# Patient Record
Sex: MALE | Race: WHITE | NOT HISPANIC OR LATINO | Employment: PART TIME | URBAN - METROPOLITAN AREA
[De-identification: names, ages, dates, MRNs, and addresses within clinical notes are randomized per-mention and may not be internally consistent; named-entity substitution may affect disease eponyms.]

---

## 2017-01-06 ENCOUNTER — OFFICE VISIT (OUTPATIENT)
Dept: FAMILY MEDICINE | Facility: CLINIC | Age: 57
End: 2017-01-06
Payer: COMMERCIAL

## 2017-01-06 VITALS
WEIGHT: 250.9 LBS | HEIGHT: 71 IN | DIASTOLIC BLOOD PRESSURE: 78 MMHG | TEMPERATURE: 98 F | SYSTOLIC BLOOD PRESSURE: 138 MMHG | BODY MASS INDEX: 35.13 KG/M2 | HEART RATE: 64 BPM | RESPIRATION RATE: 14 BRPM

## 2017-01-06 DIAGNOSIS — M75.82 ROTATOR CUFF TENDONITIS, LEFT: Primary | ICD-10-CM

## 2017-01-06 DIAGNOSIS — S46.812A TRAPEZIUS MUSCLE STRAIN, LEFT, INITIAL ENCOUNTER: ICD-10-CM

## 2017-01-06 DIAGNOSIS — H60.92 OTITIS EXTERNA OF LEFT EAR, UNSPECIFIED CHRONICITY, UNSPECIFIED TYPE: ICD-10-CM

## 2017-01-06 PROCEDURE — 99214 OFFICE O/P EST MOD 30 MIN: CPT | Performed by: FAMILY MEDICINE

## 2017-01-06 RX ORDER — NEOMYCIN SULFATE, POLYMYXIN B SULFATE AND HYDROCORTISONE 10; 3.5; 1 MG/ML; MG/ML; [USP'U]/ML
4 SUSPENSION/ DROPS AURICULAR (OTIC) 4 TIMES DAILY
Qty: 4 ML | Refills: 0 | Status: SHIPPED | OUTPATIENT
Start: 2017-01-06 | End: 2017-01-11

## 2017-01-06 NOTE — PROGRESS NOTES
"  SUBJECTIVE:                                                    Crispin Rojas is a 56 year old male who presents to clinic today with multiple concerns:     1.   Joint Pain     Onset: 2-3 years     Description:   Location: left shoulder  Character: Sharp    Intensity: moderate, severe    Progression of Symptoms: worsening     Accompanying Signs & Symptoms:  Other symptoms: none   History:   Previous similar pain: not before the past 2-3 years      Precipitating factors:   Trauma or overuse: no     Alleviating factors:  Improved by: deep heating rub, tiger balm with some relief.        Therapies Tried and outcome: tiger balm, sports cream, helps a little  At times when pain is worse has a \" hard time going straight\" over my head or going behind like putting on seat belt\".  Pain worse at the end of day - works with hands at lot and also an avid golfer.   Cannot seem to get in a comfortable sleeping position at night. When can't sleep uses tiger balm which helps him sleep but doesn't make it go away.  Pain today is 1/10.     2.   Neck Pain     Onset: 9-10 months     Description:   Location: left side of neck   Radiation: none    Intensity: moderate    Progression of Symptoms:  waxing and waning    Accompanying Signs & Symptoms:  Burning, prickly sensation (paresthesias) in arm(s): no   Numbness in arm(s): no   Weakness in arm(s):  YES  Fever: no   Headache: no   Nausea and/or vomiting: no    History:   Trauma: no   Previous neck pain: no   Previous surgery or injections: no   Previous Imaging (MRI,X ray): no     Precipitating factors:   Does movement increase the pain:  No, movement can sometimes help pain     Alleviating factors:       Therapies Tried and outcome:  None    Feels its more muscular. Turning head seems to aggravate it.       3. Ear pain -  Has been on/off since this summer. States he mentioned it at his last physical and was told it could be referred pain from ear.   States when pain starts it " "usually aches for the remainder of the day. Denies any drainage.     Problem list and histories reviewed & adjusted, as indicated.  Additional history: as documented    Problem list, Medication list, Allergies, and Medical/Social/Surgical histories reviewed in [facility] and updated as appropriate.    ROS:  Constitutional, HEENT, msk, neuro systems are negative, except as otherwise noted.    OBJECTIVE:                                                    /78 mmHg  Pulse 64  Temp(Src) 98  F (36.7  C) (Oral)  Resp 14  Ht 5' 11\" (1.803 m)  Wt 250 lb 14.4 oz (113.807 kg)  BMI 35.01 kg/m2  Body mass index is 35.01 kg/(m^2).  GENERAL: healthy, alert and no distress  HENT: normal cephalic/atraumatic, right ear: normal: no effusions, no erythema, normal landmarks, left ear: red and boggy canal and  Hazy appearing TM, cerumen present, nose and mouth without ulcers or lesions, oropharynx clear and oral mucous membranes moist  RESP: lungs clear to auscultation - no rales, rhonchi or wheezes  CV: regular rate and rhythm, normal S1 S2, no S3 or S4, no murmur, click or rub, no peripheral edema and peripheral pulses strong  MS: left shoulder- + TTP trapezius muscle,     Diagnostic Test Results:  none      ASSESSMENT/PLAN:                                                        1. Rotator cuff tendonitis, left  - sxs consistent with rotator cuff tendinopathy. Recommend physical therapy at this time. Also discussed option of corticosteroid injection but will hold off on this for now.   If no improvement will refer to ortho for further evaluation.   - [facility]    2. Trapezius muscle strain, left, initial encounter    - [facility]    3. Otitis externa of left ear, unspecified chronicity, unspecified type  - will start on cortisporin   - neomycin-polymyxin-hydrocortisone (CORTISPORIN) 3.5-65997-9 otic suspension; Place 4 drops Into the left ear 4 times daily for 5 days  " Dispense: 4 mL; Refill: 0    See Patient Instructions    Gely Madera MD  [facility]

## 2017-01-06 NOTE — MR AVS SNAPSHOT
After Visit Summary   1/6/2017    Crispin Rojas    MRN: [id number]           Patient Information     Date Of Birth          1960        Visit Information        Provider Department      1/6/2017 8:00 AM Gely Madera MD [facility]        Today's Diagnoses     Rotator cuff tendonitis, left    -  1     Trapezius muscle strain, left, initial encounter         Otitis externa of left ear, unspecified chronicity, unspecified type           Care Instructions    Recommend Tylenol extra strength for pain  See handout provided  Follow up with physical therapy     External Ear Infection (Adult)    External otitis (also called  swimmer s ear ) is an infection in the ear canal. It is often caused by bacteria or fungus. It can occur a few days after water gets trapped in the ear canal (from swimming or bathing). It can also occur after cleaning too deeply in the ear canal with a cotton swab or other object. Sometimes, hair care products get into the ear canal and cause this problem.  Symptoms can include pain, fever, itching, redness, drainage, or swelling of the ear canal. Temporary hearing loss may also occur.  Home care    Do not try to clean the ear canal. This can push pus and bacteria deeper into the canal.    Use prescribed ear drops as directed. These help reduce swelling and fight the infection. If an ear wick was placed in the ear canal, apply drops right onto the end of the wick. The wick will draw the medication into the ear canal even if it is swollen closed.    A cotton ball may be loosely placed in the outer ear to absorb any drainage.    You may use acetaminophen or ibuprofen to control pain, unless another medication was prescribed. Note: If you have chronic liver or kidney disease or ever had a stomach ulcer or GI bleeding, talk to your health care provider before taking any of these medications.    Do not allow water to get into your ear when bathing.  Also, avoid swimming until the infection has cleared.  Prevention    Keep your ears dry. This helps lower the risk of infection. Dry your ears with a towel or hair dryer after getting wet. Also, use ear plugs when swimming.    Do not stick any objects in the ear to remove wax.    If you feel water trapped in your ear, use ear drops right away. You can get these drops over the counter at most drugstores.  They work by removing water from the ear canal.  Follow-up care  Follow up with your health care provider in one week, or as advised.   When to seek medical advice  Call your health care provider right away if any of these occur:    Ear pain becomes worse or doesn t improve after 3 days of treatment    Redness or swelling of the outer ear occurs or gets worse    Headache    Painful or stiff neck    Drowsiness or confusion    Fever of 100.4 F (38 C) or higher, or as directed by your health care provider    Seizure    8817-1504 [employer]. [address] [place] [address]. All rights reserved. This information is not intended as a substitute for professional medical care. Always follow your healthcare professional's instructions.              Follow-ups after your visit        Additional Services     [facility]       **This order will print in the [facility] Scheduling Office**    Physical Therapy, Hand Therapy and Chiropractic Care are available through:    *[facility]  *[facility]  *[facility]    Call one number to schedule at any of the above locations: [phone].    Your provider has referred you to: Physical Therapy at [facility] or [facility]    Indication/Reason for Referral: Shoulder Pain and neck pain   Onset of Illness: months   Therapy Orders: Evaluate and Treat  Special Programs: None  Special Request: None    Nicole Rivera      Additional Comments for the Therapist or Chiropractor:     Please be aware that  "coverage of these services is subject to the terms and limitations of your health insurance plan.  Call member services at your health plan with any benefit or coverage questions.      Please bring the following to your appointment:    *Your personal calendar for scheduling future appointments  *Comfortable clothing                  Who to contact     If you have questions or need follow up information about today's clinic visit or your schedule please contact [facility] directly at [phone].  Normal or non-critical lab and imaging results will be communicated to you by [employer], letter or phone within 4 business days after the clinic has received the results. If you do not hear from us within 7 days, please contact the clinic through [employer] or phone. If you have a critical or abnormal lab result, we will notify you by phone as soon as possible.  Submit refill requests through [employer] or call your pharmacy and they will forward the refill request to us. Please allow 3 business days for your refill to be completed.          Additional Information About Your Visit        [employer]inder[employer]cai Information     [employer] gives you secure access to your electronic health record. If you see a primary care provider, you can also send messages to your care team and make appointments. If you have questions, please call your primary care clinic.  If you do not have a primary care provider, please call [phone] and they will assist you.        Care EveryWhere ID     This is your Care EveryWhere ID. This could be used by other organizations to access your [facility] medical records  [id number]        Your Vitals Were     Pulse Temperature Respirations Height BMI (Body Mass Index)       64 98  F (36.7  C) (Oral) 14 5' 11\" (1.803 m) 35.01 kg/m2        Blood Pressure from Last 3 Encounters:   01/06/17 138/78   12/02/16 124/78   10/14/16 132/62    Weight from Last 3 Encounters:   01/06/17 250 lb 14.4 oz (113.807 kg) "   12/02/16 239 lb (108.41 kg)   10/14/16 219 lb (99.338 kg)              We Performed the Following     [facility]          Today's Medication Changes          These changes are accurate as of: 1/6/17  9:29 AM.  If you have any questions, ask your nurse or doctor.               Start taking these medicines.        Dose/Directions    neomycin-polymyxin-hydrocortisone 3.5-18123-5 otic suspension   Commonly known as:  CORTISPORIN   Used for:  Otitis externa of left ear, unspecified chronicity, unspecified type   Started by:  Gely Madera MD        Dose:  4 drop   Place 4 drops Into the left ear 4 times daily for 5 days   Quantity:  4 mL   Refills:  0            Where to get your medicines      These medications were sent to [id number]  [place], [place] - [address] AT [place] [address]  [address], [place] [address]     Phone:  [phone]    - neomycin-polymyxin-hydrocortisone 3.5-00609-9 otic suspension             Primary Care Provider Office Phone # Fax #    Aden Ethan Lopez [phone]       [address] [address]        Thank you!     Thank you for choosing [facility]  for your care. Our goal is always to provide you with excellent care. Hearing back from our patients is one way we can continue to improve our services. Please take a few minutes to complete the written survey that you may receive in the mail after your visit with us. Thank you!             Your Updated Medication List - Protect others around you: Learn how to safely use, store and throw away your medicines at www.disposemymeds.org.          This list is accurate as of: 1/6/17  9:29 AM.  Always use your most recent med list.                   Brand Name Dispense Instructions for use    amLODIPine 5 MG tablet    NORVASC    90 tablet    Take 1 tablet (5 mg) by mouth daily        "atorvastatin 40 MG tablet    LIPITOR    30 tablet    Take 1 tablet (40 mg) by mouth daily       BD insulin syringe ultrafine 30G X 1/2\" 0.3 ML   Generic drug:  insulin syringe-needle U-100     100 each    1 Syringe daily. Use one syringe  daily or as directed.       blood glucose monitoring test strip    ACCU-CHEK SMARTVIEW    1 Box    by In Vitro route 3 times daily DISPENSE PER PT INSURANCE PER PT PREFERENCE       Co-Enzyme Q10 100 MG Caps      Take 100 mg by mouth daily       insulin aspart 100 UNIT/ML injection    NovoLOG FLEXPEN    3 Month    For mealtime carbohydrate and hgm coverage 10-25 units three times daily       insulin glargine 100 UNIT/ML injection    LANTUS SOLOSTAR    3 Month    Take 75 units daily       ketoconazole 2 % shampoo    NIZORAL    120 mL    Apply to the affected area and wash off after 5 minutes.       lisinopril 10 MG tablet    PRINIVIL/ZESTRIL    90 tablet    Take 1 tablet (10 mg) by mouth daily       metFORMIN 500 MG 24 hr tablet    GLUCOPHAGE-XR    360 tablet    Take 2 tablets (1,000 mg) by mouth 2 times daily (with meals)       MULTIVITAMIN PO      Take 1 tablet by mouth daily       neomycin-polymyxin-hydrocortisone 3.5-92029-7 otic suspension    CORTISPORIN    4 mL    Place 4 drops Into the left ear 4 times daily for 5 days       OMEGA-3 FISH OIL PO      Take 2 g by mouth daily       thin lancets    NO BRAND SPECIFIED    300 each    1 each 3 times daily.       VITAMIN B 12 PO      Take 500 mcg by mouth daily       VITAMIN D3 PO      Take 1,000 Units by mouth daily         "

## 2017-01-06 NOTE — NURSING NOTE
"Chief Complaint   Patient presents with     Ear Problem     left ear pain-- off and on since this summer, pt has been seen with PCP for this     Shoulder Pain     Left shoulder, no c/o accident or injury- off and on for 2-3 years     Neck Pain     left side neck- off and on for 9-10 months     Initial /78 mmHg  Pulse 64  Temp(Src) 98  F (36.7  C) (Oral)  Resp 14  Ht 5' 11\" (1.803 m)  Wt 250 lb 14.4 oz (113.807 kg)  BMI 35.01 kg/m2 Estimated body mass index is 35.01 kg/(m^2) as calculated from the following:    Height as of this encounter: 5' 11\" (1.803 m).    Weight as of this encounter: 250 lb 14.4 oz (113.807 kg).  BP completed using cuff size large Right Arm    Health Maintenance reviewed - Yes: (pt will have foot exam at next DM visit)  Tobacco Verified: Yes  Family History Updated: Yes  MyChart Offered: Yes  Immunizations Up to Date:  Yes    Haydee Kitchen CMA      "

## 2017-01-06 NOTE — PATIENT INSTRUCTIONS
Recommend Tylenol extra strength for pain  See handout provided  Follow up with physical therapy     External Ear Infection (Adult)    External otitis (also called  swimmer s ear ) is an infection in the ear canal. It is often caused by bacteria or fungus. It can occur a few days after water gets trapped in the ear canal (from swimming or bathing). It can also occur after cleaning too deeply in the ear canal with a cotton swab or other object. Sometimes, hair care products get into the ear canal and cause this problem.  Symptoms can include pain, fever, itching, redness, drainage, or swelling of the ear canal. Temporary hearing loss may also occur.  Home care    Do not try to clean the ear canal. This can push pus and bacteria deeper into the canal.    Use prescribed ear drops as directed. These help reduce swelling and fight the infection. If an ear wick was placed in the ear canal, apply drops right onto the end of the wick. The wick will draw the medication into the ear canal even if it is swollen closed.    A cotton ball may be loosely placed in the outer ear to absorb any drainage.    You may use acetaminophen or ibuprofen to control pain, unless another medication was prescribed. Note: If you have chronic liver or kidney disease or ever had a stomach ulcer or GI bleeding, talk to your health care provider before taking any of these medications.    Do not allow water to get into your ear when bathing. Also, avoid swimming until the infection has cleared.  Prevention    Keep your ears dry. This helps lower the risk of infection. Dry your ears with a towel or hair dryer after getting wet. Also, use ear plugs when swimming.    Do not stick any objects in the ear to remove wax.    If you feel water trapped in your ear, use ear drops right away. You can get these drops over the counter at most drugstores.  They work by removing water from the ear canal.  Follow-up care  Follow up with your health care provider in  one week, or as advised.   When to seek medical advice  Call your health care provider right away if any of these occur:    Ear pain becomes worse or doesn t improve after 3 days of treatment    Redness or swelling of the outer ear occurs or gets worse    Headache    Painful or stiff neck    Drowsiness or confusion    Fever of 100.4 F (38 C) or higher, or as directed by your health care provider    Seizure    3962-3737 [employer]. [address] [place] [address], [place] [address]. All rights reserved. This information is not intended as a substitute for professional medical care. Always follow your healthcare professional's instructions.

## 2017-01-23 DIAGNOSIS — E11.49 DIABETES MELLITUS TYPE 2 WITH NEUROLOGICAL MANIFESTATIONS (H): Primary | ICD-10-CM

## 2017-01-23 NOTE — TELEPHONE ENCOUNTER
blood glucose monitoring (ACCU-CHEK SMARTVIEW) test strip      Last Written Prescription Date: 06/02/16  Last Fill Quantity: 1,  # refills: 12   Last Office Visit with [facility], [facility] or [facility] prescribing provider: 12/02/16

## 2017-01-24 NOTE — TELEPHONE ENCOUNTER
A1C      7.3   12/5/2016  A1C      6.7   8/30/2016  Recent Labs   Lab Test  12/05/16   1500  08/30/16   1419   07/31/15   1649   CHOL  131  92   < >  141   HDL  45  33*   < >  34*   LDL  69  42   < >  87   TRIG  84  85   < >  101   CHOLHDLRATIO   --    --    --   4.1    < > = values in this interval not displayed.     BP Readings from Last 2 Encounters:   01/06/17 138/78   12/02/16 124/78     Prescription approved per [facility] Refill Protocol.  June Thomas, RN, BSN

## 2017-01-25 ENCOUNTER — TELEPHONE (OUTPATIENT)
Dept: NURSING | Facility: CLINIC | Age: 57
End: 2017-01-25

## 2017-01-25 DIAGNOSIS — E11.49 DIABETES MELLITUS TYPE 2 WITH NEUROLOGICAL MANIFESTATIONS (H): Primary | ICD-10-CM

## 2017-01-25 NOTE — TELEPHONE ENCOUNTER
Pt calls, needs new glucometer and test strips sent to [facility]'s per pt insurance  Prescription approved per [facility] Refill Protocol.  June Thomas RN, BSN

## 2017-01-26 ENCOUNTER — THERAPY VISIT (OUTPATIENT)
Dept: PHYSICAL THERAPY | Facility: CLINIC | Age: 57
End: 2017-01-26
Payer: COMMERCIAL

## 2017-01-26 DIAGNOSIS — G89.29 CHRONIC LEFT SHOULDER PAIN: Primary | ICD-10-CM

## 2017-01-26 DIAGNOSIS — M25.512 CHRONIC LEFT SHOULDER PAIN: Primary | ICD-10-CM

## 2017-01-26 PROCEDURE — 97161 PT EVAL LOW COMPLEX 20 MIN: CPT | Mod: GP | Performed by: PHYSICAL THERAPIST

## 2017-01-26 PROCEDURE — 97110 THERAPEUTIC EXERCISES: CPT | Mod: GP | Performed by: PHYSICAL THERAPIST

## 2017-01-26 NOTE — PROGRESS NOTES
Subjective:    Crispin Rojas is a 56 year old male with a left shoulder condition.  Condition occurred with:  Unknown cause (L shoulder pain for about 18 months, with slowly getting worse. Pt also c/o neck pain might be swimmers ear (taking ear drops and some improvement) will assess further.).  Condition occurred: for unknown reasons.  This is a chronic condition  Jan 2017 (MD hein)    .    Patient reports pain:  Anterior, lateral and medial.    Pain is described as aching and is intermittent and reported as 10/10 and 5/10.  Associated symptoms:  Loss of motion/stiffness and loss of strength. Pain is worse during the day.  Symptoms are exacerbated by using arm overhead, lying on extremity, using arm behind back, lifting and certain positions and relieved by rest.  Since onset symptoms are gradually worsening.        General health as reported by patient is good.  Pertinent medical history includes:  Diabetes.  Medical allergies: yes.  Surgical history: see EPIC.  Medication history: See Epic.  Current occupation is Working at [employer], [employer], owns   .        Barriers include:  None as reported by the patient.    Red flags:  None as reported by the patient.                      Objective:    Standing Alignment:    Cervical/Thoracic:  Thoracic kyphosis increased  Shoulder/UE:  Rounded shoulders                                       Shoulder Evaluation:  ROM:  AROM:    Flexion:  Left:  120        Abduction:  Left: 110                         PROM:    Flexion:  Left:  110          Abduction:  Left:  100        Internal Rotation:  Left:  80      External Rotation:  Left:  25                        Strength:    Flexion: Left:5-/5   Pain:    Right: 5/5     Pain:     Abduction:  Left: 4+/5   Pain:+    Right: 5/5     Pain:    Internal Rotation:  Left:4+/5      Pain:+    Right: 5/5     Pain:  External Rotation:   Left:5-/5     Pain:   Right:5/5     Pain:                                                        General     ROS    Assessment/Plan:      Patient is a 56 year old male with cervical and left side shoulder complaints.    Patient has the following significant findings with corresponding treatment plan.                Diagnosis 1:  L shoulder pain, OA vs Adhesive Capsulitis  Pain -  hot/cold therapy, self management, education and home program  Decreased ROM/flexibility - manual therapy and therapeutic exercise  Decreased strength - therapeutic exercise and therapeutic activities  Decreased function - therapeutic activities  Impaired posture - neuro re-education    Therapy Evaluation Codes:   1) History comprised of:   Personal factors that impact the plan of care:      Time since onset of symptoms.    Comorbidity factors that impact the plan of care are:      Diabetes.     Medications impacting care: None.  2) Examination of Body Systems comprised of:   Body structures and functions that impact the plan of care:      Cervical spine and Shoulder.   Activity limitations that impact the plan of care are:      Bathing, Dressing, Lifting and Laying down.  3) Clinical presentation characteristics are:   Stable/Uncomplicated.  4) Decision-Making    Low complexity using standardized patient assessment instrument and/or measureable assessment of functional outcome.  Cumulative Therapy Evaluation is: Low complexity.    Previous and current functional limitations:  (See Goal Flow Sheet for this information)    Short term and Long term goals: (See Goal Flow Sheet for this information)     Communication ability:  Patient appears to be able to clearly communicate and understand verbal and written communication and follow directions correctly.  Treatment Explanation - The following has been discussed with the patient:   RX ordered/plan of care  Anticipated outcomes  Possible risks and side effects  This patient would benefit from PT intervention to resume normal activities.   Rehab potential is good.    Frequency:  1 X  week, once daily  Duration:  for 6-8 weeks  Discharge Plan:  Achieve all LTG.  Independent in home treatment program.  Reach maximal therapeutic benefit.    Please refer to the daily flowsheet for treatment today, total treatment time and time spent performing 1:1 timed codes.

## 2017-01-30 ENCOUNTER — THERAPY VISIT (OUTPATIENT)
Dept: PHYSICAL THERAPY | Facility: CLINIC | Age: 57
End: 2017-01-30
Payer: COMMERCIAL

## 2017-01-30 DIAGNOSIS — M54.2 CERVICALGIA: ICD-10-CM

## 2017-01-30 DIAGNOSIS — M25.512 CHRONIC LEFT SHOULDER PAIN: Primary | ICD-10-CM

## 2017-01-30 DIAGNOSIS — G89.29 CHRONIC LEFT SHOULDER PAIN: Primary | ICD-10-CM

## 2017-01-30 PROCEDURE — 97110 THERAPEUTIC EXERCISES: CPT | Mod: GP | Performed by: PHYSICAL THERAPIST

## 2017-01-30 PROCEDURE — 97140 MANUAL THERAPY 1/> REGIONS: CPT | Mod: GP | Performed by: PHYSICAL THERAPIST

## 2017-02-02 ENCOUNTER — THERAPY VISIT (OUTPATIENT)
Dept: PHYSICAL THERAPY | Facility: CLINIC | Age: 57
End: 2017-02-02
Payer: COMMERCIAL

## 2017-02-02 DIAGNOSIS — M54.2 CERVICALGIA: ICD-10-CM

## 2017-02-02 DIAGNOSIS — M25.512 CHRONIC LEFT SHOULDER PAIN: Primary | ICD-10-CM

## 2017-02-02 DIAGNOSIS — G89.29 CHRONIC LEFT SHOULDER PAIN: Primary | ICD-10-CM

## 2017-02-02 PROCEDURE — 97110 THERAPEUTIC EXERCISES: CPT | Mod: GP | Performed by: PHYSICAL THERAPIST

## 2017-02-02 PROCEDURE — 97140 MANUAL THERAPY 1/> REGIONS: CPT | Mod: GP | Performed by: PHYSICAL THERAPIST

## 2017-02-09 ENCOUNTER — THERAPY VISIT (OUTPATIENT)
Dept: PHYSICAL THERAPY | Facility: CLINIC | Age: 57
End: 2017-02-09
Payer: COMMERCIAL

## 2017-02-09 DIAGNOSIS — M25.512 CHRONIC LEFT SHOULDER PAIN: Primary | ICD-10-CM

## 2017-02-09 DIAGNOSIS — M54.2 CERVICALGIA: ICD-10-CM

## 2017-02-09 DIAGNOSIS — G89.29 CHRONIC LEFT SHOULDER PAIN: Primary | ICD-10-CM

## 2017-02-09 PROCEDURE — 97110 THERAPEUTIC EXERCISES: CPT | Mod: GP | Performed by: PHYSICAL THERAPIST

## 2017-02-09 PROCEDURE — 97112 NEUROMUSCULAR REEDUCATION: CPT | Mod: GP | Performed by: PHYSICAL THERAPIST

## 2017-02-16 ENCOUNTER — THERAPY VISIT (OUTPATIENT)
Dept: PHYSICAL THERAPY | Facility: CLINIC | Age: 57
End: 2017-02-16
Payer: COMMERCIAL

## 2017-02-16 DIAGNOSIS — M54.2 CERVICALGIA: ICD-10-CM

## 2017-02-16 DIAGNOSIS — M25.512 CHRONIC LEFT SHOULDER PAIN: ICD-10-CM

## 2017-02-16 DIAGNOSIS — G89.29 CHRONIC LEFT SHOULDER PAIN: ICD-10-CM

## 2017-02-16 PROCEDURE — 97110 THERAPEUTIC EXERCISES: CPT | Mod: GP | Performed by: PHYSICAL THERAPIST

## 2017-02-16 PROCEDURE — 97140 MANUAL THERAPY 1/> REGIONS: CPT | Mod: GP | Performed by: PHYSICAL THERAPIST

## 2017-02-23 ENCOUNTER — OFFICE VISIT (OUTPATIENT)
Dept: FAMILY MEDICINE | Facility: CLINIC | Age: 57
End: 2017-02-23
Payer: COMMERCIAL

## 2017-02-23 ENCOUNTER — TRANSFERRED RECORDS (OUTPATIENT)
Dept: HEALTH INFORMATION MANAGEMENT | Facility: CLINIC | Age: 57
End: 2017-02-23

## 2017-02-23 VITALS
DIASTOLIC BLOOD PRESSURE: 74 MMHG | SYSTOLIC BLOOD PRESSURE: 144 MMHG | TEMPERATURE: 97.9 F | HEART RATE: 56 BPM | RESPIRATION RATE: 16 BRPM | WEIGHT: 257 LBS | BODY MASS INDEX: 35.84 KG/M2

## 2017-02-23 DIAGNOSIS — H92.02 OTALGIA OF LEFT EAR: Primary | ICD-10-CM

## 2017-02-23 PROCEDURE — 99213 OFFICE O/P EST LOW 20 MIN: CPT | Performed by: FAMILY MEDICINE

## 2017-02-23 NOTE — MR AVS SNAPSHOT
After Visit Summary   2/23/2017    Crispin oRjas    MRN: [id number]           Patient Information     Date Of Birth          1960        Visit Information        Provider Department      2/23/2017 7:00 AM Gely Madera MD [facility]        Today's Diagnoses     Otalgia of left ear    -  1      Care Instructions    Follow up with ENT for further evaluation   Call back for ear drops if cannot be seen by ENT for a while         Follow-ups after your visit        Additional Services     OTOLARYNGOLOGY REFERRAL       Your provider has referred you to: [facility]: [facility] [phone]   [email or web address]    Please be aware that coverage of these services is subject to the terms and limitations of your health insurance plan.  Call member services at your health plan with any benefit or coverage questions.      Please bring the following with you to your appointment:    (1) Any X-Rays, CTs or MRIs which have been performed.  Contact the facility where they were done to arrange for  prior to your scheduled appointment.   (2) List of current medications  (3) This referral request   (4) Any documents/labs given to you for this referral                  Your next 10 appointments already scheduled     Feb 27, 2017 11:10 AM PENNY Florez with Nahum Dewey PT   [facility] ([facility]    [address] [facility] [id number] [phone]              Who to contact     If you have questions or need follow up information about today's clinic visit or your schedule please contact [facility] directly at [phone].  Normal or non-critical lab and imaging results will be communicated to you by MyChart, letter or phone within 4 business days after the clinic has received the results. If you do not hear from us within 7 days,  please contact the clinic through Defywire or phone. If you have a critical or abnormal lab result, we will notify you by phone as soon as possible.  Submit refill requests through Defywire or call your pharmacy and they will forward the refill request to us. Please allow 3 business days for your refill to be completed.          Additional Information About Your Visit        WaveseerharIntraxio Information     Defywire gives you secure access to your electronic health record. If you see a primary care provider, you can also send messages to your care team and make appointments. If you have questions, please call your primary care clinic.  If you do not have a primary care provider, please call [phone] and they will assist you.        Care EveryWhere ID     This is your Care EveryWhere ID. This could be used by other organizations to access your [place] medical records  [id number]        Your Vitals Were     Pulse Temperature Respirations BMI (Body Mass Index)          56 97.9  F (36.6  C) (Oral) 16 35.84 kg/m2         Blood Pressure from Last 3 Encounters:   02/23/17 144/74   01/06/17 138/78   12/02/16 124/78    Weight from Last 3 Encounters:   02/23/17 257 lb (116.6 kg)   01/06/17 250 lb 14.4 oz (113.8 kg)   12/02/16 239 lb (108.4 kg)              We Performed the Following     OTOLARYNGOLOGY REFERRAL        Primary Care Provider Office Phone # Fax #    Aden Lopez [phone]       [address] [address]        Thank you!     Thank you for choosing [facility]  for your care. Our goal is always to provide you with excellent care. Hearing back from our patients is one way we can continue to improve our services. Please take a few minutes to complete the written survey that you may receive in the mail after your visit with us. Thank you!             Your Updated Medication List - Protect others around you: Learn how to safely use,  "store and throw away your medicines at www.disposemymeds.org.          This list is accurate as of: 2/23/17  7:21 AM.  Always use your most recent med list.                   Brand Name Dispense Instructions for use    amLODIPine 5 MG tablet    NORVASC    90 tablet    Take 1 tablet (5 mg) by mouth daily       atorvastatin 40 MG tablet    LIPITOR    30 tablet    Take 1 tablet (40 mg) by mouth daily       BD insulin syringe ultrafine 30G X 1/2\" 0.3 ML   Generic drug:  insulin syringe-needle U-100     100 each    1 Syringe daily. Use one syringe  daily or as directed.       blood glucose monitoring meter device kit    no brand specified    1 kit    Use to test blood sugar 3 times daily or as directed.       blood glucose monitoring test strip    no brand specified    300 strip    Use to test blood sugars 3 times daily or as directed       Co-Enzyme Q10 100 MG Caps      Take 100 mg by mouth daily       insulin aspart 100 UNIT/ML injection    NovoLOG FLEXPEN    3 Month    For mealtime carbohydrate and hgm coverage 10-25 units three times daily       insulin glargine 100 UNIT/ML injection    LANTUS SOLOSTAR    3 Month    Take 75 units daily       ketoconazole 2 % shampoo    NIZORAL    120 mL    Apply to the affected area and wash off after 5 minutes.       lisinopril 10 MG tablet    PRINIVIL/ZESTRIL    90 tablet    Take 1 tablet (10 mg) by mouth daily       metFORMIN 500 MG 24 hr tablet    GLUCOPHAGE-XR    360 tablet    Take 2 tablets (1,000 mg) by mouth 2 times daily (with meals)       MULTIVITAMIN PO      Take 1 tablet by mouth daily       OMEGA-3 FISH OIL PO      Take 2 g by mouth daily       thin lancets    NO BRAND SPECIFIED    300 each    1 each 3 times daily.       VITAMIN B 12 PO      Take 500 mcg by mouth daily       VITAMIN D3 PO      Take 1,000 Units by mouth daily         "

## 2017-02-23 NOTE — PATIENT INSTRUCTIONS
Follow up with ENT for further evaluation   Call back for ear drops if cannot be seen by ENT for a while

## 2017-02-23 NOTE — PROGRESS NOTES
SUBJECTIVE:                                                    Crispin Rojas is a 56 year old male who presents to clinic today for the following health issues:      F/u left ear pain  At last visit, there was some evidence of otitis externa which was treated with drops. He reports with the drops his pain seemed to pain.   Has been out of drops for several weeks and pain has returned. Denies any drainage from ear.       Problem list and histories reviewed & adjusted, as indicated.  Additional history: as documented    Problem list, Medication list, Allergies, and Medical/Social/Surgical histories reviewed in [facility] and updated as appropriate.    ROS:  Constitutional, HEENT  systems are negative, except as otherwise noted.    OBJECTIVE:                                                    /74 (BP Location: Left arm, Patient Position: Chair, Cuff Size: Adult Large)  Pulse 56  Temp 97.9  F (36.6  C) (Oral)  Resp 16  Wt 257 lb (116.6 kg)  BMI 35.84 kg/m2  Body mass index is 35.84 kg/(m^2).  GENERAL: healthy, alert and no distress  HENT: normal cephalic/atraumatic, right ear: normal: no effusions, no erythema, normal landmarks, left ear: mild erythema otherwise negative, nose and mouth without ulcers or lesions, oropharynx clear and oral mucous membranes moist    Diagnostic Test Results:  none      ASSESSMENT/PLAN:                                                        1. Otalgia of left ear  - due to chronicity of this pain will refer to ENT for further evaluation   - OTOLARYNGOLOGY REFERRAL    See Patient Instructions    Gely Madera MD  [facility]

## 2017-02-23 NOTE — NURSING NOTE
"Chief Complaint   Patient presents with     Ear Problem     f/u left ear pain, was prescribed ear drops and improved but now having intermittent left ear pain     Initial /74 (BP Location: Left arm, Patient Position: Chair, Cuff Size: Adult Large)  Pulse 56  Temp 97.9  F (36.6  C) (Oral)  Resp 16  Wt 257 lb (116.6 kg)  BMI 35.84 kg/m2 Estimated body mass index is 35.84 kg/(m^2) as calculated from the following:    Height as of 1/6/17: 5' 11\" (1.803 m).    Weight as of this encounter: 257 lb (116.6 kg)..  BP completed using cuff size large.            Shania Warren/KOBI    "

## 2017-02-27 ENCOUNTER — THERAPY VISIT (OUTPATIENT)
Dept: PHYSICAL THERAPY | Facility: CLINIC | Age: 57
End: 2017-02-27
Payer: COMMERCIAL

## 2017-02-27 DIAGNOSIS — M54.2 CERVICALGIA: ICD-10-CM

## 2017-02-27 DIAGNOSIS — M25.512 CHRONIC LEFT SHOULDER PAIN: ICD-10-CM

## 2017-02-27 DIAGNOSIS — G89.29 CHRONIC LEFT SHOULDER PAIN: ICD-10-CM

## 2017-02-27 PROCEDURE — 97110 THERAPEUTIC EXERCISES: CPT | Mod: GP | Performed by: PHYSICAL THERAPIST

## 2017-03-13 DIAGNOSIS — E11.52 TYPE 2 DIABETES MELLITUS WITH DIABETIC PERIPHERAL ANGIOPATHY AND GANGRENE, WITH LONG-TERM CURRENT USE OF INSULIN (H): ICD-10-CM

## 2017-03-13 DIAGNOSIS — Z79.4 TYPE 2 DIABETES MELLITUS WITH DIABETIC PERIPHERAL ANGIOPATHY AND GANGRENE, WITH LONG-TERM CURRENT USE OF INSULIN (H): ICD-10-CM

## 2017-03-13 NOTE — TELEPHONE ENCOUNTER
Patient called to check, did not see update from Dr Lopez however Dr Lopez let me know that he did send the script so I called patient to let him know and I verified the pharmacy per Dr Lopez's request.

## 2017-03-13 NOTE — TELEPHONE ENCOUNTER
Pt calls, needs novolog resent for adjusted quantity so insurance can fill 30 day supply, NEEDS DIRECTIONS WRITTEN FOR 10-35 UNITS TID PER INSURANCE, PT NEVER TAKES 35 UNITS BUT THAT is only way can get 30 day supply, routed, if ok approve and close, ROUTE TO INFORM PT WHEN SENT, MAY LM, pt out of town, will make f/u appointment when back    Lab Results   Component Value Date    A1C 7.3 12/05/2016    A1C 6.7 08/30/2016     Recent Labs   Lab Test  12/05/16   1500  08/30/16   1419   07/31/15   1649   CHOL  131  92   < >  141   HDL  45  33*   < >  34*   LDL  69  42   < >  87   TRIG  84  85   < >  101   CHOLHDLRATIO   --    --    --   4.1    < > = values in this interval not displayed.     BP Readings from Last 2 Encounters:   02/23/17 144/74   01/06/17 138/78     June Thomas, RN, BSN  Message handled by Nurse Triage.

## 2017-03-20 ENCOUNTER — THERAPY VISIT (OUTPATIENT)
Dept: PHYSICAL THERAPY | Facility: CLINIC | Age: 57
End: 2017-03-20
Payer: COMMERCIAL

## 2017-03-20 DIAGNOSIS — M25.512 CHRONIC LEFT SHOULDER PAIN: ICD-10-CM

## 2017-03-20 DIAGNOSIS — M54.2 CERVICALGIA: ICD-10-CM

## 2017-03-20 DIAGNOSIS — G89.29 CHRONIC LEFT SHOULDER PAIN: ICD-10-CM

## 2017-03-20 PROCEDURE — 97110 THERAPEUTIC EXERCISES: CPT | Mod: GP | Performed by: PHYSICAL THERAPIST

## 2017-03-30 ENCOUNTER — TRANSFERRED RECORDS (OUTPATIENT)
Dept: HEALTH INFORMATION MANAGEMENT | Facility: CLINIC | Age: 57
End: 2017-03-30

## 2017-03-31 ENCOUNTER — TELEPHONE (OUTPATIENT)
Dept: OTHER | Facility: CLINIC | Age: 57
End: 2017-03-31

## 2017-03-31 ENCOUNTER — THERAPY VISIT (OUTPATIENT)
Dept: PHYSICAL THERAPY | Facility: CLINIC | Age: 57
End: 2017-03-31
Payer: COMMERCIAL

## 2017-03-31 ENCOUNTER — TELEPHONE (OUTPATIENT)
Dept: FAMILY MEDICINE | Facility: CLINIC | Age: 57
End: 2017-03-31

## 2017-03-31 ENCOUNTER — OFFICE VISIT (OUTPATIENT)
Dept: FAMILY MEDICINE | Facility: CLINIC | Age: 57
End: 2017-03-31
Payer: COMMERCIAL

## 2017-03-31 VITALS
RESPIRATION RATE: 14 BRPM | SYSTOLIC BLOOD PRESSURE: 134 MMHG | WEIGHT: 250.3 LBS | HEIGHT: 71 IN | HEART RATE: 60 BPM | TEMPERATURE: 97.6 F | BODY MASS INDEX: 35.04 KG/M2 | DIASTOLIC BLOOD PRESSURE: 68 MMHG

## 2017-03-31 DIAGNOSIS — M25.512 CHRONIC LEFT SHOULDER PAIN: ICD-10-CM

## 2017-03-31 DIAGNOSIS — M54.2 CERVICALGIA: ICD-10-CM

## 2017-03-31 DIAGNOSIS — Z13.89 SCREENING FOR DIABETIC PERIPHERAL NEUROPATHY: Primary | ICD-10-CM

## 2017-03-31 DIAGNOSIS — E11.49 DIABETES MELLITUS TYPE 2 WITH NEUROLOGICAL MANIFESTATIONS (H): ICD-10-CM

## 2017-03-31 DIAGNOSIS — E11.51 TYPE II DIABETES MELLITUS WITH PERIPHERAL CIRCULATORY DISORDER (H): ICD-10-CM

## 2017-03-31 DIAGNOSIS — M65.90 SYNOVITIS AND TENOSYNOVITIS: ICD-10-CM

## 2017-03-31 DIAGNOSIS — G89.29 CHRONIC LEFT SHOULDER PAIN: ICD-10-CM

## 2017-03-31 LAB
CRP SERPL-MCNC: <2.9 MG/L (ref 0–8)
HBA1C MFR BLD: 7.5 % (ref 4.3–6)

## 2017-03-31 PROCEDURE — 83036 HEMOGLOBIN GLYCOSYLATED A1C: CPT | Performed by: FAMILY MEDICINE

## 2017-03-31 PROCEDURE — 86431 RHEUMATOID FACTOR QUANT: CPT | Performed by: FAMILY MEDICINE

## 2017-03-31 PROCEDURE — 80061 LIPID PANEL: CPT | Performed by: FAMILY MEDICINE

## 2017-03-31 PROCEDURE — 99214 OFFICE O/P EST MOD 30 MIN: CPT | Performed by: FAMILY MEDICINE

## 2017-03-31 PROCEDURE — 99207 C FOOT EXAM  NO CHARGE: CPT | Performed by: FAMILY MEDICINE

## 2017-03-31 PROCEDURE — 84550 ASSAY OF BLOOD/URIC ACID: CPT | Performed by: FAMILY MEDICINE

## 2017-03-31 PROCEDURE — 80053 COMPREHEN METABOLIC PANEL: CPT | Performed by: FAMILY MEDICINE

## 2017-03-31 PROCEDURE — 97110 THERAPEUTIC EXERCISES: CPT | Mod: GP | Performed by: PHYSICAL THERAPIST

## 2017-03-31 PROCEDURE — 36415 COLL VENOUS BLD VENIPUNCTURE: CPT | Performed by: FAMILY MEDICINE

## 2017-03-31 PROCEDURE — 86140 C-REACTIVE PROTEIN: CPT | Performed by: FAMILY MEDICINE

## 2017-03-31 PROCEDURE — 86038 ANTINUCLEAR ANTIBODIES: CPT | Performed by: FAMILY MEDICINE

## 2017-03-31 ASSESSMENT — ANXIETY QUESTIONNAIRES
6. BECOMING EASILY ANNOYED OR IRRITABLE: NOT AT ALL
5. BEING SO RESTLESS THAT IT IS HARD TO SIT STILL: NOT AT ALL
2. NOT BEING ABLE TO STOP OR CONTROL WORRYING: NOT AT ALL
7. FEELING AFRAID AS IF SOMETHING AWFUL MIGHT HAPPEN: NOT AT ALL
GAD7 TOTAL SCORE: 0
3. WORRYING TOO MUCH ABOUT DIFFERENT THINGS: NOT AT ALL
1. FEELING NERVOUS, ANXIOUS, OR ON EDGE: NOT AT ALL

## 2017-03-31 ASSESSMENT — PATIENT HEALTH QUESTIONNAIRE - PHQ9: 5. POOR APPETITE OR OVEREATING: NOT AT ALL

## 2017-03-31 NOTE — TELEPHONE ENCOUNTER
Pt referred to [facility] by Aden Lopez MD for Diabetes mellitus type 2 with neurological manifestations & Type II diabetes mellitus with peripheral circulatory disorder    Pt needs to be scheduled for a consult with vascular medicine.  Will route to scheduling to coordinate an appointment next available.    Elzbieta Rodriguez RN BSN

## 2017-03-31 NOTE — MR AVS SNAPSHOT
After Visit Summary   3/31/2017    Crispin Rojas    MRN: [id number]           Patient Information     Date Of Birth          1960        Visit Information        Provider Department      3/31/2017 9:30 AM Aden Lopez MD [facility]        Today's Diagnoses     Screening for diabetic peripheral neuropathy    -  1    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)        Type II diabetes mellitus with peripheral circulatory disorder (H)        Synovitis and tenosynovitis           Follow-ups after your visit        Additional Services     VASCULAR REFERRAL       Your provider has [facility].    Reason for Referral: Cardiovascular Medicine    Urgency of Referral: Next available    Please be aware that coverage of these services is subject to the terms and limitations of your health insurance plan.  Call member services at your health plan with any benefit or coverage questions.      Please bring the following with you to your appointment:  (1) Any X-Rays, CTs or MRIs which have been performed.  Contact the facility where they were done to arrange for  prior to your scheduled appointment.  Any new CT, MRI or other procedures ordered by your specialist must be performed at a [facility] or coordinated by your clinic's referral office.    (2) List of current medications   (3) This referral request   (4) Any documents/labs given to you for this referral                  Your next 10 appointments already scheduled     Apr 10, 2017  2:00 PM CDT   [facility]    [address] [id number] [phone]            Apr 25, 2017  4:20 PM CDT   HUDSON Extremity with Nahum Dewey PT   [facility] ([facility]    [address] [facility]  [place] [address]  "  [phone]            May 16, 2017  8:30 AM CDT   New Visit with Erwin Mccarthy MD   [facility] ([facility]    [address]  [place] [id number] [phone]              Future tests that were ordered for you today     Open Future Orders        Priority Expected Expires Ordered    Hemoglobin A1c - FUTURE S+90 Routine  6/29/2017 3/31/2017            Who to contact     If you have questions or need follow up information about today's clinic visit or your schedule please contact [facility] directly at [phone].  Normal or non-critical lab and imaging results will be communicated to you by Bizzukahart, letter or phone within 4 business days after the clinic has received the results. If you do not hear from us within 7 days, please contact the clinic through Bizzukahart or phone. If you have a critical or abnormal lab result, we will notify you by phone as soon as possible.  Submit refill requests through Truzip or call your pharmacy and they will forward the refill request to us. Please allow 3 business days for your refill to be completed.          Additional Information About Your Visit        BizzukaharFoxtrot Information     Truzip gives you secure access to your electronic health record. If you see a primary care provider, you can also send messages to your care team and make appointments. If you have questions, please call your primary care clinic.  If you do not have a primary care provider, please call [phone] and they will assist you.        Care EveryWhere ID     This is your Care EveryWhere ID. This could be used by other organizations to access your [place] medical records  [id number]        Your Vitals Were     Pulse Temperature Respirations Height BMI (Body Mass Index)       60 97.6  F (36.4  C) (Oral) 14 5' 11\" (1.803 m) 34.91 kg/m2        Blood Pressure from Last 3 Encounters: "   03/31/17 134/68   02/23/17 144/74   01/06/17 138/78    Weight from Last 3 Encounters:   03/31/17 250 lb 4.8 oz (113.5 kg)   02/23/17 257 lb (116.6 kg)   01/06/17 250 lb 14.4 oz (113.8 kg)              We Performed the Following     ANTINUCLEAR ANTIBODY SCREEN BY EIA     Comprehensive metabolic panel     CRP INFLAMMATION     FOOT EXAM  NO CHARGE [95203.114]     HEMOGLOBIN A1C     Lipid panel reflex to direct LDL     RHEUMATOID FACTOR     URIC ACID     VASCULAR REFERRAL          Today's Medication Changes          These changes are accurate as of: 3/31/17 11:59 PM.  If you have any questions, ask your nurse or doctor.               Start taking these medicines.        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 - [place], [place] - [address] AT [facility] [address]  [address], [place] [address]     Phone:  [phone]     insulin glargine 100 UNIT/ML injection                Primary Care Provider Office Phone # Fax #    Aden Lopez [phone]       [facility] [address] [place] [address]        Thank you!     Thank you for choosing [facility]  for your care. Our goal is always to provide you with excellent care. Hearing back from our patients is one way we can continue to improve our services. Please take a few minutes to complete the written survey that you may receive in the mail after your visit with us. Thank you!             Your Updated Medication List - Protect others around you: Learn how to safely use, store and throw away your medicines at www.disposemymeds.org.          This list is accurate  "as of: 3/31/17 11:59 PM.  Always use your most recent med list.                   Brand Name Dispense Instructions for use    amLODIPine 5 MG tablet    NORVASC    90 tablet    Take 1 tablet (5 mg) by mouth daily       atorvastatin 40 MG tablet    LIPITOR    30 tablet    Take 1 tablet (40 mg) by mouth daily       BD insulin syringe ultrafine 30G X 1/2\" 0.3 ML   Generic drug:  insulin syringe-needle U-100     100 each    1 Syringe daily. Use one syringe  daily or as directed.       blood glucose monitoring meter device kit    no brand specified    1 kit    Use to test blood sugar 3 times daily or as directed.       blood glucose monitoring test strip    no brand specified    300 strip    Use to test blood sugars 3 times daily or as directed       CIALIS PO      Take 10 mg by mouth       Co-Enzyme Q10 100 MG Caps      Take 100 mg by mouth daily       insulin aspart 100 UNIT/ML injection    NovoLOG FLEXPEN    30 mL    For mealtime carbohydrate and hgm coverage 10-35 units three times daily       insulin glargine 100 UNIT/ML injection    LANTUS SOLOSTAR    30 mL    Take 75 units daily       ketoconazole 2 % shampoo    NIZORAL    120 mL    Apply to the affected area and wash off after 5 minutes.       lisinopril 10 MG tablet    PRINIVIL/ZESTRIL    90 tablet    Take 1 tablet (10 mg) by mouth daily       metFORMIN 500 MG 24 hr tablet    GLUCOPHAGE-XR    360 tablet    Take 2 tablets (1,000 mg) by mouth 2 times daily (with meals)       MULTIVITAMIN PO      Take 1 tablet by mouth daily       OMEGA-3 FISH OIL PO      Take 2 g by mouth daily       thin lancets    NO BRAND SPECIFIED    300 each    1 each 3 times daily.       VITAMIN B 12 PO      Take 500 mcg by mouth daily       VITAMIN D3 PO      Take 1,000 Units by mouth daily         "

## 2017-03-31 NOTE — NURSING NOTE
"Chief Complaint   Patient presents with     Recheck Medication     A1C lab draw       Initial /68 (BP Location: Right arm, Patient Position: Chair, Cuff Size: Adult Large)  Pulse 60  Temp 97.6  F (36.4  C) (Oral)  Resp 14  Ht 5' 11\" (1.803 m)  Wt 250 lb 4.8 oz (113.5 kg)  BMI 34.91 kg/m2 Estimated body mass index is 34.91 kg/(m^2) as calculated from the following:    Height as of this encounter: 5' 11\" (1.803 m).    Weight as of this encounter: 250 lb 4.8 oz (113.5 kg).  Medication Reconciliation: complete   Claude Cummins CMA       "

## 2017-03-31 NOTE — TELEPHONE ENCOUNTER
Patient calling and states RX only lasts 20 days.  States insurance advised we change RX so he can get more for a month.  Verified what he is taking per day as 75 units.  Advised we can not change how much he takes per day to more than he takes so he can get more per month.  Advised I would send request for 2 boxes and see if it goes through insurance.  Michaela Ahumada RN

## 2017-03-31 NOTE — PROGRESS NOTES
SUBJECTIVE:                                                    Crispin Rojas is a 57 year old male who presents to clinic today for the following health issues:      Medication Followup of Lantus    Taking Medication as prescribed: yes    Side Effects:  None    Medication Helping Symptoms:  yes     SUBJECTIVE:    CC: Crispin Rojas is a 57 year old male who presents for diabetes, vascular disease post partial amputation and concern about autoimmune disease in his family    HPI: four family members with LUPUS, he's had DM not always well controlled and recent amputation issues.    New issue with left ear pain, seeing Dr Ferreira        PROBLEM LIST:                   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     CARDIOVASCULAR SCREENING; LDL GOAL LESS THAN 100     Ulcer of lower limb, right, with necrosis of muscle (H)     Morbid obesity, unspecified obesity type (H)     Other specified transient cerebral ischemias     Osteomyelitis (H)     Calculus of kidney     Chronic left shoulder pain     Cervicalgia       PAST MEDICAL HISTORY:                  Past Medical History:   Diagnosis Date     Chronic infection      H/O heartburn      History of heartburn      Hypertension      Numbness and tingling      Obesity      Type II or unspecified type diabetes mellitus without mention of complication, not stated as uncontrolled        PAST SURGICAL HISTORY:                  Past Surgical History:   Procedure Laterality Date     AMPUTATE FOOT Left 8/18/2016    Procedure: AMPUTATE FOOT;  Surgeon: Jack Younger DPM;  Location: [facility]     AMPUTATE TOE(S) Right 2/1/2016    Procedure: AMPUTATE TOE(S);  Surgeon: Rachelle Manriquez DPM, Pod;  Location: [facility] OR     ANGIOGRAM       COLONOSCOPY       COMBINED CYSTOSCOPY, RETROGRADES, URETEROSCOPY, INSERT STENT Left 8/21/2016    Procedure: COMBINED CYSTOSCOPY, RETROGRADES, URETEROSCOPY, INSERT STENT;  Surgeon: Artemio Valenzuela  MD Claude;  Location: [facility]     COMBINED CYSTOSCOPY, RETROGRADES, URETEROSCOPY, LASER HOLMIUM LITHOTRIPSY URETER(S), INSERT STENT Left 10/3/2016    Procedure: COMBINED CYSTOSCOPY, RETROGRADES, URETEROSCOPY, LASER HOLMIUM LITHOTRIPSY URETER(S), INSERT STENT;  Surgeon: Artemio Valenzuela MD;  Location: [facility]     EXTRACORPOREAL SHOCK WAVE LITHOTRIPSY (ESWL) Left 9/8/2016    Procedure: EXTRACORPOREAL SHOCK WAVE LITHOTRIPSY (ESWL);  Surgeon: Artemio Valenzuela MD;  Location: [facility] OR     RECESSION GASTROCNEMIUS Right 2/1/2016    Procedure: RECESSION GASTROCNEMIUS;  Surgeon: Rachelle Manriquez, DPM, Pod;  Location: [facility] OR       CURRENT MEDICATIONS:                  Current Outpatient Prescriptions   Medication Sig Dispense Refill     Tadalafil (CIALIS PO) Take 10 mg by mouth       insulin glargine (LANTUS SOLOSTAR) 100 UNIT/ML injection Take 75 units daily 15 mL 1     insulin aspart (NOVOLOG FLEXPEN) 100 UNIT/ML injection For mealtime carbohydrate and hgm coverage 10-35 units three times daily 30 mL 1     blood glucose monitoring (NO BRAND SPECIFIED) meter device kit Use to test blood sugar 3 times daily or as directed. 1 kit 0     blood glucose monitoring (NO BRAND SPECIFIED) test strip Use to test blood sugars 3 times daily or as directed 300 strip 3     ketoconazole (NIZORAL) 2 % shampoo Apply to the affected area and wash off after 5 minutes. 120 mL 11     amLODIPine (NORVASC) 5 MG tablet Take 1 tablet (5 mg) by mouth daily 90 tablet 1     lisinopril (PRINIVIL,ZESTRIL) 10 MG tablet Take 1 tablet (10 mg) by mouth daily 90 tablet 3     metFORMIN (GLUCOPHAGE-XR) 500 MG 24 hr tablet Take 2 tablets (1,000 mg) by mouth 2 times daily (with meals) 360 tablet 1     atorvastatin (LIPITOR) 40 MG tablet Take 1 tablet (40 mg) by mouth daily 30 tablet 11     Co-Enzyme Q10 100 MG CAPS Take 100 mg by mouth daily        Cyanocobalamin (VITAMIN B 12 PO) Take 500 mcg by mouth daily        Cholecalciferol (VITAMIN D3 PO) Take 1,000  "Units by mouth daily        Multiple Vitamins-Minerals (MULTIVITAMIN PO) Take 1 tablet by mouth daily        Omega-3 Fatty Acids (OMEGA-3 FISH OIL PO) Take 2 g by mouth daily        lancets thin MISC 1 each 3 times daily. 300 each 3     Insulin Syringe-Needle U-100 (BD INSULIN SYRINGE ULTRAFINE) 30G X 1/2\" 0.3 ML MISC 1 Syringe daily. Use one syringe  daily or as directed. 100 each prn     [DISCONTINUED] insulin glargine (LANTUS SOLOSTAR) 100 UNIT/ML injection Take 75 units daily 3 Month 3             FAMILY HISTORY:                   Family History   Problem Relation Age of Onset     Arthritis Mother      SLE     Connective Tissue Disorder Mother      lupus     DIABETES Mother      DIABETES Father      DIABETES Sister        HEALTH MAINTENANCE:                    REVIEW OF OUTSIDE RECORDS: NO    REVIEW OF SYSTEMS:  C: NEGATIVE for fever, chills  I: NEGATIVE for worrisome rashes, moles or lesions  E: NEGATIVE for vision changes   R: NEGATIVE for significant cough or SOB  CV: NEGATIVE for chest pain, palpitations   GI: NEGATIVE for nausea, abdominal pain, heartburn, or change in bowel habits  : NEGATIVE for frequency, dysuria, or hematuria  M:left knee and right hip synovitis, had partial foot amputation when his dm was uncontrolled   N: NEGATIVE for weakness, dizziness or paresthesias or headache  NVS:no headache or balance issus  INTEG:no moles or new rashes  LYMPH:no nodes or night sweats    EXAM:    /68 (BP Location: Right arm, Patient Position: Chair, Cuff Size: Adult Large)  Pulse 60  Temp 97.6  F (36.4  C) (Oral)  Resp 14  Ht 5' 11\" (1.803 m)  Wt 250 lb 4.8 oz (113.5 kg)  BMI 34.91 kg/m2  GENERAL APPEARANCE: healthy, alert and no distress   EXAM:  GENERAL APPEARANCE: healthy, alert and no distress  EYES: EOMI,  PERRL  HENT: ear canals and TM's normal and nose and mouth without ulcers or lesions  RESP: lungs clear to auscultation - no rales, rhonchi or wheezes  CV: regular rates and rhythm, normal " S1 S2, no S3 or S4 and no murmur, click or rub -  ABDOMEN:  soft, nontender, no HSM or masses and bowel sounds normal  BACK:no tenderness or pain on straight let raise           ASSESSMENT/PLAN  1. Screening for diabetic peripheral neuropathy    2. 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 Type II diabetes mellitus with peripheral circulatory disorder (H)    4. Synovitis and tenosynovitis      Link ear to dm:  See labs  Knee and hip synovitis,     Med review:                             I have discussed with patient the risks, benefits, medications, treatment options and modalities.   I have instructed the patient to call or schedule a follow-up appointment if any problems or failure to improve.                             Problem list and histories reviewed & adjusted, as indicated.  Additional history:         Reviewed and updated as needed this visit by clinical staff       Reviewed and updated as needed this visit by Provider

## 2017-04-01 ENCOUNTER — TELEPHONE (OUTPATIENT)
Dept: FAMILY MEDICINE | Facility: CLINIC | Age: 57
End: 2017-04-01

## 2017-04-01 DIAGNOSIS — E11.49 DIABETES MELLITUS TYPE 2 WITH NEUROLOGICAL MANIFESTATIONS (H): ICD-10-CM

## 2017-04-01 LAB
ALBUMIN SERPL-MCNC: 4 G/DL (ref 3.4–5)
ALP SERPL-CCNC: 53 U/L (ref 40–150)
ALT SERPL W P-5'-P-CCNC: 58 U/L (ref 0–70)
ANION GAP SERPL CALCULATED.3IONS-SCNC: 8 MMOL/L (ref 3–14)
AST SERPL W P-5'-P-CCNC: 40 U/L (ref 0–45)
BILIRUB SERPL-MCNC: 0.4 MG/DL (ref 0.2–1.3)
BUN SERPL-MCNC: 18 MG/DL (ref 7–30)
CALCIUM SERPL-MCNC: 8.7 MG/DL (ref 8.5–10.1)
CHLORIDE SERPL-SCNC: 108 MMOL/L (ref 94–109)
CHOLEST SERPL-MCNC: 109 MG/DL
CO2 SERPL-SCNC: 25 MMOL/L (ref 20–32)
CREAT SERPL-MCNC: 0.89 MG/DL (ref 0.66–1.25)
GFR SERPL CREATININE-BSD FRML MDRD: 88 ML/MIN/1.7M2
GLUCOSE SERPL-MCNC: 166 MG/DL (ref 70–99)
HDLC SERPL-MCNC: 35 MG/DL
LDLC SERPL CALC-MCNC: 53 MG/DL
NONHDLC SERPL-MCNC: 71 MG/DL
POTASSIUM SERPL-SCNC: 4.4 MMOL/L (ref 3.4–5.3)
PROT SERPL-MCNC: 7.6 G/DL (ref 6.8–8.8)
SODIUM SERPL-SCNC: 141 MMOL/L (ref 133–144)
TRIGL SERPL-MCNC: 88 MG/DL
URATE SERPL-MCNC: 6.5 MG/DL (ref 3.5–7.2)

## 2017-04-01 ASSESSMENT — PATIENT HEALTH QUESTIONNAIRE - PHQ9: SUM OF ALL RESPONSES TO PHQ QUESTIONS 1-9: 0

## 2017-04-01 ASSESSMENT — ANXIETY QUESTIONNAIRES: GAD7 TOTAL SCORE: 0

## 2017-04-01 NOTE — TELEPHONE ENCOUNTER
Crispin called. He says that his prescription for lantus was changed to 2 boxes/ month yesterday because his insurance co-pay increases if he gets one box every 20 days. His daily dose is 75 units. He says that now [facility] won't dispense two boxes because the daily amount doesn't match up to 2 boxes.  He can be reached at [phone].    Sarah Mccollum RN     [facility] Nurse Advisor

## 2017-04-03 LAB
ANA SER QL IA: NORMAL
RHEUMATOID FACT SER NEPH-ACNC: <20 IU/ML (ref 0–20)

## 2017-04-04 NOTE — TELEPHONE ENCOUNTER
Fax from [facility], insurance needs new rx for 2 boxes of lantus, need to say up to 100 units daily so 2 boxes with =30 day supply for insurance, resent  June Thomas RN, BSN  Message handled by Nurse Triage.

## 2017-04-06 DIAGNOSIS — N52.1 ERECTILE DYSFUNCTION DUE TO DISEASES CLASSIFIED ELSEWHERE: Primary | ICD-10-CM

## 2017-04-06 NOTE — TELEPHONE ENCOUNTER
Routing refill request to provider for review/approval because:  Drug not active on patient's medication list    Gissel Cortes RN

## 2017-04-06 NOTE — TELEPHONE ENCOUNTER
CIALIS 5 MG tablet   (Historical)    Last Written Prescription Date:  Unknown  Last Fill Quantity: Unknown,   # refills: Unknown  Last Office Visit with G, P or [facility] prescribing provider: 3/31/2017    Future Office visit:    Next 5 appointments (look out 90 days)     Apr 10, 2017  2:00 PM CDT   Return Visit with Valentino Molina DPM   [facility] ([facility])    [address] [address] [phone]                   Routing refill request to provider for review/approval because:  Medication is reported/historical      TANYA Joaquin  April 6, 2017  11:08 AM

## 2017-04-07 RX ORDER — TADALAFIL 5 MG
TABLET ORAL
Qty: 30 TABLET | Refills: 0 | Status: SHIPPED | OUTPATIENT
Start: 2017-04-07 | End: 2018-02-16

## 2017-04-10 ENCOUNTER — OFFICE VISIT (OUTPATIENT)
Dept: PODIATRY | Facility: CLINIC | Age: 57
End: 2017-04-10
Payer: COMMERCIAL

## 2017-04-10 VITALS
SYSTOLIC BLOOD PRESSURE: 146 MMHG | BODY MASS INDEX: 35 KG/M2 | HEIGHT: 71 IN | WEIGHT: 250 LBS | DIASTOLIC BLOOD PRESSURE: 80 MMHG

## 2017-04-10 DIAGNOSIS — Z89.419: ICD-10-CM

## 2017-04-10 DIAGNOSIS — E11.42 TYPE 2 DIABETES MELLITUS WITH PERIPHERAL NEUROPATHY (H): Primary | ICD-10-CM

## 2017-04-10 PROCEDURE — 99213 OFFICE O/P EST LOW 20 MIN: CPT | Performed by: PODIATRIST

## 2017-04-10 NOTE — PATIENT INSTRUCTIONS
"[facility]  [facility]  [address]  [place] [address]  Phone: [phone]  Fax: [phone] [facility]  [address]  [place] [address]  Phone: [phone]   Fax: [phone]   [facility]  [address]  [place] [address]  Phone: [phone]  Fax: [phone] [facility]  [address]  [place] [address]  Phone: [phone]   Fax: [phone]   [facility]   [address]  [place] [address]  Phone: [phone]   Fax: [phone] * Please call any location listed to make an appointment for a casting/fitting. Your referral was sent to their central office and they will all have the order on file.           DIABETES AND YOUR FEET    What effect does diabetes have on the feet? Diabetes can result in several problems in the feet including ulcers (open sores) and amputations.  Two of the most important reasons why people develop foot problems when they have diabetes is :  1.  Neuropathy (loss of feeling) and 2.  Vascular disease (loss or decrease of blood flow).    What is neuropathy?  Neuropathy is a term used to describe a loss of nerve function.  Patients with diabetes are at risk of developing neuropathy if their sugars continue to run high and are above the normal value.  One theory for neuropathy is that the \"extra\" sugar in the body enters the nerves and is broken down.  These by-products build up in the nerve causing it to swell and impairing nerve function.  Often times, this can be prevented by controlling your sugars, dieting and exercise.    How do I know if I have neuropathy?  When a person develops neuropathy, they usually begin to feel numbness or tingling in their feet and sometime in their legs.  Other symptoms may include painful burning or hot feet, tingling or feeling like insects or ants are crawling " on your feet or legs.  If the diabetes is sever and the sugars run high for long periods of time, neuropathy can also occur in the hands.    What is vascular disease?  Vascular disease is a term used to describe a loss or decrease in circulation (blood flow).  There is a problem in getting blood and oxygen to areas that need it.  Similar to neuropathy, sugars can build up in the walls of the arteries (blood vessels) and cause them to become swollen, thickened and hardened.  This decreases the amount of blood that can go to an area that needs it.  Though this is common in the legs of diabetic patients, it can also affect other arteries (blood vessels) in the body such as in the heart and eyes.    How do I know if I have vascular disease?  In the legs, vascular disease usually results in cramping.  Patients who develop leg cramps after walking the same distance every time (i.e. One block, half a mile, ect.) need to let their doctors know so that their circulation may be checked.  Cramps causing severe pain in the feet and/or legs while sleeping and the cramps go away when you stand or hang your legs off the side of the bed, may also be a sign of poor blood circulation.  Occasional cramping in cold weather or on rare occasions with activity may not be due to poor circulation, but you should inform your doctor.    How can these problems be prevented?  The key to prevention is good blood sugar control.  Poor blood sugar control is a big reason many of these problems start.  Physical activity (exercise) is a very good way to help decrease your blood sugars.  Exercise can lower your blood sugar, blood pressure, and cholesterol.  It also reduces your risk for heart disease and stroke, relieves stress, and strengthens your heart, muscles and bones.  In addition, regular activity helps insulin work better, improves your blood circulation, and keeps your joints flexible.  If you're trying to lose weight, a combination of  exercise and wise food choices can help you reach your target weight and maintain it.      Pain Management Strategies:  1.Blood Sugar Control - Most important  2. Medications such as:  Amytriptylline, duloxetine, gabapentin, lyrica, tramadol  3. Nutritional therapy:  Vitamin B6 (100mg daily), Vitamin B12 (75mcg daily), Vitamin D 2000 IU daily), Alpha-Lipoic Acid (600-1800mg daily), Acetyl-L-Carnitine (500-1000mg TID, L-methyl folate (1500mcg daily)    ** Metformin can block Vitamin B6 and B12 so it is important to supplement**    Diabetic Foot Care Recommendations For Avoiding Serious Foot Issues    DO'S   1.Wash your feet with lukewarm water and a mild soap and then dry them thoroughly, especially between the toes.     2. Examine your feet daily looking for cuts, corns, blisters, cracks, ect..., especially after wearing new shoes.  Make sure to look between your toes.  If you cannot see the bottom of your feet, set a mirror on the floor and hold your foot over it, or ask a spouse, friend or family member to examine your feet for you.  Contact your doctor immediately if new problems are noted or if sores are not healing.     3.  Immediately apply moisturizer to the tops and bottoms of your feet, avoiding areas between the toes.  Hand lotion (Intesive Care, Candie, Eucerin, Neutrogena, Curel, ect...) is sufficient unless your doctor prescribes a medicated lotion.  Apply sunscreen to your feet when going swimming outside.     4.Use clean comfortable shoes, wear white socks (if you have any bleeding or drainage, you will see it on white socks).  Socks should not have thick seams or cut off the circulation around the leg.  Break in new shoes slowly and rotate with older shoes until broken in.  Check the inside of your shoes with your hand to look for areas of irritation or objects that may have fallen into your shoes.       5. Keep slippers by the side of your bed for use during the night.     6. Shoes should be fitted  by a professional and should not cause areas of irritation.  Check your feet regularly when wearing a new pair of shoes and replace them as needed.     7.  Talk to your doctor about proper exercise.  Exercise and stretching stimulate blood flow to your feet and maintain proper glucose levels.     8.  Monitor your blood glucose level as instructed by your doctor.  Notify your doctor immediately if your blood sugar is abnormally high or low.     9.  Cut your nails straight across, but then gently round any sharp edges with a cardboard nail file.  If you have neuropathy, peripheral vascular disease or cannot see that well to trim your own toenails, see a medical professional for care.    DONT'S   1.  Do not soak your feet if you have an open sore.  Use only lukewarm water and always check the temperature with your hand as hot water can easily burn your feet.       2.  Never use a hot water bottle or heating pad on your feet.  Also do not apply cold compresses to your feet.  With decreased sensation, you could burn or freeze your feet.       3.  Do not apply any of these to your feet:    -  Over the counter medicine for corns or warts    -  Harsh chemicals like boric acid    -  Do not self-treat corns, cuts, blisters or infections.  Always consult your doctor.       4.  Do not wear sandals, slippers or walk barefoot, especially on hot sand or concrete or other harsh surfaces.     5.  If you smoke, stop!!!

## 2017-04-10 NOTE — LETTER
4/10/2017       RE: Cripsin Rojas  [address] [facility] [address]           Dear Colleague,    Thank you for referring your patient, Crispin Rojas, to the [facility]. Please see a copy of my visit note below.    ASSESSMENT/PLAN:    Encounter Diagnoses   Name Primary?     Type 2 diabetes mellitus with peripheral neuropathy (H) Yes     Status post amputation of great toe, unspecified laterality (H)      Pt is instructed to inspect feet daily, wear a supportive shoe at all times, and avoid walking barefoot.  He is instructed to call if any sores develop.   Importance of good blood sugar control emphasized.   Pt verbalized understanding.    Pt is referred to the [facility] for  prescription multi-density orthoses and extra-depth shoes. I explained the advantage of the inserts.      Follow up as needed and yearly for a diabetic foot exam    Weight management plan: Patient was referred to their PCP to discuss a diet and exercise plan.      Valentino Molina DPM, FACFAS, MS    [facility] of Podiatry/Foot & Ankle Surgery      ____________________________________________________________________    HPI:         Chief Complaint: diabetic foot exam  Type 2 DM with peripheral neuropathy  No pain.  History of partial amputation of the right hallux and total amputation of the left, for treatment of osteomyelitis.  He does not use custom orthoses, but thinks we talked about it at a previous visit.    Last Hgb A1C = 7.5%.    Past Medical History:   Diagnosis Date     Chronic infection      H/O heartburn      History of heartburn      Hypertension      Numbness and tingling      Obesity      Type II or unspecified type diabetes mellitus without mention of complication, not stated as uncontrolled    *  *  Past Surgical History:   Procedure Laterality Date     AMPUTATE FOOT Left 8/18/2016    Procedure: AMPUTATE FOOT;  Surgeon: Jack Younger DPM;  Location:  [facility] OR     AMPUTATE TOE(S) Right 2/1/2016    Procedure: AMPUTATE TOE(S);  Surgeon: Rachelle Manriquez DPM, Pod;  Location: [facility] OR     ANGIOGRAM       COLONOSCOPY       COMBINED CYSTOSCOPY, RETROGRADES, URETEROSCOPY, INSERT STENT Left 8/21/2016    Procedure: COMBINED CYSTOSCOPY, RETROGRADES, URETEROSCOPY, INSERT STENT;  Surgeon: Artemio Valenzuela MD;  Location: [facility] OR     COMBINED CYSTOSCOPY, RETROGRADES, URETEROSCOPY, LASER HOLMIUM LITHOTRIPSY URETER(S), INSERT STENT Left 10/3/2016    Procedure: COMBINED CYSTOSCOPY, RETROGRADES, URETEROSCOPY, LASER HOLMIUM LITHOTRIPSY URETER(S), INSERT STENT;  Surgeon: Artemio Valenzuela MD;  Location: [facility] OR     EXTRACORPOREAL SHOCK WAVE LITHOTRIPSY (ESWL) Left 9/8/2016    Procedure: EXTRACORPOREAL SHOCK WAVE LITHOTRIPSY (ESWL);  Surgeon: Artemio Valenzuela MD;  Location: [facility] OR     RECESSION GASTROCNEMIUS Right 2/1/2016    Procedure: RECESSION GASTROCNEMIUS;  Surgeon: Rachelle Manriquez DPM, Pod;  Location: [facility] OR   *  *  Current Outpatient Prescriptions   Medication Sig Dispense Refill     CIALIS 5 MG tablet TAKE 1 TABLET BY MOUTH EVERY DAY AS NEEDED 30 tablet 0     insulin glargine (LANTUS SOLOSTAR) 100 UNIT/ML injection Take up to 100 units daily 60 mL 1     insulin aspart (NOVOLOG FLEXPEN) 100 UNIT/ML injection For mealtime carbohydrate and hgm coverage 10-35 units three times daily 30 mL 1     blood glucose monitoring (NO BRAND SPECIFIED) meter device kit Use to test blood sugar 3 times daily or as directed. 1 kit 0     blood glucose monitoring (NO BRAND SPECIFIED) test strip Use to test blood sugars 3 times daily or as directed 300 strip 3     ketoconazole (NIZORAL) 2 % shampoo Apply to the affected area and wash off after 5 minutes. 120 mL 11     amLODIPine (NORVASC) 5 MG tablet Take 1 tablet (5 mg) by mouth daily 90 tablet 1     lisinopril (PRINIVIL,ZESTRIL) 10 MG tablet Take 1 tablet (10 mg) by mouth daily 90 tablet 3     metFORMIN (GLUCOPHAGE-XR) 500 MG 24 hr tablet  "Take 2 tablets (1,000 mg) by mouth 2 times daily (with meals) 360 tablet 1     atorvastatin (LIPITOR) 40 MG tablet Take 1 tablet (40 mg) by mouth daily 30 tablet 11     Co-Enzyme Q10 100 MG CAPS Take 100 mg by mouth daily        Cyanocobalamin (VITAMIN B 12 PO) Take 500 mcg by mouth daily        Cholecalciferol (VITAMIN D3 PO) Take 1,000 Units by mouth daily        Multiple Vitamins-Minerals (MULTIVITAMIN PO) Take 1 tablet by mouth daily        Omega-3 Fatty Acids (OMEGA-3 FISH OIL PO) Take 2 g by mouth daily        lancets thin MISC 1 each 3 times daily. 300 each 3     Insulin Syringe-Needle U-100 (BD INSULIN SYRINGE ULTRAFINE) 30G X 1/2\" 0.3 ML MISC 1 Syringe daily. Use one syringe  daily or as directed. 100 each prn       EXAM:    Vitals: /80 (BP Location: Right arm, Cuff Size: Adult Large)  Ht 5' 11\" (1.803 m)  Wt 250 lb (113.4 kg)  BMI 34.87 kg/m2  BMI: Body mass index is 34.87 kg/(m^2).  Height: 5' 11\"    Constitutional/ general:  Pt is in no apparent distress, appears well-nourished.  Cooperative with history and physical exam.     Vascular:  Pedal pulses are palpable bilaterally for both the DP and PT arteries.  CFT < 3 sec.  No edema.  Pedal hair growth noted.     Neuro:  Alert and oriented x 3. Coordinated gait.  Light touch sensation is diminished to the L4, L5, S1 distributions. No obvious deficits.  No evidence of neurological-based weakness, spasticity, or contracture in the lower extremities.     Protective sensation is absent per [employer] Monofilament testing.    Derm: Normal texture and turgor.  No erythema, ecchymosis, or cyanosis.  No open lesions.   Nails on the right foot are elongated.     Musculoskeletal:    Lower extremity muscle strength is normal.  Patient is ambulatory without an assistive device or brace .  Partial amputation right hallux. Total amputation left hallux.  Ankle and subtalar joint ROM within normal limits B/L.     Valentino Molina, SHRUTHI, FACFAS, MS    [facility] " Department of Podiatry/Foot & Ankle Surgery              Again, thank you for allowing me to participate in the care of your patient.        Sincerely,              Valentino Molina DPM

## 2017-04-10 NOTE — NURSING NOTE
"Chief Complaint   Patient presents with     Diabetes     annual diabetic foot exam- some numbness of both feet        Initial /80 (BP Location: Right arm, Cuff Size: Adult Large)  Ht 5' 11\" (1.803 m)  Wt 250 lb (113.4 kg)  BMI 34.87 kg/m2 Estimated body mass index is 34.87 kg/(m^2) as calculated from the following:    Height as of this encounter: 5' 11\" (1.803 m).    Weight as of this encounter: 250 lb (113.4 kg).  Medication Reconciliation: complete   Obdulia Ireland MA      "

## 2017-04-10 NOTE — MR AVS SNAPSHOT
"              After Visit Summary   4/10/2017    Crispin Rojas    MRN: [id number]           Patient Information     Date Of Birth          1960        Visit Information        Provider Department      4/10/2017 2:00 PM Valentino Molina DPM [facility]        Care Instructions    [place] [facility]  [facility]  [address]  [place] [address]  Phone: [phone]  Fax: [phone] [facility]  [address]  [place] [address]  Phone: [phone]   Fax: [phone]   [facility]  [address]  [place] [address]  Phone: [phone]  Fax: [phone] [facility]  [address]  [place] [address]  Phone: [phone]   Fax: [phone]   [facility]   [address]  [place] [address]  Phone: [phone]   Fax: [phone] * Please call any location listed to make an appointment for a casting/fitting. Your referral was sent to their central office and they will all have the order on file.           DIABETES AND YOUR FEET    What effect does diabetes have on the feet? Diabetes can result in several problems in the feet including ulcers (open sores) and amputations.  Two of the most important reasons why people develop foot problems when they have diabetes is :  1.  Neuropathy (loss of feeling) and 2.  Vascular disease (loss or decrease of blood flow).    What is neuropathy?  Neuropathy is a term used to describe a loss of nerve function.  Patients with diabetes are at risk of developing neuropathy if their sugars continue to run high and are above the normal value.  One theory for neuropathy is that the \"extra\" sugar in the body enters the nerves and is broken down.  These by-products build up in the nerve causing it to swell and impairing nerve function.  Often times, this can be prevented by " controlling your sugars, dieting and exercise.    How do I know if I have neuropathy?  When a person develops neuropathy, they usually begin to feel numbness or tingling in their feet and sometime in their legs.  Other symptoms may include painful burning or hot feet, tingling or feeling like insects or ants are crawling on your feet or legs.  If the diabetes is sever and the sugars run high for long periods of time, neuropathy can also occur in the hands.    What is vascular disease?  Vascular disease is a term used to describe a loss or decrease in circulation (blood flow).  There is a problem in getting blood and oxygen to areas that need it.  Similar to neuropathy, sugars can build up in the walls of the arteries (blood vessels) and cause them to become swollen, thickened and hardened.  This decreases the amount of blood that can go to an area that needs it.  Though this is common in the legs of diabetic patients, it can also affect other arteries (blood vessels) in the body such as in the heart and eyes.    How do I know if I have vascular disease?  In the legs, vascular disease usually results in cramping.  Patients who develop leg cramps after walking the same distance every time (i.e. One block, half a mile, ect.) need to let their doctors know so that their circulation may be checked.  Cramps causing severe pain in the feet and/or legs while sleeping and the cramps go away when you stand or hang your legs off the side of the bed, may also be a sign of poor blood circulation.  Occasional cramping in cold weather or on rare occasions with activity may not be due to poor circulation, but you should inform your doctor.    How can these problems be prevented?  The key to prevention is good blood sugar control.  Poor blood sugar control is a big reason many of these problems start.  Physical activity (exercise) is a very good way to help decrease your blood sugars.  Exercise can lower your blood sugar, blood  pressure, and cholesterol.  It also reduces your risk for heart disease and stroke, relieves stress, and strengthens your heart, muscles and bones.  In addition, regular activity helps insulin work better, improves your blood circulation, and keeps your joints flexible.  If you're trying to lose weight, a combination of exercise and wise food choices can help you reach your target weight and maintain it.      Pain Management Strategies:  1.Blood Sugar Control - Most important  2. Medications such as:  Amytriptylline, duloxetine, gabapentin, lyrica, tramadol  3. Nutritional therapy:  Vitamin B6 (100mg daily), Vitamin B12 (75mcg daily), Vitamin D 2000 IU daily), Alpha-Lipoic Acid (600-1800mg daily), Acetyl-L-Carnitine (500-1000mg TID, L-methyl folate (1500mcg daily)    ** Metformin can block Vitamin B6 and B12 so it is important to supplement**    Diabetic Foot Care Recommendations For Avoiding Serious Foot Issues    DO'S   1.Wash your feet with lukewarm water and a mild soap and then dry them thoroughly, especially between the toes.     2. Examine your feet daily looking for cuts, corns, blisters, cracks, ect..., especially after wearing new shoes.  Make sure to look between your toes.  If you cannot see the bottom of your feet, set a mirror on the floor and hold your foot over it, or ask a spouse, friend or family member to examine your feet for you.  Contact your doctor immediately if new problems are noted or if sores are not healing.     3.  Immediately apply moisturizer to the tops and bottoms of your feet, avoiding areas between the toes.  Hand lotion (Intesive Care, Candie, Eucerin, Neutrogena, Curel, ect...) is sufficient unless your doctor prescribes a medicated lotion.  Apply sunscreen to your feet when going swimming outside.     4.Use clean comfortable shoes, wear white socks (if you have any bleeding or drainage, you will see it on white socks).  Socks should not have thick seams or cut off the  circulation around the leg.  Break in new shoes slowly and rotate with older shoes until broken in.  Check the inside of your shoes with your hand to look for areas of irritation or objects that may have fallen into your shoes.       5. Keep slippers by the side of your bed for use during the night.     6. Shoes should be fitted by a professional and should not cause areas of irritation.  Check your feet regularly when wearing a new pair of shoes and replace them as needed.     7.  Talk to your doctor about proper exercise.  Exercise and stretching stimulate blood flow to your feet and maintain proper glucose levels.     8.  Monitor your blood glucose level as instructed by your doctor.  Notify your doctor immediately if your blood sugar is abnormally high or low.     9.  Cut your nails straight across, but then gently round any sharp edges with a cardboard nail file.  If you have neuropathy, peripheral vascular disease or cannot see that well to trim your own toenails, see a medical professional for care.    DONT'S   1.  Do not soak your feet if you have an open sore.  Use only lukewarm water and always check the temperature with your hand as hot water can easily burn your feet.       2.  Never use a hot water bottle or heating pad on your feet.  Also do not apply cold compresses to your feet.  With decreased sensation, you could burn or freeze your feet.       3.  Do not apply any of these to your feet:    -  Over the counter medicine for corns or warts    -  Harsh chemicals like boric acid    -  Do not self-treat corns, cuts, blisters or infections.  Always consult your doctor.       4.  Do not wear sandals, slippers or walk barefoot, especially on hot sand or concrete or other harsh surfaces.     5.  If you smoke, stop!!!                Follow-ups after your visit        Your next 10 appointments already scheduled     Apr 25, 2017  4:20 PM CDT   [facility] with Nahum Dewey PT   [facility] [place]    [address] [place] [address] [phone]            May 16, 2017  8:30 AM CDT   New Visit with Erwin Mccarthy MD   [facility] ([facility]    [address]  [place] [address] [phone]              Who to contact     If you have questions or need follow up information about today's clinic visit or your schedule please contact [facility] directly at [phone].  Normal or non-critical lab and imaging results will be communicated to you by MyChart, letter or phone within 4 business days after the clinic has received the results. If you do not hear from us within 7 days, please contact the clinic through [employer] or phone. If you have a critical or abnormal lab result, we will notify you by phone as soon as possible.  Submit refill requests through [employer] or call your pharmacy and they will forward the refill request to us. Please allow 3 business days for your refill to be completed.          Additional Information About Your Visit        MyChart Information     [employer] gives you secure access to your electronic health record. If you see a primary care provider, you can also send messages to your care team and make appointments. If you have questions, please call your primary care clinic.  If you do not have a primary care provider, please call [phone] and they will assist you.        Care EveryWhere ID     This is your Care EveryWhere ID. This could be used by other organizations to access your [place] medical records  [id number]        Your Vitals Were     Height BMI (Body Mass Index)                5' 11\" (1.803 m) 34.87 kg/m2           Blood Pressure from Last 3 Encounters:   04/10/17 146/80   03/31/17 134/68   02/23/17 144/74    Weight from Last 3 Encounters:   04/10/17 250 lb (113.4 kg)   03/31/17 250 lb 4.8 oz (113.5 kg)   02/23/17 " "257 lb (116.6 kg)              Today, you had the following     No orders found for display       Primary Care Provider Office Phone # Fax #    Aden Lopez [phone]       [facility] [address] [address]  [facility] [address]        Thank you!     Thank you for choosing [facility]  for your care. Our goal is always to provide you with excellent care. Hearing back from our patients is one way we can continue to improve our services. Please take a few minutes to complete the written survey that you may receive in the mail after your visit with us. Thank you!             Your Updated Medication List - Protect others around you: Learn how to safely use, store and throw away your medicines at www.disposemymeds.org.          This list is accurate as of: 4/10/17  2:19 PM.  Always use your most recent med list.                   Brand Name Dispense Instructions for use    amLODIPine 5 MG tablet    NORVASC    90 tablet    Take 1 tablet (5 mg) by mouth daily       atorvastatin 40 MG tablet    LIPITOR    30 tablet    Take 1 tablet (40 mg) by mouth daily       BD insulin syringe ultrafine 30G X 1/2\" 0.3 ML   Generic drug:  insulin syringe-needle U-100     100 each    1 Syringe daily. Use one syringe  daily or as directed.       blood glucose monitoring meter device kit    no brand specified    1 kit    Use to test blood sugar 3 times daily or as directed.       blood glucose monitoring test strip    no brand specified    300 strip    Use to test blood sugars 3 times daily or as directed       CIALIS 5 MG tablet   Generic drug:  tadalafil     30 tablet    TAKE 1 TABLET BY MOUTH EVERY DAY AS NEEDED       Co-Enzyme Q10 100 MG Caps      Take 100 mg by mouth daily       insulin aspart 100 UNIT/ML injection    NovoLOG FLEXPEN    30 mL    For mealtime carbohydrate and hgm coverage 10-35 units three times daily       insulin glargine 100 UNIT/ML injection    LANTUS " SOLOSTAR    60 mL    Take up to 100 units daily       ketoconazole 2 % shampoo    NIZORAL    120 mL    Apply to the affected area and wash off after 5 minutes.       lisinopril 10 MG tablet    PRINIVIL/ZESTRIL    90 tablet    Take 1 tablet (10 mg) by mouth daily       metFORMIN 500 MG 24 hr tablet    GLUCOPHAGE-XR    360 tablet    Take 2 tablets (1,000 mg) by mouth 2 times daily (with meals)       MULTIVITAMIN PO      Take 1 tablet by mouth daily       OMEGA-3 FISH OIL PO      Take 2 g by mouth daily       thin lancets    NO BRAND SPECIFIED    300 each    1 each 3 times daily.       VITAMIN B 12 PO      Take 500 mcg by mouth daily       VITAMIN D3 PO      Take 1,000 Units by mouth daily

## 2017-04-10 NOTE — PROGRESS NOTES
ASSESSMENT/PLAN:    Encounter Diagnoses   Name Primary?     Type 2 diabetes mellitus with peripheral neuropathy (H) Yes     Status post amputation of great toe, unspecified laterality (H)      Pt is instructed to inspect feet daily, wear a supportive shoe at all times, and avoid walking barefoot.  He is instructed to call if any sores develop.   Importance of good blood sugar control emphasized.   Pt verbalized understanding.    Pt is referred to the [facility] for  prescription multi-density orthoses and extra-depth shoes. I explained the advantage of the inserts.      Follow up as needed and yearly for a diabetic foot exam    Weight management plan: Patient was referred to their PCP to discuss a diet and exercise plan.      Valentino Molina, SHRUTHI, FACFAS, MS    [place] [facility] Ankle [facility]      ____________________________________________________________________    HPI:         Chief Complaint: diabetic foot exam  Type 2 DM with peripheral neuropathy  No pain.  History of partial amputation of the right hallux and total amputation of the left, for treatment of osteomyelitis.  He does not use custom orthoses, but thinks we talked about it at a previous visit.    Last Hgb A1C = 7.5%.    Past Medical History:   Diagnosis Date     Chronic infection      H/O heartburn      History of heartburn      Hypertension      Numbness and tingling      Obesity      Type II or unspecified type diabetes mellitus without mention of complication, not stated as uncontrolled    *  *  Past Surgical History:   Procedure Laterality Date     AMPUTATE FOOT Left 8/18/2016    Procedure: AMPUTATE FOOT;  Surgeon: Jack Younger DPM;  Location: [facility] OR     AMPUTATE TOE(S) Right 2/1/2016    Procedure: AMPUTATE TOE(S);  Surgeon: Rachelle Manriquez DPM, Pod;  Location: [facility] OR     ANGIOGRAM       COLONOSCOPY       COMBINED CYSTOSCOPY, RETROGRADES, URETEROSCOPY, INSERT STENT Left 8/21/2016    Procedure:  COMBINED CYSTOSCOPY, RETROGRADES, URETEROSCOPY, INSERT STENT;  Surgeon: Artemio Valenzuela MD;  Location: [facility] OR     COMBINED CYSTOSCOPY, RETROGRADES, URETEROSCOPY, LASER HOLMIUM LITHOTRIPSY URETER(S), INSERT STENT Left 10/3/2016    Procedure: COMBINED CYSTOSCOPY, RETROGRADES, URETEROSCOPY, LASER HOLMIUM LITHOTRIPSY URETER(S), INSERT STENT;  Surgeon: Artemio Valenzuela MD;  Location: [facility] OR     EXTRACORPOREAL SHOCK WAVE LITHOTRIPSY (ESWL) Left 9/8/2016    Procedure: EXTRACORPOREAL SHOCK WAVE LITHOTRIPSY (ESWL);  Surgeon: Artemio Valenzuela MD;  Location: [facility] OR     RECESSION GASTROCNEMIUS Right 2/1/2016    Procedure: RECESSION GASTROCNEMIUS;  Surgeon: Rachelle Manriquez DPM, Pod;  Location: [facility] OR   *  *  Current Outpatient Prescriptions   Medication Sig Dispense Refill     CIALIS 5 MG tablet TAKE 1 TABLET BY MOUTH EVERY DAY AS NEEDED 30 tablet 0     insulin glargine (LANTUS SOLOSTAR) 100 UNIT/ML injection Take up to 100 units daily 60 mL 1     insulin aspart (NOVOLOG FLEXPEN) 100 UNIT/ML injection For mealtime carbohydrate and hgm coverage 10-35 units three times daily 30 mL 1     blood glucose monitoring (NO BRAND SPECIFIED) meter device kit Use to test blood sugar 3 times daily or as directed. 1 kit 0     blood glucose monitoring (NO BRAND SPECIFIED) test strip Use to test blood sugars 3 times daily or as directed 300 strip 3     ketoconazole (NIZORAL) 2 % shampoo Apply to the affected area and wash off after 5 minutes. 120 mL 11     amLODIPine (NORVASC) 5 MG tablet Take 1 tablet (5 mg) by mouth daily 90 tablet 1     lisinopril (PRINIVIL,ZESTRIL) 10 MG tablet Take 1 tablet (10 mg) by mouth daily 90 tablet 3     metFORMIN (GLUCOPHAGE-XR) 500 MG 24 hr tablet Take 2 tablets (1,000 mg) by mouth 2 times daily (with meals) 360 tablet 1     atorvastatin (LIPITOR) 40 MG tablet Take 1 tablet (40 mg) by mouth daily 30 tablet 11     Co-Enzyme Q10 100 MG CAPS Take 100 mg by mouth daily        Cyanocobalamin (VITAMIN  "B 12 PO) Take 500 mcg by mouth daily        Cholecalciferol (VITAMIN D3 PO) Take 1,000 Units by mouth daily        Multiple Vitamins-Minerals (MULTIVITAMIN PO) Take 1 tablet by mouth daily        Omega-3 Fatty Acids (OMEGA-3 FISH OIL PO) Take 2 g by mouth daily        lancets thin MISC 1 each 3 times daily. 300 each 3     Insulin Syringe-Needle U-100 (BD INSULIN SYRINGE ULTRAFINE) 30G X 1/2\" 0.3 ML MISC 1 Syringe daily. Use one syringe  daily or as directed. 100 each prn       EXAM:    Vitals: /80 (BP Location: Right arm, Cuff Size: Adult Large)  Ht 5' 11\" (1.803 m)  Wt 250 lb (113.4 kg)  BMI 34.87 kg/m2  BMI: Body mass index is 34.87 kg/(m^2).  Height: 5' 11\"    Constitutional/ general:  Pt is in no apparent distress, appears well-nourished.  Cooperative with history and physical exam.     Vascular:  Pedal pulses are palpable bilaterally for both the DP and PT arteries.  CFT < 3 sec.  No edema.  Pedal hair growth noted.     Neuro:  Alert and oriented x 3. Coordinated gait.  Light touch sensation is diminished to the L4, L5, S1 distributions. No obvious deficits.  No evidence of neurological-based weakness, spasticity, or contracture in the lower extremities.     Protective sensation is absent per Ellerbe-Elias Monofilament testing.    Derm: Normal texture and turgor.  No erythema, ecchymosis, or cyanosis.  No open lesions.   Nails on the right foot are elongated.     Musculoskeletal:    Lower extremity muscle strength is normal.  Patient is ambulatory without an assistive device or brace .  Partial amputation right hallux. Total amputation left hallux.  Ankle and subtalar joint ROM within normal limits B/L.     Valentino Molina DPM, FACFAS, MS    [place] [facility] Pod[facility]ry/Foot & Ankle Surgery            "

## 2017-04-11 DIAGNOSIS — G45.8 OTHER SPECIFIED TRANSIENT CEREBRAL ISCHEMIAS: ICD-10-CM

## 2017-04-11 NOTE — TELEPHONE ENCOUNTER
amLODIPine (NORVASC) 5 MG tablet      Last Written Prescription Date: 9/1/16  Last Fill Quantity: 90, # refills: 1  Last Office Visit with G, P or [facility] prescribing provider: 3/31/2017         Potassium   Date Value Ref Range Status   03/31/2017 4.4 3.4 - 5.3 mmol/L Final     Creatinine   Date Value Ref Range Status   03/31/2017 0.89 0.66 - 1.25 mg/dL Final     BP Readings from Last 3 Encounters:   04/10/17 146/80   03/31/17 134/68   02/23/17 144/74         TANYA Joaquin  April 11, 2017  3:59 PM

## 2017-04-12 RX ORDER — AMLODIPINE BESYLATE 5 MG/1
5 TABLET ORAL DAILY
Qty: 90 TABLET | Refills: 1 | Status: SHIPPED | OUTPATIENT
Start: 2017-04-12 | End: 2017-10-26

## 2017-04-12 NOTE — TELEPHONE ENCOUNTER
Prescription approved per [facility] Refill Protocol  Cara Cheng RN BS

## 2017-04-18 ENCOUNTER — TRANSFERRED RECORDS (OUTPATIENT)
Dept: HEALTH INFORMATION MANAGEMENT | Facility: CLINIC | Age: 57
End: 2017-04-18

## 2017-04-19 ENCOUNTER — TRANSFERRED RECORDS (OUTPATIENT)
Dept: HEALTH INFORMATION MANAGEMENT | Facility: CLINIC | Age: 57
End: 2017-04-19

## 2017-04-25 ENCOUNTER — THERAPY VISIT (OUTPATIENT)
Dept: PHYSICAL THERAPY | Facility: CLINIC | Age: 57
End: 2017-04-25
Payer: COMMERCIAL

## 2017-04-25 DIAGNOSIS — M54.2 CERVICALGIA: ICD-10-CM

## 2017-04-25 DIAGNOSIS — G89.29 CHRONIC LEFT SHOULDER PAIN: ICD-10-CM

## 2017-04-25 DIAGNOSIS — M25.512 CHRONIC LEFT SHOULDER PAIN: ICD-10-CM

## 2017-04-25 PROCEDURE — 97110 THERAPEUTIC EXERCISES: CPT | Mod: GP | Performed by: PHYSICAL THERAPIST

## 2017-04-25 NOTE — PROGRESS NOTES
Subjective:    HPI                    Objective:    System    Physical Exam    General     ROS    Assessment/Plan:      DISCHARGE REPORT    Progress reporting period is from initial to 4/25.       SUBJECTIVE  Subjective changes noted by patient:  Crispin returns to PT feeling intermittent neck pain and L shoulder pain.  Pt feels his L shoulder is stronger and moving around work more, and golfed 4 rounds this year so far, arm feels good with golf.    Current pain level is 3/10  .     Previous pain level was  6/10  .   Changes in function:  Yes (See Goal flowsheet attached for changes in current functional level)  Adverse reaction to treatment or activity: None    OBJECTIVE  Changes noted in objective findings:  Yes, CROM Flex WNL, Ext WNL (better), Rot WNL, SB L w sharp pain - reps worse.  SB R less L neck pain - repeated R SB improved L neck pain.  Shoulder AROM Flex WFL, Abd 110 w pain, Ext/IR L3 w pain.  MMT ER 5/5 no pain, IR 5/5 no pain    ASSESSMENT/PLAN  Updated problem list and treatment plan: Diagnosis 1:  L neck and L shoulder pain OA  Pain -  hot/cold therapy, self management, education and home program  Decreased ROM/flexibility - manual therapy and therapeutic exercise  Decreased function - therapeutic activities  Impaired posture - neuro re-education  STG/LTGs have been met or progress has been made towards goals:  Yes (See Goal flow sheet completed today.)  Assessment of Progress: The patient's condition is improving.  Self Management Plans:  Patient has been instructed in a home treatment program.  Patient  has been instructed in self management of symptoms.  I have re-evaluated this patient and find that the nature, scope, duration and intensity of the therapy is appropriate for the medical condition of the patient.  Crispin continues to require the following intervention to meet STG and LTG's:  PT intervention is no longer required to meet STG/LTG.    Recommendations:  This patient is ready to be  discharged from therapy and continue their home treatment program.    Please refer to the daily flowsheet for treatment today, total treatment time and time spent performing 1:1 timed codes.

## 2017-04-25 NOTE — MR AVS SNAPSHOT
After Visit Summary   4/25/2017    Crispin Rojas    MRN: [id number]           Patient Information     Date Of Birth          1960        Visit Information        Provider Department      4/25/2017 4:20 PM Nahum Dewey PT [facility]        Today's Diagnoses     Chronic left shoulder pain        Cervicalgia           Follow-ups after your visit        Your next 10 appointments already scheduled     May 16, 2017  8:30 AM CDT   New Visit with Erwin Mccarthy MD   [facility] ([facility])    [address]  [place] [id number] [phone]              Who to contact     If you have questions or need follow up information about today's clinic visit or your schedule please contact [facility] [place] directly at [phone].  Normal or non-critical lab and imaging results will be communicated to you by MyChart, letter or phone within 4 business days after the clinic has received the results. If you do not hear from us within 7 days, please contact the clinic through Profitecthart or phone. If you have a critical or abnormal lab result, we will notify you by phone as soon as possible.  Submit refill requests through Motorator or call your pharmacy and they will forward the refill request to us. Please allow 3 business days for your refill to be completed.          Additional Information About Your Visit        MyChart Information     Motorator gives you secure access to your electronic health record. If you see a primary care provider, you can also send messages to your care team and make appointments. If you have questions, please call your primary care clinic.  If you do not have a primary care provider, please call [phone] and they will assist you.        Care EveryWhere ID     This is your Care EveryWhere ID. This could be used by other organizations to  "access your [facility] medical records  [id number]         Blood Pressure from Last 3 Encounters:   04/10/17 146/80   03/31/17 134/68   02/23/17 144/74    Weight from Last 3 Encounters:   04/10/17 113.4 kg (250 lb)   03/31/17 113.5 kg (250 lb 4.8 oz)   02/23/17 116.6 kg (257 lb)              We Performed the Following     [facility] PROGRESS NOTES REPORT     THERAPEUTIC EXERCISES        Primary Care Provider Office Phone # Fax #    Aden Lopez [phone]       [facility] [id number] [place] [address]        Thank you!     Thank you for choosing [facility] [place]  for your care. Our goal is always to provide you with excellent care. Hearing back from our patients is one way we can continue to improve our services. Please take a few minutes to complete the written survey that you may receive in the mail after your visit with us. Thank you!             Your Updated Medication List - Protect others around you: Learn how to safely use, store and throw away your medicines at www.disposemymeds.org.          This list is accurate as of: 4/25/17  4:50 PM.  Always use your most recent med list.                   Brand Name Dispense Instructions for use    amLODIPine 5 MG tablet    NORVASC    90 tablet    Take 1 tablet (5 mg) by mouth daily       atorvastatin 40 MG tablet    LIPITOR    30 tablet    Take 1 tablet (40 mg) by mouth daily       BD insulin syringe ultrafine 30G X 1/2\" 0.3 ML   Generic drug:  insulin syringe-needle U-100     100 each    1 Syringe daily. Use one syringe  daily or as directed.       blood glucose monitoring meter device kit    no brand specified    1 kit    Use to test blood sugar 3 times daily or as directed.       blood glucose monitoring test strip    no brand specified    300 strip    Use to test blood sugars 3 times daily or as directed       CIALIS 5 MG tablet   Generic drug:  tadalafil     30 tablet    TAKE 1 TABLET BY MOUTH " EVERY DAY AS NEEDED       Co-Enzyme Q10 100 MG Caps      Take 100 mg by mouth daily       insulin aspart 100 UNIT/ML injection    NovoLOG FLEXPEN    30 mL    For mealtime carbohydrate and hgm coverage 10-35 units three times daily       insulin glargine 100 UNIT/ML injection    LANTUS SOLOSTAR    60 mL    Take up to 100 units daily       ketoconazole 2 % shampoo    NIZORAL    120 mL    Apply to the affected area and wash off after 5 minutes.       lisinopril 10 MG tablet    PRINIVIL/ZESTRIL    90 tablet    Take 1 tablet (10 mg) by mouth daily       metFORMIN 500 MG 24 hr tablet    GLUCOPHAGE-XR    360 tablet    Take 2 tablets (1,000 mg) by mouth 2 times daily (with meals)       MULTIVITAMIN PO      Take 1 tablet by mouth daily       OMEGA-3 FISH OIL PO      Take 2 g by mouth daily       thin lancets    NO BRAND SPECIFIED    300 each    1 each 3 times daily.       VITAMIN B 12 PO      Take 500 mcg by mouth daily       VITAMIN D3 PO      Take 1,000 Units by mouth daily

## 2017-05-02 ENCOUNTER — TRANSFERRED RECORDS (OUTPATIENT)
Dept: HEALTH INFORMATION MANAGEMENT | Facility: CLINIC | Age: 57
End: 2017-05-02

## 2017-05-16 ENCOUNTER — OFFICE VISIT (OUTPATIENT)
Dept: OTHER | Facility: CLINIC | Age: 57
End: 2017-05-16
Attending: INTERNAL MEDICINE
Payer: COMMERCIAL

## 2017-05-16 VITALS
WEIGHT: 251 LBS | OXYGEN SATURATION: 97 % | HEIGHT: 71 IN | DIASTOLIC BLOOD PRESSURE: 75 MMHG | HEART RATE: 52 BPM | SYSTOLIC BLOOD PRESSURE: 154 MMHG | BODY MASS INDEX: 35.14 KG/M2

## 2017-05-16 DIAGNOSIS — I10 ESSENTIAL HYPERTENSION: Primary | ICD-10-CM

## 2017-05-16 DIAGNOSIS — E11.49 DIABETES MELLITUS TYPE 2 WITH NEUROLOGICAL MANIFESTATIONS (H): ICD-10-CM

## 2017-05-16 PROCEDURE — 99244 OFF/OP CNSLTJ NEW/EST MOD 40: CPT | Mod: ZP | Performed by: INTERNAL MEDICINE

## 2017-05-16 PROCEDURE — 99211 OFF/OP EST MAY X REQ PHY/QHP: CPT

## 2017-05-16 RX ORDER — GABAPENTIN 100 MG/1
300 CAPSULE ORAL AT BEDTIME
COMMUNITY
End: 2019-06-24

## 2017-05-16 RX ORDER — LISINOPRIL 20 MG/1
20 TABLET ORAL DAILY
Qty: 90 TABLET | Refills: 3 | Status: SHIPPED | OUTPATIENT
Start: 2017-05-16 | End: 2018-09-07

## 2017-05-16 NOTE — PROGRESS NOTES
May 16, 2017       Aden Lopez MD    [place] [address]      Dear Kirk:      I saw Crispin Rojas in initial vascular medical assessment at the [facility] on today's date.  You recall that he is a hypertensive, hyperlipidemic type 2 diabetic with a history of 2 amputations of toes in his bilateral feet in February in 2016.  The patient at that time had developed neuropathic ulcers which further led to osteomyelitis thus provoking the need for the amputations.  He had no difficulty healing his amputation site incisions.  Initially, he had retired but now he works 3 jobs, wherein he is very physically active.  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  He has to ambulate for significant distances in association with those 2 jobs and he never has any calf, thigh or buttock claudication.  He denies classic nocturnal ischemic rest pain symptoms.  You had sought my input to ascertain whether or not he needed any vascular imaging studies.      REVIEW OF SYSTEMS:  He has no complaints of chest pain, shortness of breath, nausea, vomiting or diarrhea.  The remainder of his 14-point review of systems is within normal limits with the exception of intermittent left-sided skull and external ear pain which is being evaluated by Dr. Albaro Rowland of the [facility].      PAST MEDICAL HISTORY:   1.  Type 2 diabetes.   2.  Hypertension.   3.  Hyperlipidemia.   4.  Obesity.   5.  Diabetic neuropathy.   6.  Hypertension.   7.  Gastroesophageal reflux disease.   8.  Stroke in  with no residual deficits.      PREVIOUS MEDICAL HISTORY:  Notable for the followin.  Gastrocnemius, I&D 2016.   2.  Right great toe amputation 2016.   3.  Left first and second toe amputation 2016.   4.  Cystoscopy with ureteral  stenting 08/21/2016.   5.  ESWL, renal stone 09/08/2016.   6.  Laser holmium lithotripsy and ureteral stent insertion 10/03/2016.      FAMILY HISTORY:  Notable for 4 family members who have lupus.  His mother is one of those.  She also has a history of stroke and an unknown malignancy.  His father, 2 sisters and his maternal grandfather are all diabetic.      SOCIAL HISTORY:  The patient is a lifelong nonsmoker.  He is .  He and his wife, Sydney, have 2 children.  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  He also works at a [employer].      MEDICATIONS PRIOR TO ADMISSION:   1.  Lantus up to 100 units daily.   2.  Lisinopril 10 mg daily.   3.  Amlodipine 5 mg daily.   4.  Cialis 5 mg daily p.r.n.   5.  NovoLog sliding scale and carbohydrate counting coverage.   6.  Ketoconazole 2% shampoo.   7.  Metformin 1 gram b.i.d.   8.  Lipitor 40 mg p.o. daily.   9.  Vitamin B12 500 mcg daily.   10.  Omega 3 fatty acids 2 grams daily.      ALLERGIES:  Niacin causes swelling, as does simvastatin.        PHYSICAL EXAMINATION:   VITAL SIGNS:  The patient's blood pressure is poorly controlled with a blood pressure of 146/74 in the right arm and 154/75 in the left arm, pulse is 51 and regular, respirations are 12, weight is 251 pounds.  Height is 5 feet 11 inches tall.  BMI is 35.   HEENT:  Oropharynx within normal limits.   NECK:  No JVD, thyromegaly, lymphadenopathy.  No carotid bruits.   LUNGS:  Clear to auscultation bilaterally without rales, wheezes or rhonchi.   HEART:  Regular rate and rhythm, normal S1, S2, no S3, S4, murmur, gallop or rub.   ABDOMEN:  Normoactive bowel sounds, obese, soft, nondistended, nontender.   EXTREMITIES:  Without cyanosis, clubbing or edema.  He has CEAP class C3 varicosities.  PERIPHERAL ARTERIAL EXAMINATION:  Reveals 3+ femoral, popliteal, DP and PT pulses.   DERMATOLOGIC:  Reveals no suspicious lumps or masses.      IMPRESSION:     1.  Not optimally  controlled type 2 diabetes.     2.  Poorly controlled hypertension.     3.  Hyperlipidemia at LDL goal of less than 70.     4.  History of bilateral toe amputations secondary to neuropathic rather than arterial ulcers.      DISCUSSION:      Firstly, thank you for sending this very pleasant gentleman to our office for vascular medical assessment.  Fortunately for him, his arterial examination is such that he is unlikely to have hemodynamically significant steno occlusive peripheral arterial disease.  His palpable peripheral pulses indicate very little benefit from even noninvasive arterial imaging.  As such, I suggested to the patient that he focus on modifying his cardiovascular disease risk factors.  As his hyperlipidemia is well controlled, I suggested that he focus upon management of his hypertension and his type 2 diabetes.  His A1c is elevated at 7.5%.  I added Januvia 100 mg daily to his regimen and advised that he split his Lantus dosing to aid in absorption.  I suggested that he have a hemoglobin A1c checked through your office in 3 months.  Regarding his blood pressure, I increased his lisinopril from 10 mg to 20 mg daily.  I advised that he see you for a blood pressure check sometime in the next month and that you consider titrating up on his medications should his blood pressure be greater than 130/80.        Please do not hesitate to contact me if I may be of assistance regarding this or other matters moving forward in the future.      Sincerely,         SUSANNE STEEN MD             D: 2017 09:56   T: 2017 10:25   MT: KAYLYNN      Name:     CEDRIC LAW   MRN:      [id number]        Account:      [id number]   :      1960           Visit Date:   2017      Document: [id number]       cc: Aden Lopez MD

## 2017-05-16 NOTE — NURSING NOTE
"Chief Complaint   Patient presents with     Consult     Diabetes consult referred by Dr. Lopez       Initial /75 (BP Location: Left arm, Patient Position: Chair, Cuff Size: Adult Large)  Pulse 52  Ht 5' 11\" (1.803 m)  Wt 251 lb (113.9 kg)  SpO2 97%  BMI 35.01 kg/m2 Estimated body mass index is 35.01 kg/(m^2) as calculated from the following:    Height as of this encounter: 5' 11\" (1.803 m).    Weight as of this encounter: 251 lb (113.9 kg).  Medication Reconciliation: complete    Face to face time: 7 minutes    Bee Nava CMA    "

## 2017-05-16 NOTE — MR AVS SNAPSHOT
"              After Visit Summary   5/16/2017    Crispin Rojas    MRN: [id number]           Patient Information     Date Of Birth          1960        Visit Information        Provider Department      5/16/2017 8:30 AM Erwin Mccarthy MD [facility]      Today's Diagnoses     Essential hypertension    -  1    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)           Follow-ups after your visit        Who to contact     If you have questions or need follow up information about today's clinic visit or your schedule please contact [facility] directly at [phone].  Normal or non-critical lab and imaging results will be communicated to you by MyChart, letter or phone within 4 business days after the clinic has received the results. If you do not hear from us within 7 days, please contact the clinic through Edgewarehart or phone. If you have a critical or abnormal lab result, we will notify you by phone as soon as possible.  Submit refill requests through Contrib or call your pharmacy and they will forward the refill request to us. Please allow 3 business days for your refill to be completed.          Additional Information About Your Visit        MyChart Information     Contrib gives you secure access to your electronic health record. If you see a primary care provider, you can also send messages to your care team and make appointments. If you have questions, please call your primary care clinic.  If you do not have a primary care provider, please call [phone] and they will assist you.        Care EveryWhere ID     This is your Care EveryWhere ID. This could be used by other organizations to access your [facility] medical records  [id number]        Your Vitals Were     Pulse Height Pulse Oximetry BMI (Body Mass Index)          52 5' 11\" (1.803 m) 97% 35.01 kg/m2         Blood Pressure from Last 3 " Encounters:   05/16/17 154/75   04/10/17 146/80   03/31/17 134/68    Weight from Last 3 Encounters:   05/16/17 251 lb (113.9 kg)   04/10/17 250 lb (113.4 kg)   03/31/17 250 lb 4.8 oz (113.5 kg)              Today, you had the following     No orders found for display         Today's Medication Changes          These changes are accurate as of: 5/16/17 11:35 AM.  If you have any questions, ask your nurse or doctor.               Start taking these medicines.        Dose/Directions    sitagliptin 100 MG tablet   Commonly known as:  JANUVIA   Used for:  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)   Started by:  Erwin Mccarthy MD        Dose:  100 mg   Take 1 tablet (100 mg) by mouth daily   Quantity:  90 tablet   Refills:  3         These medicines have changed or have updated prescriptions.        Dose/Directions    insulin glargine 100 UNIT/ML injection   Commonly known as:  LANTUS SOLOSTAR   This may have changed:  additional instructions   Used for:  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)   Changed by:  Erwin Mccarthy MD        Take up to 50 units BID   Quantity:  60 mL   Refills:  1       lisinopril 20 MG tablet   Commonly known as:  PRINIVIL/ZESTRIL   This may have changed:    - medication strength  - how much to take   Used for:  Essential hypertension   Changed by:  Erwin Mccarthy MD        Dose:  20 mg   Take 1 tablet (20 mg) by mouth daily   Quantity:  90 tablet   Refills:  3            Where to get your medicines      These medications were sent to [id number] - [place], [place] - [address] [facility] [address] [address]     Phone:  [phone]     insulin glargine 100 UNIT/ML injection    lisinopril 20 MG tablet    sitagliptin 100 MG tablet                Primary Care Provider Office Phone # Fax #    Aden Lopez [phone]       [facility]  [address] [address] [address]  [facility] [address]        Thank you!     Thank you for choosing [facility]  for your care. Our goal is always to provide you with excellent care. Hearing back from our patients is one way we can continue to improve our services. Please take a few minutes to complete the written survey that you may receive in the mail after your visit with us. Thank you!             Your Updated Medication List - Protect others around you: Learn how to safely use, store and throw away your medicines at www.disposemymeds.org.          This list is accurate as of: 5/16/17 11:35 AM.  Always use your most recent med list.                   Brand Name Dispense Instructions for use    amLODIPine 5 MG tablet    NORVASC    90 tablet    Take 1 tablet (5 mg) by mouth daily       atorvastatin 40 MG tablet    LIPITOR    30 tablet    Take 1 tablet (40 mg) by mouth daily       BD insulin syringe ultrafine 30G X 1/2\" 0.3 ML   Generic drug:  insulin syringe-needle U-100     100 each    1 Syringe daily. Use one syringe  daily or as directed.       blood glucose monitoring meter device kit    no brand specified    1 kit    Use to test blood sugar 3 times daily or as directed.       blood glucose monitoring test strip    no brand specified    300 strip    Use to test blood sugars 3 times daily or as directed       CIALIS 5 MG tablet   Generic drug:  tadalafil     30 tablet    TAKE 1 TABLET BY MOUTH EVERY DAY AS NEEDED       Co-Enzyme Q10 100 MG Caps      Take 100 mg by mouth daily       insulin aspart 100 UNIT/ML injection    NovoLOG FLEXPEN    30 mL    For mealtime carbohydrate and hgm coverage 10-35 units three times daily       insulin glargine 100 UNIT/ML injection    LANTUS SOLOSTAR    60 mL    Take up to 50 units BID       ketoconazole 2 % shampoo    NIZORAL    120 mL    Apply to the affected area and wash off after 5 minutes.       lisinopril 20 MG tablet    PRINIVIL/ZESTRIL    90 tablet    Take " 1 tablet (20 mg) by mouth daily       metFORMIN 500 MG 24 hr tablet    GLUCOPHAGE-XR    360 tablet    Take 2 tablets (1,000 mg) by mouth 2 times daily (with meals)       MULTIVITAMIN PO      Take 1 tablet by mouth daily       OMEGA-3 FISH OIL PO      Take 2 g by mouth daily       sitagliptin 100 MG tablet    JANUVIA    90 tablet    Take 1 tablet (100 mg) by mouth daily       thin lancets    NO BRAND SPECIFIED    300 each    1 each 3 times daily.       VITAMIN B 12 PO      Take 500 mcg by mouth daily       VITAMIN D3 PO      Take 1,000 Units by mouth daily

## 2017-05-31 DIAGNOSIS — E11.49 DIABETES MELLITUS TYPE 2 WITH NEUROLOGICAL MANIFESTATIONS (H): ICD-10-CM

## 2017-05-31 NOTE — TELEPHONE ENCOUNTER
metFORMIN (GLUCOPHAGE-XR) 500 MG 24 hr tablet   Sig: Take 2 tablets (1,000 mg) by mouth 2 times daily (with meals)            Last Written Prescription Date: 8/8/16  Last Fill Quantity: 360, # refills: 1  Last Office Visit with [facility], [facility] or [facility] prescribing provider:  3/31/2017        BP Readings from Last 3 Encounters:   05/16/17 154/75   04/10/17 146/80   03/31/17 134/68     Lab Results   Component Value Date    MICROL 5 12/05/2016     Lab Results   Component Value Date    UMALCR 9.87 12/05/2016     Creatinine   Date Value Ref Range Status   03/31/2017 0.89 0.66 - 1.25 mg/dL Final   ]  GFR Estimate   Date Value Ref Range Status   03/31/2017 88 >60 mL/min/1.7m2 Final     Comment:     Non  GFR Calc   12/05/2016 89 >60 mL/min/1.7m2 Final     Comment:     Non  GFR Calc   10/03/2016 >90  Non  GFR Calc   >60 mL/min/1.7m2 Final     GFR Estimate If Black   Date Value Ref Range Status   03/31/2017 >90   GFR Calc   >60 mL/min/1.7m2 Final   12/05/2016 >90   GFR Calc   >60 mL/min/1.7m2 Final   10/03/2016 >90   GFR Calc   >60 mL/min/1.7m2 Final     Lab Results   Component Value Date    CHOL 109 03/31/2017     Lab Results   Component Value Date    HDL 35 03/31/2017     Lab Results   Component Value Date    LDL 53 03/31/2017     Lab Results   Component Value Date    TRIG 88 03/31/2017     Lab Results   Component Value Date    CHOLHDLRATIO 4.1 07/31/2015     Lab Results   Component Value Date    AST 40 03/31/2017     Lab Results   Component Value Date    ALT 58 03/31/2017     Lab Results   Component Value Date    A1C 7.5 03/31/2017    A1C 7.3 12/05/2016    A1C 6.7 08/30/2016    A1C 6.4 08/17/2016    A1C 6.3 08/01/2016     Potassium   Date Value Ref Range Status   03/31/2017 4.4 3.4 - 5.3 mmol/L Final     TANYA Joaquin  May 31, 2017  12:11 PM

## 2017-06-02 RX ORDER — METFORMIN HCL 500 MG
TABLET, EXTENDED RELEASE 24 HR ORAL
Qty: 360 TABLET | Refills: 0 | Status: SHIPPED | OUTPATIENT
Start: 2017-06-02 | End: 2017-08-29

## 2017-06-02 NOTE — TELEPHONE ENCOUNTER
Routing refill request to provider for review/approval because:  Labs out of range:  BP  Javier Hendrickson RN, BSN

## 2017-06-19 DIAGNOSIS — E11.52 TYPE 2 DIABETES MELLITUS WITH DIABETIC PERIPHERAL ANGIOPATHY AND GANGRENE, WITH LONG-TERM CURRENT USE OF INSULIN (H): ICD-10-CM

## 2017-06-19 DIAGNOSIS — Z79.4 TYPE 2 DIABETES MELLITUS WITH DIABETIC PERIPHERAL ANGIOPATHY AND GANGRENE, WITH LONG-TERM CURRENT USE OF INSULIN (H): ICD-10-CM

## 2017-06-19 NOTE — TELEPHONE ENCOUNTER
Pending Prescriptions:                       Disp   Refills    NOVOLOG FLEXPEN 100 UNIT/ML soln [Pharmac*30 mL  0            Sig: INJECT 10 TO 35 UNITS UNDER THE SKIN THREE TIMES           DAILY                 Last Written Prescription Date: 3/13/2017  Last Fill Quantity: 30mL, # refills: 1  Last Office Visit with VINCENT, MALAIKA or [facility] prescribing provider:  3/31/2017Jessica        BP Readings from Last 3 Encounters:   05/16/17 154/75   04/10/17 146/80   03/31/17 134/68     Lab Results   Component Value Date    MICROL 5 12/05/2016     Lab Results   Component Value Date    UMALCR 9.87 12/05/2016     Creatinine   Date Value Ref Range Status   03/31/2017 0.89 0.66 - 1.25 mg/dL Final   ]  GFR Estimate   Date Value Ref Range Status   03/31/2017 88 >60 mL/min/1.7m2 Final     Comment:     Non  GFR Calc   12/05/2016 89 >60 mL/min/1.7m2 Final     Comment:     Non  GFR Calc   10/03/2016 >90  Non  GFR Calc   >60 mL/min/1.7m2 Final     GFR Estimate If Black   Date Value Ref Range Status   03/31/2017 >90   GFR Calc   >60 mL/min/1.7m2 Final   12/05/2016 >90   GFR Calc   >60 mL/min/1.7m2 Final   10/03/2016 >90   GFR Calc   >60 mL/min/1.7m2 Final     Lab Results   Component Value Date    CHOL 109 03/31/2017     Lab Results   Component Value Date    HDL 35 03/31/2017     Lab Results   Component Value Date    LDL 53 03/31/2017     Lab Results   Component Value Date    TRIG 88 03/31/2017     Lab Results   Component Value Date    CHOLHDLRATIO 4.1 07/31/2015     Lab Results   Component Value Date    AST 40 03/31/2017     Lab Results   Component Value Date    ALT 58 03/31/2017     Lab Results   Component Value Date    A1C 7.5 03/31/2017    A1C 7.3 12/05/2016    A1C 6.7 08/30/2016    A1C 6.4 08/17/2016    A1C 6.3 08/01/2016     Potassium   Date Value Ref Range Status   03/31/2017 4.4 3.4 - 5.3 mmol/L Final

## 2017-06-20 RX ORDER — INSULIN ASPART 100 [IU]/ML
INJECTION, SOLUTION INTRAVENOUS; SUBCUTANEOUS
Qty: 30 ML | Refills: 1 | Status: SHIPPED | OUTPATIENT
Start: 2017-06-20 | End: 2017-10-20

## 2017-08-10 ENCOUNTER — TRANSFERRED RECORDS (OUTPATIENT)
Dept: HEALTH INFORMATION MANAGEMENT | Facility: CLINIC | Age: 57
End: 2017-08-10

## 2017-08-29 DIAGNOSIS — E11.49 DIABETES MELLITUS TYPE 2 WITH NEUROLOGICAL MANIFESTATIONS (H): ICD-10-CM

## 2017-08-29 NOTE — TELEPHONE ENCOUNTER
Pending Prescriptions:                       Disp   Refills    metFORMIN (GLUCOPHAGE-XR) 500 MG 24 hr ta*360 ta*0            Sig: TAKE 2 TABLETS BY MOUTH TWICE DAILY WITH MEALS                 Last Written Prescription Date: 6/2/2017  Last Fill Quantity: 360, # refills: 0  Last Office Visit with [facility], [facility] or [facility] prescribing provider:  3/31/2017Jessica          BP Readings from Last 3 Encounters:   05/16/17 154/75   04/10/17 146/80   03/31/17 134/68     Lab Results   Component Value Date    MICROL 5 12/05/2016     Lab Results   Component Value Date    UMALCR 9.87 12/05/2016     Creatinine   Date Value Ref Range Status   03/31/2017 0.89 0.66 - 1.25 mg/dL Final   ]  GFR Estimate   Date Value Ref Range Status   03/31/2017 88 >60 mL/min/1.7m2 Final     Comment:     Non  GFR Calc   12/05/2016 89 >60 mL/min/1.7m2 Final     Comment:     Non  GFR Calc   10/03/2016 >90  Non  GFR Calc   >60 mL/min/1.7m2 Final     GFR Estimate If Black   Date Value Ref Range Status   03/31/2017 >90   GFR Calc   >60 mL/min/1.7m2 Final   12/05/2016 >90   GFR Calc   >60 mL/min/1.7m2 Final   10/03/2016 >90   GFR Calc   >60 mL/min/1.7m2 Final     Lab Results   Component Value Date    CHOL 109 03/31/2017     Lab Results   Component Value Date    HDL 35 03/31/2017     Lab Results   Component Value Date    LDL 53 03/31/2017     Lab Results   Component Value Date    TRIG 88 03/31/2017     Lab Results   Component Value Date    CHOLHDLRATIO 4.1 07/31/2015     Lab Results   Component Value Date    AST 40 03/31/2017     Lab Results   Component Value Date    ALT 58 03/31/2017     Lab Results   Component Value Date    A1C 7.5 03/31/2017    A1C 7.3 12/05/2016    A1C 6.7 08/30/2016    A1C 6.4 08/17/2016    A1C 6.3 08/01/2016     Potassium   Date Value Ref Range Status   03/31/2017 4.4 3.4 - 5.3 mmol/L Final

## 2017-08-30 ENCOUNTER — OFFICE VISIT (OUTPATIENT)
Dept: PODIATRY | Facility: CLINIC | Age: 57
End: 2017-08-30
Payer: COMMERCIAL

## 2017-08-30 VITALS — BODY MASS INDEX: 35 KG/M2 | HEART RATE: 64 BPM | WEIGHT: 250 LBS | HEIGHT: 71 IN | RESPIRATION RATE: 18 BRPM

## 2017-08-30 DIAGNOSIS — L97.412 ULCER OF HEEL AND MIDFOOT, RIGHT, WITH FAT LAYER EXPOSED (H): ICD-10-CM

## 2017-08-30 DIAGNOSIS — E11.42 TYPE 2 DIABETES MELLITUS WITH PERIPHERAL NEUROPATHY (H): Primary | ICD-10-CM

## 2017-08-30 PROCEDURE — 99213 OFFICE O/P EST LOW 20 MIN: CPT | Mod: 25 | Performed by: PODIATRIST

## 2017-08-30 PROCEDURE — 11042 DBRDMT SUBQ TIS 1ST 20SQCM/<: CPT | Performed by: PODIATRIST

## 2017-08-30 NOTE — LETTER
"    8/30/2017         RE: Crispin Rojas  [address] [facility] [address]        Dear Colleague,    Thank you for referring your patient, Crispin Rojas, to the [facility]. Please see a copy of my visit note below.    ASSESSMENT/PLAN:    Encounter Diagnoses   Name Primary?     Type 2 diabetes mellitus with peripheral neuropathy (H) Yes     Ulcer of heel and midfoot, right, with fat layer exposed (H)      No clinical signs of infection.    I explained that the ulceration likely started prior to the blister, that it was covered by the callus.  I discussed the risk of infection, hospitalization and surgery.      We reviewed the components to treatment:  Debridement, offloading, daily wound cares    He has a CAM walker at home.  He will use it.  I told him that if the wound is not 50% healed by his next visit, he might need to be non weight bearing.     Wound Care Recommendations:    1)  Keep the wound covered by a bandage when bathing.    2)  Gently clean the wound with soap water, separate from bath/shower water.      3)  Each day, apply a topical antibiotic ointment to the wound (Neosporin, Triple antibiotic, Bacitracin).   Cover with large band-aid or gauze.      5)  Please seek immediate medical attention if any increasing redness, drainage, smell, or pain related to the wound.     6)  Please return to clinic in the period of time requested by Dr. Molina.        Excisional debridment procedure discussed.  Goals of removing non-viable tissue, evaluating full extent of wound, and promoting wound healing were explained.  Pt provided verbal and written consent.  A \"Time Out\" was called.     Using a sterile #15 blade and tissue nippers, excisional debridment of the right foot ulcer was performed, full-thickness to the level of, and including, the subcutaneous tissue.  The hyperkeratotic eschar was removed, by excising skin edges back to healthy, bleeding tissue .  Other non-viable tissue was " excised. The ulcer base was scraped to remove bioburden and promote healing.  There was moderate bleeding with the procedure. No anesthesia needed due to peripheral neuropathy.  Sterile dressing applied.        Weight management plan: Patient was referred to their PCP to discuss a diet and exercise plan.      Valentino Molina DPM, FACFAS, MS    [place] Department of Podiatry/Foot & Ankle Surgery      ____________________________________________________________________    HPI:         Chief Complaint: right foot blister is not healing  Onset of problem: 2.5 weeks  Non pain.   He has type 2 DM with peripheral neuropathy.  History of partial right hallux amputation.  He reports good control of his diabetes.  Last Hgb A1C = 7.5%.  He did not tolerate the custom orthoses that I prescribed in April.  He said that they causes his knees to hurt.     *  Past Medical History:   Diagnosis Date     Cerebral infarction (H)      Chronic infection      H/O heartburn      History of heartburn      Hypertension      Numbness and tingling      Obesity      Type II or unspecified type diabetes mellitus without mention of complication, not stated as uncontrolled    *  *  Past Surgical History:   Procedure Laterality Date     AMPUTATE FOOT Left 8/18/2016    Procedure: AMPUTATE FOOT;  Surgeon: Jack Younger DPM;  Location: [facility] OR     AMPUTATE TOE(S) Right 2/1/2016    Procedure: AMPUTATE TOE(S);  Surgeon: Rachelle Manriquez DPM, Pod;  Location: [facility] OR     ANGIOGRAM       COLONOSCOPY       COMBINED CYSTOSCOPY, RETROGRADES, URETEROSCOPY, INSERT STENT Left 8/21/2016    Procedure: COMBINED CYSTOSCOPY, RETROGRADES, URETEROSCOPY, INSERT STENT;  Surgeon: Artemio Valenzuela MD;  Location: [facility] OR     COMBINED CYSTOSCOPY, RETROGRADES, URETEROSCOPY, LASER HOLMIUM LITHOTRIPSY URETER(S), INSERT STENT Left 10/3/2016    Procedure: COMBINED CYSTOSCOPY, RETROGRADES, URETEROSCOPY, LASER HOLMIUM LITHOTRIPSY URETER(S), INSERT STENT;  Surgeon: Nicolas  Artemio Arce MD;  Location: [facility] OR     EXTRACORPOREAL SHOCK WAVE LITHOTRIPSY (ESWL) Left 9/8/2016    Procedure: EXTRACORPOREAL SHOCK WAVE LITHOTRIPSY (ESWL);  Surgeon: Artemio Valenzuela MD;  Location: [facility] OR     RECESSION GASTROCNEMIUS Right 2/1/2016    Procedure: RECESSION GASTROCNEMIUS;  Surgeon: Rachelle Manriquez, DPM, Pod;  Location: [facility] OR   *  *  Current Outpatient Prescriptions   Medication Sig Dispense Refill     NOVOLOG FLEXPEN 100 UNIT/ML soln INJECT 10 TO 35 UNITS UNDER THE SKIN THREE TIMES DAILY 30 mL 1     metFORMIN (GLUCOPHAGE-XR) 500 MG 24 hr tablet TAKE 2 TABLETS BY MOUTH TWICE DAILY WITH MEALS 360 tablet 0     insulin glargine (LANTUS SOLOSTAR) 100 UNIT/ML injection Take up to 50 units BID 60 mL 1     sitagliptin (JANUVIA) 100 MG tablet Take 1 tablet (100 mg) by mouth daily 90 tablet 3     lisinopril (PRINIVIL/ZESTRIL) 20 MG tablet Take 1 tablet (20 mg) by mouth daily 90 tablet 3     gabapentin (NEURONTIN) 100 MG capsule Take 300 mg by mouth At Bedtime       amLODIPine (NORVASC) 5 MG tablet Take 1 tablet (5 mg) by mouth daily 90 tablet 1     CIALIS 5 MG tablet TAKE 1 TABLET BY MOUTH EVERY DAY AS NEEDED 30 tablet 0     blood glucose monitoring (NO BRAND SPECIFIED) meter device kit Use to test blood sugar 3 times daily or as directed. 1 kit 0     blood glucose monitoring (NO BRAND SPECIFIED) test strip Use to test blood sugars 3 times daily or as directed 300 strip 3     ketoconazole (NIZORAL) 2 % shampoo Apply to the affected area and wash off after 5 minutes. 120 mL 11     atorvastatin (LIPITOR) 40 MG tablet Take 1 tablet (40 mg) by mouth daily 30 tablet 11     Co-Enzyme Q10 100 MG CAPS Take 100 mg by mouth daily        Cyanocobalamin (VITAMIN B 12 PO) Take 500 mcg by mouth daily        Cholecalciferol (VITAMIN D3 PO) Take 1,000 Units by mouth daily        Multiple Vitamins-Minerals (MULTIVITAMIN PO) Take 1 tablet by mouth daily        Omega-3 Fatty Acids (OMEGA-3 FISH OIL PO) Take 2 g by mouth  "daily        lancets thin MISC 1 each 3 times daily. 300 each 3     Insulin Syringe-Needle U-100 (BD INSULIN SYRINGE ULTRAFINE) 30G X 1/2\" 0.3 ML MISC 1 Syringe daily. Use one syringe  daily or as directed. 100 each prn       ROS:    A 10-point review of systems was performed.  It is positive for that noted in the HPI and as seen below.  All other systems found to be negative.     Numbness in feet?  yes   Calf pain with walking? no  Recent foot/ankle injury? no  Weight change  over past 12 months? no  Self perception as overweight? no  Recent flu-like symptoms? no  Joint pain other than feet ? no    EXAM:    Vitals: Pulse 64  Resp 18  Ht 5' 11\" (1.803 m)  Wt 250 lb (113.4 kg)  BMI 34.87 kg/m2  BMI: Body mass index is 34.87 kg/(m^2).  Height: 5' 11\"    Constitutional/ general:  Pt is in no apparent distress, appears well-nourished.  Cooperative with history and physical exam.     Vascular:  Pedal pulses are palpable bilaterally for both the DP and PT arteries.  CFT < 3 sec.  No edema.  Pedal hair growth noted.     Neuro:  Alert and oriented x 3. Coordinated gait.  Light touch sensation is diminished to the L4, L5, S1 distributions.  No evidence of neurological-based weakness, spasticity, or contracture in the lower extremities.     Derm: Full thickness ulceration to the level of the subcutaneous fat, sub right 1st metatarsal head.  Base is granular.  Prolific hyperkeratotic eschar around the edges.  No purulence. The wound does not probe to bone.  1.2cm diameter, round, x 0.2cm deep.     Musculoskeletal:    Lower extremity muscle strength is normal.  Patient is ambulatory without an assistive device or brace .  No gross deformities.      Valentino Molina DPM, FACFAS, MS    [place] Department of Podiatry/Foot & Ankle Surgery              Again, thank you for allowing me to participate in the care of your patient.        Sincerely,        Valentino Molina DPM    "

## 2017-08-30 NOTE — PATIENT INSTRUCTIONS
DR. CANDELARIA'S CLINIC LOCATIONS     MONDAY / FRIDAY - [facility] WEDNESDAY - [facility]   [address] [place] [address] [address] [address]   [phone] / [phone] / [phone]       THURSDAY - [place] SCHEDULE SURGERY: [phone]   [address] APPOINTMENTS: [phone]   [place] [address] AFTER HOURS: [phone] / [phone] BILLING QUESTIONS: [phone]      Wound Care Recommendations:    1)  Keep the wound covered by a bandage when bathing.    2)  Gently clean the wound with soap water, separate from bath/shower water.      3)  Each day, apply a topical antibiotic ointment to the wound (Neosporin, Triple antibiotic, Bacitracin).   Cover with large band-aid or gauze.      5)  Please seek immediate medical attention if any increasing redness, drainage, smell, or pain related to the wound.     6)  Please return to clinic in the period of time requested by Dr. Candelaria.            Wear the CAM boot is able.    Body Mass Index (BMI)  Many things can cause foot and ankle problems. Foot structure, activity level, foot mechanics and injuries are common causes of pain.  One very important issue that often goes unmentioned, is body weight.  Extra weight can cause increased stress on muscles, ligaments, bones and tendons.  Sometimes just a few extra pounds is all it takes to put one over her/his threshold. Without reducing that stress, it can be difficult to alleviate pain. Some people are uncomfortable addressing this issue, but we feel it is important for you to think about it. As Foot &  Ankle specialists, our job is addressing the lower extremity problem and possible causes. Regarding extra body weight, we encourage patients to discuss diet and weight management plans with their primary care doctors. It is this team approach that gives you the best opportunity for pain relief and getting you back on your feet.

## 2017-08-30 NOTE — MR AVS SNAPSHOT
After Visit Summary   8/30/2017    Crispin Rojas    MRN: [id number]           Patient Information     Date Of Birth          1960        Visit Information        Provider Department      8/30/2017 7:00 AM Valentino Candelaria DPM [facility]        Care Instructions    DR. CANDELARIA'S CLINIC LOCATIONS     MONDAY / FRIDAY - [facility] WEDNESDAY - [facility]   [address] [place] [address] [place] [address]   [phone] / [phone] / [phone]       THURSDAY - [place] SCHEDULE SURGERY: [phone]   [address] APPOINTMENTS: [phone]   [place] [address] AFTER HOURS: [phone]   [phone] / [phone] BILLING QUESTIONS: [phone]      Wound Care Recommendations:    1)  Keep the wound covered by a bandage when bathing.    2)  Gently clean the wound with soap water, separate from bath/shower water.      3)  Each day, apply a topical antibiotic ointment to the wound (Neosporin, Triple antibiotic, Bacitracin).   Cover with large band-aid or gauze.      5)  Please seek immediate medical attention if any increasing redness, drainage, smell, or pain related to the wound.     6)  Please return to clinic in the period of time requested by Dr. Candelaria.            Wear the CAM boot is able.    Body Mass Index (BMI)  Many things can cause foot and ankle problems. Foot structure, activity level, foot mechanics and injuries are common causes of pain.  One very important issue that often goes unmentioned, is body weight.  Extra weight can cause increased stress on muscles, ligaments, bones and tendons.  Sometimes just a few extra pounds is all it takes to put one over her/his threshold. Without reducing that stress, it can be difficult to alleviate pain. Some people are uncomfortable addressing this issue, but we feel it is important for you to think about it. As Foot &  Ankle specialists, our job is addressing the lower  "extremity problem and possible causes. Regarding extra body weight, we encourage patients to discuss diet and weight management plans with their primary care doctors. It is this team approach that gives you the best opportunity for pain relief and getting you back on your feet.                Follow-ups after your visit        Who to contact     If you have questions or need follow up information about today's clinic visit or your schedule please contact [facility] directly at [phone].  Normal or non-critical lab and imaging results will be communicated to you by [employer], letter or phone within 4 business days after the clinic has received the results. If you do not hear from us within 7 days, please contact the clinic through [employer] or phone. If you have a critical or abnormal lab result, we will notify you by phone as soon as possible.  Submit refill requests through [employer] or call your pharmacy and they will forward the refill request to us. Please allow 3 business days for your refill to be completed.          Additional Information About Your Visit        [employer] Information     [employer] gives you secure access to your electronic health record. If you see a primary care provider, you can also send messages to your care team and make appointments. If you have questions, please call your primary care clinic.  If you do not have a primary care provider, please call [phone] and they will assist you.        Care EveryWhere ID     This is your Care EveryWhere ID. This could be used by other organizations to access your [place] medical records  [id number]        Your Vitals Were     Pulse Respirations Height BMI (Body Mass Index)          64 18 5' 11\" (1.803 m) 34.87 kg/m2         Blood Pressure from Last 3 Encounters:   05/16/17 154/75   04/10/17 146/80   03/31/17 134/68    Weight from Last 3 Encounters:   08/30/17 250 lb (113.4 kg)   05/16/17 251 lb (113.9 kg)   04/10/17 250 lb (113.4 kg) " "             Today, you had the following     No orders found for display       Primary Care Provider Office Phone # Fax #    Aden Ethan Lopez [phone] [address] [facility] [address]        Equal Access to Services     RONALDYE TANYA : Hadii geraldine ku hadbenjamino Soomaali, waaxda luqadaha, qaybta kaalmada adeegyada, blanca johnsonn sabina killian lapeterotilio . So [facility] [phone].    ATENCIÓN: Si habla español, tiene a gomez disposición servicios gratuitos de asistencia lingüística. Llame al [phone].    We comply with applicable federal civil rights laws and [place] laws. We do not discriminate on the basis of race, color, national origin, age, disability sex, sexual orientation or gender identity.            Thank you!     Thank you for choosing [facility]  for your care. Our goal is always to provide you with excellent care. Hearing back from our patients is one way we can continue to improve our services. Please take a few minutes to complete the written survey that you may receive in the mail after your visit with us. Thank you!             Your Updated Medication List - Protect others around you: Learn how to safely use, store and throw away your medicines at www.disposemymeds.org.          This list is accurate as of: 8/30/17  7:21 AM.  Always use your most recent med list.                   Brand Name Dispense Instructions for use Diagnosis    amLODIPine 5 MG tablet    NORVASC    90 tablet    Take 1 tablet (5 mg) by mouth daily    Other specified transient cerebral ischemias       atorvastatin 40 MG tablet    LIPITOR    30 tablet    Take 1 tablet (40 mg) by mouth daily    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), Hyperlipidemia LDL goal <100       BD insulin syringe ultrafine 30G X 1/2\" 0.3 ML   Generic drug:  insulin syringe-needle U-100     100 each    1 Syringe daily. Use one syringe  daily or as directed.    Type 2 diabetes, HbA1C goal < " 8% (H)       blood glucose monitoring meter device kit    no brand specified    1 kit    Use to test blood sugar 3 times daily or as directed.    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)       CIALIS 5 MG tablet   Generic drug:  tadalafil     30 tablet    TAKE 1 TABLET BY MOUTH EVERY DAY AS NEEDED    Erectile dysfunction due to diseases classified elsewhere       Co-Enzyme Q10 100 MG Caps      Take 100 mg by mouth daily        gabapentin 100 MG capsule    NEURONTIN     Take 300 mg by mouth At Bedtime        insulin glargine 100 UNIT/ML injection    LANTUS SOLOSTAR    60 mL    Take up to 50 units BID    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)       ketoconazole 2 % shampoo    NIZORAL    120 mL    Apply to the affected area and wash off after 5 minutes.    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)       MULTIVITAMIN PO      Take 1 tablet by mouth daily        NovoLOG FLEXPEN 100 UNIT/ML injection   Generic drug:  insulin aspart     30 mL    INJECT 10 TO 35 UNITS UNDER THE SKIN THREE TIMES DAILY    Type 2 diabetes mellitus with diabetic peripheral angiopathy and gangrene, with long-term current use of insulin (H)       OMEGA-3 FISH OIL PO      Take 2 g by mouth daily        sitagliptin 100 MG tablet    JANUVIA    90 tablet    Take 1 tablet (100 mg) by mouth daily    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)       thin lancets    NO BRAND SPECIFIED    300 each    1 each 3 times  daily.    Type 2 diabetes, HbA1C goal < 8% (H)       VITAMIN B 12 PO      Take 500 mcg by mouth daily        VITAMIN D3 PO      Take 1,000 Units by mouth daily

## 2017-08-30 NOTE — NURSING NOTE
"Chief Complaint   Patient presents with     Foot Problems     R ball of foot ulcer x 2.5 week, putting iodine on it until 5 days ago for tx       Initial Pulse 64  Resp 18  Ht 5' 11\" (1.803 m)  Wt 250 lb (113.4 kg)  BMI 34.87 kg/m2 Estimated body mass index is 34.87 kg/(m^2) as calculated from the following:    Height as of this encounter: 5' 11\" (1.803 m).    Weight as of this encounter: 250 lb (113.4 kg).  Medication Reconciliation: complete  "

## 2017-08-30 NOTE — PROGRESS NOTES
"ASSESSMENT/PLAN:    Encounter Diagnoses   Name Primary?     Type 2 diabetes mellitus with peripheral neuropathy (H) Yes     Ulcer of heel and midfoot, right, with fat layer exposed (H)      No clinical signs of infection.    I explained that the ulceration likely started prior to the blister, that it was covered by the callus.  I discussed the risk of infection, hospitalization and surgery.      We reviewed the components to treatment:  Debridement, offloading, daily wound cares    He has a CAM walker at home.  He will use it.  I told him that if the wound is not 50% healed by his next visit, he might need to be non weight bearing.     Wound Care Recommendations:    1)  Keep the wound covered by a bandage when bathing.    2)  Gently clean the wound with soap water, separate from bath/shower water.      3)  Each day, apply a topical antibiotic ointment to the wound (Neosporin, Triple antibiotic, Bacitracin).   Cover with large band-aid or gauze.      5)  Please seek immediate medical attention if any increasing redness, drainage, smell, or pain related to the wound.     6)  Please return to clinic in the period of time requested by Dr. Molina.        Excisional debridment procedure discussed.  Goals of removing non-viable tissue, evaluating full extent of wound, and promoting wound healing were explained.  Pt provided verbal and written consent.  A \"Time Out\" was called.     Using a sterile #15 blade and tissue nippers, excisional debridment of the right foot ulcer was performed, full-thickness to the level of, and including, the subcutaneous tissue.  The hyperkeratotic eschar was removed, by excising skin edges back to healthy, bleeding tissue .  Other non-viable tissue was excised. The ulcer base was scraped to remove bioburden and promote healing.  There was moderate bleeding with the procedure. No anesthesia needed due to peripheral neuropathy.  Sterile dressing applied.        Weight management plan: Patient was " referred to their PCP to discuss a diet and exercise plan.      Valentino Molina DPM, FACFAS, MS    [facility]le [facility]      ____________________________________________________________________    HPI:         Chief Complaint: right foot blister is not healing  Onset of problem: 2.5 weeks  Non pain.   He has type 2 DM with peripheral neuropathy.  History of partial right hallux amputation.  He reports good control of his diabetes.  Last Hgb A1C = 7.5%.  He did not tolerate the custom orthoses that I prescribed in April.  He said that they causes his knees to hurt.     *  Past Medical History:   Diagnosis Date     Cerebral infarction (H)      Chronic infection      H/O heartburn      History of heartburn      Hypertension      Numbness and tingling      Obesity      Type II or unspecified type diabetes mellitus without mention of complication, not stated as uncontrolled    *  *  Past Surgical History:   Procedure Laterality Date     AMPUTATE FOOT Left 8/18/2016    Procedure: AMPUTATE FOOT;  Surgeon: Jack Younger DPM;  Location: [facility] OR     AMPUTATE TOE(S) Right 2/1/2016    Procedure: AMPUTATE TOE(S);  Surgeon: Rachelle Manriquez DPM, Pod;  Location: [facility] OR     ANGIOGRAM       COLONOSCOPY       COMBINED CYSTOSCOPY, RETROGRADES, URETEROSCOPY, INSERT STENT Left 8/21/2016    Procedure: COMBINED CYSTOSCOPY, RETROGRADES, URETEROSCOPY, INSERT STENT;  Surgeon: Artemio Valenzuela MD;  Location: [facility] OR     COMBINED CYSTOSCOPY, RETROGRADES, URETEROSCOPY, LASER HOLMIUM LITHOTRIPSY URETER(S), INSERT STENT Left 10/3/2016    Procedure: COMBINED CYSTOSCOPY, RETROGRADES, URETEROSCOPY, LASER HOLMIUM LITHOTRIPSY URETER(S), INSERT STENT;  Surgeon: Artemio Valenzuela MD;  Location: [facility] OR     EXTRACORPOREAL SHOCK WAVE LITHOTRIPSY (ESWL) Left 9/8/2016    Procedure: EXTRACORPOREAL SHOCK WAVE LITHOTRIPSY (ESWL);  Surgeon: Artemio Valenzuela MD;  Location: [facility]     RECESSION GASTROCNEMIUS Right  "2/1/2016    Procedure: RECESSION GASTROCNEMIUS;  Surgeon: Rachelle Manriquez, DPM, Pod;  Location: [facility] OR   *  *  Current Outpatient Prescriptions   Medication Sig Dispense Refill     NOVOLOG FLEXPEN 100 UNIT/ML soln INJECT 10 TO 35 UNITS UNDER THE SKIN THREE TIMES DAILY 30 mL 1     metFORMIN (GLUCOPHAGE-XR) 500 MG 24 hr tablet TAKE 2 TABLETS BY MOUTH TWICE DAILY WITH MEALS 360 tablet 0     insulin glargine (LANTUS SOLOSTAR) 100 UNIT/ML injection Take up to 50 units BID 60 mL 1     sitagliptin (JANUVIA) 100 MG tablet Take 1 tablet (100 mg) by mouth daily 90 tablet 3     lisinopril (PRINIVIL/ZESTRIL) 20 MG tablet Take 1 tablet (20 mg) by mouth daily 90 tablet 3     gabapentin (NEURONTIN) 100 MG capsule Take 300 mg by mouth At Bedtime       amLODIPine (NORVASC) 5 MG tablet Take 1 tablet (5 mg) by mouth daily 90 tablet 1     CIALIS 5 MG tablet TAKE 1 TABLET BY MOUTH EVERY DAY AS NEEDED 30 tablet 0     blood glucose monitoring (NO BRAND SPECIFIED) meter device kit Use to test blood sugar 3 times daily or as directed. 1 kit 0     blood glucose monitoring (NO BRAND SPECIFIED) test strip Use to test blood sugars 3 times daily or as directed 300 strip 3     ketoconazole (NIZORAL) 2 % shampoo Apply to the affected area and wash off after 5 minutes. 120 mL 11     atorvastatin (LIPITOR) 40 MG tablet Take 1 tablet (40 mg) by mouth daily 30 tablet 11     Co-Enzyme Q10 100 MG CAPS Take 100 mg by mouth daily        Cyanocobalamin (VITAMIN B 12 PO) Take 500 mcg by mouth daily        Cholecalciferol (VITAMIN D3 PO) Take 1,000 Units by mouth daily        Multiple Vitamins-Minerals (MULTIVITAMIN PO) Take 1 tablet by mouth daily        Omega-3 Fatty Acids (OMEGA-3 FISH OIL PO) Take 2 g by mouth daily        lancets thin MISC 1 each 3 times daily. 300 each 3     Insulin Syringe-Needle U-100 (BD INSULIN SYRINGE ULTRAFINE) 30G X 1/2\" 0.3 ML MISC 1 Syringe daily. Use one syringe  daily or as directed. 100 each prn       ROS:    A 10-point " "review of systems was performed.  It is positive for that noted in the HPI and as seen below.  All other systems found to be negative.     Numbness in feet?  yes   Calf pain with walking? no  Recent foot/ankle injury? no  Weight change  over past 12 months? no  Self perception as overweight? no  Recent flu-like symptoms? no  Joint pain other than feet ? no    EXAM:    Vitals: Pulse 64  Resp 18  Ht 5' 11\" (1.803 m)  Wt 250 lb (113.4 kg)  BMI 34.87 kg/m2  BMI: Body mass index is 34.87 kg/(m^2).  Height: 5' 11\"    Constitutional/ general:  Pt is in no apparent distress, appears well-nourished.  Cooperative with history and physical exam.     Vascular:  Pedal pulses are palpable bilaterally for both the DP and PT arteries.  CFT < 3 sec.  No edema.  Pedal hair growth noted.     Neuro:  Alert and oriented x 3. Coordinated gait.  Light touch sensation is diminished to the L4, L5, S1 distributions.  No evidence of neurological-based weakness, spasticity, or contracture in the lower extremities.     Derm: Full thickness ulceration to the level of the subcutaneous fat, sub right 1st metatarsal head.  Base is granular.  Prolific hyperkeratotic eschar around the edges.  No purulence. The wound does not probe to bone.  1.2cm diameter, round, x 0.2cm deep.     Musculoskeletal:    Lower extremity muscle strength is normal.  Patient is ambulatory without an assistive device or brace .  No gross deformities.      Valentino Molina DPM, CLYDE, MS    [place] [facility] Pod[facility] & Ankle Surgery            "

## 2017-08-31 DIAGNOSIS — E78.5 HYPERLIPIDEMIA LDL GOAL <100: ICD-10-CM

## 2017-08-31 DIAGNOSIS — E11.49 DIABETES MELLITUS TYPE 2 WITH NEUROLOGICAL MANIFESTATIONS (H): ICD-10-CM

## 2017-08-31 RX ORDER — METFORMIN HCL 500 MG
TABLET, EXTENDED RELEASE 24 HR ORAL
Qty: 360 TABLET | Refills: 0 | Status: SHIPPED | OUTPATIENT
Start: 2017-08-31 | End: 2017-12-10

## 2017-08-31 RX ORDER — ATORVASTATIN CALCIUM 40 MG/1
40 TABLET, FILM COATED ORAL DAILY
Qty: 30 TABLET | Refills: 1 | Status: SHIPPED | OUTPATIENT
Start: 2017-08-31 | End: 2017-10-20

## 2017-08-31 NOTE — TELEPHONE ENCOUNTER
Patient calling and upset refill not sent yet.  States sent 3 days ago.  This request 1st came today.  Refill sent.  Michaela Ahumada RN

## 2017-08-31 NOTE — TELEPHONE ENCOUNTER
Atorvastatin 40 MG Tablets         Last Written Prescription Date: 07/26/2016  Last Fill Quantity: 07/11/2017, #30 refills: 11    Last Office Visit with [facility] or [facility] prescribing provider:  03- Dr. Lopez.   Future Office Visit:    Next 5 appointments (look out 90 days)     Oct 04, 2017  7:00 AM CDT   [facility]    [address] [place] [id number] [phone]                  BP Readings from Last 3 Encounters:   05/16/17 154/75   04/10/17 146/80   03/31/17 134/68     Lab Results   Component Value Date    ALT 58 03/31/2017     Lab Results   Component Value Date    CHOL 109 03/31/2017     Lab Results   Component Value Date    HDL 35 03/31/2017     Lab Results   Component Value Date    LDL 53 03/31/2017     Lab Results   Component Value Date    TRIG 88 03/31/2017     Lab Results   Component Value Date    CHOLHDLRATIO 4.1 07/31/2015

## 2017-08-31 NOTE — TELEPHONE ENCOUNTER
Patient calling and upset refills not sent yet.  One refill sent.  Visit due 10/2/17.  Michaela Ahumada RN

## 2017-10-09 ENCOUNTER — OFFICE VISIT (OUTPATIENT)
Dept: PODIATRY | Facility: CLINIC | Age: 57
End: 2017-10-09
Payer: COMMERCIAL

## 2017-10-09 VITALS
WEIGHT: 250 LBS | BODY MASS INDEX: 35 KG/M2 | DIASTOLIC BLOOD PRESSURE: 62 MMHG | HEIGHT: 71 IN | SYSTOLIC BLOOD PRESSURE: 114 MMHG

## 2017-10-09 DIAGNOSIS — E11.42 TYPE 2 DIABETES MELLITUS WITH PERIPHERAL NEUROPATHY (H): Primary | ICD-10-CM

## 2017-10-09 DIAGNOSIS — L97.512 DIABETIC ULCER OF TOE OF RIGHT FOOT ASSOCIATED WITH TYPE 2 DIABETES MELLITUS, WITH FAT LAYER EXPOSED (H): ICD-10-CM

## 2017-10-09 DIAGNOSIS — L97.512 SKIN ULCER OF RIGHT FOOT WITH FAT LAYER EXPOSED (H): ICD-10-CM

## 2017-10-09 DIAGNOSIS — E11.621 DIABETIC ULCER OF TOE OF RIGHT FOOT ASSOCIATED WITH TYPE 2 DIABETES MELLITUS, WITH FAT LAYER EXPOSED (H): ICD-10-CM

## 2017-10-09 PROCEDURE — 11042 DBRDMT SUBQ TIS 1ST 20SQCM/<: CPT | Performed by: PODIATRIST

## 2017-10-09 PROCEDURE — 99214 OFFICE O/P EST MOD 30 MIN: CPT | Mod: 25 | Performed by: PODIATRIST

## 2017-10-09 NOTE — MR AVS SNAPSHOT
After Visit Summary   10/9/2017    Crispin Rojas    MRN: [id number]           Patient Information     Date Of Birth          1960        Visit Information        Provider Department      10/9/2017 7:15 AM Valentino Molina DPM [facility]        Today's Diagnoses     Type 2 diabetes mellitus with peripheral neuropathy (H)    -  1    Diabetic ulcer of toe of right foot associated with type 2 diabetes mellitus, with fat layer exposed (H)        Skin ulcer of right foot with fat layer exposed (H)           Follow-ups after your visit        Your next 10 appointments already scheduled     Oct 30, 2017  7:00 AM CDT   [facility] with Valent[facility]ina DPM   [facility]    [address] [place] [id number] [phone]              Who to contact     If you have questions or need follow up information about today's clinic visit or your schedule please contact [facility] [place] [place] at [phone].  Normal or non-critical lab and imaging results will be communicated to you by IDInteracthart, letter or phone within 4 business days after the clinic has received the results. If you do not hear from us within 7 days, please contact the clinic through IDInteracthart or phone. If you have a critical or abnormal lab result, we will notify you by phone as soon as possible.  Submit refill requests through Mediafly or call your pharmacy and they will forward the refill request to us. Please allow 3 business days for your refill to be completed.          Additional Information About Your Visit        IDInteracthart Information     Mediafly gives you secure access to your electronic health record. If you see a primary care provider, you can also send messages to your care team and make appointments. If you have questions, please call your primary care clinic.  If you do not have a primary care provider,  "please call [phone] and they will assist you.        Care EveryWhere ID     This is your Care EveryWhere ID. This could be used by other organizations to access your [facility] medical records  [id number]        Your Vitals Were     Height BMI (Body Mass Index)                5' 11\" (1.803 m) 34.87 kg/m2           Blood Pressure from Last 3 Encounters:   10/09/17 114/62   05/16/17 154/75   04/10/17 146/80    Weight from Last 3 Encounters:   10/09/17 250 lb (113.4 kg)   08/30/17 250 lb (113.4 kg)   05/16/17 251 lb (113.9 kg)              We Performed the Following     DEBRIDE SKIN/SUBQ TISSUE        Primary Care Provider Office Phone # Fax #    Aden Lopez [phone] [address] [facility] [address]        Equal Access to Services     [facility]: Hadii aad ku hadasho Soivonneali, waaxda luqadaha, qaybta kaalmada adeegyada, waxay jerin haymistyn sabina moran . So [facility] [phone].    ATENCIÓN: Si habla español, tiene a gomez disposición servicios gratuitos de asistencia lingüística. Michela al [phone].    We comply with applicable federal civil rights laws and [place] laws. We do not discriminate on the basis of race, color, national origin, age, disability, sex, sexual orientation, or gender identity.            Thank you!     Thank you for choosing [facility]  for your care. Our goal is always to provide you with excellent care. Hearing back from our patients is one way we can continue to improve our services. Please take a few minutes to complete the written survey that you may receive in the mail after your visit with us. Thank you!             Your Updated Medication List - Protect others around you: Learn how to safely use, store and throw away your medicines at www.disposemymeds.org.          This list is accurate as of: 10/9/17  7:41 AM.  Always use your most recent med list.                   Brand Name Dispense Instructions for " "use Diagnosis    amLODIPine 5 MG tablet    NORVASC    90 tablet    Take 1 tablet (5 mg) by mouth daily    Other specified transient cerebral ischemias       atorvastatin 40 MG tablet    LIPITOR    30 tablet    Take 1 tablet (40 mg) by mouth daily    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), Hyperlipidemia LDL goal <100       BD insulin syringe ultrafine 30G X 1/2\" 0.3 ML   Generic drug:  insulin syringe-needle U-100     100 each    1 Syringe daily. Use one syringe  daily or as directed.    Type 2 diabetes, HbA1C goal < 8% (H)       blood glucose monitoring meter device kit    no brand specified    1 kit    Use to test blood sugar 3 times daily or as directed.    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)       CIALIS 5 MG tablet   Generic drug:  tadalafil     30 tablet    TAKE 1 TABLET BY MOUTH EVERY DAY AS NEEDED    Erectile dysfunction due to diseases classified elsewhere       Co-Enzyme Q10 100 MG Caps      Take 100 mg by mouth daily        gabapentin 100 MG capsule    NEURONTIN     Take 300 mg by mouth At Bedtime        insulin glargine 100 UNIT/ML injection    LANTUS SOLOSTAR    60 mL    Take up to 50 units BID    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)       ketoconazole 2 % shampoo    NIZORAL    120 mL    Apply to the affected area and wash off after 5 minutes.    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)       MULTIVITAMIN PO      Take 1 tablet by mouth daily        " NovoLOG FLEXPEN 100 UNIT/ML injection   Generic drug:  insulin aspart     30 mL    INJECT 10 TO 35 UNITS UNDER THE SKIN THREE TIMES DAILY    Type 2 diabetes mellitus with diabetic peripheral angiopathy and gangrene, with long-term current use of insulin (H)       OMEGA-3 FISH OIL PO      Take 2 g by mouth daily        sitagliptin 100 MG tablet    JANUVIA    90 tablet    Take 1 tablet (100 mg) by mouth daily    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    Type 2 diabetes, HbA1C goal < 8% (H)       VITAMIN B 12 PO      Take 500 mcg by mouth daily        VITAMIN D3 PO      Take 1,000 Units by mouth daily

## 2017-10-09 NOTE — LETTER
"    10/9/2017         RE: Crispin Rojas  [id number] [facility] [address]        Dear Colleague,    Thank you for referring your patient, Crispin Rojas, to the [facility]. Please see a copy of my visit note below.      ASSESSMENT/PLAN:    Encounter Diagnoses   Name Primary?     Type 2 diabetes mellitus with peripheral neuropathy (H) Yes     Ulcer of right heel and midfoot with fat layer exposed (H)      Diabetic ulcer of toe of right foot associated with type 2 diabetes mellitus, with fat layer exposed (H)      I told Mr. Rojas that he has a serious condition and is at high risk for infection that could lead to partial foot amputation.  He acknowledged this. Off loading is a must, in addition to wound cares, monitoring and periodic excisional debridement.   The right 4th toe is also at risk.     CAM walker provided.    If the wound does not show interval healing at his next visit, non weight bearing will be recommended.    Wound Care Recommendations:    1)  Keep the wound covered by a bandage when bathing.    2)  Gently clean the wound with soap water, separate from bath/shower water.      3)  Each day, apply a topical antibiotic ointment to the wound (Neosporin, Triple antibiotic, Bacitracin).   Cover with large band-aid or gauze.      5)  Please seek immediate medical attention if any increasing redness, drainage, smell, or pain related to the wound.     6)  Please return to clinic in the period of time requested by Dr. Molina.        Excisional debridment procedure discussed.  Goals of removing non-viable tissue, evaluating full extent of wound, and promoting wound healing were explained.  Pt provided verbal and written consent.  A \"Time Out\" was called.     Using a sterile #15 blade and tissue nippers, excisional debridment of the plantar right foot ulcer was performed, full-thickness to the level of, and including, the subcutaneous tissue.  The hyperkeratotic eschar " was removed, by excising skin edges back to healthy, bleeding tissue .  Other non-viable tissue was excised. The ulcer base was scraped to remove bioburden and promote healing.  There was moderate bleeding with the procedure. No anesthesia needed due to peripheral neuropathy.  Sterile dressing applied.    The right 4th toe ulcer did not need debridement.       Body mass index is 34.87 kg/(m^2).    Weight management plan: Patient was referred to their PCP to discuss a diet and exercise plan.      Valentino Molina DPM, FACFAS, MS    [place] Department of Podiatry/Foot & Ankle Surgery      ____________________________________________________________________    HPI:         Chief Complaint: follow up for ulceration, right foot;  Type 2 diabetes with peripheral neuropathy  Onset of problem: 9-10 weeks  See the clinic note from 8/30/17 for details regarding history   No new concerns.  He left town on business and was not able to find his CAM walker prior to that.  So the foot has not been offloaded yet.     *  Past Medical History:   Diagnosis Date     Cerebral infarction (H)      Chronic infection      H/O heartburn      History of heartburn      Hypertension      Numbness and tingling      Obesity      Type II or unspecified type diabetes mellitus without mention of complication, not stated as uncontrolled    *  *  Past Surgical History:   Procedure Laterality Date     AMPUTATE FOOT Left 8/18/2016    Procedure: AMPUTATE FOOT;  Surgeon: Jack Younger DPM;  Location: [facility] OR     AMPUTATE TOE(S) Right 2/1/2016    Procedure: AMPUTATE TOE(S);  Surgeon: Rachelle Manriquez DPM, Pod;  Location: [facility] OR     ANGIOGRAM       COLONOSCOPY       COMBINED CYSTOSCOPY, RETROGRADES, URETEROSCOPY, INSERT STENT Left 8/21/2016    Procedure: COMBINED CYSTOSCOPY, RETROGRADES, URETEROSCOPY, INSERT STENT;  Surgeon: Artemio Valenzuela MD;  Location: [facility] OR     COMBINED CYSTOSCOPY, RETROGRADES, URETEROSCOPY, LASER HOLMIUM LITHOTRIPSY URETER(S),  INSERT STENT Left 10/3/2016    Procedure: COMBINED CYSTOSCOPY, RETROGRADES, URETEROSCOPY, LASER HOLMIUM LITHOTRIPSY URETER(S), INSERT STENT;  Surgeon: Artemio Valenzuela MD;  Location: [facility] OR     EXTRACORPOREAL SHOCK WAVE LITHOTRIPSY (ESWL) Left 9/8/2016    Procedure: EXTRACORPOREAL SHOCK WAVE LITHOTRIPSY (ESWL);  Surgeon: Artemio Valenzuela MD;  Location: [facility] OR     RECESSION GASTROCNEMIUS Right 2/1/2016    Procedure: RECESSION GASTROCNEMIUS;  Surgeon: Rachelle Manriquez, DPM, Pod;  Location: [facility] OR   *  *  Current Outpatient Prescriptions   Medication Sig Dispense Refill     metFORMIN (GLUCOPHAGE-XR) 500 MG 24 hr tablet TAKE 2 TABLETS BY MOUTH TWICE DAILY WITH MEALS 360 tablet 0     atorvastatin (LIPITOR) 40 MG tablet Take 1 tablet (40 mg) by mouth daily 30 tablet 1     NOVOLOG FLEXPEN 100 UNIT/ML soln INJECT 10 TO 35 UNITS UNDER THE SKIN THREE TIMES DAILY 30 mL 1     insulin glargine (LANTUS SOLOSTAR) 100 UNIT/ML injection Take up to 50 units BID 60 mL 1     sitagliptin (JANUVIA) 100 MG tablet Take 1 tablet (100 mg) by mouth daily 90 tablet 3     lisinopril (PRINIVIL/ZESTRIL) 20 MG tablet Take 1 tablet (20 mg) by mouth daily 90 tablet 3     gabapentin (NEURONTIN) 100 MG capsule Take 300 mg by mouth At Bedtime       amLODIPine (NORVASC) 5 MG tablet Take 1 tablet (5 mg) by mouth daily 90 tablet 1     CIALIS 5 MG tablet TAKE 1 TABLET BY MOUTH EVERY DAY AS NEEDED 30 tablet 0     blood glucose monitoring (NO BRAND SPECIFIED) meter device kit Use to test blood sugar 3 times daily or as directed. 1 kit 0     blood glucose monitoring (NO BRAND SPECIFIED) test strip Use to test blood sugars 3 times daily or as directed 300 strip 3     ketoconazole (NIZORAL) 2 % shampoo Apply to the affected area and wash off after 5 minutes. 120 mL 11     Co-Enzyme Q10 100 MG CAPS Take 100 mg by mouth daily        Cyanocobalamin (VITAMIN B 12 PO) Take 500 mcg by mouth daily        Cholecalciferol (VITAMIN D3 PO) Take 1,000 Units by  "mouth daily        Multiple Vitamins-Minerals (MULTIVITAMIN PO) Take 1 tablet by mouth daily        Omega-3 Fatty Acids (OMEGA-3 FISH OIL PO) Take 2 g by mouth daily        lancets thin MISC 1 each 3 times daily. 300 each 3     Insulin Syringe-Needle U-100 (BD INSULIN SYRINGE ULTRAFINE) 30G X 1/2\" 0.3 ML MISC 1 Syringe daily. Use one syringe  daily or as directed. 100 each prn     Social History:   Social History     Social History     Marital status:      Spouse name: Sydney     Number of children: 2     Years of education: N/A     Occupational History     marketing [employer]     Social History Main Topics     Smoking status: Never Smoker     Smokeless tobacco: Never Used     Alcohol use 0.0 oz/week     0 Standard drinks or equivalent per week      Comment: rare---red wine 2x per week     Drug use: No     Sexual activity: Yes     Partners: Female     Other Topics Concern     Parent/Sibling W/ Cabg, Mi Or Angioplasty Before 65f 55m? No     Social History Narrative       Family history:  Family History   Problem Relation Age of Onset     Arthritis Mother      SLE     Connective Tissue Disorder Mother      lupus     DIABETES Mother      CEREBROVASCULAR DISEASE Mother      CANCER Mother      DIABETES Father      DIABETES Maternal Grandfather      DIABETES Sister            EXAM:    Vitals: /62 (BP Location: Left arm, Cuff Size: Adult Large)  Ht 5' 11\" (1.803 m)  Wt 250 lb (113.4 kg)  BMI 34.87 kg/m2  BMI: Body mass index is 34.87 kg/(m^2).  Height: 5' 11\"    Constitutional/ general:  Pt is in no apparent distress, appears well-nourished.  Cooperative with history and physical exam.      Vascular:  Pedal pulses are palpable bilaterally for both the DP and PT arteries.  CFT < 3 sec.  No edema.  Pedal hair growth noted.      Neuro:  Alert and oriented x 3. Coordinated gait.  Light touch sensation is diminished to the L4, L5, S1 distributions.  No evidence of neurological-based weakness, " spasticity, or contracture in the lower extremities.      Derm: Full thickness ulceration to the level of the subcutaneous fat, sub right 1st metatarsal head.  Base is granular.  Prolific hyperkeratotic eschar around the edges.  No purulence. The wound does not probe to bone.  1.2cm diameter, round, x 0.2cm deep.     0.4cm diameter ulceration, dorsal right 4th toe; granular, red base. No erythema or edema. It does not probe to bone.      Musculoskeletal:    Lower extremity muscle strength is normal.  Patient is ambulatory without an assistive device or brace .  No gross deformities.  There is b/l limited ankle dorsiflexion with knee extended.   ROM is increased with flexion of the knee b/l.        Valentino Molina DPM, FACFAS, MS    [place] Department of Podiatry/Foot & Ankle Surgery                Again, thank you for allowing me to participate in the care of your patient.        Sincerely,        Valentino Molina DPM

## 2017-10-09 NOTE — NURSING NOTE
"Chief Complaint   Patient presents with     RECHECK     wound of right foot       Initial /62 (BP Location: Left arm, Cuff Size: Adult Large)  Ht 5' 11\" (1.803 m)  Wt 250 lb (113.4 kg)  BMI 34.87 kg/m2 Estimated body mass index is 34.87 kg/(m^2) as calculated from the following:    Height as of this encounter: 5' 11\" (1.803 m).    Weight as of this encounter: 250 lb (113.4 kg).  Medication Reconciliation: complete   Obdulia Ireland MA      "

## 2017-10-09 NOTE — PROGRESS NOTES
"  ASSESSMENT/PLAN:    Encounter Diagnoses   Name Primary?     Type 2 diabetes mellitus with peripheral neuropathy (H) Yes     Ulcer of right heel and midfoot with fat layer exposed (H)      Diabetic ulcer of toe of right foot associated with type 2 diabetes mellitus, with fat layer exposed (H)      I told Mr. Rojas that he has a serious condition and is at high risk for infection that could lead to partial foot amputation.  He acknowledged this. Off loading is a must, in addition to wound cares, monitoring and periodic excisional debridement.   The right 4th toe is also at risk.     CAM walker provided.    If the wound does not show interval healing at his next visit, non weight bearing will be recommended.    Wound Care Recommendations:    1)  Keep the wound covered by a bandage when bathing.    2)  Gently clean the wound with soap water, separate from bath/shower water.      3)  Each day, apply a topical antibiotic ointment to the wound (Neosporin, Triple antibiotic, Bacitracin).   Cover with large band-aid or gauze.      5)  Please seek immediate medical attention if any increasing redness, drainage, smell, or pain related to the wound.     6)  Please return to clinic in the period of time requested by Dr. Molina.        Excisional debridment procedure discussed.  Goals of removing non-viable tissue, evaluating full extent of wound, and promoting wound healing were explained.  Pt provided verbal and written consent.  A \"Time Out\" was called.     Using a sterile #15 blade and tissue nippers, excisional debridment of the plantar right foot ulcer was performed, full-thickness to the level of, and including, the subcutaneous tissue.  The hyperkeratotic eschar was removed, by excising skin edges back to healthy, bleeding tissue .  Other non-viable tissue was excised. The ulcer base was scraped to remove bioburden and promote healing.  There was moderate bleeding with the procedure. No anesthesia needed due to " peripheral neuropathy.  Sterile dressing applied.    The right 4th toe ulcer did not need debridement.       Body mass index is 34.87 kg/(m^2).    Weight management plan: Patient was referred to their PCP to discuss a diet and exercise plan.      Valentino Molina DPM, FACFAS, MS    [place] Department of Podiatry/Foot & Ankle Surgery      ____________________________________________________________________    HPI:         Chief Complaint: follow up for ulceration, right foot;  Type 2 diabetes with peripheral neuropathy  Onset of problem: 9-10 weeks  See the clinic note from 8/30/17 for details regarding history   No new concerns.  He left town on business and was not able to find his CAM walker prior to that.  So the foot has not been offloaded yet.     *  Past Medical History:   Diagnosis Date     Cerebral infarction (H)      Chronic infection      H/O heartburn      History of heartburn      Hypertension      Numbness and tingling      Obesity      Type II or unspecified type diabetes mellitus without mention of complication, not stated as uncontrolled    *  *  Past Surgical History:   Procedure Laterality Date     AMPUTATE FOOT Left 8/18/2016    Procedure: AMPUTATE FOOT;  Surgeon: Jack Younger DPM;  Location: [facility] OR     AMPUTATE TOE(S) Right 2/1/2016    Procedure: AMPUTATE TOE(S);  Surgeon: Rachelle Manriquez DPM, Pod;  Location: [facility] OR     ANGIOGRAM       COLONOSCOPY       COMBINED CYSTOSCOPY, RETROGRADES, URETEROSCOPY, INSERT STENT Left 8/21/2016    Procedure: COMBINED CYSTOSCOPY, RETROGRADES, URETEROSCOPY, INSERT STENT;  Surgeon: Artemio Valenzuela MD;  Location: [facility] OR     COMBINED CYSTOSCOPY, RETROGRADES, URETEROSCOPY, LASER HOLMIUM LITHOTRIPSY URETER(S), INSERT STENT Left 10/3/2016    Procedure: COMBINED CYSTOSCOPY, RETROGRADES, URETEROSCOPY, LASER HOLMIUM LITHOTRIPSY URETER(S), INSERT STENT;  Surgeon: Artemio Valenzuela MD;  Location:  OR     EXTRACORPOREAL SHOCK WAVE LITHOTRIPSY (ESWL) Left  9/8/2016    Procedure: EXTRACORPOREAL SHOCK WAVE LITHOTRIPSY (ESWL);  Surgeon: Artemio Valenzuela MD;  Location: [facility] OR     RECESSION GASTROCNEMIUS Right 2/1/2016    Procedure: RECESSION GASTROCNEMIUS;  Surgeon: Rachelle Manriquez, DPM, Pod;  Location: [facility] OR   *  *  Current Outpatient Prescriptions   Medication Sig Dispense Refill     metFORMIN (GLUCOPHAGE-XR) 500 MG 24 hr tablet TAKE 2 TABLETS BY MOUTH TWICE DAILY WITH MEALS 360 tablet 0     atorvastatin (LIPITOR) 40 MG tablet Take 1 tablet (40 mg) by mouth daily 30 tablet 1     NOVOLOG FLEXPEN 100 UNIT/ML soln INJECT 10 TO 35 UNITS UNDER THE SKIN THREE TIMES DAILY 30 mL 1     insulin glargine (LANTUS SOLOSTAR) 100 UNIT/ML injection Take up to 50 units BID 60 mL 1     sitagliptin (JANUVIA) 100 MG tablet Take 1 tablet (100 mg) by mouth daily 90 tablet 3     lisinopril (PRINIVIL/ZESTRIL) 20 MG tablet Take 1 tablet (20 mg) by mouth daily 90 tablet 3     gabapentin (NEURONTIN) 100 MG capsule Take 300 mg by mouth At Bedtime       amLODIPine (NORVASC) 5 MG tablet Take 1 tablet (5 mg) by mouth daily 90 tablet 1     CIALIS 5 MG tablet TAKE 1 TABLET BY MOUTH EVERY DAY AS NEEDED 30 tablet 0     blood glucose monitoring (NO BRAND SPECIFIED) meter device kit Use to test blood sugar 3 times daily or as directed. 1 kit 0     blood glucose monitoring (NO BRAND SPECIFIED) test strip Use to test blood sugars 3 times daily or as directed 300 strip 3     ketoconazole (NIZORAL) 2 % shampoo Apply to the affected area and wash off after 5 minutes. 120 mL 11     Co-Enzyme Q10 100 MG CAPS Take 100 mg by mouth daily        Cyanocobalamin (VITAMIN B 12 PO) Take 500 mcg by mouth daily        Cholecalciferol (VITAMIN D3 PO) Take 1,000 Units by mouth daily        Multiple Vitamins-Minerals (MULTIVITAMIN PO) Take 1 tablet by mouth daily        Omega-3 Fatty Acids (OMEGA-3 FISH OIL PO) Take 2 g by mouth daily        lancets thin MISC 1 each 3 times daily. 300 each 3     Insulin Syringe-Needle  "U-100 (BD INSULIN SYRINGE ULTRAFINE) 30G X 1/2\" 0.3 ML MISC 1 Syringe daily. Use one syringe  daily or as directed. 100 each prn     Social History:   Social History     Social History     Marital status:      Spouse name: Sydney     Number of children: 2     Years of education: N/A     Occupational History     marketing [employer]     Social History Main Topics     Smoking status: Never Smoker     Smokeless tobacco: Never Used     Alcohol use 0.0 oz/week     0 Standard drinks or equivalent per week      Comment: rare---red wine 2x per week     Drug use: No     Sexual activity: Yes     Partners: Female     Other Topics Concern     Parent/Sibling W/ Cabg, Mi Or Angioplasty Before 65f 55m? No     Social History Narrative       Family history:  Family History   Problem Relation Age of Onset     Arthritis Mother      SLE     Connective Tissue Disorder Mother      lupus     DIABETES Mother      CEREBROVASCULAR DISEASE Mother      CANCER Mother      DIABETES Father      DIABETES Maternal Grandfather      DIABETES Sister            EXAM:    Vitals: /62 (BP Location: Left arm, Cuff Size: Adult Large)  Ht 5' 11\" (1.803 m)  Wt 250 lb (113.4 kg)  BMI 34.87 kg/m2  BMI: Body mass index is 34.87 kg/(m^2).  Height: 5' 11\"    Constitutional/ general:  Pt is in no apparent distress, appears well-nourished.  Cooperative with history and physical exam.      Vascular:  Pedal pulses are palpable bilaterally for both the DP and PT arteries.  CFT < 3 sec.  No edema.  Pedal hair growth noted.      Neuro:  Alert and oriented x 3. Coordinated gait.  Light touch sensation is diminished to the L4, L5, S1 distributions.  No evidence of neurological-based weakness, spasticity, or contracture in the lower extremities.      Derm: Full thickness ulceration to the level of the subcutaneous fat, sub right 1st metatarsal head.  Base is granular.  Prolific hyperkeratotic eschar around the edges.  No purulence. The wound " does not probe to bone.  1.2cm diameter, round, x 0.2cm deep.     0.4cm diameter ulceration, dorsal right 4th toe; granular, red base. No erythema or edema. It does not probe to bone.      Musculoskeletal:    Lower extremity muscle strength is normal.  Patient is ambulatory without an assistive device or brace .  No gross deformities.  There is b/l limited ankle dorsiflexion with knee extended.   ROM is increased with flexion of the knee b/l.        Valentino Molina DPM, FACFAS, MS    [place] Department of Podiatry/Foot & Ankle Surgery

## 2017-10-20 DIAGNOSIS — E11.52 TYPE 2 DIABETES MELLITUS WITH DIABETIC PERIPHERAL ANGIOPATHY AND GANGRENE, WITH LONG-TERM CURRENT USE OF INSULIN (H): ICD-10-CM

## 2017-10-20 DIAGNOSIS — E78.5 HYPERLIPIDEMIA LDL GOAL <100: ICD-10-CM

## 2017-10-20 DIAGNOSIS — Z79.4 TYPE 2 DIABETES MELLITUS WITH DIABETIC PERIPHERAL ANGIOPATHY AND GANGRENE, WITH LONG-TERM CURRENT USE OF INSULIN (H): ICD-10-CM

## 2017-10-20 DIAGNOSIS — E11.49 DIABETES MELLITUS TYPE 2 WITH NEUROLOGICAL MANIFESTATIONS (H): ICD-10-CM

## 2017-10-20 NOTE — TELEPHONE ENCOUNTER
Last Office Visit with [facility], [facility] or [facility] prescribing provider: 3/31/2017    Next 5 appointments (look out 90 days)     Oct 30, 2017  7:00 AM CDT   [facility] Visit with Valentino Molina DPM   [facility]    [address] [address] [phone]

## 2017-10-24 RX ORDER — ATORVASTATIN CALCIUM 40 MG/1
TABLET, FILM COATED ORAL
Qty: 30 TABLET | Refills: 0 | Status: SHIPPED | OUTPATIENT
Start: 2017-10-24 | End: 2017-11-22

## 2017-10-24 RX ORDER — INSULIN ASPART 100 [IU]/ML
INJECTION, SOLUTION INTRAVENOUS; SUBCUTANEOUS
Qty: 30 ML | Refills: 0 | Status: SHIPPED | OUTPATIENT
Start: 2017-10-24 | End: 2018-03-02

## 2017-10-24 NOTE — TELEPHONE ENCOUNTER
Medication is being filled for 1 time refill only due to:  Patient needs to be seen because needs fasting DM appt.   Jason Hendrickson RN, BSN

## 2017-10-26 DIAGNOSIS — G45.8 OTHER SPECIFIED TRANSIENT CEREBRAL ISCHEMIAS: ICD-10-CM

## 2017-10-26 RX ORDER — AMLODIPINE BESYLATE 5 MG/1
TABLET ORAL
Qty: 90 TABLET | Refills: 0 | Status: SHIPPED | OUTPATIENT
Start: 2017-10-26 | End: 2018-02-04

## 2017-10-26 NOTE — TELEPHONE ENCOUNTER
Last Office Visit with [facility], [facility] or [facility] prescribing provider: 3/31/2017    Next 5 appointments (look out 90 days)     Oct 30, 2017  7:00 AM CDT   Return Visit with Valentino Molina DPM   [facility]    [address] [address] [phone]

## 2017-10-26 NOTE — TELEPHONE ENCOUNTER
Amlodipine      Last Written Prescription Date: 4/12/2017  Last Fill Quantity: 90, # refills: 1    Last Office Visit with [facility], [facility] or [facility] prescribing provider:  3/31/2017   Future Office Visit:    Next 5 appointments (look out 90 days)     Oct 30, 2017  7:00 AM CDT   Return Visit with Valentino Molina DPM   [facility] ([facility])    [address] [place] [id number] [phone]                    BP Readings from Last 3 Encounters:   10/09/17 114/62   05/16/17 154/75   04/10/17 146/80     Medication is being filled for 1 time refill only due to:  Patient needs to be seen because due for diabetes follow up.     Prescription approved per [facility] Refill Protocol.    Tara PATEL RN, BSN, PHN  [facility]

## 2017-10-30 ENCOUNTER — OFFICE VISIT (OUTPATIENT)
Dept: PODIATRY | Facility: CLINIC | Age: 57
End: 2017-10-30
Payer: COMMERCIAL

## 2017-10-30 VITALS
HEART RATE: 52 BPM | OXYGEN SATURATION: 95 % | SYSTOLIC BLOOD PRESSURE: 158 MMHG | BODY MASS INDEX: 35 KG/M2 | WEIGHT: 250 LBS | HEIGHT: 71 IN | DIASTOLIC BLOOD PRESSURE: 68 MMHG

## 2017-10-30 DIAGNOSIS — E11.42 TYPE 2 DIABETES MELLITUS WITH PERIPHERAL NEUROPATHY (H): Primary | ICD-10-CM

## 2017-10-30 DIAGNOSIS — L97.412: ICD-10-CM

## 2017-10-30 PROCEDURE — 99213 OFFICE O/P EST LOW 20 MIN: CPT | Mod: 25 | Performed by: PODIATRIST

## 2017-10-30 PROCEDURE — 11042 DBRDMT SUBQ TIS 1ST 20SQCM/<: CPT | Performed by: PODIATRIST

## 2017-10-30 NOTE — NURSING NOTE
"Chief Complaint   Patient presents with     Musculoskeletal Problem     R foot not healing       Initial /68  Pulse 52  Wt 250 lb (113.4 kg)  SpO2 95%  BMI 34.87 kg/m2 Estimated body mass index is 34.87 kg/(m^2) as calculated from the following:    Height as of 10/9/17: 5' 11\" (1.803 m).    Weight as of this encounter: 250 lb (113.4 kg).  Medication Reconciliation: complete   Jessica Mendes CMA      "

## 2017-10-30 NOTE — PROGRESS NOTES
"ASSESSMENT/PLAN:    Encounter Diagnoses   Name Primary?     Type 2 diabetes mellitus with peripheral neuropathy (H) Yes     Ulcer of midfoot, right, with fat layer exposed (H)      As always, I reviewed need for offloading and risk of infection.  I asked him if he would like me to order crutches or a wheeled knee walker.  He said he would prefer to stick with the CAM walker.  He also is done working at the [employer] for the year and \"that should help.\"     I am not sure if he is grasping the potential seriousness of the chronic ulceration, yet he has lost part of his right great toe and the entire left great toe due to amputation to treat infection.    Wound cares reviewed:  Cleansing, topical antibiotic, gauze    He is to monitor for redness, drainage, swelling    Follow up in 3-4 weeks.    Excisional debridment procedure discussed.  Goals of removing non-viable tissue, evaluating full extent of wound, and promoting wound healing were explained.  Pt provided verbal and written consent.  A \"Time Out\" was called.     Using a sterile #15 blade and tissue nippers, excisional debridment of the right foot ulcer was performed, full-thickness to the level of, and including, the subcutaneous tissue.  The hyperkeratotic eschar was removed, by excising skin edges back to healthy, bleeding tissue .  Other non-viable tissue was excised. The ulcer base was scraped to remove bioburden and promote healing.  There was moderate bleeding with the procedure. No anesthesia needed due to peripheral neuropathy.  Sterile dressing applied.      Weight management plan: Patient was referred to their PCP to discuss a diet and exercise plan.      Valentino Molina DPM, FACFAS, [facility] Department of Podiatry/Foot & Ankle Surgery      ____________________________________________________________________    HPI:       Chief Complaint: follow up for ulceration, right foot;  Type 2 diabetes with peripheral neuropathy  Onset of problem: 9-10 " "weeks  See the clinic note from 8/30/17 for details regarding history   No new concerns.  A CAM walker was provided at his last visit.    He said he has used the CAM walker somewhat inconsistently.  \"The air pump doesn't work anymore.\"     *  Past Medical History:   Diagnosis Date     Cerebral infarction (H)      Chronic infection      H/O heartburn      History of heartburn      Hypertension      Numbness and tingling      Obesity      Type II or unspecified type diabetes mellitus without mention of complication, not stated as uncontrolled    *  *  Past Surgical History:   Procedure Laterality Date     AMPUTATE FOOT Left 8/18/2016    Procedure: AMPUTATE FOOT;  Surgeon: Jack Younger DPM;  Location: [facility]     AMPUTATE TOE(S) Right 2/1/2016    Procedure: AMPUTATE TOE(S);  Surgeon: Rachelle Manriquez DPM, Pod;  Location: [facility] OR     ANGIOGRAM       COLONOSCOPY       COMBINED CYSTOSCOPY, RETROGRADES, URETEROSCOPY, INSERT STENT Left 8/21/2016    Procedure: COMBINED CYSTOSCOPY, RETROGRADES, URETEROSCOPY, INSERT STENT;  Surgeon: Artemio Valenzuela MD;  Location: [facility] OR     COMBINED CYSTOSCOPY, RETROGRADES, URETEROSCOPY, LASER HOLMIUM LITHOTRIPSY URETER(S), INSERT STENT Left 10/3/2016    Procedure: COMBINED CYSTOSCOPY, RETROGRADES, URETEROSCOPY, LASER HOLMIUM LITHOTRIPSY URETER(S), INSERT STENT;  Surgeon: Artemio Valenzuela MD;  Location: [facility] OR     EXTRACORPOREAL SHOCK WAVE LITHOTRIPSY (ESWL) Left 9/8/2016    Procedure: EXTRACORPOREAL SHOCK WAVE LITHOTRIPSY (ESWL);  Surgeon: Artemio Valenzuela MD;  Location: [facility] OR     RECESSION GASTROCNEMIUS Right 2/1/2016    Procedure: RECESSION GASTROCNEMIUS;  Surgeon: Rachelle Manriquez DPM, Pod;  Location: [facility] OR   *  *  Current Outpatient Prescriptions   Medication Sig Dispense Refill     amLODIPine (NORVASC) 5 MG tablet TAKE 1 TABLET(5 MG) BY MOUTH DAILY 90 tablet 0     atorvastatin (LIPITOR) 40 MG tablet TAKE 1 TABLET BY MOUTH EVERY DAY 30 tablet 0     NOVOLOG FLEXPEN " "100 UNIT/ML soln INJECT 10 TO 35 UNITS UNDER THE SKIN THREE TIMES DAILY 30 mL 0     order for DME CAM boot, Large 1 Device 0     metFORMIN (GLUCOPHAGE-XR) 500 MG 24 hr tablet TAKE 2 TABLETS BY MOUTH TWICE DAILY WITH MEALS 360 tablet 0     insulin glargine (LANTUS SOLOSTAR) 100 UNIT/ML injection Take up to 50 units BID 60 mL 1     sitagliptin (JANUVIA) 100 MG tablet Take 1 tablet (100 mg) by mouth daily 90 tablet 3     lisinopril (PRINIVIL/ZESTRIL) 20 MG tablet Take 1 tablet (20 mg) by mouth daily 90 tablet 3     gabapentin (NEURONTIN) 100 MG capsule Take 300 mg by mouth At Bedtime       CIALIS 5 MG tablet TAKE 1 TABLET BY MOUTH EVERY DAY AS NEEDED 30 tablet 0     blood glucose monitoring (NO BRAND SPECIFIED) meter device kit Use to test blood sugar 3 times daily or as directed. 1 kit 0     blood glucose monitoring (NO BRAND SPECIFIED) test strip Use to test blood sugars 3 times daily or as directed 300 strip 3     ketoconazole (NIZORAL) 2 % shampoo Apply to the affected area and wash off after 5 minutes. 120 mL 11     Co-Enzyme Q10 100 MG CAPS Take 100 mg by mouth daily        Cyanocobalamin (VITAMIN B 12 PO) Take 500 mcg by mouth daily        Cholecalciferol (VITAMIN D3 PO) Take 1,000 Units by mouth daily        Multiple Vitamins-Minerals (MULTIVITAMIN PO) Take 1 tablet by mouth daily        Omega-3 Fatty Acids (OMEGA-3 FISH OIL PO) Take 2 g by mouth daily        lancets thin MISC 1 each 3 times daily. 300 each 3     Insulin Syringe-Needle U-100 (BD INSULIN SYRINGE ULTRAFINE) 30G X 1/2\" 0.3 ML MISC 1 Syringe daily. Use one syringe  daily or as directed. 100 each prn       EXAM:    Vitals: There were no vitals taken for this visit.  BMI: There is no height or weight on file to calculate BMI.  Height: Data Unavailable    Constitutional/ general:  Pt is in no apparent distress, appears well-nourished.  Cooperative with history and physical exam.       Vascular:  Pedal pulses are palpable bilaterally for both the DP " and PT arteries.  CFT < 3 sec.  No edema.  Pedal hair growth noted.       Neuro:  Alert and oriented x 3. Coordinated gait.  Light touch sensation is diminished to the L4, L5, S1 distributions.  No evidence of neurological-based weakness, spasticity, or contracture in the lower extremities.       Derm: Full thickness ulceration to the level of the subcutaneous fat, sub right 1st metatarsal head.  Base is partially granular.  Prolific hyperkeratotic eschar around the edges.  No purulence. The wound does not probe to bone.  0.8cm diameter, round, x 0.2cm deep.      Dorsal right 4th toe ulcer appears to have healed.      Musculoskeletal:    Lower extremity muscle strength is normal.  Patient is ambulatory without an assistive device or brace .  No gross deformities.  There is b/l limited ankle dorsiflexion with knee extended.   ROM is increased with flexion of the knee b/l.  Digital contractures.     Valentino Molina DPM, FACFAS, MS    [facility] Department of Podiatry/Foot & Ankle Surgery

## 2017-10-30 NOTE — MR AVS SNAPSHOT
After Visit Summary   10/30/2017    Crispin Rojas    MRN: [id number]           Patient Information     Date Of Birth          1960        Visit Information        Provider Department      10/30/2017 7:00 AM Valentino Molina DPM [facility]        Today's Diagnoses     Type 2 diabetes mellitus with peripheral neuropathy (H)    -  1    Ulcer of midfoot, right, with fat layer exposed (H)           Follow-ups after your visit        Your next 10 appointments already scheduled     Nov 27, 2017  7:00 AM CST   Return Visit with Valentino Molina DPM   [facility] ([facility])    [address] [address] [phone]              Who to contact     If you have questions or need follow up information about today's clinic visit or your schedule please contact [facility] directly at [phone].  Normal or non-critical lab and imaging results will be communicated to you by Rakutenhart, letter or phone within 4 business days after the clinic has received the results. If you do not hear from us within 7 days, please contact the clinic through Mingleverset or phone. If you have a critical or abnormal lab result, we will notify you by phone as soon as possible.  Submit refill requests through Conjectur or call your pharmacy and they will forward the refill request to us. Please allow 3 business days for your refill to be completed.          Additional Information About Your Visit        MyChart Information     Conjectur gives you secure access to your electronic health record. If you see a primary care provider, you can also send messages to your care team and make appointments. If you have questions, please call your primary care clinic.  If you do not have a primary care provider, please call [phone] and they will assist you.        Care EveryWhere ID     This is your Care EveryWhere ID.  This could be used by other organizations to access your [place] medical records  [id number]        Your Vitals Were     Pulse Pulse Oximetry BMI (Body Mass Index)             52 95% 34.87 kg/m2          Blood Pressure from Last 3 Encounters:   10/30/17 158/68   10/09/17 114/62   05/16/17 154/75    Weight from Last 3 Encounters:   10/30/17 250 lb (113.4 kg)   10/09/17 250 lb (113.4 kg)   08/30/17 250 lb (113.4 kg)              We Performed the Following     DEBRIDE SKIN/SUBQ TISSUE        Primary Care Provider Office Phone # Fax #    Aden Ethan Lopez [phone] [address] [facility] [address]        Equal Access to Services     ELI MARTÍNEZ : Hadii geraldine pisanoo Socas, waaxda luqadaha, qaybta kaalmada adeegyada, blanca moran . So [facility] [phone].    ATENCIÓN: Si habla español, tiene a gomez disposición servicios gratuitos de asistencia lingüística. Llame al [phone].    We comply with applicable federal civil rights laws and [place] laws. We do not discriminate on the basis of race, color, national origin, age, disability, sex, sexual orientation, or gender identity.            Thank you!     Thank you for choosing [facility]  for your care. Our goal is always to provide you with excellent care. Hearing back from our patients is one way we can continue to improve our services. Please take a few minutes to complete the written survey that you may receive in the mail after your visit with us. Thank you!             Your Updated Medication List - Protect others around you: Learn how to safely use, store and throw away your medicines at www.disposemymeds.org.          This list is accurate as of: 10/30/17  7:34 AM.  Always use your most recent med list.                   Brand Name Dispense Instructions for use Diagnosis    amLODIPine 5 MG tablet    NORVASC    90 tablet    TAKE 1 TABLET(5 MG) BY MOUTH DAILY    Other  "specified transient cerebral ischemias       atorvastatin 40 MG tablet    LIPITOR    30 tablet    TAKE 1 TABLET BY MOUTH EVERY DAY    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), Hyperlipidemia LDL goal <100       BD insulin syringe ultrafine 30G X 1/2\" 0.3 ML   Generic drug:  insulin syringe-needle U-100     100 each    1 Syringe daily. Use one syringe  daily or as directed.    Type 2 diabetes, HbA1C goal < 8% (H)       blood glucose monitoring meter device kit    no brand specified    1 kit    Use to test blood sugar 3 times daily or as directed.    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)       CIALIS 5 MG tablet   Generic drug:  tadalafil     30 tablet    TAKE 1 TABLET BY MOUTH EVERY DAY AS NEEDED    Erectile dysfunction due to diseases classified elsewhere       Co-Enzyme Q10 100 MG Caps      Take 100 mg by mouth daily        gabapentin 100 MG capsule    NEURONTIN     Take 300 mg by mouth At Bedtime        insulin glargine 100 UNIT/ML injection    LANTUS SOLOSTAR    60 mL    Take up to 50 units BID    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)       ketoconazole 2 % shampoo    NIZORAL    120 mL    Apply to the affected area and wash off after 5 minutes.    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)       MULTIVITAMIN PO      Take 1 tablet by mouth daily        NovoLOG FLEXPEN 100 UNIT/ML injection   Generic drug:  insulin aspart     30 mL    INJECT 10 TO 35 UNITS UNDER THE " SKIN THREE TIMES DAILY    Type 2 diabetes mellitus with diabetic peripheral angiopathy and gangrene, with long-term current use of insulin (H)       OMEGA-3 FISH OIL PO      Take 2 g by mouth daily        order for DME     1 Device    CAM boot, Large    Type 2 diabetes mellitus with peripheral neuropathy (H), Skin ulcer of right foot with fat layer exposed (H)       sitagliptin 100 MG tablet    JANUVIA    90 tablet    Take 1 tablet (100 mg) by mouth daily    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    Type 2 diabetes, HbA1C goal < 8% (H)       VITAMIN B 12 PO      Take 500 mcg by mouth daily        VITAMIN D3 PO      Take 1,000 Units by mouth daily

## 2017-10-30 NOTE — LETTER
"    10/30/2017         RE: Crispin Rojas  [address] [facility] [address]        Dear Colleague,    Thank you for referring your patient, Crispin Rojas, to the [facility]. Please see a copy of my visit note below.    ASSESSMENT/PLAN:    Encounter Diagnoses   Name Primary?     Type 2 diabetes mellitus with peripheral neuropathy (H) Yes     Ulcer of midfoot, right, with fat layer exposed (H)      As always, I reviewed need for offloading and risk of infection.  I asked him if he would like me to order crutches or a wheeled knee walker.  He said he would prefer to stick with the CAM walker.  He also is done working at the [employer] for the year and \"that should help.\"     I am not sure if he is grasping the potential seriousness of the chronic ulceration, yet he has lost part of his right great toe and the entire left great toe due to amputation to treat infection.    Wound cares reviewed:  Cleansing, topical antibiotic, gauze    He is to monitor for redness, drainage, swelling    Follow up in 3-4 weeks.    Excisional debridment procedure discussed.  Goals of removing non-viable tissue, evaluating full extent of wound, and promoting wound healing were explained.  Pt provided verbal and written consent.  A \"Time Out\" was called.     Using a sterile #15 blade and tissue nippers, excisional debridment of the right foot ulcer was performed, full-thickness to the level of, and including, the subcutaneous tissue.  The hyperkeratotic eschar was removed, by excising skin edges back to healthy, bleeding tissue .  Other non-viable tissue was excised. The ulcer base was scraped to remove bioburden and promote healing.  There was moderate bleeding with the procedure. No anesthesia needed due to peripheral neuropathy.  Sterile dressing applied.      Weight management plan: Patient was referred to their PCP to discuss a diet and exercise plan.      Valentino Molina DPM, FACFAS, " "[facility] Department of Podiatry/Foot & Ankle Surgery      ____________________________________________________________________    HPI:       Chief Complaint: follow up for ulceration, right foot;  Type 2 diabetes with peripheral neuropathy  Onset of problem: 9-10 weeks  See the clinic note from 8/30/17 for details regarding history   No new concerns.  A CAM walker was provided at his last visit.    He said he has used the CAM walker somewhat inconsistently.  \"The air pump doesn't work anymore.\"     *  Past Medical History:   Diagnosis Date     Cerebral infarction (H)      Chronic infection      H/O heartburn      History of heartburn      Hypertension      Numbness and tingling      Obesity      Type II or unspecified type diabetes mellitus without mention of complication, not stated as uncontrolled    *  *  Past Surgical History:   Procedure Laterality Date     AMPUTATE FOOT Left 8/18/2016    Procedure: AMPUTATE FOOT;  Surgeon: Jack Younger DPM;  Location: [facility]     AMPUTATE TOE(S) Right 2/1/2016    Procedure: AMPUTATE TOE(S);  Surgeon: Rachelle Manriquez DPM, Pod;  Location: [facility] OR     ANGIOGRAM       COLONOSCOPY       COMBINED CYSTOSCOPY, RETROGRADES, URETEROSCOPY, INSERT STENT Left 8/21/2016    Procedure: COMBINED CYSTOSCOPY, RETROGRADES, URETEROSCOPY, INSERT STENT;  Surgeon: Artemio Valenzuela MD;  Location: [facility] OR     COMBINED CYSTOSCOPY, RETROGRADES, URETEROSCOPY, LASER HOLMIUM LITHOTRIPSY URETER(S), INSERT STENT Left 10/3/2016    Procedure: COMBINED CYSTOSCOPY, RETROGRADES, URETEROSCOPY, LASER HOLMIUM LITHOTRIPSY URETER(S), INSERT STENT;  Surgeon: Artemio Valenzuela MD;  Location: [facility] OR     EXTRACORPOREAL SHOCK WAVE LITHOTRIPSY (ESWL) Left 9/8/2016    Procedure: EXTRACORPOREAL SHOCK WAVE LITHOTRIPSY (ESWL);  Surgeon: Artemio Valenzuela MD;  Location: [facility] OR     RECESSION GASTROCNEMIUS Right 2/1/2016    Procedure: RECESSION GASTROCNEMIUS;  Surgeon: Rachelle Manriquez DPM, Pod;  Location: " "[facility] OR   *  *  Current Outpatient Prescriptions   Medication Sig Dispense Refill     amLODIPine (NORVASC) 5 MG tablet TAKE 1 TABLET(5 MG) BY MOUTH DAILY 90 tablet 0     atorvastatin (LIPITOR) 40 MG tablet TAKE 1 TABLET BY MOUTH EVERY DAY 30 tablet 0     NOVOLOG FLEXPEN 100 UNIT/ML soln INJECT 10 TO 35 UNITS UNDER THE SKIN THREE TIMES DAILY 30 mL 0     order for DME CAM boot, Large 1 Device 0     metFORMIN (GLUCOPHAGE-XR) 500 MG 24 hr tablet TAKE 2 TABLETS BY MOUTH TWICE DAILY WITH MEALS 360 tablet 0     insulin glargine (LANTUS SOLOSTAR) 100 UNIT/ML injection Take up to 50 units BID 60 mL 1     sitagliptin (JANUVIA) 100 MG tablet Take 1 tablet (100 mg) by mouth daily 90 tablet 3     lisinopril (PRINIVIL/ZESTRIL) 20 MG tablet Take 1 tablet (20 mg) by mouth daily 90 tablet 3     gabapentin (NEURONTIN) 100 MG capsule Take 300 mg by mouth At Bedtime       CIALIS 5 MG tablet TAKE 1 TABLET BY MOUTH EVERY DAY AS NEEDED 30 tablet 0     blood glucose monitoring (NO BRAND SPECIFIED) meter device kit Use to test blood sugar 3 times daily or as directed. 1 kit 0     blood glucose monitoring (NO BRAND SPECIFIED) test strip Use to test blood sugars 3 times daily or as directed 300 strip 3     ketoconazole (NIZORAL) 2 % shampoo Apply to the affected area and wash off after 5 minutes. 120 mL 11     Co-Enzyme Q10 100 MG CAPS Take 100 mg by mouth daily        Cyanocobalamin (VITAMIN B 12 PO) Take 500 mcg by mouth daily        Cholecalciferol (VITAMIN D3 PO) Take 1,000 Units by mouth daily        Multiple Vitamins-Minerals (MULTIVITAMIN PO) Take 1 tablet by mouth daily        Omega-3 Fatty Acids (OMEGA-3 FISH OIL PO) Take 2 g by mouth daily        lancets thin MISC 1 each 3 times daily. 300 each 3     Insulin Syringe-Needle U-100 (BD INSULIN SYRINGE ULTRAFINE) 30G X 1/2\" 0.3 ML MISC 1 Syringe daily. Use one syringe  daily or as directed. 100 each prn       EXAM:    Vitals: There were no vitals taken for this visit.  BMI: There is no " height or weight on file to calculate BMI.  Height: Data Unavailable    Constitutional/ general:  Pt is in no apparent distress, appears well-nourished.  Cooperative with history and physical exam.       Vascular:  Pedal pulses are palpable bilaterally for both the DP and PT arteries.  CFT < 3 sec.  No edema.  Pedal hair growth noted.       Neuro:  Alert and oriented x 3. Coordinated gait.  Light touch sensation is diminished to the L4, L5, S1 distributions.  No evidence of neurological-based weakness, spasticity, or contracture in the lower extremities.       Derm: Full thickness ulceration to the level of the subcutaneous fat, sub right 1st metatarsal head.  Base is partially granular.  Prolific hyperkeratotic eschar around the edges.  No purulence. The wound does not probe to bone.  0.8cm diameter, round, x 0.2cm deep.      Dorsal right 4th toe ulcer appears to have healed.      Musculoskeletal:    Lower extremity muscle strength is normal.  Patient is ambulatory without an assistive device or brace .  No gross deformities.  There is b/l limited ankle dorsiflexion with knee extended.   ROM is increased with flexion of the knee b/l.   Digital contractures.     Valentino Molina DPM, FACFAS, MS    [place] Department of Podiatry/Foot & Ankle Surgery              Again, thank you for allowing me to participate in the care of your patient.        Sincerely,        Valentino Molina DPM

## 2017-11-22 ENCOUNTER — HOSPITAL ENCOUNTER (EMERGENCY)
Facility: CLINIC | Age: 57
Discharge: HOME | End: 2017-11-22
Attending: EMERGENCY MEDICINE | Admitting: EMERGENCY MEDICINE
Payer: COMMERCIAL

## 2017-11-22 ENCOUNTER — NURSE TRIAGE (OUTPATIENT)
Dept: NURSING | Facility: CLINIC | Age: 57
End: 2017-11-22

## 2017-11-22 ENCOUNTER — APPOINTMENT (OUTPATIENT)
Dept: CT IMAGING | Facility: CLINIC | Age: 57
End: 2017-11-22
Attending: EMERGENCY MEDICINE
Payer: COMMERCIAL

## 2017-11-22 VITALS
HEART RATE: 57 BPM | RESPIRATION RATE: 18 BRPM | TEMPERATURE: 97.5 F | OXYGEN SATURATION: 95 % | SYSTOLIC BLOOD PRESSURE: 167 MMHG | DIASTOLIC BLOOD PRESSURE: 86 MMHG

## 2017-11-22 DIAGNOSIS — S91.309A OPEN WOUND OF FOOT, UNSPECIFIED LATERALITY, INITIAL ENCOUNTER: ICD-10-CM

## 2017-11-22 DIAGNOSIS — R11.2 NON-INTRACTABLE VOMITING WITH NAUSEA, UNSPECIFIED VOMITING TYPE: ICD-10-CM

## 2017-11-22 DIAGNOSIS — R10.84 ABDOMINAL PAIN, GENERALIZED: ICD-10-CM

## 2017-11-22 DIAGNOSIS — E78.5 HYPERLIPIDEMIA LDL GOAL <100: ICD-10-CM

## 2017-11-22 DIAGNOSIS — E11.49 DIABETES MELLITUS TYPE 2 WITH NEUROLOGICAL MANIFESTATIONS (H): ICD-10-CM

## 2017-11-22 LAB
ALBUMIN SERPL-MCNC: 3.6 G/DL (ref 3.4–5)
ALP SERPL-CCNC: 62 U/L (ref 40–150)
ALT SERPL W P-5'-P-CCNC: 60 U/L (ref 0–70)
ANION GAP SERPL CALCULATED.3IONS-SCNC: 9 MMOL/L (ref 3–14)
AST SERPL W P-5'-P-CCNC: 59 U/L (ref 0–45)
BASOPHILS # BLD AUTO: 0.1 10E9/L (ref 0–0.2)
BASOPHILS NFR BLD AUTO: 0.9 %
BILIRUB SERPL-MCNC: 0.5 MG/DL (ref 0.2–1.3)
BUN SERPL-MCNC: 19 MG/DL (ref 7–30)
CALCIUM SERPL-MCNC: 8.9 MG/DL (ref 8.5–10.1)
CHLORIDE SERPL-SCNC: 101 MMOL/L (ref 94–109)
CO2 SERPL-SCNC: 23 MMOL/L (ref 20–32)
CREAT SERPL-MCNC: 0.88 MG/DL (ref 0.66–1.25)
DIFFERENTIAL METHOD BLD: ABNORMAL
EOSINOPHIL # BLD AUTO: 0.2 10E9/L (ref 0–0.7)
EOSINOPHIL NFR BLD AUTO: 3.4 %
ERYTHROCYTE [DISTWIDTH] IN BLOOD BY AUTOMATED COUNT: 13.5 % (ref 10–15)
GFR SERPL CREATININE-BSD FRML MDRD: 90 ML/MIN/1.7M2
GLUCOSE SERPL-MCNC: 377 MG/DL (ref 70–99)
HCT VFR BLD AUTO: 40 % (ref 40–53)
HGB BLD-MCNC: 13.2 G/DL (ref 13.3–17.7)
IMM GRANULOCYTES # BLD: 0 10E9/L (ref 0–0.4)
IMM GRANULOCYTES NFR BLD: 0.4 %
INTERPRETATION ECG - MUSE: NORMAL
LACTATE BLD-SCNC: 2 MMOL/L (ref 0.7–2)
LIPASE SERPL-CCNC: 253 U/L (ref 73–393)
LYMPHOCYTES # BLD AUTO: 1.5 10E9/L (ref 0.8–5.3)
LYMPHOCYTES NFR BLD AUTO: 22.8 %
MCH RBC QN AUTO: 28.8 PG (ref 26.5–33)
MCHC RBC AUTO-ENTMCNC: 33 G/DL (ref 31.5–36.5)
MCV RBC AUTO: 87 FL (ref 78–100)
MONOCYTES # BLD AUTO: 0.4 10E9/L (ref 0–1.3)
MONOCYTES NFR BLD AUTO: 6.1 %
NEUTROPHILS # BLD AUTO: 4.4 10E9/L (ref 1.6–8.3)
NEUTROPHILS NFR BLD AUTO: 66.4 %
NRBC # BLD AUTO: 0 10*3/UL
NRBC BLD AUTO-RTO: 0 /100
PLATELET # BLD AUTO: 132 10E9/L (ref 150–450)
POTASSIUM SERPL-SCNC: 4.7 MMOL/L (ref 3.4–5.3)
PROT SERPL-MCNC: 7.9 G/DL (ref 6.8–8.8)
RBC # BLD AUTO: 4.59 10E12/L (ref 4.4–5.9)
SODIUM SERPL-SCNC: 133 MMOL/L (ref 133–144)
TROPONIN I SERPL-MCNC: <0.015 UG/L (ref 0–0.04)
WBC # BLD AUTO: 6.7 10E9/L (ref 4–11)

## 2017-11-22 PROCEDURE — 83690 ASSAY OF LIPASE: CPT | Performed by: EMERGENCY MEDICINE

## 2017-11-22 PROCEDURE — 85025 COMPLETE CBC W/AUTO DIFF WBC: CPT | Performed by: EMERGENCY MEDICINE

## 2017-11-22 PROCEDURE — 96361 HYDRATE IV INFUSION ADD-ON: CPT

## 2017-11-22 PROCEDURE — 83605 ASSAY OF LACTIC ACID: CPT | Performed by: EMERGENCY MEDICINE

## 2017-11-22 PROCEDURE — 99285 EMERGENCY DEPT VISIT HI MDM: CPT | Mod: 25

## 2017-11-22 PROCEDURE — 25000128 H RX IP 250 OP 636: Performed by: EMERGENCY MEDICINE

## 2017-11-22 PROCEDURE — 80053 COMPREHEN METABOLIC PANEL: CPT | Performed by: EMERGENCY MEDICINE

## 2017-11-22 PROCEDURE — 96374 THER/PROPH/DIAG INJ IV PUSH: CPT | Mod: 59

## 2017-11-22 PROCEDURE — 96375 TX/PRO/DX INJ NEW DRUG ADDON: CPT

## 2017-11-22 PROCEDURE — 74177 CT ABD & PELVIS W/CONTRAST: CPT

## 2017-11-22 PROCEDURE — 84484 ASSAY OF TROPONIN QUANT: CPT | Performed by: EMERGENCY MEDICINE

## 2017-11-22 PROCEDURE — 93005 ELECTROCARDIOGRAM TRACING: CPT

## 2017-11-22 RX ORDER — IOPAMIDOL 755 MG/ML
500 INJECTION, SOLUTION INTRAVASCULAR ONCE
Status: COMPLETED | OUTPATIENT
Start: 2017-11-22 | End: 2017-11-22

## 2017-11-22 RX ORDER — ONDANSETRON 4 MG/1
4 TABLET, ORALLY DISINTEGRATING ORAL EVERY 8 HOURS PRN
Qty: 10 TABLET | Refills: 0 | Status: SHIPPED | OUTPATIENT
Start: 2017-11-22 | End: 2017-11-25

## 2017-11-22 RX ORDER — SODIUM CHLORIDE 9 MG/ML
1000 INJECTION, SOLUTION INTRAVENOUS CONTINUOUS
Status: DISCONTINUED | OUTPATIENT
Start: 2017-11-22 | End: 2017-11-22 | Stop reason: HOSPADM

## 2017-11-22 RX ORDER — KETOROLAC TROMETHAMINE 30 MG/ML
30 INJECTION, SOLUTION INTRAMUSCULAR; INTRAVENOUS ONCE
Status: COMPLETED | OUTPATIENT
Start: 2017-11-22 | End: 2017-11-22

## 2017-11-22 RX ORDER — ONDANSETRON 2 MG/ML
4 INJECTION INTRAMUSCULAR; INTRAVENOUS
Status: COMPLETED | OUTPATIENT
Start: 2017-11-22 | End: 2017-11-22

## 2017-11-22 RX ADMIN — IOPAMIDOL 100 ML: 755 INJECTION, SOLUTION INTRAVENOUS at 06:53

## 2017-11-22 RX ADMIN — KETOROLAC TROMETHAMINE 30 MG: 30 INJECTION, SOLUTION INTRAMUSCULAR at 06:11

## 2017-11-22 RX ADMIN — SODIUM CHLORIDE 1000 ML: 9 INJECTION, SOLUTION INTRAVENOUS at 06:12

## 2017-11-22 RX ADMIN — ONDANSETRON 4 MG: 2 INJECTION INTRAMUSCULAR; INTRAVENOUS at 06:10

## 2017-11-22 RX ADMIN — SODIUM CHLORIDE 65 ML: 9 INJECTION, SOLUTION INTRAVENOUS at 06:56

## 2017-11-22 ASSESSMENT — ENCOUNTER SYMPTOMS
ABDOMINAL PAIN: 1
VOMITING: 1
NAUSEA: 1
HEADACHES: 0
DIARRHEA: 0
FEVER: 0
SHORTNESS OF BREATH: 0

## 2017-11-22 NOTE — ED PROVIDER NOTES
History     Chief Complaint:  Abdominal Pain    HPI:   Crispin Rojas is a 57 year old male with a history of type II diabetes, hypertension, cerebral infarction, among others, who presents with abdominal pain. The patient reports that he normally works the late night shift. When he was on his way home early this morning, he began to experience mid-upper abdominal pain. Around 0200 when he was finally able to lay down, his pain worsened and began radiating diffusely throughout his abdomen. Subsequently, he was unable to fall asleep. Around 0300 he became nauseated and vomited. He took two TUMS to settle his stomach but still had two other episodes of emesis. Due to his pain and difficulty sleeping, he decided to visit the ED. He denies diarrhea, fever, chest pain, shortness of breath, new recent foods, or headache.  On further questioning, patient does have a history of neuropathy to LE, and has chronic wounds to his feet.  He is scheduled to follow-up with podiatry this coming Monday.  Denies discharge, redness, or fevers.  No other concerns are voiced at present.    Allergies:  Niacin  Simvastatin      Medications:    Amlodipine  Omega-3 fatty acids  Multivitamin  Vitamin D3  Vitamin B12  Coenzyme Q10  Cialis  Gabapentin  Lisinopril  Januvia  Lantus insulin  Metformin  Novolog insulin  Atorvastatin     Past Medical History:    Cerebral infarction  Chronic infections  Heartburn  Hypertension  Obesity  Type II diabetes  Kidney stones  Morbid obesity  Chronic left shoulder pain    Past Surgical History:    Foot amputation, left  Toe amputation, right  Ureter stent insertion, left x2  Shock wave lithotripsy, left  Gastrocnemius recession, right    Family History:    Arthritis- Mother  Lupus- Mother  Diabetes- Mother, Father, Sister  Cancer- Mother    Social History:  Smoking status: Never Smoker  Alcohol use: Yes -- Rarely   Marital Status:        Review of Systems   Constitutional: Negative for fever.    Respiratory: Negative for shortness of breath.    Cardiovascular: Negative for chest pain.   Gastrointestinal: Positive for abdominal pain, nausea and vomiting. Negative for diarrhea.   Neurological: Negative for headaches.   All other systems reviewed and are negative.      Physical Exam     Patient Vitals for the past 24 hrs:   BP Temp Temp src Pulse Resp SpO2   11/22/17 0715 165/77 - - - - -   11/22/17 0645 170/80 - - - - 97 %   11/22/17 0630 179/84 - - - - 94 %   11/22/17 0615 172/84 - - - - 94 %   11/22/17 0600 189/85 - - - - -   11/22/17 0556 193/88 - - - - -   11/22/17 0552 198/87 97.5  F (36.4  C) Temporal 57 18 97 %      Physical Exam:  General:                        Well-nourished                        Speaking in full sentences  Eyes:                        Conjunctiva without injection or scleral icterus                        PERRL  ENT:                        Moist mucous membranes                        Posterior oropharynx clear without erythema or exudate                        Nares patent                        Pinnae normal  Neck:                        Full ROM                        No stiffness appreciated  Resp:                        Lungs CTAB                        No crackles, wheezing or audible rubs                        Good air movement  CV:                                        Normal rate, regular rhythm                        S1 and S2 present                        No murmur, gallop or rub  GI:                        BS present                        Abdomen soft without distention                        Diffuse mild tenderness                        No palpable masses                        No guarding or rebound tenderness  Skin:                        Wound to ball of R foot                        Great toe amputations                        Erosion to lateral aspect of R foot                        No expressible discharge or drainage                        No overlying  erythema or warmth                        2+ DP pulse bilaterally  MSK:                        Moves all extremities                        No focal deformities or swelling  Neuro:                        Alert                        Answers questions appropriately                        Moves all extremities equally                        Gait stable  Psych:                        Normal affect, normal mood    Emergency Department Course     ECG (06:09:34):  Rate 54 bpm. WV interval 180. QRS duration 96. QT/QTc 456/432. P-R-T axes 46- -25-7. Sinus bradycardia. Inferior infarct, age undetermined. Abnormal ECG. Interpreted at 0617 by Juan Pablo Palomares MD.     No significant change compared to ECG dated 01/27/2016.     Imaging:  Radiographic findings were communicated with the patient who voiced understanding of the findings.    CT-scan Abdomen/Pelvis w/ contrast:  IMPRESSION:   1. No cause of acute pain identified in the abdomen or pelvis.  2. Mildly lobulated outer contour of the liver, suggestive of  cirrhosis. Additionally, there is mild splenomegaly, suggestive of  portal hypertension.  Preliminary result per radiology.      Laboratory:  Lactic Acid: 2.0  CBC: HGB 13.2 (L),  (L), o/w WNL (WBC 6.7)    CMP: Glucose 377 (H), AST 59 (H), o/w WNL (Creatinine 0.88)   Lipase: 253  Troponin I: <0.15    Interventions:  0610- Zofran 4 mg IV  0611- Toradol 30 mg IV  0612- NS 1L IV Bolus     Emergency Department Course:  Past medical records, nursing notes, and vitals reviewed.  0600: I performed an exam of the patient and obtained history, as documented above. GCS 15.     IV inserted and blood drawn.     An ECG was obtained.     0725: I rechecked the patient. Explained findings to the patient. Abdominal exam is soft without focal tenderness.    0754: I rechecked the patient. The patient was able to tolerate PO. ECG, CT, and laboratory findings and plan explained to the Patient. Patient discharged home with  instructions regarding supportive care, medications, and reasons to return. The importance of close follow-up was reviewed.      Impression & Plan      Medical Decision Making:  Crispin Rojas is a 57 year old male with a history significant for diabetes who presents to the emergency department for evaluation of abdominal pain and vomiting. Examination reveals a well-appearing male lying comfortably on the gurney. He has evidence of tenderness to palpation around the periumbilical region. A broad differential was considered including, but not limited to gastroenteritis, peptic ulcer disease, pancreatitis, bowel obstruction, colitis, biliary colic, appendicitis, referred coronary syndrome, mesenteric ischemia, among others. Work up included imaging and laboratory studies. At present, the exact etiology of his symptoms are uncertain. CT scan of the abdomen and pelvis demonstrates no cause of acute abdominal pain. Specifically, there is no evidence of bowel inflammation or obstruction. The appendix is unremarkable. The gall bladder shows some distension, but there is no evidence of acute infection and there is no acute right upper quadrant pain to suggest cholecystitis. The CT did show abnormal liver contours suggestive of cirrhosis, portal hypertension, and splenomegaly. This was discussed with the patient. He was also formed of the incidentally noted 3 mm non-obstruction calculus to the left kidney; this was unlikely to cause his symptoms. Laboratory studies reveal mild thrombocytopenia, though grossly within range of previous. His blood glucose is elevated at 377, but there is no evidence of acidosis to suggest DKA. AST is elevated likely secondary to mild cirrhosis on CT. Lactic acid is 2.0, arguing against mesenteric ischemia. ECG demonstrates sinus rhythm with no evidence of acute ischemia, similar to previous and his troponin is undetectable. In the absense of shortness of breath or chest pain, I feel that his  symptoms are unlikely to be related to coronary ischemia. Similarly, he did not have a headache or neurological deficits to suggest CNS cause of his vomiting. He was provided the above analgesics with improvement in his symptoms. Incidentally, he does have wounds to the bottoms of his feet bilaterally. These are chronic in nature and do not show signs of acute infection. He follows up with podiatry on 11/27/2017. I recommended close follow up and monitoring for signs of infection. I do not feel that the patient warrants admission at this time. He is felt stable for discharge home with Zofran for nausea and a bland diet. He should follow up with his PCP in 1-2 days and return to the ED for any worsening symptoms such as severe pain, uncontrollable vomiting, fever, or for any other concerns. All questions were answered prior to discharge.     Diagnosis:    ICD-10-CM    1. Abdominal pain, generalized R10.84 Lactic acid whole blood   2. Non-intractable vomiting with nausea, unspecified vomiting type R11.2    3. Open wound of foot, unspecified laterality, initial encounter S91.309A        Disposition:  Discharged to home with Zofran.     Discharge Medications:  New Prescriptions    ONDANSETRON (ZOFRAN ODT) 4 MG ODT TAB    Take 1 tablet (4 mg) by mouth every 8 hours as needed for nausea     Rosa Mckoy  11/22/2017   [facility] EMERGENCY DEPARTMENT    I, Rosa Mckoy, am serving as a scribe at 6:00 AM on 11/22/2017 to document services personally performed by Juan Pablo Palomares MD based on my observations and the provider's statements to me.       Juan Pablo Palomares MD  11/22/17 [id number]

## 2017-11-22 NOTE — ED AVS SNAPSHOT
[facility] Emergency Department    [address]    [facility] [address]    Phone:  [phone]    Fax:  [phone]                                       Crispin Rojas   MRN: [id number]    Department:  [facility] Emergency Department   Date of Visit:  11/22/2017           After Visit Summary Signature Page     I have received my discharge instructions, and my questions have been answered. I have discussed any challenges I see with this plan with the nurse or doctor.    ..........................................................................................................................................  Patient/Patient Representative Signature      ..........................................................................................................................................  Patient Representative Print Name and Relationship to Patient    ..................................................               ................................................  Date                                            Time    ..........................................................................................................................................  Reviewed by Signature/Title    ...................................................              ..............................................  Date                                                            Time

## 2017-11-22 NOTE — TELEPHONE ENCOUNTER
"  Reason for Disposition    [1] SEVERE pain (e.g., excruciating) AND [2] present > 1 hour    Additional Information    Negative: Shock suspected (e.g., cold/pale/clammy skin, too weak to stand, low BP, rapid pulse)    Negative: Difficult to awaken or acting confused  (e.g., disoriented, slurred speech)    Negative: Passed out (i.e., lost consciousness, collapsed and was not responding)    Negative: Sounds like a life-threatening emergency to the triager    Negative: Chest pain    Negative: Pain is mainly in upper abdomen  (if needed ask: \"is it mainly above the belly button?\")    Negative: Followed an abdomen (stomach) injury    Answer Assessment - Initial Assessment Questions  1. LOCATION: \"Where does it hurt?\"       Lower mid abdomen  2. RADIATION: \"Does the pain shoot anywhere else?\" (e.g., chest, back)      no  3. ONSET: \"When did the pain begin?\" (Minutes, hours or days ago)       0200  4. SUDDEN: \"Gradual or sudden onset?\"      Sudden and is getting worse  5. PATTERN \"Does the pain come and go, or is it constant?\"     - If constant: \"Is it getting better, staying the same, or worsening?\"       (Note: Constant means the pain never goes away completely; most serious pain is constant and it progresses)      - If intermittent: \"How long does it last?\" \"Do you have pain now?\"      (Note: Intermittent means the pain goes away completely between bouts)      constant  6. SEVERITY: \"How bad is the pain?\"  (e.g., Scale 1-10; mild, moderate, or severe)     - MILD (1-3): doesn't interfere with normal activities, abdomen soft and not tender to touch      - MODERATE (4-7): interferes with normal activities or awakens from sleep, tender to touch      - SEVERE (8-10): excruciating pain, doubled over, unable to do any normal activities        severe  7. RECURRENT SYMPTOM: \"Have you ever had this type of abdominal pain before?\" If so, ask: \"When was the last time?\" and \"What happened that time?\"       no  8. CAUSE: \"What do you " "think is causing the abdominal pain?\"      Not sure  9. RELIEVING/AGGRAVATING FACTORS: \"What makes it better or worse?\" (e.g., movement, antacids, bowel movement)      no  10. OTHER SYMPTOMS: \"Has there been any vomiting, diarrhea, constipation, or urine problems?\"        vomited    Protocols used: ABDOMINAL PAIN - MALE-ADULT-AH    "

## 2017-11-22 NOTE — ED AVS SNAPSHOT
[facility] [place] [facility]    [address]    [facility] [address]    Phone:  [phone]    Fax:  [phone]                                       Crispin Rojas   MRN: [id number]    Department:  [facility] [place] [facility]   Date of Visit:  11/22/2017           Patient Information     Date Of Birth          1960        Your diagnoses for this visit were:     Abdominal pain, generalized     Non-intractable vomiting with nausea, unspecified vomiting type     Open wound of foot, unspecified laterality, initial encounter        You were seen by Juan Pablo Palomares MD.      Follow-up Information     Follow up with Aden Lopez MD. Schedule an appointment as soon as possible for a visit in 2 days.    Specialty:  Family Practice    Contact information:    [address] [facility] [id number] [phone]          Follow up with [facility] [place] [facility].    Specialty:  EMERGENCY MEDICINE    Why:  If symptoms worsen    Contact information:    [address]  [facility] [address] [phone]        Discharge Instructions       Follow-up:  Please follow-up with PCP and podiatry in 2-3 days for re-evaluation and discussion of your visit to the emergency department today.    Home treatments:  Recommended home therapies include rest, fluids, bland diet, zofran, and close monitoring of symptoms.    New prescriptions:  Zofran    Return precautions:  Warning signs which should prompt you to return to the ER include worsening nausea, vomiting, abdominal pain, fevers, or any other new or troubling symptoms.  We are always happy to see you again.    Discharge Instructions  Vomiting    You have been seen today for vomiting (throwing up). This is usually caused by a virus, but some bacteria, parasites, medicines or other medical conditions can cause similar symptoms. At this time your provider does not find that your  vomiting is a sign of anything dangerous or life-threatening. However, sometimes the signs of serious illness do not show up right away. If you have new or worse symptoms, you may need to be seen again in the Emergency Department or by your primary provider. Remember that serious problems like appendicitis can start as vomiting.    Generally, every Emergency Department visit should have a follow-up clinic visit with either a primary or a specialty clinic/provider. Please follow-up as instructed by your emergency provider today.    Return to the Emergency Department if:    You keep vomiting and you are not able to keep liquids down.     You feel you are getting dehydrated, such as being very thirsty, not urinating (peeing) at least every 8-12 hours, or feeling faint or lightheaded.     You develop a new fever, or your fever continues for more than 2 days.     You have abdominal (belly pain) that seems worse than cramps, is in one spot, or is getting worse over time. Appendicitis usually causes pain in the right lower abdomen (to the right and below your belly button) so watch for pain in this location.    You have blood in your vomit or stools.     You feel very weak.    You are not starting to improve within 24 hours of your visit here.     What can I do to help myself?    The most important thing to do is to drink clear liquids. If you have been vomiting a lot, it is best to have only small, frequent sips of liquids. Drinking too much at once may cause more vomiting. If you are vomiting often, you must replace minerals, sodium and potassium lost with your illness. Pedialyte  is the best available rehydration liquid but some find that it doesn t taste good so sports drinks are an alterative. You can also drink clear liquids such as water, weak tea, apple juice, and 7-Up . Avoid acid liquids (orange), caffeine (coffee) or alcohol. Do not drink milk until you no longer have diarrhea (loose stools).     After liquids  are staying down, you may start eating mild foods. Soda crackers, toast, plain noodles, gelatin, applesauce and bananas are good first choices. Avoid foods that have acid, are spicy, fatty or have a lot of fiber (such as meats, coarse grains, vegetables). You may start eating these foods again in about 3 days when you are better.     Sometimes treatment includes prescription medicine to prevent nausea (sick to your stomach) and vomiting. If your provider prescribes these for you, take them as directed.     Do not take ibuprofen, naproxen, or other nonsteroidal anti-inflammatory (NSAID) medicines without checking with your healthcare provider.     If you were given a prescription for medicine here today, be sure to read all of the information (including the package insert) that comes with your prescription.  This will include important information about the medicine, its side effects, and any warnings that you need to know about.  The pharmacist who fills the prescription can provide more information and answer questions you may have about the medicine.  If you have questions or concerns that the pharmacist cannot address, please call or return to the Emergency Department.     Remember that you can always come back to the Emergency Department if you are not able to see your regular provider in the amount of time listed above, if you get any new symptoms, or if there is anything that worries you.        Future Appointments        Provider Department Dept Phone Center    11/27/2017 7:00 AM Valentino Molina DPM [facility] [phone]       24 Hour Appointment Hotline       To make an appointment at any [facility], call [phone] ([phone]). If you don't have a family doctor or clinic, we will help you find one. [facility] are conveniently located to serve the needs of you and your family.             Review of your medicines      START taking        Dose / Directions  "Last dose taken    ondansetron 4 MG ODT tab   Commonly known as:  ZOFRAN ODT   Dose:  4 mg   Quantity:  10 tablet        Take 1 tablet (4 mg) by mouth every 8 hours as needed for nausea   Refills:  0          Our records show that you are taking the medicines listed below. If these are incorrect, please call your family doctor or clinic.        Dose / Directions Last dose taken    amLODIPine 5 MG tablet   Commonly known as:  NORVASC   Quantity:  90 tablet        TAKE 1 TABLET(5 MG) BY MOUTH DAILY   Refills:  0        atorvastatin 40 MG tablet   Commonly known as:  LIPITOR   Quantity:  30 tablet        TAKE 1 TABLET BY MOUTH EVERY DAY   Refills:  0        BD insulin syringe ultrafine 30G X 1/2\" 0.3 ML   Dose:  1 Syringe   Quantity:  100 each   Generic drug:  insulin syringe-needle U-100        1 Syringe daily. Use one syringe  daily or as directed.   Refills:  prn        blood glucose monitoring meter device kit   Commonly known as:  no brand specified   Quantity:  1 kit        Use to test blood sugar 3 times daily or as directed.   Refills:  0        blood glucose monitoring test strip   Commonly known as:  no brand specified   Quantity:  300 strip        Use to test blood sugars 3 times daily or as directed   Refills:  3        CIALIS 5 MG tablet   Quantity:  30 tablet   Generic drug:  tadalafil        TAKE 1 TABLET BY MOUTH EVERY DAY AS NEEDED   Refills:  0        Co-Enzyme Q10 100 MG Caps   Dose:  100 mg        Take 100 mg by mouth daily   Refills:  0        gabapentin 100 MG capsule   Commonly known as:  NEURONTIN   Dose:  300 mg   Indication:  Take 3 capsules daily at bedtime        Take 300 mg by mouth At Bedtime   Refills:  0        insulin glargine 100 UNIT/ML injection   Commonly known as:  LANTUS SOLOSTAR   Quantity:  60 mL        Take up to 50 units BID   Refills:  1        ketoconazole 2 % shampoo   Commonly known as:  NIZORAL   Quantity:  120 mL        Apply to the affected area and wash off after 5 " minutes.   Refills:  11        lisinopril 20 MG tablet   Commonly known as:  PRINIVIL/ZESTRIL   Dose:  20 mg   Quantity:  90 tablet        Take 1 tablet (20 mg) by mouth daily   Refills:  3        metFORMIN 500 MG 24 hr tablet   Commonly known as:  GLUCOPHAGE-XR   Quantity:  360 tablet        TAKE 2 TABLETS BY MOUTH TWICE DAILY WITH MEALS   Refills:  0        MULTIVITAMIN PO   Dose:  1 tablet        Take 1 tablet by mouth daily   Refills:  0        NovoLOG FLEXPEN 100 UNIT/ML injection   Quantity:  30 mL   Generic drug:  insulin aspart        INJECT 10 TO 35 UNITS UNDER THE SKIN THREE TIMES DAILY   Refills:  0        OMEGA-3 FISH OIL PO   Dose:  2 g        Take 2 g by mouth daily   Refills:  0        order for DME   Quantity:  1 Device        CAM boot, Large   Refills:  0        sitagliptin 100 MG tablet   Commonly known as:  JANUVIA   Dose:  100 mg   Quantity:  90 tablet        Take 1 tablet (100 mg) by mouth daily   Refills:  3        thin lancets   Commonly known as:  NO BRAND SPECIFIED   Dose:  1 each   Quantity:  300 each        1 each 3 times daily.   Refills:  3        VITAMIN B 12 PO   Dose:  500 mcg        Take 500 mcg by mouth daily   Refills:  0        VITAMIN D3 PO   Dose:  1000 Units        Take 1,000 Units by mouth daily   Refills:  0                Prescriptions were sent or printed at these locations (1 Prescription)                   Other Prescriptions                Printed at Department/Unit printer (1 of 1)         ondansetron (ZOFRAN ODT) 4 MG ODT tab                Procedures and tests performed during your visit     CBC with platelets differential    CT Abdomen Pelvis w Contrast    Comprehensive metabolic panel    EKG 12 lead    Give 20 ounces of water 15 minutes before CT of abdomen    Lactic acid whole blood    Lipase    Troponin I      Orders Needing Specimen Collection     None      Pending Results     Date and Time Order Name Status Description    11/22/2017 0602 CT Abdomen Pelvis w  Contrast Preliminary             Pending Culture Results     No orders found from 11/20/2017 to 11/23/2017.            Pending Results Instructions     If you had any lab results that were not finalized at the time of your Discharge, you can call the ED Lab Result RN at [phone]. You will be contacted by this team for any positive Lab results or changes in treatment. The nurses are available 7 days a week from 10A to 6:30P.  You can leave a message 24 hours per day and they will return your call.        Test Results From Your Hospital Stay        11/22/2017  6:15 AM      Component Results     Component Value Ref Range & Units Status    WBC 6.7 4.0 - 11.0 10e9/L Final    RBC Count 4.59 4.4 - 5.9 10e12/L Final    Hemoglobin 13.2 (L) 13.3 - 17.7 g/dL Final    Hematocrit 40.0 40.0 - 53.0 % Final    MCV 87 78 - 100 fl Final    MCH 28.8 26.5 - 33.0 pg Final    MCHC 33.0 31.5 - 36.5 g/dL Final    RDW 13.5 10.0 - 15.0 % Final    Platelet Count 132 (L) 150 - 450 10e9/L Final    Diff Method Automated Method  Final    % Neutrophils 66.4 % Final    % Lymphocytes 22.8 % Final    % Monocytes 6.1 % Final    % Eosinophils 3.4 % Final    % Basophils 0.9 % Final    % Immature Granulocytes 0.4 % Final    Nucleated RBCs 0 0 /100 Final    Absolute Neutrophil 4.4 1.6 - 8.3 10e9/L Final    Absolute Lymphocytes 1.5 0.8 - 5.3 10e9/L Final    Absolute Monocytes 0.4 0.0 - 1.3 10e9/L Final    Absolute Eosinophils 0.2 0.0 - 0.7 10e9/L Final    Absolute Basophils 0.1 0.0 - 0.2 10e9/L Final    Abs Immature Granulocytes 0.0 0 - 0.4 10e9/L Final    Absolute Nucleated RBC 0.0  Final         11/22/2017  6:36 AM      Component Results     Component Value Ref Range & Units Status    Sodium 133 133 - 144 mmol/L Final    Potassium 4.7 3.4 - 5.3 mmol/L Final    Specimen slightly hemolyzed, potassium may be falsely elevated    Chloride 101 94 - 109 mmol/L Final    Carbon Dioxide 23 20 - 32 mmol/L Final    Anion Gap 9 3 - 14 mmol/L Final    Glucose 377  (H) 70 - 99 mg/dL Final    Urea Nitrogen 19 7 - 30 mg/dL Final    Creatinine 0.88 0.66 - 1.25 mg/dL Final    GFR Estimate 90 >60 mL/min/1.7m2 Final    Non  GFR Calc    GFR Estimate If Black >90 >60 mL/min/1.7m2 Final    African American GFR Calc    Calcium 8.9 8.5 - 10.1 mg/dL Final    Bilirubin Total 0.5 0.2 - 1.3 mg/dL Final    Albumin 3.6 3.4 - 5.0 g/dL Final    Protein Total 7.9 6.8 - 8.8 g/dL Final    Alkaline Phosphatase 62 40 - 150 U/L Final    ALT 60 0 - 70 U/L Final    AST 59 (H) 0 - 45 U/L Final         11/22/2017  6:36 AM      Component Results     Component Value Ref Range & Units Status    Lipase 253 73 - 393 U/L Final         11/22/2017  6:36 AM      Component Results     Component Value Ref Range & Units Status    Troponin I ES <0.015 0.000 - 0.045 ug/L Final    The 99th percentile for upper reference range is 0.045 ug/L.  Troponin values   in the range of 0.045 - 0.120 ug/L may be associated with risks of adverse   clinical events.           11/22/2017  7:23 AM      Narrative     CT ABDOMEN AND PELVIS WITH CONTRAST  11/22/2017 7:02 AM     HISTORY: Diffuse abdominal pain and vomiting.    COMPARISON: 8/20/2016.    TECHNIQUE: Following the uneventful administration of 100 mL  Isovue-370 intravenous contrast, helical sections were acquired from  the top of the diaphragm through the pubic symphysis. Coronal  reconstructions were generated. Radiation dose for this scan was  reduced using automated exposure control, adjustment of the mA and/or  kV according to the patient's size, or iterative reconstruction  technique.    FINDINGS:   Abdomen: Mildly lobulated outer contour of the liver. Mild  splenomegaly. The spleen measures 13.1 cm in craniocaudal dimension.  The pancreas, adrenal glands and right kidney are unremarkable. 0.3 cm  nonobstructing calculus in the upper pole of the left kidney. The  gallbladder is mildly distended, but otherwise unremarkable in  appearance. No pericholecystic  inflammation. No enlarged lymph nodes  or free fluid in the upper abdomen. Atherosclerotic calcification in  the abdominal aorta.    Scan through the lower chest is unremarkable.    Pelvis: The small and large bowel are normal in caliber. Redundant  sigmoid colon. The appendix is unremarkable. No bowel wall thickening,  pneumatosis or free intraperitoneal gas. No enlarged lymph nodes or  free fluid in the pelvis. A few nonspecific borderline-enlarged lymph  nodes in both sides of the pelvis are unchanged since 8/20/2016.        Impression     IMPRESSION:   1. No cause of acute pain identified in the abdomen or pelvis.  2. Mildly lobulated outer contour of the liver, suggestive of  cirrhosis. Additionally, there is mild splenomegaly, suggestive of  portal hypertension.         11/22/2017  7:50 AM      Component Results     Component Value Ref Range & Units Status    Lactic Acid 2.0 0.7 - 2.0 mmol/L Final                Clinical Quality Measure: Blood Pressure Screening     Your blood pressure was checked while you were in the emergency department today. The last reading we obtained was  BP: 167/86 . Please read the guidelines below about what these numbers mean and what you should do about them.  If your systolic blood pressure (the top number) is less than 120 and your diastolic blood pressure (the bottom number) is less than 80, then your blood pressure is normal. There is nothing more that you need to do about it.  If your systolic blood pressure (the top number) is 120-139 or your diastolic blood pressure (the bottom number) is 80-89, your blood pressure may be higher than it should be. You should have your blood pressure rechecked within a year by a primary care provider.  If your systolic blood pressure (the top number) is 140 or greater or your diastolic blood pressure (the bottom number) is 90 or greater, you may have high blood pressure. High blood pressure is treatable, but if left untreated over time it  can put you at risk for heart attack, stroke, or kidney failure. You should have your blood pressure rechecked by a primary care provider within the next 4 weeks.  If your provider in the emergency department today gave you specific instructions to follow-up with your doctor or provider even sooner than that, you should follow that instruction and not wait for up to 4 weeks for your follow-up visit.        Thank you for choosing [facility]       Thank you for choosing [facility] for your care. Our goal is always to provide you with excellent care. Hearing back from our patients is one way we can continue to improve our services. Please take a few minutes to complete the written survey that you may receive in the mail after you visit with us. Thank you!        [email or web address] Information     [facility] gives you secure access to your electronic health record. If you see a primary care provider, you can also send messages to your care team and make appointments. If you have questions, please call your primary care clinic.  If you do not have a primary care provider, please call [phone] and they will assist you.        Care EveryWhere ID     This is your Care EveryWhere ID. This could be used by other organizations to access your [facility] medical records  [id number]        Equal Access to Services     ELI MARTÍNEZ : Nehemias Kan, gio martin, blanca toro. So [facility] [phone].    ATENCIÓN: Si habla español, tiene a gomez disposición servicios gratuitos de asistencia lingüística. Michela al [phone].    We comply with applicable federal civil rights laws and [place] laws. We do not discriminate on the basis of race, color, national origin, age, disability, sex, sexual orientation, or gender identity.            After Visit Summary       This is your record. Keep this with you and show to your community pharmacist(s) and doctor(s) at your next visit.

## 2017-11-22 NOTE — DISCHARGE INSTRUCTIONS
Follow-up:  Please follow-up with PCP and podiatry in 2-3 days for re-evaluation and discussion of your visit to the emergency department today.    Home treatments:  Recommended home therapies include rest, fluids, bland diet, zofran, and close monitoring of symptoms.    New prescriptions:  Zofran    Return precautions:  Warning signs which should prompt you to return to the ER include worsening nausea, vomiting, abdominal pain, fevers, or any other new or troubling symptoms.  We are always happy to see you again.    Discharge Instructions  Vomiting    You have been seen today for vomiting (throwing up). This is usually caused by a virus, but some bacteria, parasites, medicines or other medical conditions can cause similar symptoms. At this time your provider does not find that your vomiting is a sign of anything dangerous or life-threatening. However, sometimes the signs of serious illness do not show up right away. If you have new or worse symptoms, you may need to be seen again in the Emergency Department or by your primary provider. Remember that serious problems like appendicitis can start as vomiting.    Generally, every Emergency Department visit should have a follow-up clinic visit with either a primary or a specialty clinic/provider. Please follow-up as instructed by your emergency provider today.    Return to the Emergency Department if:    You keep vomiting and you are not able to keep liquids down.     You feel you are getting dehydrated, such as being very thirsty, not urinating (peeing) at least every 8-12 hours, or feeling faint or lightheaded.     You develop a new fever, or your fever continues for more than 2 days.     You have abdominal (belly pain) that seems worse than cramps, is in one spot, or is getting worse over time. Appendicitis usually causes pain in the right lower abdomen (to the right and below your belly button) so watch for pain in this location.    You have blood in your vomit or  stools.     You feel very weak.    You are not starting to improve within 24 hours of your visit here.     What can I do to help myself?    The most important thing to do is to drink clear liquids. If you have been vomiting a lot, it is best to have only small, frequent sips of liquids. Drinking too much at once may cause more vomiting. If you are vomiting often, you must replace minerals, sodium and potassium lost with your illness. Pedialyte  is the best available rehydration liquid but some find that it doesn t taste good so sports drinks are an alterative. You can also drink clear liquids such as water, weak tea, apple juice, and 7-Up . Avoid acid liquids (orange), caffeine (coffee) or alcohol. Do not drink milk until you no longer have diarrhea (loose stools).     After liquids are staying down, you may start eating mild foods. Soda crackers, toast, plain noodles, gelatin, applesauce and bananas are good first choices. Avoid foods that have acid, are spicy, fatty or have a lot of fiber (such as meats, coarse grains, vegetables). You may start eating these foods again in about 3 days when you are better.     Sometimes treatment includes prescription medicine to prevent nausea (sick to your stomach) and vomiting. If your provider prescribes these for you, take them as directed.     Do not take ibuprofen, naproxen, or other nonsteroidal anti-inflammatory (NSAID) medicines without checking with your healthcare provider.     If you were given a prescription for medicine here today, be sure to read all of the information (including the package insert) that comes with your prescription.  This will include important information about the medicine, its side effects, and any warnings that you need to know about.  The pharmacist who fills the prescription can provide more information and answer questions you may have about the medicine.  If you have questions or concerns that the pharmacist cannot address, please call or  return to the Emergency Department.     Remember that you can always come back to the Emergency Department if you are not able to see your regular provider in the amount of time listed above, if you get any new symptoms, or if there is anything that worries you.

## 2017-11-24 NOTE — TELEPHONE ENCOUNTER
Requested Prescriptions   Pending Prescriptions Disp Refills       Aorvastatin (LIPITOR) 40 MG tablet [Pharmacy Med Name: ATORVASTATIN 40MG TABLETS]  .RXDETAIL 30 tablet 0     Sig: TAKE 1 TABLET BY MOUTH EVERY DAY    Statins Protocol Passed    11/22/2017  1:48 PM       Passed - LDL on file in past 12 months    Recent Labs   Lab Test  03/31/17   1006   LDL  53            Passed - No abnormal creatine kinase in past 12 months    No lab results found.         Passed - Recent or future visit with authorizing provider    Patient had office visit in the last year or has a visit in the next 30 days with authorizing provider.  See chart review.              Passed - Patient is age 18 or older

## 2017-11-26 ENCOUNTER — TELEPHONE (OUTPATIENT)
Dept: FAMILY MEDICINE | Facility: CLINIC | Age: 57
End: 2017-11-26

## 2017-11-26 ENCOUNTER — NURSE TRIAGE (OUTPATIENT)
Dept: NURSING | Facility: CLINIC | Age: 57
End: 2017-11-26

## 2017-11-26 NOTE — TELEPHONE ENCOUNTER
Called about atorvastatin refill. Wondering why this hasn't been done.  Last fill was a month ago and he is out.  There was a message to pharmacy stating Crispin needed OV before any further refills when the last one was done in October.  Crispin works for [employer] and his schedule is very busy right now.  He wasn't aware he needed to be seen again.  He's going to check his work schedule tomorrow as he'd like to get his visit and labs done asap.  Please call Crispin and advise him as to if he can get this refilled again. He stated it's okay to leave him a detailed message on his voice mail if he doesn't answer.  Routed: P [id number] AHSAN KAUR, RN [facility]

## 2017-11-26 NOTE — TELEPHONE ENCOUNTER
Called about atorvastatin refill. Wondering why this hasn't been done.  Last fill was a month ago and he is out.  There was a message to pharmacy stating Crispin needed OV before any further refills when the last one was done in October.  Crispin works for [employer] and his schedule is very busy right now.  He wasn't aware he needed to be seen again.  He's going to check his work schedule tomorrow as he'd like to get his visit and labs done asap.  Please call Crispin and advise him as to if he can get this refilled again. He stated it's okay to leave him a detailed message on his voice mail if he doesn't answer.  Routed: P [id number] AHSAN KAUR, RN [facility]

## 2017-11-27 ENCOUNTER — OFFICE VISIT (OUTPATIENT)
Dept: PODIATRY | Facility: CLINIC | Age: 57
End: 2017-11-27
Payer: COMMERCIAL

## 2017-11-27 ENCOUNTER — RADIANT APPOINTMENT (OUTPATIENT)
Dept: GENERAL RADIOLOGY | Facility: CLINIC | Age: 57
End: 2017-11-27
Attending: PODIATRIST
Payer: COMMERCIAL

## 2017-11-27 VITALS
SYSTOLIC BLOOD PRESSURE: 140 MMHG | HEIGHT: 71 IN | BODY MASS INDEX: 35 KG/M2 | DIASTOLIC BLOOD PRESSURE: 70 MMHG | WEIGHT: 250 LBS

## 2017-11-27 DIAGNOSIS — L97.412: ICD-10-CM

## 2017-11-27 DIAGNOSIS — E11.42 TYPE 2 DIABETES MELLITUS WITH PERIPHERAL NEUROPATHY (H): Primary | ICD-10-CM

## 2017-11-27 LAB
CRP SERPL-MCNC: 3.9 MG/L (ref 0–8)
ERYTHROCYTE [SEDIMENTATION RATE] IN BLOOD BY WESTERGREN METHOD: 23 MM/H (ref 0–20)
WBC # BLD AUTO: 6.1 10E9/L (ref 4–11)

## 2017-11-27 PROCEDURE — 85048 AUTOMATED LEUKOCYTE COUNT: CPT | Performed by: PODIATRIST

## 2017-11-27 PROCEDURE — 73630 X-RAY EXAM OF FOOT: CPT | Mod: RT

## 2017-11-27 PROCEDURE — 85652 RBC SED RATE AUTOMATED: CPT | Performed by: PODIATRIST

## 2017-11-27 PROCEDURE — 36415 COLL VENOUS BLD VENIPUNCTURE: CPT | Performed by: PODIATRIST

## 2017-11-27 PROCEDURE — 11042 DBRDMT SUBQ TIS 1ST 20SQCM/<: CPT | Performed by: PODIATRIST

## 2017-11-27 PROCEDURE — 86140 C-REACTIVE PROTEIN: CPT | Performed by: PODIATRIST

## 2017-11-27 PROCEDURE — 99213 OFFICE O/P EST LOW 20 MIN: CPT | Mod: 25 | Performed by: PODIATRIST

## 2017-11-27 RX ORDER — ATORVASTATIN CALCIUM 40 MG/1
TABLET, FILM COATED ORAL
Qty: 90 TABLET | Refills: 1 | Status: SHIPPED | OUTPATIENT
Start: 2017-11-27 | End: 2018-05-20

## 2017-11-27 NOTE — TELEPHONE ENCOUNTER
Prescription approved per [facility]ill [facility].  June Tohmas RN, BSN

## 2017-11-27 NOTE — LETTER
11/27/2017         RE: Crispin Rojas  [id number] [address] [id number]        Dear Colleague,    Thank you for referring your patient, Crispin Rojas, to the [facility]. Please see a copy of my visit note below.    ASSESSMENT/PLAN:    Encounter Diagnoses   Name Primary?     Type 2 diabetes mellitus with peripheral neuropathy (H) Yes     Ulcer of midfoot, right, with fat layer exposed (H)        We reviewed wound care instructions, purpose of and need for offloading,  risk of infection and hospitalization.     He was again offered crutches. Despite reported use of the CAM walker, the wound persists and there is a new wound.  He presented to clinic in athletic shoes.    He explained that he is not able to use crutches at work. He is a  for new hires on the floor, working up to 10 hours shifts.  He is not opposed to crutches, but prefers to wait a few weeks.  He works for [employer] and it is a very busy time of the year.      I told him it is my job to continue to review wound care options and recommend appropriate offloading.  It is also my job to make sure he realizes a real risk of infection which could potentially lead to hospitalization and surgery.  He will hear this from me at every visit.  I told him that non weight bearing is likely needed, if he has a chance to heal the ulcers.  I also asked him how his diabetes control is and explained that poor control can be a barrier to healing. He reported that it has not been ideal and is seeing his primary care physician in the near future.      He is to monitor for redness, drainage, swelling, pain and seek immediate medical attention if his foot is worsening.     Baseline x-ray and labs ordered.     Follow up in 3-4 weeks.    Excisional debridment procedure discussed.  Goals of removing non-viable tissue, evaluating full extent of wound, and promoting wound healing were explained.  Pt provided verbal and written  "consent.  A \"Time Out\" was called.     Using a sterile #15 blade and tissue nippers, excisional debridment of the right foot ulcer was performed, full-thickness to the level of, and including, the subcutaneous tissue.  The hyperkeratotic eschar was removed, by excising skin edges back to healthy, bleeding tissue .  Other non-viable tissue was excised. The ulcer base was scraped to remove bioburden and promote healing.  There was moderate bleeding with the procedure. No anesthesia needed due to peripheral neuropathy.  Sterile dressing applied.    Weight management plan: Patient was referred to their PCP to discuss a diet and exercise plan.    Valentino Molina DPM, FACFAS, MS    [place] Department of Podiatry/Foot & Ankle Surgery      ____________________________________________________________________    HPI:       Chief Complaint: follow up for ulceration, right foot;  Type 2 diabetes with peripheral neuropathy  Onset of problem: three months  History well documented in previous visits.  No new concerns.  He uses a CAM walker   Reports a new ulceration that he associates from wearing a compression sock one day.  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  Because of this work and the time of year, he says that he is not able to use crutches.  \"Can we wait a few weeks.\"      *  Past Medical History:   Diagnosis Date     Cerebral infarction (H)      Chronic infection      H/O heartburn      History of heartburn      Hypertension      Numbness and tingling      Obesity      Type II or unspecified type diabetes mellitus without mention of complication, not stated as uncontrolled    *  *  Past Surgical History:   Procedure Laterality Date     AMPUTATE FOOT Left 8/18/2016    Procedure: AMPUTATE FOOT;  Surgeon: Jack Younger DPM;  Location: [facility] OR     AMPUTATE TOE(S) Right 2/1/2016    Procedure: AMPUTATE TOE(S);  Surgeon: Rachelle Manriquez DPM, " Pod;  Location: [facility] OR     ANGIOGRAM       COLONOSCOPY       COMBINED CYSTOSCOPY, RETROGRADES, URETEROSCOPY, INSERT STENT Left 8/21/2016    Procedure: COMBINED CYSTOSCOPY, RETROGRADES, URETEROSCOPY, INSERT STENT;  Surgeon: Artemio Valenzuela MD;  Location: [facility] OR     COMBINED CYSTOSCOPY, RETROGRADES, URETEROSCOPY, LASER HOLMIUM LITHOTRIPSY URETER(S), INSERT STENT Left 10/3/2016    Procedure: COMBINED CYSTOSCOPY, RETROGRADES, URETEROSCOPY, LASER HOLMIUM LITHOTRIPSY URETER(S), INSERT STENT;  Surgeon: Artemio Valenzuela MD;  Location: [facility] OR     EXTRACORPOREAL SHOCK WAVE LITHOTRIPSY (ESWL) Left 9/8/2016    Procedure: EXTRACORPOREAL SHOCK WAVE LITHOTRIPSY (ESWL);  Surgeon: Artemio Valenzuela MD;  Location: [facility] OR     RECESSION GASTROCNEMIUS Right 2/1/2016    Procedure: RECESSION GASTROCNEMIUS;  Surgeon: Rachelle Manriquez, DPM, Pod;  Location: [facility] OR   *  *  Current Outpatient Prescriptions   Medication Sig Dispense Refill     amLODIPine (NORVASC) 5 MG tablet TAKE 1 TABLET(5 MG) BY MOUTH DAILY 90 tablet 0     atorvastatin (LIPITOR) 40 MG tablet TAKE 1 TABLET BY MOUTH EVERY DAY 30 tablet 0     NOVOLOG FLEXPEN 100 UNIT/ML soln INJECT 10 TO 35 UNITS UNDER THE SKIN THREE TIMES DAILY 30 mL 0     order for DME CAM boot, Large 1 Device 0     metFORMIN (GLUCOPHAGE-XR) 500 MG 24 hr tablet TAKE 2 TABLETS BY MOUTH TWICE DAILY WITH MEALS 360 tablet 0     insulin glargine (LANTUS SOLOSTAR) 100 UNIT/ML injection Take up to 50 units BID 60 mL 1     sitagliptin (JANUVIA) 100 MG tablet Take 1 tablet (100 mg) by mouth daily 90 tablet 3     lisinopril (PRINIVIL/ZESTRIL) 20 MG tablet Take 1 tablet (20 mg) by mouth daily 90 tablet 3     gabapentin (NEURONTIN) 100 MG capsule Take 300 mg by mouth At Bedtime       CIALIS 5 MG tablet TAKE 1 TABLET BY MOUTH EVERY DAY AS NEEDED 30 tablet 0     blood glucose monitoring (NO BRAND SPECIFIED) meter device kit Use to test blood sugar 3 times daily or as directed. 1 kit 0     blood glucose  "monitoring (NO BRAND SPECIFIED) test strip Use to test blood sugars 3 times daily or as directed 300 strip 3     ketoconazole (NIZORAL) 2 % shampoo Apply to the affected area and wash off after 5 minutes. 120 mL 11     Co-Enzyme Q10 100 MG CAPS Take 100 mg by mouth daily        Cyanocobalamin (VITAMIN B 12 PO) Take 500 mcg by mouth daily        Cholecalciferol (VITAMIN D3 PO) Take 1,000 Units by mouth daily        Multiple Vitamins-Minerals (MULTIVITAMIN PO) Take 1 tablet by mouth daily        Omega-3 Fatty Acids (OMEGA-3 FISH OIL PO) Take 2 g by mouth daily        lancets thin MISC 1 each 3 times daily. 300 each 3     Insulin Syringe-Needle U-100 (BD INSULIN SYRINGE ULTRAFINE) 30G X 1/2\" 0.3 ML MISC 1 Syringe daily. Use one syringe  daily or as directed. 100 each prn       EXAM:    Vitals: Ht 5' 11\" (1.803 m)  Wt 250 lb (113.4 kg)  BMI 34.87 kg/m2  BMI: Body mass index is 34.87 kg/(m^2).  Height: 5' 11\"    Constitutional/ general:  Pt is in no apparent distress, appears well-nourished.  Cooperative with history and physical exam.       Vascular:  Pedal pulses are palpable bilaterally for both the DP and PT arteries.  CFT < 3 sec.  No edema.  Pedal hair growth noted.       Neuro:  Alert and oriented x 3. Coordinated gait.  Light touch sensation is diminished to the L4, L5, S1 distributions.  No evidence of neurological-based weakness, spasticity, or contracture in the lower extremities.       Derm: Full thickness ulceration to the level of the subcutaneous fat, sub right 1st metatarsal head.  Base is granular.  Prolific hyperkeratotic eschar around the edges.  No purulence. The wound does not probe to bone.  1.0cm  X 0.8cm oval, x 0.2cm deep.     New ulceration, plantar lateral right 5th metatarsal head:  1.5cm L x 1.0cm W x 0.1cm deep, toe the level of deeper skin.      Neither wound shows any surrounding erythema, malodor, purulence.        Musculoskeletal:    Lower extremity muscle strength is normal.  Patient " is ambulatory without an assistive device or brace .  No gross deformities.  There is b/l limited ankle dorsiflexion with knee extended.   ROM is increased with flexion of the knee b/l.  Digital contractures.     X-ray Right foot:  I personally reviewed the 4 weight bearing views.  Partial amputation of the hallux.  Digital contractures. No evidence of osteomyelitis.     Valentino Molina DPM, FACFAS, MS    [place] Department of Podiatry/Foot & Ankle Surgery            Again, thank you for allowing me to participate in the care of your patient.        Sincerely,        Valentino Molina DPM

## 2017-11-27 NOTE — PROGRESS NOTES
"ASSESSMENT/PLAN:    Encounter Diagnoses   Name Primary?     Type 2 diabetes mellitus with peripheral neuropathy (H) Yes     Ulcer of midfoot, right, with fat layer exposed (H)        We reviewed wound care instructions, purpose of and need for offloading,  risk of infection and hospitalization.     He was again offered crutches. Despite reported use of the CAM walker, the wound persists and there is a new wound.  He presented to clinic in athletic shoes.    He explained that he is not able to use crutches at work. He is a  for new hires on the floor, working up to 10 hours shifts.  He is not opposed to crutches, but prefers to wait a few weeks.  He works for [employer] and it is a very busy time of the year.      I told him it is my job to continue to review wound care options and recommend appropriate offloading.  It is also my job to make sure he realizes a real risk of infection which could potentially lead to hospitalization and surgery.  He will hear this from me at every visit.  I told him that non weight bearing is likely needed, if he has a chance to heal the ulcers.  I also asked him how his diabetes control is and explained that poor control can be a barrier to healing. He reported that it has not been ideal and is seeing his primary care physician in the near future.      He is to monitor for redness, drainage, swelling, pain and seek immediate medical attention if his foot is worsening.     Baseline x-ray and labs ordered.     Follow up in 3-4 weeks.    Excisional debridment procedure discussed.  Goals of removing non-viable tissue, evaluating full extent of wound, and promoting wound healing were explained.  Pt provided verbal and written consent.  A \"Time Out\" was called.     Using a sterile #15 blade and tissue nippers, excisional debridment of the right foot ulcer was performed, full-thickness to the level of, and including, the subcutaneous tissue.  The hyperkeratotic eschar was removed, by " "excising skin edges back to healthy, bleeding tissue .  Other non-viable tissue was excised. The ulcer base was scraped to remove bioburden and promote healing.  There was moderate bleeding with the procedure. No anesthesia needed due to peripheral neuropathy.  Sterile dressing applied.    Weight management plan: Patient was referred to their PCP to discuss a diet and exercise plan.    Valentino Molina DPM, FACFAS, MS    [place] Department of Podiatry/Foot & Ankle Surgery      ____________________________________________________________________    HPI:       Chief Complaint: follow up for ulceration, right foot;  Type 2 diabetes with peripheral neuropathy  Onset of problem: three months  History well documented in previous visits.  No new concerns.  He uses a CAM walker   Reports a new ulceration that he associates from wearing a compression sock one day.  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  Because of this work and the time of year, he says that he is not able to use crutches.  \"Can we wait a few weeks.\"      *  Past Medical History:   Diagnosis Date     Cerebral infarction (H)      Chronic infection      H/O heartburn      History of heartburn      Hypertension      Numbness and tingling      Obesity      Type II or unspecified type diabetes mellitus without mention of complication, not stated as uncontrolled    *  *  Past Surgical History:   Procedure Laterality Date     AMPUTATE FOOT Left 8/18/2016    Procedure: AMPUTATE FOOT;  Surgeon: Jack Younger DPM;  Location:  OR     AMPUTATE TOE(S) Right 2/1/2016    Procedure: AMPUTATE TOE(S);  Surgeon: Rachelle Manriquez DPM, Pod;  Location:  OR     ANGIOGRAM       COLONOSCOPY       COMBINED CYSTOSCOPY, RETROGRADES, URETEROSCOPY, INSERT STENT Left 8/21/2016    Procedure: COMBINED CYSTOSCOPY, RETROGRADES, URETEROSCOPY, INSERT STENT;  Surgeon: Artemio Valenzuela MD;  Location:  " OR     COMBINED CYSTOSCOPY, RETROGRADES, URETEROSCOPY, LASER HOLMIUM LITHOTRIPSY URETER(S), INSERT STENT Left 10/3/2016    Procedure: COMBINED CYSTOSCOPY, RETROGRADES, URETEROSCOPY, LASER HOLMIUM LITHOTRIPSY URETER(S), INSERT STENT;  Surgeon: Artemio Valenzuela MD;  Location: [facility] OR     EXTRACORPOREAL SHOCK WAVE LITHOTRIPSY (ESWL) Left 9/8/2016    Procedure: EXTRACORPOREAL SHOCK WAVE LITHOTRIPSY (ESWL);  Surgeon: Artemio Valenzuela MD;  Location: [facility] OR     RECESSION GASTROCNEMIUS Right 2/1/2016    Procedure: RECESSION GASTROCNEMIUS;  Surgeon: Rachelle Manriquez, DPM, Pod;  Location: [facility] OR   *  *  Current Outpatient Prescriptions   Medication Sig Dispense Refill     amLODIPine (NORVASC) 5 MG tablet TAKE 1 TABLET(5 MG) BY MOUTH DAILY 90 tablet 0     atorvastatin (LIPITOR) 40 MG tablet TAKE 1 TABLET BY MOUTH EVERY DAY 30 tablet 0     NOVOLOG FLEXPEN 100 UNIT/ML soln INJECT 10 TO 35 UNITS UNDER THE SKIN THREE TIMES DAILY 30 mL 0     order for DME CAM boot, Large 1 Device 0     metFORMIN (GLUCOPHAGE-XR) 500 MG 24 hr tablet TAKE 2 TABLETS BY MOUTH TWICE DAILY WITH MEALS 360 tablet 0     insulin glargine (LANTUS SOLOSTAR) 100 UNIT/ML injection Take up to 50 units BID 60 mL 1     sitagliptin (JANUVIA) 100 MG tablet Take 1 tablet (100 mg) by mouth daily 90 tablet 3     lisinopril (PRINIVIL/ZESTRIL) 20 MG tablet Take 1 tablet (20 mg) by mouth daily 90 tablet 3     gabapentin (NEURONTIN) 100 MG capsule Take 300 mg by mouth At Bedtime       CIALIS 5 MG tablet TAKE 1 TABLET BY MOUTH EVERY DAY AS NEEDED 30 tablet 0     blood glucose monitoring (NO BRAND SPECIFIED) meter device kit Use to test blood sugar 3 times daily or as directed. 1 kit 0     blood glucose monitoring (NO BRAND SPECIFIED) test strip Use to test blood sugars 3 times daily or as directed 300 strip 3     ketoconazole (NIZORAL) 2 % shampoo Apply to the affected area and wash off after 5 minutes. 120 mL 11     Co-Enzyme Q10 100 MG CAPS Take 100 mg by mouth  "daily        Cyanocobalamin (VITAMIN B 12 PO) Take 500 mcg by mouth daily        Cholecalciferol (VITAMIN D3 PO) Take 1,000 Units by mouth daily        Multiple Vitamins-Minerals (MULTIVITAMIN PO) Take 1 tablet by mouth daily        Omega-3 Fatty Acids (OMEGA-3 FISH OIL PO) Take 2 g by mouth daily        lancets thin MISC 1 each 3 times daily. 300 each 3     Insulin Syringe-Needle U-100 (BD INSULIN SYRINGE ULTRAFINE) 30G X 1/2\" 0.3 ML MISC 1 Syringe daily. Use one syringe  daily or as directed. 100 each prn       EXAM:    Vitals: Ht 5' 11\" (1.803 m)  Wt 250 lb (113.4 kg)  BMI 34.87 kg/m2  BMI: Body mass index is 34.87 kg/(m^2).  Height: 5' 11\"    Constitutional/ general:  Pt is in no apparent distress, appears well-nourished.  Cooperative with history and physical exam.       Vascular:  Pedal pulses are palpable bilaterally for both the DP and PT arteries.  CFT < 3 sec.  No edema.  Pedal hair growth noted.       Neuro:  Alert and oriented x 3. Coordinated gait.  Light touch sensation is diminished to the L4, L5, S1 distributions.  No evidence of neurological-based weakness, spasticity, or contracture in the lower extremities.       Derm: Full thickness ulceration to the level of the subcutaneous fat, sub right 1st metatarsal head.  Base is granular.  Prolific hyperkeratotic eschar around the edges.  No purulence. The wound does not probe to bone.  1.0cm  X 0.8cm oval, x 0.2cm deep.     New ulceration, plantar lateral right 5th metatarsal head:  1.5cm L x 1.0cm W x 0.1cm deep, toe the level of deeper skin.      Neither wound shows any surrounding erythema, malodor, purulence.        Musculoskeletal:    Lower extremity muscle strength is normal.  Patient is ambulatory without an assistive device or brace .  No gross deformities.  There is b/l limited ankle dorsiflexion with knee extended.   ROM is increased with flexion of the knee b/l.  Digital contractures.     X-ray Right foot:  I personally reviewed the 4 weight " bearing views.  Partial amputation of the hallux.  Digital contractures. No evidence of osteomyelitis.     Valentino Molina DPM, CLYDE, MS    [place] Department of Podiatry/Foot & Ankle Surgery

## 2017-11-27 NOTE — MR AVS SNAPSHOT
After Visit Summary   11/27/2017    Crispin Rojas    MRN: [id number]           Patient Information     Date Of Birth          1960        Visit Information        Provider Department      11/27/2017 7:00 AM Valentino Molina DPM [facility]        Today's Diagnoses     Type 2 diabetes mellitus with peripheral neuropathy (H)    -  1    Ulcer of midfoot, right, with fat layer exposed (H)           Follow-ups after your visit        Your next 10 appointments already scheduled     Dec 18, 2017  7:00 AM CST   Return Visit with Valentino Molina DPM   [facility] ([facility])    [address] [address] [phone]              Who to contact     If you have questions or need follow up information about today's clinic visit or your schedule please contact [facility] directly at [phone].  Normal or non-critical lab and imaging results will be communicated to you by EGENhart, letter or phone within 4 business days after the clinic has received the results. If you do not hear from us within 7 days, please contact the clinic through ScoreBigt or phone. If you have a critical or abnormal lab result, we will notify you by phone as soon as possible.  Submit refill requests through LanternCRM or call your pharmacy and they will forward the refill request to us. Please allow 3 business days for your refill to be completed.          Additional Information About Your Visit        MyChart Information     LanternCRM gives you secure access to your electronic health record. If you see a primary care provider, you can also send messages to your care team and make appointments. If you have questions, please call your primary care clinic.  If you do not have a primary care provider, please call [phone] and they will assist you.        Care EveryWhere ID     This is your Care EveryWhere ID.  "This could be used by other organizations to access your [place] medical records  [id number]        Your Vitals Were     Height BMI (Body Mass Index)                5' 11\" (1.803 m) 34.87 kg/m2           Blood Pressure from Last 3 Encounters:   11/27/17 140/70   11/22/17 167/86   10/30/17 158/68    Weight from Last 3 Encounters:   11/27/17 250 lb (113.4 kg)   10/30/17 250 lb (113.4 kg)   10/09/17 250 lb (113.4 kg)              We Performed the Following     CRP inflammation     DEBRIDE SKIN/SUBQ TISSUE     ESR: Erythrocyte sedimentation rate     WBC count     XR Foot Right G/E 3 Views        Primary Care Provider Office Phone # Fax #    Aden Lopez [phone] [address] [facility] [address]        Equal Access to Services     YE MARTÍNEZ : Hadii geraldine arechiga hadasho Socas, waaxda luqadaha, qaybta kaalmada adeegyada, blanca tellez hayarya moran . So [facility] [phone].    ATENCIÓN: Si habla español, tiene a gomez disposición servicios gratuitos de asistencia lingüística. Llame al [phone].    We comply with applicable federal civil rights laws and [place] laws. We do not discriminate on the basis of race, color, national origin, age, disability, sex, sexual orientation, or gender identity.            Thank you!     Thank you for choosing [facility]  for your care. Our goal is always to provide you with excellent care. Hearing back from our patients is one way we can continue to improve our services. Please take a few minutes to complete the written survey that you may receive in the mail after your visit with us. Thank you!             Your Updated Medication List - Protect others around you: Learn how to safely use, store and throw away your medicines at www.disposemymeds.org.          This list is accurate as of: 11/27/17  8:02 AM.  Always use your most recent med list.                   Brand Name Dispense Instructions for use " "Diagnosis    amLODIPine 5 MG tablet    NORVASC    90 tablet    TAKE 1 TABLET(5 MG) BY MOUTH DAILY    Other specified transient cerebral ischemias       atorvastatin 40 MG tablet    LIPITOR    30 tablet    TAKE 1 TABLET BY MOUTH EVERY DAY    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), Hyperlipidemia LDL goal <100       BD insulin syringe ultrafine 30G X 1/2\" 0.3 ML   Generic drug:  insulin syringe-needle U-100     100 each    1 Syringe daily. Use one syringe  daily or as directed.    Type 2 diabetes, HbA1C goal < 8% (H)       blood glucose monitoring meter device kit    no brand specified    1 kit    Use to test blood sugar 3 times daily or as directed.    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)       CIALIS 5 MG tablet   Generic drug:  tadalafil     30 tablet    TAKE 1 TABLET BY MOUTH EVERY DAY AS NEEDED    Erectile dysfunction due to diseases classified elsewhere       Co-Enzyme Q10 100 MG Caps      Take 100 mg by mouth daily        gabapentin 100 MG capsule    NEURONTIN     Take 300 mg by mouth At Bedtime        insulin glargine 100 UNIT/ML injection    LANTUS SOLOSTAR    60 mL    Take up to 50 units BID    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)       ketoconazole 2 % shampoo    NIZORAL    120 mL    Apply to the affected area and wash off after 5 minutes.    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)       MULTIVITAMIN PO      Take 1 tablet by mouth daily        NovoLOG " FLEXPEN 100 UNIT/ML injection   Generic drug:  insulin aspart     30 mL    INJECT 10 TO 35 UNITS UNDER THE SKIN THREE TIMES DAILY    Type 2 diabetes mellitus with diabetic peripheral angiopathy and gangrene, with long-term current use of insulin (H)       OMEGA-3 FISH OIL PO      Take 2 g by mouth daily        order for DME     1 Device    CAM boot, Large    Type 2 diabetes mellitus with peripheral neuropathy (H), Skin ulcer of right foot with fat layer exposed (H)       sitagliptin 100 MG tablet    JANUVIA    90 tablet    Take 1 tablet (100 mg) by mouth daily    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    Type 2 diabetes, HbA1C goal < 8% (H)       VITAMIN B 12 PO      Take 500 mcg by mouth daily        VITAMIN D3 PO      Take 1,000 Units by mouth daily

## 2017-11-27 NOTE — NURSING NOTE
"Chief Complaint   Patient presents with     RECHECK     ulcer of right foot     Foot Problems     blister on outside of foot as well after wearing compression socks 1 week ago        Initial /70 (BP Location: Right arm, Cuff Size: Adult Large)  Ht 5' 11\" (1.803 m)  Wt 250 lb (113.4 kg)  BMI 34.87 kg/m2 Estimated body mass index is 34.87 kg/(m^2) as calculated from the following:    Height as of this encounter: 5' 11\" (1.803 m).    Weight as of this encounter: 250 lb (113.4 kg).  Medication Reconciliation: complete   Obdulia Ireland MA  \    "

## 2017-12-10 DIAGNOSIS — E11.49 DIABETES MELLITUS TYPE 2 WITH NEUROLOGICAL MANIFESTATIONS (H): ICD-10-CM

## 2017-12-10 NOTE — LETTER
[address] [address]  [phone]          December 12, 2017    Crispin Rojas                                                                                                                     [address] [facility] [address]            Dear Crispin,    We recently received a call from your pharmacy requesting a refill of your medication (METFORMIN).    A review of your chart indicates that an appointment is required.  Please contact our office at [phone] to schedule your doctor's appointment.    We have authorized one refill of your medication to allow time for you to schedule your appointment.    Taking care of your health is important to us and ongoing visits with your provider are vital to your care.  We look forward to seeing you in the near future.    Sincerely,    DAYANARA DON MD/June WARE

## 2017-12-12 RX ORDER — METFORMIN HCL 500 MG
TABLET, EXTENDED RELEASE 24 HR ORAL
Qty: 120 TABLET | Refills: 0 | Status: SHIPPED | OUTPATIENT
Start: 2017-12-12 | End: 2018-01-11

## 2017-12-12 NOTE — TELEPHONE ENCOUNTER
Pt due for DM visit, one month sent, appointment letter sent  Prescription approved per [facility] Refill Protocol.  June Thomas RN, BSN

## 2017-12-17 DIAGNOSIS — E11.52 TYPE 2 DIABETES MELLITUS WITH DIABETIC PERIPHERAL ANGIOPATHY AND GANGRENE, WITH LONG-TERM CURRENT USE OF INSULIN (H): ICD-10-CM

## 2017-12-17 DIAGNOSIS — Z79.4 TYPE 2 DIABETES MELLITUS WITH DIABETIC PERIPHERAL ANGIOPATHY AND GANGRENE, WITH LONG-TERM CURRENT USE OF INSULIN (H): ICD-10-CM

## 2017-12-17 NOTE — LETTER
[address] [address]  Phone: [phone]    12/20/17    Crispin Rojas  [address] [facility] [address]      Dear Crispin:     We recently received a call from your pharmacy requesting a refill of your medication - NOVOLOG FLEXPEN 100 UNIT/ML soln.    A review of your chart indicates that an appointment is required with your provider for office visit and fasting labs. Please call the clinic at [phone] to schedule your appointment.    We have authorized one refill of your medication to allow time for you to schedule your appointment.    Taking care of your health is important to us, and ongoing visits with your provider are vital to your care.  We look forward to seeing you in the near future.          Sincerely,      Aden Lopez MD/Cara HOPKINS RN

## 2017-12-18 ENCOUNTER — OFFICE VISIT (OUTPATIENT)
Dept: PODIATRY | Facility: CLINIC | Age: 57
End: 2017-12-18
Payer: COMMERCIAL

## 2017-12-18 VITALS
SYSTOLIC BLOOD PRESSURE: 156 MMHG | BODY MASS INDEX: 34.86 KG/M2 | DIASTOLIC BLOOD PRESSURE: 84 MMHG | HEIGHT: 71 IN | WEIGHT: 249 LBS

## 2017-12-18 DIAGNOSIS — L97.412 ULCER OF RIGHT HEEL AND MIDFOOT WITH FAT LAYER EXPOSED (H): ICD-10-CM

## 2017-12-18 DIAGNOSIS — E11.42 TYPE 2 DIABETES MELLITUS WITH PERIPHERAL NEUROPATHY (H): Primary | ICD-10-CM

## 2017-12-18 PROCEDURE — 11042 DBRDMT SUBQ TIS 1ST 20SQCM/<: CPT | Performed by: PODIATRIST

## 2017-12-18 PROCEDURE — 99214 OFFICE O/P EST MOD 30 MIN: CPT | Mod: 25 | Performed by: PODIATRIST

## 2017-12-18 NOTE — MR AVS SNAPSHOT
After Visit Summary   12/18/2017    Crispin Rojas    MRN: [id number]           Patient Information     Date Of Birth          1960        Visit Information        Provider Department      12/18/2017 7:00 AM Valentino Molina DPM [facility]        Today's Diagnoses     Type 2 diabetes mellitus with peripheral neuropathy (H)    -  1    Ulcer of right heel and midfoot with fat layer exposed (H)           Follow-ups after your visit        Your next 10 appointments already scheduled     Dec 27, 2017  9:30 AM CST   PHYSICAL with Aden Lopez MD   [facility] ([facility])    [address] [place] [address]   [phone]            Anthony 15, 2018  7:00 AM CST   Return Visit with Valentino Molina DPM   [facility] ([facility])    [address] [id number] [phone]              Who to contact     If you have questions or need follow up information about today's clinic visit or your schedule please contact [facility] directly at [phone].  Normal or non-critical lab and imaging results will be communicated to you by Coferonhart, letter or phone within 4 business days after the clinic has received the results. If you do not hear from us within 7 days, please contact the clinic through Coferonhart or phone. If you have a critical or abnormal lab result, we will notify you by phone as soon as possible.  Submit refill requests through leemail or call your pharmacy and they will forward the refill request to us. Please allow 3 business days for your refill to be completed.          Additional Information About Your Visit        MyChart Information     leemail gives you secure access to your electronic health record. If you see a primary care provider, you can also send messages to your care team and make appointments.  "If you have questions, please call your primary care clinic.  If you do not have a primary care provider, please call [phone] and they will assist you.        Care EveryWhere ID     This is your Care EveryWhere ID. This could be used by other organizations to access your [facility] medical records  [id number]        Your Vitals Were     Height BMI (Body Mass Index)                5' 11\" (1.803 m) 34.73 kg/m2           Blood Pressure from Last 3 Encounters:   12/18/17 156/84   11/27/17 140/70   11/22/17 167/86    Weight from Last 3 Encounters:   12/18/17 249 lb (112.9 kg)   11/27/17 250 lb (113.4 kg)   10/30/17 250 lb (113.4 kg)              We Performed the Following     DEBRIDE SKIN/SUBQ TISSUE        Primary Care Provider Office Phone # Fax #    Aden Lopez [phone] [address] [facility] [address]        Equal Access to Services     [facility] : Hadii aad ku hadasho Soomaali, waaxda luqadaha, qaybta kaalmada adeegyada, waxay idiin haymistyn sabina kharajasson moran . So [facility] [phone].    ATENCIÓN: Si habla español, tiene a gomez disposición servicios gratuitos de asistencia lingüística. [facility] [phone].    We comply with applicable federal civil rights laws and [place] laws. We do not discriminate on the basis of race, color, national origin, age, disability, sex, sexual orientation, or gender identity.            Thank you!     Thank you for choosing [facility]  for your care. Our goal is always to provide you with excellent care. Hearing back from our patients is one way we can continue to improve our services. Please take a few minutes to complete the written survey that you may receive in the mail after your visit with us. Thank you!             Your Updated Medication List - Protect others around you: Learn how to safely use, store and throw away your medicines at www.disposemymeds.org.          This list is accurate as of: " "12/18/17  7:29 AM.  Always use your most recent med list.                   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 Use one syringe  daily or as directed.    Type 2 diabetes, HbA1C goal < 8% (H)       blood glucose monitoring meter device kit    no brand specified    1 kit    Use to test blood sugar 3 times daily or as directed.    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)       CIALIS 5 MG tablet   Generic drug:  tadalafil     30 tablet    TAKE 1 TABLET BY MOUTH EVERY DAY AS NEEDED    Erectile dysfunction due to diseases classified elsewhere       Co-Enzyme Q10 100 MG Caps      Take 100 mg by mouth daily        gabapentin 100 MG capsule    NEURONTIN     Take 300 mg by mouth At Bedtime        insulin glargine 100 UNIT/ML injection    LANTUS SOLOSTAR    60 mL    Take up to 50 units BID    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)       ketoconazole 2 % shampoo    NIZORAL    120 mL    Apply to the affected area and wash off after 5 minutes.    Seborrheic dermatitis       lisinopril 20 MG tablet    PRINIVIL/ZESTRIL    90 tablet    Take 1 tablet (20 mg) by mouth daily    Essential hypertension       metFORMIN 500 MG 24 hr tablet    GLUCOPHAGE-XR    120 tablet    TAKE 2 TABLETS BY MOUTH TWICE DAILY WITH MEALS    Diabetes mellitus " type 2 with neurological manifestations (H)       MULTIVITAMIN PO      Take 1 tablet by mouth daily        NovoLOG FLEXPEN 100 UNIT/ML injection   Generic drug:  insulin aspart     30 mL    INJECT 10 TO 35 UNITS UNDER THE SKIN THREE TIMES DAILY    Type 2 diabetes mellitus with diabetic peripheral angiopathy and gangrene, with long-term current use of insulin (H)       OMEGA-3 FISH OIL PO      Take 2 g by mouth daily        order for DME     1 Device    CAM boot, Large    Type 2 diabetes mellitus with peripheral neuropathy (H), Skin ulcer of right foot with fat layer exposed (H)       sitagliptin 100 MG tablet    JANUVIA    90 tablet    Take 1 tablet (100 mg) by mouth daily    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    Type 2 diabetes, HbA1C goal < 8% (H)       VITAMIN B 12 PO      Take 500 mcg by mouth daily        VITAMIN D3 PO      Take 1,000 Units by mouth daily

## 2017-12-18 NOTE — NURSING NOTE
"Chief Complaint   Patient presents with     Wound Check     right foot       Initial /84  Ht 5' 11\" (1.803 m)  Wt 249 lb (112.9 kg)  BMI 34.73 kg/m2 Estimated body mass index is 34.73 kg/(m^2) as calculated from the following:    Height as of this encounter: 5' 11\" (1.803 m).    Weight as of this encounter: 249 lb (112.9 kg).  Medication Reconciliation: complete   Obdulia Ireland MA      "

## 2017-12-18 NOTE — LETTER
"    12/18/2017         RE: Crispin Rojas  [address] [facility] [address]        Dear Colleague,    Thank you for referring your patient, Crispin Rojas, to the [facility]. Please see a copy of my visit note below.    ASSESSMENT/PLAN:  Encounter Diagnoses   Name Primary?     Type 2 diabetes mellitus with peripheral neuropathy (H) Yes     Ulcer of right heel and midfoot with fat layer exposed (H)        As always, wereviewed wound care instructions, purpose of and need for offloading,  risk of infection and hospitalization.     He has not appropriately offloaded as recommended on multiple occasions:  Crutches, CAM walker.  He attributes this to having to work on the floor at [employer].     He is going to [place] on January 1st and intends on resting, using crutches and trying to heal the ulcers.    Reviewed at every visit:  I told him it is my job to continue to review wound care options and recommend appropriate offloading.  It is also my job to make sure he realizes a real risk of infection which could potentially lead to hospitalization and surgery.  He will hear this from me at every visit.  I told him that non weight bearing is likely needed, if he has a chance to heal the ulcers.  I also asked him how his diabetes control is and explained that poor control can be a barrier to healing. He reported that it has not been ideal and is seeing his primary care physician in the near future.      He is to monitor for redness, drainage, swelling, pain and seek immediate medical attention if his foot is worsening.     In my opinion, he is at risk for infection and hospitalization.      Follow up in 3-4 weeks.    Excisional debridment procedure discussed.  Goals of removing non-viable tissue, evaluating full extent of wound, and promoting wound healing were explained.  Pt provided verbal and written consent.  A \"Time Out\" was called.     Using a sterile #15 blade and tissue nippers, " "excisional debridment of the right foot ulcer was performed, full-thickness to the level of, and including, the subcutaneous tissue.  The hyperkeratotic eschar was removed, by excising skin edges back to healthy, bleeding tissue .  Other non-viable tissue was excised. The ulcer base was scraped to remove bioburden and promote healing.  There was moderate bleeding with the procedure. No anesthesia needed due to peripheral neuropathy.  Sterile dressing applied.    Weight management plan: Patient was referred to their PCP to discuss a diet and exercise plan.    Greater than 50% (15 min) was spent in face-to-face counseling and coordination of care: reviewing wound care, risks, signs of infection, possible future elective surgery, amputation if infection ensues. This excluded any procedure time during the visit.  .    Valentino Molina DPM, FACFAS, MS    [place] Department of Podiatry/Foot & Ankle Surgery      ____________________________________________________________________    HPI:       Chief Complaint: follow up for ulceration, right foot;  Type 2 diabetes with peripheral neuropathy  Onset of problem: three months  History well documented in previous visits.  No new concerns.  He uses a CAM walker   Reports a new ulceration that he associates from wearing a compression sock one day.  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  Because of this work and the time of year, he says that he is not able to use crutches.  \"Can we wait a few weeks.\"      *  Past Medical History:   Diagnosis Date     Cerebral infarction (H)      Chronic infection      H/O heartburn      History of heartburn      Hypertension      Numbness and tingling      Obesity      Type II or unspecified type diabetes mellitus without mention of complication, not stated as uncontrolled    *  *  Past Surgical History:   Procedure Laterality Date     AMPUTATE FOOT Left 8/18/2016    " Procedure: AMPUTATE FOOT;  Surgeon: Jack Younger DPM;  Location: [facility] OR     AMPUTATE TOE(S) Right 2/1/2016    Procedure: AMPUTATE TOE(S);  Surgeon: Rachelle Manriquez DPM, Pod;  Location: [facility] OR     ANGIOGRAM       COLONOSCOPY       COMBINED CYSTOSCOPY, RETROGRADES, URETEROSCOPY, INSERT STENT Left 8/21/2016    Procedure: COMBINED CYSTOSCOPY, RETROGRADES, URETEROSCOPY, INSERT STENT;  Surgeon: Artemio Valenzuela MD;  Location: [facility] OR     COMBINED CYSTOSCOPY, RETROGRADES, URETEROSCOPY, LASER HOLMIUM LITHOTRIPSY URETER(S), INSERT STENT Left 10/3/2016    Procedure: COMBINED CYSTOSCOPY, RETROGRADES, URETEROSCOPY, LASER HOLMIUM LITHOTRIPSY URETER(S), INSERT STENT;  Surgeon: Artemio Valenzuela MD;  Location: [facility] OR     EXTRACORPOREAL SHOCK WAVE LITHOTRIPSY (ESWL) Left 9/8/2016    Procedure: EXTRACORPOREAL SHOCK WAVE LITHOTRIPSY (ESWL);  Surgeon: Artemio Valenzuela MD;  Location: [facility] OR     RECESSION GASTROCNEMIUS Right 2/1/2016    Procedure: RECESSION GASTROCNEMIUS;  Surgeon: Rachelle Manriquez DPM, Pod;  Location: [facility] OR   *  *  Current Outpatient Prescriptions   Medication Sig Dispense Refill     metFORMIN (GLUCOPHAGE-XR) 500 MG 24 hr tablet TAKE 2 TABLETS BY MOUTH TWICE DAILY WITH MEALS 120 tablet 0     atorvastatin (LIPITOR) 40 MG tablet TAKE 1 TABLET BY MOUTH EVERY DAY 90 tablet 1     amLODIPine (NORVASC) 5 MG tablet TAKE 1 TABLET(5 MG) BY MOUTH DAILY 90 tablet 0     NOVOLOG FLEXPEN 100 UNIT/ML soln INJECT 10 TO 35 UNITS UNDER THE SKIN THREE TIMES DAILY 30 mL 0     order for DME CAM boot, Large 1 Device 0     insulin glargine (LANTUS SOLOSTAR) 100 UNIT/ML injection Take up to 50 units BID 60 mL 1     sitagliptin (JANUVIA) 100 MG tablet Take 1 tablet (100 mg) by mouth daily 90 tablet 3     lisinopril (PRINIVIL/ZESTRIL) 20 MG tablet Take 1 tablet (20 mg) by mouth daily 90 tablet 3     gabapentin (NEURONTIN) 100 MG capsule Take 300 mg by mouth At Bedtime       CIALIS 5 MG tablet TAKE 1 TABLET BY MOUTH EVERY DAY  "AS NEEDED 30 tablet 0     blood glucose monitoring (NO BRAND SPECIFIED) meter device kit Use to test blood sugar 3 times daily or as directed. 1 kit 0     blood glucose monitoring (NO BRAND SPECIFIED) test strip Use to test blood sugars 3 times daily or as directed 300 strip 3     ketoconazole (NIZORAL) 2 % shampoo Apply to the affected area and wash off after 5 minutes. 120 mL 11     Co-Enzyme Q10 100 MG CAPS Take 100 mg by mouth daily        Cyanocobalamin (VITAMIN B 12 PO) Take 500 mcg by mouth daily        Cholecalciferol (VITAMIN D3 PO) Take 1,000 Units by mouth daily        Multiple Vitamins-Minerals (MULTIVITAMIN PO) Take 1 tablet by mouth daily        Omega-3 Fatty Acids (OMEGA-3 FISH OIL PO) Take 2 g by mouth daily        lancets thin MISC 1 each 3 times daily. 300 each 3     Insulin Syringe-Needle U-100 (BD INSULIN SYRINGE ULTRAFINE) 30G X 1/2\" 0.3 ML MISC 1 Syringe daily. Use one syringe  daily or as directed. 100 each prn       EXAM:    Vitals: /84  Ht 5' 11\" (1.803 m)  Wt 249 lb (112.9 kg)  BMI 34.73 kg/m2  BMI: Body mass index is 34.73 kg/(m^2).  Height: 5' 11\"    Constitutional/ general:  Pt is in no apparent distress, appears well-nourished.  Cooperative with history and physical exam.       Vascular:  Pedal pulses are palpable bilaterally for both the DP and PT arteries.  CFT < 3 sec.  No edema.  Pedal hair growth noted.       Neuro:  Alert and oriented x 3. Coordinated gait.  Light touch sensation is diminished to the L4, L5, S1 distributions.  No evidence of neurological-based weakness, spasticity, or contracture in the lower extremities.       Derm: Full thickness ulceration to the level of the subcutaneous fat, sub right 1st metatarsal head.  Base is granular.  Prolific hyperkeratotic eschar around the edges.  No purulence. The wound does not probe to bone.  1.0cm  X 0.8cm oval, x 0.2cm deep.     Ulceration, plantar lateral right 5th metatarsal head:  1.5cm L x 1.0cm W x 0.1cm deep, toe " the level of deeper skin.   Inverted triangular shaped.     Neither wound shows any surrounding erythema, malodor, purulence.        Musculoskeletal:    Lower extremity muscle strength is normal.  Patient is ambulatory without an assistive device or brace .  No gross deformities.  There is b/l limited ankle dorsiflexion with knee extended.   ROM is increased with flexion of the knee b/l.  Digital contractures.     Previous X-ray Right foot:  I personally reviewed the 4 weight bearing views.  Partial amputation of the hallux.  Digital contractures. No evidence of osteomyelitis.     IMPRESSION: Previous amputation of the distal first phalanx. Relative  soft tissue swelling throughout the first digit. Limited osseous  detail underlying the first metatarsal head as well as fifth  metatarsal head on lateral radiograph. No obvious destruction or  lucencies at these locations to suggest osteomyelitis. The distal  aspect of first proximal phalanx is truncated, especially at the  medial margin, though this may be postoperative, clinical correlation  necessary regarding any possibility of osteomyelitis at this specific  location. No acute fracture.     MD Valentino BARNETT DPM, CLYDE, MS [place] Department of Podiatry/Foot & Ankle Surgery          Again, thank you for allowing me to participate in the care of your patient.        Sincerely,        Valentino Molina DPM

## 2017-12-18 NOTE — PROGRESS NOTES
"ASSESSMENT/PLAN:  Encounter Diagnoses   Name Primary?     Type 2 diabetes mellitus with peripheral neuropathy (H) Yes     Ulcer of right heel and midfoot with fat layer exposed (H)        As always, wereviewed wound care instructions, purpose of and need for offloading,  risk of infection and hospitalization.     He has not appropriately offloaded as recommended on multiple occasions:  Crutches, CAM walker.  He attributes this to having to work on the floor at [employer].     He is going to [place] on January 1st and intends on resting, using crutches and trying to heal the ulcers.    Reviewed at every visit:  I told him it is my job to continue to review wound care options and recommend appropriate offloading.  It is also my job to make sure he realizes a real risk of infection which could potentially lead to hospitalization and surgery.  He will hear this from me at every visit.  I told him that non weight bearing is likely needed, if he has a chance to heal the ulcers.  I also asked him how his diabetes control is and explained that poor control can be a barrier to healing. He reported that it has not been ideal and is seeing his primary care physician in the near future.      He is to monitor for redness, drainage, swelling, pain and seek immediate medical attention if his foot is worsening.     In my opinion, he is at risk for infection and hospitalization.      Follow up in 3-4 weeks.    Excisional debridment procedure discussed.  Goals of removing non-viable tissue, evaluating full extent of wound, and promoting wound healing were explained.  Pt provided verbal and written consent.  A \"Time Out\" was called.     Using a sterile #15 blade and tissue nippers, excisional debridment of the right foot ulcer was performed, full-thickness to the level of, and including, the subcutaneous tissue.  The hyperkeratotic eschar was removed, by excising skin edges back to healthy, bleeding tissue .  Other non-viable tissue was " "excised. The ulcer base was scraped to remove bioburden and promote healing.  There was moderate bleeding with the procedure. No anesthesia needed due to peripheral neuropathy.  Sterile dressing applied.    Weight management plan: Patient was referred to their PCP to discuss a diet and exercise plan.    Greater than 50% (15 min) was spent in face-to-face counseling and coordination of care: reviewing wound care, risks, signs of infection, possible future elective surgery, amputation if infection ensues. This excluded any procedure time during the visit.  .    Valentino Molina, SHRUTHI, FACFAS, MS    [facility]      ____________________________________________________________________    HPI:       Chief Complaint: follow up for ulceration, right foot;  Type 2 diabetes with peripheral neuropathy  Onset of problem: three months  History well documented in previous visits.  No new concerns.  He uses a CAM walker   Reports a new ulceration that he associates from wearing a compression sock one day.  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  Because of this work and the time of year, he says that he is not able to use crutches.  \"Can we wait a few weeks.\"      *  Past Medical History:   Diagnosis Date     Cerebral infarction (H)      Chronic infection      H/O heartburn      History of heartburn      Hypertension      Numbness and tingling      Obesity      Type II or unspecified type diabetes mellitus without mention of complication, not stated as uncontrolled    *  *  Past Surgical History:   Procedure Laterality Date     AMPUTATE FOOT Left 8/18/2016    Procedure: AMPUTATE FOOT;  Surgeon: Jack Younger DPM;  Location: [facility] OR     AMPUTATE TOE(S) Right 2/1/2016    Procedure: AMPUTATE TOE(S);  Surgeon: Rachelle Manriquez DPM, Pod;  Location: [facility] OR     ANGIOGRAM       COLONOSCOPY       COMBINED CYSTOSCOPY, " RETROGRADES, URETEROSCOPY, INSERT STENT Left 8/21/2016    Procedure: COMBINED CYSTOSCOPY, RETROGRADES, URETEROSCOPY, INSERT STENT;  Surgeon: Artemio Valenzuela MD;  Location: [facility] OR     COMBINED CYSTOSCOPY, RETROGRADES, URETEROSCOPY, LASER HOLMIUM LITHOTRIPSY URETER(S), INSERT STENT Left 10/3/2016    Procedure: COMBINED CYSTOSCOPY, RETROGRADES, URETEROSCOPY, LASER HOLMIUM LITHOTRIPSY URETER(S), INSERT STENT;  Surgeon: Artemio Valenzuela MD;  Location: [facility] OR     EXTRACORPOREAL SHOCK WAVE LITHOTRIPSY (ESWL) Left 9/8/2016    Procedure: EXTRACORPOREAL SHOCK WAVE LITHOTRIPSY (ESWL);  Surgeon: Artemio Valenzuela MD;  Location: [facility] OR     RECESSION GASTROCNEMIUS Right 2/1/2016    Procedure: RECESSION GASTROCNEMIUS;  Surgeon: Rachelle Manriquez, DPM, Pod;  Location: [facility] OR   *  *  Current Outpatient Prescriptions   Medication Sig Dispense Refill     metFORMIN (GLUCOPHAGE-XR) 500 MG 24 hr tablet TAKE 2 TABLETS BY MOUTH TWICE DAILY WITH MEALS 120 tablet 0     atorvastatin (LIPITOR) 40 MG tablet TAKE 1 TABLET BY MOUTH EVERY DAY 90 tablet 1     amLODIPine (NORVASC) 5 MG tablet TAKE 1 TABLET(5 MG) BY MOUTH DAILY 90 tablet 0     NOVOLOG FLEXPEN 100 UNIT/ML soln INJECT 10 TO 35 UNITS UNDER THE SKIN THREE TIMES DAILY 30 mL 0     order for DME CAM boot, Large 1 Device 0     insulin glargine (LANTUS SOLOSTAR) 100 UNIT/ML injection Take up to 50 units BID 60 mL 1     sitagliptin (JANUVIA) 100 MG tablet Take 1 tablet (100 mg) by mouth daily 90 tablet 3     lisinopril (PRINIVIL/ZESTRIL) 20 MG tablet Take 1 tablet (20 mg) by mouth daily 90 tablet 3     gabapentin (NEURONTIN) 100 MG capsule Take 300 mg by mouth At Bedtime       CIALIS 5 MG tablet TAKE 1 TABLET BY MOUTH EVERY DAY AS NEEDED 30 tablet 0     blood glucose monitoring (NO BRAND SPECIFIED) meter device kit Use to test blood sugar 3 times daily or as directed. 1 kit 0     blood glucose monitoring (NO BRAND SPECIFIED) test strip Use to test blood sugars 3 times daily or as  "directed 300 strip 3     ketoconazole (NIZORAL) 2 % shampoo Apply to the affected area and wash off after 5 minutes. 120 mL 11     Co-Enzyme Q10 100 MG CAPS Take 100 mg by mouth daily        Cyanocobalamin (VITAMIN B 12 PO) Take 500 mcg by mouth daily        Cholecalciferol (VITAMIN D3 PO) Take 1,000 Units by mouth daily        Multiple Vitamins-Minerals (MULTIVITAMIN PO) Take 1 tablet by mouth daily        Omega-3 Fatty Acids (OMEGA-3 FISH OIL PO) Take 2 g by mouth daily        lancets thin MISC 1 each 3 times daily. 300 each 3     Insulin Syringe-Needle U-100 (BD INSULIN SYRINGE ULTRAFINE) 30G X 1/2\" 0.3 ML MISC 1 Syringe daily. Use one syringe  daily or as directed. 100 each prn       EXAM:    Vitals: /84  Ht 5' 11\" (1.803 m)  Wt 249 lb (112.9 kg)  BMI 34.73 kg/m2  BMI: Body mass index is 34.73 kg/(m^2).  Height: 5' 11\"    Constitutional/ general:  Pt is in no apparent distress, appears well-nourished.  Cooperative with history and physical exam.       Vascular:  Pedal pulses are palpable bilaterally for both the DP and PT arteries.  CFT < 3 sec.  No edema.  Pedal hair growth noted.       Neuro:  Alert and oriented x 3. Coordinated gait.  Light touch sensation is diminished to the L4, L5, S1 distributions.  No evidence of neurological-based weakness, spasticity, or contracture in the lower extremities.       Derm: Full thickness ulceration to the level of the subcutaneous fat, sub right 1st metatarsal head.  Base is granular.  Prolific hyperkeratotic eschar around the edges.  No purulence. The wound does not probe to bone.  1.0cm  X 0.8cm oval, x 0.2cm deep.     Ulceration, plantar lateral right 5th metatarsal head:  1.5cm L x 1.0cm W x 0.1cm deep, toe the level of deeper skin.   Inverted triangular shaped.     Neither wound shows any surrounding erythema, malodor, purulence.        Musculoskeletal:    Lower extremity muscle strength is normal.  Patient is ambulatory without an assistive device or brace " .  No gross deformities.  There is b/l limited ankle dorsiflexion with knee extended.   ROM is increased with flexion of the knee b/l.  Digital contractures.     Previous X-ray Right foot:  I personally reviewed the 4 weight bearing views.  Partial amputation of the hallux.  Digital contractures. No evidence of osteomyelitis.     IMPRESSION: Previous amputation of the distal first phalanx. Relative  soft tissue swelling throughout the first digit. Limited osseous  detail underlying the first metatarsal head as well as fifth  metatarsal head on lateral radiograph. No obvious destruction or  lucencies at these locations to suggest osteomyelitis. The distal  aspect of first proximal phalanx is truncated, especially at the  medial margin, though this may be postoperative, clinical correlation  necessary regarding any possibility of osteomyelitis at this specific  location. No acute fracture.     MD Valentino BARNETT DPM, CLYDE, MS [facility]/Foot & Ankle Surgery

## 2017-12-20 RX ORDER — INSULIN ASPART 100 [IU]/ML
INJECTION, SOLUTION INTRAVENOUS; SUBCUTANEOUS
Qty: 30 ML | Refills: 0 | Status: SHIPPED | OUTPATIENT
Start: 2017-12-20 | End: 2018-01-31

## 2017-12-20 NOTE — TELEPHONE ENCOUNTER
Letter sent to pt to schedule office visit and fasting labs  Cara Cheng RN, BS  Clinical Nurse Triage.

## 2017-12-20 NOTE — TELEPHONE ENCOUNTER
Routing refill request to provider for review/approval because:  Pt past due for DM f/u and labs out of range     OK for refill?    Ashtyn Stein, RN

## 2017-12-20 NOTE — TELEPHONE ENCOUNTER
Requested Prescriptions   Pending Prescriptions Disp Refills     NOVOLOG FLEXPEN 100 UNIT/ML soln [Pharmacy Med Name: NOVOLOG FLEXPEN INJ 5X3ML ([facility])] 30 mL 0    Last Written Prescription Date:  10/24/17  Last Fill Quantity: 30,  # refills: 0   Last Office Visit with [facility], [facility] or [facility] prescribing provider:  3/31/2017   Future Office Visit:    Next 5 appointments (look out 90 days)     Dec 27, 2017  9:30 AM CST   PHYSICAL with Aden Lopez MD   [facility]    [address] [place] [address] [phone] [phone]            Anthony 15, 2018  7:00 AM CST   Return Visit with Valentino Molina DPM   [facility] ([facility])    [address] [place] [id number] [phone]                Sig: INJECT 10 TO 35 UNITS UNDER THE SKIN THREE TIMES DAILY    Short Acting Insulin Protocol Failed    12/17/2017  1:21 PM       Failed - Microalbumin on file in past 12 months    Recent Labs   Lab Test  12/05/16   1500  12/17/14   MICROALB   --    --   10   MICROL  5   < >   --    UMALCR  9.87   < >   --     < > = values in this interval not displayed.          Failed - HgbA1C in past 3 or 6 months    Recent Labs   Lab Test  03/31/17   1006   A1C  7.5*          Passed - Blood pressure less than 140/90 in past 6 months    BP Readings from Last 3 Encounters:   12/18/17 156/84   11/27/17 140/70   11/22/17 167/86          Passed - LDL on file in past 12 months    Recent Labs   Lab Test  03/31/17   1006   LDL  53          Passed - Serum creatinine on file in past 12 months    Recent Labs   Lab Test  11/22/17   0605   CR  0.88          Passed - Patient is age 18 or older       Passed - Recent visit with authorizing provider's specialty in past 6 months    Patient had office visit in the last 6 months or has a visit in the next 30 days with authorizing provider.  See chart review.

## 2017-12-26 DIAGNOSIS — L21.9 SEBORRHEIC DERMATITIS: ICD-10-CM

## 2017-12-27 ENCOUNTER — OFFICE VISIT (OUTPATIENT)
Dept: FAMILY MEDICINE | Facility: CLINIC | Age: 57
End: 2017-12-27
Payer: COMMERCIAL

## 2017-12-27 VITALS
HEART RATE: 60 BPM | SYSTOLIC BLOOD PRESSURE: 138 MMHG | OXYGEN SATURATION: 94 % | HEIGHT: 71 IN | WEIGHT: 249 LBS | RESPIRATION RATE: 12 BRPM | DIASTOLIC BLOOD PRESSURE: 88 MMHG | TEMPERATURE: 97.2 F | BODY MASS INDEX: 34.86 KG/M2

## 2017-12-27 DIAGNOSIS — Z00.00 ROUTINE GENERAL MEDICAL EXAMINATION AT A HEALTH CARE FACILITY: ICD-10-CM

## 2017-12-27 DIAGNOSIS — E66.01 MORBID OBESITY, UNSPECIFIED OBESITY TYPE (H): ICD-10-CM

## 2017-12-27 DIAGNOSIS — Z79.4 TYPE 2 DIABETES MELLITUS WITH DIABETIC NEUROPATHY, WITH LONG-TERM CURRENT USE OF INSULIN (H): Primary | ICD-10-CM

## 2017-12-27 DIAGNOSIS — E11.40 TYPE 2 DIABETES MELLITUS WITH DIABETIC NEUROPATHY, WITH LONG-TERM CURRENT USE OF INSULIN (H): Primary | ICD-10-CM

## 2017-12-27 DIAGNOSIS — M75.42 IMPINGEMENT SYNDROME OF SHOULDER REGION, LEFT: ICD-10-CM

## 2017-12-27 LAB — HBA1C MFR BLD: 9.6 % (ref 4.3–6)

## 2017-12-27 PROCEDURE — 84443 ASSAY THYROID STIM HORMONE: CPT | Performed by: FAMILY MEDICINE

## 2017-12-27 PROCEDURE — 80053 COMPREHEN METABOLIC PANEL: CPT | Performed by: FAMILY MEDICINE

## 2017-12-27 PROCEDURE — 80061 LIPID PANEL: CPT | Performed by: FAMILY MEDICINE

## 2017-12-27 PROCEDURE — 99213 OFFICE O/P EST LOW 20 MIN: CPT | Mod: 25 | Performed by: FAMILY MEDICINE

## 2017-12-27 PROCEDURE — 82043 UR ALBUMIN QUANTITATIVE: CPT | Performed by: FAMILY MEDICINE

## 2017-12-27 PROCEDURE — 36415 COLL VENOUS BLD VENIPUNCTURE: CPT | Performed by: FAMILY MEDICINE

## 2017-12-27 PROCEDURE — 99396 PREV VISIT EST AGE 40-64: CPT | Performed by: FAMILY MEDICINE

## 2017-12-27 PROCEDURE — 83036 HEMOGLOBIN GLYCOSYLATED A1C: CPT | Performed by: FAMILY MEDICINE

## 2017-12-27 NOTE — PROGRESS NOTES
SUBJECTIVE:   CC: Crispin Rojas is an 57 year old male who presents for preventative health visit.     Physical   Annual:     Getting at least 3 servings of Calcium per day::  Yes    Bi-annual eye exam::  Yes    Dental care twice a year::  Yes    Sleep apnea or symptoms of sleep apnea::  None    Diet::  Diabetic    Frequency of exercise::  None    Taking medications regularly::  Yes    Medication side effects::  None    Additional concerns today::  YES              Past Medical History:   Diagnosis Date     Cerebral infarction (H)      Chronic infection      H/O heartburn      History of heartburn      Hypertension      Numbness and tingling      Obesity      Renal stone      Type II or unspecified type diabetes mellitus without mention of complication, not stated as uncontrolled        Past Surgical History:   Procedure Laterality Date     AMPUTATE FOOT Left 8/18/2016    Procedure: AMPUTATE FOOT;  Surgeon: Jack Younger DPM;  Location: [facility]     AMPUTATE TOE(S) Right 2/1/2016    Procedure: AMPUTATE TOE(S);  Surgeon: Rachelle Manriquez DPM, Pod;  Location: [facility]     ANGIOGRAM       COLONOSCOPY       COMBINED CYSTOSCOPY, RETROGRADES, URETEROSCOPY, INSERT STENT Left 8/21/2016    Procedure: COMBINED CYSTOSCOPY, RETROGRADES, URETEROSCOPY, INSERT STENT;  Surgeon: Aretmio Valenzuela MD;  Location: [facility]     COMBINED CYSTOSCOPY, RETROGRADES, URETEROSCOPY, LASER HOLMIUM LITHOTRIPSY URETER(S), INSERT STENT Left 10/3/2016    Procedure: COMBINED CYSTOSCOPY, RETROGRADES, URETEROSCOPY, LASER HOLMIUM LITHOTRIPSY URETER(S), INSERT STENT;  Surgeon: Artemio Valenzuela MD;  Location: [facility]     EXTRACORPOREAL SHOCK WAVE LITHOTRIPSY (ESWL) Left 9/8/2016    Procedure: EXTRACORPOREAL SHOCK WAVE LITHOTRIPSY (ESWL);  Surgeon: Artemio Valenzuela MD;  Location: [facility]     RECESSION GASTROCNEMIUS Right 2/1/2016    Procedure: RECESSION GASTROCNEMIUS;  Surgeon: Rachelle Manriquez DPM, Pod;  Location: [facility]       Family History    Problem Relation Age of Onset     Arthritis Mother      SLE     Connective Tissue Disorder Mother      lupus     DIABETES Mother      CEREBROVASCULAR DISEASE Mother      CANCER Mother      DIABETES Father      DIABETES Maternal Grandfather      DIABETES Sister        Social History   Substance Use Topics     Smoking status: Never Smoker     Smokeless tobacco: Never Used     Alcohol use 0.0 oz/week     0 Standard drinks or equivalent per week      Comment: rare---red wine 2x per week               Today's PHQ-2 Score: PHQ-2 ( 1999 [employer]) 12/26/2017   Q1: Little interest or pleasure in doing things 0   Q2: Feeling down, depressed or hopeless 0   PHQ-2 Score 0   Q1: Little interest or pleasure in doing things Not at all   Q2: Feeling down, depressed or hopeless Not at all   PHQ-2 Score 0       Abuse: Current or Past(Physical, Sexual or Emotional)- No  Do you feel safe in your environment - Yes    Social History   Substance Use Topics     Smoking status: Never Smoker     Smokeless tobacco: Never Used     Alcohol use 0.0 oz/week     0 Standard drinks or equivalent per week      Comment: rare---red wine 2x per week     Alcohol Use 12/26/2017   If you drink alcohol, do you typically have greater than 3 drinks per day OR greater than 7 drinks per week?   No       Last PSA:   PSA   Date Value Ref Range Status   12/05/2016 0.31 0 - 4 ug/L Final     Comment:     Assay Method:  Chemiluminescence using Siemens Vista analyzer       Reviewed orders with patient. Reviewed health maintenance and updated orders accordingly - Yes  BP Readings from Last 3 Encounters:   12/27/17 159/83   12/18/17 156/84   11/27/17 140/70    Wt Readings from Last 3 Encounters:   12/27/17 249 lb (112.9 kg)   12/18/17 249 lb (112.9 kg)   11/27/17 250 lb (113.4 kg)          Shoulder pain no acute  injury for  Years ,worse over past 12  weeks.    Mechanism is abduction pain,     Repetative stress activity is none    This is not  the dominant  arm      Painful arc syndrome is present     No associated neck pain. Moderate trapezius discomfort is noted with  shoulder shrug and axillary nerve is intact. Full neck ROM    Xrays show will get preferred views at [facility] due to his restricted movement     no associated epicondylitis at the elbow    No apprehension or rotational instability.    Specific rotator cuff defect or tenderness is not  Palpable.    SUBJECTIVE:        HPI: Longtime Diabetes Mellitus, previous partial foot amputation  Got his new meter and can't get consistency in readings ??real vs illusory--check with 24 hour monitor and get instructins from CDE  Med review, work on weight loss, work on exercise when he can  Foot exam: sees Podiatry for neuropathy and foot wound still healing         PROBLEM LIST:                   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     CARDIOVASCULAR SCREENING; LDL GOAL LESS THAN 100     Ulcer of lower limb, right, with necrosis of muscle (H)     Morbid obesity, unspecified obesity type (H)     Other specified transient cerebral ischemias     Osteomyelitis (H)     Calculus of kidney     Chronic left shoulder pain     Cervicalgia     Type 2 diabetes mellitus with peripheral neuropathy (H)       PAST MEDICAL HISTORY:                  Past Medical History:   Diagnosis Date     Cerebral infarction (H)      Chronic infection      H/O heartburn      History of heartburn      Hypertension      Numbness and tingling      Obesity      Renal stone      Type II or unspecified type diabetes mellitus without mention of complication, not stated as uncontrolled        PAST SURGICAL HISTORY:                  Past Surgical History:   Procedure Laterality Date     AMPUTATE FOOT Left 8/18/2016    Procedure: AMPUTATE FOOT;  Surgeon: Jack Younger DPM;  [facility]: [facility]     AMPUTATE TOE(S) Right 2/1/2016    Procedure: AMPUTATE TOE(S);  Surgeon: Rachelle Manriquez DPM, Pod;   Location: [facility]     ANGIOGRAM       COLONOSCOPY       COMBINED CYSTOSCOPY, RETROGRADES, URETEROSCOPY, INSERT STENT Left 8/21/2016    Procedure: COMBINED CYSTOSCOPY, RETROGRADES, URETEROSCOPY, INSERT STENT;  Surgeon: Artemio Valenzuela MD;  Location: [facility]     COMBINED CYSTOSCOPY, RETROGRADES, URETEROSCOPY, LASER HOLMIUM LITHOTRIPSY URETER(S), INSERT STENT Left 10/3/2016    Procedure: COMBINED CYSTOSCOPY, RETROGRADES, URETEROSCOPY, LASER HOLMIUM LITHOTRIPSY URETER(S), INSERT STENT;  Surgeon: Artemio Valenzuela MD;  [facility]     EXTRACORPOREAL SHOCK WAVE LITHOTRIPSY (ESWL) Left 9/8/2016    Procedure: EXTRACORPOREAL SHOCK WAVE LITHOTRIPSY (ESWL);  Surgeon: Artemio Valenzuela MD;  Location: [facility]     RECESSION GASTROCNEMIUS Right 2/1/2016    Procedure: RECESSION GASTROCNEMIUS;  Surgeon: Rachelle Manriquez, DPM, Pod;  Location: [facility]       CURRENT MEDICATIONS:                  Current Outpatient Prescriptions   Medication Sig Dispense Refill     blood glucose (NO BRAND SPECIFIED) lancets standard Use to test blood sugar 3 times daily or as directed. 100 each 11     NOVOLOG FLEXPEN 100 UNIT/ML soln INJECT 10 TO 35 UNITS UNDER THE SKIN THREE TIMES DAILY 30 mL 0     metFORMIN (GLUCOPHAGE-XR) 500 MG 24 hr tablet TAKE 2 TABLETS BY MOUTH TWICE DAILY WITH MEALS 120 tablet 0     atorvastatin (LIPITOR) 40 MG tablet TAKE 1 TABLET BY MOUTH EVERY DAY 90 tablet 1     amLODIPine (NORVASC) 5 MG tablet TAKE 1 TABLET(5 MG) BY MOUTH DAILY 90 tablet 0     NOVOLOG FLEXPEN 100 UNIT/ML soln INJECT 10 TO 35 UNITS UNDER THE SKIN THREE TIMES DAILY 30 mL 0     order for DME CAM boot, Large 1 Device 0     insulin glargine (LANTUS SOLOSTAR) 100 UNIT/ML injection Take up to 50 units BID 60 mL 1     sitagliptin (JANUVIA) 100 MG tablet Take 1 tablet (100 mg) by mouth daily 90 tablet 3     lisinopril (PRINIVIL/ZESTRIL) 20 MG tablet Take 1 tablet (20 mg) by mouth daily 90 tablet 3     gabapentin (NEURONTIN) 100 MG capsule Take 300 mg  "by mouth At Bedtime       CIALIS 5 MG tablet TAKE 1 TABLET BY MOUTH EVERY DAY AS NEEDED 30 tablet 0     blood glucose monitoring (NO BRAND SPECIFIED) meter device kit Use to test blood sugar 3 times daily or as directed. 1 kit 0     blood glucose monitoring (NO BRAND SPECIFIED) test strip Use to test blood sugars 3 times daily or as directed 300 strip 3     ketoconazole (NIZORAL) 2 % shampoo Apply to the affected area and wash off after 5 minutes. 120 mL 11     Co-Enzyme Q10 100 MG CAPS Take 100 mg by mouth daily        Cholecalciferol (VITAMIN D3 PO) Take 1,000 Units by mouth daily        Multiple Vitamins-Minerals (MULTIVITAMIN PO) Take 1 tablet by mouth daily        Omega-3 Fatty Acids (OMEGA-3 FISH OIL PO) Take 2 g by mouth daily        lancets thin MISC 1 each 3 times daily. 300 each 3     Insulin Syringe-Needle U-100 (BD INSULIN SYRINGE ULTRAFINE) 30G X 1/2\" 0.3 ML MISC 1 Syringe daily. Use one syringe  daily or as directed. 100 each prn             FAMILY HISTORY:                   Family History   Problem Relation Age of Onset     Arthritis Mother      SLE     Connective Tissue Disorder Mother      lupus     DIABETES Mother      CEREBROVASCULAR DISEASE Mother      CANCER Mother      DIABETES Father      DIABETES Maternal Grandfather      DIABETES Sister        HEALTH MAINTENANCE:                    REVIEW OF OUTSIDE RECORDS: NO    REVIEW OF SYSTEMS:    NVS:no headache or balance issus  INTEG:no moles or new rashes  LYMPH:no nodes or night sweats    EXAM:    /88 (BP Location: Left arm, Patient Position: Chair, Cuff Size: Adult Large)  Pulse 60  Temp 97.2  F (36.2  C) (Oral)  Resp 12  Ht 5' 11\" (1.803 m)  Wt 249 lb (112.9 kg)  SpO2 94%  BMI 34.73 kg/m2  GENERAL APPEARANCE: healthy, alert and no distress   EXAM:  GENERAL APPEARANCE: healthy, alert and no distress  EYES: EOMI,  PERRL  HENT: ear canals and TM's normal and nose and mouth without ulcers or lesions  NECK: no adenopathy, no asymmetry, " masses, or scars and thyroid normal to palpation  RESP: lungs clear to auscultation - no rales, rhonchi or wheezes  CV: regular rates and rhythm, normal S1 S2, no S3 or S4 and no murmur, click or rub -  ABDOMEN:  soft, nontender, no HSM or masses and bowel sounds normal  Rectal exam: prostate symmetric w/o nodularity, no masses palpated  GU_male: testicles normal without atrophy or masses  MS: extremities normal- no gross deformities noted, no evidence of inflammation in joints, FROM in all extremities.  SKIN: no suspicious lesions or rashes  NEURO: Normal strength and tone, sensory exam grossly normal, mentation intact and speech normal  PSYCH: mentation appears normal and affect normal/bright  BACK:no tenderness or pain on straight let raise           ASSESSMENT/PLAN: Preventive health exam with evaluation of     1. Type 2 diabetes mellitus with diabetic neuropathy, with long-term current use of insulin (H)    2. Morbid obesity, unspecified obesity type (H)    3. Impingement syndrome of shoulder region, left      Ortho evalutation  CDE   Podiatry                         I have discussed with patient the risks, benefits, medications, treatment options and modalities.   I have instructed the patient to call or schedule a follow-up appointment if any problems or failure to improve.                                       Reviewed and updated as needed this visit by clinical staff         Reviewed and updated as needed this visit by Provider          Review of Systems  C: NEGATIVE for fever, chills, change in weight  I: NEGATIVE for worrisome rashes, moles or lesions  E: NEGATIVE for vision changes or irritation  ENT: NEGATIVE for ear, mouth and throat problems  R: NEGATIVE for significant cough or SOB  CV: NEGATIVE for chest pain, palpitations or peripheral edema  GI: NEGATIVE for nausea, abdominal pain, heartburn, or change in bowel habits   male: negative for dysuria, hematuria, decreased urinary stream, erectile  "dysfunction, urethral discharge  M: NEGATIVE for significant arthralgias or myalgia  N: NEGATIVE for weakness, dizziness or paresthesias  E: NEGATIVE for temperature intolerance, skin/hair changes  H: NEGATIVE for bleeding problems  P: NEGATIVE for changes in mood or affect    OBJECTIVE:   /83 (BP Location: Left arm, Patient Position: Chair, Cuff Size: Adult Large)  Pulse 60  Temp 97.2  F (36.2  C) (Oral)  Resp 12  Ht 5' 11\" (1.803 m)  Wt 249 lb (112.9 kg)  SpO2 94%  BMI 34.73 kg/m2      Physical Exam  GENERAL: healthy, alert and no distress  EYES: Eyes grossly normal to inspection, PERRL and conjunctivae and sclerae normal  HENT: ear canals and TM's normal, nose and mouth without ulcers or lesions  NECK: no adenopathy, no asymmetry, masses, or scars and thyroid normal to palpation  RESP: lungs clear to auscultation - no rales, rhonchi or wheezes  CV: regular rate and rhythm, normal S1 S2, no S3 or S4, no murmur, click or rub, no peripheral edema and peripheral pulses strong  ABDOMEN: soft, nontender, no hepatosplenomegaly, no masses and bowel sounds normal   (male): normal male genitalia without lesions or urethral discharge, no hernia  MS: no gross musculoskeletal defects noted, no edema  SKIN: no suspicious lesions or rashes  NEURO: Normal strength and tone, mentation intact and speech normal  PSYCH: mentation appears normal, affect normal/bright    ASSESSMENT/PLAN:   1. Type 2 diabetes mellitus with diabetic neuropathy, with long-term current use of insulin (H)    - DIABETES EDUCATOR REFERRAL: cant' track his glucose well on his new meter, may be very labile, check 24 hour monitor and troubleshoot the meter  - Hemoglobin A1c  - Lipid panel reflex to direct LDL Fasting  - TSH with free T4 reflex  - Albumin Random Urine Quantitative with Creat Ratio    2. Morbid obesity, unspecified obesity type (H)  Has put on weight with a busy work schedule and issues with his foot wound area breaking down  - " "Comprehensive metabolic panel  - Lipid panel reflex to direct LDL Fasting  - TSH with free T4 reflex    COUNSELING:   Reviewed preventive health counseling, as reflected in patient instructions       Regular exercise       Healthy diet/nutrition       Vision screening           reports that he has never smoked. He has never used smokeless tobacco.      Estimated body mass index is 34.73 kg/(m^2) as calculated from the following:    Height as of 12/18/17: 5' 11\" (1.803 m).    Weight as of 12/18/17: 249 lb (112.9 kg).   Weight management plan: Discussed healthy diet and exercise guidelines and patient will follow up in 3 months in clinic to re-evaluate.    Counseling Resources:  ATP IV Guidelines  Pooled Cohorts Equation Calculator  FRAX Risk Assessment  ICSI Preventive Guidelines  Dietary Guidelines for Americans, 2010  USDA's MyPlate  ASA Prophylaxis  Lung CA Screening    Aden Lopez MD  [facility]  Answers for HPI/ROS submitted by the patient on 12/26/2017   PHQ-2 Score: 0    "

## 2017-12-27 NOTE — MR AVS SNAPSHOT
After Visit Summary   12/27/2017    Crispin Rojas    MRN: [id number]           Patient Information     Date Of Birth          1960        Visit Information        Provider Department      12/27/2017 9:30 AM Aden Lopez MD [facility]        Today's Diagnoses     Type 2 diabetes mellitus with diabetic neuropathy, with long-term current use of insulin (H)    -  1    Routine general medical examination at a health care facility        Morbid obesity, unspecified obesity type (H)        Impingement syndrome of shoulder region, left          Care Instructions      Preventive Health Recommendations  Male Ages 50 - 64    Yearly exam:             See your health care provider every year in order to  o   Review health changes.   o   Discuss preventive care.    o   Review your medicines if your doctor has prescribed any.     Have a cholesterol test every 5 years, or more frequently if you are at risk for high cholesterol/heart disease.     Have a diabetes test (fasting glucose) every three years. If you are at risk for diabetes, you should have this test more often.     Have a colonoscopy at age 50, or have a yearly FIT test (stool test). These exams will check for colon cancer.      Talk with your health care provider about whether or not a prostate cancer screening test (PSA) is right for you.    You should be tested each year for STDs (sexually transmitted diseases), if you re at risk.     Shots: Get a flu shot each year. Get a tetanus shot every 10 years.     Nutrition:    Eat at least 5 servings of fruits and vegetables daily.     Eat whole-grain bread, whole-wheat pasta and brown rice instead of white grains and rice.     Talk to your provider about Calcium and Vitamin D.     Lifestyle    Exercise for at least 150 minutes a week (30 minutes a day, 5 days a week). This will help you control your weight and prevent disease.     Limit alcohol to one drink per day.     No  smoking.     Wear sunscreen to prevent skin cancer.     See your dentist every six months for an exam and cleaning.     See your eye doctor every 1 to 2 years.            Follow-ups after your visit        Additional Services     DIABETES EDUCATOR REFERRAL       DIABETES SELF MANAGEMENT TRAINING (DSMT)      Your provider has referred you to Diabetes Education: FMG: Diabetes Education - All [facility] [phone]   [email or web address]     If an urgent visit is needed or A1C is above 12, Care Team to call the Diabetes  Education Team at [phone] or send an In Basket message to the Diabetes Education Pool (P DIAB ED-PATIENT CARE).    A  will call you to make your appointment. If it has been more than 3 business days since your referral was placed, please call the above phone number to schedule.    Type of training and number of hours: insulin dependent, difficulties with monitoring and needs 24 hour monitor     Medicare covers: 10 hours of initial DSMT in 12 month period from the time of first visit, plus 2 hours of follow-up DSMT annually, and additional hours as requested for insulin training.    Diabetes Type: Type 2 - On Insulin             Diabetes Co-Morbidities: atherosclerotic cardiovascular disease               A1C Goal:  <8.0       A1C is: Lab Results       Component                Value               Date                       A1C                      7.5                 03/31/2017              Diabetes Education Topics: Diagnostic Continuous Glucose Monitoring     Special Educational Needs Requiring Individual DSMT: Additional Insulin Training       MEDICAL NUTRITION THERAPY (MNT) for Diabetes    Medical Nutrition Therapy with a Registered Dietitian can be provided in coordination with Diabetes Self-Management Training to assist in achieving optimal diabetes management.    MNT Type and Hours: Previous diagnosis: Annual follow-up MNT - 2  hours                       Medicare will cover: 3 hours initial MNT in 12 month period after first visit, plus 2 hours of follow-up MNT annually    Please be aware that coverage of these services is subject to the terms and limitations of your health insurance plan.  Call member services at your health plan to determine Diabetes Self-Management Training (Codes  &amp; ) and Medical Nutrition Therapy (Codes 35157 & 68508) benefits and ask which blood glucose monitor brands are covered by your plan.  Please bring the following with you to your appointment:    (1)  List of current medications   (2)  List of Blood Glucose Monitor brands that are covered by your insurance plan  (3)  Blood Glucose Monitor and log book  (4)   Food records for the 3 days prior to your visit    The Certified Diabetes Educator may make diabetes medication adjustments per the CDE Protocol and Collaborative Practice Agreement.            ORTHO  REFERRAL       [facility] is referring you to the Orthopedic  Services at [facility].       The  Representative will assist you in the coordination of your Orthopedic and Musculoskeletal Care as prescribed by your physician.    The  Representative will call you within 1 business day to help schedule your appointment, or you may contact the  Representative at:    All areas ~ [phone]     Type of Referral : Surgical / Specialist       Timeframe requested: Routine    Coverage of these services is subject to the terms and limitations of your health insurance plan.  Please call member services at your health plan with any benefit or coverage questions.      If X-rays, CT or MRI's have been performed, please contact the facility where they were done to arrange for , prior to your scheduled appointment.  Please bring this referral request to your appointment and present it to your specialist.                   Your next 10 appointments already scheduled     Jan 11, 2018  8:30 AM CST   Diabetic Education with June Walker   [facility]   [place] [facility]    [address] [place] [address] [phone]            Anthony 15, 2018  7:00 AM CST   Return Visit with Valentino Molina DPM   [facility] ([facility])    [address] [id number] [phone]            Mar 20, 2018  8:45 AM CDT   SHORT with Aden Lopez MD   [facility]    [address] [facility] [address] [phone]              Who to contact     If you have questions or need follow up information about today's clinic visit or your schedule please contact [facility] directly at [phone].  Normal or non-critical lab and imaging results will be communicated to you by Tradiiohart, letter or phone within 4 business days after the clinic has received the results. If you do not hear from us within 7 days, please contact the clinic through InsightsOnet or phone. If you have a critical or abnormal lab result, we will notify you by phone as soon as possible.  Submit refill requests through MedVentive or call your pharmacy and they will forward the refill request to us. Please allow 3 business days for your refill to be completed.          Additional Information About Your Visit        Tradiiohart Information     MedVentive gives you secure access to your electronic health record. If you see a primary care provider, you can also send messages to your care team and make appointments. If you have questions, please call your primary care clinic.  If you do not have a primary care provider, please call [phone] and they will assist you.        Care EveryWhere ID     This is your Care EveryWhere ID. This could be used by other organizations to access  "your [place] medical records  [id number]        Your Vitals Were     Pulse Temperature Respirations Height Pulse Oximetry BMI (Body Mass Index)    60 97.2  F (36.2  C) (Oral) 12 5' 11\" (1.803 m) 94% 34.73 kg/m2       Blood Pressure from Last 3 Encounters:   12/27/17 138/88   12/18/17 156/84   11/27/17 140/70    Weight from Last 3 Encounters:   12/27/17 249 lb (112.9 kg)   12/18/17 249 lb (112.9 kg)   11/27/17 250 lb (113.4 kg)              We Performed the Following     Albumin Random Urine Quantitative with Creat Ratio     Comprehensive metabolic panel     DIABETES EDUCATOR REFERRAL     Hemoglobin A1c     Lipid panel reflex to direct LDL Fasting     OFFICE/OUTPT VISIT,EST,LEVL III     ORTHO  REFERRAL     TSH with free T4 reflex          Today's Medication Changes          These changes are accurate as of: 12/27/17 12:56 PM.  If you have any questions, ask your nurse or doctor.               Start taking these medicines.        Dose/Directions    blood glucose lancets standard   Commonly known as:  no brand specified   Used for:  Type 2 diabetes mellitus with diabetic neuropathy, with long-term current use of insulin (H)   Started by:  Aden Lopez MD        Use to test blood sugar 3 times daily or as directed.   Quantity:  100 each   Refills:  11            Where to get your medicines      These medications were sent to [facility] [id number] [place] [address] [address] AT [facility] [address]  [address] [place] [address] [address], [facility] [address]     Phone:  [phone]     blood glucose lancets standard                Primary Care Provider Office Phone # Fax #    Aden Lopez [phone] [address] [facility] [address]        Equal Access to Services     ELI MARTÍNEZ : Hadii geraldine pisanoo Socas, waaxda luqadaha, qaybta kaalmada adeegyada, blanca rhodes. So [facility] [phone].    ATENCIÓN: Si abby mckinley " "gomez disposición servicios gratuitos de asistencia lingüística. Michela weinberg [phone].    We comply with applicable federal civil rights laws and [place] laws. We do not discriminate on the basis of race, color, national origin, age, disability, sex, sexual orientation, or gender identity.            Thank you!     Thank you for choosing [facility]  for your care. Our goal is always to provide you with excellent care. Hearing back from our patients is one way we can continue to improve our services. Please take a few minutes to complete the written survey that you may receive in the mail after your visit with us. Thank you!             Your Updated Medication List - Protect others around you: Learn how to safely use, store and throw away your medicines at www.disposemymeds.org.          This list is accurate as of: 12/27/17 12:56 PM.  Always use your most recent med list.                   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 Use one syringe  daily or as directed.    Type 2 diabetes, HbA1C goal < 8% (H)       blood glucose lancets standard    no brand specified    100 each    Use to test blood sugar 3 times daily or as directed.    Type 2 diabetes mellitus with diabetic neuropathy, with long-term current use of insulin (H)       blood glucose monitoring meter device kit    no brand specified    1 kit    Use to test blood sugar 3 times daily or as directed.    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)       blood " glucose monitoring test strip    no brand specified    300 strip    Use to test blood sugars 3 times daily or as directed    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)       CIALIS 5 MG tablet   Generic drug:  tadalafil     30 tablet    TAKE 1 TABLET BY MOUTH EVERY DAY AS NEEDED    Erectile dysfunction due to diseases classified elsewhere       Co-Enzyme Q10 100 MG Caps      Take 100 mg by mouth daily        gabapentin 100 MG capsule    NEURONTIN     Take 300 mg by mouth At Bedtime        insulin glargine 100 UNIT/ML injection    LANTUS SOLOSTAR    60 mL    Take up to 50 units BID    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)       ketoconazole 2 % shampoo    NIZORAL    120 mL    Apply to the affected area and wash off after 5 minutes.    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)       MULTIVITAMIN PO      Take 1 tablet by mouth daily        * NovoLOG FLEXPEN 100 UNIT/ML injection   Generic drug:  insulin aspart     30 mL    INJECT 10 TO 35 UNITS UNDER THE SKIN THREE TIMES DAILY    Type 2 diabetes mellitus with diabetic peripheral angiopathy and gangrene, with long-term current use of insulin (H)       * NovoLOG FLEXPEN 100 UNIT/ML injection   Generic drug:  insulin aspart     30 mL    INJECT 10 TO 35 UNITS UNDER THE SKIN THREE TIMES DAILY    Type 2 diabetes mellitus with diabetic peripheral angiopathy and gangrene, with long-term current use of insulin (H)       OMEGA-3 FISH OIL PO      Take 2 g by mouth daily        order for DME     1 Device    CAM boot, Large    Type 2 diabetes mellitus with peripheral neuropathy (H), Skin ulcer of right foot with fat layer exposed (H)       sitagliptin  100 MG tablet    JANUVIA    90 tablet    Take 1 tablet (100 mg) by mouth daily    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    Type 2 diabetes, HbA1C goal < 8% (H)       VITAMIN D3 PO      Take 1,000 Units by mouth daily        * Notice:  This list has 2 medication(s) that are the same as other medications prescribed for you. Read the directions carefully, and ask your doctor or other care provider to review them with you.

## 2017-12-28 LAB
ALBUMIN SERPL-MCNC: 3.7 G/DL (ref 3.4–5)
ALP SERPL-CCNC: 57 U/L (ref 40–150)
ALT SERPL W P-5'-P-CCNC: 53 U/L (ref 0–70)
ANION GAP SERPL CALCULATED.3IONS-SCNC: 8 MMOL/L (ref 3–14)
AST SERPL W P-5'-P-CCNC: 40 U/L (ref 0–45)
BILIRUB SERPL-MCNC: 0.5 MG/DL (ref 0.2–1.3)
BUN SERPL-MCNC: 20 MG/DL (ref 7–30)
CALCIUM SERPL-MCNC: 8.3 MG/DL (ref 8.5–10.1)
CHLORIDE SERPL-SCNC: 105 MMOL/L (ref 94–109)
CHOLEST SERPL-MCNC: 96 MG/DL
CO2 SERPL-SCNC: 26 MMOL/L (ref 20–32)
CREAT SERPL-MCNC: 0.83 MG/DL (ref 0.66–1.25)
CREAT UR-MCNC: 95 MG/DL
GFR SERPL CREATININE-BSD FRML MDRD: >90 ML/MIN/1.7M2
GLUCOSE SERPL-MCNC: 255 MG/DL (ref 70–99)
HDLC SERPL-MCNC: 26 MG/DL
LDLC SERPL CALC-MCNC: 37 MG/DL
MICROALBUMIN UR-MCNC: 23 MG/L
MICROALBUMIN/CREAT UR: 24.55 MG/G CR (ref 0–17)
NONHDLC SERPL-MCNC: 70 MG/DL
POTASSIUM SERPL-SCNC: 4.4 MMOL/L (ref 3.4–5.3)
PROT SERPL-MCNC: 7.1 G/DL (ref 6.8–8.8)
SODIUM SERPL-SCNC: 139 MMOL/L (ref 133–144)
TRIGL SERPL-MCNC: 167 MG/DL
TSH SERPL DL<=0.005 MIU/L-ACNC: 1.78 MU/L (ref 0.4–4)

## 2017-12-29 RX ORDER — KETOCONAZOLE 20 MG/ML
SHAMPOO TOPICAL
Qty: 120 ML | Refills: 3 | Status: SHIPPED | OUTPATIENT
Start: 2017-12-29 | End: 2018-04-05

## 2017-12-29 NOTE — TELEPHONE ENCOUNTER
Requested Prescriptions   Pending Prescriptions Disp Refills     ketoconazole (NIZORAL) 2 % shampoo [Pharmacy Med Name: KETOCONAZOLE 2% SHAMPOO 120ML] 120 mL 0     Sig: APPLY TO THE AFFECTED AREA AND WASH OFF AFTER 5 MINUTES.    Antifungal Agents Passed    12/28/2017  1:26 PM       Passed - Recent or future visit with authorizing provider's specialty    Patient had office visit in the last year or has a visit in the next 30 days with authorizing provider.  See chart review.              Passed - Not Fluconazole or Terconazole     If oral Fluconazole or Terconazole, may refill if indicated in progress notes.           Prescription approved per [facility] Refill Protocol.    Tara PATEL RN, BSN, PHN  [employer] RN

## 2018-01-05 DIAGNOSIS — E11.49 DIABETES MELLITUS TYPE 2 WITH NEUROLOGICAL MANIFESTATIONS (H): ICD-10-CM

## 2018-01-05 NOTE — TELEPHONE ENCOUNTER
Crispin calls from [facility] -- 70 degrees warmer than [place] today   Requests Lantus last prescribed by vascular Dr. Mccarthy in May 2017.   States he usually takes 35 units BID but says the prescription needs to be written as:     Pending Prescriptions:                       Disp   Refills    insulin glargine (LANTUS SOLOSTAR) 100 UN*60 mL  1            Sig: Take up to 50 units BID    Will ask PCP to refill.  Keegan Medina, RN

## 2018-01-11 ENCOUNTER — ALLIED HEALTH/NURSE VISIT (OUTPATIENT)
Dept: EDUCATION SERVICES | Facility: CLINIC | Age: 58
End: 2018-01-11
Payer: COMMERCIAL

## 2018-01-11 DIAGNOSIS — E11.49 DIABETES MELLITUS TYPE 2 WITH NEUROLOGICAL MANIFESTATIONS (H): ICD-10-CM

## 2018-01-11 DIAGNOSIS — E11.42 TYPE 2 DIABETES MELLITUS WITH PERIPHERAL NEUROPATHY (H): Primary | ICD-10-CM

## 2018-01-11 PROCEDURE — 95250 CONT GLUC MNTR PHYS/QHP EQP: CPT | Performed by: DIETITIAN, REGISTERED

## 2018-01-11 RX ORDER — METFORMIN HCL 500 MG
TABLET, EXTENDED RELEASE 24 HR ORAL
Qty: 120 TABLET | Refills: 5 | Status: SHIPPED | OUTPATIENT
Start: 2018-01-11 | End: 2018-03-02

## 2018-01-11 NOTE — PATIENT INSTRUCTIONS
1. Plan to wear the LibrePro sensor for 14 days. It is okay to shower, bathe, and swim (up to 3 feet deep for 30 minutes)    2. Continue with your usual diabetes care plan - check blood sugars and take medicines, as prescribed.    3. Keep a log of what you eat and drink, when you take your medications and how much you take, and exercise you do while you are wearing the sensor.    3. Do not cover the sensor with extra adhesive (the small hole in the center of the sensor must remain uncovered)    4. Use a little extra care, especially when getting dressed or exercising, to avoid accidentally loosening or removing the sensor.     5. Remove the sensor if you need to have an MRI or CT scan.         [place] [facility] for the [facility] Area:  For Your Diabetes Education and Nutrition Appointments Call:  [phone]   For Diabetes Education or Nutrition Related Questions:   Phone: [phone]  E-mail: [email or web address]  Fax: [phone]   If you need a medication refill please contact your pharmacy. Please allow 3 business days for your refills to be completed.    Instructions for emailing the Diabetes Educators    If you need to communicate a non-urgent message to a Diabetes Educator via email, please send to [email or web address].    Please follow the following email guidelines:    Subject line: Secure: your clinic name (example: Secure: [facility])  In the email please include: First name, middle initial, last name and date of birth.    We will be in touch with you within one (1) business day.

## 2018-01-11 NOTE — LETTER
"    1/11/2018         RE: Crispin Rojas  [address] [facility] [address]        Dear Colleague,    Thank you for referring your patient, Crispin Rojas, to the [facility] [facility]. Please see a copy of my visit note below.    Diabetes Self Management Training: Professional Continuous Glucose Monitor Insertion    Time limited- pt 20 min late due to weather.    SUBJECTIVE:   Crispin Rojas is accompanied by self    Patient concerns: reports since he started using new meter- \"he can't trust it\", used to have an Accu-chek Advantage (>5 yrs old), but now using a Marcia meter due to insurance formulary change. Pt did not bring meters with him today.    Diabetes Medication(s)     Biguanides Sig    metFORMIN (GLUCOPHAGE-XR) 500 MG 24 hr tablet TAKE 2 TABLETS BY MOUTH TWICE DAILY WITH MEALS    Dipeptidyl Peptidase-4 (DPP-4) Inhibitors Sig    sitagliptin (JANUVIA) 100 MG tablet Take 1 tablet (100 mg) by mouth daily    Insulin Sig    insulin glargine (LANTUS SOLOSTAR) 100 UNIT/ML injection Take up to 50 units BID    NOVOLOG FLEXPEN 100 UNIT/ML soln INJECT 10 TO 35 UNITS UNDER THE SKIN THREE TIMES DAILY    NOVOLOG FLEXPEN 100 UNIT/ML soln INJECT 10 TO 35 UNITS UNDER THE SKIN THREE TIMES DAILY          OBJECTIVE:    Lab Results:  Lab Results   Component Value Date    A1C 9.6 12/27/2017      Lab Results   Component Value Date     12/27/2017     Lab Results   Component Value Date    HDL 26 12/27/2017       Vitals:  There were no vitals taken for this visit.    ASSESSMENT:    LibrePro sensor started today. (Lot # [id number], Serial # [id number], Expiration date 2018-01-31) Sensor was inserted with no resistance or bleeding at insertion site.    Pt will plan to wear the sensor for 14 days.    WRITTEN AND VERBAL INFORMATION GIVEN TO SUPPORT UNDERSTANDING OF:  LibrePro CGM: Sensor insertion, intention of monitoring for 14 days. Keep records of BG, food intake, exercise, and medication " dosing during wear.       Patient verbalizes understanding of how to remove sensor and all instructions provided.     Educational and other materials:  Food/exercise/medication log sheets  Contact information  Return Check List    PLAN:  Pt was given instructions for tracking BG, medications, food intake and activity.    See Patient Instructions, AVS printed and provided to patient today.    Follow-up:    CDE follow up scheduled for CGM download 1/26/2018.    June Walker RD, CDE  Diabetes     Time Spent: 30 minutes  Encounter Type: Individual

## 2018-01-11 NOTE — TELEPHONE ENCOUNTER
Last Written Prescription Date:  12/12/17  Last Fill Quantity: 120,  # refills: 0 /  Last Office Visit with ASTRID, ANDERSON or/ [facility] prescribing provider:  12/27/17   Future Office Visit:    Next 5 appointments (look out 90 days)     Anthony 15, 2018  7:00 AM CST   Return Visit with Valentino Molina DPM   [facility] ([facility]    [address] [id number] [phone]            Mar 20, 2018  8:45 AM CDT   SHORT with Aden Lopez MD   [facility] ([facility])    [address] [place] [address] [phone]                 Requested Prescriptions   Pending Prescriptions Disp Refills     metFORMIN (GLUCOPHAGE-XR) 500 MG 24 hr tablet [Pharmacy Med Name: METFORMIN ER 500MG 24HR TABS] 120 tablet 0     Sig: TAKE 2 TABLETS BY MOUTH TWICE DAILY WITH MEALS    Biguanide Agents Passed    1/11/2018 12:48 PM       Passed - Patient's BP is less than 140/90    BP Readings from Last 3 Encounters:   12/27/17 138/88   12/18/17 156/84   11/27/17 140/70                Passed - Patient has documented LDL within the past 12 mos.    Recent Labs   Lab Test  12/27/17   1001   LDL  37            Passed - Patient has had a Microalbumin in the past 12 mos.    Recent Labs   Lab Test  12/27/17   1002  12/17/14   MICROALB   --    --   10   MICROL  23   < >   --    UMALCR  24.55*   < >   --     < > = values in this interval not displayed.            Passed - Patient is age 10 or older       Passed - Patient has documented A1c within the specified period of time.    Recent Labs   Lab Test  12/27/17   1001   A1C  9.6*            Passed - Patient's CR is NOT>1.4 OR Patient's EGFR is NOT<45 within past 12 mos.    Recent Labs   Lab Test  12/27/17   1001   GFRESTIMATED  >90   GFRESTBLACK  >90       Recent Labs   Lab Test  12/27/17   1001   CR  0.83            Passed - Patient does NOT have a diagnosis of CHF.        "Passed - Recent (6 mos) or future visit with authorizing provider's specialty    Patient had office visit in the last 6 months or has a visit in the next 30 days with authorizing provider.  See \"Patient Info\" tab in inbasket, or \"Choose Columns\" in Meds & Orders section of the refill encounter.             "

## 2018-01-11 NOTE — PROGRESS NOTES
"Diabetes Self Management Training: Professional Continuous Glucose Monitor Insertion    Time limited- pt 20 min late due to weather.    SUBJECTIVE:   Crispin Rojas is accompanied by self    Patient concerns: reports since he started using new meter- \"he can't trust it\", used to have an Accu-chek Advantage (>5 yrs old), but now using a Marcia meter due to insurance formulary change. Pt did not bring meters with him today.    Diabetes Medication(s)     Biguanides Sig    metFORMIN (GLUCOPHAGE-XR) 500 MG 24 hr tablet TAKE 2 TABLETS BY MOUTH TWICE DAILY WITH MEALS    Dipeptidyl Peptidase-4 (DPP-4) Inhibitors Sig    sitagliptin (JANUVIA) 100 MG tablet Take 1 tablet (100 mg) by mouth daily    Insulin Sig    insulin glargine (LANTUS SOLOSTAR) 100 UNIT/ML injection Take up to 50 units BID    NOVOLOG FLEXPEN 100 UNIT/ML soln INJECT 10 TO 35 UNITS UNDER THE SKIN THREE TIMES DAILY    NOVOLOG FLEXPEN 100 UNIT/ML soln INJECT 10 TO 35 UNITS UNDER THE SKIN THREE TIMES DAILY          OBJECTIVE:    Lab Results:  Lab Results   Component Value Date    A1C 9.6 12/27/2017      Lab Results   Component Value Date     12/27/2017     Lab Results   Component Value Date    HDL 26 12/27/2017       Vitals:  There were no vitals taken for this visit.    ASSESSMENT:    LibrePro sensor started today. (Lot # [id number], Serial # [id number], Expiration date 2018-01-31) Sensor was inserted with no resistance or bleeding at insertion site.    Pt will plan to wear the sensor for 14 days.    WRITTEN AND VERBAL INFORMATION GIVEN TO SUPPORT UNDERSTANDING OF:  LibrePro CGM: Sensor insertion, intention of monitoring for 14 days. Keep records of BG, food intake, exercise, and medication dosing during wear.       Patient verbalizes understanding of how to remove sensor and all instructions provided.     Educational and other materials:  Food/exercise/medication log sheets  Contact information  Return Check List    PLAN:  Pt was given instructions for " tracking BG, medications, food intake and activity.    See Patient Instructions, AVS printed and provided to patient today.    Follow-up:    CDE follow up scheduled for CGM download 1/26/2018.    June Walker RD, CDE  Diabetes     Time Spent: 30 minutes  Encounter Type: Individual

## 2018-01-11 NOTE — MR AVS SNAPSHOT
After Visit Summary   1/11/2018    Crispin Rojas    MRN: [id number]           Patient Information     Date Of Birth          1960        Visit Information        Provider Department      1/11/2018 8:30 AM June Walker [facility]        Care Instructions    1. Plan to wear the LibrePro sensor for 14 days. It is okay to shower, bathe, and swim (up to 3 feet deep for 30 minutes)    2. Continue with your usual diabetes care plan - check blood sugars and take medicines, as prescribed.    3. Keep a log of what you eat and drink, when you take your medications and how much you take, and exercise you do while you are wearing the sensor.    3. Do not cover the sensor with extra adhesive (the small hole in the center of the sensor must remain uncovered)    4. Use a little extra care, especially when getting dressed or exercising, to avoid accidentally loosening or removing the sensor.     5. Remove the sensor if you need to have an MRI or CT scan.         [facility] for the [facility] [[email or web address]] Area:  For Your Diabetes Education and Nutrition Appointments Call:  [phone]   For Diabetes Education or Nutrition Related Questions:   Phone: [phone]  E-mail: [email or web address]  Fax: [phone]   If you need a medication refill please contact your pharmacy. Please allow 3 business days for your refills to be completed.    Instructions for emailing the Diabetes Educators    If you need to communicate a non-urgent message to a Diabetes Educator via email, please send to [email or web address].    Please follow the following email guidelines:    Subject line: Secure: your clinic name (example: Secure: [facility])  In the email please include: First name, middle initial, last name and date of birth.    We will be in touch with you within one (1) business day.              Follow-ups after your visit        Your next 10 appointments  already scheduled     Jan 12, 2018  8:30 AM CST   New Visit with Puneet Lozano MD   [facility] ([facility])    [address]  [address]  [facility] [address]   [phone]            Anthony 15, 2018  7:00 AM CST   Return Visit with Valentino Molina DPM   [facility]    [address] [id number] [phone]            Jan 26, 2018  9:30 AM CST   Diabetic Education with June Wakler   [facility] ([facility])    [address] [place] [address] [phone]            Mar 20, 2018  8:45 AM CDT   SHORT with Aden Lopez MD   [facility] ([facility])    [address] [place] [address] [phone]              Who to contact     If you have questions or need follow up information about today's clinic visit or your schedule please contact [facility] directly at [phone].  Normal or non-critical lab and imaging results will be communicated to you by MyChart, letter or phone within 4 business days after the clinic has received the results. If you do not hear from us within 7 days, please contact the clinic through [facility] or phone. If you have a critical or abnormal lab result, we will notify you by phone as soon as possible.  Submit refill requests through [facility] or call your pharmacy and they will forward the refill request to us. Please allow 3 business days for your refill to be completed.          Additional Information About Your Visit        [facility] Information     ON[facility]IR gives you secure access to your electronic health record. If you see a primary care provider, you can also send messages to your care team and make appointments. If you have questions, please call your primary care clinic.  If you do not  have a primary care provider, please call [phone] and they will assist you.        Care EveryWhere ID     This is your Care EveryWhere ID. This could be used by other organizations to access your [facility] medical records  [id number]         Blood Pressure from Last 3 Encounters:   12/27/17 138/88   12/18/17 156/84   11/27/17 140/70    Weight from Last 3 Encounters:   12/27/17 112.9 kg (249 lb)   12/18/17 112.9 kg (249 lb)   11/27/17 113.4 kg (250 lb)              Today, you had the following     No orders found for display       Primary Care Provider Office Phone # Fax #    Aden Lopez [phone] [address] [address] [address]        Equal Access to Services     [facility] : Hadii geraldine arechiga hadasho Soomaali, waaxda luqadaha, qaybta kaalmada adeegyada, blanca moran . So [facility] [phone].    ATENCIÓN: Si habla español, tiene a gomez disposición servicios gratuitos de asistencia lingüística. Michela al [phone].    We comply with applicable federal civil rights laws and [place] laws. We do not discriminate on the basis of race, color, national origin, age, disability, sex, sexual orientation, or gender identity.            Thank you!     Thank you for choosing [facility]  for your care. Our goal is always to provide you with excellent care. Hearing back from our patients is one way we can continue to improve our services. Please take a few minutes to complete the written survey that you may receive in the mail after your visit with us. Thank you!             Your Updated Medication List - Protect others around you: Learn how to safely use, store and throw away your medicines at www.disposemymeds.org.          This list is accurate as of: 1/11/18  9:16 AM.  Always use your most recent med list.                   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 Use one syringe  daily or as directed.    Type 2 diabetes, HbA1C goal < 8% (H)       blood glucose lancets standard    no brand specified    100 each    Use to test blood sugar 3 times daily or as directed.    Type 2 diabetes mellitus with diabetic neuropathy, with long-term current use of insulin (H)       blood glucose monitoring meter device kit    no brand specified    1 kit    Use to test blood sugar 3 times daily or as directed.    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)       MULTIVITAMIN PO      Take 1 tablet by mouth daily        * NovoLOG FLEXPEN 100 UNIT/ML injection   Generic drug:  insulin aspart     30 mL    INJECT 10 TO 35 UNITS UNDER THE SKIN THREE TIMES DAILY    Type 2 diabetes mellitus with diabetic peripheral angiopathy and gangrene, with long-term current use of insulin (H)       * NovoLOG FLEXPEN 100 UNIT/ML injection   Generic drug:  insulin aspart     30 mL    INJECT 10 TO 35 UNITS UNDER THE SKIN THREE TIMES DAILY    Type 2 diabetes mellitus with diabetic peripheral angiopathy and gangrene, with long-term current use of insulin (H)       OMEGA-3 FISH OIL PO      Take 2 g by mouth daily        order for DME     1 Device    CAM boot, Large    Type 2 diabetes mellitus with peripheral neuropathy (H), Skin ulcer of right foot with fat layer exposed (H)       sitagliptin 100 MG tablet    JANUVIA    90 tablet    Take 1 tablet (100 mg) by mouth daily    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    Type 2 diabetes, HbA1C goal < 8% (H)       VITAMIN D3 PO      Take 1,000 Units by mouth daily        * Notice:  This list has 2 medication(s) that are the same as other medications prescribed for you. Read the directions carefully, and ask your doctor or other care provider to review them with you.

## 2018-01-11 NOTE — TELEPHONE ENCOUNTER
Routing refill request to provider for review/approval because:  Labs out of range:  See below.     Julissa Escalante RN -- [facility]

## 2018-01-12 ENCOUNTER — RADIANT APPOINTMENT (OUTPATIENT)
Dept: GENERAL RADIOLOGY | Facility: CLINIC | Age: 58
End: 2018-01-12
Attending: ORTHOPAEDIC SURGERY
Payer: COMMERCIAL

## 2018-01-12 ENCOUNTER — OFFICE VISIT (OUTPATIENT)
Dept: ORTHOPEDICS | Facility: CLINIC | Age: 58
End: 2018-01-12
Payer: COMMERCIAL

## 2018-01-12 VITALS
BODY MASS INDEX: 35 KG/M2 | SYSTOLIC BLOOD PRESSURE: 132 MMHG | HEIGHT: 71 IN | DIASTOLIC BLOOD PRESSURE: 74 MMHG | WEIGHT: 250 LBS

## 2018-01-12 DIAGNOSIS — G89.29 CHRONIC LEFT SHOULDER PAIN: ICD-10-CM

## 2018-01-12 DIAGNOSIS — M25.512 CHRONIC LEFT SHOULDER PAIN: ICD-10-CM

## 2018-01-12 DIAGNOSIS — M25.512 CHRONIC LEFT SHOULDER PAIN: Primary | ICD-10-CM

## 2018-01-12 DIAGNOSIS — G89.29 CHRONIC LEFT SHOULDER PAIN: Primary | ICD-10-CM

## 2018-01-12 DIAGNOSIS — M12.812 ROTATOR CUFF ARTHROPATHY, LEFT: ICD-10-CM

## 2018-01-12 PROCEDURE — 99203 OFFICE O/P NEW LOW 30 MIN: CPT | Performed by: ORTHOPAEDIC SURGERY

## 2018-01-12 PROCEDURE — 73030 X-RAY EXAM OF SHOULDER: CPT | Mod: LT | Performed by: ORTHOPAEDIC SURGERY

## 2018-01-12 NOTE — NURSING NOTE
"Chief Complaint   Patient presents with     Shoulder Pain     Left shoulder       Initial /74 (BP Location: Right arm, Patient Position: Sitting, Cuff Size: Adult Regular)  Ht 5' 10.6\" (1.793 m)  Wt 250 lb (113.4 kg)  BMI 35.26 kg/m2 Estimated body mass index is 35.26 kg/(m^2) as calculated from the following:    Height as of this encounter: 5' 10.6\" (1.793 m).    Weight as of this encounter: 250 lb (113.4 kg).  Medication Reconciliation: complete    "

## 2018-01-12 NOTE — LETTER
1/12/2018         RE: Crispin Rojas  [address] [address] [address]        Dear Colleague,    Thank you for referring your patient, Crispin Rojas, to the [facility]. Please see a copy of my visit note below.    HISTORY OF PRESENT ILLNESS:    Crispin Rojas is a 57 year old male who is seen in consultation at the request of Dr. Lopez for left shoulder pain    Present symptoms: Pt states shoulder pain has been present for 3 years, denies any known injury to the shoulder.  Pt states reaching overhead will cause pain deep in joint, feels like a stabbing pain, can start to hurt at rest, keyboarding can cause pain in the shoulder.  When he plays golf, he has had intermittent pain but not consistently. Pain has been the inside the shoulder not in the deltoid region. He is right-hand dominant. His diabetes has been under good control over the last three years although most recent A1c level was noted to have gone up slightly.  Treatments tried to this point: physical therapy  Orthopedic PMH: foot surgeries Of partial amputations from infection    Past Medical History:   Diagnosis Date     Cerebral infarction (H)      Chronic infection      H/O heartburn      History of heartburn      Hypertension      Numbness and tingling      Obesity      Renal stone      Type II or unspecified type diabetes mellitus without mention of complication, not stated as uncontrolled        Past Surgical History:   Procedure Laterality Date     AMPUTATE FOOT Left 8/18/2016    Procedure: AMPUTATE FOOT;  Surgeon: Jack Younger DPM;  Location: [facility] OR     AMPUTATE TOE(S) Right 2/1/2016    Procedure: AMPUTATE TOE(S);  Surgeon: Rachelle Manriquez DPM, Pod;  Location: [facility] OR     ANGIOGRAM       COLONOSCOPY       COMBINED CYSTOSCOPY, RETROGRADES, URETEROSCOPY, INSERT STENT Left 8/21/2016    Procedure: COMBINED CYSTOSCOPY, RETROGRADES, URETEROSCOPY, INSERT STENT;  Surgeon: Artemio Valenzuela MD;   Location: [facility] OR     COMBINED CYSTOSCOPY, RETROGRADES, URETEROSCOPY, LASER HOLMIUM LITHOTRIPSY URETER(S), INSERT STENT Left 10/3/2016    Procedure: COMBINED CYSTOSCOPY, RETROGRADES, URETEROSCOPY, LASER HOLMIUM LITHOTRIPSY URETER(S), INSERT STENT;  Surgeon: Artemio Valenzuela MD;  Location: [facility] OR     EXTRACORPOREAL SHOCK WAVE LITHOTRIPSY (ESWL) Left 9/8/2016    Procedure: EXTRACORPOREAL SHOCK WAVE LITHOTRIPSY (ESWL);  Surgeon: Artemio Valenzuela MD;  Location: [facility] OR     RECESSION GASTROCNEMIUS Right 2/1/2016    Procedure: RECESSION GASTROCNEMIUS;  Surgeon: Rachelle Manriquez, DPM, Pod;  Location: [facility] OR       Family History   Problem Relation Age of Onset     Arthritis Mother      SLE     Connective Tissue Disorder Mother      lupus     DIABETES Mother      CEREBROVASCULAR DISEASE Mother      CANCER Mother      DIABETES Father      DIABETES Maternal Grandfather      DIABETES Sister        Social History     Social History     Marital status:      Spouse name: Sydney     Number of children: 2     Years of education: N/A     Occupational History     marketing [email or web address]     Social History Main Topics     Smoking status: Never Smoker     Smokeless tobacco: Never Used     Alcohol use 0.0 oz/week     0 Standard drinks or equivalent per week      Comment: rare---red wine 2x per week     Drug use: No     Sexual activity: Yes     Partners: Female     Other Topics Concern     Parent/Sibling W/ Cabg, Mi Or Angioplasty Before 65f 55m? No     Social History Narrative       Current Outpatient Prescriptions   Medication Sig Dispense Refill     insulin glargine (LANTUS SOLOSTAR) 100 UNIT/ML injection Take up to 50 units BID 60 mL 1     atorvastatin (LIPITOR) 40 MG tablet TAKE 1 TABLET BY MOUTH EVERY DAY 90 tablet 1     amLODIPine (NORVASC) 5 MG tablet TAKE 1 TABLET(5 MG) BY MOUTH DAILY 90 tablet 0     NOVOLOG FLEXPEN 100 UNIT/ML soln INJECT 10 TO 35 UNITS UNDER THE SKIN THREE TIMES DAILY 30 mL 0      "sitagliptin (JANUVIA) 100 MG tablet Take 1 tablet (100 mg) by mouth daily 90 tablet 3     lisinopril (PRINIVIL/ZESTRIL) 20 MG tablet Take 1 tablet (20 mg) by mouth daily 90 tablet 3     gabapentin (NEURONTIN) 100 MG capsule Take 300 mg by mouth At Bedtime       Co-Enzyme Q10 100 MG CAPS Take 100 mg by mouth daily        Cholecalciferol (VITAMIN D3 PO) Take 1,000 Units by mouth daily        Multiple Vitamins-Minerals (MULTIVITAMIN PO) Take 1 tablet by mouth daily        Omega-3 Fatty Acids (OMEGA-3 FISH OIL PO) Take 2 g by mouth daily        metFORMIN (GLUCOPHAGE-XR) 500 MG 24 hr tablet TAKE 2 TABLETS BY MOUTH TWICE DAILY WITH MEALS (Patient not taking: Reported on 1/12/2018) 120 tablet 5     ketoconazole (NIZORAL) 2 % shampoo APPLY TO THE AFFECTED AREA AND WASH OFF AFTER 5 MINUTES. (Patient not taking: Reported on 1/12/2018) 120 mL 3     blood glucose (NO BRAND SPECIFIED) lancets standard Use to test blood sugar 3 times daily or as directed. 100 each 11     NOVOLOG FLEXPEN 100 UNIT/ML soln INJECT 10 TO 35 UNITS UNDER THE SKIN THREE TIMES DAILY 30 mL 0     order for DME CAM boot, Large 1 Device 0     CIALIS 5 MG tablet TAKE 1 TABLET BY MOUTH EVERY DAY AS NEEDED (Patient not taking: Reported on 1/12/2018) 30 tablet 0     blood glucose monitoring (NO BRAND SPECIFIED) meter device kit Use to test blood sugar 3 times daily or as directed. 1 kit 0     blood glucose monitoring (NO BRAND SPECIFIED) test strip Use to test blood sugars 3 times daily or as directed 300 strip 3     lancets thin MISC 1 each 3 times daily. 300 each 3     Insulin Syringe-Needle U-100 (BD INSULIN SYRINGE ULTRAFINE) 30G X 1/2\" 0.3 ML MISC 1 Syringe daily. Use one syringe  daily or as directed. 100 each prn       Allergies   Allergen Reactions     Niacin Swelling     SIMCOR caused edema in extremities     Simvastatin Swelling     SIMCOR caused edema in extremities       REVIEW OF SYSTEMS:  CONSTITUTIONAL:  NEGATIVE for fever, chills, change in " "weight  INTEGUMENTARY/SKIN:  NEGATIVE for worrisome rashes, moles or lesions  EYES:  NEGATIVE for vision changes or irritation  ENT/MOUTH:  NEGATIVE for ear, mouth and throat problems  RESP:  NEGATIVE for significant cough or SOB  BREAST:  NEGATIVE for masses, tenderness or discharge  CV:  NEGATIVE for chest pain, palpitations or peripheral edema  GI:  NEGATIVE for nausea, abdominal pain, heartburn, or change in bowel habits  :  Negative   MUSCULOSKELETAL:  See HPI above  NEURO:  NEGATIVE for weakness, dizziness or paresthesias  ENDOCRINE:  NEGATIVE for temperature intolerance, skin/hair changes  HEME/ALLERGY/IMMUNE:  NEGATIVE for bleeding problems  PSYCHIATRIC:  NEGATIVE for changes in mood or affect      PHYSICAL EXAM:  /74 (BP Location: Right arm, Patient Position: Sitting, Cuff Size: Adult Regular)  Ht 5' 10.6\" (1.793 m)  Wt 250 lb (113.4 kg)  BMI 35.26 kg/m2  Body mass index is 35.26 kg/(m^2).   GENERAL APPEARANCE: healthy, alert and no distress   HEENT: No apparent thyroid megaly. Clear sclera with normal ocular movement  RESPIRATORY: No labored breathing  SKIN: no suspicious lesions or rashes  NEURO: Normal strength and tone, mentation intact and speech normal  VASCULAR: Good pulses, and capillary refill   LYMPH: no lymphadenopathy   PSYCH:  mentation appears normal and affect normal/bright    MUSCULOSKELETAL:  Presence of scoliosis with a right-sided curvature and asymmetrical rib cage noted  Turning of the neck to the left causes pain along the trapezius  Limited range of motion but fairly symmetrical  Peñaloza elevations limited to 150 and external rotation is limited to 35  bilaterally  Positive arm drop test, left  Positive impingement sign, left  Slightly weak abduction and external rotation strength against resistance, left  Nontender acromioclavicular joint, left     ASSESSMENT:  Chronic impingement syndrome with a type III acromion noted on today's x-rays  Probable mild bilateral frozen " shoulder  Possible rotator cuff tear    PLAN:  We visualized x-rays from today. Presence of type III acromion and acromioclavicular joint DJD were pointed out. We also pointed out the fact that he is quite weak in the left shoulder compared to the right.  Definitely, there is a concern for rotator cuff tear in the left shoulder.  I recommended further evaluation with MRI scan for that reason.  He'll be seen to discuss future treatment options after MRI scan is completed.    Imaging Interpretation:   Three views of the left shoulder demonstrate presence of advanced acromioclavicular joint DJD with more or less complete obliteration of the joint space and presence of type III acromion. Mild sclerosis of the subchondral bone of the glenoid and humeral head is noted. No significant proximal migration of the humeral head is noted. No acute fractures or other osseous pathology noted.      Puneet Lozano MD  Department of Orthopedic Surgery        Disclaimer: This note consists of symbols derived from keyboarding, dictation and/or voice recognition software. As a result, there may be errors in the script that have gone undetected. Please consider this when interpreting information found in this chart.      Again, thank you for allowing me to participate in the care of your patient.        Sincerely,        Puneet Lozano MD

## 2018-01-12 NOTE — MR AVS SNAPSHOT
After Visit Summary   1/12/2018    Crispin Rojas    MRN: [id number]           Patient Information     Date Of Birth          1960        Visit Information        Provider Department      1/12/2018 8:30 AM Puneet Lozano MD [facility] ORTHOPEDIC SURGERY        Today's Diagnoses     Chronic left shoulder pain    -  1    Rotator cuff arthropathy, left          Care Instructions    Please call [facility] [phone] ([address] [facility], [address], [place], [place]) to schedule your appointment if you have not heard from them in two business days    Please follow up in clinic 2-3 business days following the MRI. Please call [phone] to schedule your follow up appointment.                  Follow-ups after your visit        Your next 10 appointments already scheduled     Anthony 15, 2018  7:00 AM CST   [facility]    [address] [id number] [phone]            Jan 26, 2018  9:30 AM CST   Diabetic Education with June Walker   [facility] ([facility])    [address] [place] [id number] [phone]            Mar 20, 2018  8:45 AM CDT   SHORT with Aden Lopez MD   [facility]    [address] [place] [id number] [phone]              Who to contact     If you have questions or need follow up information about today's clinic visit or your schedule please contact [facility] ORTHOPEDIC SURGERY directly at [phone].  Normal or non-critical lab and imaging results will be communicated to you by MyChart, letter or phone within 4 business days after the clinic has received the results. If you do not hear from us within 7 days, please contact the clinic through MyChart or phone. If you  "have a critical or abnormal lab result, we will notify you by phone as soon as possible.  Submit refill requests through [employer] or call your pharmacy and they will forward the refill request to us. Please allow 3 business days for your refill to be completed.          Additional Information About Your Visit        [employer] Information     [employer] gives you secure access to your electronic health record. If you see a primary care provider, you can also send messages to your care team and make appointments. If you have questions, please call your primary care clinic.  If you do not have a primary care provider, please call [phone] and they will assist you.        Care EveryWhere ID     This is your Care EveryWhere ID. This could be used by other organizations to access your [facility] medical records  [id number]        Your Vitals Were     Height BMI (Body Mass Index)                5' 10.6\" (1.793 m) 35.26 kg/m2           Blood Pressure from Last 3 Encounters:   01/12/18 132/74   12/27/17 138/88   12/18/17 156/84    Weight from Last 3 Encounters:   01/12/18 250 lb (113.4 kg)   12/27/17 249 lb (112.9 kg)   12/18/17 249 lb (112.9 kg)               Primary Care Provider Office Phone # Fax #    Aden Ethan Lopez [phone] [address] [facility] [address]        Equal Access to Services     ELI MARTÍNEZ : Hadii geraldine ku hadasho Socas, waaxda luqadaha, qaybta kaalmada sabinayaandrew, blanca moran . So [facility] [phone].    ATENCIÓN: Si habla español, tiene a gomez disposición servicios gratuitos de asistencia lingüística. Michela al [phone].    We comply with applicable federal civil rights laws and [place] laws. We do not discriminate on the basis of race, color, national origin, age, disability, sex, sexual orientation, or gender identity.            Thank you!     Thank you for choosing [facility] ORTHOPEDIC SURGERY  for your care. Our goal is " "always to provide you with excellent care. Hearing back from our patients is one way we can continue to improve our services. Please take a few minutes to complete the written survey that you may receive in the mail after your visit with us. Thank you!             Your Updated Medication List - Protect others around you: Learn how to safely use, store and throw away your medicines at www.disposemymeds.org.          This list is accurate as of: 1/12/18  9:40 AM.  Always use your most recent med list.                   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 Use one syringe  daily or as directed.    Type 2 diabetes, HbA1C goal < 8% (H)       blood glucose lancets standard    no brand specified    100 each    Use to test blood sugar 3 times daily or as directed.    Type 2 diabetes mellitus with diabetic neuropathy, with long-term current use of insulin (H)       blood glucose monitoring meter device kit    no brand specified    1 kit    Use to test blood sugar 3 times daily or as directed.    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)       MULTIVITAMIN PO      Take 1 tablet by mouth daily        * NovoLOG FLEXPEN 100 UNIT/ML injection   Generic drug:  insulin aspart     30 mL    INJECT 10 TO 35 UNITS UNDER THE SKIN THREE TIMES DAILY    Type 2 diabetes mellitus with diabetic peripheral angiopathy and gangrene, with long-term current use of insulin (H)       * NovoLOG FLEXPEN 100 UNIT/ML injection   Generic drug:  insulin aspart     30 mL    INJECT 10 TO 35 UNITS UNDER THE SKIN THREE TIMES DAILY    Type 2 diabetes mellitus with diabetic peripheral angiopathy and gangrene, with long-term current use of insulin (H)       OMEGA-3 FISH OIL PO      Take 2 g by mouth daily        order for DME     1 Device    CAM boot, Large    Type 2 diabetes mellitus with peripheral neuropathy (H), Skin ulcer of right foot with fat layer exposed (H)       sitagliptin 100 MG tablet    JANUVIA    90 tablet    Take 1 tablet (100 mg) by mouth daily    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    Type 2 diabetes, HbA1C goal < 8% (H)       VITAMIN D3 PO      Take 1,000 Units by mouth daily        * Notice:  This list has 2 medication(s) that are the same as other  medications prescribed for you. Read the directions carefully, and ask your doctor or other care provider to review them with you.

## 2018-01-12 NOTE — PATIENT INSTRUCTIONS
Please call [facility] [phone] ([address] [facility], [address], [place], [place]) to schedule your appointment if you have not heard from them in two business days    Please follow up in clinic 2-3 business days following the MRI. Please call [phone] to schedule your follow up appointment.

## 2018-01-12 NOTE — PROGRESS NOTES
HISTORY OF PRESENT ILLNESS:    Crispin Rojas is a 57 year old male who is seen in consultation at the request of Dr. Lopez for left shoulder pain    Present symptoms: Pt states shoulder pain has been present for 3 years, denies any known injury to the shoulder.  Pt states reaching overhead will cause pain deep in joint, feels like a stabbing pain, can start to hurt at rest, keyboarding can cause pain in the shoulder.  When he plays golf, he has had intermittent pain but not consistently. Pain has been the inside the shoulder not in the deltoid region. He is right-hand dominant. His diabetes has been under good control over the last three years although most recent A1c level was noted to have gone up slightly.  Treatments tried to this point: physical therapy  Orthopedic PMH: foot surgeries Of partial amputations from infection    Past Medical History:   Diagnosis Date     Cerebral infarction (H)      Chronic infection      H/O heartburn      History of heartburn      Hypertension      Numbness and tingling      Obesity      Renal stone      Type II or unspecified type diabetes mellitus without mention of complication, not stated as uncontrolled        Past Surgical History:   Procedure Laterality Date     AMPUTATE FOOT Left 8/18/2016    Procedure: AMPUTATE FOOT;  Surgeon: Jack Younger DPM;  Location: [facility] OR     AMPUTATE TOE(S) Right 2/1/2016    Procedure: AMPUTATE TOE(S);  Surgeon: Rachelle Manriquez DPM, Pod;  Location: [facility] OR     ANGIOGRAM       COLONOSCOPY       COMBINED CYSTOSCOPY, RETROGRADES, URETEROSCOPY, INSERT STENT Left 8/21/2016    Procedure: COMBINED CYSTOSCOPY, RETROGRADES, URETEROSCOPY, INSERT STENT;  Surgeon: Artemio Valenzuela MD;  Location: [facility] OR     COMBINED CYSTOSCOPY, RETROGRADES, URETEROSCOPY, LASER HOLMIUM LITHOTRIPSY URETER(S), INSERT STENT Left 10/3/2016    Procedure: COMBINED CYSTOSCOPY, RETROGRADES, URETEROSCOPY, LASER HOLMIUM LITHOTRIPSY URETER(S), INSERT STENT;  Surgeon:  Artemio Valenzuela MD;  Location: [facility] OR     EXTRACORPOREAL SHOCK WAVE LITHOTRIPSY (ESWL) Left 9/8/2016    Procedure: EXTRACORPOREAL SHOCK WAVE LITHOTRIPSY (ESWL);  Surgeon: Artemio Valenzuela MD;  Location: [facility] OR     RECESSION GASTROCNEMIUS Right 2/1/2016    Procedure: RECESSION GASTROCNEMIUS;  Surgeon: Rachelle Manriquez, DPM, Pod;  Location: [facility] OR       Family History   Problem Relation Age of Onset     Arthritis Mother      SLE     Connective Tissue Disorder Mother      lupus     DIABETES Mother      CEREBROVASCULAR DISEASE Mother      CANCER Mother      DIABETES Father      DIABETES Maternal Grandfather      DIABETES Sister        Social History     Social History     Marital status:      Spouse name: Sydney     Number of children: 2     Years of education: N/A     Occupational History     marketing [employer]     Social History Main Topics     Smoking status: Never Smoker     Smokeless tobacco: Never Used     Alcohol use 0.0 oz/week     0 Standard drinks or equivalent per week      Comment: rare---red wine 2x per week     Drug use: No     Sexual activity: Yes     Partners: Female     Other Topics Concern     Parent/Sibling W/ Cabg, Mi Or Angioplasty Before 65f 55m? No     Social History Narrative       Current Outpatient Prescriptions   Medication Sig Dispense Refill     insulin glargine (LANTUS SOLOSTAR) 100 UNIT/ML injection Take up to 50 units BID 60 mL 1     atorvastatin (LIPITOR) 40 MG tablet TAKE 1 TABLET BY MOUTH EVERY DAY 90 tablet 1     amLODIPine (NORVASC) 5 MG tablet TAKE 1 TABLET(5 MG) BY MOUTH DAILY 90 tablet 0     NOVOLOG FLEXPEN 100 UNIT/ML soln INJECT 10 TO 35 UNITS UNDER THE SKIN THREE TIMES DAILY 30 mL 0     sitagliptin (JANUVIA) 100 MG tablet Take 1 tablet (100 mg) by mouth daily 90 tablet 3     lisinopril (PRINIVIL/ZESTRIL) 20 MG tablet Take 1 tablet (20 mg) by mouth daily 90 tablet 3     gabapentin (NEURONTIN) 100 MG capsule Take 300 mg by mouth At Bedtime        "Co-Enzyme Q10 100 MG CAPS Take 100 mg by mouth daily        Cholecalciferol (VITAMIN D3 PO) Take 1,000 Units by mouth daily        Multiple Vitamins-Minerals (MULTIVITAMIN PO) Take 1 tablet by mouth daily        Omega-3 Fatty Acids (OMEGA-3 FISH OIL PO) Take 2 g by mouth daily        metFORMIN (GLUCOPHAGE-XR) 500 MG 24 hr tablet TAKE 2 TABLETS BY MOUTH TWICE DAILY WITH MEALS (Patient not taking: Reported on 1/12/2018) 120 tablet 5     ketoconazole (NIZORAL) 2 % shampoo APPLY TO THE AFFECTED AREA AND WASH OFF AFTER 5 MINUTES. (Patient not taking: Reported on 1/12/2018) 120 mL 3     blood glucose (NO BRAND SPECIFIED) lancets standard Use to test blood sugar 3 times daily or as directed. 100 each 11     NOVOLOG FLEXPEN 100 UNIT/ML soln INJECT 10 TO 35 UNITS UNDER THE SKIN THREE TIMES DAILY 30 mL 0     order for DME CAM boot, Large 1 Device 0     CIALIS 5 MG tablet TAKE 1 TABLET BY MOUTH EVERY DAY AS NEEDED (Patient not taking: Reported on 1/12/2018) 30 tablet 0     blood glucose monitoring (NO BRAND SPECIFIED) meter device kit Use to test blood sugar 3 times daily or as directed. 1 kit 0     blood glucose monitoring (NO BRAND SPECIFIED) test strip Use to test blood sugars 3 times daily or as directed 300 strip 3     lancets thin MISC 1 each 3 times daily. 300 each 3     Insulin Syringe-Needle U-100 (BD INSULIN SYRINGE ULTRAFINE) 30G X 1/2\" 0.3 ML MISC 1 Syringe daily. Use one syringe  daily or as directed. 100 each prn       Allergies   Allergen Reactions     Niacin Swelling     SIMCOR caused edema in extremities     Simvastatin Swelling     SIMCOR caused edema in extremities       REVIEW OF SYSTEMS:  CONSTITUTIONAL:  NEGATIVE for fever, chills, change in weight  INTEGUMENTARY/SKIN:  NEGATIVE for worrisome rashes, moles or lesions  EYES:  NEGATIVE for vision changes or irritation  ENT/MOUTH:  NEGATIVE for ear, mouth and throat problems  RESP:  NEGATIVE for significant cough or SOB  BREAST:  NEGATIVE for masses, " "tenderness or discharge  CV:  NEGATIVE for chest pain, palpitations or peripheral edema  GI:  NEGATIVE for nausea, abdominal pain, heartburn, or change in bowel habits  :  Negative   MUSCULOSKELETAL:  See HPI above  NEURO:  NEGATIVE for weakness, dizziness or paresthesias  ENDOCRINE:  NEGATIVE for temperature intolerance, skin/hair changes  HEME/ALLERGY/IMMUNE:  NEGATIVE for bleeding problems  PSYCHIATRIC:  NEGATIVE for changes in mood or affect      PHYSICAL EXAM:  /74 (BP Location: Right arm, Patient Position: Sitting, Cuff Size: Adult Regular)  Ht 5' 10.6\" (1.793 m)  Wt 250 lb (113.4 kg)  BMI 35.26 kg/m2  Body mass index is 35.26 kg/(m^2).   GENERAL APPEARANCE: healthy, alert and no distress   HEENT: No apparent thyroid megaly. Clear sclera with normal ocular movement  RESPIRATORY: No labored breathing  SKIN: no suspicious lesions or rashes  NEURO: Normal strength and tone, mentation intact and speech normal  VASCULAR: Good pulses, and capillary refill   LYMPH: no lymphadenopathy   PSYCH:  mentation appears normal and affect normal/bright    MUSCULOSKELETAL:  Presence of scoliosis with a right-sided curvature and asymmetrical rib cage noted  Turning of the neck to the left causes pain along the trapezius  Limited range of motion but fairly symmetrical  Peñaloza elevations limited to 150 and external rotation is limited to 35  bilaterally  Positive arm drop test, left  Positive impingement sign, left  Slightly weak abduction and external rotation strength against resistance, left  Nontender acromioclavicular joint, left     ASSESSMENT:  Chronic impingement syndrome with a type III acromion noted on today's x-rays  Probable mild bilateral frozen shoulder  Possible rotator cuff tear    PLAN:  We visualized x-rays from today. Presence of type III acromion and acromioclavicular joint DJD were pointed out. We also pointed out the fact that he is quite weak in the left shoulder compared to the right.  Definitely, " there is a concern for rotator cuff tear in the left shoulder.  I recommended further evaluation with MRI scan for that reason.  He'll be seen to discuss future treatment options after MRI scan is completed.    Imaging Interpretation:   Three views of the left shoulder demonstrate presence of advanced acromioclavicular joint DJD with more or less complete obliteration of the joint space and presence of type III acromion. Mild sclerosis of the subchondral bone of the glenoid and humeral head is noted. No significant proximal migration of the humeral head is noted. No acute fractures or other osseous pathology noted.      Puneet Lozano MD  Department of Orthopedic Surgery        Disclaimer: This note consists of symbols derived from keyboarding, dictation and/or voice recognition software. As a result, there may be errors in the script that have gone undetected. Please consider this when interpreting information found in this chart.

## 2018-01-16 ENCOUNTER — TELEPHONE (OUTPATIENT)
Dept: FAMILY MEDICINE | Facility: CLINIC | Age: 58
End: 2018-01-16

## 2018-01-16 DIAGNOSIS — E11.42 TYPE 2 DIABETES MELLITUS WITH PERIPHERAL NEUROPATHY (H): Primary | ICD-10-CM

## 2018-01-16 RX ORDER — PEN NEEDLE, DIABETIC 30 GX3/16"
1 NEEDLE, DISPOSABLE MISCELLANEOUS
Qty: 100 EACH | Refills: 11 | Status: SHIPPED | OUTPATIENT
Start: 2018-01-16 | End: 2018-03-02

## 2018-01-16 NOTE — TELEPHONE ENCOUNTER
Lab Results   Component Value Date    A1C 9.6 12/27/2017    A1C 7.5 03/31/2017     Recent Labs   Lab Test  12/27/17   1001  03/31/17   1006   07/31/15   1649   CHOL  96  109   < >  141   HDL  26*  35*   < >  34*   LDL  37  53   < >  87   TRIG  167*  88   < >  101   CHOLHDLRATIO   --    --    --   4.1    < > = values in this interval not displayed.     BP Readings from Last 2 Encounters:   01/12/18 132/74   12/27/17 138/88     SENT for 5 per day dosing (bid with lantus, tid with novolog)  Prescription approved per [facility] Refill Protocol.  June Thomas, RN, BSN

## 2018-01-16 NOTE — TELEPHONE ENCOUNTER
Patient is requesting for us to call in a Rx for pen needles, he states he hasn't used these in over 2 years and is unsure the brand name. Patient's pharmacy in [facility].    Goldie Stinson

## 2018-01-22 ENCOUNTER — OFFICE VISIT (OUTPATIENT)
Dept: PODIATRY | Facility: CLINIC | Age: 58
End: 2018-01-22
Payer: COMMERCIAL

## 2018-01-22 VITALS — BODY MASS INDEX: 35 KG/M2 | HEART RATE: 68 BPM | HEIGHT: 71 IN | WEIGHT: 250 LBS

## 2018-01-22 DIAGNOSIS — E11.621 DIABETIC ULCER OF RIGHT MIDFOOT ASSOCIATED WITH TYPE 2 DIABETES MELLITUS, WITH FAT LAYER EXPOSED (H): ICD-10-CM

## 2018-01-22 DIAGNOSIS — E11.42 TYPE 2 DIABETES MELLITUS WITH PERIPHERAL NEUROPATHY (H): Primary | ICD-10-CM

## 2018-01-22 DIAGNOSIS — L97.412 DIABETIC ULCER OF RIGHT MIDFOOT ASSOCIATED WITH TYPE 2 DIABETES MELLITUS, WITH FAT LAYER EXPOSED (H): ICD-10-CM

## 2018-01-22 PROCEDURE — 11042 DBRDMT SUBQ TIS 1ST 20SQCM/<: CPT | Mod: RT | Performed by: PODIATRIST

## 2018-01-22 PROCEDURE — 99213 OFFICE O/P EST LOW 20 MIN: CPT | Mod: 25 | Performed by: PODIATRIST

## 2018-01-22 ASSESSMENT — PAIN SCALES - GENERAL: PAINLEVEL: MILD PAIN (2)

## 2018-01-22 NOTE — NURSING NOTE
"Chief Complaint   Patient presents with     RECHECK     right foot / DM2       Initial Pulse 68  Ht 5' 10.6\" (1.793 m)  Wt 250 lb (113.4 kg)  BMI 35.26 kg/m2 Estimated body mass index is 35.26 kg/(m^2) as calculated from the following:    Height as of this encounter: 5' 10.6\" (1.793 m).    Weight as of this encounter: 250 lb (113.4 kg).  Medication Reconciliation: complete      "

## 2018-01-22 NOTE — LETTER
"    1/22/2018         RE: Crispin Rojas  [address] [facility] [address]        Dear Colleague,    Thank you for referring your patient, Crispin Rojas, to the [facility]. Please see a copy of my visit note below.    ASSESSMENT/PLAN:  Encounter Diagnoses   Name Primary?     Type 2 diabetes mellitus with peripheral neuropathy (H) Yes     Diabetic ulcer of right midfoot associated with type 2 diabetes mellitus, with fat layer exposed (H)      Ulcers as noted below.  No clinical signs of infection.  Interval healing.   The new ulcerations are superficial.      As always, we reviewed wound care instructions, purpose of and need for offloading,  risk of infection and hospitalization.     He is to monitor for redness, drainage, swelling, pain and seek immediate medical attention if his foot is worsening.     Follow up in 3-4 weeks.    Excisional debridment procedure discussed.  Goals of removing non-viable tissue, evaluating full extent of wound, and promoting wound healing were explained.  Pt provided verbal and written consent.  A \"Time Out\" was called.     Using a sterile #15 blade and tissue nippers, excisional debridment of the right foot ulcers (sub 1st and 5th metatarsal heads) was performed, full-thickness to the level of, and including, the subcutaneous tissue.  The hyperkeratotic eschar was removed, by excising skin edges back to healthy, bleeding tissue .  Other non-viable tissue was excised. The ulcer base was scraped to remove bioburden and promote healing.  There was moderate bleeding with the procedure. No anesthesia needed due to peripheral neuropathy.  Sterile dressing applied.    Body mass index is 35.26 kg/(m^2).  Weight management plan: Patient was referred to their PCP to discuss a diet and exercise plan.    Valentino Molina DPM, FACFAS, MS    [place] [facility] Pod[facility]ry/Foot & Ankle " "Surgery      ____________________________________________________________________    HPI:       Chief Complaint: follow up for ulceration, right foot;  Type 2 diabetes with peripheral neuropathy  Onset of problem: 4 months  History well documented in previous visits.  No new concerns.  He uses a CAM walker     He said that his ulcer was healing and he was doing well, until last Thursday.  On Thursday he was \"working out in the yard\" and could not wear the CAM walker. He said that he developed blisters on top of the foot.     *  Past Medical History:   Diagnosis Date     Cerebral infarction (H)      Chronic infection      H/O heartburn      History of heartburn      Hypertension      Numbness and tingling      Obesity      Renal stone      Type II or unspecified type diabetes mellitus without mention of complication, not stated as uncontrolled    *  *  Past Surgical History:   Procedure Laterality Date     AMPUTATE FOOT Left 8/18/2016    Procedure: AMPUTATE FOOT;  Surgeon: Jack Younger DPM;  Location: [facility] OR     AMPUTATE TOE(S) Right 2/1/2016    Procedure: AMPUTATE TOE(S);  Surgeon: Rachelle Manriquez DPM, Pod;  Location: [facility] OR     ANGIOGRAM       COLONOSCOPY       COMBINED CYSTOSCOPY, RETROGRADES, URETEROSCOPY, INSERT STENT Left 8/21/2016    Procedure: COMBINED CYSTOSCOPY, RETROGRADES, URETEROSCOPY, INSERT STENT;  Surgeon: Artemio Valenzuela MD;  Location: [facility] OR     COMBINED CYSTOSCOPY, RETROGRADES, URETEROSCOPY, LASER HOLMIUM LITHOTRIPSY URETER(S), INSERT STENT Left 10/3/2016    Procedure: COMBINED CYSTOSCOPY, RETROGRADES, URETEROSCOPY, LASER HOLMIUM LITHOTRIPSY URETER(S), INSERT STENT;  Surgeon: Artemio Valenzuela MD;  Location: [facility] OR     EXTRACORPOREAL SHOCK WAVE LITHOTRIPSY (ESWL) Left 9/8/2016    Procedure: EXTRACORPOREAL SHOCK WAVE LITHOTRIPSY (ESWL);  Surgeon: Artemio Valenzuela MD;  Location: [facility] OR     RECESSION GASTROCNEMIUS Right 2/1/2016    Procedure: RECESSION GASTROCNEMIUS;  Surgeon: " Rachelle Manriquez, DPM, Pod;  Location:  OR   *  *  Current Outpatient Prescriptions   Medication Sig Dispense Refill     Insulin Pen Needle (PEN NEEDLES) 31G X 5 MM MISC 1 Device 5 times daily , TWICE DAILY WITH LANTUS AND 3 TIMES A DAY PRN WITH NOVOLOG FLEXPEN 100 each 11     metFORMIN (GLUCOPHAGE-XR) 500 MG 24 hr tablet TAKE 2 TABLETS BY MOUTH TWICE DAILY WITH MEALS (Patient not taking: Reported on 1/12/2018) 120 tablet 5     insulin glargine (LANTUS SOLOSTAR) 100 UNIT/ML injection Take up to 50 units BID 60 mL 1     ketoconazole (NIZORAL) 2 % shampoo APPLY TO THE AFFECTED AREA AND WASH OFF AFTER 5 MINUTES. (Patient not taking: Reported on 1/12/2018) 120 mL 3     blood glucose (NO BRAND SPECIFIED) lancets standard Use to test blood sugar 3 times daily or as directed. 100 each 11     NOVOLOG FLEXPEN 100 UNIT/ML soln INJECT 10 TO 35 UNITS UNDER THE SKIN THREE TIMES DAILY 30 mL 0     atorvastatin (LIPITOR) 40 MG tablet TAKE 1 TABLET BY MOUTH EVERY DAY 90 tablet 1     amLODIPine (NORVASC) 5 MG tablet TAKE 1 TABLET(5 MG) BY MOUTH DAILY 90 tablet 0     NOVOLOG FLEXPEN 100 UNIT/ML soln INJECT 10 TO 35 UNITS UNDER THE SKIN THREE TIMES DAILY 30 mL 0     order for DME CAM boot, Large 1 Device 0     sitagliptin (JANUVIA) 100 MG tablet Take 1 tablet (100 mg) by mouth daily 90 tablet 3     lisinopril (PRINIVIL/ZESTRIL) 20 MG tablet Take 1 tablet (20 mg) by mouth daily 90 tablet 3     gabapentin (NEURONTIN) 100 MG capsule Take 300 mg by mouth At Bedtime       CIALIS 5 MG tablet TAKE 1 TABLET BY MOUTH EVERY DAY AS NEEDED (Patient not taking: Reported on 1/12/2018) 30 tablet 0     blood glucose monitoring (NO BRAND SPECIFIED) meter device kit Use to test blood sugar 3 times daily or as directed. 1 kit 0     blood glucose monitoring (NO BRAND SPECIFIED) test strip Use to test blood sugars 3 times daily or as directed 300 strip 3     Co-Enzyme Q10 100 MG CAPS Take 100 mg by mouth daily        Cholecalciferol (VITAMIN D3 PO) Take  "1,000 Units by mouth daily        Multiple Vitamins-Minerals (MULTIVITAMIN PO) Take 1 tablet by mouth daily        Omega-3 Fatty Acids (OMEGA-3 FISH OIL PO) Take 2 g by mouth daily        lancets thin MISC 1 each 3 times daily. 300 each 3     Insulin Syringe-Needle U-100 (BD INSULIN SYRINGE ULTRAFINE) 30G X 1/2\" 0.3 ML MISC 1 Syringe daily. Use one syringe  daily or as directed. 100 each prn       EXAM:      Constitutional/ general:  Pt is in no apparent distress, appears well-nourished.  Cooperative with history and physical exam.       Vascular:  Pedal pulses are palpable bilaterally for both the DP and PT arteries.  CFT < 3 sec.  No edema.  Pedal hair growth noted.       Neuro:  Alert and oriented x 3. Coordinated gait.  Light touch sensation is diminished to the L4, L5, S1 distributions.  No evidence of neurological-based weakness, spasticity, or contracture in the lower extremities.       Derm:    1)  Full thickness ulceration to the level of the subcutaneous fat, sub right 1st metatarsal head.  Base is granular.  Prolific hyperkeratotic eschar around the edges.  No purulence. The wound does not probe to bone.  1.0cm  X 0.8cm oval, x 0.2cm deep.     2) Ulceration, plantar lateral right 5th metatarsal head:  0.5cm diameter x 0.1cm deep, toe the level of deeper skin.     Neither wound shows any surrounding erythema, malodor, purulence.      3) superficial ulcerations, secondary to blistering, dorsal right foot, dorsal right hallux, dorsal right 4th toe.        Musculoskeletal:    Lower extremity muscle strength is normal.  Patient is ambulatory without an assistive device or brace .  No gross deformities.  There is b/l limited ankle dorsiflexion with knee extended.   ROM is increased with flexion of the knee b/l.  Digital contractures.       Valentino Molina, SHRUTHI, FACFAS, MS    [place] [facility] of Podiatry/Foot & Ankle Surgery        Again, thank you for allowing me to participate in the care of your patient.  "       Sincerely,        Valentino Molina, PRAFULM

## 2018-01-22 NOTE — MR AVS SNAPSHOT
After Visit Summary   1/22/2018    Crispin Rojas    MRN: [id number]           Patient Information     Date Of Birth          1960        Visit Information        Provider Department      1/22/2018 8:00 AM Valentino Molina DPM [facility]        Today's Diagnoses     Type 2 diabetes mellitus with peripheral neuropathy (H)    -  1    Diabetic ulcer of right midfoot associated with type 2 diabetes mellitus, with fat layer exposed (H)           Follow-ups after your visit        Your next 10 appointments already scheduled     Jan 26, 2018  9:30 AM CST   Diabetic Education with June Walker   [facility] ([facility])    [address]  [facility] [id number] [phone]            Jan 29, 2018  7:00 AM CST   (Arrive by 6:45 AM)   MR SHOULDER LEFT W/O CONTRAST with [facility]   [facility] ([facility])    [address] [facility] [id number] [phone]           Take your medicines as usual, unless your doctor tells you not to. Bring a list of your current medicines to your exam (including vitamins, minerals and over-the-counter drugs). Also bring the results of similar scans you may have had.  Please remove any body piercings and hair extensions before you arrive.  Follow your doctor s orders. If you do not, we may have to postpone your exam.  You will not have contrast for this exam. You do not need to do anything special to prepare.  The MRI machine uses a strong magnet. Please wear clothes without metal (snaps, zippers). A sweatsuit works well, or we may give you a hospital gown.   **IMPORTANT** THE INSTRUCTIONS BELOW ARE ONLY FOR THOSE PATIENTS WHO HAVE BEEN TOLD THEY WILL RECEIVE SEDATION OR GENERAL ANESTHESIA DURING THEIR MRI PROCEDURE:  IF YOU WILL RECEIVE SEDATION (take medicine to help you relax during your exam):   You must get the medicine from your doctor before you  arrive. Bring the medicine to the exam. Do not take it at home.   Arrive one hour early. Bring someone who can take you home after the test. Your medicine will make you sleepy. After the exam, you may not drive, take a bus or take a taxi by yourself.   No eating 8 hours before your exam. You may have clear liquids up until 4 hours before your exam. (Clear liquids include water, clear tea, black coffee and fruit juice without pulp.)  IF YOU WILL RECEIVE ANESTHESIA (be asleep for your exam):   Arrive 1 1/2 hours early. Bring someone who can take you home after the test. You may not drive, take a bus or take a taxi by yourself.   No eating 8 hours before your exam. You may have clear liquids up until 4 hours before your exam. (Clear liquids include water, clear tea, black coffee and fruit juice without pulp.)   You will spend four to five hours in the recovery room.  Please call the Imaging Department at your exam site with any questions.            Mar 20, 2018  8:45 AM CDT   SHORT with Aden Lopez MD   [facility] ([facility])    [address] [place] [id number] [phone]              Who to contact     If you have questions or need follow up information about today's clinic visit or your schedule please contact [facility] directly at [phone].  Normal or non-critical lab and imaging results will be communicated to you by MyChart, letter or phone within 4 business days after the clinic has received the results. If you do not hear from us within 7 days, please contact the clinic through MyChart or phone. If you have a critical or abnormal lab result, we will notify you by phone as soon as possible.  Submit refill requests through [employer] or call your pharmacy and they will forward the refill request to us. Please allow 3 business days for your refill to be completed.          Additional Information About Your Visit    "     MyChart Information     [employer] gives you secure access to your electronic health record. If you see a primary care provider, you can also send messages to your care team and make appointments. If you have questions, please call your primary care clinic.  If you do not have a primary care provider, please call [phone] and they will assist you.        Care EveryWhere ID     This is your Care EveryWhere ID. This could be used by other organizations to access your [facility] medical records  [id number]        Your Vitals Were     Pulse Height BMI (Body Mass Index)             68 5' 10.6\" (1.793 m) 35.26 kg/m2          Blood Pressure from Last 3 Encounters:   01/12/18 132/74   12/27/17 138/88   12/18/17 156/84    Weight from Last 3 Encounters:   01/22/18 250 lb (113.4 kg)   01/12/18 250 lb (113.4 kg)   12/27/17 249 lb (112.9 kg)              We Performed the Following     DEBRIDE SKIN/SUBQ TISSUE        Primary Care Provider Office Phone # Fax #    Aden Lopez [phone] [address] [id number]        Equal Access to Services     ELI MARTÍNEZ AH: Hadii aad ku hadasho Soivonneali, waaxda luqadaha, qaybta kaalmada adeegyada, blanca rhodes. So [facility] [phone].    ATENCIÓN: Si habla español, tiene a gomez disposición servicios gratuitos de asistencia lingüística. Llame al [phone].    We comply with applicable federal civil rights laws and [place] laws. We do not discriminate on the basis of race, color, national origin, age, disability, sex, sexual orientation, or gender identity.            Thank you!     Thank you for choosing [facility]  for your care. Our goal is always to provide you with excellent care. Hearing back from our patients is one way we can continue to improve our services. Please take a few minutes to complete the written survey that you may receive in the mail after your visit with us. Thank " "you!             Your Updated Medication List - Protect others around you: Learn how to safely use, store and throw away your medicines at www.disposemymeds.org.          This list is accurate as of: 1/22/18  8:56 AM.  Always use your most recent med list.                   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 Use one syringe  daily or as directed.    Type 2 diabetes, HbA1C goal < 8% (H)       blood glucose lancets standard    no brand specified    100 each    Use to test blood sugar 3 times daily or as directed.    Type 2 diabetes mellitus with diabetic neuropathy, with long-term current use of insulin (H)       blood glucose monitoring meter device kit    no brand specified    1 kit    Use to test blood sugar 3 times daily or as directed.    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)       CIALIS 5 MG tablet   Generic drug:  tadalafil     30 tablet    TAKE 1 TABLET BY MOUTH EVERY DAY AS NEEDED    Erectile dysfunction due to diseases classified elsewhere       Co-Enzyme Q10 100 MG Caps      Take 100 mg by mouth daily        gabapentin 100 MG capsule    NEURONTIN     Take 300 mg by mouth At Bedtime        insulin glargine 100 UNIT/ML injection    LANTUS SOLOSTAR    60 mL    Take up to 50 units BID    Diabetes mellitus type 2 with neurological " manifestations (H)       ketoconazole 2 % shampoo    NIZORAL    120 mL    APPLY TO THE AFFECTED AREA AND WASH OFF AFTER 5 MINUTES.    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)       MULTIVITAMIN PO      Take 1 tablet by mouth daily        * NovoLOG FLEXPEN 100 UNIT/ML injection   Generic drug:  insulin aspart     30 mL    INJECT 10 TO 35 UNITS UNDER THE SKIN THREE TIMES DAILY    Type 2 diabetes mellitus with diabetic peripheral angiopathy and gangrene, with long-term current use of insulin (H)       * NovoLOG FLEXPEN 100 UNIT/ML injection   Generic drug:  insulin aspart     30 mL    INJECT 10 TO 35 UNITS UNDER THE SKIN THREE TIMES DAILY    Type 2 diabetes mellitus with diabetic peripheral angiopathy and gangrene, with long-term current use of insulin (H)       OMEGA-3 FISH OIL PO      Take 2 g by mouth daily        order for DME     1 Device    CAM boot, Large    Type 2 diabetes mellitus with peripheral neuropathy (H), Skin ulcer of right foot with fat layer exposed (H)       Pen Needles 31G X 5 MM Misc     100 each    1 Device 5 times daily , TWICE DAILY WITH LANTUS AND 3 TIMES A DAY PRN WITH NOVOLOG FLEXPEN    Type 2 diabetes mellitus with peripheral neuropathy (H)       sitagliptin 100 MG tablet    JANUVIA    90 tablet    Take 1 tablet (100 mg) by mouth daily    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    Type 2 diabetes, HbA1C goal < 8% (H)       VITAMIN D3 PO      Take 1,000 Units by mouth daily        * Notice:  This list has 2 medication(s) that are the same as other medications prescribed for you. Read the directions  carefully, and ask your doctor or other care provider to review them with you.

## 2018-01-22 NOTE — PROGRESS NOTES
"ASSESSMENT/PLAN:  Encounter Diagnoses   Name Primary?     Type 2 diabetes mellitus with peripheral neuropathy (H) Yes     Diabetic ulcer of right midfoot associated with type 2 diabetes mellitus, with fat layer exposed (H)      Ulcers as noted below.  No clinical signs of infection.  Interval healing.   The new ulcerations are superficial.      As always, we reviewed wound care instructions, purpose of and need for offloading,  risk of infection and hospitalization.     He is to monitor for redness, drainage, swelling, pain and seek immediate medical attention if his foot is worsening.     Follow up in 3-4 weeks.    Excisional debridment procedure discussed.  Goals of removing non-viable tissue, evaluating full extent of wound, and promoting wound healing were explained.  Pt provided verbal and written consent.  A \"Time Out\" was called.     Using a sterile #15 blade and tissue nippers, excisional debridment of the right foot ulcers (sub 1st and 5th metatarsal heads) was performed, full-thickness to the level of, and including, the subcutaneous tissue.  The hyperkeratotic eschar was removed, by excising skin edges back to healthy, bleeding tissue .  Other non-viable tissue was excised. The ulcer base was scraped to remove bioburden and promote healing.  There was moderate bleeding with the procedure. No anesthesia needed due to peripheral neuropathy.  Sterile dressing applied.    Body mass index is 35.26 kg/(m^2).  Weight management plan: Patient was referred to their PCP to discuss a diet and exercise plan.    Valentino Molina DPM, FACFAS, MS    [facility]      ____________________________________________________________________    HPI:       Chief Complaint: follow up for ulceration, right foot;  Type 2 diabetes with peripheral neuropathy  Onset of problem: 4 months  History well documented in previous visits.  No new concerns.  He uses a CAM walker     He said that his ulcer " "was healing and he was doing well, until last Thursday.  On Thursday he was \"working out in the yard\" and could not wear the CAM walker. He said that he developed blisters on top of the foot.     *  Past Medical History:   Diagnosis Date     Cerebral infarction (H)      Chronic infection      H/O heartburn      History of heartburn      Hypertension      Numbness and tingling      Obesity      Renal stone      Type II or unspecified type diabetes mellitus without mention of complication, not stated as uncontrolled    *  *  Past Surgical History:   Procedure Laterality Date     AMPUTATE FOOT Left 8/18/2016    Procedure: AMPUTATE FOOT;  Surgeon: Jack Younger DPM;  Location: [facility] OR     AMPUTATE TOE(S) Right 2/1/2016    Procedure: AMPUTATE TOE(S);  Surgeon: Rachelle Manriquez DPM, Pod;  Location: [facility] OR     ANGIOGRAM       COLONOSCOPY       COMBINED CYSTOSCOPY, RETROGRADES, URETEROSCOPY, INSERT STENT Left 8/21/2016    Procedure: COMBINED CYSTOSCOPY, RETROGRADES, URETEROSCOPY, INSERT STENT;  Surgeon: Artemio Valenzuela MD;  Location: [facility] OR     COMBINED CYSTOSCOPY, RETROGRADES, URETEROSCOPY, LASER HOLMIUM LITHOTRIPSY URETER(S), INSERT STENT Left 10/3/2016    Procedure: COMBINED CYSTOSCOPY, RETROGRADES, URETEROSCOPY, LASER HOLMIUM LITHOTRIPSY URETER(S), INSERT STENT;  Surgeon: Artemio Valenzuela MD;  Location: [facility] OR     EXTRACORPOREAL SHOCK WAVE LITHOTRIPSY (ESWL) Left 9/8/2016    Procedure: EXTRACORPOREAL SHOCK WAVE LITHOTRIPSY (ESWL);  Surgeon: Artemio Valenzuela MD;  Location: [facility] OR     RECESSION GASTROCNEMIUS Right 2/1/2016    Procedure: RECESSION GASTROCNEMIUS;  Surgeon: Rachelle Manriquez DPM, Pod;  Location: [facility] OR   *  *  Current Outpatient Prescriptions   Medication Sig Dispense Refill     Insulin Pen Needle (PEN NEEDLES) 31G X 5 MM MISC 1 Device 5 times daily , TWICE DAILY WITH LANTUS AND 3 TIMES A DAY PRN WITH NOVOLOG FLEXPEN 100 each 11     metFORMIN (GLUCOPHAGE-XR) 500 MG 24 hr tablet TAKE 2 " "TABLETS BY MOUTH TWICE DAILY WITH MEALS (Patient not taking: Reported on 1/12/2018) 120 tablet 5     insulin glargine (LANTUS SOLOSTAR) 100 UNIT/ML injection Take up to 50 units BID 60 mL 1     ketoconazole (NIZORAL) 2 % shampoo APPLY TO THE AFFECTED AREA AND WASH OFF AFTER 5 MINUTES. (Patient not taking: Reported on 1/12/2018) 120 mL 3     blood glucose (NO BRAND SPECIFIED) lancets standard Use to test blood sugar 3 times daily or as directed. 100 each 11     NOVOLOG FLEXPEN 100 UNIT/ML soln INJECT 10 TO 35 UNITS UNDER THE SKIN THREE TIMES DAILY 30 mL 0     atorvastatin (LIPITOR) 40 MG tablet TAKE 1 TABLET BY MOUTH EVERY DAY 90 tablet 1     amLODIPine (NORVASC) 5 MG tablet TAKE 1 TABLET(5 MG) BY MOUTH DAILY 90 tablet 0     NOVOLOG FLEXPEN 100 UNIT/ML soln INJECT 10 TO 35 UNITS UNDER THE SKIN THREE TIMES DAILY 30 mL 0     order for DME CAM boot, Large 1 Device 0     sitagliptin (JANUVIA) 100 MG tablet Take 1 tablet (100 mg) by mouth daily 90 tablet 3     lisinopril (PRINIVIL/ZESTRIL) 20 MG tablet Take 1 tablet (20 mg) by mouth daily 90 tablet 3     gabapentin (NEURONTIN) 100 MG capsule Take 300 mg by mouth At Bedtime       CIALIS 5 MG tablet TAKE 1 TABLET BY MOUTH EVERY DAY AS NEEDED (Patient not taking: Reported on 1/12/2018) 30 tablet 0     blood glucose monitoring (NO BRAND SPECIFIED) meter device kit Use to test blood sugar 3 times daily or as directed. 1 kit 0     blood glucose monitoring (NO BRAND SPECIFIED) test strip Use to test blood sugars 3 times daily or as directed 300 strip 3     Co-Enzyme Q10 100 MG CAPS Take 100 mg by mouth daily        Cholecalciferol (VITAMIN D3 PO) Take 1,000 Units by mouth daily        Multiple Vitamins-Minerals (MULTIVITAMIN PO) Take 1 tablet by mouth daily        Omega-3 Fatty Acids (OMEGA-3 FISH OIL PO) Take 2 g by mouth daily        lancets thin MISC 1 each 3 times daily. 300 each 3     Insulin Syringe-Needle U-100 (BD INSULIN SYRINGE ULTRAFINE) 30G X 1/2\" 0.3 ML MISC 1 " Syringe daily. Use one syringe  daily or as directed. 100 each prn       EXAM:      Constitutional/ general:  Pt is in no apparent distress, appears well-nourished.  Cooperative with history and physical exam.       Vascular:  Pedal pulses are palpable bilaterally for both the DP and PT arteries.  CFT < 3 sec.  No edema.  Pedal hair growth noted.       Neuro:  Alert and oriented x 3. Coordinated gait.  Light touch sensation is diminished to the L4, L5, S1 distributions.  No evidence of neurological-based weakness, spasticity, or contracture in the lower extremities.       Derm:    1)  Full thickness ulceration to the level of the subcutaneous fat, sub right 1st metatarsal head.  Base is granular.  Prolific hyperkeratotic eschar around the edges.  No purulence. The wound does not probe to bone.  1.0cm  X 0.8cm oval, x 0.2cm deep.     2) Ulceration, plantar lateral right 5th metatarsal head:  0.5cm diameter x 0.1cm deep, toe the level of deeper skin.     Neither wound shows any surrounding erythema, malodor, purulence.      3) superficial ulcerations, secondary to blistering, dorsal right foot, dorsal right hallux, dorsal right 4th toe.        Musculoskeletal:    Lower extremity muscle strength is normal.  Patient is ambulatory without an assistive device or brace .  No gross deformities.  There is b/l limited ankle dorsiflexion with knee extended.   ROM is increased with flexion of the knee b/l.  Digital contractures.       Valentino Molina DPM, FACGAEL, MS [facility] & [facility]le [facility]

## 2018-01-26 ENCOUNTER — TELEPHONE (OUTPATIENT)
Dept: FAMILY MEDICINE | Facility: CLINIC | Age: 58
End: 2018-01-26

## 2018-01-26 ENCOUNTER — TELEPHONE (OUTPATIENT)
Dept: EDUCATION SERVICES | Facility: CLINIC | Age: 58
End: 2018-01-26

## 2018-01-26 ENCOUNTER — ALLIED HEALTH/NURSE VISIT (OUTPATIENT)
Dept: EDUCATION SERVICES | Facility: CLINIC | Age: 58
End: 2018-01-26
Payer: COMMERCIAL

## 2018-01-26 DIAGNOSIS — E11.52 TYPE 2 DIABETES MELLITUS WITH DIABETIC PERIPHERAL ANGIOPATHY AND GANGRENE, WITH LONG-TERM CURRENT USE OF INSULIN (H): Primary | ICD-10-CM

## 2018-01-26 DIAGNOSIS — E11.42 TYPE 2 DIABETES MELLITUS WITH PERIPHERAL NEUROPATHY (H): Primary | ICD-10-CM

## 2018-01-26 DIAGNOSIS — Z79.4 TYPE 2 DIABETES MELLITUS WITH DIABETIC PERIPHERAL ANGIOPATHY AND GANGRENE, WITH LONG-TERM CURRENT USE OF INSULIN (H): Primary | ICD-10-CM

## 2018-01-26 PROCEDURE — G0108 DIAB MANAGE TRN  PER INDIV: HCPCS | Performed by: DIETITIAN, REGISTERED

## 2018-01-26 NOTE — TELEPHONE ENCOUNTER
See DM Ed encounter note from today for details.     Pending Rx for personal CGM (pharmacy will use an e-voucher to reduce cost)   and Tresiba insulin for MD signature if agreeable to plan.     June Walker RD, CDE  Diabetes

## 2018-01-26 NOTE — MR AVS SNAPSHOT
After Visit Summary   1/26/2018    Crispin Rojas    MRN: [id number]           Patient Information     Date Of Birth          1960        Visit Information        Provider Department      1/26/2018 9:30 AM June Walker [facility]        Care Instructions    My Diabetes Care Goals:    Monitoring:  Will order the [employer] personal CGM system    Taking Medication:  Will ask Dr. Lopez for insulin orders to change from Lantus to Tresiba 55 units once daily.     Try to correct using 2 units of Novolog per 50 above 150 mg/dl. May need further adjustment of the Novolog once more information is available.    Please request an appointment with Jasmin Arteaga NP.     June Walker RD, LD, CDE  Diabetes      Bring blood glucose meter and logbook with you to all doctor and follow-up appointments.     [facility] for the [facility]:  For Your Diabetes Education and Nutrition Appointments Call:  [phone]   For Diabetes Education or Nutrition Related Questions:   Phone: [phone]  E-mail: [email or web address]  Fax: [phone]   If you need a medication refill please contact your pharmacy. Please allow 3 business days for your refills to be completed.    Instructions for emailing the Diabetes Educators    If you need to communicate a non-urgent message to a Diabetes Educator via email, please send to [email or web address].    Please follow the following email guidelines:    Subject line: Secure: your clinic name (example: Secure: [facility])  In the email please include: First name, middle initial, last name and date of birth.    We will be in touch with you within one (1) business day.             Follow-ups after your visit        Your next 10 appointments already scheduled     Jan 29, 2018  7:00 AM CST   (Arrive by 6:45 AM)   MR SHOULDER LEFT W/O CONTRAST with [facility]   [facility] ([facility])    [address] [facility] [id number] [phone]           Take your medicines as usual, unless your doctor tells you not to. Bring a list of your current medicines to your exam (including vitamins, minerals and over-the-counter drugs). Also bring the results of similar scans you may have had.  Please remove any body piercings and hair extensions before you arrive.  Follow your doctor s orders. If you do not, we may have to postpone your exam.  You will not have contrast for this exam. You do not need to do anything special to prepare.  The MRI machine uses a strong magnet. Please wear clothes without metal (snaps, zippers). A sweatsuit works well, or we may give you a hospital gown.   **IMPORTANT** THE INSTRUCTIONS BELOW ARE ONLY FOR THOSE PATIENTS WHO HAVE BEEN TOLD THEY WILL RECEIVE SEDATION OR GENERAL ANESTHESIA DURING THEIR MRI PROCEDURE:  IF YOU WILL RECEIVE SEDATION (take medicine to help you relax during your exam):   You must get the medicine from your doctor before you arrive. Bring the medicine to the exam. Do not take it at home.   Arrive one hour early. Bring someone who can take you home after the test. Your medicine will make you sleepy. After the exam, you may not drive, take a bus or take a taxi by yourself.   No eating 8 hours before your exam. You may have clear liquids up until 4 hours before your exam. (Clear liquids include water, clear tea, black coffee and fruit juice without pulp.)  IF YOU WILL RECEIVE ANESTHESIA (be asleep for your exam):   Arrive 1 1/2 hours early. Bring someone who can take you home after the test. You may not drive, take a bus or take a taxi by yourself.   No eating 8 hours before your exam. You may have clear liquids up until 4 hours before your exam. (Clear liquids include water, clear tea, black coffee and fruit juice without pulp.)   You will spend four to five hours in the recovery room.  Please call the Imaging Department at your exam site  with any questions.            Feb 09, 2018  9:30 AM CST   Diabetic Education with June Walker   [facility] ([facility])    [id number] [place] [id number] [phone]            Feb 12, 2018  8:00 AM CST   [facility]    [address] [id number] [phone]            Mar 20, 2018  8:45 AM CDT   SHORT with Aden Lopez MD   [facility]    [address] [facility] [address] [phone]              Who to contact     If you have questions or need follow up information about today's clinic visit or your schedule please contact [facility] directly at [phone].  Normal or non-critical lab and imaging results will be communicated to you by Open[employer], letter or phone within 4 business days after the clinic has received the results. If you do not hear from us within 7 days, please contact the clinic through [facility] or phone. If you have a critical or abnormal lab result, we will notify you by phone as soon as possible.  Submit refill requests through [facility] ([facility]) or call your pharmacy and they will forward the refill request to us. Please allow 3 business days for your refill to be completed.          Additional Information About Your Visit        [facility] ([facility]) Information     [facility] ([facility]) gives you secure access to your electronic health record. If you see a primary care provider, you can also send messages to your care team and make appointments. If you have questions, please call your primary care clinic.  If you do not have a primary care provider, please call [phone] and they will assist you.        Care EveryWhere ID     This is your Care EveryWhere ID. This could be used by other organizations to access your [facility]  medical records  [id number]         Blood Pressure from Last 3 Encounters:   01/12/18 132/74   12/27/17 138/88   12/18/17 156/84    Weight from Last 3 Encounters:   01/22/18 113.4 kg (250 lb)   01/12/18 113.4 kg (250 lb)   12/27/17 112.9 kg (249 lb)              Today, you had the following     No orders found for display       Primary Care Provider Office Phone # Fax #    Aden Ethan Lopez [phone] [address] [facility] [address]        Equal Access to Services     [facility]: Hadii aad ku hadasho Soomaali, waaxda luqadaha, qaybta kaalmada adeegyada, blanca tellez haymistyn adejefry moran . So [facility] [phone].    ATENCIÓN: Si habla español, tiene a gomez disposición servicios gratuitos de asistencia lingüística. Llame al [phone].    We comply with applicable federal civil rights laws and [place] laws. We do not discriminate on the basis of race, color, national origin, age, disability, sex, sexual orientation, or gender identity.            Thank you!     Thank you for choosing [facility] [place]  for your care. Our goal is always to provide you with excellent care. Hearing back from our patients is one way we can continue to improve our services. Please take a few minutes to complete the written survey that you may receive in the mail after your visit with us. Thank you!             Your Updated Medication List - Protect others around you: Learn how to safely use, store and throw away your medicines at www.disposemymeds.org.          This list is accurate as of 1/26/18 10:39 AM.  Always use your most recent med list.                   Brand Name Dispense Instructions for use Diagnosis    amLODIPine 5 MG tablet    NORVASC    90 tablet    TAKE 1 TABLET(5 MG) BY MOUTH DAILY    Other specified transient cerebral ischemias       atorvastatin 40 MG tablet    LIPITOR    90 tablet    TAKE 1 TABLET BY MOUTH EVERY DAY    Diabetes mellitus type 2 with  "neurological manifestations (H), Hyperlipidemia LDL goal <100       BD insulin syringe ultrafine 30G X 1/2\" 0.3 ML   Generic drug:  insulin syringe-needle U-100     100 each    1 Syringe daily. Use one syringe  daily or as directed.    Type 2 diabetes, HbA1C goal < 8% (H)       blood glucose lancets standard    no brand specified    100 each    Use to test blood sugar 3 times daily or as directed.    Type 2 diabetes mellitus with diabetic neuropathy, with long-term current use of insulin (H)       blood glucose monitoring meter device kit    no brand specified    1 kit    Use to test blood sugar 3 times daily or as directed.    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)       MULTIVITAMIN PO      Take 1 tablet by mouth daily        * NovoLOG FLEXPEN 100 UNIT/ML injection   Generic drug:  " insulin aspart     30 mL    INJECT 10 TO 35 UNITS UNDER THE SKIN THREE TIMES DAILY    Type 2 diabetes mellitus with diabetic peripheral angiopathy and gangrene, with long-term current use of insulin (H)       * NovoLOG FLEXPEN 100 UNIT/ML injection   Generic drug:  insulin aspart     30 mL    INJECT 10 TO 35 UNITS UNDER THE SKIN THREE TIMES DAILY    Type 2 diabetes mellitus with diabetic peripheral angiopathy and gangrene, with long-term current use of insulin (H)       OMEGA-3 FISH OIL PO      Take 2 g by mouth daily        order for DME     1 Device    CAM boot, Large    Type 2 diabetes mellitus with peripheral neuropathy (H), Skin ulcer of right foot with fat layer exposed (H)       Pen Needles 31G X 5 MM Misc     100 each    1 Device 5 times daily , TWICE DAILY WITH LANTUS AND 3 TIMES A DAY PRN WITH NOVOLOG FLEXPEN    Type 2 diabetes mellitus with peripheral neuropathy (H)       sitagliptin 100 MG tablet    JANUVIA    90 tablet    Take 1 tablet (100 mg) by mouth daily    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    Type 2 diabetes, HbA1C goal < 8% (H)       VITAMIN D3 PO      Take 1,000 Units by mouth daily        * Notice:  This list has 2 medication(s) that are the same as other medications prescribed for you. Read the directions carefully, and ask your doctor or other care provider to review them with you.

## 2018-01-26 NOTE — TELEPHONE ENCOUNTER
Panel Management Review      Patient has the following on his problem list:     Diabetes    ASA: Failed     Last A1C  Lab Results   Component Value Date    A1C 9.6 12/27/2017    A1C 7.5 03/31/2017    A1C 7.3 12/05/2016    A1C 6.7 08/30/2016    A1C 6.4 08/17/2016     A1C tested: FAILED    Last LDL:    Lab Results   Component Value Date    CHOL 96 12/27/2017     Lab Results   Component Value Date    HDL 26 12/27/2017     Lab Results   Component Value Date    LDL 37 12/27/2017     Lab Results   Component Value Date    TRIG 167 12/27/2017     Lab Results   Component Value Date    CHOLHDLRATIO 4.1 07/31/2015     Lab Results   Component Value Date    NHDL 70 12/27/2017       Is the patient on a Statin? YES             Is the patient on Aspirin? NO    Medications     HMG CoA Reductase Inhibitors    atorvastatin (LIPITOR) 40 MG tablet          Last three blood pressure readings:  BP Readings from Last 3 Encounters:   01/12/18 132/74   12/27/17 138/88   12/18/17 156/84       Date of last diabetes office visit: 01/11/2018     Tobacco History:     History   Smoking Status     Never Smoker   Smokeless Tobacco     Never Used           Composite cancer screening  Chart review shows that this patient is due/due soon for the following None  Summary:    Patient is due/failing the following:   A1C    Action needed:   None at this time, pt has future appt today with diabetic education    Type of outreach:    none needed, pt has future appt with diabetic education today    Questions for provider review:      Shania Warren/KOBI [facility]---[facility]                                                                                                                                      Shania Warren/KOBI [facility]---[facility]       Chart routed to none .

## 2018-01-26 NOTE — LETTER
"    2018         RE: Crispin Rojas  [address] [facility] [id number]        Dear Colleague,    Thank you for referring your patient, Crispin Rojas, to the [facility]. Please see a copy of my visit note below.    LibrePro Continuous Glucose Monitor Interpretation   CGM Download: Week 2    Patient History:   1. Type of Diabetes: Type 2 diabetes  2. 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  3. Current treatment regimen:    Diabetes Medication(s)     Biguanides Sig    metFORMIN (GLUCOPHAGE-XR) 500 MG 24 hr tablet TAKE 2 TABLETS BY MOUTH TWICE DAILY WITH MEALS    Dipeptidyl Peptidase-4 (DPP-4) Inhibitors Sig    sitagliptin (JANUVIA) 100 MG tablet Take 1 tablet (100 mg) by mouth daily    Insulin Sig    insulin glargine (LANTUS SOLOSTAR) 100 UNIT/ML injection Take up to 50 units BID    NOVOLOG FLEXPEN 100 UNIT/ML soln INJECT 10 TO 35 UNITS UNDER THE SKIN THREE TIMES DAILY        Pt reports taking 35 units of Lantus bid - does vary the dose  Taking 35 units Novolog tid- also varies  Reports blood sugars \"seem higher\" since starting Januvia    4. Most recent A1c values:  Lab Results   Component Value Date    A1C 9.6 2017    A1C 7.5 2017    A1C 7.3 2016    A1C 6.7 2016    A1C 6.4 2016     5. Indication/s for LibrePro study: Difficult to manage hypoglycemia and/or hyperglycemia, despite multiple adjustments in self-monitoring of blood glucose and diabetes medication administration, Unexplained fluctuations in glucose values and increase in A1c.                Statistics:   1. Sensor worn for 15 days.  2. Statistics:    Percent in target is: 37%  Percent above target is: 62%  (percent above 250 mg/dl: 44%)  Percent below target is: 1%  3.  Estimated A1c: 9.8%                        Data evaluation:   Ambulatory Glucose Profile (AGP) shows the followin. Consistent " "day-to-day patterns noted: no specific pattern, noted significant BG variability.  2. Average glucose: 235 mg/dL.  SD (Standard Deviation): 108.9  3. Hypoglycemia:       Patient's Logbook shows the following:  Pt forgot to bring in his logsheet  Carbohydrate counting is: unable to assess, pt is carb aware  Medication and/or insulin dosing is: pt self adjusts    Education provided today:  AADE Self-Care Behaviors:  Healthy Eating: balancing carbs with insulin  Monitoring: benefits of personal CGM  Taking Medication: action of prescribed medication and when to take medications  Problem Solving: high blood glucose - causes, signs/symptoms, treatment and prevention and low blood glucose - causes, signs/symptoms, treatment and prevention    Opportunities for ongoing education and support in diabetes-self management were discussed.    Pt verbalized understanding of concepts discussed and recommendations provided today.       Educational and other materials:  CGM report    Assessment:  Works various shifts- has 3 jobs, activity recently limited due to foot wound. Works long hours, eats at various times (doesn't take Novolog while at work, doesn't get much time for meals), does manage to eat 3x/day- \"doesn't sleep much\", Tries to avoid breads and pastas, eats more fruits and vegetables, fish 3-4x/week.    Per CGM download- highest blood sugars occurred 1/14-1/18 while pt was out of test strips. Also had run out of Januvia for a couple of days. Otherwise, having extreme variability which is difficult to assess without pt food log and insulin doses.     Pt would benefit from personal CGM. Pt may also benefit from once daily Tresiba due to varied work schedule. Pt agreeable to recommendations.     Plan:  1. Monitoring:  Will order the [employer] personal CGM system. Will instruct on use at next appt.   2. Taking Medication:  Will ask Dr. Lopez for insulin orders, recommend change from Lantus to Tresiba 55 units once daily (TDD " Lantus 70 units x ~80%).  Try BG correction of 2 units of Novolog per 50 above 150 mg/dl. May likely need further adjustment of the Novolog base dose, and ideally move to I:C dosing. Pt will bring in food/insulin record to next visit.  3. Endo appt with Jasmin Arteaga NP.    4. CDE follow-up: 2/9/18    June Walker RD, CDE  Diabetes     Time Spent: 60 minutes  Any diabetes medication dose changes were made via the CDE Protocol and Collaborative Practice Agreement with the patient's primary care provider. A copy of this encounter was shared with the provider.

## 2018-01-26 NOTE — PATIENT INSTRUCTIONS
My Diabetes Care Goals:    Monitoring:  Will order the [employer] personal CGM system    Taking Medication:  Will ask Dr. Lopez for insulin orders to change from Lantus to Tresiba 55 units once daily.     Try to correct using 2 units of Novolog per 50 above 150 mg/dl. May need further adjustment of the Novolog once more information is available.    Please request an appointment with Jamsin Arteaga NP.     June Walker RD, LD, CDE  Diabetes      Bring blood glucose meter and logbook with you to all doctor and follow-up appointments.     [facility] for the [facility]:  For Your Diabetes Education and Nutrition Appointments Call:  [phone]   For Diabetes Education or Nutrition Related Questions:   Phone: [phone]  E-mail: [email or web address]  Fax: [phone]   If you need a medication refill please contact your pharmacy. Please allow 3 business days for your refills to be completed.    Instructions for emailing the Diabetes Educators    If you need to communicate a non-urgent message to a Diabetes Educator via email, please send to [email or web address].    Please follow the following email guidelines:    Subject line: Secure: your clinic name (example: Secure: [facility])  In the email please include: First name, middle initial, last name and date of birth.    We will be in touch with you within one (1) business day.

## 2018-01-26 NOTE — PROGRESS NOTES
"LibrePro Continuous Glucose Monitor Interpretation   CGM Download: Week 2    Patient History:   1. Type of Diabetes: Type 2 diabetes  2. 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  3. Current treatment regimen:    Diabetes Medication(s)     Biguanides Sig    metFORMIN (GLUCOPHAGE-XR) 500 MG 24 hr tablet TAKE 2 TABLETS BY MOUTH TWICE DAILY WITH MEALS    Dipeptidyl Peptidase-4 (DPP-4) Inhibitors Sig    sitagliptin (JANUVIA) 100 MG tablet Take 1 tablet (100 mg) by mouth daily    Insulin Sig    insulin glargine (LANTUS SOLOSTAR) 100 UNIT/ML injection Take up to 50 units BID    NOVOLOG FLEXPEN 100 UNIT/ML soln INJECT 10 TO 35 UNITS UNDER THE SKIN THREE TIMES DAILY        Pt reports taking 35 units of Lantus bid - does vary the dose  Taking 35 units Novolog tid- also varies  Reports blood sugars \"seem higher\" since starting Januvia    4. Most recent A1c values:  Lab Results   Component Value Date    A1C 9.6 2017    A1C 7.5 2017    A1C 7.3 2016    A1C 6.7 2016    A1C 6.4 2016     5. Indication/s for LibrePro study: Difficult to manage hypoglycemia and/or hyperglycemia, despite multiple adjustments in self-monitoring of blood glucose and diabetes medication administration, Unexplained fluctuations in glucose values and increase in A1c.                Statistics:   1. Sensor worn for 15 days.  2. Statistics:    Percent in target is: 37%  Percent above target is: 62%  (percent above 250 mg/dl: 44%)  Percent below target is: 1%  3.  Estimated A1c: 9.8%                        Data evaluation:   Ambulatory Glucose Profile (AGP) shows the followin. Consistent day-to-day patterns noted: no specific pattern, noted significant BG variability.  2. Average glucose: 235 mg/dL.  SD (Standard Deviation): 108.9  3. Hypoglycemia:       Patient's Logbook shows the following:  Pt forgot to bring in his logsheet  Carbohydrate counting " "is: unable to assess, pt is carb aware  Medication and/or insulin dosing is: pt self adjusts    Education provided today:  STEVE Self-Care Behaviors:  Healthy Eating: balancing carbs with insulin  Monitoring: benefits of personal CGM  Taking Medication: action of prescribed medication and when to take medications  Problem Solving: high blood glucose - causes, signs/symptoms, treatment and prevention and low blood glucose - causes, signs/symptoms, treatment and prevention    Opportunities for ongoing education and support in diabetes-self management were discussed.    Pt verbalized understanding of concepts discussed and recommendations provided today.       Educational and other materials:  CGM report    Assessment:  Works various shifts- has 3 jobs, activity recently limited due to foot wound. Works long hours, eats at various times (doesn't take Novolog while at work, doesn't get much time for meals), does manage to eat 3x/day- \"doesn't sleep much\", Tries to avoid breads and pastas, eats more fruits and vegetables, fish 3-4x/week.    Per CGM download- highest blood sugars occurred 1/14-1/18 while pt was out of test strips. Also had run out of Januvia for a couple of days. Otherwise, having extreme variability which is difficult to assess without pt food log and insulin doses.     Pt would benefit from personal CGM. Pt may also benefit from once daily Tresiba due to varied work schedule. Pt agreeable to recommendations.     Plan:  1. Monitoring:  Will order the [employer] personal CGM system. Will instruct on use at next appt.   2. Taking Medication:  Will ask Dr. Lopez for insulin orders, recommend change from Lantus to Tresiba 55 units once daily (TDD Lantus 70 units x ~80%).  Try BG correction of 2 units of Novolog per 50 above 150 mg/dl. May likely need further adjustment of the Novolog base dose, and ideally move to I:C dosing. Pt will bring in food/insulin record to next visit.  3. Endo appt with Jasmin Arteaga NP. "    4. CDE follow-up: 2/9/18    June Walker RD, CDE  Diabetes     Time Spent: 60 minutes  Any diabetes medication dose changes were made via the CDE Protocol and Collaborative Practice Agreement with the patient's primary care provider. A copy of this encounter was shared with the provider.

## 2018-01-28 RX ORDER — FLASH GLUCOSE SENSOR
1 KIT MISCELLANEOUS PRN
Qty: 1 DEVICE | Refills: 1 | Status: SHIPPED | OUTPATIENT
Start: 2018-01-28 | End: 2019-03-29

## 2018-01-28 RX ORDER — FLASH GLUCOSE SENSOR
KIT MISCELLANEOUS
Qty: 3 EACH | Refills: 3 | Status: SHIPPED | OUTPATIENT
Start: 2018-01-28 | End: 2018-03-02

## 2018-01-29 ENCOUNTER — HOSPITAL ENCOUNTER (OUTPATIENT)
Dept: MRI IMAGING | Facility: CLINIC | Age: 58
Discharge: HOME | End: 2018-01-29
Attending: ORTHOPAEDIC SURGERY | Admitting: ORTHOPAEDIC SURGERY
Payer: COMMERCIAL

## 2018-01-29 DIAGNOSIS — M12.812 ROTATOR CUFF ARTHROPATHY, LEFT: ICD-10-CM

## 2018-01-29 PROCEDURE — 73221 MRI JOINT UPR EXTREM W/O DYE: CPT | Mod: LT

## 2018-01-31 DIAGNOSIS — E11.52 TYPE 2 DIABETES MELLITUS WITH DIABETIC PERIPHERAL ANGIOPATHY AND GANGRENE, WITH LONG-TERM CURRENT USE OF INSULIN (H): ICD-10-CM

## 2018-01-31 DIAGNOSIS — Z79.4 TYPE 2 DIABETES MELLITUS WITH DIABETIC PERIPHERAL ANGIOPATHY AND GANGRENE, WITH LONG-TERM CURRENT USE OF INSULIN (H): ICD-10-CM

## 2018-02-01 RX ORDER — INSULIN ASPART 100 [IU]/ML
INJECTION, SOLUTION INTRAVENOUS; SUBCUTANEOUS
Qty: 30 ML | Refills: 1 | Status: SHIPPED | OUTPATIENT
Start: 2018-02-01 | End: 2018-03-02

## 2018-02-01 NOTE — TELEPHONE ENCOUNTER
Routing refill request to provider for review/approval because:  Labs out of range:  See below. A1C and micro albumin.     Julissa Escalante RN -- [facility]

## 2018-02-01 NOTE — TELEPHONE ENCOUNTER
"Requested Prescriptions   Pending Prescriptions Disp Refills     NOVOLOG FLEXPEN 100 UNIT/ML soln [Pharmacy Med Name: NOVOLOG FLEXPEN INJ 5X3ML (ORANGE)]    Last Written Prescription Date:  12/20/17  Last Fill Quantity: 30mL,  # refills: 0   Last Office Visit with BELKYS[facility] provider:  Jessica 12/27/17   Future Office Visit:    Next 5 appointments (look out 90 days)     Feb 12, 2018  8:00 AM CST   Return Visit with Valentino Molina DPM   [facility]    [address] [id number] [phone]            Mar 20, 2018  8:45 AM CDT   SHORT with Aden Lopez MD   [facility]    [address] [place] [id number] [phone]                30 mL 0     Sig: INJECT 10 TO 35 UNITS UNDER THE SKIN THREE TIMES DAILY    Short Acting Insulin Protocol Passed    1/31/2018  5:44 PM       Passed - Blood pressure less than 140/90 in past 6 months    BP Readings from Last 3 Encounters:   01/12/18 132/74   12/27/17 138/88   12/18/17 156/84                Passed - LDL on file in past 12 months    Recent Labs   Lab Test  12/27/17   1001   LDL  37            Passed - Microalbumin on file in past 12 months    Recent Labs   Lab Test  12/27/17   1002  12/17/14   MICROALB   --    --   10   MICROL  23   < >   --    UMALCR  24.55*   < >   --     < > = values in this interval not displayed.            Passed - Serum creatinine on file in past 12 months    Recent Labs   Lab Test  12/27/17   1001   CR  0.83            Passed - HgbA1C in past 3 or 6 months    Recent Labs   Lab Test  12/27/17   1001   A1C  9.6*            Passed - Patient is age 18 or older       Passed - Recent visit with authorizing provider's specialty in past 6 months    Patient had office visit in the last 6 months or has a visit in the next 30 days with authorizing provider.  See \"Patient Info\" tab in inbasket, or " "\"Choose Columns\" in Meds & Orders section of the refill encounter.            "

## 2018-02-02 ENCOUNTER — OFFICE VISIT (OUTPATIENT)
Dept: ENDOCRINOLOGY | Facility: CLINIC | Age: 58
End: 2018-02-02
Payer: COMMERCIAL

## 2018-02-02 VITALS
DIASTOLIC BLOOD PRESSURE: 70 MMHG | BODY MASS INDEX: 35.32 KG/M2 | WEIGHT: 250.4 LBS | HEART RATE: 58 BPM | SYSTOLIC BLOOD PRESSURE: 150 MMHG | OXYGEN SATURATION: 97 % | TEMPERATURE: 97.9 F

## 2018-02-02 DIAGNOSIS — E11.42 TYPE 2 DIABETES MELLITUS WITH PERIPHERAL NEUROPATHY (H): Primary | ICD-10-CM

## 2018-02-02 PROCEDURE — 99204 OFFICE O/P NEW MOD 45 MIN: CPT | Performed by: CLINICAL NURSE SPECIALIST

## 2018-02-02 NOTE — PATIENT INSTRUCTIONS
Once the prescription for Bydureon goes through, you can start the Bydureon injections once weekly.    Stop the Januvia when you start the Bydureon.    Start Tresiba tomorrow morning - 55 units once daily.  I would skip the Lantus injection tonight to prevent low blood sugars when you start the Tresiba.    Contact me or June if your blood sugars are running high with the Tresiba and we can adjust the Tresiba dose.      Follow up with me in one month to review blood sugars and make more adjustments if needed.  Jasmin Arteaga NP  Endocrinology

## 2018-02-02 NOTE — LETTER
2018         RE: Crispin Rojas  [id number] [facility] [address]        Dear Colleague,    Thank you for referring your patient, Crsipin Rojas, to the [facility]. Please see a copy of my visit note below.    Name: Crispin Rojas  Seen today for Diabetes.  HPI:  Crispin Rojas is a 57 year old male who presents for the evaluation/management of:    1. Type 2 DM:  Originally diagnosed at the age of: .  Insulin treated 15 years.  Current Regimen: Metformin 1000 mg bid, Januvia 100 mg qd, Lantus 35 units bid, Novolog 30 units tid + 2:50>150  Plans to start Tresiba tomorrow.      BS checks:   Average Meter Download:     Right foot ulcer developed in September, blood sugars have been high since that time   History of foot ulcers - Avid golfer, gets blisters from golf shoes, + history of osteomyelitis    Complications:   Diabetes Complications  Description / Detail    Diabetic Retinopathy   + diabetic retinopathy, last eye exam 2017, next appointment 3/2/2018   CAD / PAD     Neuropathy   Yes, sees Dr. Molina   Nephropathy / Microalbuminuria     Gastroparesis     Hypoglycemia Unawareness                  Statistics:   1. Sensor worn for 15 days.  2. Statistics:    Percent in target is: 37%  Percent above target is: 62%  (percent above 250 mg/dl: 44%)  Percent below target is: 1%  3.  Estimated A1c: 9.8%      Data evaluation:   Ambulatory Glucose Profile (AGP) shows the followin. Consistent day-to-day patterns noted: no specific pattern, noted significant BG variability.  2. Average glucose: 235 mg/dL.  SD (Standard Deviation): 108.9  3. Hypoglycemia:        2. Hypertension: Blood Pressure today:   BP Readings from Last 3 Encounters:   18 154/74   18 150/70   18 132/74   .  Blood pressure medications include Amlodipine 5 mg qd, lisinopril 20 mg qd.  Takes medications everyday without forgetting a dose.  Denies feeling lightheaded or dizzy.  3.  Hyperlipidemia: Takes Atorvastatin 40 mg qd for lipid control.  Denies muscle aches of pains.       4. Prevention:  Flu Shot-   Pneumovax- 11/2015  Opthalmology-annaully  Dental-recommend semiannually  ASA-  Smoking-   PMH/PSH:  Past Medical History:   Diagnosis Date     Cerebral infarction (H)      Chronic infection      H/O heartburn      History of heartburn      Hypertension      Numbness and tingling      Obesity      Renal stone      Type II or unspecified type diabetes mellitus without mention of complication, not stated as uncontrolled      Past Surgical History:   Procedure Laterality Date     AMPUTATE FOOT Left 8/18/2016    Procedure: AMPUTATE FOOT;  Surgeon: Jack Younger DPM;  Location: [facility] OR     AMPUTATE TOE(S) Right 2/1/2016    Procedure: AMPUTATE TOE(S);  Surgeon: Rachelle Manriquez DPM, Pod;  Location: [facility] OR     ANGIOGRAM       COLONOSCOPY       COMBINED CYSTOSCOPY, RETROGRADES, URETEROSCOPY, INSERT STENT Left 8/21/2016    Procedure: COMBINED CYSTOSCOPY, RETROGRADES, URETEROSCOPY, INSERT STENT;  Surgeon: Artemio Valenzuela MD;  Location: [facility] OR     COMBINED CYSTOSCOPY, RETROGRADES, URETEROSCOPY, LASER HOLMIUM LITHOTRIPSY URETER(S), INSERT STENT Left 10/3/2016    Procedure: COMBINED CYSTOSCOPY, RETROGRADES, URETEROSCOPY, LASER HOLMIUM LITHOTRIPSY URETER(S), INSERT STENT;  Surgeon: Artemio Valenzuela MD;  Location: [facility] OR     EXTRACORPOREAL SHOCK WAVE LITHOTRIPSY (ESWL) Left 9/8/2016    Procedure: EXTRACORPOREAL SHOCK WAVE LITHOTRIPSY (ESWL);  Surgeon: Artemio Valenzuela MD;  Location: [facility] OR     RECESSION GASTROCNEMIUS Right 2/1/2016    Procedure: RECESSION GASTROCNEMIUS;  Surgeon: Rachelle Manriquez DPM, Pod;  Location: [facility] OR     Family Hx:  Family History   Problem Relation Age of Onset     Arthritis Mother      SLE     Connective Tissue Disorder Mother      lupus     DIABETES Mother      CEREBROVASCULAR DISEASE Mother      CANCER Mother      DIABETES Father      DIABETES Maternal  Grandfather      DIABETES Sister      Thyroid disease:          DM2: Yes: Strong family history on mothers side - mom, 7 maternal uncles, two maternal aunts, + MGF.         Autoimmune: DM1, SLE, RA, Vitiligo Yes: Lupus    Social Hx:  Social History     Social History     Marital status:      Spouse name: Sydney     Number of children: 2     Years of education: N/A     Occupational History     marketing [employer]     Social History Main Topics     Smoking status: Never Smoker     Smokeless tobacco: Never Used     Alcohol use 0.0 oz/week     0 Standard drinks or equivalent per week      Comment: rare---red wine 2x per week     Drug use: No     Sexual activity: Yes     Partners: Female     Other Topics Concern     Parent/Sibling W/ Cabg, Mi Or Angioplasty Before 65f 55m? No     Social History Narrative          MEDICATIONS:  has a current medication list which includes the following prescription(s): exenatide er, novolog flexpen, insulin degludec, continuous blood glucose monitoring, freestyle lilly reader, pen needles, metformin, ketoconazole, blood glucose, atorvastatin, novolog flexpen, lisinopril, gabapentin, cialis, blood glucose monitoring, blood glucose monitoring, co-enzyme q10, cholecalciferol, multiple vitamins-minerals, omega-3 fatty acids, thin, bd insulin syringe ultrafine, and amlodipine.    ROS     ROS: 10 point ROS neg other than the symptoms noted above in the HPI.    Physical Exam   VS: /70 (BP Location: Left arm, Patient Position: Chair, Cuff Size: Adult Large)  Pulse 58  Temp 97.9  F (36.6  C) (Oral)  Wt 113.6 kg (250 lb 6.4 oz)  SpO2 97%  BMI 35.32 kg/m2  GENERAL: AXOX3, NAD, well dressed, answering questions appropriately, appears stated age.  HEENT: OP clear, no LAD, no TM, non-tender, no exophthalmos, no proptosis, EOMI, no lig lag, no retraction  CV: RRR, no rubs, gallops, no murmurs  LUNGS: CTAB, no wheezes, rales, or rhonchi  ABDOMEN: soft, nontender,  nondistended, +BS, no organomegaly  EXTREMITIES: no edema, +pulses, no rashes, no lesions  NEUROLOGY: CN grossly intact, no tremors  MSK: grossly intact  SKIN: no rashes, no lesions    LABS:  A1c:   Component      Latest Ref Rng & Units 8/30/2016 12/5/2016 3/31/2017 12/27/2017   Hemoglobin A1C      4.3 - 6.0 % 6.7 (H) 7.3 (H) 7.5 (H) 9.6 (H)     Basic Metabolic Panel:  !COMPREHENSIVE Latest Ref Rng & Units 12/27/2017   SODIUM 133 - 144 mmol/L 139   POTASSIUM 3.4 - 5.3 mmol/L 4.4   CHLORIDE 94 - 109 mmol/L 105   CO2 mmol/L    BUN 7 - 30 mg/dL 20   Creatinine 0.66 - 1.25 mg/dL    Creatinine 0.66 - 1.25 mg/dL 0.83   Glucose 70 - 99 mg/dL 255 (H)   ANION GAP 3 - 14 mmol/L 8   CALCIUM 8.5 - 10.1 mg/dL 8.3 (L)     LFTS/Cholesterol Panel:  !LIPID/HEPATIC Latest Ref Rng & Units 12/27/2017   CHOLESTEROL <200 mg/dL 96   TRIGLYCERIDES <150 mg/dL 167 (H)   HDL CHOLESTEROL >39 mg/dL 26 (L)   LDL CHOLESTEROL DIRECT 0 - 129 mg/dL    LDL CHOLESTEROL, CALCULATED <100 mg/dL 37   VLDL-CHOLESTEROL 0 - 30 mg/dL    NON HDL CHOLESTEROL <130 mg/dL 70   CHOLESTEROL/HDL RATIO 0.0 - 5.0    AST 0 - 45 U/L 40   ALT 0 - 70 U/L 53     Thyroid Function:   !THYROID Latest Ref Rng & Units 12/27/2017   TSH 0.40 - 4.00 mU/L 1.78     Urine MicroAlbumin:  Component      Latest Ref Rng & Units 12/27/2017   Creatinine Urine      mg/dL 95   Albumin Urine mg/L      mg/L 23   Albumin Urine mg/g Cr      0 - 17 mg/g Cr 24.55 (H)     Vitamin D:    All pertinent notes, labs, and images personally reviewed by me.     A/P  Mr.Patrick AKASH Rojas is a 57 year old here for the evaluation/management of diabetes:    1. DM2 - Uncontrolled.  Will order the [employer] CGM system.   Recommend change from Lantus to Tresiba 55 units once daily (TDD Lantus 70 units x ~80%).   Discontinue Januvia.  Start Bydureon.  Try BG correction of 2 units of Novolog per 50 above 150 mg/dl.   CDE follow-up: 2/9/18  There is some variability among people, most will usually develop  symptoms suggestive of hypoglycemia when blood glucose levels are lowered to the mid 60's. The first set of symptoms are called adrenergic. Patients may experience any of the following nervousness, sweating, intense hunger, trembling, weakness, palpitations, and difficulty speaking.   The acute management of hypoglycemia involves the rapid delivery of a source of easily absorbed sugar. Regular soda, juice, lifesavers, table sugar, are good options. 15 grams of glucose is the dose that is given, followed by an assessment of symptoms and a blood glucose check if possible. If after 10 minutes there is no improvement, another 10-15 grams should be given. This can be repeated up to three times. The equivalency of 10-15 grams of glucose (approximate servings) are: 3-5 hard candies, 3 teaspoons of sugar, or 1/2 cup of regular soda or juice.      2. Hypertension - Uncontrolled.  F/u with his PCP for BP.    3. Hyperlipidemia - On statin therapy    Labs ordered today: No orders of the defined types were placed in this encounter.      Radiology/Consults ordered today: None    All questions were answered.  The patient indicates understanding of the above issues and agrees with the plan set forth.  Total face to face time greater than or equal to 45 minutes.       Follow-up:  1 month    Jasmin Arteaga NP  Endocrinology  [facility]  CC:       Again, thank you for allowing me to participate in the care of your patient.        Sincerely,        SARAH Raymundo CNP

## 2018-02-02 NOTE — MR AVS SNAPSHOT
After Visit Summary   2/2/2018    Crispin Rojas    MRN: [id number]           Patient Information     Date Of Birth          1960        Visit Information        Provider Department      2/2/2018 7:00 AM Jasmin Arteaga APRN CNP [facility]        Today's Diagnoses     Type 2 diabetes mellitus with peripheral neuropathy (H)    -  1      Care Instructions        Once the prescription for Bydureon goes through, you can start the Bydureon injections once weekly.    Stop the Januvia when you start the Bydureon.    Start Tresiba tomorrow morning - 55 units once daily.  I would skip the Lantus injection tonight to prevent low blood sugars when you start the Tresiba.    Contact me or June if your blood sugars are running high with the Tresiba and we can adjust the Tresiba dose.      Follow up with me in one month to review blood sugars and make more adjustments if needed.  Jasmin Arteaga NP  Endocrinology            Follow-ups after your visit        Your next 10 appointments already scheduled     Feb 09, 2018  9:30 AM CST   Diabetic Education with June Walker   [facility] ([address] [address] [phone]            Feb 12, 2018  8:00 AM CST   Return Visit with Valentino Molina DPM   [facility]    [address] [place] [id number] [phone]            Mar 20, 2018  8:45 AM CDT   SHORT with Aden Lopez MD   [facility] ([address] [address] [phone]              Who to contact     If you have questions or need follow up information about today's clinic visit or your schedule please contact [facility] directly at [phone].  Normal or non-critical lab and imaging results will be  communicated to you by [employer], letter or phone within 4 business days after the clinic has received the results. If you do not hear from us within 7 days, please contact the clinic through PaperShare or phone. If you have a critical or abnormal lab result, we will notify you by phone as soon as possible.  Submit refill requests through PaperShare or call your pharmacy and they will forward the refill request to us. Please allow 3 business days for your refill to be completed.          Additional Information About Your Visit        PaperShare Information     PaperShare gives you secure access to your electronic health record. If you see a primary care provider, you can also send messages to your care team and make appointments. If you have questions, please call your primary care clinic.  If you do not have a primary care provider, please call [phone] and they will assist you.        Care EveryWhere ID     This is your Care EveryWhere ID. This could be used by other organizations to access your [facility] medical records  [id number]        Your Vitals Were     Pulse Temperature Pulse Oximetry BMI (Body Mass Index)          58 97.9  F (36.6  C) (Oral) 97% 35.32 kg/m2         Blood Pressure from Last 3 Encounters:   02/02/18 150/76   01/12/18 132/74   12/27/17 138/88    Weight from Last 3 Encounters:   02/02/18 250 lb 6.4 oz (113.6 kg)   01/22/18 250 lb (113.4 kg)   01/12/18 250 lb (113.4 kg)              Today, you had the following     No orders found for display         Today's Medication Changes          These changes are accurate as of 2/2/18  8:12 AM.  If you have any questions, ask your nurse or doctor.               Start taking these medicines.        Dose/Directions    exenatide ER 2 MG pen   Commonly known as:  BYDUREON   Used for:  Type 2 diabetes mellitus with peripheral neuropathy (H)   Started by:  Jasmin Arteaga APRN CNP        Dose:  2 mg   Inject 2 mg Subcutaneous every 7 days   Quantity:  4 kit    Refills:  3            Where to get your medicines      These medications were sent to [facility] - [place], [place] - [address] AT [facility] [address]  [address], [facility] [address]     Phone:  [phone]     exenatide ER 2 MG pen                Primary Care Provider Office Phone # Fax #    Adne Ethan Lopez [phone] [address] [facility] [address]        Equal Access to Services     [facility] : Hadii aad ku hadasho Soomaali, waaxda luqadaha, qaybta kaalmada adeegyada, waxay idiin hayaan adejefry moran . So [facility] [phone].    ATENCIÓN: Si habla español, tiene a gomez disposición servicios gratuitos de asistencia lingüística. [facility] [phone].    We comply with applicable federal civil rights laws and [place] laws. We do not discriminate on the basis of race, color, national origin, age, disability, sex, sexual orientation, or gender identity.            Thank you!     Thank you for choosing [facility]  for your care. Our goal is always to provide you with excellent care. Hearing back from our patients is one way we can continue to improve our services. Please take a few minutes to complete the written survey that you may receive in the mail after your visit with us. Thank you!             Your Updated Medication List - Protect others around you: Learn how to safely use, store and throw away your medicines at www.disposemymeds.org.          This list is accurate as of 2/2/18  8:12 AM.  Always use your most recent med list.                   Brand Name Dispense Instructions for use Diagnosis    amLODIPine 5 MG tablet    NORVASC    90 tablet    TAKE 1 TABLET(5 MG) BY MOUTH DAILY    Other specified transient cerebral ischemias       atorvastatin 40 MG tablet    LIPITOR    90 tablet    TAKE 1 TABLET BY MOUTH EVERY DAY    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), Hyperlipidemia LDL goal  "<100       BD insulin syringe ultrafine 30G X 1/2\" 0.3 ML   Generic drug:  insulin syringe-needle U-100     100 each    1 Syringe daily. Use one syringe  daily or as directed.    Type 2 diabetes, HbA1C goal < 8% (H)       blood glucose lancets standard    no brand specified    100 each    Use to test blood sugar 3 times daily or as directed.    Type 2 diabetes mellitus with diabetic neuropathy, with long-term current use of insulin (H)       blood glucose monitoring meter device kit    no brand specified    1 kit    Use to test blood sugar 3 times daily or as directed.    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)       CIALIS 5 MG tablet   Generic drug:  tadalafil     30 tablet    TAKE 1 TABLET BY MOUTH EVERY DAY AS NEEDED    Erectile dysfunction due to diseases classified elsewhere       Co-Enzyme Q10 100 MG Caps      Take 100 mg by mouth daily        continuous blood glucose monitoring sensor     3 each    For use with Freestyle Corwin Flash  for continuous monitioring of blood glucose levels. Replace sensor every 10 days.    Type 2 diabetes mellitus with diabetic peripheral angiopathy and gangrene, with long-term current use of insulin (H)       exenatide ER 2 MG pen    BYDUREON    4 kit    Inject 2 mg Subcutaneous every 7 days    Type 2 diabetes mellitus with peripheral neuropathy (H)       FREESTYLE CORWIN READER Iman     1 Device    1 Device as needed    Type 2 diabetes mellitus with diabetic peripheral angiopathy and gangrene, with long-term current use of insulin (H)       gabapentin 100 MG capsule    NEURONTIN     Take 300 mg by mouth At Bedtime        insulin degludec 100 UNIT/ML pen    TRESIBA    30 mL    Inject 55 Units Subcutaneous daily    Type 2 diabetes mellitus with diabetic " peripheral angiopathy and gangrene, with long-term current use of insulin (H)       ketoconazole 2 % shampoo    NIZORAL    120 mL    APPLY TO THE AFFECTED AREA AND WASH OFF AFTER 5 MINUTES.    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)       MULTIVITAMIN PO      Take 1 tablet by mouth daily        * NovoLOG FLEXPEN 100 UNIT/ML injection   Generic drug:  insulin aspart     30 mL    INJECT 10 TO 35 UNITS UNDER THE SKIN THREE TIMES DAILY    Type 2 diabetes mellitus with diabetic peripheral angiopathy and gangrene, with long-term current use of insulin (H)       * NovoLOG FLEXPEN 100 UNIT/ML injection   Generic drug:  insulin aspart     30 mL    INJECT 10 TO 35 UNITS UNDER THE SKIN THREE TIMES DAILY    Type 2 diabetes mellitus with diabetic peripheral angiopathy and gangrene, with long-term current use of insulin (H)       OMEGA-3 FISH OIL PO      Take 2 g by mouth daily        order for DME     1 Device    CAM boot, Large    Type 2 diabetes mellitus with peripheral neuropathy (H), Skin ulcer of right foot with fat layer exposed (H)       Pen Needles 31G X 5 MM Misc     100 each    1 Device 5 times daily , TWICE DAILY WITH LANTUS AND 3 TIMES A DAY PRN WITH NOVOLOG FLEXPEN    Type 2 diabetes mellitus with peripheral neuropathy (H)       sitagliptin 100 MG tablet    JANUVIA    90 tablet    Take 1 tablet (100 mg) by mouth daily    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    Type 2 diabetes, HbA1C goal < 8% (H)       VITAMIN D3 PO      Take 1,000 Units by mouth daily        * Notice:  This list has 2 medication(s) that are the same as other  medications prescribed for you. Read the directions carefully, and ask your doctor or other care provider to review them with you.

## 2018-02-02 NOTE — PROGRESS NOTES
Name: Crispin Rojas  Seen today for Diabetes.  HPI:  Crispin Rojas is a 57 year old male who presents for the evaluation/management of:    1. Type 2 DM:  Originally diagnosed at the age of: .  Insulin treated 15 years.  Current Regimen: Metformin 1000 mg bid, Januvia 100 mg qd, Lantus 35 units bid, Novolog 30 units tid + 2:50>150  Plans to start Tresiba tomorrow.      BS checks:   Average Meter Download:     Right foot ulcer developed in September, blood sugars have been high since that time   History of foot ulcers - Avid golfer, gets blisters from golf shoes, + history of osteomyelitis    Complications:   Diabetes Complications  Description / Detail    Diabetic Retinopathy   + diabetic retinopathy, last eye exam 2017, next appointment 3/2/2018   CAD / PAD     Neuropathy   Yes, sees Dr. Molina   Nephropathy / Microalbuminuria     Gastroparesis     Hypoglycemia Unawareness                  Statistics:   1. Sensor worn for 15 days.  2. Statistics:    Percent in target is: 37%  Percent above target is: 62%  (percent above 250 mg/dl: 44%)  Percent below target is: 1%  3.  Estimated A1c: 9.8%      Data evaluation:   Ambulatory Glucose Profile (AGP) shows the followin. Consistent day-to-day patterns noted: no specific pattern, noted significant BG variability.  2. Average glucose: 235 mg/dL.  SD (Standard Deviation): 108.9  3. Hypoglycemia:        2. Hypertension: Blood Pressure today:   BP Readings from Last 3 Encounters:   18 154/74   18 150/70   18 132/74   .  Blood pressure medications include Amlodipine 5 mg qd, lisinopril 20 mg qd.  Takes medications everyday without forgetting a dose.  Denies feeling lightheaded or dizzy.  3. Hyperlipidemia: Takes Atorvastatin 40 mg qd for lipid control.  Denies muscle aches of pains.       4. Prevention:  Flu Shot-   Pneumovax- 2015  Opthalmology-annaully  Dental-recommend semiannually  ASA-  Smoking-   PMH/PSH:  Past Medical History:    Diagnosis Date     Cerebral infarction (H)      Chronic infection      H/O heartburn      History of heartburn      Hypertension      Numbness and tingling      Obesity      Renal stone      Type II or unspecified type diabetes mellitus without mention of complication, not stated as uncontrolled      Past Surgical History:   Procedure Laterality Date     AMPUTATE FOOT Left 8/18/2016    Procedure: AMPUTATE FOOT;  Surgeon: Jack Younger DPM;  Location: [facility] OR     AMPUTATE TOE(S) Right 2/1/2016    Procedure: AMPUTATE TOE(S);  Surgeon: Rachelle Manriquez DPM, Pod;  Location: [facility] OR     ANGIOGRAM       COLONOSCOPY       COMBINED CYSTOSCOPY, RETROGRADES, URETEROSCOPY, INSERT STENT Left 8/21/2016    Procedure: COMBINED CYSTOSCOPY, RETROGRADES, URETEROSCOPY, INSERT STENT;  Surgeon: Artemio Valenzuela MD;  Location: [facility] OR     COMBINED CYSTOSCOPY, RETROGRADES, URETEROSCOPY, LASER HOLMIUM LITHOTRIPSY URETER(S), INSERT STENT Left 10/3/2016    Procedure: COMBINED CYSTOSCOPY, RETROGRADES, URETEROSCOPY, LASER HOLMIUM LITHOTRIPSY URETER(S), INSERT STENT;  Surgeon: Artemio Valenzuela MD;  Location: [facility] OR     EXTRACORPOREAL SHOCK WAVE LITHOTRIPSY (ESWL) Left 9/8/2016    Procedure: EXTRACORPOREAL SHOCK WAVE LITHOTRIPSY (ESWL);  Surgeon: Artemio Valenzuela MD;  Location: [facility] OR     RECESSION GASTROCNEMIUS Right 2/1/2016    Procedure: RECESSION GASTROCNEMIUS;  Surgeon: Rachelle Manriquez DPM, Pod;  Location: [facility] OR     Family Hx:  Family History   Problem Relation Age of Onset     Arthritis Mother      SLE     Connective Tissue Disorder Mother      lupus     DIABETES Mother      CEREBROVASCULAR DISEASE Mother      CANCER Mother      DIABETES Father      DIABETES Maternal Grandfather      DIABETES Sister      Thyroid disease:          DM2: Yes: Strong family history on mothers side - mom, 7 maternal uncles, two maternal aunts, + MGF.         Autoimmune: DM1, SLE, RA, Vitiligo Yes: Lupus    Social Hx:  Social History      Social History     Marital status:      Spouse name: Sydney     Number of children: 2     Years of education: N/A     Occupational History     [employer]eting [employer]     Social History Main Topics     Smoking status: Never Smoker     Smokeless tobacco: Never Used     Alcohol use 0.0 oz/week     0 Standard drinks or equivalent per week      Comment: rare---red wine 2x per week     Drug use: No     Sexual activity: Yes     Partners: Female     Other Topics Concern     Parent/Sibling W/ Cabg, Mi Or Angioplasty Before 65f 55m? No     Social History Narrative          MEDICATIONS:  has a current medication list which includes the following prescription(s): exenatide er, novolog flexpen, insulin degludec, continuous blood glucose monitoring, freestyle lilly reader, pen needles, metformin, ketoconazole, blood glucose, atorvastatin, novolog flexpen, lisinopril, gabapentin, cialis, blood glucose monitoring, blood glucose monitoring, co-enzyme q10, cholecalciferol, multiple vitamins-minerals, omega-3 fatty acids, thin, bd insulin syringe ultrafine, and amlodipine.    ROS     ROS: 10 point ROS neg other than the symptoms noted above in the HPI.    Physical Exam   VS: /70 (BP Location: Left arm, Patient Position: Chair, Cuff Size: Adult Large)  Pulse 58  Temp 97.9  F (36.6  C) (Oral)  Wt 113.6 kg (250 lb 6.4 oz)  SpO2 97%  BMI 35.32 kg/m2  GENERAL: AXOX3, NAD, well dressed, answering questions appropriately, appears stated age.  HEENT: OP clear, no LAD, no TM, non-tender, no exophthalmos, no proptosis, EOMI, no lig lag, no retraction  CV: RRR, no rubs, gallops, no murmurs  LUNGS: CTAB, no wheezes, rales, or rhonchi  ABDOMEN: soft, nontender, nondistended, +BS, no organomegaly  EXTREMITIES: no edema, +pulses, no rashes, no lesions  NEUROLOGY: CN grossly intact, no tremors  MSK: grossly intact  SKIN: no rashes, no lesions    LABS:  A1c:   Component      Latest Ref Rng & Units 8/30/2016 12/5/2016  3/31/2017 12/27/2017   Hemoglobin A1C      4.3 - 6.0 % 6.7 (H) 7.3 (H) 7.5 (H) 9.6 (H)     Basic Metabolic Panel:  !COMPREHENSIVE Latest Ref Rng & Units 12/27/2017   SODIUM 133 - 144 mmol/L 139   POTASSIUM 3.4 - 5.3 mmol/L 4.4   CHLORIDE 94 - 109 mmol/L 105   CO2 mmol/L    BUN 7 - 30 mg/dL 20   Creatinine 0.66 - 1.25 mg/dL    Creatinine 0.66 - 1.25 mg/dL 0.83   Glucose 70 - 99 mg/dL 255 (H)   ANION GAP 3 - 14 mmol/L 8   CALCIUM 8.5 - 10.1 mg/dL 8.3 (L)     LFTS/Cholesterol Panel:  !LIPID/HEPATIC Latest Ref Rng & Units 12/27/2017   CHOLESTEROL <200 mg/dL 96   TRIGLYCERIDES <150 mg/dL 167 (H)   HDL CHOLESTEROL >39 mg/dL 26 (L)   LDL CHOLESTEROL DIRECT 0 - 129 mg/dL    LDL CHOLESTEROL, CALCULATED <100 mg/dL 37   VLDL-CHOLESTEROL 0 - 30 mg/dL    NON HDL CHOLESTEROL <130 mg/dL 70   CHOLESTEROL/HDL RATIO 0.0 - 5.0    AST 0 - 45 U/L 40   ALT 0 - 70 U/L 53     Thyroid Function:   !THYROID Latest Ref Rng & Units 12/27/2017   TSH 0.40 - 4.00 mU/L 1.78     Urine MicroAlbumin:  Component      Latest Ref Rng & Units 12/27/2017   Creatinine Urine      mg/dL 95   Albumin Urine mg/L      mg/L 23   Albumin Urine mg/g Cr      0 - 17 mg/g Cr 24.55 (H)     Vitamin D:    All pertinent notes, labs, and images personally reviewed by me.     A/P  Mr.Patrick AKASH Rojas is a 57 year old here for the evaluation/management of diabetes:    1. DM2 - Uncontrolled.  Will order the [employer] personal CGM system.   Recommend change from Lantus to Tresiba 55 units once daily (TDD Lantus 70 units x ~80%).   Discontinue Januvia.  Start Bydureon.  Try BG correction of 2 units of Novolog per 50 above 150 mg/dl.   CDE follow-up: 2/9/18  There is some variability among people, most will usually develop symptoms suggestive of hypoglycemia when blood glucose levels are lowered to the mid 60's. The first set of symptoms are called adrenergic. Patients may experience any of the following nervousness, sweating, intense hunger, trembling, weakness, palpitations, and  difficulty speaking.   The acute management of hypoglycemia involves the rapid delivery of a source of easily absorbed sugar. Regular soda, juice, lifesavers, table sugar, are good options. 15 grams of glucose is the dose that is given, followed by an assessment of symptoms and a blood glucose check if possible. If after 10 minutes there is no improvement, another 10-15 grams should be given. This can be repeated up to three times. The equivalency of 10-15 grams of glucose (approximate servings) are: 3-5 hard candies, 3 teaspoons of sugar, or 1/2 cup of regular soda or juice.      2. Hypertension - Uncontrolled.  F/u with his PCP for BP.    3. Hyperlipidemia - On statin therapy    Labs ordered today: No orders of the defined types were placed in this encounter.      Radiology/Consults ordered today: None    All questions were answered.  The patient indicates understanding of the above issues and agrees with the plan set forth.  Total face to face time greater than or equal to 45 minutes.       Follow-up:  1 month    Jasmin Arteaga NP  Endocrinology  [facility]  CC:

## 2018-02-04 DIAGNOSIS — G45.8 OTHER SPECIFIED TRANSIENT CEREBRAL ISCHEMIAS: ICD-10-CM

## 2018-02-04 NOTE — TELEPHONE ENCOUNTER
"Requested Prescriptions   Pending Prescriptions Disp Refills     amLODIPine (NORVASC) 5 MG tablet [Pharmacy Med Name: AMLODIPINE BESYLATE 5MG TABLETS]  Last Written Prescription Date:  10/26/2017  Last Fill Quantity: 90 tablet,  # refills: 0   Last Office Visit with FMVINCENT provider:  2/2/2018     Future Office Visit:    Next 5 appointments (look out 90 days)     Feb 12, 2018  8:00 AM CST   [facility]    [address] [address]   [phone]            Mar 02, 2018  7:00 AM CST   Return Visit with SARAH Raymundo CNP   [facility] ([address] [phone]            Mar 20, 2018  8:45 AM CDT   SHORT with Aden Lopez MD   [facility] ([address] [phone] [phone]               90 tablet 0     Sig: TAKE 1 TABLET(5 MG) BY MOUTH DAILY    Calcium Channel Blockers Protocol  Failed    2/4/2018 12:50 PM       Failed - Blood pressure under 140/90    BP Readings from Last 3 Encounters:   02/02/18 150/70   01/12/18 132/74   12/27/17 138/88          Passed - Recent or future visit with authorizing provider    Patient had office visit in the last year or has a visit in the next 30 days with authorizing provider.  See \"Patient Info\" tab in inbasket, or \"Choose Columns\" in Meds & Orders section of the refill encounter.        Passed - Patient is age 18 or older       Passed - Normal serum creatinine on file in past 12 months    Recent Labs   Lab Test  12/27/17   1001   CR  0.83             "

## 2018-02-06 RX ORDER — AMLODIPINE BESYLATE 5 MG/1
TABLET ORAL
Qty: 90 TABLET | Refills: 0 | Status: SHIPPED | OUTPATIENT
Start: 2018-02-06 | End: 2018-05-01

## 2018-02-06 NOTE — TELEPHONE ENCOUNTER
Medication is being filled for 1 time refill only due to:  Patient needs to be seen because Coming due for office visit with PCP at end of March. .     Julissa Escalante RN -- [facility]

## 2018-02-09 ENCOUNTER — ALLIED HEALTH/NURSE VISIT (OUTPATIENT)
Dept: EDUCATION SERVICES | Facility: CLINIC | Age: 58
End: 2018-02-09
Payer: COMMERCIAL

## 2018-02-09 DIAGNOSIS — E11.42 TYPE 2 DIABETES MELLITUS WITH PERIPHERAL NEUROPATHY (H): Primary | ICD-10-CM

## 2018-02-09 PROCEDURE — G0108 DIAB MANAGE TRN  PER INDIV: HCPCS | Performed by: DIETITIAN, REGISTERED

## 2018-02-09 NOTE — PROGRESS NOTES
"Diabetes Self Management Training: Follow-up Visit    Crispin Rojas presents today for education and evaluation of glucose control related to Type 2 diabetes.    He is accompanied by self    Patient's diabetes management related comments/concerns: is pleased with the improvement in his blood sugars    Patient would like this visit to be focused around the following diabetes-related behaviors and goals: Monitoring and Taking Medication    ASSESSMENT:  Patient Problem List reviewed for relevant medical history and current medical status.    Current Diabetes Management per Patient:    Diabetes Medication(s)     Biguanides Sig    metFORMIN (GLUCOPHAGE-XR) 500 MG 24 hr tablet TAKE 2 TABLETS BY MOUTH TWICE DAILY WITH MEALS    Dipeptidyl Peptidase-4 (DPP-4) Inhibitors Sig    sitagliptin (JANUVIA) 100 MG tablet Take 1 tablet (100 mg) by mouth daily    Insulin Sig    NOVOLOG FLEXPEN 100 UNIT/ML soln INJECT 10 TO 35 UNITS UNDER THE SKIN THREE TIMES DAILY    insulin degludec (TRESIBA) 100 UNIT/ML pen Inject 55 Units Subcutaneous daily    NOVOLOG FLEXPEN 100 UNIT/ML soln INJECT 10 TO 35 UNITS UNDER THE SKIN THREE TIMES DAILY    Incretin Mimetic Agents (GLP-1 Receptor Agonists) Sig    exenatide ER (BYDUREON) 2 MG pen Inject 2 mg Subcutaneous every 7 days        Has not started Bydureon yet- \"has 5 pills of Januvia left\"  Ran out of Metformin last week- plans to  today  Still using up supply of Lantus before switching to Tresiba  Is taking 20-45 units Novolog before meals (base dose 35 units)    BG meter: Contour Next  Meter, has Rx for personal Corwin CGM- has not picked up yet  BG results:       BG values are: improved  Patient's most recent   Lab Results   Component Value Date    A1C 9.6 12/27/2017    is not meeting goal of <7.0    Nutrition:  Did not discuss at today's visit    Physical Activity:    Limitations: foot ulcer    Diabetes Complications:  Chronic Complications: neuropathy    Vitals:  There were no vitals " "taken for this visit.  Estimated body mass index is 35.32 kg/(m^2) as calculated from the following:    Height as of 1/22/18: 1.793 m (5' 10.6\").    Weight as of 2/2/18: 113.6 kg (250 lb 6.4 oz).   Last 3 BP:   BP Readings from Last 3 Encounters:   02/02/18 150/70   01/12/18 132/74   12/27/17 138/88       History   Smoking Status     Never Smoker   Smokeless Tobacco     Never Used       Labs:  Lab Results   Component Value Date    A1C 9.6 12/27/2017     Lab Results   Component Value Date     12/27/2017     Lab Results   Component Value Date    LDL 37 12/27/2017     HDL Cholesterol   Date Value Ref Range Status   12/27/2017 26 (L) >39 mg/dL Final   ]  GFR Estimate   Date Value Ref Range Status   12/27/2017 >90 >60 mL/min/1.7m2 Final     Comment:     Non  GFR Calc     GFR Estimate If Black   Date Value Ref Range Status   12/27/2017 >90 >60 mL/min/1.7m2 Final     Comment:      GFR Calc     Lab Results   Component Value Date    CR 0.83 12/27/2017     No results found for: MICROALBUMIN    Health Beliefs and Attitudes:   Patient Activation Measure Survey Score:  No flowsheet data found.    Stage of Change: ACTION (Actively working towards change)    Diabetes knowledge and skills assessment:     Patient is knowledgeable in diabetes management concepts related to: Healthy Eating, Being Active and Problem Solving    Patient needs further education on the following diabetes management concepts: Being Active, Monitoring and Taking Medication    Barriers to Learning Assessment: No Barriers identified    Based on learning assessment above, most appropriate setting for further diabetes education would be: Individual setting.    INTERVENTION:    Education provided today on:  AADE Self-Care Behaviors:  Being Active: relationship to blood glucose  Monitoring: reviewed BG pattern  Taking Medication: importance of planning ahead, avoid running out of medication, reinforced adherence, reviewed " when to take insulin (pt sometimes takes Novolog  when not eating)  Problem Solving: reducing risk for hypoglycemia    Opportunities for ongoing education and support in diabetes-self management were discussed.    Pt verbalized understanding of concepts discussed and recommendations provided today.       Education Materials Provided:  No new materials provided today    PLAN:  Work on medication adherence.  Metformin today.     FOLLOW-UP:  Follow-up appointment scheduled on 3/9/18.  Chart routed to referring provider.    Ongoing plan for education and support: Follow-up visit with diabetes educator and Follow-up with primary care provider    June Walker RD, CDE  Diabetes     Time Spent: 30 minutes  Encounter Type: Individual    Any diabetes medication dose changes were made via the CDE Protocol and Collaborative Practice Agreement with the patient's endocrinology provider. A copy of this encounter was shared with the provider.

## 2018-02-09 NOTE — MR AVS SNAPSHOT
After Visit Summary   2/9/2018    Crispin Rojas    MRN: [id number]           Patient Information     Date Of Birth          1960        Visit Information        Provider Department      2/9/2018 9:30 AM June Walker [facility]        Today's Diagnoses     Type 2 diabetes mellitus with peripheral neuropathy (H)    -  1       Follow-ups after your visit        Your next 10 appointments already scheduled     Feb 12, 2018  8:00 AM CST   Return Visit with Valentino Molina DPM   [facility]    [address] [place] [address]   [phone]            Mar 02, 2018  7:00 AM CST   Return Visit with SARAH Raymundo CNP   [facility] ([address] [address]   [phone]            Mar 09, 2018  9:30 AM CST   Diabetic Education with June Walker   [facility] ([address] [address]   [phone]            Mar 20, 2018  8:45 AM CDT   SHORT with Aden Lopez MD   [facility]    [address] [place] [id number] [phone]              Who to contact     If you have questions or need follow up information about today's clinic visit or your schedule please contact [facility] directly at [phone].  Normal or non-critical lab and imaging results will be communicated to you by MyChart, letter or phone within 4 business days after the clinic has received the results. If you do not hear from us within 7 days, please contact the clinic through MyChart or phone. If you have a critical or abnormal lab result, we will notify you by phone as soon as possible.  Submit refill requests through [employer] or call your pharmacy  and they will forward the refill request to us. Please allow 3 business days for your refill to be completed.          Additional Information About Your Visit        [employer] Information     [employer] gives you secure access to your electronic health record. If you see a primary care provider, you can also send messages to your care team and make appointments. If you have questions, please call your primary care clinic.  If you do not have a primary care provider, please call [phone] and they will assist you.        Care EveryWhere ID     This is your Care EveryWhere ID. This could be used by other organizations to access your [facility] medical records  [id number]         Blood Pressure from Last 3 Encounters:   02/02/18 150/70   01/12/18 132/74   12/27/17 138/88    Weight from Last 3 Encounters:   02/02/18 113.6 kg (250 lb 6.4 oz)   01/22/18 113.4 kg (250 lb)   01/12/18 113.4 kg (250 lb)              We Performed the Following     DIABETES EDUCATION - Individual  []        Primary Care Provider Office Phone # Fax #    Aden Lopez [phone] [id number] [facility] [id number]        Equal Access to Services     ELI MARTÍNEZ : Hadii geraldine pisanoo Socas, waaxda luqadaha, qaybta kaalmada adeegyada, blanca rhodes. So [facility] [phone].    ATENCIÓN: Si habla español, tiene a gomze disposición servicios gratuitos de asistencia lingüística. Llame al [phone].    We comply with applicable federal civil rights laws and [place] laws. We do not discriminate on the basis of race, color, national origin, age, disability, sex, sexual orientation, or gender identity.            Thank you!     Thank you for choosing [facility]  for your care. Our goal is always to provide you with excellent care. Hearing back from our patients is one way we can continue to improve our services. Please take a few minutes to complete the  "written survey that you may receive in the mail after your visit with us. Thank you!             Your Updated Medication List - Protect others around you: Learn how to safely use, store and throw away your medicines at www.disposemymeds.org.          This list is accurate as of 2/9/18  4:38 PM.  Always use your most recent med list.                   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 Use one syringe  daily or as directed.    Type 2 diabetes, HbA1C goal < 8% (H)       blood glucose lancets standard    no brand specified    100 each    Use to test blood sugar 3 times daily or as directed.    Type 2 diabetes mellitus with diabetic neuropathy, with long-term current use of insulin (H)       blood glucose monitoring meter device kit    no brand specified    1 kit    Use to test blood sugar 3 times daily or as directed.    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)       CIALIS 5 MG tablet   Generic drug:  tadalafil     30 tablet    TAKE 1 TABLET BY MOUTH EVERY DAY AS NEEDED    Erectile dysfunction due to diseases classified elsewhere       Co-Enzyme Q10 100 MG Caps      Take 100 mg by mouth daily        continuous blood glucose monitoring sensor     3 each    For use with Freestyle Corwin Flash  for continuous monitioring of blood " glucose levels. Replace sensor every 10 days.    Type 2 diabetes mellitus with diabetic peripheral angiopathy and gangrene, with long-term current use of insulin (H)       exenatide ER 2 MG pen    BYDUREON    4 kit    Inject 2 mg Subcutaneous every 7 days    Type 2 diabetes mellitus with peripheral neuropathy (H)       FREESTYLE LUCERO READER Iman     1 Device    1 Device as needed    Type 2 diabetes mellitus with diabetic peripheral angiopathy and gangrene, with long-term current use of insulin (H)       gabapentin 100 MG capsule    NEURONTIN     Take 300 mg by mouth At Bedtime        insulin degludec 100 UNIT/ML pen    TRESIBA    30 mL    Inject 55 Units Subcutaneous daily    Type 2 diabetes mellitus with diabetic peripheral angiopathy and gangrene, with long-term current use of insulin (H)       ketoconazole 2 % shampoo    NIZORAL    120 mL    APPLY TO THE AFFECTED AREA AND WASH OFF AFTER 5 MINUTES.    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)       MULTIVITAMIN PO      Take 1 tablet by mouth daily        * NovoLOG FLEXPEN 100 UNIT/ML injection   Generic drug:  insulin aspart     30 mL    INJECT 10 TO 35 UNITS UNDER THE SKIN THREE TIMES DAILY    Type 2 diabetes mellitus with diabetic peripheral angiopathy and gangrene, with long-term current use of insulin (H)       * NovoLOG FLEXPEN 100 UNIT/ML injection   Generic drug:  insulin aspart     30 mL    INJECT 10 TO 35 UNITS UNDER THE SKIN THREE TIMES DAILY    Type 2 diabetes mellitus with diabetic peripheral angiopathy and gangrene, with long-term current use of insulin (H)       OMEGA-3 FISH OIL PO      Take 2 g by mouth daily        order for DME     1 Device    CAM boot, Large     Type 2 diabetes mellitus with peripheral neuropathy (H), Skin ulcer of right foot with fat layer exposed (H)       Pen Needles 31G X 5 MM Misc     100 each    1 Device 5 times daily , TWICE DAILY WITH LANTUS AND 3 TIMES A DAY PRN WITH NOVOLOG FLEXPEN    Type 2 diabetes mellitus with peripheral neuropathy (H)       sitagliptin 100 MG tablet    JANUVIA    90 tablet    Take 1 tablet (100 mg) by mouth daily    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    Type 2 diabetes, HbA1C goal < 8% (H)       VITAMIN D3 PO      Take 1,000 Units by mouth daily        * Notice:  This list has 2 medication(s) that are the same as other medications prescribed for you. Read the directions carefully, and ask your doctor or other care provider to review them with you.

## 2018-02-09 NOTE — LETTER
"    2/9/2018         RE: Crispin Rojas  [address] [facility] [address]        Dear Colleague,    Thank you for referring your patient, Crispin Rojas, to the [facility]. Please see a copy of my visit note below.    Diabetes Self Management Training: Follow-up Visit    Crispin Rojas presents today for education and evaluation of glucose control related to Type 2 diabetes.    He is accompanied by self    Patient's diabetes management related comments/concerns: is pleased with the improvement in his blood sugars    Patient would like this visit to be focused around the following diabetes-related behaviors and goals: Monitoring and Taking Medication    ASSESSMENT:  Patient Problem List reviewed for relevant medical history and current medical status.    Current Diabetes Management per Patient:    Diabetes Medication(s)     Biguanides Sig    metFORMIN (GLUCOPHAGE-XR) 500 MG 24 hr tablet TAKE 2 TABLETS BY MOUTH TWICE DAILY WITH MEALS    Dipeptidyl Peptidase-4 (DPP-4) Inhibitors Sig    sitagliptin (JANUVIA) 100 MG tablet Take 1 tablet (100 mg) by mouth daily    Insulin Sig    NOVOLOG FLEXPEN 100 UNIT/ML soln INJECT 10 TO 35 UNITS UNDER THE SKIN THREE TIMES DAILY    insulin degludec (TRESIBA) 100 UNIT/ML pen Inject 55 Units Subcutaneous daily    NOVOLOG FLEXPEN 100 UNIT/ML soln INJECT 10 TO 35 UNITS UNDER THE SKIN THREE TIMES DAILY    Incretin Mimetic Agents (GLP-1 Receptor Agonists) Sig    exenatide ER (BYDUREON) 2 MG pen Inject 2 mg Subcutaneous every 7 days        Has not started Bydureon yet- \"has 5 pills of Januvia left\"  Ran out of Metformin last week- plans to  today  Still using up supply of Lantus before switching to Tresiba  Is taking 20-45 units Novolog before meals (base dose 35 units)    BG meter: Contour Next  Meter, has Rx for personal Corwin CGM- has not picked up yet  BG results:       BG values are: improved  Patient's most recent   Lab Results " "  Component Value Date    A1C 9.6 12/27/2017    is not meeting goal of <7.0    Nutrition:  Did not discuss at today's visit    Physical Activity:    Limitations: foot ulcer    Diabetes Complications:  Chronic Complications: neuropathy    Vitals:  There were no vitals taken for this visit.  Estimated body mass index is 35.32 kg/(m^2) as calculated from the following:    Height as of 1/22/18: 1.793 m (5' 10.6\").    Weight as of 2/2/18: 113.6 kg (250 lb 6.4 oz).   Last 3 BP:   BP Readings from Last 3 Encounters:   02/02/18 150/70   01/12/18 132/74   12/27/17 138/88       History   Smoking Status     Never Smoker   Smokeless Tobacco     Never Used       Labs:  Lab Results   Component Value Date    A1C 9.6 12/27/2017     Lab Results   Component Value Date     12/27/2017     Lab Results   Component Value Date    LDL 37 12/27/2017     HDL Cholesterol   Date Value Ref Range Status   12/27/2017 26 (L) >39 mg/dL Final   ]  GFR Estimate   Date Value Ref Range Status   12/27/2017 >90 >60 mL/min/1.7m2 Final     Comment:     Non  GFR Calc     GFR Estimate If Black   Date Value Ref Range Status   12/27/2017 >90 >60 mL/min/1.7m2 Final     Comment:      GFR Calc     Lab Results   Component Value Date    CR 0.83 12/27/2017     No results found for: MICROALBUMIN    Health Beliefs and Attitudes:   Patient Activation Measure Survey Score:  No flowsheet data found.    Stage of Change: ACTION (Actively working towards change)    Diabetes knowledge and skills assessment:     Patient is knowledgeable in diabetes management concepts related to: Healthy Eating, Being Active and Problem Solving    Patient needs further education on the following diabetes management concepts: Being Active, Monitoring and Taking Medication    Barriers to Learning Assessment: No Barriers identified    Based on learning assessment above, most appropriate setting for further diabetes education would be: Individual " setting.    INTERVENTION:    Education provided today on:  AADE Self-Care Behaviors:  Being Active: relationship to blood glucose  Monitoring: reviewed BG pattern  Taking Medication: importance of planning ahead, avoid running out of medication, reinforced adherence, reviewed when to take insulin (pt sometimes takes Novolog  when not eating)  Problem Solving: reducing risk for hypoglycemia    Opportunities for ongoing education and support in diabetes-self management were discussed.    Pt verbalized understanding of concepts discussed and recommendations provided today.       Education Materials Provided:  No new materials provided today    PLAN:  Work on medication adherence.  Metformin today.     FOLLOW-UP:  Follow-up appointment scheduled on 3/9/18.  Chart routed to referring provider.    Ongoing plan for education and support: Follow-up visit with diabetes educator and Follow-up with primary care provider    June Walker RD, ISELA  Diabetes     Time Spent: 30 minutes  Encounter Type: Individual    Any diabetes medication dose changes were made via the CDE Protocol and Collaborative Practice Agreement with the patient's endocrinology provider. A copy of this encounter was shared with the provider.

## 2018-02-12 ENCOUNTER — OFFICE VISIT (OUTPATIENT)
Dept: PODIATRY | Facility: CLINIC | Age: 58
End: 2018-02-12
Payer: COMMERCIAL

## 2018-02-12 VITALS
SYSTOLIC BLOOD PRESSURE: 154 MMHG | HEIGHT: 71 IN | DIASTOLIC BLOOD PRESSURE: 74 MMHG | HEART RATE: 64 BPM | BODY MASS INDEX: 35 KG/M2 | WEIGHT: 250 LBS

## 2018-02-12 DIAGNOSIS — L97.412: ICD-10-CM

## 2018-02-12 DIAGNOSIS — E11.42 TYPE 2 DIABETES MELLITUS WITH PERIPHERAL NEUROPATHY (H): Primary | ICD-10-CM

## 2018-02-12 PROCEDURE — 11042 DBRDMT SUBQ TIS 1ST 20SQCM/<: CPT | Performed by: PODIATRIST

## 2018-02-12 ASSESSMENT — PAIN SCALES - GENERAL: PAINLEVEL: NO PAIN (0)

## 2018-02-12 NOTE — MR AVS SNAPSHOT
After Visit Summary   2/12/2018    Crispin Rojas    MRN: [id number]           Patient Information     Date Of Birth          1960        Visit Information        Provider Department      2/12/2018 8:00 AM Valentino Molina DPM [facility]        Today's Diagnoses     Type 2 diabetes mellitus with peripheral neuropathy (H)    -  1    Ulcer of midfoot, right, with fat layer exposed (H)           Follow-ups after your visit        Your next 10 appointments already scheduled     Mar 02, 2018  7:00 AM CST   Return Visit with SARAH Raymundo CNP   [facility] ([facility])    [address] [place] [id number]   [phone]            Mar 09, 2018  9:30 AM CST   Diabetic Education with June Walker   [facility] ([facility])    [address] [place] [id number]   [phone]            Mar 12, 2018  7:00 AM CDT   Return Visit with Valentino Molina DPM   [facility]    [address] [place] [id number] [phone]            Mar 20, 2018  8:45 AM CDT   SHORT with Aden Lopez MD   [facility] ([facility]    [address] [place] [id number] [phone]              Who to contact     If you have questions or need follow up information about today's clinic visit or your schedule please contact [facility] directly at [phone].  Normal or non-critical lab and imaging results will be communicated to you by MyChart, letter or phone within 4 business days after the clinic has received the results. If you do not hear from us within 7 days, please contact the clinic through MyChart or phone. If you have a critical or abnormal lab result, we will notify you by phone as soon as  "possible.  Submit refill requests through [employer] or call your pharmacy and they will forward the refill request to us. Please allow 3 business days for your refill to be completed.          Additional Information About Your Visit        [employer] Information     [employer] gives you secure access to your electronic health record. If you see a primary care provider, you can also send messages to your care team and make appointments. If you have questions, please call your primary care clinic.  If you do not have a primary care provider, please call [phone] and they will assist you.        Care EveryWhere ID     This is your Care EveryWhere ID. This could be used by other organizations to access your [facility] medical records  [id number]        Your Vitals Were     Pulse Height BMI (Body Mass Index)             64 5' 10.6\" (1.793 m) 35.26 kg/m2          Blood Pressure from Last 3 Encounters:   02/12/18 154/74   02/02/18 150/70   01/12/18 132/74    Weight from Last 3 Encounters:   02/12/18 250 lb (113.4 kg)   02/02/18 250 lb 6.4 oz (113.6 kg)   01/22/18 250 lb (113.4 kg)              We Performed the Following     DEBRIDE SKIN/SUBQ TISSUE        Primary Care Provider Office Phone # Fax #    Aden Lopez [phone] [address] [facility] [id number]        Equal Access to Services     ELI MARTÍNEZ AH: Hadii geraldine ku hadasho Socas, waaxda luqadaha, qaybta kaalmada sabinayada, blanca mroan . So [facility] [phone].    ATENCIÓN: Si habla español, tiene a gomez disposición servicios gratuitos de asistencia lingüística. Michela al [phone].    We comply with applicable federal civil rights laws and [place] laws. We do not discriminate on the basis of race, color, national origin, age, disability, sex, sexual orientation, or gender identity.            Thank you!     Thank you for choosing [facility]  for your care. Our goal is always to " "provide you with excellent care. Hearing back from our patients is one way we can continue to improve our services. Please take a few minutes to complete the written survey that you may receive in the mail after your visit with us. Thank you!             Your Updated Medication List - Protect others around you: Learn how to safely use, store and throw away your medicines at www.disposemymeds.org.          This list is accurate as of 2/12/18  8:24 AM.  Always use your most recent med list.                   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 Use one syringe  daily or as directed.    Type 2 diabetes, HbA1C goal < 8% (H)       blood glucose lancets standard    no brand specified    100 each    Use to test blood sugar 3 times daily or as directed.    Type 2 diabetes mellitus with diabetic neuropathy, with long-term current use of insulin (H)       blood glucose monitoring meter device kit    no brand specified    1 kit    Use to test blood sugar 3 times daily or as directed.    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)       CIALIS 5 MG tablet   Generic drug:  tadalafil     30 tablet    TAKE 1 TABLET BY MOUTH EVERY DAY AS NEEDED    Erectile dysfunction due to diseases classified elsewhere       Co-Enzyme Q10 100 MG Caps      Take 100 mg by " mouth daily        continuous blood glucose monitoring sensor     3 each    For use with Freestyle Corwin Flash  for continuous monitioring of blood glucose levels. Replace sensor every 10 days.    Type 2 diabetes mellitus with diabetic peripheral angiopathy and gangrene, with long-term current use of insulin (H)       exenatide ER 2 MG pen    BYDUREON    4 kit    Inject 2 mg Subcutaneous every 7 days    Type 2 diabetes mellitus with peripheral neuropathy (H)       FREESTYLE CORWIN READER Iman     1 Device    1 Device as needed    Type 2 diabetes mellitus with diabetic peripheral angiopathy and gangrene, with long-term current use of insulin (H)       gabapentin 100 MG capsule    NEURONTIN     Take 300 mg by mouth At Bedtime        insulin degludec 100 UNIT/ML pen    TRESIBA    30 mL    Inject 55 Units Subcutaneous daily    Type 2 diabetes mellitus with diabetic peripheral angiopathy and gangrene, with long-term current use of insulin (H)       ketoconazole 2 % shampoo    NIZORAL    120 mL    APPLY TO THE AFFECTED AREA AND WASH OFF AFTER 5 MINUTES.    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)       MULTIVITAMIN PO      Take 1 tablet by mouth daily        * NovoLOG FLEXPEN 100 UNIT/ML injection   Generic drug:  insulin aspart     30 mL    INJECT 10 TO 35 UNITS UNDER THE SKIN THREE TIMES DAILY    Type 2 diabetes mellitus with diabetic peripheral angiopathy and gangrene, with long-term current use of insulin (H)       * NovoLOG FLEXPEN 100 UNIT/ML injection   Generic drug:  insulin aspart     30 mL    INJECT 10 TO 35 UNITS UNDER THE SKIN THREE TIMES DAILY    Type 2 diabetes mellitus with diabetic peripheral angiopathy and  gangrene, with long-term current use of insulin (H)       OMEGA-3 FISH OIL PO      Take 2 g by mouth daily        Pen Needles 31G X 5 MM Misc     100 each    1 Device 5 times daily , TWICE DAILY WITH LANTUS AND 3 TIMES A DAY PRN WITH NOVOLOG FLEXPEN    Type 2 diabetes mellitus with peripheral neuropathy (H)       thin lancets    NO BRAND SPECIFIED    300 each    1 each 3 times daily.    Type 2 diabetes, HbA1C goal < 8% (H)       VITAMIN D3 PO      Take 1,000 Units by mouth daily        * Notice:  This list has 2 medication(s) that are the same as other medications prescribed for you. Read the directions carefully, and ask your doctor or other care provider to review them with you.

## 2018-02-12 NOTE — PROGRESS NOTES
"ASSESSMENT/PLAN:      Encounter Diagnoses   Name Primary?     Type 2 diabetes mellitus with peripheral neuropathy (H) Yes     Ulcer of midfoot, right, with fat layer exposed (H)      Interval healing of the right foot ulcer. No clinical signs of infection.   No new concerns  He is leaving for [place] today    He is to monitor for redness, drainage, swelling, pain and seek immediate medical attention if his foot is worsening.   Continue current wound cares and offloading.    Follow up in 3-4 weeks.    Excisional debridment procedure discussed.  Goals of removing non-viable tissue, evaluating full extent of wound, and promoting wound healing were explained.  Pt provided verbal and written consent.  A \"Time Out\" was called.     Using a sterile #15 blade and tissue nippers, excisional debridment of the right foot ulcer was performed, full-thickness to the level of, and including, the subcutaneous tissue.  The hyperkeratotic eschar was removed, by excising skin edges back to healthy, bleeding tissue .  Other non-viable tissue was excised. The ulcer base was scraped to remove bioburden and promote healing.  There was moderate bleeding with the procedure. No anesthesia needed due to peripheral neuropathy.  Sterile dressing applied.    Body mass index is 35.26 kg/(m^2).    Weight management plan: Patient was referred to their PCP to discuss a diet and exercise plan.      Valentino Molina DPM, FACFAS, MS    [facility] Pod[facility]ry/Foot & Ankle Surgery      ____________________________________________________________________    HPI:       Chief Complaint: follow up for ulceration, right foot;  Type 2 diabetes with peripheral neuropathy  Onset of problem: 4 months  History well documented in previous visits.  No new concerns.  He uses a CAM walker   At his last visit, he had some new wounds. These have healed.   *  Past Medical History:   Diagnosis Date     Cerebral infarction (H)      Chronic infection      H/O heartburn "      History of heartburn      Hypertension      Numbness and tingling      Obesity      Renal stone      Type II or unspecified type diabetes mellitus without mention of complication, not stated as uncontrolled    *  *  Past Surgical History:   Procedure Laterality Date     AMPUTATE FOOT Left 8/18/2016    Procedure: AMPUTATE FOOT;  Surgeon: Jack Younger DPM;  Location: [facility] OR     AMPUTATE TOE(S) Right 2/1/2016    Procedure: AMPUTATE TOE(S);  Surgeon: Rachelle Manriquez DPM, Pod;  Location: [facility] OR     ANGIOGRAM       COLONOSCOPY       COMBINED CYSTOSCOPY, RETROGRADES, URETEROSCOPY, INSERT STENT Left 8/21/2016    Procedure: COMBINED CYSTOSCOPY, RETROGRADES, URETEROSCOPY, INSERT STENT;  Surgeon: Artemio Valenzuela MD;  Location: [facility] OR     COMBINED CYSTOSCOPY, RETROGRADES, URETEROSCOPY, LASER HOLMIUM LITHOTRIPSY URETER(S), INSERT STENT Left 10/3/2016    Procedure: COMBINED CYSTOSCOPY, RETROGRADES, URETEROSCOPY, LASER HOLMIUM LITHOTRIPSY URETER(S), INSERT STENT;  Surgeon: Artemio Valenzuela MD;  Location: [facility] OR     EXTRACORPOREAL SHOCK WAVE LITHOTRIPSY (ESWL) Left 9/8/2016    Procedure: EXTRACORPOREAL SHOCK WAVE LITHOTRIPSY (ESWL);  Surgeon: Artemio Valenzuela MD;  Location: [facility] OR     RECESSION GASTROCNEMIUS Right 2/1/2016    Procedure: RECESSION GASTROCNEMIUS;  Surgeon: Rachelle Manriquez DPM, Pod;  Location: [facility] OR   *  *  Current Outpatient Prescriptions   Medication Sig Dispense Refill     amLODIPine (NORVASC) 5 MG tablet TAKE 1 TABLET(5 MG) BY MOUTH DAILY 90 tablet 0     exenatide ER (BYDUREON) 2 MG pen Inject 2 mg Subcutaneous every 7 days 4 kit 3     NOVOLOG FLEXPEN 100 UNIT/ML soln INJECT 10 TO 35 UNITS UNDER THE SKIN THREE TIMES DAILY 30 mL 1     insulin degludec (TRESIBA) 100 UNIT/ML pen Inject 55 Units Subcutaneous daily 30 mL 3     continuous blood glucose monitoring (FREESTYLE CORWIN) sensor For use with Freestyle Corwin Flash  for continuous monitioring of blood glucose levels. Replace  "sensor every 10 days. 3 each 3     Continuous Blood Gluc  (FREESTYLE LUCERO READER) LESLY 1 Device as needed 1 Device 1     Insulin Pen Needle (PEN NEEDLES) 31G X 5 MM MISC 1 Device 5 times daily , TWICE DAILY WITH LANTUS AND 3 TIMES A DAY PRN WITH NOVOLOG FLEXPEN 100 each 11     metFORMIN (GLUCOPHAGE-XR) 500 MG 24 hr tablet TAKE 2 TABLETS BY MOUTH TWICE DAILY WITH MEALS 120 tablet 5     ketoconazole (NIZORAL) 2 % shampoo APPLY TO THE AFFECTED AREA AND WASH OFF AFTER 5 MINUTES. 120 mL 3     blood glucose (NO BRAND SPECIFIED) lancets standard Use to test blood sugar 3 times daily or as directed. 100 each 11     atorvastatin (LIPITOR) 40 MG tablet TAKE 1 TABLET BY MOUTH EVERY DAY 90 tablet 1     NOVOLOG FLEXPEN 100 UNIT/ML soln INJECT 10 TO 35 UNITS UNDER THE SKIN THREE TIMES DAILY 30 mL 0     lisinopril (PRINIVIL/ZESTRIL) 20 MG tablet Take 1 tablet (20 mg) by mouth daily 90 tablet 3     gabapentin (NEURONTIN) 100 MG capsule Take 300 mg by mouth At Bedtime       CIALIS 5 MG tablet TAKE 1 TABLET BY MOUTH EVERY DAY AS NEEDED 30 tablet 0     blood glucose monitoring (NO BRAND SPECIFIED) meter device kit Use to test blood sugar 3 times daily or as directed. 1 kit 0     blood glucose monitoring (NO BRAND SPECIFIED) test strip Use to test blood sugars 3 times daily or as directed 300 strip 3     Co-Enzyme Q10 100 MG CAPS Take 100 mg by mouth daily        Cholecalciferol (VITAMIN D3 PO) Take 1,000 Units by mouth daily        Multiple Vitamins-Minerals (MULTIVITAMIN PO) Take 1 tablet by mouth daily        Omega-3 Fatty Acids (OMEGA-3 FISH OIL PO) Take 2 g by mouth daily        lancets thin MISC 1 each 3 times daily. 300 each 3     Insulin Syringe-Needle U-100 (BD INSULIN SYRINGE ULTRAFINE) 30G X 1/2\" 0.3 ML MISC 1 Syringe daily. Use one syringe  daily or as directed. 100 each prn       EXAM:      Constitutional/ general:  Pt is in no apparent distress, appears well-nourished.  Cooperative with history and physical exam. "       Vascular:  Pedal pulses are palpable bilaterally for both the DP and PT arteries.  CFT < 3 sec.  No edema.  Pedal hair growth noted.       Neuro:  Alert and oriented x 3. Coordinated gait.  Light touch sensation is diminished to the L4, L5, S1 distributions.  No evidence of neurological-based weakness, spasticity, or contracture in the lower extremities.       Derm:    1)  Full thickness ulceration to the level of the subcutaneous fat, sub right 1st metatarsal head.  Base is granular.  Less hyperkeratotic eschar around the edges.  No purulence. The wound does not probe to bone.  1.0cm  X 0.8cm oval, x 0.1cm deep.        Musculoskeletal:    Lower extremity muscle strength is normal.  Patient is ambulatory without an assistive device or brace .  No gross deformities.  There is b/l limited ankle dorsiflexion with knee extended.   ROM is increased with flexion of the knee b/l.  Digital contractures.       Valentino Molina DPM, FACFAS, MS    [facility] of Podiatry/Foot & Ankle Surgery

## 2018-02-12 NOTE — NURSING NOTE
"Chief Complaint   Patient presents with     RECHECK     right foot       Initial Pulse 64  Ht 5' 10.6\" (1.793 m)  Wt 250 lb (113.4 kg)  BMI 35.26 kg/m2 Estimated body mass index is 35.26 kg/(m^2) as calculated from the following:    Height as of this encounter: 5' 10.6\" (1.793 m).    Weight as of this encounter: 250 lb (113.4 kg).  Medication Reconciliation: complete    "

## 2018-02-12 NOTE — LETTER
"    2/12/2018         RE: Crispin Rojas  [address] [facility] [id number]        Dear Colleague,    Thank you for referring your patient, Crispin Rojas, to the [facility]. Please see a copy of my visit note below.    ASSESSMENT/PLAN:      Encounter Diagnoses   Name Primary?     Type 2 diabetes mellitus with peripheral neuropathy (H) Yes     Ulcer of midfoot, right, with fat layer exposed (H)      Interval healing of the right foot ulcer. No clinical signs of infection.   No new concerns  He is leaving for [place] today    He is to monitor for redness, drainage, swelling, pain and seek immediate medical attention if his foot is worsening.   Continue current wound cares and offloading.    Follow up in 3-4 weeks.    Excisional debridment procedure discussed.  Goals of removing non-viable tissue, evaluating full extent of wound, and promoting wound healing were explained.  Pt provided verbal and written consent.  A \"Time Out\" was called.     Using a sterile #15 blade and tissue nippers, excisional debridment of the right foot ulcer was performed, full-thickness to the level of, and including, the subcutaneous tissue.  The hyperkeratotic eschar was removed, by excising skin edges back to healthy, bleeding tissue .  Other non-viable tissue was excised. The ulcer base was scraped to remove bioburden and promote healing.  There was moderate bleeding with the procedure. No anesthesia needed due to peripheral neuropathy.  Sterile dressing applied.    Body mass index is 35.26 kg/(m^2).    Weight management plan: Patient was referred to their PCP to discuss a diet and exercise plan.      Valentino Molina, SHRUTHI, FACFAS, MS    [facility] of Podiatry/Foot & Ankle Surgery      ____________________________________________________________________    HPI:       Chief Complaint: follow up for ulceration, right foot;  Type 2 diabetes with peripheral neuropathy  Onset of problem: 4 " months  History well documented in previous visits.  No new concerns.  He uses a CAM walker   At his last visit, he had some new wounds. These have healed.   *  Past Medical History:   Diagnosis Date     Cerebral infarction (H)      Chronic infection      H/O heartburn      History of heartburn      Hypertension      Numbness and tingling      Obesity      Renal stone      Type II or unspecified type diabetes mellitus without mention of complication, not stated as uncontrolled    *  *  Past Surgical History:   Procedure Laterality Date     AMPUTATE FOOT Left 8/18/2016    Procedure: AMPUTATE FOOT;  Surgeon: Jack Younger DPM;  Location: [facility]     AMPUTATE TOE(S) Right 2/1/2016    Procedure: AMPUTATE TOE(S);  Surgeon: Rachelle Manriquez DPM, Pod;  Location: [facility] OR     ANGIOGRAM       COLONOSCOPY       COMBINED CYSTOSCOPY, RETROGRADES, URETEROSCOPY, INSERT STENT Left 8/21/2016    Procedure: COMBINED CYSTOSCOPY, RETROGRADES, URETEROSCOPY, INSERT STENT;  Surgeon: Artemio Valenzuela MD;  Location: [facility]     COMBINED CYSTOSCOPY, RETROGRADES, URETEROSCOPY, LASER HOLMIUM LITHOTRIPSY URETER(S), INSERT STENT Left 10/3/2016    Procedure: COMBINED CYSTOSCOPY, RETROGRADES, URETEROSCOPY, LASER HOLMIUM LITHOTRIPSY URETER(S), INSERT STENT;  Surgeon: Artemio Valenzuela MD;  Location: [facility] OR     EXTRACORPOREAL SHOCK WAVE LITHOTRIPSY (ESWL) Left 9/8/2016    Procedure: EXTRACORPOREAL SHOCK WAVE LITHOTRIPSY (ESWL);  Surgeon: Artemio Valenzuela MD;  Location: [facility] OR     RECESSION GASTROCNEMIUS Right 2/1/2016    Procedure: RECESSION GASTROCNEMIUS;  Surgeon: Rachelle Manriquez DPM, Pod;  Location: [facility] OR   *  *  Current Outpatient Prescriptions   Medication Sig Dispense Refill     amLODIPine (NORVASC) 5 MG tablet TAKE 1 TABLET(5 MG) BY MOUTH DAILY 90 tablet 0     exenatide ER (BYDUREON) 2 MG pen Inject 2 mg Subcutaneous every 7 days 4 kit 3     NOVOLOG FLEXPEN 100 UNIT/ML soln INJECT 10 TO 35 UNITS UNDER THE SKIN THREE TIMES  DAILY 30 mL 1     insulin degludec (TRESIBA) 100 UNIT/ML pen Inject 55 Units Subcutaneous daily 30 mL 3     continuous blood glucose monitoring (FREESTYLE LUCERO) sensor For use with Freestyle Lucero Flash  for continuous monitioring of blood glucose levels. Replace sensor every 10 days. 3 each 3     Continuous Blood Gluc  (FREESTYLE LUCERO READER) LESLY 1 Device as needed 1 Device 1     Insulin Pen Needle (PEN NEEDLES) 31G X 5 MM MISC 1 Device 5 times daily , TWICE DAILY WITH LANTUS AND 3 TIMES A DAY PRN WITH NOVOLOG FLEXPEN 100 each 11     metFORMIN (GLUCOPHAGE-XR) 500 MG 24 hr tablet TAKE 2 TABLETS BY MOUTH TWICE DAILY WITH MEALS 120 tablet 5     ketoconazole (NIZORAL) 2 % shampoo APPLY TO THE AFFECTED AREA AND WASH OFF AFTER 5 MINUTES. 120 mL 3     blood glucose (NO BRAND SPECIFIED) lancets standard Use to test blood sugar 3 times daily or as directed. 100 each 11     atorvastatin (LIPITOR) 40 MG tablet TAKE 1 TABLET BY MOUTH EVERY DAY 90 tablet 1     NOVOLOG FLEXPEN 100 UNIT/ML soln INJECT 10 TO 35 UNITS UNDER THE SKIN THREE TIMES DAILY 30 mL 0     lisinopril (PRINIVIL/ZESTRIL) 20 MG tablet Take 1 tablet (20 mg) by mouth daily 90 tablet 3     gabapentin (NEURONTIN) 100 MG capsule Take 300 mg by mouth At Bedtime       CIALIS 5 MG tablet TAKE 1 TABLET BY MOUTH EVERY DAY AS NEEDED 30 tablet 0     blood glucose monitoring (NO BRAND SPECIFIED) meter device kit Use to test blood sugar 3 times daily or as directed. 1 kit 0     blood glucose monitoring (NO BRAND SPECIFIED) test strip Use to test blood sugars 3 times daily or as directed 300 strip 3     Co-Enzyme Q10 100 MG CAPS Take 100 mg by mouth daily        Cholecalciferol (VITAMIN D3 PO) Take 1,000 Units by mouth daily        Multiple Vitamins-Minerals (MULTIVITAMIN PO) Take 1 tablet by mouth daily        Omega-3 Fatty Acids (OMEGA-3 FISH OIL PO) Take 2 g by mouth daily        lancets thin MISC 1 each 3 times daily. 300 each 3     Insulin  "Syringe-Needle U-100 (BD INSULIN SYRINGE ULTRAFINE) 30G X 1/2\" 0.3 ML MISC 1 Syringe daily. Use one syringe  daily or as directed. 100 each prn       EXAM:      Constitutional/ general:  Pt is in no apparent distress, appears well-nourished.  Cooperative with history and physical exam.       Vascular:  Pedal pulses are palpable bilaterally for both the DP and PT arteries.  CFT < 3 sec.  No edema.  Pedal hair growth noted.       Neuro:  Alert and oriented x 3. Coordinated gait.  Light touch sensation is diminished to the L4, L5, S1 distributions.  No evidence of neurological-based weakness, spasticity, or contracture in the lower extremities.       Derm:    1)  Full thickness ulceration to the level of the subcutaneous fat, sub right 1st metatarsal head.  Base is granular.  Less hyperkeratotic eschar around the edges.  No purulence. The wound does not probe to bone.  1.0cm  X 0.8cm oval, x 0.1cm deep.        Musculoskeletal:    Lower extremity muscle strength is normal.  Patient is ambulatory without an assistive device or brace .  No gross deformities.  There is b/l limited ankle dorsiflexion with knee extended.   ROM is increased with flexion of the knee b/l.  Digital contractures.       Valentino Molina DPM, FACFAS, MS    [facility] of Podiatry/Foot & Ankle Surgery      Again, thank you for allowing me to participate in the care of your patient.        Sincerely,        Valentino Molina DPM    "

## 2018-02-16 DIAGNOSIS — N52.1 ERECTILE DYSFUNCTION DUE TO DISEASES CLASSIFIED ELSEWHERE: ICD-10-CM

## 2018-02-18 NOTE — TELEPHONE ENCOUNTER
"Requested Prescriptions   Pending Prescriptions Disp Refills     CIALIS 5 MG tablet [Pharmacy Med Name: CIALIS 5MG TABLETS]  Last Written Prescription Date:  4/7/2017  Last Fill Quantity: 30 tablet,  # refills: 0   Last office visit: 12/27/2017 with prescribing provider:  Jessica     Future Office Visit:   Next 5 appointments (look out 90 days)     Mar 02, 2018  7:00 AM CST   Return Visit with SARAH Raymundo CNP   [facility]    [address] [place] [id number]   [phone]            Mar 12, 2018  7:00 AM CDT   [facility] Visit with Valentino [facility]ina DPM   [facility]    [address] [place] [id number] [phone]            Mar 20, 2018  8:45 AM CDT   SHORT with Aden Lopez MD   [facility]    [address] [place] [id number]   [phone]               30 tablet 0     Sig: TAKE 1 TABLET BY MOUTH EVERY DAY AS NEEDED    Erectile Dysfuction Protocol Failed    2/16/2018  5:50 PM       Failed - Absence of nitrates on medication list       Passed - Absence of Alpha Blockers on Med list       Passed - Recent or future visit with authorizing provider    Patient had office visit in the last year or has a visit in the next 30 days with authorizing provider.  See \"Patient Info\" tab in inbasket, or \"Choose Columns\" in Meds & Orders section of the refill encounter.        Passed - Patient is age 18 or older          "

## 2018-02-20 DIAGNOSIS — N52.1 ERECTILE DYSFUNCTION DUE TO DISEASES CLASSIFIED ELSEWHERE: ICD-10-CM

## 2018-02-20 RX ORDER — TADALAFIL 5 MG
TABLET ORAL
Qty: 30 TABLET | Refills: 0 | Status: SHIPPED | OUTPATIENT
Start: 2018-02-20 | End: 2020-01-28

## 2018-02-20 NOTE — TELEPHONE ENCOUNTER
Prescription approved per [facility] Refill Protocol.  June Thomas RN, BSN

## 2018-02-21 RX ORDER — TADALAFIL 5 MG
TABLET ORAL
Qty: 30 TABLET | Refills: 0 | OUTPATIENT
Start: 2018-02-21

## 2018-02-21 NOTE — TELEPHONE ENCOUNTER
Duplicate. Refill was sent on 2/20/18.   Denial sent with note to pharmacy requesting they review and fill.     Julissa Escalante RN -- [facility] [employer]

## 2018-02-22 DIAGNOSIS — N52.1 ERECTILE DYSFUNCTION DUE TO DISEASES CLASSIFIED ELSEWHERE: ICD-10-CM

## 2018-02-26 RX ORDER — TADALAFIL 5 MG
TABLET ORAL
Qty: 30 TABLET | Refills: 0
Start: 2018-02-26 | End: 2019-04-29

## 2018-03-02 ENCOUNTER — OFFICE VISIT (OUTPATIENT)
Dept: ENDOCRINOLOGY | Facility: CLINIC | Age: 58
End: 2018-03-02
Payer: COMMERCIAL

## 2018-03-02 VITALS
HEART RATE: 64 BPM | SYSTOLIC BLOOD PRESSURE: 132 MMHG | WEIGHT: 252.8 LBS | DIASTOLIC BLOOD PRESSURE: 80 MMHG | TEMPERATURE: 97.9 F | BODY MASS INDEX: 35.66 KG/M2 | OXYGEN SATURATION: 97 %

## 2018-03-02 DIAGNOSIS — Z79.4 TYPE 2 DIABETES MELLITUS WITH DIABETIC NEUROPATHY, WITH LONG-TERM CURRENT USE OF INSULIN (H): ICD-10-CM

## 2018-03-02 DIAGNOSIS — E11.49 DIABETES MELLITUS TYPE 2 WITH NEUROLOGICAL MANIFESTATIONS (H): ICD-10-CM

## 2018-03-02 DIAGNOSIS — Z79.4 TYPE 2 DIABETES MELLITUS WITH DIABETIC PERIPHERAL ANGIOPATHY AND GANGRENE, WITH LONG-TERM CURRENT USE OF INSULIN (H): ICD-10-CM

## 2018-03-02 DIAGNOSIS — E11.40 TYPE 2 DIABETES MELLITUS WITH DIABETIC NEUROPATHY, WITH LONG-TERM CURRENT USE OF INSULIN (H): ICD-10-CM

## 2018-03-02 DIAGNOSIS — E11.42 TYPE 2 DIABETES MELLITUS WITH PERIPHERAL NEUROPATHY (H): Primary | ICD-10-CM

## 2018-03-02 DIAGNOSIS — E11.52 TYPE 2 DIABETES MELLITUS WITH DIABETIC PERIPHERAL ANGIOPATHY AND GANGRENE, WITH LONG-TERM CURRENT USE OF INSULIN (H): ICD-10-CM

## 2018-03-02 PROCEDURE — 99214 OFFICE O/P EST MOD 30 MIN: CPT | Performed by: CLINICAL NURSE SPECIALIST

## 2018-03-02 RX ORDER — FLASH GLUCOSE SENSOR
KIT MISCELLANEOUS
Qty: 3 EACH | Refills: 3 | Status: SHIPPED | OUTPATIENT
Start: 2018-03-02 | End: 2018-10-11

## 2018-03-02 RX ORDER — METFORMIN HCL 500 MG
TABLET, EXTENDED RELEASE 24 HR ORAL
Qty: 360 TABLET | Refills: 1 | Status: SHIPPED | OUTPATIENT
Start: 2018-03-02 | End: 2018-09-09

## 2018-03-02 RX ORDER — PEN NEEDLE, DIABETIC 30 GX3/16"
1 NEEDLE, DISPOSABLE MISCELLANEOUS
Qty: 200 EACH | Refills: 11 | Status: SHIPPED | OUTPATIENT
Start: 2018-03-02 | End: 2019-03-29

## 2018-03-02 NOTE — MR AVS SNAPSHOT
After Visit Summary   3/2/2018    Crispin Rojas    MRN: [id number]           Patient Information     Date Of Birth          1960        Visit Information        Provider Department      3/2/2018 7:00 AM Jasmin Artaega APRN CNP [facility]        Today's Diagnoses     Type 2 diabetes mellitus with peripheral neuropathy (H)    -  1    Type 2 diabetes mellitus with diabetic peripheral angiopathy and gangrene, with long-term current use of insulin (H)        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)        Type 2 diabetes mellitus with diabetic neuropathy, with long-term current use of insulin (H)           Follow-ups after your visit        Follow-up notes from your care team     Return in about 3 months (around 6/2/2018).      Your next 10 appointments already scheduled     Mar 09, 2018  9:30 AM CST   Diabetic Education with June Walker   [facility] ([facility])    [address] [place] [phone]   [phone]            Mar 12, 2018  7:00 AM CDT   Return Visit with Valentino Molina DPM   [facility]    [address] [place] [address]   [phone]            Mar 20, 2018  8:45 AM CDT   SHORT with Aden Lopez MD   [facility] ([facility])    [address] [place] [phone]   [phone]            Jun 22, 2018  8:15 AM CDT   LAB with CR LAB   [facility] ([facility])    [address] [place] [id number]   [phone]           Please do not eat 10-12 hours before your appointment if you are coming in fasting for labs on lipids, cholesterol, or glucose (sugar). This does not apply to pregnant women. Water, hot tea and black coffee (with nothing added) are okay. Do not drink other  fluids, diet soda or chew gum.            Jun 22, 2018  8:30 AM CDT   Return Visit with SARAH Raymundo CNP   [facility] ([facility])    [address]  [facility] [address] [phone]              Future tests that were ordered for you today     Open Future Orders        Priority Expected Expires Ordered    Hemoglobin A1c Routine 6/22/2018 3/2/2019 3/2/2018    JUST IN CASE Routine 6/22/2018 3/2/2019 3/2/2018            Who to contact     If you have questions or need follow up information about today's clinic visit or your schedule please contact [facility] directly at [phone].  Normal or non-critical lab and imaging results will be communicated to you by Tivixhart, letter or phone within 4 business days after the clinic has received the results. If you do not hear from us within 7 days, please contact the clinic through Tivixhart or phone. If you have a critical or abnormal lab result, we will notify you by phone as soon as possible.  Submit refill requests through [employer] or call your pharmacy and they will forward the refill request to us. Please allow 3 business days for your refill to be completed.          Additional Information About Your Visit        [employer] Information     [employer] gives you secure access to your electronic health record. If you see a primary care provider, you can also send messages to your care team and make appointments. If you have questions, please call your primary care clinic.  If you do not have a primary care provider, please call [phone] and they will assist you.        Care EveryWhere ID     This is your Care EveryWhere ID. This could be used by other organizations to access your [place] medical records  [id number]        Your Vitals Were     Pulse Temperature Pulse Oximetry BMI (Body Mass Index)          64 97.9  F (36.6  C) (Oral) 97% 35.66 kg/m2         Blood Pressure from Last 3  Encounters:   03/02/18 132/80   02/12/18 154/74   02/02/18 150/70    Weight from Last 3 Encounters:   03/02/18 114.7 kg (252 lb 12.8 oz)   02/12/18 113.4 kg (250 lb)   02/02/18 113.6 kg (250 lb 6.4 oz)                 Today's Medication Changes          These changes are accurate as of 3/2/18  8:44 AM.  If you have any questions, ask your nurse or doctor.               These medicines have changed or have updated prescriptions.        Dose/Directions    insulin aspart 100 UNIT/ML injection   Commonly known as:  NovoLOG FLEXPEN   This may have changed:  See the new instructions.   Used for:  Type 2 diabetes mellitus with diabetic peripheral angiopathy and gangrene, with long-term current use of insulin (H)   Changed by:  Jasmin Arteaga APRN CNP        INJECT 10 TO 35 UNITS UNDER THE SKIN THREE TIMES DAILY   Quantity:  30 mL   Refills:  3       metFORMIN 500 MG 24 hr tablet   Commonly known as:  GLUCOPHAGE-XR   This may have changed:  See the new instructions.   Used for:  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)   Changed by:  Jasmin Arteaga APRN CNP        TAKE 2 TABLETS BY MOUTH TWICE DAILY WITH MEALS   Quantity:  360 tablet   Refills:  1            Where to get your medicines      These medications were sent to [facility] [address] [place] [address] AT [facility] [address]  [address] [place] [address], [facility] [address]     Phone:  [phone]     blood glucose lancets standard    blood glucose monitoring test strip    continuous blood glucose monitoring sensor    exenatide ER 2 MG pen    insulin aspart 100 UNIT/ML injection    insulin degludec 100 UNIT/ML pen    metFORMIN 500 MG 24 hr tablet    Pen Needles 31G X 5 MM Misc                Primary Care Provider Office Phone # Fax #    Aden Lopez [phone] [address] [facility] [address]        Equal Access to Services     ELI MARTÍNEZ AH: Nehemias Kan  "gio morganrachel, daisyybblanca koromaarya meagan. So [facility] [phone].    ATENCIÓN: Si sony poe, tiene a gomez disposición servicios gratuitos de asistencia lingüística. Michela al [phone].    We comply with applicable federal civil rights laws and [place] laws. We do not discriminate on the basis of race, color, national origin, age, disability, sex, sexual orientation, or gender identity.            Thank you!     Thank you for choosing [facility]  for your care. Our goal is always to provide you with excellent care. Hearing back from our patients is one way we can continue to improve our services. Please take a few minutes to complete the written survey that you may receive in the mail after your visit with us. Thank you!             Your Updated Medication List - Protect others around you: Learn how to safely use, store and throw away your medicines at www.disposemymeds.org.          This list is accurate as of 3/2/18  8:44 AM.  Always use your most recent med list.                   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 Use one syringe  daily or as directed.    Type 2 diabetes, HbA1C goal < 8% (H)       blood glucose lancets standard    no brand specified    300 each    Use to test blood sugar 3 times daily or as directed.    Type 2 diabetes mellitus with diabetic neuropathy, with long-term current use of insulin (H)       blood glucose monitoring meter device kit    no brand specified    " 1 kit    Use to test blood sugar 3 times daily or as directed.    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 Replace sensor every 10 days.    Type 2 diabetes mellitus with diabetic peripheral angiopathy and gangrene, with long-term current use of insulin (H)       exenatide ER 2 MG pen    BYDUREON    4 kit    Inject 2 mg Subcutaneous every 7 days    Type 2 diabetes mellitus with peripheral neuropathy (H)       FREESTYLE CORWIN READER Iman     1 Device    1 Device as needed    Type 2 diabetes mellitus with diabetic peripheral angiopathy and gangrene, with long-term current use of insulin (H)       gabapentin 100 MG capsule    NEURONTIN     Take 300 mg by mouth At Bedtime        insulin aspart 100 UNIT/ML injection    NovoLOG FLEXPEN    30 mL    INJECT 10 TO 35 UNITS UNDER THE SKIN THREE TIMES DAILY    Type 2 diabetes mellitus with diabetic peripheral angiopathy and gangrene, with long-term current use of insulin (H)       insulin degludec 100 UNIT/ML pen    TRESIBA    30 mL    Inject 55 Units Subcutaneous daily    Type 2 diabetes mellitus with diabetic peripheral angiopathy and gangrene, with long-term current use of insulin (H)        ketoconazole 2 % shampoo    NIZORAL    120 mL    APPLY TO THE AFFECTED AREA AND WASH OFF AFTER 5 MINUTES.    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)       MULTIVITAMIN PO      Take 1 tablet by mouth daily        OMEGA-3 FISH OIL PO      Take 2 g by mouth daily        Pen Needles 31G X 5 MM Misc     200 each    1 Device 5 times daily , TWICE DAILY WITH LANTUS AND 3 TIMES A DAY PRN WITH NOVOLOG FLEXPEN    Type 2 diabetes mellitus with peripheral neuropathy (H)       thin lancets    NO BRAND SPECIFIED    300 each    1 each 3 times daily.    Type 2 diabetes, HbA1C goal < 8% (H)       VITAMIN D3 PO      Take 1,000 Units by mouth daily        * Notice:  This list has 2 medication(s) that are the same as other medications prescribed for you. Read the directions carefully, and ask your doctor or other care provider to review them with you.

## 2018-03-02 NOTE — PROGRESS NOTES
Name: Crispin Rojas  Seen today for Diabetes (Last seen 2/20/2018).  HPI:  Crispin Rojas is a 57 year old male who presents for the evaluation/management of:    1. Type 2 DM:  Originally diagnosed at the age of: 1997.  Insulin treated 15 years.  Current Regimen: Metformin 1000 mg bid, Bydureon 2 mg weekly, Tresiba 55 units qd, Novolog 35 units (full meal) or 17 units for a snack tid + 2:50>150.  Started Bydureon about 1 month ago - tolerating well.  He feels the Bydureon has helped improve his blood sugars significantly.  Using personal CGM - noting BG spikes after meals but eventually decreases back to target range.  Only eats 1-2 meals per day, still takes Novolog tid - lower dose if eating a snack instead of a full meal, correction dose only if not eating.    BS checks:   Meter Download: average  (n23).  Blood sugar range .  Corwin CGM download: average .  64%     Right foot ulcer developed in September, blood sugars have been high since that time   History of foot ulcers - Avid golfer, gets blisters from golf shoes, + history of osteomyelitis    Complications:   Diabetes Complications  Description / Detail    Diabetic Retinopathy   + diabetic retinopathy, last eye exam 4/2017, next appointment 3/2/2018   CAD / PAD  No   Neuropathy   Yes, sees Dr. Molina   Nephropathy / Microalbuminuria  Yes, elevated microalbumin   Gastroparesis  No   Hypoglycemia Unawareness  No       2. Hypertension: Blood Pressure today:   BP Readings from Last 3 Encounters:   03/02/18 132/80   02/12/18 154/74   02/02/18 150/70   .  Blood pressure medications include Amlodipine 5 mg qd, lisinopril 20 mg qd.  Takes medications everyday without forgetting a dose.  Denies feeling lightheaded or dizzy.  3. Hyperlipidemia: Takes Atorvastatin 40 mg qd for lipid control.  Denies muscle aches of pains.       4. Prevention:  Flu Shot-   Pneumovax- 11/2015  Opthalmology-annaully  Dental-recommend semiannually  ASA-  Smoking-    PMH/PSH:  Past Medical History:   Diagnosis Date     Cerebral infarction (H)      Chronic infection      H/O heartburn      History of heartburn      Hypertension      Numbness and tingling      Obesity      Renal stone      Type II or unspecified type diabetes mellitus without mention of complication, not stated as uncontrolled      Past Surgical History:   Procedure Laterality Date     AMPUTATE FOOT Left 8/18/2016    Procedure: AMPUTATE FOOT;  Surgeon: Jack Younger DPM;  Location: [facility] OR     AMPUTATE TOE(S) Right 2/1/2016    Procedure: AMPUTATE TOE(S);  Surgeon: Rachelle Manriquez DPM, Pod;  Location: [facility] OR     ANGIOGRAM       COLONOSCOPY       COMBINED CYSTOSCOPY, RETROGRADES, URETEROSCOPY, INSERT STENT Left 8/21/2016    Procedure: COMBINED CYSTOSCOPY, RETROGRADES, URETEROSCOPY, INSERT STENT;  Surgeon: Artemio Valenzuela MD;  Location: [facility] OR     COMBINED CYSTOSCOPY, RETROGRADES, URETEROSCOPY, LASER HOLMIUM LITHOTRIPSY URETER(S), INSERT STENT Left 10/3/2016    Procedure: COMBINED CYSTOSCOPY, RETROGRADES, URETEROSCOPY, LASER HOLMIUM LITHOTRIPSY URETER(S), INSERT STENT;  Surgeon: Artemio Valenzuela MD;  Location: [facility] OR     EXTRACORPOREAL SHOCK WAVE LITHOTRIPSY (ESWL) Left 9/8/2016    Procedure: EXTRACORPOREAL SHOCK WAVE LITHOTRIPSY (ESWL);  Surgeon: Artemio Valenzuela MD;  Location: [facility] OR     RECESSION GASTROCNEMIUS Right 2/1/2016    Procedure: RECESSION GASTROCNEMIUS;  Surgeon: Rachelle Manriquez DPM, Pod;  Location: [facility] OR     Family Hx:  Family History   Problem Relation Age of Onset     Arthritis Mother      SLE     Connective Tissue Disorder Mother      lupus     DIABETES Mother      CEREBROVASCULAR DISEASE Mother      CANCER Mother      DIABETES Father      DIABETES Maternal Grandfather      DIABETES Sister      Thyroid disease:          DM2: Yes: Strong family history on mothers side - mom, 7 maternal uncles, two maternal aunts, + MGF.         Autoimmune: DM1, SLE, RA, Vitiligo Yes:  Lupus    Social Hx:  Social History     Social History     Marital status:      Spouse name: Sydney     Number of children: 2     Years of education: N/A     Occupational History     marketing [employer]     Social History Main Topics     Smoking status: Never Smoker     Smokeless tobacco: Never Used     Alcohol use 0.0 oz/week     0 Standard drinks or equivalent per week      Comment: rare---red wine 2x per week     Drug use: No     Sexual activity: Yes     Partners: Female     Other Topics Concern     Parent/Sibling W/ Cabg, Mi Or Angioplasty Before 65f 55m? No     Social History Narrative          MEDICATIONS:  has a current medication list which includes the following prescription(s): exenatide er, insulin aspart, insulin degludec, continuous blood glucose monitoring, metformin, blood glucose, blood glucose monitoring, pen needles, cialis, cialis, amlodipine, freestyle lilly reader, ketoconazole, atorvastatin, lisinopril, gabapentin, blood glucose monitoring, co-enzyme q10, cholecalciferol, multiple vitamins-minerals, omega-3 fatty acids, thin, and bd insulin syringe ultrafine.    ROS     ROS: 10 point ROS neg other than the symptoms noted above in the HPI.    Physical Exam   VS: /80 (BP Location: Left arm, Patient Position: Chair, Cuff Size: Adult Large)  Pulse 64  Temp 97.9  F (36.6  C) (Oral)  Wt 114.7 kg (252 lb 12.8 oz)  SpO2 97%  BMI 35.66 kg/m2  GENERAL: NAD, well dressed, answering questions appropriately, appears stated age.  HEENT: OP clear, no exopthalmous, no proptosis, EOMI, no lig lag, no retraction, no scleral icterus  RESPIRATORY: Normal respiratory effort.  CARDIOVASCULAR: No peripheral edema.  EXTREMITIES: no edema, +pulses, no rashes, no lesions  NEUROLOGY: CN grossly intact, no tremors  MSK: grossly intact, No digital cyanosis. Normal gait and station.  SKIN: no rashes, no lesions, no ulcers  PSYCH: Intact judgment and insight. A&OX3 with a cordial  affect.    LABS:  A1c:   Component      Latest Ref Rng & Units 8/30/2016 12/5/2016 3/31/2017 12/27/2017   Hemoglobin A1C      4.3 - 6.0 % 6.7 (H) 7.3 (H) 7.5 (H) 9.6 (H)     Basic Metabolic Panel:  !COMPREHENSIVE Latest Ref Rng & Units 12/27/2017   SODIUM 133 - 144 mmol/L 139   POTASSIUM 3.4 - 5.3 mmol/L 4.4   CHLORIDE 94 - 109 mmol/L 105   CO2 mmol/L    BUN 7 - 30 mg/dL 20   Creatinine 0.66 - 1.25 mg/dL    Creatinine 0.66 - 1.25 mg/dL 0.83   Glucose 70 - 99 mg/dL 255 (H)   ANION GAP 3 - 14 mmol/L 8   CALCIUM 8.5 - 10.1 mg/dL 8.3 (L)     LFTS/Cholesterol Panel:  !LIPID/HEPATIC Latest Ref Rng & Units 12/27/2017   CHOLESTEROL <200 mg/dL 96   TRIGLYCERIDES <150 mg/dL 167 (H)   HDL CHOLESTEROL >39 mg/dL 26 (L)   LDL CHOLESTEROL DIRECT 0 - 129 mg/dL    LDL CHOLESTEROL, CALCULATED <100 mg/dL 37   VLDL-CHOLESTEROL 0 - 30 mg/dL    NON HDL CHOLESTEROL <130 mg/dL 70   CHOLESTEROL/HDL RATIO 0.0 - 5.0    AST 0 - 45 U/L 40   ALT 0 - 70 U/L 53     Thyroid Function:   !THYROID Latest Ref Rng & Units 12/27/2017   TSH 0.40 - 4.00 mU/L 1.78     Urine MicroAlbumin:  Component      Latest Ref Rng & Units 12/27/2017   Creatinine Urine      mg/dL 95   Albumin Urine mg/L      mg/L 23   Albumin Urine mg/g Cr      0 - 17 mg/g Cr 24.55 (H)     Vitamin D:    All pertinent notes, labs, and images personally reviewed by me.     A/P  Mr.Patrick AKASH Rojas is a 57 year old here for the evaluation/management of diabetes:    1. DM2 - Uncontrolled.  Recommend he take Novolog 15 minutes before eating - this should help keep BG from spiking so high after meals.  Continue Metformin 1000 mg bid, Bydureon 2 mg weekly, Tresiba 55 units qd, and Novolog tid before meals + 2:50>150.  Keep detailed food and insulin dose records prior to diabetes ed follow up.  He would benefit from learning/reviewing carb counting and dosing Novolog using a I:C ratio - states he previously carb counted but it's been awhile.  He can work with ISELA Palmer, on carb counting  and Novolog I:C ratio.  CDE follow-up: 2/9/18  Too early to repeat A1c, can recheck A1c the end of March.  There is some variability among people, most will usually develop symptoms suggestive of hypoglycemia when blood glucose levels are lowered to the mid 60's. The first set of symptoms are called adrenergic. Patients may experience any of the following nervousness, sweating, intense hunger, trembling, weakness, palpitations, and difficulty speaking.   The acute management of hypoglycemia involves the rapid delivery of a source of easily absorbed sugar. Regular soda, juice, lifesavers, table sugar, are good options. 15 grams of glucose is the dose that is given, followed by an assessment of symptoms and a blood glucose check if possible. If after 10 minutes there is no improvement, another 10-15 grams should be given. This can be repeated up to three times. The equivalency of 10-15 grams of glucose (approximate servings) are: 3-5 hard candies, 3 teaspoons of sugar, or 1/2 cup of regular soda or juice.      2. Hypertension - Controlled.    3. Hyperlipidemia - On statin therapy    Labs ordered today:   Orders Placed This Encounter   Procedures     Hemoglobin A1c     JUST IN CASE       Radiology/Consults ordered today: None    All questions were answered.  The patient indicates understanding of the above issues and agrees with the plan set forth.  Total face to face time greater than or equal to 25 minutes.       Follow-up:  3 months    Jasmin Arteaga NP  Endocrinology  [facility]  CC: Aden Lopez

## 2018-03-02 NOTE — LETTER
3/2/2018         RE: Crispin Rojas  [address] [facility] [address]        Dear Colleague,    Thank you for referring your patient, Crispin Rojas, to the [facility]. Please see a copy of my visit note below.    Name: Crispin Rojas  Seen today for Diabetes (Last seen 2/20/2018).  HPI:  Crispin Rojas is a 57 year old male who presents for the evaluation/management of:    1. Type 2 DM:  Originally diagnosed at the age of: 1997.  Insulin treated 15 years.  Current Regimen: Metformin 1000 mg bid, Bydureon 2 mg weekly, Tresiba 55 units qd, Novolog 35 units (full meal) or 17 units for a snack tid + 2:50>150.  Started Bydureon about 1 month ago - tolerating well.  He feels the Bydureon has helped improve his blood sugars significantly.  Using personal CGM - noting BG spikes after meals but eventually decreases back to target range.  Only eats 1-2 meals per day, still takes Novolog tid - lower dose if eating a snack instead of a full meal, correction dose only if not eating.    BS checks:   Meter Download: average  (n23).  Blood sugar range .  Corwin CGM download: average .  64%     Right foot ulcer developed in September, blood sugars have been high since that time   History of foot ulcers - Avid golfer, gets blisters from golf shoes, + history of osteomyelitis    Complications:   Diabetes Complications  Description / Detail    Diabetic Retinopathy   + diabetic retinopathy, last eye exam 4/2017, next appointment 3/2/2018   CAD / PAD  No   Neuropathy   Yes, sees Dr. Molina   Nephropathy / Microalbuminuria  Yes, elevated microalbumin   Gastroparesis  No   Hypoglycemia Unawareness  No       2. Hypertension: Blood Pressure today:   BP Readings from Last 3 Encounters:   03/02/18 132/80   02/12/18 154/74   02/02/18 150/70   .  Blood pressure medications include Amlodipine 5 mg qd, lisinopril 20 mg qd.  Takes medications everyday without forgetting a dose.  Denies  feeling lightheaded or dizzy.  3. Hyperlipidemia: Takes Atorvastatin 40 mg qd for lipid control.  Denies muscle aches of pains.       4. Prevention:  Flu Shot-   Pneumovax- 11/2015  Opthalmology-annaully  Dental-recommend semiannually  ASA-  Smoking-   PMH/PSH:  Past Medical History:   Diagnosis Date     Cerebral infarction (H)      Chronic infection      H/O heartburn      History of heartburn      Hypertension      Numbness and tingling      Obesity      Renal stone      Type II or unspecified type diabetes mellitus without mention of complication, not stated as uncontrolled      Past Surgical History:   Procedure Laterality Date     AMPUTATE FOOT Left 8/18/2016    Procedure: AMPUTATE FOOT;  Surgeon: Jack Younger DPM;  Location: [facility] OR     AMPUTATE TOE(S) Right 2/1/2016    Procedure: AMPUTATE TOE(S);  Surgeon: Rachelle Manriquez DPM, Pod;  Location: [facility] OR     ANGIOGRAM       COLONOSCOPY       COMBINED CYSTOSCOPY, RETROGRADES, URETEROSCOPY, INSERT STENT Left 8/21/2016    Procedure: COMBINED CYSTOSCOPY, RETROGRADES, URETEROSCOPY, INSERT STENT;  Surgeon: Artemio Valenzuela MD;  Location: [facility] OR     COMBINED CYSTOSCOPY, RETROGRADES, URETEROSCOPY, LASER HOLMIUM LITHOTRIPSY URETER(S), INSERT STENT Left 10/3/2016    Procedure: COMBINED CYSTOSCOPY, RETROGRADES, URETEROSCOPY, LASER HOLMIUM LITHOTRIPSY URETER(S), INSERT STENT;  Surgeon: Artemio Valenzuela MD;  Location: [facility] OR     EXTRACORPOREAL SHOCK WAVE LITHOTRIPSY (ESWL) Left 9/8/2016    Procedure: EXTRACORPOREAL SHOCK WAVE LITHOTRIPSY (ESWL);  Surgeon: Artemio Valenzuela MD;  Location: [facility] OR     RECESSION GASTROCNEMIUS Right 2/1/2016    Procedure: RECESSION GASTROCNEMIUS;  Surgeon: Rachelle Manriquez DPM, Pod;  Location: [facility] OR     Family Hx:  Family History   Problem Relation Age of Onset     Arthritis Mother      SLE     Connective Tissue Disorder Mother      lupus     DIABETES Mother      CEREBROVASCULAR DISEASE Mother      CANCER Mother      DIABETES  Father      DIABETES Maternal Grandfather      DIABETES Sister      Thyroid disease:          DM2: Yes: Strong family history on mothers side - mom, 7 maternal uncles, two maternal aunts, + MGF.         Autoimmune: DM1, SLE, RA, Vitiligo Yes: Lupus    Social Hx:  Social History     Social History     Marital status:      Spouse name: Sydney     Number of children: 2     Years of education: N/A     Occupational History     marketing [employer]     Social History Main Topics     Smoking status: Never Smoker     Smokeless tobacco: Never Used     Alcohol use 0.0 oz/week     0 Standard drinks or equivalent per week      Comment: rare---red wine 2x per week     Drug use: No     Sexual activity: Yes     Partners: Female     Other Topics Concern     Parent/Sibling W/ Cabg, Mi Or Angioplasty Before 65f 55m? No     Social History Narrative          MEDICATIONS:  has a current medication list which includes the following prescription(s): exenatide er, insulin aspart, insulin degludec, continuous blood glucose monitoring, metformin, blood glucose, blood glucose monitoring, pen needles, cialis, cialis, amlodipine, freestyle lilly reader, ketoconazole, atorvastatin, lisinopril, gabapentin, blood glucose monitoring, co-enzyme q10, cholecalciferol, multiple vitamins-minerals, omega-3 fatty acids, thin, and bd insulin syringe ultrafine.    ROS     ROS: 10 point ROS neg other than the symptoms noted above in the HPI.    Physical Exam   VS: /80 (BP Location: Left arm, Patient Position: Chair, Cuff Size: Adult Large)  Pulse 64  Temp 97.9  F (36.6  C) (Oral)  Wt 114.7 kg (252 lb 12.8 oz)  SpO2 97%  BMI 35.66 kg/m2  GENERAL: NAD, well dressed, answering questions appropriately, appears stated age.  HEENT: OP clear, no exopthalmous, no proptosis, EOMI, no lig lag, no retraction, no scleral icterus  RESPIRATORY: Normal respiratory effort.  CARDIOVASCULAR: No peripheral edema.  EXTREMITIES: no edema,  +pulses, no rashes, no lesions  NEUROLOGY: CN grossly intact, no tremors  MSK: grossly intact, No digital cyanosis. Normal gait and station.  SKIN: no rashes, no lesions, no ulcers  PSYCH: Intact judgment and insight. A&OX3 with a cordial affect.    LABS:  A1c:   Component      Latest Ref Rng & Units 8/30/2016 12/5/2016 3/31/2017 12/27/2017   Hemoglobin A1C      4.3 - 6.0 % 6.7 (H) 7.3 (H) 7.5 (H) 9.6 (H)     Basic Metabolic Panel:  !COMPREHENSIVE Latest Ref Rng & Units 12/27/2017   SODIUM 133 - 144 mmol/L 139   POTASSIUM 3.4 - 5.3 mmol/L 4.4   CHLORIDE 94 - 109 mmol/L 105   CO2 mmol/L    BUN 7 - 30 mg/dL 20   Creatinine 0.66 - 1.25 mg/dL    Creatinine 0.66 - 1.25 mg/dL 0.83   Glucose 70 - 99 mg/dL 255 (H)   ANION GAP 3 - 14 mmol/L 8   CALCIUM 8.5 - 10.1 mg/dL 8.3 (L)     LFTS/Cholesterol Panel:  !LIPID/HEPATIC Latest Ref Rng & Units 12/27/2017   CHOLESTEROL <200 mg/dL 96   TRIGLYCERIDES <150 mg/dL 167 (H)   HDL CHOLESTEROL >39 mg/dL 26 (L)   LDL CHOLESTEROL DIRECT 0 - 129 mg/dL    LDL CHOLESTEROL, CALCULATED <100 mg/dL 37   VLDL-CHOLESTEROL 0 - 30 mg/dL    NON HDL CHOLESTEROL <130 mg/dL 70   CHOLESTEROL/HDL RATIO 0.0 - 5.0    AST 0 - 45 U/L 40   ALT 0 - 70 U/L 53     Thyroid Function:   !THYROID Latest Ref Rng & Units 12/27/2017   TSH 0.40 - 4.00 mU/L 1.78     Urine MicroAlbumin:  Component      Latest Ref Rng & Units 12/27/2017   Creatinine Urine      mg/dL 95   Albumin Urine mg/L      mg/L 23   Albumin Urine mg/g Cr      0 - 17 mg/g Cr 24.55 (H)     Vitamin D:    All pertinent notes, labs, and images personally reviewed by me.     A/P  Mr.Patrick AKASH Rojas is a 57 year old here for the evaluation/management of diabetes:    1. DM2 - Uncontrolled.  Recommend he take Novolog 15 minutes before eating - this should help keep BG from spiking so high after meals.  Continue Metformin 1000 mg bid, Bydureon 2 mg weekly, Tresiba 55 units qd, and Novolog tid before meals + 2:50>150.  Keep detailed food and insulin dose  records prior to diabetes ed follow up.  He would benefit from learning/reviewing carb counting and dosing Novolog using a I:C ratio - states he previously carb counted but it's been awhile.  He can work with ISELA Palmer, on carb counting and Novolog I:C ratio.  ILIAE follow-up: 2/9/18  Too early to repeat A1c, can recheck A1c the end of March.  There is some variability among people, most will usually develop symptoms suggestive of hypoglycemia when blood glucose levels are lowered to the mid 60's. The first set of symptoms are called adrenergic. Patients may experience any of the following nervousness, sweating, intense hunger, trembling, weakness, palpitations, and difficulty speaking.   The acute management of hypoglycemia involves the rapid delivery of a source of easily absorbed sugar. Regular soda, juice, lifesavers, table sugar, are good options. 15 grams of glucose is the dose that is given, followed by an assessment of symptoms and a blood glucose check if possible. If after 10 minutes there is no improvement, another 10-15 grams should be given. This can be repeated up to three times. The equivalency of 10-15 grams of glucose (approximate servings) are: 3-5 hard candies, 3 teaspoons of sugar, or 1/2 cup of regular soda or juice.      2. Hypertension - Controlled.    3. Hyperlipidemia - On statin therapy    Labs ordered today:   Orders Placed This Encounter   Procedures     Hemoglobin A1c     JUST IN CASE       Radiology/Consults ordered today: None    All questions were answered.  The patient indicates understanding of the above issues and agrees with the plan set forth.  Total face to face time greater than or equal to 25 minutes.       Follow-up:  3 months    Jasmin Arteaga NP  Endocrinology  [facility]  CC: Aden Lopez    Again, thank you for allowing me to participate in the care of your patient.        Sincerely,        SARAH Raymundo CNP

## 2018-03-12 ENCOUNTER — OFFICE VISIT (OUTPATIENT)
Dept: PODIATRY | Facility: CLINIC | Age: 58
End: 2018-03-12
Payer: COMMERCIAL

## 2018-03-12 VITALS
RESPIRATION RATE: 12 BRPM | SYSTOLIC BLOOD PRESSURE: 136 MMHG | WEIGHT: 262.4 LBS | BODY MASS INDEX: 37.01 KG/M2 | DIASTOLIC BLOOD PRESSURE: 74 MMHG

## 2018-03-12 DIAGNOSIS — E11.42 TYPE 2 DIABETES MELLITUS WITH PERIPHERAL NEUROPATHY (H): Primary | ICD-10-CM

## 2018-03-12 DIAGNOSIS — L97.411 DIABETIC ULCER OF RIGHT MIDFOOT ASSOCIATED WITH TYPE 2 DIABETES MELLITUS, LIMITED TO BREAKDOWN OF SKIN (H): ICD-10-CM

## 2018-03-12 DIAGNOSIS — L84 PRE-ULCERATIVE CALLUSES: ICD-10-CM

## 2018-03-12 DIAGNOSIS — E11.621 DIABETIC ULCER OF RIGHT MIDFOOT ASSOCIATED WITH TYPE 2 DIABETES MELLITUS, LIMITED TO BREAKDOWN OF SKIN (H): ICD-10-CM

## 2018-03-12 PROCEDURE — 11056 PARNG/CUTG B9 HYPRKR LES 2-4: CPT | Performed by: PODIATRIST

## 2018-03-12 PROCEDURE — 99213 OFFICE O/P EST LOW 20 MIN: CPT | Mod: 25 | Performed by: PODIATRIST

## 2018-03-12 NOTE — NURSING NOTE
"Chief Complaint   Patient presents with     Foot Problems     f/u ulcer right foot - improving        Initial /74  Resp 12  Wt 262 lb 6.4 oz (119 kg)  BMI 37.01 kg/m2 Estimated body mass index is 37.01 kg/(m^2) as calculated from the following:    Height as of 2/12/18: 5' 10.6\" (1.793 m).    Weight as of this encounter: 262 lb 6.4 oz (119 kg).  Medication Reconciliation: complete    "

## 2018-03-12 NOTE — MR AVS SNAPSHOT
After Visit Summary   3/12/2018    Crispin Rojas    MRN: [id number]           Patient Information     Date Of Birth          1960        Visit Information        Provider Department      3/12/2018 7:00 AM Valentino Molina DPM [facility]        Today's Diagnoses     Type 2 diabetes mellitus with peripheral neuropathy (H)    -  1    Diabetic ulcer of right midfoot associated with type 2 diabetes mellitus, limited to breakdown of skin (H)        Pre-ulcerative calluses           Follow-ups after your visit        Your next 10 appointments already scheduled     Mar 20, 2018  8:45 AM CDT   SHORT with Aden Lopez MD   [facility]    [address] [place] [id number]   [phone]            Mar 30, 2018  8:30 AM CDT   Diabetic Education with June Walker   [facility] ([facility])    [address] [place] [id number]   [phone]            Jun 22, 2018  8:15 AM CDT   LAB with CR LAB   [facility] ([facility])    [address] [place] [id number]   [phone]           Please do not eat 10-12 hours before your appointment if you are coming in fasting for labs on lipids, cholesterol, or glucose (sugar). This does not apply to pregnant women. Water, hot tea and black coffee (with nothing added) are okay. Do not drink other fluids, diet soda or chew gum.            Jun 22, 2018  8:30 AM CDT   Return Visit with SARAH Raymundo CNP   [facility] ([facility])    [address] [place] [id number]   [phone]              Who to contact     If you have questions or need follow up information about today's clinic visit or your schedule please contact [facility] directly at [phone].  Normal or  non-critical lab and imaging results will be communicated to you by MyChart, letter or phone within 4 business days after the clinic has received the results. If you do not hear from us within 7 days, please contact the clinic through [employer] or phone. If you have a critical or abnormal lab result, we will notify you by phone as soon as possible.  Submit refill requests through [employer] or call your pharmacy and they will forward the refill request to us. Please allow 3 business days for your refill to be completed.          Additional Information About Your Visit        [employer] Information     [employer] gives you secure access to your electronic health record. If you see a primary care provider, you can also send messages to your care team and make appointments. If you have questions, please call your primary care clinic.  If you do not have a primary care provider, please call [phone] and they will assist you.        Care EveryWhere ID     This is your Care EveryWhere ID. This could be used by other organizations to access your [place] medical records  [id number]        Your Vitals Were     Respirations BMI (Body Mass Index)                12 37.01 kg/m2           Blood Pressure from Last 3 Encounters:   03/12/18 136/74   03/02/18 132/80   02/12/18 154/74    Weight from Last 3 Encounters:   03/12/18 262 lb 6.4 oz (119 kg)   03/02/18 252 lb 12.8 oz (114.7 kg)   02/12/18 250 lb (113.4 kg)              We Performed the Following     TRIM HYPERKERATOTIC SKIN LESION,2-4        Primary Care Provider Office Phone # Fax #    Aden Ethan Lopez [phone] [address] [facility] [address]        Equal Access to Services     [facility] : Hadii geraldine Kan, waaxda luqadaha, qaybta kaalblanca romo. So [facility] [phone].    ATENCIÓN: Si habla español, tiene a gomez disposición servicios gratuitos de asistencia lingüística. Llame al  "[phone].    We comply with applicable federal civil rights laws and [place] laws. We do not discriminate on the basis of race, color, national origin, age, disability, sex, sexual orientation, or gender identity.            Thank you!     Thank you for choosing [facility]  for your care. Our goal is always to provide you with excellent care. Hearing back from our patients is one way we can continue to improve our services. Please take a few minutes to complete the written survey that you may receive in the mail after your visit with us. Thank you!             Your Updated Medication List - Protect others around you: Learn how to safely use, store and throw away your medicines at www.disposemymeds.org.          This list is accurate as of 3/12/18  7:47 AM.  Always use your most recent med list.                   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 Use one syringe  daily or as directed.    Type 2 diabetes, HbA1C goal < 8% (H)       blood glucose lancets standard    no brand specified    300 each    Use to test blood sugar 3 times daily or as directed.    Type 2 diabetes mellitus with diabetic neuropathy, with long-term current use of insulin (H)       blood glucose monitoring meter device kit    no brand specified    1 kit    Use to test blood sugar 3 times daily or as directed.    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)       blood glucose monitoring test strip    no brand specified    300 strip    Use " to test blood sugars 3 times daily or as directed    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)       * CIALIS 5 MG tablet   Generic drug:  tadalafil     30 tablet    TAKE 1 TABLET BY MOUTH EVERY DAY AS NEEDED    Erectile dysfunction due to diseases classified elsewhere       * CIALIS 5 MG tablet   Generic drug:  tadalafil     30 tablet    TAKE 1 TABLET BY MOUTH EVERY DAY AS NEEDED    Erectile dysfunction due to diseases classified elsewhere       Co-Enzyme Q10 100 MG Caps      Take 100 mg by mouth daily        continuous blood glucose monitoring sensor     3 each    For use with Freestyle Corwin Flash  for continuous monitioring of blood glucose levels. Replace sensor every 10 days.    Type 2 diabetes mellitus with diabetic peripheral angiopathy and gangrene, with long-term current use of insulin (H)       exenatide ER 2 MG pen    BYDUREON    4 kit    Inject 2 mg Subcutaneous every 7 days    Type 2 diabetes mellitus with peripheral neuropathy (H)       FREESTYLE CORWIN READER Iman     1 Device    1 Device as needed    Type 2 diabetes mellitus with diabetic peripheral angiopathy and gangrene, with long-term current use of insulin (H)       gabapentin 100 MG capsule    NEURONTIN     Take 300 mg by mouth At Bedtime        insulin aspart 100 UNIT/ML injection    NovoLOG FLEXPEN    30 mL    INJECT 10 TO 35 UNITS UNDER THE SKIN THREE TIMES DAILY    Type 2 diabetes mellitus with diabetic peripheral angiopathy and gangrene, with long-term current use of insulin (H)       insulin degludec 100 UNIT/ML pen    TRESIBA    30 mL    Inject 55 Units Subcutaneous daily    Type 2 diabetes mellitus with diabetic peripheral angiopathy and gangrene, with long-term current use of insulin (H)       ketoconazole 2 % shampoo    NIZORAL    120 mL    APPLY TO THE AFFECTED AREA AND WASH OFF AFTER 5 MINUTES.    Seborrheic dermatitis       lisinopril 20 MG tablet    PRINIVIL/ZESTRIL    90 tablet    Take 1 tablet (20 mg)  by mouth daily    Essential hypertension       metFORMIN 500 MG 24 hr tablet    GLUCOPHAGE-XR    360 tablet    TAKE 2 TABLETS BY MOUTH TWICE DAILY WITH MEALS    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)       MULTIVITAMIN PO      Take 1 tablet by mouth daily        OMEGA-3 FISH OIL PO      Take 2 g by mouth daily        Pen Needles 31G X 5 MM Misc     200 each    1 Device 5 times daily , TWICE DAILY WITH LANTUS AND 3 TIMES A DAY PRN WITH NOVOLOG FLEXPEN    Type 2 diabetes mellitus with peripheral neuropathy (H)       thin lancets    NO BRAND SPECIFIED    300 each    1 each 3 times daily.    Type 2 diabetes, HbA1C goal < 8% (H)       VITAMIN D3 PO      Take 1,000 Units by mouth daily        * Notice:  This list has 2 medication(s) that are the same as other medications prescribed for you. Read the directions carefully, and ask your doctor or other care provider to review them with you.

## 2018-03-12 NOTE — LETTER
"    3/12/2018         RE: Crispin Rojas  [address] [facility] [address]        Dear Colleague,    Thank you for referring your patient, Crispin Rojas, to the [facility]. Please see a copy of my visit note below.    ASSESSMENT/PLAN:  Encounter Diagnoses   Name Primary?     Type 2 diabetes mellitus with peripheral neuropathy (H) Yes     Diabetic ulcer of right midfoot associated with type 2 diabetes mellitus, limited to breakdown of skin (H)      Pre-ulcerative calluses      Interval healing of the right foot ulcer. No clinical signs of infection.   No new concerns    He is to monitor for redness, drainage, swelling, pain and seek immediate medical attention if his foot is worsening.     Continue current padding and wound cares.  It appears to be working.    Follow up in 4 weeks.    Both hyperkerotic lesions were paired down with a #15 scalpel.  There was mild bleeding.  The areas were cleansed with an alcohol wipe.  Bacitracin and bandaid applied.      Body mass index is 37.01 kg/(m^2).    Weight management plan: Patient was referred to their PCP to discuss a diet and exercise plan.      Valentino Molina DPM, FACFAS, MS    [place] Department of Podiatry/Foot & Ankle Surgery      ____________________________________________________________________    HPI:       Chief Complaint: follow up for ulceration, right foot;  Type 2 diabetes with peripheral neuropathy  Onset of problem: chronic, 5 + months  History well documented in previous visits.  No new concerns.  Wearing athletic shoes   He says that he uses \"some pads.\"  Did not tolerate orthoses.  Would like to return to golfing soon.  *  Past Medical History:   Diagnosis Date     Cerebral infarction (H)      Chronic infection      H/O heartburn      History of heartburn      Hypertension      Numbness and tingling      Obesity      Renal stone      Type II or unspecified type diabetes mellitus without mention of complication, " not stated as uncontrolled    *  *  Past Surgical History:   Procedure Laterality Date     AMPUTATE FOOT Left 8/18/2016    Procedure: AMPUTATE FOOT;  Surgeon: Jack Younger DPM;  Location: [facility] OR     AMPUTATE TOE(S) Right 2/1/2016    Procedure: AMPUTATE TOE(S);  Surgeon: Rachelle Manriquez DPM, Pod;  Location: [facility] OR     ANGIOGRAM       COLONOSCOPY       COMBINED CYSTOSCOPY, RETROGRADES, URETEROSCOPY, INSERT STENT Left 8/21/2016    Procedure: COMBINED CYSTOSCOPY, RETROGRADES, URETEROSCOPY, INSERT STENT;  Surgeon: Artemio Valenzuela MD;  Location: [facility] OR     COMBINED CYSTOSCOPY, RETROGRADES, URETEROSCOPY, LASER HOLMIUM LITHOTRIPSY URETER(S), INSERT STENT Left 10/3/2016    Procedure: COMBINED CYSTOSCOPY, RETROGRADES, URETEROSCOPY, LASER HOLMIUM LITHOTRIPSY URETER(S), INSERT STENT;  Surgeon: Artemio Valenzuela MD;  Location: [facility] OR     EXTRACORPOREAL SHOCK WAVE LITHOTRIPSY (ESWL) Left 9/8/2016    Procedure: EXTRACORPOREAL SHOCK WAVE LITHOTRIPSY (ESWL);  Surgeon: Artemio Valenzuela MD;  Location: [facility] OR     RECESSION GASTROCNEMIUS Right 2/1/2016    Procedure: RECESSION GASTROCNEMIUS;  Surgeon: Rachelle Manriquez DPM, Pod;  Location: [facility] OR   *  *  Current Outpatient Prescriptions   Medication Sig Dispense Refill     exenatide ER (BYDUREON) 2 MG pen Inject 2 mg Subcutaneous every 7 days 4 kit 3     insulin aspart (NOVOLOG FLEXPEN) 100 UNIT/ML injection INJECT 10 TO 35 UNITS UNDER THE SKIN THREE TIMES DAILY 30 mL 3     insulin degludec (TRESIBA) 100 UNIT/ML pen Inject 55 Units Subcutaneous daily 30 mL 3     continuous blood glucose monitoring (FREESTYLE CORWIN) sensor For use with Freestyle Corwin Flash  for continuous monitioring of blood glucose levels. Replace sensor every 10 days. 3 each 3     metFORMIN (GLUCOPHAGE-XR) 500 MG 24 hr tablet TAKE 2 TABLETS BY MOUTH TWICE DAILY WITH MEALS 360 tablet 1     blood glucose (NO BRAND SPECIFIED) lancets standard Use to test blood sugar 3 times daily or as  "directed. 300 each 3     blood glucose monitoring (NO BRAND SPECIFIED) test strip Use to test blood sugars 3 times daily or as directed 300 strip 3     Insulin Pen Needle (PEN NEEDLES) 31G X 5 MM MISC 1 Device 5 times daily , TWICE DAILY WITH LANTUS AND 3 TIMES A DAY PRN WITH NOVOLOG FLEXPEN 200 each 11     CIALIS 5 MG tablet TAKE 1 TABLET BY MOUTH EVERY DAY AS NEEDED 30 tablet 0     CIALIS 5 MG tablet TAKE 1 TABLET BY MOUTH EVERY DAY AS NEEDED 30 tablet 0     amLODIPine (NORVASC) 5 MG tablet TAKE 1 TABLET(5 MG) BY MOUTH DAILY 90 tablet 0     Continuous Blood Gluc  (FREESTYLE LUCERO READER) LESLY 1 Device as needed 1 Device 1     ketoconazole (NIZORAL) 2 % shampoo APPLY TO THE AFFECTED AREA AND WASH OFF AFTER 5 MINUTES. 120 mL 3     atorvastatin (LIPITOR) 40 MG tablet TAKE 1 TABLET BY MOUTH EVERY DAY 90 tablet 1     lisinopril (PRINIVIL/ZESTRIL) 20 MG tablet Take 1 tablet (20 mg) by mouth daily 90 tablet 3     gabapentin (NEURONTIN) 100 MG capsule Take 300 mg by mouth At Bedtime       blood glucose monitoring (NO BRAND SPECIFIED) meter device kit Use to test blood sugar 3 times daily or as directed. 1 kit 0     Co-Enzyme Q10 100 MG CAPS Take 100 mg by mouth daily        Cholecalciferol (VITAMIN D3 PO) Take 1,000 Units by mouth daily        Multiple Vitamins-Minerals (MULTIVITAMIN PO) Take 1 tablet by mouth daily        Omega-3 Fatty Acids (OMEGA-3 FISH OIL PO) Take 2 g by mouth daily        lancets thin MISC 1 each 3 times daily. 300 each 3     Insulin Syringe-Needle U-100 (BD INSULIN SYRINGE ULTRAFINE) 30G X 1/2\" 0.3 ML MISC 1 Syringe daily. Use one syringe  daily or as directed. 100 each prn       EXAM:      Constitutional/ general:  Pt is in no apparent distress, appears well-nourished.  Cooperative with history and physical exam.       Vascular:  Pedal pulses are palpable bilaterally for both the DP and PT arteries.  CFT < 3 sec.  No edema.  Pedal hair growth noted.       Neuro:  Alert and oriented x 3. " Coordinated gait.  Light touch sensation is diminished to the L4, L5, S1 distributions.  No evidence of neurological-based weakness, spasticity, or contracture in the lower extremities.       Derm:    1)  Partial thickness ulceration to the level of deeper skin, sub right 1st metatarsal head.    Less hyperkeratotic eschar around the edges.  No purulence. The wound does not probe deep.  0.3cm diameter.   2)  Hyperkeratotic lesion sub right 5th met head. No ulceration.      Musculoskeletal:    Lower extremity muscle strength is normal.  Patient is ambulatory without an assistive device or brace .  No gross deformities.  There is b/l limited ankle dorsiflexion with knee extended.   ROM is increased with flexion of the knee b/l.  Digital contractures.       Valentino Molina DPM, FACFAS, MS    [place] Department of Podiatry/Foot & Ankle Surgery    Again, thank you for allowing me to participate in the care of your patient.        Sincerely,        Valentino Molina DPM

## 2018-03-12 NOTE — PROGRESS NOTES
"ASSESSMENT/PLAN:  Encounter Diagnoses   Name Primary?     Type 2 diabetes mellitus with peripheral neuropathy (H) Yes     Diabetic ulcer of right midfoot associated with type 2 diabetes mellitus, limited to breakdown of skin (H)      Pre-ulcerative calluses      Interval healing of the right foot ulcer. No clinical signs of infection.   No new concerns    He is to monitor for redness, drainage, swelling, pain and seek immediate medical attention if his foot is worsening.     Continue current padding and wound cares.  It appears to be working.    Follow up in 4 weeks.    Both hyperkerotic lesions were paired down with a #15 scalpel.  There was mild bleeding.  The areas were cleansed with an alcohol wipe.  Bacitracin and bandaid applied.      Body mass index is 37.01 kg/(m^2).    Weight management plan: Patient was referred to their PCP to discuss a diet and exercise plan.      Valentino Molina DPM, FACFAS, [facility] Department of Podiatry/Foot & Ankle Surgery      ____________________________________________________________________    HPI:       Chief Complaint: follow up for ulceration, right foot;  Type 2 diabetes with peripheral neuropathy  Onset of problem: chronic, 5 + months  History well documented in previous visits.  No new concerns.  Wearing athletic shoes   He says that he uses \"some pads.\"  Did not tolerate orthoses.  Would like to return to golfing soon.  *  Past Medical History:   Diagnosis Date     Cerebral infarction (H)      Chronic infection      H/O heartburn      History of heartburn      Hypertension      Numbness and tingling      Obesity      Renal stone      Type II or unspecified type diabetes mellitus without mention of complication, not stated as uncontrolled    *  *  Past Surgical History:   Procedure Laterality Date     AMPUTATE FOOT Left 8/18/2016    Procedure: AMPUTATE FOOT;  Surgeon: Jack Younger DPM;  [facility]: [facility]     AMPUTATE TOE(S) Right 2/1/2016    Procedure: AMPUTATE " TOE(S);  Surgeon: Rachelle Manriquez DPM, Pod;  Location: [facility] OR     ANGIOGRAM       COLONOSCOPY       COMBINED CYSTOSCOPY, RETROGRADES, URETEROSCOPY, INSERT STENT Left 8/21/2016    Procedure: COMBINED CYSTOSCOPY, RETROGRADES, URETEROSCOPY, INSERT STENT;  Surgeon: Artemio Valenzuela MD;  Location: [facility]     COMBINED CYSTOSCOPY, RETROGRADES, URETEROSCOPY, LASER HOLMIUM LITHOTRIPSY URETER(S), INSERT STENT Left 10/3/2016    Procedure: COMBINED CYSTOSCOPY, RETROGRADES, URETEROSCOPY, LASER HOLMIUM LITHOTRIPSY URETER(S), INSERT STENT;  Surgeon: Artemio Valenzuela MD;  [facility]: [facility]     EXTRACORPOREAL SHOCK WAVE LITHOTRIPSY (ESWL) Left 9/8/2016    Procedure: EXTRACORPOREAL SHOCK WAVE LITHOTRIPSY (ESWL);  Surgeon: Artemio Valenzuela MD;  Location[facility] OR     RECESSION GASTROCNEMIUS Right 2/1/2016    Procedure: RECESSION GASTROCNEMIUS;  Surgeon: Rachelle Manriquez DPM, Pod;  Location: [facility] OR   *  *  Current Outpatient Prescriptions   Medication Sig Dispense Refill     exenatide ER (BYDUREON) 2 MG pen Inject 2 mg Subcutaneous every 7 days 4 kit 3     insulin aspart (NOVOLOG FLEXPEN) 100 UNIT/ML injection INJECT 10 TO 35 UNITS UNDER THE SKIN THREE TIMES DAILY 30 mL 3     insulin degludec (TRESIBA) 100 UNIT/ML pen Inject 55 Units Subcutaneous daily 30 mL 3     continuous blood glucose monitoring (FREESTYLE LUCERO) sensor For use with Freestyle Lucero Flash  for continuous monitioring of blood glucose levels. Replace sensor every 10 days. 3 each 3     metFORMIN (GLUCOPHAGE-XR) 500 MG 24 hr tablet TAKE 2 TABLETS BY MOUTH TWICE DAILY WITH MEALS 360 tablet 1     blood glucose (NO BRAND SPECIFIED) lancets standard Use to test blood sugar 3 times daily or as directed. 300 each 3     blood glucose monitoring (NO BRAND SPECIFIED) test strip Use to test blood sugars 3 times daily or as directed 300 strip 3     Insulin Pen Needle (PEN NEEDLES) 31G X 5 MM MISC 1 Device 5 times daily , TWICE DAILY WITH LANTUS AND 3 TIMES A  "DAY PRN WITH NOVOLOG FLEXPEN 200 each 11     CIALIS 5 MG tablet TAKE 1 TABLET BY MOUTH EVERY DAY AS NEEDED 30 tablet 0     CIALIS 5 MG tablet TAKE 1 TABLET BY MOUTH EVERY DAY AS NEEDED 30 tablet 0     amLODIPine (NORVASC) 5 MG tablet TAKE 1 TABLET(5 MG) BY MOUTH DAILY 90 tablet 0     Continuous Blood Gluc  (FREESTYLE LUCERO READER) LESLY 1 Device as needed 1 Device 1     ketoconazole (NIZORAL) 2 % shampoo APPLY TO THE AFFECTED AREA AND WASH OFF AFTER 5 MINUTES. 120 mL 3     atorvastatin (LIPITOR) 40 MG tablet TAKE 1 TABLET BY MOUTH EVERY DAY 90 tablet 1     lisinopril (PRINIVIL/ZESTRIL) 20 MG tablet Take 1 tablet (20 mg) by mouth daily 90 tablet 3     gabapentin (NEURONTIN) 100 MG capsule Take 300 mg by mouth At Bedtime       blood glucose monitoring (NO BRAND SPECIFIED) meter device kit Use to test blood sugar 3 times daily or as directed. 1 kit 0     Co-Enzyme Q10 100 MG CAPS Take 100 mg by mouth daily        Cholecalciferol (VITAMIN D3 PO) Take 1,000 Units by mouth daily        Multiple Vitamins-Minerals (MULTIVITAMIN PO) Take 1 tablet by mouth daily        Omega-3 Fatty Acids (OMEGA-3 FISH OIL PO) Take 2 g by mouth daily        lancets thin MISC 1 each 3 times daily. 300 each 3     Insulin Syringe-Needle U-100 (BD INSULIN SYRINGE ULTRAFINE) 30G X 1/2\" 0.3 ML MISC 1 Syringe daily. Use one syringe  daily or as directed. 100 each prn       EXAM:      Constitutional/ general:  Pt is in no apparent distress, appears well-nourished.  Cooperative with history and physical exam.       Vascular:  Pedal pulses are palpable bilaterally for both the DP and PT arteries.  CFT < 3 sec.  No edema.  Pedal hair growth noted.       Neuro:  Alert and oriented x 3. Coordinated gait.  Light touch sensation is diminished to the L4, L5, S1 distributions.  No evidence of neurological-based weakness, spasticity, or contracture in the lower extremities.       Derm:    1)  Partial thickness ulceration to the level of deeper skin, sub " right 1st metatarsal head.    Less hyperkeratotic eschar around the edges.  No purulence. The wound does not probe deep.  0.3cm diameter.   2)  Hyperkeratotic lesion sub right 5th met head. No ulceration.      Musculoskeletal:    Lower extremity muscle strength is normal.  Patient is ambulatory without an assistive device or brace .  No gross deformities.  There is b/l limited ankle dorsiflexion with knee extended.   ROM is increased with flexion of the knee b/l.  Digital contractures.       Valentino Molina DPM, FACFAS, MS    [place] Department of Podiatry/Foot & Ankle Surgery

## 2018-03-16 ENCOUNTER — TRANSFERRED RECORDS (OUTPATIENT)
Dept: HEALTH INFORMATION MANAGEMENT | Facility: CLINIC | Age: 58
End: 2018-03-16

## 2018-03-20 ENCOUNTER — OFFICE VISIT (OUTPATIENT)
Dept: FAMILY MEDICINE | Facility: CLINIC | Age: 58
End: 2018-03-20
Payer: COMMERCIAL

## 2018-03-20 VITALS
RESPIRATION RATE: 14 BRPM | HEART RATE: 79 BPM | TEMPERATURE: 98.1 F | SYSTOLIC BLOOD PRESSURE: 160 MMHG | WEIGHT: 257.1 LBS | DIASTOLIC BLOOD PRESSURE: 85 MMHG | OXYGEN SATURATION: 92 % | BODY MASS INDEX: 36.27 KG/M2

## 2018-03-20 DIAGNOSIS — E11.51 TYPE 2 DIABETES MELLITUS WITH PERIPHERAL VASCULAR DISEASE (H): Primary | ICD-10-CM

## 2018-03-20 DIAGNOSIS — E11.42 TYPE 2 DIABETES MELLITUS WITH PERIPHERAL NEUROPATHY (H): ICD-10-CM

## 2018-03-20 LAB — HBA1C MFR BLD: 7.5 % (ref 4.3–6)

## 2018-03-20 PROCEDURE — 99214 OFFICE O/P EST MOD 30 MIN: CPT | Performed by: FAMILY MEDICINE

## 2018-03-20 PROCEDURE — 83036 HEMOGLOBIN GLYCOSYLATED A1C: CPT | Performed by: FAMILY MEDICINE

## 2018-03-20 PROCEDURE — 36415 COLL VENOUS BLD VENIPUNCTURE: CPT | Performed by: FAMILY MEDICINE

## 2018-03-20 NOTE — MR AVS SNAPSHOT
After Visit Summary   3/20/2018    Crispin Rojas    MRN: [id number]           Patient Information     Date Of Birth          1960        Visit Information        Provider Department      3/20/2018 8:45 AM Aden Lopez MD [facility]        Today's Diagnoses     Type 2 diabetes mellitus with peripheral vascular disease (H)    -  1       Follow-ups after your visit        Your next 10 appointments already scheduled     Mar 30, 2018  8:30 AM CDT   Diabetic Education with June Walker   [facility] ([facility])    [id number] [place] [id number]   [phone]            Apr 16, 2018  7:00 AM CDT   Return Visit with Valentino Molina DPM   [facility] ([facility])    [address] [place] [id number] [phone]            Jun 22, 2018  8:15 AM CDT   LAB with [facility]   [facility] ([facility]    [address] [place] [id number] [phone]           Please do not eat 10-12 hours before your appointment if you are coming in fasting for labs on lipids, cholesterol, or glucose (sugar). This does not apply to pregnant women. Water, hot tea and black coffee (with nothing added) are okay. Do not drink other fluids, diet soda or chew gum.            Jun 22, 2018  8:30 AM CDT   Return Visit with SARAH [facility]mundo [facility] ([facility])    [id number] [place] [id number] [phone]              Who to contact     If you have questions or need follow up information about today's clinic visit or your schedule please contact [facility] directly at [phone].  Normal or non-critical lab and imaging results will be communicated to you by MyChart, letter or phone within 4 business days after the clinic has  received the results. If you do not hear from us within 7 days, please contact the clinic through [employer] or phone. If you have a critical or abnormal lab result, we will notify you by phone as soon as possible.  Submit refill requests through [employer] or call your pharmacy and they will forward the refill request to us. Please allow 3 business days for your refill to be completed.          Additional Information About Your Visit        [employer] Information     [employer] gives you secure access to your electronic health record. If you see a primary care provider, you can also send messages to your care team and make appointments. If you have questions, please call your primary care clinic.  If you do not have a primary care provider, please call [phone] and they will assist you.        Care EveryWhere ID     This is your Care EveryWhere ID. This could be used by other organizations to access your [facility] medical records  [id number]        Your Vitals Were     Pulse Temperature Respirations Pulse Oximetry BMI (Body Mass Index)       79 98.1  F (36.7  C) (Oral) 14 92% 36.27 kg/m2        Blood Pressure from Last 3 Encounters:   03/20/18 160/85   03/12/18 136/74   03/02/18 132/80    Weight from Last 3 Encounters:   03/20/18 257 lb 1.6 oz (116.6 kg)   03/12/18 262 lb 6.4 oz (119 kg)   03/02/18 252 lb 12.8 oz (114.7 kg)              We Performed the Following     Hemoglobin A1c          Today's Medication Changes          These changes are accurate as of 3/20/18  9:36 AM.  If you have any questions, ask your nurse or doctor.               Start taking these medicines.        Dose/Directions    insulin lispro 100 UNIT/ML injection   Commonly known as:  HumaLOG KWIKpen   Used for:  Type 2 diabetes mellitus with peripheral vascular disease (H)   Started by:  Aden Lopez MD        35  units before breakfast, 35 units before lunch, 35  units before dinner   Quantity:  30 mL   Refills:  3         Stop taking  these medicines if you haven't already. Please contact your care team if you have questions.     insulin aspart 100 UNIT/ML injection   Commonly known as:  NovoLOG FLEXPEN   Stopped by:  Aden Lopez MD                Where to get your medicines      These medications were sent to [id number] - [place], [place] - [address] [place] [facility] [place] [address]  [address], [facility] [address]     Phone:  [phone]     insulin lispro 100 UNIT/ML injection                Primary Care Provider Office Phone # Fax #    Aden Lopez [phone] [address] [facility] [address]        Equal Access to Services     YE MARTÍNEZ : Hadii aad ku hadasho Soomaali, waaxda luqadaha, qaybta kaalmada adeegyada, waxay idiin hayaan adeeg kharajasson larosario . So [facility] [phone].    ATENCIÓN: Si habla español, tiene a gomez disposición servicios gratuitos de asistencia lingüística. [facility] [phone].    We comply with applicable federal civil rights laws and [place] laws. We do not discriminate on the basis of race, color, national origin, age, disability, sex, sexual orientation, or gender identity.            Thank you!     Thank you for choosing [facility]  for your care. Our goal is always to provide you with excellent care. Hearing back from our patients is one way we can continue to improve our services. Please take a few minutes to complete the written survey that you may receive in the mail after your visit with us. Thank you!             Your Updated Medication List - Protect others around you: Learn how to safely use, store and throw away your medicines at www.disposemymeds.org.          This list is accurate as of 3/20/18  9:36 AM.  Always use your most recent med list.                   Brand Name Dispense Instructions for use Diagnosis    amLODIPine 5 MG tablet    NORVASC    90 tablet    TAKE 1 TABLET(5 MG) BY MOUTH DAILY    Other  "specified transient cerebral ischemias       atorvastatin 40 MG tablet    LIPITOR    90 tablet    TAKE 1 TABLET BY MOUTH EVERY DAY    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), Hyperlipidemia LDL goal <100       BD insulin syringe ultrafine 30G X 1/2\" 0.3 ML   Generic drug:  insulin syringe-needle U-100     100 each    1 Syringe daily. Use one syringe  daily or as directed.    Type 2 diabetes, HbA1C goal < 8% (H)       blood glucose lancets standard    no brand specified    300 each    Use to test blood sugar 3 times daily or as directed.    Type 2 diabetes mellitus with diabetic neuropathy, with long-term current use of insulin (H)       blood glucose monitoring meter device kit    no brand specified    1 kit    Use to test blood sugar 3 times daily or as directed.    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 Replace sensor every 10 days.    Type 2 diabetes mellitus with diabetic peripheral angiopathy and gangrene, with long-term current use of insulin (H)       exenatide ER 2 MG pen    BYDUREON    4 kit    Inject 2 mg Subcutaneous every 7 days    Type 2 diabetes mellitus with peripheral neuropathy (H)       FREESTYLE " LUCERO READER Iman     1 Device    1 Device as needed    Type 2 diabetes mellitus with diabetic peripheral angiopathy and gangrene, with long-term current use of insulin (H)       gabapentin 100 MG capsule    NEURONTIN     Take 300 mg by mouth At Bedtime        insulin degludec 100 UNIT/ML pen    TRESIBA    30 mL    Inject 55 Units Subcutaneous daily    Type 2 diabetes mellitus with diabetic peripheral angiopathy and gangrene, with long-term current use of insulin (H)       insulin lispro 100 UNIT/ML injection    HumaLOG KWIKpen    30 mL    35  units before breakfast, 35 units before lunch, 35  units before dinner    Type 2 diabetes mellitus with peripheral vascular disease (H)       ketoconazole 2 % shampoo    NIZORAL    120 mL    APPLY TO THE AFFECTED AREA AND WASH OFF AFTER 5 MINUTES.    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)       MULTIVITAMIN PO      Take 1 tablet by mouth daily        OMEGA-3 FISH OIL PO      Take 2 g by mouth daily        Pen Needles 31G X 5 MM Misc     200 each    1 Device 5 times daily , TWICE DAILY WITH LANTUS AND 3 TIMES A DAY PRN WITH NOVOLOG FLEXPEN    Type 2 diabetes mellitus with peripheral neuropathy (H)       thin lancets    NO BRAND SPECIFIED    300 each    1 each 3 times daily.    Type 2 diabetes, HbA1C goal < 8% (H)       VITAMIN D3 PO      Take 1,000 Units by mouth daily        * Notice:  This list has 2 medication(s) that are the same as other medications prescribed for you. Read the directions carefully, and ask your doctor or other care provider to review them with you.

## 2018-03-20 NOTE — PROGRESS NOTES
Answers for HPI/ROS submitted by the patient on 3/20/2018   Chronic problems general questions HPI Form  Diet:: Diabetic  Frequency of exercise:: None  Taking medications regularly:: Yes  Medication side effects:: None  Additional concerns today:: YES  PHQ-2 Score: 0  Frequency of checking blood sugars:: 4 times a day  Diabetic concerns:: None  Hypoglycemia symptoms:: Shaky  Paraesthesia present:: YES  Eye Exam in the last year:: Yes  Date of last Diabetic Eye Exam:: march 16 2018  PHQ9 TOTAL SCORE: Incomplete    SUBJECTIVE:   Crispin Rojas is a 58 year old male who presents to clinic today for the following health issues:      Patient is here to get an A1C lab drawn.  SUBJECTIVE:    CC: Crispin Rojas is a 58 year old male who presents for longstanding DM with previous vasculopathy    HPI: now on bydureon and needs to switch novolog to humalog and get an A1c         PROBLEM LIST:                   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     CARDIOVASCULAR SCREENING; LDL GOAL LESS THAN 100     Ulcer of lower limb, right, with necrosis of muscle (H)     Morbid obesity, unspecified obesity type (H)     Other specified transient cerebral ischemias     Osteomyelitis (H)     Calculus of kidney     Chronic left shoulder pain     Cervicalgia     Type 2 diabetes mellitus with peripheral neuropathy (H)       PAST MEDICAL HISTORY:                  Past Medical History:   Diagnosis Date     Cerebral infarction (H)      Chronic infection      H/O heartburn      History of heartburn      Hypertension      Numbness and tingling      Obesity      Renal stone      Type II or unspecified type diabetes mellitus without mention of complication, not stated as uncontrolled        PAST SURGICAL HISTORY:                  Past Surgical History:   Procedure Laterality Date     AMPUTATE FOOT Left 8/18/2016    Procedure: AMPUTATE FOOT;  Surgeon: Jack Younger DPM;  Location:   OR     AMPUTATE TOE(S) Right 2/1/2016    Procedure: AMPUTATE TOE(S);  Surgeon: Rachelle Manriquez DPM, Pod;  Location: [facility] OR     ANGIOGRAM       COLONOSCOPY       COMBINED CYSTOSCOPY, RETROGRADES, URETEROSCOPY, INSERT STENT Left 8/21/2016    Procedure: COMBINED CYSTOSCOPY, RETROGRADES, URETEROSCOPY, INSERT STENT;  Surgeon: Artemio Valenzuela MD;  Location: [facility] OR     COMBINED CYSTOSCOPY, RETROGRADES, URETEROSCOPY, LASER HOLMIUM LITHOTRIPSY URETER(S), INSERT STENT Left 10/3/2016    Procedure: COMBINED CYSTOSCOPY, RETROGRADES, URETEROSCOPY, LASER HOLMIUM LITHOTRIPSY URETER(S), INSERT STENT;  Surgeon: Artemio Valenzuela MD;  Location: [facility] OR     EXTRACORPOREAL SHOCK WAVE LITHOTRIPSY (ESWL) Left 9/8/2016    Procedure: EXTRACORPOREAL SHOCK WAVE LITHOTRIPSY (ESWL);  Surgeon: Artemio Valenzuela MD;  Location: [facility] OR     RECESSION GASTROCNEMIUS Right 2/1/2016    Procedure: RECESSION GASTROCNEMIUS;  Surgeon: Rachelle Manriquez DPM, Pod;  Location: [facility] OR       CURRENT MEDICATIONS:                  Current Outpatient Prescriptions   Medication Sig Dispense Refill     exenatide ER (BYDUREON) 2 MG pen Inject 2 mg Subcutaneous every 7 days 4 kit 3     insulin aspart (NOVOLOG FLEXPEN) 100 UNIT/ML injection INJECT 10 TO 35 UNITS UNDER THE SKIN THREE TIMES DAILY 30 mL 3     insulin degludec (TRESIBA) 100 UNIT/ML pen Inject 55 Units Subcutaneous daily 30 mL 3     continuous blood glucose monitoring (FREESTYLE LUCERO) sensor For use with Freestyle Lucero Flash  for continuous monitioring of blood glucose levels. Replace sensor every 10 days. 3 each 3     metFORMIN (GLUCOPHAGE-XR) 500 MG 24 hr tablet TAKE 2 TABLETS BY MOUTH TWICE DAILY WITH MEALS 360 tablet 1     blood glucose (NO BRAND SPECIFIED) lancets standard Use to test blood sugar 3 times daily or as directed. 300 each 3     blood glucose monitoring (NO BRAND SPECIFIED) test strip Use to test blood sugars 3 times daily or as directed 300 strip 3     Insulin Pen  "Needle (PEN NEEDLES) 31G X 5 MM MISC 1 Device 5 times daily , TWICE DAILY WITH LANTUS AND 3 TIMES A DAY PRN WITH NOVOLOG FLEXPEN 200 each 11     CIALIS 5 MG tablet TAKE 1 TABLET BY MOUTH EVERY DAY AS NEEDED 30 tablet 0     CIALIS 5 MG tablet TAKE 1 TABLET BY MOUTH EVERY DAY AS NEEDED 30 tablet 0     amLODIPine (NORVASC) 5 MG tablet TAKE 1 TABLET(5 MG) BY MOUTH DAILY 90 tablet 0     Continuous Blood Gluc  (FREESTYLE LUCERO READER) LESLY 1 Device as needed 1 Device 1     ketoconazole (NIZORAL) 2 % shampoo APPLY TO THE AFFECTED AREA AND WASH OFF AFTER 5 MINUTES. 120 mL 3     atorvastatin (LIPITOR) 40 MG tablet TAKE 1 TABLET BY MOUTH EVERY DAY 90 tablet 1     lisinopril (PRINIVIL/ZESTRIL) 20 MG tablet Take 1 tablet (20 mg) by mouth daily 90 tablet 3     gabapentin (NEURONTIN) 100 MG capsule Take 300 mg by mouth At Bedtime       blood glucose monitoring (NO BRAND SPECIFIED) meter device kit Use to test blood sugar 3 times daily or as directed. 1 kit 0     Co-Enzyme Q10 100 MG CAPS Take 100 mg by mouth daily        Cholecalciferol (VITAMIN D3 PO) Take 1,000 Units by mouth daily        Multiple Vitamins-Minerals (MULTIVITAMIN PO) Take 1 tablet by mouth daily        Omega-3 Fatty Acids (OMEGA-3 FISH OIL PO) Take 2 g by mouth daily        lancets thin MISC 1 each 3 times daily. 300 each 3     Insulin Syringe-Needle U-100 (BD INSULIN SYRINGE ULTRAFINE) 30G X 1/2\" 0.3 ML MISC 1 Syringe daily. Use one syringe  daily or as directed. 100 each prn             FAMILY HISTORY:                   Family History   Problem Relation Age of Onset     Arthritis Mother      SLE     Connective Tissue Disorder Mother      lupus     DIABETES Mother      CEREBROVASCULAR DISEASE Mother      CANCER Mother      DIABETES Father      DIABETES Maternal Grandfather      DIABETES Sister        HEALTH MAINTENANCE:                    REVIEW OF OUTSIDE RECORDS: NO    REVIEW OF SYSTEMS:  CONSTITUTIONAL: NEGATIVE for fever, chills  INTEGUMENTARY/SKIN: " NEGATIVE for worrisome rashes, moles or lesions  EYES: NEGATIVE for vision changes   ENT/MOUTH: NEGATIVE for ear, mouth and throat problems  RESP: NEGATIVE for significant cough or SOB  CV: NEGATIVE for chest pain, palpitations   GI: NEGATIVE for nausea, abdominal pain, heartburn, or change in bowel habits  : NEGATIVE for frequency, dysuria, or hematuria  MUSCULOSKELETAL: NEGATIVE for significant arthralgias or myalgia  NEURO: NEGATIVE for weakness, dizziness or paresthesias or headache  ENDOCRINE: NEGATIVE for temperature intolerance, skin/hair changes  NVS:no headache or balance issus  INTEG:no moles or new rashes  LYMPH:no nodes or night sweats    EXAM:    /85 (BP Location: Left arm, Patient Position: Chair, Cuff Size: Adult Large)  Pulse 79  Temp 98.1  F (36.7  C) (Oral)  Resp 14  Wt 257 lb 1.6 oz (116.6 kg)  SpO2 92%  BMI 36.27 kg/m2  GENERAL APPEARANCE: healthy, alert and no distress   EXAM:  GENERAL APPEARANCE: healthy, alert and no distress  EYES: EOMI,  PERRL  HENT: ear canals and TM's normal and nose and mouth without ulcers or lesions  RESP: lungs clear to auscultation - no rales, rhonchi or wheezes  CV: regular rates and rhythm, normal S1 S2, no S3 or S4 and no murmur, click or rub -  ABDOMEN:  soft, nontender, no HSM or masses and bowel sounds normal  SKIN: no suspicious lesions or rashes  BACK:no tenderness or pain on straight let raise           ASSESSMENT/PLAN  (E11.51) Type 2 diabetes mellitus with peripheral vascular disease (H)  (primary encounter diagnosis)  Comment:   Plan: insulin lispro (HUMALOG KWIKPEN) 100 UNIT/ML         injection, Hemoglobin A1c        MED REview,         Go over his coverage routine  Current Outpatient Prescriptions   Medication     insulin lispro (HUMALOG KWIKPEN) 100 UNIT/ML injection     exenatide ER (BYDUREON) 2 MG pen     insulin degludec (TRESIBA) 100 UNIT/ML pen     continuous blood glucose monitoring (FREESTYLE LUCERO) sensor     metFORMIN  "(GLUCOPHAGE-XR) 500 MG 24 hr tablet     blood glucose (NO BRAND SPECIFIED) lancets standard     blood glucose monitoring (NO BRAND SPECIFIED) test strip     Insulin Pen Needle (PEN NEEDLES) 31G X 5 MM MISC     CIALIS 5 MG tablet     CIALIS 5 MG tablet     amLODIPine (NORVASC) 5 MG tablet     Continuous Blood Gluc  (FREESTYLE LUCERO READER) LESLY     ketoconazole (NIZORAL) 2 % shampoo     atorvastatin (LIPITOR) 40 MG tablet     lisinopril (PRINIVIL/ZESTRIL) 20 MG tablet     gabapentin (NEURONTIN) 100 MG capsule     blood glucose monitoring (NO BRAND SPECIFIED) meter device kit     Co-Enzyme Q10 100 MG CAPS     Cholecalciferol (VITAMIN D3 PO)     Multiple Vitamins-Minerals (MULTIVITAMIN PO)     Omega-3 Fatty Acids (OMEGA-3 FISH OIL PO)     lancets thin MISC     Insulin Syringe-Needle U-100 (BD INSULIN SYRINGE ULTRAFINE) 30G X 1/2\" 0.3 ML MISC     No current facility-administered medications for this visit.      Check a1c for progress now 7.5    (E11.51) Type 2 diabetes mellitus with peripheral vascular disease (H)  (primary encounter diagnosis)  Comment:   Plan: insulin lispro (HUMALOG KWIKPEN) 100 UNIT/ML         injection, Hemoglobin A1c            (E11.42) Type 2 diabetes mellitus with peripheral neuropathy (H)  Comment:   Plan: a1c                                      I have discussed with patient the risks, benefits, medications, treatment options and modalities.   I have instructed the patient to call or schedule a follow-up appointment if any problems or failure to improve.                         "

## 2018-03-30 ENCOUNTER — ALLIED HEALTH/NURSE VISIT (OUTPATIENT)
Dept: EDUCATION SERVICES | Facility: CLINIC | Age: 58
End: 2018-03-30
Payer: COMMERCIAL

## 2018-03-30 DIAGNOSIS — E11.42 TYPE 2 DIABETES MELLITUS WITH PERIPHERAL NEUROPATHY (H): Primary | ICD-10-CM

## 2018-03-30 PROCEDURE — G0108 DIAB MANAGE TRN  PER INDIV: HCPCS | Performed by: DIETITIAN, REGISTERED

## 2018-03-30 NOTE — MR AVS SNAPSHOT
After Visit Summary   3/30/2018    Crispin Rojas    MRN: [id number]           Patient Information     Date Of Birth          1960        Visit Information        Provider Department      3/30/2018 8:30 AM June Walker [facility]        Today's Diagnoses     Type 2 diabetes mellitus with peripheral neuropathy (H)    -  1       Follow-ups after your visit        Your next 10 appointments already scheduled     Apr 16, 2018  7:00 AM CDT   [facility]    [address] [place] [id number] [phone]            Apr 20, 2018  8:30 AM CDT   Diabetic Education with June Walker   [facility] ([facility])    [address] [place] [address] [id number] [phone]            Jun 22, 2018  8:15 AM CDT   LAB with [facility]   [facility] ([facility]    [address] [place] [address] [id number] [phone]           Please do not eat 10-12 hours before your appointment if you are coming in fasting for labs on lipids, cholesterol, or glucose (sugar). This does not apply to pregnant women. Water, hot tea and black coffee (with nothing added) are okay. Do not drink other fluids, diet soda or chew gum.            Jun 22, 2018  8:30 AM CDT   Return Visit with SARAH Raymundo CNP   [facility] ([facility])    [id number] [place] [address] [phone]              Who to contact     If you have questions or need follow up information about today's clinic visit or your schedule please contact [facility] directly at [phone].  Normal or non-critical lab and imaging results will be communicated to you by MyChart, letter or phone within 4 business days after the clinic  has received the results. If you do not hear from us within 7 days, please contact the clinic through [facility] or phone. If you have a critical or abnormal lab result, we will notify you by phone as soon as possible.  Submit refill requests through [facility] or call your pharmacy and they will forward the refill request to us. Please allow 3 business days for your refill to be completed.          Additional Information About Your Visit        [employer] Information     [facility] gives you secure access to your electronic health record. If you see a primary care provider, you can also send messages to your care team and make appointments. If you have questions, please call your primary care clinic.  If you do not have a primary care provider, please call [phone] and they will assist you.        Care EveryWhere ID     This is your Care EveryWhere ID. This could be used by other organizations to access your [facility] medical records  [id number]         Blood Pressure from Last 3 Encounters:   03/20/18 160/85   03/12/18 136/74   03/02/18 132/80    Weight from Last 3 Encounters:   03/20/18 116.6 kg (257 lb 1.6 oz)   03/12/18 119 kg (262 lb 6.4 oz)   03/02/18 114.7 kg (252 lb 12.8 oz)              We Performed the Following     DIABETES EDUCATION - Individual  []          Today's Medication Changes          These changes are accurate as of 3/30/18 11:59 PM.  If you have any questions, ask your nurse or doctor.               Start taking these medicines.        Dose/Directions    insulin aspart 100 UNIT/ML injection   Commonly known as:  FIASP FLEXTOUCH   Used for:  Type 2 diabetes mellitus with peripheral neuropathy (H)        Dose:  35 Units   Inject 35 Units Subcutaneous 3 times daily (with meals)   Quantity:  30 mL   Refills:  3         Stop taking these medicines if you haven't already. Please contact your care team if you have questions.     insulin lispro 100 UNIT/ML injection   Commonly known as:  HumaLOG  KWIKpen                Where to get your medicines      These medications were sent to [id number] - [place], [place] - [address] AT [facility] [address]  [address], [facility] [address]     Phone:  [phone]     insulin aspart 100 UNIT/ML injection                Primary Care Provider Office Phone # Fax #    Aden Ethan Lopez [phone] [address] [facility] [address]        Equal Access to Services     [facility] : Hadii aad ku hadasho Soomaali, waaxda luqadaha, qaybta kaalmada adeegyada, waxay idiin hayaan adeeg kharajasson moran . So [facility] [phone].    ATENCIÓN: Si habla español, tiene a gomez disposición servicios gratuitos de asistencia lingüística. [facility] [phone].    We comply with applicable federal civil rights laws and [place] laws. We do not discriminate on the basis of race, color, national origin, age, disability, sex, sexual orientation, or gender identity.            Thank you!     Thank you for choosing [facility]  for your care. Our goal is always to provide you with excellent care. Hearing back from our patients is one way we can continue to improve our services. Please take a few minutes to complete the written survey that you may receive in the mail after your visit with us. Thank you!             Your Updated Medication List - Protect others around you: Learn how to safely use, store and throw away your medicines at www.disposemymeds.org.          This list is accurate as of 3/30/18 11:59 PM.  Always use your most recent med list.                   Brand Name Dispense Instructions for use Diagnosis    amLODIPine 5 MG tablet    NORVASC    90 tablet    TAKE 1 TABLET(5 MG) BY MOUTH DAILY    Other specified transient cerebral ischemias       atorvastatin 40 MG tablet    LIPITOR    90 tablet    TAKE 1 TABLET BY MOUTH EVERY DAY    Diabetes mellitus type 2 with neurological manifestations  "(H), Hyperlipidemia LDL goal <100       BD insulin syringe ultrafine 30G X 1/2\" 0.3 ML   Generic drug:  insulin syringe-needle U-100     100 each    1 Syringe daily. Use one syringe  daily or as directed.    Type 2 diabetes, HbA1C goal < 8% (H)       blood glucose lancets standard    no brand specified    300 each    Use to test blood sugar 3 times daily or as directed.    Type 2 diabetes mellitus with diabetic neuropathy, with long-term current use of insulin (H)       blood glucose monitoring meter device kit    no brand specified    1 kit    Use to test blood sugar 3 times daily or as directed.    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 Replace sensor every 10 days.    Type 2 diabetes mellitus with diabetic peripheral angiopathy and gangrene, with long-term current use of insulin (H)       exenatide ER 2 MG pen    BYDUREON    4 kit    Inject 2 mg Subcutaneous every 7 days    Type 2 diabetes mellitus with peripheral neuropathy (H)       FREESTYLE CORWIN READER Iman     1 Device    1 Device as needed    Type 2 diabetes mellitus with diabetic peripheral angiopathy and gangrene, with long-term current use of insulin (H)       gabapentin " 100 MG capsule    NEURONTIN     Take 300 mg by mouth At Bedtime        insulin aspart 100 UNIT/ML injection    FIASP FLEXTOUCH    30 mL    Inject 35 Units Subcutaneous 3 times daily (with meals)    Type 2 diabetes mellitus with peripheral neuropathy (H)       insulin degludec 100 UNIT/ML pen    TRESIBA    30 mL    Inject 55 Units Subcutaneous daily    Type 2 diabetes mellitus with diabetic peripheral angiopathy and gangrene, with long-term current use of insulin (H)       ketoconazole 2 % shampoo    NIZORAL    120 mL    APPLY TO THE AFFECTED AREA AND WASH OFF AFTER 5 MINUTES.    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)       MULTIVITAMIN PO      Take 1 tablet by mouth daily        OMEGA-3 FISH OIL PO      Take 2 g by mouth daily        Pen Needles 31G X 5 MM Misc     200 each    1 Device 5 times daily , TWICE DAILY WITH LANTUS AND 3 TIMES A DAY PRN WITH NOVOLOG FLEXPEN    Type 2 diabetes mellitus with peripheral neuropathy (H)       thin lancets    NO BRAND SPECIFIED    300 each    1 each 3 times daily.    Type 2 diabetes, HbA1C goal < 8% (H)       VITAMIN D3 PO      Take 1,000 Units by mouth daily        * Notice:  This list has 2 medication(s) that are the same as other medications prescribed for you. Read the directions carefully, and ask your doctor or other care provider to review them with you.

## 2018-03-30 NOTE — LETTER
"    3/30/2018         RE: Crispin Rojas  [address] [facility] [address]        Dear Colleague,    Thank you for referring your patient, Crispin Rojas, to the [facility]. Please see a copy of my visit note below.    Diabetes Self Management Training: Follow-up Visit    Crispin Rojas presents today for education and evaluation of glucose control related to Type 2 diabetes.    He is accompanied by self    Patient's diabetes management related comments/concerns: was in [place], eating was off, and missed several insulin injections, had started keeping a food and insulin record as recommended by Jasmin Arteaga NP at last visit- but states he \"forgot it in [place]\"    Patient would like this visit to be focused around the following diabetes-related behaviors and goals: Monitoring and Taking Medication    ASSESSMENT:  Patient Problem List reviewed for relevant medical history and current medical status.    Current Diabetes Management per Patient:     Diabetes Medication(s)     Biguanides Sig    metFORMIN (GLUCOPHAGE-XR) 500 MG 24 hr tablet TAKE 2 TABLETS BY MOUTH TWICE DAILY WITH MEALS    Insulin Sig    insulin lispro (HUMALOG KWIKPEN) 100 UNIT/ML injection 35  units before breakfast, 35 units before lunch, 35  units before dinner    insulin degludec (TRESIBA) 100 UNIT/ML pen Inject 55 Units Subcutaneous daily    Incretin Mimetic Agents (GLP-1 Receptor Agonists) Sig    exenatide ER (BYDUREON) 2 MG pen Inject 2 mg Subcutaneous every 7 days        Is pleased with the Bydureon- makes him \"less hungry\". Pt confirmed that pt is no longer taking Januvia.     Typically uses 9-20 units of Humalog before meals, 35 units max. Adjusts based on anticipated activity level, BG if available, and size of meal.    Patient glucose self monitoring as follows: is using the Corwin continuous glucose monitor, is doing an avg of 2 scans per day.     Lab Results   Component Value Date    A1C 7.5 " "2018    A1C 9.6 2017    A1C 7.5 2017       Personal CGM download (past 2 weeks):        Ambulatory Glucose Profile (AGP) shows the followin. Consistent day-to-day patterns noted: pattern of post meal hyperglycemia.  2. Hypoglycemia (pt works nights and reports more likely to go low during that time)       Data evaluation:   Elevated SD of 75.1 due to inconsistencies with insulin dosing and missed injections. Pt would benefit from more frequent CGM scanning along with better medication adherence. Discussed the benefit of using I:C + correction dosing to provide more flexibility and less BG variability. Main barrier per pt is \"lack of time\"- short breaks at work, etc.     Education provided today:  AADE Self-Care Behaviors:  Healthy Eating: carbohydrate counting  Taking Medication: per huddle with Jasmin Arteaga NP-  discussed new ultra rapid acting insulin - Fiasp which may help reduce post meal hyperglycemia  Problem Solving: high blood glucose - causes, signs/symptoms, treatment and prevention, low blood glucose - causes, signs/symptoms, treatment and prevention and safety concerns    Opportunities for ongoing education and support in diabetes-self management were discussed.    Pt verbalized understanding of concepts discussed and recommendations provided today.       Educational and other materials:  Carbohydrate Counting and Insulin to Carbohydrate Ratios, Food/ Insulin logs, Medication Information on Fiasp (and savings card)    Plan:  1. Avoid missed insulin injections.   2. Keep a food/ insulin record for next visit. Will further discuss I:C + correction dosing at next vsiit.   3. Increase frequency of CGM scans. Include before/ after meals, bedtime, and when symptomatic of highs/lows. Avoid going more than 8 hrs     between scanning in order to capture all data.  4. Start Fiasp insulin in place of Humalog (per verbal approval by Jasmin Arteaga NP.     June Walker RD, CDE  Diabetes "     Time Spent: 40 minutes  Any diabetes medication dose changes were made via the CDE Protocol and Collaborative Practice Agreement with the patient's endocrinology provider. A copy of this encounter was shared with the provider.

## 2018-03-30 NOTE — PROGRESS NOTES
"Diabetes Self Management Training: Follow-up Visit    Crispin Rojas presents today for education and evaluation of glucose control related to Type 2 diabetes.    He is accompanied by self    Patient's diabetes management related comments/concerns: was in [place], eating was off, and missed several insulin injections, had started keeping a food and insulin record as recommended by Jasmin Arteaga NP at last visit- but states he \"forgot it in [place]\"    Patient would like this visit to be focused around the following diabetes-related behaviors and goals: Monitoring and Taking Medication    ASSESSMENT:  Patient Problem List reviewed for relevant medical history and current medical status.    Current Diabetes Management per Patient:     Diabetes Medication(s)     Biguanides Sig    metFORMIN (GLUCOPHAGE-XR) 500 MG 24 hr tablet TAKE 2 TABLETS BY MOUTH TWICE DAILY WITH MEALS    Insulin Sig    insulin lispro (HUMALOG KWIKPEN) 100 UNIT/ML injection 35  units before breakfast, 35 units before lunch, 35  units before dinner    insulin degludec (TRESIBA) 100 UNIT/ML pen Inject 55 Units Subcutaneous daily    Incretin Mimetic Agents (GLP-1 Receptor Agonists) Sig    exenatide ER (BYDUREON) 2 MG pen Inject 2 mg Subcutaneous every 7 days        Is pleased with the Bydureon- makes him \"less hungry\". Pt confirmed that pt is no longer taking Januvia.     Typically uses 9-20 units of Humalog before meals, 35 units max. Adjusts based on anticipated activity level, BG if available, and size of meal. Has difficulty taking 15 min before meals as previously recommended due to time constraints.     Patient glucose self monitoring as follows: is using the Corwin continuous glucose monitor, is doing an avg of 2 scans per day.       Lab Results   Component Value Date    A1C 7.5 03/20/2018    A1C 9.6 12/27/2017    A1C 7.5 03/31/2017       Personal CGM download (past 2 weeks):        Ambulatory Glucose Profile (AGP) shows the following: " "      1. Consistent day-to-day patterns noted: pattern of post meal hyperglycemia.  2. Hypoglycemia (pt works nights and reports more likely to go low during that time)       Data evaluation:   Elevated SD of 75.1 due to inconsistencies with insulin dosing and missed injections. Pt would benefit from more frequent CGM scanning along with better medication adherence. Discussed the benefit of using I:C + correction dosing to provide more flexibility and less BG variability. Main barrier per pt is \"lack of time\"- short breaks at work, etc.     Education provided today:  AADE Self-Care Behaviors:  Healthy Eating: carbohydrate counting - currently eating 15- 75 gms of carbs per day  Taking Medication: per huddle with Jasmin Arteaga NP-  discussed new ultra rapid acting insulin - Fiasp which may help reduce post meal hyperglycemia  Problem Solving: high blood glucose - causes, signs/symptoms, treatment and prevention, low blood glucose - causes, signs/symptoms, treatment and prevention and safety concerns    Opportunities for ongoing education and support in diabetes-self management were discussed.    Pt verbalized understanding of concepts discussed and recommendations provided today.       Educational and other materials:  Carbohydrate Counting and Insulin to Carbohydrate Ratios, Food/ Insulin logs, Medication Information on Fiasp (and savings card)    Plan:  1. Avoid missed insulin injections.   2. Keep a food/ insulin record for next visit. Will further discuss I:C + correction dosing at next vsiit.   3. Increase frequency of CGM scans. Include before/ after meals, bedtime, and when symptomatic of highs/lows. Avoid going more than 8 hrs     between scanning in order to capture all data.  4. Start Fiasp insulin in place of Humalog (per verbal approval by Jasmin Arteaga NP.     June Walker RD, CDE  Diabetes     Time Spent: 40 minutes  Any diabetes medication dose changes were made via the CDE Protocol " and Collaborative Practice Agreement with the patient's endocrinology provider. A copy of this encounter was shared with the provider.

## 2018-04-02 ENCOUNTER — TELEPHONE (OUTPATIENT)
Dept: ENDOCRINOLOGY | Facility: CLINIC | Age: 58
End: 2018-04-02

## 2018-04-02 DIAGNOSIS — E11.42 TYPE 2 DIABETES MELLITUS WITH PERIPHERAL NEUROPATHY (H): Primary | ICD-10-CM

## 2018-04-02 NOTE — TELEPHONE ENCOUNTER
Received a Drug Change Request from [facility] in [place].  Fiasp plextouch pen is not covered by pt's plan.  The preferred alternative is Humalog. The pharmacy is requesting that we call/fax the pharmacy to change medication.  [facility] - [phone]  Fax:  [phone].  LS - please advise. Shari Schoenberger, CMA

## 2018-04-03 NOTE — TELEPHONE ENCOUNTER
Please call and let Crispin know I changed Fiasp to Humalog.  Unfortunately, his insurance formulary changed and Novolog products are no longer covered, insurance now prefers Humalog.  Let him know that Humalog is working on an ultra fast acting insulin, like the Fiasp, it just isn't available yet but will be in the future.  Jasmin Arteaga NP  Endocrinology

## 2018-04-03 NOTE — TELEPHONE ENCOUNTER
Pt informed.  RX faxed to [facility] in [place]. Shari Schoenberger, CMA

## 2018-04-05 DIAGNOSIS — L21.9 SEBORRHEIC DERMATITIS: ICD-10-CM

## 2018-04-05 NOTE — TELEPHONE ENCOUNTER
"Last Written Prescription Date:  12/29/17  Last Fill Quantity: 120 mL,  # refills: 3   Last office visit: 3/20/2018 with prescribing provider:  Jessica   Future Office Visit:   Next 5 appointments (look out 90 days)     Apr 16, 2018  7:00 AM CDT   [facility]ino [facility]ina DPM   [facility]    [address] [id number] [phone]            Jun 22, 2018  8:30 AM CDT   Return Visit with SARAH Raymundo CNP   [facility]    [address] [facility] [address] [phone]                 Requested Prescriptions   Pending Prescriptions Disp Refills     ketoconazole (NIZORAL) 2 % shampoo [Pharmacy Med Name: KETOCONAZOLE 2% SHAMPOO 120ML] 120 mL 0     Sig: APPLY TO THE AFFECTED AREA AND WASH OFF AFTER 5 MINUTES.    Antifungal Agents Passed    4/5/2018  9:37 AM       Passed - Recent (12 mo) or future (30 days) visit within the authorizing provider's specialty    Patient had office visit in the last 12 months or has a visit in the next 30 days with authorizing provider or within the authorizing provider's specialty.  See \"Patient Info\" tab in inbasket, or \"Choose Columns\" in Meds & Orders section of the refill encounter.           Passed - Not Fluconazole or Terconazole     If oral Fluconazole or Terconazole, may refill if indicated in progress notes.             "

## 2018-04-06 RX ORDER — KETOCONAZOLE 20 MG/ML
SHAMPOO TOPICAL
Qty: 120 ML | Refills: 3 | Status: SHIPPED | OUTPATIENT
Start: 2018-04-06 | End: 2018-07-29

## 2018-04-06 NOTE — TELEPHONE ENCOUNTER
Routing refill request to provider for review/approval because:  Drug interaction warning  Javier Hendrickson RN, BSN

## 2018-04-17 ENCOUNTER — TELEPHONE (OUTPATIENT)
Dept: FAMILY MEDICINE | Facility: CLINIC | Age: 58
End: 2018-04-17

## 2018-04-17 DIAGNOSIS — E11.42 TYPE 2 DIABETES MELLITUS WITH PERIPHERAL NEUROPATHY (H): Primary | ICD-10-CM

## 2018-04-17 DIAGNOSIS — E66.01 MORBID OBESITY, UNSPECIFIED OBESITY TYPE (H): ICD-10-CM

## 2018-04-17 NOTE — TELEPHONE ENCOUNTER
A referral is recommended for health  in regards to diabetes/obesity/htn.  Referral written and sent to health  to contact patient.   IVAN Alaniz

## 2018-04-20 ENCOUNTER — ALLIED HEALTH/NURSE VISIT (OUTPATIENT)
Dept: EDUCATION SERVICES | Facility: CLINIC | Age: 58
End: 2018-04-20
Payer: COMMERCIAL

## 2018-04-20 DIAGNOSIS — E11.42 TYPE 2 DIABETES MELLITUS WITH PERIPHERAL NEUROPATHY (H): Primary | ICD-10-CM

## 2018-04-20 PROCEDURE — 95249 CONT GLUC MNTR PT PROV EQP: CPT | Performed by: DIETITIAN, REGISTERED

## 2018-04-20 NOTE — PROGRESS NOTES
".Diabetes Self Management Training: Follow-up and Personal Continuous Glucose Monitor Download    Crispin Rojas presents today for download and report review of patient-owned continuous glucose monitor device related to Type 2 diabetes.    He is accompanied by self    Patient's diabetes management related comments/concerns: ran out of Tresiba almost two weeks ago- picking up today. Also reports about half of his Bydureon pens didn't work- bringing them back to the pharmacy as well, reports \"blood sugars are high\"- has been sick with a cold.      Current Diabetes Management per Patient:     Diabetes Medication(s)     Biguanides Sig    metFORMIN (GLUCOPHAGE-XR) 500 MG 24 hr tablet TAKE 2 TABLETS BY MOUTH TWICE DAILY WITH MEALS    Insulin Sig    insulin degludec (TRESIBA) 100 UNIT/ML pen Inject 55 Units Subcutaneous daily    insulin lispro (HUMALOG KWIKPEN) 100 UNIT/ML injection Inject 35 units sq tid.  Dispense 90-day supply or as allowed by insurance.    Incretin Mimetic Agents (GLP-1 Receptor Agonists) Sig    exenatide ER (BYDUREON) 2 MG pen Inject 2 mg Subcutaneous every 7 days        Currently taking 35 units Humalog before each meal.     Most Recent A1c Result:    Lab Results   Component Value Date    A1C 7.5 2018       Personal Continuous Glucose Monitor Interpretation               1. Sensor data covers 14 days.  2. Average scans and/or glucose checks per day: 2 scans/day                        Data evaluation:     .  Ambulatory Glucose Profile (AGP) shows the followin. Consistent day-to-day patterns noted: BG drops during the night while working.  2. Hypoglycemia:      Assessment:  Recommend  work on medication adherence (avoid running out of medication) and more frequent BG monitoring. Pt reported barriers  \"too busy\" with work or travel.     INTERVENTION:    Education provided today on:  AADE Self-Care Behaviors  Problem Solving: low blood glucose - causes, signs/symptoms, treatment and " prevention and cautioned pt about safe travel- discussed low BG's likely caused byself adjustment of  Humalog while not taking Tresiba or Bydureon.     CGM-specific education:  CPA [employer] Corwin System: frequency of scanning to avoid break in BG data, review of CGM report    Pt verbalized understanding of concepts discussed and recommendations provided today.       Education Materials Provided:  No new materials provided today    PLAN:   Tresiba today, replace defective Bydureon pens. Avoid running out of medication.  Increase Corwin scans to better follow BG trends/ patterns (minimum q 8 hrs to capture all data)    FOLLOW-UP:  Follow-up with endocrinology recommended. Appointment June 22nd with Jasmin Arteaga NP.  Chart routed to referring provider.  Recommend CDE follow up 6 months.     June Walker RD, CDE  Diabetes     Time Spent: 30 minutes  Encounter Type: Individual    Any diabetes medication dose changes were made via the CDE Protocol and Collaborative Practice Agreement with the patient's endocrinology provider. A copy of this encounter was shared with the provider.

## 2018-04-20 NOTE — LETTER
"    2018         RE: Crispin Rojas  [id number] [address] [address]        Dear Colleague,    Thank you for referring your patient, Crispin Rojas, to the [facility]. Please see a copy of my visit note below.    .Diabetes Self Management Training: Follow-up and Personal Continuous Glucose Monitor Download    Crispin Rojas presents today for download and report review of patient-owned continuous glucose monitor device related to Type 2 diabetes.    He is accompanied by self    Patient's diabetes management related comments/concerns: ran out of Tresiba almost two weeks ago- picking up today. Also reports about half of his Bydureon pens didn't work- bringing them back to the pharmacy as well, reports \"blood sugars are high\"- has been sick with a cold.      Current Diabetes Management per Patient:     Diabetes Medication(s)     Biguanides Sig    metFORMIN (GLUCOPHAGE-XR) 500 MG 24 hr tablet TAKE 2 TABLETS BY MOUTH TWICE DAILY WITH MEALS    Insulin Sig    insulin degludec (TRESIBA) 100 UNIT/ML pen Inject 55 Units Subcutaneous daily    insulin lispro (HUMALOG KWIKPEN) 100 UNIT/ML injection Inject 35 units sq tid.  Dispense 90-day supply or as allowed by insurance.    Incretin Mimetic Agents (GLP-1 Receptor Agonists) Sig    exenatide ER (BYDUREON) 2 MG pen Inject 2 mg Subcutaneous every 7 days        Currently taking 35 units Humalog before each meal.     Most Recent A1c Result:    Lab Results   Component Value Date    A1C 7.5 2018       Personal Continuous Glucose Monitor Interpretation               1. Sensor data covers 14 days.  2. Average scans and/or glucose checks per day: 2 scans/day                        Data evaluation:     .  Ambulatory Glucose Profile (AGP) shows the followin. Consistent day-to-day patterns noted: BG drops during the night while working.  2. Hypoglycemia:      Assessment:  Recommend  work on medication adherence (avoid " "running out of medication) and more frequent BG monitoring. Pt reported barriers  \"too busy\" with work or travel.     INTERVENTION:    Education provided today on:  AADE Self-Care Behaviors  Problem Solving: low blood glucose - causes, signs/symptoms, treatment and prevention and cautioned pt about safe travel- discussed low BG's likely caused byself adjustment of  Humalog while not taking Tresiba or Bydureon.     CGM-specific education:  [employer] Corwin System: frequency of scanning to avoid break in BG data, review of CGM report    Pt verbalized understanding of concepts discussed and recommendations provided today.       Education Materials Provided:  No new materials provided today    PLAN:   Tresiba today, replace defective Bydureon pens. Avoid running out of medication.  Increase Corwin scans to better follow BG trends/ patterns (minimum q 8 hrs to capture all data)    FOLLOW-UP:  Follow-up with endocrinology recommended. Appointment June 22nd with Jasmin Arteaga NP.  Chart routed to referring provider.  Recommend CDE follow up 6 months.     June Walker RD, CDE  Diabetes     Time Spent: 30 minutes  Encounter Type: Individual    Any diabetes medication dose changes were made via the CDE Protocol and Collaborative Practice Agreement with the patient's endocrinology provider. A copy of this encounter was shared with the provider.      "

## 2018-04-20 NOTE — MR AVS SNAPSHOT
After Visit Summary   4/20/2018    Crispin Rojas    MRN: [id number]           Patient Information     Date Of Birth          1960        Visit Information        Provider Department      4/20/2018 8:30 AM June Walker [facility]        Today's Diagnoses     Type 2 diabetes mellitus with peripheral neuropathy (H)    -  1       Follow-ups after your visit        Your next 10 appointments already scheduled     Jun 22, 2018  8:15 AM CDT   LAB with CR LAB   [facility]    [address] [place] [id number] [phone]           Please do not eat 10-12 hours before your appointment if you are coming in fasting for labs on lipids, cholesterol, or glucose (sugar). This does not apply to pregnant women. Water, hot tea and black coffee (with nothing added) are okay. Do not drink other fluids, diet soda or chew gum.            Jun 22, 2018  8:30 AM CDT   Return Visit with SARAH Raymundo CNP   [facility] ([facility]    [id number] [place] [id number] [phone]              Who to contact     If you have questions or need follow up information about today's clinic visit or your schedule please contact [facility] directly at [phone].  Normal or non-critical lab and imaging results will be communicated to you by MyChart, letter or phone within 4 business days after the clinic has received the results. If you do not hear from us within 7 days, please contact the clinic through MyChart or phone. If you have a critical or abnormal lab result, we will notify you by phone as soon as possible.  Submit refill requests through [employer] or call your pharmacy and they will forward the refill request to us. Please allow 3 business days for your refill to be completed.          Additional Information About Your Visit         Offerial Information     Offerial gives you secure access to your electronic health record. If you see a primary care provider, you can also send messages to your care team and make appointments. If you have questions, please call your primary care clinic.  If you do not have a primary care provider, please call [phone] and they will assist you.        Care EveryWhere ID     This is your Care EveryWhere ID. This could be used by other organizations to access your [facility] medical records  [id number]         Blood Pressure from Last 3 Encounters:   03/20/18 160/85   03/12/18 136/74   03/02/18 132/80    Weight from Last 3 Encounters:   03/20/18 116.6 kg (257 lb 1.6 oz)   03/12/18 119 kg (262 lb 6.4 oz)   03/02/18 114.7 kg (252 lb 12.8 oz)              We Performed the Following     C CONT GLUC MONITOR, 72 HRS OR MORE, PATIENT PROVIDED EQUIPTMENT        Primary Care Provider Office Phone # Fax #    Aden Ethan Lopez [phone] [facility] [id number]        Equal Access to Services     [facility]: Hadii aad ku hadasho Soomaali, waaxda luqadaha, qaybta kaalmada adeegyada, blanca moran . So [facility] [phone].    ATENCIÓN: Si habla español, tiene a gomez disposición servicios gratuitos de asistencia lingüística. Llame al [phone].    We comply with applicable federal civil rights laws and [place] laws. We do not discriminate on the basis of race, color, national origin, age, disability, sex, sexual orientation, or gender identity.            Thank you!     Thank you for choosing [facility]  for your care. Our goal is always to provide you with excellent care. Hearing back from our patients is one way we can continue to improve our services. Please take a few minutes to complete the written survey that you may receive in the mail after your visit with us. Thank you!             Your Updated Medication List  "- Protect others around you: Learn how to safely use, store and throw away your medicines at www.disposemymeds.org.          This list is accurate as of 4/20/18 10:06 AM.  Always use your most recent med list.                   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 Use one syringe  daily or as directed.    Type 2 diabetes, HbA1C goal < 8% (H)       blood glucose lancets standard    no brand specified    300 each    Use to test blood sugar 3 times daily or as directed.    Type 2 diabetes mellitus with diabetic neuropathy, with long-term current use of insulin (H)       blood glucose monitoring meter device kit    no brand specified    1 kit    Use to test blood sugar 3 times daily or as directed.    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)       * CIALIS 5 MG tablet   Generic drug:  tadalafil     30 tablet    TAKE 1 TABLET BY MOUTH EVERY DAY AS NEEDED    Erectile dysfunction due to diseases classified elsewhere       * CIALIS 5 MG tablet   Generic drug:  tadalafil     30 tablet    TAKE 1 TABLET BY MOUTH EVERY DAY AS NEEDED    Erectile dysfunction due to diseases classified elsewhere       Co-Enzyme Q10 100 MG Caps      Take 100 mg by mouth daily        continuous blood glucose monitoring sensor     3 each    For use with Freestyle Corwin Flash "  for continuous monitioring of blood glucose levels. Replace sensor every 10 days.    Type 2 diabetes mellitus with diabetic peripheral angiopathy and gangrene, with long-term current use of insulin (H)       exenatide ER 2 MG pen    BYDUREON    4 kit    Inject 2 mg Subcutaneous every 7 days    Type 2 diabetes mellitus with peripheral neuropathy (H)       FREESTYLE LUCERO READER Iman     1 Device    1 Device as needed    Type 2 diabetes mellitus with diabetic peripheral angiopathy and gangrene, with long-term current use of insulin (H)       gabapentin 100 MG capsule    NEURONTIN     Take 300 mg by mouth At Bedtime        insulin degludec 100 UNIT/ML pen    TRESIBA    30 mL    Inject 55 Units Subcutaneous daily    Type 2 diabetes mellitus with diabetic peripheral angiopathy and gangrene, with long-term current use of insulin (H)       insulin lispro 100 UNIT/ML injection    HumaLOG KWIKpen    30 mL    Inject 35 units sq tid.  Dispense 90-day supply or as allowed by insurance.    Type 2 diabetes mellitus with peripheral neuropathy (H)       ketoconazole 2 % shampoo    NIZORAL    120 mL    APPLY TO THE AFFECTED AREA AND WASH OFF AFTER 5 MINUTES.    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)       MULTIVITAMIN PO      Take 1 tablet by mouth daily        OMEGA-3 FISH OIL PO      Take 2 g by mouth daily        Pen Needles 31G X 5 MM Misc     200 each    1 Device 5 times daily , TWICE DAILY WITH LANTUS AND 3 TIMES A DAY PRN WITH NOVOLOG FLEXPEN    Type 2 diabetes mellitus with peripheral neuropathy (H)       thin lancets    NO BRAND SPECIFIED    300 each    1 each 3 times daily.    Type 2 diabetes, HbA1C goal < 8% (H)        VITAMIN D3 PO      Take 1,000 Units by mouth daily        * Notice:  This list has 2 medication(s) that are the same as other medications prescribed for you. Read the directions carefully, and ask your doctor or other care provider to review them with you.

## 2018-05-01 DIAGNOSIS — G45.8 OTHER SPECIFIED TRANSIENT CEREBRAL ISCHEMIAS: ICD-10-CM

## 2018-05-01 RX ORDER — AMLODIPINE BESYLATE 5 MG/1
TABLET ORAL
Qty: 90 TABLET | Refills: 0 | Status: SHIPPED | OUTPATIENT
Start: 2018-05-01 | End: 2018-08-04

## 2018-05-01 NOTE — TELEPHONE ENCOUNTER
"Last Written Prescription Date:  2/06/18  Last Fill Quantity: 90 tablet,  # refills: 0   Last office visit: 3/20/2018 with prescribing provider:  Jessica   Future Office Visit:   Next 5 appointments (look out 90 days)     Jun 22, 2018  8:30 AM CDT   [facility]    [address] [facility] [address] [phone]                 Notes to Pharmacy: Needs office visit with Dr. Lopez    Requested Prescriptions   Pending Prescriptions Disp Refills     amLODIPine (NORVASC) 5 MG tablet [Pharmacy Med Name: AMLODIPINE BESYLATE 5MG TABLETS] 90 tablet 0     Sig: TAKE 1 TABLET BY MOUTH EVERY DAY    Calcium Channel Blockers Protocol  Failed    5/1/2018 11:46 AM       Failed - Blood pressure under 140/90 in past 12 months    BP Readings from Last 3 Encounters:   03/20/18 160/85   03/12/18 136/74   03/02/18 132/80                Passed - Recent (12 mo) or future (30 days) visit within the authorizing provider's specialty    Patient had office visit in the last 12 months or has a visit in the next 30 days with authorizing provider or within the authorizing provider's specialty.  See \"Patient Info\" tab in inbasket, or \"Choose Columns\" in Meds & Orders section of the refill encounter.           Passed - Patient is age 18 or older       Passed - Normal serum creatinine on file in past 12 months    Recent Labs   Lab Test  12/27/17   1001   CR  0.83               "

## 2018-05-09 ENCOUNTER — OFFICE VISIT (OUTPATIENT)
Dept: FAMILY MEDICINE | Facility: CLINIC | Age: 58
End: 2018-05-09
Payer: COMMERCIAL

## 2018-05-09 VITALS
SYSTOLIC BLOOD PRESSURE: 132 MMHG | HEART RATE: 60 BPM | DIASTOLIC BLOOD PRESSURE: 70 MMHG | BODY MASS INDEX: 36.05 KG/M2 | WEIGHT: 255.6 LBS | TEMPERATURE: 98.2 F | RESPIRATION RATE: 12 BRPM

## 2018-05-09 DIAGNOSIS — E11.42 TYPE 2 DIABETES MELLITUS WITH PERIPHERAL NEUROPATHY (H): ICD-10-CM

## 2018-05-09 DIAGNOSIS — E66.01 MORBID OBESITY, UNSPECIFIED OBESITY TYPE (H): Primary | ICD-10-CM

## 2018-05-09 DIAGNOSIS — A08.4 VIRAL GASTROENTERITIS: ICD-10-CM

## 2018-05-09 PROCEDURE — 99213 OFFICE O/P EST LOW 20 MIN: CPT | Performed by: FAMILY MEDICINE

## 2018-05-09 ASSESSMENT — PATIENT HEALTH QUESTIONNAIRE - PHQ9: 5. POOR APPETITE OR OVEREATING: NOT AT ALL

## 2018-05-09 ASSESSMENT — ANXIETY QUESTIONNAIRES
7. FEELING AFRAID AS IF SOMETHING AWFUL MIGHT HAPPEN: NOT AT ALL
6. BECOMING EASILY ANNOYED OR IRRITABLE: NOT AT ALL
3. WORRYING TOO MUCH ABOUT DIFFERENT THINGS: NOT AT ALL
IF YOU CHECKED OFF ANY PROBLEMS ON THIS QUESTIONNAIRE, HOW DIFFICULT HAVE THESE PROBLEMS MADE IT FOR YOU TO DO YOUR WORK, TAKE CARE OF THINGS AT HOME, OR GET ALONG WITH OTHER PEOPLE: NOT DIFFICULT AT ALL
2. NOT BEING ABLE TO STOP OR CONTROL WORRYING: NOT AT ALL
5. BEING SO RESTLESS THAT IT IS HARD TO SIT STILL: NOT AT ALL
1. FEELING NERVOUS, ANXIOUS, OR ON EDGE: NOT AT ALL
GAD7 TOTAL SCORE: 0

## 2018-05-09 NOTE — PROGRESS NOTES
SUBJECTIVE:   Crispin Rojas is a 58 year old male who presents to clinic today for the following health issues:      Diarrhea  (S) Crispin Rojas is a 58 year old male with complaints of gastrointestinal symptoms of diarrhea for 2 days. No blood in stool.    (O) Physical exam reveals the patient appears well. Hydration status: well hydrated. Abdomen: abdomen is soft without significant tenderness, masses, organomegaly or guarding, no CVA tenderness, no inguinal nodes, no herniae noted, no masses noted.    (A) Viral Gastroenteritis    (P) I have recommended small amounts clear fluids frequently, soups, juices, water and advance diet as tolerated. Return office visit if symptoms persist or worsen; I have alerted the patient to call if high fever, dehydration, marked weakness, fainting, increased abdominal pain, blood in stool or vomit.        Duration: 2 days    Description:       Consistency of stool: runny       Blood in stool: no        Number of loose stools past 24 hours: 7    Intensity:  severe    Accompanying signs and symptoms:       Fever: YES- first night       Nausea/vomitting: no        Abdominal pain: no        Weight loss: no     History (recent antibiotics or travel/ill contacts/med changes/testing done): none    Precipitating or alleviating factors: maybe eating chicken wings    Therapies tried and outcome: none    (E66.01) Morbid obesity, unspecified obesity type (H)  (primary encounter diagnosis)  Comment:   Plan: cover his sugars cautiously with low residue diet     (E11.42) Type 2 diabetes mellitus with peripheral neuropathy (H)  Comment:   Plan:     (A08.4) Viral gastroenteritis  Comment:   Plan: denisse

## 2018-05-09 NOTE — PATIENT INSTRUCTIONS
Clear liquids until stools start to firm    Reduce insulin radically to prevent low sugar -especially humalog    Then soft, then cooked and soft meat, dairy and veggies last

## 2018-05-09 NOTE — MR AVS SNAPSHOT
After Visit Summary   5/9/2018    Crispin Rojas    MRN: [id number]           Patient Information     Date Of Birth          1960        Visit Information        Provider Department      5/9/2018 4:15 PM Aden Lopez MD [facility]        Care Instructions    Clear liquids until stools start to firm    Reduce insulin radically to prevent low sugar -especially humalog    Then soft, then cooked and soft meat, dairy and veggies last           Follow-ups after your visit        Your next 10 appointments already scheduled     Jun 22, 2018  8:15 AM CDT   LAB with [facility]   [facility]    [id number] [place] [id number] [phone]           Please do not eat 10-12 hours before your appointment if you are coming in fasting for labs on lipids, cholesterol, or glucose (sugar). This does not apply to pregnant women. Water, hot tea and black coffee (with nothing added) are okay. Do not drink other fluids, diet soda or chew gum.            Jun 22, 2018  8:30 AM CDT   Return Visit with SARAH Raymundo CNP   [facility]    [id number] [place] [id number] [phone]              Who to contact     If you have questions or need follow up information about today's clinic visit or your schedule please contact [facility] directly at [phone].  Normal or non-critical lab and imaging results will be communicated to you by MyChart, letter or phone within 4 business days after the clinic has received the results. If you do not hear from us within 7 days, please contact the clinic through MyChart or phone. If you have a critical or abnormal lab result, we will notify you by phone as soon as possible.  Submit refill requests through [employer] or call your pharmacy and they will forward the refill request to us. Please allow  3 business days for your refill to be completed.          Additional Information About Your Visit        MyChart Information     [employer] gives you secure access to your electronic health record. If you see a primary care provider, you can also send messages to your care team and make appointments. If you have questions, please call your primary care clinic.  If you do not have a primary care provider, please call [phone] and they will assist you.        Care EveryWhere ID     This is your Care EveryWhere ID. This could be used by other organizations to access your [place] medical records  [id number]        Your Vitals Were     Pulse Temperature Respirations BMI (Body Mass Index)          60 98.2  F (36.8  C) (Oral) 12 36.05 kg/m2         Blood Pressure from Last 3 Encounters:   05/09/18 132/70   03/20/18 160/85   03/12/18 136/74    Weight from Last 3 Encounters:   05/09/18 255 lb 9.6 oz (115.9 kg)   03/20/18 257 lb 1.6 oz (116.6 kg)   03/12/18 262 lb 6.4 oz (119 kg)              Today, you had the following     No orders found for display       Primary Care Provider Office Phone # Fax #    Aden tEhan Lopez [phone] [address] [facility] [address]        Equal Access to Services     ELI MARTÍNEZ AH: Hadii geraldine ku hadasho Soomaali, waaxda luqadaha, qaybta kaalmada adeegyada, blanca randlein haymistyn sabina rhodes. So [facility] [phone].    ATENCIÓN: Si habla español, tiene a gomez disposición servicios gratuitos de asistencia lingüística. Llame al [phone].    We comply with applicable federal civil rights laws and [place] laws. We do not discriminate on the basis of race, color, national origin, age, disability, sex, sexual orientation, or gender identity.            Thank you!     Thank you for choosing [facility]  for your care. Our goal is always to provide you with excellent care. Hearing back from our patients is one way we can continue to  "improve our services. Please take a few minutes to complete the written survey that you may receive in the mail after your visit with us. Thank you!             Your Updated Medication List - Protect others around you: Learn how to safely use, store and throw away your medicines at www.disposemymeds.org.          This list is accurate as of 5/9/18  4:59 PM.  Always use your most recent med list.                   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 Use one syringe  daily or as directed.    Type 2 diabetes, HbA1C goal < 8% (H)       blood glucose lancets standard    no brand specified    300 each    Use to test blood sugar 3 times daily or as directed.    Type 2 diabetes mellitus with diabetic neuropathy, with long-term current use of insulin (H)       blood glucose monitoring meter device kit    no brand specified    1 kit    Use to test blood sugar 3 times daily or as directed.    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)       * CIALIS 5 MG tablet   Generic drug:  tadalafil     30 tablet    TAKE 1 TABLET BY MOUTH EVERY DAY AS NEEDED    Erectile dysfunction due to diseases classified elsewhere       * CIALIS 5 MG tablet   Generic drug:  tadalafil     30 tablet    TAKE 1 TABLET BY MOUTH EVERY DAY AS NEEDED    Erectile dysfunction due to " diseases classified elsewhere       Co-Enzyme Q10 100 MG Caps      Take 100 mg by mouth daily        continuous blood glucose monitoring sensor     3 each    For use with Freestyle Corwin Flash  for continuous monitioring of blood glucose levels. Replace sensor every 10 days.    Type 2 diabetes mellitus with diabetic peripheral angiopathy and gangrene, with long-term current use of insulin (H)       exenatide ER 2 MG pen    BYDUREON    4 kit    Inject 2 mg Subcutaneous every 7 days    Type 2 diabetes mellitus with peripheral neuropathy (H)       FREESTYLE CORWIN READER Iman     1 Device    1 Device as needed    Type 2 diabetes mellitus with diabetic peripheral angiopathy and gangrene, with long-term current use of insulin (H)       gabapentin 100 MG capsule    NEURONTIN     Take 300 mg by mouth At Bedtime        insulin degludec 100 UNIT/ML pen    TRESIBA    30 mL    Inject 55 Units Subcutaneous daily    Type 2 diabetes mellitus with diabetic peripheral angiopathy and gangrene, with long-term current use of insulin (H)       insulin lispro 100 UNIT/ML injection    HumaLOG KWIKpen    30 mL    Inject 35 units sq tid.  Dispense 90-day supply or as allowed by insurance.    Type 2 diabetes mellitus with peripheral neuropathy (H)       ketoconazole 2 % shampoo    NIZORAL    120 mL    APPLY TO THE AFFECTED AREA AND WASH OFF AFTER 5 MINUTES.    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)       MULTIVITAMIN PO      Take 1 tablet by mouth daily        OMEGA-3 FISH OIL PO      Take 2 g by mouth daily        Pen Needles 31G X 5 MM Misc     200 each    1 Device 5 times daily , TWICE DAILY WITH LANTUS AND 3 TIMES A DAY PRN WITH NOVOLOG  FLEXPEN    Type 2 diabetes mellitus with peripheral neuropathy (H)       thin lancets    NO BRAND SPECIFIED    300 each    1 each 3 times daily.    Type 2 diabetes, HbA1C goal < 8% (H)       VITAMIN D3 PO      Take 1,000 Units by mouth daily        * Notice:  This list has 2 medication(s) that are the same as other medications prescribed for you. Read the directions carefully, and ask your doctor or other care provider to review them with you.

## 2018-05-10 ASSESSMENT — ANXIETY QUESTIONNAIRES: GAD7 TOTAL SCORE: 0

## 2018-05-10 ASSESSMENT — PATIENT HEALTH QUESTIONNAIRE - PHQ9: SUM OF ALL RESPONSES TO PHQ QUESTIONS 1-9: 0

## 2018-05-20 DIAGNOSIS — E11.49 DIABETES MELLITUS TYPE 2 WITH NEUROLOGICAL MANIFESTATIONS (H): ICD-10-CM

## 2018-05-20 DIAGNOSIS — E78.5 HYPERLIPIDEMIA LDL GOAL <100: ICD-10-CM

## 2018-05-21 ENCOUNTER — TRANSFERRED RECORDS (OUTPATIENT)
Dept: HEALTH INFORMATION MANAGEMENT | Facility: CLINIC | Age: 58
End: 2018-05-21

## 2018-05-21 ENCOUNTER — MEDICAL CORRESPONDENCE (OUTPATIENT)
Dept: HEALTH INFORMATION MANAGEMENT | Facility: CLINIC | Age: 58
End: 2018-05-21

## 2018-05-21 NOTE — TELEPHONE ENCOUNTER
"Requested Prescriptions   Pending Prescriptions Disp Refills     atorvastatin (LIPITOR) 40 MG tablet [Pharmacy Med Name: ATORVASTATIN 40MG TABLETS] 90 tablet 0    Last Written Prescription Date:  11/27/2017  Last Fill Quantity: 90 tablet,  # refills: 1   Last office visit: 5/9/2018 with prescribing provider:  05/09/2018   Future Office Visit:   Next 5 appointments (look out 90 days)     Jun 07, 2018  8:00 AM CDT   [facility] with SARAH [facility]mundo CNP   [facility]    [address] [facility] [address]   [phone]                  Sig: TAKE 1 TABLET BY MOUTH EVERY DAY    Statins Protocol Passed    5/20/2018  5:42 PM       Passed - LDL on file in past 12 months    Recent Labs   Lab Test  12/27/17   1001   LDL  37            Passed - No abnormal creatine kinase in past 12 months    No lab results found.            Passed - Recent (12 mo) or future (30 days) visit within the authorizing provider's specialty    Patient had office visit in the last 12 months or has a visit in the next 30 days with authorizing provider or within the authorizing provider's specialty.  See \"Patient Info\" tab in inbasket, or \"Choose Columns\" in Meds & Orders section of the refill encounter.           Passed - Patient is age 18 or older          Cr MARTÍNEZ  "

## 2018-05-23 RX ORDER — ATORVASTATIN CALCIUM 40 MG/1
TABLET, FILM COATED ORAL
Qty: 90 TABLET | Refills: 2 | Status: SHIPPED | OUTPATIENT
Start: 2018-05-23 | End: 2019-03-06

## 2018-05-23 NOTE — TELEPHONE ENCOUNTER
Prescription approved per [facility] Refill Protocol.  June Thomas RN, BSN

## 2018-05-29 DIAGNOSIS — I69.90 LATE EFFECTS OF CEREBROVASCULAR DISEASE: ICD-10-CM

## 2018-05-29 DIAGNOSIS — H92.09 EAR PAIN: Primary | ICD-10-CM

## 2018-05-29 DIAGNOSIS — M54.81 OCCIPITAL NEURALGIA OF LEFT SIDE: ICD-10-CM

## 2018-05-31 ENCOUNTER — TELEPHONE (OUTPATIENT)
Dept: NURSING | Facility: CLINIC | Age: 58
End: 2018-05-31

## 2018-05-31 NOTE — TELEPHONE ENCOUNTER
Received Health Coaching Referral-Outreached patient for the first time, and was able to connect.  Patient agrees to coaching and scheduled our first coaching session for 6/14/2018.    [facility]    5/31/2018    3:28 PM

## 2018-06-07 ENCOUNTER — OFFICE VISIT (OUTPATIENT)
Dept: ENDOCRINOLOGY | Facility: CLINIC | Age: 58
End: 2018-06-07
Payer: COMMERCIAL

## 2018-06-07 VITALS
DIASTOLIC BLOOD PRESSURE: 68 MMHG | TEMPERATURE: 97.9 F | SYSTOLIC BLOOD PRESSURE: 125 MMHG | WEIGHT: 250.9 LBS | HEART RATE: 56 BPM | OXYGEN SATURATION: 96 % | BODY MASS INDEX: 35.39 KG/M2

## 2018-06-07 DIAGNOSIS — I10 BENIGN ESSENTIAL HYPERTENSION: ICD-10-CM

## 2018-06-07 DIAGNOSIS — E11.42 TYPE 2 DIABETES MELLITUS WITH PERIPHERAL NEUROPATHY (H): Primary | ICD-10-CM

## 2018-06-07 DIAGNOSIS — E78.5 HYPERLIPIDEMIA LDL GOAL <100: ICD-10-CM

## 2018-06-07 PROCEDURE — 99214 OFFICE O/P EST MOD 30 MIN: CPT | Performed by: CLINICAL NURSE SPECIALIST

## 2018-06-07 NOTE — LETTER
6/7/2018         RE: Crispin Rojas  [address] [address] [address]        Dear Colleague,    Thank you for referring your patient, Crispin Rojas, to the [facility]. Please see a copy of my visit note below.    Name: Crispin Rojas  Seen today for Diabetes (Last seen 3/2/2018).  HPI:  Crispin Rojas is a 58 year old male who presents for the evaluation/management of:    1. Type 2 DM:  Originally diagnosed at the age of: 1997.  Insulin treated 15+ years.    Current Regimen: Metformin 1000 mg bid, Bydureon 2 mg weekly, Tresiba 55 units qd, Humalog 35 units (full meal) or 17 units for a snack tid + 2:50>150.    Started Bydureon about 1 month ago - tolerating well.  He feels the Bydureon has helped improve his blood sugars significantly.  Using personal CGM - noting BG spikes after meals but eventually decreases back to target range.  Only eats 1-2 meals per day, still takes Humalog tid - lower dose if eating a snack instead of a full meal, correction dose only if not eating.    BS checks: 3-5 times per day, + Corwin  Meter Download: forgot meter  Corwin CGM download: Did not bring Corwin .    Right foot ulcer developed in September, blood sugars have been high since that time   History of foot ulcers - Avid golfer, gets blisters from golf shoes, + history of osteomyelitis    Working with diabetes ed - last seen by June Walker 4/20/2018.    Taking gabapenin per neurology for ear/nerve problem.    Complications:   Diabetes Complications  Description / Detail    Diabetic Retinopathy   + diabetic retinopathy, next appointment with retinal specialist 6/21, last laser treatment 10/2016  Regular opthalmoologist visit 3/2018   CAD / PAD  No   Neuropathy   Yes, sees Dr. Molina, last seen 2/12/2018 - no open foot lesions at this time.   Nephropathy / Microalbuminuria  Yes, elevated microalbumin   Gastroparesis  No   Hypoglycemia Unawareness  No       2. Hypertension: Blood  Pressure today:   BP Readings from Last 3 Encounters:   06/07/18 125/68   05/09/18 132/70   03/20/18 160/85   .  Blood pressure medications include Amlodipine 5 mg qd, lisinopril 20 mg qd.  Takes medications everyday without forgetting a dose.  Denies feeling lightheaded or dizzy.  3. Hyperlipidemia: Takes Atorvastatin 40 mg qd for lipid control.  Denies muscle aches of pains.       4. Prevention:  Flu Shot-   Pneumovax- 11/2015  Opthalmology-annaully  Dental-recommend semiannually  ASA-  Smoking-   PMH/PSH:  Past Medical History:   Diagnosis Date     Cerebral infarction (H)      Chronic infection      H/O heartburn      History of heartburn      Hypertension      Numbness and tingling      Obesity      Renal stone      Type II or unspecified type diabetes mellitus without mention of complication, not stated as uncontrolled      Past Surgical History:   Procedure Laterality Date     AMPUTATE FOOT Left 8/18/2016    Procedure: AMPUTATE FOOT;  Surgeon: Jack Younger DPM;  Location: [facility] OR     AMPUTATE TOE(S) Right 2/1/2016    Procedure: AMPUTATE TOE(S);  Surgeon: Rachelle Manriquez DPM, Pod;  Location: [facility] OR     ANGIOGRAM       COLONOSCOPY       COMBINED CYSTOSCOPY, RETROGRADES, URETEROSCOPY, INSERT STENT Left 8/21/2016    Procedure: COMBINED CYSTOSCOPY, RETROGRADES, URETEROSCOPY, INSERT STENT;  Surgeon: Artemio Valenzuela MD;  Location: [facility] OR     COMBINED CYSTOSCOPY, RETROGRADES, URETEROSCOPY, LASER HOLMIUM LITHOTRIPSY URETER(S), INSERT STENT Left 10/3/2016    Procedure: COMBINED CYSTOSCOPY, RETROGRADES, URETEROSCOPY, LASER HOLMIUM LITHOTRIPSY URETER(S), INSERT STENT;  Surgeon: Artemio Valenzuela MD;  Location: [facility] OR     EXTRACORPOREAL SHOCK WAVE LITHOTRIPSY (ESWL) Left 9/8/2016    Procedure: EXTRACORPOREAL SHOCK WAVE LITHOTRIPSY (ESWL);  Surgeon: Artemio Valenzuela MD;  Location: [facility] OR     RECESSION GASTROCNEMIUS Right 2/1/2016    Procedure: RECESSION GASTROCNEMIUS;  Surgeon: Rachelle Manriquez DPM,  Pod;  Location: [facility] OR     Family Hx:  Family History   Problem Relation Age of Onset     Arthritis Mother      SLE     Connective Tissue Disorder Mother      lupus     DIABETES Mother      CEREBROVASCULAR DISEASE Mother      CANCER Mother      DIABETES Father      DIABETES Maternal Grandfather      DIABETES Sister      Thyroid disease:          DM2: Yes: Strong family history on mothers side - mom, 7 maternal uncles, two maternal aunts, + MGF.         Autoimmune: DM1, SLE, RA, Vitiligo Yes: Lupus    Social Hx:  Social History     Social History     Marital status:      Spouse name: Sydney     Number of children: 2     Years of education: N/A     Occupational History     marketing [employer]     Social History Main Topics     Smoking status: Never Smoker     Smokeless tobacco: Never Used     Alcohol use 0.0 oz/week     0 Standard drinks or equivalent per week      Comment: rare---red wine 2x per week     Drug use: No     Sexual activity: Yes     Partners: Female     Other Topics Concern     Parent/Sibling W/ Cabg, Mi Or Angioplasty Before 65f 55m? No     Social History Narrative          MEDICATIONS:  has a current medication list which includes the following prescription(s): amlodipine, atorvastatin, blood glucose, blood glucose monitoring, blood glucose monitoring, cholecalciferol, cialis, cialis, co-enzyme q10, freestyle lilly reader, continuous blood glucose monitoring, exenatide er, gabapentin, insulin degludec, insulin lispro, insulin lispro, pen needles, bd insulin syringe ultrafine, ketoconazole, thin, lisinopril, metformin, multiple vitamins-minerals, omega-3 fatty acids, and semaglutide.    ROS     ROS: 10 point ROS neg other than the symptoms noted above in the HPI.    Physical Exam   VS: /68 (BP Location: Left arm, Patient Position: Chair, Cuff Size: Adult Large)  Pulse 56  Temp 97.9  F (36.6  C) (Oral)  Wt 113.8 kg (250 lb 14.4 oz)  SpO2 96%  BMI 35.39 kg/m2  GENERAL:  NAD, well dressed, answering questions appropriately, appears stated age.  HEENT: no exopthalmous, no proptosis, no lig lag, no retraction, no scleral icterus  RESPIRATORY: Clear bilaterally.  Normal respiratory effort.  CARDIOVASCULAR: RRR.  No peripheral edema.  EXTREMITIES: no edema, feet with diminished plantar sensation right foot and absent plantar sensation left foot, prior great toe amputations, complete on the left, partial on the right, no open lesions.  NEUROLOGY: CN grossly intact, no tremors  MSK: grossly intact, No digital cyanosis. Normal gait and station.  SKIN: no rashes, no lesions, no ulcers  PSYCH: Intact judgment and insight. A&OX3 with a cordial affect.    LABS:  A1c:   Component    Latest Ref Rng & Units 3/31/2017 12/27/2017 3/20/2018   Hemoglobin A1C    4.3 - 6.0 % 7.5 (H) 9.6 (H) 7.5 (H)     Basic Metabolic Panel:  !COMPREHENSIVE Latest Ref Rng & Units 12/27/2017   SODIUM 133 - 144 mmol/L 139   POTASSIUM 3.4 - 5.3 mmol/L 4.4   CHLORIDE 94 - 109 mmol/L 105   CO2 mmol/L    BUN 7 - 30 mg/dL 20   Creatinine 0.66 - 1.25 mg/dL    Creatinine 0.66 - 1.25 mg/dL 0.83   Glucose 70 - 99 mg/dL 255 (H)   ANION GAP 3 - 14 mmol/L 8   CALCIUM 8.5 - 10.1 mg/dL 8.3 (L)     LFTS/Cholesterol Panel:  !LIPID/HEPATIC Latest Ref Rng & Units 12/27/2017   CHOLESTEROL <200 mg/dL 96   TRIGLYCERIDES <150 mg/dL 167 (H)   HDL CHOLESTEROL >39 mg/dL 26 (L)   LDL CHOLESTEROL DIRECT 0 - 129 mg/dL    LDL CHOLESTEROL, CALCULATED <100 mg/dL 37   VLDL-CHOLESTEROL 0 - 30 mg/dL    NON HDL CHOLESTEROL <130 mg/dL 70   CHOLESTEROL/HDL RATIO 0.0 - 5.0    AST 0 - 45 U/L 40   ALT 0 - 70 U/L 53     Thyroid Function:   !THYROID Latest Ref Rng & Units 12/27/2017   TSH 0.40 - 4.00 mU/L 1.78     Urine MicroAlbumin:  Component    Latest Ref Rng & Units 12/27/2017   Creatinine Urine    mg/dL 95   Albumin Urine mg/L    mg/L 23   Albumin Urine mg/g Cr    0 - 17 mg/g Cr 24.55 (H)     Vitamin D:    All pertinent notes, labs, and images personally  reviewed by me.     A/P  Mr.Patrick AKASH Rojas is a 58 year old here for the evaluation/management of diabetes:    1. DM2 - Uncontrolled, improved.  Trial of Admelog in place of Humalog if covered by insurance, continue same dose as Humalog.  He can take the Ademlog right before or up to 20 minutes after he starts eating.  D/c Bydureon.   Start Ozempic 1 mg weekly.  $25 copay card provided for 5 month trial.  If better tolerated and better blood sugar control, will see if a PA can be approved.  Alternately, could consider changing to Trulicity if better insurance coverage.  Continue Metformin 1000 mg bid, Tresiba 55 units qd.  Schedule lab appointment for A1c 6/22.  There is some variability among people, most will usually develop symptoms suggestive of hypoglycemia when blood glucose levels are lowered to the mid 60's. The first set of symptoms are called adrenergic. Patients may experience any of the following nervousness, sweating, intense hunger, trembling, weakness, palpitations, and difficulty speaking.   The acute management of hypoglycemia involves the rapid delivery of a source of easily absorbed sugar. Regular soda, juice, lifesavers, table sugar, are good options. 15 grams of glucose is the dose that is given, followed by an assessment of symptoms and a blood glucose check if possible. If after 10 minutes there is no improvement, another 10-15 grams should be given. This can be repeated up to three times. The equivalency of 10-15 grams of glucose (approximate servings) are: 3-5 hard candies, 3 teaspoons of sugar, or 1/2 cup of regular soda or juice.      2. Hypertension - Controlled.    3. Hyperlipidemia - On statin therapy    Labs ordered today:   Orders Placed This Encounter   Procedures     **A1C FUTURE 3mo       Radiology/Consults ordered today: None    All questions were answered.  The patient indicates understanding of the above issues and agrees with the plan set forth.  Total face to face time  greater than or equal to 25 minutes.       Follow-up:  3 months, on or after 9/22/18.    Jasmin Arteaga NP  Endocrinology  [facility]  CC: Aden Lopez    Again, thank you for allowing me to participate in the care of your patient.        Sincerely,        SARAH Raymundo CNP

## 2018-06-07 NOTE — PROGRESS NOTES
Name: Crispin Rojas  Seen today for Diabetes (Last seen 3/2/2018).  HPI:  Crispin Rojas is a 58 year old male who presents for the evaluation/management of:    1. Type 2 DM:  Originally diagnosed at the age of: 1997.  Insulin treated 15+ years.    Current Regimen: Metformin 1000 mg bid, Bydureon 2 mg weekly, Tresiba 55 units qd, Humalog 35 units (full meal) or 17 units for a snack tid + 2:50>150.    Started Bydureon about 1 month ago - tolerating well.  He feels the Bydureon has helped improve his blood sugars significantly.  Using personal CGM - noting BG spikes after meals but eventually decreases back to target range.  Only eats 1-2 meals per day, still takes Humalog tid - lower dose if eating a snack instead of a full meal, correction dose only if not eating.    BS checks: 3-5 times per day, + Corwin  Meter Download: forgot meter  Corwin CGM download: Did not bring Corwin .    Right foot ulcer developed in September, blood sugars have been high since that time   History of foot ulcers - Avid neenaceleste, gets blisters from golf shoes, + history of osteomyelitis    Working with diabetes ed - last seen by June Walker 4/20/2018.    Taking gabapenin per neurology for ear/nerve problem.    Complications:   Diabetes Complications  Description / Detail    Diabetic Retinopathy   + diabetic retinopathy, next appointment with retinal specialist 6/21, last laser treatment 10/2016  Regular opthalmoologist visit 3/2018   CAD / PAD  No   Neuropathy   Yes, sees Dr. Molina, last seen 2/12/2018 - no open foot lesions at this time.   Nephropathy / Microalbuminuria  Yes, elevated microalbumin   Gastroparesis  No   Hypoglycemia Unawareness  No       2. Hypertension: Blood Pressure today:   BP Readings from Last 3 Encounters:   06/07/18 125/68   05/09/18 132/70   03/20/18 160/85   .  Blood pressure medications include Amlodipine 5 mg qd, lisinopril 20 mg qd.  Takes medications everyday without forgetting a dose.  Denies feeling  lightheaded or dizzy.  3. Hyperlipidemia: Takes Atorvastatin 40 mg qd for lipid control.  Denies muscle aches of pains.       4. Prevention:  Flu Shot-   Pneumovax- 11/2015  Opthalmology-annaully  Dental-recommend semiannually  ASA-  Smoking-   PMH/PSH:  Past Medical History:   Diagnosis Date     Cerebral infarction (H)      Chronic infection      H/O heartburn      History of heartburn      Hypertension      Numbness and tingling      Obesity      Renal stone      Type II or unspecified type diabetes mellitus without mention of complication, not stated as uncontrolled      Past Surgical History:   Procedure Laterality Date     AMPUTATE FOOT Left 8/18/2016    Procedure: AMPUTATE FOOT;  Surgeon: Jack Younger DPM;  Location: [facility]     AMPUTATE TOE(S) Right 2/1/2016    Procedure: AMPUTATE TOE(S);  Surgeon: Rachelle Manriquez DPM, Pod;  Location: [facility] OR     ANGIOGRAM       COLONOSCOPY       COMBINED CYSTOSCOPY, RETROGRADES, URETEROSCOPY, INSERT STENT Left 8/21/2016    Procedure: COMBINED CYSTOSCOPY, RETROGRADES, URETEROSCOPY, INSERT STENT;  Surgeon: Artemio Valenzuela MD;  Location: [facility] OR     COMBINED CYSTOSCOPY, RETROGRADES, URETEROSCOPY, LASER HOLMIUM LITHOTRIPSY URETER(S), INSERT STENT Left 10/3/2016    Procedure: COMBINED CYSTOSCOPY, RETROGRADES, URETEROSCOPY, LASER HOLMIUM LITHOTRIPSY URETER(S), INSERT STENT;  Surgeon: Artemio Valenzuela MD;  Location: [facility]     EXTRACORPOREAL SHOCK WAVE LITHOTRIPSY (ESWL) Left 9/8/2016    Procedure: EXTRACORPOREAL SHOCK WAVE LITHOTRIPSY (ESWL);  Surgeon: Artemio Valenzuela MD;  Location: [facility]     RECESSION GASTROCNEMIUS Right 2/1/2016    Procedure: RECESSION GASTROCNEMIUS;  Surgeon: Rachelle Manriquez DPM, Pod;  Location: [facility]     Family Hx:  Family History   Problem Relation Age of Onset     Arthritis Mother      SLE     Connective Tissue Disorder Mother      lupus     DIABETES Mother      CEREBROVASCULAR DISEASE Mother      CANCER Mother      DIABETES Father       DIABETES Maternal Grandfather      DIABETES Sister      Thyroid disease:          DM2: Yes: Strong family history on mothers side - mom, 7 maternal uncles, two maternal aunts, + MGF.         Autoimmune: DM1, SLE, RA, Vitiligo Yes: Lupus    Social Hx:  Social History     Social History     Marital status:      Spouse name: Sydney     Number of children: 2     Years of education: N/A     Occupational History     marketing [employer]     Social History Main Topics     Smoking status: Never Smoker     Smokeless tobacco: Never Used     Alcohol use 0.0 oz/week     0 Standard drinks or equivalent per week      Comment: rare---red wine 2x per week     Drug use: No     Sexual activity: Yes     Partners: Female     Other Topics Concern     Parent/Sibling W/ Cabg, Mi Or Angioplasty Before 65f 55m? No     Social History Narrative          MEDICATIONS:  has a current medication list which includes the following prescription(s): amlodipine, atorvastatin, blood glucose, blood glucose monitoring, blood glucose monitoring, cholecalciferol, cialis, cialis, co-enzyme q10, freestyle lilly reader, continuous blood glucose monitoring, exenatide er, gabapentin, insulin degludec, insulin lispro, insulin lispro, pen needles, bd insulin syringe ultrafine, ketoconazole, thin, lisinopril, metformin, multiple vitamins-minerals, omega-3 fatty acids, and semaglutide.    ROS     ROS: 10 point ROS neg other than the symptoms noted above in the HPI.    Physical Exam   VS: /68 (BP Location: Left arm, Patient Position: Chair, Cuff Size: Adult Large)  Pulse 56  Temp 97.9  F (36.6  C) (Oral)  Wt 113.8 kg (250 lb 14.4 oz)  SpO2 96%  BMI 35.39 kg/m2  GENERAL: NAD, well dressed, answering questions appropriately, appears stated age.  HEENT: no exopthalmous, no proptosis, no lig lag, no retraction, no scleral icterus  RESPIRATORY: Clear bilaterally.  Normal respiratory effort.  CARDIOVASCULAR: RRR.  No peripheral  edema.  EXTREMITIES: no edema, feet with diminished plantar sensation right foot and absent plantar sensation left foot, prior great toe amputations, complete on the left, partial on the right, no open lesions.  NEUROLOGY: CN grossly intact, no tremors  MSK: grossly intact, No digital cyanosis. Normal gait and station.  SKIN: no rashes, no lesions, no ulcers  PSYCH: Intact judgment and insight. A&OX3 with a cordial affect.    LABS:  A1c:   Component    Latest Ref Rng & Units 3/31/2017 12/27/2017 3/20/2018   Hemoglobin A1C    4.3 - 6.0 % 7.5 (H) 9.6 (H) 7.5 (H)     Basic Metabolic Panel:  !COMPREHENSIVE Latest Ref Rng & Units 12/27/2017   SODIUM 133 - 144 mmol/L 139   POTASSIUM 3.4 - 5.3 mmol/L 4.4   CHLORIDE 94 - 109 mmol/L 105   CO2 mmol/L    BUN 7 - 30 mg/dL 20   Creatinine 0.66 - 1.25 mg/dL    Creatinine 0.66 - 1.25 mg/dL 0.83   Glucose 70 - 99 mg/dL 255 (H)   ANION GAP 3 - 14 mmol/L 8   CALCIUM 8.5 - 10.1 mg/dL 8.3 (L)     LFTS/Cholesterol Panel:  !LIPID/HEPATIC Latest Ref Rng & Units 12/27/2017   CHOLESTEROL <200 mg/dL 96   TRIGLYCERIDES <150 mg/dL 167 (H)   HDL CHOLESTEROL >39 mg/dL 26 (L)   LDL CHOLESTEROL DIRECT 0 - 129 mg/dL    LDL CHOLESTEROL, CALCULATED <100 mg/dL 37   VLDL-CHOLESTEROL 0 - 30 mg/dL    NON HDL CHOLESTEROL <130 mg/dL 70   CHOLESTEROL/HDL RATIO 0.0 - 5.0    AST 0 - 45 U/L 40   ALT 0 - 70 U/L 53     Thyroid Function:   !THYROID Latest Ref Rng & Units 12/27/2017   TSH 0.40 - 4.00 mU/L 1.78     Urine MicroAlbumin:  Component    Latest Ref Rng & Units 12/27/2017   Creatinine Urine    mg/dL 95   Albumin Urine mg/L    mg/L 23   Albumin Urine mg/g Cr    0 - 17 mg/g Cr 24.55 (H)     Vitamin D:    All pertinent notes, labs, and images personally reviewed by me.     A/P  Mr.Patrick AKASH Rojas is a 58 year old here for the evaluation/management of diabetes:    1. DM2 - Uncontrolled, improved.  Trial of Admelog in place of Humalog if covered by insurance, continue same dose as Humalog.  He can take the  Ademlog right before or up to 20 minutes after he starts eating.  D/c Bydureon.   Start Ozempic 1 mg weekly.  $25 copay card provided for 5 month trial.  If better tolerated and better blood sugar control, will see if a PA can be approved.  Alternately, could consider changing to Trulicity if better insurance coverage.  Continue Metformin 1000 mg bid, Tresiba 55 units qd.  Schedule lab appointment for A1c 6/22.  There is some variability among people, most will usually develop symptoms suggestive of hypoglycemia when blood glucose levels are lowered to the mid 60's. The first set of symptoms are called adrenergic. Patients may experience any of the following nervousness, sweating, intense hunger, trembling, weakness, palpitations, and difficulty speaking.   The acute management of hypoglycemia involves the rapid delivery of a source of easily absorbed sugar. Regular soda, juice, lifesavers, table sugar, are good options. 15 grams of glucose is the dose that is given, followed by an assessment of symptoms and a blood glucose check if possible. If after 10 minutes there is no improvement, another 10-15 grams should be given. This can be repeated up to three times. The equivalency of 10-15 grams of glucose (approximate servings) are: 3-5 hard candies, 3 teaspoons of sugar, or 1/2 cup of regular soda or juice.      2. Hypertension - Controlled.    3. Hyperlipidemia - On statin therapy    Labs ordered today:   Orders Placed This Encounter   Procedures     **A1C FUTURE 3mo       Radiology/Consults ordered today: None    All questions were answered.  The patient indicates understanding of the above issues and agrees with the plan set forth.  Total face to face time greater than or equal to 25 minutes.       Follow-up:  3 months, on or after 9/22/18.    Jasmin Arteaga NP  Endocrinology  [facility]  CC: Aden Lopez

## 2018-06-07 NOTE — MR AVS SNAPSHOT
After Visit Summary   6/7/2018    Crispin Rojas    MRN: [id number]           Patient Information     Date Of Birth          1960        Visit Information        Provider Department      6/7/2018 8:00 AM Jasmin Arteaga APRN [facility]        Today's Diagnoses     Type 2 diabetes mellitus with peripheral neuropathy (H)    -  1      Care Instructions    Once you use up your current supply of Bydureon, change to Ozempic 1 mg weekly.  Use the copay card of $25 per month for 5 months.  We can try to get a prior auth after that OR change to trulicity if your insurance company won't pay for continued Ozempic.    If Admelog is covered, can use this new insulin in place of Humalog    You can make a lab appointment to recheck the A1c on or after 6/21/18 or after.    Follow up with me in 3 months, on or after 9/22/18.    Jasmin Arteaga NP  Endocrinology            Follow-ups after your visit        Your next 10 appointments already scheduled     Jun 14, 2018  3:00 PM CDT   Nurse Only with HEALTH  - [facility]   [facility]    [address] [place] [address] [id number] [phone]              Future tests that were ordered for you today     Open Future Orders        Priority Expected Expires Ordered    **A1C FUTURE 3mo Routine 9/5/2018 10/5/2018 6/7/2018            Who to contact     If you have questions or need follow up information about today's clinic visit or your schedule please contact [facility] directly at [phone].  Normal or non-critical lab and imaging results will be communicated to you by MyChart, letter or phone within 4 business days after the clinic has received the results. If you do not hear from us within 7 days, please contact the clinic through MyChart or phone. If you have a critical or abnormal lab result, we will notify you by phone as soon as possible.  Submit refill  requests through [facility] or call your pharmacy and they will forward the refill request to us. Please allow 3 business days for your refill to be completed.          Additional Information About Your Visit        [facility] Information     [facility] gives you secure access to your electronic health record. If you see a primary care provider, you can also send messages to your care team and make appointments. If you have questions, please call your primary care clinic.  If you do not have a primary care provider, please call [phone] and they will assist you.        Care EveryWhere ID     This is your Care EveryWhere ID. This could be used by other organizations to access your [facility] medical records  [id number]        Your Vitals Were     Pulse Temperature Pulse Oximetry BMI (Body Mass Index)          56 97.9  F (36.6  C) (Oral) 96% 35.39 kg/m2         Blood Pressure from Last 3 Encounters:   06/07/18 125/68   05/09/18 132/70   03/20/18 160/85    Weight from Last 3 Encounters:   06/07/18 113.8 kg (250 lb 14.4 oz)   05/09/18 115.9 kg (255 lb 9.6 oz)   03/20/18 116.6 kg (257 lb 1.6 oz)                 Today's Medication Changes          These changes are accurate as of 6/7/18  9:01 AM.  If you have any questions, ask your nurse or doctor.               Start taking these medicines.        Dose/Directions    Semaglutide 1 MG/DOSE Sopn   Commonly known as:  OZEMPIC   Used for:  Type 2 diabetes mellitus with peripheral neuropathy (H)   Started by:  Jasmin Arteaga APRN CNP        Dose:  1 mg   Inject 1 mg Subcutaneous once a week   Quantity:  1 pen   Refills:  4         These medicines have changed or have updated prescriptions.        Dose/Directions    * insulin lispro 100 UNIT/ML injection   Commonly known as:  HumaLOG KWIKpen   This may have changed:  Another medication with the same name was added. Make sure you understand how and when to take each.   Used for:  Type 2 diabetes mellitus with peripheral  neuropathy (H)   Changed by:  Jasmin Arteaga APRN CNP        Inject 35 units sq tid.  Dispense 90-day supply or as allowed by insurance.   Quantity:  30 mL   Refills:  3       * insulin lispro 100 UNIT/ML injection   Commonly known as:  ADMELOG SOLOSTAR   This may have changed:  You were already taking a medication with the same name, and this prescription was added. Make sure you understand how and when to take each.   Used for:  Type 2 diabetes mellitus with peripheral neuropathy (H)   Changed by:  Jasmin Arteaga APRN CNP        Inject 35 units sq tid.  Dispense 90-day supply or as allowed by insurance.   Quantity:  30 mL   Refills:  3       * Notice:  This list has 2 medication(s) that are the same as other medications prescribed for you. Read the directions carefully, and ask your doctor or other care provider to review them with you.         Where to get your medicines      These [place] [facility] [address]  [address] [facility] [address]     Phone:  [phone]     insulin lispro 100 UNIT/ML injection    Semaglutide 1 MG/DOSE Sopn                Primary Care Provider Office Phone # Fax #    Aden Ethan Lopez [phone] [address] [facility] [address]        Equal Access to Services     ELI MARTÍNEZ AH: Hadii geraldine arechiga hadasho Soomaali, waaxda luqadaha, qaybta kaalmada adeegyada, waxjeffry randlein hayarya rhodes. So [facility] [phone].    ATENCIÓN: Si habla español, tiene a gomez disposición servicios gratuitos de asistencia lingüística. Llame al [phone].    We comply with applicable federal civil rights laws and [place] laws. We do not discriminate on the basis of race, color, national origin, age, disability, sex, sexual orientation, or gender identity.            Thank you!     Thank you for choosing [facility]  for your care. Our goal is always to provide you with  "excellent care. Hearing back from our patients is one way we can continue to improve our services. Please take a few minutes to complete the written survey that you may receive in the mail after your visit with us. Thank you!             Your Updated Medication List - Protect others around you: Learn how to safely use, store and throw away your medicines at www.disposemymeds.org.          This list is accurate as of 6/7/18  9:01 AM.  Always use your most recent med list.                   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 Use one syringe  daily or as directed.    Type 2 diabetes, HbA1C goal < 8% (H)       blood glucose lancets standard    no brand specified    300 each    Use to test blood sugar 3 times daily or as directed.    Type 2 diabetes mellitus with diabetic neuropathy, with long-term current use of insulin (H)       blood glucose monitoring meter device kit    no brand specified    1 kit    Use to test blood sugar 3 times daily or as directed.    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)       * CIALIS 5 MG tablet   Generic drug:  tadalafil     30 tablet    TAKE 1 TABLET BY MOUTH EVERY DAY AS NEEDED    Erectile dysfunction due to diseases classified elsewhere       * CIALIS 5 MG tablet   Generic drug:  tadalafil     30 tablet    " TAKE 1 TABLET BY MOUTH EVERY DAY AS NEEDED    Erectile dysfunction due to diseases classified elsewhere       Co-Enzyme Q10 100 MG Caps      Take 100 mg by mouth daily        continuous blood glucose monitoring sensor     3 each    For use with Freestyle Corwin Flash  for continuous monitioring of blood glucose levels. Replace sensor every 10 days.    Type 2 diabetes mellitus with diabetic peripheral angiopathy and gangrene, with long-term current use of insulin (H)       exenatide ER 2 MG SQ pen for weelky inj    BYDUREON    4 kit    Inject 2 mg Subcutaneous every 7 days    Type 2 diabetes mellitus with peripheral neuropathy (H)       FREESTYLE CORWIN READER Iman     1 Device    1 Device as needed    Type 2 diabetes mellitus with diabetic peripheral angiopathy and gangrene, with long-term current use of insulin (H)       gabapentin 100 MG capsule    NEURONTIN     Take 300 mg by mouth At Bedtime        insulin degludec 100 UNIT/ML pen    TRESIBA    30 mL    Inject 55 Units Subcutaneous daily    Type 2 diabetes mellitus with diabetic peripheral angiopathy and gangrene, with long-term current use of insulin (H)       * insulin lispro 100 UNIT/ML injection    HumaLOG KWIKpen    30 mL    Inject 35 units sq tid.  Dispense 90-day supply or as allowed by insurance.    Type 2 diabetes mellitus with peripheral neuropathy (H)       * insulin lispro 100 UNIT/ML injection    ADMELOG SOLOSTAR    30 mL    Inject 35 units sq tid.  Dispense 90-day supply or as allowed by insurance.    Type 2 diabetes mellitus with peripheral neuropathy (H)       ketoconazole 2 % shampoo    NIZORAL    120 mL    APPLY TO THE AFFECTED AREA AND WASH OFF AFTER 5 MINUTES.    Seborrheic dermatitis       lisinopril 20 MG tablet    PRINIVIL/ZESTRIL    90 tablet    Take 1 tablet (20 mg) by mouth daily    Essential hypertension       metFORMIN 500 MG 24 hr tablet    GLUCOPHAGE-XR    360 tablet    TAKE 2 TABLETS BY MOUTH TWICE DAILY WITH MEALS     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)       MULTIVITAMIN PO      Take 1 tablet by mouth daily        OMEGA-3 FISH OIL PO      Take 2 g by mouth daily        Pen Needles 31G X 5 MM Misc     200 each    1 Device 5 times daily , TWICE DAILY WITH LANTUS AND 3 TIMES A DAY PRN WITH NOVOLOG FLEXPEN    Type 2 diabetes mellitus with peripheral neuropathy (H)       Semaglutide 1 MG/DOSE Sopn    OZEMPIC    1 pen    Inject 1 mg Subcutaneous once a week    Type 2 diabetes mellitus with peripheral neuropathy (H)       thin lancets    NO BRAND SPECIFIED    300 each    1 each 3 times daily.    Type 2 diabetes, HbA1C goal < 8% (H)       VITAMIN D3 PO      Take 1,000 Units by mouth daily        * Notice:  This list has 4 medication(s) that are the same as other medications prescribed for you. Read the directions carefully, and ask your doctor or other care provider to review them with you.

## 2018-06-21 ENCOUNTER — ALLIED HEALTH/NURSE VISIT (OUTPATIENT)
Dept: NURSING | Facility: CLINIC | Age: 58
End: 2018-06-21

## 2018-06-21 ENCOUNTER — TRANSFERRED RECORDS (OUTPATIENT)
Dept: HEALTH INFORMATION MANAGEMENT | Facility: CLINIC | Age: 58
End: 2018-06-21

## 2018-06-21 VITALS — BODY MASS INDEX: 35.31 KG/M2 | WEIGHT: 250.31 LBS

## 2018-06-21 DIAGNOSIS — E11.42 TYPE 2 DIABETES MELLITUS WITH PERIPHERAL NEUROPATHY (H): ICD-10-CM

## 2018-06-21 DIAGNOSIS — E66.01 MORBID OBESITY, UNSPECIFIED OBESITY TYPE (H): Primary | ICD-10-CM

## 2018-06-21 PROCEDURE — 99207 ZZC HEALTH COACHING, NO CHARGE: CPT

## 2018-06-21 NOTE — MR AVS SNAPSHOT
After Visit Summary   2018    Crispin Rojas    MRN: [id number]           Patient Information     Date Of Birth          1960        Visit Information        Provider Department      2018 4:00 PM HEALTH  - REGION 5 [facility]        Care Instructions      2018    [facility]  [id number] [place] [id number] [phone]  Health Coaching Progress Note    Patient Name: Crispin Rojas Date: 2018      GOALS: Patient will work on the following goals until our next meetin) Be more aware of what you're eating at meals and snacks/work on reducing carbs, eat more fresh food items and add in more protein each day-New Goal  2) Start using the [facility] Kar and start tracking steps each day/find your daily average-New Goal      Plan: (Homework, other):  Patient was encouraged to continue to seek condition-related information and education, as well as schedule a follow up appointment with the Health  in 4 weeks. Patient has set self-identified goals and will monitor progress until the next appointment.  Scheduled our next coaching session for  at 8am.  Juan M Oshea       Resources:                Follow-ups after your visit        Your next 10 appointments already scheduled     Oct 11, 2018  7:00 AM CDT   Return Visit with SARAH Raymundo CNP   [facility] ([facility])    [address] [place] [id number] [phone]              Who to contact     If you have questions or need follow up information about today's clinic visit or your schedule please contact [facility] directly at [phone].  Normal or non-critical lab and imaging results will be communicated to you by MyChart, letter or phone within 4 business days after the clinic has received the results. If you  do not hear from us within 7 days, please contact the clinic through [employer] or phone. If you have a critical or abnormal lab result, we will notify you by phone as soon as possible.  Submit refill requests through [employer] or call your pharmacy and they will forward the refill request to us. Please allow 3 business days for your refill to be completed.          Additional Information About Your Visit        [employer] Information     [employer] gives you secure access to your electronic health record. If you see a primary care provider, you can also send messages to your care team and make appointments. If you have questions, please call your primary care clinic.  If you do not have a primary care provider, please call [phone] and they will assist you.        Care EveryWhere ID     This is your Care EveryWhere ID. This could be used by other organizations to access your [facility] medical records  [id number]         Blood Pressure from Last 3 Encounters:   06/07/18 125/68   05/09/18 132/70   03/20/18 160/85    Weight from Last 3 Encounters:   06/07/18 250 lb 14.4 oz (113.8 kg)   05/09/18 255 lb 9.6 oz (115.9 kg)   03/20/18 257 lb 1.6 oz (116.6 kg)              Today, you had the following     No orders found for display       Primary Care Provider Office Phone # Fax #    Aden Lopez [phone] [address] [facility] [address]        Equal Access to Services     [facility] : Hadii aad ku hadasho Soomaali, waaxda luqadaha, qaybta kaalmada adeegyada, blanca moran . So [facility] [phone].    ATENCIÓN: Si habla español, tiene a gomez disposición servicios gratuitos de asistencia lingüística. Llame al [phone].    We comply with applicable federal civil rights laws and [place] laws. We do not discriminate on the basis of race, color, national origin, age, disability, sex, sexual orientation, or gender identity.            Thank you!     Thank you for  "choosing [facility]  for your care. Our goal is always to provide you with excellent care. Hearing back from our patients is one way we can continue to improve our services. Please take a few minutes to complete the written survey that you may receive in the mail after your visit with us. Thank you!             Your Updated Medication List - Protect others around you: Learn how to safely use, store and throw away your medicines at www.disposemymeds.org.          This list is accurate as of 6/21/18  5:00 PM.  Always use your most recent med list.                   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 Use one syringe  daily or as directed.    Type 2 diabetes, HbA1C goal < 8% (H)       blood glucose lancets standard    no brand specified    300 each    Use to test blood sugar 3 times daily or as directed.    Type 2 diabetes mellitus with diabetic neuropathy, with long-term current use of insulin (H)       blood glucose monitoring meter device kit    no brand specified    1 kit    Use to test blood sugar 3 times daily or as directed.    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 Replace sensor every 10 days.    Type 2 diabetes mellitus with diabetic peripheral angiopathy and gangrene, with long-term current use of insulin (H)       exenatide ER 2 MG SQ pen for weelky inj    BYDUREON    4 kit    Inject 2 mg Subcutaneous every 7 days    Type 2 diabetes mellitus with peripheral neuropathy (H)       FREESTYLE CORWIN READER Iman     1 Device    1 Device as needed    Type 2 diabetes mellitus with diabetic peripheral angiopathy and gangrene, with long-term current use of insulin (H)       gabapentin 100 MG capsule    NEURONTIN     Take 300 mg by mouth At Bedtime        insulin degludec 100 UNIT/ML pen    TRESIBA    30 mL    Inject 55 Units Subcutaneous daily    Type 2 diabetes mellitus with diabetic peripheral angiopathy and gangrene, with long-term current use of insulin (H)       * insulin lispro 100 UNIT/ML injection    HumaLOG KWIKpen    30 mL    Inject 35 units sq tid.  Dispense 90-day supply or as allowed by insurance.    Type 2 diabetes mellitus with peripheral neuropathy (H)       * insulin lispro 100 UNIT/ML injection    ADMELOG SOLOSTAR    30 mL    Inject 35 units sq tid.  Dispense 90-day supply or as allowed by insurance.    Type 2 diabetes mellitus with peripheral neuropathy (H)       ketoconazole 2 % shampoo    NIZORAL    120 mL    APPLY TO THE AFFECTED AREA AND WASH OFF AFTER 5 MINUTES.    Seborrheic dermatitis       lisinopril 20 MG tablet    PRINIVIL/ZESTRIL    90 tablet    Take 1 tablet (20 mg) by mouth daily    Essential hypertension       metFORMIN 500 MG 24 hr tablet     GLUCOPHAGE-XR    360 tablet    TAKE 2 TABLETS BY MOUTH TWICE DAILY WITH MEALS    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)       MULTIVITAMIN PO      Take 1 tablet by mouth daily        OMEGA-3 FISH OIL PO      Take 2 g by mouth daily        Pen Needles 31G X 5 MM Misc     200 each    1 Device 5 times daily , TWICE DAILY WITH LANTUS AND 3 TIMES A DAY PRN WITH NOVOLOG FLEXPEN    Type 2 diabetes mellitus with peripheral neuropathy (H)       Semaglutide 1 MG/DOSE Sopn    OZEMPIC    1 pen    Inject 1 mg Subcutaneous once a week    Type 2 diabetes mellitus with peripheral neuropathy (H)       thin lancets    NO BRAND SPECIFIED    300 each    1 each 3 times daily.    Type 2 diabetes, HbA1C goal < 8% (H)       VITAMIN D3 PO      Take 1,000 Units by mouth daily        * Notice:  This list has 4 medication(s) that are the same as other medications prescribed for you. Read the directions carefully, and ask your doctor or other care provider to review them with you.

## 2018-06-21 NOTE — PATIENT INSTRUCTIONS
2018    [address] [address]  [phone]  Health Coaching Progress Note    Patient Name: Crispin Rojas Date: 2018      GOALS: Patient will work on the following goals until our next meetin) Be more aware of what you're eating at meals and snacks/work on reducing carbs, eat more fresh food items and add in more protein each day-New Goal  2) Start using the [facility] Kar and start tracking steps each day/find your daily average-New Goal      Plan: (Homework, other):  Patient was encouraged to continue to seek condition-related information and education, as well as schedule a follow up appointment with the Health  in 4 weeks. Patient has set self-identified goals and will monitor progress until the next appointment.  Scheduled our next coaching session for  at 8am.  Juan M Oshea       Resources:

## 2018-06-21 NOTE — PROGRESS NOTES
June 21, 2018    [address] [id number] [phone]  Health Coaching Progress Note    Patient Name: Crispin Rojas Date: June 21, 2018      Session Length: 60      DATA    PRM Master Survey Scores Reviewed: Yes    Core Healthy Days Survey:    Would you say that in general your health is: : Fair    ROCHELLE Score (Last Two) 6/21/2018   ROCHELLE Raw Score 30   Activation Score 56   ROCHELLE Level 3       PHQ-2 Score 6/21/2018 3/20/2018   PHQ-2 Total Score Interpretation - Positive if 3 or more points; Administer PHQ-9 if positive 0 0   MyC PHQ-2 Score - 0       PHQ-9 SCORE 3/20/2018 5/9/2018   Total Score MyChart Incomplete -   Total Score - 0       Treatment Objective(s) Addressed in This Session:  Target Behavior(s): diet/weight loss and disease management/lifestyle changes of working on getting back to old healthy habits-being more aware of dietary choices by reducing carb intake, adding in more fresh foods and protein, trying to find ways to be more active with busy schedule-will download [employer] step tracking Kar and start to pay attention to daily step counts, managing diabetes well, losing weight and maintaining, accessing resources and scheduling/attending appointments as needed.    Current Stressors / Issues:  Ongoing shoulder pain-arthritis/limits mobility, loves competitive sports (golf)/activities but hasn't been able to participate due to time constraints/injuries, work several jobs to pay the bills, wife is a good cook-eats too well sometimes, started gaining weight with insulin use, wants to lose weight and maintain it, managing diabetes well/avoiding other medications  What Patient Does Well:   1) Patient has had success with lifestyle changes in the past and feels he knows what he needs to do, but just struggles to find time to fit everything into his routine  Previous Successes:   1) Patient states he has  lost as much as 90lbs by really focusing on dietary changes in the past  Areas in Need of Improvement:   1) Maintaining a regular exercise routine  2) Making healthier dietary choices-more fresh foods, protein-cutting back on carbs  3) Losing weight and keeping it off  4) Managing shoulder pain/regaining strength  Barriers to Change:   1) Shoulder injury  2) Works several different jobs/time is limited  3) Bad cycle of losing a lot of weight and then gaining it all back  Reasons for Change:   1) Lose weight  2) Continue to manage diabetes  3) Figure out something with his shoulder pain so he can play golf again  Plan/Goal for the Next 4 Weeks:   GOAL #1: Be more aware of what you're eating at meals and snacks/work on reducing carbs, eat more fresh food items and add in more protein each day  GOAL #1 Progress Toward Goal: 0% New Goal  GOAL #2: Start using the S-[facility] Kar and start tracking steps each day/find your daily average  GOAL #2 Progress Toward Goal: 0% New Goal    Intervention:  Motivational Interviewing    MI Intervention: Expressed Empathy/Understanding, Supported Autonomy, Collaboration, Evocation, Permission to raise concern or advise, Open-ended questions, Reflections: simple and complex and Change talk (evoked)     Change Talk Expressed by the Patient: Desire to change Ability to change Reasons to change Need to change Committment to change Activation Taking steps    Provider Response to Change Talk: E - Evoked more info from patient about behavior change, A - Affirmed patient's thoughts, decisions, or attempts at behavior change, R - Reflected patient's change talk and S - Summarized patient's change talk statements    Assessment / Progress on Treatment Objective(s) / Homework:    New Objective established this session - PREPARATION (Decided to change - considering how); Intervened by negotiating a change plan and determining options / strategies for behavior change, identifying triggers, exploring  social supports, and working towards setting a date to begin behavior change           Plan: (Homework, other):  Patient was encouraged to continue to seek condition-related information and education, as well as schedule a follow up appointment with the Health  in 4 weeks. Patient has set self-identified goals and will monitor progress until the next appointment.  Scheduled our next coaching session for Monday July 23rd at 8am.  Juan M Oshea

## 2018-06-23 ENCOUNTER — OFFICE VISIT (OUTPATIENT)
Dept: URGENT CARE | Facility: URGENT CARE | Age: 58
End: 2018-06-23
Payer: COMMERCIAL

## 2018-06-23 VITALS
OXYGEN SATURATION: 97 % | HEART RATE: 82 BPM | WEIGHT: 250 LBS | TEMPERATURE: 98.3 F | DIASTOLIC BLOOD PRESSURE: 80 MMHG | SYSTOLIC BLOOD PRESSURE: 130 MMHG | BODY MASS INDEX: 35.26 KG/M2

## 2018-06-23 DIAGNOSIS — I45.10 RIGHT BUNDLE BRANCH BLOCK: ICD-10-CM

## 2018-06-23 DIAGNOSIS — E11.42 TYPE 2 DIABETES MELLITUS WITH PERIPHERAL NEUROPATHY (H): ICD-10-CM

## 2018-06-23 DIAGNOSIS — R42 DIZZINESS: ICD-10-CM

## 2018-06-23 DIAGNOSIS — R53.83 FATIGUE, UNSPECIFIED TYPE: Primary | ICD-10-CM

## 2018-06-23 DIAGNOSIS — K21.9 GASTROESOPHAGEAL REFLUX DISEASE WITHOUT ESOPHAGITIS: ICD-10-CM

## 2018-06-23 DIAGNOSIS — R35.0 URINARY FREQUENCY: ICD-10-CM

## 2018-06-23 LAB
ALBUMIN SERPL-MCNC: 4.1 G/DL (ref 3.4–5)
ALBUMIN UR-MCNC: NEGATIVE MG/DL
ALP SERPL-CCNC: 58 U/L (ref 40–150)
ALT SERPL W P-5'-P-CCNC: 62 U/L (ref 0–70)
ANION GAP SERPL CALCULATED.3IONS-SCNC: 9 MMOL/L (ref 3–14)
APPEARANCE UR: CLEAR
AST SERPL W P-5'-P-CCNC: 42 U/L (ref 0–45)
BASOPHILS # BLD AUTO: 0 10E9/L (ref 0–0.2)
BASOPHILS NFR BLD AUTO: 0.6 %
BILIRUB SERPL-MCNC: 0.7 MG/DL (ref 0.2–1.3)
BILIRUB UR QL STRIP: NEGATIVE
BUN SERPL-MCNC: 16 MG/DL (ref 7–30)
CALCIUM SERPL-MCNC: 9 MG/DL (ref 8.5–10.1)
CHLORIDE SERPL-SCNC: 103 MMOL/L (ref 94–109)
CO2 SERPL-SCNC: 25 MMOL/L (ref 20–32)
COLOR UR AUTO: YELLOW
CREAT SERPL-MCNC: 0.96 MG/DL (ref 0.66–1.25)
DIFFERENTIAL METHOD BLD: ABNORMAL
EOSINOPHIL # BLD AUTO: 0.1 10E9/L (ref 0–0.7)
EOSINOPHIL NFR BLD AUTO: 0.7 %
ERYTHROCYTE [DISTWIDTH] IN BLOOD BY AUTOMATED COUNT: 13.3 % (ref 10–15)
GFR SERPL CREATININE-BSD FRML MDRD: 81 ML/MIN/1.7M2
GLUCOSE BLD-MCNC: 85 MG/DL (ref 70–99)
GLUCOSE SERPL-MCNC: 83 MG/DL (ref 70–99)
GLUCOSE UR STRIP-MCNC: NEGATIVE MG/DL
HCT VFR BLD AUTO: 40.2 % (ref 40–53)
HGB BLD-MCNC: 13.3 G/DL (ref 13.3–17.7)
HGB UR QL STRIP: NEGATIVE
KETONES UR STRIP-MCNC: ABNORMAL MG/DL
LEUKOCYTE ESTERASE UR QL STRIP: NEGATIVE
LYMPHOCYTES # BLD AUTO: 1.2 10E9/L (ref 0.8–5.3)
LYMPHOCYTES NFR BLD AUTO: 16.6 %
MCH RBC QN AUTO: 29 PG (ref 26.5–33)
MCHC RBC AUTO-ENTMCNC: 33.1 G/DL (ref 31.5–36.5)
MCV RBC AUTO: 88 FL (ref 78–100)
MONOCYTES # BLD AUTO: 0.6 10E9/L (ref 0–1.3)
MONOCYTES NFR BLD AUTO: 8.3 %
MUCOUS THREADS #/AREA URNS LPF: PRESENT /LPF
NEUTROPHILS # BLD AUTO: 5.2 10E9/L (ref 1.6–8.3)
NEUTROPHILS NFR BLD AUTO: 73.8 %
NITRATE UR QL: NEGATIVE
NON-SQ EPI CELLS #/AREA URNS LPF: ABNORMAL /LPF
PH UR STRIP: 6 PH (ref 5–7)
PLATELET # BLD AUTO: 139 10E9/L (ref 150–450)
POTASSIUM SERPL-SCNC: 3.9 MMOL/L (ref 3.4–5.3)
PROT SERPL-MCNC: 8.4 G/DL (ref 6.8–8.8)
RBC # BLD AUTO: 4.58 10E12/L (ref 4.4–5.9)
RBC #/AREA URNS AUTO: ABNORMAL /HPF
SODIUM SERPL-SCNC: 137 MMOL/L (ref 133–144)
SOURCE: ABNORMAL
SP GR UR STRIP: 1.02 (ref 1–1.03)
UROBILINOGEN UR STRIP-ACNC: 0.2 EU/DL (ref 0.2–1)
WBC # BLD AUTO: 7.1 10E9/L (ref 4–11)
WBC #/AREA URNS AUTO: ABNORMAL /HPF

## 2018-06-23 PROCEDURE — 85025 COMPLETE CBC W/AUTO DIFF WBC: CPT | Performed by: NURSE PRACTITIONER

## 2018-06-23 PROCEDURE — 36415 COLL VENOUS BLD VENIPUNCTURE: CPT | Performed by: NURSE PRACTITIONER

## 2018-06-23 PROCEDURE — 81001 URINALYSIS AUTO W/SCOPE: CPT | Performed by: NURSE PRACTITIONER

## 2018-06-23 PROCEDURE — 82947 ASSAY GLUCOSE BLOOD QUANT: CPT | Mod: 59 | Performed by: NURSE PRACTITIONER

## 2018-06-23 PROCEDURE — 99215 OFFICE O/P EST HI 40 MIN: CPT | Performed by: NURSE PRACTITIONER

## 2018-06-23 PROCEDURE — 93000 ELECTROCARDIOGRAM COMPLETE: CPT | Performed by: NURSE PRACTITIONER

## 2018-06-23 PROCEDURE — 80053 COMPREHEN METABOLIC PANEL: CPT | Performed by: NURSE PRACTITIONER

## 2018-06-23 RX ORDER — PANTOPRAZOLE SODIUM 20 MG/1
TABLET, DELAYED RELEASE ORAL
Qty: 30 TABLET | Refills: 1 | Status: SHIPPED | OUTPATIENT
Start: 2018-06-23 | End: 2018-10-11

## 2018-06-23 ASSESSMENT — ENCOUNTER SYMPTOMS
HEARTBURN: 1
CONSTIPATION: 0
SINUS PAIN: 0
DIZZINESS: 1
SORE THROAT: 0
FREQUENCY: 0
PALPITATIONS: 0
VOMITING: 0
DYSURIA: 0
CHILLS: 1
ABDOMINAL PAIN: 0
FLANK PAIN: 0
FEVER: 0
HEMATURIA: 0
SHORTNESS OF BREATH: 0
NAUSEA: 1
COUGH: 0

## 2018-06-23 NOTE — PROGRESS NOTES
"  SUBJECTIVE:                                                    Crispin Rojas is a 58 year old male who presents to clinic today for the following health issues:    HPI  Pt presents with multiple complaints including, extreme fatigue, dizziness, chills, and severe heartburn. He reports the heartburn has been present for a few months and occurs when lying down on back or left side. He takes Tums occasionally when it is \"real bad\" which helps. The fatigue, dizziness, and chills started qithin the past week. Dizziness is pronounces when he stands from sitting. He does have a history of diabetes for which he was las seen by his Endocrinologist earlier this month, at which time he hgb A1C was 7.2. Affirms marked polyuria 1 week ago, but this appears to have resolved. He is unaware of any other precipitating or alleviating factors for his symptoms except as noted above.    Problem list and histories reviewed & adjusted, as indicated.    Past Medical History:   Diagnosis Date     Cerebral infarction (H)      Chronic infection      H/O heartburn      History of heartburn      Hypertension      Numbness and tingling      Obesity      Renal stone      Type II or unspecified type diabetes mellitus without mention of complication, not stated as uncontrolled        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     CARDIOVASCULAR SCREENING; LDL GOAL LESS THAN 100     Morbid obesity, unspecified obesity type (H)     Other specified transient cerebral ischemias     Calculus of kidney     Chronic left shoulder pain     Cervicalgia     Type 2 diabetes mellitus with peripheral neuropathy (H)     Hyperlipidemia LDL goal <100     Benign essential hypertension     Right bundle branch block     Past Surgical History:   Procedure Laterality Date     AMPUTATE FOOT Left 8/18/2016    Procedure: AMPUTATE FOOT;  Surgeon: Jack Younger DPM;  Location: [facility]     AMPUTATE TOE(S) Right " 2/1/2016    Procedure: AMPUTATE TOE(S);  Surgeon: Rachelle Manriquez DPM, Pod;  Location: [facility]     ANGIOGRAM       COLONOSCOPY       COMBINED CYSTOSCOPY, RETROGRADES, URETEROSCOPY, INSERT STENT Left 8/21/2016    Procedure: COMBINED CYSTOSCOPY, RETROGRADES, URETEROSCOPY, INSERT STENT;  Surgeon: Artemio Valenzuela MD;  Location: [facility]     COMBINED CYSTOSCOPY, RETROGRADES, URETEROSCOPY, LASER HOLMIUM LITHOTRIPSY URETER(S), INSERT STENT Left 10/3/2016    Procedure: COMBINED CYSTOSCOPY, RETROGRADES, URETEROSCOPY, LASER HOLMIUM LITHOTRIPSY URETER(S), INSERT STENT;  Surgeon: Artemio Valenzuela MD;  Location: [facility]     EXTRACORPOREAL SHOCK WAVE LITHOTRIPSY (ESWL) Left 9/8/2016    Procedure: EXTRACORPOREAL SHOCK WAVE LITHOTRIPSY (ESWL);  Surgeon: Artemio Valenzuela MD;  Location: [facility]     RECESSION GASTROCNEMIUS Right 2/1/2016    Procedure: RECESSION GASTROCNEMIUS;  Surgeon: Rachelle Manriquez DPM, Pod;  Location: [facility]       Social History   Substance Use Topics     Smoking status: Never Smoker     Smokeless tobacco: Never Used     Alcohol use 0.0 oz/week     0 Standard drinks or equivalent per week      Comment: rare---red wine 2x per week     Family History   Problem Relation Age of Onset     Arthritis Mother      SLE     Connective Tissue Disorder Mother      lupus     Diabetes Mother      Cerebrovascular Disease Mother      Cancer Mother      Diabetes Father      Diabetes Maternal Grandfather      Diabetes Sister          Current Outpatient Prescriptions   Medication Sig Dispense Refill     pantoprazole (PROTONIX) 20 MG EC tablet Take by mouth 30-60 minutes before a meal. 30 tablet 1     amLODIPine (NORVASC) 5 MG tablet TAKE 1 TABLET BY MOUTH EVERY DAY 90 tablet 0     atorvastatin (LIPITOR) 40 MG tablet TAKE 1 TABLET BY MOUTH EVERY DAY 90 tablet 2     blood glucose (NO BRAND SPECIFIED) lancets standard Use to test blood sugar 3 times daily or as directed. 300 each 3     blood glucose monitoring (NO BRAND  "SPECIFIED) meter device kit Use to test blood sugar 3 times daily or as directed. 1 kit 0     blood glucose monitoring (NO BRAND SPECIFIED) test strip Use to test blood sugars 3 times daily or as directed 300 strip 3     Cholecalciferol (VITAMIN D3 PO) Take 1,000 Units by mouth daily        CIALIS 5 MG tablet TAKE 1 TABLET BY MOUTH EVERY DAY AS NEEDED 30 tablet 0     CIALIS 5 MG tablet TAKE 1 TABLET BY MOUTH EVERY DAY AS NEEDED 30 tablet 0     Co-Enzyme Q10 100 MG CAPS Take 100 mg by mouth daily        Continuous Blood Gluc  (FREESTYLE LUCERO READER) LESLY 1 Device as needed 1 Device 1     continuous blood glucose monitoring (FREESTYLE LUCERO) sensor For use with Freestyle Lucero Flash  for continuous monitioring of blood glucose levels. Replace sensor every 10 days. 3 each 3     exenatide ER (BYDUREON) 2 MG pen Inject 2 mg Subcutaneous every 7 days 4 kit 3     gabapentin (NEURONTIN) 100 MG capsule Take 300 mg by mouth At Bedtime       insulin degludec (TRESIBA) 100 UNIT/ML pen Inject 55 Units Subcutaneous daily 30 mL 3     insulin lispro (ADMELOG SOLOSTAR) 100 UNIT/ML injection Inject 35 units sq tid.  Dispense 90-day supply or as allowed by insurance. 30 mL 3     insulin lispro (HUMALOG KWIKPEN) 100 UNIT/ML injection Inject 35 units sq tid.  Dispense 90-day supply or as allowed by insurance. 30 mL 3     Insulin Pen Needle (PEN NEEDLES) 31G X 5 MM MISC 1 Device 5 times daily , TWICE DAILY WITH LANTUS AND 3 TIMES A DAY PRN WITH NOVOLOG FLEXPEN 200 each 11     Insulin Syringe-Needle U-100 (BD INSULIN SYRINGE ULTRAFINE) 30G X 1/2\" 0.3 ML MISC 1 Syringe daily. Use one syringe  daily or as directed. 100 each prn     ketoconazole (NIZORAL) 2 % shampoo APPLY TO THE AFFECTED AREA AND WASH OFF AFTER 5 MINUTES. 120 mL 3     lancets thin MISC 1 each 3 times daily. 300 each 3     lisinopril (PRINIVIL/ZESTRIL) 20 MG tablet Take 1 tablet (20 mg) by mouth daily 90 tablet 3     metFORMIN (GLUCOPHAGE-XR) 500 MG 24 hr " tablet TAKE 2 TABLETS BY MOUTH TWICE DAILY WITH MEALS 360 tablet 1     Multiple Vitamins-Minerals (MULTIVITAMIN PO) Take 1 tablet by mouth daily        Omega-3 Fatty Acids (OMEGA-3 FISH OIL PO) Take 2 g by mouth daily        Semaglutide (OZEMPIC) 1 MG/DOSE SOPN Inject 1 mg Subcutaneous once a week 1 pen 4     Allergies   Allergen Reactions     Niacin Swelling     SIMCOR caused edema in extremities     Simvastatin Swelling     SIMCOR caused edema in extremities       Review of Systems   Constitutional: Positive for chills and malaise/fatigue. Negative for fever.   HENT: Positive for ear pain and tinnitus (followed by Neuro, treated with gabapentin). Negative for congestion, sinus pain and sore throat.    Respiratory: Negative for cough and shortness of breath.    Cardiovascular: Negative for chest pain and palpitations.   Gastrointestinal: Positive for heartburn and nausea. Negative for abdominal pain, constipation and vomiting.   Genitourinary: Negative for dysuria, flank pain, frequency, hematuria and urgency.   Neurological: Positive for dizziness.         OBJECTIVE:     /80  Pulse 82  Temp 98.3  F (36.8  C) (Oral)  Wt 250 lb (113.4 kg)  SpO2 97%  BMI 35.26 kg/m2  Body mass index is 35.26 kg/(m^2).  Physical Exam   Constitutional: He is oriented to person, place, and time. No distress.   HENT:   Head: Normocephalic.   Right Ear: External ear normal.   Left Ear: External ear normal.   Nose: Nose normal.   Mouth/Throat: Oropharynx is clear and moist.   Eyes: Conjunctivae and EOM are normal. Pupils are equal, round, and reactive to light.   Neck: Neck supple. No JVD present. No thyromegaly present.   Cardiovascular: Normal rate, regular rhythm and normal heart sounds.  Exam reveals no gallop and no friction rub.    No murmur heard.  Pulmonary/Chest: Effort normal and breath sounds normal.   Abdominal: Soft. Normal appearance and bowel sounds are normal. There is no hepatosplenomegaly. There is generalized  tenderness (mild with palpation). There is no rigidity, no rebound, no guarding, no tenderness at McBurney's point and negative Dallas's sign.   Lymphadenopathy:     He has no cervical adenopathy.   Neurological: He is alert and oriented to person, place, and time.   Psychiatric: His speech is normal. His affect is blunt. He is slowed.         Diagnostic Test Results:  Results for orders placed or performed in visit on 06/23/18 (from the past 24 hour(s))   CBC with platelets and differential   Result Value Ref Range    WBC 7.1 4.0 - 11.0 10e9/L    RBC Count 4.58 4.4 - 5.9 10e12/L    Hemoglobin 13.3 13.3 - 17.7 g/dL    Hematocrit 40.2 40.0 - 53.0 %    MCV 88 78 - 100 fl    MCH 29.0 26.5 - 33.0 pg    MCHC 33.1 31.5 - 36.5 g/dL    RDW 13.3 10.0 - 15.0 %    Platelet Count 139 (L) 150 - 450 10e9/L    Diff Method Automated Method     % Neutrophils 73.8 %    % Lymphocytes 16.6 %    % Monocytes 8.3 %    % Eosinophils 0.7 %    % Basophils 0.6 %    Absolute Neutrophil 5.2 1.6 - 8.3 10e9/L    Absolute Lymphocytes 1.2 0.8 - 5.3 10e9/L    Absolute Monocytes 0.6 0.0 - 1.3 10e9/L    Absolute Eosinophils 0.1 0.0 - 0.7 10e9/L    Absolute Basophils 0.0 0.0 - 0.2 10e9/L   UA with Microscopic reflex to Culture   Result Value Ref Range    Color Urine Yellow     Appearance Urine Clear     Glucose Urine Negative NEG^Negative mg/dL    Bilirubin Urine Negative NEG^Negative    Ketones Urine Trace (A) NEG^Negative mg/dL    Specific Gravity Urine 1.020 1.003 - 1.035    pH Urine 6.0 5.0 - 7.0 pH    Protein Albumin Urine Negative NEG^Negative mg/dL    Urobilinogen Urine 0.2 0.2 - 1.0 EU/dL    Nitrite Urine Negative NEG^Negative    Blood Urine Negative NEG^Negative    Leukocyte Esterase Urine Negative NEG^Negative    Source Midstream Urine     WBC Urine 0 - 5 OTO5^0 - 5 /HPF    RBC Urine O - 2 OTO2^O - 2 /HPF    Squamous Epithelial /LPF Urine Few FEW^Few /LPF    Mucous Urine Present (A) NEG^Negative /LPF   Glucose, whole blood   Result Value Ref  Range    Glucose Whole Blood 85 70 - 99 mg/dL       ASSESSMENT/PLAN:       ICD-10-CM    1. Fatigue, unspecified type R53.83 Comprehensive metabolic panel (BMP + Alb, Alk Phos, ALT, AST, Total. Bili, TP)     CBC with platelets and differential     Glucose, whole blood   2. Gastroesophageal reflux disease without esophagitis K21.9 CBC with platelets and differential     pantoprazole (PROTONIX) 20 MG EC tablet   3. Urinary frequency R35.0 Comprehensive metabolic panel (BMP + Alb, Alk Phos, ALT, AST, Total. Bili, TP)     UA with Microscopic reflex to Culture     Glucose, whole blood   4. Dizziness R42 Comprehensive metabolic panel (BMP + Alb, Alk Phos, ALT, AST, Total. Bili, TP)     CBC with platelets and differential     EKG 12-lead complete w/read - Clinics     Glucose, whole blood   5. Type 2 diabetes mellitus with peripheral neuropathy (H) E11.42 Comprehensive metabolic panel (BMP + Alb, Alk Phos, ALT, AST, Total. Bili, TP)   6. Right bundle branch block I45.10        Medical Decision Making:    Differential Diagnosis:  UTI: UTI  Cardiac: Acute MI  GERD  CHF  Gastro: GERD    Serious Comorbid Conditions:  Adult:  Diabetes, htn    PLAN:    ABD Pain:  PPI and Fluids, time, rest.    Followup:  Pt made aware of limitations of testing available in UC and advised to f/u with PCP for further evaluation or present to ED if symptoms worsen.  If not improving or if condition worsens, follow up with your Primary Care Provider, If not improving or if conditions worsens over the next 12-24 hours, go to the Emergency Department    SEAN Adams, ARNP, FNP-C  [facility]

## 2018-06-23 NOTE — PATIENT INSTRUCTIONS
Right Bundle Branch Block    Right bundle branch block is a problem in the heart s electrical system. Your heart uses electrical signals to keep pumping normally. Normal heartbeats are generated in the upper right heart chamber called the right atrium. The electrical signals reach the ventricles, the main heart pumping chambers. They travel over specialized wires called bundle branches. There are 2 main bundle branches with one in the right ventricle and the other in the left ventricle. In right bundle branch block, the right bundle branch fails to conduct electricity. The signals from the atria reach the ventricles only through the left bundle branch. Instead of the left and right ventricles squeezing at the same time, the right ventricle squeezes just a little later.  What causes right bundle branch block?  Right bundle branch block can result from a number of conditions, such as:    Heart disease from high blood pressure    Chronic obstructive lung disease (COPD)    Pulmonary embolism    Cardiomyopathy    Myocarditis    Heart attack    Congenital heart disease    Surgery or other procedures on the heart  In some case, the cause of right bundle branch block is not known. The heart otherwise appears normal.  What are the symptoms of right bundle branch block?  Right bundle branch block does not cause symptoms on its own. It may make symptoms of other heart conditions worse, such as heart failure.  How is right bundle branch block diagnosed?  Right bundle branch block is diagnosed with an electrocardiogram (ECG). This test looks at the heart s rhythm. The condition is often found during an ECG for another reason. Your doctor may want to check you for other health conditions. He or she may ask about your medical history and give you a physical exam. You may also have other tests, such as:    Echocardiogram, to look at your heart s motion and blood flow through the heart    Testing to check the health and  function of your lungs    Blood tests to look at your overall health  How is right bundle branch block treated?  If you have no symptoms and no other heart conditions, no treatment is recommended. If you have or may have heart disease, your healthcare provider will want to keep track of your heart health. In particular, if you develop right bundle branch block after a heart attack, it increases the risk of death and therefore requires more intensive treatment.  Living with right bundle branch block  Your doctor may give you more instructions about how to manage your overall heart health. You might need to make lifestyle changes. These may include losing weight, quitting smoking, or improving your diet.  Keep track of any heart symptoms you have. See your doctor regularly, even if you don t have any symptoms. Make sure all your healthcare providers know about your right bundle branch block.     When should I call my healthcare provider?  Call your healthcare provider right away if you have any of these:    New symptoms    Chest pain    Fainting    Shortness of breath   Date Last Reviewed: 5/1/2016 2000-2017 [employer]. [address] [place] [address]. All rights reserved. This information is not intended as a substitute for professional medical care. Always follow your healthcare professional's instructions.        Tips to Control Acid Reflux    To control acid reflux, you ll need to make some basic diet and lifestyle changes. The simple steps outlined below may be all you ll need to ease discomfort.  Watch what you eat    Avoid fatty foods and spicy foods.    Eat fewer acidic foods, such as citrus and tomato-based foods. These can increase symptoms.    Limit drinking alcohol, caffeine, and fizzy beverages. All increase acid reflux.    Try limiting chocolate, peppermint, and spearmint. These can worsen acid reflux in some people.  Watch when you eat    Avoid lying down for 3 hours after  eating.    Do not snack before going to bed.  Raise your head  Raising your head and upper body by 4 to 6 inches helps limit reflux when you re lying down. Put blocks under the head of your bed frame to raise it.  Other changes    Lose weight, if you need to    Don t exercise near bedtime    Avoid tight-fitting clothes    Limit aspirin and ibuprofen    Stop smoking   Date Last Reviewed: 7/1/2016 2000-2017 [employer]. [address] [place] [address], [place] [address]. All rights reserved. This information is not intended as a substitute for professional medical care. Always follow your healthcare professional's instructions.        Managing Fatigue     Family members can help with meals and chores around the house.   Fatigue is common. It can be caused by worry, lack of sleep, or poor appetite. Fatigue can also be a sign of anemia, a shortage of red blood cells. You might need medical treatment for anemia. The tips below can help you feel better.  Conserving energy    Keep track of the times of day when you are most tired and plan around them. For instance, if you are more tired in the afternoon, try to get tasks done in the morning.    Decide which tasks are most important. Do those first.    Pass tasks along to others when you need to. Ask for help.    Accept help when it s offered. Tell people what they can do to help. For instance, you may need someone to fix a meal, fold clothes, or put gas in your car.    Plan rest times. You may want to take a nap each day. Just sitting quietly for a few minutes can make you feel more rested.  What you can do to feel better    Relax before you try to sleep. Take a bath or read for a while.    Form a sleep pattern. Go to bed at the same time each night and get up at the same time each morning.    Eat well. Choose foods from all of the food groups each day.    Exercise. Take a brisk walk to help increase your energy.    Avoid caffeine and alcohol. Drink plenty of water  or fruit juices instead.  Treating anemia  If you begin to feel more tired than normal, tell your doctor. Fatigue could be a sign of anemia. This problem is fairly common in cancer patients, especially during chemotherapy and radiation treatments. If your red blood cell count is too low, you may get a blood transfusion. In some cases, you may need medicine to increase the number of red blood cells your body makes.  When to call your healthcare provider  Call your healthcare provider if you have:    Shortness of breath or chest pain    A dizzy feeling when you get up from lying or sitting down    Paler skin than normal    Extreme tiredness that is not helped by sleep   Date Last Reviewed: 1/3/2016    [id number] [employer]. [address] [place] [address], [place] [address]. All rights reserved. This information is not intended as a substitute for professional medical care. Always follow your healthcare professional's instructions.

## 2018-06-23 NOTE — MR AVS SNAPSHOT
After Visit Summary   6/23/2018    Crispin Rojas    MRN: [id number]           Patient Information     Date Of Birth          1960        Visit Information        Provider Department      6/23/2018 9:35 AM Dereck Jenkins NP [facility]        Today's Diagnoses     Fatigue, unspecified type    -  1    Gastroesophageal reflux disease without esophagitis        Urinary frequency        Dizziness        Type 2 diabetes mellitus with peripheral neuropathy (H)          Care Instructions            Right Bundle Branch Block    Right bundle branch block is a problem in the heart s electrical system. Your heart uses electrical signals to keep pumping normally. Normal heartbeats are generated in the upper right heart chamber called the right atrium. The electrical signals reach the ventricles, the main heart pumping chambers. They travel over specialized wires called bundle branches. There are 2 main bundle branches with one in the right ventricle and the other in the left ventricle. In right bundle branch block, the right bundle branch fails to conduct electricity. The signals from the atria reach the ventricles only through the left bundle branch. Instead of the left and right ventricles squeezing at the same time, the right ventricle squeezes just a little later.  What causes right bundle branch block?  Right bundle branch block can result from a number of conditions, such as:    Heart disease from high blood pressure    Chronic obstructive lung disease (COPD)    Pulmonary embolism    Cardiomyopathy    Myocarditis    Heart attack    Congenital heart disease    Surgery or other procedures on the heart  In some case, the cause of right bundle branch block is not known. The heart otherwise appears normal.  What are the symptoms of right bundle branch block?  Right bundle branch block does not cause symptoms on its own. It may make symptoms of other heart conditions worse,  such as heart failure.  How is right bundle branch block diagnosed?  Right bundle branch block is diagnosed with an electrocardiogram (ECG). This test looks at the heart s rhythm. The condition is often found during an ECG for another reason. Your doctor may want to check you for other health conditions. He or she may ask about your medical history and give you a physical exam. You may also have other tests, such as:    Echocardiogram, to look at your heart s motion and blood flow through the heart    Testing to check the health and function of your lungs    Blood tests to look at your overall health  How is right bundle branch block treated?  If you have no symptoms and no other heart conditions, no treatment is recommended. If you have or may have heart disease, your healthcare provider will want to keep track of your heart health. In particular, if you develop right bundle branch block after a heart attack, it increases the risk of death and therefore requires more intensive treatment.  Living with right bundle branch block  Your doctor may give you more instructions about how to manage your overall heart health. You might need to make lifestyle changes. These may include losing weight, quitting smoking, or improving your diet.  Keep track of any heart symptoms you have. See your doctor regularly, even if you don t have any symptoms. Make sure all your healthcare providers know about your right bundle branch block.     When should I call my healthcare provider?  Call your healthcare provider right away if you have any of these:    New symptoms    Chest pain    Fainting    Shortness of breath   Date Last Reviewed: 5/1/2016 2000-2017 [employer]. [address] [place] [address], [place] [id number]. All rights reserved. This information is not intended as a substitute for professional medical care. Always follow your healthcare professional's instructions.        Tips to Control Acid Reflux    To control acid  reflux, you ll need to make some basic diet and lifestyle changes. The simple steps outlined below may be all you ll need to ease discomfort.  Watch what you eat    Avoid fatty foods and spicy foods.    Eat fewer acidic foods, such as citrus and tomato-based foods. These can increase symptoms.    Limit drinking alcohol, caffeine, and fizzy beverages. All increase acid reflux.    Try limiting chocolate, peppermint, and spearmint. These can worsen acid reflux in some people.  Watch when you eat    Avoid lying down for 3 hours after eating.    Do not snack before going to bed.  Raise your head  Raising your head and upper body by 4 to 6 inches helps limit reflux when you re lying down. Put blocks under the head of your bed frame to raise it.  Other changes    Lose weight, if you need to    Don t exercise near bedtime    Avoid tight-fitting clothes    Limit aspirin and ibuprofen    Stop smoking   Date Last Reviewed: 7/1/2016 2000-2017 [employer]. [address] [place] [address]. All rights reserved. This information is not intended as a substitute for professional medical care. Always follow your healthcare professional's instructions.        Managing Fatigue     Family members can help with meals and chores around the house.   Fatigue is common. It can be caused by worry, lack of sleep, or poor appetite. Fatigue can also be a sign of anemia, a shortage of red blood cells. You might need medical treatment for anemia. The tips below can help you feel better.  Conserving energy    Keep track of the times of day when you are most tired and plan around them. For instance, if you are more tired in the afternoon, try to get tasks done in the morning.    Decide which tasks are most important. Do those first.    Pass tasks along to others when you need to. Ask for help.    Accept help when it s offered. Tell people what they can do to help. For instance, you may need someone to fix a meal, fold  clothes, or put gas in your car.    Plan rest times. You may want to take a nap each day. Just sitting quietly for a few minutes can make you feel more rested.  What you can do to feel better    Relax before you try to sleep. Take a bath or read for a while.    Form a sleep pattern. Go to bed at the same time each night and get up at the same time each morning.    Eat well. Choose foods from all of the food groups each day.    Exercise. Take a brisk walk to help increase your energy.    Avoid caffeine and alcohol. Drink plenty of water or fruit juices instead.  Treating anemia  If you begin to feel more tired than normal, tell your doctor. Fatigue could be a sign of anemia. This problem is fairly common in cancer patients, especially during chemotherapy and radiation treatments. If your red blood cell count is too low, you may get a blood transfusion. In some cases, you may need medicine to increase the number of red blood cells your body makes.  When to call your healthcare provider  Call your healthcare provider if you have:    Shortness of breath or chest pain    A dizzy feeling when you get up from lying or sitting down    Paler skin than normal    Extreme tiredness that is not helped by sleep   Date Last Reviewed: 1/3/2016    7449-9191 [employer]. [address] [place] [address], [place] [address]. All rights reserved. This information is not intended as a substitute for professional medical care. Always follow your healthcare professional's instructions.                Follow-ups after your visit        Your next 10 appointments already scheduled     Jul 23, 2018  8:00 AM CDT   Nurse Only with HEALTH  - [facility]   [facility] ([facility])    [address] [facility] [address]   [phone]            Oct 11, 2018  7:00 AM CDT   Return Visit with SAR[facility] ([facility])    [address]  [facility] [address]   [phone]              Who to contact     If you have questions or need follow up information about today's clinic visit or your schedule please contact [facility] URG[facility] directly at [phone].  Normal or non-critical lab and imaging results will be communicated to you by MyChart, letter or phone within 4 business days after the clinic has received the results. If you do not hear from us within 7 days, please contact the clinic through [employer] or phone. If you have a critical or abnormal lab result, we will notify you by phone as soon as possible.  Submit refill requests through [employer] or call your pharmacy and they will forward the refill request to us. Please allow 3 business days for your refill to be completed.          Additional Information About Your Visit        MyChart Information     [employer] gives you secure access to your electronic health record. If you see a primary care provider, you can also send messages to your care team and make appointments. If you have questions, please call your primary care clinic.  If you do not have a primary care provider, please call [phone] and they will assist you.        Care EveryWhere ID     This is your Care EveryWhere ID. This could be used by other organizations to access your [place] medical records  [id number]        Your Vitals Were     Pulse Temperature Pulse Oximetry BMI (Body Mass Index)          82 98.3  F (36.8  C) (Oral) 97% 35.26 kg/m2         Blood Pressure from Last 3 Encounters:   06/23/18 130/80   06/07/18 125/68   05/09/18 132/70    Weight from Last 3 Encounters:   06/23/18 250 lb (113.4 kg)   06/21/18 250 lb 5 oz (113.5 kg)   06/07/18 250 lb 14.4 oz (113.8 kg)              We Performed the Following     CBC with platelets and differential     Comprehensive metabolic panel (BMP + Alb, Alk Phos, ALT, AST, Total. Bili, TP)     EKG 12-lead complete w/read - Clinics     Glucose, whole  blood     UA with Microscopic reflex to Culture          Today's Medication Changes          These changes are accurate as of 6/23/18 10:56 AM.  If you have any questions, ask your nurse or doctor.               Start taking these medicines.        Dose/Directions    pantoprazole 20 MG EC tablet   Commonly known as:  PROTONIX   Used for:  Gastroesophageal reflux disease without esophagitis   Started by:  Dereck Jenkins NP        Take by mouth 30-60 minutes before a meal.   Quantity:  30 tablet   Refills:  1            Where to get your medicines      These medications were sent to [facility] [id number] [place] [address] [address] [facility] [address] [facility] [address]  [address] [facility] [address], [facility] [address]     Phone:  [phone]     pantoprazole 20 MG EC tablet                Primary Care Provider Office Phone # Fax #    Aden Ethan Lopez [phone] [address] [facility] [address]        Equal Access to Services     ELI MARTÍNEZ AH: Hadii aad ku hadasho Soomaali, waaxda luqadaha, qaybta kaalmada adeegyada, waxay idiin hayaan adejefry daltonarajasson moran . So [facility] [phone].    ATENCIÓN: Si habla español, tiene a gomez disposición servicios gratuitos de asistencia lingüística. [facility] [phone].    We comply with applicable federal civil rights laws and [place] laws. We do not discriminate on the basis of race, color, national origin, age, disability, sex, sexual orientation, or gender identity.            Thank you!     Thank you for choosing [facility]RG[facility]  for your care. Our goal is always to provide you with excellent care. Hearing back from our patients is one way we can continue to improve our services. Please take a few minutes to complete the written survey that you may receive in the mail after your visit with us. Thank you!             Your Updated Medication List - Protect others around you: Learn how to safely use, store and throw  "away your medicines at www.disposemymeds.org.          This list is accurate as of 6/23/18 10:56 AM.  Always use your most recent med list.                   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 Use one syringe  daily or as directed.    Type 2 diabetes, HbA1C goal < 8% (H)       blood glucose lancets standard    no brand specified    300 each    Use to test blood sugar 3 times daily or as directed.    Type 2 diabetes mellitus with diabetic neuropathy, with long-term current use of insulin (H)       blood glucose monitoring meter device kit    no brand specified    1 kit    Use to test blood sugar 3 times daily or as directed.    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 Replace " sensor every 10 days.    Type 2 diabetes mellitus with diabetic peripheral angiopathy and gangrene, with long-term current use of insulin (H)       exenatide ER 2 MG SQ pen for weelky inj    BYDUREON    4 kit    Inject 2 mg Subcutaneous every 7 days    Type 2 diabetes mellitus with peripheral neuropathy (H)       FREESTYLE LUCERO READER Iman     1 Device    1 Device as needed    Type 2 diabetes mellitus with diabetic peripheral angiopathy and gangrene, with long-term current use of insulin (H)       gabapentin 100 MG capsule    NEURONTIN     Take 300 mg by mouth At Bedtime        insulin degludec 100 UNIT/ML pen    TRESIBA    30 mL    Inject 55 Units Subcutaneous daily    Type 2 diabetes mellitus with diabetic peripheral angiopathy and gangrene, with long-term current use of insulin (H)       * insulin lispro 100 UNIT/ML injection    HumaLOG KWIKpen    30 mL    Inject 35 units sq tid.  Dispense 90-day supply or as allowed by insurance.    Type 2 diabetes mellitus with peripheral neuropathy (H)       * insulin lispro 100 UNIT/ML injection    ADMELOG SOLOSTAR    30 mL    Inject 35 units sq tid.  Dispense 90-day supply or as allowed by insurance.    Type 2 diabetes mellitus with peripheral neuropathy (H)       ketoconazole 2 % shampoo    NIZORAL    120 mL    APPLY TO THE AFFECTED AREA AND WASH OFF AFTER 5 MINUTES.    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)       MULTIVITAMIN PO      Take 1 tablet by mouth daily        OMEGA-3 FISH OIL PO      Take 2 g by mouth daily        pantoprazole 20 MG EC tablet    PROTONIX    30 tablet    Take by mouth 30-60 minutes before a meal.    Gastroesophageal reflux disease without esophagitis        Pen Needles 31G X 5 MM Misc     200 each    1 Device 5 times daily , TWICE DAILY WITH LANTUS AND 3 TIMES A DAY PRN WITH NOVOLOG FLEXPEN    Type 2 diabetes mellitus with peripheral neuropathy (H)       Semaglutide 1 MG/DOSE Sopn    OZEMPIC    1 pen    Inject 1 mg Subcutaneous once a week    Type 2 diabetes mellitus with peripheral neuropathy (H)       thin lancets    NO BRAND SPECIFIED    300 each    1 each 3 times daily.    Type 2 diabetes, HbA1C goal < 8% (H)       VITAMIN D3 PO      Take 1,000 Units by mouth daily        * Notice:  This list has 4 medication(s) that are the same as other medications prescribed for you. Read the directions carefully, and ask your doctor or other care provider to review them with you.

## 2018-07-16 ENCOUNTER — OFFICE VISIT (OUTPATIENT)
Dept: SLEEP MEDICINE | Facility: CLINIC | Age: 58
End: 2018-07-16
Attending: PSYCHIATRY & NEUROLOGY
Payer: COMMERCIAL

## 2018-07-16 VITALS
RESPIRATION RATE: 16 BRPM | DIASTOLIC BLOOD PRESSURE: 78 MMHG | BODY MASS INDEX: 34.58 KG/M2 | WEIGHT: 247 LBS | HEART RATE: 63 BPM | OXYGEN SATURATION: 98 % | SYSTOLIC BLOOD PRESSURE: 141 MMHG | HEIGHT: 71 IN

## 2018-07-16 DIAGNOSIS — G47.33 OSA (OBSTRUCTIVE SLEEP APNEA): Primary | ICD-10-CM

## 2018-07-16 DIAGNOSIS — I69.90 LATE EFFECTS OF CEREBROVASCULAR DISEASE: ICD-10-CM

## 2018-07-16 DIAGNOSIS — M54.81 OCCIPITAL NEURALGIA OF LEFT SIDE: ICD-10-CM

## 2018-07-16 PROCEDURE — 99243 OFF/OP CNSLTJ NEW/EST LOW 30: CPT | Performed by: INTERNAL MEDICINE

## 2018-07-16 NOTE — NURSING NOTE
"Chief Complaint   Patient presents with     Sleep Problem     Referred by neurologist        Initial /78  Pulse 63  Resp 16  Ht 1.793 m (5' 10.6\")  Wt 112 kg (247 lb)  SpO2 98%  BMI 34.84 kg/m2 Estimated body mass index is 34.84 kg/(m^2) as calculated from the following:    Height as of this encounter: 1.793 m (5' 10.6\").    Weight as of this encounter: 112 kg (247 lb).    Medication Reconciliation: complete    Neck circumference: 19 inches / 49 centimeters.    ESS    Amber Johnson MA      "

## 2018-07-16 NOTE — PROGRESS NOTES
Sleep Consultation:    Date on this visit: 7/16/2018    Crispin Rojas is sent by Albaro Rowland for a sleep consultation regarding sleep apnea.    Primary Physician: Aden Lopez     Chief complaint: history of stroke, previous sleep apnea     Presenting history:     Crispin Rojas has a history of stroke in 2010 when he was noted to have infarct in left facial colliculis. He underwent a PSG through Dr. Hernandez on 2/17/2010 at weight of 265 lbs showed an apnea hypopnea index of 59.8 per hour. Lowest O2 saturation was 71%. He was started on CPAP which ws beneficial. He discontinued CPAP after about 2 years. He apparently had a reduction in sleep apnea symptoms after some weight loss. Current weight is 247 lbs.     Crispin does snore frequently. Patient does have a regular bed partner. There is report of snoring and snorting.  He does not have witnessed apneas. They never sleep separately.  Patient sleeps on his side. He has occasional morning dry mouth, denies no morning headaches and restless legs. Crispin denies any bruxism, sleep walking, sleep talking, dream enactment, sleep paralysis, cataplexy and hypnogogic/hypnopompic hallucinations.    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 His sleep schedule varies depending on his schedules. Usually, hegoes to sleep at 11:00 PM during the week. He wakes up at 7:00 AM without an alarm. He falls asleep in 10 minutes.  Crispin denies difficulty falling asleep.  He wakes up 1-2 times a night for 5 minutes before falling back to sleep.  Crispin wakes up to uncertain reasons.  On weekends, Crispin goes to sleep at 11:00 PM.  He wakes up at 7:00 AM without an alarm. He falls asleep in 10 minutes.  Patient gets an average of 7-8 hours of sleep per night.     Patient does not use electronics in bed.     Crispin does not do shift work.  He works day shifts.      He denies sleep walking as a child.  Crispin denies difficulty  breathing through his nose.       Patient's [place] Sleepiness score 8/24 consistent with no daytime sleepiness.      Crispin naps 0-1 times per week for 20-30 minutes, feels refreshed after naps. He takes no inadvertant naps.  He denies closing eyes, dozing and falling asleep while driving.  Patient was counseled on the importance of driving while alert, to pull over if drowsy, or nap before getting into the vehicle if sleepy.      He uses 1 cups/day of coffee. Last caffeine intake is usually before noon.    Allergies:    Allergies   Allergen Reactions     Niacin Swelling     SIMCOR caused edema in extremities     Simvastatin Swelling     SIMCOR caused edema in extremities       Medications:    Current Outpatient Prescriptions   Medication Sig Dispense Refill     amLODIPine (NORVASC) 5 MG tablet TAKE 1 TABLET BY MOUTH EVERY DAY 90 tablet 0     atorvastatin (LIPITOR) 40 MG tablet TAKE 1 TABLET BY MOUTH EVERY DAY 90 tablet 2     blood glucose (NO BRAND SPECIFIED) lancets standard Use to test blood sugar 3 times daily or as directed. 300 each 3     blood glucose monitoring (NO BRAND SPECIFIED) meter device kit Use to test blood sugar 3 times daily or as directed. 1 kit 0     blood glucose monitoring (NO BRAND SPECIFIED) test strip Use to test blood sugars 3 times daily or as directed 300 strip 3     Cholecalciferol (VITAMIN D3 PO) Take 1,000 Units by mouth daily        CIALIS 5 MG tablet TAKE 1 TABLET BY MOUTH EVERY DAY AS NEEDED 30 tablet 0     CIALIS 5 MG tablet TAKE 1 TABLET BY MOUTH EVERY DAY AS NEEDED 30 tablet 0     Co-Enzyme Q10 100 MG CAPS Take 100 mg by mouth daily        Continuous Blood Gluc  (FREESTYLE LUCERO READER) LESLY 1 Device as needed 1 Device 1     continuous blood glucose monitoring (FREESTYLE LUCERO) sensor For use with Freestyle Lucero Flash  for continuous monitioring of blood glucose levels. Replace sensor every 10 days. 3 each 3     exenatide ER (BYDUREON) 2 MG pen Inject 2 mg  "Subcutaneous every 7 days 4 kit 3     gabapentin (NEURONTIN) 100 MG capsule Take 300 mg by mouth At Bedtime       insulin degludec (TRESIBA) 100 UNIT/ML pen Inject 55 Units Subcutaneous daily 30 mL 3     insulin lispro (ADMELOG SOLOSTAR) 100 UNIT/ML injection Inject 35 units sq tid.  Dispense 90-day supply or as allowed by insurance. 30 mL 3     insulin lispro (HUMALOG KWIKPEN) 100 UNIT/ML injection Inject 35 units sq tid.  Dispense 90-day supply or as allowed by insurance. 30 mL 3     Insulin Pen Needle (PEN NEEDLES) 31G X 5 MM MISC 1 Device 5 times daily , TWICE DAILY WITH LANTUS AND 3 TIMES A DAY PRN WITH NOVOLOG FLEXPEN 200 each 11     Insulin Syringe-Needle U-100 (BD INSULIN SYRINGE ULTRAFINE) 30G X 1/2\" 0.3 ML MISC 1 Syringe daily. Use one syringe  daily or as directed. 100 each prn     ketoconazole (NIZORAL) 2 % shampoo APPLY TO THE AFFECTED AREA AND WASH OFF AFTER 5 MINUTES. 120 mL 3     lancets thin MISC 1 each 3 times daily. 300 each 3     lisinopril (PRINIVIL/ZESTRIL) 20 MG tablet Take 1 tablet (20 mg) by mouth daily 90 tablet 3     metFORMIN (GLUCOPHAGE-XR) 500 MG 24 hr tablet TAKE 2 TABLETS BY MOUTH TWICE DAILY WITH MEALS 360 tablet 1     Multiple Vitamins-Minerals (MULTIVITAMIN PO) Take 1 tablet by mouth daily        Omega-3 Fatty Acids (OMEGA-3 FISH OIL PO) Take 2 g by mouth daily        pantoprazole (PROTONIX) 20 MG EC tablet Take by mouth 30-60 minutes before a meal. 30 tablet 1     Semaglutide (OZEMPIC) 1 MG/DOSE SOPN Inject 1 mg Subcutaneous once a week 1 pen 4       Problem List:  Patient Active Problem List    Diagnosis Date Noted     Right bundle branch block 06/23/2018     Priority: Medium     On ECG 6/23/2018, advised to f/u with PCP       Hyperlipidemia LDL goal <100 06/07/2018     Priority: Medium     Benign essential hypertension 06/07/2018     Priority: Medium     Type 2 diabetes mellitus with peripheral neuropathy (H) 11/27/2017     Priority: Medium     Chronic left shoulder pain " 01/30/2017     Priority: Medium     Cervicalgia 01/30/2017     Priority: Medium     Calculus of kidney 08/30/2016     Priority: Medium     Morbid obesity, unspecified obesity type (H) 11/04/2015     Priority: Medium     Other specified transient cerebral ischemias 11/04/2015     Priority: Medium     CARDIOVASCULAR SCREENING; LDL GOAL LESS THAN 100 10/31/2010     Priority: Medium     Disorder of bursae and tendons in shoulder region 06/28/2004     Priority: Medium     Problem list name updated by automated process. Provider to review          Past Medical/Surgical History:  Past Medical History:   Diagnosis Date     Cerebral infarction (H)      Chronic infection      H/O heartburn      History of heartburn      Hypertension      Numbness and tingling      Obesity      Renal stone      Type II or unspecified type diabetes mellitus without mention of complication, not stated as uncontrolled      Past Surgical History:   Procedure Laterality Date     AMPUTATE FOOT Left 8/18/2016    Procedure: AMPUTATE FOOT;  Surgeon: Jack Younger DPM;  Location: [facility]     AMPUTATE TOE(S) Right 2/1/2016    Procedure: AMPUTATE TOE(S);  Surgeon: Rachelle Manriquez DPM, Pod;  Location: [facility] OR     ANGIOGRAM       COLONOSCOPY       COMBINED CYSTOSCOPY, RETROGRADES, URETEROSCOPY, INSERT STENT Left 8/21/2016    Procedure: COMBINED CYSTOSCOPY, RETROGRADES, URETEROSCOPY, INSERT STENT;  Surgeon: Artemio Valenzuela MD;  Location: [facility] OR     COMBINED CYSTOSCOPY, RETROGRADES, URETEROSCOPY, LASER HOLMIUM LITHOTRIPSY URETER(S), INSERT STENT Left 10/3/2016    Procedure: COMBINED CYSTOSCOPY, RETROGRADES, URETEROSCOPY, LASER HOLMIUM LITHOTRIPSY URETER(S), INSERT STENT;  Surgeon: Artemio Valenzuela MD;  Location: [facility]     EXTRACORPOREAL SHOCK WAVE LITHOTRIPSY (ESWL) Left 9/8/2016    Procedure: EXTRACORPOREAL SHOCK WAVE LITHOTRIPSY (ESWL);  Surgeon: Artemio Valenzuela MD;  Location: [facility]     RECESSION GASTROCNEMIUS Right 2/1/2016     Procedure: RECESSION GASTROCNEMIUS;  Surgeon: Rachelle Manriquez DPM, Pod;  Location: [facility] OR       Social History:  Social History     Social History     Marital status:      Spouse name: Sydney     Number of children: 2     Years of education: N/A     Occupational History     marketing [employer]     Social History Main Topics     Smoking status: Never Smoker     Smokeless tobacco: Never Used     Alcohol use 0.0 oz/week     0 Standard drinks or equivalent per week      Comment: rare---red wine 2x per week     Drug use: No     Sexual activity: Yes     Partners: Female     Other Topics Concern     Parent/Sibling W/ Cabg, Mi Or Angioplasty Before 65f 55m? No     Social History Narrative       Family History:    Mother has insomnia and daytime sleepiness.   Family History   Problem Relation Age of Onset     Arthritis Mother      SLE     Connective Tissue Disorder Mother      lupus     Diabetes Mother      Cerebrovascular Disease Mother      Cancer Mother      Diabetes Father      Diabetes Maternal Grandfather      Diabetes Sister        Review of Systems:  A complete review of systems reviewed by me is negative with the exeption of what has been mentioned in the history of present illness.  CONSTITUTIONAL:  POSITIVE for  weight gain  EYES: NEGATIVE for changes in vision, blind spots, double vision.  ENT:  POSITIVE for  ear pain  CARDIAC: NEGATIVE for fast heartbeats or fluttering in chest, chest pain or pressure, breathlessness when lying flat, swollen legs or swollen feet.  NEUROLOGIC: NEGATIVE headaches, weakness or numbness in the arms or legs.  DERMATOLOGIC: NEGATIVE for rashes, new moles or change in mole(s)  PULMONARY: NEGATIVE SOB at rest, SOB with activity, dry cough, productive cough, coughing up blood, wheezing or whistling when breathing.    GASTROINTESTINAL: NEGATIVE for nausea or vomitting, loose or watery stools, fat or grease in stools, constipation, abdominal pain, bowel movements  "black in color or blood noted.  GENITOURINARY: NEGATIVE for pain during urination, blood in urine, urinating more frequently than usual, irregular menstrual periods.  MUSCULOSKELETAL:  POSITIVE for  bone or joint pain  ENDOCRINE: NEGATIVE for increased thirst or urination, diabetes.  LYMPHATIC: NEGATIVE for swollen lymph nodes, lumps or bumps in the breasts or nipple discharge.    Physical Examination:  Vitals: /78  Pulse 63  Resp 16  Ht 1.793 m (5' 10.6\")  Wt 112 kg (247 lb)  SpO2 98%  BMI 34.84 kg/m2  BMI= Body mass index is 34.84 kg/(m^2).       No flowsheet data found.         GENERAL APPEARANCE: healthy, alert and no distress  EYES: Eyes grossly normal to inspection, PERRL and conjunctivae and sclerae normal  HENT: nose and mouth without ulcers or lesions, oropharynx crowded, soft palate dependent and tongue base enlarged  NECK: no adenopathy, no asymmetry, masses, or scars and thyroid normal to palpation  RESP: lungs clear to auscultation - no rales, rhonchi or wheezes  CV: regular rates and rhythm, normal S1 S2, no S3 or S4 and no murmur, click or rub  MS: extremities normal- no gross deformities noted  NEURO: Normal strength and tone, mentation intact and speech normal  PSYCH: mentation appears normal and affect normal/bright  Mallampati Class: IV.  Tonsillar Stage: 1  hidden by pillars.    Impression/Plan:    1. Obstructive sleep apnea  2. Hypertension  3. DM-2  4. History of stroke     - Patient was previously diagnosed with severe sleep apnea in 2010 at weight of 265 lbs. Patient discontinued CPAP therapy after a few years after some weight loss. He continues to have sleep apnea symptoms. Current weight is 247 lbs. Medical comorbidity includes hypertension, DM-2 and history of stroke. A re evaluation of sleep disordered breathing is recommended to objectively assess current severity of his sleep apnea and plan appropriate management.     Plan:     1. Split night PSG for reassessment of sleep " apnea       He will follow up with me in approximately two weeks after his sleep study has been competed to review the results and discuss plan of care.       Polysomnography reviewed.  Obstructive sleep apnea reviewed.  Complications of untreated sleep apnea were reviewed.    I spent a total of 40 minutes with patient with carmelina Hurt in counseling     David Evans     CC: Albaro Rowland

## 2018-07-16 NOTE — MR AVS SNAPSHOT
After Visit Summary   7/16/2018    Crispin Rojas    MRN: [id number]           Patient Information     Date Of Birth          1960        Visit Information        Provider Department      7/16/2018 8:30 AM David Evans MD [facility]        Today's Diagnoses     GILA (obstructive sleep apnea)    -  1    Ear pain        Occipital neuralgia of left side        Late effects of cerebrovascular disease          Care Instructions      Your BMI is Body mass index is 34.84 kg/(m^2).  Weight management is a personal decision.  If you are interested in exploring weight loss strategies, the following discussion covers the approaches that may be successful. Body mass index (BMI) is one way to tell whether you are at a healthy weight, overweight, or obese. It measures your weight in relation to your height.  A BMI of 18.5 to 24.9 is in the healthy range. A person with a BMI of 25 to 29.9 is considered overweight, and someone with a BMI of 30 or greater is considered obese. More than two-thirds of American adults are considered overweight or obese.  Being overweight or obese increases the risk for further weight gain. Excess weight may lead to heart disease and diabetes.  Creating and following plans for healthy eating and physical activity may help you improve your health.  Weight control is part of healthy lifestyle and includes exercise, emotional health, and healthy eating habits. Careful eating habits lifelong are the mainstay of weight control. Though there are significant health benefits from weight loss, long-term weight loss with diet alone may be very difficult to achieve- studies show long-term success with dietary management in less than 10% of people. Attaining a healthy weight may be especially difficult to achieve in those with severe obesity. In some cases, medications, devices and surgical management might be considered.  What can you do?  If you are overweight or obese and  are interested in methods for weight loss, you should discuss this with your provider.     Consider reducing daily calorie intake by 500 calories.     Keep a food journal.     Avoiding skipping meals, consider cutting portions instead.    Diet combined with exercise helps maintain muscle while optimizing fat loss. Strength training is particularly important for building and maintaining muscle mass. Exercise helps reduce stress, increase energy, and improves fitness. Increasing exercise without diet control, however, may not burn enough calories to loose weight.       Start walking three days a week 10-20 minutes at a time    Work towards walking thirty minutes five days a week     Eventually, increase the speed of your walking for 1-2 minutes at time    In addition, we recommend that you review healthy lifestyles and methods for weight loss available through the National Institutes of Health patient information sites:  http://win.niddk.nih.gov/publications/index.htm    And look into health and wellness programs that may be available through your health insurance provider, employer, local community center, or kathrine club.    Weight management plan: Patient was referred to their PCP to discuss a diet and exercise plan.              Follow-ups after your visit        Your next 10 appointments already scheduled     Jul 23, 2018  8:00 AM CDT   Nurse Only with ADDISON SCHRADER   [facility]    [address] [facility] [id number] [phone]            Sep 11, 2018  8:30 PM CDT   PSG Split with BED 4 SH SLEEP   [facility]    [address] [address]   [phone]            Sep 21, 2018 12:30 PM CDT   Return Sleep Patient with David Evans MD   [facility] ([facility])    [address] [address]  "  [phone]            Oct 11, 2018  7:00 AM CDT   Return Visit with SARAH Raymundo CNP   [facility]    [address]  [facility] [address] [phone]              Future tests that were ordered for you today     Open Future Orders        Priority Expected Expires Ordered    Comprehensive Sleep Study Routine  1/12/2019 7/16/2018            Who to contact     If you have questions or need follow up information about today's clinic visit or your schedule please contact [facility] directly at [phone].  Normal or non-critical lab and imaging results will be communicated to you by MyChart, letter or phone within 4 business days after the clinic has received the results. If you do not hear from us within 7 days, please contact the clinic through [employer] or phone. If you have a critical or abnormal lab result, we will notify you by phone as soon as possible.  Submit refill requests through [employer] or call your pharmacy and they will forward the refill request to us. Please allow 3 business days for your refill to be completed.          Additional Information About Your Visit        [employer] Information     [employer] gives you secure access to your electronic health record. If you see a primary care provider, you can also send messages to your care team and make appointments. If you have questions, please call your primary care clinic.  If you do not have a primary care provider, please call [phone] and they will assist you.        Care EveryWhere ID     This is your Care EveryWhere ID. This could be used by other organizations to access your [facility] medical records  [id number]        Your Vitals Were     Pulse Respirations Height Pulse Oximetry BMI (Body Mass Index)       63 16 1.793 m (5' 10.6\") 98% 34.84 kg/m2        Blood Pressure from Last 3 Encounters:   07/16/18 141/78   06/23/18 130/80   06/07/18 125/68    " Weight from Last 3 Encounters:   07/16/18 112 kg (247 lb)   06/23/18 113.4 kg (250 lb)   06/21/18 113.5 kg (250 lb 5 oz)              We Performed the Following     SLEEP EVALUATION & MANAGEMENT REFERRAL - ADULT -[facility] [phone]  (Age 18 and up)        Primary Care Provider Office Phone # Fax #    Aden Lopez [phone] [address] [facility] [address]        Equal Access to Services     [facility] Hospital[facility] : Hadii aad ku hadasho Soomaali, waaxda luqadaha, qaybta kaalmada adeegyada, waxay jerin hayarya moran . So [facility] [phone].    ATENCIÓN: Si habla español, tiene a gomez disposición servicios gratuitos de asistencia lingüística. [facility] [phone].    We comply with applicable federal civil rights laws and [place] laws. We do not discriminate on the basis of race, color, national origin, age, disability, sex, sexual orientation, or gender identity.            Thank you!     Thank you for choosing [facility]  for your care. Our goal is always to provide you with excellent care. Hearing back from our patients is one way we can continue to improve our services. Please take a few minutes to complete the written survey that you may receive in the mail after your visit with us. Thank you!             Your Updated Medication List - Protect others around you: Learn how to safely use, store and throw away your medicines at www.disposemymeds.org.          This list is accurate as of 7/16/18  9:25 AM.  Always use your most recent med list.                   Brand Name Dispense Instructions for use Diagnosis    amLODIPine 5 MG tablet    NORVASC    90 tablet    TAKE 1 TABLET BY MOUTH EVERY DAY    Other specified transient cerebral ischemias       atorvastatin 40 MG tablet    LIPITOR    90 tablet    TAKE 1 TABLET BY MOUTH EVERY DAY    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), Hyperlipidemia LDL goal <100  "      BD insulin syringe ultrafine 30G X 1/2\" 0.3 ML   Generic drug:  insulin syringe-needle U-100     100 each    1 Syringe daily. Use one syringe  daily or as directed.    Type 2 diabetes, HbA1C goal < 8% (H)       blood glucose lancets standard    no brand specified    300 each    Use to test blood sugar 3 times daily or as directed.    Type 2 diabetes mellitus with diabetic neuropathy, with long-term current use of insulin (H)       blood glucose monitoring meter device kit    no brand specified    1 kit    Use to test blood sugar 3 times daily or as directed.    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 Replace sensor every 10 days.    Type 2 diabetes mellitus with diabetic peripheral angiopathy and gangrene, with long-term current use of insulin (H)       exenatide ER 2 MG SQ pen for weelky inj    BYDUREON    4 kit    Inject 2 mg Subcutaneous every 7 days    Type 2 diabetes mellitus with peripheral neuropathy (H)       FREESTYLE CORWIN READER Iman     1 Device    1 Device as needed    Type 2 diabetes mellitus with diabetic peripheral angiopathy and gangrene, with long-term current use of insulin (H)       gabapentin 100 MG capsule    " NEURONTIN     Take 300 mg by mouth At Bedtime        insulin degludec 100 UNIT/ML pen    TRESIBA    30 mL    Inject 55 Units Subcutaneous daily    Type 2 diabetes mellitus with diabetic peripheral angiopathy and gangrene, with long-term current use of insulin (H)       * insulin lispro 100 UNIT/ML injection    HumaLOG KWIKpen    30 mL    Inject 35 units sq tid.  Dispense 90-day supply or as allowed by insurance.    Type 2 diabetes mellitus with peripheral neuropathy (H)       * insulin lispro 100 UNIT/ML injection    ADMELOG SOLOSTAR    30 mL    Inject 35 units sq tid.  Dispense 90-day supply or as allowed by insurance.    Type 2 diabetes mellitus with peripheral neuropathy (H)       ketoconazole 2 % shampoo    NIZORAL    120 mL    APPLY TO THE AFFECTED AREA AND WASH OFF AFTER 5 MINUTES.    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)       MULTIVITAMIN PO      Take 1 tablet by mouth daily        OMEGA-3 FISH OIL PO      Take 2 g by mouth daily        pantoprazole 20 MG EC tablet    PROTONIX    30 tablet    Take by mouth 30-60 minutes before a meal.    Gastroesophageal reflux disease without esophagitis       Pen Needles 31G X 5 MM Misc     200 each    1 Device 5 times daily , TWICE DAILY WITH LANTUS AND 3 TIMES A DAY PRN WITH NOVOLOG FLEXPEN    Type 2 diabetes mellitus with peripheral neuropathy (H)       Semaglutide 1 MG/DOSE Sopn    OZEMPIC    1 pen    Inject 1 mg Subcutaneous once a week    Type 2 diabetes mellitus with peripheral neuropathy (H)       thin lancets    NO BRAND SPECIFIED    300 each    1 each 3 times daily.    Type 2 diabetes, HbA1C goal < 8% (H)       VITAMIN D3 PO      Take 1,000 Units by mouth daily        *  Notice:  This list has 4 medication(s) that are the same as other medications prescribed for you. Read the directions carefully, and ask your doctor or other care provider to review them with you.

## 2018-07-16 NOTE — PATIENT INSTRUCTIONS

## 2018-07-23 ENCOUNTER — ALLIED HEALTH/NURSE VISIT (OUTPATIENT)
Dept: NURSING | Facility: CLINIC | Age: 58
End: 2018-07-23

## 2018-07-23 VITALS — WEIGHT: 245 LBS | BODY MASS INDEX: 34.56 KG/M2

## 2018-07-23 DIAGNOSIS — E66.01 MORBID OBESITY, UNSPECIFIED OBESITY TYPE (H): Primary | ICD-10-CM

## 2018-07-23 DIAGNOSIS — E11.42 TYPE 2 DIABETES MELLITUS WITH PERIPHERAL NEUROPATHY (H): ICD-10-CM

## 2018-07-23 PROCEDURE — 99207 ZZC HEALTH COACHING, NO CHARGE: CPT

## 2018-07-23 NOTE — PROGRESS NOTES
July 23, 2018    [address] [id number] [phone]  Health Coaching Progress Note    Patient Name: Crispin Rojas Date: July 23, 2018      Session Length: 30      DATA    PRM Master Survey Scores Reviewed: Yes    Core Healthy Days Survey:         ROCHELLE Score (Last Two) 6/21/2018   ROCHELLE Raw Score 30   Activation Score 56   ROCHELLE Level 3       PHQ-2 Score 6/21/2018 3/20/2018   PHQ-2 Total Score Interpretation - Positive if 3 or more points; Administer PHQ-9 if positive 0 0   MyC PHQ-2 Score - 0       PHQ-9 SCORE 3/20/2018 5/9/2018   Total Score MyChart Incomplete -   Total Score - 0       Treatment Objective(s) Addressed in This Session:  Target Behavior(s): diet/weight loss and disease management/lifestyle changes of working on getting back to old healthy habits-being more aware of dietary choices by reducing carb intake, adding in more fresh foods and protein, trying to find ways to be more active with busy schedule-walking more during work or between jobs, getting more sleep each night, managing diabetes well, losing weight and maintaining, accessing resources and scheduling/attending appointments as needed.     Current Stressors / Issues:  Finding time to be active, being more disciplined with food choices, not getting enough sleep, unexplained spikes in blood sugar, ongoing shoulder pain-arthritis/limits mobility, loves competitive sports (golf)/activities but hasn't been able to participate due to time constraints/injuries, work several jobs to pay the bills, wife is a good cook-eats too well sometimes, started gaining weight with insulin use, wants to lose weight and maintain it, managing diabetes well/avoiding other medications  What Patient Does Well:   1) Patient has had success with lifestyle changes in the past and feels he knows what he needs to do, but just struggles to find time to fit everything  into his routine  Previous Successes:   1) Patient was down 5lbs from last visit by watching his dietary intake and being more active each day.  Areas in Need of Improvement:   1) Maintaining a regular exercise routine  2) Making healthier dietary choices-more fresh foods, protein-cutting back on carbs  3) Losing weight and keeping it off  4) Managing shoulder pain/regaining strength  Barriers to Change:   1) Shoulder injury  2) Works several different jobs/time is limited  3) Bad cycle of losing a lot of weight and then gaining it all back  Reasons for Change:   1) Lose weight  2) Continue to manage diabetes  3) Figure out something with his shoulder pain so he can play golf again  Plan/Goal for the Next 4 Weeks:   GOAL #1: Be more aware of what you're eating at meals and snacks/work on reducing carbs, eat more fresh food items and add in more protein each day  GOAL #1 Progress Toward Goal: 50% (Has been doing much better with his choices/avoiding unhealthy foods/or high carb all together and smaller portions)  GOAL #2: Start using the [employer] and start tracking steps each day/find your daily average  GOAL #2 Progress Toward Goal: Goal no longer appropriate (He can't use his phone at work so he feels this is not an accurate measure for his activity)  GOAL #3: Continue to fit in more physical activity each day as able/keep moving  GOAL #3 Progress Toward Goal: 0% New Goal  GOAL #4: Keep working on sleep schedule/try to get close to 8 hours each night as able  GOAL #4 Progress Toward Goal: 0% New Goal  Intervention:  Motivational Interviewing    MI Intervention: Expressed Empathy/Understanding, Supported Autonomy, Collaboration, Evocation, Permission to raise concern or advise, Open-ended questions, Reflections: simple and complex and Change talk (evoked)     Change Talk Expressed by the Patient: Desire to change Reasons to change Need to change Committment to change Activation Taking steps    Provider Response  to Change Talk: E - Evoked more info from patient about behavior change, A - Affirmed patient's thoughts, decisions, or attempts at behavior change, R - Reflected patient's change talk and S - Summarized patient's change talk statements    Assessment / Progress on Treatment Objective(s) / Homework:    New Objective established this session - PREPARATION (Decided to change - considering how); Intervened by negotiating a change plan and determining options / strategies for behavior change, identifying triggers, exploring social supports, and working towards setting a date to begin behavior change  No improvement - CONTEMPLATION (Considering change and yet undecided); Intervened by assessing the negative and positive thinking (ambivalence) about behavior change  Satisfactory progress - ACTION (Actively working towards change); Intervened by reinforcing change plan / affirming steps taken         Plan: (Homework, other):  Patient was encouraged to continue to seek condition-related information and education, as well as schedule a follow up appointment with the Health  in 4 weeks. Patient has set self-identified goals and will monitor progress until the next appointment.  Scheduled our next coaching for Friday August 24th at 8am at the [facility].  Juan M Oshea

## 2018-07-23 NOTE — MR AVS SNAPSHOT
After Visit Summary   7/23/2018    Crispin Rojas    MRN: [id number]           Patient Information     Date Of Birth          1960        Visit Information        Provider Department      7/23/2018 8:00 AM JUAN M SCHRADER [facility]        Care Instructions      July 23, 2018    [facility]  [address] [address]  [phone]  [phone]  Health Coaching Progress Note    Patient Name: Crispin Rojas Date: July 23, 2018          Plan/Goal for the Next 4 Weeks:   GOAL #1: Be more aware of what you're eating at meals and snacks/work on reducing carbs, eat more fresh food items and add in more protein each day  GOAL #2: Continue to fit in more activity each day as able/keep moving-New Goal  GOAL #3: Keep working on sleep schedule/try to get close to 8 hours each night as able-New Goal      Plan: (Homework, other):  Patient was encouraged to continue to seek condition-related information and education, as well as schedule a follow up appointment with the Health  in 4 weeks. Patient has set self-identified goals and will monitor progress until the next appointment.  Scheduled our next coaching for Friday August 24th at 8am at the [facility].  Juan M Schrader       Resources:                Follow-ups after your visit        Your next 10 appointments already scheduled     Aug 24, 2018  8:00 AM CDT   Nurse Only with JUAN M SCHRADER   [facility] ([facility])    [address] [address]   [phone]            Sep 11, 2018  8:30 PM CDT   PSG Split with BED 4 SH SLEEP   [facility] ([facility])    [address]  [facility] [address]   [phone]            Sep 21, 2018 12:30 PM CDT   Return Sleep Patient with David Evans MD   [facility] ([facility]  [facility]    [address] [address]   [phone]            Oct 11, 2018  7:00 AM CDT   Return Visit with SARAH Raymundo CNP   [facility] ([facility])    [address]  [facility] [address] [phone]              Who to contact     If you have questions or need follow up information about today's clinic visit or your schedule please contact [facility] directly at [phone].  Normal or non-critical lab and imaging results will be communicated to you by Flared3Dhart, letter or phone within 4 business days after the clinic has received the results. If you do not hear from us within 7 days, please contact the clinic through Idea2t or phone. If you have a critical or abnormal lab result, we will notify you by phone as soon as possible.  Submit refill requests through AeternusLED or call your pharmacy and they will forward the refill request to us. Please allow 3 business days for your refill to be completed.          Additional Information About Your Visit        MyChart Information     Aeternus[employer] gives you secure access to your electronic health record. If you see a primary care provider, you can also send messages to your care team and make appointments. If you have questions, please call your primary care clinic.  If you do not have a primary care provider, please call [phone] and they will assist you.        Care EveryWhere ID     This is your Care EveryWhere ID. This could be used by other organizations to access your [place] medical records  [id number]        Your Vitals Were     BMI (Body Mass Index)                   34.56 kg/m2            Blood Pressure from Last 3 Encounters:   07/16/18 141/78   06/23/18 130/80   06/07/18 125/68    Weight from Last 3 Encounters:   07/23/18 245 lb (111.1 kg)   07/16/18 247 lb (112 kg)   06/23/18 250 lb (113.4 kg)              Today, you had the following   "   No orders found for display       Primary Care Provider Office Phone # Fax #    Aden Ethan Lopez [phone] [address] [address]        Equal Access to Services     ELI MARTÍNEZ : Hadderick chandler ku norriso Soivonneali, waaxda luqadaha, qaybta kaalmada adeegyada, blanca johnsonn sabina killian laLavonnearya rhodes. So [facility] [phone].    ATENCIÓN: Si habla español, tiene a gomez disposición servicios gratuitos de asistencia lingüística. Llame al [phone].    We comply with applicable federal civil rights laws and [place] laws. We do not discriminate on the basis of race, color, national origin, age, disability, sex, sexual orientation, or gender identity.            Thank you!     Thank you for choosing [facility]  for your care. Our goal is always to provide you with excellent care. Hearing back from our patients is one way we can continue to improve our services. Please take a few minutes to complete the written survey that you may receive in the mail after your visit with us. Thank you!             Your Updated Medication List - Protect others around you: Learn how to safely use, store and throw away your medicines at www.disposemymeds.org.          This list is accurate as of 7/23/18  8:40 AM.  Always use your most recent med list.                   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 Use one syringe  daily or as directed.    Type 2 diabetes, HbA1C goal < 8% (H)       blood glucose lancets standard "    no brand specified    300 each    Use to test blood sugar 3 times daily or as directed.    Type 2 diabetes mellitus with diabetic neuropathy, with long-term current use of insulin (H)       blood glucose monitoring meter device kit    no brand specified    1 kit    Use to test blood sugar 3 times daily or as directed.    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 Replace sensor every 10 days.    Type 2 diabetes mellitus with diabetic peripheral angiopathy and gangrene, with long-term current use of insulin (H)       exenatide ER 2 MG SQ pen for weelky inj    BYDUREON    4 kit    Inject 2 mg Subcutaneous every 7 days    Type 2 diabetes mellitus with peripheral neuropathy (H)       FREESTYLE CORWIN READER Iman     1 Device    1 Device as needed    Type 2 diabetes mellitus with diabetic peripheral angiopathy and gangrene, with long-term current use of insulin (H)       gabapentin 100 MG capsule    NEURONTIN     Take 300 mg by mouth At Bedtime        insulin degludec 100 UNIT/ML pen    TRESIBA    30 mL    Inject 55 Units Subcutaneous daily    Type 2 diabetes mellitus with diabetic peripheral angiopathy and gangrene, with long-term current  use of insulin (H)       * insulin lispro 100 UNIT/ML injection    HumaLOG KWIKpen    30 mL    Inject 35 units sq tid.  Dispense 90-day supply or as allowed by insurance.    Type 2 diabetes mellitus with peripheral neuropathy (H)       * insulin lispro 100 UNIT/ML injection    ADMELOG SOLOSTAR    30 mL    Inject 35 units sq tid.  Dispense 90-day supply or as allowed by insurance.    Type 2 diabetes mellitus with peripheral neuropathy (H)       ketoconazole 2 % shampoo    NIZORAL    120 mL    APPLY TO THE AFFECTED AREA AND WASH OFF AFTER 5 MINUTES.    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)       MULTIVITAMIN PO      Take 1 tablet by mouth daily        OMEGA-3 FISH OIL PO      Take 2 g by mouth daily        pantoprazole 20 MG EC tablet    PROTONIX    30 tablet    Take by mouth 30-60 minutes before a meal.    Gastroesophageal reflux disease without esophagitis       Pen Needles 31G X 5 MM Misc     200 each    1 Device 5 times daily , TWICE DAILY WITH LANTUS AND 3 TIMES A DAY PRN WITH NOVOLOG FLEXPEN    Type 2 diabetes mellitus with peripheral neuropathy (H)       Semaglutide 1 MG/DOSE Sopn    OZEMPIC    1 pen    Inject 1 mg Subcutaneous once a week    Type 2 diabetes mellitus with peripheral neuropathy (H)       thin lancets    NO BRAND SPECIFIED    300 each    1 each 3 times daily.    Type 2 diabetes, HbA1C goal < 8% (H)       VITAMIN D3 PO      Take 1,000 Units by mouth daily        * Notice:  This list has 4 medication(s) that are the same as other medications prescribed for you. Read the directions carefully, and ask your doctor or other care provider to review them with you.

## 2018-07-23 NOTE — PATIENT INSTRUCTIONS
July 23, 2018    [address] [address]  [phone]  Health Coaching Progress Note    Patient Name: Crispin Rojas Date: July 23, 2018          Plan/Goal for the Next 4 Weeks:   GOAL #1: Be more aware of what you're eating at meals and snacks/work on reducing carbs, eat more fresh food items and add in more protein each day  GOAL #2: Continue to fit in more physical activity each day as able/keep moving-New Goal  GOAL #3: Keep working on sleep schedule/try to get close to 8 hours each night as able-New Goal      Plan: (Homework, other):  Patient was encouraged to continue to seek condition-related information and education, as well as schedule a follow up appointment with the Health  in 4 weeks. Patient has set self-identified goals and will monitor progress until the next appointment.  Scheduled our next coaching for Friday August 24th at 8am at the [facility].  Juan M Oshea       Resources:

## 2018-07-29 DIAGNOSIS — L21.9 SEBORRHEIC DERMATITIS: ICD-10-CM

## 2018-07-30 NOTE — TELEPHONE ENCOUNTER
"Requested Prescriptions   Pending Prescriptions Disp Refills     ketoconazole (NIZORAL) 2 % shampoo [Pharmacy Med Name: KETOCONAZOLE 2% SHAMPOO 120ML] 120 mL 0    Last Written Prescription Date:  4/6/18  Last Fill Quantity: 120mL  ,  # refills: 3   Last Office Visit: 5/9/2018   Future Office Visit:    Next 5 appointments (look out 90 days)     Aug 24, 2018  8:00 AM CDT   Nurse Only with ADDISON SCHRADER   [facility]    [address] [place] [address]   [phone]            Oct 11, 2018  7:00 AM CDT   Return Visit with SARAH Raymundo CNP   [facility] ([address] [address]   [phone]                  Sig: APPLY TO THE AFFECTED AREA AND WASH OFF AFTER 5 MINUTES.    Antifungal Agents Passed    7/29/2018  2:16 PM       Passed - Recent (12 mo) or future (30 days) visit within the authorizing provider's specialty    Patient had office visit in the last 12 months or has a visit in the next 30 days with authorizing provider or within the authorizing provider's specialty.  See \"Patient Info\" tab in inbasket, or \"Choose Columns\" in Meds & Orders section of the refill encounter.           Passed - Not Fluconazole or Terconazole     If oral Fluconazole or Terconazole, may refill if indicated in progress notes.             "

## 2018-07-31 RX ORDER — KETOCONAZOLE 20 MG/ML
SHAMPOO TOPICAL
Qty: 120 ML | Refills: 1 | Status: SHIPPED | OUTPATIENT
Start: 2018-07-31 | End: 2018-10-20

## 2018-08-04 DIAGNOSIS — G45.8 OTHER SPECIFIED TRANSIENT CEREBRAL ISCHEMIAS: ICD-10-CM

## 2018-08-04 NOTE — TELEPHONE ENCOUNTER
"Requested Prescriptions   Pending Prescriptions Disp Refills     amLODIPine (NORVASC) 5 MG tablet [Pharmacy Med Name: AMLODIPINE BESYLATE 5MG TABLETS]  Last Written Prescription Date:  5/1/2018  Last Fill Quantity: 90 tablet,  # refills: 0   Last office visit: 5/9/2018 with prescribing provider:  Jessica     Future Office Visit:   Next 5 appointments (look out 90 days)     Aug 24, 2018  8:00 AM CDT   Nurse Only with ADDISON SCHRADER   [facility])    [address] [place] [address]   [phone]            Oct 11, 2018  7:00 AM CDT   Return Visit with SARAH Raymundo CNP   [facility] ([address] [address]   [phone]                  90 tablet 0     Sig: TAKE 1 TABLET BY MOUTH EVERY DAY    Calcium Channel Blockers Protocol  Failed    8/4/2018 12:14 PM       Failed - Blood pressure under 140/90 in past 12 months    BP Readings from Last 3 Encounters:   07/16/18 141/78   06/23/18 130/80   06/07/18 125/68                Passed - Recent (12 mo) or future (30 days) visit within the authorizing provider's specialty    Patient had office visit in the last 12 months or has a visit in the next 30 days with authorizing provider or within the authorizing provider's specialty.  See \"Patient Info\" tab in inbasket, or \"Choose Columns\" in Meds & Orders section of the refill encounter.           Passed - Patient is age 18 or older       Passed - Normal serum creatinine on file in past 12 months    Recent Labs   Lab Test  06/23/18   1005   CR  0.96               "

## 2018-08-06 RX ORDER — AMLODIPINE BESYLATE 5 MG/1
TABLET ORAL
Qty: 90 TABLET | Refills: 0 | Status: SHIPPED | OUTPATIENT
Start: 2018-08-06 | End: 2019-01-07

## 2018-08-06 NOTE — TELEPHONE ENCOUNTER
Routing refill request to provider for review/approval because:  Last BP not at goal.    Tara PATEL RN, BSN, PHN  [facility] RN

## 2018-08-27 ENCOUNTER — TELEPHONE (OUTPATIENT)
Dept: NURSING | Facility: CLINIC | Age: 58
End: 2018-08-27

## 2018-08-27 NOTE — TELEPHONE ENCOUNTER
Outreached patient to follow-up on recent no show and was able to connect. Patient agrees to reschedule for 9/12/2018.    Juan M Oshea, Health    8/27/2018    4:13 PM

## 2018-08-28 DIAGNOSIS — E11.42 TYPE 2 DIABETES MELLITUS WITH PERIPHERAL NEUROPATHY (H): Primary | ICD-10-CM

## 2018-08-28 DIAGNOSIS — G47.33 OSA (OBSTRUCTIVE SLEEP APNEA): Primary | ICD-10-CM

## 2018-08-28 NOTE — TELEPHONE ENCOUNTER
Last Written Prescription Date:  4/03/18  Last Fill Quantity: 30 mL,  # refills: 3   Last office visit: 6/7/2018 with prescribing provider:  Chandler   Future Office Visit:   Next 5 appointments (look out 90 days)     Sep 12, 2018  8:00 AM CDT   Nurse Only with ADDISON SCHRADER   [facility]    [id number] [place] [address]   [phone]            Oct 11, 2018  7:00 AM CDT   Return Visit with SARAH Raymundo CNP   [facility] ([facility])    [address] [place] [id number] [phone]                 Requested Prescriptions   Pending Prescriptions Disp Refills     HUMALOG KWIKPEN 100 UNIT/ML soln [Pharmacy Med Name: HUMALOG 100 U/ML KWIK PEN INJ 3ML] 30 mL 0     Sig: INJECT 35 UNITS SUB-Q THREE TIMES DAILY    Short Acting Insulin Protocol Failed    8/28/2018 12:08 PM       Failed - Blood pressure less than 140/90 in past 6 months    BP Readings from Last 3 Encounters:   07/16/18 141/78   06/23/18 130/80   06/07/18 125/68                Passed - LDL on file in past 12 months    Recent Labs   Lab Test  12/27/17   1001   LDL  37            Passed - Microalbumin on file in past 12 months    Recent Labs   Lab Test  12/27/17   1002  12/17/14   MICROALB   --    --   10   MICROL  23   < >   --    UMALCR  24.55*   < >   --     < > = values in this interval not displayed.            Passed - Serum creatinine on file in past 12 months    Recent Labs   Lab Test  06/23/18   1005   01/25/16   0704   CR  0.96   < >   --    CREAT   --    --   0.8    < > = values in this interval not displayed.            Passed - HgbA1C in past 3 or 6 months    If HgbA1C is 8 or greater, it needs to be on file within the past 3 months.  If less than 8, must be on file within the past 6 months.     Recent Labs   Lab Test  03/20/18   0938   A1C  7.5*            Passed - Patient is age 18 or older       Passed - Recent (6  "mo) or future (30 days) visit within the authorizing provider's specialty    Patient had office visit in the last 6 months or has a visit in the next 30 days with authorizing provider or within the authorizing provider's specialty.  See \"Patient Info\" tab in inbasket, or \"Choose Columns\" in Meds & Orders section of the refill encounter.              "

## 2018-08-29 RX ORDER — INSULIN LISPRO 100 [IU]/ML
INJECTION, SOLUTION INTRAVENOUS; SUBCUTANEOUS
Qty: 30 ML | Refills: 3 | Status: SHIPPED | OUTPATIENT
Start: 2018-08-29 | End: 2019-03-29

## 2018-08-31 ENCOUNTER — TELEPHONE (OUTPATIENT)
Dept: SLEEP MEDICINE | Facility: CLINIC | Age: 58
End: 2018-08-31

## 2018-08-31 NOTE — TELEPHONE ENCOUNTER
Patient will call back to schedule an HST as his in lab study isn't covered by his insurance. Patient's mother recently passed away and patient will call back to schedule an HST. He wants to keep the follow-up on 09/21/18 for his HST follow-up.    Goldie Kelly

## 2018-09-07 DIAGNOSIS — I10 ESSENTIAL HYPERTENSION: ICD-10-CM

## 2018-09-07 RX ORDER — LISINOPRIL 20 MG/1
20 TABLET ORAL DAILY
Qty: 90 TABLET | Refills: 0 | Status: SHIPPED | OUTPATIENT
Start: 2018-09-07 | End: 2019-01-15

## 2018-09-07 NOTE — TELEPHONE ENCOUNTER
"Requested Prescriptions   Pending Prescriptions Disp Refills     lisinopril (PRINIVIL/ZESTRIL) 20 MG tablet 90 tablet 3     Sig: Take 1 tablet (20 mg) by mouth daily    ACE Inhibitors (Including Combos) Protocol Failed    9/7/2018  2:21 PM       Failed - Blood pressure under 140/90 in past 12 months    BP Readings from Last 3 Encounters:   07/16/18 141/78   06/23/18 130/80   06/07/18 125/68                Passed - Recent (12 mo) or future (30 days) visit within the authorizing provider's specialty    Patient had office visit in the last 12 months or has a visit in the next 30 days with authorizing provider or within the authorizing provider's specialty.  See \"Patient Info\" tab in inbasket, or \"Choose Columns\" in Meds & Orders section of the refill encounter.           Passed - Patient is age 18 or older       Passed - Normal serum creatinine on file in past 12 months    Recent Labs   Lab Test  06/23/18   1005   01/25/16   0704   CR  0.96   < >   --    CREAT   --    --   0.8    < > = values in this interval not displayed.            Passed - Normal serum potassium on file in past 12 months    Recent Labs   Lab Test  06/23/18   1005   POTASSIUM  3.9             "

## 2018-09-07 NOTE — TELEPHONE ENCOUNTER
Medication is being filled for 1 time refill only due to:  Patient needs to be seen because due for diabetes follow and BP recheck.     Prescription approved per [facility].    Tara PATEL RN, BSN, PHN  [place] [facility] RN

## 2018-09-07 NOTE — TELEPHONE ENCOUNTER
Last Written Prescription Date:  05/01/18  Last Fill Quantity: 90,  # refills: 3  Last office visit: 5/9/2018 with prescribing provider:  Dr Lopez   Future Office Visit:   Next 5 appointments (look out 90 days)     Sep 12, 2018  8:00 AM CDT   Nurse Only with ADDISON SCHRADER   [facility] ([facility]    [address] [place] [address]   [phone]            Oct 11, 2018  7:00 AM CDT   Return Visit with SARAH Raymundo CNP   [facility] ([facility])    [address] [place] [address]   [phone]                 Requested Prescriptions   Pending Prescriptions Disp Refills     lisinopril (PRINIVIL/ZESTRIL) 20 MG tablet 90 tablet 3     Sig: Take 1 tablet (20 mg) by mouth daily    There is no refill protocol information for this order        Dorys Mendez/SHAZIA

## 2018-09-09 DIAGNOSIS — E11.49 DIABETES MELLITUS TYPE 2 WITH NEUROLOGICAL MANIFESTATIONS (H): ICD-10-CM

## 2018-09-10 NOTE — TELEPHONE ENCOUNTER
Requested Prescriptions   Pending Prescriptions Disp Refills     metFORMIN (GLUCOPHAGE-XR) 500 MG 24 hr tablet [Pharmacy Med Name: METFORMIN ER 500MG 24HR TABS] 360 tablet 0    Last Written Prescription Date:  03/02/2018  Last Fill Quantity: 360 tablet,  # refills: 1   Last office visit: 6/7/2018 with prescribing provider:  05/09/2018   Future Office Visit:   Next 5 appointments (look out 90 days)     Sep 12, 2018  8:00 AM CDT   Nurse Only with ADDISON SCHRADER   [facility] ([facility])    [address] [place] [address]   [phone]            Oct 11, 2018  7:00 AM CDT   Return Visit with SARAH Raymundo CNP   [facility] ([facility])    [address] [place] [address]   [phone]                  Sig: TAKE 2 TABLETS BY MOUTH TWICE DAILY WITH MEALS    Biguanide Agents Failed    9/9/2018  5:46 PM       Failed - Blood pressure less than 140/90 in past 6 months    BP Readings from Last 3 Encounters:   07/16/18 141/78   06/23/18 130/80   06/07/18 125/68                Passed - Patient has documented LDL within the past 12 mos.    Recent Labs   Lab Test  12/27/17   1001   LDL  37            Passed - Patient has had a Microalbumin in the past 15 mos.    Recent Labs   Lab Test  12/27/17   1002  12/17/14   MICROALB   --    --   10   MICROL  23   < >   --    UMALCR  24.55*   < >   --     < > = values in this interval not displayed.            Passed - Patient is age 10 or older       Passed - Patient has documented A1c within the specified period of time.    If HgbA1C is 8 or greater, it needs to be on file within the past 3 months.  If less than 8, must be on file within the past 6 months.     Recent Labs   Lab Test  03/20/18   0938   A1C  7.5*            Passed - Patient's CR is NOT>1.4 OR Patient's EGFR is NOT<45 within past 12 mos.    Recent Labs   Lab Test  06/23/18   1005   GFRESTIMATED  81  "  GFRESTBLACK  >90       Recent Labs   Lab Test  06/23/18   1005   CR  0.96            Passed - Patient does NOT have a diagnosis of CHF.       Passed - Recent (6 mo) or future (30 days) visit within the authorizing provider's specialty    Patient had office visit in the last 6 months or has a visit in the next 30 days with authorizing provider or within the authorizing provider's specialty.  See \"Patient Info\" tab in inbasket, or \"Choose Columns\" in Meds & Orders section of the refill encounter.              Cr Coe XRT  "

## 2018-09-11 RX ORDER — METFORMIN HCL 500 MG
TABLET, EXTENDED RELEASE 24 HR ORAL
Qty: 360 TABLET | Refills: 0 | Status: SHIPPED | OUTPATIENT
Start: 2018-09-11 | End: 2018-12-06

## 2018-09-12 ENCOUNTER — ALLIED HEALTH/NURSE VISIT (OUTPATIENT)
Dept: NURSING | Facility: CLINIC | Age: 58
End: 2018-09-12

## 2018-09-12 VITALS — BODY MASS INDEX: 34.33 KG/M2 | WEIGHT: 243.4 LBS

## 2018-09-12 DIAGNOSIS — E11.42 TYPE 2 DIABETES MELLITUS WITH PERIPHERAL NEUROPATHY (H): ICD-10-CM

## 2018-09-12 DIAGNOSIS — E66.01 MORBID OBESITY, UNSPECIFIED OBESITY TYPE (H): Primary | ICD-10-CM

## 2018-09-12 PROCEDURE — 99207 ZZC HEALTH COACHING, NO CHARGE: CPT

## 2018-09-12 NOTE — PATIENT INSTRUCTIONS
September 12, 2018    [address] [address]  [phone]  Health Coaching Progress Note    Patient Name: Crispin Rojas Date: September 12, 2018       Plan/Goal for the Next 4 Weeks:   GOAL #1: Be more aware of what you're eating at meals and snacks/work on reducing carbs, eat more fresh food items and add in more protein each day  GOAL #2: Continue to fit in more physical activity each day as able/keep moving  GOAL #3: Keep working on sleep schedule/try to get close to 8 hours each night as able  GOAL #4: Attend upcoming medication check up/A1C lab appointment with Jasmin Arteaga-New Goal  GOAL #5:  sleep study materials/complete study as directed-New Goal      Plan: (Homework, other):  Patient was encouraged to continue to seek condition-related information and education, as well as schedule a follow up appointment with the Health  in 4 weeks. Patient has set self-identified goals and will monitor progress until the next appointment.  Scheduled our next coaching appointment for Wednesday October 10th at 8am.  Juan M Oshea       Resources:

## 2018-09-12 NOTE — MR AVS SNAPSHOT
After Visit Summary   9/12/2018    Crispin Rojas    MRN: [id number]           Patient Information     Date Of Birth          1960        Visit Information        Provider Department      9/12/2018 8:00 AM JUAN M SCHRADER [facility]        Care Instructions      September 12, 2018    [facility]  [address] [place] [address]  [phone]  Health Coaching Progress Note    Patient Name: Crispin Rojas Date: September 12, 2018       Plan/Goal for the Next 4 Weeks:   GOAL #1: Be more aware of what you're eating at meals and snacks/work on reducing carbs, eat more fresh food items and add in more protein each day  GOAL #2: Continue to fit in more physical activity each day as able/keep moving  GOAL #3: Keep working on sleep schedule/try to get close to 8 hours each night as able  GOAL #4: Attend upcoming medication check up/A1C lab appointment with Jasmin Arteaga-New Goal  GOAL #5:  sleep study materials/complete study as directed-New Goal      Plan: (Homework, other):  Patient was encouraged to continue to seek condition-related information and education, as well as schedule a follow up appointment with the Health  in 4 weeks. Patient has set self-identified goals and will monitor progress until the next appointment.  Scheduled our next coaching appointment for Wednesday October 10th at 8am.  Juan M Schrader       Resources:                Follow-ups after your visit        Your next 10 appointments already scheduled     Sep 21, 2018 12:30 PM CDT   Return Sleep Patient with David Evans MD   [facility] ([facility])    [address] [address]   [phone]            Oct 10, 2018  8:00 AM CDT   Nurse Only with JUAN M SCHRADER   [facility]    [address] [place] [address]  [facility] [address] [phone]            Oct 11, 2018  7:00 AM CDT   [facility] Visit with SARAH Raymundo CNP   [facility]    [address]  [facility] [address] [phone]              Who to contact     If you have questions or need follow up information about today's clinic visit or your schedule please contact [facility] directly at [phone].  Normal or non-critical lab and imaging results will be communicated to you by [employer], letter or phone within 4 business days after the clinic has received the results. If you do not hear from us within 7 days, please contact the clinic through [employer] or phone. If you have a critical or abnormal lab result, we will notify you by phone as soon as possible.  Submit refill requests through [employer] or call your pharmacy and they will forward the refill request to us. Please allow 3 business days for your refill to be completed.          Additional Information About Your Visit        [employer] Information     [employer] gives you secure access to your electronic health record. If you see a primary care provider, you can also send messages to your care team and make appointments. If you have questions, please call your primary care clinic.  If you do not have a primary care provider, please call [phone] and they will assist you.        Care EveryWhere ID     This is your Care EveryWhere ID. This could be used by other organizations to access your [facility] medical records  [id number]        Your Vitals Were     BMI (Body Mass Index)                   34.33 kg/m2            Blood Pressure from Last 3 Encounters:   07/16/18 141/78   06/23/18 130/80   06/07/18 125/68    Weight from Last 3 Encounters:   09/12/18 243 lb 6.4 oz (110.4 kg)   07/23/18 245 lb (111.1 kg)   07/16/18 247 lb (112 kg)              Today, you had the following     No orders found for display        "Primary Care Provider Office Phone # Fax #    Aden Lopez [phone] [address]  [facility] [address]        Equal Access to Services     ELI WHITAKERJUVENTINO : Hadii aad ku norriso Soivonneali, waaxda luqadaha, qaybta kaalmada adeegleandroda, blanca collinsotilio meagan. So [facility] [place] [phone].    ATENCIÓN: Si habla español, tiene a gomez disposición servicios gratuitos de asistencia lingüística. Llame al [phone].    We comply with applicable federal civil rights laws and [place] laws. We do not discriminate on the basis of race, color, national origin, age, disability, sex, sexual orientation, or gender identity.            Thank you!     Thank you for choosing [facility]  for your care. Our goal is always to provide you with excellent care. Hearing back from our patients is one way we can continue to improve our services. Please take a few minutes to complete the written survey that you may receive in the mail after your visit with us. Thank you!             Your Updated Medication List - Protect others around you: Learn how to safely use, store and throw away your medicines at www.disposemymeds.org.          This list is accurate as of 9/12/18  8:27 AM.  Always use your most recent med list.                   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 Use one syringe  daily or as directed.    Type 2 diabetes, HbA1C goal < 8% (H)       blood glucose lancets standard    no brand specified    300 each    " Use to test blood sugar 3 times daily or as directed.    Type 2 diabetes mellitus with diabetic neuropathy, with long-term current use of insulin (H)       blood glucose monitoring meter device kit    no brand specified    1 kit    Use to test blood sugar 3 times daily or as directed.    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 Replace sensor every 10 days.    Type 2 diabetes mellitus with diabetic peripheral angiopathy and gangrene, with long-term current use of insulin (H)       exenatide ER 2 MG SQ pen for weelky inj    BYDUREON    4 kit    Inject 2 mg Subcutaneous every 7 days    Type 2 diabetes mellitus with peripheral neuropathy (H)       FREESTYLE CORWIN READER Iman     1 Device    1 Device as needed    Type 2 diabetes mellitus with diabetic peripheral angiopathy and gangrene, with long-term current use of insulin (H)       gabapentin 100 MG capsule    NEURONTIN     Take 300 mg by mouth At Bedtime        insulin degludec 100 UNIT/ML pen    TRESIBA    30 mL    Inject 55 Units Subcutaneous daily    Type 2 diabetes mellitus with diabetic peripheral angiopathy and gangrene, with long-term current use of insulin (H)       * insulin  lispro 100 UNIT/ML injection    HumaLOG KWIKpen    30 mL    Inject 35 units sq tid.  Dispense 90-day supply or as allowed by insurance.    Type 2 diabetes mellitus with peripheral neuropathy (H)       * insulin lispro 100 UNIT/ML injection    ADMELOG SOLOSTAR    30 mL    Inject 35 units sq tid.  Dispense 90-day supply or as allowed by insurance.    Type 2 diabetes mellitus with peripheral neuropathy (H)       * HumaLOG KWIKpen 100 UNIT/ML injection   Generic drug:  insulin lispro     30 mL    INJECT 35 UNITS SUB-Q THREE TIMES DAILY    Type 2 diabetes mellitus with peripheral neuropathy (H)       ketoconazole 2 % shampoo    NIZORAL    120 mL    APPLY TO THE AFFECTED AREA AND WASH OFF AFTER 5 MINUTES.    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)       MULTIVITAMIN PO      Take 1 tablet by mouth daily        OMEGA-3 FISH OIL PO      Take 2 g by mouth daily        pantoprazole 20 MG EC tablet    PROTONIX    30 tablet    Take by mouth 30-60 minutes before a meal.    Gastroesophageal reflux disease without esophagitis       Pen Needles 31G X 5 MM Misc     200 each    1 Device 5 times daily , TWICE DAILY WITH LANTUS AND 3 TIMES A DAY PRN WITH NOVOLOG FLEXPEN    Type 2 diabetes mellitus with peripheral neuropathy (H)       Semaglutide 1 MG/DOSE Sopn    OZEMPIC    1 pen    Inject 1 mg Subcutaneous once a week    Type 2 diabetes mellitus with peripheral neuropathy (H)       thin lancets    NO BRAND SPECIFIED    300 each    1 each 3 times daily.    Type 2 diabetes, HbA1C goal < 8% (H)       VITAMIN D3 PO      Take 1,000 Units by mouth daily        * Notice:  This list has 5 medication(s) that are the same as other medications prescribed for  you. Read the directions carefully, and ask your doctor or other care provider to review them with you.

## 2018-09-12 NOTE — PROGRESS NOTES
2018    [address] [id number] [phone]  Health Coaching Progress Note    Patient Name: Crispin Rojas Date: 2018      Session Length: 30      DATA    PRM Master Survey Scores Reviewed: Yes    Core Healthy Days Survey:         ROCHELLE Score (Last Two) 2018   ROCHELLE Raw Score 30   Activation Score 56   ROCHELLE Level 3       PHQ-2 Score 2018 3/20/2018   PHQ-2 Total Score Interpretation - Positive if 3 or more points; Administer PHQ-9 if positive 0 0   MyC PHQ-2 Score - 0       PHQ-9 SCORE 3/20/2018 2018   Total Score MyChart Incomplete -   Total Score - 0       Treatment Objective(s) Addressed in This Session:  Target Behavior(s): diet/weight loss and disease management/lifestyle changes of working on getting back to old healthy habits-being more aware of dietary choices by reducing carb intake, adding in more fresh foods and protein, trying to find ways to be more active with busy schedule-walking more during work or between jobs, getting more sleep each night, managing diabetes well, losing weight and maintaining, picking up equipment for at home sleep study as directed,  accessing resources and scheduling/attending appointments as needed-upcoming checkup with Jasmin Arteaga/A1C lab.      Current Stressors / Issues:  Mom passed away, unexpected travel to [place]/helping dad with  arrangements, finding time to be active, being more disciplined with food choices, not getting enough sleep, unexplained spikes in blood sugar, ongoing shoulder pain-arthritis/limits mobility, loves competitive sports (golf)/activities but hasn't been able to participate due to time constraints/injuries, work several jobs to pay the bills, wife is a good cook-eats too well sometimes, started gaining weight with insulin use, wants to lose weight and maintain it, managing diabetes well/avoiding  other medications  What Patient Does Well:   1) Patient has had success with lifestyle changes in the past and feels he knows what he needs to do, but just struggles to find time to fit everything into his routine  Previous Successes:   1) Patient was down 2lbs from last visit by watching his dietary intake and being more active each day for a total of 7lbs lost.  Areas in Need of Improvement:   1) Maintaining a regular exercise routine  2) Making healthier dietary choices-more fresh foods, protein-cutting back on carbs  3) Losing weight and keeping it off  4) Managing shoulder pain/regaining strength  Barriers to Change:   1) Shoulder injury  2) Works several different jobs/time is limited  3) Bad cycle of losing a lot of weight and then gaining it all back  4) Mom passed away-unexpected travel to [place]  Reasons for Change:   1) Lose weight  2) Continue to manage diabetes  3) Figure out something with his shoulder pain so he can play golf again  Plan/Goal for the Next 4 Weeks:   GOAL #1: Be more aware of what you're eating at meals and snacks/work on reducing carbs, eat more fresh food items and add in more protein each day  GOAL #1 Progress Toward Goal: 75% (Has been paying more attention to what he eats-continues to make healthier choices)  GOAL #2: Continue to fit in more physical activity each day as able/keep moving  GOAL #2 Progress Toward Goal: 25% (Hasn't been a lot more active, but is trying to find time to fit it in-plans to work harder on his off days)  GOAL #3: Keep working on sleep schedule/try to get close to 8 hours each night as able  GOAL #3 Progress Toward Goal: 25% (Still working on getting to bed earlier-some days are better than others)  GOAL #4: Attend upcoming medication check up/A1C lab appointment with Jasmin Arteaga  GOAL #4 Progress Toward Goal: 0% New Goal  GOAL #5:  sleep study materials/complete study as directed  GOAL #5 Progress Toward Goal: 0% New  Goal    Intervention:  Motivational Interviewing    MI Intervention: Expressed Empathy/Understanding, Supported Autonomy, Collaboration, Evocation, Permission to raise concern or advise, Open-ended questions, Reflections: simple and complex and Change talk (evoked)     Change Talk Expressed by the Patient: Desire to change Ability to change Reasons to change Need to change Committment to change Activation Taking steps    Provider Response to Change Talk: E - Evoked more info from patient about behavior change, A - Affirmed patient's thoughts, decisions, or attempts at behavior change, R - Reflected patient's change talk and S - Summarized patient's change talk statements    Assessment / Progress on Treatment Objective(s) / Homework:    Minimal progress - ACTION (Actively working towards change); Intervened by reinforcing change plan / affirming steps taken  New Objective established this session - PREPARATION (Decided to change - considering how); Intervened by negotiating a change plan and determining options / strategies for behavior change, identifying triggers, exploring social supports, and working towards setting a date to begin behavior change         Plan: (Homework, other):  Patient was encouraged to continue to seek condition-related information and education, as well as schedule a follow up appointment with the Health  in 4 weeks. Patient has set self-identified goals and will monitor progress until the next appointment.  Scheduled our next coaching appointment for Wednesday October 10th at 8am.  Juan M Oshea

## 2018-10-09 ENCOUNTER — OFFICE VISIT (OUTPATIENT)
Dept: SLEEP MEDICINE | Facility: CLINIC | Age: 58
End: 2018-10-09
Payer: COMMERCIAL

## 2018-10-09 DIAGNOSIS — G47.33 OSA (OBSTRUCTIVE SLEEP APNEA): ICD-10-CM

## 2018-10-09 NOTE — MR AVS SNAPSHOT
After Visit Summary   10/9/2018    Crispin Rojas    MRN: [id number]           Patient Information     Date Of Birth          1960        Visit Information        Provider Department      10/9/2018 3:00 PM BED 7 SH SLEEP [facility]        Today's Diagnoses     GILA (obstructive sleep apnea)           Follow-ups after your visit        Your next 10 appointments already scheduled     Oct 10, 2018  8:00 AM CDT   Nurse Only with ADDISON SCHRADER   [facility]    [address] [facility] [address]   [phone]            Oct 10, 2018  9:30 AM CDT   HST Drop Off with BED 7 SH SLEEP   [facility] ([facility])    [address] [address]   [phone]            Oct 11, 2018  7:00 AM CDT   Return Visit with SARAH Raymundo CNP   [facility]    [address] [place] [address]. [facility] [address]   [phone]            Oct 15, 2018  9:30 AM CDT   Return Sleep Patient with David Evans MD   [facility] ([facility])    [address] [address]   [phone]              Who to contact     If you have questions or need follow up information about today's clinic visit or your schedule please contact [facility] directly at [phone].  Normal or non-critical lab and imaging results will be communicated to you by MyChart, letter or phone within 4 business days after the clinic has received the results. If you do not hear from us within 7 days, please contact the clinic through Noonswoonhart or phone. If you have a critical or abnormal lab result, we will notify you by phone as soon as possible.  Submit refill requests through [employer] or call your pharmacy and they will forward the refill request to us. Please allow 3  business days for your refill to be completed.          Additional Information About Your Visit        MyChart Information     [employer] gives you secure access to your electronic health record. If you see a primary care provider, you can also send messages to your care team and make appointments. If you have questions, please call your primary care clinic.  If you do not have a primary care provider, please call [phone] and they will assist you.        Care EveryWhere ID     This is your Care EveryWhere ID. This could be used by other organizations to access your [place] medical records  [id number]         Blood Pressure from Last 3 Encounters:   07/16/18 141/78   06/23/18 130/80   06/07/18 125/68    Weight from Last 3 Encounters:   09/12/18 110.4 kg (243 lb 6.4 oz)   07/23/18 111.1 kg (245 lb)   07/16/18 112 kg (247 lb)              We Performed the Following     HST-Home Sleep Apnea Test        Primary Care Provider Office Phone # Fax #    Aden Ethan Lopez [phone] [address] [facility] [address]        Equal Access to Services     YE MARTÍNEZ : Hadii aad ku hadasho Socas, waaxda luqadaha, qaybta kaalmada adejefryyaandrew, blanca moran . So [facility] [phone].    ATENCIÓN: Si habla español, tiene a gomez disposición servicios gratuitos de asistencia lingüística. [facility] [phone].    We comply with applicable federal civil rights laws and [place] laws. We do not discriminate on the basis of race, color, national origin, age, disability, sex, sexual orientation, or gender identity.            Thank you!     Thank you for choosing [facility]  for your care. Our goal is always to provide you with excellent care. Hearing back from our patients is one way we can continue to improve our services. Please take a few minutes to complete the written survey that you may receive in the mail after your visit with us. Thank you!            "  Your Updated Medication List - Protect others around you: Learn how to safely use, store and throw away your medicines at www.disposemymeds.org.          This list is accurate as of 10/9/18 11:59 PM.  Always use your most recent med list.                   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 Use one syringe  daily or as directed.    Type 2 diabetes, HbA1C goal < 8% (H)       blood glucose lancets standard    no brand specified    300 each    Use to test blood sugar 3 times daily or as directed.    Type 2 diabetes mellitus with diabetic neuropathy, with long-term current use of insulin (H)       blood glucose monitoring meter device kit    no brand specified    1 kit    Use to test blood sugar 3 times daily or as directed.    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 Replace sensor every 10 days.    Type 2 diabetes mellitus with diabetic peripheral angiopathy and gangrene, with long-term current use of insulin (H)       exenatide ER 2 MG SQ pen for weelky inj    BYDUREON    4 kit    Inject 2 mg Subcutaneous every 7 days    Type 2 diabetes mellitus with peripheral neuropathy (H)       FREESTYLE CORWIN READER Iman     1 Device    1 Device as needed    Type 2 diabetes mellitus with diabetic peripheral angiopathy and gangrene, with long-term current use of insulin (H)       gabapentin 100 MG capsule    NEURONTIN     Take 300 mg by mouth At Bedtime        insulin degludec 100 UNIT/ML pen    TRESIBA    30 mL    Inject 55 Units Subcutaneous daily    Type 2 diabetes mellitus with diabetic peripheral angiopathy and gangrene, with long-term current use of insulin (H)       * insulin lispro 100 UNIT/ML injection    HumaLOG KWIKpen    30 mL    Inject 35 units sq tid.  Dispense 90-day supply or as allowed by insurance.    Type 2 diabetes mellitus with peripheral neuropathy (H)       * insulin lispro 100 UNIT/ML injection    ADMELOG SOLOSTAR    30 mL    Inject 35 units sq tid.  Dispense 90-day supply or as allowed by insurance.    Type 2 diabetes mellitus with peripheral neuropathy (H)       * HumaLOG KWIKpen 100 UNIT/ML injection   Generic drug:  insulin lispro     30 mL    INJECT 35 UNITS SUB-Q THREE TIMES DAILY    Type 2 diabetes mellitus with peripheral neuropathy (H)       ketoconazole 2 % shampoo    NIZORAL    120 mL    APPLY TO THE AFFECTED AREA AND WASH OFF AFTER 5 MINUTES.    Seborrheic dermatitis       lisinopril 20 MG tablet    PRINIVIL/ZESTRIL    90 tablet    Take 1 tablet (20 mg) by mouth daily    Essential hypertension       metFORMIN 500 MG 24 hr tablet    GLUCOPHAGE-XR    360 tablet    TAKE 2 TABLETS BY MOUTH TWICE DAILY WITH MEALS    Diabetes mellitus type 2 with neurological  manifestations (H)       MULTIVITAMIN PO      Take 1 tablet by mouth daily        OMEGA-3 FISH OIL PO      Take 2 g by mouth daily        pantoprazole 20 MG EC tablet    PROTONIX    30 tablet    Take by mouth 30-60 minutes before a meal.    Gastroesophageal reflux disease without esophagitis       Pen Needles 31G X 5 MM Misc     200 each    1 Device 5 times daily , TWICE DAILY WITH LANTUS AND 3 TIMES A DAY PRN WITH NOVOLOG FLEXPEN    Type 2 diabetes mellitus with peripheral neuropathy (H)       Semaglutide 1 MG/DOSE Sopn    OZEMPIC    1 pen    Inject 1 mg Subcutaneous once a week    Type 2 diabetes mellitus with peripheral neuropathy (H)       thin lancets    NO BRAND SPECIFIED    300 each    1 each 3 times daily.    Type 2 diabetes, HbA1C goal < 8% (H)       VITAMIN D3 PO      Take 1,000 Units by mouth daily        * Notice:  This list has 5 medication(s) that are the same as other medications prescribed for you. Read the directions carefully, and ask your doctor or other care provider to review them with you.

## 2018-10-10 ENCOUNTER — ALLIED HEALTH/NURSE VISIT (OUTPATIENT)
Dept: NURSING | Facility: CLINIC | Age: 58
End: 2018-10-10

## 2018-10-10 ENCOUNTER — DOCUMENTATION ONLY (OUTPATIENT)
Dept: SLEEP MEDICINE | Facility: CLINIC | Age: 58
End: 2018-10-10
Payer: COMMERCIAL

## 2018-10-10 VITALS — BODY MASS INDEX: 34.01 KG/M2 | WEIGHT: 241.1 LBS

## 2018-10-10 DIAGNOSIS — E11.42 TYPE 2 DIABETES MELLITUS WITH PERIPHERAL NEUROPATHY (H): ICD-10-CM

## 2018-10-10 DIAGNOSIS — E66.01 MORBID OBESITY, UNSPECIFIED OBESITY TYPE (H): Primary | ICD-10-CM

## 2018-10-10 PROCEDURE — 99207 ZZC HEALTH COACHING, NO CHARGE: CPT

## 2018-10-10 NOTE — PROGRESS NOTES
Pt is completing a home sleep test. Pt was instructed on how to put on the Noxturnal T3 device and associated equipment before going to bed and given the opportunity to practice putting it on before leaving the sleep center. Pt was reminded to bring the home sleep test kit back to the center tomorrow, at agreed upon time for download and reporting.

## 2018-10-10 NOTE — NURSING NOTE
HST failed. Patient notified and will repeat test. Charges have been removed.    Sylvie [place]  [facility] Clinic - Specialist

## 2018-10-10 NOTE — PROGRESS NOTES
HST failed. Patient notified and will repeat test. Charges have been removed.    Sylvie [place]  Sleep Clinic - Specialist

## 2018-10-10 NOTE — PATIENT INSTRUCTIONS
October 10, 2018    [address] [address]  [phone]  Health Coaching Progress Note    Patient Name: Crispin Rojas Date: October 10, 2018       Plan/Goal for the Next 4 Weeks:   GOAL #1: Be more aware of what you're eating at meals and snacks/work on reducing carbs, eat more fresh food items and add in more protein each day  GOAL #2: Continue to fit in more physical activity each day as able/keep moving  GOAL #3: Keep working on sleep schedule/try to get close to 8 hours each night as able  GOAL #4: Attend upcoming medication check up/A1C lab appointment with Jasmin Arteaga  GOAL #5: Attend follow-up with sleep study as scheduled/check out results-New Goal    Plan: (Homework, other):  Patient was encouraged to continue to seek condition-related information and education, as well as schedule a follow up appointment with the Health  in 4 weeks. Patient has set self-identified goals and will monitor progress until the next appointment.  Scheduled our next coaching appointment for Friday November 9th at 8am.  Juan M Oshea       Resources:

## 2018-10-10 NOTE — MR AVS SNAPSHOT
After Visit Summary   10/10/2018    Crispin Rojas    MRN: [id number]           Patient Information     Date Of Birth          1960        Visit Information        Provider Department      10/10/2018 8:00 AM JUAN M SCHRADER [facility]        Care Instructions      October 10, 2018    [facility]  [address] [facility] [address]  [phone]  Health Coaching Progress Note    Patient Name: Crispin Rojas Date: October 10, 2018       Plan/Goal for the Next 4 Weeks:   GOAL #1: Be more aware of what you're eating at meals and snacks/work on reducing carbs, eat more fresh food items and add in more protein each day  GOAL #2: Continue to fit in more physical activity each day as able/keep moving  GOAL #3: Keep working on sleep schedule/try to get close to 8 hours each night as able  GOAL #4: Attend upcoming medication check up/A1C lab appointment with Jasmin Arteaga  GOAL #5: Attend follow-up with sleep study as scheduled/check out results-New Goal    Plan: (Homework, other):  Patient was encouraged to continue to seek condition-related information and education, as well as schedule a follow up appointment with the Health  in 4 weeks. Patient has set self-identified goals and will monitor progress until the next appointment.  Scheduled our next coaching appointment for Friday November 9th at 8am.  Juan M Schrader       Resources:                Follow-ups after your visit        Your next 10 appointments already scheduled     Oct 10, 2018  9:30 AM CDT   HST Drop Off with BED 7 SH SLEEP   [facility] ([facility])    [address] [address]   [phone]            Oct 11, 2018  7:00 AM CDT   Return Visit with SARAH Raymundo CNP   [facility] ([facility])    [address]  [facility] [address] [phone]            Oct 15, 2018  9:30 AM CDT   Return Sleep Patient with David Evans MD   [facility] ([facility])    [address] [facility] [address]  [facility] [address] [phone]            Nov 09, 2018  8:00 AM CST   Nurse Only with ADDISON SCHRADER   [facility]    [address] [facility] [address] [phone]              Who to contact     If you have questions or need follow up information about today's clinic visit or your schedule please contact [facility] directly at [phone].  Normal or non-critical lab and imaging results will be communicated to you by MyChart, letter or phone within 4 business days after the clinic has received the results. If you do not hear from us within 7 days, please contact the clinic through [email or web address] or phone. If you have a critical or abnormal lab result, we will notify you by phone as soon as possible.  Submit refill requests through [employer] or call your pharmacy and they will forward the refill request to us. Please allow 3 business days for your refill to be completed.          Additional Information About Your Visit        Store[employer]est Information     [employer] gives you secure access to your electronic health record. If you see a primary care provider, you can also send messages to your care team and make appointments. If you have questions, please call your primary care clinic.  If you do not have a primary care provider, please call [phone] and they will assist you.        Care EveryWhere ID     This is your Care EveryWhere ID. This could be used by other organizations to access your [place] medical records  [id number]        Your Vitals Were     BMI (Body Mass Index)                   34.01 kg/m2            Blood Pressure from Last 3 Encounters:   07/16/18 141/78   06/23/18 130/80   06/07/18 125/68     "Weight from Last 3 Encounters:   10/10/18 241 lb 1.6 oz (109.4 kg)   09/12/18 243 lb 6.4 oz (110.4 kg)   07/23/18 245 lb (111.1 kg)              Today, you had the following     No orders found for display       Primary Care Provider Office Phone # Fax #    Aden Lopez [phone] [address] [facility] [address]        Equal Access to Services     ELI MARTÍNEZ : Hadii aad ku hadasho Soomaali, waaxda luqadaha, qaybta kaalmada adeegyada, waxay idiin hayaan adeeg kharash la'arya . So [facility] [phone].    ATENCIÓN: Si habla español, tiene a gomez disposición servicios gratuitos de asistencia lingüística. [facility] [phone].    We comply with applicable federal civil rights laws and [place] laws. We do not discriminate on the basis of race, color, national origin, age, disability, sex, sexual orientation, or gender identity.            Thank you!     Thank you for choosing [facility]  for your care. Our goal is always to provide you with excellent care. Hearing back from our patients is one way we can continue to improve our services. Please take a few minutes to complete the written survey that you may receive in the mail after your visit with us. Thank you!             Your Updated Medication List - Protect others around you: Learn how to safely use, store and throw away your medicines at www.disposemymeds.org.          This list is accurate as of 10/10/18  8:43 AM.  Always use your most recent med list.                   Brand Name Dispense Instructions for use Diagnosis    amLODIPine 5 MG tablet    NORVASC    90 tablet    TAKE 1 TABLET BY MOUTH EVERY DAY    Other specified transient cerebral ischemias       atorvastatin 40 MG tablet    LIPITOR    90 tablet    TAKE 1 TABLET BY MOUTH EVERY DAY    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), Hyperlipidemia LDL goal <100       BD insulin syringe ultrafine 30G X 1/2\" 0.3 ML   Generic drug:  " insulin syringe-needle U-100     100 each    1 Syringe daily. Use one syringe  daily or as directed.    Type 2 diabetes, HbA1C goal < 8% (H)       blood glucose lancets standard    no brand specified    300 each    Use to test blood sugar 3 times daily or as directed.    Type 2 diabetes mellitus with diabetic neuropathy, with long-term current use of insulin (H)       blood glucose monitoring meter device kit    no brand specified    1 kit    Use to test blood sugar 3 times daily or as directed.    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 Replace sensor every 10 days.    Type 2 diabetes mellitus with diabetic peripheral angiopathy and gangrene, with long-term current use of insulin (H)       exenatide ER 2 MG SQ pen for weelky inj    BYDUREON    4 kit    Inject 2 mg Subcutaneous every 7 days    Type 2 diabetes mellitus with peripheral neuropathy (H)       FREESTYLE CORWIN READER Iman     1 Device    1 Device as needed    Type 2 diabetes mellitus with diabetic peripheral angiopathy and gangrene, with long-term current use of insulin (H)       gabapentin 100 MG capsule    NEURONTIN     Take 300 mg by mouth At Bedtime        insulin degludec  100 UNIT/ML pen    TRESIBA    30 mL    Inject 55 Units Subcutaneous daily    Type 2 diabetes mellitus with diabetic peripheral angiopathy and gangrene, with long-term current use of insulin (H)       * insulin lispro 100 UNIT/ML injection    HumaLOG KWIKpen    30 mL    Inject 35 units sq tid.  Dispense 90-day supply or as allowed by insurance.    Type 2 diabetes mellitus with peripheral neuropathy (H)       * insulin lispro 100 UNIT/ML injection    ADMELOG SOLOSTAR    30 mL    Inject 35 units sq tid.  Dispense 90-day supply or as allowed by insurance.    Type 2 diabetes mellitus with peripheral neuropathy (H)       * HumaLOG KWIKpen 100 UNIT/ML injection   Generic drug:  insulin lispro     30 mL    INJECT 35 UNITS SUB-Q THREE TIMES DAILY    Type 2 diabetes mellitus with peripheral neuropathy (H)       ketoconazole 2 % shampoo    NIZORAL    120 mL    APPLY TO THE AFFECTED AREA AND WASH OFF AFTER 5 MINUTES.    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)       MULTIVITAMIN PO      Take 1 tablet by mouth daily        OMEGA-3 FISH OIL PO      Take 2 g by mouth daily        pantoprazole 20 MG EC tablet    PROTONIX    30 tablet    Take by mouth 30-60 minutes before a meal.    Gastroesophageal reflux disease without esophagitis       Pen Needles 31G X 5 MM Misc     200 each    1 Device 5 times daily , TWICE DAILY WITH LANTUS AND 3 TIMES A DAY PRN WITH NOVOLOG FLEXPEN    Type 2 diabetes mellitus with peripheral neuropathy (H)       Semaglutide 1 MG/DOSE Sopn    OZEMPIC    1 pen    Inject 1 mg Subcutaneous once a week    Type 2 diabetes mellitus with peripheral neuropathy (H)       thin lancets    NO BRAND SPECIFIED    300 each    1 each 3  times daily.    Type 2 diabetes, HbA1C goal < 8% (H)       VITAMIN D3 PO      Take 1,000 Units by mouth daily        * Notice:  This list has 5 medication(s) that are the same as other medications prescribed for you. Read the directions carefully, and ask your doctor or other care provider to review them with you.

## 2018-10-10 NOTE — PROGRESS NOTES
October 10, 2018    [address] [id number] [phone]  Health Coaching Progress Note    Patient Name: Crispin Rojas Date: October 10, 2018      Session Length: 30      DATA    PRM Master Survey Scores Reviewed: Yes    Core Healthy Days Survey:         ROCHELLE Score (Last Two) 6/21/2018   ROCHELLE Raw Score 30   Activation Score 56   ROCHELLE Level 3       PHQ-2 Score 6/21/2018 3/20/2018   PHQ-2 Total Score Interpretation - Positive if 3 or more points; Administer PHQ-9 if positive 0 0   MyC PHQ-2 Score - 0       PHQ-9 SCORE 3/20/2018 5/9/2018   Total Score MyChart Incomplete -   Total Score - 0       Treatment Objective(s) Addressed in This Session:  Target Behavior(s): diet/weight loss and disease management/lifestyle changes of working on getting back to old healthy habits-being more aware of dietary choices by reducing carb intake, adding in more fresh foods and protein, trying to find ways to be more active with busy schedule-walking more during work or between jobs, possibly returning to gym for workouts during winter months, getting more sleep each night/tries to get in 8 hours, managing diabetes well, losing weight and maintaining, returning equipment for at home sleep study as directed,  accessing resources and scheduling/attending appointments as needed-upcoming checkup with Jasmin Arteaga/A1C lab and follow-up on sleep study.      Current Stressors / Issues:  Work schedule gets very busy right before Reba/holidays-works 10 hour days, just completed sleep study-interested in results, hoping A1C drops at check-up tomorrow, finding time to be active, being more disciplined with food choices, not getting enough sleep, unexplained spikes in blood sugar, ongoing shoulder pain-arthritis/limits mobility, loves competitive sports (golf)/activities but hasn't been able to participate due to time constraints/injuries,  work several jobs to pay the bills, wife is a good cook-eats too well sometimes, started gaining weight with insulin use, wants to lose weight and maintain it, managing diabetes well/avoiding other medications  What Patient Does Well:   1) Patient has had success with lifestyle changes in the past and feels he knows what he needs to do, but just struggles to find time to fit everything into his routine  Previous Successes:   1) Patient was down 2lbs from last visit by watching his dietary intake and being more active each day for a total of 9lbs lost.  Areas in Need of Improvement:   1) Maintaining a regular exercise routine  2) Making healthier dietary choices-more fresh foods, protein-cutting back on carbs  3) Losing weight and keeping it off  4) Managing shoulder pain/regaining strength  Barriers to Change:   1) Shoulder injury  2) Works several different jobs/time is limited  3) Bad cycle of losing a lot of weight and then gaining it all back  4) Mom passed away-unexpected travel to [place]  Reasons for Change:   1) Lose weight  2) Continue to manage diabetes  3) Figure out something with his shoulder pain so he can play golf again  Plan/Goal for the Next 4 Weeks:   GOAL #1: Be more aware of what you're eating at meals and snacks/work on reducing carbs, eat more fresh food items and add in more protein each day  GOAL #1 Progress Toward Goal: 75% (Still being more aware but has been tough as he has family in town and work provides a lot of unhealthy food when work is super busy-trying to eat small amounts)  GOAL #2: Continue to fit in more physical activity each day as able/keep moving  GOAL #2 Progress Toward Goal: 50% (Still fitting it in the best he can-challenging as work is super busy right now-plants to return to gym in January when work slows down)  GOAL #3: Keep working on sleep schedule/try to get close to 8 hours each night as able  GOAL #3 Progress Toward Goal: 75% (Seems like the sleep is  improving-only a few nights a week where he might not get 8 hours due to work schedule)  GOAL #4: Attend upcoming medication check up/A1C lab appointment with Jasmin Arteaga  GOAL #4 Progress Toward Goal: 0% (Will be going to this tomorrow)  GOAL #5:  sleep study materials/complete study as directed  GOAL #5 Progress Toward Goal: 100% Completed (Did the sleep study last night-needs to drop off results and then attend follow-up appointment)  GOAL #6: Attend follow-up with sleep study as scheduled/check out results  GOAL #6 Progress Toward Goal: 0% New Goal    Intervention:  Motivational Interviewing    MI Intervention: Expressed Empathy/Understanding, Supported Autonomy, Collaboration, Evocation, Permission to raise concern or advise, Open-ended questions, Reflections: simple and complex and Change talk (evoked)     Change Talk Expressed by the Patient: Desire to change Ability to change Reasons to change Need to change Committment to change Activation Taking steps    Provider Response to Change Talk: E - Evoked more info from patient about behavior change, A - Affirmed patient's thoughts, decisions, or attempts at behavior change, R - Reflected patient's change talk and S - Summarized patient's change talk statements    Assessment / Progress on Treatment Objective(s) / Homework:    Achieved / completed to satisfaction - MAINTENANCE (Working to maintain change, with risk of relapse); Intervened by continuing to positively reinforce healthy behavior choice   New Objective established this session - PREPARATION (Decided to change - considering how); Intervened by negotiating a change plan and determining options / strategies for behavior change, identifying triggers, exploring social supports, and working towards setting a date to begin behavior change  Satisfactory progress - ACTION (Actively working towards change); Intervened by reinforcing change plan / affirming steps taken         Plan: (Homework,  other):  Patient was encouraged to continue to seek condition-related information and education, as well as schedule a follow up appointment with the Health  in 4 weeks. Patient has set self-identified goals and will monitor progress until the next appointment.  Scheduled our next coaching appointment for Friday November 9th at 8am.  Juan M Oshea

## 2018-10-11 ENCOUNTER — OFFICE VISIT (OUTPATIENT)
Dept: ENDOCRINOLOGY | Facility: CLINIC | Age: 58
End: 2018-10-11
Payer: COMMERCIAL

## 2018-10-11 VITALS
SYSTOLIC BLOOD PRESSURE: 125 MMHG | TEMPERATURE: 97.6 F | DIASTOLIC BLOOD PRESSURE: 63 MMHG | HEART RATE: 77 BPM | BODY MASS INDEX: 33.78 KG/M2 | WEIGHT: 239.5 LBS

## 2018-10-11 DIAGNOSIS — Z23 NEED FOR PROPHYLACTIC VACCINATION AND INOCULATION AGAINST INFLUENZA: ICD-10-CM

## 2018-10-11 DIAGNOSIS — E11.42 TYPE 2 DIABETES MELLITUS WITH PERIPHERAL NEUROPATHY (H): Primary | ICD-10-CM

## 2018-10-11 DIAGNOSIS — E11.52 TYPE 2 DIABETES MELLITUS WITH DIABETIC PERIPHERAL ANGIOPATHY AND GANGRENE, WITH LONG-TERM CURRENT USE OF INSULIN (H): ICD-10-CM

## 2018-10-11 DIAGNOSIS — Z79.4 TYPE 2 DIABETES MELLITUS WITH DIABETIC PERIPHERAL ANGIOPATHY AND GANGRENE, WITH LONG-TERM CURRENT USE OF INSULIN (H): ICD-10-CM

## 2018-10-11 LAB — HBA1C MFR BLD: 7.6 % (ref 0–5.6)

## 2018-10-11 PROCEDURE — 36415 COLL VENOUS BLD VENIPUNCTURE: CPT | Performed by: CLINICAL NURSE SPECIALIST

## 2018-10-11 PROCEDURE — 99214 OFFICE O/P EST MOD 30 MIN: CPT | Mod: 25 | Performed by: CLINICAL NURSE SPECIALIST

## 2018-10-11 PROCEDURE — 90682 RIV4 VACC RECOMBINANT DNA IM: CPT | Performed by: CLINICAL NURSE SPECIALIST

## 2018-10-11 PROCEDURE — 90471 IMMUNIZATION ADMIN: CPT | Performed by: CLINICAL NURSE SPECIALIST

## 2018-10-11 PROCEDURE — 83036 HEMOGLOBIN GLYCOSYLATED A1C: CPT | Performed by: CLINICAL NURSE SPECIALIST

## 2018-10-11 RX ORDER — FLASH GLUCOSE SENSOR
KIT MISCELLANEOUS
Qty: 4 EACH | Refills: 3 | Status: SHIPPED | OUTPATIENT
Start: 2018-10-11 | End: 2019-03-29

## 2018-10-11 NOTE — LETTER
10/11/2018         RE: Crispin Rojas  [address] [facility] [address]        Dear Colleague,    Thank you for referring your patient, Crispin Rojas, to the [facility]. Please see a copy of my visit note below.    Name: Crispin Rojas  Seen today for Diabetes (Last seen 6/7/2018).  HPI:  Crispin Rojas is a 58 year old male who presents for the evaluation/management of:    1. Type 2 DM:  Originally diagnosed at the age of: 1997.  Insulin treated 15+ years.    Current Regimen: Metformin 1000 mg bid, Ozempic 1 mg weekly , Tresiba 55 units q am Humalog 35 units (full meal) or 17 units for a snack tid + 2:50>150.  Takes 1/2 dose of Tresiba if going to be very active.    Started Bydureon 5/2018 - tolerating well.    Changed to Ozempic 6/2018 - feels Ozempic is working better  Using personal CGM - noting BG spikes after meals but eventually decreases back to target range.  Only eats 1-2 meals per day, still takes Humalog tid - lower dose if eating a snack instead of a full meal, correction dose only if not eating.    BS checks: 3-5 times per day, + [place]win  Meter Download: forgot meter  Corwin CGM download:     Working with diabetes ed - last seen by June Walker 4/20/2018.    Taking gabapenin per neurology for ear/nerve problem.    Complications:   Diabetes Complications  Description / Detail    Diabetic Retinopathy   + diabetic retinopathy, next appointment with retinal specialist 6/21, last laser treatment 10/2016  Regular opthalmoologist visit 3/2018   CAD / PAD  No   Neuropathy   Yes, sees Dr. Molina, last seen 2/12/2018 - no open foot lesions at this time.   Nephropathy / Microalbuminuria  Yes, elevated microalbumin   Gastroparesis  No   Hypoglycemia Unawareness  No       2. Hypertension: Blood Pressure today:   BP Readings from Last 3 Encounters:   10/11/18 125/63   07/16/18 141/78   06/23/18 130/80   .  Blood pressure medications include Amlodipine 5 mg qd, lisinopril  20 mg qd.  Takes medications everyday without forgetting a dose.  Denies feeling lightheaded or dizzy.  3. Hyperlipidemia: Takes Atorvastatin 40 mg qd for lipid control.  Denies muscle aches of pains.   4. Prevention:  Flu Shot-   Pneumovax- 11/2015  Opthalmology-annaully  Dental-recommend semiannually  ASA-  Smoking-   PMH/PSH:  Past Medical History:   Diagnosis Date     Cerebral infarction (H)      Chronic infection      H/O heartburn      History of heartburn      Hypertension      Numbness and tingling      Obesity      Renal stone      Type II or unspecified type diabetes mellitus without mention of complication, not stated as uncontrolled      Past Surgical History:   Procedure Laterality Date     AMPUTATE FOOT Left 8/18/2016    Procedure: AMPUTATE FOOT;  Surgeon: Jack Younger DPM;  Location: [facility] OR     AMPUTATE TOE(S) Right 2/1/2016    Procedure: AMPUTATE TOE(S);  Surgeon: Rachelle Manriquez DPM, Pod;  Location: [facility] OR     ANGIOGRAM       COLONOSCOPY       COMBINED CYSTOSCOPY, RETROGRADES, URETEROSCOPY, INSERT STENT Left 8/21/2016    Procedure: COMBINED CYSTOSCOPY, RETROGRADES, URETEROSCOPY, INSERT STENT;  Surgeon: Artemio Valenzuela MD;  Location: [facility] OR     COMBINED CYSTOSCOPY, RETROGRADES, URETEROSCOPY, LASER HOLMIUM LITHOTRIPSY URETER(S), INSERT STENT Left 10/3/2016    Procedure: COMBINED CYSTOSCOPY, RETROGRADES, URETEROSCOPY, LASER HOLMIUM LITHOTRIPSY URETER(S), INSERT STENT;  Surgeon: Artemio Valenzuela MD;  Location: [facility] OR     EXTRACORPOREAL SHOCK WAVE LITHOTRIPSY (ESWL) Left 9/8/2016    Procedure: EXTRACORPOREAL SHOCK WAVE LITHOTRIPSY (ESWL);  Surgeon: Artemio Valenzuela MD;  Location: [facility] OR     RECESSION GASTROCNEMIUS Right 2/1/2016    Procedure: RECESSION GASTROCNEMIUS;  Surgeon: Rachelle Manriquez DPM, Pod;  Location: [facility] OR     Family Hx:  Family History   Problem Relation Age of Onset     Arthritis Mother      SLE     Connective Tissue Disorder Mother      lupus     Diabetes Mother       Cerebrovascular Disease Mother      Cancer Mother      Diabetes Father      Diabetes Maternal Grandfather      Diabetes Sister      Thyroid disease:          DM2: Yes: Strong family history on mothers side - mom, 7 maternal uncles, two maternal aunts, + MGF.         Autoimmune: DM1, SLE, RA, Vitiligo Yes: Lupus    Social Hx:  Social History     Social History     Marital status:      Spouse name: Sydney     Number of children: 2     Years of education: N/A     Occupational History     marketing [employer]     Social History Main Topics     Smoking status: Never Smoker     Smokeless tobacco: Never Used     Alcohol use 0.0 oz/week     0 Standard drinks or equivalent per week      Comment: rare---red wine 2x per week     Drug use: No     Sexual activity: Yes     Partners: Female     Other Topics Concern     Parent/Sibling W/ Cabg, Mi Or Angioplasty Before 65f 55m? No     Social History Narrative          MEDICATIONS:  has a current medication list which includes the following prescription(s): amlodipine, atorvastatin, blood glucose, blood glucose monitoring, blood glucose monitoring, cholecalciferol, cialis, cialis, co-enzyme q10, freestyle lilly reader, continuous blood glucose monitoring, exenatide er, gabapentin, humalog kwikpen, insulin degludec, insulin lispro, insulin lispro, pen needles, bd insulin syringe ultrafine, ketoconazole, thin, lisinopril, metformin, multiple vitamins-minerals, omega-3 fatty acids, pantoprazole, and semaglutide.    ROS     ROS: 10 point ROS neg other than the symptoms noted above in the HPI.    Physical Exam   VS: /63 (BP Location: Left arm, Patient Position: Chair, Cuff Size: Adult Large)  Pulse 77  Temp 97.6  F (36.4  C) (Oral)  Wt 108.6 kg (239 lb 8 oz)  BMI 33.78 kg/m2  GENERAL: NAD, well dressed, answering questions appropriately, appears stated age.  HEENT: no exopthalmous, no proptosis, no lig lag, no retraction, no scleral  icterus  RESPIRATORY: Clear bilaterally.  Normal respiratory effort.  CARDIOVASCULAR: RRR.  No peripheral edema.  EXTREMITIES: no edema, feet with diminished plantar sensation right foot and absent plantar sensation left foot, prior great toe amputations, complete on the left, partial on the right, no open lesions.  NEUROLOGY: CN grossly intact, no tremors  MSK: grossly intact, No digital cyanosis. Normal gait and station.  SKIN: no rashes, no lesions, no ulcers  PSYCH: Intact judgment and insight. A&OX3 with a cordial affect.    LABS:  A1c:   Component    Latest Ref Rng & Units 3/31/2017 12/27/2017 3/20/2018   Hemoglobin A1C    4.3 - 6.0 % 7.5 (H) 9.6 (H) 7.5 (H)     Basic Metabolic Panel:  !COMPREHENSIVE Latest Ref Rng & Units 12/27/2017   SODIUM 133 - 144 mmol/L 139   POTASSIUM 3.4 - 5.3 mmol/L 4.4   CHLORIDE 94 - 109 mmol/L 105   CO2 mmol/L    BUN 7 - 30 mg/dL 20   Creatinine 0.66 - 1.25 mg/dL    Creatinine 0.66 - 1.25 mg/dL 0.83   Glucose 70 - 99 mg/dL 255 (H)   ANION GAP 3 - 14 mmol/L 8   CALCIUM 8.5 - 10.1 mg/dL 8.3 (L)     LFTS/Cholesterol Panel:  !LIPID/HEPATIC Latest Ref Rng & Units 12/27/2017   CHOLESTEROL <200 mg/dL 96   TRIGLYCERIDES <150 mg/dL 167 (H)   HDL CHOLESTEROL >39 mg/dL 26 (L)   LDL CHOLESTEROL DIRECT 0 - 129 mg/dL    LDL CHOLESTEROL, CALCULATED <100 mg/dL 37   VLDL-CHOLESTEROL 0 - 30 mg/dL    NON HDL CHOLESTEROL <130 mg/dL 70   CHOLESTEROL/HDL RATIO 0.0 - 5.0    AST 0 - 45 U/L 40   ALT 0 - 70 U/L 53     Thyroid Function:   !THYROID Latest Ref Rng & Units 12/27/2017   TSH 0.40 - 4.00 mU/L 1.78     Urine MicroAlbumin:  Component    Latest Ref Rng & Units 12/27/2017   Creatinine Urine    mg/dL 95   Albumin Urine mg/L    mg/L 23   Albumin Urine mg/g Cr    0 - 17 mg/g Cr 24.55 (H)     Vitamin D:    All pertinent notes, labs, and images personally reviewed by me.     A/P  Mr.Patrick AKASH Rojas is a 58 year old here for the evaluation/management of diabetes:    1. DM2 - Uncontrolled  Decrease Tresiba  to 44 units every day.  If low blood sugars continue, decrease Tresiba further to 40 units qd  Continue Metformin 1000 mg bid,  There is some variability among people, most will usually develop symptoms suggestive of hypoglycemia when blood glucose levels are lowered to the mid 60's. The first set of symptoms are called adrenergic. Patients may experience any of the following nervousness, sweating, intense hunger, trembling, weakness, palpitations, and difficulty speaking.   The acute management of hypoglycemia involves the rapid delivery of a source of easily absorbed sugar. Regular soda, juice, lifesavers, table sugar, are good options. 15 grams of glucose is the dose that is given, followed by an assessment of symptoms and a blood glucose check if possible. If after 10 minutes there is no improvement, another 10-15 grams should be given. This can be repeated up to three times. The equivalency of 10-15 grams of glucose (approximate servings) are: 3-5 hard candies, 3 teaspoons of sugar, or 1/2 cup of regular soda or juice.      2. Hypertension - Controlled.    3. Hyperlipidemia - On statin therapy    Labs ordered today:   Orders Placed This Encounter   Procedures     FLU VACCINE, (RIV4) RECOMBINANT HA  , IM (FluBlok, egg free) [66435]- >18 YRS (FMG recommended  50-64 YRS)     Vaccine Administration, Initial [51887]     Hemoglobin A1c       Radiology/Consults ordered today: C RIV4 (FLUBLOK) VACCINE RECOMBINANT DNA PRSRV ANTIBIO FREE, IM  HC VACCINE ADMINISTRATION, INITIAL    All questions were answered.  The patient indicates understanding of the above issues and agrees with the plan set forth.  Total face to face time greater than or equal to 25 minutes.       Follow-up:  3 months    Jasmin Arteaga NP  Endocrinology  [facility]  CC: Aden Lopez      Injectable Influenza Immunization Documentation    1.  Is the person to be vaccinated sick today?   No    2. Does the person to be vaccinated have  an allergy to a component   of the vaccine?   No  Egg Allergy Algorithm Link    3. Has the person to be vaccinated ever had a serious reaction   to influenza vaccine in the past?   No    4. Has the person to be vaccinated ever had Guillain-Barré syndrome?   No    Form completed by SUDHIR Rosas  Here is your a1c result for your records.  Jasmin Arteaga NP  Endocrinology     Again, thank you for allowing me to participate in the care of your patient.        Sincerely,        SARAH Raymundo CNP

## 2018-10-11 NOTE — PROGRESS NOTES
Name: Crispin Rojas  Seen today for Diabetes (Last seen 6/7/2018).  HPI:  Crispin Rojas is a 58 year old male who presents for the evaluation/management of:    1. Type 2 DM:  Originally diagnosed at the age of: 1997.  Insulin treated 15+ years.    Current Regimen: Metformin 1000 mg bid, Ozempic 1 mg weekly , Tresiba 55 units q am Humalog 35 units (full meal) or 17 units for a snack tid + 2:50>150.  Takes 1/2 dose of Tresiba if going to be very active.    Started Bydureon 5/2018 - tolerating well.    Changed to Ozempic 6/2018 - feels Ozempic is working better  Using personal CGM - noting BG spikes after meals but eventually decreases back to target range.  Only eats 1-2 meals per day, still takes Humalog tid - lower dose if eating a snack instead of a full meal, correction dose only if not eating.    BS checks: 3-5 times per day, + Corwin  Meter Download: forgot meter  Corwin CGM download:     Working with diabetes ed - last seen by June Walker 4/20/2018.    Taking gabapenin per neurology for ear/nerve problem.    Complications:   Diabetes Complications  Description / Detail    Diabetic Retinopathy   + diabetic retinopathy, next appointment with retinal specialist 6/21, last laser treatment 10/2016  Regular opthalmoologist visit 3/2018   CAD / PAD  No   Neuropathy   Yes, sees Dr. Molina, last seen 2/12/2018 - no open foot lesions at this time.   Nephropathy / Microalbuminuria  Yes, elevated microalbumin   Gastroparesis  No   Hypoglycemia Unawareness  No       2. Hypertension: Blood Pressure today:   BP Readings from Last 3 Encounters:   10/11/18 125/63   07/16/18 141/78   06/23/18 130/80   .  Blood pressure medications include Amlodipine 5 mg qd, lisinopril 20 mg qd.  Takes medications everyday without forgetting a dose.  Denies feeling lightheaded or dizzy.  3. Hyperlipidemia: Takes Atorvastatin 40 mg qd for lipid control.  Denies muscle aches of pains.   4. Prevention:  Flu Shot-   Pneumovax-  11/2015  Opthalmology-annaully  Dental-recommend semiannually  ASA-  Smoking-   PMH/PSH:  Past Medical History:   Diagnosis Date     Cerebral infarction (H)      Chronic infection      H/O heartburn      History of heartburn      Hypertension      Numbness and tingling      Obesity      Renal stone      Type II or unspecified type diabetes mellitus without mention of complication, not stated as uncontrolled      Past Surgical History:   Procedure Laterality Date     AMPUTATE FOOT Left 8/18/2016    Procedure: AMPUTATE FOOT;  Surgeon: Jack Younger DPM;  Location: [facility]     AMPUTATE TOE(S) Right 2/1/2016    Procedure: AMPUTATE TOE(S);  Surgeon: Rachelle Manriquez DPM, Pod;  Location: [facility] OR     ANGIOGRAM       COLONOSCOPY       COMBINED CYSTOSCOPY, RETROGRADES, URETEROSCOPY, INSERT STENT Left 8/21/2016    Procedure: COMBINED CYSTOSCOPY, RETROGRADES, URETEROSCOPY, INSERT STENT;  Surgeon: Artemio Valenzuela MD;  Location: [facility] OR     COMBINED CYSTOSCOPY, RETROGRADES, URETEROSCOPY, LASER HOLMIUM LITHOTRIPSY URETER(S), INSERT STENT Left 10/3/2016    Procedure: COMBINED CYSTOSCOPY, RETROGRADES, URETEROSCOPY, LASER HOLMIUM LITHOTRIPSY URETER(S), INSERT STENT;  Surgeon: Artemio Valenzuela MD;  Location: [facility] OR     EXTRACORPOREAL SHOCK WAVE LITHOTRIPSY (ESWL) Left 9/8/2016    Procedure: EXTRACORPOREAL SHOCK WAVE LITHOTRIPSY (ESWL);  Surgeon: Artemio Valenzuela MD;  Location: [facility]     RECESSION GASTROCNEMIUS Right 2/1/2016    Procedure: RECESSION GASTROCNEMIUS;  Surgeon: Rachelle Manriquez DPM, Pod;  Location: [facility] OR     Family Hx:  Family History   Problem Relation Age of Onset     Arthritis Mother      SLE     Connective Tissue Disorder Mother      lupus     Diabetes Mother      Cerebrovascular Disease Mother      Cancer Mother      Diabetes Father      Diabetes Maternal Grandfather      Diabetes Sister      Thyroid disease:          DM2: Yes: Strong family history on mothers side - mom, 7 maternal uncles, two  maternal aunts, + MGF.         Autoimmune: DM1, SLE, RA, Vitiligo Yes: Lupus    Social Hx:  Social History     Social History     Marital status:      Spouse name: Sydney     Number of children: 2     Years of education: N/A     Occupational History     marketing [employer]     Social History Main Topics     Smoking status: Never Smoker     Smokeless tobacco: Never Used     Alcohol use 0.0 oz/week     0 Standard drinks or equivalent per week      Comment: rare---red wine 2x per week     Drug use: No     Sexual activity: Yes     Partners: Female     Other Topics Concern     Parent/Sibling W/ Cabg, Mi Or Angioplasty Before 65f 55m? No     Social History Narrative          MEDICATIONS:  has a current medication list which includes the following prescription(s): amlodipine, atorvastatin, blood glucose, blood glucose monitoring, blood glucose monitoring, cholecalciferol, cialis, cialis, co-enzyme q10, freestyle lilly reader, continuous blood glucose monitoring, exenatide er, gabapentin, humalog kwikpen, insulin degludec, insulin lispro, insulin lispro, pen needles, bd insulin syringe ultrafine, ketoconazole, thin, lisinopril, metformin, multiple vitamins-minerals, omega-3 fatty acids, pantoprazole, and semaglutide.    ROS     ROS: 10 point ROS neg other than the symptoms noted above in the HPI.    Physical Exam   VS: /63 (BP Location: Left arm, Patient Position: Chair, Cuff Size: Adult Large)  Pulse 77  Temp 97.6  F (36.4  C) (Oral)  Wt 108.6 kg (239 lb 8 oz)  BMI 33.78 kg/m2  GENERAL: NAD, well dressed, answering questions appropriately, appears stated age.  HEENT: no exopthalmous, no proptosis, no lig lag, no retraction, no scleral icterus  RESPIRATORY: Clear bilaterally.  Normal respiratory effort.  CARDIOVASCULAR: RRR.  No peripheral edema.  EXTREMITIES: no edema, feet with diminished plantar sensation right foot and absent plantar sensation left foot, prior great toe amputations,  complete on the left, partial on the right, no open lesions.  NEUROLOGY: CN grossly intact, no tremors  MSK: grossly intact, No digital cyanosis. Normal gait and station.  SKIN: no rashes, no lesions, no ulcers  PSYCH: Intact judgment and insight. A&OX3 with a cordial affect.    LABS:  A1c:   Component    Latest Ref Rng & Units 3/31/2017 12/27/2017 3/20/2018   Hemoglobin A1C    4.3 - 6.0 % 7.5 (H) 9.6 (H) 7.5 (H)     Basic Metabolic Panel:  !COMPREHENSIVE Latest Ref Rng & Units 12/27/2017   SODIUM 133 - 144 mmol/L 139   POTASSIUM 3.4 - 5.3 mmol/L 4.4   CHLORIDE 94 - 109 mmol/L 105   CO2 mmol/L    BUN 7 - 30 mg/dL 20   Creatinine 0.66 - 1.25 mg/dL    Creatinine 0.66 - 1.25 mg/dL 0.83   Glucose 70 - 99 mg/dL 255 (H)   ANION GAP 3 - 14 mmol/L 8   CALCIUM 8.5 - 10.1 mg/dL 8.3 (L)     LFTS/Cholesterol Panel:  !LIPID/HEPATIC Latest Ref Rng & Units 12/27/2017   CHOLESTEROL <200 mg/dL 96   TRIGLYCERIDES <150 mg/dL 167 (H)   HDL CHOLESTEROL >39 mg/dL 26 (L)   LDL CHOLESTEROL DIRECT 0 - 129 mg/dL    LDL CHOLESTEROL, CALCULATED <100 mg/dL 37   VLDL-CHOLESTEROL 0 - 30 mg/dL    NON HDL CHOLESTEROL <130 mg/dL 70   CHOLESTEROL/HDL RATIO 0.0 - 5.0    AST 0 - 45 U/L 40   ALT 0 - 70 U/L 53     Thyroid Function:   !THYROID Latest Ref Rng & Units 12/27/2017   TSH 0.40 - 4.00 mU/L 1.78     Urine MicroAlbumin:  Component    Latest Ref Rng & Units 12/27/2017   Creatinine Urine    mg/dL 95   Albumin Urine mg/L    mg/L 23   Albumin Urine mg/g Cr    0 - 17 mg/g Cr 24.55 (H)     Vitamin D:    All pertinent notes, labs, and images personally reviewed by me.     A/P  Mr.Patrick AKASH Rojas is a 58 year old here for the evaluation/management of diabetes:    1. DM2 - Uncontrolled  Decrease Tresiba to 44 units every day.  If low blood sugars continue, decrease Tresiba further to 40 units qd  Continue Metformin 1000 mg bid,  There is some variability among people, most will usually develop symptoms suggestive of hypoglycemia when blood glucose levels  are lowered to the mid 60's. The first set of symptoms are called adrenergic. Patients may experience any of the following nervousness, sweating, intense hunger, trembling, weakness, palpitations, and difficulty speaking.   The acute management of hypoglycemia involves the rapid delivery of a source of easily absorbed sugar. Regular soda, juice, lifesavers, table sugar, are good options. 15 grams of glucose is the dose that is given, followed by an assessment of symptoms and a blood glucose check if possible. If after 10 minutes there is no improvement, another 10-15 grams should be given. This can be repeated up to three times. The equivalency of 10-15 grams of glucose (approximate servings) are: 3-5 hard candies, 3 teaspoons of sugar, or 1/2 cup of regular soda or juice.      2. Hypertension - Controlled.    3. Hyperlipidemia - On statin therapy    Labs ordered today:   Orders Placed This Encounter   Procedures     FLU VACCINE, (RIV4) RECOMBINANT HA  , IM (FluBlok, egg free) [68004]- >18 YRS (FMG recommended  50-64 YRS)     Vaccine Administration, Initial [10286]     Hemoglobin A1c       Radiology/Consults ordered today: C RIV4 (FLUBLOK) VACCINE RECOMBINANT DNA PRSRV ANTIBIO FREE, IM  HC VACCINE ADMINISTRATION, INITIAL    All questions were answered.  The patient indicates understanding of the above issues and agrees with the plan set forth.  Total face to face time greater than or equal to 25 minutes.       Follow-up:  3 months    Jasmin Arteaga NP  Endocrinology  [facility]  CC: Aden Lopez

## 2018-10-11 NOTE — MR AVS SNAPSHOT
After Visit Summary   10/11/2018    Crispin Rojas    MRN: [id number]           Patient Information     Date Of Birth          1960        Visit Information        Provider Department      10/11/2018 7:00 AM Jasmin Arteaga APRN CNP [facility]        Today's Diagnoses     Type 2 diabetes mellitus with peripheral neuropathy (H)    -  1    Need for prophylactic vaccination and inoculation against influenza        Type 2 diabetes mellitus with diabetic peripheral angiopathy and gangrene, with long-term current use of insulin (H)          Care Instructions    Decrease Tresiba to 44 units once daily.  If lows continued, decrease further to 40 units.    Look into a product called Simpatch (can find on Amazon).  It covers and protects the sensor.                  Follow-ups after your visit        Additional Services     DIABETES EDUCATOR REFERRAL       DIABETES SELF MANAGEMENT TRAINING (DSMT)      Your provider has referred you to Diabetes Education: FMG: Diabetes Education - All [facility] [phone]   [email or web address]     If an urgent visit is needed or A1C is above 12, Care Team to call the Diabetes  Education Team at [phone] or send an In Basket message to the Diabetes Education Pool (P DIAB ED-PATIENT CARE).    A  will call you to make your appointment. If it has been more than 3 business days since your referral was placed, please call the above phone number to schedule.    Type of training and number of hours: Previous Diagnosis: Follow-up DSMT - 2 hours.    Diabetes Type: Type 2 - On Insulin   Medicare covers: 10 hours of initial DSMT in 12 month period from the time of first visit, plus 2 hours of follow-up DSMT annually, and additional hours as requested for insulin training.         Diabetes Co-Morbidities: dyslipidemia, hypertension, kidney disease, neuropathy and retinopathy      ?  Hypoglycemic unawareness - wasn't feeling low when sensor showed lows.         A1C Goal:  <7.0       A1C is: Lab Results       Component                Value               Date                       A1C                      7.6                 10/11/2018              MEDICAL NUTRITION THERAPY (MNT) for Diabetes    Medical Nutrition Therapy with a Registered Dietitian can be provided in coordination with Diabetes Self-Management Training to assist in achieving optimal diabetes management.    MNT Type and Hours: Do not initiate MNT at this time.                       Medicare will cover: 3 hours initial MNT in 12 month period after first visit, plus 2 hours of follow-up MNT annually        Diabetes Education Topics: Insulin Pump Therapy: Pre-Pump Start Education    Special Educational Needs Requiring Individual DSMT: None      Please be aware that coverage of these services is subject to the terms and limitations of your health insurance plan.  Call member services at your health plan to determine Diabetes Self-Management Training (Codes  and ) and Medical Nutrition Therapy (Codes 53171 and 47405) benefits and ask which blood glucose monitor brands are covered by your plan.  Please bring the following with you to your appointment:    (1)  List of current medications   (2)  List of Blood Glucose Monitor brands that are covered by your insurance plan  (3)  Blood Glucose Monitor and log book  (4)   Food records for the 3 days prior to your visit    The Certified Diabetes Educator may make diabetes medication adjustments per the CDE Protocol and Collaborative Practice Agreement.                  Your next 10 appointments already scheduled     Oct 18, 2018  1:30 PM CDT   HST  with BED 7 SH SLEEP   [place] [facility] ([facility])    [address] [address]   [phone]            Oct 19, 2018 10:00 AM CDT   HST Drop Off with BED 7 SH SLEEP    [facility] ([facility])    [address]  [place] [address]   [phone]            Oct 31, 2018  8:30 AM CDT   Return Sleep Patient with David Evans MD   [facility] ([facility])    [address]  [place] [address]   [phone]            Nov 09, 2018  8:00 AM CST   Nurse Only with ADDISON SCHRADER   [facility] ([facility])    [address] [facility] [address]   [phone]            Nov 09, 2018 10:30 AM CST   Diabetes Education with June Walker RD   [facility] [place] ([facility])    [address] [facility] [id number] [phone]            Nathony 10, 2019  2:00 PM CST   Return Visit with SARAH Raymundo CNP   [facility] ([facility])    [address] [facility] [address] [phone]              Who to contact     If you have questions or need follow up information about today's clinic visit or your schedule please contact [facility] directly at [phone].  Normal or non-critical lab and imaging results will be communicated to you by [employer], letter or phone within 4 business days after the clinic has received the results. If you do not hear from us within 7 days, please contact the clinic through MyChart or phone. If you have a critical or abnormal lab result, we will notify you by phone as soon as possible.  Submit refill requests through [employer] or call your pharmacy and they will forward the refill request to us. Please allow 3 business days for your refill to be completed.          Additional Information About Your Visit        [employer] Information     [employer] gives you secure access to your electronic health record. If you see a primary care provider, you can also send messages to your  care team and make appointments. If you have questions, please call your primary care clinic.  If you do not have a primary care provider, please call [phone] and they will assist you.        Care EveryWhere ID     This is your Care EveryWhere ID. This could be used by other organizations to access your [place] medical records  [id number]        Your Vitals Were     Pulse Temperature BMI (Body Mass Index)             77 97.6  F (36.4  C) (Oral) 33.78 kg/m2          Blood Pressure from Last 3 Encounters:   10/11/18 125/63   07/16/18 141/78   06/23/18 130/80    Weight from Last 3 Encounters:   10/11/18 108.6 kg (239 lb 8 oz)   10/10/18 109.4 kg (241 lb 1.6 oz)   09/12/18 110.4 kg (243 lb 6.4 oz)              We Performed the Following     DIABETES EDUCATOR REFERRAL     FLU VACCINE, (RIV4) RECOMBINANT HA  , IM (FluBlok, egg free) [21851]- >18 YRS (FMG recommended  50-64 YRS)     Hemoglobin A1c     Vaccine Administration, Initial [[id number]]          Today's Medication Changes          These changes are accurate as of 10/11/18  8:02 AM.  If you have any questions, ask your nurse or doctor.               These medicines have changed or have updated prescriptions.        Dose/Directions    continuous blood glucose monitoring sensor   This may have changed:  additional instructions   Used for:  Type 2 diabetes mellitus with diabetic peripheral angiopathy and gangrene, with long-term current use of insulin (H)   Changed by:  Jasmin Arteaga APRN CNP        For use with Freestyle Corwin Flash  for continuous monitioring of blood glucose levels. Replace sensor every 7-10 days.   Quantity:  4 each   Refills:  3       * insulin lispro 100 UNIT/ML injection   Commonly known as:  HumaLOG KWIKpen   This may have changed:  Another medication with the same name was removed. Continue taking this medication, and follow the directions you see here.   Used for:  Type 2 diabetes mellitus with peripheral neuropathy (H)    Changed by:  Jasmin Arteaga APRN CNP        Inject 35 units sq tid.  Dispense 90-day supply or as allowed by insurance.   Quantity:  30 mL   Refills:  3       * HumaLOG KWIKpen 100 UNIT/ML injection   This may have changed:  Another medication with the same name was removed. Continue taking this medication, and follow the directions you see here.   Used for:  Type 2 diabetes mellitus with peripheral neuropathy (H)   Generic drug:  insulin lispro   Changed by:  Jasmin Arteaga APRN CNP        INJECT 35 UNITS SUB-Q THREE TIMES DAILY   Quantity:  30 mL   Refills:  3       * Notice:  This list has 2 medication(s) that are the same as other medications prescribed for you. Read the directions carefully, and ask your doctor or other care provider to review them with you.      Stop taking these medicines if you haven't already. Please contact your care team if you have questions.     exenatide ER 2 MG SQ pen for weelky inj   Commonly known as:  BYDUREON   Stopped by:  Jasmin Arteaga APRN CNP                Where to get your medicines      These medications were sent to [facility] [address] [place] [address] E AT [facility] [address]  [address] [facility] [address], [facility] [address]     Phone:  [phone]     continuous blood glucose monitoring sensor                Primary Care Provider Office Phone # Fax #    Aden Lopez [phone] [address] [facility] [address]        Equal Access to Services     [facility] CenterJUVENTINO : Hadii geraldine arechiga hadasho Soomaali, waaxda luqadaha, qaybta kaalmada adeegyada, blanca dahl adejefry rhodes. So [facility] [phone].    ATENCIÓN: Si habla español, tiene a gomez disposición servicios gratuitos de asistencia lingüística. Michela al [phone].    We comply with applicable federal civil rights laws and [place] laws. We do not discriminate on the basis of race, color, national origin, age, disability, sex, sexual  "orientation, or gender identity.            Thank you!     Thank you for choosing [facility]  for your care. Our goal is always to provide you with excellent care. Hearing back from our patients is one way we can continue to improve our services. Please take a few minutes to complete the written survey that you may receive in the mail after your visit with us. Thank you!             Your Updated Medication List - Protect others around you: Learn how to safely use, store and throw away your medicines at www.disposemymeds.org.          This list is accurate as of 10/11/18  8:02 AM.  Always use your most recent med list.                   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 Use one syringe  daily or as directed.    Type 2 diabetes, HbA1C goal < 8% (H)       blood glucose lancets standard    no brand specified    300 each    Use to test blood sugar 3 times daily or as directed.    Type 2 diabetes mellitus with diabetic neuropathy, with long-term current use of insulin (H)       blood glucose monitoring meter device kit    no brand specified    1 kit    Use to test blood sugar 3 times daily or as directed.    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)       * CIALIS 5 MG tablet   Generic drug:  tadalafil     30 tablet    TAKE 1 " TABLET BY MOUTH EVERY DAY AS NEEDED    Erectile dysfunction due to diseases classified elsewhere       * CIALIS 5 MG tablet   Generic drug:  tadalafil     30 tablet    TAKE 1 TABLET BY MOUTH EVERY DAY AS NEEDED    Erectile dysfunction due to diseases classified elsewhere       Co-Enzyme Q10 100 MG Caps      Take 100 mg by mouth daily        continuous blood glucose monitoring sensor     4 each    For use with Freestyle Corwin Flash  for continuous monitioring of blood glucose levels. Replace sensor every 7-10 days.    Type 2 diabetes mellitus with diabetic peripheral angiopathy and gangrene, with long-term current use of insulin (H)       FREESTYLE CORWIN READER Iman     1 Device    1 Device as needed    Type 2 diabetes mellitus with diabetic peripheral angiopathy and gangrene, with long-term current use of insulin (H)       gabapentin 100 MG capsule    NEURONTIN     Take 300 mg by mouth At Bedtime        insulin degludec 100 UNIT/ML pen    TRESIBA    30 mL    Inject 55 Units Subcutaneous daily    Type 2 diabetes mellitus with diabetic peripheral angiopathy and gangrene, with long-term current use of insulin (H)       * insulin lispro 100 UNIT/ML injection    HumaLOG KWIKpen    30 mL    Inject 35 units sq tid.  Dispense 90-day supply or as allowed by insurance.    Type 2 diabetes mellitus with peripheral neuropathy (H)       * HumaLOG KWIKpen 100 UNIT/ML injection   Generic drug:  insulin lispro     30 mL    INJECT 35 UNITS SUB-Q THREE TIMES DAILY    Type 2 diabetes mellitus with peripheral neuropathy (H)       ketoconazole 2 % shampoo    NIZORAL    120 mL    APPLY TO THE AFFECTED AREA AND WASH OFF AFTER 5 MINUTES.    Seborrheic dermatitis       lisinopril 20 MG tablet    PRINIVIL/ZESTRIL    90 tablet    Take 1 tablet (20 mg) by mouth daily    Essential hypertension       metFORMIN 500 MG 24 hr tablet    GLUCOPHAGE-XR    360 tablet    TAKE 2 TABLETS BY MOUTH TWICE DAILY WITH MEALS    Diabetes mellitus type 2  with neurological manifestations (H)       MULTIVITAMIN PO      Take 1 tablet by mouth daily        OMEGA-3 FISH OIL PO      Take 2 g by mouth daily        Pen Needles 31G X 5 MM Misc     200 each    1 Device 5 times daily , TWICE DAILY WITH LANTUS AND 3 TIMES A DAY PRN WITH NOVOLOG FLEXPEN    Type 2 diabetes mellitus with peripheral neuropathy (H)       Semaglutide 1 MG/DOSE Sopn    OZEMPIC    1 pen    Inject 1 mg Subcutaneous once a week    Type 2 diabetes mellitus with peripheral neuropathy (H)       VITAMIN D3 PO      Take 1,000 Units by mouth daily        * Notice:  This list has 4 medication(s) that are the same as other medications prescribed for you. Read the directions carefully, and ask your doctor or other care provider to review them with you.

## 2018-10-11 NOTE — PROGRESS NOTES

## 2018-10-11 NOTE — PATIENT INSTRUCTIONS
Decrease Tresiba to 44 units once daily.  If lows continued, decrease further to 40 units.    Look into a product called StyleSaint (can find on [employer]).  It covers and protects the sensor.

## 2018-10-18 ENCOUNTER — OFFICE VISIT (OUTPATIENT)
Dept: SLEEP MEDICINE | Facility: CLINIC | Age: 58
End: 2018-10-18
Payer: COMMERCIAL

## 2018-10-18 DIAGNOSIS — G47.33 OSA (OBSTRUCTIVE SLEEP APNEA): ICD-10-CM

## 2018-10-18 PROCEDURE — G0399 HOME SLEEP TEST/TYPE 3 PORTA: HCPCS | Performed by: INTERNAL MEDICINE

## 2018-10-18 NOTE — MR AVS SNAPSHOT
After Visit Summary   10/18/2018    Crispin Rojas    MRN: [id number]           Patient Information     Date Of Birth          1960        Visit Information        Provider Department      10/18/2018 1:30 PM BED 7  SLEEP [facility]        Today's Diagnoses     GILA (obstructive sleep apnea)           Follow-ups after your visit        Your next 10 appointments already scheduled     Oct 31, 2018  8:30 AM CDT   Return Sleep Patient with David Evans MD   [facility] ([facility])    [address] [address]   [phone]            Nov 09, 2018  8:00 AM CST   [facility]urse Only with ADDISON SCHRADER   [facility]    [address] [facility] [address] [phone]            Nov 09, 2018 10:30 AM CST   Diabetes Education with June Walker RD   [facility] ([facility])    [address] [facility] [address] [phone]            Anthony 10, 2019  2:00 PM CST   Return Visit with SARAH Raymundo CNP   [facility]    [address] [facility] [address] [phone]              Who to contact     If you have questions or need follow up information about today's clinic visit or your schedule please contact [facility] directly at [phone].  Normal or non-critical lab and imaging results will be communicated to you by MyChart, letter or phone within 4 business days after the clinic has received the results. If you do not hear from us within 7 days, please contact the clinic through MyChart or phone. If you have a critical or abnormal lab result, we will notify you by phone as soon as possible.  Submit refill requests through [employer] or call your pharmacy and they will forward the refill request to us. Please  allow 3 business days for your refill to be completed.          Additional Information About Your Visit        MyChart Information     [facility] gives you secure access to your electronic health record. If you see a primary care provider, you can also send messages to your care team and make appointments. If you have questions, please call your primary care clinic.  If you do not have a primary care provider, please call [phone] and they will assist you.        Care EveryWhere ID     This is your Care EveryWhere ID. This could be used by other organizations to access your [place] medical records  [id number]         Blood Pressure from Last 3 Encounters:   10/11/18 125/63   07/16/18 141/78   06/23/18 130/80    Weight from Last 3 Encounters:   10/11/18 108.6 kg (239 lb 8 oz)   10/10/18 109.4 kg (241 lb 1.6 oz)   09/12/18 110.4 kg (243 lb 6.4 oz)              Today, you had the following     No orders found for display       Primary Care Provider Office Phone # Fax #    Aden Ethan Lopez [phone] [address] [facility] [address]        Equal Access to Services     [facility]: Hadii aad ku hadasho Soomaali, waaxda luqadaha, qaybta kaalmada adeegyada, blanca tellez haymistyn sabina rhodes. So [facility] [phone].    ATENCIÓN: Si habla español, tiene a gomez disposición servicios gratuitos de asistencia lingüística. Llame al [phone].    We comply with applicable federal civil rights laws and [place] laws. We do not discriminate on the basis of race, color, national origin, age, disability, sex, sexual orientation, or gender identity.            Thank you!     Thank you for choosing [facility]  for your care. Our goal is always to provide you with excellent care. Hearing back from our patients is one way we can continue to improve our services. Please take a few minutes to complete the written survey that you may receive in the mail after your visit with  "us. Thank you!             Your Updated Medication List - Protect others around you: Learn how to safely use, store and throw away your medicines at www.disposemymeds.org.          This list is accurate as of 10/18/18 11:59 PM.  Always use your most recent med list.                   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 Use one syringe  daily or as directed.    Type 2 diabetes, HbA1C goal < 8% (H)       blood glucose lancets standard    no brand specified    300 each    Use to test blood sugar 3 times daily or as directed.    Type 2 diabetes mellitus with diabetic neuropathy, with long-term current use of insulin (H)       blood glucose monitoring meter device kit    no brand specified    1 kit    Use to test blood sugar 3 times daily or as directed.    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 Replace sensor every 7-10 days.    Type 2 diabetes mellitus with diabetic peripheral angiopathy and gangrene, with long-term current use of insulin (H)       FREESTYLE CORWIN READER Iman     1 Device    1 Device as needed    Type 2 diabetes mellitus with diabetic peripheral angiopathy and gangrene, with long-term current use of insulin (H)       gabapentin 100 MG capsule    NEURONTIN     Take 300 mg by mouth At Bedtime        insulin degludec 100 UNIT/ML pen    TRESIBA    30 mL    Inject 55 Units Subcutaneous daily    Type 2 diabetes mellitus with diabetic peripheral angiopathy and gangrene, with long-term current use of insulin (H)       * insulin lispro 100 UNIT/ML injection    HumaLOG KWIKpen    30 mL    Inject 35 units sq tid.  Dispense 90-day supply or as allowed by insurance.    Type 2 diabetes mellitus with peripheral neuropathy (H)       * HumaLOG KWIKpen 100 UNIT/ML injection   Generic drug:  insulin lispro     30 mL    INJECT 35 UNITS SUB-Q THREE TIMES DAILY    Type 2 diabetes mellitus with peripheral neuropathy (H)       ketoconazole 2 % shampoo    NIZORAL    120 mL    APPLY TO THE AFFECTED AREA AND WASH OFF AFTER 5 MINUTES.    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)       MULTIVITAMIN PO      Take 1 tablet by mouth daily        OMEGA-3 FISH OIL PO      Take 2 g by mouth daily        Pen Needles 31G X 5 MM Misc     200 each    1 Device 5 times daily , TWICE DAILY WITH LANTUS AND 3 TIMES A DAY PRN WITH NOVOLOG FLEXPEN    Type 2 diabetes mellitus with peripheral neuropathy (H)       Semaglutide 1 MG/DOSE  Sopn    OZEMPIC    1 pen    Inject 1 mg Subcutaneous once a week    Type 2 diabetes mellitus with peripheral neuropathy (H)       VITAMIN D3 PO      Take 1,000 Units by mouth daily        * Notice:  This list has 4 medication(s) that are the same as other medications prescribed for you. Read the directions carefully, and ask your doctor or other care provider to review them with you.

## 2018-10-19 ENCOUNTER — DOCUMENTATION ONLY (OUTPATIENT)
Dept: SLEEP MEDICINE | Facility: CLINIC | Age: 58
End: 2018-10-19
Payer: COMMERCIAL

## 2018-10-19 DIAGNOSIS — G47.33 OSA (OBSTRUCTIVE SLEEP APNEA): ICD-10-CM

## 2018-10-19 NOTE — PROGRESS NOTES
This HSAT was performed using a Noxturnal T3 device which recorded snore, sound, movement activity, body position, nasal pressure, oronasal thermal airflow, pulse, oximetry and both chest and abdominal respiratory effort. HSAT data was restricted to the time patient states they were in bed.     HSAT was scored using 1B 4% hypopnea rule.     HST AHI (Non-PAT): (P) 34.1  Snoring was reported as moderate and loud.  Time with SpO2 below 89% was 93.6 minutes.   Overall signal quality was good     Pt will follow up with sleep provider to determine appropriate therapy.

## 2018-10-20 DIAGNOSIS — L21.9 SEBORRHEIC DERMATITIS: ICD-10-CM

## 2018-10-22 PROBLEM — G47.33 OSA (OBSTRUCTIVE SLEEP APNEA): Status: ACTIVE | Noted: 2018-10-22

## 2018-10-22 RX ORDER — KETOCONAZOLE 20 MG/ML
SHAMPOO TOPICAL
Qty: 120 ML | Refills: 0 | Status: SHIPPED | OUTPATIENT
Start: 2018-10-22 | End: 2018-11-26

## 2018-10-22 NOTE — PROCEDURES
"HOME SLEEP STUDY INTERPRETATION    Patient: Crispin Rojas  MRN: [id number]  YOB: 1960  Study Date: 10/18/2018  Referring Provider: Aden Lopez;   Ordering Provider: David Evans MD, MD     Indications for Home Study: Crispin Rojas is a 58 year old male with a history of CVA, HTN, DM who presents with symptoms suggestive of obstructive sleep apnea.    Estimated body mass index is 33.78 kg/(m^2) as calculated from the following:    Height as of 18: 1.793 m (5' 10.6\").    Weight as of 10/11/18: 108.6 kg (239 lb 8 oz).  Total score - [place]: 8 (2018 10:23 AM)  STOP-BAN/8    Data: A full night home sleep study was performed recording the standard physiologic parameters including body position, movement, sound, nasal pressure, thermal oral airflow, chest and abdominal movements with respiratory inductance plethysmography, and oxygen saturation by pulse oximetry. Pulse rate was estimated by oximetry recording. This study was considered adequate based on > 4 hours of quality oximetry and respiratory recording. As specified by the AASM Manual for the Scoring of Sleep and Associated events, version 2.3, Rule VIII.D 1B, 4% oxygen desaturation scoring for hypopneas is used as a standard of care on all home sleep apnea testing.    Analysis Time:  470.4 minutes    Respiration:   Sleep Associated Hypoxemia: sustained hypoxemia was present. Baseline oxygen saturation was 90.4%.  Time with saturation less than or equal to 88% was 93.6 minutes. The lowest oxygen saturation was 72%.   Snoring: Snoring was present.  Respiratory events: The home study revealed a presence of 110 obstructive apneas and 2 mixed and central apneas. There were 155 hypopneas resulting in a combined apnea/hypopnea index [AHI] of 34.1 events per hour.  AHI was 48.4 per hour supine, - per hour prone, 3.6 per hour on left side, and 3.1 per hour on right side.   Pattern: Excluding events noted above, respiratory rate " and pattern was Normal.    Position: Percent of time spent: supine - 68%, prone - 0%, on left - 7%, on right - 24.7%.    Heart Rate: By pulse oximetry normal rate was noted.     Assessment:   Severe obstructive sleep apnea.  Sleep associated hypoxemia was present.    Recommendations:  Consider auto-CPAP at 5-15 cmH2O.  Suggest optimizing sleep hygiene and avoiding sleep deprivation.  Weight management.    Diagnosis Code(s): Obstructive Sleep Apnea G47.33, Hypoxemia G47.36    David Evans MD, MD, October 22, 2018   Diplomate, American Board of Psychiatry and Neurology, Sleep Medicine

## 2018-10-22 NOTE — TELEPHONE ENCOUNTER
"Requested Prescriptions   Pending Prescriptions Disp Refills     ketoconazole (NIZORAL) 2 % shampoo [Pharmacy Med Name: KETOCONAZOLE 2% SHAMPOO 120ML]  Last Written Prescription Date:  7/31/2018  Last Fill Quantity: 120 mL,  # refills: 1   Last office visit: 5/9/2018 with prescribing provider:  Jessica     Future Office Visit:   Next 5 appointments (look out 90 days)     Nov 09, 2018  8:00 AM CST   Nurse Only with ADDISON SCHRADER   [address] [address]   [phone]            Anthony 10, 2019  2:00 PM CST   Return Visit with SARAH Raymundo CNP   [address] [address]   [phone]                  120 mL 0     Sig: APPLY TO THE AFFECTED AREA AND WASH OFF AFTER 5 MINUTES.    Antifungal Agents Passed    10/20/2018  3:28 PM       Passed - Recent (12 mo) or future (30 days) visit within the authorizing provider's specialty    Patient had office visit in the last 12 months or has a visit in the next 30 days with authorizing provider or within the authorizing provider's specialty.  See \"Patient Info\" tab in inbasket, or \"Choose Columns\" in Meds & Orders section of the refill encounter.             Passed - Not Fluconazole or Terconazole     If oral Fluconazole or Terconazole, may refill if indicated in progress notes.             "

## 2018-11-06 DIAGNOSIS — N52.1 ERECTILE DYSFUNCTION DUE TO DISEASES CLASSIFIED ELSEWHERE: ICD-10-CM

## 2018-11-07 NOTE — TELEPHONE ENCOUNTER
"Requested Prescriptions   Pending Prescriptions Disp Refills     tadalafil (CIALIS) 5 MG tablet [Pharmacy Med Name: TADALAFIL 5MG TABLETS]    Last Written Prescription Date:  2/26/18  Last Fill Quantity: 30 tablets,  # refills: 0   Last office visit: 5/9/2018 with prescribing provider:  Jessica   Future Office Visit:   Next 5 appointments (look out 90 days)     Nov 09, 2018  8:00 AM CST   Nurse Only with Juan M Oshea   [address] [address]   [phone]            Anthony 10, 2019  2:00 PM CST   Return Visit with SARAH Raymudno CNP   [address] [address]   [phone]                  30 tablet 0     Sig: TAKE 1 TABLET BY MOUTH EVERY DAY AS NEEDED    Erectile Dysfuction Protocol Passed    11/6/2018  7:28 PM       Passed - Absence of nitrates on medication list       Passed - Absence of Alpha Blockers on Med list       Passed - Recent (12 mo) or future (30 days) visit within the authorizing provider's specialty    Patient had office visit in the last 12 months or has a visit in the next 30 days with authorizing provider or within the authorizing provider's specialty.  See \"Patient Info\" tab in inbasket, or \"Choose Columns\" in Meds & Orders section of the refill encounter.             Passed - Patient is age 18 or older          "

## 2018-11-08 RX ORDER — TADALAFIL 5 MG/1
TABLET ORAL
Qty: 30 TABLET | Refills: 1 | Status: SHIPPED | OUTPATIENT
Start: 2018-11-08 | End: 2019-06-24

## 2018-11-08 NOTE — TELEPHONE ENCOUNTER
Prescription approved per [facility] Refill Protocol.  June Thomas RN, BSN

## 2018-11-09 ENCOUNTER — ALLIED HEALTH/NURSE VISIT (OUTPATIENT)
Dept: EDUCATION SERVICES | Facility: CLINIC | Age: 58
End: 2018-11-09
Payer: COMMERCIAL

## 2018-11-09 ENCOUNTER — TELEPHONE (OUTPATIENT)
Dept: EDUCATION SERVICES | Facility: CLINIC | Age: 58
End: 2018-11-09

## 2018-11-09 ENCOUNTER — ALLIED HEALTH/NURSE VISIT (OUTPATIENT)
Dept: NURSING | Facility: CLINIC | Age: 58
End: 2018-11-09

## 2018-11-09 VITALS — WEIGHT: 245.1 LBS | BODY MASS INDEX: 34.57 KG/M2

## 2018-11-09 DIAGNOSIS — E11.42 TYPE 2 DIABETES MELLITUS WITH PERIPHERAL NEUROPATHY (H): ICD-10-CM

## 2018-11-09 DIAGNOSIS — E11.42 TYPE 2 DIABETES MELLITUS WITH PERIPHERAL NEUROPATHY (H): Primary | ICD-10-CM

## 2018-11-09 DIAGNOSIS — E66.01 MORBID OBESITY, UNSPECIFIED OBESITY TYPE (H): Primary | ICD-10-CM

## 2018-11-09 PROCEDURE — 99207 ZZC HEALTH COACHING, NO CHARGE: CPT

## 2018-11-09 PROCEDURE — G0108 DIAB MANAGE TRN  PER INDIV: HCPCS | Performed by: DIETITIAN, REGISTERED

## 2018-11-09 NOTE — PROGRESS NOTES
November 9, 2018    [address] [id number] [phone]  Health Coaching Progress Note    Patient Name: Crispin Rojas Date: November 9, 2018      Session Length: 30      DATA    PRM Master Survey Scores Reviewed: Yes    Core Healthy Days Survey:         ROCHELLE Score (Last Two) 6/21/2018   ROCHELLE Raw Score 30   Activation Score 56   ROCHELLE Level 3       PHQ-2 Score 6/21/2018 3/20/2018   PHQ-2 Total Score (Adult) - Positive if 3 or more points; Administer PHQ-9 if positive 0 0   MyC PHQ-2 Score - 0       PHQ-9 SCORE 3/20/2018 5/9/2018   Total Score MyChart Incomplete -   Total Score - 0       Treatment Objective(s) Addressed in This Session:  Target Behavior(s): diet/weight loss and disease management/lifestyle changes of working on getting back to old healthy habits-being more aware of dietary choices by reducing carb intake, adding in more fresh foods and protein, trying to find ways to be more active with busy schedule-walking more, returning to gym for workouts 4-5 days a week after Thanksgiving, getting more sleep each night/tries to get in 8 hours, managing diabetes well, losing weight and maintaining, returning equipment for at home sleep study as directed,  accessing resources and scheduling/attending appointments as needed-appt with diabetes ed, Jasmin Arteaga, sleep study, and needs to schedule with Dr. 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-now looking at new leads for work-occupying his time, not in a set routine right now, just completed sleep study-interested in results-appointment coming up soon, A1C up slightly to 7.6 from 7.5, med changes aren't working well so far, finding time to be active, being more disciplined with food choices, not getting enough sleep, unexplained spikes in blood sugar, ongoing shoulder pain-arthritis/limits  mobility, loves competitive sports (golf)/activities but hasn't been able to participate due to time constraints/injuries, work several jobs to pay the bills, wife is a good cook-eats too well sometimes, started gaining weight with insulin use, wants to lose weight and maintain it, managing diabetes well/avoiding other medications  What Patient Does Well:   1) Patient has had success with lifestyle changes in the past and feels he knows what he needs to do, but just struggles to find time to fit everything into his routine  Previous Successes:   1) Patient had a rough stretch where he has quit his job and went to a event with too many good foods-regained 5lbs, but still down 5lbs total!  Areas in Need of Improvement:   1) Maintaining a regular exercise routine  2) Making healthier dietary choices-more fresh foods, protein-cutting back on carbs  3) Losing weight and keeping it off  4) Managing shoulder pain/regaining strength  Barriers to Change:   1) Shoulder injury  2) Bad cycle of losing a lot of weight and then gaining it all back  3) Recently quit his job-now looking for new work/out of his routine  Reasons for Change:   1) Lose weight  2) Continue to manage diabetes  3) Figure out something with his shoulder pain so he can play golf again  Plan/Goal for the Next 4 Weeks:   GOAL #1: Be more aware of what you're eating at meals and snacks/work on reducing carbs, eat more fresh food items and add in more protein each day  GOAL #1 Progress Toward Goal: 75% (Was doing really well but went to event and slipped up-planning to get back on track again)  GOAL #2: Continue to fit in more physical activity each day as able/keep moving  GOAL #2 Progress Toward Goal: 100% Maintenance (Planning on starting up at the gym again after Thanksgiving/figuring out job leads for new work)  GOAL #3: Keep working on sleep schedule/try to get close to 8 hours each night as able  GOAL #3 Progress Toward Goal: 75% (Still working on it,  but does feel that he is doing better-in between jobs now)  GOAL #4: Attend upcoming medication check up/A1C lab appointment with Jasmin Arteaga  GOAL #4 Progress Toward Goal: 100% Completed (A1C was about the same at 7.6 up from 7.5)  GOAL #5: Attend follow-up with sleep study as scheduled/check out results  GOAL #5 Progress Toward Goal: 0% (Coming up soon)  GOAL #6: Schedule/attend annual physical with Dr. Lopez  GOAL #6 Progress Toward Goal: 0% New Goal  GOAL #7: Start regular workout routine at the gym after job is figured out and shoot for 4-5 days per week  GOAL #7 Progress Toward Goal: 0% New Goal    Intervention:  Motivational Interviewing    MI Intervention: Expressed Empathy/Understanding, Supported Autonomy, Collaboration, Evocation, Permission to raise concern or advise, Open-ended questions, Reflections: simple and complex, Rolled with resistance: Emphasized patient autonomy, Simple reflection, Complex reflection, Shifted topic to defuse resistance and Reframed sustain talk in the direction of change and Change talk (evoked)     Change Talk Expressed by the Patient: Desire to change Ability to change Reasons to change Need to change Committment to change Activation Taking steps    Provider Response to Change Talk: E - Evoked more info from patient about behavior change, A - Affirmed patient's thoughts, decisions, or attempts at behavior change, R - Reflected patient's change talk and S - Summarized patient's change talk statements    Assessment / Progress on Treatment Objective(s) / Homework:    Achieved / completed to satisfaction - MAINTENANCE (Working to maintain change, with risk of relapse); Intervened by continuing to positively reinforce healthy behavior choice   Minimal progress - PREPARATION (Decided to change - considering how); Intervened by negotiating a change plan and determining options / strategies for behavior change, identifying triggers, exploring social supports, and working towards  setting a date to begin behavior change  New Objective established this session - PREPARATION (Decided to change - considering how); Intervened by negotiating a change plan and determining options / strategies for behavior change, identifying triggers, exploring social supports, and working towards setting a date to begin behavior change  Satisfactory progress - ACTION (Actively working towards change); Intervened by reinforcing change plan / affirming steps taken         Plan: (Homework, other):  Patient was encouraged to continue to seek condition-related information and education, as well as schedule a follow up appointment with the Health  in 5 weeks. Patient has set self-identified goals and will monitor progress until the next appointment.  Scheduled our next coaching appointment for Friday December 21st at 8am.  Juan M Oshea

## 2018-11-09 NOTE — PATIENT INSTRUCTIONS
1. Count carbohydrates in grams for all meals and snacks    2. Keep food log with grams of carbohydrate for all meals and snacks    3. Check blood sugars 4-6 times a day.     4. Keep glucose and insulin log until next pre-pump visit.    June Walker RD, LD, CDE  Diabetes     [facility]:  For Your Diabetes Education or Nutrition Appointments Call:  [phone]   For Diabetes Education or Nutrition Related Questions Call:   Phone: [phone]  E-mail: [email or web address]  Fax: [phone]   If you need a medication refill please contact your pharmacy. Please allow 3 business days for your refills to be completed.

## 2018-11-09 NOTE — PROGRESS NOTES
Diabetes Self-Management Education & Support      Diabetes Self-Management Education & Support - Insulin Pump Pre-Start    SUBJECTIVE/OBJECTIVE  Presents for: Follow-up  Accompanied by: Self  Diabetes education in the past 24mo: Yes  Focus of Visit: Insulin Pump, CGM  Diabetes type: Type 2  Disease course: Getting harder to manage  How confident are you filling out medical forms by yourself:: Extremely  Transportation concerns: No  Other concerns:: None  Cultural Influences/Ethnic Background:  American    Time limited- as patient was 15 minutes late for appt.    Patient seen today for Insulin Pump Pre-Start:  Information packet(s) provided for the following pump(s): Medtronic 630G, Tandem t:slim X2, Omnipod  Patient is knowledgeable in the following insulin pump concept(s): Balancing glucose and insulin, Carbohydrate counting, Basic button pushing  Insulin Pump Start Plan: Patient would benefit from additional diabetes education visits prior to insulin pump start.      Diabetes Medication(s)     Biguanides Sig    metFORMIN (GLUCOPHAGE-XR) 500 MG 24 hr tablet TAKE 2 TABLETS BY MOUTH TWICE DAILY WITH MEALS    Insulin Sig    HUMALOG KWIKPEN 100 UNIT/ML soln INJECT 35 UNITS SUB-Q THREE TIMES DAILY    insulin degludec (TRESIBA) 100 UNIT/ML pen Inject 55 Units Subcutaneous daily    insulin lispro (HUMALOG KWIKPEN) 100 UNIT/ML injection Inject 35 units sq tid.  Dispense 90-day supply or as allowed by insurance.    Incretin Mimetic Agents (GLP-1 Receptor Agonists) Sig    Semaglutide (OZEMPIC) 1 MG/DOSE SOPN Inject 1 mg Subcutaneous once a week          ASSESSMENT  Pt interested in insulin pump therapy, due to BG variability- getting more difficult to manage. Has been using Corwin CGM- but did not have reader with him today.       INTERVENTION:   Diabetes knowledge and skills assessment:     Patient is knowledgeable in diabetes management concepts related to: Healthy Eating, Being Active, Monitoring, Taking Medication,  Problem Solving, Reducing Risks and Healthy Coping    Patient needs further education on the following diabetes management concepts: Monitoring, Taking Medication and Problem Solving    Based on learning assessment above, most appropriate setting for further diabetes education would be: Individual setting    Education provided today on:  AADE Self-Care Behaviors:  Monitoring: CGM options  Taking Medication: pro/cons of different pumps  Problem Solving: high blood glucose - causes, signs/symptoms, treatment and prevention and low blood glucose - causes, signs/symptoms, treatment and prevention    Opportunities for ongoing education and support in diabetes-self management were discussed.    Pt verbalized understanding of concepts discussed and recommendations provided today.       Education Materials Provided:  Informational packets for Medtronic, Omnipod and Tandem insulin pumps    Thinking About Using an Insulin Pump? handout      PLAN  See Patient Instructions for co-developed, patient-stated behavior change goals.  Pt is interested in the Dexcom G6 along with Tandem pump.    Will route pended rx for Dexcom G6 to Jasmin Arteaga NP for signature if agreeable with plan.  Patient has also filled out patient information form/ AOB - will fax toTandem.    CDE follow up 2 weeks.  AVS printed and provided to patient today. See Follow-Up section for recommended follow-up.    June Walker RD, CDE  Diabetes     Time Spent: 45 minutes  Encounter Type: Individual    Any diabetes medication dose changes were made via the CDE Protocol and Collaborative Practice Agreement with the patient's endocrinology provider. A copy of this encounter was shared with the provider.

## 2018-11-09 NOTE — TELEPHONE ENCOUNTER
Patient seen by DM Ed today. See Encounter for more details.     Routing pended Rx for Dexcom G6 to Jasmin Arteaga NP for signature if agreeable with plan.     June Walker RD, CDE  Diabetes

## 2018-11-09 NOTE — LETTER
11/9/2018         RE: Crispin Rojas  [address] [facility] [address]        Dear Colleague,    Thank you for referring your patient, Crispin Rojas, to the [facility]. Please see a copy of my visit note below.    Diabetes Self-Management Education & Support      Diabetes Self-Management Education & Support - Insulin Pump Pre-Start    SUBJECTIVE/OBJECTIVE  Presents for: Follow-up  Accompanied by: Self  Diabetes education in the past 24mo: Yes  Focus of Visit: Insulin Pump, CGM  Diabetes type: Type 2  Disease course: Getting harder to manage  How confident are you filling out medical forms by yourself:: Extremely  Transportation concerns: No  Other concerns:: None  Cultural Influences/Ethnic Background:  American    Time limited- as patient was 15 minutes late for appt.    Patient seen today for Insulin Pump Pre-Start:  Information packet(s) provided for the following pump(s): Medtronic 630G, Tandem t:slim X2, Omnipod  Patient is knowledgeable in the following insulin pump concept(s): Balancing glucose and insulin, Carbohydrate counting, Basic button pushing  Insulin Pump Start Plan: Patient would benefit from additional diabetes education visits prior to insulin pump start.      Diabetes Medication(s)     Biguanides Sig    metFORMIN (GLUCOPHAGE-XR) 500 MG 24 hr tablet TAKE 2 TABLETS BY MOUTH TWICE DAILY WITH MEALS    Insulin Sig    HUMALOG KWIKPEN 100 UNIT/ML soln INJECT 35 UNITS SUB-Q THREE TIMES DAILY    insulin degludec (TRESIBA) 100 UNIT/ML pen Inject 55 Units Subcutaneous daily    insulin lispro (HUMALOG KWIKPEN) 100 UNIT/ML injection Inject 35 units sq tid.  Dispense 90-day supply or as allowed by insurance.    Incretin Mimetic Agents (GLP-1 Receptor Agonists) Sig    Semaglutide (OZEMPIC) 1 MG/DOSE SOPN Inject 1 mg Subcutaneous once a week          ASSESSMENT  Pt interested in insulin pump therapy, due to BG variability- getting more difficult to manage.  Has been using Corwin CGM- but did not have reader with him today.       INTERVENTION:   Diabetes knowledge and skills assessment:     Patient is knowledgeable in diabetes management concepts related to: Healthy Eating, Being Active, Monitoring, Taking Medication, Problem Solving, Reducing Risks and Healthy Coping    Patient needs further education on the following diabetes management concepts: Monitoring, Taking Medication and Problem Solving    Based on learning assessment above, most appropriate setting for further diabetes education would be: Individual setting    Education provided today on:  AADE Self-Care Behaviors:  Monitoring: CGM options  Taking Medication: pro/cons of different pumps  Problem Solving: high blood glucose - causes, signs/symptoms, treatment and prevention and low blood glucose - causes, signs/symptoms, treatment and prevention    Opportunities for ongoing education and support in diabetes-self management were discussed.    Pt verbalized understanding of concepts discussed and recommendations provided today.       Education Materials Provided:  Informational packets for Medtronic, Omnipod and Tandem insulin pumps    Thinking About Using an Insulin Pump? handout      PLAN  See Patient Instructions for co-developed, patient-stated behavior change goals.  Pt is interested in the Dexcom G6 along with Tandem pump.    Will route pended rx for Dexcom G6 to Jasmin Arteaag NP for signature if agreeable with plan.  Patient has also filled out patient information form/ AOB - will fax toTandem.    CDE follow up 2 weeks.  AVS printed and provided to patient today. See Follow-Up section for recommended follow-up.    June Walker RD, CDE  Diabetes     Time Spent: 45 minutes  Encounter Type: Individual    Any diabetes medication dose changes were made via the CDE Protocol and Collaborative Practice Agreement with the patient's endocrinology provider. A copy of this encounter was shared with  the provider.

## 2018-11-09 NOTE — MR AVS SNAPSHOT
After Visit Summary   11/9/2018    Crispin Rojas    MRN: [id number]           Patient Information     Date Of Birth          1960        Visit Information        Provider Department      11/9/2018 10:30 AM June Walker RD [facility]        Care Instructions    1. Count carbohydrates in grams for all meals and snacks    2. Keep food log with grams of carbohydrate for all meals and snacks    3. Check blood sugars 4-6 times a day.     4. Keep glucose and insulin log until next pre-pump visit.    June Walker RD, LD, CDE  Diabetes     [facility]:  For Your Diabetes Education or Nutrition Appointments Call:  [phone]   For Diabetes Education or Nutrition Related Questions Call:   Phone: [phone]  E-mail: [email or web address]  Fax: [phone]   If you need a medication refill please contact your pharmacy. Please allow 3 business days for your refills to be completed.              Follow-ups after your visit        Your next 10 appointments already scheduled     Nov 21, 2018 12:30 PM CST   Diabetes Education with June Walker RD   [facility] ([facility])    [address] [facility] [address]   [phone]            Nov 23, 2018 12:30 PM CST   Return Sleep Patient with David Evans MD   [facility] ([facility])    [address] [place] [address]   [phone]            Dec 21, 2018  8:00 AM CST   Nurse Only with Juan M Oshea   [facility]    [address] [facility] [address]   [phone]            Anthony 10, 2019  2:00 PM CST   Return Visit with SARAH Raymundo CNP   [facility]    [address] [facility]  [id number] [phone]              Who to contact     If you have questions or need follow up information about today's clinic visit or your schedule please contact [facility] directly at [phone].  Normal or non-critical lab and imaging results will be communicated to you by MyChart, letter or phone within 4 business days after the clinic has received the results. If you do not hear from us within 7 days, please contact the clinic through [employer] or phone. If you have a critical or abnormal lab result, we will notify you by phone as soon as possible.  Submit refill requests through [employer] or call your pharmacy and they will forward the refill request to us. Please allow 3 business days for your refill to be completed.          Additional Information About Your Visit        [email or web address] Information     [employer] gives you secure access to your electronic health record. If you see a primary care provider, you can also send messages to your care team and make appointments. If you have questions, please call your primary care clinic.  If you do not have a primary care provider, please call [phone] and they will assist you.        Care EveryWhere ID     This is your Care EveryWhere ID. This could be used by other organizations to access your [facility] medical records  [id number]         Blood Pressure from Last 3 Encounters:   10/11/18 125/63   07/16/18 141/78   06/23/18 130/80    Weight from Last 3 Encounters:   11/09/18 111.2 kg (245 lb 1.6 oz)   10/11/18 108.6 kg (239 lb 8 oz)   10/10/18 109.4 kg (241 lb 1.6 oz)              Today, you had the following     No orders found for display       Primary Care Provider Office Phone # Fax #    Aden Lopez [phone] [address]  [facility] [address]        Equal Access to Services     ELI MARTÍNEZ : Nehemias Kan, washelbi martin, qaybta jeff hussein, blanca muhammad  "constantin rhodes. So [facility] [phone].    ATENCIÓN: Si sony poe, tiene a gomez disposición servicios gratuitos de asistencia lingüística. Michela al [phone].    We comply with applicable federal civil rights laws and [place] laws. We do not discriminate on the basis of race, color, national origin, age, disability, sex, sexual orientation, or gender identity.            Thank you!     Thank you for choosing [facility]  for your care. Our goal is always to provide you with excellent care. Hearing back from our patients is one way we can continue to improve our services. Please take a few minutes to complete the written survey that you may receive in the mail after your visit with us. Thank you!             Your Updated Medication List - Protect others around you: Learn how to safely use, store and throw away your medicines at www.disposemymeds.org.          This list is accurate as of 11/9/18 11:27 AM.  Always use your most recent med list.                   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 Use one syringe  daily or as directed.    Type 2 diabetes, HbA1C goal < 8% (H)       blood glucose lancets standard    no brand specified    300 each    Use to test blood sugar 3 times daily or as directed.    Type 2 diabetes mellitus with diabetic neuropathy, with long-term current use of insulin (H)       blood glucose monitoring meter device kit    no brand specified    1 kit    Use to test blood sugar 3 times daily or as " directed.    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)       * CIALIS 5 MG tablet   Generic drug:  tadalafil     30 tablet    TAKE 1 TABLET BY MOUTH EVERY DAY AS NEEDED    Erectile dysfunction due to diseases classified elsewhere       * CIALIS 5 MG tablet   Generic drug:  tadalafil     30 tablet    TAKE 1 TABLET BY MOUTH EVERY DAY AS NEEDED    Erectile dysfunction due to diseases classified elsewhere       * tadalafil 5 MG tablet    CIALIS    30 tablet    TAKE 1 TABLET BY MOUTH EVERY DAY AS NEEDED    Erectile dysfunction due to diseases classified elsewhere       Co-Enzyme Q10 100 MG Caps      Take 100 mg by mouth daily        continuous blood glucose monitoring sensor     4 each    For use with Freestyle Corwin Flash  for continuous monitioring of blood glucose levels. Replace sensor every 7-10 days.    Type 2 diabetes mellitus with diabetic peripheral angiopathy and gangrene, with long-term current use of insulin (H)       FREESTYLE CORWIN READER Iman     1 Device    1 Device as needed    Type 2 diabetes mellitus with diabetic peripheral angiopathy and gangrene, with long-term current use of insulin (H)       gabapentin 100 MG capsule    NEURONTIN     Take 300 mg by mouth At Bedtime        insulin degludec 100 UNIT/ML pen    TRESIBA    30 mL    Inject 55 Units Subcutaneous daily    Type 2 diabetes mellitus with diabetic peripheral angiopathy and gangrene, with long-term current use of insulin (H)       * insulin lispro 100 UNIT/ML injection    HumaLOG KWIKpen    30 mL    Inject 35 units sq tid.  Dispense 90-day supply or as allowed by insurance.    Type 2 diabetes mellitus with peripheral neuropathy (H)       * HumaLOG KWIKpen 100 UNIT/ML injection   Generic drug:  insulin lispro     30 mL     INJECT 35 UNITS SUB-Q THREE TIMES DAILY    Type 2 diabetes mellitus with peripheral neuropathy (H)       ketoconazole 2 % shampoo    NIZORAL    120 mL    APPLY TO THE AFFECTED AREA AND WASH OFF AFTER 5 MINUTES.    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)       MULTIVITAMIN PO      Take 1 tablet by mouth daily        OMEGA-3 FISH OIL PO      Take 2 g by mouth daily        Pen Needles 31G X 5 MM Misc     200 each    1 Device 5 times daily , TWICE DAILY WITH LANTUS AND 3 TIMES A DAY PRN WITH NOVOLOG FLEXPEN    Type 2 diabetes mellitus with peripheral neuropathy (H)       Semaglutide 1 MG/DOSE Sopn    OZEMPIC    1 pen    Inject 1 mg Subcutaneous once a week    Type 2 diabetes mellitus with peripheral neuropathy (H)       VITAMIN D3 PO      Take 1,000 Units by mouth daily        * Notice:  This list has 5 medication(s) that are the same as other medications prescribed for you. Read the directions carefully, and ask your doctor or other care provider to review them with you.

## 2018-11-09 NOTE — MR AVS SNAPSHOT
After Visit Summary   11/9/2018    Crispin Rojas    MRN: [id number]           Patient Information     Date Of Birth          1960        Visit Information        Provider Department      11/9/2018 8:00 AM Juan M Oshea [facility]        Care Instructions      November 9, 2018    [facility]  [address] [address] [facility] [address]  [phone]  [phone]  Health Coaching Progress Note    Patient Name: Crispin Rojas Date: November 9, 2018     Plan/Goal for the Next 4 Weeks:   GOAL #1: Be more aware of what you're eating at meals and snacks/work on reducing carbs, eat more fresh food items and add in more protein each day  GOAL #2: Keep working on sleep schedule/try to get close to 8 hours each night as able  GOAL #3: Schedule/attend annual physical with Dr. Lopez-New Goal  GOAL #4: Follow-up with Sleep study results as scheduled-New Goal  GOAL #5: Start regular workout routine at the gym after job is figured out and shoot for 4-5 days per week-New Goal      Plan: (Homework, other):  Patient was encouraged to continue to seek condition-related information and education, as well as schedule a follow up appointment with the Health  in 5 weeks. Patient has set self-identified goals and will monitor progress until the next appointment.  Scheduled our next coaching appointment for Friday December 21st at 8am.  Juan M Oshea       Resources:                Follow-ups after your visit        Your next 10 appointments already scheduled     Nov 09, 2018 10:30 AM CST   Diabetes Education with June Walker RD   [facility]    [address] [address]   [phone]            Nov 23, 2018 12:30 PM CST   Return Sleep Patient with David Evans MD   [facility]    [address]  [address] [address]   [phone]            Dec 21, 2018  8:00 AM CST   Nurse Only with Juan M Oshea   [facility]    [address] [facility] [address] [phone]            Anthony 10, 2019  2:00 PM CST   Return Visit with SARAH Raymudno CNP   [facility]    [address] [facility] [address] [phone]              Who to contact     If you have questions or need follow up information about today's clinic visit or your schedule please contact [facility] directly at [phone].  Normal or non-critical lab and imaging results will be communicated to you by MyChart, letter or phone within 4 business days after the clinic has received the results. If you do not hear from us within 7 days, please contact the clinic through MyChart or phone. If you have a critical or abnormal lab result, we will notify you by phone as soon as possible.  Submit refill requests through [employer] or call your pharmacy and they will forward the refill request to us. Please allow 3 business days for your refill to be completed.          Additional Information About Your Visit        [facility] Information     [employer] gives you secure access to your electronic health record. If you see a primary care provider, you can also send messages to your care team and make appointments. If you have questions, please call your primary care clinic.  If you do not have a primary care provider, please call [phone] and they will assist you.        Care EveryWhere ID     This is your Care EveryWhere ID. This could be used by other organizations to access your [facility] medical records  [id number]        Your Vitals Were     BMI (Body Mass Index)                   34.57 kg/m2            Blood Pressure from Last 3 Encounters:   10/11/18 125/63   07/16/18 141/78   06/23/18  "130/80    Weight from Last 3 Encounters:   11/09/18 245 lb 1.6 oz (111.2 kg)   10/11/18 239 lb 8 oz (108.6 kg)   10/10/18 241 lb 1.6 oz (109.4 kg)              Today, you had the following     No orders found for display       Primary Care Provider Office Phone # Fax #    Aden Ethan Lopez [phone] [address] [facility] [address]        Equal Access to Services     ELI MARTÍNEZ : Hadii aad ku hadasho Soomaali, waaxda luqadaha, qaybta kaalmada adeegyada, waxay idiin hayaan adeeg kharash dean . So [facility] [phone].    ATENCIÓN: Si habla español, tiene a gomez disposición servicios gratuitos de asistencia lingüística. [place] [phone].    We comply with applicable federal civil rights laws and [place] laws. We do not discriminate on the basis of race, color, national origin, age, disability, sex, sexual orientation, or gender identity.            Thank you!     Thank you for choosing [facility]  for your care. Our goal is always to provide you with excellent care. Hearing back from our patients is one way we can continue to improve our services. Please take a few minutes to complete the written survey that you may receive in the mail after your visit with us. Thank you!             Your Updated Medication List - Protect others around you: Learn how to safely use, store and throw away your medicines at www.disposemymeds.org.          This list is accurate as of 11/9/18  8:34 AM.  Always use your most recent med list.                   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 Use one syringe  daily or as directed.    Type 2 diabetes, HbA1C goal < 8% (H)       blood glucose lancets standard    no brand specified    300 each    Use to test blood sugar 3 times daily or as directed.    Type 2 diabetes mellitus with diabetic neuropathy, with long-term current use of insulin (H)       blood glucose monitoring meter device kit    no brand specified    1 kit    Use to test blood sugar 3 times daily or as directed.    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)       * CIALIS 5 MG tablet   Generic drug:  tadalafil     30 tablet    TAKE 1 TABLET BY MOUTH EVERY DAY AS NEEDED    Erectile dysfunction due to diseases classified elsewhere       * CIALIS 5 MG tablet   Generic drug:  tadalafil     30 tablet    TAKE 1 TABLET BY MOUTH EVERY DAY AS NEEDED    Erectile dysfunction due to diseases classified elsewhere       * tadalafil 5 MG tablet    CIALIS    30 tablet    TAKE 1 TABLET BY MOUTH EVERY DAY AS NEEDED    Erectile dysfunction due to diseases classified elsewhere       Co-Enzyme Q10 100 MG Caps      Take 100 mg by mouth daily        continuous blood glucose monitoring sensor     4 each    For use with Freestyle Corwin Flash  for continuous monitioring of blood glucose levels. Replace sensor every 7-10 days.    Type 2 diabetes mellitus with diabetic peripheral angiopathy and gangrene, with long-term current use of insulin (H)       FREESTYLE CORWIN READER Iman     1 Device    1 Device as needed    Type 2 diabetes mellitus with diabetic peripheral angiopathy and gangrene, with long-term current use of insulin (H)       gabapentin 100 MG capsule    NEURONTIN     Take 300 mg by mouth At Bedtime         insulin degludec 100 UNIT/ML pen    TRESIBA    30 mL    Inject 55 Units Subcutaneous daily    Type 2 diabetes mellitus with diabetic peripheral angiopathy and gangrene, with long-term current use of insulin (H)       * insulin lispro 100 UNIT/ML injection    HumaLOG KWIKpen    30 mL    Inject 35 units sq tid.  Dispense 90-day supply or as allowed by insurance.    Type 2 diabetes mellitus with peripheral neuropathy (H)       * HumaLOG KWIKpen 100 UNIT/ML injection   Generic drug:  insulin lispro     30 mL    INJECT 35 UNITS SUB-Q THREE TIMES DAILY    Type 2 diabetes mellitus with peripheral neuropathy (H)       ketoconazole 2 % shampoo    NIZORAL    120 mL    APPLY TO THE AFFECTED AREA AND WASH OFF AFTER 5 MINUTES.    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)       MULTIVITAMIN PO      Take 1 tablet by mouth daily        OMEGA-3 FISH OIL PO      Take 2 g by mouth daily        Pen Needles 31G X 5 MM Misc     200 each    1 Device 5 times daily , TWICE DAILY WITH LANTUS AND 3 TIMES A DAY PRN WITH NOVOLOG FLEXPEN    Type 2 diabetes mellitus with peripheral neuropathy (H)       Semaglutide 1 MG/DOSE Sopn    OZEMPIC    1 pen    Inject 1 mg Subcutaneous once a week    Type 2 diabetes mellitus with peripheral neuropathy (H)       VITAMIN D3 PO      Take 1,000 Units by mouth daily        * Notice:  This list has 5 medication(s) that are the same as other medications prescribed for you. Read the directions carefully, and ask your doctor or other care provider to review them with you.

## 2018-11-12 RX ORDER — PROCHLORPERAZINE 25 MG/1
1 SUPPOSITORY RECTAL PRN
Qty: 1 EACH | Refills: 13 | Status: SHIPPED | OUTPATIENT
Start: 2018-11-12 | End: 2019-03-29

## 2018-11-12 RX ORDER — PROCHLORPERAZINE 25 MG/1
1 SUPPOSITORY RECTAL PRN
Qty: 1 DEVICE | Refills: 0 | Status: SHIPPED | OUTPATIENT
Start: 2018-11-12 | End: 2019-03-29

## 2018-11-12 RX ORDER — PROCHLORPERAZINE 25 MG/1
1 SUPPOSITORY RECTAL
Qty: 1 EACH | Refills: 4 | Status: SHIPPED | OUTPATIENT
Start: 2018-11-12 | End: 2019-03-29

## 2018-11-21 ENCOUNTER — ALLIED HEALTH/NURSE VISIT (OUTPATIENT)
Dept: EDUCATION SERVICES | Facility: CLINIC | Age: 58
End: 2018-11-21
Payer: COMMERCIAL

## 2018-11-21 DIAGNOSIS — E11.52 TYPE 2 DIABETES MELLITUS WITH DIABETIC PERIPHERAL ANGIOPATHY AND GANGRENE, WITH LONG-TERM CURRENT USE OF INSULIN (H): ICD-10-CM

## 2018-11-21 DIAGNOSIS — Z79.4 TYPE 2 DIABETES MELLITUS WITH DIABETIC PERIPHERAL ANGIOPATHY AND GANGRENE, WITH LONG-TERM CURRENT USE OF INSULIN (H): ICD-10-CM

## 2018-11-21 DIAGNOSIS — E11.42 TYPE 2 DIABETES MELLITUS WITH PERIPHERAL NEUROPATHY (H): Primary | ICD-10-CM

## 2018-11-21 PROCEDURE — G0108 DIAB MANAGE TRN  PER INDIV: HCPCS | Performed by: DIETITIAN, REGISTERED

## 2018-11-21 NOTE — Clinical Note
yissel Dickinson- pt planning to move forward with [employer] pump + Dexcom.  Thanks! June Walekr RD, CDE Diabetes

## 2018-11-21 NOTE — PATIENT INSTRUCTIONS
1. Please follow up with [facility] regarding the Dexcom G6-   phone number:  [phone]    2. Contact [employer] to move forward with obtaining the T:Slim Basal IQ pump.   phone number: [phone]    3. No changes in insulin doses at this time.     4. Please take photo of your food and insulin log, and send to the following e-mail address to [email or web address]  (attn: [email or web address] Walker)    5. Please skip your Tresiba insulin the morning of the pump start.     6. Bring a vial of Humalog insulin (we will send a prescription), along with your pump and Dexcom supplies. You can start your Dexcom before the pump training appointment.     June Walker RD, LD, CDE  Diabetes      Bring blood glucose meter and logbook with you to all doctor and follow-up appointments.     [facility]:  For Your Diabetes Education and Nutrition Appointments Call:  [phone]   For Diabetes Education or Nutrition Related Questions:   Phone: [phone]  E-mail: [email or web address]  Fax: [phone]   If you need a medication refill please contact your pharmacy. Please allow 3 business days for your refills to be completed.    Instructions for emailing the Diabetes Educators    If you need to communicate a non-urgent message to a Diabetes Educator via email, please send to [email or web address].    Please follow the following email guidelines:    Subject line: Secure: your clinic name (example: Secure: [facility])  In the email please include: First name, middle initial, last name and date of birth.    We will be in touch with you within one (1) business day.

## 2018-11-21 NOTE — LETTER
11/21/2018         RE: Crispin Rojas  [address] [address]        Dear Colleague,    Thank you for referring your patient, Crispin Rojas, to the [facility]. Please see a copy of my visit note below.    Diabetes Self-Management Education & Support - Insulin Pre-Pump Education    SUBJECTIVE/OBJECTIVE  Diabetes education in the past 24mo: Yes  Diabetes type: Type 2  Disease course: Stable  Cultural Influences/Ethnic Background:  American    Patient comments: pt with several questions about insulin pump use- reports that he has been approved by insurance, pt concerned about his blood sugars, thinks he might need to increase his Tresiba dose    Healthy Eating  Cultural/Restoration diet restrictions?: No  Meal planning: None  Meals include: Other  Beverages: Water, Tea  Has patient met with a dietitian in the past?: No    Being Active  Barrier to exercise: None    Monitoring  Pt monitors BG as follows: uses personal Corwin continuous glucose monitor          Statistics:       Data evaluation:    Ambulatory Glucose Profile (AGP) shows the following: reports incomplete             Taking Medications  Diabetes Medication(s)     Biguanides Sig    metFORMIN (GLUCOPHAGE-XR) 500 MG 24 hr tablet TAKE 2 TABLETS BY MOUTH TWICE DAILY WITH MEALS    Insulin Sig    HUMALOG KWIKPEN 100 UNIT/ML soln INJECT 35 UNITS SUB-Q THREE TIMES DAILY - current TDD Humalog is 75  Units (uses 9 units per 20-25 gms of carbs, increases dose to 35 units if BG >200 mg/dl)    insulin degludec (TRESIBA) 100 UNIT/ML pen Inject 55 Units Subcutaneous daily - currently taking 44 units    insulin lispro (HUMALOG KWIKPEN) 100 UNIT/ML injection Inject 35 units sq tid.  Dispense 90-day supply or as allowed by insurance.    Incretin Mimetic Agents (GLP-1 Receptor Agonists) Sig    Semaglutide (OZEMPIC) 1 MG/DOSE SOPN Inject 1 mg Subcutaneous once a week        Current Treatments: Insulin Injections,  Non-insulin Injectables, Oral Agent (dual therapy)    Problem Solving  Hypoglycemia Frequency: Weekly  Hypoglycemia Treatment: Other food  Patient carries a carbohydrate source: No  Medical alert: No  Severe weather/disaster plan for diabetes management?: No  DKA prevention plan?: No  Sick day plan for diabetes management?: (P) Yes    Hypoglycemia symptoms  Confusion: No  Dizziness or Light-Headedness: No  Headaches: No  Hunger: No  Mood changes: No  Nervousness/Anxiety: No  Sleepiness: No  Speech difficulty: No  Sweats: No  Tremors: Yes    Hypoglycemia Complications  Blackouts: No  Hospitalization: No  Nocturnal hypoglycemia: No  Required assistance: No  Required glucagon injection: No  Seizures: No    Reducing Risks  Has dilated eye exam at least once a year?: Yes  Sees dentist every 6 months?: Yes  Sees podiatrist (foot doctor)?: Yes    Healthy Coping  Informal Support system:: None  Difficulty affording diabetes management supplies?: No  Patient Activation Measure Survey Score:  ROCHELLE Score (Last Two) 6/21/2018   ROCHELLE Raw Score 30   Activation Score 56   ROCHELLE Level 3         INTERVENTION:   Diabetes knowledge and skills assessment:     Patient is knowledgeable in diabetes management concepts related to: Healthy Eating, Being Active, Monitoring, Taking Medication, Problem Solving, Reducing Risks and Healthy Coping    Patient needs further education on the following diabetes management concepts: Monitoring, Taking Medication and Problem Solving    Based on learning assessment above, most appropriate setting for further diabetes education would be: Individual setting    Education provided today on:  AADE Self-Care Behaviors:  Monitoring: CGM/ pump integration  Taking Medication: insulin action and pattern mgmt  Problem Solving: high blood glucose - causes, signs/symptoms, treatment and prevention    Insulin Pump Concepts- Balancing glucose and insulin, Carbohydrate counting, Calculating boluses, Problem solving with  "insulin pump therapy (BG monitoring; hypoglycemia signs/symptoms, treatment (glucagon) and prevention; hyperglycemia signs/symptoms, treatment and prevention; ketones, DKA signs/symptoms and prevention)    Opportunities for ongoing education and support in diabetes-self management were discussed.    Pt verbalized understanding of concepts discussed and recommendations provided today.       Education Materials Provided:  No new materials provided today      ASSESSMENT  Pt is in the process of obtaining the Tandem t:slim X2 with Dexcom G6  Patient is knowledgeable in the following insulin pump concept(s): Balancing glucose and insulin, Carbohydrate counting, Calculating boluses, Managing insulin pump therapy, Basic button pushing    Per review of CGM data- discussed current BG variability and therefore would not increase Tresiba at this time- hyperglycemia is typically post-meal which often carries over-night. Reinforced importance of accurate carb counting and I:C dosing- \"avoid guessing the dose\". Pt will benefit from use of an insulin pump bolus calculator  For more precise dosing, along with Dexcom for low BG alerts.     Patient has completed pre-pump education. Will need to follow up with [facility] and [facility] (for Dexcom) to proceed. Pump start tentatively scheduled for 1/14/19.    PLAN  See Patient Instructions for co-developed, patient-stated behavior change goals.  AVS printed and provided to patient today. See Follow-Up section for recommended follow-up.    June Walker RD, CDE  Diabetes     Time Spent: 60 minutes  Encounter Type: Individual    Any diabetes medication dose changes were made via the CDE Protocol and Collaborative Practice Agreement with the patient's endocrinology provider. A copy of this encounter was shared with the provider.        "

## 2018-11-21 NOTE — MR AVS SNAPSHOT
After Visit Summary   11/21/2018    Crispin Rojas    MRN: [id number]           Patient Information     Date Of Birth          1960        Visit Information        Provider Department      11/21/2018 12:30 PM June Walker RD [facility]        Care Instructions    1. Please follow up with [facility] regarding the Dexcom G6-   phone number:  [phone]    2. Contact [facility] to move forward with obtaining the T:Slim Basal IQ pump.   phone number: [phone]    3. No changes in insulin doses at this time.     4. Please take photo of your food and insulin log, and send to the following e-mail address to [email or web address]  (attn: [email or web address])    5. Please skip your Tresiba insulin the morning of the pump start.     6. Bring a vial of Humalog insulin (we will send a prescription), along with your pump and Dexcom supplies. You can start your Dexcom before the pump training appointment.     June Walker RD, LD, CDE  Diabetes      Bring blood glucose meter and logbook with you to all doctor and follow-up appointments.     [facility] for the [facility]:  For Your Diabetes Education and Nutrition Appointments Call:  [phone]   For Diabetes Education or Nutrition Related Questions:   Phone: [phone]  E-mail: [email or web address]  Fax: [phone]   If you need a medication refill please contact your pharmacy. Please allow 3 business days for your refills to be completed.    Instructions for emailing the Diabetes Educators    If you need to communicate a non-urgent message to a Diabetes Educator via email, please send to [email or web address].    Please follow the following email guidelines:    Subject line: Secure: your clinic name (example: Secure: David)  In the email please include: First name, middle initial, last name and date of birth.    We will be in touch with you within  one (1) business day.             Follow-ups after your visit        Your next 10 appointments already scheduled     Nov 23, 2018 12:30 PM CST   Return Sleep Patient with David Evans MD   [facility] ([facility])    [address] [address]   [phone]            Dec 21, 2018  8:00 AM CST   Nurse Only with Juan M Oshea   [facility] ([facility])    [address] [facility] [address] [phone]            Anthony 10, 2019  2:00 PM CST   Return Visit with SARAH Raymundo CNP   [facility] ([facility])    [id number] [place] [address]. [facility] [address] [phone]            Jan 14, 2019  8:30 AM CST   Diabetes Education with June Walker RD   [facility] ([facility])    [address] [facility] [address] [phone]              Who to contact     If you have questions or need follow up information about today's clinic visit or your schedule please contact [facility] directly at [phone].  Normal or non-critical lab and imaging results will be communicated to you by SmartPay Jieyinhart, letter or phone within 4 business days after the clinic has received the results. If you do not hear from us within 7 days, please contact the clinic through SmartPay Jieyinhart or phone. If you have a critical or abnormal lab result, we will notify you by phone as soon as possible.  Submit refill requests through [email or web address] or call your pharmacy and they will forward the refill request to us. Please allow 3 business days for your refill to be completed.          Additional Information About Your Visit        [email or web address] Information     [email or web address] gives you secure access to your electronic health record. If you see a primary care provider, you can also send messages to your care team and make  appointments. If you have questions, please call your primary care clinic.  If you do not have a primary care provider, please call [phone] and they will assist you.        Care EveryWhere ID     This is your Care EveryWhere ID. This could be used by other organizations to access your [place] medical records  [id number]         Blood Pressure from Last 3 Encounters:   10/11/18 125/63   07/16/18 141/78   06/23/18 130/80    Weight from Last 3 Encounters:   11/09/18 111.2 kg (245 lb 1.6 oz)   10/11/18 108.6 kg (239 lb 8 oz)   10/10/18 109.4 kg (241 lb 1.6 oz)              Today, you had the following     No orders found for display       Primary Care Provider Office Phone # Fax #    Aden Lopez [phone] [address] [facility] [address]        Equal Access to Services     YE MARTÍNEZ : Hadii geraldine pisanoo Socas, waaxda luqadaha, qaybta kaalmada adeegyada, blanca moran . So [facility] [phone].    ATENCIÓN: Si kaciela español, tiene a gomez disposición servicios gratuitos de asistencia lingüística. Llame al [phone].    We comply with applicable federal civil rights laws and [place] laws. We do not discriminate on the basis of race, color, national origin, age, disability, sex, sexual orientation, or gender identity.            Thank you!     Thank you for choosing [facility]  for your care. Our goal is always to provide you with excellent care. Hearing back from our patients is one way we can continue to improve our services. Please take a few minutes to complete the written survey that you may receive in the mail after your visit with us. Thank you!             Your Updated Medication List - Protect others around you: Learn how to safely use, store and throw away your medicines at www.disposemymeds.org.          This list is accurate as of 11/21/18  1:47 PM.  Always use your most recent med list.              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 Use one syringe  daily or as directed.    Type 2 diabetes, HbA1C goal < 8% (H)       blood glucose lancets standard    no brand specified    300 each    Use to test blood sugar 3 times daily or as directed.    Type 2 diabetes mellitus with diabetic neuropathy, with long-term current use of insulin (H)       blood glucose monitoring meter device kit    no brand specified    1 kit    Use to test blood sugar 3 times daily or as directed.    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)       * CIALIS 5 MG tablet   Generic drug:  tadalafil     30 tablet    TAKE 1 TABLET BY MOUTH EVERY DAY AS NEEDED    Erectile dysfunction due to diseases classified elsewhere       * CIALIS 5 MG tablet   Generic drug:  tadalafil     30 tablet    TAKE 1 TABLET BY MOUTH EVERY DAY AS NEEDED    Erectile dysfunction due to diseases classified elsewhere       * tadalafil 5 MG tablet    CIALIS    30 tablet    TAKE 1 TABLET BY MOUTH EVERY DAY AS NEEDED    Erectile dysfunction due to diseases classified elsewhere       Co-Enzyme Q10 100 MG Caps      Take 100 mg by mouth daily        * continuous blood glucose monitoring sensor     4 each    For use with Freestyle Corwin Flash  for continuous monitioring of blood " glucose levels. Replace sensor every 7-10 days.    Type 2 diabetes mellitus with diabetic peripheral angiopathy and gangrene, with long-term current use of insulin (H)       * DEXCOM G6 SENSOR Misc     1 each    1 each as needed (Use one sensor every 10 days)    Type 2 diabetes mellitus with peripheral neuropathy (H)       DEXCOM G6 TRANSMITTER Misc     1 each    1 Device every 3 months    Type 2 diabetes mellitus with peripheral neuropathy (H)       * FREESTYLE LUCERO READER Iman     1 Device    1 Device as needed    Type 2 diabetes mellitus with diabetic peripheral angiopathy and gangrene, with long-term current use of insulin (H)       * DEXCOM G6  Iman     1 Device    1 each as needed (use to receive continous blood glucose data)    Type 2 diabetes mellitus with peripheral neuropathy (H)       gabapentin 100 MG capsule    NEURONTIN     Take 300 mg by mouth At Bedtime        insulin degludec 100 UNIT/ML pen    TRESIBA    30 mL    Inject 55 Units Subcutaneous daily    Type 2 diabetes mellitus with diabetic peripheral angiopathy and gangrene, with long-term current use of insulin (H)       * insulin lispro 100 UNIT/ML injection    HumaLOG KWIKpen    30 mL    Inject 35 units sq tid.  Dispense 90-day supply or as allowed by insurance.    Type 2 diabetes mellitus with peripheral neuropathy (H)       * HumaLOG KWIKpen 100 UNIT/ML injection   Generic drug:  insulin lispro     30 mL    INJECT 35 UNITS SUB-Q THREE TIMES DAILY    Type 2 diabetes mellitus with peripheral neuropathy (H)       ketoconazole 2 % shampoo    NIZORAL    120 mL    APPLY TO THE AFFECTED AREA AND WASH OFF AFTER 5 MINUTES.    Seborrheic dermatitis       lisinopril 20 MG tablet    PRINIVIL/ZESTRIL    90 tablet    Take 1 tablet (20 mg) by mouth daily    Essential hypertension       metFORMIN 500 MG 24 hr tablet    GLUCOPHAGE-XR    360 tablet    TAKE 2 TABLETS BY MOUTH TWICE DAILY WITH MEALS    Diabetes mellitus type 2 with neurological  manifestations (H)       MULTIVITAMIN PO      Take 1 tablet by mouth daily        OMEGA-3 FISH OIL PO      Take 2 g by mouth daily        Pen Needles 31G X 5 MM Misc     200 each    1 Device 5 times daily , TWICE DAILY WITH LANTUS AND 3 TIMES A DAY PRN WITH NOVOLOG FLEXPEN    Type 2 diabetes mellitus with peripheral neuropathy (H)       semaglutide 1 MG/DOSE pen    OZEMPIC    1 pen    Inject 1 mg Subcutaneous once a week    Type 2 diabetes mellitus with peripheral neuropathy (H)       VITAMIN D3 PO      Take 1,000 Units by mouth daily        * Notice:  This list has 9 medication(s) that are the same as other medications prescribed for you. Read the directions carefully, and ask your doctor or other care provider to review them with you.

## 2018-11-21 NOTE — PROGRESS NOTES
Diabetes Self-Management Education & Support - Insulin Pre-Pump Education    SUBJECTIVE/OBJECTIVE  Diabetes education in the past 24mo: Yes  Diabetes type: Type 2  Disease course: Stable  Cultural Influences/Ethnic Background:  American    Patient comments: pt with several questions about insulin pump use- reports that he has been approved by insurance, pt concerned about his blood sugars, thinks he might need to increase his Tresiba dose    Healthy Eating  Cultural/Yazidi diet restrictions?: No  Meal planning: None  Meals include: Other  Beverages: Water, Tea  Has patient met with a dietitian in the past?: No    Being Active  Barrier to exercise: None    Monitoring  Pt monitors BG as follows: uses personal Corwin continuous glucose monitor          Statistics:       Data evaluation:    Ambulatory Glucose Profile (AGP) shows the following: reports incomplete             Taking Medications  Diabetes Medication(s)     Biguanides Sig    metFORMIN (GLUCOPHAGE-XR) 500 MG 24 hr tablet TAKE 2 TABLETS BY MOUTH TWICE DAILY WITH MEALS    Insulin Sig    HUMALOG KWIKPEN 100 UNIT/ML soln INJECT 35 UNITS SUB-Q THREE TIMES DAILY - current TDD Humalog is 75  Units (uses 9 units per 20-25 gms of carbs, increases dose to 35 units if BG >200 mg/dl)    insulin degludec (TRESIBA) 100 UNIT/ML pen Inject 55 Units Subcutaneous daily - currently taking 44 units    insulin lispro (HUMALOG KWIKPEN) 100 UNIT/ML injection Inject 35 units sq tid.  Dispense 90-day supply or as allowed by insurance.    Incretin Mimetic Agents (GLP-1 Receptor Agonists) Sig    Semaglutide (OZEMPIC) 1 MG/DOSE SOPN Inject 1 mg Subcutaneous once a week        Current Treatments: Insulin Injections, Non-insulin Injectables, Oral Agent (dual therapy)    Problem Solving  Hypoglycemia Frequency: Weekly  Hypoglycemia Treatment: Other food  Patient carries a carbohydrate source: No  Medical alert: No  Severe weather/disaster plan for diabetes management?: No  DKA  prevention plan?: No  Sick day plan for diabetes management?: (P) Yes    Hypoglycemia symptoms  Confusion: No  Dizziness or Light-Headedness: No  Headaches: No  Hunger: No  Mood changes: No  Nervousness/Anxiety: No  Sleepiness: No  Speech difficulty: No  Sweats: No  Tremors: Yes    Hypoglycemia Complications  Blackouts: No  Hospitalization: No  Nocturnal hypoglycemia: No  Required assistance: No  Required glucagon injection: No  Seizures: No    Reducing Risks  Has dilated eye exam at least once a year?: Yes  Sees dentist every 6 months?: Yes  Sees podiatrist (foot doctor)?: Yes    Healthy Coping  Informal Support system:: None  Difficulty affording diabetes management supplies?: No  Patient Activation Measure Survey Score:  ROCHELLE Score (Last Two) 6/21/2018   ROCHELLE Raw Score 30   Activation Score 56   ROCHELLE Level 3         INTERVENTION:   Diabetes knowledge and skills assessment:     Patient is knowledgeable in diabetes management concepts related to: Healthy Eating, Being Active, Monitoring, Taking Medication, Problem Solving, Reducing Risks and Healthy Coping    Patient needs further education on the following diabetes management concepts: Monitoring, Taking Medication and Problem Solving    Based on learning assessment above, most appropriate setting for further diabetes education would be: Individual setting    Education provided today on:  AADE Self-Care Behaviors:  Monitoring: CGM/ pump integration  Taking Medication: insulin action and pattern mgmt  Problem Solving: high blood glucose - causes, signs/symptoms, treatment and prevention    Insulin Pump Concepts- Balancing glucose and insulin, Carbohydrate counting, Calculating boluses, Problem solving with insulin pump therapy (BG monitoring; hypoglycemia signs/symptoms, treatment (glucagon) and prevention; hyperglycemia signs/symptoms, treatment and prevention; ketones, DKA signs/symptoms and prevention)    Opportunities for ongoing education and support in  "diabetes-self management were discussed.    Pt verbalized understanding of concepts discussed and recommendations provided today.       Education Materials Provided:  No new materials provided today      ASSESSMENT  Pt is in the process of obtaining the [facility] t:slim X2 with Dexcom G6  Patient is knowledgeable in the following insulin pump concept(s): Balancing glucose and insulin, Carbohydrate counting, Calculating boluses, Managing insulin pump therapy, Basic button pushing    Per review of CGM data- discussed current BG variability and therefore would not increase Tresiba at this time- hyperglycemia is typically post-meal which often carries over-night. Reinforced importance of accurate carb counting and I:C dosing- \"avoid guessing the dose\". Pt will benefit from use of an insulin pump bolus calculator  For more precise dosing, along with Dexcom for low BG alerts.     Patient has completed pre-pump education. Will need to follow up with [facility] and [facility] (for Dexcom) to proceed. Pump start tentatively scheduled for 1/14/19.    PLAN  See Patient Instructions for co-developed, patient-stated behavior change goals.  AVS printed and provided to patient today. See Follow-Up section for recommended follow-up.    June Walker RD, CDE  Diabetes     Time Spent: 60 minutes  Encounter Type: Individual    Any diabetes medication dose changes were made via the CDE Protocol and Collaborative Practice Agreement with the patient's endocrinology provider. A copy of this encounter was shared with the provider.      "

## 2018-11-23 ENCOUNTER — OFFICE VISIT (OUTPATIENT)
Dept: SLEEP MEDICINE | Facility: CLINIC | Age: 58
End: 2018-11-23
Payer: COMMERCIAL

## 2018-11-23 VITALS
BODY MASS INDEX: 35.03 KG/M2 | SYSTOLIC BLOOD PRESSURE: 136 MMHG | HEIGHT: 71 IN | WEIGHT: 250.2 LBS | OXYGEN SATURATION: 95 % | DIASTOLIC BLOOD PRESSURE: 81 MMHG | RESPIRATION RATE: 16 BRPM | HEART RATE: 63 BPM

## 2018-11-23 DIAGNOSIS — G47.33 OSA (OBSTRUCTIVE SLEEP APNEA): Primary | ICD-10-CM

## 2018-11-23 PROCEDURE — 99213 OFFICE O/P EST LOW 20 MIN: CPT | Performed by: INTERNAL MEDICINE

## 2018-11-23 NOTE — PATIENT INSTRUCTIONS

## 2018-11-23 NOTE — NURSING NOTE
"Chief Complaint   Patient presents with     Study Results     HST f/u       Initial /81  Pulse 63  Resp 16  Ht 1.791 m (5' 10.5\")  Wt 113.5 kg (250 lb 3.2 oz)  SpO2 95%  BMI 35.39 kg/m2 Estimated body mass index is 35.39 kg/(m^2) as calculated from the following:    Height as of this encounter: 1.791 m (5' 10.5\").    Weight as of this encounter: 113.5 kg (250 lb 3.2 oz).    Medication Reconciliation: complete     ESS   Maria G Sosa        "

## 2018-11-23 NOTE — PROGRESS NOTES
Sleep Study Follow-Up Visit:    Date on this visit: 11/23/2018    Crispin Rojas comes in today for follow-up of his home sleep study done on 10/18/2018 at the [facility] [place] for possible sleep apnea.     Respiratory events: The home study revealed a presence of 110 obstructive apneas and 2 mixed and central apneas. There were 155 hypopneas resulting in a combined apnea/hypopnea index [AHI] of 34.1 events per hour.  AHI was 48.4 per hour supine, - per hour prone, 3.6 per hour on left side, and 3.1 per hour on right side.   Pattern: Excluding events noted above, respiratory rate and pattern was Normal.    Sleep Associated Hypoxemia: sustained hypoxemia was present. Baseline oxygen saturation was 90.4%.  Time with saturation less than or equal to 88% was 93.6 minutes. The lowest oxygen saturation was 72%.   Snoring: Snoring was present.     Position: Percent of time spent: supine - 68%, prone - 0%, on left - 7%, on right - 24.7%.     Heart Rate: By pulse oximetry normal rate was noted.      Assessment:   Severe obstructive sleep apnea.  Sleep associated hypoxemia was present.    These findings were reviewed with patient.     Past medical/surgical history, family history, social history, medications and allergies were reviewed.      Problem List:  Patient Active Problem List    Diagnosis Date Noted     GILA (obstructive sleep apnea) 10/22/2018     Priority: Medium     HST 10/18/2018, AHI : 34.1       Right bundle branch block 06/23/2018     Priority: Medium     On ECG 6/23/2018, advised to f/u with PCP       Hyperlipidemia LDL goal <100 06/07/2018     Priority: Medium     Benign essential hypertension 06/07/2018     Priority: Medium     Type 2 diabetes mellitus with peripheral neuropathy (H) 11/27/2017     Priority: Medium     Chronic left shoulder pain 01/30/2017     Priority: Medium     Cervicalgia 01/30/2017     Priority: Medium     Calculus of kidney 08/30/2016     Priority: Medium     Morbid  obesity, unspecified obesity type (H) 11/04/2015     Priority: Medium     Other specified transient cerebral ischemias 11/04/2015     Priority: Medium     CARDIOVASCULAR SCREENING; LDL GOAL LESS THAN 100 10/31/2010     Priority: Medium     Disorder of bursae and tendons in shoulder region 06/28/2004     Priority: Medium     Problem list name updated by automated process. Provider to review          Impression/Plan:    1. Severe obstructive sleep apnea    - Patient was counseled regarding severe sleep apnea, consequences of untreated disease and management. CPAP therapy is the treatment of choice. Patient was previously on CPAP and denies any significant problems tolerating therapy.     Plan:     1. Start auto PAP therapy     He will follow up with me in about 7 week(s).     Fifteen minutes spent with patient, all of which were spent face-to-face counseling, consulting, coordinating plan of care.      Dr. David Evans    CC: Aden Lopez

## 2018-11-23 NOTE — MR AVS SNAPSHOT
After Visit Summary   11/23/2018    Crispin Rojas    MRN: [id number]           Patient Information     Date Of Birth          1960        Visit Information        Provider Department      11/23/2018 12:30 PM David Evans MD [facility]        Today's Diagnoses     GILA (obstructive sleep apnea)    -  1      Care Instructions      Your BMI is Body mass index is 35.39 kg/(m^2).  Weight management is a personal decision.  If you are interested in exploring weight loss strategies, the following discussion covers the approaches that may be successful. Body mass index (BMI) is one way to tell whether you are at a healthy weight, overweight, or obese. It measures your weight in relation to your height.  A BMI of 18.5 to 24.9 is in the healthy range. A person with a BMI of 25 to 29.9 is considered overweight, and someone with a BMI of 30 or greater is considered obese. More than two-thirds of American adults are considered overweight or obese.  Being overweight or obese increases the risk for further weight gain. Excess weight may lead to heart disease and diabetes.  Creating and following plans for healthy eating and physical activity may help you improve your health.  Weight control is part of healthy lifestyle and includes exercise, emotional health, and healthy eating habits. Careful eating habits lifelong are the mainstay of weight control. Though there are significant health benefits from weight loss, long-term weight loss with diet alone may be very difficult to achieve- studies show long-term success with dietary management in less than 10% of people. Attaining a healthy weight may be especially difficult to achieve in those with severe obesity. In some cases, medications, devices and surgical management might be considered.  What can you do?  If you are overweight or obese and are interested in methods for weight loss, you should discuss this with your provider.     Consider  reducing daily calorie intake by 500 calories.     Keep a food journal.     Avoiding skipping meals, consider cutting portions instead.    Diet combined with exercise helps maintain muscle while optimizing fat loss. Strength training is particularly important for building and maintaining muscle mass. Exercise helps reduce stress, increase energy, and improves fitness. Increasing exercise without diet control, however, may not burn enough calories to loose weight.       Start walking three days a week 10-20 minutes at a time    Work towards walking thirty minutes five days a week     Eventually, increase the speed of your walking for 1-2 minutes at time    In addition, we recommend that you review healthy lifestyles and methods for weight loss available through the National Institutes of Health patient information sites:  http://win.niddk.nih.gov/publications/index.htm    And look into health and wellness programs that may be available through your health insurance provider, employer, local community center, or kathrine club.    Weight management plan: Patient was referred to their PCP to discuss a diet and exercise plan.            Follow-ups after your visit        Your next 10 appointments already scheduled     Dec 03, 2018  8:00 AM CST   Return Visit with Valentino Molina DPM   [facility] ([facility])    [address] [place] [id number]   [phone]            Dec 21, 2018  8:00 AM CST   Nurse Only with Juan M Oshea   [facility] ([facility])    [address] [place] [address]   [phone]            Anthony 10, 2019 10:30 AM CST   PHYSICAL with Aden Lopez MD   [facility] ([facility])    [address] [place] [address]   [phone]            Anthony 10, 2019  2:00 PM CST   Return Visit with SARAH Raymundo CNP  "  [facility] ([facility])    [address]  [facility] [address] [phone]            Jan 14, 2019  8:30 AM CST   Diabetes Education with June Walker RD   [facility] ([facility])    [address]  [facility] [address] [phone]              Who to contact     If you have questions or need follow up information about today's clinic visit or your schedule please contact [facility] directly at [phone].  Normal or non-critical lab and imaging results will be communicated to you by COSMIC COLORhart, letter or phone within 4 business days after the clinic has received the results. If you do not hear from us within 7 days, please contact the clinic through Mapet or phone. If you have a critical or abnormal lab result, we will notify you by phone as soon as possible.  Submit refill requests through Complexa or call your pharmacy and they will forward the refill request to us. Please allow 3 business days for your refill to be completed.          Additional Information About Your Visit        COSMIC COLORhart Information     Complexa gives you secure access to your electronic health record. If you see a primary care provider, you can also send messages to your care team and make appointments. If you have questions, please call your primary care clinic.  If you do not have a primary care provider, please call [phone] and they will assist you.        Care EveryWhere ID     This is your Care EveryWhere ID. This could be used by other organizations to access your [facility] medical records  [id number]        Your Vitals Were     Pulse Respirations Height Pulse Oximetry BMI (Body Mass Index)       63 16 1.791 m (5' 10.5\") 95% 35.39 kg/m2        Blood Pressure from Last 3 Encounters:   11/23/18 136/81   10/11/18 125/63   07/16/18 141/78    Weight from Last 3 Encounters:   11/23/18 113.5 kg (250 lb 3.2 " "oz)   11/09/18 111.2 kg (245 lb 1.6 oz)   10/11/18 108.6 kg (239 lb 8 oz)              We Performed the Following     Comprehensive DME        Primary Care Provider Office Phone # Fax #    Aden Lopez [phone] [address] [facility] [address]        Equal Access to Services     [facility]: Hadii aad ku hadasho Soomaali, waaxda luqadaha, qaybta kaalmada adeegyada, waxay idiin hayaan adeeg kharash la'aan ah. So [facility] [phone].    ATENCIÓN: Si habla español, tiene a gomez disposición servicios gratuitos de asistencia lingüística. [facility] [phone].    We comply with applicable federal civil rights laws and [place] laws. We do not discriminate on the basis of race, color, national origin, age, disability, sex, sexual orientation, or gender identity.            Thank you!     Thank you for choosing [facility]  for your care. Our goal is always to provide you with excellent care. Hearing back from our patients is one way we can continue to improve our services. Please take a few minutes to complete the written survey that you may receive in the mail after your visit with us. Thank you!             Your Updated Medication List - Protect others around you: Learn how to safely use, store and throw away your medicines at www.disposemymeds.org.          This list is accurate as of 11/23/18  1:14 PM.  Always use your most recent med list.                   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 Use one syringe  daily or as directed.    Type 2 diabetes, HbA1C goal < 8% (H)       blood glucose lancets standard    no brand specified    300 each    Use to test blood sugar 3 times daily or as directed.    Type 2 diabetes mellitus with diabetic neuropathy, with long-term current use of insulin (H)       blood glucose monitoring meter device kit    no brand specified    1 kit    Use to test blood sugar 3 times daily or as directed.    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)       * CIALIS 5 MG tablet   Generic drug:  tadalafil     30 tablet    TAKE 1 TABLET BY MOUTH EVERY DAY AS NEEDED    Erectile dysfunction due to diseases classified elsewhere       * CIALIS 5 MG tablet   Generic drug:  tadalafil     30 tablet    TAKE 1 TABLET BY MOUTH EVERY DAY AS NEEDED    Erectile dysfunction due to diseases classified elsewhere       * tadalafil 5 MG tablet    CIALIS    30 tablet    TAKE 1 TABLET BY MOUTH EVERY DAY AS NEEDED    Erectile dysfunction due to diseases classified elsewhere       Co-Enzyme Q10 100 MG Caps      Take 100 mg by mouth daily        * continuous blood glucose monitoring sensor     4 each    For use with Freestyle Corwin Flash  for continuous monitioring of blood glucose levels. Replace sensor every 7-10 days.    Type 2 diabetes mellitus with diabetic peripheral angiopathy and gangrene, with long-term current use of insulin (H)       * DEXCOM G6 SENSOR Misc     1 each    1 each as needed (Use one sensor every 10 days)    Type 2 diabetes mellitus with peripheral neuropathy (H)       DEXCOM G6 TRANSMITTER Misc     1 each    1 Device every 3 months    Type 2 diabetes mellitus with peripheral neuropathy (H)       * FREESTYLE CORWIN READER Iman     1 Device    1 Device as needed     Type 2 diabetes mellitus with diabetic peripheral angiopathy and gangrene, with long-term current use of insulin (H)       * DEXCOM G6  Iman     1 Device    1 each as needed (use to receive continous blood glucose data)    Type 2 diabetes mellitus with peripheral neuropathy (H)       gabapentin 100 MG capsule    NEURONTIN     Take 300 mg by mouth At Bedtime        insulin degludec 100 UNIT/ML pen    TRESIBA    30 mL    Inject 44 Units Subcutaneous daily    Type 2 diabetes mellitus with diabetic peripheral angiopathy and gangrene, with long-term current use of insulin (H)       * insulin lispro 100 UNIT/ML pen    HumaLOG KWIKpen    30 mL    Inject 35 units sq tid.  Dispense 90-day supply or as allowed by insurance.    Type 2 diabetes mellitus with peripheral neuropathy (H)       * HumaLOG KWIKpen 100 UNIT/ML pen   Generic drug:  insulin lispro     30 mL    INJECT 35 UNITS SUB-Q THREE TIMES DAILY    Type 2 diabetes mellitus with peripheral neuropathy (H)       ketoconazole 2 % shampoo    NIZORAL    120 mL    APPLY TO THE AFFECTED AREA AND WASH OFF AFTER 5 MINUTES.    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)       MULTIVITAMIN PO      Take 1 tablet by mouth daily        OMEGA-3 FISH OIL PO      Take 2 g by mouth daily        Pen Needles 31G X 5 MM Misc     200 each    1 Device 5 times daily , TWICE DAILY WITH LANTUS AND 3 TIMES A DAY PRN WITH NOVOLOG FLEXPEN    Type 2 diabetes mellitus with peripheral neuropathy (H)       semaglutide 1 MG/DOSE pen    OZEMPIC    1 pen    Inject 1 mg Subcutaneous once a week    Type 2 diabetes mellitus with peripheral neuropathy (H)       VITAMIN D3 PO      Take 1,000 Units by mouth daily         * Notice:  This list has 9 medication(s) that are the same as other medications prescribed for you. Read the directions carefully, and ask your doctor or other care provider to review them with you.

## 2018-11-26 DIAGNOSIS — L21.9 SEBORRHEIC DERMATITIS: ICD-10-CM

## 2018-11-26 NOTE — TELEPHONE ENCOUNTER
"Requested Prescriptions   Pending Prescriptions Disp Refills     ketoconazole (NIZORAL) 2 % shampoo [Pharmacy Med Name: KETOCONAZOLE 2% SHAMPOO 120ML]  Last Written Prescription Date:  10/22/2018  Last Fill Quantity: 120 mL,  # refills: 0   Last office visit: 5/9/2018 with prescribing provider:  Jessica     Future Office Visit:   Next 5 appointments (look out 90 days)     Dec 03, 2018  8:00 AM CST   Return Visit with Valentino Molina DPM   [facility]    [address] [address]   [phone]            Dec 21, 2018  8:00 AM CST   Nurse Only with Juan M Oshea   [facility]    [address] [place] [address]   [phone]            Anthony 10, 2019 10:30 AM CST   PHYSICAL with Aden Lopez MD   [facility] ([facility])    [address] [place] [address]   [phone]            Anthony 10, 2019  2:00 PM CST   Return Visit with SARAH Raymundo CNP   [facility] ([facility])    [address] [place] [address]   [phone]                  120 mL 0     Sig: APPLY TO THE AFFECTED AREA AND WASH OFF AFTER 5 MINUTES.    Antifungal Agents Passed    11/26/2018  5:12 PM       Passed - Recent (12 mo) or future (30 days) visit within the authorizing provider's specialty    Patient had office visit in the last 12 months or has a visit in the next 30 days with authorizing provider or within the authorizing provider's specialty.  See \"Patient Info\" tab in inbasket, or \"Choose Columns\" in Meds & Orders section of the refill encounter.             Passed - Not Fluconazole or Terconazole     If oral Fluconazole or Terconazole, may refill if indicated in progress notes.             "

## 2018-11-28 RX ORDER — KETOCONAZOLE 20 MG/ML
SHAMPOO TOPICAL
Qty: 120 ML | Status: SHIPPED | OUTPATIENT
Start: 2018-11-28 | End: 2018-12-26

## 2018-11-28 NOTE — TELEPHONE ENCOUNTER
Routing refill request to provider to review approval because:  Ok for ongoing refills  June Thomas RN, BSN  Message handled by Nurse Triage.

## 2018-12-03 ENCOUNTER — OFFICE VISIT (OUTPATIENT)
Dept: PODIATRY | Facility: CLINIC | Age: 58
End: 2018-12-03
Payer: COMMERCIAL

## 2018-12-03 VITALS
SYSTOLIC BLOOD PRESSURE: 134 MMHG | DIASTOLIC BLOOD PRESSURE: 78 MMHG | BODY MASS INDEX: 35 KG/M2 | WEIGHT: 250 LBS | HEIGHT: 71 IN

## 2018-12-03 DIAGNOSIS — L98.9 NON-HEALING SKIN LESION: ICD-10-CM

## 2018-12-03 DIAGNOSIS — E11.42 TYPE 2 DIABETES MELLITUS WITH PERIPHERAL NEUROPATHY (H): Primary | ICD-10-CM

## 2018-12-03 DIAGNOSIS — Z89.429 STATUS POST AMPUTATION OF TOE (H): ICD-10-CM

## 2018-12-03 PROCEDURE — 99213 OFFICE O/P EST LOW 20 MIN: CPT | Performed by: PODIATRIST

## 2018-12-03 NOTE — MR AVS SNAPSHOT
After Visit Summary   12/3/2018    Crispin Rojas    MRN: [id number]           Patient Information     Date Of Birth          1960        Visit Information        Provider Department      12/3/2018 8:00 AM Valentino Candelaria DPM [facility]        Today's Diagnoses     Type 2 diabetes mellitus with peripheral neuropathy (H)    -  1    Status post amputation of toe (H)          Care Instructions    Thank you for choosing [facility]!    DR. CANDELARIA'S CLINIC LOCATIONS     MONDAY - [place] WEDNESDAY - [place]   [address] [place] [address] [address] [place] [address] [place] [address]   [phone] / [phone] / [phone]       THURSDAY - [place] SCHEDULE SURGERY: [phone]   [address] APPOINTMENTS: [phone]   [place] [address] BILLING QUESTIONS: [phone] / [phone]       [facility] LOCATIONS  [facility]  [address]  [place] [address]  Phone: [phone]  Fax: [phone] [facility]  [address]  [place] [address]  Phone: [phone]   Fax: [phone]   [facility]  [address]  [place] [address]  Phone: [phone]  Fax: [phone] [facility]  [address]  [place] [address]  Phone: [phone]   Fax: [phone]   [facility]   [address]  [place] [address]  Phone: [phone]   Fax: [phone] * Please call any location listed to make an appointment for a casting/fitting. Your referral was sent to their central office and they will all have the order on file.     DIABETES AND YOUR FEET  Diabetes can result in several problems in the feet including ulcers (open sores) and amputations. Two of the most important reasons why people develop foot problems when they have  "diabetes is : 1. Neuropathy (loss of feeling)  2. Vascular disease (loss or decrease of blood flow).    Neuropathy is a term used to describe a loss of nerve function.  Patients with diabetes are at risk of developing neuropathy if their sugars continue to run high and are above the normal value. One theory for neuropathy is that the \"extra\" sugar in the body enters the nerves and is broken down. These by-products build up in the nerve causing it to swell and impairing nerve function. Often times, this can be prevented by controlling your sugars, dieting and exercise.    When a person develops neuropathy, they usually begin to feel numbness or tingling in their feet and sometime in their legs.  Other symptoms may include painful burning or hot feet, tingling or feeling like insects or ants are crawling on your feet or legs.  If the diabetes is sever and the sugars run high for long periods of time, neuropathy can also occur in the hands.    Vascular disease  is a term used to describe a loss or decrease in circulation (blood flow). There is a problem in getting blood and oxygen to areas that need it. Similar to neuropathy, sugars can build up in the walls of the arteries (blood vessels) and cause them to become swollen, thickened and hardened. This decreases the amount of blood that can go to an area that needs it. Though this is common in the legs of diabetic patients, it can also affect other arteries (blood vessels) in the body such as in the heart and eyes.    In the legs, vascular disease usually results in cramping. Patients who develop leg cramps after walking the same distance every time (i.e. One block, half a mile, ect.) need to let their doctors know so that their circulation may be checked. Cramps causing severe pain in the feet and/or legs while sleeping and the cramps go away when you stand or hang your legs off the side of the bed, may also be a sign of poor blood circulation.  Occasional cramping in " cold weather or on rare occasions with activity may not be due to poor circulation, but you should inform your doctor.    PREVENTION OF THESE DISEASES  The key to prevention is good blood sugar control. Poor blood sugar control is a big reason many of these problems start. Physical activity (exercise) is a very good way to help decrease your blood sugars. Exercise can lower your blood sugar, blood pressure, and cholesterol. It also reduces your risk for heart disease and stroke, relieves stress, and strengthens your heart, muscles and bones.  In addition, regular activity helps insulin work better, improves your blood circulation, and keeps your joints flexible. If you're trying to lose weight, a combination of exercise and wise food choices can help you reach your target weight and maintain it.      PAIN MANAGEMENT  1.Blood Sugar Control - Most important  2. Medications such as:  Amytriptylline, duloxetine, gabapentin, lyrica, tramadol  3. Nutritional therapy:  Vitamin B6 (100mg daily), Vitamin B12 (75mcg daily), Vitamin D 2000 IU daily), Alpha-Lipoic Acid (600-1800mg daily), Acetyl-L-Carnitine (500-1000mg TID, L-methyl folate (1500mcg daily)    ** Metformin can block Vitamin B6 and B12 so it is important to supplement**    FOOT CARE RECOMMENDATIONS   1. Wash your feet with lukewarm water and a mild soap and then dry them thoroughly, especially between the toes.     2. Examine your feet daily looking for cuts, corns, blisters, cracks, ect, especially after wearing new shoes. Make sure to look between your toes. If you cannot see the bottom of your feet, set a mirror on the floor and hold your foot over it, or ask a spouse, friend or family member to examine your feet for you. Contact your doctor immediately if new problems are noted or if sores are not healing.     3. Immediately apply moisturizer to the tops and bottoms of your feet, avoiding areas between the toes. Hand lotion (Intesive Care, Candie, Eucerin,  Neutrogena, Curel, ect) is sufficient unless your doctor prescribes a medicated lotion. Apply sunscreen to your feet when going swimming outside.     4. Use clean comfortable shoes, wear white socks (if you have any bleeding or drainage, you will see it on white socks). Socks should not have thick seams or cut off the circulation around the leg. Break in new shoes slowly and rotate with older shoes until broken in. Check the inside of your shoes with your hand to look for areas of irritation or objects that may have fallen into your shoes.       5. Keep slippers by the side of your bed for use during the night.     6.  Shoes should be fitted by a professional and should not cause areas of irritation.  Check your feet regularly when wearing a new pair of shoes and replace them as needed.     7.  Talk to your doctor about proper exercise. Exercise and stretching stimulate blood flow to your feet and maintain proper glucose levels.     8.  Monitor your blood glucose level as instructed by your doctor. Notify your doctor immediately if your blood sugar is abnormally high or low.    9. Cut your nails straight across, but then gently round any sharp edges with a cardboard nail file. If you have neuropathy, peripheral vascular disease or cannot see that well to trim your own toenails contact [facility] ([phone]) or [facility] ([phone]).      THINGS TO AVOID DOING   1.  Do not soak your feet if you have an open sore. Use only lukewarm water and always check the temperature with your hand as hot water can easily burn your feet.       2.  Never use a hot water bottle or heating pad on your feet. Also do not apply cold compresses to your feet. With decreased sensation, you could burn or freeze your feet.       3.  Do not apply any of these to your feet:    -  Over the counter medicine for corns or warts    -  Harsh chemicals like boric acid    -  Do not self-treat corns, cuts, blisters or infections. Always consult  your doctor.       4.  Do not wear sandals, slippers or walk barefoot, especially on hot sand or concrete or other harsh surfaces.     5.  If you smoke, stop!!!        SIGNS OF INFECTION  expanding redness around the wound   yellow or greenish-colored pus or cloudy wound drainage   red streaking spreading from the wound   increased swelling, tenderness, or pain around the wound   fever  *If you notice any of these signs of infection, call us right away!          FYI:  BODY MASS INDEX (BMI)  Many things can cause foot and ankle problems. Foot structure, activity level, foot mechanics and injuries are common causes of pain. One very important issue that often goes unmentioned, is body weight. Extra weight can cause increased stress on muscles, ligaments, bones and tendons. Sometimes just a few extra pounds is all it takes to put one over her/his threshold. Without reducing that stress, it can be difficult to alleviate pain. Some people are uncomfortable addressing this issue, but we feel it is important for you to think about it. As Foot & Ankle specialists, our job is addressing the lower extremity problem and possible causes. Regarding extra body weight, we encourage patients to discuss diet and weight management plans with their primary care doctors. It is this team approach that gives you the best opportunity for pain relief and getting you back on your feet.                Follow-ups after your visit        Additional Services     ORTHOTICS REFERRAL       **This referral order prints off in the [facility]  (Orthotics & Prosthetics) Central Scheduling Office**    The [facility] Central Scheduling Staff will contact the patient to schedule appointments.     Central Scheduling Contact Information: [phone] ([place])    [facility] Orth[facility]: Foot Orthotics  He is not interested in extra-depths shoes    Please be aware that coverage of these services is subject to the terms and  limitations of your health insurance plan.  Call member services at your health plan with any benefit or coverage questions.      Please bring the following to your appointment:    >>   Any x-rays, CTs or MRIs which have been performed.  Contact the facility where they were done to arrange for  prior to your scheduled appointment.    >>   List of current medications   >>   This referral request   >>   Any documents/labs given to you for this referral                  Your next 10 appointments already scheduled     Dec 03, 2018 10:30 AM CST   PAP SETUP with  SLEEP CENTER DME   [facility] ([facility])    [address]  [facility] [address]   [phone]            Dec 21, 2018  8:00 AM CST   Nurse Only with Juan M Oshea   [facility] ([facility]    [address] [place] [id number] [phone]            Atnhony 10, 2019 10:30 AM CST   PHYSICAL with Aden Lopez MD   [facility] ([facility])    [address] [place] [address]   [phone]            Anthony 10, 2019  2:00 PM CST   Return Visit with SARAH Raymundo CNP   [facility] ([facility])    [address] [place] [id number] [phone]            Jan 14, 2019  8:30 AM CST   Diabetes Education with June Walker RD   [facility] ([facility])    [address] [place] [id number] [phone]              Who to contact     If you have questions or need follow up information about today's clinic visit or your schedule please contact [facility] directly at [phone].  Normal or non-critical lab and imaging results will be communicated to you by MyChart, letter or phone within 4 business days after the clinic has received the  "results. If you do not hear from us within 7 days, please contact the clinic through [employer] or phone. If you have a critical or abnormal lab result, we will notify you by phone as soon as possible.  Submit refill requests through [employer] or call your pharmacy and they will forward the refill request to us. Please allow 3 business days for your refill to be completed.          Additional Information About Your Visit        [email or web address] Information     [employer] gives you secure access to your electronic health record. If you see a primary care provider, you can also send messages to your care team and make appointments. If you have questions, please call your primary care clinic.  If you do not have a primary care provider, please call [phone] and they will assist you.        Care EveryWhere ID     This is your Care EveryWhere ID. This could be used by other organizations to access your [facility] medical records  [id number]        Your Vitals Were     Height BMI (Body Mass Index)                5' 10.5\" (1.791 m) 35.36 kg/m2           Blood Pressure from Last 3 Encounters:   12/03/18 134/78   11/23/18 136/81   10/11/18 125/63    Weight from Last 3 Encounters:   12/03/18 250 lb (113.4 kg)   11/23/18 250 lb 3.2 oz (113.5 kg)   11/09/18 245 lb 1.6 oz (111.2 kg)              We Performed the Following     ORTHOTICS REFERRAL        Primary Care Provider Office Phone # Fax #    Aden Ethan Lopez [phone] [address] [address]        Equal Access to Services     ELI MARTÍNEZ : Hadii aad ku hadasho Soomaali, waaxda luqadaha, qaybta kaalmada adeegyaandrew, blanca rhodes. So [facility] [phone].    ATENCIÓN: Si habla español, tiene a gomez disposición servicios gratuitos de asistencia lingüística. Llame al [phone].    We comply with applicable federal civil rights laws and [place] laws. We do not discriminate on the basis of race, color, national origin, " "age, disability, sex, sexual orientation, or gender identity.            Thank you!     Thank you for choosing [facility]  for your care. Our goal is always to provide you with excellent care. Hearing back from our patients is one way we can continue to improve our services. Please take a few minutes to complete the written survey that you may receive in the mail after your visit with us. Thank you!             Your Updated Medication List - Protect others around you: Learn how to safely use, store and throw away your medicines at www.disposemymeds.org.          This list is accurate as of 12/3/18  8:26 AM.  Always use your most recent med list.                   Brand Name Dispense Instructions for use Diagnosis    amLODIPine 5 MG tablet    NORVASC    90 tablet    TAKE 1 TABLET BY MOUTH EVERY DAY    Other specified transient cerebral ischemias       atorvastatin 40 MG tablet    LIPITOR    90 tablet    TAKE 1 TABLET BY MOUTH EVERY DAY    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), Hyperlipidemia LDL goal <100       BD insulin syringe ultrafine 30G X 1/2\" 0.3 ML miscellaneous   Generic drug:  insulin syringe-needle U-100     100 each    1 Syringe daily. Use one syringe  daily or as directed.    Type 2 diabetes, HbA1C goal < 8% (H)       blood glucose lancets standard    NO BRAND SPECIFIED    300 each    Use to test blood sugar 3 times daily or as directed.    Type 2 diabetes mellitus with diabetic neuropathy, with long-term current use of insulin (H)       blood glucose monitoring meter device kit    NO BRAND SPECIFIED    1 kit    Use to test blood sugar 3 times daily or as directed.    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)       blood glucose monitoring test strip    NO BRAND SPECIFIED    300 strip    Use to test blood sugars 3 times daily or as directed    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)       * CIALIS 5 MG tablet "   Generic drug:  tadalafil     30 tablet    TAKE 1 TABLET BY MOUTH EVERY DAY AS NEEDED    Erectile dysfunction due to diseases classified elsewhere       * CIALIS 5 MG tablet   Generic drug:  tadalafil     30 tablet    TAKE 1 TABLET BY MOUTH EVERY DAY AS NEEDED    Erectile dysfunction due to diseases classified elsewhere       * tadalafil 5 MG tablet    CIALIS    30 tablet    TAKE 1 TABLET BY MOUTH EVERY DAY AS NEEDED    Erectile dysfunction due to diseases classified elsewhere       Co-Enzyme Q10 100 MG Caps      Take 100 mg by mouth daily        * continuous blood glucose monitoring sensor     4 each    For use with Freestyle Corwin Flash  for continuous monitioring of blood glucose levels. Replace sensor every 7-10 days.    Type 2 diabetes mellitus with diabetic peripheral angiopathy and gangrene, with long-term current use of insulin (H)       * DEXCOM G6 SENSOR Misc     1 each    1 each as needed (Use one sensor every 10 days)    Type 2 diabetes mellitus with peripheral neuropathy (H)       DEXCOM G6 TRANSMITTER Misc     1 each    1 Device every 3 months    Type 2 diabetes mellitus with peripheral neuropathy (H)       * FREESTYLE CORWIN READER Iman     1 Device    1 Device as needed    Type 2 diabetes mellitus with diabetic peripheral angiopathy and gangrene, with long-term current use of insulin (H)       * DEXCOM G6  Iman     1 Device    1 each as needed (use to receive continous blood glucose data)    Type 2 diabetes mellitus with peripheral neuropathy (H)       gabapentin 100 MG capsule    NEURONTIN     Take 300 mg by mouth At Bedtime        insulin degludec 100 UNIT/ML pen    TRESIBA    30 mL    Inject 44 Units Subcutaneous daily    Type 2 diabetes mellitus with diabetic peripheral angiopathy and gangrene, with long-term current use of insulin (H)       * insulin lispro 100 UNIT/ML pen    HumaLOG KWIKpen    30 mL    Inject 35 units sq tid.  Dispense 90-day supply or as allowed by insurance.     Type 2 diabetes mellitus with peripheral neuropathy (H)       * HumaLOG KWIKpen 100 UNIT/ML pen   Generic drug:  insulin lispro     30 mL    INJECT 35 UNITS SUB-Q THREE TIMES DAILY    Type 2 diabetes mellitus with peripheral neuropathy (H)       ketoconazole 2 % external shampoo    NIZORAL    120 mL    APPLY TO THE AFFECTED AREA AND WASH OFF AFTER 5 MINUTES.    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)       MULTIVITAMIN PO      Take 1 tablet by mouth daily        OMEGA-3 FISH OIL PO      Take 2 g by mouth daily        Pen Needles 31G X 5 MM Misc     200 each    1 Device 5 times daily , TWICE DAILY WITH LANTUS AND 3 TIMES A DAY PRN WITH NOVOLOG FLEXPEN    Type 2 diabetes mellitus with peripheral neuropathy (H)       semaglutide 1 MG/DOSE pen    OZEMPIC    1 pen    Inject 1 mg Subcutaneous once a week    Type 2 diabetes mellitus with peripheral neuropathy (H)       VITAMIN D3 PO      Take 1,000 Units by mouth daily        * Notice:  This list has 9 medication(s) that are the same as other medications prescribed for you. Read the directions carefully, and ask your doctor or other care provider to review them with you.

## 2018-12-03 NOTE — LETTER
"    12/3/2018         RE: Crispin Rojas  [address] [facility] [address]        Dear Colleague,    Thank you for referring your patient, Crispin Rojas, to the [facility]. Please see a copy of my visit note below.    ASSESSMENT/PLAN:    Encounter Diagnoses   Name Primary?     Type 2 diabetes mellitus with peripheral neuropathy (H) Yes     Status post amputation of toe (H)      Non-healing skin lesion, right shin        No evidence of skin irritation or breakdown on his feet.   He was advised to keep his feet moisturized.     Pt is referred to the [facility] for prescription multi-density orthoses.      Pt is instructed to inspect feet daily, wear a supportive shoe at all times, and avoid walking barefoot.  He is instructed to call if any sores develop.   Importance of good blood sugar control emphasized.   He verbalized understanding.    Scheduled to return for biopsy of the non-healing lesion, right shin.    Body mass index is 35.36 kg/(m^2).    Weight management plan: Patient was referred to their PCP to discuss a diet and exercise plan.      Valentino Molina DPM, FACFAS, MS    [facility]      ____________________________________________________________________    HPI:         Chief Complaint: \"check up\"  Diabetic foot exam.   Last seen 3/12/18. At that time he had a ulcer on the right foot.  He stated that it has healed.  Type 2 DM. 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   He is not currently using custom orthoses.  He has in the past.   Last Hgb A1C = 7.6%.    He asked about a non-healing skin lesion on his right shin.    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     CARDIOVASCULAR SCREENING; LDL GOAL LESS THAN 100     Morbid obesity, unspecified obesity type (H)     Other specified transient cerebral " ischemias     Calculus of kidney     Chronic left shoulder pain     Cervicalgia     Type 2 diabetes mellitus with peripheral neuropathy (H)     Hyperlipidemia LDL goal <100     Benign essential hypertension     Right bundle branch block     GILA (obstructive sleep apnea)     Past Surgical History:   Procedure Laterality Date     AMPUTATE FOOT Left 8/18/2016    Procedure: AMPUTATE FOOT;  Surgeon: Jack Younger DPM;  Location: [facility] OR     AMPUTATE TOE(S) Right 2/1/2016    Procedure: AMPUTATE TOE(S);  Surgeon: Rachelle Manriquez DPM, Pod;  Location: [facility] OR     ANGIOGRAM       COLONOSCOPY       COMBINED CYSTOSCOPY, RETROGRADES, URETEROSCOPY, INSERT STENT Left 8/21/2016    Procedure: COMBINED CYSTOSCOPY, RETROGRADES, URETEROSCOPY, INSERT STENT;  Surgeon: Artemio Valenzuela MD;  Location: [facility] OR     COMBINED CYSTOSCOPY, RETROGRADES, URETEROSCOPY, LASER HOLMIUM LITHOTRIPSY URETER(S), INSERT STENT Left 10/3/2016    Procedure: COMBINED CYSTOSCOPY, RETROGRADES, URETEROSCOPY, LASER HOLMIUM LITHOTRIPSY URETER(S), INSERT STENT;  Surgeon: Artemio Valenzuela MD;  Location: [facility] OR     EXTRACORPOREAL SHOCK WAVE LITHOTRIPSY (ESWL) Left 9/8/2016    Procedure: EXTRACORPOREAL SHOCK WAVE LITHOTRIPSY (ESWL);  Surgeon: Artemio Valenzuela MD;  Location: [facility] OR     RECESSION GASTROCNEMIUS Right 2/1/2016    Procedure: RECESSION GASTROCNEMIUS;  Surgeon: Rachlele Manriquez DPM, Pod;  Location: [facility] OR     Current Outpatient Prescriptions   Medication Sig Dispense Refill     amLODIPine (NORVASC) 5 MG tablet TAKE 1 TABLET BY MOUTH EVERY DAY 90 tablet 0     atorvastatin (LIPITOR) 40 MG tablet TAKE 1 TABLET BY MOUTH EVERY DAY 90 tablet 2     blood glucose (NO BRAND SPECIFIED) lancets standard Use to test blood sugar 3 times daily or as directed. 300 each 3     blood glucose monitoring (NO BRAND SPECIFIED) meter device kit Use to test blood sugar 3 times daily or as directed. 1 kit 0     blood glucose monitoring (NO BRAND SPECIFIED) test  "strip Use to test blood sugars 3 times daily or as directed 300 strip 3     Cholecalciferol (VITAMIN D3 PO) Take 1,000 Units by mouth daily        CIALIS 5 MG tablet TAKE 1 TABLET BY MOUTH EVERY DAY AS NEEDED 30 tablet 0     CIALIS 5 MG tablet TAKE 1 TABLET BY MOUTH EVERY DAY AS NEEDED 30 tablet 0     Co-Enzyme Q10 100 MG CAPS Take 100 mg by mouth daily        Continuous Blood Gluc  (DEXCOM G6 ) LESLY 1 each as needed (use to receive continous blood glucose data) 1 Device 0     Continuous Blood Gluc  (FREESTYLE LUCERO READER) LESLY 1 Device as needed 1 Device 1     Continuous Blood Gluc Sensor (DEXCOM G6 SENSOR) MISC 1 each as needed (Use one sensor every 10 days) 1 each 13     Continuous Blood Gluc Transmit (DEXCOM G6 TRANSMITTER) MISC 1 Device every 3 months 1 each 4     continuous blood glucose monitoring (FREESTYLE LUCERO) sensor For use with Freestyle Lucero Flash  for continuous monitioring of blood glucose levels. Replace sensor every 7-10 days. 4 each 3     gabapentin (NEURONTIN) 100 MG capsule Take 300 mg by mouth At Bedtime       HUMALOG KWIKPEN 100 UNIT/ML soln INJECT 35 UNITS SUB-Q THREE TIMES DAILY 30 mL 3     insulin degludec (TRESIBA) 100 UNIT/ML pen Inject 44 Units Subcutaneous daily 30 mL 3     insulin lispro (HUMALOG KWIKPEN) 100 UNIT/ML injection Inject 35 units sq tid.  Dispense 90-day supply or as allowed by insurance. 30 mL 3     Insulin Pen Needle (PEN NEEDLES) 31G X 5 MM MISC 1 Device 5 times daily , TWICE DAILY WITH LANTUS AND 3 TIMES A DAY PRN WITH NOVOLOG FLEXPEN 200 each 11     Insulin Syringe-Needle U-100 (BD INSULIN SYRINGE ULTRAFINE) 30G X 1/2\" 0.3 ML MISC 1 Syringe daily. Use one syringe  daily or as directed. 100 each prn     ketoconazole (NIZORAL) 2 % external shampoo APPLY TO THE AFFECTED AREA AND WASH OFF AFTER 5 MINUTES. 120 mL .     lisinopril (PRINIVIL/ZESTRIL) 20 MG tablet Take 1 tablet (20 mg) by mouth daily 90 tablet 0     metFORMIN (GLUCOPHAGE-XR) " "500 MG 24 hr tablet TAKE 2 TABLETS BY MOUTH TWICE DAILY WITH MEALS 360 tablet 0     Multiple Vitamins-Minerals (MULTIVITAMIN PO) Take 1 tablet by mouth daily        Omega-3 Fatty Acids (OMEGA-3 FISH OIL PO) Take 2 g by mouth daily        Semaglutide (OZEMPIC) 1 MG/DOSE SOPN Inject 1 mg Subcutaneous once a week 1 pen 4     tadalafil (CIALIS) 5 MG tablet TAKE 1 TABLET BY MOUTH EVERY DAY AS NEEDED 30 tablet 1       ROS:    A 10-point review of systems was performed.  It is positive for that noted in the HPI and as seen below.  All other systems found to be negative.     Numbness in feet?  yes   Calf pain with walking? no  Recent foot/ankle injury? no  Weight change  over past 12 months? 16# gain  Self perception as overweight? yes  Recent flu-like symptoms? no  Joint pain other than feet ? Shoulder and hip pain    EXAM:    Vitals: /78 (BP Location: Right arm, Patient Position: Chair, Cuff Size: Adult Large)  Ht 5' 10.5\" (1.791 m)  Wt 250 lb (113.4 kg)  BMI 35.36 kg/m2  BMI: Body mass index is 35.36 kg/(m^2).  Height: 5' 10.5\"    Constitutional/ general:  Pt is in no apparent distress, appears well-nourished.  Cooperative with history and physical exam.     Diabetic Foot Exam (details below):      Vascular:  Pedal pulses are palpable bilaterally for both the DP and PT arteries.  CFT < 3 sec.  No edema.  Pedal hair growth noted.     Neuro:  Alert and oriented x 3. Coordinated gait.  Light touch sensation is intact to the L4, L5, S1 distributions. No obvious deficits.  No evidence of neurological-based weakness, spasticity, or contracture in the lower extremities.     Protective sensation is diminished bilateral foot per [employer]-Eilas Monofilament testing.    Derm: Normal texture and turgor.  No erythema, ecchymosis, or cyanosis.  No open lesions.  There is a 1cm round superificial lesion on his right shin. It looks like an excoriation.    Musculoskeletal:    Lower extremity muscle strength is normal.  Patient " is ambulatory without an assistive device or brace .  Left hallux amputation; partial right hallux amputation.      Valentino Molina DPM, CLYDE, MS    [facility]              Again, thank you for allowing me to participate in the care of your patient.        Sincerely,        Valentino Molina DPM

## 2018-12-03 NOTE — PATIENT INSTRUCTIONS
"Thank you for choosing [facility]!    [facility] LOCATIONS     MONDAY - [place] WEDNESDAY - [place]   [address] [place] [address] [place] [address]   [phone] / [phone] / [phone]       THURSDAY - [place] SCHEDULE SURGERY: [phone]   [address] APPOINTMENTS: [phone]   [place] [address] BILLING QUESTIONS: [phone] / [phone]       [facility] LOCATIONS  [facility]  [address]  [place] [address]  Phone: [phone]  Fax: [phone] [facility]  [address]  [place] [address]  Phone: [phone]   Fax: [phone]   [facility]  [address]  [place] [address]  Phone: [phone]  Fax: [phone] [facility]  [address]  [place] [address]  Phone: [phone]   Fax: [phone]   [facility]   [address]  [place] [address]  Phone: [phone]   Fax: [phone] * Please call any location listed to make an appointment for a casting/fitting. Your referral was sent to their central office and they will all have the order on file.     DIABETES AND YOUR FEET  Diabetes can result in several problems in the feet including ulcers (open sores) and amputations. Two of the most important reasons why people develop foot problems when they have diabetes is : 1. Neuropathy (loss of feeling)  2. Vascular disease (loss or decrease of blood flow).    Neuropathy is a term used to describe a loss of nerve function.  Patients with diabetes are at risk of developing neuropathy if their sugars continue to run high and are above the normal value. One theory for neuropathy is that the \"extra\" sugar in the body enters the nerves and is broken down. These by-products build up in the nerve " causing it to swell and impairing nerve function. Often times, this can be prevented by controlling your sugars, dieting and exercise.    When a person develops neuropathy, they usually begin to feel numbness or tingling in their feet and sometime in their legs.  Other symptoms may include painful burning or hot feet, tingling or feeling like insects or ants are crawling on your feet or legs.  If the diabetes is sever and the sugars run high for long periods of time, neuropathy can also occur in the hands.    Vascular disease  is a term used to describe a loss or decrease in circulation (blood flow). There is a problem in getting blood and oxygen to areas that need it. Similar to neuropathy, sugars can build up in the walls of the arteries (blood vessels) and cause them to become swollen, thickened and hardened. This decreases the amount of blood that can go to an area that needs it. Though this is common in the legs of diabetic patients, it can also affect other arteries (blood vessels) in the body such as in the heart and eyes.    In the legs, vascular disease usually results in cramping. Patients who develop leg cramps after walking the same distance every time (i.e. One block, half a mile, ect.) need to let their doctors know so that their circulation may be checked. Cramps causing severe pain in the feet and/or legs while sleeping and the cramps go away when you stand or hang your legs off the side of the bed, may also be a sign of poor blood circulation.  Occasional cramping in cold weather or on rare occasions with activity may not be due to poor circulation, but you should inform your doctor.    PREVENTION OF THESE DISEASES  The key to prevention is good blood sugar control. Poor blood sugar control is a big reason many of these problems start. Physical activity (exercise) is a very good way to help decrease your blood sugars. Exercise can lower your blood sugar, blood pressure, and cholesterol. It also  reduces your risk for heart disease and stroke, relieves stress, and strengthens your heart, muscles and bones.  In addition, regular activity helps insulin work better, improves your blood circulation, and keeps your joints flexible. If you're trying to lose weight, a combination of exercise and wise food choices can help you reach your target weight and maintain it.      PAIN MANAGEMENT  1.Blood Sugar Control - Most important  2. Medications such as:  Amytriptylline, duloxetine, gabapentin, lyrica, tramadol  3. Nutritional therapy:  Vitamin B6 (100mg daily), Vitamin B12 (75mcg daily), Vitamin D 2000 IU daily), Alpha-Lipoic Acid (600-1800mg daily), Acetyl-L-Carnitine (500-1000mg TID, L-methyl folate (1500mcg daily)    ** Metformin can block Vitamin B6 and B12 so it is important to supplement**    FOOT CARE RECOMMENDATIONS   1. Wash your feet with lukewarm water and a mild soap and then dry them thoroughly, especially between the toes.     2. Examine your feet daily looking for cuts, corns, blisters, cracks, ect, especially after wearing new shoes. Make sure to look between your toes. If you cannot see the bottom of your feet, set a mirror on the floor and hold your foot over it, or ask a spouse, friend or family member to examine your feet for you. Contact your doctor immediately if new problems are noted or if sores are not healing.     3. Immediately apply moisturizer to the tops and bottoms of your feet, avoiding areas between the toes. Hand lotion (Intesive Care, Candie, Eucerin, Neutrogena, Curel, ect) is sufficient unless your doctor prescribes a medicated lotion. Apply sunscreen to your feet when going swimming outside.     4. Use clean comfortable shoes, wear white socks (if you have any bleeding or drainage, you will see it on white socks). Socks should not have thick seams or cut off the circulation around the leg. Break in new shoes slowly and rotate with older shoes until broken in. Check the inside of  your shoes with your hand to look for areas of irritation or objects that may have fallen into your shoes.       5. Keep slippers by the side of your bed for use during the night.     6.  Shoes should be fitted by a professional and should not cause areas of irritation.  Check your feet regularly when wearing a new pair of shoes and replace them as needed.     7.  Talk to your doctor about proper exercise. Exercise and stretching stimulate blood flow to your feet and maintain proper glucose levels.     8.  Monitor your blood glucose level as instructed by your doctor. Notify your doctor immediately if your blood sugar is abnormally high or low.    9. Cut your nails straight across, but then gently round any sharp edges with a cardboard nail file. If you have neuropathy, peripheral vascular disease or cannot see that well to trim your own toenails contact [facility] [employer] ([phone]) or [facility] [employer] ([phone]).      THINGS TO AVOID DOING   1.  Do not soak your feet if you have an open sore. Use only lukewarm water and always check the temperature with your hand as hot water can easily burn your feet.       2.  Never use a hot water bottle or heating pad on your feet. Also do not apply cold compresses to your feet. With decreased sensation, you could burn or freeze your feet.       3.  Do not apply any of these to your feet:    -  Over the counter medicine for corns or warts    -  Harsh chemicals like boric acid    -  Do not self-treat corns, cuts, blisters or infections. Always consult your doctor.       4.  Do not wear sandals, slippers or walk barefoot, especially on hot sand or concrete or other harsh surfaces.     5.  If you smoke, stop!!!        SIGNS OF INFECTION  expanding redness around the wound   yellow or greenish-colored pus or cloudy wound drainage   red streaking spreading from the wound   increased swelling, tenderness, or pain around the wound   fever  *If you notice any of these signs of infection,  call us right away!          FYI:  BODY MASS INDEX (BMI)  Many things can cause foot and ankle problems. Foot structure, activity level, foot mechanics and injuries are common causes of pain. One very important issue that often goes unmentioned, is body weight. Extra weight can cause increased stress on muscles, ligaments, bones and tendons. Sometimes just a few extra pounds is all it takes to put one over her/his threshold. Without reducing that stress, it can be difficult to alleviate pain. Some people are uncomfortable addressing this issue, but we feel it is important for you to think about it. As Foot & Ankle specialists, our job is addressing the lower extremity problem and possible causes. Regarding extra body weight, we encourage patients to discuss diet and weight management plans with their primary care doctors. It is this team approach that gives you the best opportunity for pain relief and getting you back on your feet.

## 2018-12-03 NOTE — PROGRESS NOTES
"ASSESSMENT/PLAN:    Encounter Diagnoses   Name Primary?     Type 2 diabetes mellitus with peripheral neuropathy (H) Yes     Status post amputation of toe (H)      Non-healing skin lesion, right shin        No evidence of skin irritation or breakdown on his feet.   He was advised to keep his feet moisturized.     Pt is referred to the [facility] for prescription multi-density orthoses.      Pt is instructed to inspect feet daily, wear a supportive shoe at all times, and avoid walking barefoot.  He is instructed to call if any sores develop.   Importance of good blood sugar control emphasized.   He verbalized understanding.    Scheduled to return for biopsy of the non-healing lesion, right shin.    Body mass index is 35.36 kg/(m^2).    Weight management plan: Patient was referred to their PCP to discuss a diet and exercise plan.      Valentino Molina DPM, FACFAS, MS    [facility]      ____________________________________________________________________    HPI:         Chief Complaint: \"check up\"  Diabetic foot exam.   Last seen 3/12/18. At that time he had a ulcer on the right foot.  He stated that it has healed.  Type 2 DM. 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   He is not currently using custom orthoses.  He has in the past.   Last Hgb A1C = 7.6%.    He asked about a non-healing skin lesion on his right shin.    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     CARDIOVASCULAR SCREENING; LDL GOAL LESS THAN 100     Morbid obesity, unspecified obesity type (H)     Other specified transient cerebral ischemias     Calculus of kidney     Chronic left shoulder pain     Cervicalgia     Type 2 diabetes mellitus with peripheral neuropathy (H)     Hyperlipidemia LDL goal <100     Benign essential hypertension     Right bundle branch block     GILA (obstructive sleep apnea) "     Past Surgical History:   Procedure Laterality Date     AMPUTATE FOOT Left 8/18/2016    Procedure: AMPUTATE FOOT;  Surgeon: Jack Younger DPM;  Location: [facility] OR     AMPUTATE TOE(S) Right 2/1/2016    Procedure: AMPUTATE TOE(S);  Surgeon: Rachelle Manriquez DPM, Pod;  Location: [facility] OR     ANGIOGRAM       COLONOSCOPY       COMBINED CYSTOSCOPY, RETROGRADES, URETEROSCOPY, INSERT STENT Left 8/21/2016    Procedure: COMBINED CYSTOSCOPY, RETROGRADES, URETEROSCOPY, INSERT STENT;  Surgeon: Artemio Valenzuela MD;  Location: [facility] OR     COMBINED CYSTOSCOPY, RETROGRADES, URETEROSCOPY, LASER HOLMIUM LITHOTRIPSY URETER(S), INSERT STENT Left 10/3/2016    Procedure: COMBINED CYSTOSCOPY, RETROGRADES, URETEROSCOPY, LASER HOLMIUM LITHOTRIPSY URETER(S), INSERT STENT;  Surgeon: Artemio Valenzuela MD;  Location: [facility] OR     EXTRACORPOREAL SHOCK WAVE LITHOTRIPSY (ESWL) Left 9/8/2016    Procedure: EXTRACORPOREAL SHOCK WAVE LITHOTRIPSY (ESWL);  Surgeon: Artemio Valenzuela MD;  Location: [facility] OR     RECESSION GASTROCNEMIUS Right 2/1/2016    Procedure: RECESSION GASTROCNEMIUS;  Surgeon: Rachelle Manriquez DPM, Pod;  Location: [facility] OR     Current Outpatient Prescriptions   Medication Sig Dispense Refill     amLODIPine (NORVASC) 5 MG tablet TAKE 1 TABLET BY MOUTH EVERY DAY 90 tablet 0     atorvastatin (LIPITOR) 40 MG tablet TAKE 1 TABLET BY MOUTH EVERY DAY 90 tablet 2     blood glucose (NO BRAND SPECIFIED) lancets standard Use to test blood sugar 3 times daily or as directed. 300 each 3     blood glucose monitoring (NO BRAND SPECIFIED) meter device kit Use to test blood sugar 3 times daily or as directed. 1 kit 0     blood glucose monitoring (NO BRAND SPECIFIED) test strip Use to test blood sugars 3 times daily or as directed 300 strip 3     Cholecalciferol (VITAMIN D3 PO) Take 1,000 Units by mouth daily        CIALIS 5 MG tablet TAKE 1 TABLET BY MOUTH EVERY DAY AS NEEDED 30 tablet 0     CIALIS 5 MG tablet TAKE 1 TABLET BY MOUTH EVERY  "DAY AS NEEDED 30 tablet 0     Co-Enzyme Q10 100 MG CAPS Take 100 mg by mouth daily        Continuous Blood Gluc  (DEXCOM G6 ) LESLY 1 each as needed (use to receive continous blood glucose data) 1 Device 0     Continuous Blood Gluc  (FREESTYLE LUCERO READER) LESLY 1 Device as needed 1 Device 1     Continuous Blood Gluc Sensor (DEXCOM G6 SENSOR) MISC 1 each as needed (Use one sensor every 10 days) 1 each 13     Continuous Blood Gluc Transmit (DEXCOM G6 TRANSMITTER) MISC 1 Device every 3 months 1 each 4     continuous blood glucose monitoring (FREESTYLE LUCERO) sensor For use with Freestyle Lucero Flash  for continuous monitioring of blood glucose levels. Replace sensor every 7-10 days. 4 each 3     gabapentin (NEURONTIN) 100 MG capsule Take 300 mg by mouth At Bedtime       HUMALOG KWIKPEN 100 UNIT/ML soln INJECT 35 UNITS SUB-Q THREE TIMES DAILY 30 mL 3     insulin degludec (TRESIBA) 100 UNIT/ML pen Inject 44 Units Subcutaneous daily 30 mL 3     insulin lispro (HUMALOG KWIKPEN) 100 UNIT/ML injection Inject 35 units sq tid.  Dispense 90-day supply or as allowed by insurance. 30 mL 3     Insulin Pen Needle (PEN NEEDLES) 31G X 5 MM MISC 1 Device 5 times daily , TWICE DAILY WITH LANTUS AND 3 TIMES A DAY PRN WITH NOVOLOG FLEXPEN 200 each 11     Insulin Syringe-Needle U-100 (BD INSULIN SYRINGE ULTRAFINE) 30G X 1/2\" 0.3 ML MISC 1 Syringe daily. Use one syringe  daily or as directed. 100 each prn     ketoconazole (NIZORAL) 2 % external shampoo APPLY TO THE AFFECTED AREA AND WASH OFF AFTER 5 MINUTES. 120 mL .     lisinopril (PRINIVIL/ZESTRIL) 20 MG tablet Take 1 tablet (20 mg) by mouth daily 90 tablet 0     metFORMIN (GLUCOPHAGE-XR) 500 MG 24 hr tablet TAKE 2 TABLETS BY MOUTH TWICE DAILY WITH MEALS 360 tablet 0     Multiple Vitamins-Minerals (MULTIVITAMIN PO) Take 1 tablet by mouth daily        Omega-3 Fatty Acids (OMEGA-3 FISH OIL PO) Take 2 g by mouth daily        Semaglutide (OZEMPIC) 1 MG/DOSE " "SOPN Inject 1 mg Subcutaneous once a week 1 pen 4     tadalafil (CIALIS) 5 MG tablet TAKE 1 TABLET BY MOUTH EVERY DAY AS NEEDED 30 tablet 1       ROS:    A 10-point review of systems was performed.  It is positive for that noted in the HPI and as seen below.  All other systems found to be negative.     Numbness in feet?  yes   Calf pain with walking? no  Recent foot/ankle injury? no  Weight change  over past 12 months? 16# gain  Self perception as overweight? yes  Recent flu-like symptoms? no  Joint pain other than feet ? Shoulder and hip pain    EXAM:    Vitals: /78 (BP Location: Right arm, Patient Position: Chair, Cuff Size: Adult Large)  Ht 5' 10.5\" (1.791 m)  Wt 250 lb (113.4 kg)  BMI 35.36 kg/m2  BMI: Body mass index is 35.36 kg/(m^2).  Height: 5' 10.5\"    Constitutional/ general:  Pt is in no apparent distress, appears well-nourished.  Cooperative with history and physical exam.     Diabetic Foot Exam (details below):      Vascular:  Pedal pulses are palpable bilaterally for both the DP and PT arteries.  CFT < 3 sec.  No edema.  Pedal hair growth noted.     Neuro:  Alert and oriented x 3. Coordinated gait.  Light touch sensation is intact to the L4, L5, S1 distributions. No obvious deficits.  No evidence of neurological-based weakness, spasticity, or contracture in the lower extremities.     Protective sensation is diminished bilateral foot per [place] Monofilament testing.    Derm: Normal texture and turgor.  No erythema, ecchymosis, or cyanosis.  No open lesions.  There is a 1cm round superificial lesion on his right shin. It looks like an excoriation.    Musculoskeletal:    Lower extremity muscle strength is normal.  Patient is ambulatory without an assistive device or brace .  Left hallux amputation; partial right hallux amputation.      Valentino Molina DPM, CLYDE, MS    [place] [facility]            "

## 2018-12-06 DIAGNOSIS — E11.49 DIABETES MELLITUS TYPE 2 WITH NEUROLOGICAL MANIFESTATIONS (H): ICD-10-CM

## 2018-12-06 DIAGNOSIS — E11.42 TYPE 2 DIABETES MELLITUS WITH PERIPHERAL NEUROPATHY (H): ICD-10-CM

## 2018-12-07 RX ORDER — SEMAGLUTIDE 1.34 MG/ML
INJECTION, SOLUTION SUBCUTANEOUS
Qty: 12 PEN | Refills: 0 | Status: SHIPPED | OUTPATIENT
Start: 2018-12-07 | End: 2019-03-29

## 2018-12-07 RX ORDER — METFORMIN HCL 500 MG
TABLET, EXTENDED RELEASE 24 HR ORAL
Qty: 360 TABLET | Refills: 0 | Status: SHIPPED | OUTPATIENT
Start: 2018-12-07 | End: 2019-03-18

## 2018-12-10 ENCOUNTER — DOCUMENTATION ONLY (OUTPATIENT)
Dept: SLEEP MEDICINE | Facility: CLINIC | Age: 58
End: 2018-12-10
Payer: COMMERCIAL

## 2018-12-10 ENCOUNTER — OFFICE VISIT (OUTPATIENT)
Dept: PODIATRY | Facility: CLINIC | Age: 58
End: 2018-12-10
Payer: COMMERCIAL

## 2018-12-10 VITALS
DIASTOLIC BLOOD PRESSURE: 64 MMHG | SYSTOLIC BLOOD PRESSURE: 122 MMHG | WEIGHT: 250 LBS | BODY MASS INDEX: 35 KG/M2 | HEIGHT: 71 IN

## 2018-12-10 DIAGNOSIS — G47.33 OSA (OBSTRUCTIVE SLEEP APNEA): Primary | ICD-10-CM

## 2018-12-10 DIAGNOSIS — L98.9 SKIN LESION OF LEFT LEG: Primary | ICD-10-CM

## 2018-12-10 PROCEDURE — 88305 TISSUE EXAM BY PATHOLOGIST: CPT | Performed by: PODIATRIST

## 2018-12-10 PROCEDURE — 11100 HC BIOPSY SKIN/SUBQ/MUC MEM, SINGLE LESION: CPT | Performed by: PODIATRIST

## 2018-12-10 ASSESSMENT — MIFFLIN-ST. JEOR: SCORE: 1978.18

## 2018-12-10 NOTE — PROGRESS NOTES
Patient was offered choice of vendor and chose [facility].  Patient Crispin Rojas was set up at [place] on December 10, 2018. Patient received a Resmed AirSense 10 Auto. Pressures were set at 5-12 cm H2O.   Patient s ramp is 5 cm H2O for Auto and FLEX/EPR is 2.  Patient received a Resmed Mask name: AIRFIT F30  Full Face mask size Medium, heated tubing and heated humidifier.  Patient is not enrolled in the STM Program and does not need to meet compliance. Patient has a follow up on 2/7/2019 with Dr. Evans.    Tyrell Barcenas

## 2018-12-10 NOTE — PATIENT INSTRUCTIONS
Thank you for choosing [facility]!    DR. CANDELARIA'S CLINIC LOCATIONS     MONDAY - [place] WEDNESDAY - [place]   [address] [place] [address] [address] [address]   [phone] / [phone] / [phone]       THURSDAY - [place] SCHEDULE SURGERY: [phone]   [address] APPOINTMENTS: [phone]   [place] [address] BILLING QUESTIONS: [phone] / [phone]       SIGNS OF INFECTION  expanding redness around the wound   yellow or greenish-colored pus or cloudy wound drainage   red streaking spreading from the wound   increased swelling, tenderness, or pain around the wound   fever  *If you notice any of these signs of infection, call us right away!    Follow up in 2 weeks either in clinic or mychart if things are going well.        FYI:  BODY MASS INDEX (BMI)  Many things can cause foot and ankle problems. Foot structure, activity level, foot mechanics and injuries are common causes of pain. One very important issue that often goes unmentioned, is body weight. Extra weight can cause increased stress on muscles, ligaments, bones and tendons. Sometimes just a few extra pounds is all it takes to put one over her/his threshold. Without reducing that stress, it can be difficult to alleviate pain. Some people are uncomfortable addressing this issue, but we feel it is important for you to think about it. As Foot & Ankle specialists, our job is addressing the lower extremity problem and possible causes. Regarding extra body weight, we encourage patients to discuss diet and weight management plans with their primary care doctors. It is this team approach that gives you the best opportunity for pain relief and getting you back on your feet.

## 2018-12-10 NOTE — PROGRESS NOTES
[facility]    Patient returns for a punch biopsy of a non healing lesion, anterior right leg.  Please refer to the clinic note from 12/3/18.      Procedure:    The punch biopsy procedure was discussed with the pt.  Verbal and written consent obtained.  0.5cc 2% Lidocaine plain injected directly below the lesion.  The area was prepped and draped in a sterile fashion.  Next a 3mm punch biopsy was obtained from the central aspect of the lesion. he tolerated the procedure well.  A sterile dressing was applied to the wound.      Pt to remove dressing tomorrow and check for increasing redness, drainage, malodor, purulence.  He is to then apply a bandaid.  He is instructed to call if there is concern.    I told him that I will call him or MyChart him with the pathology results. Follow up in 1-2 weeks for check of the wound.     Valentino Molina DPM, FACFAS, MS    [facility]

## 2018-12-10 NOTE — LETTER
12/10/2018         RE: Crispin Rojas  [address] [address] [facility] [address]        Dear Colleague,    Thank you for referring your patient, Crispin Rojas, to the [facility]. Please see a copy of my visit note below.    [facility]    Patient returns for a punch biopsy of a non healing lesion, anterior right leg.  Please refer to the clinic note from 12/3/18.      Procedure:    The punch biopsy procedure was discussed with the pt.  Verbal and written consent obtained.  0.5cc 2% Lidocaine plain injected directly below the lesion.  The area was prepped and draped in a sterile fashion.  Next a 3mm punch biopsy was obtained from the central aspect of the lesion. he tolerated the procedure well.  A sterile dressing was applied to the wound.      Pt to remove dressing tomorrow and check for increasing redness, drainage, malodor, purulence.  He is to then apply a bandaid.  He is instructed to call if there is concern.    I told him that I will call him or MyChart him with the pathology results. Follow up in 1-2 weeks for check of the wound.     Valentino Molina DPM, FACFAS, MS    [facility]le [facility]      Again, thank you for allowing me to participate in the care of your patient.        Sincerely,        Valentino Molina DPM

## 2018-12-12 LAB — COPATH REPORT: NORMAL

## 2018-12-13 ENCOUNTER — DOCUMENTATION ONLY (OUTPATIENT)
Dept: SLEEP MEDICINE | Facility: CLINIC | Age: 58
End: 2018-12-13

## 2018-12-13 DIAGNOSIS — G47.33 OSA (OBSTRUCTIVE SLEEP APNEA): ICD-10-CM

## 2018-12-13 NOTE — PROGRESS NOTES
3 DAY STM VISIT    Diagnostic AHI:    HST AHI (Non-PAT): 34.1        Patient contacted for 3 day STM visit  Subjective measures:  He has not yet started using as he did not  any distilled  water yet.  He is planning to start by this weekend.      Device type:   PAP Device: Auto-CPAP ()     PAP settings from order::  CPAP min : 5 cm  H20       CPAP max 12 cm  H20       Mask type:      Mask Interface: Full Face Mask         Assessment: No usage reporting but has a profile in [employer].  Action plan: Pt to have f/u 14 day STM visit.  Patient has a follow up visit scheduled:   yes within 31-90 days of set up.

## 2018-12-21 ENCOUNTER — ALLIED HEALTH/NURSE VISIT (OUTPATIENT)
Dept: NURSING | Facility: CLINIC | Age: 58
End: 2018-12-21

## 2018-12-21 VITALS — WEIGHT: 250.56 LBS | BODY MASS INDEX: 35.44 KG/M2

## 2018-12-21 DIAGNOSIS — E66.01 MORBID OBESITY, UNSPECIFIED OBESITY TYPE (H): Primary | ICD-10-CM

## 2018-12-21 DIAGNOSIS — E11.42 TYPE 2 DIABETES MELLITUS WITH PERIPHERAL NEUROPATHY (H): ICD-10-CM

## 2018-12-21 PROCEDURE — 99207 ZZC HEALTH COACHING, NO CHARGE: CPT

## 2018-12-21 NOTE — PROGRESS NOTES
December 21, 2018    Health Coaching Progress Note    Patient Name: Crispin Rojas Date: December 21, 2018      Session Length: 30      DATA    PRM Master Survey Scores Reviewed: Yes    Core Healthy Days Survey:         ROCHELLE Score (Last Two) 6/21/2018   ROCHELLE Raw Score 30   Activation Score 56   ROCHELLE Level 3       PHQ-2 Score 6/21/2018 3/20/2018   PHQ-2 Total Score (Adult) - Positive if 3 or more points; Administer PHQ-9 if positive 0 0   MyC PHQ-2 Score - 0       PHQ-9 SCORE 3/20/2018 5/9/2018   PHQ-9 Total Score MyChart Incomplete -   PHQ-9 Total Score - 0       Treatment Objective(s) Addressed in This Session:  Target Behavior(s): diet/weight loss and disease management/lifestyle changes of working on getting back to old healthy habits-being more aware of dietary choices by reducing carb intake, adding in more fresh foods and protein, trying to find ways to be more active with busy schedule-walking more, returning to gym ([employer]) for workouts 4-5 days a week, getting more sleep each night/tries to get in 8 hours, managing diabetes well, losing weight and maintaining, returning equipment for at home sleep study as directed, accessing resources and scheduling/attending appointments as needed-appt with diabetes ed, Dr. Jessica Phillips for annual physical      Current Stressors / Issues:  Still searching for work-hasn't been as good with his activity level, not in a set routine right now, got a new sleep machine/not sleeping as well, A1C up slightly to 7.6 from 7.5, working with Diabetes Ed for other diabetes management options, finding time to be active, being more disciplined with food choices, not getting enough sleep, unexplained spikes in blood sugar, ongoing shoulder pain-arthritis/limits mobility, loves competitive sports (golf)/activities but hasn't been able to participate due to time constraints/injuries, wife is a good cook-eats too well sometimes, started gaining weight with insulin use, wants  to lose weight and maintain it, managing diabetes well/avoiding other medications  What Patient Does Well:   1) Patient has had success with lifestyle changes in the past and feels he knows what he needs to do, but just struggles to find time to fit everything into his routine  Previous Successes:   1) Patient has regained the weight he lost previously and is back to his starting weight-hoping to get back on track with his exercise  Areas in Need of Improvement:   1) Maintaining a regular exercise routine  2) Making healthier dietary choices-more fresh foods, protein-cutting back on carbs  3) Losing weight and keeping it off  4) Managing shoulder pain/regaining strength  Barriers to Change:   1) Shoulder injury  2) Bad cycle of losing a lot of weight and then gaining it all back  3) Still looking for a new job  Reasons for Change:   1) Lose weight  2) Continue to manage diabetes  3) Figure out something with his shoulder pain so he can play golf again  Plan/Goal for the Next 4 Weeks:   Goals Addressed as of 12/21/2018 at 1:03 PM                 Today      Appointments (pt-stated)   20%    Added 12/21/18 by Juan M Oshea     My Goal: I will attend upcoming scheduled annual physical with Dr. Lopez    What I need to meet my goal: Transportation/Remember to go    I plan to meet my goal by this date: Next Coaching Session          Being Active (pt-stated)   0%    Added 12/21/18 by Juan M Oshea     My Goal: I will start a regular exercise routine at [employer] 4-5 days a week    What I need to meet my goal: Gym membership/regular routine    I plan to meet my goal by this date: Next Coaching Session          Healthy Eating (pt-stated)   80%    Added 12/21/18 by Juan M Oshea     My Goal: I will be more aware of my food choices and work on reducing carbohydrate intake, eating more fresh food items and adding in more protein each day    What I need to meet my goal: Awareness/Healthy food choices    I plan to meet my  goal by this date: Next Coaching Session        Sleep (pt-stated)   100%    Added 12/21/18 by Juan M Oshea     My Goal: I will get more sleep each night and try to get closer to 8 hours each night     What I need to meet my goal: Get to bed earlier    I plan to meet my goal by this date: Next Coaching Session            Intervention:  Motivational Interviewing    MI Intervention: Expressed Empathy/Understanding, Supported Autonomy, Collaboration, Evocation, Permission to raise concern or advise, Open-ended questions, Reflections: simple and complex and Change talk (evoked)     Change Talk Expressed by the Patient: Desire to change Ability to change Reasons to change Need to change Committment to change Activation Taking steps    Provider Response to Change Talk: E - Evoked more info from patient about behavior change, A - Affirmed patient's thoughts, decisions, or attempts at behavior change, R - Reflected patient's change talk and S - Summarized patient's change talk statements    Assessment / Progress on Treatment Objective(s) / Homework:    Minimal progress - PREPARATION (Decided to change - considering how); Intervened by negotiating a change plan and determining options / strategies for behavior change, identifying triggers, exploring social supports, and working towards setting a date to begin behavior change  Satisfactory progress - ACTION (Actively working towards change); Intervened by reinforcing change plan / affirming steps taken     Plan: (Homework, other):  Patient was encouraged to continue to seek condition-related information and education, as well as schedule a follow up appointment with the Health  in 4 weeks. Patient has set self-identified goals and will monitor progress until the next appointment.  Scheduled our next coaching appointment for Monday January 14th at Noon.  Juan M [facility]    12/21/2018    1:05 PM

## 2018-12-21 NOTE — PATIENT INSTRUCTIONS
December 21, 2018    [address] [address]  [phone]  Health Coaching Progress Note    Patient Name: Crispin Rojas Date: December 21, 2018        Plan/Goal for the Next 4 Weeks:   GOAL #1: Be more aware of what you're eating at meals and snacks/work on reducing carbs, eat more fresh food items and add in more protein each day  GOAL #2: Continue to fit in more physical activity each day as able/keep moving  GOAL #3: Keep working on sleep schedule/try to get close to 8 hours each night as able  GOAL #4: Attend upcoming annual physical with Dr. Lopez  GOAL #5: Start regular workout routine at the gym after job is figured out and shoot for 4-5 days per week      Plan: (Homework, other):  Patient was encouraged to continue to seek condition-related information and education, as well as schedule a follow up appointment with the Health  in 4 weeks. Patient has set self-identified goals and will monitor progress until the next appointment.  Scheduled our next coaching appointment for Monday January 14th at Noon.  Juan M Oshea       Resources:

## 2018-12-24 DIAGNOSIS — S98.112D: ICD-10-CM

## 2018-12-24 DIAGNOSIS — E11.42 DIABETIC POLYNEUROPATHY ASSOCIATED WITH TYPE 2 DIABETES MELLITUS (H): Primary | ICD-10-CM

## 2018-12-26 ENCOUNTER — DOCUMENTATION ONLY (OUTPATIENT)
Dept: SLEEP MEDICINE | Facility: CLINIC | Age: 58
End: 2018-12-26

## 2018-12-26 DIAGNOSIS — G47.33 OSA (OBSTRUCTIVE SLEEP APNEA): ICD-10-CM

## 2018-12-26 DIAGNOSIS — L21.9 SEBORRHEIC DERMATITIS: ICD-10-CM

## 2018-12-26 NOTE — PROGRESS NOTES
14 DAY STM VISIT    Diagnostic AHI:    HST AHI (Non-PAT): 34.     Message left for patient to return call     Mask type:    Mask Interface: Full Face Mask       Assessment: Pt not meeting objective benchmarks for compliance .  Pt has not yet started to use his device.      Action plan: waiting for patient to return call.  and pt to have 30 day STM visit.      Device type:   PAP Device: Auto-CPAP ()             Objective measure goal  Compliance   Goal >70%  Leak   Goal < 10% of night in large leak/ 24 lpm    AHI  Goal < 5  Usage  Goal >240

## 2018-12-27 RX ORDER — KETOCONAZOLE 20 MG/ML
SHAMPOO TOPICAL
Qty: 120 ML | Refills: 0 | Status: SHIPPED | OUTPATIENT
Start: 2018-12-27 | End: 2019-01-25

## 2018-12-31 ENCOUNTER — TELEPHONE (OUTPATIENT)
Dept: ENDOCRINOLOGY | Facility: CLINIC | Age: 58
End: 2018-12-31

## 2018-12-31 DIAGNOSIS — E11.52 TYPE 2 DIABETES MELLITUS WITH DIABETIC PERIPHERAL ANGIOPATHY AND GANGRENE, WITH LONG-TERM CURRENT USE OF INSULIN (H): ICD-10-CM

## 2018-12-31 DIAGNOSIS — Z79.4 TYPE 2 DIABETES MELLITUS WITH DIABETIC PERIPHERAL ANGIOPATHY AND GANGRENE, WITH LONG-TERM CURRENT USE OF INSULIN (H): ICD-10-CM

## 2018-12-31 RX ORDER — FLASH GLUCOSE SENSOR
KIT MISCELLANEOUS
Qty: 4 EACH | Refills: 3 | Status: CANCELLED | OUTPATIENT
Start: 2018-12-31

## 2018-12-31 RX ORDER — FLASH GLUCOSE SENSOR
1 KIT MISCELLANEOUS
Qty: 2 EACH | Refills: 11 | Status: SHIPPED | OUTPATIENT
Start: 2018-12-31 | End: 2019-03-29

## 2018-12-31 NOTE — TELEPHONE ENCOUNTER
Pt calls. Requests new order Corwin sensor 14 day.  Pharmacy will not dispense because Rx is written for 10 day sensor.    T'd up.   Keegan Medina RN

## 2018-12-31 NOTE — TELEPHONE ENCOUNTER
Rx for 14-day sensors sent to his pharmacy.  If he also needs the 14-day reader please place that order as well or route back to me.  Otherwise, let patient know Rx has been sent.  Thanks,  Jasmin Arteaga NP  Endocrinology

## 2019-01-07 DIAGNOSIS — G45.8 OTHER SPECIFIED TRANSIENT CEREBRAL ISCHEMIAS: ICD-10-CM

## 2019-01-07 NOTE — TELEPHONE ENCOUNTER
"Requested Prescriptions   Pending Prescriptions Disp Refills     amLODIPine (NORVASC) 5 MG tablet [Pharmacy Med Name: AMLODIPINE BESYLATE 5MG TABLETS] 90 tablet 0    Last Written Prescription Date:  8/6/18  Last Fill Quantity: 90,  # refills: 0   Last Office Visit: 5/9/2018 Jessica  Future Office Visit:    Next 5 appointments (look out 90 days)    Anthony 10, 2019 10:30 AM CST  PHYSICAL with Aden Lopez MD  [address] [address]  [phone]   Jan 22, 2019  8:30 AM CST  Return Visit with SARAH Raymundo CNP  [address] [address]  [phone]          Sig: TAKE 1 TABLET BY MOUTH EVERY DAY    Calcium Channel Blockers Protocol  Passed - 1/7/2019 12:16 PM       Passed - Blood pressure under 140/90 in past 12 months    BP Readings from Last 3 Encounters:   12/10/18 122/64   12/03/18 134/78   11/23/18 136/81                Passed - Recent (12 mo) or future (30 days) visit within the authorizing provider's specialty    Patient had office visit in the last 12 months or has a visit in the next 30 days with authorizing provider or within the authorizing provider's specialty.  See \"Patient Info\" tab in inbasket, or \"Choose Columns\" in Meds & Orders section of the refill encounter.             Passed - Medication is active on med list       Passed - Patient is age 18 or older       Passed - Normal serum creatinine on file in past 12 months    Recent Labs   Lab Test 06/23/18  1005  01/25/16  0704   CR 0.96   < >  --    CREAT  --   --  0.8    < > = values in this interval not displayed.             "

## 2019-01-08 RX ORDER — AMLODIPINE BESYLATE 5 MG/1
TABLET ORAL
Qty: 90 TABLET | Refills: 0 | Status: SHIPPED | OUTPATIENT
Start: 2019-01-08 | End: 2019-06-01

## 2019-01-08 NOTE — TELEPHONE ENCOUNTER
Prescription approved per [facility] Refill Protocol.  Javier Hendrickson RN, BSN

## 2019-01-10 ENCOUNTER — ANCILLARY PROCEDURE (OUTPATIENT)
Dept: GENERAL RADIOLOGY | Facility: CLINIC | Age: 59
End: 2019-01-10
Payer: COMMERCIAL

## 2019-01-10 ENCOUNTER — OFFICE VISIT (OUTPATIENT)
Dept: FAMILY MEDICINE | Facility: CLINIC | Age: 59
End: 2019-01-10
Payer: COMMERCIAL

## 2019-01-10 ENCOUNTER — DOCUMENTATION ONLY (OUTPATIENT)
Dept: SLEEP MEDICINE | Facility: CLINIC | Age: 59
End: 2019-01-10
Payer: COMMERCIAL

## 2019-01-10 VITALS
OXYGEN SATURATION: 96 % | TEMPERATURE: 97.5 F | DIASTOLIC BLOOD PRESSURE: 82 MMHG | SYSTOLIC BLOOD PRESSURE: 138 MMHG | BODY MASS INDEX: 34.43 KG/M2 | HEIGHT: 71 IN | WEIGHT: 245.9 LBS | HEART RATE: 64 BPM

## 2019-01-10 DIAGNOSIS — E11.11 TYPE 2 DIABETES MELLITUS WITH KETOACIDOSIS AND COMA, WITH LONG-TERM CURRENT USE OF INSULIN (H): Primary | ICD-10-CM

## 2019-01-10 DIAGNOSIS — R94.31 ABNORMAL ELECTROCARDIOGRAM: ICD-10-CM

## 2019-01-10 DIAGNOSIS — R21 RASH OF FACE: ICD-10-CM

## 2019-01-10 DIAGNOSIS — Z79.4 TYPE 2 DIABETES MELLITUS WITH KETOACIDOSIS AND COMA, WITH LONG-TERM CURRENT USE OF INSULIN (H): Primary | ICD-10-CM

## 2019-01-10 DIAGNOSIS — M25.512 LEFT SHOULDER PAIN, UNSPECIFIED CHRONICITY: ICD-10-CM

## 2019-01-10 DIAGNOSIS — M25.552 HIP PAIN, LEFT: ICD-10-CM

## 2019-01-10 LAB — HBA1C MFR BLD: 7.5 % (ref 0–5.6)

## 2019-01-10 PROCEDURE — 99213 OFFICE O/P EST LOW 20 MIN: CPT | Mod: 25 | Performed by: FAMILY MEDICINE

## 2019-01-10 PROCEDURE — 73523 X-RAY EXAM HIPS BI 5/> VIEWS: CPT

## 2019-01-10 PROCEDURE — 80061 LIPID PANEL: CPT | Performed by: FAMILY MEDICINE

## 2019-01-10 PROCEDURE — 80053 COMPREHEN METABOLIC PANEL: CPT | Performed by: FAMILY MEDICINE

## 2019-01-10 PROCEDURE — 99396 PREV VISIT EST AGE 40-64: CPT | Performed by: FAMILY MEDICINE

## 2019-01-10 PROCEDURE — 82043 UR ALBUMIN QUANTITATIVE: CPT | Performed by: FAMILY MEDICINE

## 2019-01-10 PROCEDURE — 36415 COLL VENOUS BLD VENIPUNCTURE: CPT | Performed by: FAMILY MEDICINE

## 2019-01-10 PROCEDURE — 73030 X-RAY EXAM OF SHOULDER: CPT | Mod: LT

## 2019-01-10 PROCEDURE — 83036 HEMOGLOBIN GLYCOSYLATED A1C: CPT | Performed by: FAMILY MEDICINE

## 2019-01-10 RX ORDER — KETOCONAZOLE 20 MG/ML
SHAMPOO TOPICAL DAILY PRN
COMMUNITY
Start: 2019-01-10 | End: 2019-04-23

## 2019-01-10 ASSESSMENT — ENCOUNTER SYMPTOMS
JOINT SWELLING: 0
DIZZINESS: 0
SHORTNESS OF BREATH: 0
ABDOMINAL PAIN: 0
HEADACHES: 0
NERVOUS/ANXIOUS: 0
WEAKNESS: 0
SORE THROAT: 0
FREQUENCY: 0
ARTHRALGIAS: 1
DIARRHEA: 0
HEMATOCHEZIA: 0
COUGH: 0
CONSTIPATION: 0
PARESTHESIAS: 0
PALPITATIONS: 0
MYALGIAS: 1
EYE PAIN: 0
FEVER: 0
DYSURIA: 0
CHILLS: 0
HEARTBURN: 1
HEMATURIA: 0
NAUSEA: 1

## 2019-01-10 ASSESSMENT — MIFFLIN-ST. JEOR: SCORE: 1963.56

## 2019-01-10 NOTE — PROGRESS NOTES
30 DAY STM VISIT    Diagnostic AHI:  34.1 HST    Message left for patient to return call.     Assessment: Pt not meeting objective benchmarks for compliance.  Action plan: Waiting for patient to return call and 2 week STM recheck appt scheduled.  Patient has scheduled a follow up visit with Dr. Evans on 2/7/2019.   Device type: Auto-CPAP  PAP settings: CPAP min 5.0 cm  H20     CPAP max 12.0 cm  H20    95th% pressure 4.9 cm  H20   Mask type:  Full Face Mask  Objective measures: 14 day rolling measures      Compliance  0 %      Leak  39.6 lpm  last  upload      AHI 0   last  upload      Average number of minutes 0      Objective measure goal  Compliance   Goal >70%  Leak   Goal < 24 lpm  AHI  Goal < 5  Usage  Goal >240

## 2019-01-10 NOTE — PROGRESS NOTES
SUBJECTIVE:   CC: Crispin Rojas is an 58 year old male who presents for preventative health visit.     Physical   Annual:     Getting at least 3 servings of Calcium per day:  NO    Bi-annual eye exam:  Yes    Dental care twice a year:  Yes    Sleep apnea or symptoms of sleep apnea:  Sleep apnea    Diet:  Diabetic    Frequency of exercise:  None    Taking medications regularly:  Yes    Medication side effects:  None    Additional concerns today:  Yes    PHQ-2 Total Score: 0      Rash on forehead and polyp in his left nostril     May be allergy rection to medicated shampoo or other topical, he has DM that is controlled    Today's PHQ-2 Score:   PHQ-2 ( 1999 Pfizer) 1/10/2019   Q1: Little interest or pleasure in doing things 0   Q2: Feeling down, depressed or hopeless 0   PHQ-2 Score 0   Q1: Little interest or pleasure in doing things Not at all   Q2: Feeling down, depressed or hopeless Not at all   PHQ-2 Score 0       Abuse: Current or Past(Physical, Sexual or Emotional)- No  Do you feel safe in your environment? Yes    Social History     Tobacco Use     Smoking status: Never Smoker     Smokeless tobacco: Never Used   Substance Use Topics     Alcohol use: Yes     Alcohol/week: 0.0 oz     Comment: rare---red wine 2x per week     Alcohol Use 1/10/2019   If you drink alcohol do you typically have greater than 3 drinks per day OR greater than 7 drinks per week? No       Last PSA:   PSA   Date Value Ref Range Status   12/05/2016 0.31 0 - 4 ug/L Final     Comment:     Assay Method:  Chemiluminescence using Siemens Vista analyzer       Reviewed orders with patient. Reviewed health maintenance and updated orders accordingly - No  BP Readings from Last 3 Encounters:   01/10/19 169/80   12/10/18 122/64   12/03/18 134/78    Wt Readings from Last 3 Encounters:   01/10/19 111.5 kg (245 lb 14.4 oz)   12/21/18 113.7 kg (250 lb 9 oz)   12/10/18 113.4 kg (250 lb)                    Reviewed and updated as needed this visit by  "clinical staff  Tobacco  Allergies  Meds  Med Hx  Surg Hx  Fam Hx  Soc Hx        Reviewed and updated as needed this visit by Provider            Review of Systems   Constitutional: Negative for chills and fever.   HENT: Negative for congestion, ear pain, hearing loss and sore throat.    Eyes: Positive for visual disturbance. Negative for pain.   Respiratory: Negative for cough and shortness of breath.    Cardiovascular: Negative for chest pain, palpitations and peripheral edema.   Gastrointestinal: Positive for heartburn and nausea. Negative for abdominal pain, constipation, diarrhea and hematochezia.   Genitourinary: Positive for impotence. Negative for discharge, dysuria, frequency, genital sores, hematuria and urgency.   Musculoskeletal: Positive for arthralgias and myalgias. Negative for joint swelling.   Skin: Positive for rash.   Neurological: Negative for dizziness, weakness, headaches and paresthesias.   Psychiatric/Behavioral: Negative for mood changes. The patient is not nervous/anxious.      CONSTITUTIONAL: NEGATIVE for fever, chills, change in weight  INTEGUMENTARY/SKIN: NEGATIVE for worrisome rashes, moles or lesions  EYES: NEGATIVE for vision changes or irritation  ENT: NEGATIVE for ear, mouth and throat problems  RESP: NEGATIVE for significant cough or SOB  CV: NEGATIVE for chest pain, palpitations or peripheral edema  GI: NEGATIVE for nausea, abdominal pain, heartburn, or change in bowel habits   male: negative for dysuria, hematuria, decreased urinary stream, erectile dysfunction, urethral discharge  MUSCULOSKELETAL: NEGATIVE for significant arthralgias or myalgia  NEURO: NEGATIVE for weakness, dizziness or paresthesias  PSYCHIATRIC: NEGATIVE for changes in mood or affect    OBJECTIVE:   /80 (BP Location: Right arm, Patient Position: Chair, Cuff Size: Adult Large)   Pulse 64   Temp 97.5  F (36.4  C) (Oral)   Ht 1.797 m (5' 10.75\")   Wt 111.5 kg (245 lb 14.4 oz)   SpO2 96%   BMI " 34.54 kg/m      Physical Exam  GENERAL: healthy, alert and no distress  EYES: Eyes grossly normal to inspection, PERRL and conjunctivae and sclerae normal  HENT: ear canals and TM's normal, nose and mouth without ulcers or lesions  NECK: no adenopathy, no asymmetry, masses, or scars and thyroid normal to palpation  RESP: lungs clear to auscultation - no rales, rhonchi or wheezes  CV: regular rate and rhythm, normal S1 S2, no S3 or S4, no murmur, click or rub, no peripheral edema and peripheral pulses strong  ABDOMEN: soft, nontender, no hepatosplenomegaly, no masses and bowel sounds normal  MS: no gross musculoskeletal defects noted, no edema  SKIN: no suspicious lesions or rashes  NEURO: Normal strength and tone, mentation intact and speech normal  PSYCH: mentation appears normal, affect normal/bright    Diagnostic Test Results:  Shoulder pain with no  injury for 52 weeks.    Mechanism is unknown, torsional strain aggravates it     Repetative stress activity is none     This is not  the dominant arm  Xray is negative     He has pain in both hips, no injury, right hip starting to radiate medially, xray minimal djd, he's had longtime obesity     (E11.11,  Z79.4) Type 2 diabetes mellitus with ketoacidosis and coma, with long-term current use of insulin (H)  (primary encounter diagnosis)  Comment:   Plan: Comprehensive metabolic panel, Lipid panel         reflex to direct LDL Fasting, Hemoglobin A1c,         Albumin Random Urine Quantitative with Creat         Ratio            (M25.552) Hip pain, left  Comment:   Plan: XR Pelvis and Hip Bilateral 2 Views, ORTHO          REFERRAL, OFFICE/OUTPT VISIT,EST,LEVL        III            (M25.512) Left shoulder pain, unspecified chronicity  Comment:   Plan: XR Shoulder Left G/E 3 Views, ORTHO          REFERRAL, OFFICE/OUTPT VISIT,EST,LEVL III            (R21) Rash of face  Comment: topical allergy stop   Plan: ketoconazole (NIZORAL) 2 % external shampoo,        "  OFFICE/OUTPT VISIT,EST,LEVL III            (R94.31) Abnormal electrocardiogram  Comment:   Plan: Echocardiogram Complete with Bubble Study,         CARDIOLOGY EVAL ADULT REFERRAL        [facility]:get anatomic evalutation echo and Cardiology , DM increases his asvd risk        Painful arc syndrome is not present in the shoulder     No associated neck pain. Moderate trapezius discomfort is noted with  shoulder shrug and axillary nerve is intact. Full neck ROM    See Orthopedic consult .        ASSESSMENT/PLAN:   He's been seen at the vacular clinic by; Dr. Mccarthy  Needs echo and Cardiology evaluation     COUNSELING:   Reviewed preventive health counseling, as reflected in patient instructions       Regular exercise       Healthy diet/nutrition       Vision screening    BP Readings from Last 1 Encounters:   01/10/19 169/80     Estimated body mass index is 34.54 kg/m  as calculated from the following:    Height as of this encounter: 1.797 m (5' 10.75\").    Weight as of this encounter: 111.5 kg (245 lb 14.4 oz).           reports that  has never smoked. he has never used smokeless tobacco.      Counseling Resources:  ATP IV Guidelines  Pooled Cohorts Equation Calculator  FRAX Risk Assessment  ICSI Preventive Guidelines  Dietary Guidelines for Americans, 2010  USDA's MyPlate  ASA Prophylaxis  Lung CA Screening    Aden Lopez MD  [facility]  "

## 2019-01-11 LAB
ALBUMIN SERPL-MCNC: 4 G/DL (ref 3.4–5)
ALP SERPL-CCNC: 47 U/L (ref 40–150)
ALT SERPL W P-5'-P-CCNC: 82 U/L (ref 0–70)
ANION GAP SERPL CALCULATED.3IONS-SCNC: 10 MMOL/L (ref 3–14)
AST SERPL W P-5'-P-CCNC: 67 U/L (ref 0–45)
BILIRUB SERPL-MCNC: 0.8 MG/DL (ref 0.2–1.3)
BUN SERPL-MCNC: 12 MG/DL (ref 7–30)
CALCIUM SERPL-MCNC: 8.8 MG/DL (ref 8.5–10.1)
CHLORIDE SERPL-SCNC: 110 MMOL/L (ref 94–109)
CHOLEST SERPL-MCNC: 96 MG/DL
CO2 SERPL-SCNC: 23 MMOL/L (ref 20–32)
CREAT SERPL-MCNC: 0.89 MG/DL (ref 0.66–1.25)
GFR SERPL CREATININE-BSD FRML MDRD: >90 ML/MIN/{1.73_M2}
GLUCOSE SERPL-MCNC: 100 MG/DL (ref 70–99)
HDLC SERPL-MCNC: 37 MG/DL
LDLC SERPL CALC-MCNC: 41 MG/DL
NONHDLC SERPL-MCNC: 59 MG/DL
POTASSIUM SERPL-SCNC: 4.2 MMOL/L (ref 3.4–5.3)
PROT SERPL-MCNC: 7.7 G/DL (ref 6.8–8.8)
SODIUM SERPL-SCNC: 143 MMOL/L (ref 133–144)
TRIGL SERPL-MCNC: 89 MG/DL

## 2019-01-11 NOTE — PROGRESS NOTES
ASSESSMENT & PLAN  Patient Instructions     1. Greater trochanteric bursitis of both hips    2. Impingement syndrome of left shoulder    3. Rotator cuff arthropathy, left    4. Type 2 diabetes mellitus with peripheral neuropathy (H)    5. Other specified transient cerebral ischemias      -Patient has bilateral hip pain due to greater trochanteric bursitis and left shoulder pain due to impingement syndrome and chronic tendinopathy  -Patient mainly has symptoms at night that wake him up from sleep.  -Previous MRI of the shoulder was reviewed and discussed.  Previous visit with Dr. Lozano was also reviewed and discussed with patient.  -Patient has diabetes and history of stroke for which he is taking high-dose aspirin.  Patient has history of severe reaction to cortisone injections causing blood sugars to rise into the 400s and so he cannot tolerate cortisone injections.  Patient cannot take oral anti-inflammatories due to high risk for bleeding but since he is on aspirin.  -Previous notes from Neurology and PCP was reviewed including lab results.  -Patient will try 4% lidocaine patches which she can purchase over-the-counter at the pharmacy.  If the patches do not relieve his pain, patient will call the office and we will try cyclobenzaprine 5-10 mg at night.  -Patient will start formal physical therapy and home exercise program for his bilateral hip pain.   -patient will continue with his home program for his left shoulder.  -Call direct clinic number [[phone]] at any time with questions or concerns.    Albert Yeo MD [facility]  [facility] [place]          -----    SUBJECTIVE  Crispin Rojas is a/an 58 year old right handed male who is seen in consultation at the request of  Aden Lopez M.D. for evaluation of left hip and left shoulder pain. The patient is seen by themselves.    BILATERAL HIPS  Onset: 15 month(s) ago. Reports insidious onset without acute  precipitating event.  Location of Pain: bilateral lateral aspect of hips  Rating of Pain at worst: 7/10  Rating of Pain Currently: 2/10  Worsened by: pain worse at night, prolonged sitting in a car  Better with: rest/activity avoidance  Treatments tried: rest/activity avoidance and previous imaging (xray 1/10/19)  Quality: aching  Associated symptoms: no distal numbness or tingling; denies swelling or warmth  Orthopedic history: YES - low back pain with left sided sciatica Date: about 10 years ago  Relevant surgical history: NO  Social history: social history: patient not currently working    LEFT SHOULDER  Onset: 5 years(s) ago. Reports insidious onset without acute precipitating event.  Location of Pain: left anterior aspect of shoulder  Rating of Pain at worst: 10/10  Rating of Pain Currently: 0/10  Worsened by: pain worse at night, pain worse while playing golf  Better with: rest/activity avoidance  Treatments tried: rest/activity avoidance, home exercises, physical therapy (9 visits), previous imaging (MRI 1/29/18 and xray 1/10/19), corticosteroid injection (most recent date: about 4 years) that provided  2 week(s) of relief and tiger balm  Associated symptoms: no distal numbness or tingling; denies swelling or warmth  Orthopedic history: NO  Relevant surgical history: NO      Past Medical History:   Diagnosis Date     Cerebral infarction (H)      Chronic infection      H/O heartburn      History of heartburn      Hypertension      Numbness and tingling      Obesity      GILA (obstructive sleep apnea) 10/22/2018     Renal stone      Type II or unspecified type diabetes mellitus without mention of complication, not stated as uncontrolled      Social History     Socioeconomic History     Marital status:      Spouse name: Sydney     Number of children: 2     Years of education: Not on file     Highest education level: Not on file   Social Needs     Financial resource strain: Not on file     Food insecurity -  "worry: Not on file     Food insecurity - inability: Not on file     Transportation needs - medical: Not on file     Transportation needs - non-medical: Not on file   Occupational History     Occupation: marketing     Employer: [employer]     Comment: [employer]   Tobacco Use     Smoking status: Never Smoker     Smokeless tobacco: Never Used   Substance and Sexual Activity     Alcohol use: Yes     Alcohol/week: 0.0 oz     Comment: rare---red wine 2x per week     Drug use: No     Sexual activity: Yes     Partners: Female   Other Topics Concern     Parent/sibling w/ CABG, MI or angioplasty before 65F 55M? No   Social History Narrative     Not on file         Patient's past medical, surgical, social, and family histories were reviewed today and no changes are noted.    REVIEW OF SYSTEMS:  10 point ROS is negative other than symptoms noted above in HPI, Past Medical History or as stated below  Constitutional: NEGATIVE for fever, chills, change in weight  Skin: NEGATIVE for worrisome rashes, moles or lesions  GI/: NEGATIVE for bowel or bladder changes  Neuro: NEGATIVE for weakness, dizziness or paresthesias    OBJECTIVE:  /72   Ht 1.797 m (5' 10.75\")   Wt 111.1 kg (245 lb)   BMI 34.41 kg/m     General: healthy, alert and in no distress  HEENT: no scleral icterus or conjunctival erythema  Skin: no suspicious lesions or rash. No jaundice.  CV: no pedal edema  Resp: normal respiratory effort without conversational dyspnea   Psych: normal mood and affect  Gait: normal steady gait with appropriate coordination and balance  Neuro: Normal light sensory exam of lower extremity  MSK:  BILATERAL HIP  Inspection:    No obvious deformity or asymmetry, level pelvis  Palpation:    Tender about the greater trochanteric region. Otherwise all other landmarks are nontender.  Active Range of Motion:     Flexion limited by tightness, IR within normal limits, ER  within normal limits  Strength:    Flexion 5/5, adduction 5/5, " abduction 5/5  Special Tests:    Positive: none     Negative: Logroll, resisted gluteus medius provocation, NICHOLE, anterior impingement (FADIR), posterior impingement (EX/AB/ER), Edith's, SI provocative testing    LEFT SHOULDER  Inspection:    no swelling, bruising, discoloration, or obvious deformity or asymmetry  Palpation:    Tender about the anterior capsule and supraspinatus insertion. Remainder of bony and tendinous landmarks are nontender.    Crepitus is Absent  Active Range of Motion:     Abduction normal0 / FF normal0 / ER normal0 / IR L3.  Opposite arm abduction/flexion/ER within normal limits, IR normal    Scapular dyskinesis absent  Strength:    Scapular plane abduction 5-/5 / ER 5-/5 / IR 5/5 / biceps 5/5 / triceps 5/5  Special Tests:    Positive: Neer's and supraspinatus (empty can)    Negative: Dang', drop arm/painful arc, crossed arm adduction, Nelliston's, Speed's and Yergason's      Independent visualization of the below image:  MRI LEFT SHOULDER WITHOUT CONTRAST  1/29/2018 7:33 AM     HISTORY: Three years of left shoulder pain. Evaluate for rotator cuff  tear.     COMPARISON: Radiographs on 1/12/2018.     TECHNIQUE: Multiplanar MR imaging was performed without contrast.     FINDINGS:  Osseous acromion outlet: There is a type II configuration of the  acromion with no significant lateral or anterior downsloping. There  are mild degenerative changes of the acromioclavicular joint. The  outlet is widely patent. No os acromiale.     Rotator cuff: There is mild increased signal within and thickening of  the distal fibers of the supraspinatus tendon. This does not involve  the surface of the tendon and does not represent a tear. The remaining  rotator cuff structures are intact and unremarkable. No fatty atrophy  of the musculature is seen.      Labrum: No tear or other labral pathology is demonstrated.     Biceps tendon: The biceps tendon is in the bicipital groove. It is  intact and demonstrates normal  signal.     Osseous structures and cartilaginous surfaces: No fracture or osseous  lesion is seen. No abnormal marrow signal intensity is identified. The  cartilage surfaces are well preserved.     Additional findings: No joint effusion is demonstrated. There is no  fluid within the subacromial-subdeltoid bursa. The adjacent soft  tissues are unremarkable.                                                                      IMPRESSION:   1. Mild tendinosis of the supraspinatus. No rotator cuff tear is seen.  2. Mild degenerative changes of the acromioclavicular joint.     KESHAWN DA SILVA MD    No results found for this or any previous visit (from the past 24 hour(s)).  PELVIS WITH BILATERAL HIP 1/10/2019 11:33 AM      HISTORY: Hip pain, left.     COMPARISON: None.     FINDINGS:  Mild bilateral loss of joint space and spurring. There is  no acute fracture. No dislocation. There are no worrisome bony  lesions.                                                                      IMPRESSION:  No acute osseous abnormality demonstrated.     JOHN TATE MD    SHOULDER TWO VIEWS LEFT  1/10/2019 11:34 AM      HISTORY: Left shoulder pain, unspecified chronicity     COMPARISON: January 12, 2018     FINDINGS: Trace spurring. The acromioclavicular and coracoclavicular  distances appear within normal limits. The subacromial space is  maintained. There is no acute fracture or demonstrated dislocation.  There are no worrisome bony lesions.                                                                      IMPRESSION: No acute osseous abnormality demonstrated.     JOHN TATE MD    Patient's conditions were thoroughly discussed during today's visit with greater than 50% of the visit spent counseling the patient with total time spent face-to-face with the patient being 50 minutes.    Albert Yeo MD [facility]

## 2019-01-12 DIAGNOSIS — I10 ESSENTIAL HYPERTENSION: ICD-10-CM

## 2019-01-13 LAB
CREAT UR-MCNC: 240 MG/DL
MICROALBUMIN UR-MCNC: 40 MG/L
MICROALBUMIN/CREAT UR: 16.83 MG/G CR (ref 0–17)

## 2019-01-14 ENCOUNTER — ALLIED HEALTH/NURSE VISIT (OUTPATIENT)
Dept: EDUCATION SERVICES | Facility: CLINIC | Age: 59
End: 2019-01-14
Payer: COMMERCIAL

## 2019-01-14 DIAGNOSIS — E11.42 TYPE 2 DIABETES MELLITUS WITH PERIPHERAL NEUROPATHY (H): Primary | ICD-10-CM

## 2019-01-14 PROCEDURE — G0108 DIAB MANAGE TRN  PER INDIV: HCPCS | Performed by: DIETITIAN, REGISTERED

## 2019-01-14 NOTE — LETTER
"    1/14/2019         RE: Crispin Rojas  [address] [address] [id number]        Dear Colleague,    Thank you for referring your patient, Crispin Rojas, to the [facility]. Please see a copy of my visit note below.    Diabetes Self-Management Education & Support    Diabetes Education Self Management & Training    SUBJECTIVE/OBJECTIVE:     Cultural Influences/Ethnic Background:  American    Last CDE visit 11/21/18. At that time- pt was interested in proceeding with insulin pump therapy (Tandem) along with switch from Corwin to Dexcom. Pt reports today that he has not returned phone calls from any of the reps. \"Is still on the fence\" and wanted to discuss more in detail how these devices work. Pt disappointed that current technology does not \"regulate everything for him\". Is comfortable with injections, but concerned about elevated A1c.     Patient Problem List and Family Medical History reviewed for relevant medical history, current medical status, and diabetes risk factors.    Most Recent A1c Result:    Lab Results   Component Value Date    A1C 7.5 01/10/2019       Continuous Glucose Monitor Interpretation     Currently wearing a 14 day sensor- but does not have the 14 day reader yet (mix up at the pharmacy while recently in [place]).   No BG data available for today's visit.        Healthy Eating  Cultural/Sabianism diet restrictions?: No  Meal planning: None  Meals include: Other  Beverages: Water, Tea  Has patient met with a dietitian in the past?: No    Being Active  Barrier to exercise: None    Monitoring  Blood Glucose Meter: Other  Home Glucose (Sugar) Monitoring: 3-4 times per day  Blood glucose trend: Increasing steadily  Low Glucose Range (mg/dL): 70-90  High Glucose Range (mg/dL): >200  Overall Range (mg/dL): 130-140    Taking Medications  Diabetes Medication(s)     Biguanides Sig    metFORMIN (GLUCOPHAGE-XR) 500 MG 24 hr tablet TAKE 2 TABLETS BY MOUTH TWICE " DAILY WITH MEALS    Insulin Sig    HUMALOG KWIKPEN 100 UNIT/ML soln INJECT 35 UNITS SUB-Q THREE TIMES DAILY    insulin degludec (TRESIBA) 100 UNIT/ML pen Inject 44 Units Subcutaneous daily    insulin lispro (HUMALOG KWIKPEN) 100 UNIT/ML injection Inject 35 units sq tid.  Dispense 90-day supply or as allowed by insurance.    Incretin Mimetic Agents (GLP-1 Receptor Agonists) Sig    OZEMPIC 1 MG/DOSE pen INJECT 1MG UNDER SKIN ONCE WEEKLY          Current Treatments: Insulin Injections, Non-insulin Injectables, Oral Agent (dual therapy)    Problem Solving  Hypoglycemia Frequency: Weekly  Hypoglycemia Treatment: Other food  Patient carries a carbohydrate source: No  Medical alert: No  Severe weather/disaster plan for diabetes management?: No  DKA prevention plan?: No    Hypoglycemia symptoms  Confusion: No  Dizziness or Light-Headedness: No  Headaches: No  Hunger: No  Mood changes: No  Nervousness/Anxiety: No  Sleepiness: No  Speech difficulty: No  Sweats: No  Tremors: Yes    Hypoglycemia Complications  Blackouts: No  Hospitalization: No  Nocturnal hypoglycemia: No  Required assistance: No  Required glucagon injection: No  Seizures: No    Reducing Risks  Has dilated eye exam at least once a year?: Yes  Sees dentist every 6 months?: Yes  Sees podiatrist (foot doctor)?: Yes    Healthy Coping  Informal Support system:: None  Difficulty affording diabetes management supplies?: No  Patient Activation Measure Survey Score:  ROCHELLE Score (Last Two) 6/21/2018   ROCHELLE Raw Score 30   Activation Score 56   ROCHELLE Level 3       Assessment:  Medication and/or insulin dosing is: unable to assess without BG data  Reviewed features of Dexcom G6 and answered questions about Tandem pump therapy with CGM integration. Pt states he is not ready at this time to move forward with obtaining a pump. He may consider the Dexcom G6 due to alerts/ alarms, as he does get concerned about low blood sugars during the night. Reports previous episodes with  symptoms that did awaken him (but no reported hx of hypo unawareness).       INTERVENTION:   Diabetes knowledge and skills assessment:     Patient is knowledgeable in diabetes management concepts related to: Healthy Eating, Being Active, Monitoring, Taking Medication, Problem Solving, Reducing Risks and Healthy Coping    Patient needs further education on the following diabetes management concepts: Monitoring and Taking Medication    Based on learning assessment above, most appropriate setting for further diabetes education would be: Individual setting.    Education provided today on:  AADE Self-Care Behaviors:  Monitoring: personal CGM options - 14 day Freestyle vs 10 day, informed of free upgrade to new 14 day reader  Taking Medication: pros/cons of insulin pump use- reviewed specifically features of Tandem pump with Basal IQ    Pt verbalized understanding of concepts discussed and recommendations provided today.       Education Materials Provided:  No new materials provided today    PLAN:  No changes made today due to no BG data. Pt advised to get the 14 day Corwin reader and activate current sensor.   Endo follow up next week a planned.   Pt may consider pump therapy in the future- will contact CDE if wishes to move forward.     June Walker RD, CDE  Diabetes     Time Spent: 60 minutes  Encounter Type: Individual    Any diabetes medication dose changes were made via the CDE Protocol and Collaborative Practice Agreement with the patient's endocrinology provider. A copy of this encounter was shared with the provider.

## 2019-01-14 NOTE — PROGRESS NOTES
"Diabetes Self-Management Education & Support    Diabetes Education Self Management & Training    SUBJECTIVE/OBJECTIVE:     Cultural Influences/Ethnic Background:  American    Last CDE visit 11/21/18. At that time- pt was interested in proceeding with insulin pump therapy (Tandem) along with switch from Corwin to Dexcom. Pt reports today that he has not returned phone calls from any of the reps. \"Is still on the fence\" and wanted to discuss more in detail how these devices work. Pt disappointed that current technology does not \"regulate everything for him\". Is comfortable with injections, but concerned about elevated A1c.     Patient Problem List and Family Medical History reviewed for relevant medical history, current medical status, and diabetes risk factors.    Most Recent A1c Result:    Lab Results   Component Value Date    A1C 7.5 01/10/2019       Continuous Glucose Monitor Interpretation     Currently wearing a 14 day sensor- but does not have the 14 day reader yet (mix up at the pharmacy while recently in [place]).   No BG data available for today's visit.        Healthy Eating  Cultural/Synagogue diet restrictions?: No  Meal planning: None  Meals include: Other  Beverages: Water, Tea  Has patient met with a dietitian in the past?: No    Being Active  Barrier to exercise: None    Monitoring  Blood Glucose Meter: Other  Home Glucose (Sugar) Monitoring: 3-4 times per day  Blood glucose trend: Increasing steadily  Low Glucose Range (mg/dL): 70-90  High Glucose Range (mg/dL): >200  Overall Range (mg/dL): 130-140    Taking Medications  Diabetes Medication(s)     Biguanides Sig    metFORMIN (GLUCOPHAGE-XR) 500 MG 24 hr tablet TAKE 2 TABLETS BY MOUTH TWICE DAILY WITH MEALS    Insulin Sig    HUMALOG KWIKPEN 100 UNIT/ML soln INJECT 35 UNITS SUB-Q THREE TIMES DAILY    insulin degludec (TRESIBA) 100 UNIT/ML pen Inject 44 Units Subcutaneous daily    insulin lispro (HUMALOG KWIKPEN) 100 UNIT/ML injection Inject 35 units sq tid.  " Dispense 90-day supply or as allowed by insurance.    Incretin Mimetic Agents (GLP-1 Receptor Agonists) Sig    OZEMPIC 1 MG/DOSE pen INJECT 1MG UNDER SKIN ONCE WEEKLY          Current Treatments: Insulin Injections, Non-insulin Injectables, Oral Agent (dual therapy)    Problem Solving  Hypoglycemia Frequency: Weekly  Hypoglycemia Treatment: Other food  Patient carries a carbohydrate source: No  Medical alert: No  Severe weather/disaster plan for diabetes management?: No  DKA prevention plan?: No    Hypoglycemia symptoms  Confusion: No  Dizziness or Light-Headedness: No  Headaches: No  Hunger: No  Mood changes: No  Nervousness/Anxiety: No  Sleepiness: No  Speech difficulty: No  Sweats: No  Tremors: Yes    Hypoglycemia Complications  Blackouts: No  Hospitalization: No  Nocturnal hypoglycemia: No  Required assistance: No  Required glucagon injection: No  Seizures: No    Reducing Risks  Has dilated eye exam at least once a year?: Yes  Sees dentist every 6 months?: Yes  Sees podiatrist (foot doctor)?: Yes    Healthy Coping  Informal Support system:: None  Difficulty affording diabetes management supplies?: No  Patient Activation Measure Survey Score:  ROCHELLE Score (Last Two) 6/21/2018   ROCHELLE Raw Score 30   Activation Score 56   ROCHELLE Level 3       Assessment:  Medication and/or insulin dosing is: unable to assess without BG data  Reviewed features of Dexcom G6 and answered questions about Tandem pump therapy with CGM integration. Pt states he is not ready at this time to move forward with obtaining a pump. He may consider the Dexcom G6 due to alerts/ alarms, as he does get concerned about low blood sugars during the night. Reports previous episodes with symptoms that did awaken him (but no reported hx of hypo unawareness).       INTERVENTION:   Diabetes knowledge and skills assessment:     Patient is knowledgeable in diabetes management concepts related to: Healthy Eating, Being Active, Monitoring, Taking Medication, Problem  Solving, Reducing Risks and Healthy Coping    Patient needs further education on the following diabetes management concepts: Monitoring and Taking Medication    Based on learning assessment above, most appropriate setting for further diabetes education would be: Individual setting.    Education provided today on:  AADE Self-Care Behaviors:  Monitoring: personal CGM options - 14 day Freestyle vs 10 day, informed of free upgrade to new 14 day reader  Taking Medication: pros/cons of insulin pump use- reviewed specifically features of Tandem pump with Basal IQ    Pt verbalized understanding of concepts discussed and recommendations provided today.       Education Materials Provided:  No new materials provided today    PLAN:  No changes made today due to no BG data. Pt advised to get the 14 day Corwin reader and activate current sensor.   Endo follow up next week a planned.   Pt may consider pump therapy in the future- will contact CDE if wishes to move forward.     June Walker RD, CDE  Diabetes     Time Spent: 60 minutes  Encounter Type: Individual    Any diabetes medication dose changes were made via the CDE Protocol and Collaborative Practice Agreement with the patient's endocrinology provider. A copy of this encounter was shared with the provider.

## 2019-01-14 NOTE — TELEPHONE ENCOUNTER
"Requested Prescriptions   Pending Prescriptions Disp Refills     lisinopril (PRINIVIL/ZESTRIL) 20 MG tablet [Pharmacy Med Name: LISINOPRIL 20MG TABLETS] 90 tablet 0    Last Written Prescription Date:  9/7/18  Last Fill Quantity: 90,  # refills: 0   Last Office Visit: 1/10/2019 Jessica      Return in about 6 months (around 7/10/2019) for BP Recheck, Routine Visit.     Future Office Visit:    Next 5 appointments (look out 90 days)    Jan 22, 2019  8:30 AM CST  Return Visit with SARAH Raymundo [facility] [address] [facility] [address] [phone]          Sig: TAKE 1 TABLET(20 MG) BY MOUTH DAILY    ACE Inhibitors (Including Combos) Protocol Passed - 1/12/2019 12:29 PM       Passed - Blood pressure under 140/90 in past 12 months    BP Readings from Last 3 Encounters:   01/10/19 138/82   12/10/18 122/64   12/03/18 134/78                Passed - Recent (12 mo) or future (30 days) visit within the authorizing provider's specialty    Patient had office visit in the last 12 months or has a visit in the next 30 days with authorizing provider or within the authorizing provider's specialty.  See \"Patient Info\" tab in inbasket, or \"Choose Columns\" in Meds & Orders section of the refill encounter.             Passed - Medication is active on med list       Passed - Patient is age 18 or older       Passed - Normal serum creatinine on file in past 12 months    Recent Labs   Lab Test 01/10/19  1105  01/25/16  0704   CR 0.89   < >  --    CREAT  --   --  0.8    < > = values in this interval not displayed.            Passed - Normal serum potassium on file in past 12 months    Recent Labs   Lab Test 01/10/19  1105   POTASSIUM 4.2             "

## 2019-01-15 RX ORDER — LISINOPRIL 20 MG/1
TABLET ORAL
Qty: 90 TABLET | Refills: 0
Start: 2019-01-15

## 2019-01-15 RX ORDER — LISINOPRIL 20 MG/1
20 TABLET ORAL DAILY
Qty: 90 TABLET | Refills: 0 | Status: ON HOLD | OUTPATIENT
Start: 2019-01-15 | End: 2019-08-01

## 2019-01-17 ENCOUNTER — DOCUMENTATION ONLY (OUTPATIENT)
Dept: ORTHOPEDICS | Facility: CLINIC | Age: 59
End: 2019-01-17

## 2019-01-17 ENCOUNTER — OFFICE VISIT (OUTPATIENT)
Dept: ORTHOPEDICS | Facility: CLINIC | Age: 59
End: 2019-01-17
Payer: COMMERCIAL

## 2019-01-17 VITALS
DIASTOLIC BLOOD PRESSURE: 72 MMHG | SYSTOLIC BLOOD PRESSURE: 130 MMHG | HEIGHT: 71 IN | BODY MASS INDEX: 34.3 KG/M2 | WEIGHT: 245 LBS

## 2019-01-17 DIAGNOSIS — G45.8 OTHER SPECIFIED TRANSIENT CEREBRAL ISCHEMIAS: ICD-10-CM

## 2019-01-17 DIAGNOSIS — M70.62 GREATER TROCHANTERIC BURSITIS OF BOTH HIPS: Primary | ICD-10-CM

## 2019-01-17 DIAGNOSIS — E11.42 TYPE 2 DIABETES MELLITUS WITH PERIPHERAL NEUROPATHY (H): ICD-10-CM

## 2019-01-17 DIAGNOSIS — M70.61 GREATER TROCHANTERIC BURSITIS OF BOTH HIPS: Primary | ICD-10-CM

## 2019-01-17 DIAGNOSIS — M75.42 IMPINGEMENT SYNDROME OF LEFT SHOULDER: ICD-10-CM

## 2019-01-17 DIAGNOSIS — M12.812 ROTATOR CUFF ARTHROPATHY, LEFT: ICD-10-CM

## 2019-01-17 PROCEDURE — 99243 OFF/OP CNSLTJ NEW/EST LOW 30: CPT | Performed by: FAMILY MEDICINE

## 2019-01-17 ASSESSMENT — MIFFLIN-ST. JEOR: SCORE: 1959.47

## 2019-01-17 NOTE — PATIENT INSTRUCTIONS
1. Greater trochanteric bursitis of both hips    2. Impingement syndrome of left shoulder    3. Rotator cuff arthropathy, left    4. Type 2 diabetes mellitus with peripheral neuropathy (H)    5. Other specified transient cerebral ischemias      -Patient has bilateral hip pain due to greater trochanteric bursitis and left shoulder pain due to impingement syndrome and chronic tendinopathy  -Patient mainly has symptoms at night that wake him up from sleep.  -Previous MRI of the shoulder was reviewed and discussed.  Previous visit with Dr. Lozano was also reviewed and discussed with patient.  -Patient has diabetes and history of stroke for which he is taking high-dose aspirin.  Patient has history of severe reaction to cortisone injections causing blood sugars to rise into the 400s and so he cannot tolerate cortisone injections.  Patient cannot take oral anti-inflammatories due to high risk for bleeding but since he is on aspirin.  -Previous notes from Neurology and PCP was reviewed including lab results.  -Patient will try 4% lidocaine patches which she can purchase over-the-counter at the pharmacy.  If the patches do not relieve his pain, patient will call the office and we will try cyclobenzaprine 5-10 mg at night.  -Patient will start formal physical therapy and home exercise program for his bilateral hip pain.   -patient will continue with his home program for his left shoulder.  -Call direct clinic number [[phone]] at any time with questions or concerns.    Albert Yeo MD [facility]  [facility] [place]

## 2019-01-17 NOTE — PROGRESS NOTES
Patient came into the [facility] [place] location on 1/17/2019, spoke to Kendra quickly that his foot orthotics caused an ulcer, yelled his name and left without her being able to assist.     I called patient back at 11:55am on 1/17/2019 to discuss the foot orthotics. I let the patient know that we would like to have him come in so we can evaluate if a change or return needs to be done with the foot orthotics and assist with care of the ulcer. He stated my two options were: he would seek legal and go to his doctor if I made him do that, or return the foot orthotics without him coming in. I tried to explain to the patient that we needed to see him to return the foot orthotics since they are custom made and under warranty. He continued to cut me off and state I had to do one of the two ways he said because he would not be coming back into our clinic. I then tried to again tell him we wanted to assist especially since there was an ulcer we should medically help with. He then said we're done talking and hung up.   1/17/2019 YAIR

## 2019-01-17 NOTE — LETTER
1/17/2019         RE: Crispin Rojas  [address] [facility] [address]        Dear Colleague,    Thank you for referring your patient, Crispin Rojas, to the [facility]. Please see a copy of my visit note below.    ASSESSMENT & PLAN  Patient Instructions     1. Greater trochanteric bursitis of both hips    2. Impingement syndrome of left shoulder    3. Rotator cuff arthropathy, left    4. Type 2 diabetes mellitus with peripheral neuropathy (H)    5. Other specified transient cerebral ischemias      -Patient has bilateral hip pain due to greater trochanteric bursitis and left shoulder pain due to impingement syndrome and chronic tendinopathy  -Patient mainly has symptoms at night that wake him up from sleep.  -Previous MRI of the shoulder was reviewed and discussed.  Previous visit with Dr. Lozano was also reviewed and discussed with patient.  -Patient has diabetes and history of stroke for which he is taking high-dose aspirin.  Patient has history of severe reaction to cortisone injections causing blood sugars to rise into the 400s and so he cannot tolerate cortisone injections.  Patient cannot take oral anti-inflammatories due to high risk for bleeding but since he is on aspirin.  -Previous notes from Neurology and PCP was reviewed including lab results.  -Patient will try 4% lidocaine patches which she can purchase over-the-counter at the pharmacy.  If the patches do not relieve his pain, patient will call the office and we will try cyclobenzaprine 5-10 mg at night.  -Patient will start formal physical therapy and home exercise program for his bilateral hip pain.   -patient will continue with his home program for his left shoulder.  -Call direct clinic number [[phone]] at any time with questions or concerns.    Albert Yeo MD [facility]  [facility] [place]          -----    SUBJECTIVE  Crispin Rojas is a/an 58 year  old right handed male who is seen in consultation at the request of  Aden Lopez M.D. for evaluation of left hip and left shoulder pain. The patient is seen by themselves.    BILATERAL HIPS  Onset: 15 month(s) ago. Reports insidious onset without acute precipitating event.  Location of Pain: bilateral lateral aspect of hips  Rating of Pain at worst: 7/10  Rating of Pain Currently: 2/10  Worsened by: pain worse at night, prolonged sitting in a car  Better with: rest/activity avoidance  Treatments tried: rest/activity avoidance and previous imaging (xray 1/10/19)  Quality: aching  Associated symptoms: no distal numbness or tingling; denies swelling or warmth  Orthopedic history: YES - low back pain with left sided sciatica Date: about 10 years ago  Relevant surgical history: NO  Social history: social history: patient not currently working    LEFT SHOULDER  Onset: 5 years(s) ago. Reports insidious onset without acute precipitating event.  Location of Pain: left anterior aspect of shoulder  Rating of Pain at worst: 10/10  Rating of Pain Currently: 0/10  Worsened by: pain worse at night, pain worse while playing golf  Better with: rest/activity avoidance  Treatments tried: rest/activity avoidance, home exercises, physical therapy (9 visits), previous imaging (MRI 1/29/18 and xray 1/10/19), corticosteroid injection (most recent date: about 4 years) that provided  2 week(s) of relief and tiger balm  Associated symptoms: no distal numbness or tingling; denies swelling or warmth  Orthopedic history: NO  Relevant surgical history: NO      Past Medical History:   Diagnosis Date     Cerebral infarction (H)      Chronic infection      H/O heartburn      History of heartburn      Hypertension      Numbness and tingling      Obesity      GILA (obstructive sleep apnea) 10/22/2018     Renal stone      Type II or unspecified type diabetes mellitus without mention of complication, not stated as uncontrolled      Social  "History     Socioeconomic History     Marital status:      Spouse name: Sydney     Number of children: 2     Years of education: Not on file     Highest education level: Not on file   Social Needs     Financial resource strain: Not on file     Food insecurity - worry: Not on file     Food insecurity - inability: Not on file     Transportation needs - medical: Not on file     Transportation needs - non-medical: Not on file   Occupational History     Occupation: marketing     Employer: [employer]     Comment: [employer]   Tobacco Use     Smoking status: Never Smoker     Smokeless tobacco: Never Used   Substance and Sexual Activity     Alcohol use: Yes     Alcohol/week: 0.0 oz     Comment: rare---red wine 2x per week     Drug use: No     Sexual activity: Yes     Partners: Female   Other Topics Concern     Parent/sibling w/ CABG, MI or angioplasty before 65F 55M? No   Social History Narrative     Not on file         Patient's past medical, surgical, social, and family histories were reviewed today and no changes are noted.    REVIEW OF SYSTEMS:  10 point ROS is negative other than symptoms noted above in HPI, Past Medical History or as stated below  Constitutional: NEGATIVE for fever, chills, change in weight  Skin: NEGATIVE for worrisome rashes, moles or lesions  GI/: NEGATIVE for bowel or bladder changes  Neuro: NEGATIVE for weakness, dizziness or paresthesias    OBJECTIVE:  /72   Ht 1.797 m (5' 10.75\")   Wt 111.1 kg (245 lb)   BMI 34.41 kg/m      General: healthy, alert and in no distress  HEENT: no scleral icterus or conjunctival erythema  Skin: no suspicious lesions or rash. No jaundice.  CV: no pedal edema  Resp: normal respiratory effort without conversational dyspnea   Psych: normal mood and affect  Gait: normal steady gait with appropriate coordination and balance  Neuro: Normal light sensory exam of lower extremity  MSK:  BILATERAL HIP  Inspection:    No obvious deformity or asymmetry, level " pelvis  Palpation:    Tender about the greater trochanteric region. Otherwise all other landmarks are nontender.  Active Range of Motion:     Flexion limited by tightness, IR within normal limits, ER  within normal limits  Strength:    Flexion 5/5, adduction 5/5, abduction 5/5  Special Tests:    Positive: none     Negative: Logroll, resisted gluteus medius provocation, NICHOLE, anterior impingement (FADIR), posterior impingement (EX/AB/ER), Edith's, SI provocative testing    LEFT SHOULDER  Inspection:    no swelling, bruising, discoloration, or obvious deformity or asymmetry  Palpation:    Tender about the anterior capsule and supraspinatus insertion. Remainder of bony and tendinous landmarks are nontender.    Crepitus is Absent  Active Range of Motion:     Abduction normal0 / FF normal0 / ER normal0 / IR L3.  Opposite arm abduction/flexion/ER within normal limits, IR normal    Scapular dyskinesis absent  Strength:    Scapular plane abduction 5-/5 / ER 5-/5 / IR 5/5 / biceps 5/5 / triceps 5/5  Special Tests:    Positive: Neer's and supraspinatus (empty can)    Negative: Dang', drop arm/painful arc, crossed arm adduction, [place]'s, Speed's and Yergason's      Independent visualization of the below image:  MRI LEFT SHOULDER WITHOUT CONTRAST  1/29/2018 7:33 AM     HISTORY: Three years of left shoulder pain. Evaluate for rotator cuff  tear.     COMPARISON: Radiographs on 1/12/2018.     TECHNIQUE: Multiplanar MR imaging was performed without contrast.     FINDINGS:  Osseous acromion outlet: There is a type II configuration of the  acromion with no significant lateral or anterior downsloping. There  are mild degenerative changes of the acromioclavicular joint. The  outlet is widely patent. No os acromiale.     Rotator cuff: There is mild increased signal within and thickening of  the distal fibers of the supraspinatus tendon. This does not involve  the surface of the tendon and does not represent a tear. The  remaining  rotator cuff structures are intact and unremarkable. No fatty atrophy  of the musculature is seen.      Labrum: No tear or other labral pathology is demonstrated.     Biceps tendon: The biceps tendon is in the bicipital groove. It is  intact and demonstrates normal signal.     Osseous structures and cartilaginous surfaces: No fracture or osseous  lesion is seen. No abnormal marrow signal intensity is identified. The  cartilage surfaces are well preserved.     Additional findings: No joint effusion is demonstrated. There is no  fluid within the subacromial-subdeltoid bursa. The adjacent soft  tissues are unremarkable.                                                                      IMPRESSION:   1. Mild tendinosis of the supraspinatus. No rotator cuff tear is seen.  2. Mild degenerative changes of the acromioclavicular joint.     KESHAWN DA SILVA MD    No results found for this or any previous visit (from the past 24 hour(s)).  PELVIS WITH BILATERAL HIP 1/10/2019 11:33 AM      HISTORY: Hip pain, left.     COMPARISON: None.     FINDINGS:  Mild bilateral loss of joint space and spurring. There is  no acute fracture. No dislocation. There are no worrisome bony  lesions.                                                                      IMPRESSION:  No acute osseous abnormality demonstrated.     JOHN TATE MD    SHOULDER TWO VIEWS LEFT  1/10/2019 11:34 AM      HISTORY: Left shoulder pain, unspecified chronicity     COMPARISON: January 12, 2018     FINDINGS: Trace spurring. The acromioclavicular and coracoclavicular  distances appear within normal limits. The subacromial space is  maintained. There is no acute fracture or demonstrated dislocation.  There are no worrisome bony lesions.                                                                      IMPRESSION: No acute osseous abnormality demonstrated.     JOHN TATE MD    Patient's conditions were thoroughly discussed during today's visit  with greater than 50% of the visit spent counseling the patient with total time spent face-to-face with the patient being 50 minutes.    Albert Yeo MD [facility]      Again, thank you for allowing me to participate in the care of your patient.        Sincerely,        Albert Yeo, MD

## 2019-01-17 NOTE — PATIENT INSTRUCTIONS
Schedule an appointment or come into the [facility] clinic to return Foot Orthotics.

## 2019-01-21 ENCOUNTER — PATIENT OUTREACH (OUTPATIENT)
Dept: NURSING | Facility: CLINIC | Age: 59
End: 2019-01-21

## 2019-01-21 NOTE — PROGRESS NOTES
Patient returned my voicemail to reschedule missed appointment from last week.  Rescheduled for 1/29/2019.    Juan M Oshea, Health    1/21/2019    3:01 PM

## 2019-01-21 NOTE — PROGRESS NOTES
Outreached patient to follow-up on No Show of our last scheduled Health Coaching appointment (1/14/2019), but wasn't able to connect. Left message for call back.    Juan M Oshea, Health    1/21/2019    2:37 PM

## 2019-01-23 ENCOUNTER — THERAPY VISIT (OUTPATIENT)
Dept: PHYSICAL THERAPY | Facility: CLINIC | Age: 59
End: 2019-01-23
Payer: COMMERCIAL

## 2019-01-23 DIAGNOSIS — M70.62 GREATER TROCHANTERIC BURSITIS OF BOTH HIPS: ICD-10-CM

## 2019-01-23 DIAGNOSIS — M70.61 GREATER TROCHANTERIC BURSITIS OF BOTH HIPS: ICD-10-CM

## 2019-01-23 PROCEDURE — 97161 PT EVAL LOW COMPLEX 20 MIN: CPT | Mod: GP | Performed by: PHYSICAL THERAPIST

## 2019-01-23 PROCEDURE — 97110 THERAPEUTIC EXERCISES: CPT | Mod: GP | Performed by: PHYSICAL THERAPIST

## 2019-01-24 NOTE — PROGRESS NOTES
[facility] Initial Evaluation  Crispin Rojas is a 58 year old  male referred to physical therapy by  *** for treatment of *** with Precautions/Restrictions/MD instructions ***       Physical Therapy Initial Evaluation: Subjective History      Injury/Condition Details:  Presenting Complaint ***   Onset Timing/Date ***   Mechanism ***      Symptom Behavior Details    Primary Pain Symptoms Location: ***  Quality: ***   Frequency: ***   Worst Pain ***/10   Best Pain ***/10   Symptom Provocators ***   Symptom Relievers ***   Time of day dependent? ***   Recent symptom change? ***      Prior Testing/Intervention for current condition:  Prior Tests ***   Prior Treatment ***      Lifestyle & General Medical History:  General Health Reported by Patient ***   Employment ***   Usual physical activities  (within past year) ***   Orthopaedic history ***   Notable medical history ***     HPI                    Objective:  System    Physical Exam    General     ROS    Assessment/Plan:    {REHAB NOTES:230623}

## 2019-01-25 ENCOUNTER — DOCUMENTATION ONLY (OUTPATIENT)
Dept: SLEEP MEDICINE | Facility: CLINIC | Age: 59
End: 2019-01-25
Payer: COMMERCIAL

## 2019-01-25 DIAGNOSIS — L21.9 SEBORRHEIC DERMATITIS: ICD-10-CM

## 2019-01-25 PROBLEM — M70.61 GREATER TROCHANTERIC BURSITIS OF BOTH HIPS: Status: ACTIVE | Noted: 2019-01-25

## 2019-01-25 PROBLEM — M70.62 GREATER TROCHANTERIC BURSITIS OF BOTH HIPS: Status: ACTIVE | Noted: 2019-01-25

## 2019-01-25 NOTE — PROGRESS NOTES
STM Re-Check : Patient has increased his use of CPAP. But still using less than 4 hours a night. Patient sometimes panics when he use the mask and has to rip it off right away. Will call patient after he see's his Provider. Patient didn't want to make any changes at this time.

## 2019-01-25 NOTE — PROGRESS NOTES
[facility] Initial Evaluation  Crispin Rojas is a 58 year old  male referred to physical therapy by Dr. Yeo for treatment of bilateral hip pain with Precautions/Restrictions/MD instructions eval and treat       Physical Therapy Initial Evaluation: Subjective History      Injury/Condition Details:  Presenting Complaint Bilateral hip pain   Onset Timing/Date Chronic pain since July 2017   Mechanism Insidious onset without acute precipitating event      Symptom Behavior Details    Primary Pain Symptoms Location: Bilateral lateral hip pain  Quality: sharp   Frequency: Intermittent   Worst Pain 6/10   Best Pain 0/10   Symptom Provocators Prolonged sitting, sleeping   Symptom Relievers Activity avoidance, rest   Time of day dependent? Worse during the day   Recent symptom change? None      Prior Testing/Intervention for current condition:  Prior Tests X-ray of bilateral hip indicating:  IMPRESSION:  No acute osseous abnormality demonstrated.   Prior Treatment None      Lifestyle & General Medical History:  General Health Reported by Patient fair   Employment Pre school owner   Usual physical activities  (within past year) Walking   Notable medical history Diabetes, overweight, sleep disorder/apnea, stroke, pain at night/rest       HIP EVALUATION  Dynamic movement screen:  Gait: Normal  2 leg squat: Moderate anterior knee excursion  SL Balance: R 5 sec, L 10 sec    Knee screen: Unremarkable  Low back screen: Unremarkable    Range of Motion  Hip ROM    Left WNL   Right WNL       Hip and Knee Strength  Hip MMT Flex Ext Abd Add   Left 5/5 4/5 4/5    Right 5/5 4+/5 4+/5    Knee MMT Ext Flex   Left 5/5 5/5   Right 5/5 5/5       Palpation: Significant tenderness over bilateral greater trochanter bursae  Special Tests:   Hip Scour Test: Negative  NICHOLE Test: Negative  FADIR Test: Negative  Noble's Compression Test: Negative    Flexibility:  Edith Test: R - Normal, L - Normal  Hamstring: R - Limited, L -  Limited              HPI                    Objective:  System    Physical Exam    General     ROS    Assessment/Plan:    Patient is a 58 year old male with both sides hip complaints.    Patient has the following significant findings with corresponding treatment plan.                Diagnosis 1:  Bilateral hip pain  Pain -  manual therapy, splint/taping/bracing/orthotics, self management, education and home program  Decreased ROM/flexibility - manual therapy, therapeutic exercise, therapeutic activity and home program  Decreased strength - therapeutic exercise, therapeutic activities and home program  Impaired balance - neuro re-education, therapeutic activities and home program  Decreased proprioception - neuro re-education, therapeutic activities and home program  Impaired muscle performance - neuro re-education and home program    Therapy Evaluation Codes:   1) History comprised of:   Personal factors that impact the plan of care:      None.    Comorbidity factors that impact the plan of care are:      Diabetes, Overweight, Pain at night/rest and Stroke.     Medications impacting care: None.  2) Examination of Body Systems comprised of:   Body structures and functions that impact the plan of care:      Lumbar spine.   Activity limitations that impact the plan of care are:      Lifting, Squatting/kneeling, Stairs, Standing and Working.  3) Clinical presentation characteristics are:   Stable/Uncomplicated.  4) Decision-Making    Low complexity using standardized patient assessment instrument and/or measureable assessment of functional outcome.  Cumulative Therapy Evaluation is: Low complexity.    Previous and current functional limitations:  (See Goal Flow Sheet for this information)    Short term and Long term goals: (See Goal Flow Sheet for this information)     Communication ability:  Patient appears to be able to clearly communicate and understand verbal and written communication and follow directions  correctly.  Treatment Explanation - The following has been discussed with the patient:   RX ordered/plan of care  Anticipated outcomes  Possible risks and side effects  This patient would benefit from PT intervention to resume normal activities.   Rehab potential is good.    Frequency:  1 X week, once daily  Duration:  for 6 weeks  Discharge Plan:  Achieve all LTG.  Independent in home treatment program.  Reach maximal therapeutic benefit.    Please refer to the daily flowsheet for treatment today, total treatment time and time spent performing 1:1 timed codes.

## 2019-01-28 NOTE — TELEPHONE ENCOUNTER
"Requested Prescriptions   Pending Prescriptions Disp Refills     ketoconazole (NIZORAL) 2 % external shampoo [Pharmacy Med Name: KETOCONAZOLE 2% SHAMPOO 120ML] 120 mL 0     Sig: APPLY TO THE AFFECTED AREA AND THEN WASH OFF AFTER 5 MINUTES.    Antifungal Agents Passed - 1/25/2019  3:22 PM       Passed - Recent (12 mo) or future (30 days) visit within the authorizing provider's specialty    Patient had office visit in the last 12 months or has a visit in the next 30 days with authorizing provider or within the authorizing provider's specialty.  See \"Patient Info\" tab in inbasket, or \"Choose Columns\" in Meds & Orders section of the refill encounter.             Passed - Not Fluconazole or Terconazole     If oral Fluconazole or Terconazole, may refill if indicated in progress notes.          Passed - Medication is active on med list        Last Written Prescription Date:  Historical  Last Fill Quantity: ,  # refills:    Last office visit: 1/10/2019 with prescribing provider:  Dr Lopez   Future Office Visit:   Next 5 appointments (look out 90 days)    Mar 29, 2019  2:30 PM CDT  Return Visit with SARAH Raymundo CNP  [facility] [address]  [facility] [address] [phone]           "

## 2019-01-29 ENCOUNTER — MYC MEDICAL ADVICE (OUTPATIENT)
Dept: FAMILY MEDICINE | Facility: CLINIC | Age: 59
End: 2019-01-29

## 2019-01-29 ENCOUNTER — ALLIED HEALTH/NURSE VISIT (OUTPATIENT)
Dept: NURSING | Facility: CLINIC | Age: 59
End: 2019-01-29

## 2019-01-29 DIAGNOSIS — E11.42 TYPE 2 DIABETES MELLITUS WITH PERIPHERAL NEUROPATHY (H): ICD-10-CM

## 2019-01-29 DIAGNOSIS — E66.01 MORBID OBESITY, UNSPECIFIED OBESITY TYPE (H): Primary | ICD-10-CM

## 2019-01-29 PROCEDURE — 99207 ZZC HEALTH COACHING, NO CHARGE: CPT

## 2019-01-29 RX ORDER — KETOCONAZOLE 20 MG/ML
SHAMPOO TOPICAL
Qty: 120 ML | Refills: 0 | Status: SHIPPED | OUTPATIENT
Start: 2019-01-29 | End: 2019-03-18

## 2019-01-29 NOTE — TELEPHONE ENCOUNTER
Routing refill request to provider to review approval because:  ? Ongoing  June Thomas RN, BSN  Message handled by Nurse Triage.

## 2019-01-29 NOTE — TELEPHONE ENCOUNTER
Aden Lopez MD see [employer] message and ADVISE, pt asking about elevated liver function labs at recent appointment    Component      Latest Ref Rng & Units 6/23/2018 1/10/2019   Bilirubin Total      0.2 - 1.3 mg/dL 0.7 0.8   Albumin      3.4 - 5.0 g/dL 4.1 4.0   Protein Total      6.8 - 8.8 g/dL 8.4 7.7   Alkaline Phosphatase      40 - 150 U/L 58 47   ALT      0 - 70 U/L 62 82 (H)   AST      0 - 45 U/L 42 67 (H)     June Thomas RN, BSN  Message handled by Nurse Triage.

## 2019-01-30 NOTE — PROGRESS NOTES
January 30, 2019    Health Coaching Progress Note    Patient Name: Crispin Rojas Date: January 30, 2019      Session Length: 30      DATA    PRM Master Survey Scores Reviewed: Yes    Core Healthy Days Survey:         ROCHELLE Score (Last Two) 6/21/2018   ROCHELLE Raw Score 30   Activation Score 56   ROCHELLE Level 3       PHQ-2 Score 1/10/2019 6/21/2018   PHQ-2 Total Score (Adult) - Positive if 3 or more points; Administer PHQ-9 if positive 0 0   MyC PHQ-2 Score 0 -       PHQ-9 SCORE 3/20/2018 5/9/2018   PHQ-9 Total Score MyChart Incomplete -   PHQ-9 Total Score - 0       Treatment Objective(s) Addressed in This Session:  Target Behavior(s): diet/weight loss and disease management/lifestyle changes of working on getting back to old healthy habits-being more aware of dietary choices by reducing carb intake, adding in more fresh foods and protein, trying to find ways to be more active with busy schedule-walking more, returning to gym ([employer]) for workouts 4-5 days a week, getting more sleep each night/tries to get in 8 hours, managing diabetes well, losing weight and maintaining,  accessing resources and scheduling/attending appointments as needed-appt with sleep DrRiley, Cardiologist, and Physical Therapy.      Current Stressors / Issues:   A lot of upcoming travel to [place], having a hard time adjusting to CPAP, still searching for work-hasn't been as good with his activity level, not in a set routine right now, got a new sleep machine/not sleeping as well, A1C up slightly to 7.6 from 7.5, working with Diabetes Ed for other diabetes management options, finding time to be active, being more disciplined with food choices, not getting enough sleep, unexplained spikes in blood sugar, ongoing shoulder pain-arthritis/limits mobility, loves competitive sports (golf)/activities but hasn't been able to participate due to time constraints/injuries, wife is a good cook-eats too well sometimes, started gaining weight with insulin use,  wants to lose weight and maintain it, managing diabetes well/avoiding other medications  What Patient Does Well:   1) Patient has had success with lifestyle changes in the past and feels he knows what he needs to do, but just struggles to find time to fit everything into his routine  Previous Successes:   1) Patient's weight has been up and down, but he feels his stress levels have improved since quitting previous job-blood sugars have improved as well.  Areas in Need of Improvement:   1) Maintaining a regular exercise routine  2) Making healthier dietary choices-more fresh foods, protein-cutting back on carbs  3) Losing weight and keeping it off  4) Managing shoulder and hip pain/regaining strength  Barriers to Change:   1) Shoulder injury  2) Bad cycle of losing a lot of weight and then gaining it all back  3) Still looking for a new job  Reasons for Change:   1) Lose weight  2) Continue to manage diabetes  3) Be healthier and avoid future problems  Plan/Goal for the Next 4 Weeks:   Goals Addressed as of 1/30/2019 at 5:49 PM                 1/29/19 12/21/18      Appointments (pt-stated)   100%  20%    Added 12/21/18 by Juan M Oshea     My Goal: I will attend upcoming scheduled annual physical with Dr. Lopez    What I need to meet my goal: Transportation/Remember to go    I plan to meet my goal by this date: Next Coaching Session          Appointments (pt-stated)   10%      Added 1/29/19 by Juan M Oshea     My Goal: I will attend upcoming appointments for Physical Therapy (weekly), sleep DrRiley, and heart/cardio follow-up    What I need to meet my goal: Schedule/transportation    I plan to meet my goal by this date: Next Coaching        Being Active (pt-stated)   0%  0%    Added 12/21/18 by Juan M Oshea     My Goal: I will start a regular exercise routine at [employer] 4-5 days a week    What I need to meet my goal: Gym membership/regular routine    I plan to meet my goal by this date: Next Coaching  Session          Healthy Eating (pt-stated)   100%  80%    Added 12/21/18 by Juan M Oshea     My Goal: I will be more aware of my food choices and work on reducing carbohydrate intake, eating more fresh food items and adding in more protein each day    What I need to meet my goal: Awareness/Healthy food choices    I plan to meet my goal by this date: Next Coaching Session        Sleep (pt-stated)   80%  100%    Added 12/21/18 by Juan M Oshea     My Goal: I will get more sleep each night and try to get closer to 8 hours each night     What I need to meet my goal: Get to bed earlier    I plan to meet my goal by this date: Next Coaching Session            Intervention:  Motivational Interviewing    MI Intervention: Expressed Empathy/Understanding, Supported Autonomy, Collaboration, Evocation, Permission to raise concern or advise, Open-ended questions, Reflections: simple and complex and Change talk (evoked)     Change Talk Expressed by the Patient: Desire to change Ability to change Reasons to change Need to change Committment to change Activation Taking steps    Provider Response to Change Talk: E - Evoked more info from patient about behavior change, A - Affirmed patient's thoughts, decisions, or attempts at behavior change, R - Reflected patient's change talk and S - Summarized patient's change talk statements    Assessment / Progress on Treatment Objective(s) / Homework:    Achieved / completed to satisfaction - MAINTENANCE (Working to maintain change, with risk of relapse); Intervened by continuing to positively reinforce healthy behavior choice   Minimal progress - PREPARATION (Decided to change - considering how); Intervened by negotiating a change plan and determining options / strategies for behavior change, identifying triggers, exploring social supports, and working towards setting a date to begin behavior change  New Objective established this session - PREPARATION (Decided to change - considering how);  Intervened by negotiating a change plan and determining options / strategies for behavior change, identifying triggers, exploring social supports, and working towards setting a date to begin behavior change  Satisfactory progress - ACTION (Actively working towards change); Intervened by reinforcing change plan / affirming steps taken     Plan: (Homework, other):  Patient was encouraged to continue to seek condition-related information and education, as well as schedule a follow up appointment with the Health  in 4 weeks. Patient has set self-identified goals and will monitor progress until the next appointment.  Scheduled our next coaching session for Tuesday March 5th at 4pm.      Juan M Oshea [facility]    1/30/2019    5:52 PM

## 2019-01-31 ENCOUNTER — THERAPY VISIT (OUTPATIENT)
Dept: PHYSICAL THERAPY | Facility: CLINIC | Age: 59
End: 2019-01-31
Payer: COMMERCIAL

## 2019-01-31 DIAGNOSIS — M70.61 GREATER TROCHANTERIC BURSITIS OF BOTH HIPS: ICD-10-CM

## 2019-01-31 DIAGNOSIS — M70.62 GREATER TROCHANTERIC BURSITIS OF BOTH HIPS: ICD-10-CM

## 2019-01-31 PROCEDURE — 97110 THERAPEUTIC EXERCISES: CPT | Mod: GP | Performed by: PHYSICAL THERAPY ASSISTANT

## 2019-01-31 PROCEDURE — 97140 MANUAL THERAPY 1/> REGIONS: CPT | Mod: GP | Performed by: PHYSICAL THERAPY ASSISTANT

## 2019-01-31 NOTE — TELEPHONE ENCOUNTER
I totally agree with .  The liver elevations are minor and likely due to fatty liver.  We'll just continue to monitor this.  No changes in medications recommended at this time.  Jasmin Arteaga NP  Endocrinology

## 2019-01-31 NOTE — TELEPHONE ENCOUNTER
Sent [facility] messages informing pt  June Thomas RN, BSN  Message handled by Nurse Triage.

## 2019-02-07 ENCOUNTER — THERAPY VISIT (OUTPATIENT)
Dept: PHYSICAL THERAPY | Facility: CLINIC | Age: 59
End: 2019-02-07
Payer: COMMERCIAL

## 2019-02-07 DIAGNOSIS — M70.61 GREATER TROCHANTERIC BURSITIS OF BOTH HIPS: ICD-10-CM

## 2019-02-07 DIAGNOSIS — M70.62 GREATER TROCHANTERIC BURSITIS OF BOTH HIPS: ICD-10-CM

## 2019-02-07 PROCEDURE — 97110 THERAPEUTIC EXERCISES: CPT | Mod: GP | Performed by: PHYSICAL THERAPY ASSISTANT

## 2019-02-08 ENCOUNTER — DOCUMENTATION ONLY (OUTPATIENT)
Dept: SLEEP MEDICINE | Facility: CLINIC | Age: 59
End: 2019-02-08
Payer: COMMERCIAL

## 2019-02-10 DIAGNOSIS — E11.51 TYPE 2 DIABETES MELLITUS WITH PERIPHERAL VASCULAR DISEASE (H): ICD-10-CM

## 2019-02-11 NOTE — TELEPHONE ENCOUNTER
Requested Prescriptions   Pending Prescriptions Disp Refills     insulin lispro (HUMALOG KWIKPEN) 100 UNIT/ML pen [Pharmacy Med Name: HUMALOG 100 U/ML KWIK PEN INJ 3ML]  Last Written Prescription Date:  8/29/2018  Last Fill Quantity: 30 mL,  # refills: 3   Last office visit: 1/10/2019 with prescribing provider:  Jessica     Future Office Visit:   Next 5 appointments (look out 90 days)    Mar 29, 2019  2:30 PM CDT  Return Visit with SARAH Raymundo CNP  [facility] ([facility]) [address]  [facility] [address]  [phone]          30 mL 0     Sig: INJECT 35 UNITS BEFORE BREAKFAST, LUNCH AND DINNER    Short Acting Insulin Protocol Passed - 2/10/2019  3:25 PM       Passed - Blood pressure less than 140/90 in past 6 months    BP Readings from Last 3 Encounters:   01/17/19 130/72   01/10/19 138/82   12/10/18 122/64                Passed - LDL on file in past 12 months    Recent Labs   Lab Test 01/10/19  1105   LDL 41            Passed - Microalbumin on file in past 12 months    Recent Labs   Lab Test 01/10/19  1105  12/17/14   MICROALB  --   --  10   MICROL 40   < >  --    UMALCR 16.83   < >  --     < > = values in this interval not displayed.            Passed - Serum creatinine on file in past 12 months    Recent Labs   Lab Test 01/10/19  1105  01/25/16  0704   CR 0.89   < >  --    CREAT  --   --  0.8    < > = values in this interval not displayed.            Passed - HgbA1C in past 3 or 6 months    If HgbA1C is 8 or greater, it needs to be on file within the past 3 months.  If less than 8, must be on file within the past 6 months.     Recent Labs   Lab Test 01/10/19  1105   A1C 7.5*            Passed - Medication is active on med list       Passed - Patient is age 18 or older       Passed - Recent (6 mo) or future (30 days) visit within the authorizing provider's specialty    Patient had office visit in the last 6 months or has a visit in the next 30 days  "with authorizing provider or within the authorizing provider's specialty.  See \"Patient Info\" tab in inbasket, or \"Choose Columns\" in Meds & Orders section of the refill encounter.              "

## 2019-02-12 NOTE — TELEPHONE ENCOUNTER
Prescription approved per [facility] Refill Protocol.  June Thomas RN, BSN

## 2019-02-14 ENCOUNTER — THERAPY VISIT (OUTPATIENT)
Dept: PHYSICAL THERAPY | Facility: CLINIC | Age: 59
End: 2019-02-14
Payer: COMMERCIAL

## 2019-02-14 DIAGNOSIS — M70.61 GREATER TROCHANTERIC BURSITIS OF BOTH HIPS: ICD-10-CM

## 2019-02-14 DIAGNOSIS — M70.62 GREATER TROCHANTERIC BURSITIS OF BOTH HIPS: ICD-10-CM

## 2019-02-14 PROCEDURE — 97140 MANUAL THERAPY 1/> REGIONS: CPT | Mod: GP | Performed by: PHYSICAL THERAPY ASSISTANT

## 2019-02-14 PROCEDURE — 97110 THERAPEUTIC EXERCISES: CPT | Mod: GP | Performed by: PHYSICAL THERAPY ASSISTANT

## 2019-03-06 DIAGNOSIS — E78.5 HYPERLIPIDEMIA LDL GOAL <100: ICD-10-CM

## 2019-03-06 DIAGNOSIS — E11.49 DIABETES MELLITUS TYPE 2 WITH NEUROLOGICAL MANIFESTATIONS (H): ICD-10-CM

## 2019-03-06 RX ORDER — ATORVASTATIN CALCIUM 40 MG/1
40 TABLET, FILM COATED ORAL DAILY
Qty: 90 TABLET | Refills: 2 | Status: SHIPPED | OUTPATIENT
Start: 2019-03-06 | End: 2019-03-29

## 2019-03-07 NOTE — TELEPHONE ENCOUNTER
"Requested Prescriptions   Pending Prescriptions Disp Refills     atorvastatin (LIPITOR) 40 MG tablet [Pharmacy Med Name: ATORVASTATIN 40MG TABLETS]  Medication may not be due for refill  Last Written Prescription Date:  3/6/2019  Last Fill Quantity: 90 tablet,  # refills: 2   Last office visit: 1/10/2019 with prescribing provider:  Jessica   Future Office Visit:   Next 5 appointments (look out 90 days)    Mar 29, 2019  2:30 PM CDT  Return Visit with SARAH Raymundo CNP  [facility] ([facility]) [address] [facility] [address]  [phone]          90 tablet 0     Sig: TAKE 1 TABLET BY MOUTH EVERY DAY    Statins Protocol Passed - 3/6/2019  7:52 PM       Passed - LDL on file in past 12 months    Recent Labs   Lab Test 01/10/19  1105   LDL 41            Passed - No abnormal creatine kinase in past 12 months    No lab results found.            Passed - Recent (12 mo) or future (30 days) visit within the authorizing provider's specialty    Patient had office visit in the last 12 months or has a visit in the next 30 days with authorizing provider or within the authorizing provider's specialty.  See \"Patient Info\" tab in inbasket, or \"Choose Columns\" in Meds & Orders section of the refill encounter.             Passed - Medication is active on med list       Passed - Patient is age 18 or older          "

## 2019-03-11 RX ORDER — ATORVASTATIN CALCIUM 40 MG/1
TABLET, FILM COATED ORAL
Qty: 90 TABLET | Refills: 2 | Status: SHIPPED | OUTPATIENT
Start: 2019-03-11 | End: 2020-05-13

## 2019-03-11 NOTE — TELEPHONE ENCOUNTER
Prescription approved per [facility] Refill Protocol.  June Thomas RN, BSN

## 2019-03-12 ENCOUNTER — PATIENT OUTREACH (OUTPATIENT)
Dept: NURSING | Facility: CLINIC | Age: 59
End: 2019-03-12

## 2019-03-12 NOTE — PROGRESS NOTES
Outreached patient to follow-up on No Show of our last scheduled Health Coaching appointment, but wasn't able to connect. Left message for call back.    Juan M Oshea, Health    3/12/2019    2:39 PM

## 2019-03-18 DIAGNOSIS — E11.49 DIABETES MELLITUS TYPE 2 WITH NEUROLOGICAL MANIFESTATIONS (H): ICD-10-CM

## 2019-03-18 DIAGNOSIS — L21.9 SEBORRHEIC DERMATITIS: ICD-10-CM

## 2019-03-19 ENCOUNTER — TRANSFERRED RECORDS (OUTPATIENT)
Dept: HEALTH INFORMATION MANAGEMENT | Facility: CLINIC | Age: 59
End: 2019-03-19

## 2019-03-19 NOTE — TELEPHONE ENCOUNTER
Last Written Prescription Date:  12/07/18  Last Fill Quantity: 360 tablet,  # refills: 0   Last office visit: 10/11/2018 with prescribing provider:  AMANDA Arteaga   Future Office Visit:   Next 5 appointments (look out 90 days)    Mar 29, 2019  2:30 PM CDT  Return Visit with SARAH Raymundo CNP  [facility] ([facility]) [address] [facility] [address] [phone]         Requested Prescriptions   Pending Prescriptions Disp Refills     metFORMIN (GLUCOPHAGE-XR) 500 MG 24 hr tablet [Pharmacy Med Name: METFORMIN ER 500MG 24HR TABS] 360 tablet 0     Sig: TAKE 2 TABLETS BY MOUTH TWICE DAILY WITH MEALS    Biguanide Agents Passed - 3/18/2019  1:33 PM       Passed - Blood pressure less than 140/90 in past 6 months    BP Readings from Last 3 Encounters:   01/17/19 130/72   01/10/19 138/82   12/10/18 122/64                Passed - Patient has documented LDL within the past 12 mos.    Recent Labs   Lab Test 01/10/19  1105   LDL 41            Passed - Patient has had a Microalbumin in the past 15 mos.    Recent Labs   Lab Test 01/10/19  1105  12/17/14   MICROALB  --   --  10   MICROL 40   < >  --    UMALCR 16.83   < >  --     < > = values in this interval not displayed.            Passed - Patient is age 10 or older       Passed - Patient has documented A1c within the specified period of time.    If HgbA1C is 8 or greater, it needs to be on file within the past 3 months.  If less than 8, must be on file within the past 6 months.     Recent Labs   Lab Test 01/10/19  1105   A1C 7.5*            Passed - Patient's CR is NOT>1.4 OR Patient's EGFR is NOT<45 within past 12 mos.    Recent Labs   Lab Test 01/10/19  1105   GFRESTIMATED >90   GFRESTBLACK >90       Recent Labs   Lab Test 01/10/19  1105   CR 0.89            Passed - Patient does NOT have a diagnosis of CHF.       Passed - Medication is active on med list       Passed - Recent (6 mo) or future (30 days) visit within the  "authorizing provider's specialty    Patient had office visit in the last 6 months or has a visit in the next 30 days with authorizing provider or within the authorizing provider's specialty.  See \"Patient Info\" tab in inbasket, or \"Choose Columns\" in Meds & Orders section of the refill encounter.              "

## 2019-03-19 NOTE — PROGRESS NOTES
Subjective:  HPI                    Objective:  System    Physical Exam    General     ROS    Assessment/Plan:    DISCHARGE REPORT    Progress reporting period is from 1/23/19 to 2/14/19.      SUBJECTIVE  Subjective changes noted by patient:   Patient reported that he was doing well and then had a flare up with the left hip.  Did not do anything, i.e.; stretches, ice or OTC med's.  Not able to sleep on the left side. Patient stopped in after the last visit and said that he was feeling good and did not feel that he needed anymore visits.   Current pain level is .  Current Pain level: 3/10(last couple days 9-10/10, decreased to 5/10)    Previous pain level was:   Initial Pain level: 2/10   Changes in function:  Yes (See Goal flowsheet attached for changes in current functional level) Changes in function: No changes noted in function since last SOAP note   Adverse reaction to treatment or activity: None     OBJECTIVE  Changes noted in objective findings:  None  Objective: Tightness at the B Greater Trochanter today. Focus on the stretches vs the strengthening today     ASSESSMENT/PLAN  Updated problem list and treatment plan: Diagnosis 1:  B hip pain   STG/LTGs have been met:  Yes (See Goal flow sheet completed today.)  Progress toward STG/LTGs have been made:  Yes (See Goal flow sheet completed today.)  Assessment of Progress: The patient is no longer making progress in all 3 of the following areas: subjectively, objectively and functionally.  Self Management Plans:  Patient has been instructed in a home treatment program.  Crispin continues to require the following intervention to meet STG and LT's:  PT intervention is no longer required to meet STG/LTG.    Recommendations:  This patient is ready to be discharged from therapy and continue their home treatment program.

## 2019-03-19 NOTE — TELEPHONE ENCOUNTER
"Last Written Prescription Date:  1/29/19  Last Fill Quantity: 120 mL,  # refills: 0   Last office visit: 1/10/2019 with prescribing provider:  Jessica   Future Office Visit:   Next 5 appointments (look out 90 days)    Mar 29, 2019  2:30 PM CDT  Return Visit with SARAH Raymundo CNP  [facility] ([facility]) [address] [facility] [address] [phone]         Requested Prescriptions   Pending Prescriptions Disp Refills     ketoconazole (NIZORAL) 2 % external shampoo [Pharmacy Med Name: KETOCONAZOLE 2% SHAMPOO 120ML] 120 mL 0     Sig: APPLY TO THE AFFECTED AREA AND THEN WASH OFF AFTER 5 MINUTES.    Antifungal Agents Passed - 3/18/2019  1:34 PM       Passed - Recent (12 mo) or future (30 days) visit within the authorizing provider's specialty    Patient had office visit in the last 12 months or has a visit in the next 30 days with authorizing provider or within the authorizing provider's specialty.  See \"Patient Info\" tab in inbasket, or \"Choose Columns\" in Meds & Orders section of the refill encounter.             Passed - Not Fluconazole or Terconazole     If oral Fluconazole or Terconazole, may refill if indicated in progress notes.          Passed - Medication is active on med list          "

## 2019-03-20 ENCOUNTER — HOSPITAL ENCOUNTER (OUTPATIENT)
Dept: CARDIOLOGY | Facility: CLINIC | Age: 59
Discharge: HOME | End: 2019-03-20
Attending: FAMILY MEDICINE | Admitting: FAMILY MEDICINE
Payer: COMMERCIAL

## 2019-03-20 DIAGNOSIS — R94.31 ABNORMAL ELECTROCARDIOGRAM: ICD-10-CM

## 2019-03-20 PROCEDURE — 40000264 ECHOCARDIOGRAM COMPLETE

## 2019-03-20 PROCEDURE — 25800025 ZZH RX 258: Performed by: FAMILY MEDICINE

## 2019-03-20 PROCEDURE — 93306 TTE W/DOPPLER COMPLETE: CPT | Mod: 26 | Performed by: INTERNAL MEDICINE

## 2019-03-20 PROCEDURE — 25500064 ZZH RX 255 OP 636: Performed by: FAMILY MEDICINE

## 2019-03-20 RX ORDER — KETOCONAZOLE 20 MG/ML
SHAMPOO TOPICAL
Qty: 120 ML | Refills: 0 | Status: SHIPPED | OUTPATIENT
Start: 2019-03-20 | End: 2019-06-03

## 2019-03-20 RX ORDER — ACYCLOVIR 200 MG/1
30 CAPSULE ORAL ONCE
Status: COMPLETED | OUTPATIENT
Start: 2019-03-20 | End: 2019-03-20

## 2019-03-20 RX ORDER — METFORMIN HCL 500 MG
TABLET, EXTENDED RELEASE 24 HR ORAL
Qty: 360 TABLET | Refills: 0 | Status: SHIPPED | OUTPATIENT
Start: 2019-03-20 | End: 2019-03-29

## 2019-03-20 RX ADMIN — SODIUM CHLORIDE 30 ML: 9 INJECTION, SOLUTION INTRAMUSCULAR; INTRAVENOUS; SUBCUTANEOUS at 08:23

## 2019-03-20 RX ADMIN — HUMAN ALBUMIN MICROSPHERES AND PERFLUTREN 3 ML: 10; .22 INJECTION, SOLUTION INTRAVENOUS at 08:23

## 2019-03-20 NOTE — TELEPHONE ENCOUNTER
Prescription approved per [facility] Refill Protocol.  Override benefit outweighs risk.  Topical.   Keegan Medina RN

## 2019-03-21 PROBLEM — M70.62 GREATER TROCHANTERIC BURSITIS OF BOTH HIPS: Status: RESOLVED | Noted: 2019-01-25 | Resolved: 2019-03-21

## 2019-03-21 PROBLEM — M70.61 GREATER TROCHANTERIC BURSITIS OF BOTH HIPS: Status: RESOLVED | Noted: 2019-01-25 | Resolved: 2019-03-21

## 2019-03-25 DIAGNOSIS — Z79.4 TYPE 2 DIABETES MELLITUS WITH DIABETIC PERIPHERAL ANGIOPATHY AND GANGRENE, WITH LONG-TERM CURRENT USE OF INSULIN (H): ICD-10-CM

## 2019-03-25 DIAGNOSIS — E11.52 TYPE 2 DIABETES MELLITUS WITH DIABETIC PERIPHERAL ANGIOPATHY AND GANGRENE, WITH LONG-TERM CURRENT USE OF INSULIN (H): ICD-10-CM

## 2019-03-25 NOTE — TELEPHONE ENCOUNTER
Requested Prescriptions   Pending Prescriptions Disp Refills     TRESIBA FLEXTOUCH 100 UNIT/ML pen [Pharmacy Med Name: TRESIBA FLEXTOUCH PEN (U-[id number]]  Last Written Prescription Date:  11/21/2018  Last Fill Quantity: 30 mL,  # refills: 3   Last office visit: with prescribing provider:  1/10/2019Jessica     Future Office Visit:   Next 5 appointments (look out 90 days)    Mar 29, 2019  2:30 PM CDT  Return Visit with SARAH Raymundo CNP  [facility] [address]  [facility] [address]  [phone]           0     Sig: INJECT 55 UNITS SUB-Q DAILY    Long Acting Insulin Protocol Passed - 3/25/2019 11:12 AM       Passed - Blood pressure less than 140/90 in past 6 months    BP Readings from Last 3 Encounters:   01/17/19 130/72   01/10/19 138/82   12/10/18 122/64                Passed - LDL on file in past 12 months    Recent Labs   Lab Test 01/10/19  1105   LDL 41            Passed - Microalbumin on file in past 12 months    Recent Labs   Lab Test 01/10/19  1105  12/17/14   MICROALB  --   --  10   MICROL 40   < >  --    UMALCR 16.83   < >  --     < > = values in this interval not displayed.            Passed - Serum creatinine on file in past 12 months    Recent Labs   Lab Test 01/10/19  1105  01/25/16  0704   CR 0.89   < >  --    CREAT  --   --  0.8    < > = values in this interval not displayed.            Passed - HgbA1C in past 3 or 6 months    If HgbA1C is 8 or greater, it needs to be on file within the past 3 months.  If less than 8, must be on file within the past 6 months.     Recent Labs   Lab Test 01/10/19  1105   A1C 7.5*            Passed - Medication is active on med list       Passed - Patient is age 18 or older       Passed - Recent (6 mo) or future (30 days) visit within the authorizing provider's specialty    Patient had office visit in the last 6 months or has a visit in the next 30 days with authorizing provider or within the  "authorizing provider's specialty.  See \"Patient Info\" tab in inbasket, or \"Choose Columns\" in Meds & Orders section of the refill encounter.              "

## 2019-03-26 ENCOUNTER — PATIENT OUTREACH (OUTPATIENT)
Dept: NURSING | Facility: CLINIC | Age: 59
End: 2019-03-26

## 2019-03-26 RX ORDER — INSULIN DEGLUDEC 100 U/ML
INJECTION, SOLUTION SUBCUTANEOUS
Qty: 30 ML | Refills: 0 | Status: SHIPPED | OUTPATIENT
Start: 2019-03-26 | End: 2019-03-29

## 2019-03-26 NOTE — PROGRESS NOTES
Outreached patient for second time, but unfortunately caught him at a bad time.  States he is in [place], but will be returning tomorrow.  He will call Friday to schedule our next appointment.    Juan M Oshea, Health    3/26/2019    10:44 AM

## 2019-03-26 NOTE — TELEPHONE ENCOUNTER
Prescription approved per [facility] Refill Protocol.  Javier Hendrickson RN, BSN

## 2019-03-29 ENCOUNTER — OFFICE VISIT (OUTPATIENT)
Dept: ENDOCRINOLOGY | Facility: CLINIC | Age: 59
End: 2019-03-29
Payer: COMMERCIAL

## 2019-03-29 VITALS
BODY MASS INDEX: 33.75 KG/M2 | RESPIRATION RATE: 12 BRPM | WEIGHT: 240.3 LBS | DIASTOLIC BLOOD PRESSURE: 76 MMHG | HEART RATE: 54 BPM | SYSTOLIC BLOOD PRESSURE: 150 MMHG | OXYGEN SATURATION: 97 %

## 2019-03-29 DIAGNOSIS — Z79.4 TYPE 2 DIABETES MELLITUS WITH DIABETIC PERIPHERAL ANGIOPATHY AND GANGRENE, WITH LONG-TERM CURRENT USE OF INSULIN (H): ICD-10-CM

## 2019-03-29 DIAGNOSIS — E11.49 DIABETES MELLITUS TYPE 2 WITH NEUROLOGICAL MANIFESTATIONS (H): ICD-10-CM

## 2019-03-29 DIAGNOSIS — E11.51 TYPE 2 DIABETES MELLITUS WITH PERIPHERAL VASCULAR DISEASE (H): ICD-10-CM

## 2019-03-29 DIAGNOSIS — E11.52 TYPE 2 DIABETES MELLITUS WITH DIABETIC PERIPHERAL ANGIOPATHY AND GANGRENE, WITH LONG-TERM CURRENT USE OF INSULIN (H): ICD-10-CM

## 2019-03-29 DIAGNOSIS — E11.42 TYPE 2 DIABETES MELLITUS WITH PERIPHERAL NEUROPATHY (H): Primary | ICD-10-CM

## 2019-03-29 LAB — HBA1C MFR BLD: 8.2 % (ref 0–5.6)

## 2019-03-29 PROCEDURE — 36415 COLL VENOUS BLD VENIPUNCTURE: CPT | Performed by: CLINICAL NURSE SPECIALIST

## 2019-03-29 PROCEDURE — 83036 HEMOGLOBIN GLYCOSYLATED A1C: CPT | Performed by: CLINICAL NURSE SPECIALIST

## 2019-03-29 PROCEDURE — 99214 OFFICE O/P EST MOD 30 MIN: CPT | Performed by: CLINICAL NURSE SPECIALIST

## 2019-03-29 RX ORDER — FLASH GLUCOSE SENSOR
1 KIT MISCELLANEOUS
Qty: 2 EACH | Refills: 11 | Status: SHIPPED | OUTPATIENT
Start: 2019-03-29 | End: 2020-05-07

## 2019-03-29 RX ORDER — PEN NEEDLE, DIABETIC 30 GX3/16"
1 NEEDLE, DISPOSABLE MISCELLANEOUS
Qty: 200 EACH | Refills: 11 | Status: SHIPPED | OUTPATIENT
Start: 2019-03-29 | End: 2020-04-01

## 2019-03-29 RX ORDER — FLASH GLUCOSE SENSOR
1 KIT MISCELLANEOUS
Qty: 2 EACH | Refills: 11 | Status: SHIPPED | OUTPATIENT
Start: 2019-03-29 | End: 2019-04-23

## 2019-03-29 RX ORDER — METFORMIN HCL 500 MG
TABLET, EXTENDED RELEASE 24 HR ORAL
Qty: 360 TABLET | Refills: 3 | Status: SHIPPED | OUTPATIENT
Start: 2019-03-29 | End: 2019-10-24

## 2019-03-29 NOTE — PATIENT INSTRUCTIONS
In an effort to stay on schedule, please remember to check in for your next clinic appointment 15-20 minutes early.  This is necessary so your insulin pump/blood glucose meter or CGM can be uploaded and any labs can be done if necessary.  We appreciate your understanding.    You can order covers for the corwin at [employer] - just search Corwin covers    There is a [email or web address] Kar for the phone - not sure when Android is available - check on it    If you are seeing low blood sugars during the overnight period, I would decrease the Tresiba dose to eliminate the lows.    Follow up  In 3 months  Jasmin Arteaga NP  Endocrinology

## 2019-03-29 NOTE — LETTER
3/29/2019         RE: Crispin Rojsa  [address] [address] [address]        Dear Colleague,    Thank you for referring your patient, Crispin Rojas, to the [facility]. Please see a copy of my visit note below.    Name: Crispin Rojas  Seen today for Diabetes (Last seen 10/11/2018).  HPI:  Crispin Rojas is a 59 year old male who presents for the management of:    1. Type 2 DM:  Originally diagnosed at the age of: 1997.  Insulin treated 15+ years.    Current Regimen: Metformin 1000 mg bid, Ozempic 1 mg weekly , Tresiba 55 units q am Humalog 35 units (full meal) or 17 units for a snack tid + 2:50>150.  Takes 1/2 dose of Tresiba if going to be very active.    Started Bydureon 5/2018 - tolerated well.    Changed to Ozempic 6/2018 - feels Ozempic is working better  Using personal CGM - Corwin    BS checks: 3-5 times per day, + Corwin  Meter Download:   Corwin CGM download: Average glucose 179; 51% above 180, 45% in target range , 4% below 70.  Recently on a cruise - higher glucose readings during that time    Working with diabetes ed - last seen by June Walker 1/14/2019 - discussed insulin pump therapy and upgrading to 14-day Corwin.  Wants to upgrade to 14-day sensor but not interested in insulin pump at this time    Taking gabapenin per neurology for ear/nerve problem.    Complications:   Diabetes Complications  Description / Detail    Diabetic Retinopathy   + diabetic retinopathy, next appointment with retinal specialist 6/21, last laser treatment 10/2016  Regular opthalmoologist visits annually   CAD / PAD  No   Neuropathy   Yes, sees Dr. Molina, last seen 2/12/2018 - no open foot lesions at this time.   Nephropathy / Microalbuminuria  Yes, elevated microalbumin   Gastroparesis  No   Hypoglycemia Unawareness  No       2. Hypertension: Blood Pressure today:   BP Readings from Last 3 Encounters:   03/29/19 150/76   01/17/19 130/72   01/10/19 138/82   .  Blood pressure  medications include Amlodipine 5 mg qd, lisinopril 20 mg qd.   3. Hyperlipidemia: Takes Atorvastatin 40 mg qd for lipid control.    4. Prevention:  Flu Shot- 10/2018  Pneumovax- 11/2015  Opthalmology-annaully  Dental-recommend semiannually  ASA-  Smoking-   PMH/PSH:  Past Medical History:   Diagnosis Date     Cerebral infarction (H)      Chronic infection      H/O heartburn      History of heartburn      Hypertension      Numbness and tingling      Obesity      GILA (obstructive sleep apnea) 10/22/2018     Renal stone      Type II or unspecified type diabetes mellitus without mention of complication, not stated as uncontrolled      Past Surgical History:   Procedure Laterality Date     AMPUTATE FOOT Left 8/18/2016    Procedure: AMPUTATE FOOT;  Surgeon: Jack Younger DPM;  Location: [facility]     AMPUTATE TOE(S) Right 2/1/2016    Procedure: AMPUTATE TOE(S);  Surgeon: Rachelle Manriquez DPM, Pod;  Location: [facility] OR     ANGIOGRAM       COLONOSCOPY       COMBINED CYSTOSCOPY, RETROGRADES, URETEROSCOPY, INSERT STENT Left 8/21/2016    Procedure: COMBINED CYSTOSCOPY, RETROGRADES, URETEROSCOPY, INSERT STENT;  Surgeon: Artemio Valenzuela MD;  Location: [facility] OR     COMBINED CYSTOSCOPY, RETROGRADES, URETEROSCOPY, LASER HOLMIUM LITHOTRIPSY URETER(S), INSERT STENT Left 10/3/2016    Procedure: COMBINED CYSTOSCOPY, RETROGRADES, URETEROSCOPY, LASER HOLMIUM LITHOTRIPSY URETER(S), INSERT STENT;  Surgeon: Artemio Valenzuela MD;  Location: [facility] OR     EXTRACORPOREAL SHOCK WAVE LITHOTRIPSY (ESWL) Left 9/8/2016    Procedure: EXTRACORPOREAL SHOCK WAVE LITHOTRIPSY (ESWL);  Surgeon: Artemio Valenzuela MD;  Location: [facility] OR     RECESSION GASTROCNEMIUS Right 2/1/2016    Procedure: RECESSION GASTROCNEMIUS;  Surgeon: Rachelle Manriquez DPM, Pod;  Location: [facility] OR     Family Hx:  Family History   Problem Relation Age of Onset     Arthritis Mother         SLE     Connective Tissue Disorder Mother         lupus     Diabetes Mother       Cerebrovascular Disease Mother      Cancer Mother      Diabetes Father      Diabetes Maternal Grandfather      Diabetes Sister      Thyroid disease:          DM2: Yes: Strong family history on mothers side - mom, 7 maternal uncles, two maternal aunts, + MGF.         Autoimmune: DM1, SLE, RA, Vitiligo Yes: Lupus    Social Hx:  Social History     Socioeconomic History     Marital status:      Spouse name: Sydney     Number of children: 2     Years of education: Not on file     Highest education level: Not on file   Occupational History     Occupation: marketing     Employer: [employer]     Comment: [employer]   Social Needs     Financial resource strain: Not on file     Food insecurity:     Worry: Not on file     Inability: Not on file     Transportation needs:     Medical: Not on file     Non-medical: Not on file   Tobacco Use     Smoking status: Never Smoker     Smokeless tobacco: Never Used   Substance and Sexual Activity     Alcohol use: Yes     Alcohol/week: 0.0 oz     Comment: rare---red wine 2x per week     Drug use: No     Sexual activity: Yes     Partners: Female   Lifestyle     Physical activity:     Days per week: Not on file     Minutes per session: Not on file     Stress: Not on file   Relationships     Social connections:     Talks on phone: Not on file     Gets together: Not on file     Attends Evangelical service: Not on file     Active member of club or organization: Not on file     Attends meetings of clubs or organizations: Not on file     Relationship status: Not on file     Intimate partner violence:     Fear of current or ex partner: Not on file     Emotionally abused: Not on file     Physically abused: Not on file     Forced sexual activity: Not on file   Other Topics Concern     Parent/sibling w/ CABG, MI or angioplasty before 65F 55M? No   Social History Narrative     Not on file          MEDICATIONS:  has a current medication list which includes the following prescription(s): freestyle  lilly 14 day sensor, freestyle lilly 14 day sensor, insulin degludec, insulin lispro, pen needles, metformin, semaglutide, amlodipine, atorvastatin, blood glucose, blood glucose monitoring, blood glucose, cholecalciferol, cialis, cialis, co-enzyme q10, gabapentin, ketoconazole, ketoconazole, lisinopril, multiple vitamins-minerals, omega-3 fatty acids, and tadalafil.    ROS     ROS: 10 point ROS neg other than the symptoms noted above in the HPI.    Physical Exam   VS: /76 (BP Location: Left arm, Patient Position: Chair, Cuff Size: Adult Large)   Pulse 54   Resp 12   Wt 109 kg (240 lb 4.8 oz)   SpO2 97%   BMI 33.75 kg/m     GENERAL: NAD, well dressed, answering questions appropriately, appears stated age.  HEENT: no exopthalmous, no proptosis, no lig lag, no retraction, no scleral icterus  RESPIRATORY: Clear bilaterally.  Normal respiratory effort.  CARDIOVASCULAR: RRR  NEUROLOGY: CN grossly intact, no tremors  MSK: grossly intact. Normal gait and station.  PSYCH: Intact judgment and insight. A&OX3 with a cordial affect.    LABS:  A1c:   Component      Latest Ref Rng & Units 10/11/2018 1/10/2019 3/29/2019   Hemoglobin A1C      0 - 5.6 % 7.6 (H) 7.5 (H) 8.2 (H)     Basic Metabolic Panel:  !COMPREHENSIVE Latest Ref Rng & Units 1/10/2019   SODIUM 133 - 144 mmol/L 143   POTASSIUM 3.4 - 5.3 mmol/L 4.2   CHLORIDE 94 - 109 mmol/L 110 (H)   CO2 mmol/L    BUN 7 - 30 mg/dL 12   Creatinine 0.66 - 1.25 mg/dL    Creatinine 0.66 - 1.25 mg/dL 0.89   Glucose 70 - 99 mg/dL 100 (H)   ANION GAP 3 - 14 mmol/L 10   CALCIUM 8.5 - 10.1 mg/dL 8.8   ALBUMIN 3.4 - 5.0 g/dL 4.0     LFTS/Cholesterol Panel:  !LIPID/HEPATIC Latest Ref Rng & Units 1/10/2019   CHOLESTEROL <200 mg/dL 96   TRIGLYCERIDES <150 mg/dL 89   HDL CHOLESTEROL >39 mg/dL 37 (L)   LDL CHOLESTEROL DIRECT 0 - 129 mg/dL    LDL CHOLESTEROL, CALCULATED <100 mg/dL 41   VLDL-CHOLESTEROL 0 - 30 mg/dL    NON HDL CHOLESTEROL <130 mg/dL 59   CHOLESTEROL/HDL RATIO 0.0 - 5.0     AST 0 - 45 U/L 67 (H)   ALT 0 - 70 U/L 82 (H)     Thyroid Function:   !THYROID Latest Ref Rng & Units 12/27/2017   TSH 0.40 - 4.00 mU/L 1.78     Urine MicroAlbumin:  Component      Latest Ref Rng & Units 1/10/2019   Creatinine Urine      mg/dL 240   Albumin Urine mg/L      mg/L 40   Albumin Urine mg/g Cr      0 - 17 mg/g Cr 16.83     Vitamin D:    All pertinent notes, labs, and images personally reviewed by me.     A/P  Mr.Patrick AKASH Rojas is a 59 year old here for the evaluation/management of diabetes:    1. DM2 - Uncontrolled  Diabetes is complicated by diabetic retinopathy, nephropathy, and neuropathy.  Elevated A1c likely due to dietary indiscretions while on vacation.  Continue Metformin 1000 mg bid,  Continue Tresiba 55 units daily for now but consider decreasing dose if he notes nocturnal hypoglycemia.  Continue current dose of Humalog and Ozempic 1 mg daily.  Upgrade to Corwin 14-day     2. Hypertension - Historically controlled. BP elevated today.  See PCP if remains elevated.    3. Hyperlipidemia - On statin therapy    Labs ordered today:   Orders Placed This Encounter   Procedures     Hemoglobin A1c       Radiology/Consults ordered today: None    All questions were answered.  The patient indicates understanding of the above issues and agrees with the plan set forth.  Total face to face time greater than or equal to 25 minutes.       Follow-up:  3 months    Jasmin Arteaga NP  Endocrinology  [facility]  CC:     Again, thank you for allowing me to participate in the care of your patient.        Sincerely,        SARAH Raymundo CNP

## 2019-03-29 NOTE — PROGRESS NOTES
Name: Crispin Rojas  Seen today for Diabetes (Last seen 10/11/2018).  HPI:  Crispin Rojas is a 59 year old male who presents for the management of:    1. Type 2 DM:  Originally diagnosed at the age of: 1997.  Insulin treated 15+ years.    Current Regimen: Metformin 1000 mg bid, Ozempic 1 mg weekly , Tresiba 55 units q am Humalog 35 units (full meal) or 17 units for a snack tid + 2:50>150.  Takes 1/2 dose of Tresiba if going to be very active.    Started Bydureon 5/2018 - tolerated well.    Changed to Ozempic 6/2018 - feels Ozempic is working better  Using personal CGM - Corwin    BS checks: 3-5 times per day, + Corwin  Meter Download:   Corwin CGM download: Average glucose 179; 51% above 180, 45% in target range , 4% below 70.  Recently on a cruise - higher glucose readings during that time    Working with diabetes ed - last seen by June Walker 1/14/2019 - discussed insulin pump therapy and upgrading to 14-day Corwin.  Wants to upgrade to 14-day sensor but not interested in insulin pump at this time    Taking gabapenin per neurology for ear/nerve problem.    Complications:   Diabetes Complications  Description / Detail    Diabetic Retinopathy   + diabetic retinopathy, next appointment with retinal specialist 6/21, last laser treatment 10/2016  Regular opthalmoologist visits annually   CAD / PAD  No   Neuropathy   Yes, sees Dr. Molina, last seen 2/12/2018 - no open foot lesions at this time.   Nephropathy / Microalbuminuria  Yes, elevated microalbumin   Gastroparesis  No   Hypoglycemia Unawareness  No       2. Hypertension: Blood Pressure today:   BP Readings from Last 3 Encounters:   03/29/19 150/76   01/17/19 130/72   01/10/19 138/82   .  Blood pressure medications include Amlodipine 5 mg qd, lisinopril 20 mg qd.   3. Hyperlipidemia: Takes Atorvastatin 40 mg qd for lipid control.    4. Prevention:  Flu Shot- 10/2018  Pneumovax- 11/2015  Opthalmology-annaully  Dental-recommend semiannually  ASA-  Smoking-    PMH/PSH:  Past Medical History:   Diagnosis Date     Cerebral infarction (H)      Chronic infection      H/O heartburn      History of heartburn      Hypertension      Numbness and tingling      Obesity      GILA (obstructive sleep apnea) 10/22/2018     Renal stone      Type II or unspecified type diabetes mellitus without mention of complication, not stated as uncontrolled      Past Surgical History:   Procedure Laterality Date     AMPUTATE FOOT Left 8/18/2016    Procedure: AMPUTATE FOOT;  Surgeon: Jack Younger DPM;  Location: [facility] OR     AMPUTATE TOE(S) Right 2/1/2016    Procedure: AMPUTATE TOE(S);  Surgeon: Rachelle Manriquez DPM, Pod;  Location: [facility] OR     ANGIOGRAM       COLONOSCOPY       COMBINED CYSTOSCOPY, RETROGRADES, URETEROSCOPY, INSERT STENT Left 8/21/2016    Procedure: COMBINED CYSTOSCOPY, RETROGRADES, URETEROSCOPY, INSERT STENT;  Surgeon: Artemio Valenzuela MD;  Location: [facility] OR     COMBINED CYSTOSCOPY, RETROGRADES, URETEROSCOPY, LASER HOLMIUM LITHOTRIPSY URETER(S), INSERT STENT Left 10/3/2016    Procedure: COMBINED CYSTOSCOPY, RETROGRADES, URETEROSCOPY, LASER HOLMIUM LITHOTRIPSY URETER(S), INSERT STENT;  Surgeon: Artemio Valenzuela MD;  Location: [facility] OR     EXTRACORPOREAL SHOCK WAVE LITHOTRIPSY (ESWL) Left 9/8/2016    Procedure: EXTRACORPOREAL SHOCK WAVE LITHOTRIPSY (ESWL);  Surgeon: Artemio Valenzuela MD;  Location: [facility] OR     RECESSION GASTROCNEMIUS Right 2/1/2016    Procedure: RECESSION GASTROCNEMIUS;  Surgeon: Rachelle Manriquez DPM, Pod;  Location: [facility] OR     Family Hx:  Family History   Problem Relation Age of Onset     Arthritis Mother         SLE     Connective Tissue Disorder Mother         lupus     Diabetes Mother      Cerebrovascular Disease Mother      Cancer Mother      Diabetes Father      Diabetes Maternal Grandfather      Diabetes Sister      Thyroid disease:          DM2: Yes: Strong family history on mothers side - mom, 7 maternal uncles, two maternal aunts, +  MGF.         Autoimmune: DM1, SLE, RA, Vitiligo Yes: Lupus    Social Hx:  Social History     Socioeconomic History     Marital status:      Spouse name: Sydney     Number of children: 2     Years of education: Not on file     Highest education level: Not on file   Occupational History     Occupation: marketing     Employer: [employer]     Comment: [employer]   Social Needs     Financial resource strain: Not on file     Food insecurity:     Worry: Not on file     Inability: Not on file     Transportation needs:     Medical: Not on file     Non-medical: Not on file   Tobacco Use     Smoking status: Never Smoker     Smokeless tobacco: Never Used   Substance and Sexual Activity     Alcohol use: Yes     Alcohol/week: 0.0 oz     Comment: rare---red wine 2x per week     Drug use: No     Sexual activity: Yes     Partners: Female   Lifestyle     Physical activity:     Days per week: Not on file     Minutes per session: Not on file     Stress: Not on file   Relationships     Social connections:     Talks on phone: Not on file     Gets together: Not on file     Attends Zoroastrian service: Not on file     Active member of club or organization: Not on file     Attends meetings of clubs or organizations: Not on file     Relationship status: Not on file     Intimate partner violence:     Fear of current or ex partner: Not on file     Emotionally abused: Not on file     Physically abused: Not on file     Forced sexual activity: Not on file   Other Topics Concern     Parent/sibling w/ CABG, MI or angioplasty before 65F 55M? No   Social History Narrative     Not on file          MEDICATIONS:  has a current medication list which includes the following prescription(s): freestyle lilly 14 day sensor, freestyle lilly 14 day sensor, insulin degludec, insulin lispro, pen needles, metformin, semaglutide, amlodipine, atorvastatin, blood glucose, blood glucose monitoring, blood glucose, cholecalciferol, cialis, cialis, co-enzyme q10,  gabapentin, ketoconazole, ketoconazole, lisinopril, multiple vitamins-minerals, omega-3 fatty acids, and tadalafil.    ROS     ROS: 10 point ROS neg other than the symptoms noted above in the HPI.    Physical Exam   VS: /76 (BP Location: Left arm, Patient Position: Chair, Cuff Size: Adult Large)   Pulse 54   Resp 12   Wt 109 kg (240 lb 4.8 oz)   SpO2 97%   BMI 33.75 kg/m    GENERAL: NAD, well dressed, answering questions appropriately, appears stated age.  HEENT: no exopthalmous, no proptosis, no lig lag, no retraction, no scleral icterus  RESPIRATORY: Clear bilaterally.  Normal respiratory effort.  CARDIOVASCULAR: RRR  NEUROLOGY: CN grossly intact, no tremors  MSK: grossly intact. Normal gait and station.  PSYCH: Intact judgment and insight. A&OX3 with a cordial affect.    LABS:  A1c:   Component      Latest Ref Rng & Units 10/11/2018 1/10/2019 3/29/2019   Hemoglobin A1C      0 - 5.6 % 7.6 (H) 7.5 (H) 8.2 (H)     Basic Metabolic Panel:  !COMPREHENSIVE Latest Ref Rng & Units 1/10/2019   SODIUM 133 - 144 mmol/L 143   POTASSIUM 3.4 - 5.3 mmol/L 4.2   CHLORIDE 94 - 109 mmol/L 110 (H)   CO2 mmol/L    BUN 7 - 30 mg/dL 12   Creatinine 0.66 - 1.25 mg/dL    Creatinine 0.66 - 1.25 mg/dL 0.89   Glucose 70 - 99 mg/dL 100 (H)   ANION GAP 3 - 14 mmol/L 10   CALCIUM 8.5 - 10.1 mg/dL 8.8   ALBUMIN 3.4 - 5.0 g/dL 4.0     LFTS/Cholesterol Panel:  !LIPID/HEPATIC Latest Ref Rng & Units 1/10/2019   CHOLESTEROL <200 mg/dL 96   TRIGLYCERIDES <150 mg/dL 89   HDL CHOLESTEROL >39 mg/dL 37 (L)   LDL CHOLESTEROL DIRECT 0 - 129 mg/dL    LDL CHOLESTEROL, CALCULATED <100 mg/dL 41   VLDL-CHOLESTEROL 0 - 30 mg/dL    NON HDL CHOLESTEROL <130 mg/dL 59   CHOLESTEROL/HDL RATIO 0.0 - 5.0    AST 0 - 45 U/L 67 (H)   ALT 0 - 70 U/L 82 (H)     Thyroid Function:   !THYROID Latest Ref Rng & Units 12/27/2017   TSH 0.40 - 4.00 mU/L 1.78     Urine MicroAlbumin:  Component      Latest Ref Rng & Units 1/10/2019   Creatinine Urine      mg/dL 240    Albumin Urine mg/L      mg/L 40   Albumin Urine mg/g Cr      0 - 17 mg/g Cr 16.83     Vitamin D:    All pertinent notes, labs, and images personally reviewed by me.     A/P  Mr.Patrick AKASH Rojas is a 59 year old here for the evaluation/management of diabetes:    1. DM2 - Uncontrolled  Diabetes is complicated by diabetic retinopathy, nephropathy, and neuropathy.  Elevated A1c likely due to dietary indiscretions while on vacation.  Continue Metformin 1000 mg bid,  Continue Tresiba 55 units daily for now but consider decreasing dose if he notes nocturnal hypoglycemia.  Continue current dose of Humalog and Ozempic 1 mg daily.  Upgrade to Corwin 14-day     2. Hypertension - Historically controlled. BP elevated today.  See PCP if remains elevated.    3. Hyperlipidemia - On statin therapy    Labs ordered today:   Orders Placed This Encounter   Procedures     Hemoglobin A1c       Radiology/Consults ordered today: None    All questions were answered.  The patient indicates understanding of the above issues and agrees with the plan set forth.  Total face to face time greater than or equal to 25 minutes.       Follow-up:  3 months    Jasmin Arteaga NP  Endocrinology  [facility]  CC:

## 2019-04-03 NOTE — RESULT ENCOUNTER NOTE
Crispin,  Here's a copy of your recent A1c result for your records.  Jasmin Arteaga NP  Endocrinology

## 2019-04-23 ENCOUNTER — OFFICE VISIT (OUTPATIENT)
Dept: FAMILY MEDICINE | Facility: CLINIC | Age: 59
End: 2019-04-23
Payer: COMMERCIAL

## 2019-04-23 VITALS
TEMPERATURE: 97.9 F | DIASTOLIC BLOOD PRESSURE: 82 MMHG | OXYGEN SATURATION: 100 % | WEIGHT: 240 LBS | SYSTOLIC BLOOD PRESSURE: 126 MMHG | BODY MASS INDEX: 33.6 KG/M2 | RESPIRATION RATE: 16 BRPM | HEART RATE: 73 BPM | HEIGHT: 71 IN

## 2019-04-23 DIAGNOSIS — M54.42 ACUTE LEFT-SIDED LOW BACK PAIN WITH LEFT-SIDED SCIATICA: Primary | ICD-10-CM

## 2019-04-23 PROCEDURE — 99213 OFFICE O/P EST LOW 20 MIN: CPT | Performed by: PHYSICIAN ASSISTANT

## 2019-04-23 RX ORDER — METHYLPREDNISOLONE 4 MG
TABLET, DOSE PACK ORAL
Qty: 21 TABLET | Refills: 0 | Status: SHIPPED | OUTPATIENT
Start: 2019-04-23 | End: 2019-06-24

## 2019-04-23 RX ORDER — CYCLOBENZAPRINE HCL 10 MG
10 TABLET ORAL 3 TIMES DAILY PRN
Qty: 30 TABLET | Refills: 0 | Status: SHIPPED | OUTPATIENT
Start: 2019-04-23 | End: 2019-06-24

## 2019-04-23 ASSESSMENT — MIFFLIN-ST. JEOR: SCORE: 1927.82

## 2019-04-23 NOTE — PATIENT INSTRUCTIONS
Take the complete course of the steroid. Monitor blood sugars closely and adjust insulin dosing as needed.    Take Flexeril up to 3 times a day as needed for pain.    You can take Tylenol and continue to apply topical creams as helpful.    Follow-up if not improving in 1-2 weeks or sooner if worsening.

## 2019-04-23 NOTE — PROGRESS NOTES
SUBJECTIVE:   Crispin Rojas is a 59 year old male who presents to clinic today for the following   health issues:      Back Pain       Duration: 4 days         Specific cause: turning/bending    Description:   Location of pain: low back both  Character of pain: dull and constant   Pain radiation: radiates into the left leg  New numbness or weakness in legs, not attributed to pain: no    Intensity: Currently 3/10    History:   Pain interferes with job: No  History of back problems: yes years ago   Any previous MRI or X-rays: None  Sees a specialist for back pain:  No  Therapies tried without relief: Lidoderm cream     Alleviating factors:   Improved by: none       Precipitating factors:  Worsened by: Lifting, Bending, Standing, Sitting, Lying Flat and Walking    Started when bending over on Saturday. Also injured it while moving his sister 2 weeks ago. Hurt it the first day but the other 2 days of moving it didn't bother him.       Accompanying Signs & Symptoms:  Risk of Fracture:  None  Risk of Cauda Equina:  None  Risk of Infection:  None  Risk of Cancer:  None  Risk of Ankylosing Spondylitis:  Onset at age <35, male, AND morning back stiffness. no       Additional history: as documented    Reviewed  and updated as needed this visit by clinical staff         Reviewed and updated as needed this visit by Provider         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     CARDIOVASCULAR SCREENING; LDL GOAL LESS THAN 100     Morbid obesity, unspecified obesity type (H)     Other specified transient cerebral ischemias     Calculus of kidney     Chronic left shoulder pain     Cervicalgia     Type 2 diabetes mellitus with peripheral neuropathy (H)     Hyperlipidemia LDL goal <100     Benign essential hypertension     Right bundle branch block     GILA (obstructive sleep apnea)     Past Surgical History:   Procedure Laterality Date     AMPUTATE FOOT Left 8/18/2016     Procedure: AMPUTATE FOOT;  Surgeon: Jack Younger DPM;  Location: [facility]     AMPUTATE TOE(S) Right 2/1/2016    Procedure: AMPUTATE TOE(S);  Surgeon: Rachelle Manriquez DPM, Pod;  Location: [facility]     ANGIOGRAM       COLONOSCOPY       COMBINED CYSTOSCOPY, RETROGRADES, URETEROSCOPY, INSERT STENT Left 8/21/2016    Procedure: COMBINED CYSTOSCOPY, RETROGRADES, URETEROSCOPY, INSERT STENT;  Surgeon: Artemio Valenzuela MD;  Location: [facility]     COMBINED CYSTOSCOPY, RETROGRADES, URETEROSCOPY, LASER HOLMIUM LITHOTRIPSY URETER(S), INSERT STENT Left 10/3/2016    Procedure: COMBINED CYSTOSCOPY, RETROGRADES, URETEROSCOPY, LASER HOLMIUM LITHOTRIPSY URETER(S), INSERT STENT;  Surgeon: Artemio Valenzuela MD;  Location: [facility]     EXTRACORPOREAL SHOCK WAVE LITHOTRIPSY (ESWL) Left 9/8/2016    Procedure: EXTRACORPOREAL SHOCK WAVE LITHOTRIPSY (ESWL);  Surgeon: Artemio Valenzuela MD;  Location: [facility]     RECESSION GASTROCNEMIUS Right 2/1/2016    Procedure: RECESSION GASTROCNEMIUS;  Surgeon: Rachelle Manriquez DPM, Pod;  Location: [facility]       Social History     Tobacco Use     Smoking status: Never Smoker     Smokeless tobacco: Never Used   Substance Use Topics     Alcohol use: Yes     Alcohol/week: 0.0 oz     Comment: rare---red wine 2x per week     Family History   Problem Relation Age of Onset     Arthritis Mother         SLE     Connective Tissue Disorder Mother         lupus     Diabetes Mother      Cerebrovascular Disease Mother      Cancer Mother      Diabetes Father      Diabetes Maternal Grandfather      Diabetes Sister          Current Outpatient Medications   Medication Sig Dispense Refill     amLODIPine (NORVASC) 5 MG tablet TAKE 1 TABLET BY MOUTH EVERY DAY 90 tablet 0     atorvastatin (LIPITOR) 40 MG tablet TAKE 1 TABLET BY MOUTH EVERY DAY 90 tablet 2     blood glucose (NO BRAND SPECIFIED) lancets standard Use to test blood sugar 3 times daily or as directed. 300 each 3     blood glucose monitoring (NO BRAND  SPECIFIED) meter device kit Use to test blood sugar 3 times daily or as directed. 1 kit 0     blood glucose monitoring (NO BRAND SPECIFIED) test strip Use to test blood sugars 3 times daily or as directed 300 strip 3     Cholecalciferol (VITAMIN D3 PO) Take 1,000 Units by mouth daily        CIALIS 5 MG tablet TAKE 1 TABLET BY MOUTH EVERY DAY AS NEEDED 30 tablet 0     CIALIS 5 MG tablet TAKE 1 TABLET BY MOUTH EVERY DAY AS NEEDED 30 tablet 0     Co-Enzyme Q10 100 MG CAPS Take 100 mg by mouth daily        Continuous Blood Gluc Sensor (FREESTYLE LUCERO 14 DAY SENSOR) MISC 1 each every 14 days 2 each 11     cyclobenzaprine (FLEXERIL) 10 MG tablet Take 1 tablet (10 mg) by mouth 3 times daily as needed for muscle spasms 30 tablet 0     gabapentin (NEURONTIN) 100 MG capsule Take 300 mg by mouth At Bedtime       insulin degludec (TRESIBA FLEXTOUCH) 100 UNIT/ML pen Inject 55 Units Subcutaneous daily 60 mL 3     insulin lispro (HUMALOG KWIKPEN) 100 UNIT/ML pen INJECT 35 UNITS BEFORE BREAKFAST, LUNCH AND DINNER 105 mL 3     Insulin Pen Needle (PEN NEEDLES) 31G X 5 MM MISC 1 Device 5 times daily , TWICE DAILY WITH LANTUS AND 3 TIMES A DAY PRN WITH NOVOLOG FLEXPEN 200 each 11     ketoconazole (NIZORAL) 2 % external shampoo APPLY TO THE AFFECTED AREA AND THEN WASH OFF AFTER 5 MINUTES. 120 mL 0     lisinopril (PRINIVIL/ZESTRIL) 20 MG tablet Take 1 tablet (20 mg) by mouth daily 90 tablet 0     metFORMIN (GLUCOPHAGE-XR) 500 MG 24 hr tablet TAKE 2 TABLETS BY MOUTH TWICE DAILY WITH MEALS 360 tablet 3     methylPREDNISolone (MEDROL DOSEPAK) 4 MG tablet therapy pack Follow Package Directions 21 tablet 0     Multiple Vitamins-Minerals (MULTIVITAMIN PO) Take 1 tablet by mouth daily        Omega-3 Fatty Acids (OMEGA-3 FISH OIL PO) Take 2 g by mouth daily        semaglutide (OZEMPIC) 1 MG/DOSE pen INJECT 1MG UNDER SKIN ONCE WEEKLY 6 pen 3     tadalafil (CIALIS) 5 MG tablet TAKE 1 TABLET BY MOUTH EVERY DAY AS NEEDED 30 tablet 1     Allergies  "  Allergen Reactions     Niacin Swelling     SIMCOR caused edema in extremities     Simvastatin Swelling     SIMCOR caused edema in extremities       ROS:  Constitutional, HEENT, cardiovascular, pulmonary, gi and gu systems are negative, except as otherwise noted.    OBJECTIVE:     /82 (BP Location: Right arm, Patient Position: Chair, Cuff Size: Adult Large)   Pulse 73   Temp 97.9  F (36.6  C)   Resp 16   Ht 1.791 m (5' 10.5\")   Wt 108.9 kg (240 lb)   SpO2 100%   BMI 33.95 kg/m    Body mass index is 33.95 kg/m .  GENERAL: healthy, alert and no distress  EYES: Eyes grossly normal to inspection, PERRL and conjunctivae and sclerae normal  MS: no gross musculoskeletal defects noted, no edema  SKIN: no suspicious lesions or rashes  NEURO: Normal strength and tone, mentation intact and speech normal  Comprehensive back pain exam:  Tenderness of left SI joint, Pain limits the following motions: flexion, and extension, Lower extremity strength functional and equal on both sides, Lower extremity sensation normal and equal on both sides and Straight leg positive on  left, indicating possible ipsilateral radiculopathy  PSYCH: mentation appears normal, affect normal/bright    Diagnostic Test Results:  none     ASSESSMENT/PLAN:       (M54.42) Acute left-sided low back pain with left-sided sciatica  (primary encounter diagnosis)    Comment: Treat with Flexeril and medrol. Continue to use ice and heat.     Plan: methylPREDNISolone (MEDROL DOSEPAK) 4 MG tablet        therapy pack, cyclobenzaprine (FLEXERIL) 10 MG         tablet              Patient Instructions   Take the complete course of the steroid. Monitor blood sugars closely and adjust insulin dosing as needed.    Take Flexeril up to 3 times a day as needed for pain.    You can take Tylenol and continue to apply topical creams as helpful.    Follow-up if not improving in 1-2 weeks or sooner if worsening.       Oscar Naqvi PA-C  [facility]

## 2019-04-29 ENCOUNTER — OFFICE VISIT (OUTPATIENT)
Dept: CARDIOLOGY | Facility: CLINIC | Age: 59
End: 2019-04-29
Attending: FAMILY MEDICINE
Payer: COMMERCIAL

## 2019-04-29 VITALS
HEIGHT: 71 IN | HEART RATE: 60 BPM | WEIGHT: 244.6 LBS | BODY MASS INDEX: 34.24 KG/M2 | DIASTOLIC BLOOD PRESSURE: 72 MMHG | SYSTOLIC BLOOD PRESSURE: 142 MMHG

## 2019-04-29 DIAGNOSIS — I10 ESSENTIAL HYPERTENSION: ICD-10-CM

## 2019-04-29 DIAGNOSIS — I71.20 THORACIC AORTIC ANEURYSM WITHOUT RUPTURE (H): ICD-10-CM

## 2019-04-29 DIAGNOSIS — I45.10 RIGHT BUNDLE BRANCH BLOCK: Primary | ICD-10-CM

## 2019-04-29 DIAGNOSIS — E11.42 TYPE 2 DIABETES MELLITUS WITH PERIPHERAL NEUROPATHY (H): ICD-10-CM

## 2019-04-29 PROCEDURE — 99204 OFFICE O/P NEW MOD 45 MIN: CPT | Performed by: INTERNAL MEDICINE

## 2019-04-29 ASSESSMENT — MIFFLIN-ST. JEOR: SCORE: 1948.69

## 2019-04-29 NOTE — LETTER
4/29/2019    Aden Lopez MD  [id number]  [facility] [address]    RE: Crispin Rojas       Dear Colleague,    I had the pleasure of seeing Crispin Rojas in the [facility].    HPI and Plan:   See dictation:[id number]    Orders Placed This Encounter   Procedures     Follow-Up with Cardiologist     Echocardiogram Complete       No orders of the defined types were placed in this encounter.      Medications Discontinued During This Encounter   Medication Reason     CIALIS 5 MG tablet Medication Reconciliation Clean Up         Encounter Diagnoses   Name Primary?     Right bundle branch block Yes     Thoracic aortic aneurysm without rupture (H)      Essential hypertension      Type 2 diabetes mellitus with peripheral neuropathy (H)        CURRENT MEDICATIONS:  Current Outpatient Medications   Medication Sig Dispense Refill     amLODIPine (NORVASC) 5 MG tablet TAKE 1 TABLET BY MOUTH EVERY DAY 90 tablet 0     atorvastatin (LIPITOR) 40 MG tablet TAKE 1 TABLET BY MOUTH EVERY DAY 90 tablet 2     blood glucose (NO BRAND SPECIFIED) lancets standard Use to test blood sugar 3 times daily or as directed. 300 each 3     blood glucose monitoring (NO BRAND SPECIFIED) meter device kit Use to test blood sugar 3 times daily or as directed. 1 kit 0     blood glucose monitoring (NO BRAND SPECIFIED) test strip Use to test blood sugars 3 times daily or as directed 300 strip 3     Cholecalciferol (VITAMIN D3 PO) Take 1,000 Units by mouth daily        CIALIS 5 MG tablet TAKE 1 TABLET BY MOUTH EVERY DAY AS NEEDED 30 tablet 0     Co-Enzyme Q10 100 MG CAPS Take 100 mg by mouth daily        Continuous Blood Gluc Sensor (FREESTYLE LUCERO 14 DAY SENSOR) MISC 1 each every 14 days 2 each 11     cyclobenzaprine (FLEXERIL) 10 MG tablet Take 1 tablet (10 mg) by mouth 3 times daily as needed for muscle spasms 30 tablet 0     gabapentin (NEURONTIN) 100 MG capsule Take 300 mg by mouth At Bedtime       insulin  degludec (TRESIBA FLEXTOUCH) 100 UNIT/ML pen Inject 55 Units Subcutaneous daily 60 mL 3     insulin lispro (HUMALOG KWIKPEN) 100 UNIT/ML pen INJECT 35 UNITS BEFORE BREAKFAST, LUNCH AND DINNER 105 mL 3     Insulin Pen Needle (PEN NEEDLES) 31G X 5 MM MISC 1 Device 5 times daily , TWICE DAILY WITH LANTUS AND 3 TIMES A DAY PRN WITH NOVOLOG FLEXPEN 200 each 11     ketoconazole (NIZORAL) 2 % external shampoo APPLY TO THE AFFECTED AREA AND THEN WASH OFF AFTER 5 MINUTES. 120 mL 0     lisinopril (PRINIVIL/ZESTRIL) 20 MG tablet Take 1 tablet (20 mg) by mouth daily 90 tablet 0     metFORMIN (GLUCOPHAGE-XR) 500 MG 24 hr tablet TAKE 2 TABLETS BY MOUTH TWICE DAILY WITH MEALS 360 tablet 3     methylPREDNISolone (MEDROL DOSEPAK) 4 MG tablet therapy pack Follow Package Directions 21 tablet 0     Multiple Vitamins-Minerals (MULTIVITAMIN PO) Take 1 tablet by mouth daily        Omega-3 Fatty Acids (OMEGA-3 FISH OIL PO) Take 2 g by mouth daily        semaglutide (OZEMPIC) 1 MG/DOSE pen INJECT 1MG UNDER SKIN ONCE WEEKLY 6 pen 3     tadalafil (CIALIS) 5 MG tablet TAKE 1 TABLET BY MOUTH EVERY DAY AS NEEDED 30 tablet 1       ALLERGIES     Allergies   Allergen Reactions     Niacin Swelling     SIMCOR caused edema in extremities     Simvastatin Swelling     SIMCOR caused edema in extremities       PAST MEDICAL HISTORY:  Past Medical History:   Diagnosis Date     Cerebral infarction (H)      Chronic infection      H/O heartburn      History of heartburn      Hypertension      Numbness and tingling      Obesity      GILA (obstructive sleep apnea) 10/22/2018     Renal stone      Type II or unspecified type diabetes mellitus without mention of complication, not stated as uncontrolled        PAST SURGICAL HISTORY:  Past Surgical History:   Procedure Laterality Date     AMPUTATE FOOT Left 8/18/2016    Procedure: AMPUTATE FOOT;  Surgeon: Jack Younger DPM;  Location: [facility]     AMPUTATE TOE(S) Right 2/1/2016    Procedure: AMPUTATE TOE(S);   Surgeon: Rachelle Manriquez DPM, Pod;  Location: [facility]     ANGIOGRAM       COLONOSCOPY       COMBINED CYSTOSCOPY, RETROGRADES, URETEROSCOPY, INSERT STENT Left 8/21/2016    Procedure: COMBINED CYSTOSCOPY, RETROGRADES, URETEROSCOPY, INSERT STENT;  Surgeon: Artemio Valenzuela MD;  Location: [facility]     COMBINED CYSTOSCOPY, RETROGRADES, URETEROSCOPY, LASER HOLMIUM LITHOTRIPSY URETER(S), INSERT STENT Left 10/3/2016    Procedure: COMBINED CYSTOSCOPY, RETROGRADES, URETEROSCOPY, LASER HOLMIUM LITHOTRIPSY URETER(S), INSERT STENT;  Surgeon: Artemio Valenzuela MD;  [facility]     EXTRACORPOREAL SHOCK WAVE LITHOTRIPSY (ESWL) Left 9/8/2016    Procedure: EXTRACORPOREAL SHOCK WAVE LITHOTRIPSY (ESWL);  Surgeon: Artemio Valenzuela MD;  Location: [facility]     RECESSION GASTROCNEMIUS Right 2/1/2016    Procedure: RECESSION GASTROCNEMIUS;  Surgeon: Rachelle Manriquez DPM, Pod;  Location: [facility]       FAMILY HISTORY:  Family History   Problem Relation Age of Onset     Arthritis Mother         SLE     Connective Tissue Disorder Mother         lupus     Diabetes Mother      Cerebrovascular Disease Mother      Cancer Mother      Diabetes Father      Diabetes Maternal Grandfather      Diabetes Sister        SOCIAL HISTORY:  Social History     Socioeconomic History     Marital status:      Spouse name: Sydney     Number of children: 2     Years of education: None     Highest education level: None   Occupational History     Occupation: marketing     Employer: [employer]     Comment: [employer]   Social Needs     Financial resource strain: None     Food insecurity:     Worry: None     Inability: None     Transportation needs:     Medical: None     Non-medical: None   Tobacco Use     Smoking status: Never Smoker     Smokeless tobacco: Never Used   Substance and Sexual Activity     Alcohol use: Yes     Alcohol/week: 0.0 oz     Comment: rare---red wine 2x per week     Drug use: No     Sexual activity: Yes     Partners: Female  "  Lifestyle     Physical activity:     Days per week: None     Minutes per session: None     Stress: None   Relationships     Social connections:     Talks on phone: None     Gets together: None     Attends [employer] service: None     Active member of club or organization: None     Attends meetings of clubs or organizations: None     Relationship status: None     Intimate partner violence:     Fear of current or ex partner: None     Emotionally abused: None     Physically abused: None     Forced sexual activity: None   Other Topics Concern     Parent/sibling w/ CABG, MI or angioplasty before 65F 55M? No   Social History Narrative     None       Review of Systems:  Skin:  Positive for rash     Eyes:  Positive for glasses    ENT:  Negative      Respiratory:  Positive for sleep apnea;CPAP     Cardiovascular:  Negative      Gastroenterology: Positive for vomiting occ vomiting in the am- has not happened in a \"long time\"  Genitourinary:  Negative      Musculoskeletal:  Positive for back pain    Neurologic:  Negative      Psychiatric:  Negative      Heme/Lymph/Imm:  Negative      Endocrine:  Positive for diabetes      Physical Exam:  Vitals: /72 (BP Location: Right arm, Patient Position: Sitting, Cuff Size: Adult Large)   Pulse 60   Ht 1.791 m (5' 10.5\")   Wt 110.9 kg (244 lb 9.6 oz)   BMI 34.60 kg/m       Constitutional:  cooperative, alert and oriented, well developed, well nourished, in no acute distress        Skin:  warm and dry to the touch, no apparent skin lesions or masses noted          Head:  normocephalic, no masses or lesions        Eyes:  pupils equal and round, conjunctivae and lids unremarkable, sclera white, no xanthalasma, EOMS intact, no nystagmus        Lymph:      ENT:  no pallor or cyanosis, dentition good        Neck:  carotid pulses are full and equal bilaterally, JVP normal, no carotid bruit        Respiratory:  normal breath sounds, clear to auscultation, normal A-P diameter, normal " symmetry, normal respiratory excursion, no use of accessory muscles         Cardiac: regular rhythm, normal S1/S2, no S3 or S4, apical impulse not displaced, no murmurs, gallops or rubs                pulses full and equal, no bruits auscultated             2+ 2+           2+ 2+          GI:  abdomen soft, non-tender, BS normoactive, no mass, no HSM, no bruits        Extremities and Muscular Skeletal:  no deformities, clubbing, cyanosis, erythema observed;no edema              Neurological:  no gross motor deficits;affect appropriate        Psych:  Alert and Oriented x 3        CC  Aden Lopez MD  [id number] [place] [address]                Thank you for allowing me to participate in the care of your patient.      Sincerely,     Cr Cassidy MD     [facility]    cc:   Aden Lopez MD  [id number] [place] [address]

## 2019-04-29 NOTE — PROGRESS NOTES
Service Date: 04/29/2019      PRIMARY CARE PHYSICIAN:  Dr. Aden Lopez.      HISTORY OF PRESENT ILLNESS:  I had the pleasure of seeing your patient, Crispin Rojas, at [facility] for evaluation of a new right bundle branch block and evidence of thoracic aortic enlargement.  This patient was previously seen by my associate, Dr. Valentino Tadeo, 05/20/2010 after suffering a stroke.  He developed an abrupt onset of vertigo and loss of taste in his tongue in 02/2010.  An MRI indicated a stenosis in a small branch of the distal left vertebral artery and a tiny acute infarct just lateral to the left facial colliculus.  The patient has been treated medically.  An echocardiogram showed very mild dilatation of the aortic sinuses and otherwise unremarkable echocardiogram.  His cardiac risk factors have included hypertension, insulin-dependent diabetes mellitus, dyslipidemia, family history for vascular disease.  The patient has never had a myocardial infarction and is a lifelong nonsmoker.  He has generally engaged in regular exercise but has been more limited in the last 2 weeks by an ulcer on his right foot.  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  Last year the patient felt poorly and an EKG was performed on 06/23/2018.  This demonstrated normal sinus rhythm with a right bundle branch block that had not been present on 11/22/2017.  The patient denies angina pectoris or increased shortness of breath.  He believes he had a coronary angiogram performed approximately 4-5 years ago in [place], [place].  He will try to obtain that information for us.  He has not had a recent stress test.  An echocardiogram performed on 03/20/2019 indicated an aortic root of 4.2 cm and an ascending aorta 4.4 cm.  Normal left ventricular size and systolic  function with ejection fraction of 55%-60%.  There is borderline concentric left ventricular hypertrophy.  There is mild biatrial enlargement.  A contrast bubble study was negative.  A stress test in 2010 was normal.  The patient denies PND, orthopnea or peripheral edema.  He is going to seek medical attention for the ulceration on his right foot.  He believes this has occurred because of shoe inserts and blistering.  His insulin-dependent diabetes mellitus has been under reasonably good control.  However, more recently his hemoglobin A1c has increased to 8.2%.  He had difficulty with his equipment while in [place] caring for his father recently.  His only previous surgeries have included amputation of the big toes for infections.  He at one time weighed 300 pounds and has lost considerable weight.  This year he has gained 20 pounds and is working to decrease this.  He is  and has 2 children.  He has a history of obstructive sleep apnea and uses CPAP appropriately.      PHYSICAL EXAMINATION:  As listed below.      ASSESSMENT:   1.  Crispin Rojas is a pleasant 59-year-old male with numerous cardiovascular risk factors.  His right bundle branch block is new but not necessarily due to ischemic coronary artery disease.  The patient is not having any symptoms.  For now I do not believe that a stress test is necessary.  Should the patient have symptoms of increased shortness of breath or chest symptoms referable to angina pectoris a stress test would be in order.  The patient will call for these symptoms.  His right bundle branch block may be due to ongoing hypertension.   2.  Hypertension.  His goal is less than 130/80.  I am asking the patient to return to Dr. Lopez's office for further evaluation and treatment of this blood pressure.  Perhaps increasing his lisinopril would be sufficient.  We also discussed weight loss and a low-salt diet.  The patient states for the most part he does not add salt or cook  with salt.   3.  The patient's lipids are under fairly good control as of 01/10/2019.  His HDL is a bit low probably due to his lack of exercise.  This has been discussed.   4.  The aortic root and ascending aorta were enlarged even in .  We will retest with an echocardiogram in 1 year.  Control of his blood pressure is of paramount importance.      It is my pleasure to assist in the care of Cedric Law.  I intend to see him again in 1 year with an echocardiogram at that time.  All the patient's questions were answered to his satisfaction.      Tory Johnston MD        cc:   [place] [address]         TORY JOHNSTON MD, FACC             D: 2019   T: 2019   MT: ONESIMO      Name:     CEDRIC LAW   MRN:      [id number]        Account:      [id number]   :      1960           Service Date: 2019      Document: [id number]

## 2019-04-29 NOTE — PROGRESS NOTES
HPI and Plan:   See dictation:[id number]    Orders Placed This Encounter   Procedures     Follow-Up with Cardiologist     Echocardiogram Complete       No orders of the defined types were placed in this encounter.      Medications Discontinued During This Encounter   Medication Reason     CIALIS 5 MG tablet Medication Reconciliation Clean Up         Encounter Diagnoses   Name Primary?     Right bundle branch block Yes     Thoracic aortic aneurysm without rupture (H)      Essential hypertension      Type 2 diabetes mellitus with peripheral neuropathy (H)        CURRENT MEDICATIONS:  Current Outpatient Medications   Medication Sig Dispense Refill     amLODIPine (NORVASC) 5 MG tablet TAKE 1 TABLET BY MOUTH EVERY DAY 90 tablet 0     atorvastatin (LIPITOR) 40 MG tablet TAKE 1 TABLET BY MOUTH EVERY DAY 90 tablet 2     blood glucose (NO BRAND SPECIFIED) lancets standard Use to test blood sugar 3 times daily or as directed. 300 each 3     blood glucose monitoring (NO BRAND SPECIFIED) meter device kit Use to test blood sugar 3 times daily or as directed. 1 kit 0     blood glucose monitoring (NO BRAND SPECIFIED) test strip Use to test blood sugars 3 times daily or as directed 300 strip 3     Cholecalciferol (VITAMIN D3 PO) Take 1,000 Units by mouth daily        CIALIS 5 MG tablet TAKE 1 TABLET BY MOUTH EVERY DAY AS NEEDED 30 tablet 0     Co-Enzyme Q10 100 MG CAPS Take 100 mg by mouth daily        Continuous Blood Gluc Sensor (FREESTYLE LUCERO 14 DAY SENSOR) MISC 1 each every 14 days 2 each 11     cyclobenzaprine (FLEXERIL) 10 MG tablet Take 1 tablet (10 mg) by mouth 3 times daily as needed for muscle spasms 30 tablet 0     gabapentin (NEURONTIN) 100 MG capsule Take 300 mg by mouth At Bedtime       insulin degludec (TRESIBA FLEXTOUCH) 100 UNIT/ML pen Inject 55 Units Subcutaneous daily 60 mL 3     insulin lispro (HUMALOG KWIKPEN) 100 UNIT/ML pen INJECT 35 UNITS BEFORE BREAKFAST, LUNCH AND DINNER 105 mL 3     Insulin Pen Needle (PEN  NEEDLES) 31G X 5 MM MISC 1 Device 5 times daily , TWICE DAILY WITH LANTUS AND 3 TIMES A DAY PRN WITH NOVOLOG FLEXPEN 200 each 11     ketoconazole (NIZORAL) 2 % external shampoo APPLY TO THE AFFECTED AREA AND THEN WASH OFF AFTER 5 MINUTES. 120 mL 0     lisinopril (PRINIVIL/ZESTRIL) 20 MG tablet Take 1 tablet (20 mg) by mouth daily 90 tablet 0     metFORMIN (GLUCOPHAGE-XR) 500 MG 24 hr tablet TAKE 2 TABLETS BY MOUTH TWICE DAILY WITH MEALS 360 tablet 3     methylPREDNISolone (MEDROL DOSEPAK) 4 MG tablet therapy pack Follow Package Directions 21 tablet 0     Multiple Vitamins-Minerals (MULTIVITAMIN PO) Take 1 tablet by mouth daily        Omega-3 Fatty Acids (OMEGA-3 FISH OIL PO) Take 2 g by mouth daily        semaglutide (OZEMPIC) 1 MG/DOSE pen INJECT 1MG UNDER SKIN ONCE WEEKLY 6 pen 3     tadalafil (CIALIS) 5 MG tablet TAKE 1 TABLET BY MOUTH EVERY DAY AS NEEDED 30 tablet 1       ALLERGIES     Allergies   Allergen Reactions     Niacin Swelling     SIMCOR caused edema in extremities     Simvastatin Swelling     SIMCOR caused edema in extremities       PAST MEDICAL HISTORY:  Past Medical History:   Diagnosis Date     Cerebral infarction (H)      Chronic infection      H/O heartburn      History of heartburn      Hypertension      Numbness and tingling      Obesity      GILA (obstructive sleep apnea) 10/22/2018     Renal stone      Type II or unspecified type diabetes mellitus without mention of complication, not stated as uncontrolled        PAST SURGICAL HISTORY:  Past Surgical History:   Procedure Laterality Date     AMPUTATE FOOT Left 8/18/2016    Procedure: AMPUTATE FOOT;  Surgeon: Jack Younger DPEDDIE;  Location: [facility]     AMPUTATE TOE(S) Right 2/1/2016    Procedure: AMPUTATE TOE(S);  Surgeon: Rachelle Manriquez DPM, Pod;  Location: [facility] OR     ANGIOGRAM       COLONOSCOPY       COMBINED CYSTOSCOPY, RETROGRADES, URETEROSCOPY, INSERT STENT Left 8/21/2016    Procedure: COMBINED CYSTOSCOPY, RETROGRADES, URETEROSCOPY,  INSERT STENT;  Surgeon: Artemio Valenzuela MD;  Location: [facility] OR     COMBINED CYSTOSCOPY, RETROGRADES, URETEROSCOPY, LASER HOLMIUM LITHOTRIPSY URETER(S), INSERT STENT Left 10/3/2016    Procedure: COMBINED CYSTOSCOPY, RETROGRADES, URETEROSCOPY, LASER HOLMIUM LITHOTRIPSY URETER(S), INSERT STENT;  Surgeon: Artemio Valenzuela MD;  Location: [facility]     EXTRACORPOREAL SHOCK WAVE LITHOTRIPSY (ESWL) Left 9/8/2016    Procedure: EXTRACORPOREAL SHOCK WAVE LITHOTRIPSY (ESWL);  Surgeon: Artemio Valenzuela MD;  Location: [facility]     RECESSION GASTROCNEMIUS Right 2/1/2016    Procedure: RECESSION GASTROCNEMIUS;  Surgeon: Rachelle Manriquez, DPM, Pod;  Location: [facility]       FAMILY HISTORY:  Family History   Problem Relation Age of Onset     Arthritis Mother         SLE     Connective Tissue Disorder Mother         lupus     Diabetes Mother      Cerebrovascular Disease Mother      Cancer Mother      Diabetes Father      Diabetes Maternal Grandfather      Diabetes Sister        SOCIAL HISTORY:  Social History     Socioeconomic History     Marital status:      Spouse name: Sydney     Number of children: 2     Years of education: None     Highest education level: None   Occupational History     Occupation: marketing     Employer: [employer]     Comment: [place]   Social Needs     Financial resource strain: None     Food insecurity:     Worry: None     Inability: None     Transportation needs:     Medical: None     Non-medical: None   Tobacco Use     Smoking status: Never Smoker     Smokeless tobacco: Never Used   Substance and Sexual Activity     Alcohol use: Yes     Alcohol/week: 0.0 oz     Comment: rare---red wine 2x per week     Drug use: No     Sexual activity: Yes     Partners: Female   Lifestyle     Physical activity:     Days per week: None     Minutes per session: None     Stress: None   Relationships     Social connections:     Talks on phone: None     Gets together: None     Attends Latter day service: None      "Active member of club or organization: None     Attends meetings of clubs or organizations: None     Relationship status: None     Intimate partner violence:     Fear of current or ex partner: None     Emotionally abused: None     Physically abused: None     Forced sexual activity: None   Other Topics Concern     Parent/sibling w/ CABG, MI or angioplasty before 65F 55M? No   Social History Narrative     None       Review of Systems:  Skin:  Positive for rash     Eyes:  Positive for glasses    ENT:  Negative      Respiratory:  Positive for sleep apnea;CPAP     Cardiovascular:  Negative      Gastroenterology: Positive for vomiting occ vomiting in the am- has not happened in a \"long time\"  Genitourinary:  Negative      Musculoskeletal:  Positive for back pain    Neurologic:  Negative      Psychiatric:  Negative      Heme/Lymph/Imm:  Negative      Endocrine:  Positive for diabetes      Physical Exam:  Vitals: /72 (BP Location: Right arm, Patient Position: Sitting, Cuff Size: Adult Large)   Pulse 60   Ht 1.791 m (5' 10.5\")   Wt 110.9 kg (244 lb 9.6 oz)   BMI 34.60 kg/m      Constitutional:  cooperative, alert and oriented, well developed, well nourished, in no acute distress        Skin:  warm and dry to the touch, no apparent skin lesions or masses noted          Head:  normocephalic, no masses or lesions        Eyes:  pupils equal and round, conjunctivae and lids unremarkable, sclera white, no xanthalasma, EOMS intact, no nystagmus        Lymph:      ENT:  no pallor or cyanosis, dentition good        Neck:  carotid pulses are full and equal bilaterally, JVP normal, no carotid bruit        Respiratory:  normal breath sounds, clear to auscultation, normal A-P diameter, normal symmetry, normal respiratory excursion, no use of accessory muscles         Cardiac: regular rhythm, normal S1/S2, no S3 or S4, apical impulse not displaced, no murmurs, gallops or rubs                pulses full and equal, no bruits " auscultated             2+ 2+           2+ 2+          GI:  abdomen soft, non-tender, BS normoactive, no mass, no HSM, no bruits        Extremities and Muscular Skeletal:  no deformities, clubbing, cyanosis, erythema observed;no edema              Neurological:  no gross motor deficits;affect appropriate        Psych:  Alert and Oriented x 3        CC  [place]den Lopez [place]  [address] [place] [address]

## 2019-04-29 NOTE — LETTER
4/29/2019      Aden Lopez MD  [address] [facility] [address]      RE: Crispin Burnettkorin       Dear Colleague,    I had the pleasure of seeing Crispin Rojas in the [facility].    Service Date: 04/29/2019      PRIMARY CARE PHYSICIAN:  Dr. Aden Lopez.      HISTORY OF PRESENT ILLNESS:  I had the pleasure of seeing your patient, Crispin Rojas, at [facility] for evaluation of a new right bundle branch block and evidence of thoracic aortic enlargement.  This patient was previously seen by my associate, Dr. Valentino Tadeo, 05/20/2010 after suffering a stroke.  He developed an abrupt onset of vertigo and loss of taste in his tongue in 02/2010.  An MRI indicated a stenosis in a small branch of the distal left vertebral artery and a tiny acute infarct just lateral to the left facial colliculus.  The patient has been treated medically.  An echocardiogram showed very mild dilatation of the aortic sinuses and otherwise unremarkable echocardiogram.  His cardiac risk factors have included hypertension, insulin-dependent diabetes mellitus, dyslipidemia, family history for vascular disease.  The patient has never had a myocardial infarction and is a lifelong nonsmoker.  He has generally engaged in regular exercise but has been more limited in the last 2 weeks by an ulcer on his right foot.  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  Last year the patient felt poorly and an EKG was performed on 06/23/2018.  This demonstrated normal sinus rhythm with a right bundle branch block that had not been present on 11/22/2017.  The patient denies angina pectoris or increased shortness of breath.  He believes he had a coronary angiogram performed approximately 4-5 years ago in [place], [place].  He will try  to obtain that information for us.  He has not had a recent stress test.  An echocardiogram performed on 03/20/2019 indicated an aortic root of 4.2 cm and an ascending aorta 4.4 cm.  Normal left ventricular size and systolic function with ejection fraction of 55%-60%.  There is borderline concentric left ventricular hypertrophy.  There is mild biatrial enlargement.  A contrast bubble study was negative.  A stress test in 2010 was normal.  The patient denies PND, orthopnea or peripheral edema.  He is going to seek medical attention for the ulceration on his right foot.  He believes this has occurred because of shoe inserts and blistering.  His insulin-dependent diabetes mellitus has been under reasonably good control.  However, more recently his hemoglobin A1c has increased to 8.2%.  He had difficulty with his equipment while in [place] caring for his father recently.  His only previous surgeries have included amputation of the big toes for infections.  He at one time weighed 300 pounds and has lost considerable weight.  This year he has gained 20 pounds and is working to decrease this.  He is  and has 2 children.  He has a history of obstructive sleep apnea and uses CPAP appropriately.      PHYSICAL EXAMINATION:  As listed below.      ASSESSMENT:   1.  Crispin Rojas is a pleasant 59-year-old male with numerous cardiovascular risk factors.  His right bundle branch block is new but not necessarily due to ischemic coronary artery disease.  The patient is not having any symptoms.  For now I do not believe that a stress test is necessary.  Should the patient have symptoms of increased shortness of breath or chest symptoms referable to angina pectoris a stress test would be in order.  The patient will call for these symptoms.  His right bundle branch block may be due to ongoing hypertension.   2.  Hypertension.  His goal is less than 130/80.  I am asking the patient to return to Dr. Lopez's office for further  evaluation and treatment of this blood pressure.  Perhaps increasing his lisinopril would be sufficient.  We also discussed weight loss and a low-salt diet.  The patient states for the most part he does not add salt or cook with salt.   3.  The patient's lipids are under fairly good control as of 01/10/2019.  His HDL is a bit low probably due to his lack of exercise.  This has been discussed.   4.  The aortic root and ascending aorta were enlarged even in .  We will retest with an echocardiogram in 1 year.  Control of his blood pressure is of paramount importance.      It is my pleasure to assist in the care of Cedric Law.  I intend to see him again in 1 year with an echocardiogram at that time.  All the patient's questions were answered to his satisfaction.      Tory Johnston MD        cc:   [place] [address]         TORY JOHNSTON MD, [facility]             D: 2019   T: 2019   MT: ONESIMO      Name:     CEDRIC LAW   MRN:      [id number]        Account:      [id number]   :      1960           Service Date: 2019      Document: [id number]           Outpatient Encounter Medications as of 2019   Medication Sig Dispense Refill     amLODIPine (NORVASC) 5 MG tablet TAKE 1 TABLET BY MOUTH EVERY DAY 90 tablet 0     atorvastatin (LIPITOR) 40 MG tablet TAKE 1 TABLET BY MOUTH EVERY DAY 90 tablet 2     blood glucose (NO BRAND SPECIFIED) lancets standard Use to test blood sugar 3 times daily or as directed. 300 each 3     blood glucose monitoring (NO BRAND SPECIFIED) meter device kit Use to test blood sugar 3 times daily or as directed. 1 kit 0     blood glucose monitoring (NO BRAND SPECIFIED) test strip Use to test blood sugars 3 times daily or as directed 300 strip 3     Cholecalciferol (VITAMIN D3 PO) Take 1,000 Units by mouth daily        CIALIS 5 MG tablet TAKE 1 TABLET BY MOUTH EVERY DAY AS NEEDED  30 tablet 0     Co-Enzyme Q10 100 MG CAPS Take 100 mg by mouth daily        Continuous Blood Gluc Sensor (FREESTYLE LUCERO 14 DAY SENSOR) MISC 1 each every 14 days 2 each 11     cyclobenzaprine (FLEXERIL) 10 MG tablet Take 1 tablet (10 mg) by mouth 3 times daily as needed for muscle spasms 30 tablet 0     gabapentin (NEURONTIN) 100 MG capsule Take 300 mg by mouth At Bedtime       insulin degludec (TRESIBA FLEXTOUCH) 100 UNIT/ML pen Inject 55 Units Subcutaneous daily 60 mL 3     insulin lispro (HUMALOG KWIKPEN) 100 UNIT/ML pen INJECT 35 UNITS BEFORE BREAKFAST, LUNCH AND DINNER 105 mL 3     Insulin Pen Needle (PEN NEEDLES) 31G X 5 MM MISC 1 Device 5 times daily , TWICE DAILY WITH LANTUS AND 3 TIMES A DAY PRN WITH NOVOLOG FLEXPEN 200 each 11     ketoconazole (NIZORAL) 2 % external shampoo APPLY TO THE AFFECTED AREA AND THEN WASH OFF AFTER 5 MINUTES. 120 mL 0     lisinopril (PRINIVIL/ZESTRIL) 20 MG tablet Take 1 tablet (20 mg) by mouth daily 90 tablet 0     metFORMIN (GLUCOPHAGE-XR) 500 MG 24 hr tablet TAKE 2 TABLETS BY MOUTH TWICE DAILY WITH MEALS 360 tablet 3     methylPREDNISolone (MEDROL DOSEPAK) 4 MG tablet therapy pack Follow Package Directions 21 tablet 0     Multiple Vitamins-Minerals (MULTIVITAMIN PO) Take 1 tablet by mouth daily        Omega-3 Fatty Acids (OMEGA-3 FISH OIL PO) Take 2 g by mouth daily        semaglutide (OZEMPIC) 1 MG/DOSE pen INJECT 1MG UNDER SKIN ONCE WEEKLY 6 pen 3     tadalafil (CIALIS) 5 MG tablet TAKE 1 TABLET BY MOUTH EVERY DAY AS NEEDED 30 tablet 1     [DISCONTINUED] CIALIS 5 MG tablet TAKE 1 TABLET BY MOUTH EVERY DAY AS NEEDED 30 tablet 0     No facility-administered encounter medications on file as of 4/29/2019.        Again, thank you for allowing me to participate in the care of your patient.      Sincerely,    Cr Cassidy MD     [facility]

## 2019-05-03 NOTE — PATIENT INSTRUCTIONS
Once you use up your current supply of Bydureon, change to Ozempic 1 mg weekly.  Use the copay card of $25 per month for 5 months.  We can try to get a prior auth after that OR change to trulicity if your insurance company won't pay for continued Ozempic.    If Admelog is covered, can use this new insulin in place of Humalog    You can make a lab appointment to recheck the A1c on or after 6/21/18 or after.    Follow up with me in 3 months, on or after 9/22/18.    Jasmin Arteaga NP  Endocrinology     02:20

## 2019-05-21 ENCOUNTER — TELEPHONE (OUTPATIENT)
Dept: SLEEP MEDICINE | Facility: CLINIC | Age: 59
End: 2019-05-21

## 2019-05-21 ENCOUNTER — TELEPHONE (OUTPATIENT)
Dept: ENDOCRINOLOGY | Facility: CLINIC | Age: 59
End: 2019-05-21

## 2019-05-21 DIAGNOSIS — G47.33 OSA (OBSTRUCTIVE SLEEP APNEA): Primary | ICD-10-CM

## 2019-05-21 NOTE — TELEPHONE ENCOUNTER
Patient is needing filters for his machine. Is requesting you put an order in for his c-pap supplies. He was running late today and rescheduled for July.     Goldie Kelly

## 2019-05-21 NOTE — TELEPHONE ENCOUNTER
Patient wanting a new Rx for 14 day sensors.      Send Rx to:    [facility] [id number] [place] [address] [address] AT [facility] [address]

## 2019-05-22 ENCOUNTER — NURSE TRIAGE (OUTPATIENT)
Dept: NURSING | Facility: CLINIC | Age: 59
End: 2019-05-22

## 2019-05-22 NOTE — TELEPHONE ENCOUNTER
Called pharmacy who states patient has 12 refills left on the 14 day sensors.    Left a detailed message per consent to communicate informing patient that he has refills left at pharmacy, and that his pharmacy will start a refill.

## 2019-05-22 NOTE — TELEPHONE ENCOUNTER
"[facility] pharmacy called stating patient needs order for blood glucose free style lilly 14 day reader.     [facility] paged on call provider for [facility], Agnieszka Harvey MD via smart web at 6:40 pm to call RN back directly at [phone]    Dr Dotson called back and gave verbal order for blood glucose reader.    [facility] called [facility] pharmacy in [place] [address] and spoke with a pharmacist gave them MD's verbal order.    Gabbie Cast RN/[facility]    Reason for Disposition    [1] Request for URGENT new prescription or refill of \"essential\" medication (i.e., likelihood of harm to patient if not taken) AND [2] triager unable to fill per unit policy    Protocols used: MEDICATION QUESTION CALL-A-AH      "

## 2019-05-28 ENCOUNTER — PATIENT OUTREACH (OUTPATIENT)
Dept: NURSING | Facility: CLINIC | Age: 59
End: 2019-05-28

## 2019-05-28 NOTE — PROGRESS NOTES
Outreached patient for third and final time, but wasn't able to connect. Left message for call back.    Juan M Oshea, Health    5/28/2019    10:44 AM

## 2019-05-30 ENCOUNTER — DOCUMENTATION ONLY (OUTPATIENT)
Dept: SLEEP MEDICINE | Facility: CLINIC | Age: 59
End: 2019-05-30

## 2019-05-30 DIAGNOSIS — G47.33 OSA (OBSTRUCTIVE SLEEP APNEA): ICD-10-CM

## 2019-05-30 NOTE — PROGRESS NOTES
Updated pt account with new pap supply rx. Called pt to inform of the new rx and to ask if pt needs supplies. Lvm for pt to return to [facility] [phone] if he needs supplies.

## 2019-06-01 DIAGNOSIS — G45.8 OTHER SPECIFIED TRANSIENT CEREBRAL ISCHEMIAS: ICD-10-CM

## 2019-06-01 RX ORDER — AMLODIPINE BESYLATE 5 MG/1
5 TABLET ORAL DAILY
Qty: 90 TABLET | Refills: 0 | Status: SHIPPED | OUTPATIENT
Start: 2019-06-01 | End: 2019-09-26

## 2019-06-01 NOTE — TELEPHONE ENCOUNTER
"Prescription approved per [facility] Refill Protocol.  Javier Henrdickson RN, BSN      Last Written Prescription Date:  1/8/19  Last Fill Quantity: 90,  # refills: 0   Last office visit: 4/23/2019 with prescribing provider:  Donya   Future Office Visit:   Next 5 appointments (look out 90 days)    Jun 28, 2019 10:00 AM CDT  (Arrive by 9:45 AM)  [facility] SARAH [facility]undo C[facility]  [facility] [address] [facility] [address] [phone]         Requested Prescriptions   Pending Prescriptions Disp Refills     amLODIPine (NORVASC) 5 MG tablet 90 tablet 0     Sig: Take 1 tablet (5 mg) by mouth daily       Calcium Channel Blockers Protocol  Failed - 6/1/2019 10:07 AM        Failed - Blood pressure under 140/90 in past 12 months     BP Readings from Last 3 Encounters:   04/29/19 142/72   04/23/19 126/82   03/29/19 150/76                 Passed - Recent (12 mo) or future (30 days) visit within the authorizing provider's specialty     Patient had office visit in the last 12 months or has a visit in the next 30 days with authorizing provider or within the authorizing provider's specialty.  See \"Patient Info\" tab in inbasket, or \"Choose Columns\" in Meds & Orders section of the refill encounter.              Passed - Medication is active on med list        Passed - Patient is age 18 or older        Passed - Normal serum creatinine on file in past 12 months     Recent Labs   Lab Test 01/10/19  1105  01/25/16  0704   CR 0.89   < >  --    CREAT  --   --  0.8    < > = values in this interval not displayed.               "

## 2019-06-01 NOTE — TELEPHONE ENCOUNTER
Patient is out of this medication amLODIPine (NORVASC) 5 MG tablet and would like to get a refill. Patient states pharmacy tried to reach us 2 days ago.  Goldie Stinson

## 2019-06-03 DIAGNOSIS — L21.9 SEBORRHEIC DERMATITIS: ICD-10-CM

## 2019-06-03 NOTE — TELEPHONE ENCOUNTER
"Requested Prescriptions   Pending Prescriptions Disp Refills     ketoconazole (NIZORAL) 2 % external shampoo [Pharmacy Med Name: KETOCONAZOLE 2% SHAMPOO 120ML]  Last Written Prescription Date:  3/20/2019  Last Fill Quantity: 120 mL,  # refills: 0   Last office visit: 4/23/2019 with prescribing provider:  Donya     Future Office Visit:   Next 5 appointments (look out 90 days)    Jun 28, 2019 10:00 AM CDT  (Arrive by 9:45 AM)  Return Visit with SARAH Raymundo CNP  [facility] [address] [facility] [address]  [phone]          120 mL 0     Sig: APPLY TO THE AFFECTED AREA AND THEN WASH OFF AFTER 5 MINUTES.       Antifungal Agents Passed - 6/3/2019 12:39 PM        Passed - Recent (12 mo) or future (30 days) visit within the authorizing provider's specialty     Patient had office visit in the last 12 months or has a visit in the next 30 days with authorizing provider or within the authorizing provider's specialty.  See \"Patient Info\" tab in inbasket, or \"Choose Columns\" in Meds & Orders section of the refill encounter.              Passed - Not Fluconazole or Terconazole      If oral Fluconazole or Terconazole, may refill if indicated in progress notes.           Passed - Medication is active on med list        Patient needs today  "

## 2019-06-04 RX ORDER — KETOCONAZOLE 20 MG/ML
SHAMPOO TOPICAL
Qty: 120 ML | Refills: 0 | Status: ON HOLD | OUTPATIENT
Start: 2019-06-04 | End: 2019-07-07

## 2019-06-10 ENCOUNTER — DOCUMENTATION ONLY (OUTPATIENT)
Dept: SLEEP MEDICINE | Facility: CLINIC | Age: 59
End: 2019-06-10

## 2019-06-10 DIAGNOSIS — G47.33 OSA (OBSTRUCTIVE SLEEP APNEA): ICD-10-CM

## 2019-06-10 NOTE — PROGRESS NOTES
6 month [facility] Recheck Visit     Diagnostic AHI:   34.1  HST    Message left for patient to return call     Assessment: Pt not meeting objective benchmarks for compliance     Action plan: waiting for patient to return call.   pt to follow up per provider request       Device type: Auto-CPAP  PAP settings: CPAP min 5.0 cm  H20     CPAP max 12.0 cm  H20

## 2019-06-24 ENCOUNTER — TELEPHONE (OUTPATIENT)
Dept: PODIATRY | Facility: CLINIC | Age: 59
End: 2019-06-24

## 2019-06-24 ENCOUNTER — HOSPITAL ENCOUNTER (INPATIENT)
Facility: CLINIC | Age: 59
LOS: 3 days | Discharge: HOME | End: 2019-06-27
Attending: PHYSICIAN ASSISTANT | Admitting: INTERNAL MEDICINE
Payer: COMMERCIAL

## 2019-06-24 DIAGNOSIS — L97.509 DIABETIC FOOT ULCER (H): ICD-10-CM

## 2019-06-24 DIAGNOSIS — L03.116 CELLULITIS OF LEFT LOWER EXTREMITY: ICD-10-CM

## 2019-06-24 DIAGNOSIS — E11.621 DIABETIC FOOT ULCER (H): ICD-10-CM

## 2019-06-24 DIAGNOSIS — L03.031 CELLULITIS OF TOE OF RIGHT FOOT: Primary | ICD-10-CM

## 2019-06-24 PROBLEM — L03.90 CELLULITIS: Status: ACTIVE | Noted: 2019-06-24

## 2019-06-24 LAB
ANION GAP SERPL CALCULATED.3IONS-SCNC: 5 MMOL/L (ref 3–14)
BASOPHILS # BLD AUTO: 0.1 10E9/L (ref 0–0.2)
BASOPHILS NFR BLD AUTO: 0.7 %
BUN SERPL-MCNC: 16 MG/DL (ref 7–30)
CALCIUM SERPL-MCNC: 8.8 MG/DL (ref 8.5–10.1)
CHLORIDE SERPL-SCNC: 102 MMOL/L (ref 94–109)
CO2 SERPL-SCNC: 31 MMOL/L (ref 20–32)
CREAT SERPL-MCNC: 0.98 MG/DL (ref 0.66–1.25)
DIFFERENTIAL METHOD BLD: ABNORMAL
EOSINOPHIL # BLD AUTO: 0.2 10E9/L (ref 0–0.7)
EOSINOPHIL NFR BLD AUTO: 2 %
ERYTHROCYTE [DISTWIDTH] IN BLOOD BY AUTOMATED COUNT: 13.4 % (ref 10–15)
GFR SERPL CREATININE-BSD FRML MDRD: 85 ML/MIN/{1.73_M2}
GLUCOSE BLDC GLUCOMTR-MCNC: 129 MG/DL (ref 70–99)
GLUCOSE BLDC GLUCOMTR-MCNC: 174 MG/DL (ref 70–99)
GLUCOSE SERPL-MCNC: 168 MG/DL (ref 70–99)
HBA1C MFR BLD: 7.7 % (ref 0–5.6)
HCT VFR BLD AUTO: 40.5 % (ref 40–53)
HGB BLD-MCNC: 13 G/DL (ref 13.3–17.7)
IMM GRANULOCYTES # BLD: 0 10E9/L (ref 0–0.4)
IMM GRANULOCYTES NFR BLD: 0.1 %
LACTATE BLD-SCNC: 1.5 MMOL/L (ref 0.7–2)
LYMPHOCYTES # BLD AUTO: 1.7 10E9/L (ref 0.8–5.3)
LYMPHOCYTES NFR BLD AUTO: 22.7 %
MCH RBC QN AUTO: 29 PG (ref 26.5–33)
MCHC RBC AUTO-ENTMCNC: 32.1 G/DL (ref 31.5–36.5)
MCV RBC AUTO: 90 FL (ref 78–100)
MONOCYTES # BLD AUTO: 0.7 10E9/L (ref 0–1.3)
MONOCYTES NFR BLD AUTO: 9.5 %
NEUTROPHILS # BLD AUTO: 4.9 10E9/L (ref 1.6–8.3)
NEUTROPHILS NFR BLD AUTO: 65 %
NRBC # BLD AUTO: 0 10*3/UL
NRBC BLD AUTO-RTO: 0 /100
PLATELET # BLD AUTO: 163 10E9/L (ref 150–450)
POTASSIUM SERPL-SCNC: 4 MMOL/L (ref 3.4–5.3)
RBC # BLD AUTO: 4.49 10E12/L (ref 4.4–5.9)
SODIUM SERPL-SCNC: 138 MMOL/L (ref 133–144)
WBC # BLD AUTO: 7.6 10E9/L (ref 4–11)

## 2019-06-24 PROCEDURE — 00000146 ZZHCL STATISTIC GLUCOSE BY METER IP

## 2019-06-24 PROCEDURE — 83605 ASSAY OF LACTIC ACID: CPT | Performed by: PHYSICIAN ASSISTANT

## 2019-06-24 PROCEDURE — 83036 HEMOGLOBIN GLYCOSYLATED A1C: CPT | Performed by: INTERNAL MEDICINE

## 2019-06-24 PROCEDURE — 25000128 H RX IP 250 OP 636: Performed by: PHYSICIAN ASSISTANT

## 2019-06-24 PROCEDURE — 36415 COLL VENOUS BLD VENIPUNCTURE: CPT | Performed by: INTERNAL MEDICINE

## 2019-06-24 PROCEDURE — 25000128 H RX IP 250 OP 636: Performed by: INTERNAL MEDICINE

## 2019-06-24 PROCEDURE — 25000131 ZZH RX MED GY IP 250 OP 636 PS 637: Performed by: INTERNAL MEDICINE

## 2019-06-24 PROCEDURE — 25800030 ZZH RX IP 258 OP 636: Performed by: INTERNAL MEDICINE

## 2019-06-24 PROCEDURE — 25000132 ZZH RX MED GY IP 250 OP 250 PS 637

## 2019-06-24 PROCEDURE — 80048 BASIC METABOLIC PNL TOTAL CA: CPT | Performed by: PHYSICIAN ASSISTANT

## 2019-06-24 PROCEDURE — 12000000 ZZH R&B MED SURG/OB

## 2019-06-24 PROCEDURE — 96365 THER/PROPH/DIAG IV INF INIT: CPT

## 2019-06-24 PROCEDURE — 96361 HYDRATE IV INFUSION ADD-ON: CPT

## 2019-06-24 PROCEDURE — 85025 COMPLETE CBC W/AUTO DIFF WBC: CPT | Performed by: PHYSICIAN ASSISTANT

## 2019-06-24 PROCEDURE — 99223 1ST HOSP IP/OBS HIGH 75: CPT | Mod: AI | Performed by: INTERNAL MEDICINE

## 2019-06-24 PROCEDURE — 87040 BLOOD CULTURE FOR BACTERIA: CPT | Performed by: INTERNAL MEDICINE

## 2019-06-24 PROCEDURE — 25000132 ZZH RX MED GY IP 250 OP 250 PS 637: Performed by: INTERNAL MEDICINE

## 2019-06-24 PROCEDURE — 99285 EMERGENCY DEPT VISIT HI MDM: CPT | Mod: 25

## 2019-06-24 RX ORDER — GABAPENTIN 100 MG/1
400 CAPSULE ORAL 3 TIMES DAILY
COMMUNITY

## 2019-06-24 RX ORDER — PROCHLORPERAZINE 25 MG
25 SUPPOSITORY, RECTAL RECTAL EVERY 12 HOURS PRN
Status: DISCONTINUED | OUTPATIENT
Start: 2019-06-24 | End: 2019-06-27 | Stop reason: HOSPADM

## 2019-06-24 RX ORDER — ACETAMINOPHEN 325 MG/1
TABLET ORAL
Status: COMPLETED
Start: 2019-06-24 | End: 2019-06-24

## 2019-06-24 RX ORDER — OXYCODONE HYDROCHLORIDE 5 MG/1
5-10 TABLET ORAL
Status: DISCONTINUED | OUTPATIENT
Start: 2019-06-24 | End: 2019-06-27 | Stop reason: HOSPADM

## 2019-06-24 RX ORDER — ONDANSETRON 2 MG/ML
4 INJECTION INTRAMUSCULAR; INTRAVENOUS EVERY 6 HOURS PRN
Status: DISCONTINUED | OUTPATIENT
Start: 2019-06-24 | End: 2019-06-27 | Stop reason: HOSPADM

## 2019-06-24 RX ORDER — AMOXICILLIN 250 MG
1 CAPSULE ORAL 2 TIMES DAILY PRN
Status: DISCONTINUED | OUTPATIENT
Start: 2019-06-24 | End: 2019-06-27 | Stop reason: HOSPADM

## 2019-06-24 RX ORDER — AMOXICILLIN 250 MG
2 CAPSULE ORAL 2 TIMES DAILY PRN
Status: DISCONTINUED | OUTPATIENT
Start: 2019-06-24 | End: 2019-06-27 | Stop reason: HOSPADM

## 2019-06-24 RX ORDER — GABAPENTIN 300 MG/1
300 CAPSULE ORAL 3 TIMES DAILY
Status: DISCONTINUED | OUTPATIENT
Start: 2019-06-24 | End: 2019-06-27 | Stop reason: HOSPADM

## 2019-06-24 RX ORDER — DEXTROSE MONOHYDRATE 25 G/50ML
25-50 INJECTION, SOLUTION INTRAVENOUS
Status: DISCONTINUED | OUTPATIENT
Start: 2019-06-24 | End: 2019-06-27 | Stop reason: HOSPADM

## 2019-06-24 RX ORDER — LIDOCAINE 40 MG/G
CREAM TOPICAL
Status: DISCONTINUED | OUTPATIENT
Start: 2019-06-24 | End: 2019-06-27 | Stop reason: HOSPADM

## 2019-06-24 RX ORDER — BISACODYL 10 MG
10 SUPPOSITORY, RECTAL RECTAL DAILY PRN
Status: DISCONTINUED | OUTPATIENT
Start: 2019-06-24 | End: 2019-06-27 | Stop reason: HOSPADM

## 2019-06-24 RX ORDER — PROCHLORPERAZINE MALEATE 5 MG
10 TABLET ORAL EVERY 6 HOURS PRN
Status: DISCONTINUED | OUTPATIENT
Start: 2019-06-24 | End: 2019-06-27 | Stop reason: HOSPADM

## 2019-06-24 RX ORDER — HYDROMORPHONE HYDROCHLORIDE 1 MG/ML
0.2 INJECTION, SOLUTION INTRAMUSCULAR; INTRAVENOUS; SUBCUTANEOUS
Status: DISCONTINUED | OUTPATIENT
Start: 2019-06-24 | End: 2019-06-27 | Stop reason: HOSPADM

## 2019-06-24 RX ORDER — ATORVASTATIN CALCIUM 40 MG/1
40 TABLET, FILM COATED ORAL AT BEDTIME
Status: DISCONTINUED | OUTPATIENT
Start: 2019-06-24 | End: 2019-06-27 | Stop reason: HOSPADM

## 2019-06-24 RX ORDER — SODIUM CHLORIDE 9 MG/ML
INJECTION, SOLUTION INTRAVENOUS CONTINUOUS
Status: DISCONTINUED | OUTPATIENT
Start: 2019-06-24 | End: 2019-06-26

## 2019-06-24 RX ORDER — NALOXONE HYDROCHLORIDE 0.4 MG/ML
.1-.4 INJECTION, SOLUTION INTRAMUSCULAR; INTRAVENOUS; SUBCUTANEOUS
Status: DISCONTINUED | OUTPATIENT
Start: 2019-06-24 | End: 2019-06-27 | Stop reason: HOSPADM

## 2019-06-24 RX ORDER — ACETAMINOPHEN 325 MG/1
650 TABLET ORAL EVERY 4 HOURS PRN
Status: DISCONTINUED | OUTPATIENT
Start: 2019-06-24 | End: 2019-06-27 | Stop reason: HOSPADM

## 2019-06-24 RX ORDER — ONDANSETRON 4 MG/1
4 TABLET, ORALLY DISINTEGRATING ORAL EVERY 6 HOURS PRN
Status: DISCONTINUED | OUTPATIENT
Start: 2019-06-24 | End: 2019-06-27 | Stop reason: HOSPADM

## 2019-06-24 RX ORDER — AMLODIPINE BESYLATE 5 MG/1
5 TABLET ORAL AT BEDTIME
Status: DISCONTINUED | OUTPATIENT
Start: 2019-06-24 | End: 2019-06-27 | Stop reason: HOSPADM

## 2019-06-24 RX ORDER — ACETAMINOPHEN 650 MG/1
650 SUPPOSITORY RECTAL EVERY 4 HOURS PRN
Status: DISCONTINUED | OUTPATIENT
Start: 2019-06-24 | End: 2019-06-27 | Stop reason: HOSPADM

## 2019-06-24 RX ORDER — POLYETHYLENE GLYCOL 3350 17 G/17G
17 POWDER, FOR SOLUTION ORAL DAILY PRN
Status: DISCONTINUED | OUTPATIENT
Start: 2019-06-24 | End: 2019-06-27 | Stop reason: HOSPADM

## 2019-06-24 RX ORDER — NICOTINE POLACRILEX 4 MG
15-30 LOZENGE BUCCAL
Status: DISCONTINUED | OUTPATIENT
Start: 2019-06-24 | End: 2019-06-27 | Stop reason: HOSPADM

## 2019-06-24 RX ORDER — LISINOPRIL 20 MG/1
20 TABLET ORAL AT BEDTIME
Status: DISCONTINUED | OUTPATIENT
Start: 2019-06-24 | End: 2019-06-27 | Stop reason: HOSPADM

## 2019-06-24 RX ADMIN — TAZOBACTAM SODIUM AND PIPERACILLIN SODIUM 4.5 G: 500; 4 INJECTION, SOLUTION INTRAVENOUS at 16:21

## 2019-06-24 RX ADMIN — AMLODIPINE BESYLATE 5 MG: 5 TABLET ORAL at 22:15

## 2019-06-24 RX ADMIN — INSULIN ASPART 18 UNITS: 100 INJECTION, SOLUTION INTRAVENOUS; SUBCUTANEOUS at 20:16

## 2019-06-24 RX ADMIN — ACETAMINOPHEN 650 MG: 325 TABLET, FILM COATED ORAL at 18:15

## 2019-06-24 RX ADMIN — LISINOPRIL 20 MG: 20 TABLET ORAL at 22:15

## 2019-06-24 RX ADMIN — GABAPENTIN 300 MG: 300 CAPSULE ORAL at 20:16

## 2019-06-24 RX ADMIN — INSULIN DEGLUDEC INJECTION 50 UNITS: 100 INJECTION, SOLUTION SUBCUTANEOUS at 22:56

## 2019-06-24 RX ADMIN — SODIUM CHLORIDE 1000 ML: 9 INJECTION, SOLUTION INTRAVENOUS at 14:59

## 2019-06-24 RX ADMIN — SODIUM CHLORIDE: 9 INJECTION, SOLUTION INTRAVENOUS at 19:31

## 2019-06-24 RX ADMIN — ACETAMINOPHEN 650 MG: 325 TABLET, FILM COATED ORAL at 19:30

## 2019-06-24 RX ADMIN — TAZOBACTAM SODIUM AND PIPERACILLIN SODIUM 3.38 G: 375; 3 INJECTION, SOLUTION INTRAVENOUS at 22:17

## 2019-06-24 RX ADMIN — ATORVASTATIN CALCIUM 40 MG: 40 TABLET, FILM COATED ORAL at 22:15

## 2019-06-24 ASSESSMENT — ENCOUNTER SYMPTOMS
SHORTNESS OF BREATH: 0
DIARRHEA: 0
COUGH: 0
COLOR CHANGE: 1
WOUND: 1
APPETITE CHANGE: 1
FEVER: 1
ABDOMINAL PAIN: 0
VOMITING: 0
NAUSEA: 0

## 2019-06-24 ASSESSMENT — ACTIVITIES OF DAILY LIVING (ADL): ADLS_ACUITY_SCORE: 13

## 2019-06-24 NOTE — ED NOTES
Pt resting Comfortably on bed.  Alert & Oriented  Pt C/O sores on both feet.  Left foot: amputation of big toe noted. Lesion noted with assoc yellowish discoloration. No bleeding  Right foot: partial amputation of big toe noted. Lesion noted with assoc yellowish discoloration. No bleeding.  No other complaints.

## 2019-06-24 NOTE — TELEPHONE ENCOUNTER
Pt called back and was upset that no one called him back. The number he was given would not help him at all and did not read the message to him. Pt is going to the ER in [place].

## 2019-06-24 NOTE — TELEPHONE ENCOUNTER
Reason for call:  Other   Patient called regarding (reason for call): call back  Additional comments: Patient is requesting a call back regarding foot pain that could be possible be infected     Phone number to reach patient:  Home number on file [phone] (home)    Best Time:  Anytime     Can we leave a detailed message on this number?  YES

## 2019-06-24 NOTE — PHARMACY-ADMISSION MEDICATION HISTORY
Admission medication history interview status for this patient is complete. See [facility] admission navigator for allergy information, prior to admission medications and immunization status.     Medication history interview source(s):Patient  Medication history resources (including written lists, pill bottles, clinic record):None  Primary pharmacy:[facility]    Changes made to PTA medication list:  Added: -  Deleted: flexeril, methylprednisolone  Changed: lisipro to 18,35,18 units, gabapentin to 300 mg TID, fish oil to 1 gm BID    Actions taken by pharmacist (provider contacted, etc):None     Additional medication history information:None    Medication reconciliation/reorder completed by provider prior to medication history? No    Do you take OTC medications (eg tylenol, ibuprofen, fish oil, eye/ear drops, etc)? yes(Y/N)    For patients on insulin therapy: yes (Y/N)  Lantus/levemir/NPH/Mix 70/30 dose:   (Y/N) (see Med list for doses) yes  Sliding scale Novolog Y/N no  If Yes, do you have a baseline novolog pre-meal dose:  units with meals yes  Patients eat three meals a day:   Y/N    How many episodes of hypoglycemia do you have per week: _______  How many missed doses do you have per week: ______  How many times do you check your blood glucose per day: _______  Do you have a Continuous glucose monitor (CGM)   Y/N (remind pt that not approved for hospital use)   Any Barriers to therapy - Be specific :  cost of medications, comfortable with giving injections (if applicable), comfortable and confident with current diabetes regimen: Y/N ______________ no      Prior to Admission medications    Medication Sig Last Dose Taking? Auth Provider   amLODIPine (NORVASC) 5 MG tablet Take 1 tablet (5 mg) by mouth daily 6/23/2019 at hs Yes Aden Lopez MD   atorvastatin (LIPITOR) 40 MG tablet TAKE 1 TABLET BY MOUTH EVERY DAY 6/23/2019 at hs Yes Aden Lopez MD   Cholecalciferol (VITAMIN D3 PO) Take  1,000 Units by mouth daily  6/23/2019 at hs Yes Reported, Patient   Co-Enzyme Q10 100 MG CAPS Take 100 mg by mouth daily  6/23/2019 at hs Yes Aden Lopez MD   gabapentin (NEURONTIN) 100 MG capsule Take 300 mg by mouth 3 times daily 6/24/2019 at x1 Yes Unknown, Entered By History   insulin degludec (TRESIBA FLEXTOUCH) 100 UNIT/ML pen Inject 55 Units Subcutaneous daily 6/23/2019 at pm Yes Jasmin Arteaga APRN CNP   insulin lispro (HUMALOG PEN) 100 UNIT/ML pen Inject 35 Units Subcutaneous daily (with lunch) 6/24/2019 at Unknown time Yes Unknown, Entered By History   insulin lispro (HUMALOG PEN) 100 UNIT/ML pen Inject 18 Units Subcutaneous 2 times daily (before meals) Breakfast and Dinner 6/24/2019 at am Yes Unknown, Entered By History   ketoconazole (NIZORAL) 2 % external shampoo APPLY TO THE AFFECTED AREA AND THEN WASH OFF AFTER 5 MINUTES.  Patient taking differently: APPLY DAILY TO THE AFFECTED AREA AND THEN WASH OFF AFTER 5 MINUTES. 6/24/2019 at Unknown time Yes Aden Lopez MD   lisinopril (PRINIVIL/ZESTRIL) 20 MG tablet Take 1 tablet (20 mg) by mouth daily 6/23/2019 at hs Yes Aden Lopez MD   metFORMIN (GLUCOPHAGE-XR) 500 MG 24 hr tablet TAKE 2 TABLETS BY MOUTH TWICE DAILY WITH MEALS 6/24/2019 at x1 Yes Jasmin Arteaga APRN CNP   Multiple Vitamins-Minerals (MULTIVITAMIN PO) Take 1 tablet by mouth daily  6/23/2019 at hs Yes Reported, Patient   Omega-3 Fatty Acids (OMEGA-3 FISH OIL PO) Take 1 g by mouth 2 times daily (with meals)  6/24/2019 at x1 Yes Reported, Patient   semaglutide (OZEMPIC) 1 MG/DOSE pen INJECT 1MG UNDER SKIN ONCE WEEKLY 6/20/2019 Yes Jasmin Arteaga APRN CNP   blood glucose (NO BRAND SPECIFIED) lancets standard Use to test blood sugar 3 times daily or as directed.   Jasmin Arteaga APRN CNP   blood glucose monitoring (NO BRAND SPECIFIED) meter device kit Use to test blood sugar 3 times daily or as directed.   Aden Lopez MD   blood  glucose monitoring (NO BRAND SPECIFIED) test strip Use to test blood sugars 3 times daily or as directed   Jasmin Arteaga APRN CNP   CIALIS 5 MG tablet TAKE 1 TABLET BY MOUTH EVERY DAY AS NEEDED   Aden Lopez MD   Continuous Blood Gluc Sensor (FREESTYLE LUCERO 14 DAY SENSOR) MISC 1 each every 14 days   Jasmin Arteaga APRN CNP   Insulin Pen Needle (PEN NEEDLES) 31G X 5 MM MISC 1 Device 5 times daily , TWICE DAILY WITH LANTUS AND 3 TIMES A DAY PRN WITH NOVOLOG FLEXPEN   Jasmin Arteaga APRN CNP

## 2019-06-24 NOTE — TELEPHONE ENCOUNTER
Patient saw Salbador last about 6 months ago.  I have not seen for 3 years.  Will route to Salbador.    PRAFUL FelderM

## 2019-06-24 NOTE — TELEPHONE ENCOUNTER
I recommend that he see one of our podiatrists at a next available time slot, as soon as possible.  If he is truly concerned about a foot infection, he should go to the ED.    Valentino Molina DPM, FACFAS, MS    [place] [facility]/Foot & [facility]le [facility]

## 2019-06-24 NOTE — H&P
[facility]    History and Physical - Hospitalist Service       Date of Admission:  6/24/2019    Assessment & Plan   Crispin Rojas is a 59 year old male admitted on 6/24/2019. He has a past medical history significant for hypertension, hyperlipidemia, diabetes mellitus, GERD, previous stroke, and sleep apnea.  He presented to emergency room with fever and left leg pain.  Found to have left leg cellulitis.    1.  Left leg cellulitis.  Likely related to left foot diabetic ulcer.  Continue IV Zosyn started in the emergency room.  Podiatry consult.      2.  Bilateral diabetic foot ulcers.  Wound nurse consult.  Podiatry consult.  Pain medications as needed.  IV Zosyn as noted above.    3.  Diabetes mellitus.  Restart long-acting insulin.  Restart scheduled and as needed short acting insulin.  Hold metformin initially.  Hold semaglutide.    4.  Hypertension.  Restart amlodipine and lisinopril.    5.  Hyperlipidemia.  Restart atorvastatin.     Diet: Diabetic diet.  DVT Prophylaxis: Pneumatic Compression Devices  Mccarthy Catheter: not present  Code Status: Full code.    Disposition Plan   Expected discharge: 2 - 3 days, recommended to prior living arrangement   Entered: Gordon Lerner DO 06/24/2019, 5:04 PM         Gordon Lerner DO  [facility]    ______________________________________________________________________    Chief Complaint   Fever and left leg pain.    History is obtained from the patient    History of Present Illness   Crispin Rojas is a 59 year old male who has a past medical history significant for hypertension, hyperlipidemia, diabetes mellitus, GERD, previous stroke, and sleep apnea.  He developed fever and left leg pain yesterday.  Symptoms got worse today.  He is also having some redness on his left shin today.  He did call his podiatrist office.  He was directed to emergency room for further evaluation.  He does note that pain medication given in the emergency  room seem to help pain quite a bit.  Pain medications at home seem to have only minimal effect.  He has not noted chills.  No chest pain, shortness of breath, nausea, vomiting, dysuria, or diarrhea.  He has noted ulcers on the bottoms of both feet that developed a few days ago.  He does have history of previous diabetic foot ulcer that did result in amputation of 1 of his right toes.  No other complaints at this time.    Review of Systems    The 10 point Review of Systems is negative other than noted in the HPI     Past Medical History    I have reviewed this patient's medical history and updated it with pertinent information if needed.   Past Medical History:   Diagnosis Date     Cerebral infarction (H)      Chronic infection      H/O heartburn      History of heartburn      Hypertension      Numbness and tingling      Obesity      GIAL (obstructive sleep apnea) 10/22/2018     Renal stone      Type II or unspecified type diabetes mellitus without mention of complication, not stated as uncontrolled        Past Surgical History   I have reviewed this patient's surgical history and updated it with pertinent information if needed.  Past Surgical History:   Procedure Laterality Date     AMPUTATE FOOT Left 8/18/2016    Procedure: AMPUTATE FOOT;  Surgeon: Jack Younger DPM;  Location: [facility] OR     AMPUTATE TOE(S) Right 2/1/2016    Procedure: AMPUTATE TOE(S);  Surgeon: Rachelle Manriquez DPM, Pod;  Location: [facility] OR     ANGIOGRAM       COLONOSCOPY       COMBINED CYSTOSCOPY, RETROGRADES, URETEROSCOPY, INSERT STENT Left 8/21/2016    Procedure: COMBINED CYSTOSCOPY, RETROGRADES, URETEROSCOPY, INSERT STENT;  Surgeon: Atremio Valenzuela MD;  Location: [facility] OR     COMBINED CYSTOSCOPY, RETROGRADES, URETEROSCOPY, LASER HOLMIUM LITHOTRIPSY URETER(S), INSERT STENT Left 10/3/2016    Procedure: COMBINED CYSTOSCOPY, RETROGRADES, URETEROSCOPY, LASER HOLMIUM LITHOTRIPSY URETER(S), INSERT STENT;  Surgeon: Artemio Valenzuela MD;   Location: [facility] OR     EXTRACORPOREAL SHOCK WAVE LITHOTRIPSY (ESWL) Left 2016    Procedure: EXTRACORPOREAL SHOCK WAVE LITHOTRIPSY (ESWL);  Surgeon: Artemio Valenzuela MD;  Location: [facility] OR     RECESSION GASTROCNEMIUS Right 2016    Procedure: RECESSION GASTROCNEMIUS;  Surgeon: Rachelle Manriquez, DPM, Pod;  Location: [facility] OR       Social History   I have reviewed this patient's social history and updated it with pertinent information if needed.  Social History     Tobacco Use     Smoking status: Never Smoker     Smokeless tobacco: Never Used   Substance Use Topics     Alcohol use: Yes     Alcohol/week: 0.0 oz     Comment: rare---red wine 2x per week     Drug use: No       Family History   I have reviewed this patient's family history and updated it with pertinent information if needed.   Family History   Problem Relation Age of Onset     Arthritis Mother         SLE     Connective Tissue Disorder Mother         lupus     Diabetes Mother      Cerebrovascular Disease Mother      Cancer Mother      Diabetes Father      Diabetes Maternal Grandfather      Diabetes Sister        Prior to Admission Medications   Prior to Admission Medications   Prescriptions Last Dose Informant Patient Reported? Taking?   CIALIS 5 MG tablet   No No   Sig: TAKE 1 TABLET BY MOUTH EVERY DAY AS NEEDED   Cholecalciferol (VITAMIN D3 PO) 2019 at hs  Yes Yes   Sig: Take 1,000 Units by mouth daily    Co-Enzyme Q10 100 MG CAPS 2019 at hs  Yes Yes   Sig: Take 100 mg by mouth daily    Continuous Blood Gluc Sensor (FREESTYLE LUCERO 14 DAY SENSOR) MISC   No No   Si each every 14 days   Insulin Pen Needle (PEN NEEDLES) 31G X 5 MM MISC   No No   Si Device 5 times daily , TWICE DAILY WITH LANTUS AND 3 TIMES A DAY PRN WITH NOVOLOG FLEXPEN   Multiple Vitamins-Minerals (MULTIVITAMIN PO) 2019 at hs  Yes Yes   Sig: Take 1 tablet by mouth daily    Omega-3 Fatty Acids (OMEGA-3 FISH OIL PO) 2019 at x1  Yes Yes   Sig: Take 1 g by mouth  2 times daily (with meals)    amLODIPine (NORVASC) 5 MG tablet 6/23/2019 at hs  No Yes   Sig: Take 1 tablet (5 mg) by mouth daily   atorvastatin (LIPITOR) 40 MG tablet 6/23/2019 at hs  No Yes   Sig: TAKE 1 TABLET BY MOUTH EVERY DAY   blood glucose (NO BRAND SPECIFIED) lancets standard   No No   Sig: Use to test blood sugar 3 times daily or as directed.   blood glucose monitoring (NO BRAND SPECIFIED) meter device kit   No No   Sig: Use to test blood sugar 3 times daily or as directed.   blood glucose monitoring (NO BRAND SPECIFIED) test strip   No No   Sig: Use to test blood sugars 3 times daily or as directed   gabapentin (NEURONTIN) 100 MG capsule 6/24/2019 at x1  Yes Yes   Sig: Take 300 mg by mouth 3 times daily   insulin degludec (TRESIBA FLEXTOUCH) 100 UNIT/ML pen 6/23/2019 at pm  No Yes   Sig: Inject 55 Units Subcutaneous daily   insulin lispro (HUMALOG PEN) 100 UNIT/ML pen 6/24/2019 at Unknown time  Yes Yes   Sig: Inject 35 Units Subcutaneous daily (with lunch)   insulin lispro (HUMALOG PEN) 100 UNIT/ML pen 6/24/2019 at am  Yes Yes   Sig: Inject 18 Units Subcutaneous 2 times daily (before meals) Breakfast and Dinner   ketoconazole (NIZORAL) 2 % external shampoo 6/24/2019 at Unknown time  No Yes   Sig: APPLY TO THE AFFECTED AREA AND THEN WASH OFF AFTER 5 MINUTES.   Patient taking differently: APPLY DAILY TO THE AFFECTED AREA AND THEN WASH OFF AFTER 5 MINUTES.   lisinopril (PRINIVIL/ZESTRIL) 20 MG tablet 6/23/2019 at hs  No Yes   Sig: Take 1 tablet (20 mg) by mouth daily   metFORMIN (GLUCOPHAGE-XR) 500 MG 24 hr tablet 6/24/2019 at x1  No Yes   Sig: TAKE 2 TABLETS BY MOUTH TWICE DAILY WITH MEALS   semaglutide (OZEMPIC) 1 MG/DOSE pen 6/20/2019  No Yes   Sig: INJECT 1MG UNDER SKIN ONCE WEEKLY      Facility-Administered Medications: None     Allergies   Allergies   Allergen Reactions     Niacin Swelling     SIMCOR caused edema in extremities     Simvastatin Swelling     SIMCOR caused edema in extremities        Physical Exam   Vital Signs: Temp: 98.3  F (36.8  C) Temp src: Temporal BP: 158/72 Pulse: 63   Resp: 18 SpO2: 97 % O2 Device: None (Room air)    Weight: 0 lbs 0 oz    Gen:  NAD, A&Ox3.  Eyes:  PERRL, sclera anicteric.  OP:  MMM, no lesions.  Neck:  Supple.  CV:  Regular, no murmurs.  Lung:  CTA b/l, normal effort.  Ab:  +BS, soft.  Skin:  Warm, dry to touch.  Erythema on left shin.  Ulcerations on plantar aspect of feet bilaterally.  Ext:  No pitting edema LE b/l.    Data   Data reviewed today: I reviewed all medications, new labs and imaging results over the last 24 hours.    Recent Labs   Lab 06/24/19  1453   WBC 7.6   HGB 13.0*   MCV 90         POTASSIUM 4.0   CHLORIDE 102   CO2 31   BUN 16   CR 0.98   ANIONGAP 5   NARA 8.8   *

## 2019-06-24 NOTE — ED NOTES
[facility]  ED Nurse Handoff Report    Crispin Rojas is a 59 year old male   ED Chief complaint: Foot Pain  . ED Diagnosis:   Final diagnoses:   Cellulitis of left lower extremity   Diabetic foot ulcer (H)     Allergies:   Allergies   Allergen Reactions     Niacin Swelling     SIMCOR caused edema in extremities     Simvastatin Swelling     SIMCOR caused edema in extremities       Code Status: Full Code  Activity level - Baseline/Home:  Independent. Activity Level - Current:   Stand by Assist. Lift room needed: No. Bariatric: No   Needed: No   Isolation: No. Infection: Not Applicable.     Vital Signs:   Vitals:    06/24/19 1414 06/24/19 1500 06/24/19 1600   BP: (!) 155/92 132/65 151/71   Pulse: 68 70 64   Resp: 18     Temp: 98.3  F (36.8  C)     TempSrc: Temporal     SpO2: 98% 94% 96%       Cardiac Rhythm:  ,      Pain level:    Patient confused: No. Patient Falls Risk: Yes.   Elimination Status: Has voided   Patient Report - Initial Complaint: foot pain. Focused Assessment: Crispin Rojas is a 59 year old male with a history of diabetes who presents with foot pain. The patient reports that on 6/20/19 his right foot started developing a sore and on 6/22/199, his left foot developed a sore as well. He also states that he has had redness and soreness of his right leg, although denies pain in his feet. He attributes his sores to new foot inserts. He reports treating the sores with vasatrason and iodine and believes that they have been draining. He has had his first toe of his left foot amputated due to previous infection. He notes decreased appetite for the past 4 days with a fever but denies diarrhea, nausea, vomiting, shortness of breath, and chest pain.    Tests Performed:   Labs Ordered and Resulted from Time of ED Arrival Up to the Time of Departure from the ED   CBC WITH PLATELETS DIFFERENTIAL - Abnormal; Notable for the following components:       Result Value    Hemoglobin 13.0 (*)      All other components within normal limits   BASIC METABOLIC PANEL - Abnormal; Notable for the following components:    Glucose 168 (*)     All other components within normal limits   LACTIC ACID WHOLE BLOOD   NURSING DRAW AND HOLD     No orders to display     Treatments provided: 1L NS, Zosyn 4.5g (see MAR)  Family Comments: NA  OBS brochure/video discussed/provided to patient:  N/A  ED Medications:   Medications   piperacillin-tazobactam (ZOSYN) intermittent infusion 4.5 g (4.5 g Intravenous New Bag 6/24/19 1621)   0.9% sodium chloride BOLUS (0 mLs Intravenous Stopped 6/24/19 1619)     Drips infusing:  No  For the majority of the shift, the patient's behavior Green. Interventions performed were none.     Severe Sepsis OR Septic Shock Diagnosis Present: No      ED Nurse Name/Phone Number: Melina PASCALE Rashid,   4:24 PM  [facility]  ED Nurse Handoff Report    Crispin Rojas is a 59 year old male   ED Chief complaint: Foot Pain  . ED Diagnosis:   Final diagnoses:   Cellulitis of left lower extremity   Diabetic foot ulcer (H)     Allergies:   Allergies   Allergen Reactions     Niacin Swelling     SIMCOR caused edema in extremities     Simvastatin Swelling     SIMCOR caused edema in extremities       Code Status: Full Code  Activity level - Baseline/Home:  Independent. Activity Level - Current:   Independent. Lift room needed: No. Bariatric: No   Needed: No   Isolation: No. Infection: Not Applicable.     Vital Signs:   Vitals:    06/24/19 1414 06/24/19 1500 06/24/19 1600 06/24/19 1700   BP: (!) 155/92 132/65 151/71 158/72   Pulse: 68 70 64 63   Resp: 18      Temp: 98.3  F (36.8  C)      TempSrc: Temporal      SpO2: 98% 94% 96% 97%       ED Medications:   Medications   0.9% sodium chloride BOLUS (0 mLs Intravenous Stopped 6/24/19 1619)   piperacillin-tazobactam (ZOSYN) intermittent infusion 4.5 g (0 g Intravenous Stopped 6/24/19 1703)     Drips infusing:  No  For the majority of the shift,  the patient's behavior Green. Interventions performed were .     Severe Sepsis OR Septic Shock Diagnosis Present: No      RECEIVING UNIT ED HANDOFF REVIEW    Above ED Nurse Handoff Report was reviewed: Yes  Reviewed by: Melina Alexandra on June 24, 2019 at 5:19 PM

## 2019-06-24 NOTE — ED PROVIDER NOTES
History     Chief Complaint:  Foot Pain    HPI   Crispin Rojas is a 59 year old male with a history of diabetes who presents with foot pain. The patient reports that on 6/20/19 his right foot started developing a sore and on 6/22/199, his left foot developed a sore as well. He also states that he has had redness and soreness of his right leg, although denies pain in his feet. He attributes his sores to new foot inserts. He reports treating the sores with vasatrason and iodine and believes that they have been draining. He has had his first toe of his left foot amputated due to previous infection. He notes decreased appetite for the past 4 days with a fever but denies diarrhea, nausea, vomiting, shortness of breath, and chest pain.     Allergies:  Niacin  Simvastatin    Medications:    Norvasc  Metformin  Lipitor     Past Medical History:    Cerebral infarction  Chronic infection  Heartburn  Hypertension  Obesity  Obstructive sleep apnea  Renal tone  Type II diabetes mellitus  Thoracic aortic aneurism without rupture  Hyperlipidemia    Past Surgical History:    Amputate foot (L)  Amputate toes (R)  Angiogram  Recession Gastrocnemius (R)    Family History:    Arthritis  Connective Tissue Disorder  Diabetes  Cerebrovascular Disease  Cancer    Social History:  Smoking status: No  Alcohol use: Yes, red wine 2 x per week  Drug use: No  PCP: Aden Lopez  Marital Status:   [2]    Review of Systems   Constitutional: Positive for appetite change and fever.   Respiratory: Negative for cough and shortness of breath.    Cardiovascular: Negative for chest pain.   Gastrointestinal: Negative for abdominal pain, diarrhea, nausea and vomiting.   Skin: Positive for color change and wound.   All other systems reviewed and are negative.      Physical Exam     Patient Vitals for the past 24 hrs:   BP Temp Temp src Pulse Resp SpO2   06/24/19 1500 132/65 -- -- 70 -- 94 %   06/24/19 1414 (!) 155/92 98.3  F (36.8  C)  Temporal 68 18 98 %     Physical Exam  Constitutional: well appearing, no acute distress.   Head: No external signs of trauma noted to head or face.   Eyes: Pupils are equal, round, and reactive to light. Conjunctiva normal.  ENT: MMM. Normal voice.   Neck: normal ROM.    Cardiovascular: Normal rate, regular rhythm, and intact distal pulses.    Respiratory: Effort normal. No respiratory distress. Lungs clear to auscultation bilaterally.   GI: Soft. There is no tenderness. There is no rebound.   Musculoskeletal: No deformities appreciated. Normal ROM. No edema noted. No bony tenderness to lower extremities. Distal pulses intact. Extremities warm and well perfused.  Neurological: Alert and Oriented x 3. Speech normal. Moves all extremities equally. Sensation and strength intact in lower extremities bilaterally.  Psychiatric: Appropriate mood, affect, and behavior.   Skin: Skin is warm and dry. ulceration noted to right foot over lateral plantar aspect. No erythema, drainage, or warmth. Ulceration noted to plantar aspect of first MTP joint and metatarsal with erythema and warmth spreading up the foot and anterior lower leg to the knee. No drainage from wound.        Emergency Department Course   Laboratory:  CBC: HGB 13.0 (L) o/w WNL (WBC 7.6, )  BMP: Glucose 168 (H) WNL (Creatinine 0.98)  Lactic acid whole blood: 1.5    Interventions:  1459: NS 1L IV Bolus    Emergency Department Course:  Past medical records, nursing notes, and vitals reviewed.  1426: I performed an exam of the patient and obtained history, as documented above.  IV inserted and blood drawn.    Findings and plan explained to the Patient who consents to admission.     1608: Discussed the patient with Dr. Gordon Lerner, who will admit the patient to a Adult Med/Surg bed for further monitoring, evaluation, and treatment.     Impression & Plan    Medical Decision Making:  Crispin Rojas is a 59 year old male who presents for evaluation of foot  wounds and skin redness.  The history, physical exam and supporting data are consistent with infected diabetic foot ulcer and cellulitis.  There does not appear at this time to be any complication of cellulitis including necrotizing fascitis, osteomyelitis, sepsis, or shock. Lactate is normal. He is afebrile here. There is no abscess amenable to incision and drainage on exam at this time.  Given history of DM and rapid progression of redness up LLE, will admit to medicine for further cares and IV antibiotics.  Stable for admission.       Diagnosis:    ICD-10-CM   1. Cellulitis of left lower extremity L03.116   2. Diabetic foot ulcer (H) E11.621    L97.509     Disposition:  Admitted to Adult med/surg    Surjit Squires  6/24/2019   [facility] EMERGENCY DEPARTMENT  Surjit ANDERSON, am serving as a scribe at 2:26 PM on 6/24/2019 to document services personally performed by Goldie Zepeda PA-C based on my observations and the provider's statements to me.        Goldie Zepeda PA-C  06/24/19 1767

## 2019-06-25 ENCOUNTER — TELEPHONE (OUTPATIENT)
Dept: FAMILY MEDICINE | Facility: CLINIC | Age: 59
End: 2019-06-25

## 2019-06-25 ENCOUNTER — APPOINTMENT (OUTPATIENT)
Dept: GENERAL RADIOLOGY | Facility: CLINIC | Age: 59
End: 2019-06-25
Attending: PODIATRIST
Payer: COMMERCIAL

## 2019-06-25 LAB
ANION GAP SERPL CALCULATED.3IONS-SCNC: 4 MMOL/L (ref 3–14)
BUN SERPL-MCNC: 14 MG/DL (ref 7–30)
CALCIUM SERPL-MCNC: 8.1 MG/DL (ref 8.5–10.1)
CHLORIDE SERPL-SCNC: 110 MMOL/L (ref 94–109)
CO2 SERPL-SCNC: 27 MMOL/L (ref 20–32)
CREAT SERPL-MCNC: 0.92 MG/DL (ref 0.66–1.25)
ERYTHROCYTE [DISTWIDTH] IN BLOOD BY AUTOMATED COUNT: 13.6 % (ref 10–15)
GFR SERPL CREATININE-BSD FRML MDRD: >90 ML/MIN/{1.73_M2}
GLUCOSE BLDC GLUCOMTR-MCNC: 104 MG/DL (ref 70–99)
GLUCOSE BLDC GLUCOMTR-MCNC: 174 MG/DL (ref 70–99)
GLUCOSE BLDC GLUCOMTR-MCNC: 78 MG/DL (ref 70–99)
GLUCOSE BLDC GLUCOMTR-MCNC: 91 MG/DL (ref 70–99)
GLUCOSE SERPL-MCNC: 120 MG/DL (ref 70–99)
HCT VFR BLD AUTO: 39 % (ref 40–53)
HGB BLD-MCNC: 12.3 G/DL (ref 13.3–17.7)
MCH RBC QN AUTO: 28.8 PG (ref 26.5–33)
MCHC RBC AUTO-ENTMCNC: 31.5 G/DL (ref 31.5–36.5)
MCV RBC AUTO: 91 FL (ref 78–100)
PLATELET # BLD AUTO: 172 10E9/L (ref 150–450)
POTASSIUM SERPL-SCNC: 4.4 MMOL/L (ref 3.4–5.3)
RBC # BLD AUTO: 4.27 10E12/L (ref 4.4–5.9)
SODIUM SERPL-SCNC: 141 MMOL/L (ref 133–144)
WBC # BLD AUTO: 6.8 10E9/L (ref 4–11)

## 2019-06-25 PROCEDURE — 99254 IP/OBS CNSLTJ NEW/EST MOD 60: CPT | Mod: 25 | Performed by: PODIATRIST

## 2019-06-25 PROCEDURE — 25000131 ZZH RX MED GY IP 250 OP 636 PS 637: Performed by: INTERNAL MEDICINE

## 2019-06-25 PROCEDURE — 25000132 ZZH RX MED GY IP 250 OP 250 PS 637: Performed by: INTERNAL MEDICINE

## 2019-06-25 PROCEDURE — 11042 DBRDMT SUBQ TIS 1ST 20SQCM/<: CPT | Performed by: PODIATRIST

## 2019-06-25 PROCEDURE — 80048 BASIC METABOLIC PNL TOTAL CA: CPT | Performed by: INTERNAL MEDICINE

## 2019-06-25 PROCEDURE — 99232 SBSQ HOSP IP/OBS MODERATE 35: CPT | Performed by: INTERNAL MEDICINE

## 2019-06-25 PROCEDURE — 12000000 ZZH R&B MED SURG/OB

## 2019-06-25 PROCEDURE — 25000128 H RX IP 250 OP 636: Performed by: INTERNAL MEDICINE

## 2019-06-25 PROCEDURE — G0463 HOSPITAL OUTPT CLINIC VISIT: HCPCS | Mod: 25

## 2019-06-25 PROCEDURE — 73630 X-RAY EXAM OF FOOT: CPT | Mod: 50

## 2019-06-25 PROCEDURE — 00000146 ZZHCL STATISTIC GLUCOSE BY METER IP

## 2019-06-25 PROCEDURE — 36415 COLL VENOUS BLD VENIPUNCTURE: CPT | Performed by: INTERNAL MEDICINE

## 2019-06-25 PROCEDURE — 0JBR0ZZ EXCISION OF LEFT FOOT SUBCUTANEOUS TISSUE AND FASCIA, OPEN APPROACH: ICD-10-PCS | Performed by: PODIATRIST

## 2019-06-25 PROCEDURE — 97602 WOUND(S) CARE NON-SELECTIVE: CPT

## 2019-06-25 PROCEDURE — 85027 COMPLETE CBC AUTOMATED: CPT | Performed by: INTERNAL MEDICINE

## 2019-06-25 PROCEDURE — 82947 ASSAY GLUCOSE BLOOD QUANT: CPT | Performed by: INTERNAL MEDICINE

## 2019-06-25 RX ADMIN — GABAPENTIN 300 MG: 300 CAPSULE ORAL at 20:50

## 2019-06-25 RX ADMIN — LISINOPRIL 20 MG: 20 TABLET ORAL at 22:42

## 2019-06-25 RX ADMIN — TAZOBACTAM SODIUM AND PIPERACILLIN SODIUM 3.38 G: 375; 3 INJECTION, SOLUTION INTRAVENOUS at 05:11

## 2019-06-25 RX ADMIN — TAZOBACTAM SODIUM AND PIPERACILLIN SODIUM 3.38 G: 375; 3 INJECTION, SOLUTION INTRAVENOUS at 10:20

## 2019-06-25 RX ADMIN — TAZOBACTAM SODIUM AND PIPERACILLIN SODIUM 3.38 G: 375; 3 INJECTION, SOLUTION INTRAVENOUS at 22:47

## 2019-06-25 RX ADMIN — TAZOBACTAM SODIUM AND PIPERACILLIN SODIUM 3.38 G: 375; 3 INJECTION, SOLUTION INTRAVENOUS at 16:38

## 2019-06-25 RX ADMIN — GABAPENTIN 300 MG: 300 CAPSULE ORAL at 08:18

## 2019-06-25 RX ADMIN — INSULIN ASPART 20 UNITS: 100 INJECTION, SOLUTION INTRAVENOUS; SUBCUTANEOUS at 13:32

## 2019-06-25 RX ADMIN — ATORVASTATIN CALCIUM 40 MG: 40 TABLET, FILM COATED ORAL at 22:42

## 2019-06-25 RX ADMIN — AMLODIPINE BESYLATE 5 MG: 5 TABLET ORAL at 22:42

## 2019-06-25 RX ADMIN — GABAPENTIN 300 MG: 300 CAPSULE ORAL at 13:31

## 2019-06-25 ASSESSMENT — ACTIVITIES OF DAILY LIVING (ADL)
SWALLOWING: 0-->SWALLOWS FOODS/LIQUIDS WITHOUT DIFFICULTY
ADLS_ACUITY_SCORE: 13
AMBULATION: 0-->INDEPENDENT
TOILETING: 0-->INDEPENDENT
ADLS_ACUITY_SCORE: 10
TRANSFERRING: 0-->INDEPENDENT
ADLS_ACUITY_SCORE: 13
RETIRED_COMMUNICATION: 0-->UNDERSTANDS/COMMUNICATES WITHOUT DIFFICULTY
COGNITION: 0 - NO COGNITION ISSUES REPORTED
ADLS_ACUITY_SCORE: 13
DRESS: 0-->INDEPENDENT
BATHING: 0-->INDEPENDENT
RETIRED_EATING: 0-->INDEPENDENT
ADLS_ACUITY_SCORE: 10
FALL_HISTORY_WITHIN_LAST_SIX_MONTHS: NO
ADLS_ACUITY_SCORE: 13

## 2019-06-25 NOTE — PROGRESS NOTES
[facility]    Medicine Progress Note - Hospitalist Service       Date of Admission:  6/24/2019  Assessment & Plan   Crispin Rojas is a 59 year old male admitted on 6/24/2019. He has a past medical history significant for hypertension, hyperlipidemia, diabetes mellitus, GERD, previous stroke, and sleep apnea.  He presented to emergency room with fever and left leg pain.  Found to have left leg cellulitis.     1.  Left leg cellulitis.  Likely related to left foot diabetic ulcer.  Continue IV Zosyn.  Podiatry consult.       2.  Bilateral diabetic foot ulcers.  Wound nurse consult.  Podiatry consult.  Pain medications as needed.  IV Zosyn as noted above.     3.  Diabetes mellitus.  Continue long-acting insulin and NovoLog scheduled and sliding scale.  Hold metformin and semaglutide for now.     4.  Hypertension.  Continue amlodipine and lisinopril.     5.  Hyperlipidemia.  Continue atorvastatin.     Diet: Moderate Consistent CHO Diet    DVT Prophylaxis: Pneumatic Compression Devices  Mccarthy Catheter: not present  Code Status: Full Code      Disposition Plan   Expected discharge: 2 - 3 days, recommended to prior living arrangement   Entered: Gordon Lerner DO 06/25/2019, 12:41 PM           Gordon Lerner DO  Hospitalist Service  [facility]    ______________________________________________________________________    Interval History   Having only mild left leg pain.  Did not notice fevers or chills this morning.  No chest pain, shortness of breath, nausea, vomiting, or diarrhea.    Data reviewed today: I reviewed all medications, new labs and imaging results over the last 24 hours.     Physical Exam   Vital Signs: Temp: 96.8  F (36  C) Temp src: Oral BP: 135/79 Pulse: 62   Resp: 18 SpO2: 95 % O2 Device: None (Room air)    Weight: 0 lbs 0 oz  Gen:  NAD, A&Ox3.  Eyes:  PERRL, sclera anicteric.  OP:  MMM, no lesions.  Neck:  Supple.  CV:  Regular, no murmurs.  Lung:  CTA b/l, normal  effort.  Ab:  +BS, soft.  Skin:  Warm, dry to touch.  Left leg erythema.  Ext:  No pitting edema LE b/l.      Data   Recent Labs   Lab 06/25/19  0630 06/24/19  1453   WBC 6.8 7.6   HGB 12.3* 13.0*   MCV 91 90    163    138   POTASSIUM 4.4 4.0   CHLORIDE 110* 102   CO2 27 31   BUN 14 16   CR 0.92 0.98   ANIONGAP 4 5   NARA 8.1* 8.8   * 168*

## 2019-06-25 NOTE — CONSULTS
"[facility]ATION  June 25, 2019      ASSESSMENT:   B/l foot ulcerations  L lower leg cellulitis  DM II with neuropathy     PLAN:  Reviewed patient's chart in epic.  Discussed condition and treatment options including pros and cons.    -Debridement as below.  -Doubt deep infection, but discussed risk of osteomyelitis, further amputation.  -Will order plain XRs to eval for evidence of osteomyelitis.  -MRIs may be needed pending results/progress.  -ESR, CRP in AM.  -Wound care per WOC RN.  -IV abx per hospitalist.  -Dr. Molina to f/u tomorrow.    Procedure:   After verbal consent, excisional debridement was performed on R 4th toe and L 1st ray ulcer.  #10 blade, scissors, pickup used to debride ulcers down to and including subcutaneous tissue. No anesthesia was used due to neuropathy. Patient tolerated procedure well.  RN applied iodosorb dressings.  Other wounds did not need debridement.    Jack Younger DPM, FACFAS  Pager: [phone]      PATIENT HISTORY:  Crispin Rojas is a 59 year old male who was admitted for LLE cellulitis, hx of b/l foot wounds.  Wounds are fairly new per pt, within the past week.  He \"pulled some skin off\" the bottom of the right foot.  Increasing redness over the past few days on the LLE.  Hx of R partial great toe amputation in 2016, L partial great toe amputation in 2016, for osteomyelitis.  He has not been seen by  Podiatry for about 6 months.  Pt has tried dressing the wounds at home.      I was requested to see this patient for this issue by Dr. Lerner.    Review of Systems:  Patient denies f/c/n/v.  Rest of 10 pt ROS neg except for HPI.     PAST MEDICAL HISTORY:   Past Medical History:   Diagnosis Date     Cerebral infarction (H)      Chronic infection      H/O heartburn      History of heartburn      Hypertension      Numbness and tingling      Obesity      GILA (obstructive sleep apnea) 10/22/2018     Renal stone      Type II or " unspecified type diabetes mellitus without mention of complication, not stated as uncontrolled         PAST SURGICAL HISTORY:   Past Surgical History:   Procedure Laterality Date     AMPUTATE FOOT Left 8/18/2016    Procedure: AMPUTATE FOOT;  Surgeon: Jack Younger DPM;  Location: [facility] OR     AMPUTATE TOE(S) Right 2/1/2016    Procedure: AMPUTATE TOE(S);  Surgeon: Rachelle Manriquez DPM, Pod;  Location: [facility] OR     ANGIOGRAM       COLONOSCOPY       COMBINED CYSTOSCOPY, RETROGRADES, URETEROSCOPY, INSERT STENT Left 8/21/2016    Procedure: COMBINED CYSTOSCOPY, RETROGRADES, URETEROSCOPY, INSERT STENT;  Surgeon: Artemio Valenzuela MD;  Location: [facility] OR     COMBINED CYSTOSCOPY, RETROGRADES, URETEROSCOPY, LASER HOLMIUM LITHOTRIPSY URETER(S), INSERT STENT Left 10/3/2016    Procedure: COMBINED CYSTOSCOPY, RETROGRADES, URETEROSCOPY, LASER HOLMIUM LITHOTRIPSY URETER(S), INSERT STENT;  Surgeon: Artemio Valenzuela MD;  Location: [facility] OR     EXTRACORPOREAL SHOCK WAVE LITHOTRIPSY (ESWL) Left 9/8/2016    Procedure: EXTRACORPOREAL SHOCK WAVE LITHOTRIPSY (ESWL);  Surgeon: Artemio Valenzuela MD;  Location: [facility] OR     RECESSION GASTROCNEMIUS Right 2/1/2016    Procedure: RECESSION GASTROCNEMIUS;  Surgeon: Rachelle Manriquez DPM, Pod;  Location: [facility] OR        MEDICATIONS:   Current Facility-Administered Medications:      acetaminophen (TYLENOL) Suppository 650 mg, 650 mg, Rectal, Q4H PRN, Gordon Lerner D, DO     acetaminophen (TYLENOL) tablet 650 mg, 650 mg, Oral, Q4H PRN, Gordon Lerner, DO, 650 mg at 06/24/19 1930     amLODIPine (NORVASC) tablet 5 mg, 5 mg, Oral, At Bedtime, Gordon Lerner, DO, 5 mg at 06/24/19 2215     atorvastatin (LIPITOR) tablet 40 mg, 40 mg, Oral, At Bedtime, Gordon Lerner, DO, 40 mg at 06/24/19 2215     bisacodyl (DULCOLAX) Suppository 10 mg, 10 mg, Rectal, Daily PRN, Gordon Lerner, DO     glucose gel 15-30 g, 15-30 g, Oral, Q15 Min PRN **OR** dextrose 50 % injection 25-50 mL, 25-50  mL, Intravenous, Q15 Min PRN **OR** glucagon injection 1 mg, 1 mg, Subcutaneous, Q15 Min PRN, Gordon Lerner, DO     gabapentin (NEURONTIN) capsule 300 mg, 300 mg, Oral, TID, Gordon Lerner, DO, 300 mg at 06/25/19 1331     HYDROmorphone (PF) (DILAUDID) injection 0.2 mg, 0.2 mg, Intravenous, Q2H PRN, Gordon Lerner, DO     insulin aspart (NovoLOG) inj (RAPID ACTING), 35 Units, Subcutaneous, Daily with lunch, Gordon Lerner, DO, 20 Units at 06/25/19 1332     insulin aspart (NovoLOG) inj (RAPID ACTING), 18 Units, Subcutaneous, BID AC, Gordon Lerner, DO, 18 Units at 06/24/19 2016     insulin aspart (NovoLOG) inj (RAPID ACTING), 1-10 Units, Subcutaneous, TID AC, Gordon Lerner, DO     insulin aspart (NovoLOG) inj (RAPID ACTING), 1-7 Units, Subcutaneous, At Bedtime, Gordon Lerner,      insulin degludec (TRESIBA) 100 UNIT/ML injection 50 Units, 50 Units, Subcutaneous, At Bedtime, Gordon Lerner, DO, 50 Units at 06/24/19 2256     lidocaine (LMX4) cream, , Topical, Q1H PRN, Gordon Lerner, DO     lidocaine 1 % 0.1-1 mL, 0.1-1 mL, Other, Q1H PRN, Gordon Lerner, DO     lisinopril (PRINIVIL/ZESTRIL) tablet 20 mg, 20 mg, Oral, At Bedtime, Gordon Lerner, DO, 20 mg at 06/24/19 2215     melatonin tablet 1 mg, 1 mg, Oral, At Bedtime PRN, Gordon Lerner, DO     naloxone (NARCAN) injection 0.1-0.4 mg, 0.1-0.4 mg, Intravenous, Q2 Min PRN, Gordon Lerner, DO     ondansetron (ZOFRAN-ODT) ODT tab 4 mg, 4 mg, Oral, Q6H PRN **OR** ondansetron (ZOFRAN) injection 4 mg, 4 mg, Intravenous, Q6H PRN, Gordon Lerner DO     oxyCODONE (ROXICODONE) tablet 5-10 mg, 5-10 mg, Oral, Q3H PRN, Gordon Lerner DO     piperacillin-tazobactam (ZOSYN) infusion 3.375 g, 3.375 g, Intravenous, Q6H, Gordon Lerner, , Last Rate: 100 mL/hr at 06/25/19 1638, 3.375 g at 06/25/19 1638     polyethylene glycol (MIRALAX/GLYCOLAX) Packet 17 g, 17 g, Oral, Daily PRN, Gordon Lerner  D, DO     prochlorperazine (COMPAZINE) injection 10 mg, 10 mg, Intravenous, Q6H PRN **OR** prochlorperazine (COMPAZINE) tablet 10 mg, 10 mg, Oral, Q6H PRN **OR** prochlorperazine (COMPAZINE) Suppository 25 mg, 25 mg, Rectal, Q12H PRN, Gordon Lerner, DO     senna-docusate (SENOKOT-S/PERICOLACE) 8.6-50 MG per tablet 1 tablet, 1 tablet, Oral, BID PRN **OR** senna-docusate (SENOKOT-S/PERICOLACE) 8.6-50 MG per tablet 2 tablet, 2 tablet, Oral, BID PRN, Gordon Lerner, DO     sodium chloride (PF) 0.9% PF flush 3 mL, 3 mL, Intracatheter, q1 min prn, Gordon Lerner, DO     sodium chloride (PF) 0.9% PF flush 3 mL, 3 mL, Intracatheter, Q8H, Gordon Lerner, DO, 3 mL at 06/24/19 1931     sodium chloride 0.9% infusion, , Intravenous, Continuous, Gordon Lerner, DO, Last Rate: 100 mL/hr at 06/25/19 0814     ALLERGIES:    Allergies   Allergen Reactions     Niacin Swelling     SIMCOR caused edema in extremities     Simvastatin Swelling     SIMCOR caused edema in extremities        SOCIAL HISTORY:   Social History     Socioeconomic History     Marital status:      Spouse name: Sydney     Number of children: 2     Years of education: Not on file     Highest education level: Not on file   Occupational History     Occupation: marketing     Employer: [employer]     Comment: [employer]   Social Needs     Financial resource strain: Not on file     Food insecurity:     Worry: Not on file     Inability: Not on file     Transportation needs:     Medical: Not on file     Non-medical: Not on file   Tobacco Use     Smoking status: Never Smoker     Smokeless tobacco: Never Used   Substance and Sexual Activity     Alcohol use: Yes     Alcohol/week: 0.0 oz     Comment: rare---red wine 2x per week     Drug use: No     Sexual activity: Yes     Partners: Female   Lifestyle     Physical activity:     Days per week: Not on file     Minutes per session: Not on file     Stress: Not on file   Relationships     Social  connections:     Talks on phone: Not on file     Gets together: Not on file     Attends Jainism service: Not on file     Active member of club or organization: Not on file     Attends meetings of clubs or organizations: Not on file     Relationship status: Not on file     Intimate partner violence:     Fear of current or ex partner: Not on file     Emotionally abused: Not on file     Physically abused: Not on file     Forced sexual activity: Not on file   Other Topics Concern     Parent/sibling w/ CABG, MI or angioplasty before 65F 55M? No   Social History Narrative     Not on file        FAMILY HISTORY:   Family History   Problem Relation Age of Onset     Arthritis Mother         SLE     Connective Tissue Disorder Mother         lupus     Diabetes Mother      Cerebrovascular Disease Mother      Cancer Mother      Diabetes Father      Diabetes Maternal Grandfather      Diabetes Sister         EXAM:Vitals: /60   Pulse 65   Temp 97  F (36.1  C) (Oral)   Resp 18   SpO2 93%   BMI= There is no height or weight on file to calculate BMI.    General appearance: Patient is alert and fully cooperative with history & exam.  No sign of distress is noted during the visit.     Psychiatric: Affect is pleasant & appropriate.  Patient appears motivated to improve health.     Respiratory: Breathing is regular & unlabored while sitting.     HEENT: Hearing is intact to spoken word.  Speech is clear.  No gross evidence of visual impairment that would impact ambulation.     Dermatologic:   R dorsolateral 4th toe with ulceration with exposed fat layer, 0.5 x 0.5cm.  No probing to bone.  R plantar forefoot 1st met area with 2 x 0.5 x 0.1cm area of eschar, plantar 4th met area with 3 x 0.5 x 0.1cm area of skin breakdown/skin tear.  No erythema on the R.  L plantar 1st met head area with 2.0 x 3.0 cm area of skin breakdown/eschar with some callusing.  Mild erythema here.  More erythema of L lower leg noted.     Vascular: DP & PT  pulses are intact & regular bilaterally.  No significant edema or varicosities noted.  CFT and skin temperature are normal to both lower extremities.     Neurologic: Lower extremity sensation is diminished to light touch b/l.    Musculoskeletal: s/p R partial 1st toe amputation, L total 1st toe amputation.  Patient is ambulatory without assistive device or brace.  No gross ankle deformity noted.  No foot or ankle joint effusion is noted.       Imaging pending.      Lab Results   Component Value Date    WBC 6.8 06/25/2019     Lab Results   Component Value Date    RBC 4.27 06/25/2019     Lab Results   Component Value Date    HGB 12.3 06/25/2019     Lab Results   Component Value Date    HCT 39.0 06/25/2019     No components found for: MCT  Lab Results   Component Value Date    MCV 91 06/25/2019     Lab Results   Component Value Date    MCH 28.8 06/25/2019     Lab Results   Component Value Date    MCHC 31.5 06/25/2019     Lab Results   Component Value Date    RDW 13.6 06/25/2019     Lab Results   Component Value Date     06/25/2019

## 2019-06-25 NOTE — TELEPHONE ENCOUNTER
I won't be rounding until after clinic.  I'm not planning surgery today so pt can eat.  Called floor and informed them.

## 2019-06-25 NOTE — PLAN OF CARE
Pt is stable and afebrile upon arrival to room 626. BG is 129.  Tray arrived late, after 1930 due to kitchen error.  BLEs bahman, with slightly open ulcer, draining serosang on left bottom of foot.  +pedal pulses.

## 2019-06-25 NOTE — PLAN OF CARE
Up independently in room. Denied any pain during shift. Bilateral foot wounds dressed and are clean, dry, and intact. Podiatry & WOC consult today. Voiding appropriately. Vitals WNL. NPO since 0000.

## 2019-06-25 NOTE — TELEPHONE ENCOUNTER
Reason for Call:  Other appointment    Detailed comments: Hien from [facility] called and wanted to know when pod was planing on seeing this Pt ans he is NPO.     Phone Number Contact can be reached at: Other phone number: [phone]    Best Time: Any     Can we leave a detailed message on this number? Not Applicable    Call taken on 6/25/2019 at 11:45 AM by Cristina Ram

## 2019-06-25 NOTE — PROGRESS NOTES
"[facility] Nurse Inpatient Wound Assessment     Assessment of wound(s) on pt's:   Bilateral foot        Data:   Patient History:      per MD note(s): 59 year old male admitted on 6/24/2019. He has a past medical history significant for hypertension, hyperlipidemia, diabetes mellitus, GERD, previous stroke, and sleep apnea.  He presented to emergency room with fever and left leg pain.  Found to have left leg cellulitis.     Past amputations of Great toe; pt has been followed by Podiatry and has orthotics but states that they have caused him blisters and several wounds since he is wearing them. Pt was cautioned to return to DP for adjustment of orthotics to prevent further injury and possible amputation but he stated there were work conflicts that prevented the follow up with DPM.       Current Diet / Nutrition:       Orders Placed This Encounter        NPO for Medical/Clinical Reasons Except for: No Exceptions             Ben Risk Assessment  Sensory Perception: 3-->slightly limited    Moisture: 4-->rarely moist   Activity: 3-->walks occasionally     Mobility: 4-->no limitation   Nutrition: 3-->adequate   Ben Score: 20    Mattress:  Standard , Atmos Air mattress    Labs:     Recent Labs   Lab Test 06/25/19  0630 06/24/19  1828  01/10/19  1105  11/27/17  0724  08/19/13   ALBUMIN  --   --   --  4.0   < >  --    < > 4.1  4.1   HGB 12.3*  --    < >  --    < >  --    < >  --    RBC 4.27*  --    < >  --    < >  --    < >  --    WBC 6.8  --    < >  --    < > 6.1   < >  --      --    < >  --    < >  --    < >  --    INR  --   --   --   --   --   --   --  1.04   A1C  --  7.7*   < > 7.5*   < >  --    < >  --    CRP  --   --   --   --   --  3.9   < >  --     < > = values in this interval not displayed.        Wound Assessment (location #1):   Right foot  Wound History:  Pt reports using tape to cover the wound and developed a skin tear to foot. \"callous\" on his 4th toe is very chronic and most " "concerning    Specific Dimensions (length x width x depth, in cm):       Right plantar 1st Met: 2 x 0.5 x 0.1cm dry callous with red dry dermis    Plantar 4-5 met: 3 x 0.5 x 0.1cm dry red dermis/ scab- tape skin tear    Right Lateral 4 th toe: 0.5 x 0.5cm  100% dry yellow adhered slough    Periwound Skin: intact dry skin and calloused,      Pain:  absent ,                   Wound Assessment:   Left 1st met    Wound History:   Chronic \"comes and goes\"  Specific Dimensions (length x width x depth, in cm) :   2 x 3cm dry red callous  Periwound Skin: thick loose callous and erythema extending up the leg to knee,    Pain:  absent ,                     Intervention:     Patient's chart evaluated.      Wound(s) was assessed    Wound Care: was done:  Washed and clean dressing applied in prep for DPM    Orders  Written    Supplies  ordered: Iodosorb, gathered, placed at the bedside and discussed with RN    Discussed plan of care with Patient and Nurse          Assessment:       DFU; podiatry is also consulted and will see pt later today. Pt has extensive podiatry issues due to DM and seems to be living in a world of \"that won't happen to me\". Jhoan discussion with pt about his current status and past amputations and need for CLOSE follow up with DPM for foot care and prevention of further amputations.  Will recommend to use Iodosorb gel to debride wounds and promote wound healing will decreasing biofilm.     Pt has been using his own concoction of Betadine, Wound Gel and or Bacitracin as treatment for wounds.         Plan:     Nursing to notify the Provider(s) and re-consult the [facility] Nurse if wound(s) deteriorate(s) or if the wound care plan needs reevaluation.    Plan of care for wound located on bilateral feet: Daily    1. Wash feet with soap and water, rinse with wound spray    2. Sween to intact skin    3. Apply Grape amount of Iodosorb gel to piece of Adaptic and depress into wound bed    4. Cover with gauze and secure " with Kerlix or Flex Net/ ACE to right side only    WOC Nurse will return: weekly

## 2019-06-25 NOTE — PLAN OF CARE
DAy RN  Augusto, CMS is at baseline (numbness in both feet) mild headache today,declined tylenol. Denied pian in feet  Woc nurse did dressing changes today.  Podiatry coming after clinic today.   NPO until mid shift then mod CHO diet tolerated  well.   Up with SBA protective footwear encouraged.

## 2019-06-26 ENCOUNTER — APPOINTMENT (OUTPATIENT)
Dept: GENERAL RADIOLOGY | Facility: CLINIC | Age: 59
End: 2019-06-26
Attending: PODIATRIST
Payer: COMMERCIAL

## 2019-06-26 LAB
CRP SERPL-MCNC: 36.1 MG/L (ref 0–8)
ERYTHROCYTE [SEDIMENTATION RATE] IN BLOOD BY WESTERGREN METHOD: 50 MM/H (ref 0–20)
GLUCOSE BLDC GLUCOMTR-MCNC: 47 MG/DL (ref 70–99)
GLUCOSE BLDC GLUCOMTR-MCNC: 56 MG/DL (ref 70–99)
GLUCOSE BLDC GLUCOMTR-MCNC: 57 MG/DL (ref 70–99)
GLUCOSE BLDC GLUCOMTR-MCNC: 67 MG/DL (ref 70–99)
GLUCOSE BLDC GLUCOMTR-MCNC: 82 MG/DL (ref 70–99)
GLUCOSE BLDC GLUCOMTR-MCNC: 84 MG/DL (ref 70–99)
GLUCOSE BLDC GLUCOMTR-MCNC: 90 MG/DL (ref 70–99)
GLUCOSE BLDC GLUCOMTR-MCNC: 90 MG/DL (ref 70–99)
GLUCOSE BLDC GLUCOMTR-MCNC: 92 MG/DL (ref 70–99)

## 2019-06-26 PROCEDURE — 12000000 ZZH R&B MED SURG/OB

## 2019-06-26 PROCEDURE — 36415 COLL VENOUS BLD VENIPUNCTURE: CPT | Performed by: PODIATRIST

## 2019-06-26 PROCEDURE — 99232 SBSQ HOSP IP/OBS MODERATE 35: CPT | Performed by: INTERNAL MEDICINE

## 2019-06-26 PROCEDURE — 00000146 ZZHCL STATISTIC GLUCOSE BY METER IP

## 2019-06-26 PROCEDURE — 99232 SBSQ HOSP IP/OBS MODERATE 35: CPT | Performed by: PODIATRIST

## 2019-06-26 PROCEDURE — 25000132 ZZH RX MED GY IP 250 OP 250 PS 637: Performed by: INTERNAL MEDICINE

## 2019-06-26 PROCEDURE — 73660 X-RAY EXAM OF TOE(S): CPT | Mod: RT

## 2019-06-26 PROCEDURE — 99207 ZZC CDG-MDM COMPONENT: MEETS LOW - DOWN CODED: CPT | Performed by: INTERNAL MEDICINE

## 2019-06-26 PROCEDURE — 85652 RBC SED RATE AUTOMATED: CPT | Performed by: PODIATRIST

## 2019-06-26 PROCEDURE — 25000128 H RX IP 250 OP 636: Performed by: INTERNAL MEDICINE

## 2019-06-26 PROCEDURE — 86140 C-REACTIVE PROTEIN: CPT | Performed by: PODIATRIST

## 2019-06-26 RX ADMIN — ATORVASTATIN CALCIUM 40 MG: 40 TABLET, FILM COATED ORAL at 21:20

## 2019-06-26 RX ADMIN — TAZOBACTAM SODIUM AND PIPERACILLIN SODIUM 3.38 G: 375; 3 INJECTION, SOLUTION INTRAVENOUS at 21:22

## 2019-06-26 RX ADMIN — INSULIN ASPART 17 UNITS: 100 INJECTION, SOLUTION INTRAVENOUS; SUBCUTANEOUS at 13:19

## 2019-06-26 RX ADMIN — TAZOBACTAM SODIUM AND PIPERACILLIN SODIUM 3.38 G: 375; 3 INJECTION, SOLUTION INTRAVENOUS at 16:14

## 2019-06-26 RX ADMIN — TAZOBACTAM SODIUM AND PIPERACILLIN SODIUM 3.38 G: 375; 3 INJECTION, SOLUTION INTRAVENOUS at 09:50

## 2019-06-26 RX ADMIN — GABAPENTIN 300 MG: 300 CAPSULE ORAL at 13:31

## 2019-06-26 RX ADMIN — GABAPENTIN 300 MG: 300 CAPSULE ORAL at 07:55

## 2019-06-26 RX ADMIN — LISINOPRIL 20 MG: 20 TABLET ORAL at 21:21

## 2019-06-26 RX ADMIN — GABAPENTIN 300 MG: 300 CAPSULE ORAL at 21:20

## 2019-06-26 RX ADMIN — TAZOBACTAM SODIUM AND PIPERACILLIN SODIUM 3.38 G: 375; 3 INJECTION, SOLUTION INTRAVENOUS at 04:02

## 2019-06-26 RX ADMIN — AMLODIPINE BESYLATE 5 MG: 5 TABLET ORAL at 21:20

## 2019-06-26 ASSESSMENT — ACTIVITIES OF DAILY LIVING (ADL)
ADLS_ACUITY_SCORE: 10

## 2019-06-26 NOTE — CONSULTS
"CLINICAL NUTRITION SERVICES  -  ASSESSMENT NOTE      Recommendations Ordered by Registered Dietitian (RD):   Thera-vit-M   Malnutrition:   % Weight Loss:  None noted  % Intake:  No decreased intake noted  Subcutaneous Fat Loss:  None observed  Muscle Loss:  None observed  Fluid Retention:  None noted    Malnutrition Diagnosis: Patient does not meet two of the above criteria necessary for diagnosing malnutrition     REASON FOR ASSESSMENT  Crispin Rojas is a 59 year old male seen by Registered Dietitian for Admission Nutrition Risk Screen for stageable pressure injuries or large/non-healing wound or burn    NUTRITION HISTORY  - Information obtained from the pt  - Patient is on a regular diet at home  - Typical food/fluid intake:   Breakfast: Breakfast bar or toast 3:30 AM   Lunch: donut or granola bar   Dinner: Brat/Hamburger with potato chips   Second Dinner: Mac&Cheese, Chicken Jennifer, 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   -Pt reported poor oral intake <50% 2 days PTA due to lack of appetite    CURRENT NUTRITION ORDERS  Diet Order:     Mod Consistent Carbohydrate     Current Intake/Tolerance:  Pt said his appetite is good now and he has no symptoms of nausea/vomiting  Ate Lunch and dinner yesterday and had eaten breakfast and lunch so far today  Per flowsheets, he has eaten 100% of every meal so far this admit    NUTRITION FOCUSED PHYSICAL ASSESSMENT (NFPA)  Completed:  Yes Visual assessment only         Observed:    No nutrition-related physical findings observed    Obtained from Chart/Interdisciplinary Team:  +2 swelling mid shin down to foot     Per WOC Nurse note on 6/26:  - Amputation of great toe   - Bilateral foot wounds:   - Right foot wound - \"callous\" on his 4th toe is very chronic  - Left 1st met - thick loose callous and " "erythema extending up the leg to knee    ANTHROPOMETRICS  Height: 5'10.5\"  Weight: 110.9 kg   BMI: 34.6  Weight Status:  Obesity Grade I BMI 30-34.9  IBW: 76.8  % IBW: 144%  Weight History:   Wt Readings from Last 10 Encounters:   04/29/19 110.9 kg (244 lb 9.6 oz)   04/23/19 108.9 kg (240 lb)   03/29/19 109 kg (240 lb 4.8 oz)   01/17/19 111.1 kg (245 lb)   01/10/19 111.5 kg (245 lb 14.4 oz)   12/21/18 113.7 kg (250 lb 9 oz)   12/10/18 113.4 kg (250 lb)   12/03/18 113.4 kg (250 lb)   11/23/18 113.5 kg (250 lb 3.2 oz)   11/09/18 111.2 kg (245 lb 1.6 oz)   -No new weight since 4/29.   -Pt stated his UBW is around 245#    LABS  Labs reviewed  -Hgb A1C 7.7 (H) 6/24     MEDICATIONS  Medications reviewed  -HSSI  -Fixed Insulin, 55 units    ASSESSED NUTRITION NEEDS PER APPROVED PRACTICE GUIDELINES:    Dosing Weight 85.3 (adjusted for overweight)  Estimated Energy Needs: 1134-4987 kcals (20-25 Kcal/Kg)  Justification: obese  Estimated Protein Needs: 128-170 grams protein (1.5-2 g pro/Kg)  Justification: obesity guidelines, wound healing   Estimated Fluid Needs: 0359-5357 kcals (25-30 ml/Kg)  Justification: maintenance    MALNUTRITION:  % Weight Loss:  None noted  % Intake:  No decreased intake noted  Subcutaneous Fat Loss:  None observed  Muscle Loss:  None observed  Fluid Retention:  None noted    Malnutrition Diagnosis: Patient does not meet two of the above criteria necessary for diagnosing malnutrition    NUTRITION DIAGNOSIS:  Increased nutrient needs (protein) related to nonhealing wounds as evidenced by bilateral foot wounds present on admission.       NUTRITION INTERVENTIONS  Recommendations / Nutrition Prescription  Thera-vit-M for wound healing.  Continue on current diet, encourage protein intake at meals.     Implementation  Nutrition education: Provided education on increased protein needs for wound healing.   Multivitamin/Mineral      Nutrition Goals  Pt will continue to consume >75% of meals TID with at least " one high protein food source at each meal.       MONITORING AND EVALUATION:  Progress towards goals will be monitored and evaluated per protocol and Practice Guidelines    [facility]

## 2019-06-26 NOTE — PROGRESS NOTES
[facility]      Assessment:   59-year old male with type 2 DM, peripheral neuropathy, status post bilateral hallux amputation with bilateral foot wounds and LLE cellulitis.   Stable. Current clinical and radiographic exam not suggesting any deep foot infection.    Plan:  I have ordered a lateral radiograph, dedicated to the 4th toe with the toe elevated. This is to make sure there are no dorsal bone changes at the head of the middle phalanx.    Dressing:  Per wound care    Activity: WBAT bilateral offloading shoes (ordered)    Antibiotics: IV Zosyn    If the final radiograph (lateral projection of the right 4th toe) is negative, Podiatry will sign off.  He was instructed to follow up with us 1-2 weeks post discharge.  Wound cares per WOC RN recs.     Addendum:  Excellent lateral x-ray of the right 4th toe does not show any signs of bone infection.  Podiatry will sign off.     Valentino Molina DPM, FACFAS, MS  [place] Department of Podiatry/Foot & Ankle Surgery  [facility], [facility], and [facility]  Pager:  [phone]    _______________________________________________________________________      Subjective:  No complaints. He says that he thought his left leg was looking better, but now more swelling.  He was up in a chair x 4 hours earlier today.     Exam:    B/P: 131/73, T: 97.3, P: 62, R: 20    Vascular: strong pedal pulses bilateral foot; Left leg edeam    Neuro: Lower extremity sensation is diminished to light touch b/l.    Musculoskeletal: s/p R partial 1st toe amputation, L total 1st toe amputation.  Patient is ambulatory without assistive device or brace.  No gross ankle deformity noted.  No foot or ankle joint effusion is noted.    Derm:   1) R dorsolateral 4th toe with ulceration with exposed fat layer, 0.5 x 0.5cm.  No probing to bone.      2) R plantar forefoot 1st met area with 2 x 0.5 x 0.1cm area of eschar, plantar 4th met area with 3 x 0.5 x 0.1cm area of skin  breakdown/skin tear.  No erythema on the R.    3) L plantar 1st met head area with 2.0 x 3.0 cm area of skin breakdown/eschar with some callusing.  Mild erythema here.  More erythema of L lower leg noted.    XR FOOT BILATERAL G/E 3 VW 6/25/2019 8:06 PM     COMPARISON: 8/19/2016 and 11/27/2017.     HISTORY: Bilateral foot wounds at the RIGHT fourth toe and plantar  forefoot as well as the LEFT plantar first metatarsal area.     FINDINGS:  RIGHT foot: First distal phalangeal amputation. No fractures are seen.  No definite erosions identified.     LEFT foot: Great toe amputation again seen. No fractures are seen. No  erosions identified. Small plantar calcaneal spur.                                                                      IMPRESSION: No definite erosions identified in either foot to suggest  osteomyelitis.     SUKHDEV CALLE MD    Lab Results   Component Value Date    WBC 6.8 06/25/2019     Lab Results   Component Value Date    RBC 4.27 06/25/2019     Lab Results   Component Value Date    HGB 12.3 06/25/2019     Lab Results   Component Value Date    HCT 39.0 06/25/2019     No components found for: MCT  Lab Results   Component Value Date    MCV 91 06/25/2019     Lab Results   Component Value Date    MCH 28.8 06/25/2019     Lab Results   Component Value Date    MCHC 31.5 06/25/2019     Lab Results   Component Value Date    RDW 13.6 06/25/2019     Lab Results   Component Value Date     06/25/2019       All cultures:  Recent Labs   Lab 06/24/19 2002 06/24/19 1956   CULT No growth after 2 days No growth after 2 days       Hemoglobin   Date Value Ref Range Status   06/25/2019 12.3 (L) 13.3 - 17.7 g/dL Final

## 2019-06-26 NOTE — PROVIDER NOTIFICATION
Web based paged: Did not get 18 units insulin at dinner (prior shift). BS is currently 174 and pt. wants 25 units novolog for bedtime. Please address, he is upset. Thanks!

## 2019-06-26 NOTE — PLAN OF CARE
Up independently in room. Denied any pain during shift. Dressings to bilateral feet are clean, dry, and intact. BG at 0200 was 47, asymptomatic (see prior note to hospitalist).  OJ x2 returned it to 90. Vitals WNL.

## 2019-06-26 NOTE — PLAN OF CARE
RN from [id number]. Alert and oriented. Tolerating moderate carb diet, monitoring BG levels. 1700 BG was 67. Pt agreeable to hold evening insulin and check BG before bedtime. Up independently in room. Orthotics following to fit with bilateral offloading shoes. Dressings to BLE CDI, changed on day shift. Redness to L calf receding within borders. Baseline numbness in BLE. Voiding in adequate amounts. Denies pain. On IV Zosyn.

## 2019-06-26 NOTE — PLAN OF CARE
DAy RN  Augusto, CMS is at baseline (numbness in both feet) Denied pian in feet  Dressing change per order after shower  Eating and drinking well. BG 92 in am and 84 at lunch time, patient insistent to take novolog coverage like he would at home based on what he eats/not carb unit coverage, dr taylor aware.  Up with SBA protective footwear encouraged.  Redness receeding from marked borders on L shin, +2 swelling mid shin down to foot still present.

## 2019-06-26 NOTE — PROGRESS NOTES
[facility]    Medicine Progress Note - Hospitalist Service       Date of Admission:  6/24/2019  Assessment & Plan   Crispin Rojas is a 59 year old male admitted on 6/24/2019. He has a past medical history significant for hypertension, hyperlipidemia, diabetes mellitus, GERD, previous stroke, and sleep apnea.  He presented to emergency room with fever and left leg pain. He was found to have left leg cellulitis.     1.  Left leg cellulitis. Likely related to left foot diabetic ulcer. Continue IV Zosyn. Appreciate podiatry imput       2.  Bilateral diabetic foot ulcers.  Wound nurse consulted. Podiatry consulted and debridement done.  Pain medications as needed. Continue IV Zosyn as noted above.     3.  Diabetes mellitus. Continue long-acting insulin and NovoLog scheduled and sliding scale.  Hold metformin and semaglutide for now.     4.  Hypertension. Continue amlodipine and lisinopril.     5.  Hyperlipidemia. Continue atorvastatin.     Diet: Moderate Consistent CHO Diet    DVT Prophylaxis: Pneumatic Compression Devices  Mccarthy Catheter: not present  Code Status: Full Code      Disposition Plan   Expected discharge: 2 - 3 days, recommended to prior living arrangement   Entered: Paige Street MD, MD 06/26/2019, 2:45 PM     Paige Street MD, MD  Hospitalist Service  [facility]    ______________________________________________________________________    Interval History   Having only mild left leg pain.  Did not notice fevers or chills this morning.  No chest pain, shortness of breath, nausea, vomiting, or diarrhea.    Data reviewed today: I reviewed all medications, new labs and imaging results over the last 24 hours.     Physical Exam   Vital Signs: Temp: 97.3  F (36.3  C) Temp src: Oral BP: 131/73 Pulse: 62   Resp: 20 SpO2: 95 % O2 Device: None (Room air)    Weight: 0 lbs 0 oz  Gen:  NAD, A&Ox3.  Eyes:  PERRL, sclera anicteric.  OP:  MMM, no lesions.  Neck:   Supple.  CV:  Regular, no murmurs.  Lung:  CTA b/l, normal effort.  Ab:  +BS, soft.  Skin:  Warm, dry to touch.  Left leg erythema.  Ext:  No pitting edema LE b/l.      Data   Recent Labs   Lab 06/25/19  0630 06/24/19  1453   WBC 6.8 7.6   HGB 12.3* 13.0*   MCV 91 90    163    138   POTASSIUM 4.4 4.0   CHLORIDE 110* 102   CO2 27 31   BUN 14 16   CR 0.92 0.98   ANIONGAP 4 5   NARA 8.1* 8.8   * 168*

## 2019-06-27 VITALS
HEART RATE: 59 BPM | RESPIRATION RATE: 20 BRPM | TEMPERATURE: 97.4 F | OXYGEN SATURATION: 99 % | SYSTOLIC BLOOD PRESSURE: 149 MMHG | DIASTOLIC BLOOD PRESSURE: 77 MMHG

## 2019-06-27 LAB
GLUCOSE BLDC GLUCOMTR-MCNC: 88 MG/DL (ref 70–99)
GLUCOSE BLDC GLUCOMTR-MCNC: 91 MG/DL (ref 70–99)
GLUCOSE BLDC GLUCOMTR-MCNC: 93 MG/DL (ref 70–99)

## 2019-06-27 PROCEDURE — 25000128 H RX IP 250 OP 636: Performed by: INTERNAL MEDICINE

## 2019-06-27 PROCEDURE — 00000146 ZZHCL STATISTIC GLUCOSE BY METER IP

## 2019-06-27 PROCEDURE — 25000132 ZZH RX MED GY IP 250 OP 250 PS 637: Performed by: INTERNAL MEDICINE

## 2019-06-27 PROCEDURE — L3260 AMBULATORY SURGICAL BOOT EAC: HCPCS

## 2019-06-27 PROCEDURE — 99239 HOSP IP/OBS DSCHRG MGMT >30: CPT | Performed by: INTERNAL MEDICINE

## 2019-06-27 RX ADMIN — GABAPENTIN 300 MG: 300 CAPSULE ORAL at 14:18

## 2019-06-27 RX ADMIN — GABAPENTIN 300 MG: 300 CAPSULE ORAL at 07:50

## 2019-06-27 RX ADMIN — TAZOBACTAM SODIUM AND PIPERACILLIN SODIUM 3.38 G: 375; 3 INJECTION, SOLUTION INTRAVENOUS at 10:36

## 2019-06-27 RX ADMIN — TAZOBACTAM SODIUM AND PIPERACILLIN SODIUM 3.38 G: 375; 3 INJECTION, SOLUTION INTRAVENOUS at 04:55

## 2019-06-27 ASSESSMENT — ACTIVITIES OF DAILY LIVING (ADL)
ADLS_ACUITY_SCORE: 10

## 2019-06-27 NOTE — DISCHARGE SUMMARY
[facility]    Discharge Summary  Hospitalist    Date of Admission:  6/24/2019  Date of Discharge:  6/27/2019  2:46 PM  Discharging Provider: Paige Street MD  Date of Service (when I saw the patient): 06/27/19    Discharge Diagnoses      1.  Left leg cellulitis. Likely related to left foot diabetic ulcer     2.  Bilateral diabetic foot ulcers     3.  Diabetes mellitus     4.  Hypertension     5.  Hyperlipidemia    History of Present Illness   Crispin Rojas is an 59 year old male who presented with left leg pain. He was started on IV Zosyn for left leg cellulitis.     Hospital Course   Crispin Rojas is a 59 year old male admitted on 6/24/2019. He has a past medical history significant for hypertension, hyperlipidemia, diabetes mellitus, GERD, previous stroke, and sleep apnea. He presented to emergency room with fever and left leg pain. He was found to have left leg cellulitis.     1.  Left leg cellulitis. Likely related to left foot diabetic ulcer. He was treated with IV Zosyn during hospital course. He was discharged on oral Augmentin. He was advised to follow up with PCP and podiatry as outpatient     2.  Bilateral diabetic foot ulcers. [facility] consulted for wound care. Patient was put on pain medications as needed. He was treated with IV Zosyn as noted above.  Podiatry was consulted and excisional debridement was performed on right 4th toe and left 1st ray ulcer on 6/25. Wound care nurse consulted. X-ray of the right 4th toe was done and showed no evidence of osteomyelitis. Podiatry recommended outpatient follow up in the clinic. Antibiotic was changed to po Augmentin for 7 more days on discharge.  His vital signs were stable on discharge. WBC was also normal.      3.  Diabetes mellitus. Continued on long-acting insulin and NovoLog scheduled and sliding scale. Initially held metformin and semaglutide. This was restarted on discharge.     4.  Hypertension. Continued on amlodipine and  lisinopril.     5.  Hyperlipidemia. Continued on atorvastatin.     Patient discharged home with a plan to follow up as outpatient.    Significant Results and Procedures   Results for orders placed or performed during the hospital encounter of 06/24/19   XR Foot Bilateral G/E 3 Views    Narrative    XR FOOT BILATERAL G/E 3 VW 6/25/2019 8:06 PM    COMPARISON: 8/19/2016 and 11/27/2017.    HISTORY: Bilateral foot wounds at the RIGHT fourth toe and plantar  forefoot as well as the LEFT plantar first metatarsal area.    FINDINGS:  RIGHT foot: First distal phalangeal amputation. No fractures are seen.  No definite erosions identified.    LEFT foot: Great toe amputation again seen. No fractures are seen. No  erosions identified. Small plantar calcaneal spur.      Impression    IMPRESSION: No definite erosions identified in either foot to suggest  osteomyelitis.    SUKHDEV CALLE MD   XR Toe Port Right G/E 2 Views    Narrative    TOE PORTABLE RIGHT TWO OR MORE VIEWS    6/26/2019 2:53 PM     HISTORY: Ulcer dorsal right 4th toe distal interphalangeal joint.    COMPARISON: 6/25/2019      Impression    IMPRESSION: Soft tissue irregularity is present over the dorsal aspect  of the fourth toe. No evidence of underlying erosion or periosteal  reaction to suggest osteomyelitis.    WALTER MAURICIO MD         Pending Results   None    Code Status   Full Code       Primary Care Physician   Aden Lopez    Discharge Disposition   Discharged to home  Condition at discharge: Stable    Consultations This Hospital Stay   PODIATRY IP CONSULT  WOUND OSTOMY CONTINENCE NURSE  IP CONSULT  ORTHOSIS EXTREMITY LOWER REFERRAL IP CONSULT    Time Spent on this Encounter   IPaige MD, personally saw the patient today and spent greater than 30 minutes discharging this patient.    Discharge Orders      Follow Up    Follow up with [facility] in 3 weeks.     *PLEASE CALL [phone] TO  SCHEDULE*  --------------------------------------------------------------------------  DR. ROMMEL OSBORNE  [facility]DAY [facility] [place]  [facility]  [address] [facility]   [address]  [place] [address]   [place], [place] [address] [phone]    THURSDAY AM - [place]  THURSDAY PM - [place]  [address]  [address]  [place] [address]   [place], [place] [id number] [phone]    FRIDAY AM - [place]  [address]  [place], [place] [id number] [phone]  --------------------------------------------------------------------------  DR. MARIS CANDELARIA  MONDAY - [place]  WEDNESDAY (AM ONLY) - [place]  [address]    [address] [facility] [place] [id number] [phone]     THURSDAY - [place]  [address]  [place], [place] [id number] [phone]  --------------------------------------------------------------------------  DR. PATSY HOOKER  MONDAY - [place]  TUESDAY & FRIDAY AM - [place]  [address]          [address]  [place] [address]        [place] [address] [phone]           WEDNESDAY - [place]        [address] [facility] [address]        [place] [address]  [phone]  ---------------------------------------------------------------------------  DR. JASEN GARCIA  MONDAY AM - [place]  WEDNESDAY - [place]  [address] [facility]   [address] [place] [address]   [place] [id number] [phone]    TUESDAY - [place] FRIDAY PM - [place]  [address]   [address]  [place] [address]  [place] [id number] [phone]     Dressing    Discharge dressing recommendations per WOC RN.     Activity    Weigh bearing as tolerated in offloading shoes.     Reason for your hospital stay    Diabetic foot infection     Diet    Follow this diet upon discharge: Orders Placed This Encounter      Moderate Consistent CHO Diet      Discharge Medications   Discharge Medication List as of 6/27/2019  2:12 PM      START taking these medications    Details   amoxicillin-clavulanate (AUGMENTIN) 875-125 MG tablet Take 1 tablet by mouth 2 times daily for 7 days, Disp-14 tablet, R-0, E-Prescribe         CONTINUE these medications which have NOT CHANGED    Details   amLODIPine (NORVASC) 5 MG tablet Take 1 tablet (5 mg) by mouth daily, Disp-90 tablet, R-0, E-Prescribe      atorvastatin (LIPITOR) 40 MG tablet TAKE 1 TABLET BY MOUTH EVERY DAY, Disp-90 tablet, R-2, E-PrescribeDuplicate, see 3/6/19 rx sent, PLEASE UPDATE      blood glucose (NO BRAND SPECIFIED) lancets standard Use to test blood sugar 3 times daily or as directed.Disp-300 each, [id number]      blood glucose monitoring (NO BRAND SPECIFIED) meter device kit Use to test blood sugar 3 times daily or as directed.Disp-1 kit, [id number]      blood glucose monitoring (NO BRAND SPECIFIED) test strip Use to test blood sugars 3 times daily or as directed, Disp-300 strip, R-3, E-Prescribe      Cholecalciferol (VITAMIN D3 PO) Take 1,000 Units by mouth daily , Historical      CIALIS 5 MG tablet TAKE 1 TABLET BY MOUTH EVERY DAY AS NEEDED, Disp-30 tablet, R-0, E-Prescribe      Co-Enzyme Q10 100 MG CAPS Take 100 mg by mouth daily , Historical      Continuous Blood Gluc Sensor (FREESTYLE LUCERO 14 DAY SENSOR) MISC 1 each every 14 days, Disp-2 each, R-11, E-Prescribe      gabapentin (NEURONTIN) 100 MG capsule Take 300 mg by mouth 3 times daily, Historical      insulin degludec (TRESIBA FLEXTOUCH) 100 UNIT/ML pen Inject 55 Units Subcutaneous daily, Disp-60 mL, R-3, E-Prescribe      !! insulin lispro (HUMALOG PEN) 100 UNIT/ML pen Inject 35 Units Subcutaneous daily (with lunch), Historical      !! insulin lispro (HUMALOG PEN) 100 UNIT/ML pen Inject 18 Units Subcutaneous 2 times daily (before meals) Breakfast and Dinner, Historical      Insulin Pen Needle (PEN NEEDLES) 31G X 5 MM MISC 1 Device 5 times  daily , TWICE DAILY WITH LANTUS AND 3 TIMES A DAY PRN WITH NOVOLOG FLEXPEN, Disp-200 each, R-11, E-Prescribe      ketoconazole (NIZORAL) 2 % external shampoo APPLY TO THE AFFECTED AREA AND THEN WASH OFF AFTER 5 MINUTES.Disp-120 mL, [id number]      lisinopril (PRINIVIL/ZESTRIL) 20 MG tablet Take 1 tablet (20 mg) by mouth daily, Disp-90 tablet, R-0, E-PrescribeError from previous denial. Please reorder.      metFORMIN (GLUCOPHAGE-XR) 500 MG 24 hr tablet TAKE 2 TABLETS BY MOUTH TWICE DAILY WITH MEALS, Disp-360 tablet, R-3, E-Prescribe      Multiple Vitamins-Minerals (MULTIVITAMIN PO) Take 1 tablet by mouth daily , Historical      Omega-3 Fatty Acids (OMEGA-3 FISH OIL PO) Take 1 g by mouth 2 times daily (with meals) , Historical      semaglutide (OZEMPIC) 1 MG/DOSE pen INJECT 1MG UNDER SKIN ONCE WEEKLY, Disp-6 pen, R-3, E-Prescribe       !! - Potential duplicate medications found. Please discuss with provider.          Allergies   Allergies   Allergen Reactions     Niacin Swelling     SIMCOR caused edema in extremities     Simvastatin Swelling     SIMCOR caused edema in extremities     Data   Most Recent 3 CBC's:  Recent Labs   Lab Test 06/25/19  0630 06/24/19  1453 06/23/18  1005   WBC 6.8 7.6 7.1   HGB 12.3* 13.0* 13.3   MCV 91 90 88    163 139*      Most Recent 3 BMP's:  Recent Labs   Lab Test 06/25/19  0630 06/24/19  1453 01/10/19  1105    138 143   POTASSIUM 4.4 4.0 4.2   CHLORIDE 110* 102 110*   CO2 27 31 23   BUN 14 16 12   CR 0.92 0.98 0.89   ANIONGAP 4 5 10   NARA 8.1* 8.8 8.8   * 168* 100*     Most Recent 2 LFT's:  Recent Labs   Lab Test 01/10/19  1105 06/23/18  1005   AST 67* 42   ALT 82* 62   ALKPHOS 47 58   BILITOTAL 0.8 0.7     Most Recent INR's and Anticoagulation Dosing History:  Anticoagulation Dose History     Recent Dosing and Labs 8/19/2013    INR 1.04        Most Recent 3 Troponin's:  Recent Labs   Lab Test 11/22/17  0605   TROPI <0.015     Most Recent Cholesterol  Panel:  Recent Labs   Lab Test 01/10/19  1105   CHOL 96   LDL 41   HDL 37*   TRIG 89     Most Recent 6 Bacteria Isolates From Any Culture (See EPIC Reports for Culture Details):  Recent Labs   Lab Test 06/24/19 2002 06/24/19 1956 08/19/16 0925 08/19/16  0905 08/18/16 1928 08/18/16  1914   CULT No growth after 3 days No growth after 3 days No growth No growth No growth No growth     Most Recent TSH, T4 and A1c Labs:  Recent Labs   Lab Test 06/24/19  1828 12/27/17  1001   TSH  --   --  1.78   A1C 7.7*   < > 9.6*    < > = values in this interval not displayed.

## 2019-06-27 NOTE — PLAN OF CARE
DAy RN  Augusto, CMS is at baseline (numbness in both feet) Denied pain in feet  Dressing change per order after shower  Eating and drinking well. BG's in 80's to 90's meal time coverage given  Up with SBA protective footwear encouraged.  Redness receeding from marked borders on L shin, +1 swelling mid shin down to foot still present.  Patient demonstrated his own dressing changes.   Discharge instructions follow up care, foot care, diabetic management and medications were all reviewed prior to discharge today. instructed to  antibiotic at his pharmacy.

## 2019-06-27 NOTE — DISCHARGE INSTRUCTIONS
Check blood sugars before meals, bedtime and if symptoms-call primary care doctor if blood sugars are not controlled.  Call neurologist/primary care if pain is not controlled    Call primary care doctor if you have persistent loose stools    Call podiatry if wounds on either foot is worsening or not healing.  Wear protective/orthotic shoe    Wound care:   DAILY     Comments: Plan of care for wound located on bilateral feet: Daily   1. Wash feet with soap and water, rinse with wound spray   2. Sween to intact skin   3. Apply Grape amount of Iodosorb gel to piece of Adaptic and depress into wound bed   4. Cover with gauze and secure with Kerlix or Flex Net/ ACE to right side only         See cellulitis handout to check for signs and symptoms of issues that you should return for.

## 2019-06-27 NOTE — PLAN OF CARE
VSS: stable. Up independently in the room. Sleeping comfortably throughout the night. CMS: baseline numbness BLE. Drsg: intact. Denies pain. BG@0200 was 96 and 93 @ 0500. Voiding adequately. Nursing continue to monitor.

## 2019-06-27 NOTE — PLAN OF CARE
Alert and oriented. Tolerating moderate carb diet, monitoring. Up independently in room.Dressings to BLE CDI, changed on day shift. Redness to L calf receding within borders. Baseline numbness in BLE. Voiding well. Denies pain. On IV Zosyn. BG 82 AT 2114, no sliding scale given.

## 2019-06-27 NOTE — PROGRESS NOTES
Here to see patient for bilateral off loading shoes.  I discussed with patient about his activity levels and looked at wounds on right foot.   Patient said plans on working for [employer] that involves a lot of stairs.  I told him that I will follow with podiatry to get a better picture on what to provide for patient.  Gerard BALLARD.

## 2019-06-30 LAB
BACTERIA SPEC CULT: NO GROWTH
BACTERIA SPEC CULT: NO GROWTH
Lab: NORMAL
Lab: NORMAL
SPECIMEN SOURCE: NORMAL
SPECIMEN SOURCE: NORMAL

## 2019-07-07 ENCOUNTER — APPOINTMENT (OUTPATIENT)
Dept: CT IMAGING | Facility: CLINIC | Age: 59
End: 2019-07-07
Attending: EMERGENCY MEDICINE
Payer: COMMERCIAL

## 2019-07-07 ENCOUNTER — APPOINTMENT (OUTPATIENT)
Dept: ULTRASOUND IMAGING | Facility: CLINIC | Age: 59
End: 2019-07-07
Attending: EMERGENCY MEDICINE
Payer: COMMERCIAL

## 2019-07-07 ENCOUNTER — NURSE TRIAGE (OUTPATIENT)
Dept: NURSING | Facility: CLINIC | Age: 59
End: 2019-07-07

## 2019-07-07 ENCOUNTER — HOSPITAL ENCOUNTER (OUTPATIENT)
Facility: CLINIC | Age: 59
Setting detail: OBSERVATION
Discharge: HOME | End: 2019-07-07
Attending: EMERGENCY MEDICINE | Admitting: HOSPITALIST
Payer: COMMERCIAL

## 2019-07-07 VITALS
HEIGHT: 72 IN | OXYGEN SATURATION: 94 % | TEMPERATURE: 96.9 F | HEART RATE: 54 BPM | DIASTOLIC BLOOD PRESSURE: 60 MMHG | SYSTOLIC BLOOD PRESSURE: 137 MMHG | RESPIRATION RATE: 16 BRPM | WEIGHT: 243 LBS | BODY MASS INDEX: 32.91 KG/M2

## 2019-07-07 DIAGNOSIS — R10.84 ABDOMINAL PAIN, GENERALIZED: ICD-10-CM

## 2019-07-07 DIAGNOSIS — K81.0 ACUTE CHOLECYSTITIS: ICD-10-CM

## 2019-07-07 PROBLEM — K81.9 CHOLECYSTITIS: Status: ACTIVE | Noted: 2019-07-07

## 2019-07-07 LAB
ALBUMIN SERPL-MCNC: 3.9 G/DL (ref 3.4–5)
ALP SERPL-CCNC: 57 U/L (ref 40–150)
ALT SERPL W P-5'-P-CCNC: 50 U/L (ref 0–70)
ANION GAP SERPL CALCULATED.3IONS-SCNC: 7 MMOL/L (ref 3–14)
AST SERPL W P-5'-P-CCNC: 43 U/L (ref 0–45)
BASOPHILS # BLD AUTO: 0 10E9/L (ref 0–0.2)
BASOPHILS NFR BLD AUTO: 0.5 %
BILIRUB SERPL-MCNC: 0.7 MG/DL (ref 0.2–1.3)
BUN SERPL-MCNC: 20 MG/DL (ref 7–30)
CALCIUM SERPL-MCNC: 8.7 MG/DL (ref 8.5–10.1)
CHLORIDE SERPL-SCNC: 104 MMOL/L (ref 94–109)
CO2 SERPL-SCNC: 27 MMOL/L (ref 20–32)
CREAT SERPL-MCNC: 0.97 MG/DL (ref 0.66–1.25)
DIFFERENTIAL METHOD BLD: ABNORMAL
EOSINOPHIL # BLD AUTO: 0.1 10E9/L (ref 0–0.7)
EOSINOPHIL NFR BLD AUTO: 1.4 %
ERYTHROCYTE [DISTWIDTH] IN BLOOD BY AUTOMATED COUNT: 13.3 % (ref 10–15)
GFR SERPL CREATININE-BSD FRML MDRD: 86 ML/MIN/{1.73_M2}
GLUCOSE BLDC GLUCOMTR-MCNC: 126 MG/DL (ref 70–99)
GLUCOSE BLDC GLUCOMTR-MCNC: 205 MG/DL (ref 70–99)
GLUCOSE BLDC GLUCOMTR-MCNC: 48 MG/DL (ref 70–99)
GLUCOSE BLDC GLUCOMTR-MCNC: 54 MG/DL (ref 70–99)
GLUCOSE BLDC GLUCOMTR-MCNC: 83 MG/DL (ref 70–99)
GLUCOSE SERPL-MCNC: 177 MG/DL (ref 70–99)
HCT VFR BLD AUTO: 42.4 % (ref 40–53)
HGB BLD-MCNC: 13.3 G/DL (ref 13.3–17.7)
IMM GRANULOCYTES # BLD: 0 10E9/L (ref 0–0.4)
IMM GRANULOCYTES NFR BLD: 0.3 %
LIPASE SERPL-CCNC: 220 U/L (ref 73–393)
LYMPHOCYTES # BLD AUTO: 1.3 10E9/L (ref 0.8–5.3)
LYMPHOCYTES NFR BLD AUTO: 14.2 %
MCH RBC QN AUTO: 28.5 PG (ref 26.5–33)
MCHC RBC AUTO-ENTMCNC: 31.4 G/DL (ref 31.5–36.5)
MCV RBC AUTO: 91 FL (ref 78–100)
MONOCYTES # BLD AUTO: 0.4 10E9/L (ref 0–1.3)
MONOCYTES NFR BLD AUTO: 4.6 %
NEUTROPHILS # BLD AUTO: 7 10E9/L (ref 1.6–8.3)
NEUTROPHILS NFR BLD AUTO: 79 %
NRBC # BLD AUTO: 0 10*3/UL
NRBC BLD AUTO-RTO: 0 /100
PLATELET # BLD AUTO: 200 10E9/L (ref 150–450)
POTASSIUM SERPL-SCNC: 4.1 MMOL/L (ref 3.4–5.3)
PROT SERPL-MCNC: 8.7 G/DL (ref 6.8–8.8)
RBC # BLD AUTO: 4.67 10E12/L (ref 4.4–5.9)
SODIUM SERPL-SCNC: 138 MMOL/L (ref 133–144)
TROPONIN I SERPL-MCNC: <0.015 UG/L (ref 0–0.04)
WBC # BLD AUTO: 8.9 10E9/L (ref 4–11)

## 2019-07-07 PROCEDURE — 96375 TX/PRO/DX INJ NEW DRUG ADDON: CPT

## 2019-07-07 PROCEDURE — 80053 COMPREHEN METABOLIC PANEL: CPT | Performed by: EMERGENCY MEDICINE

## 2019-07-07 PROCEDURE — 85025 COMPLETE CBC W/AUTO DIFF WBC: CPT | Performed by: EMERGENCY MEDICINE

## 2019-07-07 PROCEDURE — 25800030 ZZH RX IP 258 OP 636: Performed by: HOSPITALIST

## 2019-07-07 PROCEDURE — 96365 THER/PROPH/DIAG IV INF INIT: CPT

## 2019-07-07 PROCEDURE — 83690 ASSAY OF LIPASE: CPT | Performed by: EMERGENCY MEDICINE

## 2019-07-07 PROCEDURE — 99236 HOSP IP/OBS SAME DATE HI 85: CPT | Performed by: HOSPITALIST

## 2019-07-07 PROCEDURE — 99207 ZZC APP CREDIT; MD BILLING SHARED VISIT: CPT | Performed by: PHYSICIAN ASSISTANT

## 2019-07-07 PROCEDURE — 93005 ELECTROCARDIOGRAM TRACING: CPT

## 2019-07-07 PROCEDURE — 74177 CT ABD & PELVIS W/CONTRAST: CPT

## 2019-07-07 PROCEDURE — 99207 ZZC CDG-CODE CATEGORY CHANGED: CPT | Performed by: HOSPITALIST

## 2019-07-07 PROCEDURE — 96361 HYDRATE IV INFUSION ADD-ON: CPT

## 2019-07-07 PROCEDURE — 25000125 ZZHC RX 250: Performed by: EMERGENCY MEDICINE

## 2019-07-07 PROCEDURE — 76705 ECHO EXAM OF ABDOMEN: CPT

## 2019-07-07 PROCEDURE — 99203 OFFICE O/P NEW LOW 30 MIN: CPT | Performed by: SURGERY

## 2019-07-07 PROCEDURE — 25000131 ZZH RX MED GY IP 250 OP 636 PS 637: Performed by: HOSPITALIST

## 2019-07-07 PROCEDURE — 25000128 H RX IP 250 OP 636: Performed by: EMERGENCY MEDICINE

## 2019-07-07 PROCEDURE — 00000146 ZZHCL STATISTIC GLUCOSE BY METER IP

## 2019-07-07 PROCEDURE — 99285 EMERGENCY DEPT VISIT HI MDM: CPT | Mod: 25

## 2019-07-07 PROCEDURE — 84484 ASSAY OF TROPONIN QUANT: CPT | Performed by: EMERGENCY MEDICINE

## 2019-07-07 PROCEDURE — 25000132 ZZH RX MED GY IP 250 OP 250 PS 637: Performed by: EMERGENCY MEDICINE

## 2019-07-07 PROCEDURE — G0378 HOSPITAL OBSERVATION PER HR: HCPCS

## 2019-07-07 RX ORDER — DEXTROSE MONOHYDRATE 25 G/50ML
25-50 INJECTION, SOLUTION INTRAVENOUS
Status: DISCONTINUED | OUTPATIENT
Start: 2019-07-07 | End: 2019-07-07 | Stop reason: HOSPADM

## 2019-07-07 RX ORDER — NALOXONE HYDROCHLORIDE 0.4 MG/ML
.1-.4 INJECTION, SOLUTION INTRAMUSCULAR; INTRAVENOUS; SUBCUTANEOUS
Status: DISCONTINUED | OUTPATIENT
Start: 2019-07-07 | End: 2019-07-07 | Stop reason: HOSPADM

## 2019-07-07 RX ORDER — MORPHINE SULFATE 4 MG/ML
4 INJECTION, SOLUTION INTRAMUSCULAR; INTRAVENOUS ONCE
Status: COMPLETED | OUTPATIENT
Start: 2019-07-07 | End: 2019-07-07

## 2019-07-07 RX ORDER — HYDROMORPHONE HYDROCHLORIDE 1 MG/ML
0.5 INJECTION, SOLUTION INTRAMUSCULAR; INTRAVENOUS; SUBCUTANEOUS
Status: DISCONTINUED | OUTPATIENT
Start: 2019-07-07 | End: 2019-07-07 | Stop reason: HOSPADM

## 2019-07-07 RX ORDER — KETOCONAZOLE 20 MG/ML
SHAMPOO TOPICAL DAILY PRN
COMMUNITY
End: 2021-01-07

## 2019-07-07 RX ORDER — ONDANSETRON 2 MG/ML
4 INJECTION INTRAMUSCULAR; INTRAVENOUS EVERY 6 HOURS PRN
Status: DISCONTINUED | OUTPATIENT
Start: 2019-07-07 | End: 2019-07-07 | Stop reason: HOSPADM

## 2019-07-07 RX ORDER — ONDANSETRON 4 MG/1
4 TABLET, ORALLY DISINTEGRATING ORAL EVERY 6 HOURS PRN
Status: DISCONTINUED | OUTPATIENT
Start: 2019-07-07 | End: 2019-07-07 | Stop reason: HOSPADM

## 2019-07-07 RX ORDER — PROCHLORPERAZINE MALEATE 5 MG
10 TABLET ORAL EVERY 6 HOURS PRN
Status: DISCONTINUED | OUTPATIENT
Start: 2019-07-07 | End: 2019-07-07 | Stop reason: HOSPADM

## 2019-07-07 RX ORDER — NICOTINE POLACRILEX 4 MG
15-30 LOZENGE BUCCAL
Status: DISCONTINUED | OUTPATIENT
Start: 2019-07-07 | End: 2019-07-07 | Stop reason: HOSPADM

## 2019-07-07 RX ORDER — AMPICILLIN AND SULBACTAM 2; 1 G/1; G/1
3 INJECTION, POWDER, FOR SOLUTION INTRAMUSCULAR; INTRAVENOUS EVERY 6 HOURS
Status: DISCONTINUED | OUTPATIENT
Start: 2019-07-07 | End: 2019-07-07

## 2019-07-07 RX ORDER — OXYCODONE HYDROCHLORIDE 5 MG/1
5-10 TABLET ORAL
Status: DISCONTINUED | OUTPATIENT
Start: 2019-07-07 | End: 2019-07-07 | Stop reason: HOSPADM

## 2019-07-07 RX ORDER — IOPAMIDOL 755 MG/ML
500 INJECTION, SOLUTION INTRAVASCULAR ONCE
Status: COMPLETED | OUTPATIENT
Start: 2019-07-07 | End: 2019-07-07

## 2019-07-07 RX ORDER — SODIUM CHLORIDE 9 MG/ML
INJECTION, SOLUTION INTRAVENOUS CONTINUOUS
Status: DISCONTINUED | OUTPATIENT
Start: 2019-07-07 | End: 2019-07-07 | Stop reason: HOSPADM

## 2019-07-07 RX ORDER — ACETAMINOPHEN 650 MG/1
650 SUPPOSITORY RECTAL EVERY 4 HOURS PRN
Status: DISCONTINUED | OUTPATIENT
Start: 2019-07-07 | End: 2019-07-07 | Stop reason: HOSPADM

## 2019-07-07 RX ORDER — LANOLIN ALCOHOL/MO/W.PET/CERES
3 CREAM (GRAM) TOPICAL
Status: DISCONTINUED | OUTPATIENT
Start: 2019-07-07 | End: 2019-07-07 | Stop reason: HOSPADM

## 2019-07-07 RX ORDER — AMPICILLIN AND SULBACTAM 2; 1 G/1; G/1
3 INJECTION, POWDER, FOR SOLUTION INTRAMUSCULAR; INTRAVENOUS ONCE
Status: DISCONTINUED | OUTPATIENT
Start: 2019-07-07 | End: 2019-07-07

## 2019-07-07 RX ORDER — SODIUM CHLORIDE 9 MG/ML
1000 INJECTION, SOLUTION INTRAVENOUS CONTINUOUS
Status: DISCONTINUED | OUTPATIENT
Start: 2019-07-07 | End: 2019-07-07

## 2019-07-07 RX ORDER — ONDANSETRON 2 MG/ML
4 INJECTION INTRAMUSCULAR; INTRAVENOUS ONCE
Status: COMPLETED | OUTPATIENT
Start: 2019-07-07 | End: 2019-07-07

## 2019-07-07 RX ORDER — ACETAMINOPHEN 325 MG/1
650 TABLET ORAL EVERY 4 HOURS PRN
Status: DISCONTINUED | OUTPATIENT
Start: 2019-07-07 | End: 2019-07-07 | Stop reason: HOSPADM

## 2019-07-07 RX ORDER — PROCHLORPERAZINE 25 MG
25 SUPPOSITORY, RECTAL RECTAL EVERY 12 HOURS PRN
Status: DISCONTINUED | OUTPATIENT
Start: 2019-07-07 | End: 2019-07-07 | Stop reason: HOSPADM

## 2019-07-07 RX ADMIN — ONDANSETRON HYDROCHLORIDE 4 MG: 2 INJECTION, SOLUTION INTRAMUSCULAR; INTRAVENOUS at 03:59

## 2019-07-07 RX ADMIN — SODIUM CHLORIDE 65 ML: 9 INJECTION, SOLUTION INTRAVENOUS at 04:35

## 2019-07-07 RX ADMIN — MORPHINE SULFATE 4 MG: 4 INJECTION INTRAVENOUS at 04:24

## 2019-07-07 RX ADMIN — SODIUM CHLORIDE 1000 ML: 9 INJECTION, SOLUTION INTRAVENOUS at 04:25

## 2019-07-07 RX ADMIN — AMPICILLIN SODIUM AND SULBACTAM SODIUM 3 G: 2; 1 INJECTION, POWDER, FOR SOLUTION INTRAMUSCULAR; INTRAVENOUS at 06:40

## 2019-07-07 RX ADMIN — INSULIN GLARGINE 40 UNITS: 100 INJECTION, SOLUTION SUBCUTANEOUS at 06:40

## 2019-07-07 RX ADMIN — IOPAMIDOL 100 ML: 755 INJECTION, SOLUTION INTRAVENOUS at 04:34

## 2019-07-07 RX ADMIN — LIDOCAINE HYDROCHLORIDE 30 ML: 20 SOLUTION ORAL; TOPICAL at 04:10

## 2019-07-07 RX ADMIN — SODIUM CHLORIDE: 9 INJECTION, SOLUTION INTRAVENOUS at 10:20

## 2019-07-07 ASSESSMENT — ENCOUNTER SYMPTOMS
NAUSEA: 1
ABDOMINAL PAIN: 1
CONSTIPATION: 0
SHORTNESS OF BREATH: 0
VOMITING: 1

## 2019-07-07 ASSESSMENT — MIFFLIN-ST. JEOR: SCORE: 1957.3

## 2019-07-07 NOTE — DISCHARGE SUMMARY
[facility]  Hospitalist Discharge Summary       Date of Admission:  7/7/2019  Date of Discharge:  7/7/2019  Discharging Provider: Grace Wilde PA-C      Discharge Diagnoses   Nonspecific epigastric pain, resolved  Liver cirrhosis by imaging  Diabetes mellitus type 2, uncontrolled with A1c 7.7%  Hypertension      Follow-ups Needed After Discharge   Follow-up Appointments     Follow-up and recommended labs and tests       Follow up with primary care provider, Aden Lopez, within 7   days for hospital follow- up.  No follow up labs or test are needed.           Unresulted Labs Ordered in the Past 30 Days of this Admission     No orders found from 5/8/2019 to 7/8/2019.      These results will be followed up by NA    Discharge Disposition   Discharged to home  Condition at discharge: Stable    Hospital Course   This is a 58 yo man with history of stroke, hypertension, GILA, and diabetes admitted for acute epigastric pain with nausea and nonbloody emesis. Laboratory work-up unremarkable including normal liver enzymes.  CT scan of the abdomen remarkable for slight gallbladder wall thickening and cirrhotic appearing liver.  Follow-up ultrasound demonstrated some gallbladder wall thickening but no gallstones.  Symptoms improved rapidly.  Surgery evaluated and given resolution of symptoms and no definite cholecystitis, patient given PO challenge.  No recurrence of symptoms and patient tolerating low fat diet at time of discharge.    Recommend follow-up with primary care regarding imaging findings concerning for cirrhosis.  Further work-up deferred to outpatient setting.       Consultations This Hospital Stay   SURGERY GENERAL IP CONSULT    Code Status   Full Code    Time Spent on this Encounter   I, Grace Wilde, personally saw the patient today and spent greater than 30 minutes discharging this patient.       Grace Wilde PA-C  [facility]  Hospital  ______________________________________________________________________    Physical Exam   Vital Signs: Temp: 96.9  F (36.1  C) Temp src: Oral BP: 121/67 Pulse: 60 Heart Rate: 54 Resp: 16 SpO2: 94 % O2 Device: None (Room air)    Weight: 243 lbs 0 oz     Constitutional: nontoxic, comfortable appearing man sitting up in bed  Eyes: no icterus.  Wearing glasses.   HEENT: mucous membranes moist  Respiratory: clear bilaterally  Cardiovascular: RRR  GI: normoactive bowel sounds, soft, nontender, no guarding or rebound. Obese.   Lymph/Hematologic: no bruising  Genitourinary: no catheter  Skin: warm and dry.  Healing wound on plantar surface of left foot near first metatarsal; absent great toe.   Musculoskeletal: normal muscle bulk and tone.  Psychiatric: alert, oriented, appropriate.       Primary Care Physician   Aden Lopez    Discharge Orders      Reason for your hospital stay    You were in the hospital for evaluation of abdominal pain of unclear etiology.  Please continue low fat diet and follow-up with primary care provider.     Follow-up and recommended labs and tests     Follow up with primary care provider, Aden Lopez, within 7 days for hospital follow- up.  No follow up labs or test are needed.     Activity    Your activity upon discharge: activity as tolerated     When to contact your care team    Call your primary doctor if you have any of the following: increased pain.     Full Code     Diet    Follow this diet upon discharge:   Low fat diet       Significant Results and Procedures   Most Recent 3 CBC's:  Recent Labs   Lab Test 07/07/19  0353 06/25/19  0630 06/24/19  1453   WBC 8.9 6.8 7.6   HGB 13.3 12.3* 13.0*   MCV 91 91 90    172 163     Most Recent 3 BMP's:  Recent Labs   Lab Test 07/07/19  0353 06/25/19  0630 06/24/19  1453    141 138   POTASSIUM 4.1 4.4 4.0   CHLORIDE 104 110* 102   CO2 27 27 31   BUN 20 14 16   CR 0.97 0.92 0.98   ANIONGAP 7 4 5   NARA 8.7 8.1*  8.8   * 120* 168*   ,   Results for orders placed or performed during the hospital encounter of 07/07/19   CT Abdomen Pelvis w Contrast    Narrative    CT ABDOMEN/PELVIS WITH CONTRAST  7/7/2019 4:39 AM     HISTORY: Abdominal pain.    TECHNIQUE: Volumetric acquisition through abdomen and pelvis with  IV  contrast,  100 mL Isovue-370. Radiation dose for this scan was reduced  using automated exposure control, adjustment of the mA and/or kV  according to patient size, or iterative reconstruction technique.    COMPARISON: 11/22/2017.    FINDINGS: Slight irregularity of the liver surface suggesting  cirrhosis. The gallbladder is slightly distended and appears minimally  thick-walled. Correlate clinically to exclude cholecystitis. Negative  pancreas. Spleen is mildly enlarged, measuring 15.1 cm AP. There are  some upper abdominal varices. Adrenal glands and right kidney are  negative. Small nonobstructing left renal stones measuring up to 0.3  cm in the upper left kidney. There are a few small nonspecific  gastrohepatic ligament lymph nodes, not significantly changed.    Visualized lung bases are clear.    Pelvic structures within normal limits. No bowel obstruction. No  ascites or free air. A few small and borderline prominent pelvic lymph  nodes are unchanged.      Impression    IMPRESSION:  1. Gallbladder wall may be slightly thickened. Correlate clinically to  exclude cholecystitis.  2. No other acute cause of pain identified.  3. Cirrhotic-appearing liver and enlarged spleen likely due to portal  venous hypertension.  4. Small nonobstructing left renal stones.    MARISOL FIERRO MD   Abdomen US, limited (RUQ only)    Narrative    US ABDOMEN LIMITED  7/7/2019 5:55 AM    CLINICAL INFORMATION: Abdominal pain, epigastric pain, lying  orthostatic blood pressure.    COMPARISON: CT 7/7/2019.    FINDINGS: Limited right upper quadrant ultrasound demonstrates no  gallstones. Gallbladder wall appears minimally  thickened at 0.4 cm. No  sonographic Dallas's sign; however, patient was medicated. No bile  duct dilatation. Slight surface irregularity of the  liver suggesting  cirrhosis, but better seen on the earlier CT. Pancreas is obscured  likely by gas and not diagnostically visualized. The visualized  portion of the right kidney is unremarkable. No hydronephrosis on the  right.      Impression    IMPRESSION:   1. Slight nonspecific gallbladder wall thickening. No gallstones or  sonographic Dallas's sign  2. No bile duct dilatation or other acute findings.    MARISOL FIERRO MD       Discharge Medications   Current Discharge Medication List      CONTINUE these medications which have NOT CHANGED    Details   amLODIPine (NORVASC) 5 MG tablet Take 1 tablet (5 mg) by mouth daily  Qty: 90 tablet, Refills: 0    Associated Diagnoses: Other specified transient cerebral ischemias      atorvastatin (LIPITOR) 40 MG tablet TAKE 1 TABLET BY MOUTH EVERY DAY  Qty: 90 tablet, Refills: 2    Comments: Duplicate, see 3/6/19 rx sent, PLEASE UPDATE  Associated Diagnoses: 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); Hyperlipidemia LDL goal <100      Cholecalciferol (VITAMIN D3 PO) Take 1,000 Units by mouth daily       CIALIS 5 MG tablet TAKE 1 TABLET BY MOUTH EVERY DAY AS NEEDED  Qty: 30 tablet, Refills: 0    Associated Diagnoses: Erectile dysfunction due to diseases classified elsewhere      Co-Enzyme Q10 100 MG CAPS Take 100 mg by mouth daily       gabapentin (NEURONTIN) 100 MG capsule Take 300 mg by mouth 3 times daily      insulin degludec (TRESIBA FLEXTOUCH) 100 UNIT/ML pen Inject 55 Units Subcutaneous daily  Qty: 60 mL, Refills: 3    Associated Diagnoses: Type 2 diabetes mellitus with diabetic peripheral angiopathy and gangrene, with long-term current use of insulin (H)      insulin lispro (HUMALOG PEN) 100 UNIT/ML pen Inject 18 Units Subcutaneous 3 times daily (before meals) Breakfast and Dinner       ketoconazole  (NIZORAL) 2 % external shampoo Apply topically daily as needed , apply daily and wash off after 5 min      lisinopril (PRINIVIL/ZESTRIL) 20 MG tablet Take 1 tablet (20 mg) by mouth daily  Qty: 90 tablet, Refills: 0    Comments: Error from previous denial. Please reorder.  Associated Diagnoses: Essential hypertension      metFORMIN (GLUCOPHAGE-XR) 500 MG 24 hr tablet TAKE 2 TABLETS BY MOUTH TWICE DAILY WITH MEALS  Qty: 360 tablet, Refills: 3    Associated Diagnoses: 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)      Multiple Vitamins-Minerals (MULTIVITAMIN PO) Take 1 tablet by mouth daily       Omega-3 Fatty Acids (OMEGA-3 FISH OIL PO) Take 1 g by mouth 2 times daily (with meals)       semaglutide (OZEMPIC) 1 MG/DOSE pen Inject 1 mg Subcutaneous every 7 days , on Wednesdays      blood glucose (NO BRAND SPECIFIED) lancets standard Use to test blood sugar 3 times daily or as directed.  Qty: 300 each, Refills: 3    Associated Diagnoses: Type 2 diabetes mellitus with diabetic neuropathy, with long-term current use of insulin (H)      blood glucose monitoring (NO BRAND SPECIFIED) meter device kit Use to test blood sugar 3 times daily or as directed.  Qty: 1 kit, Refills: 0    Associated Diagnoses: 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)      blood glucose monitoring (NO BRAND SPECIFIED) test strip Use to test blood sugars 3 times daily or as directed  Qty: 300 strip, Refills: 3    Associated Diagnoses: 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)      Continuous Blood Gluc Sensor (FREESTYLE LUCERO 14 DAY SENSOR) MISC 1 each every 14 days  Qty: 2 each, Refills: 11    Associated Diagnoses: Type 2 diabetes mellitus with peripheral neuropathy (H); Type 2 diabetes mellitus with diabetic peripheral angiopathy and gangrene, with long-term current use of insulin (H)      Insulin Pen Needle (PEN NEEDLES) 31G X 5 MM MISC 1 Device 5 times daily , TWICE DAILY WITH LANTUS AND 3  TIMES A DAY PRN WITH NOVOLOG FLEXPEN  Qty: 200 each, Refills: 11    Associated Diagnoses: Type 2 diabetes mellitus with peripheral neuropathy (H)           Allergies   Allergies   Allergen Reactions     Niacin Swelling     SIMCOR caused edema in extremities     Simvastatin Swelling     SIMCOR caused edema in extremities

## 2019-07-07 NOTE — PLAN OF CARE
PRIMARY DIAGNOSIS: BILIARY COLIC/UNCOMPLICATED EARLY ACUTE CHOLECYSTITIS  OUTPATIENT/OBSERVATION GOALS TO BE MET BEFORE DISCHARGE:     1. Pain status: Pain free.  2. Stable vital signs and labs (if performed) at disposition: Yes  3. Tolerating adequate PO diet: tolerated clears- will attempt low fat if no pain   4. Successful cholecystectomy or clear follow up plan with General Surgery team if immediate surgery not performed: yes- no surgery planned as of now,  5. ADLs back to baseline?  No  6. Activity and level of assistance: Up with standby assistance.  7. Barriers to discharge noted: Needs to tolerate low fat diet  Discharge Planner Nurse   Safe discharge environment identified: Yes  Barriers to discharge: No       Entered by: Aida Yee 07/07/2019 9:57 AM   VSS. Pt denies pain now, nausea resolved. BS 84 in AM, 54 for before lunch. (see progress note)  Pt missing great toes on bilateral feet. Wounds on both feet- being treated OP. Plan: tolerate low fat diet and probable discharge if tolerates     Please review provider order for any additional goals.   Nurse to notify provider when observation goals have been met and patient is ready for discharge.

## 2019-07-07 NOTE — PROGRESS NOTES
ROOM # 206-2    Living Situation (if not independent, order SW consult):  Facility name:  : [facility] [phone]    Activity level at baseline: ind  Activity level on admit: sba      Patient registered to observation; given Patient Bill of Rights; given the opportunity to ask questions about observation status and their plan of care.  Patient has been oriented to the observation room, bathroom and call light is in place.    Discussed discharge goals and expectations with patient/family.

## 2019-07-07 NOTE — ED TRIAGE NOTES
Epigastric pain started around 11:30pm with sudden onset, constant and squeezing pain. ABCs intact.

## 2019-07-07 NOTE — ED PROVIDER NOTES
History     Chief Complaint:  Abdominal Pain    HPI   Crispin Rojas is a 59 year old type II diabetic male, with history of heartburn, chronic infection, thoracic aortic aneurysm, hypertension, hyperlipidemia, liver cirrhosis, and kidney stones amongst others as noted below, who presents alone for evaluation of constant acute onset of mid-epigastric abdominal pain with associated nausea and vomiting that began at 2330 yesterday. He states he was feeling baseline at dinner and denies having new or spicy foods. He states he experienced this squeezing pain and his last episode was at 0230. Due to pain, he was prompted to present.  No chest pain.  Patient just finished augmentin for leg cellulitis several days ago.  Location of the pain is in the epigastrium.   Symptoms were associated with some nausea and nonbloody emesis.  He denied any chest pain or shortness of breath.  He continues to complain of pain in the upper portion of the abdomen.    Here in the ED, patient reports that he has not had similar pain like this in the past and denies any similarities to his heart burn. He does note that he was recently on antibiotics for an infection in his right foot and completed the course three days ago. He states he had a normal bowel movement, denying any constipation. He does not that he had dark colored urine and decreased urine output, though states this is abnormal for him. He denies any shortness of breath or chest pain.    No chest pain.  Patient was recently discharged form the hospital on 6/27 for cellulitis.    [facility]     Discharge Summary  Hospitalist     Date of Admission:  6/24/2019  Date of Discharge:  6/27/2019  2:46 PM  Discharging Provider: Paige Street MD  Date of Service (when I saw the patient): 06/27/19        Discharge Diagnoses        1.  Left leg cellulitis. Likely related to left foot diabetic ulcer     2.  Bilateral diabetic foot ulcers     3.  Diabetes  mellitus     4.  Hypertension     5.  Hyperlipidemia    Allergies:  Niacin  Simvastatin     Medications:    Norvasc  Lipitor  Vitamin D3  Cialis  Co-enzyme Q10  Neurontin  Tresiba  Humalog pen  Nizoral shampoo  Lisinopril  Metformin  Multivitamin  Omega 3 fish oil  Ozempic    Past Medical History:    Cerebral infarction  Chronic infection  Heartburn/GERD  Liver cirrhosis  Hypertension  Numbness and tingling  Obesity  GILA  Kidney stones  Type II diabetes  Right bundle branch block  Thoracic aortic aneurysm without rupture     Past Surgical History:    Amputate foot - left  Amputate toe(s)   Angiogram  Colonoscopy  Combined cystoscopy, retrograde, ureteroscopy, insert stent  Combined cystoscopy, retrogrades, ureteroscopy, laser holmium lithotripsy ureter(s), insert stent  Extracorporeal shock wave lithotripsy  Recession gastrocnemius    Family History:    Mother - arthritis, Lupus, diabetes, cerebrovascular disease, cancer  Father - diabetes  Sister - diabetes    Social History:  The patient was accompanied to the ED alone.  Smoking Status: No  Smokeless Tobacco: No  Alcohol Use: Yes  Drug Use: No   Marital Status:   [2]     Review of Systems   Respiratory: Negative for shortness of breath.    Cardiovascular: Negative for chest pain.   Gastrointestinal: Positive for abdominal pain, nausea and vomiting. Negative for constipation.   Genitourinary: Positive for decreased urine volume.        Dark colored urine   All other systems reviewed and are negative.  Patient admits to not urinating much today.  Normal BM's.  No SOB.    Physical Exam   Vitals:  Patient Vitals for the past 24 hrs:   BP Temp Temp src Pulse Heart Rate Resp SpO2   07/07/19 0330 185/82 98.8  F (37.1  C) Oral 54 54 18 98 %      Physical Exam   Abdominal:           GEN: patient appears tired  HEAD: atraumatic, normocephalic  EYES: pupils reactive , conjunctivae normal  ENT: TMs flat and white bilaterally, oropharynx normal with no erythema or  exudate, mucus membranes dry  NECK: no cervical LAD  RESPIRATORY: no tachypnea, breath sounds clear to auscultation (no rales, wheezes, rhonchi)  CVS: normal S1/S2, II/VI systolic ej murmurs, no rubs/gallops  ABDOMEN: soft, epigastric to RUQ tenderness, no masses or organomegaly, no rebound, decreased bowel sounds, no peritoneal signs, persistent RUQ tenderness later in the ED course  BACK: no costovertebral angle tenderness  EXTREMITIES: intact pulses x 4, full range of motion at joints, no edema  MUSCULOSKELETAL: no deformities  SKIN: warm and dry, no acute rashes. Amputation left greater toe (entire digit), and left great toe (partial).  Healing ulcers with no acute redness.  NEURO: GCS 15, cranial nerves intact.  Motor- moves all 4 extremities.  Overall symmetrical exam  HEME: no bruising or petechiae/contusions  LYMPH: no lymphadenopathy     Emergency Department Course     ECG:  ECG taken at 0315, ECG read at 0315 by Dr. Christie Apple MD  Sinus bradycardia  Left axis deviation  Abnormal ECG  Rate 54 bpm. DC interval 168. QRS duration 94. QT/QTc 450/426. P-R-T axes -25 -43 -16.     Imaging:  Radiology findings were communicated with the patient who voiced understanding of the findings.  CT Abdomen Pelvis w Contrast:  IMPRESSION:  1. Gallbladder wall may be slightly thickened. Correlate clinically to  exclude cholecystitis.  2. No other acute cause of pain identified.  3. Cirrhotic-appearing liver and enlarged spleen likely due to portal  venous hypertension.  4. Small nonobstructing left renal stones.  Reading per radiology.     Abdomen US, Limited (RUQ) Only:  US ABDOMEN LIMITED  7/7/2019 5:55 AM     CLINICAL INFORMATION: Abdominal pain, epigastric pain, lying  orthostatic blood pressure.     COMPARISON: CT 7/7/2019.     FINDINGS: Limited right upper quadrant ultrasound demonstrates no  gallstones. Gallbladder wall appears minimally thickened at 0.4 cm. No  sonographic Dallas's sign; however, patient was  medicated. No bile  duct dilatation. Slight surface irregularity of the  liver suggesting  cirrhosis, but better seen on the earlier CT. Pancreas is obscured  likely by gas and not diagnostically visualized. The visualized  portion of the right kidney is unremarkable. No hydronephrosis on the  right.                                                                      IMPRESSION:   1. Slight nonspecific gallbladder wall thickening. No gallstones or  sonographic Dallas's sign  2. No bile duct dilatation or other acute findings.     MARISOL FIERRO MD  Reading per radiology.     Laboratory:  Laboratory findings were communicated with the patient who voiced understanding of the findings.  CBC: AWNL (WBC 8.9, HGB 13.3, )  CMP: Glucose 177 (H) o/w WNL (Creatinine 0.97)  Lipase: 220  Troponin (Collected 0353): <0.015    UA with Microscopic: pending    Interventions:  0359 Zofran 4 mg IV  0410 GI Cocktail (Maalox/Mylanta and viscous Lidocaine), 30 mL suspension, PO    0424 Morphine 4 mg IV  0425 0.9% NaCl Bolus 1000 mL IV   Heplock  Cardiac/Sp02 monitoring  Unasyn 3g IV    Emergency Department Course:  Nursing notes and vitals reviewed.  IV was inserted and blood was drawn for laboratory testing, results above.  The patient provided a urine sample here in the emergency department. This was sent for laboratory testing, findings above.  The patient was sent for a CT Abdomen Pelvis w Contrast, Abdomen US, Limited (RUQ) Only while in the emergency department, results above.    EKG obtained in the ED, see results above.      (4754)   I performed an exam of the patient as documented above. History obtained from patient.    (0420)   Updated by RN that no relief after GI cocktail.     (0511)   Rechecked patient. Will order ultrasound.  Slight more RUQ pain.    /80   Pulse (!) 45   Temp 98.8  F (37.1  C) (Oral)   Resp 18   SpO2 96%     (0601)   I spoke with Dr. Dent of the Hospitalist service regarding patient's  presentation, findings, and plan of care.     (0603)   Rechecked patient. Discussed plan of care and patient is agreeable to admission.    I discussed the treatment plan with the patient. They expressed understanding of this plan and consented to admission. I discussed the patient with Dr. Dent, who will admit the patient to a monitored bed for further evaluation and treatment.     I personally reviewed the laboratory results with the Patient and answered all related questions prior to admission.    Updated patient, still RUQ tenderness.    Impression & Plan      Medical Decision Making:  Crispin is a pleasant 59 year old gentleman who states that he had severe epigastric pain starting last nightHe doesn't think he's had constipation and denies any fevers or chills and denies any chest pain. On examination, he had slight epigastric to right upper quadrant tenderness. An IV was placed and labs were sent. He was given a GI cocktail that did not provide any relief in his symptoms, so he was given morphine, which has provided relief and taken for CT scan. CT scan does not show any evidence of colitis or obstruction or perforation.  Labs were sent.  Lipase negative and no evidence of pancreatitis.  WBC is normal.  Chemistry panel is normal with normal LFTs.    The patient started to have more tenderness in the right upper quadrant so he will go for an ultrasound. He may have an early cholecystitis and he is a diabetic. His white cell count is normal. His troponin is normal, no sign of ischemia. He will be endorsed to the oncoming physician.     Addendum: GB wall inflammed and patient with persistent early RUQ pain.  No gallstones.  Given antibiotics.  Plan- admit with Dr Dent.  Patient updated.  Likely early cholecystitis.    Diagnosis:    ICD-10-CM    1. Abdominal pain, generalized R10.84    2. Acute cholecystitis K81.0       Disposition:   Admission    Scribe Disclosure:  Precious ANDERSON, am serving as a scribe at  3:55 AM on 7/7/2019 to document services personally performed by Christie Apple MD, based on my observations and the provider's statements to me.  7/7/2019   [facility] EMERGENCY DEPARTMENT       Christie Apple MD  07/07/19 [id number]

## 2019-07-07 NOTE — PHARMACY-ADMISSION MEDICATION HISTORY
Admission medication history interview status for this patient is complete. See [facility] admission navigator for allergy information, prior to admission medications and immunization status.     Medication history interview source(s):Patient  Medication history resources (including written lists, pill bottles, clinic record):None  [facility]    Changes made to PTA medication list:  Added:  None  Deleted:  Augmentin, Humalog (duplicate)  Changed:  Humalog 18-36-18 ----> 18u tid    Actions taken by pharmacist (provider contacted, etc):None     Additional medication history information:None    Medication reconciliation/reorder completed by provider prior to medication history? Yes    Do you take OTC medications (eg tylenol, ibuprofen, fish oil, eye/ear drops, etc)? Yes, as listed.    For patients on insulin therapy:  Yes  Lantus/levemir/NPH/Mix 70/30 dose: Yes, Tresiba 55 units daily.  Sliding scale Novolog: No      Prior to Admission medications    Medication Sig Last Dose Taking? Auth Provider   amLODIPine (NORVASC) 5 MG tablet Take 1 tablet (5 mg) by mouth daily 7/6/2019 at am Yes Aden Lopez MD   atorvastatin (LIPITOR) 40 MG tablet TAKE 1 TABLET BY MOUTH EVERY DAY 7/6/2019 at am Yes Aden Lopez MD   Cholecalciferol (VITAMIN D3 PO) Take 1,000 Units by mouth daily  7/6/2019 at am Yes Reported, Patient   CIALIS 5 MG tablet TAKE 1 TABLET BY MOUTH EVERY DAY AS NEEDED prn Yes Aden Lopez MD   Co-Enzyme Q10 100 MG CAPS Take 100 mg by mouth daily  7/6/2019 at am Yes Aden Lopez MD   gabapentin (NEURONTIN) 100 MG capsule Take 300 mg by mouth 3 times daily 7/6/2019 at am Yes Unknown, Entered By History   insulin degludec (TRESIBA FLEXTOUCH) 100 UNIT/ML pen Inject 55 Units Subcutaneous daily 7/6/2019 at am Yes Jasmin Arteaga APRN CNP   insulin lispro (HUMALOG PEN) 100 UNIT/ML pen Inject 18 Units Subcutaneous 3 times daily (before meals) Breakfast and  Dinner  7/6/2019 at   Yes Unknown, Entered By History   ketoconazole (NIZORAL) 2 % external shampoo Apply topically daily as needed , apply daily and wash off after 5 min prn Yes Unknown, Entered By History   lisinopril (PRINIVIL/ZESTRIL) 20 MG tablet Take 1 tablet (20 mg) by mouth daily 7/6/2019 at am Yes Aden Lopez MD   metFORMIN (GLUCOPHAGE-XR) 500 MG 24 hr tablet TAKE 2 TABLETS BY MOUTH TWICE DAILY WITH MEALS 7/6/2019 at am Yes Jasmin Arteaga APRN CNP   Multiple Vitamins-Minerals (MULTIVITAMIN PO) Take 1 tablet by mouth daily  7/6/2019 at am Yes Reported, Patient   Omega-3 Fatty Acids (OMEGA-3 FISH OIL PO) Take 1 g by mouth 2 times daily (with meals)  7/6/2019 at am Yes Reported, Patient   semaglutide (OZEMPIC) 1 MG/DOSE pen Inject 1 mg Subcutaneous every 7 days , on Wednesdays 7/3/2019 Yes Unknown, Entered By History   blood glucose (NO BRAND SPECIFIED) lancets standard Use to test blood sugar 3 times daily or as directed.   Jasmin Arteaga APRN CNP   blood glucose monitoring (NO BRAND SPECIFIED) meter device kit Use to test blood sugar 3 times daily or as directed.   Aden Lopez MD   blood glucose monitoring (NO BRAND SPECIFIED) test strip Use to test blood sugars 3 times daily or as directed   Jasmin Arteaga APRN CNP   Continuous Blood Gluc Sensor (FREESTYLE LUCERO 14 DAY SENSOR) MISC 1 each every 14 days   Jasmin Arteaga APRN CNP   Insulin Pen Needle (PEN NEEDLES) 31G X 5 MM MISC 1 Device 5 times daily , TWICE DAILY WITH LANTUS AND 3 TIMES A DAY PRN WITH NOVOLOG FLEXPEN   Jasmin Arteaga APRN CNP

## 2019-07-07 NOTE — CONSULTS
Chief complaint:  Upper abdominal pain    HPI:  This patient is a 59 year old diabetic male who presents with epigastric abdominal pain.  This went all the way across his abdomen, though it was a bit more on the right than the left.  Patient does not know of any significant liver disease.  He did have a recent hospitalization for cellulitis.  His symptoms have now essentially resolved.    Past Medical History:   has a past medical history of Cerebral infarction (H), Chronic infection, H/O heartburn, History of heartburn, Hypertension, Numbness and tingling, Obesity, GILA (obstructive sleep apnea) (10/22/2018), Renal stone, and Type II or unspecified type diabetes mellitus without mention of complication, not stated as uncontrolled.    Past Surgical History:  Past Surgical History:   Procedure Laterality Date     AMPUTATE FOOT Left 8/18/2016    Procedure: AMPUTATE FOOT;  Surgeon: Jack Younger DPM;  Location: [facility]     AMPUTATE TOE(S) Right 2/1/2016    Procedure: AMPUTATE TOE(S);  Surgeon: Rachelle Manriquez DPM, Pod;  Location: [facility] OR     ANGIOGRAM       COLONOSCOPY       COMBINED CYSTOSCOPY, RETROGRADES, URETEROSCOPY, INSERT STENT Left 8/21/2016    Procedure: COMBINED CYSTOSCOPY, RETROGRADES, URETEROSCOPY, INSERT STENT;  Surgeon: Artemio Valenzuela MD;  Location: [facility] OR     COMBINED CYSTOSCOPY, RETROGRADES, URETEROSCOPY, LASER HOLMIUM LITHOTRIPSY URETER(S), INSERT STENT Left 10/3/2016    Procedure: COMBINED CYSTOSCOPY, RETROGRADES, URETEROSCOPY, LASER HOLMIUM LITHOTRIPSY URETER(S), INSERT STENT;  Surgeon: Artemio Valenzuela MD;  Location: [facility] OR     EXTRACORPOREAL SHOCK WAVE LITHOTRIPSY (ESWL) Left 9/8/2016    Procedure: EXTRACORPOREAL SHOCK WAVE LITHOTRIPSY (ESWL);  Surgeon: Artemio Valenzuela MD;  Location: [facility]     RECESSION GASTROCNEMIUS Right 2/1/2016    Procedure: RECESSION GASTROCNEMIUS;  Surgeon: Rachelle Manriquez DPM, Pod;  Location: [facility] OR        Social History:  Social History      Socioeconomic History     Marital status:      Spouse name: Sydney     Number of children: 2     Years of education: Not on file     Highest education level: Not on file   Occupational History     Occupation: marketing     Employer: [employer]     Comment: [employer]   Social Needs     Financial resource strain: Not on file     Food insecurity:     Worry: Not on file     Inability: Not on file     Transportation needs:     Medical: Not on file     Non-medical: Not on file   Tobacco Use     Smoking status: Never Smoker     Smokeless tobacco: Never Used   Substance and Sexual Activity     Alcohol use: Yes     Alcohol/week: 0.0 oz     Comment: rare---red wine 2x per week     Drug use: No     Sexual activity: Yes     Partners: Female   Lifestyle     Physical activity:     Days per week: Not on file     Minutes per session: Not on file     Stress: Not on file   Relationships     Social connections:     Talks on phone: Not on file     Gets together: Not on file     Attends Yazdanism service: Not on file     Active member of club or organization: Not on file     Attends meetings of clubs or organizations: Not on file     Relationship status: Not on file     Intimate partner violence:     Fear of current or ex partner: Not on file     Emotionally abused: Not on file     Physically abused: Not on file     Forced sexual activity: Not on file   Other Topics Concern     Parent/sibling w/ CABG, MI or angioplasty before 65F 55M? No   Social History Narrative     Not on file        Family History:  Family History   Problem Relation Age of Onset     Arthritis Mother         SLE     Connective Tissue Disorder Mother         lupus     Diabetes Mother      Cerebrovascular Disease Mother      Cancer Mother      Diabetes Father      Diabetes Maternal Grandfather      Diabetes Sister        Review of Systems:  The 10 point Review of Systems is negative other than noted in the HPI and above.    Physical Exam:  General - This is  a well developed, well nourished male in no apparent distress.  HEENT - Normocephalic, atraumatic.  No scleral icterus.  Neck - supple without masses  Lungs - clear to ascultation.    Heart - Regular rate & rhythm without murmur  Abdomen:   soft, non-distended with minimal tenderness noted in the epigastric region. no masses palpated. normal bowel sounds.  Neurologic - nonfocal    Relevant labs:  Normal liver function tests and lipase.  White blood cell count 8900.    Imaging:  CT and ultrasound showed questionable mild thickening of the gallbladder wall which is felt to be nonspecific.  No stones were seen.  No biliary ductal dilatation noted    Assessment and Plan:  This is a patient with epigastric pain of uncertain etiology.  He does have some nonspecific borderline gallbladder wall thickening, but this certainly is not clearly cholecystitis.  He has had significant resolution of his symptoms, I think would be reasonable to try feeding him and see how he does clinically.  I see no clear indication for cholecystectomy now, or in the future.  If he does have continued or recurrent symptoms, we may need to revisit that.      Soy Powell MD  [facility]

## 2019-07-07 NOTE — ED NOTES
[facility]  ED Nurse Handoff Report    Crispin Rojas is a 59 year old male presents with mid gastric abd pain that began shortly after midnight.  US and CT show thickened gallbladder wall, likely choley.  Here for further sx control and surg consult.  Pt is alert and independent in ambulation.    ED Chief complaint: Abdominal Pain  . ED Diagnosis:   Final diagnoses:   Abdominal pain, generalized   Acute cholecystitis     Allergies:   Allergies   Allergen Reactions     Niacin Swelling     SIMCOR caused edema in extremities     Simvastatin Swelling     SIMCOR caused edema in extremities       Code Status: Full Code  Activity level - Baseline/Home:  Independent. Activity Level - Current:   Independent. Lift room needed: No. Bariatric: No   Needed: No   Isolation: No. Infection: Not Applicable.     Vital Signs:   Vitals:    07/07/19 0459 07/07/19 0500 07/07/19 0516 07/07/19 0517   BP: 138/80      Pulse: (!) 45      Resp:       Temp:       TempSrc:       SpO2:  100% 96% 96%       Cardiac Rhythm:  ,      Pain level: 0-10 Pain Scale: 10  Patient confused: No. Patient Falls Risk: Yes.   Elimination Status: Has voided   Patient Report - Initial Complaint: abd pain. Focused Assessment:  Mid gastric abd pain, n/v  Tests Performed: blood, CT, US. Abnormal Results: CT/US show thickened gall bladder, likely choely.   Treatments provided: fluids, antiemetics, morphine  Family Comments: n/a  OBS brochure/video discussed/provided to patient:  Yes  ED Medications:   Medications   0.9% sodium chloride BOLUS (1,000 mLs Intravenous New Bag 7/7/19 2918)     Followed by   sodium chloride 0.9% infusion (has no administration in time range)   ampicillin-sulbactam (UNASYN) 3 g vial to attach to  mL bag (has no administration in time range)   ondansetron (ZOFRAN) injection 4 mg (4 mg Intravenous Given 7/7/19 1378)   lidocaine HCl (XYLOCAINE) 2 % 15 mL, alum & mag hydroxide-simethicone (MYLANTA ES/MAALOX  ES)  15 mL GI Cocktail (30 mLs Oral Given 7/7/19 2440)   morphine (PF) injection 4 mg (4 mg Intravenous Given 7/7/19 3421)   0.9% sodium chloride BOLUS (0 mLs Intravenous Stopped 7/7/19 1966)   iopamidol (ISOVUE-370) solution 500 mL (100 mLs Intravenous Given 7/7/19 3755)     Drips infusing:  No  For the majority of the shift, the patient's behavior Green. Interventions performed were n/a.     Severe Sepsis OR Septic Shock Diagnosis Present: No      ED Nurse Name/Phone Number: Delvin Mouraver,   6:06 AM    RECEIVING UNIT ED HANDOFF REVIEW    Above ED Nurse Handoff Report was reviewed: Yes  Reviewed by: Jose Elias Mireles on July 7, 2019 at 6:31 AM

## 2019-07-07 NOTE — PLAN OF CARE
Patient's After Visit Summary was reviewed with patient.   Patient verbalized understanding of After Visit Summary, recommended follow up and was given an opportunity to ask questions.   Discharge medications sent home with patient/family: No, Not applicable   Discharged with self.     Declined wheelchair ride to exit with staff.       Patient Not Applicable the Vehicle Transfer Screen - Patient declined    Passed:  NA    Failed:  Care Transitions Notified Not applicable     Plan for discharge: Home with family or friend    Name and relation of person helping if known: Unknown    Person completing Vehicle Transfer Screen: Bhavna Camacho    OBSERVATION patient END time: 3:40 PM

## 2019-07-07 NOTE — PROGRESS NOTES
BS 54, pt asymptotic. Pt given sherry crackers.. Will recheck in 15 min.    Update: Update BS 48 after sherry crackers- pt still asymptotic. More sherry crackers and skim milk provided.    Update: pt food arrived after cosuming skim milk. PA aware and orders for additional BS check.

## 2019-07-07 NOTE — PLAN OF CARE
PRIMARY DIAGNOSIS: BILIARY COLIC/UNCOMPLICATED EARLY ACUTE CHOLECYSTITIS  OUTPATIENT/OBSERVATION GOALS TO BE MET BEFORE DISCHARGE:    1. Pain status: Pain free.  2. Stable vital signs and labs (if performed) at disposition: Yes  3. Tolerating adequate PO diet: NPO ordered   4. Successful cholecystectomy or clear follow up plan with General Surgery team if immediate surgery not performed No  5. ADLs back to baseline?  No  6. Activity and level of assistance: Up with standby assistance.  7. Barriers to discharge noted: needs surgery consult.    Discharge Planner Nurse   Safe discharge environment identified: Yes  Barriers to discharge: No       Entered by: Aida Yee 07/07/2019 9:57 AM   VSS. Pt denies pain now, nausea resolved. BS 84. Pt missing great toes on bilateral feet. Wounds on both feet- being treated OP. Plan: Surgery consult  Please review provider order for any additional goals.   Nurse to notify provider when observation goals have been met and patient is ready for discharge.

## 2019-07-07 NOTE — TELEPHONE ENCOUNTER
"Caller states he is having excruciating upper abdominal pain that he rates higher than 10/10. Caller states he has vomiting 4 times within the last 4 hours. Unable to take temperature due to no thermometer. Triage guidelines recommend to go to ED, caller verbalized and understands directives.    Reason for Disposition    [1] Pain lasts > 10 minutes AND [2] age > 50    Additional Information    Pain is mainly in upper abdomen  (if needed ask: \"is it mainly above the belly button?\")    Negative: Severe difficulty breathing (e.g., struggling for each breath, speaks in single words)    Negative: Shock suspected (e.g., cold/pale/clammy skin, too weak to stand, low BP, rapid pulse)    Negative: Difficult to awaken or acting confused (e.g., disoriented, slurred speech)    Negative: Passed out (i.e., lost consciousness, collapsed and was not responding)    Negative: Visible sweat on face or sweat dripping down face    Negative: Sounds like a life-threatening emergency to the triager    Negative: Followed an abdomen (stomach) injury    Negative: Chest pain    Negative: [1] SEVERE pain (e.g., excruciating) AND [2] present > 1 hour    Protocols used: ABDOMINAL PAIN - UPPER-A-AH, ABDOMINAL PAIN - MALE-A-AH      "

## 2019-07-07 NOTE — H&P
[facility]  Hospitalist H&P    Name: Crispin Rojas      MRN: [id number]  YOB: 1960    Age: 59 year old  Date of admission: 7/7/2019  Primary care provider: Aden Lopez            Assessment and Plan:   Crispin Rojas is a 59 year old male with a history of stroke, hypertension, diabetes mellitus, obstructive sleep apnea, and recent left leg cellulitis who presents with epigastric abdominal pain.  Patient indicates that his symptoms started around midnight.  This was associated with some nausea and nonbloody emesis.  He denied any chest pain or shortness of breath.  He continues to complain of pain in the upper portion of the abdomen.  He indicates that he had pain in the right upper quadrant when he was receiving his ultrasound.  Laboratory work was unremarkable.  CT shows gallbladder wall slight thickening which could represent early cholecystitis.  No gallstones were seen and there was only small nonobstructing left renal stones.  There was a cirrhotic appearing liver seen as well.  Ultrasound shows again some gallbladder wall thickening but no sonographic Dallas sign.  Patient will be admitted to observation for management of his pain and concern for early acute cholecystitis.    1. Abdominal pain, possible early cholecystitis: We will request general surgery consultation.  Place patient n.p.o. and start normal saline.  Continue IV Unasyn until surgery has seen the patient.  Could also have a component of gastritis so start IV Protonix.  Given somewhat equivocal imaging findings might benefit from a HIDA scan.  2. Diabetes mellitus: He tells me he takes 55 units of Lantus every morning and 18 units of NovoLog with meals.  Given that he will be n.p.o. we will give him 40 units of Lantus now and start a high sliding scale insulin.  Hold metformin  3. Hypertension: Please resume his amlodipine and lisinopril following pharmacy reconciliation.  4. Recent left lower  extremity cellulitis: Has completed Augmentin.  His leg looks well-healed.    Code status: Full.  Admit to observation status.  Prophylaxis: None.  Disposition: Likely home later today.            Chief Complaint:   Abdominal pain.         History of Present Illness:   Crispin Rojas is a 59 year old male who presents with abdominal pain.  History was obtained from my discussion with the patient at the bedside.  I also discussed the case with the ED provider.  The electronic medical record was also reviewed.    59 year old male with a history of stroke, hypertension, diabetes mellitus, obstructive sleep apnea, and recent left leg cellulitis who presents with epigastric abdominal pain.  Patient indicates that his symptoms started around midnight.  This was associated with some nausea and nonbloody emesis.  He denied any chest pain or shortness of breath.  He continues to complain of pain in the upper portion of the abdomen.  He indicates that he had pain in the right upper quadrant when he was receiving his ultrasound.  Laboratory work was unremarkable.  CT shows gallbladder wall slight thickening which could represent early cholecystitis.  No gallstones were seen and there was only small nonobstructing left renal stones.  There was a cirrhotic appearing liver seen as well.  Ultrasound shows again some gallbladder wall thickening but no sonographic Dallas sign.  Patient will be admitted to observation for management of his pain and concern for early acute cholecystitis.            Past Medical History:     Past Medical History:   Diagnosis Date     Cerebral infarction (H)      Chronic infection      H/O heartburn      History of heartburn      Hypertension      Numbness and tingling      Obesity      GILA (obstructive sleep apnea) 10/22/2018     Renal stone      Type II or unspecified type diabetes mellitus without mention of complication, not stated as uncontrolled              Past Surgical History:     Past  Surgical History:   Procedure Laterality Date     AMPUTATE FOOT Left 8/18/2016    Procedure: AMPUTATE FOOT;  Surgeon: Jack Younger DPM;  Location: [facility]     AMPUTATE TOE(S) Right 2/1/2016    Procedure: AMPUTATE TOE(S);  Surgeon: Rachelle Manriquez DPM, Pod;  Location: [facility] OR     ANGIOGRAM       COLONOSCOPY       COMBINED CYSTOSCOPY, RETROGRADES, URETEROSCOPY, INSERT STENT Left 8/21/2016    Procedure: COMBINED CYSTOSCOPY, RETROGRADES, URETEROSCOPY, INSERT STENT;  Surgeon: Artemio Valenzuela MD;  Location: [facility] OR     COMBINED CYSTOSCOPY, RETROGRADES, URETEROSCOPY, LASER HOLMIUM LITHOTRIPSY URETER(S), INSERT STENT Left 10/3/2016    Procedure: COMBINED CYSTOSCOPY, RETROGRADES, URETEROSCOPY, LASER HOLMIUM LITHOTRIPSY URETER(S), INSERT STENT;  Surgeon: Artemio Valenzuela MD;  Location: [facility] OR     EXTRACORPOREAL SHOCK WAVE LITHOTRIPSY (ESWL) Left 9/8/2016    Procedure: EXTRACORPOREAL SHOCK WAVE LITHOTRIPSY (ESWL);  Surgeon: Artemio Valenzuela MD;  Location: [facility] OR     RECESSION GASTROCNEMIUS Right 2/1/2016    Procedure: RECESSION GASTROCNEMIUS;  Surgeon: Rachelle Manriquez DPM, Pod;  Location: [facility] OR             Social History:     Social History     Tobacco Use     Smoking status: Never Smoker     Smokeless tobacco: Never Used   Substance Use Topics     Alcohol use: Yes     Alcohol/week: 0.0 oz     Comment: rare---red wine 2x per week             Family History:   The family history was fully reviewed and non-contributory in this case.         Allergies:     Allergies   Allergen Reactions     Niacin Swelling     SIMCOR caused edema in extremities     Simvastatin Swelling     SIMCOR caused edema in extremities             Medications:     Prior to Admission medications    Medication Sig Last Dose Taking? Auth Provider   amLODIPine (NORVASC) 5 MG tablet Take 1 tablet (5 mg) by mouth daily   Aden Lopez MD   atorvastatin (LIPITOR) 40 MG tablet TAKE 1 TABLET BY MOUTH EVERY DAY   Aden Lopez,  MD   blood glucose (NO BRAND SPECIFIED) lancets standard Use to test blood sugar 3 times daily or as directed.   Jasmin Arteaga APRN CNP   blood glucose monitoring (NO BRAND SPECIFIED) meter device kit Use to test blood sugar 3 times daily or as directed.   Aden Lopez MD   blood glucose monitoring (NO BRAND SPECIFIED) test strip Use to test blood sugars 3 times daily or as directed   Jasmin Arteaga APRN CNP   Cholecalciferol (VITAMIN D3 PO) Take 1,000 Units by mouth daily    Reported, Patient   CIALIS 5 MG tablet TAKE 1 TABLET BY MOUTH EVERY DAY AS NEEDED   Aden Lopez MD   Co-Enzyme Q10 100 MG CAPS Take 100 mg by mouth daily    Aden Lopez MD   Continuous Blood Gluc Sensor (FREESTYLE LUCERO 14 DAY SENSOR) MISC 1 each every 14 days   Jasmin Arteaga APRN CNP   gabapentin (NEURONTIN) 100 MG capsule Take 300 mg by mouth 3 times daily   Unknown, Entered By History   insulin degludec (TRESIBA FLEXTOUCH) 100 UNIT/ML pen Inject 55 Units Subcutaneous daily   Jasmin Arteaga APRN CNP   insulin lispro (HUMALOG PEN) 100 UNIT/ML pen Inject 35 Units Subcutaneous daily (with lunch)   Unknown, Entered By History   insulin lispro (HUMALOG PEN) 100 UNIT/ML pen Inject 18 Units Subcutaneous 2 times daily (before meals) Breakfast and Dinner   Unknown, Entered By History   Insulin Pen Needle (PEN NEEDLES) 31G X 5 MM MISC 1 Device 5 times daily , TWICE DAILY WITH LANTUS AND 3 TIMES A DAY PRN WITH NOVOLOG FLEXPEN   Jasmin Arteaga APRN CNP   ketoconazole (NIZORAL) 2 % external shampoo APPLY TO THE AFFECTED AREA AND THEN WASH OFF AFTER 5 MINUTES.  Patient taking differently: APPLY DAILY TO THE AFFECTED AREA AND THEN WASH OFF AFTER 5 MINUTES.   Aden Lopez MD   lisinopril (PRINIVIL/ZESTRIL) 20 MG tablet Take 1 tablet (20 mg) by mouth daily   Aden Lopez MD   metFORMIN (GLUCOPHAGE-XR) 500 MG 24 hr tablet TAKE 2 TABLETS BY MOUTH TWICE DAILY WITH MEALS   Chandler  SARAH Whittington CNP   Multiple Vitamins-Minerals (MULTIVITAMIN PO) Take 1 tablet by mouth daily    Reported, Patient   Omega-3 Fatty Acids (OMEGA-3 FISH OIL PO) Take 1 g by mouth 2 times daily (with meals)    Reported, Patient   semaglutide (OZEMPIC) 1 MG/DOSE pen INJECT 1MG UNDER SKIN ONCE WEEKLY   aJsmin Arteaga, SARAH CNP             Review of Systems:   A Comprehensive greater than 10 system review of systems was carried out.  Pertinent positives and negatives are noted above.  Otherwise negative for contributory information.           Physical Exam:   Blood pressure 138/80, pulse (!) 45, temperature 98.8  F (37.1  C), temperature source Oral, resp. rate 18, SpO2 96 %.  Wt Readings from Last 1 Encounters:   04/29/19 110.9 kg (244 lb 9.6 oz)     Exam:  GENERAL: No apparent distress. Awake, alert, and fully oriented.  HEENT: Normocephalic, atraumatic. Extraocular movements intact.  CARDIOVASCULAR: Regular rate and rhythm without murmurs or rubs. No S3.  PULMONARY: Clear to auscultation bilaterally.  ABDOMINAL: Soft, non-tender, non-distended. Bowel sounds normoactive.   EXTREMITIES: No cyanosis or clubbing. No appreciable edema. Multiple toe amputations.  NEUROLOGICAL: CN 2-12 grossly intact, no focal neurological deficits.  DERMATOLOGICAL: No rash, ulcer, bruising, nor jaundice.          Data:   EKG:  Personally reviewed.   Sinus bradycardia  Left axis deviation  Abnormal ECG  Rate 54 bpm. NH interval 168. QRS duration 94. QT/QTc 450/426. P-R-T axes -25 -43 -16.     Laboratory:  Recent Labs   Lab 07/07/19 0353   WBC 8.9   HGB 13.3   HCT 42.4   MCV 91        Recent Labs   Lab 07/07/19 0353      POTASSIUM 4.1   CHLORIDE 104   CO2 27   ANIONGAP 7   *   BUN 20   CR 0.97   GFRESTIMATED 86   GFRESTBLACK >90   NARA 8.7     Recent Labs   Lab 07/07/19 0353   AST 43   ALT 50   ALKPHOS 57   BILITOTAL 0.7     Recent Labs   Lab 07/07/19 0353   LIPASE 220     Recent Labs   Lab 07/07/19 0353   TROPI  <0.015     No results for input(s): CULT in the last 168 hours.    Imaging:  Recent Results (from the past 24 hour(s))   CT Abdomen Pelvis w Contrast    Narrative    CT ABDOMEN/PELVIS WITH CONTRAST  7/7/2019 4:39 AM     HISTORY: Abdominal pain.    TECHNIQUE: Volumetric acquisition through abdomen and pelvis with  IV  contrast,  100 mL Isovue-370. Radiation dose for this scan was reduced  using automated exposure control, adjustment of the mA and/or kV  according to patient size, or iterative reconstruction technique.    COMPARISON: 11/22/2017.    FINDINGS: Slight irregularity of the liver surface suggesting  cirrhosis. The gallbladder is slightly distended and appears minimally  thick-walled. Correlate clinically to exclude cholecystitis. Negative  pancreas. Spleen is mildly enlarged, measuring 15.1 cm AP. There are  some upper abdominal varices. Adrenal glands and right kidney are  negative. Small nonobstructing left renal stones measuring up to 0.3  cm in the upper left kidney. There are a few small nonspecific  gastrohepatic ligament lymph nodes, not significantly changed.    Visualized lung bases are clear.    Pelvic structures within normal limits. No bowel obstruction. No  ascites or free air. A few small and borderline prominent pelvic lymph  nodes are unchanged.      Impression    IMPRESSION:  1. Gallbladder wall may be slightly thickened. Correlate clinically to  exclude cholecystitis.  2. No other acute cause of pain identified.  3. Cirrhotic-appearing liver and enlarged spleen likely due to portal  venous hypertension.  4. Small nonobstructing left renal stones.   Abdomen US, limited (RUQ only)    Narrative    US ABDOMEN LIMITED  7/7/2019 5:55 AM    CLINICAL INFORMATION: Abdominal pain, epigastric pain, lying  orthostatic blood pressure.    COMPARISON: CT 7/7/2019.    FINDINGS: Limited right upper quadrant ultrasound demonstrates no  gallstones. Gallbladder wall appears minimally thickened at 0.4 cm.  No  sonographic Dallas's sign; however, patient was medicated. No bile  duct dilatation. Slight surface irregularity of the  liver suggesting  cirrhosis, but better seen on the earlier CT. Pancreas is obscured  likely by gas and not diagnostically visualized. The visualized  portion of the right kidney is unremarkable. No hydronephrosis on the  right.      Impression    IMPRESSION:   1. Slight nonspecific gallbladder wall thickening. No gallstones or  sonographic Dallas's sign  2. No bile duct dilatation or other acute findings.       Nathan Dent DO MPH  [facility] Hospitalist  [address] [place] [address]  Pager: [phone]  07/07/2019

## 2019-07-08 LAB — INTERPRETATION ECG - MUSE: NORMAL

## 2019-07-09 ENCOUNTER — TELEPHONE (OUTPATIENT)
Dept: FAMILY MEDICINE | Facility: CLINIC | Age: 59
End: 2019-07-09

## 2019-07-09 NOTE — TELEPHONE ENCOUNTER
Follow-up and recommended labs and tests      Follow up with primary care provider, Aden Lopez, within 7 days for hospital follow- up.  No follow up labs or test are needed.       ED / Discharge Outreach Protocol    Patient Contact    Attempt # 1    Was call answered?  No.  Left message on voicemail with information to call me back if questions or concerns, make f/u appointment with Aden Lopez MD .  June Thomas RN, BSN  Message handled by Nurse Triage.

## 2019-07-09 NOTE — TELEPHONE ENCOUNTER
patient scheduled for September-  Are you willing to double book patient for him to get in sooner?

## 2019-07-10 ENCOUNTER — OFFICE VISIT (OUTPATIENT)
Dept: ENDOCRINOLOGY | Facility: CLINIC | Age: 59
End: 2019-07-10
Payer: COMMERCIAL

## 2019-07-10 ENCOUNTER — OFFICE VISIT (OUTPATIENT)
Dept: FAMILY MEDICINE | Facility: CLINIC | Age: 59
End: 2019-07-10
Payer: COMMERCIAL

## 2019-07-10 VITALS
BODY MASS INDEX: 33.08 KG/M2 | WEIGHT: 240.5 LBS | HEART RATE: 54 BPM | RESPIRATION RATE: 14 BRPM | OXYGEN SATURATION: 96 % | DIASTOLIC BLOOD PRESSURE: 80 MMHG | TEMPERATURE: 97.6 F | SYSTOLIC BLOOD PRESSURE: 160 MMHG

## 2019-07-10 VITALS
HEART RATE: 57 BPM | BODY MASS INDEX: 33.12 KG/M2 | RESPIRATION RATE: 12 BRPM | DIASTOLIC BLOOD PRESSURE: 80 MMHG | WEIGHT: 240.8 LBS | TEMPERATURE: 97.8 F | SYSTOLIC BLOOD PRESSURE: 150 MMHG

## 2019-07-10 DIAGNOSIS — Z79.4 TYPE 2 DIABETES MELLITUS WITH DIABETIC POLYNEUROPATHY, WITH LONG-TERM CURRENT USE OF INSULIN (H): ICD-10-CM

## 2019-07-10 DIAGNOSIS — I10 BENIGN ESSENTIAL HYPERTENSION: Primary | ICD-10-CM

## 2019-07-10 DIAGNOSIS — E11.52 TYPE 2 DIABETES MELLITUS WITH DIABETIC PERIPHERAL ANGIOPATHY AND GANGRENE, WITH LONG-TERM CURRENT USE OF INSULIN (H): ICD-10-CM

## 2019-07-10 DIAGNOSIS — E66.01 MORBID OBESITY, UNSPECIFIED OBESITY TYPE (H): Primary | ICD-10-CM

## 2019-07-10 DIAGNOSIS — E66.01 MORBID OBESITY (H): ICD-10-CM

## 2019-07-10 DIAGNOSIS — Z79.4 TYPE 2 DIABETES MELLITUS WITH DIABETIC PERIPHERAL ANGIOPATHY AND GANGRENE, WITH LONG-TERM CURRENT USE OF INSULIN (H): ICD-10-CM

## 2019-07-10 DIAGNOSIS — E11.42 TYPE 2 DIABETES MELLITUS WITH DIABETIC POLYNEUROPATHY, WITH LONG-TERM CURRENT USE OF INSULIN (H): ICD-10-CM

## 2019-07-10 DIAGNOSIS — M86.08 ACUTE HEMATOGENOUS OSTEOMYELITIS OF OTHER SITE (H): ICD-10-CM

## 2019-07-10 PROCEDURE — 99214 OFFICE O/P EST MOD 30 MIN: CPT | Performed by: CLINICAL NURSE SPECIALIST

## 2019-07-10 PROCEDURE — 99214 OFFICE O/P EST MOD 30 MIN: CPT | Performed by: FAMILY MEDICINE

## 2019-07-10 RX ORDER — LISINOPRIL 40 MG/1
40 TABLET ORAL DAILY
Qty: 90 TABLET | Refills: 1 | Status: SHIPPED | OUTPATIENT
Start: 2019-07-10 | End: 2020-04-13

## 2019-07-10 NOTE — PROGRESS NOTES
Name: Crispin Rojas  Seen today for Diabetes (Last seen 3/29/2019).  HPI:  Crispin Rojas is a 59 year old male who presents for the management of:    1. Type 2 DM:  Originally diagnosed at the age of: 1997.  Insulin treated 15+ years.    Current Regimen: Metformin 1000 mg bid, Ozempic 1 mg weekly , Tresiba 55 units q am Humalog 18-27 units (full meal) or 17 units for a snack tid + 2:50>150.  Takes 1/2 dose of Tresiba if going to be very active.    Started Bydureon 5/2018 - tolerated well.    Changed to Ozempic 6/2018 - feels Ozempic is working better  Using personal CGM - Corwin  Hospitalized 6/2019 for cellulitis and bilateral diabetic foot ulcers.  Healed now.    BS checks: 3-5 times per day, + Corwin  Meter Download:   Corwin CGM download: Average glucose 140; 26% above 180, 61% in target range .    Working with diabetes ed - last seen by June Walker 1/14/2019 - discussed insulin pump therapy , not interested in pump therapy at this time.    Taking gabapenin per neurology for ear/nerve problem.    Complications:   Diabetes Complications  Description / Detail    Diabetic Retinopathy   + diabetic retinopathy, last exam 6/2019, also sees  retinal specialist annually   CAD / PAD  No   Neuropathy   Yes, sees Dr. Molina - no open foot lesions at this time.   Nephropathy / Microalbuminuria  Yes, elevated microalbumin   Gastroparesis  No   Hypoglycemia Unawareness  No       2. Hypertension: Blood Pressure today:   BP Readings from Last 3 Encounters:   07/10/19 160/80   07/10/19 150/80   07/07/19 137/60   .  Blood pressure medications include Amlodipine 5 mg qd, lisinopril 20 mg qd.   3. Hyperlipidemia: Takes Atorvastatin 40 mg qd for lipid control.    4. Prevention:  Flu Shot- annually  Pneumovax- 11/2015  Opthalmology-annaully  Dental-recommend semiannually  ASA-  Smoking-   PMH/PSH:  Past Medical History:   Diagnosis Date     Cerebral infarction (H)      Chronic infection      H/O heartburn      History of  heartburn      Hypertension      Numbness and tingling      Obesity      GILA (obstructive sleep apnea) 10/22/2018     Renal stone      Type II or unspecified type diabetes mellitus without mention of complication, not stated as uncontrolled      Past Surgical History:   Procedure Laterality Date     AMPUTATE FOOT Left 8/18/2016    Procedure: AMPUTATE FOOT;  Surgeon: Jack Younger DPM;  Location: [facility] OR     AMPUTATE TOE(S) Right 2/1/2016    Procedure: AMPUTATE TOE(S);  Surgeon: Rachelle Manriquez DPM, Pod;  Location: [facility] OR     ANGIOGRAM       COLONOSCOPY       COMBINED CYSTOSCOPY, RETROGRADES, URETEROSCOPY, INSERT STENT Left 8/21/2016    Procedure: COMBINED CYSTOSCOPY, RETROGRADES, URETEROSCOPY, INSERT STENT;  Surgeon: Artemio Valenzuela MD;  Location: [facility] OR     COMBINED CYSTOSCOPY, RETROGRADES, URETEROSCOPY, LASER HOLMIUM LITHOTRIPSY URETER(S), INSERT STENT Left 10/3/2016    Procedure: COMBINED CYSTOSCOPY, RETROGRADES, URETEROSCOPY, LASER HOLMIUM LITHOTRIPSY URETER(S), INSERT STENT;  Surgeon: Artemio Valenzuela MD;  Location: [facility]     EXTRACORPOREAL SHOCK WAVE LITHOTRIPSY (ESWL) Left 9/8/2016    Procedure: EXTRACORPOREAL SHOCK WAVE LITHOTRIPSY (ESWL);  Surgeon: Artemio Valenzuela MD;  Location: [facility]     RECESSION GASTROCNEMIUS Right 2/1/2016    Procedure: RECESSION GASTROCNEMIUS;  Surgeon: Rachelle Manriquez DPM, Pod;  Location: [facility] OR     Family Hx:  Family History   Problem Relation Age of Onset     Arthritis Mother         SLE     Connective Tissue Disorder Mother         lupus     Diabetes Mother      Cerebrovascular Disease Mother      Cancer Mother      Diabetes Father      Diabetes Maternal Grandfather      Diabetes Sister      Thyroid disease:          DM2: Yes: Strong family history on mothers side - mom, 7 maternal uncles, two maternal aunts, + MGF.         Autoimmune: DM1, SLE, RA, Vitiligo Yes: Lupus    Social Hx:  Social History     Socioeconomic History     Marital status:       Spouse name: Sydney     Number of children: 2     Years of education: Not on file     Highest education level: Not on file   Occupational History     Occupation: marketing     Employer: [employer]     Comment: [employer]   Social Needs     Financial resource strain: Not on file     Food insecurity:     Worry: Not on file     Inability: Not on file     Transportation needs:     Medical: Not on file     Non-medical: Not on file   Tobacco Use     Smoking status: Never Smoker     Smokeless tobacco: Never Used   Substance and Sexual Activity     Alcohol use: Yes     Alcohol/week: 0.0 oz     Comment: rare---red wine 2x per week     Drug use: No     Sexual activity: Yes     Partners: Female   Lifestyle     Physical activity:     Days per week: Not on file     Minutes per session: Not on file     Stress: Not on file   Relationships     Social connections:     Talks on phone: Not on file     Gets together: Not on file     Attends Muslim service: Not on file     Active member of club or organization: Not on file     Attends meetings of clubs or organizations: Not on file     Relationship status: Not on file     Intimate partner violence:     Fear of current or ex partner: Not on file     Emotionally abused: Not on file     Physically abused: Not on file     Forced sexual activity: Not on file   Other Topics Concern     Parent/sibling w/ CABG, MI or angioplasty before 65F 55M? No   Social History Narrative     Not on file          MEDICATIONS:  has a current medication list which includes the following prescription(s): insulin degludec, insulin lispro, semaglutide, amlodipine, atorvastatin, blood glucose, blood glucose monitoring, blood glucose, cholecalciferol, cialis, co-enzyme q10, freestyle lilly 14 day sensor, gabapentin, pen needles, ketoconazole, lisinopril, lisinopril, metformin, multiple vitamins-minerals, and omega-3 fatty acids.    ROS     ROS: 10 point ROS neg other than the symptoms noted above in the  HPI.    Physical Exam   VS: /80 (BP Location: Left arm, Patient Position: Chair, Cuff Size: Adult Large)   Pulse 57   Temp 97.8  F (36.6  C) (Oral)   Resp 12   Wt 109.2 kg (240 lb 12.8 oz)   BMI 33.12 kg/m    GENERAL: NAD, well dressed, answering questions appropriately, appears stated age.  HEENT: no exopthalmous, no proptosis, no lig lag, no retraction, no scleral icterus  RESPIRATORY: Clear bilaterally.  Normal respiratory effort.  CARDIOVASCULAR: RRR  NEUROLOGY: CN grossly intact, no tremors  MSK: grossly intact. Normal gait and station.  PSYCH: Intact judgment and insight. A&OX3 with a cordial affect.    LABS:  A1c:   Component      Latest Ref Rng & Units 1/10/2019 3/29/2019 6/24/2019   Hemoglobin A1C      0 - 5.6 % 7.5 (H) 8.2 (H) 7.7 (H)     Basic Metabolic Panel:  !COMPREHENSIVE Latest Ref Rng & Units 1/10/2019   SODIUM 133 - 144 mmol/L 143   POTASSIUM 3.4 - 5.3 mmol/L 4.2   CHLORIDE 94 - 109 mmol/L 110 (H)   CO2 mmol/L    BUN 7 - 30 mg/dL 12   Creatinine 0.66 - 1.25 mg/dL    Creatinine 0.66 - 1.25 mg/dL 0.89   Glucose 70 - 99 mg/dL 100 (H)   ANION GAP 3 - 14 mmol/L 10   CALCIUM 8.5 - 10.1 mg/dL 8.8   ALBUMIN 3.4 - 5.0 g/dL 4.0     LFTS/Cholesterol Panel:  !LIPID/HEPATIC Latest Ref Rng & Units 1/10/2019   CHOLESTEROL <200 mg/dL 96   TRIGLYCERIDES <150 mg/dL 89   HDL CHOLESTEROL >39 mg/dL 37 (L)   LDL CHOLESTEROL DIRECT 0 - 129 mg/dL    LDL CHOLESTEROL, CALCULATED <100 mg/dL 41   VLDL-CHOLESTEROL 0 - 30 mg/dL    NON HDL CHOLESTEROL <130 mg/dL 59   CHOLESTEROL/HDL RATIO 0.0 - 5.0    AST 0 - 45 U/L 67 (H)   ALT 0 - 70 U/L 82 (H)     Thyroid Function:   !THYROID Latest Ref Rng & Units 12/27/2017   TSH 0.40 - 4.00 mU/L 1.78     Urine MicroAlbumin:  Component      Latest Ref Rng & Units 1/10/2019   Creatinine Urine      mg/dL 240   Albumin Urine mg/L      mg/L 40   Albumin Urine mg/g Cr      0 - 17 mg/g Cr 16.83     Vitamin D:    All pertinent notes, labs, and images personally reviewed by me.      A/P  Mr.Patrick AKASH Rojas is a 59 year old here for the evaluation/management of diabetes:    1. DM2 - Uncontrolled - improved  Diabetes is complicated by diabetic retinopathy, nephropathy, and neuropathy.  Corwin shows average glucose of 140 over the past two weeks which is reasonable control.  No changes today.  Continue Metformin 1000 mg bid,  Continue Tresiba 55 units daily - consider decreasing dose if he notes nocturnal hypoglycemia.  Continue current dose of Humalog and Ozempic 1 mg daily.  Continue Corwin  Plan to review Corwin again in 2-4 weeks - make insulin dose adjustments at that time if indicated.     2. Hypertension - Historically controlled. BP elevated today.  See PCP for further evaluation/treatment.    3. Hyperlipidemia - On statin therapy    Labs ordered today:   No orders of the defined types were placed in this encounter.      Radiology/Consults ordered today: None    All questions were answered.  The patient indicates understanding of the above issues and agrees with the plan set forth.  Total face to face time greater than or equal to 25 minutes.       Follow-up:  3 months    Jasmin Arteaga NP  Endocrinology  [facility]  CC:

## 2019-07-10 NOTE — PATIENT INSTRUCTIONS
Go to [employer] and search Corwin cover - you can also order a product called Skin Tac that is applied before the Libe to make your skin stickier.      We can connect via a website and then remotely download your Corwin for review.  Call [employer] or send a [employer] message in 2-4 weeks and we'll download your Corwin report for my review - I'll let you know if I recommend any insulin dose changes.

## 2019-07-10 NOTE — PROGRESS NOTES
Subjective     Crispin Rojas is a 59 year old male who presents to clinic today for the following health issues:    HPI       Hospital Follow-up Visit:    Hospital/Nursing Home/IP Rehab Facility: [facility]  Date of Admission: 7/7/19  Date of Discharge: 7/7/19  Reason(s) for Admission: cholecystitis             Problems taking medications regularly:  None       Medication changes since discharge: None       Problems adhering to non-medication therapy:  None    Summary of hospitalization:  [facility] discharge summary reviewed  Diagnostic Tests/Treatments reviewed.  Follow up needed: none  Other Healthcare Providers Involved in Patient s Care:         Endocrine  Update since discharge: improved.     Post Discharge Medication Reconciliation: discharge medications reconciled, continue medications without change.  Plan of care communicated with patient     Coding guidelines for this visit:  Type of Medical   Decision Making Face-to-Face Visit       within 7 Days of discharge Face-to-Face Visit        within 14 days of discharge   Moderate Complexity [id number]   High Complexity [id number]              Reviewed his recent cellulitis issues and Podiatry consult    .  SUBJECTIVE:    HPI: diabetes and repeated foot infection and multiple surgeries         PROBLEM LIST:                   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     CARDIOVASCULAR SCREENING; LDL GOAL LESS THAN 100     Morbid obesity, unspecified obesity type (H)     Other specified transient cerebral ischemias     Calculus of kidney     Chronic left shoulder pain     Cervicalgia     Type 2 diabetes mellitus with peripheral neuropathy (H)     Hyperlipidemia LDL goal <100     Essential hypertension     Right bundle branch block     GILA (obstructive sleep apnea)     Thoracic aortic aneurysm without rupture (H)     Cellulitis     Cholecystitis     Osteomyelitis (H)       PAST MEDICAL  HISTORY:                  Past Medical History:   Diagnosis Date     Cerebral infarction (H)      Chronic infection      H/O heartburn      History of heartburn      Hypertension      Numbness and tingling      Obesity      GILA (obstructive sleep apnea) 10/22/2018     Renal stone      Type II or unspecified type diabetes mellitus without mention of complication, not stated as uncontrolled        PAST SURGICAL HISTORY:                  Past Surgical History:   Procedure Laterality Date     AMPUTATE FOOT Left 8/18/2016    Procedure: AMPUTATE FOOT;  Surgeon: Jack Younger DPM;  Location: [facility]     AMPUTATE TOE(S) Right 2/1/2016    Procedure: AMPUTATE TOE(S);  Surgeon: Rachelle Manriquez DPM, Pod;  Location: [facility] OR     AMPUTATE TOE(S) Right 8/2/2019    Procedure: Right fourth toe partial amputation for treatment of osteomyelitis.;  Surgeon: Jack Younger DPM;  Location: [facility]     ANGIOGRAM       COLONOSCOPY       COMBINED CYSTOSCOPY, RETROGRADES, URETEROSCOPY, INSERT STENT Left 8/21/2016    Procedure: COMBINED CYSTOSCOPY, RETROGRADES, URETEROSCOPY, INSERT STENT;  Surgeon: Artemio Valenzuela MD;  Location: [facility] OR     COMBINED CYSTOSCOPY, RETROGRADES, URETEROSCOPY, LASER HOLMIUM LITHOTRIPSY URETER(S), INSERT STENT Left 10/3/2016    Procedure: COMBINED CYSTOSCOPY, RETROGRADES, URETEROSCOPY, LASER HOLMIUM LITHOTRIPSY URETER(S), INSERT STENT;  Surgeon: Arteimo Valenzuela MD;  Location: [facility] OR     EXTRACORPOREAL SHOCK WAVE LITHOTRIPSY (ESWL) Left 9/8/2016    Procedure: EXTRACORPOREAL SHOCK WAVE LITHOTRIPSY (ESWL);  Surgeon: Artemio Valenzuela MD;  Location: [facility] OR     RECESSION GASTROCNEMIUS Right 2/1/2016    Procedure: RECESSION GASTROCNEMIUS;  Surgeon: Rachelle Manriquez DPM, Pod;  Location: [facility] OR       CURRENT MEDICATIONS:                  Current Outpatient Medications   Medication Sig Dispense Refill     lisinopril (PRINIVIL/ZESTRIL) 40 MG tablet Take 1 tablet (40 mg) by mouth daily 90 tablet 1      amLODIPine (NORVASC) 5 MG tablet Take 1 tablet (5 mg) by mouth daily 90 tablet 0     amoxicillin-clavulanate (AUGMENTIN) 875-125 MG tablet Take 1 tablet by mouth 2 times daily for 10 days 20 tablet 0     atorvastatin (LIPITOR) 40 MG tablet TAKE 1 TABLET BY MOUTH EVERY DAY 90 tablet 2     blood glucose (NO BRAND SPECIFIED) lancets standard Use to test blood sugar 3 times daily or as directed. 300 each 3     blood glucose monitoring (NO BRAND SPECIFIED) meter device kit Use to test blood sugar 3 times daily or as directed. 1 kit 0     blood glucose monitoring (NO BRAND SPECIFIED) test strip Use to test blood sugars 3 times daily or as directed 300 strip 3     Cholecalciferol (VITAMIN D3 PO) Take 1,000 Units by mouth daily        CIALIS 5 MG tablet TAKE 1 TABLET BY MOUTH EVERY DAY AS NEEDED 30 tablet 0     Co-Enzyme Q10 100 MG CAPS Take 100 mg by mouth daily        Continuous Blood Gluc Sensor (FREESTYLE LUCERO 14 DAY SENSOR) MISC 1 each every 14 days 2 each 11     gabapentin (NEURONTIN) 100 MG capsule Take 300 mg by mouth 3 times daily       insulin degludec (TRESIBA FLEXTOUCH) 100 UNIT/ML pen Inject 55 Units Subcutaneous daily (Patient taking differently: Inject 55 Units Subcutaneous daily Pt takes at 0300 every day due to his work schedule) 15 mL 11     insulin lispro (HUMALOG KWIKPEN) 100 UNIT/ML pen Inject Subcutaneous 3 times daily (before meals) humalog 1unit per 6 g carbohydrates with meals       Insulin Pen Needle (PEN NEEDLES) 31G X 5 MM MISC 1 Device 5 times daily , TWICE DAILY WITH LANTUS AND 3 TIMES A DAY PRN WITH NOVOLOG FLEXPEN 200 each 11     ketoconazole (NIZORAL) 2 % external shampoo Apply topically daily as needed , apply daily and wash off after 5 min       metFORMIN (GLUCOPHAGE-XR) 500 MG 24 hr tablet TAKE 2 TABLETS BY MOUTH TWICE DAILY WITH MEALS 360 tablet 3     Multiple Vitamins-Minerals (MULTIVITAMIN PO) Take 1 tablet by mouth daily        Omega-3 Fatty Acids (OMEGA-3 FISH OIL PO) Take 1 g by  mouth 2 times daily (with meals)        semaglutide (OZEMPIC) 1 MG/DOSE pen Inject 1 mg Subcutaneous every 7 days , on Wednesdays 2 pen 11             FAMILY HISTORY:                   Family History   Problem Relation Age of Onset     Arthritis Mother         SLE     Connective Tissue Disorder Mother         lupus     Diabetes Mother      Cerebrovascular Disease Mother      Cancer Mother      Diabetes Father      Diabetes Maternal Grandfather      Diabetes Sister        HEALTH MAINTENANCE:                    REVIEW OF OUTSIDE RECORDS: NO    REVIEW OF SYSTEMS:  CONSTITUTIONAL: NEGATIVE for fever, chills  EYES: NEGATIVE for vision changes   RESP: NEGATIVE for significant cough or SOB  CV: NEGATIVE for chest pain, palpitations   GI: NEGATIVE for nausea, abdominal pain, heartburn, or change in bowel habits  : NEGATIVE for frequency, dysuria, or hematuria  MUSCULOSKELETAL: NEGATIVE for significant arthralgias or myalgia  NEURO: NEGATIVE for weakness, dizziness or paresthesias or headache  NVS:no headache or balance issus  INTEG:no moles or new rashes  LYMPH:no nodes or night sweats    EXAM:    /80 (BP Location: Right arm, Patient Position: Chair, Cuff Size: Adult Large)   Pulse 54   Temp 97.6  F (36.4  C) (Oral)   Resp 14   Wt 109.1 kg (240 lb 8 oz)   SpO2 96%   BMI 33.08 kg/m    GENERAL APPEARANCE: healthy, alert and no distress   EXAM:  GENERAL APPEARANCE: healthy, alert and no distress  EYES: EOMI,  PERRL  HENT: ear canals and TM's normal and nose and mouth without ulcers or lesions  RESP: lungs clear to auscultation - no rales, rhonchi or wheezes  CV: regular rates and rhythm, normal S1 S2, no S3 or S4 and no murmur, click or rub -  ABDOMEN:  soft, nontender, no HSM or masses and bowel sounds normal  BACK:no tenderness or pain on straight let raise           ASSESSMENT/PLAN  1. Benign essential hypertension    (I10) Benign essential hypertension  (primary encounter diagnosis)  Comment:   Plan:  lisinopril (PRINIVIL/ZESTRIL) 40 MG tablet        Med revision    (E66.01) Morbid obesity (H)  Comment:   Plan: Body mass index is 33.08 kg/m .  Diabetic, pvd     (M86.08) Acute hematogenous osteomyelitis of other site (H)  Comment:   Plan: second surgery     (E11.42,  Z79.4) Type 2 diabetes mellitus with diabetic polyneuropathy, with long-term current use of insulin (H)  Comment:   Plan: seeing Podiatry for ulceration issues in feet                                      I have discussed with patient the risks, benefits, medications, treatment options and modalities.   I have instructed the patient to call or schedule a follow-up appointment if any problems or failure to improve.

## 2019-07-10 NOTE — TELEPHONE ENCOUNTER
Routed to [facility], please call pt and schedule visit with Aden Lopez MD, see Aden Lopez MD note below  June Thomas RN, BSN  Message handled by Nurse Triage.

## 2019-07-10 NOTE — TELEPHONE ENCOUNTER
Spoke with patient, patient here @ clinic seeing abelardo Phillips'd per Dr. Lopez, added patient to schedule today @ 3:30. Ruth Behrens.

## 2019-07-10 NOTE — TELEPHONE ENCOUNTER
I would work Pat in in the next week as an extra . The same for any of my patients with ER/Hospital requested follow up

## 2019-07-10 NOTE — LETTER
7/10/2019         RE: Crispin Rojas  [address] [facility] [id number]        Dear Colleague,    Thank you for referring your patient, Crispin Rojas, to the [facility]. Please see a copy of my visit note below.    Name: Crispin Rojas  Seen today for Diabetes (Last seen 3/29/2019).  HPI:  Crispin Rojas is a 59 year old male who presents for the management of:    1. Type 2 DM:  Originally diagnosed at the age of: 1997.  Insulin treated 15+ years.    Current Regimen: Metformin 1000 mg bid, Ozempic 1 mg weekly , Tresiba 55 units q am Humalog 18-27 units (full meal) or 17 units for a snack tid + 2:50>150.  Takes 1/2 dose of Tresiba if going to be very active.    Started Bydureon 5/2018 - tolerated well.    Changed to Ozempic 6/2018 - feels Ozempic is working better  Using personal CGM - Corwin  Hospitalized 6/2019 for cellulitis and bilateral diabetic foot ulcers.  Healed now.    BS checks: 3-5 times per day, + Corwin  Meter Download:   Corwin CGM download: Average glucose 140; 26% above 180, 61% in target range .    Working with diabetes ed - last seen by June Walker 1/14/2019 - discussed insulin pump therapy , not interested in pump therapy at this time.    Taking gabapenin per neurology for ear/nerve problem.    Complications:   Diabetes Complications  Description / Detail    Diabetic Retinopathy   + diabetic retinopathy, last exam 6/2019, also sees  retinal specialist annually   CAD / PAD  No   Neuropathy   Yes, sees Dr. Molina - no open foot lesions at this time.   Nephropathy / Microalbuminuria  Yes, elevated microalbumin   Gastroparesis  No   Hypoglycemia Unawareness  No       2. Hypertension: Blood Pressure today:   BP Readings from Last 3 Encounters:   07/10/19 160/80   07/10/19 150/80   07/07/19 137/60   .  Blood pressure medications include Amlodipine 5 mg qd, lisinopril 20 mg qd.   3. Hyperlipidemia: Takes Atorvastatin 40 mg qd for lipid control.    4.  Prevention:  Flu Shot- annually  Pneumovax- 11/2015  Opthalmology-annaully  Dental-recommend semiannually  ASA-  Smoking-   PMH/PSH:  Past Medical History:   Diagnosis Date     Cerebral infarction (H)      Chronic infection      H/O heartburn      History of heartburn      Hypertension      Numbness and tingling      Obesity      GILA (obstructive sleep apnea) 10/22/2018     Renal stone      Type II or unspecified type diabetes mellitus without mention of complication, not stated as uncontrolled      Past Surgical History:   Procedure Laterality Date     AMPUTATE FOOT Left 8/18/2016    Procedure: AMPUTATE FOOT;  Surgeon: Jack Younger DPM;  Location: [facility]     AMPUTATE TOE(S) Right 2/1/2016    Procedure: AMPUTATE TOE(S);  Surgeon: Rachelle Manriquez DPM, Pod;  Location: [facility] OR     ANGIOGRAM       COLONOSCOPY       COMBINED CYSTOSCOPY, RETROGRADES, URETEROSCOPY, INSERT STENT Left 8/21/2016    Procedure: COMBINED CYSTOSCOPY, RETROGRADES, URETEROSCOPY, INSERT STENT;  Surgeon: Artemio Valenzuela MD;  Location: [facility]     COMBINED CYSTOSCOPY, RETROGRADES, URETEROSCOPY, LASER HOLMIUM LITHOTRIPSY URETER(S), INSERT STENT Left 10/3/2016    Procedure: COMBINED CYSTOSCOPY, RETROGRADES, URETEROSCOPY, LASER HOLMIUM LITHOTRIPSY URETER(S), INSERT STENT;  Surgeon: Artemio Valenzuela MD;  Location: [facility] OR     EXTRACORPOREAL SHOCK WAVE LITHOTRIPSY (ESWL) Left 9/8/2016    Procedure: EXTRACORPOREAL SHOCK WAVE LITHOTRIPSY (ESWL);  Surgeon: Artemio Valenzuela MD;  Location: [facility] OR     RECESSION GASTROCNEMIUS Right 2/1/2016    Procedure: RECESSION GASTROCNEMIUS;  Surgeon: Rachelle Manriquez DPM, Pod;  Location: [facility] OR     Family Hx:  Family History   Problem Relation Age of Onset     Arthritis Mother         SLE     Connective Tissue Disorder Mother         lupus     Diabetes Mother      Cerebrovascular Disease Mother      Cancer Mother      Diabetes Father      Diabetes Maternal Grandfather      Diabetes Sister      Thyroid  disease:          DM2: Yes: Strong family history on mothers side - mom, 7 maternal uncles, two maternal aunts, + MGF.         Autoimmune: DM1, SLE, RA, Vitiligo Yes: Lupus    Social Hx:  Social History     Socioeconomic History     Marital status:      Spouse name: Sydney     Number of children: 2     Years of education: Not on file     Highest education level: Not on file   Occupational History     Occupation: marketing     Employer: [employer]     Comment: [employer]   Social Needs     Financial resource strain: Not on file     Food insecurity:     Worry: Not on file     Inability: Not on file     Transportation needs:     Medical: Not on file     Non-medical: Not on file   Tobacco Use     Smoking status: Never Smoker     Smokeless tobacco: Never Used   Substance and Sexual Activity     Alcohol use: Yes     Alcohol/week: 0.0 oz     Comment: rare---red wine 2x per week     Drug use: No     Sexual activity: Yes     Partners: Female   Lifestyle     Physical activity:     Days per week: Not on file     Minutes per session: Not on file     Stress: Not on file   Relationships     Social connections:     Talks on phone: Not on file     Gets together: Not on file     Attends [employer] service: Not on file     Active member of club or organization: Not on file     Attends meetings of clubs or organizations: Not on file     Relationship status: Not on file     Intimate partner violence:     Fear of current or ex partner: Not on file     Emotionally abused: Not on file     Physically abused: Not on file     Forced sexual activity: Not on file   Other Topics Concern     Parent/sibling w/ CABG, MI or angioplasty before 65F 55M? No   Social History Narrative     Not on file          MEDICATIONS:  has a current medication list which includes the following prescription(s): insulin degludec, insulin lispro, semaglutide, amlodipine, atorvastatin, blood glucose, blood glucose monitoring, blood glucose, cholecalciferol,  cialis, co-enzyme q10, freestyle lilly 14 day sensor, gabapentin, pen needles, ketoconazole, lisinopril, lisinopril, metformin, multiple vitamins-minerals, and omega-3 fatty acids.    ROS     ROS: 10 point ROS neg other than the symptoms noted above in the HPI.    Physical Exam   VS: /80 (BP Location: Left arm, Patient Position: Chair, Cuff Size: Adult Large)   Pulse 57   Temp 97.8  F (36.6  C) (Oral)   Resp 12   Wt 109.2 kg (240 lb 12.8 oz)   BMI 33.12 kg/m     GENERAL: NAD, well dressed, answering questions appropriately, appears stated age.  HEENT: no exopthalmous, no proptosis, no lig lag, no retraction, no scleral icterus  RESPIRATORY: Clear bilaterally.  Normal respiratory effort.  CARDIOVASCULAR: RRR  NEUROLOGY: CN grossly intact, no tremors  MSK: grossly intact. Normal gait and station.  PSYCH: Intact judgment and insight. A&OX3 with a cordial affect.    LABS:  A1c:   Component      Latest Ref Rng & Units 1/10/2019 3/29/2019 6/24/2019   Hemoglobin A1C      0 - 5.6 % 7.5 (H) 8.2 (H) 7.7 (H)     Basic Metabolic Panel:  !COMPREHENSIVE Latest Ref Rng & Units 1/10/2019   SODIUM 133 - 144 mmol/L 143   POTASSIUM 3.4 - 5.3 mmol/L 4.2   CHLORIDE 94 - 109 mmol/L 110 (H)   CO2 mmol/L    BUN 7 - 30 mg/dL 12   Creatinine 0.66 - 1.25 mg/dL    Creatinine 0.66 - 1.25 mg/dL 0.89   Glucose 70 - 99 mg/dL 100 (H)   ANION GAP 3 - 14 mmol/L 10   CALCIUM 8.5 - 10.1 mg/dL 8.8   ALBUMIN 3.4 - 5.0 g/dL 4.0     LFTS/Cholesterol Panel:  !LIPID/HEPATIC Latest Ref Rng & Units 1/10/2019   CHOLESTEROL <200 mg/dL 96   TRIGLYCERIDES <150 mg/dL 89   HDL CHOLESTEROL >39 mg/dL 37 (L)   LDL CHOLESTEROL DIRECT 0 - 129 mg/dL    LDL CHOLESTEROL, CALCULATED <100 mg/dL 41   VLDL-CHOLESTEROL 0 - 30 mg/dL    NON HDL CHOLESTEROL <130 mg/dL 59   CHOLESTEROL/HDL RATIO 0.0 - 5.0    AST 0 - 45 U/L 67 (H)   ALT 0 - 70 U/L 82 (H)     Thyroid Function:   !THYROID Latest Ref Rng & Units 12/27/2017   TSH 0.40 - 4.00 mU/L 1.78     Urine  MicroAlbumin:  Component      Latest Ref Rng & Units 1/10/2019   Creatinine Urine      mg/dL 240   Albumin Urine mg/L      mg/L 40   Albumin Urine mg/g Cr      0 - 17 mg/g Cr 16.83     Vitamin D:    All pertinent notes, labs, and images personally reviewed by me.     A/P  Mr.Patrick AKASH Rojas is a 59 year old here for the evaluation/management of diabetes:    1. DM2 - Uncontrolled - improved  Diabetes is complicated by diabetic retinopathy, nephropathy, and neuropathy.  Corwin shows average glucose of 140 over the past two weeks which is reasonable control.  No changes today.  Continue Metformin 1000 mg bid,  Continue Tresiba 55 units daily - consider decreasing dose if he notes nocturnal hypoglycemia.  Continue current dose of Humalog and Ozempic 1 mg daily.  Continue Corwin  Plan to review Corwin again in 2-4 weeks - make insulin dose adjustments at that time if indicated.     2. Hypertension - Historically controlled. BP elevated today.  See PCP for further evaluation/treatment.    3. Hyperlipidemia - On statin therapy    Labs ordered today:   No orders of the defined types were placed in this encounter.      Radiology/Consults ordered today: None    All questions were answered.  The patient indicates understanding of the above issues and agrees with the plan set forth.  Total face to face time greater than or equal to 25 minutes.       Follow-up:  3 months    Jasmin Arteaga NP  Endocrinology  [facility]  CC:     Again, thank you for allowing me to participate in the care of your patient.        Sincerely,        SARAH Raymundo CNP

## 2019-07-19 ENCOUNTER — TELEPHONE (OUTPATIENT)
Dept: ENDOCRINOLOGY | Facility: CLINIC | Age: 59
End: 2019-07-19

## 2019-07-19 ENCOUNTER — ALLIED HEALTH/NURSE VISIT (OUTPATIENT)
Dept: NURSING | Facility: CLINIC | Age: 59
End: 2019-07-19
Payer: COMMERCIAL

## 2019-07-19 VITALS — HEART RATE: 68 BPM | DIASTOLIC BLOOD PRESSURE: 62 MMHG | SYSTOLIC BLOOD PRESSURE: 138 MMHG

## 2019-07-19 DIAGNOSIS — I10 ESSENTIAL HYPERTENSION: Primary | ICD-10-CM

## 2019-07-19 PROCEDURE — 99207 ZZC NO CHARGE NURSE ONLY: CPT

## 2019-07-19 NOTE — TELEPHONE ENCOUNTER
Insulin-Treated Diabetes Mellitus Driving Report form received.  The DMV no longer accepts this form via fa.  (ph#[phone])  Call patient to let him know when it is done.  He may want to pick it up in person.   Form on Jasmin Arteaga's desk.  Kristie Mares CMA on 7/19/2019 at 3:04 PM

## 2019-07-19 NOTE — PROGRESS NOTES
Crispin Rojas is a 59 year old patient who comes in today for a Blood Pressure check.  Initial BP:  /62 (BP Location: Right arm, Patient Position: Chair, Cuff Size: Adult Large)   Pulse 68      68  Disposition: follow-up as previously indicated by provider    Shania Warren/KOBI  [place]---[facility]

## 2019-07-19 NOTE — TELEPHONE ENCOUNTER
Patient is requesting to speak with Kristie regarding getting information of down loading his meter information through a damion and regarding a DMV form. Please call patient back around 10:00am today at [phone].  Goldie Stinson

## 2019-07-19 NOTE — TELEPHONE ENCOUNTER
Spoke with patient.  He will pick it up at the  or his son will.  Letter at  for .  Kristie Mares CMA on 7/19/2019 at 4:09 PM

## 2019-07-19 NOTE — TELEPHONE ENCOUNTER
Spoke with patient.  Invited patient to [facility] as he had not yet been invited.  Concern resolved.  Kristie Mares CMA on 7/19/2019 at 10:25 AM

## 2019-07-29 ENCOUNTER — OFFICE VISIT (OUTPATIENT)
Dept: PODIATRY | Facility: CLINIC | Age: 59
End: 2019-07-29
Payer: COMMERCIAL

## 2019-07-29 ENCOUNTER — ANCILLARY PROCEDURE (OUTPATIENT)
Dept: GENERAL RADIOLOGY | Facility: CLINIC | Age: 59
End: 2019-07-29
Attending: PODIATRIST
Payer: COMMERCIAL

## 2019-07-29 VITALS
HEIGHT: 72 IN | DIASTOLIC BLOOD PRESSURE: 62 MMHG | WEIGHT: 242 LBS | BODY MASS INDEX: 32.78 KG/M2 | SYSTOLIC BLOOD PRESSURE: 146 MMHG

## 2019-07-29 DIAGNOSIS — S98.112A AMPUTATED GREAT TOE OF LEFT FOOT (H): ICD-10-CM

## 2019-07-29 DIAGNOSIS — M79.89 SWELLING OF LEFT FOOT: ICD-10-CM

## 2019-07-29 DIAGNOSIS — L97.512 ULCER OF TOE, RIGHT, WITH FAT LAYER EXPOSED (H): ICD-10-CM

## 2019-07-29 DIAGNOSIS — L97.512 ULCER OF TOE, RIGHT, WITH FAT LAYER EXPOSED (H): Primary | ICD-10-CM

## 2019-07-29 DIAGNOSIS — E11.42 TYPE 2 DIABETES MELLITUS WITH PERIPHERAL NEUROPATHY (H): ICD-10-CM

## 2019-07-29 PROCEDURE — 73660 X-RAY EXAM OF TOE(S): CPT | Mod: RT

## 2019-07-29 PROCEDURE — 99213 OFFICE O/P EST LOW 20 MIN: CPT | Performed by: PODIATRIST

## 2019-07-29 PROCEDURE — 73630 X-RAY EXAM OF FOOT: CPT | Mod: LT

## 2019-07-29 ASSESSMENT — MIFFLIN-ST. JEOR: SCORE: 1952.76

## 2019-07-29 NOTE — Clinical Note
ZOEY.  The MRI might come back on your watch.  He can be offered admission versus notify Gay to set up same day surgery for right 4th toe amp.  I can also take care of all of that next week, but he should know results. Thanks.

## 2019-07-29 NOTE — LETTER
7/29/2019         RE: Crispin Rojas  [address] [facility] [id number]        Dear Colleague,    Thank you for referring your patient, Crispin Rojas, to the [facility]. Please see a copy of my visit note below.    ASSESSMENT/PLAN:    Encounter Diagnoses   Name Primary?     Ulcer of toe, right, with fat layer exposed (H) Yes     Swelling of left foot      Type 2 diabetes mellitus with peripheral neuropathy (H)      Amputated great toe of left foot (H)      Although his right 4th toe looks stable, I am concerned that there is osteomyelitis. This is because of the probing to a hard endpoint and the fact that other wounds ave healed, but this one hasn't.     He is to continue current wound cares and daily monitoring.    MRI right foot ordered.    If MRI suggests osteomyelitis, we will discuss surgery/ toe amputation.  This could be done on an outpatient basis, if the toe remains stable.     Body mass index is 33.28 kg/m .    Weight management plan: Patient was referred to their PCP to discuss a diet and exercise plan.      Valentino Molina DPM, FACFAS, MS    [place] Department of Podiatry/Foot & Ankle Surgery      ____________________________________________________________________    HPI:         Chief Complaint: follow up for bilateral foot wounds; recent hospitalization (6/24-6/27) for left lower extremity cellulitis.   He had bilateral foot wounds at that time. Podiatry was consulted. Imaging was negative at that time. There was no indication for surgery. He was treated with IV antibiotics. WOC RN assisted with wound care.     Today he says that all wounds are healed, but the one on his right 4th toe.  Previous wounds were at the right plantar forefoot, left first metatarsal head region    Type 2 DM; peripheral neuropathy; Last Hgb A1C = 8.2%.      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     CARDIOVASCULAR  SCREENING; LDL GOAL LESS THAN 100     Morbid obesity, unspecified obesity type (H)     Other specified transient cerebral ischemias     Calculus of kidney     Chronic left shoulder pain     Cervicalgia     Type 2 diabetes mellitus with peripheral neuropathy (H)     Hyperlipidemia LDL goal <100     Essential hypertension     Right bundle branch block     GILA (obstructive sleep apnea)     Thoracic aortic aneurysm without rupture (H)     Cellulitis     Cholecystitis     Past Surgical History:   Procedure Laterality Date     AMPUTATE FOOT Left 8/18/2016    Procedure: AMPUTATE FOOT;  Surgeon: Jack Younger DPM;  Location: [facility]     AMPUTATE TOE(S) Right 2/1/2016    Procedure: AMPUTATE TOE(S);  Surgeon: Rachelle Manriquez DPM, Pod;  Location: [facility] OR     ANGIOGRAM       COLONOSCOPY       COMBINED CYSTOSCOPY, RETROGRADES, URETEROSCOPY, INSERT STENT Left 8/21/2016    Procedure: COMBINED CYSTOSCOPY, RETROGRADES, URETEROSCOPY, INSERT STENT;  Surgeon: Artemio Valenzuela MD;  Location: [facility] OR     COMBINED CYSTOSCOPY, RETROGRADES, URETEROSCOPY, LASER HOLMIUM LITHOTRIPSY URETER(S), INSERT STENT Left 10/3/2016    Procedure: COMBINED CYSTOSCOPY, RETROGRADES, URETEROSCOPY, LASER HOLMIUM LITHOTRIPSY URETER(S), INSERT STENT;  Surgeon: Artemio Valenzuela MD;  Location: [facility] OR     EXTRACORPOREAL SHOCK WAVE LITHOTRIPSY (ESWL) Left 9/8/2016    Procedure: EXTRACORPOREAL SHOCK WAVE LITHOTRIPSY (ESWL);  Surgeon: Artemio Valenzuela MD;  Location: [facility] OR     RECESSION GASTROCNEMIUS Right 2/1/2016    Procedure: RECESSION GASTROCNEMIUS;  Surgeon: Rachelle Manriquez DPM, Pod;  Location: [facility] OR     Current Outpatient Medications   Medication Sig Dispense Refill     amLODIPine (NORVASC) 5 MG tablet Take 1 tablet (5 mg) by mouth daily 90 tablet 0     atorvastatin (LIPITOR) 40 MG tablet TAKE 1 TABLET BY MOUTH EVERY DAY 90 tablet 2     blood glucose (NO BRAND SPECIFIED) lancets standard Use to test blood sugar 3 times daily or as directed.  "300 each 3     blood glucose monitoring (NO BRAND SPECIFIED) meter device kit Use to test blood sugar 3 times daily or as directed. 1 kit 0     blood glucose monitoring (NO BRAND SPECIFIED) test strip Use to test blood sugars 3 times daily or as directed 300 strip 3     Cholecalciferol (VITAMIN D3 PO) Take 1,000 Units by mouth daily        CIALIS 5 MG tablet TAKE 1 TABLET BY MOUTH EVERY DAY AS NEEDED 30 tablet 0     Co-Enzyme Q10 100 MG CAPS Take 100 mg by mouth daily        Continuous Blood Gluc Sensor (FREESTYLE LUCERO 14 DAY SENSOR) MISC 1 each every 14 days 2 each 11     gabapentin (NEURONTIN) 100 MG capsule Take 300 mg by mouth 3 times daily       insulin degludec (TRESIBA FLEXTOUCH) 100 UNIT/ML pen Inject 55 Units Subcutaneous daily 15 mL 11     insulin lispro (HUMALOG PEN) 100 UNIT/ML pen Inject 30 Units Subcutaneous 3 times daily (before meals) Breakfast and Dinner 30 mL 11     Insulin Pen Needle (PEN NEEDLES) 31G X 5 MM MISC 1 Device 5 times daily , TWICE DAILY WITH LANTUS AND 3 TIMES A DAY PRN WITH NOVOLOG FLEXPEN 200 each 11     ketoconazole (NIZORAL) 2 % external shampoo Apply topically daily as needed , apply daily and wash off after 5 min       lisinopril (PRINIVIL/ZESTRIL) 40 MG tablet Take 1 tablet (40 mg) by mouth daily 90 tablet 1     metFORMIN (GLUCOPHAGE-XR) 500 MG 24 hr tablet TAKE 2 TABLETS BY MOUTH TWICE DAILY WITH MEALS 360 tablet 3     Multiple Vitamins-Minerals (MULTIVITAMIN PO) Take 1 tablet by mouth daily        Omega-3 Fatty Acids (OMEGA-3 FISH OIL PO) Take 1 g by mouth 2 times daily (with meals)        semaglutide (OZEMPIC) 1 MG/DOSE pen Inject 1 mg Subcutaneous every 7 days , on Wednesdays 2 pen 11     lisinopril (PRINIVIL/ZESTRIL) 20 MG tablet Take 1 tablet (20 mg) by mouth daily (Patient not taking: Reported on 7/29/2019) 90 tablet 0       EXAM:    Vitals: BP (!) 146/62   Ht 1.816 m (5' 11.5\")   Wt 109.8 kg (242 lb)   BMI 33.28 kg/m     BMI: Body mass index is 33.28 kg/m .  Height: " "5' 11.5\"    Constitutional/ general:  Pt is in no apparent distress, appears well-nourished.  Cooperative with history and physical exam.     Vascular: strong pedal pulses bilateral foot; Left leg edeam     Neuro: Lower extremity sensation is diminished to light touch b/l.     Musculoskeletal: s/p R partial 1st toe amputation, L total 1st toe amputation.  Patient is ambulatory without assistive device or brace.  No gross ankle deformity noted.  No foot or ankle joint effusion is noted.     Derm:   1) R dorsolateral 4th toe with ulceration with exposed fat layer, 0.5 x 0.5cm.  Today is probes to a hard endpoint.  There is edema of the toe. No erythema, malodor, purulence or necrosis.      2) R plantar forefoot 1st met area . This has nearly healed. Very superficial. Clean.     3) L plantar 1st met head area:Healed      XR RIGHT TOE TWO OR MORE VIEWS   7/29/2019 4:51 PM      HISTORY: Nonhealing ulcer, dorsal left 4th toe. Ulcer of toe, right,  with fat layer exposed (H).     COMPARISON: X-ray from 6/26/2019.                                                                      IMPRESSION: No lytic or destructive lesions in the fourth toe to  suggest osteomyelitis. No significant change.     KAVON GILLESPIE MD    Again, thank you for allowing me to participate in the care of your patient.        Sincerely,        Valentino Molina DPM    "

## 2019-07-29 NOTE — PATIENT INSTRUCTIONS
Thank you for choosing [facility]!    DR. CANDELARIA'S CLINIC LOCATIONS     MONDAY - [place] WEDNESDAY (AM ONLY) - [place]   [address] [place] [address] [address] [address]   [phone] / [phone] / [phone]       THURSDAY - [place] SCHEDULE SURGERY: [phone]   [address] APPOINTMENTS: [phone]   [place] [address] BILLING QUESTIONS: [phone] / [phone]         [facility] SCHEDULING  They should be calling you within 24 hours to schedule your scan.  If not, please call the location discussed at your appointment.    1) [facility]:       [phone]      [address] [place] [address]    2) [facility]:      [phone]      [address] [place] [address]    3) [facility]:       [phone]      [address] [place] [address]    * We will call you with the results. Please allow 24-48 hours for the results to be read and final.        SIGNS OF INFECTION    expanding redness around the wound     yellow or greenish-colored pus or cloudy wound drainage     red streaking spreading from the wound     increased swelling, tenderness, or pain around the wound     fever  *If you notice any of these signs of infection, call us right away!    WOUND CARE INSTRUCTIONS    1)  Keep the wound covered by a bandage when bathing.    2)  Gently clean the wound with soap water, separate from bath/shower water.      3)  Each day, apply a topical antibiotic ointment to the wound (Neosporin, Triple antibiotic, Bacitracin). Cover with large band-aid or gauze.      5)  Please seek immediate medical attention if any increasing redness, drainage, smell, or pain related to the wound.     6)  Please return to clinic in the period of time requested by Dr. Candelaria.            FYI: BODY WEIGHT AND YOUR FEET  The following  information is included in the after visit summary for all patients. Body weight can be a sensitive issue to discuss in clinic, but we think the following information is very important. Although we focus on the feet and ankles, we do support the overall health of our patients.     Many things can cause foot and ankle problems. Foot structure, activity level, foot mechanics and injuries are common causes of pain. One very important issue that often goes unmentioned, is body weight. Extra weight can cause increased stress on muscles, ligaments, bones and tendons. Sometimes just a few extra pounds is all it takes to put one over her/his threshold. Without reducing that stress, it can be difficult to alleviate pain. As Foot & Ankle specialists, our job is addressing the lower extremity problem and possible causes. Regarding extra body weight, we encourage patients to discuss diet and weight management plans with their primary care doctors. It is this team approach that gives you the best opportunity for pain relief and getting you back on your feet.      [facility] has a Comprehensive Weight Management Program. This program includes counseling, education, non-surgical and surgical approaches to weight loss. If you are interested in learning more either talk to you primary care provider or call [phone].    Crispin to follow up with Primary Care provider regarding elevated blood pressure.

## 2019-07-30 NOTE — PROGRESS NOTES
ASSESSMENT/PLAN:    Encounter Diagnoses   Name Primary?     Ulcer of toe, right, with fat layer exposed (H) Yes     Swelling of left foot      Type 2 diabetes mellitus with peripheral neuropathy (H)      Amputated great toe of left foot (H)      Although his right 4th toe looks stable, I am concerned that there is osteomyelitis. This is because of the probing to a hard endpoint and the fact that other wounds ave healed, but this one hasn't.     He is to continue current wound cares and daily monitoring.    MRI right foot ordered.    If MRI suggests osteomyelitis, we will discuss surgery/ toe amputation.  This could be done on an outpatient basis, if the toe remains stable.     Body mass index is 33.28 kg/m .    Weight management plan: Patient was referred to their PCP to discuss a diet and exercise plan.      Valentino Molina DPM, FACFAS, MS    [place] Department of Podiatry/Foot & Ankle Surgery      ____________________________________________________________________    HPI:         Chief Complaint: follow up for bilateral foot wounds; recent hospitalization (6/24-6/27) for left lower extremity cellulitis.   He had bilateral foot wounds at that time. Podiatry was consulted. Imaging was negative at that time. There was no indication for surgery. He was treated with IV antibiotics. WOC RN assisted with wound care.     Today he says that all wounds are healed, but the one on his right 4th toe.  Previous wounds were at the right plantar forefoot, left first metatarsal head region    Type 2 DM; peripheral neuropathy; Last Hgb A1C = 8.2%.      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     CARDIOVASCULAR SCREENING; LDL GOAL LESS THAN 100     Morbid obesity, unspecified obesity type (H)     Other specified transient cerebral ischemias     Calculus of kidney     Chronic left shoulder pain     Cervicalgia     Type 2 diabetes mellitus with peripheral neuropathy (H)      Hyperlipidemia LDL goal <100     Essential hypertension     Right bundle branch block     GILA (obstructive sleep apnea)     Thoracic aortic aneurysm without rupture (H)     Cellulitis     Cholecystitis     Past Surgical History:   Procedure Laterality Date     AMPUTATE FOOT Left 8/18/2016    Procedure: AMPUTATE FOOT;  Surgeon: Jack Younger DPM;  Location: [facility] OR     AMPUTATE TOE(S) Right 2/1/2016    Procedure: AMPUTATE TOE(S);  Surgeon: Rachelle Manriquez DPM, Pod;  Location: [facility] OR     ANGIOGRAM       COLONOSCOPY       COMBINED CYSTOSCOPY, RETROGRADES, URETEROSCOPY, INSERT STENT Left 8/21/2016    Procedure: COMBINED CYSTOSCOPY, RETROGRADES, URETEROSCOPY, INSERT STENT;  Surgeon: Artemio Valenzuela MD;  Location: [facility] OR     COMBINED CYSTOSCOPY, RETROGRADES, URETEROSCOPY, LASER HOLMIUM LITHOTRIPSY URETER(S), INSERT STENT Left 10/3/2016    Procedure: COMBINED CYSTOSCOPY, RETROGRADES, URETEROSCOPY, LASER HOLMIUM LITHOTRIPSY URETER(S), INSERT STENT;  Surgeon: Artemio Valenzuela MD;  Location: [facility] OR     EXTRACORPOREAL SHOCK WAVE LITHOTRIPSY (ESWL) Left 9/8/2016    Procedure: EXTRACORPOREAL SHOCK WAVE LITHOTRIPSY (ESWL);  Surgeon: Artemio Valenzuela MD;  Location: [facility] OR     RECESSION GASTROCNEMIUS Right 2/1/2016    Procedure: RECESSION GASTROCNEMIUS;  Surgeon: Rachelle Manriquez DPM, Pod;  Location: [facility] OR     Current Outpatient Medications   Medication Sig Dispense Refill     amLODIPine (NORVASC) 5 MG tablet Take 1 tablet (5 mg) by mouth daily 90 tablet 0     atorvastatin (LIPITOR) 40 MG tablet TAKE 1 TABLET BY MOUTH EVERY DAY 90 tablet 2     blood glucose (NO BRAND SPECIFIED) lancets standard Use to test blood sugar 3 times daily or as directed. 300 each 3     blood glucose monitoring (NO BRAND SPECIFIED) meter device kit Use to test blood sugar 3 times daily or as directed. 1 kit 0     blood glucose monitoring (NO BRAND SPECIFIED) test strip Use to test blood sugars 3 times daily or as directed 300 strip 3  "    Cholecalciferol (VITAMIN D3 PO) Take 1,000 Units by mouth daily        CIALIS 5 MG tablet TAKE 1 TABLET BY MOUTH EVERY DAY AS NEEDED 30 tablet 0     Co-Enzyme Q10 100 MG CAPS Take 100 mg by mouth daily        Continuous Blood Gluc Sensor (FREESTYLE LUCERO 14 DAY SENSOR) MISC 1 each every 14 days 2 each 11     gabapentin (NEURONTIN) 100 MG capsule Take 300 mg by mouth 3 times daily       insulin degludec (TRESIBA FLEXTOUCH) 100 UNIT/ML pen Inject 55 Units Subcutaneous daily 15 mL 11     insulin lispro (HUMALOG PEN) 100 UNIT/ML pen Inject 30 Units Subcutaneous 3 times daily (before meals) Breakfast and Dinner 30 mL 11     Insulin Pen Needle (PEN NEEDLES) 31G X 5 MM MISC 1 Device 5 times daily , TWICE DAILY WITH LANTUS AND 3 TIMES A DAY PRN WITH NOVOLOG FLEXPEN 200 each 11     ketoconazole (NIZORAL) 2 % external shampoo Apply topically daily as needed , apply daily and wash off after 5 min       lisinopril (PRINIVIL/ZESTRIL) 40 MG tablet Take 1 tablet (40 mg) by mouth daily 90 tablet 1     metFORMIN (GLUCOPHAGE-XR) 500 MG 24 hr tablet TAKE 2 TABLETS BY MOUTH TWICE DAILY WITH MEALS 360 tablet 3     Multiple Vitamins-Minerals (MULTIVITAMIN PO) Take 1 tablet by mouth daily        Omega-3 Fatty Acids (OMEGA-3 FISH OIL PO) Take 1 g by mouth 2 times daily (with meals)        semaglutide (OZEMPIC) 1 MG/DOSE pen Inject 1 mg Subcutaneous every 7 days , on Wednesdays 2 pen 11     lisinopril (PRINIVIL/ZESTRIL) 20 MG tablet Take 1 tablet (20 mg) by mouth daily (Patient not taking: Reported on 7/29/2019) 90 tablet 0       EXAM:    Vitals: BP (!) 146/62   Ht 1.816 m (5' 11.5\")   Wt 109.8 kg (242 lb)   BMI 33.28 kg/m    BMI: Body mass index is 33.28 kg/m .  Height: 5' 11.5\"    Constitutional/ general:  Pt is in no apparent distress, appears well-nourished.  Cooperative with history and physical exam.     Vascular: strong pedal pulses bilateral foot; Left leg edeam     Neuro: Lower extremity sensation is diminished to light " touch b/l.     Musculoskeletal: s/p R partial 1st toe amputation, L total 1st toe amputation.  Patient is ambulatory without assistive device or brace.  No gross ankle deformity noted.  No foot or ankle joint effusion is noted.     Derm:   1) R dorsolateral 4th toe with ulceration with exposed fat layer, 0.5 x 0.5cm.  Today is probes to a hard endpoint.  There is edema of the toe. No erythema, malodor, purulence or necrosis.      2) R plantar forefoot 1st met area . This has nearly healed. Very superficial. Clean.     3) L plantar 1st met head area:Healed      XR RIGHT TOE TWO OR MORE VIEWS   7/29/2019 4:51 PM      HISTORY: Nonhealing ulcer, dorsal left 4th toe. Ulcer of toe, right,  with fat layer exposed (H).     COMPARISON: X-ray from 6/26/2019.                                                                      IMPRESSION: No lytic or destructive lesions in the fourth toe to  suggest osteomyelitis. No significant change.     KAVON GILLESPIE MD

## 2019-07-31 ENCOUNTER — HOSPITAL ENCOUNTER (OUTPATIENT)
Dept: MRI IMAGING | Facility: CLINIC | Age: 59
Discharge: HOME | End: 2019-07-31
Attending: PODIATRIST | Admitting: PODIATRIST
Payer: COMMERCIAL

## 2019-07-31 DIAGNOSIS — M86.9 OSTEOMYELITIS OF FIFTH TOE OF RIGHT FOOT (H): ICD-10-CM

## 2019-07-31 DIAGNOSIS — L97.512: Primary | ICD-10-CM

## 2019-07-31 DIAGNOSIS — E11.42 TYPE 2 DIABETES MELLITUS WITH PERIPHERAL NEUROPATHY (H): ICD-10-CM

## 2019-07-31 DIAGNOSIS — L97.512 ULCER OF TOE, RIGHT, WITH FAT LAYER EXPOSED (H): ICD-10-CM

## 2019-07-31 PROCEDURE — A9585 GADOBUTROL INJECTION: HCPCS | Performed by: PODIATRIST

## 2019-07-31 PROCEDURE — 73720 MRI LWR EXTREMITY W/O&W/DYE: CPT | Mod: RT

## 2019-07-31 PROCEDURE — 25500064 ZZH RX 255 OP 636: Performed by: PODIATRIST

## 2019-07-31 RX ORDER — GADOBUTROL 604.72 MG/ML
10 INJECTION INTRAVENOUS ONCE
Status: COMPLETED | OUTPATIENT
Start: 2019-07-31 | End: 2019-07-31

## 2019-07-31 RX ADMIN — GADOBUTROL 10 ML: 604.72 INJECTION INTRAVENOUS at 13:54

## 2019-08-01 ENCOUNTER — TELEPHONE (OUTPATIENT)
Dept: PODIATRY | Facility: CLINIC | Age: 59
End: 2019-08-01

## 2019-08-01 ENCOUNTER — HOSPITAL ENCOUNTER (INPATIENT)
Facility: CLINIC | Age: 59
LOS: 2 days | Discharge: HOME | End: 2019-08-03
Attending: PODIATRIST | Admitting: INTERNAL MEDICINE
Payer: COMMERCIAL

## 2019-08-01 DIAGNOSIS — M86.171 OTHER ACUTE OSTEOMYELITIS OF RIGHT FOOT (H): Primary | ICD-10-CM

## 2019-08-01 PROBLEM — M86.9 OSTEOMYELITIS (H): Status: ACTIVE | Noted: 2019-08-01

## 2019-08-01 LAB
ANION GAP SERPL CALCULATED.3IONS-SCNC: 9 MMOL/L (ref 3–14)
BUN SERPL-MCNC: 17 MG/DL (ref 7–30)
CALCIUM SERPL-MCNC: 8.5 MG/DL (ref 8.5–10.1)
CHLORIDE SERPL-SCNC: 109 MMOL/L (ref 94–109)
CO2 SERPL-SCNC: 24 MMOL/L (ref 20–32)
CREAT SERPL-MCNC: 0.83 MG/DL (ref 0.66–1.25)
ERYTHROCYTE [DISTWIDTH] IN BLOOD BY AUTOMATED COUNT: 13.5 % (ref 10–15)
GFR SERPL CREATININE-BSD FRML MDRD: >90 ML/MIN/{1.73_M2}
GLUCOSE BLDC GLUCOMTR-MCNC: 27 MG/DL (ref 70–99)
GLUCOSE BLDC GLUCOMTR-MCNC: 46 MG/DL (ref 70–99)
GLUCOSE BLDC GLUCOMTR-MCNC: 55 MG/DL (ref 70–99)
GLUCOSE BLDC GLUCOMTR-MCNC: 63 MG/DL (ref 70–99)
GLUCOSE BLDC GLUCOMTR-MCNC: 64 MG/DL (ref 70–99)
GLUCOSE BLDC GLUCOMTR-MCNC: 66 MG/DL (ref 70–99)
GLUCOSE BLDC GLUCOMTR-MCNC: 75 MG/DL (ref 70–99)
GLUCOSE BLDC GLUCOMTR-MCNC: 91 MG/DL (ref 70–99)
GLUCOSE SERPL-MCNC: 101 MG/DL (ref 70–99)
HBA1C MFR BLD: 6.8 % (ref 0–5.6)
HCT VFR BLD AUTO: 37 % (ref 40–53)
HGB BLD-MCNC: 11.5 G/DL (ref 13.3–17.7)
MCH RBC QN AUTO: 29 PG (ref 26.5–33)
MCHC RBC AUTO-ENTMCNC: 31.1 G/DL (ref 31.5–36.5)
MCV RBC AUTO: 93 FL (ref 78–100)
PLATELET # BLD AUTO: 134 10E9/L (ref 150–450)
POTASSIUM SERPL-SCNC: 3.9 MMOL/L (ref 3.4–5.3)
RBC # BLD AUTO: 3.97 10E12/L (ref 4.4–5.9)
SODIUM SERPL-SCNC: 142 MMOL/L (ref 133–144)
WBC # BLD AUTO: 5.5 10E9/L (ref 4–11)

## 2019-08-01 PROCEDURE — 80048 BASIC METABOLIC PNL TOTAL CA: CPT | Performed by: INTERNAL MEDICINE

## 2019-08-01 PROCEDURE — 25000128 H RX IP 250 OP 636: Performed by: INTERNAL MEDICINE

## 2019-08-01 PROCEDURE — 99223 1ST HOSP IP/OBS HIGH 75: CPT | Mod: AI | Performed by: INTERNAL MEDICINE

## 2019-08-01 PROCEDURE — 12000000 ZZH R&B MED SURG/OB

## 2019-08-01 PROCEDURE — 00000146 ZZHCL STATISTIC GLUCOSE BY METER IP

## 2019-08-01 PROCEDURE — 85027 COMPLETE CBC AUTOMATED: CPT | Performed by: INTERNAL MEDICINE

## 2019-08-01 PROCEDURE — 83036 HEMOGLOBIN GLYCOSYLATED A1C: CPT | Performed by: INTERNAL MEDICINE

## 2019-08-01 PROCEDURE — 25000132 ZZH RX MED GY IP 250 OP 250 PS 637: Performed by: INTERNAL MEDICINE

## 2019-08-01 PROCEDURE — 36415 COLL VENOUS BLD VENIPUNCTURE: CPT | Performed by: INTERNAL MEDICINE

## 2019-08-01 RX ORDER — PROCHLORPERAZINE MALEATE 10 MG
10 TABLET ORAL EVERY 6 HOURS PRN
Status: DISCONTINUED | OUTPATIENT
Start: 2019-08-01 | End: 2019-08-03 | Stop reason: HOSPADM

## 2019-08-01 RX ORDER — GABAPENTIN 300 MG/1
300 CAPSULE ORAL 3 TIMES DAILY
Status: DISCONTINUED | OUTPATIENT
Start: 2019-08-01 | End: 2019-08-03 | Stop reason: HOSPADM

## 2019-08-01 RX ORDER — DEXTROSE MONOHYDRATE 25 G/50ML
25-50 INJECTION, SOLUTION INTRAVENOUS
Status: DISCONTINUED | OUTPATIENT
Start: 2019-08-01 | End: 2019-08-03 | Stop reason: HOSPADM

## 2019-08-01 RX ORDER — METOCLOPRAMIDE 5 MG/1
10 TABLET ORAL EVERY 6 HOURS PRN
Status: DISCONTINUED | OUTPATIENT
Start: 2019-08-01 | End: 2019-08-03 | Stop reason: HOSPADM

## 2019-08-01 RX ORDER — PROCHLORPERAZINE 25 MG
25 SUPPOSITORY, RECTAL RECTAL EVERY 12 HOURS PRN
Status: DISCONTINUED | OUTPATIENT
Start: 2019-08-01 | End: 2019-08-03 | Stop reason: HOSPADM

## 2019-08-01 RX ORDER — METOCLOPRAMIDE HYDROCHLORIDE 5 MG/ML
10 INJECTION INTRAMUSCULAR; INTRAVENOUS EVERY 6 HOURS PRN
Status: DISCONTINUED | OUTPATIENT
Start: 2019-08-01 | End: 2019-08-03 | Stop reason: HOSPADM

## 2019-08-01 RX ORDER — POTASSIUM CHLORIDE 29.8 MG/ML
20 INJECTION INTRAVENOUS
Status: DISCONTINUED | OUTPATIENT
Start: 2019-08-01 | End: 2019-08-03 | Stop reason: HOSPADM

## 2019-08-01 RX ORDER — LIDOCAINE 40 MG/G
CREAM TOPICAL
Status: DISCONTINUED | OUTPATIENT
Start: 2019-08-01 | End: 2019-08-03 | Stop reason: HOSPADM

## 2019-08-01 RX ORDER — DEXTROSE MONOHYDRATE 25 G/50ML
25-50 INJECTION, SOLUTION INTRAVENOUS
Status: DISCONTINUED | OUTPATIENT
Start: 2019-08-01 | End: 2019-08-01

## 2019-08-01 RX ORDER — ATORVASTATIN CALCIUM 40 MG/1
40 TABLET, FILM COATED ORAL DAILY
Status: DISCONTINUED | OUTPATIENT
Start: 2019-08-02 | End: 2019-08-03 | Stop reason: HOSPADM

## 2019-08-01 RX ORDER — MAGNESIUM SULFATE HEPTAHYDRATE 40 MG/ML
4 INJECTION, SOLUTION INTRAVENOUS EVERY 4 HOURS PRN
Status: DISCONTINUED | OUTPATIENT
Start: 2019-08-01 | End: 2019-08-03 | Stop reason: HOSPADM

## 2019-08-01 RX ORDER — LISINOPRIL 40 MG/1
40 TABLET ORAL DAILY
Status: DISCONTINUED | OUTPATIENT
Start: 2019-08-02 | End: 2019-08-03 | Stop reason: HOSPADM

## 2019-08-01 RX ORDER — AMLODIPINE BESYLATE 5 MG/1
5 TABLET ORAL DAILY
Status: DISCONTINUED | OUTPATIENT
Start: 2019-08-02 | End: 2019-08-03 | Stop reason: HOSPADM

## 2019-08-01 RX ORDER — NICOTINE POLACRILEX 4 MG
15-30 LOZENGE BUCCAL
Status: DISCONTINUED | OUTPATIENT
Start: 2019-08-01 | End: 2019-08-01

## 2019-08-01 RX ORDER — ONDANSETRON 4 MG/1
4 TABLET, ORALLY DISINTEGRATING ORAL EVERY 6 HOURS PRN
Status: DISCONTINUED | OUTPATIENT
Start: 2019-08-01 | End: 2019-08-03 | Stop reason: HOSPADM

## 2019-08-01 RX ORDER — NALOXONE HYDROCHLORIDE 0.4 MG/ML
.1-.4 INJECTION, SOLUTION INTRAMUSCULAR; INTRAVENOUS; SUBCUTANEOUS
Status: DISCONTINUED | OUTPATIENT
Start: 2019-08-01 | End: 2019-08-03 | Stop reason: HOSPADM

## 2019-08-01 RX ORDER — POTASSIUM CHLORIDE 1.5 G/1.58G
20-40 POWDER, FOR SOLUTION ORAL
Status: DISCONTINUED | OUTPATIENT
Start: 2019-08-01 | End: 2019-08-03 | Stop reason: HOSPADM

## 2019-08-01 RX ORDER — NICOTINE POLACRILEX 4 MG
15-30 LOZENGE BUCCAL
Status: DISCONTINUED | OUTPATIENT
Start: 2019-08-01 | End: 2019-08-03 | Stop reason: HOSPADM

## 2019-08-01 RX ORDER — POTASSIUM CHLORIDE 1500 MG/1
20-40 TABLET, EXTENDED RELEASE ORAL
Status: DISCONTINUED | OUTPATIENT
Start: 2019-08-01 | End: 2019-08-03 | Stop reason: HOSPADM

## 2019-08-01 RX ORDER — ONDANSETRON 2 MG/ML
4 INJECTION INTRAMUSCULAR; INTRAVENOUS EVERY 6 HOURS PRN
Status: DISCONTINUED | OUTPATIENT
Start: 2019-08-01 | End: 2019-08-03 | Stop reason: HOSPADM

## 2019-08-01 RX ORDER — POTASSIUM CL/LIDO/0.9 % NACL 10MEQ/0.1L
10 INTRAVENOUS SOLUTION, PIGGYBACK (ML) INTRAVENOUS
Status: DISCONTINUED | OUTPATIENT
Start: 2019-08-01 | End: 2019-08-03 | Stop reason: HOSPADM

## 2019-08-01 RX ORDER — POTASSIUM CHLORIDE 7.45 MG/ML
10 INJECTION INTRAVENOUS
Status: DISCONTINUED | OUTPATIENT
Start: 2019-08-01 | End: 2019-08-03 | Stop reason: HOSPADM

## 2019-08-01 RX ADMIN — TAZOBACTAM SODIUM AND PIPERACILLIN SODIUM 3.38 G: 375; 3 INJECTION, SOLUTION INTRAVENOUS at 20:46

## 2019-08-01 RX ADMIN — GABAPENTIN 300 MG: 300 CAPSULE ORAL at 20:28

## 2019-08-01 ASSESSMENT — ACTIVITIES OF DAILY LIVING (ADL): ADLS_ACUITY_SCORE: 11

## 2019-08-01 NOTE — PROGRESS NOTES
Pt would like blood glucose checked before each meal and insulin administered when eating or 15 before/after eating. In prior admission insulin had been given 2 hours after pt ate his meal and his sugar dropped very low. Please disregard pharmacy times on MAR, he will call when he orders for a sugar check and will call when food arrives for insulin.

## 2019-08-01 NOTE — PHARMACY-ADMISSION MEDICATION HISTORY
Admission medication history interview status for this patient is complete. See [facility] admission navigator for allergy information, prior to admission medications and immunization status.     Medication history interview source(s):Patient  Medication history resources (including written lists, pill bottles, clinic record):None    Changes made to PTA medication list:  Added: none  Deleted: none  Changed: none    See medrec from 7-7-2019 for insulin questionaire    Actions taken by pharmacist (provider contacted, etc):None     Additional medication history information: patient insist that he eats right after he takes his humalog    Medication reconciliation/reorder completed by provider prior to medication history? No        Prior to Admission medications    Medication Sig Last Dose Taking? Auth Provider   amLODIPine (NORVASC) 5 MG tablet Take 1 tablet (5 mg) by mouth daily 8/1/2019 at am Yes Aden Lopez MD   atorvastatin (LIPITOR) 40 MG tablet TAKE 1 TABLET BY MOUTH EVERY DAY 8/1/2019 at am Yes Aden Lopez MD   Cholecalciferol (VITAMIN D3 PO) Take 1,000 Units by mouth daily  8/1/2019 at am Yes Reported, Patient   CIALIS 5 MG tablet TAKE 1 TABLET BY MOUTH EVERY DAY AS NEEDED  Yes Aden Lopez MD   Co-Enzyme Q10 100 MG CAPS Take 100 mg by mouth daily  Past Week at Unknown time Yes Aden Lopez MD   gabapentin (NEURONTIN) 100 MG capsule Take 300 mg by mouth 3 times daily 8/1/2019 at x1 Yes Unknown, Entered By History   insulin degludec (TRESIBA FLEXTOUCH) 100 UNIT/ML pen Inject 55 Units Subcutaneous daily 8/1/2019 at am Yes Jasmin Arteaga APRN CNP   insulin lispro (HUMALOG PEN) 100 UNIT/ML pen Inject 30 Units Subcutaneous 3 times daily (before meals) Breakfast and Dinner 8/1/2019 at 1330 Yes Jasmin Arteaga APRN CNP   ketoconazole (NIZORAL) 2 % external shampoo Apply topically daily as needed , apply daily and wash off after 5 min  Yes Unknown, Entered By History    lisinopril (PRINIVIL/ZESTRIL) 40 MG tablet Take 1 tablet (40 mg) by mouth daily 8/1/2019 at am Yes Aden Lopez MD   metFORMIN (GLUCOPHAGE-XR) 500 MG 24 hr tablet TAKE 2 TABLETS BY MOUTH TWICE DAILY WITH MEALS 8/1/2019 at x2 Yes Jasmin Arteaga APRN CNP   Multiple Vitamins-Minerals (MULTIVITAMIN PO) Take 1 tablet by mouth daily  8/1/2019 at am Yes Reported, Patient   Omega-3 Fatty Acids (OMEGA-3 FISH OIL PO) Take 1 g by mouth 2 times daily (with meals)  Past Week at Unknown time Yes Reported, Patient   semaglutide (OZEMPIC) 1 MG/DOSE pen Inject 1 mg Subcutaneous every 7 days , on Wednesdays 7/31/2019 at Unknown time Yes Jasmin Arteaga APRN CNP   blood glucose (NO BRAND SPECIFIED) lancets standard Use to test blood sugar 3 times daily or as directed.   Jasmin Arteaga APRN CNP   blood glucose monitoring (NO BRAND SPECIFIED) meter device kit Use to test blood sugar 3 times daily or as directed.   Aden Lopez MD   blood glucose monitoring (NO BRAND SPECIFIED) test strip Use to test blood sugars 3 times daily or as directed   Jasmin Arteaga APRN CNP   Continuous Blood Gluc Sensor (FREESTYLE LUCERO 14 DAY SENSOR) MISC 1 each every 14 days   Jasmin Arteaga APRN CNP   Insulin Pen Needle (PEN NEEDLES) 31G X 5 MM MISC 1 Device 5 times daily , TWICE DAILY WITH LANTUS AND 3 TIMES A DAY PRN WITH NOVOLOG FLEXPEN   Jasmin Arteaga APRN CNP

## 2019-08-01 NOTE — TELEPHONE ENCOUNTER
Reason for Call: Request for an order or referral:    Order being requested: amputation    Date needed: before my next appointment 8.2.19    Has the patient been seen by the PCP for this problem? YES    Additional comments: looking for orders for a direct admit. Is in the hospital now but no orders placed for anything - meds, amputation tomorrow, etc    Phone number Lennie can be reached at:  [phone]    Best Time:  any    Can we leave a detailed message on this number?  YES    Call taken on 8/1/2019 at 6:00 PM by Patt Patel

## 2019-08-01 NOTE — PROGRESS NOTES
No orders in system. Pt here in room. Called and spoke with Dr. Younger's office to get orders.  said Md gone for the day and does not work after hours. She will try to get a hold of him and call back. Staff expressed that if anything we need an order for a hospitalist consult since pt was a direct admit and did not enter through ER.

## 2019-08-01 NOTE — PROVIDER NOTIFICATION
Text paged PA attending hosp told me to page you we have a direct admit with no orders who is hypoglycemic BG 27. is here for podiatry surgery tomorrow. please help. thank you

## 2019-08-01 NOTE — PLAN OF CARE
Pt admitted directly from podiatry office. HTN.Bg initially 27 given juice then 46, ate food 67. Left foot 2nd toe dislocated, causes him pain when ambulating. Previous amputations. Nonhealing wound on 2nd toe on right foot- MRI indicated osteomyolitis per patient. Scheduled for surgery tomorrow for amputation. Oriented to room, call light, IS. Up independently.

## 2019-08-02 ENCOUNTER — ANESTHESIA EVENT (OUTPATIENT)
Dept: SURGERY | Facility: CLINIC | Age: 59
End: 2019-08-02
Payer: COMMERCIAL

## 2019-08-02 ENCOUNTER — APPOINTMENT (OUTPATIENT)
Dept: GENERAL RADIOLOGY | Facility: CLINIC | Age: 59
End: 2019-08-02
Attending: PODIATRIST
Payer: COMMERCIAL

## 2019-08-02 ENCOUNTER — ANESTHESIA (OUTPATIENT)
Dept: SURGERY | Facility: CLINIC | Age: 59
End: 2019-08-02
Payer: COMMERCIAL

## 2019-08-02 LAB
ANION GAP SERPL CALCULATED.3IONS-SCNC: 6 MMOL/L (ref 3–14)
BUN SERPL-MCNC: 15 MG/DL (ref 7–30)
CALCIUM SERPL-MCNC: 8.8 MG/DL (ref 8.5–10.1)
CHLORIDE SERPL-SCNC: 111 MMOL/L (ref 94–109)
CO2 SERPL-SCNC: 27 MMOL/L (ref 20–32)
CREAT SERPL-MCNC: 0.9 MG/DL (ref 0.66–1.25)
ERYTHROCYTE [DISTWIDTH] IN BLOOD BY AUTOMATED COUNT: 13.5 % (ref 10–15)
GFR SERPL CREATININE-BSD FRML MDRD: >90 ML/MIN/{1.73_M2}
GLUCOSE BLDC GLUCOMTR-MCNC: 100 MG/DL (ref 70–99)
GLUCOSE BLDC GLUCOMTR-MCNC: 101 MG/DL (ref 70–99)
GLUCOSE BLDC GLUCOMTR-MCNC: 105 MG/DL (ref 70–99)
GLUCOSE BLDC GLUCOMTR-MCNC: 115 MG/DL (ref 70–99)
GLUCOSE BLDC GLUCOMTR-MCNC: 121 MG/DL (ref 70–99)
GLUCOSE BLDC GLUCOMTR-MCNC: 192 MG/DL (ref 70–99)
GLUCOSE BLDC GLUCOMTR-MCNC: 216 MG/DL (ref 70–99)
GLUCOSE BLDC GLUCOMTR-MCNC: 70 MG/DL (ref 70–99)
GLUCOSE BLDC GLUCOMTR-MCNC: 80 MG/DL (ref 70–99)
GLUCOSE BLDC GLUCOMTR-MCNC: 98 MG/DL (ref 70–99)
GLUCOSE SERPL-MCNC: 86 MG/DL (ref 70–99)
HCT VFR BLD AUTO: 39.1 % (ref 40–53)
HGB BLD-MCNC: 12 G/DL (ref 13.3–17.7)
MAGNESIUM SERPL-MCNC: 2.1 MG/DL (ref 1.6–2.3)
MCH RBC QN AUTO: 28.4 PG (ref 26.5–33)
MCHC RBC AUTO-ENTMCNC: 30.7 G/DL (ref 31.5–36.5)
MCV RBC AUTO: 93 FL (ref 78–100)
PLATELET # BLD AUTO: 116 10E9/L (ref 150–450)
POTASSIUM SERPL-SCNC: 4.7 MMOL/L (ref 3.4–5.3)
RBC # BLD AUTO: 4.22 10E12/L (ref 4.4–5.9)
SODIUM SERPL-SCNC: 144 MMOL/L (ref 133–144)
WBC # BLD AUTO: 5 10E9/L (ref 4–11)

## 2019-08-02 PROCEDURE — 36415 COLL VENOUS BLD VENIPUNCTURE: CPT | Performed by: INTERNAL MEDICINE

## 2019-08-02 PROCEDURE — 37000009 ZZH ANESTHESIA TECHNICAL FEE, EACH ADDTL 15 MIN: Performed by: PODIATRIST

## 2019-08-02 PROCEDURE — 99232 SBSQ HOSP IP/OBS MODERATE 35: CPT | Performed by: INTERNAL MEDICINE

## 2019-08-02 PROCEDURE — 25000125 ZZHC RX 250: Performed by: PODIATRIST

## 2019-08-02 PROCEDURE — 85027 COMPLETE CBC AUTOMATED: CPT | Performed by: INTERNAL MEDICINE

## 2019-08-02 PROCEDURE — 80048 BASIC METABOLIC PNL TOTAL CA: CPT | Performed by: INTERNAL MEDICINE

## 2019-08-02 PROCEDURE — 37000008 ZZH ANESTHESIA TECHNICAL FEE, 1ST 30 MIN: Performed by: PODIATRIST

## 2019-08-02 PROCEDURE — 25000125 ZZHC RX 250: Performed by: NURSE ANESTHETIST, CERTIFIED REGISTERED

## 2019-08-02 PROCEDURE — 88305 TISSUE EXAM BY PATHOLOGIST: CPT | Mod: 26 | Performed by: PODIATRIST

## 2019-08-02 PROCEDURE — 87077 CULTURE AEROBIC IDENTIFY: CPT | Performed by: PODIATRIST

## 2019-08-02 PROCEDURE — 36000050 ZZH SURGERY LEVEL 2 1ST 30 MIN: Performed by: PODIATRIST

## 2019-08-02 PROCEDURE — 87075 CULTR BACTERIA EXCEPT BLOOD: CPT | Performed by: PODIATRIST

## 2019-08-02 PROCEDURE — 25000128 H RX IP 250 OP 636: Performed by: INTERNAL MEDICINE

## 2019-08-02 PROCEDURE — 99207 ZZC PREOP VISIT IN GLOBAL PKG: CPT | Performed by: PODIATRIST

## 2019-08-02 PROCEDURE — 12000000 ZZH R&B MED SURG/OB

## 2019-08-02 PROCEDURE — 27210794 ZZH OR GENERAL SUPPLY STERILE: Performed by: PODIATRIST

## 2019-08-02 PROCEDURE — 0Y6V0Z1 DETACHMENT AT RIGHT 4TH TOE, HIGH, OPEN APPROACH: ICD-10-PCS | Performed by: PODIATRIST

## 2019-08-02 PROCEDURE — 71000027 ZZH RECOVERY PHASE 2 EACH 15 MINS: Performed by: PODIATRIST

## 2019-08-02 PROCEDURE — 25000128 H RX IP 250 OP 636: Performed by: NURSE ANESTHETIST, CERTIFIED REGISTERED

## 2019-08-02 PROCEDURE — 00000146 ZZHCL STATISTIC GLUCOSE BY METER IP

## 2019-08-02 PROCEDURE — 88311 DECALCIFY TISSUE: CPT | Mod: 26 | Performed by: PODIATRIST

## 2019-08-02 PROCEDURE — 25000132 ZZH RX MED GY IP 250 OP 250 PS 637: Performed by: INTERNAL MEDICINE

## 2019-08-02 PROCEDURE — 40000986 XR FOOT PORT RT 2 VW: Mod: RT

## 2019-08-02 PROCEDURE — 40000306 ZZH STATISTIC PRE PROC ASSESS II: Performed by: PODIATRIST

## 2019-08-02 PROCEDURE — 25800030 ZZH RX IP 258 OP 636: Performed by: ANESTHESIOLOGY

## 2019-08-02 PROCEDURE — 25000128 H RX IP 250 OP 636: Performed by: PODIATRIST

## 2019-08-02 PROCEDURE — 87070 CULTURE OTHR SPECIMN AEROBIC: CPT | Performed by: PODIATRIST

## 2019-08-02 PROCEDURE — 83735 ASSAY OF MAGNESIUM: CPT | Performed by: INTERNAL MEDICINE

## 2019-08-02 PROCEDURE — 25800025 ZZH RX 258: Performed by: INTERNAL MEDICINE

## 2019-08-02 PROCEDURE — 28825 PARTIAL AMPUTATION OF TOE: CPT | Mod: T8 | Performed by: PODIATRIST

## 2019-08-02 PROCEDURE — 88305 TISSUE EXAM BY PATHOLOGIST: CPT | Performed by: PODIATRIST

## 2019-08-02 PROCEDURE — 87186 SC STD MICRODIL/AGAR DIL: CPT | Performed by: PODIATRIST

## 2019-08-02 PROCEDURE — 88311 DECALCIFY TISSUE: CPT | Performed by: PODIATRIST

## 2019-08-02 PROCEDURE — 36000052 ZZH SURGERY LEVEL 2 EA 15 ADDTL MIN: Performed by: PODIATRIST

## 2019-08-02 PROCEDURE — 25000131 ZZH RX MED GY IP 250 OP 636 PS 637: Performed by: INTERNAL MEDICINE

## 2019-08-02 RX ORDER — FENTANYL CITRATE 50 UG/ML
25-50 INJECTION, SOLUTION INTRAMUSCULAR; INTRAVENOUS
Status: DISCONTINUED | OUTPATIENT
Start: 2019-08-02 | End: 2019-08-02 | Stop reason: HOSPADM

## 2019-08-02 RX ORDER — SODIUM CHLORIDE, SODIUM LACTATE, POTASSIUM CHLORIDE, CALCIUM CHLORIDE 600; 310; 30; 20 MG/100ML; MG/100ML; MG/100ML; MG/100ML
INJECTION, SOLUTION INTRAVENOUS CONTINUOUS
Status: DISCONTINUED | OUTPATIENT
Start: 2019-08-02 | End: 2019-08-02 | Stop reason: HOSPADM

## 2019-08-02 RX ORDER — GLYCOPYRROLATE 0.2 MG/ML
INJECTION, SOLUTION INTRAMUSCULAR; INTRAVENOUS PRN
Status: DISCONTINUED | OUTPATIENT
Start: 2019-08-02 | End: 2019-08-02

## 2019-08-02 RX ORDER — MAGNESIUM HYDROXIDE 1200 MG/15ML
LIQUID ORAL PRN
Status: DISCONTINUED | OUTPATIENT
Start: 2019-08-02 | End: 2019-08-02 | Stop reason: HOSPADM

## 2019-08-02 RX ORDER — ONDANSETRON 4 MG/1
4 TABLET, ORALLY DISINTEGRATING ORAL EVERY 30 MIN PRN
Status: DISCONTINUED | OUTPATIENT
Start: 2019-08-02 | End: 2019-08-02 | Stop reason: HOSPADM

## 2019-08-02 RX ORDER — FENTANYL CITRATE 50 UG/ML
INJECTION, SOLUTION INTRAMUSCULAR; INTRAVENOUS PRN
Status: DISCONTINUED | OUTPATIENT
Start: 2019-08-02 | End: 2019-08-02

## 2019-08-02 RX ORDER — ONDANSETRON 2 MG/ML
4 INJECTION INTRAMUSCULAR; INTRAVENOUS EVERY 30 MIN PRN
Status: DISCONTINUED | OUTPATIENT
Start: 2019-08-02 | End: 2019-08-02 | Stop reason: HOSPADM

## 2019-08-02 RX ORDER — HYDROMORPHONE HYDROCHLORIDE 1 MG/ML
.3-.5 INJECTION, SOLUTION INTRAMUSCULAR; INTRAVENOUS; SUBCUTANEOUS EVERY 5 MIN PRN
Status: DISCONTINUED | OUTPATIENT
Start: 2019-08-02 | End: 2019-08-02 | Stop reason: HOSPADM

## 2019-08-02 RX ORDER — PROPOFOL 10 MG/ML
INJECTION, EMULSION INTRAVENOUS CONTINUOUS PRN
Status: DISCONTINUED | OUTPATIENT
Start: 2019-08-02 | End: 2019-08-02

## 2019-08-02 RX ORDER — ONDANSETRON 2 MG/ML
INJECTION INTRAMUSCULAR; INTRAVENOUS PRN
Status: DISCONTINUED | OUTPATIENT
Start: 2019-08-02 | End: 2019-08-02

## 2019-08-02 RX ORDER — ACETAMINOPHEN 325 MG/1
975 TABLET ORAL ONCE
Status: DISCONTINUED | OUTPATIENT
Start: 2019-08-02 | End: 2019-08-02 | Stop reason: HOSPADM

## 2019-08-02 RX ORDER — LIDOCAINE 40 MG/G
CREAM TOPICAL
Status: DISCONTINUED | OUTPATIENT
Start: 2019-08-02 | End: 2019-08-02 | Stop reason: HOSPADM

## 2019-08-02 RX ORDER — BUPIVACAINE HYDROCHLORIDE 5 MG/ML
INJECTION, SOLUTION EPIDURAL; INTRACAUDAL PRN
Status: DISCONTINUED | OUTPATIENT
Start: 2019-08-02 | End: 2019-08-02 | Stop reason: HOSPADM

## 2019-08-02 RX ORDER — NALOXONE HYDROCHLORIDE 0.4 MG/ML
.1-.4 INJECTION, SOLUTION INTRAMUSCULAR; INTRAVENOUS; SUBCUTANEOUS
Status: DISCONTINUED | OUTPATIENT
Start: 2019-08-02 | End: 2019-08-03 | Stop reason: HOSPADM

## 2019-08-02 RX ADMIN — TAZOBACTAM SODIUM AND PIPERACILLIN SODIUM 3.38 G: 375; 3 INJECTION, SOLUTION INTRAVENOUS at 08:01

## 2019-08-02 RX ADMIN — INSULIN ASPART 10 UNITS: 100 INJECTION, SOLUTION INTRAVENOUS; SUBCUTANEOUS at 20:48

## 2019-08-02 RX ADMIN — FENTANYL CITRATE 100 MCG: 50 INJECTION, SOLUTION INTRAMUSCULAR; INTRAVENOUS at 16:56

## 2019-08-02 RX ADMIN — TAZOBACTAM SODIUM AND PIPERACILLIN SODIUM 3.38 G: 375; 3 INJECTION, SOLUTION INTRAVENOUS at 14:37

## 2019-08-02 RX ADMIN — GABAPENTIN 300 MG: 300 CAPSULE ORAL at 14:37

## 2019-08-02 RX ADMIN — PROPOFOL 100 MCG/KG/MIN: 10 INJECTION, EMULSION INTRAVENOUS at 16:56

## 2019-08-02 RX ADMIN — MIDAZOLAM 2 MG: 1 INJECTION INTRAMUSCULAR; INTRAVENOUS at 16:54

## 2019-08-02 RX ADMIN — SODIUM CHLORIDE, POTASSIUM CHLORIDE, SODIUM LACTATE AND CALCIUM CHLORIDE: 600; 310; 30; 20 INJECTION, SOLUTION INTRAVENOUS at 16:54

## 2019-08-02 RX ADMIN — ONDANSETRON 4 MG: 2 INJECTION INTRAMUSCULAR; INTRAVENOUS at 16:56

## 2019-08-02 RX ADMIN — GLYCOPYRROLATE 0.2 MG: 0.2 INJECTION, SOLUTION INTRAMUSCULAR; INTRAVENOUS at 16:56

## 2019-08-02 RX ADMIN — AMLODIPINE BESYLATE 5 MG: 5 TABLET ORAL at 08:01

## 2019-08-02 RX ADMIN — TAZOBACTAM SODIUM AND PIPERACILLIN SODIUM 3.38 G: 375; 3 INJECTION, SOLUTION INTRAVENOUS at 02:11

## 2019-08-02 RX ADMIN — LISINOPRIL 40 MG: 40 TABLET ORAL at 08:01

## 2019-08-02 RX ADMIN — DEXTROSE AND SODIUM CHLORIDE: 5; 450 INJECTION, SOLUTION INTRAVENOUS at 10:43

## 2019-08-02 RX ADMIN — ATORVASTATIN CALCIUM 40 MG: 40 TABLET, FILM COATED ORAL at 08:01

## 2019-08-02 RX ADMIN — GABAPENTIN 300 MG: 300 CAPSULE ORAL at 08:01

## 2019-08-02 RX ADMIN — GABAPENTIN 300 MG: 300 CAPSULE ORAL at 20:28

## 2019-08-02 RX ADMIN — TAZOBACTAM SODIUM AND PIPERACILLIN SODIUM 3.38 G: 375; 3 INJECTION, SOLUTION INTRAVENOUS at 20:28

## 2019-08-02 ASSESSMENT — ACTIVITIES OF DAILY LIVING (ADL)
ADLS_ACUITY_SCORE: 11

## 2019-08-02 NOTE — CONSULTS
[facility]/PODIATRY CONSULTATION  August 2, 2019      ASSESSMENT:   R 4th toe osteomyelitis  DM II with neuropathy  Hx of toe amputations     PLAN:  Reviewed patient's chart in epic.  Discussed condition and treatment options including pros and cons.    -Advised R 4th toe amputation for treatment of osteomyelitis.  Pt agreed.  Scheduled for this afternoon.  -Potential risks associated with surgery include but are not limited to infection, continued open wound at the surgical site, nonhealing wound, ulceration, bleeding, repeat surgery or amputation and blood clot.    -IV abx per hospitalist.      Jack Younger DPM, FACFAS  Pager: [phone]      PATIENT HISTORY:  Crispin Rojas is a 59 year old male who was admitted for R 4th toe osteomyelitis, MRI indicated.  Pt reports wound on toe for several weeks.  Followed by Dr Molina.  Wound care has failed to heal this.      I was requested to see this patient for this issue by Dr Molina.    Review of Systems:  Patient denies f/c/n/v.  Rest of 10 pt ROS neg except for HPI.    PAST MEDICAL HISTORY:   Past Medical History:   Diagnosis Date     Cerebral infarction (H)      Chronic infection      H/O heartburn      History of heartburn      Hypertension      Numbness and tingling      Obesity      GILA (obstructive sleep apnea) 10/22/2018     Renal stone      Type II or unspecified type diabetes mellitus without mention of complication, not stated as uncontrolled         PAST SURGICAL HISTORY:   Past Surgical History:   Procedure Laterality Date     AMPUTATE FOOT Left 8/18/2016    Procedure: AMPUTATE FOOT;  Surgeon: Jack Younger DPM;  Location: [facility] OR     AMPUTATE TOE(S) Right 2/1/2016    Procedure: AMPUTATE TOE(S);  Surgeon: Rachelle Manriquez DPM, Pod;  Location: [facility] OR     ANGIOGRAM       COLONOSCOPY       COMBINED CYSTOSCOPY, RETROGRADES, URETEROSCOPY, INSERT STENT Left 8/21/2016    Procedure: COMBINED CYSTOSCOPY, RETROGRADES, URETEROSCOPY,  INSERT STENT;  Surgeon: Artemio Valenzuela MD;  Location: [facility] OR     COMBINED CYSTOSCOPY, RETROGRADES, URETEROSCOPY, LASER HOLMIUM LITHOTRIPSY URETER(S), INSERT STENT Left 10/3/2016    Procedure: COMBINED CYSTOSCOPY, RETROGRADES, URETEROSCOPY, LASER HOLMIUM LITHOTRIPSY URETER(S), INSERT STENT;  Surgeon: Artemio Valenzuela MD;  Location: [facility] OR     EXTRACORPOREAL SHOCK WAVE LITHOTRIPSY (ESWL) Left 9/8/2016    Procedure: EXTRACORPOREAL SHOCK WAVE LITHOTRIPSY (ESWL);  Surgeon: Artemio Valenzuela MD;  Location: [facility] OR     RECESSION GASTROCNEMIUS Right 2/1/2016    Procedure: RECESSION GASTROCNEMIUS;  Surgeon: Rachelle Manriquez DPM, Pod;  Location: [facility] OR        MEDICATIONS:   Current Facility-Administered Medications:      amLODIPine (NORVASC) tablet 5 mg, 5 mg, Oral, Daily, Jose De Jesus Molina MD, 5 mg at 08/02/19 0801     atorvastatin (LIPITOR) tablet 40 mg, 40 mg, Oral, Daily, Jose De Jesus Molina MD, 40 mg at 08/02/19 0801     dextrose 5% and 0.45% NaCl infusion, , Intravenous, Continuous, Tino Castellon MD, Last Rate: 50 mL/hr at 08/02/19 1439     glucose gel 15-30 g, 15-30 g, Oral, Q15 Min PRN **OR** dextrose 50 % injection 25-50 mL, 25-50 mL, Intravenous, Q15 Min PRN **OR** glucagon injection 1 mg, 1 mg, Subcutaneous, Q15 Min PRN, Jose De Jesus Molina MD     gabapentin (NEURONTIN) capsule 300 mg, 300 mg, Oral, TID, Jose De Jesus Molina MD, 300 mg at 08/02/19 1437     insulin aspart (NovoLOG) inj (RAPID ACTING), 0-9 Units, Subcutaneous, Q4H, Tino Castellon MD     [START ON 8/3/2019] insulin glargine (LANTUS) injection 30 Units, 30 Units, Subcutaneous, QAM AC, Tino Castellon MD     lidocaine (LMX4) cream, , Topical, Q1H PRN, Jose De Jesus Molina MD     lidocaine 1 % 0.1-1 mL, 0.1-1 mL, Other, Q1H PRN, Jose De Jesus Molina MD     lisinopril (PRINIVIL/ZESTRIL) tablet 40 mg, 40 mg, Oral, Daily, Jose De Jesus Molina MD, 40 mg at 08/02/19 0801     magnesium sulfate 4 g in 100 mL sterile water (premade), 4 g,  Intravenous, Q4H PRN, Jose De Jesus Molina MD     melatonin tablet 1 mg, 1 mg, Oral, At Bedtime PRN, Jose De Jesus Molina MD     metoclopramide (REGLAN) tablet 10 mg, 10 mg, Oral, Q6H PRN **OR** metoclopramide (REGLAN) injection 10 mg, 10 mg, Intravenous, Q6H PRN, Jose De Jesus oMlina MD     naloxone (NARCAN) injection 0.1-0.4 mg, 0.1-0.4 mg, Intravenous, Q2 Min PRN, Jose De Jesus Molina MD     ondansetron (ZOFRAN-ODT) ODT tab 4 mg, 4 mg, Oral, Q6H PRN **OR** ondansetron (ZOFRAN) injection 4 mg, 4 mg, Intravenous, Q6H PRN, Jose De Jesus Molina MD     Patient RECEIVING antibiotic to treat a different condition and it provides ADEQUATE COVERAGE for this surgical procedure., 1 each, As instructed, Continuous, Jack Younger DPM     piperacillin-tazobactam (ZOSYN) infusion 3.375 g, 3.375 g, Intravenous, Q6H, Jose De Jesus Molina MD, Last Rate: 100 mL/hr at 08/02/19 1437, 3.375 g at 08/02/19 1437     potassium chloride (KLOR-CON) Packet 20-40 mEq, 20-40 mEq, Oral or Feeding Tube, Q2H PRN, Jose De Jesus Molina MD     potassium chloride 10 mEq in 100 mL intermittent infusion with 10 mg lidocaine, 10 mEq, Intravenous, Q1H PRN, Jose De Jesus Molina MD     potassium chloride 10 mEq in 100 mL sterile water intermittent infusion (premix), 10 mEq, Intravenous, Q1H PRN, Jose De Jesus Molina MD     potassium chloride 20 mEq in 50 mL intermittent infusion, 20 mEq, Intravenous, Q1H PRN, Jose De Jesus Molina MD     potassium chloride ER (K-DUR/KLOR-CON M) CR tablet 20-40 mEq, 20-40 mEq, Oral, Q2H PRN, Jose De Jesus Molina MD     prochlorperazine (COMPAZINE) injection 10 mg, 10 mg, Intravenous, Q6H PRN **OR** prochlorperazine (COMPAZINE) tablet 10 mg, 10 mg, Oral, Q6H PRN **OR** prochlorperazine (COMPAZINE) Suppository 25 mg, 25 mg, Rectal, Q12H PRN, Jose De Jesus Molina MD     sodium chloride (PF) 0.9% PF flush 3 mL, 3 mL, Intracatheter, q1 min prn, Jose De Jesus Molina MD     sodium chloride (PF) 0.9% PF flush 3 mL, 3 mL, Intracatheter, Q8H, Tracy,  Jose De Jesus CASILLAS MD, 3 mL at 08/02/19 1039     ALLERGIES:    Allergies   Allergen Reactions     Niacin Swelling     SIMCOR caused edema in extremities     Simvastatin Swelling     SIMCOR caused edema in extremities        SOCIAL HISTORY:   Social History     Socioeconomic History     Marital status:      Spouse name: Sydney     Number of children: 2     Years of education: Not on file     Highest education level: Not on file   Occupational History     Occupation: marketing     Employer: [employer]     Comment: [employer]   Social Needs     Financial resource strain: Not on file     Food insecurity:     Worry: Not on file     Inability: Not on file     Transportation needs:     Medical: Not on file     Non-medical: Not on file   Tobacco Use     Smoking status: Never Smoker     Smokeless tobacco: Never Used   Substance and Sexual Activity     Alcohol use: Yes     Alcohol/week: 0.0 oz     Comment: rare---red wine 2x per week     Drug use: No     Sexual activity: Yes     Partners: Female   Lifestyle     Physical activity:     Days per week: Not on file     Minutes per session: Not on file     Stress: Not on file   Relationships     Social connections:     Talks on phone: Not on file     Gets together: Not on file     Attends Faith service: Not on file     Active member of club or organization: Not on file     Attends meetings of clubs or organizations: Not on file     Relationship status: Not on file     Intimate partner violence:     Fear of current or ex partner: Not on file     Emotionally abused: Not on file     Physically abused: Not on file     Forced sexual activity: Not on file   Other Topics Concern     Parent/sibling w/ CABG, MI or angioplasty before 65F 55M? No   Social History Narrative     Not on file        FAMILY HISTORY:   Family History   Problem Relation Age of Onset     Arthritis Mother         SLE     Connective Tissue Disorder Mother         lupus     Diabetes Mother      Cerebrovascular Disease  Mother      Cancer Mother      Diabetes Father      Diabetes Maternal Grandfather      Diabetes Sister         EXAM:Vitals: BP (!) 177/68 (BP Location: Right arm)   Pulse 54   Temp 96.2  F (35.7  C) (Oral)   Resp 16   Wt 108.6 kg (239 lb 6.4 oz)   SpO2 100%   BMI 32.92 kg/m    BMI= Body mass index is 32.92 kg/m .    General appearance: Patient is alert and fully cooperative with history & exam.  No sign of distress is noted during the visit.     Psychiatric: Affect is pleasant & appropriate.  Patient appears motivated to improve health.     Respiratory: Breathing is regular & unlabored while sitting.     HEENT: Hearing is intact to spoken word.  Speech is clear.  No gross evidence of visual impairment that would impact ambulation.     Dermatologic: R dorsolateral 4th toe with ulceration with exposed fat layer, 0.5 x 0.5cm. There is edema and mild erythema of the distal toe.     Vascular: DP & PT pulses are intact & regular bilaterally.  No significant edema or varicosities noted.  CFT and skin temperature are normal to both lower extremities.     Neurologic: Lower extremity sensation is diminished to light touch b/l.     Musculoskeletal: Patient is ambulatory without assistive device or brace.  No gross ankle deformity noted.  No foot or ankle joint effusion is noted.     Lab Results   Component Value Date    WBC 5.0 08/02/2019     Lab Results   Component Value Date    RBC 4.22 08/02/2019     Lab Results   Component Value Date    HGB 12.0 08/02/2019     Lab Results   Component Value Date    HCT 39.1 08/02/2019     No components found for: MCT  Lab Results   Component Value Date    MCV 93 08/02/2019     Lab Results   Component Value Date    MCH 28.4 08/02/2019     Lab Results   Component Value Date    MCHC 30.7 08/02/2019     Lab Results   Component Value Date    RDW 13.5 08/02/2019     Lab Results   Component Value Date     08/02/2019         MR reviewed with pt:    IMPRESSION:  1. Shallow ulcer dorsal  fourth toe with underlying prominent bone  marrow edema and contrast-enhancement in the middle and distal phalanx  suspicious for osteomyelitis.  2. No evidence for soft tissue abscess.  3. Interval amputation of the distal great toe.  4. Interval resolution of previously seen abnormal signal at the base  of the proximal phalanx of the fifth toe. This may have been a  fracture which has healed.     ANA LARKIN MD

## 2019-08-02 NOTE — PROGRESS NOTES
SPIRITUAL HEALTH SERVICES  [facility]  PRE-SURGERY VISIT    Pre-surgical visit with pt.  Provided spiritual support, prayer and Christianity communion.   Oriented to Spiritual Health Services and availability for emotional/spiritual support during hospital stay.     Beto Campos MA  Staff   Pager: [phone]  Phone: [phone]

## 2019-08-02 NOTE — CONSULTS
CM: pt admitted with an Elevated JENNIFER. Pt has had now 3 admission in the past month. No gap in care identified at this time as pt follows in diabetic educator, podiatry and PCP as needed and recommended.     RN CC will continue to follow for discharge needs.    Mis Kirkpatrick RN, BSN CTS  Care Coordinator  [phone]

## 2019-08-02 NOTE — PROGRESS NOTES
[facility]  Hospitalist Progress Note  Patient Name: Crispin Rojas    MRN: [id number]  Provider: Tino Castellon MD  08/02/19    Initial presenting complaint/issue to hospital (Diagnosis): osteomyelitis         Assessment and Plan:      Summary:  Crispin Rojas is a 59 year old male with history of insulin requiring type 2 diabetes, hypertension, previous CVA, and obstructive sleep apnea. He was sent for direct admission from the Podiatry clinic with osteomyelitis of his right fourth toe. He has had previous toe amputations on his left foot. He had a chronic left foot wound which healed.  He developed a wound on his right 4th toe for which he was seen by podiatry on Monday 7/29. MRI was obtained and showed osteomyelitis of the right fourth toe. Direct admission was arranged on 8/1/19 in anticipation of amputation on 8/2/19. He was admitted as an inpatient and treated with Zosyn.       Problem list:     1.  Right fourth toe osteomyelitis due to diabeteic foot infection. Continue Zosyn. To the OR today for amputation.      2.  Type 2 diabetes, insulin requiring. Crispin controls his blood sugars very tightly- in the 70-90 range. He normally takes 55 units of Tresiba at 3 AM every day and uses variable doses of lispro based on what he eats.  He is also on metformin daily. His blood sugar was low (27)  when he arrived for admission .  He was asymptomatic and was treated with juice and ate food which improved his blood sugar to 75 later. He has been NPO. Since he has been on Tresiba with such a long half life and his blood sugar was 80 this morning I started him on some D5 1/2 NS. His long acting insulin has been held. Novolog sliding scale insulin has been ordered. Crispin is agreeable to using Lantus insulin instead of Tresiba while here- at least initially. He wants to use a more aggressive sliding scale, however. I have ordered Lantus insulin 30 units each morning to start tomorrow. I have ordered  a custom Novolog sliding scale insulin that starts at BG of 110.Once diet is advanced I would change this sliding scale to TID with meals and add prandial Novolog insulin as used at home (1 unit for every 6 grams of carbohydrate with meals).     3.  Hypertension. Continue PTA amlodipine and lisinopril     4.  Hyperlipidemia. Continue PTA Lipitor       DVT Prophylaxis: Mechanical.  Code Status: Full Code  Discharge Dispo: Home  Estimated Disch Date / # of Days until Discharge: likely in 2 days        Interval History:      No new problems. Anticipating surgery this afternoon or evening.                   Physical Exam:      Last Vital Signs:  BP (!) 177/68 (BP Location: Right arm)   Pulse 54   Temp 96.2  F (35.7  C) (Oral)   Resp 16   Wt 108.6 kg (239 lb 6.4 oz)   SpO2 100%   BMI 32.92 kg/m      Intake/Output Summary (Last 24 hours) at 8/2/2019 1414  Last data filed at 8/2/2019 0629  Gross per 24 hour   Intake 1550 ml   Output --   Net 1550 ml       GENERAL:  Comfortable. Cooperative.  PSYCH: pleasant, oriented, No acute distress.  EYES: PERRLA, Normal conjunctiva.  HEART:  Regular rate and rhythm. No JVD. Pulses normal. No edema.  LUNGS:  Clear to auscultation, normal Respiratory effort.  ABDOMEN:  Soft, no hepatosplenomegaly, normal bowel sounds.  EXTREMETIES: No clubbing, cyanosis or ischemia  SKIN:  Dry to touch, No rash.           Medications:      All current medications were reviewed.         Data:      All new lab and imaging data was reviewed.   Labs:       Lab Results   Component Value Date     08/02/2019     08/01/2019     07/07/2019    Lab Results   Component Value Date    CHLORIDE 111 08/02/2019    CHLORIDE 109 08/01/2019    CHLORIDE 104 07/07/2019    Lab Results   Component Value Date    BUN 15 08/02/2019    BUN 17 08/01/2019    BUN 20 07/07/2019      Lab Results   Component Value Date    POTASSIUM 4.7 08/02/2019    POTASSIUM 3.9 08/01/2019    POTASSIUM 4.1 07/07/2019    Lab  Results   Component Value Date    CO2 27 08/02/2019    CO2 24 08/01/2019    CO2 27 07/07/2019    Lab Results   Component Value Date    CR 0.90 08/02/2019    CR 0.83 08/01/2019    CR 0.97 07/07/2019        Recent Labs   Lab 08/02/19  0610 08/01/19  2222   WBC 5.0 5.5   HGB 12.0* 11.5*   HCT 39.1* 37.0*   MCV 93 93   * 134*

## 2019-08-02 NOTE — H&P
Hospitalist Admission Note(Formerly [facility])    Name: Crispin Rojas    MRN: [id number]          YOB: 1960    Age: 59 year old  Date of admission: 8/1/2019  Primary care provider: Aden Lopez              Assessment:       Brief summary of admission assessment:Crispin Rojas is a 59 year old  male with a significant past medical history of insulin requiring diabetes, hypertension, CVA, obstructive sleep apnea who was referred as a direct admission by podiatry service after his MRI findings confirmed osteomyelitis of the right fourth toe.  Patient had previous toe amputations on the left and left foot chronic wound which is healed.  He developed right 4th toe wound for which she was seen by podiatry service on Monday when he had MRI study.  Dr. Younger sent him to the hospital as a direct admission for IV antibiotic, surgical intervention tomorrow evening.         Admission diagnoses:      #1.  Right fourth toe osteomyelitis due to diabetes foot infection    #2.  Type 2 diabetes, insulin requiring.  Patient takes 55 units of Tresiba 3 AM every day and uses variable doses of lispro based on what he eats.  He is also on metformin daily.  Blood sugar was low at 27  when he got here .  Patient was asymptomatic and was treated with juice and ate food which improved his blood sugar to 75 later.    #3.  Hypertension on amlodipine and lisinopril    #4.  Hyperlipidemia on Lipitor      Chronic comorbidities include obstructive sleep apnea, obesity, renal stone, GERD, history of CVA, previous toe amputations, liver cirrhosis by imaging, recent admission in early July for nonspecific epigastric abdominal pain which resolved.     Plan/MDM:       > Admission Status: Will admit patient to hospitalist service as inpatient as patient likely need over two mid night stays in the hospital.     >Care plan:    --Admit to medical bed, hypoglycemia protocol, avoid long-acting insulin until he gets  surgery.  Sliding scale insulin every 4 hours to control hyperglycemia.  Hold metformin, initiate antibiotic with IV Zosyn.  Podiatry planning to do surgery tomorrow evening.  I will keep him n.p.o. after 7 AM tomorrow.  --Patient has no evidence of sepsis at this time.  --We will get basic labs including basic metabolic panel, CBC   --    >Supportive care:Pain management: acetominophen, oral narcotics and IV narcotics  Nausea and vomiting control measures    >Diet:Diet advanced    >Activity:Advance activity as tolerated    >Education/Counseling :Discussed treatment plan with the patient    >Consults:Inpatient consult with podiatry    >VTE prophylactic measures:prophylaxis against venous thromboembolism    >Therapies:Wound care      >Additional orders:    --Care plan discussed with the patient/family and agreed to care plan   --Patient will be transferred to care of hospitalist attending for further evaluation and management as appropriate   --Old medical orders reviewed   --imaging result independently reviewed by me     (See orders placed for this visit by me )     - Home medication reviewed and will be continued as appropriate once pharmacy reconciliation is completed         Code Status/Disposition:     >Code Status:Full Code      >Disposition:anticipate discharge to home and Anticipate discharge in 3 days        Disclaimer: This note consists of symbols derived from keyboarding, dictation and/or voice recognition software. As a result, there may be errors in the script that have gone undetected. Please consider this when interpreting information found in this chart.             Chief Complaint:     Referred by podiatry service for diagnosis of osteomyelitis     History is obtained from the patient          History of Present Illness:      This patient is a 59 year old  male with a significant past medical history of complicated diabetes who presents with the following condition requiring a hospital  admission:        Right fourth toe osteomyelitis    Patient is a 59-year-old male with history of type 2 diabetes with complications with multiple amputations in the past presents with diagnosis of osteomyelitis on MRI study which was obtained few days ago.  Patient was seen by Dr. Younger of podiatry service who is planning to do surgery in the inpatient setting.  Patient has been having right foot wound for a long time and has been having some drainage.  His main problem on the left foot which is better at this time.  He denies any fever or chills.  He was seen by podiatry service on Monday and had x-ray as well as MRI scheduled for him.  MRI study showed osteomyelitis and patient was advised to come to the hospital for IV antibiotic and surgical intervention.   Patient denies any chest pain or shortness of breath.  Of note he takes large dose of Tarceva 3 in the morning every day as well as short-acting insulin as needed per sliding and carb counting.  On arrival here in the hospital patient blood sugar with hypoglycemic at 27 but he was asymptomatic.  He was treated orally and his blood sugar improved.  Patient is very knowledgeable about his diabetes control.           Past Medical History:     Past Medical History:   Diagnosis Date     Cerebral infarction (H)      Chronic infection      H/O heartburn      History of heartburn      Hypertension      Numbness and tingling      Obesity      GILA (obstructive sleep apnea) 10/22/2018     Renal stone      Type II or unspecified type diabetes mellitus without mention of complication, not stated as uncontrolled             Past Surgical History:     Past Surgical History:   Procedure Laterality Date     AMPUTATE FOOT Left 8/18/2016    Procedure: AMPUTATE FOOT;  Surgeon: Jack Younger DPM;  Location: [facility] OR     AMPUTATE TOE(S) Right 2/1/2016    Procedure: AMPUTATE TOE(S);  Surgeon: Rachelle Manriquez DPM, Pod;  Location: [facility] OR     ANGIOGRAM       COLONOSCOPY        COMBINED CYSTOSCOPY, RETROGRADES, URETEROSCOPY, INSERT STENT Left 8/21/2016    Procedure: COMBINED CYSTOSCOPY, RETROGRADES, URETEROSCOPY, INSERT STENT;  Surgeon: Artemio Valenzuela MD;  Location: [facility] OR     COMBINED CYSTOSCOPY, RETROGRADES, URETEROSCOPY, LASER HOLMIUM LITHOTRIPSY URETER(S), INSERT STENT Left 10/3/2016    Procedure: COMBINED CYSTOSCOPY, RETROGRADES, URETEROSCOPY, LASER HOLMIUM LITHOTRIPSY URETER(S), INSERT STENT;  Surgeon: Artemio Valenzuela MD;  Location: [facility] OR     EXTRACORPOREAL SHOCK WAVE LITHOTRIPSY (ESWL) Left 9/8/2016    Procedure: EXTRACORPOREAL SHOCK WAVE LITHOTRIPSY (ESWL);  Surgeon: Artemio Valenzuela MD;  Location: [facility] OR     RECESSION GASTROCNEMIUS Right 2/1/2016    Procedure: RECESSION GASTROCNEMIUS;  Surgeon: Rachelle Manriquez, DPM, Pod;  Location: [facility] OR             Social History:     Social History     Tobacco Use     Smoking status: Never Smoker     Smokeless tobacco: Never Used   Substance Use Topics     Alcohol use: Yes     Alcohol/week: 0.0 oz     Comment: rare---red wine 2x per week             Family History:     Family History   Problem Relation Age of Onset     Arthritis Mother         SLE     Connective Tissue Disorder Mother         lupus     Diabetes Mother      Cerebrovascular Disease Mother      Cancer Mother      Diabetes Father      Diabetes Maternal Grandfather      Diabetes Sister           Allergies:     Allergies   Allergen Reactions     Niacin Swelling     SIMCOR caused edema in extremities     Simvastatin Swelling     SIMCOR caused edema in extremities             Medications:        Prior to Admission medications    Medication Sig Last Dose Taking? Auth Provider   amLODIPine (NORVASC) 5 MG tablet Take 1 tablet (5 mg) by mouth daily 8/1/2019 at am Yes Aden Loepz MD   atorvastatin (LIPITOR) 40 MG tablet TAKE 1 TABLET BY MOUTH EVERY DAY 8/1/2019 at am Yes Aden Lopez MD   Cholecalciferol (VITAMIN D3 PO) Take 1,000 Units by mouth  daily  8/1/2019 at am Yes Reported, Patient   CIALIS 5 MG tablet TAKE 1 TABLET BY MOUTH EVERY DAY AS NEEDED  Yes Aden Lopez MD   Co-Enzyme Q10 100 MG CAPS Take 100 mg by mouth daily  Past Week at Unknown time Yes Aden Lopez MD   gabapentin (NEURONTIN) 100 MG capsule Take 300 mg by mouth 3 times daily 8/1/2019 at x1 Yes Unknown, Entered By History   insulin degludec (TRESIBA FLEXTOUCH) 100 UNIT/ML pen Inject 55 Units Subcutaneous daily 8/1/2019 at am Yes Jasmin Arteaga APRN CNP   insulin lispro (HUMALOG PEN) 100 UNIT/ML pen Inject 30 Units Subcutaneous 3 times daily (before meals) Breakfast and Dinner 8/1/2019 at 1330 Yes Jasmin Arteaga APRN CNP   ketoconazole (NIZORAL) 2 % external shampoo Apply topically daily as needed , apply daily and wash off after 5 min  Yes Unknown, Entered By History   lisinopril (PRINIVIL/ZESTRIL) 40 MG tablet Take 1 tablet (40 mg) by mouth daily 8/1/2019 at am Yes Aden Lopez MD   metFORMIN (GLUCOPHAGE-XR) 500 MG 24 hr tablet TAKE 2 TABLETS BY MOUTH TWICE DAILY WITH MEALS 8/1/2019 at x2 Yes Jasmin Arteaga APRN CNP   Multiple Vitamins-Minerals (MULTIVITAMIN PO) Take 1 tablet by mouth daily  8/1/2019 at am Yes Reported, Patient   Omega-3 Fatty Acids (OMEGA-3 FISH OIL PO) Take 1 g by mouth 2 times daily (with meals)  Past Week at Unknown time Yes Reported, Patient   semaglutide (OZEMPIC) 1 MG/DOSE pen Inject 1 mg Subcutaneous every 7 days , on Wednesdays 7/31/2019 at Unknown time Yes Jasmin Arteaga APRN CNP   blood glucose (NO BRAND SPECIFIED) lancets standard Use to test blood sugar 3 times daily or as directed.   Jasmin Arteaga APRN CNP   blood glucose monitoring (NO BRAND SPECIFIED) meter device kit Use to test blood sugar 3 times daily or as directed.   Aden Lopez MD   blood glucose monitoring (NO BRAND SPECIFIED) test strip Use to test blood sugars 3 times daily or as directed   Jasmin Arteaga APRN CNP    Continuous Blood Gluc Sensor (FREESTYLE LUCERO 14 DAY SENSOR) MISC 1 each every 14 days   Jasmin Arteaga APRN CNP   Insulin Pen Needle (PEN NEEDLES) 31G X 5 MM MISC 1 Device 5 times daily , TWICE DAILY WITH LANTUS AND 3 TIMES A DAY PRN WITH NOVOLOG FLEXPEN   Jasmin Arteaga APRN CNP          Review of Systems:     A Comprehensive greater than 10 system review of systems was carried out.  Pertinent positives and negatives are noted above in HPI.  Otherwise negative for contributory information.           Physical Exam:     Vital signs were reviewed    Temp:  [97.1  F (36.2  C)] 97.1  F (36.2  C)  Pulse:  [54-59] 59  Resp:  [16] 16  BP: (148-153)/(65-70) 153/70  SpO2:  [94 %-96 %] 94 %        GEN: awake, alert, cooperative, no apparent distress, oriented x 3    NECK:Supple ,no mass or thyromegaly     HEENT:  Normocephalic/atraumatic, no scleral icterus, no nasal discharge, mouth moist.    CV:  Regular rate and rhythm, no murmur or JVD.  S1 + S2 noted, no S3 or S4.    LUNGS:  Clear to auscultation bilaterally without rales/rhonchi/wheezing/retractions.  Symmetric chest rise on inhalation noted.    ABD:  Active bowel sounds, soft, non-tender/non-distended.  No rebound/guarding/rigidity.    EXT: Right first toe ulcer with no tenderness or significant drainage.  Fifth toe amputation on the left.  LGS: No cervical or axillary lymphadenopathy     SKIN:  Dry to touch, warm ,no exanthems noted in the visualized areas.    Neurologic:Grossly intact,non focal . No acute focal neurologic deficit     Psychaitric exam: Mood and affect normal                    Data:       All laboratory and imaging data in the past 24 hours reviewed     Results for orders placed or performed during the hospital encounter of 08/01/19   Glucose by meter   Result Value Ref Range    Glucose 27 (LL) 70 - 99 mg/dL   Glucose by meter   Result Value Ref Range    Glucose 46 (LL) 70 - 99 mg/dL   Glucose by meter   Result Value Ref Range    Glucose  63 (L) 70 - 99 mg/dL   Glucose by meter   Result Value Ref Range    Glucose 66 (L) 70 - 99 mg/dL   Hemoglobin A1c   Result Value Ref Range    Hemoglobin A1C 6.8 (H) 0 - 5.6 %   Glucose by meter   Result Value Ref Range    Glucose 64 (L) 70 - 99 mg/dL   Glucose by meter   Result Value Ref Range    Glucose 55 (L) 70 - 99 mg/dL   Glucose by meter   Result Value Ref Range    Glucose 75 70 - 99 mg/dL            Recent Results (from the past 48 hour(s))   MR Foot Right w/o & w Contrast    Narrative    MR RIGHT FOOT WITHOUT AND WITH CONTRAST  7/31/2019 2:03 PM    HISTORY: Prior great toe amputation for osteomyelitis. Now with  nonhealing ulcer dorsal fourth toe probing to bone. Diabetic patient.    TECHNIQUE: Multisequence multiplanar MRI without and with IV contrast.  10 mL Gadavist given intravenously.    COMPARISON: 1/25/2016.    FINDINGS: Interval amputation of the great toe at the level of the  distal aspect of the proximal phalanx. No acute-appearing abnormality  in the region of previous surgery.    There is a shallow ulcer at the dorsal aspect of the fourth toe  measuring up to 1.5 cm anterior posterior dimension and probably less  than 1 cm mediolateral dimension. No evidence for abscess in this  region or elsewhere. There is abnormal increased T2 signal and marked  contrast enhancement of the bone marrow in the middle and distal  phalanx of the fourth toe, new since the prior exam. Although there is  no conspicuous cortical destruction, the bone marrow edema and  contrast enhancement are significant and very suspicious for  osteomyelitis. The proximal phalanx is not involved. No joint space  effusions. Soft tissue swelling about the fourth toe.    The previously seen increased T2 signal at the base of the proximal  phalanx of the fifth toe has completely resolved. This may have been a  fracture which has healed. No other significant changes since the  prior exam.      Impression    IMPRESSION:  1. Shallow ulcer  dorsal fourth toe with underlying prominent bone  marrow edema and contrast-enhancement in the middle and distal phalanx  suspicious for osteomyelitis.  2. No evidence for soft tissue abscess.  3. Interval amputation of the distal great toe.  4. Interval resolution of previously seen abnormal signal at the base  of the proximal phalanx of the fifth toe. This may have been a  fracture which has healed.    ANA LARKIN MD        All imaging studies reviewed by me.

## 2019-08-02 NOTE — ANESTHESIA POSTPROCEDURE EVALUATION
Patient: Crispin Rojas    Procedure(s):  RIGHT 4th partial Toe Amputation    Diagnosis:Unknown  Diagnosis Additional Information: No value filed.    Anesthesia Type:  MAC    Note:  Anesthesia Post Evaluation    Patient location during evaluation: PACU  Patient participation: Able to fully participate in evaluation  Level of consciousness: awake and alert  Pain management: adequate  Airway patency: patent  Cardiovascular status: acceptable  Respiratory status: acceptable  Hydration status: acceptable  PONV: none     Anesthetic complications: None          Last vitals:  Vitals:    08/02/19 0159 08/02/19 0756 08/02/19 1610   BP: 138/68 (!) 177/68 (!) 156/79   Pulse: 50 54    Resp: 16 16 16   Temp: 96.2  F (35.7  C) 96.2  F (35.7  C) 97  F (36.1  C)   SpO2: 97% 100% 96%         Electronically Signed By: Jack Reinoso MD  August 2, 2019  5:56 PM

## 2019-08-02 NOTE — PLAN OF CARE
A&Ox4. Monitoring BG Q4H. VSS. LS CTA all fields. BS active x4. Pt removed dressing to 4th digit, reports that it's been MUNDO at night at home. Small scab to R 4th toe. L 2nd toe dislocated. CMS intact. cristian mod CHO diet well. Will be NPO at 0700. Up independently. Denies pain. voiding in good amts. Surgery today at 1700, will continue to monitor.

## 2019-08-02 NOTE — BRIEF OP NOTE
[facility]    Brief Operative Note    Pre-operative diagnosis: R 4th toe osteomyelitis  Post-operative diagnosis Same  Procedure: Procedure(s):  RIGHT 4th partial Toe Amputation  Surgeon: Surgeon(s) and Role:     * Jack Younger DPM - Primary  Anesthesia: General   Estimated blood loss: Less than 10 ml  Drains: None  Specimens:   ID Type Source Tests Collected by Time Destination   1 : Right fourth toe bone Bone Toe ANAEROBIC BACTERIAL CULTURE, BONE CULTURE AEROBIC BACTERIAL Jack Younger DPM 8/2/2019  5:14 PM    A : Right fourth partial toe - evaluate for osteomyelitis Tissue Toe SURGICAL PATHOLOGY EXAM Jack Younger DPM 8/2/2019  5:16 PM      Findings:   None.  Complications: None.  Implants:  * No implants in log *

## 2019-08-02 NOTE — ANESTHESIA CARE TRANSFER NOTE
Patient: Crispin Rojas    Procedure(s):  RIGHT 4th partial Toe Amputation    Diagnosis: Unknown  Diagnosis Additional Information: No value filed.    Anesthesia Type:   MAC     Note:  Airway :Room Air  Patient transferred to:Phase II  Handoff Report: Identifed the Patient, Identified the Reponsible Provider, Reviewed the pertinent medical history, Discussed the surgical course, Reviewed Intra-OP anesthesia mangement and issues during anesthesia, Set expectations for post-procedure period and Allowed opportunity for questions and acknowledgement of understanding      Vitals: (Last set prior to Anesthesia Care Transfer)    CRNA VITALS  8/2/2019 1710 - 8/2/2019 1746      8/2/2019             Pulse:  55    SpO2:  98 %                Electronically Signed By: SARAH Alvarez CRNA  August 2, 2019  5:46 PM

## 2019-08-02 NOTE — ANESTHESIA PREPROCEDURE EVALUATION
Anesthesia Pre-Procedure Evaluation    Patient: Crispin Rojas   MRN: [id number] : 1960          Preoperative Diagnosis: Unknown    Procedure(s):  RIGHT 4th Toe Amputation    Past Medical History:   Diagnosis Date     Cerebral infarction (H)      Chronic infection      H/O heartburn      History of heartburn      Hypertension      Numbness and tingling      Obesity      GILA (obstructive sleep apnea) 10/22/2018     Renal stone      Type II or unspecified type diabetes mellitus without mention of complication, not stated as uncontrolled      Past Surgical History:   Procedure Laterality Date     AMPUTATE FOOT Left 2016    Procedure: AMPUTATE FOOT;  Surgeon: Jack Younger DPM;  Location: [facility] OR     AMPUTATE TOE(S) Right 2016    Procedure: AMPUTATE TOE(S);  Surgeon: Rachelle Manriquez DPM, Pod;  Location: [facility] OR     ANGIOGRAM       COLONOSCOPY       COMBINED CYSTOSCOPY, RETROGRADES, URETEROSCOPY, INSERT STENT Left 2016    Procedure: COMBINED CYSTOSCOPY, RETROGRADES, URETEROSCOPY, INSERT STENT;  Surgeon: Artemio Valenzuela MD;  Location: [facility] OR     COMBINED CYSTOSCOPY, RETROGRADES, URETEROSCOPY, LASER HOLMIUM LITHOTRIPSY URETER(S), INSERT STENT Left 10/3/2016    Procedure: COMBINED CYSTOSCOPY, RETROGRADES, URETEROSCOPY, LASER HOLMIUM LITHOTRIPSY URETER(S), INSERT STENT;  Surgeon: Artemio Valenzuela MD;  Location: [facility] OR     EXTRACORPOREAL SHOCK WAVE LITHOTRIPSY (ESWL) Left 2016    Procedure: EXTRACORPOREAL SHOCK WAVE LITHOTRIPSY (ESWL);  Surgeon: Artemio Valenzuela MD;  Location:  OR     RECESSION GASTROCNEMIUS Right 2016    Procedure: RECESSION GASTROCNEMIUS;  Surgeon: Rachelle Manriquez DPM, Pod;  Location: [facility] OR     Anesthesia Evaluation     . Pt has had prior anesthetic. Type: General    No history of anesthetic complications          ROS/MED HX    ENT/Pulmonary:     (+)sleep apnea, uses CPAP , . .    Neurologic:     (+)CVA     Cardiovascular:     (+) hypertension----. :  ". . . :. .       METS/Exercise Tolerance:     Hematologic:  - neg hematologic  ROS       Musculoskeletal:  - neg musculoskeletal ROS       GI/Hepatic:  - neg GI/hepatic ROS       Renal/Genitourinary:     (+) chronic renal disease, type: CRI,       Endo:     (+) type II DM Obesity, .      Psychiatric:  - neg psychiatric ROS       Infectious Disease:  - neg infectious disease ROS       Malignancy:         Other:    - neg other ROS                      Physical Exam  Normal systems: cardiovascular and pulmonary    Airway   Mallampati: II  TM distance: >3 FB  Neck ROM: full    Dental   (+) missing    Cardiovascular       Pulmonary             Lab Results   Component Value Date    WBC 5.0 08/02/2019    HGB 12.0 (L) 08/02/2019    HCT 39.1 (L) 08/02/2019     (L) 08/02/2019    CRP 36.1 (H) 06/26/2019    SED 50 (H) 06/26/2019     08/02/2019    POTASSIUM 4.7 08/02/2019    CHLORIDE 111 (H) 08/02/2019    CO2 27 08/02/2019    BUN 15 08/02/2019    CR 0.90 08/02/2019    GLC 86 08/02/2019    NARA 8.8 08/02/2019    MAG 2.1 08/02/2019    ALBUMIN 3.9 07/07/2019    PROTTOTAL 8.7 07/07/2019    ALT 50 07/07/2019    AST 43 07/07/2019    ALKPHOS 57 07/07/2019    BILITOTAL 0.7 07/07/2019    LIPASE 220 07/07/2019    INR 1.04 08/19/2013    TSH 1.78 12/27/2017       Preop Vitals  BP Readings from Last 3 Encounters:   08/02/19 (!) 177/68   07/29/19 (!) 146/62   07/19/19 138/62    Pulse Readings from Last 3 Encounters:   08/02/19 54   07/19/19 68   07/10/19 54      Resp Readings from Last 3 Encounters:   08/02/19 16   07/10/19 14   07/10/19 12    SpO2 Readings from Last 3 Encounters:   08/02/19 100%   07/10/19 96%   07/07/19 94%      Temp Readings from Last 1 Encounters:   08/02/19 96.2  F (35.7  C) (Oral)    Ht Readings from Last 1 Encounters:   07/29/19 1.816 m (5' 11.5\")      Wt Readings from Last 1 Encounters:   08/02/19 108.6 kg (239 lb 6.4 oz)    Estimated body mass index is 32.92 kg/m  as calculated from the following:    " "Height as of 7/29/19: 1.816 m (5' 11.5\").    Weight as of this encounter: 108.6 kg (239 lb 6.4 oz).       Anesthesia Plan      History & Physical Review  History and physical reviewed and following examination; no interval change.    ASA Status:  3 .    NPO Status:  > 8 hours    Plan for MAC with Intravenous induction. Maintenance will be TIVA.  Reason for MAC:  Deep or markedly invasive procedure (G8)  PONV prophylaxis:  Ondansetron (or other 5HT-3) and Dexamethasone or Solumedrol       Postoperative Care  Postoperative pain management:  IV analgesics, Oral pain medications and Multi-modal analgesia.      Consents  Anesthetic plan, risks, benefits and alternatives discussed with:  Patient..                 Jack Reinoso MD                    .  "

## 2019-08-02 NOTE — TELEPHONE ENCOUNTER
Pt was admitted to the hospitalist, who placed orders.  I set up the pt for surgery today after I spoke with him yesterday.

## 2019-08-02 NOTE — PLAN OF CARE
A/O.  Very pleasant.  Up ad tasia in room. Denies pain. Reports has some numbness at baseline to bilat feet. Checking blood sugars q4h now that pt is NPO.  OR tonight at 1700 for toe amp. Report called to pre-op earlier this afternoon. IV fluids started. Voiding. Will continue to monitor.

## 2019-08-02 NOTE — PHARMACY-ADMISSION MEDICATION HISTORY
Admission medication history interview status for this patient is complete. See [facility] admission navigator for allergy information, prior to admission medications and immunization status.     Medication history interview source(s):Patient  Clarification of Diabetes regimen  Pt takes tresiba 55 units subcutaneous qam at 0300 daily based on work schedule  and Humalog 1unit/6g carbohydrates with meals  Pt mentioned that his BG goal at home is 70-90    For patients on insulin therapy: Y (Y/N)  Lantus/levemir/NPH/Mix 70/30 dose:  Y  (Y/N) (see Med list for doses)   Sliding scale Novolog Y  If Yes, do you have a baseline novolog pre-meal dose:  units with meals  Patients eat three meals a day:   N  How many episodes of hypoglycemia do you have per week:  a few- but has hypoglycemia unawareness  How many missed doses do you have per week: __0____  How many times do you check your blood glucose per day: ___Has CGM____  Do you have a Continuous glucose monitor (CGM)   Y (remind pt that not approved for hospital use)   Any Barriers to therapy - Be specific :  cost of medications, comfortable with giving injections (if applicable), comfortable and confident with current diabetes regimen: Y/N       Prior to Admission medications    Medication Sig Last Dose Taking? Auth Provider   amLODIPine (NORVASC) 5 MG tablet Take 1 tablet (5 mg) by mouth daily 8/1/2019 at am Yes Aden Lopez MD   atorvastatin (LIPITOR) 40 MG tablet TAKE 1 TABLET BY MOUTH EVERY DAY 8/1/2019 at am Yes Aden Lopez MD   Cholecalciferol (VITAMIN D3 PO) Take 1,000 Units by mouth daily  8/1/2019 at am Yes Reported, Patient   CIALIS 5 MG tablet TAKE 1 TABLET BY MOUTH EVERY DAY AS NEEDED  Yes Aden Lopez MD   Co-Enzyme Q10 100 MG CAPS Take 100 mg by mouth daily  Past Week at Unknown time Yes Aden Lopez MD   gabapentin (NEURONTIN) 100 MG capsule Take 300 mg by mouth 3 times daily 8/1/2019 at x1 Yes Unknown, Entered By  History   insulin degludec (TRESIBA FLEXTOUCH) 100 UNIT/ML pen Inject 55 Units Subcutaneous daily  Patient taking differently: Inject 55 Units Subcutaneous daily Pt takes at 0300 every day due to his work schedule 8/1/2019 at am Yes Jasmin Arteaga APRN CNP   insulin lispro (HUMALOG KWIKPEN) 100 UNIT/ML pen Inject Subcutaneous 3 times daily (before meals) humalog 1unit per 6 g carbohydrates with meals 8/1/2019 at Unknown time Yes Unknown, Entered By History   ketoconazole (NIZORAL) 2 % external shampoo Apply topically daily as needed , apply daily and wash off after 5 min  Yes Unknown, Entered By History   lisinopril (PRINIVIL/ZESTRIL) 40 MG tablet Take 1 tablet (40 mg) by mouth daily 8/1/2019 at am Yes Aden Lopez MD   metFORMIN (GLUCOPHAGE-XR) 500 MG 24 hr tablet TAKE 2 TABLETS BY MOUTH TWICE DAILY WITH MEALS 8/1/2019 at x2 Yes Jasmin Arteaga APRN CNP   Multiple Vitamins-Minerals (MULTIVITAMIN PO) Take 1 tablet by mouth daily  8/1/2019 at am Yes Reported, Patient   Omega-3 Fatty Acids (OMEGA-3 FISH OIL PO) Take 1 g by mouth 2 times daily (with meals)  Past Week at Unknown time Yes Reported, Patient   semaglutide (OZEMPIC) 1 MG/DOSE pen Inject 1 mg Subcutaneous every 7 days , on Wednesdays 7/31/2019 at Unknown time Yes Jasmin Arteaga APRN CNP   blood glucose (NO BRAND SPECIFIED) lancets standard Use to test blood sugar 3 times daily or as directed.   Jasmin Arteaga APRN CNP   blood glucose monitoring (NO BRAND SPECIFIED) meter device kit Use to test blood sugar 3 times daily or as directed.   Aden Lopez MD   blood glucose monitoring (NO BRAND SPECIFIED) test strip Use to test blood sugars 3 times daily or as directed   Jasmin Arteaga APRN CNP   Continuous Blood Gluc Sensor (FREESTYLE LUCERO 14 DAY SENSOR) MISC 1 each every 14 days   Jasmin Arteaga APRN CNP   Insulin Pen Needle (PEN NEEDLES) 31G X 5 MM MISC 1 Device 5 times daily , TWICE DAILY WITH LANTUS AND 3  TIMES A DAY PRN WITH NOVOLOG FLEXJasmin Ruelas APRN CNP

## 2019-08-03 VITALS
TEMPERATURE: 97.7 F | RESPIRATION RATE: 16 BRPM | DIASTOLIC BLOOD PRESSURE: 69 MMHG | BODY MASS INDEX: 34.42 KG/M2 | HEART RATE: 46 BPM | SYSTOLIC BLOOD PRESSURE: 144 MMHG | WEIGHT: 250.3 LBS | OXYGEN SATURATION: 94 %

## 2019-08-03 LAB
ANION GAP SERPL CALCULATED.3IONS-SCNC: 4 MMOL/L (ref 3–14)
BUN SERPL-MCNC: 14 MG/DL (ref 7–30)
CALCIUM SERPL-MCNC: 8 MG/DL (ref 8.5–10.1)
CHLORIDE SERPL-SCNC: 104 MMOL/L (ref 94–109)
CO2 SERPL-SCNC: 30 MMOL/L (ref 20–32)
CREAT SERPL-MCNC: 0.53 MG/DL (ref 0.66–1.25)
GFR SERPL CREATININE-BSD FRML MDRD: >90 ML/MIN/{1.73_M2}
GLUCOSE BLDC GLUCOMTR-MCNC: 112 MG/DL (ref 70–99)
GLUCOSE BLDC GLUCOMTR-MCNC: 135 MG/DL (ref 70–99)
GLUCOSE BLDC GLUCOMTR-MCNC: 79 MG/DL (ref 70–99)
GLUCOSE SERPL-MCNC: 86 MG/DL (ref 70–99)
POTASSIUM SERPL-SCNC: 3.7 MMOL/L (ref 3.4–5.3)
SODIUM SERPL-SCNC: 138 MMOL/L (ref 133–144)

## 2019-08-03 PROCEDURE — 99238 HOSP IP/OBS DSCHRG MGMT 30/<: CPT | Performed by: INTERNAL MEDICINE

## 2019-08-03 PROCEDURE — 36415 COLL VENOUS BLD VENIPUNCTURE: CPT | Performed by: PODIATRIST

## 2019-08-03 PROCEDURE — 80048 BASIC METABOLIC PNL TOTAL CA: CPT | Performed by: PODIATRIST

## 2019-08-03 PROCEDURE — 25000132 ZZH RX MED GY IP 250 OP 250 PS 637: Performed by: INTERNAL MEDICINE

## 2019-08-03 PROCEDURE — 25000128 H RX IP 250 OP 636: Performed by: PODIATRIST

## 2019-08-03 PROCEDURE — 00000146 ZZHCL STATISTIC GLUCOSE BY METER IP

## 2019-08-03 PROCEDURE — L3260 AMBULATORY SURGICAL BOOT EAC: HCPCS

## 2019-08-03 PROCEDURE — 25000131 ZZH RX MED GY IP 250 OP 636 PS 637: Performed by: INTERNAL MEDICINE

## 2019-08-03 RX ADMIN — GABAPENTIN 300 MG: 300 CAPSULE ORAL at 08:15

## 2019-08-03 RX ADMIN — INSULIN ASPART 15 UNITS: 100 INJECTION, SOLUTION INTRAVENOUS; SUBCUTANEOUS at 08:12

## 2019-08-03 RX ADMIN — LISINOPRIL 40 MG: 40 TABLET ORAL at 08:15

## 2019-08-03 RX ADMIN — TAZOBACTAM SODIUM AND PIPERACILLIN SODIUM 3.38 G: 375; 3 INJECTION, SOLUTION INTRAVENOUS at 02:13

## 2019-08-03 RX ADMIN — TAZOBACTAM SODIUM AND PIPERACILLIN SODIUM 3.38 G: 375; 3 INJECTION, SOLUTION INTRAVENOUS at 08:12

## 2019-08-03 RX ADMIN — INSULIN GLARGINE 45 UNITS: 100 INJECTION, SOLUTION SUBCUTANEOUS at 08:15

## 2019-08-03 RX ADMIN — AMLODIPINE BESYLATE 5 MG: 5 TABLET ORAL at 08:15

## 2019-08-03 RX ADMIN — ATORVASTATIN CALCIUM 40 MG: 40 TABLET, FILM COATED ORAL at 08:15

## 2019-08-03 ASSESSMENT — ACTIVITIES OF DAILY LIVING (ADL)
ADLS_ACUITY_SCORE: 11

## 2019-08-03 NOTE — PLAN OF CARE
A&O. Elevated Bps. Denied much pain. Dressing intact. Surgical foot elevated. On IV Zosyn. Blood glucose monitored. Voiding.

## 2019-08-03 NOTE — PROVIDER NOTIFICATION
"Notified provider following message     \"Pt back from surgery and had dinner. He is a quite picky with his blood glucose and his orders reflect being NPO for surgery. His BG is now 216 and he would like 15 units of novolog vs the 4 that are ordered. Or back to his home routine? Can we have this adjusted please? Thanks.\"  "

## 2019-08-03 NOTE — PROGRESS NOTES
Discharge instructions given to pt.  Verbalizes understanding.  Reinforced no working until after follow up appointment.  Pt to keep dressing clean and dry until appointment.  Prescription for Augmentin given to pt. Personal belongings gathered including post-op shoe.  Wife expected to  pt around 1330

## 2019-08-03 NOTE — PROGRESS NOTES
[facility]  August 3, 2019    A/P:  S/p R 4th toe partial amputation for osteomyelitis POD#1.    -Discussed condition and treatment options including pros and cons.  -Dressing changed.  Keep c/d/i until clinic follow-up.  -Minimal weight bearing in post op shoe, wt bear more on heel.  -Elevate surgical foot.  -Will start Augmentin for 10 days post op upon discharge.  -F/u with me within 1-2 weeks of discharge.  -Will sign off.      Jack Younger DPM, FACFAS  Pager: [phone]    S:  Pt seen at bedside.  No acute concerns.    O:  BP (!) 144/69 (BP Location: Right arm)   Pulse (!) 46   Temp 97.7  F (36.5  C) (Oral)   Resp 16   Wt 113.5 kg (250 lb 4.8 oz)   SpO2 94%   BMI 34.42 kg/m    NAD.  R foot dressing c/d/i.  Changed.  4th toe amputation site coapted without signs of infection.     All cultures:  Recent Labs   Lab 08/02/19  1714   CULT PENDING  PENDING

## 2019-08-03 NOTE — OP NOTE
Procedure Date: 08/02/2019      SURGEON:  Jack Younger DPM      PREOPERATIVE DIAGNOSIS:  Right fourth toe osteomyelitis.      POSTOPERATIVE DIAGNOSIS:  Right fourth toe osteomyelitis.      PROCEDURE PERFORMED:  Right fourth toe partial amputation for treatment of osteomyelitis.      ANESTHESIA:  MAC with local block.      HEMOSTASIS:  Electrocautery.      ESTIMATED BLOOD LOSS:  Less than 10 mL.      SPECIMENS:     1.  Right fourth toe bone for culture, aerobic and anaerobic.   2.  Right fourth toe partial toe amputation for pathology to evaluate for osteomyelitis.      INDICATIONS:  This is a 59-year-old diabetic male with a history of chronic right fourth toe wound.  MRI examination revealed evidence of osteomyelitis of the distal and middle phalanges.  The patient has a history of diabetes with neuropathy.  Surgical amputation was recommended for treatment of osteomyelitis.  The patient agreed.  Risks versus benefits were discussed at length prior.  Risks include but are not limited to infection, delayed wound healing, open wound, need for additional surgery, further amputation, blood clot, loss of function, new ulceration.      DESCRIPTION OF PROCEDURE:  All questions were answered to the patient's satisfaction.  Consent form was signed and placed in chart.  The patient was brought into the operating room by the anesthesia team and placed supine on the table.  After IV sedation was given, a local block was obtained around the right fourth toe.  Foot was prepped and draped in the usual sterile fashion.      Attention was directed to the right fourth toe where an incision was made full thickness to bone circumferentially around the toe.  This was with a 15 blade.  Dorsal and plantar soft tissue flaps were developed full thickness and handled minimally.  Toe was disarticulated at the proximal interphalangeal joint.  This effectively removed any infected tissue.  Toe was placed on the back table and specimens  were retrieved.  Gloves and instruments were changed at this time.  Surgical site was copiously lavaged with sterile saline.  Flaps were reapproximated under minimal tension with nonabsorbable suture.  Sterile dressing was applied.      The patient was awakened from anesthesia and returned to the PACU unit with vital signs stable and vascular status intact.  He tolerated the procedure and anesthesia well.      PLAN:  The patient will be returned to the inpatient floor.  Our service will continue to follow.         PATSY HOOKER DPM             D: 2019   T: 2019   MT: ONESIMO      Name:     CEDRIC LAW   MRN:      [id number]        Account:        [id number]   :      1960           Procedure Date: 2019      Document: [id number]

## 2019-08-03 NOTE — PLAN OF CARE
RN - Hypertensive and bradycardic - otherwise vitally stable. Minimal c/o pain. Arriving from PACU at 1830. R foot ace wrapped. Pt hesitant to be OOB. Awaiting orthotics consult tomorrow for shoe fitting. Tolerating PO intake post procedure. IVF continue to run. BG orders adjusted. Remains on IV zosyn. Plan for orthotics evaluation tomorrow. Possible discharge.

## 2019-08-03 NOTE — DISCHARGE SUMMARY
Discharge Summary    Crispin Rojas MRN# [id number]   YOB: 1960 Age: 59 year old     Date of Admission:  8/1/2019  Date of Discharge:  8/3/2019  Admitting Physician:  Jose De Jesus Molina MD  Discharge Physician:  Tino Castellon MD  Discharging Service:  Hospitalist     Home clinic: [facility]  Primary Provider: Aden Lopez          Discharge Diagnosis:   1. S/p amputation of right fourth toe for osteomyelitis  2. Insulin dependent type 2 diabetes  3. Episode of hypoglycemia on presentation  4. Hypertension  5. Dyslipidemia             Discharge Disposition:   Discharged to home           Allergies:   Allergies   Allergen Reactions     Niacin Swelling     SIMCOR caused edema in extremities     Simvastatin Swelling     SIMCOR caused edema in extremities                Condition on Discharge:   Discharge condition: Stable   Discharge vitals: Blood pressure (!) 144/69, pulse (!) 46, temperature 97.7  F (36.5  C), temperature source Oral, resp. rate 16, weight 113.5 kg (250 lb 4.8 oz), SpO2 94 %.   Code status on discharge: Full Code   Physical exam on day of discharge:   GENERAL:  Comfortable. Cooperative.  PSYCH: pleasant, oriented, No acute distress.  EYES: PERRLA, Normal conjunctiva.  HEART:  Regular rate and rhythm. No JVD. Pulses normal. No edema.  LUNGS:  Clear to auscultation, normal Respiratory effort.  ABDOMEN:  Soft, no hepatosplenomegaly, normal bowel sounds.  EXTREMETIES: No clubbing, cyanosis or ischemia. Right foot in post op dressing  SKIN:  Dry to touch, No rash.         History of Present Illness and Hospital Course:     See detailed admission note for full details.  Crispin Rojas is a 59 year old male with history of insulin requiring type 2 diabetes, hypertension, previous CVA, and obstructive sleep apnea. He was sent for direct admission from the [facility] with osteomyelitis of his right fourth toe. He has had previous toe amputations on his  left foot. He had a chronic left foot wound which healed.  He developed a wound on his right 4th toe for which he was seen by podiatry on Monday 7/29. MRI was obtained and showed osteomyelitis of the right fourth toe. Direct admission was arranged on 8/1/19 in anticipation of amputation on 8/2/19. He was admitted as an inpatient and treated with Zosyn. Surgery was performed as planned on 8/2/19. There were no surgical complications. Crispin is doing well after surgery and will discharge home with Augmentin and outpatient Podiatry follow up.            Procedures / Imaging:   Amputation of right fourth toe           Consultations:   Consultation during this admission received from Podiatry             Pending Results:   Final pathology from amputation           Discharge Instructions and Follow-Up:   Discharge diet: Diabetic (1800 ADA)   Discharge activity: Activity as tolerated heel weight bearing right foot   Discharge follow-up: Follow up with Podiatry in 1-2 weeks   Outpatient therapy: None    Other instructions: See wound care orders from Podiatry             Discharge Medications:   Current Discharge Medication List      START taking these medications    Details   amoxicillin-clavulanate (AUGMENTIN) 875-125 MG tablet Take 1 tablet by mouth 2 times daily for 10 days  Qty: 20 tablet, Refills: 0    Associated Diagnoses: Other acute osteomyelitis of right foot (H)         CONTINUE these medications which have NOT CHANGED    Details   amLODIPine (NORVASC) 5 MG tablet Take 1 tablet (5 mg) by mouth daily  Qty: 90 tablet, Refills: 0    Associated Diagnoses: Other specified transient cerebral ischemias      atorvastatin (LIPITOR) 40 MG tablet TAKE 1 TABLET BY MOUTH EVERY DAY  Qty: 90 tablet, Refills: 2    Comments: Duplicate, see 3/6/19 rx sent, PLEASE UPDATE  Associated Diagnoses: 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); Hyperlipidemia LDL goal <100      Cholecalciferol (VITAMIN D3 PO) Take 1,000  Units by mouth daily       CIALIS 5 MG tablet TAKE 1 TABLET BY MOUTH EVERY DAY AS NEEDED  Qty: 30 tablet, Refills: 0    Associated Diagnoses: Erectile dysfunction due to diseases classified elsewhere      Co-Enzyme Q10 100 MG CAPS Take 100 mg by mouth daily       gabapentin (NEURONTIN) 100 MG capsule Take 300 mg by mouth 3 times daily      insulin degludec (TRESIBA FLEXTOUCH) 100 UNIT/ML pen Inject 55 Units Subcutaneous daily  Qty: 15 mL, Refills: 11    Associated Diagnoses: Type 2 diabetes mellitus with diabetic peripheral angiopathy and gangrene, with long-term current use of insulin (H)      insulin lispro (HUMALOG KWIKPEN) 100 UNIT/ML pen Inject Subcutaneous 3 times daily (before meals) humalog 1unit per 6 g carbohydrates with meals      ketoconazole (NIZORAL) 2 % external shampoo Apply topically daily as needed , apply daily and wash off after 5 min      lisinopril (PRINIVIL/ZESTRIL) 40 MG tablet Take 1 tablet (40 mg) by mouth daily  Qty: 90 tablet, Refills: 1    Comments: Replacing the 20 mg  Associated Diagnoses: Benign essential hypertension      metFORMIN (GLUCOPHAGE-XR) 500 MG 24 hr tablet TAKE 2 TABLETS BY MOUTH TWICE DAILY WITH MEALS  Qty: 360 tablet, Refills: 3    Associated Diagnoses: 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)      Multiple Vitamins-Minerals (MULTIVITAMIN PO) Take 1 tablet by mouth daily       Omega-3 Fatty Acids (OMEGA-3 FISH OIL PO) Take 1 g by mouth 2 times daily (with meals)       semaglutide (OZEMPIC) 1 MG/DOSE pen Inject 1 mg Subcutaneous every 7 days , on Wednesdays  Qty: 2 pen, Refills: 11    Associated Diagnoses: Type 2 diabetes mellitus with diabetic peripheral angiopathy and gangrene, with long-term current use of insulin (H)      blood glucose (NO BRAND SPECIFIED) lancets standard Use to test blood sugar 3 times daily or as directed.  Qty: 300 each, Refills: 3    Associated Diagnoses: Type 2 diabetes mellitus with diabetic neuropathy, with long-term  current use of insulin (H)      blood glucose monitoring (NO BRAND SPECIFIED) meter device kit Use to test blood sugar 3 times daily or as directed.  Qty: 1 kit, Refills: 0    Associated Diagnoses: 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)      blood glucose monitoring (NO BRAND SPECIFIED) test strip Use to test blood sugars 3 times daily or as directed  Qty: 300 strip, Refills: 3    Associated Diagnoses: 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)      Continuous Blood Gluc Sensor (FREESTYLE LUCERO 14 DAY SENSOR) MISC 1 each every 14 days  Qty: 2 each, Refills: 11    Associated Diagnoses: Type 2 diabetes mellitus with peripheral neuropathy (H); Type 2 diabetes mellitus with diabetic peripheral angiopathy and gangrene, with long-term current use of insulin (H)      Insulin Pen Needle (PEN NEEDLES) 31G X 5 MM MISC 1 Device 5 times daily , TWICE DAILY WITH LANTUS AND 3 TIMES A DAY PRN WITH NOVOLOG FLEXPEN  Qty: 200 each, Refills: 11    Associated Diagnoses: Type 2 diabetes mellitus with peripheral neuropathy (H)                Total time spent in face to face contact with the patient and coordinating discharge was:  25 Minutes

## 2019-08-03 NOTE — PROGRESS NOTES
S:  Crispin was seen in the [facility] room 608 for a post-op healing shoe for his right foot through the on-call practitioner.  Shoe was ordered by DR Aren BASS after a right 4th toe amputation.    O:  Crispin was awake and in good spirits.  He is looking forward to getting out of the hospital.  His right foot and ankle is still under bandages and gauze.    A:  He was fit with a XL post-op healing shoe.  He has worn one of these before so he was aware of the donning, doffing and care of the shoe.   P:  He understands his DPM will determine how long he will have to wear the shoe.   Any issues with the brace should be referred to the [facility] department.      Aden GAONA, CRISTINA, CPed, ATC

## 2019-08-05 ENCOUNTER — TELEPHONE (OUTPATIENT)
Dept: FAMILY MEDICINE | Facility: CLINIC | Age: 59
End: 2019-08-05

## 2019-08-05 LAB
BACTERIA SPEC CULT: ABNORMAL
BACTERIA SPEC CULT: ABNORMAL
SPECIMEN SOURCE: ABNORMAL

## 2019-08-05 NOTE — TELEPHONE ENCOUNTER
Please call patient for Inpatient Discharge f/u 08/03/19. Acute Osteomyelitis Of Right Foot. Ruth Behrens.

## 2019-08-06 NOTE — TELEPHONE ENCOUNTER
"  ED for acute condition Discharge Protocol -     Admitted from 8/1 - 8/3 with right 4th toe amputation - osteomyelitis     \"Hi, my name is Jaymie Patel, a registered nurse, and I am calling from [facility].  I am calling to follow up and see how things are going for you after your recent emergency visit.\"    Tell me how you are doing now that you are home?\"  Patient states he is feeling well. He is NOT having much pain at all. Blood sugars are much better managed with him controlling his own medications.   Patient is trying to abide by resting/elevating instructions however he is having a hard time with this         Discharge Instructions    \"Let's review your discharge instructions.  What is/are the follow-up recommendations?  Discussed below with patient - from AVS - he is having a hard time with resting as much as expected     Wound care and dressings  -keep foot dressing clean, dry and intact until clinic follow up  -heel weightbearing as tolerated in post op shoe with  minimal ambulation  -elevate foot 23/24 hours per day until incision is healed  -perform leg raises, knee bends 4-5 times/day  -seek immediate care for worsening redness, drainage,  swelling, pain, fever, chills, nausea, vomiting, calf pain,  shortness of breath  -no working until clinic follow up    No questions at this time     \"Has an appointment with your primary care provider been scheduled?\"  Patient has an appt scheduled on 9/3/19 with pcp - he does not wish to be seen sooner than this    Podiatry f/u is scheduled for 8/13/19     Medications    \"Tell me what changed about your medicines when you discharged?\"    Patient is taking augmentin - he is tolerating well - no signs of diarrhea - discussed eating yogurt and he does this daily     \"What questions do you have about your medications?\"   None        Call Summary    \"What questions or concerns do you have about your recent visit and your follow-up care?\"     none    \"If you " "have questions or things don't continue to improve, we encourage you contact us through the main clinic number (give number).  Even if the clinic is not open, triage nurses are available 24/7 to help you.     We would like you to know that our clinic has extended hours (provide information).  We also have urgent care (provide details on closest location and hours/contact info)\"    \"Thank you for your time and take care!\"    Antwan Farrar Nurse   [facility]                   "

## 2019-08-07 LAB — COPATH REPORT: NORMAL

## 2019-08-09 LAB
BACTERIA SPEC CULT: NORMAL
Lab: NORMAL
SPECIMEN SOURCE: NORMAL

## 2019-08-12 DIAGNOSIS — L21.9 SEBORRHEIC DERMATITIS: ICD-10-CM

## 2019-08-13 ENCOUNTER — ALLIED HEALTH/NURSE VISIT (OUTPATIENT)
Dept: NURSING | Facility: CLINIC | Age: 59
End: 2019-08-13
Payer: COMMERCIAL

## 2019-08-13 ENCOUNTER — OFFICE VISIT (OUTPATIENT)
Dept: PODIATRY | Facility: CLINIC | Age: 59
End: 2019-08-13
Payer: COMMERCIAL

## 2019-08-13 VITALS
DIASTOLIC BLOOD PRESSURE: 84 MMHG | WEIGHT: 238.5 LBS | HEIGHT: 72 IN | BODY MASS INDEX: 32.3 KG/M2 | HEART RATE: 65 BPM | SYSTOLIC BLOOD PRESSURE: 172 MMHG

## 2019-08-13 VITALS
HEART RATE: 76 BPM | WEIGHT: 241 LBS | BODY MASS INDEX: 33.14 KG/M2 | SYSTOLIC BLOOD PRESSURE: 130 MMHG | DIASTOLIC BLOOD PRESSURE: 78 MMHG

## 2019-08-13 DIAGNOSIS — Z98.890 POST-OPERATIVE STATE: Primary | ICD-10-CM

## 2019-08-13 DIAGNOSIS — I10 ESSENTIAL HYPERTENSION: Primary | ICD-10-CM

## 2019-08-13 DIAGNOSIS — E11.42 TYPE 2 DIABETES MELLITUS WITH PERIPHERAL NEUROPATHY (H): ICD-10-CM

## 2019-08-13 PROCEDURE — 99024 POSTOP FOLLOW-UP VISIT: CPT | Performed by: PODIATRIST

## 2019-08-13 PROCEDURE — 99207 ZZC NO CHARGE NURSE ONLY: CPT

## 2019-08-13 ASSESSMENT — MIFFLIN-ST. JEOR: SCORE: 1936.89

## 2019-08-13 NOTE — TELEPHONE ENCOUNTER
"Requested Prescriptions   Pending Prescriptions Disp Refills     ketoconazole (NIZORAL) 2 % external shampoo [Pharmacy Med Name: KETOCONAZOLE 2% SHAMPOO 120ML]  HISTORICAL 120 mL 0     Sig: APPLY TO THE AFFECTED AREA AND THEN WASH OFF AFTER 5 MINUTES.  Last Written Prescription Date:  NA  Last Fill Quantity: NA,  # refills: NA   Last office visit: 7/10/2019 with prescribing provider:  Jessica   Future Office Visit:   Next 5 appointments (look out 90 days)    Aug 13, 2019 10:30 AM CDT  Return Visit with Jack Younger DPM  [facility] ([facility]) [address] [facility] [address]  [phone]   Sep 03, 2019  3:50 PM CDT  Office Visit with Aden Lopez MD, Cr Rn Pal 3a, CR EXAM ROOM 19  [facility] ([facility]) [address] [place] [id number]  [phone]   Oct 21, 2019  4:00 PM CDT  Office Visit with Aden Lopze MD, [place] Rn Pal 3a, CR EXAM ROOM 19  [facility] ([facility]) [address] [place] [id number]  [phone]   Oct 24, 2019  5:30 PM CDT  (Arrive by 5:15 PM)  Return Visit with SARAH Raymundo CNP  [facility] ([facility]) [address] [place] [address]. [facility] [id number]  [phone]             Antifungal Agents Passed - 8/12/2019  5:59 PM        Passed - Recent (12 mo) or future (30 days) visit within the authorizing provider's specialty     Patient had office visit in the last 12 months or has a visit in the next 30 days with authorizing provider or within the authorizing provider's specialty.  See \"Patient Info\" tab in inbasket, or \"Choose Columns\" in Meds & Orders section of the refill encounter.              Passed - Not Fluconazole or Terconazole      If oral Fluconazole or Terconazole, may refill if indicated in progress notes.           Passed - Medication is active on med list    "

## 2019-08-13 NOTE — PROGRESS NOTES
Crispin Rojas is a 59 year old patient who comes in today for a Blood Pressure check.  Initial BP:  BP (!) 152/74 (BP Location: Left arm, Patient Position: Chair, Cuff Size: Adult Large)   Pulse 76   Wt 109.3 kg (241 lb)   BMI 33.14 kg/m       76  Patient states he ran out of his Lisinopril this morning and plans to  the refill that is ready at the pharmacy this afternoon.    Rechecked blood pressure after letting patient sit for 15 minutes.  BP  130/78  Disposition: follow-up as previously indicated by provider and patient states he has a future appointment scheduled with Dr. Lopez for 9/03/19 for follow up.    Kristie Mares CMA on 8/13/2019 at 3:15 PM

## 2019-08-13 NOTE — LETTER
"    8/13/2019         RE: Crispin Rojas  [address] [facility] [address]        Dear Colleague,    Thank you for referring your patient, Crispin Rojas, to the [facility]. Please see a copy of my visit note below.    SUBJECTIVE:  Pat is here today for initial follow up of his R partial 4th toe amputation for osteomyelitis. Pain is rated as 0/10. No other issues. Pt reports changing the dressing x2 since discharge; it got wet once and he applied iodosorb to the incision.  He is in a post op shoe.  Pt is on Augmentin.  Review of Systems: Denies sob, fevers, chills, current calf pain.  OBJECTIVE:  BP (!) 172/84   Pulse 65   Ht 1.816 m (5' 11.5\")   Wt 108.2 kg (238 lb 8 oz)   BMI 32.80 kg/m     Exam: No apparent distress, relaxed comfortable. Vitals are reviewed from the nursing note.   Vascular: Pulses are palpable on the R. There is adequate capillary fill time to the digits.  Neuro: Gross touch is diminished.  Dermatologic examination: The surgical incision is healing well.   There is no surrounding redness, drainage, nor other signs of infection. There is normal postoperative edema and ecchymosis.    MS: No gross deformity.  No calf pain noted.  Radiographs:  N/a  Reviewed intraop cultures, path.  ASSESSMENT:   1. S/p R 4th toe partial amputation DOS 8/2/19  PLAN:   Discussed condition and treatment options including pros and cons.  Sterile dressing change today.  Keep c/d/i.  Advised he call if it gets wet.  Do not apply anything to incision.  Elevate foot.  WBAT in post op shoe, minimize walking.  F/u 1 wk.  Jack Younger DPM, FACFAS  Weight management plan: Patient was referred to their PCP to discuss a diet and exercise plan.  Crispin to follow up with Primary Care provider regarding elevated blood pressure.        Again, thank you for allowing me to participate in the care of your patient.        Sincerely,        Jack Younger DPM    "

## 2019-08-13 NOTE — PATIENT INSTRUCTIONS
"Thank you for choosing [facility]!    Follow up in 1 week  Keep foot dressing clean, dry, intact  Elevate foot  Minimal weight bearing in post op shoe    DR. HOOKER'S CLINIC LOCATIONS     MONDAY  [place] TUESDAY & FRIDAY AM  [place]   [address] [facility]   [address]   [place] [address] [place] [address]   [phone]  [phone]       WEDNESDAY  [place] SCHEDULE SURGERY: [phone]   [address] [facility] APPOINTMENTS: [phone]   [place] [address] BILLING QUESTIONS: [phone]   [phone]         DIABETES AND YOUR FEET  Diabetes can result in several problems in the feet including ulcers (open sores) and amputations. Two of the most important reasons why people develop foot problems when they have diabetes is : 1. Neuropathy (loss of feeling)  2. Vascular disease (loss or decrease of blood flow).    Neuropathy is a term used to describe a loss of nerve function.  Patients with diabetes are at risk of developing neuropathy if their sugars continue to run high and are above the normal value. One theory for neuropathy is that the \"extra\" sugar in the body enters the nerves and is broken down. These by-products build up in the nerve causing it to swell and impairing nerve function. Often times, this can be prevented by controlling your sugars, dieting and exercise.    When a person develops neuropathy, they usually begin to feel numbness or tingling in their feet and sometime in their legs.  Other symptoms may include painful burning or hot feet, tingling or feeling like insects or ants are crawling on your feet or legs.  If the diabetes is sever and the sugars run high for long periods of time, neuropathy can also occur in the hands.    Vascular disease  is a term used to describe a loss or decrease in circulation (blood flow). There is a problem in getting blood and oxygen to areas that need " it. Similar to neuropathy, sugars can build up in the walls of the arteries (blood vessels) and cause them to become swollen, thickened and hardened. This decreases the amount of blood that can go to an area that needs it. Though this is common in the legs of diabetic patients, it can also affect other arteries (blood vessels) in the body such as in the heart and eyes.    In the legs, vascular disease usually results in cramping. Patients who develop leg cramps after walking the same distance every time (i.e. One block, half a mile, ect.) need to let their doctors know so that their circulation may be checked. Cramps causing severe pain in the feet and/or legs while sleeping and the cramps go away when you stand or hang your legs off the side of the bed, may also be a sign of poor blood circulation.  Occasional cramping in cold weather or on rare occasions with activity may not be due to poor circulation, but you should inform your doctor.    PREVENTION OF THESE DISEASES  The key to prevention is good blood sugar control. Poor blood sugar control is a big reason many of these problems start. Physical activity (exercise) is a very good way to help decrease your blood sugars. Exercise can lower your blood sugar, blood pressure, and cholesterol. It also reduces your risk for heart disease and stroke, relieves stress, and strengthens your heart, muscles and bones.  In addition, regular activity helps insulin work better, improves your blood circulation, and keeps your joints flexible. If you're trying to lose weight, a combination of exercise and wise food choices can help you reach your target weight and maintain it.      PAIN MANAGEMENT  1.Blood Sugar Control - Most important  2. Medications such as:  Amytriptylline, duloxetine, gabapentin, lyrica, tramadol  3. Nutritional therapy:  Vitamin B6 (100mg daily), Vitamin B12 (75mcg daily), Vitamin D 2000 IU daily), Alpha-Lipoic Acid (600-1800mg daily), Acetyl-L-Carnitine  (500-1000mg TID, L-methyl folate (1500mcg daily)    ** Metformin can block Vitamin B6 and B12 so it is important to supplement**    FOOT CARE RECOMMENDATIONS   1. Wash your feet with lukewarm water and a mild soap and then dry them thoroughly, especially between the toes.     2. Examine your feet daily looking for cuts, corns, blisters, cracks, ect, especially after wearing new shoes. Make sure to look between your toes. If you cannot see the bottom of your feet, set a mirror on the floor and hold your foot over it, or ask a spouse, friend or family member to examine your feet for you. Contact your doctor immediately if new problems are noted or if sores are not healing.     3. Immediately apply moisturizer to the tops and bottoms of your feet, avoiding areas between the toes. Hand lotion (Intesive Care, Candie, Eucerin, Neutrogena, Curel, ect) is sufficient unless your doctor prescribes a medicated lotion. Apply sunscreen to your feet when going swimming outside.     4. Use clean comfortable shoes, wear white socks (if you have any bleeding or drainage, you will see it on white socks). Socks should not have thick seams or cut off the circulation around the leg. Break in new shoes slowly and rotate with older shoes until broken in. Check the inside of your shoes with your hand to look for areas of irritation or objects that may have fallen into your shoes.       5. Keep slippers by the side of your bed for use during the night.     6.  Shoes should be fitted by a professional and should not cause areas of irritation.  Check your feet regularly when wearing a new pair of shoes and replace them as needed.     7.  Talk to your doctor about proper exercise. Exercise and stretching stimulate blood flow to your feet and maintain proper glucose levels.     8.  Monitor your blood glucose level as instructed by your doctor. Notify your doctor immediately if your blood sugar is abnormally high or low.    9. Cut your nails  straight across, but then gently round any sharp edges with a cardboard nail file. If you have neuropathy, peripheral vascular disease or cannot see that well to trim your own toenails contact [employer] ([phone]) or [employer] ([phone]).      THINGS TO AVOID DOING   1.  Do not soak your feet if you have an open sore. Use only lukewarm water and always check the temperature with your hand as hot water can easily burn your feet.       2.  Never use a hot water bottle or heating pad on your feet. Also do not apply cold compresses to your feet. With decreased sensation, you could burn or freeze your feet.       3.  Do not apply any of these to your feet:    -  Over the counter medicine for corns or warts    -  Harsh chemicals like boric acid    -  Do not self-treat corns, cuts, blisters or infections. Always consult your doctor.       4.  Do not wear sandals, slippers or walk barefoot, especially on hot sand or concrete or other harsh surfaces.     5.  If you smoke, stop!!!        Crispin to follow up with Primary Care provider regarding elevated blood pressure.      BODY WEIGHT AND YOUR FEET  The following information is included in the after visit summary for all patients. Body weight can be a sensitive issue to discuss in clinic, but we think the following information is very important. Although we focus on the feet and ankles, we do support the overall health of our patients.     Many things can cause foot and ankle problems. Foot structure, activity level, foot mechanics and injuries are common causes of pain. One very important issue that often goes unmentioned, is body weight. Extra weight can cause increased stress on muscles, ligaments, bones and tendons. Sometimes just a few extra pounds is all it takes to put one over her/his threshold. Without reducing that stress, it can be difficult to alleviate pain. As Foot & Ankle specialists, our job is addressing the lower extremity problem and possible  causes. Regarding extra body weight, we encourage patients to discuss diet and weight management plans with their primary care doctors. It is this team approach that gives you the best opportunity for pain relief and getting you back on your feet.      [place] has a Comprehensive Weight Management Program. This program includes counseling, education, non-surgical and surgical approaches to weight loss. If you are interested in learning more either talk to you primary care provider or call [phone].

## 2019-08-13 NOTE — PROGRESS NOTES
"SUBJECTIVE:  Pat is here today for initial follow up of his R partial 4th toe amputation for osteomyelitis. Pain is rated as 0/10. No other issues. Pt reports changing the dressing x2 since discharge; it got wet once and he applied iodosorb to the incision.  He is in a post op shoe.  Pt is on Augmentin.  Review of Systems: Denies sob, fevers, chills, current calf pain.  OBJECTIVE:  BP (!) 172/84   Pulse 65   Ht 1.816 m (5' 11.5\")   Wt 108.2 kg (238 lb 8 oz)   BMI 32.80 kg/m    Exam: No apparent distress, relaxed comfortable. Vitals are reviewed from the nursing note.   Vascular: Pulses are palpable on the R. There is adequate capillary fill time to the digits.  Neuro: Gross touch is diminished.  Dermatologic examination: The surgical incision is healing well.   There is no surrounding redness, drainage, nor other signs of infection. There is normal postoperative edema and ecchymosis.    MS: No gross deformity.  No calf pain noted.  Radiographs:  N/a  Reviewed intraop cultures, path.  ASSESSMENT:   1. S/p R 4th toe partial amputation DOS 8/2/19  PLAN:   Discussed condition and treatment options including pros and cons.  Sterile dressing change today.  Keep c/d/i.  Advised he call if it gets wet.  Do not apply anything to incision.  Elevate foot.  WBAT in post op shoe, minimize walking.  F/u 1 wk.  Jack Younger DPM, FACFAS  Weight management plan: Patient was referred to their PCP to discuss a diet and exercise plan.  Crispin to follow up with Primary Care provider regarding elevated blood pressure.      "

## 2019-08-14 RX ORDER — KETOCONAZOLE 20 MG/ML
SHAMPOO TOPICAL
Qty: 120 ML | Refills: 0 | Status: SHIPPED | OUTPATIENT
Start: 2019-08-14 | End: 2019-10-23

## 2019-08-14 NOTE — TELEPHONE ENCOUNTER
Routing refill request to provider to review approval because:  Hospital discontinued 7/7/19, please confirm ongoing.   TAMARA Green, RN  Message handled by Nurse Triage

## 2019-08-20 ENCOUNTER — OFFICE VISIT (OUTPATIENT)
Dept: PODIATRY | Facility: CLINIC | Age: 59
End: 2019-08-20
Payer: COMMERCIAL

## 2019-08-20 VITALS
HEIGHT: 72 IN | DIASTOLIC BLOOD PRESSURE: 79 MMHG | SYSTOLIC BLOOD PRESSURE: 157 MMHG | BODY MASS INDEX: 32.48 KG/M2 | WEIGHT: 239.8 LBS | HEART RATE: 64 BPM

## 2019-08-20 DIAGNOSIS — Z98.890 POST-OPERATIVE STATE: Primary | ICD-10-CM

## 2019-08-20 PROCEDURE — 99024 POSTOP FOLLOW-UP VISIT: CPT | Performed by: PODIATRIST

## 2019-08-20 ASSESSMENT — MIFFLIN-ST. JEOR: SCORE: 1942.79

## 2019-08-20 NOTE — LETTER
"    8/20/2019         RE: Crispin Rojas  [address] [facility] [address]        Dear Colleague,    Thank you for referring your patient, Crispin Rojas, to the [facility]. Please see a copy of my visit note below.    SUBJECTIVE:  Pat is here today for initial follow up of his R partial 4th toe amputation for osteomyelitis. Pain is rated as 0/10. No other issues. Pt reports changing the dressing since the last visit.  He is in a post op shoe.    Review of Systems: Denies sob, fevers, chills, calf pain.  OBJECTIVE:  BP (!) 157/79   Pulse 64   Ht 1.816 m (5' 11.5\")   Wt 108.8 kg (239 lb 12.8 oz)   BMI 32.98 kg/m     Exam: No apparent distress, relaxed comfortable. Vitals are reviewed from the nursing note.   Vascular: Pulses are palpable on the R. There is adequate capillary fill time to the digits.  Neuro: Gross touch is diminished.  Dermatologic examination: The surgical incision is healed. There is no surrounding redness, drainage, nor other signs of infection.  Small area of skin irritation to dorsal R 2nd toe.  MS: No gross deformity.  No calf pain noted.  Radiographs:  N/a  ASSESSMENT:   1. S/p R 4th toe partial amputation DOS 8/2/19  PLAN:   Discussed condition and treatment options including pros and cons.  Sutures removed today.  Pt can get foot wet starting tomorrow.  No soaking for a week.  Transition back to protective shoes/inserts.  Pt declined new DM shoes/inserts.  Check feet daily.  Discussed risk of new ulcers.  F/u in 4 wks.  Jack Younger DPM, FACFAS  Weight management plan: Patient was referred to their PCP to discuss a diet and exercise plan.   Crispin to follow up with Primary Care provider regarding elevated blood pressure.        Again, thank you for allowing me to participate in the care of your patient.        Sincerely,        Jack Younger DPM    "

## 2019-08-20 NOTE — PROGRESS NOTES
"SUBJECTIVE:  Pat is here today for initial follow up of his R partial 4th toe amputation for osteomyelitis. Pain is rated as 0/10. No other issues. Pt reports changing the dressing since the last visit.  He is in a post op shoe.    Review of Systems: Denies sob, fevers, chills, calf pain.  OBJECTIVE:  BP (!) 157/79   Pulse 64   Ht 1.816 m (5' 11.5\")   Wt 108.8 kg (239 lb 12.8 oz)   BMI 32.98 kg/m    Exam: No apparent distress, relaxed comfortable. Vitals are reviewed from the nursing note.   Vascular: Pulses are palpable on the R. There is adequate capillary fill time to the digits.  Neuro: Gross touch is diminished.  Dermatologic examination: The surgical incision is healed. There is no surrounding redness, drainage, nor other signs of infection.  Small area of skin irritation to dorsal R 2nd toe.  MS: No gross deformity.  No calf pain noted.  Radiographs:  N/a  ASSESSMENT:   1. S/p R 4th toe partial amputation DOS 8/2/19  PLAN:   Discussed condition and treatment options including pros and cons.  Sutures removed today.  Pt can get foot wet starting tomorrow.  No soaking for a week.  Transition back to protective shoes/inserts.  Pt declined new DM shoes/inserts.  Check feet daily.  Discussed risk of new ulcers.  F/u in 4 wks.  Jack Younger DPM, FACFAS  Weight management plan: Patient was referred to their PCP to discuss a diet and exercise plan.   Crispin to follow up with Primary Care provider regarding elevated blood pressure.      "

## 2019-09-24 ENCOUNTER — OFFICE VISIT (OUTPATIENT)
Dept: PODIATRY | Facility: CLINIC | Age: 59
End: 2019-09-24
Payer: COMMERCIAL

## 2019-09-24 VITALS
BODY MASS INDEX: 32.78 KG/M2 | HEART RATE: 89 BPM | SYSTOLIC BLOOD PRESSURE: 138 MMHG | DIASTOLIC BLOOD PRESSURE: 75 MMHG | WEIGHT: 242 LBS | HEIGHT: 72 IN

## 2019-09-24 DIAGNOSIS — E11.42 TYPE 2 DIABETES MELLITUS WITH PERIPHERAL NEUROPATHY (H): ICD-10-CM

## 2019-09-24 DIAGNOSIS — L84 PRE-ULCERATIVE CORN OR CALLOUS: Primary | ICD-10-CM

## 2019-09-24 PROCEDURE — 11055 PARING/CUTG B9 HYPRKER LES 1: CPT | Mod: Q7 | Performed by: PODIATRIST

## 2019-09-24 PROCEDURE — 99213 OFFICE O/P EST LOW 20 MIN: CPT | Mod: 24 | Performed by: PODIATRIST

## 2019-09-24 ASSESSMENT — MIFFLIN-ST. JEOR: SCORE: 1956.73

## 2019-09-24 NOTE — PROGRESS NOTES
"PATIENT HISTORY:  Crispin Rojas is a 59 year old male who presents to clinic for recheck of feet.  Recent R foot partial 4th toe amputation for osteomyelitis.  DOS 8/2/19.  New issue reported today.  Reports L 2nd toe wound since last visit that healed.  Also with some dark areas to R 2nd toe, near 1st toe.  No drainage reported.  Pt works several jobs on his feet.  He finds that even DM shoes and inserts don't help take the pressure off enough.  No fevers, chills, calf pain.  No related family hx.      Pt 20 mins late to visit today but we worked him in.     EXAM:Vitals: /75   Pulse 89   Ht 1.822 m (5' 11.75\")   Wt 109.8 kg (242 lb)   BMI 33.05 kg/m    BMI= Body mass index is 33.05 kg/m .    General appearance: Patient is alert and fully cooperative with history & exam.  No sign of distress is noted during the visit.     Dermatologic: Tip of R 2nd toe with preulcerative callus, intralesional bleeding.  Small dry eschar to plantar R foot just distal to 1st MPJ.  No other wounds b/l.   No paronychia or evidence of soft tissue infection is noted.  R 4th toe remains healed.     Vascular: DP & PT pulses are intact & regular bilaterally.  No significant edema or varicosities noted.  CFT and skin temperature are normal to both lower extremities.     Neurologic: Lower extremity sensation is diminished to light touch b/l.     Musculoskeletal: s/p b/l digital amputations (L 1st, R 1st and 4th).  Hammertoe deformities noted, R 2nd toe is not entirely reducible.  Patient is ambulatory without assistive device or brace.  No gross ankle deformity noted.  No foot or ankle joint effusion is noted.     ASSESSMENT:   R foot preulcerative callus  DM II with neuropathy  S/p R partial 4th toe amputation     PLAN:  Reviewed patient's chart in epic.  Discussed condition and treatment options including pros and cons.    With consent I debrided the preulcerative callus on the R 2nd toe.  No wound today.  Dry eschar left " intact.    Discussed risk of new ulcer, infection, amputation.  Remaining toes will be under more pressure given prior toe amputations.  Considered tenotomy for R 2nd toe but given toe is not reducible this probably won't help.    F/u immediately for any new wounds.    I don't think there is a shoe/insert combination that would protect his feet enough with the amount of walking he does for work.  This was discussed.  Ideally he would find a more seated job.  Pt will wear shoe/insert combination he feels is most protective.     Check feet daily.    F/u 1 month.    Jack Younger DPM, FACFAS    Weight management plan: Patient was referred to their PCP to discuss a diet and exercise plan.

## 2019-09-24 NOTE — PATIENT INSTRUCTIONS
"Thank you for choosing [facility]!    Follow up in 1 month    DR. HOOKER'S CLINIC LOCATIONS     MONDAY  [place] TUESDAY & FRIDAY AM  [place]   [facility]   [address]   [place] [address] EM[place] [address]   [phone]  [phone]  [phone]       WEDNESDAY  [place] SCHEDULE SURGERY: [phone]   [address] [place] [address] APPOINTMENTS: [phone]   [place] [address] BILLING QUESTIONS: [phone]   [phone]         DIABETES AND YOUR FEET  Diabetes can result in several problems in the feet including ulcers (open sores) and amputations. Two of the most important reasons why people develop foot problems when they have diabetes is : 1. Neuropathy (loss of feeling)  2. Vascular disease (loss or decrease of blood flow).    Neuropathy is a term used to describe a loss of nerve function.  Patients with diabetes are at risk of developing neuropathy if their sugars continue to run high and are above the normal value. One theory for neuropathy is that the \"extra\" sugar in the body enters the nerves and is broken down. These by-products build up in the nerve causing it to swell and impairing nerve function. Often times, this can be prevented by controlling your sugars, dieting and exercise.    When a person develops neuropathy, they usually begin to feel numbness or tingling in their feet and sometime in their legs.  Other symptoms may include painful burning or hot feet, tingling or feeling like insects or ants are crawling on your feet or legs.  If the diabetes is sever and the sugars run high for long periods of time, neuropathy can also occur in the hands.    Vascular disease  is a term used to describe a loss or decrease in circulation (blood flow). There is a problem in getting blood and oxygen to areas that need it. Similar to neuropathy, sugars can build up in the walls of the arteries (blood vessels) " and cause them to become swollen, thickened and hardened. This decreases the amount of blood that can go to an area that needs it. Though this is common in the legs of diabetic patients, it can also affect other arteries (blood vessels) in the body such as in the heart and eyes.    In the legs, vascular disease usually results in cramping. Patients who develop leg cramps after walking the same distance every time (i.e. One block, half a mile, ect.) need to let their doctors know so that their circulation may be checked. Cramps causing severe pain in the feet and/or legs while sleeping and the cramps go away when you stand or hang your legs off the side of the bed, may also be a sign of poor blood circulation.  Occasional cramping in cold weather or on rare occasions with activity may not be due to poor circulation, but you should inform your doctor.    PREVENTION OF THESE DISEASES  The key to prevention is good blood sugar control. Poor blood sugar control is a big reason many of these problems start. Physical activity (exercise) is a very good way to help decrease your blood sugars. Exercise can lower your blood sugar, blood pressure, and cholesterol. It also reduces your risk for heart disease and stroke, relieves stress, and strengthens your heart, muscles and bones.  In addition, regular activity helps insulin work better, improves your blood circulation, and keeps your joints flexible. If you're trying to lose weight, a combination of exercise and wise food choices can help you reach your target weight and maintain it.      PAIN MANAGEMENT  1.Blood Sugar Control - Most important  2. Medications such as:  Amytriptylline, duloxetine, gabapentin, lyrica, tramadol  3. Nutritional therapy:  Vitamin B6 (100mg daily), Vitamin B12 (75mcg daily), Vitamin D 2000 IU daily), Alpha-Lipoic Acid (600-1800mg daily), Acetyl-L-Carnitine (500-1000mg TID, L-methyl folate (1500mcg daily)    ** Metformin can block Vitamin B6 and B12  so it is important to supplement**    FOOT CARE RECOMMENDATIONS   1. Wash your feet with lukewarm water and a mild soap and then dry them thoroughly, especially between the toes.     2. Examine your feet daily looking for cuts, corns, blisters, cracks, ect, especially after wearing new shoes. Make sure to look between your toes. If you cannot see the bottom of your feet, set a mirror on the floor and hold your foot over it, or ask a spouse, friend or family member to examine your feet for you. Contact your doctor immediately if new problems are noted or if sores are not healing.     3. Immediately apply moisturizer to the tops and bottoms of your feet, avoiding areas between the toes. Hand lotion (Intesive Care, Candie, Eucerin, Neutrogena, Curel, ect) is sufficient unless your doctor prescribes a medicated lotion. Apply sunscreen to your feet when going swimming outside.     4. Use clean comfortable shoes, wear white socks (if you have any bleeding or drainage, you will see it on white socks). Socks should not have thick seams or cut off the circulation around the leg. Break in new shoes slowly and rotate with older shoes until broken in. Check the inside of your shoes with your hand to look for areas of irritation or objects that may have fallen into your shoes.       5. Keep slippers by the side of your bed for use during the night.     6.  Shoes should be fitted by a professional and should not cause areas of irritation.  Check your feet regularly when wearing a new pair of shoes and replace them as needed.     7.  Talk to your doctor about proper exercise. Exercise and stretching stimulate blood flow to your feet and maintain proper glucose levels.     8.  Monitor your blood glucose level as instructed by your doctor. Notify your doctor immediately if your blood sugar is abnormally high or low.    9. Cut your nails straight across, but then gently round any sharp edges with a cardboard nail file. If you have  neuropathy, peripheral vascular disease or cannot see that well to trim your own toenails contact [facility] ([phone]) or [facility] ([phone]).      THINGS TO AVOID DOING   1.  Do not soak your feet if you have an open sore. Use only lukewarm water and always check the temperature with your hand as hot water can easily burn your feet.       2.  Never use a hot water bottle or heating pad on your feet. Also do not apply cold compresses to your feet. With decreased sensation, you could burn or freeze your feet.       3.  Do not apply any of these to your feet:    -  Over the counter medicine for corns or warts    -  Harsh chemicals like boric acid    -  Do not self-treat corns, cuts, blisters or infections. Always consult your doctor.       4.  Do not wear sandals, slippers or walk barefoot, especially on hot sand or concrete or other harsh surfaces.     5.  If you smoke, stop!!!      Crispin to follow up with Primary Care provider regarding elevated blood pressure.        BODY WEIGHT AND YOUR FEET  The following information is included in the after visit summary for all patients. Body weight can be a sensitive issue to discuss in clinic, but we think the following information is very important. Although we focus on the feet and ankles, we do support the overall health of our patients.     Many things can cause foot and ankle problems. Foot structure, activity level, foot mechanics and injuries are common causes of pain. One very important issue that often goes unmentioned, is body weight. Extra weight can cause increased stress on muscles, ligaments, bones and tendons. Sometimes just a few extra pounds is all it takes to put one over her/his threshold. Without reducing that stress, it can be difficult to alleviate pain. As Foot & Ankle specialists, our job is addressing the lower extremity problem and possible causes. Regarding extra body weight, we encourage patients to discuss diet and weight management  plans with their primary care doctors. It is this team approach that gives you the best opportunity for pain relief and getting you back on your feet.      [facility] has a Comprehensive Weight Management Program. This program includes counseling, education, non-surgical and surgical approaches to weight loss. If you are interested in learning more either talk to you primary care provider or call [phone].

## 2019-09-24 NOTE — LETTER
"    9/24/2019         RE: Crispin Rojas  [address] [facility] [address]        Dear Colleague,    Thank you for referring your patient, Crispin Rojas, to the [facility]. Please see a copy of my visit note below.    PATIENT HISTORY:  Crispin Rojas is a 59 year old male who presents to clinic for recheck of feet.  Recent R foot partial 4th toe amputation for osteomyelitis.  DOS 8/2/19.  New issue reported today.  Reports L 2nd toe wound since last visit that healed.  Also with some dark areas to R 2nd toe, near 1st toe.  No drainage reported.  Pt works several jobs on his feet.  He finds that even DM shoes and inserts don't help take the pressure off enough.  No fevers, chills, calf pain.  No related family hx.      Pt 20 mins late to visit today but we worked him in.     EXAM:Vitals: /75   Pulse 89   Ht 1.822 m (5' 11.75\")   Wt 109.8 kg (242 lb)   BMI 33.05 kg/m     BMI= Body mass index is 33.05 kg/m .    General appearance: Patient is alert and fully cooperative with history & exam.  No sign of distress is noted during the visit.     Dermatologic: Tip of R 2nd toe with preulcerative callus, intralesional bleeding.  Small dry eschar to plantar R foot just distal to 1st MPJ.  No other wounds b/l.   No paronychia or evidence of soft tissue infection is noted.  R 4th toe remains healed.     Vascular: DP & PT pulses are intact & regular bilaterally.  No significant edema or varicosities noted.  CFT and skin temperature are normal to both lower extremities.     Neurologic: Lower extremity sensation is intact to light touch.  No evidence of weakness or contracture in the lower extremities.  No evidence of neuropathy.     Musculoskeletal: s/p b/l digital amputations (L 1st, R 1st and 4th).  Hammertoe deformities noted, R 2nd toe is not entirely reducible.  Patient is ambulatory without assistive device or brace.  No gross ankle deformity noted.  No foot or ankle joint " effusion is noted.     ASSESSMENT:   R foot preulcerative callus  DM II with neuropathy  S/p R partial 4th toe amputation     PLAN:  Reviewed patient's chart in epic.  Discussed condition and treatment options including pros and cons.    With consent I debrided the preulcerative callus on the R 2nd toe.  No wound today.  Dry eschar left intact.    Discussed risk of new ulcer, infection, amputation.  Remaining toes will be under more pressure given prior toe amputations.  Considered tenotomy for R 2nd toe but given toe is not reducible this probably won't help.    F/u immediately for any new wounds.    I don't think there is a shoe/insert combination that would protect his feet enough with the amount of walking he does for work.  This was discussed.  Ideally he would find a more seated job.  Pt will wear shoe/insert combination he feels is most protective.     Check feet daily.    F/u 1 month.    Jack Younger DPM, FACFAS    Weight management plan: Patient was referred to their PCP to discuss a diet and exercise plan.          Again, thank you for allowing me to participate in the care of your patient.        Sincerely,        Jack Younger DPM

## 2019-09-26 DIAGNOSIS — G45.8 OTHER SPECIFIED TRANSIENT CEREBRAL ISCHEMIAS: ICD-10-CM

## 2019-09-26 RX ORDER — AMLODIPINE BESYLATE 5 MG/1
TABLET ORAL
Qty: 90 TABLET | Refills: 0 | Status: SHIPPED | OUTPATIENT
Start: 2019-09-26 | End: 2020-02-03

## 2019-09-26 NOTE — TELEPHONE ENCOUNTER
"Routing refill request to provider for review/approval because:  Labs out of range:  Cr  Last OV 7/10/19            Requested Prescriptions   Pending Prescriptions Disp Refills     amLODIPine (NORVASC) 5 MG tablet [Pharmacy Med Name: AMLODIPINE BESYLATE 5MG TABLETS] 60 tablet 0     Sig: TAKE 1 TABLET BY MOUTH DAILY       Calcium Channel Blockers Protocol  Failed - 9/26/2019 12:51 PM        Failed - Normal serum creatinine on file in past 12 months     Recent Labs   Lab Test 08/03/19  0547  01/25/16  0704   CR 0.53*   < >  --    CREAT  --   --  0.8    < > = values in this interval not displayed.             Passed - Blood pressure under 140/90 in past 12 months     BP Readings from Last 3 Encounters:   09/24/19 138/75   08/20/19 (!) 157/79   08/13/19 130/78                 Passed - Recent (12 mo) or future (30 days) visit within the authorizing provider's specialty     Patient had office visit in the last 12 months or has a visit in the next 30 days with authorizing provider or within the authorizing provider's specialty.  See \"Patient Info\" tab in inbasket, or \"Choose Columns\" in Meds & Orders section of the refill encounter.              Passed - Medication is active on med list        Passed - Patient is age 18 or older          "

## 2019-09-27 ENCOUNTER — HEALTH MAINTENANCE LETTER (OUTPATIENT)
Age: 59
End: 2019-09-27

## 2019-10-18 NOTE — PROGRESS NOTES
"Pre-Visit Planning     Future Appointments   Date Time Provider Department Center   10/21/2019  4:00 PM Aden Lopez MD [facility]   10/24/2019  5:30 PM Jasmin Arteaga APRN CNP [facility] CR   11/5/2019  5:00 PM Jack Younger DPM [facility] CS   12/4/2019  3:00 PM David Evans MD [facility]     Appointment Notes for this encounter:   F/u, patient wants flu shot    Questionnaires Reviewed/Assigned  Additional questionnaires assigned        Patient preferred phone number: [phone]    Spoke to patient via phone. Patient does not have additional questions or concerns.        Visit is not preventive.    Health Maintenance Due   Topic Date Due     ANNUAL REVIEW OF HM ORDERS  1960     HIV SCREENING  03/17/1977     ZOSTER IMMUNIZATION (1 of 2) 03/17/2012     MICHELLE ASSESSMENT  05/09/2019     PHQ-9  05/09/2019     LIPID  07/10/2019     INFLUENZA VACCINE (1) 09/01/2019     A1C  11/01/2019     DIABETIC FOOT EXAM  11/14/2019     Patient is due for:  flu vaccine  No appointment needed.    [facility]  Patient is active on [facility].    Questionnaire Review   Advised patient to arrive early in order to complete questionnaires.    Call Summary  \"Thank you for your time today.  If anything comes up before your appointment, please feel free to contact us at [phone].\"  Ivette Steven, JOSEN, RN, PHN      "

## 2019-10-21 ENCOUNTER — OFFICE VISIT (OUTPATIENT)
Dept: FAMILY MEDICINE | Facility: CLINIC | Age: 59
End: 2019-10-21
Payer: COMMERCIAL

## 2019-10-21 VITALS
OXYGEN SATURATION: 96 % | BODY MASS INDEX: 31.97 KG/M2 | RESPIRATION RATE: 16 BRPM | HEART RATE: 56 BPM | SYSTOLIC BLOOD PRESSURE: 137 MMHG | WEIGHT: 236 LBS | TEMPERATURE: 97.4 F | DIASTOLIC BLOOD PRESSURE: 74 MMHG | HEIGHT: 72 IN

## 2019-10-21 DIAGNOSIS — E11.42 TYPE 2 DIABETES MELLITUS WITH PERIPHERAL NEUROPATHY (H): ICD-10-CM

## 2019-10-21 DIAGNOSIS — E78.5 HYPERLIPIDEMIA LDL GOAL <100: ICD-10-CM

## 2019-10-21 DIAGNOSIS — Z11.4 SCREENING FOR HUMAN IMMUNODEFICIENCY VIRUS: ICD-10-CM

## 2019-10-21 DIAGNOSIS — L97.508 DIABETIC ULCER OF TOE ASSOCIATED WITH TYPE 2 DIABETES MELLITUS, WITH OTHER ULCER SEVERITY, UNSPECIFIED LATERALITY (H): ICD-10-CM

## 2019-10-21 DIAGNOSIS — E11.621 DIABETIC ULCER OF TOE ASSOCIATED WITH TYPE 2 DIABETES MELLITUS, WITH OTHER ULCER SEVERITY, UNSPECIFIED LATERALITY (H): ICD-10-CM

## 2019-10-21 DIAGNOSIS — Z23 ENCOUNTER FOR IMMUNIZATION: ICD-10-CM

## 2019-10-21 DIAGNOSIS — Z23 NEED FOR PROPHYLACTIC VACCINATION AND INOCULATION AGAINST INFLUENZA: Primary | ICD-10-CM

## 2019-10-21 LAB — HBA1C MFR BLD: 5.9 % (ref 0–5.6)

## 2019-10-21 PROCEDURE — 90682 RIV4 VACC RECOMBINANT DNA IM: CPT | Performed by: FAMILY MEDICINE

## 2019-10-21 PROCEDURE — 90750 HZV VACC RECOMBINANT IM: CPT | Performed by: FAMILY MEDICINE

## 2019-10-21 PROCEDURE — 99214 OFFICE O/P EST MOD 30 MIN: CPT | Mod: 25 | Performed by: FAMILY MEDICINE

## 2019-10-21 PROCEDURE — 90471 IMMUNIZATION ADMIN: CPT | Performed by: FAMILY MEDICINE

## 2019-10-21 PROCEDURE — 99207 C FOOT EXAM  NO CHARGE: CPT | Mod: 25 | Performed by: FAMILY MEDICINE

## 2019-10-21 PROCEDURE — 87389 HIV-1 AG W/HIV-1&-2 AB AG IA: CPT | Performed by: FAMILY MEDICINE

## 2019-10-21 PROCEDURE — 90472 IMMUNIZATION ADMIN EACH ADD: CPT | Performed by: FAMILY MEDICINE

## 2019-10-21 PROCEDURE — 80061 LIPID PANEL: CPT | Performed by: FAMILY MEDICINE

## 2019-10-21 PROCEDURE — 36415 COLL VENOUS BLD VENIPUNCTURE: CPT | Performed by: FAMILY MEDICINE

## 2019-10-21 PROCEDURE — 83036 HEMOGLOBIN GLYCOSYLATED A1C: CPT | Performed by: FAMILY MEDICINE

## 2019-10-21 ASSESSMENT — PATIENT HEALTH QUESTIONNAIRE - PHQ9
SUM OF ALL RESPONSES TO PHQ QUESTIONS 1-9: 4
10. IF YOU CHECKED OFF ANY PROBLEMS, HOW DIFFICULT HAVE THESE PROBLEMS MADE IT FOR YOU TO DO YOUR WORK, TAKE CARE OF THINGS AT HOME, OR GET ALONG WITH OTHER PEOPLE: SOMEWHAT DIFFICULT
SUM OF ALL RESPONSES TO PHQ QUESTIONS 1-9: 4

## 2019-10-21 ASSESSMENT — ANXIETY QUESTIONNAIRES
GAD7 TOTAL SCORE: 0
2. NOT BEING ABLE TO STOP OR CONTROL WORRYING: NOT AT ALL
1. FEELING NERVOUS, ANXIOUS, OR ON EDGE: NOT AT ALL
3. WORRYING TOO MUCH ABOUT DIFFERENT THINGS: NOT AT ALL
7. FEELING AFRAID AS IF SOMETHING AWFUL MIGHT HAPPEN: NOT AT ALL
GAD7 TOTAL SCORE: 0
5. BEING SO RESTLESS THAT IT IS HARD TO SIT STILL: NOT AT ALL
GAD7 TOTAL SCORE: 0
7. FEELING AFRAID AS IF SOMETHING AWFUL MIGHT HAPPEN: NOT AT ALL
6. BECOMING EASILY ANNOYED OR IRRITABLE: NOT AT ALL
4. TROUBLE RELAXING: NOT AT ALL

## 2019-10-21 ASSESSMENT — MIFFLIN-ST. JEOR: SCORE: 1925.55

## 2019-10-21 NOTE — PROGRESS NOTES
Subjective     Crispin Rojas is a 59 year old male who presents to clinic today for the following health issues:    HPI   Hypertension Follow-up, diabetes and pvd  With recent podiatric issues with foot ulcerations       Do you check your blood pressure regularly outside of the clinic? No     Are you following a low salt diet? No    Are your blood pressures ever more than 140 on the top number (systolic) OR more   than 90 on the bottom number (diastolic), for example 140/90? No      How many servings of fruits and vegetables do you eat daily?  2-3    On average, how many sweetened beverages do you drink each day (soda, juice, sweet tea, etc)?   0    How many days per week do you miss taking your medication? 0            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     CARDIOVASCULAR SCREENING; LDL GOAL LESS THAN 100     Morbid obesity, unspecified obesity type (H)     Other specified transient cerebral ischemias     Calculus of kidney     Chronic left shoulder pain     Cervicalgia     Type 2 diabetes mellitus with peripheral neuropathy (H)     Hyperlipidemia LDL goal <100     Essential hypertension     Right bundle branch block     GILA (obstructive sleep apnea)     Thoracic aortic aneurysm without rupture (H)     Cellulitis     Cholecystitis     Osteomyelitis (H)     Past Surgical History:   Procedure Laterality Date     AMPUTATE FOOT Left 8/18/2016    Procedure: AMPUTATE FOOT;  Surgeon: Jack Younger DPM;  Location: [facility] OR     AMPUTATE TOE(S) Right 2/1/2016    Procedure: AMPUTATE TOE(S);  Surgeon: Rachelle Manrqiuez DPM, Pod;  Location: [facility] OR     AMPUTATE TOE(S) Right 8/2/2019    Procedure: Right fourth toe partial amputation for treatment of osteomyelitis.;  Surgeon: Jack Younger DPM;  Location: [facility] OR     ANGIOGRAM       COLONOSCOPY       COMBINED CYSTOSCOPY, RETROGRADES, URETEROSCOPY, INSERT STENT Left 8/21/2016    Procedure: COMBINED CYSTOSCOPY,  "RETROGRADES, URETEROSCOPY, INSERT STENT;  Surgeon: Artemio Valenzuela MD;  Location: [facility] OR     COMBINED CYSTOSCOPY, RETROGRADES, URETEROSCOPY, LASER HOLMIUM LITHOTRIPSY URETER(S), INSERT STENT Left 10/3/2016    Procedure: COMBINED CYSTOSCOPY, RETROGRADES, URETEROSCOPY, LASER HOLMIUM LITHOTRIPSY URETER(S), INSERT STENT;  Surgeon: Artemio Valenzuela MD;  Location: [facility] OR     EXTRACORPOREAL SHOCK WAVE LITHOTRIPSY (ESWL) Left 9/8/2016    Procedure: EXTRACORPOREAL SHOCK WAVE LITHOTRIPSY (ESWL);  Surgeon: Artemio Valenzuela MD;  Location: [facility] OR     RECESSION GASTROCNEMIUS Right 2/1/2016    Procedure: RECESSION GASTROCNEMIUS;  Surgeon: Rachelle Manriquez, DPM, Pod;  Location: [facility] OR       Social History     Tobacco Use     Smoking status: Never Smoker     Smokeless tobacco: Never Used   Substance Use Topics     Alcohol use: Yes     Alcohol/week: 0.0 standard drinks     Comment: rare---red wine 2x per month     Family History   Problem Relation Age of Onset     Arthritis Mother         SLE     Connective Tissue Disorder Mother         lupus     Diabetes Mother      Cerebrovascular Disease Mother      Cancer Mother      Diabetes Father      Diabetes Maternal Grandfather      Diabetes Sister            Reviewed and updated as needed this visit by Provider         Review of Systems   ROS COMP: Constitutional, HEENT, cardiovascular, pulmonary, GI, , musculoskeletal, neuro, skin, endocrine and psych systems are negative, except as otherwise noted.      Objective    /74 (BP Location: Right arm, Patient Position: Chair, Cuff Size: Adult Large)   Pulse 56   Temp 97.4  F (36.3  C) (Oral)   Resp 16   Ht 1.816 m (5' 11.5\")   Wt 107 kg (236 lb)   SpO2 96%   BMI 32.46 kg/m    Body mass index is 32.46 kg/m .  Physical Exam   GENERAL: healthy, alert and no distress  EYES: Eyes grossly normal to inspection, PERRL and conjunctivae and sclerae normal  HENT: ear canals and TM's normal, nose and mouth without ulcers or " "lesions  NECK: no adenopathy, no asymmetry, masses, or scars and thyroid normal to palpation  RESP: lungs clear to auscultation - no rales, rhonchi or wheezes  CV: regular rate and rhythm, normal S1 S2, no S3 or S4, no murmur, click or rub, no peripheral edema and peripheral pulses strong  ABDOMEN: soft, nontender, no hepatosplenomegaly, no masses and bowel sounds normal  MS: no gross musculoskeletal defects noted, no edema  SKIN: no suspicious lesions or rashes  NEURO: Normal strength and tone, mentation intact and speech normal  PSYCH: mentation appears normal, affect normal/bright    Diagnostic Test Results:  Labs reviewed in Epic        Assessment & Plan     1. Type 2 diabetes mellitus with peripheral neuropathy (H)    - HEMOGLOBIN A1C  - FOOT EXAM    2. Hyperlipidemia LDL goal <100    - Lipid panel reflex to direct LDL Fasting    3. Encounter for immunization    - ZOSTER VACCINE RECOMBINANT ADJUVANTED IM NJX    4. Screening for human immunodeficiency virus    - HIV Screening    5. Need for prophylactic vaccination and inoculation against influenza    - INFLUENZA QUAD, RECOMBINANT, P-FREE (RIV4) (FLUBLOCK) [02156]  - Vaccine Administration, Initial [48314]  - Vaccine Administration, Each Additional [80557]    6. Diabetic ulcer of toe associated with type 2 diabetes mellitus, with other ulcer severity, unspecified laterality (H)         BMI:   Estimated body mass index is 32.46 kg/m  as calculated from the following:    Height as of this encounter: 1.816 m (5' 11.5\").    Weight as of this encounter: 107 kg (236 lb).   Weight management plan: Discussed healthy diet and exercise guidelines        Work on weight loss    No follow-ups on file.    Aden Lopez MD  [facility]    Answers for HPI/ROS submitted by the patient on 10/21/2019   If you checked off any problems, how difficult have these problems made it for you to do your work, take care of things at home, or get along with other " people?: Somewhat difficult  PHQ9 TOTAL SCORE: 4  MICHELLE 7 TOTAL SCORE: 0

## 2019-10-22 LAB
CHOLEST SERPL-MCNC: 109 MG/DL
HDLC SERPL-MCNC: 38 MG/DL
LDLC SERPL CALC-MCNC: 55 MG/DL
NONHDLC SERPL-MCNC: 71 MG/DL
TRIGL SERPL-MCNC: 78 MG/DL

## 2019-10-22 ASSESSMENT — PATIENT HEALTH QUESTIONNAIRE - PHQ9: SUM OF ALL RESPONSES TO PHQ QUESTIONS 1-9: 4

## 2019-10-22 ASSESSMENT — ANXIETY QUESTIONNAIRES: GAD7 TOTAL SCORE: 0

## 2019-10-23 DIAGNOSIS — L21.9 SEBORRHEIC DERMATITIS: ICD-10-CM

## 2019-10-23 LAB — HIV 1+2 AB+HIV1 P24 AG SERPL QL IA: NONREACTIVE

## 2019-10-24 ENCOUNTER — OFFICE VISIT (OUTPATIENT)
Dept: ENDOCRINOLOGY | Facility: CLINIC | Age: 59
End: 2019-10-24
Payer: COMMERCIAL

## 2019-10-24 VITALS
HEART RATE: 65 BPM | DIASTOLIC BLOOD PRESSURE: 62 MMHG | BODY MASS INDEX: 32.87 KG/M2 | RESPIRATION RATE: 14 BRPM | WEIGHT: 239 LBS | SYSTOLIC BLOOD PRESSURE: 144 MMHG

## 2019-10-24 DIAGNOSIS — E78.5 HYPERLIPIDEMIA LDL GOAL <100: Primary | ICD-10-CM

## 2019-10-24 DIAGNOSIS — E11.49 DIABETES MELLITUS TYPE 2 WITH NEUROLOGICAL MANIFESTATIONS (H): ICD-10-CM

## 2019-10-24 DIAGNOSIS — I10 BENIGN ESSENTIAL HYPERTENSION: ICD-10-CM

## 2019-10-24 DIAGNOSIS — E11.52 TYPE 2 DIABETES MELLITUS WITH DIABETIC PERIPHERAL ANGIOPATHY AND GANGRENE, WITH LONG-TERM CURRENT USE OF INSULIN (H): ICD-10-CM

## 2019-10-24 DIAGNOSIS — Z79.4 TYPE 2 DIABETES MELLITUS WITH DIABETIC PERIPHERAL ANGIOPATHY AND GANGRENE, WITH LONG-TERM CURRENT USE OF INSULIN (H): ICD-10-CM

## 2019-10-24 PROCEDURE — 99214 OFFICE O/P EST MOD 30 MIN: CPT | Performed by: CLINICAL NURSE SPECIALIST

## 2019-10-24 RX ORDER — METFORMIN HCL 500 MG
TABLET, EXTENDED RELEASE 24 HR ORAL
Qty: 360 TABLET | Refills: 3 | Status: SHIPPED | OUTPATIENT
Start: 2019-10-24 | End: 2020-05-07

## 2019-10-24 RX ORDER — INSULIN LISPRO 100 [IU]/ML
INJECTION, SOLUTION INTRAVENOUS; SUBCUTANEOUS
Qty: 45 ML | Refills: 3 | Status: SHIPPED | OUTPATIENT
Start: 2019-10-24 | End: 2020-05-07

## 2019-10-24 RX ORDER — KETOCONAZOLE 20 MG/ML
SHAMPOO TOPICAL
Qty: 120 ML | Refills: 3 | Status: SHIPPED | OUTPATIENT
Start: 2019-10-24 | End: 2020-07-21

## 2019-10-24 NOTE — LETTER
10/24/2019         RE: Crispin Rojas  [address] [facility] [id number]        Dear Colleague,    Thank you for referring your patient, Crispin Rojas, to the [facility]. Please see a copy of my visit note below.    Name: Crispin Rojas  Seen today for Diabetes (Last seen 3/29/2019).  HPI:  Crispin Rojas is a 59 year old male who presents for the management of:    1. Type 2 DM:  Originally diagnosed at the age of: 1997.  Insulin treated 15+ years.    Current Regimen: Metformin 1000 mg bid, Ozempic 1 mg weekly , Tresiba 55 units q am Humalog 1:9 + 1:10 > 100 correction (was previously advised to use a 2:50>150).    Started Bydureon 5/2018 - tolerated well.    Changed to Ozempic 6/2018 - feels Ozempic is working better  Using personal CGM - Corwin  Hospitalized 6/2019 for cellulitis and bilateral diabetic foot ulcers.  Healed now.    BS checks: 3-5 times per day, + Corwin  Meter Download: didn't bring meter  Corwin CGM download: Average glucose 110; 12% above 180, 67% in target range , 21% below target    Previously discussed insulin pump therapy , not interested in pump therapy at this time.    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  He tried taking a lower dose of Tresiba on work days but blood sugars outside of work hours were too high.   He stopped am Humalog on work days and this helped decrease low blood sugars.  Won't be working over the summer months so activity level is about to change significantly.    Taking gabapenin per neurology for ear/nerve problem.    Complications:   Diabetes Complications  Description / Detail    Diabetic Retinopathy   + diabetic retinopathy, last exam 6/2019, also sees  retinal specialist annually   CAD / PAD  No   Neuropathy   Yes, sees Dr. Molina    Nephropathy / Microalbuminuria  Yes, elevated microalbumin   Gastroparesis  No    Hypoglycemia Unawareness  No       2. Hypertension: Blood Pressure today:   BP Readings from Last 3 Encounters:   10/24/19 (!) 144/62   10/21/19 137/74   09/24/19 138/75   .  Blood pressure medications include Amlodipine 5 mg qd, lisinopril 40 mg qd.   3. Hyperlipidemia: Takes Atorvastatin 40 mg qd for lipid control.    4. Prevention:  Flu Shot- 10/2019  Pneumovax- 11/2015  Opthalmology-annaully  Dental-recommend semiannually  ASA- 325 mg every day.  Smoking-   PMH/PSH:  Past Medical History:   Diagnosis Date     Cerebral infarction (H)      Chronic infection      H/O heartburn      History of heartburn      Hypertension      Numbness and tingling      Obesity      GILA (obstructive sleep apnea) 10/22/2018     Renal stone      Type II or unspecified type diabetes mellitus without mention of complication, not stated as uncontrolled      Past Surgical History:   Procedure Laterality Date     AMPUTATE FOOT Left 8/18/2016    Procedure: AMPUTATE FOOT;  Surgeon: Jack Younger DPM;  Location: [facility]     AMPUTATE TOE(S) Right 2/1/2016    Procedure: AMPUTATE TOE(S);  Surgeon: Rachelle Manriquez DPM, Pod;  Location: [facility] OR     AMPUTATE TOE(S) Right 8/2/2019    Procedure: Right fourth toe partial amputation for treatment of osteomyelitis.;  Surgeon: Jack Younger DPM;  Location:  OR     ANGIOGRAM       COLONOSCOPY       COMBINED CYSTOSCOPY, RETROGRADES, URETEROSCOPY, INSERT STENT Left 8/21/2016    Procedure: COMBINED CYSTOSCOPY, RETROGRADES, URETEROSCOPY, INSERT STENT;  Surgeon: Artemio Valenzuela MD;  Location:  OR     COMBINED CYSTOSCOPY, RETROGRADES, URETEROSCOPY, LASER HOLMIUM LITHOTRIPSY URETER(S), INSERT STENT Left 10/3/2016    Procedure: COMBINED CYSTOSCOPY, RETROGRADES, URETEROSCOPY, LASER HOLMIUM LITHOTRIPSY URETER(S), INSERT STENT;  Surgeon: Artemoi Valenzuela MD;  Location:  OR     EXTRACORPOREAL SHOCK WAVE LITHOTRIPSY (ESWL) Left 9/8/2016    Procedure: EXTRACORPOREAL SHOCK WAVE LITHOTRIPSY  (ESWL);  Surgeon: Artemio Valenzuela MD;  Location: [facility] OR     RECESSION GASTROCNEMIUS Right 2/1/2016    Procedure: RECESSION GASTROCNEMIUS;  Surgeon: Rachelle Manriquez DPM, Pod;  Location: [facility] OR     Family Hx:  Family History   Problem Relation Age of Onset     Arthritis Mother         SLE     Connective Tissue Disorder Mother         lupus     Diabetes Mother      Cerebrovascular Disease Mother      Cancer Mother      Diabetes Father      Diabetes Maternal Grandfather      Diabetes Sister      Thyroid disease:          DM2: Yes: Strong family history on mothers side - mom, 7 maternal uncles, two maternal aunts, + MGF.         Autoimmune: DM1, SLE, RA, Vitiligo Yes: Lupus    Social Hx:  Social History     Socioeconomic History     Marital status:      Spouse name: Sydney     Number of children: 2     Years of education: Not on file     Highest education level: Not on file   Occupational History     Occupation: marketing     Employer: [employer]     Comment: [employer].   Social Needs     Financial resource strain: Not on file     Food insecurity:     Worry: Not on file     Inability: Not on file     Transportation needs:     Medical: Not on file     Non-medical: Not on file   Tobacco Use     Smoking status: Never Smoker     Smokeless tobacco: Never Used   Substance and Sexual Activity     Alcohol use: Yes     Alcohol/week: 0.0 standard drinks     Comment: rare---red wine 2x per month     Drug use: No     Sexual activity: Yes     Partners: Female   Lifestyle     Physical activity:     Days per week: Not on file     Minutes per session: Not on file     Stress: Not on file   Relationships     Social connections:     Talks on phone: Not on file     Gets together: Not on file     Attends [employer] service: Not on file     Active member of club or organization: Not on file     Attends meetings of clubs or organizations: Not on file     Relationship status: Not on file     Intimate partner violence:     Fear of  current or ex partner: Not on file     Emotionally abused: Not on file     Physically abused: Not on file     Forced sexual activity: Not on file   Other Topics Concern     Parent/sibling w/ CABG, MI or angioplasty before 65F 55M? No   Social History Narrative     Not on file          MEDICATIONS:  has a current medication list which includes the following prescription(s): insulin degludec, insulin lispro, metformin, semaglutide, amlodipine, aspirin, atorvastatin, blood glucose, blood glucose monitoring, blood glucose, cholecalciferol, cialis, co-enzyme q10, freestyle lilly 14 day sensor, gabapentin, pen needles, ketoconazole, ketoconazole, lisinopril, magnesium, multiple vitamins-minerals, and omega-3 fatty acids.    ROS     ROS: 10 point ROS neg other than the symptoms noted above in the HPI.    Physical Exam   VS: BP (!) 144/62 (BP Location: Right arm, Patient Position: Chair, Cuff Size: Adult Large)   Pulse 65   Resp 14   Wt 108.4 kg (239 lb)   BMI 32.87 kg/m     GENERAL: NAD, well dressed, answering questions appropriately, appears stated age.  HEENT: no exopthalmous, no proptosis, no lig lag, no retraction, no scleral icterus  RESPIRATORY: Clear bilaterally.  Normal respiratory effort.  CARDIOVASCULAR: RRR  NEUROLOGY: CN grossly intact, no tremors  MSK: grossly intact. Normal gait and station.  PSYCH: Intact judgment and insight. A&OX3 with a cordial affect.    LABS:  A1c:   Component      Latest Ref Rng & Units 6/24/2019 8/1/2019 10/21/2019   Hemoglobin A1C      0 - 5.6 % 7.7 (H) 6.8 (H) 5.9 (H)     Basic Metabolic Panel:  !COMPREHENSIVE Latest Ref Rng & Units 1/10/2019   SODIUM 133 - 144 mmol/L 143   POTASSIUM 3.4 - 5.3 mmol/L 4.2   CHLORIDE 94 - 109 mmol/L 110 (H)   CO2 mmol/L    BUN 7 - 30 mg/dL 12   Creatinine 0.66 - 1.25 mg/dL    Creatinine 0.66 - 1.25 mg/dL 0.89   Glucose 70 - 99 mg/dL 100 (H)   ANION GAP 3 - 14 mmol/L 10   CALCIUM 8.5 - 10.1 mg/dL 8.8   ALBUMIN 3.4 - 5.0 g/dL 4.0      LFTS/Cholesterol Panel:  !LIPID/HEPATIC Latest Ref Rng & Units 1/10/2019   CHOLESTEROL <200 mg/dL 96   TRIGLYCERIDES <150 mg/dL 89   HDL CHOLESTEROL >39 mg/dL 37 (L)   LDL CHOLESTEROL DIRECT 0 - 129 mg/dL    LDL CHOLESTEROL, CALCULATED <100 mg/dL 41   VLDL-CHOLESTEROL 0 - 30 mg/dL    NON HDL CHOLESTEROL <130 mg/dL 59   CHOLESTEROL/HDL RATIO 0.0 - 5.0    AST 0 - 45 U/L 67 (H)   ALT 0 - 70 U/L 82 (H)     Thyroid Function:   !THYROID Latest Ref Rng & Units 12/27/2017   TSH 0.40 - 4.00 mU/L 1.78     Urine MicroAlbumin:  Component      Latest Ref Rng & Units 1/10/2019   Creatinine Urine      mg/dL 240   Albumin Urine mg/L      mg/L 40   Albumin Urine mg/g Cr      0 - 17 mg/g Cr 16.83     Vitamin D:    All pertinent notes, labs, and images personally reviewed by me.     A/P  Mr.Patrick AKASH Rojas is a 59 year old here for the management of diabetes:    1. DM2 - A1c controlled, recent A1c in target range of < 7.0% but having too many low blood sugars.  Diabetes is complicated by diabetic retinopathy, nephropathy, and neuropathy.  Current Corwin download was while working regularly at very active job(s).  This is changing in 2 days and he will be off work for the winter.  Will plan to download Corwin in 2-3 weeks and reassess BG's without the regular increased activity.  Will adjust insulin dose(s) at that time if indicated.  I think a correction of 1:10 is too aggressive.  Recommend he change correction to 1:15.  Will adjust further depending on next Corwin download.  Continue Metformin 1000 mg bid,  Continue Tresiba 55 units daily   Continue current dose of Humalog and Ozempic 1 mg daily.  Continue Corwin  2. Hypertension - Uncontrolled.  BP elevated today.  See PCP for further evaluation/treatment.    3. Hyperlipidemia - On statin therapy    Labs ordered today:   No orders of the defined types were placed in this encounter.      Radiology/Consults ordered today: None    All questions were answered.  The patient indicates  understanding of the above issues and agrees with the plan set forth. More than 50% of the time spent with Mr. Rojas on counseling / coordinating his care - plan of care as discussed documented above. Total face to face time greater than or equal to 25 minutes.       Follow-up:  3 months    Jasmin Arteaga NP  Endocrinology  [facility]  CC:     Again, thank you for allowing me to participate in the care of your patient.        Sincerely,        SARAH Raymundo CNP

## 2019-10-24 NOTE — PROGRESS NOTES
Name: Crispin Rojas  Seen today for Diabetes (Last seen 3/29/2019).  HPI:  Crispin Rojas is a 59 year old male who presents for the management of:    1. Type 2 DM:  Originally diagnosed at the age of: 1997.  Insulin treated 15+ years.    Current Regimen: Metformin 1000 mg bid, Ozempic 1 mg weekly , Tresiba 55 units q am Humalog 1:9 + 1:10 > 100 correction (was previously advised to use a 2:50>150).    Started Bydureon 5/2018 - tolerated well.    Changed to Ozempic 6/2018 - feels Ozempic is working better  Using personal CGM - Corwin  Hospitalized 6/2019 for cellulitis and bilateral diabetic foot ulcers.  Healed now.    BS checks: 3-5 times per day, + Corwin  Meter Download: didn't bring meter  Corwin CGM download: Average glucose 110; 12% above 180, 67% in target range , 21% below target    Previously discussed insulin pump therapy , not interested in pump therapy at this time.    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  He tried taking a lower dose of Tresiba on work days but blood sugars outside of work hours were too high.   He stopped am Humalog on work days and this helped decrease low blood sugars.  Won't be working over the summer months so activity level is about to change significantly.    Taking gabapenin per neurology for ear/nerve problem.    Complications:   Diabetes Complications  Description / Detail    Diabetic Retinopathy   + diabetic retinopathy, last exam 6/2019, also sees  retinal specialist annually   CAD / PAD  No   Neuropathy   Yes, sees Dr. Molina    Nephropathy / Microalbuminuria  Yes, elevated microalbumin   Gastroparesis  No   Hypoglycemia Unawareness  No       2. Hypertension: Blood Pressure today:   BP Readings from Last 3 Encounters:   10/24/19 (!) 144/62   10/21/19 137/74   09/24/19 138/75   .  Blood pressure medications include Amlodipine 5 mg qd, lisinopril 40 mg qd.   3.  Hyperlipidemia: Takes Atorvastatin 40 mg qd for lipid control.    4. Prevention:  Flu Shot- 10/2019  Pneumovax- 11/2015  Opthalmology-annaully  Dental-recommend semiannually  ASA- 325 mg every day.  Smoking-   PMH/PSH:  Past Medical History:   Diagnosis Date     Cerebral infarction (H)      Chronic infection      H/O heartburn      History of heartburn      Hypertension      Numbness and tingling      Obesity      GILA (obstructive sleep apnea) 10/22/2018     Renal stone      Type II or unspecified type diabetes mellitus without mention of complication, not stated as uncontrolled      Past Surgical History:   Procedure Laterality Date     AMPUTATE FOOT Left 8/18/2016    Procedure: AMPUTATE FOOT;  Surgeon: Jack Younger DPM;  Location: [facility]     AMPUTATE TOE(S) Right 2/1/2016    Procedure: AMPUTATE TOE(S);  Surgeon: Rachelle Manriquez DPM, Pod;  Location: [facility] OR     AMPUTATE TOE(S) Right 8/2/2019    Procedure: Right fourth toe partial amputation for treatment of osteomyelitis.;  Surgeon: Jack Younger DPM;  Location: [facility] OR     ANGIOGRAM       COLONOSCOPY       COMBINED CYSTOSCOPY, RETROGRADES, URETEROSCOPY, INSERT STENT Left 8/21/2016    Procedure: COMBINED CYSTOSCOPY, RETROGRADES, URETEROSCOPY, INSERT STENT;  Surgeon: Artemio Valenzuela MD;  Location: [facility]     COMBINED CYSTOSCOPY, RETROGRADES, URETEROSCOPY, LASER HOLMIUM LITHOTRIPSY URETER(S), INSERT STENT Left 10/3/2016    Procedure: COMBINED CYSTOSCOPY, RETROGRADES, URETEROSCOPY, LASER HOLMIUM LITHOTRIPSY URETER(S), INSERT STENT;  Surgeon: Artemio Valenzuela MD;  Location: [facility]     EXTRACORPOREAL SHOCK WAVE LITHOTRIPSY (ESWL) Left 9/8/2016    Procedure: EXTRACORPOREAL SHOCK WAVE LITHOTRIPSY (ESWL);  Surgeon: Artemio Valenzuela MD;  Location: [facility] OR     RECESSION GASTROCNEMIUS Right 2/1/2016    Procedure: RECESSION GASTROCNEMIUS;  Surgeon: Rachelle Manriquez DPM, Pod;  Location: [facility] OR     Family Hx:  Family History   Problem Relation Age of  Onset     Arthritis Mother         SLE     Connective Tissue Disorder Mother         lupus     Diabetes Mother      Cerebrovascular Disease Mother      Cancer Mother      Diabetes Father      Diabetes Maternal Grandfather      Diabetes Sister      Thyroid disease:          DM2: Yes: Strong family history on mothers side - mom, 7 maternal uncles, two maternal aunts, + MGF.         Autoimmune: DM1, SLE, RA, Vitiligo Yes: Lupus    Social Hx:  Social History     Socioeconomic History     Marital status:      Spouse name: Sydney     Number of children: 2     Years of education: Not on file     Highest education level: Not on file   Occupational History     Occupation: marketing     Employer: [employer]     Comment: [employer]   Social Needs     Financial resource strain: Not on file     Food insecurity:     Worry: Not on file     Inability: Not on file     Transportation needs:     Medical: Not on file     Non-medical: Not on file   Tobacco Use     Smoking status: Never Smoker     Smokeless tobacco: Never Used   Substance and Sexual Activity     Alcohol use: Yes     Alcohol/week: 0.0 standard drinks     Comment: rare---red wine 2x per month     Drug use: No     Sexual activity: Yes     Partners: Female   Lifestyle     Physical activity:     Days per week: Not on file     Minutes per session: Not on file     Stress: Not on file   Relationships     Social connections:     Talks on phone: Not on file     Gets together: Not on file     Attends [employer] service: Not on file     Active member of club or organization: Not on file     Attends meetings of clubs or organizations: Not on file     Relationship status: Not on file     Intimate partner violence:     Fear of current or ex partner: Not on file     Emotionally abused: Not on file     Physically abused: Not on file     Forced sexual activity: Not on file   Other Topics Concern     Parent/sibling w/ CABG, MI or angioplasty before 65F 55M? No   Social History  Narrative     Not on file          MEDICATIONS:  has a current medication list which includes the following prescription(s): insulin degludec, insulin lispro, metformin, semaglutide, amlodipine, aspirin, atorvastatin, blood glucose, blood glucose monitoring, blood glucose, cholecalciferol, cialis, co-enzyme q10, freestyle lilly 14 day sensor, gabapentin, pen needles, ketoconazole, ketoconazole, lisinopril, magnesium, multiple vitamins-minerals, and omega-3 fatty acids.    ROS     ROS: 10 point ROS neg other than the symptoms noted above in the HPI.    Physical Exam   VS: BP (!) 144/62 (BP Location: Right arm, Patient Position: Chair, Cuff Size: Adult Large)   Pulse 65   Resp 14   Wt 108.4 kg (239 lb)   BMI 32.87 kg/m    GENERAL: NAD, well dressed, answering questions appropriately, appears stated age.  HEENT: no exopthalmous, no proptosis, no lig lag, no retraction, no scleral icterus  RESPIRATORY: Clear bilaterally.  Normal respiratory effort.  CARDIOVASCULAR: RRR  NEUROLOGY: CN grossly intact, no tremors  MSK: grossly intact. Normal gait and station.  PSYCH: Intact judgment and insight. A&OX3 with a cordial affect.    LABS:  A1c:   Component      Latest Ref Rng & Units 6/24/2019 8/1/2019 10/21/2019   Hemoglobin A1C      0 - 5.6 % 7.7 (H) 6.8 (H) 5.9 (H)     Basic Metabolic Panel:  !COMPREHENSIVE Latest Ref Rng & Units 1/10/2019   SODIUM 133 - 144 mmol/L 143   POTASSIUM 3.4 - 5.3 mmol/L 4.2   CHLORIDE 94 - 109 mmol/L 110 (H)   CO2 mmol/L    BUN 7 - 30 mg/dL 12   Creatinine 0.66 - 1.25 mg/dL    Creatinine 0.66 - 1.25 mg/dL 0.89   Glucose 70 - 99 mg/dL 100 (H)   ANION GAP 3 - 14 mmol/L 10   CALCIUM 8.5 - 10.1 mg/dL 8.8   ALBUMIN 3.4 - 5.0 g/dL 4.0     LFTS/Cholesterol Panel:  !LIPID/HEPATIC Latest Ref Rng & Units 1/10/2019   CHOLESTEROL <200 mg/dL 96   TRIGLYCERIDES <150 mg/dL 89   HDL CHOLESTEROL >39 mg/dL 37 (L)   LDL CHOLESTEROL DIRECT 0 - 129 mg/dL    LDL CHOLESTEROL, CALCULATED <100 mg/dL 41    VLDL-CHOLESTEROL 0 - 30 mg/dL    NON HDL CHOLESTEROL <130 mg/dL 59   CHOLESTEROL/HDL RATIO 0.0 - 5.0    AST 0 - 45 U/L 67 (H)   ALT 0 - 70 U/L 82 (H)     Thyroid Function:   !THYROID Latest Ref Rng & Units 12/27/2017   TSH 0.40 - 4.00 mU/L 1.78     Urine MicroAlbumin:  Component      Latest Ref Rng & Units 1/10/2019   Creatinine Urine      mg/dL 240   Albumin Urine mg/L      mg/L 40   Albumin Urine mg/g Cr      0 - 17 mg/g Cr 16.83     Vitamin D:    All pertinent notes, labs, and images personally reviewed by me.     A/P  Mr.Patrick AKASH Rojas is a 59 year old here for the management of diabetes:    1. DM2 - A1c controlled, recent A1c in target range of < 7.0% but having too many low blood sugars.  Diabetes is complicated by diabetic retinopathy, nephropathy, and neuropathy.  Current Corwin download was while working regularly at very active job(s).  This is changing in 2 days and he will be off work for the winter.  Will plan to download Corwin in 2-3 weeks and reassess BG's without the regular increased activity.  Will adjust insulin dose(s) at that time if indicated.  I think a correction of 1:10 is too aggressive.  Recommend he change correction to 1:15.  Will adjust further depending on next Corwin download.  Continue Metformin 1000 mg bid,  Continue Tresiba 55 units daily   Continue current dose of Humalog and Ozempic 1 mg daily.  Continue Corwin  2. Hypertension - Uncontrolled.  BP elevated today.  See PCP for further evaluation/treatment.    3. Hyperlipidemia - On statin therapy    Labs ordered today:   No orders of the defined types were placed in this encounter.      Radiology/Consults ordered today: None    All questions were answered.  The patient indicates understanding of the above issues and agrees with the plan set forth. More than 50% of the time spent with Mr. Rojas on counseling / coordinating his care - plan of care as discussed documented above. Total face to face time greater than or equal to 25  minutes.       Follow-up:  3 months    Jasmin Arteaga NP  Endocrinology  [facility]  CC:

## 2019-10-24 NOTE — PATIENT INSTRUCTIONS
Because your work activity level is going to change so drastically, I would like to wait and reassess your blood sugar patterns before making any insulin dose changes.      Accept the invitation we sent then call Kristie in about two-three weeks and ask her to download your Corwin and put it on my desk for review.    A1c:   Component      Latest Ref Rng & Units 6/24/2019 8/1/2019 10/21/2019   Hemoglobin A1C      0 - 5.6 % 7.7 (H) 6.8 (H) 5.9 (H)     Your A1c is good but I think you are having too many low blood sugars.          Try using a 1 unit per 15 over 100 as a correction.  I think your current correction is too aggressive.    Follow up as scheduled in January.    Jasmin Arteaga NP  Endocrinology

## 2019-10-31 ENCOUNTER — NURSE TRIAGE (OUTPATIENT)
Dept: NURSING | Facility: CLINIC | Age: 59
End: 2019-10-31

## 2019-10-31 ENCOUNTER — APPOINTMENT (OUTPATIENT)
Dept: ULTRASOUND IMAGING | Facility: CLINIC | Age: 59
End: 2019-10-31
Attending: EMERGENCY MEDICINE
Payer: COMMERCIAL

## 2019-10-31 ENCOUNTER — HOSPITAL ENCOUNTER (EMERGENCY)
Facility: CLINIC | Age: 59
Discharge: HOME | End: 2019-10-31
Attending: EMERGENCY MEDICINE | Admitting: EMERGENCY MEDICINE
Payer: COMMERCIAL

## 2019-10-31 ENCOUNTER — APPOINTMENT (OUTPATIENT)
Dept: MRI IMAGING | Facility: CLINIC | Age: 59
End: 2019-10-31
Attending: EMERGENCY MEDICINE
Payer: COMMERCIAL

## 2019-10-31 VITALS
RESPIRATION RATE: 18 BRPM | BODY MASS INDEX: 32.73 KG/M2 | OXYGEN SATURATION: 97 % | TEMPERATURE: 98.7 F | DIASTOLIC BLOOD PRESSURE: 68 MMHG | WEIGHT: 238 LBS | SYSTOLIC BLOOD PRESSURE: 120 MMHG | HEART RATE: 66 BPM

## 2019-10-31 DIAGNOSIS — L03.032 CELLULITIS OF LEFT TOE: ICD-10-CM

## 2019-10-31 LAB
ANION GAP SERPL CALCULATED.3IONS-SCNC: 6 MMOL/L (ref 3–14)
BASOPHILS # BLD AUTO: 0.1 10E9/L (ref 0–0.2)
BASOPHILS NFR BLD AUTO: 0.6 %
BUN SERPL-MCNC: 22 MG/DL (ref 7–30)
CALCIUM SERPL-MCNC: 9 MG/DL (ref 8.5–10.1)
CHLORIDE SERPL-SCNC: 106 MMOL/L (ref 94–109)
CO2 SERPL-SCNC: 26 MMOL/L (ref 20–32)
CREAT SERPL-MCNC: 0.92 MG/DL (ref 0.66–1.25)
CRP SERPL-MCNC: 17.5 MG/L (ref 0–8)
DIFFERENTIAL METHOD BLD: ABNORMAL
EOSINOPHIL # BLD AUTO: 0.3 10E9/L (ref 0–0.7)
EOSINOPHIL NFR BLD AUTO: 3.2 %
ERYTHROCYTE [DISTWIDTH] IN BLOOD BY AUTOMATED COUNT: 14.1 % (ref 10–15)
ERYTHROCYTE [SEDIMENTATION RATE] IN BLOOD BY WESTERGREN METHOD: 30 MM/H (ref 0–20)
GFR SERPL CREATININE-BSD FRML MDRD: >90 ML/MIN/{1.73_M2}
GLUCOSE BLDC GLUCOMTR-MCNC: 126 MG/DL (ref 70–99)
GLUCOSE BLDC GLUCOMTR-MCNC: 129 MG/DL (ref 70–99)
GLUCOSE BLDC GLUCOMTR-MCNC: 182 MG/DL (ref 70–99)
GLUCOSE BLDC GLUCOMTR-MCNC: 36 MG/DL (ref 70–99)
GLUCOSE SERPL-MCNC: 48 MG/DL (ref 70–99)
HCT VFR BLD AUTO: 38.6 % (ref 40–53)
HGB BLD-MCNC: 12.2 G/DL (ref 13.3–17.7)
IMM GRANULOCYTES # BLD: 0 10E9/L (ref 0–0.4)
IMM GRANULOCYTES NFR BLD: 0.3 %
LYMPHOCYTES # BLD AUTO: 3.4 10E9/L (ref 0.8–5.3)
LYMPHOCYTES NFR BLD AUTO: 39 %
MCH RBC QN AUTO: 28.4 PG (ref 26.5–33)
MCHC RBC AUTO-ENTMCNC: 31.6 G/DL (ref 31.5–36.5)
MCV RBC AUTO: 90 FL (ref 78–100)
MONOCYTES # BLD AUTO: 0.7 10E9/L (ref 0–1.3)
MONOCYTES NFR BLD AUTO: 8.1 %
NEUTROPHILS # BLD AUTO: 4.3 10E9/L (ref 1.6–8.3)
NEUTROPHILS NFR BLD AUTO: 48.8 %
NRBC # BLD AUTO: 0 10*3/UL
NRBC BLD AUTO-RTO: 0 /100
PLATELET # BLD AUTO: 218 10E9/L (ref 150–450)
POTASSIUM SERPL-SCNC: 3.5 MMOL/L (ref 3.4–5.3)
RBC # BLD AUTO: 4.29 10E12/L (ref 4.4–5.9)
SODIUM SERPL-SCNC: 138 MMOL/L (ref 133–144)
WBC # BLD AUTO: 8.7 10E9/L (ref 4–11)

## 2019-10-31 PROCEDURE — 80048 BASIC METABOLIC PNL TOTAL CA: CPT | Performed by: EMERGENCY MEDICINE

## 2019-10-31 PROCEDURE — 85652 RBC SED RATE AUTOMATED: CPT | Performed by: EMERGENCY MEDICINE

## 2019-10-31 PROCEDURE — 00000146 ZZHCL STATISTIC GLUCOSE BY METER IP

## 2019-10-31 PROCEDURE — 87040 BLOOD CULTURE FOR BACTERIA: CPT | Performed by: EMERGENCY MEDICINE

## 2019-10-31 PROCEDURE — 99285 EMERGENCY DEPT VISIT HI MDM: CPT | Mod: 25

## 2019-10-31 PROCEDURE — 73720 MRI LWR EXTREMITY W/O&W/DYE: CPT | Mod: LT

## 2019-10-31 PROCEDURE — 86140 C-REACTIVE PROTEIN: CPT | Performed by: EMERGENCY MEDICINE

## 2019-10-31 PROCEDURE — 25500064 ZZH RX 255 OP 636: Performed by: EMERGENCY MEDICINE

## 2019-10-31 PROCEDURE — A9585 GADOBUTROL INJECTION: HCPCS | Performed by: EMERGENCY MEDICINE

## 2019-10-31 PROCEDURE — 25800025 ZZH RX 258: Performed by: EMERGENCY MEDICINE

## 2019-10-31 PROCEDURE — 93971 EXTREMITY STUDY: CPT | Mod: LT

## 2019-10-31 PROCEDURE — 85025 COMPLETE CBC W/AUTO DIFF WBC: CPT | Performed by: EMERGENCY MEDICINE

## 2019-10-31 PROCEDURE — 96365 THER/PROPH/DIAG IV INF INIT: CPT

## 2019-10-31 PROCEDURE — 25000125 ZZHC RX 250: Performed by: EMERGENCY MEDICINE

## 2019-10-31 PROCEDURE — 25000128 H RX IP 250 OP 636: Performed by: EMERGENCY MEDICINE

## 2019-10-31 PROCEDURE — 36415 COLL VENOUS BLD VENIPUNCTURE: CPT | Performed by: EMERGENCY MEDICINE

## 2019-10-31 PROCEDURE — 96367 TX/PROPH/DG ADDL SEQ IV INF: CPT

## 2019-10-31 RX ORDER — GADOBUTROL 604.72 MG/ML
10 INJECTION INTRAVENOUS ONCE
Status: COMPLETED | OUTPATIENT
Start: 2019-10-31 | End: 2019-10-31

## 2019-10-31 RX ORDER — CLINDAMYCIN HCL 300 MG
300 CAPSULE ORAL 4 TIMES DAILY
Qty: 30 CAPSULE | Refills: 0 | Status: ON HOLD | OUTPATIENT
Start: 2019-10-31 | End: 2019-11-07

## 2019-10-31 RX ORDER — CLINDAMYCIN PHOSPHATE 900 MG/50ML
900 INJECTION, SOLUTION INTRAVENOUS ONCE
Status: COMPLETED | OUTPATIENT
Start: 2019-10-31 | End: 2019-10-31

## 2019-10-31 RX ORDER — CIPROFLOXACIN 2 MG/ML
400 INJECTION, SOLUTION INTRAVENOUS ONCE
Status: COMPLETED | OUTPATIENT
Start: 2019-10-31 | End: 2019-10-31

## 2019-10-31 RX ORDER — CIPROFLOXACIN 500 MG/1
500 TABLET, FILM COATED ORAL 2 TIMES DAILY
Qty: 20 TABLET | Refills: 0 | Status: ON HOLD | OUTPATIENT
Start: 2019-10-31 | End: 2019-11-10

## 2019-10-31 RX ORDER — DEXTROSE MONOHYDRATE 25 G/50ML
25 INJECTION, SOLUTION INTRAVENOUS ONCE
Status: COMPLETED | OUTPATIENT
Start: 2019-10-31 | End: 2019-10-31

## 2019-10-31 RX ORDER — CLINDAMYCIN HCL 300 MG
300 CAPSULE ORAL 4 TIMES DAILY
Qty: 30 CAPSULE | Refills: 0 | Status: SHIPPED | OUTPATIENT
Start: 2019-10-31 | End: 2019-10-31

## 2019-10-31 RX ORDER — CIPROFLOXACIN 500 MG/1
500 TABLET, FILM COATED ORAL 2 TIMES DAILY
Qty: 20 TABLET | Refills: 0 | Status: SHIPPED | OUTPATIENT
Start: 2019-10-31 | End: 2019-10-31

## 2019-10-31 RX ADMIN — CIPROFLOXACIN 400 MG: 2 INJECTION, SOLUTION INTRAVENOUS at 04:00

## 2019-10-31 RX ADMIN — GADOBUTROL 10 ML: 604.72 INJECTION INTRAVENOUS at 03:19

## 2019-10-31 RX ADMIN — DEXTROSE MONOHYDRATE 25 ML: 25 INJECTION, SOLUTION INTRAVENOUS at 02:25

## 2019-10-31 RX ADMIN — CLINDAMYCIN PHOSPHATE 900 MG: 900 INJECTION, SOLUTION INTRAVENOUS at 02:31

## 2019-10-31 ASSESSMENT — ENCOUNTER SYMPTOMS
NUMBNESS: 0
FEVER: 0
COLOR CHANGE: 1
NAUSEA: 0
CONSTIPATION: 0
ABDOMINAL PAIN: 0
JOINT SWELLING: 1
DYSURIA: 0
DIARRHEA: 0

## 2019-10-31 NOTE — ED TRIAGE NOTES
Pt here for left foot and ankle swelling since tonight.  Denies pain.  Hx of left great toe amputation

## 2019-10-31 NOTE — DISCHARGE INSTRUCTIONS
Please return to the ED as needed for new or worsening symptoms such as fever greater than 100.4 F, vomiting and unable to keep anything down, worsening redness and swelling to foot and leg, any other concerning symptoms.      Discharge Instructions  Cellulitis    Cellulitis is an infection of the skin that occurs when bacteria enter the skin.   Symptoms are generally redness, swelling, warmth and pain.  Your infection appeared to be appropriate to treat at home with antibiotics.  However, sometimes your infection may be worse than it seemed at first, or may worsen with time. If you have new or worse symptoms, you may need to be seen again in the Emergency Department or by your primary provider.    Generally, every Emergency Department visit should have a follow-up clinic visit with either a primary or a specialty clinic/provider. Please follow-up as instructed by your emergency provider today.    Return to the Emergency Department if:  The redness, pain, or swelling gets a lot worse.  If the red area was marked, return if it is red significantly beyond the marked area.  You are unable to get your antibiotics, or are vomiting (throwing up) these pills, or you cannot take them.  You are feeling more ill, weak or lightheaded.  You start to run a new fever (temperature >101 F).  Anything else about the infection worries or concerns you.  Treatment:  Start your antibiotics right away, and take them as prescribed. Be sure to finish the whole prescription, even if you are better.  Apply a heating pad, warm packs, or warm water soaks to the infected area for 15 minutes at a time, at least 3 times a day. Do not use a heating pad on your feet or legs if you have diabetes. Do not sleep with a heating pad on, since this can cause burns or skin injury.  Rest your injured area for at least 1-2 days. After that you may start using your extremity again as long as there is not too much pain.   Raise the injured area above the level  of your heart as much as possible in the first 1-2 days.  Tylenol  (acetaminophen), Motrin  (ibuprofen), or Advil  (ibuprofen) may help may help reduce pain and fever and may help you feel more comfortable. Be sure to read and follow the package directions, and ask your provider if you have questions.    If you were given a prescription for medicine here today, be sure to read all of the information (including the package insert) that comes with your prescription.  This will include important information about the medicine, its side effects, and any warnings that you need to know about.  The pharmacist who fills the prescription can provide more information and answer questions you may have about the medicine.  If you have questions or concerns that the pharmacist cannot address, please call or return to the Emergency Department.     Remember that you can always come back to the Emergency Department if you are not able to see your regular provider in the amount of time listed above, if you get any new symptoms, or if there is anything that worries you.

## 2019-10-31 NOTE — ED AVS SNAPSHOT
[facility] Emergency Department  [address]  [facility] [address]  Phone:  [phone]  Fax:  [phone]                                    Crispin Rojas   MRN: [id number]    Department:  [facility] Emergency Department   Date of Visit:  10/31/2019           After Visit Summary Signature Page    I have received my discharge instructions, and my questions have been answered. I have discussed any challenges I see with this plan with the nurse or doctor.    ..........................................................................................................................................  Patient/Patient Representative Signature      ..........................................................................................................................................  Patient Representative Print Name and Relationship to Patient    ..................................................               ................................................  Date                                   Time    ..........................................................................................................................................  Reviewed by Signature/Title    ...................................................              ..............................................  Date                                               Time          22EPIC Rev 08/18

## 2019-10-31 NOTE — TELEPHONE ENCOUNTER
Caller states he has a wound on his left foot area. Caller states the wound is swollen now. Caller denies any fever or symptoms of shock. Caller states he has a flight to catch out at 7am. Triage guidelines recommend to go to ED. Caller verbalized and understands directives.    Reason for Disposition    [1] Skin around the wound has become red AND [2] larger than 1 inch (2.5 cm)    Additional Information    Negative: [1] Widespread bright red rash AND [2] fainted or too weak to stand    Negative: Sounds like a life-threatening emergency to the triager    Negative: [1] Cellulitis diagnosed AND [2] taking an antibiotic    Negative: [1] Animal or human bite that is infected AND [2] taking an antibiotic    Negative: [1] Wound infection AND [2] taking an antibiotic    Negative: [1] Boil (skin abscess) AND [2] taking an antibiotic and/or incised and drained    Negative: Major surgical wound    Negative: Stitches and not infected    Negative: Boil suspected (red lump in skin)    Negative: [1] MRSA questions or exposure AND [2] no symptoms    Negative: [1] Widespread rash AND [2] bright red, sunburn-like    Negative: Severe pain in the wound    Negative: Black (necrotic) tissue or blisters develop in wound    Negative: Child sounds very sick or weak to the triager    Negative: [1] Fever AND [2] signs of wound infection    Negative: [1] Red streak runs from the wound AND [2] larger than 1 inch (2.5 cm)    Protocols used: WOUND INFECTION [id number]

## 2019-10-31 NOTE — ED PROVIDER NOTES
History     Chief Complaint:  Foot Swelling    HPI   Crispin Rojas is a 59 year old type II diabetic male, with history of heartburn, chronic infection, thoracic aortic aneurysm, hypertension, hyperlipidemia, liver cirrhosis, and kidney stones amongst others as noted below presents with left foot swelling. The patient reports that he was getting ready for bed this evening and noticed that his left ankle was swollen. He notes that he has had an open wound on his second toe of the left foot for the past month and follows with podiatry for that. However, here the patient noticed new redness around the area and is concerned for infection. He describes that there is normally drainage from the wound and denies any increased drainage. The patient denies any fevers, numbness, tingling, nausea, chest pain, abdominal pain, changes in urination or bowel movements, or recent antibiotic usage.     Allergies:  Niacin  Simvastatin      Medications:    Amlodipine  Aspirin   Lipitor  Cialis  Neurontin  Tresiba  Humalog  Lisinopril  Metformin  Ozempic    Past Medical History:    Cerebral infarction  Chronic infection  Heartburn/GERD  Liver cirrhosis  Hypertension  Numbness and tingling  Obesity  GILA  Osteomyelitis  Kidney stones  Type II diabetes  Right bundle branch block  Thoracic aortic aneurysm without rupture     Past Surgical History:    Amputate foot - left  Amputate toe(s)  x2  Angiogram  Colonoscopy  Combined cystoscopy, retrograde, ureteroscopy, insert stent  Combined cystoscopy, retrogrades, ureteroscopy, laser holmium lithotripsy ureter(s), insert stent  Extracorporeal shock wave lithotripsy  Recession gastrocnemius    Family History:    Arthritis  Lupus  Diabetes  CVD  Cancer    Social History:  The patient was accompanied to the ED by himself.  Smoking Status: Negative  Smokeless Tobacco: Negative  Alcohol Use: Positive, rare  Drug Use: Negative  Marital Status:   [2]     Review of Systems   Constitutional:  Negative for fever.   Cardiovascular: Negative for chest pain.   Gastrointestinal: Negative for abdominal pain, constipation, diarrhea and nausea.   Genitourinary: Negative for dysuria.   Musculoskeletal: Positive for joint swelling (left ankle).   Skin: Positive for color change (second toe on left foot).   Neurological: Negative for numbness.   All other systems reviewed and are negative.        Physical Exam     Patient Vitals for the past 24 hrs:   BP Temp Temp src Pulse Heart Rate Resp SpO2 Weight   10/31/19 0430 -- -- -- -- -- -- 95 % --   10/31/19 0425 120/68 -- -- 65 -- -- 97 % --   10/31/19 0300 108/59 -- -- 65 -- -- -- --   10/31/19 0245 109/62 -- -- 69 -- -- -- --   10/31/19 0236 94/52 -- -- 83 -- -- -- --   10/31/19 0146 135/76 98.7  F (37.1  C) Temporal -- 71 18 97 % 108 kg (238 lb)     Physical Exam  Constitutional: Well developed, nontox appearance  Head: Atraumatic.   Mouth/Throat: Oropharynx is clear and moist.   Neck:  no stridor  Eyes: no scleral icterus  Cardiovascular: RRR, 2+ L DP and PT pulses  Pulmonary/Chest: nml resp effort, Clear BS bilat  Abdominal: ND, +BS, soft, NT, no rebound or guarding   : no CVA tenderness bilat  Ext: Warm, well perfused, no edema other than that noted below              LLE:  Previous great toe amputation, ankle edema, no erythema, full range of motion, distal second toe ulcer with associated erythema and minimal drainage, no significant tenderness  Neurological: A&O, symmetric facies, moves ext x4  Skin: Skin is warm and dry. findings as noted above in extremity exam  Psychiatric: Behavior is normal. Thought content normal.   Nursing note and vitals reviewed.    Emergency Department Course   Imaging:  Radiographic findings were communicated with the patient who voiced understanding of the findings.  US Lower Extremity Venous Duplex, left:  No deep venous thrombosis in the left lower extremity as per radiology.     MR Foot 3 w/o & with contrast left:   Posterior  dislocation/subluxation of the proximal phalanx from the second metatarsophalangeal joint. Small joint effusion. Mild edema at the base of the proximal phalanx, favored to represent contusion rather than osteomyelitis. Hammertoe deformities.   Status post amputation of the first toe as per radiology.     Laboratory:  CBC: WBC: 8.7, HGB: 12.2 (L), PLT: 218  BMP: Glucose 48 (L), o/w WNL (Creatinine: 0.92)    CRP inflammation: 17.5 (H)  Erythrocyte sedimentation rate auto: 30 (H)    0216 Glucose by meter: 36 (L)  0251 Glucose by meter: 129 (H)  0305 Glucose by meter: 126 (H)    Blood culture x2: pending    Interventions:  0225 dextrose 50% 25 mL IV  0231 clindamycin 900 mg IV    Emergency Department Course:  Nursing notes and vitals reviewed. (0152) I performed an exam of the patient as documented above.      IV inserted. Medicine administered as documented above. Blood drawn. This was sent to the lab for further testing, results above.     The patient was sent for a MR Foot and US Lower Extremity while in the emergency department, findings above.      (2792) I rechecked the patient and discussed the results of his workup thus far.      Findings and plan explained to the Patient. Patient discharged home with instructions regarding supportive care, medications, and reasons to return. The importance of close follow-up was reviewed. The patient was prescribed Cipro and clindamycin.     I personally reviewed the laboratory and imaging results with the Patient and answered all related questions prior to discharge.       Impression & Plan    Medical Decision Makin year old male presenting w/ Left ankle swelling     DDx includes cellulitis, osteomyelitis, dependent edema, DVT.  Doubt acute arterial insufficiency. Labs significant for mildly elevated CRP and ESR although less so than previous, nml WBC, Cr wnl, mild anemia at baseline.  Imaging sig for for neg duplex US, MRI w/o evidence of osteomyelitis on preliminary  read.  Pt's presentation seems consistent with early cellulitis.  Likely safe for trial of outpt management.  First dose abx given in ED.  At this time I feel the pt is safe for discharge.  Recommendations given regarding follow up with PCP, primary podiatrist and return to the emergency department as needed for new or worsening symptoms.  Pt counseled on all results, disposition and diagnosis.  They are understanding and agreeable to plan. Patient discharged in stable condition.         Diagnosis:    ICD-10-CM    1. Cellulitis of left toe L03.032      Disposition:  discharged to home    Discharge Medications:  Current Discharge Medication List      START taking these medications    Details   ciprofloxacin (CIPRO) 500 MG tablet Take 1 tablet (500 mg) by mouth 2 times daily  Qty: 20 tablet, Refills: 0      clindamycin (CLEOCIN) 300 MG capsule Take 1 capsule (300 mg) by mouth 4 times daily  Qty: 30 capsule, Refills: 0           Scribe Disclosure:  IMarilia, am serving as a scribe on 10/31/2019 at 2:07 AM to personally document services performed by Erwin Bach MD based on my observations and the provider's statements to me.     Marilia Gomez  10/31/2019   [facility] EMERGENCY DEPARTMENT       Erwin Bach MD  10/31/19 8723

## 2019-11-07 ENCOUNTER — HOSPITAL ENCOUNTER (INPATIENT)
Facility: CLINIC | Age: 59
LOS: 3 days | Discharge: HOME | End: 2019-11-10
Attending: INTERNAL MEDICINE | Admitting: INTERNAL MEDICINE
Payer: COMMERCIAL

## 2019-11-07 ENCOUNTER — HOSPITAL ENCOUNTER (INPATIENT)
Facility: CLINIC | Age: 59
Setting detail: SURGERY ADMIT
End: 2019-11-07
Attending: PODIATRIST | Admitting: PODIATRIST
Payer: COMMERCIAL

## 2019-11-07 ENCOUNTER — OFFICE VISIT (OUTPATIENT)
Dept: PODIATRY | Facility: CLINIC | Age: 59
End: 2019-11-07
Payer: COMMERCIAL

## 2019-11-07 VITALS
WEIGHT: 248 LBS | DIASTOLIC BLOOD PRESSURE: 70 MMHG | BODY MASS INDEX: 33.59 KG/M2 | SYSTOLIC BLOOD PRESSURE: 129 MMHG | HEART RATE: 66 BPM | HEIGHT: 72 IN

## 2019-11-07 DIAGNOSIS — E11.42 TYPE 2 DIABETES MELLITUS WITH PERIPHERAL NEUROPATHY (H): ICD-10-CM

## 2019-11-07 DIAGNOSIS — L97.909 DIABETIC ULCER OF LOWER EXTREMITY (H): Primary | ICD-10-CM

## 2019-11-07 DIAGNOSIS — E11.622 DIABETIC ULCER OF LOWER EXTREMITY (H): Primary | ICD-10-CM

## 2019-11-07 DIAGNOSIS — M86.9 OSTEOMYELITIS OF TOE (H): Primary | ICD-10-CM

## 2019-11-07 LAB
ALBUMIN SERPL-MCNC: 3.6 G/DL (ref 3.4–5)
ALP SERPL-CCNC: 48 U/L (ref 40–150)
ALT SERPL W P-5'-P-CCNC: 42 U/L (ref 0–70)
ANION GAP SERPL CALCULATED.3IONS-SCNC: 5 MMOL/L (ref 3–14)
AST SERPL W P-5'-P-CCNC: 27 U/L (ref 0–45)
BASOPHILS # BLD AUTO: 0 10E9/L (ref 0–0.2)
BASOPHILS NFR BLD AUTO: 0.7 %
BILIRUB SERPL-MCNC: 0.4 MG/DL (ref 0.2–1.3)
BUN SERPL-MCNC: 25 MG/DL (ref 7–30)
CALCIUM SERPL-MCNC: 8.9 MG/DL (ref 8.5–10.1)
CHLORIDE SERPL-SCNC: 108 MMOL/L (ref 94–109)
CO2 SERPL-SCNC: 25 MMOL/L (ref 20–32)
CREAT SERPL-MCNC: 0.89 MG/DL (ref 0.66–1.25)
CRP SERPL-MCNC: <2.9 MG/L (ref 0–8)
DIFFERENTIAL METHOD BLD: ABNORMAL
EOSINOPHIL # BLD AUTO: 0.2 10E9/L (ref 0–0.7)
EOSINOPHIL NFR BLD AUTO: 4.3 %
ERYTHROCYTE [DISTWIDTH] IN BLOOD BY AUTOMATED COUNT: 14 % (ref 10–15)
ERYTHROCYTE [SEDIMENTATION RATE] IN BLOOD BY WESTERGREN METHOD: 28 MM/H (ref 0–20)
GFR SERPL CREATININE-BSD FRML MDRD: >90 ML/MIN/{1.73_M2}
GLUCOSE BLDC GLUCOMTR-MCNC: 124 MG/DL (ref 70–99)
GLUCOSE BLDC GLUCOMTR-MCNC: 165 MG/DL (ref 70–99)
GLUCOSE SERPL-MCNC: 110 MG/DL (ref 70–99)
HCT VFR BLD AUTO: 36.4 % (ref 40–53)
HGB BLD-MCNC: 11.5 G/DL (ref 13.3–17.7)
IMM GRANULOCYTES # BLD: 0 10E9/L (ref 0–0.4)
IMM GRANULOCYTES NFR BLD: 0.2 %
INR PPP: 1.08 (ref 0.86–1.14)
LYMPHOCYTES # BLD AUTO: 2 10E9/L (ref 0.8–5.3)
LYMPHOCYTES NFR BLD AUTO: 36.4 %
MCH RBC QN AUTO: 28.5 PG (ref 26.5–33)
MCHC RBC AUTO-ENTMCNC: 31.6 G/DL (ref 31.5–36.5)
MCV RBC AUTO: 90 FL (ref 78–100)
MONOCYTES # BLD AUTO: 0.4 10E9/L (ref 0–1.3)
MONOCYTES NFR BLD AUTO: 6.7 %
NEUTROPHILS # BLD AUTO: 2.8 10E9/L (ref 1.6–8.3)
NEUTROPHILS NFR BLD AUTO: 51.7 %
NRBC # BLD AUTO: 0 10*3/UL
NRBC BLD AUTO-RTO: 0 /100
PLATELET # BLD AUTO: 168 10E9/L (ref 150–450)
POTASSIUM SERPL-SCNC: 4.3 MMOL/L (ref 3.4–5.3)
PROT SERPL-MCNC: 7.5 G/DL (ref 6.8–8.8)
RBC # BLD AUTO: 4.03 10E12/L (ref 4.4–5.9)
SODIUM SERPL-SCNC: 138 MMOL/L (ref 133–144)
WBC # BLD AUTO: 5.4 10E9/L (ref 4–11)

## 2019-11-07 PROCEDURE — 85652 RBC SED RATE AUTOMATED: CPT | Performed by: INTERNAL MEDICINE

## 2019-11-07 PROCEDURE — 25800030 ZZH RX IP 258 OP 636: Performed by: INTERNAL MEDICINE

## 2019-11-07 PROCEDURE — 12000000 ZZH R&B MED SURG/OB

## 2019-11-07 PROCEDURE — 36415 COLL VENOUS BLD VENIPUNCTURE: CPT | Performed by: INTERNAL MEDICINE

## 2019-11-07 PROCEDURE — 00000146 ZZHCL STATISTIC GLUCOSE BY METER IP

## 2019-11-07 PROCEDURE — 87040 BLOOD CULTURE FOR BACTERIA: CPT | Performed by: INTERNAL MEDICINE

## 2019-11-07 PROCEDURE — 85025 COMPLETE CBC W/AUTO DIFF WBC: CPT | Performed by: INTERNAL MEDICINE

## 2019-11-07 PROCEDURE — 85610 PROTHROMBIN TIME: CPT | Performed by: INTERNAL MEDICINE

## 2019-11-07 PROCEDURE — 25000128 H RX IP 250 OP 636: Performed by: INTERNAL MEDICINE

## 2019-11-07 PROCEDURE — 99222 1ST HOSP IP/OBS MODERATE 55: CPT | Mod: AI | Performed by: INTERNAL MEDICINE

## 2019-11-07 PROCEDURE — 80053 COMPREHEN METABOLIC PANEL: CPT | Performed by: INTERNAL MEDICINE

## 2019-11-07 PROCEDURE — 99214 OFFICE O/P EST MOD 30 MIN: CPT | Mod: 25 | Performed by: PODIATRIST

## 2019-11-07 PROCEDURE — 25000132 ZZH RX MED GY IP 250 OP 250 PS 637: Performed by: INTERNAL MEDICINE

## 2019-11-07 PROCEDURE — 11042 DBRDMT SUBQ TIS 1ST 20SQCM/<: CPT | Performed by: PODIATRIST

## 2019-11-07 PROCEDURE — 86140 C-REACTIVE PROTEIN: CPT | Performed by: INTERNAL MEDICINE

## 2019-11-07 RX ORDER — ATORVASTATIN CALCIUM 40 MG/1
40 TABLET, FILM COATED ORAL DAILY
Status: DISCONTINUED | OUTPATIENT
Start: 2019-11-08 | End: 2019-11-10 | Stop reason: HOSPADM

## 2019-11-07 RX ORDER — ACETAMINOPHEN 325 MG/1
650 TABLET ORAL EVERY 4 HOURS PRN
Status: DISCONTINUED | OUTPATIENT
Start: 2019-11-07 | End: 2019-11-10 | Stop reason: HOSPADM

## 2019-11-07 RX ORDER — GABAPENTIN 300 MG/1
300 CAPSULE ORAL 3 TIMES DAILY
Status: DISCONTINUED | OUTPATIENT
Start: 2019-11-07 | End: 2019-11-10 | Stop reason: HOSPADM

## 2019-11-07 RX ORDER — OXYCODONE HYDROCHLORIDE 5 MG/1
5 TABLET ORAL EVERY 4 HOURS PRN
Status: DISCONTINUED | OUTPATIENT
Start: 2019-11-07 | End: 2019-11-10 | Stop reason: HOSPADM

## 2019-11-07 RX ORDER — UBIDECARENONE 100 MG
100 CAPSULE ORAL DAILY
Status: DISCONTINUED | OUTPATIENT
Start: 2019-11-08 | End: 2019-11-10 | Stop reason: HOSPADM

## 2019-11-07 RX ORDER — POLYETHYLENE GLYCOL 3350 17 G/17G
17 POWDER, FOR SOLUTION ORAL DAILY PRN
Status: DISCONTINUED | OUTPATIENT
Start: 2019-11-07 | End: 2019-11-10 | Stop reason: HOSPADM

## 2019-11-07 RX ORDER — ONDANSETRON 4 MG/1
4 TABLET, ORALLY DISINTEGRATING ORAL EVERY 6 HOURS PRN
Status: DISCONTINUED | OUTPATIENT
Start: 2019-11-07 | End: 2019-11-10 | Stop reason: HOSPADM

## 2019-11-07 RX ORDER — DEXTROSE MONOHYDRATE 25 G/50ML
25-50 INJECTION, SOLUTION INTRAVENOUS
Status: DISCONTINUED | OUTPATIENT
Start: 2019-11-07 | End: 2019-11-10 | Stop reason: HOSPADM

## 2019-11-07 RX ORDER — LIDOCAINE 40 MG/G
CREAM TOPICAL
Status: DISCONTINUED | OUTPATIENT
Start: 2019-11-07 | End: 2019-11-10 | Stop reason: HOSPADM

## 2019-11-07 RX ORDER — NICOTINE POLACRILEX 4 MG
15-30 LOZENGE BUCCAL
Status: DISCONTINUED | OUTPATIENT
Start: 2019-11-07 | End: 2019-11-10 | Stop reason: HOSPADM

## 2019-11-07 RX ORDER — NALOXONE HYDROCHLORIDE 0.4 MG/ML
.1-.4 INJECTION, SOLUTION INTRAMUSCULAR; INTRAVENOUS; SUBCUTANEOUS
Status: DISCONTINUED | OUTPATIENT
Start: 2019-11-07 | End: 2019-11-10 | Stop reason: HOSPADM

## 2019-11-07 RX ORDER — ONDANSETRON 2 MG/ML
4 INJECTION INTRAMUSCULAR; INTRAVENOUS EVERY 6 HOURS PRN
Status: DISCONTINUED | OUTPATIENT
Start: 2019-11-07 | End: 2019-11-10 | Stop reason: HOSPADM

## 2019-11-07 RX ORDER — AMLODIPINE BESYLATE 5 MG/1
5 TABLET ORAL DAILY
Status: DISCONTINUED | OUTPATIENT
Start: 2019-11-08 | End: 2019-11-10 | Stop reason: HOSPADM

## 2019-11-07 RX ORDER — MULTIPLE VITAMINS W/ MINERALS TAB 9MG-400MCG
1 TAB ORAL DAILY
Status: DISCONTINUED | OUTPATIENT
Start: 2019-11-08 | End: 2019-11-10 | Stop reason: HOSPADM

## 2019-11-07 RX ADMIN — TAZOBACTAM SODIUM AND PIPERACILLIN SODIUM 3.38 G: 375; 3 INJECTION, SOLUTION INTRAVENOUS at 21:03

## 2019-11-07 RX ADMIN — GABAPENTIN 300 MG: 300 CAPSULE ORAL at 21:40

## 2019-11-07 RX ADMIN — VANCOMYCIN HYDROCHLORIDE 1500 MG: 10 INJECTION, POWDER, LYOPHILIZED, FOR SOLUTION INTRAVENOUS at 22:07

## 2019-11-07 ASSESSMENT — ACTIVITIES OF DAILY LIVING (ADL)
FALL_HISTORY_WITHIN_LAST_SIX_MONTHS: NO
BATHING: 0-->INDEPENDENT
TRANSFERRING: 0-->INDEPENDENT
COGNITION: 0 - NO COGNITION ISSUES REPORTED
TOILETING: 0-->INDEPENDENT
RETIRED_COMMUNICATION: 0-->UNDERSTANDS/COMMUNICATES WITHOUT DIFFICULTY
SWALLOWING: 0-->SWALLOWS FOODS/LIQUIDS WITHOUT DIFFICULTY
DRESS: 0-->INDEPENDENT
FALL_HISTORY_WITHIN_LAST_SIX_MONTHS: NO
TOILETING: 0-->INDEPENDENT
AMBULATION: 0-->INDEPENDENT
SWALLOWING: 0-->SWALLOWS FOODS/LIQUIDS WITHOUT DIFFICULTY
COGNITION: 0 - NO COGNITION ISSUES REPORTED
DRESS: 0-->INDEPENDENT
RETIRED_EATING: 0-->INDEPENDENT
BATHING: 0-->INDEPENDENT
TRANSFERRING: 0-->INDEPENDENT
AMBULATION: 0-->INDEPENDENT
ADLS_ACUITY_SCORE: 11
RETIRED_COMMUNICATION: 0-->UNDERSTANDS/COMMUNICATES WITHOUT DIFFICULTY
RETIRED_EATING: 0-->INDEPENDENT

## 2019-11-07 ASSESSMENT — MIFFLIN-ST. JEOR
SCORE: 1979.98
SCORE: 1926.69

## 2019-11-07 NOTE — PHARMACY-VANCOMYCIN DOSING SERVICE
Pharmacy Vancomycin Initial Note  Date of Service 2019  Patient's  1960  59 year old, male    Indication: Osteomyelitis    Current estimated CrCl = CrCl cannot be calculated (Unknown ideal weight.).    Creatinine for last 3 days  No results found for requested labs within last 72 hours.    Recent Vancomycin Level(s) for last 3 days  No results found for requested labs within last 72 hours.      Vancomycin IV Administrations (past 72 hours)      No vancomycin orders with administrations in past 72 hours.                Nephrotoxins and other renal medications (From now, onward)    Start     Dose/Rate Route Frequency Ordered Stop    19 1745  vancomycin 1500 mg in 0.9% NaCl 250 ml intermittent infusion 1,500 mg      1,500 mg  over 90 Minutes Intravenous EVERY 8 HOURS 19 1739      19 1730  piperacillin-tazobactam (ZOSYN) infusion 3.375 g     Note to Pharmacy:  Pharmacy can adjust dose based on renal function.    3.375 g  100 mL/hr over 30 Minutes Intravenous EVERY 6 HOURS 19 1723            Contrast Orders - past 72 hours (72h ago, onward)    None                Plan:  1.  Start vancomycin  1500 mg IV q8h.   2.  Goal Trough Level: 15-20 mg/L   3.  Pharmacy will check trough levels as appropriate in 1-3 Days.    4. Serum creatinine levels will be ordered daily for the first week of therapy and at least twice weekly for subsequent weeks.    5. [place] method utilized to dose vancomycin therapy: Method 1    Jaime Sylvester [facility]

## 2019-11-07 NOTE — LETTER
"    11/7/2019         RE: Crispin Rojas  [address] [address]        Dear Colleague,    Thank you for referring your patient, Crispin Rojas, to the [facility]. Please see a copy of my visit note below.    PATIENT HISTORY:  Crispin Rojas is a 59 year old male who presents to clinic for a foot \"callus.\"  Pt reports a wound on his L 2nd toe for several weeks.  He went to the ED where pt states he was told there was no bone infection.  MRI was done.  He is on oral abx and redness is increasing.  5/10 pain.  Worse with activity.  Better with rest.  No fevers, chills.  Nonsmoker.  Pt is working.  Diabetic with hx of prior amputations.  Rest of 10 pt ROS neg except for HPI.     EXAM:Vitals: /70   Pulse 66   Ht 1.816 m (5' 11.5\")   Wt 112.5 kg (248 lb)   BMI 34.11 kg/m     BMI= Body mass index is 34.11 kg/m .    General appearance: Patient is alert and fully cooperative with history & exam.  No sign of distress is noted during the visit.     Dermatologic:  Tip of L 2nd toe with ulcer that probes to bone.  Erythema of toe.  Tip of R 2nd toe with preulcerative callus, intralesional bleeding.   After debriding this with a 15 blade, no wound noted.    Vascular: DP & PT pulses are intact & regular bilaterally.   b/l LE edema.  CFT and skin temperature are normal to both lower extremities.     Neurologic: Lower extremity sensation is diminished to light touch b/l.     Musculoskeletal: s/p b/l digital amputations (L 1st, R 1st and 4th).  Hammertoe deformities noted.  Patient is ambulatory without assistive device or brace.  No gross ankle deformity noted.  No foot or ankle joint effusion is noted.    MR of L foot reviewed with pt.  Noted overread.    CONCLUSIONS:  1. Signal abnormality within the tuft of the second toe is worrisome for osteomyelitis. Additional signal abnormality within the distal aspect of the middle phalanx of the second toe is less definitive and may " be reactive but osteomyelitis cannot be   excluded. This represents a disagreement with the original report.  2. Amputation of the great toe.  3. Dorsal dislocation of the second toe. There is a small joint effusion with synovial proliferation and edema, but this is unlikely to represent a septic arthropathy.  4. There is fluid within the flexor digitorum tendon to the second toe, which may represent extension of joint fluid from disruption of the tarsal/plantar plate. A toxic tenosynovitis cannot be completely excluded, but is considered unlikely.  5. No evidence for fracture.  6. No evidence for abscess.  7. Nonspecific edema or cellulitis along the dorsal aspect of the foot.     Final Interpretation Dictated by:  Subhash Bond MD  Date: 10/31/2019     ASSESSMENT:   L 2nd toe osteomyelitis  DM II with peripheral polyneuropathy     PLAN:  Reviewed patient's chart in epic.  Discussed condition and treatment options including pros and cons.    I advised inpatient admission for IV abx, toe amputation.  Pt agreed.  Arranged direct admit to [facility].  Procedure:   After verbal consent, excisional debridement was performed on L 2nd toe ulcer.   Tissue nipper was used to debride ulcer down to and including subcutaneous tissue. No anesthesia was used due to neuropathy. Dressing applied to foot.  Patient tolerated procedure well.    Jack Younger DPM, FACFAS    Weight management plan: Patient was referred to their PCP to discuss a diet and exercise plan.        Again, thank you for allowing me to participate in the care of your patient.        Sincerely,        Jack Younger DPM

## 2019-11-07 NOTE — PATIENT INSTRUCTIONS
"Proceed to [facility] info desk for admission        Thank you for choosing [facility]!    DR. HOOKER'S CLINIC LOCATIONS     MONDAY  [place] TUESDAY & FRIDAY AM  [place]   [address] [facility]   [address]   [place] [address] [place] [address]   [phone]  [phone]  [phone]       WEDNESDAY  [place] SCHEDULE SURGERY: [phone]   [address] [facility] APPOINTMENTS: [phone]   [place] [address] BILLING QUESTIONS: [phone]   [phone]         DIABETES AND YOUR FEET  Diabetes can result in several problems in the feet including ulcers (open sores) and amputations. Two of the most important reasons why people develop foot problems when they have diabetes is : 1. Neuropathy (loss of feeling)  2. Vascular disease (loss or decrease of blood flow).    Neuropathy is a term used to describe a loss of nerve function.  Patients with diabetes are at risk of developing neuropathy if their sugars continue to run high and are above the normal value. One theory for neuropathy is that the \"extra\" sugar in the body enters the nerves and is broken down. These by-products build up in the nerve causing it to swell and impairing nerve function. Often times, this can be prevented by controlling your sugars, dieting and exercise.    When a person develops neuropathy, they usually begin to feel numbness or tingling in their feet and sometime in their legs.  Other symptoms may include painful burning or hot feet, tingling or feeling like insects or ants are crawling on your feet or legs.  If the diabetes is sever and the sugars run high for long periods of time, neuropathy can also occur in the hands.    Vascular disease  is a term used to describe a loss or decrease in circulation (blood flow). There is a problem in getting blood and oxygen to areas that need it. Similar to neuropathy, sugars can build up in the " walls of the arteries (blood vessels) and cause them to become swollen, thickened and hardened. This decreases the amount of blood that can go to an area that needs it. Though this is common in the legs of diabetic patients, it can also affect other arteries (blood vessels) in the body such as in the heart and eyes.    In the legs, vascular disease usually results in cramping. Patients who develop leg cramps after walking the same distance every time (i.e. One block, half a mile, ect.) need to let their doctors know so that their circulation may be checked. Cramps causing severe pain in the feet and/or legs while sleeping and the cramps go away when you stand or hang your legs off the side of the bed, may also be a sign of poor blood circulation.  Occasional cramping in cold weather or on rare occasions with activity may not be due to poor circulation, but you should inform your doctor.    PREVENTION OF THESE DISEASES  The key to prevention is good blood sugar control. Poor blood sugar control is a big reason many of these problems start. Physical activity (exercise) is a very good way to help decrease your blood sugars. Exercise can lower your blood sugar, blood pressure, and cholesterol. It also reduces your risk for heart disease and stroke, relieves stress, and strengthens your heart, muscles and bones.  In addition, regular activity helps insulin work better, improves your blood circulation, and keeps your joints flexible. If you're trying to lose weight, a combination of exercise and wise food choices can help you reach your target weight and maintain it.      PAIN MANAGEMENT  1.Blood Sugar Control - Most important  2. Medications such as:  Amytriptylline, duloxetine, gabapentin, lyrica, tramadol  3. Nutritional therapy:  Vitamin B6 (100mg daily), Vitamin B12 (75mcg daily), Vitamin D 2000 IU daily), Alpha-Lipoic Acid (600-1800mg daily), Acetyl-L-Carnitine (500-1000mg TID, L-methyl folate (1500mcg daily)    **  Metformin can block Vitamin B6 and B12 so it is important to supplement**    FOOT CARE RECOMMENDATIONS   1. Wash your feet with lukewarm water and a mild soap and then dry them thoroughly, especially between the toes.     2. Examine your feet daily looking for cuts, corns, blisters, cracks, ect, especially after wearing new shoes. Make sure to look between your toes. If you cannot see the bottom of your feet, set a mirror on the floor and hold your foot over it, or ask a spouse, friend or family member to examine your feet for you. Contact your doctor immediately if new problems are noted or if sores are not healing.     3. Immediately apply moisturizer to the tops and bottoms of your feet, avoiding areas between the toes. Hand lotion (Intesive Care, Candie, Eucerin, Neutrogena, Curel, ect) is sufficient unless your doctor prescribes a medicated lotion. Apply sunscreen to your feet when going swimming outside.     4. Use clean comfortable shoes, wear white socks (if you have any bleeding or drainage, you will see it on white socks). Socks should not have thick seams or cut off the circulation around the leg. Break in new shoes slowly and rotate with older shoes until broken in. Check the inside of your shoes with your hand to look for areas of irritation or objects that may have fallen into your shoes.       5. Keep slippers by the side of your bed for use during the night.     6.  Shoes should be fitted by a professional and should not cause areas of irritation.  Check your feet regularly when wearing a new pair of shoes and replace them as needed.     7.  Talk to your doctor about proper exercise. Exercise and stretching stimulate blood flow to your feet and maintain proper glucose levels.     8.  Monitor your blood glucose level as instructed by your doctor. Notify your doctor immediately if your blood sugar is abnormally high or low.    9. Cut your nails straight across, but then gently round any sharp edges with  a cardboard nail file. If you have neuropathy, peripheral vascular disease or cannot see that well to trim your own toenails contact [employer] ([phone]) or [facility] ([phone]).      THINGS TO AVOID DOING   1.  Do not soak your feet if you have an open sore. Use only lukewarm water and always check the temperature with your hand as hot water can easily burn your feet.       2.  Never use a hot water bottle or heating pad on your feet. Also do not apply cold compresses to your feet. With decreased sensation, you could burn or freeze your feet.       3.  Do not apply any of these to your feet:    -  Over the counter medicine for corns or warts    -  Harsh chemicals like boric acid    -  Do not self-treat corns, cuts, blisters or infections. Always consult your doctor.       4.  Do not wear sandals, slippers or walk barefoot, especially on hot sand or concrete or other harsh surfaces.     5.  If you smoke, stop!!!            BODY WEIGHT AND YOUR FEET  The following information is included in the after visit summary for all patients. Body weight can be a sensitive issue to discuss in clinic, but we think the following information is very important. Although we focus on the feet and ankles, we do support the overall health of our patients.     Many things can cause foot and ankle problems. Foot structure, activity level, foot mechanics and injuries are common causes of pain. One very important issue that often goes unmentioned, is body weight. Extra weight can cause increased stress on muscles, ligaments, bones and tendons. Sometimes just a few extra pounds is all it takes to put one over her/his threshold. Without reducing that stress, it can be difficult to alleviate pain. As Foot & Ankle specialists, our job is addressing the lower extremity problem and possible causes. Regarding extra body weight, we encourage patients to discuss diet and weight management plans with their primary care doctors. It is  this team approach that gives you the best opportunity for pain relief and getting you back on your feet.      [facility] has a Comprehensive Weight Management Program. This program includes counseling, education, non-surgical and surgical approaches to weight loss. If you are interested in learning more either talk to you primary care provider or call [phone].

## 2019-11-07 NOTE — H&P
[facility]    History and Physical - Hospitalist Service       Date of Admission:  11/7/2019     Assessment & Plan   Crispin Rojas is a 59 year old male with a history of insulin-dependent type 2 diabetes, hypertension, history of osteomyelitis requiring toe amputation, who is admitted to the hospitalist service for management of osteomyelitis.    Osteomyelitis of left second toe  - Patient had chronic non-healing wound of left second toe. Presented on 10/31 with redness/cellulitis in that area. MRI on 10/31 confirmed osteomyelitis of that toe.  - Seen in podiatry clinic on day of admission who recommended admission to the hospital for IV antibiotics prior to surgery  - No signs of systemic illness or sepsis on admission  - Check ESR, CRP, blood cultures on admission  - Vancomycin and Zosyn empirically per podiatry.   - NPO at midnight for planned surgery tomorrow  - Consider ID consult depending upon results of surgery    Chronic problems:  IDDM II: Home insulin is degludec 55 units daily, 1 unit:9 g carbs with meals, low dose sliding scale insulin. Also takes tresiba and metfomin. Hold orals here. Hold degludec until after surgery, resume carb coverage and sliding scale insulin.  HTN: Continue amlodipine. Hold lisinopril before surgery.  Neuropathy: continue gabapentin.  HLD: Continue atorvastatin.      Diet: Regular then NPO at midnight  DVT Prophylaxis:  Mechanical   Mccarthy Catheter:  No  Code Status: Full    Disposition Plan   Expected discharge: 2 - 3 days, anticipate return to home +/- IV antibiotics.   Entered: Christopher Lopez MD at 5:44 PM     Christopher Lopez MD  [facility]    ______________________________________________________________________    Chief Complaint   Foot wound    History of Present Illness   Crispin Rojas is a 59 year old male with a history of insulin-dependent type 2 diabetes, hypertension, history of osteomyelitis requiring toe amputation,  who is admitted to the hospitalist service for management of osteomyelitis.    Patient reports that for the past month or so he has been dealing with a left second toe wound.  He has been following with podiatry for that.  On 10/31, he noticed some redness in the area and was concerned there might be infection.  He presented to the emergency department here at that time.  On exam he appeared to have cellulitis.  Patient was discharged to home on oral antibiotics as the preliminary read of the MRI showed no sign for osteomyelitis.  However, on over read of the MRI, there was concern for left second toe osteomyelitis as well as dorsal dislocation of that toe.  He had a regularly scheduled appointment with podiatry today.  Because of his osteomyelitis they recommended admission to the hospital for IV antibiotics and surgery tomorrow.    The patient states that he has otherwise been feeling well.  He denies having any overt fevers.  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  He has not noticed much in the way of drainage from his wound.  Has not had any chest pain, shortness of breath, diarrhea, nausea, vomiting or any other concerning symptoms.  Besides the antibiotics which were prescribed last week, he has had no change to his medications.    Review of Systems    The 10 point Review of Systems is negative other than noted in the HPI or here.     Physical Exam   /65 (BP Location: Right arm)   Pulse 68   Temp 96.8  F (36  C) (Oral)   Resp 18   SpO2 97%        General: No acute distress, appears well.     HEENT: No scleral icterus. Oropharynx moist.     Neck: Supple. Normal range of motion.     Pulmonary: Normal work of breathing. Clear to auscultation bilaterally.    Cardiovascular: Regular rate and rhythm without murmur or extra heart sounds.    Abdomen: Soft and non-tender.    Extremities: No peripheral edema. No clubbing or  cyanosis. Multiple previous toe amputations noted. Left great toe absent. Left second toe with distal ulcer, not draining. No significant pain with palpation.     Neurologic: Awake, alert, appropriate.    Skin: Warm and dry.    Psychiatric: Normal affect and mood.     Data   Data reviewed today: I reviewed all medications, new labs and imaging results over the last 24 hours. I personally reviewed no images or EKG's today.    Recent Labs   Lab 11/07/19  1817   WBC 5.4   HGB 11.5*   MCV 90      INR 1.08      POTASSIUM 4.3   CHLORIDE 108   CO2 25   BUN 25   CR 0.89   ANIONGAP 5   NARA 8.9   *   ALBUMIN 3.6   PROTTOTAL 7.5   BILITOTAL 0.4   ALKPHOS 48   ALT 42   AST 27       Past Medical History    I have reviewed this patient's medical history and updated it with pertinent information if needed.   Past Medical History:   Diagnosis Date     Cerebral infarction (H)      Chronic infection      H/O heartburn      History of heartburn      Hypertension      Numbness and tingling      Obesity      GILA (obstructive sleep apnea) 10/22/2018     Renal stone      Type II or unspecified type diabetes mellitus without mention of complication, not stated as uncontrolled         Past Surgical History    I have reviewed this patient's surgical history and updated it with pertinent information if needed.  Past Surgical History:   Procedure Laterality Date     AMPUTATE FOOT Left 8/18/2016    Procedure: AMPUTATE FOOT;  Surgeon: Jack Younger DPM;  Location: [facility] OR     AMPUTATE TOE(S) Right 2/1/2016    Procedure: AMPUTATE TOE(S);  Surgeon: Rachelle Manriquez DPM, Pod;  Location: [facility] OR     AMPUTATE TOE(S) Right 8/2/2019    Procedure: Right fourth toe partial amputation for treatment of osteomyelitis.;  Surgeon: Jack Younger DPM;  Location: [facility] OR     ANGIOGRAM       COLONOSCOPY       COMBINED CYSTOSCOPY, RETROGRADES, URETEROSCOPY, INSERT STENT Left 8/21/2016    Procedure: COMBINED CYSTOSCOPY, RETROGRADES,  URETEROSCOPY, INSERT STENT;  Surgeon: Artemio Valenzuela MD;  Location: [facility] OR     COMBINED CYSTOSCOPY, RETROGRADES, URETEROSCOPY, LASER HOLMIUM LITHOTRIPSY URETER(S), INSERT STENT Left 10/3/2016    Procedure: COMBINED CYSTOSCOPY, RETROGRADES, URETEROSCOPY, LASER HOLMIUM LITHOTRIPSY URETER(S), INSERT STENT;  Surgeon: Artemio Valenzuela MD;  Location: [facility] OR     EXTRACORPOREAL SHOCK WAVE LITHOTRIPSY (ESWL) Left 2016    Procedure: EXTRACORPOREAL SHOCK WAVE LITHOTRIPSY (ESWL);  Surgeon: Artemio Valenzuela MD;  Location: [facility] OR     RECESSION GASTROCNEMIUS Right 2016    Procedure: RECESSION GASTROCNEMIUS;  Surgeon: Rachelle Manriquez, DPM, Pod;  Location: [facility] OR        Social History    I have reviewed this patient's social history and updated it with pertinent information if needed.  Social History     Tobacco Use     Smoking status: Never Smoker     Smokeless tobacco: Never Used   Substance Use Topics     Alcohol use: Yes     Alcohol/week: 0.0 standard drinks     Comment: rare---red wine 2x per month     Drug use: No          Family History    I have reviewed this patient's family history and updated it with pertinent information if needed.   Family History   Problem Relation Age of Onset     Arthritis Mother         SLE     Connective Tissue Disorder Mother         lupus     Diabetes Mother      Cerebrovascular Disease Mother      Cancer Mother      Diabetes Father      Diabetes Maternal Grandfather      Diabetes Sister           Prior to Admission Medications    Prior to Admission Medications   Prescriptions Last Dose Informant Patient Reported? Taking?   CIALIS 5 MG tablet   No No   Sig: TAKE 1 TABLET BY MOUTH EVERY DAY AS NEEDED   Cholecalciferol (VITAMIN D3 PO)   Yes No   Sig: Take 1,000 Units by mouth daily    Co-Enzyme Q10 100 MG CAPS   Yes No   Sig: Take 100 mg by mouth daily    Continuous Blood Gluc Sensor (FREESTYLE LUCERO 14 DAY SENSOR) MIS   No No   Si each every 14 days   Insulin Pen  Needle (PEN NEEDLES) 31G X 5 MM MISC   No No   Si Device 5 times daily , TWICE DAILY WITH LANTUS AND 3 TIMES A DAY PRN WITH NOVOLOG FLEXPEN   MAGNESIUM GLYCINATE PLUS PO   Yes No   Sig: Take 400 mg by mouth 2 times daily   Multiple Vitamins-Minerals (MULTIVITAMIN PO)   Yes No   Sig: Take 1 tablet by mouth daily    Omega-3 Fatty Acids (OMEGA-3 FISH OIL PO)   Yes No   Sig: Take 1 g by mouth 2 times daily (with meals)    amLODIPine (NORVASC) 5 MG tablet   No No   Sig: TAKE 1 TABLET BY MOUTH DAILY   aspirin (ASA) 325 MG EC tablet   Yes No   Sig: Take 325 mg by mouth daily   atorvastatin (LIPITOR) 40 MG tablet   No No   Sig: TAKE 1 TABLET BY MOUTH EVERY DAY   blood glucose (NO BRAND SPECIFIED) lancets standard   No No   Sig: Use to test blood sugar 3 times daily or as directed.   blood glucose monitoring (NO BRAND SPECIFIED) meter device kit   No No   Sig: Use to test blood sugar 3 times daily or as directed.   blood glucose monitoring (NO BRAND SPECIFIED) test strip   No No   Sig: Use to test blood sugars 3 times daily or as directed   ciprofloxacin (CIPRO) 500 MG tablet   No No   Sig: Take 1 tablet (500 mg) by mouth 2 times daily   clindamycin (CLEOCIN) 300 MG capsule   No No   Sig: Take 1 capsule (300 mg) by mouth 4 times daily   gabapentin (NEURONTIN) 100 MG capsule   Yes No   Sig: Take 300 mg by mouth 3 times daily   insulin degludec (TRESIBA FLEXTOUCH) 100 UNIT/ML pen   No No   Sig: Inject 55 Units Subcutaneous daily   insulin lispro (HUMALOG PEN) 100 UNIT/ML (1 unit dial) pen   No No   Si units per 9 grams of carbohydrate before each meal + 1:16 over 150 correction.  TDD 70 units   ketoconazole (NIZORAL) 2 % external shampoo   Yes No   Sig: Apply topically daily as needed , apply daily and wash off after 5 min   ketoconazole (NIZORAL) 2 % external shampoo   No No   Sig: APPLY EXTERNALLY 2 TIMES A WEEK AS DIRECTED   lisinopril (PRINIVIL/ZESTRIL) 40 MG tablet   No No   Sig: Take 1 tablet (40 mg) by mouth  daily   metFORMIN (GLUCOPHAGE-XR) 500 MG 24 hr tablet   No No   Sig: TAKE 2 TABLETS BY MOUTH TWICE DAILY WITH MEALS   semaglutide (OZEMPIC, 1 MG/DOSE,) 2 MG/1.5ML pen   No No   Sig: Inject 1 mg Subcutaneous every 7 days , on Wednesdays      Facility-Administered Medications: None        Allergies    Allergies   Allergen Reactions     Niacin Swelling     SIMCOR caused edema in extremities     Simvastatin Swelling     SIMCOR caused edema in extremities

## 2019-11-08 ENCOUNTER — ANESTHESIA EVENT (OUTPATIENT)
Dept: SURGERY | Facility: CLINIC | Age: 59
End: 2019-11-08
Payer: COMMERCIAL

## 2019-11-08 ENCOUNTER — APPOINTMENT (OUTPATIENT)
Dept: GENERAL RADIOLOGY | Facility: CLINIC | Age: 59
End: 2019-11-08
Attending: PODIATRIST
Payer: COMMERCIAL

## 2019-11-08 ENCOUNTER — ANESTHESIA (OUTPATIENT)
Dept: SURGERY | Facility: CLINIC | Age: 59
End: 2019-11-08
Payer: COMMERCIAL

## 2019-11-08 PROBLEM — Z98.890 POST-OPERATIVE STATE: Status: ACTIVE | Noted: 2019-11-08

## 2019-11-08 LAB
ANION GAP SERPL CALCULATED.3IONS-SCNC: 5 MMOL/L (ref 3–14)
BUN SERPL-MCNC: 18 MG/DL (ref 7–30)
CALCIUM SERPL-MCNC: 8.7 MG/DL (ref 8.5–10.1)
CHLORIDE SERPL-SCNC: 109 MMOL/L (ref 94–109)
CO2 SERPL-SCNC: 26 MMOL/L (ref 20–32)
CREAT SERPL-MCNC: 0.9 MG/DL (ref 0.66–1.25)
ERYTHROCYTE [DISTWIDTH] IN BLOOD BY AUTOMATED COUNT: 14 % (ref 10–15)
GFR SERPL CREATININE-BSD FRML MDRD: >90 ML/MIN/{1.73_M2}
GLUCOSE BLDC GLUCOMTR-MCNC: 116 MG/DL (ref 70–99)
GLUCOSE BLDC GLUCOMTR-MCNC: 117 MG/DL (ref 70–99)
GLUCOSE BLDC GLUCOMTR-MCNC: 135 MG/DL (ref 70–99)
GLUCOSE BLDC GLUCOMTR-MCNC: 135 MG/DL (ref 70–99)
GLUCOSE BLDC GLUCOMTR-MCNC: 146 MG/DL (ref 70–99)
GLUCOSE BLDC GLUCOMTR-MCNC: 174 MG/DL (ref 70–99)
GLUCOSE BLDC GLUCOMTR-MCNC: 192 MG/DL (ref 70–99)
GLUCOSE SERPL-MCNC: 191 MG/DL (ref 70–99)
HCT VFR BLD AUTO: 38.6 % (ref 40–53)
HGB BLD-MCNC: 12.3 G/DL (ref 13.3–17.7)
MCH RBC QN AUTO: 28.5 PG (ref 26.5–33)
MCHC RBC AUTO-ENTMCNC: 31.9 G/DL (ref 31.5–36.5)
MCV RBC AUTO: 90 FL (ref 78–100)
PLATELET # BLD AUTO: 155 10E9/L (ref 150–450)
POTASSIUM SERPL-SCNC: 4.3 MMOL/L (ref 3.4–5.3)
RBC # BLD AUTO: 4.31 10E12/L (ref 4.4–5.9)
SODIUM SERPL-SCNC: 140 MMOL/L (ref 133–144)
VANCOMYCIN SERPL-MCNC: 18.2 MG/L
WBC # BLD AUTO: 5 10E9/L (ref 4–11)

## 2019-11-08 PROCEDURE — 36415 COLL VENOUS BLD VENIPUNCTURE: CPT | Performed by: INTERNAL MEDICINE

## 2019-11-08 PROCEDURE — 25000132 ZZH RX MED GY IP 250 OP 250 PS 637: Performed by: PODIATRIST

## 2019-11-08 PROCEDURE — 37000009 ZZH ANESTHESIA TECHNICAL FEE, EACH ADDTL 15 MIN: Performed by: PODIATRIST

## 2019-11-08 PROCEDURE — 88305 TISSUE EXAM BY PATHOLOGIST: CPT | Mod: 26 | Performed by: PODIATRIST

## 2019-11-08 PROCEDURE — 25800030 ZZH RX IP 258 OP 636: Performed by: INTERNAL MEDICINE

## 2019-11-08 PROCEDURE — 25000128 H RX IP 250 OP 636: Performed by: NURSE ANESTHETIST, CERTIFIED REGISTERED

## 2019-11-08 PROCEDURE — 40000986 XR FOOT PORT LT 3 VW: Mod: LT

## 2019-11-08 PROCEDURE — 00000146 ZZHCL STATISTIC GLUCOSE BY METER IP

## 2019-11-08 PROCEDURE — 80048 BASIC METABOLIC PNL TOTAL CA: CPT | Performed by: INTERNAL MEDICINE

## 2019-11-08 PROCEDURE — 40000306 ZZH STATISTIC PRE PROC ASSESS II: Performed by: PODIATRIST

## 2019-11-08 PROCEDURE — 0Y6S0Z0 DETACHMENT AT LEFT 2ND TOE, COMPLETE, OPEN APPROACH: ICD-10-PCS | Performed by: PODIATRIST

## 2019-11-08 PROCEDURE — 71000027 ZZH RECOVERY PHASE 2 EACH 15 MINS: Performed by: PODIATRIST

## 2019-11-08 PROCEDURE — 36000056 ZZH SURGERY LEVEL 3 1ST 30 MIN: Performed by: PODIATRIST

## 2019-11-08 PROCEDURE — 28820 AMPUTATION OF TOE: CPT | Mod: T1 | Performed by: PODIATRIST

## 2019-11-08 PROCEDURE — 25000125 ZZHC RX 250: Performed by: NURSE ANESTHETIST, CERTIFIED REGISTERED

## 2019-11-08 PROCEDURE — 85027 COMPLETE CBC AUTOMATED: CPT | Performed by: INTERNAL MEDICINE

## 2019-11-08 PROCEDURE — 25000128 H RX IP 250 OP 636: Performed by: INTERNAL MEDICINE

## 2019-11-08 PROCEDURE — 12000000 ZZH R&B MED SURG/OB

## 2019-11-08 PROCEDURE — 25800030 ZZH RX IP 258 OP 636: Performed by: PODIATRIST

## 2019-11-08 PROCEDURE — 88305 TISSUE EXAM BY PATHOLOGIST: CPT | Performed by: PODIATRIST

## 2019-11-08 PROCEDURE — 27210794 ZZH OR GENERAL SUPPLY STERILE: Performed by: PODIATRIST

## 2019-11-08 PROCEDURE — 99232 SBSQ HOSP IP/OBS MODERATE 35: CPT | Performed by: INTERNAL MEDICINE

## 2019-11-08 PROCEDURE — 36000058 ZZH SURGERY LEVEL 3 EA 15 ADDTL MIN: Performed by: PODIATRIST

## 2019-11-08 PROCEDURE — 25000128 H RX IP 250 OP 636: Performed by: PODIATRIST

## 2019-11-08 PROCEDURE — 88311 DECALCIFY TISSUE: CPT | Performed by: PODIATRIST

## 2019-11-08 PROCEDURE — 37000008 ZZH ANESTHESIA TECHNICAL FEE, 1ST 30 MIN: Performed by: PODIATRIST

## 2019-11-08 PROCEDURE — 25000132 ZZH RX MED GY IP 250 OP 250 PS 637: Performed by: INTERNAL MEDICINE

## 2019-11-08 PROCEDURE — 88311 DECALCIFY TISSUE: CPT | Mod: 26 | Performed by: PODIATRIST

## 2019-11-08 PROCEDURE — 80202 ASSAY OF VANCOMYCIN: CPT | Performed by: INTERNAL MEDICINE

## 2019-11-08 RX ORDER — HYDROMORPHONE HYDROCHLORIDE 1 MG/ML
.3-.5 INJECTION, SOLUTION INTRAMUSCULAR; INTRAVENOUS; SUBCUTANEOUS EVERY 10 MIN PRN
Status: CANCELLED | OUTPATIENT
Start: 2019-11-08

## 2019-11-08 RX ORDER — NICOTINE POLACRILEX 4 MG
15-30 LOZENGE BUCCAL
Status: DISCONTINUED | OUTPATIENT
Start: 2019-11-08 | End: 2019-11-08

## 2019-11-08 RX ORDER — BUPIVACAINE HYDROCHLORIDE 5 MG/ML
INJECTION, SOLUTION EPIDURAL; INTRACAUDAL PRN
Status: DISCONTINUED | OUTPATIENT
Start: 2019-11-08 | End: 2019-11-08 | Stop reason: HOSPADM

## 2019-11-08 RX ORDER — PROPOFOL 10 MG/ML
INJECTION, EMULSION INTRAVENOUS PRN
Status: DISCONTINUED | OUTPATIENT
Start: 2019-11-08 | End: 2019-11-08

## 2019-11-08 RX ORDER — SODIUM CHLORIDE, SODIUM LACTATE, POTASSIUM CHLORIDE, CALCIUM CHLORIDE 600; 310; 30; 20 MG/100ML; MG/100ML; MG/100ML; MG/100ML
INJECTION, SOLUTION INTRAVENOUS CONTINUOUS
Status: DISCONTINUED | OUTPATIENT
Start: 2019-11-08 | End: 2019-11-08 | Stop reason: HOSPADM

## 2019-11-08 RX ORDER — KETAMINE HYDROCHLORIDE 10 MG/ML
INJECTION INTRAMUSCULAR; INTRAVENOUS PRN
Status: DISCONTINUED | OUTPATIENT
Start: 2019-11-08 | End: 2019-11-08

## 2019-11-08 RX ORDER — NICOTINE POLACRILEX 4 MG
15-30 LOZENGE BUCCAL
Status: DISCONTINUED | OUTPATIENT
Start: 2019-11-08 | End: 2019-11-10 | Stop reason: HOSPADM

## 2019-11-08 RX ORDER — PROPOFOL 10 MG/ML
INJECTION, EMULSION INTRAVENOUS CONTINUOUS PRN
Status: DISCONTINUED | OUTPATIENT
Start: 2019-11-08 | End: 2019-11-08

## 2019-11-08 RX ORDER — ALBUTEROL SULFATE 0.83 MG/ML
2.5 SOLUTION RESPIRATORY (INHALATION) EVERY 4 HOURS PRN
Status: CANCELLED | OUTPATIENT
Start: 2019-11-08

## 2019-11-08 RX ORDER — LIDOCAINE 40 MG/G
CREAM TOPICAL
Status: DISCONTINUED | OUTPATIENT
Start: 2019-11-08 | End: 2019-11-08 | Stop reason: HOSPADM

## 2019-11-08 RX ORDER — FENTANYL CITRATE 50 UG/ML
25-50 INJECTION, SOLUTION INTRAMUSCULAR; INTRAVENOUS EVERY 5 MIN PRN
Status: CANCELLED | OUTPATIENT
Start: 2019-11-08

## 2019-11-08 RX ORDER — LIDOCAINE 40 MG/G
CREAM TOPICAL
Status: DISCONTINUED | OUTPATIENT
Start: 2019-11-08 | End: 2019-11-10 | Stop reason: HOSPADM

## 2019-11-08 RX ORDER — DEXTROSE MONOHYDRATE 25 G/50ML
25-50 INJECTION, SOLUTION INTRAVENOUS
Status: DISCONTINUED | OUTPATIENT
Start: 2019-11-08 | End: 2019-11-08

## 2019-11-08 RX ORDER — DEXTROSE MONOHYDRATE 25 G/50ML
25-50 INJECTION, SOLUTION INTRAVENOUS
Status: DISCONTINUED | OUTPATIENT
Start: 2019-11-08 | End: 2019-11-10 | Stop reason: HOSPADM

## 2019-11-08 RX ORDER — ONDANSETRON 4 MG/1
4 TABLET, ORALLY DISINTEGRATING ORAL EVERY 30 MIN PRN
Status: CANCELLED | OUTPATIENT
Start: 2019-11-08

## 2019-11-08 RX ORDER — NALOXONE HYDROCHLORIDE 0.4 MG/ML
.1-.4 INJECTION, SOLUTION INTRAMUSCULAR; INTRAVENOUS; SUBCUTANEOUS
Status: CANCELLED | OUTPATIENT
Start: 2019-11-08 | End: 2019-11-09

## 2019-11-08 RX ORDER — FENTANYL CITRATE 50 UG/ML
25-50 INJECTION, SOLUTION INTRAMUSCULAR; INTRAVENOUS
Status: CANCELLED | OUTPATIENT
Start: 2019-11-08

## 2019-11-08 RX ORDER — SODIUM CHLORIDE, SODIUM LACTATE, POTASSIUM CHLORIDE, CALCIUM CHLORIDE 600; 310; 30; 20 MG/100ML; MG/100ML; MG/100ML; MG/100ML
INJECTION, SOLUTION INTRAVENOUS CONTINUOUS
Status: CANCELLED | OUTPATIENT
Start: 2019-11-08

## 2019-11-08 RX ORDER — MEPERIDINE HYDROCHLORIDE 25 MG/ML
12.5 INJECTION INTRAMUSCULAR; INTRAVENOUS; SUBCUTANEOUS
Status: CANCELLED | OUTPATIENT
Start: 2019-11-08

## 2019-11-08 RX ORDER — ONDANSETRON 2 MG/ML
4 INJECTION INTRAMUSCULAR; INTRAVENOUS EVERY 30 MIN PRN
Status: CANCELLED | OUTPATIENT
Start: 2019-11-08

## 2019-11-08 RX ORDER — SODIUM CHLORIDE 9 MG/ML
INJECTION, SOLUTION INTRAVENOUS CONTINUOUS
Status: ACTIVE | OUTPATIENT
Start: 2019-11-08 | End: 2019-11-08

## 2019-11-08 RX ADMIN — PROPOFOL 20 MG: 10 INJECTION, EMULSION INTRAVENOUS at 17:55

## 2019-11-08 RX ADMIN — PROPOFOL 25 MCG/KG/MIN: 10 INJECTION, EMULSION INTRAVENOUS at 17:58

## 2019-11-08 RX ADMIN — TAZOBACTAM SODIUM AND PIPERACILLIN SODIUM 3.38 G: 375; 3 INJECTION, SOLUTION INTRAVENOUS at 20:03

## 2019-11-08 RX ADMIN — VANCOMYCIN HYDROCHLORIDE 1500 MG: 10 INJECTION, POWDER, LYOPHILIZED, FOR SOLUTION INTRAVENOUS at 08:25

## 2019-11-08 RX ADMIN — PROPOFOL 20 MG: 10 INJECTION, EMULSION INTRAVENOUS at 17:53

## 2019-11-08 RX ADMIN — SODIUM CHLORIDE: 9 INJECTION, SOLUTION INTRAVENOUS at 17:45

## 2019-11-08 RX ADMIN — VANCOMYCIN HYDROCHLORIDE 1500 MG: 10 INJECTION, POWDER, LYOPHILIZED, FOR SOLUTION INTRAVENOUS at 15:38

## 2019-11-08 RX ADMIN — GABAPENTIN 300 MG: 300 CAPSULE ORAL at 21:49

## 2019-11-08 RX ADMIN — VANCOMYCIN HYDROCHLORIDE 1500 MG: 10 INJECTION, POWDER, LYOPHILIZED, FOR SOLUTION INTRAVENOUS at 23:02

## 2019-11-08 RX ADMIN — MULTIPLE VITAMINS W/ MINERALS TAB 1 TABLET: TAB at 08:25

## 2019-11-08 RX ADMIN — AMLODIPINE BESYLATE 5 MG: 5 TABLET ORAL at 08:25

## 2019-11-08 RX ADMIN — MIDAZOLAM 2 MG: 1 INJECTION INTRAMUSCULAR; INTRAVENOUS at 17:45

## 2019-11-08 RX ADMIN — GABAPENTIN 300 MG: 300 CAPSULE ORAL at 08:25

## 2019-11-08 RX ADMIN — TAZOBACTAM SODIUM AND PIPERACILLIN SODIUM 3.38 G: 375; 3 INJECTION, SOLUTION INTRAVENOUS at 02:59

## 2019-11-08 RX ADMIN — PROPOFOL 20 MG: 10 INJECTION, EMULSION INTRAVENOUS at 17:56

## 2019-11-08 RX ADMIN — TAZOBACTAM SODIUM AND PIPERACILLIN SODIUM 3.38 G: 375; 3 INJECTION, SOLUTION INTRAVENOUS at 10:42

## 2019-11-08 RX ADMIN — Medication 20 MG: at 17:54

## 2019-11-08 RX ADMIN — SODIUM CHLORIDE: 9 INJECTION, SOLUTION INTRAVENOUS at 14:51

## 2019-11-08 RX ADMIN — ATORVASTATIN CALCIUM 40 MG: 40 TABLET, FILM COATED ORAL at 08:25

## 2019-11-08 RX ADMIN — Medication 100 MG: at 08:25

## 2019-11-08 RX ADMIN — TAZOBACTAM SODIUM AND PIPERACILLIN SODIUM 3.38 G: 375; 3 INJECTION, SOLUTION INTRAVENOUS at 14:51

## 2019-11-08 RX ADMIN — GABAPENTIN 300 MG: 300 CAPSULE ORAL at 15:39

## 2019-11-08 ASSESSMENT — ACTIVITIES OF DAILY LIVING (ADL)
ADLS_ACUITY_SCORE: 11

## 2019-11-08 NOTE — CONSULTS
CTS identifies pt as high risk due to elevated JENNIFER. Pt has had 4 admissions and 1 ER visit in the past 6 months. Currently admitted for osteomyelitis of left 2nd toe. History of DM2 w/ A1c of 5.9. Pt follows w/ Dr. Younger for Podiatry and was scheduled to have this toe amputated but was admitted for IV antibiotics prior to surgery. Anticipate that pt will continue to follow w/ Podiatry on discharge, no hospital follow-up appointment scheduled at this time.     Per 9/24 office visit note, Dr. Younger has discussed more protective shoe/inserts with pt as well as finding a more seated job to help reduce the amount of time pt spends on his feet.     Will not send hand off as no gap in care identified.      CM will continue to follow patient for any additional discharge needs.     Amber See RN BSN   Inpatient Care Coordination  [facility]   [phone]

## 2019-11-08 NOTE — PLAN OF CARE
Orientation: Alert and oriented x4  VSS. 93% on RA.   Tele: N/A. HR 64.   LS: Clear, denies SOB/GEORGE  GI: BS audible/active x4, NPO for surgery tomorrow,  this shift.  Passing gas. No BM this shift. Denies N/V.   : Adequate urine output. Yes  Skin: Intact  Activity: Independent. Pt slept comfortably throughout shift.   Pain: 0/10  Plan: Pain control, supportive cares, IV abx, surgery later today, discharge TBD. Continue with current cares.

## 2019-11-08 NOTE — PROGRESS NOTES
"PATIENT HISTORY:  Crispin Rojas is a 59 year old male who presents to clinic for a foot \"callus.\"  Pt reports a wound on his L 2nd toe for several weeks.  He went to the ED where pt states he was told there was no bone infection.  MRI was done.  He is on oral abx and redness is increasing.  5/10 pain.  Worse with activity.  Better with rest.  No fevers, chills.  Nonsmoker.  Pt is working.  Diabetic with hx of prior amputations.  Rest of 10 pt ROS neg except for HPI.     EXAM:Vitals: /70   Pulse 66   Ht 1.816 m (5' 11.5\")   Wt 112.5 kg (248 lb)   BMI 34.11 kg/m    BMI= Body mass index is 34.11 kg/m .    General appearance: Patient is alert and fully cooperative with history & exam.  No sign of distress is noted during the visit.     Dermatologic: Tip of L 2nd toe with ulcer that probes to bone.  Erythema of toe.  Tip of R 2nd toe with preulcerative callus, intralesional bleeding.  After debriding this with a 15 blade, no wound noted.    Vascular: DP & PT pulses are intact & regular bilaterally.  b/l LE edema.  CFT and skin temperature are normal to both lower extremities.     Neurologic: Lower extremity sensation is diminished to light touch b/l.     Musculoskeletal: s/p b/l digital amputations (L 1st, R 1st and 4th).  Hammertoe deformities noted.  Patient is ambulatory without assistive device or brace.  No gross ankle deformity noted.  No foot or ankle joint effusion is noted.    MR of L foot reviewed with pt.  Noted overread.    CONCLUSIONS:  1. Signal abnormality within the tuft of the second toe is worrisome for osteomyelitis. Additional signal abnormality within the distal aspect of the middle phalanx of the second toe is less definitive and may be reactive but osteomyelitis cannot be   excluded. This represents a disagreement with the original report.  2. Amputation of the great toe.  3. Dorsal dislocation of the second toe. There is a small joint effusion with synovial proliferation and edema, " but this is unlikely to represent a septic arthropathy.  4. There is fluid within the flexor digitorum tendon to the second toe, which may represent extension of joint fluid from disruption of the tarsal/plantar plate. A toxic tenosynovitis cannot be completely excluded, but is considered unlikely.  5. No evidence for fracture.  6. No evidence for abscess.  7. Nonspecific edema or cellulitis along the dorsal aspect of the foot.     Final Interpretation Dictated by:  Subhash Bond MD  Date: 10/31/2019     ASSESSMENT:   L 2nd toe osteomyelitis  DM II with peripheral polyneuropathy     PLAN:  Reviewed patient's chart in epic.  Discussed condition and treatment options including pros and cons.    I advised inpatient admission for IV abx, toe amputation.  Pt agreed.  Arranged direct admit to [facility].  Procedure:   After verbal consent, excisional debridement was performed on L 2nd toe ulcer.  Tissue nipper was used to debride ulcer down to and including subcutaneous tissue. No anesthesia was used due to neuropathy. Dressing applied to foot.  Patient tolerated procedure well.    Jack Younger DPM, FACFAS    Weight management plan: Patient was referred to their PCP to discuss a diet and exercise plan.

## 2019-11-08 NOTE — PHARMACY-ADMISSION MEDICATION HISTORY
Admission medication history interview status for this patient is complete. See [facility] admission navigator for allergy information, prior to admission medications and immunization status.     Medication history interview source(s):Patient  Medication history resources (including written lists, pill bottles, clinic record):None    Changes made to PTA medication list:  Added: none  Deleted: clindamycin (completed)  Changed: none    Actions taken by pharmacist (provider contacted, etc):None     Additional medication history information:None    Medication reconciliation/reorder completed by provider prior to medication history? Yes    For patients on insulin therapy: Yes--but RN waiting (Yes/No)   Lantus/levemir/NPH/Mix 70/30 dose: ___ in AM/PM or twice daily   Sliding scale Novolog Y/N   If Yes, do you have a baseline novolog pre-meal dose: ______units with meals   Patients eat three meals a day: Y/N ---  How many episodes of hypoglycemia (low blood glucose) do you have weekly: ---   How many missed doses do you have a week: ---  How many times do you check your blood glucose per day: ---  Any Barriers to therapy: cost of medications/comfortable with giving injections (if applicable)/ comfortable and confident with current diabetes regimen ---      Prior to Admission medications    Medication Sig Last Dose Taking? Auth Provider   amLODIPine (NORVASC) 5 MG tablet TAKE 1 TABLET BY MOUTH DAILY 11/7/2019 at Unknown time Yes Aden Lopez MD   aspirin (ASA) 325 MG EC tablet Take 325 mg by mouth daily 11/7/2019 at Unknown time Yes Reported, Patient   atorvastatin (LIPITOR) 40 MG tablet TAKE 1 TABLET BY MOUTH EVERY DAY 11/7/2019 at Unknown time Yes Aden Lopez MD   Cholecalciferol (VITAMIN D3 PO) Take 1,000 Units by mouth daily  11/7/2019 at Unknown time Yes Reported, Patient   CIALIS 5 MG tablet TAKE 1 TABLET BY MOUTH EVERY DAY AS NEEDED  Yes Aden Lopez MD   ciprofloxacin (CIPRO) 500 MG  tablet Take 1 tablet (500 mg) by mouth 2 times daily 11/7/2019 at am Yes Erwin Bach MD   gabapentin (NEURONTIN) 100 MG capsule Take 300 mg by mouth 3 times daily 11/7/2019 at x2 Yes Unknown, Entered By History   insulin degludec (TRESIBA FLEXTOUCH) 100 UNIT/ML pen Inject 55 Units Subcutaneous daily 11/7/2019 at am Yes Jasmin Arteaga APRN CNP   insulin lispro (HUMALOG PEN) 100 UNIT/ML (1 unit dial) pen 1 units per 9 grams of carbohydrate before each meal + 1:16 over 150 correction.  TDD 70 units 11/7/2019 at 1100 Yes Jasmin Arteaga APRN CNP   ketoconazole (NIZORAL) 2 % external shampoo APPLY EXTERNALLY 2 TIMES A WEEK AS DIRECTED 11/7/2019 at Unknown time Yes Aden Lopez MD   ketoconazole (NIZORAL) 2 % external shampoo Apply topically daily as needed , apply daily and wash off after 5 min  Yes Unknown, Entered By History   lisinopril (PRINIVIL/ZESTRIL) 40 MG tablet Take 1 tablet (40 mg) by mouth daily 11/7/2019 at Unknown time Yes Aden Lopez MD   MAGNESIUM GLYCINATE PLUS PO Take 400 mg by mouth 2 times daily 11/7/2019 at Unknown time Yes Reported, Patient   metFORMIN (GLUCOPHAGE-XR) 500 MG 24 hr tablet TAKE 2 TABLETS BY MOUTH TWICE DAILY WITH MEALS 11/6/2019 at Unknown time Yes Jasmin Arteaga APRN CNP   Multiple Vitamins-Minerals (MULTIVITAMIN PO) Take 1 tablet by mouth daily  11/7/2019 at Unknown time Yes Reported, Patient   Omega-3 Fatty Acids (OMEGA-3 FISH OIL PO) Take 1 g by mouth 2 times daily (with meals)  11/7/2019 at Unknown time Yes Reported, Patient   semaglutide (OZEMPIC, 1 MG/DOSE,) 2 MG/1.5ML pen Inject 1 mg Subcutaneous every 7 days , on Wednesdays 10/30/2019 at Unknown time Yes Jasmin Arteaga APRN CNP   blood glucose (NO BRAND SPECIFIED) lancets standard Use to test blood sugar 3 times daily or as directed.   Jasmin Arteaga APRN CNP   blood glucose monitoring (NO BRAND SPECIFIED) meter device kit Use to test blood sugar 3 times daily or as  directed.   Aden Lopez MD   blood glucose monitoring (NO BRAND SPECIFIED) test strip Use to test blood sugars 3 times daily or as directed   Jasmin Arteaga APRN CNP   Co-Enzyme Q10 100 MG CAPS Take 100 mg by mouth daily    Aden Lopez MD   Continuous Blood Gluc Sensor (FREESTYLE LUCERO 14 DAY SENSOR) MISC 1 each every 14 days   Jasmin Arteaga APRN CNP   Insulin Pen Needle (PEN NEEDLES) 31G X 5 MM MISC 1 Device 5 times daily , TWICE DAILY WITH LANTUS AND 3 TIMES A DAY PRN WITH NOVOLOG FLEXPEN   Jasmin Arteaga APRN CNP

## 2019-11-08 NOTE — ANESTHESIA PREPROCEDURE EVALUATION
Anesthesia Pre-Procedure Evaluation    Patient: Crispin Rojas   MRN: [id number] : 1960          Preoperative Diagnosis: Osteomyelitis of second toe of left foot (H) [M86.9]    Procedure(s):  Left 2nd toe amputation    Past Medical History:   Diagnosis Date     Cerebral infarction (H)      Chronic infection      H/O heartburn      History of heartburn      Hypertension      Numbness and tingling      Obesity      GILA (obstructive sleep apnea) 10/22/2018     Renal stone      Type II or unspecified type diabetes mellitus without mention of complication, not stated as uncontrolled      Past Surgical History:   Procedure Laterality Date     AMPUTATE FOOT Left 2016    Procedure: AMPUTATE FOOT;  Surgeon: Jack Younger DPM;  Location: [facility] OR     AMPUTATE TOE(S) Right 2016    Procedure: AMPUTATE TOE(S);  Surgeon: Rachelle Manriquez DPM, Pod;  Location: [facility] OR     AMPUTATE TOE(S) Right 2019    Procedure: Right fourth toe partial amputation for treatment of osteomyelitis.;  Surgeon: Jack Younger DPM;  Location: [facility] OR     ANGIOGRAM       COLONOSCOPY       COMBINED CYSTOSCOPY, RETROGRADES, URETEROSCOPY, INSERT STENT Left 2016    Procedure: COMBINED CYSTOSCOPY, RETROGRADES, URETEROSCOPY, INSERT STENT;  Surgeon: Artemio Valenzuela MD;  Location: [facility] OR     COMBINED CYSTOSCOPY, RETROGRADES, URETEROSCOPY, LASER HOLMIUM LITHOTRIPSY URETER(S), INSERT STENT Left 10/3/2016    Procedure: COMBINED CYSTOSCOPY, RETROGRADES, URETEROSCOPY, LASER HOLMIUM LITHOTRIPSY URETER(S), INSERT STENT;  Surgeon: Artemio Valenzuela MD;  Location: [facility] OR     EXTRACORPOREAL SHOCK WAVE LITHOTRIPSY (ESWL) Left 2016    Procedure: EXTRACORPOREAL SHOCK WAVE LITHOTRIPSY (ESWL);  Surgeon: Artemio Valenzuela MD;  Location: [facility]     RECESSION GASTROCNEMIUS Right 2016    Procedure: RECESSION GASTROCNEMIUS;  Surgeon: Rachelle Manriquez DPM, Pod;  Location:  OR     Anesthesia Evaluation     .              ROS/MED HX    ENT/Pulmonary:     (+)sleep apnea, uses CPAP , . .    Neurologic:     (+)CVA     Cardiovascular:     (+) Dyslipidemia, hypertension----. : . . . :. . Previous cardiac testing Echodate:3/20/19results:Mild aortic root dilatation noted. Sinuses are 4.2 cm and ascending aorta is  4.4 cm.  Left ventricular systolic function is normal. The visual ejection fraction is  estimated at 55-60%. There is borderline concentric left ventricular  hypertrophy.  There is mild biatrial enlargement.  The right ventricle is not well visualized but is grossly normal size.  There is no atrial shunt seen. A contrast injection (Bubble Study) was  performed that was negative for flow across the interatrial septum.  Contrast was used without apparent complications. The 2010 echo was a stress  study and the aorta was not adequately imaged for comparison.date: results: date: results: date: results:         (-) syncope   METS/Exercise Tolerance:     Hematologic:     (+) Anemia ( 12.3), -      Musculoskeletal:         GI/Hepatic:  - neg GI/hepatic ROS      (-) GERD   Renal/Genitourinary:     (+) Nephrolithiasis ,    (-) renal disease   Endo:     (+) type II DM Using insulin Diabetic complications: nephropathy neuropathy, Obesity, .   (-) Type I DM   Psychiatric:         Infectious Disease:   (+) Other Infectious Disease (right 2nd toe osteo)       Malignancy:         Other:                          Physical Exam  Normal systems: cardiovascular, pulmonary and dental    Airway   Mallampati: II  TM distance: >3 FB  Neck ROM: full    Dental     Cardiovascular       Pulmonary             Lab Results   Component Value Date    WBC 5.0 11/08/2019    HGB 12.3 (L) 11/08/2019    HCT 38.6 (L) 11/08/2019     11/08/2019    CRP <2.9 11/07/2019    SED 28 (H) 11/07/2019     11/08/2019    POTASSIUM 4.3 11/08/2019    CHLORIDE 109 11/08/2019    CO2 26 11/08/2019    BUN 18 11/08/2019    CR 0.90 11/08/2019     (H) 11/08/2019  "   NARA 8.7 11/08/2019    MAG 2.1 08/02/2019    ALBUMIN 3.6 11/07/2019    PROTTOTAL 7.5 11/07/2019    ALT 42 11/07/2019    AST 27 11/07/2019    ALKPHOS 48 11/07/2019    BILITOTAL 0.4 11/07/2019    LIPASE 220 07/07/2019    INR 1.08 11/07/2019    TSH 1.78 12/27/2017       Preop Vitals  BP Readings from Last 3 Encounters:   11/08/19 137/77   11/07/19 129/70   10/31/19 120/68    Pulse Readings from Last 3 Encounters:   11/08/19 55   11/07/19 66   10/31/19 66      Resp Readings from Last 3 Encounters:   11/08/19 20   10/31/19 18   10/24/19 14    SpO2 Readings from Last 3 Encounters:   11/08/19 96%   10/31/19 97%   10/21/19 96%      Temp Readings from Last 1 Encounters:   11/08/19 98.4  F (36.9  C) (Temporal)    Ht Readings from Last 1 Encounters:   11/08/19 1.803 m (5' 10.98\")      Wt Readings from Last 1 Encounters:   11/07/19 108 kg (238 lb)    Estimated body mass index is 33.21 kg/m  as calculated from the following:    Height as of this encounter: 1.803 m (5' 10.98\").    Weight as of this encounter: 108 kg (238 lb).       Anesthesia Plan      History & Physical Review  History and physical reviewed and following examination; no interval change.    ASA Status:  3 .    NPO Status:  > 8 hours    Plan for MAC with Intravenous induction. Maintenance will be TIVA.  Reason for MAC:  Chronic cardiopulmonary disease (G9) and Deep or markedly invasive procedure (G8)  PONV prophylaxis:  Ondansetron (or other 5HT-3) and Dexamethasone or Solumedrol       Postoperative Care  Postoperative pain management:  IV analgesics.      Consents  Anesthetic plan, risks, benefits and alternatives discussed with:  Patient..                 Jesse Nogueira MD                    .  "

## 2019-11-08 NOTE — PLAN OF CARE
Ambulatory Status:  Pt up Ind.  VS:  stable; afebrile.  Pain:  denies  Resp: LS clear on RA  GI:  denies nausea.  NPO diet for surgery at 1700.  BS active.  Passing flatus.  Last BM 11/7.  :  voiding.  Skin:  Left foot second toe red.  Tx:  vanco; zosyn  Labs:  creat 0.90. ; 192.  Consults:  podiatry  Disposition:  TBD

## 2019-11-08 NOTE — PROGRESS NOTES
[facility]    Hospitalist Progress Note      Assessment & Plan   Crispin Rojas is a 59 year old male with a history of insulin-dependent type 2 diabetes, hypertension, history of osteomyelitis requiring toe amputation, who is admitted to the hospitalist service for management of osteomyelitis.     Osteomyelitis of left second toe  Patient had chronic non-healing wound of left second toe. Presented on 10/31 with redness/cellulitis in that area. MRI on 10/31 confirmed osteomyelitis of that toe. Seen in podiatry clinic on day of admission who recommended admission to the hospital for IV antibiotics prior to surgery. No signs of systemic illness. CRP <2.9, WBC 5.  - blood cultures are no growth to date  - Vancomycin and Zosyn empirically per podiatry.   - NPO for surgery this afternoon  - Consider ID consult depending upon results of surgery  - IVF initiated this morning since surgery not until late this afternoon    Type II DM  Well controlled at baseline. A1c 5.9%.   Home insulin is degludec 55 units daily, 1 unit:9 g carbs with meals, low dose sliding scale insulin. Also takes Semaglutide injection weekly and metfomin.   - will order degludec 10 units x 1 today, then change sliding scale insulin to q4hrs  - likely resume PTA degludec dose tomorrow and carb counting insulin when oral intake is resumed      HTN: Continue amlodipine. Hold lisinopril before surgery.  Neuropathy: continue gabapentin.  HLD: Continue atorvastatin.         DVT Prophylaxis: Pneumatic Compression Devices  Code Status: Full Code    Disposition: Expected discharge pending surgery and postop course    Anthony Bowles MD  Text Page  (7am to 6pm)    Interval History   Denies pain. Denies shortness of breath or abdominal pain.   Patient reports he does not tolerate high blood glucose well and even reaching to 170s makes him not feel well.     -Data reviewed today: I reviewed all new labs and imaging results over the last 24  hours. I personally reviewed no images or EKG's today.    Physical Exam   Temp: 96.5  F (35.8  C) Temp src: Oral BP: (!) 148/66 Pulse: 64   Resp: 16 SpO2: 98 % O2 Device: None (Room air)    Vitals:    11/07/19 1734   Weight: 108 kg (238 lb)     Vital Signs with Ranges  Temp:  [96.5  F (35.8  C)-96.8  F (36  C)] 96.5  F (35.8  C)  Pulse:  [64-68] 64  Resp:  [16-18] 16  BP: (118-148)/(56-70) 148/66  SpO2:  [93 %-98 %] 98 %  I/O last 3 completed shifts:  In: 240 [P.O.:240]  Out: -     Constitutional: Alert, awake and no apparent distress  Respiratory: Clear to auscultation bilaterally, no wheezing  Cardiovascular: regular rate and rhythm  GI: soft and non-tender  Ext: left great toe prior amputation, left second toe with band aid covering ulceration      Medications     sodium chloride         amLODIPine  5 mg Oral Daily     atorvastatin  40 mg Oral Daily     co-enzyme Q-10  100 mg Oral Daily     gabapentin  300 mg Oral TID     insulin aspart  1-6 Units Subcutaneous Q4H     insulin degludec  10 Units Subcutaneous Once     multivitamin w/minerals  1 tablet Oral Daily     piperacillin-tazobactam  3.375 g Intravenous Q6H     sodium chloride (PF)  3 mL Intracatheter Q8H     vancomycin (VANCOCIN) IV  1,500 mg Intravenous Q8H       Data   Recent Labs   Lab 11/08/19  0816 11/07/19  1817   WBC 5.0 5.4   HGB 12.3* 11.5*   MCV 90 90    168   INR  --  1.08    138   POTASSIUM 4.3 4.3   CHLORIDE 109 108   CO2 26 25   BUN 18 25   CR 0.90 0.89   ANIONGAP 5 5   NARA 8.7 8.9   * 110*   ALBUMIN  --  3.6   PROTTOTAL  --  7.5   BILITOTAL  --  0.4   ALKPHOS  --  48   ALT  --  42   AST  --  27       No results found for this or any previous visit (from the past 24 hour(s)).

## 2019-11-08 NOTE — PLAN OF CARE
A&O  VSS  Up independently  IV infusing  Tolerating a regular diet, NPO @ midnight  LS clear  BS active, +gas. LBM 11/07/19  Skin: blanchable redness on tops of a few toes including L 2nd toe  Dry feet. Bilateral LE edema +1 on right, +2 on left  No c/o pain or nausea   & 165  Tx: Ruthy and Zosyn  Plan: surgery for L 2nd toe amputation tomorrow with Dr Manriquez

## 2019-11-09 LAB
ANION GAP SERPL CALCULATED.3IONS-SCNC: 8 MMOL/L (ref 3–14)
BUN SERPL-MCNC: 20 MG/DL (ref 7–30)
CALCIUM SERPL-MCNC: 8.5 MG/DL (ref 8.5–10.1)
CHLORIDE SERPL-SCNC: 110 MMOL/L (ref 94–109)
CO2 SERPL-SCNC: 23 MMOL/L (ref 20–32)
CREAT SERPL-MCNC: 0.93 MG/DL (ref 0.66–1.25)
GFR SERPL CREATININE-BSD FRML MDRD: >90 ML/MIN/{1.73_M2}
GLUCOSE BLDC GLUCOMTR-MCNC: 145 MG/DL (ref 70–99)
GLUCOSE BLDC GLUCOMTR-MCNC: 152 MG/DL (ref 70–99)
GLUCOSE BLDC GLUCOMTR-MCNC: 164 MG/DL (ref 70–99)
GLUCOSE BLDC GLUCOMTR-MCNC: 167 MG/DL (ref 70–99)
GLUCOSE BLDC GLUCOMTR-MCNC: 178 MG/DL (ref 70–99)
GLUCOSE BLDC GLUCOMTR-MCNC: 218 MG/DL (ref 70–99)
GLUCOSE BLDC GLUCOMTR-MCNC: 222 MG/DL (ref 70–99)
GLUCOSE BLDC GLUCOMTR-MCNC: 252 MG/DL (ref 70–99)
GLUCOSE BLDC GLUCOMTR-MCNC: 72 MG/DL (ref 70–99)
GLUCOSE BLDC GLUCOMTR-MCNC: 98 MG/DL (ref 70–99)
GLUCOSE SERPL-MCNC: 155 MG/DL (ref 70–99)
POTASSIUM SERPL-SCNC: 4.3 MMOL/L (ref 3.4–5.3)
SODIUM SERPL-SCNC: 141 MMOL/L (ref 133–144)

## 2019-11-09 PROCEDURE — 25000128 H RX IP 250 OP 636: Performed by: PODIATRIST

## 2019-11-09 PROCEDURE — 25000132 ZZH RX MED GY IP 250 OP 250 PS 637: Performed by: PODIATRIST

## 2019-11-09 PROCEDURE — 36415 COLL VENOUS BLD VENIPUNCTURE: CPT | Performed by: PODIATRIST

## 2019-11-09 PROCEDURE — 25800030 ZZH RX IP 258 OP 636: Performed by: PODIATRIST

## 2019-11-09 PROCEDURE — 80048 BASIC METABOLIC PNL TOTAL CA: CPT | Performed by: PODIATRIST

## 2019-11-09 PROCEDURE — 12000000 ZZH R&B MED SURG/OB

## 2019-11-09 PROCEDURE — 25000132 ZZH RX MED GY IP 250 OP 250 PS 637: Performed by: INTERNAL MEDICINE

## 2019-11-09 PROCEDURE — 99232 SBSQ HOSP IP/OBS MODERATE 35: CPT | Performed by: INTERNAL MEDICINE

## 2019-11-09 PROCEDURE — 00000146 ZZHCL STATISTIC GLUCOSE BY METER IP

## 2019-11-09 PROCEDURE — L4360 PNEUMAT WALKING BOOT PRE CST: HCPCS

## 2019-11-09 RX ORDER — METFORMIN HCL 500 MG
1000 TABLET, EXTENDED RELEASE 24 HR ORAL 2 TIMES DAILY WITH MEALS
Status: DISCONTINUED | OUTPATIENT
Start: 2019-11-09 | End: 2019-11-10 | Stop reason: HOSPADM

## 2019-11-09 RX ADMIN — TAZOBACTAM SODIUM AND PIPERACILLIN SODIUM 3.38 G: 375; 3 INJECTION, SOLUTION INTRAVENOUS at 20:39

## 2019-11-09 RX ADMIN — VANCOMYCIN HYDROCHLORIDE 1500 MG: 10 INJECTION, POWDER, LYOPHILIZED, FOR SOLUTION INTRAVENOUS at 06:30

## 2019-11-09 RX ADMIN — TAZOBACTAM SODIUM AND PIPERACILLIN SODIUM 3.38 G: 375; 3 INJECTION, SOLUTION INTRAVENOUS at 02:41

## 2019-11-09 RX ADMIN — AMLODIPINE BESYLATE 5 MG: 5 TABLET ORAL at 08:43

## 2019-11-09 RX ADMIN — TAZOBACTAM SODIUM AND PIPERACILLIN SODIUM 3.38 G: 375; 3 INJECTION, SOLUTION INTRAVENOUS at 14:17

## 2019-11-09 RX ADMIN — VANCOMYCIN HYDROCHLORIDE 1500 MG: 10 INJECTION, POWDER, LYOPHILIZED, FOR SOLUTION INTRAVENOUS at 14:53

## 2019-11-09 RX ADMIN — MULTIPLE VITAMINS W/ MINERALS TAB 1 TABLET: TAB at 08:42

## 2019-11-09 RX ADMIN — METFORMIN HYDROCHLORIDE 1000 MG: 500 TABLET, EXTENDED RELEASE ORAL at 18:30

## 2019-11-09 RX ADMIN — GABAPENTIN 300 MG: 300 CAPSULE ORAL at 22:18

## 2019-11-09 RX ADMIN — GABAPENTIN 300 MG: 300 CAPSULE ORAL at 08:42

## 2019-11-09 RX ADMIN — GABAPENTIN 300 MG: 300 CAPSULE ORAL at 16:22

## 2019-11-09 RX ADMIN — Medication 100 MG: at 08:42

## 2019-11-09 RX ADMIN — ATORVASTATIN CALCIUM 40 MG: 40 TABLET, FILM COATED ORAL at 08:43

## 2019-11-09 RX ADMIN — TAZOBACTAM SODIUM AND PIPERACILLIN SODIUM 3.38 G: 375; 3 INJECTION, SOLUTION INTRAVENOUS at 09:01

## 2019-11-09 RX ADMIN — VANCOMYCIN HYDROCHLORIDE 1500 MG: 10 INJECTION, POWDER, LYOPHILIZED, FOR SOLUTION INTRAVENOUS at 22:17

## 2019-11-09 ASSESSMENT — ACTIVITIES OF DAILY LIVING (ADL)
ADLS_ACUITY_SCORE: 11

## 2019-11-09 NOTE — ANESTHESIA CARE TRANSFER NOTE
Patient: Crispin Rojas    Procedure(s):  Left 2nd toe amputation    Diagnosis: Osteomyelitis of second toe of left foot (H) [M86.9]  Diagnosis Additional Information: No value filed.    Anesthesia Type:   MAC     Note:  Airway :Room Air  Patient transferred to:PACU  Comments: Report and signed off to RN in PACU.  Good Resps, skin pink, VSS.      Vitals: (Last set prior to Anesthesia Care Transfer)    CRNA VITALS  11/8/2019 1747 - 11/8/2019 1822      11/8/2019             Pulse:  58    SpO2:  100 %                Electronically Signed By: SARAH Pichardo CRNA  November 8, 2019  6:22 PM

## 2019-11-09 NOTE — PHARMACY-VANCOMYCIN DOSING SERVICE
Pharmacy Vancomycin Note  Date of Service 2019  Patient's  1960   59 year old, male    Indication: Osteomyelitis  Goal Trough Level: 15-20 mg/L  Day of Therapy: 2  Current Vancomycin regimen:  1500 mg IV q8h    Current estimated CrCl = Estimated Creatinine Clearance: 113.2 mL/min (based on SCr of 0.9 mg/dL).    Creatinine for last 3 days  2019:  6:17 PM Creatinine 0.89 mg/dL  2019:  8:16 AM Creatinine 0.90 mg/dL    Recent Vancomycin Levels (past 3 days)  2019: 10:14 PM Vancomycin Level 18.2 mg/L    Vancomycin IV Administrations (past 72 hours)                   vancomycin 1500 mg in 0.9% NaCl 250 ml intermittent infusion 1,500 mg (mg) 1,500 mg Given 19 2302    vancomycin 1500 mg in 0.9% NaCl 250 ml intermittent infusion 1,500 mg (mg) 1,500 mg Given 19 1538     1,500 mg Given  825     1,500 mg Given 19 2207                Nephrotoxins and other renal medications (From now, onward)    Start     Dose/Rate Route Frequency Ordered Stop    19 2330  vancomycin 1500 mg in 0.9% NaCl 250 ml intermittent infusion 1,500 mg      1,500 mg  over 90 Minutes Intravenous EVERY 8 HOURS 19 1723      19 1730  piperacillin-tazobactam (ZOSYN) infusion 3.375 g     Note to Pharmacy:  Pharmacy can adjust dose based on renal function.    3.375 g  100 mL/hr over 30 Minutes Intravenous EVERY 6 HOURS 19 1723               Contrast Orders - past 72 hours (72h ago, onward)    None          Interpretation of levels and current regimen:  Trough level is  Therapeutic    Has serum creatinine changed > 50% in last 72 hours: No      Renal Function: Stable    Plan:  1.  Continue Current Dose  2.  Pharmacy will check trough levels as appropriate in 1-3 Days.    3. Serum creatinine levels will be ordered daily for the first week of therapy and at least twice weekly for subsequent weeks.      Nathan Hogan RPH        .

## 2019-11-09 NOTE — PLAN OF CARE
Ambulatory Status:  Pt up SBA.  VS:  stable; afebrile  Pain:  3/10 in Left foot; declines meds. Elevated Left foot on pillow.  Resp: LS clear on RA  GI:  denies nausea.  regular  diet.  BS active.  Passing flatus.  Last BM 11/9.  :  voiding.  Skin:  Left foot dressing changed by surgeon.  Tx:  zosyn/vanco  Labs:  ; 252; 179; 152.  Consults:  podiatry/PT.  Disposition:  possible discharge tomorrow

## 2019-11-09 NOTE — PLAN OF CARE
RN - Pt returning from PACU at approximately 1910. Hypertensive - otherwise vitally stable. Pt c/o mild pain in left foot - denying need for pain medications. Left foot ace wrapped - no drainage noted. Diet advanced to regular per his previous diet order. Continues IV antibiotics. Anticipating PT eval and orthotics consults tomorrow.

## 2019-11-09 NOTE — OP NOTE
Procedure Date: 11/08/2019     SURGEON: Rachelle Manriquez DPM     PREOPERATIVE DIAGNOSES:   1.  Diabetic with neuropathy.   2.  Ulceration, left second toe.   3.  Osteomyelitis, left second toe.   4.  Previous left great toe amputation.      POSTOPERATIVE DIAGNOSES:   1.  Diabetic with neuropathy.   2.  Ulceration, left second toe.   3.  Osteomyelitis, left second toe.     4.  Previous left great toe amputation.      PROCEDURES:  Left second toe amputation at the metatarsophalangeal joint.      ANESTHESIA:  MAC with local.      HEMOSTASIS:  Electrocautery.      ESTIMATED BLOOD LOSS:  10 mL.      SPECIMENS:  Left second toe for pathology.      MATERIALS:  None.      INDICATIONS:  Mr. Rojas is a 59-year-old diabetic male that was admitted to the hospital with worsening redness to the left second toe.  He has had a wound distally for quite a while and in the last few days has become more red.  An MRI was obtained, which showed osteomyelitis in the distal and middle phalanx of the left second toe.  It was discussed with patient to go in and remove the toe to get rid of the infected bone and try to get the infection under control to prevent partial loss of foot or leg.  Risks, benefits and complications were discussed with the patient.  No guarantees were made.  He wishes to proceed with surgery.      PROCEDURE:  The patient was brought to the operating room, placed on the operating table in a supine position.  Anesthesia was administered and local was injected.  The foot was prepped and draped using sterile technique.  Attention was directed to the distal aspect of the left second toe.  A fishmouth incision was made around the base of the left second toe full thickness down to bone.  The toe was then disarticulated at the metatarsophalangeal joint.  All bleeding was controlled with electrocautery.  The wound was flushed with copious amounts of antibiotic solution.  The toe was sent for pathology.  The skin was  reapproximated using 4-0 Prolene.  The patient's foot was placed in a dry sterile dressing.  The patient tolerated the procedure and anesthesia well and was transferred to recovery with vital signs stable and vascular status intact.         JASEN GARCIA DPM             D: 2019   T: 2019   MT: RAFAL      Name:     CEDRIC LAW   MRN:      [id number]        Account:        [id number]   :      1960           Procedure Date: 2019      Document: [id number]

## 2019-11-09 NOTE — PROGRESS NOTES
S.  Patient was seen today at [facility] for an evaluation for a cam walker for their Left  foot/ankle as ordered.  O/G. help stabilize foot and ankle.  A.  Patient was fit with a large short Air Cast pneumatic Cam Walker for the Left leg.   Donning and doffing of the Cam Walker was explained.  P.   Patient was advised to contact this office with any problems or questions.  CRISTINA Heaton.

## 2019-11-09 NOTE — PROGRESS NOTES
[facility]    Medicine Progress Note - Hospitalist Service       Date of Admission:  11/7/2019  Assessment & Plan       Crispin Rojas is a 59 year old male with a history of insulin-dependent type 2 diabetes, hypertension, history of osteomyelitis requiring toe amputation, who is admitted to the hospitalist service for management of osteomyelitis.     Osteomyelitis of left second toe  Patient had chronic non-healing wound of left second toe. Presented on 10/31 with redness/cellulitis in that area. MRI on 10/31 confirmed osteomyelitis of that toe. Seen in podiatry clinic on day of admission who recommended admission to the hospital for IV antibiotics prior to surgery. No signs of systemic illness. CRP <2.9, WBC 5.  - blood cultures are no growth to date  - Vancomycin and Zosyn empirically per podiatry.   - had surgery yesterday - left second toe amputation   - Consider ID consult depending upon results of surgery, cultures        Type II DM  Well controlled at baseline. A1c 5.9%.   Home insulin is degludec 55 units daily, 1 unit:9 g carbs with meals, low dose sliding scale insulin. Also takes Semaglutide injection weekly and metfomin.   - continue treatment      HTN: Continue amlodipine and lisinopril    Neuropathy: continue gabapentin.    HLD: Continue atorvastatin.         DVT Prophylaxis: Pneumatic Compression Devices  Code Status: Full Code        Disposition Plan   Expected discharge: Tomorrow, recommended to prior living arrangement once adequate pain management/ tolerating PO medications and antibiotic plan established.  Entered: Jeffry Bunch MD 11/09/2019, 9:51 AM       The patient's care was discussed with the Patient.    Jeffry Bunch MD  Hospitalist Service  [facility]    ______________________________________________________________________    Interval History   Feels well after surgery yesterday. No significant pain. Afebrile. No diarrhea, eating.     Data  reviewed today: I reviewed all medications, new labs and imaging results over the last 24 hours.   Physical Exam   Vital Signs: Temp: 96.7  F (35.9  C) Temp src: Oral BP: 116/51 Pulse: 67 Heart Rate: 54 Resp: 19 SpO2: 93 % O2 Device: None (Room air) Oxygen Delivery: 1 LPM  Weight: 238 lbs 0 oz  Constitutional: awake, alert, cooperative, no apparent distress, and appears stated age  Respiratory: No increased work of breathing, good air exchange, clear to auscultation bilaterally, no crackles or wheezing  Cardiovascular: Normal apical impulse, regular rate and rhythm, normal S1 and S2, no S3 or S4, and no murmur noted  GI: obese, normal bowel sounds, soft, non-distended, non-tender, no masses palpated, no hepatosplenomegally  Skin: normal skin color, texture, turgor  Musculoskeletal: There is no redness, warmth, or swelling of the joints.  Full range of motion noted.   Left foot post surgery dressing      Data   Recent Labs   Lab 11/09/19  0752 11/08/19  0816 11/07/19  1817   WBC  --  5.0 5.4   HGB  --  12.3* 11.5*   MCV  --  90 90   PLT  --  155 168   INR  --   --  1.08    140 138   POTASSIUM 4.3 4.3 4.3   CHLORIDE 110* 109 108   CO2 23 26 25   BUN 20 18 25   CR 0.93 0.90 0.89   ANIONGAP 8 5 5   NARA 8.5 8.7 8.9   * 191* 110*   ALBUMIN  --   --  3.6   PROTTOTAL  --   --  7.5   BILITOTAL  --   --  0.4   ALKPHOS  --   --  48   ALT  --   --  42   AST  --   --  27     Recent Results (from the past 24 hour(s))   X-ray lt Foot 3 vw port: In PACU    Narrative    EXAM: XR FOOT PORT LT 3 VW  LOCATION: [facility]  DATE/TIME: 11/8/2019 6:40 PM    INDICATION: Postoperative.  COMPARISON: None.      Impression    IMPRESSION: Amputations of the first and second toes at the MTP joints. No opaque foreign body. Hammertoe deformities. No acute fracture.

## 2019-11-09 NOTE — PLAN OF CARE
"POD 0 from 2nd toe amputation  Currently on 1L NC, capnography in place  Up independently, voided x 1, BM this shift   PIV saline locked, continues IV Zosyn and Vanco   BG check was 222 around 0130, pt requested NovoLog, explained to pt that NovoLog was not ordered for that time and pt proceed to take 10 units of his own insulin against RN advising him not to. Pt became irritated and states that when he is here \"we miss up his BG and do not give him the right amount of insulin\".  "

## 2019-11-09 NOTE — PROGRESS NOTES
"Podiatry / Foot and Ankle Surgery Progress Note    November 9, 2019    Subject: Patient was seen at bedside.  Notes some discomfort to the foot but not really painful. Notes he did get some sleep last night.     Objective:  Vitals: /62   Pulse 67   Temp 96.7  F (35.9  C) (Oral)   Resp 19   Ht 1.803 m (5' 10.98\")   Wt 108 kg (238 lb)   SpO2 91%   BMI 33.21 kg/m    BMI= Body mass index is 33.21 kg/m .     WBC   Date Value Ref Range Status   11/08/2019 5.0 4.0 - 11.0 10e9/L Final     A1C: 5.9 (10/2019)    General:  Patient is alert and orientated.  NAD  Dressing is c/d/i. Sutures intact. Skin is well approximated. No redness, dehiscence or signs of infection.     Imaging: left foot xray : I personally reviewed images. Amputations of the first and second toes at the MTP joints. No opaque foreign body. Hammertoe deformities. No acute fracture.    Assessment: 59 yr old diabetic male with neuropathy, s/p left 2nd toe amputation due to osteomyelitis.     Plan:    -POD#1  -dressing changed.   -Keep foot and dressing dry.   -minimal heel weight bearing in post op boot.   -all bone infection removed. No further surgery planned.   -Patient okay to discharge from podiatry standpoint.   -Can go home on oral antibiotics, Keflex 500 bid for 10 days.   -Follow up with Dr. Manriquez in 1.5 weeks from discharge from hospital for dressing change.   -discharge recs placed.   -Podiatry will sign off. Please call with questions or concerns.     Rachelle Manriquez DPM, Podiatry/Foot and Ankle Surgery  7:39 AM    "

## 2019-11-09 NOTE — BRIEF OP NOTE
[facility]    Brief Operative Note    Pre-operative diagnosis: Osteomyelitis of second toe of left foot (H) [M86.9]  Post-operative diagnosis Same as pre-operative diagnosis    Procedure: Procedure(s):  Left 2nd toe amputation  Surgeon: Surgeon(s) and Role:     * Rachelle Manriquez DPM, Podiatry/Foot and Ankle Surgery - Primary  Anesthesia: General   Estimated blood loss: Less than 10 ml  Drains: None  Specimens:   ID Type Source Tests Collected by Time Destination   A : Left 2nd toe Tissue Toe SURGICAL PATHOLOGY EXAM Rachelle Manriquez DPM, Podiatry/Foot and Ankle Surgery 11/8/2019  6:07 PM      Findings:   None.  Complications: None.  Implants: * No implants in log *

## 2019-11-10 ENCOUNTER — APPOINTMENT (OUTPATIENT)
Dept: PHYSICAL THERAPY | Facility: CLINIC | Age: 59
End: 2019-11-10
Attending: PODIATRIST
Payer: COMMERCIAL

## 2019-11-10 VITALS
WEIGHT: 238 LBS | OXYGEN SATURATION: 94 % | BODY MASS INDEX: 33.32 KG/M2 | SYSTOLIC BLOOD PRESSURE: 143 MMHG | TEMPERATURE: 97 F | HEART RATE: 50 BPM | RESPIRATION RATE: 16 BRPM | DIASTOLIC BLOOD PRESSURE: 63 MMHG | HEIGHT: 71 IN

## 2019-11-10 LAB
ANION GAP SERPL CALCULATED.3IONS-SCNC: 5 MMOL/L (ref 3–14)
BUN SERPL-MCNC: 18 MG/DL (ref 7–30)
CALCIUM SERPL-MCNC: 8.5 MG/DL (ref 8.5–10.1)
CHLORIDE SERPL-SCNC: 111 MMOL/L (ref 94–109)
CO2 SERPL-SCNC: 25 MMOL/L (ref 20–32)
CREAT SERPL-MCNC: 0.97 MG/DL (ref 0.66–1.25)
ERYTHROCYTE [DISTWIDTH] IN BLOOD BY AUTOMATED COUNT: 13.5 % (ref 10–15)
GFR SERPL CREATININE-BSD FRML MDRD: 86 ML/MIN/{1.73_M2}
GLUCOSE BLDC GLUCOMTR-MCNC: 115 MG/DL (ref 70–99)
GLUCOSE BLDC GLUCOMTR-MCNC: 143 MG/DL (ref 70–99)
GLUCOSE BLDC GLUCOMTR-MCNC: 157 MG/DL (ref 70–99)
GLUCOSE SERPL-MCNC: 134 MG/DL (ref 70–99)
HCT VFR BLD AUTO: 38.5 % (ref 40–53)
HGB BLD-MCNC: 12 G/DL (ref 13.3–17.7)
MCH RBC QN AUTO: 28.1 PG (ref 26.5–33)
MCHC RBC AUTO-ENTMCNC: 31.2 G/DL (ref 31.5–36.5)
MCV RBC AUTO: 90 FL (ref 78–100)
PLATELET # BLD AUTO: 142 10E9/L (ref 150–450)
POTASSIUM SERPL-SCNC: 4 MMOL/L (ref 3.4–5.3)
RBC # BLD AUTO: 4.27 10E12/L (ref 4.4–5.9)
SODIUM SERPL-SCNC: 141 MMOL/L (ref 133–144)
WBC # BLD AUTO: 5.4 10E9/L (ref 4–11)

## 2019-11-10 PROCEDURE — 25000132 ZZH RX MED GY IP 250 OP 250 PS 637: Performed by: PODIATRIST

## 2019-11-10 PROCEDURE — 00000146 ZZHCL STATISTIC GLUCOSE BY METER IP

## 2019-11-10 PROCEDURE — 80048 BASIC METABOLIC PNL TOTAL CA: CPT | Performed by: INTERNAL MEDICINE

## 2019-11-10 PROCEDURE — 25000128 H RX IP 250 OP 636: Performed by: PODIATRIST

## 2019-11-10 PROCEDURE — 97161 PT EVAL LOW COMPLEX 20 MIN: CPT | Mod: GP | Performed by: PHYSICAL THERAPIST

## 2019-11-10 PROCEDURE — 36415 COLL VENOUS BLD VENIPUNCTURE: CPT | Performed by: INTERNAL MEDICINE

## 2019-11-10 PROCEDURE — 85027 COMPLETE CBC AUTOMATED: CPT | Performed by: INTERNAL MEDICINE

## 2019-11-10 PROCEDURE — 99238 HOSP IP/OBS DSCHRG MGMT 30/<: CPT | Performed by: INTERNAL MEDICINE

## 2019-11-10 PROCEDURE — 25000132 ZZH RX MED GY IP 250 OP 250 PS 637: Performed by: INTERNAL MEDICINE

## 2019-11-10 PROCEDURE — 25800030 ZZH RX IP 258 OP 636: Performed by: PODIATRIST

## 2019-11-10 RX ORDER — CEPHALEXIN 500 MG/1
500 CAPSULE ORAL 2 TIMES DAILY
Qty: 20 CAPSULE | Refills: 0 | Status: SHIPPED | OUTPATIENT
Start: 2019-11-10 | End: 2019-11-20

## 2019-11-10 RX ADMIN — Medication 100 MG: at 08:25

## 2019-11-10 RX ADMIN — METFORMIN HYDROCHLORIDE 1000 MG: 500 TABLET, EXTENDED RELEASE ORAL at 08:26

## 2019-11-10 RX ADMIN — GABAPENTIN 300 MG: 300 CAPSULE ORAL at 08:25

## 2019-11-10 RX ADMIN — TAZOBACTAM SODIUM AND PIPERACILLIN SODIUM 3.38 G: 375; 3 INJECTION, SOLUTION INTRAVENOUS at 14:11

## 2019-11-10 RX ADMIN — TAZOBACTAM SODIUM AND PIPERACILLIN SODIUM 3.38 G: 375; 3 INJECTION, SOLUTION INTRAVENOUS at 02:16

## 2019-11-10 RX ADMIN — GABAPENTIN 300 MG: 300 CAPSULE ORAL at 16:10

## 2019-11-10 RX ADMIN — TAZOBACTAM SODIUM AND PIPERACILLIN SODIUM 3.38 G: 375; 3 INJECTION, SOLUTION INTRAVENOUS at 08:45

## 2019-11-10 RX ADMIN — ATORVASTATIN CALCIUM 40 MG: 40 TABLET, FILM COATED ORAL at 08:25

## 2019-11-10 RX ADMIN — VANCOMYCIN HYDROCHLORIDE 1500 MG: 10 INJECTION, POWDER, LYOPHILIZED, FOR SOLUTION INTRAVENOUS at 05:39

## 2019-11-10 RX ADMIN — VANCOMYCIN HYDROCHLORIDE 1500 MG: 10 INJECTION, POWDER, LYOPHILIZED, FOR SOLUTION INTRAVENOUS at 14:45

## 2019-11-10 RX ADMIN — MULTIPLE VITAMINS W/ MINERALS TAB 1 TABLET: TAB at 08:25

## 2019-11-10 RX ADMIN — AMLODIPINE BESYLATE 5 MG: 5 TABLET ORAL at 08:25

## 2019-11-10 ASSESSMENT — ACTIVITIES OF DAILY LIVING (ADL)
ADLS_ACUITY_SCORE: 11

## 2019-11-10 NOTE — PLAN OF CARE
POD 2 from 2nd toe amputation of left foot   Dressing CDI, leg elevated   On RA, denies any pain/nausea,   PIV saline locked continues IV Zosyn and Vanco   Up independently, heel toe touch  Voided x 1 this shift

## 2019-11-10 NOTE — PLAN OF CARE
PT: Orders received. PT evaluation completed. Pt reports living in a house with a flight within. Pt is typically indep with all mobility. Pt has been heel WB before and was able to navigate his home without any assistive device.     Discharge Planner PT   Patient plan for discharge: Home today  Current status: Pt able to complete bed mobility, transfers, and gait with [place] within his heel WB precautions. Pt able to identify safe bathing, stair management, car transfers, and general ADL management. Pt with no concerns regarding mobility at home.   Barriers to return to prior living situation: None  Recommendations for discharge: Home  Rationale for recommendations: Pt has demonstrated the ability to complete all mobility safely. No further IP PT needs.        Entered by: Riddhi Davis 11/10/2019 2:43 PM

## 2019-11-10 NOTE — PLAN OF CARE
"Ambulatory Status:  Pt up ambulating to the bathroom.  Pain:  Pt reports pain around 3, refused pain medications.   Resp: LS WDL.   GI:  Denies nausea.  Adequate appetite and on regular diet.  BS WDL.  Passing flatus.  Last BM 11/9/2019.  :  Pt reports frequent urination.   Skin:  WDL.  Tx:  Zosyn, Vancomycin. Heal touch, weight bearing.   Labs:  BG 72, 145.   Consults:  Surgery.  Disposition:  Unknown, possibly tomorrow.   Vital signs:  Temp: 96.8  F (36  C) Temp src: Oral BP: (!) 149/68   Heart Rate: 64 Resp: 20 SpO2: 93 % O2 Device: None (Room air) Oxygen Delivery: 1 LPM Height: 180.3 cm (5' 10.98\") Weight: 108 kg (238 lb)  Estimated body mass index is 33.21 kg/m  as calculated from the following:    Height as of this encounter: 1.803 m (5' 10.98\").    Weight as of this encounter: 108 kg (238 lb).       "

## 2019-11-10 NOTE — PROGRESS NOTES
11/10/19 1420   Quick Adds   Type of Visit Initial PT Evaluation   Living Environment   Lives With spouse;child(zan), adult   Living Arrangements house   Home Accessibility stairs to enter home;stairs within home   Number of Stairs, Main Entrance 3   Stair Railings, Main Entrance none   Number of Stairs, Within Home, Primary 10   Stair Railings, Within Home, Primary railing on left side (ascending)   Transportation Anticipated family or friend will provide   Self-Care   Usual Activity Tolerance good   Current Activity Tolerance moderate   Regular Exercise No   Equipment Currently Used at Home none   Functional Level Prior   Ambulation 0-->independent   Transferring 0-->independent   Toileting 0-->independent   Bathing 0-->independent   Fall history within last six months no   Which of the above functional risks had a recent onset or change? ambulation;transferring;toileting;bathing   General Information   Onset of Illness/Injury or Date of Surgery - Date 11/07/19   Referring Physician Rachelle Manriquez   Patient/Family Goals Statement Discharge home today   Pertinent History of Current Problem (include personal factors and/or comorbidities that impact the POC) 59 year old male with a history of insulin-dependent type 2 diabetes, hypertension, history of osteomyelitis requiring toe amputation, who is admitted to the hospitalist service for management of osteomyelitis.    Precautions/Limitations fall precautions   Weight-Bearing Status - LLE other (see comments)  (heel WB)   Weight-Bearing Status - RLE full weight-bearing   General Observations Pt supine upon initiation, agreeable to session.    Cognitive Status Examination   Orientation orientation to person, place and time   Level of Consciousness alert   Follows Commands and Answers Questions 100% of the time   Personal Safety and Judgment intact   Memory intact   Pain Assessment   Patient Currently in Pain Yes, see Vital Sign flowsheet   Integumentary/Edema  "  Integumentary/Edema Comments Dressing intact to L LE   Posture    Posture Forward head position;Protracted shoulders   Range of Motion (ROM)   ROM Comment WFL   Strength   Strength Comments WFL for mobility   Bed Mobility   Bed Mobility Comments sit<>supine Fracisco   Transfer Skills   Transfer Comments sit<>stand with Fracisco   Gait   Gait Comments Fracisco with no AD   Balance   Balance Comments Mildly impaired 2/2 heel WB status   Sensory Examination   Sensory Perception no deficits were identified   Coordination   Coordination no deficits were identified   Muscle Tone   Muscle Tone no deficits were identified   Clinical Impression   Criteria for Skilled Therapeutic Intervention evaluation only   PT Diagnosis Impaired gait 2/2 WB status   Influenced by the following impairments WB status   Functional limitations due to impairments Impaired gait   Clinical Presentation Stable/Uncomplicated   Clinical Presentation Rationale medically stable, ready for DC   Clinical Decision Making (Complexity) Low complexity   Predicted Duration of Therapy Intervention (days/wks) eval only   Anticipated Discharge Disposition Home   Risk & Benefits of therapy have been explained Yes   Patient, Family & other staff in agreement with plan of care Yes   [facility] Doc[employer] TM \"6 Clicks\"   2016, Trustees of [facility], under license to [employer].  All rights reserved.   6 Clicks Short Forms Basic Mobility Inpatient Short Form   [facility] [employer]-PAC  \"6 Clicks\" V.2 Basic Mobility Inpatient Short Form   1. Turning from your back to your side while in a flat bed without using bedrails? 4 - None   2. Moving from lying on your back to sitting on the side of a flat bed without using bedrails? 4 - None   3. Moving to and from a bed to a chair (including a wheelchair)? 4 - None   4. Standing up from a chair using your arms (e.g., wheelchair, or bedside chair)? 4 - None   5. To walk in hospital room? 4 - None   6. Climbing 3-5 steps " with a railing? 3 - A Little   Basic Mobility Raw Score (Score out of 24.Lower scores equate to lower levels of function) 23   Total Evaluation Time   Total Evaluation Time (Minutes) 15

## 2019-11-10 NOTE — PLAN OF CARE
Pt to D/C to home.  Pt provided with d/c instructions, including new medications, when medications were last given, and when to take them again.  Pt also informed to f/u with Dr. Manriquez in two weeks. Pt verbalized understanding of all d/c and f/u instructions.  All questions were answered at this time.  Copy of paperwork sent with pt.  Medication/Scripts Keflex sent with pt. Also sent additional supplies in case LLE dressing gets wet.  Wife to provide transport.  All personal belongings sent with pt.

## 2019-11-10 NOTE — PLAN OF CARE
Ambulatory Status:  Pt up Ind. Took shower today.  VS:  stable; afebrile.  Pain:  denies.  Resp: LS clear on RA  GI:  denies nausea.  regular diet.  BS active.  Passing flatus.  Last BM 11/9.  :  voiding  Skin:  L foot dressing changed. Surgical boot for Left foot with ambulation.   Tx:  vanco; zosyn  Labs:  creat 0.97. hgb 12. ; 143;157.  Consults:  poditary/PT  Disposition:  today

## 2019-11-11 ENCOUNTER — TELEPHONE (OUTPATIENT)
Dept: FAMILY MEDICINE | Facility: CLINIC | Age: 59
End: 2019-11-11

## 2019-11-11 NOTE — TELEPHONE ENCOUNTER
Please call patient for Inpatient Discharge f/u 11/10/19. Diabetic Ulcer Of Lower Extremity. Ruth Behrens.

## 2019-11-12 NOTE — TELEPHONE ENCOUNTER
"  ED for acute condition Discharge Protocol    \"Hi, my name is Tenisha Steele RN, a registered nurse, and I am calling from [facility].  I am calling to follow up and see how things are going for you after your recent emergency visit.\"    Tell me how you are doing now that you are home?\" I'm doing good, wearing cast      Discharge Instructions    \"Let's review your discharge instructions.  What is/are the follow-up recommendations?  Pt. Response: See podiatrist and take antibiotics, wear cast    \"Has an appointment with your primary care provider been scheduled?\"  no, patient will call to schedule appt with podiatrist    Medications    \"Tell me what changed about your medicines when you discharged?\"    keflex    \"What questions do you have about your medications?\"   patient wondering if he should continue with cipro prescribed in the emergency room on 10/31. Advised to only take Keflex        Call Summary    \"What questions or concerns do you have about your recent visit and your follow-up care?\"     none    \"If you have questions or things don't continue to improve, we encourage you contact us through the main clinic number (give number).  Even if the clinic is not open, triage nurses are available 24/7 to help you.     We would like you to know that our clinic has extended hours (provide information).  We also have urgent care (provide details on closest location and hours/contact info)\"    \"Thank you for your time and take care!\"          Ivette Steven, JOSEN, RN, PHN        "

## 2019-11-13 LAB
BACTERIA SPEC CULT: NO GROWTH
BACTERIA SPEC CULT: NO GROWTH
COPATH REPORT: NORMAL
Lab: NORMAL
Lab: NORMAL
SPECIMEN SOURCE: NORMAL
SPECIMEN SOURCE: NORMAL

## 2019-11-26 ENCOUNTER — OFFICE VISIT (OUTPATIENT)
Dept: PODIATRY | Facility: CLINIC | Age: 59
End: 2019-11-26
Payer: COMMERCIAL

## 2019-11-26 VITALS
HEIGHT: 71 IN | DIASTOLIC BLOOD PRESSURE: 78 MMHG | WEIGHT: 238 LBS | SYSTOLIC BLOOD PRESSURE: 138 MMHG | BODY MASS INDEX: 33.32 KG/M2

## 2019-11-26 DIAGNOSIS — Z89.422 H/O AMPUTATION OF LESSER TOE, LEFT (H): ICD-10-CM

## 2019-11-26 DIAGNOSIS — E11.42 TYPE 2 DIABETES MELLITUS WITH PERIPHERAL NEUROPATHY (H): Primary | ICD-10-CM

## 2019-11-26 DIAGNOSIS — Z98.890 POST-OPERATIVE STATE: ICD-10-CM

## 2019-11-26 PROCEDURE — 99024 POSTOP FOLLOW-UP VISIT: CPT | Performed by: PODIATRIST

## 2019-11-26 ASSESSMENT — MIFFLIN-ST. JEOR: SCORE: 1926.37

## 2019-11-26 NOTE — PATIENT INSTRUCTIONS
Thank you for choosing [facility]!    DR. GARCIA'S CLINIC SCHEDULE  MONDAY AM - [place] TUESDAY - [place]   [address] [address] [place] [address] [place] [address]   [phone] / [phone] / [phone]       WEDNESDAY - [place] FRIDAY AM - [facility]   [address]   [place] [address] [place] [address]   [phone] / [phone]       FRIDAY PM - [place] SCHEDULE SURGERY: [phone]   [address] BILLING QUESTIONS: [phone]   [place] [address] AFTER HOURS: [phone]   [phone] / [phone] APPOINTMENTS: [phone]     Consumer Price Line (CPL) [phone]       One month follow up        BODY WEIGHT AND YOUR FEET  The following information is included in the after visit summary for all patients. Body weight can be a sensitive issue to discuss in clinic, but we think the following information is very important. Although we focus on the feet and ankles, we do support the overall health of our patients.     Many things can cause foot and ankle problems. Foot structure, activity level, foot mechanics and injuries are common causes of pain. One very important issue that often goes unmentioned, is body weight. Extra weight can cause increased stress on muscles, ligaments, bones and tendons. Sometimes just a few extra pounds is all it takes to put one over her/his threshold. Without reducing that stress, it can be difficult to alleviate pain. As Foot & Ankle specialists, our job is addressing the lower extremity problem and possible causes. Regarding extra body weight, we encourage patients to discuss diet and weight management plans with their primary care doctors. It is this team approach that gives you the best opportunity for pain relief and getting you back on your feet.      [facility] has a Comprehensive Weight Management Program. This program  includes counseling, education, non-surgical and surgical approaches to weight loss. If you are interested in learning more either talk to you primary care provider or call [phone].

## 2019-11-26 NOTE — LETTER
"    11/26/2019         RE: Crispin Rojas  [address] [place] [address]        Dear Colleague,    Thank you for referring your patient, Crispin Rojas, to the [facility]. Please see a copy of my visit note below.    Podiatry / Foot and Ankle Surgery Progress Note    November 26, 2019    Subject: Patient was seen for follow up on 2 weeks s/p left 2nd toe amputation. Denies fever, chills, nausa. Has been in boot. No concerns.     Objective:  Vitals: /78   Ht 1.803 m (5' 10.98\")   Wt 108 kg (238 lb)   BMI 33.21 kg/m     A1C: 5.9 (10/2019)    General:  Patient is alert and orientated.  NAD  Dressing c/d/i. Sutures intact. Skin is well approximated. No redness, dehiscence or signs of infection. Left 1st and 2nd toe now amptuated.     Assessment:    Type 2 diabetes mellitus with peripheral neuropathy (H)  Post-operative state  H/O amputation of lesser toe, left (H)    Plan:  Sutures removed. Patient can get foot wet and go back to regular socks and shoes. Talked about partial removal of left 3rd-5th toes left foot and right 2nd and 3rd toes as they are hammered and could lead to wounds. He is going to think about it. Follow up in   1 month for reassessment. Was told to call with questions or concerns.    Rachelle Manriquez DPM, Podiatry/Foot and Ankle Surgery    Weight management plan: Patient was referred to their PCP to discuss a diet and exercise plan.    Recommended to Crispin Rojas to follow up with Primary Care provider regarding elevated blood pressure.      Again, thank you for allowing me to participate in the care of your patient.        Sincerely,        Rachelle Manriquez DPM, Podiatry/Foot and Ankle Surgery    " no

## 2019-11-26 NOTE — PROGRESS NOTES
"Podiatry / Foot and Ankle Surgery Progress Note    November 26, 2019    Subject: Patient was seen for follow up on 2 weeks s/p left 2nd toe amputation. Denies fever, chills, nausa. Has been in boot. No concerns.     Objective:  Vitals: /78   Ht 1.803 m (5' 10.98\")   Wt 108 kg (238 lb)   BMI 33.21 kg/m    A1C: 5.9 (10/2019)    General:  Patient is alert and orientated.  NAD  Dressing c/d/i. Sutures intact. Skin is well approximated. No redness, dehiscence or signs of infection. Left 1st and 2nd toe now amptuated.     Assessment:    Type 2 diabetes mellitus with peripheral neuropathy (H)  Post-operative state  H/O amputation of lesser toe, left (H)    Plan:  Sutures removed. Patient can get foot wet and go back to regular socks and shoes. Talked about partial removal of left 3rd-5th toes left foot and right 2nd and 3rd toes as they are hammered and could lead to wounds. He is going to think about it. Follow up in   1 month for reassessment. Was told to call with questions or concerns.    Rachelle Manriquez DPM, Podiatry/Foot and Ankle Surgery    Weight management plan: Patient was referred to their PCP to discuss a diet and exercise plan.    Recommended to Crispin Rojas to follow up with Primary Care provider regarding elevated blood pressure.    "

## 2019-12-04 ENCOUNTER — OFFICE VISIT (OUTPATIENT)
Dept: SLEEP MEDICINE | Facility: CLINIC | Age: 59
End: 2019-12-04
Payer: COMMERCIAL

## 2019-12-04 VITALS
RESPIRATION RATE: 18 BRPM | DIASTOLIC BLOOD PRESSURE: 90 MMHG | HEIGHT: 72 IN | OXYGEN SATURATION: 95 % | BODY MASS INDEX: 32.78 KG/M2 | SYSTOLIC BLOOD PRESSURE: 171 MMHG | WEIGHT: 242 LBS | HEART RATE: 69 BPM

## 2019-12-04 DIAGNOSIS — G47.33 OSA (OBSTRUCTIVE SLEEP APNEA): Primary | ICD-10-CM

## 2019-12-04 PROCEDURE — 99213 OFFICE O/P EST LOW 20 MIN: CPT | Performed by: INTERNAL MEDICINE

## 2019-12-04 ASSESSMENT — MIFFLIN-ST. JEOR: SCORE: 1956.73

## 2019-12-04 NOTE — PROGRESS NOTES
Obstructive Sleep Apnea - PAP Follow-Up Visit:    Chief Complaint   Patient presents with     Sleep Apnea       Crispin Rojas comes in today for follow-up of their severe sleep apnea, managed with CPAP.     He was initially diagnosed in 2010 with an AHI of 59.8 per hour. He had abandoned therapy and was re qualified with an HST on 10/18/2018. AHI was 34 per hour.     Patient has been struggling with CPAP. He takes it off during the night for unknown reasons. He has experienced benefoit on nights that he wore it. He thinks that a different mask could help.     He uses full-face mask.     Bedtime is typically 12 am. Usually it takes about 30 minutes to fall asleep with the mask on. Wake time is typically 8:30 am.  The patient is usually getting 8 hours of sleep per night.    He does not feel rested in the morning.      ResMed     Auto-PAP 5-12 cmH2O download:  10/30 total days of use. 20 nonuse days. 2/30 days with >4 hours use.  Average use 2 hours 43 minutes per day. Median Leak 8 L/min. 95%ile Leak 22 L/min. CPAP 95% pressure 11.8cm. AHI 11.2 (central :1.3, obstructive 5.5, HI: 2.8)      Reviewed by team:      Reviewed by provider:        Problem List:  Patient Active Problem List    Diagnosis Date Noted     Post-operative state 11/08/2019     Priority: Medium     Diabetic ulcer of lower extremity (H) 11/07/2019     Priority: Medium     Osteomyelitis (H) 08/01/2019     Priority: Medium     Cholecystitis 07/07/2019     Priority: Medium     Cellulitis 06/24/2019     Priority: Medium     Thoracic aortic aneurysm without rupture (H) 04/29/2019     Priority: Medium     GILA (obstructive sleep apnea) 10/22/2018     Priority: Medium     HST 10/18/2018, AHI : 34.1       Right bundle branch block 06/23/2018     Priority: Medium     On ECG 6/23/2018, advised to f/u with PCP       Hyperlipidemia LDL goal <100 06/07/2018     Priority: Medium     Essential hypertension 06/07/2018     Priority: Medium     Type 2 diabetes  "mellitus with peripheral neuropathy (H) 11/27/2017     Priority: Medium     Chronic left shoulder pain 01/30/2017     Priority: Medium     Cervicalgia 01/30/2017     Priority: Medium     Calculus of kidney 08/30/2016     Priority: Medium     Morbid obesity, unspecified obesity type (H) 11/04/2015     Priority: Medium     Other specified transient cerebral ischemias 11/04/2015     Priority: Medium     CARDIOVASCULAR SCREENING; LDL GOAL LESS THAN 100 10/31/2010     Priority: Medium     Disorder of bursae and tendons in shoulder region 06/28/2004     Priority: Medium     Problem list name updated by automated process. Provider to review            BP (!) 171/90   Pulse 69   Resp 18   Ht 1.822 m (5' 11.75\")   Wt 109.8 kg (242 lb)   SpO2 95%   BMI 33.05 kg/m      Impression/Plan:     Severe sleep apnea.     - Not Tolerating PAP well. I will increase his auto PAP settings to see if this helps. We discussed alternate therapy options like dental appliance if he is unable to tolerate PAP therapy. At this time, patient will continue his efforts to improve adherence to CPAP.     Plan:     1. Continue auto PAP therapy 8-15 cm H2O     Crispin AKASH Rojas will follow up in about 1 year(s).     Fifteen minutes spent with patient, all of which were spent face-to-face counseling, consulting, coordinating plan of care.      David Evans MD, MD    CC:  Aden Lopez,   "

## 2019-12-04 NOTE — PATIENT INSTRUCTIONS
Your BMI is Body mass index is 33.05 kg/m .  Weight management is a personal decision.  If you are interested in exploring weight loss strategies, the following discussion covers the approaches that may be successful. Body mass index (BMI) is one way to tell whether you are at a healthy weight, overweight, or obese. It measures your weight in relation to your height.  A BMI of 18.5 to 24.9 is in the healthy range. A person with a BMI of 25 to 29.9 is considered overweight, and someone with a BMI of 30 or greater is considered obese. More than two-thirds of American adults are considered overweight or obese.  Being overweight or obese increases the risk for further weight gain. Excess weight may lead to heart disease and diabetes.  Creating and following plans for healthy eating and physical activity may help you improve your health.  Weight control is part of healthy lifestyle and includes exercise, emotional health, and healthy eating habits. Careful eating habits lifelong are the mainstay of weight control. Though there are significant health benefits from weight loss, long-term weight loss with diet alone may be very difficult to achieve- studies show long-term success with dietary management in less than 10% of people. Attaining a healthy weight may be especially difficult to achieve in those with severe obesity. In some cases, medications, devices and surgical management might be considered.  What can you do?  If you are overweight or obese and are interested in methods for weight loss, you should discuss this with your provider.     Consider reducing daily calorie intake by 500 calories.     Keep a food journal.     Avoiding skipping meals, consider cutting portions instead.    Diet combined with exercise helps maintain muscle while optimizing fat loss. Strength training is particularly important for building and maintaining muscle mass. Exercise helps reduce stress, increase energy, and improves fitness.  Increasing exercise without diet control, however, may not burn enough calories to loose weight.       Start walking three days a week 10-20 minutes at a time    Work towards walking thirty minutes five days a week     Eventually, increase the speed of your walking for 1-2 minutes at time    In addition, we recommend that you review healthy lifestyles and methods for weight loss available through the National Institutes of Health patient information sites:  http://win.niddk.nih.gov/publications/index.htm    And look into health and wellness programs that may be available through your health insurance provider, employer, local community center, or kathrine club.    Weight management plan: Patient was referred to their PCP to discuss a diet and exercise plan.

## 2019-12-18 ENCOUNTER — TELEPHONE (OUTPATIENT)
Dept: FAMILY MEDICINE | Facility: CLINIC | Age: 59
End: 2019-12-18

## 2019-12-18 NOTE — TELEPHONE ENCOUNTER
Patient Quality Outreach      Summary:    Patient is due/failing the following:   A1C and BP check  Offer px. Last px 1/10/19  Type of outreach:    Phone, spoke to patient. Px set nurse only set.    Questions for provider review:    None                                                                                   Patient has the following on his problem list:     Hypertension   Last three blood pressure readings:  BP Readings from Last 3 Encounters:   12/04/19 (!) 171/90   11/26/19 138/78   11/10/19 (!) 143/63     Blood pressure: Failed    HTN Guidelines:  ? 139/89                                                      Karen Hein       Chart routed to none.

## 2019-12-20 ENCOUNTER — TELEPHONE (OUTPATIENT)
Dept: FAMILY MEDICINE | Facility: CLINIC | Age: 59
End: 2019-12-20

## 2019-12-20 ENCOUNTER — OFFICE VISIT (OUTPATIENT)
Dept: URGENT CARE | Facility: URGENT CARE | Age: 59
End: 2019-12-20
Payer: COMMERCIAL

## 2019-12-20 ENCOUNTER — HOSPITAL ENCOUNTER (EMERGENCY)
Facility: CLINIC | Age: 59
Discharge: HOME | End: 2019-12-20
Attending: EMERGENCY MEDICINE | Admitting: EMERGENCY MEDICINE
Payer: COMMERCIAL

## 2019-12-20 ENCOUNTER — APPOINTMENT (OUTPATIENT)
Dept: MRI IMAGING | Facility: CLINIC | Age: 59
End: 2019-12-20
Attending: NURSE PRACTITIONER
Payer: COMMERCIAL

## 2019-12-20 VITALS
TEMPERATURE: 98.4 F | DIASTOLIC BLOOD PRESSURE: 91 MMHG | RESPIRATION RATE: 18 BRPM | SYSTOLIC BLOOD PRESSURE: 179 MMHG | HEART RATE: 59 BPM | OXYGEN SATURATION: 96 %

## 2019-12-20 VITALS
DIASTOLIC BLOOD PRESSURE: 82 MMHG | SYSTOLIC BLOOD PRESSURE: 160 MMHG | OXYGEN SATURATION: 97 % | TEMPERATURE: 97.1 F | HEART RATE: 62 BPM

## 2019-12-20 DIAGNOSIS — R51.9 HEADACHE: ICD-10-CM

## 2019-12-20 DIAGNOSIS — R51.9 HEADACHE DISORDER: Primary | ICD-10-CM

## 2019-12-20 DIAGNOSIS — E11.42 TYPE 2 DIABETES MELLITUS WITH PERIPHERAL NEUROPATHY (H): ICD-10-CM

## 2019-12-20 DIAGNOSIS — I10 ESSENTIAL HYPERTENSION: ICD-10-CM

## 2019-12-20 LAB
ANION GAP SERPL CALCULATED.3IONS-SCNC: 4 MMOL/L (ref 3–14)
BASOPHILS # BLD AUTO: 0.1 10E9/L (ref 0–0.2)
BASOPHILS NFR BLD AUTO: 0.8 %
BUN SERPL-MCNC: 17 MG/DL (ref 7–30)
CALCIUM SERPL-MCNC: 9.3 MG/DL (ref 8.5–10.1)
CHLORIDE SERPL-SCNC: 108 MMOL/L (ref 94–109)
CO2 SERPL-SCNC: 28 MMOL/L (ref 20–32)
CREAT SERPL-MCNC: 0.97 MG/DL (ref 0.66–1.25)
DIFFERENTIAL METHOD BLD: ABNORMAL
EOSINOPHIL # BLD AUTO: 0.3 10E9/L (ref 0–0.7)
EOSINOPHIL NFR BLD AUTO: 4.1 %
ERYTHROCYTE [DISTWIDTH] IN BLOOD BY AUTOMATED COUNT: 13.3 % (ref 10–15)
GFR SERPL CREATININE-BSD FRML MDRD: 86 ML/MIN/{1.73_M2}
GLUCOSE SERPL-MCNC: 82 MG/DL (ref 70–99)
HCT VFR BLD AUTO: 41.9 % (ref 40–53)
HGB BLD-MCNC: 13.6 G/DL (ref 13.3–17.7)
IMM GRANULOCYTES # BLD: 0 10E9/L (ref 0–0.4)
IMM GRANULOCYTES NFR BLD: 0.3 %
LYMPHOCYTES # BLD AUTO: 2.8 10E9/L (ref 0.8–5.3)
LYMPHOCYTES NFR BLD AUTO: 39 %
MCH RBC QN AUTO: 29.2 PG (ref 26.5–33)
MCHC RBC AUTO-ENTMCNC: 32.5 G/DL (ref 31.5–36.5)
MCV RBC AUTO: 90 FL (ref 78–100)
MONOCYTES # BLD AUTO: 0.6 10E9/L (ref 0–1.3)
MONOCYTES NFR BLD AUTO: 8 %
NEUTROPHILS # BLD AUTO: 3.4 10E9/L (ref 1.6–8.3)
NEUTROPHILS NFR BLD AUTO: 47.8 %
NRBC # BLD AUTO: 0 10*3/UL
NRBC BLD AUTO-RTO: 0 /100
PLATELET # BLD AUTO: 149 10E9/L (ref 150–450)
POTASSIUM SERPL-SCNC: 3.7 MMOL/L (ref 3.4–5.3)
RBC # BLD AUTO: 4.66 10E12/L (ref 4.4–5.9)
SODIUM SERPL-SCNC: 140 MMOL/L (ref 133–144)
WBC # BLD AUTO: 7.1 10E9/L (ref 4–11)

## 2019-12-20 PROCEDURE — 70553 MRI BRAIN STEM W/O & W/DYE: CPT

## 2019-12-20 PROCEDURE — 99215 OFFICE O/P EST HI 40 MIN: CPT | Performed by: FAMILY MEDICINE

## 2019-12-20 PROCEDURE — 96375 TX/PRO/DX INJ NEW DRUG ADDON: CPT

## 2019-12-20 PROCEDURE — 96361 HYDRATE IV INFUSION ADD-ON: CPT

## 2019-12-20 PROCEDURE — 96374 THER/PROPH/DIAG INJ IV PUSH: CPT

## 2019-12-20 PROCEDURE — 25500064 ZZH RX 255 OP 636: Performed by: EMERGENCY MEDICINE

## 2019-12-20 PROCEDURE — A9585 GADOBUTROL INJECTION: HCPCS | Performed by: EMERGENCY MEDICINE

## 2019-12-20 PROCEDURE — 80048 BASIC METABOLIC PNL TOTAL CA: CPT | Performed by: NURSE PRACTITIONER

## 2019-12-20 PROCEDURE — 99285 EMERGENCY DEPT VISIT HI MDM: CPT | Mod: 25

## 2019-12-20 PROCEDURE — 85025 COMPLETE CBC W/AUTO DIFF WBC: CPT | Performed by: NURSE PRACTITIONER

## 2019-12-20 PROCEDURE — 25000128 H RX IP 250 OP 636: Performed by: NURSE PRACTITIONER

## 2019-12-20 PROCEDURE — 25800030 ZZH RX IP 258 OP 636: Performed by: NURSE PRACTITIONER

## 2019-12-20 RX ORDER — METOCLOPRAMIDE HYDROCHLORIDE 5 MG/ML
10 INJECTION INTRAMUSCULAR; INTRAVENOUS ONCE
Status: COMPLETED | OUTPATIENT
Start: 2019-12-20 | End: 2019-12-20

## 2019-12-20 RX ORDER — KETOROLAC TROMETHAMINE 15 MG/ML
15 INJECTION, SOLUTION INTRAMUSCULAR; INTRAVENOUS ONCE
Status: COMPLETED | OUTPATIENT
Start: 2019-12-20 | End: 2019-12-20

## 2019-12-20 RX ORDER — GADOBUTROL 604.72 MG/ML
10 INJECTION INTRAVENOUS ONCE
Status: COMPLETED | OUTPATIENT
Start: 2019-12-20 | End: 2019-12-20

## 2019-12-20 RX ORDER — IBUPROFEN 600 MG/1
600 TABLET, FILM COATED ORAL EVERY 6 HOURS PRN
Qty: 28 TABLET | Refills: 0 | Status: SHIPPED | OUTPATIENT
Start: 2019-12-20 | End: 2019-12-27

## 2019-12-20 RX ORDER — DIPHENHYDRAMINE HYDROCHLORIDE 50 MG/ML
25 INJECTION INTRAMUSCULAR; INTRAVENOUS ONCE
Status: COMPLETED | OUTPATIENT
Start: 2019-12-20 | End: 2019-12-20

## 2019-12-20 RX ADMIN — GADOBUTROL 10 ML: 604.72 INJECTION INTRAVENOUS at 22:27

## 2019-12-20 RX ADMIN — KETOROLAC TROMETHAMINE 15 MG: 15 INJECTION, SOLUTION INTRAMUSCULAR; INTRAVENOUS at 20:54

## 2019-12-20 RX ADMIN — METOCLOPRAMIDE 10 MG: 5 INJECTION, SOLUTION INTRAMUSCULAR; INTRAVENOUS at 20:54

## 2019-12-20 RX ADMIN — DIPHENHYDRAMINE HYDROCHLORIDE 25 MG: 50 INJECTION, SOLUTION INTRAMUSCULAR; INTRAVENOUS at 20:54

## 2019-12-20 RX ADMIN — SODIUM CHLORIDE 1000 ML: 9 INJECTION, SOLUTION INTRAVENOUS at 20:54

## 2019-12-20 ASSESSMENT — ENCOUNTER SYMPTOMS
CHILLS: 0
ABDOMINAL PAIN: 0
LIGHT-HEADEDNESS: 0
SHORTNESS OF BREATH: 0
FACIAL ASYMMETRY: 0
WEAKNESS: 0
COUGH: 0
HEADACHES: 1
DIZZINESS: 0
FEVER: 0
NUMBNESS: 0

## 2019-12-20 NOTE — ED AVS SNAPSHOT
[facility]  [address]  [facility] [address]  Phone:  [phone]  Fax:  [phone]                                    Crispin Rojas   MRN: [id number]    Department:  [facility]   Date of Visit:  12/20/2019           After Visit Summary Signature Page    I have received my discharge instructions, and my questions have been answered. I have discussed any challenges I see with this plan with the nurse or doctor.    ..........................................................................................................................................  Patient/Patient Representative Signature      ..........................................................................................................................................  Patient Representative Print Name and Relationship to Patient    ..................................................               ................................................  Date                                   Time    ..........................................................................................................................................  Reviewed by Signature/Title    ...................................................              ..............................................  Date                                               Time          22EPIC Rev 08/18

## 2019-12-20 NOTE — TELEPHONE ENCOUNTER
Patient calling and states he ahs had a headache for 1 1/2 weeks and it won't go away.  States bp has been elevated 140/80's.  Has been using aspirin and helps a little but headache not going away.  States headache is worse today so thought he should call.  Is not severe.  Advised UC today for evaluation.  Discussed locations and hours.  Patient agrees with plan.  Michaela Ahumada RN

## 2019-12-21 NOTE — ED PROVIDER NOTES
History     Chief Complaint:    Headache      HPI   Crispin Rojas is a 59 year old diabetic male who presents with HA x 14 days.  Denies fever, chills, n/v, weakness, or head injury.  Has been taking 324mg ASA TID with little relief.  Today he feels that the HA has increased and now has photophobia.  Denies any neuro/visual changes.        Allergies:  Niacin  Simvastatin       Medications:    Amlodipine  Aspirin   Lipitor  Cialis  Neurontin  Tresiba  Humalog  Lisinopril  Metformin  Ozempic     Past Medical History:    Cerebral infarction  Chronic infection  Heartburn/GERD  Liver cirrhosis  Hypertension  Numbness and tingling  Obesity  GILA  Osteomyelitis  Kidney stones  Type II diabetes  Right bundle branch block  Thoracic aortic aneurysm without rupture      Past Surgical History:    Amputate foot - left  Amputate toe(s)  x2  Angiogram  Colonoscopy  Combined cystoscopy, retrograde, ureteroscopy, insert stent  Combined cystoscopy, retrogrades, ureteroscopy, laser holmium lithotripsy ureter(s), insert stent  Extracorporeal shock wave lithotripsy  Recession gastrocnemius     Family History:    Arthritis  Lupus  Diabetes  CVD  Cancer    Social History:  The patient was accompanied to the ED with wife.  Smoking Status: Never  Smokeless Tobacco: Never  Alcohol Use: Yes   Drug Use: No   PCP: Aden Lopez   Marital Status:       Review of Systems   Constitutional: Negative for chills and fever.   Respiratory: Negative for cough and shortness of breath.    Cardiovascular: Negative for chest pain and leg swelling.   Gastrointestinal: Negative for abdominal pain.   Neurological: Positive for headaches. Negative for dizziness, syncope, facial asymmetry, weakness, light-headedness and numbness.   All other systems reviewed and are negative.        Physical Exam   First Vitals:  BP: (!) 192/90  Pulse: 64  Temp: 98.4  F (36.9  C)  Resp: 18  SpO2: 100 %      Physical Exam  Constitutional:       General: He is  not in acute distress.     Appearance: Normal appearance. He is not ill-appearing, toxic-appearing or diaphoretic.   HENT:      Head: Atraumatic.      Nose: Nose normal.      Mouth/Throat:      Mouth: Mucous membranes are moist.   Eyes:      General: No visual field deficit.     Extraocular Movements: Extraocular movements intact.      Conjunctiva/sclera: Conjunctivae normal.      Pupils: Pupils are equal, round, and reactive to light.   Neck:      Musculoskeletal: Normal range of motion. No neck rigidity or muscular tenderness.   Cardiovascular:      Rate and Rhythm: Normal rate and regular rhythm.      Pulses: Normal pulses.      Heart sounds: Normal heart sounds. No murmur. No gallop.    Pulmonary:      Effort: Pulmonary effort is normal. No respiratory distress.      Breath sounds: Normal breath sounds. No wheezing.   Musculoskeletal: Normal range of motion.      Right lower leg: No edema.      Left lower leg: No edema.   Lymphadenopathy:      Cervical: No cervical adenopathy.   Skin:     General: Skin is warm and dry.      Capillary Refill: Capillary refill takes less than 2 seconds.   Neurological:      General: No focal deficit present.      Mental Status: He is alert and oriented to person, place, and time.      GCS: GCS eye subscore is 4. GCS verbal subscore is 5. GCS motor subscore is 6.      Cranial Nerves: No facial asymmetry.      Sensory: Sensation is intact.      Motor: Motor function is intact. No weakness, tremor or pronator drift.   Psychiatric:         Mood and Affect: Mood normal.         Behavior: Behavior normal.         Thought Content: Thought content normal.         Judgment: Judgment normal.           Emergency Department Course   ECG:      Imaging:    Emergency Department Course:  ED Course as of Dec 20 2250   Fri Dec 20, 2019   2134 Patient sleeping in room waiting for MRI.  Wakes to voice, reports decreased HA.       2234 Patient reassessed after returning from MRI, denies any HA at this  time, however he states it increased at MRI.             Impression & Plan         Medical Decision Making:  The patient presented with a headache.  Evaluation in the emergency department has been negative. The patient has not had any fever, weakness, numbness, paresthesia, neck stiffness or confusion. Differential diagnosis may include: meningitis, subarachnoid hemorrhage, CNS tumor, and stroke but are considered unlikely due to negative MRI. Upon reassessment his pain has improved with medications in the ED and I feel he is stable for DC home.   The patient should follow-up with his primary physician within 2-3 days.   Return if increasing pain, fever, vomiting or weakness.  Take prescribed medications as directed.        Diagnosis:    ICD-10-CM    1. Headache R51        Disposition:  discharged to home with family    Discharge Medications:  New Prescriptions    IBUPROFEN (ADVIL/MOTRIN) 600 MG TABLET    Take 1 tablet (600 mg) by mouth every 6 hours as needed for moderate pain       Garrett Martin  12/20/2019   [facility] EMERGENCY DEPARTMENT       Alisia Sharma NP  12/20/19 1488

## 2019-12-21 NOTE — PROGRESS NOTES
SUBJECTIVE:  Chief Complaint   Patient presents with     Urgent Care     Headache     ongoing headache for 10 days-denies blurred vision and dizziness-No known head injury     Crispin Rojas is a 59 year old male who comes in for evaluation of headache.  Headache began 10day(s)}ago and is gradual onset, still present, worsening and constant  DESCRIPTION OF HEADACHE:   Location of pain: bilateral and whole head   Character of pain:aching and throbbing   Severity of pain: severe  Today 9/10- has gradually worsened  Accompanying symptoms: none .  Patient denies nausea and vomiting.  No fevers/ chills, no body aches, no sore throat, no cough, no vision changes  Patient denies loss of vision, diplopia, paralysis,  unilateral weakness, paresthesias  He had past stroke-  That was the worse headache of his life,  But the current headache is the second worse headache of his life  Prodromal sx?: none   Rapidity of onset: gradual     History of Migraines: No -  Says he generally does not get headaches  No improvement with ibuprofen/ acetaminophen     History of hypertension- taking amlodipine and lisinopril- no recent changes    Type 2 diabetes with neuropathy-  Hgb A1c 6.8 four months ago-  Using insulin long and short acting, metformin, semaglutide          Past Medical History:   Diagnosis Date     Cerebral infarction (H)      Chronic infection      H/O heartburn      History of heartburn      Hypertension      Numbness and tingling      Obesity      GILA (obstructive sleep apnea) 10/22/2018     Renal stone      Type II or unspecified type diabetes mellitus without mention of complication, not stated as uncontrolled      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     CARDIOVASCULAR SCREENING; LDL GOAL LESS THAN 100     Morbid obesity, unspecified obesity type (H)     Other specified transient cerebral ischemias     Calculus of kidney     Chronic left shoulder pain      Cervicalgia     Type 2 diabetes mellitus with peripheral neuropathy (H)     Hyperlipidemia LDL goal <100     Essential hypertension     Right bundle branch block     GILA (obstructive sleep apnea)     Thoracic aortic aneurysm without rupture (H)     Cellulitis     Cholecystitis     Osteomyelitis (H)     Diabetic ulcer of lower extremity (H)     Post-operative state       ALLERGIES:  Niacin and Simvastatin    MEDs  amLODIPine (NORVASC) 5 MG tablet, TAKE 1 TABLET BY MOUTH DAILY  aspirin (ASA) 325 MG EC tablet, Take 325 mg by mouth daily  atorvastatin (LIPITOR) 40 MG tablet, TAKE 1 TABLET BY MOUTH EVERY DAY  Cholecalciferol (VITAMIN D3 PO), Take 1,000 Units by mouth daily   CIALIS 5 MG tablet, TAKE 1 TABLET BY MOUTH EVERY DAY AS NEEDED  Co-Enzyme Q10 100 MG CAPS, Take 100 mg by mouth daily   gabapentin (NEURONTIN) 100 MG capsule, Take 300 mg by mouth 3 times daily  insulin degludec (TRESIBA FLEXTOUCH) 100 UNIT/ML pen, Inject 55 Units Subcutaneous daily  insulin lispro (HUMALOG PEN) 100 UNIT/ML (1 unit dial) pen, 1 units per 9 grams of carbohydrate before each meal + 1:16 over 150 correction.  TDD 70 units  ketoconazole (NIZORAL) 2 % external shampoo, APPLY EXTERNALLY 2 TIMES A WEEK AS DIRECTED  ketoconazole (NIZORAL) 2 % external shampoo, Apply topically daily as needed , apply daily and wash off after 5 min  lisinopril (PRINIVIL/ZESTRIL) 40 MG tablet, Take 1 tablet (40 mg) by mouth daily  MAGNESIUM GLYCINATE PLUS PO, Take 400 mg by mouth 2 times daily  metFORMIN (GLUCOPHAGE-XR) 500 MG 24 hr tablet, TAKE 2 TABLETS BY MOUTH TWICE DAILY WITH MEALS  Multiple Vitamins-Minerals (MULTIVITAMIN PO), Take 1 tablet by mouth daily   Omega-3 Fatty Acids (OMEGA-3 FISH OIL PO), Take 1 g by mouth 2 times daily (with meals)   semaglutide (OZEMPIC, 1 MG/DOSE,) 2 MG/1.5ML pen, Inject 1 mg Subcutaneous every 7 days , on Wednesdays  blood glucose (NO BRAND SPECIFIED) lancets standard, Use to test blood sugar 3 times daily or as  directed.  blood glucose monitoring (NO BRAND SPECIFIED) meter device kit, Use to test blood sugar 3 times daily or as directed.  blood glucose monitoring (NO BRAND SPECIFIED) test strip, Use to test blood sugars 3 times daily or as directed  [] cephALEXin (KEFLEX) 500 MG capsule, Take 1 capsule (500 mg) by mouth 2 times daily for 10 days  Continuous Blood Gluc Sensor (FREESTYLE LUCERO 14 DAY SENSOR) MISC, 1 each every 14 days  Insulin Pen Needle (PEN NEEDLES) 31G X 5 MM MISC, 1 Device 5 times daily , TWICE DAILY WITH LANTUS AND 3 TIMES A DAY PRN WITH NOVOLOG FLEXPEN    No current facility-administered medications on file prior to visit.       Social History     Tobacco Use     Smoking status: Never Smoker     Smokeless tobacco: Never Used   Substance Use Topics     Alcohol use: Yes     Alcohol/week: 0.0 standard drinks     Comment: rare---red wine 2x per month       Family History   Problem Relation Age of Onset     Arthritis Mother         SLE     Connective Tissue Disorder Mother         lupus     Diabetes Mother      Cerebrovascular Disease Mother      Cancer Mother      Diabetes Father      Diabetes Maternal Grandfather      Diabetes Sister          Review Of Systems    Constitutional:  Negative for fevers, chills,   Skin: negative for rash or lesions  Eyes: negative for eye pain, visual changes  Ears/Nose/Throat: negative for earache  , sinus trouble,   sore throat  Respiratory: No shortness of breath, dyspnea on exertion    Cardiovascular: negative for, palpitations, tachycardia or chest pain  Gastrointestinal: negative for vomiting, abdominal pain or diarrhea  Genitourinary: negative for dysuria or hematuria  Back: negative for pain with back movement,  CVA pain  Musculoskeletal: negative for generalized joint pain, joint swelling or joint stiffness  Neurologic: negative for generalized or local weakness or incoordination  Psychiatric: negative for feeling anxious or depressed  Endocrine: negative for  thyroid disorder - has type 2 diabetes        OBJECTIVE:  BP (!) 158/76 (BP Location: Right arm, Patient Position: Sitting, Cuff Size: Adult Large)   Pulse 62   Temp 97.1  F (36.2  C) (Tympanic)   SpO2 97%    Repeat /82    GENERAL:  Alert, moderate distress due to headache symptoms    NEURO:  Cranial nerves 2 through 12 grossly intact and No facial droop, no lid lag, normal facial features  Walks on toes, heels and tandem walk without difficulty, Romberg negative.  Finger-nose accurate,    normal speech and mentation  HENT: External ears with no swelling or lesions   Nose and lips without  Swelling, ulcers, erythema or lesions  NECK: normal pain free ROM,  No tenderness to palpation  RESP: no labored respirations, no tachypnea  EXTREMITIES:   Full ROM without expression of pain or limitation x 4 extremities  SKIN: no suspicious lesions or rashes  PSYCH: mentation and affect appears normal and patient appearance--appropriately groomed            ASSESSMENT:    Essential hypertension    Blood pressure taken today was  elevated.     Patient should continue hypertension medications at current dose and schedule.     Frequent home/ store blood pressure checks are encouraged at different times of day.  Patient should keep a diary of blood pressure levels and share that information with the primary care provider.    Headache disorder    Due to his severe headache symptoms, past history of stroke with history of not having headaches-  He needs imaging to evaluate for possible infarction, bleed or other intracranial process- he was advised to have evaluation at the ER  He is at increased risk of stroke due his history, hypertension and diabetes    He will arrange his own transportation-    Type 2 diabetes mellitus with peripheral neuropathy (H)     continue current diabetes meds- insulins, metformin, semiglutide    If there are acute signs of worsening symptoms or weakness or lightheadedness then the patient should  be re-evaluated at UC,   or ER.

## 2019-12-21 NOTE — ED PROVIDER NOTES
Emergency Department Attending Supervision Note  11/30/2017  3:42 PM      I evaluated this patient in conjunction with Julissa Wright CNP      Briefly, the patient presented with  2 weeks atraumatic headache, no assoc fever, extremity weakness/numbness/tingling.  No neck pain and no recent chiropractic manipulation.        On my exam,       HEENT:  PERRL, measuring 4mm bilaterally, EOMI, visual acuity and fields intact, conjunctiva and lids normal, external ears unremarkable, Nares clear bilaterally, mmm, oropharynx without tonsillar hypertrophy/erythema/exudate    Neck: supple, painless ROM, no cervical lymphadenopathy    Lungs:  CTAB,  no resp distress    CV: rrr, no m/r/g, ppi    Abd: soft, nontender, nondistended, no rebound/masses/guarding/hsm    Ext: no peripheral edema    Skin: warm, dry, well perused, no rashes/bruising/lesions on exposed skin    Neuro:   alert, follows commands, speech clear, CN 2-12 intact,   strength 5/5 and symmetric in BUE intrinsic hand muscles as well as BLE  SILT in all 4 ext  Negative Pronator drift  cerebellar testing unremarkable  gait stable    Psych: Normal mood, normal affect          Results:    MR Brain w/o & w Contrast   Final Result   IMPRESSION:   1. No discrete mass lesion, hemorrhage or focal area suggestive of acute ischemia.   2. No abnormal signal or abnormal enhancement within brain parenchyma.            My impression is acute headache and elevated blood pressure.  Sx improved with therapies in ED.  No e/o end organ damage/dysfunction.  No focal neuro deficits.  Low clinical suspicion for occult SAH or CNS infection.  Good candidate for dc home and f/u PCP for BP.         Diagnosis    ICD-10-CM    1. Headache R51            Asher Watkins MD  [facility]         Asher Watkins MD  12/21/19 6882

## 2019-12-23 ENCOUNTER — ALLIED HEALTH/NURSE VISIT (OUTPATIENT)
Dept: FAMILY MEDICINE | Facility: CLINIC | Age: 59
End: 2019-12-23
Payer: COMMERCIAL

## 2019-12-23 ENCOUNTER — OFFICE VISIT (OUTPATIENT)
Dept: PODIATRY | Facility: CLINIC | Age: 59
End: 2019-12-23
Payer: COMMERCIAL

## 2019-12-23 ENCOUNTER — ANCILLARY PROCEDURE (OUTPATIENT)
Dept: GENERAL RADIOLOGY | Facility: CLINIC | Age: 59
End: 2019-12-23
Attending: PODIATRIST
Payer: COMMERCIAL

## 2019-12-23 VITALS — SYSTOLIC BLOOD PRESSURE: 132 MMHG | DIASTOLIC BLOOD PRESSURE: 70 MMHG

## 2019-12-23 VITALS
HEIGHT: 72 IN | BODY MASS INDEX: 32.78 KG/M2 | WEIGHT: 242 LBS | DIASTOLIC BLOOD PRESSURE: 70 MMHG | SYSTOLIC BLOOD PRESSURE: 132 MMHG

## 2019-12-23 DIAGNOSIS — M20.42 HAMMERTOE, BILATERAL: ICD-10-CM

## 2019-12-23 DIAGNOSIS — L97.512 DIABETIC ULCER OF OTHER PART OF RIGHT FOOT ASSOCIATED WITH TYPE 2 DIABETES MELLITUS, WITH FAT LAYER EXPOSED (H): ICD-10-CM

## 2019-12-23 DIAGNOSIS — M20.41 HAMMERTOE, BILATERAL: ICD-10-CM

## 2019-12-23 DIAGNOSIS — S98.111A AMPUTATION OF RIGHT GREAT TOE (H): ICD-10-CM

## 2019-12-23 DIAGNOSIS — E11.42 DIABETIC POLYNEUROPATHY ASSOCIATED WITH TYPE 2 DIABETES MELLITUS (H): Primary | ICD-10-CM

## 2019-12-23 DIAGNOSIS — S98.112A AMPUTATION OF LEFT GREAT TOE (H): ICD-10-CM

## 2019-12-23 DIAGNOSIS — E11.621 DIABETIC ULCER OF OTHER PART OF RIGHT FOOT ASSOCIATED WITH TYPE 2 DIABETES MELLITUS, WITH FAT LAYER EXPOSED (H): ICD-10-CM

## 2019-12-23 DIAGNOSIS — I10 BENIGN ESSENTIAL HYPERTENSION: ICD-10-CM

## 2019-12-23 DIAGNOSIS — E11.42 DIABETIC POLYNEUROPATHY ASSOCIATED WITH TYPE 2 DIABETES MELLITUS (H): ICD-10-CM

## 2019-12-23 DIAGNOSIS — Z23 ENCOUNTER FOR IMMUNIZATION: Primary | ICD-10-CM

## 2019-12-23 PROCEDURE — 99213 OFFICE O/P EST LOW 20 MIN: CPT | Mod: 24 | Performed by: PODIATRIST

## 2019-12-23 PROCEDURE — 90750 HZV VACC RECOMBINANT IM: CPT

## 2019-12-23 PROCEDURE — 99207 ZZC NO CHARGE NURSE ONLY: CPT

## 2019-12-23 PROCEDURE — 99024 POSTOP FOLLOW-UP VISIT: CPT | Performed by: PODIATRIST

## 2019-12-23 PROCEDURE — 11042 DBRDMT SUBQ TIS 1ST 20SQCM/<: CPT | Mod: 79 | Performed by: PODIATRIST

## 2019-12-23 PROCEDURE — 90471 IMMUNIZATION ADMIN: CPT

## 2019-12-23 PROCEDURE — 73630 X-RAY EXAM OF FOOT: CPT | Mod: RT

## 2019-12-23 ASSESSMENT — MIFFLIN-ST. JEOR: SCORE: 1960.7

## 2019-12-23 NOTE — PROGRESS NOTES
Podiatry / Foot and Ankle Surgery Progress Note    December 23, 2019    Subject: Patient was seen for follow up on 4 weeks s/p left 2nd toe amputation. Denies fever, chills, nausa. Notes left foot is good. New ulcer to right foot on the top. Notes it is healing slowly.     Objective:  Vitals: /70   Ht 1.829 m (6')   Wt 109.8 kg (242 lb)   BMI 32.82 kg/m    BMI= Body mass index is 32.82 kg/m .    A1C: 5.9 (10/2019)    General:  Patient is alert and orientated.  NAD  Vascular:  DP and PT pulses are palpable.  No varicosities noted but edema noted.   CFT's < 3secs.  Skin temp is normal  Neuro:  Light and gross touch sensation absent to feet.   Derm:  New ulceration to the dorsal right midfoot. Measures 0.6cm x 0.4cm x 0.2cm. no redness, purulent drainage or signs of infection. Base of wound is granular.   Musculoskeletal: Left 1st and 2nd toe now amptuated. partial amputations of the right 1st and 4th toes. Right 2,3 toes and left toes 3-5 are semi rigidly contracted.     Imaging: right foot xray - I personally reviewed the xrays.  No evidence of bone infection. Small little bone spur noted to area of dorsal ulcer.     Assessment:    Diabetic polyneuropathy associated with type 2 diabetes mellitus (H)  Diabetic ulcer of other part of right foot associated with type 2 diabetes mellitus, with fat layer exposed (H)  Amputation of left great toe (H)  Amputation of right great toe (H)  Hammertoe, bilateral    Plan:  Ordered xrays. Wound debrided. Recommend santyl to ulcer daily. Recommend boot at home. Follow up in one month. Currently no acute signs of infection. was told to call with questions or concerns .    Procedure: After verbal consent, excisional debridement was performed on ulcer.  #15 blade was used to debride ulcer down to and including subcutaneous tissue. Bleeding controlled with light pressure.   No drainage noted.  No anesthesia was used due to neuropathy. Dry dressing applied to foot.  Patient  tolerated procedure well.      Rachelle Manriquez DPM, Podiatry/Foot and Ankle Surgery    Weight management plan: Patient was referred to their PCP to discuss a diet and exercise plan.    Recommended to Crispin Rojas to follow up with Primary Care provider regarding elevated blood pressure.

## 2019-12-23 NOTE — LETTER
12/23/2019         RE: Crispin Rojas  [address] [place] [address]        Dear Colleague,    Thank you for referring your patient, Crispin Rojas, to the [facility]. Please see a copy of my visit note below.    Podiatry / Foot and Ankle Surgery Progress Note    December 23, 2019    Subject: Patient was seen for follow up on 4 weeks s/p left 2nd toe amputation. Denies fever, chills, nausa. Notes left foot is good. New ulcer to right foot on the top. Notes it is healing slowly.     Objective:  Vitals: /70   Ht 1.829 m (6')   Wt 109.8 kg (242 lb)   BMI 32.82 kg/m     BMI= Body mass index is 32.82 kg/m .    A1C: 5.9 (10/2019)    General:  Patient is alert and orientated.  NAD  Vascular:  DP and PT pulses are palpable.  No varicosities noted but edema noted.   CFT's < 3secs.  Skin temp is normal  Neuro:  Light and gross touch sensation absent to feet.   Derm:  New ulceration to the dorsal right midfoot. Measures 0.6cm x 0.4cm x 0.2cm. no redness, purulent drainage or signs of infection. Base of wound is granular.   Musculoskeletal: Left 1st and 2nd toe now amptuated.  partial amputations of the right 1st and 4th toes. Right 2,3 toes and left toes 3-5 are semi rigidly contracted.     Imaging: right foot xray - I personally reviewed the xrays.  No evidence of bone infection. Small little bone spur noted to area of dorsal ulcer.     Assessment:    Diabetic polyneuropathy associated with type 2 diabetes mellitus (H)  Diabetic ulcer of other part of right foot associated with type 2 diabetes mellitus, with fat layer exposed (H)  Amputation of left great toe (H)  Amputation of right great toe (H)  Hammertoe, bilateral    Plan:  Ordered xrays. Wound debrided. Recommend santyl to ulcer daily. Recommend boot at home. Follow up in one month. Currently no acute signs of infection. was told to call with questions or concerns .    Procedure: After verbal consent, excisional  debridement was performed on ulcer.  #15 blade was used to debride ulcer down to and including subcutaneous tissue. Bleeding controlled with light pressure.   No drainage noted.  No anesthesia was used due to neuropathy. Dry dressing applied to foot.  Patient tolerated procedure well.      Rachelle Manriquez DPM, Podiatry/Foot and Ankle Surgery    Weight management plan: Patient was referred to their PCP to discuss a diet and exercise plan.    Recommended to Crispin Rojas to follow up with Primary Care provider regarding elevated blood pressure.      Again, thank you for allowing me to participate in the care of your patient.        Sincerely,        Rachelle Manriquez DPM, Podiatry/Foot and Ankle Surgery

## 2019-12-23 NOTE — PATIENT INSTRUCTIONS
Thank you for choosing [facility] Podiatry / Foot & Ankle Surgery!    DR. GARCIA'S CLINIC SCHEDULE  MONDAY AM - [place] TUESDAY - [place]   [address] [address] [place] [address] [place] [address]   [phone] / [phone] / [phone]       WEDNESDAY - [place] FRIDAY AM - WOUND CENTER   [address]   [place] [address] [place] [address]   [phone] / [phone]       FRIDAY PM - [place] SCHEDULE SURGERY: [phone]   [address] BILLING QUESTIONS: [phone]   [place] [address] AFTER HOURS: [phone]   [phone] / [phone] APPOINTMENTS: [phone]     Consumer Price Line (CPL) [phone]     FOLLOW UP: 1 month    FOOT ULCER (WOUND) EDUCATION  Ulceration ofthe foot involves a break or hole in the skin. Skin is our best protection against infection. Skin is quite durable, however, the underlying tissues are fragile. For this reason, the wound is likely to deepen rapidly. Deep wounds usually get infected and require amputation. Prompt healing is therefore essential to avoid limb loss.     Foot ulcers do not heal without intervention. Walking on the foot and living your normal life is not typically compatible with healing the sore. Successful healing will require several months of significant alteration of your daily activities.   Ulcer complications frequently develop. This primarily includes infection of skin, which then spreads deep into your joints, bones and tendons. Spreading infection may travel up your leg and into other parts of your body. Deep infection is usually treated with amputation ofpart ofyour foot or your leg. Signs of infection include fever, chills, nausea, vomiting, erratic blood sugars, local redness, pus, strong odor and localized warmth. Signs of infection should be taken seriously. Prompt evaluation in the clinic or hospital emergency room is  required.   Ulcer treatment requires debridement or surgical removal of devitalized tissue. Your doctor will trim away callused, moistened, unhealthy tissue from the wound surface and margin. This helps to clean the wound and allows proper inspection. Debridement also stimulates healing even though the wound originally appears larger. Expect some bleeding with each debridement. You will be given instruction regarding wound bandaging. This often includes ointment and gauze. Avoid tape directly on the skin. Hand washing is essential since most infections will come from your fingertips. Ulcer care requires a no touch technique. Your fingers should not touch the margin or base of the wound.    HELPFUL HEALING TIPS:  1. Debridement: Getting rid of bad tissue makes way for good tissue to promote healing  2. Addressing Foot Deformities: Hammertoes and bunions can cause increased pressure  3. Pressure Reduction: If pressure remains to the wound, it won't heal  4. Good Pulses: If bloodflow is not getting to the foot, the ulcer will not heal  5. Good Nutrition: If you are not getting proper nutrition your body can't heal.Protein!  6. Infection Control: Keep the ulcer clean with wound cleanser. DO NOT SOAK IT!  7. Moisture Control: Keep edema down and make sure that drainage is getting pulled away from the ulcer    IMPORTANCE OF DEBRIDEMENT    Reduces bioburden to help control or reduce infection. Even if an ulcer is not  infected,  the bacterial bioburden causes increased local inflammation.    Allows more accurate visualization of the wound base and edges, which allows for more precise staging.    Removes necrotic/non-viable tissue, which impedes wound healing, causes protein loss and can be a nidus for infection.    Stimulates new circulation (angiogenesis) and allows adequate oxygen delivery to the wound.    Removes undermining and tunneling, and may help reduce abscess formation.    Releases healing growth factors at the  "edge of the wound.    Prepares the wound bed by leaving only tissues that are capable of regenerating.      DIABETES AND YOUR FEET  Diabetes can result in several problems in the feet including ulcers (open sores) and amputations. Two of the most important reasons why people develop foot problems when they have diabetes is : 1. Neuropathy (loss of feeling)  2. Vascular disease (loss or decrease of blood flow).    Neuropathy is a term used to describe a loss of nerve function.  Patients with diabetes are at risk of developing neuropathy if their sugars continue to run high and are above the normal value. One theory for neuropathy is that the \"extra\" sugar in the body enters the nerves and is broken down. These by-products build up in the nerve causing it to swell and impairing nerve function. Often times, this can be prevented by controlling your sugars, dieting and exercise.    When a person develops neuropathy, they usually begin to feel numbness or tingling in their feet and sometime in their legs.  Other symptoms may include painful burning or hot feet, tingling or feeling like insects or ants are crawling on your feet or legs.  If the diabetes is sever and the sugars run high for long periods of time, neuropathy can also occur in the hands.    Vascular disease  is a term used to describe a loss or decrease in circulation (blood flow). There is a problem in getting blood and oxygen to areas that need it. Similar to neuropathy, sugars can build up in the walls of the arteries (blood vessels) and cause them to become swollen, thickened and hardened. This decreases the amount of blood that can go to an area that needs it. Though this is common in the legs of diabetic patients, it can also affect other arteries (blood vessels) in the body such as in the heart and eyes.    In the legs, vascular disease usually results in cramping. Patients who develop leg cramps after walking the same distance every time (i.e. One " block, half a mile, ect.) need to let their doctors know so that their circulation may be checked. Cramps causing severe pain in the feet and/or legs while sleeping and the cramps go away when you stand or hang your legs off the side of the bed, may also be a sign of poor blood circulation.  Occasional cramping in cold weather or on rare occasions with activity may not be due to poor circulation, but you should inform your doctor.    PREVENTION OF THESE DISEASES  The key to prevention is good blood sugar control. Poor blood sugar control is a big reason many of these problems start. Physical activity (exercise) is a very good way to help decrease your blood sugars. Exercise can lower your blood sugar, blood pressure, and cholesterol. It also reduces your risk for heart disease and stroke, relieves stress, and strengthens your heart, muscles and bones.  In addition, regular activity helps insulin work better, improves your blood circulation, and keeps your joints flexible. If you're trying to lose weight, a combination of exercise and wise food choices can help you reach your target weight and maintain it.      PAIN MANAGEMENT  1.Blood Sugar Control - Most important  2. Medications such as:  Amytriptylline, duloxetine, gabapentin, lyrica, tramadol  3. Nutritional therapy:  Vitamin B6 (100mg daily), Vitamin B12 (75mcg daily), Vitamin D 2000 IU daily), Alpha-Lipoic Acid (600-1800mg daily), Acetyl-L-Carnitine (500-1000mg TID, L-methyl folate (1500mcg daily)    ** Metformin can block Vitamin B6 and B12 so it is important to supplement**    FOOT CARE RECOMMENDATIONS   1. Wash your feet with lukewarm water and a mild soap and then dry them thoroughly, especially between the toes.     2. Examine your feet daily looking for cuts, corns, blisters, cracks, ect, especially after wearing new shoes. Make sure to look between your toes. If you cannot see the bottom of your feet, set a mirror on the floor and hold your foot over  it, or ask a spouse, friend or family member to examine your feet for you. Contact your doctor immediately if new problems are noted or if sores are not healing.     3. Immediately apply moisturizer to the tops and bottoms of your feet, avoiding areas between the toes. Hand lotion (Intesive Care, Candie, Eucerin, Neutrogena, Curel, ect) is sufficient unless your doctor prescribes a medicated lotion. Apply sunscreen to your feet when going swimming outside.     4. Use clean comfortable shoes, wear white socks (if you have any bleeding or drainage, you will see it on white socks). Socks should not have thick seams or cut off the circulation around the leg. Break in new shoes slowly and rotate with older shoes until broken in. Check the inside of your shoes with your hand to look for areas of irritation or objects that may have fallen into your shoes.       5. Keep slippers by the side of your bed for use during the night.     6.  Shoes should be fitted by a professional and should not cause areas of irritation.  Check your feet regularly when wearing a new pair of shoes and replace them as needed.     7.  Talk to your doctor about proper exercise. Exercise and stretching stimulate blood flow to your feet and maintain proper glucose levels.     8.  Monitor your blood glucose level as instructed by your doctor. Notify your doctor immediately if your blood sugar is abnormally high or low.    9. Cut your nails straight across, but then gently round any sharp edges with a cardboard nail file. If you have neuropathy, peripheral vascular disease or cannot see that well to trim your own toenails contact [facility] [employer] ([phone]) or [facility] [employer] ([phone]).      THINGS TO AVOID DOING   1.  Do not soak your feet if you have an open sore. Use only lukewarm water and always check the temperature with your hand as hot water can easily burn your feet.       2.  Never use a hot water bottle or heating pad on your feet. Also do  not apply cold compresses to your feet. With decreased sensation, you could burn or freeze your feet.       3.  Do not apply any of these to your feet:    -  Over the counter medicine for corns or warts    -  Harsh chemicals like boric acid    -  Do not self-treat corns, cuts, blisters or infections. Always consult your doctor.       4.  Do not wear sandals, slippers or walk barefoot, especially on hot sand or concrete or other harsh surfaces.     5.  If you smoke, stop!!!            BODY WEIGHT AND YOUR FEET  The following information is included in the after visit summary for all patients. Body weight can be a sensitive issue to discuss in clinic, but we think the following information is very important. Although we focus on the feet and ankles, we do support the overall health of our patients.     Many things can cause foot and ankle problems. Foot structure, activity level, foot mechanics and injuries are common causes of pain. One very important issue that often goes unmentioned, is body weight. Extra weight can cause increased stress on muscles, ligaments, bones and tendons. Sometimes just a few extra pounds is all it takes to put one over her/his threshold. Without reducing that stress, it can be difficult to alleviate pain. As Foot & Ankle specialists, our job is addressing the lower extremity problem and possible causes. Regarding extra body weight, we encourage patients to discuss diet and weight management plans with their primary care doctors. It is this team approach that gives you the best opportunity for pain relief and getting you back on your feet.      [facility] has a Comprehensive Weight Management Program. This program includes counseling, education, non-surgical and surgical approaches to weight loss. If you are interested in learning more either talk to you primary care provider or call [phone].

## 2019-12-24 ENCOUNTER — TELEPHONE (OUTPATIENT)
Dept: PODIATRY | Facility: CLINIC | Age: 59
End: 2019-12-24

## 2019-12-24 ENCOUNTER — TELEPHONE (OUTPATIENT)
Dept: FAMILY MEDICINE | Facility: CLINIC | Age: 59
End: 2019-12-24

## 2019-12-24 NOTE — TELEPHONE ENCOUNTER
Talked to patient and went over the providers recommendations. Patient scheduled a follow up appointment.     Gerard Norton MA on 12/24/2019 at 10:43 AM

## 2019-12-24 NOTE — TELEPHONE ENCOUNTER
Left message with direct line for hospital follow up from 12/20 ED encounter  Ivette Steven, BSN, RN, PHN     No pertinent family history in first degree relatives

## 2019-12-24 NOTE — TELEPHONE ENCOUNTER
Left nondescript message regarding the providers recommendation.     Gerard Norton MA on 12/24/2019 at 8:48 AM

## 2019-12-27 ENCOUNTER — TELEPHONE (OUTPATIENT)
Dept: PODIATRY | Facility: CLINIC | Age: 59
End: 2019-12-27

## 2019-12-27 NOTE — TELEPHONE ENCOUNTER
Received voicemail from [facility] wanting clarification on Santyl ointment and wound.     Phone call to Yumiko Hagan. He asks for wound measurements. The following was given: 0.6cm x 0.4cm x 0.2cm  They wanted to make sure 30g was sufficient for a 30 days supply and he states it is. They will fill prescription.     AMMON Ceron RN

## 2020-01-07 NOTE — DISCHARGE SUMMARY
[facility]  Discharge Summary  Hospitalist      Date of Admission:  11/7/2019  Date of Discharge:  11/10/2019  Provider:  Jovany Kerr MD. [facility]  Date of Service (when I last saw the patient): 11/10/19      Primary Provider: Aden Lopez          Discharge Diagnosis:     Discharge Diagnoses   Osteomyelitis of left second toe s/p amputation  Type 2 diabetes mellitus  Hypertension  Diabetic neuropathy  Hyperlipidemia      Other medical issues:  Past Medical History:   Diagnosis Date     Cerebral infarction (H)      Chronic infection      H/O heartburn      History of heartburn      Hypertension      Numbness and tingling      Obesity      GILA (obstructive sleep apnea) 10/22/2018     Renal stone      Type II or unspecified type diabetes mellitus without mention of complication, not stated as uncontrolled          Please see the admission history and physical for full details.     Hospital Course     Crispin Rojas was admitted on 11/7/2019.  The following problems were addressed during his hospitalization:  59 year old male with a history of insulin-dependent type 2 diabetes, hypertension, history of osteomyelitis requiring toe amputation, who is admitted to the hospitalist service for management of osteomyelitis.      Osteomyelitis of left second toe  Patient had chronic non-healing wound of left second toe. Presented on 10/31 with redness/cellulitis in that area. MRI on 10/31 confirmed osteomyelitis of that toe. Seen in podiatry clinic on day of admission who recommended admission to the hospital for IV antibiotics prior to surgery.- blood cultures are no growth to date  - Vancomycin and Zosyn empirically   Dr. GARCIA did the amputation on 11/8/2019, without complication, she recommended discharge home with 10 days of Keflex, return to the clinic for follow-up after 10 days, keep dressing and foot the dry, minimal weightbearing in postop boot, per podiatrist all infected bone has been  removed.    Pending Results   Unresulted Labs Ordered in the Past 30 Days of this Admission     Date and Time Order Name Status Description    11/8/2019 1808 Surgical pathology exam In process     11/7/2019 1724 Blood culture Preliminary     11/7/2019 1724 Blood culture Preliminary           Discharge Orders      Follow-up and recommended labs and tests     Follow up with Dr. Manriquez in 2 weeks from discharge from the hospital.     For APPOINTMENTS: [phone].  For questions or concerns: call: [phone]     Activity    Your activity upon discharge: minimal heel weight bearing left foot in boot.     Wound care and dressings    Instructions to care for your wound at home: Keep left foot and dressing dry. Bag up if you shower. Will change at first clinic visit.     Reason for your hospital stay    Osteomyelitis status post amputation     Follow-up and recommended labs and tests     Follow-up with podiatry Dr. Manriquez in 10 days     Full Code     Diet    Follow this diet upon discharge: Orders Placed This Encounter      Regular Diet Adult       Code Status   Full Code       Primary Care Physician   Aden Lopez    Physical Exam   Temp: 97.2  F (36.2  C) Temp src: Oral BP: (!) 146/67 Pulse: 50 Heart Rate: 58 Resp: 12 SpO2: 94 % O2 Device: None (Room air)    Vitals:    11/07/19 1734   Weight: 108 kg (238 lb)     Vital Signs with Ranges  Temp:  [96.6  F (35.9  C)-97.2  F (36.2  C)] 97.2  F (36.2  C)  Pulse:  [50] 50  Heart Rate:  [52-64] 58  Resp:  [12-20] 12  BP: (133-149)/(64-72) 146/67  SpO2:  [94 %-95 %] 94 %  I/O last 3 completed shifts:  In: 2620 [P.O.:1790; I.V.:830]  Out: -     Constitutional:  alert, cooperative, no apparent distress  Respiratory: No increased work of breathing, good air exchange, no crackles or wheezing.  Cardiovascular: apical impulse,normal S1 and S2  GI: bowel sounds present, soft, non-distended, non-tender      Discharge Disposition   Discharged to home    Consultations This Hospital  Stay   PHARMACY TO [facility]  CARE COORDINATOR IP CONSULT  PHYSICAL THERAPY ADULT IP CONSULT  ORTHOSIS EXTREMITY LOWER REFERRAL IP CONSULT    Time Spent on this Encounter   I, Jovany Kerr MD, personally saw the patient today and spent less than or equal to 30 minutes discharging this patient.      Discharge Medications   Current Discharge Medication List      START taking these medications    Details   cephALEXin (KEFLEX) 500 MG capsule Take 1 capsule (500 mg) by mouth 2 times daily for 10 days  Qty: 20 capsule, Refills: 0    Associated Diagnoses: Diabetic ulcer of lower extremity (H)         CONTINUE these medications which have NOT CHANGED    Details   amLODIPine (NORVASC) 5 MG tablet TAKE 1 TABLET BY MOUTH DAILY  Qty: 90 tablet, Refills: 0    Associated Diagnoses: Other specified transient cerebral ischemias      aspirin (ASA) 325 MG EC tablet Take 325 mg by mouth daily      atorvastatin (LIPITOR) 40 MG tablet TAKE 1 TABLET BY MOUTH EVERY DAY  Qty: 90 tablet, Refills: 2    Comments: Duplicate, see 3/6/19 rx sent, PLEASE UPDATE  Associated Diagnoses: 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); Hyperlipidemia LDL goal <100      Cholecalciferol (VITAMIN D3 PO) Take 1,000 Units by mouth daily       CIALIS 5 MG tablet TAKE 1 TABLET BY MOUTH EVERY DAY AS NEEDED  Qty: 30 tablet, Refills: 0    Associated Diagnoses: Erectile dysfunction due to diseases classified elsewhere      gabapentin (NEURONTIN) 100 MG capsule Take 300 mg by mouth 3 times daily      insulin degludec (TRESIBA FLEXTOUCH) 100 UNIT/ML pen Inject 55 Units Subcutaneous daily  Qty: 15 mL, Refills: 11    Associated Diagnoses: Type 2 diabetes mellitus with diabetic peripheral angiopathy and gangrene, with long-term current use of insulin (H)      insulin lispro (HUMALOG PEN) 100 UNIT/ML (1 unit dial) pen 1 units per 9 grams of carbohydrate before each meal + 1:16 over 150 correction.  TDD 70 units  Qty: 45 mL, Refills: 3     Associated Diagnoses: 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); Type 2 diabetes mellitus with diabetic peripheral angiopathy and gangrene, with long-term current use of insulin (H)      !! ketoconazole (NIZORAL) 2 % external shampoo APPLY EXTERNALLY 2 TIMES A WEEK AS DIRECTED  Qty: 120 mL, Refills: 3    Associated Diagnoses: Seborrheic dermatitis      !! ketoconazole (NIZORAL) 2 % external shampoo Apply topically daily as needed , apply daily and wash off after 5 min      lisinopril (PRINIVIL/ZESTRIL) 40 MG tablet Take 1 tablet (40 mg) by mouth daily  Qty: 90 tablet, Refills: 1    Comments: Replacing the 20 mg  Associated Diagnoses: Benign essential hypertension      MAGNESIUM GLYCINATE PLUS PO Take 400 mg by mouth 2 times daily      metFORMIN (GLUCOPHAGE-XR) 500 MG 24 hr tablet TAKE 2 TABLETS BY MOUTH TWICE DAILY WITH MEALS  Qty: 360 tablet, Refills: 3    Associated Diagnoses: 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)      Multiple Vitamins-Minerals (MULTIVITAMIN PO) Take 1 tablet by mouth daily       Omega-3 Fatty Acids (OMEGA-3 FISH OIL PO) Take 1 g by mouth 2 times daily (with meals)       semaglutide (OZEMPIC, 1 MG/DOSE,) 2 MG/1.5ML pen Inject 1 mg Subcutaneous every 7 days , on Wednesdays  Qty: 2 pen, Refills: 11    Associated Diagnoses: Type 2 diabetes mellitus with diabetic peripheral angiopathy and gangrene, with long-term current use of insulin (H)      blood glucose (NO BRAND SPECIFIED) lancets standard Use to test blood sugar 3 times daily or as directed.  Qty: 300 each, Refills: 3    Associated Diagnoses: Type 2 diabetes mellitus with diabetic neuropathy, with long-term current use of insulin (H)      blood glucose monitoring (NO BRAND SPECIFIED) meter device kit Use to test blood sugar 3 times daily or as directed.  Qty: 1 kit, Refills: 0    Associated Diagnoses: 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)      blood glucose monitoring (NO  BRAND SPECIFIED) test strip Use to test blood sugars 3 times daily or as directed  Qty: 300 strip, Refills: 3    Associated Diagnoses: 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)      Co-Enzyme Q10 100 MG CAPS Take 100 mg by mouth daily       Continuous Blood Gluc Sensor (FREESTYLE LUCERO 14 DAY SENSOR) MISC 1 each every 14 days  Qty: 2 each, Refills: 11    Associated Diagnoses: Type 2 diabetes mellitus with peripheral neuropathy (H); Type 2 diabetes mellitus with diabetic peripheral angiopathy and gangrene, with long-term current use of insulin (H)      Insulin Pen Needle (PEN NEEDLES) 31G X 5 MM MISC 1 Device 5 times daily , TWICE DAILY WITH LANTUS AND 3 TIMES A DAY PRN WITH NOVOLOG FLEXPEN  Qty: 200 each, Refills: 11    Associated Diagnoses: Type 2 diabetes mellitus with peripheral neuropathy (H)       !! - Potential duplicate medications found. Please discuss with provider.      STOP taking these medications       ciprofloxacin (CIPRO) 500 MG tablet Comments:   Reason for Stopping:             Allergies   Allergies   Allergen Reactions     Niacin Swelling     SIMCOR caused edema in extremities     Simvastatin Swelling     SIMCOR caused edema in extremities     Data   Most Recent 3 CBC's:  Recent Labs   Lab Test 11/10/19  0720 11/08/19  0816 11/07/19  1817   WBC 5.4 5.0 5.4   HGB 12.0* 12.3* 11.5*   MCV 90 90 90   * 155 168      Most Recent 3 BMP's:  Recent Labs   Lab Test 11/10/19  0720 11/09/19  0752 11/08/19  0816    141 140   POTASSIUM 4.0 4.3 4.3   CHLORIDE 111* 110* 109   CO2 25 23 26   BUN 18 20 18   CR 0.97 0.93 0.90   ANIONGAP 5 8 5   NARA 8.5 8.5 8.7   * 155* 191*     Most Recent 2 LFT's:  Recent Labs   Lab Test 11/07/19  1817 07/07/19  0353   AST 27 43   ALT 42 50   ALKPHOS 48 57   BILITOTAL 0.4 0.7     Most Recent INR's and Anticoagulation Dosing History:  Anticoagulation Dose History     Recent Dosing and Labs Latest Ref Rng & Units 8/19/2013 11/7/2019    INR 0.86  - 1.14 1.04 1.08        Most Recent 3 Troponin's:  Recent Labs   Lab Test 07/07/19  0353 11/22/17  0605   TROPI <0.015 <0.015     Most Recent Cholesterol Panel:  Recent Labs   Lab Test 10/21/19  1700   CHOL 109   LDL 55   HDL 38*   TRIG 78     Most Recent 6 Bacteria Isolates From Any Culture (See EPIC Reports for Culture Details):  Recent Labs   Lab Test 11/07/19  1829 11/07/19  1816 10/31/19  0229 10/31/19  0213 08/02/19  1714 06/24/19 2002   CULT No growth after 3 days No growth after 3 days No growth No growth No anaerobes isolated  Light growth  Streptococcus dysgalactiae serogroup C/G  *  Light growth  Staphylococcus aureus  * No growth     Most Recent TSH, T4 and A1c Labs:  Recent Labs   Lab Test 10/21/19  1700  12/27/17  1001   TSH  --   --  1.78   A1C 5.9*   < > 9.6*    < > = values in this interval not displayed.     Results for orders placed or performed during the hospital encounter of 11/07/19   X-ray lt Foot 3 vw port: In PACU    Narrative    EXAM: XR FOOT PORT LT 3 VW  LOCATION: [facility]  DATE/TIME: 11/8/2019 6:40 PM    INDICATION: Postoperative.  COMPARISON: None.      Impression    IMPRESSION: Amputations of the first and second toes at the MTP joints. No opaque foreign body. Hammertoe deformities. No acute fracture.           Disclaimer: This note consists of symbols derived from keyboarding, dictation and/or voice recognition software. As a result, there may be errors in the script that have gone undetected. Please consider this when interpreting information found in this chart.     normal...

## 2020-01-27 ENCOUNTER — OFFICE VISIT (OUTPATIENT)
Dept: PODIATRY | Facility: CLINIC | Age: 60
End: 2020-01-27
Payer: COMMERCIAL

## 2020-01-27 VITALS
SYSTOLIC BLOOD PRESSURE: 136 MMHG | HEIGHT: 72 IN | BODY MASS INDEX: 32.78 KG/M2 | WEIGHT: 242 LBS | DIASTOLIC BLOOD PRESSURE: 80 MMHG

## 2020-01-27 DIAGNOSIS — E11.42 DIABETIC POLYNEUROPATHY ASSOCIATED WITH TYPE 2 DIABETES MELLITUS (H): Primary | ICD-10-CM

## 2020-01-27 DIAGNOSIS — M20.42 HAMMER TOES OF BOTH FEET: ICD-10-CM

## 2020-01-27 DIAGNOSIS — S98.112A AMPUTATION OF LEFT GREAT TOE (H): ICD-10-CM

## 2020-01-27 DIAGNOSIS — M20.41 HAMMER TOES OF BOTH FEET: ICD-10-CM

## 2020-01-27 DIAGNOSIS — Z87.2 HEALED FOOT ULCER: ICD-10-CM

## 2020-01-27 DIAGNOSIS — N52.1 ERECTILE DYSFUNCTION DUE TO DISEASES CLASSIFIED ELSEWHERE: ICD-10-CM

## 2020-01-27 DIAGNOSIS — S98.111A AMPUTATION OF RIGHT GREAT TOE (H): ICD-10-CM

## 2020-01-27 PROCEDURE — 99213 OFFICE O/P EST LOW 20 MIN: CPT | Mod: 24 | Performed by: PODIATRIST

## 2020-01-27 ASSESSMENT — MIFFLIN-ST. JEOR: SCORE: 1960.7

## 2020-01-27 NOTE — PROGRESS NOTES
Podiatry / Foot and Ankle Surgery Progress Note    January 27, 2020    Subject: Patient was seen for follow up on ulcer left foot. Notes that it has not drained for last week. Denies fever, chills, nausa. No concerns today.     Objective:  Vitals: Ht 1.829 m (6')   Wt 109.8 kg (242 lb)   BMI 32.82 kg/m    BMI= Body mass index is 32.82 kg/m .    A1C: 5.9 (10/2019)    General:  Patient is alert and orientated.  NAD  Vascular:  DP and PT pulses are palpable.  No varicosities noted but edema noted.   CFT's < 3secs.  Skin temp is normal  Neuro:  Light and gross touch sensation absent to feet.   Derm:  New ulceration to the dorsal right midfoot is healed. No open lesions or signs of infection.   Musculoskeletal: Left 1st and 2nd toe now amptuated. partial amputations of the right 1st and 4th toes. Right 2,3 toes and left toes 3-5 are semi rigidly contracted.      Imaging: right foot xray - I personally reviewed the xrays.  No evidence of bone infection. Small little bone spur noted to area of dorsal ulcer.      Assessment:     Diabetic polyneuropathy associated with type 2 diabetes mellitus (H)  Healed foot ulcer  Amputation of left great toe (H)  Amputation of right great toe (H)  Hammer toes of both feet     Plan:  reviewed xray. Talked about small possible spur to ulcer area. Wound is healed so will monitor at this point but discussed that if it recurs, would recommend surgical removal of spur.       Rachelle Manriquez DPM, Podiatry/Foot and Ankle Surgery    Weight management plan: Patient was referred to their PCP to discuss a diet and exercise plan.    Recommended to Crispin Rojas to follow up with Primary Care provider regarding elevated blood pressure.

## 2020-01-27 NOTE — PATIENT INSTRUCTIONS
"Thank you for choosing [facility] Podiatry / Foot & Ankle Surgery!    DR. GARCIA'S CLINIC SCHEDULE  MONDAY AM - [place] TUESDAY - [place]   [address] [address] [place] [address] [place] [address]   [phone] / [phone] / [phone]       WEDNESDAY - [place] FRIDAY AM - [facility]   [address]   [place] [address] [place] [address]   [phone] / [phone]       FRIDAY PM - [place] SCHEDULE SURGERY: [phone] [address] BILLING QUESTIONS: [phone]   [place] [address] AFTER HOURS: [phone]   [phone] / [phone] APPOINTMENTS: [phone]     Consumer Price Line (CPL) [phone]         FOLLOW UP:  As needed        DIABETES AND YOUR FEET  Diabetes can result in several problems in the feet including ulcers (open sores) and amputations. Two of the most important reasons why people develop foot problems when they have diabetes is : 1. Neuropathy (loss of feeling)  2. Vascular disease (loss or decrease of blood flow).    Neuropathy is a term used to describe a loss of nerve function.  Patients with diabetes are at risk of developing neuropathy if their sugars continue to run high and are above the normal value. One theory for neuropathy is that the \"extra\" sugar in the body enters the nerves and is broken down. These by-products build up in the nerve causing it to swell and impairing nerve function. Often times, this can be prevented by controlling your sugars, dieting and exercise.    When a person develops neuropathy, they usually begin to feel numbness or tingling in their feet and sometime in their legs.  Other symptoms may include painful burning or hot feet, tingling or feeling like insects or ants are crawling on your feet or legs.  If the diabetes is sever and the sugars run high for long periods of time, neuropathy can also occur in the hands.    Vascular " disease  is a term used to describe a loss or decrease in circulation (blood flow). There is a problem in getting blood and oxygen to areas that need it. Similar to neuropathy, sugars can build up in the walls of the arteries (blood vessels) and cause them to become swollen, thickened and hardened. This decreases the amount of blood that can go to an area that needs it. Though this is common in the legs of diabetic patients, it can also affect other arteries (blood vessels) in the body such as in the heart and eyes.    In the legs, vascular disease usually results in cramping. Patients who develop leg cramps after walking the same distance every time (i.e. One block, half a mile, ect.) need to let their doctors know so that their circulation may be checked. Cramps causing severe pain in the feet and/or legs while sleeping and the cramps go away when you stand or hang your legs off the side of the bed, may also be a sign of poor blood circulation.  Occasional cramping in cold weather or on rare occasions with activity may not be due to poor circulation, but you should inform your doctor.    PREVENTION OF THESE DISEASES  The key to prevention is good blood sugar control. Poor blood sugar control is a big reason many of these problems start. Physical activity (exercise) is a very good way to help decrease your blood sugars. Exercise can lower your blood sugar, blood pressure, and cholesterol. It also reduces your risk for heart disease and stroke, relieves stress, and strengthens your heart, muscles and bones.  In addition, regular activity helps insulin work better, improves your blood circulation, and keeps your joints flexible. If you're trying to lose weight, a combination of exercise and wise food choices can help you reach your target weight and maintain it.      PAIN MANAGEMENT  1.Blood Sugar Control - Most important  2. Medications such as:  Amytriptylline, duloxetine, gabapentin, lyrica, tramadol  3.  Nutritional therapy:  Vitamin B6 (100mg daily), Vitamin B12 (75mcg daily), Vitamin D 2000 IU daily), Alpha-Lipoic Acid (600-1800mg daily), Acetyl-L-Carnitine (500-1000mg TID, L-methyl folate (1500mcg daily)    ** Metformin can block Vitamin B6 and B12 so it is important to supplement**    FOOT CARE RECOMMENDATIONS   1. Wash your feet with lukewarm water and a mild soap and then dry them thoroughly, especially between the toes.     2. Examine your feet daily looking for cuts, corns, blisters, cracks, ect, especially after wearing new shoes. Make sure to look between your toes. If you cannot see the bottom of your feet, set a mirror on the floor and hold your foot over it, or ask a spouse, friend or family member to examine your feet for you. Contact your doctor immediately if new problems are noted or if sores are not healing.     3. Immediately apply moisturizer to the tops and bottoms of your feet, avoiding areas between the toes. Hand lotion (Intesive Care, Candie, Eucerin, Neutrogena, Curel, ect) is sufficient unless your doctor prescribes a medicated lotion. Apply sunscreen to your feet when going swimming outside.     4. Use clean comfortable shoes, wear white socks (if you have any bleeding or drainage, you will see it on white socks). Socks should not have thick seams or cut off the circulation around the leg. Break in new shoes slowly and rotate with older shoes until broken in. Check the inside of your shoes with your hand to look for areas of irritation or objects that may have fallen into your shoes.       5. Keep slippers by the side of your bed for use during the night.     6.  Shoes should be fitted by a professional and should not cause areas of irritation.  Check your feet regularly when wearing a new pair of shoes and replace them as needed.     7.  Talk to your doctor about proper exercise. Exercise and stretching stimulate blood flow to your feet and maintain proper glucose levels.     8.  Monitor  your blood glucose level as instructed by your doctor. Notify your doctor immediately if your blood sugar is abnormally high or low.    9. Cut your nails straight across, but then gently round any sharp edges with a cardboard nail file. If you have neuropathy, peripheral vascular disease or cannot see that well to trim your own toenails contact [facility] ([phone]) or [facility] [employer] ([phone]).      THINGS TO AVOID DOING   1.  Do not soak your feet if you have an open sore. Use only lukewarm water and always check the temperature with your hand as hot water can easily burn your feet.       2.  Never use a hot water bottle or heating pad on your feet. Also do not apply cold compresses to your feet. With decreased sensation, you could burn or freeze your feet.       3.  Do not apply any of these to your feet:    -  Over the counter medicine for corns or warts    -  Harsh chemicals like boric acid    -  Do not self-treat corns, cuts, blisters or infections. Always consult your doctor.       4.  Do not wear sandals, slippers or walk barefoot, especially on hot sand or concrete or other harsh surfaces.     5.  If you smoke, stop!!!                  BODY WEIGHT AND YOUR FEET  The following information is included in the after visit summary for all patients. Body weight can be a sensitive issue to discuss in clinic, but we think the following information is very important. Although we focus on the feet and ankles, we do support the overall health of our patients.     Many things can cause foot and ankle problems. Foot structure, activity level, foot mechanics and injuries are common causes of pain. One very important issue that often goes unmentioned, is body weight. Extra weight can cause increased stress on muscles, ligaments, bones and tendons. Sometimes just a few extra pounds is all it takes to put one over her/his threshold. Without reducing that stress, it can be difficult to alleviate pain. As Foot & Ankle  specialists, our job is addressing the lower extremity problem and possible causes. Regarding extra body weight, we encourage patients to discuss diet and weight management plans with their primary care doctors. It is this team approach that gives you the best opportunity for pain relief and getting you back on your feet.      [facility] has a Comprehensive Weight Management Program. This program includes counseling, education, non-surgical and surgical approaches to weight loss. If you are interested in learning more either talk to you primary care provider or call [phone].

## 2020-01-27 NOTE — LETTER
1/27/2020         RE: Crispin Rojas  [address] [place] [address]        Dear Colleague,    Thank you for referring your patient, Crispin Rojas, to the [facility]. Please see a copy of my visit note below.    Podiatry / Foot and Ankle Surgery Progress Note    January 27, 2020    Subject: Patient was seen for follow up on ulcer left foot. Notes that it has not drained for last week. Denies fever, chills, nausa. No concerns today.     Objective:  Vitals: Ht 1.829 m (6')   Wt 109.8 kg (242 lb)   BMI 32.82 kg/m     BMI= Body mass index is 32.82 kg/m .    A1C: 5.9 (10/2019)    General:  Patient is alert and orientated.  NAD  Vascular:  DP and PT pulses are palpable.  No varicosities noted but edema noted.   CFT's < 3secs.  Skin temp is normal  Neuro:  Light and gross touch sensation absent to feet.   Derm:  New ulceration to the dorsal right midfoot is healed. No open lesions or signs of infection.   Musculoskeletal: Left 1st and 2nd toe now amptuated. partial amputations of the right 1st and 4th toes. Right 2,3 toes and left toes 3-5 are semi rigidly contracted.      Imaging: right foot xray - I personally reviewed the xrays.  No evidence of bone infection. Small little bone spur noted to area of dorsal ulcer.      Assessment:      Diabetic polyneuropathy associated with type 2 diabetes mellitus (H)  Healed foot ulcer  Amputation of left great toe (H)  Amputation of right great toe (H)  Hammer toes of both feet     Plan:   reviewed xray. Talked about small possible spur to ulcer area. Wound is healed so will monitor at this point but discussed that if it recurs, would recommend surgical removal of spur.       Rachelle Manriquez DPM, Podiatry/Foot and Ankle Surgery    Weight management plan: Patient was referred to their PCP to discuss a diet and exercise plan.    Recommended to Crispin Rojas to follow up with Primary Care provider regarding elevated blood pressure.      Again,  thank you for allowing me to participate in the care of your patient.        Sincerely,        Rachelle Manriquez DPM, Podiatry/Foot and Ankle Surgery

## 2020-01-28 RX ORDER — TADALAFIL 5 MG/1
TABLET ORAL
Qty: 30 TABLET | Refills: 1 | Status: SHIPPED | OUTPATIENT
Start: 2020-01-28 | End: 2021-01-04

## 2020-02-03 ENCOUNTER — TRANSFERRED RECORDS (OUTPATIENT)
Dept: HEALTH INFORMATION MANAGEMENT | Facility: CLINIC | Age: 60
End: 2020-02-03

## 2020-02-03 LAB
RETINOPATHY: NEGATIVE
RETINOPATHY: NORMAL

## 2020-02-03 NOTE — PROGRESS NOTES
Pre-Visit Planning     Future Appointments   Date Time Provider Department Center   2/4/2020  9:00 AM Aden Lopez MD [facility]   6/11/2020  3:00 PM David Evans MD [facility]     Arrival Time for this Appointment:  8:35 AM   Appointment Notes for this encounter:   px    Questionnaires Reviewed/Assigned  No additional questionnaires are needed    Patient preferred phone number: [phone]    Unable to reach. Left voicemail. Advised patient to call clinic back at [phone].  Ivette Steven, JOSEN, RN, PHN

## 2020-02-04 ENCOUNTER — OFFICE VISIT (OUTPATIENT)
Dept: ENDOCRINOLOGY | Facility: CLINIC | Age: 60
End: 2020-02-04
Payer: COMMERCIAL

## 2020-02-04 ENCOUNTER — OFFICE VISIT (OUTPATIENT)
Dept: FAMILY MEDICINE | Facility: CLINIC | Age: 60
End: 2020-02-04
Payer: COMMERCIAL

## 2020-02-04 VITALS
DIASTOLIC BLOOD PRESSURE: 80 MMHG | TEMPERATURE: 98 F | BODY MASS INDEX: 33.5 KG/M2 | SYSTOLIC BLOOD PRESSURE: 144 MMHG | WEIGHT: 247 LBS | HEART RATE: 60 BPM

## 2020-02-04 VITALS
TEMPERATURE: 98 F | HEIGHT: 72 IN | WEIGHT: 247 LBS | SYSTOLIC BLOOD PRESSURE: 155 MMHG | HEART RATE: 60 BPM | BODY MASS INDEX: 33.46 KG/M2 | DIASTOLIC BLOOD PRESSURE: 83 MMHG

## 2020-02-04 DIAGNOSIS — E11.40 TYPE 2 DIABETES MELLITUS WITH DIABETIC NEUROPATHY, WITH LONG-TERM CURRENT USE OF INSULIN (H): Primary | ICD-10-CM

## 2020-02-04 DIAGNOSIS — I10 BENIGN ESSENTIAL HYPERTENSION: ICD-10-CM

## 2020-02-04 DIAGNOSIS — R22.33: ICD-10-CM

## 2020-02-04 DIAGNOSIS — E11.42 TYPE 2 DIABETES MELLITUS WITH PERIPHERAL NEUROPATHY (H): Primary | ICD-10-CM

## 2020-02-04 DIAGNOSIS — G45.8 OTHER SPECIFIED TRANSIENT CEREBRAL ISCHEMIAS: ICD-10-CM

## 2020-02-04 DIAGNOSIS — Z79.4 TYPE 2 DIABETES MELLITUS WITH DIABETIC NEUROPATHY, WITH LONG-TERM CURRENT USE OF INSULIN (H): Primary | ICD-10-CM

## 2020-02-04 LAB
CREAT UR-MCNC: 138 MG/DL
HBA1C MFR BLD: 8.8 % (ref 0–5.6)
MICROALBUMIN UR-MCNC: 145 MG/L
MICROALBUMIN/CREAT UR: 105.07 MG/G CR (ref 0–17)

## 2020-02-04 PROCEDURE — 36415 COLL VENOUS BLD VENIPUNCTURE: CPT | Performed by: CLINICAL NURSE SPECIALIST

## 2020-02-04 PROCEDURE — 82043 UR ALBUMIN QUANTITATIVE: CPT | Performed by: CLINICAL NURSE SPECIALIST

## 2020-02-04 PROCEDURE — 83036 HEMOGLOBIN GLYCOSYLATED A1C: CPT | Performed by: CLINICAL NURSE SPECIALIST

## 2020-02-04 PROCEDURE — 80053 COMPREHEN METABOLIC PANEL: CPT | Performed by: FAMILY MEDICINE

## 2020-02-04 PROCEDURE — 99214 OFFICE O/P EST MOD 30 MIN: CPT | Mod: 25 | Performed by: CLINICAL NURSE SPECIALIST

## 2020-02-04 PROCEDURE — 99396 PREV VISIT EST AGE 40-64: CPT | Performed by: FAMILY MEDICINE

## 2020-02-04 RX ORDER — AMLODIPINE BESYLATE 5 MG/1
5 TABLET ORAL DAILY
Qty: 90 TABLET | Refills: 1 | Status: SHIPPED | OUTPATIENT
Start: 2020-02-04 | End: 2020-02-04

## 2020-02-04 RX ORDER — AMLODIPINE BESYLATE 5 MG/1
5 TABLET ORAL 2 TIMES DAILY
Qty: 180 TABLET | Refills: 1 | Status: SHIPPED | OUTPATIENT
Start: 2020-02-04 | End: 2020-07-29

## 2020-02-04 SDOH — SOCIAL STABILITY: SOCIAL NETWORK: HOW OFTEN DO YOU ATTENT MEETINGS OF THE CLUB OR ORGANIZATION YOU BELONG TO?: NEVER

## 2020-02-04 SDOH — SOCIAL STABILITY: SOCIAL NETWORK: HOW OFTEN DO YOU ATTEND CHURCH OR RELIGIOUS SERVICES?: MORE THAN 4 TIMES PER YEAR

## 2020-02-04 SDOH — ECONOMIC STABILITY: FOOD INSECURITY: WITHIN THE PAST 12 MONTHS, YOU WORRIED THAT YOUR FOOD WOULD RUN OUT BEFORE YOU GOT MONEY TO BUY MORE.: NEVER TRUE

## 2020-02-04 SDOH — ECONOMIC STABILITY: FOOD INSECURITY: WITHIN THE PAST 12 MONTHS, THE FOOD YOU BOUGHT JUST DIDN'T LAST AND YOU DIDN'T HAVE MONEY TO GET MORE.: NEVER TRUE

## 2020-02-04 SDOH — SOCIAL STABILITY: SOCIAL NETWORK: ARE YOU MARRIED, WIDOWED, DIVORCED, SEPARATED, NEVER MARRIED, OR LIVING WITH A PARTNER?: MARRIED

## 2020-02-04 SDOH — SOCIAL STABILITY: SOCIAL NETWORK: HOW OFTEN DO YOU GET TOGETHER WITH FRIENDS OR RELATIVES?: NEVER

## 2020-02-04 SDOH — HEALTH STABILITY: MENTAL HEALTH: HOW OFTEN DO YOU HAVE A DRINK CONTAINING ALCOHOL?: 2-4 TIMES A MONTH

## 2020-02-04 SDOH — ECONOMIC STABILITY: TRANSPORTATION INSECURITY
IN THE PAST 12 MONTHS, HAS THE LACK OF TRANSPORTATION KEPT YOU FROM MEDICAL APPOINTMENTS OR FROM GETTING MEDICATIONS?: NO

## 2020-02-04 SDOH — ECONOMIC STABILITY: TRANSPORTATION INSECURITY
IN THE PAST 12 MONTHS, HAS LACK OF TRANSPORTATION KEPT YOU FROM MEETINGS, WORK, OR FROM GETTING THINGS NEEDED FOR DAILY LIVING?: NO

## 2020-02-04 SDOH — SOCIAL STABILITY: SOCIAL NETWORK
DO YOU BELONG TO ANY CLUBS OR ORGANIZATIONS SUCH AS CHURCH GROUPS UNIONS, FRATERNAL OR ATHLETIC GROUPS, OR SCHOOL GROUPS?: NO

## 2020-02-04 SDOH — HEALTH STABILITY: PHYSICAL HEALTH: ON AVERAGE, HOW MANY DAYS PER WEEK DO YOU ENGAGE IN MODERATE TO STRENUOUS EXERCISE (LIKE A BRISK WALK)?: 0 DAYS

## 2020-02-04 SDOH — HEALTH STABILITY: MENTAL HEALTH: HOW MANY STANDARD DRINKS CONTAINING ALCOHOL DO YOU HAVE ON A TYPICAL DAY?: 1 OR 2

## 2020-02-04 SDOH — ECONOMIC STABILITY: INCOME INSECURITY: HOW HARD IS IT FOR YOU TO PAY FOR THE VERY BASICS LIKE FOOD, HOUSING, MEDICAL CARE, AND HEATING?: NOT VERY HARD

## 2020-02-04 SDOH — SOCIAL STABILITY: SOCIAL NETWORK: IN A TYPICAL WEEK, HOW MANY TIMES DO YOU TALK ON THE PHONE WITH FAMILY, FRIENDS, OR NEIGHBORS?: NEVER

## 2020-02-04 SDOH — HEALTH STABILITY: MENTAL HEALTH: HOW OFTEN DO YOU HAVE 6 OR MORE DRINKS ON ONE OCCASION?: NEVER

## 2020-02-04 SDOH — HEALTH STABILITY: PHYSICAL HEALTH: ON AVERAGE, HOW MANY MINUTES DO YOU ENGAGE IN EXERCISE AT THIS LEVEL?: 0 MIN

## 2020-02-04 ASSESSMENT — ENCOUNTER SYMPTOMS
DIARRHEA: 1
ABDOMINAL PAIN: 0
FEVER: 0
JOINT SWELLING: 0
PALPITATIONS: 0
EYE PAIN: 0
ARTHRALGIAS: 0
DIZZINESS: 0
NERVOUS/ANXIOUS: 0
NAUSEA: 1
HEADACHES: 0
MYALGIAS: 0
SORE THROAT: 0
COUGH: 0
HEMATOCHEZIA: 0
HEARTBURN: 0
CHILLS: 0
CONSTIPATION: 0
FREQUENCY: 0
PARESTHESIAS: 0
HEMATURIA: 0
DYSURIA: 0
WEAKNESS: 0
SHORTNESS OF BREATH: 0

## 2020-02-04 ASSESSMENT — ANXIETY QUESTIONNAIRES
7. FEELING AFRAID AS IF SOMETHING AWFUL MIGHT HAPPEN: NOT AT ALL
4. TROUBLE RELAXING: NOT AT ALL
7. FEELING AFRAID AS IF SOMETHING AWFUL MIGHT HAPPEN: NOT AT ALL
GAD7 TOTAL SCORE: 0
1. FEELING NERVOUS, ANXIOUS, OR ON EDGE: NOT AT ALL
3. WORRYING TOO MUCH ABOUT DIFFERENT THINGS: NOT AT ALL
2. NOT BEING ABLE TO STOP OR CONTROL WORRYING: NOT AT ALL
GAD7 TOTAL SCORE: 0
5. BEING SO RESTLESS THAT IT IS HARD TO SIT STILL: NOT AT ALL
6. BECOMING EASILY ANNOYED OR IRRITABLE: NOT AT ALL
GAD7 TOTAL SCORE: 0

## 2020-02-04 ASSESSMENT — PATIENT HEALTH QUESTIONNAIRE - PHQ9
SUM OF ALL RESPONSES TO PHQ QUESTIONS 1-9: 0
10. IF YOU CHECKED OFF ANY PROBLEMS, HOW DIFFICULT HAVE THESE PROBLEMS MADE IT FOR YOU TO DO YOUR WORK, TAKE CARE OF THINGS AT HOME, OR GET ALONG WITH OTHER PEOPLE: NOT DIFFICULT AT ALL
SUM OF ALL RESPONSES TO PHQ QUESTIONS 1-9: 0

## 2020-02-04 ASSESSMENT — MIFFLIN-ST. JEOR: SCORE: 1983.38

## 2020-02-04 NOTE — PROGRESS NOTES
"Name: Crispin Rojas  Seen today for Diabetes (Last seen 10/24/2019).  HPI:  Crispin Rojas is a 59 year old male who presents for the management of:    1. Type 2 DM:  Originally diagnosed at the age of: 1997.  Insulin treated 15+ years.    Current Regimen: Metformin 1000 mg bid, Ozempic 1 mg weekly , Tresiba 55 units q am,  Humalog 1:9 .  Has not been correcting his blood sugar unless it is >200.    Started Bydureon 5/2018 - tolerated well.    Changed to Ozempic 6/2018 - feels Ozempic is working better  Using personal CGM - Corwin  Hospitalized 6/2019 for cellulitis and bilateral diabetic foot ulcers.  Healed now.    BS checks: 3-5 times per day, + Corwin  Meter Download: didn't bring meter or Corwin reader.  Corwin CGM download:     Previously discussed insulin pump therapy , not interested in pump therapy at this time.    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   He stopped am Humalog on work days and this helped decrease low blood sugars.    Has been traveling a great deal - eating out a lot, difficult to count carbs in restaurant food.  Says Corwin shows most blood sugars under 200.  Tends to \"under dose\" Humalog if unsure of carbs in the meal.    Complications:   Diabetes Complications  Description / Detail    Diabetic Retinopathy   + diabetic retinopathy, last exam 6/2019, also sees  retinal specialist annually   CAD / PAD  No   Neuropathy   Yes, Left 1st and 2nd toe amptuated. partial amputations of the right 1st and 4th toes   Nephropathy / Microalbuminuria  Yes, elevated microalbumin   Gastroparesis  No   Hypoglycemia Unawareness  No       2. Hypertension: Blood Pressure today:   BP Readings from Last 3 Encounters:   02/04/20 (!) 144/80   02/04/20 (!) 155/83   01/27/20 136/80   .  Blood pressure medications include Amlodipine 5 mg bid, lisinopril 40 mg qd.  Amlodipine dose was increased from 5 mg/day to 10 mg/day today by his " PCP.  3. Hyperlipidemia: Takes Atorvastatin 40 mg qd for lipid control.    4. Prevention:  Flu Shot- 10/2019  Pneumovax- 11/2015  Opthalmology-annaully  Dental-recommend semiannually  ASA- 325 mg every day.  Smoking-   PMH/PSH:  Past Medical History:   Diagnosis Date     Cerebral infarction (H)      Chronic infection      H/O heartburn      History of heartburn      Hypertension      Numbness and tingling      Obesity      GILA (obstructive sleep apnea) 10/22/2018     Renal stone      Type II or unspecified type diabetes mellitus without mention of complication, not stated as uncontrolled      Past Surgical History:   Procedure Laterality Date     AMPUTATE FOOT Left 8/18/2016    Procedure: AMPUTATE FOOT;  Surgeon: Jack Younger DPM;  Location: [facility] OR     AMPUTATE TOE(S) Right 2/1/2016    Procedure: AMPUTATE TOE(S);  Surgeon: Rachelle Manriquez DPM, Pod;  Location: [facility] OR     AMPUTATE TOE(S) Right 8/2/2019    Procedure: Right fourth toe partial amputation for treatment of osteomyelitis.;  Surgeon: Jack Younger DPM;  Location: [facility] OR     AMPUTATE TOE(S) Left 11/8/2019    Procedure: Left second toe amputation at the metatarsophalangeal joint.;  Surgeon: Rachelle Manriquez DPM, Podiatry/Foot and Ankle Surgery;  Location:  OR     ANGIOGRAM       COLONOSCOPY       COMBINED CYSTOSCOPY, RETROGRADES, URETEROSCOPY, INSERT STENT Left 8/21/2016    Procedure: COMBINED CYSTOSCOPY, RETROGRADES, URETEROSCOPY, INSERT STENT;  Surgeon: Artemio Valenzuela MD;  Location: [facility] OR     COMBINED CYSTOSCOPY, RETROGRADES, URETEROSCOPY, LASER HOLMIUM LITHOTRIPSY URETER(S), INSERT STENT Left 10/3/2016    Procedure: COMBINED CYSTOSCOPY, RETROGRADES, URETEROSCOPY, LASER HOLMIUM LITHOTRIPSY URETER(S), INSERT STENT;  Surgeon: Artemio Valenzuela MD;  Location: [facility] OR     EXTRACORPOREAL SHOCK WAVE LITHOTRIPSY (ESWL) Left 9/8/2016    Procedure: EXTRACORPOREAL SHOCK WAVE LITHOTRIPSY (ESWL);  Surgeon: Artemio Valenzuela MD;  Location:  [facility] OR     RECESSION GASTROCNEMIUS Right 2/1/2016    Procedure: RECESSION GASTROCNEMIUS;  Surgeon: Rachelle Manriquez DPM, Pod;  Location: [facility] OR     Family Hx:  Family History   Problem Relation Age of Onset     Arthritis Mother         SLE     Connective Tissue Disorder Mother         lupus     Diabetes Mother      Cerebrovascular Disease Mother      Cancer Mother      Diabetes Father      Diabetes Maternal Grandfather      Diabetes Sister      Thyroid disease:          DM2: Yes: Strong family history on mothers side - mom, 7 maternal uncles, two maternal aunts, + MGF.         Autoimmune: DM1, SLE, RA, Vitiligo Yes: Lupus    Social Hx:  Social History     Socioeconomic History     Marital status:      Spouse name: Sydney     Number of children: 2     Years of education: Not on file     Highest education level: Master's degree (e.g., MA, MS, Arleth, MEd, MSW, ELIANA)   Occupational History     Occupation: marketing     Employer: [employer]     Comment: [employer]   Social Needs     Financial resource strain: Not very hard     Food insecurity:     Worry: Never true     Inability: Never true     Transportation needs:     Medical: No     Non-medical: No   Tobacco Use     Smoking status: Never Smoker     Smokeless tobacco: Never Used   Substance and Sexual Activity     Alcohol use: Yes     Alcohol/week: 0.0 standard drinks     Frequency: 2-4 times a month     Drinks per session: 1 or 2     Binge frequency: Never     Comment: rare---red wine 2x per month     Drug use: No     Sexual activity: Yes     Partners: Female   Lifestyle     Physical activity:     Days per week: 0 days     Minutes per session: 0 min     Stress: Not on file   Relationships     Social connections:     Talks on phone: Never     Gets together: Never     Attends Tenriism service: More than 4 times per year     Active member of club or organization: No     Attends meetings of clubs or organizations: Never     Relationship status:      Intimate  partner violence:     Fear of current or ex partner: Not on file     Emotionally abused: Not on file     Physically abused: Not on file     Forced sexual activity: Not on file   Other Topics Concern     Parent/sibling w/ CABG, MI or angioplasty before 65F 55M? No   Social History Narrative     Not on file          MEDICATIONS:  has a current medication list which includes the following prescription(s): amlodipine, aspirin, atorvastatin, blood glucose, blood glucose monitoring, blood glucose, cholecalciferol, co-enzyme q10, collagenase, freestyle lilly 14 day sensor, gabapentin, insulin degludec, insulin lispro, pen needles, ketoconazole, ketoconazole, lisinopril, magnesium, metformin, multiple vitamins-minerals, omega-3 fatty acids, semaglutide, and tadalafil.    ROS     ROS: 10 point ROS neg other than the symptoms noted above in the HPI.    Physical Exam   VS: BP (!) 144/80 (BP Location: Right arm, Patient Position: Chair, Cuff Size: Adult Large)   Pulse 60   Temp 98  F (36.7  C) (Oral)   Wt 112 kg (247 lb)   BMI 33.50 kg/m    GENERAL: NAD, well dressed, answering questions appropriately, appears stated age.  HEENT: no exopthalmous, no proptosis, no lig lag, no retraction, no scleral icterus  RESPIRATORY: Normal respiratory effort.  CARDIOVASCULAR: Normal rate.  NEUROLOGY: CN grossly intact, no tremors  MSK: grossly intact. Normal gait and station.  PSYCH: Intact judgment and insight. A&OX3 with a cordial affect.    LABS:  A1c:   Component      Latest Ref Rng & Units 8/1/2019 10/21/2019 2/4/2020   Hemoglobin A1C      0 - 5.6 % 6.8 (H) 5.9 (H) 8.8 (H)     Basic Metabolic Panel:  !COMPREHENSIVE Latest Ref Rng & Units 12/20/2019   SODIUM 133 - 144 mmol/L 140   POTASSIUM 3.4 - 5.3 mmol/L 3.7   CHLORIDE 94 - 109 mmol/L 108   CO2 mmol/L    BUN 7 - 30 mg/dL 17   Creatinine 0.66 - 1.25 mg/dL    Creatinine 0.66 - 1.25 mg/dL 0.97   Glucose 70 - 99 mg/dL 82   ANION GAP 3 - 14 mmol/L 4   CALCIUM 8.5 - 10.1 mg/dL 9.3      LFTS/Cholesterol Panel:  !LIPID/HEPATIC Latest Ref Rng & Units 1/10/2019   CHOLESTEROL <200 mg/dL 96   TRIGLYCERIDES <150 mg/dL 89   HDL CHOLESTEROL >39 mg/dL 37 (L)   LDL CHOLESTEROL DIRECT 0 - 129 mg/dL    LDL CHOLESTEROL, CALCULATED <100 mg/dL 41   VLDL-CHOLESTEROL 0 - 30 mg/dL    NON HDL CHOLESTEROL <130 mg/dL 59   CHOLESTEROL/HDL RATIO 0.0 - 5.0    AST 0 - 45 U/L 67 (H)   ALT 0 - 70 U/L 82 (H)     Thyroid Function:   !THYROID Latest Ref Rng & Units 12/27/2017   TSH 0.40 - 4.00 mU/L 1.78     Urine MicroAlbumin:  Component      Latest Ref Rng & Units 1/10/2019   Creatinine Urine      mg/dL 240   Albumin Urine mg/L      mg/L 40   Albumin Urine mg/g Cr      0 - 17 mg/g Cr 16.83     Vitamin D:    All pertinent notes, labs, and images personally reviewed by me.     A/P  Mr.Patrick AKASH Rojas is a 59 year old here for the management of diabetes:    1. DM2 - Uncontrolled.  Today's A1c increased to 8.8%.  Diabetes is complicated by diabetic retinopathy, nephropathy, and neuropathy with history of left 1st and 2nd toe amptuated. partial amputations of the right 1st and 4th toes  He uses his phone as the Wattage reader so unable to download data - will work with him today to set up remote access, then we can access his Wattage reports remotely.  He will be continuing frequent travel until 4/1  Continue Metformin 1000 mg bid,  Continue Tresiba 55 units daily   Continue current dose of Humalog and Ozempic 1 mg daily.  Recommend a Humalog correction of 1:25>150, continue 1:9-10 insulin to carb ratio.  He plans to eat low carb meals for the remainder of his travel time due to ease of carb counting, this should help improve his blood sugars.  Continue Corwin.  Discussed changing to Dexcom but he didn't feel the alarms on Dexcom would really benefit him.    2. Hypertension - Uncontrolled.  BP elevated today.  Amlodipine dose increased today by his PCP.    3. Hyperlipidemia - On statin therapy    Labs ordered today:   Orders Placed  This Encounter   Procedures     Hemoglobin A1c     Albumin Random Urine Quantitative with Creat Ratio       Radiology/Consults ordered today: None    More than 50% of the time spent with Mr. Rojas on counseling / coordinating his care discussing the above plan of care. The patient indicates understanding of the above issues and agrees with the plan set forth.  Total face to face time was 20 minutes.       Follow-up:  3 months    Jasmin Arteaga NP  Endocrinology  [facility]  CC:

## 2020-02-04 NOTE — LETTER
"    2/4/2020         RE: Crispin Rojas  [address] [place] [address]        Dear Colleague,    Thank you for referring your patient, Crispin Rojas, to the [facility]. Please see a copy of my visit note below.    Name: Crispin Rojas  Seen today for Diabetes (Last seen 10/24/2019).  HPI:  Crispin Rojas is a 59 year old male who presents for the management of:    1. Type 2 DM:  Originally diagnosed at the age of: 1997.  Insulin treated 15+ years.    Current Regimen: Metformin 1000 mg bid, Ozempic 1 mg weekly , Tresiba 55 units q am,  Humalog 1:9 .  Has not been correcting his blood sugar unless it is >200.    Started Bydureon 5/2018 - tolerated well.    Changed to Ozempic 6/2018 - feels Ozempic is working better  Using personal CGM - Corwin  Hospitalized 6/2019 for cellulitis and bilateral diabetic foot ulcers.  Healed now.    BS checks: 3-5 times per day, + Corwin  Meter Download: didn't bring meter or Corwin reader.  Corwin CGM download:     Previously discussed insulin pump therapy , not interested in pump therapy at this time.    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   He stopped am Humalog on work days and this helped decrease low blood sugars.    Has been traveling a great deal - eating out a lot, difficult to count carbs in restaurant food.  Says Corwin shows most blood sugars under 200.  Tends to \"under dose\" Humalog if unsure of carbs in the meal.    Complications:   Diabetes Complications  Description / Detail    Diabetic Retinopathy   + diabetic retinopathy, last exam 6/2019, also sees  retinal specialist annually   CAD / PAD  No   Neuropathy   Yes, Left 1st and 2nd toe amptuated. partial amputations of the right 1st and 4th toes   Nephropathy / Microalbuminuria  Yes, elevated microalbumin   Gastroparesis  No   Hypoglycemia Unawareness  No       2. Hypertension: Blood Pressure today: "   BP Readings from Last 3 Encounters:   02/04/20 (!) 144/80   02/04/20 (!) 155/83   01/27/20 136/80   .  Blood pressure medications include Amlodipine 5 mg bid, lisinopril 40 mg qd.  Amlodipine dose was increased from 5 mg/day to 10 mg/day today by his PCP.  3. Hyperlipidemia: Takes Atorvastatin 40 mg qd for lipid control.    4. Prevention:  Flu Shot- 10/2019  Pneumovax- 11/2015  Opthalmology-annaully  Dental-recommend semiannually  ASA- 325 mg every day.  Smoking-   PMH/PSH:  Past Medical History:   Diagnosis Date     Cerebral infarction (H)      Chronic infection      H/O heartburn      History of heartburn      Hypertension      Numbness and tingling      Obesity      GILA (obstructive sleep apnea) 10/22/2018     Renal stone      Type II or unspecified type diabetes mellitus without mention of complication, not stated as uncontrolled      Past Surgical History:   Procedure Laterality Date     AMPUTATE FOOT Left 8/18/2016    Procedure: AMPUTATE FOOT;  Surgeon: Jack Younger DPM;  Location: [facility] OR     AMPUTATE TOE(S) Right 2/1/2016    Procedure: AMPUTATE TOE(S);  Surgeon: Rachelle Manriquez DPM, Pod;  Location: [facility]     AMPUTATE TOE(S) Right 8/2/2019    Procedure: Right fourth toe partial amputation for treatment of osteomyelitis.;  Surgeon: Jack Younger DPM;  Location: [facility]     AMPUTATE TOE(S) Left 11/8/2019    Procedure: Left second toe amputation at the metatarsophalangeal joint.;  Surgeon: Rachelle Manriquez DPM, Podiatry/Foot and Ankle Surgery;  Location: [facility] OR     ANGIOGRAM       COLONOSCOPY       COMBINED CYSTOSCOPY, RETROGRADES, URETEROSCOPY, INSERT STENT Left 8/21/2016    Procedure: COMBINED CYSTOSCOPY, RETROGRADES, URETEROSCOPY, INSERT STENT;  Surgeon: Artemio Valenzuela MD;  Location: [facility] OR     COMBINED CYSTOSCOPY, RETROGRADES, URETEROSCOPY, LASER HOLMIUM LITHOTRIPSY URETER(S), INSERT STENT Left 10/3/2016    Procedure: COMBINED CYSTOSCOPY, RETROGRADES, URETEROSCOPY, LASER HOLMIUM  LITHOTRIPSY URETER(S), INSERT STENT;  Surgeon: Artemio Valenzuela MD;  Location: [facility] OR     EXTRACORPOREAL SHOCK WAVE LITHOTRIPSY (ESWL) Left 9/8/2016    Procedure: EXTRACORPOREAL SHOCK WAVE LITHOTRIPSY (ESWL);  Surgeon: Artemio Valenzuela MD;  Location: [facility]     RECESSION GASTROCNEMIUS Right 2/1/2016    Procedure: RECESSION GASTROCNEMIUS;  Surgeon: Rachelle Mnariquez, DPM, Pod;  Location: [facility] OR     Family Hx:  Family History   Problem Relation Age of Onset     Arthritis Mother         SLE     Connective Tissue Disorder Mother         lupus     Diabetes Mother      Cerebrovascular Disease Mother      Cancer Mother      Diabetes Father      Diabetes Maternal Grandfather      Diabetes Sister      Thyroid disease:          DM2: Yes: Strong family history on mothers side - mom, 7 maternal uncles, two maternal aunts, + MGF.         Autoimmune: DM1, SLE, RA, Vitiligo Yes: Lupus    Social Hx:  Social History     Socioeconomic History     Marital status:      Spouse name: Sydney     Number of children: 2     Years of education: Not on file     Highest education level: Master's degree (e.g., MA, MS, Arleth, MEd, MSW, ELIANA)   Occupational History     Occupation: marketing     Employer: [employer]     Comment: [employer]   Social Needs     Financial resource strain: Not very hard     Food insecurity:     Worry: Never true     Inability: Never true     Transportation needs:     Medical: No     Non-medical: No   Tobacco Use     Smoking status: Never Smoker     Smokeless tobacco: Never Used   Substance and Sexual Activity     Alcohol use: Yes     Alcohol/week: 0.0 standard drinks     Frequency: 2-4 times a month     Drinks per session: 1 or 2     Binge frequency: Never     Comment: rare---red wine 2x per month     Drug use: No     Sexual activity: Yes     Partners: Female   Lifestyle     Physical activity:     Days per week: 0 days     Minutes per session: 0 min     Stress: Not on file   Relationships     Social  connections:     Talks on phone: Never     Gets together: Never     Attends Voodoo service: More than 4 times per year     Active member of club or organization: No     Attends meetings of clubs or organizations: Never     Relationship status:      Intimate partner violence:     Fear of current or ex partner: Not on file     Emotionally abused: Not on file     Physically abused: Not on file     Forced sexual activity: Not on file   Other Topics Concern     Parent/sibling w/ CABG, MI or angioplasty before 65F 55M? No   Social History Narrative     Not on file          MEDICATIONS:  has a current medication list which includes the following prescription(s): amlodipine, aspirin, atorvastatin, blood glucose, blood glucose monitoring, blood glucose, cholecalciferol, co-enzyme q10, collagenase, freestyle lilly 14 day sensor, gabapentin, insulin degludec, insulin lispro, pen needles, ketoconazole, ketoconazole, lisinopril, magnesium, metformin, multiple vitamins-minerals, omega-3 fatty acids, semaglutide, and tadalafil.    ROS     ROS: 10 point ROS neg other than the symptoms noted above in the HPI.    Physical Exam   VS: BP (!) 144/80 (BP Location: Right arm, Patient Position: Chair, Cuff Size: Adult Large)   Pulse 60   Temp 98  F (36.7  C) (Oral)   Wt 112 kg (247 lb)   BMI 33.50 kg/m     GENERAL: NAD, well dressed, answering questions appropriately, appears stated age.  HEENT: no exopthalmous, no proptosis, no lig lag, no retraction, no scleral icterus  RESPIRATORY: Normal respiratory effort.  CARDIOVASCULAR: Normal rate.  NEUROLOGY: CN grossly intact, no tremors  MSK: grossly intact. Normal gait and station.  PSYCH: Intact judgment and insight. A&OX3 with a cordial affect.    LABS:  A1c:   Component      Latest Ref Rng & Units 8/1/2019 10/21/2019 2/4/2020   Hemoglobin A1C      0 - 5.6 % 6.8 (H) 5.9 (H) 8.8 (H)     Basic Metabolic Panel:  !COMPREHENSIVE Latest Ref Rng & Units 12/20/2019   SODIUM 133 - 144  mmol/L 140   POTASSIUM 3.4 - 5.3 mmol/L 3.7   CHLORIDE 94 - 109 mmol/L 108   CO2 mmol/L    BUN 7 - 30 mg/dL 17   Creatinine 0.66 - 1.25 mg/dL    Creatinine 0.66 - 1.25 mg/dL 0.97   Glucose 70 - 99 mg/dL 82   ANION GAP 3 - 14 mmol/L 4   CALCIUM 8.5 - 10.1 mg/dL 9.3     LFTS/Cholesterol Panel:  !LIPID/HEPATIC Latest Ref Rng & Units 1/10/2019   CHOLESTEROL <200 mg/dL 96   TRIGLYCERIDES <150 mg/dL 89   HDL CHOLESTEROL >39 mg/dL 37 (L)   LDL CHOLESTEROL DIRECT 0 - 129 mg/dL    LDL CHOLESTEROL, CALCULATED <100 mg/dL 41   VLDL-CHOLESTEROL 0 - 30 mg/dL    NON HDL CHOLESTEROL <130 mg/dL 59   CHOLESTEROL/HDL RATIO 0.0 - 5.0    AST 0 - 45 U/L 67 (H)   ALT 0 - 70 U/L 82 (H)     Thyroid Function:   !THYROID Latest Ref Rng & Units 12/27/2017   TSH 0.40 - 4.00 mU/L 1.78     Urine MicroAlbumin:  Component      Latest Ref Rng & Units 1/10/2019   Creatinine Urine      mg/dL 240   Albumin Urine mg/L      mg/L 40   Albumin Urine mg/g Cr      0 - 17 mg/g Cr 16.83     Vitamin D:    All pertinent notes, labs, and images personally reviewed by me.     A/P  Mr.Patrick AKASH Rojas is a 59 year old here for the management of diabetes:    1. DM2 - Uncontrolled.  Today's A1c increased to 8.8%.  Diabetes is complicated by diabetic retinopathy, nephropathy, and neuropathy with history of left 1st and 2nd toe amptuated. partial amputations of the right 1st and 4th toes  He uses his phone as the Corwin reader so unable to download data - will work with him today to set up remote access, then we can access his Corwin reports remotely.  He will be continuing frequent travel until 4/1  Continue Metformin 1000 mg bid,  Continue Tresiba 55 units daily   Continue current dose of Humalog and Ozempic 1 mg daily.  Recommend a Humalog correction of 1:25>150, continue 1:9-10 insulin to carb ratio.  He plans to eat low carb meals for the remainder of his travel time due to ease of carb counting, this should help improve his blood sugars.  Continue Corwin.  Discussed  changing to Dexcom but he didn't feel the alarms on Dexcom would really benefit him.    2. Hypertension - Uncontrolled.  BP elevated today.  Amlodipine dose increased today by his PCP.    3. Hyperlipidemia - On statin therapy    Labs ordered today:   Orders Placed This Encounter   Procedures     Hemoglobin A1c     Albumin Random Urine Quantitative with Creat Ratio       Radiology/Consults ordered today: None    More than 50% of the time spent with Mr. Rojas on counseling / coordinating his care discussing the above plan of care. The patient indicates understanding of the above issues and agrees with the plan set forth.  Total face to face time was 20 minutes.       Follow-up:  3 months    Jasmin Arteaga NP  Endocrinology  [facility]  CC:     Again, thank you for allowing me to participate in the care of your patient.        Sincerely,        SARAH Raymundo CNP

## 2020-02-04 NOTE — PROGRESS NOTES
SUBJECTIVE:   CC: Crispin Rojas is an 59 year old male who presents for preventative health visit.     Healthy Habits:     Getting at least 3 servings of Calcium per day:  NO    Bi-annual eye exam:  Yes    Dental care twice a year:  Yes    Sleep apnea or symptoms of sleep apnea:  Sleep apnea    Diet:  Diabetic    Frequency of exercise:  None    Taking medications regularly:  Yes    Medication side effects:  None    PHQ-2 Total Score: 0    Additional concerns today:  Yes    LONGSTANDING DM WITH VASCULAR FOOT COMPLICATIONS AND AMPUTATION HX     Travel  To  [place]arm [place] in the winter     Today's PHQ-2 Score:   PHQ-2 ( 1999 Pfizer) 2/4/2020   Q1: Little interest or pleasure in doing things 0   Q2: Feeling down, depressed or hopeless 0   PHQ-2 Score 0   Q1: Little interest or pleasure in doing things Not at all   Q2: Feeling down, depressed or hopeless Not at all   PHQ-2 Score 0       Abuse: Current or Past(Physical, Sexual or Emotional)- No  Do you feel safe in your environment? Yes        Social History     Tobacco Use     Smoking status: Never Smoker     Smokeless tobacco: Never Used   Substance Use Topics     Alcohol use: Yes     Alcohol/week: 0.0 standard drinks     Frequency: 2-4 times a month     Drinks per session: 1 or 2     Binge frequency: Never     Comment: rare---red wine 2x per month         Alcohol Use 2/4/2020   Prescreen: >3 drinks/day or >7 drinks/week? No   Prescreen: >3 drinks/day or >7 drinks/week? -       Last PSA:   PSA   Date Value Ref Range Status   12/05/2016 0.31 0 - 4 ug/L Final     Comment:     Assay Method:  Chemiluminescence using Siemens Vista analyzer       Reviewed orders with patient. Reviewed health maintenance and updated orders accordingly - Yes  BP Readings from Last 3 Encounters:   02/04/20 (!) 150/78   01/27/20 136/80   12/23/19 132/70    Wt Readings from Last 3 Encounters:   02/04/20 112 kg (247 lb)   01/27/20 109.8 kg (242 lb)   12/23/19 109.8 kg (242 lb)                     Reviewed and updated as needed this visit by clinical staff  Tobacco  Allergies  Med Hx  Surg Hx  Fam Hx  Soc Hx        Reviewed and updated as needed this visit by Provider            Review of Systems   Constitutional: Negative for chills and fever.   HENT: Positive for ear pain. Negative for congestion, hearing loss and sore throat.    Eyes: Negative for pain and visual disturbance.   Respiratory: Negative for cough and shortness of breath.    Cardiovascular: Negative for chest pain, palpitations and peripheral edema.   Gastrointestinal: Positive for diarrhea and nausea. Negative for abdominal pain, constipation, heartburn and hematochezia.   Genitourinary: Positive for impotence. Negative for discharge, dysuria, frequency, genital sores, hematuria and urgency.   Musculoskeletal: Negative for arthralgias, joint swelling and myalgias.   Skin: Negative for rash.   Neurological: Negative for dizziness, weakness, headaches and paresthesias.   Psychiatric/Behavioral: Negative for mood changes. The patient is not nervous/anxious.      CONSTITUTIONAL: NEGATIVE for fever, chills, change in weight  INTEGUMENTARY/SKIN: NEGATIVE for worrisome rashes, moles or lesions  EYES: NEGATIVE for vision changes or irritation  ENT: NEGATIVE for ear, mouth and throat problems  RESP: NEGATIVE for significant cough or SOB  CV: NEGATIVE for chest pain, palpitations or peripheral edema  GI: NEGATIVE for nausea, abdominal pain, heartburn, or change in bowel habits   male: negative for dysuria, hematuria, decreased urinary stream, erectile dysfunction, urethral discharge  MUSCULOSKELETAL: NEGATIVE for significant arthralgias or myalgia  NEURO: NEGATIVE for weakness, dizziness or paresthesias  PSYCHIATRIC: NEGATIVE for changes in mood or affect    OBJECTIVE:   Ht 1.829 m (6')   Wt 112 kg (247 lb)   BMI 33.50 kg/m      Physical Exam  GENERAL: healthy, alert and no distress  EYES: Eyes grossly normal to inspection, PERRL and  conjunctivae and sclerae normal  HENT: ear canals and TM's normal, nose and mouth without ulcers or lesions  NECK: no adenopathy, no asymmetry, masses, or scars and thyroid normal to palpation  RESP: lungs clear to auscultation - no rales, rhonchi or wheezes  CV: regular rate and rhythm, normal S1 S2, no S3 or S4, no murmur, click or rub, no peripheral edema and peripheral pulses strong  ABDOMEN: soft, nontender, no hepatosplenomegaly, no masses and bowel sounds normal  MS: no gross musculoskeletal defects noted, no edema  SKIN: no suspicious lesions or rashes  NEURO: Normal strength and tone, mentation intact and speech normal  PSYCH: mentation appears normal, affect normal/bright    Diagnostic Test Results:  Labs reviewed in Epic    ASSESSMENT/PLAN:   1. Other specified transient cerebral ischemias  With hypertension    - amLODIPine (NORVASC) 5 MG tablet; Take 1 tablet (5 mg) by mouth daily  Dispense: 90 tablet; Refill: 0    COUNSELING:   Reviewed preventive health counseling, as reflected in patient instructions       Regular exercise       Healthy diet/nutrition       Vision screening    Estimated body mass index is 33.5 kg/m  as calculated from the following:    Height as of this encounter: 1.829 m (6').    Weight as of this encounter: 112 kg (247 lb).     Weight management plan: Specific weight management program called carbohydrate and  total  calorie reduction  discussed     reports that he has never smoked. He has never used smokeless tobacco.      Counseling Resources:  ATP IV Guidelines  Pooled Cohorts Equation Calculator  FRAX Risk Assessment  ICSI Preventive Guidelines  Dietary Guidelines for Americans, 2010  USDA's MyPlate  ASA Prophylaxis  Lung CA Screening    Aden Lopez MD  [facility]  Answers for HPI/ROS submitted by the patient on 2/4/2020   Annual Exam:  If you checked off any problems, how difficult have these problems made it for you to do your work, take care of  things at home, or get along with other people?: Not difficult at all  PHQ9 TOTAL SCORE: 0  MICHELLE 7 TOTAL SCORE: 0

## 2020-02-04 NOTE — PATIENT INSTRUCTIONS
Component      Latest Ref Rng & Units 8/1/2019 10/21/2019 2/4/2020   Hemoglobin A1C      0 - 5.6 % 6.8 (H) 5.9 (H) 8.8 (H)       For correction, you can try to add 1 unit if 150-175 and 2 units if 176-200.  If that's too much and causes lows, you could just use 1 extra unit if 150-200.    Remember you can enter insulin doses in your Corwin, after scanning, you can select the pencil icon in the upper right hand corner to enter your insulin doses.    I think your plan to eat lower carb while traveling is a good option for keeping blood sugars better controlled.    We'll regroup in April or May, when your schedule is more stable.    I'll my chart message you tomorrow to remind you order Corwin covers and skin tac.    Jasmin Arteaga NP  Endocrinology

## 2020-02-05 LAB
ALBUMIN SERPL-MCNC: 3.8 G/DL (ref 3.4–5)
ALP SERPL-CCNC: 54 U/L (ref 40–150)
ALT SERPL W P-5'-P-CCNC: 59 U/L (ref 0–70)
ANION GAP SERPL CALCULATED.3IONS-SCNC: 8 MMOL/L (ref 3–14)
AST SERPL W P-5'-P-CCNC: 50 U/L (ref 0–45)
BILIRUB SERPL-MCNC: 0.5 MG/DL (ref 0.2–1.3)
BUN SERPL-MCNC: 20 MG/DL (ref 7–30)
CALCIUM SERPL-MCNC: 9 MG/DL (ref 8.5–10.1)
CHLORIDE SERPL-SCNC: 107 MMOL/L (ref 94–109)
CO2 SERPL-SCNC: 24 MMOL/L (ref 20–32)
CREAT SERPL-MCNC: 0.89 MG/DL (ref 0.66–1.25)
GFR SERPL CREATININE-BSD FRML MDRD: >90 ML/MIN/{1.73_M2}
GLUCOSE SERPL-MCNC: 252 MG/DL (ref 70–99)
POTASSIUM SERPL-SCNC: 4.9 MMOL/L (ref 3.4–5.3)
PROT SERPL-MCNC: 7.6 G/DL (ref 6.8–8.8)
SODIUM SERPL-SCNC: 139 MMOL/L (ref 133–144)

## 2020-02-05 ASSESSMENT — ANXIETY QUESTIONNAIRES: GAD7 TOTAL SCORE: 0

## 2020-02-05 ASSESSMENT — PATIENT HEALTH QUESTIONNAIRE - PHQ9: SUM OF ALL RESPONSES TO PHQ QUESTIONS 1-9: 0

## 2020-02-05 NOTE — RESULT ENCOUNTER NOTE
Crispin,   Your urine protein was elevated.  This is not entirely new.  It was elevated once before in 2017 but then normalized again last year.  We'll continue to monitor this.  The treatment for this is good blood sugar and blood pressure control, both of which you are working on improving.  Here's a copy of your test results for your records.  Jasmin Arteaga NP  Endocrinology

## 2020-02-06 NOTE — TELEPHONE ENCOUNTER
DIAGNOSIS: Bilat Cysts/Bumps on hands, No Imaging, Per Pt   APPOINTMENT DATE: 2/20   NOTES STATUS DETAILS   OFFICE NOTE from referring provider N/A    OFFICE NOTE from other specialist N/A    DISCHARGE SUMMARY from hospital N/A    DISCHARGE REPORT from the ER N/A    OPERATIVE REPORT N/A    MEDICATION LIST Internal    MRI N/A    CT SCAN N/A    XRAYS (IMAGES & REPORTS) N/A

## 2020-02-20 ENCOUNTER — OFFICE VISIT (OUTPATIENT)
Dept: ORTHOPEDICS | Facility: CLINIC | Age: 60
End: 2020-02-20
Payer: COMMERCIAL

## 2020-02-20 ENCOUNTER — PRE VISIT (OUTPATIENT)
Dept: ORTHOPEDICS | Facility: CLINIC | Age: 60
End: 2020-02-20

## 2020-02-20 VITALS — BODY MASS INDEX: 32.78 KG/M2 | WEIGHT: 242 LBS | HEIGHT: 72 IN

## 2020-02-20 DIAGNOSIS — M72.0 DUPUYTREN CONTRACTURE: Primary | ICD-10-CM

## 2020-02-20 DIAGNOSIS — R22.33: ICD-10-CM

## 2020-02-20 ASSESSMENT — MIFFLIN-ST. JEOR: SCORE: 1960.7

## 2020-02-20 NOTE — LETTER
2/20/2020      RE: Crispin Rojas  [address] [place] [address]       Sports Medicine Clinic Visit    PCP: Aden Lopezpj Rojas is a 59 year old male who is seen as a self referral presenting with bilateral hand contracture    Injury: None    Location of Pain: bilateral hand  Duration of Pain: 5 years, worse in the last 3-4 months  Pain is better with: Heat  Pain is worse with: Constant  Additional Features:   Treatment so far consists of: Nothing  Prior History of related problems:     Ht 1.829 m (6')   Wt 109.8 kg (242 lb)   BMI 32.82 kg/m            PMH:  Past Medical History:   Diagnosis Date     Cerebral infarction (H)      Chronic infection      H/O heartburn      History of heartburn      Hypertension      Numbness and tingling      Obesity      GILA (obstructive sleep apnea) 10/22/2018     Renal stone      Type II or unspecified type diabetes mellitus without mention of complication, not stated as uncontrolled        Active problem list:  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     CARDIOVASCULAR SCREENING; LDL GOAL LESS THAN 100     Morbid obesity, unspecified obesity type (H)     Other specified transient cerebral ischemias     Calculus of kidney     Chronic left shoulder pain     Cervicalgia     Type 2 diabetes mellitus with peripheral neuropathy (H)     Hyperlipidemia LDL goal <100     Essential hypertension     Right bundle branch block     GILA (obstructive sleep apnea)     Thoracic aortic aneurysm without rupture (H)     Cellulitis     Cholecystitis     Osteomyelitis (H)     Diabetic ulcer of lower extremity (H)     Post-operative state       FH:  Family History   Problem Relation Age of Onset     Arthritis Mother         SLE     Connective Tissue Disorder Mother         lupus     Diabetes Mother      Cerebrovascular Disease Mother      Cancer Mother      Diabetes Father      Diabetes Maternal  Grandfather      Diabetes Sister        SH:  Social History     Socioeconomic History     Marital status:      Spouse name: Sydney     Number of children: 2     Years of education: Not on file     Highest education level: Master's degree (e.g., MA, MS, Arleth, MEd, MSW, ELIANA)   Occupational History     Occupation: marketing     Employer: [employer]     Comment: [employer]   Social Needs     Financial resource strain: Not very hard     Food insecurity:     Worry: Never true     Inability: Never true     Transportation needs:     Medical: No     Non-medical: No   Tobacco Use     Smoking status: Never Smoker     Smokeless tobacco: Never Used   Substance and Sexual Activity     Alcohol use: Yes     Alcohol/week: 0.0 standard drinks     Frequency: 2-4 times a month     Drinks per session: 1 or 2     Binge frequency: Never     Comment: rare---red wine 2x per month     Drug use: No     Sexual activity: Yes     Partners: Female   Lifestyle     Physical activity:     Days per week: 0 days     Minutes per session: 0 min     Stress: Not on file   Relationships     Social connections:     Talks on phone: Never     Gets together: Never     Attends Cheondoism service: More than 4 times per year     Active member of club or organization: No     Attends meetings of clubs or organizations: Never     Relationship status:      Intimate partner violence:     Fear of current or ex partner: Not on file     Emotionally abused: Not on file     Physically abused: Not on file     Forced sexual activity: Not on file   Other Topics Concern     Parent/sibling w/ CABG, MI or angioplasty before 65F 55M? No   Social History Narrative     Not on file       MEDS:  See EMR, reviewed  ALL:  See EMR, reviewed    REVIEW OF SYSTEMS:  CONSTITUTIONAL:NEGATIVE for fever, chills, change in weight  INTEGUMENTARY/SKIN: NEGATIVE for worrisome rashes, moles or lesions  EYES: NEGATIVE for vision changes or irritation  ENT/MOUTH: NEGATIVE for ear, mouth  and throat problems  RESP:NEGATIVE for significant cough or SOB  BREAST: NEGATIVE for masses, tenderness or discharge  CV: NEGATIVE for chest pain, palpitations or peripheral edema  GI: NEGATIVE for nausea, abdominal pain, heartburn, or change in bowel habits  :NEGATIVE for frequency, dysuria, or hematuria  :NEGATIVE for frequency, dysuria, or hematuria  NEURO: NEGATIVE for weakness, dizziness or paresthesias  ENDOCRINE: NEGATIVE for temperature intolerance, skin/hair changes  HEME/ALLERGY/IMMUNE: NEGATIVE for bleeding problems  PSYCHIATRIC: NEGATIVE for changes in mood or affect      Subjective: 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  He can still get his hand flat on the table.  He is tried some stretching on his own.  Left is particularly bothersome since it bothers him with his golf clubs.  He does not have oversized club heads and he has tried to get an accommodative hand .    Objective he has bilateral Dupuytren's contractures involving the bilateral palms.  He can still get his hands flat on a table.   strength is normal.  There is no synovial thickening at the MCPs.  Overlying skin is normal.  Capillary refill is normal distally.  Appropriate conversation affect.      Assessment: Bilateral hand effusions contracture    Plan: Explained that usually no particular intervention is done unless he started to develop contracture that makes it difficult for him to get his hand flat on a table top.  He would still like to discuss his options with hand surgery considering this left one is particularly bothersome with his golfing.  Referral placed.              Crispin Slaughter MD

## 2020-02-20 NOTE — PROGRESS NOTES
Sports Medicine Clinic Visit    PCP: Aden Lopez AKASH Rojas is a 59 year old male who is seen as a self referral presenting with bilateral hand contracture    Injury: None    Location of Pain: bilateral hand  Duration of Pain: 5 years, worse in the last 3-4 months  Pain is better with: Heat  Pain is worse with: Constant  Additional Features:   Treatment so far consists of: Nothing  Prior History of related problems:     Ht 1.829 m (6')   Wt 109.8 kg (242 lb)   BMI 32.82 kg/m           PMH:  Past Medical History:   Diagnosis Date     Cerebral infarction (H)      Chronic infection      H/O heartburn      History of heartburn      Hypertension      Numbness and tingling      Obesity      GILA (obstructive sleep apnea) 10/22/2018     Renal stone      Type II or unspecified type diabetes mellitus without mention of complication, not stated as uncontrolled        Active problem list:  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     CARDIOVASCULAR SCREENING; LDL GOAL LESS THAN 100     Morbid obesity, unspecified obesity type (H)     Other specified transient cerebral ischemias     Calculus of kidney     Chronic left shoulder pain     Cervicalgia     Type 2 diabetes mellitus with peripheral neuropathy (H)     Hyperlipidemia LDL goal <100     Essential hypertension     Right bundle branch block     GILA (obstructive sleep apnea)     Thoracic aortic aneurysm without rupture (H)     Cellulitis     Cholecystitis     Osteomyelitis (H)     Diabetic ulcer of lower extremity (H)     Post-operative state       FH:  Family History   Problem Relation Age of Onset     Arthritis Mother         SLE     Connective Tissue Disorder Mother         lupus     Diabetes Mother      Cerebrovascular Disease Mother      Cancer Mother      Diabetes Father      Diabetes Maternal Grandfather      Diabetes Sister        SH:  Social History     Socioeconomic History     Marital  status:      Spouse name: Sydney     Number of children: 2     Years of education: Not on file     Highest education level: Master's degree (e.g., MA, MS, Arleth, MEd, MSW, ELIANA)   Occupational History     Occupation: marketing     Employer: [employer]     Comment: [employer]   Social Needs     Financial resource strain: Not very hard     Food insecurity:     Worry: Never true     Inability: Never true     Transportation needs:     Medical: No     Non-medical: No   Tobacco Use     Smoking status: Never Smoker     Smokeless tobacco: Never Used   Substance and Sexual Activity     Alcohol use: Yes     Alcohol/week: 0.0 standard drinks     Frequency: 2-4 times a month     Drinks per session: 1 or 2     Binge frequency: Never     Comment: rare---red wine 2x per month     Drug use: No     Sexual activity: Yes     Partners: Female   Lifestyle     Physical activity:     Days per week: 0 days     Minutes per session: 0 min     Stress: Not on file   Relationships     Social connections:     Talks on phone: Never     Gets together: Never     Attends Anabaptist service: More than 4 times per year     Active member of club or organization: No     Attends meetings of clubs or organizations: Never     Relationship status:      Intimate partner violence:     Fear of current or ex partner: Not on file     Emotionally abused: Not on file     Physically abused: Not on file     Forced sexual activity: Not on file   Other Topics Concern     Parent/sibling w/ CABG, MI or angioplasty before 65F 55M? No   Social History Narrative     Not on file       MEDS:  See EMR, reviewed  ALL:  See EMR, reviewed    REVIEW OF SYSTEMS:  CONSTITUTIONAL:NEGATIVE for fever, chills, change in weight  INTEGUMENTARY/SKIN: NEGATIVE for worrisome rashes, moles or lesions  EYES: NEGATIVE for vision changes or irritation  ENT/MOUTH: NEGATIVE for ear, mouth and throat problems  RESP:NEGATIVE for significant cough or SOB  BREAST: NEGATIVE for masses,  tenderness or discharge  CV: NEGATIVE for chest pain, palpitations or peripheral edema  GI: NEGATIVE for nausea, abdominal pain, heartburn, or change in bowel habits  :NEGATIVE for frequency, dysuria, or hematuria  :NEGATIVE for frequency, dysuria, or hematuria  NEURO: NEGATIVE for weakness, dizziness or paresthesias  ENDOCRINE: NEGATIVE for temperature intolerance, skin/hair changes  HEME/ALLERGY/IMMUNE: NEGATIVE for bleeding problems  PSYCHIATRIC: NEGATIVE for changes in mood or affect      Subjective: 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  He can still get his hand flat on the table.  He is tried some stretching on his own.  Left is particularly bothersome since it bothers him with his golf clubs.  He does not have oversized club heads and he has tried to get an accommodative hand .    Objective he has bilateral Dupuytren's contractures involving the bilateral palms.  He can still get his hands flat on a table.   strength is normal.  There is no synovial thickening at the MCPs.  Overlying skin is normal.  Capillary refill is normal distally.  Appropriate conversation affect.      Assessment: Bilateral hand effusions contracture    Plan: Explained that usually no particular intervention is done unless he started to develop contracture that makes it difficult for him to get his hand flat on a table top.  He would still like to discuss his options with hand surgery considering this left one is particularly bothersome with his golfing.  Referral placed.

## 2020-02-21 NOTE — TELEPHONE ENCOUNTER
RECORDS RECEIVED FROM: Dupuytren contracture. Referred by Dr Slaughter.    DATE RECEIVED: Mar 2, 2020   NOTES STATUS DETAILS   OFFICE NOTE from referring provider Crispin Hairston MD         02/21/20   8:46 AM   See 2/20/20 pre-visit  Jaylyn Garrett CMA

## 2020-03-02 ENCOUNTER — OFFICE VISIT (OUTPATIENT)
Dept: ORTHOPEDICS | Facility: CLINIC | Age: 60
End: 2020-03-02
Attending: FAMILY MEDICINE
Payer: COMMERCIAL

## 2020-03-02 ENCOUNTER — PRE VISIT (OUTPATIENT)
Dept: ORTHOPEDICS | Facility: CLINIC | Age: 60
End: 2020-03-02

## 2020-03-02 DIAGNOSIS — M72.0 DUPUYTREN'S CONTRACTURE: Primary | ICD-10-CM

## 2020-03-02 NOTE — PROGRESS NOTES
[facility]  Orthopedics  Jonnathan Rodarte MD  2020     Name: Crispin Rojas  MRN: [id number]  Age: 59 year old  : 1960  Referring provider: Crispin Slaughter     Chief Complaint: Consult For (Bilateral dupuytren's contracture - right worse than left)    History of Present Illness:   Crispin Rojas is a 59 year old male who presents today for evaluation of bilateral Dupuytren's contracture. 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 He reports that he first noticed this 5 or 6 years ago. Occasionally the cords are tender.  He has not noticed any progressive contracture although he notes he has a chronic contracture of the small finger PIP joint from a childhood injury. He does do stretches for the hands that are helpful.  Recently he has begun to get bothersome cramps in his hands that happen everyday in the early afternoon. He notes his right hand cramps are worse than the left. His symptoms seems to fluctuate with temperature and symptoms are better when his hands are warmer.  He denies any family history of Dupuytren's. No further concerns at this time.     Review of Systems:   A 10-point review of systems was obtained and is negative except for as noted in the HPI.     Medications:     Current Outpatient Medications:      amLODIPine (NORVASC) 5 MG tablet, Take 1 tablet (5 mg) by mouth 2 times daily, Disp: 180 tablet, Rfl: 1     aspirin (ASA) 325 MG EC tablet, Take 325 mg by mouth daily, Disp: , Rfl:      atorvastatin (LIPITOR) 40 MG tablet, TAKE 1 TABLET BY MOUTH EVERY DAY, Disp: 90 tablet, Rfl: 2     blood glucose (NO BRAND SPECIFIED) lancets standard, Use to test blood sugar 3 times daily or as directed., Disp: 300 each, Rfl: 3     blood glucose monitoring (NO BRAND SPECIFIED) meter device kit, Use to test blood sugar 3 times daily or as directed., Disp: 1 kit, Rfl: 0     blood glucose monitoring (NO BRAND SPECIFIED) test strip, Use to test blood sugars 3 times  daily or as directed, Disp: 300 strip, Rfl: 3     Cholecalciferol (VITAMIN D3 PO), Take 1,000 Units by mouth daily , Disp: , Rfl:      Co-Enzyme Q10 100 MG CAPS, Take 100 mg by mouth daily , Disp: , Rfl:      collagenase (SANTYL) 250 UNIT/GM external ointment, Apply topically daily Apply to ulcer daily, Disp: 30 g, Rfl: 1     Continuous Blood Gluc Sensor (FREESTYLE LUCERO 14 DAY SENSOR) MISC, 1 each every 14 days, Disp: 2 each, Rfl: 11     gabapentin (NEURONTIN) 100 MG capsule, Take 300 mg by mouth 3 times daily, Disp: , Rfl:      insulin degludec (TRESIBA FLEXTOUCH) 100 UNIT/ML pen, Inject 55 Units Subcutaneous daily, Disp: 15 mL, Rfl: 11     insulin lispro (HUMALOG PEN) 100 UNIT/ML (1 unit dial) pen, 1 units per 9 grams of carbohydrate before each meal + 1:16 over 150 correction.  TDD 70 units, Disp: 45 mL, Rfl: 3     Insulin Pen Needle (PEN NEEDLES) 31G X 5 MM MISC, 1 Device 5 times daily , TWICE DAILY WITH LANTUS AND 3 TIMES A DAY PRN WITH NOVOLOG FLEXPEN, Disp: 200 each, Rfl: 11     ketoconazole (NIZORAL) 2 % external shampoo, APPLY EXTERNALLY 2 TIMES A WEEK AS DIRECTED, Disp: 120 mL, Rfl: 3     ketoconazole (NIZORAL) 2 % external shampoo, Apply topically daily as needed , apply daily and wash off after 5 min, Disp: , Rfl:      lisinopril (PRINIVIL/ZESTRIL) 40 MG tablet, Take 1 tablet (40 mg) by mouth daily, Disp: 90 tablet, Rfl: 1     MAGNESIUM GLYCINATE PLUS PO, Take 400 mg by mouth 2 times daily, Disp: , Rfl:      metFORMIN (GLUCOPHAGE-XR) 500 MG 24 hr tablet, TAKE 2 TABLETS BY MOUTH TWICE DAILY WITH MEALS, Disp: 360 tablet, Rfl: 3     Multiple Vitamins-Minerals (MULTIVITAMIN PO), Take 1 tablet by mouth daily , Disp: , Rfl:      Omega-3 Fatty Acids (OMEGA-3 FISH OIL PO), Take 1 g by mouth 2 times daily (with meals) , Disp: , Rfl:      semaglutide (OZEMPIC, 1 MG/DOSE,) 2 MG/1.5ML pen, Inject 1 mg Subcutaneous every 7 days , on Wednesdays, Disp: 2 pen, Rfl: 11     tadalafil (CIALIS) 5 MG tablet, TAKE 1 TABLET  BY MOUTH EVERY DAY AS NEEDED, Disp: 30 tablet, Rfl: 1    Allergies:  Niacin and Simvastatin     Past Medical History:  Cerebral infarction  Chronic infection   H/O heartburn   History of heartburn   Hypertension   Numbness and tingling   Obesity   GILA (obstructive sleep apnea)   Renal stone   Type II Diabetes    Past Surgical History:  Amputated left foot and different toes bilaterally.   Recession Gastrocnemius     Social History:  He denies tobacco use and admits to rare alcohol use.     Family History:  Mother - Arthritis, Lupus, diabetes, cancer  Father - Diabetes  MGF - Diabetes  Sister - Diabetes    Physical Examination:  General: Healthy appearing male. Affect appropriate. Normal gait. Alert and oriented to surroundings.   Bilateral Upper Extremity:   Skin intact  No wounds  No erythema or joint effusions    Right:  Precentral cord in line with the wring finger as well as the first web space  There is also cord at the radial border of the small finger proximal phalanx with a 30 degree contracture of the PIP joint that he says is related to old injuries    Left:  Precentral cords in line with ring and small finger with no associated contracture  There is also a cord in the first webspace    Nontender throughout       Assessment:   59 year old male with bilateral Dupuytren's cords. Long-standing small finger PIP contracture that the patient reports is not related to his Dupuytrens. .     Plan:       We discussed his current clinical findings and the available treatment options. We discussed that treatment is not indicated in the absence of a contracture but also reviewed what the treatment options would be should a contracture develop. Discussed that some cords go on to a contracture, others do not. The patient does not have a contracture right now and so  I advised against any surgical or injection interventions at this time. If the patient does develop contracture or difficulty with flattening the  hand on a  table then the patient  should return to consider these options. We also discussed that he will keep an eye on the small finger PIP joint and will certainly notify me if this seems to be progressing. The patient  should also return to see me should there be any unexpeccted changes or or any other questions or concerns. I referred the patient  to the Dupuytren's [employer] website.  The patient will othewrise follow-up as needed.      Scribe Disclosure:  I, Frantz Blanco, am serving as a scribe to document services personally performed by Jonnathan Rodarte MD at this visit, based upon the provider's statements to me. All documentation has been reviewed by the aforementioned provider prior to being entered into the official medical record.

## 2020-03-02 NOTE — NURSING NOTE
Reason For Visit:   Chief Complaint   Patient presents with     Consult For     Bilateral dupuytren's contracture - right worse than left       Primary MD: Aden Lopez    ?  No    Age: 59 year old    Occupation: Seasonally as , and golf course .  Currently working? No. (out of season)  Work status?  Full time.  Date of surgery: NA  Type of surgery: NA.  Smoker: No  Request smoking cessation information: No      There were no vitals taken for this visit.      Pain Assessment  Patient Currently in Pain: Denies               QuickDASH Assessment  No flowsheet data found.       Allergies   Allergen Reactions     Niacin Swelling     SIMCOR caused edema in extremities     Simvastatin Swelling     SIMCOR caused edema in extremities       Amber Hunter, ATC

## 2020-03-02 NOTE — LETTER
3/2/2020       RE: Crispin Rojas  [address] [place] [address]     Dear Colleague,    Thank you for referring your patient, Crispin Rojas, to the [facility] at [facility]. Please see a copy of my visit note below.    [facility]  Orthopedics  Jonnathan Rodarte MD  2020     Name: Crispin Rojas  MRN: [id number]  Age: 59 year old  : 1960  Referring provider: Crispin Slaughter     Chief Complaint: Consult For (Bilateral dupuytren's contracture - right worse than left)    History of Present Illness:   Crispin Rojas is a 59 year old male who presents today for evaluation of bilateral Dupuytren's contracture. 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 He reports that he first noticed this 5 or 6 years ago. Occasionally the cords are tender.  He has not noticed any progressive contracture although he notes he has a chronic contracture of the small finger PIP joint from a childhood injury. He does do stretches for the hands that are helpful.  Recently he has begun to get bothersome cramps in his hands that happen everyday in the early afternoon. He notes his right hand cramps are worse than the left. His symptoms seems to fluctuate with temperature and symptoms are better when his hands are warmer.  He denies any family history of Dupuytren's. No further concerns at this time.     Review of Systems:   A 10-point review of systems was obtained and is negative except for as noted in the HPI.     Medications:     Current Outpatient Medications:      amLODIPine (NORVASC) 5 MG tablet, Take 1 tablet (5 mg) by mouth 2 times daily, Disp: 180 tablet, Rfl: 1     aspirin (ASA) 325 MG EC tablet, Take 325 mg by mouth daily, Disp: , Rfl:      atorvastatin (LIPITOR) 40 MG tablet, TAKE 1 TABLET BY MOUTH EVERY DAY, Disp: 90 tablet, Rfl: 2     blood glucose (NO BRAND SPECIFIED) lancets standard, Use to test blood  sugar 3 times daily or as directed., Disp: 300 each, Rfl: 3     blood glucose monitoring (NO BRAND SPECIFIED) meter device kit, Use to test blood sugar 3 times daily or as directed., Disp: 1 kit, Rfl: 0     blood glucose monitoring (NO BRAND SPECIFIED) test strip, Use to test blood sugars 3 times daily or as directed, Disp: 300 strip, Rfl: 3     Cholecalciferol (VITAMIN D3 PO), Take 1,000 Units by mouth daily , Disp: , Rfl:      Co-Enzyme Q10 100 MG CAPS, Take 100 mg by mouth daily , Disp: , Rfl:      collagenase (SANTYL) 250 UNIT/GM external ointment, Apply topically daily Apply to ulcer daily, Disp: 30 g, Rfl: 1     Continuous Blood Gluc Sensor (FREESTYLE LUCERO 14 DAY SENSOR) MISC, 1 each every 14 days, Disp: 2 each, Rfl: 11     gabapentin (NEURONTIN) 100 MG capsule, Take 300 mg by mouth 3 times daily, Disp: , Rfl:      insulin degludec (TRESIBA FLEXTOUCH) 100 UNIT/ML pen, Inject 55 Units Subcutaneous daily, Disp: 15 mL, Rfl: 11     insulin lispro (HUMALOG PEN) 100 UNIT/ML (1 unit dial) pen, 1 units per 9 grams of carbohydrate before each meal + 1:16 over 150 correction.  TDD 70 units, Disp: 45 mL, Rfl: 3     Insulin Pen Needle (PEN NEEDLES) 31G X 5 MM MISC, 1 Device 5 times daily , TWICE DAILY WITH LANTUS AND 3 TIMES A DAY PRN WITH NOVOLOG FLEXPEN, Disp: 200 each, Rfl: 11     ketoconazole (NIZORAL) 2 % external shampoo, APPLY EXTERNALLY 2 TIMES A WEEK AS DIRECTED, Disp: 120 mL, Rfl: 3     ketoconazole (NIZORAL) 2 % external shampoo, Apply topically daily as needed , apply daily and wash off after 5 min, Disp: , Rfl:      lisinopril (PRINIVIL/ZESTRIL) 40 MG tablet, Take 1 tablet (40 mg) by mouth daily, Disp: 90 tablet, Rfl: 1     MAGNESIUM GLYCINATE PLUS PO, Take 400 mg by mouth 2 times daily, Disp: , Rfl:      metFORMIN (GLUCOPHAGE-XR) 500 MG 24 hr tablet, TAKE 2 TABLETS BY MOUTH TWICE DAILY WITH MEALS, Disp: 360 tablet, Rfl: 3     Multiple Vitamins-Minerals (MULTIVITAMIN PO), Take 1 tablet by mouth daily , Disp:  , Rfl:      Omega-3 Fatty Acids (OMEGA-3 FISH OIL PO), Take 1 g by mouth 2 times daily (with meals) , Disp: , Rfl:      semaglutide (OZEMPIC, 1 MG/DOSE,) 2 MG/1.5ML pen, Inject 1 mg Subcutaneous every 7 days , on Wednesdays, Disp: 2 pen, Rfl: 11     tadalafil (CIALIS) 5 MG tablet, TAKE 1 TABLET BY MOUTH EVERY DAY AS NEEDED, Disp: 30 tablet, Rfl: 1    Allergies:  Niacin and Simvastatin     Past Medical History:  Cerebral infarction  Chronic infection   H/O heartburn   History of heartburn   Hypertension   Numbness and tingling   Obesity   GILA (obstructive sleep apnea)   Renal stone   Type II Diabetes    Past Surgical History:  Amputated left foot and different toes bilaterally.   Recession Gastrocnemius     Social History:  He denies tobacco use and admits to rare alcohol use.     Family History:  Mother - Arthritis, Lupus, diabetes, cancer  Father - Diabetes  MGF - Diabetes  Sister - Diabetes    Physical Examination:  General: Healthy appearing male. Affect appropriate. Normal gait. Alert and oriented to surroundings.   Bilateral Upper Extremity:   Skin intact  No wounds  No erythema or joint effusions    Right:  Precentral cord in line with the wring finger as well as the first web space  There is also cord at the radial border of the small finger proximal phalanx with a 30 degree contracture of the PIP joint that he says is related to old injuries    Left:  Precentral cords in line with ring and small finger with no associated contracture  There is also a cord in the first webspace    Nontender throughout     Assessment:   59 year old male with bilateral Dupuytren's cords. Long-standing small finger PIP contracture that the patient reports is not related to his Dupuytrens. .     Plan:       We discussed his current clinical findings and the available treatment options. We discussed that treatment is not indicated in the absence of a contracture but also reviewed what the treatment options would be should a  contracture develop. Discussed that some cords go on to a contracture, others do not. The patient does not have a contracture right now and so  I advised against any surgical or injection interventions at this time. If the patient does develop contracture or difficulty with flattening the  hand on a table then the patient  should return to consider these options. We also discussed that he will keep an eye on the small finger PIP joint and will certainly notify me if this seems to be progressing. The patient  should also return to see me should there be any unexpeccted changes or or any other questions or concerns. I referred the patient  to the Dupuytren's Foundation website.  The patient will othewrise follow-up as needed.    Scribe Disclosure:  I, Frantz Blanco, am serving as a scribe to document services personally performed by Jonnathan Rodarte MD at this visit, based upon the provider's statements to me. All documentation has been reviewed by the aforementioned provider prior to being entered into the official medical record.     Again, thank you for allowing me to participate in the care of your patient.      Sincerely,    Jonnathan Rodarte MD

## 2020-03-16 ENCOUNTER — TRANSFERRED RECORDS (OUTPATIENT)
Dept: HEALTH INFORMATION MANAGEMENT | Facility: CLINIC | Age: 60
End: 2020-03-16

## 2020-04-01 DIAGNOSIS — E11.42 TYPE 2 DIABETES MELLITUS WITH PERIPHERAL NEUROPATHY (H): ICD-10-CM

## 2020-04-01 RX ORDER — PEN NEEDLE, DIABETIC 30 GX3/16"
1 NEEDLE, DISPOSABLE MISCELLANEOUS
Qty: 200 EACH | Refills: 10 | Status: SHIPPED | OUTPATIENT
Start: 2020-04-01 | End: 2020-05-07

## 2020-04-10 DIAGNOSIS — I10 BENIGN ESSENTIAL HYPERTENSION: ICD-10-CM

## 2020-04-13 RX ORDER — LISINOPRIL 40 MG/1
TABLET ORAL
Qty: 90 TABLET | Refills: 0 | Status: SHIPPED | OUTPATIENT
Start: 2020-04-13 | End: 2020-07-27

## 2020-04-13 NOTE — TELEPHONE ENCOUNTER
Routing refill request to provider for review/approval because:  Blood pressure not below 140/90  BP Readings from Last 6 Encounters:   02/04/20 (!) 144/80   02/04/20 (!) 155/83   01/27/20 136/80   12/23/19 132/70   12/23/19 132/70   12/20/19 (!) 179/91     Patient given 6 months' worth 9 months ago.   Ivette Steven, BSN, RN, PHN

## 2020-04-21 ENCOUNTER — NURSE TRIAGE (OUTPATIENT)
Dept: FAMILY MEDICINE | Facility: CLINIC | Age: 60
End: 2020-04-21

## 2020-04-21 NOTE — TELEPHONE ENCOUNTER
Called pt, see triage note, agrees to protocol note    Additional Information    Negative: Shock suspected (e.g., cold/pale/clammy skin, too weak to stand, low BP, rapid pulse)    Negative: Difficult to awaken or acting confused (e.g., disoriented, slurred speech)    Negative: Sounds like a life-threatening emergency to the triager    Negative: Vomiting occurs only while coughing    Negative: Pregnant < 20 Weeks and nausea/vomiting began in early pregnancy (i.e., 4-8 weeks pregnant)    Negative: Chest pain    Negative: Headache is main symptom    Negative: SEVERE vomiting (e.g., 6 or more times/day)    Negative: MODERATE vomiting (e.g., 3 - 5 times/day) and age > 60    Negative: Vomiting contains bile (green color)    Negative: Vomiting red blood or black (coffee ground) material    Negative: Insulin-dependent diabetes and glucose > 240 mg/dl (13 mmol/l)    Negative: Recent head injury (within 3 days)    Negative: Recent abdominal injury (within 7 days)    Negative: Drinking very little and has signs of dehydration (e.g., no urine > 12 hours, very dry mouth, very lightheaded)    Negative: Constant abdominal pain lasting > 2 hours    Negative: High-risk adult (e.g., brain tumor, V-P shunt, hernia)    Negative: Severe pain in one eye    Negative: Patient sounds very sick or weak to the triager    Negative: Vomiting and abdomen looks much more swollen than usual    Negative: Fever > 103 F (39.4 C)    Negative: Fever > 101 F (38.3 C) and over 60 years of age    Negative: Fever > 100.0 F (37.8 C) and has a weak immune system (e.g., HIV positive, cancer chemo, organ transplant, splenectomy, chronic steroids)    Negative: Fever > 100.0 F (37.8 C) and bedridden (e.g., nursing home patient, stroke, chronic illness, recovering from surgery)    Negative: Taking any of the following medications: digoxin (Lanoxin), lithium, theophylline, phenytoin (Dilantin)    Negative: SEVERE headache and vomiting    MILD to MODERATE vomiting  "(e.g., 1-5 times/day) and lasts > 48 hours (2 days)    Answer Assessment - Initial Assessment Questions  1. VOMITING SEVERITY: \"How many times have you vomited in the past 24 hours?\"      - MILD:  1 - 2 times/day  2. ONSET: \"When did the vomiting begin?\"       Earlier today  3. FLUIDS: \"What fluids or food have you vomited up today?\" \"Have you been able to keep any fluids down?\"      Yes   4. ABDOMINAL PAIN: \"Are your having any abdominal pain?\" If yes : \"How bad is it and what does it feel like?\" (e.g., crampy, dull, intermittent, constant)       no  5. DIARRHEA: \"Is there any diarrhea?\" If so, ask: \"How many times today?\"       No   6. CONTACTS: \"Is there anyone else in the family with the same symptoms?\"       No   7. CAUSE: \"What do you think is causing your vomiting?\"      Works at [employer], recently on machinery   8. HYDRATION STATUS: \"Any signs of dehydration?\" (e.g., dry mouth [not only dry lips], too weak to stand) \"When did you last urinate?\"      No   9. OTHER SYMPTOMS: \"Do you have any other symptoms?\" (e.g., fever, headache, vertigo, vomiting blood or coffee grounds, recent head injury)      no  10. PREGNANCY: \"Is there any chance you are pregnant?\" \"When was your last menstrual period?\"        n/a    Protocols used: VOMITING-A-[place]    June Thomas RN, BSN  Message handled by Nurse Triage.    "

## 2020-04-21 NOTE — TELEPHONE ENCOUNTER
Please call Crispin he states 2 days ago experienced a dizzy spell and this am he complains of nausea and 2 episodes of vomiting.  [phone]

## 2020-05-07 ENCOUNTER — VIRTUAL VISIT (OUTPATIENT)
Dept: ENDOCRINOLOGY | Facility: CLINIC | Age: 60
End: 2020-05-07
Payer: COMMERCIAL

## 2020-05-07 DIAGNOSIS — Z79.4 TYPE 2 DIABETES MELLITUS WITH DIABETIC PERIPHERAL ANGIOPATHY AND GANGRENE, WITH LONG-TERM CURRENT USE OF INSULIN (H): ICD-10-CM

## 2020-05-07 DIAGNOSIS — E11.49 DIABETES MELLITUS TYPE 2 WITH NEUROLOGICAL MANIFESTATIONS (H): ICD-10-CM

## 2020-05-07 DIAGNOSIS — E11.52 TYPE 2 DIABETES MELLITUS WITH DIABETIC PERIPHERAL ANGIOPATHY AND GANGRENE, WITH LONG-TERM CURRENT USE OF INSULIN (H): ICD-10-CM

## 2020-05-07 DIAGNOSIS — E11.42 TYPE 2 DIABETES MELLITUS WITH PERIPHERAL NEUROPATHY (H): Primary | ICD-10-CM

## 2020-05-07 PROCEDURE — 99213 OFFICE O/P EST LOW 20 MIN: CPT | Mod: 95 | Performed by: CLINICAL NURSE SPECIALIST

## 2020-05-07 RX ORDER — FLASH GLUCOSE SENSOR
1 KIT MISCELLANEOUS
Qty: 2 EACH | Refills: 11 | Status: SHIPPED | OUTPATIENT
Start: 2020-05-07 | End: 2021-01-08

## 2020-05-07 RX ORDER — SEMAGLUTIDE 1.34 MG/ML
1 INJECTION, SOLUTION SUBCUTANEOUS
Qty: 2 PEN | Refills: 11 | Status: SHIPPED | OUTPATIENT
Start: 2020-05-07 | End: 2021-01-08

## 2020-05-07 RX ORDER — PEN NEEDLE, DIABETIC 30 GX3/16"
1 NEEDLE, DISPOSABLE MISCELLANEOUS 4 TIMES DAILY
Qty: 200 EACH | Refills: 10 | Status: SHIPPED | OUTPATIENT
Start: 2020-05-07 | End: 2021-01-08

## 2020-05-07 RX ORDER — METFORMIN HCL 500 MG
TABLET, EXTENDED RELEASE 24 HR ORAL
Qty: 360 TABLET | Refills: 3 | Status: SHIPPED | OUTPATIENT
Start: 2020-05-07 | End: 2021-01-08

## 2020-05-07 RX ORDER — INSULIN LISPRO 100 [IU]/ML
INJECTION, SOLUTION INTRAVENOUS; SUBCUTANEOUS
Qty: 45 ML | Refills: 3 | Status: SHIPPED | OUTPATIENT
Start: 2020-05-07 | End: 2021-01-08

## 2020-05-07 NOTE — PATIENT INSTRUCTIONS
"    Crispin,    Once you start wearing the Corwin again, you can also enter the Humalog doses - this is helpful to me when I review your report.    To enter the insulin dose, scan the sensor with your phone. Tap on the small pencil icon in the upper right hand corner (this icon does not appear until after you scan the sensor).    Tap on \"short acting insulin\" the arrow up to enter the number of units of Humalog you took.  Make sure you save the entry.    When the Corwin report is printed, it will show time and # of units taken for each injection.    Contact me or Kristie after you have been wearing the Corwin for two weeks so your Corwin report can be printed for my review.  I'll message you with any changes I recommend.    Take care,    Jasmin Arteaga, NP  Endocrinology    "

## 2020-05-07 NOTE — PROGRESS NOTES
"Crispin Rojas is a 60 year old male who is being evaluated via a billable video visit due to the COVID-19 pandemic.      The patient has been notified of following:     \"This video visit will be conducted via a call between you and your physician/provider. We have found that certain health care needs can be provided without the need for an in-person physical exam.  This service lets us provide the care you need with a video conversation.  If a prescription is necessary we can send it directly to your pharmacy.  If lab work is needed we can place an order for that and you can then stop by our lab to have the test done at a later time.    Video visits are billed at different rates depending on your insurance coverage.  Please reach out to your insurance provider with any questions.    If during the course of the call the physician/provider feels a video visit is not appropriate, you will not be charged for this service.\"    Patient has given verbal consent for Video visit? Yes    How would you like to obtain your AVS? SalvatoreIndianola    Patient would like the video invitation sent by: Send to e-mail at: [email or web address]    Will anyone else be joining your video visit? No        Video-Visit Details    Type of service:  Video Visit    Video Start Time: 4:40 pm  Video End Time: 4:57 pm    Originating Location (pt. Location): Home    Distant Location (provider location):  [facility]     Platform used for Video Visit: Preclick    Name: Crispin Rojas  Seen today for Diabetes (Last seen 2/4/2020).  HPI:  Crispin Rojas is a 60 year old male who presents via video visit for the management of:    1. Type 2 DM:  Originally diagnosed at the age of: 1997.  Insulin treated 15+ years.    Current Regimen: Metformin 1000 mg bid, Ozempic 1 mg weekly , Tresiba 55 units q am,  Humalog 1:9 .  Has not been correcting his blood sugar unless it is >200.   Hasn't been carb counting lately, just takes 18 units " "Humalog before meals - usually two per day.  Works 5:30 am to between 1:30 and 3:30.  Eats breakfast before work.  Doesn't eat again until after he is back home from work.    Started Bydureon 5/2018 - tolerated well.    Changed to Ozempic 6/2018 - feels Ozempic is working better  Using personal CGM - Corwin  Hospitalized 6/2019 for cellulitis and bilateral diabetic foot ulcers.  Healed now.    BS checks: 3-5 times per day, + Corwin  Meter Download: Unable to download Corwin - he hasn't been wearing the Corwin the past 2 weeks, hasn't had time to  sensors at the pharmacy since going back to work.  Corwin CGM download:     Previously discussed insulin pump therapy , not interested in pump therapy at this time.    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   He stopped am Humalog on work days and this helped decrease low blood sugars.    Has been traveling a great deal - eating out a lot, difficult to count carbs in restaurant food.  Says Corwin shows most blood sugars under 200.  Tends to \"under dose\" Humalog if unsure of carbs in the meal.    Complications:   Diabetes Complications  Description / Detail    Diabetic Retinopathy   + diabetic retinopathy, last exam 6/2019, also sees  retinal specialist annually   CAD / PAD  No   Neuropathy   Yes, Left 1st and 2nd toe amptuated. partial amputations of the right 1st and 4th toes   Nephropathy / Microalbuminuria  Yes, elevated microalbumin   Gastroparesis  No   Hypoglycemia Unawareness  No       2. Hypertension: Blood Pressure:   BP Readings from Last 3 Encounters:   02/04/20 (!) 144/80   02/04/20 (!) 155/83   01/27/20 136/80   .  Blood pressure medications include Amlodipine 10 mg bid, lisinopril 40 mg qd.   3. Hyperlipidemia: Takes Atorvastatin 40 mg qd for lipid control.    4. Prevention:  Flu Shot- 10/2019  Pneumovax- 11/2015  Opthalmology-annaully  Dental-recommend semiannually  ASA- 325 mg " every day.  Smoking-   PMH/PSH:  Past Medical History:   Diagnosis Date     Cerebral infarction (H)      Chronic infection      H/O heartburn      History of heartburn      Hypertension      Numbness and tingling      Obesity      GILA (obstructive sleep apnea) 10/22/2018     Renal stone      Type II or unspecified type diabetes mellitus without mention of complication, not stated as uncontrolled      Past Surgical History:   Procedure Laterality Date     AMPUTATE FOOT Left 8/18/2016    Procedure: AMPUTATE FOOT;  Surgeon: Jack Younger DPM;  Location: [facility] OR     AMPUTATE TOE(S) Right 2/1/2016    Procedure: AMPUTATE TOE(S);  Surgeon: Rachelle Manriquez DPM, Pod;  Location: [facility] OR     AMPUTATE TOE(S) Right 8/2/2019    Procedure: Right fourth toe partial amputation for treatment of osteomyelitis.;  Surgeon: Jack Younger DPM;  Location: [facility] OR     AMPUTATE TOE(S) Left 11/8/2019    Procedure: Left second toe amputation at the metatarsophalangeal joint.;  Surgeon: Rachelle Manriquez DPM, Podiatry/Foot and Ankle Surgery;  Location: [facility] OR     ANGIOGRAM       COLONOSCOPY       COMBINED CYSTOSCOPY, RETROGRADES, URETEROSCOPY, INSERT STENT Left 8/21/2016    Procedure: COMBINED CYSTOSCOPY, RETROGRADES, URETEROSCOPY, INSERT STENT;  Surgeon: Artemio Valenzuela MD;  Location: [facility] OR     COMBINED CYSTOSCOPY, RETROGRADES, URETEROSCOPY, LASER HOLMIUM LITHOTRIPSY URETER(S), INSERT STENT Left 10/3/2016    Procedure: COMBINED CYSTOSCOPY, RETROGRADES, URETEROSCOPY, LASER HOLMIUM LITHOTRIPSY URETER(S), INSERT STENT;  Surgeon: Artemio Valenzuela MD;  Location: [facility]     EXTRACORPOREAL SHOCK WAVE LITHOTRIPSY (ESWL) Left 9/8/2016    Procedure: EXTRACORPOREAL SHOCK WAVE LITHOTRIPSY (ESWL);  Surgeon: Artemio Valenzuela MD;  Location: [facility] OR     RECESSION GASTROCNEMIUS Right 2/1/2016    Procedure: RECESSION GASTROCNEMIUS;  Surgeon: Rachelle Manriquez DPM, Pod;  Location: [facility] OR     Family Hx:  Family History   Problem Relation Age  of Onset     Arthritis Mother         SLE     Connective Tissue Disorder Mother         lupus     Diabetes Mother      Cerebrovascular Disease Mother      Cancer Mother      Diabetes Father      Diabetes Maternal Grandfather      Diabetes Sister      Thyroid disease:          DM2: Yes: Strong family history on mothers side - mom, 7 maternal uncles, two maternal aunts, + MGF.         Autoimmune: DM1, SLE, RA, Vitiligo Yes: Lupus    Social Hx:  Social History     Socioeconomic History     Marital status:      Spouse name: Sydney     Number of children: 2     Years of education: Not on file     Highest education level: Master's degree (e.g., MA, MS, Arleth, MEd, MSW, ELIANA)   Occupational History     Occupation: marketing     Employer: [employer]     Comment: [employer]   Social Needs     Financial resource strain: Not very hard     Food insecurity     Worry: Never true     Inability: Never true     Transportation needs     Medical: No     Non-medical: No   Tobacco Use     Smoking status: Never Smoker     Smokeless tobacco: Never Used   Substance and Sexual Activity     Alcohol use: Yes     Alcohol/week: 0.0 standard drinks     Frequency: 2-4 times a month     Drinks per session: 1 or 2     Binge frequency: Never     Comment: rare---red wine 2x per month     Drug use: No     Sexual activity: Yes     Partners: Female   Lifestyle     Physical activity     Days per week: 0 days     Minutes per session: 0 min     Stress: Not on file   Relationships     Social connections     Talks on phone: Never     Gets together: Never     Attends Holiness service: More than 4 times per year     Active member of club or organization: No     Attends meetings of clubs or organizations: Never     Relationship status:      Intimate partner violence     Fear of current or ex partner: Not on file     Emotionally abused: Not on file     Physically abused: Not on file     Forced sexual activity: Not on file   Other Topics Concern      Parent/sibling w/ CABG, MI or angioplasty before 65F 55M? No   Social History Narrative     Not on file          MEDICATIONS:  has a current medication list which includes the following prescription(s): freestyle lilly 14 day sensor, insulin degludec, insulin lispro, pen needles, metformin, ozempic (1 mg/dose), amlodipine, aspirin, atorvastatin, blood glucose, blood glucose monitoring, blood glucose, cholecalciferol, co-enzyme q10, collagenase, gabapentin, ketoconazole, ketoconazole, lisinopril, magnesium, multiple vitamin, omega-3 fatty acids, and tadalafil.    ROS     ROS: 10 point ROS neg other than the symptoms noted above in the HPI.    Physical Exam   VS: There were no vitals taken for this visit.  GENERAL: NAD, well dressed, answering questions appropriately, appears stated age.  HEENT: no exopthalmous, no proptosis, no lig lag, no retraction, no scleral icterus  RESPIRATORY: Normal respiratory effort.  CARDIOVASCULAR:   NEUROLOGY: CN grossly intact, no observable tremors  MSK: grossly intact - limited exam via video  PSYCH: Intact judgment and insight. A&OX3 with a cordial affect.    LABS:  A1c:   Component      Latest Ref Rng & Units 8/1/2019 10/21/2019 2/4/2020   Hemoglobin A1C      0 - 5.6 % 6.8 (H) 5.9 (H) 8.8 (H)     Basic Metabolic Panel:  !COMPREHENSIVE Latest Ref Rng & Units 2/4/2020   SODIUM 133 - 144 mmol/L 139   POTASSIUM 3.4 - 5.3 mmol/L 4.9   CHLORIDE 94 - 109 mmol/L 107   CO2 mmol/L    BUN 7 - 30 mg/dL 20   Creatinine 0.66 - 1.25 mg/dL    Creatinine 0.66 - 1.25 mg/dL 0.89   Glucose 70 - 99 mg/dL 252 (H)   ANION GAP 3 - 14 mmol/L 8   CALCIUM 8.5 - 10.1 mg/dL 9.0     LFTS/Cholesterol Panel:  !LIPID/HEPATIC Latest Ref Rng & Units 10/21/2019   CHOLESTEROL <200 mg/dL 109   TRIGLYCERIDES <150 mg/dL 78   HDL CHOLESTEROL >39 mg/dL 38 (L)   LDL CHOLESTEROL DIRECT 0 - 129 mg/dL    LDL CHOLESTEROL, CALCULATED <100 mg/dL 55   VLDL-CHOLESTEROL 0 - 30 mg/dL    NON HDL CHOLESTEROL <130 mg/dL 71     Thyroid  Function:   !THYROID Latest Ref Rng & Units 12/27/2017   TSH 0.40 - 4.00 mU/L 1.78     Urine MicroAlbumin:  Component      Latest Ref Rng & Units 2/4/2020   Creatinine Urine      mg/dL 138   Albumin Urine mg/L      mg/L 145   Albumin Urine mg/g Cr      0 - 17 mg/g Cr 105.07 (H)     Vitamin D:    All pertinent notes, labs, and images personally reviewed by me.     A/P  Mr.Patrick AKASH Rojas is a 60 year old seen via video visit for the management of diabetes:    1. DM2 - ? Control unknown.  No blood sugar data available for review today.  Diabetes is complicated by diabetic retinopathy, nephropathy, and neuropathy with history of left 1st and 2nd toe amptuated. partial amputations of the right 1st and 4th toes  Monitoring blood sugars continuously via Corwin    He'll  the Corwin sensors at his pharmacy and start wearing the Corwin again.  Continue Metformin 1000 mg bid,  Continue Tresiba 55 units daily   Continue current dose of Humalog and Ozempic 1 mg daily.  Recommend a Humalog correction of 1:25>150.   Once he has been wearing his Corwin for two weeks, he'll contact us so we can download and print his Corwin report for review.  I'll let him know what changes I recommend based on results.  Recommend he enter Humalog insulin doses into the Corwin.  Obtain a1c within the next 1-2 weeks.      3. Hyperlipidemia - On statin therapy    Labs ordered today:   Orders Placed This Encounter   Procedures     Hemoglobin A1c       Radiology/Consults ordered today: None       Follow-up:  August 2020     Jasmin Arteaga NP  Endocrinology  [facility]  CC:

## 2020-05-12 DIAGNOSIS — E11.52 TYPE 2 DIABETES MELLITUS WITH DIABETIC PERIPHERAL ANGIOPATHY AND GANGRENE, WITH LONG-TERM CURRENT USE OF INSULIN (H): ICD-10-CM

## 2020-05-12 DIAGNOSIS — E11.49 DIABETES MELLITUS TYPE 2 WITH NEUROLOGICAL MANIFESTATIONS (H): ICD-10-CM

## 2020-05-12 DIAGNOSIS — E11.42 TYPE 2 DIABETES MELLITUS WITH PERIPHERAL NEUROPATHY (H): ICD-10-CM

## 2020-05-12 DIAGNOSIS — Z79.4 TYPE 2 DIABETES MELLITUS WITH DIABETIC PERIPHERAL ANGIOPATHY AND GANGRENE, WITH LONG-TERM CURRENT USE OF INSULIN (H): ICD-10-CM

## 2020-05-12 DIAGNOSIS — E78.5 HYPERLIPIDEMIA LDL GOAL <100: ICD-10-CM

## 2020-05-12 LAB — HBA1C MFR BLD: 7.8 % (ref 0–5.6)

## 2020-05-12 PROCEDURE — 83036 HEMOGLOBIN GLYCOSYLATED A1C: CPT | Performed by: CLINICAL NURSE SPECIALIST

## 2020-05-12 PROCEDURE — 36415 COLL VENOUS BLD VENIPUNCTURE: CPT | Performed by: CLINICAL NURSE SPECIALIST

## 2020-05-12 PROCEDURE — 84443 ASSAY THYROID STIM HORMONE: CPT | Performed by: CLINICAL NURSE SPECIALIST

## 2020-05-13 LAB — TSH SERPL DL<=0.005 MIU/L-ACNC: 1.32 MU/L (ref 0.4–4)

## 2020-05-13 RX ORDER — ATORVASTATIN CALCIUM 40 MG/1
TABLET, FILM COATED ORAL
Qty: 90 TABLET | Refills: 2 | Status: SHIPPED | OUTPATIENT
Start: 2020-05-13 | End: 2021-03-17

## 2020-05-13 NOTE — TELEPHONE ENCOUNTER
Routing refill request to provider for review/approval because:  Allergy warning    Javier Hendrickson RN, BSN

## 2020-05-14 NOTE — RESULT ENCOUNTER NOTE
Crispin,  Your A1c has improved from 8.8% to 7.8% so you are on the right track.  I would like to see your A1c in the low 7% range or high 6% range.  Let me know when you've been wearing your Corwin for two weeks and I will review the Corwin report and give you my recommendations.  Jasmin Arteaga NP  Endocrinology

## 2020-06-08 ENCOUNTER — TELEPHONE (OUTPATIENT)
Dept: ENDOCRINOLOGY | Facility: CLINIC | Age: 60
End: 2020-06-08

## 2020-06-08 NOTE — TELEPHONE ENCOUNTER
Fax from [facility], routed to , please advise PA or RX change, route to PA for processing    Pharmacy Benefit Information:    Provider: JOSHUA  Phone #: [phone]  ID#: [id number]  Medication/SIG: Ozempic  Pharmacy: [facility]    Also PA started via CMM: KEY: [id number]     Routed to , please advise RX CHANGE OR PA, ROUTE BACK FOR PROCESSING  June Thomas RN, BSN  Message handled by CLINIC NURSE.

## 2020-06-11 ENCOUNTER — TELEPHONE (OUTPATIENT)
Dept: SLEEP MEDICINE | Facility: CLINIC | Age: 60
End: 2020-06-11

## 2020-06-11 NOTE — TELEPHONE ENCOUNTER
PA medical justification  Dx: E11.42 - uncontrolled type 2 diabetes complicated by peripheral neuropathy with history of nonhealing diabetic foot ulcer and history of Left 1st and 2nd toe amptuation + partial amputations of the right 1st and 4th toes.  Diabetic retinopathy and diabetic nephropathy.  Currently treated with Metformin 1000 mg bid, Ozempic 1 mg weekly , Tresiba 55 units q am,  Humalog three times daily based on carb intake and correction .   Previously failed therapy with Bydureon, Byetta, Avandia, Glucovance.  A1c remained uncontrolled on Metformin, Tresiba, Humalog, and Bydureon despite several adjustments in his insulin dosing.  A1c on this regime was 8.8%  A1c has improved from 8.8% to 7.8% since changing from Bydureon to Ozempic.  Recommend he continue Ozempic for continuity of care, especially given his current diabetic complications and the need to prevent further worsening of his current complications or the development of further complications.  Jasmin Arteaga NP  Endocrinology

## 2020-06-11 NOTE — TELEPHONE ENCOUNTER
Central Prior Authorization Team   Phone: [phone]      PA Initiation    Medication: ozempic-Initiated  Insurance Company: [employer] - Phone [phone] Fax [phone]  Pharmacy Filling the Rx: [id number] [place], [place] - [address] [place] [address] [address] [facility] [address]  Filling Pharmacy Phone: [phone]  Filling Pharmacy Fax:    Start Date: 6/11/2020

## 2020-06-11 NOTE — TELEPHONE ENCOUNTER
Prior Authorization Approval    Authorization Effective Date: 6/9/2020  Authorization Expiration Date: 6/9/2021  Medication: ozempic-APPROVED  Approved Dose/Quantity:   Reference #:     Insurance Company: [employer] - Phone [phone] Fax [phone]  Expected CoPay:       CoPay Card Available:      Foundation Assistance Needed:    Which Pharmacy is filling the prescription (Not needed for infusion/clinic administered): [id number] [address] AT [facility] OF [facility] [address]  Pharmacy Notified: Yes  Patient Notified: No    Pharmacy will notify patient when medication is ready.

## 2020-06-11 NOTE — TELEPHONE ENCOUNTER
Reason for call:  Other   Patient called regarding (reason for call): appointment  Additional comments: Patient says the link to their video visit did not work for their appointment 06/11/20    Phone number to reach patient:  Cell number on file:    Telephone Information:   Mobile [phone]       Best Time:  any    Can we leave a detailed message on this number?  YES    Travel screening: Not Applicable

## 2020-06-12 NOTE — TELEPHONE ENCOUNTER
Left a message explaining to the patient that he needed to have the pre-charting portion of his video visit completed in order to have his appointment. Asked him to call us back to reschedule.

## 2020-07-14 ENCOUNTER — NURSE TRIAGE (OUTPATIENT)
Dept: FAMILY MEDICINE | Facility: CLINIC | Age: 60
End: 2020-07-14

## 2020-07-14 ENCOUNTER — APPOINTMENT (OUTPATIENT)
Dept: ULTRASOUND IMAGING | Facility: CLINIC | Age: 60
End: 2020-07-14
Attending: EMERGENCY MEDICINE
Payer: COMMERCIAL

## 2020-07-14 ENCOUNTER — HOSPITAL ENCOUNTER (EMERGENCY)
Facility: CLINIC | Age: 60
Discharge: HOME | End: 2020-07-14
Attending: EMERGENCY MEDICINE | Admitting: EMERGENCY MEDICINE
Payer: COMMERCIAL

## 2020-07-14 VITALS
WEIGHT: 235 LBS | RESPIRATION RATE: 16 BRPM | OXYGEN SATURATION: 98 % | BODY MASS INDEX: 31.83 KG/M2 | DIASTOLIC BLOOD PRESSURE: 69 MMHG | HEIGHT: 72 IN | TEMPERATURE: 98 F | HEART RATE: 82 BPM | SYSTOLIC BLOOD PRESSURE: 140 MMHG

## 2020-07-14 DIAGNOSIS — M79.89 LEFT LEG SWELLING: ICD-10-CM

## 2020-07-14 LAB
ALBUMIN SERPL-MCNC: 4 G/DL (ref 3.4–5)
ALP SERPL-CCNC: 59 U/L (ref 40–150)
ALT SERPL W P-5'-P-CCNC: 58 U/L (ref 0–70)
ANION GAP SERPL CALCULATED.3IONS-SCNC: 7 MMOL/L (ref 3–14)
AST SERPL W P-5'-P-CCNC: 40 U/L (ref 0–45)
BASOPHILS # BLD AUTO: 0.1 10E9/L (ref 0–0.2)
BASOPHILS NFR BLD AUTO: 0.8 %
BILIRUB SERPL-MCNC: 0.4 MG/DL (ref 0.2–1.3)
BUN SERPL-MCNC: 19 MG/DL (ref 7–30)
CALCIUM SERPL-MCNC: 9 MG/DL (ref 8.5–10.1)
CHLORIDE SERPL-SCNC: 107 MMOL/L (ref 94–109)
CO2 SERPL-SCNC: 27 MMOL/L (ref 20–32)
CREAT SERPL-MCNC: 0.94 MG/DL (ref 0.66–1.25)
D DIMER PPP FEU-MCNC: 0.3 UG/ML FEU (ref 0–0.5)
DIFFERENTIAL METHOD BLD: ABNORMAL
EOSINOPHIL # BLD AUTO: 0.3 10E9/L (ref 0–0.7)
EOSINOPHIL NFR BLD AUTO: 3.9 %
ERYTHROCYTE [DISTWIDTH] IN BLOOD BY AUTOMATED COUNT: 13 % (ref 10–15)
GFR SERPL CREATININE-BSD FRML MDRD: 89 ML/MIN/{1.73_M2}
GLUCOSE SERPL-MCNC: 198 MG/DL (ref 70–99)
HCT VFR BLD AUTO: 43.9 % (ref 40–53)
HGB BLD-MCNC: 13.8 G/DL (ref 13.3–17.7)
IMM GRANULOCYTES # BLD: 0 10E9/L (ref 0–0.4)
IMM GRANULOCYTES NFR BLD: 0.5 %
LYMPHOCYTES # BLD AUTO: 1.7 10E9/L (ref 0.8–5.3)
LYMPHOCYTES NFR BLD AUTO: 26.5 %
MCH RBC QN AUTO: 29.4 PG (ref 26.5–33)
MCHC RBC AUTO-ENTMCNC: 31.4 G/DL (ref 31.5–36.5)
MCV RBC AUTO: 93 FL (ref 78–100)
MONOCYTES # BLD AUTO: 0.4 10E9/L (ref 0–1.3)
MONOCYTES NFR BLD AUTO: 5.7 %
NEUTROPHILS # BLD AUTO: 4 10E9/L (ref 1.6–8.3)
NEUTROPHILS NFR BLD AUTO: 62.6 %
NRBC # BLD AUTO: 0 10*3/UL
NRBC BLD AUTO-RTO: 0 /100
NT-PROBNP SERPL-MCNC: 121 PG/ML (ref 0–900)
PLATELET # BLD AUTO: 161 10E9/L (ref 150–450)
POTASSIUM SERPL-SCNC: 3.8 MMOL/L (ref 3.4–5.3)
PROT SERPL-MCNC: 8 G/DL (ref 6.8–8.8)
RBC # BLD AUTO: 4.7 10E12/L (ref 4.4–5.9)
SODIUM SERPL-SCNC: 141 MMOL/L (ref 133–144)
TROPONIN I SERPL-MCNC: <0.015 UG/L (ref 0–0.04)
WBC # BLD AUTO: 6.5 10E9/L (ref 4–11)

## 2020-07-14 PROCEDURE — 83880 ASSAY OF NATRIURETIC PEPTIDE: CPT | Performed by: EMERGENCY MEDICINE

## 2020-07-14 PROCEDURE — 85379 FIBRIN DEGRADATION QUANT: CPT | Performed by: EMERGENCY MEDICINE

## 2020-07-14 PROCEDURE — 84484 ASSAY OF TROPONIN QUANT: CPT | Performed by: EMERGENCY MEDICINE

## 2020-07-14 PROCEDURE — 99284 EMERGENCY DEPT VISIT MOD MDM: CPT | Mod: 25

## 2020-07-14 PROCEDURE — 93971 EXTREMITY STUDY: CPT | Mod: LT

## 2020-07-14 PROCEDURE — 80053 COMPREHEN METABOLIC PANEL: CPT | Performed by: EMERGENCY MEDICINE

## 2020-07-14 PROCEDURE — 85025 COMPLETE CBC W/AUTO DIFF WBC: CPT | Performed by: EMERGENCY MEDICINE

## 2020-07-14 ASSESSMENT — ENCOUNTER SYMPTOMS
MYALGIAS: 1
SHORTNESS OF BREATH: 0
ACTIVITY CHANGE: 0

## 2020-07-14 ASSESSMENT — MIFFLIN-ST. JEOR: SCORE: 1923.95

## 2020-07-14 NOTE — ED PROVIDER NOTES
History     Chief Complaint:  Leg Swelling     HPI  Crispin Rojas is a 60 year old year old male with a history of hypertension, hyperlipidemia, diabetes mellitus, and thoracic aortic aneurysm who presents for evaluation of leg swelling. The patient came back from [place] last Monday and since then has noticed swelling in his legs, in his left more than his right. He normally has no leg swelling, so this is unusual for him. Heart problems run in his mother's side of the family, so about two and a half years ago he went to his [facility] who then sent him to a cardiologist who upon exam revealed that he has evidence of a possible previous heart attack. His job requires intense labor and he has not noticed any difference in his stamina, and any symptoms he has he just blames on the weather or dehydration. On his plane ride from [place] he states he had a left leg cramp that he could not get rid of. He denies trouble breathing, or being on blood thinners or diuretics.     Allergies:  Niacin  Simvastatin     Medications:   Norvasc  Aspirin 325  Lipitor  Cholecalciferol  Co-Enzyme Q10  Santyl  Neurontin  Zestril  Magnesium glycinate  Metformin  Semaglutide  Cialis    Medical History:   Cerebral infarction  Chronic infection  History of heartburn  Hypertension  Numbness and tingling  Obesity  Obstructive sleep apnea  Renal stone  Diabetes mellitus  Diabetic ulcer of lower extremity  Osteomyelitis  Cholecystitis  Cellulitis  Thoracic aortic aneurysm without rupture  Right bundle branch block  Hyperlipidemia  Cervicalgia  Morbid obesity  Other specified transient cerebral ischemias  Disorder of bursae and tendons in shoulder region    Surgical History:   Amputate foot  Amputate toe(s)  Angiogram  Colonoscopy  Combined cystoscopy, retrogrades, ureteroscopy, insert stent, left  Extracorporeal shock wave lithotripsy, left  Recession gastrocnemius    Family History:   Arthritis  Connective tissue disorder,  lupus  Diabetes  Cerebrovascular disease  Cancer    Social History:  Smoking Status: Negative  Smokeless Tobacco: Negative   Alcohol Use: Positive   Drug Use: Negative   Primary Physician: Aden Lopez     Review of Systems   Constitutional: Negative for activity change.   Respiratory: Negative for shortness of breath.    Cardiovascular: Positive for leg swelling.   Musculoskeletal: Positive for myalgias (left leg).   All other systems reviewed and are negative.    Physical Exam     Patient Vitals for the past 24 hrs:   BP Temp Temp src Pulse Resp SpO2 Height Weight   07/14/20 1444 (!) 158/75 98  F (36.7  C) Oral 75 18 97 % 1.829 m (6') 106.6 kg (235 lb)      Physical Exam  General: Patient is awake, alert and interactive when I enter the room  Head: The scalp, face, and head appear normal  Eyes: The pupils are equal, round, and reactive to light. Conjunctivae and sclerae are normal  CV: Regular rate. S1/S2. No murmurs.   Resp: Lungs are clear without wheezes or rales. No respiratory distress.   GI: Abdomen is soft, no rigidity, guarding, or rebound. No distension. No tenderness to palpation in any quadrant.     MS: Normal tone. Joints grossly normal without effusions.  Swelling in bilateral lower extremities worse on the left than the right.  No evidence of overlying erythema or warmth.  Skin: No rash or lesions noted. Normal capillary refill noted  Neuro: Speech is normal and fluent. Face is symmetric. Moving all extremities.   Psych:  Normal affect.  Appropriate interactions.    Emergency Department Course     ECG:  ECG taken at 1638, ECG read at 1647  Normal sinus rhythm  Left axis deviation  Right bundle branch block, new from previous but RBBB seen on 06/23/2018  Abnormal ECG  Rate 62 bpm. IN interval 196 ms. QRS duration 134 ms. QT/QTc 416/422 ms. P-R-T axes 43 -67 -19.    Imaging:  Radiology results were communicated with the patient who voiced understanding of the findings.    US Lower Extremity  Venous Duplex Left  1.  No deep venous thrombosis in the left lower extremity.    Reading per radiology     Laboratory:  Laboratory findings were communicated with the patient who voiced understanding of the findings.    Troponin (Collected 1633): <0.015  D Dimer (Collected 1633): 0.3  BNP: 121    CBC: WBC 6.5, HGB 13.8,   CMP: Glucose 198 (H) (Creatinine 0.94) o/w WNL     Emergency Department Course:    1556 Nursing notes and vitals reviewed.    1620 I performed an exam of the patient as documented above.     1638 EKG obtained as noted above.    1633 IV was inserted and blood was drawn for laboratory testing, results above.    1701 The patient was sent for US while in the emergency department, results above.     1741 Findings and plan explained to the Patient. Patient discharged home with instructions regarding supportive care, medications, and reasons to return. The importance of close follow-up was reviewed. The patient was prescribed as below.    Impression & Plan     Medical Decision Making:  Crispin Rojas is a 60 year old male who presents for evaluation of bilateral leg swelling worse on the left than the right.  No evidence of significant respiratory distress.  I have a low concern for PE or significant congestive heart failure. A broad differential was considered including Baker's cyst rupture, Baker's cyst, hematoma, rupture, compartment syndrome, muscle rupture, DVT, superficial thrombophlebitis, compression of the venous structures higher up in the abdomen and or leg cellulitis/ abscess, etc. Ultrasound is negative. D-Dimer is negative. US is negative for DVT.  There are no signs of compartment syndrome or other worrisome etiologies at this point so outpatient management is indicated.  I doubt more central causes of their swelling like renal failure, chf, liver disease, etc. They will elevate leg, do gentle dorsal flexion and plantar flexion motions, take Tylenol for pain, and avoid strenuous  activity. They should followup with his primary care doctor to discuss starting a diuretic.     Diagnosis:     ICD-10-CM    1. Left leg swelling  M79.89         Disposition:  Discharged to home.    Discharge Medications:  New Prescriptions    No medications on file       Scribe Disclosure:  I, Sarah Woo, am serving as a scribe at 4:17 PM on 7/14/2020 to document services personally performed by Erwin Hernandez MD based on my observations and the provider's statements to me.      Erwin Hernandez MD  07/14/20 8085

## 2020-07-14 NOTE — TELEPHONE ENCOUNTER
Patient calls, has had swelling in ankles since July 4th weekend, in which he made a round trip flight to [place]. Patient reports swelling gets worse as the day progresses, goes down at night, usually not completely to baseline though. Patient reports swelling is much worse on the left ankle, but right has had some swelling as well. Patient reports pitting edema on some areas of the ankle greater than a 1/4 inch, denies redness or increased warmth or coolness compared to surrounding tissue. Patient also reports chills, that started before the swelling in his legs, unclear if these are related. Patient reports that around the same time he developed the chills he had severe pain bilaterally in both calves, similar to cramps from dehydration but more severe. He reports pain lasted about 20-30 minutes. Patient reports about a year ago he had swelling in foot, which wound up developing into osteomyelitis and partial amputation of foot.     Based on this information, advised patient go to ED to be evaluated and treated. Patient agrees to plan of care and states he plans to take himself to ED. He states he feels safe driving himself.     Additional Information    Negative: Chest pain    Negative: Small area of swelling and followed an insect bite to the area    Negative: Followed a knee injury    Negative: Ankle or foot injury    Negative: Pregnant with leg swelling or edema    Negative: Difficulty breathing at rest    Negative: Entire foot is cool or blue in comparison to other side    Thigh, calf, or ankle swelling in both legs, but one side is definitely more swollen    Negative: Thigh, calf, or ankle swelling in only one leg    Negative: Thigh or calf pain and only 1 side and present > 1 hour    Negative: SEVERE swelling (e.g., swelling extends above knee, entire leg is swollen, weeping fluid)    Negative: Cast on leg or ankle and has increasing pain    Negative: Can't walk or can barely stand (new onset)     "Negative: Fever and red area (or area very tender to touch)    Negative: Patient sounds very sick or weak to the triager    Negative: Swelling of face, arm or hands (Exception: slight puffiness of fingers during hot weather)    Negative: Pregnant > 20 weeks and sudden weight gain (i.e., > 2 lbs, 1 kg in one week)    Negative: MODERATE swelling of both ankles (e.g., swelling extends up to the knees) AND new onset or worsening    Negative: Difficulty breathing with exertion AND worsening or new onset    Negative: Looks like a boil, infected sore, deep ulcer, or other infected rash (spreading redness, pus)    Negative: Patient wants to be seen    Negative: MILD swelling of both ankles (i.e., pedal edema) AND new onset or worsening    Negative: MILD swelling of both ankles (i.e., pedal edema) and caused by hot weather    Negative: Mild swelling of both ankles and varicose veins    Negative: Mild swelling of both ankles and chronic (unchanged)    Answer Assessment - Initial Assessment Questions  1. ONSET: \"When did the swelling start?\" (e.g., minutes, hours, days)      Around 4th of July weekend  2. LOCATION: \"What part of the leg is swollen?\"  \"Are both legs swollen or just one leg?\"      Both ankles are swollen, but left more so  3. SEVERITY: \"How bad is the swelling?\" (e.g., localized; mild, moderate, severe)   - Localized - small area of swelling localized to one leg   - MILD pedal edema - swelling limited to foot and ankle, pitting edema < 1/4 inch (6 mm) deep, rest and elevation eliminate most or all swelling   - MODERATE edema - swelling of lower leg to knee, pitting edema > 1/4 inch (6 mm) deep, rest and elevation only partially reduce swelling   - SEVERE edema - swelling extends above knee, facial or hand swelling present       Moderate swelling, \"really bad at night,\" pitting is greater than 1/4 inch on parts of ankle, but less on other parts  4. REDNESS: \"Does the swelling look red or infected?\"      no  5. " "PAIN: \"Is the swelling painful to touch?\" If so, ask: \"How painful is it?\"   (Scale 1-10; mild, moderate or severe)      3-4/10, pain is more of a fullness, not pain  6. FEVER: \"Do you have a fever?\" If so, ask: \"What is it, how was it measured, and when did it start?\"       no  7. CAUSE: \"What do you think is causing the leg swelling?\"      I have no idea  8. MEDICAL HISTORY: \"Do you have a history of heart failure, kidney disease, liver failure, or cancer?\"      Patient denies, chart review shows hx of CVA  9. RECURRENT SYMPTOM: \"Have you had leg swelling before?\" If so, ask: \"When was the last time?\" \"What happened that time?\"      Had infection in the foot, but this time he has no open sores. Last summer, antibiotics but wound up with bone infection and had partial amputation of left foot; chart review shows amputation of right fourth toe  10. OTHER SYMPTOMS: \"Do you have any other symptoms?\" (e.g., chest pain, difficulty breathing)        no  11. PREGNANCY: \"Is there any chance you are pregnant?\" \"When was your last menstrual period?\"        n/a    Protocols used: LEG SWELLING AND EDEMA-A-OH    Ivette Steven, JOSEN, RN, PHN    "

## 2020-07-14 NOTE — ED TRIAGE NOTES
ABCs intact. Pt c/o bilateral lower leg edema. Pt recently flew back from [place]. Denies cp, sob, cough, v/d, rash or sore throat.

## 2020-07-14 NOTE — ED AVS SNAPSHOT
[facility]  [address]  [facility] [address]  Phone:  [phone]  Fax:  [phone]                                    Crispin Rojas   MRN: [id number]    Department:  [facility]   Date of Visit:  7/14/2020           After Visit Summary Signature Page    I have received my discharge instructions, and my questions have been answered. I have discussed any challenges I see with this plan with the nurse or doctor.    ..........................................................................................................................................  Patient/Patient Representative Signature      ..........................................................................................................................................  Patient Representative Print Name and Relationship to Patient    ..................................................               ................................................  Date                                   Time    ..........................................................................................................................................  Reviewed by Signature/Title    ...................................................              ..............................................  Date                                               Time          22EPIC Rev 08/18

## 2020-07-15 ENCOUNTER — TELEPHONE (OUTPATIENT)
Dept: ENDOCRINOLOGY | Facility: CLINIC | Age: 60
End: 2020-07-15

## 2020-07-15 LAB — INTERPRETATION ECG - MUSE: NORMAL

## 2020-07-15 NOTE — TELEPHONE ENCOUNTER
General Call:   Who is calling:  Patient  Reason for Call:  Some questions  What are your questions or concerns:  Pt requesting to speak with Kristie specifically  Date of last appointment with provider: 05/07/2020  Okay to leave a detailed message:Yes at Cell number on file:    Telephone Information:   [employer] [phone]

## 2020-07-15 NOTE — TELEPHONE ENCOUNTER
Patient was assessed in ED, testing ruled out DVT, cardiac concerns, compartment syndrome, etc. they recommended seeing PCP around 7/17/20 to explore prescribing diuretic. Patient was scheduled to see A Donya during chart review. No further action required.   Ivette Steven, JOSEN, RN, PHN

## 2020-07-17 NOTE — TELEPHONE ENCOUNTER
Spoke with patient.  Confirmed with patient that his [employer] is not synced with [place].  Resent the invitation.  He will try to accept the invitation over the weekend and check in next week to see if he is connected.  He is using his cell phone to scan his [place] sensor.  Kristie Mares CMA on 7/17/2020 at 1:46 PM

## 2020-07-20 DIAGNOSIS — L21.9 SEBORRHEIC DERMATITIS: ICD-10-CM

## 2020-07-21 RX ORDER — KETOCONAZOLE 20 MG/ML
SHAMPOO TOPICAL
Qty: 120 ML | Refills: 3 | Status: SHIPPED | OUTPATIENT
Start: 2020-07-21 | End: 2020-07-29

## 2020-07-23 ENCOUNTER — HOSPITAL ENCOUNTER (OUTPATIENT)
Dept: CARDIOLOGY | Facility: CLINIC | Age: 60
Discharge: HOME | End: 2020-07-23
Attending: EMERGENCY MEDICINE | Admitting: EMERGENCY MEDICINE
Payer: COMMERCIAL

## 2020-07-23 DIAGNOSIS — M79.89 LEFT LEG SWELLING: ICD-10-CM

## 2020-07-23 PROCEDURE — 25800025 ZZH RX 258: Performed by: EMERGENCY MEDICINE

## 2020-07-23 PROCEDURE — 25500064 ZZH RX 255 OP 636: Performed by: EMERGENCY MEDICINE

## 2020-07-23 PROCEDURE — 93306 TTE W/DOPPLER COMPLETE: CPT | Mod: 26 | Performed by: INTERNAL MEDICINE

## 2020-07-23 RX ORDER — ACYCLOVIR 200 MG/1
30 CAPSULE ORAL ONCE
Status: COMPLETED | OUTPATIENT
Start: 2020-07-23 | End: 2020-07-23

## 2020-07-23 RX ADMIN — HUMAN ALBUMIN MICROSPHERES AND PERFLUTREN 3 ML: 10; .22 INJECTION, SOLUTION INTRAVENOUS at 15:30

## 2020-07-23 RX ADMIN — SODIUM CHLORIDE 30 ML: 9 INJECTION, SOLUTION INTRAMUSCULAR; INTRAVENOUS; SUBCUTANEOUS at 15:19

## 2020-07-27 ENCOUNTER — TELEPHONE (OUTPATIENT)
Dept: FAMILY MEDICINE | Facility: CLINIC | Age: 60
End: 2020-07-27

## 2020-07-27 DIAGNOSIS — I10 BENIGN ESSENTIAL HYPERTENSION: ICD-10-CM

## 2020-07-27 NOTE — TELEPHONE ENCOUNTER
Crispin sent in a request for his echocardiogram results.    I had an echo-cardiogram done on Thursday.  I was told I would be contacted later that day with results and that they would be on [facility] by the end of day Friday.  I was not contacted and the results have not been posted.       Crispin Rojas

## 2020-07-28 RX ORDER — LISINOPRIL 40 MG/1
40 TABLET ORAL DAILY
Qty: 90 TABLET | Refills: 3 | Status: SHIPPED | OUTPATIENT
Start: 2020-07-28 | End: 2021-09-30

## 2020-07-28 NOTE — TELEPHONE ENCOUNTER
Routing refill request to provider for review/approval because:  Elevated BP.     Julissa Escalante RN   [facility] -- Triage Nurse

## 2020-07-29 ENCOUNTER — OFFICE VISIT (OUTPATIENT)
Dept: FAMILY MEDICINE | Facility: CLINIC | Age: 60
End: 2020-07-29
Payer: COMMERCIAL

## 2020-07-29 VITALS
OXYGEN SATURATION: 96 % | RESPIRATION RATE: 16 BRPM | DIASTOLIC BLOOD PRESSURE: 75 MMHG | HEIGHT: 72 IN | HEART RATE: 61 BPM | BODY MASS INDEX: 32.38 KG/M2 | WEIGHT: 239.1 LBS | TEMPERATURE: 97.9 F | SYSTOLIC BLOOD PRESSURE: 161 MMHG

## 2020-07-29 DIAGNOSIS — I10 ESSENTIAL HYPERTENSION: Primary | ICD-10-CM

## 2020-07-29 DIAGNOSIS — M79.89 SWELLING OF BOTH LOWER EXTREMITIES: ICD-10-CM

## 2020-07-29 PROCEDURE — 99214 OFFICE O/P EST MOD 30 MIN: CPT | Performed by: FAMILY MEDICINE

## 2020-07-29 RX ORDER — AMLODIPINE BESYLATE 5 MG/1
5 TABLET ORAL 2 TIMES DAILY
Qty: 180 TABLET | Refills: 1 | Status: SHIPPED | OUTPATIENT
Start: 2020-07-29 | End: 2021-03-04

## 2020-07-29 RX ORDER — POTASSIUM CHLORIDE 750 MG/1
10 TABLET, EXTENDED RELEASE ORAL 2 TIMES DAILY
Qty: 5 TABLET | Refills: 3 | Status: SHIPPED | OUTPATIENT
Start: 2020-07-29 | End: 2022-05-25

## 2020-07-29 RX ORDER — FUROSEMIDE 20 MG
20 TABLET ORAL DAILY
Qty: 5 TABLET | Refills: 3 | Status: SHIPPED | OUTPATIENT
Start: 2020-07-29 | End: 2021-01-13

## 2020-07-29 ASSESSMENT — ANXIETY QUESTIONNAIRES
2. NOT BEING ABLE TO STOP OR CONTROL WORRYING: NOT AT ALL
GAD7 TOTAL SCORE: 0
3. WORRYING TOO MUCH ABOUT DIFFERENT THINGS: NOT AT ALL
7. FEELING AFRAID AS IF SOMETHING AWFUL MIGHT HAPPEN: NOT AT ALL
6. BECOMING EASILY ANNOYED OR IRRITABLE: NOT AT ALL
GAD7 TOTAL SCORE: 0
5. BEING SO RESTLESS THAT IT IS HARD TO SIT STILL: NOT AT ALL
GAD7 TOTAL SCORE: 0
4. TROUBLE RELAXING: NOT AT ALL
7. FEELING AFRAID AS IF SOMETHING AWFUL MIGHT HAPPEN: NOT AT ALL
1. FEELING NERVOUS, ANXIOUS, OR ON EDGE: NOT AT ALL

## 2020-07-29 ASSESSMENT — PATIENT HEALTH QUESTIONNAIRE - PHQ9
SUM OF ALL RESPONSES TO PHQ QUESTIONS 1-9: 0
SUM OF ALL RESPONSES TO PHQ QUESTIONS 1-9: 0
10. IF YOU CHECKED OFF ANY PROBLEMS, HOW DIFFICULT HAVE THESE PROBLEMS MADE IT FOR YOU TO DO YOUR WORK, TAKE CARE OF THINGS AT HOME, OR GET ALONG WITH OTHER PEOPLE: NOT DIFFICULT AT ALL

## 2020-07-29 ASSESSMENT — MIFFLIN-ST. JEOR: SCORE: 1938.58

## 2020-07-29 NOTE — PROGRESS NOTES
"Subjective     Crispin Rojas is a 60 year old male who presents to clinic today for the following health issues:    History of Present Illness        Vascular Disease:  He presents for follow up of vascular disease.  He never takes nitroglycerin. He takes daily aspirin.    He eats 0-1 servings of fruits and vegetables daily.He consumes 0 sweetened beverage(s) daily.He exercises with enough effort to increase his heart rate 60 or more minutes per day.  He exercises with enough effort to increase his heart rate 3 or less days per week.   He is taking medications regularly.        ED/UC Followup:    Facility:  [facility] ED  Date of visit: 07/14/2020  Reason for visit: left leg swelling  Current Status: Feel better.     No new problems since recently being seen in the ER at [facility] on 07/14/2020.  He was seen for chief concern of swelling of both legs, with left being greater than right.  Since being seen in ER, he has been trying very much to keep his lower extremities elevated.  No DVT was noted during his ER work-up, there was no lab evidence of a myocardial infarction, and his BNP and d-dimer labs were normal.  He has been trying to move his lower extremities more since being seen in the ER.  No medication treatment was prescribed.           Review of Systems   See history of present illness.  No recent problems with breathing such as cough or shortness of breath.  No complaint of chest discomfort.      Objective    BP (!) 161/75 (BP Location: Right arm, Patient Position: Chair, Cuff Size: Adult Large)   Pulse 61   Temp 97.9  F (36.6  C) (Oral)   Resp 16   Ht 1.822 m (5' 11.75\")   Wt 108.5 kg (239 lb 1.6 oz)   SpO2 96%   BMI 32.65 kg/m    Body mass index is 32.65 kg/m .  Physical Exam   Vital signs reviewed.  Patient is in no acute appearing distress.  Breathing appears nonlabored.  Patient is alert and oriented ×3.  He is very pleasant, making good eye contact.  No conversational dyspnea " noted.  Heart: Heart rate is regular without murmur.  Lungs: Lungs are clear to auscultation with good airflow bilaterally.  Lower extremity exam: Patient has mild to moderate edema to bilateral ankles and lower legs, being a little bit greater on the left side.  No areas of tenderness, to include negative Homans test bilaterally.  No areas of discoloration or increased skin temperature suggestive of cellulitis.          Assessment & Plan     1. Essential hypertension  Patient required refill of this medication at exam.  - amLODIPine (NORVASC) 5 MG tablet; Take 1 tablet (5 mg) by mouth 2 times daily  Dispense: 180 tablet; Refill: 1    2. Swelling of both lower extremities  Medication prescribed for as needed treatment of lower leg edema.  I emphasized that initial treatment using lower extremity elevation, and periodic movement of lower extremities is best.  Lower extremity compression garments can be considered in the future also.  - furosemide (LASIX) 20 MG tablet; Take 1 tablet (20 mg) by mouth daily  Dispense: 5 tablet; Refill: 3  - potassium chloride ER (KLOR-CON M) 10 MEQ CR tablet; Take 1 tablet (10 mEq) by mouth 2 times daily  Dispense: 5 tablet; Refill: 3       Patient Instructions   Movement when able, and elevation of leg is main treatment of swelling. Use the furosemide and potassium prescription if still significant swelling.      Patient Education     Leg Swelling in Both Legs    Swelling of the feet, ankles, and legs is called edema. It is caused by excess fluid that has collected in the tissues. Extra fluid in the body settles in the lowest part because of gravity. This is why the legs and feet are most affected.  Some of the causes for edema include:    Disease of the heart like congestive heart failure    Standing or sitting for long periods of time    Infection of the feet or legs    Blood pooling in the veins of your legs (venous insufficiency)    Dilated veins in your lower leg (varicose  veins)    Garters or other clothing that is tight on your legs. This will cause blood to pool in your legs because the clothing limits blood flow.    Some medicines such as hormones like birth control pills, some blood pressure medicines like calcium channel blockers (amlodipine) and steroids, some antidepressants like MAO inhibitors and tricyclics    Menstrual periods that cause you to retain fluids    Many types of renal disease    Liver failure or cirrhosis    Pregnancy, some swelling is normal, but a sudden increase in leg swelling or weight gain can be a sign of a dangerous complication of pregnancy    Poor nutrition    Thyroid disease  Medical treatment will depend on what is causing the swelling in your legs. Your healthcare provider may prescribe water pills (diuretics) to get rid of the extra fluid.  Home care  Follow these guidelines when caring for yourself at home:    Don't wear clothing like garters that is tight on your legs.    Keep your legs up while lying or sitting.    If infection, injury, or recent surgery is causing the swelling, stay off your legs as much as possible until symptoms get better.    If your healthcare provider says that your leg swelling is caused by venous insufficiency or varicose veins, don't sit or  one place for long periods of time. Take breaks and walk about every few hours. Brisk walking is a good exercise. It helps circulate the blood that has collected in your leg. Talk with your provider about using support stockings to stop daytime leg swelling.    If your provider says that heart disease is causing your leg swelling, follow a low-salt diet to stop extra fluid from staying in your body. You may also need medicine.  Follow-up care  Follow up with your healthcare provider, or as advised.  When to seek medical advice  Call your healthcare provider right away if any of these occur:    New shortness of breath or chest pain    Shortness of breath or chest pain that  gets worse    Swelling in both legs or ankles that gets worse    Swelling of the abdomen    Redness, warmth, or swelling in one leg    Fever of 100.4 F (38 C) or higher, or as directed by your healthcare provider    Yellow color to your skin or eyes    Rapid, unexplained weight gain    Having to sleep upright or use an increased number of pillows  Date Last Reviewed: 3/31/2016    2987-8141 [employer]. [address] [place] [address], [place] [address]. All rights reserved. This information is not intended as a substitute for professional medical care. Always follow your healthcare professional's instructions.               Return in about 1 month (around 8/29/2020) for Preventive Visit.    Johann Serna, DO  [facility]

## 2020-07-29 NOTE — PATIENT INSTRUCTIONS
Movement when able, and elevation of leg is main treatment of swelling. Use the furosemide and potassium prescription if still significant swelling.      Patient Education     Leg Swelling in Both Legs    Swelling of the feet, ankles, and legs is called edema. It is caused by excess fluid that has collected in the tissues. Extra fluid in the body settles in the lowest part because of gravity. This is why the legs and feet are most affected.  Some of the causes for edema include:    Disease of the heart like congestive heart failure    Standing or sitting for long periods of time    Infection of the feet or legs    Blood pooling in the veins of your legs (venous insufficiency)    Dilated veins in your lower leg (varicose veins)    Garters or other clothing that is tight on your legs. This will cause blood to pool in your legs because the clothing limits blood flow.    Some medicines such as hormones like birth control pills, some blood pressure medicines like calcium channel blockers (amlodipine) and steroids, some antidepressants like MAO inhibitors and tricyclics    Menstrual periods that cause you to retain fluids    Many types of renal disease    Liver failure or cirrhosis    Pregnancy, some swelling is normal, but a sudden increase in leg swelling or weight gain can be a sign of a dangerous complication of pregnancy    Poor nutrition    Thyroid disease  Medical treatment will depend on what is causing the swelling in your legs. Your healthcare provider may prescribe water pills (diuretics) to get rid of the extra fluid.  Home care  Follow these guidelines when caring for yourself at home:    Don't wear clothing like garters that is tight on your legs.    Keep your legs up while lying or sitting.    If infection, injury, or recent surgery is causing the swelling, stay off your legs as much as possible until symptoms get better.    If your healthcare provider says that your leg swelling is caused by venous  insufficiency or varicose veins, don't sit or  one place for long periods of time. Take breaks and walk about every few hours. Brisk walking is a good exercise. It helps circulate the blood that has collected in your leg. Talk with your provider about using support stockings to stop daytime leg swelling.    If your provider says that heart disease is causing your leg swelling, follow a low-salt diet to stop extra fluid from staying in your body. You may also need medicine.  Follow-up care  Follow up with your healthcare provider, or as advised.  When to seek medical advice  Call your healthcare provider right away if any of these occur:    New shortness of breath or chest pain    Shortness of breath or chest pain that gets worse    Swelling in both legs or ankles that gets worse    Swelling of the abdomen    Redness, warmth, or swelling in one leg    Fever of 100.4 F (38 C) or higher, or as directed by your healthcare provider    Yellow color to your skin or eyes    Rapid, unexplained weight gain    Having to sleep upright or use an increased number of pillows  Date Last Reviewed: 3/31/2016    1351-1458 [employer]. [address] [place] [address]. All rights reserved. This information is not intended as a substitute for professional medical care. Always follow your healthcare professional's instructions.

## 2020-07-30 ASSESSMENT — PATIENT HEALTH QUESTIONNAIRE - PHQ9: SUM OF ALL RESPONSES TO PHQ QUESTIONS 1-9: 0

## 2020-07-30 ASSESSMENT — ANXIETY QUESTIONNAIRES: GAD7 TOTAL SCORE: 0

## 2020-08-03 ENCOUNTER — VIRTUAL VISIT (OUTPATIENT)
Dept: FAMILY MEDICINE | Facility: OTHER | Age: 60
End: 2020-08-03
Payer: COMMERCIAL

## 2020-08-03 PROCEDURE — 99421 OL DIG E/M SVC 5-10 MIN: CPT | Performed by: INTERNAL MEDICINE

## 2020-08-03 NOTE — PROGRESS NOTES
"Date: 2020 16:32:06  Clinician: Elina Sood  Clinician NPI: [id number]  Patient: Crispin Rojas  Patient : 1960  Patient Address: [address] [place] [address]  Patient Phone: [phone]  Visit Protocol: URI  Patient Summary:  Crispin is a 60 year old ( : 1960 ) male who initiated a Visit for COVID-19 (Coronavirus) evaluation and screening. When asked the question \"Please sign me up to receive news, health information and promotions from [employer].\", Crispin responded \"No\".    Crispin states his symptoms started suddenly 3-4 days ago.   His symptoms consist of nausea, tooth pain, diarrhea, myalgia, a cough, rhinitis, malaise, and chills. He is experiencing mild difficulty breathing with activities but can speak normally in full sentences. Crispin also feels feverish.   Symptom details     Nasal secretions: The color of his mucus is clear.    Cough: Crispin coughs a few times an hour and his cough is not more bothersome at night. Phlegm does not come into his throat when he coughs. He does not believe his cough is caused by post-nasal drip.     Temperature: His current temperature is 100.7 degrees Fahrenheit. Crispin has had a temperature over 100 degrees Fahrenheit for 1-2 days.     Tooth pain: The tooth pain is not caused by a cavity, recent dental work, or other mouth problems.      Crispin denies having wheezing, ageusia, sore throat, anosmia, facial pain or pressure, nasal congestion, vomiting, ear pain, and headache. He also denies having recent facial or sinus surgery in the past 60 days, double sickening (worsening symptoms after initial improvement), and taking antibiotic medication in the past month.   Precipitating events  He has recently been exposed to someone with influenza. Crispin has not been in close contact with any high risk individuals.   Pertinent COVID-19 (Coronavirus) information  In the past 14 days, Crispin has not worked in a congregate living " setting.   He does not work or volunteer as healthcare worker or a  and does not work or volunteer in a healthcare facility.   Crispin also has not lived in a congregate living setting in the past 14 days. He does not live with a healthcare worker.   Crispin has not had a close contact with a laboratory-confirmed COVID-19 patient within 14 days of symptom onset.   Pertinent medical history  Crispin does not need a return to work/school note.   Weight: 239 lbs   Crispin does not smoke or use smokeless tobacco.   Additional information as reported by the patient (free text): This may have started a couple of nights ago.  While eating dinner I could not keep my head up.  I kept falling asleep.  Last night I was out walking with a mask on and I couldn't breath.  I have warn the mask before without any issues.  I blamed the mask for my inability to breath.  This morning I woke up with a cough.  A dry cough with no  mucus.  This afternoon I had the chills and elected to take my temperature and it was 100.7.  My boss left work today with what he termed as a cold.   Weight: 239 lbs  Reason for repeat visit for the same protocol within 24 hours:  Because your questions do not pertain to my symptoms.  I was told to start a new one.  See the History of referred by protocol and completed visits section for details on previous visits (visits currently in queue to be diagnosed will not appear in this section).    MEDICATIONS: Ozempic subcutaneous, Fish Oil Extra Strength oral, multivitamin-iron-folic acid-calcium and minerals oral, CoQ-10 oral, atorvastatin oral, amlodipine-benazepril oral, lisinopril oral, magnesium glycinate (bulk), vitamin D3-vitamin K-berberine-hops oral, aspirin oral, Tresiba FlexTouch U-100 subcutaneous, Humalog U-100 Insulin subcutaneous, metformin oral, ALLERGIES: NKDA  Clinician Response:  Dear Crispin,   Your symptoms show that you may have coronavirus (COVID-19). This illness can cause  "fever, cough and trouble breathing. Many people get a mild case and get better on their own. Some people can get very sick.  What should I do?  We would like to test you for this virus.   1. Please call [phone] to schedule your visit. Explain that you were referred by [facility] to have a COVID-19 test. Be ready to share your [facility] visit ID number.  The following will serve as your written order for this COVID Test, ordered by me, for the indication of suspected COVID [Z20.828]: The test will be ordered in [employer], our electronic health record, after you are scheduled. It will show as ordered and authorized by Raul Weber MD.  Order: COVID-19 (Coronavirus) PCR for SYMPTOMATIC testing from [facility].      2. When it's time for your COVID test:  Stay at least 6 feet away from others. (If someone will drive you to your test, stay in the backseat, as far away from the  as you can.)   Cover your mouth and nose with a mask, tissue or washcloth.  Go straight to the testing site. Don't make any stops on the way there or back.      3.Starting now: Stay home and away from others (self-isolate) until:   You've had no fever---and no medicine that reduces fever---for 3 full days (72 hours). And...   Your other symptoms have gotten better. For example, your cough or breathing has improved. And...   At least 10 days have passed since your symptoms started.       During this time, don't leave the house except for testing or medical care.   Stay in your own room, even for meals. Use your own bathroom if you can.   Stay away from others in your home. No hugging, kissing or shaking hands. No visitors.  Don't go to work, school or anywhere else.    Clean \"high touch\" surfaces often (doorknobs, counters, handles, etc.). Use a household cleaning spray or wipes. You'll find a full list of  on the EPA website: www.epa.gov/pesticide-registration/list-n-disinfectants-use-against-sars-cov-2.   Cover your mouth and nose with a mask, " tissue or washcloth to avoid spreading germs.  Wash your hands and face often. Use soap and water.  Caregivers in these groups are at risk for severe illness due to COVID-19:  o People 65 years and older  o People who live in a nursing home or long-term care facility  o People with chronic disease (lung, heart, cancer, diabetes, kidney, liver, immunologic)  o People who have a weakened immune system, including those who:   Are in cancer treatment  Take medicine that weakens the immune system, such as corticosteroids  Had a bone marrow or organ transplant  Have an immune deficiency  Have poorly controlled HIV or AIDS  Are obese (body mass index of 40 or higher)  Smoke regularly   o Caregivers should wear gloves while washing dishes, handling laundry and cleaning bedrooms and bathrooms.  o Use caution when washing and drying laundry: Don't shake dirty laundry, and use the warmest water setting that you can.  o For more tips, go to www.cdc.gov/coronavirus/2019-ncov/downloads/10Things.pdf.       How can I take care of myself?   Get lots of rest. Drink extra fluids (unless a doctor has told you not to).   Take Tylenol (acetaminophen) for fever or pain. If you have liver or kidney problems, ask your family doctor if it's okay to take Tylenol.   Adults can take either:    650 mg (two 325 mg pills) every 4 to 6 hours, or...   1,000 mg (two 500 mg pills) every 8 hours as needed.    Note: Don't take more than 3,000 mg in one day. Acetaminophen is found in many medicines (both prescribed and over-the-counter medicines). Read all labels to be sure you don't take too much.   For children, check the Tylenol bottle for the right dose. The dose is based on the child's age or weight.    If you have other health problems (like cancer, heart failure, an organ transplant or severe kidney disease): Call your specialty clinic if you don't feel better in the next 2 days.       Know when to call 911. Emergency warning signs include:     Trouble breathing or shortness of breath Pain or pressure in the chest that doesn't go away Feeling confused like you haven't felt before, or not being able to wake up Bluish-colored lips or face.  Where can I get more information?   [facility] -- About COVID-19: [email or web address]   CDC -- What to Do If You're Sick: www.cdc.gov/coronavirus/2019-ncov/about/steps-when-sick.html   CDC -- Ending Home Isolation: www.cdc.gov/coronavirus/2019-ncov/hcp/disposition-in-home-patients.html   CDC -- Caring for Someone: www.cdc.gov/coronavirus/2019-ncov/if-you-are-sick/care-for-someone.html   [facility] -- Interim Guidance for Hospital Discharge to Home: www.[facility].[place]/diseases/coronavirus/hcp/hospdischarge.pdf   [facility] clinical trials (COVID-19 research studies): clinicalaffairs.[facility]-clinical-trials    Below are the COVID-19 hotlines at the [place] Department of Health ([facility]). Interpreters are available.    For health questions: Call 541-529-5794 or 1-597.609.4252 (7 a.m. to 7 p.m.) For questions about schools and childcare: Call 141-122-2806 or 1-157.429.9009 (7 a.m. to 7 p.m.)    Diagnosis: Cough  Diagnosis ICD: R05

## 2020-08-04 DIAGNOSIS — Z20.822 SUSPECTED COVID-19 VIRUS INFECTION: Primary | ICD-10-CM

## 2020-08-05 DIAGNOSIS — Z20.822 SUSPECTED COVID-19 VIRUS INFECTION: ICD-10-CM

## 2020-08-05 PROCEDURE — U0003 INFECTIOUS AGENT DETECTION BY NUCLEIC ACID (DNA OR RNA); SEVERE ACUTE RESPIRATORY SYNDROME CORONAVIRUS 2 (SARS-COV-2) (CORONAVIRUS DISEASE [COVID-19]), AMPLIFIED PROBE TECHNIQUE, MAKING USE OF HIGH THROUGHPUT TECHNOLOGIES AS DESCRIBED BY CMS-2020-01-R: HCPCS | Performed by: FAMILY MEDICINE

## 2020-08-06 LAB
SARS-COV-2 RNA SPEC QL NAA+PROBE: ABNORMAL
SPECIMEN SOURCE: ABNORMAL

## 2020-08-10 ENCOUNTER — VIRTUAL VISIT (OUTPATIENT)
Dept: FAMILY MEDICINE | Facility: CLINIC | Age: 60
End: 2020-08-10
Payer: COMMERCIAL

## 2020-08-10 DIAGNOSIS — U07.1 PNEUMONIA DUE TO 2019 NOVEL CORONAVIRUS: Primary | ICD-10-CM

## 2020-08-10 DIAGNOSIS — M54.2 CERVICALGIA: ICD-10-CM

## 2020-08-10 DIAGNOSIS — E11.42 TYPE 2 DIABETES MELLITUS WITH PERIPHERAL NEUROPATHY (H): ICD-10-CM

## 2020-08-10 DIAGNOSIS — E11.49 DIABETES MELLITUS TYPE 2 WITH NEUROLOGICAL MANIFESTATIONS (H): ICD-10-CM

## 2020-08-10 DIAGNOSIS — J12.82 PNEUMONIA DUE TO 2019 NOVEL CORONAVIRUS: Primary | ICD-10-CM

## 2020-08-10 PROCEDURE — 99214 OFFICE O/P EST MOD 30 MIN: CPT | Mod: 95 | Performed by: FAMILY MEDICINE

## 2020-08-10 NOTE — PROGRESS NOTES
"Crispin Rojas is a 60 year old male who is being evaluated via a billable video visit.      The patient has been notified of following:   CONVERTED TO TELEPHONE VISIT, UNABLE TO CONNECT   \"This video visit will be conducted via a call between you and your physician/provider. We have found that certain health care needs can be provided without the need for an in-person physical exam.  This service lets us provide the care you need with a video conversation.  If a prescription is necessary we can send it directly to your pharmacy.  If lab work is needed we can place an order for that and you can then stop by our lab to have the test done at a later time.    Video visits are billed at different rates depending on your insurance coverage.  Please reach out to your insurance provider with any questions.    If during the course of the call the physician/provider feels a video visit is not appropriate, you will not be charged for this service.\"    Patient has given verbal consent for Video visit? Yes  How would you like to obtain your AVS? MyChart  If you are dropped from the video visit, the video invite should be resent to: Email :[email or web address] Text to cell phone: [phone]  Will anyone else be joining your video visit? No  TESTED POSITIVE   Subjective     Crispin Rojas is a 60 year old male who presents today via video visit for the following health issues:    HPI    Medication Followup    Taking Medication as prescribed: yes    Side Effects:  no    Medication Helping Symptoms:  Not sure    Pt also want to speak about postive to Covid. Pt is having dizziness and fever. Pt says temp running between 100f -101f taken temporal.  Past Medical History:   Diagnosis Date     Cerebral infarction (H)      Chronic infection      H/O heartburn      History of heartburn      Hypertension      Numbness and tingling      Obesity      GILA (obstructive sleep apnea) 10/22/2018     Renal stone      Type II or " unspecified type diabetes mellitus without mention of complication, not stated as uncontrolled        Past Surgical History:   Procedure Laterality Date     AMPUTATE FOOT Left 8/18/2016    Procedure: AMPUTATE FOOT;  Surgeon: Jack Younger DPM;  Location: [facility]     AMPUTATE TOE(S) Right 2/1/2016    Procedure: AMPUTATE TOE(S);  Surgeon: Rachelle Manriquez DPM, Pod;  Location: [facility]     AMPUTATE TOE(S) Right 8/2/2019    Procedure: Right fourth toe partial amputation for treatment of osteomyelitis.;  Surgeon: Jack Younger DPM;  Location: [facility]     AMPUTATE TOE(S) Left 11/8/2019    Procedure: Left second toe amputation at the metatarsophalangeal joint.;  Surgeon: Rachelle Manriquez DPM, Podiatry/Foot and Ankle Surgery;  Location: [facility]     ANGIOGRAM       COLONOSCOPY       COMBINED CYSTOSCOPY, RETROGRADES, URETEROSCOPY, INSERT STENT Left 8/21/2016    Procedure: COMBINED CYSTOSCOPY, RETROGRADES, URETEROSCOPY, INSERT STENT;  Surgeon: Artemio Valenzuela MD;  Location: [facility]     COMBINED CYSTOSCOPY, RETROGRADES, URETEROSCOPY, LASER HOLMIUM LITHOTRIPSY URETER(S), INSERT STENT Left 10/3/2016    Procedure: COMBINED CYSTOSCOPY, RETROGRADES, URETEROSCOPY, LASER HOLMIUM LITHOTRIPSY URETER(S), INSERT STENT;  Surgeon: Artemio Valenzuela MD;  Location: [facility]     EXTRACORPOREAL SHOCK WAVE LITHOTRIPSY (ESWL) Left 9/8/2016    Procedure: EXTRACORPOREAL SHOCK WAVE LITHOTRIPSY (ESWL);  Surgeon: Artemio Valenzuela MD;  Location: [facility]     RECESSION GASTROCNEMIUS Right 2/1/2016    Procedure: RECESSION GASTROCNEMIUS;  Surgeon: Rachelle Manriquez DPM, Pod;  Location: [facility]       Family History   Problem Relation Age of Onset     Arthritis Mother         SLE     Connective Tissue Disorder Mother         lupus     Diabetes Mother      Cerebrovascular Disease Mother      Cancer Mother      Diabetes Father      Diabetes Maternal Grandfather      Diabetes Sister        Social History     Tobacco Use     Smoking status: Never  Smoker     Smokeless tobacco: Never Used   Substance Use Topics     Alcohol use: Yes     Alcohol/week: 0.0 standard drinks     Frequency: 2-4 times a month     Drinks per session: 1 or 2     Binge frequency: Never     Comment: rare---red wine 2x per month   (U07.1,  J12.89) Pneumonia due to 2019 novel coronavirus  (primary encounter diagnosis)  Comment:   Plan:     (E11.42) Type 2 diabetes mellitus with peripheral neuropathy (H)  Comment:   Plan: slow progress     (E11.49) 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)  Comment:   Plan:     (M54.2) Cervicalgia  Comment:   Plan: neck pain and chest wall pain           Video Start Time: 2:40    Past Medical History:   Diagnosis Date     Cerebral infarction (H)      Chronic infection      H/O heartburn      History of heartburn      Hypertension      Numbness and tingling      Obesity      GILA (obstructive sleep apnea) 10/22/2018     Renal stone      Type II or unspecified type diabetes mellitus without mention of complication, not stated as uncontrolled        Past Surgical History:   Procedure Laterality Date     AMPUTATE FOOT Left 8/18/2016    Procedure: AMPUTATE FOOT;  Surgeon: Jack Younger DPM;  [facility]     AMPUTATE TOE(S) Right 2/1/2016    Procedure: AMPUTATE TOE(S);  Surgeon: Rachelle Manriquez DPM, Pod;  [facility]     AMPUTATE TOE(S) Right 8/2/2019    Procedure: Right fourth toe partial amputation for treatment of osteomyelitis.;  Surgeon: Jack Younger DPM;  [facility]: [facility]     AMPUTATE TOE(S) Left 11/8/2019    Procedure: Left second toe amputation at the metatarsophalangeal joint.;  Surgeon: Rachelle Manriquez DPM, Podiatry/Foot and Ankle Surgery;  Location: [facility]     ANGIOGRAM       COLONOSCOPY       COMBINED CYSTOSCOPY, RETROGRADES, URETEROSCOPY, INSERT STENT Left 8/21/2016    Procedure: COMBINED CYSTOSCOPY, RETROGRADES, URETEROSCOPY, INSERT STENT;  Surgeon: Artemio Valenzuela MD;  [facility]     COMBINED  CYSTOSCOPY, RETROGRADES, URETEROSCOPY, LASER HOLMIUM LITHOTRIPSY URETER(S), INSERT STENT Left 10/3/2016    Procedure: COMBINED CYSTOSCOPY, RETROGRADES, URETEROSCOPY, LASER HOLMIUM LITHOTRIPSY URETER(S), INSERT STENT;  Surgeon: Artemio Valenzuela MD;  [facility]     EXTRACORPOREAL SHOCK WAVE LITHOTRIPSY (ESWL) Left 9/8/2016    Procedure: EXTRACORPOREAL SHOCK WAVE LITHOTRIPSY (ESWL);  Surgeon: Artemio Valenzuela MD;  Location: [facility]     RECESSION GASTROCNEMIUS Right 2/1/2016    Procedure: RECESSION GASTROCNEMIUS;  Surgeon: Rachelle Manriquez, DPM, Pod;  Location: [facility]       Family History   Problem Relation Age of Onset     Arthritis Mother         SLE     Connective Tissue Disorder Mother         lupus     Diabetes Mother      Cerebrovascular Disease Mother      Cancer Mother      Diabetes Father      Diabetes Maternal Grandfather      Diabetes Sister        Social History     Tobacco Use     Smoking status: Never Smoker     Smokeless tobacco: Never Used   Substance Use Topics     Alcohol use: Yes     Alcohol/week: 0.0 standard drinks     Frequency: 2-4 times a month     Drinks per session: 1 or 2     Binge frequency: Never     Comment: rare---red wine 2x per month         Reviewed and updated as needed this visit by Provider         Review of Systems   Constitutional, HEENT, cardiovascular, pulmonary, GI, , musculoskeletal, neuro, skin, endocrine and psych systems are negative, except as otherwise noted.      Objective             Physical Exam     GENERAL: Healthy, alert and no distress  EYES: Eyes grossly normal to inspection.  No discharge or erythema, or obvious scleral/conjunctival abnormalities.  RESP: No audible wheeze, cough, or visible cyanosis.  No visible retractions or increased work of breathing.    SKIN: Visible skin clear. No significant rash, abnormal pigmentation or lesions.  NEURO: Cranial nerves grossly intact.  Mentation and speech appropriate for age.  PSYCH: Mentation appears  "normal, affect normal/bright, judgement and insight intact, normal speech and appearance well-groomed.      Diagnostic Test Results:  Labs reviewed in Epic        Assessment & Plan   Assessment  DIABETES TYPE 2, insulin dependent, slightly worsened. 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 Associated with the following complications: Atherosclerosis of the extremities with intermittent claudication      Plan  There are no diagnoses linked to this encounter.He works on his feet at the [employer]     BMI:   Estimated body mass index is 32.65 kg/m  as calculated from the following:    Height as of 7/29/20: 1.822 m (5' 11.75\").    Weight as of 7/29/20: 108.5 kg (239 lb 1.6 oz).   Weight management plan: Discussed healthy diet and exercise guidelines          Work on weight loss  Regular exercise    No follow-ups on file.    Aden Lopez MD  [facility] [employer]          Video-Visit Details    Type of service:  Video Visit    Video End Time:3:10    Originating Location (pt. Location): Home    Distant Location (provider location):  [facility] [employer]     Platform used for Video Visit: Other: telephone    No follow-ups on file.       Aden Lopez MD      "

## 2020-08-18 ENCOUNTER — TELEPHONE (OUTPATIENT)
Dept: ENDOCRINOLOGY | Facility: CLINIC | Age: 60
End: 2020-08-18

## 2020-08-18 NOTE — TELEPHONE ENCOUNTER
The following [employer] message sent to the patient:    Hi Crispin,      I left you a voicemail but wanted to send you a [employer] message as well regarding your upcoming appointment scheduled in Endocrinology.  You are scheduled to see Jasmin Arteaga NP, next Wednesday, August 26th at 4:30 p.m.  Unfortunately we are not seeing patients in clinic at this time as we are trying to minimize patient exposure and keep our patients healthy while also practicing social distancing due to COVID19.  We would be happy to do your visit by video via [employer] or Smartphone as scheduled for 8/26/2020 at 4:30 p.m.      If you log in to your [employer], you will see that [employer] video visit instructions have been sent to you.  We would be happy to do a video visit via your smartphone if you prefer and I will plan to discuss how to connect virtually when I call you to prepare for the appointment.  I will call you up to 15 minutes prior to your scheduled appointment time to confirm you are ready for the appointment and answer any questions you may have about connecting online.  If this works for you, no need to call us back.  If not, please call us to discuss alternatives for your appointment.       Thank you,  Kristie Mares M.A.  [facility]  [phone] [facility]

## 2020-08-26 ENCOUNTER — VIRTUAL VISIT (OUTPATIENT)
Dept: ENDOCRINOLOGY | Facility: CLINIC | Age: 60
End: 2020-08-26
Payer: COMMERCIAL

## 2020-08-26 DIAGNOSIS — E11.42 TYPE 2 DIABETES MELLITUS WITH PERIPHERAL NEUROPATHY (H): Primary | ICD-10-CM

## 2020-08-26 DIAGNOSIS — E78.5 HYPERLIPIDEMIA LDL GOAL <100: ICD-10-CM

## 2020-08-26 PROCEDURE — 99213 OFFICE O/P EST LOW 20 MIN: CPT | Mod: 95 | Performed by: CLINICAL NURSE SPECIALIST

## 2020-08-26 NOTE — PROGRESS NOTES
"Crispin Rojas is a 60 year old male who is being evaluated via a billable video visi due to the COVID-19 pandemict.      The patient has been notified of following:     \"This video visit will be conducted via a call between you and your physician/provider. We have found that certain health care needs can be provided without the need for an in-person physical exam.  This service lets us provide the care you need with a video conversation.  If a prescription is necessary we can send it directly to your pharmacy.  If lab work is needed we can place an order for that and you can then stop by our lab to have the test done at a later time.    Video visits are billed at different rates depending on your insurance coverage.  Please reach out to your insurance provider with any questions.    If during the course of the call the physician/provider feels a video visit is not appropriate, you will not be charged for this service.\"    Patient has given verbal consent for Video visit? Yes  How would you like to obtain your AVS? MyChart  If you are dropped from the video visit, the video invite should be resent to: Text to cell phone: [phone]  Will anyone else be joining your video visit? No        Video-Visit Details    Type of service:  Video Visit    Video Start Time: 4:40 PM  Video End Time: 4:55 PM    Originating Location (pt. Location): Home    Distant Location (provider location):  [facility]     Platform used for Video Visit: [employer]    Name: Crispin Rojas  Seen today for Diabetes (Last seen 5/7/2020).  HPI:  Crispin Rojas is a 60 year old male who presents via video visit for the management of:    1. Type 2 DM:  Originally diagnosed at the age of: 1997.  Insulin treated 15+ years.    Current Regimen: Metformin 1000 mg bid, Ozempic 1 mg weekly , Tresiba 55 units q am,  Humalog 1:9 .    Has not been correcting his blood sugar unless it is >200.   Hasn't been carb counting lately, usually " takes 9 units before breakfast, no Humalog before lunch, 15-25 units before dinner.  Works 5:30 am to between 1:30 and 3:30.  Eats breakfast before work.  Doesn't eat again until after he is back home from work.    Started Bydureon 5/2018 - tolerated well.    Changed to Ozempic 6/2018 - feels Ozempic is working better  Using personal CGM - Corwin  Hospitalized 6/2019 for cellulitis and bilateral diabetic foot ulcers.  Healed now.    BS checks: Continuous via Corwin  Meter Download:    Corwin CGM download: average glucose 103.  12% below target range.    Previously discussed insulin pump therapy , not interested in pump therapy at this time.    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   He stopped am Humalog on work days and this helped decrease low blood sugars.    Tested + for COVID - has been symptom free the past 1-2 weeks.    Complications:   Diabetes Complications  Description / Detail    Diabetic Retinopathy   + diabetic retinopathy, last exam 6/2019, also sees  retinal specialist annually   CAD / PAD  No   Neuropathy   Yes, Left 1st and 2nd toe amptuated. partial amputations of the right 1st and 4th toes   Nephropathy / Microalbuminuria  Yes, elevated microalbumin   Gastroparesis  No   Hypoglycemia Unawareness  No       2. Hypertension: Blood Pressure:   BP Readings from Last 3 Encounters:   07/29/20 (!) 161/75   07/14/20 (!) 140/69   02/04/20 (!) 144/80   .  Blood pressure medications include Amlodipine 10 mg bid, lisinopril 40 mg qd.   3. Hyperlipidemia: Takes Atorvastatin 40 mg qd for lipid control.    4. Prevention:  Flu Shot- 10/2019  Pneumovax- 11/2015  Opthalmology-[facility]  Dental-recommend semiannually  ASA- 325 mg every day.  Smoking-   PMH/PSH:  Past Medical History:   Diagnosis Date     Cerebral infarction (H)      Chronic infection      H/O heartburn      History of heartburn      Hypertension      Numbness and tingling       Obesity      GILA (obstructive sleep apnea) 10/22/2018     Renal stone      Type II or unspecified type diabetes mellitus without mention of complication, not stated as uncontrolled      Past Surgical History:   Procedure Laterality Date     AMPUTATE FOOT Left 8/18/2016    Procedure: AMPUTATE FOOT;  Surgeon: Jack Younger DPM;  [facility]: [facility]     AMPUTATE TOE(S) Right 2/1/2016    Procedure: AMPUTATE TOE(S);  Surgeon: Rachelle Manriquez DPM, Pod;  Location: [facility]     AMPUTATE TOE(S) Right 8/2/2019    Procedure: Right fourth toe partial amputation for treatment of osteomyelitis.;  Surgeon: Jack Younger DPM;  Location: [facility]     AMPUTATE TOE(S) Left 11/8/2019    Procedure: Left second toe amputation at the metatarsophalangeal joint.;  Surgeon: Rachelle Manriquez DPM, Podiatry/Foot and Ankle Surgery;  Location: [facility]     ANGIOGRAM       COLONOSCOPY       COMBINED CYSTOSCOPY, RETROGRADES, URETEROSCOPY, INSERT STENT Left 8/21/2016    Procedure: COMBINED CYSTOSCOPY, RETROGRADES, URETEROSCOPY, INSERT STENT;  Surgeon: Artemio Valenzuela MD;  Location: [facility]     COMBINED CYSTOSCOPY, RETROGRADES, URETEROSCOPY, LASER HOLMIUM LITHOTRIPSY URETER(S), INSERT STENT Left 10/3/2016    Procedure: COMBINED CYSTOSCOPY, RETROGRADES, URETEROSCOPY, LASER HOLMIUM LITHOTRIPSY URETER(S), INSERT STENT;  Surgeon: Artemio Valenzuela MD;  [facility]     EXTRACORPOREAL SHOCK WAVE LITHOTRIPSY (ESWL) Left 9/8/2016    Procedure: EXTRACORPOREAL SHOCK WAVE LITHOTRIPSY (ESWL);  Surgeon: Artemio Valenzuela MD;  Location: [facility]     RECESSION GASTROCNEMIUS Right 2/1/2016    Procedure: RECESSION GASTROCNEMIUS;  Surgeon: Rachelle Manriquez DPM, Pod;  Location: [facility]     Family Hx:  Family History   Problem Relation Age of Onset     Arthritis Mother         SLE     Connective Tissue Disorder Mother         lupus     Diabetes Mother      Cerebrovascular Disease Mother      Cancer Mother      Diabetes Father      Diabetes Maternal  Grandfather      Diabetes Sister      Thyroid disease:          DM2: Yes: Strong family history on mothers side - mom, 7 maternal uncles, two maternal aunts, + MGF.         Autoimmune: DM1, SLE, RA, Vitiligo Yes: Lupus    Social Hx:  Social History     Socioeconomic History     Marital status:      Spouse name: Sydney     Number of children: 2     Years of education: Not on file     Highest education level: Master's degree (e.g., MA, MS, Arleth, MEd, MSW, ELIANA)   Occupational History     Occupation: marketing     Employer: [employer]     Comment: [employer]   Social Needs     Financial resource strain: Not very hard     Food insecurity     Worry: Never true     Inability: Never true     Transportation needs     Medical: No     Non-medical: No   Tobacco Use     Smoking status: Never Smoker     Smokeless tobacco: Never Used   Substance and Sexual Activity     Alcohol use: Yes     Alcohol/week: 0.0 standard drinks     Frequency: 2-4 times a month     Drinks per session: 1 or 2     Binge frequency: Never     Comment: rare---red wine 2x per month     Drug use: No     Sexual activity: Yes     Partners: Female   Lifestyle     Physical activity     Days per week: 0 days     Minutes per session: 0 min     Stress: Not on file   Relationships     Social connections     Talks on phone: Never     Gets together: Never     Attends [employer] service: More than 4 times per year     Active member of club or organization: No     Attends meetings of clubs or organizations: Never     Relationship status:      Intimate partner violence     Fear of current or ex partner: Not on file     Emotionally abused: Not on file     Physically abused: Not on file     Forced sexual activity: Not on file   Other Topics Concern     Parent/sibling w/ CABG, MI or angioplasty before 65F 55M? No   Social History Narrative     Not on file          MEDICATIONS:  has a current medication list which includes the following prescription(s):  amlodipine, aspirin, atorvastatin, blood glucose, blood glucose monitoring, blood glucose, cholecalciferol, co-enzyme q10, collagenase, freestyle lilly 14 day sensor, furosemide, gabapentin, insulin degludec, insulin lispro, pen needles, ketoconazole, lisinopril, magnesium, metformin, multiple vitamin, omega-3 fatty acids, potassium chloride er, ozempic (1 mg/dose), and tadalafil.    ROS     ROS: 10 point ROS neg other than the symptoms noted above in the HPI.    Physical Exam   VS: There were no vitals taken for this visit.  GENERAL: NAD, well dressed, answering questions appropriately, appears stated age.  HEENT: no exopthalmous, no proptosis, no lig lag, no retraction, no scleral icterus  RESPIRATORY: Normal respiratory effort.  CARDIOVASCULAR:   NEUROLOGY: CN grossly intact, no observable tremors  MSK: grossly intact - limited exam via video  PSYCH: Intact judgment and insight. A&OX3 with a cordial affect.    LABS:  A1c:   Component      Latest Ref Rng & Units 8/1/2019 10/21/2019 2/4/2020   Hemoglobin A1C      0 - 5.6 % 6.8 (H) 5.9 (H) 8.8 (H)     Basic Metabolic Panel:  !COMPREHENSIVE Latest Ref Rng & Units 2/4/2020   SODIUM 133 - 144 mmol/L 139   POTASSIUM 3.4 - 5.3 mmol/L 4.9   CHLORIDE 94 - 109 mmol/L 107   CO2 mmol/L    BUN 7 - 30 mg/dL 20   Creatinine 0.66 - 1.25 mg/dL    Creatinine 0.66 - 1.25 mg/dL 0.89   Glucose 70 - 99 mg/dL 252 (H)   ANION GAP 3 - 14 mmol/L 8   CALCIUM 8.5 - 10.1 mg/dL 9.0     LFTS/Cholesterol Panel:  !LIPID/HEPATIC Latest Ref Rng & Units 10/21/2019   CHOLESTEROL <200 mg/dL 109   TRIGLYCERIDES <150 mg/dL 78   HDL CHOLESTEROL >39 mg/dL 38 (L)   LDL CHOLESTEROL DIRECT 0 - 129 mg/dL    LDL CHOLESTEROL, CALCULATED <100 mg/dL 55   VLDL-CHOLESTEROL 0 - 30 mg/dL    NON HDL CHOLESTEROL <130 mg/dL 71     Thyroid Function:   !THYROID Latest Ref Rng & Units 12/27/2017   TSH 0.40 - 4.00 mU/L 1.78     Urine MicroAlbumin:  Component      Latest Ref Rng & Units 2/4/2020   Creatinine Urine      mg/dL  138   Albumin Urine mg/L      mg/L 145   Albumin Urine mg/g Cr      0 - 17 mg/g Cr 105.07 (H)     Vitamin D:    All pertinent notes, labs, and images personally reviewed by me.     A/P  Mr.Patrick AKASH Rojas is a 60 year old seen via video visit for the management of diabetes:    1. DM2 - Uncontrolled due to frequent hypoglycemia - most mild - in the mid to high 60's   Diabetes is complicated by diabetic retinopathy, nephropathy, and neuropathy with history of left 1st and 2nd toe amptuated. partial amputations of the right 1st and 4th toes  Monitoring blood sugars continuously via Corwin  His diabetes is currently treated with 3-4 insulin injections per day.  He makes frequent insulin dose adjustments based on his glucose readings.  Recommend he continue monitoring his blood sugar continuously.  Plan to change to Corwin 2 as he could benefit from the high and low glucose alarms.    Decrease Tresiba to 48 units every day.  Continue Metformin 1000 mg bid,  Continue current dose of Humalog and Ozempic 1 mg daily.  Recommend a Humalog correction of 1:25>150.   Recommend he enter Humalog insulin doses into the Corwin.  Obtain a1c and BMP within the next 1-2 weeks.      2. Hyperlipidemia - On statin therapy    Labs ordered today:   No orders of the defined types were placed in this encounter.      Radiology/Consults ordered today: None       Follow-up:  January 2021.    Jasmin Arteaga NP  Endocrinology  [facility]  CC:

## 2020-08-26 NOTE — LETTER
"    8/26/2020         RE: Crispin Rojas  [address] [place] [address]        Dear Colleague,    Thank you for referring your patient, Crispin Rojas, to the [facility]. Please see a copy of my visit note below.    Crispin Rojas is a 60 year old male who is being evaluated via a billable video visi due to the COVID-19 pandemict.      The patient has been notified of following:     \"This video visit will be conducted via a call between you and your physician/provider. We have found that certain health care needs can be provided without the need for an in-person physical exam.  This service lets us provide the care you need with a video conversation.  If a prescription is necessary we can send it directly to your pharmacy.  If lab work is needed we can place an order for that and you can then stop by our lab to have the test done at a later time.    Video visits are billed at different rates depending on your insurance coverage.  Please reach out to your insurance provider with any questions.    If during the course of the call the physician/provider feels a video visit is not appropriate, you will not be charged for this service.\"    Patient has given verbal consent for Video visit? Yes  How would you like to obtain your AVS? MyChart  If you are dropped from the video visit, the video invite should be resent to: Text to cell phone: [phone]  Will anyone else be joining your video visit? No        Video-Visit Details    Type of service:  Video Visit    Video Start Time: 4:40 PM  Video End Time: 4:55 PM    Originating Location (pt. Location): Home    Distant Location (provider location):  [facility]     Platform used for Video Visit: [employer]    Name: Crispin Rojas  Seen today for Diabetes (Last seen 5/7/2020).  HPI:  Crispin Rojas is a 60 year old male who presents via video visit for the management of:    1. Type 2 DM:  Originally diagnosed at " the age of: 1997.  Insulin treated 15+ years.    Current Regimen: Metformin 1000 mg bid, Ozempic 1 mg weekly , Tresiba 55 units q am,  Humalog 1:9 .    Has not been correcting his blood sugar unless it is >200.   Hasn't been carb counting lately, usually takes 9 units before breakfast, no Humalog before lunch, 15-25 units before dinner.  Works 5:30 am to between 1:30 and 3:30.  Eats breakfast before work.  Doesn't eat again until after he is back home from work.    Started Bydureon 5/2018 - tolerated well.    Changed to Ozempic 6/2018 - feels Ozempic is working better  Using personal CGM - Corwin  Hospitalized 6/2019 for cellulitis and bilateral diabetic foot ulcers.  Healed now.    BS checks: Continuous via Corwin  Meter Download:    Corwin CGM download: average glucose 103.  12% below target range.    Previously discussed insulin pump therapy , not interested in pump therapy at this time.    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   He stopped am Humalog on work days and this helped decrease low blood sugars.    Tested + for COVID - has been symptom free the past 1-2 weeks.    Complications:   Diabetes Complications  Description / Detail    Diabetic Retinopathy   + diabetic retinopathy, last exam 6/2019, also sees  retinal specialist annually   CAD / PAD  No   Neuropathy   Yes, Left 1st and 2nd toe amptuated. partial amputations of the right 1st and 4th toes   Nephropathy / Microalbuminuria  Yes, elevated microalbumin   Gastroparesis  No   Hypoglycemia Unawareness  No       2. Hypertension: Blood Pressure:   BP Readings from Last 3 Encounters:   07/29/20 (!) 161/75   07/14/20 (!) 140/69   02/04/20 (!) 144/80   .  Blood pressure medications include Amlodipine 10 mg bid, lisinopril 40 mg qd.   3. Hyperlipidemia: Takes Atorvastatin 40 mg qd for lipid control.    4. Prevention:  Flu Shot- 10/2019  [facility]neumovax-  11/2015  [facility]  Dental-recommend semiannually  ASA- 325 mg every day.  Smoking-   PMH/PSH:  Past Medical History:   Diagnosis Date     Cerebral infarction (H)      Chronic infection      H/O heartburn      History of heartburn      Hypertension      Numbness and tingling      Obesity      GILA (obstructive sleep apnea) 10/22/2018     Renal stone      Type II or unspecified type diabetes mellitus without mention of complication, not stated as uncontrolled      Past Surgical History:   Procedure Laterality Date     AMPUTATE FOOT Left 8/18/2016    Procedure: AMPUTATE FOOT;  Surgeon: Jack Younger DPM;  [facility]     AMPUTATE TOE(S) Right 2/1/2016    Procedure: AMPUTATE TOE(S);  Surgeon: Rachelle Manriquez DPM, Pod;  [facility]: [facility]     AMPUTATE TOE(S) Right 8/2/2019    Procedure: Right fourth toe partial amputation for treatment of osteomyelitis.;  Surgeon: Jack Younger DPM;  [facility]: [facility]     AMPUTATE TOE(S) Left 11/8/2019    Procedure: Left second toe amputation at the metatarsophalangeal joint.;  Surgeon: Rachelle Manriquez DPM, Podiatry/Foot and Ankle Surgery;  Location: [facility]     ANGIOGRAM       COLONOSCOPY       COMBINED CYSTOSCOPY, RETROGRADES, URETEROSCOPY, INSERT STENT Left 8/21/2016    Procedure: COMBINED CYSTOSCOPY, RETROGRADES, URETEROSCOPY, INSERT STENT;  Surgeon: Artemio Valenzuela MD;  Location: [facility]     COMBINED CYSTOSCOPY, RETROGRADES, URETEROSCOPY, LASER HOLMIUM LITHOTRIPSY URETER(S), INSERT STENT Left 10/3/2016    Procedure: COMBINED CYSTOSCOPY, RETROGRADES, URETEROSCOPY, LASER HOLMIUM LITHOTRIPSY URETER(S), INSERT STENT;  Surgeon: Artemio Valenzuela MD;  [facility]     EXTRACORPOREAL SHOCK WAVE LITHOTRIPSY (ESWL) Left 9/8/2016    Procedure: EXTRACORPOREAL SHOCK WAVE LITHOTRIPSY (ESWL);  Surgeon: Artemio Valenzuela MD;  Location: [facility]     RECESSION GASTROCNEMIUS Right 2/1/2016    Procedure: RECESSION GASTROCNEMIUS;  Surgeon: Rachelle Manriquez DPM,  Pod;  Location: [facility]     Family Hx:  Family History   Problem Relation Age of Onset     Arthritis Mother         SLE     Connective Tissue Disorder Mother         lupus     Diabetes Mother      Cerebrovascular Disease Mother      Cancer Mother      Diabetes Father      Diabetes Maternal Grandfather      Diabetes Sister      Thyroid disease:          DM2: Yes: Strong family history on mothers side - mom, 7 maternal uncles, two maternal aunts, + MGF.         Autoimmune: DM1, SLE, RA, Vitiligo Yes: Lupus    Social Hx:  Social History     Socioeconomic History     Marital status:      Spouse name: Sydney     Number of children: 2     Years of education: Not on file     Highest education level: Master's degree (e.g., MA, MS, Arleth, MEd, MSW, ELIANA)   Occupational History     Occupation: marketing     Employer: [employer]     Comment: [employer]   Social Needs     Financial resource strain: Not very hard     Food insecurity     Worry: Never true     Inability: Never true     Transportation needs     Medical: No     Non-medical: No   Tobacco Use     Smoking status: Never Smoker     Smokeless tobacco: Never Used   Substance and Sexual Activity     Alcohol use: Yes     Alcohol/week: 0.0 standard drinks     Frequency: 2-4 times a month     Drinks per session: 1 or 2     Binge frequency: Never     Comment: rare---red wine 2x per month     Drug use: No     Sexual activity: Yes     Partners: Female   Lifestyle     Physical activity     Days per week: 0 days     Minutes per session: 0 min     Stress: Not on file   Relationships     Social connections     Talks on phone: Never     Gets together: Never     Attends Bahai service: More than 4 times per year     Active member of club or organization: No     Attends meetings of clubs or organizations: Never     Relationship status:      Intimate partner violence     Fear of current or ex partner: Not on file     Emotionally abused: Not on file     Physically abused: Not  on file     Forced sexual activity: Not on file   Other Topics Concern     Parent/sibling w/ CABG, MI or angioplasty before 65F 55M? No   Social History Narrative     Not on file          MEDICATIONS:  has a current medication list which includes the following prescription(s): amlodipine, aspirin, atorvastatin, blood glucose, blood glucose monitoring, blood glucose, cholecalciferol, co-enzyme q10, collagenase, freestyle lilly 14 day sensor, furosemide, gabapentin, insulin degludec, insulin lispro, pen needles, ketoconazole, lisinopril, magnesium, metformin, multiple vitamin, omega-3 fatty acids, potassium chloride er, ozempic (1 mg/dose), and tadalafil.    ROS     ROS: 10 point ROS neg other than the symptoms noted above in the HPI.    Physical Exam   VS: There were no vitals taken for this visit.  GENERAL: NAD, well dressed, answering questions appropriately, appears stated age.  HEENT: no exopthalmous, no proptosis, no lig lag, no retraction, no scleral icterus  RESPIRATORY: Normal respiratory effort.  CARDIOVASCULAR:   NEUROLOGY: CN grossly intact, no observable tremors  MSK: grossly intact - limited exam via video  PSYCH: Intact judgment and insight. A&OX3 with a cordial affect.    LABS:  A1c:   Component      Latest Ref Rng & Units 8/1/2019 10/21/2019 2/4/2020   Hemoglobin A1C      0 - 5.6 % 6.8 (H) 5.9 (H) 8.8 (H)     Basic Metabolic Panel:  !COMPREHENSIVE Latest Ref Rng & Units 2/4/2020   SODIUM 133 - 144 mmol/L 139   POTASSIUM 3.4 - 5.3 mmol/L 4.9   CHLORIDE 94 - 109 mmol/L 107   CO2 mmol/L    BUN 7 - 30 mg/dL 20   Creatinine 0.66 - 1.25 mg/dL    Creatinine 0.66 - 1.25 mg/dL 0.89   Glucose 70 - 99 mg/dL 252 (H)   ANION GAP 3 - 14 mmol/L 8   CALCIUM 8.5 - 10.1 mg/dL 9.0     LFTS/Cholesterol Panel:  !LIPID/HEPATIC Latest Ref Rng & Units 10/21/2019   CHOLESTEROL <200 mg/dL 109   TRIGLYCERIDES <150 mg/dL 78   HDL CHOLESTEROL >39 mg/dL 38 (L)   LDL CHOLESTEROL DIRECT 0 - 129 mg/dL    LDL CHOLESTEROL, CALCULATED  <100 mg/dL 55   VLDL-CHOLESTEROL 0 - 30 mg/dL    NON HDL CHOLESTEROL <130 mg/dL 71     Thyroid Function:   !THYROID Latest Ref Rng & Units 12/27/2017   TSH 0.40 - 4.00 mU/L 1.78     Urine MicroAlbumin:  Component      Latest Ref Rng & Units 2/4/2020   Creatinine Urine      mg/dL 138   Albumin Urine mg/L      mg/L 145   Albumin Urine mg/g Cr      0 - 17 mg/g Cr 105.07 (H)     Vitamin D:    All pertinent notes, labs, and images personally reviewed by me.     A/P  Mr.Patrick AKASH Rojas is a 60 year old seen via video visit for the management of diabetes:    1. DM2 - Uncontrolled due to frequent hypoglycemia - most mild - in the mid to high 60's   Diabetes is complicated by diabetic retinopathy, nephropathy, and neuropathy with history of left 1st and 2nd toe amptuated. partial amputations of the right 1st and 4th toes  Monitoring blood sugars continuously via Corwin  His diabetes is currently treated with 3-4 insulin injections per day.  He makes frequent insulin dose adjustments based on his glucose readings.  Recommend he continue monitoring his blood sugar continuously.  Plan to change to Corwin 2 as he could benefit from the high and low glucose alarms.    Decrease Tresiba to 48 units every day.  Continue Metformin 1000 mg bid,  Continue current dose of Humalog and Ozempic 1 mg daily.  Recommend a Humalog correction of 1:25>150.   Recommend he enter Humalog insulin doses into the Corwin.  Obtain a1c and BMP within the next 1-2 weeks.      2. Hyperlipidemia - On statin therapy    Labs ordered today:   No orders of the defined types were placed in this encounter.      Radiology/Consults ordered today: None       Follow-up:  January 2021.    Jasmin Arteaga NP  Endocrinology  [facility]  CC:     Again, thank you for allowing me to participate in the care of your patient.        Sincerely,        SARAH Raymundo CNP

## 2020-09-09 DIAGNOSIS — E11.42 TYPE 2 DIABETES MELLITUS WITH PERIPHERAL NEUROPATHY (H): ICD-10-CM

## 2020-09-09 LAB — HBA1C MFR BLD: 6 % (ref 0–5.6)

## 2020-09-09 PROCEDURE — 83036 HEMOGLOBIN GLYCOSYLATED A1C: CPT | Performed by: CLINICAL NURSE SPECIALIST

## 2020-09-09 PROCEDURE — 36415 COLL VENOUS BLD VENIPUNCTURE: CPT | Performed by: CLINICAL NURSE SPECIALIST

## 2020-09-09 PROCEDURE — 80048 BASIC METABOLIC PNL TOTAL CA: CPT | Performed by: CLINICAL NURSE SPECIALIST

## 2020-09-10 LAB
ANION GAP SERPL CALCULATED.3IONS-SCNC: 7 MMOL/L (ref 3–14)
BUN SERPL-MCNC: 20 MG/DL (ref 7–30)
CALCIUM SERPL-MCNC: 9.3 MG/DL (ref 8.5–10.1)
CHLORIDE SERPL-SCNC: 109 MMOL/L (ref 94–109)
CO2 SERPL-SCNC: 24 MMOL/L (ref 20–32)
CREAT SERPL-MCNC: 0.88 MG/DL (ref 0.66–1.25)
GFR SERPL CREATININE-BSD FRML MDRD: >90 ML/MIN/{1.73_M2}
GLUCOSE SERPL-MCNC: 90 MG/DL (ref 70–99)
POTASSIUM SERPL-SCNC: 3.9 MMOL/L (ref 3.4–5.3)
SODIUM SERPL-SCNC: 140 MMOL/L (ref 133–144)

## 2020-09-10 NOTE — RESULT ENCOUNTER NOTE
Crispin,  Your A1c has improved to 6.0%!  Congratulations.  Keep up the good work!  Your kidney test is in normal range.  Here's a copy of the results for your records.  Jasmin Arteaga NP  Endocrinology

## 2020-09-23 NOTE — DISCHARGE INSTRUCTIONS
Follow up with your PMD in 2-3 days in HA continues.  Please return to ED for increased or new concerning symptoms including weakness, repetitive vomiting, increased HA, visual changes.  Take 600mg Ibuprofen every 6 hours with food as needed for HA.  You may also try some caffeine for the headache.      Discharge Instructions  Headache    You were seen today for a headache. Headaches may be caused by many different things such as muscle tension, sinus inflammation, anxiety and stress, having too little sleep, too much alcohol, some medical conditions or injury. You may have a migraine, which is caused by changes in the blood vessels in your head.  At this time your provider does not find that your headache is a sign of anything dangerous or life-threatening.  However, sometimes the signs of serious illness do not show up right away.      Generally, every Emergency Department visit should have a follow-up clinic visit with either a primary or a specialty clinic/provider. Please follow-up as instructed by your emergency provider today.    Return to the Emergency Department if:  You get a new fever of 100.4 F or higher.  Your headache gets much worse.  You get a stiff neck with your headache.  You get a new headache that is significantly different or worse than headaches you have had before.  You are vomiting (throwing up) and cannot keep food or water down.  You have blurry or double vision or other problems with your eyes.  You have a new weakness on one side of your body.  You have difficulty with balance which is new.  You or your family thinks you are confused.  You have a seizure.    What can I do to help myself?  Pain medications - You may take a pain medication such as Tylenol  (acetaminophen), Advil , Motrin  (ibuprofen) or Aleve  (naproxen).  Take a pain reliever as soon as you notice symptoms.  Starting medications as soon as you start to have symptoms may lessen the amount of pain you have.  Relaxing in a  What Is The Reason For Today's Visit?: Full Body Skin Examination with No Concerns What Is The Reason For Today's Visit? (Being Monitored For X): concerning skin lesions on an annual basis quiet, dark room may help.  Get enough sleep and eat meals regularly.  You may need to watch for certain foods or other things which may trigger your headaches.  Keeping a journal of your headaches and possible triggers may help you and your primary provider to identify things which you should avoid which may be causing your headaches.  If you were given a prescription for medicine here today, be sure to read all of the information (including the package insert) that comes with your prescription.  This will include important information about the medicine, its side effects, and any warnings that you need to know about.  The pharmacist who fills the prescription can provide more information and answer questions you may have about the medicine.  If you have questions or concerns that the pharmacist cannot address, please call or return to the Emergency Department.   Remember that you can always come back to the Emergency Department if you are not able to see your regular provider in the amount of time listed above, if you get any new symptoms, or if there is anything that worries you.

## 2020-10-27 ENCOUNTER — NURSE TRIAGE (OUTPATIENT)
Dept: NURSING | Facility: CLINIC | Age: 60
End: 2020-10-27

## 2020-10-27 NOTE — TELEPHONE ENCOUNTER
Patient states 's license has not been revoked, but if form is not filled out verifying he is medically stable to drive by next Friday, license will be cancelled. He states Dr. Serna filled out the form during 7/29/20 visit, but DMV did not receive and he no longer has copy. Patient states he is very stable with DM management, A1C's have been stable, trending better. Please advise, should Dr. Serna complete, or Dr. Lopez? Route back for patient update.     Hemoglobin A1C   Date Value Ref Range Status   09/09/2020 6.0 (H) 0 - 5.6 % Final     Comment:     Normal <5.7% Prediabetes 5.7-6.4%  Diabetes 6.5% or higher - adopted from ADA   consensus guidelines.     05/12/2020 7.8 (H) 0 - 5.6 % Final     Comment:     Normal <5.7% Prediabetes 5.7-6.4%  Diabetes 6.5% or higher - adopted from ADA   consensus guidelines.  Reviewed: OK with previous     02/04/2020 8.8 (H) 0 - 5.6 % Final     Comment:     Results confirmed by repeat test  Normal <5.7% Prediabetes 5.7-6.4%  Diabetes 6.5% or higher - adopted from ADA   consensus guidelines.       Ivette Steven, JOSEN, RN, PHN

## 2020-10-27 NOTE — TELEPHONE ENCOUNTER
Clinic Action Needed: Needs a form filled out by Dr Lopez  [facility] Triage Call  Presenting Problem:  Patient is calling because he received a notice of cancellation of driving privileges. He needs a form filled out by his PCP- Dr Lopez    What is the procedure for getting a form to his PCP? Patient is wondering if he can drop the form off at the clinic? Please call patient at [phone]      Routed to: [facility] RN Nils Cast RN/[facility]          Reason for Disposition    [1] Caller requesting NON-URGENT health information AND [2] PCP's office is the best resource    Additional Information    Negative: [1] Caller is not with the adult (patient) AND [2] reporting urgent symptoms    Negative: Lab result questions    Negative: Medication questions    Negative: Caller can't be reached by phone    Negative: Caller has already spoken to PCP or another triager    Negative: RN needs further essential information from caller in order to complete triage    Negative: Requesting regular office appointment    Protocols used: INFORMATION ONLY CALL-A-

## 2020-10-28 NOTE — TELEPHONE ENCOUNTER
Patient notified that JM is agreeable to filling out form. He agrees to drop off form at  for Dr. Lopez to fill out, has no further questions.   Ivette Steven, JOSEN, RN, PHN

## 2020-10-29 ENCOUNTER — TELEPHONE (OUTPATIENT)
Dept: FAMILY MEDICINE | Facility: CLINIC | Age: 60
End: 2020-10-29

## 2020-10-29 NOTE — TELEPHONE ENCOUNTER
Forms/Letter Request    Name of form/letter: DMV Insulin Form    Have you been seen for this request: Yes     Do we have the form/letter: Yes: At the [facility]    When is form/letter needed by: sooner than later    How would you like the form/letter returned: Mail    Patient Notified form requests are processed in 3-5 business days:Yes    Okay to leave a detailed message? Yes Home number on file [phone] (home)

## 2020-10-30 NOTE — TELEPHONE ENCOUNTER
Call to patient, advised form was completed by Dr. Lopez and we would send to DMV. This RN made a copy, and will keep for 2 weeks, as last form was never received by DMV, per patient report.   Ivette Steven, JOSEN, RN, PHN

## 2020-10-30 NOTE — TELEPHONE ENCOUNTER
Form started and needs to be completed by Dr. ADAMS, form placed in Dr. ADAMS in-basket    Shania Warren/[facility]---[facility]

## 2020-12-08 ENCOUNTER — OFFICE VISIT (OUTPATIENT)
Dept: CARDIOLOGY | Facility: CLINIC | Age: 60
End: 2020-12-08
Payer: COMMERCIAL

## 2020-12-08 VITALS
WEIGHT: 253.8 LBS | OXYGEN SATURATION: 97 % | SYSTOLIC BLOOD PRESSURE: 143 MMHG | HEIGHT: 72 IN | HEART RATE: 62 BPM | BODY MASS INDEX: 34.38 KG/M2 | DIASTOLIC BLOOD PRESSURE: 75 MMHG

## 2020-12-08 DIAGNOSIS — E78.5 HYPERLIPIDEMIA LDL GOAL <70: ICD-10-CM

## 2020-12-08 DIAGNOSIS — G47.33 OSA (OBSTRUCTIVE SLEEP APNEA): ICD-10-CM

## 2020-12-08 DIAGNOSIS — Z79.4 TYPE 2 DIABETES MELLITUS WITH DIABETIC POLYNEUROPATHY, WITH LONG-TERM CURRENT USE OF INSULIN (H): ICD-10-CM

## 2020-12-08 DIAGNOSIS — Z78.9 INTOLERANCE OF CONTINUOUS POSITIVE AIRWAY PRESSURE (CPAP) VENTILATION: ICD-10-CM

## 2020-12-08 DIAGNOSIS — I45.10 RIGHT BUNDLE BRANCH BLOCK: ICD-10-CM

## 2020-12-08 DIAGNOSIS — I10 BENIGN ESSENTIAL HYPERTENSION: Primary | ICD-10-CM

## 2020-12-08 DIAGNOSIS — E11.42 TYPE 2 DIABETES MELLITUS WITH DIABETIC POLYNEUROPATHY, WITH LONG-TERM CURRENT USE OF INSULIN (H): ICD-10-CM

## 2020-12-08 DIAGNOSIS — I77.810 MILD ASCENDING AORTA DILATATION (H): ICD-10-CM

## 2020-12-08 DIAGNOSIS — Z86.73 HISTORY OF STROKE: ICD-10-CM

## 2020-12-08 PROBLEM — M86.9 OSTEOMYELITIS (H): Status: RESOLVED | Noted: 2019-08-01 | Resolved: 2020-12-08

## 2020-12-08 PROBLEM — Z98.890 POST-OPERATIVE STATE: Status: RESOLVED | Noted: 2019-11-08 | Resolved: 2020-12-08

## 2020-12-08 PROBLEM — L03.90 CELLULITIS: Status: RESOLVED | Noted: 2019-06-24 | Resolved: 2020-12-08

## 2020-12-08 PROBLEM — K81.9 CHOLECYSTITIS: Status: RESOLVED | Noted: 2019-07-07 | Resolved: 2020-12-08

## 2020-12-08 PROCEDURE — 93000 ELECTROCARDIOGRAM COMPLETE: CPT | Performed by: INTERNAL MEDICINE

## 2020-12-08 PROCEDURE — 99215 OFFICE O/P EST HI 40 MIN: CPT | Mod: 25 | Performed by: INTERNAL MEDICINE

## 2020-12-08 RX ORDER — ASPIRIN 81 MG/1
81 TABLET, CHEWABLE ORAL DAILY
COMMUNITY
Start: 2020-12-08 | End: 2021-01-13

## 2020-12-08 RX ORDER — HYDROCHLOROTHIAZIDE 12.5 MG/1
12.5 CAPSULE ORAL EVERY MORNING
Qty: 30 CAPSULE | Refills: 3 | Status: SHIPPED | OUTPATIENT
Start: 2020-12-08 | End: 2021-06-08

## 2020-12-08 ASSESSMENT — MIFFLIN-ST. JEOR: SCORE: 2001.29

## 2020-12-08 NOTE — LETTER
12/8/2020    Aden Lopez MD  [address] [facility] [address]    RE: Crispin Rojas       Dear Colleague,    I had the pleasure of seeing Crispin Rojas in the [facility].    REVIEW OF SYSTEMS:  A comprehensive 10-point review of systems was completed and the pertinent positives are documented in the history of present illness.    Skin:  Negative     Eyes:  Positive for glasses  ENT:  Negative    Respiratory:  Negative    Cardiovascular:  Negative    Gastroenterology: Positive for heartburn  Genitourinary:  Negative    Musculoskeletal:  Positive for neck pain  Neurologic:  Negative    Psychiatric:  Negative    Heme/Lymph/Imm:  Negative    Endocrine:  Positive for diabetes    CURRENT MEDICATIONS:  Current Outpatient Medications   Medication Sig Dispense Refill     amLODIPine (NORVASC) 5 MG tablet Take 1 tablet (5 mg) by mouth 2 times daily 180 tablet 1     aspirin (ASA) 81 MG chewable tablet Take 1 tablet (81 mg) by mouth daily       atorvastatin (LIPITOR) 40 MG tablet TAKE 1 TABLET BY MOUTH EVERY DAY 90 tablet 2     blood glucose (NO BRAND SPECIFIED) lancets standard Use to test blood sugar 3 times daily or as directed. 300 each 3     blood glucose monitoring (NO BRAND SPECIFIED) meter device kit Use to test blood sugar 3 times daily or as directed. 1 kit 0     blood glucose monitoring (NO BRAND SPECIFIED) test strip Use to test blood sugars 3 times daily or as directed 300 strip 3     Cholecalciferol (VITAMIN D3 PO) Take 1,000 Units by mouth daily        Co-Enzyme Q10 100 MG CAPS Take 100 mg by mouth daily        Continuous Blood Gluc Sensor (FREESTYLE LUCERO 14 DAY SENSOR) MISC 1 each every 14 days 2 each 11     Continuous Blood Gluc Sensor (FREESTYLE LUCERO 2 SENSOR SYSTM) MISC 1 each every 14 days 6 each 3     gabapentin (NEURONTIN) 100 MG capsule Take 300 mg by mouth 3 times daily       hydrochlorothiazide (MICROZIDE) 12.5 MG capsule Take 1 capsule (12.5 mg) by  mouth every morning 30 capsule 3     insulin degludec (TRESIBA FLEXTOUCH) 100 UNIT/ML pen Inject 55 Units Subcutaneous daily 15 mL 11     insulin lispro (HUMALOG KWIKPEN) 100 UNIT/ML (1 unit dial) KWIKPEN 1 units per 9 grams of carbohydrate before each meal + 1:16 over 150 correction.  TDD 70 units 45 mL 3     Insulin Pen Needle (PEN NEEDLES) 31G X 5 MM MISC 1 Device 4 times daily 200 each 10     ketoconazole (NIZORAL) 2 % external shampoo Apply topically daily as needed , apply daily and wash off after 5 min       lisinopril (ZESTRIL) 40 MG tablet Take 1 tablet (40 mg) by mouth daily 90 tablet 3     MAGNESIUM GLYCINATE PLUS PO Take 400 mg by mouth 2 times daily       metFORMIN (GLUCOPHAGE-XR) 500 MG 24 hr tablet TAKE 2 TABLETS BY MOUTH TWICE DAILY WITH MEALS 360 tablet 3     Multiple Vitamins-Minerals (MULTIVITAMIN PO) Take 1 tablet by mouth daily        Omega-3 Fatty Acids (OMEGA-3 FISH OIL PO) Take 1 g by mouth 2 times daily (with meals)        semaglutide (OZEMPIC, 1 MG/DOSE,) 2 MG/1.5ML pen Inject 1 mg Subcutaneous every 7 days , on Wednesdays 2 pen 11     tadalafil (CIALIS) 5 MG tablet TAKE 1 TABLET BY MOUTH EVERY DAY AS NEEDED 30 tablet 1     collagenase (SANTYL) 250 UNIT/GM external ointment Apply topically daily Apply to ulcer daily (Patient not taking: Reported on 8/10/2020) 30 g 1     furosemide (LASIX) 20 MG tablet Take 1 tablet (20 mg) by mouth daily (Patient not taking: Reported on 8/10/2020) 5 tablet 3     potassium chloride ER (KLOR-CON M) 10 MEQ CR tablet Take 1 tablet (10 mEq) by mouth 2 times daily (Patient not taking: Reported on 8/10/2020) 5 tablet 3         ALLERGIES:  Allergies   Allergen Reactions     Niacin Swelling     SIMCOR caused edema in extremities     Simvastatin Swelling     SIMCOR caused edema in extremities       PAST MEDICAL HISTORY:    Past Medical History:   Diagnosis Date     Cerebral infarction (H)      Chronic infection      History of heartburn      Hyperlipidemia LDL goal  <70      Hypertension      Numbness and tingling      Obesity      GILA (obstructive sleep apnea) 10/22/2018     Renal stone      Type II or unspecified type diabetes mellitus without mention of complication, not stated as uncontrolled        PAST SURGICAL HISTORY:    Past Surgical History:   Procedure Laterality Date     AMPUTATE FOOT Left 8/18/2016    Procedure: AMPUTATE FOOT;  Surgeon: Jack Younger DPM;  Location: [facility] OR     AMPUTATE TOE(S) Right 2/1/2016    Procedure: AMPUTATE TOE(S);  Surgeon: Rachelle Manriquez DPM, Pod;  Location: [facility] OR     AMPUTATE TOE(S) Right 8/2/2019    Procedure: Right fourth toe partial amputation for treatment of osteomyelitis.;  Surgeon: Jack Younger DPM;  Location: [facility] OR     AMPUTATE TOE(S) Left 11/8/2019    Procedure: Left second toe amputation at the metatarsophalangeal joint.;  Surgeon: Rachelle Manriquez DPM, Podiatry/Foot and Ankle Surgery;  Location: [facility] OR     ANGIOGRAM       COLONOSCOPY       COMBINED CYSTOSCOPY, RETROGRADES, URETEROSCOPY, INSERT STENT Left 8/21/2016    Procedure: COMBINED CYSTOSCOPY, RETROGRADES, URETEROSCOPY, INSERT STENT;  Surgeon: Artemio Valenzuela MD;  Location: [facility] OR     COMBINED CYSTOSCOPY, RETROGRADES, URETEROSCOPY, LASER HOLMIUM LITHOTRIPSY URETER(S), INSERT STENT Left 10/3/2016    Procedure: COMBINED CYSTOSCOPY, RETROGRADES, URETEROSCOPY, LASER HOLMIUM LITHOTRIPSY URETER(S), INSERT STENT;  Surgeon: Artemio Valenzuela MD;  Location: [facility] OR     EXTRACORPOREAL SHOCK WAVE LITHOTRIPSY (ESWL) Left 9/8/2016    Procedure: EXTRACORPOREAL SHOCK WAVE LITHOTRIPSY (ESWL);  Surgeon: Artemio Valenzuela MD;  Location: [facility] OR     RECESSION GASTROCNEMIUS Right 2/1/2016    Procedure: RECESSION GASTROCNEMIUS;  Surgeon: Rachelle Manriquez DPM, Pod;  Location: [facility] OR       FAMILY HISTORY:    Family History   Problem Relation Age of Onset     Arthritis Mother         SLE     Connective Tissue Disorder Mother         lupus     Diabetes Mother       "Cerebrovascular Disease Mother      Cancer Mother      Diabetes Father      Diabetes Maternal Grandfather      Diabetes Sister        SOCIAL HISTORY:    Social History     Socioeconomic History     Marital status:      Spouse name: Sydney     Number of children: 2     Years of education: None     Highest education level: Master's degree (e.g., MA, MS, Arleth, MEd, MSW, ELIANA)   Occupational History     Occupation: marketing     Employer: [employer]     Comment: [employer]   Social Needs     Financial resource strain: Not very hard     Food insecurity     Worry: Never true     Inability: Never true     Transportation needs     Medical: No     Non-medical: No   Tobacco Use     Smoking status: Never Smoker     Smokeless tobacco: Never Used   Substance and Sexual Activity     Alcohol use: Yes     Alcohol/week: 0.0 standard drinks     Frequency: 2-4 times a month     Drinks per session: 1 or 2     Binge frequency: Never     Comment: rare---red wine 2x per month     Drug use: No     Sexual activity: Yes     Partners: Female   Lifestyle     Physical activity     Days per week: 0 days     Minutes per session: 0 min     Stress: None   Relationships     Social connections     Talks on phone: Never     Gets together: Never     Attends Rastafarian service: More than 4 times per year     Active member of club or organization: No     Attends meetings of clubs or organizations: Never     Relationship status:      Intimate partner violence     Fear of current or ex partner: None     Emotionally abused: None     Physically abused: None     Forced sexual activity: None   Other Topics Concern     Parent/sibling w/ CABG, MI or angioplasty before 65F 55M? No   Social History Narrative     None       PHYSICAL EXAM:    Vitals: BP (!) 143/75 (BP Location: Right arm, Patient Position: Sitting, Cuff Size: Adult Regular)   Pulse 62   Ht 1.816 m (5' 11.5\")   Wt 115.1 kg (253 lb 12.8 oz)   SpO2 97%   BMI 34.90 kg/m    Wt Readings " from Last 5 Encounters:   12/08/20 115.1 kg (253 lb 12.8 oz)   07/29/20 108.5 kg (239 lb 1.6 oz)   07/14/20 106.6 kg (235 lb)   02/20/20 109.8 kg (242 lb)   02/04/20 112 kg (247 lb)     Encounter Diagnoses   Name Primary?     Benign essential hypertension Yes     Right bundle branch block      Type 2 diabetes mellitus with diabetic polyneuropathy, with long-term current use of insulin (H)      History of stroke      Hyperlipidemia LDL goal <70      GILA (obstructive sleep apnea)      Intolerance of continuous positive airway pressure (CPAP) ventilation      Mild ascending aorta dilatation (H)      BMI 35.0-35.9,adult        Orders Placed This Encounter   Procedures     Basic metabolic panel     Lipid Profile     Follow-Up with Cardiac Advanced Practice Provider     Follow-Up with Cardiologist     EKG 12-lead complete w/read - Clinics (performed today)     Echocardiogram Complete       Service Date: 12/08/2020      IN-CLINIC VISIT      PRIMARY CARE AND REFERRING PROVIDER:  Aden Lopez MD      PREVIOUS CARDIOLOGIST:  Cr Cassidy MD      REASON FOR VISIT:  Scheduled followup of complex patient with multiple cardiovascular comorbidities.        HISTORY OF PRESENT ILLNESS:  It was my pleasure to meet with Mr. Crispin Rojas.  I am meeting him for the first time.  He was previously followed by my late partner, Dr. Cr Cassidy.  Crispin is , lives with his wife, has 4 different jobs (he says he works because he enjoys it, not because he needs the money).  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      Crispin has multiple cardiovascular issues.  These include:   1.  History of cerebellar stroke in 2010.  MRI showed stenosis in small branch of the distal left vertebral artery.  Treated medically.  No residual sequelae.   2.  Longstanding type 2 diabetes mellitus of 22 years.  On insulin.  A1c  "6%.   3.  Peripheral vascular disease secondary to #2.  Status post left partial toe amputation in the past.   4.  Benign essential hypertension.  Not at goal.   5.  Hyperlipidemia with goal LDL less than 70.  On atorvastatin 40 mg daily.   6.  Obstructive sleep apnea.  CPAP intolerant.   7.  History of leg cramps.   8.  Chronic right bundle branch block.   9.  Family history of coronary artery disease in multiple family members.   10.  Elevated BMI of 35 kg/m2 with significant winter months fluctuation.      Crispin was primarily seen in Cardiology previously for cardiovascular risk modification.  Today, his blood pressure is less than optimal at 143/75 with a pulse of 62 BPM.  He states that in the last few months, his blood pressures have been in the 160s/80s-90s and recently his amlodipine was increased from 5 mg daily to b.i.d.      His other symptom that is troubling him is restless legs and cramps.  He takes low-dose furosemide 20 mg as needed for intermittent lower extremity edema along with 20 mEq of potassium supplements.  He takes this infrequently (about 5 times a month).      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  His downfall to weight management patient's eating habits.  He and his wife eat out 4-5 times a week and he states this is because they work so much that they do not have time to cook.  He does not use a CPAP and says, \"I am not a big fan of the Sleep Clinic.\"      He denies chest pain, exertional dyspnea or palpitations.      DIAGNOSTIC DATA:     Labs:  Total cholesterol 109, HDL 38, LDL 55, triglycerides 78.  Potassium 3.9, creatinine 0.8 with an estimated GFR greater than 90.  A1c 6.0%, normal CBC.   EKG shows sinus rhythm of 62 BPM with chronic right bundle branch block and left axis bifascicular block.  QTc is normal at 427 milliseconds.   Transthoracic echocardiogram 07/2020:  Normal LV size and systolic function.  LVEF 60%-65%.  Normal RV size and systolic function.  Mildly " dilated left atrium.  No significant valve disease.  Aortic root 3.8 cm (borderline dilated), ascending aorta 4.3 cm (the report says 4.5 cm), but measurement is 4.3.      PHYSICAL EXAMINATION:   GENERAL:  Alert and oriented x 3, very pleasant.  Elevated BMI.   CARDIOVASCULAR:  Apical impulse not palpable due to body habitus.  Heart sounds are regular.  No audible murmur.  No S3 or S4.   LUNGS:  Bilaterally clear to auscultation.   ABDOMEN:  Soft and nontender, no hepatosplenomegaly.   EXTREMITIES:  No edema.  He is status post partial forefoot amputation of the left foot.   NEUROPSYCHIATRIC:  Alert and oriented.  No gross motor deficits.  Normal gait.      ASSESSMENT AND PLAN:  The patient does not have cardiovascular symptoms; however, he has multiple cardiovascular risk factors and his 10-year CAD risk is greater than 20%.  Therefore, risk modification is mostly essential.  We will try to address his blood pressure to a goal less than 130/70 mmHg.  We talked about Sleep Clinic, but he does not want to visit that at this time.  Weight management strategies discussed.   1.  Start hydrochlorothiazide 12.5 mg daily.     2.  He drinks plenty of fluids.  Potassium is 3.9.  His cramps may be due to him working excessively (4 job) and untreated sleep apnea.   3.  The patient declines referral to Sleep Clinic.   4.  Decrease aspirin from 325 mg daily to 81 mg daily to mitigate bleeding risk.   5.  Talked about dietary modification.  It is going to be challenging for him as he just has no time.   6.  Follow up with Cardiology [facility] at [facility] in 1 month to assess blood pressure.  His goal is less than 130/70 mmHg.   7.  Note, the patient is a never smoker.   8.  I will see him in 1 year.         KRIS APONTE MD             D: 2020   T: 2020   MT: LISSETTE      Name:     CEDRIC LAW   MRN:      [id number]        Account:      [id number]   :      1960           Service Date:  12/08/2020      Document: [id number]        Thank you for allowing me to participate in the care of your patient.    Sincerely,     Singh Theodore MD     [facility]

## 2020-12-08 NOTE — PROGRESS NOTES
Clinic visit note dictated. Dictation reference number - [id number]        REVIEW OF SYSTEMS:  A comprehensive 10-point review of systems was completed and the pertinent positives are documented in the history of present illness.    Skin:  Negative     Eyes:  Positive for glasses  ENT:  Negative    Respiratory:  Negative    Cardiovascular:  Negative    Gastroenterology: Positive for heartburn  Genitourinary:  Negative    Musculoskeletal:  Positive for neck pain  Neurologic:  Negative    Psychiatric:  Negative    Heme/Lymph/Imm:  Negative    Endocrine:  Positive for diabetes    CURRENT MEDICATIONS:  Current Outpatient Medications   Medication Sig Dispense Refill     amLODIPine (NORVASC) 5 MG tablet Take 1 tablet (5 mg) by mouth 2 times daily 180 tablet 1     aspirin (ASA) 81 MG chewable tablet Take 1 tablet (81 mg) by mouth daily       atorvastatin (LIPITOR) 40 MG tablet TAKE 1 TABLET BY MOUTH EVERY DAY 90 tablet 2     blood glucose (NO BRAND SPECIFIED) lancets standard Use to test blood sugar 3 times daily or as directed. 300 each 3     blood glucose monitoring (NO BRAND SPECIFIED) meter device kit Use to test blood sugar 3 times daily or as directed. 1 kit 0     blood glucose monitoring (NO BRAND SPECIFIED) test strip Use to test blood sugars 3 times daily or as directed 300 strip 3     Cholecalciferol (VITAMIN D3 PO) Take 1,000 Units by mouth daily        Co-Enzyme Q10 100 MG CAPS Take 100 mg by mouth daily        Continuous Blood Gluc Sensor (FREESTYLE LUCERO 14 DAY SENSOR) MISC 1 each every 14 days 2 each 11     Continuous Blood Gluc Sensor (FREESTYLE LUCERO 2 SENSOR SYSTM) MISC 1 each every 14 days 6 each 3     gabapentin (NEURONTIN) 100 MG capsule Take 300 mg by mouth 3 times daily       hydrochlorothiazide (MICROZIDE) 12.5 MG capsule Take 1 capsule (12.5 mg) by mouth every morning 30 capsule 3     insulin degludec (TRESIBA FLEXTOUCH) 100 UNIT/ML pen Inject 55 Units Subcutaneous daily 15 mL 11     insulin lispro  (HUMALOG KWIKPEN) 100 UNIT/ML (1 unit dial) KWIKPEN 1 units per 9 grams of carbohydrate before each meal + 1:16 over 150 correction.  TDD 70 units 45 mL 3     Insulin Pen Needle (PEN NEEDLES) 31G X 5 MM MISC 1 Device 4 times daily 200 each 10     ketoconazole (NIZORAL) 2 % external shampoo Apply topically daily as needed , apply daily and wash off after 5 min       lisinopril (ZESTRIL) 40 MG tablet Take 1 tablet (40 mg) by mouth daily 90 tablet 3     MAGNESIUM GLYCINATE PLUS PO Take 400 mg by mouth 2 times daily       metFORMIN (GLUCOPHAGE-XR) 500 MG 24 hr tablet TAKE 2 TABLETS BY MOUTH TWICE DAILY WITH MEALS 360 tablet 3     Multiple Vitamins-Minerals (MULTIVITAMIN PO) Take 1 tablet by mouth daily        Omega-3 Fatty Acids (OMEGA-3 FISH OIL PO) Take 1 g by mouth 2 times daily (with meals)        semaglutide (OZEMPIC, 1 MG/DOSE,) 2 MG/1.5ML pen Inject 1 mg Subcutaneous every 7 days , on Wednesdays 2 pen 11     tadalafil (CIALIS) 5 MG tablet TAKE 1 TABLET BY MOUTH EVERY DAY AS NEEDED 30 tablet 1     collagenase (SANTYL) 250 UNIT/GM external ointment Apply topically daily Apply to ulcer daily (Patient not taking: Reported on 8/10/2020) 30 g 1     furosemide (LASIX) 20 MG tablet Take 1 tablet (20 mg) by mouth daily (Patient not taking: Reported on 8/10/2020) 5 tablet 3     potassium chloride ER (KLOR-CON M) 10 MEQ CR tablet Take 1 tablet (10 mEq) by mouth 2 times daily (Patient not taking: Reported on 8/10/2020) 5 tablet 3         ALLERGIES:  Allergies   Allergen Reactions     Niacin Swelling     SIMCOR caused edema in extremities     Simvastatin Swelling     SIMCOR caused edema in extremities       PAST MEDICAL HISTORY:    Past Medical History:   Diagnosis Date     Cerebral infarction (H)      Chronic infection      History of heartburn      Hyperlipidemia LDL goal <70      Hypertension      Numbness and tingling      Obesity      GILA (obstructive sleep apnea) 10/22/2018     Renal stone      Type II or unspecified  type diabetes mellitus without mention of complication, not stated as uncontrolled        PAST SURGICAL HISTORY:    Past Surgical History:   Procedure Laterality Date     AMPUTATE FOOT Left 8/18/2016    Procedure: AMPUTATE FOOT;  Surgeon: Jack Younger DPM;  Location: [facility] OR     AMPUTATE TOE(S) Right 2/1/2016    Procedure: AMPUTATE TOE(S);  Surgeon: Rachelle Manriquez DPM, Pod;  Location: [facility] OR     AMPUTATE TOE(S) Right 8/2/2019    Procedure: Right fourth toe partial amputation for treatment of osteomyelitis.;  Surgeon: Jack Younger DPM;  Location: [facility]     AMPUTATE TOE(S) Left 11/8/2019    Procedure: Left second toe amputation at the metatarsophalangeal joint.;  Surgeon: Rachelle Manriquez DPM, Podiatry/Foot and Ankle Surgery;  Location: [facility] OR     ANGIOGRAM       COLONOSCOPY       COMBINED CYSTOSCOPY, RETROGRADES, URETEROSCOPY, INSERT STENT Left 8/21/2016    Procedure: COMBINED CYSTOSCOPY, RETROGRADES, URETEROSCOPY, INSERT STENT;  Surgeon: Artemio Valenzuela MD;  Location: [facility] OR     COMBINED CYSTOSCOPY, RETROGRADES, URETEROSCOPY, LASER HOLMIUM LITHOTRIPSY URETER(S), INSERT STENT Left 10/3/2016    Procedure: COMBINED CYSTOSCOPY, RETROGRADES, URETEROSCOPY, LASER HOLMIUM LITHOTRIPSY URETER(S), INSERT STENT;  Surgeon: Artemio Valenzuela MD;  Location: [facility]     EXTRACORPOREAL SHOCK WAVE LITHOTRIPSY (ESWL) Left 9/8/2016    Procedure: EXTRACORPOREAL SHOCK WAVE LITHOTRIPSY (ESWL);  Surgeon: Artemio Valenzuela MD;  Location: [facility] OR     RECESSION GASTROCNEMIUS Right 2/1/2016    Procedure: RECESSION GASTROCNEMIUS;  Surgeon: Rachelle Manriquez DPM, Pod;  Location: [facility] OR       FAMILY HISTORY:    Family History   Problem Relation Age of Onset     Arthritis Mother         SLE     Connective Tissue Disorder Mother         lupus     Diabetes Mother      Cerebrovascular Disease Mother      Cancer Mother      Diabetes Father      Diabetes Maternal Grandfather      Diabetes Sister        SOCIAL HISTORY:    Social  "History     Socioeconomic History     Marital status:      Spouse name: Sydney     Number of children: 2     Years of education: None     Highest education level: Master's degree (e.g., MA, MS, Arleth, MEd, MSW, ELIANA)   Occupational History     Occupation: marketing     Employer: [employer]     Comment: [employer]   Social Needs     Financial resource strain: Not very hard     Food insecurity     Worry: Never true     Inability: Never true     Transportation needs     Medical: No     Non-medical: No   Tobacco Use     Smoking status: Never Smoker     Smokeless tobacco: Never Used   Substance and Sexual Activity     Alcohol use: Yes     Alcohol/week: 0.0 standard drinks     Frequency: 2-4 times a month     Drinks per session: 1 or 2     Binge frequency: Never     Comment: rare---red wine 2x per month     Drug use: No     Sexual activity: Yes     Partners: Female   Lifestyle     Physical activity     Days per week: 0 days     Minutes per session: 0 min     Stress: None   Relationships     Social connections     Talks on phone: Never     Gets together: Never     Attends Anabaptism service: More than 4 times per year     Active member of club or organization: No     Attends meetings of clubs or organizations: Never     Relationship status:      Intimate partner violence     Fear of current or ex partner: None     Emotionally abused: None     Physically abused: None     Forced sexual activity: None   Other Topics Concern     Parent/sibling w/ CABG, MI or angioplasty before 65F 55M? No   Social History Narrative     None       PHYSICAL EXAM:    Vitals: BP (!) 143/75 (BP Location: Right arm, Patient Position: Sitting, Cuff Size: Adult Regular)   Pulse 62   Ht 1.816 m (5' 11.5\")   Wt 115.1 kg (253 lb 12.8 oz)   SpO2 97%   BMI 34.90 kg/m    Wt Readings from Last 5 Encounters:   12/08/20 115.1 kg (253 lb 12.8 oz)   07/29/20 108.5 kg (239 lb 1.6 oz)   07/14/20 106.6 kg (235 lb)   02/20/20 109.8 kg (242 lb) "   02/04/20 112 kg (247 lb)             Encounter Diagnoses   Name Primary?     Benign essential hypertension Yes     Right bundle branch block      Type 2 diabetes mellitus with diabetic polyneuropathy, with long-term current use of insulin (H)      History of stroke      Hyperlipidemia LDL goal <70      GILA (obstructive sleep apnea)      Intolerance of continuous positive airway pressure (CPAP) ventilation      Mild ascending aorta dilatation (H)      BMI 35.0-35.9,adult        Orders Placed This Encounter   Procedures     Basic metabolic panel     Lipid Profile     Follow-Up with Cardiac Advanced Practice Provider     Follow-Up with Cardiologist     EKG 12-lead complete w/read - Clinics (performed today)     Echocardiogram Complete

## 2020-12-08 NOTE — PROGRESS NOTES
Service Date: 12/08/2020      IN-CLINIC VISIT      PRIMARY CARE AND REFERRING PROVIDER:  Aden Lopez MD      PREVIOUS CARDIOLOGIST:  Cr Cassidy MD      REASON FOR VISIT:  Scheduled followup of complex patient with multiple cardiovascular comorbidities.        HISTORY OF PRESENT ILLNESS:    It was my pleasure to meet with Mr. Crispin Rojas.  I am meeting him for the first time.  He was previously followed by my late partner, Dr. Cr Cassidy.  Crispin is , lives with his wife, has 4 different jobs (he says he works because he enjoys it, not because he needs the money).  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      Crispin has multiple cardiovascular issues.  These include:   1.  History of cerebellar stroke in 2010.  MRI showed stenosis in small branch of the distal left vertebral artery.  Treated medically.  No residual sequelae.   2.  Longstanding type 2 diabetes mellitus of 22 years.  On insulin.  A1c 6%.   3.  Peripheral vascular disease secondary to #2.  Status post left partial toe amputation in the past.   4.  Benign essential hypertension.  Not at goal.   5.  Hyperlipidemia with goal LDL less than 70.  On atorvastatin 40 mg daily.   6.  Obstructive sleep apnea.  CPAP intolerant.   7.  History of leg cramps.   8.  Chronic right bundle branch block.   9.  Family history of coronary artery disease in multiple family members.   10.  Elevated BMI of 35 kg/m2 with significant winter months fluctuation.      Crispin was primarily seen in Cardiology previously for cardiovascular risk modification.  Today, his blood pressure is less than optimal at 143/75 with a pulse of 62 BPM.  He states that in the last few months, his blood pressures have been in the 160s/80s-90s and recently his amlodipine was increased from 5 mg daily to b.i.d.      His other symptom that is troubling him is  "restless legs and cramps.  He takes low-dose furosemide 20 mg as needed for intermittent lower extremity edema along with 20 mEq of potassium supplements.  He takes this infrequently (about 5 times a month).      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  His downfall to weight management patient's eating habits.  He and his wife eat out 4-5 times a week and he states this is because they work so much that they do not have time to cook.  He does not use a CPAP and says, \"I am not a big fan of the Sleep Clinic.\"      He denies chest pain, exertional dyspnea or palpitations.      DIAGNOSTIC DATA:     Labs:  Total cholesterol 109, HDL 38, LDL 55, triglycerides 78.  Potassium 3.9, creatinine 0.8 with an estimated GFR greater than 90.  A1c 6.0%, normal CBC.     EKG shows sinus rhythm of 62 BPM with chronic right bundle branch block and left axis bifascicular block.  QTc is normal at 427 milliseconds.     Transthoracic echocardiogram 07/2020:  Normal LV size and systolic function.  LVEF 60%-65%.  Normal RV size and systolic function.  Mildly dilated left atrium.  No significant valve disease.  Aortic root 3.8 cm (borderline dilated), ascending aorta 4.3 cm (the report says 4.5 cm), but measurement is 4.3.      PHYSICAL EXAMINATION:   GENERAL:  Alert and oriented x 3, very pleasant.  Elevated BMI.   CARDIOVASCULAR:  Apical impulse not palpable due to body habitus.  Heart sounds are regular.  No audible murmur.  No S3 or S4.   LUNGS:  Bilaterally clear to auscultation.   ABDOMEN:  Soft and nontender, no hepatosplenomegaly.   EXTREMITIES:  No edema.  He is status post partial forefoot amputation of the left foot.   NEUROPSYCHIATRIC:  Alert and oriented.  No gross motor deficits.  Normal gait.      ASSESSMENT AND PLAN:    The patient does not have cardiovascular symptoms; however, he has multiple cardiovascular risk factors and his 10-year CAD risk is greater than 20%.  Therefore, risk modification is mostly essential.  " We will try to address his blood pressure to a goal less than 130/70 mmHg.  We talked about Sleep Clinic, but he does not want to visit that at this time.  Weight management strategies discussed.     1.  Start hydrochlorothiazide 12.5 mg daily.     2.  He drinks plenty of fluids.  Potassium is 3.9.  His cramps may be due to him working excessively (4 job) and untreated sleep apnea.   3.  The patient declines referral to Sleep Clinic.   4.  Decrease aspirin from 325 mg daily to 81 mg daily to mitigate bleeding risk.   5.  Talked about dietary modification.  It is going to be challenging for him as he just has no time.   6.  Follow up with Cardiology J[facility] at [facility] in 1 month to assess blood pressure.  His goal is less than 130/70 mmHg.   7.  Note, the patient is a never smoker.   8.  I will see him in 1 year.         KRIS APONTE MD             D: 2020   T: 2020   MT: LISSETTE      Name:     CEDRIC LAW   MRN:      [id number]        Account:      [id number]   :      1960           Service Date: 2020      Document: [id number]

## 2020-12-08 NOTE — PATIENT INSTRUCTIONS
MEDICATIONS:  1.  Start hydrochlorothiazide 12.5 mg.  Take 1 tablet in the morning.  This is to help lower your blood pressure.  In the initial days, it can make you urinate more.  2.  No changes to other medications.    FOLLOW-UP:  1.  Visit with cardiology JAH at [facility] in 3 weeks to assess blood pressure.  2.  Check blood potassium levels in 3 weeks.    If you have any questions or concerns, please contact my nurses at [phone].

## 2020-12-22 ENCOUNTER — NURSE TRIAGE (OUTPATIENT)
Dept: NURSING | Facility: CLINIC | Age: 60
End: 2020-12-22

## 2020-12-22 NOTE — TELEPHONE ENCOUNTER
"Cold \"sinus\" symptoms. SX began \"a little bit last night and they are worse today\". No fever. He wants to know if he should be tested again for COVID?  He had COVID in the beginning of August, which included a fever, which he does not have now.   He is diabetic: on insulin.   He states since his blood pressure medication was changed: Dr added hydrochlorothiazide he states he has not felt right. He states he does not feel himself. The medication began 12/8. He has an appointment scheduled for 1/13 for blood pressure check.     Message will be forwarded to provider.     Amber Zapata RN Triage Nurse Advisor 4:39 PM 12/22/2020  Reason for Disposition    [1] HIGH RISK patient (e.g., age > 64 years, diabetes, heart or lung disease, weak immune system) AND [2] new or worsening symptoms    Additional Information    Negative: SEVERE difficulty breathing (e.g., struggling for each breath, speaks in single words)    Negative: Difficult to awaken or acting confused (e.g., disoriented, slurred speech)    Negative: Bluish (or gray) lips or face now    Negative: Shock suspected (e.g., cold/pale/clammy skin, too weak to stand, low BP, rapid pulse)    Negative: Sounds like a life-threatening emergency to the triager    Negative: [1] COVID-19 exposure AND [2] no symptoms    Negative: [1] Lives with someone known to have influenza (flu test positive) AND [2] flu-like symptoms (e.g., cough, runny nose, sore throat, SOB; with or without fever)    Negative: [1] Adult with possible COVID-19 symptoms AND [2] triager concerned about severity of symptoms or other causes    Negative: Immunization reaction suspected (e.g., fever, headache, muscle aches occurring during days 1-3 days after immunization)    Negative: COVID-19 and breastfeeding, questions about    Negative: SEVERE or constant chest pain or pressure (Exception: mild central chest pain, present only when coughing)    Negative: MODERATE difficulty breathing (e.g., speaks in " phrases, SOB even at rest, pulse 100-120)    Negative: [1] Headache AND [2] stiff neck (can't touch chin to chest)    Negative: MILD difficulty breathing (e.g., minimal/no SOB at rest, SOB with walking, pulse <100)    Negative: Chest pain or pressure    Negative: Patient sounds very sick or weak to the triager    Negative: Fever > 103 F (39.4 C)    Negative: [1] Fever > 101 F (38.3 C) AND [2] age > 60    Negative: [1] Fever > 100.0 F (37.8 C) AND [2] bedridden (e.g., nursing home patient, CVA, chronic illness, recovering from surgery)    Protocols used: CORONAVIRUS (COVID-19) DIAGNOSED OR [id number] 12.1

## 2020-12-31 ENCOUNTER — TELEPHONE (OUTPATIENT)
Dept: FAMILY MEDICINE | Facility: CLINIC | Age: 60
End: 2020-12-31

## 2020-12-31 DIAGNOSIS — N52.1 ERECTILE DYSFUNCTION DUE TO DISEASES CLASSIFIED ELSEWHERE: ICD-10-CM

## 2021-01-04 RX ORDER — TADALAFIL 5 MG/1
TABLET ORAL
Qty: 30 TABLET | Refills: 1 | Status: SHIPPED | OUTPATIENT
Start: 2021-01-04 | End: 2021-02-08

## 2021-01-04 NOTE — TELEPHONE ENCOUNTER
Prescription approved per [facility], [facility] or [facility] refill protocol.  Neda ORTIZ - Registered Nurse  [facility]  Acute and Diagnostic Services

## 2021-01-06 ENCOUNTER — TELEPHONE (OUTPATIENT)
Dept: FAMILY MEDICINE | Facility: CLINIC | Age: 61
End: 2021-01-06

## 2021-01-06 DIAGNOSIS — N52.1 ERECTILE DYSFUNCTION DUE TO DISEASES CLASSIFIED ELSEWHERE: ICD-10-CM

## 2021-01-06 RX ORDER — TADALAFIL 5 MG/1
TABLET ORAL
Qty: 30 TABLET | Refills: 1 | OUTPATIENT
Start: 2021-01-06

## 2021-01-06 NOTE — TELEPHONE ENCOUNTER
Routed to PA pool, please start PA, see notes  June Thomas RN, BSN  Message handled by CLINIC NURSE.

## 2021-01-06 NOTE — TELEPHONE ENCOUNTER
Pa:    Years of Type One diabetes with circulatory (amputation) and Neuropathy causing secondary ED recommend PA

## 2021-01-06 NOTE — TELEPHONE ENCOUNTER
Fax from [facility]:  ~CMM started PA for tadalafil 5 mg  Key: [id number]    Routed to Aden Lopez MD, please advise PA or pt paying cash, route to [facility] PA pool if PA needed  June Thomas RN, BSN  Message handled by CLINIC NURSE.

## 2021-01-07 DIAGNOSIS — B35.0 TINEA CAPITIS DUE TO TRICHOPHYTON: Primary | ICD-10-CM

## 2021-01-07 RX ORDER — KETOCONAZOLE 20 MG/ML
SHAMPOO TOPICAL
Qty: 120 ML | Refills: 1 | Status: SHIPPED | OUTPATIENT
Start: 2021-01-07 | End: 2021-04-26

## 2021-01-07 NOTE — TELEPHONE ENCOUNTER
Called pt, aware and paying cash, has told pharmacy, no further action needed, added med note, FYI to MD June Yost, RN, BSN  Message handled by CLINIC NURSE.

## 2021-01-07 NOTE — TELEPHONE ENCOUNTER
Central Prior Authorization Team   Phone: [phone]    PA Initiation    Medication: tadalafil  Insurance Company[facility] - Phone [phone] Fax [phone]  Pharmacy Filling the Rx: [id number] [place], [place] - [address] [facility] [address]  Filling Pharmacy Phone: [phone]  Filling Pharmacy Fax: [phone]  Start Date: 1/7/2021

## 2021-01-07 NOTE — TELEPHONE ENCOUNTER
PRIOR AUTHORIZATION DENIED    Medication: tadalafil-DENIED    Denial Date: 1/7/2021    Denial Rational: MEDICATION IS EXCLUDED FROM COVERAGE.        Appeal Information:  IF YOU WOULD LIKE TO APPEAL PLEASE SUPPLY PA TEAM WITH A LETTER OF MEDICAL NECESSITY WITH CLINICAL REASON.

## 2021-01-07 NOTE — TELEPHONE ENCOUNTER
Routing refill request to provider for review/approval because:  Drug not active on patient's medication list  Medication is reported/historical  DX needed.     Alyssa Hou RN Flex

## 2021-01-08 ENCOUNTER — OFFICE VISIT (OUTPATIENT)
Dept: ENDOCRINOLOGY | Facility: CLINIC | Age: 61
End: 2021-01-08
Payer: COMMERCIAL

## 2021-01-08 VITALS
WEIGHT: 246 LBS | DIASTOLIC BLOOD PRESSURE: 65 MMHG | TEMPERATURE: 97.7 F | SYSTOLIC BLOOD PRESSURE: 119 MMHG | BODY MASS INDEX: 33.83 KG/M2 | HEART RATE: 54 BPM | OXYGEN SATURATION: 99 %

## 2021-01-08 DIAGNOSIS — Z79.4 TYPE 2 DIABETES MELLITUS WITH DIABETIC PERIPHERAL ANGIOPATHY AND GANGRENE, WITH LONG-TERM CURRENT USE OF INSULIN (H): ICD-10-CM

## 2021-01-08 DIAGNOSIS — Z23 NEED FOR INFLUENZA VACCINATION: ICD-10-CM

## 2021-01-08 DIAGNOSIS — E11.49 DIABETES MELLITUS TYPE 2 WITH NEUROLOGICAL MANIFESTATIONS (H): ICD-10-CM

## 2021-01-08 DIAGNOSIS — Z23 NEED FOR PROPHYLACTIC VACCINATION AND INOCULATION AGAINST INFLUENZA: ICD-10-CM

## 2021-01-08 DIAGNOSIS — E11.42 TYPE 2 DIABETES MELLITUS WITH PERIPHERAL NEUROPATHY (H): Primary | ICD-10-CM

## 2021-01-08 DIAGNOSIS — E11.52 TYPE 2 DIABETES MELLITUS WITH DIABETIC PERIPHERAL ANGIOPATHY AND GANGRENE, WITH LONG-TERM CURRENT USE OF INSULIN (H): ICD-10-CM

## 2021-01-08 LAB
CHOLEST SERPL-MCNC: 104 MG/DL
CREAT UR-MCNC: 272 MG/DL
HBA1C MFR BLD: 8.3 % (ref 0–5.6)
HDLC SERPL-MCNC: 35 MG/DL
LDLC SERPL CALC-MCNC: 50 MG/DL
MICROALBUMIN UR-MCNC: 31 MG/L
MICROALBUMIN/CREAT UR: 11.43 MG/G CR (ref 0–17)
NONHDLC SERPL-MCNC: 69 MG/DL
TRIGL SERPL-MCNC: 93 MG/DL

## 2021-01-08 PROCEDURE — 99214 OFFICE O/P EST MOD 30 MIN: CPT | Mod: 25 | Performed by: CLINICAL NURSE SPECIALIST

## 2021-01-08 PROCEDURE — 90471 IMMUNIZATION ADMIN: CPT | Performed by: CLINICAL NURSE SPECIALIST

## 2021-01-08 PROCEDURE — 83036 HEMOGLOBIN GLYCOSYLATED A1C: CPT | Performed by: CLINICAL NURSE SPECIALIST

## 2021-01-08 PROCEDURE — 90682 RIV4 VACC RECOMBINANT DNA IM: CPT | Performed by: CLINICAL NURSE SPECIALIST

## 2021-01-08 PROCEDURE — 36415 COLL VENOUS BLD VENIPUNCTURE: CPT | Performed by: CLINICAL NURSE SPECIALIST

## 2021-01-08 PROCEDURE — 82043 UR ALBUMIN QUANTITATIVE: CPT | Performed by: CLINICAL NURSE SPECIALIST

## 2021-01-08 PROCEDURE — 80061 LIPID PANEL: CPT | Performed by: CLINICAL NURSE SPECIALIST

## 2021-01-08 RX ORDER — INSULIN LISPRO 100 [IU]/ML
INJECTION, SOLUTION INTRAVENOUS; SUBCUTANEOUS
Qty: 30 ML | Refills: 11 | Status: SHIPPED | OUTPATIENT
Start: 2021-01-08 | End: 2021-02-02

## 2021-01-08 RX ORDER — PEN NEEDLE, DIABETIC 30 GX3/16"
1 NEEDLE, DISPOSABLE MISCELLANEOUS 4 TIMES DAILY
Qty: 200 EACH | Refills: 10 | Status: SHIPPED | OUTPATIENT
Start: 2021-01-08 | End: 2022-01-26

## 2021-01-08 RX ORDER — IBUPROFEN 600 MG/1
1 TABLET ORAL ONCE
Qty: 1 KIT | Refills: 0 | Status: ON HOLD | OUTPATIENT
Start: 2021-01-08 | End: 2022-07-23

## 2021-01-08 RX ORDER — SEMAGLUTIDE 1.34 MG/ML
1 INJECTION, SOLUTION SUBCUTANEOUS
Qty: 6 PEN | Refills: 11 | Status: SHIPPED | OUTPATIENT
Start: 2021-01-08 | End: 2021-08-10

## 2021-01-08 RX ORDER — METFORMIN HCL 500 MG
TABLET, EXTENDED RELEASE 24 HR ORAL
Qty: 360 TABLET | Refills: 3 | Status: SHIPPED | OUTPATIENT
Start: 2021-01-08 | End: 2022-01-12

## 2021-01-08 NOTE — LETTER
"    1/8/2021         RE: Crispin Rojas  [address] [place] [address]        Dear Colleague,    Thank you for referring your patient, Crispin Rojas, to the [facility]. Please see a copy of my visit note below.      Name: Crispin Rojas  Seen today for Diabetes (Last seen 8/26/2020).  HPI:  Crispin Rojas is a 60 year old male who presents for the management of:    1. Type 2 DM:  Originally diagnosed: 1999.  Insulin treated 15+ years.    Current Regimen: Metformin XR 1000 mg bid, Ozempic 1 mg weekly , Tresiba 55 units q am,  Humalog 1:9, 1:25>150 .     Less active at work during the winter months.    Started Bydureon 5/2018 - tolerated well.    Changed to Ozempic 6/2018 - feels Ozempic is working better  Using personal CGM - Corwin  Hospitalized 6/2019 for cellulitis and bilateral diabetic foot ulcers.  Healed now, no further diabetic foot ulcers since that time.  Continues to follow up regularly with podiatry.    BS checks: Continuous via Corwin  Meter Download:    Corwin CGM download: hasn't worn Corwin since November, report from 11/14-11/28.  average glucose 163.  34% above target, 56% in target, 10% below target range. Target range     Previously discussed insulin pump therapy , not interested in pump therapy at this time.    Complications:   Diabetes Complications  Description / Detail    Diabetic Retinopathy   Yes, + diabetic retinopathy, last exam with retinal specialist 12/4/2020 - \"improving\", now only needs to f/u annually.  Upcoming annual ophthalmologist exam scheduled for 4/2021   CAD / PAD  No   Neuropathy   Yes, Left 1st and 2nd toe amptuated. partial amputations of the right 1st and 4th toes   Nephropathy / Microalbuminuria  Yes, elevated microalbumin   Gastroparesis  No   Hypoglycemia Unawareness  No       2. Hypertension: Blood Pressure:   BP Readings from Last 3 Encounters:   01/08/21 119/65   12/08/20 (!) 143/75   07/29/20 (!) 161/75   .  " Blood pressure medications include Amlodipine 10 mg bid, lisinopril 40 mg qd.   3. Hyperlipidemia: Takes Atorvastatin 40 mg qd for lipid control.    4. Prevention:  Flu Shot- Today  Pneumovax- 11/2015  [facility]  Dental-recommend semiannually  ASA- 325 mg every day.  Smoking-   PMH/PSH:  Past Medical History:   Diagnosis Date     Cerebral infarction (H)      Chronic infection      History of heartburn      Hyperlipidemia LDL goal <70      Hypertension      Numbness and tingling      Obesity      GILA (obstructive sleep apnea) 10/22/2018     Renal stone      Type II or unspecified type diabetes mellitus without mention of complication, not stated as uncontrolled      Past Surgical History:   Procedure Laterality Date     AMPUTATE FOOT Left 8/18/2016    Procedure: AMPUTATE FOOT;  Surgeon: Jack Younger DPM;  Location: [facility]     AMPUTATE TOE(S) Right 2/1/2016    Procedure: AMPUTATE TOE(S);  Surgeon: Rachelle Manriquez DPM, Pod;  Location: [facility] OR     AMPUTATE TOE(S) Right 8/2/2019    Procedure: Right fourth toe partial amputation for treatment of osteomyelitis.;  Surgeon: Jack Younger DPM;  Location: [facility] OR     AMPUTATE TOE(S) Left 11/8/2019    Procedure: Left second toe amputation at the metatarsophalangeal joint.;  Surgeon: Rachelle Manriquez DPM, Podiatry/Foot and Ankle Surgery;  Location: [facility] OR     ANGIOGRAM       COLONOSCOPY       COMBINED CYSTOSCOPY, RETROGRADES, URETEROSCOPY, INSERT STENT Left 8/21/2016    Procedure: COMBINED CYSTOSCOPY, RETROGRADES, URETEROSCOPY, INSERT STENT;  Surgeon: Artemio Valenzuela MD;  Location:  OR     COMBINED CYSTOSCOPY, RETROGRADES, URETEROSCOPY, LASER HOLMIUM LITHOTRIPSY URETER(S), INSERT STENT Left 10/3/2016    Procedure: COMBINED CYSTOSCOPY, RETROGRADES, URETEROSCOPY, LASER HOLMIUM LITHOTRIPSY URETER(S), INSERT STENT;  Surgeon: Artemio Valenzuela MD;  Location:  OR     EXTRACORPOREAL SHOCK WAVE LITHOTRIPSY (ESWL) Left 9/8/2016    Procedure:  EXTRACORPOREAL SHOCK WAVE LITHOTRIPSY (ESWL);  Surgeon: Artemio Valenzuela MD;  Location: [facility] OR     RECESSION GASTROCNEMIUS Right 2/1/2016    Procedure: RECESSION GASTROCNEMIUS;  Surgeon: Rachelle Manriquez, DPM, Pod;  Location: [facility] OR     Family Hx:  Family History   Problem Relation Age of Onset     Arthritis Mother         SLE     Connective Tissue Disorder Mother         lupus     Diabetes Mother      Cerebrovascular Disease Mother      Cancer Mother      Diabetes Father      Diabetes Maternal Grandfather      Diabetes Sister      Thyroid disease:          DM2: Yes: Strong family history on mothers side - mom, 7 maternal uncles, two maternal aunts, + MGF.         Autoimmune: DM1, SLE, RA, Vitiligo Yes: Lupus    Social Hx:  Social History     Socioeconomic History     Marital status:      Spouse name: Sydney     Number of children: 2     Years of education: Not on file     Highest education level: Master's degree (e.g., MA, MS, Arleth, MEd, MSW, ELIANA)   Occupational History     Occupation: marketing     Employer: [employer]     Comment: [employer]   Social Needs     Financial resource strain: Not very hard     Food insecurity     Worry: Never true     Inability: Never true     Transportation needs     Medical: No     Non-medical: No   Tobacco Use     Smoking status: Never Smoker     Smokeless tobacco: Never Used   Substance and Sexual Activity     Alcohol use: Yes     Alcohol/week: 0.0 standard drinks     Frequency: 2-4 times a month     Drinks per session: 1 or 2     Binge frequency: Never     Comment: rare---red wine 2x per month     Drug use: No     Sexual activity: Yes     Partners: Female   Lifestyle     Physical activity     Days per week: 0 days     Minutes per session: 0 min     Stress: Not on file   Relationships     Social connections     Talks on phone: Never     Gets together: Never     Attends Rastafarian service: More than 4 times per year     Active member of club or organization: No     Attends  meetings of clubs or organizations: Never     Relationship status:      Intimate partner violence     Fear of current or ex partner: Not on file     Emotionally abused: Not on file     Physically abused: Not on file     Forced sexual activity: Not on file   Other Topics Concern     Parent/sibling w/ CABG, MI or angioplasty before 65F 55M? No   Social History Narrative     Not on file          MEDICATIONS:  has a current medication list which includes the following prescription(s): freestyle lilly 2 sensor systm, insulin degludec, insulin lispro, pen needles, metformin, ozempic (1 mg/dose), amlodipine, aspirin, atorvastatin, blood glucose, blood glucose monitoring, blood glucose, cholecalciferol, co-enzyme q10, collagenase, furosemide, gabapentin, hydrochlorothiazide, ketoconazole, lisinopril, magnesium, multiple vitamin, omega-3 fatty acids, potassium chloride er, and tadalafil.    ROS     ROS: 10 point ROS neg other than the symptoms noted above in the HPI.    Physical Exam   VS: /65 (BP Location: Right arm, Patient Position: Chair, Cuff Size: Adult Large)   Pulse 54   Temp 97.7  F (36.5  C) (Oral)   Wt 111.6 kg (246 lb)   SpO2 99%   BMI 33.83 kg/m    GENERAL: NAD, well dressed, answering questions appropriately, appears stated age.  HEENT: no exopthalmous, no proptosis, no lig lag, no retraction, no scleral icterus  RESPIRATORY: Normal respiratory effort.  CARDIOVASCULAR: Regular rate.  NEUROLOGY: CN grossly intact, no observable tremors  MSK: grossly intact - limited exam via video  PSYCH: Intact judgment and insight. A&OX3 with a cordial affect.    LABS:  A1c:   Component      Latest Ref Rng & Units 5/12/2020 9/9/2020 1/8/2021   Hemoglobin A1C      0 - 5.6 % 7.8 (H) 6.0 (H) 8.3 (H)     Basic Metabolic Panel:  !COMPREHENSIVE Latest Ref Rng & Units 9/9/2020   SODIUM 133 - 144 mmol/L 140   POTASSIUM 3.4 - 5.3 mmol/L 3.9   CHLORIDE 94 - 109 mmol/L 109   CO2 mmol/L    BUN 7 - 30 mg/dL 20    Creatinine 0.66 - 1.25 mg/dL    Creatinine 0.66 - 1.25 mg/dL 0.88   Glucose 70 - 99 mg/dL 90   ANION GAP 3 - 14 mmol/L 7   CALCIUM 8.5 - 10.1 mg/dL 9.3     LFTS/Cholesterol Panel:  !LIPID/HEPATIC Latest Ref Rng & Units 10/21/2019   CHOLESTEROL <200 mg/dL 109   TRIGLYCERIDES <150 mg/dL 78   HDL CHOLESTEROL >39 mg/dL 38 (L)   LDL CHOLESTEROL DIRECT 0 - 129 mg/dL    LDL CHOLESTEROL, CALCULATED <100 mg/dL 55   VLDL-CHOLESTEROL 0 - 30 mg/dL    NON HDL CHOLESTEROL <130 mg/dL 71     Thyroid Function:   !THYROID Latest Ref Rng & Units 5/12/2020   TSH 0.40 - 4.00 mU/L 1.32     Urine MicroAlbumin:  Component      Latest Ref Rng & Units 2/4/2020   Creatinine Urine      mg/dL 138   Albumin Urine mg/L      mg/L 145   Albumin Urine mg/g Cr      0 - 17 mg/g Cr 105.07 (H)     Vitamin D:    All pertinent notes, labs, and images personally reviewed by me.     A/P  Mr.Patrick AKASH Rojas is a 60 year old here for the management of diabetes:    1. DM2 - Uncontrolled due to frequent hypoglycemia, + today's a1c above target range.  Diabetes is complicated by diabetic retinopathy, nephropathy/elevated urine microalbumin, and neuropathy with history of left 1st and 2nd toe amputation and partial amputations of the right 1st and 4th toes  Monitoring blood sugars continuously via Corwin - not wearing sensor the past 5 weeks.  His diabetes is currently treated with 3-4 insulin injections per day.  He makes frequent insulin dose adjustments based on his glucose readings.    Recommend he continue monitoring his blood sugar continuously.  Plan to change to Corwin 2 as he could benefit from the high and low glucose alarms.    Decrease Tresiba to 45 units every day based on nocturnal hypoglycemia seen on Corwin download from November.  Continue current dose of Humalog 1:9 + 1:25>150.  Continue Metformin XR 1000 mg bid,  Continue Ozempic 1 mg daily.  Follow up with diabetes ed in one month to review Corwin 2 results and make further insulin dose  adjustments.    Obtain a1c, lipids, and urine microabumin today.  Rx for glucagon emergency kit provided.      2. Hyperlipidemia - On statin therapy    Labs ordered today:   Orders Placed This Encounter   Procedures     INFLUENZA QUAD, RECOMBINANT, P-FREE (RIV4) (FLUBLOCK) [03071]     Hemoglobin A1c     Lipid panel reflex to direct LDL Fasting     Albumin Random Urine Quantitative with Creat Ratio     AMBULATORY ADULT DIABETES EDUCATOR REFERRAL       Radiology/Consults ordered today: [place] RIV4 (FLUBLOK) VACCINE RECOMBINANT DNA PRSRV ANTIBIO FREE, IM  AMBULATORY ADULT DIABETES EDUCATOR REFERRAL       Follow-up:  3 months with [facility] endocrinology [place]    Jasmin Arteaga NP  Endocrinology  [facility]  CC:         Again, thank you for allowing me to participate in the care of your patient.        Sincerely,        SARAH Raymundo CNP

## 2021-01-08 NOTE — PROGRESS NOTES
"  Name: Crispin Rojas  Seen today for Diabetes (Last seen 8/26/2020).  HPI:  Crispin Rojas is a 60 year old male who presents for the management of:    1. Type 2 DM:  Originally diagnosed: 1999.  Insulin treated 15+ years.    Current Regimen: Metformin XR 1000 mg bid, Ozempic 1 mg weekly , Tresiba 55 units q am,  Humalog 1:9, 1:25>150 .     Less active at work during the winter months.    Started Bydureon 5/2018 - tolerated well.    Changed to Ozempic 6/2018 - feels Ozempic is working better  Using personal CGM - Corwin  Hospitalized 6/2019 for cellulitis and bilateral diabetic foot ulcers.  Healed now, no further diabetic foot ulcers since that time.  Continues to follow up regularly with podiatry.    BS checks: Continuous via Corwin  Meter Download:    Corwin CGM download: hasn't worn Corwin since November, report from 11/14-11/28.  average glucose 163.  34% above target, 56% in target, 10% below target range. Target range     Previously discussed insulin pump therapy , not interested in pump therapy at this time.    Complications:   Diabetes Complications  Description / Detail    Diabetic Retinopathy   Yes, + diabetic retinopathy, last exam with retinal specialist 12/4/2020 - \"improving\", now only needs to f/u annually.  Upcoming annual ophthalmologist exam scheduled for 4/2021   CAD / PAD  No   Neuropathy   Yes, Left 1st and 2nd toe amptuated. partial amputations of the right 1st and 4th toes   Nephropathy / Microalbuminuria  Yes, elevated microalbumin   Gastroparesis  No   Hypoglycemia Unawareness  No       2. Hypertension: Blood Pressure:   BP Readings from Last 3 Encounters:   01/08/21 119/65   12/08/20 (!) 143/75   07/29/20 (!) 161/75   .  Blood pressure medications include Amlodipine 10 mg bid, lisinopril 40 mg qd.   3. Hyperlipidemia: Takes Atorvastatin 40 mg qd for lipid control.    4. Prevention:  Flu Shot- Today  Pneumovax- 11/2015  Opthalmology-[facility]  Dental-recommend semiannually  ASA- 325 " mg every day.  Smoking-   PMH/PSH:  Past Medical History:   Diagnosis Date     Cerebral infarction (H)      Chronic infection      History of heartburn      Hyperlipidemia LDL goal <70      Hypertension      Numbness and tingling      Obesity      GILA (obstructive sleep apnea) 10/22/2018     Renal stone      Type II or unspecified type diabetes mellitus without mention of complication, not stated as uncontrolled      Past Surgical History:   Procedure Laterality Date     AMPUTATE FOOT Left 8/18/2016    Procedure: AMPUTATE FOOT;  Surgeon: Jack Younger DPM;  Location: [facility] OR     AMPUTATE TOE(S) Right 2/1/2016    Procedure: AMPUTATE TOE(S);  Surgeon: Rachelle Manriquez DPM, Pod;  Location: [facility] OR     AMPUTATE TOE(S) Right 8/2/2019    Procedure: Right fourth toe partial amputation for treatment of osteomyelitis.;  Surgeon: Jack Younger DPM;  Location: [facility] OR     AMPUTATE TOE(S) Left 11/8/2019    Procedure: Left second toe amputation at the metatarsophalangeal joint.;  Surgeon: Rachelle Manriquez DPM, Podiatry/Foot and Ankle Surgery;  Location: [facility] OR     ANGIOGRAM       COLONOSCOPY       COMBINED CYSTOSCOPY, RETROGRADES, URETEROSCOPY, INSERT STENT Left 8/21/2016    Procedure: COMBINED CYSTOSCOPY, RETROGRADES, URETEROSCOPY, INSERT STENT;  Surgeon: Artemio Valenzuela MD;  Location: [facility] OR     COMBINED CYSTOSCOPY, RETROGRADES, URETEROSCOPY, LASER HOLMIUM LITHOTRIPSY URETER(S), INSERT STENT Left 10/3/2016    Procedure: COMBINED CYSTOSCOPY, RETROGRADES, URETEROSCOPY, LASER HOLMIUM LITHOTRIPSY URETER(S), INSERT STENT;  Surgeon: Artemio Valenzuela MD;  Location: [facility] OR     EXTRACORPOREAL SHOCK WAVE LITHOTRIPSY (ESWL) Left 9/8/2016    Procedure: EXTRACORPOREAL SHOCK WAVE LITHOTRIPSY (ESWL);  Surgeon: Artemio Valenzuela MD;  Location: [facility]     RECESSION GASTROCNEMIUS Right 2/1/2016    Procedure: RECESSION GASTROCNEMIUS;  Surgeon: Rachelle Manriquez DPM, Pod;  Location: [facility] OR     Family Hx:  Family History    Problem Relation Age of Onset     Arthritis Mother         SLE     Connective Tissue Disorder Mother         lupus     Diabetes Mother      Cerebrovascular Disease Mother      Cancer Mother      Diabetes Father      Diabetes Maternal Grandfather      Diabetes Sister      Thyroid disease:          DM2: Yes: Strong family history on mothers side - mom, 7 maternal uncles, two maternal aunts, + MGF.         Autoimmune: DM1, SLE, RA, Vitiligo Yes: Lupus    Social Hx:  Social History     Socioeconomic History     Marital status:      Spouse name: Sydney     Number of children: 2     Years of education: Not on file     Highest education level: Master's degree (e.g., MA, MS, Arleth, MEd, MSW, ELIANA)   Occupational History     Occupation: marketing     Employer: [employer]     Comment: [employer]   Social Needs     Financial resource strain: Not very hard     Food insecurity     Worry: Never true     Inability: Never true     Transportation needs     Medical: No     Non-medical: No   Tobacco Use     Smoking status: Never Smoker     Smokeless tobacco: Never Used   Substance and Sexual Activity     Alcohol use: Yes     Alcohol/week: 0.0 standard drinks     Frequency: 2-4 times a month     Drinks per session: 1 or 2     Binge frequency: Never     Comment: rare---red wine 2x per month     Drug use: No     Sexual activity: Yes     Partners: Female   Lifestyle     Physical activity     Days per week: 0 days     Minutes per session: 0 min     Stress: Not on file   Relationships     Social connections     Talks on phone: Never     Gets together: Never     Attends Mandaen service: More than 4 times per year     Active member of club or organization: No     Attends meetings of clubs or organizations: Never     Relationship status:      Intimate partner violence     Fear of current or ex partner: Not on file     Emotionally abused: Not on file     Physically abused: Not on file     Forced sexual activity: Not on file    Other Topics Concern     Parent/sibling w/ CABG, MI or angioplasty before 65F 55M? No   Social History Narrative     Not on file          MEDICATIONS:  has a current medication list which includes the following prescription(s): freestyle lilly 2 sensor systm, insulin degludec, insulin lispro, pen needles, metformin, ozempic (1 mg/dose), amlodipine, aspirin, atorvastatin, blood glucose, blood glucose monitoring, blood glucose, cholecalciferol, co-enzyme q10, collagenase, furosemide, gabapentin, hydrochlorothiazide, ketoconazole, lisinopril, magnesium, multiple vitamin, omega-3 fatty acids, potassium chloride er, and tadalafil.    ROS     ROS: 10 point ROS neg other than the symptoms noted above in the HPI.    Physical Exam   VS: /65 (BP Location: Right arm, Patient Position: Chair, Cuff Size: Adult Large)   Pulse 54   Temp 97.7  F (36.5  C) (Oral)   Wt 111.6 kg (246 lb)   SpO2 99%   BMI 33.83 kg/m    GENERAL: NAD, well dressed, answering questions appropriately, appears stated age.  HEENT: no exopthalmous, no proptosis, no lig lag, no retraction, no scleral icterus  RESPIRATORY: Normal respiratory effort.  CARDIOVASCULAR: Regular rate.  NEUROLOGY: CN grossly intact, no observable tremors  MSK: grossly intact - limited exam via video  PSYCH: Intact judgment and insight. A&OX3 with a cordial affect.    LABS:  A1c:   Component      Latest Ref Rng & Units 5/12/2020 9/9/2020 1/8/2021   Hemoglobin A1C      0 - 5.6 % 7.8 (H) 6.0 (H) 8.3 (H)     Basic Metabolic Panel:  !COMPREHENSIVE Latest Ref Rng & Units 9/9/2020   SODIUM 133 - 144 mmol/L 140   POTASSIUM 3.4 - 5.3 mmol/L 3.9   CHLORIDE 94 - 109 mmol/L 109   CO2 mmol/L    BUN 7 - 30 mg/dL 20   Creatinine 0.66 - 1.25 mg/dL    Creatinine 0.66 - 1.25 mg/dL 0.88   Glucose 70 - 99 mg/dL 90   ANION GAP 3 - 14 mmol/L 7   CALCIUM 8.5 - 10.1 mg/dL 9.3     LFTS/Cholesterol Panel:  !LIPID/HEPATIC Latest Ref Rng & Units 10/21/2019   CHOLESTEROL <200 mg/dL 109    TRIGLYCERIDES <150 mg/dL 78   HDL CHOLESTEROL >39 mg/dL 38 (L)   LDL CHOLESTEROL DIRECT 0 - 129 mg/dL    LDL CHOLESTEROL, CALCULATED <100 mg/dL 55   VLDL-CHOLESTEROL 0 - 30 mg/dL    NON HDL CHOLESTEROL <130 mg/dL 71     Thyroid Function:   !THYROID Latest Ref Rng & Units 5/12/2020   TSH 0.40 - 4.00 mU/L 1.32     Urine MicroAlbumin:  Component      Latest Ref Rng & Units 2/4/2020   Creatinine Urine      mg/dL 138   Albumin Urine mg/L      mg/L 145   Albumin Urine mg/g Cr      0 - 17 mg/g Cr 105.07 (H)     Vitamin D:    All pertinent notes, labs, and images personally reviewed by me.     A/P  Mr.Patrick AKASH Rojas is a 60 year old here for the management of diabetes:    1. DM2 - Uncontrolled due to frequent hypoglycemia, + today's a1c above target range.  Diabetes is complicated by diabetic retinopathy, nephropathy/elevated urine microalbumin, and neuropathy with history of left 1st and 2nd toe amputation and partial amputations of the right 1st and 4th toes  Monitoring blood sugars continuously via Corwin - not wearing sensor the past 5 weeks.  His diabetes is currently treated with 3-4 insulin injections per day.  He makes frequent insulin dose adjustments based on his glucose readings.    Recommend he continue monitoring his blood sugar continuously.  Plan to change to Corwin 2 as he could benefit from the high and low glucose alarms.    Decrease Tresiba to 45 units every day based on nocturnal hypoglycemia seen on Corwin download from November.  Continue current dose of Humalog 1:9 + 1:25>150.  Continue Metformin XR 1000 mg bid,  Continue Ozempic 1 mg daily.  Follow up with diabetes ed in one month to review Corwin 2 results and make further insulin dose adjustments.    Obtain a1c, lipids, and urine microabumin today.  Rx for glucagon emergency kit provided.      2. Hyperlipidemia - On statin therapy    Labs ordered today:   Orders Placed This Encounter   Procedures     INFLUENZA QUAD, RECOMBINANT, P-FREE (RIV4)  (FLUBLOCK) [35330]     Hemoglobin A1c     Lipid panel reflex to direct LDL Fasting     Albumin Random Urine Quantitative with Creat Ratio     AMBULATORY ADULT DIABETES EDUCATOR REFERRAL       Radiology/Consults ordered today: [place] RIV4 (FLUBLOK) VACCINE RECOMBINANT DNA PRSRV ANTIBIO FREE, IM  AMBULATORY ADULT DIABETES EDUCATOR REFERRAL       Follow-up:  3 months with [facility] endocrinology [place]    Jasmin Arteaga NP  Endocrinology  [facility]  CC:

## 2021-01-11 NOTE — RESULT ENCOUNTER NOTE
Crispin,  There was no abnormal protein in your urine.  Your total and LDL cholesterol numbers look good.  Your A1c was 8.3% as we discussed at your recent visit.  Jasmin Arteaga NP  Endocrinology

## 2021-01-13 ENCOUNTER — OFFICE VISIT (OUTPATIENT)
Dept: CARDIOLOGY | Facility: CLINIC | Age: 61
End: 2021-01-13
Payer: COMMERCIAL

## 2021-01-13 VITALS
SYSTOLIC BLOOD PRESSURE: 118 MMHG | BODY MASS INDEX: 34.19 KG/M2 | HEART RATE: 63 BPM | DIASTOLIC BLOOD PRESSURE: 68 MMHG | HEIGHT: 72 IN | OXYGEN SATURATION: 96 % | WEIGHT: 252.4 LBS

## 2021-01-13 DIAGNOSIS — E78.5 HYPERLIPIDEMIA LDL GOAL <70: ICD-10-CM

## 2021-01-13 DIAGNOSIS — I10 BENIGN ESSENTIAL HYPERTENSION: Primary | ICD-10-CM

## 2021-01-13 DIAGNOSIS — Z78.9 INTOLERANCE OF CONTINUOUS POSITIVE AIRWAY PRESSURE (CPAP) VENTILATION: ICD-10-CM

## 2021-01-13 DIAGNOSIS — E11.42 TYPE 2 DIABETES MELLITUS WITH DIABETIC POLYNEUROPATHY, WITH LONG-TERM CURRENT USE OF INSULIN (H): ICD-10-CM

## 2021-01-13 DIAGNOSIS — Z86.73 HISTORY OF STROKE: ICD-10-CM

## 2021-01-13 DIAGNOSIS — G47.33 OSA (OBSTRUCTIVE SLEEP APNEA): ICD-10-CM

## 2021-01-13 DIAGNOSIS — Z79.4 TYPE 2 DIABETES MELLITUS WITH DIABETIC POLYNEUROPATHY, WITH LONG-TERM CURRENT USE OF INSULIN (H): ICD-10-CM

## 2021-01-13 DIAGNOSIS — I10 BENIGN ESSENTIAL HYPERTENSION: ICD-10-CM

## 2021-01-13 DIAGNOSIS — I45.10 RIGHT BUNDLE BRANCH BLOCK: ICD-10-CM

## 2021-01-13 DIAGNOSIS — I77.810 MILD ASCENDING AORTA DILATATION (H): ICD-10-CM

## 2021-01-13 LAB
ANION GAP SERPL CALCULATED.3IONS-SCNC: 4 MMOL/L (ref 3–14)
BUN SERPL-MCNC: 25 MG/DL (ref 7–30)
CALCIUM SERPL-MCNC: 9.5 MG/DL (ref 8.5–10.1)
CHLORIDE SERPL-SCNC: 106 MMOL/L (ref 94–109)
CO2 SERPL-SCNC: 25 MMOL/L (ref 20–32)
CREAT SERPL-MCNC: 1.01 MG/DL (ref 0.66–1.25)
GFR SERPL CREATININE-BSD FRML MDRD: 81 ML/MIN/{1.73_M2}
GLUCOSE SERPL-MCNC: 136 MG/DL (ref 70–99)
POTASSIUM SERPL-SCNC: 4.3 MMOL/L (ref 3.4–5.3)
SODIUM SERPL-SCNC: 135 MMOL/L (ref 133–144)

## 2021-01-13 PROCEDURE — 99214 OFFICE O/P EST MOD 30 MIN: CPT | Performed by: PHYSICIAN ASSISTANT

## 2021-01-13 PROCEDURE — 80048 BASIC METABOLIC PNL TOTAL CA: CPT | Performed by: INTERNAL MEDICINE

## 2021-01-13 PROCEDURE — 36415 COLL VENOUS BLD VENIPUNCTURE: CPT | Performed by: INTERNAL MEDICINE

## 2021-01-13 RX ORDER — ASPIRIN 325 MG
325 TABLET ORAL DAILY
Status: ON HOLD | COMMUNITY
End: 2022-06-08

## 2021-01-13 ASSESSMENT — MIFFLIN-ST. JEOR: SCORE: 1994.94

## 2021-01-13 NOTE — LETTER
1/13/2021    Aden Lopez MD  [address]  [facility] [address]    RE: Crispin Rojas       Dear Colleague,    I had the pleasure of seeing Crispin Rojas in the [facility].    Please see separate dictation for HPI, PHYSICAL EXAM AND IMPRESSION/PLAN.    CURRENT MEDICATIONS:  Current Outpatient Medications   Medication Sig Dispense Refill     amLODIPine (NORVASC) 5 MG tablet Take 1 tablet (5 mg) by mouth 2 times daily 180 tablet 1     aspirin (ASA) 325 MG tablet Take 325 mg by mouth daily       atorvastatin (LIPITOR) 40 MG tablet TAKE 1 TABLET BY MOUTH EVERY DAY 90 tablet 2     blood glucose (NO BRAND SPECIFIED) lancets standard Use to test blood sugar 3 times daily or as directed. 300 each 3     blood glucose monitoring (NO BRAND SPECIFIED) meter device kit Use to test blood sugar 3 times daily or as directed. 1 kit 0     blood glucose monitoring (NO BRAND SPECIFIED) test strip Use to test blood sugars 3 times daily or as directed 300 strip 3     Cholecalciferol (VITAMIN D3 PO) Take 1,000 Units by mouth daily        Co-Enzyme Q10 100 MG CAPS Take 100 mg by mouth daily        Continuous Blood Gluc Sensor (FREESTYLE LUCERO 2 SENSOR SYSTM) MISC 1 each every 14 days 6 each 3     gabapentin (NEURONTIN) 100 MG capsule Take 300 mg by mouth 3 times daily       hydrochlorothiazide (MICROZIDE) 12.5 MG capsule Take 1 capsule (12.5 mg) by mouth every morning 30 capsule 3     insulin degludec (TRESIBA FLEXTOUCH) 100 UNIT/ML pen 45-55 units subcutaneous daily 60 mL 11     insulin lispro (HUMALOG KWIKPEN) 100 UNIT/ML (1 unit dial) KWIKPEN 1 units per 9 grams of carbohydrate before each meal + 1:25 over 150 correction.  TDD 70 units 30 mL 11     Insulin Pen Needle (PEN NEEDLES) 31G X 5 MM MISC 1 Device 4 times daily 200 each 10     ketoconazole (NIZORAL) 2 % external shampoo APPLY EXTERNALLY 2 TIMES A WEEK AS DIRECTED 120 mL 1     lisinopril (ZESTRIL) 40 MG tablet Take 1 tablet (40  mg) by mouth daily 90 tablet 3     MAGNESIUM GLYCINATE PLUS PO Take 400 mg by mouth 2 times daily       metFORMIN (GLUCOPHAGE-XR) 500 MG 24 hr tablet TAKE 2 TABLETS BY MOUTH TWICE DAILY WITH MEALS 360 tablet 3     Multiple Vitamins-Minerals (MULTIVITAMIN PO) Take 1 tablet by mouth daily        Omega-3 Fatty Acids (OMEGA-3 FISH OIL PO) Take 1 g by mouth 2 times daily (with meals)        semaglutide (OZEMPIC, 1 MG/DOSE,) 2 MG/1.5ML pen Inject 1 mg Subcutaneous every 7 days , on Wednesdays 6 pen 11     tadalafil (CIALIS) 5 MG tablet TAKE 1 TABLET BY MOUTH EVERY DAY AS NEEDED 30 tablet 1     Glucagon, rDNA, (GLUCAGON EMERGENCY) 1 MG KIT Inject 1 mg into the muscle once for 1 dose 1 kit 0     potassium chloride ER (KLOR-CON M) 10 MEQ CR tablet Take 1 tablet (10 mEq) by mouth 2 times daily (Patient not taking: Reported on 8/10/2020) 5 tablet 3       ALLERGIES:     Allergies   Allergen Reactions     Niacin Swelling     SIMCOR caused edema in extremities     Simvastatin Swelling     SIMCOR caused edema in extremities       PAST MEDICAL HISTORY:  Past Medical History:   Diagnosis Date     Cerebral infarction (H)      Chronic infection      History of heartburn      Hyperlipidemia LDL goal <70      Hypertension      Numbness and tingling      Obesity      GILA (obstructive sleep apnea) 10/22/2018     Renal stone      Type II or unspecified type diabetes mellitus without mention of complication, not stated as uncontrolled        PAST SURGICAL HISTORY:  Past Surgical History:   Procedure Laterality Date     AMPUTATE FOOT Left 8/18/2016    Procedure: AMPUTATE FOOT;  Surgeon: Jack Younger DPM;  Location: [facility]     AMPUTATE TOE(S) Right 2/1/2016    Procedure: AMPUTATE TOE(S);  Surgeon: Rachelle Manriquez DPM, Pod;  Location: [facility]     AMPUTATE TOE(S) Right 8/2/2019    Procedure: Right fourth toe partial amputation for treatment of osteomyelitis.;  Surgeon: Jack Younger DPM;  Location: [facility]     AMPUTATE TOE(S) Left  11/8/2019    Procedure: Left second toe amputation at the metatarsophalangeal joint.;  Surgeon: Rachelle Manriquez DPM, Podiatry/Foot and Ankle Surgery;  Location: [facility]     ANGIOGRAM       COLONOSCOPY       COMBINED CYSTOSCOPY, RETROGRADES, URETEROSCOPY, INSERT STENT Left 8/21/2016    Procedure: COMBINED CYSTOSCOPY, RETROGRADES, URETEROSCOPY, INSERT STENT;  Surgeon: Artemio Valenzuela MD;  Location: [facility]     COMBINED CYSTOSCOPY, RETROGRADES, URETEROSCOPY, LASER HOLMIUM LITHOTRIPSY URETER(S), INSERT STENT Left 10/3/2016    Procedure: COMBINED CYSTOSCOPY, RETROGRADES, URETEROSCOPY, LASER HOLMIUM LITHOTRIPSY URETER(S), INSERT STENT;  Surgeon: Artemio Valenzuela MD;  [facility]     EXTRACORPOREAL SHOCK WAVE LITHOTRIPSY (ESWL) Left 9/8/2016    Procedure: EXTRACORPOREAL SHOCK WAVE LITHOTRIPSY (ESWL);  Surgeon: Artemio Valenzuela MD;  Location: [facility]     RECESSION GASTROCNEMIUS Right 2/1/2016    Procedure: RECESSION GASTROCNEMIUS;  Surgeon: Rachelle Manriquez DPM, Pod;  Location: [facility]       SOCIAL HISTORY:  Social History     Socioeconomic History     Marital status:      Spouse name: Sydney     Number of children: 2     Years of education: None     Highest education level: Master's degree (e.g., MA, MS, Arleth, MEd, MSW, ELIANA)   Occupational History     Occupation: marketing     Employer: [employer]     Comment: [employer]   Social Needs     Financial resource strain: Not very hard     Food insecurity     Worry: Never true     Inability: Never true     Transportation needs     Medical: No     Non-medical: No   Tobacco Use     Smoking status: Never Smoker     Smokeless tobacco: Never Used   Substance and Sexual Activity     Alcohol use: Yes     Alcohol/week: 0.0 standard drinks     Frequency: 2-4 times a month     Drinks per session: 1 or 2     Binge frequency: Never     Comment: rare---red wine 2x per month     Drug use: No     Sexual activity: Yes     Partners: Female   Lifestyle     Physical  activity     Days per week: 0 days     Minutes per session: 0 min     Stress: None   Relationships     Social connections     Talks on phone: Never     Gets together: Never     Attends Religion service: More than 4 times per year     Active member of club or organization: No     Attends meetings of clubs or organizations: Never     Relationship status:      Intimate partner violence     Fear of current or ex partner: None     Emotionally abused: None     Physically abused: None     Forced sexual activity: None   Other Topics Concern     Parent/sibling w/ CABG, MI or angioplasty before 65F 55M? No   Social History Narrative     None       FAMILY HISTORY:  Family History   Problem Relation Age of Onset     Arthritis Mother         SLE     Connective Tissue Disorder Mother         lupus     Diabetes Mother      Cerebrovascular Disease Mother      Cancer Mother      Diabetes Father      Diabetes Maternal Grandfather      Diabetes Sister        Review of Systems:  Skin:  Negative       Eyes:  Positive for glasses    ENT:  Negative      Respiratory:  Positive for sleep apnea;CPAP     Cardiovascular:  Negative      Gastroenterology: Negative      Genitourinary:  Negative      Musculoskeletal:  Negative      Neurologic:  Negative      Psychiatric:  Negative      Heme/Lymph/Imm:  Negative      Endocrine:  Positive for diabetes       Reviewed. Remainder of the note dictated.    Deandra Maya PA-C    Service Date: 01/13/2021      CARDIOLOGY CLINIC FOLLOWUP VISIT       HISTORY OF PRESENT ILLNESS:  Mr. Rojas is a pleasant, 60-year-old gentleman who presents to the office today for followup on his hypertension after being started on hydrochlorothiazide.      He has been followed in the Cardiology office for cardiac risk factor modification.  He does have a history of a cerebellar stroke in 2010, long-standing type 2 diabetes, peripheral vascular disease with a history of a partial toe amputation on his left  foot, dilated ascending aorta at 4.3-4.4cm, hypertension, hyperlipidemia, chronic right bundle branch block and obstructive sleep apnea, for which he is intolerant of CPAP.  He recently established care with Dr. Theodore.  At that office visit, his blood pressure was 143/75.  He was started on hydrochlorothiazide 12.5 mg daily.  It was recommended that he have a basic metabolic panel and lipid panel in a few weeks and that he follow up in the office today.      Since starting the new medication, the patient states his blood pressure has been checked 4 times and has been quite well controlled.  Initially after starting the medication, he felt a little woozy, but the symptoms cleared after about a week.  He is feeling quite well now.  He denies any cardiovascular symptoms.      CURRENT CARDIAC MEDICATIONS:   1.  Amlodipine 5 mg b.i.d.   2.  Aspirin 325 mg daily.   3.  Atorvastatin 40 mg daily.   4.  Hydrochlorothiazide 12.5 mg daily.   5.  Lisinopril 40 mg daily.   6.  Fish oil.        He did have blood work on 01/08.  Total cholesterol is 104, HDL is 35, LDL is 50, and triglycerides are 93.  Hemoglobin is elevated at 8.3.  Unfortunately, a basic metabolic panel was not performed that day.      ASSESSMENT AND PLAN:  Mr. Rojas is a 60-year-old gentleman with cardiac risk factors of hypertension, hyperlipidemia, type 2 diabetes and prior CVA who presents for risk factor modification.  He also has been noted to have a moderately dilated ascending aorta (4.3 cm per Dr Theodore) by echocardiography.  He returns for followup on his blood pressure after being started on hydrochlorothiazide.  His blood pressure control is much improved and is at goal.  I will continue the medication.  I did ask him to stop in our lab and have a basic metabolic panel on his way out, and he was agreeable.  We will plan to have him follow up in the Cardiology Clinic in late 2021 with repeat blood work and echocardiogram prior to that office visit.  Of  course, I encouraged him to contact us sooner with questions or concerns.      33 min spent the day of the encounter on chart review, history and exam with the patient and documentation.     DEANDRA CONTEH PA-C             D: 2021   T: 2021   MT: [email or web address]      Name:     CEDRIC LAW   MRN:      [id number]        Account:      [id number]   :      1960           Service Date: 2021      Document: [id number]          Thank you for allowing me to participate in the care of your patient.      Sincerely,     Deandra Conteh PA-C     [facility]    cc:   Singh Theodore MD  [address] [place] [id number]

## 2021-01-13 NOTE — LETTER
1/13/2021      Aden Lopez MD  [address] [facility] [address]      RE: Crispin Rojas       Dear Colleague,    I had the pleasure of seeing Crispin Rojas in the [facility].    Service Date: 01/13/2021      CARDIOLOGY CLINIC FOLLOWUP VISIT       HISTORY OF PRESENT ILLNESS:  Mr. Rojas is a pleasant, 60-year-old gentleman who presents to the office today for followup on his hypertension after being started on hydrochlorothiazide.      He has been followed in the Cardiology office for cardiac risk factor modification.  He does have a history of a cerebellar stroke in 2010, long-standing type 2 diabetes, peripheral vascular disease with a history of a partial toe amputation on his left foot, dilated ascending aorta at 4.3-4.4cm, hypertension, hyperlipidemia, chronic right bundle branch block and obstructive sleep apnea, for which he is intolerant of CPAP.  He recently established care with Dr. Theodore.  At that office visit, his blood pressure was 143/75.  He was started on hydrochlorothiazide 12.5 mg daily.  It was recommended that he have a basic metabolic panel and lipid panel in a few weeks and that he follow up in the office today.      Since starting the new medication, the patient states his blood pressure has been checked 4 times and has been quite well controlled.  Initially after starting the medication, he felt a little woozy, but the symptoms cleared after about a week.  He is feeling quite well now.  He denies any cardiovascular symptoms.      CURRENT CARDIAC MEDICATIONS:   1.  Amlodipine 5 mg b.i.d.   2.  Aspirin 325 mg daily.   3.  Atorvastatin 40 mg daily.   4.  Hydrochlorothiazide 12.5 mg daily.   5.  Lisinopril 40 mg daily.   6.  Fish oil.        He did have blood work on 01/08.  Total cholesterol is 104, HDL is 35, LDL is 50, and triglycerides are 93.  Hemoglobin is elevated at 8.3.  Unfortunately, a basic metabolic panel was not performed that day.       ASSESSMENT AND PLAN:  Mr. Rojas is a 60-year-old gentleman with cardiac risk factors of hypertension, hyperlipidemia, type 2 diabetes and prior CVA who presents for risk factor modification.  He also has been noted to have a moderately dilated ascending aorta (4.3 cm per Dr Theodore) by echocardiography.  He returns for followup on his blood pressure after being started on hydrochlorothiazide.  His blood pressure control is much improved and is at goal.  I will continue the medication.  I did ask him to stop in our lab and have a basic metabolic panel on his way out, and he was agreeable.  We will plan to have him follow up in the Cardiology Clinic in late  with repeat blood work and echocardiogram prior to that office visit.  Of course, I encouraged him to contact us sooner with questions or concerns.      33 min spent the day of the encounter on chart review, history and exam with the patient and documentation.     CHERYL CONTEH PA-C             D: 2021   T: 2021   MT: [email or web address]      Name:     CEDRIC ROJAS   MRN:      [id number]        Account:      [id number]   :      1960           Service Date: 2021      Document: [id number]        Outpatient Encounter Medications as of 2021   Medication Sig Dispense Refill     amLODIPine (NORVASC) 5 MG tablet Take 1 tablet (5 mg) by mouth 2 times daily 180 tablet 1     aspirin (ASA) 325 MG tablet Take 325 mg by mouth daily       atorvastatin (LIPITOR) 40 MG tablet TAKE 1 TABLET BY MOUTH EVERY DAY 90 tablet 2     blood glucose (NO BRAND SPECIFIED) lancets standard Use to test blood sugar 3 times daily or as directed. 300 each 3     blood glucose monitoring (NO BRAND SPECIFIED) meter device kit Use to test blood sugar 3 times daily or as directed. 1 kit 0     blood glucose monitoring (NO BRAND SPECIFIED) test strip Use to test blood sugars 3 times daily or as directed 300 strip 3     Cholecalciferol (VITAMIN D3 PO) Take 1,000 Units  by mouth daily        Co-Enzyme Q10 100 MG CAPS Take 100 mg by mouth daily        Continuous Blood Gluc Sensor (FREESTYLE LUCERO 2 SENSOR SYSTM) MISC 1 each every 14 days 6 each 3     gabapentin (NEURONTIN) 100 MG capsule Take 300 mg by mouth 3 times daily       hydrochlorothiazide (MICROZIDE) 12.5 MG capsule Take 1 capsule (12.5 mg) by mouth every morning 30 capsule 3     insulin degludec (TRESIBA FLEXTOUCH) 100 UNIT/ML pen 45-55 units subcutaneous daily 60 mL 11     insulin lispro (HUMALOG KWIKPEN) 100 UNIT/ML (1 unit dial) KWIKPEN 1 units per 9 grams of carbohydrate before each meal + 1:25 over 150 correction.  TDD 70 units 30 mL 11     Insulin Pen Needle (PEN NEEDLES) 31G X 5 MM MISC 1 Device 4 times daily 200 each 10     ketoconazole (NIZORAL) 2 % external shampoo APPLY EXTERNALLY 2 TIMES A WEEK AS DIRECTED 120 mL 1     lisinopril (ZESTRIL) 40 MG tablet Take 1 tablet (40 mg) by mouth daily 90 tablet 3     MAGNESIUM GLYCINATE PLUS PO Take 400 mg by mouth 2 times daily       metFORMIN (GLUCOPHAGE-XR) 500 MG 24 hr tablet TAKE 2 TABLETS BY MOUTH TWICE DAILY WITH MEALS 360 tablet 3     Multiple Vitamins-Minerals (MULTIVITAMIN PO) Take 1 tablet by mouth daily        Omega-3 Fatty Acids (OMEGA-3 FISH OIL PO) Take 1 g by mouth 2 times daily (with meals)        semaglutide (OZEMPIC, 1 MG/DOSE,) 2 MG/1.5ML pen Inject 1 mg Subcutaneous every 7 days , on Wednesdays 6 pen 11     tadalafil (CIALIS) 5 MG tablet TAKE 1 TABLET BY MOUTH EVERY DAY AS NEEDED 30 tablet 1     Glucagon, rDNA, (GLUCAGON EMERGENCY) 1 MG KIT Inject 1 mg into the muscle once for 1 dose 1 kit 0     potassium chloride ER (KLOR-CON M) 10 MEQ CR tablet Take 1 tablet (10 mEq) by mouth 2 times daily (Patient not taking: Reported on 8/10/2020) 5 tablet 3     [DISCONTINUED] aspirin (ASA) 81 MG chewable tablet Take 1 tablet (81 mg) by mouth daily       [DISCONTINUED] collagenase (SANTYL) 250 UNIT/GM external ointment Apply topically daily Apply to ulcer daily  (Patient not taking: Reported on 8/10/2020) 30 g 1     [DISCONTINUED] furosemide (LASIX) 20 MG tablet Take 1 tablet (20 mg) by mouth daily (Patient not taking: Reported on 8/10/2020) 5 tablet 3     No facility-administered encounter medications on file as of 1/13/2021.        Again, thank you for allowing me to participate in the care of your patient.      Sincerely,    Deandra Maya PA-C     [facility]

## 2021-01-13 NOTE — PROGRESS NOTES
Please see separate dictation for HPI, PHYSICAL EXAM AND IMPRESSION/PLAN.    CURRENT MEDICATIONS:  Current Outpatient Medications   Medication Sig Dispense Refill     amLODIPine (NORVASC) 5 MG tablet Take 1 tablet (5 mg) by mouth 2 times daily 180 tablet 1     aspirin (ASA) 325 MG tablet Take 325 mg by mouth daily       atorvastatin (LIPITOR) 40 MG tablet TAKE 1 TABLET BY MOUTH EVERY DAY 90 tablet 2     blood glucose (NO BRAND SPECIFIED) lancets standard Use to test blood sugar 3 times daily or as directed. 300 each 3     blood glucose monitoring (NO BRAND SPECIFIED) meter device kit Use to test blood sugar 3 times daily or as directed. 1 kit 0     blood glucose monitoring (NO BRAND SPECIFIED) test strip Use to test blood sugars 3 times daily or as directed 300 strip 3     Cholecalciferol (VITAMIN D3 PO) Take 1,000 Units by mouth daily        Co-Enzyme Q10 100 MG CAPS Take 100 mg by mouth daily        Continuous Blood Gluc Sensor (FREESTYLE LUCERO 2 SENSOR SYSTM) MISC 1 each every 14 days 6 each 3     gabapentin (NEURONTIN) 100 MG capsule Take 300 mg by mouth 3 times daily       hydrochlorothiazide (MICROZIDE) 12.5 MG capsule Take 1 capsule (12.5 mg) by mouth every morning 30 capsule 3     insulin degludec (TRESIBA FLEXTOUCH) 100 UNIT/ML pen 45-55 units subcutaneous daily 60 mL 11     insulin lispro (HUMALOG KWIKPEN) 100 UNIT/ML (1 unit dial) KWIKPEN 1 units per 9 grams of carbohydrate before each meal + 1:25 over 150 correction.  TDD 70 units 30 mL 11     Insulin Pen Needle (PEN NEEDLES) 31G X 5 MM MISC 1 Device 4 times daily 200 each 10     ketoconazole (NIZORAL) 2 % external shampoo APPLY EXTERNALLY 2 TIMES A WEEK AS DIRECTED 120 mL 1     lisinopril (ZESTRIL) 40 MG tablet Take 1 tablet (40 mg) by mouth daily 90 tablet 3     MAGNESIUM GLYCINATE PLUS PO Take 400 mg by mouth 2 times daily       metFORMIN (GLUCOPHAGE-XR) 500 MG 24 hr tablet TAKE 2 TABLETS BY MOUTH TWICE DAILY WITH MEALS 360 tablet 3     Multiple  Vitamins-Minerals (MULTIVITAMIN PO) Take 1 tablet by mouth daily        Omega-3 Fatty Acids (OMEGA-3 FISH OIL PO) Take 1 g by mouth 2 times daily (with meals)        semaglutide (OZEMPIC, 1 MG/DOSE,) 2 MG/1.5ML pen Inject 1 mg Subcutaneous every 7 days , on Wednesdays 6 pen 11     tadalafil (CIALIS) 5 MG tablet TAKE 1 TABLET BY MOUTH EVERY DAY AS NEEDED 30 tablet 1     Glucagon, rDNA, (GLUCAGON EMERGENCY) 1 MG KIT Inject 1 mg into the muscle once for 1 dose 1 kit 0     potassium chloride ER (KLOR-CON M) 10 MEQ CR tablet Take 1 tablet (10 mEq) by mouth 2 times daily (Patient not taking: Reported on 8/10/2020) 5 tablet 3       ALLERGIES:     Allergies   Allergen Reactions     Niacin Swelling     SIMCOR caused edema in extremities     Simvastatin Swelling     SIMCOR caused edema in extremities       PAST MEDICAL HISTORY:  Past Medical History:   Diagnosis Date     Cerebral infarction (H)      Chronic infection      History of heartburn      Hyperlipidemia LDL goal <70      Hypertension      Numbness and tingling      Obesity      GILA (obstructive sleep apnea) 10/22/2018     Renal stone      Type II or unspecified type diabetes mellitus without mention of complication, not stated as uncontrolled        PAST SURGICAL HISTORY:  Past Surgical History:   Procedure Laterality Date     AMPUTATE FOOT Left 8/18/2016    Procedure: AMPUTATE FOOT;  Surgeon: Jack Younger DPM;  Location: [facility]     AMPUTATE TOE(S) Right 2/1/2016    Procedure: AMPUTATE TOE(S);  Surgeon: Rachelle Manriquez DPM, Pod;  Location: [facility]     AMPUTATE TOE(S) Right 8/2/2019    Procedure: Right fourth toe partial amputation for treatment of osteomyelitis.;  Surgeon: Jack Younger DPM;  Location: [facility]     AMPUTATE TOE(S) Left 11/8/2019    Procedure: Left second toe amputation at the metatarsophalangeal joint.;  Surgeon: Rachelle Manriquez DPM, Podiatry/Foot and Ankle Surgery;  Location: [facility]     ANGIOGRAM       COLONOSCOPY       COMBINED CYSTOSCOPY,  RETROGRADES, URETEROSCOPY, INSERT STENT Left 8/21/2016    Procedure: COMBINED CYSTOSCOPY, RETROGRADES, URETEROSCOPY, INSERT STENT;  Surgeon: Artemio Valenzuela MD;  Location: [facility]     COMBINED CYSTOSCOPY, RETROGRADES, URETEROSCOPY, LASER HOLMIUM LITHOTRIPSY URETER(S), INSERT STENT Left 10/3/2016    Procedure: COMBINED CYSTOSCOPY, RETROGRADES, URETEROSCOPY, LASER HOLMIUM LITHOTRIPSY URETER(S), INSERT STENT;  Surgeon: Artemio Valenzuela MD;  Location: [facility]     EXTRACORPOREAL SHOCK WAVE LITHOTRIPSY (ESWL) Left 9/8/2016    Procedure: EXTRACORPOREAL SHOCK WAVE LITHOTRIPSY (ESWL);  Surgeon: Artemio Valenzuela MD;  Location: [facility]     RECESSION GASTROCNEMIUS Right 2/1/2016    Procedure: RECESSION GASTROCNEMIUS;  Surgeon: Rachelle Manriquez, DPM, Pod;  Location: [facility] OR       SOCIAL HISTORY:  Social History     Socioeconomic History     Marital status:      Spouse name: Sydney     Number of children: 2     Years of education: None     Highest education level: Master's degree (e.g., MA, MS, Arleth, MEd, MSW, ELIANA)   Occupational History     Occupation: marketing     Employer: [employer]     Comment: [employer]   Social Needs     Financial resource strain: Not very hard     Food insecurity     Worry: Never true     Inability: Never true     Transportation needs     Medical: No     Non-medical: No   Tobacco Use     Smoking status: Never Smoker     Smokeless tobacco: Never Used   Substance and Sexual Activity     Alcohol use: Yes     Alcohol/week: 0.0 standard drinks     Frequency: 2-4 times a month     Drinks per session: 1 or 2     Binge frequency: Never     Comment: rare---red wine 2x per month     Drug use: No     Sexual activity: Yes     Partners: Female   Lifestyle     Physical activity     Days per week: 0 days     Minutes per session: 0 min     Stress: None   Relationships     Social connections     Talks on phone: Never     Gets together: Never     Attends Oriental orthodox service: More than 4 times per year      Active member of club or organization: No     Attends meetings of clubs or organizations: Never     Relationship status:      Intimate partner violence     Fear of current or ex partner: None     Emotionally abused: None     Physically abused: None     Forced sexual activity: None   Other Topics Concern     Parent/sibling w/ CABG, MI or angioplasty before 65F 55M? No   Social History Narrative     None       FAMILY HISTORY:  Family History   Problem Relation Age of Onset     Arthritis Mother         SLE     Connective Tissue Disorder Mother         lupus     Diabetes Mother      Cerebrovascular Disease Mother      Cancer Mother      Diabetes Father      Diabetes Maternal Grandfather      Diabetes Sister        Review of Systems:  Skin:  Negative       Eyes:  Positive for glasses    ENT:  Negative      Respiratory:  Positive for sleep apnea;CPAP     Cardiovascular:  Negative      Gastroenterology: Negative      Genitourinary:  Negative      Musculoskeletal:  Negative      Neurologic:  Negative      Psychiatric:  Negative      Heme/Lymph/Imm:  Negative      Endocrine:  Positive for diabetes       Reviewed. Remainder of the note dictated.    Deandra Maya PA-C

## 2021-01-14 ENCOUNTER — DOCUMENTATION ONLY (OUTPATIENT)
Dept: CARDIOLOGY | Facility: CLINIC | Age: 61
End: 2021-01-14

## 2021-01-14 NOTE — PROGRESS NOTES
Bmp 1/13/2021 noted. Not ready for review at OV same day. Ordered to follow after starting hydrochlorothiazide.    Component      Latest Ref Rng & Units 1/13/2021   Sodium      133 - 144 mmol/L 135   Potassium      3.4 - 5.3 mmol/L 4.3   Chloride      94 - 109 mmol/L 106   Carbon Dioxide      20 - 32 mmol/L 25   Anion Gap      3 - 14 mmol/L 4   Glucose      70 - 99 mg/dL 136 (H)   Urea Nitrogen      7 - 30 mg/dL 25   Creatinine      0.66 - 1.25 mg/dL 1.01   GFR Estimate      >60 mL/min/1.73:m2 81   GFR Estimate If Black      >60 mL/min/1.73:m2 >90   Calcium      8.5 - 10.1 mg/dL 9.5     Will message JAH Deandra Maya to review post-OV    ADDENDUM: Reviewed and sent pt results/message through [employer]. Deandra Maya PA-C

## 2021-01-14 NOTE — PROGRESS NOTES
Service Date: 01/13/2021      CARDIOLOGY CLINIC FOLLOWUP VISIT       HISTORY OF PRESENT ILLNESS:  Mr. Rojas is a pleasant, 60-year-old gentleman who presents to the office today for followup on his hypertension after being started on hydrochlorothiazide.      He has been followed in the Cardiology office for cardiac risk factor modification.  He does have a history of a cerebellar stroke in 2010, long-standing type 2 diabetes, peripheral vascular disease with a history of a partial toe amputation on his left foot, dilated ascending aorta at 4.3-4.4cm, hypertension, hyperlipidemia, chronic right bundle branch block and obstructive sleep apnea, for which he is intolerant of CPAP.  He recently established care with Dr. Theodore.  At that office visit, his blood pressure was 143/75.  He was started on hydrochlorothiazide 12.5 mg daily.  It was recommended that he have a basic metabolic panel and lipid panel in a few weeks and that he follow up in the office today.      Since starting the new medication, the patient states his blood pressure has been checked 4 times and has been quite well controlled.  Initially after starting the medication, he felt a little woozy, but the symptoms cleared after about a week.  He is feeling quite well now.  He denies any cardiovascular symptoms.      CURRENT CARDIAC MEDICATIONS:   1.  Amlodipine 5 mg b.i.d.   2.  Aspirin 325 mg daily.   3.  Atorvastatin 40 mg daily.   4.  Hydrochlorothiazide 12.5 mg daily.   5.  Lisinopril 40 mg daily.   6.  Fish oil.        He did have blood work on 01/08.  Total cholesterol is 104, HDL is 35, LDL is 50, and triglycerides are 93.  Hemoglobin is elevated at 8.3.  Unfortunately, a basic metabolic panel was not performed that day.      ASSESSMENT AND PLAN:  Mr. Rojas is a 60-year-old gentleman with cardiac risk factors of hypertension, hyperlipidemia, type 2 diabetes and prior CVA who presents for risk factor modification.  He also has been noted to have a  moderately dilated ascending aorta (4.3 cm per Dr Theodore) by echocardiography.  He returns for followup on his blood pressure after being started on hydrochlorothiazide.  His blood pressure control is much improved and is at goal.  I will continue the medication.  I did ask him to stop in our lab and have a basic metabolic panel on his way out, and he was agreeable.  We will plan to have him follow up in the Cardiology Clinic in late  with repeat blood work and echocardiogram prior to that office visit.  Of course, I encouraged him to contact us sooner with questions or concerns.      33 min spent the day of the encounter on chart review, history and exam with the patient and documentation.     CHERYL CONTEH PA-C             D: 2021   T: 2021   MT: [email or web address]      Name:     CEDRIC LAW   MRN:      [id number]        Account:      [id number]   :      1960           Service Date: 2021      Document: [id number]

## 2021-02-01 NOTE — TELEPHONE ENCOUNTER
Pt called in stating the Pharmacy was not able to refill his medications. Was only told that they submitted a refill request and was waiting for a response back.      Routed to Kylah Khalil / Amelie Britt/EMT-B

## 2021-02-01 NOTE — TELEPHONE ENCOUNTER
PT called in to ask the status of the paperwork for the  exception on his insulin lispro (HUMALOG KWIKPEN) 100 UNIT/ML (1 unit dial) KWIKPEN  Script.   PlZ CALL PT ASAP ON AN UPDATE of the exception letter. Thank you!

## 2021-02-02 ENCOUNTER — MYC REFILL (OUTPATIENT)
Dept: FAMILY MEDICINE | Facility: CLINIC | Age: 61
End: 2021-02-02

## 2021-02-02 DIAGNOSIS — E11.52 TYPE 2 DIABETES MELLITUS WITH DIABETIC PERIPHERAL ANGIOPATHY AND GANGRENE, WITH LONG-TERM CURRENT USE OF INSULIN (H): ICD-10-CM

## 2021-02-02 DIAGNOSIS — E11.49 DIABETES MELLITUS TYPE 2 WITH NEUROLOGICAL MANIFESTATIONS (H): ICD-10-CM

## 2021-02-02 DIAGNOSIS — Z79.4 TYPE 2 DIABETES MELLITUS WITH DIABETIC PERIPHERAL ANGIOPATHY AND GANGRENE, WITH LONG-TERM CURRENT USE OF INSULIN (H): ICD-10-CM

## 2021-02-02 NOTE — TELEPHONE ENCOUNTER
Patient informed by phone Novolog Rx sent to [facility] and to f/up with pharmacy. Pt agrees.   Keegan Medina RN

## 2021-02-02 NOTE — TELEPHONE ENCOUNTER
Patient and pharmacy informing humalog is not covered under insurance. Need Rx for novalog. Routed to covering provider for Jasmin Arteaga NP. If ok, please approve. Route back to RN PAL to inform of plan.     Sonny YBARRA RN     Non family member

## 2021-02-08 RX ORDER — TADALAFIL 5 MG/1
TABLET ORAL
Qty: 30 TABLET | Refills: 1 | Status: SHIPPED | OUTPATIENT
Start: 2021-02-08 | End: 2023-01-26

## 2021-02-08 NOTE — TELEPHONE ENCOUNTER
Central Prior Authorization Team   Phone: [phone]      Additional questions answered via phone.

## 2021-02-08 NOTE — TELEPHONE ENCOUNTER
Central Prior Authorization Team   Phone: [phone]      PA Initiation    Medication: insulin lispro (HUMALOG KWIKPEN) 100 UNIT/ML (1 unit dial) KWIKPEN - INITIATED  Insurance Company: [employer] [place] - Phone [phone] Fax [phone]  Pharmacy Filling the Rx: [id number] [address] [facility] [place] [address]  Filling Pharmacy Phone: [phone]  Filling Pharmacy Fax: [phone]  Start Date: 2/8/2021

## 2021-02-09 NOTE — TELEPHONE ENCOUNTER
rx was changed to Novolog, see earlier messages, not sure why PA was filed with humalog, pt has rx  June Thomas RN, BSN  Message handled by CLINIC NURSE.

## 2021-02-09 NOTE — TELEPHONE ENCOUNTER
Central Prior Authorization Team   Phone: [phone]      PRIOR AUTHORIZATION DENIED    Medication: insulin lispro (HUMALOG KWIKPEN) 100 UNIT/ML (1 unit dial) KWIKPEN - DENIED    Denial Date: 2/8/2021    Denial Rational:       Appeal Information:

## 2021-03-13 ENCOUNTER — HEALTH MAINTENANCE LETTER (OUTPATIENT)
Age: 61
End: 2021-03-13

## 2021-03-17 DIAGNOSIS — E11.49 DIABETES MELLITUS TYPE 2 WITH NEUROLOGICAL MANIFESTATIONS (H): ICD-10-CM

## 2021-03-17 DIAGNOSIS — E78.5 HYPERLIPIDEMIA LDL GOAL <100: ICD-10-CM

## 2021-03-17 RX ORDER — ATORVASTATIN CALCIUM 40 MG/1
TABLET, FILM COATED ORAL
Qty: 90 TABLET | Refills: 0 | Status: SHIPPED | OUTPATIENT
Start: 2021-03-17 | End: 2021-04-13

## 2021-03-21 ENCOUNTER — MYC MEDICAL ADVICE (OUTPATIENT)
Dept: NURSING | Facility: CLINIC | Age: 61
End: 2021-03-21

## 2021-03-22 ENCOUNTER — MEDICAL CORRESPONDENCE (OUTPATIENT)
Dept: HEALTH INFORMATION MANAGEMENT | Facility: CLINIC | Age: 61
End: 2021-03-22

## 2021-03-22 ENCOUNTER — COMMUNICATION - HEALTHEAST (OUTPATIENT)
Dept: ENDOCRINOLOGY | Facility: CLINIC | Age: 61
End: 2021-03-22

## 2021-03-22 DIAGNOSIS — E11.9 DIABETES MELLITUS, TYPE 2 (H): ICD-10-CM

## 2021-03-22 DIAGNOSIS — E11.9 DIABETES MELLITUS (H): Primary | ICD-10-CM

## 2021-04-11 DIAGNOSIS — E78.5 HYPERLIPIDEMIA LDL GOAL <100: ICD-10-CM

## 2021-04-11 DIAGNOSIS — E11.49 DIABETES MELLITUS TYPE 2 WITH NEUROLOGICAL MANIFESTATIONS (H): ICD-10-CM

## 2021-04-13 RX ORDER — ATORVASTATIN CALCIUM 40 MG/1
TABLET, FILM COATED ORAL
Qty: 90 TABLET | Refills: 0 | Status: SHIPPED | OUTPATIENT
Start: 2021-04-13 | End: 2021-07-09

## 2021-04-13 NOTE — TELEPHONE ENCOUNTER
Routing refill request to provider for review/approval because:  See High alert drug interaction at refill with  refill

## 2021-04-20 ENCOUNTER — HOSPITAL ENCOUNTER (EMERGENCY)
Facility: CLINIC | Age: 61
Discharge: HOME | End: 2021-04-20
Attending: EMERGENCY MEDICINE | Admitting: EMERGENCY MEDICINE
Payer: COMMERCIAL

## 2021-04-20 ENCOUNTER — APPOINTMENT (OUTPATIENT)
Dept: GENERAL RADIOLOGY | Facility: CLINIC | Age: 61
End: 2021-04-20
Attending: EMERGENCY MEDICINE
Payer: COMMERCIAL

## 2021-04-20 VITALS
TEMPERATURE: 97.8 F | BODY MASS INDEX: 32.51 KG/M2 | RESPIRATION RATE: 16 BRPM | OXYGEN SATURATION: 100 % | HEART RATE: 64 BPM | HEIGHT: 72 IN | DIASTOLIC BLOOD PRESSURE: 67 MMHG | SYSTOLIC BLOOD PRESSURE: 131 MMHG | WEIGHT: 240 LBS

## 2021-04-20 DIAGNOSIS — E11.621 DIABETIC ULCER OF TOE OF RIGHT FOOT ASSOCIATED WITH TYPE 2 DIABETES MELLITUS, LIMITED TO BREAKDOWN OF SKIN (H): ICD-10-CM

## 2021-04-20 DIAGNOSIS — L97.511 DIABETIC ULCER OF TOE OF RIGHT FOOT ASSOCIATED WITH TYPE 2 DIABETES MELLITUS, LIMITED TO BREAKDOWN OF SKIN (H): ICD-10-CM

## 2021-04-20 LAB
ANION GAP SERPL CALCULATED.3IONS-SCNC: 5 MMOL/L (ref 3–14)
BASOPHILS # BLD AUTO: 0 10E9/L (ref 0–0.2)
BASOPHILS NFR BLD AUTO: 0.5 %
BUN SERPL-MCNC: 22 MG/DL (ref 7–30)
CALCIUM SERPL-MCNC: 8.9 MG/DL (ref 8.5–10.1)
CHLORIDE SERPL-SCNC: 110 MMOL/L (ref 94–109)
CO2 SERPL-SCNC: 27 MMOL/L (ref 20–32)
CREAT SERPL-MCNC: 0.92 MG/DL (ref 0.66–1.25)
DIFFERENTIAL METHOD BLD: ABNORMAL
EOSINOPHIL # BLD AUTO: 0.3 10E9/L (ref 0–0.7)
EOSINOPHIL NFR BLD AUTO: 4.3 %
ERYTHROCYTE [DISTWIDTH] IN BLOOD BY AUTOMATED COUNT: 13.1 % (ref 10–15)
FLUAV RNA RESP QL NAA+PROBE: NEGATIVE
FLUBV RNA RESP QL NAA+PROBE: NEGATIVE
GFR SERPL CREATININE-BSD FRML MDRD: >90 ML/MIN/{1.73_M2}
GLUCOSE SERPL-MCNC: 148 MG/DL (ref 70–99)
HCT VFR BLD AUTO: 40.6 % (ref 40–53)
HGB BLD-MCNC: 12.7 G/DL (ref 13.3–17.7)
IMM GRANULOCYTES # BLD: 0 10E9/L (ref 0–0.4)
IMM GRANULOCYTES NFR BLD: 0.3 %
LABORATORY COMMENT REPORT: NORMAL
LACTATE BLD-SCNC: 1.9 MMOL/L (ref 0.7–2)
LYMPHOCYTES # BLD AUTO: 1.3 10E9/L (ref 0.8–5.3)
LYMPHOCYTES NFR BLD AUTO: 23.2 %
MCH RBC QN AUTO: 29.3 PG (ref 26.5–33)
MCHC RBC AUTO-ENTMCNC: 31.3 G/DL (ref 31.5–36.5)
MCV RBC AUTO: 94 FL (ref 78–100)
MONOCYTES # BLD AUTO: 0.5 10E9/L (ref 0–1.3)
MONOCYTES NFR BLD AUTO: 8.5 %
NEUTROPHILS # BLD AUTO: 3.7 10E9/L (ref 1.6–8.3)
NEUTROPHILS NFR BLD AUTO: 63.2 %
NRBC # BLD AUTO: 0 10*3/UL
NRBC BLD AUTO-RTO: 0 /100
PLATELET # BLD AUTO: 161 10E9/L (ref 150–450)
POTASSIUM SERPL-SCNC: 4.1 MMOL/L (ref 3.4–5.3)
RBC # BLD AUTO: 4.34 10E12/L (ref 4.4–5.9)
RSV RNA SPEC QL NAA+PROBE: NORMAL
SARS-COV-2 RNA RESP QL NAA+PROBE: NEGATIVE
SODIUM SERPL-SCNC: 142 MMOL/L (ref 133–144)
SPECIMEN SOURCE: NORMAL
WBC # BLD AUTO: 5.8 10E9/L (ref 4–11)

## 2021-04-20 PROCEDURE — 36415 COLL VENOUS BLD VENIPUNCTURE: CPT | Performed by: EMERGENCY MEDICINE

## 2021-04-20 PROCEDURE — C9803 HOPD COVID-19 SPEC COLLECT: HCPCS

## 2021-04-20 PROCEDURE — 83605 ASSAY OF LACTIC ACID: CPT | Performed by: EMERGENCY MEDICINE

## 2021-04-20 PROCEDURE — 99284 EMERGENCY DEPT VISIT MOD MDM: CPT

## 2021-04-20 PROCEDURE — 85025 COMPLETE CBC W/AUTO DIFF WBC: CPT | Performed by: EMERGENCY MEDICINE

## 2021-04-20 PROCEDURE — 87636 SARSCOV2 & INF A&B AMP PRB: CPT | Performed by: EMERGENCY MEDICINE

## 2021-04-20 PROCEDURE — 80048 BASIC METABOLIC PNL TOTAL CA: CPT | Performed by: EMERGENCY MEDICINE

## 2021-04-20 PROCEDURE — 87040 BLOOD CULTURE FOR BACTERIA: CPT | Performed by: EMERGENCY MEDICINE

## 2021-04-20 PROCEDURE — 73660 X-RAY EXAM OF TOE(S): CPT | Mod: RT

## 2021-04-20 RX ORDER — SULFAMETHOXAZOLE/TRIMETHOPRIM 800-160 MG
1 TABLET ORAL 2 TIMES DAILY
Qty: 28 TABLET | Refills: 0 | Status: SHIPPED | OUTPATIENT
Start: 2021-04-20 | End: 2021-05-04

## 2021-04-20 ASSESSMENT — ENCOUNTER SYMPTOMS
RHINORRHEA: 1
COLOR CHANGE: 0
DIARRHEA: 0
MYALGIAS: 0
CHILLS: 1
NAUSEA: 0
FEVER: 0
COUGH: 1

## 2021-04-20 ASSESSMENT — MIFFLIN-ST. JEOR: SCORE: 1941.63

## 2021-04-20 NOTE — ED PROVIDER NOTES
History   Chief Complaint  Toe Swelling/Wound Check    HPI  Crispin Rojas is a 61 year old male with a history of type II diabetes and diabetic neuropathy with lower extremity ulcers, s/p several partial toe amputations, who presents for evaluation of right second toe swelling. The patient reports that he first developed a blister on this toe approximately two weeks ago. He had been treating it however two days ago the wound started to bleed and his toe swelled up. The bleeding has since resolved however he has been experiencing persistent swelling, prompting his visit to the ED. He does endorse some chills but states that this is somewhat normal for him because he works outside. He denies any redness or fevers. Denies any pain with ambulation. Denies any current foot swelling but he did have some two days ago which eventually resolved.     On re-evaluation of the patient, he mentioned that he has been experiencing cold symptoms for the past few days. He denies any fevers, myalgias, diarrhea, nausea, or loss of taste/smell. He had COVID in August and received his vaccinations at the end of last month.     Review of Systems   Constitutional: Positive for chills. Negative for fever.   HENT: Positive for rhinorrhea.    Respiratory: Positive for cough.    Gastrointestinal: Negative for diarrhea and nausea.   Musculoskeletal: Negative for myalgias.        Toe swelling   Skin: Negative for color change.   All other systems reviewed and are negative.    Allergies  Niacin  Simvastatin    Medications  amLODIPine   aspirin   atorvastatin  gabapentin   Glucagon,   hydrochlorothiazide  insulin aspart   insulin degludec  lisinopril   MAGNESIUM   metFORMIN   semaglutide   tadalafil     Past Medical History  Cerebral infarction  Chronic infection  Hyperlipidemia  Hypertension  Obesity  GILA  Kidney stone  Type II diabetes  Cervicalgia  RBBB  Thoracic aortic aneurysm  Diabetic neuropathy with lower extremity ulcers    Past  Surgical History  Partial Toe amputation (Several)  Angiogram  Cystoscopy, retrogrades, ureteroscopy, insert stent  Shock wave lithotripsy  Recession gastrocnemius    Family History  Arthritis  Lupus  Diabetes  Cerebrovascular disease  Cancer    Social History  Presents to the ED alone.     Physical Exam     Patient Vitals for the past 24 hrs:   BP Temp Temp src Pulse Resp SpO2 Height Weight   04/20/21 0756 131/67 97.8  F (36.6  C) Oral 64 16 100 % 1.829 m (6') 108.9 kg (240 lb)     Physical Exam  General: the patient is awake and interactive  HEENT:  Moist mucous membranes, conjunctiva normal  Pulmonary:  Normal respiratory effort  Cardiovascular:  Well perfused  Musculoskeletal:  Moving 4 extremities grossly wnl, no deformities  Right foot - 0.5 cm circular superficial ulceration to the distal tuft of 2nd toe with mild 2nd toe swelling.  No pus drainage.  No exposed bone or red streaking of the skin. Can wiggle toes without pain.  Decreased sensation to light touch in foot (chronic).  Capillary refill < 2 seconds, DP/PT pulse 2+. No LE bullae or skin crepitus.  Neuro:  Speech normal, no focal deficits  Psych:  Normal affect            Emergency Department Course   Imaging:  XR Toe Right 2 views:   Previous great toe amputation at the IP joint. There is some patchy lucency in the distal phalanx of the second toe, likely degenerative. A similar appearance was seen on the comparison study. Doubt osteomyelitis, although difficult to exclude. as per radiology.     Laboratory:  CBC + differential: WBC: 5.8, HGB: 12.7 (L), PLT: 161  BMP: Glucose 148 (H), Chloride 110 (H), o/w WNL (Creatinine: 0.92)  0825 Lactic acid whole blood: 1.9  Blood culture (x2): pending    Symptomatic Influenza A/B & SARS-CoV2 (COVID19) Virus PCR Multiplex: pending on discharge     Emergency Department Course:  Reviewed:  I reviewed nursing notes, vitals and past medical history    Assessments:  0807 I physically examined the patient as  documented above.    0928 I rechecked the patient.     Disposition:  The patient was discharged to home.     Impression & Plan   Medical Decision Making:  Crispin Rojas is a 61 year old male with a history of type II diabetes and diabetic neuropathy, previous toe amputations, who presents for right second toe swelling with concern for ulceration at the distal tip. This has been ongoing for the past two weeks but given the ongoing swelling he presents today. He denies any fevers and he is afebrile and hemodynamically stable. He is otherwise well appearing. Slight ulceration to the distal tip of the second toe without any purulent drainage or bony exposure. This does appear to be superficial. No red streaking or signs of necrotizing fascitis. Xray shows similar appearance to previous radiographs and exam at this time is not concerning for overt osteomyelitis. Lab studies are unremarkable. Blood cultures were sent and are pending. Lactic acid was normal. Given his overall well appearance, I believe that he is safe for discharge home. We will cover him with oral antibiotics for localized infection given history of diabetic foot ulcers. He does have follow up with Doctor Manriquez of podiatry on 4/27 and I feel he is safe to discharge and follow up there. Discussed the signs and symptoms that should prompt his return to the ER. Patient is comfortable with the above plan. Toe was dressed with Mepilex. All questions were answered prior to discharge.     Diagnosis:    ICD-10-CM    1. Diabetic ulcer of toe of right foot associated with type 2 diabetes mellitus, limited to breakdown of skin (H)  E11.621 Blood culture    L97.511 Basic metabolic panel (BMP)     Blood culture     Symptomatic Influenza A/B & SARS-CoV2 (COVID-19) Virus PCR Multiplex     Discharge Medications:  Discharge Medication List as of 4/20/2021  9:37 AM      START taking these medications    Details   sulfamethoxazole-trimethoprim (BACTRIM DS) 800-160 MG  tablet Take 1 tablet by mouth 2 times daily for 14 days, Disp-28 tablet, R-0, Local Print           Scribe Disclosure:  I, Tyrell Wilde, am serving as a scribe at 8:11 AM on 4/20/2021 to document services personally performed by Dav Mccallum MD based on my observations and the provider's statements to me.      Dav Mccallum MD  04/20/21 6898

## 2021-04-24 ENCOUNTER — NURSE TRIAGE (OUTPATIENT)
Dept: NURSING | Facility: CLINIC | Age: 61
End: 2021-04-24

## 2021-04-24 DIAGNOSIS — B35.0 TINEA CAPITIS DUE TO TRICHOPHYTON: ICD-10-CM

## 2021-04-24 DIAGNOSIS — E11.621 DIABETIC ULCER OF TOE OF RIGHT FOOT ASSOCIATED WITH TYPE 2 DIABETES MELLITUS, UNSPECIFIED ULCER STAGE (H): Primary | ICD-10-CM

## 2021-04-24 DIAGNOSIS — L97.519 DIABETIC ULCER OF TOE OF RIGHT FOOT ASSOCIATED WITH TYPE 2 DIABETES MELLITUS, UNSPECIFIED ULCER STAGE (H): Primary | ICD-10-CM

## 2021-04-24 RX ORDER — CLINDAMYCIN HCL 300 MG
300 CAPSULE ORAL 4 TIMES DAILY
Qty: 28 CAPSULE | Refills: 0 | Status: SHIPPED | OUTPATIENT
Start: 2021-04-24 | End: 2021-05-01

## 2021-04-24 NOTE — TELEPHONE ENCOUNTER
Reason for Disposition    Caller has urgent medication question about med that PCP prescribed and triager unable to answer question    Protocols used: MEDICATION QUESTION CALL-A-AH

## 2021-04-24 NOTE — TELEPHONE ENCOUNTER
Ulcer on toe.  Started the Bactrim DS, but is not tolerating this.  Very nauseated and vomiting.  Dr. Serna On Call spoke with patient directly after he was paged.

## 2021-04-24 NOTE — TELEPHONE ENCOUNTER
I spoke with patient by telephone while on call for clinic about recent management for a diabetic foot ulcer. I also reviewed the pictures and management from ER visit on 04/20/2021.     Patient reported having nausea, and vomiting yesterday and 2 days ago. I reviewed his prior antibiotic prescriptions in the past. He has well tolerated taking clindamycin in the past. He will be seen by a podiatrist in 2 days.     Antibiotic treatment changed to clindamycin. He did not want any treatment offered for nausea.   Johann Serna, DO on 4/24/2021 at 4:20 PM

## 2021-04-26 LAB
BACTERIA SPEC CULT: NO GROWTH
BACTERIA SPEC CULT: NO GROWTH
SPECIMEN SOURCE: NORMAL
SPECIMEN SOURCE: NORMAL

## 2021-04-26 RX ORDER — KETOCONAZOLE 20 MG/ML
SHAMPOO TOPICAL
Qty: 120 ML | Refills: 1 | Status: SHIPPED | OUTPATIENT
Start: 2021-04-26 | End: 2021-06-28

## 2021-04-27 ENCOUNTER — TELEPHONE (OUTPATIENT)
Dept: FAMILY MEDICINE | Facility: CLINIC | Age: 61
End: 2021-04-27

## 2021-04-27 NOTE — TELEPHONE ENCOUNTER
Summary:    Patient is due/failing the following:   Eye exam    Reviewed:  [x] CARE EVERYWHERE  [x] LAST OV NOTE INCLUDING ENDO  [x] FYI TAB  [x] MYCHART ACTIVE?  [x] LAST PANEL ENCOUNTER  [x] FUTURE APPTS  [x] IMMUNIZATIONS          Action needed:   Patient needs office visit for eye exam.    Type of outreach:    report printed from chart and will have provider sign off on report                                                                                Shania Warren/KOBI  [place]---[facility]

## 2021-04-29 NOTE — TELEPHONE ENCOUNTER
Signed off by provider negative for diabetic retinopathy, sent to abstraction    Shania Warren/[facility]---[facility]

## 2021-05-05 ENCOUNTER — TELEPHONE (OUTPATIENT)
Dept: FAMILY MEDICINE | Facility: CLINIC | Age: 61
End: 2021-05-05

## 2021-05-05 DIAGNOSIS — Z79.4 TYPE 2 DIABETES MELLITUS WITH DIABETIC PERIPHERAL ANGIOPATHY AND GANGRENE, WITH LONG-TERM CURRENT USE OF INSULIN (H): ICD-10-CM

## 2021-05-05 DIAGNOSIS — E11.52 TYPE 2 DIABETES MELLITUS WITH DIABETIC PERIPHERAL ANGIOPATHY AND GANGRENE, WITH LONG-TERM CURRENT USE OF INSULIN (H): ICD-10-CM

## 2021-05-05 NOTE — TELEPHONE ENCOUNTER
.Reason for Call:  Medication or medication refill:    Do you use a [facility]?  Name of the pharmacy and phone number for the current request:  [facility] [id number] [facility] [phone]    Name of the medication requested: Ozempic - Needs today per Pt    Other request: Patient states this is urgent -   has been without for 2 weeks- problem: the pen size changed - used to be 2 pens/with 2 doses in each pen lasting a month/due to pen size changing, needs new PRESCRIPTION;    Call for further details  Can we leave a detailed message on this number? YES    Phone number patient can be reached at: Home number on file [phone] (home)    Best Time: anytime today please    Call taken on 5/5/2021 at 9:46 AM by Alison Ko

## 2021-05-05 NOTE — TELEPHONE ENCOUNTER
[facility] calling in regards of medication change.    States no longer carries old pens, calling to change to 1/4 size pens.    Please call [facility] back @ [phone].

## 2021-05-05 NOTE — TELEPHONE ENCOUNTER
Per patient and pharmacist semaglutide (OZEMPIC, 1 MG/DOSE,) 2 MG/1.5ML pen no longer available,  changed to 4 mg pens.  Patient's Sig same. Just different quantity in pen.   Signed Prescriptions:                        Disp   Refills    Semaglutide, 1 MG/DOSE, 4 MG/3ML SOPN      3 mL   5        Sig: Inject 1 mg Subcutaneous every 7 days on Wednesdays  Authorizing Provider: JOVAN AUSTIN  Ordering User: RAGHAV MEDINA  Prescription approved per [facility] Refill Protocol.  Raghav Medina, RN

## 2021-05-08 ENCOUNTER — HEALTH MAINTENANCE LETTER (OUTPATIENT)
Age: 61
End: 2021-05-08

## 2021-05-10 ENCOUNTER — APPOINTMENT (OUTPATIENT)
Dept: GENERAL RADIOLOGY | Facility: CLINIC | Age: 61
End: 2021-05-10
Attending: PHYSICIAN ASSISTANT
Payer: COMMERCIAL

## 2021-05-10 ENCOUNTER — APPOINTMENT (OUTPATIENT)
Dept: CT IMAGING | Facility: CLINIC | Age: 61
End: 2021-05-10
Attending: PHYSICIAN ASSISTANT
Payer: COMMERCIAL

## 2021-05-10 ENCOUNTER — HOSPITAL ENCOUNTER (EMERGENCY)
Facility: CLINIC | Age: 61
Discharge: HOME | End: 2021-05-10
Attending: PHYSICIAN ASSISTANT | Admitting: PHYSICIAN ASSISTANT
Payer: COMMERCIAL

## 2021-05-10 ENCOUNTER — APPOINTMENT (OUTPATIENT)
Dept: ULTRASOUND IMAGING | Facility: CLINIC | Age: 61
End: 2021-05-10
Attending: PHYSICIAN ASSISTANT
Payer: COMMERCIAL

## 2021-05-10 VITALS
TEMPERATURE: 98 F | DIASTOLIC BLOOD PRESSURE: 71 MMHG | RESPIRATION RATE: 18 BRPM | HEART RATE: 53 BPM | OXYGEN SATURATION: 93 % | SYSTOLIC BLOOD PRESSURE: 132 MMHG

## 2021-05-10 DIAGNOSIS — R10.13 ABDOMINAL PAIN, EPIGASTRIC: ICD-10-CM

## 2021-05-10 DIAGNOSIS — R11.0 NAUSEA: ICD-10-CM

## 2021-05-10 DIAGNOSIS — E16.2 HYPOGLYCEMIA: ICD-10-CM

## 2021-05-10 DIAGNOSIS — L97.509 DIABETIC TOE ULCER (H): ICD-10-CM

## 2021-05-10 DIAGNOSIS — R10.9 RIGHT FLANK PAIN: ICD-10-CM

## 2021-05-10 DIAGNOSIS — E11.621 DIABETIC TOE ULCER (H): ICD-10-CM

## 2021-05-10 LAB
ALBUMIN SERPL-MCNC: 3.6 G/DL (ref 3.4–5)
ALBUMIN UR-MCNC: NEGATIVE MG/DL
ALP SERPL-CCNC: 54 U/L (ref 40–150)
ALT SERPL W P-5'-P-CCNC: 56 U/L (ref 0–70)
ANION GAP SERPL CALCULATED.3IONS-SCNC: 4 MMOL/L (ref 3–14)
APPEARANCE UR: CLEAR
AST SERPL W P-5'-P-CCNC: 38 U/L (ref 0–45)
BASOPHILS # BLD AUTO: 0.1 10E9/L (ref 0–0.2)
BASOPHILS NFR BLD AUTO: 0.9 %
BILIRUB DIRECT SERPL-MCNC: 0.2 MG/DL (ref 0–0.2)
BILIRUB SERPL-MCNC: 0.5 MG/DL (ref 0.2–1.3)
BILIRUB UR QL STRIP: NEGATIVE
BUN SERPL-MCNC: 15 MG/DL (ref 7–30)
CALCIUM SERPL-MCNC: 8.5 MG/DL (ref 8.5–10.1)
CHLORIDE SERPL-SCNC: 108 MMOL/L (ref 94–109)
CO2 SERPL-SCNC: 27 MMOL/L (ref 20–32)
COLOR UR AUTO: NORMAL
CREAT SERPL-MCNC: 0.86 MG/DL (ref 0.66–1.25)
DIFFERENTIAL METHOD BLD: ABNORMAL
EOSINOPHIL # BLD AUTO: 0.2 10E9/L (ref 0–0.7)
EOSINOPHIL NFR BLD AUTO: 3.4 %
ERYTHROCYTE [DISTWIDTH] IN BLOOD BY AUTOMATED COUNT: 13.3 % (ref 10–15)
GFR SERPL CREATININE-BSD FRML MDRD: >90 ML/MIN/{1.73_M2}
GLUCOSE BLDC GLUCOMTR-MCNC: 147 MG/DL (ref 70–99)
GLUCOSE BLDC GLUCOMTR-MCNC: 163 MG/DL (ref 70–99)
GLUCOSE BLDC GLUCOMTR-MCNC: 26 MG/DL (ref 70–99)
GLUCOSE SERPL-MCNC: 68 MG/DL (ref 70–99)
GLUCOSE UR STRIP-MCNC: NEGATIVE MG/DL
HCT VFR BLD AUTO: 37.8 % (ref 40–53)
HGB BLD-MCNC: 12.1 G/DL (ref 13.3–17.7)
HGB UR QL STRIP: NEGATIVE
IMM GRANULOCYTES # BLD: 0 10E9/L (ref 0–0.4)
IMM GRANULOCYTES NFR BLD: 0.2 %
KETONES UR STRIP-MCNC: NEGATIVE MG/DL
LEUKOCYTE ESTERASE UR QL STRIP: NEGATIVE
LIPASE SERPL-CCNC: 170 U/L (ref 73–393)
LYMPHOCYTES # BLD AUTO: 1.9 10E9/L (ref 0.8–5.3)
LYMPHOCYTES NFR BLD AUTO: 29.4 %
MCH RBC QN AUTO: 29.2 PG (ref 26.5–33)
MCHC RBC AUTO-ENTMCNC: 32 G/DL (ref 31.5–36.5)
MCV RBC AUTO: 91 FL (ref 78–100)
MONOCYTES # BLD AUTO: 0.5 10E9/L (ref 0–1.3)
MONOCYTES NFR BLD AUTO: 7.4 %
NEUTROPHILS # BLD AUTO: 3.8 10E9/L (ref 1.6–8.3)
NEUTROPHILS NFR BLD AUTO: 58.7 %
NITRATE UR QL: NEGATIVE
NRBC # BLD AUTO: 0 10*3/UL
NRBC BLD AUTO-RTO: 0 /100
PH UR STRIP: 7 PH (ref 5–7)
PLATELET # BLD AUTO: 156 10E9/L (ref 150–450)
POTASSIUM SERPL-SCNC: 3.6 MMOL/L (ref 3.4–5.3)
PROT SERPL-MCNC: 7.4 G/DL (ref 6.8–8.8)
RBC # BLD AUTO: 4.14 10E12/L (ref 4.4–5.9)
RBC #/AREA URNS AUTO: <1 /HPF (ref 0–2)
SODIUM SERPL-SCNC: 139 MMOL/L (ref 133–144)
SOURCE: NORMAL
SP GR UR STRIP: 1.01 (ref 1–1.03)
TROPONIN I SERPL-MCNC: <0.015 UG/L (ref 0–0.04)
UROBILINOGEN UR STRIP-MCNC: NORMAL MG/DL (ref 0–2)
WBC # BLD AUTO: 6.5 10E9/L (ref 4–11)
WBC #/AREA URNS AUTO: <1 /HPF (ref 0–5)

## 2021-05-10 PROCEDURE — 84484 ASSAY OF TROPONIN QUANT: CPT | Performed by: EMERGENCY MEDICINE

## 2021-05-10 PROCEDURE — 74176 CT ABD & PELVIS W/O CONTRAST: CPT

## 2021-05-10 PROCEDURE — 96374 THER/PROPH/DIAG INJ IV PUSH: CPT

## 2021-05-10 PROCEDURE — 93005 ELECTROCARDIOGRAM TRACING: CPT

## 2021-05-10 PROCEDURE — 80076 HEPATIC FUNCTION PANEL: CPT | Performed by: EMERGENCY MEDICINE

## 2021-05-10 PROCEDURE — 999N001017 HC STATISTIC GLUCOSE BY METER IP

## 2021-05-10 PROCEDURE — 73660 X-RAY EXAM OF TOE(S): CPT | Mod: RT

## 2021-05-10 PROCEDURE — 83690 ASSAY OF LIPASE: CPT | Performed by: EMERGENCY MEDICINE

## 2021-05-10 PROCEDURE — 85025 COMPLETE CBC W/AUTO DIFF WBC: CPT | Performed by: EMERGENCY MEDICINE

## 2021-05-10 PROCEDURE — 80048 BASIC METABOLIC PNL TOTAL CA: CPT | Performed by: EMERGENCY MEDICINE

## 2021-05-10 PROCEDURE — 99285 EMERGENCY DEPT VISIT HI MDM: CPT | Mod: 25

## 2021-05-10 PROCEDURE — 76705 ECHO EXAM OF ABDOMEN: CPT

## 2021-05-10 PROCEDURE — 81001 URINALYSIS AUTO W/SCOPE: CPT | Performed by: EMERGENCY MEDICINE

## 2021-05-10 PROCEDURE — 258N000001 HC RX 258: Performed by: PHYSICIAN ASSISTANT

## 2021-05-10 RX ORDER — DEXTROSE MONOHYDRATE 25 G/50ML
25 INJECTION, SOLUTION INTRAVENOUS ONCE
Status: COMPLETED | OUTPATIENT
Start: 2021-05-10 | End: 2021-05-10

## 2021-05-10 RX ADMIN — DEXTROSE MONOHYDRATE 50 ML: 25 INJECTION, SOLUTION INTRAVENOUS at 20:24

## 2021-05-10 ASSESSMENT — ENCOUNTER SYMPTOMS
SHORTNESS OF BREATH: 0
FEVER: 0
NAUSEA: 1
VOMITING: 0
FLANK PAIN: 1
DIARRHEA: 0
CHILLS: 0
WOUND: 1

## 2021-05-10 NOTE — ED PROVIDER NOTES
History   Chief Complaint:  Flank pain an toe concern     HPI   Crispin Rojas is a 61 year old male with history of Diabetes, Kidney stone and recent hospital visit 04/20/2021 for diabetic ulcer who presents with flank pain and toe concern. The patient states he was seen recently for a diabetic ulcer. He note he was supposed to be seen today by podiatry, but missed his appointment. The ulcer is on his right pointer toe. He was given antibiotics three weeks ago and completed these. He now notes this to be scabbed over. On Friday he states he began feeling upper epigastric pain which was a 9/10. This began around 1800 and lasted 3-4 hours. Since then he notes right sided flank pain on and off which lasts in episodes of seconds. With this pain he also notes feeling nauseous. He has not taken any medication. He denies fever, chest pain, shortness of breath, vomiting and diarrhea.     Review of Systems   Constitutional: Negative for chills and fever.   Respiratory: Negative for shortness of breath.    Cardiovascular: Negative for chest pain and leg swelling.   Gastrointestinal: Positive for nausea. Negative for diarrhea and vomiting.   Genitourinary: Positive for flank pain.   Skin: Positive for wound.   All other systems reviewed and are negative.      Allergies:  Bactrim  Niacin  Simvastatin    Medications:  Amlodipine  Lipitor  Gabapentin  Glucagon  Mirozide  Insulin  Nizoral  Lisinopril  Metformin  Oxempic  Cialis  Semaglutide    Past Medical History:    Cerebral Infarction  Chronic Infection  Heartburn  Hyperlipidemia  Hypertension  Numbness and Tingling  Obesity  GILA  Renal Stone  Type 2 Diabetes  Diabetic Ulcer of Lower Extremity  Thoracic Aortic Aneurysm without Rupture  RBBB  Chronic Left Shoulder Pain  Cervicalgia  Calculus of Kidney  Morbid Obesity    Past Surgical History:    Foot Amputation, left  Toe Amputation, bilateral feet  Angiogram  Retrogrades, left  Extracorporeal Shock Wave  Lithotripsy  Recession Gastrocnemius     Family History:    Mother - Arthritis, Lupus, Diabetes, CVD, Cancer  Father - Diabetes  Sister - Diabetes    Social History:  Recent hospital visit. Presents with self    Physical Exam     Patient Vitals for the past 24 hrs:   BP Temp Temp src Pulse Resp SpO2   05/10/21 2045 (!) 152/100 -- -- 53 -- 95 %   05/10/21 2000 (!) 143/70 -- -- 56 -- 96 %   05/10/21 1945 (!) 152/72 -- -- 56 -- 95 %   05/10/21 1930 (!) 149/75 -- -- 60 -- 91 %   05/10/21 1915 (!) 149/73 -- -- 64 -- 92 %   05/10/21 1910 (!) 163/76 -- -- 64 -- 95 %   05/10/21 1543 (!) 148/84 98  F (36.7  C) Oral 61 18 98 %       Physical Exam  Constitutional: Pleasant. Cooperative.   Eyes: Pupils equally round and reactive  HENT: Head is normal in appearance. Oropharynx is normal with moist mucus membranes.  Cardiovascular: Regular rate and rhythm and without murmurs.  Respiratory: Normal respiratory effort, lungs are clear bilaterally.  GI: Abdomen is soft, non-tender, non-distended. No guarding, rebound, or rigidity.  Musculoskeletal: No asymmetry of the lower extremities. Multiple absent digits noted to R foot.  Skin: Area of ulceration noted R foot 2nd toe. No surrounding erythema. No tenderness. Full ROM of remaining toes, ankle.  Neurologic: Cranial nerves grossly intact, normal cognition, no focal deficits. Alert and oriented x 3.   Psychiatric: Normal affect.  Nursing notes and vital signs reviewed.            Emergency Department Course     ECG (16:09:18):  Indication: Screening for cardiovascular disease.   Rate 60 bpm. PA interval 180. QRS duration 106. QT/QTc 426/426. P-R-T axes -27 -52 -22.   Interpretation: Normal sinus rhythm.Left anterior fascicular block. Inferior infarct, age undetermined  Interpreted at 1844 by Dr. Horn.     Imaging:  XR Toe right, 2 views:  Great toe amputation at the IP joint. Small erosion or cyst in the plantar aspect of the great toe proximal phalangeal head. This is unchanged,  thinly corticated, and therefore of uncertain significance. Fourth toe amputation at the proximal   interphalangeal joint.    Imaging independently reviewed and agree with radiologist interpretation.      CT Abd/pelvis without contrast:  1.  Cirrhosis.     2.  Gallbladder wall thickening with hyperdense sludge versus stones. Recommend clinical exclusion of acute cholecystitis. Gallbladder wall thickening can be seen with liver parenchymal disease and was seen on the prior exam.     3.  Several small nonobstructing stones within the left kidney. No ureteric stone or hydronephrosis.     4.  A few mildly prominent right external iliac and right inguinal lymph nodes likely reactive.     5.  Mild urinary bladder wall thickening is again seen. This could be due to outlet obstruction and is unchanged.     Imaging independently reviewed and agree with radiologist interpretation.     US Abdomen Limited, RUQ:  1.  Cholelithiasis. Negative sonographic Dallas sign.     Imaging independently reviewed and agree with radiologist interpretation.   Laboratory:  CBC: WBC 6.5, HGB 12.1 (L),      BMP: Glucose 68 (L) o/w WNL (Creatinine 0.86)     Troponin (Collected 1555): <0.015    UA with microscopic: AWNL    Hepatic Panel: AWNL    Lipase: 170    Glucose by meter (Collected 2023): 26 (LL)   Glucose by meter (Collected 2034): 147 (H)  Glucose by meter (Collected 2136): 163 (H)        Emergency Department Course:    Reviewed:  I reviewed nursing notes, vitals, past medical history and care everywhere    Assessments:  1708 I obtained history and examined the patient as noted above.   2150 I rechecked the patient and explained findings.     Interventions:  2024 Dextrose 50% 50 ml IV    Disposition:  The patient was discharged to home.     Impression & Plan     Medical Decision Making:  Crispin Rojas is a 61 year old male who presents to the ED for evaluation of flank pain as well as toe wound.  Patient seen recently for diabetic  toe ulcer about 3 weeks ago here in the ED.  He was placed on antibiotics at that time and completed the course, he notes really no change to the ulceration.  He was supposed to follow-up with podiatry today, however missed the appointment.  He also notes developing epigastric pain about 3 days ago which was fleeting and has not returned, however he notes that he has since had fleeting recurrent episodes of right-sided flank pain as well, prompting his presentation to the ED.  See HPI as above for additional details.  Vitals and physical exam as above. Toe appears mostly unchanged from previous. See picture of toe from that day for comparison.  X-ray obtained here without any evidence for bony involvement.  No marked erythema to suggest for infection at this time.  Advised patient needs to follow-up very closely with podiatry and he will call tomorrow.  With respect to flank and epigastric pain, remainder of work-up as above.  CT did show evidence for gallbladder wall thickening with concern for sludge versus stones.  This is likely unchanged however.  No evidence for right-sided kidney stones.  No evidence for shingles.  Hepatic panel and lipase are reassuring.  No evidence for cardiac involvement at this time.  Episodes of right flank/RUQ abdominal pain may be a product of biliary colic given presence of stones noted on ultrasound.  Discussed conservative management.  Will provide follow-up information for general surgery should these symptoms persist.  Kidney function reassuring.  Of note, patient sugar initially low at 68.  We were informed via ultrasound tech that while patient was there he was slurring his words and was behaviorally abnormal.  RN ran over and obtained sugar, which returned at 26.  D50 immediately provided as well as juice.  Sugar rebounded quickly and patient noted feeling markedly improved after the case.  After the fact, patient does note that he has been taking his medications but perhaps  has not been eating enough to compensate for his normal dose.  His sugar monitor also was supposed to be changed earlier today and he did not do so.  I advised him to do so ASAP.  He will continue to monitor sugars closely.  [place] patient was safe for discharge to home.  Close follow-up with PCP. Discussed reasons to return. All questions answered. Patient discharged to home in stable condition.    Diagnosis:    ICD-10-CM    1. Diabetic toe ulcer (H)  E11.621     L97.509    2. Abdominal pain, epigastric  R10.13    3. Right flank pain  R10.9    4. Nausea  R11.0    5. Hypoglycemia  E16.2        Scribe Disclosure:  I, Jasper Rodarte, am serving as a scribe at 5:08 PM on 5/10/2021 to document services personally performed by Aakash Gonzales PA-C  based on my observations and the provider's statements to me.     This record was created at least in part using electronic voice recognition software, so please excuse any typographical errors.       Aakash Gonzales PA-C  05/10/21 1690

## 2021-05-10 NOTE — ED TRIAGE NOTES
ABCs intact. Pt c/o R 2nd toe pain for about a month. Pt was on abx over a month ago and is about the same before abx. Pt c/o R flank pain started about a month ago as well. Pt c/o increased pain Friday. Pt hx kidney stones. C/o urinary frequency. Denies hematuria. C/o epigastric pain on Friday.

## 2021-05-11 LAB — INTERPRETATION ECG - MUSE: NORMAL

## 2021-05-11 NOTE — ED NOTES
"Ultrasound Tech reports \"pt is confused and slurring his speech\", upon arrival pt is clammy and confused. PT initial sugar 26, orange juice provided and D50, PT alert and oriented after administration. PT provided with lunch and drinks   "

## 2021-05-11 NOTE — TELEPHONE ENCOUNTER
Pt passing for eye exam per quality    Shania Warren/[facility]  [place]

## 2021-05-11 NOTE — DISCHARGE INSTRUCTIONS
This may represent biliary colic. See attached information sheet. Use ibuprofen for pain treatment. Follow up with general surgery. Referral provided.    Monitor sugars very closely.

## 2021-06-08 DIAGNOSIS — I10 BENIGN ESSENTIAL HYPERTENSION: ICD-10-CM

## 2021-06-08 RX ORDER — HYDROCHLOROTHIAZIDE 12.5 MG/1
12.5 CAPSULE ORAL EVERY MORNING
Qty: 90 CAPSULE | Refills: 1 | Status: SHIPPED | OUTPATIENT
Start: 2021-06-08 | End: 2022-01-17

## 2021-06-16 NOTE — TELEPHONE ENCOUNTER
[facility] Team    Previous patient of Jasmin Arteaga. Seen for DM. Wondering if we can order labs for him to complete 1 week prior to his appt with in June..    Please send lab orders to: [facility]    Notified to do labs 1 week prior to his 06/29 appt.    Any issues, he can be reached @ [phone]

## 2021-06-22 DIAGNOSIS — Z79.4 TYPE 2 DIABETES MELLITUS WITH DIABETIC POLYNEUROPATHY, WITH LONG-TERM CURRENT USE OF INSULIN (H): ICD-10-CM

## 2021-06-22 DIAGNOSIS — E78.5 HYPERLIPIDEMIA LDL GOAL <70: ICD-10-CM

## 2021-06-22 DIAGNOSIS — E11.9 DIABETES MELLITUS (H): ICD-10-CM

## 2021-06-22 DIAGNOSIS — I77.810 MILD ASCENDING AORTA DILATATION (H): ICD-10-CM

## 2021-06-22 DIAGNOSIS — I10 BENIGN ESSENTIAL HYPERTENSION: ICD-10-CM

## 2021-06-22 DIAGNOSIS — G47.33 OSA (OBSTRUCTIVE SLEEP APNEA): ICD-10-CM

## 2021-06-22 DIAGNOSIS — Z78.9 INTOLERANCE OF CONTINUOUS POSITIVE AIRWAY PRESSURE (CPAP) VENTILATION: ICD-10-CM

## 2021-06-22 DIAGNOSIS — I45.10 RIGHT BUNDLE BRANCH BLOCK: ICD-10-CM

## 2021-06-22 DIAGNOSIS — E11.42 TYPE 2 DIABETES MELLITUS WITH DIABETIC POLYNEUROPATHY, WITH LONG-TERM CURRENT USE OF INSULIN (H): ICD-10-CM

## 2021-06-22 DIAGNOSIS — Z86.73 HISTORY OF STROKE: ICD-10-CM

## 2021-06-22 LAB
CHOLEST SERPL-MCNC: 95 MG/DL
HBA1C MFR BLD: 6.5 % (ref 0–5.6)
HDLC SERPL-MCNC: 42 MG/DL
LDLC SERPL CALC-MCNC: 45 MG/DL
NONHDLC SERPL-MCNC: 53 MG/DL
TRIGL SERPL-MCNC: 42 MG/DL

## 2021-06-22 PROCEDURE — 36415 COLL VENOUS BLD VENIPUNCTURE: CPT | Performed by: NURSE PRACTITIONER

## 2021-06-22 PROCEDURE — 83036 HEMOGLOBIN GLYCOSYLATED A1C: CPT | Performed by: NURSE PRACTITIONER

## 2021-06-22 PROCEDURE — 80061 LIPID PANEL: CPT | Performed by: NURSE PRACTITIONER

## 2021-07-09 DIAGNOSIS — E11.49 DIABETES MELLITUS TYPE 2 WITH NEUROLOGICAL MANIFESTATIONS (H): ICD-10-CM

## 2021-07-09 DIAGNOSIS — E78.5 HYPERLIPIDEMIA LDL GOAL <100: ICD-10-CM

## 2021-07-09 RX ORDER — ATORVASTATIN CALCIUM 40 MG/1
TABLET, FILM COATED ORAL
Qty: 90 TABLET | Refills: 0 | Status: SHIPPED | OUTPATIENT
Start: 2021-07-09 | End: 2021-11-10

## 2021-07-09 NOTE — TELEPHONE ENCOUNTER
Routing refill request to provider for review/approval because:  Drug interaction warning - Allergy  Patient needs to be seen because it has been more than 1 year since last office visit.    Yola Zelaya, JOSEN, RN  [facility]

## 2021-07-22 ENCOUNTER — OFFICE VISIT (OUTPATIENT)
Dept: SURGERY | Facility: CLINIC | Age: 61
End: 2021-07-22
Payer: COMMERCIAL

## 2021-07-22 VITALS
RESPIRATION RATE: 16 BRPM | OXYGEN SATURATION: 99 % | DIASTOLIC BLOOD PRESSURE: 68 MMHG | BODY MASS INDEX: 32.51 KG/M2 | WEIGHT: 240 LBS | HEIGHT: 72 IN | SYSTOLIC BLOOD PRESSURE: 120 MMHG | HEART RATE: 66 BPM

## 2021-07-22 DIAGNOSIS — R10.13 ABDOMINAL PAIN, EPIGASTRIC: ICD-10-CM

## 2021-07-22 DIAGNOSIS — K80.20 CALCULUS OF GALLBLADDER WITHOUT CHOLECYSTITIS WITHOUT OBSTRUCTION: Primary | ICD-10-CM

## 2021-07-22 DIAGNOSIS — K74.60 CIRRHOSIS OF LIVER WITHOUT ASCITES, UNSPECIFIED HEPATIC CIRRHOSIS TYPE (H): ICD-10-CM

## 2021-07-22 PROCEDURE — 99204 OFFICE O/P NEW MOD 45 MIN: CPT | Performed by: SURGERY

## 2021-07-22 ASSESSMENT — MIFFLIN-ST. JEOR: SCORE: 1933.69

## 2021-07-22 NOTE — LETTER
2021       Re: Crispin Rojas - 1960    Assessment and Plan:  It is my impression that Crispin may have symptomatic gallstones. However, his pattern of pain is somewhat atypical of biliary colic in that is is mild and long lasting throughout the day rather than in discreet episodes. He has unexplained vomiting with significant frequency which always occurs in the AM. He has evidence of cirrhosis on his abdominal CT which has not been evaluated by GI. As such, I recommend that he see a gastroenterologist prior to undergoing laparoscopic cholecystectomy. If they would like a liver biopsy at the time of his cholecystectomy, this could be performed.      We have discussed the indication, alternatives, risks and expected recovery.  Specifically we have discussed incisions, scarring, postoperative infections, anesthesia, bleeding, blood transfusion, open conversion, common bile duct injury, injury to intra-abdominal organs, adhesions that can lead to bowel obstruction, retained common bile duct stone, bile leak, DVT, PE, hernia, post cholecystectomy diarrhea, postoperative dietary restrictions and physical limitations.  We have discussed the recommended interventions and treatments for these complications.  All questions have been answered to the best of my ability.          We will schedule surgery once his GI workup has been completed. He will need a pre-op H&P by his PCP prior to surgery.      Chief complaint:  Abdominal pain, right upper quadrant     HPI:  Crispin Rojas is a 61 year old male who presents with intermittent epigastric pain for the past 2 years.  The pain is not associated with eating any type of food.  It is increasing in frequency and now often occurs a few times per week and lasts nearly all day.  Positive for associated symptoms of anorexia, vomiting and bloating. He has actually had frequent vomiting over the past ~3 months, occurring multiple times per week and in the morning.  "He denies recent significant alcohol use, but says he did use to \"binge drink\" years ago. The epigastric pain is sometimes more right sided than left, but usually in the center of his abdomen. He presented to the ED for this ~ 2 months ago and ultrasound showed small gallstones sludge without wall thickening on ultrasound. He also had CT evidence of cirrhosis. He does not have a history of jaundice or dark urine.  He  has not had pancreatitis in the past. No hematemesis.      Past Medical History:  Cerebral infarction (H), Chronic infection, History of heartburn, Hyperlipidemia LDL goal <70, Hypertension, Numbness and tingling, Obesity, GILA (obstructive sleep apnea) (10/22/2018), Renal stone, and Type II or unspecified type diabetes mellitus without mention of complication, not stated as uncontrolled.     No FH bleeding or clotting problems or reactions to anesthesia     Review of Systems:  The 10 point review of systems is negative other than noted in the HPI and above.     Physical Exam:  Vitals: /68   Pulse 66   Resp 16   Ht 1.816 m (5' 11.5\")   Wt 108.9 kg (240 lb)   SpO2 99%   BMI 33.01 kg/m    BMI= Body mass index is 33.01 kg/m .  General - Well developed, well nourished male in no apparent distress  HEENT:  Pupils equal and round, conjunctivae clear, no scleral icterus, mucous membranes moist, external ears and nose normal  Pulmonary: Breathing is unlabored on room air  CV: Regular pulse  Abdomen: soft, flat, non-distended with no tenderness noted in the epigastric region. no masses palpated.  Musculoskeletal:  Moves all extremities equally, arm without edema  Neurologic: alert, speech is clear, nonfocal  Psychiatric: Mood and affect appropriate  Skin: Without lesions, rashes or jaundice     Relevant labs:     WBC -         Lab Results   Component Value Date     WBC 6.5 05/10/2021         HgB -         Lab Results   Component Value Date     HGB 12.1 (L) 05/10/2021         Plt-         Lab Results "   Component Value Date      05/10/2021         Liver Function Studies -       Recent Labs   Lab Test 05/10/21  1555   PROTTOTAL 7.4   ALBUMIN 3.6   BILITOTAL 0.5   ALKPHOS 54   AST 38   ALT 56         Lipase-         Lab Results   Component Value Date     LIPASE 170 05/10/2021            Imaging:  All imaging studies reviewed by me.     Ultrasound shows: positive cholelithiasis, negative gallbladder wall thickening, negative ductal dilatation, negative pericholecystic fluid, negative sonographic Dallas's sign.     No results found for this or any previous visit (from the past 744 hour(s)).        This note was created using voice recognition software. Undetected word substitutions or other errors may have occurred.      45 minutes total time spent on the date of this encounter doing: chart review, review of test results, patient visit, physical exam, education, counseling, developing plan of care, and documenting.        Marilia Burgess MD  [facility] [place]

## 2021-07-22 NOTE — PROGRESS NOTES
[facility]  New Patient Office Visit    Crispin Rojas is a 61 year old male seen in consultation for biliary colic.     Assessment and Plan:  It is my impression that Crispin may have symptomatic gallstones. However, his pattern of pain is somewhat atypical of biliary colic in that is is mild and long lasting throughout the day rather than in discreet episodes. He has unexplained vomiting with significant frequency which always occurs in the AM. He has evidence of cirrhosis on his abdominal CT which has not been evaluated by GI. As such, I recommend that he see a gastroenterologist prior to undergoing laparoscopic cholecystectomy. If they would like a liver biopsy at the time of his cholecystectomy, this could be performed.     We have discussed the indication, alternatives, risks and expected recovery.  Specifically we have discussed incisions, scarring, postoperative infections, anesthesia, bleeding, blood transfusion, open conversion, common bile duct injury, injury to intra-abdominal organs, adhesions that can lead to bowel obstruction, retained common bile duct stone, bile leak, DVT, PE, hernia, post cholecystectomy diarrhea, postoperative dietary restrictions and physical limitations.  We have discussed the recommended interventions and treatments for these complications.  All questions have been answered to the best of my ability.         We will schedule surgery once his GI workup has been completed. He will need a pre-op H&P by his PCP prior to surgery.     Chief complaint:  Abdominal pain, right upper quadrant    HPI:  Crispin Rojas is a 61 year old male who presents with intermittent epigastric pain for the past 2 years.  The pain is not associated with eating any type of food.  It is increasing in frequency and now often occurs a few times per week and lasts nearly all day.  Positive for associated symptoms of anorexia, vomiting and bloating. He has actually had frequent vomiting over the  "past ~3 months, occurring multiple times per week and in the morning. He denies recent significant alcohol use, but says he did use to \"binge drink\" years ago. The epigastric pain is sometimes more right sided than left, but usually in the center of his abdomen. He presented to the ED for this ~ 2 months ago and ultrasound showed small gallstones sludge without wall thickening on ultrasound. He also had CT evidence of cirrhosis. He does not have a history of jaundice or dark urine.  He  has not had pancreatitis in the past. No hematemesis.     Past Medical History:  Cerebral infarction (H), Chronic infection, History of heartburn, Hyperlipidemia LDL goal <70, Hypertension, Numbness and tingling, Obesity, GILA (obstructive sleep apnea) (10/22/2018), Renal stone, and Type II or unspecified type diabetes mellitus without mention of complication, not stated as uncontrolled.    Past Surgical History:  Past Surgical History:   Procedure Laterality Date     AMPUTATE FOOT Left 8/18/2016    Procedure: AMPUTATE FOOT;  Surgeon: Jack Younger DPM;  Location: [facility] OR     AMPUTATE TOE(S) Right 2/1/2016    Procedure: AMPUTATE TOE(S);  Surgeon: Rachelle Manriquez DPM, Pod;  Location: [facility] OR     AMPUTATE TOE(S) Right 8/2/2019    Procedure: Right fourth toe partial amputation for treatment of osteomyelitis.;  Surgeon: Jack Younger DPM;  Location: [facility] OR     AMPUTATE TOE(S) Left 11/8/2019    Procedure: Left second toe amputation at the metatarsophalangeal joint.;  Surgeon: Rachelle Manriquez DPM, Podiatry/Foot and Ankle Surgery;  Location: [facility] OR     ANGIOGRAM       COLONOSCOPY       COMBINED CYSTOSCOPY, RETROGRADES, URETEROSCOPY, INSERT STENT Left 8/21/2016    Procedure: COMBINED CYSTOSCOPY, RETROGRADES, URETEROSCOPY, INSERT STENT;  Surgeon: Artemio Valenzuela MD;  Location: [facility] OR     COMBINED CYSTOSCOPY, RETROGRADES, URETEROSCOPY, LASER HOLMIUM LITHOTRIPSY URETER(S), INSERT STENT Left 10/3/2016    Procedure: COMBINED " "CYSTOSCOPY, RETROGRADES, URETEROSCOPY, LASER HOLMIUM LITHOTRIPSY URETER(S), INSERT STENT;  Surgeon: Artemio Valenzuela MD;  Location: [facility] OR     EXTRACORPOREAL SHOCK WAVE LITHOTRIPSY (ESWL) Left 9/8/2016    Procedure: EXTRACORPOREAL SHOCK WAVE LITHOTRIPSY (ESWL);  Surgeon: Artemio Valenzuela MD;  Location: [facility] OR     RECESSION GASTROCNEMIUS Right 2/1/2016    Procedure: RECESSION GASTROCNEMIUS;  Surgeon: Rachelle Manriquez, DPM, Pod;  Location: [facility] OR       Social History:  Social History     Tobacco Use     Smoking status: Never Smoker     Smokeless tobacco: Never Used   Substance Use Topics     Alcohol use: Yes     Alcohol/week: 0.0 standard drinks     Comment: rare---red wine 2x per month     Drug use: No        Family History:  Family History   Problem Relation Age of Onset     Arthritis Mother         SLE     Connective Tissue Disorder Mother         lupus     Diabetes Mother      Cerebrovascular Disease Mother      Cancer Mother      Diabetes Father      Diabetes Maternal Grandfather      Diabetes Sister      No FH bleeding or clotting problems or reactions to anesthesia    Review of Systems:  The 10 point review of systems is negative other than noted in the HPI and above.    Physical Exam:  Vitals: /68   Pulse 66   Resp 16   Ht 1.816 m (5' 11.5\")   Wt 108.9 kg (240 lb)   SpO2 99%   BMI 33.01 kg/m    BMI= Body mass index is 33.01 kg/m .  General - Well developed, well nourished male in no apparent distress  HEENT:  Pupils equal and round, conjunctivae clear, no scleral icterus, mucous membranes moist, external ears and nose normal  Pulmonary: Breathing is unlabored on room air  CV: Regular pulse  Abdomen: soft, flat, non-distended with no tenderness noted in the epigastric region. no masses palpated.  Musculoskeletal:  Moves all extremities equally, arm without edema  Neurologic: alert, speech is clear, nonfocal  Psychiatric: Mood and affect appropriate  Skin: Without lesions, rashes or " jaundice    Relevant labs:    WBC -   Lab Results   Component Value Date    WBC 6.5 05/10/2021       HgB -   Lab Results   Component Value Date    HGB 12.1 (L) 05/10/2021       Plt-   Lab Results   Component Value Date     05/10/2021       Liver Function Studies -   Recent Labs   Lab Test 05/10/21  1555   PROTTOTAL 7.4   ALBUMIN 3.6   BILITOTAL 0.5   ALKPHOS 54   AST 38   ALT 56       Lipase-   Lab Results   Component Value Date    LIPASE 170 05/10/2021         Imaging:  All imaging studies reviewed by me.    Ultrasound shows: positive cholelithiasis, negative gallbladder wall thickening, negative ductal dilatation, negative pericholecystic fluid, negative sonographic Dallas's sign.    No results found for this or any previous visit (from the past 744 hour(s)).      This note was created using voice recognition software. Undetected word substitutions or other errors may have occurred.     45 minutes total time spent on the date of this encounter doing: chart review, review of test results, patient visit, physical exam, education, counseling, developing plan of care, and documenting.      Marilia Burgess MD  [facility] [place]    Please route or send letter to:  Primary Care Provider (PCP)

## 2021-08-10 ENCOUNTER — NURSE TRIAGE (OUTPATIENT)
Dept: NURSING | Facility: CLINIC | Age: 61
End: 2021-08-10

## 2021-08-10 DIAGNOSIS — Z79.4 TYPE 2 DIABETES MELLITUS WITH DIABETIC PERIPHERAL ANGIOPATHY AND GANGRENE, WITH LONG-TERM CURRENT USE OF INSULIN (H): ICD-10-CM

## 2021-08-10 DIAGNOSIS — E11.52 TYPE 2 DIABETES MELLITUS WITH DIABETIC PERIPHERAL ANGIOPATHY AND GANGRENE, WITH LONG-TERM CURRENT USE OF INSULIN (H): ICD-10-CM

## 2021-08-10 DIAGNOSIS — E11.49 DIABETES MELLITUS TYPE 2 WITH NEUROLOGICAL MANIFESTATIONS (H): ICD-10-CM

## 2021-08-10 RX ORDER — SEMAGLUTIDE 1.34 MG/ML
1 INJECTION, SOLUTION SUBCUTANEOUS
Qty: 6 ML | Refills: 5 | Status: SHIPPED | OUTPATIENT
Start: 2021-08-10 | End: 2022-07-06

## 2021-08-10 NOTE — TELEPHONE ENCOUNTER
Called pt to inform refills have been completed, although transmitted to [facility] in [place], [place].  Explained, nevertheless, that these meds can still be dispensed at pt's local [facility].  Left this info on pt's voicemail.  No further action required for this encounter.

## 2021-08-10 NOTE — TELEPHONE ENCOUNTER
Pt calls to expedite refills.  Leaves [facility] tomorrow afternoon.  Needs refills on:  - Ozempic  - Novolog    Asks for expedited refills please due to leaving town tomorrow.    Pharmacy verified -> [facility] [place].    Thank you-    Cherrie MORGAN Health Nurse Advisor     Reason for Disposition    Request for URGENT new prescription or refill of 'essential' medication (i.e., likelihood of harm to patient if not taken) and triager unable to fill per department policy    Protocols used: MEDICATION QUESTION CALL-A-[place]

## 2021-08-12 DIAGNOSIS — E11.52 TYPE 2 DIABETES MELLITUS WITH DIABETIC PERIPHERAL ANGIOPATHY AND GANGRENE, WITH LONG-TERM CURRENT USE OF INSULIN (H): ICD-10-CM

## 2021-08-12 DIAGNOSIS — Z79.4 TYPE 2 DIABETES MELLITUS WITH DIABETIC PERIPHERAL ANGIOPATHY AND GANGRENE, WITH LONG-TERM CURRENT USE OF INSULIN (H): ICD-10-CM

## 2021-08-16 RX ORDER — SEMAGLUTIDE 1.34 MG/ML
INJECTION, SOLUTION SUBCUTANEOUS
Qty: 3 ML | Refills: 5 | OUTPATIENT
Start: 2021-08-16

## 2021-08-16 NOTE — TELEPHONE ENCOUNTER
Prescription refilled on 8/16/21. Refusing refill request and closing encounter.     Maria C Evans RN on 8/16/2021 at 11:36 AM

## 2021-08-17 ENCOUNTER — OFFICE VISIT (OUTPATIENT)
Dept: PODIATRY | Facility: CLINIC | Age: 61
End: 2021-08-17
Payer: COMMERCIAL

## 2021-08-17 VITALS
SYSTOLIC BLOOD PRESSURE: 136 MMHG | HEIGHT: 72 IN | BODY MASS INDEX: 31.97 KG/M2 | WEIGHT: 236 LBS | DIASTOLIC BLOOD PRESSURE: 70 MMHG

## 2021-08-17 DIAGNOSIS — E11.42 TYPE 2 DIABETES MELLITUS WITH PERIPHERAL NEUROPATHY (H): ICD-10-CM

## 2021-08-17 DIAGNOSIS — L97.511 ULCER OF TOE, RIGHT, LIMITED TO BREAKDOWN OF SKIN (H): Primary | ICD-10-CM

## 2021-08-17 DIAGNOSIS — L84 CALLUS OF FOOT: ICD-10-CM

## 2021-08-17 DIAGNOSIS — L03.031 CELLULITIS OF TOE OF RIGHT FOOT: ICD-10-CM

## 2021-08-17 PROCEDURE — 11055 PARING/CUTG B9 HYPRKER LES 1: CPT | Mod: 51 | Performed by: PODIATRIST

## 2021-08-17 PROCEDURE — 97597 DBRDMT OPN WND 1ST 20 CM/<: CPT | Mod: 59 | Performed by: PODIATRIST

## 2021-08-17 PROCEDURE — 99214 OFFICE O/P EST MOD 30 MIN: CPT | Mod: 25 | Performed by: PODIATRIST

## 2021-08-17 RX ORDER — SILVER SULFADIAZINE 10 MG/G
CREAM TOPICAL DAILY
Qty: 50 G | Refills: 1 | Status: SHIPPED | OUTPATIENT
Start: 2021-08-17 | End: 2022-06-22

## 2021-08-17 RX ORDER — SULFAMETHOXAZOLE/TRIMETHOPRIM 800-160 MG
1 TABLET ORAL 2 TIMES DAILY
Qty: 20 TABLET | Refills: 0 | Status: SHIPPED | OUTPATIENT
Start: 2021-08-17 | End: 2022-01-03

## 2021-08-17 ASSESSMENT — MIFFLIN-ST. JEOR: SCORE: 1915.55

## 2021-08-17 NOTE — LETTER
8/17/2021         RE: Crispin Rojas  [id number]  [facility] [address]        Dear Colleague,    Thank you for referring your patient, Crispin Rojas, to the [facility]. Please see a copy of my visit note below.    ASSESSMENT:  Encounter Diagnoses   Name Primary?     Ulcer of toe, right, limited to breakdown of skin (H) Yes     Type 2 diabetes mellitus with peripheral neuropathy (H)      Cellulitis of toe of right foot      Callus of foot      MEDICAL DECISION MAKING:  Given the light erythema around these wounds, a localized cellulitis is considered.  He is at risk for ascending infection and has lost toes in the past due to infection.  Therefore, given his history, an oral antibiotic is indicated.  I prescribed Bactrim DS.    (L97.511) Ulcer of toe, right, limited to breakdown of skin (H)  (primary encounter diagnosis)  (L03.031) Cellulitis of toe of right foot  Plan: silver sulfADIAZINE (SILVADENE) 1 % external         cream, sulfamethoxazole-trimethoprim (BACTRIM         DS) 800-160 MG tablet    We discussed daily wound cares.  He is to continue cleansing the areas, blotting dry, applying Silvadene cream which was prescribed and a light dressing.    I also recommend he use his short cam walker for protection and to keep pressure off of the involved areas.    Using a tissue nippers, debridement of the right hallux ulceration was performed.  This consisted of debridement of desquamated skin.  Debridement was no deeper than the skin level and involved an area of less than 20 cm .    Using a #15 scalpel, a thick hyperkeratotic lesion on the plantar medial left forefoot was trimmed, pared down.  No bleeding.    We discussed the clinical signs of infection and what he was should watch for.  I have asked him to follow-up in 3 weeks.    Disclaimer: This note consists of symbols derived from keyboarding, dictation and/or voice recognition software. As a result, there may be  "errors in the script that have gone undetected. Please consider this when interpreting information found in this chart.    Valentino Molina, SHRUTHI, FACGAEL, MS    [facility]/Foot & Ankle Surgery      ____________________________________________________________________    HPI:       Pat presents today with new ulcerations on the right great toe and right second toe.  He described the skin on these toes is \"rubbed raw.\"  He apparently had a golf ball in his shoe and did not know it.  He has profound peripheral neuropathy associated with type 2 diabetes.  Is kept the wound clean and applied a dressing.  He reports using a disinfectant spray and Santyl ointment.  He has a \"jabbing\" discomfort.    Type 2 DM  Peripheral neuropathy  Last Hgb A1C = 6.5  History of digital amputation to treat osteomyelitis..    Past Medical History:   Diagnosis Date     Cerebral infarction (H)      Chronic infection      History of heartburn      Hyperlipidemia LDL goal <70      Hypertension      Numbness and tingling      Obesity      GILA (obstructive sleep apnea) 10/22/2018     Renal stone      Type II or unspecified type diabetes mellitus without mention of complication, not stated as uncontrolled    *  *  Past Surgical History:   Procedure Laterality Date     AMPUTATE FOOT Left 8/18/2016    Procedure: AMPUTATE FOOT;  Surgeon: Jack Younger DPM;  Location: [facility]     AMPUTATE TOE(S) Right 2/1/2016    Procedure: AMPUTATE TOE(S);  Surgeon: Rachelle Manriquez DPM, Pod;  Location: [facility]     AMPUTATE TOE(S) Right 8/2/2019    Procedure: Right fourth toe partial amputation for treatment of osteomyelitis.;  Surgeon: Jack Younger DPM;  Location: [facility]     AMPUTATE TOE(S) Left 11/8/2019    Procedure: Left second toe amputation at the metatarsophalangeal joint.;  Surgeon: Rachelle Manriquez DPM, Podiatry/Foot and Ankle Surgery;  Location: [facility] OR     ANGIOGRAM       COLONOSCOPY       COMBINED CYSTOSCOPY, RETROGRADES, " URETEROSCOPY, INSERT STENT Left 8/21/2016    Procedure: COMBINED CYSTOSCOPY, RETROGRADES, URETEROSCOPY, INSERT STENT;  Surgeon: Artemio Valenzuela MD;  Location: [facility] OR     COMBINED CYSTOSCOPY, RETROGRADES, URETEROSCOPY, LASER HOLMIUM LITHOTRIPSY URETER(S), INSERT STENT Left 10/3/2016    Procedure: COMBINED CYSTOSCOPY, RETROGRADES, URETEROSCOPY, LASER HOLMIUM LITHOTRIPSY URETER(S), INSERT STENT;  Surgeon: Artemio Valenzuela MD;  Location: [facility]     EXTRACORPOREAL SHOCK WAVE LITHOTRIPSY (ESWL) Left 9/8/2016    Procedure: EXTRACORPOREAL SHOCK WAVE LITHOTRIPSY (ESWL);  Surgeon: Artemio Valenzuela MD;  Location: [facility] OR     RECESSION GASTROCNEMIUS Right 2/1/2016    Procedure: RECESSION GASTROCNEMIUS;  Surgeon: Rachelle Manriquez, DPM, Pod;  Location: [facility] OR   *  *  Current Outpatient Medications   Medication Sig Dispense Refill     amLODIPine (NORVASC) 5 MG tablet TAKE 1 TABLET(5 MG) BY MOUTH TWICE DAILY 180 tablet 1     aspirin (ASA) 325 MG tablet Take 325 mg by mouth daily       atorvastatin (LIPITOR) 40 MG tablet TAKE 1 TABLET BY MOUTH EVERY DAY 90 tablet 0     blood glucose (NO BRAND SPECIFIED) lancets standard Use to test blood sugar 3 times daily or as directed. 300 each 3     blood glucose monitoring (NO BRAND SPECIFIED) meter device kit Use to test blood sugar 3 times daily or as directed. 1 kit 0     blood glucose monitoring (NO BRAND SPECIFIED) test strip Use to test blood sugars 3 times daily or as directed 300 strip 3     Cholecalciferol (VITAMIN D3 PO) Take 1,000 Units by mouth daily        Co-Enzyme Q10 100 MG CAPS Take 100 mg by mouth daily        Continuous Blood Gluc Sensor (FREESTYLE LUCERO 2 SENSOR SYSTM) MISC 1 each every 14 days 6 each 3     gabapentin (NEURONTIN) 100 MG capsule Take 300 mg by mouth 3 times daily       Glucagon, rDNA, (GLUCAGON EMERGENCY) 1 MG KIT Inject 1 mg into the muscle once for 1 dose 1 kit 0     hydrochlorothiazide (MICROZIDE) 12.5 MG capsule Take 1 capsule (12.5 mg) by  mouth every morning 90 capsule 1     insulin aspart (NOVOLOG PEN) 100 UNIT/ML pen 1 units per 9 grams of carbohydrate before each meal + 1:25 over 150 correction.  TDD 70 units 15 mL 3     insulin degludec (TRESIBA FLEXTOUCH) 100 UNIT/ML pen 45-55 units subcutaneous daily 60 mL 11     Insulin Pen Needle (PEN NEEDLES) 31G X 5 MM MISC 1 Device 4 times daily 200 each 10     ketoconazole (NIZORAL) 2 % external shampoo APPLY EXTERNALLY 2 TIMES A WEEK AS DIRECTED 120 mL 1     lisinopril (ZESTRIL) 40 MG tablet Take 1 tablet (40 mg) by mouth daily 90 tablet 3     MAGNESIUM GLYCINATE PLUS PO Take 400 mg by mouth 2 times daily       metFORMIN (GLUCOPHAGE-XR) 500 MG 24 hr tablet TAKE 2 TABLETS BY MOUTH TWICE DAILY WITH MEALS 360 tablet 3     Multiple Vitamins-Minerals (MULTIVITAMIN PO) Take 1 tablet by mouth daily        Omega-3 Fatty Acids (OMEGA-3 FISH OIL PO) Take 1 g by mouth 2 times daily (with meals)        potassium chloride ER (KLOR-CON M) 10 MEQ CR tablet Take 1 tablet (10 mEq) by mouth 2 times daily 5 tablet 3     semaglutide (OZEMPIC, 1 MG/DOSE,) 2 MG/1.5ML pen Inject 1 mg Subcutaneous every 7 days , on Wednesdays 6 mL 5     Semaglutide, 1 MG/DOSE, 4 MG/3ML SOPN Inject 1 mg Subcutaneous every 7 days on Wednesdays 3 mL 5     tadalafil (CIALIS) 5 MG tablet TAKE 1 TABLET BY MOUTH EVERY DAY AS NEEDED one hour before intercourse 30 tablet 1         EXAM:    Vitals: There were no vitals taken for this visit.  BMI: There is no height or weight on file to calculate BMI.    Constitutional:  Crispin Rojas is in no apparent distress, appears well-nourished.  Cooperative with history and physical exam.    Vascular:  Pedal pulses are palpable for both the DP and PT arteries.  CFT < 3 sec.  No edema.      Neuro: Light touch sensation is grossly diminished, bilateral foot, to the L4, L5, S1 distributions  No evidence of weakness, spasticity, or contracture in the lower extremities.     Derm:   1) Right hallux: ulceration to  deeper skin, distal lateral. 2cm diameter. Light surrounding erythema.   2) Right  2nd toe: ulceration dorsally. 2.5cm L x 0.5cm W with depth to deeper skin. Light surrounding erythema.                Again, thank you for allowing me to participate in the care of your patient.        Sincerely,        Valentino Molina DPM

## 2021-08-17 NOTE — PROGRESS NOTES
ASSESSMENT:  Encounter Diagnoses   Name Primary?     Ulcer of toe, right, limited to breakdown of skin (H) Yes     Type 2 diabetes mellitus with peripheral neuropathy (H)      Cellulitis of toe of right foot      Callus of foot      MEDICAL DECISION MAKING:  Given the light erythema around these wounds, a localized cellulitis is considered.  He is at risk for ascending infection and has lost toes in the past due to infection.  Therefore, given his history, an oral antibiotic is indicated.  I prescribed Bactrim DS.    (L97.511) Ulcer of toe, right, limited to breakdown of skin (H)  (primary encounter diagnosis)  (L03.031) Cellulitis of toe of right foot  Plan: silver sulfADIAZINE (SILVADENE) 1 % external         cream, sulfamethoxazole-trimethoprim (BACTRIM         DS) 800-160 MG tablet    We discussed daily wound cares.  He is to continue cleansing the areas, blotting dry, applying Silvadene cream which was prescribed and a light dressing.    I also recommend he use his short cam walker for protection and to keep pressure off of the involved areas.    Using a tissue nippers, debridement of the right hallux ulceration was performed.  This consisted of debridement of desquamated skin.  Debridement was no deeper than the skin level and involved an area of less than 20 cm .    Using a #15 scalpel, a thick hyperkeratotic lesion on the plantar medial left forefoot was trimmed, pared down.  No bleeding.    We discussed the clinical signs of infection and what he was should watch for.  I have asked him to follow-up in 3 weeks.    Disclaimer: This note consists of symbols derived from keyboarding, dictation and/or voice recognition software. As a result, there may be errors in the script that have gone undetected. Please consider this when interpreting information found in this chart.    Valentino Molina, SHRUTHI, FACFAS, MS    [facility] Pod[facility] Ankle  "Surgery      ____________________________________________________________________    HPI:       Pat presents today with new ulcerations on the right great toe and right second toe.  He described the skin on these toes is \"rubbed raw.\"  He apparently had a golf ball in his shoe and did not know it.  He has profound peripheral neuropathy associated with type 2 diabetes.  Is kept the wound clean and applied a dressing.  He reports using a disinfectant spray and Santyl ointment.  He has a \"jabbing\" discomfort.    Type 2 DM  Peripheral neuropathy  Last Hgb A1C = 6.5  History of digital amputation to treat osteomyelitis..    Past Medical History:   Diagnosis Date     Cerebral infarction (H)      Chronic infection      History of heartburn      Hyperlipidemia LDL goal <70      Hypertension      Numbness and tingling      Obesity      GILA (obstructive sleep apnea) 10/22/2018     Renal stone      Type II or unspecified type diabetes mellitus without mention of complication, not stated as uncontrolled    *  *  Past Surgical History:   Procedure Laterality Date     AMPUTATE FOOT Left 8/18/2016    Procedure: AMPUTATE FOOT;  Surgeon: Jack Younger DPM;  [facility]: [facility]     AMPUTATE TOE(S) Right 2/1/2016    Procedure: AMPUTATE TOE(S);  Surgeon: Rachelle Manriquez DPM, Pod;  Location: [facility]     AMPUTATE TOE(S) Right 8/2/2019    Procedure: Right fourth toe partial amputation for treatment of osteomyelitis.;  Surgeon: Jack Younger DPM;  Location: [facility]     AMPUTATE TOE(S) Left 11/8/2019    Procedure: Left second toe amputation at the metatarsophalangeal joint.;  Surgeon: Rachelle Manriquez DPM, Podiatry/Foot and Ankle Surgery;  Location: [facility] OR     ANGIOGRAM       COLONOSCOPY       COMBINED CYSTOSCOPY, RETROGRADES, URETEROSCOPY, INSERT STENT Left 8/21/2016    Procedure: COMBINED CYSTOSCOPY, RETROGRADES, URETEROSCOPY, INSERT STENT;  Surgeon: Artemio Valenzuela MD;  Location:  OR     COMBINED CYSTOSCOPY, RETROGRADES, " URETEROSCOPY, LASER HOLMIUM LITHOTRIPSY URETER(S), INSERT STENT Left 10/3/2016    Procedure: COMBINED CYSTOSCOPY, RETROGRADES, URETEROSCOPY, LASER HOLMIUM LITHOTRIPSY URETER(S), INSERT STENT;  Surgeon: Artemio Valenzuela MD;  Location: [facility]     EXTRACORPOREAL SHOCK WAVE LITHOTRIPSY (ESWL) Left 9/8/2016    Procedure: EXTRACORPOREAL SHOCK WAVE LITHOTRIPSY (ESWL);  Surgeon: Artemio Valenzuela MD;  Location: [facility]     RECESSION GASTROCNEMIUS Right 2/1/2016    Procedure: RECESSION GASTROCNEMIUS;  Surgeon: Rachelle Manriquez, DPM, Pod;  Location: [facility] OR   *  *  Current Outpatient Medications   Medication Sig Dispense Refill     amLODIPine (NORVASC) 5 MG tablet TAKE 1 TABLET(5 MG) BY MOUTH TWICE DAILY 180 tablet 1     aspirin (ASA) 325 MG tablet Take 325 mg by mouth daily       atorvastatin (LIPITOR) 40 MG tablet TAKE 1 TABLET BY MOUTH EVERY DAY 90 tablet 0     blood glucose (NO BRAND SPECIFIED) lancets standard Use to test blood sugar 3 times daily or as directed. 300 each 3     blood glucose monitoring (NO BRAND SPECIFIED) meter device kit Use to test blood sugar 3 times daily or as directed. 1 kit 0     blood glucose monitoring (NO BRAND SPECIFIED) test strip Use to test blood sugars 3 times daily or as directed 300 strip 3     Cholecalciferol (VITAMIN D3 PO) Take 1,000 Units by mouth daily        Co-Enzyme Q10 100 MG CAPS Take 100 mg by mouth daily        Continuous Blood Gluc Sensor (FREESTYLE LUCERO 2 SENSOR SYSTM) MISC 1 each every 14 days 6 each 3     gabapentin (NEURONTIN) 100 MG capsule Take 300 mg by mouth 3 times daily       Glucagon, rDNA, (GLUCAGON EMERGENCY) 1 MG KIT Inject 1 mg into the muscle once for 1 dose 1 kit 0     hydrochlorothiazide (MICROZIDE) 12.5 MG capsule Take 1 capsule (12.5 mg) by mouth every morning 90 capsule 1     insulin aspart (NOVOLOG PEN) 100 UNIT/ML pen 1 units per 9 grams of carbohydrate before each meal + 1:25 over 150 correction.  TDD 70 units 15 mL 3     insulin degludec  (TRESIBA FLEXTOUCH) 100 UNIT/ML pen 45-55 units subcutaneous daily 60 mL 11     Insulin Pen Needle (PEN NEEDLES) 31G X 5 MM MISC 1 Device 4 times daily 200 each 10     ketoconazole (NIZORAL) 2 % external shampoo APPLY EXTERNALLY 2 TIMES A WEEK AS DIRECTED 120 mL 1     lisinopril (ZESTRIL) 40 MG tablet Take 1 tablet (40 mg) by mouth daily 90 tablet 3     MAGNESIUM GLYCINATE PLUS PO Take 400 mg by mouth 2 times daily       metFORMIN (GLUCOPHAGE-XR) 500 MG 24 hr tablet TAKE 2 TABLETS BY MOUTH TWICE DAILY WITH MEALS 360 tablet 3     Multiple Vitamins-Minerals (MULTIVITAMIN PO) Take 1 tablet by mouth daily        Omega-3 Fatty Acids (OMEGA-3 FISH OIL PO) Take 1 g by mouth 2 times daily (with meals)        potassium chloride ER (KLOR-CON M) 10 MEQ CR tablet Take 1 tablet (10 mEq) by mouth 2 times daily 5 tablet 3     semaglutide (OZEMPIC, 1 MG/DOSE,) 2 MG/1.5ML pen Inject 1 mg Subcutaneous every 7 days , on Wednesdays 6 mL 5     Semaglutide, 1 MG/DOSE, 4 MG/3ML SOPN Inject 1 mg Subcutaneous every 7 days on Wednesdays 3 mL 5     tadalafil (CIALIS) 5 MG tablet TAKE 1 TABLET BY MOUTH EVERY DAY AS NEEDED one hour before intercourse 30 tablet 1         EXAM:    Vitals: There were no vitals taken for this visit.  BMI: There is no height or weight on file to calculate BMI.    Constitutional:  Crispin Rojas is in no apparent distress, appears well-nourished.  Cooperative with history and physical exam.    Vascular:  Pedal pulses are palpable for both the DP and PT arteries.  CFT < 3 sec.  No edema.      Neuro: Light touch sensation is grossly diminished, bilateral foot, to the L4, L5, S1 distributions  No evidence of weakness, spasticity, or contracture in the lower extremities.     Derm:   1) Right hallux: ulceration to deeper skin, distal lateral. 2cm diameter. Light surrounding erythema.   2) Right  2nd toe: ulceration dorsally. 2.5cm L x 0.5cm W with depth to deeper skin. Light surrounding erythema.

## 2021-08-17 NOTE — PATIENT INSTRUCTIONS
Thank you for choosing [facility] Podiatry / Foot & Ankle Surgery!    DR. CANDELARIA'S CLINIC LOCATIONS     [facility] SCHEDULE SURGERY: [phone]   [address] [place] [address] APPOINTMENTS: [phone]   [place] [address] BILLING QUESTIONS: [phone]  [phone] RADIOLOGY: [phone]       [place]    [address]  [id number]    [place], [place] [address] [phone]  [phone]      Follow up: 3 weeks    [employer] / CAM WALKING BOOT INSTRUCTIONS  - Do NOT drive with CAM walker on. This is due to safety and legal issues.   - Do NOT wear the CAM walker on long car/train rides or on an airplane.  - Remove the CAM walker several times a day and do ankle range of motion (ROM) exercises/wiggle toes.  - It is recommended that a thick-soled shoe be worn on the other foot to offset any created leg length issue.   - The boot does not have to be worn at night.   - There is an increased risk of developing a blood clot with lower extremity immobilization. ROM exercises and knee-high compression (tenso /ACE wrap) is recommended to lower that risk.   - You should seek medical attention if you experience calf swelling and/or pain, chest pain, or shortness of breath.     If you need to return or exchange the boot, please contact [facility] @ [phone]    SIGNS OF INFECTION    expanding redness around the wound     yellow or greenish-colored pus or cloudy wound drainage     red streaking spreading from the wound     increased swelling, tenderness, or pain around the wound     fever  *If you notice any of these signs of infection, call us right away!      DIABETES AND YOUR FEET  Diabetes can result in several problems in the feet including ulcers (open sores) and amputations. Two of the most important reasons why people develop foot problems when they have diabetes is : 1. Neuropathy (loss of feeling)  2. Vascular disease (loss or decrease of blood flow).    Neuropathy is a term used to  "describe a loss of nerve function.  Patients with diabetes are at risk of developing neuropathy if their sugars continue to run high and are above the normal value. One theory for neuropathy is that the \"extra\" sugar in the body enters the nerves and is broken down. These by-products build up in the nerve causing it to swell and impairing nerve function. Often times, this can be prevented by controlling your sugars, dieting and exercise.    When a person develops neuropathy, they usually begin to feel numbness or tingling in their feet and sometime in their legs.  Other symptoms may include painful burning or hot feet, tingling or feeling like insects or ants are crawling on your feet or legs.  If the diabetes is sever and the sugars run high for long periods of time, neuropathy can also occur in the hands.    Vascular disease  is a term used to describe a loss or decrease in circulation (blood flow). There is a problem in getting blood and oxygen to areas that need it. Similar to neuropathy, sugars can build up in the walls of the arteries (blood vessels) and cause them to become swollen, thickened and hardened. This decreases the amount of blood that can go to an area that needs it. Though this is common in the legs of diabetic patients, it can also affect other arteries (blood vessels) in the body such as in the heart and eyes.    In the legs, vascular disease usually results in cramping. Patients who develop leg cramps after walking the same distance every time (i.e. One block, half a mile, ect.) need to let their doctors know so that their circulation may be checked. Cramps causing severe pain in the feet and/or legs while sleeping and the cramps go away when you stand or hang your legs off the side of the bed, may also be a sign of poor blood circulation.  Occasional cramping in cold weather or on rare occasions with activity may not be due to poor circulation, but you should inform your doctor.    PREVENTION " "OF THESE DISEASES  The key to prevention is good blood sugar control. Poor blood sugar control is a big reason many of these problems start. Physical activity (exercise) is a very good way to help decrease your blood sugars. Exercise can lower your blood sugar, blood pressure, and cholesterol. It also reduces your risk for heart disease and stroke, relieves stress, and strengthens your heart, muscles and bones.  In addition, regular activity helps insulin work better, improves your blood circulation, and keeps your joints flexible. If you're trying to lose weight, a combination of exercise and wise food choices can help you reach your target weight and maintain it.      PAIN MANAGEMENT (**Please speak with your primary doctor about any medications**)  1.Blood Sugar Control - Most important  2. Medications such as:  Amytriptylline, duloxetine, gabapentin, lyrica, tramadol (talk with your primary care doctor about this).     NUTRITION:  Nutrition is also important to help with healing. If your body does not have what it needs, it can't heal.   1. Increasing your protein intake is important.  With wounds you need 60-90gm of protein a day to help with healing. Over the counter protein shakes such as Neymar, Glucerna, Ensure, ect... can help to supplement your daily protein intake.   2. It is also important to take Vitamins to help with healing.  Vitamins such as B12, B6 and Vitamin D3 are important for healing. These can be gotten over the counter at pharmacies or at stores like [employer] or the [employer].    I can also prescribe a dietary supplement called \"Rheumate\" that has a lot of essential vitamins in one capsule.  This may not be covered by insurance though.     FOOT CARE RECOMMENDATIONS   1. Wash your feet with lukewarm water and a mild soap and then dry them thoroughly, especially between the toes.     2. Examine your feet daily looking for cuts, corns, blisters, cracks, ect, especially after wearing new shoes. " Make sure to look between your toes. If you cannot see the bottom of your feet, set a mirror on the floor and hold your foot over it, or ask a spouse, friend or family member to examine your feet for you. Contact your doctor immediately if new problems are noted or if sores are not healing.     3. Immediately apply moisturizer to the tops and bottoms of your feet, avoiding areas between the toes. Hand lotion (Intesive Care, Candie, Eucerin, Neutrogena, Curel, ect) is sufficient unless your doctor prescribes a medicated lotion. Apply sunscreen to your feet when going swimming outside.     4. Use clean comfortable shoes, wear white socks (if you have any bleeding or drainage, you will see it on white socks). Socks should not have thick seams or cut off the circulation around the leg. Break in new shoes slowly and rotate with older shoes until broken in. Check the inside of your shoes with your hand to look for areas of irritation or objects that may have fallen into your shoes.       5. Keep slippers by the side of your bed for use during the night.     6.  Shoes should be fitted by a professional and should not cause areas of irritation.  Check your feet regularly when wearing a new pair of shoes and replace them as needed.     7.  Talk to your doctor about proper exercise. Exercise and stretching stimulate blood flow to your feet and maintain proper glucose levels.     8.  Monitor your blood glucose level as instructed by your doctor. Notify your doctor immediately if your blood sugar is abnormally high or low.    9. Cut your nails straight across, but then gently round any sharp edges with a cardboard nail file. If you have neuropathy, peripheral vascular disease or cannot see that well to trim your own toenails contact [facility] ([phone]) or [facility] ([phone]).      THINGS TO AVOID DOING   1.  Do not soak your feet if you have an open sore. Use only lukewarm water and always check the temperature  with your hand as hot water can easily burn your feet.       2.  Never use a hot water bottle or heating pad on your feet. Also do not apply cold compresses to your feet. With decreased sensation, you could burn or freeze your feet.       3.  Do not apply any of these to your feet:    -  Over the counter medicine for corns or warts    -  Harsh chemicals like boric acid    -  Do not self-treat corns, cuts, blisters or infections. Always consult your doctor.       4.  Do not wear sandals, slippers or walk barefoot, especially on hot sand or concrete or other harsh surfaces.     5.  If you smoke, stop!!!

## 2021-09-08 DIAGNOSIS — E11.49 DIABETES MELLITUS TYPE 2 WITH NEUROLOGICAL MANIFESTATIONS (H): ICD-10-CM

## 2021-09-08 DIAGNOSIS — E11.52 TYPE 2 DIABETES MELLITUS WITH DIABETIC PERIPHERAL ANGIOPATHY AND GANGRENE, WITH LONG-TERM CURRENT USE OF INSULIN (H): ICD-10-CM

## 2021-09-08 DIAGNOSIS — Z79.4 TYPE 2 DIABETES MELLITUS WITH DIABETIC PERIPHERAL ANGIOPATHY AND GANGRENE, WITH LONG-TERM CURRENT USE OF INSULIN (H): ICD-10-CM

## 2021-09-09 ENCOUNTER — OFFICE VISIT (OUTPATIENT)
Dept: PODIATRY | Facility: CLINIC | Age: 61
End: 2021-09-09
Payer: COMMERCIAL

## 2021-09-09 VITALS
BODY MASS INDEX: 31.97 KG/M2 | SYSTOLIC BLOOD PRESSURE: 124 MMHG | HEIGHT: 72 IN | DIASTOLIC BLOOD PRESSURE: 74 MMHG | WEIGHT: 236 LBS

## 2021-09-09 DIAGNOSIS — L97.511 DIABETIC ULCER OF TOE OF RIGHT FOOT ASSOCIATED WITH TYPE 2 DIABETES MELLITUS, LIMITED TO BREAKDOWN OF SKIN (H): Primary | ICD-10-CM

## 2021-09-09 DIAGNOSIS — M20.42 HAMMER TOES OF BOTH FEET: ICD-10-CM

## 2021-09-09 DIAGNOSIS — E11.42 TYPE 2 DIABETES MELLITUS WITH PERIPHERAL NEUROPATHY (H): ICD-10-CM

## 2021-09-09 DIAGNOSIS — M20.41 HAMMER TOES OF BOTH FEET: ICD-10-CM

## 2021-09-09 DIAGNOSIS — E11.621 DIABETIC ULCER OF TOE OF RIGHT FOOT ASSOCIATED WITH TYPE 2 DIABETES MELLITUS, LIMITED TO BREAKDOWN OF SKIN (H): Primary | ICD-10-CM

## 2021-09-09 PROCEDURE — 99213 OFFICE O/P EST LOW 20 MIN: CPT | Mod: 25 | Performed by: PODIATRIST

## 2021-09-09 PROCEDURE — 97597 DBRDMT OPN WND 1ST 20 CM/<: CPT | Performed by: PODIATRIST

## 2021-09-09 RX ORDER — INSULIN ASPART 100 [IU]/ML
INJECTION, SOLUTION INTRAVENOUS; SUBCUTANEOUS
Qty: 15 ML | Refills: 0 | Status: SHIPPED | OUTPATIENT
Start: 2021-09-09 | End: 2021-09-30

## 2021-09-09 ASSESSMENT — MIFFLIN-ST. JEOR: SCORE: 1915.55

## 2021-09-09 NOTE — TELEPHONE ENCOUNTER
Please call and set up appointment with lab prior, pt is due for f/u. Will send refill when appointment is made.

## 2021-09-09 NOTE — PROGRESS NOTES
ASSESSMENT:  Encounter Diagnoses   Name Primary?     Diabetic ulcer of toe of right foot associated with type 2 diabetes mellitus, limited to breakdown of skin (H) Yes     Type 2 diabetes mellitus with peripheral neuropathy (H)      Hammer toes of both feet      MEDICAL DECISION MAKING:  Interval decrease in erythema and edema involving the right second toe.  Both wounds appear stable.  I do not think additional oral antibiotic is indicated at this time.    He will continue with daily cleansing.  I recommend he use either Santyl or Silvadene. We decided on Santyl.  We will continue with a gauze dressing.    Using a tissue nippers, debridement of the right hallux ulceration was performed.  This consisted of debridement of desquamated skin.  Debridement was no deeper than the skin level and involved an area of less than 20 cm .    Follow-up in 3 weeks.  He is to notify us if he notices increasing redness and swelling of the right second toe. If there is rapid increase, he is to present to the emergency department.    Disclaimer: This note consists of symbols derived from keyboarding, dictation and/or voice recognition software. As a result, there may be errors in the script that have gone undetected. Please consider this when interpreting information found in this chart.    Valentino Molina DPM, FACFAS, MS    [place] Department of Podiatry/Foot & Ankle Surgery      ____________________________________________________________________    HPI:         Chief Complaint: Follow-up for ulcerations on the right hallux and second toe  Onset of problem: 8/14/2021  No pain.  Peripheral neuropathy.  Type 2 diabetes.  I last evaluated him on 8/17/2021.  There was concern for cellulitis and Bactrim DS was prescribed. He has finished this.  He is cleansing the wounds daily and using both Silvadene and Santyl ointment. He covers them with a light dressing.  He does not think his work boots are affecting these wounds.  He cannot wear  the short cam walker at work.  History of toe amputations to treat osteomyelitis.  *  Past Medical History:   Diagnosis Date     Cerebral infarction (H)      Chronic infection      History of heartburn      Hyperlipidemia LDL goal <70      Hypertension      Numbness and tingling      Obesity      GILA (obstructive sleep apnea) 10/22/2018     Renal stone      Type II or unspecified type diabetes mellitus without mention of complication, not stated as uncontrolled    *  *  Past Surgical History:   Procedure Laterality Date     AMPUTATE FOOT Left 8/18/2016    Procedure: AMPUTATE FOOT;  Surgeon: Jack Younger DPM;  Location: [facility] OR     AMPUTATE TOE(S) Right 2/1/2016    Procedure: AMPUTATE TOE(S);  Surgeon: Rahcelle Manriquez DPM, Pod;  Location: [facility] OR     AMPUTATE TOE(S) Right 8/2/2019    Procedure: Right fourth toe partial amputation for treatment of osteomyelitis.;  Surgeon: Jack Younger DPM;  Location: [facility] OR     AMPUTATE TOE(S) Left 11/8/2019    Procedure: Left second toe amputation at the metatarsophalangeal joint.;  Surgeon: Rachelle Manriquez DPM, Podiatry/Foot and Ankle Surgery;  Location: [facility] OR     ANGIOGRAM       COLONOSCOPY       COMBINED CYSTOSCOPY, RETROGRADES, URETEROSCOPY, INSERT STENT Left 8/21/2016    Procedure: COMBINED CYSTOSCOPY, RETROGRADES, URETEROSCOPY, INSERT STENT;  Surgeon: Artemio Valenzuela MD;  Location: [facility] OR     COMBINED CYSTOSCOPY, RETROGRADES, URETEROSCOPY, LASER HOLMIUM LITHOTRIPSY URETER(S), INSERT STENT Left 10/3/2016    Procedure: COMBINED CYSTOSCOPY, RETROGRADES, URETEROSCOPY, LASER HOLMIUM LITHOTRIPSY URETER(S), INSERT STENT;  Surgeon: Artemio Valenzuela MD;  Location: [facility] OR     EXTRACORPOREAL SHOCK WAVE LITHOTRIPSY (ESWL) Left 9/8/2016    Procedure: EXTRACORPOREAL SHOCK WAVE LITHOTRIPSY (ESWL);  Surgeon: Artemio Valenzuela MD;  Location: [facility] OR     RECESSION GASTROCNEMIUS Right 2/1/2016    Procedure: RECESSION GASTROCNEMIUS;  Surgeon: Rachelle Manriquez DPM, Pod;   Location:  OR   *  *  Current Outpatient Medications   Medication Sig Dispense Refill     amLODIPine (NORVASC) 5 MG tablet TAKE 1 TABLET(5 MG) BY MOUTH TWICE DAILY 180 tablet 1     aspirin (ASA) 325 MG tablet Take 325 mg by mouth daily       atorvastatin (LIPITOR) 40 MG tablet TAKE 1 TABLET BY MOUTH EVERY DAY 90 tablet 0     blood glucose (NO BRAND SPECIFIED) lancets standard Use to test blood sugar 3 times daily or as directed. 300 each 3     blood glucose monitoring (NO BRAND SPECIFIED) meter device kit Use to test blood sugar 3 times daily or as directed. 1 kit 0     blood glucose monitoring (NO BRAND SPECIFIED) test strip Use to test blood sugars 3 times daily or as directed 300 strip 3     Cholecalciferol (VITAMIN D3 PO) Take 1,000 Units by mouth daily        Co-Enzyme Q10 100 MG CAPS Take 100 mg by mouth daily        Continuous Blood Gluc Sensor (FREESTYLE LUCERO 2 SENSOR SYSTM) MISC 1 each every 14 days 6 each 3     gabapentin (NEURONTIN) 100 MG capsule Take 300 mg by mouth 3 times daily       Glucagon, rDNA, (GLUCAGON EMERGENCY) 1 MG KIT Inject 1 mg into the muscle once for 1 dose 1 kit 0     hydrochlorothiazide (MICROZIDE) 12.5 MG capsule Take 1 capsule (12.5 mg) by mouth every morning 90 capsule 1     insulin aspart (NOVOLOG PEN) 100 UNIT/ML pen 1 units per 9 grams of carbohydrate before each meal + 1:25 over 150 correction.  TDD 70 units 15 mL 3     insulin degludec (TRESIBA FLEXTOUCH) 100 UNIT/ML pen 45-55 units subcutaneous daily 60 mL 11     Insulin Pen Needle (PEN NEEDLES) 31G X 5 MM MISC 1 Device 4 times daily 200 each 10     ketoconazole (NIZORAL) 2 % external shampoo APPLY EXTERNALLY 2 TIMES A WEEK AS DIRECTED 120 mL 1     lisinopril (ZESTRIL) 40 MG tablet Take 1 tablet (40 mg) by mouth daily 90 tablet 3     MAGNESIUM GLYCINATE PLUS PO Take 400 mg by mouth 2 times daily       metFORMIN (GLUCOPHAGE-XR) 500 MG 24 hr tablet TAKE 2 TABLETS BY MOUTH TWICE DAILY WITH MEALS 360 tablet 3     Multiple  "Vitamins-Minerals (MULTIVITAMIN PO) Take 1 tablet by mouth daily        Omega-3 Fatty Acids (OMEGA-3 FISH OIL PO) Take 1 g by mouth 2 times daily (with meals)        potassium chloride ER (KLOR-CON M) 10 MEQ CR tablet Take 1 tablet (10 mEq) by mouth 2 times daily 5 tablet 3     semaglutide (OZEMPIC, 1 MG/DOSE,) 2 MG/1.5ML pen Inject 1 mg Subcutaneous every 7 days , on Wednesdays 6 mL 5     Semaglutide, 1 MG/DOSE, 4 MG/3ML SOPN Inject 1 mg Subcutaneous every 7 days on Wednesdays 3 mL 5     silver sulfADIAZINE (SILVADENE) 1 % external cream Apply topically daily 50 g 1     sulfamethoxazole-trimethoprim (BACTRIM DS) 800-160 MG tablet Take 1 tablet by mouth 2 times daily 20 tablet 0     tadalafil (CIALIS) 5 MG tablet TAKE 1 TABLET BY MOUTH EVERY DAY AS NEEDED one hour before intercourse 30 tablet 1         EXAM:    Vitals: /74   Ht 1.816 m (5' 11.5\")   Wt 107 kg (236 lb)   BMI 32.46 kg/m    BMI: Body mass index is 32.46 kg/m .    Constitutional:  Crispin Rojas is in no apparent distress, appears well-nourished.  Cooperative with history and physical exam.     Vascular:  Pedal pulses are palpable for both the DP and PT arteries.  CFT < 3 sec.  No edema.       Neuro: Light touch sensation is grossly diminished, bilateral foot, to the L4, L5, S1 distributions  No evidence of weakness, spasticity, or contracture in the lower extremities.      Derm:   1) Right hallux: ulceration to deeper skin, distal lateral. 0.6cm diameter. Hyperkeratotic eschar around the margins. The base is a mix of granular and fibrous tissue. This wound is to the level of deeper skin.    2) Right  2nd toe: ulceration dorsally. 0.2 cm L x 0.7cm W with depth to deeper skin. Light surrounding erythema and mild edema of the toe. The base is granular.           "

## 2021-09-14 ENCOUNTER — LAB (OUTPATIENT)
Dept: LAB | Facility: CLINIC | Age: 61
End: 2021-09-14
Payer: COMMERCIAL

## 2021-09-14 DIAGNOSIS — E11.9 DIABETES MELLITUS, TYPE 2 (H): ICD-10-CM

## 2021-09-14 LAB — HBA1C MFR BLD: 6.2 % (ref 0–5.6)

## 2021-09-14 PROCEDURE — 80048 BASIC METABOLIC PNL TOTAL CA: CPT

## 2021-09-14 PROCEDURE — 83036 HEMOGLOBIN GLYCOSYLATED A1C: CPT

## 2021-09-14 PROCEDURE — 36415 COLL VENOUS BLD VENIPUNCTURE: CPT

## 2021-09-15 LAB
ANION GAP SERPL CALCULATED.3IONS-SCNC: 3 MMOL/L (ref 3–14)
BUN SERPL-MCNC: 20 MG/DL (ref 7–30)
CALCIUM SERPL-MCNC: 8.4 MG/DL (ref 8.5–10.1)
CHLORIDE BLD-SCNC: 108 MMOL/L (ref 94–109)
CO2 SERPL-SCNC: 28 MMOL/L (ref 20–32)
CREAT SERPL-MCNC: 1.01 MG/DL (ref 0.66–1.25)
GFR SERPL CREATININE-BSD FRML MDRD: 81 ML/MIN/1.73M2
GLUCOSE BLD-MCNC: 270 MG/DL (ref 70–99)
POTASSIUM BLD-SCNC: 4.8 MMOL/L (ref 3.4–5.3)
SODIUM SERPL-SCNC: 139 MMOL/L (ref 133–144)

## 2021-09-22 ENCOUNTER — TRANSFERRED RECORDS (OUTPATIENT)
Dept: HEALTH INFORMATION MANAGEMENT | Facility: CLINIC | Age: 61
End: 2021-09-22

## 2021-09-23 ENCOUNTER — TELEPHONE (OUTPATIENT)
Dept: FAMILY MEDICINE | Facility: CLINIC | Age: 61
End: 2021-09-23

## 2021-09-23 DIAGNOSIS — N18.30 DIABETES MELLITUS DUE TO UNDERLYING CONDITION WITH STAGE 3 CHRONIC KIDNEY DISEASE, UNSPECIFIED WHETHER LONG TERM INSULIN USE, UNSPECIFIED WHETHER STAGE 3A OR 3B CKD (H): Primary | ICD-10-CM

## 2021-09-23 DIAGNOSIS — E08.22 DIABETES MELLITUS DUE TO UNDERLYING CONDITION WITH STAGE 3 CHRONIC KIDNEY DISEASE, UNSPECIFIED WHETHER LONG TERM INSULIN USE, UNSPECIFIED WHETHER STAGE 3A OR 3B CKD (H): Primary | ICD-10-CM

## 2021-09-23 NOTE — TELEPHONE ENCOUNTER
"Patient calling requesting refill of furosemide rx. Chart review shows stopped taking 8/10/2020. Patient reports this was an \"as needed\" medication and still uses. Please review and advise on rx request.     Sonny YBARRA RN    "

## 2021-09-24 RX ORDER — FUROSEMIDE 20 MG
20 TABLET ORAL DAILY
Qty: 30 TABLET | Refills: 1 | Status: SHIPPED | OUTPATIENT
Start: 2021-09-24 | End: 2022-01-03

## 2021-09-29 DIAGNOSIS — I10 BENIGN ESSENTIAL HYPERTENSION: ICD-10-CM

## 2021-09-29 DIAGNOSIS — E11.49 DIABETES MELLITUS TYPE 2 WITH NEUROLOGICAL MANIFESTATIONS (H): ICD-10-CM

## 2021-09-29 DIAGNOSIS — E11.52 TYPE 2 DIABETES MELLITUS WITH DIABETIC PERIPHERAL ANGIOPATHY AND GANGRENE, WITH LONG-TERM CURRENT USE OF INSULIN (H): ICD-10-CM

## 2021-09-29 DIAGNOSIS — Z79.4 TYPE 2 DIABETES MELLITUS WITH DIABETIC PERIPHERAL ANGIOPATHY AND GANGRENE, WITH LONG-TERM CURRENT USE OF INSULIN (H): ICD-10-CM

## 2021-09-30 RX ORDER — INSULIN ASPART 100 [IU]/ML
INJECTION, SOLUTION INTRAVENOUS; SUBCUTANEOUS
Qty: 15 ML | Refills: 1 | Status: SHIPPED | OUTPATIENT
Start: 2021-09-30 | End: 2022-01-03

## 2021-09-30 RX ORDER — LISINOPRIL 40 MG/1
TABLET ORAL
Qty: 90 TABLET | Refills: 0 | Status: SHIPPED | OUTPATIENT
Start: 2021-09-30 | End: 2022-01-20

## 2021-09-30 NOTE — TELEPHONE ENCOUNTER
Prescription approved per [facility] Refill Protocol.  JAIME refill. Needs appointment.  MA/TC to assist patient with scheduling follow up appointment.  Bee Nogueira RN

## 2021-10-01 NOTE — TELEPHONE ENCOUNTER
[facility] on home/cell number, also sent a  My-chart message    Shania Warren/KOBI Duran---[facility]

## 2021-10-06 NOTE — ED TRIAGE NOTES
Wound on right second toe for the past couple weeks.  Patient is diabetic.  No fever but occasional chills.   
Health Care Proxy (HCP)

## 2021-10-13 DIAGNOSIS — E11.42 TYPE 2 DIABETES MELLITUS WITH PERIPHERAL NEUROPATHY (H): ICD-10-CM

## 2021-10-13 RX ORDER — FLASH GLUCOSE SENSOR
KIT MISCELLANEOUS
Qty: 2 EACH | OUTPATIENT
Start: 2021-10-13

## 2021-10-13 RX ORDER — FLASH GLUCOSE SENSOR
KIT MISCELLANEOUS
Qty: 2 EACH | Status: CANCELLED | OUTPATIENT
Start: 2021-10-13

## 2021-10-15 ENCOUNTER — TRANSFERRED RECORDS (OUTPATIENT)
Dept: SURGERY | Facility: CLINIC | Age: 61
End: 2021-10-15

## 2021-10-15 RX ORDER — FLASH GLUCOSE SENSOR
KIT MISCELLANEOUS
Qty: 2 EACH | Refills: 3 | Status: SHIPPED | OUTPATIENT
Start: 2021-10-15 | End: 2022-02-15

## 2021-10-21 ENCOUNTER — OFFICE VISIT (OUTPATIENT)
Dept: PODIATRY | Facility: CLINIC | Age: 61
End: 2021-10-21
Payer: COMMERCIAL

## 2021-10-21 ENCOUNTER — ANCILLARY PROCEDURE (OUTPATIENT)
Dept: GENERAL RADIOLOGY | Facility: CLINIC | Age: 61
End: 2021-10-21
Attending: PODIATRIST
Payer: COMMERCIAL

## 2021-10-21 VITALS
SYSTOLIC BLOOD PRESSURE: 104 MMHG | WEIGHT: 243 LBS | HEIGHT: 72 IN | BODY MASS INDEX: 32.91 KG/M2 | DIASTOLIC BLOOD PRESSURE: 64 MMHG

## 2021-10-21 DIAGNOSIS — L97.512 ULCER OF TOE, RIGHT, WITH FAT LAYER EXPOSED (H): Primary | ICD-10-CM

## 2021-10-21 DIAGNOSIS — L97.512 ULCER OF TOE, RIGHT, WITH FAT LAYER EXPOSED (H): ICD-10-CM

## 2021-10-21 DIAGNOSIS — M20.41 HAMMER TOES OF BOTH FEET: ICD-10-CM

## 2021-10-21 DIAGNOSIS — M20.42 HAMMER TOES OF BOTH FEET: ICD-10-CM

## 2021-10-21 DIAGNOSIS — E11.42 TYPE 2 DIABETES MELLITUS WITH PERIPHERAL NEUROPATHY (H): ICD-10-CM

## 2021-10-21 DIAGNOSIS — L03.031 CELLULITIS OF TOE, RIGHT: ICD-10-CM

## 2021-10-21 PROCEDURE — 73660 X-RAY EXAM OF TOE(S): CPT | Mod: RT | Performed by: RADIOLOGY

## 2021-10-21 PROCEDURE — 99214 OFFICE O/P EST MOD 30 MIN: CPT | Mod: 25 | Performed by: PODIATRIST

## 2021-10-21 PROCEDURE — 11042 DBRDMT SUBQ TIS 1ST 20SQCM/<: CPT | Performed by: PODIATRIST

## 2021-10-21 RX ORDER — PRIMIDONE 50 MG/1
TABLET ORAL 2 TIMES DAILY
COMMUNITY
Start: 2021-10-13 | End: 2023-01-26

## 2021-10-21 RX ORDER — OMEPRAZOLE 40 MG/1
40 CAPSULE, DELAYED RELEASE ORAL DAILY
COMMUNITY
Start: 2021-10-16

## 2021-10-21 ASSESSMENT — MIFFLIN-ST. JEOR: SCORE: 1947.3

## 2021-10-21 NOTE — LETTER
10/21/2021         RE: Crispin Rojas  [id number]  [facility] [id number]        Dear Colleague,    Thank you for referring your patient, Crispin Rojas, to the [facility]. Please see a copy of my visit note below.    ASSESSMENT:  Encounter Diagnoses   Name Primary?     Ulcer of toe, right, with fat layer exposed (H) Yes     Cellulitis of toe, right      Type 2 diabetes mellitus with peripheral neuropathy (H)      Hammer toes of both feet      MEDICAL DECISION MAKING:  Although the right second toe is not significantly erythematous, there is significant edema suggesting the possibility of cellulitis and/or deeper infection.     He is at risk for worsening infection.  We reviewed the clinical signs of infection.    I placed him on Augmentin 875-125 mg p.o. twice daily for 10 days.    Is a monitor for increasing redness and swelling and seek immediate medical attention if these occur.    We will continue with current wound cares: Daily cleansing, topical antibiotic and bandage.    He was given a right and left crest pad to help elevate the tips of the second toes.  The use of these was demonstrated.    I personally reviewed the x-ray images with pad.  Do not appreciate any obvious or significant disruption of bone to suggest osteomyelitis at the tip of the right second toe.    Will await the official radiology read.    Follow-up in 3 to 4 weeks.    Using a #15 scalpel, excisional debridement of the right second toe ulcer was performed.  The hyperkeratotic eschar was excised.  The ulcer base was debrided.  Debridement was carried out down to the fat layer.  There was minimal bleeding.  The wound was cleansed with alcohol wipe.  Bacitracin and a Band-Aid applied.    Disclaimer: This note consists of symbols derived from keyboarding, dictation and/or voice recognition software. As a result, there may be errors in the script that have gone undetected. Please consider this when  interpreting information found in this chart.    Valentino Molina DPM, FACFAS, MS    [place] [facility] of Podiatry/Foot & Ankle Surgery      ____________________________________________________________________    HPI:         Chief Complaint: follow up for ulcerations of the right hallux and right second toe.   Onset of problem: 8/14/2021  No pain.  Peripheral neuropathy.  Type 2 diabetes.  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  *  Past Medical History:   Diagnosis Date     Cerebral infarction (H)      Chronic infection      History of heartburn      Hyperlipidemia LDL goal <70      Hypertension      Numbness and tingling      Obesity      GILA (obstructive sleep apnea) 10/22/2018     Renal stone      Type II or unspecified type diabetes mellitus without mention of complication, not stated as uncontrolled    *  *  Past Surgical History:   Procedure Laterality Date     AMPUTATE FOOT Left 8/18/2016    Procedure: AMPUTATE FOOT;  Surgeon: Jack Younger DPM;  Location: [facility]     AMPUTATE TOE(S) Right 2/1/2016    Procedure: AMPUTATE TOE(S);  Surgeon: Rachelle Manriquez DPM, Pod;  Location: [facility] OR     AMPUTATE TOE(S) Right 8/2/2019    Procedure: Right fourth toe partial amputation for treatment of osteomyelitis.;  Surgeon: Jack Younger DPM;  Location: [facility]     AMPUTATE TOE(S) Left 11/8/2019    Procedure: Left second toe amputation at the metatarsophalangeal joint.;  Surgeon: Rachelle Manriquez DPM, Podiatry/Foot and Ankle Surgery;  Location: [facility] OR     ANGIOGRAM       COLONOSCOPY       COMBINED CYSTOSCOPY, RETROGRADES, URETEROSCOPY, INSERT STENT Left 8/21/2016    Procedure: COMBINED CYSTOSCOPY, RETROGRADES, URETEROSCOPY, INSERT STENT;  Surgeon: Artemio Valenzuela MD;  Location: [facility] OR     COMBINED CYSTOSCOPY, RETROGRADES, URETEROSCOPY, LASER HOLMIUM LITHOTRIPSY URETER(S), INSERT STENT Left 10/3/2016    Procedure: COMBINED CYSTOSCOPY, RETROGRADES, URETEROSCOPY, LASER HOLMIUM  LITHOTRIPSY URETER(S), INSERT STENT;  Surgeon: Artemio Valenzuela MD;  Location: [facility]     EXTRACORPOREAL SHOCK WAVE LITHOTRIPSY (ESWL) Left 9/8/2016    Procedure: EXTRACORPOREAL SHOCK WAVE LITHOTRIPSY (ESWL);  Surgeon: Artemio Valenzuela MD;  Location: [facility] OR     RECESSION GASTROCNEMIUS Right 2/1/2016    Procedure: RECESSION GASTROCNEMIUS;  Surgeon: Rachelle Manriquez, DPM, Pod;  Location: [facility] OR   *  *  Current Outpatient Medications   Medication Sig Dispense Refill     amLODIPine (NORVASC) 5 MG tablet Take one tablet twice daily  SEE MD THIS QUARTER 180 tablet 0     aspirin (ASA) 325 MG tablet Take 325 mg by mouth daily       atorvastatin (LIPITOR) 40 MG tablet TAKE 1 TABLET BY MOUTH EVERY DAY 90 tablet 0     blood glucose (NO BRAND SPECIFIED) lancets standard Use to test blood sugar 3 times daily or as directed. 300 each 3     blood glucose monitoring (NO BRAND SPECIFIED) meter device kit Use to test blood sugar 3 times daily or as directed. 1 kit 0     blood glucose monitoring (NO BRAND SPECIFIED) test strip Use to test blood sugars 3 times daily or as directed 300 strip 3     Cholecalciferol (VITAMIN D3 PO) Take 1,000 Units by mouth daily        Co-Enzyme Q10 100 MG CAPS Take 100 mg by mouth daily        Continuous Blood Gluc Sensor (FREESTYLE LUCERO 14 DAY SENSOR) [facility] USE ONE EVERY 14 DAYS 2 each 3     furosemide (LASIX) 20 MG tablet Take 1 tablet (20 mg) by mouth daily As needed for edema 30 tablet 1     gabapentin (NEURONTIN) 100 MG capsule Take 300 mg by mouth 3 times daily       Glucagon, rDNA, (GLUCAGON EMERGENCY) 1 MG KIT Inject 1 mg into the muscle once for 1 dose 1 kit 0     hydrochlorothiazide (MICROZIDE) 12.5 MG capsule Take 1 capsule (12.5 mg) by mouth every morning 90 capsule 1     Insulin Aspart FlexPen 100 UNIT/ML SOPN INJECT AS DIRECTED PER SLIDING SCALE BEFORE EACH MEAL. MAX 70 UNITS DAILY 15 mL 1     insulin degludec (TRESIBA FLEXTOUCH) 100 UNIT/ML pen 45-55 units subcutaneous daily 60 mL  "11     Insulin Pen Needle (PEN NEEDLES) 31G X 5 MM MISC 1 Device 4 times daily 200 each 10     ketoconazole (NIZORAL) 2 % external shampoo Apply topically daily as needed for itching or irritation See MD after 6 weeks 120 mL 1     lisinopril (ZESTRIL) 40 MG tablet TAKE 1 TABLET(40 MG) BY MOUTH DAILY 90 tablet 0     MAGNESIUM GLYCINATE PLUS PO Take 400 mg by mouth 2 times daily       metFORMIN (GLUCOPHAGE-XR) 500 MG 24 hr tablet TAKE 2 TABLETS BY MOUTH TWICE DAILY WITH MEALS 360 tablet 3     Multiple Vitamins-Minerals (MULTIVITAMIN PO) Take 1 tablet by mouth daily        Omega-3 Fatty Acids (OMEGA-3 FISH OIL PO) Take 1 g by mouth 2 times daily (with meals)        omeprazole (PRILOSEC) 40 MG DR capsule        potassium chloride ER (KLOR-CON M) 10 MEQ CR tablet Take 1 tablet (10 mEq) by mouth 2 times daily 5 tablet 3     primidone (MYSOLINE) 50 MG tablet TAKE 1 TABLET BY MOUTH THREE TIMES DAILY       semaglutide (OZEMPIC, 1 MG/DOSE,) 2 MG/1.5ML pen Inject 1 mg Subcutaneous every 7 days , on Wednesdays 6 mL 5     Semaglutide, 1 MG/DOSE, 4 MG/3ML SOPN Inject 1 mg Subcutaneous every 7 days on Wednesdays 3 mL 5     silver sulfADIAZINE (SILVADENE) 1 % external cream Apply topically daily 50 g 1     sulfamethoxazole-trimethoprim (BACTRIM DS) 800-160 MG tablet Take 1 tablet by mouth 2 times daily 20 tablet 0     tadalafil (CIALIS) 5 MG tablet TAKE 1 TABLET BY MOUTH EVERY DAY AS NEEDED one hour before intercourse 30 tablet 1         EXAM:    Vitals: /64   Ht 1.816 m (5' 11.5\")   Wt 110.2 kg (243 lb)   BMI 33.42 kg/m    BMI: Body mass index is 33.42 kg/m .    Constitutional:  Crispin Rojas is in no apparent distress, appears well-nourished.  Cooperative with history and physical exam.    Vascular:  Pedal pulses are palpable for both the DP and PT arteries.  CFT < 3 sec.  No edema.       Neuro: Light touch sensation is grossly diminished, bilateral foot, to the L4, L5, S1 distributions  No evidence of weakness, " spasticity, or contracture in the lower extremities.      Derm:   1) Right hallux: The ulceration is healing and now a dry scab.     2) Right  2nd toe: The dorsal ulceration is healing with resulting scab.  There is a 0.5 cm diameter ulceration at the tip of this toe.  Depth is considered to be to the fat layer.  It does not probe to bone.  The skin is lightly erythematous and the toe edematous.    3) left second toe: 0.5 cm ulceration of the distal aspect of the toe with a depth to deeper skin.  No associated erythema or edema.    Musculoskeletal: Previous amputation of the left hallux.  Previous partial amputation of the right hallux.  Remaining lesser digits are contracted.  The bilateral second toes are rigid.       X-Ray Findings:  I personally reviewed the right 2nd toe images.  No obvious lytic or erosive changes.              Again, thank you for allowing me to participate in the care of your patient.        Sincerely,        Valentino Molina DPM

## 2021-10-21 NOTE — PROGRESS NOTES
ASSESSMENT:  Encounter Diagnoses   Name Primary?     Ulcer of toe, right, with fat layer exposed (H) Yes     Cellulitis of toe, right      Type 2 diabetes mellitus with peripheral neuropathy (H)      Hammer toes of both feet      MEDICAL DECISION MAKING:  Although the right second toe is not significantly erythematous, there is significant edema suggesting the possibility of cellulitis and/or deeper infection.     He is at risk for worsening infection.  We reviewed the clinical signs of infection.    I placed him on Augmentin 875-125 mg p.o. twice daily for 10 days.    Is a monitor for increasing redness and swelling and seek immediate medical attention if these occur.    We will continue with current wound cares: Daily cleansing, topical antibiotic and bandage.    He was given a right and left crest pad to help elevate the tips of the second toes.  The use of these was demonstrated.    I personally reviewed the x-ray images with pad.  Do not appreciate any obvious or significant disruption of bone to suggest osteomyelitis at the tip of the right second toe.    Will await the official radiology read.    Follow-up in 3 to 4 weeks.    Using a #15 scalpel, excisional debridement of the right second toe ulcer was performed.  The hyperkeratotic eschar was excised.  The ulcer base was debrided.  Debridement was carried out down to the fat layer.  There was minimal bleeding.  The wound was cleansed with alcohol wipe.  Bacitracin and a Band-Aid applied.    Disclaimer: This note consists of symbols derived from keyboarding, dictation and/or voice recognition software. As a result, there may be errors in the script that have gone undetected. Please consider this when interpreting information found in this chart.    Valentino Molina DPM, FACFAS, MS    [place] [facility] of Podiatry/Foot & Ankle Surgery      ____________________________________________________________________    HPI:         Chief Complaint: follow up for  ulcerations of the right hallux and right second toe.   Onset of problem: 8/14/2021  No pain.  Peripheral neuropathy.  Type 2 diabetes.  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  *  Past Medical History:   Diagnosis Date     Cerebral infarction (H)      Chronic infection      History of heartburn      Hyperlipidemia LDL goal <70      Hypertension      Numbness and tingling      Obesity      GILA (obstructive sleep apnea) 10/22/2018     Renal stone      Type II or unspecified type diabetes mellitus without mention of complication, not stated as uncontrolled    *  *  Past Surgical History:   Procedure Laterality Date     AMPUTATE FOOT Left 8/18/2016    Procedure: AMPUTATE FOOT;  Surgeon: Jack Younger DPM;  Location: [facility]     AMPUTATE TOE(S) Right 2/1/2016    Procedure: AMPUTATE TOE(S);  Surgeon: Rachelle Manriquez DPM, Pod;  Location: [facility] OR     AMPUTATE TOE(S) Right 8/2/2019    Procedure: Right fourth toe partial amputation for treatment of osteomyelitis.;  Surgeon: Jack Younger DPM;  Location: [facility] OR     AMPUTATE TOE(S) Left 11/8/2019    Procedure: Left second toe amputation at the metatarsophalangeal joint.;  Surgeon: Rachelle Manriquez DPM, Podiatry/Foot and Ankle Surgery;  Location: [facility] OR     ANGIOGRAM       COLONOSCOPY       COMBINED CYSTOSCOPY, RETROGRADES, URETEROSCOPY, INSERT STENT Left 8/21/2016    Procedure: COMBINED CYSTOSCOPY, RETROGRADES, URETEROSCOPY, INSERT STENT;  Surgeon: Artemio Valenzuela MD;  Location: [facility]     COMBINED CYSTOSCOPY, RETROGRADES, URETEROSCOPY, LASER HOLMIUM LITHOTRIPSY URETER(S), INSERT STENT Left 10/3/2016    Procedure: COMBINED CYSTOSCOPY, RETROGRADES, URETEROSCOPY, LASER HOLMIUM LITHOTRIPSY URETER(S), INSERT STENT;  Surgeon: Artemio Valenzuela MD;  Location: [facility] OR     EXTRACORPOREAL SHOCK WAVE LITHOTRIPSY (ESWL) Left 9/8/2016    Procedure: EXTRACORPOREAL SHOCK WAVE LITHOTRIPSY (ESWL);  Surgeon: Artemio Valenzuela MD;   Location: [facility] OR     RECESSION GASTROCNEMIUS Right 2/1/2016    Procedure: RECESSION GASTROCNEMIUS;  Surgeon: Rachelle Manriquez, DPM, Pod;  Location: [facility] OR   *  *  Current Outpatient Medications   Medication Sig Dispense Refill     amLODIPine (NORVASC) 5 MG tablet Take one tablet twice daily  SEE MD THIS QUARTER 180 tablet 0     aspirin (ASA) 325 MG tablet Take 325 mg by mouth daily       atorvastatin (LIPITOR) 40 MG tablet TAKE 1 TABLET BY MOUTH EVERY DAY 90 tablet 0     blood glucose (NO BRAND SPECIFIED) lancets standard Use to test blood sugar 3 times daily or as directed. 300 each 3     blood glucose monitoring (NO BRAND SPECIFIED) meter device kit Use to test blood sugar 3 times daily or as directed. 1 kit 0     blood glucose monitoring (NO BRAND SPECIFIED) test strip Use to test blood sugars 3 times daily or as directed 300 strip 3     Cholecalciferol (VITAMIN D3 PO) Take 1,000 Units by mouth daily        Co-Enzyme Q10 100 MG CAPS Take 100 mg by mouth daily        Continuous Blood Gluc Sensor (FREESTYLE LUCERO 14 DAY SENSOR) MISC USE ONE EVERY 14 DAYS 2 each 3     furosemide (LASIX) 20 MG tablet Take 1 tablet (20 mg) by mouth daily As needed for edema 30 tablet 1     gabapentin (NEURONTIN) 100 MG capsule Take 300 mg by mouth 3 times daily       Glucagon, rDNA, (GLUCAGON EMERGENCY) 1 MG KIT Inject 1 mg into the muscle once for 1 dose 1 kit 0     hydrochlorothiazide (MICROZIDE) 12.5 MG capsule Take 1 capsule (12.5 mg) by mouth every morning 90 capsule 1     Insulin Aspart FlexPen 100 UNIT/ML SOPN INJECT AS DIRECTED PER SLIDING SCALE BEFORE EACH MEAL. MAX 70 UNITS DAILY 15 mL 1     insulin degludec (TRESIBA FLEXTOUCH) 100 UNIT/ML pen 45-55 units subcutaneous daily 60 mL 11     Insulin Pen Needle (PEN NEEDLES) 31G X 5 MM MISC 1 Device 4 times daily 200 each 10     ketoconazole (NIZORAL) 2 % external shampoo Apply topically daily as needed for itching or irritation See MD after 6 weeks 120 mL 1     lisinopril (ZESTRIL)  "40 MG tablet TAKE 1 TABLET(40 MG) BY MOUTH DAILY 90 tablet 0     MAGNESIUM GLYCINATE PLUS PO Take 400 mg by mouth 2 times daily       metFORMIN (GLUCOPHAGE-XR) 500 MG 24 hr tablet TAKE 2 TABLETS BY MOUTH TWICE DAILY WITH MEALS 360 tablet 3     Multiple Vitamins-Minerals (MULTIVITAMIN PO) Take 1 tablet by mouth daily        Omega-3 Fatty Acids (OMEGA-3 FISH OIL PO) Take 1 g by mouth 2 times daily (with meals)        omeprazole (PRILOSEC) 40 MG DR capsule        potassium chloride ER (KLOR-CON M) 10 MEQ CR tablet Take 1 tablet (10 mEq) by mouth 2 times daily 5 tablet 3     primidone (MYSOLINE) 50 MG tablet TAKE 1 TABLET BY MOUTH THREE TIMES DAILY       semaglutide (OZEMPIC, 1 MG/DOSE,) 2 MG/1.5ML pen Inject 1 mg Subcutaneous every 7 days , on Wednesdays 6 mL 5     Semaglutide, 1 MG/DOSE, 4 MG/3ML SOPN Inject 1 mg Subcutaneous every 7 days on Wednesdays 3 mL 5     silver sulfADIAZINE (SILVADENE) 1 % external cream Apply topically daily 50 g 1     sulfamethoxazole-trimethoprim (BACTRIM DS) 800-160 MG tablet Take 1 tablet by mouth 2 times daily 20 tablet 0     tadalafil (CIALIS) 5 MG tablet TAKE 1 TABLET BY MOUTH EVERY DAY AS NEEDED one hour before intercourse 30 tablet 1         EXAM:    Vitals: /64   Ht 1.816 m (5' 11.5\")   Wt 110.2 kg (243 lb)   BMI 33.42 kg/m    BMI: Body mass index is 33.42 kg/m .    Constitutional:  Crispin Rojas is in no apparent distress, appears well-nourished.  Cooperative with history and physical exam.    Vascular:  Pedal pulses are palpable for both the DP and PT arteries.  CFT < 3 sec.  No edema.       Neuro: Light touch sensation is grossly diminished, bilateral foot, to the L4, L5, S1 distributions  No evidence of weakness, spasticity, or contracture in the lower extremities.      Derm:   1) Right hallux: The ulceration is healing and now a dry scab.     2) Right  2nd toe: The dorsal ulceration is healing with resulting scab.  There is a 0.5 cm diameter ulceration at the tip " of this toe.  Depth is considered to be to the fat layer.  It does not probe to bone.  The skin is lightly erythematous and the toe edematous.    3) left second toe: 0.5 cm ulceration of the distal aspect of the toe with a depth to deeper skin.  No associated erythema or edema.    Musculoskeletal: Previous amputation of the left hallux.  Previous partial amputation of the right hallux.  Remaining lesser digits are contracted.  The bilateral second toes are rigid.       X-Ray Findings:  I personally reviewed the right 2nd toe images.  No obvious lytic or erosive changes.

## 2021-10-23 ENCOUNTER — HEALTH MAINTENANCE LETTER (OUTPATIENT)
Age: 61
End: 2021-10-23

## 2021-11-12 ENCOUNTER — OFFICE VISIT (OUTPATIENT)
Dept: PODIATRY | Facility: CLINIC | Age: 61
End: 2021-11-12
Payer: COMMERCIAL

## 2021-11-12 VITALS
HEIGHT: 71 IN | SYSTOLIC BLOOD PRESSURE: 124 MMHG | BODY MASS INDEX: 33.04 KG/M2 | WEIGHT: 236 LBS | DIASTOLIC BLOOD PRESSURE: 88 MMHG

## 2021-11-12 DIAGNOSIS — L97.511 ULCER OF RIGHT FOOT, LIMITED TO BREAKDOWN OF SKIN (H): Primary | ICD-10-CM

## 2021-11-12 DIAGNOSIS — E11.42 TYPE 2 DIABETES MELLITUS WITH PERIPHERAL NEUROPATHY (H): ICD-10-CM

## 2021-11-12 DIAGNOSIS — M20.42 HAMMER TOES OF BOTH FEET: ICD-10-CM

## 2021-11-12 DIAGNOSIS — L84 CALLUS OF FOOT: ICD-10-CM

## 2021-11-12 DIAGNOSIS — Z89.429 STATUS POST AMPUTATION OF TOE (H): ICD-10-CM

## 2021-11-12 DIAGNOSIS — M20.41 HAMMER TOES OF BOTH FEET: ICD-10-CM

## 2021-11-12 PROCEDURE — 99213 OFFICE O/P EST LOW 20 MIN: CPT | Mod: 25 | Performed by: PODIATRIST

## 2021-11-12 PROCEDURE — 97597 DBRDMT OPN WND 1ST 20 CM/<: CPT | Mod: 59 | Performed by: PODIATRIST

## 2021-11-12 PROCEDURE — 11055 PARING/CUTG B9 HYPRKER LES 1: CPT | Mod: 51 | Performed by: PODIATRIST

## 2021-11-12 ASSESSMENT — MIFFLIN-ST. JEOR: SCORE: 1907.62

## 2021-11-12 NOTE — PROGRESS NOTES
ASSESSMENT:  Encounter Diagnoses   Name Primary?     Ulcer of right foot, limited to breakdown of skin (H) Yes     Type 2 diabetes mellitus with peripheral neuropathy (H)      Hammer toes of both feet      Status post amputation of toe (H)    Both feet are stable without clinical signs of infection.  Prior wounds have healed with the exception of a superficial wound at the tip of the right second toe.    MEDICAL DECISION MAKING:  Ongoing use of a crest pad and other padding at his discretion.  The crest pad helps the right second toe but elevated the residual hallux causing excoriation dorsally.    He requests a prescription for Santyl ointment.  This is prescribed.    We will continue with basic wound cares: Daily cleansing, application of a topical antibiotic or Santyl, and a dressing.    He reports that he will continue working at the [employer] facility some capacity over the winter.    I reviewed the option of elective surgery to address his hammertoes.  He is rigidly contracted toes put him at high risk for ulceration and toe loss.  He will consider this.    Using #15 scalpel, the thick hyperkeratotic lesion, plantar medial left forefoot, was trimmed down to healthy underlying epithelium.  No bleeding.    Using a #15 scalpel, the distal right second toe ulceration was debrided.  The hyperkeratotic eschar was removed.  The underlying ulcer was scraped to remove bioburden and hypoxic bleeding.  This ulcer is down to the depth of deeper skin.  It was cleansed with an alcohol wipe and a dressing applied.    I encouraged him to follow-up prior to December 5, when he will be leaving town.    Disclaimer: This note consists of symbols derived from keyboarding, dictation and/or voice recognition software. As a result, there may be errors in the script that have gone undetected. Please consider this when interpreting information found in this chart.    Valentino Molina DPM, FACFAS, MS    [facility] of Podiatry/Foot &  Ankle Surgery      ____________________________________________________________________    HPI:         Follows up for an ulceration of the right hallux and right second toe.    Due to edema and some mild erythema of the second toe, he was placed on Augmentin at his last visit on 10/21/2021.  A right foot x-ray was done and did not show any definite lytic or destructive changes in the second toe.  Daily wound cares were reviewed.  He has type 2 diabetes and peripheral neuropathy.  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  Past Medical History:   Diagnosis Date     Cerebral infarction (H)      Chronic infection      History of heartburn      Hyperlipidemia LDL goal <70      Hypertension      Numbness and tingling      Obesity      GILA (obstructive sleep apnea) 10/22/2018     Renal stone      Type II or unspecified type diabetes mellitus without mention of complication, not stated as uncontrolled    *  *  Past Surgical History:   Procedure Laterality Date     AMPUTATE FOOT Left 8/18/2016    Procedure: AMPUTATE FOOT;  Surgeon: Jack Younger DPM;  Location: [facility]     AMPUTATE TOE(S) Right 2/1/2016    Procedure: AMPUTATE TOE(S);  Surgeon: Rachelle Manriquez DPM, Pod;  Location: [facility]     AMPUTATE TOE(S) Right 8/2/2019    Procedure: Right fourth toe partial amputation for treatment of osteomyelitis.;  Surgeon: Jack Younger DPM;  Location: [facility]     AMPUTATE TOE(S) Left 11/8/2019    Procedure: Left second toe amputation at the metatarsophalangeal joint.;  Surgeon: Rachelle Manriquez DPM, Podiatry/Foot and Ankle Surgery;  Location: [facility]     ANGIOGRAM       COLONOSCOPY       COMBINED CYSTOSCOPY, RETROGRADES, URETEROSCOPY, INSERT STENT Left 8/21/2016    Procedure: COMBINED CYSTOSCOPY, RETROGRADES, URETEROSCOPY, INSERT STENT;  Surgeon: Artemio Valenzuela MD;  Location: [facility]     COMBINED CYSTOSCOPY, RETROGRADES, URETEROSCOPY, LASER HOLMIUM LITHOTRIPSY URETER(S), INSERT STENT Left  10/3/2016    Procedure: COMBINED CYSTOSCOPY, RETROGRADES, URETEROSCOPY, LASER HOLMIUM LITHOTRIPSY URETER(S), INSERT STENT;  Surgeon: Artemio Valenzuela MD;  Location: [facility]     EXTRACORPOREAL SHOCK WAVE LITHOTRIPSY (ESWL) Left 9/8/2016    Procedure: EXTRACORPOREAL SHOCK WAVE LITHOTRIPSY (ESWL);  Surgeon: Artemio Valenzuela MD;  Location: [facility] OR     RECESSION GASTROCNEMIUS Right 2/1/2016    Procedure: RECESSION GASTROCNEMIUS;  Surgeon: Rachelle Manriquez, DPM, Pod;  Location: [facility]   *  *  Current Outpatient Medications   Medication Sig Dispense Refill     amLODIPine (NORVASC) 5 MG tablet Take one tablet twice daily  SEE MD THIS QUARTER 180 tablet 0     amoxicillin-clavulanate (AUGMENTIN) 875-125 MG tablet Take 1 tablet by mouth 2 times daily 20 tablet 0     aspirin (ASA) 325 MG tablet Take 325 mg by mouth daily       atorvastatin (LIPITOR) 40 MG tablet Take one tablet daily SEE PROVIDER FOR NEXT REFILL 90 tablet 0     blood glucose (NO BRAND SPECIFIED) lancets standard Use to test blood sugar 3 times daily or as directed. 300 each 3     blood glucose monitoring (NO BRAND SPECIFIED) meter device kit Use to test blood sugar 3 times daily or as directed. 1 kit 0     blood glucose monitoring (NO BRAND SPECIFIED) test strip Use to test blood sugars 3 times daily or as directed 300 strip 3     Cholecalciferol (VITAMIN D3 PO) Take 1,000 Units by mouth daily        Co-Enzyme Q10 100 MG CAPS Take 100 mg by mouth daily        Continuous Blood Gluc Sensor (FREESTYLE LUCERO 14 DAY SENSOR) [facility] USE ONE EVERY 14 DAYS 2 each 3     furosemide (LASIX) 20 MG tablet Take 1 tablet (20 mg) by mouth daily As needed for edema 30 tablet 1     gabapentin (NEURONTIN) 100 MG capsule Take 300 mg by mouth 3 times daily       hydrochlorothiazide (MICROZIDE) 12.5 MG capsule Take 1 capsule (12.5 mg) by mouth every morning 90 capsule 1     Insulin Aspart FlexPen 100 UNIT/ML SOPN INJECT AS DIRECTED PER SLIDING SCALE BEFORE EACH MEAL. MAX 70  "UNITS DAILY 15 mL 1     insulin degludec (TRESIBA FLEXTOUCH) 100 UNIT/ML pen 45-55 units subcutaneous daily 60 mL 11     Insulin Pen Needle (PEN NEEDLES) 31G X 5 MM MISC 1 Device 4 times daily 200 each 10     ketoconazole (NIZORAL) 2 % external shampoo Apply topically daily as needed for itching or irritation See MD after 6 weeks 120 mL 1     lisinopril (ZESTRIL) 40 MG tablet TAKE 1 TABLET(40 MG) BY MOUTH DAILY 90 tablet 0     MAGNESIUM GLYCINATE PLUS PO Take 400 mg by mouth 2 times daily       metFORMIN (GLUCOPHAGE-XR) 500 MG 24 hr tablet TAKE 2 TABLETS BY MOUTH TWICE DAILY WITH MEALS 360 tablet 3     Multiple Vitamins-Minerals (MULTIVITAMIN PO) Take 1 tablet by mouth daily        Omega-3 Fatty Acids (OMEGA-3 FISH OIL PO) Take 1 g by mouth 2 times daily (with meals)        omeprazole (PRILOSEC) 40 MG DR capsule        potassium chloride ER (KLOR-CON M) 10 MEQ CR tablet Take 1 tablet (10 mEq) by mouth 2 times daily 5 tablet 3     primidone (MYSOLINE) 50 MG tablet TAKE 1 TABLET BY MOUTH THREE TIMES DAILY       semaglutide (OZEMPIC, 1 MG/DOSE,) 2 MG/1.5ML pen Inject 1 mg Subcutaneous every 7 days , on Wednesdays 6 mL 5     Semaglutide, 1 MG/DOSE, 4 MG/3ML SOPN Inject 1 mg Subcutaneous every 7 days on Wednesdays 3 mL 5     silver sulfADIAZINE (SILVADENE) 1 % external cream Apply topically daily 50 g 1     sulfamethoxazole-trimethoprim (BACTRIM DS) 800-160 MG tablet Take 1 tablet by mouth 2 times daily 20 tablet 0     tadalafil (CIALIS) 5 MG tablet TAKE 1 TABLET BY MOUTH EVERY DAY AS NEEDED one hour before intercourse 30 tablet 1     Glucagon, rDNA, (GLUCAGON EMERGENCY) 1 MG KIT Inject 1 mg into the muscle once for 1 dose 1 kit 0         EXAM:    Vitals: Ht 1.816 m (5' 11.5\")   Wt 110.2 kg (243 lb)   BMI 33.42 kg/m    BMI: Body mass index is 33.42 kg/m .    Vascular:  Pedal pulses are palpable for both the DP and PT arteries.  CFT < 3 sec.  No edema.       Neuro: Light touch sensation is grossly diminished, bilateral " foot, to the L4, L5, S1 distributions  No evidence of weakness, spasticity, or contracture in the lower extremities.      Derm:   1) Right hallux:  The ulceration is healed.  Dry scabbed over excoriation on the dorsal aspect of the toe he associates to rubbing in shoes while using the crest pad.     2) Right  2nd toe:  The previous dorsal ulceration is healed.  Thick hyperkeratotic skin with underlying ulceration to the depth of deeper skin distal right second toe.  Interval reduction of edema. No erythmea.     3) left second toe: Previous ulceration is healed.    4) Left plantar medial first metatarsal head: Thick hyperkeratotic lesion without evidence of epidermal breakdown.    Musculoskeletal: Previous amputation of the left hallux.  Previous partial amputation of the right hallux. Remaining lesser digits are contracted.  The bilateral second toes are rigid.       RIGHT TOE TWO OR MORE VIEWS  10/21/2021 8:32 AM      HISTORY:  Ulcer and swelling, right second toe. Ulcer of toe, right,  with fat layer exposed (H).     COMPARISON: None.                                                                      IMPRESSION: Amputation distal phalanx great toe. Amputation middle and  distal phalanx fourth toe. No definite lytic or destructive changes  second toe.     KAVON GILLESPIE MD

## 2021-11-12 NOTE — LETTER
11/12/2021         RE: Crispin Rojas  [id number]  [place] [address]        Dear Colleague,    Thank you for referring your patient, Crispin Rojas, to the [facility]. Please see a copy of my visit note below.    ASSESSMENT:  Encounter Diagnoses   Name Primary?     Ulcer of right foot, limited to breakdown of skin (H) Yes     Type 2 diabetes mellitus with peripheral neuropathy (H)      Hammer toes of both feet      Status post amputation of toe (H)    Both feet are stable without clinical signs of infection.  Prior wounds have healed with the exception of a superficial wound at the tip of the right second toe.    MEDICAL DECISION MAKING:  Ongoing use of a crest pad and other padding at his discretion.  The crest pad helps the right second toe but elevated the residual hallux causing excoriation dorsally.    He requests a prescription for Santyl ointment.  This is prescribed.    We will continue with basic wound cares: Daily cleansing, application of a topical antibiotic or Santyl, and a dressing.    He reports that he will continue working at the [employer] facility some capacity over the winter.    I reviewed the option of elective surgery to address his hammertoes.  He is rigidly contracted toes put him at high risk for ulceration and toe loss.  He will consider this.    Using #15 scalpel, the thick hyperkeratotic lesion, plantar medial left forefoot, was trimmed down to healthy underlying epithelium.  No bleeding.    Using a #15 scalpel, the distal right second toe ulceration was debrided.  The hyperkeratotic eschar was removed.  The underlying ulcer was scraped to remove bioburden and hypoxic bleeding.  This ulcer is down to the depth of deeper skin.  It was cleansed with an alcohol wipe and a dressing applied.    I encouraged him to follow-up prior to December 5, when he will be leaving town.    Disclaimer: This note consists of symbols derived from keyboarding, dictation  and/or voice recognition software. As a result, there may be errors in the script that have gone undetected. Please consider this when interpreting information found in this chart.    Valentino Molina DPM, FACGAEL, MS    [facility] Department of Podiatry/Foot & Ankle Surgery      ____________________________________________________________________    HPI:         Follows up for an ulceration of the right hallux and right second toe.    Due to edema and some mild erythema of the second toe, he was placed on Augmentin at his last visit on 10/21/2021.  A right foot x-ray was done and did not show any definite lytic or destructive changes in the second toe.  Daily wound cares were reviewed.  He has type 2 diabetes and peripheral neuropathy.  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  Past Medical History:   Diagnosis Date     Cerebral infarction (H)      Chronic infection      History of heartburn      Hyperlipidemia LDL goal <70      Hypertension      Numbness and tingling      Obesity      GILA (obstructive sleep apnea) 10/22/2018     Renal stone      Type II or unspecified type diabetes mellitus without mention of complication, not stated as uncontrolled    *  *  Past Surgical History:   Procedure Laterality Date     AMPUTATE FOOT Left 8/18/2016    Procedure: AMPUTATE FOOT;  Surgeon: Jack Younger DPM;  [facility]     AMPUTATE TOE(S) Right 2/1/2016    Procedure: AMPUTATE TOE(S);  Surgeon: Rachelle Manriquez DPM, Pod;  [facility]: [facility]     AMPUTATE TOE(S) Right 8/2/2019    Procedure: Right fourth toe partial amputation for treatment of osteomyelitis.;  Surgeon: Jack Younger DPM;  Location: [facility]     AMPUTATE TOE(S) Left 11/8/2019    Procedure: Left second toe amputation at the metatarsophalangeal joint.;  Surgeon: Rachelle Manriquez DPM, Podiatry/Foot and Ankle Surgery;  Location: [facility]     ANGIOGRAM       COLONOSCOPY       COMBINED CYSTOSCOPY, RETROGRADES, URETEROSCOPY, INSERT STENT  Left 8/21/2016    Procedure: COMBINED CYSTOSCOPY, RETROGRADES, URETEROSCOPY, INSERT STENT;  Surgeon: Artemio Valenzuela MD;  Location: [facility]     COMBINED CYSTOSCOPY, RETROGRADES, URETEROSCOPY, LASER HOLMIUM LITHOTRIPSY URETER(S), INSERT STENT Left 10/3/2016    Procedure: COMBINED CYSTOSCOPY, RETROGRADES, URETEROSCOPY, LASER HOLMIUM LITHOTRIPSY URETER(S), INSERT STENT;  Surgeon: Artemio Valenzuela MD;  [facility]: [facility]     EXTRACORPOREAL SHOCK WAVE LITHOTRIPSY (ESWL) Left 9/8/2016    Procedure: EXTRACORPOREAL SHOCK WAVE LITHOTRIPSY (ESWL);  Surgeon: Artemio Valenzuela MD;  [facility]     RECESSION GASTROCNEMIUS Right 2/1/2016    Procedure: RECESSION GASTROCNEMIUS;  Surgeon: Rachelle Manriquez DPM, Pod;  Location: [facility]   *  *  Current Outpatient Medications   Medication Sig Dispense Refill     amLODIPine (NORVASC) 5 MG tablet Take one tablet twice daily  SEE MD THIS QUARTER 180 tablet 0     amoxicillin-clavulanate (AUGMENTIN) 875-125 MG tablet Take 1 tablet by mouth 2 times daily 20 tablet 0     aspirin (ASA) 325 MG tablet Take 325 mg by mouth daily       atorvastatin (LIPITOR) 40 MG tablet Take one tablet daily SEE PROVIDER FOR NEXT REFILL 90 tablet 0     blood glucose (NO BRAND SPECIFIED) lancets standard Use to test blood sugar 3 times daily or as directed. 300 each 3     blood glucose monitoring (NO BRAND SPECIFIED) meter device kit Use to test blood sugar 3 times daily or as directed. 1 kit 0     blood glucose monitoring (NO BRAND SPECIFIED) test strip Use to test blood sugars 3 times daily or as directed 300 strip 3     Cholecalciferol (VITAMIN D3 PO) Take 1,000 Units by mouth daily        Co-Enzyme Q10 100 MG CAPS Take 100 mg by mouth daily        Continuous Blood Gluc Sensor (FREESTYLE LUCERO 14 DAY SENSOR) MISC USE ONE EVERY 14 DAYS 2 each 3     furosemide (LASIX) 20 MG tablet Take 1 tablet (20 mg) by mouth daily As needed for edema 30 tablet 1     gabapentin (NEURONTIN) 100 MG capsule Take  300 mg by mouth 3 times daily       hydrochlorothiazide (MICROZIDE) 12.5 MG capsule Take 1 capsule (12.5 mg) by mouth every morning 90 capsule 1     Insulin Aspart FlexPen 100 UNIT/ML SOPN INJECT AS DIRECTED PER SLIDING SCALE BEFORE EACH MEAL. MAX 70 UNITS DAILY 15 mL 1     insulin degludec (TRESIBA FLEXTOUCH) 100 UNIT/ML pen 45-55 units subcutaneous daily 60 mL 11     Insulin Pen Needle (PEN NEEDLES) 31G X 5 MM MISC 1 Device 4 times daily 200 each 10     ketoconazole (NIZORAL) 2 % external shampoo Apply topically daily as needed for itching or irritation See MD after 6 weeks 120 mL 1     lisinopril (ZESTRIL) 40 MG tablet TAKE 1 TABLET(40 MG) BY MOUTH DAILY 90 tablet 0     MAGNESIUM GLYCINATE PLUS PO Take 400 mg by mouth 2 times daily       metFORMIN (GLUCOPHAGE-XR) 500 MG 24 hr tablet TAKE 2 TABLETS BY MOUTH TWICE DAILY WITH MEALS 360 tablet 3     Multiple Vitamins-Minerals (MULTIVITAMIN PO) Take 1 tablet by mouth daily        Omega-3 Fatty Acids (OMEGA-3 FISH OIL PO) Take 1 g by mouth 2 times daily (with meals)        omeprazole (PRILOSEC) 40 MG DR capsule        potassium chloride ER (KLOR-CON M) 10 MEQ CR tablet Take 1 tablet (10 mEq) by mouth 2 times daily 5 tablet 3     primidone (MYSOLINE) 50 MG tablet TAKE 1 TABLET BY MOUTH THREE TIMES DAILY       semaglutide (OZEMPIC, 1 MG/DOSE,) 2 MG/1.5ML pen Inject 1 mg Subcutaneous every 7 days , on Wednesdays 6 mL 5     Semaglutide, 1 MG/DOSE, 4 MG/3ML SOPN Inject 1 mg Subcutaneous every 7 days on Wednesdays 3 mL 5     silver sulfADIAZINE (SILVADENE) 1 % external cream Apply topically daily 50 g 1     sulfamethoxazole-trimethoprim (BACTRIM DS) 800-160 MG tablet Take 1 tablet by mouth 2 times daily 20 tablet 0     tadalafil (CIALIS) 5 MG tablet TAKE 1 TABLET BY MOUTH EVERY DAY AS NEEDED one hour before intercourse 30 tablet 1     Glucagon, rDNA, (GLUCAGON EMERGENCY) 1 MG KIT Inject 1 mg into the muscle once for 1 dose 1 kit 0         EXAM:    Vitals: Ht 1.816 m (5'  "11.5\")   Wt 110.2 kg (243 lb)   BMI 33.42 kg/m    BMI: Body mass index is 33.42 kg/m .    Vascular:  Pedal pulses are palpable for both the DP and PT arteries.  CFT < 3 sec.  No edema.       Neuro: Light touch sensation is grossly diminished, bilateral foot, to the L4, L5, S1 distributions  No evidence of weakness, spasticity, or contracture in the lower extremities.      Derm:   1) Right hallux:  The ulceration is healed.  Dry scabbed over excoriation on the dorsal aspect of the toe he associates to rubbing in shoes while using the crest pad.     2) Right  2nd toe:  The previous dorsal ulceration is healed.  Thick hyperkeratotic skin with underlying ulceration to the depth of deeper skin distal right second toe.  Interval reduction of edema. No erythmea.     3) left second toe: Previous ulceration is healed.    4) Left plantar medial first metatarsal head: Thick hyperkeratotic lesion without evidence of epidermal breakdown.    Musculoskeletal: Previous amputation of the left hallux.  Previous partial amputation of the right hallux. Remaining lesser digits are contracted.  The bilateral second toes are rigid.       RIGHT TOE TWO OR MORE VIEWS  10/21/2021 8:32 AM      HISTORY:  Ulcer and swelling, right second toe. Ulcer of toe, right,  with fat layer exposed (H).     COMPARISON: None.                                                                      IMPRESSION: Amputation distal phalanx great toe. Amputation middle and  distal phalanx fourth toe. No definite lytic or destructive changes  second toe.     KAVON GILLESPIE MD                   Again, thank you for allowing me to participate in the care of your patient.        Sincerely,        PRAFUL KelleyM    "

## 2021-11-16 ENCOUNTER — TELEPHONE (OUTPATIENT)
Dept: PODIATRY | Facility: CLINIC | Age: 61
End: 2021-11-16
Payer: COMMERCIAL

## 2021-11-16 NOTE — TELEPHONE ENCOUNTER
[facility] Call [facility]    Phone Message    May a detailed message be left on voicemail: yes     Reason for Call: Other: Marilia is calling to relay the Santyl Ointment is not covered by pt's insurance and they would like to know if Jesse wants to do Prior Auth on it , and if that's a yes they need to know the Wound Size      Action Taken: Other: [facility] POD    Travel Screening: Not Applicable     Please call Pharmacy back to clarify

## 2021-11-17 ENCOUNTER — TRANSFERRED RECORDS (OUTPATIENT)
Dept: SURGERY | Facility: CLINIC | Age: 61
End: 2021-11-17
Payer: COMMERCIAL

## 2021-11-17 NOTE — TELEPHONE ENCOUNTER
I called Pat. No answer. I left a message for Pat.  I believe he said that he would pay out of pocket for the Santyl.  Nonetheless, I mentioned that we could try for a prior authorization.  However, all his wounds were healed when I evaluated him.  No wound size to submit (at this time) for prior authorization.    I asked Pat to reach out to us via [employer] with his thoughts.    Dr. Molina

## 2021-11-18 ENCOUNTER — TELEPHONE (OUTPATIENT)
Dept: ENDOCRINOLOGY | Facility: CLINIC | Age: 61
End: 2021-11-18
Payer: COMMERCIAL

## 2021-11-18 DIAGNOSIS — E11.42 TYPE 2 DIABETES MELLITUS WITH PERIPHERAL NEUROPATHY (H): Primary | ICD-10-CM

## 2021-11-18 NOTE — TELEPHONE ENCOUNTER
[facility] Call [facility]    Phone Message    May a detailed message be left on voicemail: yes     Reason for Call: Order(s): Other:   Reason for requested: DiabetesPatient would like a call back to discuss what labs he needs to get for upcoming lab appointment 1/3/2022 at [facility]  also requesting that the orders get sent there.   Date needed: asap   Provider name: HO Montanez       Action Taken: Other: Endo    Travel Screening: Not Applicable

## 2021-11-24 ENCOUNTER — TRANSFERRED RECORDS (OUTPATIENT)
Dept: SURGERY | Facility: CLINIC | Age: 61
End: 2021-11-24
Payer: COMMERCIAL

## 2021-11-24 ENCOUNTER — TRANSFERRED RECORDS (OUTPATIENT)
Dept: HEALTH INFORMATION MANAGEMENT | Facility: CLINIC | Age: 61
End: 2021-11-24
Payer: COMMERCIAL

## 2021-11-30 ENCOUNTER — VIRTUAL VISIT (OUTPATIENT)
Dept: FAMILY MEDICINE | Facility: CLINIC | Age: 61
End: 2021-11-30
Payer: COMMERCIAL

## 2021-11-30 DIAGNOSIS — R09.81 STUFFY NOSE: Primary | ICD-10-CM

## 2021-11-30 PROCEDURE — 99213 OFFICE O/P EST LOW 20 MIN: CPT | Mod: 95 | Performed by: INTERNAL MEDICINE

## 2021-11-30 NOTE — PROGRESS NOTES
Judie is a 61 year old who is being evaluated via a billable telephone visit.      What phone number would you like to be contacted at? [phone]   [facility]    Assessment & Plan     Diagnoses and all orders for this visit:    Stuffy nose  -     Symptomatic COVID-19 Virus (Coronavirus) by PCR; Future         Return for as needed.    Betsy Badillo MD PhD  [facility]   Judie is a 61 year old who presents for the following health issues     HPI   Started cold symptoms last night. No fever, stuffy nose.   Plans to travel this Sunday flying.     He had covid infection in August 2020. He has also had covid-19 vaccines 2 doses.   He didn't think what he has is covid but wanted to rule out so he won't pass anything to other.        Review of Systems   Constitutional, HEENT, cardiovascular, pulmonary, gi and gu systems are negative, except as otherwise noted.      Objective           Vitals:  No vitals were obtained today due to virtual visit.    Physical Exam   healthy, alert and no distress  PSYCH: Alert and oriented times 3; coherent speech, normal   rate and volume, able to articulate logical thoughts, able   to abstract reason, no tangential thoughts, no hallucinations   or delusions  His affect is normal  RESP: No cough, no audible wheezing, able to talk in full sentences  Remainder of exam unable to be completed due to telephone visits      Phone call duration: 4 minutes

## 2021-12-01 ENCOUNTER — LAB (OUTPATIENT)
Dept: URGENT CARE | Facility: URGENT CARE | Age: 61
End: 2021-12-01
Attending: INTERNAL MEDICINE
Payer: COMMERCIAL

## 2021-12-01 DIAGNOSIS — R09.81 STUFFY NOSE: ICD-10-CM

## 2021-12-01 PROCEDURE — U0003 INFECTIOUS AGENT DETECTION BY NUCLEIC ACID (DNA OR RNA); SEVERE ACUTE RESPIRATORY SYNDROME CORONAVIRUS 2 (SARS-COV-2) (CORONAVIRUS DISEASE [COVID-19]), AMPLIFIED PROBE TECHNIQUE, MAKING USE OF HIGH THROUGHPUT TECHNOLOGIES AS DESCRIBED BY CMS-2020-01-R: HCPCS

## 2021-12-01 PROCEDURE — U0005 INFEC AGEN DETEC AMPLI PROBE: HCPCS

## 2021-12-02 LAB — SARS-COV-2 RNA RESP QL NAA+PROBE: NEGATIVE

## 2021-12-03 ENCOUNTER — TRANSFERRED RECORDS (OUTPATIENT)
Dept: SURGERY | Facility: CLINIC | Age: 61
End: 2021-12-03
Payer: COMMERCIAL

## 2021-12-03 NOTE — RESULT ENCOUNTER NOTE
Dear Pat,   Your recent test result are within acceptable range or at baseline. Please continue with your current plan of care.       Please call or Mychart to our office if you have further questions.     Betsy Badillo MD-PhD

## 2021-12-18 ENCOUNTER — HEALTH MAINTENANCE LETTER (OUTPATIENT)
Age: 61
End: 2021-12-18

## 2021-12-21 NOTE — PROGRESS NOTES
"Judie is a 61 year old who is being evaluated via a billable video visit.      How would you like to obtain your AVS? MyChart  If the video visit is dropped, the invitation should be resent by: Text to cell phone: [phone]  Will anyone else be joining your video visit? No      Video Start Time:0730    [facility] ENDOCRINOLOGY    Diabetes Note 1/7/2022    Crispin Rojas, 1960, [id number]          Reason for visit      1. Type 2 diabetes mellitus with peripheral neuropathy (H)        HPI     Crispin Rojas is a very pleasant 61 year old old male who presents for follow up.  SUMMARY:    Judie is contacted today via Video Visit in f/u for DM 2. His current A1c is 6.1 and a little surprising to him as he had \"about a week and a half where I wasn't eating like I should be\". He was in [place] and at the mercy of those who were cooking for him. His most recent Corwin download shows that he was within range 60% of the time over the last 14 days. He did have some hypoglycemia that was registered on his download, but he reports that he never felt low.  Of note, he injured his foot a couple of days ago while going up and down a ladder in bare feet. He states that he didn't feel it, but found that it was bleeding. He was seen by his Podiatrist yesterday who put him in a boot just to protect it. He reports that it is healing. He has 8 stitches. He is taking 45-55 units of Tresiba daily and takes up to 70 units of Novolog daily. He is also on Ozempic, 1 mg weekly and 1000 mg of Metformin BID.      Blood glucose data:      Past Medical History     Patient Active Problem List   Diagnosis     Morbid obesity, unspecified obesity type (H)     Calculus of kidney     Chronic left shoulder pain     Cervicalgia     Type 2 diabetes mellitus with peripheral neuropathy (H)     Hyperlipidemia LDL goal <70     Essential hypertension     Right bundle branch block     GILA (obstructive sleep apnea)     Thoracic aortic aneurysm without " rupture (H)     Diabetic ulcer of lower extremity (H)     PVD (peripheral vascular disease) (H)     Scoliosis of thoracic spine, unspecified scoliosis type     Other sequelae following unspecified cerebrovascular disease        Family History       family history includes Arthritis in his mother; Cancer in his mother; Cerebrovascular Disease in his mother; Connective Tissue Disorder in his mother; Diabetes in his father, maternal grandfather, mother, and sister.    Social History      reports that he has never smoked. He has never used smokeless tobacco. He reports current alcohol use. He reports that he does not use drugs.      Review of Systems     Patient has no polyuria or polydipsia, no chest pain, dyspnea or TIA's, no numbness, tingling or pain in extremities  Remainder negative except as noted in HPI.      Vital Signs     There were no vitals taken for this visit.  Wt Readings from Last 3 Encounters:   01/06/22 110.7 kg (244 lb)   01/05/22 110.7 kg (244 lb)   01/03/22 110.7 kg (244 lb)       Physical Exam     Constitutional:  Well developed, Well nourished  HENT:  Normocephalic,   Neck: normal in appearance  Eyes:  PERRL, Conjunctiva pink  Respiratory:  No respiratory distress  Skin: No acanthosis nigricans, lipoatrophy or lipodystrophy  Neurologic:  Alert & oriented x 3, nonfocal  Psychiatric:  Affect, Mood, Insight appropriate          Assessment     1. Type 2 diabetes mellitus with peripheral neuropathy (H)        Plan     Pat is currently stable on his medication regimen. No changes warranted today. F/u with me in 6 months.       Shira Montanez NP  [facility]  1/7/2022  8:56 AM      Lab Results     No results found for: HGBA1C, CREATININE, MICROALBUR    Cholesterol   Date Value Ref Range Status   06/22/2021 95 <200 mg/dL Final     HDL Cholesterol   Date Value Ref Range Status   06/22/2021 42 >39 mg/dL Final     Triglycerides   Date Value Ref Range Status   06/22/2021 42 <150 mg/dL Final     Comment:      Fasting specimen             Current Medications     Outpatient Medications Prior to Visit   Medication Sig Dispense Refill     amLODIPine (NORVASC) 5 MG tablet Take one tablet twice daily  SEE MD THIS QUARTER 180 tablet 1     aspirin (ASA) 325 MG tablet Take 325 mg by mouth daily       atorvastatin (LIPITOR) 40 MG tablet Take one tablet daily SEE PROVIDER FOR NEXT REFILL 90 tablet 0     blood glucose (NO BRAND SPECIFIED) lancets standard Use to test blood sugar 3 times daily or as directed. 300 each 3     blood glucose monitoring (NO BRAND SPECIFIED) meter device kit Use to test blood sugar 3 times daily or as directed. 1 kit 0     blood glucose monitoring (NO BRAND SPECIFIED) test strip Use to test blood sugars 3 times daily or as directed 300 strip 3     Cholecalciferol (VITAMIN D3 PO) Take 1,000 Units by mouth daily        Co-Enzyme Q10 100 MG CAPS Take 100 mg by mouth daily        collagenase (SANTYL) 250 UNIT/GM external ointment Apply topically daily 30 g 1     Continuous Blood Gluc Sensor (FREESTYLE LUCERO 14 DAY SENSOR) MIS USE ONE EVERY 14 DAYS 2 each 3     furosemide (LASIX) 20 MG tablet Take 1 tablet (20 mg) by mouth daily as needed (for leg swelling) As needed for edema 30 tablet 1     gabapentin (NEURONTIN) 100 MG capsule Take 300 mg by mouth 3 times daily       hydrochlorothiazide (MICROZIDE) 12.5 MG capsule Take 1 capsule (12.5 mg) by mouth every morning 90 capsule 1     Insulin Aspart FlexPen 100 UNIT/ML SOPN INJECT AS DIRECTED PER SLIDING SCALE BEFORE EACH MEAL. MAX 70 UNITS DAILY 15 mL 0     insulin degludec (TRESIBA FLEXTOUCH) 100 UNIT/ML pen 45-55 units subcutaneous daily 60 mL 11     Insulin Pen Needle (PEN NEEDLES) 31G X 5 MM MISC 1 Device 4 times daily 200 each 10     ketoconazole (NIZORAL) 2 % external shampoo APPLY TOPICALLY DAILY AS NEEDED FOR ITCHING OR IRRITATION. SEE MD AFTER 6 WEEKS. 120 mL 1     lisinopril (ZESTRIL) 40 MG tablet TAKE 1 TABLET(40 MG) BY MOUTH DAILY 90 tablet 0      MAGNESIUM GLYCINATE PLUS PO Take 400 mg by mouth 2 times daily       metFORMIN (GLUCOPHAGE-XR) 500 MG 24 hr tablet TAKE 2 TABLETS BY MOUTH TWICE DAILY WITH MEALS 360 tablet 3     Multiple Vitamins-Minerals (MULTIVITAMIN PO) Take 1 tablet by mouth daily        Omega-3 Fatty Acids (OMEGA-3 FISH OIL PO) Take 1 g by mouth 2 times daily (with meals)        omeprazole (PRILOSEC) 40 MG DR capsule        potassium chloride ER (KLOR-CON M) 10 MEQ CR tablet Take 1 tablet (10 mEq) by mouth 2 times daily 5 tablet 3     primidone (MYSOLINE) 50 MG tablet TAKE 1 TABLET BY MOUTH THREE TIMES DAILY       semaglutide (OZEMPIC, 1 MG/DOSE,) 2 MG/1.5ML pen Inject 1 mg Subcutaneous every 7 days , on Wednesdays 6 mL 5     silver sulfADIAZINE (SILVADENE) 1 % external cream Apply topically daily 50 g 1     tadalafil (CIALIS) 5 MG tablet TAKE 1 TABLET BY MOUTH EVERY DAY AS NEEDED one hour before intercourse 30 tablet 1     Glucagon, rDNA, (GLUCAGON EMERGENCY) 1 MG KIT Inject 1 mg into the muscle once for 1 dose 1 kit 0     No facility-administered medications prior to visit.           Video-Visit Details    Type of service:  Video Visit    Video End Time:0755    Originating Location (pt. Location): Home    Distant Location (provider location):  [facility]     Platform used for Video Visit: Jennifer    Date of last OV: 6/29/21  Reason for Visit: DM      Blood Glucose Log:

## 2021-12-22 ENCOUNTER — OFFICE VISIT (OUTPATIENT)
Dept: PODIATRY | Facility: CLINIC | Age: 61
End: 2021-12-22
Payer: COMMERCIAL

## 2021-12-22 ENCOUNTER — HOSPITAL ENCOUNTER (OUTPATIENT)
Dept: CARDIOLOGY | Facility: CLINIC | Age: 61
Discharge: HOME | End: 2021-12-22
Attending: INTERNAL MEDICINE | Admitting: INTERNAL MEDICINE
Payer: COMMERCIAL

## 2021-12-22 VITALS
HEIGHT: 71 IN | DIASTOLIC BLOOD PRESSURE: 80 MMHG | WEIGHT: 236 LBS | SYSTOLIC BLOOD PRESSURE: 122 MMHG | BODY MASS INDEX: 33.04 KG/M2

## 2021-12-22 DIAGNOSIS — E78.5 HYPERLIPIDEMIA LDL GOAL <70: ICD-10-CM

## 2021-12-22 DIAGNOSIS — I10 BENIGN ESSENTIAL HYPERTENSION: ICD-10-CM

## 2021-12-22 DIAGNOSIS — Z86.73 HISTORY OF STROKE: ICD-10-CM

## 2021-12-22 DIAGNOSIS — I45.10 RIGHT BUNDLE BRANCH BLOCK: ICD-10-CM

## 2021-12-22 DIAGNOSIS — Z79.4 TYPE 2 DIABETES MELLITUS WITH DIABETIC POLYNEUROPATHY, WITH LONG-TERM CURRENT USE OF INSULIN (H): ICD-10-CM

## 2021-12-22 DIAGNOSIS — B35.0 TINEA CAPITIS DUE TO TRICHOPHYTON: ICD-10-CM

## 2021-12-22 DIAGNOSIS — R23.4 FISSURE IN SKIN OF FOOT: ICD-10-CM

## 2021-12-22 DIAGNOSIS — E11.42 TYPE 2 DIABETES MELLITUS WITH DIABETIC POLYNEUROPATHY, WITH LONG-TERM CURRENT USE OF INSULIN (H): ICD-10-CM

## 2021-12-22 DIAGNOSIS — I77.810 MILD ASCENDING AORTA DILATATION (H): ICD-10-CM

## 2021-12-22 DIAGNOSIS — Z78.9 INTOLERANCE OF CONTINUOUS POSITIVE AIRWAY PRESSURE (CPAP) VENTILATION: ICD-10-CM

## 2021-12-22 DIAGNOSIS — L84 CALLUS OF FOOT: ICD-10-CM

## 2021-12-22 DIAGNOSIS — E11.42 TYPE 2 DIABETES MELLITUS WITH PERIPHERAL NEUROPATHY (H): ICD-10-CM

## 2021-12-22 DIAGNOSIS — L97.511 SKIN ULCER OF TOE OF RIGHT FOOT, LIMITED TO BREAKDOWN OF SKIN (H): Primary | ICD-10-CM

## 2021-12-22 DIAGNOSIS — Z89.429 STATUS POST AMPUTATION OF TOE (H): ICD-10-CM

## 2021-12-22 DIAGNOSIS — G47.33 OSA (OBSTRUCTIVE SLEEP APNEA): ICD-10-CM

## 2021-12-22 LAB — LVEF ECHO: NORMAL

## 2021-12-22 PROCEDURE — 97597 DBRDMT OPN WND 1ST 20 CM/<: CPT | Mod: 59 | Performed by: PODIATRIST

## 2021-12-22 PROCEDURE — 11055 PARING/CUTG B9 HYPRKER LES 1: CPT | Mod: 51 | Performed by: PODIATRIST

## 2021-12-22 PROCEDURE — 93306 TTE W/DOPPLER COMPLETE: CPT

## 2021-12-22 PROCEDURE — 93306 TTE W/DOPPLER COMPLETE: CPT | Mod: 26 | Performed by: INTERNAL MEDICINE

## 2021-12-22 RX ORDER — KETOCONAZOLE 20 MG/ML
SHAMPOO TOPICAL DAILY PRN
Qty: 120 ML | Refills: 1 | Status: SHIPPED | OUTPATIENT
Start: 2021-12-22 | End: 2022-06-24

## 2021-12-22 ASSESSMENT — MIFFLIN-ST. JEOR: SCORE: 1907.62

## 2021-12-22 NOTE — TELEPHONE ENCOUNTER
Per notes in the medication directions pt was suppose to see PCP after 6 wks of treatment.  Javier SAAVEDRA RN, BSN

## 2021-12-22 NOTE — LETTER
"    12/22/2021         RE: Crispin Rojas  [id number] [facility] [id number]        Dear Colleague,    Thank you for referring your patient, Crispin Rojas, to the [facility] POD[facility]. Please see a copy of my visit note below.    ASSESSMENT:  Encounter Diagnoses   Name Primary?     Skin ulcer of toe of right foot, limited to breakdown of skin (H) Yes     Fissure in skin of foot      Callus of foot      Type 2 diabetes mellitus with peripheral neuropathy (H)      Status post amputation of toe (H)      MEDICAL DECISION MAKING:  No clinical signs of infection.  Excisional debridement of the right hallux and right second toe, see below.    Using #15 scalpel, the hyperkeratotic lesion on the left foot was excised, trimmed down.    Recommend that he clean the wounds daily, apply topical antibiotic and light dressing.  He admits he has not been doing this recently, yet has noted some bleeding.    Follow-up in 1 to 2 months, sooner if concerns.    Excisional Debridement    The excisional debridement procedure was discussed.  This included the goals of removing non-viable tissue, evaluating the full extent of wound, and promoting wound healing.  Crispin Rojas  provided verba consent.  The \"Time Out\" was called, confirming procedure and location.     Using a sterile #15 blade and tissue nippers, excisional debridment of the right hallux and second toe ulcer was performed. The hyperkeratotic eschar surrounding the wound was removed, by excising skin edges back to healthy, bleeding tissue. Non-viable tissue was excised.  Debridement was carried out to the depth of deeper skin. The ulcer base was scraped to remove bioburden and promote healing.  The area debrided was less than 20 square cm.   There was moderate bleeding with the procedure. No anesthesia was needed due to peripheral neuropathy.   A sterile dressing was applied.      Disclaimer: This note consists of symbols derived from keyboarding, " dictation and/or voice recognition software. As a result, there may be errors in the script that have gone undetected. Please consider this when interpreting information found in this chart.    Valentino Molina DPM, CLYDE, MS    [place] Department of Podiatry/Foot & Ankle Surgery      ____________________________________________________________________    HPI:       Follow up for ulceration, right hallux and second toe.  He spent two weeks in [place] playing golf.  Last evaluated in clinic on 11/12/21    He has type 2 diabetes and peripheral neuropathy.  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    Past Medical History:   Diagnosis Date     Cerebral infarction (H)      Chronic infection      History of heartburn      Hyperlipidemia LDL goal <70      Hypertension      Numbness and tingling      Obesity      GILA (obstructive sleep apnea) 10/22/2018     Renal stone      Type II or unspecified type diabetes mellitus without mention of complication, not stated as uncontrolled    *  *  Past Surgical History:   Procedure Laterality Date     AMPUTATE FOOT Left 8/18/2016    Procedure: AMPUTATE FOOT;  Surgeon: Jack Younger DPM;  Location: [facility] OR     AMPUTATE TOE(S) Right 2/1/2016    Procedure: AMPUTATE TOE(S);  Surgeon: Rachelle Manriquez DPM, Pod;  Location: [facility] OR     AMPUTATE TOE(S) Right 8/2/2019    Procedure: Right fourth toe partial amputation for treatment of osteomyelitis.;  Surgeon: Jack Younger DPM;  Location: [facility] OR     AMPUTATE TOE(S) Left 11/8/2019    Procedure: Left second toe amputation at the metatarsophalangeal joint.;  Surgeon: Rachelle Manriquez DPM, Podiatry/Foot and Ankle Surgery;  Location:  OR     ANGIOGRAM       COLONOSCOPY       COMBINED CYSTOSCOPY, RETROGRADES, URETEROSCOPY, INSERT STENT Left 8/21/2016    Procedure: COMBINED CYSTOSCOPY, RETROGRADES, URETEROSCOPY, INSERT STENT;  Surgeon: Artemio Valenzuela MD;  Location:  OR     COMBINED CYSTOSCOPY,  RETROGRADES, URETEROSCOPY, LASER HOLMIUM LITHOTRIPSY URETER(S), INSERT STENT Left 10/3/2016    Procedure: COMBINED CYSTOSCOPY, RETROGRADES, URETEROSCOPY, LASER HOLMIUM LITHOTRIPSY URETER(S), INSERT STENT;  Surgeon: Artemio Valenzuela MD;  Location: [facility] OR     EXTRACORPOREAL SHOCK WAVE LITHOTRIPSY (ESWL) Left 9/8/2016    Procedure: EXTRACORPOREAL SHOCK WAVE LITHOTRIPSY (ESWL);  Surgeon: Artemio Valenzuela MD;  Location: [facility] OR     RECESSION GASTROCNEMIUS Right 2/1/2016    Procedure: RECESSION GASTROCNEMIUS;  Surgeon: Rachelle Manriquez, DPM, Pod;  Location: [facility] OR   *  *  Current Outpatient Medications   Medication Sig Dispense Refill     amLODIPine (NORVASC) 5 MG tablet Take one tablet twice daily  SEE MD THIS QUARTER 180 tablet 0     amoxicillin-clavulanate (AUGMENTIN) 875-125 MG tablet Take 1 tablet by mouth 2 times daily 20 tablet 0     aspirin (ASA) 325 MG tablet Take 325 mg by mouth daily       atorvastatin (LIPITOR) 40 MG tablet Take one tablet daily SEE PROVIDER FOR NEXT REFILL 90 tablet 0     blood glucose (NO BRAND SPECIFIED) lancets standard Use to test blood sugar 3 times daily or as directed. 300 each 3     blood glucose monitoring (NO BRAND SPECIFIED) meter device kit Use to test blood sugar 3 times daily or as directed. 1 kit 0     blood glucose monitoring (NO BRAND SPECIFIED) test strip Use to test blood sugars 3 times daily or as directed 300 strip 3     Cholecalciferol (VITAMIN D3 PO) Take 1,000 Units by mouth daily        Co-Enzyme Q10 100 MG CAPS Take 100 mg by mouth daily        collagenase (SANTYL) 250 UNIT/GM external ointment Apply topically daily 30 g 1     Continuous Blood Gluc Sensor (FREESTYLE LUCERO 14 DAY SENSOR) [facility] USE ONE EVERY 14 DAYS 2 each 3     furosemide (LASIX) 20 MG tablet Take 1 tablet (20 mg) by mouth daily As needed for edema 30 tablet 1     gabapentin (NEURONTIN) 100 MG capsule Take 300 mg by mouth 3 times daily       Glucagon, rDNA, (GLUCAGON EMERGENCY) 1 MG KIT Inject  "1 mg into the muscle once for 1 dose 1 kit 0     hydrochlorothiazide (MICROZIDE) 12.5 MG capsule Take 1 capsule (12.5 mg) by mouth every morning 90 capsule 1     Insulin Aspart FlexPen 100 UNIT/ML SOPN INJECT AS DIRECTED PER SLIDING SCALE BEFORE EACH MEAL. MAX 70 UNITS DAILY 15 mL 1     insulin degludec (TRESIBA FLEXTOUCH) 100 UNIT/ML pen 45-55 units subcutaneous daily 60 mL 11     Insulin Pen Needle (PEN NEEDLES) 31G X 5 MM MISC 1 Device 4 times daily 200 each 10     ketoconazole (NIZORAL) 2 % external shampoo Apply topically daily as needed for itching or irritation See MD after 6 weeks 120 mL 1     lisinopril (ZESTRIL) 40 MG tablet TAKE 1 TABLET(40 MG) BY MOUTH DAILY 90 tablet 0     MAGNESIUM GLYCINATE PLUS PO Take 400 mg by mouth 2 times daily       metFORMIN (GLUCOPHAGE-XR) 500 MG 24 hr tablet TAKE 2 TABLETS BY MOUTH TWICE DAILY WITH MEALS 360 tablet 3     Multiple Vitamins-Minerals (MULTIVITAMIN PO) Take 1 tablet by mouth daily        Omega-3 Fatty Acids (OMEGA-3 FISH OIL PO) Take 1 g by mouth 2 times daily (with meals)        omeprazole (PRILOSEC) 40 MG DR capsule        potassium chloride ER (KLOR-CON M) 10 MEQ CR tablet Take 1 tablet (10 mEq) by mouth 2 times daily 5 tablet 3     primidone (MYSOLINE) 50 MG tablet TAKE 1 TABLET BY MOUTH THREE TIMES DAILY       semaglutide (OZEMPIC, 1 MG/DOSE,) 2 MG/1.5ML pen Inject 1 mg Subcutaneous every 7 days , on Wednesdays 6 mL 5     Semaglutide, 1 MG/DOSE, 4 MG/3ML SOPN Inject 1 mg Subcutaneous every 7 days on Wednesdays 3 mL 5     silver sulfADIAZINE (SILVADENE) 1 % external cream Apply topically daily 50 g 1     sulfamethoxazole-trimethoprim (BACTRIM DS) 800-160 MG tablet Take 1 tablet by mouth 2 times daily 20 tablet 0     tadalafil (CIALIS) 5 MG tablet TAKE 1 TABLET BY MOUTH EVERY DAY AS NEEDED one hour before intercourse 30 tablet 1         EXAM:    Vitals: /80   Ht 1.803 m (5' 11\")   Wt 107 kg (236 lb)   BMI 32.92 kg/m    BMI: Body mass index is 32.92 " kg/m .    Vascular:  Pedal pulses are palpable for both the DP and PT arteries.  CFT < 3 sec.  No edema.       Neuro: Light touch sensation is grossly diminished, bilateral foot, to the L4, L5, S1 distributions  No evidence of weakness, spasticity, or contracture in the lower extremities.      Derm:   1) Right hallux:  Previous ulceration remaoins healed.    New problem: Hyperkeratotic skin on the plantar aspect with a fissure.     2) Right 2nd toe:   Thick hyperkeratotic skin with underlying ulceration to the depth of deeper skin distal right second toe.  No edema. No erythmea.      3) Left plantar medial first metatarsal head: Thick hyperkeratotic lesion without evidence of epidermal breakdown.     Musculoskeletal: Previous amputation of the left hallux.  Previous partial amputation of the right hallux. Remaining lesser digits are contracted.  The bilateral second toes are rigid.        Again, thank you for allowing me to participate in the care of your patient.        Sincerely,        Valentino Molina DPM

## 2021-12-22 NOTE — PROGRESS NOTES
"ASSESSMENT:  Encounter Diagnoses   Name Primary?     Skin ulcer of toe of right foot, limited to breakdown of skin (H) Yes     Fissure in skin of foot      Callus of foot      Type 2 diabetes mellitus with peripheral neuropathy (H)      Status post amputation of toe (H)      MEDICAL DECISION MAKING:  No clinical signs of infection.  Excisional debridement of the right hallux and right second toe, see below.    Using #15 scalpel, the hyperkeratotic lesion on the left foot was excised, trimmed down.    Recommend that he clean the wounds daily, apply topical antibiotic and light dressing.  He admits he has not been doing this recently, yet has noted some bleeding.    Follow-up in 1 to 2 months, sooner if concerns.    Excisional Debridement    The excisional debridement procedure was discussed.  This included the goals of removing non-viable tissue, evaluating the full extent of wound, and promoting wound healing.  Crispin Rojas  provided verba consent.  The \"Time Out\" was called, confirming procedure and location.     Using a sterile #15 blade and tissue nippers, excisional debridment of the right hallux and second toe ulcer was performed. The hyperkeratotic eschar surrounding the wound was removed, by excising skin edges back to healthy, bleeding tissue. Non-viable tissue was excised.  Debridement was carried out to the depth of deeper skin. The ulcer base was scraped to remove bioburden and promote healing.  The area debrided was less than 20 square cm.   There was moderate bleeding with the procedure. No anesthesia was needed due to peripheral neuropathy.   A sterile dressing was applied.      Disclaimer: This note consists of symbols derived from keyboarding, dictation and/or voice recognition software. As a result, there may be errors in the script that have gone undetected. Please consider this when interpreting information found in this chart.    Valentino Molina, SHRUTHI, FACFAS, MS    [place] [facility] of " Podiatry/Foot & Ankle Surgery      ____________________________________________________________________    HPI:       Follow up for ulceration, right hallux and second toe.  He spent two weeks in [place] playing golf.  Last evaluated in clinic on 11/12/21    He has type 2 diabetes and peripheral neuropathy.  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    Past Medical History:   Diagnosis Date     Cerebral infarction (H)      Chronic infection      History of heartburn      Hyperlipidemia LDL goal <70      Hypertension      Numbness and tingling      Obesity      GILA (obstructive sleep apnea) 10/22/2018     Renal stone      Type II or unspecified type diabetes mellitus without mention of complication, not stated as uncontrolled    *  *  Past Surgical History:   Procedure Laterality Date     AMPUTATE FOOT Left 8/18/2016    Procedure: AMPUTATE FOOT;  Surgeon: Jack Younger DPM;  Location: [facility] OR     AMPUTATE TOE(S) Right 2/1/2016    Procedure: AMPUTATE TOE(S);  Surgeon: Rachelle Manriquez DPM, Pod;  Location: [facility] OR     AMPUTATE TOE(S) Right 8/2/2019    Procedure: Right fourth toe partial amputation for treatment of osteomyelitis.;  Surgeon: Jack Younger DPM;  Location: [facility] OR     AMPUTATE TOE(S) Left 11/8/2019    Procedure: Left second toe amputation at the metatarsophalangeal joint.;  Surgeon: Rachelle Manriquez DPM, Podiatry/Foot and Ankle Surgery;  Location: [facility] OR     ANGIOGRAM       COLONOSCOPY       COMBINED CYSTOSCOPY, RETROGRADES, URETEROSCOPY, INSERT STENT Left 8/21/2016    Procedure: COMBINED CYSTOSCOPY, RETROGRADES, URETEROSCOPY, INSERT STENT;  Surgeon: Artemio Valenzuela MD;  Location: [facility] OR     COMBINED CYSTOSCOPY, RETROGRADES, URETEROSCOPY, LASER HOLMIUM LITHOTRIPSY URETER(S), INSERT STENT Left 10/3/2016    Procedure: COMBINED CYSTOSCOPY, RETROGRADES, URETEROSCOPY, LASER HOLMIUM LITHOTRIPSY URETER(S), INSERT STENT;  Surgeon: Artemio Valenzuela MD;  Location:   OR     EXTRACORPOREAL SHOCK WAVE LITHOTRIPSY (ESWL) Left 9/8/2016    Procedure: EXTRACORPOREAL SHOCK WAVE LITHOTRIPSY (ESWL);  Surgeon: Artemio Valenzuela MD;  Location: [facility] OR     RECESSION GASTROCNEMIUS Right 2/1/2016    Procedure: RECESSION GASTROCNEMIUS;  Surgeon: Rachelle Manriquez, DPM, Pod;  Location: [facility] OR   *  *  Current Outpatient Medications   Medication Sig Dispense Refill     amLODIPine (NORVASC) 5 MG tablet Take one tablet twice daily  SEE MD THIS QUARTER 180 tablet 0     amoxicillin-clavulanate (AUGMENTIN) 875-125 MG tablet Take 1 tablet by mouth 2 times daily 20 tablet 0     aspirin (ASA) 325 MG tablet Take 325 mg by mouth daily       atorvastatin (LIPITOR) 40 MG tablet Take one tablet daily SEE PROVIDER FOR NEXT REFILL 90 tablet 0     blood glucose (NO BRAND SPECIFIED) lancets standard Use to test blood sugar 3 times daily or as directed. 300 each 3     blood glucose monitoring (NO BRAND SPECIFIED) meter device kit Use to test blood sugar 3 times daily or as directed. 1 kit 0     blood glucose monitoring (NO BRAND SPECIFIED) test strip Use to test blood sugars 3 times daily or as directed 300 strip 3     Cholecalciferol (VITAMIN D3 PO) Take 1,000 Units by mouth daily        Co-Enzyme Q10 100 MG CAPS Take 100 mg by mouth daily        collagenase (SANTYL) 250 UNIT/GM external ointment Apply topically daily 30 g 1     Continuous Blood Gluc Sensor (FREESTYLE LUCERO 14 DAY SENSOR) [facility] USE ONE EVERY 14 DAYS 2 each 3     furosemide (LASIX) 20 MG tablet Take 1 tablet (20 mg) by mouth daily As needed for edema 30 tablet 1     gabapentin (NEURONTIN) 100 MG capsule Take 300 mg by mouth 3 times daily       Glucagon, rDNA, (GLUCAGON EMERGENCY) 1 MG KIT Inject 1 mg into the muscle once for 1 dose 1 kit 0     hydrochlorothiazide (MICROZIDE) 12.5 MG capsule Take 1 capsule (12.5 mg) by mouth every morning 90 capsule 1     Insulin Aspart FlexPen 100 UNIT/ML SOPN INJECT AS DIRECTED PER SLIDING SCALE BEFORE EACH MEAL.  "MAX 70 UNITS DAILY 15 mL 1     insulin degludec (TRESIBA FLEXTOUCH) 100 UNIT/ML pen 45-55 units subcutaneous daily 60 mL 11     Insulin Pen Needle (PEN NEEDLES) 31G X 5 MM MISC 1 Device 4 times daily 200 each 10     ketoconazole (NIZORAL) 2 % external shampoo Apply topically daily as needed for itching or irritation See MD after 6 weeks 120 mL 1     lisinopril (ZESTRIL) 40 MG tablet TAKE 1 TABLET(40 MG) BY MOUTH DAILY 90 tablet 0     MAGNESIUM GLYCINATE PLUS PO Take 400 mg by mouth 2 times daily       metFORMIN (GLUCOPHAGE-XR) 500 MG 24 hr tablet TAKE 2 TABLETS BY MOUTH TWICE DAILY WITH MEALS 360 tablet 3     Multiple Vitamins-Minerals (MULTIVITAMIN PO) Take 1 tablet by mouth daily        Omega-3 Fatty Acids (OMEGA-3 FISH OIL PO) Take 1 g by mouth 2 times daily (with meals)        omeprazole (PRILOSEC) 40 MG DR capsule        potassium chloride ER (KLOR-CON M) 10 MEQ CR tablet Take 1 tablet (10 mEq) by mouth 2 times daily 5 tablet 3     primidone (MYSOLINE) 50 MG tablet TAKE 1 TABLET BY MOUTH THREE TIMES DAILY       semaglutide (OZEMPIC, 1 MG/DOSE,) 2 MG/1.5ML pen Inject 1 mg Subcutaneous every 7 days , on Wednesdays 6 mL 5     Semaglutide, 1 MG/DOSE, 4 MG/3ML SOPN Inject 1 mg Subcutaneous every 7 days on Wednesdays 3 mL 5     silver sulfADIAZINE (SILVADENE) 1 % external cream Apply topically daily 50 g 1     sulfamethoxazole-trimethoprim (BACTRIM DS) 800-160 MG tablet Take 1 tablet by mouth 2 times daily 20 tablet 0     tadalafil (CIALIS) 5 MG tablet TAKE 1 TABLET BY MOUTH EVERY DAY AS NEEDED one hour before intercourse 30 tablet 1         EXAM:    Vitals: /80   Ht 1.803 m (5' 11\")   Wt 107 kg (236 lb)   BMI 32.92 kg/m    BMI: Body mass index is 32.92 kg/m .    Vascular:  Pedal pulses are palpable for both the DP and PT arteries.  CFT < 3 sec.  No edema.       Neuro: Light touch sensation is grossly diminished, bilateral foot, to the L4, L5, S1 distributions  No evidence of weakness, spasticity, or " contracture in the lower extremities.      Derm:   1) Right hallux:  Previous ulceration remaoins healed.    New problem: Hyperkeratotic skin on the plantar aspect with a fissure.     2) Right 2nd toe:   Thick hyperkeratotic skin with underlying ulceration to the depth of deeper skin distal right second toe.  No edema. No erythmea.      3) Left plantar medial first metatarsal head: Thick hyperkeratotic lesion without evidence of epidermal breakdown.     Musculoskeletal: Previous amputation of the left hallux.  Previous partial amputation of the right hallux. Remaining lesser digits are contracted.  The bilateral second toes are rigid.

## 2021-12-31 DIAGNOSIS — I10 ESSENTIAL HYPERTENSION: ICD-10-CM

## 2021-12-31 PROBLEM — I73.9 PVD (PERIPHERAL VASCULAR DISEASE) (H): Status: ACTIVE | Noted: 2021-12-31

## 2022-01-02 DIAGNOSIS — E11.49 DIABETES MELLITUS TYPE 2 WITH NEUROLOGICAL MANIFESTATIONS (H): ICD-10-CM

## 2022-01-02 DIAGNOSIS — E11.52 TYPE 2 DIABETES MELLITUS WITH DIABETIC PERIPHERAL ANGIOPATHY AND GANGRENE, WITH LONG-TERM CURRENT USE OF INSULIN (H): ICD-10-CM

## 2022-01-02 DIAGNOSIS — Z79.4 TYPE 2 DIABETES MELLITUS WITH DIABETIC PERIPHERAL ANGIOPATHY AND GANGRENE, WITH LONG-TERM CURRENT USE OF INSULIN (H): ICD-10-CM

## 2022-01-03 ENCOUNTER — ANCILLARY PROCEDURE (OUTPATIENT)
Dept: GENERAL RADIOLOGY | Facility: CLINIC | Age: 62
End: 2022-01-03
Attending: FAMILY MEDICINE
Payer: COMMERCIAL

## 2022-01-03 ENCOUNTER — OFFICE VISIT (OUTPATIENT)
Dept: FAMILY MEDICINE | Facility: CLINIC | Age: 62
End: 2022-01-03
Payer: COMMERCIAL

## 2022-01-03 VITALS
RESPIRATION RATE: 18 BRPM | HEIGHT: 71 IN | DIASTOLIC BLOOD PRESSURE: 82 MMHG | HEART RATE: 64 BPM | BODY MASS INDEX: 34.16 KG/M2 | WEIGHT: 244 LBS | TEMPERATURE: 98.7 F | SYSTOLIC BLOOD PRESSURE: 172 MMHG | OXYGEN SATURATION: 97 %

## 2022-01-03 DIAGNOSIS — M41.9 SCOLIOSIS OF THORACIC SPINE, UNSPECIFIED SCOLIOSIS TYPE: ICD-10-CM

## 2022-01-03 DIAGNOSIS — N18.30 DIABETES MELLITUS DUE TO UNDERLYING CONDITION WITH STAGE 3 CHRONIC KIDNEY DISEASE, UNSPECIFIED WHETHER LONG TERM INSULIN USE, UNSPECIFIED WHETHER STAGE 3A OR 3B CKD (H): ICD-10-CM

## 2022-01-03 DIAGNOSIS — Z13.220 SCREENING FOR HYPERLIPIDEMIA: ICD-10-CM

## 2022-01-03 DIAGNOSIS — Z00.00 ROUTINE GENERAL MEDICAL EXAMINATION AT A HEALTH CARE FACILITY: Primary | ICD-10-CM

## 2022-01-03 DIAGNOSIS — M54.6 ACUTE MIDLINE THORACIC BACK PAIN: ICD-10-CM

## 2022-01-03 DIAGNOSIS — M54.2 NECK PAIN: ICD-10-CM

## 2022-01-03 DIAGNOSIS — M47.814 OSTEOARTHRITIS OF THORACIC SPINE, UNSPECIFIED SPINAL OSTEOARTHRITIS COMPLICATION STATUS: ICD-10-CM

## 2022-01-03 DIAGNOSIS — R39.15 URINARY URGENCY: ICD-10-CM

## 2022-01-03 DIAGNOSIS — E08.22 DIABETES MELLITUS DUE TO UNDERLYING CONDITION WITH STAGE 3 CHRONIC KIDNEY DISEASE, UNSPECIFIED WHETHER LONG TERM INSULIN USE, UNSPECIFIED WHETHER STAGE 3A OR 3B CKD (H): ICD-10-CM

## 2022-01-03 DIAGNOSIS — I10 ESSENTIAL HYPERTENSION: ICD-10-CM

## 2022-01-03 DIAGNOSIS — R04.0 EPISTAXIS: ICD-10-CM

## 2022-01-03 PROCEDURE — 72072 X-RAY EXAM THORAC SPINE 3VWS: CPT | Performed by: RADIOLOGY

## 2022-01-03 PROCEDURE — G0103 PSA SCREENING: HCPCS | Performed by: FAMILY MEDICINE

## 2022-01-03 PROCEDURE — 99396 PREV VISIT EST AGE 40-64: CPT | Mod: 25 | Performed by: FAMILY MEDICINE

## 2022-01-03 PROCEDURE — 99213 OFFICE O/P EST LOW 20 MIN: CPT | Mod: 25 | Performed by: FAMILY MEDICINE

## 2022-01-03 PROCEDURE — 91306 COVID-19,PF,MODERNA (18+ YRS BOOSTER .25ML): CPT | Performed by: FAMILY MEDICINE

## 2022-01-03 PROCEDURE — 90471 IMMUNIZATION ADMIN: CPT | Performed by: FAMILY MEDICINE

## 2022-01-03 PROCEDURE — 0064A COVID-19,PF,MODERNA (18+ YRS BOOSTER .25ML): CPT | Performed by: FAMILY MEDICINE

## 2022-01-03 PROCEDURE — 90682 RIV4 VACC RECOMBINANT DNA IM: CPT | Performed by: FAMILY MEDICINE

## 2022-01-03 RX ORDER — FUROSEMIDE 20 MG
20 TABLET ORAL DAILY PRN
Qty: 30 TABLET | Refills: 1 | COMMUNITY
Start: 2022-01-03 | End: 2022-01-20

## 2022-01-03 RX ORDER — AMLODIPINE BESYLATE 5 MG/1
TABLET ORAL
Qty: 180 TABLET | Refills: 0 | OUTPATIENT
Start: 2022-01-03

## 2022-01-03 RX ORDER — INSULIN ASPART 100 [IU]/ML
INJECTION, SOLUTION INTRAVENOUS; SUBCUTANEOUS
Qty: 15 ML | Refills: 0 | Status: SHIPPED | OUTPATIENT
Start: 2022-01-03 | End: 2022-01-07

## 2022-01-03 RX ORDER — AMLODIPINE BESYLATE 5 MG/1
TABLET ORAL
Qty: 180 TABLET | Refills: 1 | Status: SHIPPED | OUTPATIENT
Start: 2022-01-03 | End: 2022-01-20

## 2022-01-03 SDOH — HEALTH STABILITY: PHYSICAL HEALTH: ON AVERAGE, HOW MANY MINUTES DO YOU ENGAGE IN EXERCISE AT THIS LEVEL?: 40 MIN

## 2022-01-03 SDOH — ECONOMIC STABILITY: INCOME INSECURITY: HOW HARD IS IT FOR YOU TO PAY FOR THE VERY BASICS LIKE FOOD, HOUSING, MEDICAL CARE, AND HEATING?: NOT VERY HARD

## 2022-01-03 SDOH — HEALTH STABILITY: PHYSICAL HEALTH: ON AVERAGE, HOW MANY DAYS PER WEEK DO YOU ENGAGE IN MODERATE TO STRENUOUS EXERCISE (LIKE A BRISK WALK)?: 4 DAYS

## 2022-01-03 SDOH — ECONOMIC STABILITY: FOOD INSECURITY: WITHIN THE PAST 12 MONTHS, THE FOOD YOU BOUGHT JUST DIDN'T LAST AND YOU DIDN'T HAVE MONEY TO GET MORE.: NEVER TRUE

## 2022-01-03 SDOH — ECONOMIC STABILITY: FOOD INSECURITY: WITHIN THE PAST 12 MONTHS, YOU WORRIED THAT YOUR FOOD WOULD RUN OUT BEFORE YOU GOT MONEY TO BUY MORE.: NEVER TRUE

## 2022-01-03 SDOH — ECONOMIC STABILITY: INCOME INSECURITY: IN THE LAST 12 MONTHS, WAS THERE A TIME WHEN YOU WERE NOT ABLE TO PAY THE MORTGAGE OR RENT ON TIME?: NO

## 2022-01-03 ASSESSMENT — ENCOUNTER SYMPTOMS
DIARRHEA: 0
SHORTNESS OF BREATH: 0
HEARTBURN: 0
HEMATOCHEZIA: 0
HEMATURIA: 0
EYE PAIN: 0
ABDOMINAL PAIN: 0
NERVOUS/ANXIOUS: 0
DYSURIA: 0
PALPITATIONS: 0
FEVER: 0
SORE THROAT: 0
WEAKNESS: 0
NAUSEA: 1
PARESTHESIAS: 1
COUGH: 0
JOINT SWELLING: 0
FREQUENCY: 0
MYALGIAS: 0
CHILLS: 0
HEADACHES: 0
DIZZINESS: 1
ARTHRALGIAS: 0
CONSTIPATION: 1

## 2022-01-03 ASSESSMENT — SOCIAL DETERMINANTS OF HEALTH (SDOH)
HOW OFTEN DO YOU GET TOGETHER WITH FRIENDS OR RELATIVES?: NEVER
DO YOU BELONG TO ANY CLUBS OR ORGANIZATIONS SUCH AS CHURCH GROUPS UNIONS, FRATERNAL OR ATHLETIC GROUPS, OR SCHOOL GROUPS?: NO
IN A TYPICAL WEEK, HOW MANY TIMES DO YOU TALK ON THE PHONE WITH FAMILY, FRIENDS, OR NEIGHBORS?: ONCE A WEEK
HOW OFTEN DO YOU ATTEND CHURCH OR RELIGIOUS SERVICES?: MORE THAN 4 TIMES PER YEAR

## 2022-01-03 ASSESSMENT — MIFFLIN-ST. JEOR: SCORE: 1935.97

## 2022-01-03 ASSESSMENT — LIFESTYLE VARIABLES
HOW OFTEN DO YOU HAVE A DRINK CONTAINING ALCOHOL: 2-4 TIMES A MONTH
HOW OFTEN DO YOU HAVE SIX OR MORE DRINKS ON ONE OCCASION: NEVER
HOW MANY STANDARD DRINKS CONTAINING ALCOHOL DO YOU HAVE ON A TYPICAL DAY: 1 OR 2

## 2022-01-03 NOTE — PATIENT INSTRUCTIONS
Preventive Health Recommendations  Male Ages 50 - 64    Yearly exam:             See your health care provider every year in order to  o   Review health changes.   o   Discuss preventive care.    o   Review your medicines if your doctor has prescribed any.     Have a cholesterol test every 5 years, or more frequently if you are at risk for high cholesterol/heart disease.     Have a diabetes test (fasting glucose) every three years. If you are at risk for diabetes, you should have this test more often.     Have a colonoscopy at age 50, or have a yearly FIT test (stool test). These exams will check for colon cancer.      Talk with your health care provider about whether or not a prostate cancer screening test (PSA) is right for you.    You should be tested each year for STDs (sexually transmitted diseases), if you re at risk.     Shots: Get a flu shot each year. Get a tetanus shot every 10 years.     Nutrition:    Eat at least 5 servings of fruits and vegetables daily.     Eat whole-grain bread, whole-wheat pasta and brown rice instead of white grains and rice.     Get adequate Calcium and Vitamin D.     Lifestyle    Exercise for at least 150 minutes a week (30 minutes a day, 5 days a week). This will help you control your weight and prevent disease.     Limit alcohol to one drink per day.     No smoking.     Wear sunscreen to prevent skin cancer.     See your dentist every six months for an exam and cleaning.     See your eye doctor every 1 to 2 years.    Patient Education     Back Care Tips     Caring for your back  These are things you can do to prevent a recurrence of acute back pain and to reduce symptoms from chronic back pain:    Stay at a healthy weight. If you are overweight, losing weight will help most types of back pain.    Exercise is an important part of recovery from most types of back pain. The muscles behind and in front of the spine support the back. This means strengthening both the back muscles  and the abdominal muscles will provide better support for your spine.     Swimming and brisk walking are good overall exercises to improve your fitness level.    Practice safe lifting methods (see below).    Practice good posture when sitting, standing, and walking. Don't sit for a long time. This puts more stress on the lower back than standing or walking.    Wear quality shoes with good arch support. Foot and ankle alignment can affect back symptoms. Don't wear high heels.    Therapeutic massage can help relax the back muscles without stretching them.    During the first 24 to 72 hours after an acute injury or flare-up of chronic back pain, put an ice pack on the painful area for 20 minutes and then remove it for 20 minutes. Do thisover a period of 60 to 90 minutes, or several times a day. As a safety precaution, don't use a heating pad at bedtime. Sleeping on a heating pad can lead to skin burns or tissue damage.    You can alternate using ice and heat.  Medicines  Talk with your healthcare provider before using medicines, especially if you have other health problems or are taking other medicines.    You may use over-the-counter medicines, such as acetaminophen, ibuprofen, or naprosyn to control pain, unless your healthcare provider prescribed other pain medicine. Talk with your healthcare provider before taking any medicines if you have a chronic condition such as diabetes, liver or kidney disease, stomach ulcers, or digestive bleeding, or are taking blood thinners.    Be careful if you are given prescription pain medicines, opioids, or medicine for muscle spasm. They can cause drowsiness, and affect your coordination, reflexes, and judgment. Don't drive or operate heavy machinery while taking these types of medicines. Take prescription pain medicine only as prescribed by your healthcare provider.  Lumbar stretch  This simple stretch will help relax muscle spasm and keep your back more limber. If exercise makes  your back pain worse, don t do it.    Lie on your back with your knees bent and both feet on the ground.    Slowly raise your left knee to your chest as you flatten your lower back against the floor. Hold for 5 seconds.    Relax and repeat the exercise with your right knee.    Do 10 of these exercises for each leg.  Safe lifting method    Don t bend over at the waist to lift an object off the floor.  Instead, bend your knees and hips in a squat.     Keep your back and head upright    Hold the object close to your body, directly in front of you.    Straighten your legs to lift the object.     Lower the object to the floor in the reverse fashion.    If you must slide something across the floor, push it.    Posture tips  Sitting  Sit in chairs with straight backs or low-back support. Keep your knees lower than your hips, with your feet flat on the floor.  When driving, sit up straight. Adjust the seat forward so you are not leaning toward the steering wheel.  A small pillow or rolled towel behind your lower back may help if you are driving long distances.   Standing  When standing for long periods, shift most of your weight to one leg at a time. Switch legs every few minutes.   Sleeping  The best way to sleep is on your side with your knees bent. Put a low pillow under your head to support your neck in a neutral spine position. Don't use thick pillows that bend your neck to one side. Put a pillow between your legs to further relax your lower back. If you sleep on your back, put pillows under your knees to support your legs in a slightly flexed position. Use a firm mattress. If your mattress sags, replace it, or use a 1/2-inch plywood board under the mattress to add support.  Follow-up care  Follow up with your healthcare provider, or as advised.  If X-rays, a CT scan or an MRI scan were taken, they may be reviewed by a radiologist. You will be told of any new findings that may affect your care.  Call 911  Call 911 if  any of the following occur:    Trouble breathing    Confusion    Very drowsy    Fainting or loss of consciousness    Rapid or very slow heart rate    Loss of  bowel or bladder control  When to seek medical advice  Call your healthcare provider right away if any of the following occur:    Pain becomes worse or spreads to your arms or legs    Weakness or numbness in one or both arms or legs    Numbness in the groin area  [employer] last reviewed this educational content on 11/1/2019 2000-2021 [employer]. All rights reserved. This information is not intended as a substitute for professional medical care. Always follow your healthcare professional's instructions.           Patient Education     Neck Pain     Neck pain has several possible causes when there is no injury:    You can get a minor ligament sprain or muscle strain from a sudden minor neck movement. Sleeping with your neck in an awkward position can also cause this.    Some people respond to emotional stress by tensing the muscles of their neck, shoulders, and upper back. Chronic spasm in these muscles can cause neck pain and sometimes headaches.    Gradual wear and tear of the joints in the spine can cause degenerative arthritis. This can be a source of occasional or chronic neck pain.    The spinal disks may bulge and put pressure on a nearby spinal nerve. This can happen as a natural result of aging or repeated small injuries to the neck. The spinal disks are the cushions between each spinal bone. This causes tingling, pain, or numbness that spreads from the neck to the shoulder, arm, or hand on one side.  Acute neck pain usually gets better in 1 to 2 weeks. Neck pain related to disk disease, arthritis in the spinal joints, or spinal stenosis can become chronic and last for months or years. Spinal stenosis is narrowing of the spinal canal.  X-rays are usually not ordered for the initial evaluation of neck pain. But X-rays may be done if you  had a forceful physical injury, such as a car accident or fall. If pain continues and doesn t respond to medical treatment, X-rays and other tests may be done at a later time.  Home care    Rest and relax the muscles. Use a comfortable pillow that supports the head. It should also help keep the spine in a neutral position. The position of the head should not be tilted forward or backward. A rolled up towel may help for a custom fit.    A soft cervical collar can help pain, especially pain with head movement. Your doctor can tell you if this is appropriate for your condition.    Some people find relief with heat. Heat can be applied with either a warm shower or bath or a moist towel heated in the microwave and massage. Others prefer cold packs. You can make an ice pack by filling a plastic bag that seals at the top with ice cubes or crushed ice and then wrapping it with a thin towel. Try both and use the method that feels best for 15 to 20 minutes, several times a day.    Whether using ice or heat, be careful that you don't injure your skin. Never put ice directly on the skin. Always wrap the ice in a towel or other type of cloth. This is very important, especially in people with poor skin sensations.     Try to reduce your stress level. Emotional stress can lead to neck muscle tension and get in the way of or delay the healing process.    You may use over-the-counter pain medicine to control pain, unless another medicine was prescribed. If you have chronic liver or kidney disease or ever had a stomach ulcer or digestive bleeding, talk with your healthcare provider before using these medicines.    Follow-up care  Follow up with your healthcare provider if your symptoms don't show signs of improvement after one week. Physical therapy or further tests may be needed.  If X-rays, CT scans, or MRI scans were taken, you will be told of any new findings that may affect your care.  Call 911  Call 911 if you  have:    Sudden weakness or numbness in one or both arms    Neck swelling, difficulty or painful swallowing    Trouble breathing    Chest pain  When to seek medical advice  Call your healthcare provider right away if any of these occur:    Pain becomes worse or spreads into one or both arm    Increasing headache    Fever of 100.4 F (38 C) or higher, or as directed by your healthcare provider  [employer] last reviewed this educational content on 11/1/2019 2000-2021 [employer]. All rights reserved. This information is not intended as a substitute for professional medical care. Always follow your healthcare professional's instructions.           Patient Education     Nosebleed  The skin inside your nose is fragile and filled with blood vessels. That's why even a slight injury to your nose sometimes may cause bleeding. Hard nose blowing, dry winter air, colds, and nose-picking can also cause nosebleeds. Medicines such as warfarin, aspirin, and other blood thinners can make it more likely to have a nosebleed that is hard to stop. Normally, nosebleeds aren't a cause for concern. But in some cases, they can mean that you have a more serious health problem. Know when to seek medical care for a nosebleed.   When to go to the emergency room (ER)  Most nosebleeds aren t a medical emergency. In fact, you often can treat them yourself. But see your healthcare provider if you have nosebleeds often. And seek care right away if you:     Have a head injury    Have bleeding that lasts more than 15 to 30 minutes or is severe    Feel weak or faint    Have trouble breathing  What to expect in the ER    You will be examined and may have blood tests.    You may be given medicated nose drops to stop the nosebleed.    The doctor may pack gauze into your nose to put pressure on the vessel and help stop bleeding.    The bleeding vessel may be cauterized. During this procedure, the vessel is burned with an electrical device or  chemical. Your nose is first numbed so you won t feel any pain.    In rare cases, you may need surgery to control the bleeding.    Home care for a nosebleed    Don't blow your nose for 12 hours after the bleeding stops. This will let a strong blood clot form. Don't pick your nose. This may restart bleeding.    Don't drink alcohol or hot liquids for the next 2 days. Alcohol and hot liquids can dilate blood vessels in your nose. This can cause bleeding to start again.    Don't take ibuprofen, naproxen, or medicines that contain aspirin. These thin the blood and may cause your nose to bleed. You may take acetaminophen for pain, unless another pain medicine was prescribed.    If the bleeding starts again, sit up and lean forward to prevent swallowing blood. Pinch your nose tightly on both sides for 10 to 15 minutes. Time yourself. Don t release the pressure on your nose until 10 minutes is up. If bleeding doesn't stop, continue to pinch your nose. Call your healthcare provider.    If you have a cold, allergies, or dry nasal membranes, lubricate the nasal passages. Apply a small amount of petroleum jelly inside the nose with a cotton swab twice a day (morning and night).    Don't overheat your home. This can dry the air and make your condition worse.    Put a humidifier in the room where you sleep. This will add moisture to the air.    Use a saline nasal spray to keep nasal passages moist.    Don't pick your nose. Keep fingernails trimmed to decrease risk of bleeds.    Don't smoke. Stay away from secondhand smoke. Don't let people smoke in your home.    Follow all other home care instructions from your healthcare provider.    Call your healthcare provider if you have any questions or concerns.    [email or web address] last reviewed this educational content on 7/1/2019 2000-2021 [employer]. All rights reserved. This information is not intended as a substitute for professional medical care. Always follow your  healthcare professional's instructions.

## 2022-01-03 NOTE — TELEPHONE ENCOUNTER
Duplicate request, refilled today. Refusing refill request and closing encounter.     Maria C Evans RN on 1/3/2022 at 11:28 AM
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
## 2022-01-04 NOTE — TELEPHONE ENCOUNTER
FUTURE VISIT INFORMATION      FUTURE VISIT INFORMATION:    Date: 2/23/2022    Time: 10:30AM    Location: [facility]  REFERRAL INFORMATION:    Referring provider:  Johann Serna DO    Referring providers clinic:  [facility]    Reason for visit/diagnosis  Epistaxis, referred by Johann Serna DO in  [facility]ACTICE, recds in Epic per pt    RECORDS REQUESTED FROM:       Clinic name Comments Records Status Imaging Status   [facility] 1/3/2022 note and referral from Johann Serna DO [facility]    Imaging 12/20/2019 MR [facility]    [facility]   [facility]  12/28/2020 CT [facility] everywhere  req 1/4/22 - PACS                       1/4/22 9:43AM sent a fax to [facility] for images - [facility]   1/10/22 2:50PM images received in PACS from [facility] - [facility]

## 2022-01-05 ENCOUNTER — APPOINTMENT (OUTPATIENT)
Dept: GENERAL RADIOLOGY | Facility: CLINIC | Age: 62
End: 2022-01-05
Attending: EMERGENCY MEDICINE
Payer: COMMERCIAL

## 2022-01-05 ENCOUNTER — HOSPITAL ENCOUNTER (EMERGENCY)
Facility: CLINIC | Age: 62
Discharge: HOME | End: 2022-01-06
Attending: EMERGENCY MEDICINE | Admitting: EMERGENCY MEDICINE
Payer: COMMERCIAL

## 2022-01-05 ENCOUNTER — OFFICE VISIT (OUTPATIENT)
Dept: NEUROSURGERY | Facility: CLINIC | Age: 62
End: 2022-01-05
Attending: PHYSICIAN ASSISTANT
Payer: COMMERCIAL

## 2022-01-05 VITALS
SYSTOLIC BLOOD PRESSURE: 157 MMHG | BODY MASS INDEX: 34.16 KG/M2 | WEIGHT: 244 LBS | DIASTOLIC BLOOD PRESSURE: 73 MMHG | HEART RATE: 71 BPM | OXYGEN SATURATION: 95 % | HEIGHT: 71 IN

## 2022-01-05 DIAGNOSIS — M54.6 ACUTE MIDLINE THORACIC BACK PAIN: Primary | ICD-10-CM

## 2022-01-05 DIAGNOSIS — S91.111A LACERATION OF RIGHT GREAT TOE WITHOUT FOREIGN BODY PRESENT OR DAMAGE TO NAIL, INITIAL ENCOUNTER: ICD-10-CM

## 2022-01-05 LAB — PSA SERPL-MCNC: 0.44 UG/L (ref 0–4)

## 2022-01-05 PROCEDURE — 250N000011 HC RX IP 250 OP 636: Performed by: EMERGENCY MEDICINE

## 2022-01-05 PROCEDURE — G0463 HOSPITAL OUTPT CLINIC VISIT: HCPCS

## 2022-01-05 PROCEDURE — 73630 X-RAY EXAM OF FOOT: CPT | Mod: RT

## 2022-01-05 PROCEDURE — 90715 TDAP VACCINE 7 YRS/> IM: CPT | Performed by: EMERGENCY MEDICINE

## 2022-01-05 PROCEDURE — 99284 EMERGENCY DEPT VISIT MOD MDM: CPT | Mod: 25

## 2022-01-05 PROCEDURE — 99283 EMERGENCY DEPT VISIT LOW MDM: CPT

## 2022-01-05 PROCEDURE — 90471 IMMUNIZATION ADMIN: CPT | Performed by: EMERGENCY MEDICINE

## 2022-01-05 PROCEDURE — 12002 RPR S/N/AX/GEN/TRNK2.6-7.5CM: CPT | Mod: T5

## 2022-01-05 PROCEDURE — 99203 OFFICE O/P NEW LOW 30 MIN: CPT | Performed by: PHYSICIAN ASSISTANT

## 2022-01-05 RX ORDER — BUPIVACAINE HYDROCHLORIDE 5 MG/ML
INJECTION, SOLUTION PERINEURAL
Status: DISCONTINUED
Start: 2022-01-05 | End: 2022-01-06 | Stop reason: HOSPADM

## 2022-01-05 RX ADMIN — CLOSTRIDIUM TETANI TOXOID ANTIGEN (FORMALDEHYDE INACTIVATED), CORYNEBACTERIUM DIPHTHERIAE TOXOID ANTIGEN (FORMALDEHYDE INACTIVATED), BORDETELLA PERTUSSIS TOXOID ANTIGEN (GLUTARALDEHYDE INACTIVATED), BORDETELLA PERTUSSIS FILAMENTOUS HEMAGGLUTININ ANTIGEN (FORMALDEHYDE INACTIVATED), BORDETELLA PERTUSSIS PERTACTIN ANTIGEN, AND BORDETELLA PERTUSSIS FIMBRIAE 2/3 ANTIGEN 0.5 ML: 5; 2; 2.5; 5; 3; 5 INJECTION, SUSPENSION INTRAMUSCULAR at 23:54

## 2022-01-05 ASSESSMENT — MIFFLIN-ST. JEOR: SCORE: 1943.91

## 2022-01-05 ASSESSMENT — PAIN SCALES - GENERAL: PAINLEVEL: MILD PAIN (3)

## 2022-01-05 NOTE — LETTER
1/5/2022         RE: Crispin Rojas  [id number]  [place] [address]        Dear Colleague,    Thank you for referring your patient, Crispin Rojas, to the [facility]UROS[facility]GERY [facility]IC [place]. Please see a copy of my visit note below.    NEUROSURGERY CLINIC CONSULT NOTE     DATE OF VISIT: 1/5/2022     SUBJECTIVE:     Crispin Rojas is a pleasant 61 year old male who presents to the clinic today for consultation on upper thoracic midline back pain. He is referred to the Neurosurgery Clinic by Dr. Serna in Primary Care.     Today, he reports a 6-month history of symptoms. He describes intermittent, burning pain that initiates in the upper thoracic region and does not radiate. This pain is not accompanied by paresthesia, numbness and/or perceived weakness in the same distribution. He rates pain 2-3/10. Pain will self resolve. There are no aggravating symptoms or alleviating symptoms. No mechanism of injury such as trauma or a fall is associated with the onset of the pain. There are no bowel or bladder changes. He denies saddle anesthesia. He denies changes in gait, instability, or falling episodes. There has been no significant change in his handwriting or hand dexterity.     Judie works from April through the end of the year and is very active during this timeframe. Therefore, he is hopeful to get his pain sorted prior to starting back up again in April.     He has not participated in conservative therapies.          Current Outpatient Medications:      amLODIPine (NORVASC) 5 MG tablet, Take one tablet twice daily  SEE MD THIS QUARTER, Disp: 180 tablet, Rfl: 1     aspirin (ASA) 325 MG tablet, Take 325 mg by mouth daily, Disp: , Rfl:      atorvastatin (LIPITOR) 40 MG tablet, Take one tablet daily SEE PROVIDER FOR NEXT REFILL, Disp: 90 tablet, Rfl: 0     blood glucose (NO BRAND SPECIFIED) lancets standard, Use to test blood sugar 3 times daily or as directed., Disp: 300 each, Rfl: 3      blood glucose monitoring (NO BRAND SPECIFIED) meter device kit, Use to test blood sugar 3 times daily or as directed., Disp: 1 kit, Rfl: 0     blood glucose monitoring (NO BRAND SPECIFIED) test strip, Use to test blood sugars 3 times daily or as directed, Disp: 300 strip, Rfl: 3     Cholecalciferol (VITAMIN D3 PO), Take 1,000 Units by mouth daily , Disp: , Rfl:      Co-Enzyme Q10 100 MG CAPS, Take 100 mg by mouth daily , Disp: , Rfl:      collagenase (SANTYL) 250 UNIT/GM external ointment, Apply topically daily, Disp: 30 g, Rfl: 1     Continuous Blood Gluc Sensor (FREESTYLE LUCERO 14 DAY SENSOR) [facility], USE ONE EVERY 14 DAYS, Disp: 2 each, Rfl: 3     furosemide (LASIX) 20 MG tablet, Take 1 tablet (20 mg) by mouth daily as needed (for leg swelling) As needed for edema, Disp: 30 tablet, Rfl: 1     gabapentin (NEURONTIN) 100 MG capsule, Take 300 mg by mouth 3 times daily, Disp: , Rfl:      Glucagon, rDNA, (GLUCAGON EMERGENCY) 1 MG KIT, Inject 1 mg into the muscle once for 1 dose, Disp: 1 kit, Rfl: 0     hydrochlorothiazide (MICROZIDE) 12.5 MG capsule, Take 1 capsule (12.5 mg) by mouth every morning, Disp: 90 capsule, Rfl: 1     Insulin Aspart FlexPen 100 UNIT/ML SOPN, INJECT AS DIRECTED PER SLIDING SCALE BEFORE EACH MEAL. MAX 70 UNITS DAILY, Disp: 15 mL, Rfl: 0     insulin degludec (TRESIBA FLEXTOUCH) 100 UNIT/ML pen, 45-55 units subcutaneous daily, Disp: 60 mL, Rfl: 11     Insulin Pen Needle (PEN NEEDLES) 31G X 5 MM MISC, 1 Device 4 times daily, Disp: 200 each, Rfl: 10     ketoconazole (NIZORAL) 2 % external shampoo, APPLY TOPICALLY DAILY AS NEEDED FOR ITCHING OR IRRITATION. SEE MD AFTER 6 WEEKS., Disp: 120 mL, Rfl: 1     lisinopril (ZESTRIL) 40 MG tablet, TAKE 1 TABLET(40 MG) BY MOUTH DAILY, Disp: 90 tablet, Rfl: 0     MAGNESIUM GLYCINATE PLUS PO, Take 400 mg by mouth 2 times daily, Disp: , Rfl:      metFORMIN (GLUCOPHAGE-XR) 500 MG 24 hr tablet, TAKE 2 TABLETS BY MOUTH TWICE DAILY WITH MEALS, Disp: 360 tablet, Rfl:  3     Multiple Vitamins-Minerals (MULTIVITAMIN PO), Take 1 tablet by mouth daily , Disp: , Rfl:      Omega-3 Fatty Acids (OMEGA-3 FISH OIL PO), Take 1 g by mouth 2 times daily (with meals) , Disp: , Rfl:      omeprazole (PRILOSEC) 40 MG DR capsule, , Disp: , Rfl:      potassium chloride ER (KLOR-CON M) 10 MEQ CR tablet, Take 1 tablet (10 mEq) by mouth 2 times daily, Disp: 5 tablet, Rfl: 3     primidone (MYSOLINE) 50 MG tablet, TAKE 1 TABLET BY MOUTH THREE TIMES DAILY, Disp: , Rfl:      semaglutide (OZEMPIC, 1 MG/DOSE,) 2 MG/1.5ML pen, Inject 1 mg Subcutaneous every 7 days , on Wednesdays, Disp: 6 mL, Rfl: 5     silver sulfADIAZINE (SILVADENE) 1 % external cream, Apply topically daily, Disp: 50 g, Rfl: 1     tadalafil (CIALIS) 5 MG tablet, TAKE 1 TABLET BY MOUTH EVERY DAY AS NEEDED one hour before intercourse, Disp: 30 tablet, Rfl: 1     Allergies   Allergen Reactions     Bactrim [Sulfamethoxazole W/Trimethoprim] Nausea and Vomiting     Sulfa Drugs Nausea     Niacin Swelling     SIMCOR caused edema in extremities     Simvastatin Swelling     SIMCOR caused edema in extremities        Past Medical History:   Diagnosis Date     Cerebral infarction (H)     2010     Chronic infection      History of heartburn      Hyperlipidemia LDL goal <70      Hypertension      Numbness and tingling      Obesity      GILA (obstructive sleep apnea) 10/22/2018     PVD (peripheral vascular disease) (H)     s/p left partial toe amputation     Renal stone      Tremor      Type II or unspecified type diabetes mellitus without mention of complication, not stated as uncontrolled         ROS: 10 point review of symptoms are negative other than the symptoms noted above in the HPI.     Family History has been reviewed with the patient, there are no pertinent findings to presenting concern.     Past Surgical History:   Procedure Laterality Date     AMPUTATE FOOT Left 8/18/2016    Procedure: AMPUTATE FOOT;  Surgeon: Jack Younger DPM;   "Location: [facility] OR     AMPUTATE TOE(S) Right 2/1/2016    Procedure: AMPUTATE TOE(S);  Surgeon: Rachelle Manriquez DPM, Pod;  Location: [facility] OR     AMPUTATE TOE(S) Right 8/2/2019    Procedure: Right fourth toe partial amputation for treatment of osteomyelitis.;  Surgeon: Jcak Younger, SHRUTHI;  Location: [facility] OR     AMPUTATE TOE(S) Left 11/8/2019    Procedure: Left second toe amputation at the metatarsophalangeal joint.;  Surgeon: Rachelle Manriquez DPM, Podiatry/Foot and Ankle Surgery;  Location: [facility] OR     ANGIOGRAM       COLONOSCOPY       COMBINED CYSTOSCOPY, RETROGRADES, URETEROSCOPY, INSERT STENT Left 8/21/2016    Procedure: COMBINED CYSTOSCOPY, RETROGRADES, URETEROSCOPY, INSERT STENT;  Surgeon: Artemio Valenzuela MD;  Location: [facility] OR     COMBINED CYSTOSCOPY, RETROGRADES, URETEROSCOPY, LASER HOLMIUM LITHOTRIPSY URETER(S), INSERT STENT Left 10/3/2016    Procedure: COMBINED CYSTOSCOPY, RETROGRADES, URETEROSCOPY, LASER HOLMIUM LITHOTRIPSY URETER(S), INSERT STENT;  Surgeon: Artemio Valenzuela MD;  Location: [facility] OR     EXTRACORPOREAL SHOCK WAVE LITHOTRIPSY (ESWL) Left 9/8/2016    Procedure: EXTRACORPOREAL SHOCK WAVE LITHOTRIPSY (ESWL);  Surgeon: Artemio Valenzuela MD;  Location: [facility] OR     RECESSION GASTROCNEMIUS Right 2/1/2016    Procedure: RECESSION GASTROCNEMIUS;  Surgeon: Rachelle Manriquez DPM, Pod;  Location: [facility] OR        Social History     Tobacco Use     Smoking status: Never Smoker     Smokeless tobacco: Never Used   Vaping Use     Vaping Use: Never used   Substance Use Topics     Alcohol use: Yes     Alcohol/week: 0.0 standard drinks     Comment: rare---red wine 3x per month     Drug use: No        OBJECTIVE:   BP (!) 157/73   Pulse 71   Ht 5' 11\" (1.803 m)   Wt 244 lb (110.7 kg)   SpO2 95%   BMI 34.03 kg/m     Body mass index is 34.03 kg/m .     Imaging:     XR THORACIC SPINE THREE VIEWS 1/3/2022 9:41 AM     INDICATION: Acute midline thoracic back pain     COMPARISON: None.                              "                                         IMPRESSION: Broad, mild convex right thoracic curvature. Mild  chronic-appearing height loss of several mid to lower thoracic  vertebral bodies. No acute fracture is evident. There is associated  mild interspace and endplate degeneration.     JANIS VOSS MD     Full radiological report in chart. Imaging was reviewed with with patient today.     Exam:   CN II-XII grossly intact, alert and appropriate with conversation and following commands.   Gait is non-antalgic. Able to walk on toes and heels without difficulty.   Cervical spine is non tender to palpation.  Bilateral bicep 2/4 and tricep reflexes 1/4. Sensation intact throughout upper extremities.     UE muscle strength  Right  Left    Deltoid  5/5  5/5    Biceps  5/5  5/5    Triceps  5/5  5/5    Hand intrinsics  5/5  5/5    Hand grasp  5/5  5/5    Gonsalez signs  neg  neg      Thoracic spine is tender to palpation upper spinous process and paraspinous muscles   Lumbar spine is non tender to palpation.  Intact sensation throughout lower extremities.   Bilateral patellar 2/4 and achilles reflex 1/4.     LE muscle strength  Right  Left    Iliopsoas (hip flexion)  5/5  5/5    Quad (knee extension)  5/5  5/5    Hamstring (knee flexion)  5/5  5/5    Gastrocnemius (PF)  5/5  5/5    Tibialis Ant. (DF)  5/5  5/5    EHL  5/5  5/5      Negative for clonus.   Calves are soft and non-tender bilaterally.     ASSESSMENT/PLAN:     Crispin Rojas is a pleasant 61 year old male who presents to the clinic today for consultation on upper thoracic midline back pain. He is referred to the Neurosurgery Clinic by Dr. Serna in Primary Care. The patient's most recent imaging was reviewed with him today. On exam, he is noted to have appropriate strength, sensation and range of motion. He has not attempted conservative management.     Based on his physical exam, imaging review, and past treatments, we feel that it would be in his best  interest to try a conservative approach by participating in a physical therapy program. We also discussed Pain management referral for comprehensive services.     Should symptoms persist or worsen despite conservative measures, recommend he contact our office and we will consider further imaging with MRI. Pat in agreement with all above stated plans.     Respectfully,     Ricarda Zayas PA-C  [facility] Neurosurgery  [address] [address]    Tel [phone]          Again, thank you for allowing me to participate in the care of your patient.        Sincerely,        Ricarda Zayas PA-C

## 2022-01-05 NOTE — PROGRESS NOTES
NEUROSURGERY CLINIC CONSULT NOTE     DATE OF VISIT: 1/5/2022     SUBJECTIVE:     Crispin Rojas is a pleasant 61 year old male who presents to the clinic today for consultation on upper thoracic midline back pain. He is referred to the Neurosurgery Clinic by Dr. Serna in Primary Care.     Today, he reports a 6-month history of symptoms. He describes intermittent, burning pain that initiates in the upper thoracic region and does not radiate. This pain is not accompanied by paresthesia, numbness and/or perceived weakness in the same distribution. He rates pain 2-3/10. Pain will self resolve. There are no aggravating symptoms or alleviating symptoms. No mechanism of injury such as trauma or a fall is associated with the onset of the pain. There are no bowel or bladder changes. He denies saddle anesthesia. He denies changes in gait, instability, or falling episodes. There has been no significant change in his handwriting or hand dexterity.     Judie works from April through the end of the year and is very active during this timeframe. Therefore, he is hopeful to get his pain sorted prior to starting back up again in April.     He has not participated in conservative therapies.          Current Outpatient Medications:      amLODIPine (NORVASC) 5 MG tablet, Take one tablet twice daily  SEE MD THIS QUARTER, Disp: 180 tablet, Rfl: 1     aspirin (ASA) 325 MG tablet, Take 325 mg by mouth daily, Disp: , Rfl:      atorvastatin (LIPITOR) 40 MG tablet, Take one tablet daily SEE PROVIDER FOR NEXT REFILL, Disp: 90 tablet, Rfl: 0     blood glucose (NO BRAND SPECIFIED) lancets standard, Use to test blood sugar 3 times daily or as directed., Disp: 300 each, Rfl: 3     blood glucose monitoring (NO BRAND SPECIFIED) meter device kit, Use to test blood sugar 3 times daily or as directed., Disp: 1 kit, Rfl: 0     blood glucose monitoring (NO BRAND SPECIFIED) test strip, Use to test blood sugars 3 times daily or as directed, Disp: 300  strip, Rfl: 3     Cholecalciferol (VITAMIN D3 PO), Take 1,000 Units by mouth daily , Disp: , Rfl:      Co-Enzyme Q10 100 MG CAPS, Take 100 mg by mouth daily , Disp: , Rfl:      collagenase (SANTYL) 250 UNIT/GM external ointment, Apply topically daily, Disp: 30 g, Rfl: 1     Continuous Blood Gluc Sensor (FREESTYLE LUCERO 14 DAY SENSOR) [facility] [place] ONE EVERY 14 DAYS, Disp: 2 each, Rfl: 3     furosemide (LASIX) 20 MG tablet, Take 1 tablet (20 mg) by mouth daily as needed (for leg swelling) As needed for edema, Disp: 30 tablet, Rfl: 1     gabapentin (NEURONTIN) 100 MG capsule, Take 300 mg by mouth 3 times daily, Disp: , Rfl:      Glucagon, rDNA, (GLUCAGON EMERGENCY) 1 MG KIT, Inject 1 mg into the muscle once for 1 dose, Disp: 1 kit, Rfl: 0     hydrochlorothiazide (MICROZIDE) 12.5 MG capsule, Take 1 capsule (12.5 mg) by mouth every morning, Disp: 90 capsule, Rfl: 1     Insulin Aspart FlexPen 100 UNIT/ML SOPN, INJECT AS DIRECTED PER SLIDING SCALE BEFORE EACH MEAL. MAX 70 UNITS DAILY, Disp: 15 mL, Rfl: 0     insulin degludec (TRESIBA FLEXTOUCH) 100 UNIT/ML pen, 45-55 units subcutaneous daily, Disp: 60 mL, Rfl: 11     Insulin Pen Needle (PEN NEEDLES) 31G X 5 MM MISC, 1 Device 4 times daily, Disp: 200 each, Rfl: 10     ketoconazole (NIZORAL) 2 % external shampoo, APPLY TOPICALLY DAILY AS NEEDED FOR ITCHING OR IRRITATION. SEE MD AFTER 6 WEEKS., Disp: 120 mL, Rfl: 1     lisinopril (ZESTRIL) 40 MG tablet, TAKE 1 TABLET(40 MG) BY MOUTH DAILY, Disp: 90 tablet, Rfl: 0     MAGNESIUM GLYCINATE PLUS PO, Take 400 mg by mouth 2 times daily, Disp: , Rfl:      metFORMIN (GLUCOPHAGE-XR) 500 MG 24 hr tablet, TAKE 2 TABLETS BY MOUTH TWICE DAILY WITH MEALS, Disp: 360 tablet, Rfl: 3     Multiple Vitamins-Minerals (MULTIVITAMIN PO), Take 1 tablet by mouth daily , Disp: , Rfl:      Omega-3 Fatty Acids (OMEGA-3 FISH OIL PO), Take 1 g by mouth 2 times daily (with meals) , Disp: , Rfl:      omeprazole (PRILOSEC) 40 MG DR capsule, , Disp: , Rfl:       potassium chloride ER (KLOR-CON M) 10 MEQ CR tablet, Take 1 tablet (10 mEq) by mouth 2 times daily, Disp: 5 tablet, Rfl: 3     primidone (MYSOLINE) 50 MG tablet, TAKE 1 TABLET BY MOUTH THREE TIMES DAILY, Disp: , Rfl:      semaglutide (OZEMPIC, 1 MG/DOSE,) 2 MG/1.5ML pen, Inject 1 mg Subcutaneous every 7 days , on Wednesdays, Disp: 6 mL, Rfl: 5     silver sulfADIAZINE (SILVADENE) 1 % external cream, Apply topically daily, Disp: 50 g, Rfl: 1     tadalafil (CIALIS) 5 MG tablet, TAKE 1 TABLET BY MOUTH EVERY DAY AS NEEDED one hour before intercourse, Disp: 30 tablet, Rfl: 1     Allergies   Allergen Reactions     Bactrim [Sulfamethoxazole W/Trimethoprim] Nausea and Vomiting     Sulfa Drugs Nausea     Niacin Swelling     SIMCOR caused edema in extremities     Simvastatin Swelling     SIMCOR caused edema in extremities        Past Medical History:   Diagnosis Date     Cerebral infarction (H)     2010     Chronic infection      History of heartburn      Hyperlipidemia LDL goal <70      Hypertension      Numbness and tingling      Obesity      GILA (obstructive sleep apnea) 10/22/2018     PVD (peripheral vascular disease) (H)     s/p left partial toe amputation     Renal stone      Tremor      Type II or unspecified type diabetes mellitus without mention of complication, not stated as uncontrolled         ROS: 10 point review of symptoms are negative other than the symptoms noted above in the HPI.     Family History has been reviewed with the patient, there are no pertinent findings to presenting concern.     Past Surgical History:   Procedure Laterality Date     AMPUTATE FOOT Left 8/18/2016    Procedure: AMPUTATE FOOT;  Surgeon: Jack Younger DPM;  Location: [facility]     AMPUTATE TOE(S) Right 2/1/2016    Procedure: AMPUTATE TOE(S);  Surgeon: Rachelle Manriquez DPM, Pod;  Location: [facility] OR     AMPUTATE TOE(S) Right 8/2/2019    Procedure: Right fourth toe partial amputation for treatment of osteomyelitis.;  Surgeon: Aren  "Jack HOPKINS DPM;  Location: [facility] OR     AMPUTATE TOE(S) Left 11/8/2019    Procedure: Left second toe amputation at the metatarsophalangeal joint.;  Surgeon: Rachelle Manriquez DPM, Podiatry/Foot and Ankle Surgery;  Location: [facility] OR     ANGIOGRAM       COLONOSCOPY       COMBINED CYSTOSCOPY, RETROGRADES, URETEROSCOPY, INSERT STENT Left 8/21/2016    Procedure: COMBINED CYSTOSCOPY, RETROGRADES, URETEROSCOPY, INSERT STENT;  Surgeon: rAtemio Valenzuela MD;  Location: [facility] OR     COMBINED CYSTOSCOPY, RETROGRADES, URETEROSCOPY, LASER HOLMIUM LITHOTRIPSY URETER(S), INSERT STENT Left 10/3/2016    Procedure: COMBINED CYSTOSCOPY, RETROGRADES, URETEROSCOPY, LASER HOLMIUM LITHOTRIPSY URETER(S), INSERT STENT;  Surgeon: Artemio Valenzuela MD;  Location: [facility] OR     EXTRACORPOREAL SHOCK WAVE LITHOTRIPSY (ESWL) Left 9/8/2016    Procedure: EXTRACORPOREAL SHOCK WAVE LITHOTRIPSY (ESWL);  Surgeon: Artemio Valenzuela MD;  Location: [facility] OR     RECESSION GASTROCNEMIUS Right 2/1/2016    Procedure: RECESSION GASTROCNEMIUS;  Surgeon: Rachelle Manriquez DPM, Pod;  Location: [facility] OR        Social History     Tobacco Use     Smoking status: Never Smoker     Smokeless tobacco: Never Used   Vaping Use     Vaping Use: Never used   Substance Use Topics     Alcohol use: Yes     Alcohol/week: 0.0 standard drinks     Comment: rare---red wine 3x per month     Drug use: No        OBJECTIVE:   BP (!) 157/73   Pulse 71   Ht 5' 11\" (1.803 m)   Wt 244 lb (110.7 kg)   SpO2 95%   BMI 34.03 kg/m     Body mass index is 34.03 kg/m .     Imaging:     XR THORACIC SPINE THREE VIEWS 1/3/2022 9:41 AM     INDICATION: Acute midline thoracic back pain     COMPARISON: None.                                                                      IMPRESSION: Broad, mild convex right thoracic curvature. Mild  chronic-appearing height loss of several mid to lower thoracic  vertebral bodies. No acute fracture is evident. There is associated  mild interspace and endplate " degeneration.     JANIS VOSS MD     Full radiological report in chart. Imaging was reviewed with with patient today.     Exam:   CN II-XII grossly intact, alert and appropriate with conversation and following commands.   Gait is non-antalgic. Able to walk on toes and heels without difficulty.   Cervical spine is non tender to palpation.  Bilateral bicep 2/4 and tricep reflexes 1/4. Sensation intact throughout upper extremities.     UE muscle strength  Right  Left    Deltoid  5/5  5/5    Biceps  5/5  5/5    Triceps  5/5  5/5    Hand intrinsics  5/5  5/5    Hand grasp  5/5  5/5    Gonsalez signs  neg  neg      Thoracic spine is tender to palpation upper spinous process and paraspinous muscles   Lumbar spine is non tender to palpation.  Intact sensation throughout lower extremities.   Bilateral patellar 2/4 and achilles reflex 1/4.     LE muscle strength  Right  Left    Iliopsoas (hip flexion)  5/5  5/5    Quad (knee extension)  5/5  5/5    Hamstring (knee flexion)  5/5  5/5    Gastrocnemius (PF)  5/5  5/5    Tibialis Ant. (DF)  5/5  5/5    EHL  5/5  5/5      Negative for clonus.   Calves are soft and non-tender bilaterally.     ASSESSMENT/PLAN:     Crispin Rojas is a pleasant 61 year old male who presents to the clinic today for consultation on upper thoracic midline back pain. He is referred to the Neurosurgery Clinic by Dr. Serna in Primary Care. The patient's most recent imaging was reviewed with him today. On exam, he is noted to have appropriate strength, sensation and range of motion. He has not attempted conservative management.     Based on his physical exam, imaging review, and past treatments, we feel that it would be in his best interest to try a conservative approach by participating in a physical therapy program. We also discussed Pain management referral for comprehensive services.     Should symptoms persist or worsen despite conservative measures, recommend he contact our office and we  will consider further imaging with MRI. Pat in agreement with all above stated plans.     Respectfully,     Ricarda Zayas PA-C  [facility] Neurosurgery  [address] [address]    Tel [phone]

## 2022-01-05 NOTE — PATIENT INSTRUCTIONS
-Physical therapy referral placed. They will call you to schedule.   -Pain management referral placed. They will call you to schedule.   -Should symptoms persist or worsen despite physical therapy, contact our office and we will consider thoracic MRI for further evaluation     Ricarda MORGAN [facility] Neurosurgery  [address] [address]    Tel [phone]

## 2022-01-06 ENCOUNTER — OFFICE VISIT (OUTPATIENT)
Dept: PODIATRY | Facility: CLINIC | Age: 62
End: 2022-01-06
Payer: COMMERCIAL

## 2022-01-06 VITALS
BODY MASS INDEX: 34.16 KG/M2 | HEIGHT: 71 IN | WEIGHT: 244 LBS | DIASTOLIC BLOOD PRESSURE: 79 MMHG | SYSTOLIC BLOOD PRESSURE: 157 MMHG

## 2022-01-06 VITALS
HEART RATE: 59 BPM | TEMPERATURE: 98.8 F | RESPIRATION RATE: 18 BRPM | DIASTOLIC BLOOD PRESSURE: 71 MMHG | SYSTOLIC BLOOD PRESSURE: 161 MMHG | OXYGEN SATURATION: 96 %

## 2022-01-06 DIAGNOSIS — S91.111A LACERATION OF GREAT TOE OF RIGHT FOOT, FOREIGN BODY PRESENCE UNSPECIFIED, NAIL DAMAGE STATUS UNSPECIFIED, INITIAL ENCOUNTER: Primary | ICD-10-CM

## 2022-01-06 DIAGNOSIS — L84 PRE-ULCERATIVE CORN OR CALLOUS: ICD-10-CM

## 2022-01-06 DIAGNOSIS — M20.41 HAMMER TOE OF RIGHT FOOT: ICD-10-CM

## 2022-01-06 DIAGNOSIS — E11.42 TYPE 2 DIABETES MELLITUS WITH PERIPHERAL NEUROPATHY (H): ICD-10-CM

## 2022-01-06 PROCEDURE — 11055 PARING/CUTG B9 HYPRKER LES 1: CPT | Mod: Q7 | Performed by: PODIATRIST

## 2022-01-06 PROCEDURE — 99213 OFFICE O/P EST LOW 20 MIN: CPT | Mod: 25 | Performed by: PODIATRIST

## 2022-01-06 ASSESSMENT — ENCOUNTER SYMPTOMS
FEVER: 0
VOMITING: 0
SHORTNESS OF BREATH: 0
WOUND: 1

## 2022-01-06 ASSESSMENT — MIFFLIN-ST. JEOR: SCORE: 1943.91

## 2022-01-06 NOTE — LETTER
1/6/2022         RE: Crispin Rojas  [id number]  [place] [facility] [place] [address]        Dear Colleague,    Thank you for referring your patient, Crispin Rojas, to the [facility] PODIATRY. Please see a copy of my visit note below.    ASSESSMENT:  Encounter Diagnoses   Name Primary?     Laceration of great toe of right foot, foreign body presence unspecified, nail damage status unspecified, initial encounter Yes     Pre-ulcerative corn or callous      Hammer toe of right foot      Type 2 diabetes mellitus with peripheral neuropathy (H)      MEDICAL DECISION MAKING:  His injury is consistent with a laceration of a pre-existing patient or in the same location.    Vaseline to keep the injured area clean.  Keep a light dressing on the area and monitor for signs of infection.    Suggested he either consider wearing one of his surgical boots or shoes versus a stiff soled shoe.  This will help reduce movement around the right first metatarsal phalangeal joint and stress on the laceration.    He is very active and I suggested lower impact activity for the next 2 weeks.    Follow-up in 2 weeks for removal of the sutures.    Using #15 scalpel, the hyperkeratotic tissue at the distal right second toe, region of prior ulceration, was trimmed down.  No bleeding.      Disclaimer: This note consists of symbols derived from keyboarding, dictation and/or voice recognition software. As a result, there may be errors in the script that have gone undetected. Please consider this when interpreting information found in this chart.    Valentino Molina, SHRUTHI, FACFAS, [facility] Department of Podiatry/Foot & Ankle Surgery      ____________________________________________________________________    HPI:       Crispin follows up today 3 weeks prior to planned appointment.  He was working at his [employer] yesterday, changing light bulbs and using a ladder.  He missed a rung on the ladder and slipped.  He forcefully dorsiflex  the right hallux.  There was significant bleeding.  He presented to the emergency department.  A transverse laceration on the plantar aspect of the hallux was washed out and sutured.  X-rays were taken.      Past Medical History:   Diagnosis Date     Cerebral infarction (H)     2010     Chronic infection      History of heartburn      Hyperlipidemia LDL goal <70      Hypertension      Numbness and tingling      Obesity      GILA (obstructive sleep apnea) 10/22/2018     PVD (peripheral vascular disease) (H)     s/p left partial toe amputation     Renal stone      Tremor      Type II or unspecified type diabetes mellitus without mention of complication, not stated as uncontrolled    *  *  Past Surgical History:   Procedure Laterality Date     AMPUTATE FOOT Left 8/18/2016    Procedure: AMPUTATE FOOT;  Surgeon: Jack Younger DPM;  [facility]     AMPUTATE TOE(S) Right 2/1/2016    Procedure: AMPUTATE TOE(S);  Surgeon: Rachelle Manriquez DPM, Pod;  Location: [facility] OR     AMPUTATE TOE(S) Right 8/2/2019    Procedure: Right fourth toe partial amputation for treatment of osteomyelitis.;  Surgeon: Jack Younger DPM;  Location: [facility] OR     AMPUTATE TOE(S) Left 11/8/2019    Procedure: Left second toe amputation at the metatarsophalangeal joint.;  Surgeon: Rachelle Manriquez DPM, Podiatry/Foot and Ankle Surgery;  Location:  OR     ANGIOGRAM       COLONOSCOPY       COMBINED CYSTOSCOPY, RETROGRADES, URETEROSCOPY, INSERT STENT Left 8/21/2016    Procedure: COMBINED CYSTOSCOPY, RETROGRADES, URETEROSCOPY, INSERT STENT;  Surgeon: Artemio Valenzuela MD;  Location:  OR     COMBINED CYSTOSCOPY, RETROGRADES, URETEROSCOPY, LASER HOLMIUM LITHOTRIPSY URETER(S), INSERT STENT Left 10/3/2016    Procedure: COMBINED CYSTOSCOPY, RETROGRADES, URETEROSCOPY, LASER HOLMIUM LITHOTRIPSY URETER(S), INSERT STENT;  Surgeon: Artemio Valenzuela MD;  Location:  OR     EXTRACORPOREAL SHOCK WAVE LITHOTRIPSY (ESWL) Left 9/8/2016    Procedure:  EXTRACORPOREAL SHOCK WAVE LITHOTRIPSY (ESWL);  Surgeon: Artemio Valenzuela MD;  Location: [facility] OR     RECESSION GASTROCNEMIUS Right 2/1/2016    Procedure: RECESSION GASTROCNEMIUS;  Surgeon: Rachelle Manriquez, DPM, Pod;  Location: [facility] OR   *  *  Current Outpatient Medications   Medication Sig Dispense Refill     amLODIPine (NORVASC) 5 MG tablet Take one tablet twice daily  SEE MD THIS QUARTER 180 tablet 1     aspirin (ASA) 325 MG tablet Take 325 mg by mouth daily       atorvastatin (LIPITOR) 40 MG tablet Take one tablet daily SEE PROVIDER FOR NEXT REFILL 90 tablet 0     blood glucose (NO BRAND SPECIFIED) lancets standard Use to test blood sugar 3 times daily or as directed. 300 each 3     blood glucose monitoring (NO BRAND SPECIFIED) meter device kit Use to test blood sugar 3 times daily or as directed. 1 kit 0     blood glucose monitoring (NO BRAND SPECIFIED) test strip Use to test blood sugars 3 times daily or as directed 300 strip 3     Cholecalciferol (VITAMIN D3 PO) Take 1,000 Units by mouth daily        Co-Enzyme Q10 100 MG CAPS Take 100 mg by mouth daily        collagenase (SANTYL) 250 UNIT/GM external ointment Apply topically daily 30 g 1     Continuous Blood Gluc Sensor (FREESTYLE LUCERO 14 DAY SENSOR) [facility] [place] ONE EVERY 14 DAYS 2 each 3     furosemide (LASIX) 20 MG tablet Take 1 tablet (20 mg) by mouth daily as needed (for leg swelling) As needed for edema 30 tablet 1     gabapentin (NEURONTIN) 100 MG capsule Take 300 mg by mouth 3 times daily       hydrochlorothiazide (MICROZIDE) 12.5 MG capsule Take 1 capsule (12.5 mg) by mouth every morning 90 capsule 1     Insulin Aspart FlexPen 100 UNIT/ML SOPN INJECT AS DIRECTED PER SLIDING SCALE BEFORE EACH MEAL. MAX 70 UNITS DAILY 15 mL 0     insulin degludec (TRESIBA FLEXTOUCH) 100 UNIT/ML pen 45-55 units subcutaneous daily 60 mL 11     Insulin Pen Needle (PEN NEEDLES) 31G X 5 MM MISC 1 Device 4 times daily 200 each 10     ketoconazole (NIZORAL) 2 % external shampoo  "APPLY TOPICALLY DAILY AS NEEDED FOR ITCHING OR IRRITATION. SEE MD AFTER 6 WEEKS. 120 mL 1     lisinopril (ZESTRIL) 40 MG tablet TAKE 1 TABLET(40 MG) BY MOUTH DAILY 90 tablet 0     MAGNESIUM GLYCINATE PLUS PO Take 400 mg by mouth 2 times daily       metFORMIN (GLUCOPHAGE-XR) 500 MG 24 hr tablet TAKE 2 TABLETS BY MOUTH TWICE DAILY WITH MEALS 360 tablet 3     Multiple Vitamins-Minerals (MULTIVITAMIN PO) Take 1 tablet by mouth daily        Omega-3 Fatty Acids (OMEGA-3 FISH OIL PO) Take 1 g by mouth 2 times daily (with meals)        omeprazole (PRILOSEC) 40 MG DR capsule        potassium chloride ER (KLOR-CON M) 10 MEQ CR tablet Take 1 tablet (10 mEq) by mouth 2 times daily 5 tablet 3     primidone (MYSOLINE) 50 MG tablet TAKE 1 TABLET BY MOUTH THREE TIMES DAILY       semaglutide (OZEMPIC, 1 MG/DOSE,) 2 MG/1.5ML pen Inject 1 mg Subcutaneous every 7 days , on Wednesdays 6 mL 5     silver sulfADIAZINE (SILVADENE) 1 % external cream Apply topically daily 50 g 1     tadalafil (CIALIS) 5 MG tablet TAKE 1 TABLET BY MOUTH EVERY DAY AS NEEDED one hour before intercourse 30 tablet 1     Glucagon, rDNA, (GLUCAGON EMERGENCY) 1 MG KIT Inject 1 mg into the muscle once for 1 dose 1 kit 0         EXAM:    Vitals: BP (!) 157/79   Ht 1.803 m (5' 11\")   Wt 110.7 kg (244 lb)   BMI 34.03 kg/m    BMI: Body mass index is 34.03 kg/m .    Vascular:  Pedal pulses are palpable for both the DP and PT arteries.  CFT < 3 sec.  No edema.       Neuro: Light touch sensation is grossly diminished, bilateral foot, to the L4, L5, S1 distributions  No evidence of weakness, spasticity, or contracture in the lower extremities.      Derm:   1) Right hallux: There is a transverse laceration/previous fissure on the plantar aspect of the right hallux.  Six or so sutures are in place.  No surrounding erythema.  Mild edema.    2) Right 2nd toe:   Thick hyperkeratotic skin with no underlying ulceration.         Musculoskeletal: Previous amputation of the left " hallux.  Previous partial amputation of the right hallux. Remaining lesser digits are contracted.  The bilateral second toes are rigid.    EXAM: XR FOOT RIGHT G/E 3 VIEWS  LOCATION: [facility]  DATE/TIME: 1/5/2022 9:50 PM     INDICATION: Laceration and injury with possible glass foreign body. Neuropathy of the foot.  COMPARISON: None.                                                              IMPRESSION: No radiopaque foreign body. No subcutaneous emphysema. Partial amputation of the first ray at the level of the distal aspect of the proximal phalanx. Partial amputation of the fourth ray level of the PIP joint. Atheromatous calcification.      Again, thank you for allowing me to participate in the care of your patient.        Sincerely,        Valentino Molina DPM

## 2022-01-06 NOTE — PATIENT INSTRUCTIONS
Thank you for choosing [facility] Podiatry / Foot & Ankle Surgery!    DR. CANDELARIA'S CLINIC LOCATIONS     [facility] SURGERY: [phone]   [address] [place] [address] APPOINTMENTS: [phone]   [place] [address] BILLING QUESTIONS: [phone]  [phone] RADIOLOGY: [phone]       [place]    [id number]    [place], [place] [address] [phone]  [phone]      Follow up: 2 weeks    Next steps:   Consider wearing surgical shoe or boot.   Check injury and dressing changes every 1-2 days.   Avoid bending great toe, and focus on lower impact activities.     SIGNS OF INFECTION    expanding redness around the wound     yellow or greenish-colored pus or cloudy wound drainage     red streaking spreading from the wound     increased swelling, tenderness, or pain around the wound     fever  *If you notice any of these signs of infection, call us right away!          Flu vaccines are now available at all [facility] clinics and retail pharmacies across the [facility]. Appointments are required for clinic locations. To schedule an appointment online, please log into [email or web address] or create an account if you are a new user. You can also call [phone], or simply walk in at one of the [facility] retail pharmacy locations.

## 2022-01-06 NOTE — PROGRESS NOTES
ASSESSMENT:  Encounter Diagnoses   Name Primary?     Laceration of great toe of right foot, foreign body presence unspecified, nail damage status unspecified, initial encounter Yes     Pre-ulcerative corn or callous      Hammer toe of right foot      Type 2 diabetes mellitus with peripheral neuropathy (H)      MEDICAL DECISION MAKING:  His injury is consistent with a laceration of a pre-existing fissure in the same location.    He is advised to keep the injured area clean, keep a light dressing on the area and monitor for signs of infection.    I suggested he either consider wearing one of his surgical boots or shoes versus a stiff soled shoe.  This will help reduce movement around the right first metatarsal phalangeal joint and stress on the laceration.    He is very active and I suggested lower impact activity for the next 2 weeks.    Follow-up in 2 weeks for removal of the sutures.    Using #15 scalpel, the hyperkeratotic tissue at the distal right second toe, region of prior ulceration, was trimmed down.  No bleeding.      Disclaimer: This note consists of symbols derived from keyboarding, dictation and/or voice recognition software. As a result, there may be errors in the script that have gone undetected. Please consider this when interpreting information found in this chart.    Valentino Molina DPM, FACFAS, MS    [place] [facility]le [facility]      ____________________________________________________________________    HPI:       Crispin follows up today 3 weeks prior to planned appointment.  He was working at his [employer] yesterday, changing light bulbs and using a ladder.  He missed a rung on the ladder and slipped.  He forcefully dorsiflex the right hallux.  There was significant bleeding.  He presented to the emergency department.  A transverse laceration on the plantar aspect of the hallux was washed out and sutured.  X-rays were taken.      Past Medical History:   Diagnosis Date      Cerebral infarction (H)     2010     Chronic infection      History of heartburn      Hyperlipidemia LDL goal <70      Hypertension      Numbness and tingling      Obesity      GILA (obstructive sleep apnea) 10/22/2018     PVD (peripheral vascular disease) (H)     s/p left partial toe amputation     Renal stone      Tremor      Type II or unspecified type diabetes mellitus without mention of complication, not stated as uncontrolled    *  *  Past Surgical History:   Procedure Laterality Date     AMPUTATE FOOT Left 8/18/2016    Procedure: AMPUTATE FOOT;  Surgeon: Jack Younger DPM;  Location: [facility]     AMPUTATE TOE(S) Right 2/1/2016    Procedure: AMPUTATE TOE(S);  Surgeon: Rachelle Manriquez DPM, Pod;  Location: [facility]     AMPUTATE TOE(S) Right 8/2/2019    Procedure: Right fourth toe partial amputation for treatment of osteomyelitis.;  Surgeon: Jack Younger DPM;  Location: [facility]     AMPUTATE TOE(S) Left 11/8/2019    Procedure: Left second toe amputation at the metatarsophalangeal joint.;  Surgeon: Rachelle Manriquez DPM, Podiatry/Foot and Ankle Surgery;  Location: [facility]     ANGIOGRAM       COLONOSCOPY       COMBINED CYSTOSCOPY, RETROGRADES, URETEROSCOPY, INSERT STENT Left 8/21/2016    Procedure: COMBINED CYSTOSCOPY, RETROGRADES, URETEROSCOPY, INSERT STENT;  Surgeon: Artemio Valenzuela MD;  Location: [facility]     COMBINED CYSTOSCOPY, RETROGRADES, URETEROSCOPY, LASER HOLMIUM LITHOTRIPSY URETER(S), INSERT STENT Left 10/3/2016    Procedure: COMBINED CYSTOSCOPY, RETROGRADES, URETEROSCOPY, LASER HOLMIUM LITHOTRIPSY URETER(S), INSERT STENT;  Surgeon: Artemio Valenzuela MD;  Location: [facility]     EXTRACORPOREAL SHOCK WAVE LITHOTRIPSY (ESWL) Left 9/8/2016    Procedure: EXTRACORPOREAL SHOCK WAVE LITHOTRIPSY (ESWL);  Surgeon: Artemio Valenzuela MD;  Location: [facility]     RECESSION GASTROCNEMIUS Right 2/1/2016    Procedure: RECESSION GASTROCNEMIUS;  Surgeon: Rachelle Manriquez DPM, Pod;  Location: [facility]    *  *  Current Outpatient Medications   Medication Sig Dispense Refill     amLODIPine (NORVASC) 5 MG tablet Take one tablet twice daily  SEE MD THIS QUARTER 180 tablet 1     aspirin (ASA) 325 MG tablet Take 325 mg by mouth daily       atorvastatin (LIPITOR) 40 MG tablet Take one tablet daily SEE PROVIDER FOR NEXT REFILL 90 tablet 0     blood glucose (NO BRAND SPECIFIED) lancets standard Use to test blood sugar 3 times daily or as directed. 300 each 3     blood glucose monitoring (NO BRAND SPECIFIED) meter device kit Use to test blood sugar 3 times daily or as directed. 1 kit 0     blood glucose monitoring (NO BRAND SPECIFIED) test strip Use to test blood sugars 3 times daily or as directed 300 strip 3     Cholecalciferol (VITAMIN D3 PO) Take 1,000 Units by mouth daily        Co-Enzyme Q10 100 MG CAPS Take 100 mg by mouth daily        collagenase (SANTYL) 250 UNIT/GM external ointment Apply topically daily 30 g 1     Continuous Blood Gluc Sensor (FREESTYLE LUCERO 14 DAY SENSOR) [facility] USE ONE EVERY 14 DAYS 2 each 3     furosemide (LASIX) 20 MG tablet Take 1 tablet (20 mg) by mouth daily as needed (for leg swelling) As needed for edema 30 tablet 1     gabapentin (NEURONTIN) 100 MG capsule Take 300 mg by mouth 3 times daily       hydrochlorothiazide (MICROZIDE) 12.5 MG capsule Take 1 capsule (12.5 mg) by mouth every morning 90 capsule 1     Insulin Aspart FlexPen 100 UNIT/ML SOPN INJECT AS DIRECTED PER SLIDING SCALE BEFORE EACH MEAL. MAX 70 UNITS DAILY 15 mL 0     insulin degludec (TRESIBA FLEXTOUCH) 100 UNIT/ML pen 45-55 units subcutaneous daily 60 mL 11     Insulin Pen Needle (PEN NEEDLES) 31G X 5 MM MISC 1 Device 4 times daily 200 each 10     ketoconazole (NIZORAL) 2 % external shampoo APPLY TOPICALLY DAILY AS NEEDED FOR ITCHING OR IRRITATION. SEE MD AFTER 6 WEEKS. 120 mL 1     lisinopril (ZESTRIL) 40 MG tablet TAKE 1 TABLET(40 MG) BY MOUTH DAILY 90 tablet 0     MAGNESIUM GLYCINATE PLUS PO Take 400 mg by mouth 2 times  "daily       metFORMIN (GLUCOPHAGE-XR) 500 MG 24 hr tablet TAKE 2 TABLETS BY MOUTH TWICE DAILY WITH MEALS 360 tablet 3     Multiple Vitamins-Minerals (MULTIVITAMIN PO) Take 1 tablet by mouth daily        Omega-3 Fatty Acids (OMEGA-3 FISH OIL PO) Take 1 g by mouth 2 times daily (with meals)        omeprazole (PRILOSEC) 40 MG DR capsule        potassium chloride ER (KLOR-CON M) 10 MEQ CR tablet Take 1 tablet (10 mEq) by mouth 2 times daily 5 tablet 3     primidone (MYSOLINE) 50 MG tablet TAKE 1 TABLET BY MOUTH THREE TIMES DAILY       semaglutide (OZEMPIC, 1 MG/DOSE,) 2 MG/1.5ML pen Inject 1 mg Subcutaneous every 7 days , on Wednesdays 6 mL 5     silver sulfADIAZINE (SILVADENE) 1 % external cream Apply topically daily 50 g 1     tadalafil (CIALIS) 5 MG tablet TAKE 1 TABLET BY MOUTH EVERY DAY AS NEEDED one hour before intercourse 30 tablet 1     Glucagon, rDNA, (GLUCAGON EMERGENCY) 1 MG KIT Inject 1 mg into the muscle once for 1 dose 1 kit 0         EXAM:    Vitals: BP (!) 157/79   Ht 1.803 m (5' 11\")   Wt 110.7 kg (244 lb)   BMI 34.03 kg/m    BMI: Body mass index is 34.03 kg/m .    Vascular:  Pedal pulses are palpable for both the DP and PT arteries.  CFT < 3 sec.  No edema.       Neuro: Light touch sensation is grossly diminished, bilateral foot, to the L4, L5, S1 distributions  No evidence of weakness, spasticity, or contracture in the lower extremities.      Derm:   1) Right hallux: There is a transverse laceration/previous fissure on the plantar aspect of the right hallux.  Six or so sutures are in place.  No surrounding erythema.  Mild edema.    2) Right 2nd toe:   Thick hyperkeratotic skin with no underlying ulceration.         Musculoskeletal: Previous amputation of the left hallux.  Previous partial amputation of the right hallux. Remaining lesser digits are contracted.  The bilateral second toes are rigid.    EXAM: XR FOOT RIGHT G/E 3 VIEWS  LOCATION: [facility]  DATE/TIME: 1/5/2022 " 9:50 PM     INDICATION: Laceration and injury with possible glass foreign body. Neuropathy of the foot.  COMPARISON: None.                                                              IMPRESSION: No radiopaque foreign body. No subcutaneous emphysema. Partial amputation of the first ray at the level of the distal aspect of the proximal phalanx. Partial amputation of the fourth ray level of the PIP joint. Atheromatous calcification.

## 2022-01-06 NOTE — ED NOTES
Emergency Department Technician Wound Irrigation Note:    1/5/2022    11:25 PM      Wound location:  Right big toe    Irrigation Fluid: Normal Saline    Estimated Irrigation Volume (60 mL fluid per cm): 900 mL    Cassy Mullen

## 2022-01-06 NOTE — DISCHARGE INSTRUCTIONS
Please have the sutures removed in 10 days.    Discharge Instructions  Laceration (Cut)    You were seen today for a laceration (cut).  Your provider examined your laceration for any problems such a buried foreign body (like glass, a splinter, or gravel), or injury to blood vessels, tendons, and nerves.  Your provider may have also rinsed and/or scrubbed your laceration to help prevent an infection. It may not be possible to find all problems with your laceration on the first visit; occasionally foreign bodies or a tendon injury can go undetected.    Your laceration may have been closed in one of several ways:  No closure: many wounds will heal just fine without closure.  Stitches: regular stitches that require removal.  Staples: skin staples are often used in the scalp/head.  Wound adhesive (glue): skin glue can be used for certain lacerations and doesn t require removal.  Wound strips (aka Butterfly bandages or steri-strips): these are bandages that help to close a wound.  Absorbable stitches:  dissolving  stitches that go away on their own and usually don t require removal.    A small percentage of wounds will develop an infection regardless of how well the wound is cared for. Antibiotics are generally not indicated to prevent an infection so are only given for a small number of high-risk wounds. Some lacerations are too high risk to close, and are left open to heal because closure can increase the likelihood that an infection will develop.    Remember that all lacerations, no matter how expertly repaired, will cause scarring. We consider many factors, techniques, and materials, in our efforts to provide the best possible cosmetic outcome.    Generally, every Emergency Department visit should have a follow-up clinic visit with either a primary or a specialty clinic/provider. Please follow-up as instructed by your emergency provider today.     Return to the Emergency Department right away if:  You have more  redness, swelling, pain, drainage (pus), a bad smell, or red streaking from your laceration as these symptoms could indicate an infection.  You have a fever of 100.4 F or more.  You have bleeding that you cannot stop at home. If your cut starts to bleed, hold pressure on the bleeding area with a clean cloth or put pressure over the bandage.  If the bleeding does not stop after using constant pressure for 30 minutes, you should return to the Emergency Department for further treatment.  An area past the laceration is cool, pale, or blue compared with the other side, or has a slower return of color when squeezed.  Your dressing seems too tight or starts to get uncomfortable or painful. For children, signs of a problem might be irritability or restlessness.  You have loss of normal function or use of an area, such as being unable to straighten or bend a finger normally.  You have a numb area past the laceration.    Return to the Emergency Department or see your regular provider if:  The laceration starts to come open.   You have something coming out of the cut or a feeling that there is something in the laceration.  Your wound will not heal, or keeps breaking open. There can always be glass, wood, dirt or other things in any wound.  They will not always show up, even on x-rays.  If a wound does not heal, this may be why, and it is important to follow-up with your regular provider.    Home Care:  Take your dressing off in 12-24 hours, or as instructed by your provider, to check your laceration. Remove the dressing sooner if it seems too tight or painful, or if it is getting numb, tingly, or pale past the dressing.  Gently wash your laceration 1-2 times daily with clean water and mild soap. It is okay to shower or run clean water over the laceration, but do not let the laceration soak in water (no swimming).  If your laceration was closed with wound adhesive or strips: pat it dry and leave it open to the air. For all  other repairs: after you wash your laceration, or at least 2 times a day, apply antibiotic ointment (such as Neosporin  or Bacitracin ) to the laceration, then cover it with a Band-Aid  or gauze.  Keep the laceration clean. Wear gloves or other protective clothing if you are around dirt.    Follow-up for removal:  If your wound was closed with staples or regular stitches, they need to be removed according to the instructions and timeline specified by your provider today.  If your wound was closed with absorbable ( dissolving ) sutures, they should fall out, dissolve, or not be visible in about one week. If they are still visible, then they should be removed according to the instructions and timeline specified by your provider today.    Scars:  To help minimize scarring:  Wear sunscreen over the healed laceration when out in the sun.  Massage the area regularly once healed.  You may apply Vitamin E to the healed wound.  Wait. Scars improve in appearance over months and years.    If you were given a prescription for medicine here today, be sure to read all of the information (including the package insert) that comes with your prescription.  This will include important information about the medicine, its side effects, and any warnings that you need to know about.  The pharmacist who fills the prescription can provide more information and answer questions you may have about the medicine.  If you have questions or concerns that the pharmacist cannot address, please call or return to the Emergency Department.       Remember that you can always come back to the Emergency Department if you are not able to see your regular provider in the amount of time listed above, if you get any new symptoms, or if there is anything that worries you.

## 2022-01-07 ENCOUNTER — VIRTUAL VISIT (OUTPATIENT)
Dept: ENDOCRINOLOGY | Facility: CLINIC | Age: 62
End: 2022-01-07
Payer: COMMERCIAL

## 2022-01-07 DIAGNOSIS — E11.42 TYPE 2 DIABETES MELLITUS WITH PERIPHERAL NEUROPATHY (H): Primary | ICD-10-CM

## 2022-01-07 PROBLEM — I69.998 OTHER SEQUELAE FOLLOWING UNSPECIFIED CEREBROVASCULAR DISEASE: Status: ACTIVE | Noted: 2021-12-02

## 2022-01-07 PROCEDURE — 99213 OFFICE O/P EST LOW 20 MIN: CPT | Mod: 95 | Performed by: NURSE PRACTITIONER

## 2022-01-07 NOTE — ED PROVIDER NOTES
History     Chief Complaint:  Toe laceration    HPI     Crispin Rojas is a 61 year old male who presents with right great toe laceration.  Patient has a history of diabetic neuropathy.  He was walking and mis-stepped on a stair.  He denied overt trauma at that time.  He was also aware that there was an area of broken glass and may have lacerated his toe on the glass.  At 1 point, he looked down and his foot was bloody prompting his ED evaluation.  This occurred approximately 3 to 4 hours prior to arrival.  He denies any additional areas of trauma.    Review of Systems   Constitutional: Negative for fever.   Respiratory: Negative for shortness of breath.    Gastrointestinal: Negative for vomiting.   Skin: Positive for wound.   All other systems reviewed and are negative.    Allergies:  Bactrim [Sulfamethoxazole W/Trimethoprim]  Sulfa Drugs  Niacin  Simvastatin      Medications:   amLODIPine (NORVASC) 5 MG tablet  aspirin (ASA) 325 MG tablet  atorvastatin (LIPITOR) 40 MG tablet  blood glucose (NO BRAND SPECIFIED) lancets standard  blood glucose monitoring (NO BRAND SPECIFIED) meter device kit  blood glucose monitoring (NO BRAND SPECIFIED) test strip  Cholecalciferol (VITAMIN D3 PO)  Co-Enzyme Q10 100 MG CAPS  collagenase (SANTYL) 250 UNIT/GM external ointment  Continuous Blood Gluc Sensor (FREESTYLE LUCERO 14 DAY SENSOR) MISC  furosemide (LASIX) 20 MG tablet  gabapentin (NEURONTIN) 100 MG capsule  Glucagon, rDNA, (GLUCAGON EMERGENCY) 1 MG KIT  hydrochlorothiazide (MICROZIDE) 12.5 MG capsule  Insulin Aspart FlexPen 100 UNIT/ML SOPN  insulin degludec (TRESIBA FLEXTOUCH) 100 UNIT/ML pen  Insulin Pen Needle (PEN NEEDLES) 31G X 5 MM MISC  ketoconazole (NIZORAL) 2 % external shampoo  lisinopril (ZESTRIL) 40 MG tablet  MAGNESIUM GLYCINATE PLUS PO  metFORMIN (GLUCOPHAGE-XR) 500 MG 24 hr tablet  Multiple Vitamins-Minerals (MULTIVITAMIN PO)  Omega-3 Fatty Acids (OMEGA-3 FISH OIL PO)  omeprazole (PRILOSEC) 40 MG   capsule  potassium chloride ER (KLOR-CON M) 10 MEQ CR tablet  primidone (MYSOLINE) 50 MG tablet  semaglutide (OZEMPIC, 1 MG/DOSE,) 2 MG/1.5ML pen  silver sulfADIAZINE (SILVADENE) 1 % external cream  tadalafil (CIALIS) 5 MG tablet        Past Medical History:    Past Medical History:   Diagnosis Date     Cerebral infarction (H)      Chronic infection      History of heartburn      Hyperlipidemia LDL goal <70      Hypertension      Numbness and tingling      Obesity      GILA (obstructive sleep apnea) 10/22/2018     PVD (peripheral vascular disease) (H)      Renal stone      Tremor      Type II or unspecified type diabetes mellitus without mention of complication, not stated as uncontrolled        Past Surgical History:    Past Surgical History:   Procedure Laterality Date     AMPUTATE FOOT Left 8/18/2016    Procedure: AMPUTATE FOOT;  Surgeon: Jack Younger DPM;  Location: [facility]     AMPUTATE TOE(S) Right 2/1/2016    Procedure: AMPUTATE TOE(S);  Surgeon: Rachelle Manriquez DPM, Pod;  Location: [facility]     AMPUTATE TOE(S) Right 8/2/2019    Procedure: Right fourth toe partial amputation for treatment of osteomyelitis.;  Surgeon: Jack Younger DPM;  Location: [facility]     AMPUTATE TOE(S) Left 11/8/2019    Procedure: Left second toe amputation at the metatarsophalangeal joint.;  Surgeon: Rachelle Manriquez DPM, Podiatry/Foot and Ankle Surgery;  Location: [facility]     ANGIOGRAM       COLONOSCOPY       COMBINED CYSTOSCOPY, RETROGRADES, URETEROSCOPY, INSERT STENT Left 8/21/2016    Procedure: COMBINED CYSTOSCOPY, RETROGRADES, URETEROSCOPY, INSERT STENT;  Surgeon: Artemio Valenzuela MD;  Location:  OR     COMBINED CYSTOSCOPY, RETROGRADES, URETEROSCOPY, LASER HOLMIUM LITHOTRIPSY URETER(S), INSERT STENT Left 10/3/2016    Procedure: COMBINED CYSTOSCOPY, RETROGRADES, URETEROSCOPY, LASER HOLMIUM LITHOTRIPSY URETER(S), INSERT STENT;  Surgeon: Artemio Valenzuela MD;  Location:  OR     EXTRACORPOREAL SHOCK WAVE LITHOTRIPSY  (ESWL) Left 9/8/2016    Procedure: EXTRACORPOREAL SHOCK WAVE LITHOTRIPSY (ESWL);  Surgeon: Artemio Valenzuela MD;  Location: [facility]     RECESSION GASTROCNEMIUS Right 2/1/2016    Procedure: RECESSION GASTROCNEMIUS;  Surgeon: Rachelle Manriquez, DPM, Pod;  Location: [facility] OR       Family History:    Family History   Problem Relation Age of Onset     Arthritis Mother         SLE     Connective Tissue Disorder Mother         lupus     Diabetes Mother      Cerebrovascular Disease Mother      Cancer Mother      Diabetes Father      Diabetes Maternal Grandfather      Diabetes Sister        Social History:  Presents to the ED alone    Physical Exam     Patient Vitals for the past 24 hrs:   BP Pulse SpO2   01/05/22 2356 (!) 161/71 59 96 %       Physical Exam    Eyes:    Conjunctiva normal  Neck:    Supple, no meningismus.     CV:     Regular rate and rhythm.      No murmurs, rubs or gallops.       2+ DP pulses bilateral  PULM:    Clear to auscultation bilateral.       No respiratory distress.      Good air exchange.  ABD:    Soft, non-tender, non-distended.       No rebound, guarding or rigidity.  MSK:     No gross deformity to all four extremities.   LYMPH:   No cervical lymphadenopathy.  NEURO:   Alert, good muscular tone, no atrophy.   Skin:    Warm, dry     5 cm U-shaped laceration to the plantar surface of the base of the great toe on the right      Laceration extends to the MCP joint      Flexor and extensor tendons of the great toe intact  Psych:    Mood is good and affect is appropriate.      Emergency Department Course     Imaging:  Foot  XR, G/E 3 views, right   Final Result   IMPRESSION: No radiopaque foreign body. No subcutaneous emphysema. Partial amputation of the first ray at the level of the distal aspect of the proximal phalanx. Partial amputation of the fourth ray level of the PIP joint. Atheromatous calcification.          Procedures:    Narrative: Procedure: Laceration Repair        LACERATION:  A simple  clean 5 cm laceration.      LOCATION:  Right great toe      FUNCTION:  Distally circulation, motor and tendon function are intact.  Sensation limited secondary to neuropathy.      ANESTHESIA:  Digital block using 0.5% bupivacaine w/o epi total of 4 mLs      PREPARATION:  Irrigation and Scrubbing with Normal Saline      DEBRIDEMENT:  no debridement      CLOSURE:  Wound was closed with One Layer.  Skin closed with 8 x 5.0 Ethylon using interrupted sutures.      Emergency Department Course:    Reviewed:  I reviewed nursing notes, vitals and past history    Assessments:   I obtained history and examined the patient as noted above.    I rechecked the patient and explained findings.   Interventions:  Medications   Tdap (tetanus-diphtheria-acell pertussis) (ADACEL) injection 0.5 mL (0.5 mLs Intramuscular Given 22 [id number])       Disposition:  The patient was discharged to home.    Impression & Plan        Medical Decision Makin-year-old male seen in the ED with laceration to the plantar surface of the right great toe.  Tetanus was updated.  No foreign body noted on x-ray.  Laceration was repaired as described above.  Sutures out in 10 days.  General wound care instructions provided.  Patient was made aware that this is a high risk laceration given his diabetes and diabetic neuropathy.  Close follow-up with PCP.    Diagnosis:    ICD-10-CM    1. Laceration of right great toe without foreign body present or damage to nail, initial encounter  S91.111A        Discharge Medications:  Discharge Medication List as of 2022 11:50 PM               Bryan Lopez MD  22 [id number]

## 2022-01-12 DIAGNOSIS — E11.49 DIABETES MELLITUS TYPE 2 WITH NEUROLOGICAL MANIFESTATIONS (H): ICD-10-CM

## 2022-01-12 RX ORDER — METFORMIN HCL 500 MG
TABLET, EXTENDED RELEASE 24 HR ORAL
Qty: 360 TABLET | Refills: 1 | Status: SHIPPED | OUTPATIENT
Start: 2022-01-12 | End: 2022-07-07

## 2022-01-13 ENCOUNTER — THERAPY VISIT (OUTPATIENT)
Dept: PHYSICAL THERAPY | Facility: CLINIC | Age: 62
End: 2022-01-13
Payer: COMMERCIAL

## 2022-01-13 DIAGNOSIS — M54.6 BILATERAL THORACIC BACK PAIN: ICD-10-CM

## 2022-01-13 DIAGNOSIS — M54.2 CERVICAL PAIN: ICD-10-CM

## 2022-01-13 PROCEDURE — 97110 THERAPEUTIC EXERCISES: CPT | Mod: GP | Performed by: PHYSICAL THERAPIST

## 2022-01-13 PROCEDURE — 97161 PT EVAL LOW COMPLEX 20 MIN: CPT | Mod: GP | Performed by: PHYSICAL THERAPIST

## 2022-01-13 NOTE — PROGRESS NOTES
Physical Therapy Initial Evaluation  Subjective:  History of thoracic pain for years of unknown etiology. Pt injured his neck 7-21 when lifting a piece of luggage. Pt referred by MD for physical therapy on 1-3-22      Therapist Generated HPI Evaluation         Type of problem:  Cervical spine.    This is a chronic condition.  Condition occurred with:  Lifting.  Where condition occurred: at home.  Patient reports pain:  Cervical right side, cervical left side, lower thoracic and thoracic left side (R>L).  Pain is described as sharp and aching   Pain is the same all the time.  Since onset symptoms are unchanged.  Associated symptoms:  Loss of motion/stiffness and loss of strength. Symptoms are exacerbated by rotating head, looking up or down and driving  and relieved by rest.  Special tests included:  X-ray (see report).  Previous treatment includes physical therapy. There was moderate improvement following previous treatment.  Restrictions due to condition include:  Working in normal job without restrictions.  Barriers include:  None as reported by patient.    Patient Health History             Pertinent medical history includes: diabetes, high blood pressure, overweight, sleep disorder/apnea and stroke.     Medical allergies: small list of medications.       Current medications:  High blood pressure medication (diabetes).    Current occupation is many/retired.      Other job tasks: extensive moving, lifting, standing etc.                                  Objective:  Standing Alignment:    Cervical/thoracic deviations alignment: forward head and shoulder posture, thoracic kyphosis.                    Flexibility/Screens:     Upper Extremity:    Decreased left upper extremity flexibility at:  Pectoralis Major    Decreased right upper extremity flexibility present at:  Pectoralis Major    Spine:  Decreased left spine flexibility:  Upper Trap and Levator    Decreased right spine flexibility:  Upper Trap and  Levator                  Cervical/Thoracic Evaluation    AROM:  AROM Cervical:    Flexion:            Moderate loss  Extension:       Maximum loss  Rotation:         Left: moderate loss     Right: moderate loss  Side Bend:      Left: maximum loss     Right:  Maximum loss  AROM Thoracic:    Flexion:               Moderate loss  Extension:          Maximum loss  Rotation:            Left: moderate loss     Right: moderate loss    Strength: weak scapular stabilizers   Headaches: none  Cervical Myotomes:  normal                  DTR's:  normal          Cervical Dermatomes:  normal                    Cervical Palpation:  : point tenderness T3-T5 spinous processes   Tenderness present at Left:    Rhomboids; Upper Trap; Erector Spinae and Suboccipitals  Tenderness present at Right:    Rhomboids; Upper Trap; Erector Spinae and Suboccipitals                                                  General     ROS    Assessment/Plan:    Patient is a 61 year old male with cervical and thoracic complaints.    Patient has the following significant findings with corresponding treatment plan.                Diagnosis 1:  Cervical/thoracic pain  Pain -  hot/cold therapy, self management, education, home program and modalities and manual therapy as indicated  Decreased ROM/flexibility - manual therapy, therapeutic exercise and therapeutic activity  Decreased strength - therapeutic exercise, therapeutic activities and home program    Therapy Evaluation Codes:   1) History comprised of:   Personal factors that impact the plan of care:      None.    Comorbidity factors that impact the plan of care are:      Diabetes, High blood pressure, Overweight, Sleep disorder/apnea and Stroke.     Medications impacting care: High blood pressure and diabetes medication.  2) Examination of Body Systems comprised of:   Body structures and functions that impact the plan of care:      Cervical spine and Thoracic Spine.   Activity limitations that impact the  plan of care are:      Driving and looking up/down, cervical rotation.  3) Clinical presentation characteristics are:   Stable/Uncomplicated.  4) Decision-Making    Low complexity using standardized patient assessment instrument and/or measureable assessment of functional outcome.  Cumulative Therapy Evaluation is: Low complexity.    Previous and current functional limitations:  (See Goal Flow Sheet for this information)    Short term and Long term goals: (See Goal Flow Sheet for this information)     Communication ability:  Patient appears to be able to clearly communicate and understand verbal and written communication and follow directions correctly.  Treatment Explanation - The following has been discussed with the patient:   RX ordered/plan of care  Anticipated outcomes  Possible risks and side effects  This patient would benefit from PT intervention to resume normal activities.   Rehab potential is good.    Frequency:  1 X week, once daily  Duration:  for 6 weeks  Discharge Plan:  Achieve all LTG.  Independent in home treatment program.  Reach maximal therapeutic benefit.    Please refer to the daily flowsheet for treatment today, total treatment time and time spent performing 1:1 timed codes.

## 2022-01-16 ENCOUNTER — HOSPITAL ENCOUNTER (EMERGENCY)
Facility: CLINIC | Age: 62
Discharge: HOME | End: 2022-01-16
Attending: EMERGENCY MEDICINE | Admitting: EMERGENCY MEDICINE
Payer: COMMERCIAL

## 2022-01-16 VITALS
RESPIRATION RATE: 16 BRPM | TEMPERATURE: 98 F | SYSTOLIC BLOOD PRESSURE: 158 MMHG | DIASTOLIC BLOOD PRESSURE: 76 MMHG | OXYGEN SATURATION: 100 % | WEIGHT: 240 LBS | BODY MASS INDEX: 34.36 KG/M2 | HEIGHT: 70 IN | HEART RATE: 67 BPM

## 2022-01-16 DIAGNOSIS — E11.628 DIABETIC FOOT INFECTION (H): ICD-10-CM

## 2022-01-16 DIAGNOSIS — L03.031 CELLULITIS OF GREAT TOE OF RIGHT FOOT: ICD-10-CM

## 2022-01-16 DIAGNOSIS — Z48.02 VISIT FOR SUTURE REMOVAL: ICD-10-CM

## 2022-01-16 DIAGNOSIS — L08.9 DIABETIC FOOT INFECTION (H): ICD-10-CM

## 2022-01-16 PROCEDURE — 99282 EMERGENCY DEPT VISIT SF MDM: CPT

## 2022-01-16 RX ORDER — CEPHALEXIN 500 MG/1
500 CAPSULE ORAL 4 TIMES DAILY
Qty: 28 CAPSULE | Refills: 0 | Status: SHIPPED | OUTPATIENT
Start: 2022-01-16 | End: 2022-01-23

## 2022-01-16 RX ORDER — DOXYCYCLINE 100 MG/1
100 CAPSULE ORAL 2 TIMES DAILY
Qty: 14 CAPSULE | Refills: 0 | Status: SHIPPED | OUTPATIENT
Start: 2022-01-16 | End: 2022-01-23

## 2022-01-16 ASSESSMENT — MIFFLIN-ST. JEOR: SCORE: 1909.88

## 2022-01-16 NOTE — ED TRIAGE NOTES
Pt has foot redness and swelling. Pt had stitches placed two weeks ago, and is due to get them out on Wednesday. Pt has been changing dressing twice a day. Denies fevers, chills. ABCs intact. A&OX4.

## 2022-01-17 DIAGNOSIS — I10 BENIGN ESSENTIAL HYPERTENSION: ICD-10-CM

## 2022-01-17 RX ORDER — HYDROCHLOROTHIAZIDE 12.5 MG/1
12.5 CAPSULE ORAL EVERY MORNING
Qty: 90 CAPSULE | Refills: 0 | Status: SHIPPED | OUTPATIENT
Start: 2022-01-17 | End: 2022-01-20

## 2022-01-17 NOTE — DISCHARGE INSTRUCTIONS
You should continue to cover your foot and keep it well protected and I can apply your antibiotic ointment as previously doing prior to your stitches.  You should keep your appointment with podiatry on Wednesday.  You should take antibiotics as prescribed continue to elevate your foot to help with swelling several times daily.  Should the redness spread up your foot, you develop fevers or have flulike symptoms, you should return to the emergency department.

## 2022-01-17 NOTE — ED PROVIDER NOTES
History     Chief Complaint:  Foot Swelling       HPI   Crispin Rojas is a 61 year old male past medical history significant for diabetes with diabetic neuropathy presenting for right great toe redness after laceration was repaired with stitches 12 days prior.  He has been keeping close eye on his foot and noticed increased redness as throbbing yesterday that progressed through today.  He denies fevers or chills.  No nausea vomiting or diarrhea.  He denies any other trauma.  He has had to have several amputations due to his diabetes including the distal tip of his right great toe and he follows with Dr. Gil, podiatry.    ROS:  Review of Systems  10 point ROS completed, negative except as indicated in the HPI.       Allergies:  Bactrim [Sulfamethoxazole W/Trimethoprim]  Sulfa Drugs  Niacin  Simvastatin     Medications:    amLODIPine (NORVASC) 5 MG tablet  aspirin (ASA) 325 MG tablet  atorvastatin (LIPITOR) 40 MG tablet  blood glucose (NO BRAND SPECIFIED) lancets standard  blood glucose monitoring (NO BRAND SPECIFIED) meter device kit  blood glucose monitoring (NO BRAND SPECIFIED) test strip  Cholecalciferol (VITAMIN D3 PO)  Co-Enzyme Q10 100 MG CAPS  collagenase (SANTYL) 250 UNIT/GM external ointment  Continuous Blood Gluc Sensor (FREESTYLE LUCERO 14 DAY SENSOR) MISC  furosemide (LASIX) 20 MG tablet  gabapentin (NEURONTIN) 100 MG capsule  Glucagon, rDNA, (GLUCAGON EMERGENCY) 1 MG KIT  hydrochlorothiazide (MICROZIDE) 12.5 MG capsule  insulin aspart (NOVOLOG PEN) 100 UNIT/ML pen  insulin degludec (TRESIBA FLEXTOUCH) 100 UNIT/ML pen  Insulin Pen Needle (PEN NEEDLES) 31G X 5 MM MISC  ketoconazole (NIZORAL) 2 % external shampoo  lisinopril (ZESTRIL) 40 MG tablet  MAGNESIUM GLYCINATE PLUS PO  metFORMIN (GLUCOPHAGE-XR) 500 MG 24 hr tablet  Multiple Vitamins-Minerals (MULTIVITAMIN PO)  Omega-3 Fatty Acids (OMEGA-3 FISH OIL PO)  omeprazole (PRILOSEC) 40 MG DR capsule  potassium chloride ER (KLOR-CON M) 10 MEQ CR  tablet  primidone (MYSOLINE) 50 MG tablet  semaglutide (OZEMPIC, 1 MG/DOSE,) 2 MG/1.5ML pen  silver sulfADIAZINE (SILVADENE) 1 % external cream  tadalafil (CIALIS) 5 MG tablet        Past Medical History:    Past Medical History:   Diagnosis Date     Cerebral infarction (H)      Chronic infection      History of heartburn      Hyperlipidemia LDL goal <70      Hypertension      Numbness and tingling      Obesity      GILA (obstructive sleep apnea) 10/22/2018     PVD (peripheral vascular disease) (H)      Renal stone      Tremor      Type II or unspecified type diabetes mellitus without mention of complication, not stated as uncontrolled      Patient Active Problem List   Diagnosis     Morbid obesity, unspecified obesity type (H)     Calculus of kidney     Chronic left shoulder pain     Cervicalgia     Type 2 diabetes mellitus with peripheral neuropathy (H)     Hyperlipidemia LDL goal <70     Essential hypertension     Right bundle branch block     GILA (obstructive sleep apnea)     Thoracic aortic aneurysm without rupture (H)     Diabetic ulcer of lower extremity (H)     PVD (peripheral vascular disease) (H)     Scoliosis of thoracic spine, unspecified scoliosis type     Other sequelae following unspecified cerebrovascular disease     Cervical pain     Bilateral thoracic back pain        Past Surgical History:    Past Surgical History:   Procedure Laterality Date     AMPUTATE FOOT Left 8/18/2016    Procedure: AMPUTATE FOOT;  Surgeon: Jack Younger DPM;  Location: [facility]     AMPUTATE TOE(S) Right 2/1/2016    Procedure: AMPUTATE TOE(S);  Surgeon: Rachelle Manriquez DPM, Pod;  Location: [facility]     AMPUTATE TOE(S) Right 8/2/2019    Procedure: Right fourth toe partial amputation for treatment of osteomyelitis.;  Surgeon: Jack Younger DPM;  Location: [facility]     AMPUTATE TOE(S) Left 11/8/2019    Procedure: Left second toe amputation at the metatarsophalangeal joint.;  Surgeon: Rachelle Manriquez DPM, Podiatry/Foot and  "Ankle Surgery;  Location: [facility]     ANGIOGRAM       COLONOSCOPY       COMBINED CYSTOSCOPY, RETROGRADES, URETEROSCOPY, INSERT STENT Left 8/21/2016    Procedure: COMBINED CYSTOSCOPY, RETROGRADES, URETEROSCOPY, INSERT STENT;  Surgeon: Artemio Valenzuela MD;  Location: [facility]     COMBINED CYSTOSCOPY, RETROGRADES, URETEROSCOPY, LASER HOLMIUM LITHOTRIPSY URETER(S), INSERT STENT Left 10/3/2016    Procedure: COMBINED CYSTOSCOPY, RETROGRADES, URETEROSCOPY, LASER HOLMIUM LITHOTRIPSY URETER(S), INSERT STENT;  Surgeon: Artemio Valenzuela MD;  Location: [facility]     EXTRACORPOREAL SHOCK WAVE LITHOTRIPSY (ESWL) Left 9/8/2016    Procedure: EXTRACORPOREAL SHOCK WAVE LITHOTRIPSY (ESWL);  Surgeon: Artemio Valenzuela MD;  Location: [facility]     RECESSION GASTROCNEMIUS Right 2/1/2016    Procedure: RECESSION GASTROCNEMIUS;  Surgeon: Rachelle Manriquez, DPM, Pod;  Location: [facility]        Family History:    family history includes Arthritis in his mother; Cancer in his mother; Cerebrovascular Disease in his mother; Connective Tissue Disorder in his mother; Diabetes in his father, maternal grandfather, mother, and sister.    Social History:   reports that he has never smoked. He has never used smokeless tobacco. He reports current alcohol use. He reports that he does not use drugs.  PCP: Johann Serna     Physical Exam     Patient Vitals for the past 24 hrs:   BP Temp Temp src Pulse Resp SpO2 Height Weight   01/16/22 1359 (!) 181/87 98  F (36.7  C) Temporal 72 18 97 % 1.778 m (5' 10\") 108.9 kg (240 lb)        Physical Exam  Constitutional: Alert, attentive, GCS 15   Eyes: EOM are normal, anicteric, conjugate gaze  CV: distal extremities warm, well perfused  Chest: Non-labored breathing on RA  GI:  non tender. No distension. No guarding or rebound.    MSK: Symmetric 2-3+ pitting edema, distal tip of right great toe is amputated, on the plantar aspect aspect he has well-healed laceration with several small stitches remaining, there is an " area of cracked skin above this that is slightly red, the toe itself is faintly erythematous, but no redness extending more proximally.  Neurological: Alert, attentive, moving all extremities equally.   Skin: Skin is warm and dry.                Emergency Department Course     [facility]    Suture Removal    Date/Time: 2022 7:26 PM  Performed by: Nathan Cole MD  Authorized by: Nathan Cole MD     Risks, benefits and alternatives discussed.      LOCATION     Location:  Lower extremity    Lower extremity location:  Foot    Foot location:  R foot    PROCEDURE DETAILS     Wound appearance:  Good wound healing (large crack in skin above suture line)    Number of sutures removed:  4    POST-PROCEDURE DETAILS     Post-removal:  Antibiotic ointment applied and dressing applied      PROCEDURE    Patient Tolerance:  Patient tolerated the procedure well with no immediate complications       Disposition:  The patient was discharged to home.     Impression & Plan    Medical Decision Makin-year-old male past medical history significant for diabetes with diabetic foot neuropathy status post multiple resections of toes due to peripheral vascular disease and diabetes presenting for redness of his partial amputated right great toe after laceration repair 12 days ago following traumatic injury.  He does not have fever, no reported systemic signs of infection and toe has mild erythema on the skin that developed yesterday.  Exam is consistent with infected diabetic foot likely related to cracked skin above his previously repaired laceration.  I have low suspicion for bacteremia or osteomyelitis at this time.  We will initiate him on antibiotics (keflex/doxy given sulfa allergy) and he has close follow-up with podiatry in  to 3 days.  Return precautions were reviewed including increasing redness, swelling or rapidly spreading redness of the leg or fevers.       Diagnosis:    ICD-10-CM     1. Cellulitis of great toe of right foot  L03.031    2. Diabetic foot infection (H)  E11.628     L08.9    3. Visit for suture removal  Z48.02         Discharge Medications:  New Prescriptions    CEPHALEXIN (KEFLEX) 500 MG CAPSULE    Take 1 capsule (500 mg) by mouth 4 times daily for 7 days    DOXYCYCLINE HYCLATE (VIBRAMYCIN) 100 MG CAPSULE    Take 1 capsule (100 mg) by mouth 2 times daily for 7 days      Nathan Cole MD  [employer]  7:27 PM 01/16/22 1/16/2022   Nathan Cole MD Dunbar, John Forrest, MD  01/16/22 1927

## 2022-01-19 ENCOUNTER — OFFICE VISIT (OUTPATIENT)
Dept: PODIATRY | Facility: CLINIC | Age: 62
End: 2022-01-19
Payer: COMMERCIAL

## 2022-01-19 ENCOUNTER — ANCILLARY PROCEDURE (OUTPATIENT)
Dept: GENERAL RADIOLOGY | Facility: CLINIC | Age: 62
End: 2022-01-19
Attending: PODIATRIST
Payer: COMMERCIAL

## 2022-01-19 VITALS
SYSTOLIC BLOOD PRESSURE: 134 MMHG | WEIGHT: 240 LBS | HEIGHT: 70 IN | DIASTOLIC BLOOD PRESSURE: 74 MMHG | BODY MASS INDEX: 34.36 KG/M2

## 2022-01-19 DIAGNOSIS — E11.42 TYPE 2 DIABETES MELLITUS WITH PERIPHERAL NEUROPATHY (H): ICD-10-CM

## 2022-01-19 DIAGNOSIS — L97.501 SKIN ULCER OF TOE, LIMITED TO BREAKDOWN OF SKIN, UNSPECIFIED LATERALITY (H): ICD-10-CM

## 2022-01-19 DIAGNOSIS — L97.501 SKIN ULCER OF TOE, LIMITED TO BREAKDOWN OF SKIN, UNSPECIFIED LATERALITY (H): Primary | ICD-10-CM

## 2022-01-19 PROCEDURE — 97597 DBRDMT OPN WND 1ST 20 CM/<: CPT | Performed by: PODIATRIST

## 2022-01-19 PROCEDURE — 99213 OFFICE O/P EST LOW 20 MIN: CPT | Mod: 25 | Performed by: PODIATRIST

## 2022-01-19 PROCEDURE — 73660 X-RAY EXAM OF TOE(S): CPT | Mod: RT | Performed by: RADIOLOGY

## 2022-01-19 ASSESSMENT — MIFFLIN-ST. JEOR: SCORE: 1909.88

## 2022-01-19 NOTE — LETTER
1/19/2022         RE: Crispin Rojas  [id number]  [place] [address]        Dear Colleague,    Thank you for referring your patient, Crispin Rojas, to the [facility]. Please see a copy of my visit note below.    ASSESSMENT:  Encounter Diagnoses   Name Primary?     Skin ulcer of toe, limited to breakdown of skin, unspecified laterality (H) Yes     Type 2 diabetes mellitus with peripheral neuropathy (H)      I do not appreciate any concerning clinical signs of infection today, other than some mild edema of the right hallux.    MEDICAL DECISION MAKING:  I personally reviewed the x-ray images.  No changes in the residual right hallux to suggest osteomyelitis.      I reviewed simple wound cares with Judie.  He is advised to use soap water separate from bath water to cleanse the right hallux.  He is about the area dry, apply a topical antibiotic and a light dressing.    I recommend he continue with light activity.    Is to complete the oral antibiotics.    Follow-up on an as-needed basis.    Using a #15 scalpel, excisional debridement of the right hallux was performed.  This was confined to the level of deeper skin.  This involved excision of hyperkeratotic eschar.  The wound bases were scraped.  There is minimal bleeding.  The area was cleansed with an alcohol wipe.  Bacitracin and Band-Aid applied.        Disclaimer: This note consists of symbols derived from keyboarding, dictation and/or voice recognition software. As a result, there may be errors in the script that have gone undetected. Please consider this when interpreting information found in this chart.    Valentino Molina, SHRUTHI, FACFAS, [facility] Department of Podiatry/Foot & Ankle Surgery      ____________________________________________________________________    HPI:       Judie follows up for a laceration of the plantar aspect of the right hallux.  Please refer to the January 6, 2022 clinic note.  He was to return  today for suture removal.  These were placed on him in the ED.  However, on January 16, 2022 he became concerned about increasing swelling and redness.  He presented to the emergency department.  Sutures were removed.  Cephalexin and doxycycline were prescribed.  Questions if an x-ray should be done.  He reports interval improvement.  No new concerns.  *  Past Medical History:   Diagnosis Date     Cerebral infarction (H)     2010     Chronic infection      History of heartburn      Hyperlipidemia LDL goal <70      Hypertension      Numbness and tingling      Obesity      GILA (obstructive sleep apnea) 10/22/2018     PVD (peripheral vascular disease) (H)     s/p left partial toe amputation     Renal stone      Tremor      Type II or unspecified type diabetes mellitus without mention of complication, not stated as uncontrolled    *  *  Past Surgical History:   Procedure Laterality Date     AMPUTATE FOOT Left 8/18/2016    Procedure: AMPUTATE FOOT;  Surgeon: Jack Younger DPM;  [facility]     AMPUTATE TOE(S) Right 2/1/2016    Procedure: AMPUTATE TOE(S);  Surgeon: Rachelle Manriquez DPM, Pod;  [facility]: [facility]     AMPUTATE TOE(S) Right 8/2/2019    Procedure: Right fourth toe partial amputation for treatment of osteomyelitis.;  Surgeon: Jack Younger DPM;  [facility]     AMPUTATE TOE(S) Left 11/8/2019    Procedure: Left second toe amputation at the metatarsophalangeal joint.;  Surgeon: Rachelle Manriquez DPM, Podiatry/Foot and Ankle Surgery;  Location: [facility]     ANGIOGRAM       COLONOSCOPY       COMBINED CYSTOSCOPY, RETROGRADES, URETEROSCOPY, INSERT STENT Left 8/21/2016    Procedure: COMBINED CYSTOSCOPY, RETROGRADES, URETEROSCOPY, INSERT STENT;  Surgeon: Artemio Valenzuela MD;  Location:  OR     COMBINED CYSTOSCOPY, RETROGRADES, URETEROSCOPY, LASER HOLMIUM LITHOTRIPSY URETER(S), INSERT STENT Left 10/3/2016    Procedure: COMBINED CYSTOSCOPY, RETROGRADES, URETEROSCOPY, LASER HOLMIUM LITHOTRIPSY  URETER(S), INSERT STENT;  Surgeon: Artemio Valenzuela MD;  [facility]: [facility]     EXTRACORPOREAL SHOCK WAVE LITHOTRIPSY (ESWL) Left 9/8/2016    Procedure: EXTRACORPOREAL SHOCK WAVE LITHOTRIPSY (ESWL);  Surgeon: Artemio Valenzuela MD;  Location: [facility]     RECESSION GASTROCNEMIUS Right 2/1/2016    Procedure: RECESSION GASTROCNEMIUS;  Surgeon: Rachelle Manriquez, DPM, Pod;  Location: [facility]   *  *  Current Outpatient Medications   Medication Sig Dispense Refill     amLODIPine (NORVASC) 5 MG tablet Take one tablet twice daily  SEE MD THIS QUARTER 180 tablet 1     aspirin (ASA) 325 MG tablet Take 325 mg by mouth daily       atorvastatin (LIPITOR) 40 MG tablet Take one tablet daily SEE PROVIDER FOR NEXT REFILL 90 tablet 0     blood glucose (NO BRAND SPECIFIED) lancets standard Use to test blood sugar 3 times daily or as directed. 300 each 3     blood glucose monitoring (NO BRAND SPECIFIED) meter device kit Use to test blood sugar 3 times daily or as directed. 1 kit 0     blood glucose monitoring (NO BRAND SPECIFIED) test strip Use to test blood sugars 3 times daily or as directed 300 strip 3     cephALEXin (KEFLEX) 500 MG capsule Take 1 capsule (500 mg) by mouth 4 times daily for 7 days 28 capsule 0     Cholecalciferol (VITAMIN D3 PO) Take 1,000 Units by mouth daily        Co-Enzyme Q10 100 MG CAPS Take 100 mg by mouth daily        collagenase (SANTYL) 250 UNIT/GM external ointment Apply topically daily 30 g 1     Continuous Blood Gluc Sensor (FREESTYLE LUECRO 14 DAY SENSOR) [facility] [place] USE ONE EVERY 14 DAYS 2 each 3     doxycycline hyclate (VIBRAMYCIN) 100 MG capsule Take 1 capsule (100 mg) by mouth 2 times daily for 7 days 14 capsule 0     furosemide (LASIX) 20 MG tablet Take 1 tablet (20 mg) by mouth daily as needed (for leg swelling) As needed for edema 30 tablet 1     gabapentin (NEURONTIN) 100 MG capsule Take 300 mg by mouth 3 times daily       Glucagon, rDNA, (GLUCAGON EMERGENCY) 1 MG KIT Inject 1 mg into the muscle once  "for 1 dose 1 kit 0     hydrochlorothiazide (MICROZIDE) 12.5 MG capsule Take 1 capsule (12.5 mg) by mouth every morning 90 capsule 0     insulin aspart (NOVOLOG PEN) 100 UNIT/ML pen Use daily with meals, up to 70 units a day 30 mL 11     insulin degludec (TRESIBA FLEXTOUCH) 100 UNIT/ML pen 45-55 units subcutaneous daily 60 mL 11     Insulin Pen Needle (PEN NEEDLES) 31G X 5 MM MISC 1 Device 4 times daily 200 each 10     ketoconazole (NIZORAL) 2 % external shampoo APPLY TOPICALLY DAILY AS NEEDED FOR ITCHING OR IRRITATION. SEE MD AFTER 6 WEEKS. 120 mL 1     lisinopril (ZESTRIL) 40 MG tablet TAKE 1 TABLET(40 MG) BY MOUTH DAILY 90 tablet 0     MAGNESIUM GLYCINATE PLUS PO Take 400 mg by mouth 2 times daily       metFORMIN (GLUCOPHAGE-XR) 500 MG 24 hr tablet TAKE 2 TABLETS BY MOUTH TWICE DAILY WITH MEALS 360 tablet 1     Multiple Vitamins-Minerals (MULTIVITAMIN PO) Take 1 tablet by mouth daily        Omega-3 Fatty Acids (OMEGA-3 FISH OIL PO) Take 1 g by mouth 2 times daily (with meals)        omeprazole (PRILOSEC) 40 MG DR capsule        potassium chloride ER (KLOR-CON M) 10 MEQ CR tablet Take 1 tablet (10 mEq) by mouth 2 times daily 5 tablet 3     primidone (MYSOLINE) 50 MG tablet TAKE 1 TABLET BY MOUTH THREE TIMES DAILY       semaglutide (OZEMPIC, 1 MG/DOSE,) 2 MG/1.5ML pen Inject 1 mg Subcutaneous every 7 days , on Wednesdays 6 mL 5     silver sulfADIAZINE (SILVADENE) 1 % external cream Apply topically daily 50 g 1     tadalafil (CIALIS) 5 MG tablet TAKE 1 TABLET BY MOUTH EVERY DAY AS NEEDED one hour before intercourse 30 tablet 1         EXAM:    Vitals: /74   Ht 1.778 m (5' 10\")   Wt 108.9 kg (240 lb)   BMI 34.44 kg/m    BMI: Body mass index is 34.44 kg/m .    Constitutional:  Crispin Rojas is in no apparent distress, appears well-nourished.  Cooperative with history and physical exam.    Vascular:  Pedal pulses are palpable for both the DP and PT arteries.  CFT < 3 sec.      Derm: There is some " hyperkeratotic dry skin on the plantar surface of the residual right hallux.  In this region there are 2 fissures.  The more distal fissure measures 1.5 cm in width by 0.2 cm in length.  The more proximal fissure is 0.8 cm in width by 0.2 cm in length.  The depth of both wounds is to deeper skin.    Musculoskeletal:   Partial amputation of the right hallux.  Digital contractures.The bilateral second toes are rigid.              Again, thank you for allowing me to participate in the care of your patient.        Sincerely,        Valentino Molina DPM

## 2022-01-19 NOTE — PROGRESS NOTES
ASSESSMENT:  Encounter Diagnoses   Name Primary?     Skin ulcer of toe, limited to breakdown of skin, unspecified laterality (H) Yes     Type 2 diabetes mellitus with peripheral neuropathy (H)      I do not appreciate any concerning clinical signs of infection today, other than some mild edema of the right hallux.    MEDICAL DECISION MAKING:  I personally reviewed the x-ray images.  No changes in the residual right hallux to suggest osteomyelitis.      I reviewed simple wound cares with Judie.  He is advised to use soap water separate from bath water to cleanse the right hallux.  He is about the area dry, apply a topical antibiotic and a light dressing.    I recommend he continue with light activity.    Is to complete the oral antibiotics.    Follow-up on an as-needed basis.    Using a #15 scalpel, excisional debridement of the right hallux was performed.  This was confined to the level of deeper skin.  This involved excision of hyperkeratotic eschar.  The wound bases were scraped.  There is minimal bleeding.  The area was cleansed with an alcohol wipe.  Bacitracin and Band-Aid applied.        Disclaimer: This note consists of symbols derived from keyboarding, dictation and/or voice recognition software. As a result, there may be errors in the script that have gone undetected. Please consider this when interpreting information found in this chart.    Valentino Molina DPM, FACFAS, MS    [facility]      ____________________________________________________________________    HPI:       Judie follows up for a laceration of the plantar aspect of the right hallux.  Please refer to the January 6, 2022 clinic note.  He was to return today for suture removal.  These were placed on him in the ED.  However, on January 16, 2022 he became concerned about increasing swelling and redness.  He presented to the emergency department.  Sutures were removed.  Cephalexin and doxycycline were  prescribed.  Questions if an x-ray should be done.  He reports interval improvement.  No new concerns.  *  Past Medical History:   Diagnosis Date     Cerebral infarction (H)     2010     Chronic infection      History of heartburn      Hyperlipidemia LDL goal <70      Hypertension      Numbness and tingling      Obesity      GILA (obstructive sleep apnea) 10/22/2018     PVD (peripheral vascular disease) (H)     s/p left partial toe amputation     Renal stone      Tremor      Type II or unspecified type diabetes mellitus without mention of complication, not stated as uncontrolled    *  *  Past Surgical History:   Procedure Laterality Date     AMPUTATE FOOT Left 8/18/2016    Procedure: AMPUTATE FOOT;  Surgeon: Jack Younger DPM;  Location: [facility] OR     AMPUTATE TOE(S) Right 2/1/2016    Procedure: AMPUTATE TOE(S);  Surgeon: Rachelle Manriquez DPM, Pod;  Location: [facility]     AMPUTATE TOE(S) Right 8/2/2019    Procedure: Right fourth toe partial amputation for treatment of osteomyelitis.;  Surgeon: Jack Younger DPM;  Location: [facility]     AMPUTATE TOE(S) Left 11/8/2019    Procedure: Left second toe amputation at the metatarsophalangeal joint.;  Surgeon: Rachelle Manriquez DPM, Podiatry/Foot and Ankle Surgery;  Location:  OR     ANGIOGRAM       COLONOSCOPY       COMBINED CYSTOSCOPY, RETROGRADES, URETEROSCOPY, INSERT STENT Left 8/21/2016    Procedure: COMBINED CYSTOSCOPY, RETROGRADES, URETEROSCOPY, INSERT STENT;  Surgeon: Artemio Valenzuela MD;  Location:  OR     COMBINED CYSTOSCOPY, RETROGRADES, URETEROSCOPY, LASER HOLMIUM LITHOTRIPSY URETER(S), INSERT STENT Left 10/3/2016    Procedure: COMBINED CYSTOSCOPY, RETROGRADES, URETEROSCOPY, LASER HOLMIUM LITHOTRIPSY URETER(S), INSERT STENT;  Surgeon: Artemio Valenzuela MD;  Location:  OR     EXTRACORPOREAL SHOCK WAVE LITHOTRIPSY (ESWL) Left 9/8/2016    Procedure: EXTRACORPOREAL SHOCK WAVE LITHOTRIPSY (ESWL);  Surgeon: Artemio Valenzuela MD;  Location:  OR      RECESSION GASTROCNEMIUS Right 2/1/2016    Procedure: RECESSION GASTROCNEMIUS;  Surgeon: Rachelle Manriquez, DPM, Pod;  Location: [facility] OR   *  *  Current Outpatient Medications   Medication Sig Dispense Refill     amLODIPine (NORVASC) 5 MG tablet Take one tablet twice daily  SEE MD THIS QUARTER 180 tablet 1     aspirin (ASA) 325 MG tablet Take 325 mg by mouth daily       atorvastatin (LIPITOR) 40 MG tablet Take one tablet daily SEE PROVIDER FOR NEXT REFILL 90 tablet 0     blood glucose (NO BRAND SPECIFIED) lancets standard Use to test blood sugar 3 times daily or as directed. 300 each 3     blood glucose monitoring (NO BRAND SPECIFIED) meter device kit Use to test blood sugar 3 times daily or as directed. 1 kit 0     blood glucose monitoring (NO BRAND SPECIFIED) test strip Use to test blood sugars 3 times daily or as directed 300 strip 3     cephALEXin (KEFLEX) 500 MG capsule Take 1 capsule (500 mg) by mouth 4 times daily for 7 days 28 capsule 0     Cholecalciferol (VITAMIN D3 PO) Take 1,000 Units by mouth daily        Co-Enzyme Q10 100 MG CAPS Take 100 mg by mouth daily        collagenase (SANTYL) 250 UNIT/GM external ointment Apply topically daily 30 g 1     Continuous Blood Gluc Sensor (FREESTYLE LUCERO 14 DAY SENSOR) [facility] USE ONE EVERY 14 DAYS 2 each 3     doxycycline hyclate (VIBRAMYCIN) 100 MG capsule Take 1 capsule (100 mg) by mouth 2 times daily for 7 days 14 capsule 0     furosemide (LASIX) 20 MG tablet Take 1 tablet (20 mg) by mouth daily as needed (for leg swelling) As needed for edema 30 tablet 1     gabapentin (NEURONTIN) 100 MG capsule Take 300 mg by mouth 3 times daily       Glucagon, rDNA, (GLUCAGON EMERGENCY) 1 MG KIT Inject 1 mg into the muscle once for 1 dose 1 kit 0     hydrochlorothiazide (MICROZIDE) 12.5 MG capsule Take 1 capsule (12.5 mg) by mouth every morning 90 capsule 0     insulin aspart (NOVOLOG PEN) 100 UNIT/ML pen Use daily with meals, up to 70 units a day 30 mL 11     insulin degludec  "(TRESIBA FLEXTOUCH) 100 UNIT/ML pen 45-55 units subcutaneous daily 60 mL 11     Insulin Pen Needle (PEN NEEDLES) 31G X 5 MM MISC 1 Device 4 times daily 200 each 10     ketoconazole (NIZORAL) 2 % external shampoo APPLY TOPICALLY DAILY AS NEEDED FOR ITCHING OR IRRITATION. SEE MD AFTER 6 WEEKS. 120 mL 1     lisinopril (ZESTRIL) 40 MG tablet TAKE 1 TABLET(40 MG) BY MOUTH DAILY 90 tablet 0     MAGNESIUM GLYCINATE PLUS PO Take 400 mg by mouth 2 times daily       metFORMIN (GLUCOPHAGE-XR) 500 MG 24 hr tablet TAKE 2 TABLETS BY MOUTH TWICE DAILY WITH MEALS 360 tablet 1     Multiple Vitamins-Minerals (MULTIVITAMIN PO) Take 1 tablet by mouth daily        Omega-3 Fatty Acids (OMEGA-3 FISH OIL PO) Take 1 g by mouth 2 times daily (with meals)        omeprazole (PRILOSEC) 40 MG DR capsule        potassium chloride ER (KLOR-CON M) 10 MEQ CR tablet Take 1 tablet (10 mEq) by mouth 2 times daily 5 tablet 3     primidone (MYSOLINE) 50 MG tablet TAKE 1 TABLET BY MOUTH THREE TIMES DAILY       semaglutide (OZEMPIC, 1 MG/DOSE,) 2 MG/1.5ML pen Inject 1 mg Subcutaneous every 7 days , on Wednesdays 6 mL 5     silver sulfADIAZINE (SILVADENE) 1 % external cream Apply topically daily 50 g 1     tadalafil (CIALIS) 5 MG tablet TAKE 1 TABLET BY MOUTH EVERY DAY AS NEEDED one hour before intercourse 30 tablet 1         EXAM:    Vitals: /74   Ht 1.778 m (5' 10\")   Wt 108.9 kg (240 lb)   BMI 34.44 kg/m    BMI: Body mass index is 34.44 kg/m .    Constitutional:  Crispin Rojas is in no apparent distress, appears well-nourished.  Cooperative with history and physical exam.    Vascular:  Pedal pulses are palpable for both the DP and PT arteries.  CFT < 3 sec.      Derm: There is some hyperkeratotic dry skin on the plantar surface of the residual right hallux.  In this region there are 2 fissures.  The more distal fissure measures 1.5 cm in width by 0.2 cm in length.  The more proximal fissure is 0.8 cm in width by 0.2 cm in length.  The depth " of both wounds is to deeper skin.    Musculoskeletal:   Partial amputation of the right hallux.  Digital contractures.The bilateral second toes are rigid.

## 2022-01-19 NOTE — PATIENT INSTRUCTIONS
Thank you for choosing [facility] / [facility]le Surgery!    DR. CANDELARIA'S CLINIC LOCATIONS     [facility] SCHEDULE SURGERY: [phone]   [address] [place] [address] APPOINTMENTS: [phone]   [place] [address] BILLING QUESTIONS: [phone]  [phone] RADIOLOGY: [phone]       [facility]    [phone]    [place], [place] [address] [phone]  [phone]          Wound Care Recommendations:    1)  Keep the wound covered by a bandage when bathing.    2)  Gently clean the wound with soap water, separate from bath/shower water.      3)  Each day, apply a topical antibiotic ointment to the wound (Neosporin, Triple antibiotic, Bacitracin).   Cover with large band-aid or gauze.      5)  Please seek immediate medical attention if any increasing redness, drainage, smell, or pain related to the wound.     6)  Please return to clinic in the period of time requested by Dr. Candelaria.        SIGNS OF INFECTION    expanding redness around the wound     yellow or greenish-colored pus or cloudy wound drainage     red streaking spreading from the wound     increased swelling, tenderness, or pain around the wound     fever  *If you notice any of these signs of infection, call us right away!

## 2022-01-20 ENCOUNTER — THERAPY VISIT (OUTPATIENT)
Dept: PHYSICAL THERAPY | Facility: CLINIC | Age: 62
End: 2022-01-20
Payer: COMMERCIAL

## 2022-01-20 ENCOUNTER — OFFICE VISIT (OUTPATIENT)
Dept: CARDIOLOGY | Facility: CLINIC | Age: 62
End: 2022-01-20
Payer: COMMERCIAL

## 2022-01-20 VITALS
WEIGHT: 247 LBS | HEIGHT: 70 IN | HEART RATE: 63 BPM | OXYGEN SATURATION: 97 % | BODY MASS INDEX: 35.36 KG/M2 | DIASTOLIC BLOOD PRESSURE: 84 MMHG | SYSTOLIC BLOOD PRESSURE: 137 MMHG

## 2022-01-20 DIAGNOSIS — M54.6 BILATERAL THORACIC BACK PAIN: ICD-10-CM

## 2022-01-20 DIAGNOSIS — I73.9 PVD (PERIPHERAL VASCULAR DISEASE) (H): ICD-10-CM

## 2022-01-20 DIAGNOSIS — I10 BENIGN ESSENTIAL HYPERTENSION: Primary | ICD-10-CM

## 2022-01-20 DIAGNOSIS — I77.810 MILD ASCENDING AORTA DILATATION (H): ICD-10-CM

## 2022-01-20 DIAGNOSIS — E11.42 TYPE 2 DIABETES MELLITUS WITH PERIPHERAL NEUROPATHY (H): ICD-10-CM

## 2022-01-20 DIAGNOSIS — E78.5 HYPERLIPIDEMIA LDL GOAL <70: ICD-10-CM

## 2022-01-20 DIAGNOSIS — Z86.73 HISTORY OF STROKE: ICD-10-CM

## 2022-01-20 DIAGNOSIS — M54.2 CERVICAL PAIN: ICD-10-CM

## 2022-01-20 PROBLEM — I71.20 THORACIC AORTIC ANEURYSM WITHOUT RUPTURE (H): Status: RESOLVED | Noted: 2019-04-29 | Resolved: 2022-01-20

## 2022-01-20 PROCEDURE — 97035 APP MDLTY 1+ULTRASOUND EA 15: CPT | Mod: GP | Performed by: PHYSICAL THERAPIST

## 2022-01-20 PROCEDURE — 99214 OFFICE O/P EST MOD 30 MIN: CPT | Performed by: INTERNAL MEDICINE

## 2022-01-20 PROCEDURE — 97110 THERAPEUTIC EXERCISES: CPT | Mod: GP | Performed by: PHYSICAL THERAPIST

## 2022-01-20 PROCEDURE — 97140 MANUAL THERAPY 1/> REGIONS: CPT | Mod: GP | Performed by: PHYSICAL THERAPIST

## 2022-01-20 RX ORDER — FUROSEMIDE 20 MG
20 TABLET ORAL DAILY PRN
Qty: 30 TABLET | Refills: 3 | COMMUNITY
Start: 2022-01-20 | End: 2023-01-11

## 2022-01-20 RX ORDER — ATORVASTATIN CALCIUM 40 MG/1
40 TABLET, FILM COATED ORAL DAILY
Qty: 100 TABLET | Refills: 5 | COMMUNITY
Start: 2022-01-20 | End: 2022-05-25

## 2022-01-20 RX ORDER — LISINOPRIL 40 MG/1
40 TABLET ORAL DAILY
Qty: 100 TABLET | Refills: 5 | COMMUNITY
Start: 2022-01-20 | End: 2023-01-11

## 2022-01-20 RX ORDER — HYDROCHLOROTHIAZIDE 12.5 MG/1
12.5 CAPSULE ORAL EVERY MORNING
Qty: 100 CAPSULE | Refills: 5 | COMMUNITY
Start: 2022-01-20 | End: 2022-05-10

## 2022-01-20 RX ORDER — AMLODIPINE BESYLATE 5 MG/1
5 TABLET ORAL 2 TIMES DAILY
Qty: 180 TABLET | Refills: 5 | COMMUNITY
Start: 2022-01-20 | End: 2022-07-06

## 2022-01-20 ASSESSMENT — MIFFLIN-ST. JEOR: SCORE: 1941.63

## 2022-01-20 NOTE — PROGRESS NOTES
Service Date: 01/20/2022    PRIMARY CARE PROVIDER:  Johann Serna DO    REASON FOR VISIT:  Scheduled followup of hypertension, hyperlipidemia in the context of multiple cardiovascular risk factors.    HISTORY OF PRESENT ILLNESS:    It was my pleasure to follow up with Judie.  He is a delightful 61-year-old  male, owns a , lives with his wife.  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    He has multiple cardiovascular diagnoses:  1.  Benign essential hypertension.  2.  Cerebellar stroke in 2010, treated medically with no residual sequelae.  3.  Type 2 diabetes mellitus of 22 years, on insulin, excellently controlled with an A1c of 6.1%, complicated by peripheral vascular disease and peripheral neuropathy, status post left partial toe amputation.  4.  Hyperlipidemia with goal LDL less than 70.  On atorvastatin 40 mg.  5.  Obstructive sleep apnea, CPAP intolerant.  6.  BMI 35.  7.  Chronic right bundle branch block.  8.  Family history of coronary artery disease in multiple family members.  9.  Stable mild aortic root and ascending aorta dilatation of 4.3 cm.    Pat exercises extensively.  He walks 4 miles most days, does Pilates, lifts weights, flexibility exercises and has no symptoms of angina, heart failure or arrhythmia.  He gets occasional lower extremity edema and takes furosemide 20 mg p.r.n.  BP is excellent at 137/84.  Pulse is 63 per minute and in sinus.  LDL at goal at 45, normal triglycerides of 42.  Creatinine is 0.87, HbA1c is 6.1%.  He does not use tobacco products.  Normal hemoglobin.    I reviewed his echocardiogram images with the patient.  He has a normal LV size and systolic function.  LVEF 60%-65%.  Normal diastolic function, normal left ventricular systolic function, no significant valve disease, stable dilatation of aortic root of 4.3 cm and ascending aorta 4.3 cm.  Aortic valve is  trileaflet.    PHYSICAL EXAMINATION:    GENERAL:  Appears well.  CARDIOVASCULAR:  Regular heart sounds.  No murmur, no carotid bruits.  LUNGS:  Clear to auscultation.    ASSESSMENT:    Cardiovascular status stable.  Blood pressure optimally treated.  LDL at goal.  Physically active.  Does not use tobacco products.    PLAN:  1.  No changes to medications.  2.  Cardiac prescriptions renewed for a year.  3.  Talked again about using CPAP for obstructive sleep apnea.  He is really intolerant of it and does not want to try again.  4.  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  We can get surveillance imaging every 2 or 3 years.  5.  Follow up in clinic in 1 year with fasting labs.    cc:  Johann Serna DO  [facility]  [address] [place] [address]    [facility] Emeli Theodore MD        D: 2022   T: 2022   MT: al    Name:     CEDRIC LAW  MRN:      [id number]        Account:      [id number]   :      1960           Service Date: 2022       Document: [id number]

## 2022-01-20 NOTE — LETTER
1/20/2022    Johann GileDO  [address]  [facility] [address]    RE: Crispin Rojas       Dear Colleague,     I had the pleasure of seeing Crispin Rojas in the [facility].  Clinic visit note dictated. Dictation reference number - [id number]        Today's clinic visit entailed:  The following tests were independently interpreted by me as noted in my documentation: ECG, labs, echocardiogram images.  Ordering of each unique test  Prescription drug management  30 minutes spent on the date of the encounter doing chart review, history and exam, documentation and further activities per the note  Provider  Link to MDM Help Grid     The level of medical decision making during this visit was of moderate complexity.        CURRENT MEDICATIONS:  Current Outpatient Medications   Medication Sig Dispense Refill     amLODIPine (NORVASC) 5 MG tablet Take 1 tablet (5 mg) by mouth 2 times daily Take one tablet twice daily  SEE MD THIS QUARTER 180 tablet 5     aspirin (ASA) 325 MG tablet Take 325 mg by mouth daily       atorvastatin (LIPITOR) 40 MG tablet Take 1 tablet (40 mg) by mouth daily Take one tablet daily SEE PROVIDER FOR NEXT REFILL 100 tablet 5     blood glucose (NO BRAND SPECIFIED) lancets standard Use to test blood sugar 3 times daily or as directed. 300 each 3     blood glucose monitoring (NO BRAND SPECIFIED) meter device kit Use to test blood sugar 3 times daily or as directed. 1 kit 0     blood glucose monitoring (NO BRAND SPECIFIED) test strip Use to test blood sugars 3 times daily or as directed 300 strip 3     cephALEXin (KEFLEX) 500 MG capsule Take 1 capsule (500 mg) by mouth 4 times daily for 7 days 28 capsule 0     Cholecalciferol (VITAMIN D3 PO) Take 1,000 Units by mouth daily        Co-Enzyme Q10 100 MG CAPS Take 100 mg by mouth daily        collagenase (SANTYL) 250 UNIT/GM external ointment Apply topically daily 30 g 1     Continuous Blood Gluc Sensor (FREESTYLE LUCERO 14 DAY SENSOR)  MISC USE ONE EVERY 14 DAYS 2 each 3     doxycycline hyclate (VIBRAMYCIN) 100 MG capsule Take 1 capsule (100 mg) by mouth 2 times daily for 7 days 14 capsule 0     furosemide (LASIX) 20 MG tablet Take 1 tablet (20 mg) by mouth daily as needed (for leg swelling) As needed for edema 30 tablet 3     gabapentin (NEURONTIN) 100 MG capsule Take 300 mg by mouth 3 times daily       hydrochlorothiazide (MICROZIDE) 12.5 MG capsule Take 1 capsule (12.5 mg) by mouth every morning 100 capsule 5     insulin aspart (NOVOLOG PEN) 100 UNIT/ML pen Use daily with meals, up to 70 units a day 30 mL 11     insulin degludec (TRESIBA FLEXTOUCH) 100 UNIT/ML pen 45-55 units subcutaneous daily 60 mL 11     Insulin Pen Needle (PEN NEEDLES) 31G X 5 MM MISC 1 Device 4 times daily 200 each 10     ketoconazole (NIZORAL) 2 % external shampoo APPLY TOPICALLY DAILY AS NEEDED FOR ITCHING OR IRRITATION. SEE MD AFTER 6 WEEKS. 120 mL 1     lisinopril (ZESTRIL) 40 MG tablet Take 1 tablet (40 mg) by mouth daily 100 tablet 5     MAGNESIUM GLYCINATE PLUS PO Take 400 mg by mouth 2 times daily       metFORMIN (GLUCOPHAGE-XR) 500 MG 24 hr tablet TAKE 2 TABLETS BY MOUTH TWICE DAILY WITH MEALS 360 tablet 1     Multiple Vitamins-Minerals (MULTIVITAMIN PO) Take 1 tablet by mouth daily        Omega-3 Fatty Acids (OMEGA-3 FISH OIL PO) Take 1 g by mouth 2 times daily (with meals)        omeprazole (PRILOSEC) 40 MG DR capsule        potassium chloride ER (KLOR-CON M) 10 MEQ CR tablet Take 1 tablet (10 mEq) by mouth 2 times daily 5 tablet 3     primidone (MYSOLINE) 50 MG tablet TAKE 1 TABLET BY MOUTH THREE TIMES DAILY       semaglutide (OZEMPIC, 1 MG/DOSE,) 2 MG/1.5ML pen Inject 1 mg Subcutaneous every 7 days , on Wednesdays 6 mL 5     silver sulfADIAZINE (SILVADENE) 1 % external cream Apply topically daily 50 g 1     tadalafil (CIALIS) 5 MG tablet TAKE 1 TABLET BY MOUTH EVERY DAY AS NEEDED one hour before intercourse 30 tablet 1     Glucagon, rDNA, (GLUCAGON EMERGENCY)  1 MG KIT Inject 1 mg into the muscle once for 1 dose 1 kit 0         ALLERGIES:  Allergies   Allergen Reactions     Bactrim [Sulfamethoxazole W/Trimethoprim] Nausea and Vomiting     Sulfa Drugs Nausea     Niacin Swelling     SIMCOR caused edema in extremities     Simvastatin Swelling     SIMCOR caused edema in extremities       PAST MEDICAL HISTORY:    Past Medical History:   Diagnosis Date     Cerebral infarction (H)     2010     Chronic infection      Hyperlipidemia LDL goal <70      Hypertension      Numbness and tingling      Obesity      GILA (obstructive sleep apnea) 10/22/2018    CPAP intolerant     PVD (peripheral vascular disease) (H)     s/p left partial toe amputation     Renal stone      Tremor      Type 2 diabetes mellitus with diabetic polyneuropathy, with long-term current use of insulin (H)        PAST SURGICAL HISTORY:    Past Surgical History:   Procedure Laterality Date     AMPUTATE FOOT Left 8/18/2016    Procedure: AMPUTATE FOOT;  Surgeon: Jack Younger DPM;  Location: [facility] OR     AMPUTATE TOE(S) Right 2/1/2016    Procedure: AMPUTATE TOE(S);  Surgeon: Rachelle Manriquez DPM, Pod;  Location: [facility] OR     AMPUTATE TOE(S) Right 8/2/2019    Procedure: Right fourth toe partial amputation for treatment of osteomyelitis.;  Surgeon: Jack Younger DPM;  Location: [facility] OR     AMPUTATE TOE(S) Left 11/8/2019    Procedure: Left second toe amputation at the metatarsophalangeal joint.;  Surgeon: Rachelle Manriquez DPM, Podiatry/Foot and Ankle Surgery;  Location: [facility] OR     ANGIOGRAM       COLONOSCOPY       COMBINED CYSTOSCOPY, RETROGRADES, URETEROSCOPY, INSERT STENT Left 8/21/2016    Procedure: COMBINED CYSTOSCOPY, RETROGRADES, URETEROSCOPY, INSERT STENT;  Surgeon: Artemio Valenzuela MD;  Location: [facility] OR     COMBINED CYSTOSCOPY, RETROGRADES, URETEROSCOPY, LASER HOLMIUM LITHOTRIPSY URETER(S), INSERT STENT Left 10/3/2016    Procedure: COMBINED CYSTOSCOPY, RETROGRADES, URETEROSCOPY, LASER HOLMIUM LITHOTRIPSY  URETER(S), INSERT STENT;  Surgeon: Artemio Valenzuela MD;  Location: [facility] OR     EXTRACORPOREAL SHOCK WAVE LITHOTRIPSY (ESWL) Left 9/8/2016    Procedure: EXTRACORPOREAL SHOCK WAVE LITHOTRIPSY (ESWL);  Surgeon: Artemio Valenzuela MD;  Location: [facility] OR     RECESSION GASTROCNEMIUS Right 2/1/2016    Procedure: RECESSION GASTROCNEMIUS;  Surgeon: Rachelle Manriquez, DPM, Pod;  Location: [facility] OR       FAMILY HISTORY:    Family History   Problem Relation Age of Onset     Arthritis Mother         SLE     Connective Tissue Disorder Mother         lupus     Diabetes Mother      Cerebrovascular Disease Mother      Cancer Mother      Diabetes Father      Diabetes Maternal Grandfather      Diabetes Sister        SOCIAL HISTORY:    Social History     Socioeconomic History     Marital status:      Spouse name: Sydney     Number of children: 2     Years of education: None     Highest education level: Master's degree (e.g., MA, MS, Arleth, MEd, MSW, ELIANA)   Occupational History     Occupation: marketing     Employer: [employer]     Comment: [employer]   Tobacco Use     Smoking status: Never Smoker     Smokeless tobacco: Never Used   Vaping Use     Vaping Use: Never used   Substance and Sexual Activity     Alcohol use: Yes     Alcohol/week: 0.0 standard drinks     Comment: rare---red wine 3x per month     Drug use: No     Sexual activity: Not Currently     Partners: Female   Other Topics Concern     Parent/sibling w/ CABG, MI or angioplasty before 65F 55M? No   Social History Narrative     None     Social Determinants of Health     Financial Resource Strain: Low Risk      Difficulty of Paying Living Expenses: Not very hard   Food Insecurity: No Food Insecurity     Worried About Running Out of Food in the Last Year: Never true     Ran Out of Food in the Last Year: Never true   Transportation Needs: No Transportation Needs     Lack of Transportation (Medical): No     Lack of Transportation (Non-Medical): No   Physical Activity:  "Sufficiently Active     Days of Exercise per Week: 4 days     Minutes of Exercise per Session: 40 min   Stress: No Stress Concern Present     Feeling of Stress : Not at all   Social Connections: Moderately Isolated     Frequency of Communication with Friends and Family: Once a week     Frequency of Social Gatherings with Friends and Family: Never     Attends Orthodoxy Services: More than 4 times per year     Active Member of Clubs or Organizations: No     Attends Club or Organization Meetings: Not on file     Marital Status:    Intimate Partner Violence: Not on file   Housing Stability: Low Risk      Unable to Pay for Housing in the Last Year: No     Number of Places Lived in the Last Year: 2     Unstable Housing in the Last Year: No       PHYSICAL EXAM:    Vitals: /84   Pulse 63   Ht 1.778 m (5' 10\")   Wt 112 kg (247 lb)   SpO2 97%   BMI 35.44 kg/m    Wt Readings from Last 5 Encounters:   01/20/22 112 kg (247 lb)   01/19/22 108.9 kg (240 lb)   01/16/22 108.9 kg (240 lb)   01/06/22 110.7 kg (244 lb)   01/05/22 110.7 kg (244 lb)               Encounter Diagnoses   Name Primary?     Benign essential hypertension Yes     Hyperlipidemia LDL goal <70      Type 2 diabetes mellitus with peripheral neuropathy (H)      BMI 35.0-35.9,adult      Mild ascending aorta dilatation (H)      History of stroke      PVD (peripheral vascular disease) (H)        Orders Placed This Encounter   Procedures     Lipid Profile     Basic metabolic panel     Follow-Up with Cardiology                 Thank you for allowing me to participate in the care of your patient.      Sincerely,     Singh Theodore MD     [facility]  cc:   No referring provider defined for this encounter.        "

## 2022-01-20 NOTE — LETTER
1/20/2022       RE: Crispin Rojas  [id number]  [facility] [address]     Dear Colleague,    Thank you for referring your patient, Crispin Rojas, to the [facility]. Please see a copy of my visit note below.    Clinic visit note dictated. Dictation reference number - [id number]        Today's clinic visit entailed:  The following tests were independently interpreted by me as noted in my documentation: ECG, labs, echocardiogram images.  Ordering of each unique test  Prescription drug management  30 minutes spent on the date of the encounter doing chart review, history and exam, documentation and further activities per the note  Provider  Link to MDM Help Grid     The level of medical decision making during this visit was of moderate complexity.        CURRENT MEDICATIONS:  Current Outpatient Medications   Medication Sig Dispense Refill     amLODIPine (NORVASC) 5 MG tablet Take 1 tablet (5 mg) by mouth 2 times daily Take one tablet twice daily  SEE MD THIS QUARTER 180 tablet 5     aspirin (ASA) 325 MG tablet Take 325 mg by mouth daily       atorvastatin (LIPITOR) 40 MG tablet Take 1 tablet (40 mg) by mouth daily Take one tablet daily SEE PROVIDER FOR NEXT REFILL 100 tablet 5     blood glucose (NO BRAND SPECIFIED) lancets standard Use to test blood sugar 3 times daily or as directed. 300 each 3     blood glucose monitoring (NO BRAND SPECIFIED) meter device kit Use to test blood sugar 3 times daily or as directed. 1 kit 0     blood glucose monitoring (NO BRAND SPECIFIED) test strip Use to test blood sugars 3 times daily or as directed 300 strip 3     cephALEXin (KEFLEX) 500 MG capsule Take 1 capsule (500 mg) by mouth 4 times daily for 7 days 28 capsule 0     Cholecalciferol (VITAMIN D3 PO) Take 1,000 Units by mouth daily        Co-Enzyme Q10 100 MG CAPS Take 100 mg by mouth daily        collagenase (SANTYL) 250 UNIT/GM external  ointment Apply topically daily 30 g 1     Continuous Blood Gluc Sensor (FREESTYLE LUCERO 14 DAY SENSOR) [facility] USE ONE EVERY 14 DAYS 2 each 3     doxycycline hyclate (VIBRAMYCIN) 100 MG capsule Take 1 capsule (100 mg) by mouth 2 times daily for 7 days 14 capsule 0     furosemide (LASIX) 20 MG tablet Take 1 tablet (20 mg) by mouth daily as needed (for leg swelling) As needed for edema 30 tablet 3     gabapentin (NEURONTIN) 100 MG capsule Take 300 mg by mouth 3 times daily       hydrochlorothiazide (MICROZIDE) 12.5 MG capsule Take 1 capsule (12.5 mg) by mouth every morning 100 capsule 5     insulin aspart (NOVOLOG PEN) 100 UNIT/ML pen Use daily with meals, up to 70 units a day 30 mL 11     insulin degludec (TRESIBA FLEXTOUCH) 100 UNIT/ML pen 45-55 units subcutaneous daily 60 mL 11     Insulin Pen Needle (PEN NEEDLES) 31G X 5 MM MISC 1 Device 4 times daily 200 each 10     ketoconazole (NIZORAL) 2 % external shampoo APPLY TOPICALLY DAILY AS NEEDED FOR ITCHING OR IRRITATION. SEE MD AFTER 6 WEEKS. 120 mL 1     lisinopril (ZESTRIL) 40 MG tablet Take 1 tablet (40 mg) by mouth daily 100 tablet 5     MAGNESIUM GLYCINATE PLUS PO Take 400 mg by mouth 2 times daily       metFORMIN (GLUCOPHAGE-XR) 500 MG 24 hr tablet TAKE 2 TABLETS BY MOUTH TWICE DAILY WITH MEALS 360 tablet 1     Multiple Vitamins-Minerals (MULTIVITAMIN PO) Take 1 tablet by mouth daily        Omega-3 Fatty Acids (OMEGA-3 FISH OIL PO) Take 1 g by mouth 2 times daily (with meals)        omeprazole (PRILOSEC) 40 MG DR capsule        potassium chloride ER (KLOR-CON M) 10 MEQ CR tablet Take 1 tablet (10 mEq) by mouth 2 times daily 5 tablet 3     primidone (MYSOLINE) 50 MG tablet TAKE 1 TABLET BY MOUTH THREE TIMES DAILY       semaglutide (OZEMPIC, 1 MG/DOSE,) 2 MG/1.5ML pen Inject 1 mg Subcutaneous every 7 days , on Wednesdays 6 mL 5     silver sulfADIAZINE (SILVADENE) 1 % external cream Apply topically daily 50 g 1     tadalafil (CIALIS) 5 MG tablet TAKE 1 TABLET BY  MOUTH EVERY DAY AS NEEDED one hour before intercourse 30 tablet 1     Glucagon, rDNA, (GLUCAGON EMERGENCY) 1 MG KIT Inject 1 mg into the muscle once for 1 dose 1 kit 0         ALLERGIES:  Allergies   Allergen Reactions     Bactrim [Sulfamethoxazole W/Trimethoprim] Nausea and Vomiting     Sulfa Drugs Nausea     Niacin Swelling     SIMCOR caused edema in extremities     Simvastatin Swelling     SIMCOR caused edema in extremities       PAST MEDICAL HISTORY:    Past Medical History:   Diagnosis Date     Cerebral infarction (H)     2010     Chronic infection      Hyperlipidemia LDL goal <70      Hypertension      Numbness and tingling      Obesity      GILA (obstructive sleep apnea) 10/22/2018    CPAP intolerant     PVD (peripheral vascular disease) (H)     s/p left partial toe amputation     Renal stone      Tremor      Type 2 diabetes mellitus with diabetic polyneuropathy, with long-term current use of insulin (H)        PAST SURGICAL HISTORY:    Past Surgical History:   Procedure Laterality Date     AMPUTATE FOOT Left 8/18/2016    Procedure: AMPUTATE FOOT;  Surgeon: Jack Younger DPM;  Location: [facility] OR     AMPUTATE TOE(S) Right 2/1/2016    Procedure: AMPUTATE TOE(S);  Surgeon: Rachelle Manriquez DPM, Pod;  Location: [facility] OR     AMPUTATE TOE(S) Right 8/2/2019    Procedure: Right fourth toe partial amputation for treatment of osteomyelitis.;  Surgeon: Jack Younger DPM;  Location: [facility]     AMPUTATE TOE(S) Left 11/8/2019    Procedure: Left second toe amputation at the metatarsophalangeal joint.;  Surgeon: Rachelle Manriquez DPM, Podiatry/Foot and Ankle Surgery;  Location:  OR     ANGIOGRAM       COLONOSCOPY       COMBINED CYSTOSCOPY, RETROGRADES, URETEROSCOPY, INSERT STENT Left 8/21/2016    Procedure: COMBINED CYSTOSCOPY, RETROGRADES, URETEROSCOPY, INSERT STENT;  Surgeon: Artemio Valenzuela MD;  Location:  OR     COMBINED CYSTOSCOPY, RETROGRADES, URETEROSCOPY, LASER HOLMIUM LITHOTRIPSY URETER(S), INSERT  STENT Left 10/3/2016    Procedure: COMBINED CYSTOSCOPY, RETROGRADES, URETEROSCOPY, LASER HOLMIUM LITHOTRIPSY URETER(S), INSERT STENT;  Surgeon: Artemio Valenzuela MD;  Location: [facility] OR     EXTRACORPOREAL SHOCK WAVE LITHOTRIPSY (ESWL) Left 9/8/2016    Procedure: EXTRACORPOREAL SHOCK WAVE LITHOTRIPSY (ESWL);  Surgeon: Artemio Valenzuela MD;  Location: [facility] OR     RECESSION GASTROCNEMIUS Right 2/1/2016    Procedure: RECESSION GASTROCNEMIUS;  Surgeon: Rachelle Manriquez, DPM, Pod;  Location: [facility] OR       FAMILY HISTORY:    Family History   Problem Relation Age of Onset     Arthritis Mother         SLE     Connective Tissue Disorder Mother         lupus     Diabetes Mother      Cerebrovascular Disease Mother      Cancer Mother      Diabetes Father      Diabetes Maternal Grandfather      Diabetes Sister        SOCIAL HISTORY:    Social History     Socioeconomic History     Marital status:      Spouse name: Sydney     Number of children: 2     Years of education: None     Highest education level: Master's degree (e.g., MA, MS, Arleth, MEd, MSW, ELIANA)   Occupational History     Occupation: marketing     Employer: [employer]     Comment: [employer]   Tobacco Use     Smoking status: Never Smoker     Smokeless tobacco: Never Used   Vaping Use     Vaping Use: Never used   Substance and Sexual Activity     Alcohol use: Yes     Alcohol/week: 0.0 standard drinks     Comment: rare---red wine 3x per month     Drug use: No     Sexual activity: Not Currently     Partners: Female   Other Topics Concern     Parent/sibling w/ CABG, MI or angioplasty before 65F 55M? No   Social History Narrative     None     Social Determinants of Health     Financial Resource Strain: Low Risk      Difficulty of Paying Living Expenses: Not very hard   Food Insecurity: No Food Insecurity     Worried About Running Out of Food in the Last Year: Never true     Ran Out of Food in the Last Year: Never true   Transportation Needs: No Transportation Needs  "    Lack of Transportation (Medical): No     Lack of Transportation (Non-Medical): No   Physical Activity: Sufficiently Active     Days of Exercise per Week: 4 days     Minutes of Exercise per Session: 40 min   Stress: No Stress Concern Present     Feeling of Stress : Not at all   Social Connections: Moderately Isolated     Frequency of Communication with Friends and Family: Once a week     Frequency of Social Gatherings with Friends and Family: Never     Attends Ep[employer]op[employer] Services: More than 4 times per year     Active Member of Clubs or Organizations: No     Attends Club or Organization Meetings: Not on file     Marital Status:    Intimate Partner Violence: Not on file   Housing Stability: Low Risk      Unable to Pay for Housing in the Last Year: No     Number of Places Lived in the Last Year: 2     Unstable Housing in the Last Year: No       PHYSICAL EXAM:    Vitals: /84   Pulse 63   Ht 1.778 m (5' 10\")   Wt 112 kg (247 lb)   SpO2 97%   BMI 35.44 kg/m    Wt Readings from Last 5 Encounters:   01/20/22 112 kg (247 lb)   01/19/22 108.9 kg (240 lb)   01/16/22 108.9 kg (240 lb)   01/06/22 110.7 kg (244 lb)   01/05/22 110.7 kg (244 lb)     Encounter Diagnoses   Name Primary?     Benign essential hypertension Yes     Hyperlipidemia LDL goal <70      Type 2 diabetes mellitus with peripheral neuropathy (H)      BMI 35.0-35.9,adult      Mild ascending aorta dilatation (H)      History of stroke      PVD (peripheral vascular disease) (H)        Orders Placed This Encounter   Procedures     Lipid Profile     Basic metabolic panel     Follow-Up with Cardiology       Service Date: 01/20/2022    PRIMARY CARE PROVIDER:  Johann Serna DO    REASON FOR VISIT:  Scheduled followup of hypertension, hyperlipidemia in the context of multiple cardiovascular risk factors.    HISTORY OF PRESENT ILLNESS:    It was my pleasure to follow up with Pat.  He is a delightful 61-year-old  male, owns a , lives " with his wife.  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    He has multiple cardiovascular diagnoses:  1.  Benign essential hypertension.  2.  Cerebellar stroke in 2010, treated medically with no residual sequelae.  3.  Type 2 diabetes mellitus of 22 years, on insulin, excellently controlled with an A1c of 6.1%, complicated by peripheral vascular disease and peripheral neuropathy, status post left partial toe amputation.  4.  Hyperlipidemia with goal LDL less than 70.  On atorvastatin 40 mg.  5.  Obstructive sleep apnea, CPAP intolerant.  6.  BMI 35.  7.  Chronic right bundle branch block.  8.  Family history of coronary artery disease in multiple family members.  9.  Stable mild aortic root and ascending aorta dilatation of 4.3 cm.    Pat exercises extensively.  He walks 4 miles most days, does Pilates, lifts weights, flexibility exercises and has no symptoms of angina, heart failure or arrhythmia.  He gets occasional lower extremity edema and takes furosemide 20 mg p.r.n.  BP is excellent at 137/84.  Pulse is 63 per minute and in sinus.  LDL at goal at 45, normal triglycerides of 42.  Creatinine is 0.87, HbA1c is 6.1%.  He does not use tobacco products.  Normal hemoglobin.    I reviewed his echocardiogram images with the patient.  He has a normal LV size and systolic function.  LVEF 60%-65%.  Normal diastolic function, normal left ventricular systolic function, no significant valve disease, stable dilatation of aortic root of 4.3 cm and ascending aorta 4.3 cm.  Aortic valve is trileaflet.    PHYSICAL EXAMINATION:    GENERAL:  Appears well.  CARDIOVASCULAR:  Regular heart sounds.  No murmur, no carotid bruits.  LUNGS:  Clear to auscultation.    ASSESSMENT:    Cardiovascular status stable.  Blood pressure optimally treated.  LDL at goal.  Physically active.  Does not use tobacco products.    PLAN:  1.  No changes to  medications.  2.  Cardiac prescriptions renewed for a year.  3.  Talked again about using CPAP for obstructive sleep apnea.  He is really intolerant of it and does not want to try again.  4.  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  We can get surveillance imaging every 2 or 3 years.  5.  Follow up in clinic in 1 year with fasting labs.      cc:  Johann Serna, [facility]  [address] [place] [address]      [facility] Emeli Theodore MD      D: 2022   T: 2022   MT: al    Name:     CEDRIC LAW  MRN:      [id number]        Account:      [id number]   :      1960           Service Date: 2022       Document: [id number]

## 2022-01-20 NOTE — PROGRESS NOTES
Clinic visit note dictated. Dictation reference number - [id number]        Today's clinic visit entailed:  The following tests were independently interpreted by me as noted in my documentation: ECG, labs, echocardiogram images.  Ordering of each unique test  Prescription drug management  30 minutes spent on the date of the encounter doing chart review, history and exam, documentation and further activities per the note  Provider  Link to MDM Help Grid     The level of medical decision making during this visit was of moderate complexity.        CURRENT MEDICATIONS:  Current Outpatient Medications   Medication Sig Dispense Refill     amLODIPine (NORVASC) 5 MG tablet Take 1 tablet (5 mg) by mouth 2 times daily Take one tablet twice daily  SEE MD THIS QUARTER 180 tablet 5     aspirin (ASA) 325 MG tablet Take 325 mg by mouth daily       atorvastatin (LIPITOR) 40 MG tablet Take 1 tablet (40 mg) by mouth daily Take one tablet daily SEE PROVIDER FOR NEXT REFILL 100 tablet 5     blood glucose (NO BRAND SPECIFIED) lancets standard Use to test blood sugar 3 times daily or as directed. 300 each 3     blood glucose monitoring (NO BRAND SPECIFIED) meter device kit Use to test blood sugar 3 times daily or as directed. 1 kit 0     blood glucose monitoring (NO BRAND SPECIFIED) test strip Use to test blood sugars 3 times daily or as directed 300 strip 3     cephALEXin (KEFLEX) 500 MG capsule Take 1 capsule (500 mg) by mouth 4 times daily for 7 days 28 capsule 0     Cholecalciferol (VITAMIN D3 PO) Take 1,000 Units by mouth daily        Co-Enzyme Q10 100 MG CAPS Take 100 mg by mouth daily        collagenase (SANTYL) 250 UNIT/GM external ointment Apply topically daily 30 g 1     Continuous Blood Gluc Sensor (FREESTYLE LUCERO 14 DAY SENSOR) [facility] [place] ONE EVERY 14 DAYS 2 each 3     doxycycline hyclate (VIBRAMYCIN) 100 MG capsule Take 1 capsule (100 mg) by mouth 2 times daily for 7 days 14 capsule 0     furosemide (LASIX) 20 MG tablet Take 1  tablet (20 mg) by mouth daily as needed (for leg swelling) As needed for edema 30 tablet 3     gabapentin (NEURONTIN) 100 MG capsule Take 300 mg by mouth 3 times daily       hydrochlorothiazide (MICROZIDE) 12.5 MG capsule Take 1 capsule (12.5 mg) by mouth every morning 100 capsule 5     insulin aspart (NOVOLOG PEN) 100 UNIT/ML pen Use daily with meals, up to 70 units a day 30 mL 11     insulin degludec (TRESIBA FLEXTOUCH) 100 UNIT/ML pen 45-55 units subcutaneous daily 60 mL 11     Insulin Pen Needle (PEN NEEDLES) 31G X 5 MM MISC 1 Device 4 times daily 200 each 10     ketoconazole (NIZORAL) 2 % external shampoo APPLY TOPICALLY DAILY AS NEEDED FOR ITCHING OR IRRITATION. SEE MD AFTER 6 WEEKS. 120 mL 1     lisinopril (ZESTRIL) 40 MG tablet Take 1 tablet (40 mg) by mouth daily 100 tablet 5     MAGNESIUM GLYCINATE PLUS PO Take 400 mg by mouth 2 times daily       metFORMIN (GLUCOPHAGE-XR) 500 MG 24 hr tablet TAKE 2 TABLETS BY MOUTH TWICE DAILY WITH MEALS 360 tablet 1     Multiple Vitamins-Minerals (MULTIVITAMIN PO) Take 1 tablet by mouth daily        Omega-3 Fatty Acids (OMEGA-3 FISH OIL PO) Take 1 g by mouth 2 times daily (with meals)        omeprazole (PRILOSEC) 40 MG DR capsule        potassium chloride ER (KLOR-CON M) 10 MEQ CR tablet Take 1 tablet (10 mEq) by mouth 2 times daily 5 tablet 3     primidone (MYSOLINE) 50 MG tablet TAKE 1 TABLET BY MOUTH THREE TIMES DAILY       semaglutide (OZEMPIC, 1 MG/DOSE,) 2 MG/1.5ML pen Inject 1 mg Subcutaneous every 7 days , on Wednesdays 6 mL 5     silver sulfADIAZINE (SILVADENE) 1 % external cream Apply topically daily 50 g 1     tadalafil (CIALIS) 5 MG tablet TAKE 1 TABLET BY MOUTH EVERY DAY AS NEEDED one hour before intercourse 30 tablet 1     Glucagon, rDNA, (GLUCAGON EMERGENCY) 1 MG KIT Inject 1 mg into the muscle once for 1 dose 1 kit 0         ALLERGIES:  Allergies   Allergen Reactions     Bactrim [Sulfamethoxazole W/Trimethoprim] Nausea and Vomiting     Sulfa Drugs Nausea      Niacin Swelling     SIMCOR caused edema in extremities     Simvastatin Swelling     SIMCOR caused edema in extremities       PAST MEDICAL HISTORY:    Past Medical History:   Diagnosis Date     Cerebral infarction (H)     2010     Chronic infection      Hyperlipidemia LDL goal <70      Hypertension      Numbness and tingling      Obesity      GILA (obstructive sleep apnea) 10/22/2018    CPAP intolerant     PVD (peripheral vascular disease) (H)     s/p left partial toe amputation     Renal stone      Tremor      Type 2 diabetes mellitus with diabetic polyneuropathy, with long-term current use of insulin (H)        PAST SURGICAL HISTORY:    Past Surgical History:   Procedure Laterality Date     AMPUTATE FOOT Left 8/18/2016    Procedure: AMPUTATE FOOT;  Surgeon: Jack Younger DPM;  Location: [facility] OR     AMPUTATE TOE(S) Right 2/1/2016    Procedure: AMPUTATE TOE(S);  Surgeon: Rachelle Manriquez DPM, Pod;  Location: [facility] OR     AMPUTATE TOE(S) Right 8/2/2019    Procedure: Right fourth toe partial amputation for treatment of osteomyelitis.;  Surgeon: Jack Younger DPM;  Location: [facility]     AMPUTATE TOE(S) Left 11/8/2019    Procedure: Left second toe amputation at the metatarsophalangeal joint.;  Surgeon: Rachelle Manriquez DPM, Podiatry/Foot and Ankle Surgery;  Location: [facility] OR     ANGIOGRAM       COLONOSCOPY       COMBINED CYSTOSCOPY, RETROGRADES, URETEROSCOPY, INSERT STENT Left 8/21/2016    Procedure: COMBINED CYSTOSCOPY, RETROGRADES, URETEROSCOPY, INSERT STENT;  Surgeon: Artemio Vaelnzuela MD;  Location: [facility] OR     COMBINED CYSTOSCOPY, RETROGRADES, URETEROSCOPY, LASER HOLMIUM LITHOTRIPSY URETER(S), INSERT STENT Left 10/3/2016    Procedure: COMBINED CYSTOSCOPY, RETROGRADES, URETEROSCOPY, LASER HOLMIUM LITHOTRIPSY URETER(S), INSERT STENT;  Surgeon: Artemio Valenzuela MD;  Location: [facility] OR     EXTRACORPOREAL SHOCK WAVE LITHOTRIPSY (ESWL) Left 9/8/2016    Procedure: EXTRACORPOREAL SHOCK WAVE LITHOTRIPSY (ESWL);   Surgeon: Artemio Valenzuela MD;  Location: [facility] OR     RECESSION GASTROCNEMIUS Right 2/1/2016    Procedure: RECESSION GASTROCNEMIUS;  Surgeon: Rachelle Manriquez, DPM, Pod;  Location: [facility] OR       FAMILY HISTORY:    Family History   Problem Relation Age of Onset     Arthritis Mother         SLE     Connective Tissue Disorder Mother         lupus     Diabetes Mother      Cerebrovascular Disease Mother      Cancer Mother      Diabetes Father      Diabetes Maternal Grandfather      Diabetes Sister        SOCIAL HISTORY:    Social History     Socioeconomic History     Marital status:      Spouse name: Sydney     Number of children: 2     Years of education: None     Highest education level: Master's degree (e.g., MA, MS, Arleth, MEd, MSW, ELIANA)   Occupational History     Occupation: marketing     Employer: [employer]     Comment: [employer]   Tobacco Use     Smoking status: Never Smoker     Smokeless tobacco: Never Used   Vaping Use     Vaping Use: Never used   Substance and Sexual Activity     Alcohol use: Yes     Alcohol/week: 0.0 standard drinks     Comment: rare---red wine 3x per month     Drug use: No     Sexual activity: Not Currently     Partners: Female   Other Topics Concern     Parent/sibling w/ CABG, MI or angioplasty before 65F 55M? No   Social History Narrative     None     Social Determinants of Health     Financial Resource Strain: Low Risk      Difficulty of Paying Living Expenses: Not very hard   Food Insecurity: No Food Insecurity     Worried About Running Out of Food in the Last Year: Never true     Ran Out of Food in the Last Year: Never true   Transportation Needs: No Transportation Needs     Lack of Transportation (Medical): No     Lack of Transportation (Non-Medical): No   Physical Activity: Sufficiently Active     Days of Exercise per Week: 4 days     Minutes of Exercise per Session: 40 min   Stress: No Stress Concern Present     Feeling of Stress : Not at all   Social Connections: Moderately  "Isolated     Frequency of Communication with Friends and Family: Once a week     Frequency of Social Gatherings with Friends and Family: Never     Attends Pentecostalism Services: More than 4 times per year     Active Member of Clubs or Organizations: No     Attends Club or Organization Meetings: Not on file     Marital Status:    Intimate Partner Violence: Not on file   Housing Stability: Low Risk      Unable to Pay for Housing in the Last Year: No     Number of Places Lived in the Last Year: 2     Unstable Housing in the Last Year: No       PHYSICAL EXAM:    Vitals: /84   Pulse 63   Ht 1.778 m (5' 10\")   Wt 112 kg (247 lb)   SpO2 97%   BMI 35.44 kg/m    Wt Readings from Last 5 Encounters:   01/20/22 112 kg (247 lb)   01/19/22 108.9 kg (240 lb)   01/16/22 108.9 kg (240 lb)   01/06/22 110.7 kg (244 lb)   01/05/22 110.7 kg (244 lb)               Encounter Diagnoses   Name Primary?     Benign essential hypertension Yes     Hyperlipidemia LDL goal <70      Type 2 diabetes mellitus with peripheral neuropathy (H)      BMI 35.0-35.9,adult      Mild ascending aorta dilatation (H)      History of stroke      PVD (peripheral vascular disease) (H)        Orders Placed This Encounter   Procedures     Lipid Profile     Basic metabolic panel     Follow-Up with Cardiology             "

## 2022-01-25 DIAGNOSIS — E11.42 TYPE 2 DIABETES MELLITUS WITH PERIPHERAL NEUROPATHY (H): ICD-10-CM

## 2022-01-26 RX ORDER — FLURBIPROFEN SODIUM 0.3 MG/ML
SOLUTION/ DROPS OPHTHALMIC
Qty: 500 EACH | Refills: 0 | Status: SHIPPED | OUTPATIENT
Start: 2022-01-26 | End: 2022-06-13

## 2022-02-04 ENCOUNTER — TRANSFERRED RECORDS (OUTPATIENT)
Dept: HEALTH INFORMATION MANAGEMENT | Facility: CLINIC | Age: 62
End: 2022-02-04
Payer: COMMERCIAL

## 2022-02-04 LAB
RETINOPATHY: NORMAL
RETINOPATHY: POSITIVE

## 2022-02-07 ENCOUNTER — TELEPHONE (OUTPATIENT)
Dept: FAMILY MEDICINE | Facility: CLINIC | Age: 62
End: 2022-02-07
Payer: COMMERCIAL

## 2022-02-07 NOTE — TELEPHONE ENCOUNTER
Patient Quality Outreach    Patient is due for the following:   Diabetes -  Eye Exam    NEXT STEPS:   Chart routed to abstraction.    Vitreo retinal surgery on 2-4-22    Type of outreach:    Chart review performed, no outreach needed.      Questions for provider review:    None     Karen Hein MA

## 2022-02-08 ENCOUNTER — HOSPITAL ENCOUNTER (OUTPATIENT)
Dept: NUCLEAR MEDICINE | Facility: CLINIC | Age: 62
Setting detail: NUCLEAR MEDICINE
Discharge: HOME | End: 2022-02-08
Attending: INTERNAL MEDICINE | Admitting: INTERNAL MEDICINE
Payer: COMMERCIAL

## 2022-02-08 ENCOUNTER — THERAPY VISIT (OUTPATIENT)
Dept: PHYSICAL THERAPY | Facility: CLINIC | Age: 62
End: 2022-02-08
Payer: COMMERCIAL

## 2022-02-08 DIAGNOSIS — M54.2 CERVICAL PAIN: ICD-10-CM

## 2022-02-08 DIAGNOSIS — K59.00 CONSTIPATION, UNSPECIFIED CONSTIPATION TYPE: ICD-10-CM

## 2022-02-08 DIAGNOSIS — K74.60 CIRRHOSIS OF LIVER WITHOUT ASCITES, UNSPECIFIED HEPATIC CIRRHOSIS TYPE (H): ICD-10-CM

## 2022-02-08 DIAGNOSIS — M54.6 BILATERAL THORACIC BACK PAIN: ICD-10-CM

## 2022-02-08 DIAGNOSIS — R11.2 NON-INTRACTABLE VOMITING WITH NAUSEA, UNSPECIFIED VOMITING TYPE: ICD-10-CM

## 2022-02-08 PROCEDURE — A9541 TC99M SULFUR COLLOID: HCPCS | Performed by: INTERNAL MEDICINE

## 2022-02-08 PROCEDURE — 97110 THERAPEUTIC EXERCISES: CPT | Mod: GP | Performed by: PHYSICAL THERAPIST

## 2022-02-08 PROCEDURE — 78264 GASTRIC EMPTYING IMG STUDY: CPT

## 2022-02-08 PROCEDURE — 343N000001 HC RX 343: Performed by: INTERNAL MEDICINE

## 2022-02-08 PROCEDURE — 97035 APP MDLTY 1+ULTRASOUND EA 15: CPT | Mod: GP | Performed by: PHYSICAL THERAPIST

## 2022-02-08 PROCEDURE — 97140 MANUAL THERAPY 1/> REGIONS: CPT | Mod: GP | Performed by: PHYSICAL THERAPIST

## 2022-02-08 RX ADMIN — Medication 2 MILLICURIE: at 08:26

## 2022-02-12 DIAGNOSIS — I10 BENIGN ESSENTIAL HYPERTENSION: ICD-10-CM

## 2022-02-12 DIAGNOSIS — E11.42 TYPE 2 DIABETES MELLITUS WITH PERIPHERAL NEUROPATHY (H): ICD-10-CM

## 2022-02-14 ENCOUNTER — TRANSFERRED RECORDS (OUTPATIENT)
Dept: SURGERY | Facility: CLINIC | Age: 62
End: 2022-02-14
Payer: COMMERCIAL

## 2022-02-14 RX ORDER — LISINOPRIL 40 MG/1
TABLET ORAL
Qty: 90 TABLET | OUTPATIENT
Start: 2022-02-14

## 2022-02-15 RX ORDER — FLASH GLUCOSE SENSOR
KIT MISCELLANEOUS
Qty: 6 EACH | Refills: 1 | Status: SHIPPED | OUTPATIENT
Start: 2022-02-15 | End: 2022-07-07

## 2022-02-23 ENCOUNTER — THERAPY VISIT (OUTPATIENT)
Dept: PHYSICAL THERAPY | Facility: CLINIC | Age: 62
End: 2022-02-23
Payer: COMMERCIAL

## 2022-02-23 ENCOUNTER — PRE VISIT (OUTPATIENT)
Dept: OTOLARYNGOLOGY | Facility: CLINIC | Age: 62
End: 2022-02-23

## 2022-02-23 ENCOUNTER — OFFICE VISIT (OUTPATIENT)
Dept: OTOLARYNGOLOGY | Facility: CLINIC | Age: 62
End: 2022-02-23
Attending: FAMILY MEDICINE
Payer: COMMERCIAL

## 2022-02-23 VITALS
BODY MASS INDEX: 35.36 KG/M2 | TEMPERATURE: 99 F | WEIGHT: 247 LBS | SYSTOLIC BLOOD PRESSURE: 121 MMHG | DIASTOLIC BLOOD PRESSURE: 67 MMHG | HEIGHT: 70 IN | HEART RATE: 68 BPM | OXYGEN SATURATION: 96 %

## 2022-02-23 DIAGNOSIS — M54.6 BILATERAL THORACIC BACK PAIN: ICD-10-CM

## 2022-02-23 DIAGNOSIS — R09.81 NASAL CONGESTION: ICD-10-CM

## 2022-02-23 DIAGNOSIS — R04.0 EPISTAXIS: Primary | ICD-10-CM

## 2022-02-23 DIAGNOSIS — J34.89 NASAL CRUSTING: ICD-10-CM

## 2022-02-23 DIAGNOSIS — M54.2 CERVICAL PAIN: ICD-10-CM

## 2022-02-23 PROCEDURE — 31231 NASAL ENDOSCOPY DX: CPT | Performed by: OTOLARYNGOLOGY

## 2022-02-23 PROCEDURE — 99202 OFFICE O/P NEW SF 15 MIN: CPT | Mod: 25 | Performed by: OTOLARYNGOLOGY

## 2022-02-23 PROCEDURE — 97140 MANUAL THERAPY 1/> REGIONS: CPT | Mod: GP | Performed by: PHYSICAL THERAPIST

## 2022-02-23 PROCEDURE — 97110 THERAPEUTIC EXERCISES: CPT | Mod: GP | Performed by: PHYSICAL THERAPIST

## 2022-02-23 PROCEDURE — 97035 APP MDLTY 1+ULTRASOUND EA 15: CPT | Mod: GP | Performed by: PHYSICAL THERAPIST

## 2022-02-23 RX ORDER — CALCIUM CARBONATE 500(1250)
1 TABLET ORAL 2 TIMES DAILY
COMMUNITY

## 2022-02-23 RX ORDER — MUPIROCIN 20 MG/G
OINTMENT TOPICAL
Qty: 30 G | Refills: 1 | Status: SHIPPED | OUTPATIENT
Start: 2022-02-23 | End: 2022-06-03

## 2022-02-23 ASSESSMENT — PAIN SCALES - GENERAL: PAINLEVEL: MILD PAIN (3)

## 2022-02-23 NOTE — LETTER
2/23/2022       RE: Crispin Rojas  [id number] [address] [facility] [address]     Dear Colleague,    Thank you for referring your patient, Crispin Rojas, to the [facility] [place] [facility]. Please see a copy of my visit note below.      [facility]  New Patient Visit      Encounter date:   February 23, 2022    Referring Provider:   Johann Serna DO  [address] [place] [address]    Chief Complaint: Epistaxis     History of Present Illness:   Crispin Rojas is a 61 year old male with history of diabetes mellitus type 2 on Asprin 325 for CVA since 2010 who presents for consultation regarding left sided epistaxis that was examined by his primary on 1/3/22. Here he tells me that he has appreciated a small growth on the left septum. Each morning he will need to blow the nose and this often has dried blood interlaced with mucus. He has not had to present to an ER for bleeding or be placed on antibiotics.     Sino-Nasal Outcome Test (SNOT - 22)  [facility] Operative History:  No sinonasal surgeries    Review of systems: A 14-point review of systems has been conducted and was negative for any notable symptoms, except as dictated in the history of present illness.     Medical History:  Past Medical History:   Diagnosis Date     Cerebral infarction (H)     2010     Chronic infection      Hyperlipidemia LDL goal <70      Hypertension      Numbness and tingling      Obesity      GILA (obstructive sleep apnea) 10/22/2018    CPAP intolerant     PVD (peripheral vascular disease) (H)     s/p left partial toe amputation     Renal stone      Tremor      Type 2 diabetes mellitus with diabetic polyneuropathy, with long-term current use of insulin (H)         Surgical History:   Past Surgical History:   Procedure Laterality Date     AMPUTATE FOOT Left 8/18/2016    Procedure: AMPUTATE FOOT;  Surgeon: Jack Younger  SHRUTHI;  Location: [facility] OR     AMPUTATE TOE(S) Right 2/1/2016    Procedure: AMPUTATE TOE(S);  Surgeon: Rachelle Manriquez DPM, Pod;  Location: [facility] OR     AMPUTATE TOE(S) Right 8/2/2019    Procedure: Right fourth toe partial amputation for treatment of osteomyelitis.;  Surgeon: Jack Younger DPM;  Location: [facility] OR     AMPUTATE TOE(S) Left 11/8/2019    Procedure: Left second toe amputation at the metatarsophalangeal joint.;  Surgeon: Rachelle Manriquez DPM, Podiatry/Foot and Ankle Surgery;  Location: [facility] OR     ANGIOGRAM       COLONOSCOPY       COMBINED CYSTOSCOPY, RETROGRADES, URETEROSCOPY, INSERT STENT Left 8/21/2016    Procedure: COMBINED CYSTOSCOPY, RETROGRADES, URETEROSCOPY, INSERT STENT;  Surgeon: Artemio Valenzuela MD;  Location: [facility] OR     COMBINED CYSTOSCOPY, RETROGRADES, URETEROSCOPY, LASER HOLMIUM LITHOTRIPSY URETER(S), INSERT STENT Left 10/3/2016    Procedure: COMBINED CYSTOSCOPY, RETROGRADES, URETEROSCOPY, LASER HOLMIUM LITHOTRIPSY URETER(S), INSERT STENT;  Surgeon: Artemio Valenzuela MD;  Location: [facility] OR     EXTRACORPOREAL SHOCK WAVE LITHOTRIPSY (ESWL) Left 9/8/2016    Procedure: EXTRACORPOREAL SHOCK WAVE LITHOTRIPSY (ESWL);  Surgeon: Artemio Valenzuela MD;  Location: [facility] OR     RECESSION GASTROCNEMIUS Right 2/1/2016    Procedure: RECESSION GASTROCNEMIUS;  Surgeon: Rachelle Manriquez DPM, Pod;  Location: [facility] OR        Family History:  Family History   Problem Relation Age of Onset     Arthritis Mother         SLE     Connective Tissue Disorder Mother         lupus     Diabetes Mother      Cerebrovascular Disease Mother      Cancer Mother      Diabetes Father      Diabetes Maternal Grandfather      Diabetes Sister         Social History:   Social History     Socioeconomic History     Marital status:      Spouse name: Sydney     Number of children: 2     Years of education: Not on file     Highest education level: Master's degree (e.g., MA, MS, Arleth, MEd, MSW, ELIANA)   Occupational History      Occupation: marketing     Employer: [employer]     Comment: [employer]   Tobacco Use     Smoking status: Never Smoker     Smokeless tobacco: Never Used   Vaping Use     Vaping Use: Never used   Substance and Sexual Activity     Alcohol use: Yes     Alcohol/week: 0.0 standard drinks     Comment: rare---red wine 3x per month     Drug use: No     Sexual activity: Not Currently     Partners: Female   Other Topics Concern     Parent/sibling w/ CABG, MI or angioplasty before 65F 55M? No   Social History Narrative     Not on file     Social Determinants of Health     Financial Resource Strain: Low Risk      Difficulty of Paying Living Expenses: Not very hard   Food Insecurity: No Food Insecurity     Worried About Running Out of Food in the Last Year: Never true     Ran Out of Food in the Last Year: Never true   Transportation Needs: No Transportation Needs     Lack of Transportation (Medical): No     Lack of Transportation (Non-Medical): No   Physical Activity: Sufficiently Active     Days of Exercise per Week: 4 days     Minutes of Exercise per Session: 40 min   Stress: No Stress Concern Present     Feeling of Stress : Not at all   Social Connections: Moderately Isolated     Frequency of Communication with Friends and Family: Once a week     Frequency of Social Gatherings with Friends and Family: Never     Attends Yazidi Services: More than 4 times per year     Active Member of Clubs or Organizations: No     Attends Club or Organization Meetings: Not on file     Marital Status:    Intimate Partner Violence: Not on file   Housing Stability: Low Risk      Unable to Pay for Housing in the Last Year: No     Number of Places Lived in the Last Year: 2     Unstable Housing in the Last Year: No        Physical Exam:  Vital signs: There were no vitals taken for this visit.   General Appearance: No acute distress, appropriate demeanor, conversant  Eyes: moist conjunctivae; EOMI; pupils symmetric; visual acuity grossly  intact; no proptosis  Head: normocephalic; overall symmetric appearance without deformity  Face: overall symmetric without deformity; HB I/VI  Nose: No external deformity; septum deviated to the right; see endoscopy  Lungs: symmetric chest rise; no wheezing  CV: Good distal perfusion; normal hear rate  Extremities: No deformity  Neurologic Exam: Cranial nerves II-XII are grossly intact; no focal deficit      Procedure Note  Procedure performed: Rigid nasal endoscopy  Indication: To evaluate for sinonasal pathology not visualized on routine anterior rhinoscopy  Anesthesia: 4% topical lidocaine with 0.05% oxymetazoline  Description of procedure: A 30 degree, 3 mm rigid endoscope was inserted into bilateral nasal cavities and the nasal valves, nasal cavity, middle meatus, sphenoethmoid recess, and nasopharynx were thoroughly evaluated for evidence of obstruction, edema, purulence, polyps and/or mass/lesion.     Joe-Haris Endoscopic Scoring System  Endoscopic observation Right Left   Polyps in middle meatus (0 = absent, 1 = restricted to middle meatus, 2 = Beyond middle meatus) 0 0   Discharge (0 = absent, 1 = thin and clear, 2 = thick, purulent) 0 0   Edema (0 = absent, 1 = mild-moderate, 2 = moderate-severe) 0 1   Crusting (0 = absent, 1 = mild-moderate, 2 = moderate-severe) 0 1   Scarring (0= absent, 1 = mild-moderate, 2 = moderate-severe) 0 0   Total 0 2     Findings  RT: MM and SER clear  LT: Crusting along the caudal septum; squamous metaplasia; otherwise MM and SER clear.   Nasopharynx clear     The patient tolerated the procedure well without complication.     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 He should avoid blowing/picking the nose. If there are  no improvements over a month of treatment, he can follow up in person. We discussed difficulty in reversing this issue as it seems this has been a chronic, ongoing issue for many years.     Jack Tucker MD    [facility]  Rhinology  Endoscopic Skull Base Surgery  [facility]  Department of Otolaryngology - Head & Neck Surgery    Scribe Disclosure:  I, Walt Haley, am serving as a scribe to document services personally performed by Jack Tucker MD at this visit, based upon the provider's statements to me. All documentation has been reviewed by the aforementioned provider prior to being entered into the official medical record.       Again, thank you for allowing me to participate in the care of your patient.      Sincerely,    Jack Tucker MD

## 2022-02-23 NOTE — NURSING NOTE
"Chief Complaint   Patient presents with     Consult     epistaxis    Blood pressure 121/67, pulse 68, temperature 99  F (37.2  C), temperature source Temporal, height 1.778 m (5' 10\"), weight 112 kg (247 lb), SpO2 96 %. Maria G Rey, EMT  "

## 2022-02-23 NOTE — PATIENT INSTRUCTIONS
"1. You were seen in the clinic today by Dr. Tucker. If you have any questions or concerns after your appointment, please call the clinic at [phone]. Press \"1\" for scheduling, press \"3\" for nurse advice.    2.   The following has been recommended for you based upon your appointment today:   -Mupirocin ointment twice daily for the next month. After that you can transition to Vaseline or Aquaphor as needed. A prescription for the Mupirocin has been sent to your preferred pharmacy.    3.   If symptoms worsen or fail to improve, please return to the ENT clinic.       Naye Henson LPN  [facility]  Department of Otolaryngology  [phone]    "

## 2022-02-24 ENCOUNTER — TRANSFERRED RECORDS (OUTPATIENT)
Dept: SURGERY | Facility: CLINIC | Age: 62
End: 2022-02-24
Payer: COMMERCIAL

## 2022-02-24 ENCOUNTER — TRANSFERRED RECORDS (OUTPATIENT)
Dept: HEALTH INFORMATION MANAGEMENT | Facility: CLINIC | Age: 62
End: 2022-02-24
Payer: COMMERCIAL

## 2022-03-03 ENCOUNTER — OFFICE VISIT (OUTPATIENT)
Dept: PALLIATIVE MEDICINE | Facility: CLINIC | Age: 62
End: 2022-03-03
Payer: COMMERCIAL

## 2022-03-03 ENCOUNTER — THERAPY VISIT (OUTPATIENT)
Dept: PHYSICAL THERAPY | Facility: CLINIC | Age: 62
End: 2022-03-03
Payer: COMMERCIAL

## 2022-03-03 VITALS — DIASTOLIC BLOOD PRESSURE: 65 MMHG | SYSTOLIC BLOOD PRESSURE: 122 MMHG

## 2022-03-03 DIAGNOSIS — M54.6 BILATERAL THORACIC BACK PAIN: ICD-10-CM

## 2022-03-03 DIAGNOSIS — M54.2 CERVICAL PAIN: ICD-10-CM

## 2022-03-03 DIAGNOSIS — G89.29 CHRONIC LEFT-SIDED THORACIC BACK PAIN: ICD-10-CM

## 2022-03-03 DIAGNOSIS — M79.10 TRIGGER POINT: Primary | ICD-10-CM

## 2022-03-03 DIAGNOSIS — M54.6 CHRONIC LEFT-SIDED THORACIC BACK PAIN: ICD-10-CM

## 2022-03-03 PROCEDURE — 97012 MECHANICAL TRACTION THERAPY: CPT | Mod: GP | Performed by: PHYSICAL THERAPIST

## 2022-03-03 PROCEDURE — 97140 MANUAL THERAPY 1/> REGIONS: CPT | Mod: GP | Performed by: PHYSICAL THERAPIST

## 2022-03-03 PROCEDURE — 97035 APP MDLTY 1+ULTRASOUND EA 15: CPT | Mod: GP | Performed by: PHYSICAL THERAPIST

## 2022-03-03 PROCEDURE — 99244 OFF/OP CNSLTJ NEW/EST MOD 40: CPT | Performed by: PHYSICAL MEDICINE & REHABILITATION

## 2022-03-03 ASSESSMENT — PAIN SCALES - GENERAL: PAINLEVEL: NO PAIN (0)

## 2022-03-03 NOTE — PATIENT INSTRUCTIONS
1. I will message physical therapy about adding exercises for your trapezius and rhomboid muscles.    2. You can also buy a Theracane to help massage those tender muscles.    3. If no improvement in about 6 weeks, call the number below and we can try trigger point injections for these muscles.    Take care,    Mack Fowler DO  [facility]        ----------------------------------------------------------------  Clinic Number:  [phone]     Call with any questions about your care and for scheduling assistance.     Calls are returned Monday through Friday between 8 AM and 4:30 PM. We usually get back to you within 2 business days depending on the issue/request.    If we are prescribing your medications:    For opioid medication refills, call the clinic or send a [employer] message 7 days in advance.  Please include:    Name of requested medication    Name of the pharmacy.    For non-opioid medications, call your pharmacy directly to request a refill. Please allow 3-4 days to be processed.     Per [place] State Law:    All controlled substance prescriptions must be filled within 30 days of being written.      For those controlled substances allowing refills, pickup must occur within 30 days of last fill.      We believe regular attendance is key to your success in our program!      Any time you are unable to keep your appointment we ask that you call us at least 24 hours in advance to cancel.This will allow us to offer the appointment time to another patient.     Multiple missed appointments may lead to dismissal from the clinic.

## 2022-03-03 NOTE — PROGRESS NOTES
[facility] Consultation    Date of visit: 3/3/2022      Assessment:  Judie Rojas is a 61 year old with past medical history including: HTN, HLD, CVA in 2010, GILA, Obesity, PVD, DM 2 with neuropathy who presents for evaluation and treatment of the following chronic pain conditions:    1. Chronic left periscapular pain: Judie reports a long standing history of left sided upper back/periscapular pain. On exam today they have trigger points in the left trapezius and left rhomboid. They have done some PT with minimal benefit although note improvement with traction today. They will follow-up with PT to continue exercises for this area and try a theracane for massage as well. If limited improvement, they will call to request trigger point injections.      Plan:  The following recommendations were given to the patient. Diagnosis, treatment options, risks, benefits, and alternatives were discussed, and all questions were answered. The patient expressed understanding of the plan for management.     I am recommending a multidisciplinary treatment plan to help this patient better manage his pain.  This includes:     1. Physical Therapy: Rachelle fowler to add exercises for the trapezius and rhomboid.  2. Clinical Health Pain Psychologist: coping well, defer.   1. Therapy can help reduce physical and psychosocial triggers or reinforcers of pain by adapting thoughts, feelings and behaviors to reduce symptoms and increase quality of life.  Evidence indicates that the practice of relaxation, meditation, and mindfulness techniques can significantly affect pain levels and overall well being.  3. Self Care Recommendations: Maribel progressive exercise that does not increase pain - gradually increase daily walking program.  Take mini breaks - 5 minutes of mindfullness a couple times a day.   4. Diagnostic Studies: none, consider thoracic MRI if no improvement with  TPI.  1. Medication Management: Patient defers.      5. Further procedures recommended: consider TPI  6. Follow up: 6 weeks or as needed.      Reason for consultation:    Crispin Rojas is a 61 year old male who is seen in consultation today at the request of Ricarda Zayas PA-C     Consultation and Evaluation for: chronic thoracic pain    Review of [place] Prescription Monitoring Program (): Today I have also reviewed the patient's history of controlled substance use, as provided by [place] licensed pharmacies and prescriber dispensers.     Review of Pain Questionnaire: Please see the [facility] health questionnaire, which the patient completed and reviewed with me in detail.    Review of Electronic Chart: Today I have also reviewed available medical information in the patient's medical record at [facility] including relevant provider notes, laboratory work, and imaging.     Crispin Rojas has not been seen at a pain clinic in the past.      Chief Complaint:    Chief Complaint   Patient presents with     Pain       Pain history:  Crispin Rojas is a 61 year old male who presents for initial evaluation of chief pain complaint of thoracic pain.     Pat began having right sided neck pain in July of 2021 and left sided periscapular pain about 1.5 years ago and was recently evaluated in the neurosurgery clinic. They were referred to PT which has helped significantly with neck pain but not as much with the left sided pain. Traction today did help with this pain.    The left sided shoulder blade pain has been constant except for a couple days here and there when the pain was improved. This feels like a burning pain, no effect from breathing. No change with shoulder range of motion. They haven't noted any specific that makes this pain better or worse.    Onset: 1.5 years  Location/Radiation: denies  Quality: aching  Severity/Intensity: 6/10 at worst, 0/10 at best, 6/10 on average. Neck  was between 3-8 and is now a 1-2 on average after PT.  Aggravating factors include: none  Relieving factors include: leaning head back, traction  Red Flags: The patient denies bowel or bladder incontinence, parasthesias, weakness, saddle anesthesia, unintentional weight loss, or fever/chills/sweats.         Pain Treatments:  1. Medications:       Current pain medications:  -Gabapentin 300mg TID - started for occipital neuralgia, helpful for diabetic neuropathy       Previous pain medications:  None  2. Physical Therapy: helped neck pain     TENS unit: nt  3. Pain psychology: nt  4. Surgery: nt  5. Injections: nt  6. Alternative Therapies:    Chiropractic: nt    Acupuncture: nt      Diagnostic tests:  XR of T-Spine was completed on 1/3/22 was reviewed with the patient and shows:                                                                   IMPRESSION: Broad, mild convex right thoracic curvature. Mild  chronic-appearing height loss of several mid to lower thoracic  vertebral bodies. No acute fracture is evident. There is associated  mild interspace and endplate degeneration.    Labs:   Lab Results   Component Value Date    WBC 6.5 05/10/2021     Lab Results   Component Value Date    RBC 4.14 05/10/2021     Lab Results   Component Value Date    HGB 12.1 05/10/2021     Lab Results   Component Value Date    HCT 37.8 05/10/2021     Lab Results   Component Value Date    MCV 91 05/10/2021     Lab Results   Component Value Date    MCH 29.2 05/10/2021     Lab Results   Component Value Date    MCHC 32.0 05/10/2021     Lab Results   Component Value Date    RDW 13.3 05/10/2021     Lab Results   Component Value Date     05/10/2021     Last Comprehensive Metabolic Panel:  Sodium   Date Value Ref Range Status   01/03/2022 140 133 - 144 mmol/L Final   05/10/2021 139 133 - 144 mmol/L Final     Potassium   Date Value Ref Range Status   01/03/2022 4.2 3.4 - 5.3 mmol/L Final   05/10/2021 3.6 3.4 - 5.3 mmol/L Final      Chloride   Date Value Ref Range Status   01/03/2022 108 94 - 109 mmol/L Final   05/10/2021 108 94 - 109 mmol/L Final     Carbon Dioxide   Date Value Ref Range Status   05/10/2021 27 20 - 32 mmol/L Final     Carbon Dioxide (CO2)   Date Value Ref Range Status   01/03/2022 27 20 - 32 mmol/L Final     Anion Gap   Date Value Ref Range Status   01/03/2022 5 3 - 14 mmol/L Final   05/10/2021 4 3 - 14 mmol/L Final     Glucose   Date Value Ref Range Status   01/03/2022 132 (H) 70 - 99 mg/dL Final   05/10/2021 68 (L) 70 - 99 mg/dL Final     Urea Nitrogen   Date Value Ref Range Status   01/03/2022 15 7 - 30 mg/dL Final   05/10/2021 15 7 - 30 mg/dL Final     Creatinine   Date Value Ref Range Status   01/03/2022 0.87 0.66 - 1.25 mg/dL Final   05/10/2021 0.86 0.66 - 1.25 mg/dL Final     GFR Estimate   Date Value Ref Range Status   01/03/2022 >90 >60 mL/min/1.73m2 Final     Comment:     Effective December 21, 2021 eGFRcr in adults is calculated using the 2021 CKD-EPI creatinine equation which includes age and gender (Beverly et al., NEJ, DOI: 10.1056/XPJPpc0988004)   05/10/2021 >90 >60 mL/min/[1.73_m2] Final     Comment:     Non  GFR Calc  Starting 12/18/2018, serum creatinine based estimated GFR (eGFR) will be   calculated using the Chronic Kidney Disease Epidemiology Collaboration   (CKD-EPI) equation.       Calcium   Date Value Ref Range Status   01/03/2022 8.4 (L) 8.5 - 10.1 mg/dL Final   05/10/2021 8.5 8.5 - 10.1 mg/dL Final     Bilirubin Total   Date Value Ref Range Status   05/10/2021 0.5 0.2 - 1.3 mg/dL Final     Alkaline Phosphatase   Date Value Ref Range Status   05/10/2021 54 40 - 150 U/L Final     ALT   Date Value Ref Range Status   05/10/2021 56 0 - 70 U/L Final     AST   Date Value Ref Range Status   05/10/2021 38 0 - 45 U/L Final     Past Medical History:  Past Medical History:   Diagnosis Date     Cerebral infarction (H)     2010     Chronic infection      Hyperlipidemia LDL goal <70       Hypertension      Numbness and tingling      Obesity      GILA (obstructive sleep apnea) 10/22/2018    CPAP intolerant     PVD (peripheral vascular disease) (H)     s/p left partial toe amputation     Renal stone      Tremor      Type 2 diabetes mellitus with diabetic polyneuropathy, with long-term current use of insulin (H)        Past Surgical History:  Past Surgical History:   Procedure Laterality Date     AMPUTATE FOOT Left 8/18/2016    Procedure: AMPUTATE FOOT;  Surgeon: Jack Yonuger DPM;  Location: [facility] OR     AMPUTATE TOE(S) Right 2/1/2016    Procedure: AMPUTATE TOE(S);  Surgeon: Rachelle Manriquez DPM, Pod;  Location: [facility] OR     AMPUTATE TOE(S) Right 8/2/2019    Procedure: Right fourth toe partial amputation for treatment of osteomyelitis.;  Surgeon: Jack Younger DPM;  Location: [facility] OR     AMPUTATE TOE(S) Left 11/8/2019    Procedure: Left second toe amputation at the metatarsophalangeal joint.;  Surgeon: Rachelle Manriquez DPM, Podiatry/Foot and Ankle Surgery;  Location: [facility] OR     ANGIOGRAM       COLONOSCOPY       COMBINED CYSTOSCOPY, RETROGRADES, URETEROSCOPY, INSERT STENT Left 8/21/2016    Procedure: COMBINED CYSTOSCOPY, RETROGRADES, URETEROSCOPY, INSERT STENT;  Surgeon: Artemio Valenzuela MD;  Location: [facility] OR     COMBINED CYSTOSCOPY, RETROGRADES, URETEROSCOPY, LASER HOLMIUM LITHOTRIPSY URETER(S), INSERT STENT Left 10/3/2016    Procedure: COMBINED CYSTOSCOPY, RETROGRADES, URETEROSCOPY, LASER HOLMIUM LITHOTRIPSY URETER(S), INSERT STENT;  Surgeon: Artemio Valenzuela MD;  Location: [facility] OR     EXTRACORPOREAL SHOCK WAVE LITHOTRIPSY (ESWL) Left 9/8/2016    Procedure: EXTRACORPOREAL SHOCK WAVE LITHOTRIPSY (ESWL);  Surgeon: Artemio Valenzuela MD;  Location: [facility] OR     RECESSION GASTROCNEMIUS Right 2/1/2016    Procedure: RECESSION GASTROCNEMIUS;  Surgeon: Rachelle Manriquez DPM, Pod;  Location: [facility] OR       Medications:  Current Outpatient Medications   Medication Sig Dispense Refill     amLODIPine  (NORVASC) 5 MG tablet Take 1 tablet (5 mg) by mouth 2 times daily Take one tablet twice daily  SEE MD THIS QUARTER 180 tablet 5     aspirin (ASA) 325 MG tablet Take 325 mg by mouth daily       atorvastatin (LIPITOR) 40 MG tablet Take 1 tablet (40 mg) by mouth daily Take one tablet daily SEE PROVIDER FOR NEXT REFILL 100 tablet 5     B-D U/F insulin pen needle USE TO INJECT LANTUS TWICE DAILY AND NOVOLOG THREE TIMES DAILY AS DIRECTED 500 each 0     blood glucose (NO BRAND SPECIFIED) lancets standard Use to test blood sugar 3 times daily or as directed. 300 each 3     blood glucose monitoring (NO BRAND SPECIFIED) meter device kit Use to test blood sugar 3 times daily or as directed. 1 kit 0     blood glucose monitoring (NO BRAND SPECIFIED) test strip Use to test blood sugars 3 times daily or as directed 300 strip 3     calcium carbonate (OS-NARA) 500 MG tablet Take 1 tablet by mouth 2 times daily       Cholecalciferol (VITAMIN D3 PO) Take 1,000 Units by mouth daily        Co-Enzyme Q10 100 MG CAPS Take 100 mg by mouth daily        collagenase (SANTYL) 250 UNIT/GM external ointment Apply topically daily 30 g 1     Continuous Blood Gluc Sensor (FREESTYLE LUCERO 14 DAY SENSOR) [facility] USE ONE EVERY 14 DAYS 6 each 1     furosemide (LASIX) 20 MG tablet Take 1 tablet (20 mg) by mouth daily as needed (for leg swelling) As needed for edema 30 tablet 3     gabapentin (NEURONTIN) 100 MG capsule Take 300 mg by mouth 3 times daily       hydrochlorothiazide (MICROZIDE) 12.5 MG capsule Take 1 capsule (12.5 mg) by mouth every morning 100 capsule 5     insulin aspart (NOVOLOG PEN) 100 UNIT/ML pen Use daily with meals, up to 70 units a day 30 mL 11     insulin degludec (TRESIBA FLEXTOUCH) 100 UNIT/ML pen 45-55 units subcutaneous daily 60 mL 11     ketoconazole (NIZORAL) 2 % external shampoo APPLY TOPICALLY DAILY AS NEEDED FOR ITCHING OR IRRITATION. SEE MD AFTER 6 WEEKS. 120 mL 1     lisinopril (ZESTRIL) 40 MG tablet Take 1 tablet (40 mg) by  mouth daily 100 tablet 5     MAGNESIUM GLYCINATE PLUS PO Take 400 mg by mouth 2 times daily       metFORMIN (GLUCOPHAGE-XR) 500 MG 24 hr tablet TAKE 2 TABLETS BY MOUTH TWICE DAILY WITH MEALS 360 tablet 1     Multiple Vitamins-Minerals (MULTIVITAMIN PO) Take 1 tablet by mouth daily        mupirocin (BACTROBAN) 2 % external ointment Apply a small amount to affected nares 2 times a day for one month. 30 g 1     Omega-3 Fatty Acids (OMEGA-3 FISH OIL PO) Take 1 g by mouth 2 times daily (with meals)        omeprazole (PRILOSEC) 40 MG DR capsule        potassium chloride ER (KLOR-CON M) 10 MEQ CR tablet Take 1 tablet (10 mEq) by mouth 2 times daily 5 tablet 3     primidone (MYSOLINE) 50 MG tablet TAKE 1 TABLET BY MOUTH THREE TIMES DAILY       semaglutide (OZEMPIC, 1 MG/DOSE,) 2 MG/1.5ML pen Inject 1 mg Subcutaneous every 7 days , on Wednesdays 6 mL 5     silver sulfADIAZINE (SILVADENE) 1 % external cream Apply topically daily 50 g 1     tadalafil (CIALIS) 5 MG tablet TAKE 1 TABLET BY MOUTH EVERY DAY AS NEEDED one hour before intercourse 30 tablet 1     Glucagon, rDNA, (GLUCAGON EMERGENCY) 1 MG KIT Inject 1 mg into the muscle once for 1 dose 1 kit 0       Allergies:     Allergies   Allergen Reactions     Bactrim [Sulfamethoxazole W/Trimethoprim] Nausea and Vomiting     Sulfa Drugs Nausea     Niacin Swelling     SIMCOR caused edema in extremities     Simvastatin Swelling     SIMCOR caused edema in extremities       Social History:  Home situation: lives in [place]  Occupation/Schooling: many jobs, [employer]  Tobacco use: denies  Drug use: denies  Alcohol use: occasional  History of chemical dependency treatment: denies  Mental health admissions: denies    Family history:  Family History   Problem Relation Age of Onset     Arthritis Mother         SLE     Connective Tissue Disorder Mother         lupus     Diabetes Mother      Cerebrovascular Disease Mother      Cancer Mother      Diabetes Father      Diabetes  Maternal Grandfather      Diabetes Sister        Review of Systems:    POSTIVE IN BOLD  GENERAL: fever/chills, fatigue, general unwell feeling, weight gain/loss.  HEAD/EYES:  headache, dizziness, or vision changes.    EARS/NOSE/THROAT:  Nosebleeds, hearing loss, sinus infection, earache, tinnitus.  IMMUNE:  Allergies, cancer, immune deficiency, or infections.  SKIN:  Urticaria, rash, hives  HEME/Lymphatic:   anemia, easy bruising, easy bleeding.  RESPIRATORY:  cough, wheezing, or shortness of breath  CARDIOVASCULAR/Circulation:  Extremity edema, syncope, hypertension, tachycardia, or angina.  GASTROINTESTINAL:  abdominal pain, nausea/emesis, diarrhea, constipation,  hematochezia, or melena.  ENDOCRINE:  Diabetes, steroid use,  thyroid disease or osteoporosis.  MUSCULOSKELETAL: neck pain, back pain, arthralgia, arthritis, or gout.  GENITOURINARY:  frequency, urgency, dysuria, difficulty voiding, hematuria or incontinence.  NEUROLOGIC:  weakness, numbness, paresthesias, seizure, tremor, stroke or memory loss.  PSYCHIATRIC:  depression, anxiety, stress, suicidal thoughts or mood swings.     Physical Exam:  Vitals:    03/03/22 1504   BP: 122/65     Exam:  Constitutional: Well developed, well nourished, appears stated age.  HEENT: Head atraumatic, normocephalic. Eyes without conjunctival injection or jaundice. Oropharynx clear.   Skin: No rash, lesions, or petechiae of exposed skin.   Extremities: Peripheral pulses intact. No clubbing, cyanosis, or edema.  Psychiatric/mental status: Alert, without lethargy or stupor. Speech fluent. Appropriate affect. Mood normal. Able to follow commands without difficulty.     Musculoskeletal exam:  Gait/Station/Posture:   Normal stance, arm swing, and stride; no antalgia or Trendelenburg  Normal bulk and tone.    Cervical spine:  Range of motion within normal limits   Myofascial tenderness: mild in the paraspinals. Left trapezius and rhomboid trigger point  No focal spinous process  tenderness.   Spurling's negative bilaterally.        Shoulder exam:   No shoulder girdle wasting or scapular dyskinesis. No palmar muscle wasting. No tenderness of bicipital groove, AC, GH, or SC joints. Shoulder range of motion within normal limits.         Neurologic exam:  CN:  Cranial nerves 2-12 are grossly intact  Motor Strength:  5/5 symmetric UE and LE strength  Reflexes:     Biceps C5:     R:  2/4 L: 2/4   Brachioradialis  C6: R:  2/4 L: 2/4   Triceps C7:  R:  2/4 L: 2/4        Sensory:  (upper and lower extremities):   Light touch: normal in the upper extremities      BILLING TIME DOCUMENTATION:   The total TIME spent on this patient on the date of the encounter/appointment was 40 minutes.      TOTAL TIME includes:   Time spent preparing to see the patient (reviewing records and tests)   Time spent face to face (or over the phone) with the patient  Time spent ordering tests, medications, procedures and referrals  Time spent Referring and communicating with other healthcare professionals   Time spent documenting clinical information in Epic     Mack Fowler DO  [facility]

## 2022-03-08 ENCOUNTER — HOSPITAL ENCOUNTER (OUTPATIENT)
Dept: ULTRASOUND IMAGING | Facility: CLINIC | Age: 62
Discharge: HOME | End: 2022-03-08
Attending: INTERNAL MEDICINE | Admitting: INTERNAL MEDICINE
Payer: COMMERCIAL

## 2022-03-08 DIAGNOSIS — R11.2 NON-INTRACTABLE VOMITING WITH NAUSEA, UNSPECIFIED VOMITING TYPE: ICD-10-CM

## 2022-03-08 DIAGNOSIS — K74.60 CIRRHOSIS OF LIVER WITHOUT ASCITES, UNSPECIFIED HEPATIC CIRRHOSIS TYPE (H): ICD-10-CM

## 2022-03-08 PROCEDURE — 76705 ECHO EXAM OF ABDOMEN: CPT

## 2022-03-10 ENCOUNTER — TELEPHONE (OUTPATIENT)
Dept: ENDOCRINOLOGY | Facility: CLINIC | Age: 62
End: 2022-03-10
Payer: COMMERCIAL

## 2022-03-10 DIAGNOSIS — Z79.4 TYPE 2 DIABETES MELLITUS WITH DIABETIC PERIPHERAL ANGIOPATHY AND GANGRENE, WITH LONG-TERM CURRENT USE OF INSULIN (H): ICD-10-CM

## 2022-03-10 DIAGNOSIS — E11.52 TYPE 2 DIABETES MELLITUS WITH DIABETIC PERIPHERAL ANGIOPATHY AND GANGRENE, WITH LONG-TERM CURRENT USE OF INSULIN (H): ICD-10-CM

## 2022-03-10 RX ORDER — INSULIN DEGLUDEC 100 U/ML
INJECTION, SOLUTION SUBCUTANEOUS
Qty: 60 ML | Refills: 1 | Status: ON HOLD | OUTPATIENT
Start: 2022-03-10 | End: 2022-08-01

## 2022-03-10 NOTE — TELEPHONE ENCOUNTER
[facility] Call Center    Phone Message    May a detailed message be left on voicemail: yes     Reason for Call: Medication Refill Request    Has the patient contacted the pharmacy for the refill? Yes   Name of medication being requested: insulin degludec (TRESIBA FLEXTOUCH) 100 UNIT/ML pen    Provider who prescribed the medication: [facility]    Pharmacy: [facility] [address] [place] [address]  Date medication is needed: ASAP- Pt is leaving for [place] this weekend and he will need while on vacation.      Action Taken: Other: endo    Travel Screening: Not Applicable

## 2022-03-18 NOTE — LETTER
9/9/2021         RE: Crispin Rojas  [address]  [facility] [address]        Dear Colleague,    Thank you for referring your patient, Crispin Rojas, to the [facility] PODIATRY. Please see a copy of my visit note below.    ASSESSMENT:  Encounter Diagnoses   Name Primary?     Diabetic ulcer of toe of right foot associated with type 2 diabetes mellitus, limited to breakdown of skin (H) Yes     Type 2 diabetes mellitus with peripheral neuropathy (H)      Hammer toes of both feet      MEDICAL DECISION MAKING:  Interval decrease in erythema and edema involving the right second toe.  Both wounds appear stable.  I do not think additional oral antibiotic is indicated at this time.    He will continue with daily cleansing.  I recommend he use either Santyl or Silvadene. We decided on Santyl.  We will continue with a gauze dressing.    Using a tissue nippers, debridement of the right hallux ulceration was performed.  This consisted of debridement of desquamated skin.  Debridement was no deeper than the skin level and involved an area of less than 20 cm .    Follow-up in 3 weeks.  He is to notify us if he notices increasing redness and swelling of the right second toe. If there is rapid increase, he is to present to the emergency department.    Disclaimer: This note consists of symbols derived from keyboarding, dictation and/or voice recognition software. As a result, there may be errors in the script that have gone undetected. Please consider this when interpreting information found in this chart.    Valentino Molina DPM, FACFAS, [facility] Department of Podiatry/Foot & Ankle Surgery      ____________________________________________________________________    HPI:         Chief Complaint: Follow-up for ulcerations on the right hallux and second toe  Onset of problem: 8/14/2021  No pain.  Peripheral neuropathy.  Type 2 diabetes.  I last evaluated him on 8/17/2021.  There was concern for  cellulitis and Bactrim DS was prescribed. He has finished this.  He is cleansing the wounds daily and using both Silvadene and Santyl ointment. He covers them with a light dressing.  He does not think his work boots are affecting these wounds.  He cannot wear the short cam walker at work.  History of toe amputations to treat osteomyelitis.  *  Past Medical History:   Diagnosis Date     Cerebral infarction (H)      Chronic infection      History of heartburn      Hyperlipidemia LDL goal <70      Hypertension      Numbness and tingling      Obesity      GILA (obstructive sleep apnea) 10/22/2018     Renal stone      Type II or unspecified type diabetes mellitus without mention of complication, not stated as uncontrolled    *  *  Past Surgical History:   Procedure Laterality Date     AMPUTATE FOOT Left 8/18/2016    Procedure: AMPUTATE FOOT;  Surgeon: Jack Younger DPM;  Location: [facility]     AMPUTATE TOE(S) Right 2/1/2016    Procedure: AMPUTATE TOE(S);  Surgeon: Rachelle Manriquez DPM, Pod;  Location: [facility] OR     AMPUTATE TOE(S) Right 8/2/2019    Procedure: Right fourth toe partial amputation for treatment of osteomyelitis.;  Surgeon: Jack Younger DPM;  Location: [facility] OR     AMPUTATE TOE(S) Left 11/8/2019    Procedure: Left second toe amputation at the metatarsophalangeal joint.;  Surgeon: Rachelle Manriquez DPM, Podiatry/Foot and Ankle Surgery;  Location:  OR     ANGIOGRAM       COLONOSCOPY       COMBINED CYSTOSCOPY, RETROGRADES, URETEROSCOPY, INSERT STENT Left 8/21/2016    Procedure: COMBINED CYSTOSCOPY, RETROGRADES, URETEROSCOPY, INSERT STENT;  Surgeon: Artemio Valenzuela MD;  Location:  OR     COMBINED CYSTOSCOPY, RETROGRADES, URETEROSCOPY, LASER HOLMIUM LITHOTRIPSY URETER(S), INSERT STENT Left 10/3/2016    Procedure: COMBINED CYSTOSCOPY, RETROGRADES, URETEROSCOPY, LASER HOLMIUM LITHOTRIPSY URETER(S), INSERT STENT;  Surgeon: Artemio Valenzuela MD;  Location:  OR     EXTRACORPOREAL SHOCK WAVE  LITHOTRIPSY (ESWL) Left 9/8/2016    Procedure: EXTRACORPOREAL SHOCK WAVE LITHOTRIPSY (ESWL);  Surgeon: Artemio Valenzuela MD;  Location: [facility] OR     RECESSION GASTROCNEMIUS Right 2/1/2016    Procedure: RECESSION GASTROCNEMIUS;  Surgeon: Rachelle Manriquez, DPM, Pod;  Location: [facility] OR   *  *  Current Outpatient Medications   Medication Sig Dispense Refill     amLODIPine (NORVASC) 5 MG tablet TAKE 1 TABLET(5 MG) BY MOUTH TWICE DAILY 180 tablet 1     aspirin (ASA) 325 MG tablet Take 325 mg by mouth daily       atorvastatin (LIPITOR) 40 MG tablet TAKE 1 TABLET BY MOUTH EVERY DAY 90 tablet 0     blood glucose (NO BRAND SPECIFIED) lancets standard Use to test blood sugar 3 times daily or as directed. 300 each 3     blood glucose monitoring (NO BRAND SPECIFIED) meter device kit Use to test blood sugar 3 times daily or as directed. 1 kit 0     blood glucose monitoring (NO BRAND SPECIFIED) test strip Use to test blood sugars 3 times daily or as directed 300 strip 3     Cholecalciferol (VITAMIN D3 PO) Take 1,000 Units by mouth daily        Co-Enzyme Q10 100 MG CAPS Take 100 mg by mouth daily        Continuous Blood Gluc Sensor (FREESTYLE LUCERO 2 SENSOR SYSTM) MISC 1 each every 14 days 6 each 3     gabapentin (NEURONTIN) 100 MG capsule Take 300 mg by mouth 3 times daily       Glucagon, rDNA, (GLUCAGON EMERGENCY) 1 MG KIT Inject 1 mg into the muscle once for 1 dose 1 kit 0     hydrochlorothiazide (MICROZIDE) 12.5 MG capsule Take 1 capsule (12.5 mg) by mouth every morning 90 capsule 1     insulin aspart (NOVOLOG PEN) 100 UNIT/ML pen 1 units per 9 grams of carbohydrate before each meal + 1:25 over 150 correction.  TDD 70 units 15 mL 3     insulin degludec (TRESIBA FLEXTOUCH) 100 UNIT/ML pen 45-55 units subcutaneous daily 60 mL 11     Insulin Pen Needle (PEN NEEDLES) 31G X 5 MM MISC 1 Device 4 times daily 200 each 10     ketoconazole (NIZORAL) 2 % external shampoo APPLY EXTERNALLY 2 TIMES A WEEK AS DIRECTED 120 mL 1     lisinopril  "(ZESTRIL) 40 MG tablet Take 1 tablet (40 mg) by mouth daily 90 tablet 3     MAGNESIUM GLYCINATE PLUS PO Take 400 mg by mouth 2 times daily       metFORMIN (GLUCOPHAGE-XR) 500 MG 24 hr tablet TAKE 2 TABLETS BY MOUTH TWICE DAILY WITH MEALS 360 tablet 3     Multiple Vitamins-Minerals (MULTIVITAMIN PO) Take 1 tablet by mouth daily        Omega-3 Fatty Acids (OMEGA-3 FISH OIL PO) Take 1 g by mouth 2 times daily (with meals)        potassium chloride ER (KLOR-CON M) 10 MEQ CR tablet Take 1 tablet (10 mEq) by mouth 2 times daily 5 tablet 3     semaglutide (OZEMPIC, 1 MG/DOSE,) 2 MG/1.5ML pen Inject 1 mg Subcutaneous every 7 days , on Wednesdays 6 mL 5     Semaglutide, 1 MG/DOSE, 4 MG/3ML SOPN Inject 1 mg Subcutaneous every 7 days on Wednesdays 3 mL 5     silver sulfADIAZINE (SILVADENE) 1 % external cream Apply topically daily 50 g 1     sulfamethoxazole-trimethoprim (BACTRIM DS) 800-160 MG tablet Take 1 tablet by mouth 2 times daily 20 tablet 0     tadalafil (CIALIS) 5 MG tablet TAKE 1 TABLET BY MOUTH EVERY DAY AS NEEDED one hour before intercourse 30 tablet 1         EXAM:    Vitals: /74   Ht 1.816 m (5' 11.5\")   Wt 107 kg (236 lb)   BMI 32.46 kg/m    BMI: Body mass index is 32.46 kg/m .    Constitutional:  Crispin Rojas is in no apparent distress, appears well-nourished.  Cooperative with history and physical exam.     Vascular:  Pedal pulses are palpable for both the DP and PT arteries.  CFT < 3 sec.  No edema.       Neuro: Light touch sensation is grossly diminished, bilateral foot, to the L4, L5, S1 distributions  No evidence of weakness, spasticity, or contracture in the lower extremities.      Derm:   1) Right hallux: ulceration to deeper skin, distal lateral. 0.6cm diameter. Hyperkeratotic eschar around the margins. The base is a mix of granular and fibrous tissue. This wound is to the level of deeper skin.    2) Right  2nd toe: ulceration dorsally. 0.2 cm L x 0.7cm W with depth to deeper skin. Light " surrounding erythema and mild edema of the toe. The base is granular.               Again, thank you for allowing me to participate in the care of your patient.        Sincerely,        Valentino Molina DPM     Quality 431: Preventive Care And Screening: Unhealthy Alcohol Use - Screening: Patient not identified as an unhealthy alcohol user when screened for unhealthy alcohol use using a systematic screening method Detail Level: Detailed Quality 226: Preventive Care And Screening: Tobacco Use: Screening And Cessation Intervention: Patient screened for tobacco use and is an ex/non-smoker

## 2022-03-24 ENCOUNTER — THERAPY VISIT (OUTPATIENT)
Dept: PHYSICAL THERAPY | Facility: CLINIC | Age: 62
End: 2022-03-24
Payer: COMMERCIAL

## 2022-03-24 DIAGNOSIS — M54.2 CERVICAL PAIN: ICD-10-CM

## 2022-03-24 DIAGNOSIS — M54.6 BILATERAL THORACIC BACK PAIN: ICD-10-CM

## 2022-03-24 PROCEDURE — 97035 APP MDLTY 1+ULTRASOUND EA 15: CPT | Mod: GP | Performed by: PHYSICAL THERAPIST

## 2022-03-24 PROCEDURE — 97012 MECHANICAL TRACTION THERAPY: CPT | Mod: GP | Performed by: PHYSICAL THERAPIST

## 2022-03-24 PROCEDURE — 97140 MANUAL THERAPY 1/> REGIONS: CPT | Mod: GP | Performed by: PHYSICAL THERAPIST

## 2022-03-28 ENCOUNTER — TRANSFERRED RECORDS (OUTPATIENT)
Dept: SURGERY | Facility: CLINIC | Age: 62
End: 2022-03-28
Payer: COMMERCIAL

## 2022-03-31 ENCOUNTER — ANCILLARY PROCEDURE (OUTPATIENT)
Dept: GENERAL RADIOLOGY | Facility: CLINIC | Age: 62
End: 2022-03-31
Attending: PODIATRIST
Payer: COMMERCIAL

## 2022-03-31 ENCOUNTER — OFFICE VISIT (OUTPATIENT)
Dept: PODIATRY | Facility: CLINIC | Age: 62
End: 2022-03-31
Payer: COMMERCIAL

## 2022-03-31 ENCOUNTER — THERAPY VISIT (OUTPATIENT)
Dept: PHYSICAL THERAPY | Facility: CLINIC | Age: 62
End: 2022-03-31
Payer: COMMERCIAL

## 2022-03-31 VITALS
BODY MASS INDEX: 35.36 KG/M2 | WEIGHT: 247 LBS | DIASTOLIC BLOOD PRESSURE: 70 MMHG | HEIGHT: 70 IN | SYSTOLIC BLOOD PRESSURE: 130 MMHG

## 2022-03-31 DIAGNOSIS — L97.512 ULCER OF TOE OF RIGHT FOOT, WITH FAT LAYER EXPOSED (H): Primary | ICD-10-CM

## 2022-03-31 DIAGNOSIS — M54.2 CERVICAL PAIN: ICD-10-CM

## 2022-03-31 DIAGNOSIS — E11.42 TYPE 2 DIABETES MELLITUS WITH PERIPHERAL NEUROPATHY (H): ICD-10-CM

## 2022-03-31 DIAGNOSIS — M54.6 BILATERAL THORACIC BACK PAIN: ICD-10-CM

## 2022-03-31 DIAGNOSIS — L97.512 ULCER OF TOE OF RIGHT FOOT, WITH FAT LAYER EXPOSED (H): ICD-10-CM

## 2022-03-31 DIAGNOSIS — R23.4 FISSURE IN SKIN OF FOOT: ICD-10-CM

## 2022-03-31 PROCEDURE — 73660 X-RAY EXAM OF TOE(S): CPT | Mod: RT | Performed by: RADIOLOGY

## 2022-03-31 PROCEDURE — 97012 MECHANICAL TRACTION THERAPY: CPT | Mod: GP | Performed by: PHYSICAL THERAPIST

## 2022-03-31 PROCEDURE — 99213 OFFICE O/P EST LOW 20 MIN: CPT | Mod: 25 | Performed by: PODIATRIST

## 2022-03-31 PROCEDURE — 11042 DBRDMT SUBQ TIS 1ST 20SQCM/<: CPT | Performed by: PODIATRIST

## 2022-03-31 PROCEDURE — 97140 MANUAL THERAPY 1/> REGIONS: CPT | Mod: GP | Performed by: PHYSICAL THERAPIST

## 2022-03-31 PROCEDURE — 97035 APP MDLTY 1+ULTRASOUND EA 15: CPT | Mod: GP | Performed by: PHYSICAL THERAPIST

## 2022-03-31 NOTE — PATIENT INSTRUCTIONS
Thank you for choosing [facility] / Foot & Ankle Surgery!    DR. CANDELARIA'S CLINIC LOCATIONS:     [facility] TRIAGE LINE: [phone] - [address]   [address] APPOINTMENTS: [phone]   [place] [address] RADIOLOGY: [phone]    SET UP SURGERY: [phone]    BILLING QUESTIONS: [phone]   [facility] FAX: [phone] [id number]    [place] [address]      Follow up: 3 weeks         WOUND CARE INSTRUCTIONS    1)  Keep the wound covered by a bandage when bathing.    2)  Gently clean the wound with soap water, separate from bath/shower water.      3)  Each day, apply a topical antibiotic ointment to the wound (Neosporin, Triple antibiotic, Bacitracin).   Cover with large band-aid or gauze.      5)  Please seek immediate medical attention if any increasing redness, drainage, smell, or pain related to the wound.     6)  Please return to clinic in the period of time requested by Dr. Candelaria.      SIGNS OF INFECTION    expanding redness around the wound     yellow or greenish-colored pus or cloudy wound drainage     red streaking spreading from the wound     increased swelling, tenderness, or pain around the wound     fever  *If you notice any of these signs of infection, call us right away!

## 2022-03-31 NOTE — LETTER
"    3/31/2022         RE: Crispin Rojas  [address] [facility] [address]        Dear Colleague,    Thank you for referring your patient, Crispin Rojas, to the [facility] PODIATRY. Please see a copy of my visit note below.    ASSESSMENT:  Encounter Diagnoses   Name Primary?     Ulcer of toe of right foot, with fat layer exposed (H) Yes     Fissure in skin of foot      Type 2 diabetes mellitus with peripheral neuropathy (H)      MEDICAL DECISION MAKING:  Excisional debridement of the right great toe wounds was performed.  Please see below.    He has hallux limitus and I explained how this contributes to increased pressure on this residual toe.    I recommend he consider offloading.  He will use his cam walker.    Wound Care Recommendations:    1)  Keep the wound covered by a bandage when bathing.    2)  Gently clean the wound with soap water, separate from bath/shower water.      3)  Each day, apply a topical antibiotic ointment to the wound (Neosporin, Triple antibiotic, Bacitracin).   Cover with large band-aid or gauze.  He will use Silvadene.    5)  Please seek immediate medical attention if any increasing redness, drainage, smell, or pain related to the wound.     6)  Please return to clinic in the period of time requested by Dr. Molina.      Excisional Debridement    The excisional debridement procedure was discussed.  This included the goals of removing non-viable tissue, evaluating the full extent of wound, and promoting wound healing.  Crispin Rojas  provided verba consent.  The \"Time Out\" was called, confirming procedure and location.     Using a sterile #15 blade and tissue nippers, excisional debridment of the left hallux ulcer was performed. The hyperkeratotic eschar surrounding the wound was removed, by excising skin edges back to healthy, bleeding tissue. Non-viable tissue was excised.  Debridement was carried out to the depth of the fat lyyer. The ulcer base was scraped " to remove bioburden and promote healing.  The area debrided was less than 20 square cm.   There was moderate bleeding with the procedure. No anesthesia was needed due to peripheral neuropathy.   A sterile dressing was applied.    Disclaimer: This note consists of symbols derived from keyboarding, dictation and/or voice recognition software. As a result, there may be errors in the script that have gone undetected. Please consider this when interpreting information found in this chart.    Valentino Molina, SHRUTHI, FACFAS, MS    [place] Department of Podiatry/Foot & Ankle Surgery      ____________________________________________________________________    HPI:     .     Pat presents today concerned about his residual right great toe.  He recently noted some bleeding.  This has occurred intermittently.  He reports it is a small amount of bleeding.  I previously treated him for a laceration to the plantar aspect of this toe.    type 2 diabetes  Peripheral neuropathy  History of toe amputations  Last Hgb A1C = 6.1%.      Past Medical History:   Diagnosis Date     Cerebral infarction (H)     2010     Chronic infection      Hyperlipidemia LDL goal <70      Hypertension      Numbness and tingling      Obesity      GILA (obstructive sleep apnea) 10/22/2018    CPAP intolerant     PVD (peripheral vascular disease) (H)     s/p left partial toe amputation     Renal stone      Tremor      Type 2 diabetes mellitus with diabetic polyneuropathy, with long-term current use of insulin (H)    *  *  Past Surgical History:   Procedure Laterality Date     AMPUTATE FOOT Left 8/18/2016    Procedure: AMPUTATE FOOT;  Surgeon: Jack Younger DPM;  Location: [facility] OR     AMPUTATE TOE(S) Right 2/1/2016    Procedure: AMPUTATE TOE(S);  Surgeon: Rachelle Manriquez DPM, Pod;  Location: [facility] OR     AMPUTATE TOE(S) Right 8/2/2019    Procedure: Right fourth toe partial amputation for treatment of osteomyelitis.;  Surgeon: Jack Younger DPM;  Location:   OR     AMPUTATE TOE(S) Left 11/8/2019    Procedure: Left second toe amputation at the metatarsophalangeal joint.;  Surgeon: Rachelle Manriquez DPM, Podiatry/Foot and Ankle Surgery;  Location: [facility] OR     ANGIOGRAM       COLONOSCOPY       COMBINED CYSTOSCOPY, RETROGRADES, URETEROSCOPY, INSERT STENT Left 8/21/2016    Procedure: COMBINED CYSTOSCOPY, RETROGRADES, URETEROSCOPY, INSERT STENT;  Surgeon: Artemio Valenzuela MD;  Location: [facility] OR     COMBINED CYSTOSCOPY, RETROGRADES, URETEROSCOPY, LASER HOLMIUM LITHOTRIPSY URETER(S), INSERT STENT Left 10/3/2016    Procedure: COMBINED CYSTOSCOPY, RETROGRADES, URETEROSCOPY, LASER HOLMIUM LITHOTRIPSY URETER(S), INSERT STENT;  Surgeon: Artemio Valenzuela MD;  Location: [facility] OR     EXTRACORPOREAL SHOCK WAVE LITHOTRIPSY (ESWL) Left 9/8/2016    Procedure: EXTRACORPOREAL SHOCK WAVE LITHOTRIPSY (ESWL);  Surgeon: Artemio Valenzuela MD;  Location: [facility] OR     RECESSION GASTROCNEMIUS Right 2/1/2016    Procedure: RECESSION GASTROCNEMIUS;  Surgeon: Rachelle Manriquez DPM, Pod;  Location: [facility] OR       EXAM:    Vitals: There were no vitals taken for this visit.  BMI: There is no height or weight on file to calculate BMI.    Vascular:  Pedal pulses are palpable for both the DP and PT arteries.  CFT < 3 sec.       Derm:   Right hallux:  1) ulceration plantar aspect of the residual right hallux.  Thick keratotic eschar around the margin.  Post debridement, 0.5 cm diameter by 2.5 cm depth.  It does not probe to bone.    2) proximal to the ulceration is thick hyperkeratotic tissue with a transverse fissure.  This measures up approximately 2 cm x 0.3cm     Musculoskeletal:   Partial amputation of the right hallux.  Digital contractures.The bilateral second toes are rigid.     Neuro: Light touch sensation is diminished to the L4, L5, S1 distributions  No evidence of weakness, spasticity, or contracture in the lower extremities.           Again, thank you for allowing me to participate in the care  of your patient.        Sincerely,        Valentino Molina, PRAFULM

## 2022-03-31 NOTE — PROGRESS NOTES
"ASSESSMENT:  Encounter Diagnoses   Name Primary?     Ulcer of toe of right foot, with fat layer exposed (H) Yes     Fissure in skin of foot      Type 2 diabetes mellitus with peripheral neuropathy (H)      MEDICAL DECISION MAKING:  Excisional debridement of the right great toe wounds was performed.  Please see below.    He has hallux limitus and I explained how this contributes to increased pressure on this residual toe.    I recommend he consider offloading.  He will use his cam walker.    Wound Care Recommendations:    1)  Keep the wound covered by a bandage when bathing.    2)  Gently clean the wound with soap water, separate from bath/shower water.      3)  Each day, apply a topical antibiotic ointment to the wound (Neosporin, Triple antibiotic, Bacitracin).   Cover with large band-aid or gauze.  He will use Silvadene.    5)  Please seek immediate medical attention if any increasing redness, drainage, smell, or pain related to the wound.     6)  Please return to clinic in the period of time requested by Dr. Molina.      Excisional Debridement    The excisional debridement procedure was discussed.  This included the goals of removing non-viable tissue, evaluating the full extent of wound, and promoting wound healing.  Crispin Rojas  provided verba consent.  The \"Time Out\" was called, confirming procedure and location.     Using a sterile #15 blade and tissue nippers, excisional debridment of the left hallux ulcer was performed. The hyperkeratotic eschar surrounding the wound was removed, by excising skin edges back to healthy, bleeding tissue. Non-viable tissue was excised.  Debridement was carried out to the depth of the fat lyyer. The ulcer base was scraped to remove bioburden and promote healing.  The area debrided was less than 20 square cm.   There was moderate bleeding with the procedure. No anesthesia was needed due to peripheral neuropathy.   A sterile dressing was applied.    Disclaimer: This note " consists of symbols derived from keyboarding, dictation and/or voice recognition software. As a result, there may be errors in the script that have gone undetected. Please consider this when interpreting information found in this chart.    Valentino Molina DPM, CLYDE, MS    [place] Department of Podiatry/Foot & Ankle Surgery      ____________________________________________________________________    HPI:     .     Pat presents today concerned about his residual right great toe.  He recently noted some bleeding.  This has occurred intermittently.  He reports it is a small amount of bleeding.  I previously treated him for a laceration to the plantar aspect of this toe.    type 2 diabetes  Peripheral neuropathy  History of toe amputations  Last Hgb A1C = 6.1%.      Past Medical History:   Diagnosis Date     Cerebral infarction (H)     2010     Chronic infection      Hyperlipidemia LDL goal <70      Hypertension      Numbness and tingling      Obesity      GILA (obstructive sleep apnea) 10/22/2018    CPAP intolerant     PVD (peripheral vascular disease) (H)     s/p left partial toe amputation     Renal stone      Tremor      Type 2 diabetes mellitus with diabetic polyneuropathy, with long-term current use of insulin (H)    *  *  Past Surgical History:   Procedure Laterality Date     AMPUTATE FOOT Left 8/18/2016    Procedure: AMPUTATE FOOT;  Surgeon: Jack Younger DPM;  Location: [facility] OR     AMPUTATE TOE(S) Right 2/1/2016    Procedure: AMPUTATE TOE(S);  Surgeon: Rachelle Manriquez DPM, Pod;  Location: [facility] OR     AMPUTATE TOE(S) Right 8/2/2019    Procedure: Right fourth toe partial amputation for treatment of osteomyelitis.;  Surgeon: Jack Younger DPM;  Location: [facility] OR     AMPUTATE TOE(S) Left 11/8/2019    Procedure: Left second toe amputation at the metatarsophalangeal joint.;  Surgeon: Rachelle Manriquez DPM, Podiatry/Foot and Ankle Surgery;  Location: [facility] OR     ANGIOGRAM       COLONOSCOPY       COMBINED  CYSTOSCOPY, RETROGRADES, URETEROSCOPY, INSERT STENT Left 8/21/2016    Procedure: COMBINED CYSTOSCOPY, RETROGRADES, URETEROSCOPY, INSERT STENT;  Surgeon: Artemio Valenzuela MD;  Location: [facility] OR     COMBINED CYSTOSCOPY, RETROGRADES, URETEROSCOPY, LASER HOLMIUM LITHOTRIPSY URETER(S), INSERT STENT Left 10/3/2016    Procedure: COMBINED CYSTOSCOPY, RETROGRADES, URETEROSCOPY, LASER HOLMIUM LITHOTRIPSY URETER(S), INSERT STENT;  Surgeon: Artemio Valenzuela MD;  Location: [facility] OR     EXTRACORPOREAL SHOCK WAVE LITHOTRIPSY (ESWL) Left 9/8/2016    Procedure: EXTRACORPOREAL SHOCK WAVE LITHOTRIPSY (ESWL);  Surgeon: Artemio Valenzuela MD;  Location: [facility] OR     RECESSION GASTROCNEMIUS Right 2/1/2016    Procedure: RECESSION GASTROCNEMIUS;  Surgeon: Rachelle Manriquez, DPM, Pod;  Location: [facility] OR       EXAM:    Vitals: There were no vitals taken for this visit.  BMI: There is no height or weight on file to calculate BMI.    Vascular:  Pedal pulses are palpable for both the DP and PT arteries.  CFT < 3 sec.       Derm:   Right hallux:  1) ulceration plantar aspect of the residual right hallux.  Thick keratotic eschar around the margin.  Post debridement, 0.5 cm diameter by 2.5 cm depth.  It does not probe to bone.    2) proximal to the ulceration is thick hyperkeratotic tissue with a transverse fissure.  This measures up approximately 2 cm x 0.3cm     Musculoskeletal:   Partial amputation of the right hallux.  Digital contractures.The bilateral second toes are rigid.     Neuro: Light touch sensation is diminished to the L4, L5, S1 distributions  No evidence of weakness, spasticity, or contracture in the lower extremities.

## 2022-04-05 DIAGNOSIS — Z79.4 TYPE 2 DIABETES MELLITUS WITH DIABETIC PERIPHERAL ANGIOPATHY AND GANGRENE, WITH LONG-TERM CURRENT USE OF INSULIN (H): ICD-10-CM

## 2022-04-05 DIAGNOSIS — E11.49 DIABETES MELLITUS TYPE 2 WITH NEUROLOGICAL MANIFESTATIONS (H): ICD-10-CM

## 2022-04-05 DIAGNOSIS — E11.52 TYPE 2 DIABETES MELLITUS WITH DIABETIC PERIPHERAL ANGIOPATHY AND GANGRENE, WITH LONG-TERM CURRENT USE OF INSULIN (H): ICD-10-CM

## 2022-04-06 ENCOUNTER — THERAPY VISIT (OUTPATIENT)
Dept: PHYSICAL THERAPY | Facility: CLINIC | Age: 62
End: 2022-04-06
Payer: COMMERCIAL

## 2022-04-06 DIAGNOSIS — M54.2 CERVICAL PAIN: ICD-10-CM

## 2022-04-06 DIAGNOSIS — M54.6 BILATERAL THORACIC BACK PAIN: ICD-10-CM

## 2022-04-06 PROCEDURE — 97140 MANUAL THERAPY 1/> REGIONS: CPT | Mod: GP | Performed by: PHYSICAL THERAPIST

## 2022-04-06 PROCEDURE — 97035 APP MDLTY 1+ULTRASOUND EA 15: CPT | Mod: GP | Performed by: PHYSICAL THERAPIST

## 2022-04-06 PROCEDURE — 97012 MECHANICAL TRACTION THERAPY: CPT | Mod: GP | Performed by: PHYSICAL THERAPIST

## 2022-04-06 RX ORDER — INSULIN LISPRO 100 [IU]/ML
INJECTION, SOLUTION INTRAVENOUS; SUBCUTANEOUS
Qty: 30 ML | Refills: 11 | OUTPATIENT
Start: 2022-04-06

## 2022-04-09 ENCOUNTER — HEALTH MAINTENANCE LETTER (OUTPATIENT)
Age: 62
End: 2022-04-09

## 2022-04-14 ENCOUNTER — THERAPY VISIT (OUTPATIENT)
Dept: PHYSICAL THERAPY | Facility: CLINIC | Age: 62
End: 2022-04-14
Payer: COMMERCIAL

## 2022-04-14 DIAGNOSIS — M54.2 CERVICAL PAIN: Primary | ICD-10-CM

## 2022-04-14 DIAGNOSIS — M54.6 BILATERAL THORACIC BACK PAIN: ICD-10-CM

## 2022-04-14 PROCEDURE — 97035 APP MDLTY 1+ULTRASOUND EA 15: CPT | Mod: GP | Performed by: PHYSICAL THERAPIST

## 2022-04-14 PROCEDURE — 97140 MANUAL THERAPY 1/> REGIONS: CPT | Mod: GP | Performed by: PHYSICAL THERAPIST

## 2022-04-14 PROCEDURE — 97012 MECHANICAL TRACTION THERAPY: CPT | Mod: GP | Performed by: PHYSICAL THERAPIST

## 2022-04-14 PROCEDURE — 97110 THERAPEUTIC EXERCISES: CPT | Mod: GP | Performed by: PHYSICAL THERAPIST

## 2022-05-10 ENCOUNTER — TELEPHONE (OUTPATIENT)
Dept: PODIATRY | Facility: CLINIC | Age: 62
End: 2022-05-10

## 2022-05-10 ENCOUNTER — OFFICE VISIT (OUTPATIENT)
Dept: PODIATRY | Facility: CLINIC | Age: 62
End: 2022-05-10
Payer: COMMERCIAL

## 2022-05-10 VITALS
WEIGHT: 247 LBS | BODY MASS INDEX: 35.36 KG/M2 | DIASTOLIC BLOOD PRESSURE: 62 MMHG | HEIGHT: 70 IN | SYSTOLIC BLOOD PRESSURE: 136 MMHG

## 2022-05-10 DIAGNOSIS — E11.621 DIABETIC ULCER OF OTHER PART OF RIGHT FOOT ASSOCIATED WITH TYPE 2 DIABETES MELLITUS, WITH FAT LAYER EXPOSED (H): ICD-10-CM

## 2022-05-10 DIAGNOSIS — E11.42 DIABETIC POLYNEUROPATHY ASSOCIATED WITH TYPE 2 DIABETES MELLITUS (H): Primary | ICD-10-CM

## 2022-05-10 DIAGNOSIS — Z89.411 HISTORY OF PARTIAL RAY AMPUTATION OF RIGHT GREAT TOE (H): ICD-10-CM

## 2022-05-10 DIAGNOSIS — S98.112A AMPUTATION OF LEFT GREAT TOE (H): ICD-10-CM

## 2022-05-10 DIAGNOSIS — L97.512 DIABETIC ULCER OF OTHER PART OF RIGHT FOOT ASSOCIATED WITH TYPE 2 DIABETES MELLITUS, WITH FAT LAYER EXPOSED (H): ICD-10-CM

## 2022-05-10 DIAGNOSIS — I10 BENIGN ESSENTIAL HYPERTENSION: ICD-10-CM

## 2022-05-10 PROCEDURE — 99214 OFFICE O/P EST MOD 30 MIN: CPT | Mod: 25 | Performed by: PODIATRIST

## 2022-05-10 PROCEDURE — 11042 DBRDMT SUBQ TIS 1ST 20SQCM/<: CPT | Performed by: PODIATRIST

## 2022-05-10 RX ORDER — DOXYCYCLINE HYCLATE 100 MG
100 TABLET ORAL 2 TIMES DAILY
Qty: 28 TABLET | Refills: 0 | Status: SHIPPED | OUTPATIENT
Start: 2022-05-10 | End: 2022-05-24

## 2022-05-10 RX ORDER — HYDROCHLOROTHIAZIDE 12.5 MG/1
12.5 CAPSULE ORAL EVERY MORNING
Qty: 90 CAPSULE | Refills: 2 | Status: SHIPPED | OUTPATIENT
Start: 2022-05-10 | End: 2023-01-11

## 2022-05-10 NOTE — PROGRESS NOTES
"Podiatry / Foot and Ankle Surgery Progress Note    May 10, 2022    Subject: Patient was seen for worsening right great toe ulceration.  He has had this for quite a few months.  He has been using Santyl and then ran out of it.  Notes that he did not have any infection when he was on Santyl.  Normally sees Dr. Gil for this.  States that he has been on his feet a lot working.  He has 2 jobs where he is a  for both.  He has not been wearing his offloading boot.  Notes pain to the foot which is 5 out of 10 but normally has baseline neuropathy due to his diabetes.  Denies fever, nausea, vomiting.    Objective:  Vitals: /62   Ht 1.778 m (5' 10\")   Wt 112 kg (247 lb)   BMI 35.44 kg/m    BMI= Body mass index is 35.44 kg/m .    A1C: 6.1 (1/2022)    General:  Patient is alert and orientated.  NAD.    Vascular:  DP and PT pulses are palpable.  No edema or varicosities noted.  CFT's < 3secs.  Skin temp is normal.    Neuro:  Light and gross touch sensation  Diminished to feet.     Derm: Full-thickness ulceration to the plantar aspect of the remaining right great toe.  This measures approximately 1.9 cm x 1.8 cm x 0.3 cm.  Localized redness but no purulent drainage is noted.  Copious heavy amounts of clear serous drainage is noted with significant maceration around the wound edges.    Musculoskeletal: Partial right great toe amputation.  Previous left complete great toe amputation.    Imaging: Right foot x-ray  -I personally reviewed the xrays.  Compared to x-rays on 3/31/2022 there does not appear to be any change in the bone or signs of acute bone infection.  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  Please see procedure note below.  Had a long discussion with patient that this is a pressure area and we need to get the pressure off.  Offered a DH 2 shoe or boot but patient declined.  States that he cannot be off of work and he needs to drive.  He has a boot at home he notes that he tries to wear it is much as possible.  Discussed I recommend that he wear it all the time to get the pressure off.  Discussed this if the bone gets infected underneath and that is further surgery to remove more of the toe.  We will start him on doxycycline twice a day for 14 days.  He was given an order for Santyl however if he is not able to get this then he will call and we will put in an order for Iodosorb gel.  We will get baseline x-rays on the way out of clinic.  We also talked about if he \"wear his offloading boot then I recommend at least getting an over-the-counter insert and cutting it out under the ulcer to help decrease pressure to the area.  We will have him follow-up with Dr. Gil in 1 week for reassessment.  We discussed that if he develops fever, nausea, vomiting that he needs to go to the hospital.  They recommend MRI at that time if wound is not improving as x-rays do not show any bone infection.  All questions were answered to patient satisfaction he will call for the questions or concerns.      Procedure: After verbal consent, excisional debridement was performed on ulcer.  #15 blade was used to debride ulcer down to and including subcutaneous tissue. Bleeding controlled with light pressure.   No drainage noted.  No anesthesia was used due to neuropathy. Dry dressing applied to foot.  Patient tolerated procedure well.      Patient Risk Factor:  Patient is a high risk factor for infection.     Rachelle Manriquez DPM, Podiatry/Foot and Ankle Surgery        "

## 2022-05-10 NOTE — TELEPHONE ENCOUNTER
Phone call to [facility], to confirm they still kept patient's prescription for Santyl as he wants to purchase out of pocket. She states it is $332.   Let her know patient will be contacting them for cost and wanted to pay out of pocket. She verbalized understanding.     AMMON Ceron RN

## 2022-05-10 NOTE — TELEPHONE ENCOUNTER
"Received faxed PA request for Santyl 250unit/gm ointment dated 5/4/22 from [facility].   It states patient's insurance does not cover this medication.     Discussed with Dr. Manriquez, who saw patient today. She states patient told her he wasn't able to get it from the pharmacy but did not say why.   She recommends Iodosorb gel from [facility] if patient would like to try that instead.     Phone call to [facility] and they state the Santl was denied as not covered again.   Will discuss with patient.     Phone call to patient and let him know his insurance does not cover the Santly. He states he knew that but wants to get it anyway. He feels it has helped prevent infection for him. He would rather pay out of pocket than \"lose a toe\".   He was informed he may contact the pharmacy to find out the cost and pay out of pocket.   He also asked if the xray results were back. Let him know they are not as of yet.   He verbalized understanding.     AMMON Ceron RN          "

## 2022-05-10 NOTE — PATIENT INSTRUCTIONS
Thank you for choosing [facility] Podiatry / Foot & Ankle Surgery!    DR GARCIA'S CLINIC:  [facility]   [address]   [place] [address]      TRIAGE LINE: [phone]  APPOINTMENTS: [phone]  RADIOLOGY: [phone]  SET UP SURGERY: [phone]  FAX NUMBER: [phone]  BILLING QUESTIONS: [phone]       Follow up: 1 week with Dr. Molina.     FOOT ULCER (WOUND) EDUCATION  Ulceration ofthe foot involves a break or hole in the skin. Skin is our best protection against infection. Skin is quite durable, however, the underlying tissues are fragile. For this reason, the wound is likely to deepen rapidly. Deep wounds usually get infected and require amputation. Prompt healing is therefore essential to avoid limb loss.     Foot ulcers do not heal without intervention. Walking on the foot and living your normal life is not typically compatible with healing the sore. Successful healing will require several months of significant alteration of your daily activities.   Ulcer complications frequently develop. This primarily includes infection of skin, which then spreads deep into your joints, bones and tendons. Spreading infection may travel up your leg and into other parts of your body. Deep infection is usually treated with amputation ofpart ofyour foot or your leg. Signs of infection include fever, chills, nausea, vomiting, erratic blood sugars, local redness, pus, strong odor and localized warmth. Signs of infection should be taken seriously. Prompt evaluation in the clinic or hospital emergency room is required.   Ulcer treatment requires debridement or surgical removal of devitalized tissue. Your doctor will trim away callused, moistened, unhealthy tissue from the wound surface and margin. This helps to clean the wound and allows proper inspection. Debridement also stimulates healing even though the wound originally appears larger. Expect some bleeding with each debridement. You  will be given instruction regarding wound bandaging. This often includes ointment and gauze. Avoid tape directly on the skin. Hand washing is essential since most infections will come from your fingertips. Ulcer care requires a no touch technique. Your fingers should not touch the margin or base of the wound.    HELPFUL HEALING TIPS:  1. Debridement: Getting rid of bad tissue makes way for good tissue to promote healing  2. Addressing Foot Deformities: Hammertoes and bunions can cause increased pressure  3. Pressure Reduction: If pressure remains to the wound, it won't heal  4. Good Pulses: If bloodflow is not getting to the foot, the ulcer will not heal  5. Good Nutrition: If you are not getting proper nutrition your body can't heal.Protein! At least 90g a day.  Supplements are a good way to help get this, such as Neymar, Glucerna, Ensure. Also taking 5000units of Vitamin D a day.   6. Infection Control: Keep the ulcer clean with wound cleanser. DO NOT SOAK IT!  7. Moisture Control: Keep edema down and make sure that drainage is getting pulled away from the ulcer    IMPORTANCE OF DEBRIDEMENT   Reduces bioburden to help control or reduce infection. Even if an ulcer is not  infected,  the bacterial bioburden causes increased local inflammation.   Allows more accurate visualization of the wound base and edges, which allows for more precise staging.   Removes necrotic/non-viable tissue, which impedes wound healing, causes protein loss and can be a nidus for infection.   Stimulates new circulation (angiogenesis) and allows adequate oxygen delivery to the wound.   Removes undermining and tunneling, and may help reduce abscess formation.   Releases healing growth factors at the edge of the wound.   Prepares the wound bed by leaving only tissues that are capable of regenerating.

## 2022-05-10 NOTE — LETTER
"    5/10/2022         RE: Crispin Rojas  [address] [facility] [address]        Dear Colleague,    Thank you for referring your patient, Crispin Rojas, to the [facility] PODI[facility]RY. Please see a copy of my visit note below.    Podiatry / Foot and Ankle Surgery Progress Note    May 10, 2022    Subject: Patient was seen for worsening right great toe ulceration.  He has had this for quite a few months.  He has been using Santyl and then ran out of it.  Notes that he did not have any infection when he was on Santyl.  Normally sees Dr. Gil for this.  States that he has been on his feet a lot working.  He has 2 jobs where he is a  for both.  He has not been wearing his offloading boot.  Notes pain to the foot which is 5 out of 10 but normally has baseline neuropathy due to his diabetes.  Denies fever, nausea, vomiting.    Objective:  Vitals: /62   Ht 1.778 m (5' 10\")   Wt 112 kg (247 lb)   BMI 35.44 kg/m    BMI= Body mass index is 35.44 kg/m .    A1C: 6.1 (1/2022)    General:  Patient is alert and orientated.  NAD.    Vascular:  DP and PT pulses are palpable.  No edema or varicosities noted.  CFT's < 3secs.  Skin temp is normal.    Neuro:  Light and gross touch sensation  Diminished to feet.     Derm: Full-thickness ulceration to the plantar aspect of the remaining right great toe.  This measures approximately 1.9 cm x 1.8 cm x 0.3 cm.  Localized redness but no purulent drainage is noted.  Copious heavy amounts of clear serous drainage is noted with significant maceration around the wound edges.    Musculoskeletal: Partial right great toe amputation.  Previous left complete great toe amputation.    Imaging: Right foot x-ray  -I personally reviewed the xrays.  Compared to x-rays on 3/31/2022 there does not appear to be any change in the bone or signs of acute bone infection.  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  Please see procedure note below.  Had a long discussion with patient that this is a pressure area and we need to get the pressure off.  Offered a DH 2 shoe or boot but patient declined.  States that he cannot be off of work and he needs to drive.  He has a boot at home he notes that he tries to wear it is much as possible.  Discussed I recommend that he wear it all the time to get the pressure off.  Discussed this if the bone gets infected underneath and that is further surgery to remove more of the toe.  We will start him on doxycycline twice a day for 14 days.  He was given an order for Santyl however if he is not able to get this then he will call and we will put in an order for Iodosorb gel.  We will get baseline x-rays on the way out of clinic.  We also talked about if he \"wear his offloading boot then I recommend at least getting an over-the-counter insert and cutting it out under the ulcer to help decrease pressure to the area.  We will have him follow-up with Dr. Gil in 1 week for reassessment.  We discussed that if he develops fever, nausea, vomiting that he needs to go to the hospital.  They recommend MRI at that time if wound is not improving as x-rays do not show any bone infection.  All questions were answered to patient satisfaction he will call for the questions or concerns.      Procedure: After verbal consent, excisional debridement was performed on ulcer.  #15 blade was used to debride ulcer down to and including subcutaneous tissue. Bleeding controlled with light pressure.   No drainage noted.  No anesthesia was used due to neuropathy. Dry dressing applied to foot.  Patient tolerated procedure well.      Patient " Risk Factor:  Patient is a high risk factor for infection.     Rachelle Manriquez DPM, Podiatry/Foot and Ankle Surgery            Again, thank you for allowing me to participate in the care of your patient.        Sincerely,        Rachelle Manriquez DPM, Podiatry/Foot and Ankle Surgery

## 2022-05-24 DIAGNOSIS — E78.5 HYPERLIPIDEMIA LDL GOAL <100: Primary | ICD-10-CM

## 2022-05-24 DIAGNOSIS — M79.89 SWELLING OF BOTH LOWER EXTREMITIES: ICD-10-CM

## 2022-05-24 NOTE — TELEPHONE ENCOUNTER
Patient left message on SB% line at [facility]    States he has requested multiple times for refills on Potassium, and Atorvastatin.    Please send asap.    Thank you  Vasyl SÁNCHEZ   - Complex Care Team

## 2022-05-25 RX ORDER — POTASSIUM CHLORIDE 750 MG/1
10 TABLET, EXTENDED RELEASE ORAL 2 TIMES DAILY
Qty: 180 TABLET | Refills: 1 | Status: SHIPPED | OUTPATIENT
Start: 2022-05-25 | End: 2023-01-11

## 2022-05-25 RX ORDER — ATORVASTATIN CALCIUM 40 MG/1
40 TABLET, FILM COATED ORAL DAILY
Qty: 90 TABLET | Refills: 1 | Status: SHIPPED | OUTPATIENT
Start: 2022-05-25 | End: 2023-01-09

## 2022-05-25 NOTE — TELEPHONE ENCOUNTER
Routing refill request to provider for review/approval because:  A break in medication  Medication is reported/historical    Per chart review pt was to schedule an appt prior to additional refills, pt is scheduled with PCP on 7/6/22    Stacy BURT RN

## 2022-05-27 ENCOUNTER — OFFICE VISIT (OUTPATIENT)
Dept: PODIATRY | Facility: CLINIC | Age: 62
End: 2022-05-27
Payer: COMMERCIAL

## 2022-05-27 VITALS — DIASTOLIC BLOOD PRESSURE: 68 MMHG | SYSTOLIC BLOOD PRESSURE: 122 MMHG

## 2022-05-27 DIAGNOSIS — E11.621 DIABETIC ULCER OF OTHER PART OF RIGHT FOOT ASSOCIATED WITH TYPE 2 DIABETES MELLITUS, WITH FAT LAYER EXPOSED (H): ICD-10-CM

## 2022-05-27 DIAGNOSIS — L97.512 DIABETIC ULCER OF OTHER PART OF RIGHT FOOT ASSOCIATED WITH TYPE 2 DIABETES MELLITUS, WITH FAT LAYER EXPOSED (H): ICD-10-CM

## 2022-05-27 DIAGNOSIS — Z89.429 STATUS POST AMPUTATION OF TOE (H): ICD-10-CM

## 2022-05-27 DIAGNOSIS — E11.42 TYPE 2 DIABETES MELLITUS WITH PERIPHERAL NEUROPATHY (H): Primary | ICD-10-CM

## 2022-05-27 PROCEDURE — 99213 OFFICE O/P EST LOW 20 MIN: CPT | Mod: 25 | Performed by: PODIATRIST

## 2022-05-27 PROCEDURE — 11042 DBRDMT SUBQ TIS 1ST 20SQCM/<: CPT | Performed by: PODIATRIST

## 2022-05-27 NOTE — PATIENT INSTRUCTIONS
Thank you for choosing [facility] Podiatry / Foot & Ankle Surgery!    DR. CANDELARIA'S CLINIC LOCATIONS:     [facility] TRIAGE LINE: [phone]   [address] APPOINTMENTS: [phone]   [place] [address] RADIOLOGY: [phone]    SET UP SURGERY: [phone]    BILLING QUESTIONS: [phone]   [facility] FAX: [phone] [id number]    [place] [address]

## 2022-05-27 NOTE — LETTER
"    5/27/2022         RE: Crispin Rojas  [address] [facility] [address]        Dear Colleague,    Thank you for referring your patient, Crispin Rojas, to the [facility]. Please see a copy of my visit note below.    ASSESSMENT:  Encounter Diagnoses   Name Primary?     Ulcer of great toe, left, with fat layer exposed (H) Yes     Type 2 diabetes mellitus with peripheral neuropathy (H)      Status post amputation of toe (H)      MEDICAL DECISION MAKING:  Although there are no clinical signs of infection, this wound probes deep and likely near or to bone.  Given this finding as well as the subtle changes on the recent x-ray, I highly recommend an MRI.  There is concern for osteomyelitis.    Judie is agreeable to the MRI.    I encouraged offloading of the toe.  He has offloading shoes and boots at home.  He states he cannot do this due to his current job.    Due to the reported drainage, I recommend he use Iodosorb, after lancing the wound.    If the MRI suggests osteomyelitis, then either bone biopsy or primary amputation of the residual right hallux will be discussed.    We discussed the risk of progressive infection.    Follow-up in 2 weeks    Excisional Debridement    The excisional debridement procedure was discussed.  This included the goals of removing non-viable tissue, evaluating the full extent of wound, and promoting wound healing.  Crispin Rojas  provided verba consent.  The \"Time Out\" was called, confirming procedure and location.     Using a sterile #15 blade and tissue nippers, excisional debridment of the right hallux ulcer was performed. The hyperkeratotic eschar surrounding the wound was removed, by excising skin edges back to healthy, bleeding tissue. Non-viable tissue was excised.  Debridement was carried out to the depth of the fat layer. The ulcer base was scraped to remove bioburden and promote healing.  The area debrided was less than 20 square cm.   There " was moderate bleeding with the procedure. No anesthesia was needed due to peripheral neuropathy.   A sterile dressing was applied.      Disclaimer: This note consists of symbols derived from keyboarding, dictation and/or voice recognition software. As a result, there may be errors in the script that have gone undetected. Please consider this when interpreting information found in this chart.    Valentino Molina DPM, FACFAS, MS    [facility] of Podiatry/Foot & Ankle Surgery      ____________________________________________________________________    HPI:       Crispin follows up for a right great toe ulceration.  He most recently saw Dr. Manriquez on 5/10/2022.  He was concerned about infection.  There was interval worsening of this wound.  The importance of offloading was stressed.  He was started on doxycycline. This is completed. He is applying Santyl.  He said he is not able to better offload due to his job.  He has not used his cam walker.  *  Past Medical History:   Diagnosis Date     Cerebral infarction (H)     2010     Chronic infection      Hyperlipidemia LDL goal <70      Hypertension      Numbness and tingling      Obesity      GILA (obstructive sleep apnea) 10/22/2018    CPAP intolerant     PVD (peripheral vascular disease) (H)     s/p left partial toe amputation     Renal stone      Tremor      Type 2 diabetes mellitus with diabetic polyneuropathy, with long-term current use of insulin (H)    *  *  Past Surgical History:   Procedure Laterality Date     AMPUTATE FOOT Left 8/18/2016    Procedure: AMPUTATE FOOT;  Surgeon: Jack Younger DPM;  Location: [facility]     AMPUTATE TOE(S) Right 2/1/2016    Procedure: AMPUTATE TOE(S);  Surgeon: Rachelle Manriquez DPM, Pod;  Location: [facility] OR     AMPUTATE TOE(S) Right 8/2/2019    Procedure: Right fourth toe partial amputation for treatment of osteomyelitis.;  Surgeon: Jack Younger DPM;  Location: [facility]     AMPUTATE TOE(S) Left 11/8/2019    Procedure: Left  second toe amputation at the metatarsophalangeal joint.;  Surgeon: Rachelle Manriquez DPM, Podiatry/Foot and Ankle Surgery;  Location: [facility] OR     ANGIOGRAM       COLONOSCOPY       COMBINED CYSTOSCOPY, RETROGRADES, URETEROSCOPY, INSERT STENT Left 8/21/2016    Procedure: COMBINED CYSTOSCOPY, RETROGRADES, URETEROSCOPY, INSERT STENT;  Surgeon: Artemio Valenzuela MD;  Location: [facility] OR     COMBINED CYSTOSCOPY, RETROGRADES, URETEROSCOPY, LASER HOLMIUM LITHOTRIPSY URETER(S), INSERT STENT Left 10/3/2016    Procedure: COMBINED CYSTOSCOPY, RETROGRADES, URETEROSCOPY, LASER HOLMIUM LITHOTRIPSY URETER(S), INSERT STENT;  Surgeon: Artemio Valenzuela MD;  [facility]: [facility]     EXTRACORPOREAL SHOCK WAVE LITHOTRIPSY (ESWL) Left 9/8/2016    Procedure: EXTRACORPOREAL SHOCK WAVE LITHOTRIPSY (ESWL);  Surgeon: Artemio Valenzuela MD;  Location: [facility] OR     RECESSION GASTROCNEMIUS Right 2/1/2016    Procedure: RECESSION GASTROCNEMIUS;  Surgeon: Rachelle Manriquez DPM, Pod;  Location: [facility] OR   *  *  Current Outpatient Medications   Medication Sig Dispense Refill     amLODIPine (NORVASC) 5 MG tablet Take 1 tablet (5 mg) by mouth 2 times daily Take one tablet twice daily  SEE MD THIS QUARTER 180 tablet 5     aspirin (ASA) 325 MG tablet Take 325 mg by mouth daily       atorvastatin (LIPITOR) 40 MG tablet Take 1 tablet (40 mg) by mouth daily Take one tablet daily SEE PROVIDER FOR NEXT REFILL 90 tablet 1     B-D U/F insulin pen needle USE TO INJECT LANTUS TWICE DAILY AND NOVOLOG THREE TIMES DAILY AS DIRECTED 500 each 0     blood glucose (NO BRAND SPECIFIED) lancets standard Use to test blood sugar 3 times daily or as directed. 300 each 3     blood glucose monitoring (NO BRAND SPECIFIED) meter device kit Use to test blood sugar 3 times daily or as directed. 1 kit 0     blood glucose monitoring (NO BRAND SPECIFIED) test strip Use to test blood sugars 3 times daily or as directed 300 strip 3     calcium carbonate (OS-NARA) 500 MG tablet Take 1 tablet  by mouth 2 times daily       Cholecalciferol (VITAMIN D3 PO) Take 1,000 Units by mouth daily        Co-Enzyme Q10 100 MG CAPS Take 100 mg by mouth daily        collagenase (SANTYL) 250 UNIT/GM external ointment Apply topically daily 30 g 1     collagenase (SANTYL) 250 UNIT/GM external ointment Apply topically daily 30 g 1     Continuous Blood Gluc Sensor (FREESTYLE LUCERO 14 DAY SENSOR) MISC USE ONE EVERY 14 DAYS 6 each 1     furosemide (LASIX) 20 MG tablet Take 1 tablet (20 mg) by mouth daily as needed (for leg swelling) As needed for edema 30 tablet 3     gabapentin (NEURONTIN) 100 MG capsule Take 300 mg by mouth 3 times daily       Glucagon, rDNA, (GLUCAGON EMERGENCY) 1 MG KIT Inject 1 mg into the muscle once for 1 dose 1 kit 0     hydrochlorothiazide (MICROZIDE) 12.5 MG capsule Take 1 capsule (12.5 mg) by mouth every morning 90 capsule 2     insulin aspart (NOVOLOG PEN) 100 UNIT/ML pen Use daily with meals, up to 70 units a day 30 mL 11     insulin degludec (TRESIBA FLEXTOUCH) 100 UNIT/ML pen 45-55 units subcutaneous daily 60 mL 1     ketoconazole (NIZORAL) 2 % external shampoo APPLY TOPICALLY DAILY AS NEEDED FOR ITCHING OR IRRITATION. SEE MD AFTER 6 WEEKS. 120 mL 1     lisinopril (ZESTRIL) 40 MG tablet Take 1 tablet (40 mg) by mouth daily 100 tablet 5     MAGNESIUM GLYCINATE PLUS PO Take 400 mg by mouth 2 times daily       metFORMIN (GLUCOPHAGE-XR) 500 MG 24 hr tablet TAKE 2 TABLETS BY MOUTH TWICE DAILY WITH MEALS 360 tablet 1     Multiple Vitamins-Minerals (MULTIVITAMIN PO) Take 1 tablet by mouth daily        mupirocin (BACTROBAN) 2 % external ointment Apply a small amount to affected nares 2 times a day for one month. 30 g 1     Omega-3 Fatty Acids (OMEGA-3 FISH OIL PO) Take 1 g by mouth 2 times daily (with meals)        omeprazole (PRILOSEC) 40 MG DR capsule        potassium chloride ER (KLOR-CON M) 10 MEQ CR tablet Take 1 tablet (10 mEq) by mouth 2 times daily 180 tablet 1     primidone (MYSOLINE) 50 MG  tablet TAKE 1 TABLET BY MOUTH THREE TIMES DAILY       semaglutide (OZEMPIC, 1 MG/DOSE,) 2 MG/1.5ML pen Inject 1 mg Subcutaneous every 7 days , on Wednesdays 6 mL 5     silver sulfADIAZINE (SILVADENE) 1 % external cream Apply topically daily 50 g 1     tadalafil (CIALIS) 5 MG tablet TAKE 1 TABLET BY MOUTH EVERY DAY AS NEEDED one hour before intercourse 30 tablet 1         EXAM:    Vitals: /68   BMI: There is no height or weight on file to calculate BMI.    Constitutional:  Crispin Rojas is in no apparent distress, appears well-nourished.  Cooperative with history and physical exam.    Vascular:  Pedal pulses are palpable for both the DP and PT arteries.  CFT < 3 sec.  No edema.      Neuro: Light touch sensation is intact to the L4, L5, S1 distributions  No evidence of weakness, spasticity, or contracture in the lower extremities.     Derm: 1.3 cm in diameter ulceration on the plantar aspect of the residual right hallux.  The base is granular, yet probes nearly a centimeter deep and likely to bone.  There is some macerated hyperkeratotic eschar around the margins.  No malodor.  No erythema.  No purulence.    RIGHT THREE OR MORE VIEWS May 10, 2022 2:34 PM      HISTORY: Nonweight bearing. Diabetic polyneuropathy associated with  type 2 diabetes mellitus (H). Diabetic ulcer of other part of right  foot associated with type 2 diabetes mellitus, with fat layer exposed  (H). History of partial ray amputation of right great toe (H).     COMPARISON: 3/31/2022 x-ray.                                                                      IMPRESSION: Previous amputation of the distal phalanx of the great  toe. Mild irregularity of the distal aspect of the proximal phalanx of  the great toe slightly more so than on the comparison study suspicious  for osteomyelitis.     Previous amputation of the middle and distal phalanges of the fourth  toe.     Resorption of the tuft of the distal phalanx of the fifth toe  likely  similar to the prior study giving slight changes in patient  positioning.     KAVON GILLESPIE MD        Media Information                  Document Information    Other:  Photograph      05/27/2022 8:01 AM   Attached To:   Office Visit on 5/27/22 with Valentino Molina DPM     Source Information    Valentino Molina DPM  [facility]             Again, thank you for allowing me to participate in the care of your patient.        Sincerely,        Valentino Molina DPM

## 2022-05-27 NOTE — PROGRESS NOTES
"ASSESSMENT:  Encounter Diagnoses   Name Primary?     Type 2 diabetes mellitus with peripheral neuropathy (H) Yes     Status post amputation of toe (H)      Diabetic ulcer of other part of right foot associated with type 2 diabetes mellitus, with fat layer exposed (H)        MEDICAL DECISION MAKING:  Although there are no clinical signs of infection, this wound probes deep and likely near or to bone.  Given this finding as well as the subtle changes on the recent x-ray, I highly recommend an MRI.  There is concern for osteomyelitis.    Judie is agreeable to the MRI right foot.    I encouraged offloading of the toe.  He has offloading shoes and boots at home.  He states he cannot do this due to his current job.    Due to the reported drainage, I recommend he use Iodosorb, after lancing the wound.    If the MRI suggests osteomyelitis, then either bone biopsy or primary amputation of the residual right hallux will be discussed.    We discussed the risk of progressive infection.    Follow-up in 2 weeks    Excisional Debridement    The excisional debridement procedure was discussed.  This included the goals of removing non-viable tissue, evaluating the full extent of wound, and promoting wound healing.  Crispin AKASH Rojas  provided verba consent.  The \"Time Out\" was called, confirming procedure and location.     Using a sterile #15 blade and tissue nippers, excisional debridment of the right hallux ulcer was performed. The hyperkeratotic eschar surrounding the wound was removed, by excising skin edges back to healthy, bleeding tissue. Non-viable tissue was excised.  Debridement was carried out to the depth of the fat layer. The ulcer base was scraped to remove bioburden and promote healing.  The area debrided was less than 20 square cm.   There was moderate bleeding with the procedure. No anesthesia was needed due to peripheral neuropathy.   A sterile dressing was applied.      Disclaimer: This note consists of symbols " derived from keyboarding, dictation and/or voice recognition software. As a result, there may be errors in the script that have gone undetected. Please consider this when interpreting information found in this chart.    Valentino Molina DPM, FACFAS, MS    [place] Department of Podiatry/Foot & Ankle Surgery      ____________________________________________________________________    HPI:       Crispin follows up for a right great toe ulceration.  He most recently saw Dr. Manriquez on 5/10/2022.  He was concerned about infection.  There was interval worsening of this wound.  The importance of offloading was stressed.  He was started on doxycycline. This is completed. He is applying Santyl.  He said he is not able to better offload due to his job.  He has not used his cam walker.  *  Past Medical History:   Diagnosis Date     Cerebral infarction (H)     2010     Chronic infection      Hyperlipidemia LDL goal <70      Hypertension      Numbness and tingling      Obesity      GILA (obstructive sleep apnea) 10/22/2018    CPAP intolerant     PVD (peripheral vascular disease) (H)     s/p left partial toe amputation     Renal stone      Tremor      Type 2 diabetes mellitus with diabetic polyneuropathy, with long-term current use of insulin (H)    *  *  Past Surgical History:   Procedure Laterality Date     AMPUTATE FOOT Left 8/18/2016    Procedure: AMPUTATE FOOT;  Surgeon: Jack Younger DPM;  Location: [facility] OR     AMPUTATE TOE(S) Right 2/1/2016    Procedure: AMPUTATE TOE(S);  Surgeon: Rachelle Manriquez DPM, Pod;  Location: [facility] OR     AMPUTATE TOE(S) Right 8/2/2019    Procedure: Right fourth toe partial amputation for treatment of osteomyelitis.;  Surgeon: Jack Younger DPM;  Location: [facility]     AMPUTATE TOE(S) Left 11/8/2019    Procedure: Left second toe amputation at the metatarsophalangeal joint.;  Surgeon: Rachelle Manriquez DPM, Podiatry/Foot and Ankle Surgery;  Location: [facility] OR     ANGIOGRAM       COLONOSCOPY        COMBINED CYSTOSCOPY, RETROGRADES, URETEROSCOPY, INSERT STENT Left 8/21/2016    Procedure: COMBINED CYSTOSCOPY, RETROGRADES, URETEROSCOPY, INSERT STENT;  Surgeon: Artemio Valenzuela MD;  Location: [facility]     COMBINED CYSTOSCOPY, RETROGRADES, URETEROSCOPY, LASER HOLMIUM LITHOTRIPSY URETER(S), INSERT STENT Left 10/3/2016    Procedure: COMBINED CYSTOSCOPY, RETROGRADES, URETEROSCOPY, LASER HOLMIUM LITHOTRIPSY URETER(S), INSERT STENT;  Surgeon: Artemio Valenzuela MD;  [facility]: [facility]     EXTRACORPOREAL SHOCK WAVE LITHOTRIPSY (ESWL) Left 9/8/2016    Procedure: EXTRACORPOREAL SHOCK WAVE LITHOTRIPSY (ESWL);  Surgeon: Artemio Valenzuela MD;  Location: [facility] OR     RECESSION GASTROCNEMIUS Right 2/1/2016    Procedure: RECESSION GASTROCNEMIUS;  Surgeon: Rachelle Manriquez, DPM, Pod;  Location: [facility] OR   *  *  Current Outpatient Medications   Medication Sig Dispense Refill     amLODIPine (NORVASC) 5 MG tablet Take 1 tablet (5 mg) by mouth 2 times daily Take one tablet twice daily  SEE MD THIS QUARTER 180 tablet 5     aspirin (ASA) 325 MG tablet Take 325 mg by mouth daily       atorvastatin (LIPITOR) 40 MG tablet Take 1 tablet (40 mg) by mouth daily Take one tablet daily SEE PROVIDER FOR NEXT REFILL 90 tablet 1     B-D U/F insulin pen needle USE TO INJECT LANTUS TWICE DAILY AND NOVOLOG THREE TIMES DAILY AS DIRECTED 500 each 0     blood glucose (NO BRAND SPECIFIED) lancets standard Use to test blood sugar 3 times daily or as directed. 300 each 3     blood glucose monitoring (NO BRAND SPECIFIED) meter device kit Use to test blood sugar 3 times daily or as directed. 1 kit 0     blood glucose monitoring (NO BRAND SPECIFIED) test strip Use to test blood sugars 3 times daily or as directed 300 strip 3     calcium carbonate (OS-NARA) 500 MG tablet Take 1 tablet by mouth 2 times daily       Cholecalciferol (VITAMIN D3 PO) Take 1,000 Units by mouth daily        Co-Enzyme Q10 100 MG CAPS Take 100 mg by mouth daily        collagenase  (SANTYL) 250 UNIT/GM external ointment Apply topically daily 30 g 1     collagenase (SANTYL) 250 UNIT/GM external ointment Apply topically daily 30 g 1     Continuous Blood Gluc Sensor (FREESTYLE LUCERO 14 DAY SENSOR) [facility] USE ONE EVERY 14 DAYS 6 each 1     furosemide (LASIX) 20 MG tablet Take 1 tablet (20 mg) by mouth daily as needed (for leg swelling) As needed for edema 30 tablet 3     gabapentin (NEURONTIN) 100 MG capsule Take 300 mg by mouth 3 times daily       Glucagon, rDNA, (GLUCAGON EMERGENCY) 1 MG KIT Inject 1 mg into the muscle once for 1 dose 1 kit 0     hydrochlorothiazide (MICROZIDE) 12.5 MG capsule Take 1 capsule (12.5 mg) by mouth every morning 90 capsule 2     insulin aspart (NOVOLOG PEN) 100 UNIT/ML pen Use daily with meals, up to 70 units a day 30 mL 11     insulin degludec (TRESIBA FLEXTOUCH) 100 UNIT/ML pen 45-55 units subcutaneous daily 60 mL 1     ketoconazole (NIZORAL) 2 % external shampoo APPLY TOPICALLY DAILY AS NEEDED FOR ITCHING OR IRRITATION. SEE MD AFTER 6 WEEKS. 120 mL 1     lisinopril (ZESTRIL) 40 MG tablet Take 1 tablet (40 mg) by mouth daily 100 tablet 5     MAGNESIUM GLYCINATE PLUS PO Take 400 mg by mouth 2 times daily       metFORMIN (GLUCOPHAGE-XR) 500 MG 24 hr tablet TAKE 2 TABLETS BY MOUTH TWICE DAILY WITH MEALS 360 tablet 1     Multiple Vitamins-Minerals (MULTIVITAMIN PO) Take 1 tablet by mouth daily        mupirocin (BACTROBAN) 2 % external ointment Apply a small amount to affected nares 2 times a day for one month. 30 g 1     Omega-3 Fatty Acids (OMEGA-3 FISH OIL PO) Take 1 g by mouth 2 times daily (with meals)        omeprazole (PRILOSEC) 40 MG DR capsule        potassium chloride ER (KLOR-CON M) 10 MEQ CR tablet Take 1 tablet (10 mEq) by mouth 2 times daily 180 tablet 1     primidone (MYSOLINE) 50 MG tablet TAKE 1 TABLET BY MOUTH THREE TIMES DAILY       semaglutide (OZEMPIC, 1 MG/DOSE,) 2 MG/1.5ML pen Inject 1 mg Subcutaneous every 7 days , on Wednesdays 6 mL 5     silver  sulfADIAZINE (SILVADENE) 1 % external cream Apply topically daily 50 g 1     tadalafil (CIALIS) 5 MG tablet TAKE 1 TABLET BY MOUTH EVERY DAY AS NEEDED one hour before intercourse 30 tablet 1         EXAM:    Vitals: /68   BMI: There is no height or weight on file to calculate BMI.    Constitutional:  Crispin Rojas is in no apparent distress, appears well-nourished.  Cooperative with history and physical exam.    Vascular:  Pedal pulses are palpable for both the DP and PT arteries.  CFT < 3 sec.  No edema.      Neuro: Light touch sensation is intact to the L4, L5, S1 distributions  No evidence of weakness, spasticity, or contracture in the lower extremities.     Derm: 1.3 cm in diameter ulceration on the plantar aspect of the residual right hallux.  The base is granular, yet probes nearly a centimeter deep and likely to bone.  There is some macerated hyperkeratotic eschar around the margins.  No malodor.  No erythema.  No purulence.    RIGHT THREE OR MORE VIEWS May 10, 2022 2:34 PM      HISTORY: Nonweight bearing. Diabetic polyneuropathy associated with  type 2 diabetes mellitus (H). Diabetic ulcer of other part of right  foot associated with type 2 diabetes mellitus, with fat layer exposed  (H). History of partial ray amputation of right great toe (H).     COMPARISON: 3/31/2022 x-ray.                                                                      IMPRESSION: Previous amputation of the distal phalanx of the great  toe. Mild irregularity of the distal aspect of the proximal phalanx of  the great toe slightly more so than on the comparison study suspicious  for osteomyelitis.     Previous amputation of the middle and distal phalanges of the fourth  toe.     Resorption of the tuft of the distal phalanx of the fifth toe likely  similar to the prior study giving slight changes in patient  positioning.     KAVON GILLESPIE MD        Media Information                  Document Information    Other:  Photograph       05/27/2022 8:01 AM   Attached To:   Office Visit on 5/27/22 with Valentino Molina DPM     Source Information    Valentino Molina DPM  [facility]  Podiatry          [de-identified] : 9 year old male follow up ear check up. Trisomy 21, ALL, s/p tube placement Jan 2017, both tubes have fallen out. Mother denies ear, nose or throat infections in the last 6 months. No otalgia. No obvious change in hearing. Denies snoring, nasal congestion. Had ABR after myringotomy in 2017 with wnl. \par

## 2022-06-02 ENCOUNTER — HOSPITAL ENCOUNTER (OUTPATIENT)
Dept: MRI IMAGING | Facility: CLINIC | Age: 62
Discharge: HOME | End: 2022-06-02
Attending: PODIATRIST | Admitting: PODIATRIST
Payer: COMMERCIAL

## 2022-06-02 ENCOUNTER — TELEPHONE (OUTPATIENT)
Dept: PODIATRY | Facility: CLINIC | Age: 62
End: 2022-06-02

## 2022-06-02 DIAGNOSIS — L97.522 ULCER OF GREAT TOE, LEFT, WITH FAT LAYER EXPOSED (H): ICD-10-CM

## 2022-06-02 PROCEDURE — 73720 MRI LWR EXTREMITY W/O&W/DYE: CPT | Mod: 26 | Performed by: RADIOLOGY

## 2022-06-02 PROCEDURE — A9585 GADOBUTROL INJECTION: HCPCS | Performed by: PODIATRIST

## 2022-06-02 PROCEDURE — 73720 MRI LWR EXTREMITY W/O&W/DYE: CPT | Mod: RT

## 2022-06-02 PROCEDURE — 255N000002 HC RX 255 OP 636: Performed by: PODIATRIST

## 2022-06-02 RX ORDER — GADOBUTROL 604.72 MG/ML
10 INJECTION INTRAVENOUS ONCE
Status: COMPLETED | OUTPATIENT
Start: 2022-06-02 | End: 2022-06-02

## 2022-06-02 RX ADMIN — GADOBUTROL 10 ML: 604.72 INJECTION INTRAVENOUS at 07:51

## 2022-06-02 NOTE — TELEPHONE ENCOUNTER
I called and reviewed the right foot MRI results with Judie.  I evaluated him in clinic on 5/27/2022.  There were clinical and radiographic concerns for osteomyelitis involving the residual right hallux.    MRI is showing osteomyelitis of the proximal phalanx and a fluid collection that might represent a developing abscess.    Crispin knowledge that he was expecting the diagnosis of osteomyelitis.  I recommend surgical intervention.  He is agreeable.  We discussed the option of trying to manage this on an outpatient basis versus inpatient.    He would like to have this treated this soon as possible and therefore opts for admission.    We discussed making appointment so I could help facilitate admission directly versus presenting to the emergency department.  He will present to the [facility] emergency department first thing in the morning.    I anticipate admission for IV antibiotics and surgical intervention, toe amputation, as early as Saturday.  He denies any peripheral vascular disease.  Pedal pulses were palpable at his last clinic visit.    Dr. Soto, podiatric hospitalist, was notified of the plan.    Dr. Valentino Molina

## 2022-06-03 ENCOUNTER — APPOINTMENT (OUTPATIENT)
Dept: ULTRASOUND IMAGING | Facility: CLINIC | Age: 62
End: 2022-06-03
Attending: HOSPITALIST
Payer: COMMERCIAL

## 2022-06-03 ENCOUNTER — HOSPITAL ENCOUNTER (INPATIENT)
Facility: CLINIC | Age: 62
LOS: 5 days | Discharge: HOME | End: 2022-06-08
Attending: EMERGENCY MEDICINE | Admitting: HOSPITALIST
Payer: COMMERCIAL

## 2022-06-03 DIAGNOSIS — I82.4Y1 ACUTE DEEP VEIN THROMBOSIS (DVT) OF PROXIMAL VEIN OF RIGHT LOWER EXTREMITY (H): Primary | ICD-10-CM

## 2022-06-03 DIAGNOSIS — I73.9 PVD (PERIPHERAL VASCULAR DISEASE) (H): ICD-10-CM

## 2022-06-03 DIAGNOSIS — M86.9 OSTEOMYELITIS OF GREAT TOE OF RIGHT FOOT (H): ICD-10-CM

## 2022-06-03 LAB
ANION GAP SERPL CALCULATED.3IONS-SCNC: 6 MMOL/L (ref 3–14)
BUN SERPL-MCNC: 33 MG/DL (ref 7–30)
CALCIUM SERPL-MCNC: 8.9 MG/DL (ref 8.5–10.1)
CHLORIDE BLD-SCNC: 105 MMOL/L (ref 94–109)
CO2 SERPL-SCNC: 29 MMOL/L (ref 20–32)
CREAT SERPL-MCNC: 0.97 MG/DL (ref 0.66–1.25)
CRP SERPL-MCNC: 8.5 MG/L (ref 0–8)
ERYTHROCYTE [DISTWIDTH] IN BLOOD BY AUTOMATED COUNT: 13.2 % (ref 10–15)
ERYTHROCYTE [DISTWIDTH] IN BLOOD BY AUTOMATED COUNT: 13.3 % (ref 10–15)
ERYTHROCYTE [DISTWIDTH] IN BLOOD BY AUTOMATED COUNT: 13.5 % (ref 10–15)
ERYTHROCYTE [SEDIMENTATION RATE] IN BLOOD BY WESTERGREN METHOD: 37 MM/HR (ref 0–20)
GFR SERPL CREATININE-BSD FRML MDRD: 89 ML/MIN/1.73M2
GLUCOSE BLD-MCNC: 89 MG/DL (ref 70–99)
GLUCOSE BLDC GLUCOMTR-MCNC: 174 MG/DL (ref 70–99)
GLUCOSE BLDC GLUCOMTR-MCNC: 188 MG/DL (ref 70–99)
GLUCOSE BLDC GLUCOMTR-MCNC: 233 MG/DL (ref 70–99)
HBA1C MFR BLD: 6.6 % (ref 0–5.6)
HCT VFR BLD AUTO: 35 % (ref 40–53)
HCT VFR BLD AUTO: 35.1 % (ref 40–53)
HCT VFR BLD AUTO: 37.2 % (ref 40–53)
HGB BLD-MCNC: 11.2 G/DL (ref 13.3–17.7)
HGB BLD-MCNC: 11.2 G/DL (ref 13.3–17.7)
HGB BLD-MCNC: 11.7 G/DL (ref 13.3–17.7)
LACTATE SERPL-SCNC: 1.4 MMOL/L (ref 0.7–2)
MCH RBC QN AUTO: 28.4 PG (ref 26.5–33)
MCH RBC QN AUTO: 28.4 PG (ref 26.5–33)
MCH RBC QN AUTO: 28.5 PG (ref 26.5–33)
MCHC RBC AUTO-ENTMCNC: 31.5 G/DL (ref 31.5–36.5)
MCHC RBC AUTO-ENTMCNC: 31.9 G/DL (ref 31.5–36.5)
MCHC RBC AUTO-ENTMCNC: 32 G/DL (ref 31.5–36.5)
MCV RBC AUTO: 89 FL (ref 78–100)
MCV RBC AUTO: 89 FL (ref 78–100)
MCV RBC AUTO: 90 FL (ref 78–100)
PLATELET # BLD AUTO: 134 10E3/UL (ref 150–450)
PLATELET # BLD AUTO: 135 10E3/UL (ref 150–450)
PLATELET # BLD AUTO: 143 10E3/UL (ref 150–450)
POTASSIUM BLD-SCNC: 4.3 MMOL/L (ref 3.4–5.3)
RBC # BLD AUTO: 3.93 10E6/UL (ref 4.4–5.9)
RBC # BLD AUTO: 3.95 10E6/UL (ref 4.4–5.9)
RBC # BLD AUTO: 4.12 10E6/UL (ref 4.4–5.9)
SARS-COV-2 RNA RESP QL NAA+PROBE: NEGATIVE
SODIUM SERPL-SCNC: 140 MMOL/L (ref 133–144)
WBC # BLD AUTO: 5.4 10E3/UL (ref 4–11)
WBC # BLD AUTO: 5.7 10E3/UL (ref 4–11)
WBC # BLD AUTO: 5.9 10E3/UL (ref 4–11)

## 2022-06-03 PROCEDURE — 120N000001 HC R&B MED SURG/OB

## 2022-06-03 PROCEDURE — 99285 EMERGENCY DEPT VISIT HI MDM: CPT | Mod: 25

## 2022-06-03 PROCEDURE — 99223 1ST HOSP IP/OBS HIGH 75: CPT | Mod: AI | Performed by: HOSPITALIST

## 2022-06-03 PROCEDURE — 36415 COLL VENOUS BLD VENIPUNCTURE: CPT | Performed by: EMERGENCY MEDICINE

## 2022-06-03 PROCEDURE — 85027 COMPLETE CBC AUTOMATED: CPT | Performed by: INTERNAL MEDICINE

## 2022-06-03 PROCEDURE — 36415 COLL VENOUS BLD VENIPUNCTURE: CPT | Performed by: STUDENT IN AN ORGANIZED HEALTH CARE EDUCATION/TRAINING PROGRAM

## 2022-06-03 PROCEDURE — 83605 ASSAY OF LACTIC ACID: CPT | Performed by: EMERGENCY MEDICINE

## 2022-06-03 PROCEDURE — 250N000011 HC RX IP 250 OP 636: Performed by: HOSPITALIST

## 2022-06-03 PROCEDURE — 86140 C-REACTIVE PROTEIN: CPT | Performed by: EMERGENCY MEDICINE

## 2022-06-03 PROCEDURE — 82310 ASSAY OF CALCIUM: CPT | Performed by: EMERGENCY MEDICINE

## 2022-06-03 PROCEDURE — 93971 EXTREMITY STUDY: CPT | Mod: RT

## 2022-06-03 PROCEDURE — 87040 BLOOD CULTURE FOR BACTERIA: CPT | Performed by: EMERGENCY MEDICINE

## 2022-06-03 PROCEDURE — 250N000011 HC RX IP 250 OP 636: Performed by: EMERGENCY MEDICINE

## 2022-06-03 PROCEDURE — 96365 THER/PROPH/DIAG IV INF INIT: CPT

## 2022-06-03 PROCEDURE — 83036 HEMOGLOBIN GLYCOSYLATED A1C: CPT | Performed by: HOSPITALIST

## 2022-06-03 PROCEDURE — 250N000011 HC RX IP 250 OP 636: Performed by: STUDENT IN AN ORGANIZED HEALTH CARE EDUCATION/TRAINING PROGRAM

## 2022-06-03 PROCEDURE — 250N000013 HC RX MED GY IP 250 OP 250 PS 637: Performed by: HOSPITALIST

## 2022-06-03 PROCEDURE — 87070 CULTURE OTHR SPECIMN AEROBIC: CPT | Performed by: EMERGENCY MEDICINE

## 2022-06-03 PROCEDURE — 85652 RBC SED RATE AUTOMATED: CPT | Performed by: EMERGENCY MEDICINE

## 2022-06-03 PROCEDURE — U0005 INFEC AGEN DETEC AMPLI PROBE: HCPCS | Performed by: EMERGENCY MEDICINE

## 2022-06-03 PROCEDURE — 36415 COLL VENOUS BLD VENIPUNCTURE: CPT | Performed by: INTERNAL MEDICINE

## 2022-06-03 PROCEDURE — 85027 COMPLETE CBC AUTOMATED: CPT | Performed by: EMERGENCY MEDICINE

## 2022-06-03 PROCEDURE — 96367 TX/PROPH/DG ADDL SEQ IV INF: CPT

## 2022-06-03 PROCEDURE — 85014 HEMATOCRIT: CPT | Performed by: STUDENT IN AN ORGANIZED HEALTH CARE EDUCATION/TRAINING PROGRAM

## 2022-06-03 PROCEDURE — C9803 HOPD COVID-19 SPEC COLLECT: HCPCS

## 2022-06-03 PROCEDURE — 85049 AUTOMATED PLATELET COUNT: CPT | Performed by: STUDENT IN AN ORGANIZED HEALTH CARE EDUCATION/TRAINING PROGRAM

## 2022-06-03 PROCEDURE — 258N000003 HC RX IP 258 OP 636: Performed by: EMERGENCY MEDICINE

## 2022-06-03 RX ORDER — LIDOCAINE 40 MG/G
CREAM TOPICAL
Status: DISCONTINUED | OUTPATIENT
Start: 2022-06-03 | End: 2022-06-08 | Stop reason: HOSPADM

## 2022-06-03 RX ORDER — HEPARIN SODIUM 10000 [USP'U]/100ML
0-5000 INJECTION, SOLUTION INTRAVENOUS CONTINUOUS
Status: DISPENSED | OUTPATIENT
Start: 2022-06-03 | End: 2022-06-05

## 2022-06-03 RX ORDER — ONDANSETRON 2 MG/ML
4 INJECTION INTRAMUSCULAR; INTRAVENOUS EVERY 6 HOURS PRN
Status: DISCONTINUED | OUTPATIENT
Start: 2022-06-03 | End: 2022-06-08 | Stop reason: HOSPADM

## 2022-06-03 RX ORDER — HEPARIN SODIUM 10000 [USP'U]/100ML
0-5000 INJECTION, SOLUTION INTRAVENOUS CONTINUOUS
Status: DISCONTINUED | OUTPATIENT
Start: 2022-06-03 | End: 2022-06-03

## 2022-06-03 RX ORDER — FUROSEMIDE 40 MG
40 TABLET ORAL DAILY
Status: DISCONTINUED | OUTPATIENT
Start: 2022-06-04 | End: 2022-06-08 | Stop reason: HOSPADM

## 2022-06-03 RX ORDER — ATORVASTATIN CALCIUM 40 MG/1
40 TABLET, FILM COATED ORAL DAILY
Status: DISCONTINUED | OUTPATIENT
Start: 2022-06-03 | End: 2022-06-04

## 2022-06-03 RX ORDER — NICOTINE POLACRILEX 4 MG
15-30 LOZENGE BUCCAL
Status: DISCONTINUED | OUTPATIENT
Start: 2022-06-03 | End: 2022-06-08 | Stop reason: HOSPADM

## 2022-06-03 RX ORDER — ONDANSETRON 4 MG/1
4 TABLET, ORALLY DISINTEGRATING ORAL EVERY 6 HOURS PRN
Status: DISCONTINUED | OUTPATIENT
Start: 2022-06-03 | End: 2022-06-08 | Stop reason: HOSPADM

## 2022-06-03 RX ORDER — HYDROCHLOROTHIAZIDE 12.5 MG/1
12.5 CAPSULE ORAL EVERY MORNING
Status: DISCONTINUED | OUTPATIENT
Start: 2022-06-04 | End: 2022-06-08 | Stop reason: HOSPADM

## 2022-06-03 RX ORDER — CEFEPIME HYDROCHLORIDE 1 G/1
1 INJECTION, POWDER, FOR SOLUTION INTRAMUSCULAR; INTRAVENOUS EVERY 8 HOURS
Status: DISCONTINUED | OUTPATIENT
Start: 2022-06-03 | End: 2022-06-04

## 2022-06-03 RX ORDER — AMLODIPINE BESYLATE 5 MG/1
5 TABLET ORAL 2 TIMES DAILY
Status: DISCONTINUED | OUTPATIENT
Start: 2022-06-03 | End: 2022-06-08 | Stop reason: HOSPADM

## 2022-06-03 RX ORDER — DEXTROSE MONOHYDRATE 25 G/50ML
25-50 INJECTION, SOLUTION INTRAVENOUS
Status: DISCONTINUED | OUTPATIENT
Start: 2022-06-03 | End: 2022-06-08 | Stop reason: HOSPADM

## 2022-06-03 RX ORDER — LISINOPRIL 40 MG/1
40 TABLET ORAL DAILY
Status: DISCONTINUED | OUTPATIENT
Start: 2022-06-04 | End: 2022-06-08 | Stop reason: HOSPADM

## 2022-06-03 RX ADMIN — HEPARIN SODIUM AND DEXTROSE 1800 UNITS/HR: 10000; 5 INJECTION INTRAVENOUS at 23:01

## 2022-06-03 RX ADMIN — CEFEPIME HYDROCHLORIDE 2 G: 2 INJECTION, POWDER, FOR SOLUTION INTRAVENOUS at 13:52

## 2022-06-03 RX ADMIN — CEFEPIME HYDROCHLORIDE 1 G: 1 INJECTION, POWDER, FOR SOLUTION INTRAMUSCULAR; INTRAVENOUS at 22:08

## 2022-06-03 RX ADMIN — VANCOMYCIN HYDROCHLORIDE 1500 MG: 5 INJECTION, POWDER, LYOPHILIZED, FOR SOLUTION INTRAVENOUS at 14:38

## 2022-06-03 RX ADMIN — ATORVASTATIN CALCIUM 40 MG: 40 TABLET, FILM COATED ORAL at 22:08

## 2022-06-03 RX ADMIN — AMLODIPINE BESYLATE 5 MG: 5 TABLET ORAL at 22:08

## 2022-06-03 ASSESSMENT — ENCOUNTER SYMPTOMS
FEVER: 0
ARTHRALGIAS: 1
SHORTNESS OF BREATH: 0
VOMITING: 0
ABDOMINAL PAIN: 0
DIARRHEA: 0
CHILLS: 0

## 2022-06-03 ASSESSMENT — ACTIVITIES OF DAILY LIVING (ADL)
ADLS_ACUITY_SCORE: 35

## 2022-06-03 NOTE — ED PROVIDER NOTES
History   Chief Complaint:  Foot Problems       HPI   Crispin Rojas is a 62 year old male with history of Type 2 diabetes mellitus with diabetic polyneuropathy who presents with toe pain. Per patient's report, he has had right great toe infection for a long time. Nothing has changed symptom-wise. His MRI result came out yesterday that showed osteomyelitis, and his podiatrist Dr. Molina recommended toe amputation. Drainage has been regular for him.  No fever, chills, chest pain, shortness of breath, abdominal pain, vomiting, diarrhea, or any other concerns.     MR right foot without and with contrast 6/2/2022  Impression:  1. Great toe, proximal phalanx osteomyelitis with signal abnormality  extending to the base.  *  Associated plantar ulcer and sinus tract at the level of proximal  phalangeal head.  *  Underlying maximal dimension 15 x 20 x 25 mm (mediolateral x  dorsoplantar x proximodistal) fluid accumulation around the proximal  phalangeal head. Although no thick rim enhancement, this may be  represent early abscess given suggestion of some wall formation.    Review of Systems   Constitutional: Negative for chills and fever.   Respiratory: Negative for shortness of breath.    Cardiovascular: Negative for chest pain.   Gastrointestinal: Negative for abdominal pain, diarrhea and vomiting.   Musculoskeletal: Positive for arthralgias.   All other systems reviewed and are negative.      Allergies:  Bactrim [Sulfamethoxazole W/Trimethoprim]  Sulfa Drugs  Niacin  Simvastatin    Medications:  amLODIPine (NORVASC) 5 MG tablet  aspirin (ASA) 325 MG tablet  atorvastatin (LIPITOR) 40 MG tablet  furosemide (LASIX) 20 MG tablet  gabapentin (NEURONTIN) 100 MG capsule  Glucagon, rDNA, (GLUCAGON EMERGENCY) 1 MG KIT  hydrochlorothiazide (MICROZIDE) 12.5 MG capsule  insulin aspart (NOVOLOG PEN) 100 UNIT/ML pen  insulin degludec (TRESIBA FLEXTOUCH) 100 UNIT/ML pen  lisinopril (ZESTRIL) 40 MG tablet  metFORMIN (GLUCOPHAGE-XR) 500  "MG 24 hr tablet  omeprazole (PRILOSEC) 40 MG DR capsule  potassium chloride ER (KLOR-CON M) 10 MEQ CR tablet  primidone (MYSOLINE) 50 MG tablet  semaglutide (OZEMPIC, 1 MG/DOSE,) 2 MG/1.5ML pen  tadalafil (CIALIS) 5 MG tablet    Past Medical History:     Cerebral Infarction  Chronic Infection  Hyperlipidemia  Hypertension  Numbness and Tingling  Obesity  GILA  Renal Stone  Type 2 diabetes mellitus with diabetic polyneuropathy  Diabetic Ulcer of Lower Extremity  Thoracic Aortic Aneurysm without Rupture  RBBB  Chronic Left Shoulder Pain  Cervicalgia  Calculus of Kidney  Morbid Obesity  PVD    Past Surgical History:    Toe Amputation, bilateral feet  Angiogram  Retrogrades, left  Extracorporeal Shock Wave Lithotripsy  Recession Gastrocnemius     Family History:    Mother: arthritis, lupus, diabetes, cerebrovascular disease, cancer    Social History:  Presents alone. Never smoker. Red wine 3x per month.    Physical Exam     Patient Vitals for the past 24 hrs:   BP Temp src Pulse Resp SpO2 Height Weight   06/03/22 1400 -- -- -- -- 96 % -- --   06/03/22 1301 127/60 -- 52 -- 99 % -- --   06/03/22 0940 (!) 155/66 Temporal 74 16 98 % -- --   06/03/22 0926 -- -- -- -- -- 1.803 m (5' 11\") 108.9 kg (240 lb)       Physical Exam  Vitals and nursing note reviewed.   Constitutional:       General: He is not in acute distress.     Appearance: Normal appearance. He is obese. He is not ill-appearing.   HENT:      Head: Normocephalic and atraumatic.      Right Ear: External ear normal.      Left Ear: External ear normal.      Nose: Nose normal.   Eyes:      Conjunctiva/sclera: Conjunctivae normal.   Cardiovascular:      Rate and Rhythm: Normal rate and regular rhythm.      Heart sounds: No murmur heard.  Pulmonary:      Effort: Pulmonary effort is normal. No respiratory distress.      Breath sounds: No wheezing, rhonchi or rales.   Abdominal:      General: Abdomen is flat. There is no distension.      Palpations: Abdomen is soft.      " Tenderness: There is no abdominal tenderness. There is no guarding or rebound.   Musculoskeletal:         General: No swelling or deformity.      Cervical back: Normal range of motion and neck supple.      Right foot: Swelling present.        Feet:    Skin:     General: Skin is warm and dry.      Findings: No rash.   Neurological:      Mental Status: He is alert and oriented to person, place, and time.   Psychiatric:         Behavior: Behavior normal.         Emergency Department Course     Laboratory:  Labs Ordered and Resulted from Time of ED Arrival to Time of ED Departure   CBC WITH PLATELETS - Abnormal       Result Value    WBC Count 5.9      RBC Count 4.12 (*)     Hemoglobin 11.7 (*)     Hematocrit 37.2 (*)     MCV 90      MCH 28.4      MCHC 31.5      RDW 13.5      Platelet Count 143 (*)    BASIC METABOLIC PANEL - Abnormal    Sodium 140      Potassium 4.3      Chloride 105      Carbon Dioxide (CO2) 29      Anion Gap 6      Urea Nitrogen 33 (*)     Creatinine 0.97      Calcium 8.9      Glucose 89      GFR Estimate 89     ERYTHROCYTE SEDIMENTATION RATE AUTO - Abnormal    Erythrocyte Sedimentation Rate 37 (*)    CRP INFLAMMATION - Abnormal    CRP Inflammation 8.5 (*)    LACTIC ACID WHOLE BLOOD - Normal    Lactic Acid 1.4     COVID-19 VIRUS (CORONAVIRUS) BY PCR - Normal    SARS CoV2 PCR Negative     AEROBIC BACTERIAL CULTURE ROUTINE   BLOOD CULTURE   BLOOD CULTURE      Emergency Department Course:       Reviewed:  I reviewed nursing notes, vitals, past medical history and Care Everywhere    Assessments:   I obtained history and examined the patient as noted above.    I rechecked the patient and explained findings.     Consults:  0815 I spoke with Dr. Hood of the hospitalist service regarding patient's presentation, findings, and plan of care.    Interventions:  Medications   ceFEPIme (MAXIPIME) 2 g vial to attach to  ml bag for ADULTS or 50 ml bag for PEDS (2 g Intravenous New Bag 6/3/22 5332)   vancomycin  1500 mg in 0.9% NaCl 250 ml intermittent infusion 1,500 mg (has no administration in time range)     Disposition:  The patient was admitted to the hospital under the care of Dr. Hood.     Impression & Plan     Medical Decision Makin yo M with osteomyelitis of his great toe.  He does not appear systemically ill.  Was sent for admission for IV abx and likely surgical amputation of the toe by Podiatry.  Will check labs and cx's and start IV abx and admit to Hospitalist.       Diagnosis:    ICD-10-CM    1. Osteomyelitis of great toe of right foot (H)  M86.9        Scribe Disclosure:  I, Malick Gonzalez, am serving as a scribe at 12:34 PM on 6/3/2022 to document services personally performed by Ulisses Martin MD based on my observations and the provider's statements to me.        Ulisses Martin MD  [phone]

## 2022-06-03 NOTE — H&P
[facility]    History and Physical  Hospitalist       Date of Admission:  6/3/2022    Assessment and Plan:      Crispin Rojas is a 62 year old male with history of type 2 diabetes myelitis, hypertension, multiple history of osteomyelitis requiring toe amputation, and left second toe amputation who presents to the hospital for evaluation of osteomyelitis of his right toe.  Patient had an MRI performed by podiatry which shows grade 2 proximal phalanx osteomyelitis extending to the base.  He was directed by podiatry to present to the ED for further evaluation and treatment for osteomyelitis and plan for surgery tomorrow.    Osteomyelitis right toe  -He was given IV cefepime along with vancomycin in the ED will continue.  -No systemic signs of symptoms  -Podiatry consult  -May consider ID consult pending results     Right calf pain  - US RLE    Chronic problems:  IDDM II: Home insulin is Tresiba 55 units daily,  with NovoLog patient unsure of dosage he says he uses a pen, Ozempic along with metformin.  - Will order Tresiba 20 units daily (NPO in the evening) along with sliding scale and carb counting  HTN: Patient takes amlodipine and lisinopril will resume once reconciled  Neuropathy: continue gabapentin.  HLD: Continue atorvastatin.   ---------     # Code status: Full   # Anticipated discharge date and Disposition:2-3 days  # DVT: SCDS  # IVF: no                      Paty Hood MD  Text Page (7am - 6pm, M-F)          Primary Care Physician   *Johann Serna    Chief Complaint   Foot osteomyelitis     History is obtained from the patient    History of Present Illness   Crispin Rojas is a 62 year old male with history of type 2 diabetes myelitis, hypertension, multiple history of osteomyelitis requiring toe amputation, and left second toe amputation who presents to the hospital for evaluation of osteomyelitis of his right toe.  Patient states his right toe has been bothering him since  December and has not really worsened denies any purulent discharge.  He denies any other systemic symptoms nausea vomiting fevers chills malaise.  Otherwise he has no other complaints he does complain of right calf pain.  No chest pain no shortness of breath    Past Medical History    I have reviewed this patient's medical history and updated it with pertinent information if needed.   Past Medical History:   Diagnosis Date     Cerebral infarction (H)     2010     Chronic infection      Hyperlipidemia LDL goal <70      Hypertension      Numbness and tingling      Obesity      GILA (obstructive sleep apnea) 10/22/2018    CPAP intolerant     PVD (peripheral vascular disease) (H)     s/p left partial toe amputation     Renal stone      Tremor      Type 2 diabetes mellitus with diabetic polyneuropathy, with long-term current use of insulin (H)        Past Surgical History   I have reviewed this patient's surgical history and updated it with pertinent information if needed.  Past Surgical History:   Procedure Laterality Date     AMPUTATE FOOT Left 8/18/2016    Procedure: AMPUTATE FOOT;  Surgeon: Jack Younger DPM;  Location: [facility] OR     AMPUTATE TOE(S) Right 2/1/2016    Procedure: AMPUTATE TOE(S);  Surgeon: Rachelle Manriquez DPM, Pod;  Location: [facility] OR     AMPUTATE TOE(S) Right 8/2/2019    Procedure: Right fourth toe partial amputation for treatment of osteomyelitis.;  Surgeon: Jack Younger DPM;  Location: [facility] OR     AMPUTATE TOE(S) Left 11/8/2019    Procedure: Left second toe amputation at the metatarsophalangeal joint.;  Surgeon: Rachelle Manriquez DPM, Podiatry/Foot and Ankle Surgery;  Location:  OR     ANGIOGRAM       COLONOSCOPY       COMBINED CYSTOSCOPY, RETROGRADES, URETEROSCOPY, INSERT STENT Left 8/21/2016    Procedure: COMBINED CYSTOSCOPY, RETROGRADES, URETEROSCOPY, INSERT STENT;  Surgeon: Artemio Valenzuela MD;  Location:  OR     COMBINED CYSTOSCOPY, RETROGRADES, URETEROSCOPY, LASER HOLMIUM  LITHOTRIPSY URETER(S), INSERT STENT Left 10/3/2016    Procedure: COMBINED CYSTOSCOPY, RETROGRADES, URETEROSCOPY, LASER HOLMIUM LITHOTRIPSY URETER(S), INSERT STENT;  Surgeon: Artemio Valenzuela MD;  Location: [facility] OR     EXTRACORPOREAL SHOCK WAVE LITHOTRIPSY (ESWL) Left 9/8/2016    Procedure: EXTRACORPOREAL SHOCK WAVE LITHOTRIPSY (ESWL);  Surgeon: Artemio Valenzuela MD;  Location: [facility] OR     RECESSION GASTROCNEMIUS Right 2/1/2016    Procedure: RECESSION GASTROCNEMIUS;  Surgeon: Rachelle Manriquez, DPM, Pod;  Location: [facility] OR       Prior to Admission Medications   Prior to Admission Medications   Prescriptions Last Dose Informant Patient Reported? Taking?   B-D U/F insulin pen needle   No No   Sig: USE TO INJECT LANTUS TWICE DAILY AND NOVOLOG THREE TIMES DAILY AS DIRECTED   Cholecalciferol (VITAMIN D3 PO)   Yes No   Sig: Take 1,000 Units by mouth daily    Co-Enzyme Q10 100 MG CAPS   Yes No   Sig: Take 100 mg by mouth daily    Continuous Blood Gluc Sensor ([place] LUCERO 14 DAY SENSOR[place] [facility]   No No   Sig: USE ONE EVERY 14 DAYS   Glucagon, rDNA, (GLUCAGON EMERGENCY) 1 MG KIT   No No   Sig: Inject 1 mg into the muscle once for 1 dose   MAGNESIUM GLYCINATE PLUS PO   Yes No   Sig: Take 400 mg by mouth 2 times daily   Multiple Vitamins-Minerals (MULTIVITAMIN PO)   Yes No   Sig: Take 1 tablet by mouth daily    Omega-3 Fatty Acids (OMEGA-3 FISH OIL PO)   Yes No   Sig: Take 1 g by mouth 2 times daily (with meals)    amLODIPine (NORVASC) 5 MG tablet   Yes No   Sig: Take 1 tablet (5 mg) by mouth 2 times daily Take one tablet twice daily  SEE MD THIS QUARTER   aspirin (ASA) 325 MG tablet   Yes No   Sig: Take 325 mg by mouth daily   atorvastatin (LIPITOR) 40 MG tablet   No No   Sig: Take 1 tablet (40 mg) by mouth daily Take one tablet daily SEE PROVIDER FOR NEXT REFILL   blood glucose (NO BRAND SPECIFIED) lancets standard   No No   Sig: Use to test blood sugar 3 times daily or as directed.   blood glucose monitoring (NO BRAND  SPECIFIED) meter device kit   No No   Sig: Use to test blood sugar 3 times daily or as directed.   blood glucose monitoring (NO BRAND SPECIFIED) test strip   No No   Sig: Use to test blood sugars 3 times daily or as directed   calcium carbonate (OS-NARA) 500 MG tablet   Yes No   Sig: Take 1 tablet by mouth 2 times daily   collagenase (SANTYL) 250 UNIT/GM external ointment   No No   Sig: Apply topically daily   collagenase (SANTYL) 250 UNIT/GM external ointment   No No   Sig: Apply topically daily   furosemide (LASIX) 20 MG tablet   Yes No   Sig: Take 1 tablet (20 mg) by mouth daily as needed (for leg swelling) As needed for edema   gabapentin (NEURONTIN) 100 MG capsule   Yes No   Sig: Take 300 mg by mouth 3 times daily   hydrochlorothiazide (MICROZIDE) 12.5 MG capsule   No No   Sig: Take 1 capsule (12.5 mg) by mouth every morning   insulin aspart (NOVOLOG PEN) 100 UNIT/ML pen   No No   Sig: Use daily with meals, up to 70 units a day   insulin degludec (TRESIBA FLEXTOUCH) 100 UNIT/ML pen   No No   Si-55 units subcutaneous daily   ketoconazole (NIZORAL) 2 % external shampoo   No No   Sig: APPLY TOPICALLY DAILY AS NEEDED FOR ITCHING OR IRRITATION. SEE MD AFTER 6 WEEKS.   lisinopril (ZESTRIL) 40 MG tablet   Yes No   Sig: Take 1 tablet (40 mg) by mouth daily   metFORMIN (GLUCOPHAGE-XR) 500 MG 24 hr tablet   No No   Sig: TAKE 2 TABLETS BY MOUTH TWICE DAILY WITH MEALS   mupirocin (BACTROBAN) 2 % external ointment   No No   Sig: Apply a small amount to affected nares 2 times a day for one month.   omeprazole (PRILOSEC) 40 MG DR capsule   Yes No   potassium chloride ER (KLOR-CON M) 10 MEQ CR tablet   No No   Sig: Take 1 tablet (10 mEq) by mouth 2 times daily   primidone (MYSOLINE) 50 MG tablet   Yes No   Sig: TAKE 1 TABLET BY MOUTH THREE TIMES DAILY   semaglutide (OZEMPIC, 1 MG/DOSE,) 2 MG/1.5ML pen   No No   Sig: Inject 1 mg Subcutaneous every 7 days , on    silver sulfADIAZINE (SILVADENE) 1 % external cream    No No   Sig: Apply topically daily   tadalafil (CIALIS) 5 MG tablet   No No   Sig: TAKE 1 TABLET BY MOUTH EVERY DAY AS NEEDED one hour before intercourse      Facility-Administered Medications: None     Allergies   Allergies   Allergen Reactions     Bactrim [Sulfamethoxazole W/Trimethoprim] Nausea and Vomiting     Sulfa Drugs Nausea     Niacin Swelling     SIMCOR caused edema in extremities     Simvastatin Swelling     SIMCOR caused edema in extremities       Social History   I have reviewed this patient's social history and updated it with pertinent information if needed. Crispin Rojas  reports that he has never smoked. He has never used smokeless tobacco. He reports current alcohol use. He reports that he does not use drugs.    Family History   Family history reviewed with patient and is noncontributory.    ROS   12 point recent discussed with patient negative as per HPI    Physical Exam     Temp src: Temporal BP: 127/60 Pulse: 52   Resp: 16 SpO2: 96 % O2 Device: None (Room air)    Vital Signs with Ranges  Pulse:  [52-74] 52  Resp:  [16] 16  BP: (127-155)/(60-66) 127/60  SpO2:  [96 %-99 %] 96 %  240 lbs 0 oz    General: Pt in NAD, normal appearance  HEENT: PERRLA, EOMI, normocephalic / atraumatic no cervical LAD, no bruit, no pallor, WNL oropharynx, neck supple  Cardiac: +S1, S2, RRR, no MRG, no edema  Lungs: Clear to Auscultation Bilateral, normal breathing without accessory muscle usage, no wheezing, rhonchi or crackles  GI: soft NT/ND +bowel sounds all quadrants, no hepatosplenomegaly  Psych: normal mood and affect, A&Ox3  Neurological: A&O x3, CN II-XII grossly intact, sensation intact normal gait  Skin: Right toe with ulceration no discharge noted    Data   Data reviewed today:  I personally reviewed no images or EKG's today.  Recent Labs   Lab 06/03/22  1203   WBC 5.9   HGB 11.7*   MCV 90   *      POTASSIUM 4.3   CHLORIDE 105   CO2 29   BUN 33*   CR 0.97   ANIONGAP 6   NARA 8.9   GLC 89        No results found for this or any previous visit (from the past 24 hour(s)).

## 2022-06-03 NOTE — PHARMACY-ADMISSION MEDICATION HISTORY
Admission medication history interview status for this patient is complete. See [facility] admission navigator for allergy information, prior to admission medications and immunization status.     Medication history interview done, indicate source(s): Patient  Medication history resources (including written lists, pill bottles, clinic record):None    Changes made to PTA medication list:  Added: vit d  Changed: novolog, tresiba, potassium, silvadene  Reported as Not Taking: none  Removed: mupirocin    Actions taken by pharmacist (provider contacted, etc):None     Additional medication history information:None    Medication reconciliation/reorder completed by provider prior to medication history?  N   (Y/N)     For patients on insulin therapy:   Do you use sliding scale insulin based on blood sugars? Y  What is your pre-meal insulin coverage?  15-25 units  Do you typically eat three meals a day? Y  How many times do you check your blood glucose per day? 3  How many episodes of hypoglycemia do you typically have per month? Not recently  Do you have a Continuous Glucose Monitor (CGM)?  N    Prior to Admission medications    Medication Sig Last Dose Taking? Auth Provider   amLODIPine (NORVASC) 5 MG tablet Take 1 tablet (5 mg) by mouth 2 times daily Take one tablet twice daily  SEE MD THIS QUARTER 6/3/2022 at am Yes Singh Theodore MD   aspirin (ASA) 325 MG tablet Take 325 mg by mouth daily 6/3/2022 at Unknown time Yes Reported, Patient   atorvastatin (LIPITOR) 40 MG tablet Take 1 tablet (40 mg) by mouth daily Take one tablet daily SEE PROVIDER FOR NEXT REFILL 6/2/2022 at hs Yes Johann Serna DO   calcium carbonate (OS-NARA) 500 MG tablet Take 1 tablet by mouth 2 times daily 6/3/2022 at am Yes Reported, Patient   Cholecalciferol (VITAMIN D3 PO) Take 1,000 Units by mouth daily  6/3/2022 at Unknown time Yes Reported, Patient   Co-Enzyme Q10 100 MG CAPS Take 100 mg by mouth daily  6/3/2022 at Unknown time Yes Jessica  Adne Long MD   collagenase (SANTYL) 250 UNIT/GM external ointment Apply topically daily 6/3/2022 at Unknown time Yes Rachelle Manriquez DPM, Podiatry/Foot and Ankle Surgery   furosemide (LASIX) 20 MG tablet Take 1 tablet (20 mg) by mouth daily as needed (for leg swelling) As needed for edema  Yes Singh Theodore MD   gabapentin (NEURONTIN) 100 MG capsule Take 300 mg by mouth 3 times daily 6/3/2022 at am Yes Unknown, Entered By History   Glucagon, rDNA, (GLUCAGON EMERGENCY) 1 MG KIT Inject 1 mg into the muscle once for 1 dose  Yes Jasmin Arteaga APRN CNP   hydrochlorothiazide (MICROZIDE) 12.5 MG capsule Take 1 capsule (12.5 mg) by mouth every morning 6/3/2022 at Unknown time Yes Singh Theodore MD   insulin aspart (NOVOLOG PEN) 100 UNIT/ML pen Use daily with meals, up to 70 units a day  Patient taking differently: Inject 15-25 Units Subcutaneous 3 times daily (with meals) Use daily with meals, up to 70 units a day 6/3/2022 at am Yes Shira Montanez NP   insulin degludec (TRESIBA FLEXTOUCH) 100 UNIT/ML pen 45-55 units subcutaneous daily  Patient taking differently: Inject 55 Units Subcutaneous daily 45-55 units subcutaneous daily 6/3/2022 at Unknown time Yes Shira Montanez NP   ketoconazole (NIZORAL) 2 % external shampoo APPLY TOPICALLY DAILY AS NEEDED FOR ITCHING OR IRRITATION. SEE MD AFTER 6 WEEKS.  Yes Aden Lopez MD   lisinopril (ZESTRIL) 40 MG tablet Take 1 tablet (40 mg) by mouth daily 6/3/2022 at Unknown time Yes Singh Theodore MD   MAGNESIUM GLYCINATE PLUS PO Take 400 mg by mouth 2 times daily 6/3/2022 at am Yes Reported, Patient   metFORMIN (GLUCOPHAGE-XR) 500 MG 24 hr tablet TAKE 2 TABLETS BY MOUTH TWICE DAILY WITH MEALS 6/3/2022 at am Yes Shira Montanez NP   Multiple Vitamins-Minerals (MULTIVITAMIN PO) Take 1 tablet by mouth daily  6/3/2022 at Unknown time Yes Reported, Patient   Omega-3 Fatty Acids (OMEGA-3 FISH OIL PO) Take 1 g by mouth 2 times daily (with meals)   6/3/2022 at am Yes Reported, Patient   omeprazole (PRILOSEC) 40 MG DR capsule Take 40 mg by mouth daily 6/3/2022 at Unknown time Yes Reported, Patient   potassium chloride ER (KLOR-CON M) 10 MEQ CR tablet Take 1 tablet (10 mEq) by mouth 2 times daily  Patient taking differently: Take 10 mEq by mouth 2 times daily as needed (takes when taking diuretic)  Yes Johann Serna DO   primidone (MYSOLINE) 50 MG tablet TAKE 1 TABLET BY MOUTH THREE TIMES DAILY 6/3/2022 at am Yes Reported, Patient   semaglutide (OZEMPIC, 1 MG/DOSE,) 2 MG/1.5ML pen Inject 1 mg Subcutaneous every 7 days , on Wednesdays 6/1/2022 at Unknown time Yes Shira Montanez NP   silver sulfADIAZINE (SILVADENE) 1 % external cream Apply topically daily  Patient taking differently: Apply topically daily as needed  Yes Valentino Molina DPM   tadalafil (CIALIS) 5 MG tablet TAKE 1 TABLET BY MOUTH EVERY DAY AS NEEDED one hour before intercourse  Yes Aden Lopez MD   VITAMIN D PO Take by mouth daily 6/2/2022 at Unknown time Yes Unknown, Entered By History   B-D U/F insulin pen needle USE TO INJECT LANTUS TWICE DAILY AND NOVOLOG THREE TIMES DAILY AS DIRECTED   Shira Montanez NP   blood glucose (NO BRAND SPECIFIED) lancets standard Use to test blood sugar 3 times daily or as directed.   Jasmin Arteaga APRN CNP   blood glucose monitoring (NO BRAND SPECIFIED) meter device kit Use to test blood sugar 3 times daily or as directed.   Aden Lopez MD   blood glucose monitoring (NO BRAND SPECIFIED) test strip Use to test blood sugars 3 times daily or as directed   Jasmin Arteaga APRN CNP   Continuous Blood Gluc Sensor (FREESTYLE LUCERO 14 DAY SENSOR) [facility] [place] USE ONE EVERY 14 DAYS   Shira Montanez NP   insulin lispro (HUMALOG KWIKPEN) 100 UNIT/ML (1 unit dial) KWIKPEN 1 units per 9 grams of carbohydrate before each meal + 1:25 over 150 correction.  TDD 70 units   Jasmin Arteaga APRN CNP

## 2022-06-03 NOTE — ED TRIAGE NOTES
Pt sent here for IVF/antiobitcs. Pt with known infection to bone of R foot, first digit. Had MRI last night, told to be seen today. ABC intact.

## 2022-06-03 NOTE — ED NOTES
.  [facility]  ED Nurse Handoff Report    Crispin Rojas is a 62 year old male   ED Chief complaint: Foot Problems  . ED Diagnosis:   Final diagnoses:   Osteomyelitis of great toe of right foot (H)     Allergies:   Allergies   Allergen Reactions     Bactrim [Sulfamethoxazole W/Trimethoprim] Nausea and Vomiting     Sulfa Drugs Nausea     Niacin Swelling     SIMCOR caused edema in extremities     Simvastatin Swelling     SIMCOR caused edema in extremities       Code Status: Full Code  Activity level - Baseline/Home:  Independent. Activity Level - Current:   Stand by Assist. Lift room needed: No. Bariatric: No   Needed: No   Isolation: No. Infection: Not Applicable.     Vital Signs:   Vitals:    06/03/22 1630 06/03/22 1700 06/03/22 1715 06/03/22 1730   BP: 131/73 136/82  (!) 130/91   Pulse: 63 64  60   Resp:       TempSrc:       SpO2: 97% 94% 96% 96%   Weight:       Height:           Cardiac Rhythm:  ,      Pain level:    Patient confused: No. Patient Falls Risk: Yes.   Elimination Status: Has voided   Patient Report - Initial Complaint: a 62 year old male with history of Type 2 diabetes mellitus with diabetic polyneuropathy who presents with toe pain. Per patient's report, he has had toe infection on right foot for a long time. No fever, chills, cp, sob, ab, vomiting, diarrhea, or any other concerns. Drainage has been regular. Nothing has changed symptom-wise, but his podiatrist was concerned of his MRI result that came out yesterday. .   Focused Assessment: Musculoskeletal (Adult) - Musculoskeletal WDL: .WDL except; all  Right Joint Tenderness: digit(s) (posterior right great toe; wound 1.5cm in diameter, no discharge. Area pink area the wound)   Tests Performed: labs, BC x 2. Abnormal Results: .  Labs Ordered and Resulted from Time of ED Arrival to Time of ED Departure   CBC WITH PLATELETS - Abnormal       Result Value    WBC Count 5.9      RBC Count 4.12 (*)     Hemoglobin 11.7 (*)      Hematocrit 37.2 (*)     MCV 90      MCH 28.4      MCHC 31.5      RDW 13.5      Platelet Count 143 (*)    BASIC METABOLIC PANEL - Abnormal    Sodium 140      Potassium 4.3      Chloride 105      Carbon Dioxide (CO2) 29      Anion Gap 6      Urea Nitrogen 33 (*)     Creatinine 0.97      Calcium 8.9      Glucose 89      GFR Estimate 89     ERYTHROCYTE SEDIMENTATION RATE AUTO - Abnormal    Erythrocyte Sedimentation Rate 37 (*)    CRP INFLAMMATION - Abnormal    CRP Inflammation 8.5 (*)    GLUCOSE BY METER - Abnormal    GLUCOSE BY METER POCT 174 (*)    LACTIC ACID WHOLE BLOOD - Normal    Lactic Acid 1.4     COVID-19 VIRUS (CORONAVIRUS) BY PCR - Normal    SARS CoV2 PCR Negative     AEROBIC BACTERIAL CULTURE ROUTINE   BLOOD CULTURE   BLOOD CULTURE      Treatments provided: see ED meds below  Family Comments: NA  OBS brochure/video discussed/provided to patient:  N/A  ED Medications:   Medications   ceFEPIme (MAXIPIME) 2 g vial to attach to  ml bag for ADULTS or 50 ml bag for PEDS (0 g Intravenous Stopped 6/3/22 1430)   vancomycin 1500 mg in 0.9% NaCl 250 ml intermittent infusion 1,500 mg (0 mg/kg × 108.9 kg Intravenous Stopped 6/3/22 1608)     Drips infusing:  No  For the majority of the shift, the patient's behavior Green. Interventions performed were NA.    Sepsis treatment initiated: No     Patient tested for COVID 19 prior to admission: YES    ED Nurse Name/Phone Number: Cathie Vidales RN,   1:10 PM    RECEIVING UNIT ED HANDOFF REVIEW    Above ED Nurse Handoff Report was reviewed: Yes  Reviewed by: Frances Grant RN on Jossy 3, 2022 at 6:43 PM

## 2022-06-04 ENCOUNTER — PREP FOR PROCEDURE (OUTPATIENT)
Dept: PODIATRY | Facility: CLINIC | Age: 62
End: 2022-06-04
Payer: COMMERCIAL

## 2022-06-04 DIAGNOSIS — E11.42 TYPE 2 DIABETES MELLITUS WITH PERIPHERAL NEUROPATHY (H): ICD-10-CM

## 2022-06-04 DIAGNOSIS — M86.9 OSTEOMYELITIS OF GREAT TOE OF RIGHT FOOT (H): Primary | ICD-10-CM

## 2022-06-04 LAB
ANION GAP SERPL CALCULATED.3IONS-SCNC: 4 MMOL/L (ref 3–14)
BUN SERPL-MCNC: 20 MG/DL (ref 7–30)
CALCIUM SERPL-MCNC: 8.5 MG/DL (ref 8.5–10.1)
CHLORIDE BLD-SCNC: 105 MMOL/L (ref 94–109)
CO2 SERPL-SCNC: 29 MMOL/L (ref 20–32)
CREAT SERPL-MCNC: 0.79 MG/DL (ref 0.66–1.25)
ERYTHROCYTE [DISTWIDTH] IN BLOOD BY AUTOMATED COUNT: 13.5 % (ref 10–15)
GFR SERPL CREATININE-BSD FRML MDRD: >90 ML/MIN/1.73M2
GLUCOSE BLD-MCNC: 137 MG/DL (ref 70–99)
GLUCOSE BLDC GLUCOMTR-MCNC: 112 MG/DL (ref 70–99)
GLUCOSE BLDC GLUCOMTR-MCNC: 121 MG/DL (ref 70–99)
GLUCOSE BLDC GLUCOMTR-MCNC: 159 MG/DL (ref 70–99)
GLUCOSE BLDC GLUCOMTR-MCNC: 186 MG/DL (ref 70–99)
GLUCOSE BLDC GLUCOMTR-MCNC: 232 MG/DL (ref 70–99)
HCT VFR BLD AUTO: 37.3 % (ref 40–53)
HGB BLD-MCNC: 11.7 G/DL (ref 13.3–17.7)
MCH RBC QN AUTO: 28 PG (ref 26.5–33)
MCHC RBC AUTO-ENTMCNC: 31.4 G/DL (ref 31.5–36.5)
MCV RBC AUTO: 89 FL (ref 78–100)
PLATELET # BLD AUTO: 125 10E3/UL (ref 150–450)
POTASSIUM BLD-SCNC: 4 MMOL/L (ref 3.4–5.3)
RBC # BLD AUTO: 4.18 10E6/UL (ref 4.4–5.9)
SODIUM SERPL-SCNC: 138 MMOL/L (ref 133–144)
UFH PPP CHRO-ACNC: 0.55 IU/ML
UFH PPP CHRO-ACNC: 0.58 IU/ML
UFH PPP CHRO-ACNC: 0.59 IU/ML
WBC # BLD AUTO: 5.3 10E3/UL (ref 4–11)

## 2022-06-04 PROCEDURE — 250N000011 HC RX IP 250 OP 636: Performed by: HOSPITALIST

## 2022-06-04 PROCEDURE — 250N000013 HC RX MED GY IP 250 OP 250 PS 637: Performed by: HOSPITALIST

## 2022-06-04 PROCEDURE — 250N000011 HC RX IP 250 OP 636: Performed by: PODIATRIST

## 2022-06-04 PROCEDURE — 82310 ASSAY OF CALCIUM: CPT | Performed by: HOSPITALIST

## 2022-06-04 PROCEDURE — 36415 COLL VENOUS BLD VENIPUNCTURE: CPT | Performed by: INTERNAL MEDICINE

## 2022-06-04 PROCEDURE — 250N000011 HC RX IP 250 OP 636: Performed by: STUDENT IN AN ORGANIZED HEALTH CARE EDUCATION/TRAINING PROGRAM

## 2022-06-04 PROCEDURE — 99233 SBSQ HOSP IP/OBS HIGH 50: CPT | Performed by: HOSPITALIST

## 2022-06-04 PROCEDURE — 85520 HEPARIN ASSAY: CPT | Performed by: INTERNAL MEDICINE

## 2022-06-04 PROCEDURE — 85027 COMPLETE CBC AUTOMATED: CPT | Performed by: HOSPITALIST

## 2022-06-04 PROCEDURE — 85520 HEPARIN ASSAY: CPT | Performed by: HOSPITALIST

## 2022-06-04 PROCEDURE — 36415 COLL VENOUS BLD VENIPUNCTURE: CPT | Performed by: HOSPITALIST

## 2022-06-04 PROCEDURE — 258N000003 HC RX IP 258 OP 636: Performed by: HOSPITALIST

## 2022-06-04 PROCEDURE — 99254 IP/OBS CNSLTJ NEW/EST MOD 60: CPT | Mod: 57 | Performed by: PODIATRIST

## 2022-06-04 PROCEDURE — 120N000001 HC R&B MED SURG/OB

## 2022-06-04 RX ORDER — ATORVASTATIN CALCIUM 20 MG/1
20 TABLET, FILM COATED ORAL AT BEDTIME
Status: DISCONTINUED | OUTPATIENT
Start: 2022-06-04 | End: 2022-06-08 | Stop reason: HOSPADM

## 2022-06-04 RX ORDER — HEPARIN SODIUM 10000 [USP'U]/100ML
0-5000 INJECTION, SOLUTION INTRAVENOUS CONTINUOUS
Status: DISCONTINUED | OUTPATIENT
Start: 2022-06-05 | End: 2022-06-05

## 2022-06-04 RX ADMIN — FUROSEMIDE 40 MG: 40 TABLET ORAL at 08:30

## 2022-06-04 RX ADMIN — HEPARIN SODIUM AND DEXTROSE 1800 UNITS/HR: 10000; 5 INJECTION INTRAVENOUS at 23:09

## 2022-06-04 RX ADMIN — VANCOMYCIN HYDROCHLORIDE 1250 MG: 5 INJECTION, POWDER, LYOPHILIZED, FOR SOLUTION INTRAVENOUS at 14:07

## 2022-06-04 RX ADMIN — CEFEPIME HYDROCHLORIDE 1 G: 1 INJECTION, POWDER, FOR SOLUTION INTRAMUSCULAR; INTRAVENOUS at 05:28

## 2022-06-04 RX ADMIN — AMLODIPINE BESYLATE 5 MG: 5 TABLET ORAL at 22:14

## 2022-06-04 RX ADMIN — HYDROCHLOROTHIAZIDE 12.5 MG: 12.5 CAPSULE ORAL at 08:31

## 2022-06-04 RX ADMIN — ATORVASTATIN CALCIUM 20 MG: 20 TABLET, FILM COATED ORAL at 22:14

## 2022-06-04 RX ADMIN — CEFEPIME HYDROCHLORIDE 1 G: 1 INJECTION, POWDER, FOR SOLUTION INTRAMUSCULAR; INTRAVENOUS at 13:15

## 2022-06-04 RX ADMIN — INSULIN DEGLUDEC INJECTION 15 UNITS: 100 INJECTION, SOLUTION SUBCUTANEOUS at 10:33

## 2022-06-04 RX ADMIN — AMLODIPINE BESYLATE 5 MG: 5 TABLET ORAL at 08:30

## 2022-06-04 RX ADMIN — VANCOMYCIN HYDROCHLORIDE 1250 MG: 5 INJECTION, POWDER, LYOPHILIZED, FOR SOLUTION INTRAVENOUS at 02:28

## 2022-06-04 RX ADMIN — CEFEPIME HYDROCHLORIDE 2 G: 2 INJECTION, POWDER, FOR SOLUTION INTRAVENOUS at 22:14

## 2022-06-04 RX ADMIN — HEPARIN SODIUM AND DEXTROSE 1800 UNITS/HR: 10000; 5 INJECTION INTRAVENOUS at 08:28

## 2022-06-04 RX ADMIN — LISINOPRIL 40 MG: 40 TABLET ORAL at 08:31

## 2022-06-04 ASSESSMENT — ACTIVITIES OF DAILY LIVING (ADL)
ADLS_ACUITY_SCORE: 35

## 2022-06-04 NOTE — CONSULTS
Foot & Ankle Surgery  June 4, 2022    CC: right great toe infection    I was asked to see Crispin Rojas regarding the chief complaint by:  Dr. ANU Hood    HPI:  Pt is a 62 year old male who presents with above complaint.  Admission for diabetic right great toe ulcer with recent MRI results consistent with osteomyelitis.  The patient has followed with our service, having undergone previous 4th toe amputation 2018 and previous partial R great toe amputation.  He has seen Dr Molina multiple times this year for a chronic wound at the plantar aspect of the remaining R great toe.  Recent xrays concerning for infection, so an outpatient MRI was done that demonstrates osteomyelitis of the proximal phalanx.  Admission was recommended.  Admission US shows non-occlusive DVT right lower extremity PT vein, and he was started on a heparin drip.  Most recent A1c yesterday 6.6    ROS:   Pos for CC.  The patient denies current nausea, vomiting, chills, fevers, belly pain, calf pain, chest pain or SOB.  Complete remainder of ROS is otherwise neg.    VITALS:    Vitals:    06/03/22 2207 06/04/22 0016 06/04/22 0410 06/04/22 0736   BP: 136/65 (!) 151/67 119/68 121/67   BP Location: Left arm Left arm Left arm Left arm   Patient Position: Semi-Rose's Semi-Rose's     Cuff Size:  Adult Regular     Pulse:  61 56 55   Resp:  18 18 18   Temp:  98.1  F (36.7  C) 97.6  F (36.4  C) 97.9  F (36.6  C)   TempSrc:  Oral Oral Oral   SpO2: 94% 94% 95% 96%   Weight:       Height:           PMH:    Past Medical History:   Diagnosis Date     Cerebral infarction (H)     2010     Chronic infection      Hyperlipidemia LDL goal <70      Hypertension      Numbness and tingling      Obesity      GILA (obstructive sleep apnea) 10/22/2018    CPAP intolerant     PVD (peripheral vascular disease) (H)     s/p left partial toe amputation     Renal stone      Tremor      Type 2 diabetes mellitus with diabetic polyneuropathy, with long-term current use of insulin  (H)        SXHX:    Past Surgical History:   Procedure Laterality Date     AMPUTATE FOOT Left 8/18/2016    Procedure: AMPUTATE FOOT;  Surgeon: Jack Younger DPM;  Location: [facility] OR     AMPUTATE TOE(S) Right 2/1/2016    Procedure: AMPUTATE TOE(S);  Surgeon: Rachelle Manriquez DPM, Pod;  Location: [facility] OR     AMPUTATE TOE(S) Right 8/2/2019    Procedure: Right fourth toe partial amputation for treatment of osteomyelitis.;  Surgeon: Jack Younger DPM;  Location: [facility] OR     AMPUTATE TOE(S) Left 11/8/2019    Procedure: Left second toe amputation at the metatarsophalangeal joint.;  Surgeon: Rachelle Manriquez DPM, Podiatry/Foot and Ankle Surgery;  Location: [facility] OR     ANGIOGRAM       COLONOSCOPY       COMBINED CYSTOSCOPY, RETROGRADES, URETEROSCOPY, INSERT STENT Left 8/21/2016    Procedure: COMBINED CYSTOSCOPY, RETROGRADES, URETEROSCOPY, INSERT STENT;  Surgeon: Artemio Valenzuela MD;  Location: [facility] OR     COMBINED CYSTOSCOPY, RETROGRADES, URETEROSCOPY, LASER HOLMIUM LITHOTRIPSY URETER(S), INSERT STENT Left 10/3/2016    Procedure: COMBINED CYSTOSCOPY, RETROGRADES, URETEROSCOPY, LASER HOLMIUM LITHOTRIPSY URETER(S), INSERT STENT;  Surgeon: Artemio Valenzuela MD;  Location: [facility] OR     EXTRACORPOREAL SHOCK WAVE LITHOTRIPSY (ESWL) Left 9/8/2016    Procedure: EXTRACORPOREAL SHOCK WAVE LITHOTRIPSY (ESWL);  Surgeon: Artemio Valenzuela MD;  Location: [facility] OR     RECESSION GASTROCNEMIUS Right 2/1/2016    Procedure: RECESSION GASTROCNEMIUS;  Surgeon: Rachelle Manriquez DPM, Pod;  Location: [facility] OR        MEDS:    Current Facility-Administered Medications   Medication     amLODIPine (NORVASC) tablet 5 mg     atorvastatin (LIPITOR) tablet 40 mg     ceFEPIme (MAXIPIME) 1g vial to attach to  ml bag for ADULTS or NS 50 ml bag for PEDS     glucose gel 15-30 g    Or     dextrose 50 % injection 25-50 mL    Or     glucagon injection 1 mg     furosemide (LASIX) tablet 40 mg     heparin infusion 25,000 units in D5W 250 mL  ANTICOAGULANT     hydrochlorothiazide (MICROZIDE) capsule 12.5 mg     insulin aspart (NovoLOG) injection (RAPID ACTING)     insulin aspart (NovoLOG) injection (RAPID ACTING)     insulin aspart (NovoLOG) injection (RAPID ACTING)     insulin degludec (TRESIBA) 100 UNIT/ML injection 15 Units     lidocaine (LMX4) cream     lidocaine 1 % 0.1-1 mL     lisinopril (ZESTRIL) tablet 40 mg     melatonin tablet 1 mg     ondansetron (ZOFRAN ODT) ODT tab 4 mg    Or     ondansetron (ZOFRAN) injection 4 mg     sodium chloride (PF) 0.9% PF flush 3 mL     sodium chloride (PF) 0.9% PF flush 3 mL     vancomycin 1250 mg in 0.9% NaCl 250 mL intermittent infusion 1,250 mg       ALL:     Allergies   Allergen Reactions     Bactrim [Sulfamethoxazole W/Trimethoprim] Nausea and Vomiting     Sulfa Drugs Nausea     Niacin Swelling     SIMCOR caused edema in extremities     Simvastatin Swelling     SIMCOR caused edema in extremities       FMH:    Family History   Problem Relation Age of Onset     Arthritis Mother         SLE     Connective Tissue Disorder Mother         lupus     Diabetes Mother      Cerebrovascular Disease Mother      Cancer Mother      Diabetes Father      Diabetes Maternal Grandfather      Diabetes Sister        SocHx:    Social History     Socioeconomic History     Marital status:      Spouse name: Sydney     Number of children: 2     Years of education: Not on file     Highest education level: Master's degree (e.g., MA, MS, Arleth, MEd, MSW, ELIANA)   Occupational History     Occupation: marketing     Employer: [employer]     Comment: [employer]   Tobacco Use     Smoking status: Never Smoker     Smokeless tobacco: Never Used   Vaping Use     Vaping Use: Never used   Substance and Sexual Activity     Alcohol use: Yes     Alcohol/week: 0.0 standard drinks     Comment: rare---red wine 3x per month     Drug use: No     Sexual activity: Not Currently     Partners: Female   Other Topics Concern     Parent/sibling w/ CABG, MI or  angioplasty before 65F 55M? No   Social History Narrative     Not on file     Social Determinants of Health     Financial Resource Strain: Low Risk      Difficulty of Paying Living Expenses: Not very hard   Food Insecurity: No Food Insecurity     Worried About Running Out of Food in the Last Year: Never true     Ran Out of Food in the Last Year: Never true   Transportation Needs: No Transportation Needs     Lack of Transportation (Medical): No     Lack of Transportation (Non-Medical): No   Physical Activity: Sufficiently Active     Days of Exercise per Week: 4 days     Minutes of Exercise per Session: 40 min   Stress: No Stress Concern Present     Feeling of Stress : Not at all   Social Connections: Moderately Isolated     Frequency of Communication with Friends and Family: Once a week     Frequency of Social Gatherings with Friends and Family: Never     Attends Jewish Services: More than 4 times per year     Active Member of Clubs or Organizations: No     Attends Club or Organization Meetings: Not on file     Marital Status:    Intimate Partner Violence: Not on file   Housing Stability: Low Risk      Unable to Pay for Housing in the Last Year: No     Number of Places Lived in the Last Year: 2     Unstable Housing in the Last Year: No           EXAMINATION:  Gen:   No apparent distress  Neuro:   A&Ox3, no deficits  Psych:    Answering questions appropriately for age and situation with normal affect  Head:    NCAT  Eye:    Visual scanning without deficit  Ear:    Response to auditory stimuli wnl  Lung:    Non-labored breathing on RA noted  Abd:    NTND per patient report  Lymph:    Neg for pitting/non-pitting edema BLE  Vasc:    Pulses are palpable at bedside today, CFT minimally delayed  Neuro:    Light touch sensation greatly diminished distally  Derm:    Full-thickness ulceration plantar R great toe that readily probes to bone on today's exam.  Minimal surrounding erythema, and no soft tissue crepitus,  purulence or ascending cellulitis  MSK:   Previous partial R great toe amputation  Calf:    Right calf tenderness noted      Imaging:  MR R foot 6/2/22 - Impression:  1. Great toe, proximal phalanx osteomyelitis with signal abnormality  extending to the base.  *  Associated plantar ulcer and sinus tract at the level of proximal  phalangeal head.  *  Underlying maximal dimension 15 x 20 x 25 mm (mediolateral x  dorsoplantar x proximodistal) fluid accumulation around the proximal  phalangeal head. Although no thick rim enhancement, this may be  represent early abscess given suggestion of some wall formation.    -US duplex scan right lower extremity - IMPRESSION:  1.  Subocclusive DVT within the right posterior tibial vein. Otherwise no evidence of DVT within the right lower extremity.    Labs:    Component      Latest Ref Rng & Units 6/3/2022 6/4/2022   WBC      4.0 - 11.0 10e3/uL  5.3   RBC Count      4.40 - 5.90 10e6/uL  4.18 (L)   Hemoglobin      13.3 - 17.7 g/dL  11.7 (L)   Hematocrit      40.0 - 53.0 %  37.3 (L)   MCV      78 - 100 fL  89   MCH      26.5 - 33.0 pg  28.0   MCHC      31.5 - 36.5 g/dL  31.4 (L)   RDW      10.0 - 15.0 %  13.5   Platelet Count      150 - 450 10e3/uL  125 (L)   Lactic Acid      0.7 - 2.0 mmol/L 1.4    Hemoglobin A1C      0.0 - 5.6 % 6.6 (H)        Cultures:    Specimen Description Right Toe FOURTH TOE Bone SPECIMEN 1    Culture Micro  Abnormal   Light growth   Streptococcus dysgalactiae serogroup C/G     Culture Micro  Abnormal   Light growth   Staphylococcus aureus    No anaerobes isolated      Assessment:  62 year old male with osteomyelitis of the R great toe, sp previous partial toe amputation with chronic wound, in setting of DMII with peripheral neuropathy and new-found right lower extremity DVT      Plan:  Discussed etiologies, anatomy and options  1.  osteomyelitis of the R great toe, sp previous partial toe amputation with chronic wound, in setting of DMII with peripheral  neuropathy and new-found right lower extremity DVT  -I personally reviewed and interpreted the patient's lower extremity history pertinent to today's visit, including imaging/labs, in preparation for initiating a treatment program.  -to OR tomorrow for completion of right great toe amputation.  NPO midnight, surgery scheduled for 0800  -will stop heparin drip 5 hours prior to surgery, anticipate resuming 12 hours post-op.    Patient's medical history was reviewed today      Wili Quiles DPM [facility] FACFAOM  Podiatric Foot & Ankle Surgeon  [facility]  [phone]    Disclaimer: This note consists of symbols derived from keyboarding, dictation and/or voice recognition software. As a result, there may be errors in the script that have gone undetected. Please consider this when interpreting information found in this chart.

## 2022-06-04 NOTE — PLAN OF CARE
Temp: 97.9  F (36.6  C) Temp src: Oral BP: 121/67 Pulse: 55   Resp: 18 SpO2: 96 % O2 Device: None (Room air)       Orientation:  X4, doing some work on his laptop, independent in room but said he will call to go use bathroom due to heparin drip.   VS: stable   Pain:  denied   Activity:  ambulated to chair and bed   Resp:  clear LS, no SOB   GI:  had a BM this shift   : voiding independently in bathroom   Notable Labs:  Hep Xa got drawn too early, in range at this time.  RN talked to pharmacy: keep Hep drip @ 1800 Units/h and recheck at 1400. Recheck still in range, recheck @ 0600 tomorrow.   BG:: 121 and 159. PT ate a late breakfast, got his carbs covered at 1157. PT ordered lunch without letting staff know, ate it and then requested insulin. Pt stated his sensor read  prior lunch and he ate about 30g of carbs. BG per sensor at 1530 is 135. BG check and insulin will be given as scheduled by oncoming RN.   Skin:  Lower legs bahman, left big toe has wound/osteomyelistis. Right leg DVT.   Lines: PIV hand running heparin, PIV arm for antibiotic therapy, SL at this time   Other:  Surgery for right big toe amputation scheduled for tomorrow at 0800. NPO after midnight, stop Hep drip tomorrow @ 0200, resume 6/5 @ 2000. Start dextrose 5%/NS 0.45% tomorrow at  @ 0600  Plan:   surgery tomorrow, discharge TBD

## 2022-06-04 NOTE — PLAN OF CARE
VSS, A/O x 4. LS clear bilaterally on RA.  Skin intact with exception of right big toe and injury to right shin. PIV in L wrist infusing maxipime @ 200mL/hr. Had CT scan performed see lab chart for results. Heparin to start infusing after completion of maxipime. Scheduled for surgery tomorrow to remove infected big toe. Assist standby in room. Reg diet, will be NPO at midnight. Continue to monitor.

## 2022-06-04 NOTE — PLAN OF CARE
"End of shift summary- 2330-0700  Dx: Osteo R toe  A/O: Alert and Oriented x4  Diet: NPO  Fluids: Heparin cont.   Transfer: are SBA with no assistive devices   Bathroom: Voiding well   Pain: denying pain.  Telemetry Monitoring: No  Treatment: Hep level .59 - next recheck at 1130. - currently running at 1800ml, IV ABX, Podiatry    Discharge Plans: tbd        Blood pressure 119/68, pulse 56, temperature 97.6  F (36.4  C), temperature source Oral, resp. rate 18, height 1.803 m (5' 11\"), weight 115.9 kg (255 lb 8 oz), SpO2 95 %.     "

## 2022-06-04 NOTE — PROGRESS NOTES
Met with patient regarding PAP therapy. Patient is not interested in using hospital equipment.    Nathan Dillard, RT on 6/3/2022 at 9:20 PM

## 2022-06-04 NOTE — PROGRESS NOTES
Cross cover note    Ultrasound showed right leg DVT.  Start on heparin drip.    Artur Durant MD  Internal Medicine Hospitalist  Pager: [phone]

## 2022-06-04 NOTE — PROGRESS NOTES
[facility]    Hospitalist Progress Note             Date of Admission:  6/3/2022                   Day of hospitalization: 1    Assessment and Plan:   Crispin Rojas is a 62 year old male with history of type 2 diabetes myelitis, hypertension, multiple history of osteomyelitis requiring toe amputation, and left second toe amputation who presents to the hospital for evaluation of osteomyelitis of his right toe.  Patient had an MRI performed by podiatry which shows grade 2 proximal phalanx osteomyelitis extending to the base.  He was directed by podiatry to present to the ED for further evaluation and treatment for osteomyelitis and plan for surgery tomorrow.     Osteomyelitis right toe  -He was given IV cefepime along with vancomycin in the ED will continue.  -No systemic signs of symptoms  -Podiatry consult  -May consider ID consult pending results     DVT RLE  - on heparin will need to be held 5 hours prior to surgery  - Patient would like us to discuss with is neurology team about his blood thinner, as it is weekend would probably best to do on Monday.     Chronic problems:  IDDM II: Home insulin is Tresiba 55 units daily,  with NovoLog patient unsure of dosage he says he uses a pen, Ozempic along with metformin.  - Will order Tresiba 15 units, and adjust accordingly along with sliding scale and carb counting- patient states he does 7 units with 10 carb at home, will change to 2 units/10 in hospital.  HTN: Patient takes amlodipine and lisinopril has been resumed. Lasix 20 mg resumed as well, as noted to have BLE edema.  Neuropathy: continue gabapentin.  HLD: Continue atorvastatin        # Code status: Full   # Anticipated discharge date and Disposition:2-3 days  # DVT: SCDS  # IVF: no                    Paty Hood MD  Text Page (7am - 6pm, M-F)               Subjective   Chief Complaint:  Foot ulcer  Subjective: doing ok, minimal complaints.No chest pain, sob, fevers, chills, coughs,  "nausea, vomiting, or abdominal pain       Objective   /52 (BP Location: Left arm)   Pulse 61   Temp 97.9  F (36.6  C) (Oral)   Resp 18   Ht 1.803 m (5' 11\")   Wt 115.9 kg (255 lb 8 oz)   SpO2 96%   BMI 35.64 kg/m       Physical Exam  General: Pt in NAD, normal appearance  HEENT: OP clear MMM, no JVD  Lungs: Clear to Auscultation Bilateral, normal breathing  without accessory muscle usage, no wheezing, rhonchi or crackles  Cardiac: +S1, S2, RRR, no MRG, no edema  Abdominal: normal bowel sounds, NT/ND, no hepatosplenomegaly  Skin: warm, dry, normal turgor, no rash  Psyche: A& O x3, appropriate affect             Intake/Output Summary (Last 24 hours) at 6/4/2022 1205  Last data filed at 6/4/2022 1116  Gross per 24 hour   Intake 490 ml   Output --   Net 490 ml           Labs and Imaging Results:      Recent Labs   Lab 06/04/22  0806 06/03/22  2218   WBC 5.3 5.4   HGB 11.7* 11.2*   * 135*        Recent Labs   Lab 06/04/22  0806 06/03/22  1203    140   CO2 29 29   BUN 20 33*      No results for input(s): INR, PTT in the last 168 hours.   No results for input(s): CKMB in the last 168 hours.    Invalid input(s): TROPONINT   No results for input(s): ALBUMIN, AST, ALT, ALKPHOS, BILITOT in the last 168 hours.     Micro:     Radio:  US Lower Extremity Venous Duplex Right   Final Result   IMPRESSION:   1.  Subocclusive DVT within the right posterior tibial vein. Otherwise no evidence of DVT within the right lower extremity.              Medications:      Scheduled Meds:      amLODIPine  5 mg Oral BID     atorvastatin  40 mg Oral Daily     ceFEPIme (MAXIPIME) IV  1 g Intravenous Q8H     furosemide  40 mg Oral Daily     hydrochlorothiazide  12.5 mg Oral QAM     insulin aspart  1-3 Units Subcutaneous TID AC     insulin aspart  1-3 Units Subcutaneous At Bedtime     insulin aspart   Subcutaneous TID AC     insulin degludec  15 Units Subcutaneous QAM     lisinopril  40 mg Oral Daily     sodium chloride (PF)  " 3 mL Intracatheter Q8H     vancomycin  1,250 mg Intravenous Q12H     Continuous Infusions:      [START ON 6/5/2022] dextrose 5% and 0.45% NaCl       heparin 1,800 Units/hr (06/04/22 0828)     PRN Meds:  glucose **OR** dextrose **OR** glucagon, HOLD MEDICATION (one time), lidocaine 4%, lidocaine (buffered or not buffered), melatonin, ondansetron **OR** ondansetron, sodium chloride (PF)

## 2022-06-04 NOTE — PHARMACY-VANCOMYCIN DOSING SERVICE
Pharmacy Vancomycin Initial Note  Date of Service Jossy 3, 2022  Patient's  1960  62 year old, male    Indication: Osteomyelitis, SSTI    Current estimated CrCl = Estimated Creatinine Clearance: 104.8 mL/min (based on SCr of 0.97 mg/dL).    Creatinine for last 3 days  6/3/2022: 12:03 PM Creatinine 0.97 mg/dL    Recent Vancomycin Level(s) for last 3 days  No results found for requested labs within last 72 hours.      Vancomycin IV Administrations (past 72 hours)                   vancomycin 1500 mg in 0.9% NaCl 250 ml intermittent infusion 1,500 mg (mg) 1,500 mg New Bag 22 1438                Nephrotoxins and other renal medications (From now, onward)    Start     Dose/Rate Route Frequency Ordered Stop    22 0900  furosemide (LASIX) tablet 40 mg         40 mg Oral DAILY 22 0900  lisinopril (ZESTRIL) tablet 40 mg         40 mg Oral DAILY 22 0200  vancomycin 1250 mg in 0.9% NaCl 250 mL intermittent infusion 1,250 mg         1,250 mg  over 90 Minutes Intravenous EVERY 12 HOURS 22            Contrast Orders - past 72 hours (72h ago, onward)    None          InsightRX Prediction of Planned Initial Vancomycin Regimen  Loading dose: 1500 mg x1  Regimen: 1250 mg IV every 12 hours.  Start time: 02:38 on 2022  Exposure target: AUC24 (range)400-600 mg/L.hr   AUC24,ss: 502 mg/L.hr  Probability of AUC24 > 400: 74 %  Ctrough,ss: 16.4 mg/L  Probability of Ctrough,ss > 20: 33 %  Probability of nephrotoxicity (Lodise LYNN ): 12 %          Plan:  1. Start vancomycin  1250 mg IV q12h.   2. Vancomycin monitoring method: AUC  3. Vancomycin therapeutic monitoring goal: 400-600 mg*h/L  4. Pharmacy will check vancomycin levels as appropriate in 1-3 Days.    5. Serum creatinine levels will be ordered daily for the first week of therapy and at least twice weekly for subsequent weeks.      Jaime Sylvester [facility]

## 2022-06-05 ENCOUNTER — ANESTHESIA (OUTPATIENT)
Dept: SURGERY | Facility: CLINIC | Age: 62
End: 2022-06-05
Payer: COMMERCIAL

## 2022-06-05 ENCOUNTER — APPOINTMENT (OUTPATIENT)
Dept: GENERAL RADIOLOGY | Facility: CLINIC | Age: 62
End: 2022-06-05
Attending: PODIATRIST
Payer: COMMERCIAL

## 2022-06-05 ENCOUNTER — ANESTHESIA EVENT (OUTPATIENT)
Dept: SURGERY | Facility: CLINIC | Age: 62
End: 2022-06-05
Payer: COMMERCIAL

## 2022-06-05 LAB
ERYTHROCYTE [DISTWIDTH] IN BLOOD BY AUTOMATED COUNT: 13.4 % (ref 10–15)
GLUCOSE BLDC GLUCOMTR-MCNC: 120 MG/DL (ref 70–99)
GLUCOSE BLDC GLUCOMTR-MCNC: 128 MG/DL (ref 70–99)
GLUCOSE BLDC GLUCOMTR-MCNC: 148 MG/DL (ref 70–99)
GLUCOSE BLDC GLUCOMTR-MCNC: 162 MG/DL (ref 70–99)
GLUCOSE BLDC GLUCOMTR-MCNC: 189 MG/DL (ref 70–99)
GLUCOSE BLDC GLUCOMTR-MCNC: 192 MG/DL (ref 70–99)
GLUCOSE BLDC GLUCOMTR-MCNC: 208 MG/DL (ref 70–99)
GLUCOSE BLDC GLUCOMTR-MCNC: 214 MG/DL (ref 70–99)
HCT VFR BLD AUTO: 36.6 % (ref 40–53)
HGB BLD-MCNC: 11.6 G/DL (ref 13.3–17.7)
HOLD SPECIMEN: NORMAL
HOLD SPECIMEN: NORMAL
MCH RBC QN AUTO: 28.5 PG (ref 26.5–33)
MCHC RBC AUTO-ENTMCNC: 31.7 G/DL (ref 31.5–36.5)
MCV RBC AUTO: 90 FL (ref 78–100)
PLATELET # BLD AUTO: 123 10E3/UL (ref 150–450)
RBC # BLD AUTO: 4.07 10E6/UL (ref 4.4–5.9)
UFH PPP CHRO-ACNC: <0.1 IU/ML
VANCOMYCIN SERPL-MCNC: 15.7 MG/L
WBC # BLD AUTO: 4.9 10E3/UL (ref 4–11)

## 2022-06-05 PROCEDURE — 250N000013 HC RX MED GY IP 250 OP 250 PS 637: Performed by: PODIATRIST

## 2022-06-05 PROCEDURE — 87077 CULTURE AEROBIC IDENTIFY: CPT | Performed by: PODIATRIST

## 2022-06-05 PROCEDURE — 14040 TIS TRNFR F/C/C/M/N/A/G/H/F: CPT | Mod: T5 | Performed by: PODIATRIST

## 2022-06-05 PROCEDURE — 250N000011 HC RX IP 250 OP 636: Performed by: NURSE ANESTHETIST, CERTIFIED REGISTERED

## 2022-06-05 PROCEDURE — 360N000075 HC SURGERY LEVEL 2, PER MIN: Performed by: PODIATRIST

## 2022-06-05 PROCEDURE — 258N000001 HC RX 258: Performed by: PODIATRIST

## 2022-06-05 PROCEDURE — 250N000011 HC RX IP 250 OP 636: Performed by: PODIATRIST

## 2022-06-05 PROCEDURE — 99233 SBSQ HOSP IP/OBS HIGH 50: CPT | Performed by: HOSPITALIST

## 2022-06-05 PROCEDURE — 87075 CULTR BACTERIA EXCEPT BLOOD: CPT | Performed by: PODIATRIST

## 2022-06-05 PROCEDURE — 80202 ASSAY OF VANCOMYCIN: CPT | Performed by: HOSPITALIST

## 2022-06-05 PROCEDURE — 36415 COLL VENOUS BLD VENIPUNCTURE: CPT | Performed by: HOSPITALIST

## 2022-06-05 PROCEDURE — 250N000009 HC RX 250: Performed by: ANESTHESIOLOGY

## 2022-06-05 PROCEDURE — 370N000017 HC ANESTHESIA TECHNICAL FEE, PER MIN: Performed by: PODIATRIST

## 2022-06-05 PROCEDURE — 999N000065 XR FOOT RIGHT 2 VIEWS: Mod: RT

## 2022-06-05 PROCEDURE — 85027 COMPLETE CBC AUTOMATED: CPT | Performed by: HOSPITALIST

## 2022-06-05 PROCEDURE — 88305 TISSUE EXAM BY PATHOLOGIST: CPT | Mod: TC | Performed by: PODIATRIST

## 2022-06-05 PROCEDURE — 258N000003 HC RX IP 258 OP 636: Performed by: HOSPITALIST

## 2022-06-05 PROCEDURE — 258N000003 HC RX IP 258 OP 636: Performed by: PODIATRIST

## 2022-06-05 PROCEDURE — 85520 HEPARIN ASSAY: CPT | Performed by: HOSPITALIST

## 2022-06-05 PROCEDURE — 0Y6P0Z0 DETACHMENT AT RIGHT 1ST TOE, COMPLETE, OPEN APPROACH: ICD-10-PCS | Performed by: PODIATRIST

## 2022-06-05 PROCEDURE — 258N000003 HC RX IP 258 OP 636: Performed by: ANESTHESIOLOGY

## 2022-06-05 PROCEDURE — 272N000001 HC OR GENERAL SUPPLY STERILE: Performed by: PODIATRIST

## 2022-06-05 PROCEDURE — 710N000012 HC RECOVERY PHASE 2, PER MINUTE: Performed by: PODIATRIST

## 2022-06-05 PROCEDURE — 88305 TISSUE EXAM BY PATHOLOGIST: CPT | Mod: 26 | Performed by: PATHOLOGY

## 2022-06-05 PROCEDURE — 88311 DECALCIFY TISSUE: CPT | Mod: 26 | Performed by: PATHOLOGY

## 2022-06-05 PROCEDURE — 120N000001 HC R&B MED SURG/OB

## 2022-06-05 PROCEDURE — 88304 TISSUE EXAM BY PATHOLOGIST: CPT | Mod: 26 | Performed by: PATHOLOGY

## 2022-06-05 PROCEDURE — 28820 AMPUTATION OF TOE: CPT | Mod: 59 | Performed by: PODIATRIST

## 2022-06-05 PROCEDURE — 88311 DECALCIFY TISSUE: CPT | Mod: TC | Performed by: PODIATRIST

## 2022-06-05 PROCEDURE — 999N000141 HC STATISTIC PRE-PROCEDURE NURSING ASSESSMENT: Performed by: PODIATRIST

## 2022-06-05 PROCEDURE — 250N000011 HC RX IP 250 OP 636: Performed by: HOSPITALIST

## 2022-06-05 RX ORDER — LABETALOL HYDROCHLORIDE 5 MG/ML
10 INJECTION, SOLUTION INTRAVENOUS
Status: DISCONTINUED | OUTPATIENT
Start: 2022-06-05 | End: 2022-06-05 | Stop reason: HOSPADM

## 2022-06-05 RX ORDER — NALOXONE HYDROCHLORIDE 0.4 MG/ML
0.4 INJECTION, SOLUTION INTRAMUSCULAR; INTRAVENOUS; SUBCUTANEOUS
Status: DISCONTINUED | OUTPATIENT
Start: 2022-06-05 | End: 2022-06-08 | Stop reason: HOSPADM

## 2022-06-05 RX ORDER — ONDANSETRON 2 MG/ML
4 INJECTION INTRAMUSCULAR; INTRAVENOUS EVERY 30 MIN PRN
Status: DISCONTINUED | OUTPATIENT
Start: 2022-06-05 | End: 2022-06-05 | Stop reason: HOSPADM

## 2022-06-05 RX ORDER — OXYCODONE HYDROCHLORIDE 5 MG/1
5 TABLET ORAL EVERY 4 HOURS PRN
Status: DISCONTINUED | OUTPATIENT
Start: 2022-06-05 | End: 2022-06-08 | Stop reason: HOSPADM

## 2022-06-05 RX ORDER — ACETAMINOPHEN 325 MG/1
325 TABLET ORAL EVERY 4 HOURS PRN
Status: DISCONTINUED | OUTPATIENT
Start: 2022-06-05 | End: 2022-06-08 | Stop reason: HOSPADM

## 2022-06-05 RX ORDER — NALOXONE HYDROCHLORIDE 0.4 MG/ML
0.2 INJECTION, SOLUTION INTRAMUSCULAR; INTRAVENOUS; SUBCUTANEOUS
Status: DISCONTINUED | OUTPATIENT
Start: 2022-06-05 | End: 2022-06-08 | Stop reason: HOSPADM

## 2022-06-05 RX ORDER — ONDANSETRON 4 MG/1
4 TABLET, ORALLY DISINTEGRATING ORAL EVERY 30 MIN PRN
Status: DISCONTINUED | OUTPATIENT
Start: 2022-06-05 | End: 2022-06-05 | Stop reason: HOSPADM

## 2022-06-05 RX ORDER — HYDROMORPHONE HYDROCHLORIDE 1 MG/ML
0.5 INJECTION, SOLUTION INTRAMUSCULAR; INTRAVENOUS; SUBCUTANEOUS EVERY 5 MIN PRN
Status: DISCONTINUED | OUTPATIENT
Start: 2022-06-05 | End: 2022-06-05 | Stop reason: HOSPADM

## 2022-06-05 RX ORDER — OXYCODONE HYDROCHLORIDE 5 MG/1
5 TABLET ORAL EVERY 4 HOURS PRN
Status: DISCONTINUED | OUTPATIENT
Start: 2022-06-05 | End: 2022-06-05 | Stop reason: HOSPADM

## 2022-06-05 RX ORDER — ONDANSETRON 2 MG/ML
INJECTION INTRAMUSCULAR; INTRAVENOUS PRN
Status: DISCONTINUED | OUTPATIENT
Start: 2022-06-05 | End: 2022-06-05

## 2022-06-05 RX ORDER — PROPOFOL 10 MG/ML
INJECTION, EMULSION INTRAVENOUS PRN
Status: DISCONTINUED | OUTPATIENT
Start: 2022-06-05 | End: 2022-06-05

## 2022-06-05 RX ORDER — HYDRALAZINE HYDROCHLORIDE 20 MG/ML
5-10 INJECTION INTRAMUSCULAR; INTRAVENOUS EVERY 10 MIN PRN
Status: DISCONTINUED | OUTPATIENT
Start: 2022-06-05 | End: 2022-06-05 | Stop reason: HOSPADM

## 2022-06-05 RX ORDER — FENTANYL CITRATE 50 UG/ML
INJECTION, SOLUTION INTRAMUSCULAR; INTRAVENOUS PRN
Status: DISCONTINUED | OUTPATIENT
Start: 2022-06-05 | End: 2022-06-05

## 2022-06-05 RX ORDER — LIDOCAINE 40 MG/G
CREAM TOPICAL
Status: DISCONTINUED | OUTPATIENT
Start: 2022-06-05 | End: 2022-06-05 | Stop reason: HOSPADM

## 2022-06-05 RX ORDER — SODIUM CHLORIDE, SODIUM LACTATE, POTASSIUM CHLORIDE, CALCIUM CHLORIDE 600; 310; 30; 20 MG/100ML; MG/100ML; MG/100ML; MG/100ML
INJECTION, SOLUTION INTRAVENOUS CONTINUOUS
Status: DISCONTINUED | OUTPATIENT
Start: 2022-06-05 | End: 2022-06-05 | Stop reason: HOSPADM

## 2022-06-05 RX ORDER — MEPERIDINE HYDROCHLORIDE 25 MG/ML
12.5 INJECTION INTRAMUSCULAR; INTRAVENOUS; SUBCUTANEOUS
Status: DISCONTINUED | OUTPATIENT
Start: 2022-06-05 | End: 2022-06-05 | Stop reason: HOSPADM

## 2022-06-05 RX ORDER — ALBUTEROL SULFATE 0.83 MG/ML
2.5 SOLUTION RESPIRATORY (INHALATION) EVERY 4 HOURS PRN
Status: DISCONTINUED | OUTPATIENT
Start: 2022-06-05 | End: 2022-06-05 | Stop reason: HOSPADM

## 2022-06-05 RX ORDER — PROPOFOL 10 MG/ML
INJECTION, EMULSION INTRAVENOUS CONTINUOUS PRN
Status: DISCONTINUED | OUTPATIENT
Start: 2022-06-05 | End: 2022-06-05

## 2022-06-05 RX ORDER — FENTANYL CITRATE 50 UG/ML
50 INJECTION, SOLUTION INTRAMUSCULAR; INTRAVENOUS EVERY 5 MIN PRN
Status: DISCONTINUED | OUTPATIENT
Start: 2022-06-05 | End: 2022-06-05 | Stop reason: HOSPADM

## 2022-06-05 RX ORDER — FENTANYL CITRATE 50 UG/ML
25 INJECTION, SOLUTION INTRAMUSCULAR; INTRAVENOUS
Status: DISCONTINUED | OUTPATIENT
Start: 2022-06-05 | End: 2022-06-05 | Stop reason: HOSPADM

## 2022-06-05 RX ORDER — BUPIVACAINE HYDROCHLORIDE 2.5 MG/ML
INJECTION, SOLUTION EPIDURAL; INFILTRATION; INTRACAUDAL PRN
Status: DISCONTINUED | OUTPATIENT
Start: 2022-06-05 | End: 2022-06-05 | Stop reason: HOSPADM

## 2022-06-05 RX ORDER — LIDOCAINE 40 MG/G
CREAM TOPICAL
Status: DISCONTINUED | OUTPATIENT
Start: 2022-06-05 | End: 2022-06-08 | Stop reason: HOSPADM

## 2022-06-05 RX ORDER — LORAZEPAM 2 MG/ML
.5-1 INJECTION INTRAMUSCULAR
Status: DISCONTINUED | OUTPATIENT
Start: 2022-06-05 | End: 2022-06-05 | Stop reason: HOSPADM

## 2022-06-05 RX ORDER — HEPARIN SODIUM 10000 [USP'U]/100ML
0-5000 INJECTION, SOLUTION INTRAVENOUS CONTINUOUS
Status: DISCONTINUED | OUTPATIENT
Start: 2022-06-05 | End: 2022-06-07

## 2022-06-05 RX ADMIN — SODIUM CHLORIDE, POTASSIUM CHLORIDE, SODIUM LACTATE AND CALCIUM CHLORIDE: 600; 310; 30; 20 INJECTION, SOLUTION INTRAVENOUS at 07:49

## 2022-06-05 RX ADMIN — LIDOCAINE HYDROCHLORIDE 50 MG: 10 INJECTION, SOLUTION EPIDURAL; INFILTRATION; INTRACAUDAL; PERINEURAL at 07:55

## 2022-06-05 RX ADMIN — MIDAZOLAM 2 MG: 1 INJECTION INTRAMUSCULAR; INTRAVENOUS at 07:49

## 2022-06-05 RX ADMIN — ONDANSETRON HYDROCHLORIDE 4 MG: 2 INJECTION, SOLUTION INTRAVENOUS at 08:33

## 2022-06-05 RX ADMIN — PROPOFOL 100 MCG/KG/MIN: 10 INJECTION, EMULSION INTRAVENOUS at 07:55

## 2022-06-05 RX ADMIN — FENTANYL CITRATE 50 MCG: 50 INJECTION, SOLUTION INTRAMUSCULAR; INTRAVENOUS at 08:09

## 2022-06-05 RX ADMIN — VANCOMYCIN HYDROCHLORIDE 1250 MG: 5 INJECTION, POWDER, LYOPHILIZED, FOR SOLUTION INTRAVENOUS at 02:12

## 2022-06-05 RX ADMIN — PROPOFOL 30 MG: 10 INJECTION, EMULSION INTRAVENOUS at 07:55

## 2022-06-05 RX ADMIN — CEFEPIME HYDROCHLORIDE 2 G: 2 INJECTION, POWDER, FOR SOLUTION INTRAVENOUS at 22:33

## 2022-06-05 RX ADMIN — CEFEPIME HYDROCHLORIDE 2 G: 2 INJECTION, POWDER, FOR SOLUTION INTRAVENOUS at 04:10

## 2022-06-05 RX ADMIN — ATORVASTATIN CALCIUM 20 MG: 20 TABLET, FILM COATED ORAL at 22:32

## 2022-06-05 RX ADMIN — HEPARIN SODIUM AND DEXTROSE 1800 UNITS/HR: 10000; 5 INJECTION INTRAVENOUS at 23:55

## 2022-06-05 RX ADMIN — AMLODIPINE BESYLATE 5 MG: 5 TABLET ORAL at 22:32

## 2022-06-05 RX ADMIN — VANCOMYCIN HYDROCHLORIDE 1250 MG: 5 INJECTION, POWDER, LYOPHILIZED, FOR SOLUTION INTRAVENOUS at 15:05

## 2022-06-05 RX ADMIN — DEXTROSE AND SODIUM CHLORIDE: 5; 450 INJECTION, SOLUTION INTRAVENOUS at 05:55

## 2022-06-05 RX ADMIN — CEFEPIME HYDROCHLORIDE 2 G: 2 INJECTION, POWDER, FOR SOLUTION INTRAVENOUS at 14:04

## 2022-06-05 ASSESSMENT — ACTIVITIES OF DAILY LIVING (ADL)
ADLS_ACUITY_SCORE: 39
ADLS_ACUITY_SCORE: 37
ADLS_ACUITY_SCORE: 35
ADLS_ACUITY_SCORE: 35
ADLS_ACUITY_SCORE: 39
ADLS_ACUITY_SCORE: 35
ADLS_ACUITY_SCORE: 39
ADLS_ACUITY_SCORE: 35

## 2022-06-05 NOTE — ANESTHESIA CARE TRANSFER NOTE
Patient: Crispin Rojas    Procedure: Procedure(s):  AMPUTATION OF RIGHT GREAT TOE       Diagnosis: Osteomyelitis of great toe of right foot (H) [M86.9]  Type 2 diabetes mellitus with peripheral neuropathy (H) [E11.42]  Diagnosis Additional Information: No value filed.    Anesthesia Type:   MAC     Note:    Oropharynx: oropharynx clear of all foreign objects and spontaneously breathing  Level of Consciousness: awake and drowsy  Oxygen Supplementation: face mask  Level of Supplemental Oxygen (L/min / FiO2): 8  Independent Airway: airway patency satisfactory and stable  Dentition: dentition unchanged  Vital Signs Stable: post-procedure vital signs reviewed and stable  Report to RN Given: handoff report given  Patient transferred to: Phase II  Comments: Pt responding to verbal commands.  Report to RN.  Handoff Report: Identifed the Patient, Identified the Reponsible Provider, Reviewed the pertinent medical history, Discussed the surgical course, Reviewed Intra-OP anesthesia mangement and issues during anesthesia, Set expectations for post-procedure period and Allowed opportunity for questions and acknowledgement of understanding      Vitals:  Vitals Value Taken Time   BP     Temp     Pulse     Resp     SpO2 100 % 06/05/22 0839   Vitals shown include unvalidated device data.    Electronically Signed By: SARAH Mendez CRNA  June 5, 2022  8:40 AM

## 2022-06-05 NOTE — PHARMACY-VANCOMYCIN DOSING SERVICE
Pharmacy Vancomycin Note  Date of Service 2022  Patient's  1960   62 year old, male    Indication: Osteomyelitis; SSTI  Day of Therapy: 3  Current vancomycin regimen:  1250 mg IV q12h  Current vancomycin monitoring method: AUC  Current vancomycin therapeutic monitoring goal: 400-600 mg*h/L    InsightRX Prediction of Current Vancomycin Regimen  Regimen: 1250 mg IV every 12 hours.  Exposure target: AUC24 (range)400-600 mg/L.hr   AUC24,ss: 436 mg/L.hr  Probability of AUC24 > 400: 66 %  Ctrough,ss: 13.6 mg/L  Probability of Ctrough,ss > 20: 11 %  Probability of nephrotoxicity (Lodise LYNN ): 9 %      Current estimated CrCl = Estimated Creatinine Clearance: 126.2 mL/min (based on SCr of 0.79 mg/dL).    Creatinine for last 3 days  6/3/2022: 12:03 PM Creatinine 0.97 mg/dL  2022:  8:06 AM Creatinine 0.79 mg/dL    Recent Vancomycin Levels (past 3 days)  2022:  9:10 AM Vancomycin 15.7 mg/L        Nephrotoxins and other renal medications (From now, onward)    Start     Dose/Rate Route Frequency Ordered Stop    22 0900  furosemide (LASIX) tablet 40 mg         40 mg Oral DAILY 22 0900  lisinopril (ZESTRIL) tablet 40 mg         40 mg Oral DAILY 22               Contrast Orders - past 72 hours (72h ago, onward)    None          Interpretation of levels and current regimen:  Vancomycin level is reflective of -600  Has serum creatinine changed greater than 50% in last 72 hours: No  Urine output:  good urine output  Renal Function: Stable    Plan:  1. Continue Current Dose  2. Vancomycin monitoring method: AUC  3. Vancomycin therapeutic monitoring goal: 400-600 mg*h/L  4. Pharmacy will check vancomycin levels as appropriate in 5-7 Days.  5. Serum creatinine levels will be ordered a minimum of twice weekly.    Stef Dsouza [facility]

## 2022-06-05 NOTE — PLAN OF CARE
Temp: 98  F (36.7  C) Temp src: Oral BP: 130/61 Pulse: 54   Resp: 18 SpO2: 94 % O2 Device: Nasal cannula Oxygen Delivery: 2 LPM     Orientation:  x4, came back from surgery around 1000, awake and hungry. Ordered food right a way   VS: bradycardic, BP stable   Pain:  denied any pain  Activity:  ambulated to bathroom post surgery to urinate   Resp:   WDL on CAPNO for 24h, IPI between 8 and 10.   GI:  WDL  : urinated post surgery  Notable Labs:   post surgery, 214 prior lunch at 1417. His insulin schedule is off due to surgery and late meals   Skin: dressing shows some bright red blood, marked the dressing for measurement.    Lines: PIV left on hand: 5% dextrose / 0.45% NaCl at 125ml/h. Inserted second PIV left upper arm for antibiotics. Heparin drip still paused during this shift   Plan:   continue with plan of care.    Other

## 2022-06-05 NOTE — BRIEF OP NOTE
"[facility]    Brief Operative Note    Pre-operative diagnosis: Osteomyelitis of great toe of right foot (H) [M86.9]  Type 2 diabetes mellitus with peripheral neuropathy (H) [E11.42]  Post-operative diagnosis sp R great toe amputation with full closure    Procedure: Procedure(s):  AMPUTATION OF RIGHT GREAT TOE  Surgeon: Surgeon(s) and Role:     * Wili Quiles DPM - Primary  Anesthesia: Monitor Anesthesia Care   Estimated Blood Loss: Less than 10 ml    Drains: 1/4\" penrose  Specimens:   ID Type Source Tests Collected by Time Destination   1 : Right Great Toe Amputation, Non-Traumatic Toe, Right SURGICAL PATHOLOGY EXAM Wili Quiles DPM 6/5/2022  8:15 AM    2 : Bone Right Great Toe Bone Biopsy Toe, Right SURGICAL PATHOLOGY EXAM Wili Quiles DPM 6/5/2022  8:15 AM    A : Bone Right Great Toe Bone Curetting Toe, Right ANAEROBIC BACTERIAL CULTURE ROUTINE, AEROBIC BACTERIAL CULTURE ROUTINE Wili Quiles DPM 6/5/2022  8:16 AM      Findings:   healthy tissue at base of wound without SOI.  Complications: None.  Implants: * No implants in log *    All bone infection resected, full closure of wound, no further surgery planned.  Anticipate 1-2 days of monitoring before ok to discharge.     Resume heparin 8:00PM    "

## 2022-06-05 NOTE — PROGRESS NOTES
[facility]    Hospitalist Progress Note             Date of Admission:  6/3/2022                   Day of hospitalization: 2    Assessment and Plan:   Crispin Rojas is a 62 year old male with history of type 2 diabetes myelitis, hypertension, multiple history of osteomyelitis requiring toe amputation, and left second toe amputation who presents to the hospital for evaluation of osteomyelitis of his right toe.  Patient had an MRI performed by podiatry which shows grade 2 proximal phalanx osteomyelitis extending to the base.  He was directed by podiatry to present to the ED for further evaluation and treatment for osteomyelitis and plan for surgery tomorrow.     Osteomyelitis right toe  -He was given IV cefepime along with vancomycin in the ED will continue.  -No systemic signs of symptoms  -Podiatry consult, status post amputation, biopsy and cultures pending  -May consider ID consult pending results     DVT RLE  -Patient was on heparin which was held 5 hours before surgery we will resume 12 hours after surgery today in the evening  - Patient would like us to discuss with is neurology team about his blood thinner, as it is weekend would probably best to do on Monday.  Per care everywhere patient saw ANGELINE Jarquin 1/7/222 last, sees Dr. Rowland  - recommend at least 3 months, should follow up oncology for duration as risk factors not clear. Continue routine cancer screening    Chronic problems:  IDDM II: Home insulin is Tresiba 55 units daily,  with NovoLog patient unsure of dosage he says he uses a pen, Ozempic along with metformin.  - change Tresiba 15 units, to 20 units tomorrow, and adjust accordingly along with sliding scale and carb counting- patient states he does 7 units with 10 carb at home, will change to 2 units/10 in hospital.  HTN: Patient takes amlodipine and lisinopril has been resumed. Lasix 20 mg resumed as well, as noted to have BLE edema.  Neuropathy: continue  "gabapentin.  HLD: Continue atorvastatin  GILA  CVA     # Code status: Full   # Anticipated discharge date and Disposition:2-3 days  # DVT: SCDS  # IVF: no                    Ani ESTRELLA Hood MD  Text Page (7am - 6pm, M-F)               Subjective   Chief Complaint:  Foot ulcer  Subjective: doing ok, minimal complaints.No chest pain, sob, fevers, chills, coughs, nausea, vomiting, or abdominal pain       Objective   /55 (BP Location: Right arm)   Pulse 50   Temp 98  F (36.7  C) (Oral)   Resp 18   Ht 1.803 m (5' 11\")   Wt 111.4 kg (245 lb 9.6 oz)   SpO2 99%   BMI 34.25 kg/m       Physical Exam  General: Pt in NAD, normal appearance  HEENT: OP clear MMM, no JVD  Lungs: Clear to Auscultation Bilateral, normal breathing  without accessory muscle usage, no wheezing, rhonchi or crackles  Cardiac: +S1, S2, RRR, no MRG, no edema  Abdominal: normal bowel sounds, NT/ND, no hepatosplenomegaly  Skin: warm, dry, normal turgor, no rash  Psyche: A& O x3, appropriate affect             Intake/Output Summary (Last 24 hours) at 6/4/2022 1205  Last data filed at 6/4/2022 1116  Gross per 24 hour   Intake 490 ml   Output --   Net 490 ml           Labs and Imaging Results:      Recent Labs   Lab 06/04/22  0806 06/03/22  2218   WBC 5.3 5.4   HGB 11.7* 11.2*   * 135*        Recent Labs   Lab 06/04/22  0806 06/03/22  1203    140   CO2 29 29   BUN 20 33*      No results for input(s): INR, PTT in the last 168 hours.   No results for input(s): CKMB in the last 168 hours.    Invalid input(s): TROPONINT   No results for input(s): ALBUMIN, AST, ALT, ALKPHOS, BILITOT in the last 168 hours.     Micro:     Radio:  XR Foot Right 2 Views   Final Result   IMPRESSION: Postoperative change amputation of the great toe. Postoperative change amputation of the distal aspect of the fourth toe. No radiographic evidence for osteomyelitis. Hammertoe deformities. Plantar and Achilles calcaneal spurring.         US Lower Extremity Venous Duplex " Right   Final Result   IMPRESSION:   1.  Subocclusive DVT within the right posterior tibial vein. Otherwise no evidence of DVT within the right lower extremity.              Medications:      Scheduled Meds:      amLODIPine  5 mg Oral BID     atorvastatin  20 mg Oral At Bedtime     ceFEPIme (MAXIPIME) IV  2 g Intravenous Q8H     furosemide  40 mg Oral Daily     hydrochlorothiazide  12.5 mg Oral QAM     insulin aspart  1-3 Units Subcutaneous TID AC     insulin aspart  1-3 Units Subcutaneous At Bedtime     insulin aspart   Subcutaneous TID AC     insulin degludec  15 Units Subcutaneous QAM     lisinopril  40 mg Oral Daily     sodium chloride (PF)  3 mL Intracatheter Q8H     sodium chloride (PF)  3 mL Intracatheter Q8H     vancomycin  1,250 mg Intravenous Q12H     Continuous Infusions:      dextrose 5% and 0.45% NaCl 125 mL/hr (06/05/22 1128)     heparin       heparin       PRN Meds:  acetaminophen, glucose **OR** dextrose **OR** glucagon, HOLD MEDICATION (one time), lidocaine 4%, lidocaine 4%, lidocaine (buffered or not buffered), lidocaine (buffered or not buffered), melatonin, naloxone **OR** naloxone **OR** naloxone **OR** naloxone, ondansetron **OR** ondansetron, oxyCODONE, sodium chloride (PF), sodium chloride (PF)

## 2022-06-05 NOTE — OP NOTE
Procedure Date: 06/05/2022    SURGEON:  Wili Quiles DPM    PREOPERATIVE DIAGNOSES:     1.  Diabetes mellitus with peripheral neuropathy.  2.  Previous partial right great toe amputation.  3.  Osteomyelitis, right great toe proximal phalanx.    POSTOPERATIVE DIAGNOSES:     1.  Diabetes mellitus with peripheral neuropathy.  2.  Previous partial right great toe amputation.  2.  Osteomyelitis, right great toe proximal phalanx.    PROCEDURES:    1.  Right great toe amputation with disarticulation at the first metatarsophalangeal joint.  2.  A 5 x 3 cm flap closure of the wound.    PATHOLOGY:     1.  Specimen of bone was sent off for histological evaluation.  2.  Specimen of bone was sent off for aerobic/anaerobic cultures.  3.  Right great toe was sent for gross evaluation.    ANESTHESIA:  MAC with local.    HEMOSTASIS:  None.    ESTIMATED BLOOD LOSS:  Less than 5 mL.    MATERIALS:  1/4-inch Penrose drain x 1.    INJECTABLES:  17 mL of 0.25% bupivacaine plain in Oswald block fashion.    COMPLICATIONS:  None apparent.    INDICATIONS FOR PROCEDURE:  The patient is a pleasant 62-year-old neuropathic diabetic male who was admitted for worsening infection of the right great toe.  He previously had the distal aspect of the toe removed, but has been dealing with a chronic ulcer at the plantar right great toe distal stump.  He had seen Dr. Molina multiple times and outpatient x-rays were concerning for underlying infection.  An outpatient MRI was ordered that showed osteomyelitis of the proximal phalanx and admission was recommended for surgical intervention.  We discussed the procedure, including risks, and the patient wished to forego further nonoperative management in favor of surgical intervention.    DESCRIPTION OF PROCEDURE:  After obtaining written consent, the patient was transferred to the operating room and placed in the supine position on the operating room table.  IV sedation was initiated.  The foot was  anesthetized with a preoperative local.  It was then prepped and draped in normal aseptic fashion.  No tourniquet was utilized.  The patient, procedure, and site were correctly identified by OR staff.    RIGHT GREAT TOE AMPUTATION WITH FLAP CLOSURE OF WOUND:  Attention was directed to the right great toe.  An incision was drawn out to excise the plantar ulceration, to gain access to the underlying bone/first MPJ, and to create a dorsal soft tissue flap to help facilitate closure.  The incision was carried down to bone, full thickness, and the soft tissues reflected off the proximal phalanx.  The metatarsophalangeal joint was disarticulated and the toe was handed off to the back table.  Bone was obtained from the proximal phalanx underlying the ulceration for both cultures and histological evaluation.  One vessel required hand ligation, but remaining vessels were electrocauterized.  The patient was recently noted to have a DVT and has been on a heparin drip.  As such, a 1/4-inch Penrose drain was placed at the base of the wound to prevent subflap hematoma.  The base of the wound showed no signs of ascending infection and so after the wound was flushed with copious amounts of normal saline and adequate hemostasis was achieved, the dorsal flap was advanced distal and plantar and closed single layer with 3-0 nylon with minimal tension along the incision.    A dry sterile dressing was applied to the patient's right foot.  He appeared to tolerate the procedure and anesthesia well and was transferred to the PACU with vital signs stable and vascular status intact.  The patient will be readmitted to the floor for IV antibiotics and monitoring.    Wili Quiles DPM        D: 2022   T: 2022   MT: SIENA    Name:     CEDRIC LAWRiley  MRN:      [id number]        Account:        [id number]   :      1960           Procedure Date: 2022     Document: [id number]

## 2022-06-05 NOTE — PLAN OF CARE
Pt Ox4. VSS on RA. Denied pain, CP, SOB. . PIV to left intact, infusing w/ D5 1/2 NS @ 125 ml/hr. Hep paused at 0218 per orders. Up ad tasia in room, steady gait, denies dizziness. Maintains NPO since MN. Showered on eves per report. Anticipatory surgery at 0800. Will continue POC.     Goal Outcome Evaluation:    Plan of Care Reviewed With: patient     Overall Patient Progress: no change

## 2022-06-05 NOTE — ANESTHESIA PREPROCEDURE EVALUATION
Anesthesia Pre-Procedure Evaluation    Patient: Crispin Rojas   MRN: [id number] : 1960        Procedure : Procedure(s):  AMPUTATION OF RIGHT GREAT TOE          Past Medical History:   Diagnosis Date     Cerebral infarction (H)     2010     Chronic infection      Hyperlipidemia LDL goal <70      Hypertension      Numbness and tingling      Obesity      GILA (obstructive sleep apnea) 10/22/2018    CPAP intolerant     PVD (peripheral vascular disease) (H)     s/p left partial toe amputation     Renal stone      Tremor      Type 2 diabetes mellitus with diabetic polyneuropathy, with long-term current use of insulin (H)       Past Surgical History:   Procedure Laterality Date     AMPUTATE FOOT Left 2016    Procedure: AMPUTATE FOOT;  Surgeon: Jack Younger DPM;  Location: [facility] OR     AMPUTATE TOE(S) Right 2016    Procedure: AMPUTATE TOE(S);  Surgeon: Rachelle Manriquez DPM, Pod;  Location: [facility] OR     AMPUTATE TOE(S) Right 2019    Procedure: Right fourth toe partial amputation for treatment of osteomyelitis.;  Surgeon: Jack Younger DPM;  Location: [facility] OR     AMPUTATE TOE(S) Left 2019    Procedure: Left second toe amputation at the metatarsophalangeal joint.;  Surgeon: Rachelle Manriquez DPM, Podiatry/Foot and Ankle Surgery;  Location: [facility] OR     ANGIOGRAM       COLONOSCOPY       COMBINED CYSTOSCOPY, RETROGRADES, URETEROSCOPY, INSERT STENT Left 2016    Procedure: COMBINED CYSTOSCOPY, RETROGRADES, URETEROSCOPY, INSERT STENT;  Surgeon: Artemio Valenzuela MD;  Location: [facility] OR     COMBINED CYSTOSCOPY, RETROGRADES, URETEROSCOPY, LASER HOLMIUM LITHOTRIPSY URETER(S), INSERT STENT Left 10/3/2016    Procedure: COMBINED CYSTOSCOPY, RETROGRADES, URETEROSCOPY, LASER HOLMIUM LITHOTRIPSY URETER(S), INSERT STENT;  Surgeon: Artemio Valenzuela MD;  Location: [facility] OR     EXTRACORPOREAL SHOCK WAVE LITHOTRIPSY (ESWL) Left 2016    Procedure: EXTRACORPOREAL SHOCK WAVE LITHOTRIPSY (ESWL);  Surgeon:  Artemio Valenzuela MD;  Location: [facility] OR     RECESSION GASTROCNEMIUS Right 2/1/2016    Procedure: RECESSION GASTROCNEMIUS;  Surgeon: Rachelle Manriquez DPM, Pod;  Location: [facility] OR      Allergies   Allergen Reactions     Bactrim [Sulfamethoxazole W/Trimethoprim] Nausea and Vomiting     Sulfa Drugs Nausea     Niacin Swelling     SIMCOR caused edema in extremities     Simvastatin Swelling     SIMCOR caused edema in extremities      Social History     Tobacco Use     Smoking status: Never Smoker     Smokeless tobacco: Never Used   Substance Use Topics     Alcohol use: Yes     Alcohol/week: 0.0 standard drinks     Comment: rare---red wine 3x per month      Wt Readings from Last 1 Encounters:   06/05/22 111.4 kg (245 lb 9.6 oz)        Anesthesia Evaluation   Pt has had prior anesthetic. Type: MAC and General.    No history of anesthetic complications       ROS/MED HX  ENT/Pulmonary:     (+) sleep apnea, doesn't use CPAP,     Neurologic:     (+) peripheral neuropathy, - DM poly. CVA,     Cardiovascular:     (+) Dyslipidemia hypertension-Peripheral Vascular Disease-- Symptomatic. ---Previous cardiac testing   Echo: Date: 2021 Results:  Mild aortic root dilatation.(4.3)  The ascending aorta is Mildly dilated.(4.3)  There is mild concentric left ventricular hypertrophy.  The left atrium is mildly dilated.  The visual ejection fraction is 60-65%.  Left ventricular diastolic function is normal.  Compared to the prior study dated 7/23/20, there have been no changes.  Stress Test: Date: Results:    ECG Reviewed: Date: Results:    Cath: Date: Results:      METS/Exercise Tolerance:     Hematologic:     (+) anemia,     Musculoskeletal:  - neg musculoskeletal ROS     GI/Hepatic:  - neg GI/hepatic ROS     Renal/Genitourinary:     (+) renal disease, Nephrolithiasis ,     Endo:     (+) type II DM, Last HgA1c: 6.6, Using insulin, Obesity,     Psychiatric/Substance Use:  - neg psychiatric ROS     Infectious Disease: Comment: Chronic  infection, foot      Malignancy:       Other:            Physical Exam    Airway        Mallampati: II   TM distance: > 3 FB   Neck ROM: full   Mouth opening: > 3 cm    Respiratory Devices and Support         Dental  no notable dental history         Cardiovascular          Rhythm and rate: regular and normal     Pulmonary   pulmonary exam normal                OUTSIDE LABS:  CBC:   Lab Results   Component Value Date    WBC 5.3 06/04/2022    WBC 5.4 06/03/2022    HGB 11.7 (L) 06/04/2022    HGB 11.2 (L) 06/03/2022    HCT 37.3 (L) 06/04/2022    HCT 35.1 (L) 06/03/2022     (L) 06/04/2022     (L) 06/03/2022     BMP:   Lab Results   Component Value Date     06/04/2022     06/03/2022    POTASSIUM 4.0 06/04/2022    POTASSIUM 4.3 06/03/2022    CHLORIDE 105 06/04/2022    CHLORIDE 105 06/03/2022    CO2 29 06/04/2022    CO2 29 06/03/2022    BUN 20 06/04/2022    BUN 33 (H) 06/03/2022    CR 0.79 06/04/2022    CR 0.97 06/03/2022     (H) 06/05/2022     (H) 06/05/2022     COAGS:   Lab Results   Component Value Date    INR 1.08 11/07/2019     POC:   Lab Results   Component Value Date     (H) 05/10/2021     HEPATIC:   Lab Results   Component Value Date    ALBUMIN 3.6 05/10/2021    PROTTOTAL 7.4 05/10/2021    ALT 56 05/10/2021    AST 38 05/10/2021    ALKPHOS 54 05/10/2021    BILITOTAL 0.5 05/10/2021     OTHER:   Lab Results   Component Value Date    LACT 1.4 06/03/2022    A1C 6.6 (H) 06/03/2022    NARA 8.5 06/04/2022    MAG 2.1 08/02/2019    LIPASE 170 05/10/2021    TSH 1.32 05/12/2020    CRP 8.5 (H) 06/03/2022    SED 37 (H) 06/03/2022       Anesthesia Plan    ASA Status:  3   NPO Status:  NPO Appropriate    Anesthesia Type: MAC.     - Reason for MAC: chronic cardiopulmonary disease, straight local not clinically adequate   Induction: Intravenous.   Maintenance: TIVA.        Consents    Anesthesia Plan(s) and associated risks, benefits, and realistic alternatives discussed. Questions  answered and patient/representative(s) expressed understanding.    - Discussed:     - Discussed with:  Patient      - Extended Intubation/Ventilatory Support Discussed: No.      - Patient is DNR/DNI Status: No    Use of blood products discussed: No .     Postoperative Care    Pain management: IV analgesics.   PONV prophylaxis: Ondansetron (or other 5HT-3), Dexamethasone or Solumedrol     Comments:                Jesse Nogueira MD

## 2022-06-05 NOTE — ANESTHESIA POSTPROCEDURE EVALUATION
Patient: Crispin Rojas    Procedure: Procedure(s):  AMPUTATION OF RIGHT GREAT TOE       Anesthesia Type:  MAC    Note:  Disposition: Inpatient   Postop Pain Control: Uneventful            Sign Out: Well controlled pain   PONV: No   Neuro/Psych: Uneventful            Sign Out: Acceptable/Baseline neuro status   Airway/Respiratory: Uneventful            Sign Out: Acceptable/Baseline resp. status   CV/Hemodynamics: Uneventful            Sign Out: Acceptable CV status   Other NRE: NONE   DID A NON-ROUTINE EVENT OCCUR? No           Last vitals:  Vitals Value Taken Time   /64 06/05/22 0855   Temp 97.3  F (36.3  C) 06/05/22 0838   Pulse 49 06/05/22 0855   Resp 14 06/05/22 0850   SpO2 97 % 06/05/22 0858   Vitals shown include unvalidated device data.    Electronically Signed By: Jesse Nogueira MD  June 5, 2022  8:58 AM

## 2022-06-05 NOTE — PROGRESS NOTES
A&OX4. Ls  Clear. VSS  Oxygen 96% on room air. Afebrile. /57 HR 53 bpm. Up with SBA. NPO after mid night due to Right great  big toe surgical procedure tomorrow. IV vancomycin every 12 hours, IV maxipime every 8 hours. , stop heparin 5 hours before surgey, Npo after mid night,  Continue to monitor

## 2022-06-06 ENCOUNTER — APPOINTMENT (OUTPATIENT)
Dept: PHYSICAL THERAPY | Facility: CLINIC | Age: 62
End: 2022-06-06
Attending: PODIATRIST
Payer: COMMERCIAL

## 2022-06-06 LAB
BACTERIA WND CULT: ABNORMAL
ERYTHROCYTE [DISTWIDTH] IN BLOOD BY AUTOMATED COUNT: 13.3 % (ref 10–15)
GLUCOSE BLDC GLUCOMTR-MCNC: 129 MG/DL (ref 70–99)
GLUCOSE BLDC GLUCOMTR-MCNC: 134 MG/DL (ref 70–99)
GLUCOSE BLDC GLUCOMTR-MCNC: 139 MG/DL (ref 70–99)
GLUCOSE BLDC GLUCOMTR-MCNC: 157 MG/DL (ref 70–99)
GLUCOSE BLDC GLUCOMTR-MCNC: 200 MG/DL (ref 70–99)
HCT VFR BLD AUTO: 36.9 % (ref 40–53)
HGB BLD-MCNC: 11.6 G/DL (ref 13.3–17.7)
MCH RBC QN AUTO: 28.5 PG (ref 26.5–33)
MCHC RBC AUTO-ENTMCNC: 31.4 G/DL (ref 31.5–36.5)
MCV RBC AUTO: 91 FL (ref 78–100)
PLATELET # BLD AUTO: 119 10E3/UL (ref 150–450)
RBC # BLD AUTO: 4.07 10E6/UL (ref 4.4–5.9)
UFH PPP CHRO-ACNC: 0.26 IU/ML
UFH PPP CHRO-ACNC: 0.35 IU/ML
UFH PPP CHRO-ACNC: 0.55 IU/ML
WBC # BLD AUTO: 5 10E3/UL (ref 4–11)

## 2022-06-06 PROCEDURE — 85520 HEPARIN ASSAY: CPT | Performed by: HOSPITALIST

## 2022-06-06 PROCEDURE — 250N000011 HC RX IP 250 OP 636: Performed by: PODIATRIST

## 2022-06-06 PROCEDURE — 250N000011 HC RX IP 250 OP 636: Performed by: HOSPITALIST

## 2022-06-06 PROCEDURE — 250N000013 HC RX MED GY IP 250 OP 250 PS 637: Performed by: PODIATRIST

## 2022-06-06 PROCEDURE — 97116 GAIT TRAINING THERAPY: CPT | Mod: GP | Performed by: PHYSICAL THERAPIST

## 2022-06-06 PROCEDURE — 120N000001 HC R&B MED SURG/OB

## 2022-06-06 PROCEDURE — 36415 COLL VENOUS BLD VENIPUNCTURE: CPT | Performed by: PODIATRIST

## 2022-06-06 PROCEDURE — 258N000003 HC RX IP 258 OP 636: Performed by: PODIATRIST

## 2022-06-06 PROCEDURE — 99232 SBSQ HOSP IP/OBS MODERATE 35: CPT | Performed by: INTERNAL MEDICINE

## 2022-06-06 PROCEDURE — 99024 POSTOP FOLLOW-UP VISIT: CPT | Mod: GC | Performed by: PODIATRIST

## 2022-06-06 PROCEDURE — 85520 HEPARIN ASSAY: CPT | Performed by: INTERNAL MEDICINE

## 2022-06-06 PROCEDURE — 97161 PT EVAL LOW COMPLEX 20 MIN: CPT | Mod: GP | Performed by: PHYSICAL THERAPIST

## 2022-06-06 PROCEDURE — 250N000013 HC RX MED GY IP 250 OP 250 PS 637: Performed by: INTERNAL MEDICINE

## 2022-06-06 PROCEDURE — 36415 COLL VENOUS BLD VENIPUNCTURE: CPT | Performed by: INTERNAL MEDICINE

## 2022-06-06 PROCEDURE — 85027 COMPLETE CBC AUTOMATED: CPT | Performed by: PODIATRIST

## 2022-06-06 RX ORDER — PANTOPRAZOLE SODIUM 40 MG/1
40 TABLET, DELAYED RELEASE ORAL DAILY
Status: DISCONTINUED | OUTPATIENT
Start: 2022-06-06 | End: 2022-06-08 | Stop reason: HOSPADM

## 2022-06-06 RX ORDER — GABAPENTIN 300 MG/1
300 CAPSULE ORAL 3 TIMES DAILY
Status: DISCONTINUED | OUTPATIENT
Start: 2022-06-06 | End: 2022-06-08 | Stop reason: HOSPADM

## 2022-06-06 RX ORDER — METFORMIN HCL 500 MG
500 TABLET, EXTENDED RELEASE 24 HR ORAL 2 TIMES DAILY WITH MEALS
Status: DISCONTINUED | OUTPATIENT
Start: 2022-06-06 | End: 2022-06-08 | Stop reason: HOSPADM

## 2022-06-06 RX ADMIN — AMLODIPINE BESYLATE 5 MG: 5 TABLET ORAL at 08:23

## 2022-06-06 RX ADMIN — CEFEPIME HYDROCHLORIDE 2 G: 2 INJECTION, POWDER, FOR SOLUTION INTRAVENOUS at 13:51

## 2022-06-06 RX ADMIN — LISINOPRIL 40 MG: 40 TABLET ORAL at 08:23

## 2022-06-06 RX ADMIN — ATORVASTATIN CALCIUM 20 MG: 20 TABLET, FILM COATED ORAL at 21:06

## 2022-06-06 RX ADMIN — HEPARIN SODIUM AND DEXTROSE 1950 UNITS/HR: 10000; 5 INJECTION INTRAVENOUS at 09:03

## 2022-06-06 RX ADMIN — HEPARIN SODIUM AND DEXTROSE 1950 UNITS/HR: 10000; 5 INJECTION INTRAVENOUS at 12:23

## 2022-06-06 RX ADMIN — METFORMIN HYDROCHLORIDE 500 MG: 500 TABLET, EXTENDED RELEASE ORAL at 18:29

## 2022-06-06 RX ADMIN — CEFEPIME HYDROCHLORIDE 2 G: 2 INJECTION, POWDER, FOR SOLUTION INTRAVENOUS at 21:06

## 2022-06-06 RX ADMIN — FUROSEMIDE 40 MG: 40 TABLET ORAL at 08:23

## 2022-06-06 RX ADMIN — CEFEPIME HYDROCHLORIDE 2 G: 2 INJECTION, POWDER, FOR SOLUTION INTRAVENOUS at 06:07

## 2022-06-06 RX ADMIN — VANCOMYCIN HYDROCHLORIDE 1250 MG: 5 INJECTION, POWDER, LYOPHILIZED, FOR SOLUTION INTRAVENOUS at 14:33

## 2022-06-06 RX ADMIN — AMLODIPINE BESYLATE 5 MG: 5 TABLET ORAL at 21:06

## 2022-06-06 RX ADMIN — METFORMIN HYDROCHLORIDE 500 MG: 500 TABLET, EXTENDED RELEASE ORAL at 13:50

## 2022-06-06 RX ADMIN — GABAPENTIN 300 MG: 300 CAPSULE ORAL at 18:29

## 2022-06-06 RX ADMIN — GABAPENTIN 300 MG: 300 CAPSULE ORAL at 21:07

## 2022-06-06 RX ADMIN — HYDROCHLOROTHIAZIDE 12.5 MG: 12.5 CAPSULE ORAL at 08:23

## 2022-06-06 RX ADMIN — PANTOPRAZOLE SODIUM 40 MG: 40 TABLET, DELAYED RELEASE ORAL at 13:50

## 2022-06-06 RX ADMIN — VANCOMYCIN HYDROCHLORIDE 1250 MG: 5 INJECTION, POWDER, LYOPHILIZED, FOR SOLUTION INTRAVENOUS at 02:40

## 2022-06-06 ASSESSMENT — ACTIVITIES OF DAILY LIVING (ADL)
ADLS_ACUITY_SCORE: 39
ADLS_ACUITY_SCORE: 39
ADLS_ACUITY_SCORE: 40
ADLS_ACUITY_SCORE: 39
ADLS_ACUITY_SCORE: 40
ADLS_ACUITY_SCORE: 39
ADLS_ACUITY_SCORE: 40
ADLS_ACUITY_SCORE: 39
ADLS_ACUITY_SCORE: 39

## 2022-06-06 NOTE — PROGRESS NOTES
A&OX4. LS clear. VSS oxygen 95% on 2LNC. Afebrile.  Right great big toes surgical site intact with some small amount of drainage. The drainage has not saturated pass the marked lines. CMS intact.  Denied pain/ discomfort. Heparin to resume. On IV Vancomycin. Up with assist of 1 with gait belt and walker. Mod carb diet.  and 192. Continue to monitor.

## 2022-06-06 NOTE — PROGRESS NOTES
Foot & Ankle Surgery  June 6th, 2022    CC: right great toe infection    I was asked to see Crispin Rojas regarding the chief complaint by:  Dr. ANU Hood    HPI:  Pt is a 62 year old male seen in follow up for his right foot. Had a right hallux amputation yesterday. Denies significant pain to site. Does have baseline neuropathy. Has been getting around okay with air cast. No significant complaints.     ROS:   Pos for CC.  The patient denies current nausea, vomiting, chills, fevers, belly pain, calf pain, chest pain or SOB.  Complete remainder of ROS is otherwise neg.    VITALS:    Vitals:    06/05/22 2229 06/06/22 0009 06/06/22 0402 06/06/22 0808   BP: 121/43 134/65 110/45 129/68   BP Location: Right arm Left arm Left arm Left arm   Patient Position: Supine   Supine   Cuff Size:    Adult Regular   Pulse:  51 51    Resp:  22 17 18   Temp:  97.7  F (36.5  C) 97.7  F (36.5  C) 97.8  F (36.6  C)   TempSrc:  Oral Oral Oral   SpO2:  96% 99% 98%   Weight:   111.4 kg (245 lb 11.2 oz)    Height:           PMH:    Past Medical History:   Diagnosis Date     Cerebral infarction (H)     2010     Chronic infection      Hyperlipidemia LDL goal <70      Hypertension      Numbness and tingling      Obesity      GILA (obstructive sleep apnea) 10/22/2018    CPAP intolerant     PVD (peripheral vascular disease) (H)     s/p left partial toe amputation     Renal stone      Tremor      Type 2 diabetes mellitus with diabetic polyneuropathy, with long-term current use of insulin (H)        SXHX:    Past Surgical History:   Procedure Laterality Date     AMPUTATE FOOT Left 8/18/2016    Procedure: AMPUTATE FOOT;  Surgeon: Jack Younger DPM;  Location: [facility]     AMPUTATE TOE(S) Right 2/1/2016    Procedure: AMPUTATE TOE(S);  Surgeon: Rachelle Manriquez DPM, Pod;  Location: [facility]     AMPUTATE TOE(S) Right 8/2/2019    Procedure: Right fourth toe partial amputation for treatment of osteomyelitis.;  Surgeon: Jack Younger DPM;   Location: [facility]     AMPUTATE TOE(S) Left 11/8/2019    Procedure: Left second toe amputation at the metatarsophalangeal joint.;  Surgeon: Rachelle Manriquez DPM, Podiatry/Foot and Ankle Surgery;  Location: [facility]     AMPUTATE TOE(S) Right 6/5/2022    Procedure: AMPUTATION OF RIGHT GREAT TOE;  Surgeon: Wili Quiles DPM;  Location: [facility]     ANGIOGRAM       COLONOSCOPY       COMBINED CYSTOSCOPY, RETROGRADES, URETEROSCOPY, INSERT STENT Left 8/21/2016    Procedure: COMBINED CYSTOSCOPY, RETROGRADES, URETEROSCOPY, INSERT STENT;  Surgeon: Artemio Valenzuela MD;  Location: [facility]     COMBINED CYSTOSCOPY, RETROGRADES, URETEROSCOPY, LASER HOLMIUM LITHOTRIPSY URETER(S), INSERT STENT Left 10/3/2016    Procedure: COMBINED CYSTOSCOPY, RETROGRADES, URETEROSCOPY, LASER HOLMIUM LITHOTRIPSY URETER(S), INSERT STENT;  Surgeon: Artemio Valenzuela MD;  Location: [facility]     EXTRACORPOREAL SHOCK WAVE LITHOTRIPSY (ESWL) Left 9/8/2016    Procedure: EXTRACORPOREAL SHOCK WAVE LITHOTRIPSY (ESWL);  Surgeon: Artemio Valenzuela MD;  Location: [facility]     RECESSION GASTROCNEMIUS Right 2/1/2016    Procedure: RECESSION GASTROCNEMIUS;  Surgeon: Rachelle Manriquez DPM, Pod;  Location: [facility]        MEDS:    Current Facility-Administered Medications   Medication     acetaminophen (TYLENOL) tablet 325 mg     amLODIPine (NORVASC) tablet 5 mg     atorvastatin (LIPITOR) tablet 20 mg     ceFEPIme (MAXIPIME) 2 g vial to attach to  ml bag for ADULTS or 50 ml bag for PEDS     glucose gel 15-30 g    Or     dextrose 50 % injection 25-50 mL    Or     glucagon injection 1 mg     furosemide (LASIX) tablet 40 mg     gabapentin (NEURONTIN) capsule 300 mg     heparin infusion 25,000 units in D5W 250 mL ANTICOAGULANT     hydrochlorothiazide (MICROZIDE) capsule 12.5 mg     insulin aspart (NovoLOG) injection (RAPID ACTING)     insulin aspart (NovoLOG) injection (RAPID ACTING)     insulin aspart (NovoLOG) injection (RAPID ACTING)     insulin degludec  (TRESIBA) 100 UNIT/ML injection 20 Units     lidocaine (LMX4) cream     lidocaine (LMX4) cream     lidocaine 1 % 0.1-1 mL     lidocaine 1 % 0.1-1 mL     lisinopril (ZESTRIL) tablet 40 mg     melatonin tablet 1 mg     metFORMIN (GLUCOPHAGE XR) 24 hr tablet 500 mg     naloxone (NARCAN) injection 0.2 mg    Or     naloxone (NARCAN) injection 0.4 mg    Or     naloxone (NARCAN) injection 0.2 mg    Or     naloxone (NARCAN) injection 0.4 mg     ondansetron (ZOFRAN ODT) ODT tab 4 mg    Or     ondansetron (ZOFRAN) injection 4 mg     oxyCODONE (ROXICODONE) tablet 5 mg     pantoprazole (PROTONIX) EC tablet 40 mg     semaglutide (OZEMPIC) pen for injection 1 mg     sodium chloride (PF) 0.9% PF flush 3 mL     sodium chloride (PF) 0.9% PF flush 3 mL     sodium chloride (PF) 0.9% PF flush 3 mL     sodium chloride (PF) 0.9% PF flush 3 mL     vancomycin 1250 mg in 0.9% NaCl 250 mL intermittent infusion 1,250 mg       ALL:     Allergies   Allergen Reactions     Bactrim [Sulfamethoxazole W/Trimethoprim] Nausea and Vomiting     Sulfa Drugs Nausea     Niacin Swelling     SIMCOR caused edema in extremities     Simvastatin Swelling     SIMCOR caused edema in extremities       FMH:    Family History   Problem Relation Age of Onset     Arthritis Mother         SLE     Connective Tissue Disorder Mother         lupus     Diabetes Mother      Cerebrovascular Disease Mother      Cancer Mother      Diabetes Father      Diabetes Maternal Grandfather      Diabetes Sister        SocHx:    Social History     Socioeconomic History     Marital status:      Spouse name: Sydney     Number of children: 2     Years of education: Not on file     Highest education level: Master's degree (e.g., MA, MS, Arleth, MEd, MSW, ELIANA)   Occupational History     Occupation: marketing     Employer: [employer]     Comment: [email or web address]   Tobacco Use     Smoking status: Never Smoker     Smokeless tobacco: Never Used   Vaping Use     Vaping Use: Never used   Substance  and Sexual Activity     Alcohol use: Yes     Alcohol/week: 0.0 standard drinks     Comment: rare---red wine 3x per month     Drug use: No     Sexual activity: Not Currently     Partners: Female   Other Topics Concern     Parent/sibling w/ CABG, MI or angioplasty before 65F 55M? No   Social History Narrative     Not on file     Social Determinants of Health     Financial Resource Strain: Low Risk      Difficulty of Paying Living Expenses: Not very hard   Food Insecurity: No Food Insecurity     Worried About Running Out of Food in the Last Year: Never true     Ran Out of Food in the Last Year: Never true   Transportation Needs: No Transportation Needs     Lack of Transportation (Medical): No     Lack of Transportation (Non-Medical): No   Physical Activity: Sufficiently Active     Days of Exercise per Week: 4 days     Minutes of Exercise per Session: 40 min   Stress: No Stress Concern Present     Feeling of Stress : Not at all   Social Connections: Moderately Isolated     Frequency of Communication with Friends and Family: Once a week     Frequency of Social Gatherings with Friends and Family: Never     Attends Baptism Services: More than 4 times per year     Active Member of Clubs or Organizations: No     Attends Club or Organization Meetings: Not on file     Marital Status:    Intimate Partner Violence: Not on file   Housing Stability: Low Risk      Unable to Pay for Housing in the Last Year: No     Number of Places Lived in the Last Year: 2     Unstable Housing in the Last Year: No           EXAMINATION:  Gen:   No apparent distress  Neuro:   A&Ox3, no deficits  Psych:    Answering questions appropriately for age and situation with normal affect  Head:    NCAT  Eye:    Visual scanning without deficit  Ear:    Response to auditory stimuli wnl  Lung:    Non-labored breathing on RA noted  Abd:    NTND per patient report  Lymph:    Neg for pitting/non-pitting edema BLE  Vasc:    Pulses are palpable at bedside  today, CFT minimally delayed  Neuro:    Light touch sensation greatly diminished distally  Derm:    Right foot evaluated: incision site well closed, no cellulitis or devascularization to tissue. Penrose drain removed. Noted sanguinous drainage. Nontender.   MSK:   Previous partial R great toe amputation  Calf:    Right calf tenderness noted      Imaging:  MR R foot 6/2/22 - Impression:  1. Great toe, proximal phalanx osteomyelitis with signal abnormality  extending to the base.  *  Associated plantar ulcer and sinus tract at the level of proximal  phalangeal head.  *  Underlying maximal dimension 15 x 20 x 25 mm (mediolateral x  dorsoplantar x proximodistal) fluid accumulation around the proximal  phalangeal head. Although no thick rim enhancement, this may be  represent early abscess given suggestion of some wall formation.    -US duplex scan right lower extremity - IMPRESSION:  1.  Subocclusive DVT within the right posterior tibial vein. Otherwise no evidence of DVT within the right lower extremity.    Labs:    Component      Latest Ref Rng & Units 6/3/2022 6/4/2022   WBC      4.0 - 11.0 10e3/uL  5.3   RBC Count      4.40 - 5.90 10e6/uL  4.18 (L)   Hemoglobin      13.3 - 17.7 g/dL  11.7 (L)   Hematocrit      40.0 - 53.0 %  37.3 (L)   MCV      78 - 100 fL  89   MCH      26.5 - 33.0 pg  28.0   MCHC      31.5 - 36.5 g/dL  31.4 (L)   RDW      10.0 - 15.0 %  13.5   Platelet Count      150 - 450 10e3/uL  125 (L)   Lactic Acid      0.7 - 2.0 mmol/L 1.4    Hemoglobin A1C      0.0 - 5.6 % 6.6 (H)        Cultures:     2+ Pseudomonas aeruginosa Abnormal        2+ Morganella morganii Abnormal        3+ Staphylococcus aureus Abnormal        2+ Normal josé            Resulting Agency: [facility]       Susceptibility     Pseudomonas aeruginosa Morganella morganii Staphylococcus aureus     JEFFREY JEFFREY JEFFREY     Amikacin <=2.0 ug/mL Susceptible         Ampicillin   >=32.0 ug/mL Resistant 1       Ampicillin/ Sulbactam   >=32.0 ug/mL Resistant        Cefepime 8.0 ug/mL Susceptible <=1.0 ug/mL Susceptible       Ceftazidime 8.0 ug/mL Susceptible <=1.0 ug/mL Susceptible       Ceftriaxone   <=1.0 ug/mL Susceptible       Ciprofloxacin 0.5 ug/mL Susceptible <=0.25 ug/mL Susceptible       Clindamycin     <=0.25 ug/mL Susceptible 2     Erythromycin     >=8.0 ug/mL Resistant     Gentamicin <=1.0 ug/mL Susceptible <=1.0 ug/mL Susceptible <=0.5 ug/mL Susceptible     Levofloxacin 2.0 ug/mL Intermediate <=0.12 ug/mL Susceptible       Meropenem 1.0 ug/mL Susceptible <=0.25 ug/mL Susceptible       Oxacillin     0.5 ug/mL Susceptible 3     Piperacillin/Tazobactam 32.0 ug/mL Intermediate <=4.0 ug/mL Susceptible       Tetracycline     <=1.0 ug/mL Susceptible     Tobramycin <=1.0 ug/mL Susceptible <=1.0 ug/mL Susceptible       Trimethoprim/Sulfamethoxazole   <=1/19 ug/mL Susceptible <=0.5/9.5 u... Susceptible     Vancomycin     1.0 ug/mL Susceptible                   Cultures:   Bone pending    Pathology:   Pending     Assessment:  62 year old male with osteomyelitis of the R great toe, s/p amputation of hallux on 6/5/22    Plan:  Discussed all findings with patient at length. Chart reviewed   -Incision site without cellulitis. Penrose drain removed.   -Bone cultures and pathology pending from surgery. Recommend waiting for results. Likely plan for discharge with oral abx.   -Discussed post op course including need to keep foot clean and dry and to wear aircast at all times while ambulating   -Will plan to follow up with Dr. Molnia.   -No further dressing changes need until follow up.   -Cont IV abx  -Partial WB to heel of EILEEN Soto DPM   Podiatric Foot & Ankle Surgeon  [facility]  [phone]

## 2022-06-06 NOTE — PLAN OF CARE
Goal Outcome Evaluation:    Plan of Care Reviewed With: patient     Overall Patient Progress: improving    VSS. Pt on 2L O2 overnight, weaned to RA this shift. A&Ox4. Pleasant. Denies pain. LS clear. HR maritza. No CP or SOB reported. Voiding well. Had BM today. Hep gtt infusing. , 200, and 157. Good appetite. Up in chair today, moving around ind. Using boot on right foot. Dressing changed by surgery team today, drain removed. CDI. Pt showered this shift. Wrapped foot and prevented from getting wet. Bleeding noted after shower. Dressing changed. Continuing with IV abx. Possible discharge tomorrow.

## 2022-06-06 NOTE — PLAN OF CARE
Physical Therapy Discharge Summary    Reason for therapy discharge:    Discharged to home.    Progress towards therapy goal(s). See goals on Care Plan in [facility] electronic health record for goal details.  Goals met  Pt issued SEC and recommend pt rent knee scooter for use at work and amb outside of the home.    Therapy recommendation(s):    No further therapy is recommended.    Goal Outcome Evaluation:    Plan of Care Reviewed With: patient

## 2022-06-06 NOTE — PROGRESS NOTES
Hospitalist Medicine Progress Note   [facility]       Crispin Rojas is a 62 year old gentleman with T2DM, HTN, multiple history of osteomyelitis requiring toe amputation, left second toe amputation presented to the hospital 6/3/2022 with osteomyelitis of the right toe after MRI performed by podiatry showed grade 2 proximal phalanx osteomyelitis extending to the base.  On 6/5/2022 patient underwent right great toe amputation with full closure by podiatrist  Cezar PEREZ. patient was started on IV cefepime along with vancomycin in the emergency department.  He was also diagnosed with subocclusive thrombus right posterior tibial artery for which was started on Heparin infusion        Date of Admission:  6/3/2022  Assessment & Plan     Right toe osteomyelitis  S/p amputation 6/5/2022 when he underwent Right great toe amputation with disarticulation at the first metatarsophalangeal joint with A 5 x 3 cm flap closure of the wound   Will consult infectious diseases regarding duration of antibiotic therapy    Right lower extremity DVT  On treatment with heparin at this time which will be changed to Eliquis upon discharge    IDDM II: Home insulin is Tresiba 55 units daily,  with NovoLog patient unsure of dosage he says he uses a pen, Ozempic along with metformin.  - change Tresiba 15 units, to 20 units tomorrow, and adjust accordingly along with sliding scale and carb counting- patient states he does 7 units with 10 carb at home, will change to 2 units/10 in hospital.  HTN: Patient takes amlodipine and lisinopril has been resumed. Lasix 20 mg resumed as well, as noted to have BLE edema.  Neuropathy: continue gabapentin.  HLD: Continue atorvastatin  GILA  CVA          Plan:   Discharge home in a.m. 6/7/2022    Diet: Advance Diet as Tolerated: Fully Advanced to diet(s) per Provider order; Moderate Consistent Carb (60 g CHO per Meal) Diet    DVT Prophylaxis: Heparin drip  Mccarthy Catheter: Not  present  Code Status: Full Code               The patient's care was discussed with the Patient     Uli Drummond MD  Hospitalist Service  [facility]    ______________________________________________________________________    Interval History     Symptoms   Denies any significant pain in the leg or foot    Review of Systems:   No fever or chills    Data reviewed today: I reviewed all medications, new labs and imaging results over the last 24 hours.     Physical Exam   Vital Signs: Temp: 97.8  F (36.6  C) Temp src: Oral BP: 129/68 Pulse: 51   Resp: 18 SpO2: 98 % O2 Device: Nasal cannula Oxygen Delivery: 2 LPM  Weight: 245 lbs 11.2 oz      GENERAL: Patient is not  in acute distress  HEENT:  EOM+,Conjunctiva is clear    NECK:   no Jugular Venous distention  HEART: S1 S2  regular Rate and Rhythm,  there is   no murmur,   LUNGS: Respirations are   not laboured, Lungs are   clear to auscultation   without Crepitations or Wheezing   ABDOMEN: Soft  , there is no  tenderness  ,Bowel Sounds are   Positive   LOWER LIMBS: I did not open the dressing to examine the right foot   CNS:  Alert,  Oriented x 3, Moving all the Four Limbs     Data   Recent Labs   Lab 06/06/22  1144 06/06/22  0827 06/06/22  0627 06/06/22  0140 06/05/22  1417 06/05/22  1335 06/04/22  1046 06/04/22  0806 06/03/22  1537 06/03/22  1203   WBC  --   --  5.0  --   --  4.9  --  5.3   < > 5.9   HGB  --   --  11.6*  --   --  11.6*  --  11.7*   < > 11.7*   MCV  --   --  91  --   --  90  --  89   < > 90   PLT  --   --  119*  --   --  123*  --  125*   < > 143*   NA  --   --   --   --   --   --   --  138  --  140   POTASSIUM  --   --   --   --   --   --   --  4.0  --  4.3   CHLORIDE  --   --   --   --   --   --   --  105  --  105   CO2  --   --   --   --   --   --   --  29  --  29   BUN  --   --   --   --   --   --   --  20  --  33*   CR  --   --   --   --   --   --   --  0.79  --  0.97   ANIONGAP  --   --   --   --   --   --   --  4  --  6    NARA  --   --   --   --   --   --   --  8.5  --  8.9   * 139*  --  129*   < >  --    < > 137*   < > 89    < > = values in this interval not displayed.         No results found for this or any previous visit (from the past 24 hour(s)).

## 2022-06-06 NOTE — CONSULTS
[facility]  Antimicrobial Stewardship Team (AST) Note             To:  Hospitalist  Unit:  325  Patient Name: Crispin Rojas  Allergies: Sulfa drugs (nausea/vomiting)     Brief Summary:  Patient is a 62 year old male with history of type 2 diabetes myelitis, hypertension, multiple history of osteomyelitis requiring toe amputation, and left second toe amputation admitted on 6/3 for evaluation of osteomyelitis of his right toe. Patient is s/p amputation of hallux on 6/5/22.      Assessment:  Tmax = 98.3, WBC = 5.0, Aerobic bacterial culture from right toe is growing Pseudomonas aeruginosa, 2+ Morganella morganii, and 3+ Staphylococcus aureus (MSSA)    Current Antibiotic therapy: Cefepime 2 grams IV q8hrs (D4), Vancomycin 1250 mg IV q12hrs (D4)    Clinical Features/Vital Signs (VS):    Culture Results:  Date Culture Site Organism   6/5 Anaerobic Bacterial Culture - Bone Curetting from Toe, Right No anaerobic organisms isolated after 1 day   6/5 Aerobic Bacterial Culture - Bone Curetting from Toe, Right 2+ Pseudomonas aeruginosa  2+ Morganella morganii  3+ Staphylococcus aureus  2+ Normal Sharee   6/5 Blood culture x 2 No growth after 3 days   6/3 Asymptomatic Covid-19 PCR Negative     Imaging:     Recommendation/Interventions:    No organisms requiring vancomycin. Consider discontinue IV Vancomycin.     Discussed w/ ID Staff-Dr Zachary Morillo, PharmD, BCPS  Pharmacy Clinical Specialist  [phone]

## 2022-06-06 NOTE — PROGRESS NOTES
06/06/22 0700   Quick Adds   Type of Visit Initial PT Evaluation   Living Environment   People in Home spouse   Current Living Arrangements house   Home Accessibility stairs to enter home   Number of Stairs, Main Entrance 4   Stair Railings, Main Entrance railing on left side (ascending)   Transportation Anticipated car, drives self;family or friend will provide   Living Environment Comments Pt lives with wife in a 2 level home with 4 PETE with L rail. All needs met on main level.   Self-Care   Usual Activity Tolerance good   Current Activity Tolerance moderate   Equipment Currently Used at Home none   Fall history within last six months no   Activity/Exercise/Self-Care Comment Pt independent with all ADLs and IADLs.  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  Pt is on his feet all day.   General Information   Onset of Illness/Injury or Date of Surgery 06/03/22   Referring Physician Wili Quiles   Patient/Family Therapy Goals Statement (PT) pt wants to resume being active and playing golf ASAP   Pertinent History of Current Problem (include personal factors and/or comorbidities that impact the POC) Crispin Rojas is a 62 year old male with history of type 2 diabetes myelitis, hypertension, multiple history of osteomyelitis requirin  left second toe amputation who presents to the hospital for evaluation of osteomyelitis of his right toe.  Pt underwent R great toe amp on 6/5.   Existing Precautions/Restrictions weight bearing   Weight-Bearing Status - RLE partial weight-bearing (% in comments)  (R heel partial WB in surgical shoe)   General Observations Pt sitting up in recliner upon arrival   Cognition   Affect/Mental Status (Cognition) WNL   Orientation Status (Cognition) oriented x 4   Pain Assessment   Patient Currently in Pain No   Integumentary/Edema   Integumentary/Edema Comments R foot incision covered with bandages in ortho shoe   Posture     Posture Forward head position   Range of Motion (ROM)   ROM Comment No deficits identified   Strength (Manual Muscle Testing)   Strength Comments B UEs and LEs appear WFL as observed with mobility   Bed Mobility   Bed Mobility no deficits identified   Transfers   Transfers sit-stand transfer   Sit-Stand Transfer   Sit-Stand [place] (Transfers) independent   Comment, (Sit-Stand Transfer) sit <> stand from chair and toilet Ind   Gait/Stairs (Locomotion)   [place] Level (Gait) supervision   Distance in Feet (Required for LE Total Joints) 100'   Comment, (Gait/Stairs) Pt amb 100' without AD with R ortho boot and R heel wt bear.  Pt tends to place increased weight on forefoot with longer distances.   Balance   Balance Comments Pt steady with gait with and without AD   Sensory Examination   Sensory Perception Comments B feet with peripheral neuropathy   Coordination   Coordination no deficits were identified   Muscle Tone   Muscle Tone no deficits were identified   Clinical Impression   Criteria for Skilled Therapeutic Intervention Yes, treatment indicated   PT Diagnosis (PT) Impaired gait   Influenced by the following impairments R great toe amputation and WB precautions   Functional limitations due to impairments Pt unable to amb longer distances and maintain R heel weight bearing, needed to perform job   Clinical Presentation (PT Evaluation Complexity) Stable/Uncomplicated   Clinical Presentation Rationale Pt medically stable and otherwise healthy individual   Clinical Decision Making (Complexity) low complexity   Planned Therapy Interventions (PT) gait training   Anticipated Equipment Needs at Discharge (PT) cane, straight;other (see comments)  (knee scooter)   Risk & Benefits of therapy have been explained evaluation/treatment results reviewed;care plan/treatment goals reviewed;risks/benefits reviewed;current/potential barriers reviewed;participants voiced agreement with care plan;participants  included;patient   PT Discharge Planning   PT Discharge Recommendation (DC Rec) home   PT Rationale for DC Rec Pt ambulating independently in room with R ortho boot.  Pt issued SEC for short dfstance ambulation and recommendation for knee scooter for use at work as pt on his feet all day and has difficulty maintaining R h eel weight bear with ambulation for longer distances..  Recommend pt return home with SEC and use of knee scooter for all ambulation   PT Brief overview of current status Pt independent in room with SEC   Plan of Care Review   Plan of Care Reviewed With patient   Total Evaluation Time   Total Evaluation Time (Minutes) 10   Physical Therapy Goals   PT Frequency One time eval and treatment only   PT Predicted Duration/Target Date for Goal Attainment 06/06/22   PT Goals Gait;Stairs   PT: Gait Modified independent;Straight cane;100 feet;Greater than 200 feet;Goal Met;Assistive device  (100' with L SEC and 250' with knee scooter, R heel wt bear)   PT: Stairs Modified independent;4 stairs;Rail on left;Goal Met

## 2022-06-06 NOTE — PROVIDER NOTIFICATION
Provider Notified : Patient has IVF D5% 0.45% NaCl infusing at 125 ml/hr.  patient is wandering if the fluid should be discontinued, since he has completed surgery today and is eating. Advice?

## 2022-06-07 LAB
CREAT SERPL-MCNC: 1.03 MG/DL (ref 0.66–1.25)
ERYTHROCYTE [DISTWIDTH] IN BLOOD BY AUTOMATED COUNT: 13.3 % (ref 10–15)
GFR SERPL CREATININE-BSD FRML MDRD: 83 ML/MIN/1.73M2
GLUCOSE BLDC GLUCOMTR-MCNC: 146 MG/DL (ref 70–99)
GLUCOSE BLDC GLUCOMTR-MCNC: 150 MG/DL (ref 70–99)
GLUCOSE BLDC GLUCOMTR-MCNC: 156 MG/DL (ref 70–99)
GLUCOSE BLDC GLUCOMTR-MCNC: 165 MG/DL (ref 70–99)
GLUCOSE BLDC GLUCOMTR-MCNC: 214 MG/DL (ref 70–99)
HCT VFR BLD AUTO: 35.9 % (ref 40–53)
HGB BLD-MCNC: 11.3 G/DL (ref 13.3–17.7)
MCH RBC QN AUTO: 28.3 PG (ref 26.5–33)
MCHC RBC AUTO-ENTMCNC: 31.5 G/DL (ref 31.5–36.5)
MCV RBC AUTO: 90 FL (ref 78–100)
PLATELET # BLD AUTO: 115 10E3/UL (ref 150–450)
RBC # BLD AUTO: 4 10E6/UL (ref 4.4–5.9)
UFH PPP CHRO-ACNC: 0.5 IU/ML
VANCOMYCIN SERPL-MCNC: 22.9 MG/L
WBC # BLD AUTO: 5.3 10E3/UL (ref 4–11)

## 2022-06-07 PROCEDURE — 250N000011 HC RX IP 250 OP 636: Performed by: HOSPITALIST

## 2022-06-07 PROCEDURE — 120N000001 HC R&B MED SURG/OB

## 2022-06-07 PROCEDURE — 258N000003 HC RX IP 258 OP 636: Performed by: PODIATRIST

## 2022-06-07 PROCEDURE — 250N000011 HC RX IP 250 OP 636

## 2022-06-07 PROCEDURE — 85520 HEPARIN ASSAY: CPT | Performed by: INTERNAL MEDICINE

## 2022-06-07 PROCEDURE — 36415 COLL VENOUS BLD VENIPUNCTURE: CPT | Performed by: INTERNAL MEDICINE

## 2022-06-07 PROCEDURE — 250N000011 HC RX IP 250 OP 636: Performed by: PODIATRIST

## 2022-06-07 PROCEDURE — 82565 ASSAY OF CREATININE: CPT | Performed by: HOSPITALIST

## 2022-06-07 PROCEDURE — 99232 SBSQ HOSP IP/OBS MODERATE 35: CPT | Mod: GC | Performed by: PODIATRIST

## 2022-06-07 PROCEDURE — 250N000013 HC RX MED GY IP 250 OP 250 PS 637: Performed by: PODIATRIST

## 2022-06-07 PROCEDURE — 80202 ASSAY OF VANCOMYCIN: CPT | Performed by: HOSPITALIST

## 2022-06-07 PROCEDURE — 99232 SBSQ HOSP IP/OBS MODERATE 35: CPT | Performed by: INTERNAL MEDICINE

## 2022-06-07 PROCEDURE — 250N000013 HC RX MED GY IP 250 OP 250 PS 637: Performed by: INTERNAL MEDICINE

## 2022-06-07 PROCEDURE — 85027 COMPLETE CBC AUTOMATED: CPT | Performed by: INTERNAL MEDICINE

## 2022-06-07 RX ADMIN — CEFEPIME HYDROCHLORIDE 2 G: 2 INJECTION, POWDER, FOR SOLUTION INTRAVENOUS at 04:48

## 2022-06-07 RX ADMIN — GABAPENTIN 300 MG: 300 CAPSULE ORAL at 16:52

## 2022-06-07 RX ADMIN — CEFEPIME HYDROCHLORIDE 2 G: 2 INJECTION, POWDER, FOR SOLUTION INTRAVENOUS at 14:01

## 2022-06-07 RX ADMIN — HEPARIN SODIUM AND DEXTROSE 1950 UNITS/HR: 10000; 5 INJECTION INTRAVENOUS at 13:11

## 2022-06-07 RX ADMIN — METFORMIN HYDROCHLORIDE 500 MG: 500 TABLET, EXTENDED RELEASE ORAL at 18:33

## 2022-06-07 RX ADMIN — VANCOMYCIN HYDROCHLORIDE 1250 MG: 5 INJECTION, POWDER, LYOPHILIZED, FOR SOLUTION INTRAVENOUS at 02:15

## 2022-06-07 RX ADMIN — GABAPENTIN 300 MG: 300 CAPSULE ORAL at 08:42

## 2022-06-07 RX ADMIN — AMLODIPINE BESYLATE 5 MG: 5 TABLET ORAL at 08:42

## 2022-06-07 RX ADMIN — APIXABAN 10 MG: 5 TABLET, FILM COATED ORAL at 20:42

## 2022-06-07 RX ADMIN — FUROSEMIDE 40 MG: 40 TABLET ORAL at 08:42

## 2022-06-07 RX ADMIN — ATORVASTATIN CALCIUM 20 MG: 20 TABLET, FILM COATED ORAL at 22:37

## 2022-06-07 RX ADMIN — VANCOMYCIN HYDROCHLORIDE 1250 MG: 5 INJECTION, POWDER, LYOPHILIZED, FOR SOLUTION INTRAVENOUS at 14:45

## 2022-06-07 RX ADMIN — HEPARIN SODIUM AND DEXTROSE 1950 UNITS/HR: 10000; 5 INJECTION INTRAVENOUS at 00:00

## 2022-06-07 RX ADMIN — AMLODIPINE BESYLATE 5 MG: 5 TABLET ORAL at 20:49

## 2022-06-07 RX ADMIN — METFORMIN HYDROCHLORIDE 500 MG: 500 TABLET, EXTENDED RELEASE ORAL at 08:42

## 2022-06-07 RX ADMIN — GABAPENTIN 300 MG: 300 CAPSULE ORAL at 22:37

## 2022-06-07 RX ADMIN — HYDROCHLOROTHIAZIDE 12.5 MG: 12.5 CAPSULE ORAL at 08:42

## 2022-06-07 RX ADMIN — CEFEPIME HYDROCHLORIDE 2 G: 2 INJECTION, POWDER, FOR SOLUTION INTRAVENOUS at 21:03

## 2022-06-07 RX ADMIN — LISINOPRIL 40 MG: 40 TABLET ORAL at 08:42

## 2022-06-07 RX ADMIN — PANTOPRAZOLE SODIUM 40 MG: 40 TABLET, DELAYED RELEASE ORAL at 08:42

## 2022-06-07 ASSESSMENT — ACTIVITIES OF DAILY LIVING (ADL)
ADLS_ACUITY_SCORE: 37

## 2022-06-07 NOTE — PROGRESS NOTES
Foot & Ankle Surgery  June 7th, 2022    CC: right great toe infection    I was asked to see Crispin Rojas regarding the chief complaint by:  Dr. ANU Hood    HPI:  Pt is a 62 year old male seen in follow up for his right foot. Had a right hallux amputation on 6/5/22.. Denies significant pain to site. States had some bleeding yesterday following showering. Is being treated for a DVT with heparin. Plan for eliquis for discharge.      ROS:   Pos for CC.  The patient denies current nausea, vomiting, chills, fevers, belly pain, calf pain, chest pain or SOB.  Complete remainder of ROS is otherwise neg.    VITALS:    Vitals:    06/06/22 2104 06/06/22 2247 06/07/22 0507 06/07/22 0821   BP: 132/61 123/63 102/45    BP Location: Left arm Left arm Left arm    Patient Position:   Right side    Cuff Size:       Pulse: 54 55 53 50   Resp: 18 18 18 18   Temp:  98  F (36.7  C) 98  F (36.7  C) 98  F (36.7  C)   TempSrc:  Oral Oral Oral   SpO2: 94% 94% 94% 93%   Weight:   111.8 kg (246 lb 6.4 oz)    Height:           PMH:    Past Medical History:   Diagnosis Date     Cerebral infarction (H)     2010     Chronic infection      Hyperlipidemia LDL goal <70      Hypertension      Numbness and tingling      Obesity      GILA (obstructive sleep apnea) 10/22/2018    CPAP intolerant     PVD (peripheral vascular disease) (H)     s/p left partial toe amputation     Renal stone      Tremor      Type 2 diabetes mellitus with diabetic polyneuropathy, with long-term current use of insulin (H)        SXHX:    Past Surgical History:   Procedure Laterality Date     AMPUTATE FOOT Left 8/18/2016    Procedure: AMPUTATE FOOT;  Surgeon: Jack Younger DPM;  Location: [facility]     AMPUTATE TOE(S) Right 2/1/2016    Procedure: AMPUTATE TOE(S);  Surgeon: Rachelle Manriquez DPM, Pod;  Location: [facility]     AMPUTATE TOE(S) Right 8/2/2019    Procedure: Right fourth toe partial amputation for treatment of osteomyelitis.;  Surgeon: Jack Younger DPM;   Location: [facility]     AMPUTATE TOE(S) Left 11/8/2019    Procedure: Left second toe amputation at the metatarsophalangeal joint.;  Surgeon: Rachelle Manriquez DPM, Podiatry/Foot and Ankle Surgery;  Location: [facility]     AMPUTATE TOE(S) Right 6/5/2022    Procedure: AMPUTATION OF RIGHT GREAT TOE;  Surgeon: Wili Quiles DPM;  Location: [facility]     ANGIOGRAM       COLONOSCOPY       COMBINED CYSTOSCOPY, RETROGRADES, URETEROSCOPY, INSERT STENT Left 8/21/2016    Procedure: COMBINED CYSTOSCOPY, RETROGRADES, URETEROSCOPY, INSERT STENT;  Surgeon: Artemio Valenzuela MD;  Location: [facility]     COMBINED CYSTOSCOPY, RETROGRADES, URETEROSCOPY, LASER HOLMIUM LITHOTRIPSY URETER(S), INSERT STENT Left 10/3/2016    Procedure: COMBINED CYSTOSCOPY, RETROGRADES, URETEROSCOPY, LASER HOLMIUM LITHOTRIPSY URETER(S), INSERT STENT;  Surgeon: Artemio Valenzuela MD;  Location: [facility]     EXTRACORPOREAL SHOCK WAVE LITHOTRIPSY (ESWL) Left 9/8/2016    Procedure: EXTRACORPOREAL SHOCK WAVE LITHOTRIPSY (ESWL);  Surgeon: Artemio Valenzuela MD;  Location: [facility]     RECESSION GASTROCNEMIUS Right 2/1/2016    Procedure: RECESSION GASTROCNEMIUS;  Surgeon: Rachelle Manriquez DPM, Pod;  Location: [facility]        MEDS:    Current Facility-Administered Medications   Medication     acetaminophen (TYLENOL) tablet 325 mg     amLODIPine (NORVASC) tablet 5 mg     atorvastatin (LIPITOR) tablet 20 mg     ceFEPIme (MAXIPIME) 2 g vial to attach to  ml bag for ADULTS or 50 ml bag for PEDS     glucose gel 15-30 g    Or     dextrose 50 % injection 25-50 mL    Or     glucagon injection 1 mg     furosemide (LASIX) tablet 40 mg     gabapentin (NEURONTIN) capsule 300 mg     heparin infusion 25,000 units in D5W 250 mL ANTICOAGULANT     hydrochlorothiazide (MICROZIDE) capsule 12.5 mg     insulin aspart (NovoLOG) injection (RAPID ACTING)     insulin aspart (NovoLOG) injection (RAPID ACTING)     insulin aspart (NovoLOG) injection (RAPID ACTING)     insulin degludec  (TRESIBA) 100 UNIT/ML injection 20 Units     lidocaine (LMX4) cream     lidocaine (LMX4) cream     lidocaine 1 % 0.1-1 mL     lidocaine 1 % 0.1-1 mL     lisinopril (ZESTRIL) tablet 40 mg     melatonin tablet 1 mg     metFORMIN (GLUCOPHAGE XR) 24 hr tablet 500 mg     naloxone (NARCAN) injection 0.2 mg    Or     naloxone (NARCAN) injection 0.4 mg    Or     naloxone (NARCAN) injection 0.2 mg    Or     naloxone (NARCAN) injection 0.4 mg     ondansetron (ZOFRAN ODT) ODT tab 4 mg    Or     ondansetron (ZOFRAN) injection 4 mg     oxyCODONE (ROXICODONE) tablet 5 mg     pantoprazole (PROTONIX) EC tablet 40 mg     sodium chloride (PF) 0.9% PF flush 3 mL     sodium chloride (PF) 0.9% PF flush 3 mL     sodium chloride (PF) 0.9% PF flush 3 mL     sodium chloride (PF) 0.9% PF flush 3 mL     vancomycin 1250 mg in 0.9% NaCl 250 mL intermittent infusion 1,250 mg       ALL:     Allergies   Allergen Reactions     Bactrim [Sulfamethoxazole W/Trimethoprim] Nausea and Vomiting     Sulfa Drugs Nausea     Niacin Swelling     SIMCOR caused edema in extremities     Simvastatin Swelling     SIMCOR caused edema in extremities       FMH:    Family History   Problem Relation Age of Onset     Arthritis Mother         SLE     Connective Tissue Disorder Mother         lupus     Diabetes Mother      Cerebrovascular Disease Mother      Cancer Mother      Diabetes Father      Diabetes Maternal Grandfather      Diabetes Sister        SocHx:    Social History     Socioeconomic History     Marital status:      Spouse name: Sydney     Number of children: 2     Years of education: Not on file     Highest education level: Master's degree (e.g., MA, MS, Arleth, MEd, MSW, ELIANA)   Occupational History     Occupation: marketing     Employer: [employer]     Comment: [employer]   Tobacco Use     Smoking status: Never Smoker     Smokeless tobacco: Never Used   Vaping Use     Vaping Use: Never used   Substance and Sexual Activity     Alcohol use: Yes      Alcohol/week: 0.0 standard drinks     Comment: rare---red wine 3x per month     Drug use: No     Sexual activity: Not Currently     Partners: Female   Other Topics Concern     Parent/sibling w/ CABG, MI or angioplasty before 65F 55M? No   Social History Narrative     Not on file     Social Determinants of Health     Financial Resource Strain: Low Risk      Difficulty of Paying Living Expenses: Not very hard   Food Insecurity: No Food Insecurity     Worried About Running Out of Food in the Last Year: Never true     Ran Out of Food in the Last Year: Never true   Transportation Needs: No Transportation Needs     Lack of Transportation (Medical): No     Lack of Transportation (Non-Medical): No   Physical Activity: Sufficiently Active     Days of Exercise per Week: 4 days     Minutes of Exercise per Session: 40 min   Stress: No Stress Concern Present     Feeling of Stress : Not at all   Social Connections: Moderately Isolated     Frequency of Communication with Friends and Family: Once a week     Frequency of Social Gatherings with Friends and Family: Never     Attends Protestant Services: More than 4 times per year     Active Member of Clubs or Organizations: No     Attends Club or Organization Meetings: Not on file     Marital Status:    Intimate Partner Violence: Not on file   Housing Stability: Low Risk      Unable to Pay for Housing in the Last Year: No     Number of Places Lived in the Last Year: 2     Unstable Housing in the Last Year: No           EXAMINATION:  Gen:   No apparent distress  Neuro:   A&Ox3, no deficits  Psych:    Answering questions appropriately for age and situation with normal affect  Head:    NCAT  Eye:    Visual scanning without deficit  Ear:    Response to auditory stimuli wnl  Lung:    Non-labored breathing on RA noted  Abd:    NTND per patient report  Lymph:    Neg for pitting/non-pitting edema BLE  Vasc:    Pulses are palpable at bedside today, CFT minimally delayed  Neuro:    Light  touch sensation greatly diminished distally  Derm:    Dressing changed to right foot: some noted sanguinous drainage. Incision site well closed, no cellulitis or devascularization to tissue. Medial site with some maceration. No hematoma formation.   MSK:   Previous partial R great toe amputation  Calf:    Right calf tenderness noted      Imaging:  MR R foot 6/2/22 - Impression:  1. Great toe, proximal phalanx osteomyelitis with signal abnormality  extending to the base.  *  Associated plantar ulcer and sinus tract at the level of proximal  phalangeal head.  *  Underlying maximal dimension 15 x 20 x 25 mm (mediolateral x  dorsoplantar x proximodistal) fluid accumulation around the proximal  phalangeal head. Although no thick rim enhancement, this may be  represent early abscess given suggestion of some wall formation.    -US duplex scan right lower extremity - IMPRESSION:  1.  Subocclusive DVT within the right posterior tibial vein. Otherwise no evidence of DVT within the right lower extremity.    Labs:    Component      Latest Ref Rng & Units 6/3/2022 6/4/2022   WBC      4.0 - 11.0 10e3/uL  5.3   RBC Count      4.40 - 5.90 10e6/uL  4.18 (L)   Hemoglobin      13.3 - 17.7 g/dL  11.7 (L)   Hematocrit      40.0 - 53.0 %  37.3 (L)   MCV      78 - 100 fL  89   MCH      26.5 - 33.0 pg  28.0   MCHC      31.5 - 36.5 g/dL  31.4 (L)   RDW      10.0 - 15.0 %  13.5   Platelet Count      150 - 450 10e3/uL  125 (L)   Lactic Acid      0.7 - 2.0 mmol/L 1.4    Hemoglobin A1C      0.0 - 5.6 % 6.6 (H)        Cultures:     2+ Pseudomonas aeruginosa Abnormal        2+ Morganella morganii Abnormal        3+ Staphylococcus aureus Abnormal        2+ Normal josé            Resulting Agency: [facility]       Susceptibility     Pseudomonas aeruginosa Morganella morganii Staphylococcus aureus     JEFFREY JEFFREY JEFFREY     Amikacin <=2.0 ug/mL Susceptible         Ampicillin   >=32.0 ug/mL Resistant 1       Ampicillin/ Sulbactam   >=32.0 ug/mL Resistant        Cefepime 8.0 ug/mL Susceptible <=1.0 ug/mL Susceptible       Ceftazidime 8.0 ug/mL Susceptible <=1.0 ug/mL Susceptible       Ceftriaxone   <=1.0 ug/mL Susceptible       Ciprofloxacin 0.5 ug/mL Susceptible <=0.25 ug/mL Susceptible       Clindamycin     <=0.25 ug/mL Susceptible 2     Erythromycin     >=8.0 ug/mL Resistant     Gentamicin <=1.0 ug/mL Susceptible <=1.0 ug/mL Susceptible <=0.5 ug/mL Susceptible     Levofloxacin 2.0 ug/mL Intermediate <=0.12 ug/mL Susceptible       Meropenem 1.0 ug/mL Susceptible <=0.25 ug/mL Susceptible       Oxacillin     0.5 ug/mL Susceptible 3     Piperacillin/Tazobactam 32.0 ug/mL Intermediate <=4.0 ug/mL Susceptible       Tetracycline     <=1.0 ug/mL Susceptible     Tobramycin <=1.0 ug/mL Susceptible <=1.0 ug/mL Susceptible       Trimethoprim/Sulfamethoxazole   <=1/19 ug/mL Susceptible <=0.5/9.5 u... Susceptible     Vancomycin     1.0 ug/mL Susceptible                   Cultures:   Bone Curetting          0 Result Notes    Culture Culture in progress       1+ Pseudomonas aeruginosa Abnormal     Susceptibilities done on previous cultures   1+ Staphylococcus aureus Abnormal     Susceptibilities done on previous cultures   1+ Enterococcus faecalis Abnormal                Pathology:   Pending     Assessment:  62 year old male with osteomyelitis of the R great toe, s/p amputation of hallux on 6/5/22    Plan:  Discussed all findings with patient at length. Chart reviewed   -Discussed bone culture results with Dr. Quiles who confirms they were taken from proximal phalanx which was resected. A surgical removal of all infection is anticipated.   -Recommend oral abx per culture sensitivities.  -Patient with some concerns regarding bleeding from site. Site evaluated and is an appropriate amount of bleeding given known anticoagulation. Hb stable in the 11 range.   -Has planned follow up with Dr. Molina for 6/9/22.  -Okay to discharge. Will sign off. Please call with any questions.        Tiny Soto DPM   Podiatric Foot & Ankle Surgeon  [facility]  [phone]

## 2022-06-07 NOTE — PHARMACY-VANCOMYCIN DOSING SERVICE
Pharmacy Vancomycin Note  Date of Service 2022  Patient's  1960   62 year old, male    Indication: Bone and Joint Infection and Skin and Soft Tissue Infection  Day of Therapy: 5  Current vancomycin regimen:  1250 mg IV q12h  Current vancomycin monitoring method: AUC  Current vancomycin therapeutic monitoring goal: 400-600 mg*h/L    Insight[employer] Prediction of Current Vancomycin Regimen  Regimen: 1250 mg IV every 12 hours.  Start time: 14:15 on 2022  Exposure target: AUC24 (range)400-600 mg/L.hr   AUC24,ss: 547 mg/L.hr  Probability of AUC24 > 400: 98 %  Ctrough,ss: 18.1 mg/L  Probability of Ctrough,ss > 20: 35 %  Probability of nephrotoxicity (Lodise LYNN ): 14 %      Current estimated CrCl = Estimated Creatinine Clearance: 97 mL/min (based on SCr of 1.03 mg/dL).    Creatinine for last 3 days  2022:  6:46 AM Creatinine 1.03 mg/dL    Recent Vancomycin Levels (past 3 days)  2022:  9:10 AM Vancomycin 15.7 mg/L  2022:  6:46 AM Vancomycin 22.9 mg/L    Vancomycin IV Administrations (past 72 hours)                   vancomycin 1250 mg in 0.9% NaCl 250 mL intermittent infusion 1,250 mg (mg) 1,250 mg New Bag 22 0215     1,250 mg New Bag 22 1433     1,250 mg New Bag  0240     1,250 mg New Bag 22 1505     1,250 mg New Bag  0212     1,250 mg New Bag 22 1407                Nephrotoxins and other renal medications (From now, onward)    Start     Dose/Rate Route Frequency Ordered Stop    22 0900  furosemide (LASIX) tablet 40 mg         40 mg Oral DAILY 22 0900  lisinopril (ZESTRIL) tablet 40 mg         40 mg Oral DAILY 22 0200  vancomycin 1250 mg in 0.9% NaCl 250 mL intermittent infusion 1,250 mg         1,250 mg  over 90 Minutes Intravenous EVERY 12 HOURS 22               Contrast Orders - past 72 hours (72h ago, onward)    None          Interpretation of levels and current regimen:  Vancomycin level is  reflective of -600    Has serum creatinine changed greater than 50% in last 72 hours: No    Urine output:  unable to determine    Renal Function: Stable  But creat up slightly from previous level  I      Plan:  1. Continue Current Dose  2. Vancomycin monitoring method: AUC  3. Vancomycin therapeutic monitoring goal: 400-600 mg*h/L  4. Pharmacy will check vancomycin levels as appropriate in 1-3 Days.  5. Serum creatinine levels will be ordered daily for the first week of therapy and at least twice weekly for subsequent weeks.    Susana Ty, [facility]

## 2022-06-07 NOTE — PROGRESS NOTES
Hospitalist Medicine Progress Note   [facility]       Crispin Rojas is a 62 year old gentleman with T2DM, HTN, multiple history of osteomyelitis requiring toe amputation, left second toe amputation presented to the hospital 6/3/2022 with osteomyelitis of the right toe after MRI performed by podiatry showed grade 2 proximal phalanx osteomyelitis extending to the base.  On 6/5/2022 patient underwent right great toe amputation with full closure by podiatrist  Cezar PEREZ. patient was started on IV cefepime along with vancomycin in the emergency department.  He was also diagnosed with subocclusive thrombus right posterior tibial artery for which was started on Heparin infusion        Date of Admission:  6/3/2022  Assessment & Plan     Right toe osteomyelitis  S/p amputation 6/5/2022 when he underwent Right great toe amputation with disarticulation at the first metatarsophalangeal joint with A 5 x 3 cm flap closure of the wound   Consult infectious diseases regarding duration of antibiotic therapy - he recommended that the patient should be on ciprofloxacin 500 mg twice daily and Augmentin 850 mg twice daily for 10 days and follow-up on final 6/5 cx and adjust if needed    Right lower extremity DVT  On treatment with heparin at this time which will be changed to Eliquis upon discharge  Patient wants to know if he is not going to bleed more with Eliquis orally and discharge tomorrow 6/8/2022    IDDM II: Home insulin is Tresiba 55 units daily,  with NovoLog patient unsure of dosage he says he uses a pen, Ozempic along with metformin.  - change Tresiba 15 units, to 20 units tomorrow, and adjust accordingly along with sliding scale and carb counting- patient states he does 7 units with 10 carb at home, will change to 2 units/10 in hospital.    HTN: Patient takes amlodipine and lisinopril has been resumed. Lasix 20 mg resumed as well, as noted to have BLE edema.  Neuropathy: continue  gabapentin.  HLD: Continue atorvastatin  GILA  CVA          Plan:   Discharge home in a.m. 6/8/2022  Discontinue heparin and start Eliquis 10 mg twice daily    Diet: Advance Diet as Tolerated: Fully Advanced to diet(s) per Provider order; Moderate Consistent Carb (60 g CHO per Meal) Diet    DVT Prophylaxis: Heparin drip  Mccarthy Catheter: Not present  Code Status: Full Code               The patient's care was discussed with the Patient     Uli Drummond MD  Hospitalist [facility]    ______________________________________________________________________    Interval History     Symptoms   There is no pain in the right leg or foot    Review of Systems:   No fever or chills    Data reviewed today: I reviewed all medications, new labs and imaging results over the last 24 hours.     Physical Exam   Vital Signs: Temp: 98  F (36.7  C) Temp src: Oral BP: 102/45 Pulse: 50   Resp: 18 SpO2: 93 % O2 Device: None (Room air)    Weight: 246 lbs 6.4 oz      GENERAL: Patient is not  in acute distress  HEENT:  EOM+,Conjunctiva is clear    NECK:   no Jugular Venous distention  HEART: S1 S2  regular Rate and Rhythm,  there is   no murmur,   LUNGS: Respirations are   not laboured, Lungs are   clear to auscultation   without Crepitations or Wheezing   ABDOMEN: Soft  , there is no  tenderness  ,Bowel Sounds are   Positive   LOWER LIMBS: I did not open the dressing to examine the right foot   CNS:  Alert,  Oriented x 3, Moving all the Four Limbs     Data   Recent Labs   Lab 06/07/22  1150 06/07/22  0819 06/07/22  0646 06/07/22  0208 06/06/22  0827 06/06/22  0627 06/05/22  1417 06/05/22  1335 06/04/22  1046 06/04/22  0806 06/03/22  1537 06/03/22  1203   WBC  --   --  5.3  --   --  5.0  --  4.9  --  5.3   < > 5.9   HGB  --   --  11.3*  --   --  11.6*  --  11.6*  --  11.7*   < > 11.7*   MCV  --   --  90  --   --  91  --  90  --  89   < > 90   PLT  --   --  115*  --   --  119*  --  123*  --  125*   < > 143*   NA   --   --   --   --   --   --   --   --   --  138  --  140   POTASSIUM  --   --   --   --   --   --   --   --   --  4.0  --  4.3   CHLORIDE  --   --   --   --   --   --   --   --   --  105  --  105   CO2  --   --   --   --   --   --   --   --   --  29  --  29   BUN  --   --   --   --   --   --   --   --   --  20  --  33*   CR  --   --  1.03  --   --   --   --   --   --  0.79  --  0.97   ANIONGAP  --   --   --   --   --   --   --   --   --  4  --  6   NARA  --   --   --   --   --   --   --   --   --  8.5  --  8.9   * 146*  --  150*   < >  --    < >  --    < > 137*   < > 89    < > = values in this interval not displayed.         No results found for this or any previous visit (from the past 24 hour(s)).

## 2022-06-07 NOTE — PHARMACY - DISCHARGE MEDICATION RECONCILIATION AND EDUCATION
Discharge Medication Education    New medication: apixaban  Patient was educated via face-to-face on the following:   - Pathophysiology of disease requiring treatment  - Correct dose and duration of treatment  - Compliance  - The importance of follow-up monitoring  - The potential for drug and/or diet interactions  - Adverse Effects  - Storage  - Women of childbearing Age (if applicable)    Left written materials and instructions (Clinical notes from [facility]) for new medications: Yes    OUTCOMES: Patient verbalized understanding    Time spent: 15 mins

## 2022-06-07 NOTE — CONSULTS
ID consult dictated IMP 1 63 yo male Dm , DFI/osteo R great toe, amp, cxs straph, enterococcus, pseudomonas    REC OK disposition cipro 500 bid and augmentin 875 bid 10 days I will Follow-up on final 6/5 cx and adjust if needed

## 2022-06-07 NOTE — PROGRESS NOTES
Discharge Note        Crispin failed to follow up and current status is unknown.  Please see information below for last relevant information on current status.  Patient seen for 9 visits.    SUBJECTIVE  Subjective changes noted by patient:  Has been having more left interscap pain again.  Doing retr with OP and retr/ext with end range rotation.  Doing retr/ext with rot provokes the interscap pain.  .  Current pain level is  .     Previous pain level was  4/10.   Changes in function:  Yes (See Goal flowsheet attached for changes in current functional level)  Adverse reaction to treatment or activity: None    OBJECTIVE  Changes noted in objective findings: Advised pt to not do the end range rot with retr and retr/ext.  Pain abolishes during retr with OP and is better.  Pain reduces with ret/ext in sitting without rot.     ASSESSMENT/PLAN  Diagnosis: cervical/thoracic pain   Updated problem list and treatment plan:     STG/LTGs have been met or progress has been made towards goals:  Yes, please see goal flowsheet for most current information  Assessment of Progress: current status is unknown.    Last current status:     Self Management Plans:  HEP  I have re-evaluated this patient and find that the nature, scope, duration and intensity of the therapy is appropriate for the medical condition of the patient.  Crispin continues to require the following intervention to meet STG and LTG's:  HEP.    Recommendations:  Discharge with current home program.  Patient to follow up with MD as needed.    Please refer to the daily flowsheet for treatment today, total treatment time and time spent performing 1:1 timed codes.

## 2022-06-07 NOTE — PLAN OF CARE
Goal Outcome Evaluation:    VSS on RA. A/O x 4. Up independent in room. , 150. Denies pain. RPIV, heparin gtt infusing @ 1950 units/hr, Xa recheck AM draw. IV vancomycin, IV cefepime. Rt big toe amputation site, dressing changed x1 for bleeding. Sutures intact, wound cleanser applied. Plans to discharge home today. Podiatry following. Will continue with plan of care.

## 2022-06-08 VITALS
TEMPERATURE: 97.4 F | BODY MASS INDEX: 33.81 KG/M2 | OXYGEN SATURATION: 95 % | HEART RATE: 62 BPM | WEIGHT: 241.5 LBS | HEIGHT: 71 IN | RESPIRATION RATE: 20 BRPM | DIASTOLIC BLOOD PRESSURE: 64 MMHG | SYSTOLIC BLOOD PRESSURE: 119 MMHG

## 2022-06-08 LAB
BACTERIA BLD CULT: NO GROWTH
BACTERIA BLD CULT: NO GROWTH
BACTERIA BONE ANAEROBE+AEROBE CULT: ABNORMAL
CREAT SERPL-MCNC: 1.01 MG/DL (ref 0.66–1.25)
GFR SERPL CREATININE-BSD FRML MDRD: 85 ML/MIN/1.73M2
GLUCOSE BLDC GLUCOMTR-MCNC: 157 MG/DL (ref 70–99)
GLUCOSE BLDC GLUCOMTR-MCNC: 173 MG/DL (ref 70–99)
GLUCOSE BLDC GLUCOMTR-MCNC: 203 MG/DL (ref 70–99)

## 2022-06-08 PROCEDURE — 82565 ASSAY OF CREATININE: CPT | Performed by: HOSPITALIST

## 2022-06-08 PROCEDURE — 250N000013 HC RX MED GY IP 250 OP 250 PS 637: Performed by: INTERNAL MEDICINE

## 2022-06-08 PROCEDURE — 250N000011 HC RX IP 250 OP 636: Performed by: PODIATRIST

## 2022-06-08 PROCEDURE — 36415 COLL VENOUS BLD VENIPUNCTURE: CPT | Performed by: INTERNAL MEDICINE

## 2022-06-08 PROCEDURE — 250N000013 HC RX MED GY IP 250 OP 250 PS 637: Performed by: PODIATRIST

## 2022-06-08 PROCEDURE — 99232 SBSQ HOSP IP/OBS MODERATE 35: CPT | Performed by: INTERNAL MEDICINE

## 2022-06-08 RX ORDER — CIPROFLOXACIN 500 MG/1
500 TABLET, FILM COATED ORAL 2 TIMES DAILY
Qty: 20 TABLET | Refills: 0 | Status: SHIPPED | OUTPATIENT
Start: 2022-06-08 | End: 2022-06-18

## 2022-06-08 RX ORDER — APIXABAN 5 MG (74)
KIT ORAL
Qty: 62 EACH | Refills: 0 | Status: SHIPPED | OUTPATIENT
Start: 2022-06-08 | End: 2022-06-22

## 2022-06-08 RX ADMIN — PANTOPRAZOLE SODIUM 40 MG: 40 TABLET, DELAYED RELEASE ORAL at 08:49

## 2022-06-08 RX ADMIN — CEFEPIME HYDROCHLORIDE 2 G: 2 INJECTION, POWDER, FOR SOLUTION INTRAVENOUS at 05:23

## 2022-06-08 RX ADMIN — GABAPENTIN 300 MG: 300 CAPSULE ORAL at 08:48

## 2022-06-08 RX ADMIN — CEFEPIME HYDROCHLORIDE 2 G: 2 INJECTION, POWDER, FOR SOLUTION INTRAVENOUS at 12:54

## 2022-06-08 RX ADMIN — LISINOPRIL 40 MG: 40 TABLET ORAL at 08:49

## 2022-06-08 RX ADMIN — METFORMIN HYDROCHLORIDE 500 MG: 500 TABLET, EXTENDED RELEASE ORAL at 08:49

## 2022-06-08 RX ADMIN — APIXABAN 10 MG: 5 TABLET, FILM COATED ORAL at 08:48

## 2022-06-08 RX ADMIN — FUROSEMIDE 40 MG: 40 TABLET ORAL at 08:49

## 2022-06-08 RX ADMIN — HYDROCHLOROTHIAZIDE 12.5 MG: 12.5 CAPSULE ORAL at 08:49

## 2022-06-08 RX ADMIN — AMLODIPINE BESYLATE 5 MG: 5 TABLET ORAL at 08:49

## 2022-06-08 ASSESSMENT — ACTIVITIES OF DAILY LIVING (ADL)
ADLS_ACUITY_SCORE: 37

## 2022-06-08 NOTE — PLAN OF CARE
Goal Outcome Evaluation:  Discharged to home after stating understanding of discharge medications and instructions. Instructions reviewed with patient and his wife. All belongings packed and sent. New discharge medications to be picked up at [facility].    Overall Patient Progress: improving

## 2022-06-08 NOTE — DISCHARGE SUMMARY
[facility]  Hospitalist Discharge Summary      Date of Admission:  6/3/2022  Date of Discharge:  6/8/2022  Discharging Provider: Uli Drummond MD  Discharge Service: Hospitalist Service    Discharge Diagnoses   Right toe osteomyelitis  Right lower extremity DVT  IDDM II    HTN  Peripheral vascular disease  Neuropathy  HLD  GILA  CVA    Follow-ups Needed After Discharge   Follow-up Appointments     Follow-up and recommended labs and tests       Thank you for choosing [facility]!    Follow up with Dr Osborne at one of the clinic locations within 5-8   days of discharge.    DR. OSBORNE'S CLINIC LOCATIONS:      [address]   [place] [address]  [phone]  Clinic hours:  Monday, Tuesday morning, Thursday, Friday         Follow-up and recommended labs and tests       Follow up with primary care provider, Johann Serna, within 7 days for   hospital follow- up.  The following labs/tests are recommended: CBC.    Please follow-up on the culture from 6/5/2022 and adjust antibiotics.now   discharged on ciprofloxacin and Augmentin for 10 days             Unresulted Labs Ordered in the Past 30 Days of this Admission     Date and Time Order Name Status Description    6/5/2022  8:22 AM Surgical Pathology Exam In process     6/5/2022  8:22 AM Bone Curetting Aerobic Bacterial Culture Routine Preliminary     6/5/2022  8:22 AM Anaerobic Bacterial Culture Routine Preliminary     6/3/2022 12:52 PM Blood Culture Hand, Right Preliminary     6/3/2022 12:52 PM Blood Culture Arm, Right Preliminary       These results will be followed up by Johann Serna      Discharge Disposition   Discharged to home  Condition at discharge: Stable       Hospital Course   Crispin Rojas is a 62 year old gentleman with T2DM, HTN, multiple history of osteomyelitis requiring toe amputation, left second toe amputation presented to the hospital 6/3/2022 with osteomyelitis  of the right toe after MRI performed by podiatry showed grade 2 proximal phalanx osteomyelitis extending to the base.  On 6/5/2022 patient underwent right great toe amputation with full closure by podiatrist  Cezar PEREZ. patient was started on IV cefepime along with vancomycin in the emergency department.  He was also diagnosed with subocclusive thrombus right posterior tibial artery for which was started on Heparin infusion which was changed to apixaban with a loading dose of 10 mg for 1 week followed by 5 mg twice daily.  I decreased the dose of aspirin because of this from 325 to 81 mg daily for peripheral vascular disease  With osteomyelitis was started on cefepime and vancomycin initially in the hospital which were changed to ciprofloxacin and Augmentin as recommended by infectious diseases with the patient growing Pseudomonas, Morganella and staph aureus from the wound culture and another culture Enterococcus.  Patient also had amputation of the osteomyelitis toe    Consultations This Hospital Stay   PHARMACY TO DOSE VANCO  PODIATRY IP CONSULT  PHARMACY TO DOSE VANCO  PHARMACY IP CONSULT  PHARMACY IP CONSULT  PHARMACY IP CONSULT  PHARMACY IP CONSULT  PHARMACY DISCHARGE EDUCATION BY PHARMACIST  PHYSICAL THERAPY ADULT IP CONSULT  PHARMACY IP CONSULT  PHARMACY IP CONSULT  INFECTIOUS DISEASES IP CONSULT    Code Status   Full Code    Time Spent on this Encounter   I, Uli Drummond MD, personally saw the patient today and spent greater than 30 minutes discharging this patient.       Uli Drummond MD  [place] [address] [facility]  [address] [place] [address]  Phone: [phone]  Fax: [phone]  ______________________________________________________________________    Physical Exam   Vital Signs: Temp: 97.4  F (36.3  C) Temp src: Oral BP: 119/64 Pulse: 62   Resp: 20 SpO2: 95 % O2 Device: None (Room air)    Weight: 241 lbs 8 oz  GENERAL: Patient is not  in acute  distress  HEENT:  EOM+,Conjunctiva is clear    NECK:   no Jugular Venous distention  HEART: S1 S2  regular Rate and Rhythm,  there is   no murmur,   LUNGS: Respirations are   not laboured, Lungs are   clear to auscultation   without Crepitations or Wheezing   ABDOMEN: Soft  , there is no  tenderness  ,Bowel Sounds are   Positive   LOWER LIMBS: I did not open the dressing to examine the right foot   CNS:  Alert,  Oriented x 3, Moving all the Four Limbs        Primary Care Physician   Johann Serna    Discharge Orders      Apixaban     Follow-up and recommended labs and tests     Thank you for choosing [facility]!    Follow up with Dr Osborne at one of the clinic locations within 5-8 days of discharge.    DR. OSBORNE'S CLINIC LOCATIONS:      [address]   [place] [address] [phone]  Clinic hours:  Monday, Tuesday morning, Thursday, Friday     Activity    1.  Perform the following activities every 2 hours x 5 minutes:  -ankle ROM/calf massaging bilateral lower extremity.  If you are not comfortable moving the surgical ankle, you can wiggle the toes on that foot  -deep breathing/coughing exercises  -ambulation; keep in mind your weightbearing restrictions    2.  Heel weight bearing in surgical shoe right lower extremity     3.  Apply ice pack to surgical site and behind right knee every 2 hours x 20 minutes.  Do not apply ice pack directly to skin.      4.  Elevate surgical limb at/above hip level 23/24 hours per day for aggressive swelling control, pain control, and to help facilitate incision healing.  This is to be done until sutures are removed.    5.  You can take a shower or bath, but cover the bandage. Keep surgical dressing clean, dry and intact until your first post-operative appointment.     Reason for your hospital stay    You came in 6/3/2022 with osteomyelitis which was operated on 6/5/2022.  Had DVT in the right leg for which Eliquis  apixaban was started.     Follow-up and recommended labs and tests     Follow up with primary care provider, Johann Senra, within 7 days for hospital follow- up.  The following labs/tests are recommended: CBC.  Please follow-up on the culture from 6/5/2022 and adjust antibiotics.now discharged on ciprofloxacin and Augmentin for 10 days     Activity    Your activity upon discharge: As recommended by podiatry     Diet    Follow this diet upon discharge: Orders Placed This Encounter      Advance Diet as Tolerated: Fully Advanced to diet(s) per Provider order; Moderate Consistent Carb (60 g CHO per Meal) Diet       Significant Results and Procedures   Most Recent 3 CBC's:Recent Labs   Lab Test 06/07/22  0646 06/06/22  0627 06/05/22  1335   WBC 5.3 5.0 4.9   HGB 11.3* 11.6* 11.6*   MCV 90 91 90   * 119* 123*     Most Recent 3 BMP's:Recent Labs   Lab Test 06/08/22  0836 06/08/22  0616 06/08/22  0202 06/07/22  2136 06/07/22  0819 06/07/22  0646 06/04/22  1046 06/04/22  0806 06/03/22  1537 06/03/22  1203 01/03/22  0952   NA  --   --   --   --   --   --   --  138  --  140 140   POTASSIUM  --   --   --   --   --   --   --  4.0  --  4.3 4.2   CHLORIDE  --   --   --   --   --   --   --  105  --  105 108   CO2  --   --   --   --   --   --   --  29  --  29 27   BUN  --   --   --   --   --   --   --  20  --  33* 15   CR  --  1.01  --   --   --  1.03  --  0.79  --  0.97 0.87   ANIONGAP  --   --   --   --   --   --   --  4  --  6 5   NARA  --   --   --   --   --   --   --  8.5  --  8.9 8.4*   *  --  173* 156*   < >  --    < > 137*   < > 89 132*    < > = values in this interval not displayed.     Most Recent 2 LFT's:Recent Labs   Lab Test 05/10/21  1555 07/14/20  1633   AST 38 40   ALT 56 58   ALKPHOS 54 59   BILITOTAL 0.5 0.4   ,   Results for orders placed or performed during the hospital encounter of 06/03/22   US Lower Extremity Venous Duplex Right    Narrative    EXAM: US LOWER EXTREMITY VENOUS DUPLEX  RIGHT  LOCATION: [facility]  DATE/TIME: 6/3/2022 9:08 PM    INDICATION: pain edema second lower ext  COMPARISON: None.  TECHNIQUE: Venous Duplex ultrasound of the right lower extremity with and without compression, augmentation and duplex. Color flow and spectral Doppler with waveform analysis performed.    FINDINGS: Exam includes the common femoral, femoral, popliteal, and contralateral common femoral veins as well as segmentally visualized deep calf veins and greater saphenous vein.     RIGHT: Subocclusive thrombus noted within the posterior tibial vein. Otherwise no evidence of DVT within the right lower extremity. No superficial thrombophlebitis. No popliteal cyst.      Impression    IMPRESSION:  1.  Subocclusive DVT within the right posterior tibial vein. Otherwise no evidence of DVT within the right lower extremity.   XR Foot Right 2 Views    Narrative    EXAM: XR FOOT RIGHT 2 VIEWS  LOCATION: [facility]  DATE/TIME: 06/05/2022, 8:54 AM    INDICATION: Status post right great toe amputation.  COMPARISON: None.      Impression    IMPRESSION: Postoperative change amputation of the great toe. Postoperative change amputation of the distal aspect of the fourth toe. No radiographic evidence for osteomyelitis. Hammertoe deformities. Plantar and Achilles calcaneal spurring.           Discharge Medications   Current Discharge Medication List      START taking these medications    Details   amoxicillin-clavulanate (AUGMENTIN) 875-125 MG tablet Take 1 tablet by mouth 2 times daily for 10 days  Qty: 20 tablet, Refills: 0    Associated Diagnoses: Osteomyelitis of great toe of right foot (H)      Apixaban Starter Pack (ELIQUIS DVT/PE STARTER PACK) 5 MG TBPK Take 10 mg by mouth 2 times daily for 6 days, THEN 5 mg 2 times daily for 23 days.  Qty: 62 each, Refills: 0    Associated Diagnoses: Acute deep vein thrombosis (DVT) of proximal vein of right lower extremity (H)      aspirin  (ASA) 81 MG EC tablet Take 1 tablet (81 mg) by mouth daily    Associated Diagnoses: PVD (peripheral vascular disease) (H)      ciprofloxacin (CIPRO) 500 MG tablet Take 1 tablet (500 mg) by mouth 2 times daily for 10 days  Qty: 20 tablet, Refills: 0    Associated Diagnoses: Osteomyelitis of great toe of right foot (H)         CONTINUE these medications which have NOT CHANGED    Details   amLODIPine (NORVASC) 5 MG tablet Take 1 tablet (5 mg) by mouth 2 times daily Take one tablet twice daily  SEE MD THIS QUARTER  Qty: 180 tablet, Refills: 5      atorvastatin (LIPITOR) 40 MG tablet Take 1 tablet (40 mg) by mouth daily Take one tablet daily SEE PROVIDER FOR NEXT REFILL  Qty: 90 tablet, Refills: 1    Associated Diagnoses: Hyperlipidemia LDL goal <100      calcium carbonate (OS-NARA) 500 MG tablet Take 1 tablet by mouth 2 times daily      !! Cholecalciferol (VITAMIN D3 PO) Take 1,000 Units by mouth daily       Co-Enzyme Q10 100 MG CAPS Take 100 mg by mouth daily       collagenase (SANTYL) 250 UNIT/GM external ointment Apply topically daily  Qty: 30 g, Refills: 1    Associated Diagnoses: Diabetic polyneuropathy associated with type 2 diabetes mellitus (H); Diabetic ulcer of other part of right foot associated with type 2 diabetes mellitus, with fat layer exposed (H); History of partial ray amputation of right great toe (H); Amputation of left great toe (H)      furosemide (LASIX) 20 MG tablet Take 1 tablet (20 mg) by mouth daily as needed (for leg swelling) As needed for edema  Qty: 30 tablet, Refills: 3      gabapentin (NEURONTIN) 100 MG capsule Take 300 mg by mouth 3 times daily      Glucagon, rDNA, (GLUCAGON EMERGENCY) 1 MG KIT Inject 1 mg into the muscle once for 1 dose  Qty: 1 kit, Refills: 0    Associated Diagnoses: Type 2 diabetes mellitus with diabetic peripheral angiopathy and gangrene, with long-term current use of insulin (H)      hydrochlorothiazide (MICROZIDE) 12.5 MG capsule Take 1 capsule (12.5 mg) by mouth  every morning  Qty: 90 capsule, Refills: 2    Associated Diagnoses: Benign essential hypertension      insulin aspart (NOVOLOG PEN) 100 UNIT/ML pen Use daily with meals, up to 70 units a day  Qty: 30 mL, Refills: 11      insulin degludec (TRESIBA FLEXTOUCH) 100 UNIT/ML pen 45-55 units subcutaneous daily  Qty: 60 mL, Refills: 1    Associated Diagnoses: Type 2 diabetes mellitus with diabetic peripheral angiopathy and gangrene, with long-term current use of insulin (H)      ketoconazole (NIZORAL) 2 % external shampoo APPLY TOPICALLY DAILY AS NEEDED FOR ITCHING OR IRRITATION. SEE MD AFTER 6 WEEKS.  Qty: 120 mL, Refills: 1    Associated Diagnoses: Tinea capitis due to trichophyton      lisinopril (ZESTRIL) 40 MG tablet Take 1 tablet (40 mg) by mouth daily  Qty: 100 tablet, Refills: 5    Associated Diagnoses: Benign essential hypertension      MAGNESIUM GLYCINATE PLUS PO Take 400 mg by mouth 2 times daily      metFORMIN (GLUCOPHAGE-XR) 500 MG 24 hr tablet TAKE 2 TABLETS BY MOUTH TWICE DAILY WITH MEALS  Qty: 360 tablet, Refills: 1    Associated Diagnoses: 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)      Multiple Vitamins-Minerals (MULTIVITAMIN PO) Take 1 tablet by mouth daily       Omega-3 Fatty Acids (OMEGA-3 FISH OIL PO) Take 1 g by mouth 2 times daily (with meals)       omeprazole (PRILOSEC) 40 MG DR capsule Take 40 mg by mouth daily      potassium chloride ER (KLOR-CON M) 10 MEQ CR tablet Take 1 tablet (10 mEq) by mouth 2 times daily  Qty: 180 tablet, Refills: 1    Associated Diagnoses: Swelling of both lower extremities      primidone (MYSOLINE) 50 MG tablet TAKE 1 TABLET BY MOUTH THREE TIMES DAILY      semaglutide (OZEMPIC, 1 MG/DOSE,) 2 MG/1.5ML pen Inject 1 mg Subcutaneous every 7 days , on Wednesdays  Qty: 6 mL, Refills: 5    Associated Diagnoses: Type 2 diabetes mellitus with diabetic peripheral angiopathy and gangrene, with long-term current use of insulin (H)      silver sulfADIAZINE  (SILVADENE) 1 % external cream Apply topically daily  Qty: 50 g, Refills: 1    Associated Diagnoses: Ulcer of toe, right, limited to breakdown of skin (H); Cellulitis of toe of right foot      tadalafil (CIALIS) 5 MG tablet TAKE 1 TABLET BY MOUTH EVERY DAY AS NEEDED one hour before intercourse  Qty: 30 tablet, Refills: 1    Associated Diagnoses: Erectile dysfunction due to diseases classified elsewhere      !! VITAMIN D PO Take by mouth daily      B-D U/F insulin pen needle USE TO INJECT LANTUS TWICE DAILY AND NOVOLOG THREE TIMES DAILY AS DIRECTED  Qty: 500 each, Refills: 0    Associated Diagnoses: Type 2 diabetes mellitus with peripheral neuropathy (H)      blood glucose (NO BRAND SPECIFIED) lancets standard Use to test blood sugar 3 times daily or as directed.  Qty: 300 each, Refills: 3    Associated Diagnoses: Type 2 diabetes mellitus with diabetic neuropathy, with long-term current use of insulin (H)      blood glucose monitoring (NO BRAND SPECIFIED) meter device kit Use to test blood sugar 3 times daily or as directed.  Qty: 1 kit, Refills: 0    Associated Diagnoses: 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)      blood glucose monitoring (NO BRAND SPECIFIED) test strip Use to test blood sugars 3 times daily or as directed  Qty: 300 strip, Refills: 3    Associated Diagnoses: 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)      Continuous Blood Gluc Sensor (FREESTYLE LUCERO 14 DAY SENSOR) MISC USE ONE EVERY 14 DAYS  Qty: 6 each, Refills: 1    Associated Diagnoses: Type 2 diabetes mellitus with peripheral neuropathy (H)       !! - Potential duplicate medications found. Please discuss with provider.      STOP taking these medications       aspirin (ASA) 325 MG tablet Comments:   Reason for Stopping:             Allergies   Allergies   Allergen Reactions     Bactrim [Sulfamethoxazole W/Trimethoprim] Nausea and Vomiting     Sulfa Drugs Nausea     Niacin Swelling     SIMCOR caused  edema in extremities     Simvastatin Swelling     SIMCOR caused edema in extremities

## 2022-06-08 NOTE — CONSULTS
Consult Date: 06/07/2022    INFECTIOUS DISEASE CONSULTATION    LOCATION:  [facility].    REFERRING PHYSICIAN:  Dr. Drummond.    IMPRESSION:   1.  A 62-year-old male with underlying diabetes, right great toe and diabetic foot infection with great toe recurrent osteomyelitis, status post amputation, failed all major infection resected, no major clinical sepsis, cultures growing Staphylococcus, Morganella and pseudomonas, and operative culture with Enterococcus also, anaerobic culture pending.  2.  Diabetes mellitus.  3.  Active deep venous thrombosis.  4.  Prior history of multiple diabetic foot infections and amputations, only minor peripheral vascular disease.  5.  LISTED SULFA ALLERGY, BUT WAS GI INTOLERANCE ONLY.    RECOMMENDATIONS:  Per Podiatry, confidence high that all major infection has been resected, oral therapy should be acceptable.  Discontinue vancomycin.  Currently getting cefepime, but okay to switch to oral Cipro and Augmentin at disposition.  We will do 10 days of Cipro 500 b.i.d. and Augmentin 875 b.i.d.    HISTORY OF PRESENT ILLNESS:  This 62-year-old male is seen in consultation due to a right great toe osteomyelitis and right foot infection.  The patient has underlying diabetes and with that has had multiple prior infections including amputations.  This includes prior partial distal right great toe amputation.  The patient subsequently did well until recently when he had a minor traumatic event to that area and had a progressive worsening wound.  He actually required sutures with the initial injury, and infection developed and has now progressed.  Imaging suggests residual osteomyelitis, now present and amputation has been accomplished.  He never had major fevers, chills, sweats or systemic symptoms.  He has been on appropriate antibiotics for the microbiology and the foot is improved.  Final pathology is not back from the operative amputation but confidence level is high that all major  infection has been resected.    PAST MEDICAL HISTORY:  Multiple prior diabetic foot infections including amputations, history of diabetes, control level reasonable; new active deep venous thrombosis.    SOCIAL AND FAMILY HISTORY:  No recent travel exposures.  No one else he has been around has been ill.  No family history relevant to the current illness.  No COVID-19 exposures.    ALLERGIES:  AMONG THEM LISTED IS SULFA, BUT WAS GI INTOLERANCE, NOT TRUE ALLERGY.    REVIEW OF SYSTEMS:  Feels well.  No major systemic symptoms.  No significant pain in the foot.  Does have neuropathy.    PHYSICAL EXAMINATION:   GENERAL:  The patient appears his stated age, not look particularly toxic or ill.  VITAL SIGNS:  Normal including being afebrile.  HEENT:  No thrush or oropharyngeal lesions.  HEART AND LUNGS:  Unremarkable.  ABDOMEN:  Soft and nontender.   EXTREMITIES:  Some minor edema.  No significant proximal cellulitis.  The toe itself is wrapped.  His pictures note some minor cellulitis was present.    LABORATORY DATA:  Cultures growing sensitive Staphylococcus aureus, Morganella and pseudomonas that is quinolone sensitive on the preoperative culture.  Operative culture had similar microbiology, also including enterococcus.    Thank you very much for the consultation.  I will follow the patient with you.    Carlos A Sharma MD        D: 2022   T: 2022   MT: JUAN    Name:     CEDRIC LAW  MRN:      [id number]        Account:      [id number]   :      1960           Consult Date: 2022     Document: [id number]

## 2022-06-08 NOTE — PROGRESS NOTES
Care Management Discharge Note    Discharge Date: 06/08/2022       Discharge Disposition:  home    Discharge Services:  none    Additional Information:  Pt identified as a Service Bundle #2. No needs or assessment needed at this time. Please consult CM/SW  if discharge needs should arise.          Elina Neumann RN BSN CM  Inpatient Care Coordination  [facility]  [phone]

## 2022-06-08 NOTE — PROGRESS NOTES
[facility]  Infectious Disease Progress Note          Assessment and Plan:   IMPRESSION:   1.  A 62-year-old male with underlying diabetes, right great toe and diabetic foot infection with great toe recurrent osteomyelitis, status post amputation, failed all major infection resected, no major clinical sepsis, cultures growing Staphylococcus, Morganella and pseudomonas, and operative culture with Enterococcus also, anaerobic culture pending.  2.  Diabetes mellitus.  3.  Active deep venous thrombosis.  4.  Prior history of multiple diabetic foot infections and amputations, only minor peripheral vascular disease.  5.  LISTED SULFA ALLERGY, BUT WAS GI INTOLERANCE ONLY.     RECOMMENDATIONS:  Per Podiatry, confidence high that all major infection has been resected, oral therapy should be acceptable.  Discontinue vancomycin.  Currently getting cefepime, but okay to switch to oral Cipro and Augmentin at disposition.  Plan 10 days of Cipro 500 b.i.d. and Augmentin 875 b.i.d.           Interval History:   no new complaints and doing well; no cp, sob, n/v/d, or abd pain. cx same foot Ok              Medications:       amLODIPine  5 mg Oral BID     apixaban ANTICOAGULANT  10 mg Oral BID     atorvastatin  20 mg Oral At Bedtime     ceFEPIme (MAXIPIME) IV  2 g Intravenous Q8H     furosemide  40 mg Oral Daily     gabapentin  300 mg Oral TID     hydrochlorothiazide  12.5 mg Oral QAM     insulin aspart  1-3 Units Subcutaneous TID AC     insulin aspart  1-3 Units Subcutaneous At Bedtime     insulin aspart   Subcutaneous TID AC     insulin degludec  20 Units Subcutaneous QAM     lisinopril  40 mg Oral Daily     metFORMIN  500 mg Oral BID w/meals     pantoprazole  40 mg Oral Daily     sodium chloride (PF)  3 mL Intracatheter Q8H     sodium chloride (PF)  3 mL Intracatheter Q8H                  Physical Exam:   Blood pressure 119/64, pulse 62, temperature 97.4  F (36.3  C), temperature source Oral, resp. rate 20,  "height 1.803 m (5' 11\"), weight 109.5 kg (241 lb 8 oz), SpO2 95 %.  Wt Readings from Last 2 Encounters:   06/08/22 109.5 kg (241 lb 8 oz)   05/10/22 112 kg (247 lb)     Vital Signs with Ranges  Temp:  [97.4  F (36.3  C)-98  F (36.7  C)] 97.4  F (36.3  C)  Pulse:  [52-62] 62  Resp:  [18-20] 20  BP: (119-129)/(54-64) 119/64  SpO2:  [94 %-97 %] 95 %    Constitutional: Awake, alert, cooperative, no apparent distress   Lungs: Clear to auscultation bilaterally, no crackles or wheezing   Cardiovascular: Regular rate and rhythm, normal S1 and S2, and no murmur noted   Abdomen: Normal bowel sounds, soft, non-distended, non-tender   Skin: No rashes, no cyanosis, no edema feet covered   Other:           Data:   All microbiology laboratory data reviewed.  Recent Labs   Lab Test 06/07/22  0646 06/06/22  0627 06/05/22  1335   WBC 5.3 5.0 4.9   HGB 11.3* 11.6* 11.6*   HCT 35.9* 36.9* 36.6*   MCV 90 91 90   * 119* 123*     Recent Labs   Lab Test 06/08/22  0616 06/07/22  0646 06/04/22  0806   CR 1.01 1.03 0.79     Recent Labs   Lab Test 06/03/22  1203   SED 37*     Recent Labs   Lab Test 04/20/21  0925 04/20/21  0825 11/07/19  1829 11/07/19  1816 10/31/19  0229 10/31/19  0213 08/02/19  1714 06/24/19  2002 06/24/19  1956   CULT No growth No growth No growth No growth No growth No growth No anaerobes isolated  Light growth  Streptococcus dysgalactiae serogroup C/G  *  Light growth  Staphylococcus aureus  * No growth No growth       "

## 2022-06-08 NOTE — PLAN OF CARE
Goal Outcome Evaluation:    Plan of Care Reviewed With: patient     Overall Patient Progress: improving    VSS but patient is bradycardic. Pt A&Ox4 on RA.  Heparin completed and Eliquis given. Dressing on right toe CDI.  PIV in right hand SL. Pt states numbness in feet at baseline CMS intact to right foot.

## 2022-06-08 NOTE — PLAN OF CARE
A/Ox4. Ind in room. VSS but NC: 53. D/C'ed heparin and start Eliquist. B. R toe amputation. Numbness in all extremities, pt baseline. Continue POC.

## 2022-06-08 NOTE — DISCHARGE INSTRUCTIONS
Change foot dressing as instructed by Podiatry  Wear boot to right foot when up    Follow up with Dr. Molina on 06/09

## 2022-06-09 ENCOUNTER — TELEPHONE (OUTPATIENT)
Dept: PALLIATIVE MEDICINE | Facility: CLINIC | Age: 62
End: 2022-06-09

## 2022-06-09 ENCOUNTER — OFFICE VISIT (OUTPATIENT)
Dept: PODIATRY | Facility: CLINIC | Age: 62
End: 2022-06-09
Payer: COMMERCIAL

## 2022-06-09 VITALS
BODY MASS INDEX: 33.74 KG/M2 | HEIGHT: 71 IN | DIASTOLIC BLOOD PRESSURE: 72 MMHG | WEIGHT: 241 LBS | SYSTOLIC BLOOD PRESSURE: 128 MMHG

## 2022-06-09 DIAGNOSIS — Z09 SURGERY FOLLOW-UP EXAMINATION: Primary | ICD-10-CM

## 2022-06-09 DIAGNOSIS — E11.42 TYPE 2 DIABETES MELLITUS WITH PERIPHERAL NEUROPATHY (H): ICD-10-CM

## 2022-06-09 DIAGNOSIS — M86.9 OSTEOMYELITIS OF GREAT TOE OF RIGHT FOOT (H): ICD-10-CM

## 2022-06-09 DIAGNOSIS — Z89.411 STATUS POST AMPUTATION OF RIGHT GREAT TOE (H): ICD-10-CM

## 2022-06-09 LAB
PATH REPORT.COMMENTS IMP SPEC: NORMAL
PATH REPORT.COMMENTS IMP SPEC: NORMAL
PATH REPORT.FINAL DX SPEC: NORMAL
PATH REPORT.GROSS SPEC: NORMAL
PATH REPORT.MICROSCOPIC SPEC OTHER STN: NORMAL
PATH REPORT.RELEVANT HX SPEC: NORMAL
PHOTO IMAGE: NORMAL

## 2022-06-09 PROCEDURE — 99213 OFFICE O/P EST LOW 20 MIN: CPT | Performed by: PODIATRIST

## 2022-06-09 NOTE — TELEPHONE ENCOUNTER
Seen for intitial eval 03/22- follpw up with Dr Xena GARCIAN, RN Care Coordinator  [facility]  Pain Management

## 2022-06-09 NOTE — LETTER
6/9/2022         RE: Crispin Rojas  [address] [facility] [address]        Dear Colleague,    Thank you for referring your patient, Crispin Rojas, to the [facility]OD[facility]. Please see a copy of my visit note below.    ASSESSMENT:  Encounter Diagnoses   Name Primary?     Surgery follow-up examination Yes     Status post amputation of right great toe (H)      Osteomyelitis of great toe of right foot (H)      Type 2 diabetes mellitus with peripheral neuropathy (H)      MEDICAL DECISION MAKING:  A sterile redress of the right foot was performed.  I cautioned him about overactivity, given the edema and strain on the incision.  He is encouraged to keep activity low and elevate his foot.  I think driving is fine, as long as he wears a surgical shoe not an immobilizing boot.  I told him, that in my opinion, I do not think he should be working.  This puts him at risk for wound healing complications.  He stated understanding.  Complete the antibiotics    Follow-up as scheduled.  We will plan on leaving sutures in for at least 3 weeks.    Disclaimer: This note consists of symbols derived from keyboarding, dictation and/or voice recognition software. As a result, there may be errors in the script that have gone undetected. Please consider this when interpreting information found in this chart.    Valentino Moilna DPM, FACFAS, MS    [place] Department of Podiatry/Foot & Ankle Surgery      ____________________________________________________________________    HPI:       Pat follows up status post amputation of the residual right great toe.  He was hospitalized at that time.  Discharged on ciprofloxacin and Augmentin  No complaints.  He asked about driving and working.    POSTOPERATIVE DIAGNOSES:     1.  Diabetes mellitus with peripheral neuropathy.  2.  Previous partial right great toe amputation.  2.  Osteomyelitis, right great toe proximal phalanx.     PROCEDURES:    1.  Right great toe  "amputation with disarticulation at the first metatarsophalangeal joint.  2.  A 5 x 3 cm flap closure of the wound.     Past Medical History:   Diagnosis Date     Cerebral infarction (H)     2010     Chronic infection      Hyperlipidemia LDL goal <70      Hypertension      Numbness and tingling      Obesity      GILA (obstructive sleep apnea) 10/22/2018    CPAP intolerant     PVD (peripheral vascular disease) (H)     s/p left partial toe amputation     Renal stone      Tremor      Type 2 diabetes mellitus with diabetic polyneuropathy, with long-term current use of insulin (H)      EXAM:    Vitals: /72   Ht 1.803 m (5' 11\")   Wt 109.3 kg (241 lb)   BMI 33.61 kg/m    BMI: Body mass index is 33.61 kg/m .    Constitutional:  Crispin Rojas is in no apparent distress, appears well-nourished.  Cooperative with history and physical exam.    Vascular: Moderate right medial forefoot edema.    Derm: Incision is coapted.  Sutures intact.  There does appear to be some edema and stress or strain on the incision.  No dehiscence.        Again, thank you for allowing me to participate in the care of your patient.        Sincerely,        Valentino Molina, SHRUTHI    "

## 2022-06-09 NOTE — PROGRESS NOTES
ASSESSMENT:  Encounter Diagnoses   Name Primary?     Surgery follow-up examination Yes     Status post amputation of right great toe (H)      Osteomyelitis of great toe of right foot (H)      Type 2 diabetes mellitus with peripheral neuropathy (H)      MEDICAL DECISION MAKING:  A sterile redress of the right foot was performed.  I cautioned him about overactivity, given the edema and strain on the incision.  He is encouraged to keep activity low and elevate his foot.  I think driving is fine, as long as he wears a surgical shoe not an immobilizing boot.  I told him, that in my opinion, I do not think he should be working.  This puts him at risk for wound healing complications.  He stated understanding.  Complete the antibiotics    Follow-up as scheduled.  We will plan on leaving sutures in for at least 3 weeks.    Disclaimer: This note consists of symbols derived from keyboarding, dictation and/or voice recognition software. As a result, there may be errors in the script that have gone undetected. Please consider this when interpreting information found in this chart.    Valentino Molina DPM, FACGAEL, MS    [place] Department of Podiatry/Foot & Ankle Surgery      ____________________________________________________________________    HPI:       Pat follows up status post amputation of the residual right great toe.  He was hospitalized at that time.  Discharged on ciprofloxacin and Augmentin  No complaints.  He asked about driving and working.    POSTOPERATIVE DIAGNOSES:     1.  Diabetes mellitus with peripheral neuropathy.  2.  Previous partial right great toe amputation.  2.  Osteomyelitis, right great toe proximal phalanx.     PROCEDURES:    1.  Right great toe amputation with disarticulation at the first metatarsophalangeal joint.  2.  A 5 x 3 cm flap closure of the wound.     Past Medical History:   Diagnosis Date     Cerebral infarction (H)     2010     Chronic infection      Hyperlipidemia LDL goal <70       "Hypertension      Numbness and tingling      Obesity      GILA (obstructive sleep apnea) 10/22/2018    CPAP intolerant     PVD (peripheral vascular disease) (H)     s/p left partial toe amputation     Renal stone      Tremor      Type 2 diabetes mellitus with diabetic polyneuropathy, with long-term current use of insulin (H)      EXAM:    Vitals: /72   Ht 1.803 m (5' 11\")   Wt 109.3 kg (241 lb)   BMI 33.61 kg/m    BMI: Body mass index is 33.61 kg/m .    Constitutional:  Crispin Rojas is in no apparent distress, appears well-nourished.  Cooperative with history and physical exam.    Vascular: Moderate right medial forefoot edema.    Derm: Incision is coapted.  Sutures intact.  There does appear to be some edema and stress or strain on the incision.  No dehiscence.    "

## 2022-06-10 ENCOUNTER — TELEPHONE (OUTPATIENT)
Dept: ENDOCRINOLOGY | Facility: CLINIC | Age: 62
End: 2022-06-10

## 2022-06-10 DIAGNOSIS — E11.42 TYPE 2 DIABETES MELLITUS WITH PERIPHERAL NEUROPATHY (H): ICD-10-CM

## 2022-06-10 NOTE — TELEPHONE ENCOUNTER
[facility] Call Center    Phone Message    May a detailed message be left on voicemail: yes     Reason for Call: Medication Refill Request    Has the patient contacted the pharmacy for the refill? Yes   Name of medication being requested: B-D U/F insulin pen needle    Provider who prescribed the medication: [facility]    Pharmacy: [address] [place] [address] - [facility]    Date medication is needed: ASAP- pt stated he forgot his pen needles at home and he is on vacation.  Please refill ASAP    Action Taken: Other: endo    Travel Screening: Not Applicable

## 2022-06-12 LAB — BACTERIA BONE ANAEROBE+AEROBE CULT: NORMAL

## 2022-06-13 ENCOUNTER — PATIENT OUTREACH (OUTPATIENT)
Dept: FAMILY MEDICINE | Facility: CLINIC | Age: 62
End: 2022-06-13
Payer: COMMERCIAL

## 2022-06-13 RX ORDER — FLURBIPROFEN SODIUM 0.3 MG/ML
SOLUTION/ DROPS OPHTHALMIC
Qty: 500 EACH | Refills: 0 | Status: SHIPPED | OUTPATIENT
Start: 2022-06-13 | End: 2022-11-18

## 2022-06-15 NOTE — TELEPHONE ENCOUNTER
"Hospital/TCU/ED for chronic condition Discharge Protocol    \"Hi, my name is Bee Nogueira RN, a registered nurse, and I am calling from [facility].  I am calling to follow up and see how things are going for you after your recent emergency visit/hospital/TCU stay.\"    Tell me how you are doing now that you are home?\"   Right great toe amputation  \"Have had some bleeding since discharge but it's not much\"  \"Having some cramping in my legs\"  \"still working\" \"I just wear a hard cast when I'm working\".      Discharge Instructions    \"Let's review your discharge instructions.  What is/are the follow-up recommendations?  Pt. Response: see my primary and the surgeon.    \"Has an appointment with your primary care provider been scheduled?\"   6/22/2022 with Primary Care Provider  Dr. Molina with podiatry 6/24/2022.    \"When you see the provider, I would recommend that you bring your medications with you.\"    Medications    \"Tell me what changed about your medicines when you discharged?\"    Changes to chronic meds?    0-1  Stop aspirin 325 and start 81mg daily    \"What questions do you have about your medications?\"    None     New diagnoses of heart failure, COPD, diabetes, or MI?    No     On insulin: \"Did you start on insulin in the hospital or did you have your insulin dose changed?\"  No         Post Discharge Medication Reconciliation Status: discharge medications reconciled, continue medications without change.    Was MTM referral placed (*Make sure to put transitions as reason for referral)?   No    Call Summary    \"What questions or concerns do you have about your recent visit and your follow-up care?\"     none    Bee Nogueira RN              "

## 2022-06-22 ENCOUNTER — OFFICE VISIT (OUTPATIENT)
Dept: FAMILY MEDICINE | Facility: CLINIC | Age: 62
End: 2022-06-22
Payer: COMMERCIAL

## 2022-06-22 VITALS
OXYGEN SATURATION: 100 % | DIASTOLIC BLOOD PRESSURE: 58 MMHG | TEMPERATURE: 97.7 F | BODY MASS INDEX: 34.45 KG/M2 | SYSTOLIC BLOOD PRESSURE: 150 MMHG | HEART RATE: 44 BPM | WEIGHT: 247 LBS | RESPIRATION RATE: 14 BRPM

## 2022-06-22 DIAGNOSIS — E78.5 HYPERLIPIDEMIA LDL GOAL <100: ICD-10-CM

## 2022-06-22 DIAGNOSIS — Z86.718 ENCOUNTER FOR FOLLOW-UP OF ACUTE DEEP VEIN THROMBOSIS (DVT) OF RIGHT LOWER EXTREMITY: ICD-10-CM

## 2022-06-22 DIAGNOSIS — D64.9 ANEMIA, UNSPECIFIED TYPE: ICD-10-CM

## 2022-06-22 DIAGNOSIS — Z09 ENCOUNTER FOR FOLLOW-UP OF ACUTE DEEP VEIN THROMBOSIS (DVT) OF RIGHT LOWER EXTREMITY: ICD-10-CM

## 2022-06-22 DIAGNOSIS — M86.9 OSTEOMYELITIS OF GREAT TOE OF RIGHT FOOT (H): ICD-10-CM

## 2022-06-22 LAB
ERYTHROCYTE [DISTWIDTH] IN BLOOD BY AUTOMATED COUNT: 13.5 % (ref 10–15)
HCT VFR BLD AUTO: 35.4 % (ref 40–53)
HGB BLD-MCNC: 11.6 G/DL (ref 13.3–17.7)
MCH RBC QN AUTO: 29.4 PG (ref 26.5–33)
MCHC RBC AUTO-ENTMCNC: 32.8 G/DL (ref 31.5–36.5)
MCV RBC AUTO: 90 FL (ref 78–100)
PLATELET # BLD AUTO: 151 10E3/UL (ref 150–450)
RBC # BLD AUTO: 3.95 10E6/UL (ref 4.4–5.9)
WBC # BLD AUTO: 4.5 10E3/UL (ref 4–11)

## 2022-06-22 PROCEDURE — 83550 IRON BINDING TEST: CPT | Performed by: FAMILY MEDICINE

## 2022-06-22 PROCEDURE — 85027 COMPLETE CBC AUTOMATED: CPT | Performed by: FAMILY MEDICINE

## 2022-06-22 PROCEDURE — 36415 COLL VENOUS BLD VENIPUNCTURE: CPT | Performed by: FAMILY MEDICINE

## 2022-06-22 PROCEDURE — 82728 ASSAY OF FERRITIN: CPT | Performed by: FAMILY MEDICINE

## 2022-06-22 PROCEDURE — 80061 LIPID PANEL: CPT | Performed by: FAMILY MEDICINE

## 2022-06-22 PROCEDURE — 99214 OFFICE O/P EST MOD 30 MIN: CPT | Performed by: FAMILY MEDICINE

## 2022-06-22 RX ORDER — APIXABAN 5 MG/1
5 TABLET, FILM COATED ORAL 2 TIMES DAILY
COMMUNITY
Start: 2022-06-09 | End: 2022-07-06

## 2022-06-22 ASSESSMENT — ANXIETY QUESTIONNAIRES
8. IF YOU CHECKED OFF ANY PROBLEMS, HOW DIFFICULT HAVE THESE MADE IT FOR YOU TO DO YOUR WORK, TAKE CARE OF THINGS AT HOME, OR GET ALONG WITH OTHER PEOPLE?: NOT DIFFICULT AT ALL
7. FEELING AFRAID AS IF SOMETHING AWFUL MIGHT HAPPEN: NOT AT ALL
6. BECOMING EASILY ANNOYED OR IRRITABLE: NOT AT ALL
2. NOT BEING ABLE TO STOP OR CONTROL WORRYING: NOT AT ALL
4. TROUBLE RELAXING: NOT AT ALL
1. FEELING NERVOUS, ANXIOUS, OR ON EDGE: NOT AT ALL
GAD7 TOTAL SCORE: 0
GAD7 TOTAL SCORE: 0
5. BEING SO RESTLESS THAT IT IS HARD TO SIT STILL: NOT AT ALL
GAD7 TOTAL SCORE: 0
7. FEELING AFRAID AS IF SOMETHING AWFUL MIGHT HAPPEN: NOT AT ALL
3. WORRYING TOO MUCH ABOUT DIFFERENT THINGS: NOT AT ALL

## 2022-06-22 ASSESSMENT — PATIENT HEALTH QUESTIONNAIRE - PHQ9
SUM OF ALL RESPONSES TO PHQ QUESTIONS 1-9: 1
SUM OF ALL RESPONSES TO PHQ QUESTIONS 1-9: 1
10. IF YOU CHECKED OFF ANY PROBLEMS, HOW DIFFICULT HAVE THESE PROBLEMS MADE IT FOR YOU TO DO YOUR WORK, TAKE CARE OF THINGS AT HOME, OR GET ALONG WITH OTHER PEOPLE: SOMEWHAT DIFFICULT

## 2022-06-22 NOTE — PATIENT INSTRUCTIONS
Discuss DVT treatment length with podiatry. Usually, it is about 3 months depending on reassessment. I will review your studies via [email or web address] when they are available. If you have any questions or concerns please let me know via [employer], or call the clinic.

## 2022-06-22 NOTE — PROGRESS NOTES
"  Assessment & Plan   See after visit summary and result note from studies for helpful information and advice given to patient.    Osteomyelitis of great toe of right foot (H)      Encounter for follow-up of acute deep vein thrombosis (DVT) of right lower extremity      Anemia, unspecified type    - CBC with platelets  - Iron and iron binding capacity  - Ferritin  - Iron and iron binding capacity  - Ferritin    Hyperlipidemia LDL goal <100    - Lipid Profile               BMI:   Estimated body mass index is 34.45 kg/m  as calculated from the following:    Height as of 6/9/22: 1.803 m (5' 11\").    Weight as of this encounter: 112 kg (247 lb).           Return in about 2 weeks (around 7/6/2022) for Routine preventive, with me.    Johann Serna, DO  [facility] CECILIA Dimas is a 62 year old, presenting for the following health issues:  Hospital F/U (6/3/22-6/8/22  Right foot surgery)      History of Present Illness       Reason for visit:  Surgery follow up    He eats 2-3 servings of fruits and vegetables daily.He consumes 0 sweetened beverage(s) daily.He exercises with enough effort to increase his heart rate 60 or more minutes per day.  He exercises with enough effort to increase his heart rate 5 days per week.   He is taking medications regularly.    Today's PHQ-9         PHQ-9 Total Score: 1    PHQ-9 Q9 Thoughts of better off dead/self-harm past 2 weeks :   Not at all    How difficult have these problems made it for you to do your work, take care of things at home, or get along with other people: Somewhat difficult  Today's MICHELLE-7 Score: 0     Follow up lab results from the hospital. Possible oncologist referral.     Hospital Follow-up Visit:    Hospital/Nursing Home/IP Rehab Facility: [facility]  Date of Admission: 6/3/22  Date of Discharge: 6/8/22  Reason(s) for Admission:   Right toe osteomyelitis  Right lower extremity DVT  IDDM II    Was your hospitalization " related to COVID-19? No   Problems taking medications regularly:  None  Medication changes since discharge: Antibiotic treatments completed  Problems adhering to non-medication therapy:  None    Summary of hospitalization:  [facility] hospital discharge summary reviewed  Diagnostic Tests/Treatments reviewed.  Follow up needed: Is following up with podiatry, Dr Molina  Other Healthcare Providers Involved in Patient s Care:         Surgical follow-up appointment - With podiatry  Update since discharge: improved.       Post Discharge Medication Reconciliation: discharge medications reconciled and changed, per note/orders.  Plan of care communicated with patient               Review of Systems   No new physical or mental health concerns from hospital discharge.       Objective    BP (!) 150/58   Pulse (!) 44   Temp 97.7  F (36.5  C) (Tympanic)   Resp 14   Wt 112 kg (247 lb)   SpO2 100%   BMI 34.45 kg/m    Body mass index is 34.45 kg/m .   JLW    Physical Exam   Vital signs reviewed.  Patient is in no acute appearing distress.  Breathing appears nonlabored.  Patient is alert and oriented ×3.  Patient is very pleasant, making good eye contact and responding with clear fluent speech.    Heart: Heart rate is regular rhythm, bradycardic without murmur.    Lungs: Lungs are clear to auscultation with good airflow bilaterally.    No right calf tenderness on extremity which had the recent osteomyelitis and DVT conditions managed.     Dressings were left in place over recent osteomyelitis treatment area.                    .  ..

## 2022-06-23 LAB
CHOLEST SERPL-MCNC: 113 MG/DL
FASTING STATUS PATIENT QL REPORTED: NORMAL
FERRITIN SERPL-MCNC: 62 NG/ML (ref 26–388)
HDLC SERPL-MCNC: 47 MG/DL
IRON SATN MFR SERPL: 15 % (ref 15–46)
IRON SERPL-MCNC: 59 UG/DL (ref 35–180)
LDLC SERPL CALC-MCNC: 54 MG/DL
NONHDLC SERPL-MCNC: 66 MG/DL
TIBC SERPL-MCNC: 383 UG/DL (ref 240–430)
TRIGL SERPL-MCNC: 58 MG/DL

## 2022-06-24 ENCOUNTER — OFFICE VISIT (OUTPATIENT)
Dept: PODIATRY | Facility: CLINIC | Age: 62
End: 2022-06-24
Payer: COMMERCIAL

## 2022-06-24 VITALS
HEIGHT: 71 IN | WEIGHT: 247 LBS | BODY MASS INDEX: 34.58 KG/M2 | SYSTOLIC BLOOD PRESSURE: 138 MMHG | DIASTOLIC BLOOD PRESSURE: 60 MMHG

## 2022-06-24 DIAGNOSIS — Z09 SURGERY FOLLOW-UP EXAMINATION: Primary | ICD-10-CM

## 2022-06-24 DIAGNOSIS — T81.30XA WOUND DEHISCENCE: ICD-10-CM

## 2022-06-24 PROCEDURE — 99024 POSTOP FOLLOW-UP VISIT: CPT | Performed by: PODIATRIST

## 2022-06-24 NOTE — PROGRESS NOTES
ASSESSMENT:  Encounter Diagnoses   Name Primary?     Surgery follow-up examination Yes     Wound dehiscence      MEDICAL DECISION MAKING:  Postoperative course complicated by some wound dehiscence at the medial aspect of the incision.  There are no clinical signs of infection.  At this point we will focus on healing by secondary intention.  The more lateral sutures were removed.  Should this wound not heal, then residual osteomyelitis versus new acute osteomyelitis is considered.  No indication for excisional debridement today.    The area was cleansed, Betadine applied, followed by a compressive gauze dressing.    Encourage him to continue with the offloading shoes.    Will cleanse the wound daily with micro cleanse, blot dry, apply Betadine and a gauze dressing.    Follow-up in 1 to 2 weeks.  If the wound does not show interval healing, or if there are other concerns, will consider imaging.    Disclaimer: This note consists of symbols derived from keyboarding, dictation and/or voice recognition software. As a result, there may be errors in the script that have gone undetected. Please consider this when interpreting information found in this chart.    Valentino Molina DPM, FACFAS, MS    [place] Department of Podiatry/Foot & Ankle Surgery      ____________________________________________________________________    HPI:       Pat follows up status post amputation of the residual right great toe, 6/5/2022.  He was hospitalized at that time.  Discharged on ciprofloxacin and Augmentin  He reports some persistent drainage from the medial aspect of the incision.  He wears a surgical shoe for driving and a forefoot offloading shoe for walking    POSTOPERATIVE DIAGNOSES:     1.  Diabetes mellitus with peripheral neuropathy.  2.  Previous partial right great toe amputation.  2.  Osteomyelitis, right great toe proximal phalanx.     PROCEDURES:    1.  Right great toe amputation with disarticulation at the first  "metatarsophalangeal joint.  2.  A 5 x 3 cm flap closure of the wound.  Past Medical History:   Diagnosis Date     Cerebral infarction (H)     2010     Chronic infection      Hyperlipidemia LDL goal <70      Hypertension      Numbness and tingling      Obesity      GILA (obstructive sleep apnea) 10/22/2018    CPAP intolerant     PVD (peripheral vascular disease) (H)     s/p left partial toe amputation     Renal stone      Tremor      Type 2 diabetes mellitus with diabetic polyneuropathy, with long-term current use of insulin (H)    *  *    EXAM:    Vitals: /60 (BP Location: Right arm, Patient Position: Chair)   Ht 1.803 m (5' 11\")   Wt 112 kg (247 lb)   BMI 34.45 kg/m    BMI: Body mass index is 34.45 kg/m .    Constitutional:  Crispin Rojas is in no apparent distress, appears well-nourished.  Cooperative with history and physical exam.    Derm: Three quarters of this incision appears to have healed more so laterally.  There is an area of wound dehiscence approximately 1 to 1.65 cm in length along the medial aspect of the incision.  This probes up to 5 mm deep.  I did not contact bone.  No malodor.  No purulence.  No erythema.  The base appeared viable/vascular.      "

## 2022-06-24 NOTE — LETTER
6/24/2022         RE: Crispin Rojas  [address] [facility] [address]        Dear Colleague,    Thank you for referring your patient, Crispin Rojas, to the [facility]. Please see a copy of my visit note below.    ASSESSMENT:  Encounter Diagnoses   Name Primary?     Surgery follow-up examination Yes     Wound dehiscence      MEDICAL DECISION MAKING:  Postoperative course complicated by some wound dehiscence at the medial aspect of the incision.  There are no clinical signs of infection.  At this point we will focus on healing by secondary intention.  The more lateral sutures were removed.  Should this wound not heal, then residual osteomyelitis versus new acute osteomyelitis is considered.  No indication for excisional debridement today.    The area was cleansed, Betadine applied, followed by a compressive gauze dressing.    Encourage him to continue with the offloading shoes.    Will cleanse the wound daily with micro cleanse, blot dry, apply Betadine and a gauze dressing.    Follow-up in 1 to 2 weeks.  If the wound does not show interval healing, or if there are other concerns, will consider imaging.    Disclaimer: This note consists of symbols derived from keyboarding, dictation and/or voice recognition software. As a result, there may be errors in the script that have gone undetected. Please consider this when interpreting information found in this chart.    Valentino Molina DPM, FACFAS, MS    [place] Department of Podiatry/Foot & Ankle Surgery      ____________________________________________________________________    HPI:       Pat follows up status post amputation of the residual right great toe, 6/5/2022.  He was hospitalized at that time.  Discharged on ciprofloxacin and Augmentin  He reports some persistent drainage from the medial aspect of the incision.  He wears a surgical shoe for driving and a forefoot offloading shoe for walking    POSTOPERATIVE DIAGNOSES:  "    1.  Diabetes mellitus with peripheral neuropathy.  2.  Previous partial right great toe amputation.  2.  Osteomyelitis, right great toe proximal phalanx.     PROCEDURES:    1.  Right great toe amputation with disarticulation at the first metatarsophalangeal joint.  2.  A 5 x 3 cm flap closure of the wound.  Past Medical History:   Diagnosis Date     Cerebral infarction (H)     2010     Chronic infection      Hyperlipidemia LDL goal <70      Hypertension      Numbness and tingling      Obesity      GILA (obstructive sleep apnea) 10/22/2018    CPAP intolerant     PVD (peripheral vascular disease) (H)     s/p left partial toe amputation     Renal stone      Tremor      Type 2 diabetes mellitus with diabetic polyneuropathy, with long-term current use of insulin (H)    *  *    EXAM:    Vitals: /60 (BP Location: Right arm, Patient Position: Chair)   Ht 1.803 m (5' 11\")   Wt 112 kg (247 lb)   BMI 34.45 kg/m    BMI: Body mass index is 34.45 kg/m .    Constitutional:  Crispin Rojas is in no apparent distress, appears well-nourished.  Cooperative with history and physical exam.    Derm: Three quarters of this incision appears to have healed more so laterally.  There is an area of wound dehiscence approximately 1 to 1.65 cm in length along the medial aspect of the incision.  This probes up to 5 mm deep.  I did not contact bone.  No malodor.  No purulence.  No erythema.  The base appeared viable/vascular.          Again, thank you for allowing me to participate in the care of your patient.        Sincerely,        Valentino Molina, SHRUTHI    "

## 2022-06-29 ENCOUNTER — OFFICE VISIT (OUTPATIENT)
Dept: PODIATRY | Facility: CLINIC | Age: 62
End: 2022-06-29
Payer: COMMERCIAL

## 2022-06-29 VITALS
HEIGHT: 71 IN | BODY MASS INDEX: 34.58 KG/M2 | SYSTOLIC BLOOD PRESSURE: 112 MMHG | WEIGHT: 247 LBS | DIASTOLIC BLOOD PRESSURE: 70 MMHG

## 2022-06-29 DIAGNOSIS — Z09 SURGERY FOLLOW-UP EXAMINATION: ICD-10-CM

## 2022-06-29 DIAGNOSIS — T81.30XA WOUND DEHISCENCE: Primary | ICD-10-CM

## 2022-06-29 DIAGNOSIS — E11.42 TYPE 2 DIABETES MELLITUS WITH PERIPHERAL NEUROPATHY (H): ICD-10-CM

## 2022-06-29 PROCEDURE — 99024 POSTOP FOLLOW-UP VISIT: CPT | Performed by: PODIATRIST

## 2022-06-29 NOTE — PROGRESS NOTES
ASSESSMENT:  Encounter Diagnoses   Name Primary?     Wound dehiscence Yes     Surgery follow-up examination      Type 2 diabetes mellitus with peripheral neuropathy (H)      MEDICAL DECISION MAKING:  The wound appears larger in surface area.  The base is more vascular.  No clinical signs of infection.    Continue current wound cares: Micro cleanse, Betadine, gauze dressing  Continue offloading: Surgical shoe for driving, cam walker for ambulation  Await radiology report  I am somewhat concerned about a radiolucency seen deep to the area of dehiscence.  An MRI for evaluation of first metatarsal head osteomyelitis might be needed.  Follow-up in 1 to 2 weeks.    Disclaimer: This note consists of symbols derived from keyboarding, dictation and/or voice recognition software. As a result, there may be errors in the script that have gone undetected. Please consider this when interpreting information found in this chart.    Valentino Molina DPM, FACGAEL, MS    [place] Department of Podiatry/Foot & Ankle Surgery      ____________________________________________________________________    HPI:       Pat follows up status post amputation of the residual right great toe, 6/5/2022.  He was hospitalized at that time.  Discharged on ciprofloxacin and Augmentin    Postoperatively he has developed wound dehiscence at the medial aspect of the incision.  Wound cares including daily cleansing, Betadine application followed by a gauze dressing.  He wears a surgical shoe for driving and a forefoot offloading shoe for walking    He believes things are improving today.     POSTOPERATIVE DIAGNOSES:     1.  Diabetes mellitus with peripheral neuropathy.  2.  Previous partial right great toe amputation.  2.  Osteomyelitis, right great toe proximal phalanx.     PROCEDURES:    1.  Right great toe amputation with disarticulation at the first metatarsophalangeal joint.  2.  A 5 x 3 cm flap closure of the wound.*  Past Medical History:   Diagnosis  Date     Cerebral infarction (H)     2010     Chronic infection      Hyperlipidemia LDL goal <70      Hypertension      Numbness and tingling      Obesity      GILA (obstructive sleep apnea) 10/22/2018    CPAP intolerant     PVD (peripheral vascular disease) (H)     s/p left partial toe amputation     Renal stone      Tremor      Type 2 diabetes mellitus with diabetic polyneuropathy, with long-term current use of insulin (H)    *  *  Past Surgical History:   Procedure Laterality Date     AMPUTATE FOOT Left 8/18/2016    Procedure: AMPUTATE FOOT;  Surgeon: Jack Younger DPM;  Location: [facility] OR     AMPUTATE TOE(S) Right 2/1/2016    Procedure: AMPUTATE TOE(S);  Surgeon: Rachelle Manriquez DPM, Pod;  Location: [facility] OR     AMPUTATE TOE(S) Right 8/2/2019    Procedure: Right fourth toe partial amputation for treatment of osteomyelitis.;  Surgeon: Jack Younger DPM;  Location: [facility] OR     AMPUTATE TOE(S) Left 11/8/2019    Procedure: Left second toe amputation at the metatarsophalangeal joint.;  Surgeon: Rachelle Manriquez DPM, Podiatry/Foot and Ankle Surgery;  Location: [facility] OR     AMPUTATE TOE(S) Right 6/5/2022    Procedure: AMPUTATION OF RIGHT GREAT TOE;  Surgeon: Wili Quiles DPM;  Location: [facility] OR     ANGIOGRAM       COLONOSCOPY       COMBINED CYSTOSCOPY, RETROGRADES, URETEROSCOPY, INSERT STENT Left 8/21/2016    Procedure: COMBINED CYSTOSCOPY, RETROGRADES, URETEROSCOPY, INSERT STENT;  Surgeon: Artemio Valenzuela MD;  Location: [facility] OR     COMBINED CYSTOSCOPY, RETROGRADES, URETEROSCOPY, LASER HOLMIUM LITHOTRIPSY URETER(S), INSERT STENT Left 10/3/2016    Procedure: COMBINED CYSTOSCOPY, RETROGRADES, URETEROSCOPY, LASER HOLMIUM LITHOTRIPSY URETER(S), INSERT STENT;  Surgeon: Artemio Valenzuela MD;  Location: [facility] OR     EXTRACORPOREAL SHOCK WAVE LITHOTRIPSY (ESWL) Left 9/8/2016    Procedure: EXTRACORPOREAL SHOCK WAVE LITHOTRIPSY (ESWL);  Surgeon: Artemio Valenzuela MD;  Location:  OR     RECESSION  "GASTROCNEMIUS Right 2/1/2016    Procedure: RECESSION GASTROCNEMIUS;  Surgeon: Rachelle Manriquez DPM, Pod;  Location: [facility] OR   *  *      EXAM:    Vitals: /70   Ht 1.803 m (5' 11\")   Wt 112 kg (247 lb)   BMI 34.45 kg/m    BMI: Body mass index is 34.45 kg/m .       Derm: Three quarters of this incision appears to have healed more so laterally.  There is an area of wound dehiscence along the medial aspect of the incision.  This probes up to 5 mm deep.  I did not contact bone.  No malodor.  No purulence.  No erythema.  The base appeared viable/vascular.    Please see picture below.     X-Ray Findings:  I personally reviewed the right foot images.  I do not appreciate any cortical irregularities.  There is radiolucency of the first metatarsal head deep to the area of dehiscence.          "

## 2022-06-29 NOTE — LETTER
6/29/2022         RE: Crispin Rojas  [address] [facility] [address]        Dear Colleague,    Thank you for referring your patient, Crispin Rojas, to the [facility]. Please see a copy of my visit note below.    ASSESSMENT:  Encounter Diagnoses   Name Primary?     Wound dehiscence Yes     Surgery follow-up examination      Type 2 diabetes mellitus with peripheral neuropathy (H)      MEDICAL DECISION MAKING:  The wound appears larger in surface area.  The base is more vascular.  No clinical signs of infection.    Continue current wound cares: Micro cleanse, Betadine, gauze dressing  Continue offloading: Surgical shoe for driving, cam walker for ambulation  Await radiology report  I am somewhat concerned about a radiolucency seen deep to the area of dehiscence.  An MRI for evaluation of first metatarsal head osteomyelitis might be needed.  Follow-up in 1 to 2 weeks.    Disclaimer: This note consists of symbols derived from keyboarding, dictation and/or voice recognition software. As a result, there may be errors in the script that have gone undetected. Please consider this when interpreting information found in this chart.    Valentino Molina DPM, FACFAS, MS    [place] Department of Podiatry/Foot & Ankle Surgery      ____________________________________________________________________    HPI:       Pat follows up status post amputation of the residual right great toe, 6/5/2022.  He was hospitalized at that time.  Discharged on ciprofloxacin and Augmentin    Postoperatively he has developed wound dehiscence at the medial aspect of the incision.  Wound cares including daily cleansing, Betadine application followed by a gauze dressing.  He wears a surgical shoe for driving and a forefoot offloading shoe for walking    He believes things are improving today.     POSTOPERATIVE DIAGNOSES:     1.  Diabetes mellitus with peripheral neuropathy.  2.  Previous partial right great toe  amputation.  2.  Osteomyelitis, right great toe proximal phalanx.     PROCEDURES:    1.  Right great toe amputation with disarticulation at the first metatarsophalangeal joint.  2.  A 5 x 3 cm flap closure of the wound.*  Past Medical History:   Diagnosis Date     Cerebral infarction (H)     2010     Chronic infection      Hyperlipidemia LDL goal <70      Hypertension      Numbness and tingling      Obesity      GILA (obstructive sleep apnea) 10/22/2018    CPAP intolerant     PVD (peripheral vascular disease) (H)     s/p left partial toe amputation     Renal stone      Tremor      Type 2 diabetes mellitus with diabetic polyneuropathy, with long-term current use of insulin (H)    *  *  Past Surgical History:   Procedure Laterality Date     AMPUTATE FOOT Left 8/18/2016    Procedure: AMPUTATE FOOT;  Surgeon: Jakc Younger DPM;  [facility]     AMPUTATE TOE(S) Right 2/1/2016    Procedure: AMPUTATE TOE(S);  Surgeon: Rachelle Manriquez DPM, Pod;  [facility]: [facility]     AMPUTATE TOE(S) Right 8/2/2019    Procedure: Right fourth toe partial amputation for treatment of osteomyelitis.;  Surgeon: Jack Younger DPM;  [facility]     AMPUTATE TOE(S) Left 11/8/2019    Procedure: Left second toe amputation at the metatarsophalangeal joint.;  Surgeon: Rachelle Manriquez DPM, Podiatry/Foot and Ankle Surgery;  Location: [facility]     AMPUTATE TOE(S) Right 6/5/2022    Procedure: AMPUTATION OF RIGHT GREAT TOE;  Surgeon: Wili Quiles DPM;  Location: [facility]     ANGIOGRAM       COLONOSCOPY       COMBINED CYSTOSCOPY, RETROGRADES, URETEROSCOPY, INSERT STENT Left 8/21/2016    Procedure: COMBINED CYSTOSCOPY, RETROGRADES, URETEROSCOPY, INSERT STENT;  Surgeon: Artemio Valenzuela MD;  Location: [facility]     COMBINED CYSTOSCOPY, RETROGRADES, URETEROSCOPY, LASER HOLMIUM LITHOTRIPSY URETER(S), INSERT STENT Left 10/3/2016    Procedure: COMBINED CYSTOSCOPY, RETROGRADES, URETEROSCOPY, LASER HOLMIUM LITHOTRIPSY URETER(S), INSERT STENT;   "Surgeon: Artemio Valenzuela MD;  [facility]     EXTRACORPOREAL SHOCK WAVE LITHOTRIPSY (ESWL) Left 9/8/2016    Procedure: EXTRACORPOREAL SHOCK WAVE LITHOTRIPSY (ESWL);  Surgeon: Artemio Valenzuela MD;  Location: [facility]     RECESSION GASTROCNEMIUS Right 2/1/2016    Procedure: RECESSION GASTROCNEMIUS;  Surgeon: Rachelle Manriquez DPM, Pod;  Location: [facility]   *  *      EXAM:    Vitals: /70   Ht 1.803 m (5' 11\")   Wt 112 kg (247 lb)   BMI 34.45 kg/m    BMI: Body mass index is 34.45 kg/m .       Derm: Three quarters of this incision appears to have healed more so laterally.  There is an area of wound dehiscence along the medial aspect of the incision.  This probes up to 5 mm deep.  I did not contact bone.  No malodor.  No purulence.  No erythema.  The base appeared viable/vascular.    Please see picture below.     X-Ray Findings:  I personally reviewed the right foot images.  I do not appreciate any cortical irregularities.  There is radiolucency of the first metatarsal head deep to the area of dehiscence.              Again, thank you for allowing me to participate in the care of your patient.        Sincerely,        Valentino Molina DPM    "

## 2022-07-06 ENCOUNTER — OFFICE VISIT (OUTPATIENT)
Dept: FAMILY MEDICINE | Facility: CLINIC | Age: 62
End: 2022-07-06
Payer: COMMERCIAL

## 2022-07-06 ENCOUNTER — OFFICE VISIT (OUTPATIENT)
Dept: PODIATRY | Facility: CLINIC | Age: 62
End: 2022-07-06
Payer: COMMERCIAL

## 2022-07-06 VITALS
RESPIRATION RATE: 14 BRPM | OXYGEN SATURATION: 98 % | DIASTOLIC BLOOD PRESSURE: 68 MMHG | SYSTOLIC BLOOD PRESSURE: 138 MMHG | WEIGHT: 246 LBS | HEART RATE: 70 BPM | TEMPERATURE: 98.3 F | BODY MASS INDEX: 34.31 KG/M2

## 2022-07-06 VITALS
SYSTOLIC BLOOD PRESSURE: 136 MMHG | DIASTOLIC BLOOD PRESSURE: 80 MMHG | WEIGHT: 247 LBS | HEIGHT: 71 IN | BODY MASS INDEX: 34.58 KG/M2

## 2022-07-06 DIAGNOSIS — I10 BENIGN ESSENTIAL HYPERTENSION: Primary | ICD-10-CM

## 2022-07-06 DIAGNOSIS — N18.30 DIABETES MELLITUS DUE TO UNDERLYING CONDITION WITH STAGE 3 CHRONIC KIDNEY DISEASE, UNSPECIFIED WHETHER LONG TERM INSULIN USE, UNSPECIFIED WHETHER STAGE 3A OR 3B CKD (H): ICD-10-CM

## 2022-07-06 DIAGNOSIS — E11.42 TYPE 2 DIABETES MELLITUS WITH PERIPHERAL NEUROPATHY (H): ICD-10-CM

## 2022-07-06 DIAGNOSIS — Z23 ENCOUNTER FOR VACCINATION: ICD-10-CM

## 2022-07-06 DIAGNOSIS — M86.9 OSTEOMYELITIS OF GREAT TOE OF RIGHT FOOT (H): ICD-10-CM

## 2022-07-06 DIAGNOSIS — E08.22 DIABETES MELLITUS DUE TO UNDERLYING CONDITION WITH STAGE 3 CHRONIC KIDNEY DISEASE, UNSPECIFIED WHETHER LONG TERM INSULIN USE, UNSPECIFIED WHETHER STAGE 3A OR 3B CKD (H): ICD-10-CM

## 2022-07-06 DIAGNOSIS — T81.30XA WOUND DEHISCENCE: ICD-10-CM

## 2022-07-06 DIAGNOSIS — L97.521 ULCER OF LEFT FOOT, LIMITED TO BREAKDOWN OF SKIN (H): Primary | ICD-10-CM

## 2022-07-06 DIAGNOSIS — Z20.822 EXPOSURE TO 2019 NOVEL CORONAVIRUS: ICD-10-CM

## 2022-07-06 DIAGNOSIS — I82.441 ACUTE DEEP VEIN THROMBOSIS (DVT) OF TIBIAL VEIN OF RIGHT LOWER EXTREMITY (H): ICD-10-CM

## 2022-07-06 DIAGNOSIS — R53.83 FATIGUE, UNSPECIFIED TYPE: ICD-10-CM

## 2022-07-06 DIAGNOSIS — L97.512 ULCER OF RIGHT FOOT WITH FAT LAYER EXPOSED (H): ICD-10-CM

## 2022-07-06 DIAGNOSIS — Z09 SURGERY FOLLOW-UP EXAMINATION: ICD-10-CM

## 2022-07-06 PROCEDURE — U0003 INFECTIOUS AGENT DETECTION BY NUCLEIC ACID (DNA OR RNA); SEVERE ACUTE RESPIRATORY SYNDROME CORONAVIRUS 2 (SARS-COV-2) (CORONAVIRUS DISEASE [COVID-19]), AMPLIFIED PROBE TECHNIQUE, MAKING USE OF HIGH THROUGHPUT TECHNOLOGIES AS DESCRIBED BY CMS-2020-01-R: HCPCS | Performed by: FAMILY MEDICINE

## 2022-07-06 PROCEDURE — 99214 OFFICE O/P EST MOD 30 MIN: CPT | Mod: 25 | Performed by: FAMILY MEDICINE

## 2022-07-06 PROCEDURE — 90471 IMMUNIZATION ADMIN: CPT | Performed by: FAMILY MEDICINE

## 2022-07-06 PROCEDURE — 99024 POSTOP FOLLOW-UP VISIT: CPT | Performed by: PODIATRIST

## 2022-07-06 PROCEDURE — 99213 OFFICE O/P EST LOW 20 MIN: CPT | Mod: 24 | Performed by: PODIATRIST

## 2022-07-06 PROCEDURE — 90677 PCV20 VACCINE IM: CPT | Performed by: FAMILY MEDICINE

## 2022-07-06 PROCEDURE — 11042 DBRDMT SUBQ TIS 1ST 20SQCM/<: CPT | Mod: 58 | Performed by: PODIATRIST

## 2022-07-06 PROCEDURE — U0005 INFEC AGEN DETEC AMPLI PROBE: HCPCS | Performed by: FAMILY MEDICINE

## 2022-07-06 RX ORDER — APIXABAN 5 MG/1
5 TABLET, FILM COATED ORAL 2 TIMES DAILY
Qty: 30 TABLET | Refills: 1 | Status: SHIPPED | OUTPATIENT
Start: 2022-07-06 | End: 2022-08-02

## 2022-07-06 RX ORDER — AMLODIPINE BESYLATE 5 MG/1
5 TABLET ORAL 2 TIMES DAILY
Qty: 180 TABLET | Refills: 5 | OUTPATIENT
Start: 2022-07-06

## 2022-07-06 RX ORDER — AMLODIPINE BESYLATE 5 MG/1
5 TABLET ORAL 2 TIMES DAILY
Qty: 180 TABLET | Refills: 3 | Status: SHIPPED | OUTPATIENT
Start: 2022-07-06 | End: 2023-01-11

## 2022-07-06 RX ORDER — SEMAGLUTIDE 1.34 MG/ML
INJECTION, SOLUTION SUBCUTANEOUS
COMMUNITY
Start: 2022-07-01 | End: 2022-10-24

## 2022-07-06 NOTE — PROGRESS NOTES
"ASSESSMENT:  Encounter Diagnoses   Name Primary?     Wound dehiscence      Ulcer of right foot with fat layer exposed (H)      Surgery follow-up examination      Ulcer of left foot, limited to breakdown of skin (H) Yes     Type 2 diabetes mellitus with peripheral neuropathy (H)      MEDICAL DECISION MAKING:  Both wounds are stable.  No clinical signs of infection.  The area of dehiscence appears to be filling in with more granulation.    Right foot:  Continue current wound cares: Micro cleanse, Betadine, gauze dressing  Continue offloading: Surgical shoe for driving, cam walker for ambulation    Left foot:  Daily cleansing, blot dry, topical antibiotic and light dressing    Follow-up in 2 to 3 weeks    Excisional Debridement    The excisional debridement procedure was discussed.  This included the goals of removing non-viable tissue, evaluating the full extent of wound, and promoting wound healing.  Crispin Rojas  provided verba consent.  The \"Time Out\" was called, confirming procedure and location.     Using a sterile #15 blade , excisional debridment of the right foot ulcer was performed. The hyperkeratotic eschar surrounding the wound was removed, by excising skin edges back to healthy, bleeding tissue. Non-viable tissue was excised.  Debridement was carried out to the depth of the fat layer. The ulcer base was scraped to remove bioburden and promote healing.  The area debrided was less than 20 square cm.   There was moderate bleeding with the procedure. No anesthesia was needed due to peripheral neuropathy.   A sterile dressing was applied.      Disclaimer: This note consists of symbols derived from keyboarding, dictation and/or voice recognition software. As a result, there may be errors in the script that have gone undetected. Please consider this when interpreting information found in this chart.    Valentino Molina DPM, FACFAS, MS    [place] Department of Podiatry/Foot & Ankle " Surgery      ____________________________________________________________________    HPI:      Pat follows up status post amputation of the residual right great toe, 6/5/2022.  He was hospitalized at that time.  Discharged on ciprofloxacin and Augmentin     Postoperatively he has developed wound dehiscence at the medial aspect of the incision.  Wound cares including daily cleansing, Betadine application followed by a gauze dressing.  He wears a surgical shoe for driving and a forefoot offloading shoe for walking  He works at a golf course  Pat was concerned about the odor of the drainage last night.      He believes things are improving today.     POSTOPERATIVE DIAGNOSES:     1.  Diabetes mellitus with peripheral neuropathy.  2.  Previous partial right great toe amputation.  2.  Osteomyelitis, right great toe proximal phalanx.     PROCEDURES:    1.  Right great toe amputation with disarticulation at the first metatarsophalangeal joint.  2.  A 5 x 3 cm flap closure of the wound.*    New Problem: That report peeling off some dry skin for the plantar aspect of his left foot dealing with fairly significant bleeding last night.     Past Medical History:   Diagnosis Date     Cerebral infarction (H)     2010     Chronic infection      Hyperlipidemia LDL goal <70      Hypertension      Numbness and tingling      Obesity      GILA (obstructive sleep apnea) 10/22/2018    CPAP intolerant     PVD (peripheral vascular disease) (H)     s/p left partial toe amputation     Renal stone      Tremor      Type 2 diabetes mellitus with diabetic polyneuropathy, with long-term current use of insulin (H)    *  *  Past Surgical History:   Procedure Laterality Date     AMPUTATE FOOT Left 8/18/2016    Procedure: AMPUTATE FOOT;  Surgeon: Jack Younger DPM;  [facility]     AMPUTATE TOE(S) Right 2/1/2016    Procedure: AMPUTATE TOE(S);  Surgeon: Rachelle Manriquez DPM, Pod;  [facility]     AMPUTATE TOE(S) Right 8/2/2019    Procedure:  "Right fourth toe partial amputation for treatment of osteomyelitis.;  Surgeon: Jack Younger DPM;  [facility]     AMPUTATE TOE(S) Left 11/8/2019    Procedure: Left second toe amputation at the metatarsophalangeal joint.;  Surgeon: Rachelle Manriquez DPM, Podiatry/Foot and Ankle Surgery;  [facility]     AMPUTATE TOE(S) Right 6/5/2022    Procedure: AMPUTATION OF RIGHT GREAT TOE;  Surgeon: Wili Quiles DPM;  Location: [facility]     ANGIOGRAM       COLONOSCOPY       COMBINED CYSTOSCOPY, RETROGRADES, URETEROSCOPY, INSERT STENT Left 8/21/2016    Procedure: COMBINED CYSTOSCOPY, RETROGRADES, URETEROSCOPY, INSERT STENT;  Surgeon: Artemio Valenzuela MD;  [facility]     COMBINED CYSTOSCOPY, RETROGRADES, URETEROSCOPY, LASER HOLMIUM LITHOTRIPSY URETER(S), INSERT STENT Left 10/3/2016    Procedure: COMBINED CYSTOSCOPY, RETROGRADES, URETEROSCOPY, LASER HOLMIUM LITHOTRIPSY URETER(S), INSERT STENT;  Surgeon: Artemio Valenzuela MD;  [facility]     EXTRACORPOREAL SHOCK WAVE LITHOTRIPSY (ESWL) Left 9/8/2016    Procedure: EXTRACORPOREAL SHOCK WAVE LITHOTRIPSY (ESWL);  Surgeon: Artemio Valenzuela MD;  [facility]     RECESSION GASTROCNEMIUS Right 2/1/2016    Procedure: RECESSION GASTROCNEMIUS;  Surgeon: Rachelle Manriquez DPM, Pod;  Location: [facility]   *      EXAM:    Vitals: /80   Ht 1.803 m (5' 11\")   Wt 112 kg (247 lb)   BMI 34.45 kg/m    BMI: Body mass index is 34.45 kg/m .    Derm: Three quarters of this incision appears to have healed more so laterally.  There is an area of wound dehiscence along the medial aspect of the incision.  2cm x 1.5 cm. This probes up to 5 mm deep.  I did not contact bone.  No malodor.  No purulence.  No erythema.  The base appeared viable/vascular.     Please see picture below of right foot below.    There is a new ulceration or injury of the plantar aspect of the left first metatarsal head region.  This is consistent with an avulsion of skin, an ulcer to " deeper skin.  This measures 1.5 cm x 0.5 cm.  No erythema, edema, malodor.     FOOT THREE VIEWS RIGHT  6/29/2022 2:32 PM      HISTORY: Wound dehiscence  COMPARISON: 6/5/2022                                                                  IMPRESSION: Status post amputation of the great toe at the level of  the MTP joint. Surrounding soft tissue swelling with possible  ulceration. No definite evidence of acute osteomyelitis. Partial  amputation of the third toe at the level of the PIP joint. No acute  fracture or malalignment.     MORRO HERNÁNDEZ MD

## 2022-07-06 NOTE — LETTER
"    7/6/2022         RE: Crispin Rojas  [address] [facility] [address]        Dear Colleague,    Thank you for referring your patient, Crispin Rojas, to the [facility]. Please see a copy of my visit note below.    ASSESSMENT:  Encounter Diagnoses   Name Primary?     Wound dehiscence      Ulcer of right foot with fat layer exposed (H)      Surgery follow-up examination      Ulcer of left foot, limited to breakdown of skin (H) Yes     Type 2 diabetes mellitus with peripheral neuropathy (H)      MEDICAL DECISION MAKING:  Both wounds are stable.  No clinical signs of infection.  The area of dehiscence appears to be filling in with more granulation.    Right foot:  Continue current wound cares: Micro cleanse, Betadine, gauze dressing  Continue offloading: Surgical shoe for driving, cam walker for ambulation    Left foot:  Daily cleansing, blot dry, topical antibiotic and light dressing    Follow-up in 2 to 3 weeks    Excisional Debridement    The excisional debridement procedure was discussed.  This included the goals of removing non-viable tissue, evaluating the full extent of wound, and promoting wound healing.  Crispin Rojas  provided verba consent.  The \"Time Out\" was called, confirming procedure and location.     Using a sterile #15 blade , excisional debridment of the right foot ulcer was performed. The hyperkeratotic eschar surrounding the wound was removed, by excising skin edges back to healthy, bleeding tissue. Non-viable tissue was excised.  Debridement was carried out to the depth of the fat layer. The ulcer base was scraped to remove bioburden and promote healing.  The area debrided was less than 20 square cm.   There was moderate bleeding with the procedure. No anesthesia was needed due to peripheral neuropathy.   A sterile dressing was applied.      Disclaimer: This note consists of symbols derived from keyboarding, dictation and/or voice recognition software. " As a result, there may be errors in the script that have gone undetected. Please consider this when interpreting information found in this chart.    Valentino Molina DPM, FACGAEL, MS    [place] Department of Podiatry/Foot & Ankle Surgery      ____________________________________________________________________    HPI:      Pat follows up status post amputation of the residual right great toe, 6/5/2022.  He was hospitalized at that time.  Discharged on ciprofloxacin and Augmentin     Postoperatively he has developed wound dehiscence at the medial aspect of the incision.  Wound cares including daily cleansing, Betadine application followed by a gauze dressing.  He wears a surgical shoe for driving and a forefoot offloading shoe for walking  He works at a golf course  Pat was concerned about the odor of the drainage last night.      He believes things are improving today.     POSTOPERATIVE DIAGNOSES:     1.  Diabetes mellitus with peripheral neuropathy.  2.  Previous partial right great toe amputation.  2.  Osteomyelitis, right great toe proximal phalanx.     PROCEDURES:    1.  Right great toe amputation with disarticulation at the first metatarsophalangeal joint.  2.  A 5 x 3 cm flap closure of the wound.*    New Problem: That report peeling off some dry skin for the plantar aspect of his left foot dealing with fairly significant bleeding last night.     Past Medical History:   Diagnosis Date     Cerebral infarction (H)     2010     Chronic infection      Hyperlipidemia LDL goal <70      Hypertension      Numbness and tingling      Obesity      GILA (obstructive sleep apnea) 10/22/2018    CPAP intolerant     PVD (peripheral vascular disease) (H)     s/p left partial toe amputation     Renal stone      Tremor      Type 2 diabetes mellitus with diabetic polyneuropathy, with long-term current use of insulin (H)    *  *  Past Surgical History:   Procedure Laterality Date     AMPUTATE FOOT Left 8/18/2016    Procedure: AMPUTATE  "FOOT;  Surgeon: Jack Younger DPM;  [facility]     AMPUTATE TOE(S) Right 2/1/2016    Procedure: AMPUTATE TOE(S);  Surgeon: Rachelle Manriquez DPM, Pod;  [facility]: [facility]     AMPUTATE TOE(S) Right 8/2/2019    Procedure: Right fourth toe partial amputation for treatment of osteomyelitis.;  Surgeon: Jack Younger DPM;  [facility]     AMPUTATE TOE(S) Left 11/8/2019    Procedure: Left second toe amputation at the metatarsophalangeal joint.;  Surgeon: Rachelle Manriquez DPM, Podiatry/Foot and Ankle Surgery;  [facility]: [facility]     AMPUTATE TOE(S) Right 6/5/2022    Procedure: AMPUTATION OF RIGHT GREAT TOE;  Surgeon: Wili Quiles DPM;  [facility]: [facility]     ANGIOGRAM       COLONOSCOPY       COMBINED CYSTOSCOPY, RETROGRADES, URETEROSCOPY, INSERT STENT Left 8/21/2016    Procedure: COMBINED CYSTOSCOPY, RETROGRADES, URETEROSCOPY, INSERT STENT;  Surgeon: Artemio Valenzuela MD;  [facility]     COMBINED CYSTOSCOPY, RETROGRADES, URETEROSCOPY, LASER HOLMIUM LITHOTRIPSY URETER(S), INSERT STENT Left 10/3/2016    Procedure: COMBINED CYSTOSCOPY, RETROGRADES, URETEROSCOPY, LASER HOLMIUM LITHOTRIPSY URETER(S), INSERT STENT;  Surgeon: Artemio Valenzuela MD;  [facility]     EXTRACORPOREAL SHOCK WAVE LITHOTRIPSY (ESWL) Left 9/8/2016    Procedure: EXTRACORPOREAL SHOCK WAVE LITHOTRIPSY (ESWL);  Surgeon: Artemio Valenzuela MD;  [facility]     RECESSION GASTROCNEMIUS Right 2/1/2016    Procedure: RECESSION GASTROCNEMIUS;  Surgeon: Rachelle Manriquez DPM, Pod;  [facility]: [facility]   *      EXAM:    Vitals: /80   Ht 1.803 m (5' 11\")   Wt 112 kg (247 lb)   BMI 34.45 kg/m    BMI: Body mass index is 34.45 kg/m .    Derm: Three quarters of this incision appears to have healed more so laterally.  There is an area of wound dehiscence along the medial aspect of the incision.  2cm x 1.5 cm. This probes up to 5 mm deep.  I did not contact bone.  No malodor.  No purulence.  No erythema.  The base appeared " viable/vascular.     Please see picture below of right foot below.    There is a new ulceration or injury of the plantar aspect of the left first metatarsal head region.  This is consistent with an avulsion of skin, an ulcer to deeper skin.  This measures 1.5 cm x 0.5 cm.  No erythema, edema, malodor.     FOOT THREE VIEWS RIGHT  6/29/2022 2:32 PM      HISTORY: Wound dehiscence  COMPARISON: 6/5/2022                                                                  IMPRESSION: Status post amputation of the great toe at the level of  the MTP joint. Surrounding soft tissue swelling with possible  ulceration. No definite evidence of acute osteomyelitis. Partial  amputation of the third toe at the level of the PIP joint. No acute  fracture or malalignment.     MORRO HERNÁNDEZ MD             Again, thank you for allowing me to participate in the care of your patient.        Sincerely,        Valentino Molina DPM

## 2022-07-06 NOTE — PROGRESS NOTES
"  Assessment & Plan   See after visit summary and result note from studies for helpful information and advice given to patient.    Benign essential hypertension    - amLODIPine (NORVASC) 5 MG tablet  Dispense: 180 tablet; Refill: 3    Acute deep vein thrombosis (DVT) of tibial vein of right lower extremity (H)    - ELIQUIS ANTICOAGULANT 5 MG tablet  Dispense: 30 tablet; Refill: 1    Encounter for vaccination    - Pneumococcal 20 Valent Conjugate (Prevnar 20)    Exposure to 2019 novel coronavirus    - Symptomatic; Yes; 7/6/2022 COVID-19 Virus (Coronavirus) by PCR Nose    Fatigue, unspecified type    - Symptomatic; Yes; 7/6/2022 COVID-19 Virus (Coronavirus) by PCR Nose    Osteomyelitis of great toe of right foot (H)      Diabetes mellitus due to underlying condition with stage 3 chronic kidney disease, unspecified whether long term insulin use, unspecified whether stage 3a or 3b CKD (H)             BMI:   Estimated body mass index is 34.31 kg/m  as calculated from the following:    Height as of an earlier encounter on 7/6/22: 1.803 m (5' 11\").    Weight as of this encounter: 111.6 kg (246 lb).           Return for Follow up.    Johann Serna DO  [facility]    Tatyana Dimas is a 62 year old, presenting for the following health issues:  Recheck Medication      History of Present Illness       Reason for visit:  Physical    He eats 2-3 servings of fruits and vegetables daily.He consumes 0 sweetened beverage(s) daily.He exercises with enough effort to increase his heart rate 9 or less minutes per day.  He exercises with enough effort to increase his heart rate 3 or less days per week.   He is taking medications regularly.    Last Px: 1/3/22   Post op follow up from blood clots and right toe amputation.           Review of Systems   Patient is seen for follow-up management of blood pressure, and review of some other concerns.    Has been out of amlodipine for 3 days.  Considering his current " mildly elevated blood pressure is when not taking amlodipine, I recommended restarting the amlodipine and if still elevated, we can then consider changing medication management.    He feels more tired the past couple days, which may be related to recent 4th of July noise in neighborhood causing poor sleep, but coworker has been ill with COVID-19 making him cautious, and he would like a COVID-19 test today.     No recent fever, chills, or muscle aches.     Patient had bilateral foot exam done by podiatrist at follow-up appointment today, so diabetic foot exam was deferred with dressings left in place.      Objective    /68   Pulse 70   Temp 98.3  F (36.8  C) (Oral)   Resp 14   Wt 111.6 kg (246 lb)   SpO2 98%   BMI 34.31 kg/m    Body mass index is 34.31 kg/m .   JLW    Physical Exam   Vital signs reviewed.  Patient is in no acute appearing distress.  Breathing appears nonlabored.  Patient is alert and oriented ×3.  Patient is very pleasant, making good eye contact and responding with clear fluent speech.    Heart: Heart rate is regular without murmur.    Lungs: Lungs are clear to auscultation with good airflow bilaterally.    Skin/extremities: Warm and dry, with slight, less than 2 mm bilateral lower leg edema.  This is improved from previous exams by me.              Prior to immunization administration, verified patients identity using patient s name and date of birth. Please see Immunization Activity for additional information.     Screening Questionnaire for Adult Immunization    Are you sick today?   No   Do you have allergies to medications, food, a vaccine component or latex?   No   Have you ever had a serious reaction after receiving a vaccination?   No   Do you have a long-term health problem with heart, lung, kidney, or metabolic disease (e.g., diabetes), asthma, a blood disorder, no spleen, complement component deficiency, a cochlear implant, or a spinal fluid leak?  Are you on long-term aspirin  therapy?   No   Do you have cancer, leukemia, HIV/AIDS, or any other immune system problem?   No   Do you have a parent, brother, or sister with an immune system problem?   Yes   In the past 3 months, have you taken medications that affect  your immune system, such as prednisone, other steroids, or anticancer drugs; drugs for the treatment of rheumatoid arthritis, Crohn s disease, or psoriasis; or have you had radiation treatments?   No   Have you had a seizure, or a brain or other nervous system problem?   No   During the past year, have you received a transfusion of blood or blood    products, or been given immune (gamma) globulin or antiviral drug?   No   For women: Are you pregnant or is there a chance you could become       pregnant during the next month?   No   Have you received any vaccinations in the past 4 weeks?   No     Immunization questionnaire was positive for at least one answer.  Notified Dr. Serna.        Per orders of Dr. Serna, injection of Prevnar given by Maylin Pino MA. Patient instructed to remain in clinic for 15 minutes afterwards, and to report any adverse reaction to me immediately.       Screening performed by Maylin Pino MA on 7/6/2022 at 6:01 PM.      .  ..

## 2022-07-06 NOTE — TELEPHONE ENCOUNTER
Patient has refills on file from previous fill at [facility].    Natacha Cano RN

## 2022-07-07 ENCOUNTER — VIRTUAL VISIT (OUTPATIENT)
Dept: ENDOCRINOLOGY | Facility: CLINIC | Age: 62
End: 2022-07-07
Payer: COMMERCIAL

## 2022-07-07 DIAGNOSIS — E11.42 TYPE 2 DIABETES MELLITUS WITH PERIPHERAL NEUROPATHY (H): Primary | ICD-10-CM

## 2022-07-07 LAB — SARS-COV-2 RNA RESP QL NAA+PROBE: NEGATIVE

## 2022-07-07 PROCEDURE — 99213 OFFICE O/P EST LOW 20 MIN: CPT | Mod: 95 | Performed by: NURSE PRACTITIONER

## 2022-07-07 RX ORDER — FLASH GLUCOSE SENSOR
KIT MISCELLANEOUS
Qty: 6 EACH | Refills: 3 | Status: SHIPPED | OUTPATIENT
Start: 2022-07-07 | End: 2023-01-26

## 2022-07-07 RX ORDER — METFORMIN HCL 500 MG
TABLET, EXTENDED RELEASE 24 HR ORAL
Qty: 360 TABLET | Refills: 1 | Status: SHIPPED | OUTPATIENT
Start: 2022-07-07 | End: 2023-01-09

## 2022-07-07 NOTE — PROGRESS NOTES
"Judie is a 62 year old who is being evaluated via a billable video visit.      How would you like to obtain your AVS? MyChart  If the video visit is dropped, the invitation should be resent by: Text to cell phone: [phone]  Will anyone else be joining your video visit? [facility] ENDOCRINOLOGY    Diabetes Note 7/8/2022    Crispin Rojas, 1960, [id number]          Reason for visit      1. Type 2 diabetes mellitus with peripheral neuropathy (H)        HPI     Crispin Rojas is a very pleasant 62 year old old male who presents for follow up.  SUMMARY:    Judie is contacted today in follow-up for DM 2 with neuropathy. He was just in Hospital for a DVT and a toe amputation. He had a wound that got away from him and  to the loss of his R great toe. He reports that two weeks prior to his Hospitalization, his BG were running high. He states that he \"didn't think about the infection in his foot possibly driving the higher numbers\". He has been without his Corwin sensors for about a week. He has been taking less insulin to try and keep from hypoglycemia. The Corwin download for the previous two weeks shows a significant amount of hypoglycemia. He usually uses a CHO ratio of 3:10, and is taking Tresiba 55 units daily. He also takes Metformin, 1000 mg BID, as well as Ozempic, 1 mg weekly.      Blood glucose data:      Past Medical History     Patient Active Problem List   Diagnosis     Morbid obesity, unspecified obesity type (H)     Calculus of kidney     Chronic left shoulder pain     Type 2 diabetes mellitus with peripheral neuropathy (H)     Hyperlipidemia LDL goal <70     Essential hypertension     Right bundle branch block     GILA (obstructive sleep apnea)     Diabetic ulcer of lower extremity (H)     PVD (peripheral vascular disease) (H)     Scoliosis of thoracic spine, unspecified scoliosis type     Other sequelae following unspecified cerebrovascular disease     Cervical pain     " Bilateral thoracic back pain     Osteomyelitis of great toe of right foot (H)     Acute deep vein thrombosis (DVT) of tibial vein of right lower extremity (H)     Abnormal CT of liver     Benign neoplasm of transverse colon     Cirrhosis of liver (H)     Constipation     Diverticular disease     Duodenitis     Epigastric pain     Hemorrhoids     Nausea and vomiting     Polyp of colon        Family History       family history includes Arthritis in his mother; Cancer in his mother; Cerebrovascular Disease in his mother; Connective Tissue Disorder in his mother; Diabetes in his father, maternal grandfather, mother, and sister.    Social History      reports that he has never smoked. He has never used smokeless tobacco. He reports current alcohol use. He reports that he does not use drugs.      Review of Systems     Patient has no polyuria or polydipsia, no chest pain, dyspnea or TIA's, no numbness, tingling or pain in extremities  Remainder negative except as noted in HPI.      Vital Signs     There were no vitals taken for this visit.  Wt Readings from Last 3 Encounters:   07/06/22 111.6 kg (246 lb)   07/06/22 112 kg (247 lb)   06/29/22 112 kg (247 lb)       Physical Exam     Constitutional:  Well developed, Well nourished  HENT:  Normocephalic,   Neck: normal in appearance  Eyes:  PERRL, Conjunctiva pink  Respiratory:  No respiratory distress  Skin: No acanthosis nigricans, lipoatrophy or lipodystrophy  Neurologic:  Alert & oriented x 3, nonfocal  Psychiatric:  Affect, Mood, Insight appropriate          Assessment     1. Type 2 diabetes mellitus with peripheral neuropathy (H)        Plan       Will order sensors and Metformin for pt. Some concerns regarding hypoglycemia, and he is going to pay better attention.  F/u with me as scheduled.      Shira Montanez NP  [facility]  7/8/2022  6:42 AM      Lab Results     No results found for: HGBA1C, CREATININE, MICROALBUR    Cholesterol   Date Value Ref Range Status    06/22/2022 113 <200 mg/dL Final   06/22/2021 95 <200 mg/dL Final     HDL Cholesterol   Date Value Ref Range Status   06/22/2021 42 >39 mg/dL Final     Direct Measure HDL   Date Value Ref Range Status   06/22/2022 47 >=40 mg/dL Final     Triglycerides   Date Value Ref Range Status   06/22/2022 58 <150 mg/dL Final   06/22/2021 42 <150 mg/dL Final     Comment:     Fasting specimen             Current Medications     Outpatient Medications Prior to Visit   Medication Sig Dispense Refill     amLODIPine (NORVASC) 5 MG tablet Take 1 tablet (5 mg) by mouth 2 times daily Take one tablet twice daily  SEE MD THIS QUARTER 180 tablet 3     aspirin (ASA) 81 MG EC tablet Take 1 tablet (81 mg) by mouth daily       atorvastatin (LIPITOR) 40 MG tablet Take 1 tablet (40 mg) by mouth daily Take one tablet daily SEE PROVIDER FOR NEXT REFILL 90 tablet 1     blood glucose (NO BRAND SPECIFIED) lancets standard Use to test blood sugar 3 times daily or as directed. 300 each 3     calcium carbonate (OS-NARA) 500 MG tablet Take 1 tablet by mouth 2 times daily       Cholecalciferol (VITAMIN D3 PO) Take 1,000 Units by mouth daily        cholecalciferol 25 MCG (1000 UT) TABS Take by mouth every 24 hours       Co-Enzyme Q10 100 MG CAPS Take 100 mg by mouth daily        collagenase (SANTYL) 250 UNIT/GM external ointment Apply topically daily 30 g 1     ELIQUIS ANTICOAGULANT 5 MG tablet Take 1 tablet (5 mg) by mouth 2 times daily 30 tablet 1     furosemide (LASIX) 20 MG tablet Take 1 tablet (20 mg) by mouth daily as needed (for leg swelling) As needed for edema 30 tablet 3     gabapentin (NEURONTIN) 100 MG capsule Take 400 mg by mouth 3 times daily       hydrochlorothiazide (MICROZIDE) 12.5 MG capsule Take 1 capsule (12.5 mg) by mouth every morning 90 capsule 2     insulin aspart (NOVOLOG PEN) 100 UNIT/ML pen Use daily with meals, up to 70 units a day (Patient taking differently: Inject 15-25 Units Subcutaneous 3 times daily (with meals) Use daily  with meals, up to 70 units a day) 30 mL 11     insulin degludec (TRESIBA FLEXTOUCH) 100 UNIT/ML pen 45-55 units subcutaneous daily (Patient taking differently: Inject 55 Units Subcutaneous daily 45-55 units subcutaneous daily) 60 mL 1     insulin pen needle (B-D U/F) 31G X 5 MM miscellaneous USE TO INJECT LANTUS TWICE DAILY AND NOVOLOG THREE TIMES DAILY AS DIRECTED 500 each 0     ketoconazole (NIZORAL) 2 % external shampoo APPLY TOPICALLY DAILY AS NEEDED FOR ITCHING OR IRRITATION. SEE MD AFTER 6 WEEKS 120 mL 3     lisinopril (ZESTRIL) 40 MG tablet Take 1 tablet (40 mg) by mouth daily 100 tablet 5     MAGNESIUM GLYCINATE PLUS PO Take 400 mg by mouth 2 times daily       Multiple Vitamins-Minerals (MULTIVITAMIN PO) Take 1 tablet by mouth daily        Omega-3 Fatty Acids (OMEGA-3 FISH OIL PO) Take 1 g by mouth 2 times daily (with meals)        omeprazole (PRILOSEC) 40 MG DR capsule Take 40 mg by mouth daily       OZEMPIC, 1 MG/DOSE, 4 MG/3ML SOPN INJECT 1 MG SUBCUTANEOUSLY AS DIRECTED EVERY 7 DAYS ON WEDNESDAYS       potassium chloride ER (KLOR-CON M) 10 MEQ CR tablet Take 1 tablet (10 mEq) by mouth 2 times daily (Patient taking differently: Take 10 mEq by mouth 2 times daily as needed (takes when taking diuretic)) 180 tablet 1     primidone (MYSOLINE) 50 MG tablet TAKE 1 TABLET BY MOUTH THREE TIMES DAILY       tadalafil (CIALIS) 5 MG tablet TAKE 1 TABLET BY MOUTH EVERY DAY AS NEEDED one hour before intercourse 30 tablet 1     Glucagon, rDNA, (GLUCAGON EMERGENCY) 1 MG KIT Inject 1 mg into the muscle once for 1 dose 1 kit 0     blood glucose monitoring (NO BRAND SPECIFIED) meter device kit Use to test blood sugar 3 times daily or as directed. 1 kit 0     blood glucose monitoring (NO BRAND SPECIFIED) test strip Use to test blood sugars 3 times daily or as directed 300 strip 3     Continuous Blood Gluc Sensor (FREESTYLE LUCERO 14 DAY SENSOR) [facility] USE ONE EVERY 14 DAYS 6 each 1     metFORMIN (GLUCOPHAGE-XR) 500 MG 24 hr  tablet TAKE 2 TABLETS BY MOUTH TWICE DAILY WITH MEALS 360 tablet 1     No facility-administered medications prior to visit.     Video-Visit Details    Video Start Time: 1520    Type of service:  Video Visit    Video End Time:1540    Originating Location (pt. Location): Other vehicle    Distant Location (provider location):  [facility]     Platform used for Video Visit: Jennifer    Date of last OV: 1/07/22  Reason for Visit: GREG Olivia:  Ran out of sensors

## 2022-07-07 NOTE — Clinical Note
"    7/7/2022         RE: Crispin Rojas  [address] [place] [facility] [address]        Dear Colleague,    Thank you for referring your patient, Crispin Rojas, to the [facility]. Please see a copy of my visit note below.    Judie is a 62 year old who is being evaluated via a billable video visit.      How would you like to obtain your AVS? MyChart  If the video visit is dropped, the invitation should be resent by: Text to cell phone: [phone]  Will anyone else be joining your video visit? No  {If patient encounters technical issues they should call [phone] :[id number]}      Video-Visit Details    Video Start Time: {video visit start/end time for provider to select:927828}    Type of service:  Video Visit    Video End Time:{video visit start/end time for provider to select:478180}    Originating Location (pt. Location): {video visit patient location:096563::\"Home\"}    Distant Location (provider location):  [facility]     Platform used for Video Visit: {Virtual Visit Platforms:459862::\"[employer]\"}    Date of last OV: 1/07/22  Reason for Visit: GREG Olivia:  Ran out of sensor                  Again, thank you for allowing me to participate in the care of your patient.        Sincerely,        Shira Montanez NP  "

## 2022-07-08 PROBLEM — K63.5 POLYP OF COLON: Status: ACTIVE | Noted: 2021-11-17

## 2022-07-08 PROBLEM — R11.2 NAUSEA AND VOMITING: Status: ACTIVE | Noted: 2022-07-08

## 2022-07-08 PROBLEM — D12.3 BENIGN NEOPLASM OF TRANSVERSE COLON: Status: ACTIVE | Noted: 2021-11-19

## 2022-07-08 PROBLEM — K59.00 CONSTIPATION: Status: ACTIVE | Noted: 2022-07-08

## 2022-07-08 PROBLEM — R93.2 ABNORMAL CT OF LIVER: Status: ACTIVE | Noted: 2022-07-08

## 2022-07-08 PROBLEM — K57.90 DIVERTICULAR DISEASE: Status: ACTIVE | Noted: 2021-11-17

## 2022-07-08 PROBLEM — R10.13 EPIGASTRIC PAIN: Status: ACTIVE | Noted: 2022-07-08

## 2022-07-08 PROBLEM — K64.9 HEMORRHOIDS: Status: ACTIVE | Noted: 2021-11-17

## 2022-07-08 PROBLEM — K29.80 DUODENITIS: Status: ACTIVE | Noted: 2021-10-15

## 2022-07-08 PROBLEM — K74.60 CIRRHOSIS OF LIVER (H): Status: ACTIVE | Noted: 2022-07-08

## 2022-07-20 ENCOUNTER — MYC MEDICAL ADVICE (OUTPATIENT)
Dept: PODIATRY | Facility: CLINIC | Age: 62
End: 2022-07-20

## 2022-07-20 ENCOUNTER — OFFICE VISIT (OUTPATIENT)
Dept: PODIATRY | Facility: CLINIC | Age: 62
End: 2022-07-20
Payer: COMMERCIAL

## 2022-07-20 VITALS
BODY MASS INDEX: 32.69 KG/M2 | WEIGHT: 233.5 LBS | SYSTOLIC BLOOD PRESSURE: 120 MMHG | DIASTOLIC BLOOD PRESSURE: 70 MMHG | HEIGHT: 71 IN

## 2022-07-20 DIAGNOSIS — T81.30XA WOUND DEHISCENCE: Primary | ICD-10-CM

## 2022-07-20 DIAGNOSIS — E11.42 TYPE 2 DIABETES MELLITUS WITH PERIPHERAL NEUROPATHY (H): ICD-10-CM

## 2022-07-20 DIAGNOSIS — L97.512 ULCER OF RIGHT FOOT WITH FAT LAYER EXPOSED (H): ICD-10-CM

## 2022-07-20 PROCEDURE — 11042 DBRDMT SUBQ TIS 1ST 20SQCM/<: CPT | Mod: 58 | Performed by: PODIATRIST

## 2022-07-20 PROCEDURE — 99024 POSTOP FOLLOW-UP VISIT: CPT | Performed by: PODIATRIST

## 2022-07-20 NOTE — LETTER
"    7/20/2022         RE: Crispin Rojas  [address] [facility] [address]        Dear Colleague,    Thank you for referring your patient, Crispin Rojas, to the [facility]. Please see a copy of my visit note below.    ASSESSMENT:  Encounter Diagnoses   Name Primary?     Wound dehiscence Yes     Ulcer of right foot with fat layer exposed (H)      Type 2 diabetes mellitus with peripheral neuropathy (H)      MEDICAL DECISION MAKING:  The wound is still fairly large in surface area yet granulating to the surface.  There are no clinical signs of infection.  X-ray of the right foot does not show any concern for acute osteomyelitis.  He is to continue with daily cleansing and dressing of the wound  Offloading: Cam walker and surgical shoe  Follow-up in 1 month    Should this wound persist, options discussed included but a referral to a wound specialist versus revision surgery and attempt to primarily close the area.    Excisional Debridement    The excisional debridement procedure was discussed.  This included the goals of removing non-viable tissue, evaluating the full extent of wound, and promoting wound healing.  Crispin Rojas  provided verba consent.  The \"Time Out\" was called, confirming procedure and location.     Using a sterile #15 blade, excisional debridment of the right foot ulcer was performed. The hyperkeratotic eschar surrounding the wound was removed, by excising skin edges back to healthy, bleeding tissue. Non-viable tissue was excised.  Debridement was carried out to the depth of the fat layer. The ulcer base was scraped to remove bioburden and promote healing.  The area debrided was less than 20 square cm.   There was moderate bleeding with the procedure. No anesthesia was needed due to peripheral neuropathy.   A sterile dressing was applied.        Disclaimer: This note consists of symbols derived from keyboarding, dictation and/or voice recognition software. As " a result, there may be errors in the script that have gone undetected. Please consider this when interpreting information found in this chart.    Valentino Molina DPM, FACFAS, MS    [place] [facility] of Podiatry/Foot & Ankle Surgery      ____________________________________________________________________    HPI:       Pat follows up status post amputation of the residual right great toe, 6/5/2022.  He was hospitalized at that time.  Discharged on ciprofloxacin and Augmentin     Postoperatively he has developed wound dehiscence at the medial aspect of the incision.  Wound cares including daily cleansing, Betadine application followed by a gauze dressing.  He wears a surgical shoe for driving and a forefoot offloading shoe for walking  He works at a golf course     POSTOPERATIVE DIAGNOSES:     1.  Diabetes mellitus with peripheral neuropathy.  2.  Previous partial right great toe amputation.  2.  Osteomyelitis, right great toe proximal phalanx.     PROCEDURES:    1.  Right great toe amputation with disarticulation at the first metatarsophalangeal joint.  2.  A 5 x 3 cm flap closure of the wound.*  *  Past Medical History:   Diagnosis Date     Cerebral infarction (H)     2010     Chronic infection      H/O blood clots 2022     Hyperlipidemia LDL goal <70      Hypertension      Numbness and tingling      Obesity      GILA (obstructive sleep apnea) 10/22/2018    CPAP intolerant     PVD (peripheral vascular disease) (H)     s/p left partial toe amputation     Renal stone      Tremor      Type 2 diabetes mellitus with diabetic polyneuropathy, with long-term current use of insulin (H)    *    EXAM:    Vitals: There were no vitals taken for this visit.  BMI: There is no height or weight on file to calculate BMI.    FOOT THREE VIEWS RIGHT  6/29/2022 2:32 PM      HISTORY: Wound dehiscence  COMPARISON: 6/5/2022                                                               IMPRESSION: Status post amputation of the great toe at  the level of  the MTP joint. Surrounding soft tissue swelling with possible  ulceration. No definite evidence of acute osteomyelitis. Partial  amputation of the third toe at the level of the PIP joint. No acute  fracture or malalignment.     MORRO HERNÁNDEZ MD        Derm: Three quarters of this incision appears to have healed more so laterally.  There is an area of wound dehiscence along the medial aspect of the incision.  This appears more superficial, compared to last time.  Now 0.2 cm in depth.  2.0cm L x 1.0cm W.  I did not contact bone.  No malodor.  No purulence.  No erythema.  The base appeared viable/vascular.     Please see picture below of right foot below.                Again, thank you for allowing me to participate in the care of your patient.        Sincerely,        Valentino Molina DPM

## 2022-07-20 NOTE — PROGRESS NOTES
"ASSESSMENT:  Encounter Diagnoses   Name Primary?     Wound dehiscence Yes     Ulcer of right foot with fat layer exposed (H)      Type 2 diabetes mellitus with peripheral neuropathy (H)      MEDICAL DECISION MAKING:  The wound is still fairly large in surface area yet granulating to the surface.  There are no clinical signs of infection.  X-ray of the right foot does not show any concern for acute osteomyelitis.  He is to continue with daily cleansing and dressing of the wound  Offloading: Cam walker and surgical shoe  Follow-up in 1 month    Should this wound persist, options discussed included but a referral to a wound specialist versus revision surgery and attempt to primarily close the area.    Excisional Debridement    The excisional debridement procedure was discussed.  This included the goals of removing non-viable tissue, evaluating the full extent of wound, and promoting wound healing.  Crispinpj Rojas  provided verba consent.  The \"Time Out\" was called, confirming procedure and location.     Using a sterile #15 blade, excisional debridment of the right foot ulcer was performed. The hyperkeratotic eschar surrounding the wound was removed, by excising skin edges back to healthy, bleeding tissue. Non-viable tissue was excised.  Debridement was carried out to the depth of the fat layer. The ulcer base was scraped to remove bioburden and promote healing.  The area debrided was less than 20 square cm.   There was moderate bleeding with the procedure. No anesthesia was needed due to peripheral neuropathy.   A sterile dressing was applied.        Disclaimer: This note consists of symbols derived from keyboarding, dictation and/or voice recognition software. As a result, there may be errors in the script that have gone undetected. Please consider this when interpreting information found in this chart.    Valentino Molina DPM, FACFAS, MS    [place] Department of Podiatry/Foot & Ankle " Surgery      ____________________________________________________________________    HPI:       Pat follows up status post amputation of the residual right great toe, 6/5/2022.  He was hospitalized at that time.  Discharged on ciprofloxacin and Augmentin     Postoperatively he has developed wound dehiscence at the medial aspect of the incision.  Wound cares including daily cleansing, Betadine application followed by a gauze dressing.  He wears a surgical shoe for driving and a forefoot offloading shoe for walking  He works at a golf course     POSTOPERATIVE DIAGNOSES:     1.  Diabetes mellitus with peripheral neuropathy.  2.  Previous partial right great toe amputation.  2.  Osteomyelitis, right great toe proximal phalanx.     PROCEDURES:    1.  Right great toe amputation with disarticulation at the first metatarsophalangeal joint.  2.  A 5 x 3 cm flap closure of the wound.*  *  Past Medical History:   Diagnosis Date     Cerebral infarction (H)     2010     Chronic infection      H/O blood clots 2022     Hyperlipidemia LDL goal <70      Hypertension      Numbness and tingling      Obesity      GILA (obstructive sleep apnea) 10/22/2018    CPAP intolerant     PVD (peripheral vascular disease) (H)     s/p left partial toe amputation     Renal stone      Tremor      Type 2 diabetes mellitus with diabetic polyneuropathy, with long-term current use of insulin (H)    *    EXAM:    Vitals: There were no vitals taken for this visit.  BMI: There is no height or weight on file to calculate BMI.    FOOT THREE VIEWS RIGHT  6/29/2022 2:32 PM      HISTORY: Wound dehiscence  COMPARISON: 6/5/2022                                                               IMPRESSION: Status post amputation of the great toe at the level of  the MTP joint. Surrounding soft tissue swelling with possible  ulceration. No definite evidence of acute osteomyelitis. Partial  amputation of the third toe at the level of the PIP joint. No acute  fracture or  malalignment.     MORRO HERNÁNDEZ MD        Derm: Three quarters of this incision appears to have healed more so laterally.  There is an area of wound dehiscence along the medial aspect of the incision.  This appears more superficial, compared to last time.  Now 0.2 cm in depth.  2.0cm L x 1.0cm W.  I did not contact bone.  No malodor.  No purulence.  No erythema.  The base appeared viable/vascular.     Please see picture below of right foot below.

## 2022-07-21 ENCOUNTER — HOSPITAL ENCOUNTER (OUTPATIENT)
Dept: MRI IMAGING | Facility: CLINIC | Age: 62
Discharge: HOME | End: 2022-07-21
Attending: PODIATRIST | Admitting: PODIATRIST
Payer: COMMERCIAL

## 2022-07-21 DIAGNOSIS — M84.474A: ICD-10-CM

## 2022-07-21 DIAGNOSIS — M86.171 OTHER ACUTE OSTEOMYELITIS OF RIGHT FOOT (H): ICD-10-CM

## 2022-07-21 PROCEDURE — 73720 MRI LWR EXTREMITY W/O&W/DYE: CPT | Mod: RT

## 2022-07-21 PROCEDURE — A9585 GADOBUTROL INJECTION: HCPCS | Performed by: PODIATRIST

## 2022-07-21 PROCEDURE — 73720 MRI LWR EXTREMITY W/O&W/DYE: CPT | Mod: 26 | Performed by: RADIOLOGY

## 2022-07-21 PROCEDURE — 255N000002 HC RX 255 OP 636: Performed by: PODIATRIST

## 2022-07-21 RX ORDER — GADOBUTROL 604.72 MG/ML
11 INJECTION INTRAVENOUS ONCE
Status: COMPLETED | OUTPATIENT
Start: 2022-07-21 | End: 2022-07-21

## 2022-07-21 RX ADMIN — GADOBUTROL 11 ML: 604.72 INJECTION INTRAVENOUS at 17:19

## 2022-07-21 NOTE — TELEPHONE ENCOUNTER
Details regarding ordering of an MRI available in another phone encounter. Encounter closed. Yoanna Jordan, ATC

## 2022-07-22 ENCOUNTER — HOSPITAL ENCOUNTER (INPATIENT)
Facility: CLINIC | Age: 62
LOS: 10 days | Discharge: HOME HEALTH | End: 2022-08-01
Attending: EMERGENCY MEDICINE | Admitting: STUDENT IN AN ORGANIZED HEALTH CARE EDUCATION/TRAINING PROGRAM
Payer: COMMERCIAL

## 2022-07-22 ENCOUNTER — TELEPHONE (OUTPATIENT)
Dept: PODIATRY | Facility: CLINIC | Age: 62
End: 2022-07-22

## 2022-07-22 DIAGNOSIS — M86.171 ACUTE OSTEOMYELITIS OF RIGHT FOOT (H): ICD-10-CM

## 2022-07-22 DIAGNOSIS — Z79.4 TYPE 2 DIABETES MELLITUS WITH DIABETIC PERIPHERAL ANGIOPATHY AND GANGRENE, WITH LONG-TERM CURRENT USE OF INSULIN (H): ICD-10-CM

## 2022-07-22 DIAGNOSIS — E11.52 TYPE 2 DIABETES MELLITUS WITH DIABETIC PERIPHERAL ANGIOPATHY AND GANGRENE, WITH LONG-TERM CURRENT USE OF INSULIN (H): ICD-10-CM

## 2022-07-22 DIAGNOSIS — M86.9 OSTEOMYELITIS OF GREAT TOE OF RIGHT FOOT (H): ICD-10-CM

## 2022-07-22 DIAGNOSIS — L97.909 DIABETIC ULCER OF LOWER EXTREMITY (H): ICD-10-CM

## 2022-07-22 DIAGNOSIS — M86.171 OTHER ACUTE OSTEOMYELITIS OF RIGHT FOOT (H): ICD-10-CM

## 2022-07-22 DIAGNOSIS — E11.622 DIABETIC ULCER OF LOWER EXTREMITY (H): ICD-10-CM

## 2022-07-22 DIAGNOSIS — L02.619 ABSCESS OF FOOT: ICD-10-CM

## 2022-07-22 DIAGNOSIS — B95.61 MSSA BACTEREMIA: ICD-10-CM

## 2022-07-22 DIAGNOSIS — R78.81 MSSA BACTEREMIA: ICD-10-CM

## 2022-07-22 DIAGNOSIS — T81.30XA WOUND DEHISCENCE: Primary | ICD-10-CM

## 2022-07-22 LAB
ANION GAP SERPL CALCULATED.3IONS-SCNC: 6 MMOL/L (ref 3–14)
BUN SERPL-MCNC: 20 MG/DL (ref 7–30)
CALCIUM SERPL-MCNC: 9.6 MG/DL (ref 8.5–10.1)
CHLORIDE BLD-SCNC: 100 MMOL/L (ref 94–109)
CO2 SERPL-SCNC: 27 MMOL/L (ref 20–32)
CREAT SERPL-MCNC: 1.02 MG/DL (ref 0.66–1.25)
CRP SERPL-MCNC: 70.9 MG/L (ref 0–8)
ERYTHROCYTE [DISTWIDTH] IN BLOOD BY AUTOMATED COUNT: 13.4 % (ref 10–15)
ERYTHROCYTE [SEDIMENTATION RATE] IN BLOOD BY WESTERGREN METHOD: 95 MM/HR (ref 0–20)
GFR SERPL CREATININE-BSD FRML MDRD: 84 ML/MIN/1.73M2
GLUCOSE BLD-MCNC: 104 MG/DL (ref 70–99)
HCT VFR BLD AUTO: 38.1 % (ref 40–53)
HGB BLD-MCNC: 11.7 G/DL (ref 13.3–17.7)
HOLD SPECIMEN: NORMAL
LACTATE SERPL-SCNC: 1.7 MMOL/L (ref 0.7–2)
LIPASE SERPL-CCNC: 156 U/L (ref 73–393)
MCH RBC QN AUTO: 27.6 PG (ref 26.5–33)
MCHC RBC AUTO-ENTMCNC: 30.7 G/DL (ref 31.5–36.5)
MCV RBC AUTO: 90 FL (ref 78–100)
PLATELET # BLD AUTO: 298 10E3/UL (ref 150–450)
POTASSIUM BLD-SCNC: 4.7 MMOL/L (ref 3.4–5.3)
RBC # BLD AUTO: 4.24 10E6/UL (ref 4.4–5.9)
SARS-COV-2 RNA RESP QL NAA+PROBE: NEGATIVE
SODIUM SERPL-SCNC: 133 MMOL/L (ref 133–144)
WBC # BLD AUTO: 7.6 10E3/UL (ref 4–11)

## 2022-07-22 PROCEDURE — C9803 HOPD COVID-19 SPEC COLLECT: HCPCS

## 2022-07-22 PROCEDURE — U0005 INFEC AGEN DETEC AMPLI PROBE: HCPCS | Performed by: EMERGENCY MEDICINE

## 2022-07-22 PROCEDURE — 99285 EMERGENCY DEPT VISIT HI MDM: CPT | Mod: 25

## 2022-07-22 PROCEDURE — 80048 BASIC METABOLIC PNL TOTAL CA: CPT | Performed by: EMERGENCY MEDICINE

## 2022-07-22 PROCEDURE — 85652 RBC SED RATE AUTOMATED: CPT | Performed by: EMERGENCY MEDICINE

## 2022-07-22 PROCEDURE — 36415 COLL VENOUS BLD VENIPUNCTURE: CPT | Performed by: EMERGENCY MEDICINE

## 2022-07-22 PROCEDURE — 96365 THER/PROPH/DIAG IV INF INIT: CPT

## 2022-07-22 PROCEDURE — 87149 DNA/RNA DIRECT PROBE: CPT | Performed by: EMERGENCY MEDICINE

## 2022-07-22 PROCEDURE — 85027 COMPLETE CBC AUTOMATED: CPT | Performed by: EMERGENCY MEDICINE

## 2022-07-22 PROCEDURE — 87077 CULTURE AEROBIC IDENTIFY: CPT | Performed by: EMERGENCY MEDICINE

## 2022-07-22 PROCEDURE — 120N000001 HC R&B MED SURG/OB

## 2022-07-22 PROCEDURE — 96368 THER/DIAG CONCURRENT INF: CPT

## 2022-07-22 PROCEDURE — 83605 ASSAY OF LACTIC ACID: CPT | Performed by: EMERGENCY MEDICINE

## 2022-07-22 PROCEDURE — 250N000011 HC RX IP 250 OP 636: Performed by: EMERGENCY MEDICINE

## 2022-07-22 PROCEDURE — 258N000003 HC RX IP 258 OP 636: Performed by: EMERGENCY MEDICINE

## 2022-07-22 PROCEDURE — 83690 ASSAY OF LIPASE: CPT | Performed by: EMERGENCY MEDICINE

## 2022-07-22 PROCEDURE — 99223 1ST HOSP IP/OBS HIGH 75: CPT | Mod: AI | Performed by: STUDENT IN AN ORGANIZED HEALTH CARE EDUCATION/TRAINING PROGRAM

## 2022-07-22 PROCEDURE — 86140 C-REACTIVE PROTEIN: CPT | Performed by: EMERGENCY MEDICINE

## 2022-07-22 RX ORDER — AMOXICILLIN 250 MG
1 CAPSULE ORAL 2 TIMES DAILY PRN
Status: DISCONTINUED | OUTPATIENT
Start: 2022-07-22 | End: 2022-08-01 | Stop reason: HOSPADM

## 2022-07-22 RX ORDER — CEFAZOLIN SODIUM 1 G/50ML
2500 SOLUTION INTRAVENOUS ONCE
Status: COMPLETED | OUTPATIENT
Start: 2022-07-22 | End: 2022-07-22

## 2022-07-22 RX ORDER — ONDANSETRON 2 MG/ML
4 INJECTION INTRAMUSCULAR; INTRAVENOUS EVERY 6 HOURS PRN
Status: DISCONTINUED | OUTPATIENT
Start: 2022-07-22 | End: 2022-07-28

## 2022-07-22 RX ORDER — ONDANSETRON 4 MG/1
4 TABLET, ORALLY DISINTEGRATING ORAL EVERY 6 HOURS PRN
Status: DISCONTINUED | OUTPATIENT
Start: 2022-07-22 | End: 2022-07-28

## 2022-07-22 RX ORDER — PROCHLORPERAZINE 25 MG
25 SUPPOSITORY, RECTAL RECTAL EVERY 12 HOURS PRN
Status: DISCONTINUED | OUTPATIENT
Start: 2022-07-22 | End: 2022-08-01 | Stop reason: HOSPADM

## 2022-07-22 RX ORDER — ACETAMINOPHEN 650 MG/1
650 SUPPOSITORY RECTAL EVERY 6 HOURS PRN
Status: DISCONTINUED | OUTPATIENT
Start: 2022-07-22 | End: 2022-08-01 | Stop reason: HOSPADM

## 2022-07-22 RX ORDER — LIDOCAINE 40 MG/G
CREAM TOPICAL
Status: DISCONTINUED | OUTPATIENT
Start: 2022-07-22 | End: 2022-08-01 | Stop reason: HOSPADM

## 2022-07-22 RX ORDER — ACETAMINOPHEN 325 MG/1
650 TABLET ORAL EVERY 6 HOURS PRN
Status: DISCONTINUED | OUTPATIENT
Start: 2022-07-22 | End: 2022-08-01 | Stop reason: HOSPADM

## 2022-07-22 RX ORDER — PROCHLORPERAZINE MALEATE 5 MG
10 TABLET ORAL EVERY 6 HOURS PRN
Status: DISCONTINUED | OUTPATIENT
Start: 2022-07-22 | End: 2022-08-01 | Stop reason: HOSPADM

## 2022-07-22 RX ORDER — AMOXICILLIN 250 MG
2 CAPSULE ORAL 2 TIMES DAILY PRN
Status: DISCONTINUED | OUTPATIENT
Start: 2022-07-22 | End: 2022-08-01 | Stop reason: HOSPADM

## 2022-07-22 RX ADMIN — TAZOBACTAM SODIUM AND PIPERACILLIN SODIUM 4.5 G: 500; 4 INJECTION, SOLUTION INTRAVENOUS at 19:34

## 2022-07-22 RX ADMIN — VANCOMYCIN HYDROCHLORIDE 2500 MG: 10 INJECTION, POWDER, LYOPHILIZED, FOR SOLUTION INTRAVENOUS at 20:10

## 2022-07-22 ASSESSMENT — ACTIVITIES OF DAILY LIVING (ADL)
ADLS_ACUITY_SCORE: 35
ADLS_ACUITY_SCORE: 20

## 2022-07-22 NOTE — ED PROVIDER NOTES
History   Chief Complaint:  Foot Pain       HPI   Crispin Rojas is a 62 year old male with history of diabetes on insulin who presents with newly diagnosed osteomyelitis in the right foot, referred by his podiatrist for admission and surgical management anticipated in 2 days.  Reports initially having an x-ray then an MRI and got results today.  Had swelling inferior to the ankle prompting work up.  Some chills, no fevers.  Chronic intermittent nausea, unchanged.  Has not taken anything for pain and does not want anything.  No other symptoms.    MRI Impression:  Osteomyelitis of the entirety of the first metatarsal.  *  Pathologic fracture deformity of the distal first metatarsal,  similar to recent radiographs.  *  Rim-enhancing fluid collection compatible with abscess surrounding  the distal first metatarsal measuring up to 4.0 cm.    Review of Systems  Ten system ROS reviewed and is negative except as above     Allergies:    Bactrim [Sulfamethoxazole W/Trimethoprim]  Sulfa Drugs  Niacin  Simvastatin    Medications:    amLODIPine (NORVASC) 5 MG tablet  aspirin (ASA) 81 MG EC tablet  atorvastatin (LIPITOR) 40 MG tablet  blood glucose (NO BRAND SPECIFIED) lancets standard  calcium carbonate (OS-NARA) 500 MG tablet  Cholecalciferol (VITAMIN D3 PO)  cholecalciferol 25 MCG (1000 UT) TABS  Co-Enzyme Q10 100 MG CAPS  collagenase (SANTYL) 250 UNIT/GM external ointment  Continuous Blood Gluc Sensor (FREESTYLE LUCERO 14 DAY SENSOR) MISC  ELIQUIS ANTICOAGULANT 5 MG tablet  furosemide (LASIX) 20 MG tablet  gabapentin (NEURONTIN) 100 MG capsule  Glucagon, rDNA, (GLUCAGON EMERGENCY) 1 MG KIT  hydrochlorothiazide (MICROZIDE) 12.5 MG capsule  insulin aspart (NOVOLOG PEN) 100 UNIT/ML pen  insulin degludec (TRESIBA FLEXTOUCH) 100 UNIT/ML pen  insulin pen needle (B-D U/F) 31G X 5 MM miscellaneous  ketoconazole (NIZORAL) 2 % external shampoo  lisinopril (ZESTRIL) 40 MG tablet  MAGNESIUM GLYCINATE PLUS PO  metFORMIN (GLUCOPHAGE XR)  500 MG 24 hr tablet  Multiple Vitamins-Minerals (MULTIVITAMIN PO)  Omega-3 Fatty Acids (OMEGA-3 FISH OIL PO)  omeprazole (PRILOSEC) 40 MG DR capsule  OZEMPIC, 1 MG/DOSE, 4 MG/3ML SOPN  potassium chloride ER (KLOR-CON M) 10 MEQ CR tablet  primidone (MYSOLINE) 50 MG tablet  tadalafil (CIALIS) 5 MG tablet        Past Medical History:       Patient Active Problem List   Diagnosis     Morbid obesity, unspecified obesity type (H)     Calculus of kidney     Chronic left shoulder pain     Type 2 diabetes mellitus with peripheral neuropathy (H)     Hyperlipidemia LDL goal <70     Essential hypertension     Right bundle branch block     GILA (obstructive sleep apnea)     Diabetic ulcer of lower extremity (H)     PVD (peripheral vascular disease) (H)     Scoliosis of thoracic spine, unspecified scoliosis type     Other sequelae following unspecified cerebrovascular disease     Cervical pain     Bilateral thoracic back pain     Osteomyelitis of great toe of right foot (H)     Acute deep vein thrombosis (DVT) of tibial vein of right lower extremity (H)     Abnormal CT of liver     Benign neoplasm of transverse colon     Cirrhosis of liver (H)     Constipation     Diverticular disease     Duodenitis     Epigastric pain     Hemorrhoids     Nausea and vomiting     Polyp of colon        Past Surgical History:      Past Surgical History:   Procedure Laterality Date     AMPUTATE FOOT Left 8/18/2016    Procedure: AMPUTATE FOOT;  Surgeon: Jack Younger DPM;  Location: [facility]     AMPUTATE TOE(S) Right 2/1/2016    Procedure: AMPUTATE TOE(S);  Surgeon: Rachelle Manriquez DPM, Pod;  Location: [facility]     AMPUTATE TOE(S) Right 8/2/2019    Procedure: Right fourth toe partial amputation for treatment of osteomyelitis.;  Surgeon: Jack Younger DPM;  Location: [facility]     AMPUTATE TOE(S) Left 11/8/2019    Procedure: Left second toe amputation at the metatarsophalangeal joint.;  Surgeon: Rachelle Manriquez DPM, Podiatry/Foot and Ankle Surgery;   Location: [facility]     AMPUTATE TOE(S) Right 6/5/2022    Procedure: AMPUTATION OF RIGHT GREAT TOE;  Surgeon: Wili Quiles DPM;  Location: [facility]     ANGIOGRAM       COLONOSCOPY       COMBINED CYSTOSCOPY, RETROGRADES, URETEROSCOPY, INSERT STENT Left 8/21/2016    Procedure: COMBINED CYSTOSCOPY, RETROGRADES, URETEROSCOPY, INSERT STENT;  Surgeon: Artemio Valenzuela MD;  Location: [facility]     COMBINED CYSTOSCOPY, RETROGRADES, URETEROSCOPY, LASER HOLMIUM LITHOTRIPSY URETER(S), INSERT STENT Left 10/3/2016    Procedure: COMBINED CYSTOSCOPY, RETROGRADES, URETEROSCOPY, LASER HOLMIUM LITHOTRIPSY URETER(S), INSERT STENT;  Surgeon: Artemio Valenzuela MD;  Location: [facility]     EXTRACORPOREAL SHOCK WAVE LITHOTRIPSY (ESWL) Left 9/8/2016    Procedure: EXTRACORPOREAL SHOCK WAVE LITHOTRIPSY (ESWL);  Surgeon: Artemio Valenzuela MD;  Location: [facility]     RECESSION GASTROCNEMIUS Right 2/1/2016    Procedure: RECESSION GASTROCNEMIUS;  Surgeon: Rachelle Manriquez DPM, Pod;  Location: [facility]        Family History:      Family History   Problem Relation Age of Onset     Arthritis Mother         SLE     Connective Tissue Disorder Mother         lupus     Diabetes Mother      Cerebrovascular Disease Mother      Cancer Mother      Diabetes Father      Diabetes Maternal Grandfather      Diabetes Sister        Social History:  Unaccompanied    Physical Exam     Patient Vitals for the past 24 hrs:   BP Temp Pulse Resp SpO2   07/22/22 1622 134/80 98.6  F (37  C) 84 16 98 %       Physical Exam  Eyes:  Sclera white; Pupils are equal and round  ENT:    External ears and nares normal  CV:  Rate as above with regular rhythm   Resp:  Breath sounds clear and equal bilaterally    Non-labored, no retractions or accessory muscle use  MS:  Moves all extremities  Skin:  Warm and dry; L ankle swollen without erythema, foot wrapped and in post-op shoe  Neuro:  Speech is normal and fluent. No apparent deficit.      Emergency Department Course   ECG  ECG  results from 05/10/21   EKG 12-lead, tracing only     Value    Interpretation ECG Click View Image link to view waveform and result       Laboratory:  Labs Ordered and Resulted from Time of ED Arrival to Time of ED Departure   CBC WITH PLATELETS - Abnormal       Result Value    WBC Count 7.6      RBC Count 4.24 (*)     Hemoglobin 11.7 (*)     Hematocrit 38.1 (*)     MCV 90      MCH 27.6      MCHC 30.7 (*)     RDW 13.4      Platelet Count 298     BASIC METABOLIC PANEL - Abnormal    Sodium 133      Potassium 4.7      Chloride 100      Carbon Dioxide (CO2) 27      Anion Gap 6      Urea Nitrogen 20      Creatinine 1.02      Calcium 9.6      Glucose 104 (*)     GFR Estimate 84     CRP INFLAMMATION - Abnormal    CRP Inflammation 70.9 (*)    ERYTHROCYTE SEDIMENTATION RATE AUTO - Abnormal    Erythrocyte Sedimentation Rate 95 (*)    COVID-19 VIRUS (CORONAVIRUS) BY PCR - Normal    SARS CoV2 PCR Negative     LACTIC ACID WHOLE BLOOD - Normal    Lactic Acid 1.7     LIPASE - Normal    Lipase 156     BLOOD CULTURE   BLOOD CULTURE        Procedures    Interventions:  Medications   lidocaine 1 % 0.1-1 mL (has no administration in time range)   lidocaine (LMX4) cream (has no administration in time range)   sodium chloride (PF) 0.9% PF flush 3 mL (3 mLs Intracatheter Given 7/22/22 1931)   sodium chloride (PF) 0.9% PF flush 3 mL (has no administration in time range)   melatonin tablet 1 mg (has no administration in time range)   acetaminophen (TYLENOL) tablet 650 mg (has no administration in time range)     Or   acetaminophen (TYLENOL) Suppository 650 mg (has no administration in time range)   senna-docusate (SENOKOT-S/PERICOLACE) 8.6-50 MG per tablet 1 tablet (has no administration in time range)     Or   senna-docusate (SENOKOT-S/PERICOLACE) 8.6-50 MG per tablet 2 tablet (has no administration in time range)   ondansetron (ZOFRAN ODT) ODT tab 4 mg (has no administration in time range)     Or   ondansetron (ZOFRAN) injection 4 mg (has  no administration in time range)   prochlorperazine (COMPAZINE) injection 10 mg (has no administration in time range)     Or   prochlorperazine (COMPAZINE) tablet 10 mg (has no administration in time range)     Or   prochlorperazine (COMPAZINE) suppository 25 mg (has no administration in time range)   piperacillin-tazobactam (ZOSYN) intermittent infusion 4.5 g (0 g Intravenous Stopped 7/22/22 2010)   vancomycin (VANCOCIN) 2,500 mg in sodium chloride 0.9 % 500 mL intermittent infusion (0 mg Intravenous Stopped 7/22/22 2305)        Disposition:  Admitted to Dr. Sandoval    Impression & Plan     Medical Decision Making:  MRI reviewed, no further imaging is needed.  Blood work shows no evidence of associated sepsis or severe sepsis.  CRP and ESR obtained for trending purposes.  Broad spectrum antibiotics confirmed with ED pharmacist and were given.  Admission arranged.    Diagnosis:    ICD-10-CM    1. Acute osteomyelitis of right foot (H)  M86.171           Ivette Ji MD  07/23/22 0108

## 2022-07-22 NOTE — TELEPHONE ENCOUNTER
"I spoke to Pat on the phone this morning about the MRI results.  MRI is showing osteomyelitis of the \" entirety of the first metatarsal,\" pathologic fracture and abscess near the metatarsal head.     I explained that surgical intervention is likely needed.  Bone biopsy to confirm osteomyelitis was discussed as well as first ray amputation.  Given the extent of this likely infection, hospitalization is recommended.  He agrees.    We discussed admission to [facility]. They are unable to take patients at this time.  Therefore he was contacted by my MA and the plan is admission to [facility].  I have not seen Pat in the last 24 hours and therefore the protocol is for him to present to the emergency department today.  He will do this.    I do think admission is warranted for IV antibiotics and bone biopsy either tomorrow or Sunday.  He likely needs a partial or total first ray amputation.    Pat reports feeling fatigued over the past month.  He discussed this with his primary care provider on 7/6/2022.  He also reports that his appetite is not normal.      He is status post right hallux amputation on 6/5/2022 for treatment of osteomyelitis. This was done by Dr. Quiles.  He was hospitalist at the time.  Pat  followed with me in clinic postoperatively.  He had wound dehiscence along the medial aspect of the incision.  X-rays were taken 2 days ago, more as a routine than concern for underlying infection.  The pathologic fracture was unexpected.  Clinically, there were no signs of infection.    If admission is granted, I will see Pat tomorrow.    Valentino Molina DPM, FACFAS, MS  [facility] Department of Podiatry/Foot & Ankle Surgery  [phone]    "

## 2022-07-22 NOTE — ED TRIAGE NOTES
Pt sent to Ed to be admitting for surgery on Sunday due to an osteomyelitis in his right foot.  Refer to note in chart     Triage Assessment     Row Name 07/22/22 8597       Triage Assessment (Adult)    Airway WDL WDL       Respiratory WDL    Respiratory WDL WDL

## 2022-07-23 LAB
ANION GAP SERPL CALCULATED.3IONS-SCNC: 6 MMOL/L (ref 3–14)
BUN SERPL-MCNC: 17 MG/DL (ref 7–30)
CALCIUM SERPL-MCNC: 8.9 MG/DL (ref 8.5–10.1)
CHLORIDE BLD-SCNC: 104 MMOL/L (ref 94–109)
CO2 SERPL-SCNC: 27 MMOL/L (ref 20–32)
CREAT SERPL-MCNC: 0.94 MG/DL (ref 0.66–1.25)
ENTEROCOCCUS FAECALIS: NOT DETECTED
ENTEROCOCCUS FAECIUM: NOT DETECTED
ERYTHROCYTE [DISTWIDTH] IN BLOOD BY AUTOMATED COUNT: 13.4 % (ref 10–15)
GFR SERPL CREATININE-BSD FRML MDRD: >90 ML/MIN/1.73M2
GLUCOSE BLD-MCNC: 97 MG/DL (ref 70–99)
GLUCOSE BLDC GLUCOMTR-MCNC: 106 MG/DL (ref 70–99)
GLUCOSE BLDC GLUCOMTR-MCNC: 122 MG/DL (ref 70–99)
GLUCOSE BLDC GLUCOMTR-MCNC: 125 MG/DL (ref 70–99)
GLUCOSE BLDC GLUCOMTR-MCNC: 99 MG/DL (ref 70–99)
HCT VFR BLD AUTO: 33.1 % (ref 40–53)
HGB BLD-MCNC: 10.3 G/DL (ref 13.3–17.7)
LISTERIA SPECIES (DETECTED/NOT DETECTED): NOT DETECTED
MCH RBC QN AUTO: 28.1 PG (ref 26.5–33)
MCHC RBC AUTO-ENTMCNC: 31.1 G/DL (ref 31.5–36.5)
MCV RBC AUTO: 90 FL (ref 78–100)
PLATELET # BLD AUTO: 275 10E3/UL (ref 150–450)
POTASSIUM BLD-SCNC: 4.1 MMOL/L (ref 3.4–5.3)
RBC # BLD AUTO: 3.67 10E6/UL (ref 4.4–5.9)
SODIUM SERPL-SCNC: 137 MMOL/L (ref 133–144)
STAPHYLOCOCCUS AUREUS: DETECTED
STAPHYLOCOCCUS EPIDERMIDIS: NOT DETECTED
STAPHYLOCOCCUS LUGDUNENSIS: NOT DETECTED
STREPTOCOCCUS AGALACTIAE: NOT DETECTED
STREPTOCOCCUS ANGINOSUS GROUP: NOT DETECTED
STREPTOCOCCUS PNEUMONIAE: NOT DETECTED
STREPTOCOCCUS PYOGENES: NOT DETECTED
STREPTOCOCCUS SPECIES: NOT DETECTED
TSH SERPL DL<=0.005 MIU/L-ACNC: 1.18 MU/L (ref 0.4–4)
WBC # BLD AUTO: 5.5 10E3/UL (ref 4–11)

## 2022-07-23 PROCEDURE — 99255 IP/OBS CONSLTJ NEW/EST HI 80: CPT | Mod: 57 | Performed by: PODIATRIST

## 2022-07-23 PROCEDURE — 250N000011 HC RX IP 250 OP 636: Performed by: STUDENT IN AN ORGANIZED HEALTH CARE EDUCATION/TRAINING PROGRAM

## 2022-07-23 PROCEDURE — 80048 BASIC METABOLIC PNL TOTAL CA: CPT | Performed by: STUDENT IN AN ORGANIZED HEALTH CARE EDUCATION/TRAINING PROGRAM

## 2022-07-23 PROCEDURE — 85027 COMPLETE CBC AUTOMATED: CPT | Performed by: STUDENT IN AN ORGANIZED HEALTH CARE EDUCATION/TRAINING PROGRAM

## 2022-07-23 PROCEDURE — 36415 COLL VENOUS BLD VENIPUNCTURE: CPT | Performed by: STUDENT IN AN ORGANIZED HEALTH CARE EDUCATION/TRAINING PROGRAM

## 2022-07-23 PROCEDURE — 258N000003 HC RX IP 258 OP 636: Performed by: STUDENT IN AN ORGANIZED HEALTH CARE EDUCATION/TRAINING PROGRAM

## 2022-07-23 PROCEDURE — 99232 SBSQ HOSP IP/OBS MODERATE 35: CPT | Performed by: STUDENT IN AN ORGANIZED HEALTH CARE EDUCATION/TRAINING PROGRAM

## 2022-07-23 PROCEDURE — 250N000011 HC RX IP 250 OP 636: Performed by: PODIATRIST

## 2022-07-23 PROCEDURE — 84443 ASSAY THYROID STIM HORMONE: CPT | Performed by: STUDENT IN AN ORGANIZED HEALTH CARE EDUCATION/TRAINING PROGRAM

## 2022-07-23 PROCEDURE — 120N000001 HC R&B MED SURG/OB

## 2022-07-23 RX ORDER — CEFAZOLIN SODIUM 1 G/50ML
2500 SOLUTION INTRAVENOUS ONCE
Status: DISCONTINUED | OUTPATIENT
Start: 2022-07-23 | End: 2022-07-23

## 2022-07-23 RX ORDER — NICOTINE POLACRILEX 4 MG
15-30 LOZENGE BUCCAL
Status: DISCONTINUED | OUTPATIENT
Start: 2022-07-23 | End: 2022-08-01 | Stop reason: HOSPADM

## 2022-07-23 RX ORDER — DEXTROSE MONOHYDRATE 25 G/50ML
25-50 INJECTION, SOLUTION INTRAVENOUS
Status: DISCONTINUED | OUTPATIENT
Start: 2022-07-23 | End: 2022-08-01 | Stop reason: HOSPADM

## 2022-07-23 RX ADMIN — VANCOMYCIN HYDROCHLORIDE 1250 MG: 10 INJECTION, POWDER, LYOPHILIZED, FOR SOLUTION INTRAVENOUS at 15:47

## 2022-07-23 RX ADMIN — TAZOBACTAM SODIUM AND PIPERACILLIN SODIUM 4.5 G: 500; 4 INJECTION, SOLUTION INTRAVENOUS at 14:48

## 2022-07-23 RX ADMIN — TAZOBACTAM SODIUM AND PIPERACILLIN SODIUM 4.5 G: 500; 4 INJECTION, SOLUTION INTRAVENOUS at 20:39

## 2022-07-23 ASSESSMENT — ACTIVITIES OF DAILY LIVING (ADL)
ADLS_ACUITY_SCORE: 22
ADLS_ACUITY_SCORE: 20
ADLS_ACUITY_SCORE: 22
ADLS_ACUITY_SCORE: 20
ADLS_ACUITY_SCORE: 20
ADLS_ACUITY_SCORE: 22

## 2022-07-23 NOTE — PLAN OF CARE
ROOM # 208    Living Situation (if not independent, order SW consult): At home  : Benjie, [phone]    Activity level at baseline: Independent  Activity level on admit: Independent    Who will be transporting you at discharge: Benjie    Patient registered to observation; given Patient Bill of Rights; given the opportunity to ask questions about observation status and their plan of care.  Patient has been oriented to the observation room, bathroom and call light is in place.    Discussed discharge goals and expectations with patient/family.

## 2022-07-23 NOTE — PHARMACY-ADMISSION MEDICATION HISTORY
Admission medication history interview status for this patient is complete. See [facility] admission navigator for allergy information, prior to admission medications and immunization status.     Medication history interview done, indicate source(s): Patient  Medication history resources (including written lists, pill bottles, clinic record): [facility] and [employer]  Pharmacy: [id number] - [place], [place] - [address] [facility] AT [place] [address] & [facility]      Changes made to PTA medication list:  Added: None  Changed: None  Reported as Not Taking: None  Removed: None    Actions taken by pharmacist (provider contacted, etc): Sticky note to provider     Additional medication history information:None    Medication reconciliation/reorder completed by provider prior to medication history?  N   (Y/N)     For patients on insulin therapy:   Do you use sliding scale insulin based on blood sugars? 3 U per 10 carbs  Do you typically eat three meals a day? yes  How many times do you check your blood glucose per day? 2-3  How many episodes of hypoglycemia do you typically have per month? none  Do you have a Continuous Glucose Monitor (CGM)?  yes  Prior to Admission medications    Medication Sig Last Dose Taking? Auth Provider Long Term End Date   amLODIPine (NORVASC) 5 MG tablet Take 1 tablet (5 mg) by mouth 2 times daily Take one tablet twice daily  SEE MD THIS QUARTER 7/22/2022 at Unknown time Yes Johann Serna, DO Yes    aspirin (ASA) 81 MG EC tablet Take 1 tablet (81 mg) by mouth daily 7/22/2022 at Unknown time Yes Uli Drummond MD No    atorvastatin (LIPITOR) 40 MG tablet Take 1 tablet (40 mg) by mouth daily Take one tablet daily SEE PROVIDER FOR NEXT REFILL 7/22/2022 at Unknown time Yes Johann Serna, DO Yes    calcium carbonate (OS-NARA) 500 MG tablet Take 1 tablet by mouth 2 times daily 7/22/2022 at Unknown time Yes Reported, Patient     Cholecalciferol (VITAMIN D3) 25 MCG (1000 UT) CAPS Take 1,000 Units by  mouth daily  7/22/2022 at Unknown time Yes Reported, Patient     Co-Enzyme Q10 100 MG CAPS Take 100 mg by mouth daily  7/22/2022 at Unknown time Yes Aden Lopez MD     collagenase (SANTYL) 250 UNIT/GM external ointment Apply topically daily Past Week at Unknown time Yes Rachelle Manriquez DPM, Podiatry/Foot and Ankle Surgery     ELIQUIS ANTICOAGULANT 5 MG tablet Take 1 tablet (5 mg) by mouth 2 times daily 7/22/2022 at Unknown time Yes Johann Serna,  No    gabapentin (NEURONTIN) 100 MG capsule Take 400 mg by mouth 3 times daily 7/22/2022 at Unknown time Yes Unknown, Entered By History Yes    hydrochlorothiazide (MICROZIDE) 12.5 MG capsule Take 1 capsule (12.5 mg) by mouth every morning 7/22/2022 at Unknown time Yes Singh Theodore MD Yes    insulin aspart (NOVOLOG PEN) 100 UNIT/ML pen Use daily with meals, up to 70 units a day  Patient taking differently: No sig reported 7/22/2022 at Unknown time Yes Shira Montanez NP Yes    insulin degludec (TRESIBA FLEXTOUCH) 100 UNIT/ML pen 45-55 units subcutaneous daily  Patient taking differently: Inject 55 Units Subcutaneous daily 7/22/2022 at Unknown time Yes Shira Montanez NP     lisinopril (ZESTRIL) 40 MG tablet Take 1 tablet (40 mg) by mouth daily 7/22/2022 at Unknown time Yes Singh Theodore MD Yes    MAGNESIUM GLYCINATE PLUS PO Take 400 mg by mouth 2 times daily 7/22/2022 at Unknown time Yes Reported, Patient     metFORMIN (GLUCOPHAGE XR) 500 MG 24 hr tablet Take two tablets by mouth twice daily 7/22/2022 at Unknown time Yes Shira Montanez NP Yes    Multiple Vitamins-Minerals (MULTIVITAMIN PO) Take 1 tablet by mouth daily  7/22/2022 at Unknown time Yes Reported, Patient     Omega-3 Fatty Acids (OMEGA-3 FISH OIL PO) Take 1 g by mouth 2 times daily (with meals)  7/22/2022 at Unknown time Yes Reported, Patient     omeprazole (PRILOSEC) 40 MG DR capsule Take 40 mg by mouth daily 7/22/2022 at Unknown time Yes Reported, Patient     OZEMPIC, 1  MG/DOSE, 4 MG/3ML SOPN INJECT 1 MG SUBCUTANEOUSLY AS DIRECTED EVERY 7 DAYS ON WEDNESDAYS Past Week at Unknown time Yes Reported, Patient No    primidone (MYSOLINE) 50 MG tablet TAKE 1 TABLET BY MOUTH THREE TIMES DAILY 7/22/2022 at Unknown time Yes Reported, Patient Yes    blood glucose (NO BRAND SPECIFIED) lancets standard Use to test blood sugar 3 times daily or as directed.   Jasmin Arteaga APRN CNP     Continuous Blood Gluc Sensor (FREESTYLE LUCERO 14 DAY SENSOR) [facility] USE ONE EVERY 14 DAYS   Shira Montanez, NP     furosemide (LASIX) 20 MG tablet Take 1 tablet (20 mg) by mouth daily as needed (for leg swelling) As needed for edema  at PRN  Singh Theodore MD Yes    insulin pen needle (B-D U/F) 31G X 5 MM miscellaneous USE TO INJECT LANTUS TWICE DAILY AND NOVOLOG THREE TIMES DAILY AS DIRECTED   Shira Montanez NP     ketoconazole (NIZORAL) 2 % external shampoo APPLY TOPICALLY DAILY AS NEEDED FOR ITCHING OR IRRITATION. SEE MD AFTER 6 WEEKS  at PRN  Johann Serna DO     potassium chloride ER (KLOR-CON M) 10 MEQ CR tablet Take 1 tablet (10 mEq) by mouth 2 times daily  Patient taking differently: Take 10 mEq by mouth 2 times daily as needed (takes when taking diuretic)  at PRN  Johann Serna DO     tadalafil (CIALIS) 5 MG tablet TAKE 1 TABLET BY MOUTH EVERY DAY AS NEEDED one hour before intercourse  at PRN  Aden Lopez MD Yes

## 2022-07-23 NOTE — ED NOTES
[facility]  ED Nurse Handoff Report    Crispin Rojas is a 62 year old male   ED Chief complaint: Foot Pain  . ED Diagnosis:   Final diagnoses:   Acute osteomyelitis of right foot (H)     Allergies:   Allergies   Allergen Reactions     Bactrim [Sulfamethoxazole W/Trimethoprim] Nausea and Vomiting     Sulfa Drugs Nausea     Niacin Swelling     SIMCOR caused edema in extremities     Simvastatin Swelling     SIMCOR caused edema in extremities       Code Status: Full Code  Activity level - Baseline/Home:  Independent. Activity Level - Current:   Stand by Assist. Lift room needed: No. Bariatric: No   Needed: No   Isolation: No. Infection: Not Applicable.     Vital Signs:   Vitals:    07/22/22 1622   BP: 134/80   Pulse: 84   Resp: 16   Temp: 98.6  F (37  C)   SpO2: 98%       Cardiac Rhythm:  ,      Pain level:    Patient confused: No. Patient Falls Risk: Yes.   Elimination Status: Has voided   Patient Report - Initial Complaint: Foot Pain. Focused Assessment:    Crispin Rojas is a 62 year old male with history of diabetes on insulin who presents with newly diagnosed osteomyelitis in the right foot, referred by his podiatrist for admission and surgical management anticipated in 2 days.  Reports initially having an x-ray then an MRI and got results today.  Had swelling inferior to the ankle prompting work up.  Some chills, no fevers.  Chronic intermittent nausea, unchanged.  Has not taken anything for pain and does not want anything.  No other symptoms.     MRI Impression:  Osteomyelitis of the entirety of the first metatarsal.  *  Pathologic fracture deformity of the distal first metatarsal,  similar to recent radiographs.  *  Rim-enhancing fluid collection compatible with abscess surrounding  the distal first metatarsal measuring up to 4.0 cm.  Tests Performed:   Labs Ordered and Resulted from Time of ED Arrival to Time of ED Departure   CBC WITH PLATELETS - Abnormal       Result Value    WBC  Count 7.6      RBC Count 4.24 (*)     Hemoglobin 11.7 (*)     Hematocrit 38.1 (*)     MCV 90      MCH 27.6      MCHC 30.7 (*)     RDW 13.4      Platelet Count 298     BASIC METABOLIC PANEL - Abnormal    Sodium 133      Potassium 4.7      Chloride 100      Carbon Dioxide (CO2) 27      Anion Gap 6      Urea Nitrogen 20      Creatinine 1.02      Calcium 9.6      Glucose 104 (*)     GFR Estimate 84     CRP INFLAMMATION - Abnormal    CRP Inflammation 70.9 (*)    COVID-19 VIRUS (CORONAVIRUS) BY PCR - Normal    SARS CoV2 PCR Negative     LACTIC ACID WHOLE BLOOD - Normal    Lactic Acid 1.7     LIPASE - Normal    Lipase 156     ERYTHROCYTE SEDIMENTATION RATE AUTO   BLOOD CULTURE   BLOOD CULTURE     No orders to display     Treatments provided: IV abx, (see MAR)  Family Comments: NA  OBS brochure/video discussed/provided to patient:  Yes  ED Medications:   Medications   vancomycin (VANCOCIN) 2,500 mg in sodium chloride 0.9 % 500 mL intermittent infusion (2,500 mg Intravenous New Bag 7/22/22 2010)   piperacillin-tazobactam (ZOSYN) intermittent infusion 4.5 g (0 g Intravenous Stopped 7/22/22 2010)     Drips infusing:  No  For the majority of the shift, the patient's behavior Green. Interventions performed were none.    Sepsis treatment initiated: No     Patient tested for COVID 19 prior to admission: YES    ED Nurse Name/Phone Number: Melina Rashid RN,   8:14 PM    RECEIVING UNIT ED HANDOFF REVIEW    Above ED Nurse Handoff Report was reviewed: Yes  Reviewed by: Ariel Bond RN on July 22, 2022 at 9:39 PM

## 2022-07-23 NOTE — PHARMACY-VANCOMYCIN DOSING SERVICE
"Pharmacy Vancomycin Initial Note  Date of Service 2022  Patient's  1960  62 year old, male    Indication: Osteomyelitis    Current estimated CrCl = Estimated Creatinine Clearance: 103.4 mL/min (based on SCr of 0.94 mg/dL).    Creatinine for last 3 days  2022:  5:05 PM Creatinine 1.02 mg/dL  2022:  5:44 AM Creatinine 0.94 mg/dL    Recent Vancomycin Level(s) for last 3 days  No results found for requested labs within last 72 hours.      Vancomycin IV Administrations (past 72 hours)                   vancomycin (VANCOCIN) 2,500 mg in sodium chloride 0.9 % 500 mL intermittent infusion (mg) 2,500 mg New Bag 22                Nephrotoxins and other renal medications (From now, onward)    Start     Dose/Rate Route Frequency Ordered Stop    22 1430  vancomycin 1250 mg in 0.9% NaCl 250 mL intermittent infusion 1,250 mg         1,250 mg  over 90 Minutes Intravenous EVERY 12 HOURS 22 1404      22 1330  piperacillin-tazobactam (ZOSYN) intermittent infusion 4.5 g        Note to Pharmacy: For [facility], [facility] and [facility]: For Zosyn-naive patients, use the \"Zosyn initial dose + extended infusion\" order panel.    4.5 g  200 mL/hr over 30 Minutes Intravenous ONCE 22 1308            Contrast Orders - past 72 hours (72h ago, onward)    None          [employer] Prediction of Planned Initial Vancomycin Regimen  Loading dose: N/A  Regimen: 1250 mg IV every 12 hours.  Start time: 15:01 on 2022  Exposure target: AUC24 (range)400-600 mg/L.hr   AUC24,ss: 531 mg/L.hr  Probability of AUC24 > 400: 78 %  Ctrough,ss: 17.2 mg/L  Probability of Ctrough,ss > 20: 37 %  Probability of nephrotoxicity (Lodise LYNN ): 13 %          Plan:  1. Start vancomycin  1,250 mg IV q12h. Loading dose of 2,500 mg IV x once given in ED 2022 at .  2. Vancomycin monitoring method: AUC  3. Vancomycin therapeutic monitoring goal: 400-600 mg*h/L  4. Pharmacy will check vancomycin levels as appropriate in " 1-3 Days.    5. Serum creatinine levels will be ordered daily for the first week of therapy and at least twice weekly for subsequent weeks.      Misa Hensley RPH

## 2022-07-23 NOTE — PLAN OF CARE
PRIMARY DIAGNOSIS: OSTEOMYELITIS OF RT FOOT  OUTPATIENT/OBSERVATION GOALS TO BE MET BEFORE DISCHARGE:  1. Pain Status: Pain free.    2. Return to near baseline physical activity: Yes    3. Cleared for discharge by consultants (if involved): No    Discharge Planner Nurse   Safe discharge environment identified: Yes  Barriers to discharge: Yes       Entered by: Grace Frerell RN 07/23/2022 6:36 AM     Patient is Aox4, VSS, NPO at midnight due to surgical procedure, independent in the room, IV in the LT arm is saline locked, RN managed K, surgical consult has been placed, compression devices are on both LE's, sleeping well, able to make needs known, will continue to monitor and provide cares.      Please review provider order for any additional goals.   Nurse to notify provider when observation goals have been met and patient is ready for discharge.Goal Outcome Evaluation:

## 2022-07-23 NOTE — PROGRESS NOTES
[facility]    Medicine Progress Note - Hospitalist Service  Date of Admission: 7/22/2022     Assessment & Plan         Crispin Rojas is a 62 year old male with past medical history significant for type 2 diabetes mellitus, hypertension, hyperlipidemia, CVA, GILA, obesity, PVD, and recent right great toe amputation 06/2022 with persistent nonhealing surgical wound who presented to [facility] on 7/22/2022 with increased right foot pain and radiographic evidence of continued osteomyelitis with associated pathologic fracture and acute abscess.    Extensive, Progressive First Metatarsal Osteomyelitis w/Pathologic Fracture & Distal Abscess  Recent Right Great Toe Amputation 2/2 Grade 2 Proximal Osteomyelitis w/ Nonhealing Surgical Wound  Recent first digit amputation of RLE 06/2022 with persistent wound dehiscence. Develops acute, severe pain x2 weeks. MRI outpatient showing progressive osteomyelitis of entire first metatarsal with pathologic fracture and distal fluid collection consistent with developing abscess. Started on Vanc/Zosyn. Podiatry consulting and recommending I&D with bone biopsy 7/24/2022. ID consulted due to progressive osteo.   -Podiatry consulted, appreciate recommendations  -ID consulted, appreciate recommendations  -Vancomycin (PharmD to dose)  -Zosyn per podiatry  -NPO @ 0000    Fatigue  Progressive fatigue since recent surgery. Hgb chronically low; iron studies within normal limits. Likely fatigued due to ongoing infection, but will also check TSH.  -TSH    Type 2 Diabetes Mellitus  Holding basal insulin due to prolonged NPO this morning in anticipation of possible surgical intervention. MDSSI.    Normocytic Anemia: Hgb at baseline 11.7 on admission. Slightly decreased to 10.3 overnight; suspect dilutional.  Essential Hypertension: Holding PTA antihypertensives prior to surgery. Normotensive today.  GILA: CPAP.  Obesity: Complicates cares.  Peripheral Vascular  "Disease: Holding PTA ASA.     Diet: Orders Placed This Encounter      NPO per Anesthesia Guidelines for Procedure/Surgery Except for: Meds      Moderate Consistent Carb (60 g CHO per Meal) Diet  DVT Prophylaxis: Pneumatic Compression Devices  Mccarthy Catheter: Not present  Code Status: Full Code    Expected discharge: Likely 2-3 days pending surgery and treatment of infection.    The patient's care was discussed with the Bedside Nurse and Patient.    Chapincito Johnson MD, S  Hospitalist Service  [facility]    Securely message with the Vocera Web Console (learn more here)  Text page via [facility] Paging/Directory    ______________________________________________________________________    Interval History   Nursing notes reviewed; no acute events overnight. Patient feeling well today. Endorses some continued pain of right foot. No fever, chills, nausea, vomiting, diarrhea. Does have significant fatigue since last surgery. No new symptoms.    A full 10+ point review of systems was performed and found to be negative with the exception of those items noted here.    Physical Exam   Vital signs:  Temp: 98.1  F (36.7  C) Temp src: Oral BP: 132/61 Pulse: 59   Resp: 16 SpO2: 95 % O2 Device: None (Room air)   Height: 180.3 cm (5' 11\") Weight: 105.7 kg (233 lb)  Estimated body mass index is 32.5 kg/m  as calculated from the following:    Height as of this encounter: 1.803 m (5' 11\").    Weight as of this encounter: 105.7 kg (233 lb).    General: Very pleasant male resting comfortably in hospital bed.  Awake, alert, interactive.  HEENT: Normocephalic, atraumatic.  PERRL, EOMI.  Conjunctiva clear, sclerae anicteric.  Mucous membranes moist.  Cardiac: Regular rate and rhythm without murmur, gallop, or rub.  No peripheral edema.  Respiratory: Normal work of breathing.  Clear to auscultation bilaterally without wheezing, rales, or rhonchi.  GI: Normal, active bowel sounds.  Abdomen soft, nontender, nondistended.  : " Deferred.  Musculoskeletal: Right foot wrapped with ace bandage.  Skin: No rashes or abrasions on exposed skin.  Neurologic: Alert and oriented x4.  Cranial nerves II through XII grossly intact.  Psychologic: Appropriate mood and affect.    Data   All laboratory results and other diagnostic data from the past 24 hours is available in Epic and has been personally reviewed.    Recent Labs   Lab 07/23/22  0953 07/23/22  0544 07/22/22  1705   WBC  --  5.5 7.6   HGB  --  10.3* 11.7*   MCV  --  90 90   PLT  --  275 298   NA  --  137 133   POTASSIUM  --  4.1 4.7   CHLORIDE  --  104 100   CO2  --  27 27   BUN  --  17 20   CR  --  0.94 1.02   ANIONGAP  --  6 6   NARA  --  8.9 9.6   GLC 99 97 104*   LIPASE  --   --  156     No results found for this or any previous visit (from the past 24 hour(s)).  I personally reviewed: no images or EKG's today.

## 2022-07-23 NOTE — PLAN OF CARE
A&Ox4, independent, tolerating regular diet, NPO at midnight, heart sounds- WNL, lungs- clear, bowels- active, voiding without difficulty, IV SL, continuing with IV zosyn and vanco, plan for surgery tomorrow, podiatry following, ID consulted. R foot redressed. Surgical shower done.

## 2022-07-23 NOTE — H&P
[facility]  Hospitalist Admission Note  Name: Crispin Rojas    MRN: [id number]  YOB: 1960    Age: 62 year old  Date of admission: 7/22/2022  Primary care provider: Johann Serna    Chief Complaint:  Right foot infection    Assessment and Plan:   Right first metatarsal osteomyelitis with abscess:  Patient reports first digit amputation in early June.  Reports that this did not heal well.  He reports severe pain in his right foot over the past couple of weeks.  MRI of the right foot shows osteomyelitis of the entire first metatarsal with pathologic fracture deformity of the distal first metatarsal, with rim-enhancing fluid collection compatible with abscess surrounding the distal first metatarsal measuring up to 4 cm.  CRP 70.  Patient was initiated on vancomycin/Zosyn in the emergency department.  Per report, podiatry have been consulted and plan for surgery on Sunday.  -Podiatry consultation  -Continue vancomycin/Zosyn  -We will keep n.p.o. at midnight in the case that he would need surgery tomorrow 7/23  -Hold PTA DOAC    T2DM:  Known history of type 2 diabetes.  Patient was unable to report to me the exact diabetic regimen that he is on.  A1c is historically around 6-7.    -We will await med rec for formal diabetic dosing of lantus (patient unsure how much he takes)  -Medium dose sliding scale insulin  -1 unit per 15 g carbohydrate    Normocytic anemia:  Hemoglobin 11.7.  Normocytic.  No evidence for bleeding.  -Daily CBC    HTN: PTA amlodipine, lisinopril, hold hydrochlorothiazide, hold Lasix  GILA: CPAP if uses at home  Obesity: Complicates cares  PVD: Hold PTA aspirin for now    Patient remains in fast track. Formal disposition to be determined by ED provider.         Clinically Significant Risk Factors Present on Admission               # Coagulation Defect: home medication list includes an anticoagulant medication   # Hypertension: home medication list includes  "antihypertensive(s)    # DMII: A1C = N/A within past 3 months  # Obesity: Estimated body mass index is 32.57 kg/m  as calculated from the following:    Height as of 7/20/22: 1.803 m (5' 11\").    Weight as of 7/20/22: 105.9 kg (233 lb 8 oz).        DVT Prophylaxis: VTE Prophylaxis contraindicated due to procedure  Code Status: Full Code  Discharge Dispo: Pending improvement  Estimated Disch Date / # of Days until Disch: anticipate discharge in 2-3 days pending surgical plan    History of Present Illness:  Crispin Rojas is a 62 year old male with PMH including type 2 diabetes mellitus, hypertension, hyperlipidemia, CVA, GILA, obesity, PVD who presents with right toe infection.    Patient reports that he had a right great toe amputation in early June.  He reports that he would never healed from this.  The scar did not heal fully.  Ultimately became infected.  The patient has been with severe pain for the past couple of weeks with this.  He thought it was just related to the infection, however he now understands its related fracture.  He was evaluated by his podiatrist as an outpatient today who completed an MRI and the MRI did show osteomyelitis with fracture, with abscess.  Patient denies any fevers.  He does endorse chills as well as severe fatigue.    ED work-up with normal vitals.  BMP is normal.  CBC is with a hemoglobin 11.7 otherwise normal.  CRP is 70.  Lactic acid is normal.  Glucose is 104.  Blood cultures are pending.  COVID-negative.  I was asked to admit him given osteomyelitis and abscess     Past Medical History:  Past Medical History:   Diagnosis Date     Cerebral infarction (H)     2010     Chronic infection      H/O blood clots 2022     Hyperlipidemia LDL goal <70      Hypertension      Numbness and tingling      Obesity      GILA (obstructive sleep apnea) 10/22/2018    CPAP intolerant     PVD (peripheral vascular disease) (H)     s/p left partial toe amputation     Renal stone      Tremor      Type " 2 diabetes mellitus with diabetic polyneuropathy, with long-term current use of insulin (H)      Past Surgical History:  Past Surgical History:   Procedure Laterality Date     AMPUTATE FOOT Left 8/18/2016    Procedure: AMPUTATE FOOT;  Surgeon: Jack Younger DPM;  Location: [facility] OR     AMPUTATE TOE(S) Right 2/1/2016    Procedure: AMPUTATE TOE(S);  Surgeon: Rachelle Manriquez DPM, Pod;  Location: [facility] OR     AMPUTATE TOE(S) Right 8/2/2019    Procedure: Right fourth toe partial amputation for treatment of osteomyelitis.;  Surgeon: Jack Younger DPM;  Location: [facility] OR     AMPUTATE TOE(S) Left 11/8/2019    Procedure: Left second toe amputation at the metatarsophalangeal joint.;  Surgeon: Rachelle Manriquez DPM, Podiatry/Foot and Ankle Surgery;  Location: [facility] OR     AMPUTATE TOE(S) Right 6/5/2022    Procedure: AMPUTATION OF RIGHT GREAT TOE;  Surgeon: Wili Quiles DPM;  Location: [facility] OR     ANGIOGRAM       COLONOSCOPY       COMBINED CYSTOSCOPY, RETROGRADES, URETEROSCOPY, INSERT STENT Left 8/21/2016    Procedure: COMBINED CYSTOSCOPY, RETROGRADES, URETEROSCOPY, INSERT STENT;  Surgeon: Artemio Valenzuela MD;  Location: [facility] OR     COMBINED CYSTOSCOPY, RETROGRADES, URETEROSCOPY, LASER HOLMIUM LITHOTRIPSY URETER(S), INSERT STENT Left 10/3/2016    Procedure: COMBINED CYSTOSCOPY, RETROGRADES, URETEROSCOPY, LASER HOLMIUM LITHOTRIPSY URETER(S), INSERT STENT;  Surgeon: Artemio Valenzuela MD;  Location: [facility] OR     EXTRACORPOREAL SHOCK WAVE LITHOTRIPSY (ESWL) Left 9/8/2016    Procedure: EXTRACORPOREAL SHOCK WAVE LITHOTRIPSY (ESWL);  Surgeon: Artemio Valenzuela MD;  Location: [facility] OR     RECESSION GASTROCNEMIUS Right 2/1/2016    Procedure: RECESSION GASTROCNEMIUS;  Surgeon: Rachelle Manriquez DPM, Pod;  Location: [facility] OR     Social History:  Social History     Tobacco Use     Smoking status: Never Smoker     Smokeless tobacco: Never Used   Substance Use Topics     Alcohol use: Yes     Alcohol/week: 0.0 standard drinks      Comment: rare---red wine 3x per month     Social History     Social History Narrative     Not on file     Family History:  Family History   Problem Relation Age of Onset     Arthritis Mother         SLE     Connective Tissue Disorder Mother         lupus     Diabetes Mother      Cerebrovascular Disease Mother      Cancer Mother      Diabetes Father      Diabetes Maternal Grandfather      Diabetes Sister      Allergies:  Allergies   Allergen Reactions     Bactrim [Sulfamethoxazole W/Trimethoprim] Nausea and Vomiting     Sulfa Drugs Nausea     Niacin Swelling     SIMCOR caused edema in extremities     Simvastatin Swelling     SIMCOR caused edema in extremities     Medications:  No current facility-administered medications on file prior to encounter.  amLODIPine (NORVASC) 5 MG tablet, Take 1 tablet (5 mg) by mouth 2 times daily Take one tablet twice daily  SEE MD THIS QUARTER  aspirin (ASA) 81 MG EC tablet, Take 1 tablet (81 mg) by mouth daily  atorvastatin (LIPITOR) 40 MG tablet, Take 1 tablet (40 mg) by mouth daily Take one tablet daily SEE PROVIDER FOR NEXT REFILL  blood glucose (NO BRAND SPECIFIED) lancets standard, Use to test blood sugar 3 times daily or as directed.  calcium carbonate (OS-NARA) 500 MG tablet, Take 1 tablet by mouth 2 times daily  Cholecalciferol (VITAMIN D3 PO), Take 1,000 Units by mouth daily   cholecalciferol 25 MCG (1000 UT) TABS, Take by mouth every 24 hours  Co-Enzyme Q10 100 MG CAPS, Take 100 mg by mouth daily   collagenase (SANTYL) 250 UNIT/GM external ointment, Apply topically daily  Continuous Blood Gluc Sensor (FREESTYLE LUCERO 14 DAY SENSOR) MISC, USE ONE EVERY 14 DAYS  ELIQUIS ANTICOAGULANT 5 MG tablet, Take 1 tablet (5 mg) by mouth 2 times daily  furosemide (LASIX) 20 MG tablet, Take 1 tablet (20 mg) by mouth daily as needed (for leg swelling) As needed for edema  gabapentin (NEURONTIN) 100 MG capsule, Take 400 mg by mouth 3 times daily  Glucagon, rDNA, (GLUCAGON EMERGENCY) 1 MG KIT,  Inject 1 mg into the muscle once for 1 dose  hydrochlorothiazide (MICROZIDE) 12.5 MG capsule, Take 1 capsule (12.5 mg) by mouth every morning  insulin aspart (NOVOLOG PEN) 100 UNIT/ML pen, Use daily with meals, up to 70 units a day (Patient taking differently: Inject 15-25 Units Subcutaneous 3 times daily (with meals) Use daily with meals, up to 70 units a day)  insulin degludec (TRESIBA FLEXTOUCH) 100 UNIT/ML pen, 45-55 units subcutaneous daily (Patient taking differently: Inject 55 Units Subcutaneous daily 45-55 units subcutaneous daily)  insulin pen needle (B-D U/F) 31G X 5 MM miscellaneous, USE TO INJECT LANTUS TWICE DAILY AND NOVOLOG THREE TIMES DAILY AS DIRECTED  ketoconazole (NIZORAL) 2 % external shampoo, APPLY TOPICALLY DAILY AS NEEDED FOR ITCHING OR IRRITATION. SEE MD AFTER 6 WEEKS  lisinopril (ZESTRIL) 40 MG tablet, Take 1 tablet (40 mg) by mouth daily  MAGNESIUM GLYCINATE PLUS PO, Take 400 mg by mouth 2 times daily  metFORMIN (GLUCOPHAGE XR) 500 MG 24 hr tablet, Take two tablets by mouth twice daily  Multiple Vitamins-Minerals (MULTIVITAMIN PO), Take 1 tablet by mouth daily   Omega-3 Fatty Acids (OMEGA-3 FISH OIL PO), Take 1 g by mouth 2 times daily (with meals)   omeprazole (PRILOSEC) 40 MG DR capsule, Take 40 mg by mouth daily  OZEMPIC, 1 MG/DOSE, 4 MG/3ML SOPN, INJECT 1 MG SUBCUTANEOUSLY AS DIRECTED EVERY 7 DAYS ON WEDNESDAYS  potassium chloride ER (KLOR-CON M) 10 MEQ CR tablet, Take 1 tablet (10 mEq) by mouth 2 times daily (Patient taking differently: Take 10 mEq by mouth 2 times daily as needed (takes when taking diuretic))  primidone (MYSOLINE) 50 MG tablet, TAKE 1 TABLET BY MOUTH THREE TIMES DAILY  tadalafil (CIALIS) 5 MG tablet, TAKE 1 TABLET BY MOUTH EVERY DAY AS NEEDED one hour before intercourse  [DISCONTINUED] insulin lispro (HUMALOG KWIKPEN) 100 UNIT/ML (1 unit dial) KWIKPEN, 1 units per 9 grams of carbohydrate before each meal + 1:25 over 150 correction.  TDD 70 units        Review of  Systems:  A Comprehensive greater than 10 system review of systems was carried out.  Pertinent positives and negatives are noted above.  Otherwise negative for contributory information.     Physical Exam:  Blood pressure 134/80, pulse 84, temperature 98.6  F (37  C), resp. rate 16, SpO2 98 %.  Wt Readings from Last 1 Encounters:   07/20/22 105.9 kg (233 lb 8 oz)     Exam:  General: Alert, awake, no acute distress.  HEENT: NC/AT, eyes anicteric, external occular movements intact, face symmetric.  Dentition WNL, MM moist.  Cardiac: RRR, S1, S2.  No murmurs appreciated.  Pulmonary: Normal chest rise, normal work of breathing.  Lungs CTA BL  Abdomen: soft, non-tender, non-distended.  Bowel Sounds Present.  No guarding.  Extremities: no deformities.  Warm, well perfused.  Right lower extremity is Ace wrapped with a splint in place.  Distal extremities are warm and well-perfused.  Skin: no rashes or lesions noted.  Warm and Dry.  Neuro: No focal deficits noted.  Speech clear.   Psych: Appropriate affect.    Data:  EKG: None  Imaging:  No results found for this or any previous visit (from the past 24 hour(s)).  Labs:  Recent Labs   Lab 07/22/22  1705   WBC 7.6   HGB 11.7*   HCT 38.1*   MCV 90             Lab Results   Component Value Date     07/22/2022     06/04/2022     06/03/2022     05/10/2021     04/20/2021     01/13/2021    Lab Results   Component Value Date    CHLORIDE 100 07/22/2022    CHLORIDE 105 06/04/2022    CHLORIDE 105 06/03/2022    CHLORIDE 108 05/10/2021    CHLORIDE 110 04/20/2021    CHLORIDE 106 01/13/2021    Lab Results   Component Value Date    BUN 20 07/22/2022    BUN 20 06/04/2022    BUN 33 06/03/2022    BUN 15 05/10/2021    BUN 22 04/20/2021    BUN 25 01/13/2021      Lab Results   Component Value Date    POTASSIUM 4.7 07/22/2022    POTASSIUM 4.0 06/04/2022    POTASSIUM 4.3 06/03/2022    POTASSIUM 3.6 05/10/2021    POTASSIUM 4.1 04/20/2021    POTASSIUM 4.3  01/13/2021    Lab Results   Component Value Date    CO2 27 07/22/2022    CO2 29 06/04/2022    CO2 29 06/03/2022    CO2 27 05/10/2021    CO2 27 04/20/2021    CO2 25 01/13/2021    Lab Results   Component Value Date    CR 1.02 07/22/2022    CR 1.01 06/08/2022    CR 1.03 06/07/2022    CR 0.86 05/10/2021    CR 0.92 04/20/2021    CR 1.01 01/13/2021        Albaro Sandoval DO  [facility]

## 2022-07-23 NOTE — CONSULTS
[facility]  CONSULTATION NOTE      ASSESSMENT:  62-year-old male with medical history of type 2 DM, peripheral neuropathy, toe amputations, HTN, hyperlipidemia, CVA. GILA, PVD who is admitted due to suspected osteomyelitis with pathologic fracture and abscess right foot, in the setting of right foot surgical wound dehiscence.    PLAN:   The radiographic findings were unexpected, given the relative stability of his foot clinically, with exception of a small area of wound dehiscence.  There have been no clinical signs of infection.    The MRI is suggesting extensive osteomyelitis throughout the entire first metatarsal with pathologic fracture and distal abscess.  Judie is at risk for partial or total first ray amputation.  He is at risk for progressing infection. He understands this.    Judie thinks there might be something more going on other than his foot, since he has felt fatigued since his last hospitalization.  He also asked if the MRI findings might be consistent with some form of cancer.    Given his overall systemic stability, as well as the clinical appearance of the foot, he elects to first proceed with bone biopsy, prior to deciding on amputation.  This is reasonable.  I did recommend we investigate the area of potential abscess, I&D.    Judie is scheduled for the OR tomorrow, later morning for 1) incision and drainage, right foot 2) bone biopsy right first metatarsal    N.p.o. post midnight    Unless there is rapid deterioration, we will plan to wait for bone biopsy results prior to any additional surgical intervention.  Should the findings be consistent with osteomyelitis, we will then offer partial or total first metatarsal excision.  We might consider preserving the base of the bone for foot stability and packing with antibiotic impregnated bone cement.  No guarantees are given.    The admission H&P states continuation of vancomycin and Zosyn.  I do not see this.  I reordered both  of these on a one-time occurrence and will consult infectious disease.    I appreciate internal medicine's willingness to assist with the overall medical management of this patient.    Valentino Molina DPM, FACFAS, MS  [facility] Department of Podiatry/Foot & Ankle Surgery  [phone]          _______________________________________________________________________________________________________________________________________________    CHIEF COMPLAINT:      I was asked by Albaro Sandoval DO to evaluate this patient, Crispin Rojas, for suspected right foot abscess and osteomyelitis.    PATIENT HISTORY:  Crispin Rojas is a 62-year-old male with medical history of type 2 DM, peripheral neuropathy, toe amputations, HTN, hyperlipidemia, CVA. GILA, PVD who is admitted due to suspected osteomyelitis and abscess, in the setting of right foot surgical wound dehiscence.     In early June he was admitted for osteomyelitis of his right hallux and underwent hallux amputation by Dr. Hendrix on 6/5/2022.  He has followed with me in clinic postoperatively.  For this we had him follow-up closely in clinic. On his second follow-up, 6/29/2022, he was found to have some wound dehiscence along the medial aspect of the incision.  Clinically, there were no signs of infection in these postoperative visits.      Last week, 7/20/2022, postoperative x-rays were taken.  The unexpectedly showed what is assumed to be a pathologic fracture of the first metatarsal neck.  A subsequent MRI is consistent with osteomyelitis throughout the right first metatarsal as well as distal abscess, yet clinically the area does not appear infected.  Judie does not remember any injury to his right foot.  He has profound peripheral neuropathy.    I recommended admission to the hospital for treatment.  He agreed with this.  Since I had not seen him within 24 hours, I was unable to have him admitted directly.  Therefore he  presented to the emergency  department yesterday and subsequently admitted.    He received Zosyn and vancomycin in the ED.  Of note, Pat says he has felt fatigued ever since his last hospital admission.  He also reports having increasing foot pain starting earlier in July.         Review of Systems:  A 10 point review of systems was performed and is positive for that noted above in the patient history.  All other areas are negative.     PAST MEDICAL HISTORY:   Past Medical History:   Diagnosis Date     Cerebral infarction (H)     2010     Chronic infection      H/O blood clots 2022     Hyperlipidemia LDL goal <70      Hypertension      Numbness and tingling      Obesity      GILA (obstructive sleep apnea) 10/22/2018    CPAP intolerant     PVD (peripheral vascular disease) (H)     s/p left partial toe amputation     Renal stone      Tremor      Type 2 diabetes mellitus with diabetic polyneuropathy, with long-term current use of insulin (H)         PAST SURGICAL HISTORY:   Past Surgical History:   Procedure Laterality Date     AMPUTATE FOOT Left 8/18/2016    Procedure: AMPUTATE FOOT;  Surgeon: Jack Younger DPM;  Location: [facility]     AMPUTATE TOE(S) Right 2/1/2016    Procedure: AMPUTATE TOE(S);  Surgeon: Rachelle Manriquez DPM, Pod;  Location: [facility]     AMPUTATE TOE(S) Right 8/2/2019    Procedure: Right fourth toe partial amputation for treatment of osteomyelitis.;  Surgeon: Jack Younger DPM;  Location: [facility]     AMPUTATE TOE(S) Left 11/8/2019    Procedure: Left second toe amputation at the metatarsophalangeal joint.;  Surgeon: Rachelle Manriquez DPM, Podiatry/Foot and Ankle Surgery;  Location: [facility]     AMPUTATE TOE(S) Right 6/5/2022    Procedure: AMPUTATION OF RIGHT GREAT TOE;  Surgeon: Wili Quiles DPM;  Location: [facility] OR     ANGIOGRAM       COLONOSCOPY       COMBINED CYSTOSCOPY, RETROGRADES, URETEROSCOPY, INSERT STENT Left 8/21/2016    Procedure: COMBINED CYSTOSCOPY, RETROGRADES, URETEROSCOPY, INSERT STENT;  Surgeon: Nicolas  Artemio Arce MD;  Location: [facility]     COMBINED CYSTOSCOPY, RETROGRADES, URETEROSCOPY, LASER HOLMIUM LITHOTRIPSY URETER(S), INSERT STENT Left 10/3/2016    Procedure: COMBINED CYSTOSCOPY, RETROGRADES, URETEROSCOPY, LASER HOLMIUM LITHOTRIPSY URETER(S), INSERT STENT;  Surgeon: Artemio Valenzuela MD;  Location: [facility]     EXTRACORPOREAL SHOCK WAVE LITHOTRIPSY (ESWL) Left 9/8/2016    Procedure: EXTRACORPOREAL SHOCK WAVE LITHOTRIPSY (ESWL);  Surgeon: Artemio Valenzuela MD;  Location: [facility] OR     RECESSION GASTROCNEMIUS Right 2/1/2016    Procedure: RECESSION GASTROCNEMIUS;  Surgeon: Rachelle Manriquez, DPM, Pod;  Location: [facility]        MEDICATIONS:  Reviewed in Epic.     ALLERGIES:    Allergies   Allergen Reactions     Bactrim [Sulfamethoxazole W/Trimethoprim] Nausea and Vomiting     Sulfa Drugs Nausea     Niacin Swelling     SIMCOR caused edema in extremities     Simvastatin Swelling     SIMCOR caused edema in extremities        SOCIAL HISTORY:   Social History     Socioeconomic History     Marital status:      Spouse name: Sydney     Number of children: 2     Years of education: Not on file     Highest education level: Master's degree (e.g., MA, MS, [place], MEd, MSW, ELIANA)   Occupational History     Occupation: marketing     Employer: [email or web address]     Comment: [employer]   Tobacco Use     Smoking status: Never Smoker     Smokeless tobacco: Never Used   Vaping Use     Vaping Use: Never used   Substance and Sexual Activity     Alcohol use: Yes     Alcohol/week: 0.0 standard drinks     Comment: rare---red wine 3x per month     Drug use: No     Sexual activity: Not Currently     Partners: Female   Other Topics Concern     Parent/sibling w/ CABG, MI or angioplasty before 65F 55M? No   Social History Narrative     Not on file     Social Determinants of Health     Financial Resource Strain: Low Risk      Difficulty of Paying Living Expenses: Not very hard   Food Insecurity: No Food Insecurity     Worried About Running Out  "of Food in the Last Year: Never true     Ran Out of Food in the Last Year: Never true   Transportation Needs: No Transportation Needs     Lack of Transportation (Medical): No     Lack of Transportation (Non-Medical): No   Physical Activity: Sufficiently Active     Days of Exercise per Week: 4 days     Minutes of Exercise per Session: 40 min   Stress: No Stress Concern Present     Feeling of Stress : Not at all   Social Connections: Moderately Isolated     Frequency of Communication with Friends and Family: Once a week     Frequency of Social Gatherings with Friends and Family: Never     Attends Quaker Services: More than 4 times per year     Active Member of Clubs or Organizations: No     Attends Club or Organization Meetings: Not on file     Marital Status:    Intimate Partner Violence: Not on file   Housing Stability: Low Risk      Unable to Pay for Housing in the Last Year: No     Number of Places Lived in the Last Year: 2     Unstable Housing in the Last Year: No        FAMILY HISTORY:   Family History   Problem Relation Age of Onset     Arthritis Mother         SLE     Connective Tissue Disorder Mother         lupus     Diabetes Mother      Cerebrovascular Disease Mother      Cancer Mother      Diabetes Father      Diabetes Maternal Grandfather      Diabetes Sister         EXAM:  Vitals: /61 (BP Location: Left arm)   Pulse 59   Temp 98.1  F (36.7  C) (Oral)   Resp 16   Ht 1.803 m (5' 11\")   Wt 105.7 kg (233 lb)   SpO2 95%   BMI 32.50 kg/m    BMI= Body mass index is 32.5 kg/m .    LABS:   Lab Results   Component Value Date    A1C 6.6 06/03/2022    A1C 6.1 01/03/2022    A1C 6.2 09/14/2021    A1C 6.5 06/22/2021    A1C 8.3 01/08/2021    A1C 6.0 09/09/2020    A1C 7.8 05/12/2020    A1C 8.8 02/04/2020       Lab Results   Component Value Date    INR 1.08 11/07/2019    INR 1.04 08/19/2013       Lab Results   Component Value Date    WBC 5.5 07/23/2022    WBC 6.5 05/10/2021     Lab Results "   Component Value Date    HGB 10.3 07/23/2022    HGB 12.1 05/10/2021     Lab Results   Component Value Date     07/23/2022     05/10/2021       All cultures:  Recent Labs   Lab 07/22/22  1929 07/22/22  1708   CULTURE No growth after 12 hours No growth after 12 hours     General appearance:    Crispin is alert and fully cooperative with history & exam.  No sign of distress is noted during the visit.      Psychiatric: Affect is pleasant & appropriate.  Crispin is motivated to improve health.       Respiratory: Breathing is regular & unlabored while sitting.      HEENT: Hearing is intact to spoken word.  Speech is clear.  No gross evidence of visual impairment that would impact ambulation.      Dermatologic: The wound on his right foot appears similar to my last evaluation on 7/20.  Please see the photograph below.  Today there is no surrounding erythema.  Mild edema.  The majority of the wound base is granular.  It does not probe to a hard endpoint.     Vascular: Dorsalis pedis and posterior tibial pulses are strongly palpable, right foot.      Neurologic: Lower extremity sensation is diminished, bilateral foot, to light touch.  No evidence of neurological-based weakness or contracture in the lower extremities.       Musculoskeletal: Patient is ambulatory without an assistive device or brace.  Digital contractures.  The right hallux has been amputated.  The left hallux and second toes are amputated.    IMAGING:   Impression:  Osteomyelitis of the entirety of the first metatarsal.  *  Pathologic fracture deformity of the distal first metatarsal,  similar to recent radiographs.  *  Rim-enhancing fluid collection compatible with abscess surrounding  the distal first metatarsal measuring up to 4.0 cm.     FLORA VU MD (Joe)

## 2022-07-24 ENCOUNTER — APPOINTMENT (OUTPATIENT)
Dept: GENERAL RADIOLOGY | Facility: CLINIC | Age: 62
End: 2022-07-24
Attending: PODIATRIST
Payer: COMMERCIAL

## 2022-07-24 ENCOUNTER — APPOINTMENT (OUTPATIENT)
Dept: GENERAL RADIOLOGY | Facility: CLINIC | Age: 62
End: 2022-07-24
Attending: STUDENT IN AN ORGANIZED HEALTH CARE EDUCATION/TRAINING PROGRAM
Payer: COMMERCIAL

## 2022-07-24 ENCOUNTER — ANESTHESIA EVENT (OUTPATIENT)
Dept: SURGERY | Facility: CLINIC | Age: 62
End: 2022-07-24
Payer: COMMERCIAL

## 2022-07-24 ENCOUNTER — ANESTHESIA (OUTPATIENT)
Dept: SURGERY | Facility: CLINIC | Age: 62
End: 2022-07-24
Payer: COMMERCIAL

## 2022-07-24 LAB
ANION GAP SERPL CALCULATED.3IONS-SCNC: 7 MMOL/L (ref 3–14)
BUN SERPL-MCNC: 16 MG/DL (ref 7–30)
CALCIUM SERPL-MCNC: 8.9 MG/DL (ref 8.5–10.1)
CHLORIDE BLD-SCNC: 106 MMOL/L (ref 94–109)
CO2 SERPL-SCNC: 26 MMOL/L (ref 20–32)
CREAT SERPL-MCNC: 1.05 MG/DL (ref 0.66–1.25)
CREAT SERPL-MCNC: 1.06 MG/DL (ref 0.66–1.25)
ERYTHROCYTE [DISTWIDTH] IN BLOOD BY AUTOMATED COUNT: 13.2 % (ref 10–15)
GFR SERPL CREATININE-BSD FRML MDRD: 80 ML/MIN/1.73M2
GFR SERPL CREATININE-BSD FRML MDRD: 81 ML/MIN/1.73M2
GLUCOSE BLD-MCNC: 90 MG/DL (ref 70–99)
GLUCOSE BLDC GLUCOMTR-MCNC: 105 MG/DL (ref 70–99)
GLUCOSE BLDC GLUCOMTR-MCNC: 107 MG/DL (ref 70–99)
GLUCOSE BLDC GLUCOMTR-MCNC: 170 MG/DL (ref 70–99)
GLUCOSE BLDC GLUCOMTR-MCNC: 82 MG/DL (ref 70–99)
GLUCOSE BLDC GLUCOMTR-MCNC: 88 MG/DL (ref 70–99)
GLUCOSE BLDC GLUCOMTR-MCNC: 99 MG/DL (ref 70–99)
GRAM STAIN RESULT: NORMAL
GRAM STAIN RESULT: NORMAL
HCT VFR BLD AUTO: 36.8 % (ref 40–53)
HGB BLD-MCNC: 11.4 G/DL (ref 13.3–17.7)
MCH RBC QN AUTO: 27.7 PG (ref 26.5–33)
MCHC RBC AUTO-ENTMCNC: 31 G/DL (ref 31.5–36.5)
MCV RBC AUTO: 90 FL (ref 78–100)
PLATELET # BLD AUTO: 293 10E3/UL (ref 150–450)
POTASSIUM BLD-SCNC: 3.9 MMOL/L (ref 3.4–5.3)
RBC # BLD AUTO: 4.11 10E6/UL (ref 4.4–5.9)
SODIUM SERPL-SCNC: 139 MMOL/L (ref 133–144)
VANCOMYCIN SERPL-MCNC: 12.8 MG/L
WBC # BLD AUTO: 4.7 10E3/UL (ref 4–11)

## 2022-07-24 PROCEDURE — 999N000141 HC STATISTIC PRE-PROCEDURE NURSING ASSESSMENT: Performed by: PODIATRIST

## 2022-07-24 PROCEDURE — 88307 TISSUE EXAM BY PATHOLOGIST: CPT | Mod: TC | Performed by: PODIATRIST

## 2022-07-24 PROCEDURE — 258N000003 HC RX IP 258 OP 636: Performed by: NURSE ANESTHETIST, CERTIFIED REGISTERED

## 2022-07-24 PROCEDURE — 80048 BASIC METABOLIC PNL TOTAL CA: CPT | Performed by: STUDENT IN AN ORGANIZED HEALTH CARE EDUCATION/TRAINING PROGRAM

## 2022-07-24 PROCEDURE — 85027 COMPLETE CBC AUTOMATED: CPT | Performed by: STUDENT IN AN ORGANIZED HEALTH CARE EDUCATION/TRAINING PROGRAM

## 2022-07-24 PROCEDURE — 370N000017 HC ANESTHESIA TECHNICAL FEE, PER MIN: Performed by: PODIATRIST

## 2022-07-24 PROCEDURE — 258N000003 HC RX IP 258 OP 636: Performed by: STUDENT IN AN ORGANIZED HEALTH CARE EDUCATION/TRAINING PROGRAM

## 2022-07-24 PROCEDURE — 360N000082 HC SURGERY LEVEL 2 W/ FLUORO, PER MIN: Performed by: PODIATRIST

## 2022-07-24 PROCEDURE — 88311 DECALCIFY TISSUE: CPT | Mod: 26 | Performed by: PATHOLOGY

## 2022-07-24 PROCEDURE — 82310 ASSAY OF CALCIUM: CPT | Performed by: STUDENT IN AN ORGANIZED HEALTH CARE EDUCATION/TRAINING PROGRAM

## 2022-07-24 PROCEDURE — 250N000011 HC RX IP 250 OP 636: Performed by: PODIATRIST

## 2022-07-24 PROCEDURE — 87205 SMEAR GRAM STAIN: CPT | Performed by: PODIATRIST

## 2022-07-24 PROCEDURE — 87075 CULTR BACTERIA EXCEPT BLOOD: CPT | Performed by: PODIATRIST

## 2022-07-24 PROCEDURE — 258N000003 HC RX IP 258 OP 636: Performed by: PODIATRIST

## 2022-07-24 PROCEDURE — 120N000001 HC R&B MED SURG/OB

## 2022-07-24 PROCEDURE — 80202 ASSAY OF VANCOMYCIN: CPT | Performed by: STUDENT IN AN ORGANIZED HEALTH CARE EDUCATION/TRAINING PROGRAM

## 2022-07-24 PROCEDURE — 36415 COLL VENOUS BLD VENIPUNCTURE: CPT | Performed by: HOSPITALIST

## 2022-07-24 PROCEDURE — 250N000009 HC RX 250: Performed by: NURSE ANESTHETIST, CERTIFIED REGISTERED

## 2022-07-24 PROCEDURE — 20240 BONE BIOPSY OPEN SUPERFICIAL: CPT | Mod: 78 | Performed by: PODIATRIST

## 2022-07-24 PROCEDURE — 88307 TISSUE EXAM BY PATHOLOGIST: CPT | Mod: 26 | Performed by: PATHOLOGY

## 2022-07-24 PROCEDURE — 0Y9M0ZZ DRAINAGE OF RIGHT FOOT, OPEN APPROACH: ICD-10-PCS | Performed by: PODIATRIST

## 2022-07-24 PROCEDURE — 250N000011 HC RX IP 250 OP 636: Performed by: STUDENT IN AN ORGANIZED HEALTH CARE EDUCATION/TRAINING PROGRAM

## 2022-07-24 PROCEDURE — 272N000001 HC OR GENERAL SUPPLY STERILE: Performed by: PODIATRIST

## 2022-07-24 PROCEDURE — 87040 BLOOD CULTURE FOR BACTERIA: CPT | Performed by: HOSPITALIST

## 2022-07-24 PROCEDURE — 28002 TREATMENT OF FOOT INFECTION: CPT | Mod: 78 | Performed by: PODIATRIST

## 2022-07-24 PROCEDURE — 710N000009 HC RECOVERY PHASE 1, LEVEL 1, PER MIN: Performed by: PODIATRIST

## 2022-07-24 PROCEDURE — 250N000011 HC RX IP 250 OP 636: Performed by: NURSE ANESTHETIST, CERTIFIED REGISTERED

## 2022-07-24 PROCEDURE — 250N000009 HC RX 250: Performed by: PODIATRIST

## 2022-07-24 PROCEDURE — 87077 CULTURE AEROBIC IDENTIFY: CPT | Performed by: PODIATRIST

## 2022-07-24 PROCEDURE — 999N000179 XR SURGERY CARM FLUORO LESS THAN 5 MIN W STILLS: Mod: TC

## 2022-07-24 PROCEDURE — 999N000065 XR FOOT PORT RIGHT 3 VIEWS: Mod: RT

## 2022-07-24 PROCEDURE — 99232 SBSQ HOSP IP/OBS MODERATE 35: CPT | Performed by: STUDENT IN AN ORGANIZED HEALTH CARE EDUCATION/TRAINING PROGRAM

## 2022-07-24 PROCEDURE — 36415 COLL VENOUS BLD VENIPUNCTURE: CPT | Performed by: STUDENT IN AN ORGANIZED HEALTH CARE EDUCATION/TRAINING PROGRAM

## 2022-07-24 PROCEDURE — 0QBN0ZX EXCISION OF RIGHT METATARSAL, OPEN APPROACH, DIAGNOSTIC: ICD-10-PCS | Performed by: PODIATRIST

## 2022-07-24 RX ORDER — OXYCODONE HYDROCHLORIDE 5 MG/1
5 TABLET ORAL EVERY 4 HOURS PRN
Status: DISCONTINUED | OUTPATIENT
Start: 2022-07-24 | End: 2022-07-24

## 2022-07-24 RX ORDER — BUPIVACAINE HYDROCHLORIDE 5 MG/ML
INJECTION, SOLUTION EPIDURAL; INTRACAUDAL PRN
Status: DISCONTINUED | OUTPATIENT
Start: 2022-07-24 | End: 2022-07-24 | Stop reason: HOSPADM

## 2022-07-24 RX ORDER — LIDOCAINE HYDROCHLORIDE 10 MG/ML
INJECTION, SOLUTION INFILTRATION; PERINEURAL PRN
Status: DISCONTINUED | OUTPATIENT
Start: 2022-07-24 | End: 2022-07-24

## 2022-07-24 RX ORDER — NALOXONE HYDROCHLORIDE 0.4 MG/ML
0.2 INJECTION, SOLUTION INTRAMUSCULAR; INTRAVENOUS; SUBCUTANEOUS
Status: DISCONTINUED | OUTPATIENT
Start: 2022-07-24 | End: 2022-08-01 | Stop reason: HOSPADM

## 2022-07-24 RX ORDER — DEXAMETHASONE SODIUM PHOSPHATE 4 MG/ML
4 INJECTION, SOLUTION INTRA-ARTICULAR; INTRALESIONAL; INTRAMUSCULAR; INTRAVENOUS; SOFT TISSUE
Status: DISCONTINUED | OUTPATIENT
Start: 2022-07-24 | End: 2022-07-24

## 2022-07-24 RX ORDER — SODIUM CHLORIDE, SODIUM LACTATE, POTASSIUM CHLORIDE, CALCIUM CHLORIDE 600; 310; 30; 20 MG/100ML; MG/100ML; MG/100ML; MG/100ML
INJECTION, SOLUTION INTRAVENOUS CONTINUOUS PRN
Status: DISCONTINUED | OUTPATIENT
Start: 2022-07-24 | End: 2022-07-24

## 2022-07-24 RX ORDER — FENTANYL CITRATE 50 UG/ML
INJECTION, SOLUTION INTRAMUSCULAR; INTRAVENOUS PRN
Status: DISCONTINUED | OUTPATIENT
Start: 2022-07-24 | End: 2022-07-24

## 2022-07-24 RX ORDER — NALOXONE HYDROCHLORIDE 0.4 MG/ML
0.4 INJECTION, SOLUTION INTRAMUSCULAR; INTRAVENOUS; SUBCUTANEOUS
Status: DISCONTINUED | OUTPATIENT
Start: 2022-07-24 | End: 2022-08-01 | Stop reason: HOSPADM

## 2022-07-24 RX ORDER — ONDANSETRON 2 MG/ML
INJECTION INTRAMUSCULAR; INTRAVENOUS PRN
Status: DISCONTINUED | OUTPATIENT
Start: 2022-07-24 | End: 2022-07-24

## 2022-07-24 RX ORDER — PROPOFOL 10 MG/ML
INJECTION, EMULSION INTRAVENOUS CONTINUOUS PRN
Status: DISCONTINUED | OUTPATIENT
Start: 2022-07-24 | End: 2022-07-24

## 2022-07-24 RX ORDER — ONDANSETRON 4 MG/1
4 TABLET, ORALLY DISINTEGRATING ORAL EVERY 30 MIN PRN
Status: DISCONTINUED | OUTPATIENT
Start: 2022-07-24 | End: 2022-07-24

## 2022-07-24 RX ORDER — HYDROMORPHONE HCL IN WATER/PF 6 MG/30 ML
0.4 PATIENT CONTROLLED ANALGESIA SYRINGE INTRAVENOUS EVERY 5 MIN PRN
Status: DISCONTINUED | OUTPATIENT
Start: 2022-07-24 | End: 2022-07-24

## 2022-07-24 RX ORDER — FENTANYL CITRATE 50 UG/ML
50 INJECTION, SOLUTION INTRAMUSCULAR; INTRAVENOUS EVERY 5 MIN PRN
Status: DISCONTINUED | OUTPATIENT
Start: 2022-07-24 | End: 2022-07-24

## 2022-07-24 RX ORDER — ONDANSETRON 2 MG/ML
4 INJECTION INTRAMUSCULAR; INTRAVENOUS EVERY 30 MIN PRN
Status: DISCONTINUED | OUTPATIENT
Start: 2022-07-24 | End: 2022-07-24

## 2022-07-24 RX ORDER — MAGNESIUM HYDROXIDE 1200 MG/15ML
LIQUID ORAL PRN
Status: DISCONTINUED | OUTPATIENT
Start: 2022-07-24 | End: 2022-07-24

## 2022-07-24 RX ORDER — OXYCODONE HYDROCHLORIDE 5 MG/1
5 TABLET ORAL EVERY 4 HOURS PRN
Status: DISCONTINUED | OUTPATIENT
Start: 2022-07-24 | End: 2022-08-01 | Stop reason: HOSPADM

## 2022-07-24 RX ORDER — MEPERIDINE HYDROCHLORIDE 25 MG/ML
12.5 INJECTION INTRAMUSCULAR; INTRAVENOUS; SUBCUTANEOUS EVERY 5 MIN PRN
Status: DISCONTINUED | OUTPATIENT
Start: 2022-07-24 | End: 2022-07-24

## 2022-07-24 RX ORDER — SODIUM CHLORIDE, SODIUM LACTATE, POTASSIUM CHLORIDE, CALCIUM CHLORIDE 600; 310; 30; 20 MG/100ML; MG/100ML; MG/100ML; MG/100ML
INJECTION, SOLUTION INTRAVENOUS CONTINUOUS
Status: DISCONTINUED | OUTPATIENT
Start: 2022-07-24 | End: 2022-07-24

## 2022-07-24 RX ORDER — DIAZEPAM 10 MG/2ML
2.5 INJECTION, SOLUTION INTRAMUSCULAR; INTRAVENOUS
Status: DISCONTINUED | OUTPATIENT
Start: 2022-07-24 | End: 2022-07-24

## 2022-07-24 RX ORDER — ALBUTEROL SULFATE 0.83 MG/ML
2.5 SOLUTION RESPIRATORY (INHALATION) EVERY 4 HOURS PRN
Status: DISCONTINUED | OUTPATIENT
Start: 2022-07-24 | End: 2022-07-24

## 2022-07-24 RX ORDER — LABETALOL HYDROCHLORIDE 5 MG/ML
10 INJECTION, SOLUTION INTRAVENOUS EVERY 10 MIN PRN
Status: DISCONTINUED | OUTPATIENT
Start: 2022-07-24 | End: 2022-07-24

## 2022-07-24 RX ADMIN — TAZOBACTAM SODIUM AND PIPERACILLIN SODIUM 4.5 G: 500; 4 INJECTION, SOLUTION INTRAVENOUS at 14:44

## 2022-07-24 RX ADMIN — FENTANYL CITRATE 25 MCG: 50 INJECTION, SOLUTION INTRAMUSCULAR; INTRAVENOUS at 11:02

## 2022-07-24 RX ADMIN — SODIUM CHLORIDE, POTASSIUM CHLORIDE, SODIUM LACTATE AND CALCIUM CHLORIDE: 600; 310; 30; 20 INJECTION, SOLUTION INTRAVENOUS at 10:54

## 2022-07-24 RX ADMIN — VANCOMYCIN HYDROCHLORIDE 1250 MG: 10 INJECTION, POWDER, LYOPHILIZED, FOR SOLUTION INTRAVENOUS at 17:31

## 2022-07-24 RX ADMIN — TAZOBACTAM SODIUM AND PIPERACILLIN SODIUM 4.5 G: 500; 4 INJECTION, SOLUTION INTRAVENOUS at 02:15

## 2022-07-24 RX ADMIN — TAZOBACTAM SODIUM AND PIPERACILLIN SODIUM 4.5 G: 500; 4 INJECTION, SOLUTION INTRAVENOUS at 19:46

## 2022-07-24 RX ADMIN — MIDAZOLAM 2 MG: 1 INJECTION INTRAMUSCULAR; INTRAVENOUS at 10:56

## 2022-07-24 RX ADMIN — VANCOMYCIN HYDROCHLORIDE 1250 MG: 10 INJECTION, POWDER, LYOPHILIZED, FOR SOLUTION INTRAVENOUS at 04:51

## 2022-07-24 RX ADMIN — TAZOBACTAM SODIUM AND PIPERACILLIN SODIUM 4.5 G: 500; 4 INJECTION, SOLUTION INTRAVENOUS at 08:37

## 2022-07-24 RX ADMIN — LIDOCAINE HYDROCHLORIDE 20 MG: 10 INJECTION, SOLUTION INFILTRATION; PERINEURAL at 10:59

## 2022-07-24 RX ADMIN — ONDANSETRON HYDROCHLORIDE 4 MG: 2 INJECTION, SOLUTION INTRAVENOUS at 11:06

## 2022-07-24 RX ADMIN — PROPOFOL 125 MCG/KG/MIN: 10 INJECTION, EMULSION INTRAVENOUS at 10:59

## 2022-07-24 ASSESSMENT — ACTIVITIES OF DAILY LIVING (ADL)
ADLS_ACUITY_SCORE: 22

## 2022-07-24 NOTE — ANESTHESIA CARE TRANSFER NOTE
Patient: Crispin Rojas    Procedure: Procedure(s):  INCISION AND DRAINAGE, FOOT  BIOPSY, BONE, FOOT       Diagnosis: Acute osteomyelitis of right foot (H) [M86.171]  Wound dehiscence [T81.30XA]  Abscess of foot [L02.619]  Diagnosis Additional Information: No value filed.    Anesthesia Type:   MAC     Note:    Oropharynx: oropharynx clear of all foreign objects and spontaneously breathing  Level of Consciousness: awake  Oxygen Supplementation: room air    Independent Airway: airway patency satisfactory and stable  Dentition: dentition unchanged  Vital Signs Stable: post-procedure vital signs reviewed and stable  Report to RN Given: handoff report given  Patient transferred to: PACU    Handoff Report: Identifed the Patient, Identified the Reponsible Provider, Reviewed the pertinent medical history, Discussed the surgical course, Reviewed Intra-OP anesthesia mangement and issues during anesthesia, Set expectations for post-procedure period and Allowed opportunity for questions and acknowledgement of understanding      Vitals:  Vitals Value Taken Time   /65 07/24/22 1142   Temp 97.3  F (36.3  C) 07/24/22 1141   Pulse 54 07/24/22 1142   Resp 10 07/24/22 1142   SpO2 94 % 07/24/22 1142       Electronically Signed By: SARAH Moore CRNA  July 24, 2022  11:43 AM

## 2022-07-24 NOTE — PROVIDER NOTIFICATION
Positive staph aureus MD paged     Ordered repeat blood cultures. Continue vanc and zosyn.    Johnnie Valenzuela MD

## 2022-07-24 NOTE — PROGRESS NOTES
Brief medicine Note    - Notified by nursing that BC showed gram negative. Currently is on Zosyn and Vanco, will continue ABX regiment.     SARAH Burgos CNP  07/23/22

## 2022-07-24 NOTE — PLAN OF CARE
PRIMARY DIAGNOSIS: OSTEOMYELITIS OF RT FOOT  OUTPATIENT/OBSERVATION GOALS TO BE MET BEFORE DISCHARGE:  1. Pain Status: Pain free.    2. Return to near baseline physical activity: Yes    3. Cleared for discharge by consultants (if involved): No    Discharge Planner Nurse   Safe discharge environment identified: Yes  Barriers to discharge: Yes       Entered by: Grace Ferrell RN 07/24/2022 6:17 AM     Patient is Aox4, VSS, NPO at midnight due to surgical procedure, I&D scheduled for this morning, independent in the room, IV in the LT arm is saline locked, IV Zosyn and Vanco administered, compression devices are on both LE's, two critical labs paged out for positive blood cultures, sleeping well, able to make needs known, will continue to monitor and provide cares.      Please review provider order for any additional goals.   Nurse to notify provider when observation goals have been met and patient is ready for discharge.Goal Outcome Evaluation:

## 2022-07-24 NOTE — PHARMACY-VANCOMYCIN DOSING SERVICE
"Pharmacy Vancomycin Note  Date of Service 2022  Patient's  1960   62 year old, male    Indication: Osteomyelitis  Day of Therapy: 3  Current vancomycin regimen:  1250 mg IV q12h  Current vancomycin monitoring method: AUC  Current vancomycin therapeutic monitoring goal: 400-600 mg*h/L    InsightRX Prediction of Current Vancomycin Regimen  Loading dose: 2500 mg x1  Regimen: 1250 mg IV every 12 hours.  Start time: 18:13 on 2022  Exposure target: AUC24 (range)400-600 mg/L.hr   AUC24,ss: 524 mg/L.hr  Probability of AUC24 > 400: 93 %  Ctrough,ss: 16.9 mg/L  Probability of Ctrough,ss > 20: 26 %  Probability of nephrotoxicity (Lodise LYNN ): 13 %      Current estimated CrCl = Estimated Creatinine Clearance: 91.7 mL/min (based on SCr of 1.06 mg/dL).    Creatinine for last 3 days  2022:  5:05 PM Creatinine 1.02 mg/dL  2022:  5:44 AM Creatinine 0.94 mg/dL  2022:  6:00 AM Creatinine 1.06 mg/dL;  6:00 AM Creatinine 1.05 mg/dL    Recent Vancomycin Levels (past 3 days)  2022:  3:49 PM Vancomycin 12.8 mg/L    Vancomycin IV Administrations (past 72 hours)                   vancomycin 1250 mg in 0.9% NaCl 250 mL intermittent infusion 1,250 mg (mg) 1,250 mg New Bag 22 0451     1,250 mg New Bag 22 1547    vancomycin (VANCOCIN) 2,500 mg in sodium chloride 0.9 % 500 mL intermittent infusion (mg) 2,500 mg New Bag 22                Nephrotoxins and other renal medications (From now, onward)    Start     Dose/Rate Route Frequency Ordered Stop    22  piperacillin-tazobactam (ZOSYN) intermittent infusion 4.5 g        Note to Pharmacy: For [facility], [facility] and [facility]: For Zosyn-naive patients, use the \"Zosyn initial dose + extended infusion\" order panel.    4.5 g  200 mL/hr over 30 Minutes Intravenous EVERY 6 HOURS 22 1654      22 1430  vancomycin 1250 mg in 0.9% NaCl 250 mL intermittent infusion 1,250 mg         1,250 mg  over 90 Minutes Intravenous EVERY 12 " HOURS 07/23/22 1404               Contrast Orders - past 72 hours (72h ago, onward)    None          Interpretation of levels and current regimen:  Vancomycin level is reflective of -600    Has serum creatinine changed greater than 50% in last 72 hours: No    Urine output:  good urine output    Renal Function: Stable    InsightRX Prediction of Planned New Vancomycin Regimen  Loading dose:2500 mg x1  Regimen: 1250 mg IV every 12 hours.  Start time: 18:13 on 07/24/2022  Exposure target: AUC24 (range)400-600 mg/L.hr   AUC24,ss: 524 mg/L.hr  Probability of AUC24 > 400: 93 %  Ctrough,ss: 16.9 mg/L  Probability of Ctrough,ss > 20: 26 %  Probability of nephrotoxicity (Lodise LYNN 2009): 13 %      Plan:  1. Continue Current Dose  2. Vancomycin monitoring method: AUC  3. Vancomycin therapeutic monitoring goal: 400-600 mg*h/L  4. Pharmacy will check vancomycin levels as appropriate in 1-3 Days.  5. Serum creatinine levels will be ordered daily for the first week of therapy and at least twice weekly for subsequent weeks.    Jaime Sylvester, [facility]

## 2022-07-24 NOTE — PLAN OF CARE
A&Ox4, on bedrest with bathroom privileges at this time, tolerating regular diet, heart sounds- WNL, lungs- clear, bowels active, voided post surgery, bone biopsy done today, dressing CDI, denies tingling, reports baseline numbness in feet, continuing with IV Zosyn and Vanco. Podiatry following. ID consult. Wife at the bedside. Echo ordered.

## 2022-07-24 NOTE — ANESTHESIA PREPROCEDURE EVALUATION
Anesthesia Pre-Procedure Evaluation    Patient: Crispin Rojas   MRN: [id number] : 1960        Procedure : Procedure(s):  INCISION AND DRAINAGE, FOOT  BIOPSY, BONE, FOOT          Past Medical History:   Diagnosis Date     Cerebral infarction (H)          Chronic infection      H/O blood clots      Hyperlipidemia LDL goal <70      Hypertension      Numbness and tingling      Obesity      GILA (obstructive sleep apnea) 10/22/2018    CPAP intolerant     PVD (peripheral vascular disease) (H)     s/p left partial toe amputation     Renal stone      Tremor      Type 2 diabetes mellitus with diabetic polyneuropathy, with long-term current use of insulin (H)       Past Surgical History:   Procedure Laterality Date     AMPUTATE FOOT Left 2016    Procedure: AMPUTATE FOOT;  Surgeon: Jack Younger DPM;  [facility]     AMPUTATE TOE(S) Right 2016    Procedure: AMPUTATE TOE(S);  Surgeon: Rachelle Manriquez DPM, Pod;  [facility]: [facility]     AMPUTATE TOE(S) Right 2019    Procedure: Right fourth toe partial amputation for treatment of osteomyelitis.;  Surgeon: Jack Younger DPM;  Location: [facility]     AMPUTATE TOE(S) Left 2019    Procedure: Left second toe amputation at the metatarsophalangeal joint.;  Surgeon: Rachelle Manriquez DPM, Podiatry/Foot and Ankle Surgery;  Location: [facility]     AMPUTATE TOE(S) Right 2022    Procedure: AMPUTATION OF RIGHT GREAT TOE;  Surgeon: Wili Quiles DPM;  Location: [facility]     ANGIOGRAM       COLONOSCOPY       COMBINED CYSTOSCOPY, RETROGRADES, URETEROSCOPY, INSERT STENT Left 2016    Procedure: COMBINED CYSTOSCOPY, RETROGRADES, URETEROSCOPY, INSERT STENT;  Surgeon: Artemio Valenzuela MD;  Location: [facility]     COMBINED CYSTOSCOPY, RETROGRADES, URETEROSCOPY, LASER HOLMIUM LITHOTRIPSY URETER(S), INSERT STENT Left 10/3/2016    Procedure: COMBINED CYSTOSCOPY, RETROGRADES, URETEROSCOPY, LASER HOLMIUM LITHOTRIPSY URETER(S), INSERT STENT;  Surgeon:  Artemio Valenzuela MD;  Location: [facility]     EXTRACORPOREAL SHOCK WAVE LITHOTRIPSY (ESWL) Left 9/8/2016    Procedure: EXTRACORPOREAL SHOCK WAVE LITHOTRIPSY (ESWL);  Surgeon: Artemio Valenzuela MD;  Location: [facility]     RECESSION GASTROCNEMIUS Right 2/1/2016    Procedure: RECESSION GASTROCNEMIUS;  Surgeon: Rachelle Manriquez, DPM, Pod;  Location: [facility]      Allergies   Allergen Reactions     Bactrim [Sulfamethoxazole W/Trimethoprim] Nausea and Vomiting     Sulfa Drugs Nausea     Niacin Swelling     SIMCOR caused edema in extremities     Simvastatin Swelling     SIMCOR caused edema in extremities      Social History     Tobacco Use     Smoking status: Never Smoker     Smokeless tobacco: Never Used   Substance Use Topics     Alcohol use: Yes     Alcohol/week: 0.0 standard drinks     Comment: rare---red wine 3x per month      Wt Readings from Last 1 Encounters:   07/22/22 105.7 kg (233 lb)        Anesthesia Evaluation   Pt has had prior anesthetic. Type: General.    No history of anesthetic complications       ROS/MED HX  ENT/Pulmonary:  - neg pulmonary ROS   (+) sleep apnea, doesn't use CPAP,     Neurologic: Comment: tremor    (+) CVA,     Cardiovascular:     (+) hypertension-----    METS/Exercise Tolerance:     Hematologic:  - neg hematologic  ROS     Musculoskeletal: Comment: Foot wound      GI/Hepatic: Comment: cirrhosis    (+) liver disease,     Renal/Genitourinary:       Endo:     (+) type II DM, Diabetic complications: neuropathy. Obesity,     Psychiatric/Substance Use:  - neg psychiatric ROS     Infectious Disease:  - neg infectious disease ROS     Malignancy:  - neg malignancy ROS     Other:  - neg other ROS          Physical Exam    Airway        Mallampati: III   TM distance: > 3 FB   Neck ROM: full   Mouth opening: > 3 cm    Respiratory Devices and Support         Dental  no notable dental history         Cardiovascular   cardiovascular exam normal          Pulmonary   pulmonary exam normal                 OUTSIDE LABS:  CBC:   Lab Results   Component Value Date    WBC 4.7 07/24/2022    WBC 5.5 07/23/2022    HGB 11.4 (L) 07/24/2022    HGB 10.3 (L) 07/23/2022    HCT 36.8 (L) 07/24/2022    HCT 33.1 (L) 07/23/2022     07/24/2022     07/23/2022     BMP:   Lab Results   Component Value Date     07/24/2022     07/23/2022    POTASSIUM 3.9 07/24/2022    POTASSIUM 4.1 07/23/2022    CHLORIDE 106 07/24/2022    CHLORIDE 104 07/23/2022    CO2 26 07/24/2022    CO2 27 07/23/2022    BUN 16 07/24/2022    BUN 17 07/23/2022    CR 1.06 07/24/2022    CR 1.05 07/24/2022     (H) 07/24/2022    GLC 88 07/24/2022     COAGS:   Lab Results   Component Value Date    INR 1.08 11/07/2019     POC:   Lab Results   Component Value Date     (H) 05/10/2021     HEPATIC:   Lab Results   Component Value Date    ALBUMIN 3.6 05/10/2021    PROTTOTAL 7.4 05/10/2021    ALT 56 05/10/2021    AST 38 05/10/2021    ALKPHOS 54 05/10/2021    BILITOTAL 0.5 05/10/2021     OTHER:   Lab Results   Component Value Date    LACT 1.7 07/22/2022    A1C 6.6 (H) 06/03/2022    NARA 8.9 07/24/2022    MAG 2.1 08/02/2019    LIPASE 156 07/22/2022    TSH 1.18 07/23/2022    CRP 70.9 (H) 07/22/2022    SED 95 (H) 07/22/2022       Anesthesia Plan    ASA Status:  4   NPO Status:  NPO Appropriate    Anesthesia Type: MAC.              Consents    Anesthesia Plan(s) and associated risks, benefits, and realistic alternatives discussed. Questions answered and patient/representative(s) expressed understanding.    - Discussed:     - Discussed with:  Patient         Postoperative Care    Pain management: IV analgesics, Oral pain medications, Multi-modal analgesia.   PONV prophylaxis: Ondansetron (or other 5HT-3), Dexamethasone or Solumedrol     Comments:                Joseph Lopez MD

## 2022-07-24 NOTE — ANESTHESIA POSTPROCEDURE EVALUATION
Patient: Crispin Rojas    Procedure: Procedure(s):  INCISION AND DRAINAGE, FOOT  BIOPSY, BONE, FOOT       Anesthesia Type:  MAC    Note:     Postop Pain Control: Uneventful            Sign Out: Well controlled pain   PONV: No   Neuro/Psych: Uneventful            Sign Out: Acceptable/Baseline neuro status   Airway/Respiratory: Uneventful            Sign Out: Acceptable/Baseline resp. status   CV/Hemodynamics: Uneventful            Sign Out: Acceptable CV status   Other NRE: NONE   DID A NON-ROUTINE EVENT OCCUR?            Last vitals:  Vitals Value Taken Time   /69 07/24/22 1200   Temp 97.3  F (36.3  C) 07/24/22 1200   Pulse 44 07/24/22 1209   Resp 13 07/24/22 1209   SpO2 100 % 07/24/22 1209   Vitals shown include unvalidated device data.    Electronically Signed By: Joseph Lopez MD  July 24, 2022  12:33 PM

## 2022-07-24 NOTE — OP NOTE
Procedure Date: 07/24/2022    SURGEON:  Valentino Molina DPM.    PREOPERATIVE DIAGNOSES:    1.  Wound dehiscence/ open wound, right foot.  2.  Pathologic fracture, right first metatarsal.  3.  Concern for osteomyelitis and abscess, right foot.    POSTOPERATIVE DIAGNOSES:    1.  Wound dehiscence/open wound, right foot.  2.  Pathologic fracture, right first metatarsal.  3.  Concern for osteomyelitis and abscess, right foot.    PROCEDURES:    1.  Incision and drainage, right foot.  2.  Bone biopsy, right first metatarsal.    ANESTHESIA:  MAC with local.    HEMOSTASIS:  None.    ESTIMATED BLOOD LOSS:  10 mL.    MATERIALS:  1/4-inch Nu Gauze packing dressing.    INJECTABLES:  0.5% Marcaine plain injected preoperatively.    COMPLICATIONS:  None apparent.    INTRAOPERATIVE FINDINGS:  With making the initial incision for bone biopsy, a watery bloody fluid drained.  More of this drained with an incision made through the ulcer.  The first metatarsal head was mobile, obviously fractured and separate from the body of the metatarsal.  Bone harvested appeared dark in color and was soft.    INDICATIONS FOR PROCEDURE:  Crispin Rojas is a 62-year-old male with medical history significant for type 2 diabetes, peripheral neuropathy, toe amputations and other medical issues who was admitted to [facility] due to concern for osteomyelitis and abscess of the right foot.  Please refer to my consultation note from 07/23/2022 for details.  We discussed amputation of the right first metatarsal, due to the imaging that showed pathologic fracture and suspected osteomyelitis throughout the first metatarsal.  These concerns are in the setting of a fairly unremarkable clinical appearance.  Judie elected to first have an incision and drainage, as well as bone biopsy, prior to proceeding with any amputation.  This is a reasonable request as long as he stays systemically stable an there is no rapid deterioration of his foot.  The procedures were  discussed in detail including the risks, the benefits, the postoperative cares, course and prognosis.  No guarantees were given.    DESCRIPTION OF PROCEDURE:  Cedric Law was transported to the operating room and placed supine on the operating table.  IV sedation was initiated.  A timeout was called.  Local anesthetic was injected into the right foot.  The right foot was prepped and draped in the normal aseptic fashion.  A timeout was called for the procedures.    A short longitudinal incision was made just proximal to the right first metatarsal head. Upon making this incision and deeper blunt dissection, a significant amount of watery bloody fluid drained.  There was no malodor.  Due to this, the incision and drainage procedure was performed first.  A second small incision was made through the ulcer or wound dehiscence area.  Again, a significant amount of this bloody watery fluid drained.  Next, sterile saline was used for irrigation.  This was placed in the distal incision with the egress at the more proximal incision.      The bone biopsy was performed next.  The incision through the ulcerative tissue was extended.  A rongeur was easily entered through this and down to the mobile first metatarsal head.  Bone was harvested from this region for both histologic analysis, as well as aerobic and anaerobic culture.  Additional irrigation was performed.  A 1/4-inch packing dressing with overlying well-padded compressive dressing was then placed.    Judie tolerated the anesthesia and procedure well.  We will continue the empiric antibiotics.  Infectious disease consultation is pending.  He will likely need a first ray amputation, yet we will await the bone biopsy and culture results.    Valentino Molina DPM        D: 2022   T: 2022   MT: [id number]    Name:     CEDRIC LAW  MRN:      [id number]        Account:        [id number]   :      1960           Procedure Date: 2022     Document:  [id number]

## 2022-07-24 NOTE — BRIEF OP NOTE
"   [facility]    Brief Operative Note    Pre-operative diagnosis: Acute osteomyelitis of right foot (H) [M86.171]  Wound dehiscence [T81.30XA]  Abscess of foot [L02.619]  Post-operative diagnosis Same as pre-operative diagnosis    Procedure: Procedure(s):  INCISION AND DRAINAGE, FOOT  BIOPSY, BONE, FOOT  Surgeon: Surgeon(s) and Role:     * Valentino Molina DPM - Primary  Anesthesia: MAC with Local   Estimated Blood Loss: 10 mL from 7/24/2022 10:58 AM to 7/24/2022 11:38 AM      Drains: None  Specimens:   ID Type Source Tests Collected by Time Destination   1 : Right First Metatarsal Bone Bone Biopsy Foot, Right SURGICAL PATHOLOGY EXAM Valentino Molina DPM 7/24/2022 11:24 AM    A : Right First Metatarsal Bone Culture Bone Biopsy Foot, Right ANAEROBIC BACTERIAL CULTURE ROUTINE, GRAM STAIN, AEROBIC BACTERIAL CULTURE ROUTINE Valentino Molina DPM 7/24/2022 11:28 AM      Findings:   10 ml or more of watery, bloody drainage upon making the incisions. No malodor. Bone harvested was soft, dark in color.  Complications: None.  Implants: 1-2 feet of 1/4\" Nugauze packing dressing    Plan:  Await bone biopsy and bone culture results  Continue empiric abx  Infectious Disease consult pending  Likely will need a partial or total first ray amputation  More urgent surgery if he becomes septic  Dr. Zee will resume inpatient management tomorrow, 7/25/22.     Valentino Molina DPM, FACFAS, MS  [facility] Department of Podiatry/Foot & Ankle Surgery  [phone]      "

## 2022-07-24 NOTE — PROVIDER NOTIFICATION
DATE:  7/24/2022   TIME OF RECEIPT FROM LAB:  0529  LAB TEST:  Blood cultures  LAB VALUE:  Gram positive cocci in clusters  RESULTS GIVEN WITH READ-BACK TO (PROVIDER):  Paged on-call provider  TIME LAB VALUE REPORTED TO PROVIDER:   0542

## 2022-07-24 NOTE — PROGRESS NOTES
[facility]    I called and spoke to Pat, regarding intra-operative findings.  We discussed the fluid that drained, as well as the appearance/ quality of the bone specimens harvested for biopsy.  He is aware of the packing dressing. In response to his question, I recommend he try to keep the dressing dry, should he bath or shower.       Valentino Molina DPM, FACGAEL, MS  [facility] Department of Podiatry/Foot & Ankle Surgery  [phone]

## 2022-07-24 NOTE — PROGRESS NOTES
[facility]    Medicine Progress Note - Hospitalist Service  Date of Admission: 7/22/2022     Assessment & Plan         Crispin Rojas is a 62 year old male with past medical history significant for type 2 diabetes mellitus, hypertension, hyperlipidemia, CVA, GILA, obesity, PVD, and recent right great toe amputation 06/2022 with persistent nonhealing surgical wound who presented to [facility] on 7/22/2022 with increased right foot pain and radiographic evidence of continued osteomyelitis with associated pathologic fracture and acute abscess.    Extensive, Progressive First Metatarsal Osteomyelitis w/Pathologic Fracture & Distal Abscess s/p I&D  Recent Right Great Toe Amputation 2/2 Grade 2 Proximal Osteomyelitis w/ Nonhealing Surgical Wound  Staph. aureus Bacteremia  Recent first digit amputation of RLE 06/2022 with persistent wound dehiscence. Develops acute, severe pain x2 weeks. MRI outpatient showing progressive osteomyelitis of entire first metatarsal with pathologic fracture and distal fluid collection consistent with developing abscess. Started on Vanc/Zosyn. Podiatry consulting and recommending I&D with bone biopsy 7/24/2022; now completed. ID consulted due to progressive osteo. Blood cultures from admission with Staph. aureus; further sensitivities pending.  -Podiatry consulted, appreciate recommendations  -ID consulted, appreciate recommendations  -Vancomycin (PharmD to dose)  -Zosyn  -TTE tomorrow    Fatigue  Progressive fatigue since recent surgery. Hgb chronically low; iron studies within normal limits. Likely fatigued due to ongoing infection/bacteremia, but will also check TSH.  -TSH    Type 2 Diabetes Mellitus  Holding basal insulin due to prolonged NPO this morning in anticipation of possible surgical intervention. MDSSI.    Normocytic Anemia: Hgb at baseline 11.7 on admission. Slightly decreased to 10.3 overnight; suspect dilutional.  Essential Hypertension:  "Holding PTA antihypertensives prior to surgery. Normotensive today.  GILA: CPAP.  Obesity: Complicates cares.  Peripheral Vascular Disease: Holding PTA ASA.     Diet: Orders Placed This Encounter      Moderate Consistent Carb (60 g CHO per Meal) Diet  DVT Prophylaxis: Pneumatic Compression Devices  Mccarthy Catheter: Not present  Code Status: Full Code    Expected discharge: Likely 2-3 days pending surgery and treatment of infection.    The patient's care was discussed with the Bedside Nurse and Patient.    Chapincito Johnson MD, S  Hospitalist Service  [facility]    Securely message with the Vocera Web Console (learn more here)  Text page via [facility] Paging/Directory    ______________________________________________________________________    Interval History   Nursing notes reviewed; no acute events overnight. Patient feeling well post-operatively. Pain 2-3 and manageable. Feeling a bit better today. No new symptoms.    A full 10+ point review of systems was performed and found to be negative with the exception of those items noted here.    Physical Exam   Vital signs:  Temp: 97.9  F (36.6  C) Temp src: Oral BP: 117/62 Pulse: 58   Resp: 21 SpO2: 93 % O2 Device: None (Room air)   Height: 180.3 cm (5' 11\") Weight: 105.7 kg (233 lb)  Estimated body mass index is 32.5 kg/m  as calculated from the following:    Height as of this encounter: 1.803 m (5' 11\").    Weight as of this encounter: 105.7 kg (233 lb).    General: Very pleasant male resting comfortably in hospital bed.  Awake, alert, interactive.  HEENT: Normocephalic, atraumatic.  PERRL, EOMI.  Conjunctiva clear, sclerae anicteric.  Mucous membranes moist.  Cardiac: Regular rate and rhythm without murmur, gallop, or rub.  No peripheral edema.  Respiratory: Normal work of breathing.  Clear to auscultation bilaterally without wheezing, rales, or rhonchi.  GI: Normal, active bowel sounds.  Abdomen soft, nontender, nondistended.  : " Deferred.  Musculoskeletal: Right foot wrapped with ace bandage.  Skin: No rashes or abrasions on exposed skin.  Neurologic: Alert and oriented x4.  Cranial nerves II through XII grossly intact.  Psychologic: Appropriate mood and affect.    Data   All laboratory results and other diagnostic data from the past 24 hours is available in Epic and has been personally reviewed.    Recent Labs   Lab 07/24/22  1332 07/24/22  1008 07/24/22  0944 07/24/22  0700 07/24/22  0600 07/23/22  0953 07/23/22  0544 07/22/22  1705   WBC  --   --  4.7  --   --   --  5.5 7.6   HGB  --   --  11.4*  --   --   --  10.3* 11.7*   MCV  --   --  90  --   --   --  90 90   PLT  --   --  293  --   --   --  275 298   NA  --   --   --   --  139  --  137 133   POTASSIUM  --   --   --   --  3.9  --  4.1 4.7   CHLORIDE  --   --   --   --  106  --  104 100   CO2  --   --   --   --  26  --  27 27   BUN  --   --   --   --  16  --  17 20   CR  --   --   --   --  1.05  1.06  --  0.94 1.02   ANIONGAP  --   --   --   --  7  --  6 6   NARA  --   --   --   --  8.9  --  8.9 9.6   GLC 82 105*  --  88 90   < > 97 104*   LIPASE  --   --   --   --   --   --   --  156    < > = values in this interval not displayed.     Recent Results (from the past 24 hour(s))   XR Surgery LIZETH L/T 5 Min Fluoro w Stills    Narrative    This exam was marked as non-reportable because it will not be read by a   radiologist or a [place] non-radiologist provider.         XR Foot Port Right 3 Views    Narrative    EXAM: XR FOOT PORT RIGHT 3 VIEWS  LOCATION: [facility]  DATE/TIME: 7/24/2022 12:00 PM    INDICATION: Post op bone biopsy from the fracture first metatarsal head. Foot pain.  COMPARISON: 07/21/2022 MRI.       Impression    IMPRESSION: Lytic destructive changes in the first metatarsal head and neck likely reflects osteomyelitis. Acute mildly displaced fracture of the neck of the first metatarsal neck. Amputation of the middle and distal phalanges of the  fourth toe.     I personally reviewed: no images or EKG's today.

## 2022-07-25 ENCOUNTER — APPOINTMENT (OUTPATIENT)
Dept: CARDIOLOGY | Facility: CLINIC | Age: 62
End: 2022-07-25
Attending: STUDENT IN AN ORGANIZED HEALTH CARE EDUCATION/TRAINING PROGRAM
Payer: COMMERCIAL

## 2022-07-25 LAB
ANION GAP SERPL CALCULATED.3IONS-SCNC: 7 MMOL/L (ref 3–14)
BACTERIA BLD CULT: ABNORMAL
BACTERIA BLD CULT: ABNORMAL
BUN SERPL-MCNC: 16 MG/DL (ref 7–30)
CALCIUM SERPL-MCNC: 8.7 MG/DL (ref 8.5–10.1)
CHLORIDE BLD-SCNC: 109 MMOL/L (ref 94–109)
CO2 SERPL-SCNC: 25 MMOL/L (ref 20–32)
CREAT SERPL-MCNC: 1.06 MG/DL (ref 0.66–1.25)
CREAT SERPL-MCNC: 1.1 MG/DL (ref 0.66–1.25)
ERYTHROCYTE [DISTWIDTH] IN BLOOD BY AUTOMATED COUNT: 13.2 % (ref 10–15)
GFR SERPL CREATININE-BSD FRML MDRD: 77 ML/MIN/1.73M2
GFR SERPL CREATININE-BSD FRML MDRD: 80 ML/MIN/1.73M2
GLUCOSE BLD-MCNC: 107 MG/DL (ref 70–99)
GLUCOSE BLDC GLUCOMTR-MCNC: 109 MG/DL (ref 70–99)
GLUCOSE BLDC GLUCOMTR-MCNC: 121 MG/DL (ref 70–99)
GLUCOSE BLDC GLUCOMTR-MCNC: 144 MG/DL (ref 70–99)
GLUCOSE BLDC GLUCOMTR-MCNC: 88 MG/DL (ref 70–99)
GLUCOSE BLDC GLUCOMTR-MCNC: 93 MG/DL (ref 70–99)
HCT VFR BLD AUTO: 32.5 % (ref 40–53)
HGB BLD-MCNC: 9.9 G/DL (ref 13.3–17.7)
LVEF ECHO: NORMAL
MCH RBC QN AUTO: 27.6 PG (ref 26.5–33)
MCHC RBC AUTO-ENTMCNC: 30.5 G/DL (ref 31.5–36.5)
MCV RBC AUTO: 91 FL (ref 78–100)
PLATELET # BLD AUTO: 237 10E3/UL (ref 150–450)
POTASSIUM BLD-SCNC: 4.1 MMOL/L (ref 3.4–5.3)
RBC # BLD AUTO: 3.59 10E6/UL (ref 4.4–5.9)
SODIUM SERPL-SCNC: 141 MMOL/L (ref 133–144)
WBC # BLD AUTO: 4.7 10E3/UL (ref 4–11)

## 2022-07-25 PROCEDURE — 80048 BASIC METABOLIC PNL TOTAL CA: CPT | Performed by: STUDENT IN AN ORGANIZED HEALTH CARE EDUCATION/TRAINING PROGRAM

## 2022-07-25 PROCEDURE — 258N000003 HC RX IP 258 OP 636: Performed by: PODIATRIST

## 2022-07-25 PROCEDURE — 85027 COMPLETE CBC AUTOMATED: CPT | Performed by: STUDENT IN AN ORGANIZED HEALTH CARE EDUCATION/TRAINING PROGRAM

## 2022-07-25 PROCEDURE — 99222 1ST HOSP IP/OBS MODERATE 55: CPT | Performed by: INTERNAL MEDICINE

## 2022-07-25 PROCEDURE — 99232 SBSQ HOSP IP/OBS MODERATE 35: CPT | Performed by: STUDENT IN AN ORGANIZED HEALTH CARE EDUCATION/TRAINING PROGRAM

## 2022-07-25 PROCEDURE — 120N000001 HC R&B MED SURG/OB

## 2022-07-25 PROCEDURE — 93306 TTE W/DOPPLER COMPLETE: CPT | Mod: 26 | Performed by: INTERNAL MEDICINE

## 2022-07-25 PROCEDURE — 36415 COLL VENOUS BLD VENIPUNCTURE: CPT | Performed by: STUDENT IN AN ORGANIZED HEALTH CARE EDUCATION/TRAINING PROGRAM

## 2022-07-25 PROCEDURE — 250N000011 HC RX IP 250 OP 636: Performed by: PODIATRIST

## 2022-07-25 PROCEDURE — 82565 ASSAY OF CREATININE: CPT | Performed by: PODIATRIST

## 2022-07-25 PROCEDURE — 93306 TTE W/DOPPLER COMPLETE: CPT

## 2022-07-25 PROCEDURE — 36415 COLL VENOUS BLD VENIPUNCTURE: CPT | Performed by: PODIATRIST

## 2022-07-25 RX ADMIN — VANCOMYCIN HYDROCHLORIDE 1250 MG: 10 INJECTION, POWDER, LYOPHILIZED, FOR SOLUTION INTRAVENOUS at 18:12

## 2022-07-25 RX ADMIN — TAZOBACTAM SODIUM AND PIPERACILLIN SODIUM 4.5 G: 500; 4 INJECTION, SOLUTION INTRAVENOUS at 02:44

## 2022-07-25 RX ADMIN — TAZOBACTAM SODIUM AND PIPERACILLIN SODIUM 4.5 G: 500; 4 INJECTION, SOLUTION INTRAVENOUS at 09:13

## 2022-07-25 RX ADMIN — TAZOBACTAM SODIUM AND PIPERACILLIN SODIUM 4.5 G: 500; 4 INJECTION, SOLUTION INTRAVENOUS at 21:10

## 2022-07-25 RX ADMIN — TAZOBACTAM SODIUM AND PIPERACILLIN SODIUM 4.5 G: 500; 4 INJECTION, SOLUTION INTRAVENOUS at 15:13

## 2022-07-25 RX ADMIN — VANCOMYCIN HYDROCHLORIDE 1250 MG: 10 INJECTION, POWDER, LYOPHILIZED, FOR SOLUTION INTRAVENOUS at 06:48

## 2022-07-25 ASSESSMENT — ACTIVITIES OF DAILY LIVING (ADL)
ADLS_ACUITY_SCORE: 21
ADLS_ACUITY_SCORE: 21
ADLS_ACUITY_SCORE: 22
ADLS_ACUITY_SCORE: 21
ADLS_ACUITY_SCORE: 22
ADLS_ACUITY_SCORE: 21

## 2022-07-25 NOTE — CONSULTS
ID consult dictated IMP 1 61 yo male R great toe osteo, 2 prior amps, now worse, I and D soft bone, bx and cx pending  2 MSSA bacteremia, some fatigue no obvious secondary sites    REC same for now , await all bx/cxs incl Follow-up BC

## 2022-07-25 NOTE — CONSULTS
Consult Date: 07/25/2022    INFECTIOUS DISEASE CONSULTATION    LOCATION:  Room 208, [facility].    REFERRING PHYSICIAN:  Chapincito Johnson MD.    IMPRESSION:    1.  A 62-year-old male, well known to me, readmitted with again worsening right great toe infection previously 2 amputations, felt fully resected, but now apparent recurrent infection and at surgery, evidence of soft bone.  Biopsy and cultures are pending.  Previous cultures had mixed josé, prominently Staphylococcus aureus.  3.  Current admission minimal systemic sepsis, but some prominent fatigue over the last several weeks.  One of blood cultures at admission growing sensitive Staphylococcus aureus, presumably coming from the foot.  No evidence of any secondary involved sites, no artificial material, etc.  4.  Prior history of recurrent diabetic foot infection and osteomyelitis that was felt fully resected when last seen in early June.  5.  Prior deep venous thrombosis recently.  6.  Diabetes mellitus.  7.  SULFA ALLERGY, BUT WAS PRIMARILY GI INTOLERANCE.    RECOMMENDATIONS:    1.  Continue current broad antibiotics while awaiting the foot culture.  2.  Serial blood cultures to make sure bacteremia clears.  3.  We will need extended IV antibiotics, regardless of the foot, status simply because of the bacteremia.  4.  Follow closely for any secondary sites.  5.  Await foot biopsy and culture to redirect, in particular further evidence of osteo, which clinically sounds like the case from the operative findings. Probably should do further resection. In this case is going to get IV antibiotics anyway.  Probably reasonable from a foot standpoint, almost regardless of how much amputation we do.    HISTORY OF PRESENT ILLNESS:  This 62-year-old male is seen in consultation and is well known to us.  The patient has a history of recurrent diabetic foot infections, had prior left foot amputations and then recently has had an evolving right great toe problem.   Has had 2 prior amputations due to proximal recurrence of infection.  At his last episode, it was felt that all was resected.  Cultures grew mixed josé, but prominently Staphylococcus aureus.  We did oral antibiotics, which he has been off now for several weeks.  Of some note, he has not felt particularly well for several weeks with some degree of fatigue and malaise, not true shaking chills or fever.  He was not bacteremic during prior episodes and did have blood cultures done.  The foot really was not that much worse.  He then noticed some swelling of his ankle and to a lesser extent the foot without it particularly looking at much worse distally.  He sought medical attention.  It has been evident there are further problems at the wound site and he has undergone an operative intervention and imaging that all show a possible problem. At surgery, there was some soft bone and full information from that is pending.  His blood culture from admission has a single culture growing Staphylococcus aureus.    PAST MEDICAL HISTORY:  The recurrent foot infections as noted, history of diabetes, reasonable control, prior deep venous thrombosis relatively recently.  Prior workup with minor peripheral vascular disease.    ALLERGIES:  AMONG THEM SULFA, WHICH WAS GI INTOLERANCE, NOT TRUE ALLERGY.    SOCIAL AND FAMILY HISTORY:  No recent travel or exposures.  No known resistant pathogens.    MEDICATIONS:  As listed.    REVIEW OF SYSTEMS:  Largely as above.  No other focal or localizing symptoms.  The systemic fatigue has been prominent for several weeks now.    PHYSICAL EXAMINATION:    GENERAL:  The patient appears his stated age, does not look particularly toxic or ill.  He is an excellent historian.  VITAL SIGNS:  Currently are normal including being afebrile.  HEENT:  No thrush or intraoral lesions.  Pupils reactive.  NECK:  Supple and nontender.  HEART:  Unremarkable.  LUNGS:  Unremarkable.  ABDOMEN:  Soft and  nontender.  EXTREMITIES:  Left foot prior amputation, but no signs of inflammation or infection.  Right foot with toe somewhat worse.  No major proximal cellulitis.  He has some mild stasis changes.    LABORATORY DATA:  Single blood culture growing Staphylococcus aureus. Foot I and D culture pending.  Recently had Staph aureus.  White blood cell count not elevated.    Thank you very much for the consultation.  I will follow the patient with you.    Carlos A Sharma MD        D: 2022   T: 2022   MT: [id number]    Name:     CEDRIC LAW  MRN:      [id number]        Account:      [id number]   :      1960           Consult Date: 2022     Document: [id number]

## 2022-07-25 NOTE — PLAN OF CARE
Goal Outcome Evaluation:    Pt is AOX4, VSS on RA and capno,  bedrest with bathroom privileges (ambulated to bathroom independently, but needs to call staff before getting out of bed). Dressing on R. Leg is CDI baseline numbness in feet, denies tingling in BL feet. Pt notes mild tenderness in R. Foot. Pt on IV abx for leg infection. Denies pain in R. Foot but says it is sore. No PRN pain medication requested.

## 2022-07-25 NOTE — PROGRESS NOTES
[place] [facility]  PROGRESS NOTE      ASSESSMENT:  1. Right foot ulcer with osteomyelitis to the first metatarsal s/p recent amputation on 6/5, now s/p right foot I&D and bone biopsy on 7/24/22  2. Bacteremia --> Staph aureus on 7/22     PLAN:   -Discussed ongoing treatment plan with patient. Having echo performed at bedside  -Discussed need to resect a significant portion of not all of the first metatarsal. He states he'd like to confirm infection by waiting for the bone cultures and biopsy. Discussed timing including that the biopsy may not be back for several days. No results back at this time.   -Also discussed likely need for long term abx for discharge, given bacteremia on admission. Would also be beneficial for his foot, given recent recurrent infection   -Cont IV abx  -WB to heel of right foot.   -Patient agrees to treatment plan. States overall, slightly less foot pain since surgery, but does still feel weakness and fatigued.       Tiny Soto DPM  [facility] Department of Podiatry/Foot & Ankle Surgery  [phone]          _______________________________________________________________________________________________________________________________________________    CHIEF COMPLAINT:      I was asked by Albaro Sandoval DO to evaluate this patient, Crispin Rojas, for suspected right foot abscess and osteomyelitis.    PATIENT HISTORY:  Crispin Rojas is a 62-year-old male seen in follow up for his right foot. States slightly less foot pain since surgery. Does still state he feels excessive tiredness. No fevers.     Review of Systems:  A 10 point review of systems was performed and is positive for that noted above in the patient history.  All other areas are negative.     PAST MEDICAL HISTORY:   Past Medical History:   Diagnosis Date     Cerebral infarction (H)     2010     Chronic infection      H/O blood clots 2022     Hyperlipidemia LDL goal <70      Hypertension       Numbness and tingling      Obesity      GILA (obstructive sleep apnea) 10/22/2018    CPAP intolerant     PVD (peripheral vascular disease) (H)     s/p left partial toe amputation     Renal stone      Tremor      Type 2 diabetes mellitus with diabetic polyneuropathy, with long-term current use of insulin (H)         PAST SURGICAL HISTORY:   Past Surgical History:   Procedure Laterality Date     AMPUTATE FOOT Left 8/18/2016    Procedure: AMPUTATE FOOT;  Surgeon: Jack Younger DPM;  Location: [facility] OR     AMPUTATE TOE(S) Right 2/1/2016    Procedure: AMPUTATE TOE(S);  Surgeon: Rachelle Manriquez DPM, Pod;  Location: [facility] OR     AMPUTATE TOE(S) Right 8/2/2019    Procedure: Right fourth toe partial amputation for treatment of osteomyelitis.;  Surgeon: Jack Younger DPM;  Location: [facility]     AMPUTATE TOE(S) Left 11/8/2019    Procedure: Left second toe amputation at the metatarsophalangeal joint.;  Surgeon: Rachelle Manriquez DPM, Podiatry/Foot and Ankle Surgery;  Location: [facility] OR     AMPUTATE TOE(S) Right 6/5/2022    Procedure: AMPUTATION OF RIGHT GREAT TOE;  Surgeon: Wili Quiles DPM;  Location: [facility] OR     ANGIOGRAM       COLONOSCOPY       COMBINED CYSTOSCOPY, RETROGRADES, URETEROSCOPY, INSERT STENT Left 8/21/2016    Procedure: COMBINED CYSTOSCOPY, RETROGRADES, URETEROSCOPY, INSERT STENT;  Surgeon: Artemio Valenzuela MD;  Location: [facility] OR     COMBINED CYSTOSCOPY, RETROGRADES, URETEROSCOPY, LASER HOLMIUM LITHOTRIPSY URETER(S), INSERT STENT Left 10/3/2016    Procedure: COMBINED CYSTOSCOPY, RETROGRADES, URETEROSCOPY, LASER HOLMIUM LITHOTRIPSY URETER(S), INSERT STENT;  Surgeon: Artemio Valenzuela MD;  Location: [facility] OR     EXTRACORPOREAL SHOCK WAVE LITHOTRIPSY (ESWL) Left 9/8/2016    Procedure: EXTRACORPOREAL SHOCK WAVE LITHOTRIPSY (ESWL);  Surgeon: Artemio Valenzuela MD;  Location: [facility] OR     RECESSION GASTROCNEMIUS Right 2/1/2016    Procedure: RECESSION GASTROCNEMIUS;  Surgeon: Rachelle Manriquez DPM,  Pod;  Location: [facility] OR        MEDICATIONS:  Reviewed in Epic.     ALLERGIES:    Allergies   Allergen Reactions     Bactrim [Sulfamethoxazole W/Trimethoprim] Nausea and Vomiting     Sulfa Drugs Nausea     Niacin Swelling     SIMCOR caused edema in extremities     Simvastatin Swelling     SIMCOR caused edema in extremities        SOCIAL HISTORY:   Social History     Socioeconomic History     Marital status:      Spouse name: Sydney     Number of children: 2     Years of education: Not on file     Highest education level: Master's degree (e.g., MA, MS, [place], MEd, MSW, ELIANA)   Occupational History     Occupation: marketing     Employer: [employer]     Comment: [employer]   Tobacco Use     Smoking status: Never Smoker     Smokeless tobacco: Never Used   Vaping Use     Vaping Use: Never used   Substance and Sexual Activity     Alcohol use: Yes     Alcohol/week: 0.0 standard drinks     Comment: rare---red wine 3x per month     Drug use: No     Sexual activity: Not Currently     Partners: Female   Other Topics Concern     Parent/sibling w/ CABG, MI or angioplasty before 65F 55M? No   Social History Narrative     Not on file     Social Determinants of Health     Financial Resource Strain: Low Risk      Difficulty of Paying Living Expenses: Not very hard   Food Insecurity: No Food Insecurity     Worried About Running Out of Food in the Last Year: Never true     Ran Out of Food in the Last Year: Never true   Transportation Needs: No Transportation Needs     Lack of Transportation (Medical): No     Lack of Transportation (Non-Medical): No   Physical Activity: Sufficiently Active     Days of Exercise per Week: 4 days     Minutes of Exercise per Session: 40 min   Stress: No Stress Concern Present     Feeling of Stress : Not at all   Social Connections: Moderately Isolated     Frequency of Communication with Friends and Family: Once a week     Frequency of Social Gatherings with Friends and Family: Never     Attends Congregational  "Services: More than 4 times per year     Active Member of Clubs or Organizations: No     Attends Club or Organization Meetings: Not on file     Marital Status:    Intimate Partner Violence: Not on file   Housing Stability: Low Risk      Unable to Pay for Housing in the Last Year: No     Number of Places Lived in the Last Year: 2     Unstable Housing in the Last Year: No        FAMILY HISTORY:   Family History   Problem Relation Age of Onset     Arthritis Mother         SLE     Connective Tissue Disorder Mother         lupus     Diabetes Mother      Cerebrovascular Disease Mother      Cancer Mother      Diabetes Father      Diabetes Maternal Grandfather      Diabetes Sister         EXAM:  Vitals: BP (!) 167/83 (BP Location: Left arm)   Pulse 55   Temp 98.1  F (36.7  C) (Oral)   Resp 18   Ht 1.803 m (5' 11\")   Wt 105.7 kg (233 lb)   SpO2 97%   BMI 32.50 kg/m    BMI= Body mass index is 32.5 kg/m .    LABS:   Lab Results   Component Value Date    A1C 6.6 06/03/2022    A1C 6.1 01/03/2022    A1C 6.2 09/14/2021    A1C 6.5 06/22/2021    A1C 8.3 01/08/2021    A1C 6.0 09/09/2020    A1C 7.8 05/12/2020    A1C 8.8 02/04/2020       Lab Results   Component Value Date    INR 1.08 11/07/2019    INR 1.04 08/19/2013       Lab Results   Component Value Date    WBC 5.5 07/23/2022    WBC 6.5 05/10/2021     Lab Results   Component Value Date    HGB 10.3 07/23/2022    HGB 12.1 05/10/2021     Lab Results   Component Value Date     07/23/2022     05/10/2021       All cultures:  Recent Labs   Lab 07/24/22  0020 07/22/22  1929 07/22/22  1708   CULTURE No growth after 1 day  No growth after 1 day Positive on the 1st day of incubation*  Gram positive cocci in clusters* Positive on the 1st day of incubation*  Staphylococcus aureus*     General appearance:    Crispin is alert and fully cooperative with history & exam.  No sign of distress is noted during the visit.      Psychiatric: Affect is pleasant & appropriate.  " Crispin is motivated to improve health.       Respiratory: Breathing is regular & unlabored while sitting.      HEENT: Hearing is intact to spoken word.  Speech is clear.  No gross evidence of visual impairment that would impact ambulation.      Dermatologic: Dressing changed on right foot. Minimal sanguinous drainage. No purulence expressed. Nontender. No cellulitis or ascending infection.      Vascular: Dorsalis pedis and posterior tibial pulses are strongly palpable, right foot.      Neurologic: Lower extremity sensation is diminished, bilateral foot, to light touch.  No evidence of neurological-based weakness or contracture in the lower extremities.       Musculoskeletal: Patient is ambulatory without an assistive device or brace.  Digital contractures.  The right hallux has been amputated.  The left hallux and second toes are amputated.    IMAGING:   Impression:  Osteomyelitis of the entirety of the first metatarsal.  *  Pathologic fracture deformity of the distal first metatarsal,  similar to recent radiographs.  *  Rim-enhancing fluid collection compatible with abscess surrounding  the distal first metatarsal measuring up to 4.0 cm.     FLROA VU MD (Joe)

## 2022-07-25 NOTE — PLAN OF CARE
Goal Outcome Evaluation: Ongoing, progressing.    Pertinent Assessments: VSS. Denies pain. A/Ox4. On RA. LS clear. Surgical dressing intact to RLE. Baseline numbness/tingling. Voiding adequately per pt report. Bedrest with BRP, up independently.   Major Shift Events: ECHO - EF 55-60%.   Treatment Plan: IV abx. ID consult. Podiatry following, per note probable need for LT IV abx as well as further R metatarsal amputation following bx results. K replacement protocol - recheck in AM. Anticipate return home at time of discharge, anticipate 2+ days.

## 2022-07-25 NOTE — PROGRESS NOTES
[facility]    Medicine Progress Note - Hospitalist Service  Date of Admission: 7/22/2022     Assessment & Plan         Crispin Rojas is a 62 year old male with past medical history significant for type 2 diabetes mellitus, hypertension, hyperlipidemia, CVA, GILA, obesity, PVD, and recent right great toe amputation 06/2022 with persistent nonhealing surgical wound who presented to [facility] on 7/22/2022 with increased right foot pain and radiographic evidence of continued osteomyelitis with associated pathologic fracture and acute abscess.    Extensive, Progressive First Metatarsal Osteomyelitis w/Pathologic Fracture & Distal Abscess s/p I&D  Recent Right Great Toe Amputation 2/2 Grade 2 Proximal Osteomyelitis w/ Nonhealing Surgical Wound  Staph. aureus Bacteremia  Recent first digit amputation of RLE 06/2022 with persistent wound dehiscence. Develops acute, severe pain x2 weeks. MRI outpatient showing progressive osteomyelitis of entire first metatarsal with pathologic fracture and distal fluid collection consistent with developing abscess. Started on Vanc/Zosyn. Podiatry consulting and recommending I&D with bone biopsy 7/24/2022; now completed. Blood cultures from admission with Staph. aureus; further sensitivities pending. TTE obtained today to r/o endocarditis. ID consult pending.  -Podiatry consulted, appreciate recommendations  -ID consulted, appreciate recommendations  -Vancomycin (PharmD to dose)  -Zosyn    Fatigue  Progressive fatigue since recent surgery. Hgb chronically low; iron studies within normal limits. TSH normal. Likely fatigued due to ongoing infection/bacteremia.    Type 2 Diabetes Mellitus: MDSSI. Euglycemic and not requiring much insulin.  Normocytic Anemia: Hgb at baseline 11.7 on admission. Slightly decreased to 10.3 overnight; suspect dilutional.  Essential Hypertension: Holding PTA antihypertensives prior to surgery. Normotensive today.  GILA:  "CPAP.  Obesity: Complicates cares.  Peripheral Vascular Disease: Holding PTA ASA.     Diet: Orders Placed This Encounter      Moderate Consistent Carb (60 g CHO per Meal) Diet  DVT Prophylaxis: Pneumatic Compression Devices  Mccarthy Catheter: Not present  Code Status: Full Code    Expected discharge: Likely 2-3 days pending surgery and treatment of infection.    The patient's care was discussed with the Bedside Nurse and Patient.    Chapincito Johnson MD, S  Hospitalist Service  [facility]    Securely message with the Vocera Web Console (learn more here)  Text page via Be[employer] Paging/Directory    ______________________________________________________________________    Interval History   Nursing notes reviewed; no acute events overnight. Patient feeling well without pain this morning. No new symptoms. Continues to have fatigue.    A full 10+ point review of systems was performed and found to be negative with the exception of those items noted here.    Physical Exam   Vital signs:  Temp: 97.8  F (36.6  C) Temp src: Oral BP: 136/73 Pulse: 52   Resp: 18 SpO2: 95 % O2 Device: None (Room air)   Height: 180.3 cm (5' 11\") Weight: 105.7 kg (233 lb)  Estimated body mass index is 32.5 kg/m  as calculated from the following:    Height as of this encounter: 1.803 m (5' 11\").    Weight as of this encounter: 105.7 kg (233 lb).    General: Very pleasant male resting comfortably in hospital bed.  Awake, alert, interactive.  HEENT: Normocephalic, atraumatic.  PERRL, EOMI.  Conjunctiva clear, sclerae anicteric.  Mucous membranes moist.  Cardiac: Regular rate and rhythm without murmur, gallop, or rub.  No peripheral edema.  Respiratory: Normal work of breathing.  Clear to auscultation bilaterally without wheezing, rales, or rhonchi.  GI: Normal, active bowel sounds.  Abdomen soft, nontender, nondistended.  : Deferred.  Musculoskeletal: Right foot wrapped with ace bandage.  Skin: No rashes or abrasions on exposed " skin.  Neurologic: Alert and oriented x4.  Cranial nerves II through XII grossly intact.  Psychologic: Appropriate mood and affect.    Data   All laboratory results and other diagnostic data from the past 24 hours is available in Epic and has been personally reviewed.    Recent Labs   Lab 07/25/22  0905 07/25/22  0628 07/25/22  0548 07/25/22  0233 07/24/22  1008 07/24/22  0944 07/24/22  0700 07/24/22  0600 07/23/22  0953 07/23/22  0544 07/22/22  1705   WBC 4.7  --   --   --   --  4.7  --   --   --  5.5 7.6   HGB 9.9*  --   --   --   --  11.4*  --   --   --  10.3* 11.7*   MCV 91  --   --   --   --  90  --   --   --  90 90     --   --   --   --  293  --   --   --  275 298   NA  --   --  141  --   --   --   --  139  --  137 133   POTASSIUM  --   --  4.1  --   --   --   --  3.9  --  4.1 4.7   CHLORIDE  --   --  109  --   --   --   --  106  --  104 100   CO2  --   --  25  --   --   --   --  26  --  27 27   BUN  --   --  16  --   --   --   --  16  --  17 20   CR  --   --  1.10  1.06  --   --   --   --  1.05  1.06  --  0.94 1.02   ANIONGAP  --   --  7  --   --   --   --  7  --  6 6   NARA  --   --  8.7  --   --   --   --  8.9  --  8.9 9.6   GLC  --  88 107* 121*   < >  --    < > 90   < > 97 104*   LIPASE  --   --   --   --   --   --   --   --   --   --  156    < > = values in this interval not displayed.     No results found for this or any previous visit (from the past 24 hour(s)).  I personally reviewed: no images or EKG's today.

## 2022-07-26 ENCOUNTER — APPOINTMENT (OUTPATIENT)
Dept: ULTRASOUND IMAGING | Facility: CLINIC | Age: 62
End: 2022-07-26
Attending: PHYSICIAN ASSISTANT
Payer: COMMERCIAL

## 2022-07-26 LAB
CREAT SERPL-MCNC: 1.14 MG/DL (ref 0.66–1.25)
CRP SERPL-MCNC: 28.06 MG/L
ERYTHROCYTE [SEDIMENTATION RATE] IN BLOOD BY WESTERGREN METHOD: 93 MM/HR (ref 0–20)
GFR SERPL CREATININE-BSD FRML MDRD: 74 ML/MIN/1.73M2
GLUCOSE BLDC GLUCOMTR-MCNC: 102 MG/DL (ref 70–99)
GLUCOSE BLDC GLUCOMTR-MCNC: 91 MG/DL (ref 70–99)
GLUCOSE BLDC GLUCOMTR-MCNC: 93 MG/DL (ref 70–99)
GLUCOSE BLDC GLUCOMTR-MCNC: 96 MG/DL (ref 70–99)
GLUCOSE BLDC GLUCOMTR-MCNC: 98 MG/DL (ref 70–99)
PATH REPORT.COMMENTS IMP SPEC: NORMAL
PATH REPORT.COMMENTS IMP SPEC: NORMAL
PATH REPORT.FINAL DX SPEC: NORMAL
PATH REPORT.GROSS SPEC: NORMAL
PATH REPORT.MICROSCOPIC SPEC OTHER STN: NORMAL
PATH REPORT.RELEVANT HX SPEC: NORMAL
PHOTO IMAGE: NORMAL
POTASSIUM BLD-SCNC: 4.4 MMOL/L (ref 3.4–5.3)
VANCOMYCIN SERPL-MCNC: 19.4 UG/ML

## 2022-07-26 PROCEDURE — 250N000011 HC RX IP 250 OP 636: Performed by: PODIATRIST

## 2022-07-26 PROCEDURE — 250N000011 HC RX IP 250 OP 636: Performed by: PHYSICIAN ASSISTANT

## 2022-07-26 PROCEDURE — 99024 POSTOP FOLLOW-UP VISIT: CPT | Mod: GC | Performed by: PODIATRIST

## 2022-07-26 PROCEDURE — 86140 C-REACTIVE PROTEIN: CPT | Performed by: PODIATRIST

## 2022-07-26 PROCEDURE — 36415 COLL VENOUS BLD VENIPUNCTURE: CPT | Performed by: PODIATRIST

## 2022-07-26 PROCEDURE — 36415 COLL VENOUS BLD VENIPUNCTURE: CPT | Performed by: PHYSICIAN ASSISTANT

## 2022-07-26 PROCEDURE — 93971 EXTREMITY STUDY: CPT | Mod: RT

## 2022-07-26 PROCEDURE — 250N000013 HC RX MED GY IP 250 OP 250 PS 637: Performed by: PHYSICIAN ASSISTANT

## 2022-07-26 PROCEDURE — 82565 ASSAY OF CREATININE: CPT | Performed by: PODIATRIST

## 2022-07-26 PROCEDURE — 80202 ASSAY OF VANCOMYCIN: CPT | Performed by: PHYSICIAN ASSISTANT

## 2022-07-26 PROCEDURE — 84132 ASSAY OF SERUM POTASSIUM: CPT | Performed by: STUDENT IN AN ORGANIZED HEALTH CARE EDUCATION/TRAINING PROGRAM

## 2022-07-26 PROCEDURE — 85652 RBC SED RATE AUTOMATED: CPT | Performed by: PODIATRIST

## 2022-07-26 PROCEDURE — 99233 SBSQ HOSP IP/OBS HIGH 50: CPT | Performed by: INTERNAL MEDICINE

## 2022-07-26 PROCEDURE — 120N000001 HC R&B MED SURG/OB

## 2022-07-26 PROCEDURE — 99232 SBSQ HOSP IP/OBS MODERATE 35: CPT | Performed by: PHYSICIAN ASSISTANT

## 2022-07-26 PROCEDURE — 87040 BLOOD CULTURE FOR BACTERIA: CPT | Performed by: PHYSICIAN ASSISTANT

## 2022-07-26 PROCEDURE — 258N000003 HC RX IP 258 OP 636: Performed by: PODIATRIST

## 2022-07-26 RX ORDER — LISINOPRIL 40 MG/1
40 TABLET ORAL DAILY
Status: DISCONTINUED | OUTPATIENT
Start: 2022-07-26 | End: 2022-08-01 | Stop reason: HOSPADM

## 2022-07-26 RX ORDER — AMLODIPINE BESYLATE 5 MG/1
5 TABLET ORAL 2 TIMES DAILY
Status: DISCONTINUED | OUTPATIENT
Start: 2022-07-26 | End: 2022-08-01 | Stop reason: HOSPADM

## 2022-07-26 RX ORDER — PRIMIDONE 50 MG/1
50 TABLET ORAL EVERY 8 HOURS SCHEDULED
Status: DISCONTINUED | OUTPATIENT
Start: 2022-07-26 | End: 2022-08-01 | Stop reason: HOSPADM

## 2022-07-26 RX ORDER — PANTOPRAZOLE SODIUM 40 MG/1
40 TABLET, DELAYED RELEASE ORAL 2 TIMES DAILY
Status: DISCONTINUED | OUTPATIENT
Start: 2022-07-26 | End: 2022-07-26

## 2022-07-26 RX ORDER — HYDRALAZINE HYDROCHLORIDE 20 MG/ML
10 INJECTION INTRAMUSCULAR; INTRAVENOUS EVERY 4 HOURS PRN
Status: DISCONTINUED | OUTPATIENT
Start: 2022-07-26 | End: 2022-08-01 | Stop reason: HOSPADM

## 2022-07-26 RX ORDER — ATORVASTATIN CALCIUM 40 MG/1
40 TABLET, FILM COATED ORAL DAILY
Status: DISCONTINUED | OUTPATIENT
Start: 2022-07-26 | End: 2022-08-01 | Stop reason: HOSPADM

## 2022-07-26 RX ORDER — PANTOPRAZOLE SODIUM 40 MG/1
40 TABLET, DELAYED RELEASE ORAL
Status: DISCONTINUED | OUTPATIENT
Start: 2022-07-27 | End: 2022-08-01 | Stop reason: HOSPADM

## 2022-07-26 RX ORDER — HEPARIN SODIUM 10000 [USP'U]/100ML
0-5000 INJECTION, SOLUTION INTRAVENOUS CONTINUOUS
Status: DISPENSED | OUTPATIENT
Start: 2022-07-26 | End: 2022-07-28

## 2022-07-26 RX ORDER — GABAPENTIN 400 MG/1
400 CAPSULE ORAL 3 TIMES DAILY
Status: DISCONTINUED | OUTPATIENT
Start: 2022-07-26 | End: 2022-08-01 | Stop reason: HOSPADM

## 2022-07-26 RX ORDER — HEPARIN SODIUM 10000 [USP'U]/100ML
0-5000 INJECTION, SOLUTION INTRAVENOUS CONTINUOUS
Status: DISCONTINUED | OUTPATIENT
Start: 2022-07-26 | End: 2022-07-26

## 2022-07-26 RX ADMIN — GABAPENTIN 400 MG: 400 CAPSULE ORAL at 20:32

## 2022-07-26 RX ADMIN — LISINOPRIL 40 MG: 40 TABLET ORAL at 14:32

## 2022-07-26 RX ADMIN — TAZOBACTAM SODIUM AND PIPERACILLIN SODIUM 4.5 G: 500; 4 INJECTION, SOLUTION INTRAVENOUS at 10:02

## 2022-07-26 RX ADMIN — VANCOMYCIN HYDROCHLORIDE 1250 MG: 10 INJECTION, POWDER, LYOPHILIZED, FOR SOLUTION INTRAVENOUS at 06:14

## 2022-07-26 RX ADMIN — TAZOBACTAM SODIUM AND PIPERACILLIN SODIUM 4.5 G: 500; 4 INJECTION, SOLUTION INTRAVENOUS at 16:38

## 2022-07-26 RX ADMIN — PRIMIDONE 50 MG: 50 TABLET ORAL at 14:32

## 2022-07-26 RX ADMIN — HEPARIN SODIUM AND DEXTROSE 1200 UNITS/HR: 10000; 5 INJECTION INTRAVENOUS at 20:19

## 2022-07-26 RX ADMIN — VANCOMYCIN HYDROCHLORIDE 1250 MG: 10 INJECTION, POWDER, LYOPHILIZED, FOR SOLUTION INTRAVENOUS at 18:25

## 2022-07-26 RX ADMIN — PRIMIDONE 50 MG: 50 TABLET ORAL at 22:12

## 2022-07-26 RX ADMIN — TAZOBACTAM SODIUM AND PIPERACILLIN SODIUM 4.5 G: 500; 4 INJECTION, SOLUTION INTRAVENOUS at 22:12

## 2022-07-26 RX ADMIN — ATORVASTATIN CALCIUM 40 MG: 40 TABLET, FILM COATED ORAL at 14:32

## 2022-07-26 RX ADMIN — TAZOBACTAM SODIUM AND PIPERACILLIN SODIUM 4.5 G: 500; 4 INJECTION, SOLUTION INTRAVENOUS at 02:52

## 2022-07-26 RX ADMIN — AMLODIPINE BESYLATE 5 MG: 5 TABLET ORAL at 20:32

## 2022-07-26 RX ADMIN — GABAPENTIN 400 MG: 400 CAPSULE ORAL at 14:32

## 2022-07-26 ASSESSMENT — ACTIVITIES OF DAILY LIVING (ADL)
ADLS_ACUITY_SCORE: 21

## 2022-07-26 NOTE — PROGRESS NOTES
[facility]  Internal Medicine  Progress Note    Date of Service: 7/26/2022    Patient: Crispin Rojas  MRN: [id number]  Admission Date: 7/22/2022      Assessment & Plan: Crispin Rojas is a 62 year old male with past medical history significant for type 2 diabetes mellitus, hypertension, hyperlipidemia, CVA, GILA, obesity, PVD, and recent right great toe amputation 06/2022 with persistent nonhealing surgical wound who presented to [facility] on 7/22/2022 with increased right foot pain and radiographic evidence of continued osteomyelitis with associated pathologic fracture and acute abscess.    Right Foot Ulcer with Osteomyelitis  POD #2 I/D right foot with bone biopsy of the right first metatarsal   Staph Aureus Bacteremia - pt with a recent right great toe amputation 06/2022 with persistent nonhealing surgical wound who presented to [facility] on 7/22/2022 with increased right foot pain and radiographic evidence of continued osteomyelitis with associated pathologic fracture and acute abscess.  Blood cultures positive on 7/22 for Staph Aureus and Staph Hominis.  Bone bx cx 7/24 positive for Staph Aureus  - Podiatry and ID following  - follow serial blood cultures  - continue Zosyn and Vancomycin     Type 2 Diabetes Mellitus  Peripheral Neuropathy - diabetes diagnosed in 1990.  Hgb A1C 6.6 (6/2022).  BS have been .  Pt with a poor appetite.     - hold pta Metformin, Ozempic, home sliding scale insulin  - pt has not taken his pta Tresiba for the past one week due to poor appetite and held on admission due to npo status for surgery.  Continue to hold as BS have only been 90-100s  - resume pta Gabapentin  - continue ISS protocol    Hx RLE DVT - (6/3/2022) RLE Doppler U/S revealed a subocclusive DVT within the right posterior tibial vein.  Likely provoked in the setting of right toe infection at that time.  Discharged on Eliquis with plan for 3 months of therapy.  -  "Eliquis held on admission.  Will continue to hold due to possible further surgeries  - will obtain RLE doppler U/S, if DVT still present will start heparin gtt    Chronic Anemia - hgb 9.9, baseline 11.  Monitor    HTN - resume pta Amlodipine and Lisinopril.  Holding Lasix and Hctz.    GILA - patient does not tolerate cpap.  Symptoms improved after weight loss.  Monitor    GERD - resume pta Omeprazole    Peripheral Vascular Disease - holding pta ASA.  Continue Atorvastatin    Hx CVA - 2010.  No residual deficits other than difficulty with remembering faces/names.  Holding ASA for possible further surgeries.  Continue Atorvastatin.    Essential Tremor - resume pta Primidone    Obesity, BMI 32    CODE: full  DVT: scd  Diet/fluids: regular      Maria G Hernandez MS PA-C  Hospitalist Physician Assistant  [facility]      Subjective & Interval Hx:    Patient reports mild right foot pain when he was up and moving.  No pain at rest.  Denies chest pain, shortness of breath, cough, abdominal pain.  Poor appetite and has felt fatigued since his last admission.    Last 24 hr care team notes reviewed.   ROS:  4 point ROS including Respiratory, CV, GI and , other than that noted in the HPI, is negative.    Physical Exam:    Blood pressure (!) 154/68, pulse 50, temperature 97.3  F (36.3  C), temperature source Oral, resp. rate 18, height 1.803 m (5' 11\"), weight 105.7 kg (233 lb), SpO2 94 %.  General: Alert, interactive, NAD  HEENT: AT/NC  Resp: clear to auscultation bilaterally, no crackles or wheezes  Cardiac: regular rate and rhythm, no murmur  Abdomen: Soft, nontender, nondistended. +BS.  No HSM or masses, no rebound or guarding.  Extremities: No LE edema.  Right foot wrapped, dressing c/d/i  Skin: Warm and dry  Neuro: Alert & oriented x 3, Cns 2-12 intact, moves all extremities equally    Labs & Images:  Reviewed in Epic   Medications:    Current Facility-Administered Medications   Medication     acetaminophen " (TYLENOL) tablet 650 mg    Or     acetaminophen (TYLENOL) Suppository 650 mg     glucose gel 15-30 g    Or     dextrose 50 % injection 25-50 mL    Or     glucagon injection 1 mg     hydrALAZINE (APRESOLINE) injection 10 mg     insulin aspart (NovoLOG) injection (RAPID ACTING)     insulin aspart (NovoLOG) injection (RAPID ACTING)     insulin aspart (NovoLOG) injection (RAPID ACTING)     lidocaine (LMX4) cream     lidocaine 1 % 0.1-1 mL     melatonin tablet 1 mg     naloxone (NARCAN) injection 0.2 mg    Or     naloxone (NARCAN) injection 0.4 mg    Or     naloxone (NARCAN) injection 0.2 mg    Or     naloxone (NARCAN) injection 0.4 mg     ondansetron (ZOFRAN ODT) ODT tab 4 mg    Or     ondansetron (ZOFRAN) injection 4 mg     oxyCODONE (ROXICODONE) tablet 5 mg     piperacillin-tazobactam (ZOSYN) intermittent infusion 4.5 g     prochlorperazine (COMPAZINE) injection 10 mg    Or     prochlorperazine (COMPAZINE) tablet 10 mg    Or     prochlorperazine (COMPAZINE) suppository 25 mg     senna-docusate (SENOKOT-S/PERICOLACE) 8.6-50 MG per tablet 1 tablet    Or     senna-docusate (SENOKOT-S/PERICOLACE) 8.6-50 MG per tablet 2 tablet     sodium chloride (PF) 0.9% PF flush 3 mL     sodium chloride (PF) 0.9% PF flush 3 mL     vancomycin 1250 mg in 0.9% NaCl 250 mL intermittent infusion 1,250 mg

## 2022-07-26 NOTE — PROGRESS NOTES
[facility]    Followed up on LE doppler US in the setting of previously known RLE DVT.     RLE US shows:    1. Deep vein thrombosis in one of the paired right gastrocnemius  veins.  2. Resolution of DVT in the posterior tibial vein.      Will start heparin gtt w NO bolus in case podiatry surgery is needed.

## 2022-07-26 NOTE — PLAN OF CARE
"Care from [id number]    Inpatient Progress Note:  For complete assessment see flow sheet documentation.    BP (!) 163/82 (BP Location: Left arm)   Pulse 54   Temp 98.1  F (36.7  C) (Oral)   Resp 18   Ht 1.803 m (5' 11\")   Wt 105.7 kg (233 lb)   SpO2 95%   BMI 32.50 kg/m         Orientation: A & O x 4  Neuro: WNL  Pain status:   Activity: Bedrest with bathroom privileges.  Resp: Lungs clear, no SOB  Cardiac: WNL  GI: Audible bowel sounds, last BM 7/24  :  Voiding spontaneously  Skin:  WNL except right foot dressing, DAISY  LDA: Peripheral  Infusions: IV Zosyn & Vanco  Pertinent Labs: K protocol  Diet: Mod carb diet  Consults: Awaiting biopsy culture from soft bone tissue.  Discharge Plan: TBD   Goal Outcome Evaluation:                      "

## 2022-07-26 NOTE — PROGRESS NOTES
[facility]  PROGRESS NOTE      ASSESSMENT:  1. Right foot ulcer with osteomyelitis to the first metatarsal s/p recent amputation on 6/5, now s/p right foot I&D and bone biopsy on 7/24/22  2. Bacteremia --> Staph aureus on 7/22     PLAN:   -Discussed ongoing treatment plan with patient, including bone cultures which now show staph aureus. Bone biopsy is still pending   -Patient would like to wait for biopsy to result before further surgery. Did discuss with him that bone cultures showing staph aureus, in conjunction with positive blood cultures, indicates high likelihood of bone infection to the first metatarsal. He may also only be agreeable to a partial resection of the first metatarsal with further proximal margins. This is a reasonable plan as removing the whole first metatarsal is not preferred, due to the biomechanical function of maintaining the base of the metatarsal and he will be on prolonged abx due to bacteremia.   -Added a sed and crp to evaluate current response, to numbers on admission. Sed: 90 and CRP: 70 on 7/22   -Cont IV abx  -WB to heel of right foot  -Will follow       Tiny Soto DPM  [facility] Department of Podiatry/Foot & Ankle Surgery  [phone]      ____________________________________________________________________________________________________  CHIEF COMPLAINT:      I was asked by Albaro Sandoval DO to evaluate this patient, Crispin Rojas, for suspected right foot abscess and osteomyelitis.    PATIENT HISTORY:  Crispin Rojas is a 62-year-old male seen in follow up for his right foot. No complaints. Denies N/F/V/C/D. Has several good questions regarding ongoing treatment plan.     Review of Systems:  A 10 point review of systems was performed and is positive for that noted above in the patient history.  All other areas are negative.     PAST MEDICAL HISTORY:   Past Medical History:   Diagnosis Date     Cerebral infarction (H)     2010      Chronic infection      H/O blood clots 2022     Hyperlipidemia LDL goal <70      Hypertension      Numbness and tingling      Obesity      GILA (obstructive sleep apnea) 10/22/2018    CPAP intolerant     PVD (peripheral vascular disease) (H)     s/p left partial toe amputation     Renal stone      Tremor      Type 2 diabetes mellitus with diabetic polyneuropathy, with long-term current use of insulin (H)         PAST SURGICAL HISTORY:   Past Surgical History:   Procedure Laterality Date     AMPUTATE FOOT Left 8/18/2016    Procedure: AMPUTATE FOOT;  Surgeon: Jack Younger DPM;  Location: [facility]     AMPUTATE TOE(S) Right 2/1/2016    Procedure: AMPUTATE TOE(S);  Surgeon: Rachelle Manriquez DPM, Pod;  Location: [facility]     AMPUTATE TOE(S) Right 8/2/2019    Procedure: Right fourth toe partial amputation for treatment of osteomyelitis.;  Surgeon: Jack Younger DPM;  Location: [facility]     AMPUTATE TOE(S) Left 11/8/2019    Procedure: Left second toe amputation at the metatarsophalangeal joint.;  Surgeon: Rachelle Manriquez DPM, Podiatry/Foot and Ankle Surgery;  Location: [facility]     AMPUTATE TOE(S) Right 6/5/2022    Procedure: AMPUTATION OF RIGHT GREAT TOE;  Surgeon: Wili Quiles DPM;  Location: [facility] OR     ANGIOGRAM       BIOPSY BONE FOOT Right 7/24/2022    Procedure: Bone biopsy, right first metatarsal.;  Surgeon: Valentino Molina DPM;  Location: [facility]     COLONOSCOPY       COMBINED CYSTOSCOPY, RETROGRADES, URETEROSCOPY, INSERT STENT Left 8/21/2016    Procedure: COMBINED CYSTOSCOPY, RETROGRADES, URETEROSCOPY, INSERT STENT;  Surgeon: Artemio Valenzuela MD;  Location: [facility]     COMBINED CYSTOSCOPY, RETROGRADES, URETEROSCOPY, LASER HOLMIUM LITHOTRIPSY URETER(S), INSERT STENT Left 10/3/2016    Procedure: COMBINED CYSTOSCOPY, RETROGRADES, URETEROSCOPY, LASER HOLMIUM LITHOTRIPSY URETER(S), INSERT STENT;  Surgeon: Artemio Valenzuela MD;  Location: [facility]     EXTRACORPOREAL SHOCK WAVE LITHOTRIPSY (ESWL) Left  9/8/2016    Procedure: EXTRACORPOREAL SHOCK WAVE LITHOTRIPSY (ESWL);  Surgeon: Artemio Valenzuela MD;  Location: [facility]     INCISION AND DRAINAGE FOOT, COMBINED Right 7/24/2022    Procedure: Incision and drainage, right foot ;  Surgeon: Valentino Molina DPM;  Location: [facility]     RECESSION GASTROCNEMIUS Right 2/1/2016    Procedure: RECESSION GASTROCNEMIUS;  Surgeon: Rachelle Manriquez DPM, Pod;  Location: [facility]        MEDICATIONS:  Reviewed in Epic.     ALLERGIES:    Allergies   Allergen Reactions     Bactrim [Sulfamethoxazole W/Trimethoprim] Nausea and Vomiting     Sulfa Drugs Nausea     Niacin Swelling     SIMCOR caused edema in extremities     Simvastatin Swelling     SIMCOR caused edema in extremities        SOCIAL HISTORY:   Social History     Socioeconomic History     Marital status:      Spouse name: Sydney     Number of children: 2     Years of education: Not on file     Highest education level: Master's degree (e.g., MA, MS, [place], MEd, MSW, ELIANA)   Occupational History     Occupation: marketing     Employer: [employer]     Comment: [employer]   Tobacco Use     Smoking status: Never Smoker     Smokeless tobacco: Never Used   Vaping Use     Vaping Use: Never used   Substance and Sexual Activity     Alcohol use: Yes     Alcohol/week: 0.0 standard drinks     Comment: rare---red wine 3x per month     Drug use: No     Sexual activity: Not Currently     Partners: Female   Other Topics Concern     Parent/sibling w/ CABG, MI or angioplasty before 65F 55M? No   Social History Narrative     Not on file     Social Determinants of Health     Financial Resource Strain: Low Risk      Difficulty of Paying Living Expenses: Not very hard   Food Insecurity: No Food Insecurity     Worried About Running Out of Food in the Last Year: Never true     Ran Out of Food in the Last Year: Never true   Transportation Needs: No Transportation Needs     Lack of Transportation (Medical): No     Lack of Transportation  "(Non-Medical): No   Physical Activity: Sufficiently Active     Days of Exercise per Week: 4 days     Minutes of Exercise per Session: 40 min   Stress: No Stress Concern Present     Feeling of Stress : Not at all   Social Connections: Moderately Isolated     Frequency of Communication with Friends and Family: Once a week     Frequency of Social Gatherings with Friends and Family: Never     Attends Roman [employer] Services: More than 4 times per year     Active Member of Clubs or Organizations: No     Attends Club or Organization Meetings: Not on file     Marital Status:    Intimate Partner Violence: Not on file   Housing Stability: Low Risk      Unable to Pay for Housing in the Last Year: No     Number of Places Lived in the Last Year: 2     Unstable Housing in the Last Year: No        FAMILY HISTORY:   Family History   Problem Relation Age of Onset     Arthritis Mother         SLE     Connective Tissue Disorder Mother         lupus     Diabetes Mother      Cerebrovascular Disease Mother      Cancer Mother      Diabetes Father      Diabetes Maternal Grandfather      Diabetes Sister         EXAM:  Vitals: BP (!) 154/68 (BP Location: Left arm)   Pulse 50   Temp 97.3  F (36.3  C) (Oral)   Resp 18   Ht 1.803 m (5' 11\")   Wt 105.7 kg (233 lb)   SpO2 94%   BMI 32.50 kg/m    BMI= Body mass index is 32.5 kg/m .    LABS:   Lab Results   Component Value Date    A1C 6.6 06/03/2022    A1C 6.1 01/03/2022    A1C 6.2 09/14/2021    A1C 6.5 06/22/2021    A1C 8.3 01/08/2021    A1C 6.0 09/09/2020    A1C 7.8 05/12/2020    A1C 8.8 02/04/2020       Lab Results   Component Value Date    INR 1.08 11/07/2019    INR 1.04 08/19/2013       Lab Results   Component Value Date    WBC 5.5 07/23/2022    WBC 6.5 05/10/2021     Lab Results   Component Value Date    HGB 10.3 07/23/2022    HGB 12.1 05/10/2021     Lab Results   Component Value Date     07/23/2022     05/10/2021       All cultures:  Recent Labs   Lab 07/24/22  1128 " 07/24/22  0020 07/22/22  1929 07/22/22  1708   CULTURE 1+ Staphylococcus aureus*  No anaerobic organisms isolated after 1 day No growth after 2 days  No growth after 2 days Positive on the 1st day of incubation*  Staphylococcus hominis* Positive on the 1st day of incubation*  Staphylococcus aureus*     General appearance:    Crispin is alert and fully cooperative with history & exam.  No sign of distress is noted during the visit.      Psychiatric: Affect is pleasant & appropriate.  Crispin is motivated to improve health.       Respiratory: Breathing is regular & unlabored while sitting.      HEENT: Hearing is intact to spoken word.  Speech is clear.  No gross evidence of visual impairment that would impact ambulation.      Dermatologic: Dressing changed on right foot. Minimal sanguinous drainage. No purulence expressed. Nontender. No cellulitis or ascending infection.      Vascular: Dorsalis pedis and posterior tibial pulses are strongly palpable, right foot.      Neurologic: Lower extremity sensation is diminished, bilateral foot, to light touch.  No evidence of neurological-based weakness or contracture in the lower extremities.       Musculoskeletal: Patient is ambulatory without an assistive device or brace.  Digital contractures.  The right hallux has been amputated.  The left hallux and second toes are amputated.    IMAGING:   Impression:  Osteomyelitis of the entirety of the first metatarsal.  *  Pathologic fracture deformity of the distal first metatarsal,  similar to recent radiographs.  *  Rim-enhancing fluid collection compatible with abscess surrounding  the distal first metatarsal measuring up to 4.0 cm.     FLORA VU MD (Joe)     Culture:   Foot, Right; Bone Biopsy          0 Result Notes    Culture 1+ Staphylococcus aureus Abnormal

## 2022-07-26 NOTE — PLAN OF CARE
"INPATIENT NOTE [id number]    Patient alert and oriented, up independently with partial WB on RLE - bedrest with bathroom and in-room ambulation, tolerating diet and PO medications. VSS x- increased Bps, see note for evaluation of restart for PTA meds, denies pain. RLE dressing - no drainage, C/D/I, podiatry redressed. IV abx - vanco and zosyn, ID following. Cultures and biopsy yesterday from I&D. Blood cultures redrawn today. Continued POC, likely discharge home in 1-2 days. May need home abx infusion, TBD. Podiatry, ID following, see notes.    BP (!) 158/75 (BP Location: Left arm)   Pulse 55   Temp 98.1  F (36.7  C) (Oral)   Resp 18   Ht 1.803 m (5' 11\")   Wt 105.7 kg (233 lb)   SpO2 96%   BMI 32.50 kg/m      Carlos Castro RN on 7/26/2022  "

## 2022-07-26 NOTE — PROGRESS NOTES
[facility]  Infectious Disease Progress Note          Assessment and Plan:   IMPRESSION:    1.  A 62-year-old male, well known to me, readmitted with again worsening right great toe infection previously 2 amputations, felt fully resected, but now apparent recurrent infection and at surgery, evidence of soft bone.  Biopsy and cultures are pending.  Previous cultures had mixed josé, prominently Staphylococcus aureus.  3.  Current admission minimal systemic sepsis, but some prominent fatigue over the last several weeks.  One of blood cultures at admission growing sensitive Staphylococcus aureus, presumably coming from the foot.  No evidence of any secondary involved sites, no artificial material, etc.  4.  Prior history of recurrent diabetic foot infection and osteomyelitis that was felt fully resected when last seen in early June.  5.  Prior deep venous thrombosis recently.  6.  Diabetes mellitus.  7.  SULFA ALLERGY, BUT WAS PRIMARILY GI INTOLERANCE.     RECOMMENDATIONS:    1.  Continue current broad antibiotics while awaiting the foot culture. So far all MSSA if same tomorrow to ancef  2.  Serial blood cultures to make sure bacteremia clears. Follow-up neg  3.  We will need extended IV antibiotics, regardless of the foot, status simply because of the bacteremia. Hold on long line  4.  Follow closely for any secondary sites. None apparent  5.  Await foot biopsy and culture to redirect, in particular further evidence of osteo, which clinically sounds like the case from the operative findings. Probably should do further resection. In this case is going to get IV antibiotics anyway.  Probably reasonable from a foot standpoint, almost regardless of how much amputation we do.           Interval History:   no new complaints and doing well; no cp, sob, n/v/d, or abd pain. T down Follow-up cx neg so far, foot staph only so far              Medications:       amLODIPine  5 mg Oral BID     atorvastatin   "40 mg Oral Daily     gabapentin  400 mg Oral TID     insulin aspart  1-7 Units Subcutaneous TID AC     insulin aspart  1-5 Units Subcutaneous At Bedtime     lisinopril  40 mg Oral Daily     [START ON 7/27/2022] pantoprazole  40 mg Oral QAM AC     piperacillin-tazobactam  4.5 g Intravenous Q6H     primidone  50 mg Oral Q8H MELISSA     sodium chloride (PF)  3 mL Intracatheter Q8H     vancomycin  1,250 mg Intravenous Q12H                  Physical Exam:   Blood pressure (!) 158/75, pulse 55, temperature 98.1  F (36.7  C), temperature source Oral, resp. rate 18, height 1.803 m (5' 11\"), weight 105.7 kg (233 lb), SpO2 96 %.  Wt Readings from Last 2 Encounters:   07/22/22 105.7 kg (233 lb)   07/20/22 105.9 kg (233 lb 8 oz)     Vital Signs with Ranges  Temp:  [97.3  F (36.3  C)-98.1  F (36.7  C)] 98.1  F (36.7  C)  Pulse:  [50-55] 55  Resp:  [18] 18  BP: (154-163)/(68-82) 158/75  SpO2:  [94 %-96 %] 96 %    Constitutional: Awake, alert, cooperative, no apparent distress   Lungs: Clear to auscultation bilaterally, no crackles or wheezing   Cardiovascular: Regular rate and rhythm, normal S1 and S2, and no murmur noted   Abdomen: Normal bowel sounds, soft, non-distended, non-tender   Skin: No rashes, no cyanosis, no edema foot wrapped   Other:           Data:   All microbiology laboratory data reviewed.  Recent Labs   Lab Test 07/25/22  0905 07/24/22  0944 07/23/22  0544   WBC 4.7 4.7 5.5   HGB 9.9* 11.4* 10.3*   HCT 32.5* 36.8* 33.1*   MCV 91 90 90    293 275     Recent Labs   Lab Test 07/26/22  0600 07/25/22  0548 07/24/22  0600   CR 1.14 1.10  1.06 1.05  1.06     Recent Labs   Lab Test 07/26/22  1259   SED 93*     Recent Labs   Lab Test 04/20/21  0925 04/20/21  0825 11/07/19  1829 11/07/19  1816 10/31/19  0229 10/31/19  0213 08/02/19  1714 06/24/19 2002 06/24/19 1956   CULT No growth No growth No growth No growth No growth No growth No anaerobes isolated  Light growth  Streptococcus dysgalactiae serogroup C/G  *  " Light growth  Staphylococcus aureus  * No growth No growth

## 2022-07-26 NOTE — PHARMACY-VANCOMYCIN DOSING SERVICE
"Pharmacy Vancomycin Note  Date of Service 2022  Patient's  1960   62 year old, male    Indication: Osteomyelitis  Day of Therapy: 5  Current vancomycin regimen:  1,250 mg IV q12h  Current vancomycin monitoring method: AUC  Current vancomycin therapeutic monitoring goal: 400-600 mg*h/L    [employer]ight[employer] Prediction of Current Vancomycin Regimen  Loading dose: N/A  Regimen: 1250 mg IV every 12 hours.  Start time: 16:08 on 2022  Exposure target: AUC24 (range)400-600 mg/L.hr   AUC24,ss: 532 mg/L.hr  Probability of AUC24 > 400: 98 %  Ctrough,ss: 17.2 mg/L  Probability of Ctrough,ss > 20: 26 %  Probability of nephrotoxicity (Lodise LYNN ): 13 %      Current estimated CrCl = Estimated Creatinine Clearance: 85.3 mL/min (based on SCr of 1.14 mg/dL).    Creatinine for last 3 days  2022:  6:00 AM Creatinine 1.06 mg/dL;  6:00 AM Creatinine 1.05 mg/dL  2022:  5:48 AM Creatinine 1.06 mg/dL;  5:48 AM Creatinine 1.10 mg/dL  2022:  6:00 AM Creatinine 1.14 mg/dL    Recent Vancomycin Levels (past 3 days)  2022:  3:49 PM Vancomycin 12.8 mg/L  2022: 12:59 PM Vancomycin 19.4 ug/mL    Vancomycin IV Administrations (past 72 hours)                   vancomycin 1250 mg in 0.9% NaCl 250 mL intermittent infusion 1,250 mg (mg) 1,250 mg New Bag 22 0614     1,250 mg New Bag 22 1812     1,250 mg New Bag  0648     1,250 mg New Bag 22 1731     1,250 mg New Bag  0451     1,250 mg New Bag 22 1547                Nephrotoxins and other renal medications (From now, onward)    Start     Dose/Rate Route Frequency Ordered Stop    22 1400  lisinopril (ZESTRIL) tablet 40 mg        Note to Pharmacy: PTA Sig:Take 1 tablet (40 mg) by mouth daily      40 mg Oral DAILY 22 1343      22  piperacillin-tazobactam (ZOSYN) intermittent infusion 4.5 g        Note to Pharmacy: For [facility], [facility] and [facility]: For Zosyn-naive patients, use the \"Zosyn initial dose + extended infusion\" order " panel.    4.5 g  200 mL/hr over 30 Minutes Intravenous EVERY 6 HOURS 07/23/22 1654      07/23/22 1430  vancomycin 1250 mg in 0.9% NaCl 250 mL intermittent infusion 1,250 mg         1,250 mg  over 90 Minutes Intravenous EVERY 12 HOURS 07/23/22 1404               Contrast Orders - past 72 hours (72h ago, onward)    None          Interpretation of levels and current regimen:  Vancomycin level is reflective of -600    Has serum creatinine changed greater than 50% in last 72 hours: No    Urine output:  unable to determine    Renal Function: Stable    InsightRX Prediction of Planned New Vancomycin Regimen  Loading dose: N/A  Regimen: 1250 mg IV every 12 hours.  Start time: 18:14 on 07/26/2022  Exposure target: AUC24 (range)400-600 mg/L.hr   AUC24,ss: 531 mg/L.hr  Probability of AUC24 > 400: 98 %  Ctrough,ss: 17.2 mg/L  Probability of Ctrough,ss > 20: 25 %  Probability of nephrotoxicity (Lodise LYNN 2009): 13 %      Plan:  1. Continue Current Dose  2. Vancomycin monitoring method: AUC  3. Vancomycin therapeutic monitoring goal: 400-600 mg*h/L  4. Pharmacy will check vancomycin levels as appropriate in 1-3 Days.  5. Serum creatinine levels will be ordered daily for the first week of therapy and at least twice weekly for subsequent weeks.    Misa Hensley [facility]

## 2022-07-27 LAB
ANION GAP SERPL CALCULATED.3IONS-SCNC: 10 MMOL/L (ref 7–15)
APTT PPP: 42 SECONDS (ref 22–38)
APTT PPP: 44 SECONDS (ref 22–38)
APTT PPP: 57 SECONDS (ref 22–38)
APTT PPP: 59 SECONDS (ref 22–38)
BACTERIA BONE ANAEROBE+AEROBE CULT: ABNORMAL
BUN SERPL-MCNC: 13.1 MG/DL (ref 8–23)
CALCIUM SERPL-MCNC: 8.8 MG/DL (ref 8.8–10.2)
CHLORIDE SERPL-SCNC: 106 MMOL/L (ref 98–107)
CREAT SERPL-MCNC: 1.04 MG/DL (ref 0.67–1.17)
CRP SERPL-MCNC: 15.65 MG/L
DEPRECATED HCO3 PLAS-SCNC: 24 MMOL/L (ref 22–29)
ERYTHROCYTE [DISTWIDTH] IN BLOOD BY AUTOMATED COUNT: 13.2 % (ref 10–15)
GFR SERPL CREATININE-BSD FRML MDRD: 82 ML/MIN/1.73M2
GLUCOSE BLDC GLUCOMTR-MCNC: 122 MG/DL (ref 70–99)
GLUCOSE BLDC GLUCOMTR-MCNC: 122 MG/DL (ref 70–99)
GLUCOSE BLDC GLUCOMTR-MCNC: 84 MG/DL (ref 70–99)
GLUCOSE BLDC GLUCOMTR-MCNC: 87 MG/DL (ref 70–99)
GLUCOSE BLDC GLUCOMTR-MCNC: 92 MG/DL (ref 70–99)
GLUCOSE BLDC GLUCOMTR-MCNC: 93 MG/DL (ref 70–99)
GLUCOSE SERPL-MCNC: 99 MG/DL (ref 70–99)
HCT VFR BLD AUTO: 34.6 % (ref 40–53)
HGB BLD-MCNC: 10.6 G/DL (ref 13.3–17.7)
HOLD SPECIMEN: NORMAL
MCH RBC QN AUTO: 27.5 PG (ref 26.5–33)
MCHC RBC AUTO-ENTMCNC: 30.6 G/DL (ref 31.5–36.5)
MCV RBC AUTO: 90 FL (ref 78–100)
PLATELET # BLD AUTO: 229 10E3/UL (ref 150–450)
POTASSIUM SERPL-SCNC: 3.9 MMOL/L (ref 3.4–5.3)
RBC # BLD AUTO: 3.85 10E6/UL (ref 4.4–5.9)
SODIUM SERPL-SCNC: 140 MMOL/L (ref 136–145)
WBC # BLD AUTO: 4.4 10E3/UL (ref 4–11)

## 2022-07-27 PROCEDURE — 120N000001 HC R&B MED SURG/OB

## 2022-07-27 PROCEDURE — 99232 SBSQ HOSP IP/OBS MODERATE 35: CPT | Performed by: INTERNAL MEDICINE

## 2022-07-27 PROCEDURE — 85730 THROMBOPLASTIN TIME PARTIAL: CPT | Performed by: PHYSICIAN ASSISTANT

## 2022-07-27 PROCEDURE — 250N000011 HC RX IP 250 OP 636: Performed by: PHYSICIAN ASSISTANT

## 2022-07-27 PROCEDURE — 250N000013 HC RX MED GY IP 250 OP 250 PS 637: Performed by: PHYSICIAN ASSISTANT

## 2022-07-27 PROCEDURE — 36415 COLL VENOUS BLD VENIPUNCTURE: CPT | Performed by: PHYSICIAN ASSISTANT

## 2022-07-27 PROCEDURE — 36415 COLL VENOUS BLD VENIPUNCTURE: CPT | Performed by: PODIATRIST

## 2022-07-27 PROCEDURE — 99232 SBSQ HOSP IP/OBS MODERATE 35: CPT | Performed by: PHYSICIAN ASSISTANT

## 2022-07-27 PROCEDURE — 86140 C-REACTIVE PROTEIN: CPT | Performed by: PHYSICIAN ASSISTANT

## 2022-07-27 PROCEDURE — 258N000003 HC RX IP 258 OP 636: Performed by: PODIATRIST

## 2022-07-27 PROCEDURE — 87040 BLOOD CULTURE FOR BACTERIA: CPT | Performed by: PHYSICIAN ASSISTANT

## 2022-07-27 PROCEDURE — 80048 BASIC METABOLIC PNL TOTAL CA: CPT | Performed by: PHYSICIAN ASSISTANT

## 2022-07-27 PROCEDURE — 250N000011 HC RX IP 250 OP 636: Performed by: PODIATRIST

## 2022-07-27 PROCEDURE — 85730 THROMBOPLASTIN TIME PARTIAL: CPT | Performed by: PODIATRIST

## 2022-07-27 PROCEDURE — 99233 SBSQ HOSP IP/OBS HIGH 50: CPT | Mod: GC | Performed by: PODIATRIST

## 2022-07-27 PROCEDURE — 250N000011 HC RX IP 250 OP 636: Performed by: INTERNAL MEDICINE

## 2022-07-27 PROCEDURE — 85027 COMPLETE CBC AUTOMATED: CPT | Performed by: PHYSICIAN ASSISTANT

## 2022-07-27 RX ORDER — CEFAZOLIN SODIUM 2 G/100ML
2 INJECTION, SOLUTION INTRAVENOUS EVERY 8 HOURS
Status: DISCONTINUED | OUTPATIENT
Start: 2022-07-27 | End: 2022-08-01 | Stop reason: HOSPADM

## 2022-07-27 RX ADMIN — GABAPENTIN 400 MG: 400 CAPSULE ORAL at 08:49

## 2022-07-27 RX ADMIN — AMLODIPINE BESYLATE 5 MG: 5 TABLET ORAL at 21:10

## 2022-07-27 RX ADMIN — TAZOBACTAM SODIUM AND PIPERACILLIN SODIUM 4.5 G: 500; 4 INJECTION, SOLUTION INTRAVENOUS at 03:48

## 2022-07-27 RX ADMIN — HEPARIN SODIUM AND DEXTROSE 1350 UNITS/HR: 10000; 5 INJECTION INTRAVENOUS at 09:28

## 2022-07-27 RX ADMIN — CEFAZOLIN SODIUM 2 G: 2 INJECTION, SOLUTION INTRAVENOUS at 13:55

## 2022-07-27 RX ADMIN — CEFAZOLIN SODIUM 2 G: 2 INJECTION, SOLUTION INTRAVENOUS at 22:25

## 2022-07-27 RX ADMIN — HEPARIN SODIUM AND DEXTROSE 1500 UNITS/HR: 10000; 5 INJECTION INTRAVENOUS at 18:15

## 2022-07-27 RX ADMIN — HEPARIN SODIUM AND DEXTROSE 1350 UNITS/HR: 10000; 5 INJECTION INTRAVENOUS at 03:47

## 2022-07-27 RX ADMIN — VANCOMYCIN HYDROCHLORIDE 1250 MG: 10 INJECTION, POWDER, LYOPHILIZED, FOR SOLUTION INTRAVENOUS at 05:37

## 2022-07-27 RX ADMIN — GABAPENTIN 400 MG: 400 CAPSULE ORAL at 21:10

## 2022-07-27 RX ADMIN — LISINOPRIL 40 MG: 40 TABLET ORAL at 08:49

## 2022-07-27 RX ADMIN — TAZOBACTAM SODIUM AND PIPERACILLIN SODIUM 4.5 G: 500; 4 INJECTION, SOLUTION INTRAVENOUS at 11:14

## 2022-07-27 RX ADMIN — PANTOPRAZOLE SODIUM 40 MG: 40 TABLET, DELAYED RELEASE ORAL at 08:49

## 2022-07-27 RX ADMIN — AMLODIPINE BESYLATE 5 MG: 5 TABLET ORAL at 08:49

## 2022-07-27 RX ADMIN — GABAPENTIN 400 MG: 400 CAPSULE ORAL at 13:55

## 2022-07-27 RX ADMIN — ATORVASTATIN CALCIUM 40 MG: 40 TABLET, FILM COATED ORAL at 08:48

## 2022-07-27 ASSESSMENT — ACTIVITIES OF DAILY LIVING (ADL)
ADLS_ACUITY_SCORE: 21

## 2022-07-27 NOTE — PROGRESS NOTES
[facility]  Infectious Disease Progress Note          Assessment and Plan:   IMPRESSION:    1.  A 62-year-old male, well known to me, readmitted with again worsening right great toe infection previously 2 amputations, felt fully resected, but now apparent recurrent infection and at surgery, evidence of soft bone.  Biopsy and cultures are pending.  Previous cultures had mixed josé, prominently Staphylococcus aureus.  3.  Current admission minimal systemic sepsis, but some prominent fatigue over the last several weeks.  One of blood cultures at admission growing sensitive Staphylococcus aureus, presumably coming from the foot.  No evidence of any secondary involved sites, no artificial material, etc.  4.  Prior history of recurrent diabetic foot infection and osteomyelitis that was felt fully resected when last seen in early June.  5.  Prior deep venous thrombosis recently.  6.  Diabetes mellitus.  7.  SULFA ALLERGY, BUT WAS PRIMARILY GI INTOLERANCE.     RECOMMENDATIONS:    1.  With MSSA only to  to ancef  2.  Serial blood cultures to make sure bacteremia clears. Follow-up neg  3.  We will need extended IV antibiotics, regardless of the foot, status simply because of the bacteremia. Hold on long line 1 day more then midline  4.  Follow closely for any secondary sites. None apparent  5. +biopsy and culture MSSA only, in particular further evidence of osteo, which clinically sounds like the case from the operative findings. Plan is  further resection. In this case is going to get IV antibiotics anyway.  Probably reasonable from a foot standpoint, almost regardless of how much amputation we do.             Interval History:   no new complaints and doing well; no cp, sob, n/v/d, or abd pain. T down Follow-up cx neg so far, foot staph only so far path +              Medications:       amLODIPine  5 mg Oral BID     atorvastatin  40 mg Oral Daily     ceFAZolin  2 g Intravenous Q8H     gabapentin  400  "mg Oral TID     insulin aspart  1-7 Units Subcutaneous TID AC     insulin aspart  1-5 Units Subcutaneous At Bedtime     [Held by provider] lisinopril  40 mg Oral Daily     pantoprazole  40 mg Oral QAM AC     primidone  50 mg Oral Q8H MELISSA     sodium chloride (PF)  3 mL Intracatheter Q8H                  Physical Exam:   Blood pressure (!) 145/64, pulse 56, temperature 97.7  F (36.5  C), temperature source Oral, resp. rate 16, height 1.803 m (5' 11\"), weight 105.7 kg (233 lb), SpO2 98 %.  Wt Readings from Last 2 Encounters:   07/22/22 105.7 kg (233 lb)   07/20/22 105.9 kg (233 lb 8 oz)     Vital Signs with Ranges  Temp:  [97.7  F (36.5  C)-98.7  F (37.1  C)] 97.7  F (36.5  C)  Pulse:  [51-60] 56  Resp:  [16-18] 16  BP: (140-158)/(64-73) 145/64  SpO2:  [94 %-99 %] 98 %    Constitutional: Awake, alert, cooperative, no apparent distress   Lungs: Clear to auscultation bilaterally, no crackles or wheezing   Cardiovascular: Regular rate and rhythm, normal S1 and S2, and no murmur noted   Abdomen: Normal bowel sounds, soft, non-distended, non-tender   Skin: No rashes, no cyanosis, no edema foot wrapped   Other:           Data:   All microbiology laboratory data reviewed.  Recent Labs   Lab Test 07/27/22  0909 07/25/22  0905 07/24/22  0944   WBC 4.4 4.7 4.7   HGB 10.6* 9.9* 11.4*   HCT 34.6* 32.5* 36.8*   MCV 90 91 90    237 293     Recent Labs   Lab Test 07/27/22  0909 07/26/22  0600 07/25/22  0548   CR 1.04 1.14 1.10  1.06     Recent Labs   Lab Test 07/26/22  1259   SED 93*     Recent Labs   Lab Test 04/20/21  0925 04/20/21  0825 11/07/19  1829 11/07/19  1816 10/31/19  0229 10/31/19  0213 08/02/19  1714 06/24/19 2002 06/24/19  1956   CULT No growth No growth No growth No growth No growth No growth No anaerobes isolated  Light growth  Streptococcus dysgalactiae serogroup C/G  *  Light growth  Staphylococcus aureus  * No growth No growth       "

## 2022-07-27 NOTE — PROGRESS NOTES
[facility]  Internal Medicine  Progress Note    Date of Service: 7/27/2022    Patient: Crispin Rojas  MRN: [id number]  Admission Date: 7/22/2022      Assessment & Plan: Crispin Rojas is a 62 year old male with past medical history significant for type 2 diabetes mellitus, hypertension, hyperlipidemia, CVA, GILA, obesity, PVD, and recent right great toe amputation 06/2022 with persistent nonhealing surgical wound who presented to [facility] on 7/22/2022 with increased right foot pain and radiographic evidence of continued osteomyelitis with associated pathologic fracture and acute abscess.     Right Foot Ulcer with Osteomyelitis  POD #3 I/D right foot with bone biopsy of the right first metatarsal   Staph Aureus Bacteremia - pt with a recent right great toe amputation 06/2022 with persistent nonhealing surgical wound who presented to [facility] on 7/22/2022 with increased right foot pain and radiographic evidence of continued osteomyelitis with associated pathologic fracture and acute abscess.  Blood cultures positive on 7/22 for Staph Aureus and Staph Hominis.  Bone bx cx 7/24 positive for Staph Aureus  - Podiatry and ID following  - planning to return to the OR on 7/28/2022.  Npo after midnight and hold heparin gtt at 0430  - follow serial blood cultures  - ID changed antibiotics to Ancef today.  Discontinued Zosyn and Vancomycin   - PT consult; ok to see after surgery on 7/28  - SW consulted for discharge planning     Type 2 Diabetes Mellitus  Peripheral Neuropathy - diabetes diagnosed in 1990.  Hgb A1C 6.6 (6/2022).  BS have been .  Pt with a poor appetite.     - hold pta Metformin, Ozempic, home sliding scale insulin  - pt has not taken his pta Tresiba for the past one week due to poor appetite and held on admission due to npo status for surgery.  Continue to hold as BS have only been 90-100s  - continue pta Gabapentin  - continue ISS protocol     Hx RLE DVT -  "(6/3/2022) RLE Doppler U/S revealed a subocclusive DVT within the right posterior tibial vein.  Likely provoked in the setting of right toe infection at that time.  Discharged on Eliquis with plan for 3 months of therapy. Eliquis held on this admission due to surgery.  RLE Doppler U/S on 7/26 revealed a new DVT in one of the paired right gastrocnemius veins and resolution of DVT in the posterior tibial vein.   - started on a heparin gtt on 7/26; continue and plan to hold 8 hours prior to surgery (hold at 0430 on 7/28)     Chronic Anemia - hgb 10.6, baseline 11.  Monitor     HTN - continue pta Amlodipine and Lisinopril.  Holding prn Lasix and Hctz.     GILA - patient does not tolerate cpap.  Symptoms improved after weight loss.  Monitor     GERD - continue pta Omeprazole     Peripheral Vascular Disease - holding pta ASA.  Continue Atorvastatin     Hx CVA - 2010.  No residual deficits other than difficulty with remembering faces/names.  Holding ASA prior to surgery.  Continue Atorvastatin.     Essential Tremor - resume pta Primidone     Obesity, BMI 32     CODE: full  DVT: heparin  Diet/fluids: regular, npo after midnight      Maria G Hernandez MS PA-C  Hospitalist Physician Assistant  [facility]      Subjective & Interval Hx:    Pain is controlled.  Still with a poor appetite.  Denies chest pain, shortness of breath, cough, abdominal pain, nausea, emesis.  Just talked with Podiatry and planning for surgery tomorrow.    Last 24 hr care team notes reviewed.   ROS:  4 point ROS including Respiratory, CV, GI and , other than that noted in the HPI, is negative.    Physical Exam:    Blood pressure (!) 145/64, pulse 56, temperature 97.7  F (36.5  C), temperature source Oral, resp. rate 16, height 1.803 m (5' 11\"), weight 105.7 kg (233 lb), SpO2 98 %. on room air  General: Alert, interactive, NAD  HEENT: AT/NC  Resp: clear to auscultation bilaterally, no crackles or wheezes  Cardiac: regular rate and rhythm, no " murmur  Abdomen: Soft, nontender, nondistended. +BS.  No HSM or masses, no rebound or guarding.  Extremities: No LE edema.  Right foot wrapped, dressing c/d/i  Skin: Warm and dry  Neuro: Alert & oriented x 3, Cns 2-12 intact, moves all extremities equally    Labs & Images:  Reviewed in Epic   Medications:    Current Facility-Administered Medications   Medication     acetaminophen (TYLENOL) tablet 650 mg    Or     acetaminophen (TYLENOL) Suppository 650 mg     amLODIPine (NORVASC) tablet 5 mg     atorvastatin (LIPITOR) tablet 40 mg     glucose gel 15-30 g    Or     dextrose 50 % injection 25-50 mL    Or     glucagon injection 1 mg     gabapentin (NEURONTIN) capsule 400 mg     heparin infusion 25,000 units in D5W 250 mL ANTICOAGULANT     hydrALAZINE (APRESOLINE) injection 10 mg     insulin aspart (NovoLOG) injection (RAPID ACTING)     insulin aspart (NovoLOG) injection (RAPID ACTING)     lidocaine (LMX4) cream     lidocaine 1 % 0.1-1 mL     lisinopril (ZESTRIL) tablet 40 mg     melatonin tablet 1 mg     naloxone (NARCAN) injection 0.2 mg    Or     naloxone (NARCAN) injection 0.4 mg    Or     naloxone (NARCAN) injection 0.2 mg    Or     naloxone (NARCAN) injection 0.4 mg     ondansetron (ZOFRAN ODT) ODT tab 4 mg    Or     ondansetron (ZOFRAN) injection 4 mg     oxyCODONE (ROXICODONE) tablet 5 mg     pantoprazole (PROTONIX) EC tablet 40 mg     piperacillin-tazobactam (ZOSYN) intermittent infusion 4.5 g     primidone (MYSOLINE) tablet 50 mg     prochlorperazine (COMPAZINE) injection 10 mg    Or     prochlorperazine (COMPAZINE) tablet 10 mg    Or     prochlorperazine (COMPAZINE) suppository 25 mg     senna-docusate (SENOKOT-S/PERICOLACE) 8.6-50 MG per tablet 1 tablet    Or     senna-docusate (SENOKOT-S/PERICOLACE) 8.6-50 MG per tablet 2 tablet     sodium chloride (PF) 0.9% PF flush 3 mL     sodium chloride (PF) 0.9% PF flush 3 mL     vancomycin 1250 mg in 0.9% NaCl 250 mL intermittent infusion 1,250 mg

## 2022-07-27 NOTE — PLAN OF CARE
"Care from [id number]    Inpatient Progress Note:  For complete assessment see flow sheet documentation.    BP (!) 153/73 (BP Location: Left arm)   Pulse 51   Temp 98.7  F (37.1  C) (Oral)   Resp 16   Ht 1.803 m (5' 11\")   Wt 105.7 kg (233 lb)   SpO2 97%   BMI 32.50 kg/m         Orientation: A & O x 4  Neuro: WNL  Pain status: Denies pain  Activity: Bedrest with bathroom privileges  Resp: Lungs clear, no SOB  Cardiac: WNL  GI: Audible bowel sounds, passing gas.  :  Voiding spontaneously  Skin: WNL excepts right foot dressing, CDI  LDA: Peripheral  Infusions: IV Zosyn, Vanco & heprarin  Pertinent Labs: PTT, K protocol  Diet: Regular  Consults: ID, awaiting foot cultures  Discharge Plan: TBD   Goal Outcome Evaluation:                      "

## 2022-07-27 NOTE — PROGRESS NOTES
[facility]  PROGRESS NOTE       ASSESSMENT:  1. Right foot ulcer with osteomyelitis to the first metatarsal s/p recent amputation on 6/5, now s/p right foot I&D and bone biopsy on 7/24/22  2. Bacteremia --> Staph aureus on 7/22     PLAN:   -Patient seen at bedside. Continued discussion regarding treatment plan.   -Recent pathology results confirm, chronic osteomyelitis. Given this and all other results, scheduled for partial resection of first metatarsal tomorrow at approx 1230. Patient prefers as little bone removed, as able to maintain function of foot. Will plan partial resection with likely abx implantation and proximal specimen, to be treated with IV abx.   -NPO after midnight   -Cont IV abx  -WB to heel of right foot  -Will hold heparin 8 hours preop. Order placed to be held starting at 0430.     Tiny Soto DPM  [facility] Department of Podiatry/Foot & Ankle Surgery  [phone]      ____________________________________________________________________________________________________  CHIEF COMPLAINT:      I was asked by Albaro Sandoval DO to evaluate this patient, Crispin Rojas, for suspected right foot abscess and osteomyelitis.    PATIENT HISTORY:  Crispin Rojas is a 62-year-old male seen in follow up for his right foot. No complaints. Denies N/F/V/C/D. Had repeat venous US yesterday.     Review of Systems:  A 10 point review of systems was performed and is positive for that noted above in the patient history.  All other areas are negative.     PAST MEDICAL HISTORY:   Past Medical History:   Diagnosis Date     Cerebral infarction (H)     2010     Chronic infection      H/O blood clots 2022     Hyperlipidemia LDL goal <70      Hypertension      Numbness and tingling      Obesity      GILA (obstructive sleep apnea) 10/22/2018    CPAP intolerant     PVD (peripheral vascular disease) (H)     s/p left partial toe amputation     Renal stone      Tremor      Type 2  diabetes mellitus with diabetic polyneuropathy, with long-term current use of insulin (H)         PAST SURGICAL HISTORY:   Past Surgical History:   Procedure Laterality Date     AMPUTATE FOOT Left 8/18/2016    Procedure: AMPUTATE FOOT;  Surgeon: Jack Younger DPM;  Location: [facility] OR     AMPUTATE TOE(S) Right 2/1/2016    Procedure: AMPUTATE TOE(S);  Surgeon: Rachelle Manriquez DPM, Pod;  Location: [facility] OR     AMPUTATE TOE(S) Right 8/2/2019    Procedure: Right fourth toe partial amputation for treatment of osteomyelitis.;  Surgeon: Jack Younger DPM;  Location: [facility] OR     AMPUTATE TOE(S) Left 11/8/2019    Procedure: Left second toe amputation at the metatarsophalangeal joint.;  Surgeon: Rachelle Manriquez DPM, Podiatry/Foot and Ankle Surgery;  Location: [facility] OR     AMPUTATE TOE(S) Right 6/5/2022    Procedure: AMPUTATION OF RIGHT GREAT TOE;  Surgeon: Wili Quiles DPM;  Location: [facility] OR     ANGIOGRAM       BIOPSY BONE FOOT Right 7/24/2022    Procedure: Bone biopsy, right first metatarsal.;  Surgeon: Valentino Molina DPM;  Location: [facility] OR     COLONOSCOPY       COMBINED CYSTOSCOPY, RETROGRADES, URETEROSCOPY, INSERT STENT Left 8/21/2016    Procedure: COMBINED CYSTOSCOPY, RETROGRADES, URETEROSCOPY, INSERT STENT;  Surgeon: Artemio Valenzuela MD;  Location: [facility] OR     COMBINED CYSTOSCOPY, RETROGRADES, URETEROSCOPY, LASER HOLMIUM LITHOTRIPSY URETER(S), INSERT STENT Left 10/3/2016    Procedure: COMBINED CYSTOSCOPY, RETROGRADES, URETEROSCOPY, LASER HOLMIUM LITHOTRIPSY URETER(S), INSERT STENT;  Surgeon: Artemio Valenzuela MD;  Location: [facility] OR     EXTRACORPOREAL SHOCK WAVE LITHOTRIPSY (ESWL) Left 9/8/2016    Procedure: EXTRACORPOREAL SHOCK WAVE LITHOTRIPSY (ESWL);  Surgeon: Artemio Valenzuela MD;  Location: [facility] OR     INCISION AND DRAINAGE FOOT, COMBINED Right 7/24/2022    Procedure: Incision and drainage, right foot ;  Surgeon: Valentino Molina DPM;  Location: [facility]     RECESSION GASTROCNEMIUS Right  2/1/2016    Procedure: RECESSION GASTROCNEMIUS;  Surgeon: Rachelle Manriquez DPM, Pod;  Location: [facility] OR        MEDICATIONS:  Reviewed in Epic.     ALLERGIES:    Allergies   Allergen Reactions     Bactrim [Sulfamethoxazole W/Trimethoprim] Nausea and Vomiting     Sulfa Drugs Nausea     Niacin Swelling     SIMCOR caused edema in extremities     Simvastatin Swelling     SIMCOR caused edema in extremities        SOCIAL HISTORY:   Social History     Socioeconomic History     Marital status:      Spouse name: Sydney     Number of children: 2     Years of education: Not on file     Highest education level: Master's degree (e.g., MA, MS, Arle[place], MEd, MSW, ELIANA)   Occupational History     Occupation: marketing     Employer: [employer]     Comment: "[employer]"   Tobacco Use     Smoking status: Never Smoker     Smokeless tobacco: Never Used   Vaping Use     Vaping Use: Never used   Substance and Sexual Activity     Alcohol use: Yes     Alcohol/week: 0.0 standard drinks     Comment: rare---red wine 3x per month     Drug use: No     Sexual activity: Not Currently     Partners: Female   Other Topics Concern     Parent/sibling w/ CABG, MI or angioplasty before 65F 55M? No   Social History Narrative     Not on file     Social Determinants of Health     Financial Resource Strain: Low Risk      Difficulty of Paying Living Expenses: Not very hard   Food Insecurity: No Food Insecurity     Worried About Running Out of Food in the Last Year: Never true     Ran Out of Food in the Last Year: Never true   Transportation Needs: No Transportation Needs     Lack of Transportation (Medical): No     Lack of Transportation (Non-Medical): No   Physical Activity: Sufficiently Active     Days of Exercise per Week: 4 days     Minutes of Exercise per Session: 40 min   Stress: No Stress Concern Present     Feeling of Stress : Not at all   Social Connections: Moderately Isolated     Frequency of Communication with Friends and Family: Once a week      "Frequency of Social Gatherings with Friends and Family: Never     Attends Islam Services: More than 4 times per year     Active Member of Clubs or Organizations: No     Attends Club or Organization Meetings: Not on file     Marital Status:    Intimate Partner Violence: Not on file   Housing Stability: Low Risk      Unable to Pay for Housing in the Last Year: No     Number of Places Lived in the Last Year: 2     Unstable Housing in the Last Year: No        FAMILY HISTORY:   Family History   Problem Relation Age of Onset     Arthritis Mother         SLE     Connective Tissue Disorder Mother         lupus     Diabetes Mother      Cerebrovascular Disease Mother      Cancer Mother      Diabetes Father      Diabetes Maternal Grandfather      Diabetes Sister         EXAM:  Vitals: BP (!) 145/64 (BP Location: Left arm, Patient Position: Supine, Cuff Size: Adult Regular)   Pulse 56   Temp 97.7  F (36.5  C) (Oral)   Resp 16   Ht 1.803 m (5' 11\")   Wt 105.7 kg (233 lb)   SpO2 98%   BMI 32.50 kg/m    BMI= Body mass index is 32.5 kg/m .    LABS:   Lab Results   Component Value Date    A1C 6.6 06/03/2022    A1C 6.1 01/03/2022    A1C 6.2 09/14/2021    A1C 6.5 06/22/2021    A1C 8.3 01/08/2021    A1C 6.0 09/09/2020    A1C 7.8 05/12/2020    A1C 8.8 02/04/2020       Lab Results   Component Value Date    INR 1.08 11/07/2019    INR 1.04 08/19/2013       Lab Results   Component Value Date    WBC 5.5 07/23/2022    WBC 6.5 05/10/2021     Lab Results   Component Value Date    HGB 10.3 07/23/2022    HGB 12.1 05/10/2021     Lab Results   Component Value Date     07/23/2022     05/10/2021       All cultures:  Recent Labs   Lab 07/26/22  1341 07/26/22  1339 07/24/22  1128 07/24/22  0020 07/22/22  1929 07/22/22  1708   CULTURE No growth after 12 hours No growth after 12 hours 1+ Staphylococcus aureus*  No anaerobic organisms isolated after 2 days No growth after 3 days  No growth after 3 days Positive on the 1st " day of incubation*  Staphylococcus hominis* Positive on the 1st day of incubation*  Staphylococcus aureus*     General appearance:    Crispin is alert and fully cooperative with history & exam.  No sign of distress is noted during the visit.      Psychiatric: Affect is pleasant & appropriate.  Crispin is motivated to improve health.       Respiratory: Breathing is regular & unlabored while sitting.      HEENT: Hearing is intact to spoken word.  Speech is clear.  No gross evidence of visual impairment that would impact ambulation.      Dermatologic: Dressing changed on right foot. Minimal sanguinous drainage. No purulence expressed. Nontender. No cellulitis or ascending infection.      Vascular: Dorsalis pedis and posterior tibial pulses are strongly palpable, right foot.      Neurologic: Lower extremity sensation is diminished, bilateral foot, to light touch.  No evidence of neurological-based weakness or contracture in the lower extremities.       Musculoskeletal: Patient is ambulatory without an assistive device or brace.  Digital contractures.  The right hallux has been amputated.  The left hallux and second toes are amputated.    IMAGING:   Impression:  Osteomyelitis of the entirety of the first metatarsal.  *  Pathologic fracture deformity of the distal first metatarsal,  similar to recent radiographs.  *  Rim-enhancing fluid collection compatible with abscess surrounding  the distal first metatarsal measuring up to 4.0 cm.     FLORA VU MD (Joe)     Culture:   Foot, Right; Bone Biopsy          0 Result Notes    Culture 1+ Staphylococcus aureus Abnormal            Pathology:   Final Diagnosis   Bone, right first metatarsal, biopsy:  - Consistent with chronic osteomyelitis.  - Negative for malignancy.  - Microbiology studies from this procedure will be reported separately by the performing laboratory; correlation with those results is recommended.

## 2022-07-27 NOTE — PLAN OF CARE
"    BP (!) 145/64 (BP Location: Left arm, Patient Position: Supine, Cuff Size: Adult Regular)   Pulse 56   Temp 97.7  F (36.5  C) (Oral)   Resp 16   Ht 1.803 m (5' 11\")   Wt 105.7 kg (233 lb)   SpO2 98%   BMI 32.50 kg/m      A&O x4, patient denies pain. Bedrest with bathroom privileges; indepenednt. Patient has no BM since Sunday; stated its normal for him because he is not eating. IV X2. Pt on ANCEF, ID following. CMS intact on RLE. Plan for surgery tomorrow with podiatry at 12:40 pm; so NPO at midnight, heparin drip to be stopped at 0430. PT to see after. Will continue to monitor and provide supportive care.            "

## 2022-07-28 ENCOUNTER — APPOINTMENT (OUTPATIENT)
Dept: GENERAL RADIOLOGY | Facility: CLINIC | Age: 62
End: 2022-07-28
Attending: PODIATRIST
Payer: COMMERCIAL

## 2022-07-28 ENCOUNTER — HOME INFUSION (PRE-WILLOW HOME INFUSION) (OUTPATIENT)
Dept: PHARMACY | Facility: CLINIC | Age: 62
End: 2022-07-28

## 2022-07-28 ENCOUNTER — ANESTHESIA (OUTPATIENT)
Dept: SURGERY | Facility: CLINIC | Age: 62
End: 2022-07-28
Payer: COMMERCIAL

## 2022-07-28 ENCOUNTER — ANESTHESIA EVENT (OUTPATIENT)
Dept: SURGERY | Facility: CLINIC | Age: 62
End: 2022-07-28
Payer: COMMERCIAL

## 2022-07-28 ENCOUNTER — APPOINTMENT (OUTPATIENT)
Dept: GENERAL RADIOLOGY | Facility: CLINIC | Age: 62
End: 2022-07-28
Attending: STUDENT IN AN ORGANIZED HEALTH CARE EDUCATION/TRAINING PROGRAM
Payer: COMMERCIAL

## 2022-07-28 LAB
APTT PPP: 39 SECONDS (ref 22–38)
APTT PPP: 68 SECONDS (ref 22–38)
GLUCOSE BLDC GLUCOMTR-MCNC: 103 MG/DL (ref 70–99)
GLUCOSE BLDC GLUCOMTR-MCNC: 108 MG/DL (ref 70–99)
GLUCOSE BLDC GLUCOMTR-MCNC: 114 MG/DL (ref 70–99)
GLUCOSE BLDC GLUCOMTR-MCNC: 128 MG/DL (ref 70–99)
GLUCOSE BLDC GLUCOMTR-MCNC: 128 MG/DL (ref 70–99)
GLUCOSE BLDC GLUCOMTR-MCNC: 74 MG/DL (ref 70–99)
GLUCOSE BLDC GLUCOMTR-MCNC: 94 MG/DL (ref 70–99)
GRAM STAIN RESULT: ABNORMAL
GRAM STAIN RESULT: ABNORMAL

## 2022-07-28 PROCEDURE — 272N000002 HC OR SUPPLY OTHER OPNP: Performed by: PODIATRIST

## 2022-07-28 PROCEDURE — 36415 COLL VENOUS BLD VENIPUNCTURE: CPT | Performed by: INTERNAL MEDICINE

## 2022-07-28 PROCEDURE — 87077 CULTURE AEROBIC IDENTIFY: CPT | Performed by: PODIATRIST

## 2022-07-28 PROCEDURE — 250N000009 HC RX 250: Performed by: NURSE ANESTHETIST, CERTIFIED REGISTERED

## 2022-07-28 PROCEDURE — 272N000001 HC OR GENERAL SUPPLY STERILE: Performed by: PODIATRIST

## 2022-07-28 PROCEDURE — 250N000011 HC RX IP 250 OP 636: Performed by: PODIATRIST

## 2022-07-28 PROCEDURE — 88305 TISSUE EXAM BY PATHOLOGIST: CPT | Mod: 26 | Performed by: PATHOLOGY

## 2022-07-28 PROCEDURE — 999N000065 XR FOOT PORT RIGHT 3 VIEWS: Mod: RT

## 2022-07-28 PROCEDURE — 250N000011 HC RX IP 250 OP 636: Performed by: INTERNAL MEDICINE

## 2022-07-28 PROCEDURE — 99232 SBSQ HOSP IP/OBS MODERATE 35: CPT | Performed by: INTERNAL MEDICINE

## 2022-07-28 PROCEDURE — 999N000141 HC STATISTIC PRE-PROCEDURE NURSING ASSESSMENT: Performed by: PODIATRIST

## 2022-07-28 PROCEDURE — 360N000083 HC SURGERY LEVEL 3 W/ FLUORO, PER MIN: Performed by: PODIATRIST

## 2022-07-28 PROCEDURE — 120N000001 HC R&B MED SURG/OB

## 2022-07-28 PROCEDURE — 87075 CULTR BACTERIA EXCEPT BLOOD: CPT | Performed by: PODIATRIST

## 2022-07-28 PROCEDURE — 87205 SMEAR GRAM STAIN: CPT | Performed by: PODIATRIST

## 2022-07-28 PROCEDURE — 99232 SBSQ HOSP IP/OBS MODERATE 35: CPT | Performed by: PHYSICIAN ASSISTANT

## 2022-07-28 PROCEDURE — 250N000009 HC RX 250: Performed by: PODIATRIST

## 2022-07-28 PROCEDURE — 999N000179 XR SURGERY CARM FLUORO LESS THAN 5 MIN W STILLS: Mod: TC

## 2022-07-28 PROCEDURE — 250N000011 HC RX IP 250 OP 636: Performed by: NURSE ANESTHETIST, CERTIFIED REGISTERED

## 2022-07-28 PROCEDURE — 36415 COLL VENOUS BLD VENIPUNCTURE: CPT | Performed by: PHYSICIAN ASSISTANT

## 2022-07-28 PROCEDURE — 258N000003 HC RX IP 258 OP 636: Performed by: ANESTHESIOLOGY

## 2022-07-28 PROCEDURE — 85027 COMPLETE CBC AUTOMATED: CPT | Performed by: INTERNAL MEDICINE

## 2022-07-28 PROCEDURE — 710N000012 HC RECOVERY PHASE 2, PER MINUTE: Performed by: PODIATRIST

## 2022-07-28 PROCEDURE — 85730 THROMBOPLASTIN TIME PARTIAL: CPT | Performed by: PHYSICIAN ASSISTANT

## 2022-07-28 PROCEDURE — 88311 DECALCIFY TISSUE: CPT | Mod: TC | Performed by: PODIATRIST

## 2022-07-28 PROCEDURE — 250N000013 HC RX MED GY IP 250 OP 250 PS 637: Performed by: PHYSICIAN ASSISTANT

## 2022-07-28 PROCEDURE — 0Y6M0Z9 DETACHMENT AT RIGHT FOOT, PARTIAL 1ST RAY, OPEN APPROACH: ICD-10-PCS | Performed by: PODIATRIST

## 2022-07-28 PROCEDURE — 370N000017 HC ANESTHESIA TECHNICAL FEE, PER MIN: Performed by: PODIATRIST

## 2022-07-28 PROCEDURE — 88311 DECALCIFY TISSUE: CPT | Mod: 26 | Performed by: PATHOLOGY

## 2022-07-28 PROCEDURE — 28122 PARTIAL REMOVAL OF FOOT BONE: CPT | Mod: RT | Performed by: PODIATRIST

## 2022-07-28 RX ORDER — PROPOFOL 10 MG/ML
INJECTION, EMULSION INTRAVENOUS CONTINUOUS PRN
Status: DISCONTINUED | OUTPATIENT
Start: 2022-07-28 | End: 2022-07-28

## 2022-07-28 RX ORDER — ONDANSETRON 2 MG/ML
4 INJECTION INTRAMUSCULAR; INTRAVENOUS EVERY 30 MIN PRN
Status: DISCONTINUED | OUTPATIENT
Start: 2022-07-28 | End: 2022-07-28 | Stop reason: HOSPADM

## 2022-07-28 RX ORDER — MAGNESIUM HYDROXIDE 1200 MG/15ML
LIQUID ORAL PRN
Status: DISCONTINUED | OUTPATIENT
Start: 2022-07-28 | End: 2022-07-28 | Stop reason: HOSPADM

## 2022-07-28 RX ORDER — ONDANSETRON 2 MG/ML
INJECTION INTRAMUSCULAR; INTRAVENOUS PRN
Status: DISCONTINUED | OUTPATIENT
Start: 2022-07-28 | End: 2022-07-28

## 2022-07-28 RX ORDER — SODIUM CHLORIDE, SODIUM LACTATE, POTASSIUM CHLORIDE, CALCIUM CHLORIDE 600; 310; 30; 20 MG/100ML; MG/100ML; MG/100ML; MG/100ML
INJECTION, SOLUTION INTRAVENOUS CONTINUOUS
Status: DISCONTINUED | OUTPATIENT
Start: 2022-07-28 | End: 2022-07-28 | Stop reason: HOSPADM

## 2022-07-28 RX ORDER — OXYCODONE HYDROCHLORIDE 5 MG/1
5 TABLET ORAL EVERY 4 HOURS PRN
Status: DISCONTINUED | OUTPATIENT
Start: 2022-07-28 | End: 2022-07-28 | Stop reason: HOSPADM

## 2022-07-28 RX ORDER — ONDANSETRON 4 MG/1
4 TABLET, ORALLY DISINTEGRATING ORAL EVERY 6 HOURS PRN
Status: DISCONTINUED | OUTPATIENT
Start: 2022-07-28 | End: 2022-08-01 | Stop reason: HOSPADM

## 2022-07-28 RX ORDER — HEPARIN SODIUM 10000 [USP'U]/100ML
0-5000 INJECTION, SOLUTION INTRAVENOUS CONTINUOUS
Status: DISCONTINUED | OUTPATIENT
Start: 2022-07-28 | End: 2022-07-30

## 2022-07-28 RX ORDER — VANCOMYCIN HYDROCHLORIDE 1 G/20ML
INJECTION, POWDER, LYOPHILIZED, FOR SOLUTION INTRAVENOUS PRN
Status: DISCONTINUED | OUTPATIENT
Start: 2022-07-28 | End: 2022-07-28 | Stop reason: HOSPADM

## 2022-07-28 RX ORDER — BISACODYL 10 MG
10 SUPPOSITORY, RECTAL RECTAL DAILY PRN
Status: DISCONTINUED | OUTPATIENT
Start: 2022-07-28 | End: 2022-08-01 | Stop reason: HOSPADM

## 2022-07-28 RX ORDER — FENTANYL CITRATE 50 UG/ML
INJECTION, SOLUTION INTRAMUSCULAR; INTRAVENOUS PRN
Status: DISCONTINUED | OUTPATIENT
Start: 2022-07-28 | End: 2022-07-28

## 2022-07-28 RX ORDER — POLYETHYLENE GLYCOL 3350 17 G/17G
17 POWDER, FOR SOLUTION ORAL DAILY
Status: DISCONTINUED | OUTPATIENT
Start: 2022-07-29 | End: 2022-08-01 | Stop reason: HOSPADM

## 2022-07-28 RX ORDER — PROCHLORPERAZINE MALEATE 10 MG
10 TABLET ORAL EVERY 6 HOURS PRN
Status: DISCONTINUED | OUTPATIENT
Start: 2022-07-28 | End: 2022-07-28

## 2022-07-28 RX ORDER — LIDOCAINE 40 MG/G
CREAM TOPICAL
Status: DISCONTINUED | OUTPATIENT
Start: 2022-07-28 | End: 2022-07-28 | Stop reason: HOSPADM

## 2022-07-28 RX ORDER — ACETAMINOPHEN 325 MG/1
975 TABLET ORAL EVERY 8 HOURS
Status: DISPENSED | OUTPATIENT
Start: 2022-07-28 | End: 2022-07-31

## 2022-07-28 RX ORDER — AMOXICILLIN 250 MG
1 CAPSULE ORAL 2 TIMES DAILY
Status: DISCONTINUED | OUTPATIENT
Start: 2022-07-28 | End: 2022-08-01 | Stop reason: HOSPADM

## 2022-07-28 RX ORDER — HEPARIN SODIUM 10000 [USP'U]/100ML
0-5000 INJECTION, SOLUTION INTRAVENOUS CONTINUOUS
Status: DISCONTINUED | OUTPATIENT
Start: 2022-07-28 | End: 2022-07-28

## 2022-07-28 RX ORDER — HYDROMORPHONE HCL IN WATER/PF 6 MG/30 ML
0.2 PATIENT CONTROLLED ANALGESIA SYRINGE INTRAVENOUS EVERY 5 MIN PRN
Status: DISCONTINUED | OUTPATIENT
Start: 2022-07-28 | End: 2022-07-28 | Stop reason: HOSPADM

## 2022-07-28 RX ORDER — ACETAMINOPHEN 325 MG/1
650 TABLET ORAL EVERY 4 HOURS PRN
Status: DISCONTINUED | OUTPATIENT
Start: 2022-07-31 | End: 2022-07-28

## 2022-07-28 RX ORDER — ONDANSETRON 2 MG/ML
4 INJECTION INTRAMUSCULAR; INTRAVENOUS EVERY 6 HOURS PRN
Status: DISCONTINUED | OUTPATIENT
Start: 2022-07-28 | End: 2022-08-01 | Stop reason: HOSPADM

## 2022-07-28 RX ORDER — LIDOCAINE HYDROCHLORIDE 10 MG/ML
INJECTION, SOLUTION EPIDURAL; INFILTRATION; INTRACAUDAL; PERINEURAL PRN
Status: DISCONTINUED | OUTPATIENT
Start: 2022-07-28 | End: 2022-07-28

## 2022-07-28 RX ORDER — BUPIVACAINE HYDROCHLORIDE 5 MG/ML
INJECTION, SOLUTION EPIDURAL; INTRACAUDAL PRN
Status: DISCONTINUED | OUTPATIENT
Start: 2022-07-28 | End: 2022-07-28 | Stop reason: HOSPADM

## 2022-07-28 RX ORDER — FENTANYL CITRATE 50 UG/ML
25 INJECTION, SOLUTION INTRAMUSCULAR; INTRAVENOUS EVERY 5 MIN PRN
Status: DISCONTINUED | OUTPATIENT
Start: 2022-07-28 | End: 2022-07-28 | Stop reason: HOSPADM

## 2022-07-28 RX ORDER — ONDANSETRON 4 MG/1
4 TABLET, ORALLY DISINTEGRATING ORAL EVERY 30 MIN PRN
Status: DISCONTINUED | OUTPATIENT
Start: 2022-07-28 | End: 2022-07-28 | Stop reason: HOSPADM

## 2022-07-28 RX ORDER — LIDOCAINE 40 MG/G
CREAM TOPICAL
Status: DISCONTINUED | OUTPATIENT
Start: 2022-07-28 | End: 2022-07-29

## 2022-07-28 RX ADMIN — AMLODIPINE BESYLATE 5 MG: 5 TABLET ORAL at 21:05

## 2022-07-28 RX ADMIN — PROPOFOL 150 MCG/KG/MIN: 10 INJECTION, EMULSION INTRAVENOUS at 13:21

## 2022-07-28 RX ADMIN — GABAPENTIN 400 MG: 400 CAPSULE ORAL at 21:06

## 2022-07-28 RX ADMIN — ONDANSETRON HYDROCHLORIDE 4 MG: 2 INJECTION, SOLUTION INTRAVENOUS at 13:35

## 2022-07-28 RX ADMIN — CEFAZOLIN SODIUM 2 G: 2 INJECTION, SOLUTION INTRAVENOUS at 13:57

## 2022-07-28 RX ADMIN — LIDOCAINE HYDROCHLORIDE 15 MG: 10 INJECTION, SOLUTION EPIDURAL; INFILTRATION; INTRACAUDAL; PERINEURAL at 13:20

## 2022-07-28 RX ADMIN — GABAPENTIN 400 MG: 400 CAPSULE ORAL at 07:46

## 2022-07-28 RX ADMIN — HEPARIN SODIUM AND DEXTROSE 1500 UNITS/HR: 10000; 5 INJECTION INTRAVENOUS at 01:42

## 2022-07-28 RX ADMIN — MIDAZOLAM 2 MG: 1 INJECTION INTRAMUSCULAR; INTRAVENOUS at 13:14

## 2022-07-28 RX ADMIN — FENTANYL CITRATE 50 MCG: 50 INJECTION, SOLUTION INTRAMUSCULAR; INTRAVENOUS at 13:22

## 2022-07-28 RX ADMIN — SODIUM CHLORIDE, POTASSIUM CHLORIDE, SODIUM LACTATE AND CALCIUM CHLORIDE: 600; 310; 30; 20 INJECTION, SOLUTION INTRAVENOUS at 12:22

## 2022-07-28 RX ADMIN — PANTOPRAZOLE SODIUM 40 MG: 40 TABLET, DELAYED RELEASE ORAL at 07:46

## 2022-07-28 RX ADMIN — ATORVASTATIN CALCIUM 40 MG: 40 TABLET, FILM COATED ORAL at 21:05

## 2022-07-28 RX ADMIN — CEFAZOLIN SODIUM 2 G: 2 INJECTION, SOLUTION INTRAVENOUS at 23:18

## 2022-07-28 RX ADMIN — AMLODIPINE BESYLATE 5 MG: 5 TABLET ORAL at 07:46

## 2022-07-28 RX ADMIN — CEFAZOLIN SODIUM 2 G: 2 INJECTION, SOLUTION INTRAVENOUS at 05:39

## 2022-07-28 ASSESSMENT — ACTIVITIES OF DAILY LIVING (ADL)
ADLS_ACUITY_SCORE: 21
DEPENDENT_IADLS:: INDEPENDENT
ADLS_ACUITY_SCORE: 21

## 2022-07-28 NOTE — PLAN OF CARE
Shift from [id number]     Inpatient Progress Note:  For complete assessment see flow sheet documentation.      Orientation: A/O x4  Neuro: WDL, sleeping through cares  Pain status: Denies  Activity: SBA in the room  Peripheral edema: None  Resp: WDL  Cardiac: WDL  GI: WDL, obese  : WDL  Skin: Right foot dressing in place, C/D/I.  LDA: PIV x2  Infusions: left PIV infusing heparin, stopped at 0428 per orders. Ancef scheduled for 0600.  Pertinent Labs: PTT at 57 on 7/27/22 1534, 68 on 7/27/22 at 2350--first lab in therapeutic range. Redraw at 0550.  Diet: NPO since 0000 for right foot metatarsal resection.  Consults: ID and Podiatry following  Discharge Plan: TBD following procedure    **Pt has right foot metatarsal resection this morning, 7/28/22 at 1230.

## 2022-07-28 NOTE — PROGRESS NOTES
[facility]  Infectious Disease Progress Note          Assessment and Plan:   IMPRESSION:    1.  A 62-year-old male, well known to me, readmitted with again worsening right great toe infection previously 2 amputations, felt fully resected, but now apparent recurrent infection and at surgery, evidence of soft bone.  Biopsy and cultures are pending.  Previous cultures had mixed josé, prominently Staphylococcus aureus.  3.  Current admission minimal systemic sepsis, but some prominent fatigue over the last several weeks.  One of blood cultures at admission growing sensitive Staphylococcus aureus, presumably coming from the foot.  No evidence of any secondary involved sites, no artificial material, etc.  4.  Prior history of recurrent diabetic foot infection and osteomyelitis that was felt fully resected when last seen in early June.  5.  Prior deep venous thrombosis recently.  6.  Diabetes mellitus.  7.  SULFA ALLERGY, BUT WAS PRIMARILY GI INTOLERANCE.     RECOMMENDATIONS:    1.  With MSSA only to  to ancef  2.  Serial blood cultures to make sure bacteremia clears. Follow-up neg  3.  We will need extended IV antibiotics, regardless of the foot, status simply because of the bacteremia. BC clear Ok midline  4.  Follow closely for any secondary sites. None apparent  5. +biopsy and culture MSSA only, in particular further evidence of osteo, which clinically sounds like the case from the operative findings. Plan is  further resection. In this case is going to get IV antibiotics anyway.  Probably reasonable from a foot standpoint, almost regardless of how much amputation we do.             Interval History:   no new complaints and doing well; no cp, sob, n/v/d, or abd pain. T down Follow-up cx neg so far, foot staph only so far path + op now              Medications:       amLODIPine  5 mg Oral BID     [Auto Hold] atorvastatin  40 mg Oral Daily     [Auto Hold] ceFAZolin  2 g Intravenous Q8H     [Auto  "Hold] gabapentin  400 mg Oral TID     [Auto Hold] insulin aspart  1-7 Units Subcutaneous TID AC     [Auto Hold] insulin aspart  1-5 Units Subcutaneous At Bedtime     lisinopril  40 mg Oral Daily     [Auto Hold] pantoprazole  40 mg Oral QAM AC     [Auto Hold] primidone  50 mg Oral Q8H MELISSA     sodium chloride (PF)  3 mL Intracatheter Q8H     [Auto Hold] sodium chloride (PF)  3 mL Intracatheter Q8H                  Physical Exam:   Blood pressure 138/71, pulse 53, temperature 98.2  F (36.8  C), temperature source Temporal, resp. rate 16, height 1.803 m (5' 11\"), weight 105.7 kg (233 lb), SpO2 95 %.  Wt Readings from Last 2 Encounters:   07/22/22 105.7 kg (233 lb)   07/20/22 105.9 kg (233 lb 8 oz)     Vital Signs with Ranges  Temp:  [97.5  F (36.4  C)-98.5  F (36.9  C)] 98.2  F (36.8  C)  Pulse:  [47-66] 53  Resp:  [16-18] 16  BP: (134-147)/(58-74) 138/71  SpO2:  [93 %-97 %] 95 %    Constitutional: Awake, alert, cooperative, no apparent distress   Lungs: Clear to auscultation bilaterally, no crackles or wheezing   Cardiovascular: Regular rate and rhythm, normal S1 and S2, and no murmur noted   Abdomen: Normal bowel sounds, soft, non-distended, non-tender   Skin: No rashes, no cyanosis, no edema foot wrapped   Other:           Data:   All microbiology laboratory data reviewed.  Recent Labs   Lab Test 07/27/22  0909 07/25/22  0905 07/24/22  0944   WBC 4.4 4.7 4.7   HGB 10.6* 9.9* 11.4*   HCT 34.6* 32.5* 36.8*   MCV 90 91 90    237 293     Recent Labs   Lab Test 07/27/22  0909 07/26/22  0600 07/25/22  0548   CR 1.04 1.14 1.10  1.06     Recent Labs   Lab Test 07/26/22  1259   SED 93*     Recent Labs   Lab Test 04/20/21  0925 04/20/21  0825 11/07/19  1829 11/07/19  1816 10/31/19  0229 10/31/19  0213 08/02/19  1714 06/24/19 2002 06/24/19 1956   CULT No growth No growth No growth No growth No growth No growth No anaerobes isolated  Light growth  Streptococcus dysgalactiae serogroup C/G  *  Light " growth  Staphylococcus aureus  * No growth No growth

## 2022-07-28 NOTE — PROGRESS NOTES
Pt has coverage for iv abx through their BC[employer] plan. Pt has met their deductible and OOP so pt should be covered at 100%.       Please contact Intake with any questions, [phone] or In Basket pool, [facility] ([phone]).

## 2022-07-28 NOTE — CONSULTS
Care Management Initial Consult    General Information  Assessment completed with: Patient,    Type of CM/SW Visit: Initial Assessment    Primary Care Provider verified and updated as needed: Yes   Readmission within the last 30 days: no previous admission in last 30 days      Reason for Consult: care coordination/care conference, discharge planning    Communication Assessment  Patient's communication style: spoken language (English or Bilingual)    Hearing Difficulty or Deaf: no   Wear Glasses or Blind: yes    Cognitive  Cognitive/Neuro/Behavioral: WDL  Level of Consciousness: alert  Arousal Level: opens eyes spontaneously                Living Environment:   People in home: spouse, child(zan), adult     Current living Arrangements: house      Able to return to prior arrangements: yes     Family/Social Support:  Care provided by: self  Provides care for: no one  Marital Status:   Wife, Children          Description of Support System: Supportive, Involved    Support Assessment: Adequate family and caregiver support    Current Resources:   Patient receiving home care services: No  Community Resources: None  Equipment currently used at home: none  Supplies currently used at home: Wound Care Supplies    Lifestyle & Psychosocial Needs:  Social Determinants of Health     Tobacco Use: Low Risk      Smoking Tobacco Use: Never Smoker     Smokeless Tobacco Use: Never Used   Alcohol Use: Not At Risk     Frequency of Alcohol Consumption: 2-4 times a month     Average Number of Drinks: 1 or 2     Frequency of Binge Drinking: Never   Financial Resource Strain: Low Risk      Difficulty of Paying Living Expenses: Not very hard   Food Insecurity: No Food Insecurity     Worried About Running Out of Food in the Last Year: Never true     Ran Out of Food in the Last Year: Never true   Transportation Needs: No Transportation Needs     Lack of Transportation (Medical): No     Lack of Transportation (Non-Medical): No   Physical  Activity: Sufficiently Active     Days of Exercise per Week: 4 days     Minutes of Exercise per Session: 40 min   Stress: No Stress Concern Present     Feeling of Stress : Not at all   Social Connections: Moderately Isolated     Frequency of Communication with Friends and Family: Once a week     Frequency of Social Gatherings with Friends and Family: Never     Attends Spiritism Services: More than 4 times per year     Active Member of Clubs or Organizations: No     Attends Club or Organization Meetings: Not on file     Marital Status:    Intimate Partner Violence: Not on file   Depression: Not at risk     PHQ-2 Score: 0   Housing Stability: Low Risk      Unable to Pay for Housing in the Last Year: No     Number of Places Lived in the Last Year: 2     Unstable Housing in the Last Year: No       Functional Status:  Prior to admission patient needed assistance:   Dependent ADLs:: Independent  Dependent IADLs:: Independent     Additional Information:  CM consulted for discharge planning. Per ID plan, planning for extended IV antibiotics at discharge. Met with pt at bedside to assess for needs. Pt lives in a house with his wife and adult son. Pt is independent at baseline and does not use assistive devices prior to admission. Pt manages his own medications, cooking, cleaning, laundry, driving and dressing changes. Pt is hopeful to discharge to home with home infusion and denies having any other needs at this time.     Briefly explained home infusion services with pt, pt would like to move forward with benefit check with [facility]. Discussed PT evaluation after I&D is completed, pt is agreeable to home PT if this is recommended and would prefer [facility]. Referral e-mail sent to [facility] requesting benefit check, anticipate [facility] liaison will follow-up with pt on 7/29 to review coverage and discuss HI further with pt.     If PT recommends TCU, CM will follow-up with pt to discuss further. Will  otherwise follow remotely at this time, please call if other needs arise.     Amber See RN BSN   Inpatient Care Coordination  [facility]   Phone [phone]

## 2022-07-28 NOTE — PROGRESS NOTES
[facility]    Received request for benefit check should Pat require home IV abx. Pt has coverage for IV abx through their BCBS plan. Pt has met their deductible and OOP so pt should be covered at 100%.    Thank you    Lanette Michaud RN  [facility] Liaison  [phone] (Mon thru Fri 8am - 5pm)  [phone] Office

## 2022-07-28 NOTE — PLAN OF CARE
"Patient is alert and oriented x4. VS WNL and documented on the FS. Lung sounds clear and patient is on RA. Denies SOB. Active bowel sounds with LBM on the 25th. Patient denies any pain, urgency, and frequency when voiding. (R) foot wrapped in kerlix. Patient has not requested anything for pain. Patient continues on Ancef. WB to heel of (R) foot. Patient is NPO.     Plan: (R) foot partial first metatarsal resection at 1230.     /58 (BP Location: Left arm)   Pulse (!) 47   Temp 97.5  F (36.4  C) (Oral)   Resp 16   Ht 1.803 m (5' 11\")   Wt 105.7 kg (233 lb)   SpO2 96%   BMI 32.50 kg/m        "

## 2022-07-28 NOTE — PROGRESS NOTES
[facility]  Internal Medicine  Progress Note    Date of Service: 7/28/2022    Patient: Crispin Rojas  MRN: [id number]  Admission Date: 7/22/2022      Assessment & Plan: Crispin Rojas is a 62 year old male with past medical history significant for type 2 diabetes mellitus, hypertension, hyperlipidemia, CVA, GILA, obesity, PVD, and recent right great toe amputation 06/2022 with persistent nonhealing surgical wound who presented to [facility] on 7/22/2022 with increased right foot pain and radiographic evidence of continued osteomyelitis with associated pathologic fracture and acute abscess.     Right Foot Ulcer with Osteomyelitis  POD #4 I/D right foot with bone biopsy of the right first metatarsal   Staph Aureus Bacteremia - pt with a recent right great toe amputation 06/2022 with persistent nonhealing surgical wound who presented to [facility] on 7/22/2022 with increased right foot pain and radiographic evidence of continued osteomyelitis with associated pathologic fracture and acute abscess.  Blood cultures positive on 7/22 for Staph Aureus and Staph Hominis.  Bone bx cx 7/24 positive for Staph Aureus  - Podiatry and ID following  - planning to return to the OR today for a partial resection of the first metatarsal  - follow serial blood cultures  - discontinued Zosyn and Vancomycin on 7/27/2022.    - started on Ancef on 7/27/22   - will need midline placed (timing per ID) and extended IV abx  - PT consulted - will see on 7/29.  Patient would like to discharge home  - SW consulted for discharge planning.  If plan to discharge home, will need outpatient abx set up.     Type 2 Diabetes Mellitus  Peripheral Neuropathy - diabetes diagnosed in 1990.  Hgb A1C 6.6 (6/2022).  BS have been .  Pt with a poor appetite.     - holding pta Metformin, Ozempic and home sliding scale insulin  - pt has not taken his pta Tresiba for a week pta due to poor appetite and this held on  admission due to npo status for surgery.  Even when tolerating po this was not resumed due to BS of 90-100s  Continue to hold.  - continue pta Gabapentin  - continue ISS protocol     Hx RLE DVT - (6/3/2022) RLE Doppler U/S revealed a subocclusive DVT within the right posterior tibial vein.  Likely provoked in the setting of right toe infection at that time.  Discharged on Eliquis with plan for 3 months of therapy. Eliquis held on this admission due to surgery.  RLE Doppler U/S on 7/26/22 revealed a new DVT in one of the paired right gastrocnemius veins and resolution of DVT in the posterior tibial vein.   - started on a heparin gtt on 7/26 due to surgery today  - resume heparin gtt after surgery when ok with Podiatry  - plan to discharge on pta Eliquis     Chronic Anemia - hgb 10.6, baseline 11.  Monitor     HTN - continue pta Amlodipine and Lisinopril.  Holding prn Lasix and hydrochlorothiazide with poor oral intake.     GILA - patient does not tolerate cpap.  Symptoms improved after weight loss.  Monitor     GERD - continue pta Omeprazole     Peripheral Vascular Disease - holding pta ASA.  Continue Atorvastatin     Hx CVA - 2010.  No residual deficits other than difficulty with remembering faces/names.  Holding ASA prior to surgery.  Continue Atorvastatin.     Essential Tremor - continue pta Primidone     Obesity, BMI 32     CODE: full  DVT: heparin  Diet/fluids: regular, npo after midnight  Disposition: likely discharge to home in 2-3 days.      Maria G Hernandez MS, PA-C  Hospitalist Physician Assistant  [facility]      Subjective & Interval Hx:    Pain is controlled.  Still with a poor appetite.  Denies chest pain, shortness of breath, cough, abdominal pain, nausea, emesis.  Ready for surgery this afternoon.    Last 24 hr care team notes reviewed.   ROS:  4 point ROS including Respiratory, CV, GI and , other than that noted in the HPI, is negative.    Physical Exam:    Blood pressure (!) 147/74,  "pulse 52, temperature 97.9  F (36.6  C), temperature source Oral, resp. rate 18, height 1.803 m (5' 11\"), weight 105.7 kg (233 lb), SpO2 96 %.  General: Alert, interactive, NAD  HEENT: AT/NC  Resp: clear to auscultation bilaterally, no crackles or wheezes  Cardiac: regular rate and rhythm, no murmur  Abdomen: Soft, nontender, nondistended. +BS.  No HSM or masses, no rebound or guarding.  Extremities: No LE edema.  Right foot wrapped, dressing c/d/i  Skin: Warm and dry  Neuro: Alert & oriented x 3, Cns 2-12 intact, moves all extremities equally    Labs & Images:  Reviewed in Epic   Medications:    Current Facility-Administered Medications   Medication     acetaminophen (TYLENOL) tablet 650 mg    Or     acetaminophen (TYLENOL) Suppository 650 mg     [Held by provider] amLODIPine (NORVASC) tablet 5 mg     atorvastatin (LIPITOR) tablet 40 mg     ceFAZolin (ANCEF) intermittent infusion 2 g in 100 mL dextrose PRE-MIX     glucose gel 15-30 g    Or     dextrose 50 % injection 25-50 mL    Or     glucagon injection 1 mg     gabapentin (NEURONTIN) capsule 400 mg     hydrALAZINE (APRESOLINE) injection 10 mg     insulin aspart (NovoLOG) injection (RAPID ACTING)     insulin aspart (NovoLOG) injection (RAPID ACTING)     lidocaine (LMX4) cream     lidocaine 1 % 0.1-1 mL     [Held by provider] lisinopril (ZESTRIL) tablet 40 mg     melatonin tablet 1 mg     naloxone (NARCAN) injection 0.2 mg    Or     naloxone (NARCAN) injection 0.4 mg    Or     naloxone (NARCAN) injection 0.2 mg    Or     naloxone (NARCAN) injection 0.4 mg     ondansetron (ZOFRAN ODT) ODT tab 4 mg    Or     ondansetron (ZOFRAN) injection 4 mg     oxyCODONE (ROXICODONE) tablet 5 mg     pantoprazole (PROTONIX) EC tablet 40 mg     primidone (MYSOLINE) tablet 50 mg     prochlorperazine (COMPAZINE) injection 10 mg    Or     prochlorperazine (COMPAZINE) tablet 10 mg    Or     prochlorperazine (COMPAZINE) suppository 25 mg     senna-docusate (SENOKOT-S/PERICOLACE) 8.6-50 " MG per tablet 1 tablet    Or     senna-docusate (SENOKOT-S/PERICOLACE) 8.6-50 MG per tablet 2 tablet     sodium chloride (PF) 0.9% PF flush 3 mL     sodium chloride (PF) 0.9% PF flush 3 mL

## 2022-07-28 NOTE — BRIEF OP NOTE
[facility] [place]    Brief Operative Note    Pre-operative diagnosis: Right first metatarsal osteomyelitis   Post-operative diagnosis Same as pre-operative diagnosis    Procedure: Right partial first metatarsal resection   Surgeon: Tiny Soto DPM  Anesthesia: MAC    Estimated Blood Loss: 30 ml   7/28/2022 12:00 AM to 7/28/2022  2:42 PM *      Drains: None  Specimens: Right proximal first metatarsal for bone culture, proximal first metatarsal pathology    1st metatarsal gross pathology   Findings:   No significant purulence. Significant nonviable and fragmented, bone to distal metatarsal   Complications: None.  Implants: * No implants in log *

## 2022-07-28 NOTE — OP NOTE
PREOPERATIVE DIAGNOSES:     1.  Right foot first metatarsal osteomyelitis     POSTOPERATIVE DIAGNOSES:   1.  Right foot first metatarsal osteomyelitis         PROCEDURE:     1.  Right foot partial first metatarsal resection           ANESTHESIA:  MAC with local.       HEMOSTASIS:  Electrocautery.       ESTIMATED BLOOD LOSS:  30 mL.       SPECIMENS:  First metatarsal gross pathology, Proximal bone culture (first metatarsal), proximal bone culture for pathology       MATERIALS:  Vancomycin powder     Indications: Crispin Rojas recently had a right hallux amputation for osteomyelitis in early June of this year. His incision didn't heal completely and he had ongoing drainage and pain. A second MRI was done that showed osteomyelitis to the first metatarsal. Bone culture and biopsies also confirmed this. Recommendation was made for removal of all infection, in this case, the entire metatarsal, but patient does live an active life, depending on the biomechanical function of his medial arch. Given this, a partial resection will be done with repeat margins taken. He was admitted with bacteremia and plan is for prolonged IV abx at discharge. These will be used to help treat any residual foot infection also. Procedure was discussed with patient at length, including all risks and benefits. Patient agrees to planned procedure.     Procedure: Under mild sedation pt was brought into the OR & placed on the operating table in a supine position. 10 cc of .5% marcaine were injected into the first met in a stevenson block formation  The foot was scrubbed, prepped and draped in an aseptic manner. The previously made incision was opened and extended, exposing the first metatarsal. The distal first metatarsal was found to be significantly fragmented, soft and nonviable. This was dissected to healthy bone, proximally. Then the sagittal saw was used to resect the metatarsal, to what appeared to be healthy bone. Upon completion of this, the  distal half of the metatarsal was removed. Then significant time was spent removing all alejandro fragments and the sesamoid complex, which were fragmented and embedded in the soft tissue. A mini c arm was used to confirm removal of all osseous fragments. Continued dissection was done of all nonviable soft tissue, capsule and tendon, using a rongeur. Site was then irrigated with copious amounts of normal sterile saline. Then the sagittal saw was used to transect a small piece of bone to send for pathology and culture. The bone was found to be relatively viable.      All resected bone was sent to pathology.  All nonviable soft tissue was resected  insuring no necrotic tissue proximal to the amputation remained. Next copious amounts of saline were used to flush the amputation site. Site was then closed with 3-0 vicryl and 3.0 prolene suture.  Upon completion of suturing, a non-adhesive sterile dressing was then placed over the surgical site followed by 4 x 4s, kerlix, & an ACE wrap.     The pt tolerated the procedure & anesthesia well.  He was transferred to the recovery room with vital signs stable and vascular status intact to the right foot.  Following a period of post-op monitoring the pt will return to his hospital bed with the following written and oral post-op instructions:    Keep dressing clean, dry and intact.  Do not remove dressing.  WB to heel only in CAM boot  Elevate foot at rest.  Continue IV antibiotics  Contact Dr. Soto if any post-op questions or arrise.     Intraop findings: Distal metatarsal was extremely fragmented and nonviable. Proximal aspect appeared healthy. Adequate blood flow for procedure    Post op plan: Will change dressing on POD#1. WB to heel of right foot. No further surgery planned currently. Will follow proximal bone cultures. Abx per ID for this and bacteremia. Will restart heparin 8 hours post op (2230)

## 2022-07-28 NOTE — PROGRESS NOTES
Pt is alert and oriented x4, VSS, on RA, SBA, WB as tolerated on the right foot, on Heparin drip, was given Heparin bolus during this shift, refer Epic. Pt is on regular diet, will be NPO as from midnight. Pt has a shower, the dressing on the right foot was change, some small dry serosanguinous drainage was noted.

## 2022-07-28 NOTE — ANESTHESIA CARE TRANSFER NOTE
Patient: Crispin Rojas    Procedure: Procedure(s):  Right foot partial first metatarsal resection       Diagnosis: Acute osteomyelitis of right foot (H) [M86.171]  Diagnosis Additional Information: No value filed.    Anesthesia Type:   MAC     Note:    Oropharynx: oropharynx clear of all foreign objects  Level of Consciousness: awake  Oxygen Supplementation: room air    Independent Airway: airway patency satisfactory and stable  Dentition: dentition unchanged  Vital Signs Stable: post-procedure vital signs reviewed and stable  Report to RN Given: handoff report given  Patient transferred to: PACU  Comments: Patient awake/alert. To PACU on RA ventilating well. VSS. Report given.  Handoff Report: Identifed the Patient, Identified the Reponsible Provider, Reviewed the pertinent medical history, Discussed the surgical course, Reviewed Intra-OP anesthesia mangement and issues during anesthesia, Set expectations for post-procedure period and Allowed opportunity for questions and acknowledgement of understanding      Vitals:  Vitals Value Taken Time   /64 07/28/22 1440   Temp     Pulse 51 07/28/22 1443   Resp 14 07/28/22 1443   SpO2 90 % 07/28/22 1443   Vitals shown include unvalidated device data.    Electronically Signed By: SARAH Rowe CRNA  July 28, 2022  2:45 PM

## 2022-07-28 NOTE — PLAN OF CARE
"PRIMARY DIAGNOSIS: Right foot partial first metatarsal resection  OUTPATIENT/OBSERVATION GOALS TO BE MET BEFORE DISCHARGE:  1. Stable vital signs Yes  2. Tolerating diet:Yes  3. Pain controlled with oral pain medications:  Yes  4. Positive bowel sounds:  Hypoactive/distant  5. Voiding without difficulty:  D/T void   6. Able to ambulate:  Has not been OOB  7. Provider specific discharge goals met:  No    Discharge Planner Nurse   Safe discharge environment identified: Yes  Barriers to discharge: Yes       Entered by: Nela Agustin RN 07/28/2022 5:48 PM     Please review provider order for any additional goals.   Nurse to notify provider when observation goals have been met and patient is ready for discharge.    Patient is alert and oriented x4. BP slightly elevated. Lung sounds clear and patient is on RA. Denies SOB. Capno in place. Hypoactive bowel sounds.  Patient d/t void. Denies pain. Patient has neuropathy in BLE's.  (R) foot wrapped in kerlix. Patient has not requested anything for pain.  OR had placed sutures, a non-adhesive sterile dressing was then placed over the surgical site followed by 4 x 4s, kerlix, & an ACE wrap. Unable to assess pulses at this time. Patient able to move toes and cap refill <3 seconds. Patient continues on Ancef Q8hrs. Heparin gtt will be restarted at 2330 for a RLE DVT. WB to heel of right foot. Patient diabetic and BS was 103 prior to patient getting on the floor. At 1723 BS was 74, but patient ordered dinner and at 100%.     BP (!) 153/73 (BP Location: Left arm)   Pulse (!) 48   Temp 97.4  F (36.3  C) (Oral)   Resp 13   Ht 1.803 m (5' 11\")   Wt 105.7 kg (233 lb)   SpO2 96%   BMI 32.50 kg/m       "

## 2022-07-28 NOTE — ANESTHESIA POSTPROCEDURE EVALUATION
Patient: Crispin Rojas    Procedure: Procedure(s):  Right foot partial first metatarsal resection       Anesthesia Type:  MAC    Note:  Disposition: Outpatient   Postop Pain Control: Uneventful            Sign Out: Well controlled pain   PONV: No   Neuro/Psych: Uneventful            Sign Out: Acceptable/Baseline neuro status   Airway/Respiratory: Uneventful            Sign Out: Acceptable/Baseline resp. status   CV/Hemodynamics: Uneventful            Sign Out: Acceptable CV status; No obvious hypovolemia; No obvious fluid overload   Other NRE: NONE   DID A NON-ROUTINE EVENT OCCUR? No           Last vitals:  Vitals Value Taken Time   /73 07/28/22 1500   Temp 97.8  F (36.6  C) 07/28/22 1505   Pulse 45 07/28/22 1515   Resp 12 07/28/22 1515   SpO2 96 % 07/28/22 1515   Vitals shown include unvalidated device data.    Electronically Signed By: Jack Reinoso MD  July 28, 2022  3:16 PM

## 2022-07-28 NOTE — ANESTHESIA PREPROCEDURE EVALUATION
Anesthesia Pre-Procedure Evaluation    Patient: Crispin Rojas   MRN: [id number] : 1960        Procedure : Procedure(s):  Right foot partial first metatarsal resection          Past Medical History:   Diagnosis Date     Cerebral infarction (H)          Chronic infection      H/O blood clots      Hyperlipidemia LDL goal <70      Hypertension      Numbness and tingling      Obesity      GILA (obstructive sleep apnea) 10/22/2018    CPAP intolerant     PVD (peripheral vascular disease) (H)     s/p left partial toe amputation     Renal stone      Tremor      Type 2 diabetes mellitus with diabetic polyneuropathy, with long-term current use of insulin (H)       Past Surgical History:   Procedure Laterality Date     AMPUTATE FOOT Left 2016    Procedure: AMPUTATE FOOT;  Surgeon: Jack Younger DPM;  Location: [facility]     AMPUTATE TOE(S) Right 2016    Procedure: AMPUTATE TOE(S);  Surgeon: Rachelle Manriquez DPM, Pod;  Location: [facility]     AMPUTATE TOE(S) Right 2019    Procedure: Right fourth toe partial amputation for treatment of osteomyelitis.;  Surgeon: Jack Younger DPM;  Location: [facility]     AMPUTATE TOE(S) Left 2019    Procedure: Left second toe amputation at the metatarsophalangeal joint.;  Surgeon: Rachelle Manriquez DPM, Podiatry/Foot and Ankle Surgery;  Location: [facility]     AMPUTATE TOE(S) Right 2022    Procedure: AMPUTATION OF RIGHT GREAT TOE;  Surgeon: Wili Quiles DPM;  Location: [facility]     ANGIOGRAM       BIOPSY BONE FOOT Right 2022    Procedure: Bone biopsy, right first metatarsal.;  Surgeon: Valentino Molina DPM;  Location: [facility]     COLONOSCOPY       COMBINED CYSTOSCOPY, RETROGRADES, URETEROSCOPY, INSERT STENT Left 2016    Procedure: COMBINED CYSTOSCOPY, RETROGRADES, URETEROSCOPY, INSERT STENT;  Surgeon: Artemio Valenzuela MD;  Location: [facility]     COMBINED CYSTOSCOPY, RETROGRADES, URETEROSCOPY, LASER HOLMIUM LITHOTRIPSY URETER(S), INSERT STENT  Left 10/3/2016    Procedure: COMBINED CYSTOSCOPY, RETROGRADES, URETEROSCOPY, LASER HOLMIUM LITHOTRIPSY URETER(S), INSERT STENT;  Surgeon: Artemio Valenzuela MD;  Location: [facility]     EXTRACORPOREAL SHOCK WAVE LITHOTRIPSY (ESWL) Left 9/8/2016    Procedure: EXTRACORPOREAL SHOCK WAVE LITHOTRIPSY (ESWL);  Surgeon: Artemio Valenzuela MD;  Location: [facility]     INCISION AND DRAINAGE FOOT, COMBINED Right 7/24/2022    Procedure: Incision and drainage, right foot ;  Surgeon: Valentino Molina DPM;  Location: [facility]     RECESSION GASTROCNEMIUS Right 2/1/2016    Procedure: RECESSION GASTROCNEMIUS;  Surgeon: Rachelle Manriquez DPM, Pod;  Location: [facility]      Allergies   Allergen Reactions     Bactrim [Sulfamethoxazole W/Trimethoprim] Nausea and Vomiting     Sulfa Drugs Nausea     Niacin Swelling     SIMCOR caused edema in extremities     Simvastatin Swelling     SIMCOR caused edema in extremities      Social History     Tobacco Use     Smoking status: Never Smoker     Smokeless tobacco: Never Used   Substance Use Topics     Alcohol use: Yes     Alcohol/week: 0.0 standard drinks     Comment: rare---red wine 3x per month      Wt Readings from Last 1 Encounters:   07/22/22 105.7 kg (233 lb)        Anesthesia Evaluation   Pt has had prior anesthetic. Type: General.    History of anesthetic complications       ROS/MED HX  ENT/Pulmonary:     (+) sleep apnea,     Neurologic:     (+) CVA,     Cardiovascular:     (+) hypertension-----    METS/Exercise Tolerance:     Hematologic:     (+) anemia,     Musculoskeletal:   (+) arthritis,     GI/Hepatic:     (+) liver disease,     Renal/Genitourinary:     (+) renal disease, type: CRI,     Endo:     (+) type II DM, Diabetic complications: nephropathy neuropathy. Obesity,     Psychiatric/Substance Use:       Infectious Disease: Comment: foot      Malignancy:       Other:            Physical Exam    Airway        Mallampati: III   TM distance: > 3 FB   Neck ROM: full   Mouth opening: > 3  cm    Respiratory Devices and Support         Dental       (+) missing      Cardiovascular   cardiovascular exam normal          Pulmonary   pulmonary exam normal                OUTSIDE LABS:  CBC:   Lab Results   Component Value Date    WBC 4.4 07/27/2022    WBC 4.7 07/25/2022    HGB 10.6 (L) 07/27/2022    HGB 9.9 (L) 07/25/2022    HCT 34.6 (L) 07/27/2022    HCT 32.5 (L) 07/25/2022     07/27/2022     07/25/2022     BMP:   Lab Results   Component Value Date     07/27/2022     07/25/2022    POTASSIUM 3.9 07/27/2022    POTASSIUM 4.4 07/26/2022    CHLORIDE 106 07/27/2022    CHLORIDE 109 07/25/2022    CO2 24 07/27/2022    CO2 25 07/25/2022    BUN 13.1 07/27/2022    BUN 16 07/25/2022    CR 1.04 07/27/2022    CR 1.14 07/26/2022     (H) 07/28/2022     (H) 07/28/2022     COAGS:   Lab Results   Component Value Date    PTT 39 (H) 07/28/2022    INR 1.08 11/07/2019     POC:   Lab Results   Component Value Date     (H) 05/10/2021     HEPATIC:   Lab Results   Component Value Date    ALBUMIN 3.6 05/10/2021    PROTTOTAL 7.4 05/10/2021    ALT 56 05/10/2021    AST 38 05/10/2021    ALKPHOS 54 05/10/2021    BILITOTAL 0.5 05/10/2021     OTHER:   Lab Results   Component Value Date    LACT 1.7 07/22/2022    A1C 6.6 (H) 06/03/2022    NARA 8.8 07/27/2022    MAG 2.1 08/02/2019    LIPASE 156 07/22/2022    TSH 1.18 07/23/2022    CRP 15.65 (H) 07/27/2022    SED 93 (H) 07/26/2022       Anesthesia Plan    ASA Status:  4      Anesthesia Type: MAC.     - Reason for MAC: straight local not clinically adequate   Induction: Intravenous.           Consents    Anesthesia Plan(s) and associated risks, benefits, and realistic alternatives discussed. Questions answered and patient/representative(s) expressed understanding.    - Discussed:     - Discussed with:  Patient      - Extended Intubation/Ventilatory Support Discussed: No.      - Patient is DNR/DNI Status: No    Use of blood products discussed: No .      Postoperative Care    Pain management: IV analgesics, Oral pain medications, Multi-modal analgesia.   PONV prophylaxis: Ondansetron (or other 5HT-3), Dexamethasone or Solumedrol     Comments:                Jack Reinoso MD

## 2022-07-29 ENCOUNTER — APPOINTMENT (OUTPATIENT)
Dept: PHYSICAL THERAPY | Facility: CLINIC | Age: 62
End: 2022-07-29
Attending: PHYSICIAN ASSISTANT
Payer: COMMERCIAL

## 2022-07-29 LAB
BACTERIA BLD CULT: ABNORMAL
BACTERIA BLD CULT: ABNORMAL
BACTERIA BLD CULT: NO GROWTH
BACTERIA BLD CULT: NO GROWTH
ERYTHROCYTE [DISTWIDTH] IN BLOOD BY AUTOMATED COUNT: 13.3 % (ref 10–15)
ERYTHROCYTE [DISTWIDTH] IN BLOOD BY AUTOMATED COUNT: 13.4 % (ref 10–15)
GLUCOSE BLDC GLUCOMTR-MCNC: 102 MG/DL (ref 70–99)
GLUCOSE BLDC GLUCOMTR-MCNC: 110 MG/DL (ref 70–99)
GLUCOSE BLDC GLUCOMTR-MCNC: 115 MG/DL (ref 70–99)
GLUCOSE BLDC GLUCOMTR-MCNC: 118 MG/DL (ref 70–99)
GLUCOSE BLDC GLUCOMTR-MCNC: 123 MG/DL (ref 70–99)
GLUCOSE BLDC GLUCOMTR-MCNC: 128 MG/DL (ref 70–99)
GLUCOSE BLDC GLUCOMTR-MCNC: 98 MG/DL (ref 70–99)
HCT VFR BLD AUTO: 34.2 % (ref 40–53)
HCT VFR BLD AUTO: 34.3 % (ref 40–53)
HGB BLD-MCNC: 10.4 G/DL (ref 13.3–17.7)
HGB BLD-MCNC: 10.4 G/DL (ref 13.3–17.7)
MCH RBC QN AUTO: 27.3 PG (ref 26.5–33)
MCH RBC QN AUTO: 27.9 PG (ref 26.5–33)
MCHC RBC AUTO-ENTMCNC: 30.3 G/DL (ref 31.5–36.5)
MCHC RBC AUTO-ENTMCNC: 30.4 G/DL (ref 31.5–36.5)
MCV RBC AUTO: 90 FL (ref 78–100)
MCV RBC AUTO: 92 FL (ref 78–100)
PLATELET # BLD AUTO: 199 10E3/UL (ref 150–450)
PLATELET # BLD AUTO: 206 10E3/UL (ref 150–450)
RBC # BLD AUTO: 3.73 10E6/UL (ref 4.4–5.9)
RBC # BLD AUTO: 3.81 10E6/UL (ref 4.4–5.9)
UFH PPP CHRO-ACNC: 0.1 IU/ML
UFH PPP CHRO-ACNC: 0.15 IU/ML
UFH PPP CHRO-ACNC: 0.3 IU/ML
WBC # BLD AUTO: 5.4 10E3/UL (ref 4–11)
WBC # BLD AUTO: 6.1 10E3/UL (ref 4–11)

## 2022-07-29 PROCEDURE — 250N000013 HC RX MED GY IP 250 OP 250 PS 637: Performed by: PHYSICIAN ASSISTANT

## 2022-07-29 PROCEDURE — 99233 SBSQ HOSP IP/OBS HIGH 50: CPT | Performed by: SPECIALIST

## 2022-07-29 PROCEDURE — 99233 SBSQ HOSP IP/OBS HIGH 50: CPT | Performed by: PHYSICIAN ASSISTANT

## 2022-07-29 PROCEDURE — 85520 HEPARIN ASSAY: CPT | Performed by: PHYSICIAN ASSISTANT

## 2022-07-29 PROCEDURE — L4361 PNEUMA/VAC WALK BOOT PRE OTS: HCPCS

## 2022-07-29 PROCEDURE — 85520 HEPARIN ASSAY: CPT | Performed by: INTERNAL MEDICINE

## 2022-07-29 PROCEDURE — 36415 COLL VENOUS BLD VENIPUNCTURE: CPT | Performed by: PHYSICIAN ASSISTANT

## 2022-07-29 PROCEDURE — 250N000011 HC RX IP 250 OP 636: Performed by: INTERNAL MEDICINE

## 2022-07-29 PROCEDURE — 250N000013 HC RX MED GY IP 250 OP 250 PS 637: Performed by: PODIATRIST

## 2022-07-29 PROCEDURE — 120N000001 HC R&B MED SURG/OB

## 2022-07-29 PROCEDURE — 97161 PT EVAL LOW COMPLEX 20 MIN: CPT | Mod: GP

## 2022-07-29 PROCEDURE — 85014 HEMATOCRIT: CPT | Performed by: PHYSICIAN ASSISTANT

## 2022-07-29 PROCEDURE — 97530 THERAPEUTIC ACTIVITIES: CPT | Mod: GP

## 2022-07-29 RX ORDER — CEFAZOLIN SODIUM 2 G/100ML
2 INJECTION, SOLUTION INTRAVENOUS EVERY 8 HOURS
DISCHARGE
Start: 2022-07-29 | End: 2022-08-07

## 2022-07-29 RX ORDER — ASPIRIN 81 MG/1
81 TABLET ORAL DAILY
Status: DISCONTINUED | OUTPATIENT
Start: 2022-07-30 | End: 2022-08-01 | Stop reason: HOSPADM

## 2022-07-29 RX ORDER — METFORMIN HCL 500 MG
1000 TABLET, EXTENDED RELEASE 24 HR ORAL 2 TIMES DAILY WITH MEALS
Status: DISCONTINUED | OUTPATIENT
Start: 2022-07-29 | End: 2022-08-01 | Stop reason: HOSPADM

## 2022-07-29 RX ADMIN — CEFAZOLIN SODIUM 2 G: 2 INJECTION, SOLUTION INTRAVENOUS at 14:13

## 2022-07-29 RX ADMIN — PRIMIDONE 50 MG: 50 TABLET ORAL at 17:45

## 2022-07-29 RX ADMIN — POLYETHYLENE GLYCOL 3350 17 G: 17 POWDER, FOR SOLUTION ORAL at 08:51

## 2022-07-29 RX ADMIN — GABAPENTIN 400 MG: 400 CAPSULE ORAL at 08:52

## 2022-07-29 RX ADMIN — PANTOPRAZOLE SODIUM 40 MG: 40 TABLET, DELAYED RELEASE ORAL at 06:31

## 2022-07-29 RX ADMIN — HEPARIN SODIUM 1800 UNITS/HR: 10000 INJECTION, SOLUTION INTRAVENOUS at 17:34

## 2022-07-29 RX ADMIN — LISINOPRIL 40 MG: 40 TABLET ORAL at 08:52

## 2022-07-29 RX ADMIN — GABAPENTIN 400 MG: 400 CAPSULE ORAL at 19:51

## 2022-07-29 RX ADMIN — ATORVASTATIN CALCIUM 40 MG: 40 TABLET, FILM COATED ORAL at 08:52

## 2022-07-29 RX ADMIN — AMLODIPINE BESYLATE 5 MG: 5 TABLET ORAL at 08:52

## 2022-07-29 RX ADMIN — METFORMIN HYDROCHLORIDE 1000 MG: 500 TABLET, EXTENDED RELEASE ORAL at 17:46

## 2022-07-29 RX ADMIN — HEPARIN SODIUM 1500 UNITS/HR: 10000 INJECTION, SOLUTION INTRAVENOUS at 00:06

## 2022-07-29 RX ADMIN — CEFAZOLIN SODIUM 2 G: 2 INJECTION, SOLUTION INTRAVENOUS at 06:31

## 2022-07-29 RX ADMIN — SENNOSIDES AND DOCUSATE SODIUM 1 TABLET: 50; 8.6 TABLET ORAL at 19:51

## 2022-07-29 RX ADMIN — AMLODIPINE BESYLATE 5 MG: 5 TABLET ORAL at 19:51

## 2022-07-29 RX ADMIN — CEFAZOLIN SODIUM 2 G: 2 INJECTION, SOLUTION INTRAVENOUS at 21:56

## 2022-07-29 RX ADMIN — GABAPENTIN 400 MG: 400 CAPSULE ORAL at 14:13

## 2022-07-29 ASSESSMENT — ACTIVITIES OF DAILY LIVING (ADL)
ADLS_ACUITY_SCORE: 21

## 2022-07-29 NOTE — PLAN OF CARE
"[id number]    Inpatient Progress Note:    BP (!) 163/75 (BP Location: Left arm)   Pulse 53   Temp 98.3  F (36.8  C) (Oral)   Resp 18   Ht 1.803 m (5' 11\")   Wt 105.7 kg (233 lb)   SpO2 98%   BMI 32.50 kg/m         Orientation: A& O X 4  Neuro: Intact  Pain status: Denies pain  Activity: Independent   Resp: WDL   Diet: Regular  Discharge Plan:       A & O X 4. SBA X 1 . First metatarsal resection completed on 7/28/22.  Patient on Cefazolin ABX for Osteomyelitis.  Heparin Infusion running . Denies pain. Voided X 1 this shift.    Will continue to monitor and provide cares.     Parker Rosales RN          "

## 2022-07-29 NOTE — PROGRESS NOTES
07/29/22 1419   Quick Adds   Type of Visit Initial PT Evaluation   Living Environment   People in Home spouse   Current Living Arrangements house   Home Accessibility no concerns   Transportation Anticipated family or friend will provide   Living Environment Comments In home with wife, 4 steps to enter with railing. Everything is on one level. Does have 16 steps w railing to go downstairs but does not have to use them.   Self-Care   Usual Activity Tolerance good   Current Activity Tolerance moderate   Equipment Currently Used at Home none   Fall history within last six months no   Activity/Exercise/Self-Care Comment Does have SEC that has used temporarily from previous surgeries.   General Information   Onset of Illness/Injury or Date of Surgery 07/22/22   Referring Physician Maria G Hernandez PA-C   Patient/Family Therapy Goals Statement (PT) Return home and get back to work   Pertinent History of Current Problem (include personal factors and/or comorbidities that impact the POC) Per H&P: Crispin Rojas is a 62 year old male with past medical history significant for type 2 diabetes mellitus, hypertension, hyperlipidemia, CVA, GILA, obesity, PVD, and recent right great toe amputation 06/2022 with persistent nonhealing surgical wound who presented to [facility] on 7/22/2022 with increased right foot pain and radiographic evidence of continued osteomyelitis with associated pathologic fracture and acute abscess. R partial 1st metatarsal resection.   Existing Precautions/Restrictions weight bearing   Weight-Bearing Status - RLE other (see comments)  (Weight on R heel only. Use Cam boot for OOB activity)   Cognition   Affect/Mental Status (Cognition) WNL   Orientation Status (Cognition) oriented x 4   Pain Assessment   Patient Currently in Pain No   Integumentary/Edema   Integumentary/Edema Comments R foot/ankle swelling. Recent surgery.   Range of Motion (ROM)   Range of Motion ROM deficits secondary  to surgical procedure   Strength (Manual Muscle Testing)   Strength (Manual Muscle Testing) strength is WFL;Able to perform R SLR;Able to perform L SLR   Bed Mobility   Comment, (Bed Mobility) Supine>sit with SBA.   Transfers   Comment, (Transfers) Sit>stand with CGA   Gait/Stairs (Locomotion)   Distance in Feet (Required for LE Total Joints) 5'   Comment, (Gait/Stairs) Supervision with use of IV pole. CGA/Marcel for don/doff shoes, socks, and CAM boot.   Balance   Balance no deficits were identified   Clinical Impression   Criteria for Skilled Therapeutic Intervention Yes, treatment indicated   PT Diagnosis (PT) Decreased functional mobility   Influenced by the following impairments Recent surgery, R LE wraps, R LE swelling, Heel weight bearing on R LE.   Functional limitations due to impairments Transfers, gait, stairs   Clinical Presentation (PT Evaluation Complexity) Stable/Uncomplicated   Clinical Presentation Rationale Below level of baseline of functional mobility.   Clinical Decision Making (Complexity) low complexity   Planned Therapy Interventions (PT) gait training;stair training;patient/family education;transfer training   Anticipated Equipment Needs at Discharge (PT) cane, straight   Risk & Benefits of therapy have been explained evaluation/treatment results reviewed;care plan/treatment goals reviewed;risks/benefits reviewed;patient   PT Discharge Planning   PT Discharge Recommendation (DC Rec) home with assist   PT Rationale for DC Rec Pt is below baseline of function of mobility. Anticipated pt to return to home with assist with ambulation, stairs, transfers, and ADLs like dressing and bathing as pt is CGA/Marcel with donning/doffing with CAM boot, and CGA for donning/doffing shoes/socks. Weightbearing precautions are impacting pts normal gait and transfers requiring Supervision/SBA.   PT Brief overview of current status CGA/SBA x1   Plan of Care Review   Plan of Care Reviewed With patient   Total  Evaluation Time   Total Evaluation Time (Minutes) 10   Physical Therapy Goals   PT Frequency One time eval and treatment only   PT Predicted Duration/Target Date for Goal Attainment 08/01/22   PT Goals Transfers;Gait;PT Goal 1;Stairs   PT: Transfers Modified independent;Sit to/from stand;Assistive device   PT: Gait Modified independent;150 feet;Straight cane   PT: Stairs Modified independent;4 stairs;Rail on left   PT: Goal 1 Pt will verbally understand and successfully perform R heel weight bearing for OOB activity.

## 2022-07-29 NOTE — PROGRESS NOTES
[facility]    Medicine Progress Note - Hospitalist Service    Date of Admission:  7/22/2022    Assessment & Plan          Assessment & Plan: Crispin Rojas is a 62 year old male with past medical history significant for type 2 diabetes mellitus, hypertension, hyperlipidemia, CVA, GILA, obesity, PVD, and recent right great toe amputation 06/2022 with persistent nonhealing surgical wound who presented to [facility] on 7/22/2022 with increased right foot pain and radiographic evidence of continued osteomyelitis with associated pathologic fracture and acute abscess.     Right Foot Ulcer with Osteomyelitis  POD #5 I/D right foot with bone biopsy of the right first metatarsal and POD #1 R partial 1st metatarsal resection  Staph Aureus Bacteremia   - pt with a recent right great toe amputation 06/2022 with persistent nonhealing surgical wound who presented to [facility] on 7/22/2022 with increased right foot pain and radiographic evidence of continued osteomyelitis with associated pathologic fracture and acute abscess.  Blood cultures positive on 7/22 for Staph Aureus and Staph Hominis.  Bone bx cx 7/24 positive for Staph Aureus  - Podiatry and ID following  - POD #1 R partial 1st metatarsal resection  - follow serial blood cultures, negative since 7/24  - discontinued Zosyn and Vancomycin on 7/27/2022.    - started on Ancef on 7/27/22   - will need midline placed (timing per ID) and extended IV abx  - Podiatry wants to wait for bone cultures from 7/28 to result prior to discharge   - PT consulted, pending eval today. Pt wishes to return home  - SW consulted for discharge planning.  If plan to discharge home, will need outpatient abx set up.     Type 2 Diabetes Mellitus  Peripheral Neuropathy   - diabetes diagnosed in 1990.  Hgb A1C 6.6 (6/2022).  BS have been .  Pt with a poor appetite.     - pta Metformin, Ozempic and home sliding scale insulin have been on  hold.  - pt has not taken his pta Tresiba for a week pta due to poor appetite and this held on admission due to npo status for surgery.  Even when tolerating po this was not resumed due to BS of 90-100s  Continue to hold.  - pt reports glucose spikes with abx administration, would like correction for this. On review, glucose spikes between 120-140, which is reasonable but this is high according to the patient.    - ok for 2 units following abx admin if glucose is >100. Pt can participate in decision making regarding insulin administration  - resume carb correction with meals, 3 units per 10 carbs. Pt can participate in decision making regarding insulin administration  - appetite is improving, will resume home Metformin tonight. Continue holding home Ozempic and Tresiba  - continue pta Gabapentin  - continue ISS protocol     Hx RLE DVT - (6/3/2022) RLE Doppler U/S revealed a subocclusive DVT within the right posterior tibial vein.  Likely provoked in the setting of right toe infection at that time.  Discharged on Eliquis with plan for 3 months of therapy. Eliquis held on this admission due to surgery.  RLE Doppler U/S on 7/26/22 revealed a new DVT in one of the paired right gastrocnemius veins and resolution of DVT in the posterior tibial vein.   - started on a heparin gtt on 7/26 due to surgery 7/28  - heparin held for surgery 7/28, resumed afterwards at midnight  - resume pta Eliquis tomorrow morning, after 24 hours of heparin     Chronic Anemia - hgb 10.6, baseline 11.  Monitor     HTN - continue pta Amlodipine and Lisinopril.  Holding prn Lasix and hydrochlorothiazide with poor oral intake. BPs stable, oral intake slowly improving, reassess tomorrow     GILA - patient does not tolerate cpap.  Symptoms improved after weight loss.  Monitor     GERD - continue pta Omeprazole     Peripheral Vascular Disease - pta ASA held, resume in AM.  Continue Atorvastatin     Hx CVA - 2010.  No residual deficits other than  difficulty with remembering faces/names. PTA ASA held prior to surgery, resume in AM.  Continue Atorvastatin.     Essential Tremor - continue pta Primidone     Obesity, BMI 32       Diet: Regular Diet Adult    DVT Prophylaxis: Heparin gtt  Mccarthy Catheter: Not present  Central Lines: None  Cardiac Monitoring: None  Code Status: Full Code      Disposition Plan      Expected Discharge Date: 07/30/2022    Discharge Delays: IV Medication - consider oral or Home Infusion  PT Disposition recs needed  Destination: home with family          The patient's care was discussed with the Attending Physician, Dr. Nathan, Bedside Nurse, Patient and Podiatry Consultant.    Alethea Penny PA-C  Hospitalist Service  [facility]  Securely message with the Vocera Web Console (learn more here)  Text page via [employer] Paging/Directory         Clinically Significant Risk Factors Present on Admission                      ______________________________________________________________________    Interval History   Feeling well, minimal pain. Appetite improving but still not normal. Reports increase in glucose with abx admin    Data reviewed today: I reviewed all medications, new labs and imaging results over the last 24 hours. I personally reviewed no images or EKG's today.    Physical Exam   Vital Signs: Temp: 97.9  F (36.6  C) Temp src: Oral BP: 126/74 Pulse: 69   Resp: 19 SpO2: 95 % O2 Device: None (Room air) Oxygen Delivery: 2 LPM  Weight: 233 lbs 0 oz    GENERAL:  Comfortable.  PSYCH: pleasant, oriented, No acute distress.  HEENT:  Atraumatic, normocephalic. Normal conjunctiva, normal hearing, and oropharynx is normal.  NECK:  Supple, no neck vein distention   HEART:  Normal S1, S2 with no murmur, no pericardial rub, gallops or S3 or S4.  LUNGS:  Clear to auscultation, normal Respiratory effort. No wheezing, rales or ronchi.  GI:  Soft, normal bowel sounds. Non-tender, non distended.   EXTREMITIES:  Bilateral toe  amputations, bandage to R foot. No pedal edema, +2 pulses bilateral and equal.  SKIN:  Dry to touch, No rash, wound or ulcerations.  NEUROLOGIC:  CN 2-12 intact, BL 5/5 symmetric upper and lower extremity strength, sensation is intact with no focal deficits.      Data   Recent Labs   Lab 07/29/22  0818 07/29/22  0518 07/29/22  0255 07/28/22  2352 07/28/22  2125 07/27/22  1313 07/27/22  0909 07/26/22  0725 07/26/22  0600 07/25/22  0628 07/25/22  0548 07/24/22  0700 07/24/22  0600 07/23/22  0544 07/22/22  1705   WBC  --  5.4  --  6.1  --   --  4.4  --   --    < >  --    < >  --    < > 7.6   HGB  --  10.4*  --  10.4*  --   --  10.6*  --   --    < >  --    < >  --    < > 11.7*   MCV  --  90  --  92  --   --  90  --   --    < >  --    < >  --    < > 90   PLT  --  199  --  206  --   --  229  --   --    < >  --    < >  --    < > 298   NA  --   --   --   --   --   --  140  --   --   --  141  --  139   < > 133   POTASSIUM  --   --   --   --   --   --  3.9  --  4.4  --  4.1  --  3.9   < > 4.7   CHLORIDE  --   --   --   --   --   --  106  --   --   --  109  --  106   < > 100   CO2  --   --   --   --   --   --  24  --   --   --  25  --  26   < > 27   BUN  --   --   --   --   --   --  13.1  --   --   --  16  --  16   < > 20   CR  --   --   --   --   --   --  1.04  --  1.14  --  1.10  1.06  --  1.05  1.06   < > 1.02   ANIONGAP  --   --   --   --   --   --  10  --   --   --  7  --  7   < > 6   NARA  --   --   --   --   --   --  8.8  --   --   --  8.7  --  8.9   < > 9.6   *  --  102*  --  94   < > 99   < >  --    < > 107*   < > 90   < > 104*   LIPASE  --   --   --   --   --   --   --   --   --   --   --   --   --   --  156    < > = values in this interval not displayed.     Recent Results (from the past 24 hour(s))   XR Surgery LIZETH L/T 5 Min Fluoro w Stills    Narrative    This exam was marked as non-reportable because it will not be read by a   radiologist or a [place] non-radiologist provider.         X-ray rt Foot 3 vw  port: In PACU    Narrative    FOOT PORTABLE RIGHT THREE VIEWS  7/28/2022 3:37 PM    HISTORY: Status post partial first metatarsal resection. Foot pain.    COMPARISON: None.    FINDINGS: Status post transmetatarsal amputation of the great toe at  the level of the proximal portion of the first metatarsal shaft. No  acute fracture. Status post amputation of the middle and distal  phalanges of the fourth toe. No fracture. No osteomyelitis.    NEHA HIGHTOWER MD         SYSTEM ID:  [id number]     Medications     heparin 1,800 Units/hr (07/29/22 0732)       acetaminophen  975 mg Oral Q8H     amLODIPine  5 mg Oral BID     [START ON 7/30/2022] apixaban ANTICOAGULANT  5 mg Oral BID     [START ON 7/30/2022] aspirin  81 mg Oral Daily     atorvastatin  40 mg Oral Daily     ceFAZolin  2 g Intravenous Q8H     gabapentin  400 mg Oral TID     insulin aspart   Subcutaneous TID AC     insulin aspart  1-7 Units Subcutaneous TID AC     insulin aspart  1-5 Units Subcutaneous At Bedtime     lisinopril  40 mg Oral Daily     metFORMIN  1,000 mg Oral BID w/meals     pantoprazole  40 mg Oral QAM AC     polyethylene glycol  17 g Oral Daily     primidone  50 mg Oral Q8H MELISSA     senna-docusate  1 tablet Oral BID     sodium chloride (PF)  3 mL Intracatheter Q8H

## 2022-07-29 NOTE — PROGRESS NOTES
Patient was fit with short cam walker style boot.  Physical, verbal and written instruction given.  Patient has been instructed to not wear in bed and that he cannot use until seen by therapy to ensure safety and have his heel touch training.  Contact information given.  Celestino BALLARD.

## 2022-07-29 NOTE — PROVIDER NOTIFICATION
Sent page to hospitalist regarding below:  Needing hep gtt RN managed order, and needing lab orders to change from hep fx to PTT to match the medication order dosing. Needing to restart hep gtt post procedure. Thank you!

## 2022-07-29 NOTE — PLAN OF CARE
Physical Therapy Discharge Summary    Reason for therapy discharge:    No further expectations of functional progress.    Progress towards therapy goal(s). See goals on Care Plan in [facility] electronic health record for goal details.  Goals partially met.  Barriers to achieving goals:   Continues to be SBA/supervision with staff due to IV meds.    Therapy recommendation(s):    No further therapy is recommended.

## 2022-07-29 NOTE — CONSULTS
CLINICAL NUTRITION SERVICES  -  ASSESSMENT NOTE      Recommendations Ordered by Registered Dietitian (RD):   PRN Glucerna   Malnutrition:   % Weight Loss:  > 5% in 1 month (severe malnutrition)  % Intake:  </= 75% for >/= 1 month (severe malnutrition)  Subcutaneous Fat Loss:  None observed  Muscle Loss:  None observed  Fluid Retention:  None noted    Malnutrition Diagnosis: Severe malnutrition  In Context of:  Acute illness or injury     REASON FOR ASSESSMENT  Crispin Rojas is a 62 year old male seen by Registered Dietitian for [facility]    PMH:  - type 2 diabetes mellitus, hypertension, hyperlipidemia, CVA, GILA, obesity, PVD, and recent right great toe amputation 06/2022 with persistent nonhealing surgical wound  - osteomyelitis    NUTRITION HISTORY  - Information obtained from patient  - Patient is on a low carb diet at home (20-30g CHO per meal)  - Diet history: patient states that his appetite has greatly decreased over the past 1 month, has no appetite and only eats because he knows he needs to. Eats protein foods first, normally likes food but just does not have his normal appetite  - Supplements: MVI/M, vitamin D, fish oil, CoQ10  - reports regular n/v, states that he takes medications to help with this but still has no appetite    CURRENT NUTRITION ORDERS  Diet Order:     Orders Placed This Encounter      Regular Diet Adult    Current Intake/Tolerance:  Poor intakes this admission, missed meals d/t NPO status for surgery    NUTRITION FOCUSED PHYSICAL ASSESSMENT FOR DIAGNOSING MALNUTRITION)  Yes         Observed:    No nutrition-related physical findings observed    Obtained from Chart/Interdisciplinary Team:  pt with a recent right great toe amputation 06/2022 with persistent nonhealing surgical wound who presented to [facility] on 7/22/2022 with increased right foot pain and radiographic evidence of continued osteomyelitis with associated pathologic fracture and acute abscess.  Blood cultures  "positive on 7/22 for Staph Aureus and Staph Hominis.  Bone bx cx 7/24 positive for Staph Aureus    ANTHROPOMETRICS  Height: 5' 11\"  Weight: 233 lbs 0 oz  Body mass index is 32.5 kg/m .  Weight Status:  Obesity Grade I BMI 30-34.9  Weight History:  UBW: 235-240# per patient  5.3% weight loss in 3 weeks, potentially more but no new weight in 7 days  Wt Readings from Last 10 Encounters:   07/22/22 105.7 kg (233 lb)   07/20/22 105.9 kg (233 lb 8 oz)   07/06/22 111.6 kg (246 lb)   07/06/22 112 kg (247 lb)   06/29/22 112 kg (247 lb)   06/24/22 112 kg (247 lb)   06/22/22 112 kg (247 lb)   06/09/22 109.3 kg (241 lb)   06/08/22 109.5 kg (241 lb 8 oz)   05/10/22 112 kg (247 lb)       LABS  Labs reviewed  Labs:  Electrolytes  Potassium (mmol/L)   Date Value   07/27/2022 3.9   07/26/2022 4.4   07/25/2022 4.1   07/24/2022 3.9   05/10/2021 3.6   04/20/2021 4.1   01/13/2021 4.3    Blood Glucose  Glucose (mg/dL)   Date Value   07/25/2022 107 (H)   07/24/2022 90   07/23/2022 97   07/22/2022 104 (H)   06/04/2022 137 (H)   05/10/2021 68 (L)   04/20/2021 148 (H)   01/13/2021 136 (H)   09/09/2020 90   07/14/2020 198 (H)     GLUCOSE BY METER POCT (mg/dL)   Date Value   07/29/2022 98   07/29/2022 118 (H)   07/29/2022 102 (H)   07/28/2022 94   07/28/2022 74     Hemoglobin A1C POCT (%)   Date Value   06/22/2021 6.5 (H)   01/08/2021 8.3 (H)   09/09/2020 6.0 (H)   05/12/2020 7.8 (H)   02/04/2020 8.8 (H)     Hemoglobin A1C (%)   Date Value   06/03/2022 6.6 (H)   01/03/2022 6.1 (H)   09/14/2021 6.2 (H)    Inflammatory Markers  CRP Cardiac Risk (mg/L)   Date Value   02/12/2010 1.3     CRP Inflammation (mg/L)   Date Value   07/27/2022 15.65 (H)   07/26/2022 28.06 (H)   07/22/2022 70.9 (H)   06/03/2022 8.5 (H)   11/07/2019 <2.9   10/31/2019 17.5 (H)   06/26/2019 36.1 (H)     WBC (10e9/L)   Date Value   05/10/2021 6.5   04/20/2021 5.8   07/14/2020 6.5     WBC Count (10e3/uL)   Date Value   07/29/2022 5.4   07/28/2022 6.1   07/27/2022 4.4 "     Albumin (g/dL)   Date Value   05/10/2021 3.6   07/14/2020 4.0   02/04/2020 3.8      Magnesium (mg/dL)   Date Value   08/02/2019 2.1   08/18/2016 2.2     Sodium (mmol/L)   Date Value   07/27/2022 140   07/25/2022 141   07/24/2022 139   05/10/2021 139   04/20/2021 142   01/13/2021 135    Renal  Urea Nitrogen (mg/dL)   Date Value   07/27/2022 13.1   07/25/2022 16   07/24/2022 16   07/23/2022 17   05/10/2021 15   04/20/2021 22   01/13/2021 25     Creatinine (mg/dL)   Date Value   07/27/2022 1.04   07/26/2022 1.14   07/25/2022 1.06   07/25/2022 1.10   05/10/2021 0.86   04/20/2021 0.92   01/13/2021 1.01     Additional  Triglycerides (mg/dL)   Date Value   06/22/2022 58   06/22/2021 42   01/08/2021 93   10/21/2019 78     Ketones Urine (mg/dL)   Date Value   05/10/2021 Negative        MEDICATIONS  Medications reviewed    acetaminophen  975 mg Oral Q8H     amLODIPine  5 mg Oral BID     [START ON 7/30/2022] apixaban ANTICOAGULANT  5 mg Oral BID     [START ON 7/30/2022] aspirin  81 mg Oral Daily     atorvastatin  40 mg Oral Daily     ceFAZolin  2 g Intravenous Q8H     gabapentin  400 mg Oral TID     insulin aspart   Subcutaneous TID AC     insulin aspart  1-7 Units Subcutaneous TID AC     insulin aspart  1-5 Units Subcutaneous At Bedtime     lisinopril  40 mg Oral Daily     metFORMIN  1,000 mg Oral BID w/meals     pantoprazole  40 mg Oral QAM AC     polyethylene glycol  17 g Oral Daily     primidone  50 mg Oral Q8H MELISSA     senna-docusate  1 tablet Oral BID     sodium chloride (PF)  3 mL Intracatheter Q8H        heparin 1,800 Units/hr (07/29/22 0732)      acetaminophen **OR** acetaminophen, bisacodyl, glucose **OR** dextrose **OR** glucagon, hydrALAZINE, insulin aspart, lidocaine 4%, lidocaine (buffered or not buffered), magnesium hydroxide, melatonin, naloxone **OR** naloxone **OR** naloxone **OR** naloxone, ondansetron **OR** ondansetron, oxyCODONE, prochlorperazine **OR** prochlorperazine **OR** prochlorperazine,  senna-docusate **OR** senna-docusate, sodium chloride (PF)     ASSESSED NUTRITION NEEDS PER APPROVED PRACTICE GUIDELINES:  Dosing Weight 105.7 kg  Estimated Energy Needs: 7151-3774+ kcals (14-17 Kcal/Kg)  Justification: maintenance, wound healing, and obese  Estimated Protein Needs: 106-127 grams protein (1-1.2 g pro/Kg)  Justification: maintenance, wound healing and preservation of lean body mass  Estimated Fluid Needs: 1 mL/Kcal    NUTRITION DIAGNOSIS:  Unintended weight loss related to poor appetite as evidenced by severe weight loss, poor intakes <75% x1 month.    NUTRITION INTERVENTIONS  Recommendations / Nutrition Prescription  PRN Glucerna    Implementation  Nutrition education: Provided education on nutrition for wound healing, small frequent meals/snacks  Medical Food Supplement  Ordered new weight  Patient requested MVI/M - unable to order d/t listed niacin allergy    Nutrition Goals  Maintain weight  >50% meal intake TID    MONITORING AND EVALUATION:  Progress towards goals will be monitored and evaluated per protocol and Practice Guidelines      Charlotte Street RDN, LD  Clinical Dietitian

## 2022-07-29 NOTE — PROGRESS NOTES
[place] PODIATRY/FOOT & ANKLE SURGERY  PROGRESS NOTE       ASSESSMENT:  1. S/p right foot partial first ray resection on 7/28/22  2. Bacteremia --> Staph aureus on 7/22     PLAN:   -Patient seen at bedside. States some pain overnight, controlled with tylenol.   -Dressing changed, incision site healthy.   -Await bone cultures to finalize discharge abx plan   -WB to heel of right foot. CAM boot ordered. PT planned for today.   -Cont IV abx  -Will follow     Tiny Soto DPM  [facility] Department of Podiatry/Foot & Ankle Surgery  [phone]      _______________________________________________________________________________________  CHIEF COMPLAINT:      I was asked by Albaro Sandoval DO to evaluate this patient, Crispin Rojas, for suspected right foot abscess and osteomyelitis.    PATIENT HISTORY:  Crispin Rojas is a 62-year-old male seen in follow up for his right foot. Had partial first ray resection yesterday. States some soreness, but manageable. Denies N/F/V/C/D     Review of Systems:  A 10 point review of systems was performed and is positive for that noted above in the patient history.  All other areas are negative.     PAST MEDICAL HISTORY:   Past Medical History:   Diagnosis Date     Cerebral infarction (H)     2010     Chronic infection      H/O blood clots 2022     Hyperlipidemia LDL goal <70      Hypertension      Numbness and tingling      Obesity      GILA (obstructive sleep apnea) 10/22/2018    CPAP intolerant     PVD (peripheral vascular disease) (H)     s/p left partial toe amputation     Renal stone      Tremor      Type 2 diabetes mellitus with diabetic polyneuropathy, with long-term current use of insulin (H)         PAST SURGICAL HISTORY:   Past Surgical History:   Procedure Laterality Date     AMPUTATE FOOT Left 8/18/2016    Procedure: AMPUTATE FOOT;  Surgeon: Jack Younger DPM;  Location: [facility] OR     AMPUTATE TOE(S) Right 2/1/2016    Procedure: AMPUTATE TOE(S);   Surgeon: Rachelle Manriquez DPM, Pod;  Location: [facility] OR     AMPUTATE TOE(S) Right 8/2/2019    Procedure: Right fourth toe partial amputation for treatment of osteomyelitis.;  Surgeon: Jack Younger DPM;  Location: [facility] OR     AMPUTATE TOE(S) Left 11/8/2019    Procedure: Left second toe amputation at the metatarsophalangeal joint.;  Surgeon: Rachelle Manriquez DPM, Podiatry/Foot and Ankle Surgery;  Location: [facility] OR     AMPUTATE TOE(S) Right 6/5/2022    Procedure: AMPUTATION OF RIGHT GREAT TOE;  Surgeon: Wili Quiles DPM;  Location: [facility] OR     ANGIOGRAM       BIOPSY BONE FOOT Right 7/24/2022    Procedure: Bone biopsy, right first metatarsal.;  Surgeon: Valentino Molina DPM;  Location: [facility] OR     COLONOSCOPY       COMBINED CYSTOSCOPY, RETROGRADES, URETEROSCOPY, INSERT STENT Left 8/21/2016    Procedure: COMBINED CYSTOSCOPY, RETROGRADES, URETEROSCOPY, INSERT STENT;  Surgeon: Artemio Valenzuela MD;  Location: [facility] OR     COMBINED CYSTOSCOPY, RETROGRADES, URETEROSCOPY, LASER HOLMIUM LITHOTRIPSY URETER(S), INSERT STENT Left 10/3/2016    Procedure: COMBINED CYSTOSCOPY, RETROGRADES, URETEROSCOPY, LASER HOLMIUM LITHOTRIPSY URETER(S), INSERT STENT;  Surgeon: Artemio Valenzuela MD;  Location: [facility] OR     EXTRACORPOREAL SHOCK WAVE LITHOTRIPSY (ESWL) Left 9/8/2016    Procedure: EXTRACORPOREAL SHOCK WAVE LITHOTRIPSY (ESWL);  Surgeon: Artemio Valenzuela MD;  Location: [facility] OR     INCISION AND DRAINAGE FOOT, COMBINED Right 7/24/2022    Procedure: Incision and drainage, right foot ;  Surgeon: Valentino Molina DPM;  Location: [facility] OR     RECESSION GASTROCNEMIUS Right 2/1/2016    Procedure: RECESSION GASTROCNEMIUS;  Surgeon: Rachelle Manriquez DPM, Pod;  Location: [facility] OR        MEDICATIONS:  Reviewed in Epic.     ALLERGIES:    Allergies   Allergen Reactions     Bactrim [Sulfamethoxazole W/Trimethoprim] Nausea and Vomiting     Sulfa Drugs Nausea     Niacin Swelling     SIMCOR caused edema in extremities     Simvastatin  Swelling     SIMCOR caused edema in extremities        SOCIAL HISTORY:   Social History     Socioeconomic History     Marital status:      Spouse name: Sydney     Number of children: 2     Years of education: Not on file     Highest education level: Master's degree (e.g., MA, MS, [place], MEd, MSW, ELIANA)   Occupational History     Occupation: marketing     Employer: [employer]     Comment: [employer]   Tobacco Use     Smoking status: Never Smoker     Smokeless tobacco: Never Used   Vaping Use     Vaping Use: Never used   Substance and Sexual Activity     Alcohol use: Yes     Alcohol/week: 0.0 standard drinks     Comment: rare---red wine 3x per month     Drug use: No     Sexual activity: Not Currently     Partners: Female   Other Topics Concern     Parent/sibling w/ CABG, MI or angioplasty before 65F 55M? No   Social History Narrative     Not on file     Social Determinants of Health     Financial Resource Strain: Low Risk      Difficulty of Paying Living Expenses: Not very hard   Food Insecurity: No Food Insecurity     Worried About Running Out of Food in the Last Year: Never true     Ran Out of Food in the Last Year: Never true   Transportation Needs: No Transportation Needs     Lack of Transportation (Medical): No     Lack of Transportation (Non-Medical): No   Physical Activity: Sufficiently Active     Days of Exercise per Week: 4 days     Minutes of Exercise per Session: 40 min   Stress: No Stress Concern Present     Feeling of Stress : Not at all   Social Connections: Moderately Isolated     Frequency of Communication with Friends and Family: Once a week     Frequency of Social Gatherings with Friends and Family: Never     Attends Rastafarian Services: More than 4 times per year     Active Member of Clubs or Organizations: No     Attends Club or Organization Meetings: Not on file     Marital Status:    Intimate Partner Violence: Not on file   Housing Stability: Low Risk      Unable to Pay for Housing in  "the Last Year: No     Number of Places Lived in the Last Year: 2     Unstable Housing in the Last Year: No        FAMILY HISTORY:   Family History   Problem Relation Age of Onset     Arthritis Mother         SLE     Connective Tissue Disorder Mother         lupus     Diabetes Mother      Cerebrovascular Disease Mother      Cancer Mother      Diabetes Father      Diabetes Maternal Grandfather      Diabetes Sister         EXAM:  Vitals: /74   Pulse 69   Temp 97.9  F (36.6  C) (Oral)   Resp 19   Ht 1.803 m (5' 11\")   Wt 105.7 kg (233 lb)   SpO2 95%   BMI 32.50 kg/m    BMI= Body mass index is 32.5 kg/m .    LABS:   Lab Results   Component Value Date    A1C 6.6 06/03/2022    A1C 6.1 01/03/2022    A1C 6.2 09/14/2021    A1C 6.5 06/22/2021    A1C 8.3 01/08/2021    A1C 6.0 09/09/2020    A1C 7.8 05/12/2020    A1C 8.8 02/04/2020       Lab Results   Component Value Date    INR 1.08 11/07/2019    INR 1.04 08/19/2013       Lab Results   Component Value Date    WBC 5.5 07/23/2022    WBC 6.5 05/10/2021     Lab Results   Component Value Date    HGB 10.3 07/23/2022    HGB 12.1 05/10/2021     Lab Results   Component Value Date     07/23/2022     05/10/2021       All cultures:  Recent Labs   Lab 07/27/22  0918 07/27/22  0915 07/26/22  1341 07/26/22  1339 07/24/22  1128 07/24/22  0020 07/22/22  1929 07/22/22  1708   CULTURE No growth after 1 day No growth after 1 day No growth after 2 days No growth after 2 days No anaerobic organisms isolated after 4 days  1+ Staphylococcus aureus* No Growth  No Growth Positive on the 1st day of incubation*  Staphylococcus hominis* Positive on the 1st day of incubation*  Staphylococcus aureus*     General appearance:    Crispin is alert and fully cooperative with history & exam.  No sign of distress is noted during the visit.      Psychiatric: Affect is pleasant & appropriate.  Crispin is motivated to improve health.       Respiratory: Breathing is regular & unlabored " while sitting.      HEENT: Hearing is intact to spoken word.  Speech is clear.  No gross evidence of visual impairment that would impact ambulation.      Dermatologic: Dressing changed on right foot. Some noted sanguinous drainage, well controlled. No cellulitis or dehiscence. Healthy periwound. Nontender.      Vascular: Dorsalis pedis and posterior tibial pulses are strongly palpable, right foot.      Neurologic: Lower extremity sensation is diminished, bilateral foot, to light touch.  No evidence of neurological-based weakness or contracture in the lower extremities.       Musculoskeletal: Patient is ambulatory without an assistive device or brace.  Digital contractures.  The right hallux has been amputated.  The left hallux and second toes are amputated.    IMAGING:   Right foot xrays: Partial resection of first metatarsal. Post op air. No residual osseous fragments.     Culture:   Foot, Right; Bone Biopsy          0 Result Notes    Culture 1+ Staphylococcus aureus Abnormal            Pathology:   Final Diagnosis   Bone, right first metatarsal, biopsy:  - Consistent with chronic osteomyelitis.  - Negative for malignancy.  - Microbiology studies from this procedure will be reported separately by the performing laboratory; correlation with those results is recommended.        Culture 7/28/22  Foot, Right; Bone Resection          0 Result Notes    Gram Stain Result   Abnormal   1+ Gram negative bacilli      1+ WBC seen

## 2022-07-29 NOTE — PLAN OF CARE
INPT NOTE [id number]    [place]: A&Ox4  LS: clear, room air, denies SOB  Cardiac: WNL  GI: WNL - last BM 7/25/2022 (pt states this is normal for him and declining senokot and further intervention - agreeable to miralax only)   : WNL  Skin: WNL ex - incision RLE, ace wrapped and elevated  Activity: SBA, heel WB on RLE  Diet: regular diet, BG ACHS and prn  Pain: denies  Plan: on IV ancef, PT/podiatry/ID following, hep gtt for RLE DVT, waiting on cultures

## 2022-07-29 NOTE — PROGRESS NOTES
[facility]    Infectious Disease Progress Note    Date of Service : 07/29/2022     Assessment:  1.  A 62-year-old male, readmitted with again worsening right great toe infection previously 2 amputations, felt fully resected, but now apparent recurrent infection and at surgery, evidence of soft bone.  Biopsy and cultures are pending.  Previous cultures had mixed josé, prominently Staphylococcus aureus.  3.  Current admission minimal systemic sepsis, but some prominent fatigue over the last several weeks.  One of blood cultures at admission growing sensitive Staphylococcus aureus, presumably coming from the foot.  No evidence of any secondary involved sites, no artificial material, etc.  4.  Prior history of recurrent diabetic foot infection and osteomyelitis that was felt fully resected when last seen in early June.  5.  Prior deep venous thrombosis recently.  6.  Diabetes mellitus.  7.  SULFA ALLERGY, BUT WAS PRIMARILY GI INTOLERANCE.       Recommendations:  1. Midline   2. IV antibiotics at discharge (Cefazolin) for at least 2 weeks (from negative blood cxs 7/24)for MSSA bacteremia  3. Follow tissue pathology  OPIV antibiotic orders placed in Epic until 8/7      Abby Lozada MD    Interval History   Patient was seen and examined, chart reviewed. Resting, no new complaints, tolerating antibiotics without side effects.    Physical Exam   Temp: 98.1  F (36.7  C) Temp src: Oral BP: 115/56 Pulse: 61   Resp: 18 SpO2: 95 % O2 Device: None (Room air) Oxygen Delivery: 2 LPM  Vitals:    07/22/22 2100   Weight: 105.7 kg (233 lb)     Vital Signs with Ranges  Temp:  [97.5  F (36.4  C)-98.3  F (36.8  C)] 98.1  F (36.7  C)  Pulse:  [58-69] 61  Resp:  [13-19] 18  BP: (115-163)/(56-78) 115/56  SpO2:  [95 %-98 %] 95 %    Constitutional: Awake, alert, cooperative, no apparent distress  Lungs: Clear to auscultation bilaterally, no crackles or wheezing  Cardiovascular: Regular rate and rhythm, normal S1 and S2,  and no murmur noted  Abdomen: Normal bowel sounds, soft, non-distended, non-tender  MS : r foot in surgical dressing    Other:    Medications     heparin 1,800 Units/hr (07/29/22 1734)       acetaminophen  975 mg Oral Q8H     amLODIPine  5 mg Oral BID     [START ON 7/30/2022] apixaban ANTICOAGULANT  5 mg Oral BID     [START ON 7/30/2022] aspirin  81 mg Oral Daily     atorvastatin  40 mg Oral Daily     ceFAZolin  2 g Intravenous Q8H     gabapentin  400 mg Oral TID     insulin aspart   Subcutaneous TID AC     insulin aspart  1-7 Units Subcutaneous TID AC     insulin aspart  1-5 Units Subcutaneous At Bedtime     lisinopril  40 mg Oral Daily     metFORMIN  1,000 mg Oral BID w/meals     pantoprazole  40 mg Oral QAM AC     polyethylene glycol  17 g Oral Daily     primidone  50 mg Oral Q8H MELISSA     senna-docusate  1 tablet Oral BID     sodium chloride (PF)  3 mL Intracatheter Q8H       Data   All microbiology laboratory data reviewed.  Recent Labs   Lab Test 07/29/22  0518 07/28/22  2352 07/27/22  0909   WBC 5.4 6.1 4.4   HGB 10.4* 10.4* 10.6*   HCT 34.3* 34.2* 34.6*   MCV 90 92 90    206 229     Recent Labs   Lab Test 07/27/22  0909 07/26/22  0600 07/25/22  0548   CR 1.04 1.14 1.10  1.06     Recent Labs   Lab Test 07/26/22  1259   SED 93*

## 2022-07-30 LAB
ANION GAP SERPL CALCULATED.3IONS-SCNC: 8 MMOL/L (ref 7–15)
BASOPHILS # BLD AUTO: 0 10E3/UL (ref 0–0.2)
BASOPHILS NFR BLD AUTO: 1 %
BUN SERPL-MCNC: 14.4 MG/DL (ref 8–23)
CALCIUM SERPL-MCNC: 8.6 MG/DL (ref 8.8–10.2)
CHLORIDE SERPL-SCNC: 106 MMOL/L (ref 98–107)
CREAT SERPL-MCNC: 0.95 MG/DL (ref 0.67–1.17)
DEPRECATED HCO3 PLAS-SCNC: 25 MMOL/L (ref 22–29)
EOSINOPHIL # BLD AUTO: 0.2 10E3/UL (ref 0–0.7)
EOSINOPHIL NFR BLD AUTO: 4 %
ERYTHROCYTE [DISTWIDTH] IN BLOOD BY AUTOMATED COUNT: 13.8 % (ref 10–15)
GFR SERPL CREATININE-BSD FRML MDRD: >90 ML/MIN/1.73M2
GLUCOSE BLDC GLUCOMTR-MCNC: 100 MG/DL (ref 70–99)
GLUCOSE BLDC GLUCOMTR-MCNC: 103 MG/DL (ref 70–99)
GLUCOSE BLDC GLUCOMTR-MCNC: 105 MG/DL (ref 70–99)
GLUCOSE BLDC GLUCOMTR-MCNC: 107 MG/DL (ref 70–99)
GLUCOSE BLDC GLUCOMTR-MCNC: 108 MG/DL (ref 70–99)
GLUCOSE BLDC GLUCOMTR-MCNC: 115 MG/DL (ref 70–99)
GLUCOSE BLDC GLUCOMTR-MCNC: 118 MG/DL (ref 70–99)
GLUCOSE BLDC GLUCOMTR-MCNC: 121 MG/DL (ref 70–99)
GLUCOSE SERPL-MCNC: 108 MG/DL (ref 70–99)
HCT VFR BLD AUTO: 32.3 % (ref 40–53)
HGB BLD-MCNC: 9.9 G/DL (ref 13.3–17.7)
IMM GRANULOCYTES # BLD: 0 10E3/UL
IMM GRANULOCYTES NFR BLD: 0 %
LYMPHOCYTES # BLD AUTO: 1.4 10E3/UL (ref 0.8–5.3)
LYMPHOCYTES NFR BLD AUTO: 31 %
MCH RBC QN AUTO: 27.6 PG (ref 26.5–33)
MCHC RBC AUTO-ENTMCNC: 30.7 G/DL (ref 31.5–36.5)
MCV RBC AUTO: 90 FL (ref 78–100)
MONOCYTES # BLD AUTO: 0.3 10E3/UL (ref 0–1.3)
MONOCYTES NFR BLD AUTO: 7 %
NEUTROPHILS # BLD AUTO: 2.7 10E3/UL (ref 1.6–8.3)
NEUTROPHILS NFR BLD AUTO: 57 %
NRBC # BLD AUTO: 0 10E3/UL
NRBC BLD AUTO-RTO: 0 /100
PLATELET # BLD AUTO: 180 10E3/UL (ref 150–450)
POTASSIUM SERPL-SCNC: 4.3 MMOL/L (ref 3.4–5.3)
RBC # BLD AUTO: 3.59 10E6/UL (ref 4.4–5.9)
SARS-COV-2 RNA RESP QL NAA+PROBE: NEGATIVE
SODIUM SERPL-SCNC: 139 MMOL/L (ref 136–145)
UFH PPP CHRO-ACNC: 0.36 IU/ML
WBC # BLD AUTO: 4.7 10E3/UL (ref 4–11)

## 2022-07-30 PROCEDURE — 36415 COLL VENOUS BLD VENIPUNCTURE: CPT | Performed by: STUDENT IN AN ORGANIZED HEALTH CARE EDUCATION/TRAINING PROGRAM

## 2022-07-30 PROCEDURE — U0003 INFECTIOUS AGENT DETECTION BY NUCLEIC ACID (DNA OR RNA); SEVERE ACUTE RESPIRATORY SYNDROME CORONAVIRUS 2 (SARS-COV-2) (CORONAVIRUS DISEASE [COVID-19]), AMPLIFIED PROBE TECHNIQUE, MAKING USE OF HIGH THROUGHPUT TECHNOLOGIES AS DESCRIBED BY CMS-2020-01-R: HCPCS | Performed by: PHYSICIAN ASSISTANT

## 2022-07-30 PROCEDURE — 120N000001 HC R&B MED SURG/OB

## 2022-07-30 PROCEDURE — 85520 HEPARIN ASSAY: CPT | Performed by: STUDENT IN AN ORGANIZED HEALTH CARE EDUCATION/TRAINING PROGRAM

## 2022-07-30 PROCEDURE — 250N000013 HC RX MED GY IP 250 OP 250 PS 637: Performed by: PODIATRIST

## 2022-07-30 PROCEDURE — 99232 SBSQ HOSP IP/OBS MODERATE 35: CPT | Performed by: PHYSICIAN ASSISTANT

## 2022-07-30 PROCEDURE — 36569 INSJ PICC 5 YR+ W/O IMAGING: CPT

## 2022-07-30 PROCEDURE — 80048 BASIC METABOLIC PNL TOTAL CA: CPT | Performed by: PHYSICIAN ASSISTANT

## 2022-07-30 PROCEDURE — 250N000013 HC RX MED GY IP 250 OP 250 PS 637: Performed by: PHYSICIAN ASSISTANT

## 2022-07-30 PROCEDURE — 85025 COMPLETE CBC W/AUTO DIFF WBC: CPT | Performed by: PHYSICIAN ASSISTANT

## 2022-07-30 PROCEDURE — 250N000011 HC RX IP 250 OP 636: Performed by: INTERNAL MEDICINE

## 2022-07-30 PROCEDURE — 272N000727 HC KIT, CATH 5FR  DUAL LUMEN POWERMIDLINE

## 2022-07-30 RX ADMIN — GABAPENTIN 400 MG: 400 CAPSULE ORAL at 14:32

## 2022-07-30 RX ADMIN — CEFAZOLIN SODIUM 2 G: 2 INJECTION, SOLUTION INTRAVENOUS at 06:06

## 2022-07-30 RX ADMIN — POLYETHYLENE GLYCOL 3350 17 G: 17 POWDER, FOR SOLUTION ORAL at 08:36

## 2022-07-30 RX ADMIN — METFORMIN HYDROCHLORIDE 1000 MG: 500 TABLET, EXTENDED RELEASE ORAL at 18:09

## 2022-07-30 RX ADMIN — ASPIRIN 81 MG: 81 TABLET, COATED ORAL at 08:34

## 2022-07-30 RX ADMIN — LISINOPRIL 40 MG: 40 TABLET ORAL at 08:36

## 2022-07-30 RX ADMIN — SENNOSIDES AND DOCUSATE SODIUM 1 TABLET: 50; 8.6 TABLET ORAL at 08:35

## 2022-07-30 RX ADMIN — APIXABAN 5 MG: 5 TABLET, FILM COATED ORAL at 08:36

## 2022-07-30 RX ADMIN — CEFAZOLIN SODIUM 2 G: 2 INJECTION, SOLUTION INTRAVENOUS at 22:19

## 2022-07-30 RX ADMIN — ATORVASTATIN CALCIUM 40 MG: 40 TABLET, FILM COATED ORAL at 08:36

## 2022-07-30 RX ADMIN — PANTOPRAZOLE SODIUM 40 MG: 40 TABLET, DELAYED RELEASE ORAL at 06:04

## 2022-07-30 RX ADMIN — SENNOSIDES AND DOCUSATE SODIUM 1 TABLET: 50; 8.6 TABLET ORAL at 20:02

## 2022-07-30 RX ADMIN — GABAPENTIN 400 MG: 400 CAPSULE ORAL at 08:34

## 2022-07-30 RX ADMIN — AMLODIPINE BESYLATE 5 MG: 5 TABLET ORAL at 20:02

## 2022-07-30 RX ADMIN — AMLODIPINE BESYLATE 5 MG: 5 TABLET ORAL at 08:36

## 2022-07-30 RX ADMIN — PRIMIDONE 50 MG: 50 TABLET ORAL at 08:35

## 2022-07-30 RX ADMIN — METFORMIN HYDROCHLORIDE 1000 MG: 500 TABLET, EXTENDED RELEASE ORAL at 08:34

## 2022-07-30 RX ADMIN — CEFAZOLIN SODIUM 2 G: 2 INJECTION, SOLUTION INTRAVENOUS at 14:32

## 2022-07-30 RX ADMIN — APIXABAN 5 MG: 5 TABLET, FILM COATED ORAL at 20:01

## 2022-07-30 RX ADMIN — GABAPENTIN 400 MG: 400 CAPSULE ORAL at 20:01

## 2022-07-30 RX ADMIN — PRIMIDONE 50 MG: 50 TABLET ORAL at 20:01

## 2022-07-30 RX ADMIN — PRIMIDONE 50 MG: 50 TABLET ORAL at 14:32

## 2022-07-30 ASSESSMENT — ACTIVITIES OF DAILY LIVING (ADL)
ADLS_ACUITY_SCORE: 21

## 2022-07-30 NOTE — PROGRESS NOTES
[facility]  PROGRESS NOTE       ASSESSMENT:  1. S/p right foot partial first ray resection on 7/28/22  2. Bacteremia --> Staph aureus on 7/22     PLAN:   -Patient seen at bedside. States doing well and less pain compared to preop.   -Dressing changed, incision site healthy.   -Await bone cultures to finalize discharge abx plan. No growth so far. Gram Neg Bacilii on gram stain.   -WB to heel of right foot. CAM boot is comfortable   -Left foot examined today. Preulcerative lesions. Nothing open.   -Cont IV abx --> midline now placed   -Will follow --> discharge once bone cultures are final     Tiny Soto DPM  [facility] Department of Podiatry/Foot & Ankle Surgery  [phone]      _______________________________________________________________________________________  CHIEF COMPLAINT:      I was asked by Albaro Sandoval DO to evaluate this patient, Crispin Rojas, for suspected right foot abscess and osteomyelitis.    PATIENT HISTORY:  Crispin Rojas is a 62-year-old male seen in follow up for his right foot. States doing well and minimal pain with WB. Denies N/F/V/C/D     Review of Systems:  A 10 point review of systems was performed and is positive for that noted above in the patient history.  All other areas are negative.     PAST MEDICAL HISTORY:   Past Medical History:   Diagnosis Date     Cerebral infarction (H)     2010     Chronic infection      H/O blood clots 2022     Hyperlipidemia LDL goal <70      Hypertension      Numbness and tingling      Obesity      GILA (obstructive sleep apnea) 10/22/2018    CPAP intolerant     PVD (peripheral vascular disease) (H)     s/p left partial toe amputation     Renal stone      Tremor      Type 2 diabetes mellitus with diabetic polyneuropathy, with long-term current use of insulin (H)         PAST SURGICAL HISTORY:   Past Surgical History:   Procedure Laterality Date     AMPUTATE FOOT Left 8/18/2016    Procedure: AMPUTATE  FOOT;  Surgeon: Jack Younger DPM;  Location: [facility] OR     AMPUTATE TOE(S) Right 2/1/2016    Procedure: AMPUTATE TOE(S);  Surgeon: Rachelle Manriquez DPM, Pod;  Location: [facility]     AMPUTATE TOE(S) Right 8/2/2019    Procedure: Right fourth toe partial amputation for treatment of osteomyelitis.;  Surgeon: Jack Younger DPM;  Location: [facility]     AMPUTATE TOE(S) Left 11/8/2019    Procedure: Left second toe amputation at the metatarsophalangeal joint.;  Surgeon: Rachelle Manriquez DPM, Podiatry/Foot and Ankle Surgery;  Location: [facility]     AMPUTATE TOE(S) Right 6/5/2022    Procedure: AMPUTATION OF RIGHT GREAT TOE;  Surgeon: Wili Quiles DPM;  Location: [facility]     AMPUTATE TOE(S) Right 7/28/2022    Procedure: Right foot partial first metatarsal resection;  Surgeon: Tiny Soto DPM;  Location: [facility] OR     ANGIOGRAM       BIOPSY BONE FOOT Right 7/24/2022    Procedure: Bone biopsy, right first metatarsal.;  Surgeon: Valentino Molina DPM;  Location: [facility]     COLONOSCOPY       COMBINED CYSTOSCOPY, RETROGRADES, URETEROSCOPY, INSERT STENT Left 8/21/2016    Procedure: COMBINED CYSTOSCOPY, RETROGRADES, URETEROSCOPY, INSERT STENT;  Surgeon: Artemio Valenzuela MD;  Location: [facility]     COMBINED CYSTOSCOPY, RETROGRADES, URETEROSCOPY, LASER HOLMIUM LITHOTRIPSY URETER(S), INSERT STENT Left 10/3/2016    Procedure: COMBINED CYSTOSCOPY, RETROGRADES, URETEROSCOPY, LASER HOLMIUM LITHOTRIPSY URETER(S), INSERT STENT;  Surgeon: Artemio Valenzuela MD;  Location: [facility]     EXTRACORPOREAL SHOCK WAVE LITHOTRIPSY (ESWL) Left 9/8/2016    Procedure: EXTRACORPOREAL SHOCK WAVE LITHOTRIPSY (ESWL);  Surgeon: Artemio Valenzuela MD;  Location: [facility]     INCISION AND DRAINAGE FOOT, COMBINED Right 7/24/2022    Procedure: Incision and drainage, right foot ;  Surgeon: Valentino Molina DPM;  Location: [facility]     RECESSION GASTROCNEMIUS Right 2/1/2016    Procedure: RECESSION GASTROCNEMIUS;  Surgeon: Rachelle Manriquez DPM, Pod;   Location: [facility]        MEDICATIONS:  Reviewed in Epic.     ALLERGIES:    Allergies   Allergen Reactions     Bactrim [Sulfamethoxazole W/Trimethoprim] Nausea and Vomiting     Sulfa Drugs Nausea     Niacin Swelling     SIMCOR caused edema in extremities     Simvastatin Swelling     SIMCOR caused edema in extremities        SOCIAL HISTORY:   Social History     Socioeconomic History     Marital status:      Spouse name: Sydney     Number of children: 2     Years of education: Not on file     Highest education level: Master's degree (e.g., MA, MS, [place], MEd, MSW, ELIANA)   Occupational History     Occupation: marketing     Employer: [employer]     Comment: [employer]   Tobacco Use     Smoking status: Never Smoker     Smokeless tobacco: Never Used   Vaping Use     Vaping Use: Never used   Substance and Sexual Activity     Alcohol use: Yes     Alcohol/week: 0.0 standard drinks     Comment: rare---red wine 3x per month     Drug use: No     Sexual activity: Not Currently     Partners: Female   Other Topics Concern     Parent/sibling w/ CABG, MI or angioplasty before 65F 55M? No   Social History Narrative     Not on file     Social Determinants of Health     Financial Resource Strain: Low Risk      Difficulty of Paying Living Expenses: Not very hard   Food Insecurity: No Food Insecurity     Worried About Running Out of Food in the Last Year: Never true     Ran Out of Food in the Last Year: Never true   Transportation Needs: No Transportation Needs     Lack of Transportation (Medical): No     Lack of Transportation (Non-Medical): No   Physical Activity: Sufficiently Active     Days of Exercise per Week: 4 days     Minutes of Exercise per Session: 40 min   Stress: No Stress Concern Present     Feeling of Stress : Not at all   Social Connections: Moderately Isolated     Frequency of Communication with Friends and Family: Once a week     Frequency of Social Gatherings with Friends and Family: Never     Attends Catholic  "Services: More than 4 times per year     Active Member of Clubs or Organizations: No     Attends Club or Organization Meetings: Not on file     Marital Status:    Intimate Partner Violence: Not on file   Housing Stability: Low Risk      Unable to Pay for Housing in the Last Year: No     Number of Places Lived in the Last Year: 2     Unstable Housing in the Last Year: No        FAMILY HISTORY:   Family History   Problem Relation Age of Onset     Arthritis Mother         SLE     Connective Tissue Disorder Mother         lupus     Diabetes Mother      Cerebrovascular Disease Mother      Cancer Mother      Diabetes Father      Diabetes Maternal Grandfather      Diabetes Sister         EXAM:  Vitals: /73 (BP Location: Left arm)   Pulse 57   Temp 97.5  F (36.4  C) (Oral)   Resp 18   Ht 1.803 m (5' 11\")   Wt 105.7 kg (233 lb)   SpO2 93%   BMI 32.50 kg/m    BMI= Body mass index is 32.5 kg/m .    LABS:   Lab Results   Component Value Date    A1C 6.6 06/03/2022    A1C 6.1 01/03/2022    A1C 6.2 09/14/2021    A1C 6.5 06/22/2021    A1C 8.3 01/08/2021    A1C 6.0 09/09/2020    A1C 7.8 05/12/2020    A1C 8.8 02/04/2020       Lab Results   Component Value Date    INR 1.08 11/07/2019    INR 1.04 08/19/2013       Lab Results   Component Value Date    WBC 5.5 07/23/2022    WBC 6.5 05/10/2021     Lab Results   Component Value Date    HGB 10.3 07/23/2022    HGB 12.1 05/10/2021     Lab Results   Component Value Date     07/23/2022     05/10/2021       All cultures:  Recent Labs   Lab 07/28/22  1410 07/27/22  0918 07/27/22  0915 07/26/22  1341 07/26/22  1339 07/24/22  1128 07/24/22  0020   CULTURE Culture in progress  No anaerobic organisms isolated after 1 day No growth after 2 days No growth after 2 days No growth after 3 days No growth after 3 days No anaerobic organisms isolated after 5 days  1+ Staphylococcus aureus* No Growth  No Growth     General appearance:    Crispin is alert and fully " cooperative with history & exam.  No sign of distress is noted during the visit.      Psychiatric: Affect is pleasant & appropriate.  Crispin is motivated to improve health.       Respiratory: Breathing is regular & unlabored while sitting.      HEENT: Hearing is intact to spoken word.  Speech is clear.  No gross evidence of visual impairment that would impact ambulation.      Dermatologic: Dressing changed on right foot. Some noted sanguinous drainage, well controlled. No cellulitis or dehiscence. Healthy periwound. Nontender.   Left foot: submet one and submet 2 calluses, no open lesions or ulcerations to site.      Vascular: Dorsalis pedis and posterior tibial pulses are strongly palpable, right foot.      Neurologic: Lower extremity sensation is diminished, bilateral foot, to light touch.  No evidence of neurological-based weakness or contracture in the lower extremities.       Musculoskeletal: Patient is ambulatory without an assistive device or brace.  Digital contractures.  The right hallux has been amputated.  The left hallux and second toes are amputated.    IMAGING:   Right foot xrays: Partial resection of first metatarsal. Post op air. No residual osseous fragments.     Culture:   Foot, Right; Bone Biopsy          0 Result Notes    Culture 1+ Staphylococcus aureus Abnormal            Pathology:   Final Diagnosis   Bone, right first metatarsal, biopsy:  - Consistent with chronic osteomyelitis.  - Negative for malignancy.  - Microbiology studies from this procedure will be reported separately by the performing laboratory; correlation with those results is recommended.        Culture 7/28/22  Foot, Right; Bone Resection          0 Result Notes    Gram Stain Result   Abnormal   1+ Gram negative bacilli      1+ WBC seen

## 2022-07-30 NOTE — PLAN OF CARE
"[id number]    Inpatient Progress Note:    /65 (BP Location: Left arm)   Pulse 75   Temp 97.8  F (36.6  C) (Oral)   Resp 18   Ht 1.803 m (5' 11\")   Wt 105.7 kg (233 lb)   SpO2 95%   BMI 32.50 kg/m         Orientation: A & O X 4  Neuro: Intact  Pain status: Denies pain  Activity: SBA X 1  Peripheral edema:   Resp: WDL  Cardiac: WDL  GI: WDL  :  WDL  Skin:   LDA: IV to left arm  Infusions: ABX & Heparin   Pertinent Labs: Heparin Xa this morning   Diet: Regular    Discharge Plan: 7/31.    Patient A & O  X 4. VSS -WDL. Patient  on RA. Stand by assist . WB as tolerated on the right foot. On Heparin drip & Cefazolin ABX. Pt is on regular diet. Pt had a shower this shift. Dressing on the right CDI. Patient on Heparin and Potassium protocol. Heparin Xa draw this morning.      Will continue to monitor and provide cares.     Parker Rosales RN          "

## 2022-07-30 NOTE — PROCEDURES
[facility]    Single Lumen Midline Placement    Date/Time: 7/30/2022 10:03 AM  Performed by: Rober Kulkarni RN  Authorized by: Abby Lozada MD   Indications: vascular access      UNIVERSAL PROTOCOL   Site Marked: Yes  Prior Images Obtained and Reviewed:  Yes  Required items: Required blood products, implants, devices and special equipment available    Patient identity confirmed:  Verbally with patient, arm band and hospital-assigned identification number  Patient was reevaluated immediately before administering moderate or deep sedation or anesthesia  Confirmation Checklist:  Patient's identity using two indicators, relevant allergies, procedure was appropriate and matched the consent or emergent situation and correct equipment/implants were available  Time out: Immediately prior to the procedure a time out was called    Universal Protocol: the Joint Commission Universal Protocol was followed    Preparation: Patient was prepped and draped in usual sterile fashion       ANESTHESIA    Anesthesia: Local infiltration  Local Anesthetic:  Lidocaine 1% without epinephrine  Anesthetic Total (mL):  1      SEDATION    Patient Sedated: No        Preparation: skin prepped with ChloraPrep  Skin prep agent: skin prep agent completely dried prior to procedure  Sterile barriers: maximum sterile barriers were used: cap, mask, sterile gown, sterile gloves, and large sterile sheet  Hand hygiene: hand hygiene performed prior to central venous catheter insertion  Type of line used: Midline  Catheter type: single lumen  Lumen type: non-valved  Catheter size: 4 Fr  Brand: Bard  Lot number: [id number]  Placement method: venipuncture, MST and ultrasound  Number of attempts: 1  Difficulty threading catheter: no  Successful placement: yes  Orientation: left  Catheter to Vein (%): 12  Location: cephalic vein (3.9mm)  Tip Location: right atrium  Arm circumference: adults 10 cm  Extremity circumference: 32.5  Visible catheter  length: 2  Internal length: 12 cm  Total catheter length: 14  Dressing and securement: alcohol impregnated caps, blood cleaned with CHG, blood removed, chlorhexidine disc applied, sterile dressing applied and subcutaneous anchor securement system  Post procedure assessment: blood return through all ports  PROCEDURE   Patient Tolerance:  Patient tolerated the procedure well with no immediate complications     Midline ok to use

## 2022-07-30 NOTE — PROGRESS NOTES
[facility]    Medicine Progress Note - Hospitalist Service    Date of Admission:  7/22/2022    Assessment & Plan          Assessment & Plan: Crispin Rojas is a 62 year old male with past medical history significant for type 2 diabetes mellitus, hypertension, hyperlipidemia, CVA, GILA, obesity, PVD, and recent right great toe amputation 06/2022 with persistent nonhealing surgical wound who presented to [facility] on 7/22/2022 with increased right foot pain and radiographic evidence of continued osteomyelitis with associated pathologic fracture and acute abscess.     Right Foot Ulcer with Osteomyelitis  POD #6 I/D right foot with bone biopsy of the right first metatarsal and POD #2 R partial 1st metatarsal resection  Staph Aureus Bacteremia   - pt with a recent right great toe amputation 06/2022 with persistent nonhealing surgical wound who presented to [facility] on 7/22/2022 with increased right foot pain and radiographic evidence of continued osteomyelitis with associated pathologic fracture and acute abscess.  Blood cultures positive on 7/22 for Staph Aureus and Staph Hominis.  Bone bx cx 7/24 positive for Staph Aureus  - Podiatry and ID following  - POD #2 R partial 1st metatarsal resection  - follow serial blood cultures, negative since 7/24  - discontinued Zosyn and Vancomycin on 7/27/2022.    - started on Ancef on 7/27/22   - midline placed this morning and extended IV abx  - Podiatry wants to wait for bone cultures from 7/28 to result prior to discharge   - PT consulted, recommend home with assist.   - SW consulted for discharge planning.  If plan to discharge home, will need outpatient abx set up.      Type 2 Diabetes Mellitus  Peripheral Neuropathy   - diabetes diagnosed in 1990.  Hgb A1C 6.6 (6/2022).  BS have been .  Pt with a poor appetite.     - pta Metformin, Ozempic and home sliding scale insulin have been on hold.  - pt has not taken his pta  Tresiba for a week pta due to poor appetite and this held on admission due to npo status for surgery.  Even when tolerating po this was not resumed due to BS of 90-100s  Continue to hold.  - pt reports glucose spikes with abx administration, would like correction for this. On review, glucose spikes between 120-140, which is reasonable but this is high according to the patient.    - ok for 2 units following abx admin if glucose is >100. Pt can participate in decision making regarding insulin administration  - resume carb correction with meals, 3 units per 10 carbs. Pt can participate in decision making regarding insulin administration  - appetite is improving, Metformin resumed. Continue holding home Ozempic and Tresiba, hold on discharge as well unless glucose starts to rise. Discussed with pt as well  - continue pta Gabapentin  - continue ISS protocol     Hx RLE DVT - (6/3/2022) RLE Doppler U/S revealed a subocclusive DVT within the right posterior tibial vein.  Likely provoked in the setting of right toe infection at that time.  Discharged on Eliquis with plan for 3 months of therapy. Eliquis held on this admission due to surgery.  RLE Doppler U/S on 7/26/22 revealed a new DVT in one of the paired right gastrocnemius veins and resolution of DVT in the posterior tibial vein.   - started on a heparin gtt on 7/26 due to surgery 7/28  - heparin held for surgery 7/28, resumed afterwards at midnight  - resume pta Eliquis this morning, after 24 hours of heparin, stop heparin     Chronic Anemia - hgb 10.6, baseline 11. Recheck today     HTN - continue pta Amlodipine and Lisinopril.  Holding prn Lasix and hydrochlorothiazide with poor oral intake. BPs stable, oral intake slowly improving.   - -130s today, consider resuming hydrochlorothiazide tomorrow morning     GILA - patient does not tolerate cpap.  Symptoms improved after weight loss.  Monitor     GERD - continue pta Omeprazole     Peripheral Vascular Disease -  pta ASA held, resume in AM.  Continue Atorvastatin     Hx CVA - 2010.  No residual deficits other than difficulty with remembering faces/names. PTA ASA held prior to surgery, resumed.  Continue Atorvastatin.     Essential Tremor - continue pta Primidone     Obesity, BMI 32    Covid-19 negative 7/22, retest today     Diet: Regular Diet Adult  Snacks/Supplements Adult: Glucerna; With Meals    DVT Prophylaxis: DOAC and Ambulate every shift  Mccarthy Catheter: Not present  Central Lines: PRESENT     Cardiac Monitoring: None  Code Status: Full Code      Disposition Plan      Expected Discharge Date: 07/31/2022    Discharge Delays: IV Medication - consider oral or Home Infusion  PT Disposition recs needed  Destination: home with family          The patient's care was discussed with the Bedside Nurse, Patient and Podiatry Consultant.    Alethea Penny PA-C  Hospitalist Service  [facility]  Securely message with the Vocera Web Console (learn more here)  Text page via [employer] Paging/Directory         Clinically Significant Risk Factors Present on Admission                      ______________________________________________________________________    Interval History   Doing well, minimal pain. Glucose well controlled. Wanting to walk today    Data reviewed today: I reviewed all medications, new labs and imaging results over the last 24 hours. I personally reviewed no images or EKG's today.    Physical Exam   Vital Signs: Temp: 97.5  F (36.4  C) Temp src: Oral BP: 132/73 Pulse: 57   Resp: 18 SpO2: 93 % O2 Device: None (Room air)    Weight: 233 lbs 0 oz    GENERAL:  Comfortable.  PSYCH: pleasant, oriented, No acute distress.  HEART:  RRR  LUNGS:  Normal Respiratory effort.   EXTREMITIES:  R foot dressed, +2 pulses bilateral and equal.  SKIN:  Dry to touch, No rash, wound or ulcerations.  NEUROLOGIC:  Grossly intact      Data   Recent Labs   Lab 07/30/22  0602 07/30/22  0246 07/29/22  2255 07/29/22  0818  07/29/22  0518 07/29/22  0255 07/28/22  2352 07/27/22  1313 07/27/22  0909 07/26/22  0725 07/26/22  0600 07/25/22  0628 07/25/22  0548 07/24/22  0700 07/24/22  0600   WBC  --   --   --   --  5.4  --  6.1  --  4.4  --   --    < >  --    < >  --    HGB  --   --   --   --  10.4*  --  10.4*  --  10.6*  --   --    < >  --    < >  --    MCV  --   --   --   --  90  --  92  --  90  --   --    < >  --    < >  --    PLT  --   --   --   --  199  --  206  --  229  --   --    < >  --    < >  --    NA  --   --   --   --   --   --   --   --  140  --   --   --  141  --  139   POTASSIUM  --   --   --   --   --   --   --   --  3.9  --  4.4  --  4.1  --  3.9   CHLORIDE  --   --   --   --   --   --   --   --  106  --   --   --  109  --  106   CO2  --   --   --   --   --   --   --   --  24  --   --   --  25  --  26   BUN  --   --   --   --   --   --   --   --  13.1  --   --   --  16  --  16   CR  --   --   --   --   --   --   --   --  1.04  --  1.14  --  1.10  1.06  --  1.05  1.06   ANIONGAP  --   --   --   --   --   --   --   --  10  --   --   --  7  --  7   NARA  --   --   --   --   --   --   --   --  8.8  --   --   --  8.7  --  8.9   * 100* 128*   < >  --    < >  --    < > 99   < >  --    < > 107*   < > 90    < > = values in this interval not displayed.     No results found for this or any previous visit (from the past 24 hour(s)).  Medications       acetaminophen  975 mg Oral Q8H     amLODIPine  5 mg Oral BID     apixaban ANTICOAGULANT  5 mg Oral BID     aspirin  81 mg Oral Daily     atorvastatin  40 mg Oral Daily     ceFAZolin  2 g Intravenous Q8H     gabapentin  400 mg Oral TID     insulin aspart   Subcutaneous TID AC     insulin aspart  1-7 Units Subcutaneous TID AC     insulin aspart  1-5 Units Subcutaneous At Bedtime     lisinopril  40 mg Oral Daily     metFORMIN  1,000 mg Oral BID w/meals     pantoprazole  40 mg Oral QAM AC     polyethylene glycol  17 g Oral Daily     primidone  50 mg Oral Q8H [facility]     senna-docusate  1  tablet Oral BID     sodium chloride (PF)  10 mL Intracatheter Q8H     sodium chloride (PF)  10 mL Intracatheter Q8H     sodium chloride (PF)  3 mL Intracatheter Q8H

## 2022-07-31 LAB
BACTERIA BLD CULT: NO GROWTH
BACTERIA BLD CULT: NO GROWTH
BACTERIA BONE ANAEROBE+AEROBE CULT: NORMAL
CRP SERPL-MCNC: 13.73 MG/L
ERYTHROCYTE [SEDIMENTATION RATE] IN BLOOD BY WESTERGREN METHOD: 75 MM/HR (ref 0–20)
GLUCOSE BLDC GLUCOMTR-MCNC: 100 MG/DL (ref 70–99)
GLUCOSE BLDC GLUCOMTR-MCNC: 100 MG/DL (ref 70–99)
GLUCOSE BLDC GLUCOMTR-MCNC: 134 MG/DL (ref 70–99)
GLUCOSE BLDC GLUCOMTR-MCNC: 92 MG/DL (ref 70–99)
GLUCOSE BLDC GLUCOMTR-MCNC: 99 MG/DL (ref 70–99)

## 2022-07-31 PROCEDURE — 120N000001 HC R&B MED SURG/OB

## 2022-07-31 PROCEDURE — 250N000011 HC RX IP 250 OP 636: Performed by: INTERNAL MEDICINE

## 2022-07-31 PROCEDURE — 85652 RBC SED RATE AUTOMATED: CPT | Performed by: PODIATRIST

## 2022-07-31 PROCEDURE — 250N000013 HC RX MED GY IP 250 OP 250 PS 637: Performed by: PHYSICIAN ASSISTANT

## 2022-07-31 PROCEDURE — 250N000013 HC RX MED GY IP 250 OP 250 PS 637: Performed by: PODIATRIST

## 2022-07-31 PROCEDURE — 86140 C-REACTIVE PROTEIN: CPT | Performed by: PODIATRIST

## 2022-07-31 PROCEDURE — 99232 SBSQ HOSP IP/OBS MODERATE 35: CPT | Performed by: PHYSICIAN ASSISTANT

## 2022-07-31 RX ADMIN — AMLODIPINE BESYLATE 5 MG: 5 TABLET ORAL at 21:37

## 2022-07-31 RX ADMIN — PRIMIDONE 50 MG: 50 TABLET ORAL at 21:36

## 2022-07-31 RX ADMIN — APIXABAN 5 MG: 5 TABLET, FILM COATED ORAL at 21:36

## 2022-07-31 RX ADMIN — PANTOPRAZOLE SODIUM 40 MG: 40 TABLET, DELAYED RELEASE ORAL at 07:38

## 2022-07-31 RX ADMIN — SENNOSIDES AND DOCUSATE SODIUM 1 TABLET: 50; 8.6 TABLET ORAL at 07:50

## 2022-07-31 RX ADMIN — PRIMIDONE 50 MG: 50 TABLET ORAL at 13:49

## 2022-07-31 RX ADMIN — GABAPENTIN 400 MG: 400 CAPSULE ORAL at 21:36

## 2022-07-31 RX ADMIN — APIXABAN 5 MG: 5 TABLET, FILM COATED ORAL at 07:38

## 2022-07-31 RX ADMIN — AMLODIPINE BESYLATE 5 MG: 5 TABLET ORAL at 07:40

## 2022-07-31 RX ADMIN — METFORMIN HYDROCHLORIDE 1000 MG: 500 TABLET, EXTENDED RELEASE ORAL at 07:40

## 2022-07-31 RX ADMIN — GABAPENTIN 400 MG: 400 CAPSULE ORAL at 07:39

## 2022-07-31 RX ADMIN — ASPIRIN 81 MG: 81 TABLET, COATED ORAL at 07:39

## 2022-07-31 RX ADMIN — METFORMIN HYDROCHLORIDE 1000 MG: 500 TABLET, EXTENDED RELEASE ORAL at 21:36

## 2022-07-31 RX ADMIN — CEFAZOLIN SODIUM 2 G: 2 INJECTION, SOLUTION INTRAVENOUS at 21:50

## 2022-07-31 RX ADMIN — CEFAZOLIN SODIUM 2 G: 2 INJECTION, SOLUTION INTRAVENOUS at 06:08

## 2022-07-31 RX ADMIN — POLYETHYLENE GLYCOL 3350 17 G: 17 POWDER, FOR SOLUTION ORAL at 07:42

## 2022-07-31 RX ADMIN — LISINOPRIL 40 MG: 40 TABLET ORAL at 07:39

## 2022-07-31 RX ADMIN — CEFAZOLIN SODIUM 2 G: 2 INJECTION, SOLUTION INTRAVENOUS at 13:49

## 2022-07-31 RX ADMIN — PRIMIDONE 50 MG: 50 TABLET ORAL at 06:07

## 2022-07-31 RX ADMIN — ATORVASTATIN CALCIUM 40 MG: 40 TABLET, FILM COATED ORAL at 07:39

## 2022-07-31 RX ADMIN — SENNOSIDES AND DOCUSATE SODIUM 1 TABLET: 50; 8.6 TABLET ORAL at 21:36

## 2022-07-31 RX ADMIN — GABAPENTIN 400 MG: 400 CAPSULE ORAL at 13:49

## 2022-07-31 ASSESSMENT — ACTIVITIES OF DAILY LIVING (ADL)
ADLS_ACUITY_SCORE: 21

## 2022-07-31 NOTE — PLAN OF CARE
"/66 (BP Location: Right arm)   Pulse 55   Temp 98  F (36.7  C) (Oral)   Resp 18   Ht 1.803 m (5' 11\")   Wt 105.7 kg (233 lb)   SpO2 97%   BMI 32.50 kg/m        Vitals stable, HR maritza at times. Independent up to bathroom but SBA for walks in the hallway. R foot dressing CDI, CAM boot on when up. WB heel of R foot. Pt elevating RLE on blue wedge. Up to chair today and pt ambulated in the halls. Denies pain. Midline WDL, scheduled IV antibiotics given. BMx1 this shift. Oral anticoagulation given as scheduled. Pt tolerating regular diet, but reports poor appetite. PO encouraged. BG checked per pt request, no insulin given this shift. Podiatry following. Plan to discharge once bone cultures are final with home infusion. Pt resting comfortably. Will continue to provide supportive cares.                       "

## 2022-07-31 NOTE — PROGRESS NOTES
[facility]    Medicine Progress Note - Hospitalist Service    Date of Admission:  7/22/2022    Assessment & Plan                     Assessment & Plan: Crispin Rojas is a 62 year old male with past medical history significant for type 2 diabetes mellitus, hypertension, hyperlipidemia, CVA, GILA, obesity, PVD, and recent right great toe amputation 06/2022 with persistent nonhealing surgical wound who presented to [facility] on 7/22/2022 with increased right foot pain and radiographic evidence of continued osteomyelitis with associated pathologic fracture and acute abscess.     Right Foot Ulcer with Osteomyelitis  POD #7 I/D right foot with bone biopsy of the right first metatarsal and POD #3 R partial 1st metatarsal resection  Staph Aureus Bacteremia   - pt with a recent right great toe amputation 06/2022 with persistent nonhealing surgical wound who presented to [facility] on 7/22/2022 with increased right foot pain and radiographic evidence of continued osteomyelitis with associated pathologic fracture and acute abscess.  Blood cultures positive on 7/22 for Staph Aureus and Staph Hominis.  Bone bx cx 7/24 positive for Staph Aureus, bone cultures from 7/28 positive for Staph Aureus  - Podiatry and ID following  - POD #3 R partial 1st metatarsal resection  - follow serial blood cultures, negative since 7/24  - discontinued Zosyn and Vancomycin on 7/27/2022.    - started on Ancef on 7/27/22   - midline placed 7/30 and extended IV abx. Podiatry recommends treatment for residual osteomyelitis, 6 weeks of abx from 7/28  - Podiatry wants to wait for bone cultures from 7/28 to result prior to discharge, waiting for sensitivities, anticipate discharge tomorrow   - PT consulted, recommend home with assist.   - SW consulted for discharge planning.  If plan to discharge home, will need outpatient abx set up for 6 weeks.   - pt needs to clarify activity recommendations /  restrictions from Podiatry prior to discharge     Type 2 Diabetes Mellitus  Peripheral Neuropathy   - diabetes diagnosed in 1990.  Hgb A1C 6.6 (6/2022).  BS have been .  Pt with a poor appetite.     - pta Metformin, Ozempic and home sliding scale insulin have been on hold.  - pt has not taken his pta Tresiba for a week pta due to poor appetite and this held on admission due to npo status for surgery.  Even when tolerating po this was not resumed due to BS of 90-100s  Continue to hold.  - pt reports glucose spikes with abx administration, would like correction for this. On review, glucose spikes between 120-140, which is reasonable but this is high according to the patient.    - ok for 2 units following abx admin if glucose is >100. Pt can participate in decision making regarding insulin administration  - resume carb correction with meals, 3 units per 10 carbs. Pt can participate in decision making regarding insulin administration  - appetite is improving, Metformin resumed. Continue holding home Ozempic and Tresiba, hold on discharge as well unless glucose starts to rise. Discussed with pt as well  - continue pta Gabapentin  - continue ISS protocol     Hx RLE DVT - (6/3/2022) RLE Doppler U/S revealed a subocclusive DVT within the right posterior tibial vein.  Likely provoked in the setting of right toe infection at that time.  Discharged on Eliquis with plan for 3 months of therapy. Eliquis held on this admission due to surgery.  RLE Doppler U/S on 7/26/22 revealed a new DVT in one of the paired right gastrocnemius veins and resolution of DVT in the posterior tibial vein.   - started on a heparin gtt on 7/26 due to surgery 7/28  - heparin held for surgery 7/28, resumed afterwards at midnight  - resumed pta Eliquis 7/30, after 24 hours of heparin, stop heparin     Chronic Anemia - hgb 10.6, baseline 11. Hgb 9.9 7/30     HTN - continue pta Amlodipine and Lisinopril.  Holding prn Lasix and hydrochlorothiazide  with poor oral intake. BPs stable, oral intake slowly improving.   - BPs stable within normal ranges, discuss resuming hydrochlorothiazide on day of discharge       GILA - patient does not tolerate cpap.  Symptoms improved after weight loss.  Monitor     GERD - continue pta Omeprazole     Peripheral Vascular Disease - pta ASA held, resumed 7/30.  Continue Atorvastatin     Hx CVA - 2010.  No residual deficits other than difficulty with remembering faces/names. PTA ASA held prior to surgery, resumed 7/30.  Continue Atorvastatin.     Essential Tremor - continue pta Primidone     Obesity, BMI 32    Covid-19 negative 7/22, 7/30       Diet: Regular Diet Adult  Snacks/Supplements Adult: Glucerna; With Meals    DVT Prophylaxis: DOAC  Mccarthy Catheter: Not present  Central Lines: PRESENT     Cardiac Monitoring: None  Code Status: Full Code      Disposition Plan      Expected Discharge Date: 08/01/2022    Discharge Delays: IV Medication - consider oral or Home Infusion  Lab Result Pending (enter specific test & time in comments)  Destination: home with family          The patient's care was discussed with the Bedside Nurse, Patient and Podiatry Consultant.    Alethea Penny PA-C  Hospitalist Service  [facility]  Securely message with the Vocera Web Console (learn more here)  Text page via [employer] Paging/Directory         Clinically Significant Risk Factors Present on Admission                      ______________________________________________________________________    Interval History   No complaints, inquiring about any activity restrictions. No complaints    Data reviewed today: I reviewed all medications, new labs and imaging results over the last 24 hours. I personally reviewed no images or EKG's today.    Physical Exam   Vital Signs: Temp: 97.5  F (36.4  C) Temp src: Oral BP: 119/61 Pulse: 54   Resp: 18 SpO2: 96 % O2 Device: None (Room air)    Weight: 233 lbs 0 oz    GENERAL:   Comfortable.  PSYCH: pleasant, oriented, No acute distress.  HEART:  RRR  LUNGS:  Normal Respiratory effort.   EXTREMITIES:  R foot bandaged. +2 pulses bilateral and equal.  SKIN:  Dry to touch, No rash, wound or ulcerations.  NEUROLOGIC:  Grossly intact     Data   Recent Labs   Lab 07/31/22  0749 07/31/22  0212 07/30/22  2346 07/30/22  1203 07/30/22  1106 07/29/22  0818 07/29/22  0518 07/29/22  0255 07/28/22  2352 07/27/22  1313 07/27/22  0909 07/26/22  0725 07/26/22  0600 07/25/22  0628 07/25/22  0548   WBC  --   --   --   --  4.7  --  5.4  --  6.1  --  4.4  --   --    < >  --    HGB  --   --   --   --  9.9*  --  10.4*  --  10.4*  --  10.6*  --   --    < >  --    MCV  --   --   --   --  90  --  90  --  92  --  90  --   --    < >  --    PLT  --   --   --   --  180  --  199  --  206  --  229  --   --    < >  --    NA  --   --   --   --  139  --   --   --   --   --  140  --   --   --  141   POTASSIUM  --   --   --   --  4.3  --   --   --   --   --  3.9  --  4.4  --  4.1   CHLORIDE  --   --   --   --  106  --   --   --   --   --  106  --   --   --  109   CO2  --   --   --   --  25  --   --   --   --   --  24  --   --   --  25   BUN  --   --   --   --  14.4  --   --   --   --   --  13.1  --   --   --  16   CR  --   --   --   --  0.95  --   --   --   --   --  1.04  --  1.14  --  1.10  1.06   ANIONGAP  --   --   --   --  8  --   --   --   --   --  10  --   --   --  7   NARA  --   --   --   --  8.6*  --   --   --   --   --  8.8  --   --   --  8.7   GLC 92 100* 103*   < > 108*   < >  --    < >  --    < > 99   < >  --    < > 107*    < > = values in this interval not displayed.     No results found for this or any previous visit (from the past 24 hour(s)).  Medications       acetaminophen  975 mg Oral Q8H     amLODIPine  5 mg Oral BID     apixaban ANTICOAGULANT  5 mg Oral BID     aspirin  81 mg Oral Daily     atorvastatin  40 mg Oral Daily     ceFAZolin  2 g Intravenous Q8H     gabapentin  400 mg Oral TID     insulin aspart    Subcutaneous TID AC     insulin aspart  1-7 Units Subcutaneous TID AC     insulin aspart  1-5 Units Subcutaneous At Bedtime     lisinopril  40 mg Oral Daily     metFORMIN  1,000 mg Oral BID w/meals     pantoprazole  40 mg Oral QAM AC     polyethylene glycol  17 g Oral Daily     primidone  50 mg Oral Q8H MELISSA     senna-docusate  1 tablet Oral BID     sodium chloride (PF)  10 mL Intracatheter Q8H     sodium chloride (PF)  10 mL Intracatheter Q8H     sodium chloride (PF)  3 mL Intracatheter Q8H

## 2022-07-31 NOTE — PLAN OF CARE
"INPATIENT NOTE: [id number]     PRIMARY PROBLEM: Acute osteomyelitis of right foot         Vital signs:  Temp: 97.9  F (36.6  C) Temp src: Oral BP: 137/73 Pulse: 55   Resp: 16 SpO2: 94 % O2 Device: None (Room air) Oxygen Delivery: 2 LPM Height: 180.3 cm (5' 11\") Weight: 105.7 kg (233 lb)  Estimated body mass index is 32.5 kg/m  as calculated from the following:    Height as of this encounter: 1.803 m (5' 11\").    Weight as of this encounter: 105.7 kg (233 lb).        Orientation: Alert and Oriented x4  Neuro: Intact   Pain status: denying pain.   Resp: Lung sounds are Clear. Denies any SOB.   Cardiac: WNL, Denies any Chest Pain   GI: Bowels are active x 4 Quads.   Last BM 7/25/22  : Voiding with no concern    Skin: surgical incision on right foot. CMS intact.  Peripheral edema: right lower leg  LDA:Single Lumen midline  Infusions: Patient is Saline locked.   Pertinent Labs: pt is on K+ protocol  Diet: Regular  Activity: are SBA with Gait Belt and Walker  Consults:  Vascular access, Podiatry, Infectious disease  Discharge Plan: waiting for bone cultures from 7/28 to result prior to discharge   Shift Event: pt is on blood sugar checks. Pt surgical dressing is clean,dry and intact. Pt wears cam boot when out of bed.     Will continue to monitor and provide cares.     Koffi Abraham RN    "

## 2022-07-31 NOTE — PLAN OF CARE
"/73 (BP Location: Right arm)   Pulse 55   Temp 97.9  F (36.6  C) (Oral)   Resp 16   Ht 1.803 m (5' 11\")   Wt 105.7 kg (233 lb)   SpO2 94%   BMI 32.50 kg/m      Vitals stable, HR maritza at times. Independent up to bathroom but SBA for walks in the hallway. R foot dressing CDI, CAM boot on when up. WB heel of R foot. Pt elevating RLE on blue wedge. Denies pain. Midline placed, scheduled IV antibiotics given. No BM this shift, senna and miralax given. Oral anticoagulation given as scheduled. Pt tolerating regular diet, but reports poor appetite. PO encouraged. BG checked per pt request, no insulin given this shift. Podiatry following. Plan to discharge once bone cultures are final. Pt resting comfortably. Will continue to provide supportive cares.       "

## 2022-07-31 NOTE — CONSULTS
"Care Management Follow Up    Length of Stay (days): 9    Expected Discharge Date: 08/01/2022     Concerns to be Addressed: discharge planning     Patient plan of care discussed at interdisciplinary rounds: Yes    Anticipated Discharge Disposition: Home, Home Infusion     Anticipated Discharge Services: None  Anticipated Discharge DME: None    Patient/family educated on Medicare website which has current facility and service quality ratings: yes   Patient/Family in Agreement with the Plan: yes    Referrals Placed by CM/SW: Home Infusion  Private pay costs discussed: Not applicable    Additional Information:  Acknowledge consult to assist with resources for hyperbaric oxygen treatment.   Hyperbaric Oxygen Centers are only open M-.  There are private pay options in the [facility].   Medical centers providing hyperbaric oxygen treatment include:    [facility], Fourth Floor ()  [address] [place] [address]  [phone]    [address] [address]  Appointments: [phone]    Per [facility] web site, \"Your doctor will need to make a request to the hyperbaric physician for consultation on your case. Then you may make an appointment for this consultation.\"    Patient will require a referral from the provider (hospitalist, podiatry, or primary care) to the Hyperbaric provider in order to schedule an consultation.     Care management will continue to collaborate with provider for any further necessary assistance for discharge planning.       Kassie Jefferson, RN      Kassie Jefferson RN Case Manager  Inpatient Care Coordination  [facility]   [phone]    "

## 2022-07-31 NOTE — DISCHARGE INSTRUCTIONS
The podiatrist has entered a discharge order for hyperbaric oxygen therapy. This order has been faxed/ sent to the medical center for the hyperbaric physician to review for consultation on your case. Once the center has reviewed, they will call you to schedule a consultation. If you do not hear from them in 7-14 days, please call the center for follow up.     Medical centers providing hyperbaric oxygen treatment include:    [facility], Fourth Floor () [address] [place] [address]  [phone]    [address] [address]  Appointments: [phone]    Your home infusion referral was sent to [facility]  If you haven't heard from them regarding teaching or supply delivery or have questions,  Please call them at [phone]

## 2022-08-01 ENCOUNTER — HOME INFUSION (PRE-WILLOW HOME INFUSION) (OUTPATIENT)
Dept: PHARMACY | Facility: CLINIC | Age: 62
End: 2022-08-01

## 2022-08-01 VITALS
HEART RATE: 58 BPM | TEMPERATURE: 97.7 F | HEIGHT: 71 IN | OXYGEN SATURATION: 97 % | RESPIRATION RATE: 16 BRPM | DIASTOLIC BLOOD PRESSURE: 66 MMHG | BODY MASS INDEX: 32.62 KG/M2 | WEIGHT: 233 LBS | SYSTOLIC BLOOD PRESSURE: 128 MMHG

## 2022-08-01 LAB
BACTERIA BLD CULT: NO GROWTH
BACTERIA BLD CULT: NO GROWTH
BACTERIA BONE ANAEROBE+AEROBE CULT: ABNORMAL
ERYTHROCYTE [DISTWIDTH] IN BLOOD BY AUTOMATED COUNT: 14.3 % (ref 10–15)
GLUCOSE BLDC GLUCOMTR-MCNC: 105 MG/DL (ref 70–99)
GLUCOSE BLDC GLUCOMTR-MCNC: 110 MG/DL (ref 70–99)
GLUCOSE BLDC GLUCOMTR-MCNC: 114 MG/DL (ref 70–99)
GLUCOSE BLDC GLUCOMTR-MCNC: 122 MG/DL (ref 70–99)
HCT VFR BLD AUTO: 32 % (ref 40–53)
HGB BLD-MCNC: 9.7 G/DL (ref 13.3–17.7)
MCH RBC QN AUTO: 27.5 PG (ref 26.5–33)
MCHC RBC AUTO-ENTMCNC: 30.3 G/DL (ref 31.5–36.5)
MCV RBC AUTO: 91 FL (ref 78–100)
PATH REPORT.COMMENTS IMP SPEC: NORMAL
PATH REPORT.COMMENTS IMP SPEC: NORMAL
PATH REPORT.FINAL DX SPEC: NORMAL
PATH REPORT.GROSS SPEC: NORMAL
PATH REPORT.MICROSCOPIC SPEC OTHER STN: NORMAL
PATH REPORT.RELEVANT HX SPEC: NORMAL
PHOTO IMAGE: NORMAL
PLATELET # BLD AUTO: 169 10E3/UL (ref 150–450)
RBC # BLD AUTO: 3.53 10E6/UL (ref 4.4–5.9)
WBC # BLD AUTO: 4.1 10E3/UL (ref 4–11)

## 2022-08-01 PROCEDURE — 250N000011 HC RX IP 250 OP 636: Performed by: INTERNAL MEDICINE

## 2022-08-01 PROCEDURE — 99232 SBSQ HOSP IP/OBS MODERATE 35: CPT | Performed by: PHYSICIAN ASSISTANT

## 2022-08-01 PROCEDURE — 250N000013 HC RX MED GY IP 250 OP 250 PS 637: Performed by: PODIATRIST

## 2022-08-01 PROCEDURE — 99232 SBSQ HOSP IP/OBS MODERATE 35: CPT | Performed by: INTERNAL MEDICINE

## 2022-08-01 PROCEDURE — 85014 HEMATOCRIT: CPT | Performed by: PHYSICIAN ASSISTANT

## 2022-08-01 PROCEDURE — 250N000013 HC RX MED GY IP 250 OP 250 PS 637: Performed by: PHYSICIAN ASSISTANT

## 2022-08-01 RX ORDER — ACETAMINOPHEN 325 MG/1
650 TABLET ORAL EVERY 6 HOURS PRN
Status: ON HOLD | COMMUNITY
Start: 2022-08-01 | End: 2023-01-19

## 2022-08-01 RX ORDER — CEFAZOLIN SODIUM 1 G/3ML
2 INJECTION, POWDER, FOR SOLUTION INTRAMUSCULAR; INTRAVENOUS EVERY 8 HOURS
Qty: 1 EACH | Refills: 1 | Status: SHIPPED | OUTPATIENT
Start: 2022-08-01 | End: 2022-08-01

## 2022-08-01 RX ORDER — INSULIN DEGLUDEC 100 U/ML
INJECTION, SOLUTION SUBCUTANEOUS
Qty: 60 ML | Refills: 1 | Status: SHIPPED | OUTPATIENT
Start: 2022-08-01 | End: 2022-12-20

## 2022-08-01 RX ADMIN — METFORMIN HYDROCHLORIDE 1000 MG: 500 TABLET, EXTENDED RELEASE ORAL at 07:43

## 2022-08-01 RX ADMIN — ATORVASTATIN CALCIUM 40 MG: 40 TABLET, FILM COATED ORAL at 07:43

## 2022-08-01 RX ADMIN — GABAPENTIN 400 MG: 400 CAPSULE ORAL at 13:17

## 2022-08-01 RX ADMIN — CEFAZOLIN SODIUM 2 G: 2 INJECTION, SOLUTION INTRAVENOUS at 06:12

## 2022-08-01 RX ADMIN — ASPIRIN 81 MG: 81 TABLET, COATED ORAL at 07:43

## 2022-08-01 RX ADMIN — CEFAZOLIN SODIUM 2 G: 2 INJECTION, SOLUTION INTRAVENOUS at 13:20

## 2022-08-01 RX ADMIN — LISINOPRIL 40 MG: 40 TABLET ORAL at 07:43

## 2022-08-01 RX ADMIN — GABAPENTIN 400 MG: 400 CAPSULE ORAL at 07:42

## 2022-08-01 RX ADMIN — PANTOPRAZOLE SODIUM 40 MG: 40 TABLET, DELAYED RELEASE ORAL at 07:43

## 2022-08-01 RX ADMIN — PRIMIDONE 50 MG: 50 TABLET ORAL at 06:13

## 2022-08-01 RX ADMIN — APIXABAN 5 MG: 5 TABLET, FILM COATED ORAL at 07:42

## 2022-08-01 RX ADMIN — AMLODIPINE BESYLATE 5 MG: 5 TABLET ORAL at 07:43

## 2022-08-01 RX ADMIN — PRIMIDONE 50 MG: 50 TABLET ORAL at 13:17

## 2022-08-01 RX ADMIN — SENNOSIDES AND DOCUSATE SODIUM 1 TABLET: 50; 8.6 TABLET ORAL at 07:43

## 2022-08-01 ASSESSMENT — ACTIVITIES OF DAILY LIVING (ADL)
ADLS_ACUITY_SCORE: 21

## 2022-08-01 NOTE — PROGRESS NOTES
[place] [facility]  PROGRESS NOTE       ASSESSMENT:  1. S/p right foot partial first ray resection on 7/28/22, with residual osteomyelitis to first metatarsal.  2. Bacteremia --> Staph aureus on 7/22     PLAN:   -Patient seen at bedside. States doing well and less pain compared to preop.   -Dressing changed, incision site healthy.   -Await bone cultures to finalize discharge abx plan. Now MSSA   -WB to heel of right foot. CAM boot is comfortable   -Referral also placed for [facility] for Hyperbaric oxygen therapy to assist in further wound healing.   -Plan for patient to follow up with myself on 8/10. Discussed every other day dressing changes until then. He understands and agrees. Plan to keep dressing clean and dry. Discharge info filled out. All questions answered.     Tiny Soto DPM  [facility] Department of Podiatry/Foot & Ankle Surgery  [phone]      _______________________________________________________________________________________  CHIEF COMPLAINT:      I was asked by Albaro Sandoval DO to evaluate this patient, Crispin Rojas, for suspected right foot abscess and osteomyelitis.    PATIENT HISTORY:  Crispin Rojas is a 62-year-old male seen in follow up for his right foot. States doing well and minimal pain with WB. Denies N/F/V/C/D. Has been walking in the la[place].     Review of Systems:  A 10 point review of systems was performed and is positive for that noted above in the patient history.  All other areas are negative.     PAST MEDICAL HISTORY:   Past Medical History:   Diagnosis Date     Cerebral infarction (H)     2010     Chronic infection      H/O blood clots 2022     Hyperlipidemia LDL goal <70      Hypertension      Numbness and tingling      Obesity      GILA (obstructive sleep apnea) 10/22/2018    CPAP intolerant     PVD (peripheral vascular disease) (H)     s/p left partial toe amputation     Renal stone      Tremor      Type 2 diabetes  mellitus with diabetic polyneuropathy, with long-term current use of insulin (H)         PAST SURGICAL HISTORY:   Past Surgical History:   Procedure Laterality Date     AMPUTATE FOOT Left 8/18/2016    Procedure: AMPUTATE FOOT;  Surgeon: Jack Younger DPM;  Location: [facility] OR     AMPUTATE TOE(S) Right 2/1/2016    Procedure: AMPUTATE TOE(S);  Surgeon: Rachelle Manriquez DPM, Pod;  Location: [facility] OR     AMPUTATE TOE(S) Right 8/2/2019    Procedure: Right fourth toe partial amputation for treatment of osteomyelitis.;  Surgeon: Jack Younger DPM;  Location: [facility] OR     AMPUTATE TOE(S) Left 11/8/2019    Procedure: Left second toe amputation at the metatarsophalangeal joint.;  Surgeon: Rachelle Manriquez DPM, Podiatry/Foot and Ankle Surgery;  Location: [facility] OR     AMPUTATE TOE(S) Right 6/5/2022    Procedure: AMPUTATION OF RIGHT GREAT TOE;  Surgeon: Wili Quiles DPM;  Location: [facility] OR     AMPUTATE TOE(S) Right 7/28/2022    Procedure: Right foot partial first metatarsal resection;  Surgeon: Tiny Soto DPM;  Location: [facility] OR     ANGIOGRAM       BIOPSY BONE FOOT Right 7/24/2022    Procedure: Bone biopsy, right first metatarsal.;  Surgeon: Valentino Molina DPM;  Location: [facility] OR     COLONOSCOPY       COMBINED CYSTOSCOPY, RETROGRADES, URETEROSCOPY, INSERT STENT Left 8/21/2016    Procedure: COMBINED CYSTOSCOPY, RETROGRADES, URETEROSCOPY, INSERT STENT;  Surgeon: Artemio Valenzuela MD;  Location: [facility] OR     COMBINED CYSTOSCOPY, RETROGRADES, URETEROSCOPY, LASER HOLMIUM LITHOTRIPSY URETER(S), INSERT STENT Left 10/3/2016    Procedure: COMBINED CYSTOSCOPY, RETROGRADES, URETEROSCOPY, LASER HOLMIUM LITHOTRIPSY URETER(S), INSERT STENT;  Surgeon: Artemio Valenzuela MD;  Location: [facility] OR     EXTRACORPOREAL SHOCK WAVE LITHOTRIPSY (ESWL) Left 9/8/2016    Procedure: EXTRACORPOREAL SHOCK WAVE LITHOTRIPSY (ESWL);  Surgeon: Artemio Valenzuela MD;  Location: [facility] OR     INCISION AND DRAINAGE FOOT, COMBINED Right 7/24/2022     Procedure: Incision and drainage, right foot ;  Surgeon: Valentino Molina DPM;  Location: [facility] OR     RECESSION GASTROCNEMIUS Right 2/1/2016    Procedure: RECESSION GASTROCNEMIUS;  Surgeon: Rachelle Manriquez DPM, Pod;  Location: [facility] OR        MEDICATIONS:  Reviewed in Epic.     ALLERGIES:    Allergies   Allergen Reactions     Bactrim [Sulfamethoxazole W/Trimethoprim] Nausea and Vomiting     Sulfa Drugs Nausea     Niacin Swelling     SIMCOR caused edema in extremities     Simvastatin Swelling     SIMCOR caused edema in extremities        SOCIAL HISTORY:   Social History     Socioeconomic History     Marital status:      Spouse name: Sydney     Number of children: 2     Years of education: Not on file     Highest education level: Master's degree (e.g., MA, MS, [place], MEd, MSW, ELIANA)   Occupational History     Occupation: marketing     Employer: [employer]     Comment: [employer]   Tobacco Use     Smoking status: Never Smoker     Smokeless tobacco: Never Used   Vaping Use     Vaping Use: Never used   Substance and Sexual Activity     Alcohol use: Yes     Alcohol/week: 0.0 standard drinks     Comment: rare---red wine 3x per month     Drug use: No     Sexual activity: Not Currently     Partners: Female   Other Topics Concern     Parent/sibling w/ CABG, MI or angioplasty before 65F 55M? No   Social History Narrative     Not on file     Social Determinants of Health     Financial Resource Strain: Low Risk      Difficulty of Paying Living Expenses: Not very hard   Food Insecurity: No Food Insecurity     Worried About Running Out of Food in the Last Year: Never true     Ran Out of Food in the Last Year: Never true   Transportation Needs: No Transportation Needs     Lack of Transportation (Medical): No     Lack of Transportation (Non-Medical): No   Physical Activity: Sufficiently Active     Days of Exercise per Week: 4 days     Minutes of Exercise per Session: 40 min   Stress: No Stress Concern Present     Feeling of  "Stress : Not at all   Social Connections: Moderately Isolated     Frequency of Communication with Friends and Family: Once a week     Frequency of Social Gatherings with Friends and Family: Never     Attends Voodoo Services: More than 4 times per year     Active Member of Clubs or Organizations: No     Attends Club or Organization Meetings: Not on file     Marital Status:    Intimate Partner Violence: Not on file   Housing Stability: Low Risk      Unable to Pay for Housing in the Last Year: No     Number of Places Lived in the Last Year: 2     Unstable Housing in the Last Year: No        FAMILY HISTORY:   Family History   Problem Relation Age of Onset     Arthritis Mother         SLE     Connective Tissue Disorder Mother         lupus     Diabetes Mother      Cerebrovascular Disease Mother      Cancer Mother      Diabetes Father      Diabetes Maternal Grandfather      Diabetes Sister         EXAM:  Vitals: BP (!) 141/68 (BP Location: Right arm)   Pulse 53   Temp 97.5  F (36.4  C) (Oral)   Resp 18   Ht 1.803 m (5' 11\")   Wt 105.7 kg (233 lb)   SpO2 93%   BMI 32.50 kg/m    BMI= Body mass index is 32.5 kg/m .    LABS:   Lab Results   Component Value Date    A1C 6.6 06/03/2022    A1C 6.1 01/03/2022    A1C 6.2 09/14/2021    A1C 6.5 06/22/2021    A1C 8.3 01/08/2021    A1C 6.0 09/09/2020    A1C 7.8 05/12/2020    A1C 8.8 02/04/2020       Lab Results   Component Value Date    INR 1.08 11/07/2019    INR 1.04 08/19/2013       Lab Results   Component Value Date    WBC 5.5 07/23/2022    WBC 6.5 05/10/2021     Lab Results   Component Value Date    HGB 10.3 07/23/2022    HGB 12.1 05/10/2021     Lab Results   Component Value Date     07/23/2022     05/10/2021       All cultures:  Recent Labs   Lab 07/28/22  1410 07/27/22  0918 07/27/22  0915 07/26/22  1341 07/26/22  1339   CULTURE 1+ Staphylococcus aureus*  No anaerobic organisms isolated after 3 days No growth after 4 days No growth after 4 days No " Growth No Growth     General appearance:    Crispin is alert and fully cooperative with history & exam.  No sign of distress is noted during the visit.      Psychiatric: Affect is pleasant & appropriate.  Crispin is motivated to improve health.       Respiratory: Breathing is regular & unlabored while sitting.      HEENT: Hearing is intact to spoken word.  Speech is clear.  No gross evidence of visual impairment that would impact ambulation.      Dermatologic: Dressing changed on right foot. Some noted sanguinous drainage, well controlled. No cellulitis or dehiscence. Healthy periwound. Nontender.   Left foot: submet one and submet 2 calluses, no open lesions or ulcerations to site.      Vascular: Dorsalis pedis and posterior tibial pulses are strongly palpable, right foot.      Neurologic: Lower extremity sensation is diminished, bilateral foot, to light touch.  No evidence of neurological-based weakness or contracture in the lower extremities.       Musculoskeletal: Patient is ambulatory without an assistive device or brace.  Digital contractures.  The right hallux has been amputated.  The left hallux and second toes are amputated.    IMAGING:   Right foot xrays: Partial resection of first metatarsal. Post op air. No residual osseous fragments.     Culture:   Foot, Right; Bone Biopsy          0 Result Notes    Culture 1+ Staphylococcus aureus Abnormal            Pathology:   Final Diagnosis   Bone, right first metatarsal, biopsy:  - Consistent with chronic osteomyelitis.  - Negative for malignancy.  - Microbiology studies from this procedure will be reported separately by the performing laboratory; correlation with those results is recommended.        Culture 7/28/22  Foot, Right; Bone Resection          0 Result Notes    Gram Stain Result   Abnormal   1+ Gram negative bacilli      1+ WBC seen

## 2022-08-01 NOTE — PROGRESS NOTES
Care Management Discharge Note    Discharge Date: 08/01/2022       Discharge Disposition: Home, Home Infusion    Discharge Services: None    Discharge DME: None    Discharge Transportation: family or friend will provide    Private pay costs discussed: Not applicable    Patient/family educated on Medicare website which has current facility and service quality ratings: yes    Patient/Family in Agreement with the Plan: yes    Handoff Referral Completed: Yes    Additional Information:  Care management following for discharge planning.  ID is planning for IV ABX upon discharge and has entered orders for discharge. Patient has a midline. Ceftriaxone every 8 hours currently dosed at [id number].   [facility] is following and will be in contact with patient for arrangements and teaching.    Per rounds, no further home care or therapy needs upon discharge.     Podiatry has entered discharge order for hyperbaric oxygen.    [facility], Fourth Floor ()  [address] [place], [place] [id number] [phone]  Fax [phone]     [address] [place] [address]  Appointments: [phone]  Fax [phone]    Order for hyperbaric oxygen treatment, face sheet, and podiatry notes faxed to [facility]. They will review documentation and follow up with patient for scheduling. Of note, [facility] is not accepting new patients at this time and is several weeks out for scheduling.     Update 1250: Home infusion will need clarification on anticipated dosing length reflected and updated in discharge orders.     Update 1510: Noted updated discharge antibiotic order clarifying dosing for 21 days by Dr. Sharma. Paged Dr. Sharma, [phone] requesting to remove duplicate IV ABX order.       Kassie Jefferson, RN      Kassie Jefferson RN Case Manager  Inpatient Care Coordination  [facility]   [phone]

## 2022-08-01 NOTE — PLAN OF CARE
Inpatient [id number] Progress note.     Pt alert and oriented. Denies pain & SOB. Pt has bowel movent previous shift and denies abdominal discomfort. Pt walked on the hallway X 1. Pt took a shower with staff SBA. R foot dressing intact. CAM boots when up and  ambulate. Pt use blue wedge while resting in bed. Midline WDL, schedule IV ABX given. Pt has been drinking a lot of fluids, void well. BS , Pt requested 2 unit insuline.  Podiatry following. Discharge once bone culture finalized.

## 2022-08-01 NOTE — PROGRESS NOTES
Home Infusion  Pat will be discharging today and going home on IV cefazolin q8.  I met with Pat at bedside and instructed in IV administration via SL midline with SAS flushing.   Had Pat perform hands on with practice equipment and teaching sheets. Provided information about supplies and supply delivery, storage of medication, checking of label, dosing times, plan for SNV and 24/7 availability of hospitals staff. [facility] will follow for home RN.   Pat verbalized understanding of information given. He demonstrated very good technique with practice and verbalized good understanding of process.  Stated he feels comfortable with administering abx later tonight.   Dosing schedule: Pat will dose 0600, 1400, 2200 at home.   Delivery: Medication and supplies will be delivered to pt's home today prior to first home dose.   Pat is ready for discharge from hospitals perspective.    Lanette Michaud RN  [facility] Liaison  [phone] (M-F 8a-5p)  [phone] Office

## 2022-08-01 NOTE — PROGRESS NOTES
[facility]  Infectious Disease Progress Note          Assessment and Plan:   IMPRESSION:    1.  A 62-year-old male, well known to me, readmitted with again worsening right great toe infection previously 2 amputations, felt fully resected, but now apparent recurrent infection and at surgery, evidence of soft bone.  Biopsy and cultures are pending.  Previous cultures had mixed josé, prominently Staphylococcus aureus.  3.  Current admission minimal systemic sepsis, but some prominent fatigue over the last several weeks.  One of blood cultures at admission growing sensitive Staphylococcus aureus, presumably coming from the foot.  No evidence of any secondary involved sites, no artificial material, etc.  4.  Prior history of recurrent diabetic foot infection and osteomyelitis that was felt fully resected when last seen in early June.  5.  Prior deep venous thrombosis recently.  6.  Diabetes mellitus.  7.  SULFA ALLERGY, BUT WAS PRIMARILY GI INTOLERANCE.     RECOMMENDATIONS:    1.   MSSA only on ancef  2.  Serial blood cultures to make sure bacteremia clears. Follow-up neg  3.  We will need extended IV antibiotics, regardless of the foot, status simply because of the bacteremia. BC clear Ok midline  4.  Follow closely for any secondary sites. None apparent  5. +biopsy and culture MSSA only, in particular further evidence of osteo, which clinically sounds like the case from the operative findings. Orders in for 3 more weeks Iv, possibly longer vs extended po, Follow-up me, orders in             Interval History:   no new complaints and doing well; no cp, sob, n/v/d, or abd pain. T down Follow-up cx neg so far, foot staph only so far path + op noted concern residual              Medications:       amLODIPine  5 mg Oral BID     apixaban ANTICOAGULANT  5 mg Oral BID     aspirin  81 mg Oral Daily     atorvastatin  40 mg Oral Daily     ceFAZolin  2 g Intravenous Q8H     gabapentin  400 mg Oral TID      "insulin aspart   Subcutaneous TID AC     insulin aspart  1-7 Units Subcutaneous TID AC     insulin aspart  1-5 Units Subcutaneous At Bedtime     lisinopril  40 mg Oral Daily     metFORMIN  1,000 mg Oral BID w/meals     pantoprazole  40 mg Oral QAM AC     polyethylene glycol  17 g Oral Daily     primidone  50 mg Oral Q8H MELISSA     senna-docusate  1 tablet Oral BID     sodium chloride (PF)  10 mL Intracatheter Q8H     sodium chloride (PF)  10 mL Intracatheter Q8H     sodium chloride (PF)  3 mL Intracatheter Q8H                  Physical Exam:   Blood pressure 125/58, pulse 58, temperature 97.9  F (36.6  C), temperature source Oral, resp. rate 16, height 1.803 m (5' 11\"), weight 105.7 kg (233 lb), SpO2 96 %.  Wt Readings from Last 2 Encounters:   07/22/22 105.7 kg (233 lb)   07/20/22 105.9 kg (233 lb 8 oz)     Vital Signs with Ranges  Temp:  [97.5  F (36.4  C)-98  F (36.7  C)] 97.9  F (36.6  C)  Pulse:  [53-70] 58  Resp:  [16-20] 16  BP: (100-141)/(53-68) 125/58  SpO2:  [92 %-97 %] 96 %    Constitutional: Awake, alert, cooperative, no apparent distress   Lungs: Clear to auscultation bilaterally, no crackles or wheezing   Cardiovascular: Regular rate and rhythm, normal S1 and S2, and no murmur noted   Abdomen: Normal bowel sounds, soft, non-distended, non-tender   Skin: No rashes, no cyanosis, no edema foot wrapped   Other:           Data:   All microbiology laboratory data reviewed.  Recent Labs   Lab Test 08/01/22  0601 07/30/22  1106 07/29/22  0518   WBC 4.1 4.7 5.4   HGB 9.7* 9.9* 10.4*   HCT 32.0* 32.3* 34.3*   MCV 91 90 90    180 199     Recent Labs   Lab Test 07/30/22  1106 07/27/22  0909 07/26/22  0600   CR 0.95 1.04 1.14     Recent Labs   Lab Test 07/31/22  1052   SED 75*     Recent Labs   Lab Test 04/20/21  0925 04/20/21  0825 11/07/19  1829 11/07/19  1816 10/31/19  0229 10/31/19  0213 08/02/19  1714 06/24/19 2002 06/24/19  1956   CULT No growth No growth No growth No growth No growth No growth No " anaerobes isolated  Light growth  Streptococcus dysgalactiae serogroup C/G  *  Light growth  Staphylococcus aureus  * No growth No growth

## 2022-08-01 NOTE — PROGRESS NOTES
[facility]    Received update from pt's care coordinator, Kassie that pt may discharge to home today on cefazolin q8. I spoke with Pat and we coordinated bedside teach for today at 1500h.     Thank you for the referral.    Lanette Michaud RN  [facility] Liaison  [phone] (Mon thru Fri 8am - 5pm)  [phone] Office

## 2022-08-01 NOTE — PLAN OF CARE
Shift [id number]  Primary problem: Right foot osteomyelitis     Patient alert and oriented x4. Up independently. Midline in place, seen in left upper arm. Patient denies pain. Right leg elevated on a blue wedge and right foot covered in dressing. Patient reports chronic bilateral neuropathy in lower extremities. Brown discoloration noted on bilateral calves, patient denies symptoms. Patient reports concerns of needing more questions answered. Patient will be discharging home sometime this afternoon/evening.

## 2022-08-01 NOTE — PLAN OF CARE
Patient's After Visit Summary was reviewed with patient    Patient verbalized understanding of After Visit Summary, recommended follow up and was given an opportunity to ask questions.     Discharge medications sent home with patient/family: No    Discharged with family (have yet to be picked up)

## 2022-08-02 ENCOUNTER — PATIENT OUTREACH (OUTPATIENT)
Dept: CARE COORDINATION | Facility: CLINIC | Age: 62
End: 2022-08-02

## 2022-08-02 ENCOUNTER — OFFICE VISIT (OUTPATIENT)
Dept: FAMILY MEDICINE | Facility: CLINIC | Age: 62
End: 2022-08-02
Payer: COMMERCIAL

## 2022-08-02 ENCOUNTER — HOME INFUSION (PRE-WILLOW HOME INFUSION) (OUTPATIENT)
Dept: PHARMACY | Facility: CLINIC | Age: 62
End: 2022-08-02

## 2022-08-02 VITALS
RESPIRATION RATE: 14 BRPM | TEMPERATURE: 97.5 F | HEART RATE: 54 BPM | DIASTOLIC BLOOD PRESSURE: 58 MMHG | WEIGHT: 224 LBS | OXYGEN SATURATION: 100 % | SYSTOLIC BLOOD PRESSURE: 110 MMHG | BODY MASS INDEX: 31.24 KG/M2

## 2022-08-02 DIAGNOSIS — Z79.4 TYPE 2 DIABETES MELLITUS WITH DIABETIC PERIPHERAL ANGIOPATHY AND GANGRENE, WITH LONG-TERM CURRENT USE OF INSULIN (H): ICD-10-CM

## 2022-08-02 DIAGNOSIS — M86.9 OSTEOMYELITIS OF GREAT TOE OF RIGHT FOOT (H): ICD-10-CM

## 2022-08-02 DIAGNOSIS — I82.441 ACUTE DEEP VEIN THROMBOSIS (DVT) OF TIBIAL VEIN OF RIGHT LOWER EXTREMITY (H): ICD-10-CM

## 2022-08-02 DIAGNOSIS — E11.52 TYPE 2 DIABETES MELLITUS WITH DIABETIC PERIPHERAL ANGIOPATHY AND GANGRENE, WITH LONG-TERM CURRENT USE OF INSULIN (H): ICD-10-CM

## 2022-08-02 DIAGNOSIS — Z23 ENCOUNTER FOR VACCINATION: Primary | ICD-10-CM

## 2022-08-02 PROCEDURE — 99496 TRANSJ CARE MGMT HIGH F2F 7D: CPT | Performed by: FAMILY MEDICINE

## 2022-08-02 RX ORDER — APIXABAN 5 MG/1
5 TABLET, FILM COATED ORAL 2 TIMES DAILY
Qty: 60 TABLET | Refills: 1 | Status: SHIPPED | OUTPATIENT
Start: 2022-08-02 | End: 2022-11-11

## 2022-08-02 NOTE — PROGRESS NOTES
"  Assessment & Plan     Acute deep vein thrombosis (DVT) of tibial vein of right lower extremity (H)    - ELIQUIS ANTICOAGULANT 5 MG tablet  Dispense: 60 tablet; Refill: 1    Encounter for vaccination      Osteomyelitis of great toe of right foot (H)      Type 2 diabetes mellitus with diabetic peripheral angiopathy and gangrene, with long-term current use of insulin (H)                 BMI:   Estimated body mass index is 31.24 kg/m  as calculated from the following:    Height as of 7/22/22: 1.803 m (5' 11\").    Weight as of this encounter: 101.6 kg (224 lb).           Return in about 5 months (around 1/9/2023) for Follow up, with me.    Johann Serna DO  [facility]    Tatyana Dimas is a 62 year old, presenting for the following health issues:  Hospital F/U (Would like to go over CBC  results from the hospital. )      HPI     Post Discharge Outreach 8/2/2022   Admission Date 7/22/2022   Reason for Admission Foot pain   Discharge Date 8/1/2022   Discharge Diagnosis Acute osteomyelitis of right foot   How are you doing now that you are home? Patient states he is doing okay at home. At the time of my call, patient stated his home infusion nurse was going to be there in a few minutes. Patient states he has been getting around his home okay. Patient states his biggest concern is the fact that he doesn't have an appetite. This started prior to his hospitalization. Patient states he makes himself eat and that he has lost about 30 lbs. Patient also expressed concern regarding transportation for getting to his upcoming appiontments. RN CC discussed CC services with patient, and he is interested in that, however, we did not have time to complete the assessment and establish a goal at the time of our call. RN CC will ask CHW to call out to patient to schedule with RN CC for assessment.   How are your symptoms? (Red Flag symptoms escalate to triage hotline per guidelines) Improved   Do you feel your " condition is stable enough to be safe at home until your provider visit? Yes   Does the patient have their discharge instructions?  Yes   Does the patient have questions regarding their discharge instructions?  No   Were you started on any new medications or were there changes to any of your previous medications?  No   Does the patient have all of their medications? Yes   Do you have questions regarding any of your medications?  No   Do you have all of your needed medical supplies or equipment (DME)?  (i.e. oxygen tank, CPAP, cane, etc.) Yes   Discharge follow-up appointment scheduled within 14 calendar days?  Yes   Discharge Follow Up Appointment Date 8/2/2022   Discharge Follow Up Appointment Scheduled with? Primary Care Provider     Hospital Follow-up Visit:    Hospital/Nursing Home/IP Rehab Facility: [facility]  Date of Admission: 07/22/2022  Date of Discharge: 08/01/2022  Reason(s) for Admission: osteomyelitis right foot    Was your hospitalization related to COVID-19? No   Problems taking medications regularly:  None  Medication changes since discharge: patient not currently taking Ozempic due to normal blood sugars.   Problems adhering to non-medication therapy:  None    Summary of hospitalization:  Most recent progress notes reviewed.  Diagnostic Tests/Treatments reviewed.  Follow up needed: With Podiatry, Wound Care, and Infectious Disease  Other Healthcare Providers Involved in Patient s Care:         See above.   Update since discharge: stable.       Post Medication Reconciliation Status:        Plan of care communicated with patient               Review of Systems   Patient is seen for follow-up evaluation after recent management for osteomyelitis and right foot, and DVT in right lower extremity.    Patient is treating osteomyelitis with hyperbaric chamber and antibiotic therapy.     Main concerns at exam are fatigue since this past June, and poor appetite.     Glucose has been   while taking metformin. Has not been using Ozempic with these recent glucose numbers which I felt was appropriate.  He would like to keep the medication on his medication list for now.    We reviewed his most recent labs.  He had a TSH level done on 07/23/2022 which was normal, and his hemoglobin is near his ongoing baseline.  No clear cause of his fatigue per labs.    Patient was agreeable to getting his recommended COVID-19 booster.      Objective    /58   Pulse 54   Temp 97.5  F (36.4  C) (Oral)   Resp 14   Wt 101.6 kg (224 lb)   SpO2 100%   BMI 31.24 kg/m    Body mass index is 31.24 kg/m .   JLW    Physical Exam   Vital signs reviewed.  Patient is in no acute appearing distress.  Breathing appears nonlabored.  Patient is alert and oriented ×3.  Patient is very pleasant, making good eye contact and responding with clear fluent speech.    Heart: Heart rate is regular without murmur.    Lungs: Lungs are clear to auscultation with good airflow bilaterally.    Skin/extremities: Overall warm and dry, with 1 to 1.5 mm pitting lower leg edema, slightly more in right lower extremity.  The dressing was left in place on right foot, with the dressing being dry.                    .  ..

## 2022-08-02 NOTE — PROGRESS NOTES
Clinic Care Coordination Contact  [facility]: Post-Discharge Note  SITUATION                                                      Admission:    Admission Date: 07/22/22   Reason for Admission: Foot pain  Discharge:   Discharge Date: 08/01/22  Discharge Diagnosis: Acute osteomyelitis of right foot    BACKGROUND                                                      Per hospital discharge summary and inpatient provider notes:    Discharge summary pending    ASSESSMENT      Enrollment  Primary Care Care Coordination Status: Potential    Discharge Assessment  How are you doing now that you are home?: Patient states he is doing okay at home. At the time of my call, patient stated his home infusion nurse was going to be there in a few minutes. Patient states he has been getting around his home okay. Patient states his biggest concern is the fact that he doesn't have an appetite. This started prior to his hospitalization. Patient states he makes himself eat and that he has lost about 30 lbs. Patient also expressed concern regarding transportation for getting to his upcoming appiontments. RN CC discussed CC services with patient, and he is interested in that, however, we did not have time to complete the assessment and establish a goal at the time of our call. RN CC will ask CHW to call out to patient to schedule with RN CC for assessment.  How are your symptoms? (Red Flag symptoms escalate to triage hotline per guidelines): Improved  Do you feel your condition is stable enough to be safe at home until your provider visit?: Yes  Does the patient have their discharge instructions? : Yes  Does the patient have questions regarding their discharge instructions? : No  Were you started on any new medications or were there changes to any of your previous medications? : No  Does the patient have all of their medications?: Yes  Do you have questions regarding any of your medications? : No  Do you have all of your needed medical  supplies or equipment (DME)?  (i.e. oxygen tank, CPAP, cane, etc.): Yes  Discharge follow-up appointment scheduled within 14 calendar days? : Yes  Discharge Follow Up Appointment Date: 08/02/22  Discharge Follow Up Appointment Scheduled with?: Primary Care Provider              Care Management       Care Mgmt General Assessment  Referral  Referral Source: IP Handoff        Resources  Community Resources: Home Infusion             PLAN                                                      Outpatient Plan:  Patient to continue home infusion services with [facility]. Patient to attend follow up appointment this afternoon. Patient to attend follow up appointments as scheduled. RN CC to request CHW to contact patient to schedule with RN CC for assessment and establish goal(s).    Future Appointments   Date Time Provider Department Center   8/2/2022  4:00 PM Johann Serna DO [facility]   8/10/2022  3:00 PM Tiny Soto, DPEDDIE [facility]   8/15/2022  4:30 PM Shannan Bryant MD [facility]   1/9/2023  7:30 AM Johann Serna DO [facility]   1/26/2023  7:30 AM Shira Montanez, HO GAMEZ [facility]         For any urgent concerns, please contact our 24 hour nurse triage line: [phone] ([phone])         Megan Matos RN Care Coordinator  [facility]  (Covering for Lead RN Care Coordinator)

## 2022-08-02 NOTE — PROGRESS NOTES
This is a recent snapshot of the patient's [facility] medical record.  For current drug dose and complete information and questions, call [phone] or In Basket pool, fv home infusion ([id number])  CSN Number:  [id number]

## 2022-08-03 ENCOUNTER — PATIENT OUTREACH (OUTPATIENT)
Dept: CARE COORDINATION | Facility: CLINIC | Age: 62
End: 2022-08-03

## 2022-08-03 NOTE — PROGRESS NOTES
This is a recent snapshot of the patient's [facility] medical record.  For current drug dose and complete information and questions, call [phone] or In Basket pool, fv home infusion ([id number])  CSN Number:  [id number]

## 2022-08-03 NOTE — LETTER
[facility] CARE COORDINATION  [facility]  August 5, 2022    Crispin Rojas  [address] [place] [address]      Dear Crispin,    I am a clinic community health worker who works with Johann Serna DO with the [facility]. I wanted to introduce myself and provide you with my contact information for you to be able to call me with any questions or concerns. Below is a description of clinic care coordination and how I can further assist you.       The clinic care coordination team is made up of a registered nurse, , financial resource worker and community health worker who understand the health care system. The goal of clinic care coordination is to help you manage your health and improve access to the health care system. Our team works alongside your provider to assist you in determining your health and social needs. We can help you obtain health care and community resources, providing you with necessary information and education. We can work with you through any barriers and develop a care plan that helps coordinate and strengthen the communication between you and your care team.    Please feel free to contact me with any questions or concerns regarding care coordination and what we can offer.      We are focused on providing you with the highest-quality healthcare experience possible.      Sincerely,       RILEY Townsend. Public Health  Community Health Worker  [facility]  Clinic Care Coordination  [phone]

## 2022-08-03 NOTE — PROGRESS NOTES
Clinic Care Coordination Contact  [facility]       Clinical Data: Care Coordinator Outreach  Outreach attempted x 1.  Left message on patient's voicemail with call back information and requested return call.    Delegation: Writer to reschedule initial CC assessment.     Plan: Care Coordinator will try to reach patient again in 1-2 business days.    RILEY Townsend. Public Health  Community Health Worker  [place], [place] [facility]  Clinic Care Coordination  [phone]

## 2022-08-04 ENCOUNTER — LAB REQUISITION (OUTPATIENT)
Dept: LAB | Facility: CLINIC | Age: 62
End: 2022-08-04
Payer: COMMERCIAL

## 2022-08-04 ENCOUNTER — HOME INFUSION (PRE-WILLOW HOME INFUSION) (OUTPATIENT)
Dept: PHARMACY | Facility: CLINIC | Age: 62
End: 2022-08-04

## 2022-08-04 DIAGNOSIS — R78.81 BACTEREMIA: ICD-10-CM

## 2022-08-04 LAB
AST SERPL W P-5'-P-CCNC: 37 U/L (ref 10–50)
BACTERIA BONE ANAEROBE+AEROBE CULT: NORMAL
BASOPHILS # BLD AUTO: 0 10E3/UL (ref 0–0.2)
BASOPHILS NFR BLD AUTO: 1 %
BUN SERPL-MCNC: 22.4 MG/DL (ref 8–23)
CREAT SERPL-MCNC: 0.94 MG/DL (ref 0.67–1.17)
CRP SERPL-MCNC: 5.55 MG/L
EOSINOPHIL # BLD AUTO: 0.3 10E3/UL (ref 0–0.7)
EOSINOPHIL NFR BLD AUTO: 5 %
ERYTHROCYTE [DISTWIDTH] IN BLOOD BY AUTOMATED COUNT: 14.6 % (ref 10–15)
GFR SERPL CREATININE-BSD FRML MDRD: >90 ML/MIN/1.73M2
HCT VFR BLD AUTO: 34.3 % (ref 40–53)
HGB BLD-MCNC: 10.7 G/DL (ref 13.3–17.7)
IMM GRANULOCYTES # BLD: 0 10E3/UL
IMM GRANULOCYTES NFR BLD: 1 %
LYMPHOCYTES # BLD AUTO: 1.4 10E3/UL (ref 0.8–5.3)
LYMPHOCYTES NFR BLD AUTO: 27 %
MCH RBC QN AUTO: 28 PG (ref 26.5–33)
MCHC RBC AUTO-ENTMCNC: 31.2 G/DL (ref 31.5–36.5)
MCV RBC AUTO: 90 FL (ref 78–100)
MONOCYTES # BLD AUTO: 0.3 10E3/UL (ref 0–1.3)
MONOCYTES NFR BLD AUTO: 7 %
NEUTROPHILS # BLD AUTO: 3.1 10E3/UL (ref 1.6–8.3)
NEUTROPHILS NFR BLD AUTO: 59 %
NRBC # BLD AUTO: 0 10E3/UL
NRBC BLD AUTO-RTO: 0 /100
PLATELET # BLD AUTO: 168 10E3/UL (ref 150–450)
RBC # BLD AUTO: 3.82 10E6/UL (ref 4.4–5.9)
WBC # BLD AUTO: 5.2 10E3/UL (ref 4–11)

## 2022-08-04 PROCEDURE — 82565 ASSAY OF CREATININE: CPT | Performed by: SPECIALIST

## 2022-08-04 PROCEDURE — 86140 C-REACTIVE PROTEIN: CPT | Performed by: SPECIALIST

## 2022-08-04 PROCEDURE — 85025 COMPLETE CBC W/AUTO DIFF WBC: CPT | Performed by: SPECIALIST

## 2022-08-04 PROCEDURE — 84450 TRANSFERASE (AST) (SGOT): CPT | Performed by: SPECIALIST

## 2022-08-04 PROCEDURE — 84520 ASSAY OF UREA NITROGEN: CPT | Performed by: SPECIALIST

## 2022-08-05 ENCOUNTER — PATIENT OUTREACH (OUTPATIENT)
Dept: CARE COORDINATION | Facility: CLINIC | Age: 62
End: 2022-08-05

## 2022-08-05 ENCOUNTER — HOME INFUSION (PRE-WILLOW HOME INFUSION) (OUTPATIENT)
Dept: PHARMACY | Facility: CLINIC | Age: 62
End: 2022-08-05

## 2022-08-05 NOTE — PROGRESS NOTES
Clinic Care Coordination Contact  Community Health Worker Initial Outreach    CHW Initial Information Gathering:  Referral Source: Other, specify (CC RN delegation)  Preferred Hospital: [facility]  [phone]  Current living arrangement:: Not Assessed  No PCP office visit in Past Year: No  Transportation means:: None (Pt will be directed on if he can drive at appointment on 8/10/2022)       Patient accepts CC: No, Pt declined. Patient will be sent Care Coordination introduction letter for future reference.     8-5, CHW:    Writer was able to connect with the Pt and introduce self/care coordination.     Pt stated that transportation could be an issue. Pt states that this depends on if he is cleared to drive at his appointment on 8/10/2022.     Pt states that if he cannot drive he may need a ride fairly quickly.     Pt is agreeable with Writer sending Pt transportation resources to his [facility].     Pt had no other questions or concerns.     Writer encouraged Pt to reach back out to CC if he has any questions or concerns; Pt agreeable with plan.     RILEY Townsend. Public Health  Community Health Worker  [facility]  Clinic Care Coordination  [phone]

## 2022-08-05 NOTE — TELEPHONE ENCOUNTER
Great work! Thank you so much!    Aggie Parekh RN Care Coordinator  [facility]  Email: [email or web address]  Phone: [phone]

## 2022-08-05 NOTE — PROGRESS NOTES
This is a recent snapshot of the patient's [facility] medical record.  For current drug dose and complete information and questions, call [phone] or In Basket pool, fv home infusion ([id number])  CSN Number:  [id number]

## 2022-08-05 NOTE — LETTER
[facility] CARE COORDINATION  [facility]  August 5, 2022    Crispin oRjas  [address] [facility] [address]      Dear Crispin,      I am a clinic community health worker who works with Johann Serna DO with the [facility]. I wanted to thank you for spending the time to talk with me.  Below is a description of clinic care coordination and how I can further assist you.      The clinic care coordination team is made up of a registered nurse, , financial resource worker and community health worker who understand the health care system. The goal of clinic care coordination is to help you manage your health and improve access to the health care system. Our team works alongside your provider to assist you in determining your health and social needs. We can help you obtain health care and community resources, providing you with necessary information and education. We can work with you through any barriers and develop a care plan that helps coordinate and strengthen the communication between you and your care team.    Here are some resources that may be beneficial to you:    [facility] Transportation Guide:  [email or web address]./Transportation/GettingAround/Documents/[facility]TransportationResourceGuide.pdf    I've highlighted a few and some that are not included on the guide pdf above:    [employer] Grandparent   [phone]   [email or web address] [email or web address]    A service that uses [employer] and Uber. Participants can use the  hotline to call rather than using a smart phone to make trips. Text messages are used to alert the user of pick ups and drop offs. Small convenience fee is added to overall trip fare.     [employer] mobility   [phone]  https://metrocouncil.org/Transportation/Services/Metro-Mobility-Home.aspx   must be approved first. Call for an application.   Shared public transportation.   For individuals with disabilities as certified by [employer].  "  Reservations taken 7 days a week.  Call in advance, up to four days in advance.  *ADA certified riders unable to schedule a ride on [employer] can be reimbursed up to $20 if they use a private taxi.  Rides are for any purpose.     [employer] Your [employer]: Transportation Services. Schedule [employer] Your [employer]'s Transportation Service today by calling [phone].  https://Stemnion.org/services/#transportation    For quick turn around times    [employer]  \"Our goal at [employer] (\[facility] [place] [facility]\"") is to provide our clients with transportation and homemaking services that allows them to remain in their homes and have the freedom to go where they choose. Whether our clients need help with light housekeeping, a ride to meet a friend for lunch or transportation to run errands, we are here to help and there when you need us.\"  9 [phone]  https://DogSpot/      Please feel free to contact me with any questions or concerns regarding care coordination and what we can offer.      We are focused on providing you with the highest-quality healthcare experience possible.      Sincerely,     RILEY Townsend. Public Health  Community Health Worker  [facility]  Clinic Care Coordination  [phone]                "

## 2022-08-05 NOTE — PROGRESS NOTES
Clinic Care Coordination Contact  [facility]/Voicemail       Clinical Data: Care Coordinator Outreach  Outreach attempted x 2.  Left message on patient's voicemail with call back information and requested return call.    Plan: Care Coordinator will send care coordination introduction letter with care coordinator contact information and explanation of care coordination services via [employer]. Care Coordinator will do no further outreaches at this time.    RILEY Townsend. Public Health  Community Health Worker  [facility]  Clinic Care Coordination  [phone]

## 2022-08-09 NOTE — PROGRESS NOTES
This is a recent snapshot of the patient's [facility] medical record.  For current drug dose and complete information and questions, call [phone] or In Basket pool, fv home infusion ([id number])  CSN Number:  [id number]     No

## 2022-08-10 ENCOUNTER — OFFICE VISIT (OUTPATIENT)
Dept: PODIATRY | Facility: CLINIC | Age: 62
End: 2022-08-10
Payer: COMMERCIAL

## 2022-08-10 ENCOUNTER — IMMUNIZATION (OUTPATIENT)
Dept: FAMILY MEDICINE | Facility: CLINIC | Age: 62
End: 2022-08-10

## 2022-08-10 VITALS
DIASTOLIC BLOOD PRESSURE: 62 MMHG | WEIGHT: 224 LBS | HEIGHT: 71 IN | BODY MASS INDEX: 31.36 KG/M2 | SYSTOLIC BLOOD PRESSURE: 108 MMHG

## 2022-08-10 DIAGNOSIS — M86.171 OTHER ACUTE OSTEOMYELITIS OF RIGHT FOOT (H): Primary | ICD-10-CM

## 2022-08-10 PROCEDURE — 91306 COVID-19,PF,MODERNA (18+ YRS BOOSTER .25ML): CPT

## 2022-08-10 PROCEDURE — 99024 POSTOP FOLLOW-UP VISIT: CPT | Performed by: PODIATRIST

## 2022-08-10 PROCEDURE — 0064A COVID-19,PF,MODERNA (18+ YRS BOOSTER .25ML): CPT

## 2022-08-10 NOTE — LETTER
8/10/2022         RE: Crispin Rojas  [address] [facility] [address]        Dear Colleague,    Thank you for referring your patient, Crispin Rojas, to the [facility]. Please see a copy of my visit note below.    [facility]/FOOT & ANKLE SURGERY  CLINIC NOTE    CHIEF COMPLAINT:  right foot    PATIENT HISTORY:  Crispin Rojas is a 62 year old male  who presents for follow up for right foot. He was seen in the hospital for a right foot infection and had osteomyelitis to the entire first metatarsal. Long discussions were had in the hospital and patient did not want to have the entire metatarsal removed due to being relatively active. He was discharged with IV abx and started hyperbaric oxygen treatment to treat osteomyelitis.   -Since being discharged, he's been doing well. He's been changing the dressing and having essentially no drainage. Denies pain to site. Denies N/F/V/C/D          Review of Systems:  A 10 point review of systems was performed and is positive for that noted above in the patient history.  All other areas are negative.     PAST MEDICAL HISTORY:   Past Medical History:   Diagnosis Date     Cerebral infarction (H)     2010     Chronic infection      H/O blood clots 2022     Hyperlipidemia LDL goal <70      Hypertension      Numbness and tingling      Obesity      GILA (obstructive sleep apnea) 10/22/2018    CPAP intolerant     PVD (peripheral vascular disease) (H)     s/p left partial toe amputation     Renal stone      Tremor      Type 2 diabetes mellitus with diabetic polyneuropathy, with long-term current use of insulin (H)         PAST SURGICAL HISTORY:   Past Surgical History:   Procedure Laterality Date     AMPUTATE FOOT Left 8/18/2016    Procedure: AMPUTATE FOOT;  Surgeon: Jack Younger DPM;  [facility]: [facility]     AMPUTATE TOE(S) Right 2/1/2016    Procedure: AMPUTATE TOE(S);  Surgeon: Rachelle Manriquez DPM, Pod;  Location: [facility] OR      AMPUTATE TOE(S) Right 8/2/2019    Procedure: Right fourth toe partial amputation for treatment of osteomyelitis.;  Surgeon: Jack Younger DPM;  [facility]     AMPUTATE TOE(S) Left 11/8/2019    Procedure: Left second toe amputation at the metatarsophalangeal joint.;  Surgeon: Rachelle Manriquez DPM, Podiatry/Foot and Ankle Surgery;  Location: [facility] OR     AMPUTATE TOE(S) Right 6/5/2022    Procedure: AMPUTATION OF RIGHT GREAT TOE;  Surgeon: Wili Quiles DPM;  Location: [facility] OR     AMPUTATE TOE(S) Right 7/28/2022    Procedure: Right foot partial first metatarsal resection;  Surgeon: Tiny Soto DPM;  Location: [facility] OR     ANGIOGRAM       BIOPSY BONE FOOT Right 7/24/2022    Procedure: Bone biopsy, right first metatarsal.;  Surgeon: Valentino Molina DPM;  Location: [facility] OR     COLONOSCOPY       COMBINED CYSTOSCOPY, RETROGRADES, URETEROSCOPY, INSERT STENT Left 8/21/2016    Procedure: COMBINED CYSTOSCOPY, RETROGRADES, URETEROSCOPY, INSERT STENT;  Surgeon: Artemio Valenzuela MD;  Location: [facility]     COMBINED CYSTOSCOPY, RETROGRADES, URETEROSCOPY, LASER HOLMIUM LITHOTRIPSY URETER(S), INSERT STENT Left 10/3/2016    Procedure: COMBINED CYSTOSCOPY, RETROGRADES, URETEROSCOPY, LASER HOLMIUM LITHOTRIPSY URETER(S), INSERT STENT;  Surgeon: Artemio Valenzuela MD;  Location: [facility]     EXTRACORPOREAL SHOCK WAVE LITHOTRIPSY (ESWL) Left 9/8/2016    Procedure: EXTRACORPOREAL SHOCK WAVE LITHOTRIPSY (ESWL);  Surgeon: Artemio Valenzuela MD;  Location: [facility] OR     INCISION AND DRAINAGE FOOT, COMBINED Right 7/24/2022    Procedure: Incision and drainage, right foot ;  Surgeon: Valentino Molina DPM;  Location: [facility] OR     RECESSION GASTROCNEMIUS Right 2/1/2016    Procedure: RECESSION GASTROCNEMIUS;  Surgeon: Rachelle Manriquez DPM, Pod;  Location: [facility] OR        MEDICATIONS:  Reviewed in Epic.     ALLERGIES:    Allergies   Allergen Reactions     Bactrim [Sulfamethoxazole W/Trimethoprim] Nausea and Vomiting     Sulfa  Drugs Nausea     Niacin Swelling     SIMCOR caused edema in extremities     Simvastatin Swelling     SIMCOR caused edema in extremities        SOCIAL HISTORY:   Social History     Socioeconomic History     Marital status:      Spouse name: Sydney     Number of children: 2     Years of education: Not on file     Highest education level: Master's degree (e.g., MA, MS, Arleth, MEd, MSW, ELIANA)   Occupational History     Occupation: marketing     Employer: [employer]     Comment: [employer]   Tobacco Use     Smoking status: Never Smoker     Smokeless tobacco: Never Used   Vaping Use     Vaping Use: Never used   Substance and Sexual Activity     Alcohol use: Yes     Alcohol/week: 0.0 standard drinks     Comment: rare---red wine 3x per month     Drug use: No     Sexual activity: Not Currently     Partners: Female   Other Topics Concern     Parent/sibling w/ CABG, MI or angioplasty before 65F 55M? No   Social History Narrative     Not on file     Social Determinants of Health     Financial Resource Strain: Low Risk      Difficulty of Paying Living Expenses: Not very hard   Food Insecurity: No Food Insecurity     Worried About Running Out of Food in the Last Year: Never true     Ran Out of Food in the Last Year: Never true   Transportation Needs: No Transportation Needs     Lack of Transportation (Medical): No     Lack of Transportation (Non-Medical): No   Physical Activity: Sufficiently Active     Days of Exercise per Week: 4 days     Minutes of Exercise per Session: 40 min   Stress: No Stress Concern Present     Feeling of Stress : Not at all   Social Connections: Moderately Isolated     Frequency of Communication with Friends and Family: Once a week     Frequency of Social Gatherings with Friends and Family: Never     Attends Taoism Services: More than 4 times per year     Active Member of Clubs or Organizations: No     Attends Club or Organization Meetings: Not on file     Marital Status:    Intimate  "Partner Violence: Not on file   Housing Stability: Low Risk      Unable to Pay for Housing in the Last Year: No     Number of Places Lived in the Last Year: 2     Unstable Housing in the Last Year: No        FAMILY HISTORY:   Family History   Problem Relation Age of Onset     Arthritis Mother         SLE     Connective Tissue Disorder Mother         lupus     Diabetes Mother      Cerebrovascular Disease Mother      Cancer Mother      Diabetes Father      Diabetes Maternal Grandfather      Diabetes Sister         EXAM:Vitals: /62   Ht 1.803 m (5' 11\")   Wt 101.6 kg (224 lb)   BMI 31.24 kg/m    BMI= Body mass index is 31.24 kg/m .      General appearance: Patient is alert and fully cooperative with history & exam.  No sign of distress is noted during the visit.      Respiratory: Breathing is regular & unlabored while sitting.      HEENT: Hearing is intact to spoken word.  Speech is clear.  No gross evidence of visual impairment that would impact ambulation.      Dermatologic: Right foot incision site evaluated, well coapted and without cellulitis to site. Nontender. No edema. No clinical signs of infection.      Vascular: Dorsalis pedis and posterior tibial pulses are intact & regular bilaterally.  CFT and skin temperature is normal to both lower extremities.       Neurologic: Lower extremity sensation is diminished, bilateral foot, to light touch.  No evidence of neurological-based weakness or contracture in the lower extremities.       Musculoskeletal: Patient is ambulatory without an assistive device or brace.  S/p right first ray resection    Psychiatric: Affect is pleasant & appropriate.    Foot, Right; Bone Resection          0 Result Notes    Culture 1+ Staphylococcus aureus Abnormal            Resulting Agency: [facility]       Susceptibility     Staphylococcus aureus     JEFFREY     Clindamycin <=0.25 ug/mL Susceptible 1     Erythromycin >=8 ug/mL Resistant     Gentamicin <=0.5 ug/mL Susceptible     Oxacillin " <=0.25 ug/mL Susceptible 2     Tetracycline <=1 ug/mL Susceptible     Trimethoprim/Sulfamethoxazole <=0.5/9.5 u... Susceptible     Vancomycin 1 ug/mL Susceptible               Final Diagnosis   A.  Bone, right foot first metatarsal proximal margin: Biopsy:  - Bone with partial necrosis and acute inflammation, consistent with acute osteomyelitis         ASSESSMENT:   1. S/p right first ray resection on 7/28, with residual osteomyelitis      MEDICAL DECISION MAKING:   -Discussed all findings with patient. Incision site healing appropriately, which is encouraging given patient's known osteomyelitis. CRP and labs reviewed which are also decreasing.   -Has continued follow up with infectious disease.   -Has started HBO and has a continued treatment plan   -WB to heel in CAM boot. Patient now okay to drive and advance WB some.   -F/u in 2 weeks. Continued close monitoring for incision site breakdown or drainage.          Tiny Soto DPM     [place] [facility] of Podiatry/Foot & Ankle Surgery                  Again, thank you for allowing me to participate in the care of your patient.        Sincerely,        Tiny Soto DPM

## 2022-08-11 ENCOUNTER — LAB REQUISITION (OUTPATIENT)
Dept: LAB | Facility: CLINIC | Age: 62
End: 2022-08-11
Payer: COMMERCIAL

## 2022-08-11 ENCOUNTER — HOME INFUSION (PRE-WILLOW HOME INFUSION) (OUTPATIENT)
Dept: PHARMACY | Facility: CLINIC | Age: 62
End: 2022-08-11

## 2022-08-11 DIAGNOSIS — R78.81 BACTEREMIA: ICD-10-CM

## 2022-08-11 LAB
AST SERPL W P-5'-P-CCNC: 32 U/L (ref 10–50)
BASOPHILS # BLD AUTO: 0 10E3/UL (ref 0–0.2)
BASOPHILS NFR BLD AUTO: 1 %
BUN SERPL-MCNC: 21.9 MG/DL (ref 8–23)
CREAT SERPL-MCNC: 0.95 MG/DL (ref 0.67–1.17)
CRP SERPL-MCNC: 13.99 MG/L
EOSINOPHIL # BLD AUTO: 0.1 10E3/UL (ref 0–0.7)
EOSINOPHIL NFR BLD AUTO: 2 %
ERYTHROCYTE [DISTWIDTH] IN BLOOD BY AUTOMATED COUNT: 14.6 % (ref 10–15)
ERYTHROCYTE [SEDIMENTATION RATE] IN BLOOD BY WESTERGREN METHOD: 23 MM/HR (ref 0–20)
GFR SERPL CREATININE-BSD FRML MDRD: >90 ML/MIN/1.73M2
HCT VFR BLD AUTO: 33.9 % (ref 40–53)
HGB BLD-MCNC: 10.5 G/DL (ref 13.3–17.7)
IMM GRANULOCYTES # BLD: 0 10E3/UL
IMM GRANULOCYTES NFR BLD: 0 %
LYMPHOCYTES # BLD AUTO: 0.6 10E3/UL (ref 0.8–5.3)
LYMPHOCYTES NFR BLD AUTO: 15 %
MCH RBC QN AUTO: 28.1 PG (ref 26.5–33)
MCHC RBC AUTO-ENTMCNC: 31 G/DL (ref 31.5–36.5)
MCV RBC AUTO: 91 FL (ref 78–100)
MONOCYTES # BLD AUTO: 0.3 10E3/UL (ref 0–1.3)
MONOCYTES NFR BLD AUTO: 7 %
NEUTROPHILS # BLD AUTO: 3 10E3/UL (ref 1.6–8.3)
NEUTROPHILS NFR BLD AUTO: 75 %
NRBC # BLD AUTO: 0 10E3/UL
NRBC BLD AUTO-RTO: 0 /100
PLATELET # BLD AUTO: 126 10E3/UL (ref 150–450)
RBC # BLD AUTO: 3.74 10E6/UL (ref 4.4–5.9)
WBC # BLD AUTO: 4 10E3/UL (ref 4–11)

## 2022-08-11 PROCEDURE — 85652 RBC SED RATE AUTOMATED: CPT | Performed by: SPECIALIST

## 2022-08-11 PROCEDURE — 86140 C-REACTIVE PROTEIN: CPT | Performed by: SPECIALIST

## 2022-08-11 PROCEDURE — 85025 COMPLETE CBC W/AUTO DIFF WBC: CPT | Performed by: SPECIALIST

## 2022-08-11 PROCEDURE — 84450 TRANSFERASE (AST) (SGOT): CPT | Performed by: SPECIALIST

## 2022-08-11 PROCEDURE — 82565 ASSAY OF CREATININE: CPT | Performed by: SPECIALIST

## 2022-08-11 PROCEDURE — 84520 ASSAY OF UREA NITROGEN: CPT | Performed by: SPECIALIST

## 2022-08-11 NOTE — PROGRESS NOTES
[facility] PODIATRY/FOOT & ANKLE SURGERY  CLINIC NOTE    CHIEF COMPLAINT:  right foot    PATIENT HISTORY:  Crispin Rojas is a 62 year old male  who presents for follow up for right foot. He was seen in the hospital for a right foot infection and had osteomyelitis to the entire first metatarsal. Long discussions were had in the hospital and patient did not want to have the entire metatarsal removed due to being relatively active. He was discharged with IV abx and started hyperbaric oxygen treatment to treat osteomyelitis.   -Since being discharged, he's been doing well. He's been changing the dressing and having essentially no drainage. Denies pain to site. Denies N/F/V/C/D          Review of Systems:  A 10 point review of systems was performed and is positive for that noted above in the patient history.  All other areas are negative.     PAST MEDICAL HISTORY:   Past Medical History:   Diagnosis Date     Cerebral infarction (H)     2010     Chronic infection      H/O blood clots 2022     Hyperlipidemia LDL goal <70      Hypertension      Numbness and tingling      Obesity      GILA (obstructive sleep apnea) 10/22/2018    CPAP intolerant     PVD (peripheral vascular disease) (H)     s/p left partial toe amputation     Renal stone      Tremor      Type 2 diabetes mellitus with diabetic polyneuropathy, with long-term current use of insulin (H)         PAST SURGICAL HISTORY:   Past Surgical History:   Procedure Laterality Date     AMPUTATE FOOT Left 8/18/2016    Procedure: AMPUTATE FOOT;  Surgeon: Jack Younger DPM;  [facility]     AMPUTATE TOE(S) Right 2/1/2016    Procedure: AMPUTATE TOE(S);  Surgeon: Rachelle Manriquez DPM, Pod;  [facility]     AMPUTATE TOE(S) Right 8/2/2019    Procedure: Right fourth toe partial amputation for treatment of osteomyelitis.;  Surgeon: Jack Younger DPM;  [facility]: [facility]     AMPUTATE TOE(S) Left 11/8/2019    Procedure: Left second toe amputation at the  metatarsophalangeal joint.;  Surgeon: Rachelle Manriquez DPM, Podiatry/Foot and Ankle Surgery;  Location: [facility]     AMPUTATE TOE(S) Right 6/5/2022    Procedure: AMPUTATION OF RIGHT GREAT TOE;  Surgeon: Wili Quiles DPM;  Location: [facility] OR     AMPUTATE TOE(S) Right 7/28/2022    Procedure: Right foot partial first metatarsal resection;  Surgeon: Tiny Soto DPM;  Location: [facility] OR     ANGIOGRAM       BIOPSY BONE FOOT Right 7/24/2022    Procedure: Bone biopsy, right first metatarsal.;  Surgeon: Valentino Molina DPM;  Location: [facility] OR     COLONOSCOPY       COMBINED CYSTOSCOPY, RETROGRADES, URETEROSCOPY, INSERT STENT Left 8/21/2016    Procedure: COMBINED CYSTOSCOPY, RETROGRADES, URETEROSCOPY, INSERT STENT;  Surgeon: Artemio Valenzuela MD;  Location: [facility] OR     COMBINED CYSTOSCOPY, RETROGRADES, URETEROSCOPY, LASER HOLMIUM LITHOTRIPSY URETER(S), INSERT STENT Left 10/3/2016    Procedure: COMBINED CYSTOSCOPY, RETROGRADES, URETEROSCOPY, LASER HOLMIUM LITHOTRIPSY URETER(S), INSERT STENT;  Surgeon: Artemio Valenzuela MD;  Location: [facility] OR     EXTRACORPOREAL SHOCK WAVE LITHOTRIPSY (ESWL) Left 9/8/2016    Procedure: EXTRACORPOREAL SHOCK WAVE LITHOTRIPSY (ESWL);  Surgeon: Artemio Valenzuela MD;  Location: [facility] OR     INCISION AND DRAINAGE FOOT, COMBINED Right 7/24/2022    Procedure: Incision and drainage, right foot ;  Surgeon: Valentino Molina DPM;  Location: [facility] OR     RECESSION GASTROCNEMIUS Right 2/1/2016    Procedure: RECESSION GASTROCNEMIUS;  Surgeon: Rachelle Manriquez DPM, Pod;  Location: [facility] OR        MEDICATIONS:  Reviewed in Epic.     ALLERGIES:    Allergies   Allergen Reactions     Bactrim [Sulfamethoxazole W/Trimethoprim] Nausea and Vomiting     Sulfa Drugs Nausea     Niacin Swelling     SIMCOR caused edema in extremities     Simvastatin Swelling     SIMCOR caused edema in extremities        SOCIAL HISTORY:   Social History     Socioeconomic History     Marital status:      Spouse name:  Sydney     Number of children: 2     Years of education: Not on file     Highest education level: Master's degree (e.g., MA, MS, Arleth, MEd, MSW, ELIANA)   Occupational History     Occupation: marketing     Employer: [employer]     Comment: [employer]   Tobacco Use     Smoking status: Never Smoker     Smokeless tobacco: Never Used   Vaping Use     Vaping Use: Never used   Substance and Sexual Activity     Alcohol use: Yes     Alcohol/week: 0.0 standard drinks     Comment: rare---red wine 3x per month     Drug use: No     Sexual activity: Not Currently     Partners: Female   Other Topics Concern     Parent/sibling w/ CABG, MI or angioplasty before 65F 55M? No   Social History Narrative     Not on file     Social Determinants of Health     Financial Resource Strain: Low Risk      Difficulty of Paying Living Expenses: Not very hard   Food Insecurity: No Food Insecurity     Worried About Running Out of Food in the Last Year: Never true     Ran Out of Food in the Last Year: Never true   Transportation Needs: No Transportation Needs     Lack of Transportation (Medical): No     Lack of Transportation (Non-Medical): No   Physical Activity: Sufficiently Active     Days of Exercise per Week: 4 days     Minutes of Exercise per Session: 40 min   Stress: No Stress Concern Present     Feeling of Stress : Not at all   Social Connections: Moderately Isolated     Frequency of Communication with Friends and Family: Once a week     Frequency of Social Gatherings with Friends and Family: Never     Attends [employer] Services: More than 4 times per year     Active Member of Clubs or Organizations: No     Attends Club or Organization Meetings: Not on file     Marital Status:    Intimate Partner Violence: Not on file   Housing Stability: Low Risk      Unable to Pay for Housing in the Last Year: No     Number of Places Lived in the Last Year: 2     Unstable Housing in the Last Year: No        FAMILY HISTORY:   Family History   Problem  "Relation Age of Onset     Arthritis Mother         SLE     Connective Tissue Disorder Mother         lupus     Diabetes Mother      Cerebrovascular Disease Mother      Cancer Mother      Diabetes Father      Diabetes Maternal Grandfather      Diabetes Sister         EXAM:Vitals: /62   Ht 1.803 m (5' 11\")   Wt 101.6 kg (224 lb)   BMI 31.24 kg/m    BMI= Body mass index is 31.24 kg/m .      General appearance: Patient is alert and fully cooperative with history & exam.  No sign of distress is noted during the visit.      Respiratory: Breathing is regular & unlabored while sitting.      HEENT: Hearing is intact to spoken word.  Speech is clear.  No gross evidence of visual impairment that would impact ambulation.      Dermatologic: Right foot incision site evaluated, well coapted and without cellulitis to site. Nontender. No edema. No clinical signs of infection.      Vascular: Dorsalis pedis and posterior tibial pulses are intact & regular bilaterally.  CFT and skin temperature is normal to both lower extremities.       Neurologic: Lower extremity sensation is diminished, bilateral foot, to light touch.  No evidence of neurological-based weakness or contracture in the lower extremities.       Musculoskeletal: Patient is ambulatory without an assistive device or brace.  S/p right first ray resection    Psychiatric: Affect is pleasant & appropriate.    Foot, Right; Bone Resection          0 Result Notes    Culture 1+ Staphylococcus aureus Abnormal            Resulting Agency: [facility]       Susceptibility     Staphylococcus aureus     JEFFREY     Clindamycin <=0.25 ug/mL Susceptible 1     Erythromycin >=8 ug/mL Resistant     Gentamicin <=0.5 ug/mL Susceptible     Oxacillin <=0.25 ug/mL Susceptible 2     Tetracycline <=1 ug/mL Susceptible     Trimethoprim/Sulfamethoxazole <=0.5/9.5 u... Susceptible     Vancomycin 1 ug/mL Susceptible               Final Diagnosis   A.  Bone, right foot first metatarsal proximal margin: " Biopsy:  - Bone with partial necrosis and acute inflammation, consistent with acute osteomyelitis         ASSESSMENT:   1. S/p right first ray resection on 7/28, with residual osteomyelitis      MEDICAL DECISION MAKING:   -Discussed all findings with patient. Incision site healing appropriately, which is encouraging given patient's known osteomyelitis. CRP and labs reviewed which are also decreasing.   -Has continued follow up with infectious disease.   -Has started HBO and has a continued treatment plan   -WB to heel in CAM boot. Patient now okay to drive and advance WB some.   -F/u in 2 weeks. Continued close monitoring for incision site breakdown or drainage.          Tiny Soto DPM     [place] Department of Podiatry/Foot & Ankle Surgery

## 2022-08-12 ENCOUNTER — HOME INFUSION (PRE-WILLOW HOME INFUSION) (OUTPATIENT)
Dept: PHARMACY | Facility: CLINIC | Age: 62
End: 2022-08-12

## 2022-08-18 ENCOUNTER — LAB REQUISITION (OUTPATIENT)
Dept: LAB | Facility: CLINIC | Age: 62
End: 2022-08-18
Payer: COMMERCIAL

## 2022-08-18 ENCOUNTER — HOME INFUSION (PRE-WILLOW HOME INFUSION) (OUTPATIENT)
Dept: PHARMACY | Facility: CLINIC | Age: 62
End: 2022-08-18

## 2022-08-18 DIAGNOSIS — R78.81 BACTEREMIA: ICD-10-CM

## 2022-08-18 LAB
AST SERPL W P-5'-P-CCNC: 30 U/L (ref 10–50)
BASOPHILS # BLD AUTO: 0 10E3/UL (ref 0–0.2)
BASOPHILS NFR BLD AUTO: 1 %
BUN SERPL-MCNC: 27.6 MG/DL (ref 8–23)
CREAT SERPL-MCNC: 0.84 MG/DL (ref 0.67–1.17)
CRP SERPL-MCNC: 4.78 MG/L
EOSINOPHIL # BLD AUTO: 0.2 10E3/UL (ref 0–0.7)
EOSINOPHIL NFR BLD AUTO: 6 %
ERYTHROCYTE [DISTWIDTH] IN BLOOD BY AUTOMATED COUNT: 14.4 % (ref 10–15)
GFR SERPL CREATININE-BSD FRML MDRD: >90 ML/MIN/1.73M2
HCT VFR BLD AUTO: 34.6 % (ref 40–53)
HGB BLD-MCNC: 10.6 G/DL (ref 13.3–17.7)
IMM GRANULOCYTES # BLD: 0 10E3/UL
IMM GRANULOCYTES NFR BLD: 0 %
LYMPHOCYTES # BLD AUTO: 1.1 10E3/UL (ref 0.8–5.3)
LYMPHOCYTES NFR BLD AUTO: 32 %
MCH RBC QN AUTO: 28 PG (ref 26.5–33)
MCHC RBC AUTO-ENTMCNC: 30.6 G/DL (ref 31.5–36.5)
MCV RBC AUTO: 91 FL (ref 78–100)
MONOCYTES # BLD AUTO: 0.3 10E3/UL (ref 0–1.3)
MONOCYTES NFR BLD AUTO: 9 %
NEUTROPHILS # BLD AUTO: 1.8 10E3/UL (ref 1.6–8.3)
NEUTROPHILS NFR BLD AUTO: 52 %
NRBC # BLD AUTO: 0 10E3/UL
NRBC BLD AUTO-RTO: 0 /100
PLATELET # BLD AUTO: 135 10E3/UL (ref 150–450)
RBC # BLD AUTO: 3.79 10E6/UL (ref 4.4–5.9)
WBC # BLD AUTO: 3.5 10E3/UL (ref 4–11)

## 2022-08-18 PROCEDURE — 85025 COMPLETE CBC W/AUTO DIFF WBC: CPT | Performed by: SPECIALIST

## 2022-08-18 PROCEDURE — 84450 TRANSFERASE (AST) (SGOT): CPT | Performed by: SPECIALIST

## 2022-08-18 PROCEDURE — 84520 ASSAY OF UREA NITROGEN: CPT | Performed by: SPECIALIST

## 2022-08-18 PROCEDURE — 86140 C-REACTIVE PROTEIN: CPT | Performed by: SPECIALIST

## 2022-08-18 PROCEDURE — 82565 ASSAY OF CREATININE: CPT | Performed by: SPECIALIST

## 2022-08-19 ENCOUNTER — HOME INFUSION (PRE-WILLOW HOME INFUSION) (OUTPATIENT)
Dept: PHARMACY | Facility: CLINIC | Age: 62
End: 2022-08-19

## 2022-08-22 ENCOUNTER — HOME INFUSION (PRE-WILLOW HOME INFUSION) (OUTPATIENT)
Dept: PHARMACY | Facility: CLINIC | Age: 62
End: 2022-08-22

## 2022-08-22 ENCOUNTER — LAB REQUISITION (OUTPATIENT)
Dept: LAB | Facility: CLINIC | Age: 62
End: 2022-08-22
Payer: COMMERCIAL

## 2022-08-22 DIAGNOSIS — R78.81 BACTEREMIA: ICD-10-CM

## 2022-08-22 LAB
ANION GAP SERPL CALCULATED.3IONS-SCNC: 11 MMOL/L (ref 7–15)
BASOPHILS # BLD AUTO: 0 10E3/UL (ref 0–0.2)
BASOPHILS NFR BLD AUTO: 1 %
BUN SERPL-MCNC: 27.1 MG/DL (ref 8–23)
CALCIUM SERPL-MCNC: 9.9 MG/DL (ref 8.8–10.2)
CHLORIDE SERPL-SCNC: 102 MMOL/L (ref 98–107)
CREAT SERPL-MCNC: 0.8 MG/DL (ref 0.67–1.17)
CREAT SERPL-MCNC: 0.8 MG/DL (ref 0.67–1.17)
DEPRECATED HCO3 PLAS-SCNC: 27 MMOL/L (ref 22–29)
EOSINOPHIL # BLD AUTO: 0.3 10E3/UL (ref 0–0.7)
EOSINOPHIL NFR BLD AUTO: 6 %
ERYTHROCYTE [DISTWIDTH] IN BLOOD BY AUTOMATED COUNT: 14.6 % (ref 10–15)
GFR SERPL CREATININE-BSD FRML MDRD: >90 ML/MIN/1.73M2
GFR SERPL CREATININE-BSD FRML MDRD: >90 ML/MIN/1.73M2
GLUCOSE SERPL-MCNC: 119 MG/DL (ref 70–99)
HCT VFR BLD AUTO: 38.4 % (ref 40–53)
HGB BLD-MCNC: 11.7 G/DL (ref 13.3–17.7)
IMM GRANULOCYTES # BLD: 0 10E3/UL
IMM GRANULOCYTES NFR BLD: 0 %
LYMPHOCYTES # BLD AUTO: 1.4 10E3/UL (ref 0.8–5.3)
LYMPHOCYTES NFR BLD AUTO: 30 %
MCH RBC QN AUTO: 28 PG (ref 26.5–33)
MCHC RBC AUTO-ENTMCNC: 30.5 G/DL (ref 31.5–36.5)
MCV RBC AUTO: 92 FL (ref 78–100)
MONOCYTES # BLD AUTO: 0.4 10E3/UL (ref 0–1.3)
MONOCYTES NFR BLD AUTO: 8 %
NEUTROPHILS # BLD AUTO: 2.6 10E3/UL (ref 1.6–8.3)
NEUTROPHILS NFR BLD AUTO: 55 %
NRBC # BLD AUTO: 0 10E3/UL
NRBC BLD AUTO-RTO: 0 /100
PLATELET # BLD AUTO: 145 10E3/UL (ref 150–450)
POTASSIUM SERPL-SCNC: 4.3 MMOL/L (ref 3.4–5.3)
RBC # BLD AUTO: 4.18 10E6/UL (ref 4.4–5.9)
SODIUM SERPL-SCNC: 140 MMOL/L (ref 136–145)
WBC # BLD AUTO: 4.7 10E3/UL (ref 4–11)

## 2022-08-22 PROCEDURE — 80048 BASIC METABOLIC PNL TOTAL CA: CPT | Performed by: SPECIALIST

## 2022-08-22 PROCEDURE — 85025 COMPLETE CBC W/AUTO DIFF WBC: CPT | Performed by: SPECIALIST

## 2022-08-22 NOTE — PROGRESS NOTES
This is a recent snapshot of the patient's [facility] medical record.  For current drug dose and complete information and questions, call [phone] or In Basket pool, fv home infusion ([id number])  CSN Number:  [id number]

## 2022-08-22 NOTE — PROGRESS NOTES
This is a recent snapshot of the patient's [facility] medical record.  For current drug dose and complete information and questions, call [phone] or In Basket pool, fv home infusion ([id number])  CSN Number:  [id number]

## 2022-08-23 ENCOUNTER — HOME INFUSION (PRE-WILLOW HOME INFUSION) (OUTPATIENT)
Dept: PHARMACY | Facility: CLINIC | Age: 62
End: 2022-08-23

## 2022-08-23 NOTE — PROGRESS NOTES
This is a recent snapshot of the patient's [facility] medical record.  For current drug dose and complete information and questions, call [phone] or In [facility] pool, fv home infusion ([id number])  CSN Number:  [id number]

## 2022-08-24 ENCOUNTER — OFFICE VISIT (OUTPATIENT)
Dept: PODIATRY | Facility: CLINIC | Age: 62
End: 2022-08-24
Payer: COMMERCIAL

## 2022-08-24 VITALS — SYSTOLIC BLOOD PRESSURE: 118 MMHG | DIASTOLIC BLOOD PRESSURE: 68 MMHG

## 2022-08-24 DIAGNOSIS — Z89.411 STATUS POST AMPUTATION OF RIGHT GREAT TOE (H): ICD-10-CM

## 2022-08-24 DIAGNOSIS — L97.521 ULCER OF LEFT FOOT, LIMITED TO BREAKDOWN OF SKIN (H): ICD-10-CM

## 2022-08-24 DIAGNOSIS — M86.171 OTHER ACUTE OSTEOMYELITIS OF RIGHT FOOT (H): Primary | ICD-10-CM

## 2022-08-24 PROCEDURE — 11042 DBRDMT SUBQ TIS 1ST 20SQCM/<: CPT | Mod: 58 | Performed by: PODIATRIST

## 2022-08-24 PROCEDURE — 99024 POSTOP FOLLOW-UP VISIT: CPT | Performed by: PODIATRIST

## 2022-08-24 NOTE — PATIENT INSTRUCTIONS
Thank you for choosing [facility] Podiatry / Foot & Ankle Surgery!    DR. SAGE'S CLINIC:  [facility] SCHEDULE SURGERY: [phone]   [address] BILLING QUESTIONS: [phone]   [place] [address] APPOINTMENTS: [phone]   PH: [phone] CONSUMER PHILLIPS LINE:[phone]   FAX: [phone]      Follow up: 2 week     Next steps: ok to get foot wet, dry really well. Monitor for any drainage.  Start wearing surgical shoe instead of boot.  Call to make appointment for diabetic shoe as discussed.               Flu vaccines are now available at all [facility] clinics and retail pharmacies across the [facility]. Appointments are required for clinic locations. To schedule an appointment online, please log into [employer] or create an account if you are a new user. You can also call [phone], or simply walk in at one of the [facility] retail pharmacy locations.

## 2022-08-24 NOTE — PROGRESS NOTES
[facility]  CLINIC NOTE    CHIEF COMPLAINT:  right foot    PATIENT HISTORY:  Crispin Rojas is a 62 year old male  who presents for follow up for right foot. He's been going to HBO. States no drainage from right foot. Denies pain to site. States that he's hoping to get back to golfing soon. Does note some drainage to his left foot. Had follow up with infectious disease who is monitoring labs.     Previously:He was seen in the hospital for a right foot infection and had osteomyelitis to the entire first metatarsal. Long discussions were had in the hospital and patient did not want to have the entire metatarsal removed due to being relatively active. He was discharged with IV abx and started hyperbaric oxygen treatment to treat osteomyelitis.   -Since being discharged, he's been doing well. He's been changing the dressing and having essentially no drainage. Denies pain to site. Denies N/F/V/C/D          Review of Systems:  A 10 point review of systems was performed and is positive for that noted above in the patient history.  All other areas are negative.     PAST MEDICAL HISTORY:   Past Medical History:   Diagnosis Date     Cerebral infarction (H)     2010     Chronic infection      H/O blood clots 2022     Hyperlipidemia LDL goal <70      Hypertension      Numbness and tingling      Obesity      GILA (obstructive sleep apnea) 10/22/2018    CPAP intolerant     PVD (peripheral vascular disease) (H)     s/p left partial toe amputation     Renal stone      Tremor      Type 2 diabetes mellitus with diabetic polyneuropathy, with long-term current use of insulin (H)         PAST SURGICAL HISTORY:   Past Surgical History:   Procedure Laterality Date     AMPUTATE FOOT Left 8/18/2016    Procedure: AMPUTATE FOOT;  Surgeon: Jack Younger DPM;  Location: [facility]     AMPUTATE TOE(S) Right 2/1/2016    Procedure: AMPUTATE TOE(S);  Surgeon: Rachelle Manriquez DPM, Pod;  Location: [facility]     AMPUTATE  TOE(S) Right 8/2/2019    Procedure: Right fourth toe partial amputation for treatment of osteomyelitis.;  Surgeon: Jack Younger DPM;  [facility]     AMPUTATE TOE(S) Left 11/8/2019    Procedure: Left second toe amputation at the metatarsophalangeal joint.;  Surgeon: Rachelle Manriquez DPM, Podiatry/Foot and Ankle Surgery;  [facility]     AMPUTATE TOE(S) Right 6/5/2022    Procedure: AMPUTATION OF RIGHT GREAT TOE;  Surgeon: Wili Quiles DPM;  [facility]     AMPUTATE TOE(S) Right 7/28/2022    Procedure: Right foot partial first metatarsal resection;  Surgeon: Tiny Soto DPM;  Location: [facility]     ANGIOGRAM       BIOPSY BONE FOOT Right 7/24/2022    Procedure: Bone biopsy, right first metatarsal.;  Surgeon: Valentino Molina DPM;  Location: [facility]     COLONOSCOPY       COMBINED CYSTOSCOPY, RETROGRADES, URETEROSCOPY, INSERT STENT Left 8/21/2016    Procedure: COMBINED CYSTOSCOPY, RETROGRADES, URETEROSCOPY, INSERT STENT;  Surgeon: Artemio Valenzuela MD;  [facility]     COMBINED CYSTOSCOPY, RETROGRADES, URETEROSCOPY, LASER HOLMIUM LITHOTRIPSY URETER(S), INSERT STENT Left 10/3/2016    Procedure: COMBINED CYSTOSCOPY, RETROGRADES, URETEROSCOPY, LASER HOLMIUM LITHOTRIPSY URETER(S), INSERT STENT;  Surgeon: Artemio Valenzuela MD;  [facility]     EXTRACORPOREAL SHOCK WAVE LITHOTRIPSY (ESWL) Left 9/8/2016    Procedure: EXTRACORPOREAL SHOCK WAVE LITHOTRIPSY (ESWL);  Surgeon: Artemio Valenzuela MD;  Location: [facility]     INCISION AND DRAINAGE FOOT, COMBINED Right 7/24/2022    Procedure: Incision and drainage, right foot ;  Surgeon: Valentino Molina DPM;  [facility]     RECESSION GASTROCNEMIUS Right 2/1/2016    Procedure: RECESSION GASTROCNEMIUS;  Surgeon: Rachelle Manriquez DPM, Pod;  [facility]        MEDICATIONS:  Reviewed in Epic.     ALLERGIES:    Allergies   Allergen Reactions     Bactrim [Sulfamethoxazole W/Trimethoprim] Nausea and Vomiting     Sulfa Drugs  Nausea     Niacin Swelling     SIMCOR caused edema in extremities     Simvastatin Swelling     SIMCOR caused edema in extremities        SOCIAL HISTORY:   Social History     Socioeconomic History     Marital status:      Spouse name: Sydney     Number of children: 2     Years of education: Not on file     Highest education level: Master's degree (e.g., MA, MS, Arleth, MEd, MSW, ELIANA)   Occupational History     Occupation: marketing     Employer: [employer]     Comment: [employer]   Tobacco Use     Smoking status: Never Smoker     Smokeless tobacco: Never Used   Vaping Use     Vaping Use: Never used   Substance and Sexual Activity     Alcohol use: Yes     Alcohol/week: 0.0 standard drinks     Comment: rare---red wine 3x per month     Drug use: No     Sexual activity: Not Currently     Partners: Female   Other Topics Concern     Parent/sibling w/ CABG, MI or angioplasty before 65F 55M? No   Social History Narrative     Not on file     Social Determinants of Health     Financial Resource Strain: Low Risk      Difficulty of Paying Living Expenses: Not very hard   Food Insecurity: No Food Insecurity     Worried About Running Out of Food in the Last Year: Never true     Ran Out of Food in the Last Year: Never true   Transportation Needs: No Transportation Needs     Lack of Transportation (Medical): No     Lack of Transportation (Non-Medical): No   Physical Activity: Sufficiently Active     Days of Exercise per Week: 4 days     Minutes of Exercise per Session: 40 min   Stress: No Stress Concern Present     Feeling of Stress : Not at all   Social Connections: Moderately Isolated     Frequency of Communication with Friends and Family: Once a week     Frequency of Social Gatherings with Friends and Family: Never     Attends Tenriism Services: More than 4 times per year     Active Member of Clubs or Organizations: No     Attends Club or Organization Meetings: Not on file     Marital Status:    Intimate Partner  Violence: Not on file   Housing Stability: Low Risk      Unable to Pay for Housing in the Last Year: No     Number of Places Lived in the Last Year: 2     Unstable Housing in the Last Year: No        FAMILY HISTORY:   Family History   Problem Relation Age of Onset     Arthritis Mother         SLE     Connective Tissue Disorder Mother         lupus     Diabetes Mother      Cerebrovascular Disease Mother      Cancer Mother      Diabetes Father      Diabetes Maternal Grandfather      Diabetes Sister         EXAM:Vitals: /68   BMI= There is no height or weight on file to calculate BMI.      General appearance: Patient is alert and fully cooperative with history & exam.  No sign of distress is noted during the visit.      Respiratory: Breathing is regular & unlabored while sitting.      HEENT: Hearing is intact to spoken word.  Speech is clear.  No gross evidence of visual impairment that would impact ambulation.      Dermatologic: Right foot incision site evaluated, well coapted and without cellulitis to site. Nontender. No edema. No clinical signs of infection. Sutures removed   Left foot submet one and submet three preulcerative lesions. Debrided.      Vascular: Dorsalis pedis and posterior tibial pulses are intact & regular bilaterally.  CFT and skin temperature is normal to both lower extremities.       Neurologic: Lower extremity sensation is diminished, bilateral foot, to light touch.  No evidence of neurological-based weakness or contracture in the lower extremities.       Musculoskeletal: Patient is ambulatory without an assistive device or brace.  S/p right first ray resection    Psychiatric: Affect is pleasant & appropriate.    Foot, Right; Bone Resection          0 Result Notes    Culture 1+ Staphylococcus aureus Abnormal            Resulting Agency: [facility]       Susceptibility     Staphylococcus aureus     JEFFREY     Clindamycin <=0.25 ug/mL Susceptible 1     Erythromycin >=8 ug/mL Resistant     Gentamicin  <=0.5 ug/mL Susceptible     Oxacillin <=0.25 ug/mL Susceptible 2     Tetracycline <=1 ug/mL Susceptible     Trimethoprim/Sulfamethoxazole <=0.5/9.5 u... Susceptible     Vancomycin 1 ug/mL Susceptible               Final Diagnosis   A.  Bone, right foot first metatarsal proximal margin: Biopsy:  - Bone with partial necrosis and acute inflammation, consistent with acute osteomyelitis     PROCEDURE:   Verbal consent was obtained for debridement. A 15 blade was used to excise the nonivable tissue to the ulcer on the left foot, down to and including subcutaneous tissue. No noted purulence afterwards. Debrided site measures 1cm x .5 x .1 cm. No probe to bone. Well tolerated. Site was cleansed with alcohol.       ASSESSMENT:  1. S/p right first ray resection on 7/28, with residual osteomyelitis --> healing   2. Left foot submet three ulcer       MEDICAL DECISION MAKING:   -Discussed all findings with patient. Incision site healing appropriately, which is encouraging given patient's known osteomyelitis. CRP and labs reviewed which are also decreasing. Has plans to have a new sed rate drawn. ID plans to extend abx and treat for a full 6 weeks.   -Has continued follow up with infectious disease.   -Has started HBO and has a continued treatment plan --> has two weeks left   -Transition from CAM boot to surgical shoe. Order placed to have new CMOs made. Discussed importance of these. Cont ACE bandage to RLE. Bandaid to left.   -F/u in 2 weeks. Continued close monitoring for incision site breakdown or drainage.          Tiny Soto DPM     [facility]/Foot & Ankle Surgery

## 2022-08-24 NOTE — LETTER
8/24/2022         RE: Crispin Rojas  [address] [facility] [address]        Dear Colleague,    Thank you for referring your patient, Crispin Rojas, to the [facility]. Please see a copy of my visit note below.    [facility]  CLINIC NOTE    CHIEF COMPLAINT:  right foot    PATIENT HISTORY:  Crispin Rojas is a 62 year old male  who presents for follow up for right foot. He's been going to HBO. States no drainage from right foot. Denies pain to site. States that he's hoping to get back to golfing soon. Does note some drainage to his left foot. Had follow up with infectious disease who is monitoring labs.     Previously:He was seen in the hospital for a right foot infection and had osteomyelitis to the entire first metatarsal. Long discussions were had in the hospital and patient did not want to have the entire metatarsal removed due to being relatively active. He was discharged with IV abx and started hyperbaric oxygen treatment to treat osteomyelitis.   -Since being discharged, he's been doing well. He's been changing the dressing and having essentially no drainage. Denies pain to site. Denies N/F/V/C/D          Review of Systems:  A 10 point review of systems was performed and is positive for that noted above in the patient history.  All other areas are negative.     PAST MEDICAL HISTORY:   Past Medical History:   Diagnosis Date     Cerebral infarction (H)     2010     Chronic infection      H/O blood clots 2022     Hyperlipidemia LDL goal <70      Hypertension      Numbness and tingling      Obesity      GILA (obstructive sleep apnea) 10/22/2018    CPAP intolerant     PVD (peripheral vascular disease) (H)     s/p left partial toe amputation     Renal stone      Tremor      Type 2 diabetes mellitus with diabetic polyneuropathy, with long-term current use of insulin (H)         PAST SURGICAL HISTORY:   Past Surgical History:   Procedure  Laterality Date     AMPUTATE FOOT Left 8/18/2016    Procedure: AMPUTATE FOOT;  Surgeon: Jack Younger DPM;  [facility]     AMPUTATE TOE(S) Right 2/1/2016    Procedure: AMPUTATE TOE(S);  Surgeon: Rachelle Manriquez DPM, Pod;  [facility]     AMPUTATE TOE(S) Right 8/2/2019    Procedure: Right fourth toe partial amputation for treatment of osteomyelitis.;  Surgeon: Jack Younger DPM;  [facility]     AMPUTATE TOE(S) Left 11/8/2019    Procedure: Left second toe amputation at the metatarsophalangeal joint.;  Surgeon: Rachelle Manriquez DPM, Podiatry/Foot and Ankle Surgery;  [facility]     AMPUTATE TOE(S) Right 6/5/2022    Procedure: AMPUTATION OF RIGHT GREAT TOE;  Surgeon: Wili Quiles DPM;  [facility]     AMPUTATE TOE(S) Right 7/28/2022    Procedure: Right foot partial first metatarsal resection;  Surgeon: Tiny Soto DPM;  Location: [facility]     ANGIOGRAM       BIOPSY BONE FOOT Right 7/24/2022    Procedure: Bone biopsy, right first metatarsal.;  Surgeon: Valentino Molina DPM;  Location: [facility]     COLONOSCOPY       COMBINED CYSTOSCOPY, RETROGRADES, URETEROSCOPY, INSERT STENT Left 8/21/2016    Procedure: COMBINED CYSTOSCOPY, RETROGRADES, URETEROSCOPY, INSERT STENT;  Surgeon: Artemio Valenzuela MD;  [facility]     COMBINED CYSTOSCOPY, RETROGRADES, URETEROSCOPY, LASER HOLMIUM LITHOTRIPSY URETER(S), INSERT STENT Left 10/3/2016    Procedure: COMBINED CYSTOSCOPY, RETROGRADES, URETEROSCOPY, LASER HOLMIUM LITHOTRIPSY URETER(S), INSERT STENT;  Surgeon: Artemio Valenzuela MD;  [facility]     EXTRACORPOREAL SHOCK WAVE LITHOTRIPSY (ESWL) Left 9/8/2016    Procedure: EXTRACORPOREAL SHOCK WAVE LITHOTRIPSY (ESWL);  Surgeon: Artemio Valenzuela MD;  Location[facility]     INCISION AND DRAINAGE FOOT, COMBINED Right 7/24/2022    Procedure: Incision and drainage, right foot ;  Surgeon: Valentino Molina DPM;  [facility]     RECESSION GASTROCNEMIUS Right 2/1/2016     Procedure: RECESSION GASTROCNEMIUS;  Surgeon: Rachelle Manriquez DPM, Pod;  [facility]        MEDICATIONS:  Reviewed in Epic.     ALLERGIES:    Allergies   Allergen Reactions     Bactrim [Sulfamethoxazole W/Trimethoprim] Nausea and Vomiting     Sulfa Drugs Nausea     Niacin Swelling     SIMCOR caused edema in extremities     Simvastatin Swelling     SIMCOR caused edema in extremities        SOCIAL HISTORY:   Social History     Socioeconomic History     Marital status:      Spouse name: Sydney     Number of children: 2     Years of education: Not on file     Highest education level: Master's degree (e.g., MA, MS, Arleth, MEd, MSW, ELIANA)   Occupational History     Occupation: marketing     Employer: [employer]     Comment: [employer]   Tobacco Use     Smoking status: Never Smoker     Smokeless tobacco: Never Used   Vaping Use     Vaping Use: Never used   Substance and Sexual Activity     Alcohol use: Yes     Alcohol/week: 0.0 standard drinks     Comment: rare---red wine 3x per month     Drug use: No     Sexual activity: Not Currently     Partners: Female   Other Topics Concern     Parent/sibling w/ CABG, MI or angioplasty before 65F 55M? No   Social History Narrative     Not on file     Social Determinants of Health     Financial Resource Strain: Low Risk      Difficulty of Paying Living Expenses: Not very hard   Food Insecurity: No Food Insecurity     Worried About Running Out of Food in the Last Year: Never true     Ran Out of Food in the Last Year: Never true   Transportation Needs: No Transportation Needs     Lack of Transportation (Medical): No     Lack of Transportation (Non-Medical): No   Physical Activity: Sufficiently Active     Days of Exercise per Week: 4 days     Minutes of Exercise per Session: 40 min   Stress: No Stress Concern Present     Feeling of Stress : Not at all   Social Connections: Moderately Isolated     Frequency of Communication with Friends and Family: Once a week     Frequency of  Social Gatherings with Friends and Family: Never     Attends Orthodoxy Services: More than 4 times per year     Active Member of Clubs or Organizations: No     Attends Club or Organization Meetings: Not on file     Marital Status:    Intimate Partner Violence: Not on file   Housing Stability: Low Risk      Unable to Pay for Housing in the Last Year: No     Number of Places Lived in the Last Year: 2     Unstable Housing in the Last Year: No        FAMILY HISTORY:   Family History   Problem Relation Age of Onset     Arthritis Mother         SLE     Connective Tissue Disorder Mother         lupus     Diabetes Mother      Cerebrovascular Disease Mother      Cancer Mother      Diabetes Father      Diabetes Maternal Grandfather      Diabetes Sister         EXAM:Vitals: /68   BMI= There is no height or weight on file to calculate BMI.      General appearance: Patient is alert and fully cooperative with history & exam.  No sign of distress is noted during the visit.      Respiratory: Breathing is regular & unlabored while sitting.      HEENT: Hearing is intact to spoken word.  Speech is clear.  No gross evidence of visual impairment that would impact ambulation.      Dermatologic: Right foot incision site evaluated, well coapted and without cellulitis to site. Nontender. No edema. No clinical signs of infection. Sutures removed   Left foot submet one and submet three preulcerative lesions. Debrided.      Vascular: Dorsalis pedis and posterior tibial pulses are intact & regular bilaterally.  CFT and skin temperature is normal to both lower extremities.       Neurologic: Lower extremity sensation is diminished, bilateral foot, to light touch.  No evidence of neurological-based weakness or contracture in the lower extremities.       Musculoskeletal: Patient is ambulatory without an assistive device or brace.  S/p right first ray resection    Psychiatric: Affect is pleasant & appropriate.    Foot, Right; Bone  Resection          0 Result Notes    Culture 1+ Staphylococcus aureus Abnormal            Resulting Agency: [facility]       Susceptibility     Staphylococcus aureus     JEFFREY     Clindamycin <=0.25 ug/mL Susceptible 1     Erythromycin >=8 ug/mL Resistant     Gentamicin <=0.5 ug/mL Susceptible     Oxacillin <=0.25 ug/mL Susceptible 2     Tetracycline <=1 ug/mL Susceptible     Trimethoprim/Sulfamethoxazole <=0.5/9.5 u... Susceptible     Vancomycin 1 ug/mL Susceptible               Final Diagnosis   A.  Bone, right foot first metatarsal proximal margin: Biopsy:  - Bone with partial necrosis and acute inflammation, consistent with acute osteomyelitis     PROCEDURE:   Verbal consent was obtained for debridement. A 15 blade was used to excise the nonivable tissue to the ulcer on the left foot, down to and including subcutaneous tissue. No noted purulence afterwards. Debrided site measures 1cm x .5 x .1 cm. No probe to bone. Well tolerated. Site was cleansed with alcohol.       ASSESSMENT:  1. S/p right first ray resection on 7/28, with residual osteomyelitis --> healing   2. Left foot submet three ulcer       MEDICAL DECISION MAKING:   -Discussed all findings with patient. Incision site healing appropriately, which is encouraging given patient's known osteomyelitis. CRP and labs reviewed which are also decreasing. Has plans to have a new sed rate drawn. ID plans to extend abx and treat for a full 6 weeks.   -Has continued follow up with infectious disease.   -Has started HBO and has a continued treatment plan --> has two weeks left   -Transition from CAM boot to surgical shoe. Order placed to have new CMOs made. Discussed importance of these. Cont ACE bandage to RLE. Bandaid to left.   -F/u in 2 weeks. Continued close monitoring for incision site breakdown or drainage.          Tiny Soto DPM     [place] [facility] Pod[facility]/Foot & Ankle Surgery                  Again, thank you for allowing me to participate in the  care of your patient.        Sincerely,        Tiny Soto DPM

## 2022-08-25 ENCOUNTER — HOME INFUSION (PRE-WILLOW HOME INFUSION) (OUTPATIENT)
Dept: PHARMACY | Facility: CLINIC | Age: 62
End: 2022-08-25

## 2022-08-25 ENCOUNTER — LAB REQUISITION (OUTPATIENT)
Dept: LAB | Facility: CLINIC | Age: 62
End: 2022-08-25
Payer: COMMERCIAL

## 2022-08-25 DIAGNOSIS — R78.81 BACTEREMIA: ICD-10-CM

## 2022-08-25 LAB
AST SERPL W P-5'-P-CCNC: 38 U/L (ref 10–50)
BASOPHILS # BLD AUTO: 0 10E3/UL (ref 0–0.2)
BASOPHILS NFR BLD AUTO: 1 %
BUN SERPL-MCNC: 29.9 MG/DL (ref 8–23)
CREAT SERPL-MCNC: 0.89 MG/DL (ref 0.67–1.17)
CRP SERPL-MCNC: <3 MG/L
EOSINOPHIL # BLD AUTO: 0.2 10E3/UL (ref 0–0.7)
EOSINOPHIL NFR BLD AUTO: 5 %
ERYTHROCYTE [DISTWIDTH] IN BLOOD BY AUTOMATED COUNT: 14.2 % (ref 10–15)
ERYTHROCYTE [SEDIMENTATION RATE] IN BLOOD BY WESTERGREN METHOD: 10 MM/HR (ref 0–20)
GFR SERPL CREATININE-BSD FRML MDRD: >90 ML/MIN/1.73M2
HCT VFR BLD AUTO: 33.6 % (ref 40–53)
HGB BLD-MCNC: 10.5 G/DL (ref 13.3–17.7)
HOLD SPECIMEN: NORMAL
IMM GRANULOCYTES # BLD: 0 10E3/UL
IMM GRANULOCYTES NFR BLD: 1 %
LYMPHOCYTES # BLD AUTO: 1.1 10E3/UL (ref 0.8–5.3)
LYMPHOCYTES NFR BLD AUTO: 27 %
MCH RBC QN AUTO: 28.8 PG (ref 26.5–33)
MCHC RBC AUTO-ENTMCNC: 31.3 G/DL (ref 31.5–36.5)
MCV RBC AUTO: 92 FL (ref 78–100)
MONOCYTES # BLD AUTO: 0.3 10E3/UL (ref 0–1.3)
MONOCYTES NFR BLD AUTO: 8 %
NEUTROPHILS # BLD AUTO: 2.4 10E3/UL (ref 1.6–8.3)
NEUTROPHILS NFR BLD AUTO: 58 %
NRBC # BLD AUTO: 0 10E3/UL
NRBC BLD AUTO-RTO: 0 /100
PLATELET # BLD AUTO: 123 10E3/UL (ref 150–450)
RBC # BLD AUTO: 3.65 10E6/UL (ref 4.4–5.9)
WBC # BLD AUTO: 4 10E3/UL (ref 4–11)

## 2022-08-25 PROCEDURE — 86140 C-REACTIVE PROTEIN: CPT | Performed by: SPECIALIST

## 2022-08-25 PROCEDURE — 84450 TRANSFERASE (AST) (SGOT): CPT | Performed by: SPECIALIST

## 2022-08-25 PROCEDURE — 84520 ASSAY OF UREA NITROGEN: CPT | Performed by: SPECIALIST

## 2022-08-25 PROCEDURE — 85652 RBC SED RATE AUTOMATED: CPT | Performed by: SPECIALIST

## 2022-08-25 PROCEDURE — 85025 COMPLETE CBC W/AUTO DIFF WBC: CPT | Performed by: SPECIALIST

## 2022-08-25 PROCEDURE — 82565 ASSAY OF CREATININE: CPT | Performed by: SPECIALIST

## 2022-08-25 NOTE — PROGRESS NOTES
This is a recent snapshot of the patient's [facility] medical record.  For current drug dose and complete information and questions, call [phone] or In Basket pool, [facility] home infusion ([id number])  CSN Number:  [id number]

## 2022-08-29 ENCOUNTER — HOME INFUSION (PRE-WILLOW HOME INFUSION) (OUTPATIENT)
Dept: PHARMACY | Facility: CLINIC | Age: 62
End: 2022-08-29

## 2022-08-29 NOTE — PROGRESS NOTES
This is a recent snapshot of the patient's [facility] medical record.  For current drug dose and complete information and questions, call [phone] or In Basket pool, fv home infusion ([id number])  CSN Number:  [id number]

## 2022-08-30 ENCOUNTER — HOSPITAL ENCOUNTER (OUTPATIENT)
Dept: ULTRASOUND IMAGING | Facility: CLINIC | Age: 62
Discharge: HOME | End: 2022-08-30
Attending: INTERNAL MEDICINE | Admitting: INTERNAL MEDICINE
Payer: COMMERCIAL

## 2022-08-30 DIAGNOSIS — K74.60 CIRRHOSIS OF LIVER WITHOUT ASCITES, UNSPECIFIED HEPATIC CIRRHOSIS TYPE (H): ICD-10-CM

## 2022-08-30 PROCEDURE — 76705 ECHO EXAM OF ABDOMEN: CPT

## 2022-08-31 NOTE — PROGRESS NOTES
This is a recent snapshot of the patient's [facility] medical record.  For current drug dose and complete information and questions, call [phone] or In Basket pool, fv home infusion ([id number])  CSN Number:  [id number]

## 2022-09-01 ENCOUNTER — LAB REQUISITION (OUTPATIENT)
Dept: LAB | Facility: CLINIC | Age: 62
End: 2022-09-01
Payer: COMMERCIAL

## 2022-09-01 ENCOUNTER — HOME INFUSION (PRE-WILLOW HOME INFUSION) (OUTPATIENT)
Dept: PHARMACY | Facility: CLINIC | Age: 62
End: 2022-09-01

## 2022-09-01 DIAGNOSIS — R78.81 BACTEREMIA: ICD-10-CM

## 2022-09-01 LAB
AST SERPL W P-5'-P-CCNC: 34 U/L (ref 10–50)
BASOPHILS # BLD AUTO: 0 10E3/UL (ref 0–0.2)
BASOPHILS NFR BLD AUTO: 1 %
BUN SERPL-MCNC: 28.4 MG/DL (ref 8–23)
CREAT SERPL-MCNC: 1.15 MG/DL (ref 0.67–1.17)
CRP SERPL-MCNC: 3.7 MG/L
EOSINOPHIL # BLD AUTO: 0.3 10E3/UL (ref 0–0.7)
EOSINOPHIL NFR BLD AUTO: 5 %
ERYTHROCYTE [DISTWIDTH] IN BLOOD BY AUTOMATED COUNT: 14.6 % (ref 10–15)
GFR SERPL CREATININE-BSD FRML MDRD: 73 ML/MIN/1.73M2
HCT VFR BLD AUTO: 34.7 % (ref 40–53)
HGB BLD-MCNC: 10.5 G/DL (ref 13.3–17.7)
HOLD SPECIMEN: NORMAL
IMM GRANULOCYTES # BLD: 0 10E3/UL
IMM GRANULOCYTES NFR BLD: 0 %
LYMPHOCYTES # BLD AUTO: 1.7 10E3/UL (ref 0.8–5.3)
LYMPHOCYTES NFR BLD AUTO: 35 %
MCH RBC QN AUTO: 27.8 PG (ref 26.5–33)
MCHC RBC AUTO-ENTMCNC: 30.3 G/DL (ref 31.5–36.5)
MCV RBC AUTO: 92 FL (ref 78–100)
MONOCYTES # BLD AUTO: 0.5 10E3/UL (ref 0–1.3)
MONOCYTES NFR BLD AUTO: 10 %
NEUTROPHILS # BLD AUTO: 2.3 10E3/UL (ref 1.6–8.3)
NEUTROPHILS NFR BLD AUTO: 49 %
NRBC # BLD AUTO: 0 10E3/UL
NRBC BLD AUTO-RTO: 0 /100
PLATELET # BLD AUTO: 129 10E3/UL (ref 150–450)
RBC # BLD AUTO: 3.78 10E6/UL (ref 4.4–5.9)
WBC # BLD AUTO: 4.7 10E3/UL (ref 4–11)

## 2022-09-01 PROCEDURE — 84520 ASSAY OF UREA NITROGEN: CPT | Performed by: SPECIALIST

## 2022-09-01 PROCEDURE — 82565 ASSAY OF CREATININE: CPT | Performed by: SPECIALIST

## 2022-09-01 PROCEDURE — 85025 COMPLETE CBC W/AUTO DIFF WBC: CPT | Performed by: SPECIALIST

## 2022-09-01 PROCEDURE — 86140 C-REACTIVE PROTEIN: CPT | Performed by: SPECIALIST

## 2022-09-01 PROCEDURE — 84450 TRANSFERASE (AST) (SGOT): CPT | Performed by: SPECIALIST

## 2022-09-02 ENCOUNTER — TRANSFERRED RECORDS (OUTPATIENT)
Dept: SURGERY | Facility: CLINIC | Age: 62
End: 2022-09-02

## 2022-09-02 ENCOUNTER — TRANSFERRED RECORDS (OUTPATIENT)
Dept: HEALTH INFORMATION MANAGEMENT | Facility: CLINIC | Age: 62
End: 2022-09-02

## 2022-09-06 ENCOUNTER — HOME INFUSION (PRE-WILLOW HOME INFUSION) (OUTPATIENT)
Dept: PHARMACY | Facility: CLINIC | Age: 62
End: 2022-09-06

## 2022-09-07 ENCOUNTER — OFFICE VISIT (OUTPATIENT)
Dept: PODIATRY | Facility: CLINIC | Age: 62
End: 2022-09-07
Payer: COMMERCIAL

## 2022-09-07 VITALS — DIASTOLIC BLOOD PRESSURE: 62 MMHG | BODY MASS INDEX: 31.24 KG/M2 | SYSTOLIC BLOOD PRESSURE: 118 MMHG | WEIGHT: 224 LBS

## 2022-09-07 DIAGNOSIS — Z89.411 STATUS POST AMPUTATION OF RIGHT GREAT TOE (H): Primary | ICD-10-CM

## 2022-09-07 DIAGNOSIS — M86.171 OTHER ACUTE OSTEOMYELITIS OF RIGHT FOOT (H): ICD-10-CM

## 2022-09-07 DIAGNOSIS — L97.521 ULCER OF LEFT FOOT, LIMITED TO BREAKDOWN OF SKIN (H): ICD-10-CM

## 2022-09-07 PROCEDURE — 99214 OFFICE O/P EST MOD 30 MIN: CPT | Mod: 25 | Performed by: PODIATRIST

## 2022-09-07 PROCEDURE — 11042 DBRDMT SUBQ TIS 1ST 20SQCM/<: CPT | Mod: 79 | Performed by: PODIATRIST

## 2022-09-07 NOTE — LETTER
9/7/2022         RE: Crispin Rojas  [address] [facility] [address]        Dear Colleague,    Thank you for referring your patient, Crispin Rojas, to the [facility]. Please see a copy of my visit note below.    [facility]/FOOT & ANKLE SURGERY  CLINIC NOTE    CHIEF COMPLAINT:  right foot    PATIENT HISTORY:  Crispin Rojas is a 62 year old male  who presents for follow up for right foot. He's been going to [facility]. States right foot has totally healed. No issues to site. States new concern is for left foot where he now has a draining callus. Denies pain to site. Denies N/F/V/C/D. Presents in regular shoes today.     Previously:He was seen in the hospital for a right foot infection and had osteomyelitis to the entire first metatarsal. Long discussions were had in the hospital and patient did not want to have the entire metatarsal removed due to being relatively active. He was discharged with IV abx and started hyperbaric oxygen treatment to treat osteomyelitis.   -Since being discharged, he's been doing well. He's been changing the dressing and having essentially no drainage. Denies pain to site. Denies N/F/V/C/D          Review of Systems:  A 10 point review of systems was performed and is positive for that noted above in the patient history.  All other areas are negative.     PAST MEDICAL HISTORY:   Past Medical History:   Diagnosis Date     Cerebral infarction (H)     2010     Chronic infection      H/O blood clots 2022     Hyperlipidemia LDL goal <70      Hypertension      Numbness and tingling      Obesity      GILA (obstructive sleep apnea) 10/22/2018    CPAP intolerant     PVD (peripheral vascular disease) (H)     s/p left partial toe amputation     Renal stone      Tremor      Type 2 diabetes mellitus with diabetic polyneuropathy, with long-term current use of insulin (H)         PAST SURGICAL HISTORY:   Past Surgical History:   Procedure Laterality Date      AMPUTATE FOOT Left 8/18/2016    Procedure: AMPUTATE FOOT;  Surgeon: Jack Younger DPM;  [facility]: [facility]     AMPUTATE TOE(S) Right 2/1/2016    Procedure: AMPUTATE TOE(S);  Surgeon: Rachelle Manriquez DPM, Pod;  Location: [facility]     AMPUTATE TOE(S) Right 8/2/2019    Procedure: Right fourth toe partial amputation for treatment of osteomyelitis.;  Surgeon: Jack Younger DPM;  [facility]     AMPUTATE TOE(S) Left 11/8/2019    Procedure: Left second toe amputation at the metatarsophalangeal joint.;  Surgeon: Rachelle Manriquez DPM, Podiatry/Foot and Ankle Surgery;  Location: [facility]     AMPUTATE TOE(S) Right 6/5/2022    Procedure: AMPUTATION OF RIGHT GREAT TOE;  Surgeon: Wili Quiles DPM;  Location[facility]     AMPUTATE TOE(S) Right 7/28/2022    Procedure: Right foot partial first metatarsal resection;  Surgeon: Tiny Soto DPM;  Location: [facility]     ANGIOGRAM       BIOPSY BONE FOOT Right 7/24/2022    Procedure: Bone biopsy, right first metatarsal.;  Surgeon: Valentino Molina DPM;  Location: [facility]     COLONOSCOPY       COMBINED CYSTOSCOPY, RETROGRADES, URETEROSCOPY, INSERT STENT Left 8/21/2016    Procedure: COMBINED CYSTOSCOPY, RETROGRADES, URETEROSCOPY, INSERT STENT;  Surgeon: Artemio Valenzuela MD;  [facility]     COMBINED CYSTOSCOPY, RETROGRADES, URETEROSCOPY, LASER HOLMIUM LITHOTRIPSY URETER(S), INSERT STENT Left 10/3/2016    Procedure: COMBINED CYSTOSCOPY, RETROGRADES, URETEROSCOPY, LASER HOLMIUM LITHOTRIPSY URETER(S), INSERT STENT;  Surgeon: Artemio Valenzuela MD;  [facility]     EXTRACORPOREAL SHOCK WAVE LITHOTRIPSY (ESWL) Left 9/8/2016    Procedure: EXTRACORPOREAL SHOCK WAVE LITHOTRIPSY (ESWL);  Surgeon: Artemio Valenzuela MD;  Location: [facility]     INCISION AND DRAINAGE FOOT, COMBINED Right 7/24/2022    Procedure: Incision and drainage, right foot ;  Surgeon: Valentino Molina DPM;  [facility]     RECESSION GASTROCNEMIUS Right 2/1/2016    Procedure: RECESSION  GASTROCNEMIUS;  Surgeon: Rachelle Manriquez DPM, Pod;  [facility]        MEDICATIONS:  Reviewed in Epic.     ALLERGIES:    Allergies   Allergen Reactions     Bactrim [Sulfamethoxazole W/Trimethoprim] Nausea and Vomiting     Sulfa Drugs Nausea     Niacin Swelling     SIMCOR caused edema in extremities     Simvastatin Swelling     SIMCOR caused edema in extremities        SOCIAL HISTORY:   Social History     Socioeconomic History     Marital status:      Spouse name: Sydney     Number of children: 2     Years of education: Not on file     Highest education level: Master's degree (e.g., MA, MS, Arleth, MEd, MSW, ELIANA)   Occupational History     Occupation: marketing     Employer: [email or web address]     Comment: [employer]   Tobacco Use     Smoking status: Never Smoker     Smokeless tobacco: Never Used   Vaping Use     Vaping Use: Never used   Substance and Sexual Activity     Alcohol use: Yes     Alcohol/week: 0.0 standard drinks     Comment: rare---red wine 3x per month     Drug use: No     Sexual activity: Not Currently     Partners: Female   Other Topics Concern     Parent/sibling w/ CABG, MI or angioplasty before 65F 55M? No   Social History Narrative     Not on file     Social Determinants of Health     Financial Resource Strain: Low Risk      Difficulty of Paying Living Expenses: Not very hard   Food Insecurity: No Food Insecurity     Worried About Running Out of Food in the Last Year: Never true     Ran Out of Food in the Last Year: Never true   Transportation Needs: No Transportation Needs     Lack of Transportation (Medical): No     Lack of Transportation (Non-Medical): No   Physical Activity: Sufficiently Active     Days of Exercise per Week: 4 days     Minutes of Exercise per Session: 40 min   Stress: No Stress Concern Present     Feeling of Stress : Not at all   Social Connections: Moderately Isolated     Frequency of Communication with Friends and Family: Once a week     Frequency of Social Gatherings with  Friends and Family: Never     Attends Worship Services: More than 4 times per year     Active Member of Clubs or Organizations: No     Attends Club or Organization Meetings: Not on file     Marital Status:    Intimate Partner Violence: Not on file   Housing Stability: Low Risk      Unable to Pay for Housing in the Last Year: No     Number of Places Lived in the Last Year: 2     Unstable Housing in the Last Year: No        FAMILY HISTORY:   Family History   Problem Relation Age of Onset     Arthritis Mother         SLE     Connective Tissue Disorder Mother         lupus     Diabetes Mother      Cerebrovascular Disease Mother      Cancer Mother      Diabetes Father      Diabetes Maternal Grandfather      Diabetes Sister         EXAM:Vitals: /62   Wt 101.6 kg (224 lb)   BMI 31.24 kg/m    BMI= Body mass index is 31.24 kg/m .      General appearance: Patient is alert and fully cooperative with history & exam.  No sign of distress is noted during the visit.      Respiratory: Breathing is regular & unlabored while sitting.      HEENT: Hearing is intact to spoken word.  Speech is clear.  No gross evidence of visual impairment that would impact ambulation.      Dermatologic: Right foot incision site: well healed and epithelialized. No dehiscene. No signs of infection.   -Submet 2 right foot callus, no open lesions. No cellulitis.   Left foot submet one and submet 2 preulcerative lesions. Debrided. Submet 2 site superficial, but of more concern than submet one site. No cellulitis or probe to bone.      Vascular: Dorsalis pedis and posterior tibial pulses are intact & regular bilaterally.  CFT and skin temperature is normal to both lower extremities.       Neurologic: Lower extremity sensation is diminished, bilateral foot, to light touch.  No evidence of neurological-based weakness or contracture in the lower extremities.       Musculoskeletal: Patient is ambulatory without an assistive device or brace.  S/p  right first ray resection  S/p left hallux and second digit amputations     Psychiatric: Affect is pleasant & appropriate.    Foot, Right; Bone Resection          0 Result Notes    Culture 1+ Staphylococcus aureus Abnormal            Resulting Agency: [facility]       Susceptibility     Staphylococcus aureus     JEFFREY     Clindamycin <=0.25 ug/mL Susceptible 1     Erythromycin >=8 ug/mL Resistant     Gentamicin <=0.5 ug/mL Susceptible     Oxacillin <=0.25 ug/mL Susceptible 2     Tetracycline <=1 ug/mL Susceptible     Trimethoprim/Sulfamethoxazole <=0.5/9.5 u... Susceptible     Vancomycin 1 ug/mL Susceptible               Final Diagnosis   A.  Bone, right foot first metatarsal proximal margin: Biopsy:  - Bone with partial necrosis and acute inflammation, consistent with acute osteomyelitis     PROCEDURE:   Verbal consent was obtained for debridement. A 15 blade was used to excise the nonivable tissue to the ulcer on the left foot, down to and including subcutaneous tissue. No noted purulence afterwards. Debrided site measures 1cm x .5 x .1 cm. No probe to bone. Well tolerated. Site was cleansed with alcohol.       ASSESSMENT:  1. S/p right first ray resection on 7/28, with residual osteomyelitis --> now healed   2. Left foot submet two ulcer  --> preulcerative.      MEDICAL DECISION MAKING:   -Right foot now totally healed. Has continued abx and HBO. Encouraged completion of this. Does also have several areas of concern such as submet 2 site and distal 2nd digit     -Reviewed labs which show a lot of improvement and indicate resolution of osteomyelitis     -Left foot with preulcerative lesions. Submet 1 and 2. Submet 2 site debrided. Superifical. But really encouraged boot to this foot to offload and get to heal up. Has had CMO made [place]es     -Cont to bandage left foot and offload as much as able. He is somewhat eager to start golfing and get back to work again. Encouraged minimal pressure to site as it's not that deep  and would heal easier now than if it gets worse.     -Also discussed possible procedures such as metatarsal osteotomies and flexor tenotomy to right foot 2nd digit if needed.     -F/u in 3 weeks. Discussed waiting until going back to week until then. If patient needs to go back sooner, recommend boot to left foot.       Tiny Soto DPM   [facility] of Podiatry/Foot & Ankle Surgery    Greater than 30 minutes were spent today, reviewing previous hospital records, counseling and educating the patient on the ongoing treatment plan and coordinating care.                 Again, thank you for allowing me to participate in the care of your patient.        Sincerely,        Tiny Soto DPM

## 2022-09-07 NOTE — PROGRESS NOTES
[facility] [facility]/FOOT & ANKLE SURGERY  CLINIC NOTE    CHIEF COMPLAINT:  right foot    PATIENT HISTORY:  Crispin Rojas is a 62 year old male  who presents for follow up for right foot. He's been going to [facility]. States right foot has totally healed. No issues to site. States new concern is for left foot where he now has a draining callus. Denies pain to site. Denies N/F/V/C/D. Presents in regular shoes today.     Previously:He was seen in the hospital for a right foot infection and had osteomyelitis to the entire first metatarsal. Long discussions were had in the hospital and patient did not want to have the entire metatarsal removed due to being relatively active. He was discharged with IV abx and started hyperbaric oxygen treatment to treat osteomyelitis.   -Since being discharged, he's been doing well. He's been changing the dressing and having essentially no drainage. Denies pain to site. Denies N/F/V/C/D          Review of Systems:  A 10 point review of systems was performed and is positive for that noted above in the patient history.  All other areas are negative.     PAST MEDICAL HISTORY:   Past Medical History:   Diagnosis Date     Cerebral infarction (H)     2010     Chronic infection      H/O blood clots 2022     Hyperlipidemia LDL goal <70      Hypertension      Numbness and tingling      Obesity      GILA (obstructive sleep apnea) 10/22/2018    CPAP intolerant     PVD (peripheral vascular disease) (H)     s/p left partial toe amputation     Renal stone      Tremor      Type 2 diabetes mellitus with diabetic polyneuropathy, with long-term current use of insulin (H)         PAST SURGICAL HISTORY:   Past Surgical History:   Procedure Laterality Date     AMPUTATE FOOT Left 8/18/2016    Procedure: AMPUTATE FOOT;  Surgeon: Jack Younger DPM;  [facility]     AMPUTATE TOE(S) Right 2/1/2016    Procedure: AMPUTATE TOE(S);  Surgeon: Rachelle Manriquez DPM, Pod;  [facility]     AMPUTATE TOE(S) Right  8/2/2019    Procedure: Right fourth toe partial amputation for treatment of osteomyelitis.;  Surgeon: Jack Younger DPM;  [facility]: [facility]     AMPUTATE TOE(S) Left 11/8/2019    Procedure: Left second toe amputation at the metatarsophalangeal joint.;  Surgeon: Rachelle Manriquez DPM, Podiatry/Foot and Ankle Surgery;  Location: [facility]     AMPUTATE TOE(S) Right 6/5/2022    Procedure: AMPUTATION OF RIGHT GREAT TOE;  Surgeon: Wili Quiles DPM;  Location: [facility]     AMPUTATE TOE(S) Right 7/28/2022    Procedure: Right foot partial first metatarsal resection;  Surgeon: Tiny Soto DPM;  Location: [facility]     ANGIOGRAM       BIOPSY BONE FOOT Right 7/24/2022    Procedure: Bone biopsy, right first metatarsal.;  Surgeon: Valentino Molina DPM;  Location: [facility]     COLONOSCOPY       COMBINED CYSTOSCOPY, RETROGRADES, URETEROSCOPY, INSERT STENT Left 8/21/2016    Procedure: COMBINED CYSTOSCOPY, RETROGRADES, URETEROSCOPY, INSERT STENT;  Surgeon: Artemio Valenzuela MD;  [facility]     COMBINED CYSTOSCOPY, RETROGRADES, URETEROSCOPY, LASER HOLMIUM LITHOTRIPSY URETER(S), INSERT STENT Left 10/3/2016    Procedure: COMBINED CYSTOSCOPY, RETROGRADES, URETEROSCOPY, LASER HOLMIUM LITHOTRIPSY URETER(S), INSERT STENT;  Surgeon: Artemio Valenzuela MD;  [facility]     EXTRACORPOREAL SHOCK WAVE LITHOTRIPSY (ESWL) Left 9/8/2016    Procedure: EXTRACORPOREAL SHOCK WAVE LITHOTRIPSY (ESWL);  Surgeon: Artemio Valenzuela MD;  Location: [facility]     INCISION AND DRAINAGE FOOT, COMBINED Right 7/24/2022    Procedure: Incision and drainage, right foot ;  Surgeon: Valentino Molina DPM;  [facility]: [facility]     RECESSION GASTROCNEMIUS Right 2/1/2016    Procedure: RECESSION GASTROCNEMIUS;  Surgeon: Rachelle Manriquez DPM, Pod;  [facility]: [facility]        MEDICATIONS:  Reviewed in Epic.     ALLERGIES:    Allergies   Allergen Reactions     Bactrim [Sulfamethoxazole W/Trimethoprim] Nausea and Vomiting     Sulfa Drugs Nausea     Niacin  Swelling     SIMCOR caused edema in extremities     Simvastatin Swelling     SIMCOR caused edema in extremities        SOCIAL HISTORY:   Social History     Socioeconomic History     Marital status:      Spouse name: Sydney     Number of children: 2     Years of education: Not on file     Highest education level: Master's degree (e.g., MA, MS, Arleth, MEd, MSW, ELIANA)   Occupational History     Occupation: marketing     Employer: [employer]     Comment: [employer]   Tobacco Use     Smoking status: Never Smoker     Smokeless tobacco: Never Used   Vaping Use     Vaping Use: Never used   Substance and Sexual Activity     Alcohol use: Yes     Alcohol/week: 0.0 standard drinks     Comment: rare---red wine 3x per month     Drug use: No     Sexual activity: Not Currently     Partners: Female   Other Topics Concern     Parent/sibling w/ CABG, MI or angioplasty before 65F 55M? No   Social History Narrative     Not on file     Social Determinants of Health     Financial Resource Strain: Low Risk      Difficulty of Paying Living Expenses: Not very hard   Food Insecurity: No Food Insecurity     Worried About Running Out of Food in the Last Year: Never true     Ran Out of Food in the Last Year: Never true   Transportation Needs: No Transportation Needs     Lack of Transportation (Medical): No     Lack of Transportation (Non-Medical): No   Physical Activity: Sufficiently Active     Days of Exercise per Week: 4 days     Minutes of Exercise per Session: 40 min   Stress: No Stress Concern Present     Feeling of Stress : Not at all   Social Connections: Moderately Isolated     Frequency of Communication with Friends and Family: Once a week     Frequency of Social Gatherings with Friends and Family: Never     Attends Sabianist Services: More than 4 times per year     Active Member of Clubs or Organizations: No     Attends Club or Organization Meetings: Not on file     Marital Status:    Intimate Partner Violence: Not on file    Housing Stability: Low Risk      Unable to Pay for Housing in the Last Year: No     Number of Places Lived in the Last Year: 2     Unstable Housing in the Last Year: No        FAMILY HISTORY:   Family History   Problem Relation Age of Onset     Arthritis Mother         SLE     Connective Tissue Disorder Mother         lupus     Diabetes Mother      Cerebrovascular Disease Mother      Cancer Mother      Diabetes Father      Diabetes Maternal Grandfather      Diabetes Sister         EXAM:Vitals: /62   Wt 101.6 kg (224 lb)   BMI 31.24 kg/m    BMI= Body mass index is 31.24 kg/m .      General appearance: Patient is alert and fully cooperative with history & exam.  No sign of distress is noted during the visit.      Respiratory: Breathing is regular & unlabored while sitting.      HEENT: Hearing is intact to spoken word.  Speech is clear.  No gross evidence of visual impairment that would impact ambulation.      Dermatologic: Right foot incision site: well healed and epithelialized. No dehiscene. No signs of infection.   -Submet 2 right foot callus, no open lesions. No cellulitis.   Left foot submet one and submet 2 preulcerative lesions. Debrided. Submet 2 site superficial, but of more concern than submet one site. No cellulitis or probe to bone.      Vascular: Dorsalis pedis and posterior tibial pulses are intact & regular bilaterally.  CFT and skin temperature is normal to both lower extremities.       Neurologic: Lower extremity sensation is diminished, bilateral foot, to light touch.  No evidence of neurological-based weakness or contracture in the lower extremities.       Musculoskeletal: Patient is ambulatory without an assistive device or brace.  S/p right first ray resection  S/p left hallux and second digit amputations     Psychiatric: Affect is pleasant & appropriate.    Foot, Right; Bone Resection          0 Result Notes    Culture 1+ Staphylococcus aureus Abnormal            Resulting Agency: [facility]        Susceptibility     Staphylococcus aureus     JEFFREY     Clindamycin <=0.25 ug/mL Susceptible 1     Erythromycin >=8 ug/mL Resistant     Gentamicin <=0.5 ug/mL Susceptible     Oxacillin <=0.25 ug/mL Susceptible 2     Tetracycline <=1 ug/mL Susceptible     Trimethoprim/Sulfamethoxazole <=0.5/9.5 u... Susceptible     Vancomycin 1 ug/mL Susceptible               Final Diagnosis   A.  Bone, right foot first metatarsal proximal margin: Biopsy:  - Bone with partial necrosis and acute inflammation, consistent with acute osteomyelitis     PROCEDURE:   Verbal consent was obtained for debridement. A 15 blade was used to excise the nonivable tissue to the ulcer on the left foot, down to and including subcutaneous tissue. No noted purulence afterwards. Debrided site measures 1cm x .5 x .1 cm. No probe to bone. Well tolerated. Site was cleansed with alcohol.       ASSESSMENT:  1. S/p right first ray resection on 7/28, with residual osteomyelitis --> now healed   2. Left foot submet two ulcer  --> preulcerative.      MEDICAL DECISION MAKING:   -Right foot now totally healed. Has continued abx and HBO. Encouraged completion of this. Does also have several areas of concern such as submet 2 site and distal 2nd digit     -Reviewed labs which show a lot of improvement and indicate resolution of osteomyelitis     -Left foot with preulcerative lesions. Submet 1 and 2. Submet 2 site debrided. Superifical. But really encouraged boot to this foot to offload and get to heal up. Has had CMO made [place]     -Cont to bandage left foot and offload as much as able. He is somewhat eager to start golfing and get back to work again. Encouraged minimal pressure to site as it's not that deep and would heal easier now than if it gets worse.     -Also discussed possible procedures such as metatarsal osteotomies and flexor tenotomy to right foot 2nd digit if needed.     -F/u in 3 weeks. Discussed waiting until going back to week until then. If  patient needs to go back sooner, recommend boot to left foot.       Tiny Soto DPM   [place] Department of Podiatry/Foot & Ankle Surgery    Greater than 30 minutes were spent today, reviewing previous hospital records, counseling and educating the patient on the ongoing treatment plan and coordinating care.

## 2022-09-07 NOTE — PROGRESS NOTES
This is a recent snapshot of the patient's [facility] medical record.  For current drug dose and complete information and questions, call [phone] or In Basket pool, [facility]v home infusion ([id number])  CSN Number:  [id number]

## 2022-09-08 ENCOUNTER — LAB REQUISITION (OUTPATIENT)
Dept: LAB | Facility: CLINIC | Age: 62
End: 2022-09-08
Payer: COMMERCIAL

## 2022-09-08 ENCOUNTER — HOME INFUSION (PRE-WILLOW HOME INFUSION) (OUTPATIENT)
Dept: PHARMACY | Facility: CLINIC | Age: 62
End: 2022-09-08

## 2022-09-08 DIAGNOSIS — R78.81 BACTEREMIA: ICD-10-CM

## 2022-09-08 LAB
AST SERPL W P-5'-P-CCNC: 32 U/L (ref 10–50)
BASOPHILS # BLD AUTO: 0 10E3/UL (ref 0–0.2)
BASOPHILS NFR BLD AUTO: 1 %
BUN SERPL-MCNC: 34.2 MG/DL (ref 8–23)
CREAT SERPL-MCNC: 0.88 MG/DL (ref 0.67–1.17)
CRP SERPL-MCNC: <3 MG/L
EOSINOPHIL # BLD AUTO: 0.2 10E3/UL (ref 0–0.7)
EOSINOPHIL NFR BLD AUTO: 4 %
ERYTHROCYTE [DISTWIDTH] IN BLOOD BY AUTOMATED COUNT: 14.1 % (ref 10–15)
ERYTHROCYTE [SEDIMENTATION RATE] IN BLOOD BY WESTERGREN METHOD: 24 MM/HR (ref 0–20)
GFR SERPL CREATININE-BSD FRML MDRD: >90 ML/MIN/1.73M2
HCT VFR BLD AUTO: 36.9 % (ref 40–53)
HGB BLD-MCNC: 11.5 G/DL (ref 13.3–17.7)
HOLD SPECIMEN: NORMAL
IMM GRANULOCYTES # BLD: 0 10E3/UL
IMM GRANULOCYTES NFR BLD: 0 %
LYMPHOCYTES # BLD AUTO: 1.4 10E3/UL (ref 0.8–5.3)
LYMPHOCYTES NFR BLD AUTO: 32 %
MCH RBC QN AUTO: 28 PG (ref 26.5–33)
MCHC RBC AUTO-ENTMCNC: 31.2 G/DL (ref 31.5–36.5)
MCV RBC AUTO: 90 FL (ref 78–100)
MONOCYTES # BLD AUTO: 0.4 10E3/UL (ref 0–1.3)
MONOCYTES NFR BLD AUTO: 9 %
NEUTROPHILS # BLD AUTO: 2.4 10E3/UL (ref 1.6–8.3)
NEUTROPHILS NFR BLD AUTO: 54 %
NRBC # BLD AUTO: 0 10E3/UL
NRBC BLD AUTO-RTO: 0 /100
PLATELET # BLD AUTO: 170 10E3/UL (ref 150–450)
RBC # BLD AUTO: 4.1 10E6/UL (ref 4.4–5.9)
WBC # BLD AUTO: 4.5 10E3/UL (ref 4–11)

## 2022-09-08 PROCEDURE — 84450 TRANSFERASE (AST) (SGOT): CPT | Performed by: SPECIALIST

## 2022-09-08 PROCEDURE — 85025 COMPLETE CBC W/AUTO DIFF WBC: CPT | Performed by: SPECIALIST

## 2022-09-08 PROCEDURE — 85652 RBC SED RATE AUTOMATED: CPT | Performed by: SPECIALIST

## 2022-09-08 PROCEDURE — 82565 ASSAY OF CREATININE: CPT | Performed by: SPECIALIST

## 2022-09-08 PROCEDURE — 84520 ASSAY OF UREA NITROGEN: CPT | Performed by: SPECIALIST

## 2022-09-08 PROCEDURE — 86140 C-REACTIVE PROTEIN: CPT | Performed by: SPECIALIST

## 2022-09-09 ENCOUNTER — HOME INFUSION (PRE-WILLOW HOME INFUSION) (OUTPATIENT)
Dept: PHARMACY | Facility: CLINIC | Age: 62
End: 2022-09-09

## 2022-09-12 ENCOUNTER — HOME INFUSION (PRE-WILLOW HOME INFUSION) (OUTPATIENT)
Dept: PHARMACY | Facility: CLINIC | Age: 62
End: 2022-09-12

## 2022-09-12 ENCOUNTER — LAB REQUISITION (OUTPATIENT)
Dept: LAB | Facility: CLINIC | Age: 62
End: 2022-09-12
Payer: COMMERCIAL

## 2022-09-12 DIAGNOSIS — R78.81 BACTEREMIA: ICD-10-CM

## 2022-09-12 LAB
AFP SERPL-MCNC: 4.1 NG/ML
ALBUMIN SERPL BCG-MCNC: 4.2 G/DL (ref 3.5–5.2)
ALP SERPL-CCNC: 54 U/L (ref 40–129)
ALT SERPL W P-5'-P-CCNC: 22 U/L (ref 10–50)
ANION GAP SERPL CALCULATED.3IONS-SCNC: 11 MMOL/L (ref 7–15)
AST SERPL W P-5'-P-CCNC: 34 U/L (ref 10–50)
BASOPHILS # BLD AUTO: 0 10E3/UL (ref 0–0.2)
BASOPHILS NFR BLD AUTO: 1 %
BILIRUB SERPL-MCNC: 0.3 MG/DL
BUN SERPL-MCNC: 28.3 MG/DL (ref 8–23)
CALCIUM SERPL-MCNC: 9.4 MG/DL (ref 8.8–10.2)
CHLORIDE SERPL-SCNC: 104 MMOL/L (ref 98–107)
CREAT SERPL-MCNC: 0.8 MG/DL (ref 0.67–1.17)
DEPRECATED HCO3 PLAS-SCNC: 24 MMOL/L (ref 22–29)
EOSINOPHIL # BLD AUTO: 0.2 10E3/UL (ref 0–0.7)
EOSINOPHIL NFR BLD AUTO: 4 %
ERYTHROCYTE [DISTWIDTH] IN BLOOD BY AUTOMATED COUNT: 14.2 % (ref 10–15)
FERRITIN SERPL-MCNC: 33 NG/ML (ref 31–409)
GFR SERPL CREATININE-BSD FRML MDRD: >90 ML/MIN/1.73M2
GLUCOSE SERPL-MCNC: 159 MG/DL (ref 70–99)
HCT VFR BLD AUTO: 34.7 % (ref 40–53)
HGB BLD-MCNC: 10.8 G/DL (ref 13.3–17.7)
IMM GRANULOCYTES # BLD: 0 10E3/UL
IMM GRANULOCYTES NFR BLD: 0 %
IRON BINDING CAPACITY (ROCHE): 369 UG/DL (ref 240–430)
IRON SATN MFR SERPL: 24 % (ref 15–46)
IRON SERPL-MCNC: 90 UG/DL (ref 61–157)
LYMPHOCYTES # BLD AUTO: 1.2 10E3/UL (ref 0.8–5.3)
LYMPHOCYTES NFR BLD AUTO: 32 %
MCH RBC QN AUTO: 28.3 PG (ref 26.5–33)
MCHC RBC AUTO-ENTMCNC: 31.1 G/DL (ref 31.5–36.5)
MCV RBC AUTO: 91 FL (ref 78–100)
MONOCYTES # BLD AUTO: 0.3 10E3/UL (ref 0–1.3)
MONOCYTES NFR BLD AUTO: 9 %
NEUTROPHILS # BLD AUTO: 2 10E3/UL (ref 1.6–8.3)
NEUTROPHILS NFR BLD AUTO: 54 %
NRBC # BLD AUTO: 0 10E3/UL
NRBC BLD AUTO-RTO: 0 /100
PLATELET # BLD AUTO: 143 10E3/UL (ref 150–450)
POTASSIUM SERPL-SCNC: 4.1 MMOL/L (ref 3.4–5.3)
PROT SERPL-MCNC: 7.3 G/DL (ref 6.4–8.3)
RBC # BLD AUTO: 3.82 10E6/UL (ref 4.4–5.9)
SODIUM SERPL-SCNC: 139 MMOL/L (ref 136–145)
WBC # BLD AUTO: 3.7 10E3/UL (ref 4–11)

## 2022-09-12 PROCEDURE — 80053 COMPREHEN METABOLIC PANEL: CPT | Performed by: INTERNAL MEDICINE

## 2022-09-12 PROCEDURE — 86381 MITOCHONDRIAL ANTIBODY EACH: CPT | Performed by: INTERNAL MEDICINE

## 2022-09-12 PROCEDURE — 86706 HEP B SURFACE ANTIBODY: CPT | Performed by: INTERNAL MEDICINE

## 2022-09-12 PROCEDURE — 82784 ASSAY IGA/IGD/IGG/IGM EACH: CPT | Performed by: INTERNAL MEDICINE

## 2022-09-12 PROCEDURE — 82105 ALPHA-FETOPROTEIN SERUM: CPT | Performed by: INTERNAL MEDICINE

## 2022-09-12 PROCEDURE — 86015 ACTIN ANTIBODY EACH: CPT | Performed by: INTERNAL MEDICINE

## 2022-09-12 PROCEDURE — 86038 ANTINUCLEAR ANTIBODIES: CPT | Performed by: INTERNAL MEDICINE

## 2022-09-12 PROCEDURE — 86704 HEP B CORE ANTIBODY TOTAL: CPT | Performed by: INTERNAL MEDICINE

## 2022-09-12 PROCEDURE — 82103 ALPHA-1-ANTITRYPSIN TOTAL: CPT | Performed by: INTERNAL MEDICINE

## 2022-09-12 PROCEDURE — 82728 ASSAY OF FERRITIN: CPT | Performed by: INTERNAL MEDICINE

## 2022-09-12 PROCEDURE — 83550 IRON BINDING TEST: CPT | Performed by: INTERNAL MEDICINE

## 2022-09-12 PROCEDURE — 82785 ASSAY OF IGE: CPT | Performed by: INTERNAL MEDICINE

## 2022-09-12 PROCEDURE — 85025 COMPLETE CBC W/AUTO DIFF WBC: CPT | Performed by: INTERNAL MEDICINE

## 2022-09-12 PROCEDURE — 87340 HEPATITIS B SURFACE AG IA: CPT | Performed by: INTERNAL MEDICINE

## 2022-09-12 NOTE — PROGRESS NOTES
This is a recent snapshot of the patient's [facility] medical record.  For current drug dose and complete information and questions, call [phone] or In Basket pool, fv home infusion ([id number])  CSN Number:  [id number]

## 2022-09-13 LAB
ANA PAT SER IF-IMP: ABNORMAL
ANA SER QL IF: ABNORMAL
ANA TITR SER IF: ABNORMAL {TITER}
HBV CORE AB SERPL QL IA: NONREACTIVE
HBV SURFACE AB SERPL IA-ACNC: 0.09 M[IU]/ML
HBV SURFACE AB SERPL IA-ACNC: NONREACTIVE M[IU]/ML
HBV SURFACE AG SERPL QL IA: NONREACTIVE
IGA SERPL-MCNC: 278 MG/DL (ref 84–499)
IGE SERPL-ACNC: 297 KU/L (ref 0–114)
IGG SERPL-MCNC: 1208 MG/DL (ref 610–1616)
IGM SERPL-MCNC: 113 MG/DL (ref 35–242)
MITOCHONDRIA M2 IGG SER-ACNC: <1 U/ML

## 2022-09-13 NOTE — PROGRESS NOTES
This is a recent snapshot of the patient's [facility] medical record.  For current drug dose and complete information and questions, call [phone] or In Basket pool, fv home infusion ([id number])  CSN Number:  [id number]

## 2022-09-14 ENCOUNTER — LAB REQUISITION (OUTPATIENT)
Dept: LAB | Facility: CLINIC | Age: 62
End: 2022-09-14
Payer: COMMERCIAL

## 2022-09-14 ENCOUNTER — HOME INFUSION (PRE-WILLOW HOME INFUSION) (OUTPATIENT)
Dept: PHARMACY | Facility: CLINIC | Age: 62
End: 2022-09-14

## 2022-09-14 ENCOUNTER — MEDICAL CORRESPONDENCE (OUTPATIENT)
Dept: HEALTH INFORMATION MANAGEMENT | Facility: CLINIC | Age: 62
End: 2022-09-14

## 2022-09-14 DIAGNOSIS — R78.81 BACTEREMIA: ICD-10-CM

## 2022-09-14 NOTE — PROGRESS NOTES
This is a recent snapshot of the patient's [facility] medical record.  For current drug dose and complete information and questions, call [phone] or In Basket pool, fv home infusion ([id number])  CSN Number:  [id number]

## 2022-09-15 LAB
A1AT PHENOTYP SERPL-IMP: NORMAL
A1AT SERPL-MCNC: 115 MG/DL
SMA IGG SER-ACNC: 9 UNITS

## 2022-09-16 NOTE — PROGRESS NOTES
This is a recent snapshot of the patient's [facility] medical record.  For current drug dose and complete information and questions, call [phone] or In Basket pool, fv home infusion ([id number])  CSN Number:  [id number]

## 2022-09-28 ENCOUNTER — OFFICE VISIT (OUTPATIENT)
Dept: PODIATRY | Facility: CLINIC | Age: 62
End: 2022-09-28
Payer: COMMERCIAL

## 2022-09-28 VITALS
DIASTOLIC BLOOD PRESSURE: 68 MMHG | HEIGHT: 71 IN | SYSTOLIC BLOOD PRESSURE: 116 MMHG | WEIGHT: 227.2 LBS | BODY MASS INDEX: 31.81 KG/M2

## 2022-09-28 DIAGNOSIS — L98.491 CALLOUS ULCER, LIMITED TO BREAKDOWN OF SKIN (H): Primary | ICD-10-CM

## 2022-09-28 PROCEDURE — 11055 PARING/CUTG B9 HYPRKER LES 1: CPT | Mod: 58 | Performed by: PODIATRIST

## 2022-09-28 PROCEDURE — 99213 OFFICE O/P EST LOW 20 MIN: CPT | Mod: 25 | Performed by: PODIATRIST

## 2022-09-28 NOTE — LETTER
9/28/2022         RE: Crispin Rojas  [address] [facility] [address]        Dear Colleague,    Thank you for referring your patient, Crispin Rojas, to the [facility]. Please see a copy of my visit note below.    [facility]/FOOT & ANKLE SURGERY  CLINIC NOTE    CHIEF COMPLAINT:  right foot    PATIENT HISTORY:  Crispin Rojas is a 62 year old male  who presents for follow up for bilateral feet. Has now completed HBO. States right side has had no drainage. Left side has had very little. Denies pain. States he's now back to work 5 days a week. States that he golfed 18 holes this morning. States no new issues. Is still waiting for new diabetic shoes/orthotics to come in. States overall, feels much better and like his old self.     Previously:He was seen in the hospital for a right foot infection and had osteomyelitis to the entire first metatarsal. Long discussions were had in the hospital and patient did not want to have the entire metatarsal removed due to being relatively active. He was discharged with IV abx and started hyperbaric oxygen treatment to treat osteomyelitis.   -Since being discharged, he's been doing well. He's been changing the dressing and having essentially no drainage. Denies pain to site. Denies N/F/V/C/D          Review of Systems:  A 10 point review of systems was performed and is positive for that noted above in the patient history.  All other areas are negative.     PAST MEDICAL HISTORY:   Past Medical History:   Diagnosis Date     Cerebral infarction (H)     2010     Chronic infection      H/O blood clots 2022     Hyperlipidemia LDL goal <70      Hypertension      Numbness and tingling      Obesity      GILA (obstructive sleep apnea) 10/22/2018    CPAP intolerant     PVD (peripheral vascular disease) (H)     s/p left partial toe amputation     Renal stone      Tremor      Type 2 diabetes mellitus with diabetic polyneuropathy, with long-term  current use of insulin (H)         PAST SURGICAL HISTORY:   Past Surgical History:   Procedure Laterality Date     AMPUTATE FOOT Left 8/18/2016    Procedure: AMPUTATE FOOT;  Surgeon: Jack Younger DPM;  Location: [facility]     AMPUTATE TOE(S) Right 2/1/2016    Procedure: AMPUTATE TOE(S);  Surgeon: Rachelle Manriquez DPM, Pod;  Location: [facility]     AMPUTATE TOE(S) Right 8/2/2019    Procedure: Right fourth toe partial amputation for treatment of osteomyelitis.;  Surgeon: Jack Younger DPM;  [facility]     AMPUTATE TOE(S) Left 11/8/2019    Procedure: Left second toe amputation at the metatarsophalangeal joint.;  Surgeon: Rachelle Manriquez DPM, Podiatry/Foot and Ankle Surgery;  Location: [facility]     AMPUTATE TOE(S) Right 6/5/2022    Procedure: AMPUTATION OF RIGHT GREAT TOE;  Surgeon: Wili Quiles DPM;  [facility]: [facility]     AMPUTATE TOE(S) Right 7/28/2022    Procedure: Right foot partial first metatarsal resection;  Surgeon: Tiny Soto DPM;  Location: [facility]     ANGIOGRAM       BIOPSY BONE FOOT Right 7/24/2022    Procedure: Bone biopsy, right first metatarsal.;  Surgeon: Valentino Molina DPM;  Location: [facility]     COLONOSCOPY       COMBINED CYSTOSCOPY, RETROGRADES, URETEROSCOPY, INSERT STENT Left 8/21/2016    Procedure: COMBINED CYSTOSCOPY, RETROGRADES, URETEROSCOPY, INSERT STENT;  Surgeon: Artemio Valenzuela MD;  Location[facility]     COMBINED CYSTOSCOPY, RETROGRADES, URETEROSCOPY, LASER HOLMIUM LITHOTRIPSY URETER(S), INSERT STENT Left 10/3/2016    Procedure: COMBINED CYSTOSCOPY, RETROGRADES, URETEROSCOPY, LASER HOLMIUM LITHOTRIPSY URETER(S), INSERT STENT;  Surgeon: Artemio Valenzuela MD;  [facility]     EXTRACORPOREAL SHOCK WAVE LITHOTRIPSY (ESWL) Left 9/8/2016    Procedure: EXTRACORPOREAL SHOCK WAVE LITHOTRIPSY (ESWL);  Surgeon: Artemio Valenzuela MD;  Location: [facility]     INCISION AND DRAINAGE FOOT, COMBINED Right 7/24/2022    Procedure: Incision and drainage, right foot ;   Surgeon: Valentino Molina DPM;  [facility]     RECESSION GASTROCNEMIUS Right 2/1/2016    Procedure: RECESSION GASTROCNEMIUS;  Surgeon: Rachelle Manriquez DPM, Pod;  [facility]: [facility]        MEDICATIONS:  Reviewed in Epic.     ALLERGIES:    Allergies   Allergen Reactions     Bactrim [Sulfamethoxazole W/Trimethoprim] Nausea and Vomiting     Sulfa Drugs Nausea     Niacin Swelling     SIMCOR caused edema in extremities     Simvastatin Swelling     SIMCOR caused edema in extremities        SOCIAL HISTORY:   Social History     Socioeconomic History     Marital status:      Spouse name: Sydney     Number of children: 2     Years of education: Not on file     Highest education level: Master's degree (e.g., MA, MS, Arleth, MEd, MSW, ELIANA)   Occupational History     Occupation: marketing     Employer: [employer]     Comment: [employer]   Tobacco Use     Smoking status: Never Smoker     Smokeless tobacco: Never Used   Vaping Use     Vaping Use: Never used   Substance and Sexual Activity     Alcohol use: Yes     Alcohol/week: 0.0 standard drinks     Comment: rare---red wine 3x per month     Drug use: No     Sexual activity: Not Currently     Partners: Female   Other Topics Concern     Parent/sibling w/ CABG, MI or angioplasty before 65F 55M? No   Social History Narrative     Not on file     Social Determinants of Health     Financial Resource Strain: Low Risk      Difficulty of Paying Living Expenses: Not very hard   Food Insecurity: No Food Insecurity     Worried About Running Out of Food in the Last Year: Never true     Ran Out of Food in the Last Year: Never true   Transportation Needs: No Transportation Needs     Lack of Transportation (Medical): No     Lack of Transportation (Non-Medical): No   Physical Activity: Sufficiently Active     Days of Exercise per Week: 4 days     Minutes of Exercise per Session: 40 min   Stress: No Stress Concern Present     Feeling of Stress : Not at all   Social Connections: Moderately  "Isolated     Frequency of Communication with Friends and Family: Once a week     Frequency of Social Gatherings with Friends and Family: Never     Attends Zoroastrianism Services: More than 4 times per year     Active Member of Clubs or Organizations: No     Attends Club or Organization Meetings: Not on file     Marital Status:    Intimate Partner Violence: Not on file   Housing Stability: Low Risk      Unable to Pay for Housing in the Last Year: No     Number of Places Lived in the Last Year: 2     Unstable Housing in the Last Year: No        FAMILY HISTORY:   Family History   Problem Relation Age of Onset     Arthritis Mother         SLE     Connective Tissue Disorder Mother         lupus     Diabetes Mother      Cerebrovascular Disease Mother      Cancer Mother      Diabetes Father      Diabetes Maternal Grandfather      Diabetes Sister         EXAM:Vitals: /68 (BP Location: Right arm)   Ht 1.803 m (5' 11\")   Wt 103.1 kg (227 lb 3.2 oz)   BMI 31.69 kg/m    BMI= Body mass index is 31.69 kg/m .      General appearance: Patient is alert and fully cooperative with history & exam.  No sign of distress is noted during the visit.      Respiratory: Breathing is regular & unlabored while sitting.      HEENT: Hearing is intact to spoken word.  Speech is clear.  No gross evidence of visual impairment that would impact ambulation.      Dermatologic: Right foot incision site: well healed and epithelialized. No dehiscene. No signs of infection.   -Submet 2 right foot callus, no open lesions. No cellulitis. Debrided.   Left foot submet one and submet 2 preulcerative lesions. Debrided. Submet 2 site superficial, but of more concern than submet one site. No cellulitis or probe to bone.      Vascular: Dorsalis pedis and posterior tibial pulses are intact & regular bilaterally.  CFT and skin temperature is normal to both lower extremities.       Neurologic: Lower extremity sensation is diminished, bilateral foot, to " light touch.  No evidence of neurological-based weakness or contracture in the lower extremities.       Musculoskeletal: Patient is ambulatory without an assistive device or brace.  S/p right first ray resection  S/p left hallux and second digit amputations     Psychiatric: Affect is pleasant & appropriate.    Foot, Right; Bone Resection          0 Result Notes    Culture 1+ Staphylococcus aureus Abnormal            Resulting Agency: [facility]       Susceptibility     Staphylococcus aureus     JEFFREY     Clindamycin <=0.25 ug/mL Susceptible 1     Erythromycin >=8 ug/mL Resistant     Gentamicin <=0.5 ug/mL Susceptible     Oxacillin <=0.25 ug/mL Susceptible 2     Tetracycline <=1 ug/mL Susceptible     Trimethoprim/Sulfamethoxazole <=0.5/9.5 u... Susceptible     Vancomycin 1 ug/mL Susceptible               Final Diagnosis   A.  Bone, right foot first metatarsal proximal margin: Biopsy:  - Bone with partial necrosis and acute inflammation, consistent with acute osteomyelitis     PROCEDURE:   Verbal consent was obtained for debridement. A 15 blade was used to excise the nonivable tissue to the ulcer on the left foot, down to and including dermal tissue tissue. No noted purulence afterwards. Debrided site measures 1cm x .5 x .1 cm. No probe to bone. Well tolerated. Site was cleansed with alcohol.       ASSESSMENT:  1. S/p right first ray resection on 7/28, with residual osteomyelitis --> now healed   2. Left foot submet two ulcer  --> preulcerative --> remain stable.      MEDICAL DECISION MAKING:   -Sites all remain healed. Patient getting to be more and more active. Is now back to work 5 days a week and is golfing. Is hoping to start excecising again.   -Okay for patient to slowly start exercising. Is still waiting for CMOs to come in. Discussed that first goal should be for him to be able to work full time, without using the boot and without any drainage to site. But overall, would be appropriate to start exercise plan with  close and careful monitoring. He agrees to this.   -Will see back in one month. No open lesions. Several areas that are being watched. Patient to call if any questions or concerns sooner        Tiny Soto DPM   [facility] Department of Podiatry/Foot & Ankle Surgery                Again, thank you for allowing me to participate in the care of your patient.        Sincerely,        Tiny Soto DPM

## 2022-09-28 NOTE — PROGRESS NOTES
[facility]/FOOT & ANKLE SURGERY  CLINIC NOTE    CHIEF COMPLAINT:  right foot    PATIENT HISTORY:  Crispin Rojas is a 62 year old male  who presents for follow up for bilateral feet. Has now completed HBO. States right side has had no drainage. Left side has had very little. Denies pain. States he's now back to work 5 days a week. States that he golfed 18 holes this morning. States no new issues. Is still waiting for new diabetic shoes/orthotics to come in. States overall, feels much better and like his old self.     Previously:He was seen in the hospital for a right foot infection and had osteomyelitis to the entire first metatarsal. Long discussions were had in the hospital and patient did not want to have the entire metatarsal removed due to being relatively active. He was discharged with IV abx and started hyperbaric oxygen treatment to treat osteomyelitis.   -Since being discharged, he's been doing well. He's been changing the dressing and having essentially no drainage. Denies pain to site. Denies N/F/V/C/D          Review of Systems:  A 10 point review of systems was performed and is positive for that noted above in the patient history.  All other areas are negative.     PAST MEDICAL HISTORY:   Past Medical History:   Diagnosis Date     Cerebral infarction (H)     2010     Chronic infection      H/O blood clots 2022     Hyperlipidemia LDL goal <70      Hypertension      Numbness and tingling      Obesity      GILA (obstructive sleep apnea) 10/22/2018    CPAP intolerant     PVD (peripheral vascular disease) (H)     s/p left partial toe amputation     Renal stone      Tremor      Type 2 diabetes mellitus with diabetic polyneuropathy, with long-term current use of insulin (H)         PAST SURGICAL HISTORY:   Past Surgical History:   Procedure Laterality Date     AMPUTATE FOOT Left 8/18/2016    Procedure: AMPUTATE FOOT;  Surgeon: Jack Younger DPM;  [facility]     AMPUTATE TOE(S) Right  2/1/2016    Procedure: AMPUTATE TOE(S);  Surgeon: Rachelle Manriquez DPM, Pod;  [facility]: [facility]     AMPUTATE TOE(S) Right 8/2/2019    Procedure: Right fourth toe partial amputation for treatment of osteomyelitis.;  Surgeon: Jack Younger DPM;  [facility]: [facility]     AMPUTATE TOE(S) Left 11/8/2019    Procedure: Left second toe amputation at the metatarsophalangeal joint.;  Surgeon: Rachelle Manriquez DPM, Podiatry/Foot and Ankle Surgery;  Location: [facility]     AMPUTATE TOE(S) Right 6/5/2022    Procedure: AMPUTATION OF RIGHT GREAT TOE;  Surgeon: Wili Quiles DPM;  [facility]: [facility]     AMPUTATE TOE(S) Right 7/28/2022    Procedure: Right foot partial first metatarsal resection;  Surgeon: Tiny Soto DPM;  Location: [facility]     ANGIOGRAM       BIOPSY BONE FOOT Right 7/24/2022    Procedure: Bone biopsy, right first metatarsal.;  Surgeon: Valentino Molina DPM;  Location: [facility]     COLONOSCOPY       COMBINED CYSTOSCOPY, RETROGRADES, URETEROSCOPY, INSERT STENT Left 8/21/2016    Procedure: COMBINED CYSTOSCOPY, RETROGRADES, URETEROSCOPY, INSERT STENT;  Surgeon: Artemio aVlenzuela MD;  Location: [facility]     COMBINED CYSTOSCOPY, RETROGRADES, URETEROSCOPY, LASER HOLMIUM LITHOTRIPSY URETER(S), INSERT STENT Left 10/3/2016    Procedure: COMBINED CYSTOSCOPY, RETROGRADES, URETEROSCOPY, LASER HOLMIUM LITHOTRIPSY URETER(S), INSERT STENT;  Surgeon: Artemio Valenzuela MD;  [facility]     EXTRACORPOREAL SHOCK WAVE LITHOTRIPSY (ESWL) Left 9/8/2016    Procedure: EXTRACORPOREAL SHOCK WAVE LITHOTRIPSY (ESWL);  Surgeon: Artemio Valenzuela MD;  Location: [facility]     INCISION AND DRAINAGE FOOT, COMBINED Right 7/24/2022    Procedure: Incision and drainage, right foot ;  Surgeon: Valentino Molina DPM;  [facility]: [facility]     RECESSION GASTROCNEMIUS Right 2/1/2016    Procedure: RECESSION GASTROCNEMIUS;  Surgeon: Rachelle Manriquez DPM, Pod;  Location: [facility]        MEDICATIONS:  Reviewed in Epic.     ALLERGIES:    Allergies    Allergen Reactions     Bactrim [Sulfamethoxazole W/Trimethoprim] Nausea and Vomiting     Sulfa Drugs Nausea     Niacin Swelling     SIMCOR caused edema in extremities     Simvastatin Swelling     SIMCOR caused edema in extremities        SOCIAL HISTORY:   Social History     Socioeconomic History     Marital status:      Spouse name: Sydney     Number of children: 2     Years of education: Not on file     Highest education level: Master's degree (e.g., MA, MS, Arleth, MEd, MSW, ELIANA)   Occupational History     Occupation: marketing     Employer: [employer]     Comment: [employer]   Tobacco Use     Smoking status: Never Smoker     Smokeless tobacco: Never Used   Vaping Use     Vaping Use: Never used   Substance and Sexual Activity     Alcohol use: Yes     Alcohol/week: 0.0 standard drinks     Comment: rare---red wine 3x per month     Drug use: No     Sexual activity: Not Currently     Partners: Female   Other Topics Concern     Parent/sibling w/ CABG, MI or angioplasty before 65F 55M? No   Social History Narrative     Not on file     Social Determinants of Health     Financial Resource Strain: Low Risk      Difficulty of Paying Living Expenses: Not very hard   Food Insecurity: No Food Insecurity     Worried About Running Out of Food in the Last Year: Never true     Ran Out of Food in the Last Year: Never true   Transportation Needs: No Transportation Needs     Lack of Transportation (Medical): No     Lack of Transportation (Non-Medical): No   Physical Activity: Sufficiently Active     Days of Exercise per Week: 4 days     Minutes of Exercise per Session: 40 min   Stress: No Stress Concern Present     Feeling of Stress : Not at all   Social Connections: Moderately Isolated     Frequency of Communication with Friends and Family: Once a week     Frequency of Social Gatherings with Friends and Family: Never     Attends Cheondoism Services: More than 4 times per year     Active Member of Clubs or Organizations: No      "Attends Club or Organization Meetings: Not on file     Marital Status:    Intimate Partner Violence: Not on file   Housing Stability: Low Risk      Unable to Pay for Housing in the Last Year: No     Number of Places Lived in the Last Year: 2     Unstable Housing in the Last Year: No        FAMILY HISTORY:   Family History   Problem Relation Age of Onset     Arthritis Mother         SLE     Connective Tissue Disorder Mother         lupus     Diabetes Mother      Cerebrovascular Disease Mother      Cancer Mother      Diabetes Father      Diabetes Maternal Grandfather      Diabetes Sister         EXAM:Vitals: /68 (BP Location: Right arm)   Ht 1.803 m (5' 11\")   Wt 103.1 kg (227 lb 3.2 oz)   BMI 31.69 kg/m    BMI= Body mass index is 31.69 kg/m .      General appearance: Patient is alert and fully cooperative with history & exam.  No sign of distress is noted during the visit.      Respiratory: Breathing is regular & unlabored while sitting.      HEENT: Hearing is intact to spoken word.  Speech is clear.  No gross evidence of visual impairment that would impact ambulation.      Dermatologic: Right foot incision site: well healed and epithelialized. No dehiscene. No signs of infection.   -Submet 2 right foot callus, no open lesions. No cellulitis. Debrided.   Left foot submet one and submet 2 preulcerative lesions. Debrided. Submet 2 site superficial, but of more concern than submet one site. No cellulitis or probe to bone.      Vascular: Dorsalis pedis and posterior tibial pulses are intact & regular bilaterally.  CFT and skin temperature is normal to both lower extremities.       Neurologic: Lower extremity sensation is diminished, bilateral foot, to light touch.  No evidence of neurological-based weakness or contracture in the lower extremities.       Musculoskeletal: Patient is ambulatory without an assistive device or brace.  S/p right first ray resection  S/p left hallux and second digit amputations "     Psychiatric: Affect is pleasant & appropriate.    Foot, Right; Bone Resection          0 Result Notes    Culture 1+ Staphylococcus aureus Abnormal            Resulting Agency: [facility]       Susceptibility     Staphylococcus aureus     JEFFREY     Clindamycin <=0.25 ug/mL Susceptible 1     Erythromycin >=8 ug/mL Resistant     Gentamicin <=0.5 ug/mL Susceptible     Oxacillin <=0.25 ug/mL Susceptible 2     Tetracycline <=1 ug/mL Susceptible     Trimethoprim/Sulfamethoxazole <=0.5/9.5 u... Susceptible     Vancomycin 1 ug/mL Susceptible               Final Diagnosis   A.  Bone, right foot first metatarsal proximal margin: Biopsy:  - Bone with partial necrosis and acute inflammation, consistent with acute osteomyelitis     PROCEDURE:   Verbal consent was obtained for debridement. A 15 blade was used to excise the nonivable tissue to the ulcer on the left foot, down to and including dermal tissue tissue. No noted purulence afterwards. Debrided site measures 1cm x .5 x .1 cm. No probe to bone. Well tolerated. Site was cleansed with alcohol.       ASSESSMENT:  1. S/p right first ray resection on 7/28, with residual osteomyelitis --> now healed   2. Left foot submet two ulcer  --> preulcerative --> remain stable.      MEDICAL DECISION MAKING:   -Sites all remain healed. Patient getting to be more and more active. Is now back to work 5 days a week and is golfing. Is hoping to start excecising again.   -Okay for patient to slowly start exercising. Is still waiting for CMOs to come in. Discussed that first goal should be for him to be able to work full time, without using the boot and without any drainage to site. But overall, would be appropriate to start exercise plan with close and careful monitoring. He agrees to this.   -Will see back in one month. No open lesions. Several areas that are being watched. Patient to call if any questions or concerns sooner        SHRUTHI Rios Department of Podiatry/Foot & Ankle  Surgery

## 2022-10-09 ENCOUNTER — HEALTH MAINTENANCE LETTER (OUTPATIENT)
Age: 62
End: 2022-10-09

## 2022-10-23 DIAGNOSIS — E11.42 TYPE 2 DIABETES MELLITUS WITH PERIPHERAL NEUROPATHY (H): Primary | ICD-10-CM

## 2022-10-24 RX ORDER — SEMAGLUTIDE 1.34 MG/ML
INJECTION, SOLUTION SUBCUTANEOUS
Qty: 9 ML | Refills: 0 | Status: SHIPPED | OUTPATIENT
Start: 2022-10-24 | End: 2023-01-19

## 2022-10-26 ENCOUNTER — OFFICE VISIT (OUTPATIENT)
Dept: PODIATRY | Facility: CLINIC | Age: 62
End: 2022-10-26
Payer: COMMERCIAL

## 2022-10-26 VITALS — DIASTOLIC BLOOD PRESSURE: 72 MMHG | SYSTOLIC BLOOD PRESSURE: 124 MMHG

## 2022-10-26 DIAGNOSIS — L97.511 ULCER OF RIGHT FOOT, LIMITED TO BREAKDOWN OF SKIN (H): Primary | ICD-10-CM

## 2022-10-26 PROCEDURE — 11042 DBRDMT SUBQ TIS 1ST 20SQCM/<: CPT | Mod: 58 | Performed by: PODIATRIST

## 2022-10-26 PROCEDURE — 99024 POSTOP FOLLOW-UP VISIT: CPT | Performed by: PODIATRIST

## 2022-10-26 RX ORDER — CEPHALEXIN 500 MG/1
CAPSULE ORAL
COMMUNITY
Start: 2022-09-14 | End: 2023-07-10

## 2022-10-26 NOTE — PROGRESS NOTES
This is a recent snapshot of the patient's [facility] medical record.  For current drug dose and complete information and questions, call [phone] or In Basket pool, [facility] ([id number])  CSN Number:  [id number]

## 2022-10-26 NOTE — PROGRESS NOTES
[place] PODI[facility]/FOOT & ANKLE SURGERY  CLINIC NOTE    CHIEF COMPLAINT:  right foot    PATIENT HISTORY:  Crispin Rojas is a 62 year old male  who presents for follow up for bilateral feet. Has several preulcerative lesions/healing ulcers. Has been fairly active and is back to work full time. States that he's had no significant foot issues. Minimal drainage every once in a while from his left foot. States that he's picking up his new golf shoes ([employer]) with inserts today. Denies pain. States has been golfing a fair amount. Denies N/F/V/C/D     Previously:He was seen in the hospital for a right foot infection and had osteomyelitis to the entire first metatarsal. Long discussions were had in the hospital and patient did not want to have the entire metatarsal removed due to being relatively active. He was discharged with IV abx and started hyperbaric oxygen treatment to treat osteomyelitis.   -Since being discharged, he's been doing well. He's been changing the dressing and having essentially no drainage. Denies pain to site. Denies N/F/V/C/D          Review of Systems:  A 10 point review of systems was performed and is positive for that noted above in the patient history.  All other areas are negative.     PAST MEDICAL HISTORY:   Past Medical History:   Diagnosis Date     Cerebral infarction (H)     2010     Chronic infection      H/O blood clots 2022     Hyperlipidemia LDL goal <70      Hypertension      Numbness and tingling      Obesity      GILA (obstructive sleep apnea) 10/22/2018    CPAP intolerant     PVD (peripheral vascular disease) (H)     s/p left partial toe amputation     Renal stone      Tremor      Type 2 diabetes mellitus with diabetic polyneuropathy, with long-term current use of insulin (H)         PAST SURGICAL HISTORY:   Past Surgical History:   Procedure Laterality Date     AMPUTATE FOOT Left 8/18/2016    Procedure: AMPUTATE FOOT;  Surgeon: Jack Younger DPM;  [facility]     AMPUTATE  TOE(S) Right 2/1/2016    Procedure: AMPUTATE TOE(S);  Surgeon: Rachelle Manriquez DPM, Pod;  [facility]: [facility]     AMPUTATE TOE(S) Right 8/2/2019    Procedure: Right fourth toe partial amputation for treatment of osteomyelitis.;  Surgeon: Jack Younger DPM;  [facility]     AMPUTATE TOE(S) Left 11/8/2019    Procedure: Left second toe amputation at the metatarsophalangeal joint.;  Surgeon: Rachelle Manriquez DPM, Podiatry/Foot and Ankle Surgery;  [facility]     AMPUTATE TOE(S) Right 6/5/2022    Procedure: AMPUTATION OF RIGHT GREAT TOE;  Surgeon: Wili Quiles DPM;  [facility]     AMPUTATE TOE(S) Right 7/28/2022    Procedure: Right foot partial first metatarsal resection;  Surgeon: Tiny Soto DPM;  Location: [facility]     ANGIOGRAM       BIOPSY BONE FOOT Right 7/24/2022    Procedure: Bone biopsy, right first metatarsal.;  Surgeon: Valentino Molina DPM;  Location: [facility]     COLONOSCOPY       COMBINED CYSTOSCOPY, RETROGRADES, URETEROSCOPY, INSERT STENT Left 8/21/2016    Procedure: COMBINED CYSTOSCOPY, RETROGRADES, URETEROSCOPY, INSERT STENT;  Surgeon: Artemio Valenzuela MD;  [facility]     COMBINED CYSTOSCOPY, RETROGRADES, URETEROSCOPY, LASER HOLMIUM LITHOTRIPSY URETER(S), INSERT STENT Left 10/3/2016    Procedure: COMBINED CYSTOSCOPY, RETROGRADES, URETEROSCOPY, LASER HOLMIUM LITHOTRIPSY URETER(S), INSERT STENT;  Surgeon: Artemio Valenzuela MD;  [facility]     EXTRACORPOREAL SHOCK WAVE LITHOTRIPSY (ESWL) Left 9/8/2016    Procedure: EXTRACORPOREAL SHOCK WAVE LITHOTRIPSY (ESWL);  Surgeon: Artemio Valenzuela MD;  Location: [facility]     INCISION AND DRAINAGE FOOT, COMBINED Right 7/24/2022    Procedure: Incision and drainage, right foot ;  Surgeon: Valentino Molina DPM;  [facility]     RECESSION GASTROCNEMIUS Right 2/1/2016    Procedure: RECESSION GASTROCNEMIUS;  Surgeon: Rachelle Manriquez DPM, Pod;  [facility]        MEDICATIONS:  Reviewed in Epic.     ALLERGIES:     Allergies   Allergen Reactions     Bactrim [Sulfamethoxazole W/Trimethoprim] Nausea and Vomiting     Sulfa Drugs Nausea     Niacin Swelling     SIMCOR caused edema in extremities     Simvastatin Swelling     SIMCOR caused edema in extremities        SOCIAL HISTORY:   Social History     Socioeconomic History     Marital status:      Spouse name: Sydney     Number of children: 2     Years of education: Not on file     Highest education level: Master's degree (e.g., MA, MS, Arleth, MEd, MSW, ELIANA)   Occupational History     Occupation: marketing     Employer: [employer]     Comment: [employer]   Tobacco Use     Smoking status: Never     Smokeless tobacco: Never   Vaping Use     Vaping Use: Never used   Substance and Sexual Activity     Alcohol use: Yes     Alcohol/week: 0.0 standard drinks     Comment: rare---red wine 3x per month     Drug use: No     Sexual activity: Not Currently     Partners: Female   Other Topics Concern     Parent/sibling w/ CABG, MI or angioplasty before 65F 55M? No   Social History Narrative     Not on file     Social Determinants of Health     Financial Resource Strain: Low Risk      Difficulty of Paying Living Expenses: Not very hard   Food Insecurity: No Food Insecurity     Worried About Running Out of Food in the Last Year: Never true     Ran Out of Food in the Last Year: Never true   Transportation Needs: No Transportation Needs     Lack of Transportation (Medical): No     Lack of Transportation (Non-Medical): No   Physical Activity: Sufficiently Active     Days of Exercise per Week: 4 days     Minutes of Exercise per Session: 40 min   Stress: No Stress Concern Present     Feeling of Stress : Not at all   Social Connections: Moderately Isolated     Frequency of Communication with Friends and Family: Once a week     Frequency of Social Gatherings with Friends and Family: Never     Attends Jewish Services: More than 4 times per year     Active Member of Clubs or Organizations: No      Attends Club or Organization Meetings: Not on file     Marital Status:    Intimate Partner Violence: Not on file   Housing Stability: Low Risk      Unable to Pay for Housing in the Last Year: No     Number of Places Lived in the Last Year: 2     Unstable Housing in the Last Year: No        FAMILY HISTORY:   Family History   Problem Relation Age of Onset     Arthritis Mother         SLE     Connective Tissue Disorder Mother         lupus     Diabetes Mother      Cerebrovascular Disease Mother      Cancer Mother      Diabetes Father      Diabetes Maternal Grandfather      Diabetes Sister         EXAM:Vitals: There were no vitals taken for this visit.  BMI= There is no height or weight on file to calculate BMI.      General appearance: Patient is alert and fully cooperative with history & exam.  No sign of distress is noted during the visit.      Respiratory: Breathing is regular & unlabored while sitting.      HEENT: Hearing is intact to spoken word.  Speech is clear.  No gross evidence of visual impairment that would impact ambulation.      Dermatologic:  -Submet 2 right foot callus/preulcerative lesion, no open lesions. No cellulitis. Debrided to level of dermis   Left foot submet 3 callus/preuclerative lesion, debrided to level of dermis. No open lesions or cellulitis.      Vascular: Dorsalis pedis and posterior tibial pulses are intact & regular bilaterally.  CFT and skin temperature is normal to both lower extremities.       Neurologic: Lower extremity sensation is diminished, bilateral foot, to light touch.  No evidence of neurological-based weakness or contracture in the lower extremities.       Musculoskeletal: Patient is ambulatory without an assistive device or brace.  S/p right first ray resection  S/p left hallux and second digit amputations     Psychiatric: Affect is pleasant & appropriate.    Foot, Right; Bone Resection          0 Result Notes    Culture 1+ Staphylococcus aureus Abnormal             Resulting Agency: [facility]       Susceptibility     Staphylococcus aureus     JEFFREY     Clindamycin <=0.25 ug/mL Susceptible 1     Erythromycin >=8 ug/mL Resistant     Gentamicin <=0.5 ug/mL Susceptible     Oxacillin <=0.25 ug/mL Susceptible 2     Tetracycline <=1 ug/mL Susceptible     Trimethoprim/Sulfamethoxazole <=0.5/9.5 u... Susceptible     Vancomycin 1 ug/mL Susceptible               Final Diagnosis   A.  Bone, right foot first metatarsal proximal margin: Biopsy:  - Bone with partial necrosis and acute inflammation, consistent with acute osteomyelitis     PROCEDURE:   Verbal consent was obtained for debridement. A 15 blade was used to excise the nonivable tissue to the preulcerative lesions on both sides. Debrided down to and including dermis.  No noted purulence afterwards. Debrided sites measure 1cm x 1 x .1 cm on left foot and 3 cm x 2 cm x .1cm on the right foot. No probe to bone. Well tolerated. Site was cleansed with alcohol.       ASSESSMENT:  1. Left foot submet two ulcer  --> preulcerative --> remain stable.   2. Right foot submet two preulcerative lesion     MEDICAL DECISION MAKING:   -All sites remain closed and are preulcerative lesions at this point. Debrided and no open tissue.   -Discussed risks of worsening due to foot type and previous ulcerations. He has a first ray resection on right foot and left foot hallux and 2nd digit amp, so these are transfer induced. Recommend floating metatarsal osteotomies to both feet. Would need new xrays to finalize this plan. Discussed assumed treatment course which would be approx 6 weeks before back to full activity.   -He plans to  new shoes and see if CMO assist in offloading so he can avoid surgery.   Will see back in apporx 1 month to re-evaluate this.   -Reasons to come back sooner: increased drainage, open lesions, swelling, pain.       Tiny Soto DPM   [facility] of Podiatry/Foot & Ankle Surgery

## 2022-10-26 NOTE — LETTER
10/26/2022         RE: Crispin Rojas  [address] [facility] [address]        Dear Colleague,    Thank you for referring your patient, Crispin Rojas, to the [facility]. Please see a copy of my visit note below.    [facility] PODIAT[facility]/FOOT & ANKLE SURGERY  CLINIC NOTE    CHIEF COMPLAINT:  right foot    PATIENT HISTORY:  Crispin Rojas is a 62 year old male  who presents for follow up for bilateral feet. Has several preulcerative lesions/healing ulcers. Has been fairly active and is back to work full time. States that he's had no significant foot issues. Minimal drainage every once in a while from his left foot. States that he's picking up his new golf shoes ([employer]) with inserts today. Denies pain. States has been golfing a fair amount. Denies N/F/V/C/D     Previously:He was seen in the hospital for a right foot infection and had osteomyelitis to the entire first metatarsal. Long discussions were had in the hospital and patient did not want to have the entire metatarsal removed due to being relatively active. He was discharged with IV abx and started hyperbaric oxygen treatment to treat osteomyelitis.   -Since being discharged, he's been doing well. He's been changing the dressing and having essentially no drainage. Denies pain to site. Denies N/F/V/C/D          Review of Systems:  A 10 point review of systems was performed and is positive for that noted above in the patient history.  All other areas are negative.     PAST MEDICAL HISTORY:   Past Medical History:   Diagnosis Date     Cerebral infarction (H)     2010     Chronic infection      H/O blood clots 2022     Hyperlipidemia LDL goal <70      Hypertension      Numbness and tingling      Obesity      GILA (obstructive sleep apnea) 10/22/2018    CPAP intolerant     PVD (peripheral vascular disease) (H)     s/p left partial toe amputation     Renal stone      Tremor      Type 2 diabetes mellitus with diabetic  polyneuropathy, with long-term current use of insulin (H)         PAST SURGICAL HISTORY:   Past Surgical History:   Procedure Laterality Date     AMPUTATE FOOT Left 8/18/2016    Procedure: AMPUTATE FOOT;  Surgeon: Jack Younger DPM;  [facility]     AMPUTATE TOE(S) Right 2/1/2016    Procedure: AMPUTATE TOE(S);  Surgeon: Rachelle Manriquez DPM, Pod;  [facility]     AMPUTATE TOE(S) Right 8/2/2019    Procedure: Right fourth toe partial amputation for treatment of osteomyelitis.;  Surgeon: Jack Younger DPM;  [facility]     AMPUTATE TOE(S) Left 11/8/2019    Procedure: Left second toe amputation at the metatarsophalangeal joint.;  Surgeon: Rachelle Manriquez DPM, Podiatry/Foot and Ankle Surgery;  [facility]     AMPUTATE TOE(S) Right 6/5/2022    Procedure: AMPUTATION OF RIGHT GREAT TOE;  Surgeon: Wili Quiles DPM;  [facility]     AMPUTATE TOE(S) Right 7/28/2022    Procedure: Right foot partial first metatarsal resection;  Surgeon: Tiny Soto DPM;  Location: [facility]     ANGIOGRAM       BIOPSY BONE FOOT Right 7/24/2022    Procedure: Bone biopsy, right first metatarsal.;  Surgeon: Valentino Molina DPM;  [facility]: [facility]     COLONOSCOPY       COMBINED CYSTOSCOPY, RETROGRADES, URETEROSCOPY, INSERT STENT Left 8/21/2016    Procedure: COMBINED CYSTOSCOPY, RETROGRADES, URETEROSCOPY, INSERT STENT;  Surgeon: Artemio Valenzuela MD;  [facility]     COMBINED CYSTOSCOPY, RETROGRADES, URETEROSCOPY, LASER HOLMIUM LITHOTRIPSY URETER(S), INSERT STENT Left 10/3/2016    Procedure: COMBINED CYSTOSCOPY, RETROGRADES, URETEROSCOPY, LASER HOLMIUM LITHOTRIPSY URETER(S), INSERT STENT;  Surgeon: Artemio Valenzuela MD;  [facility]     EXTRACORPOREAL SHOCK WAVE LITHOTRIPSY (ESWL) Left 9/8/2016    Procedure: EXTRACORPOREAL SHOCK WAVE LITHOTRIPSY (ESWL);  Surgeon: Artemio Valenzuela MD;  [facility]     INCISION AND DRAINAGE FOOT, COMBINED Right 7/24/2022    Procedure: Incision  and drainage, right foot ;  Surgeon: Valentino Molina DPM;  [facility]     RECESSION GASTROCNEMIUS Right 2/1/2016    Procedure: RECESSION GASTROCNEMIUS;  Surgeon: Rachelle Manriquez DPM, Pod;  [facility]: [facility]        MEDICATIONS:  Reviewed in Epic.     ALLERGIES:    Allergies   Allergen Reactions     Bactrim [Sulfamethoxazole W/Trimethoprim] Nausea and Vomiting     Sulfa Drugs Nausea     Niacin Swelling     SIMCOR caused edema in extremities     Simvastatin Swelling     SIMCOR caused edema in extremities        SOCIAL HISTORY:   Social History     Socioeconomic History     Marital status:      Spouse name: Sydney     Number of children: 2     Years of education: Not on file     Highest education level: Master's degree (e.g., MA, MS, Arleth, MEd, MSW, ELIANA)   Occupational History     Occupation: marketing     Employer: [employer]     Comment: "[employer]   Tobacco Use     Smoking status: Never     Smokeless tobacco: Never   Vaping Use     Vaping Use: Never used   Substance and Sexual Activity     Alcohol use: Yes     Alcohol/week: 0.0 standard drinks     Comment: rare---red wine 3x per month     Drug use: No     Sexual activity: Not Currently     Partners: Female   Other Topics Concern     Parent/sibling w/ CABG, MI or angioplasty before 65F 55M? No   Social History Narrative     Not on file     Social Determinants of Health     Financial Resource Strain: Low Risk      Difficulty of Paying Living Expenses: Not very hard   Food Insecurity: No Food Insecurity     Worried About Running Out of Food in the Last Year: Never true     Ran Out of Food in the Last Year: Never true   Transportation Needs: No Transportation Needs     Lack of Transportation (Medical): No     Lack of Transportation (Non-Medical): No   Physical Activity: Sufficiently Active     Days of Exercise per Week: 4 days     Minutes of Exercise per Session: 40 min   Stress: No Stress Concern Present     Feeling of Stress : Not at all   Social  Connections: Moderately Isolated     Frequency of Communication with Friends and Family: Once a week     Frequency of Social Gatherings with Friends and Family: Never     Attends Holiness Services: More than 4 times per year     Active Member of Clubs or Organizations: No     Attends Club or Organization Meetings: Not on file     Marital Status:    Intimate Partner Violence: Not on file   Housing Stability: Low Risk      Unable to Pay for Housing in the Last Year: No     Number of Places Lived in the Last Year: 2     Unstable Housing in the Last Year: No        FAMILY HISTORY:   Family History   Problem Relation Age of Onset     Arthritis Mother         SLE     Connective Tissue Disorder Mother         lupus     Diabetes Mother      Cerebrovascular Disease Mother      Cancer Mother      Diabetes Father      Diabetes Maternal Grandfather      Diabetes Sister         EXAM:Vitals: There were no vitals taken for this visit.  BMI= There is no height or weight on file to calculate BMI.      General appearance: Patient is alert and fully cooperative with history & exam.  No sign of distress is noted during the visit.      Respiratory: Breathing is regular & unlabored while sitting.      HEENT: Hearing is intact to spoken word.  Speech is clear.  No gross evidence of visual impairment that would impact ambulation.      Dermatologic:  -Submet 2 right foot callus/preulcerative lesion, no open lesions. No cellulitis. Debrided to level of dermis   Left foot submet 3 callus/preuclerative lesion, debrided to level of dermis. No open lesions or cellulitis.      Vascular: Dorsalis pedis and posterior tibial pulses are intact & regular bilaterally.  CFT and skin temperature is normal to both lower extremities.       Neurologic: Lower extremity sensation is diminished, bilateral foot, to light touch.  No evidence of neurological-based weakness or contracture in the lower extremities.       Musculoskeletal: Patient is  ambulatory without an assistive device or brace.  S/p right first ray resection  S/p left hallux and second digit amputations     Psychiatric: Affect is pleasant & appropriate.    Foot, Right; Bone Resection          0 Result Notes    Culture 1+ Staphylococcus aureus Abnormal            Resulting Agency: [facility]       Susceptibility     Staphylococcus aureus     JEFFREY     Clindamycin <=0.25 ug/mL Susceptible 1     Erythromycin >=8 ug/mL Resistant     Gentamicin <=0.5 ug/mL Susceptible     Oxacillin <=0.25 ug/mL Susceptible 2     Tetracycline <=1 ug/mL Susceptible     Trimethoprim/Sulfamethoxazole <=0.5/9.5 u... Susceptible     Vancomycin 1 ug/mL Susceptible               Final Diagnosis   A.  Bone, right foot first metatarsal proximal margin: Biopsy:  - Bone with partial necrosis and acute inflammation, consistent with acute osteomyelitis     PROCEDURE:   Verbal consent was obtained for debridement. A 15 blade was used to excise the nonivable tissue to the preulcerative lesions on both sides. Debrided down to and including dermis.  No noted purulence afterwards. Debrided sites measure 1cm x 1 x .1 cm on left foot and 3 cm x 2 cm x .1cm on the right foot. No probe to bone. Well tolerated. Site was cleansed with alcohol.       ASSESSMENT:  1. Left foot submet two ulcer  --> preulcerative --> remain stable.   2. Right foot submet two preulcerative lesion     MEDICAL DECISION MAKING:   -All sites remain closed and are preulcerative lesions at this point. Debrided and no open tissue.   -Discussed risks of worsening due to foot type and previous ulcerations. He has a first ray resection on right foot and left foot hallux and 2nd digit amp, so these are transfer induced. Recommend floating metatarsal osteotomies to both feet. Would need new xrays to finalize this plan. Discussed assumed treatment course which would be approx 6 weeks before back to full activity.   -He plans to  new shoes and see if CMO assist in  offloading so he can avoid surgery.   Will see back in apporx 1 month to re-evaluate this.   -Reasons to come back sooner: increased drainage, open lesions, swelling, pain.       Tiny Soto DPM   [facility] [facility] of Podiatry/Foot & Ankle Surgery                Again, thank you for allowing me to participate in the care of your patient.        Sincerely,        Tiny Soto DPM

## 2022-11-06 NOTE — PROGRESS NOTES
This is a recent snapshot of the patient's [facility] medical record.  For current drug dose and complete information and questions, call [phone] or In Basket pool, [facility] ([id number])  CSN Number:  [id number]

## 2022-11-10 ENCOUNTER — TELEPHONE (OUTPATIENT)
Dept: PODIATRY | Facility: CLINIC | Age: 62
End: 2022-11-10

## 2022-11-10 DIAGNOSIS — I82.441 ACUTE DEEP VEIN THROMBOSIS (DVT) OF TIBIAL VEIN OF RIGHT LOWER EXTREMITY (H): ICD-10-CM

## 2022-11-10 NOTE — TELEPHONE ENCOUNTER
Phone call to patient. He has a new area on the bottom of his right foot near the old incision that is draining a clear foul drainage in small amounts for the past 2 days. He noticed pain on the outside of his foot yesterday. There is no redness but he states he thinks there is a callus that is draining from underneath. He prefers to see Dr. Soto. He was informed she is out of the office the rest of the week and he should not wait to be seen. He denies fever, chills, or feeling ill. Offered appointment with Dr. Jones tomorrow in [facility] and that was scheduled for 8:15 am with 8:00 am arrival. Address provided. He will follow up at appointment and verbalized understanding.     AMMON Ceron RN

## 2022-11-10 NOTE — TELEPHONE ENCOUNTER
Reason for call:  Patient developed ulcer and needs appointment with Dr. Soto. Scheduled to see her on 11/23 but hoping to get in next week Wed. if possible    Cell number on file:    Telephone Information:   Mobile [phone]

## 2022-11-11 ENCOUNTER — OFFICE VISIT (OUTPATIENT)
Dept: PODIATRY | Facility: CLINIC | Age: 62
End: 2022-11-11
Payer: COMMERCIAL

## 2022-11-11 VITALS
WEIGHT: 227 LBS | BODY MASS INDEX: 31.78 KG/M2 | SYSTOLIC BLOOD PRESSURE: 136 MMHG | DIASTOLIC BLOOD PRESSURE: 73 MMHG | HEIGHT: 71 IN | HEART RATE: 76 BPM

## 2022-11-11 DIAGNOSIS — E11.42 TYPE 2 DIABETES MELLITUS WITH PERIPHERAL NEUROPATHY (H): Primary | ICD-10-CM

## 2022-11-11 DIAGNOSIS — L97.519 TYPE 2 DIABETES MELLITUS WITH RIGHT DIABETIC FOOT ULCER (H): ICD-10-CM

## 2022-11-11 DIAGNOSIS — E11.621 TYPE 2 DIABETES MELLITUS WITH RIGHT DIABETIC FOOT ULCER (H): ICD-10-CM

## 2022-11-11 PROCEDURE — 11042 DBRDMT SUBQ TIS 1ST 20SQCM/<: CPT | Performed by: PODIATRIST

## 2022-11-11 PROCEDURE — 99213 OFFICE O/P EST LOW 20 MIN: CPT | Mod: 25 | Performed by: PODIATRIST

## 2022-11-11 RX ORDER — APIXABAN 5 MG/1
5 TABLET, FILM COATED ORAL 2 TIMES DAILY
Qty: 60 TABLET | Refills: 2 | Status: SHIPPED | OUTPATIENT
Start: 2022-11-11 | End: 2023-01-11

## 2022-11-11 NOTE — TELEPHONE ENCOUNTER
Routing refill request to provider for review/approval because:  Labs out of range:  Platelets Normal Platelets on file in past 12 months      Recent Labs   Lab Test 09/12/22  1232   *       Teresa Lakhani R.N.

## 2022-11-11 NOTE — PROGRESS NOTES
Assessment:      ICD-10-CM    1. Type 2 diabetes mellitus with peripheral neuropathy (H)  E11.42 DEBRIDE SKIN/SUBQ TISSUE      2. Type 2 diabetes mellitus with right diabetic foot ulcer (H)  E11.621 DEBRIDE SKIN/SUBQ TISSUE    L97.519            Plan:  Orders Placed This Encounter   Procedures     DEBRIDE SKIN/SUBQ TISSUE       Discussed the etiology and treatment of the condition with the patient.  Imaging studies reviewed and discussed with the patient.  Discussed surgical and conservative options.    Recommend referral to [facility] for formal offloading and advanced wound care products.    -Wound care clinic referral.  -Wound debridement-  Procedure:  Wound Debridement  The wound was sharply debrided with a scalpel blade or tissue nipper, fullthickness, down to subcu, removing any non-viable or infected tissue.  Hemostasis achieved.  Patient tolerated the procedure well.    -Dressing- mepilex  -Offloading- wound care clinic  -Antibiotics- aready on    Patient instructed on continued home wound care, offloading, activity, and weight bearing.    Return:  No follow-ups on file.     Cara Jones DPM                Chief Complaint:     Patient presents with:  Right Foot - Pain       HPI:  Crispin Rojas is a 62 year old year old male who presents for evaluation of a foot/ankle wound.    Last HbA1C -   Hemoglobin A1C   Date Value Ref Range Status   06/03/2022 6.6 (H) 0.0 - 5.6 % Final     Comment:     Normal <5.7%   Prediabetes 5.7-6.4%    Diabetes 6.5% or higher     Note: Adopted from ADA consensus guidelines.   06/22/2021 6.5 (H) 0 - 5.6 % Final     Comment:     Normal <5.7% Prediabetes 5.7-6.4%  Diabetes 6.5% or higher - adopted from ADA   consensus guidelines.           Past Medical & Surgical History:  Past Medical History:   Diagnosis Date     Cerebral infarction (H)     2010     Chronic infection      H/O blood clots 2022     Hyperlipidemia LDL goal <70      Hypertension      Numbness and  tingling      Obesity      GILA (obstructive sleep apnea) 10/22/2018    CPAP intolerant     PVD (peripheral vascular disease) (H)     s/p left partial toe amputation     Renal stone      Tremor      Type 2 diabetes mellitus with diabetic polyneuropathy, with long-term current use of insulin (H)       Past Surgical History:   Procedure Laterality Date     AMPUTATE FOOT Left 8/18/2016    Procedure: AMPUTATE FOOT;  Surgeon: Jack Younger DPM;  Location: [facility]     AMPUTATE TOE(S) Right 2/1/2016    Procedure: AMPUTATE TOE(S);  Surgeon: Rachelle Manriquez DPM, Pod;  Location: [facility]     AMPUTATE TOE(S) Right 8/2/2019    Procedure: Right fourth toe partial amputation for treatment of osteomyelitis.;  Surgeon: Jack Younger DPM;  [facility]     AMPUTATE TOE(S) Left 11/8/2019    Procedure: Left second toe amputation at the metatarsophalangeal joint.;  Surgeon: Rachelle Manriquez DPM, Podiatry/Foot and Ankle Surgery;  Location: [facility]     AMPUTATE TOE(S) Right 6/5/2022    Procedure: AMPUTATION OF RIGHT GREAT TOE;  Surgeon: Wili Quiles DPM;  Location: [facility]     AMPUTATE TOE(S) Right 7/28/2022    Procedure: Right foot partial first metatarsal resection;  Surgeon: Tiny Soto DPM;  Location: [facility]     ANGIOGRAM       BIOPSY BONE FOOT Right 7/24/2022    Procedure: Bone biopsy, right first metatarsal.;  Surgeon: Valentino Molina DPM;  Location: [facility]     COLONOSCOPY       COMBINED CYSTOSCOPY, RETROGRADES, URETEROSCOPY, INSERT STENT Left 8/21/2016    Procedure: COMBINED CYSTOSCOPY, RETROGRADES, URETEROSCOPY, INSERT STENT;  Surgeon: Artemio Valenzuela MD;  Location: [facility]     COMBINED CYSTOSCOPY, RETROGRADES, URETEROSCOPY, LASER HOLMIUM LITHOTRIPSY URETER(S), INSERT STENT Left 10/3/2016    Procedure: COMBINED CYSTOSCOPY, RETROGRADES, URETEROSCOPY, LASER HOLMIUM LITHOTRIPSY URETER(S), INSERT STENT;  Surgeon: Artemio Valenzuela MD;  [facility]     EXTRACORPOREAL SHOCK WAVE LITHOTRIPSY  (ESWL) Left 9/8/2016    Procedure: EXTRACORPOREAL SHOCK WAVE LITHOTRIPSY (ESWL);  Surgeon: Artemio Valenzuela MD;  Location: [facility]     INCISION AND DRAINAGE FOOT, COMBINED Right 7/24/2022    Procedure: Incision and drainage, right foot ;  Surgeon: Valentino Molina DPM;  [facility]     RECESSION GASTROCNEMIUS Right 2/1/2016    Procedure: RECESSION GASTROCNEMIUS;  Surgeon: Rachelle Manriquez DPM, Pod;  Location: [facility]      Family History   Problem Relation Age of Onset     Arthritis Mother         SLE     Connective Tissue Disorder Mother         lupus     Diabetes Mother      Cerebrovascular Disease Mother      Cancer Mother      Diabetes Father      Diabetes Maternal Grandfather      Diabetes Sister         Social History:  ?  History   Smoking Status     Never   Smokeless Tobacco     Never     History   Drug Use No     Social History    Substance and Sexual Activity      Alcohol use: Yes        Alcohol/week: 0.0 standard drinks        Comment: rare---red wine 3x per month      Allergies:  ?   Allergies   Allergen Reactions     Bactrim [Sulfamethoxazole W/Trimethoprim] Nausea and Vomiting     Sulfa Drugs Nausea     Niacin Swelling     SIMCOR caused edema in extremities     Simvastatin Swelling     SIMCOR caused edema in extremities        Medications:    Current Outpatient Medications   Medication     acetaminophen (TYLENOL) 325 MG tablet     amLODIPine (NORVASC) 5 MG tablet     aspirin (ASA) 81 MG EC tablet     atorvastatin (LIPITOR) 40 MG tablet     blood glucose (NO BRAND SPECIFIED) lancets standard     calcium carbonate (OS-NARA) 500 MG tablet     cephALEXin (KEFLEX) 500 MG capsule     Cholecalciferol (VITAMIN D3) 25 MCG (1000 UT) CAPS     Co-Enzyme Q10 100 MG CAPS     collagenase (SANTYL) 250 UNIT/GM external ointment     Continuous Blood Gluc Sensor (FREESTYLE LUCERO 14 DAY SENSOR) MISC     ELIQUIS ANTICOAGULANT 5 MG tablet     furosemide (LASIX) 20 MG tablet     gabapentin (NEURONTIN) 100 MG capsule  "    hydrochlorothiazide (MICROZIDE) 12.5 MG capsule     insulin aspart (NOVOLOG PEN) 100 UNIT/ML pen     insulin degludec (TRESIBA FLEXTOUCH) 100 UNIT/ML pen     insulin pen needle (B-D U/F) 31G X 5 MM miscellaneous     ketoconazole (NIZORAL) 2 % external shampoo     lisinopril (ZESTRIL) 40 MG tablet     MAGNESIUM GLYCINATE PLUS PO     metFORMIN (GLUCOPHAGE XR) 500 MG 24 hr tablet     Multiple Vitamins-Minerals (MULTIVITAMIN PO)     Omega-3 Fatty Acids (OMEGA-3 FISH OIL PO)     omeprazole (PRILOSEC) 40 MG DR capsule     OZEMPIC, 1 MG/DOSE, 4 MG/3ML SOPN     potassium chloride ER (KLOR-CON M) 10 MEQ CR tablet     primidone (MYSOLINE) 50 MG tablet     tadalafil (CIALIS) 5 MG tablet     No current facility-administered medications for this visit.       Physical Exam:  ?  Vitals:  /73 (BP Location: Left arm)   Pulse 76   Ht 1.803 m (5' 11\")   Wt 103 kg (227 lb)   BMI 31.66 kg/m     General:  WD/WN, in NAD.  A&O x3.  Dermatologic:    Wound present sub 2nd met  Right.  2cm diameter, probes to bon  Mara-wound cellulitis absent.  Vascular:    Digital capillary refill time normal right.  Skin temperature is warm right.  Generalized edema- trace bilateral.  Focal edema- mild foot right.  Neurologic:    Protective sensation abnormal, diminshed.  Gross sensation normal.  Musculoskeletal:  No pain to palpation of foot & ankle  aROM intact to foot & ankle  Muscle strength 5/5 foot & ankle    Prior amputations:  Partial 1st ray R              "

## 2022-11-11 NOTE — LETTER
11/11/2022         RE: Crispin Rojas  [address] [place] [address]        Dear Colleague,    Thank you for referring your patient, Crispin Rojas, to the [facility]. Please see a copy of my visit note below.    Assessment:      ICD-10-CM    1. Type 2 diabetes mellitus with peripheral neuropathy (H)  E11.42 DEBRIDE SKIN/SUBQ TISSUE      2. Type 2 diabetes mellitus with right diabetic foot ulcer (H)  E11.621 DEBRIDE SKIN/SUBQ TISSUE    L97.519            Plan:  Orders Placed This Encounter   Procedures     DEBRIDE SKIN/SUBQ TISSUE       Discussed the etiology and treatment of the condition with the patient.  Imaging studies reviewed and discussed with the patient.  Discussed surgical and conservative options.    Recommend referral to Wound Care Clinic for formal offloading and advanced wound care products.    -Wound care clinic referral.  -Wound debridement-  Procedure:  Wound Debridement  The wound was sharply debrided with a scalpel blade or tissue nipper, fullthickness, down to subcu, removing any non-viable or infected tissue.  Hemostasis achieved.  Patient tolerated the procedure well.    -Dressing- mepilex  -Offloading- wound care clinic  -Antibiotics- aready on    Patient instructed on continued home wound care, offloading, activity, and weight bearing.    Return:  No follow-ups on file.     Cara Jones DPM                Chief Complaint:     Patient presents with:  Right Foot - Pain       HPI:  Crispin Rojas is a 62 year old year old male who presents for evaluation of a foot/ankle wound.    Last HbA1C -   Hemoglobin A1C   Date Value Ref Range Status   06/03/2022 6.6 (H) 0.0 - 5.6 % Final     Comment:     Normal <5.7%   Prediabetes 5.7-6.4%    Diabetes 6.5% or higher     Note: Adopted from ADA consensus guidelines.   06/22/2021 6.5 (H) 0 - 5.6 % Final     Comment:     Normal <5.7% Prediabetes 5.7-6.4%  Diabetes 6.5% or higher - adopted from ADA   consensus  guidelines.           Past Medical & Surgical History:  Past Medical History:   Diagnosis Date     Cerebral infarction (H)     2010     Chronic infection      H/O blood clots 2022     Hyperlipidemia LDL goal <70      Hypertension      Numbness and tingling      Obesity      GILA (obstructive sleep apnea) 10/22/2018    CPAP intolerant     PVD (peripheral vascular disease) (H)     s/p left partial toe amputation     Renal stone      Tremor      Type 2 diabetes mellitus with diabetic polyneuropathy, with long-term current use of insulin (H)       Past Surgical History:   Procedure Laterality Date     AMPUTATE FOOT Left 8/18/2016    Procedure: AMPUTATE FOOT;  Surgeon: Jack Younger DPM;  [facility]     AMPUTATE TOE(S) Right 2/1/2016    Procedure: AMPUTATE TOE(S);  Surgeon: Rachelle Manriquez DPM, Pod;  [facility]     AMPUTATE TOE(S) Right 8/2/2019    Procedure: Right fourth toe partial amputation for treatment of osteomyelitis.;  Surgeon: Jack Younger DPM;  [facility]     AMPUTATE TOE(S) Left 11/8/2019    Procedure: Left second toe amputation at the metatarsophalangeal joint.;  Surgeon: Rachelle Manriquez DPM, Podiatry/Foot and Ankle Surgery;  Location[facility]     AMPUTATE TOE(S) Right 6/5/2022    Procedure: AMPUTATION OF RIGHT GREAT TOE;  Surgeon: Wili Quiles DPM;  [facility]     AMPUTATE TOE(S) Right 7/28/2022    Procedure: Right foot partial first metatarsal resection;  Surgeon: Tiny Soto DPM;  Location: [facility]     ANGIOGRAM       BIOPSY BONE FOOT Right 7/24/2022    Procedure: Bone biopsy, right first metatarsal.;  Surgeon: Valentino Molina DPM;  [facility]: [facility]     COLONOSCOPY       COMBINED CYSTOSCOPY, RETROGRADES, URETEROSCOPY, INSERT STENT Left 8/21/2016    Procedure: COMBINED CYSTOSCOPY, RETROGRADES, URETEROSCOPY, INSERT STENT;  Surgeon: Artemio Valenzuela MD;  [facility]     COMBINED CYSTOSCOPY, RETROGRADES, URETEROSCOPY, LASER HOLMIUM LITHOTRIPSY  URETER(S), INSERT STENT Left 10/3/2016    Procedure: COMBINED CYSTOSCOPY, RETROGRADES, URETEROSCOPY, LASER HOLMIUM LITHOTRIPSY URETER(S), INSERT STENT;  Surgeon: Artemio Valenzuela MD;  [facility]     EXTRACORPOREAL SHOCK WAVE LITHOTRIPSY (ESWL) Left 9/8/2016    Procedure: EXTRACORPOREAL SHOCK WAVE LITHOTRIPSY (ESWL);  Surgeon: Artemio Valenzuela MD;  [facility]     INCISION AND DRAINAGE FOOT, COMBINED Right 7/24/2022    Procedure: Incision and drainage, right foot ;  Surgeon: Valentino Molina DPM;  [facility]     RECESSION GASTROCNEMIUS Right 2/1/2016    Procedure: RECESSION GASTROCNEMIUS;  Surgeon: Rachelle Manriquez DPM, Pod;  Location: [facility]      Family History   Problem Relation Age of Onset     Arthritis Mother         SLE     Connective Tissue Disorder Mother         lupus     Diabetes Mother      Cerebrovascular Disease Mother      Cancer Mother      Diabetes Father      Diabetes Maternal Grandfather      Diabetes Sister         Social History:  ?  History   Smoking Status     Never   Smokeless Tobacco     Never     History   Drug Use No     Social History    Substance and Sexual Activity      Alcohol use: Yes        Alcohol/week: 0.0 standard drinks        Comment: rare---red wine 3x per month      Allergies:  ?   Allergies   Allergen Reactions     Bactrim [Sulfamethoxazole W/Trimethoprim] Nausea and Vomiting     Sulfa Drugs Nausea     Niacin Swelling     SIMCOR caused edema in extremities     Simvastatin Swelling     SIMCOR caused edema in extremities        Medications:    Current Outpatient Medications   Medication     acetaminophen (TYLENOL) 325 MG tablet     amLODIPine (NORVASC) 5 MG tablet     aspirin (ASA) 81 MG EC tablet     atorvastatin (LIPITOR) 40 MG tablet     blood glucose (NO BRAND SPECIFIED) lancets standard     calcium carbonate (OS-NARA) 500 MG tablet     cephALEXin (KEFLEX) 500 MG capsule     Cholecalciferol (VITAMIN D3) 25 MCG (1000 UT) CAPS     Co-Enzyme Q10 100  "MG CAPS     collagenase (SANTYL) 250 UNIT/GM external ointment     Continuous Blood Gluc Sensor (FREESTYLE LUCERO 14 DAY SENSOR) MISC     ELIQUIS ANTICOAGULANT 5 MG tablet     furosemide (LASIX) 20 MG tablet     gabapentin (NEURONTIN) 100 MG capsule     hydrochlorothiazide (MICROZIDE) 12.5 MG capsule     insulin aspart (NOVOLOG PEN) 100 UNIT/ML pen     insulin degludec (TRESIBA FLEXTOUCH) 100 UNIT/ML pen     insulin pen needle (B-D U/F) 31G X 5 MM miscellaneous     ketoconazole (NIZORAL) 2 % external shampoo     lisinopril (ZESTRIL) 40 MG tablet     MAGNESIUM GLYCINATE PLUS PO     metFORMIN (GLUCOPHAGE XR) 500 MG 24 hr tablet     Multiple Vitamins-Minerals (MULTIVITAMIN PO)     Omega-3 Fatty Acids (OMEGA-3 FISH OIL PO)     omeprazole (PRILOSEC) 40 MG DR capsule     OZEMPIC, 1 MG/DOSE, 4 MG/3ML SOPN     potassium chloride ER (KLOR-CON M) 10 MEQ CR tablet     primidone (MYSOLINE) 50 MG tablet     tadalafil (CIALIS) 5 MG tablet     No current facility-administered medications for this visit.       Physical Exam:  ?  Vitals:  /73 (BP Location: Left arm)   Pulse 76   Ht 1.803 m (5' 11\")   Wt 103 kg (227 lb)   BMI 31.66 kg/m     General:  WD/WN, in NAD.  A&O x3.  Dermatologic:    Wound present sub 2nd met  Right.  2cm diameter, probes to bon  Mara-wound cellulitis absent.  Vascular:    Digital capillary refill time normal right.  Skin temperature is warm right.  Generalized edema- trace bilateral.  Focal edema- mild foot right.  Neurologic:    Protective sensation abnormal, diminshed.  Gross sensation normal.  Musculoskeletal:  No pain to palpation of foot & ankle  aROM intact to foot & ankle  Muscle strength 5/5 foot & ankle    Prior amputations:  Partial 1st ray R                  Again, thank you for allowing me to participate in the care of your patient.        Sincerely,        Cara Jones DPM    "

## 2022-11-18 DIAGNOSIS — E11.42 TYPE 2 DIABETES MELLITUS WITH PERIPHERAL NEUROPATHY (H): ICD-10-CM

## 2022-11-18 RX ORDER — FLURBIPROFEN SODIUM 0.3 MG/ML
SOLUTION/ DROPS OPHTHALMIC
Qty: 500 EACH | Refills: 0 | Status: SHIPPED | OUTPATIENT
Start: 2022-11-18 | End: 2024-01-18

## 2022-11-21 NOTE — PROGRESS NOTES
This is a recent snapshot of the patient's [facility] medical record.  For current drug dose and complete information and questions, call [phone] or In Basket pool, [facility] ([id number])  CSN Number:  [id number]

## 2022-11-23 ENCOUNTER — ANCILLARY PROCEDURE (OUTPATIENT)
Dept: GENERAL RADIOLOGY | Facility: CLINIC | Age: 62
End: 2022-11-23
Attending: PODIATRIST
Payer: COMMERCIAL

## 2022-11-23 ENCOUNTER — OFFICE VISIT (OUTPATIENT)
Dept: PODIATRY | Facility: CLINIC | Age: 62
End: 2022-11-23
Payer: COMMERCIAL

## 2022-11-23 VITALS — SYSTOLIC BLOOD PRESSURE: 142 MMHG | BODY MASS INDEX: 31.66 KG/M2 | DIASTOLIC BLOOD PRESSURE: 78 MMHG | WEIGHT: 227 LBS

## 2022-11-23 DIAGNOSIS — S98.112A AMPUTATION OF LEFT GREAT TOE (H): ICD-10-CM

## 2022-11-23 DIAGNOSIS — L97.512 RIGHT FOOT ULCER, WITH FAT LAYER EXPOSED (H): ICD-10-CM

## 2022-11-23 DIAGNOSIS — Z89.411 STATUS POST AMPUTATION OF RIGHT GREAT TOE (H): Primary | ICD-10-CM

## 2022-11-23 DIAGNOSIS — Z89.411 STATUS POST AMPUTATION OF RIGHT GREAT TOE (H): ICD-10-CM

## 2022-11-23 PROCEDURE — 73630 X-RAY EXAM OF FOOT: CPT | Mod: TC | Performed by: RADIOLOGY

## 2022-11-23 PROCEDURE — 99214 OFFICE O/P EST MOD 30 MIN: CPT | Mod: 25 | Performed by: PODIATRIST

## 2022-11-23 PROCEDURE — 11042 DBRDMT SUBQ TIS 1ST 20SQCM/<: CPT | Performed by: PODIATRIST

## 2022-11-23 NOTE — LETTER
11/23/2022         RE: Crispin Rojas  [address] [facility] [address]        Dear Colleague,    Thank you for referring your patient, Crispin Rojas, to the [facility] PODI[facility]RY. Please see a copy of my visit note below.    [place] PODIATRY/FOOT & ANKLE SURGERY  CLINIC NOTE    CHIEF COMPLAINT:  right foot    PATIENT HISTORY:  Crispin Rojas is a 62 year old male who follows up for right foot. States at the beginning of the month, he walked 7 miles and developed a blister to his right foot. States site started draining a lot. Followed up with podiatry approx 2 weeks ago. States since then, less drainage and has been wearing CAM boot when being more active. Denies pain to site. Denies N/F/V/C/D. States no issues to left foot.         Review of Systems:  A 10 point review of systems was performed and is positive for that noted above in the patient history.  All other areas are negative.     PAST MEDICAL HISTORY:   Past Medical History:   Diagnosis Date     Cerebral infarction (H)     2010     Chronic infection      H/O blood clots 2022     Hyperlipidemia LDL goal <70      Hypertension      Numbness and tingling      Obesity      GILA (obstructive sleep apnea) 10/22/2018    CPAP intolerant     PVD (peripheral vascular disease) (H)     s/p left partial toe amputation     Renal stone      Tremor      Type 2 diabetes mellitus with diabetic polyneuropathy, with long-term current use of insulin (H)         PAST SURGICAL HISTORY:   Past Surgical History:   Procedure Laterality Date     AMPUTATE FOOT Left 8/18/2016    Procedure: AMPUTATE FOOT;  Surgeon: Jack Younger DPM;  Location: [facility] OR     AMPUTATE TOE(S) Right 2/1/2016    Procedure: AMPUTATE TOE(S);  Surgeon: Rachelle Manriquez DPM, Pod;  Location: [facility]     AMPUTATE TOE(S) Right 8/2/2019    Procedure: Right fourth toe partial amputation for treatment of osteomyelitis.;  Surgeon: Jack Younger DPM;  Location: [facility]      AMPUTATE TOE(S) Left 11/8/2019    Procedure: Left second toe amputation at the metatarsophalangeal joint.;  Surgeon: Rachelle Manriquez DPM, Podiatry/Foot and Ankle Surgery;  Location: [facility] OR     AMPUTATE TOE(S) Right 6/5/2022    Procedure: AMPUTATION OF RIGHT GREAT TOE;  Surgeon: Wili Quiles DPM;  Location: [facility]     AMPUTATE TOE(S) Right 7/28/2022    Procedure: Right foot partial first metatarsal resection;  Surgeon: Tiny Soto DPM;  Location: [facility] OR     ANGIOGRAM       BIOPSY BONE FOOT Right 7/24/2022    Procedure: Bone biopsy, right first metatarsal.;  Surgeon: Valentino Molina DPM;  Location: [facility]     COLONOSCOPY       COMBINED CYSTOSCOPY, RETROGRADES, URETEROSCOPY, INSERT STENT Left 8/21/2016    Procedure: COMBINED CYSTOSCOPY, RETROGRADES, URETEROSCOPY, INSERT STENT;  Surgeon: Artemio Valenzuela MD;  Location: [facility]     COMBINED CYSTOSCOPY, RETROGRADES, URETEROSCOPY, LASER HOLMIUM LITHOTRIPSY URETER(S), INSERT STENT Left 10/3/2016    Procedure: COMBINED CYSTOSCOPY, RETROGRADES, URETEROSCOPY, LASER HOLMIUM LITHOTRIPSY URETER(S), INSERT STENT;  Surgeon: Artemio Valenzuela MD;  Location: [facility]     EXTRACORPOREAL SHOCK WAVE LITHOTRIPSY (ESWL) Left 9/8/2016    Procedure: EXTRACORPOREAL SHOCK WAVE LITHOTRIPSY (ESWL);  Surgeon: Artemio Valenzuela MD;  Location: [facility] OR     INCISION AND DRAINAGE FOOT, COMBINED Right 7/24/2022    Procedure: Incision and drainage, right foot ;  Surgeon: Valentino Molina DPM;  Location: [facility]     RECESSION GASTROCNEMIUS Right 2/1/2016    Procedure: RECESSION GASTROCNEMIUS;  Surgeon: Rachelle Manriquez DPM, Pod;  [facility]: [facility]        MEDICATIONS:  Reviewed in Epic.     ALLERGIES:    Allergies   Allergen Reactions     Bactrim [Sulfamethoxazole W/Trimethoprim] Nausea and Vomiting     Sulfa Drugs Nausea     Niacin Swelling     SIMCOR caused edema in extremities     Simvastatin Swelling     SIMCOR caused edema in extremities        SOCIAL HISTORY:   Social  History     Socioeconomic History     Marital status:      Spouse name: Sydney     Number of children: 2     Years of education: Not on file     Highest education level: Master's degree (e.g., MA, MS, Arleth, MEd, MSW, ELIANA)   Occupational History     Occupation: marketing     Employer: [employer]     Comment: [employer]   Tobacco Use     Smoking status: Never     Smokeless tobacco: Never   Vaping Use     Vaping Use: Never used   Substance and Sexual Activity     Alcohol use: Yes     Alcohol/week: 0.0 standard drinks     Comment: rare---red wine 3x per month     Drug use: No     Sexual activity: Not Currently     Partners: Female   Other Topics Concern     Parent/sibling w/ CABG, MI or angioplasty before 65F 55M? No   Social History Narrative     Not on file     Social Determinants of Health     Financial Resource Strain: Low Risk      Difficulty of Paying Living Expenses: Not very hard   Food Insecurity: No Food Insecurity     Worried About Running Out of Food in the Last Year: Never true     Ran Out of Food in the Last Year: Never true   Transportation Needs: No Transportation Needs     Lack of Transportation (Medical): No     Lack of Transportation (Non-Medical): No   Physical Activity: Sufficiently Active     Days of Exercise per Week: 4 days     Minutes of Exercise per Session: 40 min   Stress: No Stress Concern Present     Feeling of Stress : Not at all   Social Connections: Moderately Isolated     Frequency of Communication with Friends and Family: Once a week     Frequency of Social Gatherings with Friends and Family: Never     Attends Sabianism Services: More than 4 times per year     Active Member of Clubs or Organizations: No     Attends Club or Organization Meetings: Not on file     Marital Status:    Intimate Partner Violence: Not on file   Housing Stability: Low Risk      Unable to Pay for Housing in the Last Year: No     Number of Places Lived in the Last Year: 2     Unstable Housing in the  Last Year: No        FAMILY HISTORY:   Family History   Problem Relation Age of Onset     Arthritis Mother         SLE     Connective Tissue Disorder Mother         lupus     Diabetes Mother      Cerebrovascular Disease Mother      Cancer Mother      Diabetes Father      Diabetes Maternal Grandfather      Diabetes Sister         EXAM:Vitals: BP (!) 142/78   Wt 103 kg (227 lb)   BMI 31.66 kg/m    BMI= Body mass index is 31.66 kg/m .      General appearance: Patient is alert and fully cooperative with history & exam.  No sign of distress is noted during the visit.      Respiratory: Breathing is regular & unlabored while sitting.      HEENT: Hearing is intact to spoken word.  Speech is clear.  No gross evidence of visual impairment that would impact ambulation.      Dermatologic:  -Submet 2 right foot ulcer now to subcutaneous tissue measures 1.5 cm x 1 cm x .2 cm. Granular wound bed. No cellulitis.   Left foot submet 3 callus/preuclerative lesion, debrided to level of dermis. No open lesions or cellulitis.      Vascular: Dorsalis pedis and posterior tibial pulses are intact & regular bilaterally.  CFT and skin temperature is normal to both lower extremities.       Neurologic: Lower extremity sensation is diminished, bilateral foot, to light touch.  No evidence of neurological-based weakness or contracture in the lower extremities.       Musculoskeletal: Patient is ambulatory without an assistive device or brace.  S/p right first ray resection. Right 2nd digit hammertoe deformity.   S/p left hallux and second digit amputations     Psychiatric: Affect is pleasant & appropriate.      PROCEDURE:   Verbal consent was obtained for debridement. A 15 blade was used to excise the nonivable tissue to the right foot ulcer. Debrided down to level of subcutaneous tissue.  No noted purulence afterwards. Debrided sites measure 3 cm x 2 cm x .1 cm on the right foot. No probe to bone. Well tolerated. Site was cleansed with alcohol.      Imaging:   I personally reviewed the images and agree with the reports as stated.    IMPRESSION: Partial amputation of the great toe at the level of the  midportion of the metatarsal shaft with some heterotopic new bone  formation adjacent to the amputation site. No fracture or  osteomyelitis. Partial amputation of the fourth toe at the level of  the PIP joint. Dorsal dislocation of the proximal phalanx of the  second toe.                                                                   IMPRESSION: Partial amputation of the first and second toes at the  level of the MTP joints. No fracture or osteomyelitis. No degenerative  changes.       ASSESSMENT:  1. Right foot submet two ulcer s/p first ray resection    2. Left foot submet two ulcer  --> preulcerative --> remain stable.      MEDICAL DECISION MAKING:   -Right foot now with open ulcer following significant ambulation. This was a high risk site following first ray resection and due to hammered deformity of second toe     -Discussed need to offload site to heal it. This would include either using air cast again at all times while doing significant walking or surgical offloading. Somewhat difficult as patient likes to be very active and has several vacations planned over the next few months. He's going out of town over [place].     -Presented options are continued wound care with significant offloading, using air cast all the time (difficult because he's going golfing and to [place] over [place].) Vs floating metatarsal head osteotomy with hammertoe tendon lengthening and possible full hammertoe repair to offload it from metatarsal head. This would require him to be less active for 3-4 weeks which he admits would be difficult. He'd prefer to have a procedure done though rather than risk ulcer get worse while he's away. Risks and benefits discussed in detail to both options.    -Will try to schedule procedure for next week. Case request order placed.      -Further follow up after procedure.       Tiny Soto DPM   [place] Department of Podiatry/Foot & Ankle Surgery    Greater than 30 minutes were spent today, reviewing previous hospital records, counseling and educating the patient on the ongoing treatment plan and coordinating care.                 Again, thank you for allowing me to participate in the care of your patient.        Sincerely,        Tiny Soto DPM

## 2022-11-23 NOTE — PROGRESS NOTES
[place] PODIATRY/FOOT & ANKLE SURGERY  CLINIC NOTE    CHIEF COMPLAINT:  right foot    PATIENT HISTORY:  Crispin Rojas is a 62 year old male who follows up for right foot. States at the beginning of the month, he walked 7 miles and developed a blister to his right foot. States site started draining a lot. Followed up with podiatry approx 2 weeks ago. States since then, less drainage and has been wearing CAM boot when being more active. Denies pain to site. Denies N/F/V/C/D. States no issues to left foot.         Review of Systems:  A 10 point review of systems was performed and is positive for that noted above in the patient history.  All other areas are negative.     PAST MEDICAL HISTORY:   Past Medical History:   Diagnosis Date     Cerebral infarction (H)     2010     Chronic infection      H/O blood clots 2022     Hyperlipidemia LDL goal <70      Hypertension      Numbness and tingling      Obesity      GILA (obstructive sleep apnea) 10/22/2018    CPAP intolerant     PVD (peripheral vascular disease) (H)     s/p left partial toe amputation     Renal stone      Tremor      Type 2 diabetes mellitus with diabetic polyneuropathy, with long-term current use of insulin (H)         PAST SURGICAL HISTORY:   Past Surgical History:   Procedure Laterality Date     AMPUTATE FOOT Left 8/18/2016    Procedure: AMPUTATE FOOT;  Surgeon: Jack Younger DPM;  [facility]     AMPUTATE TOE(S) Right 2/1/2016    Procedure: AMPUTATE TOE(S);  Surgeon: Rachelle Manriquez DPM, Pod;  [facility]: [facility]     AMPUTATE TOE(S) Right 8/2/2019    Procedure: Right fourth toe partial amputation for treatment of osteomyelitis.;  Surgeon: Jack Younger DPM;  [facility]: [facility]     AMPUTATE TOE(S) Left 11/8/2019    Procedure: Left second toe amputation at the metatarsophalangeal joint.;  Surgeon: Rachelle Manriquez DPM, Podiatry/Foot and Ankle Surgery;  Location: [facility] OR     AMPUTATE TOE(S) Right 6/5/2022    Procedure: AMPUTATION OF RIGHT GREAT  TOE;  Surgeon: Wili Quiles DPM;  Location: [facility] OR     AMPUTATE TOE(S) Right 7/28/2022    Procedure: Right foot partial first metatarsal resection;  Surgeon: Tiny Soto DPM;  Location: [facility] OR     ANGIOGRAM       BIOPSY BONE FOOT Right 7/24/2022    Procedure: Bone biopsy, right first metatarsal.;  Surgeon: Valentino Molina DPM;  Location: [facility]     COLONOSCOPY       COMBINED CYSTOSCOPY, RETROGRADES, URETEROSCOPY, INSERT STENT Left 8/21/2016    Procedure: COMBINED CYSTOSCOPY, RETROGRADES, URETEROSCOPY, INSERT STENT;  Surgeon: Artemio Valenzuela MD;  [facility]: [facility]     COMBINED CYSTOSCOPY, RETROGRADES, URETEROSCOPY, LASER HOLMIUM LITHOTRIPSY URETER(S), INSERT STENT Left 10/3/2016    Procedure: COMBINED CYSTOSCOPY, RETROGRADES, URETEROSCOPY, LASER HOLMIUM LITHOTRIPSY URETER(S), INSERT STENT;  Surgeon: Artemio Valenzuela MD;  Location: [facility] OR     EXTRACORPOREAL SHOCK WAVE LITHOTRIPSY (ESWL) Left 9/8/2016    Procedure: EXTRACORPOREAL SHOCK WAVE LITHOTRIPSY (ESWL);  Surgeon: Artemio Valenzuela MD;  Location: [facility] OR     INCISION AND DRAINAGE FOOT, COMBINED Right 7/24/2022    Procedure: Incision and drainage, right foot ;  Surgeon: Valentino Molina DPM;  Location: [facility]     RECESSION GASTROCNEMIUS Right 2/1/2016    Procedure: RECESSION GASTROCNEMIUS;  Surgeon: Rachelle Manriquez DPM, Pod;  Location: [facility] OR        MEDICATIONS:  Reviewed in Epic.     ALLERGIES:    Allergies   Allergen Reactions     Bactrim [Sulfamethoxazole W/Trimethoprim] Nausea and Vomiting     Sulfa Drugs Nausea     Niacin Swelling     SIMCOR caused edema in extremities     Simvastatin Swelling     SIMCOR caused edema in extremities        SOCIAL HISTORY:   Social History     Socioeconomic History     Marital status:      Spouse name: Sydney     Number of children: 2     Years of education: Not on file     Highest education level: Master's degree (e.g., MA, MS, Arleth, MEd, MSW, ELIANA)   Occupational History     Occupation:  marketing     Employer: [id number]     Comment: [employer]   Tobacco Use     Smoking status: Never     Smokeless tobacco: Never   Vaping Use     Vaping Use: Never used   Substance and Sexual Activity     Alcohol use: Yes     Alcohol/week: 0.0 standard drinks     Comment: rare---red wine 3x per month     Drug use: No     Sexual activity: Not Currently     Partners: Female   Other Topics Concern     Parent/sibling w/ CABG, MI or angioplasty before 65F 55M? No   Social History Narrative     Not on file     Social Determinants of Health     Financial Resource Strain: Low Risk      Difficulty of Paying Living Expenses: Not very hard   Food Insecurity: No Food Insecurity     Worried About Running Out of Food in the Last Year: Never true     Ran Out of Food in the Last Year: Never true   Transportation Needs: No Transportation Needs     Lack of Transportation (Medical): No     Lack of Transportation (Non-Medical): No   Physical Activity: Sufficiently Active     Days of Exercise per Week: 4 days     Minutes of Exercise per Session: 40 min   Stress: No Stress Concern Present     Feeling of Stress : Not at all   Social Connections: Moderately Isolated     Frequency of Communication with Friends and Family: Once a week     Frequency of Social Gatherings with Friends and Family: Never     Attends Baptism Services: More than 4 times per year     Active Member of Clubs or Organizations: No     Attends Club or Organization Meetings: Not on file     Marital Status:    Intimate Partner Violence: Not on file   Housing Stability: Low Risk      Unable to Pay for Housing in the Last Year: No     Number of Places Lived in the Last Year: 2     Unstable Housing in the Last Year: No        FAMILY HISTORY:   Family History   Problem Relation Age of Onset     Arthritis Mother         SLE     Connective Tissue Disorder Mother         lupus     Diabetes Mother      Cerebrovascular Disease Mother      Cancer Mother      Diabetes Father       Diabetes Maternal Grandfather      Diabetes Sister         EXAM:Vitals: BP (!) 142/78   Wt 103 kg (227 lb)   BMI 31.66 kg/m    BMI= Body mass index is 31.66 kg/m .      General appearance: Patient is alert and fully cooperative with history & exam.  No sign of distress is noted during the visit.      Respiratory: Breathing is regular & unlabored while sitting.      HEENT: Hearing is intact to spoken word.  Speech is clear.  No gross evidence of visual impairment that would impact ambulation.      Dermatologic:  -Submet 2 right foot ulcer now to subcutaneous tissue measures 1.5 cm x 1 cm x .2 cm. Granular wound bed. No cellulitis.   Left foot submet 3 callus/preuclerative lesion, debrided to level of dermis. No open lesions or cellulitis.      Vascular: Dorsalis pedis and posterior tibial pulses are intact & regular bilaterally.  CFT and skin temperature is normal to both lower extremities.       Neurologic: Lower extremity sensation is diminished, bilateral foot, to light touch.  No evidence of neurological-based weakness or contracture in the lower extremities.       Musculoskeletal: Patient is ambulatory without an assistive device or brace.  S/p right first ray resection. Right 2nd digit hammertoe deformity.   S/p left hallux and second digit amputations     Psychiatric: Affect is pleasant & appropriate.      PROCEDURE:   Verbal consent was obtained for debridement. A 15 blade was used to excise the nonivable tissue to the right foot ulcer. Debrided down to level of subcutaneous tissue.  No noted purulence afterwards. Debrided sites measure 3 cm x 2 cm x .1 cm on the right foot. No probe to bone. Well tolerated. Site was cleansed with alcohol.     Imaging:   I personally reviewed the images and agree with the reports as stated.    IMPRESSION: Partial amputation of the great toe at the level of the  midportion of the metatarsal shaft with some heterotopic new bone  formation adjacent to the amputation site.  No fracture or  osteomyelitis. Partial amputation of the fourth toe at the level of  the PIP joint. Dorsal dislocation of the proximal phalanx of the  second toe.                                                                   IMPRESSION: Partial amputation of the first and second toes at the  level of the MTP joints. No fracture or osteomyelitis. No degenerative  changes.       ASSESSMENT:  1. Right foot submet two ulcer s/p first ray resection    2. Left foot submet two ulcer  --> preulcerative --> remain stable.      MEDICAL DECISION MAKING:   -Right foot now with open ulcer following significant ambulation. This was a high risk site following first ray resection and due to hammered deformity of second toe     -Discussed need to offload site to heal it. This would include either using air cast again at all times while doing significant walking or surgical offloading. Somewhat difficult as patient likes to be very active and has several vacations planned over the next few months. He's going out of town over [place].     -Presented options are continued wound care with significant offloading, using air cast all the time (difficult because he's going golfing and to [employer] over [place].) Vs floating metatarsal head osteotomy with hammertoe tendon lengthening and possible full hammertoe repair to offload it from metatarsal head. This would require him to be less active for 3-4 weeks which he admits would be difficult. He'd prefer to have a procedure done though rather than risk ulcer get worse while he's away. Risks and benefits discussed in detail to both options.    -Will try to schedule procedure for next week. Case request order placed.     -Further follow up after procedure.       Tiny Soto DPM   [place] Department of Podiatry/Foot & Ankle Surgery    Greater than 30 minutes were spent today, reviewing previous hospital records, counseling and educating the patient on the ongoing treatment plan  and coordinating care.

## 2022-11-25 ENCOUNTER — TELEPHONE (OUTPATIENT)
Dept: PODIATRY | Facility: CLINIC | Age: 62
End: 2022-11-25

## 2022-11-25 NOTE — TELEPHONE ENCOUNTER
Per Gay I checked and did not see any time on 12/1/22 open.   There was time on 11/28 at [facility] and 11/30 at [facility].I asked Pat to call for a preop physical asap and go from there.   Time requested on 11/30 for now.

## 2022-11-25 NOTE — TELEPHONE ENCOUNTER
Surgery scheduled for 11/30 at [facility] at 2PM start time.    PO appt on 12/7.    He has a preop on 11/28.

## 2022-11-28 ENCOUNTER — TELEPHONE (OUTPATIENT)
Dept: FAMILY MEDICINE | Facility: CLINIC | Age: 62
End: 2022-11-28

## 2022-11-28 NOTE — TELEPHONE ENCOUNTER
Called and, relayed message below. Pt verbalized understanding and agreeable to plan. Pt would also like to know how much, if any, insulin degludec (TRESIBA FLEXTOUCH) 100 UNIT/ML pen should take. Huddled with covering provider, advised pt to take 80% of usual dose the night before, none the day of. Relayed message to pt, verbalized understanding and agreeable to plan. No further questions.     Stacy BURT RN

## 2022-11-28 NOTE — TELEPHONE ENCOUNTER
Pat is calling today says he has surgery scheduled on Wednesday. Says in past Eliquis and asa has been held previously. Is having foot surgery, bone surgery.     He says when scheduled pre-op physical tomorrow, but this may be too late to stop if waits until then.     Routing to provider of day to further advise as PCP not in office.     I told Pat if he has not heard back from us today by 5 pm to please call back so we can follow up.     Patient in agreement with plan.   Teresa Lakhani R.N.

## 2022-11-28 NOTE — OR NURSING
Pt instructed to contact primary care provider today for medication instructions (including anticoagulant and diabetic medications), medications to be held prior to surgery and what to take the day of surgery.  Patient verbalized understanding.

## 2022-11-29 ENCOUNTER — OFFICE VISIT (OUTPATIENT)
Dept: FAMILY MEDICINE | Facility: CLINIC | Age: 62
End: 2022-11-29
Payer: COMMERCIAL

## 2022-11-29 VITALS
OXYGEN SATURATION: 93 % | WEIGHT: 234 LBS | BODY MASS INDEX: 32.64 KG/M2 | SYSTOLIC BLOOD PRESSURE: 118 MMHG | TEMPERATURE: 97.9 F | DIASTOLIC BLOOD PRESSURE: 69 MMHG | RESPIRATION RATE: 18 BRPM | HEART RATE: 65 BPM

## 2022-11-29 DIAGNOSIS — Z89.411 STATUS POST AMPUTATION OF RIGHT GREAT TOE (H): ICD-10-CM

## 2022-11-29 DIAGNOSIS — Z01.818 PREOP GENERAL PHYSICAL EXAM: Primary | ICD-10-CM

## 2022-11-29 DIAGNOSIS — E11.42 TYPE 2 DIABETES MELLITUS WITH PERIPHERAL NEUROPATHY (H): ICD-10-CM

## 2022-11-29 DIAGNOSIS — K74.60 CIRRHOSIS OF LIVER WITHOUT ASCITES, UNSPECIFIED HEPATIC CIRRHOSIS TYPE (H): ICD-10-CM

## 2022-11-29 DIAGNOSIS — D69.6 THROMBOCYTOPENIA (H): ICD-10-CM

## 2022-11-29 DIAGNOSIS — Z86.718 PERSONAL HISTORY OF DVT (DEEP VEIN THROMBOSIS): ICD-10-CM

## 2022-11-29 DIAGNOSIS — L97.512 ULCER OF RIGHT FOOT WITH FAT LAYER EXPOSED (H): ICD-10-CM

## 2022-11-29 LAB
ANION GAP SERPL CALCULATED.3IONS-SCNC: 12 MMOL/L (ref 7–15)
BASOPHILS # BLD AUTO: 0 10E3/UL (ref 0–0.2)
BASOPHILS NFR BLD AUTO: 1 %
BUN SERPL-MCNC: 27.1 MG/DL (ref 8–23)
CALCIUM SERPL-MCNC: 9.3 MG/DL (ref 8.8–10.2)
CHLORIDE SERPL-SCNC: 103 MMOL/L (ref 98–107)
CREAT SERPL-MCNC: 0.85 MG/DL (ref 0.67–1.17)
DEPRECATED HCO3 PLAS-SCNC: 24 MMOL/L (ref 22–29)
EOSINOPHIL # BLD AUTO: 0.3 10E3/UL (ref 0–0.7)
EOSINOPHIL NFR BLD AUTO: 6 %
ERYTHROCYTE [DISTWIDTH] IN BLOOD BY AUTOMATED COUNT: 13.8 % (ref 10–15)
GFR SERPL CREATININE-BSD FRML MDRD: >90 ML/MIN/1.73M2
GLUCOSE SERPL-MCNC: 107 MG/DL (ref 70–99)
HBA1C MFR BLD: 5.6 % (ref 0–5.6)
HCT VFR BLD AUTO: 36.7 % (ref 40–53)
HGB BLD-MCNC: 11.7 G/DL (ref 13.3–17.7)
LYMPHOCYTES # BLD AUTO: 1.5 10E3/UL (ref 0.8–5.3)
LYMPHOCYTES NFR BLD AUTO: 27 %
MCH RBC QN AUTO: 28.7 PG (ref 26.5–33)
MCHC RBC AUTO-ENTMCNC: 31.9 G/DL (ref 31.5–36.5)
MCV RBC AUTO: 90 FL (ref 78–100)
MONOCYTES # BLD AUTO: 0.5 10E3/UL (ref 0–1.3)
MONOCYTES NFR BLD AUTO: 9 %
NEUTROPHILS # BLD AUTO: 3.3 10E3/UL (ref 1.6–8.3)
NEUTROPHILS NFR BLD AUTO: 58 %
PLATELET # BLD AUTO: 147 10E3/UL (ref 150–450)
POTASSIUM SERPL-SCNC: 4.4 MMOL/L (ref 3.4–5.3)
RBC # BLD AUTO: 4.08 10E6/UL (ref 4.4–5.9)
SARS-COV-2 RNA RESP QL NAA+PROBE: NEGATIVE
SODIUM SERPL-SCNC: 139 MMOL/L (ref 136–145)
WBC # BLD AUTO: 5.6 10E3/UL (ref 4–11)

## 2022-11-29 PROCEDURE — 93000 ELECTROCARDIOGRAM COMPLETE: CPT | Performed by: PHYSICIAN ASSISTANT

## 2022-11-29 PROCEDURE — U0003 INFECTIOUS AGENT DETECTION BY NUCLEIC ACID (DNA OR RNA); SEVERE ACUTE RESPIRATORY SYNDROME CORONAVIRUS 2 (SARS-COV-2) (CORONAVIRUS DISEASE [COVID-19]), AMPLIFIED PROBE TECHNIQUE, MAKING USE OF HIGH THROUGHPUT TECHNOLOGIES AS DESCRIBED BY CMS-2020-01-R: HCPCS | Performed by: PHYSICIAN ASSISTANT

## 2022-11-29 PROCEDURE — U0005 INFEC AGEN DETEC AMPLI PROBE: HCPCS | Performed by: PHYSICIAN ASSISTANT

## 2022-11-29 PROCEDURE — 36415 COLL VENOUS BLD VENIPUNCTURE: CPT | Performed by: PHYSICIAN ASSISTANT

## 2022-11-29 PROCEDURE — 83036 HEMOGLOBIN GLYCOSYLATED A1C: CPT | Performed by: PHYSICIAN ASSISTANT

## 2022-11-29 PROCEDURE — 80048 BASIC METABOLIC PNL TOTAL CA: CPT | Performed by: PHYSICIAN ASSISTANT

## 2022-11-29 PROCEDURE — 99214 OFFICE O/P EST MOD 30 MIN: CPT | Performed by: PHYSICIAN ASSISTANT

## 2022-11-29 PROCEDURE — 85025 COMPLETE CBC W/AUTO DIFF WBC: CPT | Performed by: PHYSICIAN ASSISTANT

## 2022-11-29 NOTE — PROGRESS NOTES
[facility]  [address], [address]  [facility] [address]  Phone: [phone]  Primary Provider: Johann Serna  Pre-op Performing Provider: DAYANARA KAT      PREOPERATIVE EVALUATION:  Today's date: 11/29/2022    Crispin Rojas is a 62 year old male who presents for a preoperative evaluation.    Surgical Information:  Surgery/Procedure: Right second metatarsal osteotomy, second digit hammertoe repair  Surgery Location [facility] OR:   Surgeon: Tiny Soto DPM  Surgery Date: 11/30/2022  Time of Surgery: 2:05 PM   Where patient plans to recover: At home with family  Fax number for surgical facility: Note does not need to be faxed, will be available electronically in Epic.    Type of Anesthesia Anticipated:  MAC    Assessment & Plan     The proposed surgical procedure is considered INTERMEDIATE risk.    Preop general physical exam    - CBC with platelets and differential; Future  - Basic metabolic panel  (Ca, Cl, CO2, Creat, Gluc, K, Na, BUN); Future  - EKG 12-lead complete w/read - Clinics  - Asymptomatic COVID-19 Virus (Coronavirus) by PCR; Future  - CBC with platelets and differential  - Basic metabolic panel  (Ca, Cl, CO2, Creat, Gluc, K, Na, BUN)  - Asymptomatic COVID-19 Virus (Coronavirus) by PCR Nose    Ulcer of right foot with fat layer exposed (H)      Status post amputation of right great toe (H)      Type 2 diabetes mellitus with peripheral neuropathy (H)  a1c great today at 5.6  - Basic metabolic panel  (Ca, Cl, CO2, Creat, Gluc, K, Na, BUN); Future  - Hemoglobin A1c; Future  - EKG 12-lead complete w/read - Clinics  - Basic metabolic panel  (Ca, Cl, CO2, Creat, Gluc, K, Na, BUN)  - Hemoglobin A1c    Thrombocytopenia (H)  Stable, most likely from underlying liver disease    Cirrhosis of liver without ascites, unspecified hepatic cirrhosis type (H)  Stable, follows with GI    Personal history of DVT (deep vein thrombosis)  Holding eliquis per PCP              Risks and Recommendations:  The patient has the following additional risks and recommendations for perioperative complications:   - No identified additional risk factors other than previously addressed    Medication Instructions:  Patient is to take all scheduled medications on the day of surgery EXCEPT for modifications listed below:   - apixaban (Eliquis), edoxaban (Savaysa), rivaroxaban (Xarelto): Bleeding risk is moderate or high for this procedure AND CrCl  (>=) 50 mL/min. HOLD 2 days before surgery.    - Long acting insulin (e.g. glargine, detemir): Take 80% of the usual evening or morning dose before surgery.          - metformin: HOLD day of surgery.    RECOMMENDATION:  APPROVAL GIVEN to proceed with proposed procedure, without further diagnostic evaluation.        Subjective     HPI related to upcoming procedure: 63 y/o new to me male here for pre op exam.  Significant past medical history, most recently struggling with chronic ulcerations on foot with osteo.  Due to deformity from amputation, higher risk of continual ulcers.   Planning surgical correction.    Has been feeling well lately, no concerns.  Still very active.  Patient has had surgery in past and has never had any issues with anaesthesia, bleeding or blood clots.     Preop Questions 11/29/2022   1. Have you ever had a heart attack or stroke? YES -    2. Have you ever had surgery on your heart or blood vessels, such as a stent placement, a coronary artery bypass, or surgery on an artery in your head, neck, heart, or legs? No   3. Do you have chest pain with activity? No   4. Do you have a history of  heart failure? No   5. Do you currently have a cold, bronchitis or symptoms of other infection? No   6. Do you have a cough, shortness of breath, or wheezing? No   7. Do you or anyone in your family have previous history of blood clots? YES - on Eliquis   8. Do you or does anyone in your family have a serious bleeding problem such as prolonged  bleeding following surgeries or cuts? No   9. Have you ever had problems with anemia or been told to take iron pills? No   10. Have you had any abnormal blood loss such as black, tarry or bloody stools? No   11. Have you ever had a blood transfusion? No   12. Are you willing to have a blood transfusion if it is medically needed before, during, or after your surgery? Yes   13. Have you or any of your relatives ever had problems with anesthesia? No   14. Do you have sleep apnea, excessive snoring or daytime drowsiness? No   15. Do you have any artifical heart valves or other implanted medical devices like a pacemaker, defibrillator, or continuous glucose monitor? No   16. Do you have artificial joints? No   17. Are you allergic to latex? No       Health Care Directive:  Patient does not have a Health Care Directive or Living Will: Advance Directive received and scanned. Click on Code in the patient header to view.    Preoperative Review of :   reviewed - no record of controlled substances prescribed.      Status of Chronic Conditions:  DIABETES - Patient has a longstanding history of DiabetesType Type II . Patient is being treated with diet, oral agents, exercise and insulin injections and denies significant side effects. Control has been good. Complicating factors include but are not limited to: neuropathy and foot ulcer.       Review of Systems  CONSTITUTIONAL: NEGATIVE for fever, chills, change in weight  INTEGUMENTARY/SKIN: NEGATIVE for worrisome rashes, moles or lesions  EYES: NEGATIVE for vision changes or irritation  ENT/MOUTH: NEGATIVE for ear, mouth and throat problems  RESP: NEGATIVE for significant cough or SOB  CV: NEGATIVE for chest pain, palpitations or peripheral edema  GI: NEGATIVE for nausea, abdominal pain, heartburn, or change in bowel habits  : NEGATIVE for frequency, dysuria, or hematuria  MUSCULOSKELETAL: NEGATIVE for significant arthralgias or myalgia  NEURO: NEGATIVE for weakness,  dizziness or paresthesias  ENDOCRINE: NEGATIVE for temperature intolerance, skin/hair changes  HEME: NEGATIVE for bleeding problems  PSYCHIATRIC: NEGATIVE for changes in mood or affect    Patient Active Problem List    Diagnosis Date Noted     Acute osteomyelitis of right foot (H) 07/22/2022     Priority: Medium     Abnormal CT of liver 07/08/2022     Priority: Medium     Cirrhosis of liver (H) 07/08/2022     Priority: Medium     Constipation 07/08/2022     Priority: Medium     Epigastric pain 07/08/2022     Priority: Medium     Nausea and vomiting 07/08/2022     Priority: Medium     Acute deep vein thrombosis (DVT) of tibial vein of right lower extremity (H) 07/06/2022     Priority: Medium     Osteomyelitis of great toe of right foot (H) 06/03/2022     Priority: Medium     Cervical pain 01/13/2022     Priority: Medium     Bilateral thoracic back pain 01/13/2022     Priority: Medium     Scoliosis of thoracic spine, unspecified scoliosis type 01/03/2022     Priority: Medium     PVD (peripheral vascular disease) (H) 12/31/2021     Priority: Medium     s/p left partial toe amputation       Other sequelae following unspecified cerebrovascular disease 12/02/2021     Priority: Medium     Benign neoplasm of transverse colon 11/19/2021     Priority: Medium     Diverticular disease 11/17/2021     Priority: Medium     Hemorrhoids 11/17/2021     Priority: Medium     Polyp of colon 11/17/2021     Priority: Medium     Duodenitis 10/15/2021     Priority: Medium     Diabetic ulcer of lower extremity (H) 11/07/2019     Priority: Medium     GILA (obstructive sleep apnea) 10/22/2018     Priority: Medium     HST 10/18/2018, AHI : 34.1       Right bundle branch block 06/23/2018     Priority: Medium     On ECG 6/23/2018, advised to f/u with PCP       Hyperlipidemia LDL goal <70 06/07/2018     Priority: Medium     Essential hypertension 06/07/2018     Priority: Medium     Type 2 diabetes mellitus with peripheral neuropathy (H) 11/27/2017      Priority: Medium     Chronic left shoulder pain 01/30/2017     Priority: Medium     Calculus of kidney 08/30/2016     Priority: Medium      Past Medical History:   Diagnosis Date     Cerebral infarction (H)     2010     Chronic infection      H/O blood clots 2022     Hyperlipidemia LDL goal <70      Hypertension      Numbness and tingling      Obesity      GILA (obstructive sleep apnea) 10/22/2018    CPAP intolerant     PVD (peripheral vascular disease) (H)     s/p left partial toe amputation     Renal stone      Tremor      Type 2 diabetes mellitus with diabetic polyneuropathy, with long-term current use of insulin (H)      Past Surgical History:   Procedure Laterality Date     AMPUTATE FOOT Left 8/18/2016    Procedure: AMPUTATE FOOT;  Surgeon: Jack Younger DPM;  Location: [facility] OR     AMPUTATE TOE(S) Right 2/1/2016    Procedure: AMPUTATE TOE(S);  Surgeon: Rachelle Manriquez DPM, Pod;  Location: [facility] OR     AMPUTATE TOE(S) Right 8/2/2019    Procedure: Right fourth toe partial amputation for treatment of osteomyelitis.;  Surgeon: Jack Younger DPM;  Location: [facility] OR     AMPUTATE TOE(S) Left 11/8/2019    Procedure: Left second toe amputation at the metatarsophalangeal joint.;  Surgeon: Rachelle Manriquez DPM, Podiatry/Foot and Ankle Surgery;  Location: [facility] OR     AMPUTATE TOE(S) Right 6/5/2022    Procedure: AMPUTATION OF RIGHT GREAT TOE;  Surgeon: Wili Quiles DPM;  Location: [facility] OR     AMPUTATE TOE(S) Right 7/28/2022    Procedure: Right foot partial first metatarsal resection;  Surgeon: Tiny Soto DPM;  Location: [facility] OR     ANGIOGRAM       BIOPSY BONE FOOT Right 7/24/2022    Procedure: Bone biopsy, right first metatarsal.;  Surgeon: Valetnino Molina DPM;  Location: [facility] OR     COLONOSCOPY       COMBINED CYSTOSCOPY, RETROGRADES, URETEROSCOPY, INSERT STENT Left 8/21/2016    Procedure: COMBINED CYSTOSCOPY, RETROGRADES, URETEROSCOPY, INSERT STENT;  Surgeon: Artemio Valenzuela MD;  Location: [facility]  OR     COMBINED CYSTOSCOPY, RETROGRADES, URETEROSCOPY, LASER HOLMIUM LITHOTRIPSY URETER(S), INSERT STENT Left 10/3/2016    Procedure: COMBINED CYSTOSCOPY, RETROGRADES, URETEROSCOPY, LASER HOLMIUM LITHOTRIPSY URETER(S), INSERT STENT;  Surgeon: Artemio Valenzuela MD;  Location: [facility] OR     EXTRACORPOREAL SHOCK WAVE LITHOTRIPSY (ESWL) Left 9/8/2016    Procedure: EXTRACORPOREAL SHOCK WAVE LITHOTRIPSY (ESWL);  Surgeon: Artemio Valenzuela MD;  Location: [facility] OR     INCISION AND DRAINAGE FOOT, COMBINED Right 7/24/2022    Procedure: Incision and drainage, right foot ;  Surgeon: Valentino Molina DPM;  Location: [facility] OR     RECESSION GASTROCNEMIUS Right 2/1/2016    Procedure: RECESSION GASTROCNEMIUS;  Surgeon: Rachelle Manriquez DPM, Pod;  Location: [facility] OR     Current Outpatient Medications   Medication Sig Dispense Refill     acetaminophen (TYLENOL) 325 MG tablet Take 2 tablets (650 mg) by mouth every 6 hours as needed for mild pain or other (and adjunct with moderate or severe pain or per patient request)       amLODIPine (NORVASC) 5 MG tablet Take 1 tablet (5 mg) by mouth 2 times daily Take one tablet twice daily  SEE MD THIS QUARTER 180 tablet 3     aspirin (ASA) 81 MG EC tablet Take 1 tablet (81 mg) by mouth daily       atorvastatin (LIPITOR) 40 MG tablet Take 1 tablet (40 mg) by mouth daily Take one tablet daily SEE PROVIDER FOR NEXT REFILL 90 tablet 1     blood glucose (NO BRAND SPECIFIED) lancets standard Use to test blood sugar 3 times daily or as directed. 300 each 3     calcium carbonate (OS-NARA) 500 MG tablet Take 1 tablet by mouth 2 times daily       cephALEXin (KEFLEX) 500 MG capsule TAKE 1 CAPSULE BY MOUTH THREE TIMES DAILY X 3 MONTHS       Cholecalciferol (VITAMIN D3) 25 MCG (1000 UT) CAPS Take 1,000 Units by mouth daily        Co-Enzyme Q10 100 MG CAPS Take 100 mg by mouth daily        collagenase (SANTYL) 250 UNIT/GM external ointment Apply topically daily 30 g 1     Continuous Blood Gluc Sensor  (FREESTYLE LUCERO 14 DAY SENSOR) [facility] USE ONE EVERY 14 DAYS 6 each 3     ELIQUIS ANTICOAGULANT 5 MG tablet Take 1 tablet (5 mg) by mouth 2 times daily 60 tablet 2     furosemide (LASIX) 20 MG tablet Take 1 tablet (20 mg) by mouth daily as needed (for leg swelling) As needed for edema 30 tablet 3     gabapentin (NEURONTIN) 100 MG capsule Take 400 mg by mouth 3 times daily       hydrochlorothiazide (MICROZIDE) 12.5 MG capsule Take 1 capsule (12.5 mg) by mouth every morning 90 capsule 2     insulin aspart (NOVOLOG PEN) 100 UNIT/ML pen Use daily with meals, up to 70 units a day (Patient taking differently: Inject 15-25 Units Subcutaneous 3 times daily (with meals) Use daily with meals, up to 70 units a day) 30 mL 11     insulin degludec (TRESIBA FLEXTOUCH) 100 UNIT/ML pen 45-55 units subcutaneous daily.  Resume as your diet improves.  Start with 1/3 of your home dose and titrate up to your normal home dose as your diet improves and blood sugars remain normal. 60 mL 1     insulin pen needle (B-D U/F) 31G X 5 MM miscellaneous USE TO INJECT LANTUS TWICE DAILY AND NOVOLOG THREE TIMES DAILY AS DIRECTED 500 each 0     ketoconazole (NIZORAL) 2 % external shampoo APPLY TOPICALLY DAILY AS NEEDED FOR ITCHING OR IRRITATION. SEE MD AFTER 6 WEEKS 120 mL 3     lisinopril (ZESTRIL) 40 MG tablet Take 1 tablet (40 mg) by mouth daily 100 tablet 5     MAGNESIUM GLYCINATE PLUS PO Take 400 mg by mouth 2 times daily       metFORMIN (GLUCOPHAGE XR) 500 MG 24 hr tablet Take two tablets by mouth twice daily 360 tablet 1     Multiple Vitamins-Minerals (MULTIVITAMIN PO) Take 1 tablet by mouth daily        Omega-3 Fatty Acids (OMEGA-3 FISH OIL PO) Take 1 g by mouth 2 times daily (with meals)        omeprazole (PRILOSEC) 40 MG DR capsule Take 40 mg by mouth daily       OZEMPIC, 1 MG/DOSE, 4 MG/3ML SOPN INJECT 1 MG SUBCUTANEOUSLY AS DIRECTED EVERY 7 DAYS ON WEDNESDAYS 9 mL 0     potassium chloride ER (KLOR-CON M) 10 MEQ CR tablet Take 1 tablet (10  mEq) by mouth 2 times daily (Patient taking differently: Take 10 mEq by mouth 2 times daily as needed (takes when taking diuretic)) 180 tablet 1     primidone (MYSOLINE) 50 MG tablet 2 times daily       tadalafil (CIALIS) 5 MG tablet TAKE 1 TABLET BY MOUTH EVERY DAY AS NEEDED one hour before intercourse 30 tablet 1       Allergies   Allergen Reactions     Hmg-Coa-R Inhibitors Swelling     Other reaction(s): Other (see comments)  SIMCOR caused edema in extremities       Bactrim [Sulfamethoxazole W/Trimethoprim] Nausea and Vomiting     Sulfa Drugs Nausea     Niacin Swelling     SIMCOR caused edema in extremities     Simvastatin Swelling     SIMCOR caused edema in extremities        Social History     Tobacco Use     Smoking status: Never     Smokeless tobacco: Never   Substance Use Topics     Alcohol use: Yes     Alcohol/week: 0.0 standard drinks     Comment: rare---red wine 3x per month       History   Drug Use No         Objective     /69 (BP Location: Right arm, Patient Position: Sitting, Cuff Size: Adult Large)   Pulse 65   Temp 97.9  F (36.6  C) (Temporal)   Resp 18   Wt 106.1 kg (234 lb)   SpO2 93%   BMI 32.64 kg/m      Physical Exam    GENERAL APPEARANCE: alert and active     EYES: Eyes grossly normal to inspection     HENT: ear canals and TM's normal     NECK: no adenopathy, no asymmetry, masses, or scars and thyroid normal to palpation     RESP: lungs clear to auscultation - no rales, rhonchi or wheezes     CV: regular rates and rhythm, normal S1 S2, no S3 or S4 and no murmur, click or rub     NEURO: Normal strength and tone, sensory exam grossly normal, mentation intact and speech normal     PSYCH: mentation appears normal. and affect normal/bright    Recent Labs   Lab Test 09/12/22  1232 09/08/22  1215 08/25/22  1315 08/22/22  1500 06/03/22  2204 06/03/22  1203 01/03/22  0952   HGB 10.8* 11.5*   < > 11.7*   < > 11.7*  --    * 170   < > 145*   < > 143*  --      --   --  140   < > 140  140   POTASSIUM 4.1  --   --  4.3   < > 4.3 4.2   CR 0.80 0.88   < > 0.80  0.80   < > 0.97 0.87   A1C  --   --   --   --   --  6.6* 6.1*    < > = values in this interval not displayed.        Diagnostics:  Labs pending at this time.  Results will be reviewed when available.   EKG required for peripheral arterial disease and not completed in the last 90 days.   EKG results-sinus rhythm, chronic RBBB and left fasicular block unchanged from previous    Revised Cardiac Risk Index (RCRI):  The patient has the following serious cardiovascular risks for perioperative complications:   - Diabetes Mellitus (on Insulin) = 1 point     RCRI Interpretation: 1 point: Class II (low risk - 0.9% complication rate)           Signed Electronically by: Aden Ruiz PA-C  Copy of this evaluation report is provided to requesting physician.

## 2022-11-29 NOTE — PATIENT INSTRUCTIONS
Preparing for Your Surgery  Getting started  A nurse will call you to review your health history and instructions. They will give you an arrival time based on your scheduled surgery time. Please be ready to share:    Your doctor s clinic name and phone number    Your medical, surgical, and anesthesia history    A list of allergies and sensitivities    A list of medicines, including herbal treatments and over-the-counter drugs    Whether the patient has a legal guardian (ask how to send us the papers in advance)  Please tell us if you re pregnant--or if there s any chance you might be pregnant. Some surgeries may injure a fetus (unborn baby), so they require a pregnancy test. Surgeries that are safe for a fetus don t always need a test, and you can choose whether to have one.   If you have a child who s having surgery, please ask for a copy of Preparing for Your Child s Surgery.    Preparing for surgery    Within 10 to 30 days of surgery: Have a pre-op exam (sometimes called an H&P, or History and Physical). This can be done at a clinic or pre-operative center.  ? If you re having a , you may not need this exam. Talk to your care team.    At your pre-op exam, talk to your care team about all medicines you take. If you need to stop any medicines before surgery, ask when to start taking them again.  ? We do this for your safety. Many medicines can make you bleed too much during surgery. Some change how well surgery (anesthesia) drugs work.    Call your insurance company to let them know you re having surgery. (If you don t have insurance, call [phone].)    Call your clinic if there s any change in your health. This includes signs of a cold or flu (sore throat, runny nose, cough, rash, fever). It also includes a scrape or scratch near the surgery site.    If you have questions on the day of surgery, call your hospital or surgery center.  COVID testing  You may need to be tested for COVID-19 before having  surgery. If so, we will give you instructions (or click here).  Eating and drinking guidelines  For your safety: Unless your surgeon tells you otherwise, follow the guidelines below.    Eat and drink as usual until 8 hours before you arrive for surgery. After that, no food or milk.    Drink clear liquids until 2 hours before you arrive. These are liquids you can see through, like water, Gatorade, and Propel Water. They also include plain black coffee and tea (no cream or milk), candy, and breath mints. You can spit out gum when you arrive.    If you drink alcohol: Stop drinking it the night before surgery.    If your care team tells you to take medicine on the morning of surgery, it s okay to take it with a sip of water.  Preventing infection    Shower or bathe the night before and morning of your surgery. Follow the instructions your clinic gave you. (If no instructions, use regular soap.)    Don t shave or clip hair near your surgery site. We ll remove the hair if needed.    Don t smoke or vape the morning of surgery. You may chew nicotine gum up to 2 hours before surgery. A nicotine patch is okay.  ? Note: Some surgeries require you to completely quit smoking and nicotine. Check with your surgeon.    Your care team will make every effort to keep you safe from infection. We will:  ? Clean our hands often with soap and water (or an alcohol-based hand rub).  ? Clean the skin at your surgery site with a special soap that kills germs.  ? Give you a special gown to keep you warm. (Cold raises the risk of infection.)  ? Wear special hair covers, masks, gowns and gloves during surgery.  ? Give antibiotic medicine, if prescribed. Not all surgeries need antibiotics.  What to bring on the day of surgery    Photo ID and insurance card    Copy of your health care directive, if you have one    Glasses and hearing aids (bring cases)  ? You can t wear contacts during surgery    Inhaler and eye drops, if you use them (tell us  about these when you arrive)    CPAP machine or breathing device, if you use them    A few personal items, if spending the night    If you have . . .  ? A pacemaker, ICD (cardiac defibrillator) or other implant: Bring the ID card.  ? An implanted stimulator: Bring the remote control.  ? A legal guardian: Bring a copy of the certified (court-stamped) guardianship papers.  Please remove any jewelry, including body piercings. Leave jewelry and other valuables at home.  If you re going home the day of surgery    You must have a responsible adult drive you home. They should stay with you overnight as well.    If you don t have someone to stay with you, and you aren t safe to go home alone, we may keep you overnight. Insurance often won t pay for this.  After surgery  If it s hard to control your pain or you need more pain medicine, please call your surgeon s office.  Questions?   If you have any questions for your care team, list them here:   ____________________________________________________________________________________________________________________________________________________________________________________________________________________________________________________________________  For informational purposes only. Not to replace the advice of your health care provider. Copyright   2003, 2019 [employer]. All rights reserved. Clinically reviewed by Jada Andrade MD. [employer] [id number] - REV 10/22.

## 2022-11-30 ENCOUNTER — ANESTHESIA EVENT (OUTPATIENT)
Dept: SURGERY | Facility: CLINIC | Age: 62
End: 2022-11-30
Payer: COMMERCIAL

## 2022-11-30 ENCOUNTER — HOSPITAL ENCOUNTER (OUTPATIENT)
Facility: CLINIC | Age: 62
Discharge: HOME | End: 2022-11-30
Admitting: PODIATRIST
Payer: COMMERCIAL

## 2022-11-30 ENCOUNTER — TRANSFERRED RECORDS (OUTPATIENT)
Dept: HEALTH INFORMATION MANAGEMENT | Facility: CLINIC | Age: 62
End: 2022-11-30

## 2022-11-30 ENCOUNTER — ANESTHESIA (OUTPATIENT)
Dept: SURGERY | Facility: CLINIC | Age: 62
End: 2022-11-30
Payer: COMMERCIAL

## 2022-11-30 ENCOUNTER — APPOINTMENT (OUTPATIENT)
Dept: GENERAL RADIOLOGY | Facility: CLINIC | Age: 62
End: 2022-11-30
Attending: PODIATRIST
Payer: COMMERCIAL

## 2022-11-30 ENCOUNTER — TRANSFERRED RECORDS (OUTPATIENT)
Dept: SURGERY | Facility: CLINIC | Age: 62
End: 2022-11-30

## 2022-11-30 VITALS
TEMPERATURE: 98.1 F | HEIGHT: 71 IN | WEIGHT: 234 LBS | HEART RATE: 56 BPM | BODY MASS INDEX: 32.76 KG/M2 | RESPIRATION RATE: 14 BRPM | DIASTOLIC BLOOD PRESSURE: 81 MMHG | OXYGEN SATURATION: 97 % | SYSTOLIC BLOOD PRESSURE: 140 MMHG

## 2022-11-30 DIAGNOSIS — Z98.890 S/P FOOT SURGERY, RIGHT: Primary | ICD-10-CM

## 2022-11-30 LAB
ALT SERPL-CCNC: 29 IU/L (ref 0–44)
AST SERPL-CCNC: 29 IU/L (ref 0–40)
GLUCOSE BLDC GLUCOMTR-MCNC: 90 MG/DL (ref 70–99)
GLUCOSE BLDC GLUCOMTR-MCNC: 94 MG/DL (ref 70–99)

## 2022-11-30 PROCEDURE — 370N000017 HC ANESTHESIA TECHNICAL FEE, PER MIN: Performed by: PODIATRIST

## 2022-11-30 PROCEDURE — 272N000001 HC OR GENERAL SUPPLY STERILE: Performed by: PODIATRIST

## 2022-11-30 PROCEDURE — 250N000009 HC RX 250: Performed by: NURSE ANESTHETIST, CERTIFIED REGISTERED

## 2022-11-30 PROCEDURE — 250N000011 HC RX IP 250 OP 636: Performed by: PODIATRIST

## 2022-11-30 PROCEDURE — 250N000009 HC RX 250: Performed by: PODIATRIST

## 2022-11-30 PROCEDURE — 360N000077 HC SURGERY LEVEL 4, PER MIN: Performed by: PODIATRIST

## 2022-11-30 PROCEDURE — 710N000012 HC RECOVERY PHASE 2, PER MINUTE: Performed by: PODIATRIST

## 2022-11-30 PROCEDURE — 258N000003 HC RX IP 258 OP 636: Performed by: NURSE ANESTHETIST, CERTIFIED REGISTERED

## 2022-11-30 PROCEDURE — 82962 GLUCOSE BLOOD TEST: CPT

## 2022-11-30 PROCEDURE — 258N000003 HC RX IP 258 OP 636: Performed by: ANESTHESIOLOGY

## 2022-11-30 PROCEDURE — 28308 INCISION OF METATARSAL: CPT | Mod: RT | Performed by: PODIATRIST

## 2022-11-30 PROCEDURE — 999N000065 XR FOOT PORT RIGHT 3 VIEWS: Mod: RT

## 2022-11-30 PROCEDURE — 999N000141 HC STATISTIC PRE-PROCEDURE NURSING ASSESSMENT: Performed by: PODIATRIST

## 2022-11-30 PROCEDURE — 28010 INCISION OF TOE TENDON: CPT | Mod: 59 | Performed by: PODIATRIST

## 2022-11-30 PROCEDURE — 250N000011 HC RX IP 250 OP 636: Performed by: NURSE ANESTHETIST, CERTIFIED REGISTERED

## 2022-11-30 RX ORDER — MAGNESIUM HYDROXIDE 1200 MG/15ML
LIQUID ORAL PRN
Status: DISCONTINUED | OUTPATIENT
Start: 2022-11-30 | End: 2022-11-30 | Stop reason: HOSPADM

## 2022-11-30 RX ORDER — SODIUM CHLORIDE, SODIUM LACTATE, POTASSIUM CHLORIDE, CALCIUM CHLORIDE 600; 310; 30; 20 MG/100ML; MG/100ML; MG/100ML; MG/100ML
INJECTION, SOLUTION INTRAVENOUS CONTINUOUS PRN
Status: DISCONTINUED | OUTPATIENT
Start: 2022-11-30 | End: 2022-11-30

## 2022-11-30 RX ORDER — LIDOCAINE HYDROCHLORIDE 10 MG/ML
INJECTION, SOLUTION INFILTRATION; PERINEURAL PRN
Status: DISCONTINUED | OUTPATIENT
Start: 2022-11-30 | End: 2022-11-30

## 2022-11-30 RX ORDER — PROPOFOL 10 MG/ML
INJECTION, EMULSION INTRAVENOUS CONTINUOUS PRN
Status: DISCONTINUED | OUTPATIENT
Start: 2022-11-30 | End: 2022-11-30

## 2022-11-30 RX ORDER — OXYCODONE AND ACETAMINOPHEN 5; 325 MG/1; MG/1
1 TABLET ORAL
Status: DISCONTINUED | OUTPATIENT
Start: 2022-11-30 | End: 2022-11-30 | Stop reason: HOSPADM

## 2022-11-30 RX ORDER — PROPOFOL 10 MG/ML
INJECTION, EMULSION INTRAVENOUS PRN
Status: DISCONTINUED | OUTPATIENT
Start: 2022-11-30 | End: 2022-11-30

## 2022-11-30 RX ORDER — FENTANYL CITRATE 50 UG/ML
25 INJECTION, SOLUTION INTRAMUSCULAR; INTRAVENOUS
Status: DISCONTINUED | OUTPATIENT
Start: 2022-11-30 | End: 2022-11-30 | Stop reason: HOSPADM

## 2022-11-30 RX ORDER — CEFAZOLIN SODIUM/WATER 2 G/20 ML
2 SYRINGE (ML) INTRAVENOUS SEE ADMIN INSTRUCTIONS
Status: DISCONTINUED | OUTPATIENT
Start: 2022-11-30 | End: 2022-11-30 | Stop reason: HOSPADM

## 2022-11-30 RX ORDER — OXYCODONE AND ACETAMINOPHEN 5; 325 MG/1; MG/1
1 TABLET ORAL EVERY 6 HOURS PRN
Qty: 28 TABLET | Refills: 0 | Status: SHIPPED | OUTPATIENT
Start: 2022-11-30 | End: 2022-12-07

## 2022-11-30 RX ORDER — HYDROMORPHONE HCL IN WATER/PF 6 MG/30 ML
0.4 PATIENT CONTROLLED ANALGESIA SYRINGE INTRAVENOUS EVERY 5 MIN PRN
Status: DISCONTINUED | OUTPATIENT
Start: 2022-11-30 | End: 2022-11-30 | Stop reason: HOSPADM

## 2022-11-30 RX ORDER — FENTANYL CITRATE 50 UG/ML
INJECTION, SOLUTION INTRAMUSCULAR; INTRAVENOUS PRN
Status: DISCONTINUED | OUTPATIENT
Start: 2022-11-30 | End: 2022-11-30

## 2022-11-30 RX ORDER — ONDANSETRON 2 MG/ML
INJECTION INTRAMUSCULAR; INTRAVENOUS PRN
Status: DISCONTINUED | OUTPATIENT
Start: 2022-11-30 | End: 2022-11-30

## 2022-11-30 RX ORDER — FENTANYL CITRATE 50 UG/ML
50 INJECTION, SOLUTION INTRAMUSCULAR; INTRAVENOUS EVERY 5 MIN PRN
Status: DISCONTINUED | OUTPATIENT
Start: 2022-11-30 | End: 2022-11-30 | Stop reason: HOSPADM

## 2022-11-30 RX ORDER — MEPERIDINE HYDROCHLORIDE 25 MG/ML
12.5 INJECTION INTRAMUSCULAR; INTRAVENOUS; SUBCUTANEOUS
Status: DISCONTINUED | OUTPATIENT
Start: 2022-11-30 | End: 2022-11-30 | Stop reason: HOSPADM

## 2022-11-30 RX ORDER — ACETAMINOPHEN 325 MG/1
650 TABLET ORAL
Status: DISCONTINUED | OUTPATIENT
Start: 2022-11-30 | End: 2022-11-30 | Stop reason: HOSPADM

## 2022-11-30 RX ORDER — BUPIVACAINE HYDROCHLORIDE 5 MG/ML
INJECTION, SOLUTION EPIDURAL; INTRACAUDAL PRN
Status: DISCONTINUED | OUTPATIENT
Start: 2022-11-30 | End: 2022-11-30 | Stop reason: HOSPADM

## 2022-11-30 RX ORDER — FENTANYL CITRATE 50 UG/ML
25 INJECTION, SOLUTION INTRAMUSCULAR; INTRAVENOUS EVERY 5 MIN PRN
Status: DISCONTINUED | OUTPATIENT
Start: 2022-11-30 | End: 2022-11-30 | Stop reason: HOSPADM

## 2022-11-30 RX ORDER — CEFAZOLIN SODIUM/WATER 2 G/20 ML
2 SYRINGE (ML) INTRAVENOUS
Status: COMPLETED | OUTPATIENT
Start: 2022-11-30 | End: 2022-11-30

## 2022-11-30 RX ORDER — LIDOCAINE 40 MG/G
CREAM TOPICAL
Status: DISCONTINUED | OUTPATIENT
Start: 2022-11-30 | End: 2022-11-30 | Stop reason: HOSPADM

## 2022-11-30 RX ORDER — SODIUM CHLORIDE, SODIUM LACTATE, POTASSIUM CHLORIDE, CALCIUM CHLORIDE 600; 310; 30; 20 MG/100ML; MG/100ML; MG/100ML; MG/100ML
INJECTION, SOLUTION INTRAVENOUS CONTINUOUS
Status: DISCONTINUED | OUTPATIENT
Start: 2022-11-30 | End: 2022-11-30 | Stop reason: HOSPADM

## 2022-11-30 RX ORDER — KETOROLAC TROMETHAMINE 30 MG/ML
15 INJECTION, SOLUTION INTRAMUSCULAR; INTRAVENOUS ONCE
Status: DISCONTINUED | OUTPATIENT
Start: 2022-11-30 | End: 2022-11-30 | Stop reason: HOSPADM

## 2022-11-30 RX ORDER — ONDANSETRON 2 MG/ML
4 INJECTION INTRAMUSCULAR; INTRAVENOUS EVERY 30 MIN PRN
Status: DISCONTINUED | OUTPATIENT
Start: 2022-11-30 | End: 2022-11-30 | Stop reason: HOSPADM

## 2022-11-30 RX ORDER — ONDANSETRON 4 MG/1
4 TABLET, ORALLY DISINTEGRATING ORAL EVERY 30 MIN PRN
Status: DISCONTINUED | OUTPATIENT
Start: 2022-11-30 | End: 2022-11-30 | Stop reason: HOSPADM

## 2022-11-30 RX ADMIN — MIDAZOLAM 2 MG: 1 INJECTION INTRAMUSCULAR; INTRAVENOUS at 14:31

## 2022-11-30 RX ADMIN — ONDANSETRON HYDROCHLORIDE 4 MG: 2 INJECTION, SOLUTION INTRAVENOUS at 14:43

## 2022-11-30 RX ADMIN — Medication 2 G: at 14:31

## 2022-11-30 RX ADMIN — PROPOFOL 40 MCG/KG/MIN: 10 INJECTION, EMULSION INTRAVENOUS at 14:38

## 2022-11-30 RX ADMIN — PROPOFOL 50 MG: 10 INJECTION, EMULSION INTRAVENOUS at 14:38

## 2022-11-30 RX ADMIN — SODIUM CHLORIDE, POTASSIUM CHLORIDE, SODIUM LACTATE AND CALCIUM CHLORIDE: 600; 310; 30; 20 INJECTION, SOLUTION INTRAVENOUS at 13:12

## 2022-11-30 RX ADMIN — SODIUM CHLORIDE, POTASSIUM CHLORIDE, SODIUM LACTATE AND CALCIUM CHLORIDE: 600; 310; 30; 20 INJECTION, SOLUTION INTRAVENOUS at 13:20

## 2022-11-30 RX ADMIN — LIDOCAINE HYDROCHLORIDE 50 MG: 10 INJECTION, SOLUTION INFILTRATION; PERINEURAL at 14:38

## 2022-11-30 RX ADMIN — FENTANYL CITRATE 50 MCG: 50 INJECTION, SOLUTION INTRAMUSCULAR; INTRAVENOUS at 14:41

## 2022-11-30 RX ADMIN — FENTANYL CITRATE 50 MCG: 50 INJECTION, SOLUTION INTRAMUSCULAR; INTRAVENOUS at 14:36

## 2022-11-30 ASSESSMENT — ACTIVITIES OF DAILY LIVING (ADL)
ADLS_ACUITY_SCORE: 35
ADLS_ACUITY_SCORE: 35

## 2022-11-30 NOTE — DISCHARGE INSTRUCTIONS
[facility] ADULT DISCHARGE INSTRUCTIONS   SPECIAL PRECAUTIONS FOR 24 HOURS AFTER SURGERY    IT IS NOT UNUSUAL TO FEEL LIGHT-HEADED OR FAINT, UP TO 24 HOURS AFTER SURGERY OR WHILE TAKING PAIN MEDICATION.  IF YOU HAVE THESE SYMPTOMS; SIT FOR A FEW MINUTES BEFORE STANDING AND HAVE SOMEONE ASSIST YOU WHEN YOU GET UP TO WALK OR USE THE BATHROOM.    YOU SHOULD REST AND RELAX FOR THE NEXT 24 HOURS AND YOU MUST MAKE ARRANGEMENTS TO HAVE SOMEONE STAY WITH YOU FOR AT LEAST 24 HOURS AFTER YOUR DISCHARGE.  AVOID HAZARDOUS AND STRENUOUS ACTIVITIES.  DO NOT MAKE IMPORTANT DECISIONS FOR 24 HOURS.    DO NOT DRIVE ANY VEHICLE OR OPERATE MECHANICAL EQUIPMENT FOR 24 HOURS FOLLOWING THE END OF YOUR SURGERY.  EVEN THOUGH YOU MAY FEEL NORMAL, YOUR REACTIONS MAY BE AFFECTED BY THE MEDICATION YOU HAVE RECEIVED.    DO NOT DRINK ALCOHOLIC BEVERAGES FOR 24 HOURS FOLLOWING YOUR SURGERY.    DRINK CLEAR LIQUIDS (APPLE JUICE, GINGER ALE, 7-UP, BROTH, ETC.).  PROGRESS TO YOUR REGULAR DIET AS YOU FEEL ABLE.    YOU MAY HAVE A DRY MOUTH, A SORE THROAT, MUSCLES ACHES OR TROUBLE SLEEPING.  THESE SHOULD GO AWAY AFTER 24 HOURS.    CALL YOUR DOCTOR FOR ANY OF THE FOLLOWING:  SIGNS OF INFECTION (FEVER, GROWING TENDERNESS AT THE SURGERY SITE, A LARGE AMOUNT OF DRAINAGE OR BLEEDING, SEVERE PAIN, FOUL-SMELLING DRAINAGE, REDNESS OR SWELLING.    IT HAS BEEN OVER 8 TO 10 HOURS SINCE SURGERY AND YOU ARE STILL NOT ABLE TO URINATE (PASS WATER).      Maximum acetaminophen (Tylenol) dose from all sources should not exceed 4 grams (4000 mg) per day.

## 2022-11-30 NOTE — ANESTHESIA PREPROCEDURE EVALUATION
Anesthesia Pre-Procedure Evaluation    Patient: Crispin Rojas   MRN: [id number] : 1960        Procedure : Procedure(s):  Right second metatarsal osteotomy, second digit hammertoe repair          Past Medical History:   Diagnosis Date     Cerebral infarction (H)          Chronic infection      H/O blood clots      Hyperlipidemia LDL goal <70      Hypertension      Numbness and tingling      Obesity      GILA (obstructive sleep apnea) 10/22/2018    CPAP intolerant     PVD (peripheral vascular disease) (H)     s/p left partial toe amputation     Renal stone      Tremor      Type 2 diabetes mellitus with diabetic polyneuropathy, with long-term current use of insulin (H)       Past Surgical History:   Procedure Laterality Date     AMPUTATE FOOT Left 2016    Procedure: AMPUTATE FOOT;  Surgeon: Jack Younger DPM;  Location: [facility]     AMPUTATE TOE(S) Right 2016    Procedure: AMPUTATE TOE(S);  Surgeon: Rachelle Manriquez DPM, Pod;  Location: [facility]     AMPUTATE TOE(S) Right 2019    Procedure: Right fourth toe partial amputation for treatment of osteomyelitis.;  Surgeon: Jack Younger DPM;  Location: [facility]     AMPUTATE TOE(S) Left 2019    Procedure: Left second toe amputation at the metatarsophalangeal joint.;  Surgeon: Rachelle Manriquez DPM, Podiatry/Foot and Ankle Surgery;  Location: [facility]     AMPUTATE TOE(S) Right 2022    Procedure: AMPUTATION OF RIGHT GREAT TOE;  Surgeon: Wili Quiles DPM;  Location: [facility]     AMPUTATE TOE(S) Right 2022    Procedure: Right foot partial first metatarsal resection;  Surgeon: Tiny Soto DPM;  Location: [facility]     ANGIOGRAM       BIOPSY BONE FOOT Right 2022    Procedure: Bone biopsy, right first metatarsal.;  Surgeon: Valentino Molina DPM;  Location: [facility]     COLONOSCOPY       COMBINED CYSTOSCOPY, RETROGRADES, URETEROSCOPY, INSERT STENT Left 2016    Procedure: COMBINED CYSTOSCOPY, RETROGRADES, URETEROSCOPY,  INSERT STENT;  Surgeon: Artemio Valenzuela MD;  Location: [facility]     COMBINED CYSTOSCOPY, RETROGRADES, URETEROSCOPY, LASER HOLMIUM LITHOTRIPSY URETER(S), INSERT STENT Left 10/3/2016    Procedure: COMBINED CYSTOSCOPY, RETROGRADES, URETEROSCOPY, LASER HOLMIUM LITHOTRIPSY URETER(S), INSERT STENT;  Surgeon: Artemio Valenzuela MD;  Location: [facility]     EXTRACORPOREAL SHOCK WAVE LITHOTRIPSY (ESWL) Left 9/8/2016    Procedure: EXTRACORPOREAL SHOCK WAVE LITHOTRIPSY (ESWL);  Surgeon: Artemio Valenzuela MD;  Location: [facility]     INCISION AND DRAINAGE FOOT, COMBINED Right 7/24/2022    Procedure: Incision and drainage, right foot ;  Surgeon: Valentino Molina DPM;  Location: [facility]     RECESSION GASTROCNEMIUS Right 2/1/2016    Procedure: RECESSION GASTROCNEMIUS;  Surgeon: Rachelle Manriquez DPM, Pod;  Location: [facility]      Allergies   Allergen Reactions     Hmg-Coa-R Inhibitors Swelling     Other reaction(s): Other (see comments)  SIMCOR caused edema in extremities       Bactrim [Sulfamethoxazole W/Trimethoprim] Nausea and Vomiting     Sulfa Drugs Nausea     Niacin Swelling     SIMCOR caused edema in extremities     Simvastatin Swelling     SIMCOR caused edema in extremities      Social History     Tobacco Use     Smoking status: Never     Smokeless tobacco: Never   Substance Use Topics     Alcohol use: Yes     Alcohol/week: 0.0 standard drinks     Comment: rare---red wine 3x per month      Wt Readings from Last 1 Encounters:   11/30/22 106.1 kg (234 lb)        Anesthesia Evaluation            ROS/MED HX  ENT/Pulmonary:     (+) sleep apnea, moderate, uses CPAP,     Neurologic:     (+) CVA,     Cardiovascular:     (+) hypertension-Peripheral Vascular Disease-- Symptomatic. ---    METS/Exercise Tolerance:     Hematologic: Comments: Lab Test        11/29/22 09/12/22 09/08/22 11/08/19 11/07/19                       1112          1232          1215          0816          1817          WBC          5.6           3.7*         4.5            < >        5.4           HGB          11.7*        10.8*        11.5*          < >        11.5*         MCV          90           91           90             < >        90            PLT          147*         143*         170            < >        168           INR           --           --           --           --          1.08           < > = values in this interval not displayed.                  Lab Test        11/30/22 11/29/22 09/12/22 09/08/22 08/25/22 08/22/22                       1252          1112          1232          1215          1315          1500          NA            --          139          139           --           --          140           POTASSIUM     --          4.4          4.1           --           --          4.3           CHLORIDE      --          103          104           --           --          102           CO2           --          24           24            --           --          27            BUN           --          27.1*        28.3*        34.2*          < >        27.1*         CR            --          0.85         0.80         0.88           < >        0.80  0.80   ANIONGAP      --          12           11            --           --          11            NARA           --          9.3          9.4           --           --          9.9           GLC          94           107*         159*          --           --          119*           < > = values in this interval not displayed.                     (+) anemia,     Musculoskeletal:       GI/Hepatic:  - neg GI/hepatic ROS   (+) liver disease,     Renal/Genitourinary:     (+) renal disease,     Endo:     (+) type II DM, Using insulin, - not using insulin pump. not previously admitted for DM/DKA. Diabetic complications: neuropathy. Obesity,     Psychiatric/Substance Use:  - neg psychiatric ROS     Infectious Disease:  - neg infectious disease ROS     Malignancy:  - neg  malignancy ROS     Other:  - neg other ROS          Physical Exam    Airway        Mallampati: II   TM distance: > 3 FB   Neck ROM: full   Mouth opening: > 3 cm    Respiratory Devices and Support         Dental  no notable dental history         Cardiovascular   cardiovascular exam normal          Pulmonary   pulmonary exam normal                OUTSIDE LABS:  CBC:   Lab Results   Component Value Date    WBC 5.6 11/29/2022    WBC 3.7 (L) 09/12/2022    HGB 11.7 (L) 11/29/2022    HGB 10.8 (L) 09/12/2022    HCT 36.7 (L) 11/29/2022    HCT 34.7 (L) 09/12/2022     (L) 11/29/2022     (L) 09/12/2022     BMP:   Lab Results   Component Value Date     11/29/2022     09/12/2022    POTASSIUM 4.4 11/29/2022    POTASSIUM 4.1 09/12/2022    CHLORIDE 103 11/29/2022    CHLORIDE 104 09/12/2022    CO2 24 11/29/2022    CO2 24 09/12/2022    BUN 27.1 (H) 11/29/2022    BUN 28.3 (H) 09/12/2022    CR 0.85 11/29/2022    CR 0.80 09/12/2022    GLC 94 11/30/2022     (H) 11/29/2022     COAGS:   Lab Results   Component Value Date    PTT 39 (H) 07/28/2022    INR 1.08 11/07/2019     POC:   Lab Results   Component Value Date     (H) 05/10/2021     HEPATIC:   Lab Results   Component Value Date    ALBUMIN 4.2 09/12/2022    PROTTOTAL 7.3 09/12/2022    ALT 22 09/12/2022    AST 34 09/12/2022    ALKPHOS 54 09/12/2022    BILITOTAL 0.3 09/12/2022     OTHER:   Lab Results   Component Value Date    LACT 1.7 07/22/2022    A1C 5.6 11/29/2022    NARA 9.3 11/29/2022    MAG 2.1 08/02/2019    LIPASE 156 07/22/2022    TSH 1.18 07/23/2022    CRP <3.00 09/08/2022    SED 24 (H) 09/08/2022       Anesthesia Plan    ASA Status:  3      Anesthesia Type: MAC.     - Reason for MAC: immobility needed   Induction: Propofol.   Maintenance: TIVA.        Consents    Anesthesia Plan(s) and associated risks, benefits, and realistic alternatives discussed. Questions answered and patient/representative(s) expressed understanding.    - Discussed:      - Discussed with:  Patient      - Extended Intubation/Ventilatory Support Discussed: No.      - Patient is DNR/DNI Status: No    Use of blood products discussed: Yes.     - Discussed with: Patient.     - Consented: consented to blood products            Reason for refusal: other.     Postoperative Care    Pain management: IV analgesics.   PONV prophylaxis: Ondansetron (or other 5HT-3), Dexamethasone or Solumedrol     Comments:                Juan Pablo Wilde MD

## 2022-11-30 NOTE — ANESTHESIA CARE TRANSFER NOTE
Patient: Crispin Rojas    Procedure: Procedure(s):  Right second metatarsal osteotomy, second digit hammertoe repair       Diagnosis: Right foot ulcer, with fat layer exposed (H) [L97.512]  Diagnosis Additional Information: No value filed.    Anesthesia Type:   MAC     Note:    Oropharynx: oropharynx clear of all foreign objects  Level of Consciousness: awake  Oxygen Supplementation: face mask  Level of Supplemental Oxygen (L/min / FiO2): 6  Independent Airway: airway patency satisfactory and stable  Dentition: dentition unchanged  Vital Signs Stable: post-procedure vital signs reviewed and stable  Report to RN Given: handoff report given  Patient transferred to: Phase II    Handoff Report: Identifed the Patient, Identified the Reponsible Provider, Reviewed the pertinent medical history, Discussed the surgical course, Reviewed Intra-OP anesthesia mangement and issues during anesthesia, Set expectations for post-procedure period and Allowed opportunity for questions and acknowledgement of understanding      Vitals:  Vitals Value Taken Time   /82    Temp 37    Pulse 58    Resp 12    SpO2 100        Electronically Signed By: SARAH Villatoro CRNA  November 30, 2022  3:41 PM

## 2022-11-30 NOTE — BRIEF OP NOTE
[facility]    Brief Operative Note    Pre-operative diagnosis: Right foot ulcer, with fat layer exposed (H) [L9.021]  Post-operative diagnosis Same as pre-operative diagnosis    Procedure:   1. Right second metatarsal floating osteotomy   2. Right second digit proximal phalanx arthroplasty  3. Right percutaneous flexor tenotomy     Surgeon: Surgeon(s) and Role:     * Tiny Soto DPM - Primary  Anesthesia: MAC   Estimated Blood Loss: 5 ml     Drains: None  Specimens: * No specimens in log *  Findings:   Dorsally dislocated second digit. Less prominent plantarflexory pressure. Adequate blood flow for procedure   Complications: None.  Implants: * No implants in log *

## 2022-12-01 NOTE — ANESTHESIA POSTPROCEDURE EVALUATION
Patient: Crispin Rojas    Procedure: Procedure(s):  Right second metatarsal osteotomy, second digit hammertoe repair       Anesthesia Type:  MAC    Note:     Postop Pain Control: Uneventful            Sign Out: Well controlled pain   PONV: No   Neuro/Psych: Uneventful            Sign Out: Acceptable/Baseline neuro status   Airway/Respiratory: Uneventful            Sign Out: Acceptable/Baseline resp. status   CV/Hemodynamics: Uneventful            Sign Out: Acceptable CV status   Other NRE: NONE   DID A NON-ROUTINE EVENT OCCUR? No           Last vitals:  Vitals Value Taken Time   /81 11/30/22 1635   Temp 98.1  F (36.7  C) 11/30/22 1635   Pulse 56 11/30/22 1635   Resp 14 11/30/22 1635   SpO2 97 % 11/30/22 1636   Vitals shown include unvalidated device data.    Electronically Signed By: Joseph Lopez MD  November 30, 2022  6:10 PM

## 2022-12-01 NOTE — OP NOTE
PREOPERATIVE DIAGNOSES:     1.  Right foot submet two ulcer, depth to subcutaneous tissue  2.  Diabetes Mellitus       POSTOPERATIVE DIAGNOSES:   1.  Right foot submet two ulcer, depth to subcutaneous tissue  2.  Diabetes Mellitus       Procedure:        1. Right second metatarsal floating osteotomy   2. Right second digit proximal phalanx arthroplasty  3. Right percutaneous flexor tenotomy       ANESTHESIA:  MAC with local.       HEMOSTASIS:  Electrocautery.       ESTIMATED BLOOD LOSS:  5 mL.       SPECIMENS:  None      MATERIALS:  Quarter-inch plain packing.     Indications: Crispin Rojas  has an ulcer to the right foot, submet 2. Site developed recently following significant ambulation and is a result of transfer pressure following previous first ray resection. It's also noted that he has a plantarflexed second metatarsal with a dorsally dislocated second digit applying retrograde pressure on the site, contributing to the ulcer. Due to this, discussion was had about need to perform a floating osteotomy to the second metatarsal. He'll also need resection including tendon lengthening of the second digit to offload the site. This has discussed in detail including possible elective amputation of second toe, which he would prefer not to do. Procedure was discussed with patient at length, including all risks and benefits. Patient agrees to planned procedure.     Procedure: Under mild sedation pt was brought into the OR & placed on the operating table in a supine position. A total of 20 cc of .5% marcaine were injected into the base of the second toe. An ankle tourniquet was applied and was inflated to 250 mmHg for a total of 33 mins.  The foot was scrubbed, prepped and draped in an aseptic manner.  An incision was made along the second digit, exposing the extensor tendon. This was was then transected, exposing the base of the proximal phalanx. The digit was found to be completely on top of the metatarsal, making the  distal metatarsal head inaccessible. Given this and need to offload the metatarsal, the sagittal was was used and the base of the proximal phalanx was resected. This not only made the metatarsal head accessible for the osteotomy, but also relieved the retrograde pressure to site. Once this was resected, the metatarsal head was exposed. The sagittal saw was used and the metatarsal head was displaced dorsally. There was a palpable difference to the plantar foot, demonstrating sufficient offloading to the submet 2 ulcer. Lastly, a flexor tenotomy was performed with an 18 elliott needle. This allowed the toe to be more rectus and also helped to remove the retrograde forces from the toe.    The tournqieut was deflated after 33 minutes. All bleeders were ligated and cauterized as necessary. Next copious amounts of saline were used to flush the amputation site. Site was then closed with 3-0 vicryl and 3.0 prolene suture.  Upon completion of suturing, a non-adhesive sterile dressing was then placed over the surgical site followed by 4 x 4s, kerlix, & an ACE wrap.     The pt tolerated the procedure & anesthesia well.  He was transferred to the recovery room with vital signs stable and vascular status intact to the right foot.  Following a period of post-op monitoring the pt will return to his hospital bed with the following written and oral post-op instructions:    Keep dressing clean, dry and intact.  Do not remove dressing.  WB to heel in air cast boot  Elevate right foot at rest.  Contact Dr. Soto if any post-op questions or arrise.

## 2022-12-02 ENCOUNTER — MYC MEDICAL ADVICE (OUTPATIENT)
Dept: WOUND CARE | Facility: CLINIC | Age: 62
End: 2022-12-02

## 2022-12-02 NOTE — TELEPHONE ENCOUNTER
Please see [employer] message regarding low back pain.   There is no consent to communicate on file.     Phone call to patient. He denies history of low back pain.  He has a history of kidney stones but feels this is in a different area. He denies blood in the urine.  He states it started in the hospital before he left after surgery and he thought it was from the hospital bed. Pain is located across his whole low back. He denies numbness or tingling, weakness, or loss of bowel or bladder control. Discussed that sometimes when elevating a foot after surgery, patient's can get other body aches or low back pain. He has been using a wedge as well which is much higher than pillows. He is going to try to use pillows instead of the wedge to see if that helps. Recommended if back pain continues to see a spine provider.     He is doing well after surgery otherwise. He has not been taking any pain medication. Dressing is C/D/I and he is not bearing weight on his foot. He has a follow up appointment scheduled with Dr. Soto on 12/7/22.   He verbalized understanding.     AMMON Ceron RN

## 2022-12-05 ENCOUNTER — TRANSFERRED RECORDS (OUTPATIENT)
Dept: SURGERY | Facility: CLINIC | Age: 62
End: 2022-12-05

## 2022-12-05 ENCOUNTER — TELEPHONE (OUTPATIENT)
Dept: PODIATRY | Facility: CLINIC | Age: 62
End: 2022-12-05

## 2022-12-05 NOTE — TELEPHONE ENCOUNTER
"Per RAMÍREZ: \"Hello-     If it s a really small amount and doesn t seem to be increasing, then the bandage can just stay on until Wednesday. If the strike through is bright red or seems to be getting larger, then the bandage can be changed and the site evaluated. He should remove everything. Cleanse the site with some wound cleanser. Pad dry. Then rewrap the foot with gauze and an ace bandage.     Let me know if he has any questions. Thanks.     Tiny \" Called and relayed message to pt. Verbalized understanding and agreeable to plan. States it is not bright red and is not growing in size. No further questions.     Stacy BURT RN    "

## 2022-12-05 NOTE — TELEPHONE ENCOUNTER
"Pt calling in regarding discharge near surgical site. States \"this was here prior to the surgery \"it's a very small amount.\" Pt states that he was told he cannot change bandages but is wondering if he should now due to the discharge. Called specialty clinic, states they don't have direct number for them, will route to provider.    Stacy BURT RN        "

## 2022-12-05 NOTE — TELEPHONE ENCOUNTER
[facility] Call Center    Phone Message    May a detailed message be left on voicemail: yes     Reason for Call Patent concern about Oozing from underneath Bandage. Please call Patient. He had Surgery on Right Foot.    Action Taken: Message routed to:  Other: [facility]    Travel Screening: Not Applicable

## 2022-12-07 ENCOUNTER — OFFICE VISIT (OUTPATIENT)
Dept: PODIATRY | Facility: CLINIC | Age: 62
End: 2022-12-07
Payer: COMMERCIAL

## 2022-12-07 VITALS — SYSTOLIC BLOOD PRESSURE: 140 MMHG | WEIGHT: 234 LBS | DIASTOLIC BLOOD PRESSURE: 82 MMHG | BODY MASS INDEX: 32.64 KG/M2

## 2022-12-07 DIAGNOSIS — L97.511 ULCER OF RIGHT FOOT, LIMITED TO BREAKDOWN OF SKIN (H): Primary | ICD-10-CM

## 2022-12-07 DIAGNOSIS — Z98.890 S/P FOOT SURGERY, RIGHT: ICD-10-CM

## 2022-12-07 PROCEDURE — 99024 POSTOP FOLLOW-UP VISIT: CPT | Performed by: PODIATRIST

## 2022-12-07 NOTE — PROGRESS NOTES
[facility]  CLINIC NOTE    CHIEF COMPLAINT:  right foot    PATIENT HISTORY:  rCispin Rojas is a 62 year old male who follows up for right foot. Had procedure on 11/30. States some soreness following, but overall is tolerable. States has not taken pain medicine. States he hasn't been walking much. States dressing has been in place since surgery. Denies N/F/V/C/D.         Review of Systems:  A 10 point review of systems was performed and is positive for that noted above in the patient history.  All other areas are negative.     PAST MEDICAL HISTORY:   Past Medical History:   Diagnosis Date     Cerebral infarction (H)     2010     Chronic infection      H/O blood clots 2022     Hyperlipidemia LDL goal <70      Hypertension      Numbness and tingling      Obesity      GILA (obstructive sleep apnea) 10/22/2018    CPAP intolerant     PVD (peripheral vascular disease) (H)     s/p left partial toe amputation     Renal stone      Tremor      Type 2 diabetes mellitus with diabetic polyneuropathy, with long-term current use of insulin (H)         PAST SURGICAL HISTORY:   Past Surgical History:   Procedure Laterality Date     AMPUTATE FOOT Left 8/18/2016    Procedure: AMPUTATE FOOT;  Surgeon: Jack Younger DPM;  [facility]     AMPUTATE TOE(S) Right 2/1/2016    Procedure: AMPUTATE TOE(S);  Surgeon: Rachelle Manriquez DPM, Pod;  [facility]     AMPUTATE TOE(S) Right 8/2/2019    Procedure: Right fourth toe partial amputation for treatment of osteomyelitis.;  Surgeon: Jack Younger DPM;  [facility]     AMPUTATE TOE(S) Left 11/8/2019    Procedure: Left second toe amputation at the metatarsophalangeal joint.;  Surgeon: Rachelle Manriquez DPM, Podiatry/Foot and Ankle Surgery;  [facility]     AMPUTATE TOE(S) Right 6/5/2022    Procedure: AMPUTATION OF RIGHT GREAT TOE;  Surgeon: Wili Quiles DPM;  [facility]     AMPUTATE TOE(S) Right 7/28/2022    Procedure: Right foot  partial first metatarsal resection;  Surgeon: Tiny Soto DPM;  [facility]     ANGIOGRAM       BIOPSY BONE FOOT Right 7/24/2022    Procedure: Bone biopsy, right first metatarsal.;  Surgeon: Valentino Molina DPM;  Location: [facility]     COLONOSCOPY       COMBINED CYSTOSCOPY, RETROGRADES, URETEROSCOPY, INSERT STENT Left 8/21/2016    Procedure: COMBINED CYSTOSCOPY, RETROGRADES, URETEROSCOPY, INSERT STENT;  Surgeon: Artemio Valenzuela MD;  [facility]     COMBINED CYSTOSCOPY, RETROGRADES, URETEROSCOPY, LASER HOLMIUM LITHOTRIPSY URETER(S), INSERT STENT Left 10/3/2016    Procedure: COMBINED CYSTOSCOPY, RETROGRADES, URETEROSCOPY, LASER HOLMIUM LITHOTRIPSY URETER(S), INSERT STENT;  Surgeon: Artemio Valenzuela MD;  [facility]     EXTRACORPOREAL SHOCK WAVE LITHOTRIPSY (ESWL) Left 9/8/2016    Procedure: EXTRACORPOREAL SHOCK WAVE LITHOTRIPSY (ESWL);  Surgeon: Artemio Valenzuela MD;  Location: [facility]     INCISION AND DRAINAGE FOOT, COMBINED Right 7/24/2022    Procedure: Incision and drainage, right foot ;  Surgeon: Valentino Molina DPM;  [facility]     OSTEOTOMY FOOT Right 11/30/2022    Procedure: 1. Right second metatarsal floating osteotomy  2. Right second digit proximal phalanx arthroplasty 3. Right percutaneous flexor tenotomy;  Surgeon: Tiny Soto DPM;  [facility]     RECESSION GASTROCNEMIUS Right 2/1/2016    Procedure: RECESSION GASTROCNEMIUS;  Surgeon: Rachelle Manriquez DPM, Pod;  [facility]        MEDICATIONS:  Reviewed in Epic.     ALLERGIES:    Allergies   Allergen Reactions     Hmg-Coa-R Inhibitors Swelling     Other reaction(s): Other (see comments)  SIMCOR caused edema in extremities       Bactrim [Sulfamethoxazole W/Trimethoprim] Nausea and Vomiting     Sulfa Drugs Nausea     Niacin Swelling     SIMCOR caused edema in extremities     Simvastatin Swelling     SIMCOR caused edema in extremities        SOCIAL HISTORY:   Social History     Socioeconomic  History     Marital status:      Spouse name: Sydney     Number of children: 2     Years of education: Not on file     Highest education level: Master's degree (e.g., MA, MS, Arleth, MEd, MSW, ELIANA)   Occupational History     Occupation: marketing     Employer: [employer]     Comment: [employer]   Tobacco Use     Smoking status: Never     Smokeless tobacco: Never   Vaping Use     Vaping Use: Never used   Substance and Sexual Activity     Alcohol use: Yes     Alcohol/week: 0.0 standard drinks     Comment: rare---red wine 3x per month     Drug use: No     Sexual activity: Not Currently     Partners: Female   Other Topics Concern     Parent/sibling w/ CABG, MI or angioplasty before 65F 55M? No   Social History Narrative     Not on file     Social Determinants of Health     Financial Resource Strain: Low Risk      Difficulty of Paying Living Expenses: Not very hard   Food Insecurity: No Food Insecurity     Worried About Running Out of Food in the Last Year: Never true     Ran Out of Food in the Last Year: Never true   Transportation Needs: No Transportation Needs     Lack of Transportation (Medical): No     Lack of Transportation (Non-Medical): No   Physical Activity: Sufficiently Active     Days of Exercise per Week: 4 days     Minutes of Exercise per Session: 40 min   Stress: No Stress Concern Present     Feeling of Stress : Not at all   Social Connections: Moderately Isolated     Frequency of Communication with Friends and Family: Once a week     Frequency of Social Gatherings with Friends and Family: Never     Attends U[employer] Services: More than 4 times per year     Active Member of Clubs or Organizations: No     Attends Club or Organization Meetings: Not on file     Marital Status:    Intimate Partner Violence: Not on file   Housing Stability: Low Risk      Unable to Pay for Housing in the Last Year: No     Number of Places Lived in the Last Year: 2     Unstable Housing in the Last Year: No         FAMILY HISTORY:   Family History   Problem Relation Age of Onset     Arthritis Mother         SLE     Connective Tissue Disorder Mother         lupus     Diabetes Mother      Cerebrovascular Disease Mother      Cancer Mother      Diabetes Father      Diabetes Maternal Grandfather      Diabetes Sister         EXAM:Vitals: BP (!) 140/82   Wt 106.1 kg (234 lb)   BMI 32.64 kg/m    BMI= Body mass index is 32.64 kg/m .      General appearance: Patient is alert and fully cooperative with history & exam.  No sign of distress is noted during the visit.      Respiratory: Breathing is regular & unlabored while sitting.      HEENT: Hearing is intact to spoken word.  Speech is clear.  No gross evidence of visual impairment that would impact ambulation.      Dermatologic:  -Submet 2 right foot ulcer now to subcutaneous tissue measures 1.5 cm x 1 cm x .2 cm. Granular wound bed. No cellulitis. Site drier and periwound healthier. Dorsal site incision intact and closed appropriately. No cellulitis.   Left foot submet 3 callus/preuclerative lesion, debrided to level of dermis. No open lesions or cellulitis.      Vascular: Dorsalis pedis and posterior tibial pulses are intact & regular bilaterally.  CFT and skin temperature is normal to both lower extremities.       Neurologic: Lower extremity sensation is diminished, bilateral foot, to light touch.  No evidence of neurological-based weakness or contracture in the lower extremities.       Musculoskeletal: Patient is ambulatory without an assistive device or brace.  S/p right first ray resection. Right 2nd digit --> now more rectus.   S/p left hallux and second digit amputations     Psychiatric: Affect is pleasant & appropriate.    Imaging:        ASSESSMENT:  1. Right foot submet two ulcer s/p first ray resection --> now s/p 2nd metatarsal floating osteotomy with hammertoe correction (proximal phalanx excision, flexor tenotomy)     2. Left foot submet two ulcer  --> preulcerative  --> remain stable.      MEDICAL DECISION MAKING:   -Patient seen for right foot. No significant pain or edema. Incision sites intact.     -Discussed need for continued minimal activity given that bone will healing over the next 4-6 weeks. Presents in CAM boot. Is anxious to get to golfing, exercise and work. Discussed with him that significant activity will lead to increased pain, swelling and delayed osseous union. He's also at risk for incisional dehiscence with too much activity.     -Plan for patient to be off of work for the rest of the week. Next week, okay to return to work as long as is primarily seated. Must wear CAM boot at all times.     -Will see back in 2 weeks.     Tiny Soto DPM   [facility] Pod[facility]/Foot & Ankle Surgery

## 2022-12-18 DIAGNOSIS — E11.52 TYPE 2 DIABETES MELLITUS WITH DIABETIC PERIPHERAL ANGIOPATHY AND GANGRENE, WITH LONG-TERM CURRENT USE OF INSULIN (H): ICD-10-CM

## 2022-12-18 DIAGNOSIS — Z79.4 TYPE 2 DIABETES MELLITUS WITH DIABETIC PERIPHERAL ANGIOPATHY AND GANGRENE, WITH LONG-TERM CURRENT USE OF INSULIN (H): ICD-10-CM

## 2022-12-19 RX ORDER — INSULIN DEGLUDEC 100 U/ML
INJECTION, SOLUTION SUBCUTANEOUS
Qty: 60 ML | Refills: 1 | OUTPATIENT
Start: 2022-12-19

## 2022-12-19 NOTE — TELEPHONE ENCOUNTER
I called th ept, we sent a 3 mo supply with one refill 8/1/22, however the pt states that the pharmacy states, they do not have the RX. I asked the pt how he has been getting refills since August and he said he has received one refill since October. I asked the pt how much he received, he said 1 month. He will call his pharmacy to see why they are not dispensing what we prescribed.

## 2022-12-19 NOTE — TELEPHONE ENCOUNTER
[facility] Call Center    Phone Message    May a detailed message be left on voicemail: yes     Reason for Call: Medication Refill Request    Has the patient contacted the pharmacy for the refill? Yes   Name of medication being requested: TRESIBA FLEXTOUCH 100 UNIT/ML pen [Pharmacy Med Name: TRESIBA FLEXTOUCH PEN ([id number]    Provider who prescribed the medication: Shira Montanez  Pharmacy: [id number] - [place], [place] - [address] AT [facility]  Date medication is needed: 12/19/22   Pt needing this script asap pt is only in town  Until this wednesday

## 2022-12-20 RX ORDER — INSULIN DEGLUDEC 100 U/ML
INJECTION, SOLUTION SUBCUTANEOUS
Qty: 45 ML | Refills: 0 | Status: SHIPPED | OUTPATIENT
Start: 2022-12-20 | End: 2023-02-02

## 2022-12-20 NOTE — TELEPHONE ENCOUNTER
Over 3 month supply with 1 refill sent on 8/1/22  
Per Patient is needing the prescription to go to the [facility] [address], [place] [address] Phone: [phone]. Patient states he is needing the medication to be sent to the pharmacy listed above as he is needing the medication before he leaves to go out of town on Thursday 12/22. Patient states his previous pharmacy is out of stock. Patient is wanting to get a call back when this has been done. Patient is wanting this to be done ASAP Please advise.   
RX sent. Pt informed.  
Yes

## 2022-12-21 ENCOUNTER — OFFICE VISIT (OUTPATIENT)
Dept: PODIATRY | Facility: CLINIC | Age: 62
End: 2022-12-21
Payer: COMMERCIAL

## 2022-12-21 VITALS — BODY MASS INDEX: 32.36 KG/M2 | SYSTOLIC BLOOD PRESSURE: 144 MMHG | WEIGHT: 232 LBS | DIASTOLIC BLOOD PRESSURE: 82 MMHG

## 2022-12-21 DIAGNOSIS — L97.521 ULCER OF LEFT FOOT, LIMITED TO BREAKDOWN OF SKIN (H): ICD-10-CM

## 2022-12-21 DIAGNOSIS — Z98.890 S/P FOOT SURGERY, RIGHT: ICD-10-CM

## 2022-12-21 DIAGNOSIS — L97.511 ULCER OF RIGHT FOOT, LIMITED TO BREAKDOWN OF SKIN (H): Primary | ICD-10-CM

## 2022-12-21 DIAGNOSIS — Z89.411 STATUS POST AMPUTATION OF RIGHT GREAT TOE (H): ICD-10-CM

## 2022-12-21 PROCEDURE — 99213 OFFICE O/P EST LOW 20 MIN: CPT | Mod: 25 | Performed by: PODIATRIST

## 2022-12-21 PROCEDURE — 97597 DBRDMT OPN WND 1ST 20 CM/<: CPT | Mod: 58 | Performed by: PODIATRIST

## 2022-12-21 NOTE — PROGRESS NOTES
[facility]  CLINIC NOTE    CHIEF COMPLAINT:  right foot    PATIENT HISTORY:  Crispin Rojas is a 62 year old male who follows up for right foot. Had procedure on 11/30. Is now back to work. Also was out of town recently. Has been walking more. Wears his boot at times, but also wears just a surgical shoe. States top of foot has been draining, but has been decreasing over the past couple of days. States he also thought bottom was completely closed, but still drains some at times. Overall no pain. Going on vacation this weekend and hoping to golf. Denies N/F/V/C/D.          Review of Systems:  A 10 point review of systems was performed and is positive for that noted above in the patient history.  All other areas are negative.     PAST MEDICAL HISTORY:   Past Medical History:   Diagnosis Date     Cerebral infarction (H)     2010     Chronic infection      H/O blood clots 2022     Hyperlipidemia LDL goal <70      Hypertension      Numbness and tingling      Obesity      GILA (obstructive sleep apnea) 10/22/2018    CPAP intolerant     PVD (peripheral vascular disease) (H)     s/p left partial toe amputation     Renal stone      Tremor      Type 2 diabetes mellitus with diabetic polyneuropathy, with long-term current use of insulin (H)         PAST SURGICAL HISTORY:   Past Surgical History:   Procedure Laterality Date     AMPUTATE FOOT Left 8/18/2016    Procedure: AMPUTATE FOOT;  Surgeon: Jack Yonuger DPM;  [facility]     AMPUTATE TOE(S) Right 2/1/2016    Procedure: AMPUTATE TOE(S);  Surgeon: Rachelle Manriquez DPM, Pod;  [facility]     AMPUTATE TOE(S) Right 8/2/2019    Procedure: Right fourth toe partial amputation for treatment of osteomyelitis.;  Surgeon: Jack Younger DPM;  [facility]     AMPUTATE TOE(S) Left 11/8/2019    Procedure: Left second toe amputation at the metatarsophalangeal joint.;  Surgeon: Rachelle Manriquez DPM, Podiatry/Foot and Ankle Surgery;   [facility]     AMPUTATE TOE(S) Right 6/5/2022    Procedure: AMPUTATION OF RIGHT GREAT TOE;  Surgeon: Wili Quiles DPM;  [facility]     AMPUTATE TOE(S) Right 7/28/2022    Procedure: Right foot partial first metatarsal resection;  Surgeon: Tiny Soto DPM;  [facility]: [facility]     ANGIOGRAM       BIOPSY BONE FOOT Right 7/24/2022    Procedure: Bone biopsy, right first metatarsal.;  Surgeon: Valentino Molina DPM;  Location: [facility]     COLONOSCOPY       COMBINED CYSTOSCOPY, RETROGRADES, URETEROSCOPY, INSERT STENT Left 8/21/2016    Procedure: COMBINED CYSTOSCOPY, RETROGRADES, URETEROSCOPY, INSERT STENT;  Surgeon: Artemio Valenzuela MD;  [facility]     COMBINED CYSTOSCOPY, RETROGRADES, URETEROSCOPY, LASER HOLMIUM LITHOTRIPSY URETER(S), INSERT STENT Left 10/3/2016    Procedure: COMBINED CYSTOSCOPY, RETROGRADES, URETEROSCOPY, LASER HOLMIUM LITHOTRIPSY URETER(S), INSERT STENT;  Surgeon: rAtemio Valenzuela MD;  [facility]     EXTRACORPOREAL SHOCK WAVE LITHOTRIPSY (ESWL) Left 9/8/2016    Procedure: EXTRACORPOREAL SHOCK WAVE LITHOTRIPSY (ESWL);  Surgeon: Artemio Valenzuela MD;  [facility]     INCISION AND DRAINAGE FOOT, COMBINED Right 7/24/2022    Procedure: Incision and drainage, right foot ;  Surgeon: Valentino Molina DPM;  [facility]     OSTEOTOMY FOOT Right 11/30/2022    Procedure: 1. Right second metatarsal floating osteotomy  2. Right second digit proximal phalanx arthroplasty 3. Right percutaneous flexor tenotomy;  Surgeon: Tiny Soto DPM;  [facility]     RECESSION GASTROCNEMIUS Right 2/1/2016    Procedure: RECESSION GASTROCNEMIUS;  Surgeon: Rachelle Manriquez DPM, Pod;  [facility]        MEDICATIONS:  Reviewed in Epic.     ALLERGIES:    Allergies   Allergen Reactions     Hmg-Coa-R Inhibitors Swelling     Other reaction(s): Other (see comments)  SIMCOR caused edema in extremities       Bactrim [Sulfamethoxazole W/Trimethoprim] Nausea and  Vomiting     Sulfa Drugs Nausea     Niacin Swelling     SIMCOR caused edema in extremities     Simvastatin Swelling     SIMCOR caused edema in extremities        SOCIAL HISTORY:   Social History     Socioeconomic History     Marital status:      Spouse name: Sydney     Number of children: 2     Years of education: Not on file     Highest education level: Master's degree (e.g., MA, MS, Arleth, MEd, MSW, ELIANA)   Occupational History     Occupation: marketing     Employer: [employer]     Comment: [employer]   Tobacco Use     Smoking status: Never     Smokeless tobacco: Never   Vaping Use     Vaping Use: Never used   Substance and Sexual Activity     Alcohol use: Yes     Alcohol/week: 0.0 standard drinks     Comment: rare---red wine 3x per month     Drug use: No     Sexual activity: Not Currently     Partners: Female   Other Topics Concern     Parent/sibling w/ CABG, MI or angioplasty before 65F 55M? No   Social History Narrative     Not on file     Social Determinants of Health     Financial Resource Strain: Low Risk      Difficulty of Paying Living Expenses: Not very hard   Food Insecurity: No Food Insecurity     Worried About Running Out of Food in the Last Year: Never true     Ran Out of Food in the Last Year: Never true   Transportation Needs: No Transportation Needs     Lack of Transportation (Medical): No     Lack of Transportation (Non-Medical): No   Physical Activity: Sufficiently Active     Days of Exercise per Week: 4 days     Minutes of Exercise per Session: 40 min   Stress: No Stress Concern Present     Feeling of Stress : Not at all   Social Connections: Moderately Isolated     Frequency of Communication with Friends and Family: Once a week     Frequency of Social Gatherings with Friends and Family: Never     Attends Sikhism Services: More than 4 times per year     Active Member of Clubs or Organizations: No     Attends Club or Organization Meetings: Not on file     Marital Status:    Intimate  Partner Violence: Not on file   Housing Stability: Low Risk      Unable to Pay for Housing in the Last Year: No     Number of Places Lived in the Last Year: 2     Unstable Housing in the Last Year: No        FAMILY HISTORY:   Family History   Problem Relation Age of Onset     Arthritis Mother         SLE     Connective Tissue Disorder Mother         lupus     Diabetes Mother      Cerebrovascular Disease Mother      Cancer Mother      Diabetes Father      Diabetes Maternal Grandfather      Diabetes Sister         EXAM:Vitals: There were no vitals taken for this visit.  BMI= There is no height or weight on file to calculate BMI.      General appearance: Patient is alert and fully cooperative with history & exam.  No sign of distress is noted during the visit.      Respiratory: Breathing is regular & unlabored while sitting.      HEENT: Hearing is intact to spoken word.  Speech is clear.  No gross evidence of visual impairment that would impact ambulation.      Dermatologic:  -Submet 2 right foot ulcer smaller and drier. Close to closure. Fissure in appearance. 8 mm x 4 mm x 1 mm. Granular wound bed. No drainage. Periwound callus noted. Dorsal incision site, closed. Small open lesion adjacent to it. Slightly macerated. No cellulitis. Site drier and periwound healthier. Dorsal site incision intact and closed appropriately. No cellulitis.   Left foot submet 3 callus/preuclerative lesion, debrided to level of dermis. No open lesions or cellulitis.      Vascular: Dorsalis pedis and posterior tibial pulses are intact & regular bilaterally.  CFT and skin temperature is normal to both lower extremities.       Neurologic: Lower extremity sensation is diminished, bilateral foot, to light touch.  No evidence of neurological-based weakness or contracture in the lower extremities.       Musculoskeletal: Patient is ambulatory without an assistive device or brace.  S/p right first ray resection. Right 2nd digit --> now more rectus.    S/p left hallux and second digit amputations     Psychiatric: Affect is pleasant & appropriate.    Imaging:          PROCEDURE:   Verbal consent was obtained for debridement. A 15 blade was used to excise the nonivable tissue to the preulcerative sites on both feet down to and including dermis. No noted purulence afterwards. Sites measures 1cm x 1 x .1 cm, in total. No probe to bone. Well tolerated. Site was cleaned with alcohol.       ASSESSMENT:  1. Right foot submet two ulcer s/p first ray resection --> now s/p 2nd metatarsal floating osteotomy with hammertoe correction (proximal phalanx excision, flexor tenotomy) --> ulcer close to closure     2. Left foot submet two ulcer  --> preulcerative --> remain stable.      MEDICAL DECISION MAKING:   -Patient seen for right foot. No significant pain or edema. Incision site intact. Sutures removed. Some noted draining, although no hayde dehiscence.   -Discussed need for continued minimal walking. Likely walking too much, causing swelling and incisional draining. Discussed with him that significant walking will cause site to worsen, although sounds to be improving.   -Debrided right and left foot preulcerative lesions   -Patient asks about rx for new CMOs. Sent today.   -Asks about golfing this weekend, discussed with him that that would be too much walking for healing osteotomy site.   -F/u in 2 weeks. Hopeful all sites are fully healed at that point.     Tiny Soto DPM   [facility] of Podiatry/Foot & Ankle Surgery

## 2022-12-21 NOTE — LETTER
12/21/2022         RE: Crispin Rojas  [address] [facility] [address]        Dear Colleague,    Thank you for referring your patient, Crispin Rojas, to the [facility]. Please see a copy of my visit note below.    [facility]OT [facility]  CLINIC NOTE    CHIEF COMPLAINT:  right foot    PATIENT HISTORY:  Crispin Rojas is a 62 year old male who follows up for right foot. Had procedure on 11/30. Is now back to work. Also was out of town recently. Has been walking more. Wears his boot at times, but also wears just a surgical shoe. States top of foot has been draining, but has been decreasing over the past couple of days. States he also thought bottom was completely closed, but still drains some at times. Overall no pain. Going on vacation this weekend and hoping to golf. Denies N/F/V/C/D.          Review of Systems:  A 10 point review of systems was performed and is positive for that noted above in the patient history.  All other areas are negative.     PAST MEDICAL HISTORY:   Past Medical History:   Diagnosis Date     Cerebral infarction (H)     2010     Chronic infection      H/O blood clots 2022     Hyperlipidemia LDL goal <70      Hypertension      Numbness and tingling      Obesity      GILA (obstructive sleep apnea) 10/22/2018    CPAP intolerant     PVD (peripheral vascular disease) (H)     s/p left partial toe amputation     Renal stone      Tremor      Type 2 diabetes mellitus with diabetic polyneuropathy, with long-term current use of insulin (H)         PAST SURGICAL HISTORY:   Past Surgical History:   Procedure Laterality Date     AMPUTATE FOOT Left 8/18/2016    Procedure: AMPUTATE FOOT;  Surgeon: Jack Younger DPM;  Location: [facility] OR     AMPUTATE TOE(S) Right 2/1/2016    Procedure: AMPUTATE TOE(S);  Surgeon: Rachelle Manriquez DPM, Pod;  Location: [facility]     AMPUTATE TOE(S) Right 8/2/2019    Procedure: Right fourth toe partial amputation for  treatment of osteomyelitis.;  Surgeon: Jack Younger DPM;  Location: [facility]     AMPUTATE TOE(S) Left 11/8/2019    Procedure: Left second toe amputation at the metatarsophalangeal joint.;  Surgeon: Rachelle Manriquez DPM, Podiatry/Foot and Ankle Surgery;  Location: [facility]     AMPUTATE TOE(S) Right 6/5/2022    Procedure: AMPUTATION OF RIGHT GREAT TOE;  Surgeon: Wili Quiles DPM;  Location: [facility]     AMPUTATE TOE(S) Right 7/28/2022    Procedure: Right foot partial first metatarsal resection;  Surgeon: Tiny Soto DPM;  Location: [facility]     ANGIOGRAM       BIOPSY BONE FOOT Right 7/24/2022    Procedure: Bone biopsy, right first metatarsal.;  Surgeon: Valentino Molina DPM;  Location: [facility]     COLONOSCOPY       COMBINED CYSTOSCOPY, RETROGRADES, URETEROSCOPY, INSERT STENT Left 8/21/2016    Procedure: COMBINED CYSTOSCOPY, RETROGRADES, URETEROSCOPY, INSERT STENT;  Surgeon: Artemio Valenzuela MD;  Location: [facility]     COMBINED CYSTOSCOPY, RETROGRADES, URETEROSCOPY, LASER HOLMIUM LITHOTRIPSY URETER(S), INSERT STENT Left 10/3/2016    Procedure: COMBINED CYSTOSCOPY, RETROGRADES, URETEROSCOPY, LASER HOLMIUM LITHOTRIPSY URETER(S), INSERT STENT;  Surgeon: Artemio Valenzuela MD;  Location: [facility]     EXTRACORPOREAL SHOCK WAVE LITHOTRIPSY (ESWL) Left 9/8/2016    Procedure: EXTRACORPOREAL SHOCK WAVE LITHOTRIPSY (ESWL);  Surgeon: Artemio Valenzuela MD;  Location: [facility]     INCISION AND DRAINAGE FOOT, COMBINED Right 7/24/2022    Procedure: Incision and drainage, right foot ;  Surgeon: Valentino Molina DPM;  Location: [facility]     OSTEOTOMY FOOT Right 11/30/2022    Procedure: 1. Right second metatarsal floating osteotomy  2. Right second digit proximal phalanx arthroplasty 3. Right percutaneous flexor tenotomy;  Surgeon: Tiny Soto DPM;  Location: [facility]     RECESSION GASTROCNEMIUS Right 2/1/2016    Procedure: RECESSION GASTROCNEMIUS;  Surgeon: Rachelle Manriquez DPM, Pod;  Location: [facility]         MEDICATIONS:  Reviewed in Epic.     ALLERGIES:    Allergies   Allergen Reactions     Hmg-Coa-R Inhibitors Swelling     Other reaction(s): Other (see comments)  SIMCOR caused edema in extremities       Bactrim [Sulfamethoxazole W/Trimethoprim] Nausea and Vomiting     Sulfa Drugs Nausea     Niacin Swelling     SIMCOR caused edema in extremities     Simvastatin Swelling     SIMCOR caused edema in extremities        SOCIAL HISTORY:   Social History     Socioeconomic History     Marital status:      Spouse name: Sydney     Number of children: 2     Years of education: Not on file     Highest education level: Master's degree (e.g., MA, MS, [place], MEd, MSW, ELIANA)   Occupational History     Occupation: marketing     Employer: [employer]     Comment: [employer]   Tobacco Use     Smoking status: Never     Smokeless tobacco: Never   Vaping Use     Vaping Use: Never used   Substance and Sexual Activity     Alcohol use: Yes     Alcohol/week: 0.0 standard drinks     Comment: rare---red wine 3x per month     Drug use: No     Sexual activity: Not Currently     Partners: Female   Other Topics Concern     Parent/sibling w/ CABG, MI or angioplasty before 65F 55M? No   Social History Narrative     Not on file     Social Determinants of Health     Financial Resource Strain: Low Risk      Difficulty of Paying Living Expenses: Not very hard   Food Insecurity: No Food Insecurity     Worried About Running Out of Food in the Last Year: Never true     Ran Out of Food in the Last Year: Never true   Transportation Needs: No Transportation Needs     Lack of Transportation (Medical): No     Lack of Transportation (Non-Medical): No   Physical Activity: Sufficiently Active     Days of Exercise per Week: 4 days     Minutes of Exercise per Session: 40 min   Stress: No Stress Concern Present     Feeling of Stress : Not at all   Social Connections: Moderately Isolated     Frequency of Communication with Friends and Family: Once a week      Frequency of Social Gatherings with Friends and Family: Never     Attends Mandaeism Services: More than 4 times per year     Active Member of Clubs or Organizations: No     Attends Club or Organization Meetings: Not on file     Marital Status:    Intimate Partner Violence: Not on file   Housing Stability: Low Risk      Unable to Pay for Housing in the Last Year: No     Number of Places Lived in the Last Year: 2     Unstable Housing in the Last Year: No        FAMILY HISTORY:   Family History   Problem Relation Age of Onset     Arthritis Mother         SLE     Connective Tissue Disorder Mother         lupus     Diabetes Mother      Cerebrovascular Disease Mother      Cancer Mother      Diabetes Father      Diabetes Maternal Grandfather      Diabetes Sister         EXAM:Vitals: There were no vitals taken for this visit.  BMI= There is no height or weight on file to calculate BMI.      General appearance: Patient is alert and fully cooperative with history & exam.  No sign of distress is noted during the visit.      Respiratory: Breathing is regular & unlabored while sitting.      HEENT: Hearing is intact to spoken word.  Speech is clear.  No gross evidence of visual impairment that would impact ambulation.      Dermatologic:  -Submet 2 right foot ulcer smaller and drier. Close to closure. Fissure in appearance. 8 mm x 4 mm x 1 mm. Granular wound bed. No drainage. Periwound callus noted. Dorsal incision site, closed. Small open lesion adjacent to it. Slightly macerated. No cellulitis. Site drier and periwound healthier. Dorsal site incision intact and closed appropriately. No cellulitis.   Left foot submet 3 callus/preuclerative lesion, debrided to level of dermis. No open lesions or cellulitis.      Vascular: Dorsalis pedis and posterior tibial pulses are intact & regular bilaterally.  CFT and skin temperature is normal to both lower extremities.       Neurologic: Lower extremity sensation is diminished,  bilateral foot, to light touch.  No evidence of neurological-based weakness or contracture in the lower extremities.       Musculoskeletal: Patient is ambulatory without an assistive device or brace.  S/p right first ray resection. Right 2nd digit --> now more rectus.   S/p left hallux and second digit amputations     Psychiatric: Affect is pleasant & appropriate.    Imaging:          PROCEDURE:   Verbal consent was obtained for debridement. A 15 blade was used to excise the nonivable tissue to the preulcerative sites on both feet down to and including dermis. No noted purulence afterwards. Sites measures 1cm x 1 x .1 cm, in total. No probe to bone. Well tolerated. Site was cleaned with alcohol.       ASSESSMENT:  1. Right foot submet two ulcer s/p first ray resection --> now s/p 2nd metatarsal floating osteotomy with hammertoe correction (proximal phalanx excision, flexor tenotomy) --> ulcer close to closure     2. Left foot submet two ulcer  --> preulcerative --> remain stable.      MEDICAL DECISION MAKING:   -Patient seen for right foot. No significant pain or edema. Incision site intact. Sutures removed. Some noted draining, although no hayde dehiscence.   -Discussed need for continued minimal walking. Likely walking too much, causing swelling and incisional draining. Discussed with him that significant walking will cause site to worsen, although sounds to be improving.   -Debrided right and left foot preulcerative lesions   -Patient asks about rx for new CMOs. Sent today.   -Asks about golfing this weekend, discussed with him that that would be too much walking for healing osteotomy site.   -F/u in 2 weeks. Hopeful all sites are fully healed at that point.     Tiny Soto DPM   [place] Department of Podiatry/Foot & Ankle Surgery                    Again, thank you for allowing me to participate in the care of your patient.        Sincerely,        Tiny Soto DPM

## 2022-12-26 PROBLEM — K21.00 GASTROESOPHAGEAL REFLUX DISEASE WITH ESOPHAGITIS: Status: ACTIVE | Noted: 2022-12-26

## 2022-12-26 PROBLEM — I77.810 ASCENDING AORTA DILATATION (H): Status: ACTIVE | Noted: 2022-12-26

## 2023-01-09 ENCOUNTER — OFFICE VISIT (OUTPATIENT)
Dept: FAMILY MEDICINE | Facility: CLINIC | Age: 63
End: 2023-01-09
Payer: COMMERCIAL

## 2023-01-09 VITALS
RESPIRATION RATE: 16 BRPM | HEIGHT: 71 IN | TEMPERATURE: 97.7 F | BODY MASS INDEX: 32.9 KG/M2 | SYSTOLIC BLOOD PRESSURE: 136 MMHG | OXYGEN SATURATION: 95 % | WEIGHT: 235 LBS | DIASTOLIC BLOOD PRESSURE: 82 MMHG | HEART RATE: 66 BPM

## 2023-01-09 DIAGNOSIS — Z00.00 ROUTINE GENERAL MEDICAL EXAMINATION AT A HEALTH CARE FACILITY: ICD-10-CM

## 2023-01-09 DIAGNOSIS — E11.49 DIABETES MELLITUS TYPE 2 WITH NEUROLOGICAL MANIFESTATIONS (H): ICD-10-CM

## 2023-01-09 DIAGNOSIS — E78.5 HYPERLIPIDEMIA LDL GOAL <70: ICD-10-CM

## 2023-01-09 DIAGNOSIS — I77.810 MILD ASCENDING AORTA DILATATION (H): ICD-10-CM

## 2023-01-09 DIAGNOSIS — Z86.73 HISTORY OF STROKE: ICD-10-CM

## 2023-01-09 DIAGNOSIS — E11.52 TYPE 2 DIABETES MELLITUS WITH DIABETIC PERIPHERAL ANGIOPATHY AND GANGRENE, WITH LONG-TERM CURRENT USE OF INSULIN (H): Primary | ICD-10-CM

## 2023-01-09 DIAGNOSIS — E11.622 DIABETIC ULCER OF LOWER EXTREMITY (H): ICD-10-CM

## 2023-01-09 DIAGNOSIS — I10 BENIGN ESSENTIAL HYPERTENSION: ICD-10-CM

## 2023-01-09 DIAGNOSIS — D64.9 NORMOCYTIC ANEMIA: ICD-10-CM

## 2023-01-09 DIAGNOSIS — Z79.4 TYPE 2 DIABETES MELLITUS WITH DIABETIC PERIPHERAL ANGIOPATHY AND GANGRENE, WITH LONG-TERM CURRENT USE OF INSULIN (H): Primary | ICD-10-CM

## 2023-01-09 DIAGNOSIS — Z79.899 ON STATIN THERAPY: ICD-10-CM

## 2023-01-09 DIAGNOSIS — E78.5 HYPERLIPIDEMIA LDL GOAL <100: ICD-10-CM

## 2023-01-09 DIAGNOSIS — L97.909 DIABETIC ULCER OF LOWER EXTREMITY (H): ICD-10-CM

## 2023-01-09 DIAGNOSIS — E11.42 TYPE 2 DIABETES MELLITUS WITH PERIPHERAL NEUROPATHY (H): ICD-10-CM

## 2023-01-09 DIAGNOSIS — I73.9 PVD (PERIPHERAL VASCULAR DISEASE) (H): ICD-10-CM

## 2023-01-09 LAB
ALBUMIN SERPL BCG-MCNC: 4 G/DL (ref 3.5–5.2)
ALP SERPL-CCNC: 68 U/L (ref 40–129)
ALT SERPL W P-5'-P-CCNC: 21 U/L (ref 10–50)
ANION GAP SERPL CALCULATED.3IONS-SCNC: 14 MMOL/L (ref 7–15)
AST SERPL W P-5'-P-CCNC: 33 U/L (ref 10–50)
BASOPHILS # BLD AUTO: 0 10E3/UL (ref 0–0.2)
BASOPHILS NFR BLD AUTO: 1 %
BILIRUB SERPL-MCNC: 0.2 MG/DL
BUN SERPL-MCNC: 23.2 MG/DL (ref 8–23)
CALCIUM SERPL-MCNC: 9.4 MG/DL (ref 8.8–10.2)
CHLORIDE SERPL-SCNC: 106 MMOL/L (ref 98–107)
CHOLEST SERPL-MCNC: 107 MG/DL
CREAT SERPL-MCNC: 1.07 MG/DL (ref 0.67–1.17)
CREAT UR-MCNC: 127 MG/DL
DEPRECATED HCO3 PLAS-SCNC: 24 MMOL/L (ref 22–29)
EOSINOPHIL # BLD AUTO: 0.3 10E3/UL (ref 0–0.7)
EOSINOPHIL NFR BLD AUTO: 5 %
ERYTHROCYTE [DISTWIDTH] IN BLOOD BY AUTOMATED COUNT: 13.8 % (ref 10–15)
GFR SERPL CREATININE-BSD FRML MDRD: 79 ML/MIN/1.73M2
GLUCOSE SERPL-MCNC: 95 MG/DL (ref 70–99)
HCT VFR BLD AUTO: 35.7 % (ref 40–53)
HDLC SERPL-MCNC: 42 MG/DL
HGB BLD-MCNC: 11.2 G/DL (ref 13.3–17.7)
LDLC SERPL CALC-MCNC: 45 MG/DL
LYMPHOCYTES # BLD AUTO: 1.9 10E3/UL (ref 0.8–5.3)
LYMPHOCYTES NFR BLD AUTO: 29 %
MCH RBC QN AUTO: 28 PG (ref 26.5–33)
MCHC RBC AUTO-ENTMCNC: 31.4 G/DL (ref 31.5–36.5)
MCV RBC AUTO: 89 FL (ref 78–100)
MICROALBUMIN UR-MCNC: <12 MG/L
MICROALBUMIN/CREAT UR: NORMAL MG/G{CREAT}
MONOCYTES # BLD AUTO: 0.5 10E3/UL (ref 0–1.3)
MONOCYTES NFR BLD AUTO: 8 %
NEUTROPHILS # BLD AUTO: 3.7 10E3/UL (ref 1.6–8.3)
NEUTROPHILS NFR BLD AUTO: 57 %
NONHDLC SERPL-MCNC: 65 MG/DL
PLATELET # BLD AUTO: 188 10E3/UL (ref 150–450)
POTASSIUM SERPL-SCNC: 4.6 MMOL/L (ref 3.4–5.3)
PROT SERPL-MCNC: 7.4 G/DL (ref 6.4–8.3)
RBC # BLD AUTO: 4 10E6/UL (ref 4.4–5.9)
SODIUM SERPL-SCNC: 144 MMOL/L (ref 136–145)
TRIGL SERPL-MCNC: 99 MG/DL
WBC # BLD AUTO: 6.4 10E3/UL (ref 4–11)

## 2023-01-09 PROCEDURE — 80061 LIPID PANEL: CPT | Performed by: FAMILY MEDICINE

## 2023-01-09 PROCEDURE — 99213 OFFICE O/P EST LOW 20 MIN: CPT | Mod: 25 | Performed by: FAMILY MEDICINE

## 2023-01-09 PROCEDURE — 90682 RIV4 VACC RECOMBINANT DNA IM: CPT | Performed by: FAMILY MEDICINE

## 2023-01-09 PROCEDURE — 82570 ASSAY OF URINE CREATININE: CPT | Performed by: FAMILY MEDICINE

## 2023-01-09 PROCEDURE — 99396 PREV VISIT EST AGE 40-64: CPT | Mod: 25 | Performed by: FAMILY MEDICINE

## 2023-01-09 PROCEDURE — 36415 COLL VENOUS BLD VENIPUNCTURE: CPT | Performed by: FAMILY MEDICINE

## 2023-01-09 PROCEDURE — 90471 IMMUNIZATION ADMIN: CPT | Performed by: FAMILY MEDICINE

## 2023-01-09 PROCEDURE — 80053 COMPREHEN METABOLIC PANEL: CPT | Performed by: FAMILY MEDICINE

## 2023-01-09 PROCEDURE — 0134A COVID-19 VACCINE BIVALENT BOOSTER 18+ (MODERNA): CPT | Performed by: FAMILY MEDICINE

## 2023-01-09 PROCEDURE — 82043 UR ALBUMIN QUANTITATIVE: CPT | Performed by: FAMILY MEDICINE

## 2023-01-09 PROCEDURE — 91313 COVID-19 VACCINE BIVALENT BOOSTER 18+ (MODERNA): CPT | Performed by: FAMILY MEDICINE

## 2023-01-09 PROCEDURE — 85025 COMPLETE CBC W/AUTO DIFF WBC: CPT | Performed by: FAMILY MEDICINE

## 2023-01-09 RX ORDER — INSULIN ASPART 100 [IU]/ML
INJECTION, SOLUTION INTRAVENOUS; SUBCUTANEOUS
Qty: 30 ML | Refills: 0 | Status: SHIPPED | OUTPATIENT
Start: 2023-01-09 | End: 2023-05-01

## 2023-01-09 RX ORDER — METFORMIN HCL 500 MG
TABLET, EXTENDED RELEASE 24 HR ORAL
Qty: 360 TABLET | Refills: 0 | Status: SHIPPED | OUTPATIENT
Start: 2023-01-09 | End: 2023-04-10

## 2023-01-09 RX ORDER — ATORVASTATIN CALCIUM 40 MG/1
40 TABLET, FILM COATED ORAL DAILY
Qty: 90 TABLET | Refills: 4 | Status: SHIPPED | OUTPATIENT
Start: 2023-01-09 | End: 2023-01-11

## 2023-01-09 SDOH — ECONOMIC STABILITY: FOOD INSECURITY: WITHIN THE PAST 12 MONTHS, YOU WORRIED THAT YOUR FOOD WOULD RUN OUT BEFORE YOU GOT MONEY TO BUY MORE.: NEVER TRUE

## 2023-01-09 SDOH — HEALTH STABILITY: PHYSICAL HEALTH: ON AVERAGE, HOW MANY MINUTES DO YOU ENGAGE IN EXERCISE AT THIS LEVEL?: 50 MIN

## 2023-01-09 SDOH — ECONOMIC STABILITY: INCOME INSECURITY: IN THE LAST 12 MONTHS, WAS THERE A TIME WHEN YOU WERE NOT ABLE TO PAY THE MORTGAGE OR RENT ON TIME?: NO

## 2023-01-09 SDOH — ECONOMIC STABILITY: INCOME INSECURITY: HOW HARD IS IT FOR YOU TO PAY FOR THE VERY BASICS LIKE FOOD, HOUSING, MEDICAL CARE, AND HEATING?: NOT VERY HARD

## 2023-01-09 SDOH — ECONOMIC STABILITY: FOOD INSECURITY: WITHIN THE PAST 12 MONTHS, THE FOOD YOU BOUGHT JUST DIDN'T LAST AND YOU DIDN'T HAVE MONEY TO GET MORE.: NEVER TRUE

## 2023-01-09 SDOH — HEALTH STABILITY: PHYSICAL HEALTH: ON AVERAGE, HOW MANY DAYS PER WEEK DO YOU ENGAGE IN MODERATE TO STRENUOUS EXERCISE (LIKE A BRISK WALK)?: 5 DAYS

## 2023-01-09 ASSESSMENT — ENCOUNTER SYMPTOMS
COUGH: 0
ARTHRALGIAS: 0
SORE THROAT: 0
SHORTNESS OF BREATH: 0
ABDOMINAL PAIN: 0
DIARRHEA: 0
CHILLS: 0
NERVOUS/ANXIOUS: 0
PARESTHESIAS: 0
FREQUENCY: 0
HEMATURIA: 0
NAUSEA: 0
MYALGIAS: 0
FEVER: 0
JOINT SWELLING: 0
DIZZINESS: 0
CONSTIPATION: 0
EYE PAIN: 0
HEMATOCHEZIA: 0
HEADACHES: 0
PALPITATIONS: 0
DYSURIA: 0
HEARTBURN: 0
WEAKNESS: 0

## 2023-01-09 ASSESSMENT — SOCIAL DETERMINANTS OF HEALTH (SDOH)
HOW OFTEN DO YOU ATTEND CHURCH OR RELIGIOUS SERVICES?: MORE THAN 4 TIMES PER YEAR
IN A TYPICAL WEEK, HOW MANY TIMES DO YOU TALK ON THE PHONE WITH FAMILY, FRIENDS, OR NEIGHBORS?: TWICE A WEEK
DO YOU BELONG TO ANY CLUBS OR ORGANIZATIONS SUCH AS CHURCH GROUPS UNIONS, FRATERNAL OR ATHLETIC GROUPS, OR SCHOOL GROUPS?: YES
HOW OFTEN DO YOU GET TOGETHER WITH FRIENDS OR RELATIVES?: NEVER

## 2023-01-09 ASSESSMENT — LIFESTYLE VARIABLES
AUDIT-C TOTAL SCORE: 1
HOW OFTEN DO YOU HAVE A DRINK CONTAINING ALCOHOL: MONTHLY OR LESS
SKIP TO QUESTIONS 9-10: 1
HOW OFTEN DO YOU HAVE SIX OR MORE DRINKS ON ONE OCCASION: NEVER
HOW MANY STANDARD DRINKS CONTAINING ALCOHOL DO YOU HAVE ON A TYPICAL DAY: 1 OR 2

## 2023-01-09 NOTE — TELEPHONE ENCOUNTER
Most recent nurse triage note reviewed. Refill of atorvastatin done, which patient has previously tolerated.

## 2023-01-09 NOTE — TELEPHONE ENCOUNTER
Routing refill request to provider for review/approval because:  Drug interaction warning    Allergy/Contraindication:SimvastatinReactions:Swelling  Allergy/Contraindication:Hmg-coa-r InhibitorsReactions:Swelling    Teresa Lakhani R.N.

## 2023-01-09 NOTE — PROGRESS NOTES
SUBJECTIVE:   CC: Judie is an 62 year old who presents for preventative health visit.     Patient has been advised of split billing requirements and indicates understanding: Yes  Healthy Habits:     Getting at least 3 servings of Calcium per day:  NO    Bi-annual eye exam:  Yes    Dental care twice a year:  Yes    Sleep apnea or symptoms of sleep apnea:  None    Diet:  Diabetic    Frequency of exercise:  4-5 days/week    Duration of exercise:  45-60 minutes    Taking medications regularly:  Yes    Medication side effects:  Not applicable, None, No muscle aches and No significant flushing    PHQ-2 Total Score: 0    Additional concerns today:  Yes              Today's PHQ-2 Score:   PHQ-2 ( 1999 Pfizer) 1/9/2023   Q1: Little interest or pleasure in doing things 0   Q2: Feeling down, depressed or hopeless 0   PHQ-2 Score 0   PHQ-2 Total Score (12-17 Years)- Positive if 3 or more points; Administer PHQ-A if positive -   Q1: Little interest or pleasure in doing things Not at all   Q2: Feeling down, depressed or hopeless Not at all   PHQ-2 Score 0       Have you ever done Advance Care Planning? (For example, a Health Directive, POLST, or a discussion with a medical provider or your loved ones about your wishes): Patient has plans to discuss this subject with his wife.    Social History     Tobacco Use     Smoking status: Never     Smokeless tobacco: Never   Substance Use Topics     Alcohol use: Not Currently     Comment: rare---red wine 3x per month         Alcohol Use 1/9/2023   Prescreen: >3 drinks/day or >7 drinks/week? No   Prescreen: >3 drinks/day or >7 drinks/week? -       Last PSA:   PSA   Date Value Ref Range Status   12/05/2016 0.31 0 - 4 ug/L Final     Comment:     Assay Method:  Chemiluminescence using Siemens Vista analyzer     Prostate Specific Antigen Screen   Date Value Ref Range Status   01/03/2022 0.44 0.00 - 4.00 ug/L Final       Reviewed orders with patient. Reviewed health maintenance and updated orders  "accordingly - Yes      Reviewed and updated as needed this visit by clinical staff   Tobacco  Allergies  Meds     [facility] Hx          Reviewed and updated as needed this visit by Provider         [facility]             Review of Systems   Constitutional: Negative for chills and fever.   HENT: Negative for congestion, ear pain, hearing loss and sore throat.    Eyes: Negative for pain and visual disturbance.   Respiratory: Negative for cough and shortness of breath.    Cardiovascular: Negative for chest pain, palpitations and peripheral edema.   Gastrointestinal: Negative for abdominal pain, constipation, diarrhea, heartburn, hematochezia and nausea.   Genitourinary: Positive for impotence. Negative for dysuria, frequency, genital sores, hematuria, penile discharge and urgency.   Musculoskeletal: Negative for arthralgias, joint swelling and myalgias.   Skin: Negative for rash.   Neurological: Negative for dizziness, weakness, headaches and paresthesias.   Psychiatric/Behavioral: Negative for mood changes. The patient is not nervous/anxious.      He is concerned his hemoglobin has been decreasing.  Lab order done to recheck this.    Patient declined advance care planning. He will discuss this with his wife.     BMP for cardiology was changed to CMP, which they can access.  This was done because patient needs to have his liver function assessed while on statin therapy.    We discussed his management of foot wounds.  Management is being done through podiatry specialist.  The dressing was left on right foot during this visit.  Dressing was removed from the left foot over plantar surface of forefoot, where he has a superficial skin ulcer being treated.  Dressing was replaced here before leaving.    At time of visit, patient feels well overall.      OBJECTIVE:   /82 (Cuff Size: Adult Large)   Pulse 66   Temp 97.7  F (36.5  C)   Resp 16   Ht 1.803 m (5' 11\")   Wt 106.6 kg (235 lb)   SpO2 95%   BMI 32.78 kg/m  "     Physical Exam  General: Vital signs reviewed.  Patient is in no acute appearing distress.  Breathing appears nonlabored.  Patient is alert and oriented ×3.      ENT: Ear exam shows bilateral tympanic membranes to be clear without injection, nasal turbinates show no injection or edema, no pharyngeal injection or exudate.    Neck: supple with no adenoapthy, palpable abnormal masses, or thyroid abnormality.    Eyes: No scleral, lid, or periorbital injection or edema noted.  No eye mattering noted.  Corneas are clear. Pupils are equal round and reactive to light with normal consensual eye movement.    Heart: Heart rate is regular without murmur.    Lungs: Lungs are clear to auscultation with good airflow bilaterally.    Abdomen:  Abdomen is soft, nontender.  No palpable abnormal masses or organomegaly.  Bowel sounds are normal.    Genital exam: Patient declined exam for possible hernia.    Back: No areas of tenderness.    Skin: Warm and dry, with no rash or abnormal lesions noted.    Extremities: No lower leg edema noted.  No joint edema or restricted range of motion noted. Left foot exam: patial sensation plantar surface. Has superficial ulceration mid-forfoot measuring about 3 x 4 cm. No purulent drainage.  Examination of left foot otherwise shows good sensation of remaining toes, and surrounding foot.  Skin is normal temperature, and he has a palpable dorsalis pedis pulse.    Neuro: No acute focal deficits or other abnormalities noted.    Psych: Patient is very pleasant, making good eye contact, with clear and fluent speech.  Answers questions appropriately. No psychomotor agitation.         ASSESSMENT/PLAN:   Crispin was seen today for physical.    Diagnoses and all orders for this visit:    Routine general medical examination at a health care facility    Type 2 diabetes mellitus with peripheral neuropathy (H)  -     Lipid Profile  -     Albumin Random Urine Quantitative with Creat Ratio    Diabetic ulcer of  "lower extremity (H)    Benign essential hypertension  -     Lipid Profile  -     Comprehensive metabolic panel    Hyperlipidemia LDL goal <70  -     Lipid Profile    BMI 35.0-35.9,adult  -     Lipid Profile    Mild ascending aorta dilatation (H)  -     Lipid Profile    History of stroke  -     Lipid Profile    PVD (peripheral vascular disease) (H)  -     Lipid Profile    Normocytic anemia  -     CBC with Platelets & Differential    On statin therapy  -     Comprehensive metabolic panel    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)  -     [id number] - Urine Drug Confirmation Panel (Comprehensive); Future    Other orders  -     INFLUENZA VACCINE 50-64 OR 18-64 W/EGG ALLERGY (FLUBLOK)  -     REVIEW OF HEALTH MAINTENANCE PROTOCOL ORDERS  -     INFLUENZA QUAD, RECOMBINANT, P-FREE (RIV4) (FLUBLOK)  -     COVID-19,PF,MODERNA (18+ YRS PRIMARY SERIES .5ML)  -     COVID-19 VACCINE BIVALENT BOOSTER 18+ (MODERNA)  -     INFLUENZA VACCINE 50-64 OR 18-64 W/EGG ALLERGY (FLUBLOK)              COUNSELING:   Reviewed preventive health counseling, as reflected in patient instructions      BMI:   Estimated body mass index is 32.78 kg/m  as calculated from the following:    Height as of this encounter: 1.803 m (5' 11\").    Weight as of this encounter: 106.6 kg (235 lb).         He reports that he has never smoked. He has never used smokeless tobacco.            DO EDDIE [facility]  "

## 2023-01-11 ENCOUNTER — TELEPHONE (OUTPATIENT)
Dept: PODIATRY | Facility: CLINIC | Age: 63
End: 2023-01-11

## 2023-01-11 ENCOUNTER — OFFICE VISIT (OUTPATIENT)
Dept: PODIATRY | Facility: CLINIC | Age: 63
End: 2023-01-11
Payer: COMMERCIAL

## 2023-01-11 ENCOUNTER — ANCILLARY PROCEDURE (OUTPATIENT)
Dept: GENERAL RADIOLOGY | Facility: CLINIC | Age: 63
End: 2023-01-11
Attending: PODIATRIST
Payer: COMMERCIAL

## 2023-01-11 ENCOUNTER — OFFICE VISIT (OUTPATIENT)
Dept: CARDIOLOGY | Facility: CLINIC | Age: 63
End: 2023-01-11
Payer: COMMERCIAL

## 2023-01-11 VITALS
WEIGHT: 235 LBS | OXYGEN SATURATION: 96 % | BODY MASS INDEX: 31.83 KG/M2 | HEART RATE: 63 BPM | HEIGHT: 72 IN | DIASTOLIC BLOOD PRESSURE: 72 MMHG | SYSTOLIC BLOOD PRESSURE: 146 MMHG

## 2023-01-11 VITALS — DIASTOLIC BLOOD PRESSURE: 72 MMHG | WEIGHT: 235 LBS | SYSTOLIC BLOOD PRESSURE: 146 MMHG | BODY MASS INDEX: 32.32 KG/M2

## 2023-01-11 DIAGNOSIS — E78.5 HYPERLIPIDEMIA LDL GOAL <70: ICD-10-CM

## 2023-01-11 DIAGNOSIS — E11.42 TYPE 2 DIABETES MELLITUS WITH PERIPHERAL NEUROPATHY (H): ICD-10-CM

## 2023-01-11 DIAGNOSIS — M79.671 RIGHT FOOT PAIN: Primary | ICD-10-CM

## 2023-01-11 DIAGNOSIS — I10 BENIGN ESSENTIAL HYPERTENSION: ICD-10-CM

## 2023-01-11 DIAGNOSIS — L97.512 RIGHT FOOT ULCER, WITH FAT LAYER EXPOSED (H): ICD-10-CM

## 2023-01-11 DIAGNOSIS — Z86.73 HISTORY OF STROKE: ICD-10-CM

## 2023-01-11 DIAGNOSIS — M20.41 HAMMER TOE OF RIGHT FOOT: ICD-10-CM

## 2023-01-11 DIAGNOSIS — I77.810 MILD ASCENDING AORTA DILATATION (H): Primary | ICD-10-CM

## 2023-01-11 DIAGNOSIS — M79.671 RIGHT FOOT PAIN: ICD-10-CM

## 2023-01-11 PROBLEM — K74.60 CIRRHOSIS OF LIVER (H): Status: RESOLVED | Noted: 2022-07-08 | Resolved: 2023-01-11

## 2023-01-11 PROBLEM — K57.90 DIVERTICULAR DISEASE: Status: RESOLVED | Noted: 2021-11-17 | Resolved: 2023-01-11

## 2023-01-11 PROBLEM — K21.00 GASTROESOPHAGEAL REFLUX DISEASE WITH ESOPHAGITIS: Status: RESOLVED | Noted: 2022-12-26 | Resolved: 2023-01-11

## 2023-01-11 PROBLEM — R10.13 EPIGASTRIC PAIN: Status: RESOLVED | Noted: 2022-07-08 | Resolved: 2023-01-11

## 2023-01-11 PROBLEM — K59.00 CONSTIPATION: Status: RESOLVED | Noted: 2022-07-08 | Resolved: 2023-01-11

## 2023-01-11 PROBLEM — R11.2 NAUSEA AND VOMITING: Status: RESOLVED | Noted: 2022-07-08 | Resolved: 2023-01-11

## 2023-01-11 PROCEDURE — 99214 OFFICE O/P EST MOD 30 MIN: CPT | Performed by: INTERNAL MEDICINE

## 2023-01-11 PROCEDURE — 11042 DBRDMT SUBQ TIS 1ST 20SQCM/<: CPT | Mod: 58 | Performed by: PODIATRIST

## 2023-01-11 PROCEDURE — 99213 OFFICE O/P EST LOW 20 MIN: CPT | Mod: 25 | Performed by: PODIATRIST

## 2023-01-11 PROCEDURE — 99024 POSTOP FOLLOW-UP VISIT: CPT | Performed by: PODIATRIST

## 2023-01-11 PROCEDURE — 73630 X-RAY EXAM OF FOOT: CPT | Mod: TC | Performed by: RADIOLOGY

## 2023-01-11 RX ORDER — HYDROCHLOROTHIAZIDE 12.5 MG/1
12.5 CAPSULE ORAL EVERY MORNING
Qty: 100 CAPSULE | Refills: 5 | Status: SHIPPED | OUTPATIENT
Start: 2023-01-11 | End: 2023-02-09

## 2023-01-11 RX ORDER — APIXABAN 5 MG/1
5 TABLET, FILM COATED ORAL 2 TIMES DAILY
Qty: 200 TABLET | Refills: 5 | Status: SHIPPED | OUTPATIENT
Start: 2023-01-11 | End: 2023-07-10

## 2023-01-11 RX ORDER — ATORVASTATIN CALCIUM 40 MG/1
40 TABLET, FILM COATED ORAL DAILY
Qty: 100 TABLET | Refills: 5 | Status: SHIPPED | OUTPATIENT
Start: 2023-01-11 | End: 2023-02-09

## 2023-01-11 RX ORDER — AMLODIPINE BESYLATE 5 MG/1
5 TABLET ORAL 2 TIMES DAILY
Qty: 200 TABLET | Refills: 5 | Status: SHIPPED | OUTPATIENT
Start: 2023-01-11 | End: 2023-02-09

## 2023-01-11 RX ORDER — LISINOPRIL 40 MG/1
40 TABLET ORAL DAILY
Qty: 100 TABLET | Refills: 5 | Status: SHIPPED | OUTPATIENT
Start: 2023-01-11 | End: 2023-02-09

## 2023-01-11 RX ORDER — FUROSEMIDE 20 MG
20 TABLET ORAL DAILY PRN
Qty: 50 TABLET | Refills: 5 | Status: CANCELLED | OUTPATIENT
Start: 2023-01-11

## 2023-01-11 RX ORDER — CIPROFLOXACIN 500 MG/1
500 TABLET, FILM COATED ORAL 2 TIMES DAILY
Qty: 16 TABLET | Refills: 0 | Status: ON HOLD | OUTPATIENT
Start: 2023-01-11 | End: 2023-01-19

## 2023-01-11 NOTE — LETTER
1/11/2023         RE: Crispin Rojas  [address] [facility] [address]        Dear Colleague,    Thank you for referring your patient, Crispin Rojas, to the [facility]. Please see a copy of my visit note below.    [facility]  CLINIC NOTE    CHIEF COMPLAINT:  right foot    PATIENT HISTORY:  Crispin Rojas is a 62 year old male who follows up for right foot. Had procedure on 11/30 to offload submet 2 ulcer. Is back to work and has been traveling a fair amount. At last appt, plantar ulcer was much smaller and close to closure. Had been dry. There was only minimal drainage to the dorsal incision site at that time. -States following that appt, on 12/28, he started having a new site drain on the right foot. States a large piece of skin was peeled off. Has been draining a lot since then. Denies pain. Denies N/F/V/C/D. Overall feels well. Plans to go to [facility] this weekend and is hoping to golf.     Review of Systems:  A 10 point review of systems was performed and is positive for that noted above in the patient history.  All other areas are negative.     PAST MEDICAL HISTORY:   Past Medical History:   Diagnosis Date     Ascending aorta dilatation (H)      Cerebral infarction (H)     2010     Gastroesophageal reflux disease with esophagitis      H/O blood clots 2022     Hyperlipidemia LDL goal <70      Hypertension      Numbness and tingling      Obesity      GILA (obstructive sleep apnea) 10/22/2018    CPAP intolerant     PVD (peripheral vascular disease) (H)     s/p left partial toe amputation     Renal stone      Tremor      Type 2 diabetes mellitus with diabetic polyneuropathy, with long-term current use of insulin (H)         PAST SURGICAL HISTORY:   Past Surgical History:   Procedure Laterality Date     AMPUTATE FOOT Left 8/18/2016    Procedure: AMPUTATE FOOT;  Surgeon: Jack Younger DPM;  Location: [facility]     AMPUTATE TOE(S) Right  2/1/2016    Procedure: AMPUTATE TOE(S);  Surgeon: Rachelle Manriquez DPM, Pod;  Location: [facility]     AMPUTATE TOE(S) Right 8/2/2019    Procedure: Right fourth toe partial amputation for treatment of osteomyelitis.;  Surgeon: Jack Younger DPM;  Location: [facility]     AMPUTATE TOE(S) Left 11/8/2019    Procedure: Left second toe amputation at the metatarsophalangeal joint.;  Surgeon: Rachelle Manriquez DPM, Podiatry/Foot and Ankle Surgery;  Location: [facility]     AMPUTATE TOE(S) Right 6/5/2022    Procedure: AMPUTATION OF RIGHT GREAT TOE;  Surgeon: Wili Quiles DPM;  Location: [facility]     AMPUTATE TOE(S) Right 7/28/2022    Procedure: Right foot partial first metatarsal resection;  Surgeon: Tiny Soto DPM;  Location: [facility]     ANGIOGRAM       BIOPSY BONE FOOT Right 7/24/2022    Procedure: Bone biopsy, right first metatarsal.;  Surgeon: Valentino Molina DPM;  Location: [facility]     COLONOSCOPY       COMBINED CYSTOSCOPY, RETROGRADES, URETEROSCOPY, INSERT STENT Left 8/21/2016    Procedure: COMBINED CYSTOSCOPY, RETROGRADES, URETEROSCOPY, INSERT STENT;  Surgeon: Artemio Valenzuela MD;  Location: [facility]     COMBINED CYSTOSCOPY, RETROGRADES, URETEROSCOPY, LASER HOLMIUM LITHOTRIPSY URETER(S), INSERT STENT Left 10/3/2016    Procedure: COMBINED CYSTOSCOPY, RETROGRADES, URETEROSCOPY, LASER HOLMIUM LITHOTRIPSY URETER(S), INSERT STENT;  Surgeon: Artemio Valenzuela MD;  Location: [facility]     EXTRACORPOREAL SHOCK WAVE LITHOTRIPSY (ESWL) Left 9/8/2016    Procedure: EXTRACORPOREAL SHOCK WAVE LITHOTRIPSY (ESWL);  Surgeon: Artemio Valenzuela MD;  Location: [facility]     INCISION AND DRAINAGE FOOT, COMBINED Right 7/24/2022    Procedure: Incision and drainage, right foot ;  Surgeon: Valentino Molina DPM;  Location: [facility]     OSTEOTOMY FOOT Right 11/30/2022    Procedure: 1. Right second metatarsal floating osteotomy  2. Right second digit proximal phalanx arthroplasty 3. Right percutaneous flexor tenotomy;  Surgeon:  Tiny Soto DPM;  Location: [facility]     RECESSION GASTROCNEMIUS Right 2/1/2016    Procedure: RECESSION GASTROCNEMIUS;  Surgeon: Rachelle Manriquez DPM, Pod;  Location: [facility]        MEDICATIONS:  Reviewed in Epic.     ALLERGIES:    Allergies   Allergen Reactions     Hmg-Coa-R Inhibitors Swelling     Other reaction(s): Other (see comments)  SIMCOR caused edema in extremities       Bactrim [Sulfamethoxazole W/Trimethoprim] Nausea and Vomiting     Sulfa Drugs Nausea     Niacin Swelling     SIMCOR caused edema in extremities     Simvastatin Swelling     SIMCOR caused edema in extremities        SOCIAL HISTORY:   Social History     Socioeconomic History     Marital status:      Spouse name: Sydney     Number of children: 2     Years of education: Not on file     Highest education level: Master's degree (e.g., MA, MS, [place], MEd, MSW, ELIANA)   Occupational History     Occupation: marketing     Employer: [employer]     Comment: [employer]   Tobacco Use     Smoking status: Never     Smokeless tobacco: Never   Vaping Use     Vaping Use: Never used   Substance and Sexual Activity     Alcohol use: Not Currently     Comment: rare---red wine 3x per month     Drug use: Never     Sexual activity: Not Currently     Partners: Female   Other Topics Concern     Parent/sibling w/ CABG, MI or angioplasty before 65F 55M? No   Social History Narrative     Not on file     Social Determinants of Health     Financial Resource Strain: Low Risk      Difficulty of Paying Living Expenses: Not very hard   Food Insecurity: No Food Insecurity     Worried About Running Out of Food in the Last Year: Never true     Ran Out of Food in the Last Year: Never true   Transportation Needs: No Transportation Needs     Lack of Transportation (Medical): No     Lack of Transportation (Non-Medical): No   Physical Activity: Sufficiently Active     Days of Exercise per Week: 5 days     Minutes of Exercise per Session: 50 min   Stress: No Stress Concern  Present     Feeling of Stress : Not at all   Social Connections: Moderately Integrated     Frequency of Communication with Friends and Family: Twice a week     Frequency of Social Gatherings with Friends and Family: Never     Attends [employer] Services: More than 4 times per year     Active Member of Clubs or Organizations: Yes     Attends Club or Organization Meetings: Not on file     Marital Status:    Intimate Partner Violence: Not on file   Housing Stability: Low Risk      Unable to Pay for Housing in the Last Year: No     Number of Places Lived in the Last Year: 2     Unstable Housing in the Last Year: No        FAMILY HISTORY:   Family History   Problem Relation Age of Onset     Arthritis Mother         SLE     Connective Tissue Disorder Mother         lupus     Diabetes Mother      Cerebrovascular Disease Mother      Cancer Mother      Diabetes Father      Coronary Artery Disease Father      Chronic Obstructive Pulmonary Disease Father      Diabetes Sister      Diabetes Maternal Grandfather         EXAM:Vitals: There were no vitals taken for this visit.  BMI= There is no height or weight on file to calculate BMI.      General appearance: Patient is alert and fully cooperative with history & exam.  No sign of distress is noted during the visit.      Respiratory: Breathing is regular & unlabored while sitting.      HEENT: Hearing is intact to spoken word.  Speech is clear.  No gross evidence of visual impairment that would impact ambulation.      Dermatologic:  New ulcer submet 3. Significantly larger in size and draining green fluid. Appears to be adjacent to previous site, which now is healed. No probe to bone. Does probe to subcutaneous tissue. No cellulitis.   No further drainage to second incision site, well healed.     Left foot submet 3 callus/preuclerative lesion, debrided to level of dermis. No open lesions or cellulitis.      Vascular: Dorsalis pedis and posterior tibial pulses are intact &  regular bilaterally.  CFT and skin temperature is normal to both lower extremities.       Neurologic: Lower extremity sensation is diminished, bilateral foot, to light touch.  No evidence of neurological-based weakness or contracture in the lower extremities.       Musculoskeletal: Patient is ambulatory without an assistive device or brace.  S/p right first ray resection. Right 2nd digit --> now more rectus.   S/p left hallux and second digit amputations     Psychiatric: Affect is pleasant & appropriate.          Imaging:     I personally reviewed the images and agree with the reports as stated.    IMPRESSION: Interval osseous resorption and callus formation adjacent  to the distal second metatarsal osteotomy. Dorsal displacement of the  second metatarsal head. Soft tissue swelling of the second and third  toes in the region of the distal first ray. Differential  considerations include posttraumatic/neuropathic changes versus  osteomyelitis. Resection of the base of the second toe proximal  phalanx. Partial resection of the first ray at the level of the  proximal metatarsal diaphysis. Partial resection of the fourth toe at  the level of the head of the proximal phalanx.    PROCEDURE:   Verbal consent was obtained for debridement. A 15 blade was used to excise the nonivable tissue to the ulcer, down to and including subcutaneous tissue. No noted purulence afterwards. Ulcer measures 3cm x .3 x .4 cm. No probe to bone. Well tolerated. Site was cleansed with alcohol.       ASSESSMENT:  1. Right foot submet three ulcer --> new since last appt     2. Left foot submet two ulcer  --> preulcerative --> remain stable.      MEDICAL DECISION MAKING:   -Patient seen today. At last appt, had been doing well and almost all ulcers were healed. He went on vacation and missed last appt due to snow.   -Today has new, significantly larger ulcer to submet 3. This is likely to transfer pressure from previous surgery, unusual anatomy and  significant ambulation on right foot.   -He plans to go on vacation this weekend. Discussed risks of this, which include worsening infection while he's abroad, requiring hospitalization. Other risks are worsening ulceration, requiring hospitalization when he returns.   -He states he wants to go on his trip. Cipro prescribed due to green drainage. Discussed that he must be off of his foot at all times. New CAM boot dispensed.   -Discussed need to offload third and fourth metatarsals with osteotomies. Also discussed option for transmetatarsal amputation, he declines this. This is being scheduled somewhat urgently, on Thursday next week. Did discuss that if site appears worse next week, will need to remove metatarsal head to insure there's no bone infection.   -He agrees with these plans.     -Risks for worsening infection, need for amputation and limb loss were discussed in detail.     Tiny Soto DPM   [facility] Ankle [facility]      Greater than 30 minutes were spent today, reviewing previous hospital records, counseling and educating the patient on the ongoing treatment plan and coordinating care.                 Again, thank you for allowing me to participate in the care of your patient.        Sincerely,        Tiny Soto DPM

## 2023-01-11 NOTE — TELEPHONE ENCOUNTER
Scheduled surgery    Type of surgery: right foot metatarsal osteotomies, hammer toe repair  Location of surgery: [facility]  Date and time of surgery: 1/19/23  Surgeon: Brittany  Pre-Op Appt Date: 1/12/23  Post-Op Appt Date: 1/25/23   Packet sent out: Yes  Pre-cert/Authorization completed:  No  Date: 1/11/23      Gay Weber, Surgery Scheduler

## 2023-01-11 NOTE — PROGRESS NOTES
Service Date: 01/11/2023    PRIMARY CARE PROVIDER:  Johann Serna DO    REASON FOR VISIT:  Followup of hypertension, hyperlipidemia, ascending aorta dilatation.     HISTORY OF PRESENT ILLNESS:    It was my pleasure to follow up with Judie, a delightful, 62-year-old  male, not accompanied today, lives with his wife, owns a , also works on a golf course and a restaurant.     He is known to have hypertension, suffered a stroke in 2010 (treated medically with no residual sequelae), type 2 diabetes mellitus of over 2 decades (on insulin, well controlled, complicated by neuropathy, status post left partial toe amputation), hyperlipidemia with goal LDL less than 70, obstructive sleep apnea (CPAP intolerant), BMI 32, right bundle branch block, mild aortic root and ascending aorta dilatation that has been stable, never tobacco user.     The following were addressed today:    1.  Hypertension, suboptimally controlled.   BP today is 146/72 (personally rechecked).  At home, it has been in the 150s systolic over the last few weeks in the context of him injuring his foot when he climbed up a stepladder, underwent podiatric surgery, has been less active and also ran out of his lisinopril and hydrochlorothiazide, which he did not take today.     2.  Hyperlipidemia with goal LDL less than 70.    On atorvastatin 40 mg daily.  Total cholesterol 107, HDL 42, LDL 45, triglycerides 99.     3.  History of recent DVT in 2022.   He has been started on Eliquis by Dr. Serna.  He is also on aspirin 81 mg.     4.  Ascending aorta and aortic root dilatation.    This has been under surveillance.  On his echocardiogram last year, his EF was 55%-60%, aortic root measured 4.1 cm, ascending aorta 4.2 cm, and aortic valve was trileaflet without regurgitation or stenosis.  Right ventricular systolic function is normal.  No family history of aortopathy.     DIAGNOSTIC DATA:    I personally reviewed the results of labs and ECG, which  shows sinus rhythm with right bundle branch block and left axis.   I personally reviewed images and independently interpreted his transthoracic echocardiogram as documented above.      DIAGNOSES:  1.  Aortic root and ascending aorta dilatation.  Per updated ACC/AHA aorta guidelines, he may be reclassified with a confirmatory test to normal dimensions, but will need a CT angiogram for it.   2.  Hypertension.  Not well controlled.  We will reinstitute his medications, follow up and see if they need to be up titrated.   3.  Hyperlipidemia with goal LDL less than 70.  LDL is 45.  Continue atorvastatin 40 mg daily.   4.  Antiplatelet and anticoagulant management.  To decrease bleeding risk, I will stop aspirin since he is supposedly on long-term Eliquis.    PLAN:   1.  Medications:  - Stop aspirin 81 mg as he is on Eliquis.   - Stop potassium supplements as serum potassium is normal, and he is no longer taking p.r.n. furosemide for leg swelling.   - Renewed prescriptions for antihypertensive agents and atorvastatin.     2.  Orders placed:  - CT angiogram.   - Follow up with me to review results.    3.  Patient education and counseling.  - Talked about trying CPAP again.  He has done this many times and is simply intolerant.  He has also lost weight over the last few years and feels that his sleep apnea may be better.  -  Hopefully, once his foot has healed, he will go back to exercising regularly, which is important for his disease management and overall cardiovascular risk.     Established patient.  High complexity medical decision making.    cc:  Johann Serna DO  [address] Emeli Theodore MD        D: 2023   T: 2023   MT: [email or web address]    Name:     CEDRIC LAW  MRN:      [id number]        Account:      [id number]   :      1960           Service Date: 2023       Document: [id number]

## 2023-01-11 NOTE — PATIENT INSTRUCTIONS
1.  Stop aspirin as you are on Eliquis.    2.  CT angiogram of the chest to assess your ascending aorta dilatation.    3.  Follow-up with Dr. Theodore to discuss results.    If you have any questions or concerns, please contact my nurses at [phone].

## 2023-01-11 NOTE — PROGRESS NOTES
"  Clinic visit note dictated. Dictation reference number - [id number]      Today's clinic visit entailed:  Review of the result(s) of each unique test - ECG, labs  The following tests were independently interpreted by me as noted in my documentation: Echocardiogram images  Ordering of each unique test  Prescription drug management        Encounter Diagnoses   Name Primary?     Benign essential hypertension      Hyperlipidemia LDL goal <70      Type 2 diabetes mellitus with peripheral neuropathy (H)      BMI 35.0-35.9,adult      Mild ascending aorta dilatation (H) Yes     History of stroke          Orders Placed This Encounter   Procedures     CT Chest Angio w/o & w Contrast     Follow-Up with Cardiology         Vitals: BP (!) 146/72   Pulse 63   Ht 1.816 m (5' 11.5\")   Wt 106.6 kg (235 lb)   SpO2 96%   BMI 32.32 kg/m    Wt Readings from Last 5 Encounters:   01/11/23 106.6 kg (235 lb)   01/09/23 106.6 kg (235 lb)   12/21/22 105.2 kg (232 lb)   12/07/22 106.1 kg (234 lb)   11/30/22 106.1 kg (234 lb)           CURRENT MEDICATIONS:  Current Outpatient Medications   Medication Sig Dispense Refill     acetaminophen (TYLENOL) 325 MG tablet Take 2 tablets (650 mg) by mouth every 6 hours as needed for mild pain or other (and adjunct with moderate or severe pain or per patient request)       amLODIPine (NORVASC) 5 MG tablet Take 1 tablet (5 mg) by mouth 2 times daily Take one tablet twice daily  SEE MD THIS QUARTER 200 tablet 5     apixaban ANTICOAGULANT (ELIQUIS) 5 MG tablet Take 1 tablet (5 mg) by mouth 2 times daily 60 tablet      atorvastatin (LIPITOR) 40 MG tablet Take 1 tablet (40 mg) by mouth daily Take one tablet daily SEE PROVIDER FOR NEXT REFILL 100 tablet 5     blood glucose (NO BRAND SPECIFIED) lancets standard Use to test blood sugar 3 times daily or as directed. 300 each 3     calcium carbonate (OS-NARA) 500 MG tablet Take 1 tablet by mouth 2 times daily       cephALEXin (KEFLEX) 500 MG capsule TAKE 1 CAPSULE " BY MOUTH THREE TIMES DAILY X 3 MONTHS       Cholecalciferol (VITAMIN D3) 25 MCG (1000 UT) CAPS Take 1,000 Units by mouth daily        Co-Enzyme Q10 100 MG CAPS Take 100 mg by mouth daily        collagenase (SANTYL) 250 UNIT/GM external ointment Apply topically daily 30 g 1     Continuous Blood Gluc Sensor (FREESTYLE LUCERO 14 DAY SENSOR) MISC USE ONE EVERY 14 DAYS 6 each 3     ELIQUIS ANTICOAGULANT 5 MG tablet Take 1 tablet (5 mg) by mouth 2 times daily 200 tablet 5     gabapentin (NEURONTIN) 100 MG capsule Take 400 mg by mouth 3 times daily       hydrochlorothiazide (MICROZIDE) 12.5 MG capsule Take 1 capsule (12.5 mg) by mouth every morning 100 capsule 5     Insulin Aspart FlexPen 100 UNIT/ML SOPN USE DAILY WITH MEALS, USING UP TO 70 UNITS PER DAY 30 mL 0     insulin degludec (TRESIBA FLEXTOUCH) 100 UNIT/ML pen 45-55 units subcutaneous daily.  Resume as your diet improves.  Start with 1/3 of your home dose and titrate up to your normal home dose as your diet improves and blood sugars remain normal. 45 mL 0     insulin pen needle (B-D U/F) 31G X 5 MM miscellaneous USE TO INJECT LANTUS TWICE DAILY AND NOVOLOG THREE TIMES DAILY AS DIRECTED 500 each 0     ketoconazole (NIZORAL) 2 % external shampoo APPLY TOPICALLY DAILY AS NEEDED FOR ITCHING OR IRRITATION. SEE MD AFTER 6 WEEKS 120 mL 3     lisinopril (ZESTRIL) 40 MG tablet Take 1 tablet (40 mg) by mouth daily 100 tablet 5     MAGNESIUM GLYCINATE PLUS PO Take 400 mg by mouth 2 times daily       metFORMIN (GLUCOPHAGE XR) 500 MG 24 hr tablet TAKE 2 TABLETS BY MOUTH TWICE DAILY 360 tablet 0     Multiple Vitamins-Minerals (MULTIVITAMIN PO) Take 1 tablet by mouth daily        Omega-3 Fatty Acids (OMEGA-3 FISH OIL PO) Take 1 g by mouth 2 times daily (with meals)        omeprazole (PRILOSEC) 40 MG DR capsule Take 40 mg by mouth daily       OZEMPIC, 1 MG/DOSE, 4 MG/3ML SOPN INJECT 1 MG SUBCUTANEOUSLY AS DIRECTED EVERY 7 DAYS ON WEDNESDAYS 9 mL 0     primidone (MYSOLINE) 50 MG  tablet 2 times daily       tadalafil (CIALIS) 5 MG tablet TAKE 1 TABLET BY MOUTH EVERY DAY AS NEEDED one hour before intercourse 30 tablet 1         ALLERGIES:  Allergies   Allergen Reactions     Hmg-Coa-R Inhibitors Swelling     Other reaction(s): Other (see comments)  SIMCOR caused edema in extremities       Bactrim [Sulfamethoxazole W/Trimethoprim] Nausea and Vomiting     Sulfa Drugs Nausea     Niacin Swelling     SIMCOR caused edema in extremities     Simvastatin Swelling     SIMCOR caused edema in extremities       PAST MEDICAL HISTORY:    Past Medical History:   Diagnosis Date     Ascending aorta dilatation (H)      Cerebral infarction (H)     2010     Gastroesophageal reflux disease with esophagitis      H/O blood clots 2022     Hyperlipidemia LDL goal <70      Hypertension      Numbness and tingling      Obesity      GILA (obstructive sleep apnea) 10/22/2018    CPAP intolerant     PVD (peripheral vascular disease) (H)     s/p left partial toe amputation     Renal stone      Tremor      Type 2 diabetes mellitus with diabetic polyneuropathy, with long-term current use of insulin (H)        PAST SURGICAL HISTORY:    Past Surgical History:   Procedure Laterality Date     AMPUTATE FOOT Left 8/18/2016    Procedure: AMPUTATE FOOT;  Surgeon: Jack Younger DPM;  [facility]     AMPUTATE TOE(S) Right 2/1/2016    Procedure: AMPUTATE TOE(S);  Surgeon: Rachelle Manriquez DPM, Pod;  [facility]     AMPUTATE TOE(S) Right 8/2/2019    Procedure: Right fourth toe partial amputation for treatment of osteomyelitis.;  Surgeon: Jack Younger DPM;  [facility]     AMPUTATE TOE(S) Left 11/8/2019    Procedure: Left second toe amputation at the metatarsophalangeal joint.;  Surgeon: Rachelle Manriquez DPM, Podiatry/Foot and Ankle Surgery;  [facility]     AMPUTATE TOE(S) Right 6/5/2022    Procedure: AMPUTATION OF RIGHT GREAT TOE;  Surgeon: Wili Quiles DPM;  [facility]     AMPUTATE TOE(S) Right  7/28/2022    Procedure: Right foot partial first metatarsal resection;  Surgeon: Tiny Soto DPM;  [facility]     ANGIOGRAM       BIOPSY BONE FOOT Right 7/24/2022    Procedure: Bone biopsy, right first metatarsal.;  Surgeon: Valentino Molina DPM;  [facility]     COLONOSCOPY       COMBINED CYSTOSCOPY, RETROGRADES, URETEROSCOPY, INSERT STENT Left 8/21/2016    Procedure: COMBINED CYSTOSCOPY, RETROGRADES, URETEROSCOPY, INSERT STENT;  Surgeon: Artemio Valenzuela MD;  [facility]     COMBINED CYSTOSCOPY, RETROGRADES, URETEROSCOPY, LASER HOLMIUM LITHOTRIPSY URETER(S), INSERT STENT Left 10/3/2016    Procedure: COMBINED CYSTOSCOPY, RETROGRADES, URETEROSCOPY, LASER HOLMIUM LITHOTRIPSY URETER(S), INSERT STENT;  Surgeon: Artemio Valenzuela MD;  [facility]     EXTRACORPOREAL SHOCK WAVE LITHOTRIPSY (ESWL) Left 9/8/2016    Procedure: EXTRACORPOREAL SHOCK WAVE LITHOTRIPSY (ESWL);  Surgeon: Artemio Valenzuela MD;  [facility]     INCISION AND DRAINAGE FOOT, COMBINED Right 7/24/2022    Procedure: Incision and drainage, right foot ;  Surgeon: Valentino Molina DPM;  [facility]     OSTEOTOMY FOOT Right 11/30/2022    Procedure: 1. Right second metatarsal floating osteotomy  2. Right second digit proximal phalanx arthroplasty 3. Right percutaneous flexor tenotomy;  Surgeon: Tiny Soto DPM;  [facility]     RECESSION GASTROCNEMIUS Right 2/1/2016    Procedure: RECESSION GASTROCNEMIUS;  Surgeon: Rachelle Manriquez DPM, Pod;  [facility]       FAMILY HISTORY:    Family History   Problem Relation Age of Onset     Arthritis Mother         SLE     Connective Tissue Disorder Mother         lupus     Diabetes Mother      Cerebrovascular Disease Mother      Cancer Mother      Diabetes Father      Coronary Artery Disease Father      Chronic Obstructive Pulmonary Disease Father      Diabetes Sister      Diabetes Maternal Grandfather        SOCIAL HISTORY:    Social History      Socioeconomic History     Marital status:      Spouse name: Sydney     Number of children: 2     Years of education: None     Highest education level: Master's degree (e.g., MA, MS, Arleth, MEd, MSW, ELIANA)   Occupational History     Occupation: marketing     Employer: [employer]     Comment: [employer]   Tobacco Use     Smoking status: Never     Smokeless tobacco: Never   Vaping Use     Vaping Use: Never used   Substance and Sexual Activity     Alcohol use: Not Currently     Comment: rare---red wine 3x per month     Drug use: Never     Sexual activity: Not Currently     Partners: Female   Other Topics Concern     Parent/sibling w/ CABG, MI or angioplasty before 65F 55M? No     Social Determinants of Health     Financial Resource Strain: Low Risk      Difficulty of Paying Living Expenses: Not very hard   Food Insecurity: No Food Insecurity     Worried About Running Out of Food in the Last Year: Never true     Ran Out of Food in the Last Year: Never true   Transportation Needs: No Transportation Needs     Lack of Transportation (Medical): No     Lack of Transportation (Non-Medical): No   Physical Activity: Sufficiently Active     Days of Exercise per Week: 5 days     Minutes of Exercise per Session: 50 min   Stress: No Stress Concern Present     Feeling of Stress : Not at all   Social Connections: Moderately Integrated     Frequency of Communication with Friends and Family: Twice a week     Frequency of Social Gatherings with Friends and Family: Never     Attends Scientology Services: More than 4 times per year     Active Member of Clubs or Organizations: Yes     Marital Status:    Housing Stability: Low Risk      Unable to Pay for Housing in the Last Year: No     Number of Places Lived in the Last Year: 2     Unstable Housing in the Last Year: No         CC  REFERRING PROVIDER:  Singh Theodore MD  [address] [place] [id number]

## 2023-01-11 NOTE — LETTER
"1/11/2023    Johann Serna DO  [address]  [facility] [address]    RE: Crispin Rojas       Dear Colleague,     I had the pleasure of seeing Crispin Rojas in the [facility].    Clinic visit note dictated. Dictation reference number - [id number]      Today's clinic visit entailed:  Review of the result(s) of each unique test - ECG, labs  The following tests were independently interpreted by me as noted in my documentation: Echocardiogram images  Ordering of each unique test  Prescription drug management        Encounter Diagnoses   Name Primary?     Benign essential hypertension      Hyperlipidemia LDL goal <70      Type 2 diabetes mellitus with peripheral neuropathy (H)      BMI 35.0-35.9,adult      Mild ascending aorta dilatation (H) Yes     History of stroke          Orders Placed This Encounter   Procedures     CT Chest Angio w/o & w Contrast     Follow-Up with Cardiology         Vitals: BP (!) 146/72   Pulse 63   Ht 1.816 m (5' 11.5\")   Wt 106.6 kg (235 lb)   SpO2 96%   BMI 32.32 kg/m    Wt Readings from Last 5 Encounters:   01/11/23 106.6 kg (235 lb)   01/09/23 106.6 kg (235 lb)   12/21/22 105.2 kg (232 lb)   12/07/22 106.1 kg (234 lb)   11/30/22 106.1 kg (234 lb)           CURRENT MEDICATIONS:  Current Outpatient Medications   Medication Sig Dispense Refill     acetaminophen (TYLENOL) 325 MG tablet Take 2 tablets (650 mg) by mouth every 6 hours as needed for mild pain or other (and adjunct with moderate or severe pain or per patient request)       amLODIPine (NORVASC) 5 MG tablet Take 1 tablet (5 mg) by mouth 2 times daily Take one tablet twice daily  SEE MD THIS QUARTER 200 tablet 5     apixaban ANTICOAGULANT (ELIQUIS) 5 MG tablet Take 1 tablet (5 mg) by mouth 2 times daily 60 tablet      atorvastatin (LIPITOR) 40 MG tablet Take 1 tablet (40 mg) by mouth daily Take one tablet daily SEE PROVIDER FOR NEXT REFILL 100 tablet 5     blood glucose (NO BRAND SPECIFIED) lancets standard " Use to test blood sugar 3 times daily or as directed. 300 each 3     calcium carbonate (OS-NARA) 500 MG tablet Take 1 tablet by mouth 2 times daily       cephALEXin (KEFLEX) 500 MG capsule TAKE 1 CAPSULE BY MOUTH THREE TIMES DAILY X 3 MONTHS       Cholecalciferol (VITAMIN D3) 25 MCG (1000 UT) CAPS Take 1,000 Units by mouth daily        Co-Enzyme Q10 100 MG CAPS Take 100 mg by mouth daily        collagenase (SANTYL) 250 UNIT/GM external ointment Apply topically daily 30 g 1     Continuous Blood Gluc Sensor (FREESTYLE LUCERO 14 DAY SENSOR) MISC USE ONE EVERY 14 DAYS 6 each 3     ELIQUIS ANTICOAGULANT 5 MG tablet Take 1 tablet (5 mg) by mouth 2 times daily 200 tablet 5     gabapentin (NEURONTIN) 100 MG capsule Take 400 mg by mouth 3 times daily       hydrochlorothiazide (MICROZIDE) 12.5 MG capsule Take 1 capsule (12.5 mg) by mouth every morning 100 capsule 5     Insulin Aspart FlexPen 100 UNIT/ML SOPN USE DAILY WITH MEALS, USING UP TO 70 UNITS PER DAY 30 mL 0     insulin degludec (TRESIBA FLEXTOUCH) 100 UNIT/ML pen 45-55 units subcutaneous daily.  Resume as your diet improves.  Start with 1/3 of your home dose and titrate up to your normal home dose as your diet improves and blood sugars remain normal. 45 mL 0     insulin pen needle (B-D U/F) 31G X 5 MM miscellaneous USE TO INJECT LANTUS TWICE DAILY AND NOVOLOG THREE TIMES DAILY AS DIRECTED 500 each 0     ketoconazole (NIZORAL) 2 % external shampoo APPLY TOPICALLY DAILY AS NEEDED FOR ITCHING OR IRRITATION. SEE MD AFTER 6 WEEKS 120 mL 3     lisinopril (ZESTRIL) 40 MG tablet Take 1 tablet (40 mg) by mouth daily 100 tablet 5     MAGNESIUM GLYCINATE PLUS PO Take 400 mg by mouth 2 times daily       metFORMIN (GLUCOPHAGE XR) 500 MG 24 hr tablet TAKE 2 TABLETS BY MOUTH TWICE DAILY 360 tablet 0     Multiple Vitamins-Minerals (MULTIVITAMIN PO) Take 1 tablet by mouth daily        Omega-3 Fatty Acids (OMEGA-3 FISH OIL PO) Take 1 g by mouth 2 times daily (with meals)         omeprazole (PRILOSEC) 40 MG DR capsule Take 40 mg by mouth daily       OZEMPIC, 1 MG/DOSE, 4 MG/3ML SOPN INJECT 1 MG SUBCUTANEOUSLY AS DIRECTED EVERY 7 DAYS ON WEDNESDAYS 9 mL 0     primidone (MYSOLINE) 50 MG tablet 2 times daily       tadalafil (CIALIS) 5 MG tablet TAKE 1 TABLET BY MOUTH EVERY DAY AS NEEDED one hour before intercourse 30 tablet 1         ALLERGIES:  Allergies   Allergen Reactions     Hmg-Coa-R Inhibitors Swelling     Other reaction(s): Other (see comments)  SIMCOR caused edema in extremities       Bactrim [Sulfamethoxazole W/Trimethoprim] Nausea and Vomiting     Sulfa Drugs Nausea     Niacin Swelling     SIMCOR caused edema in extremities     Simvastatin Swelling     SIMCOR caused edema in extremities       PAST MEDICAL HISTORY:    Past Medical History:   Diagnosis Date     Ascending aorta dilatation (H)      Cerebral infarction (H)     2010     Gastroesophageal reflux disease with esophagitis      H/O blood clots 2022     Hyperlipidemia LDL goal <70      Hypertension      Numbness and tingling      Obesity      GILA (obstructive sleep apnea) 10/22/2018    CPAP intolerant     PVD (peripheral vascular disease) (H)     s/p left partial toe amputation     Renal stone      Tremor      Type 2 diabetes mellitus with diabetic polyneuropathy, with long-term current use of insulin (H)        PAST SURGICAL HISTORY:    Past Surgical History:   Procedure Laterality Date     AMPUTATE FOOT Left 8/18/2016    Procedure: AMPUTATE FOOT;  Surgeon: Jack Younger DPM;  [facility]     AMPUTATE TOE(S) Right 2/1/2016    Procedure: AMPUTATE TOE(S);  Surgeon: Rachelle Manriquez DPM, Pod;  [facility]     AMPUTATE TOE(S) Right 8/2/2019    Procedure: Right fourth toe partial amputation for treatment of osteomyelitis.;  Surgeon: Jack Younger DPM;  [facility]     AMPUTATE TOE(S) Left 11/8/2019    Procedure: Left second toe amputation at the metatarsophalangeal joint.;  Surgeon: Rachelle Manriquez DPM,  Podiatry/Foot and Ankle Surgery;  [facility]     AMPUTATE TOE(S) Right 6/5/2022    Procedure: AMPUTATION OF RIGHT GREAT TOE;  Surgeon: Wili Quiles DPM;  [facility]     AMPUTATE TOE(S) Right 7/28/2022    Procedure: Right foot partial first metatarsal resection;  Surgeon: Tiny Soto DPM;  [facility]: [facility]     ANGIOGRAM       BIOPSY BONE FOOT Right 7/24/2022    Procedure: Bone biopsy, right first metatarsal.;  Surgeon: Valentino Molina DPM;  [facility]     COLONOSCOPY       COMBINED CYSTOSCOPY, RETROGRADES, URETEROSCOPY, INSERT STENT Left 8/21/2016    Procedure: COMBINED CYSTOSCOPY, RETROGRADES, URETEROSCOPY, INSERT STENT;  Surgeon: Artemio Valenzuela MD;  [facility]     COMBINED CYSTOSCOPY, RETROGRADES, URETEROSCOPY, LASER HOLMIUM LITHOTRIPSY URETER(S), INSERT STENT Left 10/3/2016    Procedure: COMBINED CYSTOSCOPY, RETROGRADES, URETEROSCOPY, LASER HOLMIUM LITHOTRIPSY URETER(S), INSERT STENT;  Surgeon: Artemio Valenzuela MD;  [facility]     EXTRACORPOREAL SHOCK WAVE LITHOTRIPSY (ESWL) Left 9/8/2016    Procedure: EXTRACORPOREAL SHOCK WAVE LITHOTRIPSY (ESWL);  Surgeon: Artemio Valenzuela MD;  [facility]     INCISION AND DRAINAGE FOOT, COMBINED Right 7/24/2022    Procedure: Incision and drainage, right foot ;  Surgeon: Valentino Molina DPM;  [facility]     OSTEOTOMY FOOT Right 11/30/2022    Procedure: 1. Right second metatarsal floating osteotomy  2. Right second digit proximal phalanx arthroplasty 3. Right percutaneous flexor tenotomy;  Surgeon: Tiny Soto DPM;  [facility]     RECESSION GASTROCNEMIUS Right 2/1/2016    Procedure: RECESSION GASTROCNEMIUS;  Surgeon: Rachelle Manriquez DPM, Pod;  [facility]       FAMILY HISTORY:    Family History   Problem Relation Age of Onset     Arthritis Mother         SLE     Connective Tissue Disorder Mother         lupus     Diabetes Mother      Cerebrovascular Disease Mother      Cancer Mother       Diabetes Father      Coronary Artery Disease Father      Chronic Obstructive Pulmonary Disease Father      Diabetes Sister      Diabetes Maternal Grandfather        SOCIAL HISTORY:    Social History     Socioeconomic History     Marital status:      Spouse name: Sydney     Number of children: 2     Years of education: None     Highest education level: Master's degree (e.g., MA, MS, Arleth, MEd, MSW, ELIANA)   Occupational History     Occupation: marketing     Employer: [employer]     Comment: [employer]   Tobacco Use     Smoking status: Never     Smokeless tobacco: Never   Vaping Use     Vaping Use: Never used   Substance and Sexual Activity     Alcohol use: Not Currently     Comment: rare---red wine 3x per month     Drug use: Never     Sexual activity: Not Currently     Partners: Female   Other Topics Concern     Parent/sibling w/ CABG, MI or angioplasty before 65F 55M? No     Social Determinants of Health     Financial Resource Strain: Low Risk      Difficulty of Paying Living Expenses: Not very hard   Food Insecurity: No Food Insecurity     Worried About Running Out of Food in the Last Year: Never true     Ran Out of Food in the Last Year: Never true   Transportation Needs: No Transportation Needs     Lack of Transportation (Medical): No     Lack of Transportation (Non-Medical): No   Physical Activity: Sufficiently Active     Days of Exercise per Week: 5 days     Minutes of Exercise per Session: 50 min   Stress: No Stress Concern Present     Feeling of Stress : Not at all   Social Connections: Moderately Integrated     Frequency of Communication with Friends and Family: Twice a week     Frequency of Social Gatherings with Friends and Family: Never     Attends Yazidism Services: More than 4 times per year     Active Member of Clubs or Organizations: Yes     Marital Status:    Housing Stability: Low Risk      Unable to Pay for Housing in the Last Year: No     Number of Places Lived in the Last Year: 2      Unstable Housing in the Last Year: No         CC  REFERRING PROVIDER:  Singh Theodore MD  [address] [place] [id number]                  Service Date: 01/11/2023    PRIMARY CARE PROVIDER:  Johann Serna DO    REASON FOR VISIT:  Followup of hypertension, hyperlipidemia, ascending aorta dilatation.     HISTORY OF PRESENT ILLNESS:    It was my pleasure to follow up with Judie, a delightful, 62-year-old  male, not accompanied today, lives with his wife, owns a , also works on a golf course and a restaurant.     He is known to have hypertension, suffered a stroke in 2010 (treated medically with no residual sequelae), type 2 diabetes mellitus of over 2 decades (on insulin, well controlled, complicated by neuropathy, status post left partial toe amputation), hyperlipidemia with goal LDL less than 70, obstructive sleep apnea (CPAP intolerant), BMI 32, right bundle branch block, mild aortic root and ascending aorta dilatation that has been stable, never tobacco user.     The following were addressed today:    1.  Hypertension, suboptimally controlled.   BP today is 146/72 (personally rechecked).  At home, it has been in the 150s systolic over the last few weeks in the context of him injuring his foot when he climbed up a stepladder, underwent podiatric surgery, has been less active and also ran out of his lisinopril and hydrochlorothiazide, which he did not take today.     2.  Hyperlipidemia with goal LDL less than 70.    On atorvastatin 40 mg daily.  Total cholesterol 107, HDL 42, LDL 45, triglycerides 99.     3.  History of recent DVT in 2022.   He has been started on Eliquis by Dr. Serna.  He is also on aspirin 81 mg.     4.  Ascending aorta and aortic root dilatation.    This has been under surveillance.  On his echocardiogram last year, his EF was 55%-60%, aortic root measured 4.1 cm, ascending aorta 4.2 cm, and aortic valve was trileaflet without regurgitation or stenosis.   Right ventricular systolic function is normal.  No family history of aortopathy.     DIAGNOSTIC DATA:    I personally reviewed the results of labs and ECG, which shows sinus rhythm with right bundle branch block and left axis.   I personally reviewed images and independently interpreted his transthoracic echocardiogram as documented above.      DIAGNOSES:  1.  Aortic root and ascending aorta dilatation.  Per updated ACC/AHA aorta guidelines, he may be reclassified with a confirmatory test to normal dimensions, but will need a CT angiogram for it.   2.  Hypertension.  Not well controlled.  We will reinstitute his medications, follow up and see if they need to be up titrated.   3.  Hyperlipidemia with goal LDL less than 70.  LDL is 45.  Continue atorvastatin 40 mg daily.   4.  Antiplatelet and anticoagulant management.  To decrease bleeding risk, I will stop aspirin since he is supposedly on long-term Eliquis.    PLAN:   1.  Medications:  - Stop aspirin 81 mg as he is on Eliquis.   - Stop potassium supplements as serum potassium is normal, and he is no longer taking p.r.n. furosemide for leg swelling.   - Renewed prescriptions for antihypertensive agents and atorvastatin.     2.  Orders placed:  - CT angiogram.   - Follow up with me to review results.    3.  Patient education and counseling.  - Talked about trying CPAP again.  He has done this many times and is simply intolerant.  He has also lost weight over the last few years and feels that his sleep apnea may be better.  -  Hopefully, once his foot has healed, he will go back to exercising regularly, which is important for his disease management and overall cardiovascular risk.     Established patient.  High complexity medical decision making.    cc:  Johann Serna, [facility]  [id number] [place] [address] Emeli Theodore MD        D: 01/11/2023   T: 01/11/2023   MT: [email or web address]    Name:     CEDRIC LAW  MRN:       [id number]        Account:      [id number]   :      1960           Service Date: 2023       Document: [id number]      Thank you for allowing me to participate in the care of your patient.      Sincerely,     Singh Theodore MD     [facility]  cc:   Singh Theodore MD  [address] [place] [id number]

## 2023-01-11 NOTE — PROGRESS NOTES
[facility]  CLINIC NOTE    CHIEF COMPLAINT:  right foot    PATIENT HISTORY:  Crispin Rojas is a 62 year old male who follows up for right foot. Had procedure on 11/30 to offload submet 2 ulcer. Is back to work and has been traveling a fair amount. At last appt, plantar ulcer was much smaller and close to closure. Had been dry. There was only minimal drainage to the dorsal incision site at that time. -States following that appt, on 12/28, he started having a new site drain on the right foot. States a large piece of skin was peeled off. Has been draining a lot since then. Denies pain. Denies N/F/V/C/D. Overall feels well. Plans to go to [employer] this weekend and is hoping to golf.     Review of Systems:  A 10 point review of systems was performed and is positive for that noted above in the patient history.  All other areas are negative.     PAST MEDICAL HISTORY:   Past Medical History:   Diagnosis Date     Ascending aorta dilatation (H)      Cerebral infarction (H)     2010     Gastroesophageal reflux disease with esophagitis      H/O blood clots 2022     Hyperlipidemia LDL goal <70      Hypertension      Numbness and tingling      Obesity      GILA (obstructive sleep apnea) 10/22/2018    CPAP intolerant     PVD (peripheral vascular disease) (H)     s/p left partial toe amputation     Renal stone      Tremor      Type 2 diabetes mellitus with diabetic polyneuropathy, with long-term current use of insulin (H)         PAST SURGICAL HISTORY:   Past Surgical History:   Procedure Laterality Date     AMPUTATE FOOT Left 8/18/2016    Procedure: AMPUTATE FOOT;  Surgeon: Jack Younger DPM;  Location: [facility]     AMPUTATE TOE(S) Right 2/1/2016    Procedure: AMPUTATE TOE(S);  Surgeon: Rachelle Manriquez DPM, Pod;  Location: [facility]     AMPUTATE TOE(S) Right 8/2/2019    Procedure: Right fourth toe partial amputation for treatment of osteomyelitis.;  Surgeon: Jack Younger DPM;  Location:  [facility]     AMPUTATE TOE(S) Left 11/8/2019    Procedure: Left second toe amputation at the metatarsophalangeal joint.;  Surgeon: Rachelle Manriquez DPM, Podiatry/Foot and Ankle Surgery;  Location: [facility]     AMPUTATE TOE(S) Right 6/5/2022    Procedure: AMPUTATION OF RIGHT GREAT TOE;  Surgeon: Wili Quiles DPM;  Location: [facility]     AMPUTATE TOE(S) Right 7/28/2022    Procedure: Right foot partial first metatarsal resection;  Surgeon: Tiny Soot DPM;  Location: [facility]     ANGIOGRAM       BIOPSY BONE FOOT Right 7/24/2022    Procedure: Bone biopsy, right first metatarsal.;  Surgeon: Valentino Molina DPM;  Location: [facility]     COLONOSCOPY       COMBINED CYSTOSCOPY, RETROGRADES, URETEROSCOPY, INSERT STENT Left 8/21/2016    Procedure: COMBINED CYSTOSCOPY, RETROGRADES, URETEROSCOPY, INSERT STENT;  Surgeon: Artemio Valenzuela MD;  Location: [facility]     COMBINED CYSTOSCOPY, RETROGRADES, URETEROSCOPY, LASER HOLMIUM LITHOTRIPSY URETER(S), INSERT STENT Left 10/3/2016    Procedure: COMBINED CYSTOSCOPY, RETROGRADES, URETEROSCOPY, LASER HOLMIUM LITHOTRIPSY URETER(S), INSERT STENT;  Surgeon: Artemio Valenzuela MD;  Location: [facility]     EXTRACORPOREAL SHOCK WAVE LITHOTRIPSY (ESWL) Left 9/8/2016    Procedure: EXTRACORPOREAL SHOCK WAVE LITHOTRIPSY (ESWL);  Surgeon: Artemio Valenzuela MD;  Location: [facility]     INCISION AND DRAINAGE FOOT, COMBINED Right 7/24/2022    Procedure: Incision and drainage, right foot ;  Surgeon: Valentino Molina DPM;  Location: [facility]     OSTEOTOMY FOOT Right 11/30/2022    Procedure: 1. Right second metatarsal floating osteotomy  2. Right second digit proximal phalanx arthroplasty 3. Right percutaneous flexor tenotomy;  Surgeon: Tiny Soto DPM;  Location: [facility]     RECESSION GASTROCNEMIUS Right 2/1/2016    Procedure: RECESSION GASTROCNEMIUS;  Surgeon: Rachelle Manriquez DPM, Pod;  Location: [facility]        MEDICATIONS:  Reviewed in Epic.     ALLERGIES:    Allergies   Allergen  Reactions     Hmg-Coa-R Inhibitors Swelling     Other reaction(s): Other (see comments)  SIMCOR caused edema in extremities       Bactrim [Sulfamethoxazole W/Trimethoprim] Nausea and Vomiting     Sulfa Drugs Nausea     Niacin Swelling     SIMCOR caused edema in extremities     Simvastatin Swelling     SIMCOR caused edema in extremities        SOCIAL HISTORY:   Social History     Socioeconomic History     Marital status:      Spouse name: Sydney     Number of children: 2     Years of education: Not on file     Highest education level: Master's degree (e.g., MA, MS, [place], MEd, MSW, ELIANA)   Occupational History     Occupation: marketing     Employer: [employer]     Comment: [employer]   Tobacco Use     Smoking status: Never     Smokeless tobacco: Never   Vaping Use     Vaping Use: Never used   Substance and Sexual Activity     Alcohol use: Not Currently     Comment: rare---red wine 3x per month     Drug use: Never     Sexual activity: Not Currently     Partners: Female   Other Topics Concern     Parent/sibling w/ CABG, MI or angioplasty before 65F 55M? No   Social History Narrative     Not on file     Social Determinants of Health     Financial Resource Strain: Low Risk      Difficulty of Paying Living Expenses: Not very hard   Food Insecurity: No Food Insecurity     Worried About Running Out of Food in the Last Year: Never true     Ran Out of Food in the Last Year: Never true   Transportation Needs: No Transportation Needs     Lack of Transportation (Medical): No     Lack of Transportation (Non-Medical): No   Physical Activity: Sufficiently Active     Days of Exercise per Week: 5 days     Minutes of Exercise per Session: 50 min   Stress: No Stress Concern Present     Feeling of Stress : Not at all   Social Connections: Moderately Integrated     Frequency of Communication with Friends and Family: Twice a week     Frequency of Social Gatherings with Friends and Family: Never     Attends Mandaen Services: More  than 4 times per year     Active Member of Clubs or Organizations: Yes     Attends Club or Organization Meetings: Not on file     Marital Status:    Intimate Partner Violence: Not on file   Housing Stability: Low Risk      Unable to Pay for Housing in the Last Year: No     Number of Places Lived in the Last Year: 2     Unstable Housing in the Last Year: No        FAMILY HISTORY:   Family History   Problem Relation Age of Onset     Arthritis Mother         SLE     Connective Tissue Disorder Mother         lupus     Diabetes Mother      Cerebrovascular Disease Mother      Cancer Mother      Diabetes Father      Coronary Artery Disease Father      Chronic Obstructive Pulmonary Disease Father      Diabetes Sister      Diabetes Maternal Grandfather         EXAM:Vitals: There were no vitals taken for this visit.  BMI= There is no height or weight on file to calculate BMI.      General appearance: Patient is alert and fully cooperative with history & exam.  No sign of distress is noted during the visit.      Respiratory: Breathing is regular & unlabored while sitting.      HEENT: Hearing is intact to spoken word.  Speech is clear.  No gross evidence of visual impairment that would impact ambulation.      Dermatologic:  New ulcer submet 3. Significantly larger in size and draining green fluid. Appears to be adjacent to previous site, which now is healed. No probe to bone. Does probe to subcutaneous tissue. No cellulitis.   No further drainage to second incision site, well healed.     Left foot submet 3 callus/preuclerative lesion, debrided to level of dermis. No open lesions or cellulitis.      Vascular: Dorsalis pedis and posterior tibial pulses are intact & regular bilaterally.  CFT and skin temperature is normal to both lower extremities.       Neurologic: Lower extremity sensation is diminished, bilateral foot, to light touch.  No evidence of neurological-based weakness or contracture in the lower extremities.        Musculoskeletal: Patient is ambulatory without an assistive device or brace.  S/p right first ray resection. Right 2nd digit --> now more rectus.   S/p left hallux and second digit amputations     Psychiatric: Affect is pleasant & appropriate.          Imaging:     I personally reviewed the images and agree with the reports as stated.    IMPRESSION: Interval osseous resorption and callus formation adjacent  to the distal second metatarsal osteotomy. Dorsal displacement of the  second metatarsal head. Soft tissue swelling of the second and third  toes in the region of the distal first ray. Differential  considerations include posttraumatic/neuropathic changes versus  osteomyelitis. Resection of the base of the second toe proximal  phalanx. Partial resection of the first ray at the level of the  proximal metatarsal diaphysis. Partial resection of the fourth toe at  the level of the head of the proximal phalanx.    PROCEDURE:   Verbal consent was obtained for debridement. A 15 blade was used to excise the nonivable tissue to the ulcer, down to and including subcutaneous tissue. No noted purulence afterwards. Ulcer measures 3cm x .3 x .4 cm. No probe to bone. Well tolerated. Site was cleansed with alcohol.       ASSESSMENT:  1. Right foot submet three ulcer --> new since last appt     2. Left foot submet two ulcer  --> preulcerative --> remain stable.      MEDICAL DECISION MAKING:   -Patient seen today. At last appt, had been doing well and almost all ulcers were healed. He went on vacation and missed last appt due to snow.   -Today has new, significantly larger ulcer to submet 3. This is likely to transfer pressure from previous surgery, unusual anatomy and significant ambulation on right foot.   -He plans to go on vacation this weekend. Discussed risks of this, which include worsening infection while he's abroad, requiring hospitalization. Other risks are worsening ulceration, requiring hospitalization when he  returns.   -He states he wants to go on his trip. Cipro prescribed due to green drainage. Discussed that he must be off of his foot at all times. New CAM boot dispensed.   -Discussed need to offload third and fourth metatarsals with osteotomies. Also discussed option for transmetatarsal amputation, he declines this. This is being scheduled somewhat urgently, on Thursday next week. Did discuss that if site appears worse next week, will need to remove metatarsal head to insure there's no bone infection.   -He agrees with these plans.     -Risks for worsening infection, need for amputation and limb loss were discussed in detail.     Tiny Soto DPM   [facility]      Greater than 30 minutes were spent today, reviewing previous hospital records, counseling and educating the patient on the ongoing treatment plan and coordinating care.

## 2023-01-12 ENCOUNTER — OFFICE VISIT (OUTPATIENT)
Dept: FAMILY MEDICINE | Facility: CLINIC | Age: 63
End: 2023-01-12
Payer: COMMERCIAL

## 2023-01-12 ENCOUNTER — TELEPHONE (OUTPATIENT)
Dept: PODIATRY | Facility: CLINIC | Age: 63
End: 2023-01-12

## 2023-01-12 ENCOUNTER — MYC MEDICAL ADVICE (OUTPATIENT)
Dept: WOUND CARE | Facility: CLINIC | Age: 63
End: 2023-01-12

## 2023-01-12 VITALS
DIASTOLIC BLOOD PRESSURE: 88 MMHG | HEART RATE: 65 BPM | WEIGHT: 235 LBS | TEMPERATURE: 97.8 F | OXYGEN SATURATION: 96 % | BODY MASS INDEX: 32.9 KG/M2 | SYSTOLIC BLOOD PRESSURE: 142 MMHG | RESPIRATION RATE: 18 BRPM | HEIGHT: 71 IN

## 2023-01-12 DIAGNOSIS — E11.622 DIABETIC ULCER OF LOWER EXTREMITY (H): ICD-10-CM

## 2023-01-12 DIAGNOSIS — L97.909 DIABETIC ULCER OF LOWER EXTREMITY (H): ICD-10-CM

## 2023-01-12 DIAGNOSIS — Z01.818 PREOP GENERAL PHYSICAL EXAM: Primary | ICD-10-CM

## 2023-01-12 PROCEDURE — 99214 OFFICE O/P EST MOD 30 MIN: CPT | Performed by: NURSE PRACTITIONER

## 2023-01-12 ASSESSMENT — PATIENT HEALTH QUESTIONNAIRE - PHQ9
SUM OF ALL RESPONSES TO PHQ QUESTIONS 1-9: 2
SUM OF ALL RESPONSES TO PHQ QUESTIONS 1-9: 2
10. IF YOU CHECKED OFF ANY PROBLEMS, HOW DIFFICULT HAVE THESE PROBLEMS MADE IT FOR YOU TO DO YOUR WORK, TAKE CARE OF THINGS AT HOME, OR GET ALONG WITH OTHER PEOPLE: NOT DIFFICULT AT ALL

## 2023-01-12 ASSESSMENT — ANXIETY QUESTIONNAIRES
GAD7 TOTAL SCORE: 0
7. FEELING AFRAID AS IF SOMETHING AWFUL MIGHT HAPPEN: NOT AT ALL
GAD7 TOTAL SCORE: 0
3. WORRYING TOO MUCH ABOUT DIFFERENT THINGS: NOT AT ALL
GAD7 TOTAL SCORE: 0
2. NOT BEING ABLE TO STOP OR CONTROL WORRYING: NOT AT ALL
7. FEELING AFRAID AS IF SOMETHING AWFUL MIGHT HAPPEN: NOT AT ALL
5. BEING SO RESTLESS THAT IT IS HARD TO SIT STILL: NOT AT ALL
8. IF YOU CHECKED OFF ANY PROBLEMS, HOW DIFFICULT HAVE THESE MADE IT FOR YOU TO DO YOUR WORK, TAKE CARE OF THINGS AT HOME, OR GET ALONG WITH OTHER PEOPLE?: NOT DIFFICULT AT ALL
6. BECOMING EASILY ANNOYED OR IRRITABLE: NOT AT ALL
4. TROUBLE RELAXING: NOT AT ALL
IF YOU CHECKED OFF ANY PROBLEMS ON THIS QUESTIONNAIRE, HOW DIFFICULT HAVE THESE PROBLEMS MADE IT FOR YOU TO DO YOUR WORK, TAKE CARE OF THINGS AT HOME, OR GET ALONG WITH OTHER PEOPLE: NOT DIFFICULT AT ALL
1. FEELING NERVOUS, ANXIOUS, OR ON EDGE: NOT AT ALL

## 2023-01-12 NOTE — PATIENT INSTRUCTIONS
Medication Instructions:   - apixaban (Eliquis), edoxaban (Savaysa), rivaroxaban (Xarelto): Bleeding risk is low for this procedure AND CrCl (>=) 50 mL/min. HOLD 1 day before surgery.     - ACE/ARB: May be continued on the day of surgery. Lisinopril   - Calcium Channel Blockers: May be continued on the day of surgery. Amlodipine   - Diuretics: HOLD on the day of surgery. Hydrochlorothiazide  -HOLD short acting insulin  -HOLD Metformin  -80% decrease in dose of Tresiba - 44 units morning of surgery.      For informational purposes only. Not to replace the advice of your health care provider. Copyright   2003,  [facility]. All rights reserved. Clinically reviewed by Jada Andrade MD. [facility] [id number] - REV .  Preparing for Your Surgery  Getting started  A nurse will call you to review your health history and instructions. They will give you an arrival time based on your scheduled surgery time. Please be ready to share:  Your doctor's clinic name and phone number  Your medical, surgical, and anesthesia history  A list of allergies and sensitivities  A list of medicines, including herbal treatments and over-the-counter drugs  Whether the patient has a legal guardian (ask how to send us the papers in advance)  Please tell us if you're pregnant--or if there's any chance you might be pregnant. Some surgeries may injure a fetus (unborn baby), so they require a pregnancy test. Surgeries that are safe for a fetus don't always need a test, and you can choose whether to have one.   If you have a child who's having surgery, please ask for a copy of Preparing for Your Child's Surgery.    Preparing for surgery  Within 10 to 30 days of surgery: Have a pre-op exam (sometimes called an H&P, or History and Physical). This can be done at a clinic or pre-operative center.  If you're having a , you may not need this exam. Talk to your care team.  At your pre-op exam, talk to your care team about all  medicines you take. If you need to stop any medicines before surgery, ask when to start taking them again.  We do this for your safety. Many medicines can make you bleed too much during surgery. Some change how well surgery (anesthesia) drugs work.  Call your insurance company to let them know you're having surgery. (If you don't have insurance, call [phone].)  Call your clinic if there's any change in your health. This includes signs of a cold or flu (sore throat, runny nose, cough, rash, fever). It also includes a scrape or scratch near the surgery site.  If you have questions on the day of surgery, call your hospital or surgery center.  Eating and drinking guidelines  For your safety: Unless your surgeon tells you otherwise, follow the guidelines below.  Eat and drink as usual until 8 hours before you arrive for surgery. After that, no food or milk.  Drink clear liquids until 2 hours before you arrive. These are liquids you can see through, like water, Gatorade, and Propel Water. They also include plain black coffee and tea (no cream or milk), candy, and breath mints. You can spit out gum when you arrive.  If you drink alcohol: Stop drinking it the night before surgery.  If your care team tells you to take medicine on the morning of surgery, it's okay to take it with a sip of water.  Preventing infection  Shower or bathe the night before and morning of your surgery. Follow the instructions your clinic gave you. (If no instructions, use regular soap.)  Don't shave or clip hair near your surgery site. We'll remove the hair if needed.  Don't smoke or vape the morning of surgery. You may chew nicotine gum up to 2 hours before surgery. A nicotine patch is okay.  Note: Some surgeries require you to completely quit smoking and nicotine. Check with your surgeon.  Your care team will make every effort to keep you safe from infection. We will:  Clean our hands often with soap and water (or an alcohol-based hand  rub).  Clean the skin at your surgery site with a special soap that kills germs.  Give you a special gown to keep you warm. (Cold raises the risk of infection.)  Wear special hair covers, masks, gowns and gloves during surgery.  Give antibiotic medicine, if prescribed. Not all surgeries need antibiotics.  What to bring on the day of surgery  Photo ID and insurance card  Copy of your health care directive, if you have one  Glasses and hearing aids (bring cases)  You can't wear contacts during surgery  Inhaler and eye drops, if you use them (tell us about these when you arrive)  CPAP machine or breathing device, if you use them  A few personal items, if spending the night  If you have . . .  A pacemaker, ICD (cardiac defibrillator) or other implant: Bring the ID card.  An implanted stimulator: Bring the remote control.  A legal guardian: Bring a copy of the certified (court-stamped) guardianship papers.  Please remove any jewelry, including body piercings. Leave jewelry and other valuables at home.  If you're going home the day of surgery  You must have a responsible adult drive you home. They should stay with you overnight as well.  If you don't have someone to stay with you, and you aren't safe to go home alone, we may keep you overnight. Insurance often won't pay for this.  After surgery  If it's hard to control your pain or you need more pain medicine, please call your surgeon's office.  Questions?   If you have any questions for your care team, list them here: _________________________________________________________________________________________________________________________________________________________________________ ____________________________________ ____________________________________ ____________________________________

## 2023-01-12 NOTE — PROGRESS NOTES
[address] [address]  Phone: [phone]  Primary Provider: Johann Serna  Pre-op Performing Provider: ANN FRAUSTO      PREOPERATIVE EVALUATION:    Today's date: 1/12/2023    Crispin Rojas is a 62 year old male who presents for a preoperative evaluation.    Surgical Information:  Surgery/Procedure: Right foot metatarsal osteotomies, hammer toe repair  Surgery Location: [facility]  Surgeon: Dr. Soto  Surgery Date: 1/19/2023  Time of Surgery: 7:30 AM  Where patient plans to recover: At home with family possibly or alone. Depending on what day he does it.   Fax number for surgical facility: Note does not need to be faxed, will be available electronically in Epic.    Type of Anesthesia Anticipated: General    Assessment & Plan     The proposed surgical procedure is considered INTERMEDIATE risk.    Crispin was seen today for pre-op exam.    Diagnoses and all orders for this visit:    Preop general physical exam  Diabetic ulcer of lower extremity (H)       Risks and Recommendations:  The patient has the following additional risks and recommendations for perioperative complications:  Diabetes:  - Patient is on insulin therapy; diabetic NPO guidelines provided and discussed.  Obstructive Sleep Apnea:   Previously CPAP intolerant, reports 80 lb weight loss.   Anemia/Bleeding/Clotting:    - Anemia and does not require treatment prior to surgery. Monitor hemoglobin postoperatively  Infection:   -Currently on antibiotics, prescribed by podiatry    Medication Instructions:   - apixaban (Eliquis), edoxaban (Savaysa), rivaroxaban (Xarelto): Bleeding risk is low for this procedure AND CrCl (>=) 50 mL/min. HOLD 1 day before surgery.     - ACE/ARB: May be continued on the day of surgery.    - Calcium Channel Blockers: May be continued on the day of surgery.   - Diuretics: HOLD on the day of surgery.  -HOLD short acting insulin  -HOLD  Metformin   - Long acting insulin: Take 80% of the usual evening or morning dose before surgery.  44 units of Tresiba morning of surgery.     RECOMMENDATION:  APPROVAL GIVEN to proceed with proposed procedure, without further diagnostic evaluation.        Subjective     HPI related to upcoming procedure:     Has been following with podiatry for diabetic foot ulcers and was seen by podiatry on 1/11/23 with recommendations to proceed with osteotomies to offload third and fourth metatarsals vs. Transmetatarsal amputation (declined)    Preop Questions 1/12/2023   1. Have you ever had a heart attack or stroke? YES - CVA in 2010, treated medically   2. Have you ever had surgery on your heart or blood vessels, such as a stent placement, a coronary artery bypass, or surgery on an artery in your head, neck, heart, or legs? No   3. Do you have chest pain with activity? No   4. Do you have a history of  heart failure? No   5. Do you currently have a cold, bronchitis or symptoms of other infection? No   6. Do you have a cough, shortness of breath, or wheezing? No   7. Do you or anyone in your family have previous history of blood clots? YES - DVT in 2022, currently on Eliquis   8. Do you or does anyone in your family have a serious bleeding problem such as prolonged bleeding following surgeries or cuts? No   9. Have you ever had problems with anemia or been told to take iron pills? No   10. Have you had any abnormal blood loss such as black, tarry or bloody stools? No   11. Have you ever had a blood transfusion? No   12. Are you willing to have a blood transfusion if it is medically needed before, during, or after your surgery? Yes   13. Have you or any of your relatives ever had problems with anesthesia? YES - mother with allergy to anesthesia   14. Do you have sleep apnea, excessive snoring or daytime drowsiness? GILA, CPAP intolerant  80 lb weight loss since diagnosis   15. Do you have any artifical heart valves or other  implanted medical devices like a pacemaker, defibrillator, or continuous glucose monitor? No   16. Do you have artificial joints? No   17. Are you allergic to latex? No       Health Care Directive:  Patient does not have a Health Care Directive or Living Will: Discussed advance care planning with patient; however, patient declined at this time.    Preoperative Review of :   reviewed - controlled substances reflected in medication list.      Status of Chronic Conditions:  See problem list for active medical problems.  Problems all longstanding and stable, except as noted/documented.  See ROS for pertinent symptoms related to these conditions.      Review of Systems  CONSTITUTIONAL: NEGATIVE for fever, chills, change in weight  INTEGUMENTARY/SKIN: NEGATIVE for worrisome rashes, moles or lesions  EYES: NEGATIVE for vision changes or irritation  ENT/MOUTH: NEGATIVE for ear, mouth and throat problems  RESP: NEGATIVE for significant cough or SOB  CV: NEGATIVE for chest pain, palpitations or peripheral edema  GI: NEGATIVE for nausea, abdominal pain, heartburn, or change in bowel habits  : NEGATIVE for frequency, dysuria, or hematuria  MUSCULOSKELETAL: NEGATIVE for significant arthralgias or myalgia  NEURO: NEGATIVE for weakness, dizziness or paresthesias  ENDOCRINE: NEGATIVE for temperature intolerance, skin/hair changes  HEME: NEGATIVE for bleeding problems  PSYCHIATRIC: NEGATIVE for changes in mood or affect    Patient Active Problem List    Diagnosis Date Noted     Acute osteomyelitis of right foot (H) 07/22/2022     Priority: Medium     Abnormal CT of liver 07/08/2022     Priority: Medium     Acute deep vein thrombosis (DVT) of tibial vein of right lower extremity (H) 07/06/2022     Priority: Medium     Osteomyelitis of great toe of right foot (H) 06/03/2022     Priority: Medium     Cervical pain 01/13/2022     Priority: Medium     Bilateral thoracic back pain 01/13/2022     Priority: Medium     Scoliosis of  thoracic spine, unspecified scoliosis type 01/03/2022     Priority: Medium     PVD (peripheral vascular disease) (H) 12/31/2021     Priority: Medium     s/p left partial toe amputation       Other sequelae following unspecified cerebrovascular disease 12/02/2021     Priority: Medium     Benign neoplasm of transverse colon 11/19/2021     Priority: Medium     Hemorrhoids 11/17/2021     Priority: Medium     Polyp of colon 11/17/2021     Priority: Medium     Duodenitis 10/15/2021     Priority: Medium     Diabetic ulcer of lower extremity (H) 11/07/2019     Priority: Medium     GILA (obstructive sleep apnea) 10/22/2018     Priority: Medium     HST 10/18/2018, AHI : 34.1       Right bundle branch block 06/23/2018     Priority: Medium     On ECG 6/23/2018, advised to f/u with PCP       Hyperlipidemia LDL goal <70 06/07/2018     Priority: Medium     Essential hypertension 06/07/2018     Priority: Medium     Type 2 diabetes mellitus with peripheral neuropathy (H) 11/27/2017     Priority: Medium     Chronic left shoulder pain 01/30/2017     Priority: Medium     Calculus of kidney 08/30/2016     Priority: Medium      Past Medical History:   Diagnosis Date     Ascending aorta dilatation (H)      Cerebral infarction (H)     2010     Gastroesophageal reflux disease with esophagitis      H/O blood clots 2022     Hyperlipidemia LDL goal <70      Hypertension      Numbness and tingling      Obesity      GILA (obstructive sleep apnea) 10/22/2018    CPAP intolerant     PVD (peripheral vascular disease) (H)     s/p left partial toe amputation     Renal stone      Tremor      Type 2 diabetes mellitus with diabetic polyneuropathy, with long-term current use of insulin (H)      Past Surgical History:   Procedure Laterality Date     AMPUTATE FOOT Left 8/18/2016    Procedure: AMPUTATE FOOT;  Surgeon: Jack Younger DPM;  Location: [facility]     AMPUTATE TOE(S) Right 2/1/2016    Procedure: AMPUTATE TOE(S);  Surgeon: Rachelle Manriquez DPM, Pod;   Location: [facility]     AMPUTATE TOE(S) Right 8/2/2019    Procedure: Right fourth toe partial amputation for treatment of osteomyelitis.;  Surgeon: Jack Younger DPM;  Location: [facility]     AMPUTATE TOE(S) Left 11/8/2019    Procedure: Left second toe amputation at the metatarsophalangeal joint.;  Surgeon: Rachelle Manriquez DPM, Podiatry/Foot and Ankle Surgery;  Location: [facility]     AMPUTATE TOE(S) Right 6/5/2022    Procedure: AMPUTATION OF RIGHT GREAT TOE;  Surgeon: Wili Quiles DPM;  Location: [facility]     AMPUTATE TOE(S) Right 7/28/2022    Procedure: Right foot partial first metatarsal resection;  Surgeon: Tiny Soto DPM;  Location: [facility]     ANGIOGRAM       BIOPSY BONE FOOT Right 7/24/2022    Procedure: Bone biopsy, right first metatarsal.;  Surgeon: Valentino Molina DPM;  Location: [facility]     COLONOSCOPY       COMBINED CYSTOSCOPY, RETROGRADES, URETEROSCOPY, INSERT STENT Left 8/21/2016    Procedure: COMBINED CYSTOSCOPY, RETROGRADES, URETEROSCOPY, INSERT STENT;  Surgeon: Artemio Valenzuela MD;  Location: [facility]     COMBINED CYSTOSCOPY, RETROGRADES, URETEROSCOPY, LASER HOLMIUM LITHOTRIPSY URETER(S), INSERT STENT Left 10/3/2016    Procedure: COMBINED CYSTOSCOPY, RETROGRADES, URETEROSCOPY, LASER HOLMIUM LITHOTRIPSY URETER(S), INSERT STENT;  Surgeon: Artemio Valenzuela MD;  Location: [facility]     EXTRACORPOREAL SHOCK WAVE LITHOTRIPSY (ESWL) Left 9/8/2016    Procedure: EXTRACORPOREAL SHOCK WAVE LITHOTRIPSY (ESWL);  Surgeon: Artemio Valenzuela MD;  Location: [facility]     INCISION AND DRAINAGE FOOT, COMBINED Right 7/24/2022    Procedure: Incision and drainage, right foot ;  Surgeon: Valentino Molina DPM;  Location: [facility]     OSTEOTOMY FOOT Right 11/30/2022    Procedure: 1. Right second metatarsal floating osteotomy  2. Right second digit proximal phalanx arthroplasty 3. Right percutaneous flexor tenotomy;  Surgeon: Tiny Soto DPM;  Location: [facility]     RECESSION GASTROCNEMIUS Right  2/1/2016    Procedure: RECESSION GASTROCNEMIUS;  Surgeon: Rachelle Manriquez, DPM, Pod;  Location: [facility]     Current Outpatient Medications   Medication Sig Dispense Refill     amLODIPine (NORVASC) 5 MG tablet Take 1 tablet (5 mg) by mouth 2 times daily Take one tablet twice daily  SEE MD THIS QUARTER 200 tablet 5     apixaban ANTICOAGULANT (ELIQUIS) 5 MG tablet Take 1 tablet (5 mg) by mouth 2 times daily 60 tablet      atorvastatin (LIPITOR) 40 MG tablet Take 1 tablet (40 mg) by mouth daily Take one tablet daily SEE PROVIDER FOR NEXT REFILL 100 tablet 5     blood glucose (NO BRAND SPECIFIED) lancets standard Use to test blood sugar 3 times daily or as directed. 300 each 3     calcium carbonate (OS-NARA) 500 MG tablet Take 1 tablet by mouth 2 times daily       cephALEXin (KEFLEX) 500 MG capsule TAKE 1 CAPSULE BY MOUTH THREE TIMES DAILY X 3 MONTHS       Cholecalciferol (VITAMIN D3) 25 MCG (1000 UT) CAPS Take 1,000 Units by mouth daily        ciprofloxacin (CIPRO) 500 MG tablet Take 1 tablet (500 mg) by mouth 2 times daily for 8 days 16 tablet 0     Co-Enzyme Q10 100 MG CAPS Take 100 mg by mouth daily        collagenase (SANTYL) 250 UNIT/GM external ointment Apply topically daily 30 g 1     Continuous Blood Gluc Sensor (FREESTYLE LUCERO 14 DAY SENSOR) [facility] [place] USE ONE EVERY 14 DAYS 6 each 3     ELIQUIS ANTICOAGULANT 5 MG tablet Take 1 tablet (5 mg) by mouth 2 times daily 200 tablet 5     gabapentin (NEURONTIN) 100 MG capsule Take 400 mg by mouth 3 times daily       hydrochlorothiazide (MICROZIDE) 12.5 MG capsule Take 1 capsule (12.5 mg) by mouth every morning 100 capsule 5     Insulin Aspart FlexPen 100 UNIT/ML SOPN USE DAILY WITH MEALS, USING UP TO 70 UNITS PER DAY 30 mL 0     insulin degludec (TRESIBA FLEXTOUCH) 100 UNIT/ML pen 45-55 units subcutaneous daily.  Resume as your diet improves.  Start with 1/3 of your home dose and titrate up to your normal home dose as your diet improves and blood sugars remain normal. 45 mL 0      ketoconazole (NIZORAL) 2 % external shampoo APPLY TOPICALLY DAILY AS NEEDED FOR ITCHING OR IRRITATION. SEE MD AFTER 6 WEEKS 120 mL 3     lisinopril (ZESTRIL) 40 MG tablet Take 1 tablet (40 mg) by mouth daily 100 tablet 5     MAGNESIUM GLYCINATE PLUS PO Take 400 mg by mouth 2 times daily       metFORMIN (GLUCOPHAGE XR) 500 MG 24 hr tablet TAKE 2 TABLETS BY MOUTH TWICE DAILY 360 tablet 0     Multiple Vitamins-Minerals (MULTIVITAMIN PO) Take 1 tablet by mouth daily        Omega-3 Fatty Acids (OMEGA-3 FISH OIL PO) Take 1 g by mouth 2 times daily (with meals)        omeprazole (PRILOSEC) 40 MG DR capsule Take 40 mg by mouth daily       OZEMPIC, 1 MG/DOSE, 4 MG/3ML SOPN INJECT 1 MG SUBCUTANEOUSLY AS DIRECTED EVERY 7 DAYS ON WEDNESDAYS 9 mL 0     primidone (MYSOLINE) 50 MG tablet 2 times daily       tadalafil (CIALIS) 5 MG tablet TAKE 1 TABLET BY MOUTH EVERY DAY AS NEEDED one hour before intercourse 30 tablet 1     acetaminophen (TYLENOL) 325 MG tablet Take 2 tablets (650 mg) by mouth every 6 hours as needed for mild pain or other (and adjunct with moderate or severe pain or per patient request) (Patient not taking: Reported on 1/12/2023)       insulin pen needle (B-D U/F) 31G X 5 MM miscellaneous USE TO INJECT LANTUS TWICE DAILY AND NOVOLOG THREE TIMES DAILY AS DIRECTED 500 each 0       Allergies   Allergen Reactions     Hmg-Coa-R Inhibitors Swelling     Other reaction(s): Other (see comments)  SIMCOR caused edema in extremities       Bactrim [Sulfamethoxazole W/Trimethoprim] Nausea and Vomiting     Sulfa Drugs Nausea     Niacin Swelling     SIMCOR caused edema in extremities     Simvastatin Swelling     SIMCOR caused edema in extremities        Social History     Tobacco Use     Smoking status: Never     Smokeless tobacco: Never   Substance Use Topics     Alcohol use: Not Currently     Comment: rare---red wine 3x per month     Family History   Problem Relation Age of Onset     Arthritis Mother         SLE      "Connective Tissue Disorder Mother         lupus     Diabetes Mother      Cerebrovascular Disease Mother      Cancer Mother      Diabetes Father      Coronary Artery Disease Father      Chronic Obstructive Pulmonary Disease Father      Diabetes Sister      Diabetes Maternal Grandfather      History   Drug Use Unknown         Objective     BP (!) 142/88   Pulse 65   Temp 97.8  F (36.6  C)   Resp 18   Ht 1.803 m (5' 11\")   Wt 106.6 kg (235 lb)   SpO2 96%   BMI 32.78 kg/m      Physical Exam       GENERAL APPEARANCE: healthy, alert and no distress     EYES: EOMI,  PERRL     HENT: ear canals and TM's normal and nose and mouth without ulcers or lesions     NECK: no adenopathy, no asymmetry, masses, or scars and thyroid normal to palpation     RESP: lungs clear to auscultation - no rales, rhonchi or wheezes     CV: regular rates and rhythm, normal S1 S2, no S3 or S4 and no murmur, click or rub     ABDOMEN:  soft, nontender, no HSM or masses and bowel sounds normal     MS: extremities normal- no gross deformities noted, no evidence of inflammation in joints, FROM in all extremities.     SKIN: no suspicious lesions or rashes     NEURO: Normal strength and tone, sensory exam grossly normal, mentation intact and speech normal     PSYCH: mentation appears normal. and affect normal/bright     LYMPHATICS: No cervical adenopathy    Recent Labs   Lab Test 01/09/23  0843 11/29/22  1112 06/03/22  2204 06/03/22  1203   HGB 11.2* 11.7*   < > 11.7*    147*   < > 143*    139   < > 140   POTASSIUM 4.6 4.4   < > 4.3   CR 1.07 0.85   < > 0.97   A1C  --  5.6  --  6.6*    < > = values in this interval not displayed.        Diagnostics:  Recent Results (from the past 168 hour(s))   Lipid Profile    Collection Time: 01/09/23  8:43 AM   Result Value Ref Range    Cholesterol 107 <200 mg/dL    Triglycerides 99 <150 mg/dL    Direct Measure HDL 42 >=40 mg/dL    LDL Cholesterol Calculated 45 <=100 mg/dL    Non HDL Cholesterol 65 " <130 mg/dL   Comprehensive metabolic panel    Collection Time: 01/09/23  8:43 AM   Result Value Ref Range    Sodium 144 136 - 145 mmol/L    Potassium 4.6 3.4 - 5.3 mmol/L    Chloride 106 98 - 107 mmol/L    Carbon Dioxide (CO2) 24 22 - 29 mmol/L    Anion Gap 14 7 - 15 mmol/L    Urea Nitrogen 23.2 (H) 8.0 - 23.0 mg/dL    Creatinine 1.07 0.67 - 1.17 mg/dL    Calcium 9.4 8.8 - 10.2 mg/dL    Glucose 95 70 - 99 mg/dL    Alkaline Phosphatase 68 40 - 129 U/L    AST 33 10 - 50 U/L    ALT 21 10 - 50 U/L    Protein Total 7.4 6.4 - 8.3 g/dL    Albumin 4.0 3.5 - 5.2 g/dL    Bilirubin Total 0.2 <=1.2 mg/dL    GFR Estimate 79 >60 mL/min/1.73m2   Albumin Random Urine Quantitative with Creat Ratio    Collection Time: 01/09/23  8:43 AM   Result Value Ref Range    Albumin Urine mg/L <12.0 mg/L    Albumin Urine mg/g Cr      Creatinine Urine mg/dL 127.0 mg/dL   CBC with platelets and differential    Collection Time: 01/09/23  8:43 AM   Result Value Ref Range    WBC Count 6.4 4.0 - 11.0 10e3/uL    RBC Count 4.00 (L) 4.40 - 5.90 10e6/uL    Hemoglobin 11.2 (L) 13.3 - 17.7 g/dL    Hematocrit 35.7 (L) 40.0 - 53.0 %    MCV 89 78 - 100 fL    MCH 28.0 26.5 - 33.0 pg    MCHC 31.4 (L) 31.5 - 36.5 g/dL    RDW 13.8 10.0 - 15.0 %    Platelet Count 188 150 - 450 10e3/uL    % Neutrophils 57 %    % Lymphocytes 29 %    % Monocytes 8 %    % Eosinophils 5 %    % Basophils 1 %    Absolute Neutrophils 3.7 1.6 - 8.3 10e3/uL    Absolute Lymphocytes 1.9 0.8 - 5.3 10e3/uL    Absolute Monocytes 0.5 0.0 - 1.3 10e3/uL    Absolute Eosinophils 0.3 0.0 - 0.7 10e3/uL    Absolute Basophils 0.0 0.0 - 0.2 10e3/uL      No EKG this visit, completed in the last 90 days.    Revised Cardiac Risk Index (RCRI):  The patient has the following serious cardiovascular risks for perioperative complications:   - Cerebrovascular Disease (TIA or CVA) = 1 point   - Diabetes Mellitus (on Insulin) = 1 point     RCRI Interpretation: 2 points: Class III (moderate risk - 6.6% complication  rate)     Estimated Functional Capacity: Performs 4 METS exercise without symptoms (e.g., light housework, stairs, 4 mph walk, 7 mph bike, slow step dance)           Signed Electronically by: SARAH Gaines CNP

## 2023-01-18 ENCOUNTER — ANESTHESIA EVENT (OUTPATIENT)
Dept: SURGERY | Facility: CLINIC | Age: 63
End: 2023-01-18
Payer: COMMERCIAL

## 2023-01-18 ASSESSMENT — LIFESTYLE VARIABLES: TOBACCO_USE: 0

## 2023-01-18 NOTE — TELEPHONE ENCOUNTER
Called patient and left a voicemail that Dr Soto is on vacation Monday so we cannot move his surgery date. Asked that he call us back with the other questions or mychart reply.    Marissa Cooley MA    
Patient wanted to confirm what medications to stop prior to surgery. Asked patient to follow the instructions provided at his pre-op physical.  Patient verbalized understanding.      No other questions regarding surgery.     Closing encounter.       Gay Weber, Surgery Scheduler      
palpitations

## 2023-01-19 ENCOUNTER — APPOINTMENT (OUTPATIENT)
Dept: GENERAL RADIOLOGY | Facility: CLINIC | Age: 63
End: 2023-01-19
Attending: PODIATRIST
Payer: COMMERCIAL

## 2023-01-19 ENCOUNTER — HOSPITAL ENCOUNTER (OUTPATIENT)
Facility: CLINIC | Age: 63
Discharge: HOME | End: 2023-01-19
Attending: PODIATRIST | Admitting: PODIATRIST
Payer: COMMERCIAL

## 2023-01-19 ENCOUNTER — ANESTHESIA (OUTPATIENT)
Dept: SURGERY | Facility: CLINIC | Age: 63
End: 2023-01-19
Payer: COMMERCIAL

## 2023-01-19 VITALS
DIASTOLIC BLOOD PRESSURE: 73 MMHG | HEIGHT: 72 IN | BODY MASS INDEX: 31.87 KG/M2 | SYSTOLIC BLOOD PRESSURE: 136 MMHG | TEMPERATURE: 98 F | RESPIRATION RATE: 18 BRPM | WEIGHT: 235.3 LBS | HEART RATE: 55 BPM | OXYGEN SATURATION: 95 %

## 2023-01-19 DIAGNOSIS — E11.42 TYPE 2 DIABETES MELLITUS WITH PERIPHERAL NEUROPATHY (H): ICD-10-CM

## 2023-01-19 DIAGNOSIS — L97.512 RIGHT FOOT ULCER, WITH FAT LAYER EXPOSED (H): ICD-10-CM

## 2023-01-19 LAB
FASTING STATUS PATIENT QL REPORTED: YES
GLUCOSE BLDC GLUCOMTR-MCNC: 84 MG/DL (ref 70–99)
GLUCOSE SERPL-MCNC: 120 MG/DL (ref 70–99)
POTASSIUM SERPL-SCNC: 4 MMOL/L (ref 3.4–5.3)

## 2023-01-19 PROCEDURE — 250N000009 HC RX 250: Performed by: PODIATRIST

## 2023-01-19 PROCEDURE — 28122 PARTIAL REMOVAL OF FOOT BONE: CPT | Mod: 78 | Performed by: PODIATRIST

## 2023-01-19 PROCEDURE — 258N000003 HC RX IP 258 OP 636: Performed by: ANESTHESIOLOGY

## 2023-01-19 PROCEDURE — 87077 CULTURE AEROBIC IDENTIFY: CPT | Performed by: PODIATRIST

## 2023-01-19 PROCEDURE — C1713 ANCHOR/SCREW BN/BN,TIS/BN: HCPCS | Performed by: PODIATRIST

## 2023-01-19 PROCEDURE — 87075 CULTR BACTERIA EXCEPT BLOOD: CPT | Performed by: PODIATRIST

## 2023-01-19 PROCEDURE — 272N000001 HC OR GENERAL SUPPLY STERILE: Performed by: PODIATRIST

## 2023-01-19 PROCEDURE — 999N000063 XR FOOT PORT RIGHT 3 VIEWS: Mod: RT

## 2023-01-19 PROCEDURE — 82962 GLUCOSE BLOOD TEST: CPT

## 2023-01-19 PROCEDURE — 11042 DBRDMT SUBQ TIS 1ST 20SQCM/<: CPT | Mod: 78 | Performed by: PODIATRIST

## 2023-01-19 PROCEDURE — 250N000011 HC RX IP 250 OP 636: Performed by: PODIATRIST

## 2023-01-19 PROCEDURE — 88311 DECALCIFY TISSUE: CPT | Mod: 26 | Performed by: PATHOLOGY

## 2023-01-19 PROCEDURE — 710N000012 HC RECOVERY PHASE 2, PER MINUTE: Performed by: PODIATRIST

## 2023-01-19 PROCEDURE — 88305 TISSUE EXAM BY PATHOLOGIST: CPT | Mod: 26 | Performed by: PATHOLOGY

## 2023-01-19 PROCEDURE — 250N000009 HC RX 250: Performed by: ANESTHESIOLOGY

## 2023-01-19 PROCEDURE — 999N000141 HC STATISTIC PRE-PROCEDURE NURSING ASSESSMENT: Performed by: PODIATRIST

## 2023-01-19 PROCEDURE — 84132 ASSAY OF SERUM POTASSIUM: CPT | Performed by: ANESTHESIOLOGY

## 2023-01-19 PROCEDURE — 28308 INCISION OF METATARSAL: CPT | Mod: 78 | Performed by: PODIATRIST

## 2023-01-19 PROCEDURE — 710N000009 HC RECOVERY PHASE 1, LEVEL 1, PER MIN: Performed by: PODIATRIST

## 2023-01-19 PROCEDURE — 250N000011 HC RX IP 250 OP 636: Performed by: ANESTHESIOLOGY

## 2023-01-19 PROCEDURE — 370N000017 HC ANESTHESIA TECHNICAL FEE, PER MIN: Performed by: PODIATRIST

## 2023-01-19 PROCEDURE — 360N000077 HC SURGERY LEVEL 4, PER MIN: Performed by: PODIATRIST

## 2023-01-19 PROCEDURE — L4361 PNEUMA/VAC WALK BOOT PRE OTS: HCPCS

## 2023-01-19 PROCEDURE — 82947 ASSAY GLUCOSE BLOOD QUANT: CPT | Performed by: ANESTHESIOLOGY

## 2023-01-19 PROCEDURE — 88311 DECALCIFY TISSUE: CPT | Mod: TC | Performed by: PODIATRIST

## 2023-01-19 PROCEDURE — 36415 COLL VENOUS BLD VENIPUNCTURE: CPT | Performed by: ANESTHESIOLOGY

## 2023-01-19 RX ORDER — PROPOFOL 10 MG/ML
INJECTION, EMULSION INTRAVENOUS CONTINUOUS PRN
Status: DISCONTINUED | OUTPATIENT
Start: 2023-01-19 | End: 2023-01-19

## 2023-01-19 RX ORDER — GLYCOPYRROLATE 0.2 MG/ML
INJECTION, SOLUTION INTRAMUSCULAR; INTRAVENOUS PRN
Status: DISCONTINUED | OUTPATIENT
Start: 2023-01-19 | End: 2023-01-19

## 2023-01-19 RX ORDER — FENTANYL CITRATE 50 UG/ML
INJECTION, SOLUTION INTRAMUSCULAR; INTRAVENOUS PRN
Status: DISCONTINUED | OUTPATIENT
Start: 2023-01-19 | End: 2023-01-19

## 2023-01-19 RX ORDER — IBUPROFEN 600 MG/1
600 TABLET, FILM COATED ORAL
Status: DISCONTINUED | OUTPATIENT
Start: 2023-01-19 | End: 2023-01-19 | Stop reason: HOSPADM

## 2023-01-19 RX ORDER — SEMAGLUTIDE 1.34 MG/ML
INJECTION, SOLUTION SUBCUTANEOUS
Qty: 3 ML | Refills: 0 | Status: SHIPPED | OUTPATIENT
Start: 2023-01-19 | End: 2023-02-20

## 2023-01-19 RX ORDER — MAGNESIUM HYDROXIDE 1200 MG/15ML
LIQUID ORAL PRN
Status: DISCONTINUED | OUTPATIENT
Start: 2023-01-19 | End: 2023-01-19 | Stop reason: HOSPADM

## 2023-01-19 RX ORDER — CEFAZOLIN SODIUM/WATER 2 G/20 ML
2 SYRINGE (ML) INTRAVENOUS SEE ADMIN INSTRUCTIONS
Status: DISCONTINUED | OUTPATIENT
Start: 2023-01-19 | End: 2023-01-19 | Stop reason: HOSPADM

## 2023-01-19 RX ORDER — OXYCODONE HYDROCHLORIDE 5 MG/1
5 TABLET ORAL
Status: DISCONTINUED | OUTPATIENT
Start: 2023-01-19 | End: 2023-01-19 | Stop reason: HOSPADM

## 2023-01-19 RX ORDER — SODIUM CHLORIDE, SODIUM LACTATE, POTASSIUM CHLORIDE, CALCIUM CHLORIDE 600; 310; 30; 20 MG/100ML; MG/100ML; MG/100ML; MG/100ML
INJECTION, SOLUTION INTRAVENOUS CONTINUOUS
Status: DISCONTINUED | OUTPATIENT
Start: 2023-01-19 | End: 2023-01-19 | Stop reason: HOSPADM

## 2023-01-19 RX ORDER — HYDROMORPHONE HCL IN WATER/PF 6 MG/30 ML
0.4 PATIENT CONTROLLED ANALGESIA SYRINGE INTRAVENOUS EVERY 5 MIN PRN
Status: DISCONTINUED | OUTPATIENT
Start: 2023-01-19 | End: 2023-01-19 | Stop reason: HOSPADM

## 2023-01-19 RX ORDER — LIDOCAINE HYDROCHLORIDE 20 MG/ML
INJECTION, SOLUTION INFILTRATION; PERINEURAL PRN
Status: DISCONTINUED | OUTPATIENT
Start: 2023-01-19 | End: 2023-01-19

## 2023-01-19 RX ORDER — ONDANSETRON 4 MG/1
4 TABLET, ORALLY DISINTEGRATING ORAL EVERY 30 MIN PRN
Status: DISCONTINUED | OUTPATIENT
Start: 2023-01-19 | End: 2023-01-19 | Stop reason: HOSPADM

## 2023-01-19 RX ORDER — BUPIVACAINE HYDROCHLORIDE 5 MG/ML
INJECTION, SOLUTION EPIDURAL; INTRACAUDAL PRN
Status: DISCONTINUED | OUTPATIENT
Start: 2023-01-19 | End: 2023-01-19 | Stop reason: HOSPADM

## 2023-01-19 RX ORDER — PROPOFOL 10 MG/ML
INJECTION, EMULSION INTRAVENOUS PRN
Status: DISCONTINUED | OUTPATIENT
Start: 2023-01-19 | End: 2023-01-19

## 2023-01-19 RX ORDER — ONDANSETRON 2 MG/ML
4 INJECTION INTRAMUSCULAR; INTRAVENOUS EVERY 30 MIN PRN
Status: DISCONTINUED | OUTPATIENT
Start: 2023-01-19 | End: 2023-01-19 | Stop reason: HOSPADM

## 2023-01-19 RX ORDER — CIPROFLOXACIN 500 MG/1
500 TABLET, FILM COATED ORAL 2 TIMES DAILY
Qty: 16 TABLET | Refills: 0 | Status: SHIPPED | OUTPATIENT
Start: 2023-01-19 | End: 2023-01-26

## 2023-01-19 RX ORDER — CEFAZOLIN SODIUM/WATER 2 G/20 ML
2 SYRINGE (ML) INTRAVENOUS
Status: COMPLETED | OUTPATIENT
Start: 2023-01-19 | End: 2023-01-19

## 2023-01-19 RX ORDER — FENTANYL CITRATE 0.05 MG/ML
25 INJECTION, SOLUTION INTRAMUSCULAR; INTRAVENOUS EVERY 5 MIN PRN
Status: DISCONTINUED | OUTPATIENT
Start: 2023-01-19 | End: 2023-01-19 | Stop reason: HOSPADM

## 2023-01-19 RX ORDER — FENTANYL CITRATE 0.05 MG/ML
50 INJECTION, SOLUTION INTRAMUSCULAR; INTRAVENOUS EVERY 5 MIN PRN
Status: DISCONTINUED | OUTPATIENT
Start: 2023-01-19 | End: 2023-01-19 | Stop reason: HOSPADM

## 2023-01-19 RX ORDER — FENTANYL CITRATE 0.05 MG/ML
25 INJECTION, SOLUTION INTRAMUSCULAR; INTRAVENOUS
Status: DISCONTINUED | OUTPATIENT
Start: 2023-01-19 | End: 2023-01-19 | Stop reason: HOSPADM

## 2023-01-19 RX ORDER — HYDROMORPHONE HCL IN WATER/PF 6 MG/30 ML
0.2 PATIENT CONTROLLED ANALGESIA SYRINGE INTRAVENOUS EVERY 5 MIN PRN
Status: DISCONTINUED | OUTPATIENT
Start: 2023-01-19 | End: 2023-01-19 | Stop reason: HOSPADM

## 2023-01-19 RX ORDER — SODIUM CHLORIDE, SODIUM LACTATE, POTASSIUM CHLORIDE, CALCIUM CHLORIDE 600; 310; 30; 20 MG/100ML; MG/100ML; MG/100ML; MG/100ML
INJECTION, SOLUTION INTRAVENOUS CONTINUOUS PRN
Status: DISCONTINUED | OUTPATIENT
Start: 2023-01-19 | End: 2023-01-19

## 2023-01-19 RX ORDER — ONDANSETRON 2 MG/ML
INJECTION INTRAMUSCULAR; INTRAVENOUS PRN
Status: DISCONTINUED | OUTPATIENT
Start: 2023-01-19 | End: 2023-01-19

## 2023-01-19 RX ADMIN — ONDANSETRON 4 MG: 2 INJECTION INTRAMUSCULAR; INTRAVENOUS at 07:53

## 2023-01-19 RX ADMIN — SODIUM CHLORIDE, POTASSIUM CHLORIDE, SODIUM LACTATE AND CALCIUM CHLORIDE: 600; 310; 30; 20 INJECTION, SOLUTION INTRAVENOUS at 07:47

## 2023-01-19 RX ADMIN — PHENYLEPHRINE HYDROCHLORIDE 100 MCG: 10 INJECTION INTRAVENOUS at 08:40

## 2023-01-19 RX ADMIN — Medication 2 G: at 07:47

## 2023-01-19 RX ADMIN — GLYCOPYRROLATE 0.2 MG: 0.2 INJECTION, SOLUTION INTRAMUSCULAR; INTRAVENOUS at 08:01

## 2023-01-19 RX ADMIN — PROPOFOL 20 MG: 10 INJECTION, EMULSION INTRAVENOUS at 08:02

## 2023-01-19 RX ADMIN — PROPOFOL 75 MCG/KG/MIN: 10 INJECTION, EMULSION INTRAVENOUS at 07:51

## 2023-01-19 RX ADMIN — LIDOCAINE HYDROCHLORIDE 20 MG: 20 INJECTION, SOLUTION INFILTRATION; PERINEURAL at 07:53

## 2023-01-19 RX ADMIN — FENTANYL CITRATE 25 MCG: 50 INJECTION, SOLUTION INTRAMUSCULAR; INTRAVENOUS at 08:01

## 2023-01-19 RX ADMIN — FENTANYL CITRATE 25 MCG: 50 INJECTION, SOLUTION INTRAMUSCULAR; INTRAVENOUS at 07:53

## 2023-01-19 RX ADMIN — PROPOFOL 20 MG: 10 INJECTION, EMULSION INTRAVENOUS at 07:53

## 2023-01-19 ASSESSMENT — ACTIVITIES OF DAILY LIVING (ADL)
ADLS_ACUITY_SCORE: 35
ADLS_ACUITY_SCORE: 35

## 2023-01-19 ASSESSMENT — ENCOUNTER SYMPTOMS
ORTHOPNEA: 0
SEIZURES: 0
DYSRHYTHMIAS: 0

## 2023-01-19 NOTE — OP NOTE
PREOPERATIVE DIAGNOSES:     1.  Right foot submet 3 and submet 4 ulcer        POSTOPERATIVE DIAGNOSES:   1.   Right foot submet 3 and submet 4 ulcer           PROCEDURE:     1.  Right foot third metatarsal head excision  2.  Right foot fourth metatarsal floating osteotomy   3.  Ulcer excisional debridement down to and including tendon for site measuring 4 cm x 3 cm x 1 cm      ANESTHESIA:  MAC with local 20 ml of .5% marcaine plain        HEMOSTASIS:  Electrocautery.       ESTIMATED BLOOD LOSS:  10 mL.       SPECIMENS:  Soft tissue culture from third metatarsal site, third metatarsal head for pathology       MATERIALS:  None    Indications: Crispin Rojas  has an ulcer to the right foot. Patient was seen last week and ulcer was new and quite large. Likely developed due to increased activity to site, following other recent surgery. Discussion was had about the size of the ulcer and the risk of bone infection. He went out of town and is being seen in preop. The third metatarsal head is now exposed through the wound bed. Given this, plan will be to excise this bone to prevent worsening infection. A floating fourth metatarsal osteotomy will also be done as it's also contributing to the ulcer. This procedure has been discussed at length including risk of worsening infection or reulceration. He was told that he needs to be NWB following procedure.  Procedure was discussed with patient at length, including all risks and benefits. Patient agrees to planned procedure.     Procedure: Under mild sedation pt was brought into the OR & placed on the operating table in a supine position. A total of 20 cc of .5% marcaine were injected into the base of the fourth and third digits. The foot was scrubbed, prepped and draped in an aseptic manner. A well padded ankle tourniquet was inflated to 250 mmHg. It stayed inflated for a total of 32 minutes. The procedure started with the fourth digit as this was the clean site.  An incision  was made along the fourth ray and carried down to the metatarsal. The metatarsal was extraordinarily plantarflexed, making access difficult. The extensor tendon was transected. The fourth digit was cleared of soft tissue at the fourth metatarsal. The sagittal saw was then used to cut through the metatarsal, starting dorsally and moving plantarly. This was done and significant time was spent, displacing the metarsal head dorsally. A mcglamry elevator was used to assist in this.  All bleeders were ligated and cauterized as necessary. Next copious amounts of saline were used to flush the amputation site. Site was then closed with 3-0 vicryl and 3.0 prolene suture.    Next, the third metatarsal was addressed. The metatarsal head was visualized through the plantar ulcer. It was debrided and isolated from the site. The sagittal saw was then used to transect the metatarsal and excise it. The metatarsal head was slightly grey in color. The proximal bone appeared healthy. This was removed and sent for pathology.     Following this, significant time was spent debrided all necrotic tissue from the wound bed. A rongeur was used to excise necrotic subcutaneous, adipose and tendon tissue. The tourniquet was deflated for this portion so that vascularity could be better evaluated. Continued debridement was done of all bad tissue. There was no hayde purulence. Next copious amounts of saline were used to flush the amputation site.     Following this, 2-0 prolene was used to close as much of the plantar ulcer as possible. The defect was too large to close completely, but the site was partially closed and significantly smaller than prior to procedure.     Upon completion of suturing, a non-adhesive sterile dressing was then placed over the surgical site followed by 4 x 4s, kerlix, & an ACE wrap.     The pt tolerated the procedure & anesthesia well.  He was transferred to the recovery room with vital signs stable and vascular status  intact to the right foot.  Following a period of post-op monitoring the pt will return to his hospital bed with the following written and oral post-op instructions:    Keep dressing clean, dry and intact.  Do not remove dressing.  NWB to RLE   Elevate foot at rest.  Antibiotics will be continued until follow up appt.   Contact Dr. Soto if any post-op questions or arrise.

## 2023-01-19 NOTE — ANESTHESIA POSTPROCEDURE EVALUATION
Patient: Crispin Rojas    Procedure: Procedure(s):  Right foot third metatarsal head excision, ulcer debridement, matatarsal osteotomies       Anesthesia Type:  MAC    Note:  Disposition: Outpatient   Postop Pain Control: Uneventful            Sign Out: Well controlled pain   PONV: No   Neuro/Psych: Uneventful            Sign Out: Acceptable/Baseline neuro status   Airway/Respiratory: Uneventful            Sign Out: Acceptable/Baseline resp. status   CV/Hemodynamics: Uneventful            Sign Out: Acceptable CV status; No obvious hypovolemia; No obvious fluid overload   Other NRE: NONE   DID A NON-ROUTINE EVENT OCCUR? No           Last vitals:  Vitals Value Taken Time   /62 01/19/23 0945   Temp     Pulse 57 01/19/23 0948   Resp 9 01/19/23 0949   SpO2 93 % 01/19/23 0947   Vitals shown include unvalidated device data.    Electronically Signed By: Maria G Ruiz MD  January 19, 2023  12:29 PM

## 2023-01-19 NOTE — ANESTHESIA PREPROCEDURE EVALUATION
Anesthesia Pre-Procedure Evaluation    Patient: Crispin Rojas   MRN: [id number] : 1960        Procedure : Procedure(s):  Right foot metatarsal osteotomies, hammer toe repair          Past Medical History:   Diagnosis Date     Ascending aorta dilatation (H)      Cerebral infarction (H)          Gastroesophageal reflux disease with esophagitis      H/O blood clots      Hyperlipidemia LDL goal <70      Hypertension      Numbness and tingling      Obesity      GILA (obstructive sleep apnea) 10/22/2018    CPAP intolerant     PVD (peripheral vascular disease) (H)     s/p left partial toe amputation     Renal stone      Tremor      Type 2 diabetes mellitus with diabetic polyneuropathy, with long-term current use of insulin (H)       Past Surgical History:   Procedure Laterality Date     AMPUTATE FOOT Left 2016    Procedure: AMPUTATE FOOT;  Surgeon: Jack Younger DPM;  [facility]     AMPUTATE TOE(S) Right 2016    Procedure: AMPUTATE TOE(S);  Surgeon: Rachelle Manriquez DPM, Pod;  [facility]     AMPUTATE TOE(S) Right 2019    Procedure: Right fourth toe partial amputation for treatment of osteomyelitis.;  Surgeon: Jack Younger DPM;  [facility]     AMPUTATE TOE(S) Left 2019    Procedure: Left second toe amputation at the metatarsophalangeal joint.;  Surgeon: Rachelle Manriquez DPM, Podiatry/Foot and Ankle Surgery;  [facility]     AMPUTATE TOE(S) Right 2022    Procedure: AMPUTATION OF RIGHT GREAT TOE;  Surgeon: Wili Quiles DPM;  [facility]     AMPUTATE TOE(S) Right 2022    Procedure: Right foot partial first metatarsal resection;  Surgeon: Tiny Soto DPM;  [facility]     ANGIOGRAM       BIOPSY BONE FOOT Right 2022    Procedure: Bone biopsy, right first metatarsal.;  Surgeon: Valentino Molina DPM;  [facility]     COLONOSCOPY       COMBINED CYSTOSCOPY, RETROGRADES, URETEROSCOPY, INSERT STENT Left 2016     Procedure: COMBINED CYSTOSCOPY, RETROGRADES, URETEROSCOPY, INSERT STENT;  Surgeon: Artemio Valenzuela MD;  [facility]     COMBINED CYSTOSCOPY, RETROGRADES, URETEROSCOPY, LASER HOLMIUM LITHOTRIPSY URETER(S), INSERT STENT Left 10/3/2016    Procedure: COMBINED CYSTOSCOPY, RETROGRADES, URETEROSCOPY, LASER HOLMIUM LITHOTRIPSY URETER(S), INSERT STENT;  Surgeon: Artemio Valenzuela MD;  [facility]     EXTRACORPOREAL SHOCK WAVE LITHOTRIPSY (ESWL) Left 9/8/2016    Procedure: EXTRACORPOREAL SHOCK WAVE LITHOTRIPSY (ESWL);  Surgeon: Artemio Valenzuela MD;  [facility]     INCISION AND DRAINAGE FOOT, COMBINED Right 7/24/2022    Procedure: Incision and drainage, right foot ;  Surgeon: Valentino Molina DPM;  [facility]     OSTEOTOMY FOOT Right 11/30/2022    Procedure: 1. Right second metatarsal floating osteotomy  2. Right second digit proximal phalanx arthroplasty 3. Right percutaneous flexor tenotomy;  Surgeon: Tiny Soto DPM;  [facility]     RECESSION GASTROCNEMIUS Right 2/1/2016    Procedure: RECESSION GASTROCNEMIUS;  Surgeon: Rachelle Manriquez DPM, Pod;  [facility]      Allergies   Allergen Reactions     Hmg-Coa-R Inhibitors Swelling     Other reaction(s): Other (see comments)  SIMCOR caused edema in extremities       Bactrim [Sulfamethoxazole W/Trimethoprim] Nausea and Vomiting     Sulfa Drugs Nausea     Niacin Swelling     SIMCOR caused edema in extremities     Simvastatin Swelling     SIMCOR caused edema in extremities      Social History     Tobacco Use     Smoking status: Never     Smokeless tobacco: Never   Substance Use Topics     Alcohol use: Not Currently     Comment: rare---red wine 3x per month      Wt Readings from Last 1 Encounters:   01/12/23 106.6 kg (235 lb)        Anesthesia Evaluation   Pt has had prior anesthetic.     No history of anesthetic complications       ROS/MED HX  ENT/Pulmonary:     (+) sleep apnea, doesn't use CPAP,  (-) tobacco use    Neurologic:     (+) CVA (Deficits: name recognition. Balance remains intact.),  (-) no seizures   Cardiovascular:     (+) hypertension-Peripheral Vascular Disease---- (-) orthopnea/PND and arrhythmias   METS/Exercise Tolerance: 3 - Able to walk 1-2 blocks without stopping    Hematologic:     (+) History of blood clots (DVT),     Musculoskeletal:       GI/Hepatic:     (+) GERD, Asymptomatic on medication,  (-) liver disease   Renal/Genitourinary:    (-) renal disease   Endo:     (+) type II DM (Diabetic ulcer of lower extremity), Obesity,     Psychiatric/Substance Use:       Infectious Disease:       Malignancy:       Other:            Physical Exam    Airway        Mallampati: III   TM distance: > 3 FB   Neck ROM: full   Mouth opening: > 3 cm    Respiratory Devices and Support         Dental           Cardiovascular          Rhythm and rate: regular and normal     Pulmonary           breath sounds clear to auscultation           OUTSIDE LABS:  CBC:   Lab Results   Component Value Date    WBC 6.4 01/09/2023    WBC 5.6 11/29/2022    HGB 11.2 (L) 01/09/2023    HGB 11.7 (L) 11/29/2022    HCT 35.7 (L) 01/09/2023    HCT 36.7 (L) 11/29/2022     01/09/2023     (L) 11/29/2022     BMP:   Lab Results   Component Value Date     01/09/2023     11/29/2022    POTASSIUM 4.6 01/09/2023    POTASSIUM 4.4 11/29/2022    CHLORIDE 106 01/09/2023    CHLORIDE 103 11/29/2022    CO2 24 01/09/2023    CO2 24 11/29/2022    BUN 23.2 (H) 01/09/2023    BUN 27.1 (H) 11/29/2022    CR 1.07 01/09/2023    CR 0.85 11/29/2022    GLC 95 01/09/2023    GLC 90 11/30/2022     COAGS:   Lab Results   Component Value Date    PTT 39 (H) 07/28/2022    INR 1.08 11/07/2019     POC:   Lab Results   Component Value Date     (H) 05/10/2021     HEPATIC:   Lab Results   Component Value Date    ALBUMIN 4.0 01/09/2023    PROTTOTAL 7.4 01/09/2023    ALT 21 01/09/2023    AST 33 01/09/2023    ALKPHOS 68 01/09/2023    BILITOTAL 0.2 01/09/2023      OTHER:   Lab Results   Component Value Date    LACT 1.7 07/22/2022    A1C 5.6 11/29/2022    NARA 9.4 01/09/2023    MAG 2.1 08/02/2019    LIPASE 156 07/22/2022    TSH 1.18 07/23/2022    CRP <3.00 09/08/2022    SED 24 (H) 09/08/2022       Anesthesia Plan    ASA Status:  3   NPO Status:  NPO Appropriate    Anesthesia Type: MAC.     - Reason for MAC: straight local not clinically adequate   Induction: Intravenous, Propofol.   Maintenance: Balanced.        Consents    Anesthesia Plan(s) and associated risks, benefits, and realistic alternatives discussed. Questions answered and patient/representative(s) expressed understanding.     - Discussed: Risks, Benefits and Alternatives for the PROCEDURE were discussed     - Discussed with:  Patient         Postoperative Care    Pain management: Multi-modal analgesia, IV analgesics.   PONV prophylaxis: Ondansetron (or other 5HT-3), Background Propofol Infusion     Comments:                Maria G Ruiz MD

## 2023-01-19 NOTE — ANESTHESIA CARE TRANSFER NOTE
Patient: Crispin Rojas    Procedure: Procedure(s):  Right foot third metatarsal head excision, ulcer debridement, matatarsal osteotomies       Diagnosis: Right foot ulcer, with fat layer exposed (H) [L97.512]  Diagnosis Additional Information: No value filed.    Anesthesia Type:   MAC     Note:    Oropharynx: oropharynx clear of all foreign objects and spontaneously breathing  Level of Consciousness: drowsy  Oxygen Supplementation: face mask  Level of Supplemental Oxygen (L/min / FiO2): 10  Independent Airway: airway patency satisfactory and stable    Vital Signs Stable: post-procedure vital signs reviewed and stable  Report to RN Given: handoff report given  Patient transferred to: Phase II    Handoff Report: Identifed the Patient, Identified the Reponsible Provider, Reviewed the pertinent medical history, Discussed the surgical course, Reviewed Intra-OP anesthesia mangement and issues during anesthesia, Set expectations for post-procedure period and Allowed opportunity for questions and acknowledgement of understanding      Vitals:  Vitals Value Taken Time   /65    Temp     Pulse 60 01/19/23 0907   Resp 14 01/19/23 0907   SpO2 98 % 01/19/23 0907   Vitals shown include unvalidated device data.    Electronically Signed By: ASRAH Mitchell CRNA  January 19, 2023  9:08 AM

## 2023-01-19 NOTE — DISCHARGE INSTRUCTIONS
Resume Eliquis tomorrow morning (1/20/23).    Same Day Surgery Discharge Instructions for  Sedation and General Anesthesia     It's not unusual to feel dizzy, light-headed or faint for up to 24 hours after surgery or while taking pain medication.  If you have these symptoms: sit for a few minutes before standing and have someone assist you when you get up to walk or use the bathroom.    You should rest and relax for the next 24 hours. We recommend you make arrangements to have an adult stay with you for at least 24 hours after your discharge.  Avoid hazardous and strenuous activity.    DO NOT DRIVE any vehicle or operate mechanical equipment for 24 hours following the end of your surgery.  Even though you may feel normal, your reactions may be affected by the medication you have received.    Do not drink alcoholic beverages for 24 hours following surgery.     Slowly progress to your regular diet as you feel able. It's not unusual to feel nauseated and/or vomit after receiving anesthesia.  If you develop these symptoms, drink clear liquids (apple juice, ginger ale, broth, 7-up, etc. ) until you feel better.  If your nausea and vomiting persists for 24 hours, please notify your surgeon.      All narcotic pain medications, along with inactivity and anesthesia, can cause constipation. Drinking plenty of liquids and increasing fiber intake will help.    For any questions of a medical nature, call your surgeon.    Do not make important decisions for 24 hours.    If you had general anesthesia, you may have a sore throat for a couple of days related to the breathing tube used during surgery.  You may use Cepacol lozenges to help with this discomfort.  If it worsens or if you develop a fever, contact your surgeon.     If you feel your pain is not well managed with the pain medications prescribed by your surgeon, please contact your surgeon's office to let them know so they can address your concerns.     Reasons to contact  your surgeon:    Signs of possible infection: Check your incision daily for redness, swelling, warmth, red streaks or foul drainage.   Elevated temperature.  Pain not controlled with pain medication and/or rest.   Uncontrolled nausea or vomiting.  Any questions or concerns.        **If you have questions or concerns about your procedure, call   Dr. Soto at *

## 2023-01-19 NOTE — BRIEF OP NOTE
[facility]    Brief Operative Note    Pre-operative diagnosis: Right foot ulcer, with fat layer exposed (H) [L97.512]  Post-operative diagnosis Same as pre-operative diagnosis    Procedure: Right foot third metatarsal head excision, right foot fourth metatarsal osteotomy, ulcer excisional debridement down to and including tendon for site measuring 4 cm x 3 cm x 1 cm    Surgeon: Surgeon(s) and Role:     * Tiny Soto DPM - Primary  Anesthesia: MAC   Estimated Blood Loss: 10 mL from 1/19/2023  7:46 AM to 1/19/2023  9:03 AM      Drains: None  Specimens:   ID Type Source Tests Collected by Time Destination   1 : RIGHT THIRD METATARSAL HEAD Bone Biopsy Toe, Right SURGICAL PATHOLOGY EXAM Tiny Soto DPM 1/19/2023  8:34 AM    A : RIGHT FOOT SOFT TISSUE Tissue Foot, Right ANAEROBIC BACTERIAL CULTURE ROUTINE, AEROBIC BACTERIAL CULTURE ROUTINE Tiny Soto DPM 1/19/2023  8:31 AM      Findings:   Third metatarsal head exposed prior to case, decision made to resect this. No hayde purulence to site. Necrotic tendon and tissue excised.  Soft tissue cultures taken.     Complications: None.  Implants: * No implants in log *

## 2023-01-19 NOTE — OR NURSING
Pt seen by orthotics. Boot applied. IV removed. All belongings returned. Pt voided. Pt escorted out of hospital via wheelchair.

## 2023-01-22 LAB
BACTERIA TISS BX CULT: ABNORMAL

## 2023-01-25 ENCOUNTER — OFFICE VISIT (OUTPATIENT)
Dept: PODIATRY | Facility: CLINIC | Age: 63
End: 2023-01-25
Payer: COMMERCIAL

## 2023-01-25 VITALS
DIASTOLIC BLOOD PRESSURE: 64 MMHG | BODY MASS INDEX: 31.83 KG/M2 | HEIGHT: 72 IN | SYSTOLIC BLOOD PRESSURE: 124 MMHG | WEIGHT: 235 LBS

## 2023-01-25 DIAGNOSIS — Z98.890 S/P FOOT SURGERY, RIGHT: ICD-10-CM

## 2023-01-25 DIAGNOSIS — L97.512 RIGHT FOOT ULCER, WITH FAT LAYER EXPOSED (H): Primary | ICD-10-CM

## 2023-01-25 DIAGNOSIS — Z89.411 STATUS POST AMPUTATION OF RIGHT GREAT TOE (H): ICD-10-CM

## 2023-01-25 PROCEDURE — 99024 POSTOP FOLLOW-UP VISIT: CPT | Performed by: PODIATRIST

## 2023-01-25 NOTE — LETTER
1/25/2023         RE: Crispin Rojas  [address] [facility] [address]        Dear Colleague,    Thank you for referring your patient, Crispin Rojas, to the [facility]. Please see a copy of my visit note below.    [place] [facility]/FOOT & [facility]KLE SURG[facility]  CLINIC NOTE    CHIEF COMPLAINT:  right foot    PATIENT HISTORY:  Crispin Rojas is a 62 year old male who follows up for right foot. Had third metatarsal head excision and fourth metatarsal osteotomy on 1/19/23. Had appt one week prior to that with new large, submet three and four ulcer. Went on vacation and was prescribed ciprofloxacin. States doing okay currently. Some drainage, but not a lot. States is trying to stay off of his foot. Denies significant pain. Denies N/F/V/C/D.     Review of Systems:  A 10 point review of systems was performed and is positive for that noted above in the patient history.  All other areas are negative.     PAST MEDICAL HISTORY:   Past Medical History:   Diagnosis Date     Ascending aorta dilatation (H)      Cerebral infarction (H)     2010     Gastroesophageal reflux disease with esophagitis      H/O blood clots 2022     Hyperlipidemia LDL goal <70      Hypertension      Numbness and tingling      Obesity      GILA (obstructive sleep apnea) 10/22/2018    CPAP intolerant     PVD (peripheral vascular disease) (H)     s/p left partial toe amputation     Renal stone      Tremor      Type 2 diabetes mellitus with diabetic polyneuropathy, with long-term current use of insulin (H)         PAST SURGICAL HISTORY:   Past Surgical History:   Procedure Laterality Date     AMPUTATE FOOT Left 8/18/2016    Procedure: AMPUTATE FOOT;  Surgeon: Jack Younger DPM;  [facility]     AMPUTATE TOE(S) Right 2/1/2016    Procedure: AMPUTATE TOE(S);  Surgeon: Rachelle Manriquez DPM, Pod;  [facility]     AMPUTATE TOE(S) Right 8/2/2019    Procedure: Right fourth toe partial amputation for treatment of  osteomyelitis.;  Surgeon: Jack Younger DPM;  [facility]     AMPUTATE TOE(S) Left 11/8/2019    Procedure: Left second toe amputation at the metatarsophalangeal joint.;  Surgeon: Rachelle Manriquez DPM, Podiatry/Foot and Ankle Surgery;  [facility]     AMPUTATE TOE(S) Right 6/5/2022    Procedure: AMPUTATION OF RIGHT GREAT TOE;  Surgeon: Wili Quiles DPM;  [facility]     AMPUTATE TOE(S) Right 7/28/2022    Procedure: Right foot partial first metatarsal resection;  Surgeon: Tiny Soto DPM;  [facility]     ANGIOGRAM       BIOPSY BONE FOOT Right 7/24/2022    Procedure: Bone biopsy, right first metatarsal.;  Surgeon: Valentino Molina DPM;  [facility]     COLONOSCOPY       COMBINED CYSTOSCOPY, RETROGRADES, URETEROSCOPY, INSERT STENT Left 8/21/2016    Procedure: COMBINED CYSTOSCOPY, RETROGRADES, URETEROSCOPY, INSERT STENT;  Surgeon: Artemio Valenzuela MD;  [facility]     COMBINED CYSTOSCOPY, RETROGRADES, URETEROSCOPY, LASER HOLMIUM LITHOTRIPSY URETER(S), INSERT STENT Left 10/3/2016    Procedure: COMBINED CYSTOSCOPY, RETROGRADES, URETEROSCOPY, LASER HOLMIUM LITHOTRIPSY URETER(S), INSERT STENT;  Surgeon: Artemio Valenzuela MD;  [facility]     EXTRACORPOREAL SHOCK WAVE LITHOTRIPSY (ESWL) Left 9/8/2016    Procedure: EXTRACORPOREAL SHOCK WAVE LITHOTRIPSY (ESWL);  Surgeon: Artemio Valenzuela MD;  [facility]     INCISION AND DRAINAGE FOOT, COMBINED Right 7/24/2022    Procedure: Incision and drainage, right foot ;  Surgeon: Valentino Molina DPM;  [facility]     OSTEOTOMY FOOT Right 11/30/2022    Procedure: 1. Right second metatarsal floating osteotomy  2. Right second digit proximal phalanx arthroplasty 3. Right percutaneous flexor tenotomy;  Surgeon: Tiny Soto DPM;  [facility]     OSTEOTOMY FOOT Right 1/19/2023    Procedure: Right foot third metatarsal head excision, ulcer debridement, matatarsal osteotomies;  Surgeon: Brittany  Tiny JAUREGUI DPM;  Location: [facility]     RECESSION GASTROCNEMIUS Right 2/1/2016    Procedure: RECESSION GASTROCNEMIUS;  Surgeon: Rachelle Manriquez DPM, Pod;  [facility]        MEDICATIONS:  Reviewed in Epic.     ALLERGIES:    Allergies   Allergen Reactions     Hmg-Coa-R Inhibitors Swelling     Other reaction(s): Other (see comments)  SIMCOR caused edema in extremities       Bactrim [Sulfamethoxazole W/Trimethoprim] Nausea and Vomiting     Sulfa Drugs Nausea     Niacin Swelling     SIMCOR caused edema in extremities     Simvastatin Swelling     SIMCOR caused edema in extremities        SOCIAL HISTORY:   Social History     Socioeconomic History     Marital status:      Spouse name: Sydney     Number of children: 2     Years of education: Not on file     Highest education level: Master's degree (e.g., MA, MS, [place], MEd, MSW, ELIANA)   Occupational History     Occupation: marketing     Employer: [employer]     Comment: [employer]   Tobacco Use     Smoking status: Never     Smokeless tobacco: Never   Vaping Use     Vaping Use: Never used   Substance and Sexual Activity     Alcohol use: Yes     Comment: rare---red wine 3x per month     Drug use: Never     Sexual activity: Not Currently     Partners: Female   Other Topics Concern     Parent/sibling w/ CABG, MI or angioplasty before 65F 55M? No   Social History Narrative     Not on file     Social Determinants of Health     Financial Resource Strain: Low Risk      Difficulty of Paying Living Expenses: Not very hard   Food Insecurity: No Food Insecurity     Worried About Running Out of Food in the Last Year: Never true     Ran Out of Food in the Last Year: Never true   Transportation Needs: No Transportation Needs     Lack of Transportation (Medical): No     Lack of Transportation (Non-Medical): No   Physical Activity: Sufficiently Active     Days of Exercise per Week: 5 days     Minutes of Exercise per Session: 50 min   Stress: No Stress Concern Present     Feeling of  Stress : Not at all   Social Connections: Moderately Integrated     Frequency of Communication with Friends and Family: Twice a week     Frequency of Social Gatherings with Friends and Family: Never     Attends Alevism Services: More than 4 times per year     Active Member of Clubs or Organizations: Yes     Attends Club or Organization Meetings: Not on file     Marital Status:    Intimate Partner Violence: Not on file   Housing Stability: Low Risk      Unable to Pay for Housing in the Last Year: No     Number of Places Lived in the Last Year: 2     Unstable Housing in the Last Year: No        FAMILY HISTORY:   Family History   Problem Relation Age of Onset     Arthritis Mother         SLE     Connective Tissue Disorder Mother         lupus     Diabetes Mother      Cerebrovascular Disease Mother      Cancer Mother      Diabetes Father      Coronary Artery Disease Father      Chronic Obstructive Pulmonary Disease Father      Diabetes Sister      Diabetes Maternal Grandfather         EXAM:Vitals: There were no vitals taken for this visit.  BMI= There is no height or weight on file to calculate BMI.      General appearance: Patient is alert and fully cooperative with history & exam.  No sign of distress is noted during the visit.      Respiratory: Breathing is regular & unlabored while sitting.      HEENT: Hearing is intact to spoken word.  Speech is clear.  No gross evidence of visual impairment that would impact ambulation.      Dermatologic:  Right foot: submet three ulcer - no exposed bone. About half the size compared to preop. Granular. No cellulitis. Some dry drainage plantarly. Incision site intact over fourth toe.     Left foot submet 3 callus/preuclerative lesion, debrided to level of dermis. No open lesions or cellulitis.      Vascular: Dorsalis pedis and posterior tibial pulses are intact & regular bilaterally.  CFT and skin temperature is normal to both lower extremities.       Neurologic: Lower  extremity sensation is diminished, bilateral foot, to light touch.  No evidence of neurological-based weakness or contracture in the lower extremities.       Musculoskeletal: Patient is ambulatory without an assistive device or brace.  S/p right first ray resection. Right 2nd digit --> now more rectus.   S/p left hallux and second digit amputations     Psychiatric: Affect is pleasant & appropriate.      Cultures:     Foot, Right; Tissue    0 Result Notes  Culture 2+ Pseudomonas aeruginosa Abnormal        Isolated in broth only Enterococcus faecalis Abnormal     On day 1 of incubation   1+ Normal josé            Resulting Agency: [facility]     Susceptibility     Pseudomonas aeruginosa Enterococcus faecalis     JEFFREY JEFFREY     Amikacin <=2 ug/mL Susceptible       Ampicillin   <=2 ug/mL Susceptible     Cefepime <=1 ug/mL Susceptible       Ceftazidime <=1 ug/mL Susceptible       Ciprofloxacin 2 ug/mL Resistant       Gentamicin <=1 ug/mL Susceptible       Gentamicin Synergy   Susceptible... Susceptible 1     Levofloxacin >=8 ug/mL Resistant       Meropenem 1 ug/mL Susceptible       Penicillin   2 ug/mL Susceptible     Piperacillin/Tazobactam <=4 ug/mL Susceptible       Tobramycin <=1 ug/mL Susceptible       Vancomycin   1 ug/mL Susceptible                 Foot, Right; Tissue    0 Result Notes  Culture 4+ Bacteroides fragilis Abnormal     Susceptibilities not routinely done   2+ Anaerococcus species Abnormal     Susceptibilities not routinely done        Pathology:     Final Diagnosis   Right third toe, metatarsal head, amputation-  -Bone with changes consistent with extensive acute osteomyelitis.             ASSESSMENT:  1. S/p right foot third head resection, fourth metatarsal floating osteotomy, ulcer debridement      MEDICAL DECISION MAKING:   -Patient seen all results reviewed. Cultures show bacteria that is resistant to all oral medications. Path also shows osteomyelitis with unclear proximal bone margins.   -Discussed this  with him and recommended infectious disease follow up. He states he'd be agreeable to a PICC line if needed.   -No significant signs of infection currently, but difficult with limited oral options. Message sent to Dr. Sharma who patient has seen in the past.   -Discussed need to continue to be off of foot and keep it clean and dry.   -Will start every other day changes in order to monitor site. Should have decreasing drainage and no cellulitis or significant pain.   -F/u in 2 weeks. --> discussed if needed, can follow up sooner with another provider.       Tiny Soto DPM   [place] [facility]/Foot & Ankle Surgery      Greater than 30 minutes were spent today, reviewing previous hospital records, counseling and educating the patient on the ongoing treatment plan and coordinating care with other providers. Messages sent to infectious disease.               Again, thank you for allowing me to participate in the care of your patient.        Sincerely,        Tiny Soto DPM

## 2023-01-25 NOTE — PROGRESS NOTES
[facility]  CLINIC NOTE    CHIEF COMPLAINT:  right foot    PATIENT HISTORY:  Crispin Rojas is a 62 year old male who follows up for right foot. Had third metatarsal head excision and fourth metatarsal osteotomy on 1/19/23. Had appt one week prior to that with new large, submet three and four ulcer. Went on vacation and was prescribed ciprofloxacin. States doing okay currently. Some drainage, but not a lot. States is trying to stay off of his foot. Denies significant pain. Denies N/F/V/C/D.     Review of Systems:  A 10 point review of systems was performed and is positive for that noted above in the patient history.  All other areas are negative.     PAST MEDICAL HISTORY:   Past Medical History:   Diagnosis Date     Ascending aorta dilatation (H)      Cerebral infarction (H)     2010     Gastroesophageal reflux disease with esophagitis      H/O blood clots 2022     Hyperlipidemia LDL goal <70      Hypertension      Numbness and tingling      Obesity      GILA (obstructive sleep apnea) 10/22/2018    CPAP intolerant     PVD (peripheral vascular disease) (H)     s/p left partial toe amputation     Renal stone      Tremor      Type 2 diabetes mellitus with diabetic polyneuropathy, with long-term current use of insulin (H)         PAST SURGICAL HISTORY:   Past Surgical History:   Procedure Laterality Date     AMPUTATE FOOT Left 8/18/2016    Procedure: AMPUTATE FOOT;  Surgeon: Jack Younger DPM;  Location: [facility]     AMPUTATE TOE(S) Right 2/1/2016    Procedure: AMPUTATE TOE(S);  Surgeon: Rachelle Manriquez DPM, Pod;  Location: [facility]     AMPUTATE TOE(S) Right 8/2/2019    Procedure: Right fourth toe partial amputation for treatment of osteomyelitis.;  Surgeon: Jack Younger DPM;  Location: [facility]     AMPUTATE TOE(S) Left 11/8/2019    Procedure: Left second toe amputation at the metatarsophalangeal joint.;  Surgeon: Rachelle Manriquez DPM, Podiatry/Foot and Ankle Surgery;  Location: [facility]      AMPUTATE TOE(S) Right 6/5/2022    Procedure: AMPUTATION OF RIGHT GREAT TOE;  Surgeon: Wili Quiles DPM;  Location: [facility]     AMPUTATE TOE(S) Right 7/28/2022    Procedure: Right foot partial first metatarsal resection;  Surgeon: Tiny Soto DPM;  Location: [facility]     ANGIOGRAM       BIOPSY BONE FOOT Right 7/24/2022    Procedure: Bone biopsy, right first metatarsal.;  Surgeon: Valentino Molina DPM;  Location: [facility]     COLONOSCOPY       COMBINED CYSTOSCOPY, RETROGRADES, URETEROSCOPY, INSERT STENT Left 8/21/2016    Procedure: COMBINED CYSTOSCOPY, RETROGRADES, URETEROSCOPY, INSERT STENT;  Surgeon: Artemio Valenzuela MD;  Location: [facility]     COMBINED CYSTOSCOPY, RETROGRADES, URETEROSCOPY, LASER HOLMIUM LITHOTRIPSY URETER(S), INSERT STENT Left 10/3/2016    Procedure: COMBINED CYSTOSCOPY, RETROGRADES, URETEROSCOPY, LASER HOLMIUM LITHOTRIPSY URETER(S), INSERT STENT;  Surgeon: Artemio Valenzuela MD;  Location: [facility]     EXTRACORPOREAL SHOCK WAVE LITHOTRIPSY (ESWL) Left 9/8/2016    Procedure: EXTRACORPOREAL SHOCK WAVE LITHOTRIPSY (ESWL);  Surgeon: Artemio Valenzuela MD;  Location: [facility]     INCISION AND DRAINAGE FOOT, COMBINED Right 7/24/2022    Procedure: Incision and drainage, right foot ;  Surgeon: Valentino Molina DPM;  Location: [facility]     OSTEOTOMY FOOT Right 11/30/2022    Procedure: 1. Right second metatarsal floating osteotomy  2. Right second digit proximal phalanx arthroplasty 3. Right percutaneous flexor tenotomy;  Surgeon: Tiny Soto DPM;  Location: [facility]     OSTEOTOMY FOOT Right 1/19/2023    Procedure: Right foot third metatarsal head excision, ulcer debridement, matatarsal osteotomies;  Surgeon: Tiny Soto DPM;  Location: [facility]     RECESSION GASTROCNEMIUS Right 2/1/2016    Procedure: RECESSION GASTROCNEMIUS;  Surgeon: Rachelle Manriquez DPM, Pod;  Location: [facility]        MEDICATIONS:  Reviewed in Epic.     ALLERGIES:    Allergies   Allergen Reactions      Hmg-Coa-R Inhibitors Swelling     Other reaction(s): Other (see comments)  SIMCOR caused edema in extremities       Bactrim [Sulfamethoxazole W/Trimethoprim] Nausea and Vomiting     Sulfa Drugs Nausea     Niacin Swelling     SIMCOR caused edema in extremities     Simvastatin Swelling     SIMCOR caused edema in extremities        SOCIAL HISTORY:   Social History     Socioeconomic History     Marital status:      Spouse name: Sydney     Number of children: 2     Years of education: Not on file     Highest education level: Master's degree (e.g., MA, MS, Arleth, MEd, MSW, ELIANA)   Occupational History     Occupation: marketing     Employer: [employer]     Comment: [employer]   Tobacco Use     Smoking status: Never     Smokeless tobacco: Never   Vaping Use     Vaping Use: Never used   Substance and Sexual Activity     Alcohol use: Yes     Comment: rare---red wine 3x per month     Drug use: Never     Sexual activity: Not Currently     Partners: Female   Other Topics Concern     Parent/sibling w/ CABG, MI or angioplasty before 65F 55M? No   Social History Narrative     Not on file     Social Determinants of Health     Financial Resource Strain: Low Risk      Difficulty of Paying Living Expenses: Not very hard   Food Insecurity: No Food Insecurity     Worried About Running Out of Food in the Last Year: Never true     Ran Out of Food in the Last Year: Never true   Transportation Needs: No Transportation Needs     Lack of Transportation (Medical): No     Lack of Transportation (Non-Medical): No   Physical Activity: Sufficiently Active     Days of Exercise per Week: 5 days     Minutes of Exercise per Session: 50 min   Stress: No Stress Concern Present     Feeling of Stress : Not at all   Social Connections: Moderately Integrated     Frequency of Communication with Friends and Family: Twice a week     Frequency of Social Gatherings with Friends and Family: Never     Attends Jainism Services: More than 4 times per year      Active Member of Clubs or Organizations: Yes     Attends Club or Organization Meetings: Not on file     Marital Status:    Intimate Partner Violence: Not on file   Housing Stability: Low Risk      Unable to Pay for Housing in the Last Year: No     Number of Places Lived in the Last Year: 2     Unstable Housing in the Last Year: No        FAMILY HISTORY:   Family History   Problem Relation Age of Onset     Arthritis Mother         SLE     Connective Tissue Disorder Mother         lupus     Diabetes Mother      Cerebrovascular Disease Mother      Cancer Mother      Diabetes Father      Coronary Artery Disease Father      Chronic Obstructive Pulmonary Disease Father      Diabetes Sister      Diabetes Maternal Grandfather         EXAM:Vitals: There were no vitals taken for this visit.  BMI= There is no height or weight on file to calculate BMI.      General appearance: Patient is alert and fully cooperative with history & exam.  No sign of distress is noted during the visit.      Respiratory: Breathing is regular & unlabored while sitting.      HEENT: Hearing is intact to spoken word.  Speech is clear.  No gross evidence of visual impairment that would impact ambulation.      Dermatologic:  Right foot: submet three ulcer - no exposed bone. About half the size compared to preop. Granular. No cellulitis. Some dry drainage plantarly. Incision site intact over fourth toe.     Left foot submet 3 callus/preuclerative lesion, debrided to level of dermis. No open lesions or cellulitis.      Vascular: Dorsalis pedis and posterior tibial pulses are intact & regular bilaterally.  CFT and skin temperature is normal to both lower extremities.       Neurologic: Lower extremity sensation is diminished, bilateral foot, to light touch.  No evidence of neurological-based weakness or contracture in the lower extremities.       Musculoskeletal: Patient is ambulatory without an assistive device or brace.  S/p right first ray  resection. Right 2nd digit --> now more rectus.   S/p left hallux and second digit amputations     Psychiatric: Affect is pleasant & appropriate.      Cultures:     Foot, Right; Tissue    0 Result Notes  Culture 2+ Pseudomonas aeruginosa Abnormal        Isolated in broth only Enterococcus faecalis Abnormal     On day 1 of incubation   1+ Normal josé            Resulting Agency: [facility]     Susceptibility     Pseudomonas aeruginosa Enterococcus faecalis     JEFFREY JEFFREY     Amikacin <=2 ug/mL Susceptible       Ampicillin   <=2 ug/mL Susceptible     Cefepime <=1 ug/mL Susceptible       Ceftazidime <=1 ug/mL Susceptible       Ciprofloxacin 2 ug/mL Resistant       Gentamicin <=1 ug/mL Susceptible       Gentamicin Synergy   Susceptible... Susceptible 1     Levofloxacin >=8 ug/mL Resistant       Meropenem 1 ug/mL Susceptible       Penicillin   2 ug/mL Susceptible     Piperacillin/Tazobactam <=4 ug/mL Susceptible       Tobramycin <=1 ug/mL Susceptible       Vancomycin   1 ug/mL Susceptible                 Foot, Right; Tissue    0 Result Notes  Culture 4+ Bacteroides fragilis Abnormal     Susceptibilities not routinely done   2+ Anaerococcus species Abnormal     Susceptibilities not routinely done        Pathology:     Final Diagnosis   Right third toe, metatarsal head, amputation-  -Bone with changes consistent with extensive acute osteomyelitis.             ASSESSMENT:  1. S/p right foot third head resection, fourth metatarsal floating osteotomy, ulcer debridement      MEDICAL DECISION MAKING:   -Patient seen all results reviewed. Cultures show bacteria that is resistant to all oral medications. Path also shows osteomyelitis with unclear proximal bone margins.   -Discussed this with him and recommended infectious disease follow up. He states he'd be agreeable to a PICC line if needed.   -No significant signs of infection currently, but difficult with limited oral options. Message sent to Dr. Sharma who patient has seen in the  past.   -Discussed need to continue to be off of foot and keep it clean and dry.   -Will start every other day changes in order to monitor site. Should have decreasing drainage and no cellulitis or significant pain.   -F/u in 2 weeks. --> discussed if needed, can follow up sooner with another provider.       Tiny Soto DPM   [facility]      Greater than 30 minutes were spent today, reviewing previous hospital records, counseling and educating the patient on the ongoing treatment plan and coordinating care with other providers. Messages sent to infectious disease.

## 2023-01-26 ENCOUNTER — HOSPITAL ENCOUNTER (OUTPATIENT)
Facility: CLINIC | Age: 63
End: 2023-01-26
Payer: COMMERCIAL

## 2023-01-26 ENCOUNTER — HOSPITAL ENCOUNTER (OUTPATIENT)
Dept: ULTRASOUND IMAGING | Facility: CLINIC | Age: 63
Discharge: HOME | End: 2023-01-26
Attending: INTERNAL MEDICINE
Payer: COMMERCIAL

## 2023-01-26 ENCOUNTER — OFFICE VISIT (OUTPATIENT)
Dept: ENDOCRINOLOGY | Facility: CLINIC | Age: 63
End: 2023-01-26
Payer: COMMERCIAL

## 2023-01-26 ENCOUNTER — HOSPITAL ENCOUNTER (OUTPATIENT)
Dept: OUTPATIENT PROCEDURES | Facility: CLINIC | Age: 63
Discharge: HOME | End: 2023-01-26
Admitting: INTERNAL MEDICINE
Payer: COMMERCIAL

## 2023-01-26 ENCOUNTER — HOSPITAL ENCOUNTER (OUTPATIENT)
Dept: CARDIOLOGY | Facility: CLINIC | Age: 63
Discharge: HOME | End: 2023-01-26
Attending: INTERNAL MEDICINE
Payer: COMMERCIAL

## 2023-01-26 VITALS
BODY MASS INDEX: 31.63 KG/M2 | SYSTOLIC BLOOD PRESSURE: 138 MMHG | HEART RATE: 62 BPM | WEIGHT: 230 LBS | OXYGEN SATURATION: 96 % | DIASTOLIC BLOOD PRESSURE: 80 MMHG

## 2023-01-26 DIAGNOSIS — I10 BENIGN ESSENTIAL HYPERTENSION: ICD-10-CM

## 2023-01-26 DIAGNOSIS — K74.60 CIRRHOSIS OF LIVER WITHOUT ASCITES, UNSPECIFIED HEPATIC CIRRHOSIS TYPE (H): ICD-10-CM

## 2023-01-26 DIAGNOSIS — I77.810 MILD ASCENDING AORTA DILATATION (H): ICD-10-CM

## 2023-01-26 DIAGNOSIS — E11.42 TYPE 2 DIABETES MELLITUS WITH PERIPHERAL NEUROPATHY (H): Primary | ICD-10-CM

## 2023-01-26 PROBLEM — G25.0 ESSENTIAL TREMOR: Status: ACTIVE | Noted: 2022-10-03

## 2023-01-26 PROBLEM — H92.02 OTALGIA OF LEFT EAR: Status: ACTIVE | Noted: 2017-12-11

## 2023-01-26 PROBLEM — K75.81 NONALCOHOLIC STEATOHEPATITIS: Status: ACTIVE | Noted: 2022-11-30

## 2023-01-26 PROBLEM — K75.81 LIVER CIRRHOSIS SECONDARY TO NASH (H): Status: ACTIVE | Noted: 2023-01-26

## 2023-01-26 PROBLEM — K21.00 GASTRO-ESOPHAGEAL REFLUX DISEASE WITH ESOPHAGITIS: Status: ACTIVE | Noted: 2022-12-26

## 2023-01-26 LAB
BACTERIA TISS BX CULT: ABNORMAL
BACTERIA TISS BX CULT: ABNORMAL

## 2023-01-26 PROCEDURE — 86140 C-REACTIVE PROTEIN: CPT | Performed by: INTERNAL MEDICINE

## 2023-01-26 PROCEDURE — 85652 RBC SED RATE AUTOMATED: CPT | Performed by: INTERNAL MEDICINE

## 2023-01-26 PROCEDURE — 36569 INSJ PICC 5 YR+ W/O IMAGING: CPT

## 2023-01-26 PROCEDURE — 84450 TRANSFERASE (AST) (SGOT): CPT | Performed by: INTERNAL MEDICINE

## 2023-01-26 PROCEDURE — 82565 ASSAY OF CREATININE: CPT | Performed by: INTERNAL MEDICINE

## 2023-01-26 PROCEDURE — 84520 ASSAY OF UREA NITROGEN: CPT | Performed by: INTERNAL MEDICINE

## 2023-01-26 PROCEDURE — 85025 COMPLETE CBC W/AUTO DIFF WBC: CPT | Performed by: INTERNAL MEDICINE

## 2023-01-26 PROCEDURE — 99213 OFFICE O/P EST LOW 20 MIN: CPT | Performed by: NURSE PRACTITIONER

## 2023-01-26 PROCEDURE — 71275 CT ANGIOGRAPHY CHEST: CPT

## 2023-01-26 PROCEDURE — 36592 COLLECT BLOOD FROM PICC: CPT | Performed by: INTERNAL MEDICINE

## 2023-01-26 PROCEDURE — 76705 ECHO EXAM OF ABDOMEN: CPT

## 2023-01-26 PROCEDURE — 250N000011 HC RX IP 250 OP 636: Performed by: INTERNAL MEDICINE

## 2023-01-26 PROCEDURE — 95251 CONT GLUC MNTR ANALYSIS I&R: CPT | Performed by: NURSE PRACTITIONER

## 2023-01-26 PROCEDURE — 272N000748 HC KIT, CATH 3FR OR 4FR SINGLE LUMEN POWERMIDLINE

## 2023-01-26 RX ORDER — FLASH GLUCOSE SENSOR
KIT MISCELLANEOUS
Qty: 6 EACH | Refills: 3 | Status: SHIPPED | OUTPATIENT
Start: 2023-01-26 | End: 2024-02-16

## 2023-01-26 RX ORDER — DIPHENHYDRAMINE HYDROCHLORIDE 50 MG/ML
25-50 INJECTION INTRAMUSCULAR; INTRAVENOUS
Status: DISCONTINUED | OUTPATIENT
Start: 2023-01-26 | End: 2023-01-27 | Stop reason: HOSPADM

## 2023-01-26 RX ORDER — ACYCLOVIR 200 MG/1
0-1 CAPSULE ORAL
Status: DISCONTINUED | OUTPATIENT
Start: 2023-01-26 | End: 2023-01-27 | Stop reason: HOSPADM

## 2023-01-26 RX ORDER — IOPAMIDOL 755 MG/ML
500 INJECTION, SOLUTION INTRAVASCULAR ONCE
Status: COMPLETED | OUTPATIENT
Start: 2023-01-26 | End: 2023-01-26

## 2023-01-26 RX ORDER — ASPIRIN 81 MG/81MG
1 CAPSULE ORAL EVERY 24 HOURS
COMMUNITY
Start: 2022-11-30 | End: 2023-02-09

## 2023-01-26 RX ORDER — METHYLPREDNISOLONE SODIUM SUCCINATE 125 MG/2ML
125 INJECTION, POWDER, LYOPHILIZED, FOR SOLUTION INTRAMUSCULAR; INTRAVENOUS
Status: DISCONTINUED | OUTPATIENT
Start: 2023-01-26 | End: 2023-01-27 | Stop reason: HOSPADM

## 2023-01-26 RX ORDER — PRIMIDONE 50 MG/1
TABLET ORAL EVERY 12 HOURS
Status: ON HOLD | COMMUNITY
Start: 2022-11-30 | End: 2023-10-19

## 2023-01-26 RX ORDER — ONDANSETRON 2 MG/ML
4 INJECTION INTRAMUSCULAR; INTRAVENOUS
Status: DISCONTINUED | OUTPATIENT
Start: 2023-01-26 | End: 2023-01-27 | Stop reason: HOSPADM

## 2023-01-26 RX ORDER — DIPHENHYDRAMINE HCL 25 MG
25 CAPSULE ORAL
Status: DISCONTINUED | OUTPATIENT
Start: 2023-01-26 | End: 2023-01-27 | Stop reason: HOSPADM

## 2023-01-26 RX ADMIN — IOPAMIDOL 100 ML: 755 INJECTION, SOLUTION INTRAVENOUS at 08:59

## 2023-01-26 NOTE — PROCEDURES
[facility]    Single Lumen Midline Placement    Date/Time: 1/26/2023 11:50 AM  Performed by: Rosario Fernandez RN  Authorized by: Lara Collins MD   Indications: vascular access      UNIVERSAL PROTOCOL   Site Marked: Yes  Prior Images Obtained and Reviewed:  NA  Required items: Required blood products, implants, devices and special equipment available    Patient identity confirmed:  Verbally with patient, arm band, provided demographic data and hospital-assigned identification number  NA - No sedation, light sedation, or local anesthesia  Confirmation Checklist:  Patient's identity using two indicators, relevant allergies, procedure was appropriate and matched the consent or emergent situation and correct equipment/implants were available  Time out: Immediately prior to the procedure a time out was called    Universal Protocol: the Joint Commission Universal Protocol was followed    Preparation: Patient was prepped and draped in usual sterile fashion    ESBL (mL):  1     ANESTHESIA    Anesthesia: Local infiltration  Local Anesthetic:  Lidocaine 1% without epinephrine  Anesthetic Total (mL):  2.5      SEDATION    Patient Sedated: No        Preparation: skin prepped with ChloraPrep  Skin prep agent: skin prep agent completely dried prior to procedure  Sterile barriers: maximum sterile barriers were used: cap, mask, sterile gown, sterile gloves, and large sterile sheet  Hand hygiene: hand hygiene performed prior to central venous catheter insertion  Type of line used: Midline  Catheter type: single lumen  Lumen type: non-valved  Catheter size: 4 Fr  Brand: Bard  Lot number: [id number]  Placement method: venipuncture, MST and ultrasound  Number of attempts: 1  Difficulty threading catheter: no  Successful placement: yes  Orientation: left  Catheter to Vein (%): 12  Location: basilic vein  Tip Location: distal to axillary vein  Arm circumference: adults 10 cm  Extremity circumference: 30  Visible  catheter length: 2  Internal length: 13 cm  Total catheter length: 15  Dressing and securement: adhesive securement device, additional securement method (see comment), alcohol impregnated caps, blood cleaned with CHG, chlorhexidine disc applied, line secured, dressing applied, securement device, site cleansed, sterile dressing applied, subcutaneous anchor securement system and transparent dressing  Post procedure assessment: blood return through all ports and free fluid flow  PROCEDURE   Patient Tolerance:  Patient tolerated the procedure well with no immediate complicationsDescribe Procedure: Single lumen midline inserted in left basilic vein difficulty. Lumen has good blood return and flushes easily. Midline tip is distal to axilla. Pt given all midline instructions/education and information packet. All questions answered. MIDLINE is OK TO USE. See flowsheet for additional details.

## 2023-01-26 NOTE — LETTER
"    1/26/2023         RE: Crispin Rojas  [address] [facility] [address]        Dear Colleague,    Thank you for referring your patient, Crispin Rojas, to the [facility]. Please see a copy of my visit note below.    [facility] ENDOCRINOLOGY    Diabetes Note 1/26/2023    Crispin Rojas, 1960, [id number]          Reason for visit      1. Type 2 diabetes mellitus with peripheral neuropathy (H)        HPI     Crispin Rojas is a very pleasant 62 year old old male who presents for follow up.  SUMMARY:    Judie is here today in f/u for DM 2. He is currently sporting a walking boot on his R foot. He just had surgery last week to debride an ulceration on his foot. He was unaware that the ulcer had become infected and he thought that his BG elevation were due to it being \"winter time\" and being less active. Treatment and occasional Osteomyelitis has been an ongoing problem for him, for which he has even had Hyperbaric treatments to help with the healing. He is getting IV antibiotics for treatment at present.     His Corwin download shows a lot of hypoglycemia for my taste. It hasn't gotten \"scary\" low for him, but he has definitely had a fair amount of it. He feels that because his BG have been high that he has been a bit aggressive with his insulin dosing and the hypoglycemia has been the result. The last 4 days, he hasn't had the large spikes in his glucose, so it is just a matter of kind of waiting it out. He is not taking any insulin at all at present, and is just managing with the Metformin and the Ozempic. He is a little frustrated that he cannot do any kind of working out with his foot the way it is.  He was without his Ozempic for 2 weeks because of the shortage.     He was surprised the next morning after shoveling and not eating much for dinner afterwards, that his FBS was elevated. Educated on the body's ability to get glucose out of storage when it is " unhappy with previous meal choices.       Blood glucose data:      Past Medical History     Patient Active Problem List   Diagnosis     Calculus of kidney     Chronic left shoulder pain     Type 2 diabetes mellitus with peripheral neuropathy (H)     Hyperlipidemia LDL goal <70     Essential hypertension     Right bundle branch block     GILA (obstructive sleep apnea)     Diabetic ulcer of lower extremity (H)     PVD (peripheral vascular disease) (H)     Scoliosis of thoracic spine, unspecified scoliosis type     Other sequelae following unspecified cerebrovascular disease     Cervical pain     Bilateral thoracic back pain     Osteomyelitis of great toe of right foot (H)     Acute deep vein thrombosis (DVT) of tibial vein of right lower extremity (H)     Abnormal CT of liver     Benign neoplasm of transverse colon     Duodenitis     Hemorrhoids     Polyp of colon     Acute osteomyelitis of right foot (H)     Essential tremor     Nonalcoholic steatohepatitis     Otalgia of left ear     Shoulder pain     Liver cirrhosis secondary to SNYDER (H)     Gastro-esophageal reflux disease with esophagitis        Family History       family history includes Arthritis in his mother; Cancer in his mother; Cerebrovascular Disease in his mother; Chronic Obstructive Pulmonary Disease in his father; Connective Tissue Disorder in his mother; Coronary Artery Disease in his father; Diabetes in his father, maternal grandfather, mother, and sister.    Social History      reports that he has never smoked. He has never used smokeless tobacco. He reports current alcohol use. He reports that he does not use drugs.      Review of Systems     Patient has no polyuria or polydipsia, no chest pain, dyspnea or TIA's, no numbness, tingling or pain in extremities  Remainder negative except as noted in HPI.      Vital Signs     /80 (BP Location: Right arm, Patient Position: Sitting, Cuff Size: Adult Large)   Pulse 62   Wt 104.3 kg (230 lb)   SpO2  96%   BMI 31.63 kg/m    Wt Readings from Last 3 Encounters:   01/26/23 104.3 kg (230 lb)   01/25/23 106.6 kg (235 lb)   01/19/23 106.7 kg (235 lb 4.8 oz)       Physical Exam     Constitutional:  Well developed, Well nourished  HENT:  Normocephalic,   Neck: Thyroid normal, No lymph nodes, Supple  Eyes:  PERRL, Conjunctiva pink  Respiratory:  Normal breath sounds, No respiratory distress  Cardiovascular:  Normal heart rate, Normal rhythm, No murmurs  GI:  Bowel sounds normal, Soft, No tenderness  Musculoskeletal:  No gross deformity or lesions, normal dorsalis pedis pulses  Skin: No acanthosis nigricans,  Neurologic:  Alert & oriented x 3, nonfocal  Psychiatric:  Affect, Mood, Insight appropriate      Assessment     1. Type 2 diabetes mellitus with peripheral neuropathy (H)        Plan     Pat will continue watching his BG and will add back his insulin as needed. I will see him back as scheduled.       Shira Montanez NP  [facility]  1/26/2023  1:12 PM          Lab Results     No results found for: HGBA1C, CREATININE, MICROALBUR    Cholesterol   Date Value Ref Range Status   01/09/2023 107 <200 mg/dL Final   06/22/2021 95 <200 mg/dL Final     HDL Cholesterol   Date Value Ref Range Status   06/22/2021 42 >39 mg/dL Final     Direct Measure HDL   Date Value Ref Range Status   01/09/2023 42 >=40 mg/dL Final     Triglycerides   Date Value Ref Range Status   01/09/2023 99 <150 mg/dL Final   06/22/2021 42 <150 mg/dL Final     Comment:     Fasting specimen             Current Medications     Outpatient Medications Prior to Visit   Medication Sig Dispense Refill     amLODIPine (NORVASC) 5 MG tablet Take 1 tablet (5 mg) by mouth 2 times daily Take one tablet twice daily  SEE MD THIS QUARTER 200 tablet 5     Aspirin (VAZALORE) 81 MG CAPS Take 1 capsule by mouth every 24 hours       atorvastatin (LIPITOR) 40 MG tablet Take 1 tablet (40 mg) by mouth daily Take one tablet daily SEE PROVIDER FOR NEXT REFILL 100 tablet 5      blood glucose (NO BRAND SPECIFIED) lancets standard Use to test blood sugar 3 times daily or as directed. 300 each 3     calcium carbonate (OS-NARA) 500 MG tablet Take 1 tablet by mouth 2 times daily       cephALEXin (KEFLEX) 500 MG capsule TAKE 1 CAPSULE BY MOUTH THREE TIMES DAILY X 3 MONTHS       Cholecalciferol (VITAMIN D3) 25 MCG (1000 UT) CAPS Take 1,000 Units by mouth daily        cholecalciferol 25 MCG (1000 UT) TABS Take 1 tablet by mouth daily       ciprofloxacin (CIPRO) 500 MG tablet Take 1 tablet (500 mg) by mouth 2 times daily for 7 days 16 tablet 0     Co-Enzyme Q10 100 MG CAPS Take 100 mg by mouth daily        collagenase (SANTYL) 250 UNIT/GM external ointment Apply topically daily 30 g 1     ELIQUIS ANTICOAGULANT 5 MG tablet Take 1 tablet (5 mg) by mouth 2 times daily 200 tablet 5     gabapentin (NEURONTIN) 100 MG capsule Take 400 mg by mouth 3 times daily       hydrochlorothiazide (MICROZIDE) 12.5 MG capsule Take 1 capsule (12.5 mg) by mouth every morning 100 capsule 5     Insulin Aspart FlexPen 100 UNIT/ML SOPN USE DAILY WITH MEALS, USING UP TO 70 UNITS PER DAY 30 mL 0     insulin degludec (TRESIBA FLEXTOUCH) 100 UNIT/ML pen 45-55 units subcutaneous daily.  Resume as your diet improves.  Start with 1/3 of your home dose and titrate up to your normal home dose as your diet improves and blood sugars remain normal. 45 mL 0     insulin pen needle (B-D U/F) 31G X 5 MM miscellaneous USE TO INJECT LANTUS TWICE DAILY AND NOVOLOG THREE TIMES DAILY AS DIRECTED 500 each 0     ketoconazole (NIZORAL) 2 % external shampoo APPLY TOPICALLY DAILY AS NEEDED FOR ITCHING OR IRRITATION. SEE MD AFTER 6 WEEKS 120 mL 3     lisinopril (ZESTRIL) 40 MG tablet Take 1 tablet (40 mg) by mouth daily 100 tablet 5     MAGNESIUM GLYCINATE PLUS PO Take 400 mg by mouth 2 times daily       metFORMIN (GLUCOPHAGE XR) 500 MG 24 hr tablet TAKE 2 TABLETS BY MOUTH TWICE DAILY 360 tablet 0     Multiple Vitamins-Minerals (MULTIVITAMIN PO) Take  1 tablet by mouth daily        Omega-3 Fatty Acids (OMEGA-3 FISH OIL PO) Take 1 g by mouth 2 times daily (with meals)        omeprazole (PRILOSEC) 40 MG DR capsule Take 40 mg by mouth daily       OZEMPIC, 1 MG/DOSE, 4 MG/3ML SOPN INJECT 1MG SUBCUTANEOUSLY AS DIRECTED EVERY 7 DAYS ON WEDNESDAYS 3 mL 0     primidone (MYSOLINE) 50 MG tablet Take by mouth every 12 hours       Continuous Blood Gluc Sensor (FREESTYLE LUCERO 14 DAY SENSOR) MISC USE ONE EVERY 14 DAYS 6 each 3     primidone (MYSOLINE) 50 MG tablet 2 times daily       tadalafil (CIALIS) 5 MG tablet TAKE 1 TABLET BY MOUTH EVERY DAY AS NEEDED one hour before intercourse 30 tablet 1     No facility-administered medications prior to visit.             Lucero:                  Again, thank you for allowing me to participate in the care of your patient.        Sincerely,        Shira Montanez NP

## 2023-01-26 NOTE — PROGRESS NOTES
"[facility] ENDOCRINOLOGY    Diabetes Note 1/26/2023    Crispin Rojas, 1960, [id number]          Reason for visit      1. Type 2 diabetes mellitus with peripheral neuropathy (H)        HPI     Crispin Rojas is a very pleasant 62 year old old male who presents for follow up.  SUMMARY:    Judie is here today in f/u for DM 2. He is currently sporting a walking boot on his R foot. He just had surgery last week to debride an ulceration on his foot. He was unaware that the ulcer had become infected and he thought that his BG elevation were due to it being \"winter time\" and being less active. Treatment and occasional Osteomyelitis has been an ongoing problem for him, for which he has even had Hyperbaric treatments to help with the healing. He is getting IV antibiotics for treatment at present.     His Corwin download shows a lot of hypoglycemia for my taste. It hasn't gotten \"scary\" low for him, but he has definitely had a fair amount of it. He feels that because his BG have been high that he has been a bit aggressive with his insulin dosing and the hypoglycemia has been the result. The last 4 days, he hasn't had the large spikes in his glucose, so it is just a matter of kind of waiting it out. He is not taking any insulin at all at present, and is just managing with the Metformin and the Ozempic. He is a little frustrated that he cannot do any kind of working out with his foot the way it is.  He was without his Ozempic for 2 weeks because of the shortage.     He was surprised the next morning after shoveling and not eating much for dinner afterwards, that his FBS was elevated. Educated on the body's ability to get glucose out of storage when it is unhappy with previous meal choices.       Blood glucose data:      Past Medical History     Patient Active Problem List   Diagnosis     Calculus of kidney     Chronic left shoulder pain     Type 2 diabetes mellitus with peripheral neuropathy (H)     Hyperlipidemia " LDL goal <70     Essential hypertension     Right bundle branch block     GILA (obstructive sleep apnea)     Diabetic ulcer of lower extremity (H)     PVD (peripheral vascular disease) (H)     Scoliosis of thoracic spine, unspecified scoliosis type     Other sequelae following unspecified cerebrovascular disease     Cervical pain     Bilateral thoracic back pain     Osteomyelitis of great toe of right foot (H)     Acute deep vein thrombosis (DVT) of tibial vein of right lower extremity (H)     Abnormal CT of liver     Benign neoplasm of transverse colon     Duodenitis     Hemorrhoids     Polyp of colon     Acute osteomyelitis of right foot (H)     Essential tremor     Nonalcoholic steatohepatitis     Otalgia of left ear     Shoulder pain     Liver cirrhosis secondary to SNYDER (H)     Gastro-esophageal reflux disease with esophagitis        Family History       family history includes Arthritis in his mother; Cancer in his mother; Cerebrovascular Disease in his mother; Chronic Obstructive Pulmonary Disease in his father; Connective Tissue Disorder in his mother; Coronary Artery Disease in his father; Diabetes in his father, maternal grandfather, mother, and sister.    Social History      reports that he has never smoked. He has never used smokeless tobacco. He reports current alcohol use. He reports that he does not use drugs.      Review of Systems     Patient has no polyuria or polydipsia, no chest pain, dyspnea or TIA's, no numbness, tingling or pain in extremities  Remainder negative except as noted in HPI.      Vital Signs     /80 (BP Location: Right arm, Patient Position: Sitting, Cuff Size: Adult Large)   Pulse 62   Wt 104.3 kg (230 lb)   SpO2 96%   BMI 31.63 kg/m    Wt Readings from Last 3 Encounters:   01/26/23 104.3 kg (230 lb)   01/25/23 106.6 kg (235 lb)   01/19/23 106.7 kg (235 lb 4.8 oz)       Physical Exam     Constitutional:  Well developed, Well nourished  HENT:  Normocephalic,   Neck:  Thyroid normal, No lymph nodes, Supple  Eyes:  PERRL, Conjunctiva pink  Respiratory:  Normal breath sounds, No respiratory distress  Cardiovascular:  Normal heart rate, Normal rhythm, No murmurs  GI:  Bowel sounds normal, Soft, No tenderness  Musculoskeletal:  No gross deformity or lesions, normal dorsalis pedis pulses  Skin: No acanthosis nigricans,  Neurologic:  Alert & oriented x 3, nonfocal  Psychiatric:  Affect, Mood, Insight appropriate      Assessment     1. Type 2 diabetes mellitus with peripheral neuropathy (H)        Plan     Pat will continue watching his BG and will add back his insulin as needed. I will see him back as scheduled.       Shira Montanez NP  [facility]  1/26/2023  1:12 PM          Lab Results     No results found for: HGBA1C, CREATININE, MICROALBUR    Cholesterol   Date Value Ref Range Status   01/09/2023 107 <200 mg/dL Final   06/22/2021 95 <200 mg/dL Final     HDL Cholesterol   Date Value Ref Range Status   06/22/2021 42 >39 mg/dL Final     Direct Measure HDL   Date Value Ref Range Status   01/09/2023 42 >=40 mg/dL Final     Triglycerides   Date Value Ref Range Status   01/09/2023 99 <150 mg/dL Final   06/22/2021 42 <150 mg/dL Final     Comment:     Fasting specimen             Current Medications     Outpatient Medications Prior to Visit   Medication Sig Dispense Refill     amLODIPine (NORVASC) 5 MG tablet Take 1 tablet (5 mg) by mouth 2 times daily Take one tablet twice daily  SEE MD THIS QUARTER 200 tablet 5     Aspirin (VAZALORE) 81 MG CAPS Take 1 capsule by mouth every 24 hours       atorvastatin (LIPITOR) 40 MG tablet Take 1 tablet (40 mg) by mouth daily Take one tablet daily SEE PROVIDER FOR NEXT REFILL 100 tablet 5     blood glucose (NO BRAND SPECIFIED) lancets standard Use to test blood sugar 3 times daily or as directed. 300 each 3     calcium carbonate (OS-NAAR) 500 MG tablet Take 1 tablet by mouth 2 times daily       cephALEXin (KEFLEX) 500 MG capsule TAKE 1 CAPSULE BY  MOUTH THREE TIMES DAILY X 3 MONTHS       Cholecalciferol (VITAMIN D3) 25 MCG (1000 UT) CAPS Take 1,000 Units by mouth daily        cholecalciferol 25 MCG (1000 UT) TABS Take 1 tablet by mouth daily       ciprofloxacin (CIPRO) 500 MG tablet Take 1 tablet (500 mg) by mouth 2 times daily for 7 days 16 tablet 0     Co-Enzyme Q10 100 MG CAPS Take 100 mg by mouth daily        collagenase (SANTYL) 250 UNIT/GM external ointment Apply topically daily 30 g 1     ELIQUIS ANTICOAGULANT 5 MG tablet Take 1 tablet (5 mg) by mouth 2 times daily 200 tablet 5     gabapentin (NEURONTIN) 100 MG capsule Take 400 mg by mouth 3 times daily       hydrochlorothiazide (MICROZIDE) 12.5 MG capsule Take 1 capsule (12.5 mg) by mouth every morning 100 capsule 5     Insulin Aspart FlexPen 100 UNIT/ML SOPN USE DAILY WITH MEALS, USING UP TO 70 UNITS PER DAY 30 mL 0     insulin degludec (TRESIBA FLEXTOUCH) 100 UNIT/ML pen 45-55 units subcutaneous daily.  Resume as your diet improves.  Start with 1/3 of your home dose and titrate up to your normal home dose as your diet improves and blood sugars remain normal. 45 mL 0     insulin pen needle (B-D U/F) 31G X 5 MM miscellaneous USE TO INJECT LANTUS TWICE DAILY AND NOVOLOG THREE TIMES DAILY AS DIRECTED 500 each 0     ketoconazole (NIZORAL) 2 % external shampoo APPLY TOPICALLY DAILY AS NEEDED FOR ITCHING OR IRRITATION. SEE MD AFTER 6 WEEKS 120 mL 3     lisinopril (ZESTRIL) 40 MG tablet Take 1 tablet (40 mg) by mouth daily 100 tablet 5     MAGNESIUM GLYCINATE PLUS PO Take 400 mg by mouth 2 times daily       metFORMIN (GLUCOPHAGE XR) 500 MG 24 hr tablet TAKE 2 TABLETS BY MOUTH TWICE DAILY 360 tablet 0     Multiple Vitamins-Minerals (MULTIVITAMIN PO) Take 1 tablet by mouth daily        Omega-3 Fatty Acids (OMEGA-3 FISH OIL PO) Take 1 g by mouth 2 times daily (with meals)        omeprazole (PRILOSEC) 40 MG DR capsule Take 40 mg by mouth daily       OZEMPIC, 1 MG/DOSE, 4 MG/3ML SOPN INJECT 1MG SUBCUTANEOUSLY AS  DIRECTED EVERY 7 DAYS ON WEDNESDAYS 3 mL 0     primidone (MYSOLINE) 50 MG tablet Take by mouth every 12 hours       Continuous Blood Gluc Sensor (FREESTYLE LUCERO 14 DAY SENSOR) [facility] USE ONE EVERY 14 DAYS 6 each 3     primidone (MYSOLINE) 50 MG tablet 2 times daily       tadalafil (CIALIS) 5 MG tablet TAKE 1 TABLET BY MOUTH EVERY DAY AS NEEDED one hour before intercourse 30 tablet 1     No facility-administered medications prior to visit.             Lucero:

## 2023-01-27 ENCOUNTER — TRANSFERRED RECORDS (OUTPATIENT)
Dept: HEALTH INFORMATION MANAGEMENT | Facility: CLINIC | Age: 63
End: 2023-01-27
Payer: COMMERCIAL

## 2023-01-27 ENCOUNTER — TRANSFERRED RECORDS (OUTPATIENT)
Dept: SURGERY | Facility: CLINIC | Age: 63
End: 2023-01-27
Payer: COMMERCIAL

## 2023-01-27 ENCOUNTER — LAB REQUISITION (OUTPATIENT)
Dept: LAB | Facility: CLINIC | Age: 63
End: 2023-01-27
Payer: COMMERCIAL

## 2023-01-27 DIAGNOSIS — R78.81 BACTEREMIA: ICD-10-CM

## 2023-01-27 LAB
AST SERPL W P-5'-P-CCNC: 24 U/L (ref 10–50)
BASOPHILS # BLD AUTO: 0.1 10E3/UL (ref 0–0.2)
BASOPHILS NFR BLD AUTO: 1 %
BUN SERPL-MCNC: 19.6 MG/DL (ref 8–23)
CREAT SERPL-MCNC: 0.99 MG/DL (ref 0.67–1.17)
CRP SERPL-MCNC: 34.92 MG/L
EOSINOPHIL # BLD AUTO: 0.3 10E3/UL (ref 0–0.7)
EOSINOPHIL NFR BLD AUTO: 5 %
ERYTHROCYTE [DISTWIDTH] IN BLOOD BY AUTOMATED COUNT: 13.6 % (ref 10–15)
ERYTHROCYTE [SEDIMENTATION RATE] IN BLOOD BY WESTERGREN METHOD: 65 MM/HR (ref 0–20)
GFR SERPL CREATININE-BSD FRML MDRD: 87 ML/MIN/1.73M2
HCT VFR BLD AUTO: 35.5 % (ref 40–53)
HGB BLD-MCNC: 11 G/DL (ref 13.3–17.7)
IMM GRANULOCYTES # BLD: 0 10E3/UL
IMM GRANULOCYTES NFR BLD: 0 %
LYMPHOCYTES # BLD AUTO: 1.7 10E3/UL (ref 0.8–5.3)
LYMPHOCYTES NFR BLD AUTO: 30 %
MCH RBC QN AUTO: 27.2 PG (ref 26.5–33)
MCHC RBC AUTO-ENTMCNC: 31 G/DL (ref 31.5–36.5)
MCV RBC AUTO: 88 FL (ref 78–100)
MONOCYTES # BLD AUTO: 0.4 10E3/UL (ref 0–1.3)
MONOCYTES NFR BLD AUTO: 8 %
NEUTROPHILS # BLD AUTO: 3.3 10E3/UL (ref 1.6–8.3)
NEUTROPHILS NFR BLD AUTO: 56 %
NRBC # BLD AUTO: 0 10E3/UL
NRBC BLD AUTO-RTO: 0 /100
PLATELET # BLD AUTO: 237 10E3/UL (ref 150–450)
RBC # BLD AUTO: 4.05 10E6/UL (ref 4.4–5.9)
WBC # BLD AUTO: 5.7 10E3/UL (ref 4–11)

## 2023-01-29 ENCOUNTER — NURSE TRIAGE (OUTPATIENT)
Dept: NURSING | Facility: CLINIC | Age: 63
End: 2023-01-29
Payer: COMMERCIAL

## 2023-01-29 NOTE — TELEPHONE ENCOUNTER
Had surgery on foot on 1/19/23  -Right foot third metatarsal head excision, ulcer debridement, matatarsal osteotomiesRight foot third metatarsal head excision, ulcer debridement, matatarsal osteotomies    Had follow up with provider 1/25  -did a dressing change   -said not to change dressing unless there was a lot of seepage    There was a fair amount of seepage, so he did his a dressing change tonight at about 9pm  -some clear fluid  -some bloody fluid    He is concerned because the 4th Toe is a dark red to maybe a black color and has a waxy feeling to it   -unsure if it is just dried blood  -unsure where the incisions are   -unsure if it looked like this when he went to his post-op appointment.     Has neuropathy so the feeling in his feet is very compromised  -has had very little pain since surgery  -still can feel the same pressure on the toe as normal   -no increased pain    No fever  No nausea  No chills  No vomiting  No chest pain   No difficulty breathing  No swelling    Went over surgical note, and it does look like there was some surgery on the 4th toe. Patient states this makes him feel relieved, as he feels that it explains the coloration. He is no longer concerned, however advised him to at least send the pics he took to the podiatry care team to look at. Patient intends to leave the dressing in place until there is too much seepage coming through or he hears back from the provider. Will call back with any worsening symptoms or changes.   Reason for Disposition    [1] Caller has NON-URGENT question AND [2] triager unable to answer question    Additional Information    Negative: [1] Major abdominal surgical incision AND [2] wound gaping open AND [3] visible internal organs    Negative: Sounds like a life-threatening emergency to the triager    Negative: Patient has a Negative Pressure Wound Therapy device    Negative: Patient is followed by a wound clinic or wound specialist for this wound    Negative:  [1] Bleeding from incision AND [2] won't stop after 10 minutes of direct pressure    Negative: [1] Bleeding (more than a few drops) from incision AND [2] blood vessel surgery (e.g., carotid endarterectomy, femoral bypass graft, kidney dialysis fistula, tracheostomy)    Negative: [1] Widespread rash AND [2] bright red, sunburn-like    Negative: Severe pain in the incision    Negative: [1] Incision gaping open AND [2] < 48 hours since wound re-opened    Negative: [1] Incision gaping open AND [2] length of opening > 2 inches (5 cm)    Negative: Patient sounds very sick or weak to the triager    Negative: Sounds like a serious complication to the triager    Negative: Fever > 100.4 F (38.0 C)    Negative: [1] Incision looks infected (spreading redness, pain) AND [2] fever > 99.5 F (37.5 C)    Negative: [1] Incision looks infected (spreading redness, pain) AND [2] large red area (> 2 in. or 5 cm)    Negative: [1] Incision looks infected (spreading redness, pain) AND [2] face wound    Negative: [1] Red streak runs from the incision AND [2] longer than 1 inch (2.5 cm)    Negative: [1] Pus or bad-smelling fluid draining from incision AND [2] no fever    Negative: [1] Post-op pain AND [2] not controlled with pain medications    Negative: Dressing soaked with blood or body fluid (e.g., drainage)    Negative: [1] Scant bleeding (e.g., few drops) from incision AND [2] blood vessel surgery (e.g., carotid endarterectomy, femoral bypass graft, kidney dialysis fistula    Negative: [1] Raised bruise and [2] size > 2 inches (5 cm) and expanding    Negative: [1] Caller has URGENT question AND [2] triager unable to answer question    Negative: [1] INCREASING pain in incision AND [2] > 2 days (48 hours) since surgery    Negative: [1] Small red area or streak AND [2] no fever    Negative: [1] Clear or blood-tinged fluid draining from wound AND [2] no fever    Negative: [1] Incision gaping open AND [2] > 48 hours since wound re-opened AND  [3] length of opening > 1/2 inch (12 mm)    Negative: [1] Incision on face gaping open after skin glue AND [2] > 48 hours since wound re-opened AND [3] length of opening > 1/4 inch (6 mm)    Negative: Suture or staple removal is overdue    Negative: [1] Suture or staple came out early AND [2] caller wants wound checked    Protocols used: POST-OP INCISION SYMPTOMS AND [id number]    Janette Traylor RN on 1/29/2023 at 3:28 AM

## 2023-01-30 ENCOUNTER — MYC MEDICAL ADVICE (OUTPATIENT)
Dept: ORTHOPEDICS | Facility: CLINIC | Age: 63
End: 2023-01-30
Payer: COMMERCIAL

## 2023-01-30 NOTE — TELEPHONE ENCOUNTER
Please see Mychart photo and nurse triage encounter dated 1/29/23 and advise if there are concerns with the 4th toe or is to be expected.     Routing to provider on call in Dr. Soto's absence.     AMMON Ceron RN

## 2023-01-30 NOTE — TELEPHONE ENCOUNTER
I would apply betadine to the incision area at this time and dress with gauze and gauze roll and ace.     Recommend follow up with me in clinic tomorrow or this week to assess.    Thanks,     Rachelle Manriquez DPM

## 2023-01-30 NOTE — TELEPHONE ENCOUNTER
Phone call to patient and he was informed of recommendations below. He has been wrapping it exactly as requested. Appointment scheduled for 7:45 am on 1/31/23 with Dr. Manriquez with 7:30 am check in . He verbalized understanding.     AMMON Ceron RN

## 2023-01-30 NOTE — TELEPHONE ENCOUNTER
Patient sent [employer] photo.   Routed photo encounter to Dr. Manriquez on call.     AMMON Ceron RN

## 2023-01-31 ENCOUNTER — OFFICE VISIT (OUTPATIENT)
Dept: PODIATRY | Facility: CLINIC | Age: 63
End: 2023-01-31
Payer: COMMERCIAL

## 2023-01-31 VITALS
WEIGHT: 230 LBS | DIASTOLIC BLOOD PRESSURE: 72 MMHG | BODY MASS INDEX: 32.2 KG/M2 | SYSTOLIC BLOOD PRESSURE: 138 MMHG | HEIGHT: 71 IN

## 2023-01-31 DIAGNOSIS — Z98.890 POST-OPERATIVE STATE: ICD-10-CM

## 2023-01-31 DIAGNOSIS — S98.111A AMPUTATION OF RIGHT GREAT TOE (H): ICD-10-CM

## 2023-01-31 DIAGNOSIS — E11.42 DIABETIC POLYNEUROPATHY ASSOCIATED WITH TYPE 2 DIABETES MELLITUS (H): Primary | ICD-10-CM

## 2023-01-31 DIAGNOSIS — L97.512 ULCER OF RIGHT FOOT WITH FAT LAYER EXPOSED (H): ICD-10-CM

## 2023-01-31 PROCEDURE — 99024 POSTOP FOLLOW-UP VISIT: CPT | Performed by: PODIATRIST

## 2023-01-31 NOTE — PROGRESS NOTES
"Podiatry / Foot and Ankle Surgery Progress Note    January 31, 2023    Subject: Patient was seen for 2-week status post right third metatarsal head excision with Dr. Soto.  Notes he was concerned about the darkening of the right fourth toe that started a few days ago.  Denies fever, nausea, vomiting.  No injury.  No pain but has baseline neuropathy.    Objective:  Vitals: /72   Ht 1.803 m (5' 11\")   Wt 104.3 kg (230 lb)   BMI 32.08 kg/m      A1C: 5.6 (11/29/2022)    General:  Patient is alert and orientated.  NAD.    Vascular:  DP and PT pulses are palpable.  No edema or varicosities noted.  CFT's < 3secs.  Skin temp is normal.    Neuro:  Light and gross touch sensation absent toe feet.    Derm: Superficial eschar noted over the dorsal aspect of the right fourth toe.  Appears to have new skin underneath.  Sutures are in tact to the dorsal aspect of the right foot.  Also intact to the plantar aspect of the right foot through the ulceration.  Full-thickness ulcer to the plantar aspect of the right third metatarsal head area measures approximately 3-1/2 cm x 1-1/2 cm x 0.3 cm.  Base of the wound is granular.  No surrounding erythema purulent drainage or malodor noted.    Musculoskeletal:  Right great toe and 4th toes amputated.     Assessment:    Diabetic polyneuropathy associated with type 2 diabetes mellitus (H)  Post-operative state  Amputation of right great toe (H)  Ulcer of right foot with fat layer exposed (H)      Medical Decision Making/Plan: At this time the dressing was changed.  The eschar appears to be very superficial with new skin underneath.  We will have him continue to keep the foot and dressing dry.  We will have him continue to monitor this.  Did not remove the stitches today.  He has a follow-up with Dr. Soto next week.  All questions were answered to patient's satisfaction and he will call further questions or concerns.      Patient Risk Factor:  Patient is a medium risk factor for " infection.     Rachelle Manriquez DPM, Podiatry/Foot and Ankle Surgery

## 2023-01-31 NOTE — LETTER
"    1/31/2023         RE: Crispin Rojas  [address] [facility] [address]        Dear Colleague,    Thank you for referring your patient, Crispin Rojas, to the [facility] PODIATRY. Please see a copy of my visit note below.    Podiatry / Foot and Ankle Surgery Progress Note    January 31, 2023    Subject: Patient was seen for 2-week status post right third metatarsal head excision with Dr. Soto.  Notes he was concerned about the darkening of the right fourth toe that started a few days ago.  Denies fever, nausea, vomiting.  No injury.  No pain but has baseline neuropathy.    Objective:  Vitals: /72   Ht 1.803 m (5' 11\")   Wt 104.3 kg (230 lb)   BMI 32.08 kg/m      A1C: 5.6 (11/29/2022)    General:  Patient is alert and orientated.  NAD.    Vascular:  DP and PT pulses are palpable.  No edema or varicosities noted.  CFT's < 3secs.  Skin temp is normal.    Neuro:  Light and gross touch sensation absent toe feet.    Derm: Superficial eschar noted over the dorsal aspect of the right fourth toe.  Appears to have new skin underneath.  Sutures are in tact to the dorsal aspect of the right foot.  Also intact to the plantar aspect of the right foot through the ulceration.  Full-thickness ulcer to the plantar aspect of the right third metatarsal head area measures approximately 3-1/2 cm x 1-1/2 cm x 0.3 cm.  Base of the wound is granular.  No surrounding erythema purulent drainage or malodor noted.    Musculoskeletal:  Right great toe and 4th toes amputated.     Assessment:    Diabetic polyneuropathy associated with type 2 diabetes mellitus (H)  Post-operative state  Amputation of right great toe (H)  Ulcer of right foot with fat layer exposed (H)      Medical Decision Making/Plan: At this time the dressing was changed.  The eschar appears to be very superficial with new skin underneath.  We will have him continue to keep the foot and dressing dry.  We will have him continue to " monitor this.  Did not remove the stitches today.  He has a follow-up with Dr. Soto next week.  All questions were answered to patient's satisfaction and he will call further questions or concerns.      Patient Risk Factor:  Patient is a medium risk factor for infection.     Rachelle Manriquez DPM, Podiatry/Foot and Ankle Surgery                   Again, thank you for allowing me to participate in the care of your patient.        Sincerely,        Rachelle Manriquez DPM, Podiatry/Foot and Ankle Surgery

## 2023-01-31 NOTE — PATIENT INSTRUCTIONS
Thank you for choosing [facility] Podiatry / Foot & Ankle Surgery!    DR GARCIA'S CLINIC:  [facility]   [address]   [place] [address]      TRIAGE LINE: [phone]  APPOINTMENTS: [phone]  RADIOLOGY: [phone]  SET UP SURGERY: [phone]  PHYSICAL THERAPY: [phone]   FAX NUMBER: [phone]  BILLING QUESTIONS: [phone]           Follow up:

## 2023-02-02 ENCOUNTER — DOCUMENTATION ONLY (OUTPATIENT)
Dept: HOME HEALTH SERVICES | Facility: CLINIC | Age: 63
End: 2023-02-02

## 2023-02-02 ENCOUNTER — LAB REQUISITION (OUTPATIENT)
Dept: LAB | Facility: CLINIC | Age: 63
End: 2023-02-02
Payer: COMMERCIAL

## 2023-02-02 DIAGNOSIS — E11.42 DIABETIC POLYNEUROPATHY ASSOCIATED WITH TYPE 2 DIABETES MELLITUS (H): Primary | ICD-10-CM

## 2023-02-02 DIAGNOSIS — Z98.890 POSTOPERATIVE STATE: ICD-10-CM

## 2023-02-02 DIAGNOSIS — L97.512 NON-PRESSURE CHRONIC ULCER OF OTHER PART OF RIGHT FOOT WITH FAT LAYER EXPOSED (H): ICD-10-CM

## 2023-02-02 LAB
AST SERPL W P-5'-P-CCNC: 21 U/L (ref 10–50)
BASOPHILS # BLD AUTO: 0.1 10E3/UL (ref 0–0.2)
BASOPHILS NFR BLD AUTO: 1 %
BUN SERPL-MCNC: 16.7 MG/DL (ref 8–23)
CREAT SERPL-MCNC: 1.01 MG/DL (ref 0.67–1.17)
CRP SERPL-MCNC: 7.03 MG/L
EOSINOPHIL # BLD AUTO: 0.3 10E3/UL (ref 0–0.7)
EOSINOPHIL NFR BLD AUTO: 4 %
ERYTHROCYTE [DISTWIDTH] IN BLOOD BY AUTOMATED COUNT: 13.6 % (ref 10–15)
ERYTHROCYTE [SEDIMENTATION RATE] IN BLOOD BY WESTERGREN METHOD: 44 MM/HR (ref 0–20)
GFR SERPL CREATININE-BSD FRML MDRD: 85 ML/MIN/1.73M2
HCT VFR BLD AUTO: 37.9 % (ref 40–53)
HGB BLD-MCNC: 11.7 G/DL (ref 13.3–17.7)
HOLD SPECIMEN: NORMAL
IMM GRANULOCYTES # BLD: 0 10E3/UL
IMM GRANULOCYTES NFR BLD: 1 %
LYMPHOCYTES # BLD AUTO: 2.1 10E3/UL (ref 0.8–5.3)
LYMPHOCYTES NFR BLD AUTO: 33 %
MCH RBC QN AUTO: 26.5 PG (ref 26.5–33)
MCHC RBC AUTO-ENTMCNC: 30.9 G/DL (ref 31.5–36.5)
MCV RBC AUTO: 86 FL (ref 78–100)
MONOCYTES # BLD AUTO: 0.3 10E3/UL (ref 0–1.3)
MONOCYTES NFR BLD AUTO: 5 %
NEUTROPHILS # BLD AUTO: 3.6 10E3/UL (ref 1.6–8.3)
NEUTROPHILS NFR BLD AUTO: 56 %
NRBC # BLD AUTO: 0 10E3/UL
NRBC BLD AUTO-RTO: 0 /100
PLATELET # BLD AUTO: 262 10E3/UL (ref 150–450)
RBC # BLD AUTO: 4.42 10E6/UL (ref 4.4–5.9)
WBC # BLD AUTO: 6.4 10E3/UL (ref 4–11)

## 2023-02-02 PROCEDURE — 85025 COMPLETE CBC W/AUTO DIFF WBC: CPT | Performed by: INTERNAL MEDICINE

## 2023-02-02 PROCEDURE — 84520 ASSAY OF UREA NITROGEN: CPT | Performed by: INTERNAL MEDICINE

## 2023-02-02 PROCEDURE — 84450 TRANSFERASE (AST) (SGOT): CPT | Performed by: INTERNAL MEDICINE

## 2023-02-02 PROCEDURE — 86140 C-REACTIVE PROTEIN: CPT | Performed by: INTERNAL MEDICINE

## 2023-02-02 PROCEDURE — 85652 RBC SED RATE AUTOMATED: CPT | Performed by: INTERNAL MEDICINE

## 2023-02-02 PROCEDURE — 82565 ASSAY OF CREATININE: CPT | Performed by: INTERNAL MEDICINE

## 2023-02-08 ENCOUNTER — TELEPHONE (OUTPATIENT)
Dept: PODIATRY | Facility: CLINIC | Age: 63
End: 2023-02-08

## 2023-02-08 ENCOUNTER — OFFICE VISIT (OUTPATIENT)
Dept: PODIATRY | Facility: CLINIC | Age: 63
End: 2023-02-08
Payer: COMMERCIAL

## 2023-02-08 VITALS — DIASTOLIC BLOOD PRESSURE: 68 MMHG | BODY MASS INDEX: 32.08 KG/M2 | WEIGHT: 230 LBS | SYSTOLIC BLOOD PRESSURE: 122 MMHG

## 2023-02-08 DIAGNOSIS — E11.42 DIABETIC POLYNEUROPATHY ASSOCIATED WITH TYPE 2 DIABETES MELLITUS (H): ICD-10-CM

## 2023-02-08 DIAGNOSIS — Z89.411 STATUS POST AMPUTATION OF RIGHT GREAT TOE (H): Primary | ICD-10-CM

## 2023-02-08 DIAGNOSIS — L97.512 ULCER OF RIGHT FOOT WITH FAT LAYER EXPOSED (H): ICD-10-CM

## 2023-02-08 DIAGNOSIS — L84 PRE-ULCERATIVE CORN OR CALLOUS: ICD-10-CM

## 2023-02-08 DIAGNOSIS — Z98.890 POST-OPERATIVE STATE: ICD-10-CM

## 2023-02-08 PROCEDURE — 11042 DBRDMT SUBQ TIS 1ST 20SQCM/<: CPT | Mod: 58 | Performed by: PODIATRIST

## 2023-02-08 PROCEDURE — 2894A VOIDCORRECT: CPT | Mod: 25 | Performed by: PODIATRIST

## 2023-02-08 PROCEDURE — 97602 WOUND(S) CARE NON-SELECTIVE: CPT | Mod: 51 | Performed by: PODIATRIST

## 2023-02-08 NOTE — LETTER
"    2/8/2023         RE: Crispin Rojas  [address] [facility] [address]        Dear Colleague,    Thank you for referring your patient, Crispin Rojas, to the [facility]. Please see a copy of my visit note below.    [place] PODIAT[facility]/FOOT & ANKLE SURGERY  CLINIC NOTE    CHIEF COMPLAINT:  right foot    PATIENT HISTORY:  Crispin Rojas is a 62 year old male who follows up for right foot. Had third metatarsal head excision and fourth metatarsal osteotomy on 1/19/23. Is now three weeks out and states drainage has decreased. Is on IV abx, managed by ID due to cultures showing pseudomonas resistant to all oral options.   -He states site seems to be draining less. He saw Dr. Manriquez last week because his \"fourth toe was black.\" States that's now resolved. No pain. Denies N/F/V/C/D. Saw ID yesterday     Review of Systems:  A 10 point review of systems was performed and is positive for that noted above in the patient history.  All other areas are negative.     PAST MEDICAL HISTORY:   Past Medical History:   Diagnosis Date     Ascending aorta dilatation (H)      Cerebral infarction (H)     2010     Gastroesophageal reflux disease with esophagitis      H/O blood clots 2022     Hyperlipidemia LDL goal <70      Hypertension      Nonalcoholic steatohepatitis 11/30/2022     Numbness and tingling      Obesity      GILA (obstructive sleep apnea) 10/22/2018    CPAP intolerant     PVD (peripheral vascular disease) (H)     s/p left partial toe amputation     Renal stone      Tremor      Type 2 diabetes mellitus with diabetic polyneuropathy, with long-term current use of insulin (H)         PAST SURGICAL HISTORY:   Past Surgical History:   Procedure Laterality Date     AMPUTATE FOOT Left 8/18/2016    Procedure: AMPUTATE FOOT;  Surgeon: Jack Younger DPM;  Location: [facility]     AMPUTATE TOE(S) Right 2/1/2016    Procedure: AMPUTATE TOE(S);  Surgeon: Rachelle Manriquez DPM, Pod;  Location: [facility] OR "     AMPUTATE TOE(S) Right 8/2/2019    Procedure: Right fourth toe partial amputation for treatment of osteomyelitis.;  Surgeon: Jack Younger DPM;  [facility]     AMPUTATE TOE(S) Left 11/8/2019    Procedure: Left second toe amputation at the metatarsophalangeal joint.;  Surgeon: Rachelle Manriquez DPM, Podiatry/Foot and Ankle Surgery;  Location: [facility]     AMPUTATE TOE(S) Right 6/5/2022    Procedure: AMPUTATION OF RIGHT GREAT TOE;  Surgeon: Wili Quiles DPM;  [facility]     AMPUTATE TOE(S) Right 7/28/2022    Procedure: Right foot partial first metatarsal resection;  Surgeon: Tiny Soto DPM;  Location: [facility]     ANGIOGRAM       BIOPSY BONE FOOT Right 7/24/2022    Procedure: Bone biopsy, right first metatarsal.;  Surgeon: Valentino Molina DPM;  Location: [facility]     COLONOSCOPY       COMBINED CYSTOSCOPY, RETROGRADES, URETEROSCOPY, INSERT STENT Left 8/21/2016    Procedure: COMBINED CYSTOSCOPY, RETROGRADES, URETEROSCOPY, INSERT STENT;  Surgeon: Artemio Valenzuela MD;  [facility]     COMBINED CYSTOSCOPY, RETROGRADES, URETEROSCOPY, LASER HOLMIUM LITHOTRIPSY URETER(S), INSERT STENT Left 10/3/2016    Procedure: COMBINED CYSTOSCOPY, RETROGRADES, URETEROSCOPY, LASER HOLMIUM LITHOTRIPSY URETER(S), INSERT STENT;  Surgeon: Artemio Valenzuela MD;  [facility]     EXTRACORPOREAL SHOCK WAVE LITHOTRIPSY (ESWL) Left 9/8/2016    Procedure: EXTRACORPOREAL SHOCK WAVE LITHOTRIPSY (ESWL);  Surgeon: Artemio Valenzuela MD;  Location: [facility]     INCISION AND DRAINAGE FOOT, COMBINED Right 7/24/2022    Procedure: Incision and drainage, right foot ;  Surgeon: Valentino Molina DPM;  [facility]     OSTEOTOMY FOOT Right 11/30/2022    Procedure: 1. Right second metatarsal floating osteotomy  2. Right second digit proximal phalanx arthroplasty 3. Right percutaneous flexor tenotomy;  Surgeon: Tiny Soto DPM;  [facility]     OSTEOTOMY FOOT Right 1/19/2023    Procedure: Right  foot third metatarsal head excision, ulcer debridement, matatarsal osteotomies;  Surgeon: Tiny Soto DPM;  Location: [facility]     RECESSION GASTROCNEMIUS Right 2/1/2016    Procedure: RECESSION GASTROCNEMIUS;  Surgeon: Rachelle Manriquez DPM, Pod;  [facility]        MEDICATIONS:  Reviewed in Epic.     ALLERGIES:    Allergies   Allergen Reactions     Hmg-Coa-R Inhibitors Swelling     Other reaction(s): Other (see comments)  SIMCOR caused edema in extremities       Bactrim [Sulfamethoxazole W/Trimethoprim] Nausea and Vomiting     Sulfa Drugs Nausea     Niacin Swelling     SIMCOR caused edema in extremities     Simvastatin Swelling     SIMCOR caused edema in extremities        SOCIAL HISTORY:   Social History     Socioeconomic History     Marital status:      Spouse name: Sydney     Number of children: 2     Years of education: Not on file     Highest education level: Master's degree (e.g., MA, MS, Arleth, MEd, MSW, ELIANA)   Occupational History     Occupation: marketing     Employer: [employer]     Comment: [employer]   Tobacco Use     Smoking status: Never     Smokeless tobacco: Never   Vaping Use     Vaping Use: Never used   Substance and Sexual Activity     Alcohol use: Yes     Comment: rare---red wine 3x per month     Drug use: Never     Sexual activity: Not Currently     Partners: Female   Other Topics Concern     Parent/sibling w/ CABG, MI or angioplasty before 65F 55M? No   Social History Narrative     Not on file     Social Determinants of Health     Financial Resource Strain: Low Risk      Difficulty of Paying Living Expenses: Not very hard   Food Insecurity: No Food Insecurity     Worried About Running Out of Food in the Last Year: Never true     Ran Out of Food in the Last Year: Never true   Transportation Needs: No Transportation Needs     Lack of Transportation (Medical): No     Lack of Transportation (Non-Medical): No   Physical Activity: Sufficiently Active     Days of Exercise per Week: 5  days     Minutes of Exercise per Session: 50 min   Stress: No Stress Concern Present     Feeling of Stress : Not at all   Social Connections: Moderately Integrated     Frequency of Communication with Friends and Family: Twice a week     Frequency of Social Gatherings with Friends and Family: Never     Attends Bahai Services: More than 4 times per year     Active Member of Clubs or Organizations: Yes     Attends Club or Organization Meetings: Not on file     Marital Status:    Intimate Partner Violence: Not on file   Housing Stability: Low Risk      Unable to Pay for Housing in the Last Year: No     Number of Places Lived in the Last Year: 2     Unstable Housing in the Last Year: No        FAMILY HISTORY:   Family History   Problem Relation Age of Onset     Arthritis Mother         SLE     Connective Tissue Disorder Mother         lupus     Diabetes Mother      Cerebrovascular Disease Mother      Cancer Mother      Diabetes Father      Coronary Artery Disease Father      Chronic Obstructive Pulmonary Disease Father      Diabetes Sister      Diabetes Maternal Grandfather         EXAM:Vitals: /68   Wt 104.3 kg (230 lb)   BMI 32.08 kg/m    BMI= Body mass index is 32.08 kg/m .      General appearance: Patient is alert and fully cooperative with history & exam.  No sign of distress is noted during the visit.      Respiratory: Breathing is regular & unlabored while sitting.      HEENT: Hearing is intact to spoken word.  Speech is clear.  No gross evidence of visual impairment that would impact ambulation.      Dermatologic:  Right foot: submet three ulcer measures 2.2 x 1.5 x .2 cm. Smaller in size. Granular. No probe to bone. No cellulitis. Dorsal fourth digit, no longer black. Well perfused. incisoin site with some sloughy tissue.     Left foot submet 3 callus/preuclerative lesion, debrided to level of dermis. No open lesions or cellulitis. Preulcerative      Vascular: Dorsalis pedis and posterior  tibial pulses are intact & regular bilaterally.  CFT and skin temperature is normal to both lower extremities.       Neurologic: Lower extremity sensation is diminished, bilateral foot, to light touch.  No evidence of neurological-based weakness or contracture in the lower extremities.       Musculoskeletal: Patient is ambulatory without an assistive device or brace.  S/p right first ray resection. Right 2nd digit --> now more rectus.   S/p left hallux and second digit amputations     Psychiatric: Affect is pleasant & appropriate.      Cultures:     Foot, Right; Tissue    0 Result Notes  Culture 2+ Pseudomonas aeruginosa Abnormal        Isolated in broth only Enterococcus faecalis Abnormal     On day 1 of incubation   1+ Normal josé            Resulting Agency: [facility]     Susceptibility     Pseudomonas aeruginosa Enterococcus faecalis     JEFFREY JEFFREY     Amikacin <=2 ug/mL Susceptible       Ampicillin   <=2 ug/mL Susceptible     Cefepime <=1 ug/mL Susceptible       Ceftazidime <=1 ug/mL Susceptible       Ciprofloxacin 2 ug/mL Resistant       Gentamicin <=1 ug/mL Susceptible       Gentamicin Synergy   Susceptible... Susceptible 1     Levofloxacin >=8 ug/mL Resistant       Meropenem 1 ug/mL Susceptible       Penicillin   2 ug/mL Susceptible     Piperacillin/Tazobactam <=4 ug/mL Susceptible       Tobramycin <=1 ug/mL Susceptible       Vancomycin   1 ug/mL Susceptible                 Foot, Right; Tissue    0 Result Notes  Culture 4+ Bacteroides fragilis Abnormal     Susceptibilities not routinely done   2+ Anaerococcus species Abnormal     Susceptibilities not routinely done        Pathology:     Final Diagnosis   Right third toe, metatarsal head, amputation-  -Bone with changes consistent with extensive acute osteomyelitis.       PROCEDURE:   Verbal consent was obtained for debridement. A 15 blade was used to excise the nonivable tissue to the ulcer, down to and including subcutaneous tissue. No noted purulence afterwards.  Debrided site measures 2.2 cm x 1.5 x .2 cm. No probe to bone. Well tolerated. Site was cleaned with alcohol.  (right foot)     Verbal consent was obtained for debridement. A 15 blade was used to excise the nonivable tissue to the ulcer, down to and including dermis. No noted purulence afterwards. Debrided site measures 2.2 cm x 2 x .1 cm. No probe to bone. Well tolerated. Site was cleaned with alcohol.  (left foot)         ASSESSMENT:  1. S/p right foot third head resection, fourth metatarsal floating osteotomy, ulcer debridement  --> being treated for residual osteomyelitis   2. Left foot submet three preulcerative lesion     MEDICAL DECISION MAKING:   -Patient seen for follow up. Overall improved. Ulcer is draining less and is smaller. Discussed with him it's difficult to tell at this point if there's significant osteomyelitis still present. Being treated with IV abx. Clinically site doesn't appear to.   -Labs reviewed from ID showing decreasing inflammatory markers   -Sutures all removed.   -Debrided ulcer on right foot. Debrided preulcerative lesion on left foot   -Discussed continued close monitoring and dressing changes.   -Orthotics rx faxed to [facility] per patient's request   -All questions answered    -F/u in 2 weeks.       Tiny Soto DPM   [place] Department of Podiatry/Foot & Ankle Surgery                  Again, thank you for allowing me to participate in the care of your patient.        Sincerely,        Tiny Soto DPM

## 2023-02-08 NOTE — TELEPHONE ENCOUNTER
Reason for Call:  Form, our goal is to have forms completed with 7 days, however, some forms may require a visit or additional information.    Type of letter, form or note:  DME order     Who is the form from?: Provider      Desired completion date of form: 2/8/2023    How will form be returned?:  fax to [phone]     Has the patient signed a consent form for release of information (may be included with form)? Not Applicable

## 2023-02-08 NOTE — PROGRESS NOTES
"[place] PODIAT[facility]/FOOT & ANKLE SURGERY  CLINIC NOTE    CHIEF COMPLAINT:  right foot    PATIENT HISTORY:  Crispin Rojas is a 62 year old male who follows up for right foot. Had third metatarsal head excision and fourth metatarsal osteotomy on 1/19/23. Is now three weeks out and states drainage has decreased. Is on IV abx, managed by ID due to cultures showing pseudomonas resistant to all oral options.   -He states site seems to be draining less. He saw Dr. Manriquez last week because his \"fourth toe was black.\" States that's now resolved. No pain. Denies N/F/V/C/D. Saw ID yesterday     Review of Systems:  A 10 point review of systems was performed and is positive for that noted above in the patient history.  All other areas are negative.     PAST MEDICAL HISTORY:   Past Medical History:   Diagnosis Date     Ascending aorta dilatation (H)      Cerebral infarction (H)     2010     Gastroesophageal reflux disease with esophagitis      H/O blood clots 2022     Hyperlipidemia LDL goal <70      Hypertension      Nonalcoholic steatohepatitis 11/30/2022     Numbness and tingling      Obesity      GILA (obstructive sleep apnea) 10/22/2018    CPAP intolerant     PVD (peripheral vascular disease) (H)     s/p left partial toe amputation     Renal stone      Tremor      Type 2 diabetes mellitus with diabetic polyneuropathy, with long-term current use of insulin (H)         PAST SURGICAL HISTORY:   Past Surgical History:   Procedure Laterality Date     AMPUTATE FOOT Left 8/18/2016    Procedure: AMPUTATE FOOT;  Surgeon: Jack Younger DPM;  [facility]     AMPUTATE TOE(S) Right 2/1/2016    Procedure: AMPUTATE TOE(S);  Surgeon: Rachelle Manriquez DPM, Pod;  [facility]: [facility]     AMPUTATE TOE(S) Right 8/2/2019    Procedure: Right fourth toe partial amputation for treatment of osteomyelitis.;  Surgeon: Jack Younger DPM;  [facility]: [facility]     AMPUTATE TOE(S) Left 11/8/2019    Procedure: Left second toe amputation at the " metatarsophalangeal joint.;  Surgeon: Rachelle Manriquez DPM, Podiatry/Foot and Ankle Surgery;  Location: [facility]     AMPUTATE TOE(S) Right 6/5/2022    Procedure: AMPUTATION OF RIGHT GREAT TOE;  Surgeon: Wili Quiles DPM;  [facility]: [facility]     AMPUTATE TOE(S) Right 7/28/2022    Procedure: Right foot partial first metatarsal resection;  Surgeon: Tiny Soto DPM;  Location: [facility]     ANGIOGRAM       BIOPSY BONE FOOT Right 7/24/2022    Procedure: Bone biopsy, right first metatarsal.;  Surgeon: Valentino Molina DPM;  [facility]: [facility]     COLONOSCOPY       COMBINED CYSTOSCOPY, RETROGRADES, URETEROSCOPY, INSERT STENT Left 8/21/2016    Procedure: COMBINED CYSTOSCOPY, RETROGRADES, URETEROSCOPY, INSERT STENT;  Surgeon: Artemio Valenzuela MD;  [facility]     COMBINED CYSTOSCOPY, RETROGRADES, URETEROSCOPY, LASER HOLMIUM LITHOTRIPSY URETER(S), INSERT STENT Left 10/3/2016    Procedure: COMBINED CYSTOSCOPY, RETROGRADES, URETEROSCOPY, LASER HOLMIUM LITHOTRIPSY URETER(S), INSERT STENT;  Surgeon: Artemio Valenzuela MD;  [facility]     EXTRACORPOREAL SHOCK WAVE LITHOTRIPSY (ESWL) Left 9/8/2016    Procedure: EXTRACORPOREAL SHOCK WAVE LITHOTRIPSY (ESWL);  Surgeon: Artemio Valenzuela MD;  [facility]     INCISION AND DRAINAGE FOOT, COMBINED Right 7/24/2022    Procedure: Incision and drainage, right foot ;  Surgeon: Valentino Molina DPM;  [facility]     OSTEOTOMY FOOT Right 11/30/2022    Procedure: 1. Right second metatarsal floating osteotomy  2. Right second digit proximal phalanx arthroplasty 3. Right percutaneous flexor tenotomy;  Surgeon: Tiny Soto DPM;  [facility]     OSTEOTOMY FOOT Right 1/19/2023    Procedure: Right foot third metatarsal head excision, ulcer debridement, matatarsal osteotomies;  Surgeon: Tiny Soto DPM;  [facility]: [facility]     RECESSION GASTROCNEMIUS Right 2/1/2016    Procedure: RECESSION GASTROCNEMIUS;  Surgeon: Rachelle Manriquez DPM, Pod;   [facility]        MEDICATIONS:  Reviewed in Epic.     ALLERGIES:    Allergies   Allergen Reactions     Hmg-Coa-R Inhibitors Swelling     Other reaction(s): Other (see comments)  SIMCOR caused edema in extremities       Bactrim [Sulfamethoxazole W/Trimethoprim] Nausea and Vomiting     Sulfa Drugs Nausea     Niacin Swelling     SIMCOR caused edema in extremities     Simvastatin Swelling     SIMCOR caused edema in extremities        SOCIAL HISTORY:   Social History     Socioeconomic History     Marital status:      Spouse name: Sydney     Number of children: 2     Years of education: Not on file     Highest education level: Master's degree (e.g., MA, MS, Arleth, MEd, MSW, ELIANA)   Occupational History     Occupation: marketing     Employer: [employer]     Comment: [employer]   Tobacco Use     Smoking status: Never     Smokeless tobacco: Never   Vaping Use     Vaping Use: Never used   Substance and Sexual Activity     Alcohol use: Yes     Comment: rare---red wine 3x per month     Drug use: Never     Sexual activity: Not Currently     Partners: Female   Other Topics Concern     Parent/sibling w/ CABG, MI or angioplasty before 65F 55M? No   Social History Narrative     Not on file     Social Determinants of Health     Financial Resource Strain: Low Risk      Difficulty of Paying Living Expenses: Not very hard   Food Insecurity: No Food Insecurity     Worried About Running Out of Food in the Last Year: Never true     Ran Out of Food in the Last Year: Never true   Transportation Needs: No Transportation Needs     Lack of Transportation (Medical): No     Lack of Transportation (Non-Medical): No   Physical Activity: Sufficiently Active     Days of Exercise per Week: 5 days     Minutes of Exercise per Session: 50 min   Stress: No Stress Concern Present     Feeling of Stress : Not at all   Social Connections: Moderately Integrated     Frequency of Communication with Friends and Family: Twice a week     Frequency of  Social Gatherings with Friends and Family: Never     Attends Anabaptism Services: More than 4 times per year     Active Member of Clubs or Organizations: Yes     Attends Club or Organization Meetings: Not on file     Marital Status:    Intimate Partner Violence: Not on file   Housing Stability: Low Risk      Unable to Pay for Housing in the Last Year: No     Number of Places Lived in the Last Year: 2     Unstable Housing in the Last Year: No        FAMILY HISTORY:   Family History   Problem Relation Age of Onset     Arthritis Mother         SLE     Connective Tissue Disorder Mother         lupus     Diabetes Mother      Cerebrovascular Disease Mother      Cancer Mother      Diabetes Father      Coronary Artery Disease Father      Chronic Obstructive Pulmonary Disease Father      Diabetes Sister      Diabetes Maternal Grandfather         EXAM:Vitals: /68   Wt 104.3 kg (230 lb)   BMI 32.08 kg/m    BMI= Body mass index is 32.08 kg/m .      General appearance: Patient is alert and fully cooperative with history & exam.  No sign of distress is noted during the visit.      Respiratory: Breathing is regular & unlabored while sitting.      HEENT: Hearing is intact to spoken word.  Speech is clear.  No gross evidence of visual impairment that would impact ambulation.      Dermatologic:  Right foot: submet three ulcer measures 2.2 x 1.5 x .2 cm. Smaller in size. Granular. No probe to bone. No cellulitis. Dorsal fourth digit, no longer black. Well perfused. incisoin site with some sloughy tissue.     Left foot submet 3 callus/preuclerative lesion, debrided to level of dermis. No open lesions or cellulitis. Preulcerative      Vascular: Dorsalis pedis and posterior tibial pulses are intact & regular bilaterally.  CFT and skin temperature is normal to both lower extremities.       Neurologic: Lower extremity sensation is diminished, bilateral foot, to light touch.  No evidence of neurological-based weakness or  contracture in the lower extremities.       Musculoskeletal: Patient is ambulatory without an assistive device or brace.  S/p right first ray resection. Right 2nd digit --> now more rectus.   S/p left hallux and second digit amputations     Psychiatric: Affect is pleasant & appropriate.      Cultures:     Foot, Right; Tissue    0 Result Notes  Culture 2+ Pseudomonas aeruginosa Abnormal        Isolated in broth only Enterococcus faecalis Abnormal     On day 1 of incubation   1+ Normal josé            Resulting Agency: [facility]     Susceptibility     Pseudomonas aeruginosa Enterococcus faecalis     JEFFREY JEFFREY     Amikacin <=2 ug/mL Susceptible       Ampicillin   <=2 ug/mL Susceptible     Cefepime <=1 ug/mL Susceptible       Ceftazidime <=1 ug/mL Susceptible       Ciprofloxacin 2 ug/mL Resistant       Gentamicin <=1 ug/mL Susceptible       Gentamicin Synergy   Susceptible... Susceptible 1     Levofloxacin >=8 ug/mL Resistant       Meropenem 1 ug/mL Susceptible       Penicillin   2 ug/mL Susceptible     Piperacillin/Tazobactam <=4 ug/mL Susceptible       Tobramycin <=1 ug/mL Susceptible       Vancomycin   1 ug/mL Susceptible                 Foot, Right; Tissue    0 Result Notes  Culture 4+ Bacteroides fragilis Abnormal     Susceptibilities not routinely done   2+ Anaerococcus species Abnormal     Susceptibilities not routinely done        Pathology:     Final Diagnosis   Right third toe, metatarsal head, amputation-  -Bone with changes consistent with extensive acute osteomyelitis.       PROCEDURE:   Verbal consent was obtained for debridement. A 15 blade was used to excise the nonivable tissue to the ulcer, down to and including subcutaneous tissue. No noted purulence afterwards. Debrided site measures 2.2 cm x 1.5 x .2 cm. No probe to bone. Well tolerated. Site was cleaned with alcohol.  (right foot)     Verbal consent was obtained for debridement. A 15 blade was used to excise the nonivable tissue to the ulcer, down to and  including dermis. No noted purulence afterwards. Debrided site measures 2.2 cm x 2 x .1 cm. No probe to bone. Well tolerated. Site was cleaned with alcohol.  (left foot)         ASSESSMENT:  1. S/p right foot third head resection, fourth metatarsal floating osteotomy, ulcer debridement  --> being treated for residual osteomyelitis   2. Left foot submet three preulcerative lesion     MEDICAL DECISION MAKING:   -Patient seen for follow up. Overall improved. Ulcer is draining less and is smaller. Discussed with him it's difficult to tell at this point if there's significant osteomyelitis still present. Being treated with IV abx. Clinically site doesn't appear to.   -Labs reviewed from ID showing decreasing inflammatory markers   -Sutures all removed.   -Debrided ulcer on right foot. Debrided preulcerative lesion on left foot   -Discussed continued close monitoring and dressing changes.   -[facility] per [facility]'s request   -All questions answered    -F/u in 2 weeks.       Tiny Soto DPM   [place] Department of Podiatry/Foot & Ankle Surgery

## 2023-02-09 ENCOUNTER — OFFICE VISIT (OUTPATIENT)
Dept: CARDIOLOGY | Facility: CLINIC | Age: 63
End: 2023-02-09
Payer: COMMERCIAL

## 2023-02-09 ENCOUNTER — LAB (OUTPATIENT)
Dept: LAB | Facility: CLINIC | Age: 63
End: 2023-02-09
Payer: COMMERCIAL

## 2023-02-09 VITALS
HEIGHT: 71 IN | WEIGHT: 226 LBS | BODY MASS INDEX: 31.64 KG/M2 | DIASTOLIC BLOOD PRESSURE: 78 MMHG | SYSTOLIC BLOOD PRESSURE: 126 MMHG | HEART RATE: 61 BPM | OXYGEN SATURATION: 98 %

## 2023-02-09 DIAGNOSIS — I10 BENIGN ESSENTIAL HYPERTENSION: Primary | ICD-10-CM

## 2023-02-09 DIAGNOSIS — M86.171 ACUTE OSTEOMYELITIS OF RIGHT ANKLE OR FOOT (H): Primary | ICD-10-CM

## 2023-02-09 DIAGNOSIS — I77.810 MILD ASCENDING AORTA DILATATION (H): ICD-10-CM

## 2023-02-09 DIAGNOSIS — E78.5 HYPERLIPIDEMIA LDL GOAL <70: ICD-10-CM

## 2023-02-09 DIAGNOSIS — E11.42 TYPE 2 DIABETES MELLITUS WITH PERIPHERAL NEUROPATHY (H): ICD-10-CM

## 2023-02-09 LAB
AST SERPL W P-5'-P-CCNC: 20 U/L (ref 10–50)
BASOPHILS # BLD AUTO: 0.1 10E3/UL (ref 0–0.2)
BASOPHILS NFR BLD AUTO: 1 %
CREAT SERPL-MCNC: 0.92 MG/DL (ref 0.67–1.17)
CRP SERPL-MCNC: 4.34 MG/L
EOSINOPHIL # BLD AUTO: 0.2 10E3/UL (ref 0–0.7)
EOSINOPHIL NFR BLD AUTO: 4 %
ERYTHROCYTE [DISTWIDTH] IN BLOOD BY AUTOMATED COUNT: 13.9 % (ref 10–15)
ERYTHROCYTE [SEDIMENTATION RATE] IN BLOOD BY WESTERGREN METHOD: 33 MM/HR (ref 0–20)
GFR SERPL CREATININE-BSD FRML MDRD: >90 ML/MIN/1.73M2
HCT VFR BLD AUTO: 39.9 % (ref 40–53)
HGB BLD-MCNC: 12.6 G/DL (ref 13.3–17.7)
IMM GRANULOCYTES # BLD: 0 10E3/UL
IMM GRANULOCYTES NFR BLD: 1 %
LYMPHOCYTES # BLD AUTO: 1.9 10E3/UL (ref 0.8–5.3)
LYMPHOCYTES NFR BLD AUTO: 32 %
MCH RBC QN AUTO: 27.2 PG (ref 26.5–33)
MCHC RBC AUTO-ENTMCNC: 31.6 G/DL (ref 31.5–36.5)
MCV RBC AUTO: 86 FL (ref 78–100)
MONOCYTES # BLD AUTO: 0.3 10E3/UL (ref 0–1.3)
MONOCYTES NFR BLD AUTO: 6 %
NEUTROPHILS # BLD AUTO: 3.4 10E3/UL (ref 1.6–8.3)
NEUTROPHILS NFR BLD AUTO: 56 %
NRBC # BLD AUTO: 0 10E3/UL
NRBC BLD AUTO-RTO: 0 /100
PLATELET # BLD AUTO: 199 10E3/UL (ref 150–450)
RBC # BLD AUTO: 4.63 10E6/UL (ref 4.4–5.9)
WBC # BLD AUTO: 5.9 10E3/UL (ref 4–11)

## 2023-02-09 PROCEDURE — 99214 OFFICE O/P EST MOD 30 MIN: CPT | Performed by: INTERNAL MEDICINE

## 2023-02-09 PROCEDURE — 85004 AUTOMATED DIFF WBC COUNT: CPT

## 2023-02-09 PROCEDURE — 82565 ASSAY OF CREATININE: CPT

## 2023-02-09 PROCEDURE — 84450 TRANSFERASE (AST) (SGOT): CPT

## 2023-02-09 PROCEDURE — 36415 COLL VENOUS BLD VENIPUNCTURE: CPT

## 2023-02-09 PROCEDURE — 85652 RBC SED RATE AUTOMATED: CPT

## 2023-02-09 PROCEDURE — 86140 C-REACTIVE PROTEIN: CPT

## 2023-02-09 RX ORDER — HYDROCHLOROTHIAZIDE 12.5 MG/1
12.5 CAPSULE ORAL EVERY MORNING
Qty: 100 CAPSULE | Refills: 5 | Status: SHIPPED | OUTPATIENT
Start: 2023-02-09 | End: 2023-07-10

## 2023-02-09 RX ORDER — AMLODIPINE BESYLATE 5 MG/1
5 TABLET ORAL 2 TIMES DAILY
Qty: 200 TABLET | Refills: 5 | Status: SHIPPED | OUTPATIENT
Start: 2023-02-09 | End: 2024-03-15

## 2023-02-09 RX ORDER — ATORVASTATIN CALCIUM 40 MG/1
40 TABLET, FILM COATED ORAL DAILY
Qty: 100 TABLET | Refills: 5 | Status: SHIPPED | OUTPATIENT
Start: 2023-02-09 | End: 2023-07-10

## 2023-02-09 RX ORDER — LISINOPRIL 40 MG/1
40 TABLET ORAL DAILY
Qty: 100 TABLET | Refills: 5 | Status: SHIPPED | OUTPATIENT
Start: 2023-02-09 | End: 2023-07-10

## 2023-02-09 NOTE — LETTER
"2/9/2023    Johann Serna DO  [address]  [facility] [address]    RE: Crispin Rojas       Dear Colleague,     I had the pleasure of seeing Crispin Rojas in the [facility].    Clinic visit note dictated. Dictation reference number - [id number]      PHYSICAL EXAMINATION:  Vitals: /78 (BP Location: Right arm, Patient Position: Sitting, Cuff Size: Adult Large)   Pulse 61   Ht 1.803 m (5' 11\")   Wt 102.5 kg (226 lb)   SpO2 98%   BMI 31.52 kg/m     minutes.      Encounter Diagnoses   Name Primary?     Benign essential hypertension      Hyperlipidemia LDL goal <70      Type 2 diabetes mellitus with peripheral neuropathy (H)      Mild ascending aorta dilatation (H)          No orders of the defined types were placed in this encounter.          CURRENT MEDICATIONS:  Current Outpatient Medications   Medication Sig Dispense Refill     amLODIPine (NORVASC) 5 MG tablet Take 1 tablet (5 mg) by mouth 2 times daily Take one tablet twice daily  SEE MD THIS QUARTER 200 tablet 5     atorvastatin (LIPITOR) 40 MG tablet Take 1 tablet (40 mg) by mouth daily Take one tablet daily SEE PROVIDER FOR NEXT REFILL 100 tablet 5     blood glucose (NO BRAND SPECIFIED) lancets standard Use to test blood sugar 3 times daily or as directed. 300 each 3     calcium carbonate (OS-NARA) 500 MG tablet Take 1 tablet by mouth 2 times daily       cephALEXin (KEFLEX) 500 MG capsule TAKE 1 CAPSULE BY MOUTH THREE TIMES DAILY X 3 MONTHS       Cholecalciferol (VITAMIN D3) 25 MCG (1000 UT) CAPS Take 1,000 Units by mouth daily        Co-Enzyme Q10 100 MG CAPS Take 100 mg by mouth daily        collagenase (SANTYL) 250 UNIT/GM external ointment Apply topically daily 30 g 1     Continuous Blood Gluc Sensor (FREESTYLE LUCERO 14 DAY SENSOR) [facility] [place] [facility] according to the [facility] USE ONE EVERY 14 DAYS 6 each 3     ELIQUIS ANTICOAGULANT 5 MG tablet Take 1 tablet (5 mg) by mouth 2 times daily 200 tablet 5     gabapentin (NEURONTIN) 100 MG capsule Take 400 mg by mouth 3 " times daily       hydrochlorothiazide (MICROZIDE) 12.5 MG capsule Take 1 capsule (12.5 mg) by mouth every morning 100 capsule 5     Insulin Aspart FlexPen 100 UNIT/ML SOPN USE DAILY WITH MEALS, USING UP TO 70 UNITS PER DAY 30 mL 0     insulin degludec (TRESIBA FLEXTOUCH) 100 UNIT/ML pen 50-55 units subcutaneous daily 45 mL 0     insulin pen needle (B-D U/F) 31G X 5 MM miscellaneous USE TO INJECT LANTUS TWICE DAILY AND NOVOLOG THREE TIMES DAILY AS DIRECTED 500 each 0     ketoconazole (NIZORAL) 2 % external shampoo APPLY TOPICALLY DAILY AS NEEDED FOR ITCHING OR IRRITATION. SEE MD AFTER 6 WEEKS 120 mL 3     lisinopril (ZESTRIL) 40 MG tablet Take 1 tablet (40 mg) by mouth daily 100 tablet 5     MAGNESIUM GLYCINATE PLUS PO Take 400 mg by mouth 2 times daily       metFORMIN (GLUCOPHAGE XR) 500 MG 24 hr tablet TAKE 2 TABLETS BY MOUTH TWICE DAILY 360 tablet 0     Multiple Vitamins-Minerals (MULTIVITAMIN PO) Take 1 tablet by mouth daily        Omega-3 Fatty Acids (OMEGA-3 FISH OIL PO) Take 1 g by mouth 2 times daily (with meals)        omeprazole (PRILOSEC) 40 MG DR capsule Take 40 mg by mouth daily       OZEMPIC, 1 MG/DOSE, 4 MG/3ML SOPN INJECT 1MG SUBCUTANEOUSLY AS DIRECTED EVERY 7 DAYS ON WEDNESDAYS 3 mL 0     primidone (MYSOLINE) 50 MG tablet Take by mouth every 12 hours           ALLERGIES:  Allergies   Allergen Reactions     Hmg-Coa-R Inhibitors Swelling     Other reaction(s): Other (see comments)  SIMCOR caused edema in extremities       Bactrim [Sulfamethoxazole W/Trimethoprim] Nausea and Vomiting     Sulfa Drugs Nausea     Niacin Swelling     SIMCOR caused edema in extremities     Simvastatin Swelling     SIMCOR caused edema in extremities       PAST MEDICAL HISTORY:    Past Medical History:   Diagnosis Date     Ascending aorta dilatation (H)      Cerebral infarction (H)     2010     Gastroesophageal reflux disease with esophagitis      H/O blood clots 2022     Hyperlipidemia LDL goal <70      Hypertension       Nonalcoholic steatohepatitis 11/30/2022     Numbness and tingling      Obesity      GILA (obstructive sleep apnea) 10/22/2018    CPAP intolerant     PVD (peripheral vascular disease) (H)     s/p left partial toe amputation     Renal stone      Tremor      Type 2 diabetes mellitus with diabetic polyneuropathy, with long-term current use of insulin (H)        PAST SURGICAL HISTORY:    Past Surgical History:   Procedure Laterality Date     AMPUTATE FOOT Left 8/18/2016    Procedure: AMPUTATE FOOT;  Surgeon: Jack Younger DPM;  [facility]: [facility]     AMPUTATE TOE(S) Right 2/1/2016    Procedure: AMPUTATE TOE(S);  Surgeon: Rachelle Manriquez DPM, Pod;  [facility]: [facility]     AMPUTATE TOE(S) Right 8/2/2019    Procedure: Right fourth toe partial amputation for treatment of osteomyelitis.;  Surgeon: Jack Younger DPM;  [facility]     AMPUTATE TOE(S) Left 11/8/2019    Procedure: Left second toe amputation at the metatarsophalangeal joint.;  Surgeon: Rachelle Manriquez DPM, Podiatry/Foot and Ankle Surgery;  [facility]: [facility]     AMPUTATE TOE(S) Right 6/5/2022    Procedure: AMPUTATION OF RIGHT GREAT TOE;  Surgeon: Wili Quiles DPM;  [facility]     AMPUTATE TOE(S) Right 7/28/2022    Procedure: Right foot partial first metatarsal resection;  Surgeon: Tiny Soto DPM;  Location: [facility]     ANGIOGRAM       BIOPSY BONE FOOT Right 7/24/2022    Procedure: Bone biopsy, right first metatarsal.;  Surgeon: Valentino Molina DPM;  Location: [facility]     COLONOSCOPY       COMBINED CYSTOSCOPY, RETROGRADES, URETEROSCOPY, INSERT STENT Left 8/21/2016    Procedure: COMBINED CYSTOSCOPY, RETROGRADES, URETEROSCOPY, INSERT STENT;  Surgeon: Artemio Valenzuela MD;  [facility]     COMBINED CYSTOSCOPY, RETROGRADES, URETEROSCOPY, LASER HOLMIUM LITHOTRIPSY URETER(S), INSERT STENT Left 10/3/2016    Procedure: COMBINED CYSTOSCOPY, RETROGRADES, URETEROSCOPY, LASER HOLMIUM LITHOTRIPSY URETER(S), INSERT STENT;  Surgeon: Nicolas  Artemio Arce MD;  [facility]: [facility]     EXTRACORPOREAL SHOCK WAVE LITHOTRIPSY (ESWL) Left 9/8/2016    Procedure: EXTRACORPOREAL SHOCK WAVE LITHOTRIPSY (ESWL);  Surgeon: Artemio Valenzuela MD;  [facility]: [facility]     INCISION AND DRAINAGE FOOT, COMBINED Right 7/24/2022    Procedure: Incision and drainage, right foot ;  Surgeon: Valentino Molina DPM;  [facility]     OSTEOTOMY FOOT Right 11/30/2022    Procedure: 1. Right second metatarsal floating osteotomy  2. Right second digit proximal phalanx arthroplasty 3. Right percutaneous flexor tenotomy;  Surgeon: Tiny Soto DPM;  [facility]     OSTEOTOMY FOOT Right 1/19/2023    Procedure: Right foot third metatarsal head excision, ulcer debridement, matatarsal osteotomies;  Surgeon: Tiny Soto DPM;  Location: [facility]     RECESSION GASTROCNEMIUS Right 2/1/2016    Procedure: RECESSION GASTROCNEMIUS;  Surgeon: Rachelle Manriquez DPM, Pod;  [facility]: [facility]       FAMILY HISTORY:    Family History   Problem Relation Age of Onset     Arthritis Mother         SLE     Connective Tissue Disorder Mother         lupus     Diabetes Mother      Cerebrovascular Disease Mother      Cancer Mother      Diabetes Father      Coronary Artery Disease Father      Chronic Obstructive Pulmonary Disease Father      Diabetes Sister      Diabetes Maternal Grandfather        SOCIAL HISTORY:    Social History     Socioeconomic History     Marital status:      Spouse name: Sydney     Number of children: 2     Years of education: None     Highest education level: Master's degree (e.g., MA, MS, Arleth, MEd, MSW, ELIANA)   Occupational History     Occupation: marketing     Employer: [employer]     Comment: [employer]   Tobacco Use     Smoking status: Never     Smokeless tobacco: Never   Vaping Use     Vaping Use: Never used   Substance and Sexual Activity     Alcohol use: Yes     Comment: rare---red wine 3x per month     Drug use: Never     Sexual activity: Not Currently      Partners: Female   Other Topics Concern     Parent/sibling w/ CABG, MI or angioplasty before 65F 55M? No     Social Determinants of Health     Financial Resource Strain: Low Risk      Difficulty of Paying Living Expenses: Not very hard   Food Insecurity: No Food Insecurity     Worried About Running Out of Food in the Last Year: Never true     Ran Out of Food in the Last Year: Never true   Transportation Needs: No Transportation Needs     Lack of Transportation (Medical): No     Lack of Transportation (Non-Medical): No   Physical Activity: Sufficiently Active     Days of Exercise per Week: 5 days     Minutes of Exercise per Session: 50 min   Stress: No Stress Concern Present     Feeling of Stress : Not at all   Social Connections: Moderately Integrated     Frequency of Communication with Friends and Family: Twice a week     Frequency of Social Gatherings with Friends and Family: Never     Attends Sikh Services: More than 4 times per year     Active Member of Clubs or Organizations: Yes     Marital Status:    Housing Stability: Low Risk      Unable to Pay for Housing in the Last Year: No     Number of Places Lived in the Last Year: 2     Unstable Housing in the Last Year: No                         Service Date: 02/09/2023    PRIMARY CARE PROVIDER:  Johann Serna DO    REASON FOR VISIT:    1.  Followup of aortic root and ascending aorta dilatation with CT angiogram results.  2.  Followup of hypertension after medication changes.    HISTORY OF PRESENT ILLNESS:  As always, it was delightful to visit with Judie, who is a younger appearing 62-year-old  male, lives with his wife.  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    His history is significant for hypertension, remote stroke in 2010 with no residual deficits, type 2 diabetes mellitus (on insulin, well controlled, complicated by neuropathy, status post left partial toe amputation), hyperlipidemia with goal LDL less  than 70, obstructive sleep apnea (CPAP intolerant), BMI 31, right bundle branch block, aortic root and ascending aorta dilatation, never tobacco user.    The following issues were addressed today:  1.  Hypertension management.  At his last visit, his blood pressure was elevated.  I made some adjustments and his blood pressure is excellent at 126/78 with pulse of 60 BPM.  He is on lisinopril 40 mg daily and hydrochlorothiazide 12.5 mg daily.  2.  Aortic root and ascending aorta dilatation.  I personally reviewed his CT angiogram that confirms his aortic root is mildly dilated at 4.1 cm.  Ascending aorta is mildly dilated at 4.3 cm.  He is 5 feet 11 inches tall.  There were no other significant incidental findings.  3.  Hyperlipidemia.  His LDL is 45, HDL 42, triglycerides 99.  On atorvastatin 40 mg daily.  4.  DVT in .  He is on Eliquis for this.  Aspirin stopped at last visit.    I personally reviewed his labs and CT angiogram as documented.    ASSESSMENT AND PLAN:  The patient has chronic stable mild aortic root and ascending aorta dilatation in the context of a long history of hypertension, type 2 diabetes.  Apart from being CPAP intolerant, other risk factors are well addressed.  His goal BP is 130/80 or below and he is at this level.    PLAN:    1.  Continue current medications.  Prescriptions renewed for a year.  2.  Follow up with me in 2 years with pre-visit transthoracic echocardiogram for monitoring his aortopathy.    Established patient.    Total time 30 minutes.    Singh Theodore MD    D: 2023   T: 2023   MT:     Name:     CEDRIC LAW  MRN:      [id number]        Account:      [id number]   :      1960           Service Date: 2023       Document: [id number]    Thank you for allowing me to participate in the care of your patient.      Sincerely,     Singh Theodore MD     [facility]  cc:   Singh  Emeli Theodore MD  [address] [place] [id number]

## 2023-02-09 NOTE — PROGRESS NOTES
"  Clinic visit note dictated. Dictation reference number - [id number]      PHYSICAL EXAMINATION:  Vitals: /78 (BP Location: Right arm, Patient Position: Sitting, Cuff Size: Adult Large)   Pulse 61   Ht 1.803 m (5' 11\")   Wt 102.5 kg (226 lb)   SpO2 98%   BMI 31.52 kg/m     minutes.      Encounter Diagnoses   Name Primary?     Benign essential hypertension      Hyperlipidemia LDL goal <70      Type 2 diabetes mellitus with peripheral neuropathy (H)      Mild ascending aorta dilatation (H)          No orders of the defined types were placed in this encounter.          CURRENT MEDICATIONS:  Current Outpatient Medications   Medication Sig Dispense Refill     amLODIPine (NORVASC) 5 MG tablet Take 1 tablet (5 mg) by mouth 2 times daily Take one tablet twice daily  SEE MD THIS QUARTER 200 tablet 5     atorvastatin (LIPITOR) 40 MG tablet Take 1 tablet (40 mg) by mouth daily Take one tablet daily SEE PROVIDER FOR NEXT REFILL 100 tablet 5     blood glucose (NO BRAND SPECIFIED) lancets standard Use to test blood sugar 3 times daily or as directed. 300 each 3     calcium carbonate (OS-NARA) 500 MG tablet Take 1 tablet by mouth 2 times daily       cephALEXin (KEFLEX) 500 MG capsule TAKE 1 CAPSULE BY MOUTH THREE TIMES DAILY X 3 MONTHS       Cholecalciferol (VITAMIN D3) 25 MCG (1000 UT) CAPS Take 1,000 Units by mouth daily        Co-Enzyme Q10 100 MG CAPS Take 100 mg by mouth daily        collagenase (SANTYL) 250 UNIT/GM external ointment Apply topically daily 30 g 1     Continuous Blood Gluc Sensor (FREESTYLE LUCERO 14 DAY SENSOR) MISC USE ONE EVERY 14 DAYS 6 each 3     ELIQUIS ANTICOAGULANT 5 MG tablet Take 1 tablet (5 mg) by mouth 2 times daily 200 tablet 5     gabapentin (NEURONTIN) 100 MG capsule Take 400 mg by mouth 3 times daily       hydrochlorothiazide (MICROZIDE) 12.5 MG capsule Take 1 capsule (12.5 mg) by mouth every morning 100 capsule 5     Insulin Aspart FlexPen 100 UNIT/ML SOPN USE DAILY WITH MEALS, USING UP " TO 70 UNITS PER DAY 30 mL 0     insulin degludec (TRESIBA FLEXTOUCH) 100 UNIT/ML pen 50-55 units subcutaneous daily 45 mL 0     insulin pen needle (B-D U/F) 31G X 5 MM miscellaneous USE TO INJECT LANTUS TWICE DAILY AND NOVOLOG THREE TIMES DAILY AS DIRECTED 500 each 0     ketoconazole (NIZORAL) 2 % external shampoo APPLY TOPICALLY DAILY AS NEEDED FOR ITCHING OR IRRITATION. SEE MD AFTER 6 WEEKS 120 mL 3     lisinopril (ZESTRIL) 40 MG tablet Take 1 tablet (40 mg) by mouth daily 100 tablet 5     MAGNESIUM GLYCINATE PLUS PO Take 400 mg by mouth 2 times daily       metFORMIN (GLUCOPHAGE XR) 500 MG 24 hr tablet TAKE 2 TABLETS BY MOUTH TWICE DAILY 360 tablet 0     Multiple Vitamins-Minerals (MULTIVITAMIN PO) Take 1 tablet by mouth daily        Omega-3 Fatty Acids (OMEGA-3 FISH OIL PO) Take 1 g by mouth 2 times daily (with meals)        omeprazole (PRILOSEC) 40 MG DR capsule Take 40 mg by mouth daily       OZEMPIC, 1 MG/DOSE, 4 MG/3ML SOPN INJECT 1MG SUBCUTANEOUSLY AS DIRECTED EVERY 7 DAYS ON WEDNESDAYS 3 mL 0     primidone (MYSOLINE) 50 MG tablet Take by mouth every 12 hours           ALLERGIES:  Allergies   Allergen Reactions     Hmg-Coa-R Inhibitors Swelling     Other reaction(s): Other (see comments)  SIMCOR caused edema in extremities       Bactrim [Sulfamethoxazole W/Trimethoprim] Nausea and Vomiting     Sulfa Drugs Nausea     Niacin Swelling     SIMCOR caused edema in extremities     Simvastatin Swelling     SIMCOR caused edema in extremities       PAST MEDICAL HISTORY:    Past Medical History:   Diagnosis Date     Ascending aorta dilatation (H)      Cerebral infarction (H)     2010     Gastroesophageal reflux disease with esophagitis      H/O blood clots 2022     Hyperlipidemia LDL goal <70      Hypertension      Nonalcoholic steatohepatitis 11/30/2022     Numbness and tingling      Obesity      GILA (obstructive sleep apnea) 10/22/2018    CPAP intolerant     PVD (peripheral vascular disease) (H)     s/p left partial  toe amputation     Renal stone      Tremor      Type 2 diabetes mellitus with diabetic polyneuropathy, with long-term current use of insulin (H)        PAST SURGICAL HISTORY:    Past Surgical History:   Procedure Laterality Date     AMPUTATE FOOT Left 8/18/2016    Procedure: AMPUTATE FOOT;  Surgeon: Jack Younger DPM;  [facility]     AMPUTATE TOE(S) Right 2/1/2016    Procedure: AMPUTATE TOE(S);  Surgeon: Rachelle Manriquez DPM, Pod;  [facility]     AMPUTATE TOE(S) Right 8/2/2019    Procedure: Right fourth toe partial amputation for treatment of osteomyelitis.;  Surgeon: Jack Younger DPM;  [facility]: [facility]     AMPUTATE TOE(S) Left 11/8/2019    Procedure: Left second toe amputation at the metatarsophalangeal joint.;  Surgeon: Rachelle Manriquez DPM, Podiatry/Foot and Ankle Surgery;  [facility]     AMPUTATE TOE(S) Right 6/5/2022    Procedure: AMPUTATION OF RIGHT GREAT TOE;  Surgeon: Wili Quiles DPM;  [facility]     AMPUTATE TOE(S) Right 7/28/2022    Procedure: Right foot partial first metatarsal resection;  Surgeon: Tiny Soto DPM;  Location: [facility]     ANGIOGRAM       BIOPSY BONE FOOT Right 7/24/2022    Procedure: Bone biopsy, right first metatarsal.;  Surgeon: Valentino Molina DPM;  Location: [facility]     COLONOSCOPY       COMBINED CYSTOSCOPY, RETROGRADES, URETEROSCOPY, INSERT STENT Left 8/21/2016    Procedure: COMBINED CYSTOSCOPY, RETROGRADES, URETEROSCOPY, INSERT STENT;  Surgeon: Artemio Valenzuela MD;  [facility]: [facility]     COMBINED CYSTOSCOPY, RETROGRADES, URETEROSCOPY, LASER HOLMIUM LITHOTRIPSY URETER(S), INSERT STENT Left 10/3/2016    Procedure: COMBINED CYSTOSCOPY, RETROGRADES, URETEROSCOPY, LASER HOLMIUM LITHOTRIPSY URETER(S), INSERT STENT;  Surgeon: Artemio Valenzuela MD;  [facility]     EXTRACORPOREAL SHOCK WAVE LITHOTRIPSY (ESWL) Left 9/8/2016    Procedure: EXTRACORPOREAL SHOCK WAVE LITHOTRIPSY (ESWL);  Surgeon: Artemio Valenzuela MD;   [facility]     INCISION AND DRAINAGE FOOT, COMBINED Right 7/24/2022    Procedure: Incision and drainage, right foot ;  Surgeon: Valentino Molina DPM;  [facility]: [facility]     OSTEOTOMY FOOT Right 11/30/2022    Procedure: 1. Right second metatarsal floating osteotomy  2. Right second digit proximal phalanx arthroplasty 3. Right percutaneous flexor tenotomy;  Surgeon: Tiny Soto DPM;  [facility]: [facility]     OSTEOTOMY FOOT Right 1/19/2023    Procedure: Right foot third metatarsal head excision, ulcer debridement, matatarsal osteotomies;  Surgeon: Tiny Soto DPM;  Location: [facility]     RECESSION GASTROCNEMIUS Right 2/1/2016    Procedure: RECESSION GASTROCNEMIUS;  Surgeon: Rachelle Manriquez DPM, Pod;  [facility]: [facility]       FAMILY HISTORY:    Family History   Problem Relation Age of Onset     Arthritis Mother         SLE     Connective Tissue Disorder Mother         lupus     Diabetes Mother      Cerebrovascular Disease Mother      Cancer Mother      Diabetes Father      Coronary Artery Disease Father      Chronic Obstructive Pulmonary Disease Father      Diabetes Sister      Diabetes Maternal Grandfather        SOCIAL HISTORY:    Social History     Socioeconomic History     Marital status:      Spouse name: Sydney     Number of children: 2     Years of education: None     Highest education level: Master's degree (e.g., MA, MS, Arleth, MEd, MSW, ELIANA)   Occupational History     Occupation: marketing     Employer: [employer]     Comment: [employer]   Tobacco Use     Smoking status: Never     Smokeless tobacco: Never   Vaping Use     Vaping Use: Never used   Substance and Sexual Activity     Alcohol use: Yes     Comment: rare---red wine 3x per month     Drug use: Never     Sexual activity: Not Currently     Partners: Female   Other Topics Concern     Parent/sibling w/ CABG, MI or angioplasty before 65F 55M? No     Social Determinants of Health     Financial Resource Strain: Low Risk      Difficulty of  Paying Living Expenses: Not very hard   Food Insecurity: No Food Insecurity     Worried About Running Out of Food in the Last Year: Never true     Ran Out of Food in the Last Year: Never true   Transportation Needs: No Transportation Needs     Lack of Transportation (Medical): No     Lack of Transportation (Non-Medical): No   Physical Activity: Sufficiently Active     Days of Exercise per Week: 5 days     Minutes of Exercise per Session: 50 min   Stress: No Stress Concern Present     Feeling of Stress : Not at all   Social Connections: Moderately Integrated     Frequency of Communication with Friends and Family: Twice a week     Frequency of Social Gatherings with Friends and Family: Never     Attends Buddhism Services: More than 4 times per year     Active Member of Clubs or Organizations: Yes     Marital Status:    Housing Stability: Low Risk      Unable to Pay for Housing in the Last Year: No     Number of Places Lived in the Last Year: 2     Unstable Housing in the Last Year: No

## 2023-02-09 NOTE — PROGRESS NOTES
Service Date: 02/09/2023    PRIMARY CARE PROVIDER:  Johann Serna DO    REASON FOR VISIT:    1.  Followup of aortic root and ascending aorta dilatation with CT angiogram results.  2.  Followup of hypertension after medication changes.    HISTORY OF PRESENT ILLNESS:    As always, it was delightful to visit with Judie, who is a younger appearing 62-year-old  male, lives with his wife.  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    His history is significant for hypertension, remote stroke in 2010 with no residual deficits, type 2 diabetes mellitus (on insulin, well controlled, complicated by neuropathy, status post left partial toe amputation), hyperlipidemia with goal LDL less than 70, obstructive sleep apnea (CPAP intolerant), BMI 31, right bundle branch block, aortic root and ascending aorta dilatation, never tobacco user.    The following issues were addressed today:    1.  Hypertension management.    At his last visit, his blood pressure was elevated.  I made some adjustments and his blood pressure is excellent at 126/78 with pulse of 60 BPM.  He is on lisinopril 40 mg daily and hydrochlorothiazide 12.5 mg daily.    2.  Aortic root and ascending aorta dilatation.    I personally reviewed his CT angiogram that confirms his aortic root is mildly dilated at 4.1 cm.  Ascending aorta is mildly dilated at 4.3 cm.  He is 5 feet 11 inches tall.  There were no other significant incidental findings.    3.  Hyperlipidemia.    His LDL is 45, HDL 42, triglycerides 99.  On atorvastatin 40 mg daily.    4.  DVT in 2022.    He is on Eliquis for this.  Aspirin stopped at last visit.    I personally reviewed his labs and CT angiogram as documented above.    ASSESSMENT AND PLAN:    The patient has chronic stable mild aortic root and ascending aorta dilatation in the context of a long history of hypertension, type 2 diabetes.  Apart from being CPAP intolerant, other risk factors are well  addressed.  His goal BP is 130/80 or below and he is at this level.    PLAN:    1.  Continue current medications.  Prescriptions renewed for a year.  2.  Follow up with me in 2 years with pre-visit transthoracic echocardiogram for monitoring his aortopathy.      Established patient. Total time today 30 minutes.    Singh Theodore MD        D: 2023   T: 2023   MT: LISSETTE    Name:     CEDRIC LAW  MRN:      [id number]        Account:      [id number]   :      1960           Service Date: 2023       Document: [id number]

## 2023-02-16 ENCOUNTER — LAB REQUISITION (OUTPATIENT)
Dept: LAB | Facility: CLINIC | Age: 63
End: 2023-02-16
Payer: COMMERCIAL

## 2023-02-16 ENCOUNTER — TRANSFERRED RECORDS (OUTPATIENT)
Dept: MULTI SPECIALTY CLINIC | Facility: CLINIC | Age: 63
End: 2023-02-16

## 2023-02-16 DIAGNOSIS — L97.512 NON-PRESSURE CHRONIC ULCER OF OTHER PART OF RIGHT FOOT WITH FAT LAYER EXPOSED (H): ICD-10-CM

## 2023-02-16 LAB
AST SERPL W P-5'-P-CCNC: 24 U/L (ref 10–50)
BASOPHILS # BLD AUTO: 0 10E3/UL (ref 0–0.2)
BASOPHILS NFR BLD AUTO: 1 %
BUN SERPL-MCNC: 19.5 MG/DL (ref 8–23)
CREAT SERPL-MCNC: 1.11 MG/DL (ref 0.67–1.17)
CRP SERPL-MCNC: 5.92 MG/L
EOSINOPHIL # BLD AUTO: 0.2 10E3/UL (ref 0–0.7)
EOSINOPHIL NFR BLD AUTO: 5 %
ERYTHROCYTE [DISTWIDTH] IN BLOOD BY AUTOMATED COUNT: 14.5 % (ref 10–15)
ERYTHROCYTE [SEDIMENTATION RATE] IN BLOOD BY WESTERGREN METHOD: 26 MM/HR (ref 0–20)
GFR SERPL CREATININE-BSD FRML MDRD: 76 ML/MIN/1.73M2
HCT VFR BLD AUTO: 38.2 % (ref 40–53)
HGB BLD-MCNC: 11.8 G/DL (ref 13.3–17.7)
IMM GRANULOCYTES # BLD: 0 10E3/UL
IMM GRANULOCYTES NFR BLD: 0 %
LYMPHOCYTES # BLD AUTO: 1.5 10E3/UL (ref 0.8–5.3)
LYMPHOCYTES NFR BLD AUTO: 32 %
MCH RBC QN AUTO: 26.8 PG (ref 26.5–33)
MCHC RBC AUTO-ENTMCNC: 30.9 G/DL (ref 31.5–36.5)
MCV RBC AUTO: 87 FL (ref 78–100)
MONOCYTES # BLD AUTO: 0.3 10E3/UL (ref 0–1.3)
MONOCYTES NFR BLD AUTO: 6 %
NEUTROPHILS # BLD AUTO: 2.5 10E3/UL (ref 1.6–8.3)
NEUTROPHILS NFR BLD AUTO: 56 %
NRBC # BLD AUTO: 0 10E3/UL
NRBC BLD AUTO-RTO: 0 /100
PLATELET # BLD AUTO: 167 10E3/UL (ref 150–450)
RBC # BLD AUTO: 4.4 10E6/UL (ref 4.4–5.9)
RETINOPATHY: NORMAL
RETINOPATHY: POSITIVE
WBC # BLD AUTO: 4.6 10E3/UL (ref 4–11)

## 2023-02-16 PROCEDURE — 84450 TRANSFERASE (AST) (SGOT): CPT | Performed by: INTERNAL MEDICINE

## 2023-02-16 PROCEDURE — 82565 ASSAY OF CREATININE: CPT | Performed by: INTERNAL MEDICINE

## 2023-02-16 PROCEDURE — 85652 RBC SED RATE AUTOMATED: CPT | Performed by: INTERNAL MEDICINE

## 2023-02-16 PROCEDURE — 85025 COMPLETE CBC W/AUTO DIFF WBC: CPT | Performed by: INTERNAL MEDICINE

## 2023-02-16 PROCEDURE — 86140 C-REACTIVE PROTEIN: CPT | Performed by: INTERNAL MEDICINE

## 2023-02-16 PROCEDURE — 84520 ASSAY OF UREA NITROGEN: CPT | Performed by: INTERNAL MEDICINE

## 2023-02-18 DIAGNOSIS — E11.42 TYPE 2 DIABETES MELLITUS WITH PERIPHERAL NEUROPATHY (H): ICD-10-CM

## 2023-02-19 ENCOUNTER — MYC MEDICAL ADVICE (OUTPATIENT)
Dept: ENDOCRINOLOGY | Facility: CLINIC | Age: 63
End: 2023-02-19
Payer: COMMERCIAL

## 2023-02-20 RX ORDER — SEMAGLUTIDE 1.34 MG/ML
INJECTION, SOLUTION SUBCUTANEOUS
Qty: 9 ML | Refills: 1 | Status: SHIPPED | OUTPATIENT
Start: 2023-02-20 | End: 2023-08-03

## 2023-02-21 RX ORDER — SEMAGLUTIDE 1.34 MG/ML
INJECTION, SOLUTION SUBCUTANEOUS
Qty: 3 ML | Refills: 0 | OUTPATIENT
Start: 2023-02-21

## 2023-02-21 NOTE — TELEPHONE ENCOUNTER
Duplicate request. Prescription refilled 2/20/23. Refusing refill request and closing encounter.     Monisha SIMPSON RN  [facility]

## 2023-02-22 ENCOUNTER — OFFICE VISIT (OUTPATIENT)
Dept: PODIATRY | Facility: CLINIC | Age: 63
End: 2023-02-22
Payer: COMMERCIAL

## 2023-02-22 VITALS — WEIGHT: 226 LBS | SYSTOLIC BLOOD PRESSURE: 132 MMHG | DIASTOLIC BLOOD PRESSURE: 68 MMHG | BODY MASS INDEX: 31.52 KG/M2

## 2023-02-22 DIAGNOSIS — L97.512 RIGHT FOOT ULCER, WITH FAT LAYER EXPOSED (H): Primary | ICD-10-CM

## 2023-02-22 DIAGNOSIS — L97.521 ULCER OF LEFT FOOT, LIMITED TO BREAKDOWN OF SKIN (H): ICD-10-CM

## 2023-02-22 DIAGNOSIS — S98.111A AMPUTATION OF RIGHT GREAT TOE (H): ICD-10-CM

## 2023-02-22 DIAGNOSIS — M20.41 HAMMER TOE OF RIGHT FOOT: ICD-10-CM

## 2023-02-22 PROCEDURE — 11042 DBRDMT SUBQ TIS 1ST 20SQCM/<: CPT | Mod: 58 | Performed by: PODIATRIST

## 2023-02-22 PROCEDURE — 99024 POSTOP FOLLOW-UP VISIT: CPT | Performed by: PODIATRIST

## 2023-02-22 NOTE — PROGRESS NOTES
[facility] [facility]  CLINIC NOTE    CHIEF COMPLAINT:  right foot    PATIENT HISTORY:  Crispin Rojas is a 62 year old male who follows up for right foot. Had third metatarsal head excision and fourth metatarsal osteotomy on 1/19/23. He's been on IV abx per ID due to possible residual osteomyelitis that was pseudomonas resistant to all oral options.   -He states since last appt ulcer has been dry. States no new issues or pain. Changing bandage less now that site is draining less. Denies N/F/V/C/D. Is still on IV abx. Is still limiting his walking.     Review of Systems:  A 10 point review of systems was performed and is positive for that noted above in the patient history.  All other areas are negative.     PAST MEDICAL HISTORY:   Past Medical History:   Diagnosis Date     Ascending aorta dilatation (H)      Cerebral infarction (H)     2010     Gastroesophageal reflux disease with esophagitis      H/O blood clots 2022     Hyperlipidemia LDL goal <70      Hypertension      Nonalcoholic steatohepatitis 11/30/2022     Numbness and tingling      Obesity      GILA (obstructive sleep apnea) 10/22/2018    CPAP intolerant     PVD (peripheral vascular disease) (H)     s/p left partial toe amputation     Renal stone      Tremor      Type 2 diabetes mellitus with diabetic polyneuropathy, with long-term current use of insulin (H)         PAST SURGICAL HISTORY:   Past Surgical History:   Procedure Laterality Date     AMPUTATE FOOT Left 8/18/2016    Procedure: AMPUTATE FOOT;  Surgeon: Jack Younger DPM;  [facility]     AMPUTATE TOE(S) Right 2/1/2016    Procedure: AMPUTATE TOE(S);  Surgeon: Rachelle Manriquez DPM, Pod;  [facility]     AMPUTATE TOE(S) Right 8/2/2019    Procedure: Right fourth toe partial amputation for treatment of osteomyelitis.;  Surgeon: Jack Younger DPM;  [facility]     AMPUTATE TOE(S) Left 11/8/2019    Procedure: Left second toe amputation at the  metatarsophalangeal joint.;  Surgeon: Rachelle Manriquez DPM, Podiatry/Foot and Ankle Surgery;  [facility]: [facility]     AMPUTATE TOE(S) Right 6/5/2022    Procedure: AMPUTATION OF RIGHT GREAT TOE;  Surgeon: Wili Quiles DPM;  [facility]     AMPUTATE TOE(S) Right 7/28/2022    Procedure: Right foot partial first metatarsal resection;  Surgeon: Tiny Soto DPM;  [facility]: [facility]     ANGIOGRAM       BIOPSY BONE FOOT Right 7/24/2022    Procedure: Bone biopsy, right first metatarsal.;  Surgeon: Valentino Molina DPM;  [facility]: [facility]     COLONOSCOPY       COMBINED CYSTOSCOPY, RETROGRADES, URETEROSCOPY, INSERT STENT Left 8/21/2016    Procedure: COMBINED CYSTOSCOPY, RETROGRADES, URETEROSCOPY, INSERT STENT;  Surgeon: Artemio Valenzuela MD;  [facility]     COMBINED CYSTOSCOPY, RETROGRADES, URETEROSCOPY, LASER HOLMIUM LITHOTRIPSY URETER(S), INSERT STENT Left 10/3/2016    Procedure: COMBINED CYSTOSCOPY, RETROGRADES, URETEROSCOPY, LASER HOLMIUM LITHOTRIPSY URETER(S), INSERT STENT;  Surgeon: Artemio Valenzuela MD;  [facility]     EXTRACORPOREAL SHOCK WAVE LITHOTRIPSY (ESWL) Left 9/8/2016    Procedure: EXTRACORPOREAL SHOCK WAVE LITHOTRIPSY (ESWL);  Surgeon: Artemio Valenzuela MD;  [facility]     INCISION AND DRAINAGE FOOT, COMBINED Right 7/24/2022    Procedure: Incision and drainage, right foot ;  Surgeon: Valentino Molina DPM;  [facility]     OSTEOTOMY FOOT Right 11/30/2022    Procedure: 1. Right second metatarsal floating osteotomy  2. Right second digit proximal phalanx arthroplasty 3. Right percutaneous flexor tenotomy;  Surgeon: Tiny Soto DPM;  [facility]     OSTEOTOMY FOOT Right 1/19/2023    Procedure: Right foot third metatarsal head excision, ulcer debridement, matatarsal osteotomies;  Surgeon: Tiny Soto DPM;  [facility]     RECESSION GASTROCNEMIUS Right 2/1/2016    Procedure: RECESSION GASTROCNEMIUS;  Surgeon: Rachelle Manriquez DPM, Pod;   [facility]        MEDICATIONS:  Reviewed in Epic.     ALLERGIES:    Allergies   Allergen Reactions     Hmg-Coa-R Inhibitors Swelling     Other reaction(s): Other (see comments)  SIMCOR caused edema in extremities       Bactrim [Sulfamethoxazole W/Trimethoprim] Nausea and Vomiting     Sulfa Drugs Nausea     Niacin Swelling     SIMCOR caused edema in extremities     Simvastatin Swelling     SIMCOR caused edema in extremities        SOCIAL HISTORY:   Social History     Socioeconomic History     Marital status:      Spouse name: Sydney     Number of children: 2     Years of education: Not on file     Highest education level: Master's degree (e.g., MA, MS, Arleth, MEd, MSW, ELIANA)   Occupational History     Occupation: marketing     Employer: [employer]     Comment: [employer]   Tobacco Use     Smoking status: Never     Smokeless tobacco: Never   Vaping Use     Vaping Use: Never used   Substance and Sexual Activity     Alcohol use: Yes     Comment: rare---red wine 3x per month     Drug use: Never     Sexual activity: Not Currently     Partners: Female   Other Topics Concern     Parent/sibling w/ CABG, MI or angioplasty before 65F 55M? No   Social History Narrative     Not on file     Social Determinants of Health     Financial Resource Strain: Low Risk      Difficulty of Paying Living Expenses: Not very hard   Food Insecurity: No Food Insecurity     Worried About Running Out of Food in the Last Year: Never true     Ran Out of Food in the Last Year: Never true   Transportation Needs: No Transportation Needs     Lack of Transportation (Medical): No     Lack of Transportation (Non-Medical): No   Physical Activity: Sufficiently Active     Days of Exercise per Week: 5 days     Minutes of Exercise per Session: 50 min   Stress: No Stress Concern Present     Feeling of Stress : Not at all   Social Connections: Moderately Integrated     Frequency of Communication with Friends and Family: Twice a week     Frequency of  Social Gatherings with Friends and Family: Never     Attends Tenriism Services: More than 4 times per year     Active Member of Clubs or Organizations: Yes     Attends Club or Organization Meetings: Not on file     Marital Status:    Intimate Partner Violence: Not on file   Housing Stability: Low Risk      Unable to Pay for Housing in the Last Year: No     Number of Places Lived in the Last Year: 2     Unstable Housing in the Last Year: No        FAMILY HISTORY:   Family History   Problem Relation Age of Onset     Arthritis Mother         SLE     Connective Tissue Disorder Mother         lupus     Diabetes Mother      Cerebrovascular Disease Mother      Cancer Mother      Diabetes Father      Coronary Artery Disease Father      Chronic Obstructive Pulmonary Disease Father      Diabetes Sister      Diabetes Maternal Grandfather         EXAM:Vitals: Wt 102.5 kg (226 lb)   BMI 31.52 kg/m    BMI= Body mass index is 31.52 kg/m .      General appearance: Patient is alert and fully cooperative with history & exam.  No sign of distress is noted during the visit.      Respiratory: Breathing is regular & unlabored while sitting.      HEENT: Hearing is intact to spoken word.  Speech is clear.  No gross evidence of visual impairment that would impact ambulation.      Dermatologic:  Right foot: submet three ulcer measures 1.5 x .5 x .1 cm. Smaller in size. Granular. No probe to bone. No cellulitis. Dorsal fourth digit, no longer black. Well perfused. incisoin site now fully healed.     Left foot submet 3 callus/preuclerative lesion, debrided to level of dermis. No open lesions or cellulitis. Preulcerative      Vascular: Dorsalis pedis and posterior tibial pulses are intact & regular bilaterally.  CFT and skin temperature is normal to both lower extremities.       Neurologic: Lower extremity sensation is diminished, bilateral foot, to light touch.  No evidence of neurological-based weakness or contracture in the lower  extremities.       Musculoskeletal: Patient is ambulatory without an assistive device or brace.  S/p right first ray resection. Right 2nd digit --> now more rectus.   S/p left hallux and second digit amputations     Psychiatric: Affect is pleasant & appropriate.      Cultures:     Foot, Right; Tissue    0 Result Notes  Culture 2+ Pseudomonas aeruginosa Abnormal        Isolated in broth only Enterococcus faecalis Abnormal     On day 1 of incubation   1+ Normal josé            Resulting Agency: [facility]     Susceptibility     Pseudomonas aeruginosa Enterococcus faecalis     JEFFREY JEFFREY     Amikacin <=2 ug/mL Susceptible       Ampicillin   <=2 ug/mL Susceptible     Cefepime <=1 ug/mL Susceptible       Ceftazidime <=1 ug/mL Susceptible       Ciprofloxacin 2 ug/mL Resistant       Gentamicin <=1 ug/mL Susceptible       Gentamicin Synergy   Susceptible... Susceptible 1     Levofloxacin >=8 ug/mL Resistant       Meropenem 1 ug/mL Susceptible       Penicillin   2 ug/mL Susceptible     Piperacillin/Tazobactam <=4 ug/mL Susceptible       Tobramycin <=1 ug/mL Susceptible       Vancomycin   1 ug/mL Susceptible                 Foot, Right; Tissue    0 Result Notes  Culture 4+ Bacteroides fragilis Abnormal     Susceptibilities not routinely done   2+ Anaerococcus species Abnormal     Susceptibilities not routinely done        Pathology:     Final Diagnosis   Right third toe, metatarsal head, amputation-  -Bone with changes consistent with extensive acute osteomyelitis.             PROCEDURE:   Verbal consent was obtained for debridement. A 15 blade was used to excise the nonivable tissue to the ulcer, down to and including subcutaneous tissue. No noted purulence afterwards. Debrided site measures 2.0 cm x 1. x .1 cm. No probe to bone. Well tolerated. Site was cleaned with alcohol.  (right foot)     Verbal consent was obtained for debridement. A 15 blade was used to excise the nonivable tissue to the ulcer, down to and including dermis.  No noted purulence afterwards. Debrided site measures 2.0 cm x 2 x .1 cm. No probe to bone. Well tolerated. Site was cleaned with alcohol.  (left foot)     Sed rate: 65 --> 44 --> 33 --> 26  CRP: 34 --> 7 --> 4 --> 5  WBC: 4.6      ASSESSMENT:  1. S/p right foot third head resection, fourth metatarsal floating osteotomy, ulcer debridement  --> being treated for residual osteomyelitis, 1/19/23   2. Left foot submet three preulcerative lesion     MEDICAL DECISION MAKING:   -Right foot much  Improved. Ulcer smaller and drier. Appears to be healing   -Okay to begin getting right foot wet in the shower. Plan to apply thick lotion to all of dry skin. Cont dry bandage to right foot ulcer   -Left foot calluses debrided. Right foot ulcer debrided. No open lesions to left side   -Discussed transition to more WB to right side. Okay to start wearing regular shoe as able. Monitor closely for any worsening/increasing drainage  -Also discussed diabetic shoes and need to follow up on new rx. Sent to [facility] two weeks ago. Importance of continue protection to left foot   -F/u in 2 weeks.           Tiny Soto DPM   [place] [facility] of Pod[facility]/Foot & Ankle Surgery

## 2023-02-22 NOTE — LETTER
2/22/2023         RE: Crispin Rojas  [address] [facility] [address]        Dear Colleague,    Thank you for referring your patient, Crispin Rojas, to the [facility]. Please see a copy of my visit note below.    [place] [facility]OT & ANKLE SURG[facility]  CLINIC NOTE    CHIEF COMPLAINT:  right foot    PATIENT HISTORY:  Crispin Rojas is a 62 year old male who follows up for right foot. Had third metatarsal head excision and fourth metatarsal osteotomy on 1/19/23. He's been on IV abx per ID due to possible residual osteomyelitis that was pseudomonas resistant to all oral options.   -He states since last appt ulcer has been dry. States no new issues or pain. Changing bandage less now that site is draining less. Denies N/F/V/C/D. Is still on IV abx. Is still limiting his walking.     Review of Systems:  A 10 point review of systems was performed and is positive for that noted above in the patient history.  All other areas are negative.     PAST MEDICAL HISTORY:   Past Medical History:   Diagnosis Date     Ascending aorta dilatation (H)      Cerebral infarction (H)     2010     Gastroesophageal reflux disease with esophagitis      H/O blood clots 2022     Hyperlipidemia LDL goal <70      Hypertension      Nonalcoholic steatohepatitis 11/30/2022     Numbness and tingling      Obesity      GILA (obstructive sleep apnea) 10/22/2018    CPAP intolerant     PVD (peripheral vascular disease) (H)     s/p left partial toe amputation     Renal stone      Tremor      Type 2 diabetes mellitus with diabetic polyneuropathy, with long-term current use of insulin (H)         PAST SURGICAL HISTORY:   Past Surgical History:   Procedure Laterality Date     AMPUTATE FOOT Left 8/18/2016    Procedure: AMPUTATE FOOT;  Surgeon: Jack Younger DPM;  [facility]     AMPUTATE TOE(S) Right 2/1/2016    Procedure: AMPUTATE TOE(S);  Surgeon: Rachelle Manriquez DPM, Pod;  [facility]     AMPUTATE  TOE(S) Right 8/2/2019    Procedure: Right fourth toe partial amputation for treatment of osteomyelitis.;  Surgeon: Jack Younger DPM;  [facility]     AMPUTATE TOE(S) Left 11/8/2019    Procedure: Left second toe amputation at the metatarsophalangeal joint.;  Surgeon: Rachelle Manriquez DPM, Podiatry/Foot and Ankle Surgery;  [facility]: [facility]     AMPUTATE TOE(S) Right 6/5/2022    Procedure: AMPUTATION OF RIGHT GREAT TOE;  Surgeon: Wili Quiles DPM;  [facility]     AMPUTATE TOE(S) Right 7/28/2022    Procedure: Right foot partial first metatarsal resection;  Surgeon: Tiny Soto DPM;  [facility]: [facility]     ANGIOGRAM       BIOPSY BONE FOOT Right 7/24/2022    Procedure: Bone biopsy, right first metatarsal.;  Surgeon: Valentino Molina DPM;  [facility]: [facility]     COLONOSCOPY       COMBINED CYSTOSCOPY, RETROGRADES, URETEROSCOPY, INSERT STENT Left 8/21/2016    Procedure: COMBINED CYSTOSCOPY, RETROGRADES, URETEROSCOPY, INSERT STENT;  Surgeon: Artemio Valenzuela MD;  [facility]     COMBINED CYSTOSCOPY, RETROGRADES, URETEROSCOPY, LASER HOLMIUM LITHOTRIPSY URETER(S), INSERT STENT Left 10/3/2016    Procedure: COMBINED CYSTOSCOPY, RETROGRADES, URETEROSCOPY, LASER HOLMIUM LITHOTRIPSY URETER(S), INSERT STENT;  Surgeon: Artemio Valenzuela MD;  [facility]     EXTRACORPOREAL SHOCK WAVE LITHOTRIPSY (ESWL) Left 9/8/2016    Procedure: EXTRACORPOREAL SHOCK WAVE LITHOTRIPSY (ESWL);  Surgeon: Artemio Valenzuela MD;  [facility]     INCISION AND DRAINAGE FOOT, COMBINED Right 7/24/2022    Procedure: Incision and drainage, right foot ;  Surgeon: Valentino Molina DPM;  [facility]     OSTEOTOMY FOOT Right 11/30/2022    Procedure: 1. Right second metatarsal floating osteotomy  2. Right second digit proximal phalanx arthroplasty 3. Right percutaneous flexor tenotomy;  Surgeon: Tiny Soto DPM;  [facility]     OSTEOTOMY FOOT Right 1/19/2023    Procedure: Right foot third  metatarsal head excision, ulcer debridement, matatarsal osteotomies;  Surgeon: Tiny Soto DPM;  [facility]     RECESSION GASTROCNEMIUS Right 2/1/2016    Procedure: RECESSION GASTROCNEMIUS;  Surgeon: Rachelle Manriquez DPM, Pod;  [facility]        MEDICATIONS:  Reviewed in Epic.     ALLERGIES:    Allergies   Allergen Reactions     Hmg-Coa-R Inhibitors Swelling     Other reaction(s): Other (see comments)  SIMCOR caused edema in extremities       Bactrim [Sulfamethoxazole W/Trimethoprim] Nausea and Vomiting     Sulfa Drugs Nausea     Niacin Swelling     SIMCOR caused edema in extremities     Simvastatin Swelling     SIMCOR caused edema in extremities        SOCIAL HISTORY:   Social History     Socioeconomic History     Marital status:      Spouse name: Sydney     Number of children: 2     Years of education: Not on file     Highest education level: Master's degree (e.g., MA, MS, Arleth, MEd, MSW, ELIANA)   Occupational History     Occupation: marketing     Employer: [employer]     Comment: [employer]   Tobacco Use     Smoking status: Never     Smokeless tobacco: Never   Vaping Use     Vaping Use: Never used   Substance and Sexual Activity     Alcohol use: Yes     Comment: rare---red wine 3x per month     Drug use: Never     Sexual activity: Not Currently     Partners: Female   Other Topics Concern     Parent/sibling w/ CABG, MI or angioplasty before 65F 55M? No   Social History Narrative     Not on file     Social Determinants of Health     Financial Resource Strain: Low Risk      Difficulty of Paying Living Expenses: Not very hard   Food Insecurity: No Food Insecurity     Worried About Running Out of Food in the Last Year: Never true     Ran Out of Food in the Last Year: Never true   Transportation Needs: No Transportation Needs     Lack of Transportation (Medical): No     Lack of Transportation (Non-Medical): No   Physical Activity: Sufficiently Active     Days of Exercise per Week: 5 days      Minutes of Exercise per Session: 50 min   Stress: No Stress Concern Present     Feeling of Stress : Not at all   Social Connections: Moderately Integrated     Frequency of Communication with Friends and Family: Twice a week     Frequency of Social Gatherings with Friends and Family: Never     Attends Anglican Services: More than 4 times per year     Active Member of Clubs or Organizations: Yes     Attends Club or Organization Meetings: Not on file     Marital Status:    Intimate Partner Violence: Not on file   Housing Stability: Low Risk      Unable to Pay for Housing in the Last Year: No     Number of Places Lived in the Last Year: 2     Unstable Housing in the Last Year: No        FAMILY HISTORY:   Family History   Problem Relation Age of Onset     Arthritis Mother         SLE     Connective Tissue Disorder Mother         lupus     Diabetes Mother      Cerebrovascular Disease Mother      Cancer Mother      Diabetes Father      Coronary Artery Disease Father      Chronic Obstructive Pulmonary Disease Father      Diabetes Sister      Diabetes Maternal Grandfather         EXAM:Vitals: Wt 102.5 kg (226 lb)   BMI 31.52 kg/m    BMI= Body mass index is 31.52 kg/m .      General appearance: Patient is alert and fully cooperative with history & exam.  No sign of distress is noted during the visit.      Respiratory: Breathing is regular & unlabored while sitting.      HEENT: Hearing is intact to spoken word.  Speech is clear.  No gross evidence of visual impairment that would impact ambulation.      Dermatologic:  Right foot: submet three ulcer measures 1.5 x .5 x .1 cm. Smaller in size. Granular. No probe to bone. No cellulitis. Dorsal fourth digit, no longer black. Well perfused. incisoin site now fully healed.     Left foot submet 3 callus/preuclerative lesion, debrided to level of dermis. No open lesions or cellulitis. Preulcerative      Vascular: Dorsalis pedis and posterior tibial pulses are intact & regular  bilaterally.  CFT and skin temperature is normal to both lower extremities.       Neurologic: Lower extremity sensation is diminished, bilateral foot, to light touch.  No evidence of neurological-based weakness or contracture in the lower extremities.       Musculoskeletal: Patient is ambulatory without an assistive device or brace.  S/p right first ray resection. Right 2nd digit --> now more rectus.   S/p left hallux and second digit amputations     Psychiatric: Affect is pleasant & appropriate.      Cultures:     Foot, Right; Tissue    0 Result Notes  Culture 2+ Pseudomonas aeruginosa Abnormal        Isolated in broth only Enterococcus faecalis Abnormal     On day 1 of incubation   1+ Normal josé            Resulting Agency: [facility]     Susceptibility     Pseudomonas aeruginosa Enterococcus faecalis     JEFFREY JEFFREY     Amikacin <=2 ug/mL Susceptible       Ampicillin   <=2 ug/mL Susceptible     Cefepime <=1 ug/mL Susceptible       Ceftazidime <=1 ug/mL Susceptible       Ciprofloxacin 2 ug/mL Resistant       Gentamicin <=1 ug/mL Susceptible       Gentamicin Synergy   Susceptible... Susceptible 1     Levofloxacin >=8 ug/mL Resistant       Meropenem 1 ug/mL Susceptible       Penicillin   2 ug/mL Susceptible     Piperacillin/Tazobactam <=4 ug/mL Susceptible       Tobramycin <=1 ug/mL Susceptible       Vancomycin   1 ug/mL Susceptible                 Foot, Right; Tissue    0 Result Notes  Culture 4+ Bacteroides fragilis Abnormal     Susceptibilities not routinely done   2+ Anaerococcus species Abnormal     Susceptibilities not routinely done        Pathology:     Final Diagnosis   Right third toe, metatarsal head, amputation-  -Bone with changes consistent with extensive acute osteomyelitis.             PROCEDURE:   Verbal consent was obtained for debridement. A 15 blade was used to excise the nonivable tissue to the ulcer, down to and including subcutaneous tissue. No noted purulence afterwards. Debrided site measures 2.0 cm  x 1. x .1 cm. No probe to bone. Well tolerated. Site was cleaned with alcohol.  (right foot)     Verbal consent was obtained for debridement. A 15 blade was used to excise the nonivable tissue to the ulcer, down to and including dermis. No noted purulence afterwards. Debrided site measures 2.0 cm x 2 x .1 cm. No probe to bone. Well tolerated. Site was cleaned with alcohol.  (left foot)     Sed rate: 65 --> 44 --> 33 --> 26  CRP: 34 --> 7 --> 4 --> 5  WBC: 4.6      ASSESSMENT:  1. S/p right foot third head resection, fourth metatarsal floating osteotomy, ulcer debridement  --> being treated for residual osteomyelitis, 1/19/23   2. Left foot submet three preulcerative lesion     MEDICAL DECISION MAKING:   -Right foot much  Improved. Ulcer smaller and drier. Appears to be healing   -Okay to begin getting right foot wet in the shower. Plan to apply thick lotion to all of dry skin. Cont dry bandage to right foot ulcer   -Left foot calluses debrided. Right foot ulcer debrided. No open lesions to left side   -Discussed transition to more WB to right side. Okay to start wearing regular shoe as able. Monitor closely for any worsening/increasing drainage  -Also discussed diabetic shoes and need to follow up on new rx. [facility]. Importance of continue protection to left foot   -F/u in 2 weeks.           Tiny Soto DPM   [place] Department of Podiatry/Foot & Ankle Surgery                  Again, thank you for allowing me to participate in the care of your patient.        Sincerely,        Tiny Soto DPM

## 2023-02-23 ENCOUNTER — LAB REQUISITION (OUTPATIENT)
Dept: LAB | Facility: CLINIC | Age: 63
End: 2023-02-23
Payer: COMMERCIAL

## 2023-02-23 DIAGNOSIS — L97.512 NON-PRESSURE CHRONIC ULCER OF OTHER PART OF RIGHT FOOT WITH FAT LAYER EXPOSED (H): ICD-10-CM

## 2023-02-23 LAB
AST SERPL W P-5'-P-CCNC: 25 U/L (ref 10–50)
BASOPHILS # BLD AUTO: 0 10E3/UL (ref 0–0.2)
BASOPHILS NFR BLD AUTO: 1 %
BUN SERPL-MCNC: 17.2 MG/DL (ref 8–23)
CREAT SERPL-MCNC: 0.95 MG/DL (ref 0.67–1.17)
CRP SERPL-MCNC: 4.5 MG/L
EOSINOPHIL # BLD AUTO: 0.1 10E3/UL (ref 0–0.7)
EOSINOPHIL NFR BLD AUTO: 5 %
ERYTHROCYTE [DISTWIDTH] IN BLOOD BY AUTOMATED COUNT: 14.6 % (ref 10–15)
ERYTHROCYTE [SEDIMENTATION RATE] IN BLOOD BY WESTERGREN METHOD: 26 MM/HR (ref 0–20)
GFR SERPL CREATININE-BSD FRML MDRD: >90 ML/MIN/1.73M2
HCT VFR BLD AUTO: 34.3 % (ref 40–53)
HGB BLD-MCNC: 10.8 G/DL (ref 13.3–17.7)
HOLD SPECIMEN: NORMAL
IMM GRANULOCYTES # BLD: 0 10E3/UL
IMM GRANULOCYTES NFR BLD: 0 %
LYMPHOCYTES # BLD AUTO: 0.8 10E3/UL (ref 0.8–5.3)
LYMPHOCYTES NFR BLD AUTO: 31 %
MCH RBC QN AUTO: 27 PG (ref 26.5–33)
MCHC RBC AUTO-ENTMCNC: 31.5 G/DL (ref 31.5–36.5)
MCV RBC AUTO: 86 FL (ref 78–100)
MONOCYTES # BLD AUTO: 0.2 10E3/UL (ref 0–1.3)
MONOCYTES NFR BLD AUTO: 7 %
NEUTROPHILS # BLD AUTO: 1.4 10E3/UL (ref 1.6–8.3)
NEUTROPHILS NFR BLD AUTO: 56 %
NRBC # BLD AUTO: 0 10E3/UL
NRBC BLD AUTO-RTO: 0 /100
PLATELET # BLD AUTO: 116 10E3/UL (ref 150–450)
RBC # BLD AUTO: 4 10E6/UL (ref 4.4–5.9)
WBC # BLD AUTO: 2.6 10E3/UL (ref 4–11)

## 2023-02-23 PROCEDURE — 84520 ASSAY OF UREA NITROGEN: CPT | Performed by: INTERNAL MEDICINE

## 2023-02-23 PROCEDURE — 86140 C-REACTIVE PROTEIN: CPT | Performed by: INTERNAL MEDICINE

## 2023-02-23 PROCEDURE — 82565 ASSAY OF CREATININE: CPT | Performed by: INTERNAL MEDICINE

## 2023-02-23 PROCEDURE — 84450 TRANSFERASE (AST) (SGOT): CPT | Performed by: INTERNAL MEDICINE

## 2023-02-23 PROCEDURE — 85652 RBC SED RATE AUTOMATED: CPT | Performed by: INTERNAL MEDICINE

## 2023-02-23 PROCEDURE — 85014 HEMATOCRIT: CPT | Performed by: INTERNAL MEDICINE

## 2023-02-27 ENCOUNTER — MEDICAL CORRESPONDENCE (OUTPATIENT)
Dept: HEALTH INFORMATION MANAGEMENT | Facility: CLINIC | Age: 63
End: 2023-02-27

## 2023-02-27 ENCOUNTER — LAB REQUISITION (OUTPATIENT)
Dept: LAB | Facility: CLINIC | Age: 63
End: 2023-02-27
Payer: COMMERCIAL

## 2023-02-27 DIAGNOSIS — M86.171 ACUTE OSTEOMYELITIS OF RIGHT ANKLE OR FOOT (H): Primary | ICD-10-CM

## 2023-02-27 DIAGNOSIS — L97.512 NON-PRESSURE CHRONIC ULCER OF OTHER PART OF RIGHT FOOT WITH FAT LAYER EXPOSED (H): ICD-10-CM

## 2023-02-27 LAB
AST SERPL W P-5'-P-CCNC: 40 U/L (ref 10–50)
BASOPHILS # BLD AUTO: 0 10E3/UL (ref 0–0.2)
BASOPHILS NFR BLD AUTO: 1 %
BUN SERPL-MCNC: 11.8 MG/DL (ref 8–23)
CREAT SERPL-MCNC: 0.87 MG/DL (ref 0.67–1.17)
CRP SERPL-MCNC: 10.45 MG/L
EOSINOPHIL # BLD AUTO: 0.1 10E3/UL (ref 0–0.7)
EOSINOPHIL NFR BLD AUTO: 5 %
ERYTHROCYTE [DISTWIDTH] IN BLOOD BY AUTOMATED COUNT: 15.2 % (ref 10–15)
ERYTHROCYTE [SEDIMENTATION RATE] IN BLOOD BY WESTERGREN METHOD: 33 MM/HR (ref 0–20)
GFR SERPL CREATININE-BSD FRML MDRD: >90 ML/MIN/1.73M2
HCT VFR BLD AUTO: 36.7 % (ref 40–53)
HGB BLD-MCNC: 11.5 G/DL (ref 13.3–17.7)
IMM GRANULOCYTES # BLD: 0 10E3/UL
IMM GRANULOCYTES NFR BLD: 1 %
LYMPHOCYTES # BLD AUTO: 0.7 10E3/UL (ref 0.8–5.3)
LYMPHOCYTES NFR BLD AUTO: 32 %
MCH RBC QN AUTO: 26.6 PG (ref 26.5–33)
MCHC RBC AUTO-ENTMCNC: 31.3 G/DL (ref 31.5–36.5)
MCV RBC AUTO: 85 FL (ref 78–100)
MONOCYTES # BLD AUTO: 0.1 10E3/UL (ref 0–1.3)
MONOCYTES NFR BLD AUTO: 5 %
NEUTROPHILS # BLD AUTO: 1.2 10E3/UL (ref 1.6–8.3)
NEUTROPHILS NFR BLD AUTO: 56 %
NRBC # BLD AUTO: 0 10E3/UL
NRBC BLD AUTO-RTO: 0 /100
PLATELET # BLD AUTO: 127 10E3/UL (ref 150–450)
RBC # BLD AUTO: 4.33 10E6/UL (ref 4.4–5.9)
WBC # BLD AUTO: 2.1 10E3/UL (ref 4–11)

## 2023-02-27 PROCEDURE — 84520 ASSAY OF UREA NITROGEN: CPT | Performed by: INTERNAL MEDICINE

## 2023-02-27 PROCEDURE — 84450 TRANSFERASE (AST) (SGOT): CPT | Performed by: INTERNAL MEDICINE

## 2023-02-27 PROCEDURE — 82565 ASSAY OF CREATININE: CPT | Performed by: INTERNAL MEDICINE

## 2023-02-27 PROCEDURE — 85025 COMPLETE CBC W/AUTO DIFF WBC: CPT | Performed by: INTERNAL MEDICINE

## 2023-02-27 PROCEDURE — 86140 C-REACTIVE PROTEIN: CPT | Performed by: INTERNAL MEDICINE

## 2023-02-27 PROCEDURE — 85652 RBC SED RATE AUTOMATED: CPT | Performed by: INTERNAL MEDICINE

## 2023-02-28 LAB — HOLD SPECIMEN: NORMAL

## 2023-03-01 ENCOUNTER — MEDICAL CORRESPONDENCE (OUTPATIENT)
Dept: HEALTH INFORMATION MANAGEMENT | Facility: CLINIC | Age: 63
End: 2023-03-01

## 2023-03-01 ENCOUNTER — TELEPHONE (OUTPATIENT)
Dept: PODIATRY | Facility: CLINIC | Age: 63
End: 2023-03-01
Payer: COMMERCIAL

## 2023-03-01 NOTE — TELEPHONE ENCOUNTER
Reason for Call:  Form, our goal is to have forms completed with 7 days, however, some forms may require a visit or additional information.    Type of letter, form or note:  orthotics order     Who is the form from?: [facility]    Where did the form come from: form was faxed in    Phone number of person requesting form: [phone]  Can we leave a detailed message on this number:  YES    Desired completion date of form: ASAP      How will form be returned?:  fax to [phone]    Has the patient signed a consent form for release of information (may be included with form)? Not Applicable    Additional comments:

## 2023-03-02 ENCOUNTER — MYC MEDICAL ADVICE (OUTPATIENT)
Dept: OTOLARYNGOLOGY | Facility: CLINIC | Age: 63
End: 2023-03-02

## 2023-03-02 ENCOUNTER — LAB REQUISITION (OUTPATIENT)
Dept: LAB | Facility: CLINIC | Age: 63
End: 2023-03-02
Payer: COMMERCIAL

## 2023-03-02 DIAGNOSIS — R04.0 EPISTAXIS: ICD-10-CM

## 2023-03-02 DIAGNOSIS — L97.512 NON-PRESSURE CHRONIC ULCER OF OTHER PART OF RIGHT FOOT WITH FAT LAYER EXPOSED (H): ICD-10-CM

## 2023-03-02 LAB
AST SERPL W P-5'-P-CCNC: 46 U/L (ref 10–50)
BASOPHILS # BLD AUTO: 0 10E3/UL (ref 0–0.2)
BASOPHILS NFR BLD AUTO: 1 %
BUN SERPL-MCNC: 18.4 MG/DL (ref 8–23)
CREAT SERPL-MCNC: 1.24 MG/DL (ref 0.67–1.17)
CRP SERPL-MCNC: 35.65 MG/L
EOSINOPHIL # BLD AUTO: 0.1 10E3/UL (ref 0–0.7)
EOSINOPHIL NFR BLD AUTO: 6 %
ERYTHROCYTE [DISTWIDTH] IN BLOOD BY AUTOMATED COUNT: 15.7 % (ref 10–15)
ERYTHROCYTE [SEDIMENTATION RATE] IN BLOOD BY WESTERGREN METHOD: 1 MM/HR (ref 0–20)
GFR SERPL CREATININE-BSD FRML MDRD: 67 ML/MIN/1.73M2
HCT VFR BLD AUTO: 37 % (ref 40–53)
HGB BLD-MCNC: 11.9 G/DL (ref 13.3–17.7)
IMM GRANULOCYTES # BLD: 0 10E3/UL
IMM GRANULOCYTES NFR BLD: 1 %
LYMPHOCYTES # BLD AUTO: 0.6 10E3/UL (ref 0.8–5.3)
LYMPHOCYTES NFR BLD AUTO: 30 %
MCH RBC QN AUTO: 27.1 PG (ref 26.5–33)
MCHC RBC AUTO-ENTMCNC: 32.2 G/DL (ref 31.5–36.5)
MCV RBC AUTO: 84 FL (ref 78–100)
MONOCYTES # BLD AUTO: 0.1 10E3/UL (ref 0–1.3)
MONOCYTES NFR BLD AUTO: 5 %
NEUTROPHILS # BLD AUTO: 1.2 10E3/UL (ref 1.6–8.3)
NEUTROPHILS NFR BLD AUTO: 57 %
NRBC # BLD AUTO: 0 10E3/UL
NRBC BLD AUTO-RTO: 0 /100
PLATELET # BLD AUTO: 152 10E3/UL (ref 150–450)
RBC # BLD AUTO: 4.39 10E6/UL (ref 4.4–5.9)
WBC # BLD AUTO: 2.1 10E3/UL (ref 4–11)

## 2023-03-02 PROCEDURE — 86140 C-REACTIVE PROTEIN: CPT | Performed by: INTERNAL MEDICINE

## 2023-03-02 PROCEDURE — 84520 ASSAY OF UREA NITROGEN: CPT | Performed by: INTERNAL MEDICINE

## 2023-03-02 PROCEDURE — 82565 ASSAY OF CREATININE: CPT | Performed by: INTERNAL MEDICINE

## 2023-03-02 PROCEDURE — 84450 TRANSFERASE (AST) (SGOT): CPT | Performed by: INTERNAL MEDICINE

## 2023-03-02 PROCEDURE — 85014 HEMATOCRIT: CPT | Performed by: INTERNAL MEDICINE

## 2023-03-02 PROCEDURE — 85652 RBC SED RATE AUTOMATED: CPT | Performed by: INTERNAL MEDICINE

## 2023-03-03 RX ORDER — MUPIROCIN 20 MG/G
OINTMENT TOPICAL
Qty: 30 G | Refills: 1 | Status: SHIPPED | OUTPATIENT
Start: 2023-03-03 | End: 2023-08-21

## 2023-03-03 NOTE — TELEPHONE ENCOUNTER
Signed Prescriptions:                        Disp   Refills    mupirocin (BACTROBAN) 2 % external [id number] g   1        Sig: Apply a small amount to affected nares 2 times a day           for one month.  Authorizing Provider: PATSY PECK  Ordering User: BABITA CUENCA RN on 3/3/2023 at 11:41 AM

## 2023-03-06 ENCOUNTER — LAB REQUISITION (OUTPATIENT)
Dept: LAB | Facility: CLINIC | Age: 63
End: 2023-03-06
Payer: COMMERCIAL

## 2023-03-06 DIAGNOSIS — L97.512 NON-PRESSURE CHRONIC ULCER OF OTHER PART OF RIGHT FOOT WITH FAT LAYER EXPOSED (H): ICD-10-CM

## 2023-03-06 LAB
BASOPHILS # BLD AUTO: 0 10E3/UL (ref 0–0.2)
BASOPHILS NFR BLD AUTO: 1 %
EOSINOPHIL # BLD AUTO: 0.1 10E3/UL (ref 0–0.7)
EOSINOPHIL NFR BLD AUTO: 8 %
ERYTHROCYTE [DISTWIDTH] IN BLOOD BY AUTOMATED COUNT: 15.2 % (ref 10–15)
HCT VFR BLD AUTO: 34.9 % (ref 40–53)
HGB BLD-MCNC: 11.2 G/DL (ref 13.3–17.7)
IMM GRANULOCYTES # BLD: 0 10E3/UL
IMM GRANULOCYTES NFR BLD: 0 %
LYMPHOCYTES # BLD AUTO: 0.7 10E3/UL (ref 0.8–5.3)
LYMPHOCYTES NFR BLD AUTO: 46 %
MCH RBC QN AUTO: 27.3 PG (ref 26.5–33)
MCHC RBC AUTO-ENTMCNC: 32.1 G/DL (ref 31.5–36.5)
MCV RBC AUTO: 85 FL (ref 78–100)
MONOCYTES # BLD AUTO: 0.1 10E3/UL (ref 0–1.3)
MONOCYTES NFR BLD AUTO: 5 %
NEUTROPHILS # BLD AUTO: 0.6 10E3/UL (ref 1.6–8.3)
NEUTROPHILS NFR BLD AUTO: 40 %
NRBC # BLD AUTO: 0 10E3/UL
NRBC BLD AUTO-RTO: 0 /100
PLATELET # BLD AUTO: 197 10E3/UL (ref 150–450)
RBC # BLD AUTO: 4.1 10E6/UL (ref 4.4–5.9)
WBC # BLD AUTO: 1.5 10E3/UL (ref 4–11)

## 2023-03-06 PROCEDURE — 85025 COMPLETE CBC W/AUTO DIFF WBC: CPT | Performed by: INTERNAL MEDICINE

## 2023-03-08 ENCOUNTER — OFFICE VISIT (OUTPATIENT)
Dept: PODIATRY | Facility: CLINIC | Age: 63
End: 2023-03-08
Payer: COMMERCIAL

## 2023-03-08 VITALS
WEIGHT: 226 LBS | SYSTOLIC BLOOD PRESSURE: 132 MMHG | BODY MASS INDEX: 31.64 KG/M2 | HEIGHT: 71 IN | DIASTOLIC BLOOD PRESSURE: 68 MMHG

## 2023-03-08 DIAGNOSIS — L97.512 RIGHT FOOT ULCER, WITH FAT LAYER EXPOSED (H): Primary | ICD-10-CM

## 2023-03-08 PROCEDURE — 99024 POSTOP FOLLOW-UP VISIT: CPT | Performed by: PODIATRIST

## 2023-03-08 PROCEDURE — 11042 DBRDMT SUBQ TIS 1ST 20SQCM/<: CPT | Mod: 58 | Performed by: PODIATRIST

## 2023-03-08 NOTE — PROGRESS NOTES
[facility]  CLINIC NOTE    CHIEF COMPLAINT:  right foot    PATIENT HISTORY:  Crispin Rojas is a 62 year old male who follows up for right foot. Had third metatarsal head excision and fourth metatarsal osteotomy on 1/19/23. He's been on IV abx per ID due to possible residual osteomyelitis that was pseudomonas resistant to all oral options. He's now being transition to oral abx   -He states since last appt ulcer has been dry and has not been draining since Sunday. States that he still wears his boot primarily, but in regular shoe today. Has been follow up with ID. Has been feeling, weak, fatigued and loss of appetite. No fevers or chills. Ginna pain to right foot.     Review of Systems:  A 10 point review of systems was performed and is positive for that noted above in the patient history.  All other areas are negative.     PAST MEDICAL HISTORY:   Past Medical History:   Diagnosis Date     Ascending aorta dilatation (H)      Cerebral infarction (H)     2010     Gastroesophageal reflux disease with esophagitis      H/O blood clots 2022     Hyperlipidemia LDL goal <70      Hypertension      Nonalcoholic steatohepatitis 11/30/2022     Numbness and tingling      Obesity      GILA (obstructive sleep apnea) 10/22/2018    CPAP intolerant     PVD (peripheral vascular disease) (H)     s/p left partial toe amputation     Renal stone      Tremor      Type 2 diabetes mellitus with diabetic polyneuropathy, with long-term current use of insulin (H)         PAST SURGICAL HISTORY:   Past Surgical History:   Procedure Laterality Date     AMPUTATE FOOT Left 8/18/2016    Procedure: AMPUTATE FOOT;  Surgeon: Jack Younger DPM;  Location: [facility]     AMPUTATE TOE(S) Right 2/1/2016    Procedure: AMPUTATE TOE(S);  Surgeon: Rachelle Manriquez DPM, Pod;  Location: [facility]     AMPUTATE TOE(S) Right 8/2/2019    Procedure: Right fourth toe partial amputation for treatment of osteomyelitis.;  Surgeon: Aren  Jack HOPKINS DPM;  Location: [facility]     AMPUTATE TOE(S) Left 11/8/2019    Procedure: Left second toe amputation at the metatarsophalangeal joint.;  Surgeon: Rachelle Manriquez DPM, Podiatry/Foot and Ankle Surgery;  Location: [facility]     AMPUTATE TOE(S) Right 6/5/2022    Procedure: AMPUTATION OF RIGHT GREAT TOE;  Surgeon: Wili Quiles DPM;  Location: [facility]     AMPUTATE TOE(S) Right 7/28/2022    Procedure: Right foot partial first metatarsal resection;  Surgeon: Tiny Soto DPM;  Location: [facility]     ANGIOGRAM       BIOPSY BONE FOOT Right 7/24/2022    Procedure: Bone biopsy, right first metatarsal.;  Surgeon: Valentino Molina DPM;  Location: [facility]     COLONOSCOPY       COMBINED CYSTOSCOPY, RETROGRADES, URETEROSCOPY, INSERT STENT Left 8/21/2016    Procedure: COMBINED CYSTOSCOPY, RETROGRADES, URETEROSCOPY, INSERT STENT;  Surgeon: Artemio Valenzuela MD;  Location: [facility]     COMBINED CYSTOSCOPY, RETROGRADES, URETEROSCOPY, LASER HOLMIUM LITHOTRIPSY URETER(S), INSERT STENT Left 10/3/2016    Procedure: COMBINED CYSTOSCOPY, RETROGRADES, URETEROSCOPY, LASER HOLMIUM LITHOTRIPSY URETER(S), INSERT STENT;  Surgeon: Artemio Valenzuela MD;  [facility]     EXTRACORPOREAL SHOCK WAVE LITHOTRIPSY (ESWL) Left 9/8/2016    Procedure: EXTRACORPOREAL SHOCK WAVE LITHOTRIPSY (ESWL);  Surgeon: Artemio Valenzuela MD;  Location: [facility]     INCISION AND DRAINAGE FOOT, COMBINED Right 7/24/2022    Procedure: Incision and drainage, right foot ;  Surgeon: Valentino Molina DPM;  [facility]     OSTEOTOMY FOOT Right 11/30/2022    Procedure: 1. Right second metatarsal floating osteotomy  2. Right second digit proximal phalanx arthroplasty 3. Right percutaneous flexor tenotomy;  Surgeon: Tiny Soto DPM;  [facility]     OSTEOTOMY FOOT Right 1/19/2023    Procedure: Right foot third metatarsal head excision, ulcer debridement, matatarsal osteotomies;  Surgeon: Tiny Soto DPM;  [facility]      RECESSION GASTROCNEMIUS Right 2/1/2016    Procedure: RECESSION GASTROCNEMIUS;  Surgeon: Rachelle Manriquez DPM, Pod;  [facility]        MEDICATIONS:  Reviewed in Epic.     ALLERGIES:    Allergies   Allergen Reactions     Hmg-Coa-R Inhibitors Swelling     Other reaction(s): Other (see comments)  SIMCOR caused edema in extremities       Bactrim [Sulfamethoxazole W/Trimethoprim] Nausea and Vomiting     Sulfa Drugs Nausea     Niacin Swelling     SIMCOR caused edema in extremities     Simvastatin Swelling     SIMCOR caused edema in extremities        SOCIAL HISTORY:   Social History     Socioeconomic History     Marital status:      Spouse name: Sydney     Number of children: 2     Years of education: Not on file     Highest education level: Master's degree (e.g., MA, MS, Arleth, MEd, MSW, ELIANA)   Occupational History     Occupation: marketing     Employer: [employer]     Comment: [employer]   Tobacco Use     Smoking status: Never     Smokeless tobacco: Never   Vaping Use     Vaping Use: Never used   Substance and Sexual Activity     Alcohol use: Yes     Comment: rare---red wine 3x per month     Drug use: Never     Sexual activity: Not Currently     Partners: Female   Other Topics Concern     Parent/sibling w/ CABG, MI or angioplasty before 65F 55M? No   Social History Narrative     Not on file     Social Determinants of Health     Financial Resource Strain: Low Risk      Difficulty of Paying Living Expenses: Not very hard   Food Insecurity: No Food Insecurity     Worried About Running Out of Food in the Last Year: Never true     Ran Out of Food in the Last Year: Never true   Transportation Needs: No Transportation Needs     Lack of Transportation (Medical): No     Lack of Transportation (Non-Medical): No   Physical Activity: Sufficiently Active     Days of Exercise per Week: 5 days     Minutes of Exercise per Session: 50 min   Stress: No Stress Concern Present     Feeling of Stress : Not at all   Social  "Connections: Moderately Integrated     Frequency of Communication with Friends and Family: Twice a week     Frequency of Social Gatherings with Friends and Family: Never     Attends Church Services: More than 4 times per year     Active Member of Clubs or Organizations: Yes     Attends Club or Organization Meetings: Not on file     Marital Status:    Intimate Partner Violence: Not on file   Housing Stability: Low Risk      Unable to Pay for Housing in the Last Year: No     Number of Places Lived in the Last Year: 2     Unstable Housing in the Last Year: No        FAMILY HISTORY:   Family History   Problem Relation Age of Onset     Arthritis Mother         SLE     Connective Tissue Disorder Mother         lupus     Diabetes Mother      Cerebrovascular Disease Mother      Cancer Mother      Diabetes Father      Coronary Artery Disease Father      Chronic Obstructive Pulmonary Disease Father      Diabetes Sister      Diabetes Maternal Grandfather         EXAM:Vitals: /68   Ht 1.803 m (5' 11\")   Wt 102.5 kg (226 lb)   BMI 31.52 kg/m    BMI= Body mass index is 31.52 kg/m .      General appearance: Patient is alert and fully cooperative with history & exam.  No sign of distress is noted during the visit.      Respiratory: Breathing is regular & unlabored while sitting.      HEENT: Hearing is intact to spoken word.  Speech is clear.  No gross evidence of visual impairment that would impact ambulation.      Dermatologic:  Right foot: submet three ulcer measures 1.5 x .5 x .1 cm. Similar in size compared to last appt. More granular. No probe to bone. No cellulitis.     Left foot submet 3 callus/preuclerative lesion, debrided to level of dermis. No open lesions or cellulitis. Preulcerative      Vascular: Dorsalis pedis and posterior tibial pulses are intact & regular bilaterally.  CFT and skin temperature is normal to both lower extremities.       Neurologic: Lower extremity sensation is diminished, " bilateral foot, to light touch.  No evidence of neurological-based weakness or contracture in the lower extremities.       Musculoskeletal: Patient is ambulatory without an assistive device or brace.  S/p right first ray resection. Right 2nd digit --> now more rectus.   S/p left hallux and second digit amputations     Psychiatric: Affect is pleasant & appropriate.      Cultures:     Foot, Right; Tissue    0 Result Notes  Culture 2+ Pseudomonas aeruginosa Abnormal        Isolated in broth only Enterococcus faecalis Abnormal     On day 1 of incubation   1+ Normal josé            Resulting Agency: [facility]     Susceptibility     Pseudomonas aeruginosa Enterococcus faecalis     JEFFREY JEFFREY     Amikacin <=2 ug/mL Susceptible       Ampicillin   <=2 ug/mL Susceptible     Cefepime <=1 ug/mL Susceptible       Ceftazidime <=1 ug/mL Susceptible       Ciprofloxacin 2 ug/mL Resistant       Gentamicin <=1 ug/mL Susceptible       Gentamicin Synergy   Susceptible... Susceptible 1     Levofloxacin >=8 ug/mL Resistant       Meropenem 1 ug/mL Susceptible       Penicillin   2 ug/mL Susceptible     Piperacillin/Tazobactam <=4 ug/mL Susceptible       Tobramycin <=1 ug/mL Susceptible       Vancomycin   1 ug/mL Susceptible                 Foot, Right; Tissue    0 Result Notes  Culture 4+ Bacteroides fragilis Abnormal     Susceptibilities not routinely done   2+ Anaerococcus species Abnormal     Susceptibilities not routinely done        Pathology:     Final Diagnosis   Right third toe, metatarsal head, amputation-  -Bone with changes consistent with extensive acute osteomyelitis.                 PROCEDURE:   Verbal consent was obtained for debridement. A 15 blade was used to excise the nonivable tissue to the ulcer, down to and including subcutaneous tissue. No noted purulence afterwards. Debrided site measures 2.0 cm x 1. x .1 cm. No probe to bone. Well tolerated. Site was cleaned with alcohol.  (right foot)     Verbal consent was obtained for  debridement. A 15 blade was used to excise the nonivable tissue to the ulcer, down to and including dermis. No noted purulence afterwards. Debrided site measures 2.0 cm x 2 x .1 cm. No probe to bone. Well tolerated. Site was cleaned with alcohol.  (left foot)     Sed rate: 65 --> 44 --> 33 --> 26 --> 1  CRP: 34 --> 7 --> 4 --> 5 --> 10.45  WBC: 4.6 --> 2.6 --> 1.6      ASSESSMENT:  1. S/p right foot third head resection, fourth metatarsal floating osteotomy, ulcer debridement  --> being treated for residual osteomyelitis, 1/19/23   2. Left foot submet three preulcerative lesion  3. Neutropenia --> following with ID      MEDICAL DECISION MAKING:   -Right foot much overall without signs of infection. Ulcer similar in appearance to last week. Excisional debridement done to site.   -Plan changed to every other day collagen applications. Provide info on wear to obtain dressing product  -Cont to wear boot when walking more, shoe okay when walking less.   -Patient's labs reviewed. Encouraged continued evaluation with ID. Difficult to determine cause of neutropenia, may be related to the right foot, but no clinical signs of infection   -Cont to monitor sites  -F/u in 3 weeks         Tiny Soto DPM   [facility] of Pod[facility]/Foot & Ankle Surgery    High complexity patient

## 2023-03-08 NOTE — LETTER
3/8/2023         RE: Crispin Rojas  [address] [facility] [address]        Dear Colleague,    Thank you for referring your patient, Crispin Rojas, to the [facility]. Please see a copy of my visit note below.    [facility]  CLINIC NOTE    CHIEF COMPLAINT:  right foot    PATIENT HISTORY:  Crispin Rojas is a 62 year old male who follows up for right foot. Had third metatarsal head excision and fourth metatarsal osteotomy on 1/19/23. He's been on IV abx per ID due to possible residual osteomyelitis that was pseudomonas resistant to all oral options. He's now being transition to oral abx   -He states since last appt ulcer has been dry and has not been draining since Sunday. States that he still wears his boot primarily, but in regular shoe today. Has been follow up with ID. Has been feeling, weak, fatigued and loss of appetite. No fevers or chills. Ginna pain to right foot.     Review of Systems:  A 10 point review of systems was performed and is positive for that noted above in the patient history.  All other areas are negative.     PAST MEDICAL HISTORY:   Past Medical History:   Diagnosis Date     Ascending aorta dilatation (H)      Cerebral infarction (H)     2010     Gastroesophageal reflux disease with esophagitis      H/O blood clots 2022     Hyperlipidemia LDL goal <70      Hypertension      Nonalcoholic steatohepatitis 11/30/2022     Numbness and tingling      Obesity      GILA (obstructive sleep apnea) 10/22/2018    CPAP intolerant     PVD (peripheral vascular disease) (H)     s/p left partial toe amputation     Renal stone      Tremor      Type 2 diabetes mellitus with diabetic polyneuropathy, with long-term current use of insulin (H)         PAST SURGICAL HISTORY:   Past Surgical History:   Procedure Laterality Date     AMPUTATE FOOT Left 8/18/2016    Procedure: AMPUTATE FOOT;  Surgeon: Jack Younger DPM;  Location:  OR      AMPUTATE TOE(S) Right 2/1/2016    Procedure: AMPUTATE TOE(S);  Surgeon: Rachelle Manriquez DPM, Pod;  [facility]     AMPUTATE TOE(S) Right 8/2/2019    Procedure: Right fourth toe partial amputation for treatment of osteomyelitis.;  Surgeon: Jack Younger DPM;  [facility]     AMPUTATE TOE(S) Left 11/8/2019    Procedure: Left second toe amputation at the metatarsophalangeal joint.;  Surgeon: Rachelle Manriquez DPM, Podiatry/Foot and Ankle Surgery;  Location: [facility]     AMPUTATE TOE(S) Right 6/5/2022    Procedure: AMPUTATION OF RIGHT GREAT TOE;  Surgeon: Wili Quiles DPM;  [facility]: [facility]     AMPUTATE TOE(S) Right 7/28/2022    Procedure: Right foot partial first metatarsal resection;  Surgeon: Tiny Soto DPM;  Location: [facility]     ANGIOGRAM       BIOPSY BONE FOOT Right 7/24/2022    Procedure: Bone biopsy, right first metatarsal.;  Surgeon: Valentino Molina DPM;  [facility]: [facility]     COLONOSCOPY       COMBINED CYSTOSCOPY, RETROGRADES, URETEROSCOPY, INSERT STENT Left 8/21/2016    Procedure: COMBINED CYSTOSCOPY, RETROGRADES, URETEROSCOPY, INSERT STENT;  Surgeon: Artemio Valenzuela MD;  [facility]     COMBINED CYSTOSCOPY, RETROGRADES, URETEROSCOPY, LASER HOLMIUM LITHOTRIPSY URETER(S), INSERT STENT Left 10/3/2016    Procedure: COMBINED CYSTOSCOPY, RETROGRADES, URETEROSCOPY, LASER HOLMIUM LITHOTRIPSY URETER(S), INSERT STENT;  Surgeon: Artemio Valenzuela MD;  [facility]     EXTRACORPOREAL SHOCK WAVE LITHOTRIPSY (ESWL) Left 9/8/2016    Procedure: EXTRACORPOREAL SHOCK WAVE LITHOTRIPSY (ESWL);  Surgeon: Artemio Valenzuela MD;  [facility]: [facility]     INCISION AND DRAINAGE FOOT, COMBINED Right 7/24/2022    Procedure: Incision and drainage, right foot ;  Surgeon: Valentino Molina DPM;  [facility]     OSTEOTOMY FOOT Right 11/30/2022    Procedure: 1. Right second metatarsal floating osteotomy  2. Right second digit proximal phalanx arthroplasty 3. Right percutaneous flexor  tenotomy;  Surgeon: Tiny Soto DPM;  [facility]     OSTEOTOMY FOOT Right 1/19/2023    Procedure: Right foot third metatarsal head excision, ulcer debridement, matatarsal osteotomies;  Surgeon: Tiny Soto DPM;  [facility]     RECESSION GASTROCNEMIUS Right 2/1/2016    Procedure: RECESSION GASTROCNEMIUS;  Surgeon: Rachelle Manriquez DPM, Pod;  [facility]        MEDICATIONS:  Reviewed in Epic.     ALLERGIES:    Allergies   Allergen Reactions     Hmg-Coa-R Inhibitors Swelling     Other reaction(s): Other (see comments)  SIMCOR caused edema in extremities       Bactrim [Sulfamethoxazole W/Trimethoprim] Nausea and Vomiting     Sulfa Drugs Nausea     Niacin Swelling     SIMCOR caused edema in extremities     Simvastatin Swelling     SIMCOR caused edema in extremities        SOCIAL HISTORY:   Social History     Socioeconomic History     Marital status:      Spouse name: Sydney     Number of children: 2     Years of education: Not on file     Highest education level: Master's degree (e.g., MA, MS, Arleth, MEd, MSW, ELIANA)   Occupational History     Occupation: marketing     Employer: [employer]     Comment: [employer]   Tobacco Use     Smoking status: Never     Smokeless tobacco: Never   Vaping Use     Vaping Use: Never used   Substance and Sexual Activity     Alcohol use: Yes     Comment: rare---red wine 3x per month     Drug use: Never     Sexual activity: Not Currently     Partners: Female   Other Topics Concern     Parent/sibling w/ CABG, MI or angioplasty before 65F 55M? No   Social History Narrative     Not on file     Social Determinants of Health     Financial Resource Strain: Low Risk      Difficulty of Paying Living Expenses: Not very hard   Food Insecurity: No Food Insecurity     Worried About Running Out of Food in the Last Year: Never true     Ran Out of Food in the Last Year: Never true   Transportation Needs: No Transportation Needs     Lack of Transportation (Medical): No      "Lack of Transportation (Non-Medical): No   Physical Activity: Sufficiently Active     Days of Exercise per Week: 5 days     Minutes of Exercise per Session: 50 min   Stress: No Stress Concern Present     Feeling of Stress : Not at all   Social Connections: Moderately Integrated     Frequency of Communication with Friends and Family: Twice a week     Frequency of Social Gatherings with Friends and Family: Never     Attends Cheondoism Services: More than 4 times per year     Active Member of Clubs or Organizations: Yes     Attends Club or Organization Meetings: Not on file     Marital Status:    Intimate Partner Violence: Not on file   Housing Stability: Low Risk      Unable to Pay for Housing in the Last Year: No     Number of Places Lived in the Last Year: 2     Unstable Housing in the Last Year: No        FAMILY HISTORY:   Family History   Problem Relation Age of Onset     Arthritis Mother         SLE     Connective Tissue Disorder Mother         lupus     Diabetes Mother      Cerebrovascular Disease Mother      Cancer Mother      Diabetes Father      Coronary Artery Disease Father      Chronic Obstructive Pulmonary Disease Father      Diabetes Sister      Diabetes Maternal Grandfather         EXAM:Vitals: /68   Ht 1.803 m (5' 11\")   Wt 102.5 kg (226 lb)   BMI 31.52 kg/m    BMI= Body mass index is 31.52 kg/m .      General appearance: Patient is alert and fully cooperative with history & exam.  No sign of distress is noted during the visit.      Respiratory: Breathing is regular & unlabored while sitting.      HEENT: Hearing is intact to spoken word.  Speech is clear.  No gross evidence of visual impairment that would impact ambulation.      Dermatologic:  Right foot: submet three ulcer measures 1.5 x .5 x .1 cm. Similar in size compared to last appt. More granular. No probe to bone. No cellulitis.     Left foot submet 3 callus/preuclerative lesion, debrided to level of dermis. No open lesions or " cellulitis. Preulcerative      Vascular: Dorsalis pedis and posterior tibial pulses are intact & regular bilaterally.  CFT and skin temperature is normal to both lower extremities.       Neurologic: Lower extremity sensation is diminished, bilateral foot, to light touch.  No evidence of neurological-based weakness or contracture in the lower extremities.       Musculoskeletal: Patient is ambulatory without an assistive device or brace.  S/p right first ray resection. Right 2nd digit --> now more rectus.   S/p left hallux and second digit amputations     Psychiatric: Affect is pleasant & appropriate.      Cultures:     Foot, Right; Tissue    0 Result Notes  Culture 2+ Pseudomonas aeruginosa Abnormal        Isolated in broth only Enterococcus faecalis Abnormal     On day 1 of incubation   1+ Normal josé            Resulting Agency: [facility]     Susceptibility     Pseudomonas aeruginosa Enterococcus faecalis     JEFFREY JEFFREY     Amikacin <=2 ug/mL Susceptible       Ampicillin   <=2 ug/mL Susceptible     Cefepime <=1 ug/mL Susceptible       Ceftazidime <=1 ug/mL Susceptible       Ciprofloxacin 2 ug/mL Resistant       Gentamicin <=1 ug/mL Susceptible       Gentamicin Synergy   Susceptible... Susceptible 1     Levofloxacin >=8 ug/mL Resistant       Meropenem 1 ug/mL Susceptible       Penicillin   2 ug/mL Susceptible     Piperacillin/Tazobactam <=4 ug/mL Susceptible       Tobramycin <=1 ug/mL Susceptible       Vancomycin   1 ug/mL Susceptible                 Foot, Right; Tissue    0 Result Notes  Culture 4+ Bacteroides fragilis Abnormal     Susceptibilities not routinely done   2+ Anaerococcus species Abnormal     Susceptibilities not routinely done        Pathology:     Final Diagnosis   Right third toe, metatarsal head, amputation-  -Bone with changes consistent with extensive acute osteomyelitis.                 PROCEDURE:   Verbal consent was obtained for debridement. A 15 blade was used to excise the nonivable tissue to the  ulcer, down to and including subcutaneous tissue. No noted purulence afterwards. Debrided site measures 2.0 cm x 1. x .1 cm. No probe to bone. Well tolerated. Site was cleaned with alcohol.  (right foot)     Verbal consent was obtained for debridement. A 15 blade was used to excise the nonivable tissue to the ulcer, down to and including dermis. No noted purulence afterwards. Debrided site measures 2.0 cm x 2 x .1 cm. No probe to bone. Well tolerated. Site was cleaned with alcohol.  (left foot)     Sed rate: 65 --> 44 --> 33 --> 26 --> 1  CRP: 34 --> 7 --> 4 --> 5 --> 10.45  WBC: 4.6 --> 2.6 --> 1.6      ASSESSMENT:  1. S/p right foot third head resection, fourth metatarsal floating osteotomy, ulcer debridement  --> being treated for residual osteomyelitis, 1/19/23   2. Left foot submet three preulcerative lesion  3. Neutropenia --> following with ID      MEDICAL DECISION MAKING:   -Right foot much overall without signs of infection. Ulcer similar in appearance to last week. Excisional debridement done to site.   -Plan changed to every other day collagen applications. Provide info on wear to obtain dressing product  -Cont to wear boot when walking more, shoe okay when walking less.   -Patient's labs reviewed. Encouraged continued evaluation with ID. Difficult to determine cause of neutropenia, may be related to the right foot, but no clinical signs of infection   -Cont to monitor sites  -F/u in 3 weeks         Tiny Soto DPM   [place] Department of Podiatry/Foot & Ankle Surgery    High complexity patient               Again, thank you for allowing me to participate in the care of your patient.        Sincerely,        Tiny Soto DPM

## 2023-03-08 NOTE — PATIENT INSTRUCTIONS
Thank you for choosing [facility] Podiatry / Foot & Ankle Surgery!    DR. SAGE'S CLINIC:  [facility] SCHEDULE SURGERY: [phone]   [address] BILLING QUESTIONS: [phone]   [place] [address] APPOINTMENTS: [phone]   PH: [phone] [facility] LINE:[phone]   FAX: [phone]      Follow up:   - three weeks     Next steps:   -Cont to follow up with infectious disease and discuss current lab work and symptoms  -Buy product circled on sheets. Apply small piece to red wound bed every other day. Cover with 4x4s and gauze wrap  -Transition to regular shoes when walking short distances. Cont to wear boot when walking long distances   -Call the office with any questions       Flu vaccines are now available at all [facility] clinics and retail pharmacies across the [facility]. Appointments are required for clinic locations. To schedule an appointment online, please log into [employer] or create an account if you are a new user. You can also call [phone], or simply walk in at one of the [facility] retail pharmacy locations.

## 2023-03-10 ENCOUNTER — LAB (OUTPATIENT)
Dept: LAB | Facility: CLINIC | Age: 63
End: 2023-03-10
Payer: COMMERCIAL

## 2023-03-10 ENCOUNTER — MEDICAL CORRESPONDENCE (OUTPATIENT)
Dept: HEALTH INFORMATION MANAGEMENT | Facility: CLINIC | Age: 63
End: 2023-03-10

## 2023-03-10 DIAGNOSIS — M86.171 ACUTE OSTEOMYELITIS OF RIGHT ANKLE OR FOOT (H): ICD-10-CM

## 2023-03-10 LAB
AST SERPL W P-5'-P-CCNC: 51 U/L (ref 10–50)
BASOPHILS # BLD AUTO: 0 10E3/UL (ref 0–0.2)
BASOPHILS NFR BLD AUTO: 1 %
CREAT SERPL-MCNC: 0.86 MG/DL (ref 0.67–1.17)
CRP SERPL-MCNC: 6.33 MG/L
EOSINOPHIL # BLD AUTO: 0.2 10E3/UL (ref 0–0.7)
EOSINOPHIL NFR BLD AUTO: 4 %
ERYTHROCYTE [DISTWIDTH] IN BLOOD BY AUTOMATED COUNT: 15 % (ref 10–15)
ERYTHROCYTE [SEDIMENTATION RATE] IN BLOOD BY WESTERGREN METHOD: 37 MM/HR (ref 0–20)
GFR SERPL CREATININE-BSD FRML MDRD: >90 ML/MIN/1.73M2
HCT VFR BLD AUTO: 35.1 % (ref 40–53)
HGB BLD-MCNC: 10.9 G/DL (ref 13.3–17.7)
IMM GRANULOCYTES # BLD: 0 10E3/UL
IMM GRANULOCYTES NFR BLD: 1 %
LYMPHOCYTES # BLD AUTO: 1.3 10E3/UL (ref 0.8–5.3)
LYMPHOCYTES NFR BLD AUTO: 34 %
MCH RBC QN AUTO: 26.8 PG (ref 26.5–33)
MCHC RBC AUTO-ENTMCNC: 31.1 G/DL (ref 31.5–36.5)
MCV RBC AUTO: 86 FL (ref 78–100)
MONOCYTES # BLD AUTO: 0.3 10E3/UL (ref 0–1.3)
MONOCYTES NFR BLD AUTO: 7 %
NEUTROPHILS # BLD AUTO: 2.1 10E3/UL (ref 1.6–8.3)
NEUTROPHILS NFR BLD AUTO: 53 %
NRBC # BLD AUTO: 0 10E3/UL
NRBC BLD AUTO-RTO: 0 /100
PLATELET # BLD AUTO: 224 10E3/UL (ref 150–450)
RBC # BLD AUTO: 4.07 10E6/UL (ref 4.4–5.9)
WBC # BLD AUTO: 4 10E3/UL (ref 4–11)

## 2023-03-10 PROCEDURE — 85004 AUTOMATED DIFF WBC COUNT: CPT

## 2023-03-10 PROCEDURE — 36415 COLL VENOUS BLD VENIPUNCTURE: CPT

## 2023-03-10 PROCEDURE — 85652 RBC SED RATE AUTOMATED: CPT

## 2023-03-10 PROCEDURE — 86140 C-REACTIVE PROTEIN: CPT

## 2023-03-10 PROCEDURE — 82565 ASSAY OF CREATININE: CPT

## 2023-03-10 PROCEDURE — 84450 TRANSFERASE (AST) (SGOT): CPT

## 2023-03-15 ENCOUNTER — TELEPHONE (OUTPATIENT)
Dept: PODIATRY | Facility: CLINIC | Age: 63
End: 2023-03-15
Payer: COMMERCIAL

## 2023-03-15 NOTE — TELEPHONE ENCOUNTER
Reason for Call:  Form, our goal is to have forms completed with 7 days, however, some forms may require a visit or additional information.     Type of letter, form or note:  orthotics order      Who is the form from?: [facility] [facility]     Where did the form come from: form was faxed in     Phone number of person requesting form: [phone]  Can we leave a detailed message on this number:  YES     Desired completion date of form: ASAP       How will form be returned?:  fax to [phone]     Has the patient signed a consent form for release of information (may be included with form)? Not Applicable     Additional comments:

## 2023-03-21 ENCOUNTER — TELEPHONE (OUTPATIENT)
Dept: OTOLARYNGOLOGY | Facility: CLINIC | Age: 63
End: 2023-03-21
Payer: COMMERCIAL

## 2023-03-21 NOTE — TELEPHONE ENCOUNTER
LVM for patient regarding Provider out of clinic and appointment will need rescheduling. Provided ENT Clinic and direct number for rescheduling needs.

## 2023-03-23 ENCOUNTER — TELEPHONE (OUTPATIENT)
Dept: FAMILY MEDICINE | Facility: CLINIC | Age: 63
End: 2023-03-23
Payer: COMMERCIAL

## 2023-03-23 NOTE — TELEPHONE ENCOUNTER
Summary:    Patient is due/failing the following:   Eye exam    Reviewed:    [] CARE EVERYWHERE  [] LAST OV NOTE   [] FYI TAB  [] MYCHART ACTIVE?  [] LAST PANEL ENCOUNTER  [] FUTURE APPTS  [] IMMUNIZATIONS  [] Media Tab            Action needed:   Patient needs office visit for post office.    Type of outreach:    received eye exam report from eye clinic, will send to abstraction                                                                               Shania Warren/KOBI  [place]

## 2023-03-24 ENCOUNTER — TELEPHONE (OUTPATIENT)
Dept: OTOLARYNGOLOGY | Facility: CLINIC | Age: 63
End: 2023-03-24
Payer: COMMERCIAL

## 2023-03-24 NOTE — TELEPHONE ENCOUNTER
LVM for patient of rescheduled appointment due to provider is out of clinic. Rescheduled patient and provided new appointment information in voice message and sent new reminder letter to address in Chart. Provided ENT Clinic number for any schedule conflicts.     Also sent MyChart Message to patient

## 2023-03-25 ENCOUNTER — HEALTH MAINTENANCE LETTER (OUTPATIENT)
Age: 63
End: 2023-03-25

## 2023-03-29 ENCOUNTER — OFFICE VISIT (OUTPATIENT)
Dept: PODIATRY | Facility: CLINIC | Age: 63
End: 2023-03-29
Payer: COMMERCIAL

## 2023-03-29 VITALS
DIASTOLIC BLOOD PRESSURE: 68 MMHG | WEIGHT: 226 LBS | BODY MASS INDEX: 31.64 KG/M2 | HEIGHT: 71 IN | SYSTOLIC BLOOD PRESSURE: 122 MMHG

## 2023-03-29 DIAGNOSIS — L97.512 RIGHT FOOT ULCER, WITH FAT LAYER EXPOSED (H): Primary | ICD-10-CM

## 2023-03-29 PROCEDURE — 11042 DBRDMT SUBQ TIS 1ST 20SQCM/<: CPT | Mod: 78 | Performed by: PODIATRIST

## 2023-03-29 PROCEDURE — 2894A VOIDCORRECT: CPT | Mod: 25 | Performed by: PODIATRIST

## 2023-03-29 PROCEDURE — 12020 TX SUPFC WND DEHSN SMPL CLSR: CPT | Mod: 78 | Performed by: PODIATRIST

## 2023-03-29 NOTE — LETTER
3/29/2023         RE: Crispin Rojas  [address] [facility] [address]        Dear Colleague,    Thank you for referring your patient, Crispin Rojas, to the [facility]. Please see a copy of my visit note below.    [facility]  CLINIC NOTE    CHIEF COMPLAINT:  right foot    PATIENT HISTORY:  Crispin Rojas is a 62 year old male who follows up for right foot. Had third metatarsal head excision and fourth metatarsal osteotomy on 1/19/23. He's been on IV abx per ID due to possible residual osteomyelitis that was pseudomonas resistant to all oral options. He's now being transition to oral abx. Since last appt, he states he feels better overall, being off abx. States very little drainage to right foot. Denies pain. Denies N/F/V/C/D.  -He states since last appt ulcer has been dry and has not been draining since Sunday. States that he still wears his boot primarily, but in regular shoe today. Has been follow up with ID. Has been feeling, weak, fatigued and loss of appetite. No fevers or chills. Ginna pain to right foot.     Review of Systems:  A 10 point review of systems was performed and is positive for that noted above in the patient history.  All other areas are negative.     PAST MEDICAL HISTORY:   Past Medical History:   Diagnosis Date     Ascending aorta dilatation (H)      Cerebral infarction (H)     2010     Gastroesophageal reflux disease with esophagitis      H/O blood clots 2022     Hyperlipidemia LDL goal <70      Hypertension      Nonalcoholic steatohepatitis 11/30/2022     Numbness and tingling      Obesity      GILA (obstructive sleep apnea) 10/22/2018    CPAP intolerant     PVD (peripheral vascular disease) (H)     s/p left partial toe amputation     Renal stone      Tremor      Type 2 diabetes mellitus with diabetic polyneuropathy, with long-term current use of insulin (H)         PAST SURGICAL HISTORY:   Past Surgical History:    Procedure Laterality Date     AMPUTATE FOOT Left 8/18/2016    Procedure: AMPUTATE FOOT;  Surgeon: Jack Younger DPM;  Location: [facility]     AMPUTATE TOE(S) Right 2/1/2016    Procedure: AMPUTATE TOE(S);  Surgeon: Rachelle Manriquez DPM, Pod;  Location: [facility]     AMPUTATE TOE(S) Right 8/2/2019    Procedure: Right fourth toe partial amputation for treatment of osteomyelitis.;  Surgeon: Jack Younger DPM;  Location: [facility]     AMPUTATE TOE(S) Left 11/8/2019    Procedure: Left second toe amputation at the metatarsophalangeal joint.;  Surgeon: Rachelle Manriquez DPM, Podiatry/Foot and Ankle Surgery;  Location: [facility]     AMPUTATE TOE(S) Right 6/5/2022    Procedure: AMPUTATION OF RIGHT GREAT TOE;  Surgeon: Wili Quiles DPM;  Location: [facility]     AMPUTATE TOE(S) Right 7/28/2022    Procedure: Right foot partial first metatarsal resection;  Surgeon: Tiny Soto DPM;  Location: [facility]     ANGIOGRAM       BIOPSY BONE FOOT Right 7/24/2022    Procedure: Bone biopsy, right first metatarsal.;  Surgeon: Valentino Molina DPM;  Location: [facility]     COLONOSCOPY       COMBINED CYSTOSCOPY, RETROGRADES, URETEROSCOPY, INSERT STENT Left 8/21/2016    Procedure: COMBINED CYSTOSCOPY, RETROGRADES, URETEROSCOPY, INSERT STENT;  Surgeon: Artemio Valenzuela MD;  Location: [facility]     COMBINED CYSTOSCOPY, RETROGRADES, URETEROSCOPY, LASER HOLMIUM LITHOTRIPSY URETER(S), INSERT STENT Left 10/3/2016    Procedure: COMBINED CYSTOSCOPY, RETROGRADES, URETEROSCOPY, LASER HOLMIUM LITHOTRIPSY URETER(S), INSERT STENT;  Surgeon: Artemio Valenzuela MD;  Location: [facility]     EXTRACORPOREAL SHOCK WAVE LITHOTRIPSY (ESWL) Left 9/8/2016    Procedure: EXTRACORPOREAL SHOCK WAVE LITHOTRIPSY (ESWL);  Surgeon: Artemio Valenzuela MD;  Location: [facility]     INCISION AND DRAINAGE FOOT, COMBINED Right 7/24/2022    Procedure: Incision and drainage, right foot ;  Surgeon: Valentino Molina DPM;  Location: [facility]     OSTEOTOMY FOOT Right  11/30/2022    Procedure: 1. Right second metatarsal floating osteotomy  2. Right second digit proximal phalanx arthroplasty 3. Right percutaneous flexor tenotomy;  Surgeon: Tiny Soto DPM;  Location: [facility]     OSTEOTOMY FOOT Right 1/19/2023    Procedure: Right foot third metatarsal head excision, ulcer debridement, matatarsal osteotomies;  Surgeon: Tiny Soto DPM;  Location: [facility]     RECESSION GASTROCNEMIUS Right 2/1/2016    Procedure: RECESSION GASTROCNEMIUS;  Surgeon: Rachelle Manriquez DPM, Pod;  Location: [facility]        MEDICATIONS:  Reviewed in Epic.     ALLERGIES:    Allergies   Allergen Reactions     Hmg-Coa-R Inhibitors Swelling     Other reaction(s): Other (see comments)  SIMCOR caused edema in extremities       Bactrim [Sulfamethoxazole W/Trimethoprim] Nausea and Vomiting     Sulfa Drugs Nausea     Niacin Swelling     SIMCOR caused edema in extremities     Simvastatin Swelling     SIMCOR caused edema in extremities        SOCIAL HISTORY:   Social History     Socioeconomic History     Marital status:      Spouse name: Sydney     Number of children: 2     Years of education: Not on file     Highest education level: Master's degree (e.g., MA, MS, [place], MEd, MSW, ELIANA)   Occupational History     Occupation: marketing     Employer: [employer]     Comment: [employer]   Tobacco Use     Smoking status: Never     Smokeless tobacco: Never   Vaping Use     Vaping Use: Never used   Substance and Sexual Activity     Alcohol use: Yes     Comment: rare---red wine 3x per month     Drug use: Never     Sexual activity: Not Currently     Partners: Female   Other Topics Concern     Parent/sibling w/ CABG, MI or angioplasty before 65F 55M? No   Social History Narrative     Not on file     Social Determinants of Health     Financial Resource Strain: Low Risk      Difficulty of Paying Living Expenses: Not very hard   Food Insecurity: No Food Insecurity     Worried About Running Out of Food in the Last Year:  "Never true     Ran Out of Food in the Last Year: Never true   Transportation Needs: No Transportation Needs     Lack of Transportation (Medical): No     Lack of Transportation (Non-Medical): No   Physical Activity: Sufficiently Active     Days of Exercise per Week: 5 days     Minutes of Exercise per Session: 50 min   Stress: No Stress Concern Present     Feeling of Stress : Not at all   Social Connections: Moderately Integrated     Frequency of Communication with Friends and Family: Twice a week     Frequency of Social Gatherings with Friends and Family: Never     Attends Anabaptism Services: More than 4 times per year     Active Member of Clubs or Organizations: Yes     Attends Club or Organization Meetings: Not on file     Marital Status:    Intimate Partner Violence: Not on file   Housing Stability: Low Risk      Unable to Pay for Housing in the Last Year: No     Number of Places Lived in the Last Year: 2     Unstable Housing in the Last Year: No        FAMILY HISTORY:   Family History   Problem Relation Age of Onset     Arthritis Mother         SLE     Connective Tissue Disorder Mother         lupus     Diabetes Mother      Cerebrovascular Disease Mother      Cancer Mother      Diabetes Father      Coronary Artery Disease Father      Chronic Obstructive Pulmonary Disease Father      Diabetes Sister      Diabetes Maternal Grandfather         EXAM:Vitals: /68   Ht 1.803 m (5' 11\")   Wt 102.5 kg (226 lb)   BMI 31.52 kg/m    BMI= Body mass index is 31.52 kg/m .      General appearance: Patient is alert and fully cooperative with history & exam.  No sign of distress is noted during the visit.      Respiratory: Breathing is regular & unlabored while sitting.      HEENT: Hearing is intact to spoken word.  Speech is clear.  No gross evidence of visual impairment that would impact ambulation.      Dermatologic:  Right foot: submet three ulcer measures 1.0 x .5 x .1 cm. Smaller compared to last appt. More " granular. No probe to bone. No cellulitis.     Left foot submet 3 callus/preuclerative lesion, debrided to level of dermis. No open lesions or cellulitis. Preulcerative      Vascular: Dorsalis pedis and posterior tibial pulses are intact & regular bilaterally.  CFT and skin temperature is normal to both lower extremities.       Neurologic: Lower extremity sensation is diminished, bilateral foot, to light touch.  No evidence of neurological-based weakness or contracture in the lower extremities.       Musculoskeletal: Patient is ambulatory without an assistive device or brace.  S/p right first ray resection. Right 2nd digit --> now more rectus.   S/p left hallux and second digit amputations     Psychiatric: Affect is pleasant & appropriate.      Cultures:     Foot, Right; Tissue    0 Result Notes  Culture 2+ Pseudomonas aeruginosa Abnormal        Isolated in broth only Enterococcus faecalis Abnormal     On day 1 of incubation   1+ Normal josé            Resulting Agency: [facility]     Susceptibility     Pseudomonas aeruginosa Enterococcus faecalis     JEFFREY JEFFREY     Amikacin <=2 ug/mL Susceptible       Ampicillin   <=2 ug/mL Susceptible     Cefepime <=1 ug/mL Susceptible       Ceftazidime <=1 ug/mL Susceptible       Ciprofloxacin 2 ug/mL Resistant       Gentamicin <=1 ug/mL Susceptible       Gentamicin Synergy   Susceptible... Susceptible 1     Levofloxacin >=8 ug/mL Resistant       Meropenem 1 ug/mL Susceptible       Penicillin   2 ug/mL Susceptible     Piperacillin/Tazobactam <=4 ug/mL Susceptible       Tobramycin <=1 ug/mL Susceptible       Vancomycin   1 ug/mL Susceptible                 Foot, Right; Tissue    0 Result Notes  Culture 4+ Bacteroides fragilis Abnormal     Susceptibilities not routinely done   2+ Anaerococcus species Abnormal     Susceptibilities not routinely done        Pathology:     Final Diagnosis   Right third toe, metatarsal head, amputation-  -Bone with changes consistent with extensive acute  osteomyelitis.                 PROCEDURE:   Verbal consent was obtained for debridement. A 15 blade was used to excise the nonivable tissue to the ulcer, down to and including subcutaneous tissue. No noted purulence afterwards. Debrided site measures 2.0 cm x 1. x .1 cm. No probe to bone. Well tolerated. Site was cleaned with alcohol.  (right foot)     Verbal consent was obtained for debridement. A 15 blade was used to excise the nonivable tissue to the ulcer, down to and including dermis. No noted purulence afterwards. Debrided site measures 2.0 cm x 2 x .1 cm. No probe to bone. Well tolerated. Site was cleaned with alcohol.  (left foot)     Following debridement of right side. Site was anesthestized with 2 ml of lidocaine plain. It was cleansed with alcohol. 3 3-0 nylon sutures were placed at the site, reapproximating skin edges.     Sed rate: 65 --> 44 --> 33 --> 26 --> 1--> 37  CRP: 34 --> 7 --> 4 --> 5 --> 10.45 --> 6.33  WBC: 4.6 --> 2.6 --> 1.6--> 4      ASSESSMENT:  1. S/p right foot third head resection, fourth metatarsal floating osteotomy, ulcer debridement  --> being treated for residual osteomyelitis, 1/19/23   2. Left foot submet three preulcerative lesion  3. Neutropenia --> following with ID      MEDICAL DECISION MAKING:   -Right foot much overall without signs of infection. Ulcer smaller today and is primarily a fissure in appearance. Site excisionally debrided and sutured.   -Left side also excisionally debrided.   -Discussed all labs at length. Overall improved. Sed rate is elevated though. No signs of infection to right foot clinically.   -Cont once weekly dressing changes as needed.   -F/u in 2- 3 weeks         Tiny Soto DPM   [place] Department of Podiatry/Foot & Ankle Surgery    High complexity patient     Greater than 40 minutes were spent today, reviewing previous hospital records, counseling and educating the patient on the ongoing treatment plan and coordinating care.                Again, thank you for allowing me to participate in the care of your patient.        Sincerely,        Tiny Soto DPM

## 2023-03-29 NOTE — PROGRESS NOTES
[facility]  CLINIC NOTE    CHIEF COMPLAINT:  right foot    PATIENT HISTORY:  Crispin Rojas is a 62 year old male who follows up for right foot. Had third metatarsal head excision and fourth metatarsal osteotomy on 1/19/23. He's been on IV abx per ID due to possible residual osteomyelitis that was pseudomonas resistant to all oral options. He's now being transition to oral abx. Since last appt, he states he feels better overall, being off abx. States very little drainage to right foot. Denies pain. Denies N/F/V/C/D.  -He states since last appt ulcer has been dry and has not been draining since Sunday. States that he still wears his boot primarily, but in regular shoe today. Has been follow up with ID. Has been feeling, weak, fatigued and loss of appetite. No fevers or chills. Ginna pain to right foot.     Review of Systems:  A 10 point review of systems was performed and is positive for that noted above in the patient history.  All other areas are negative.     PAST MEDICAL HISTORY:   Past Medical History:   Diagnosis Date     Ascending aorta dilatation (H)      Cerebral infarction (H)     2010     Gastroesophageal reflux disease with esophagitis      H/O blood clots 2022     Hyperlipidemia LDL goal <70      Hypertension      Nonalcoholic steatohepatitis 11/30/2022     Numbness and tingling      Obesity      GILA (obstructive sleep apnea) 10/22/2018    CPAP intolerant     PVD (peripheral vascular disease) (H)     s/p left partial toe amputation     Renal stone      Tremor      Type 2 diabetes mellitus with diabetic polyneuropathy, with long-term current use of insulin (H)         PAST SURGICAL HISTORY:   Past Surgical History:   Procedure Laterality Date     AMPUTATE FOOT Left 8/18/2016    Procedure: AMPUTATE FOOT;  Surgeon: Jack Younger DPM;  Location: [facility]     AMPUTATE TOE(S) Right 2/1/2016    Procedure: AMPUTATE TOE(S);  Surgeon: Rachelle Manriquez DPM, Pod;  Location: [facility]      AMPUTATE TOE(S) Right 8/2/2019    Procedure: Right fourth toe partial amputation for treatment of osteomyelitis.;  Surgeon: Jack Younger DPM;  Location: [facility]     AMPUTATE TOE(S) Left 11/8/2019    Procedure: Left second toe amputation at the metatarsophalangeal joint.;  Surgeon: Rachelle Manriquez DPM, Podiatry/Foot and Ankle Surgery;  Location: [facility]     AMPUTATE TOE(S) Right 6/5/2022    Procedure: AMPUTATION OF RIGHT GREAT TOE;  Surgeon: Wili Quiles DPM;  Location: [facility]     AMPUTATE TOE(S) Right 7/28/2022    Procedure: Right foot partial first metatarsal resection;  Surgeon: Tiny Soto DPM;  Location: [facility]     ANGIOGRAM       BIOPSY BONE FOOT Right 7/24/2022    Procedure: Bone biopsy, right first metatarsal.;  Surgeon: Valentino Molina DPM;  Location: [facility]     COLONOSCOPY       COMBINED CYSTOSCOPY, RETROGRADES, URETEROSCOPY, INSERT STENT Left 8/21/2016    Procedure: COMBINED CYSTOSCOPY, RETROGRADES, URETEROSCOPY, INSERT STENT;  Surgeon: Artemio Valenzuela MD;  Location: [facility]     COMBINED CYSTOSCOPY, RETROGRADES, URETEROSCOPY, LASER HOLMIUM LITHOTRIPSY URETER(S), INSERT STENT Left 10/3/2016    Procedure: COMBINED CYSTOSCOPY, RETROGRADES, URETEROSCOPY, LASER HOLMIUM LITHOTRIPSY URETER(S), INSERT STENT;  Surgeon: Artemio Valenzuela MD;  Location: [facility]     EXTRACORPOREAL SHOCK WAVE LITHOTRIPSY (ESWL) Left 9/8/2016    Procedure: EXTRACORPOREAL SHOCK WAVE LITHOTRIPSY (ESWL);  Surgeon: Artemio Valenzuela MD;  Location: [facility]     INCISION AND DRAINAGE FOOT, COMBINED Right 7/24/2022    Procedure: Incision and drainage, right foot ;  Surgeon: Valentino Molina DPM;  Location: [facility]     OSTEOTOMY FOOT Right 11/30/2022    Procedure: 1. Right second metatarsal floating osteotomy  2. Right second digit proximal phalanx arthroplasty 3. Right percutaneous flexor tenotomy;  Surgeon: Tiny Soto DPM;  Location: [facility]     OSTEOTOMY FOOT Right 1/19/2023    Procedure: Right  foot third metatarsal head excision, ulcer debridement, matatarsal osteotomies;  Surgeon: Tiny Soto DPM;  Location: [facility]     RECESSION GASTROCNEMIUS Right 2/1/2016    Procedure: RECESSION GASTROCNEMIUS;  Surgeon: Rachelle Manriquez DPM, Pod;  Location: [facility]        MEDICATIONS:  Reviewed in Epic.     ALLERGIES:    Allergies   Allergen Reactions     Hmg-Coa-R Inhibitors Swelling     Other reaction(s): Other (see comments)  SIMCOR caused edema in extremities       Bactrim [Sulfamethoxazole W/Trimethoprim] Nausea and Vomiting     Sulfa Drugs Nausea     Niacin Swelling     SIMCOR caused edema in extremities     Simvastatin Swelling     SIMCOR caused edema in extremities        SOCIAL HISTORY:   Social History     Socioeconomic History     Marital status:      Spouse name: Sydney     Number of children: 2     Years of education: Not on file     Highest education level: Master's degree (e.g., MA, MS, [place], MEd, MSW, ELIANA)   Occupational History     Occupation: marketing     Employer: [employer]     Comment: [employer]   Tobacco Use     Smoking status: Never     Smokeless tobacco: Never   Vaping Use     Vaping Use: Never used   Substance and Sexual Activity     Alcohol use: Yes     Comment: rare---red wine 3x per month     Drug use: Never     Sexual activity: Not Currently     Partners: Female   Other Topics Concern     Parent/sibling w/ CABG, MI or angioplasty before 65F 55M? No   Social History Narrative     Not on file     Social Determinants of Health     Financial Resource Strain: Low Risk      Difficulty of Paying Living Expenses: Not very hard   Food Insecurity: No Food Insecurity     Worried About Running Out of Food in the Last Year: Never true     Ran Out of Food in the Last Year: Never true   Transportation Needs: No Transportation Needs     Lack of Transportation (Medical): No     Lack of Transportation (Non-Medical): No   Physical Activity: Sufficiently Active     Days of Exercise per Week: 5  "days     Minutes of Exercise per Session: 50 min   Stress: No Stress Concern Present     Feeling of Stress : Not at all   Social Connections: Moderately Integrated     Frequency of Communication with Friends and Family: Twice a week     Frequency of Social Gatherings with Friends and Family: Never     Attends Advent Services: More than 4 times per year     Active Member of Clubs or Organizations: Yes     Attends Club or Organization Meetings: Not on file     Marital Status:    Intimate Partner Violence: Not on file   Housing Stability: Low Risk      Unable to Pay for Housing in the Last Year: No     Number of Places Lived in the Last Year: 2     Unstable Housing in the Last Year: No        FAMILY HISTORY:   Family History   Problem Relation Age of Onset     Arthritis Mother         SLE     Connective Tissue Disorder Mother         lupus     Diabetes Mother      Cerebrovascular Disease Mother      Cancer Mother      Diabetes Father      Coronary Artery Disease Father      Chronic Obstructive Pulmonary Disease Father      Diabetes Sister      Diabetes Maternal Grandfather         EXAM:Vitals: /68   Ht 1.803 m (5' 11\")   Wt 102.5 kg (226 lb)   BMI 31.52 kg/m    BMI= Body mass index is 31.52 kg/m .      General appearance: Patient is alert and fully cooperative with history & exam.  No sign of distress is noted during the visit.      Respiratory: Breathing is regular & unlabored while sitting.      HEENT: Hearing is intact to spoken word.  Speech is clear.  No gross evidence of visual impairment that would impact ambulation.      Dermatologic:  Right foot: submet three ulcer measures 1.0 x .5 x .1 cm. Smaller compared to last appt. More granular. No probe to bone. No cellulitis.     Left foot submet 3 callus/preuclerative lesion, debrided to level of dermis. No open lesions or cellulitis. Preulcerative      Vascular: Dorsalis pedis and posterior tibial pulses are intact & regular bilaterally.  CFT and " skin temperature is normal to both lower extremities.       Neurologic: Lower extremity sensation is diminished, bilateral foot, to light touch.  No evidence of neurological-based weakness or contracture in the lower extremities.       Musculoskeletal: Patient is ambulatory without an assistive device or brace.  S/p right first ray resection. Right 2nd digit --> now more rectus.   S/p left hallux and second digit amputations     Psychiatric: Affect is pleasant & appropriate.      Cultures:     Foot, Right; Tissue    0 Result Notes  Culture 2+ Pseudomonas aeruginosa Abnormal        Isolated in broth only Enterococcus faecalis Abnormal     On day 1 of incubation   1+ Normal josé            Resulting Agency: [facility]     Susceptibility     Pseudomonas aeruginosa Enterococcus faecalis     JEFFREY JEFFREY     Amikacin <=2 ug/mL Susceptible       Ampicillin   <=2 ug/mL Susceptible     Cefepime <=1 ug/mL Susceptible       Ceftazidime <=1 ug/mL Susceptible       Ciprofloxacin 2 ug/mL Resistant       Gentamicin <=1 ug/mL Susceptible       Gentamicin Synergy   Susceptible... Susceptible 1     Levofloxacin >=8 ug/mL Resistant       Meropenem 1 ug/mL Susceptible       Penicillin   2 ug/mL Susceptible     Piperacillin/Tazobactam <=4 ug/mL Susceptible       Tobramycin <=1 ug/mL Susceptible       Vancomycin   1 ug/mL Susceptible                 Foot, Right; Tissue    0 Result Notes  Culture 4+ Bacteroides fragilis Abnormal     Susceptibilities not routinely done   2+ Anaerococcus species Abnormal     Susceptibilities not routinely done        Pathology:     Final Diagnosis   Right third toe, metatarsal head, amputation-  -Bone with changes consistent with extensive acute osteomyelitis.                 PROCEDURE:   Verbal consent was obtained for debridement. A 15 blade was used to excise the nonivable tissue to the ulcer, down to and including subcutaneous tissue. No noted purulence afterwards. Debrided site measures 2.0 cm x 1. x .1 cm. No  probe to bone. Well tolerated. Site was cleaned with alcohol.  (right foot)     Verbal consent was obtained for debridement. A 15 blade was used to excise the nonivable tissue to the ulcer, down to and including dermis. No noted purulence afterwards. Debrided site measures 2.0 cm x 2 x .1 cm. No probe to bone. Well tolerated. Site was cleaned with alcohol.  (left foot)     Following debridement of right side. Site was anesthestized with 2 ml of lidocaine plain. It was cleansed with alcohol. 3 3-0 nylon sutures were placed at the site, reapproximating skin edges.     Sed rate: 65 --> 44 --> 33 --> 26 --> 1--> 37  CRP: 34 --> 7 --> 4 --> 5 --> 10.45 --> 6.33  WBC: 4.6 --> 2.6 --> 1.6--> 4      ASSESSMENT:  1. S/p right foot third head resection, fourth metatarsal floating osteotomy, ulcer debridement  --> being treated for residual osteomyelitis, 1/19/23   2. Left foot submet three preulcerative lesion  3. Neutropenia --> following with ID      MEDICAL DECISION MAKING:   -Right foot much overall without signs of infection. Ulcer smaller today and is primarily a fissure in appearance. Site excisionally debrided and sutured.   -Left side also excisionally debrided.   -Discussed all labs at length. Overall improved. Sed rate is elevated though. No signs of infection to right foot clinically.   -Cont once weekly dressing changes as needed.   -F/u in 2- 3 weeks         Tiny Soto DPM   [facility]    High complexity patient     Greater than 40 minutes were spent today, reviewing previous hospital records, counseling and educating the patient on the ongoing treatment plan and coordinating care.

## 2023-04-10 DIAGNOSIS — Z79.4 TYPE 2 DIABETES MELLITUS WITH DIABETIC PERIPHERAL ANGIOPATHY AND GANGRENE, WITH LONG-TERM CURRENT USE OF INSULIN (H): ICD-10-CM

## 2023-04-10 DIAGNOSIS — E11.52 TYPE 2 DIABETES MELLITUS WITH DIABETIC PERIPHERAL ANGIOPATHY AND GANGRENE, WITH LONG-TERM CURRENT USE OF INSULIN (H): ICD-10-CM

## 2023-04-10 RX ORDER — METFORMIN HCL 500 MG
TABLET, EXTENDED RELEASE 24 HR ORAL
Qty: 360 TABLET | Refills: 0 | Status: SHIPPED | OUTPATIENT
Start: 2023-04-10 | End: 2023-07-06

## 2023-04-10 NOTE — TELEPHONE ENCOUNTER
"Requested Prescriptions   Pending Prescriptions Disp Refills     metFORMIN (GLUCOPHAGE XR) 500 MG 24 hr tablet [Pharmacy Med Name: METFORMIN ER 500MG 24HR TABS] 360 tablet 0     Sig: TAKE 2 TABLETS BY MOUTH TWICE DAILY       Biguanide Agents Passed - 4/10/2023 12:33 PM        Passed - Patient is age 10 or older        Passed - Patient has documented A1c within the specified period of time.     If HgbA1C is 8 or greater, it needs to be on file within the past 3 months.  If less than 8, must be on file within the past 6 months.     Recent Labs   Lab Test 11/29/22  1112   A1C 5.6             Passed - Patient's CR is NOT>1.4 OR Patient's EGFR is NOT<45 within past 12 mos.     Recent Labs   Lab Test 03/10/23  1304 09/14/21  1421 05/10/21  1555   GFRESTIMATED >90   < > >90   GFRESTBLACK  --   --  >90    < > = values in this interval not displayed.       Recent Labs   Lab Test 03/10/23  1304   CR 0.86             Passed - Patient does NOT have a diagnosis of CHF.        Passed - Medication is active on med list        Passed - Recent (6 mo) or future (30 days) visit within the authorizing provider's specialty     Patient had office visit in the last 6 months or has a visit in the next 30 days with authorizing provider or within the authorizing provider's specialty.  See \"Patient Info\" tab in inbasket, or \"Choose Columns\" in Meds & Orders section of the refill encounter.                 "

## 2023-04-19 ENCOUNTER — OFFICE VISIT (OUTPATIENT)
Dept: PODIATRY | Facility: CLINIC | Age: 63
End: 2023-04-19
Payer: COMMERCIAL

## 2023-04-19 VITALS — DIASTOLIC BLOOD PRESSURE: 72 MMHG | SYSTOLIC BLOOD PRESSURE: 124 MMHG | BODY MASS INDEX: 31.52 KG/M2 | WEIGHT: 226 LBS

## 2023-04-19 DIAGNOSIS — L97.512 RIGHT FOOT ULCER, WITH FAT LAYER EXPOSED (H): ICD-10-CM

## 2023-04-19 DIAGNOSIS — Z98.890 S/P FOOT SURGERY, RIGHT: Primary | ICD-10-CM

## 2023-04-19 DIAGNOSIS — L97.521 ULCER OF LEFT FOOT, LIMITED TO BREAKDOWN OF SKIN (H): ICD-10-CM

## 2023-04-19 PROCEDURE — 11042 DBRDMT SUBQ TIS 1ST 20SQCM/<: CPT | Mod: 58 | Performed by: PODIATRIST

## 2023-04-19 PROCEDURE — 99024 POSTOP FOLLOW-UP VISIT: CPT | Performed by: PODIATRIST

## 2023-04-19 NOTE — PROGRESS NOTES
[place] [facility]/FOOT & ANKLE SURGERY  CLINIC NOTE    CHIEF COMPLAINT:  right foot    PATIENT HISTORY:  Crispin Rojas is a 62 year old male who follows up for right foot. Had third metatarsal head excision and fourth metatarsal osteotomy on 1/19/23. He's been on IV abx per ID due to possible residual osteomyelitis that was pseudomonas resistant to all oral options. He's now being transition to oral abx. Since last appt, he states he feels better overall, being off abx. States very little drainage to right foot. Denies pain. Denies N/F/V/C/D.  -At last appt, sutures applied to right foot. States they're intact and site has been totally dry. States left side was worsening since last appt, but it's not improved. States is still limiting walking and applying majority of weight to left side. Denies N/F/V/C/D. Had recent follow up with ID    Review of Systems:  A 10 point review of systems was performed and is positive for that noted above in the patient history.  All other areas are negative.     PAST MEDICAL HISTORY:   Past Medical History:   Diagnosis Date     Ascending aorta dilatation (H)      Cerebral infarction (H)     2010     Gastroesophageal reflux disease with esophagitis      H/O blood clots 2022     Hyperlipidemia LDL goal <70      Hypertension      Nonalcoholic steatohepatitis 11/30/2022     Numbness and tingling      Obesity      GILA (obstructive sleep apnea) 10/22/2018    CPAP intolerant     PVD (peripheral vascular disease) (H)     s/p left partial toe amputation     Renal stone      Tremor      Type 2 diabetes mellitus with diabetic polyneuropathy, with long-term current use of insulin (H)         PAST SURGICAL HISTORY:   Past Surgical History:   Procedure Laterality Date     AMPUTATE FOOT Left 8/18/2016    Procedure: AMPUTATE FOOT;  Surgeon: Jack Younger DPM;  [facility]     AMPUTATE TOE(S) Right 2/1/2016    Procedure: AMPUTATE TOE(S);  Surgeon: Rachelle Manriquez DPM, Pod;  [facility]     AMPUTATE TOE(S) Right 8/2/2019    Procedure: Right fourth toe partial amputation for treatment of osteomyelitis.;  Surgeon: Jack Younger DPM;  [facility]     AMPUTATE TOE(S) Left 11/8/2019    Procedure: Left second toe amputation at the metatarsophalangeal joint.;  Surgeon: Rachelle Manriquez DPM, Podiatry/Foot and Ankle Surgery;  [facility]     AMPUTATE TOE(S) Right 6/5/2022    Procedure: AMPUTATION OF RIGHT GREAT TOE;  Surgeon: Wili Quiles DPM;  [facility]     AMPUTATE TOE(S) Right 7/28/2022    Procedure: Right foot partial first metatarsal resection;  Surgeon: Tiny Soto DPM;  [facility]     ANGIOGRAM       BIOPSY BONE FOOT Right 7/24/2022    Procedure: Bone biopsy, right first metatarsal.;  Surgeon: Valentino Molina DPM;  Location: [facility]     COLONOSCOPY       COMBINED CYSTOSCOPY, RETROGRADES, URETEROSCOPY, INSERT STENT Left 8/21/2016    Procedure: COMBINED CYSTOSCOPY, RETROGRADES, URETEROSCOPY, INSERT STENT;  Surgeon: Artemio Valenzuela MD;  [facility]     COMBINED CYSTOSCOPY, RETROGRADES, URETEROSCOPY, LASER HOLMIUM LITHOTRIPSY URETER(S), INSERT STENT Left 10/3/2016    Procedure: COMBINED CYSTOSCOPY, RETROGRADES, URETEROSCOPY, LASER HOLMIUM LITHOTRIPSY URETER(S), INSERT STENT;  Surgeon: Artemio Valenzuela MD;  [facility]     EXTRACORPOREAL SHOCK WAVE LITHOTRIPSY (ESWL) Left 9/8/2016    Procedure: EXTRACORPOREAL SHOCK WAVE LITHOTRIPSY (ESWL);  Surgeon: Artemio Valenzuela MD;  [facility]: [facility]     INCISION AND DRAINAGE FOOT, COMBINED Right 7/24/2022    Procedure: Incision and drainage, right foot ;  Surgeon: Valentino Molina DPM;  [facility]     OSTEOTOMY FOOT Right 11/30/2022    Procedure: 1. Right second metatarsal floating osteotomy  2. Right second digit proximal phalanx arthroplasty 3. Right percutaneous flexor tenotomy;  Surgeon: Tiny Soto DPM;  [facility]     OSTEOTOMY FOOT Right 1/19/2023    Procedure:  Right foot third metatarsal head excision, ulcer debridement, matatarsal osteotomies;  Surgeon: Tiny Soto DPM;  [facility]     RECESSION GASTROCNEMIUS Right 2/1/2016    Procedure: RECESSION GASTROCNEMIUS;  Surgeon: Rachelle Manriquez DPM, Pod;  [facility]        MEDICATIONS:  Reviewed in Epic.     ALLERGIES:    Allergies   Allergen Reactions     Hmg-Coa-R Inhibitors Swelling     Other reaction(s): Other (see comments)  SIMCOR caused edema in extremities       Bactrim [Sulfamethoxazole W/Trimethoprim] Nausea and Vomiting     Sulfa Drugs Nausea     Niacin Swelling     SIMCOR caused edema in extremities     Simvastatin Swelling     SIMCOR caused edema in extremities        SOCIAL HISTORY:   Social History     Socioeconomic History     Marital status:      Spouse name: Sydney     Number of children: 2     Years of education: Not on file     Highest education level: Master's degree (e.g., MA, MS, Arleth, MEd, MSW, ELIANA)   Occupational History     Occupation: marketing     Employer: [employer]     Comment: [employer]   Tobacco Use     Smoking status: Never     Smokeless tobacco: Never   Vaping Use     Vaping status: Never Used   Substance and Sexual Activity     Alcohol use: Yes     Comment: rare---red wine 3x per month     Drug use: Never     Sexual activity: Not Currently     Partners: Female   Other Topics Concern     Parent/sibling w/ CABG, MI or angioplasty before 65F 55M? No   Social History Narrative     Not on file     Social Determinants of Health     Financial Resource Strain: Low Risk  (1/9/2023)    Overall Financial Resource Strain (CARDIA)      Difficulty of Paying Living Expenses: Not very hard   Food Insecurity: No Food Insecurity (1/9/2023)    Hunger Vital Sign      Worried About Running Out of Food in the Last Year: Never true      Ran Out of Food in the Last Year: Never true   Transportation Needs: No Transportation Needs (1/9/2023)    PRAPARE - Transportation      Lack of  Transportation (Medical): No      Lack of Transportation (Non-Medical): No   Physical Activity: Sufficiently Active (1/9/2023)    Exercise Vital Sign      Days of Exercise per Week: 5 days      Minutes of Exercise per Session: 50 min   Stress: No Stress Concern Present (1/9/2023)    [place] [employer]ational [employer] - Occupational Stress Questionnaire      Feeling of Stress : Not at all   Social Connections: Moderately Integrated (1/9/2023)    Social Connection and Isolation Panel [NHANES]      Frequency of Communication with Friends and Family: Twice a week      Frequency of Social Gatherings with Friends and Family: Never      Attends Tenriism Services: More than 4 times per year      Active Member of Clubs or Organizations: Yes      Attends Club or Organization Meetings: Not on file      Marital Status:    Intimate Partner Violence: Not on file   Housing Stability: Low Risk  (1/9/2023)    Housing Stability Vital Sign      Unable to Pay for Housing in the Last Year: No      Number of Places Lived in the Last Year: 2      Unstable Housing in the Last Year: No        FAMILY HISTORY:   Family History   Problem Relation Age of Onset     Arthritis Mother         SLE     Connective Tissue Disorder Mother         lupus     Diabetes Mother      Cerebrovascular Disease Mother      Cancer Mother      Diabetes Father      Coronary Artery Disease Father      Chronic Obstructive Pulmonary Disease Father      Diabetes Sister      Diabetes Maternal Grandfather         EXAM:Vitals: /72   Wt 102.5 kg (226 lb)   BMI 31.52 kg/m    BMI= Body mass index is 31.52 kg/m .      General appearance: Patient is alert and fully cooperative with history & exam.  No sign of distress is noted during the visit.      Respiratory: Breathing is regular & unlabored while sitting.      HEENT: Hearing is intact to spoken word.  Speech is clear.  No gross evidence of visual impairment that would impact ambulation.       Dermatologic:  Right foot: submet three ulcer --> now appears healed and closed. Callus to site. No drainage. Sutures removed.     Left foot submet 3 callus/preuclerative lesion, debrided to level of subcutaneous tissue. No open lesions or cellulitis. Measures approx .5 x .5 x .2 cm     Vascular: Dorsalis pedis and posterior tibial pulses are intact & regular bilaterally.  CFT and skin temperature is normal to both lower extremities.       Neurologic: Lower extremity sensation is diminished, bilateral foot, to light touch.  No evidence of neurological-based weakness or contracture in the lower extremities.       Musculoskeletal: Patient is ambulatory without an assistive device or brace.  S/p right first ray resection. Right 2nd digit --> now more rectus.   S/p left hallux and second digit amputations     Psychiatric: Affect is pleasant & appropriate.      Cultures:     Foot, Right; Tissue    0 Result Notes  Culture 2+ Pseudomonas aeruginosa Abnormal        Isolated in broth only Enterococcus faecalis Abnormal     On day 1 of incubation   1+ Normal josé            Resulting Agency: [facility]     Susceptibility     Pseudomonas aeruginosa Enterococcus faecalis     JEFFREY JEFFREY     Amikacin <=2 ug/mL Susceptible       Ampicillin   <=2 ug/mL Susceptible     Cefepime <=1 ug/mL Susceptible       Ceftazidime <=1 ug/mL Susceptible       Ciprofloxacin 2 ug/mL Resistant       Gentamicin <=1 ug/mL Susceptible       Gentamicin Synergy   Susceptible... Susceptible 1     Levofloxacin >=8 ug/mL Resistant       Meropenem 1 ug/mL Susceptible       Penicillin   2 ug/mL Susceptible     Piperacillin/Tazobactam <=4 ug/mL Susceptible       Tobramycin <=1 ug/mL Susceptible       Vancomycin   1 ug/mL Susceptible                 Foot, Right; Tissue    0 Result Notes  Culture 4+ Bacteroides fragilis Abnormal     Susceptibilities not routinely done   2+ Anaerococcus species Abnormal     Susceptibilities not routinely done        Pathology:      Final Diagnosis   Right third toe, metatarsal head, amputation-  -Bone with changes consistent with extensive acute osteomyelitis.               PROCEDURE:   Verbal consent was obtained for debridement. Done on left foot today. A 15 blade was used to excise the nonivable tissue to the ulcer, down to and including subcutaneous tissue. No noted purulence afterwards. Debrided site measures .5 x .5 x .2 cm. No probe to bone. Well tolerated. Site was cleaned with alcohol.  (right foot)     Sed rate: 65 --> 44 --> 33 --> 26 --> 1--> 37  CRP: 34 --> 7 --> 4 --> 5 --> 10.45 --> 6.33  WBC: 4.6 --> 2.6 --> 1.6--> 4      ASSESSMENT:  1. S/p right foot third head resection, fourth metatarsal floating osteotomy, ulcer debridement  --> being treated for residual osteomyelitis, 1/19/23    -Site now healed   2. Left foot submet three preulcerative lesion   -Site slightly worse today. Depth to subcutaenous tissue. Debrided   3. Neutropenia --> following with ID      MEDICAL DECISION MAKING:   -Right foot much overall without signs of infection. Sutures removed. Site now healed. Transition to wakling in CMO   -Left side also excisionally debrided. Cont to keep site covered. Ideally will heal as patient's gait evens out. Has been primarily applying pressure to left foot during healing process of right foot.   -No signs of infection to either foot.   -Plan for follow up in 3 weeks. Discussed transition to full acitivyt during that time. Week one: wearing CMOs to both feet, walking more at work (), possibly going to driving range. Week 2: using cart and attempting to golf, walking around the block. Etc   -Cont to closely monitor feet during this time.     Tiny Soto DPM   [place] Department of Podiatry/Foot & Ankle Surgery

## 2023-04-19 NOTE — LETTER
4/19/2023         RE: Crispin Rojas  [address] [facility] [address]        Dear Colleague,    Thank you for referring your patient, Crispin Rojas, to the [facility]. Please see a copy of my visit note below.    [place] [facility]/FOOT & ANKLE SURGERY  CLINIC NOTE    CHIEF COMPLAINT:  right foot    PATIENT HISTORY:  Crispin Rojas is a 62 year old male who follows up for right foot. Had third metatarsal head excision and fourth metatarsal osteotomy on 1/19/23. He's been on IV abx per ID due to possible residual osteomyelitis that was pseudomonas resistant to all oral options. He's now being transition to oral abx. Since last appt, he states he feels better overall, being off abx. States very little drainage to right foot. Denies pain. Denies N/F/V/C/D.  -At last appt, sutures applied to right foot. States they're intact and site has been totally dry. States left side was worsening since last appt, but it's not improved. States is still limiting walking and applying majority of weight to left side. Denies N/F/V/C/D. Had recent follow up with ID    Review of Systems:  A 10 point review of systems was performed and is positive for that noted above in the patient history.  All other areas are negative.     PAST MEDICAL HISTORY:   Past Medical History:   Diagnosis Date     Ascending aorta dilatation (H)      Cerebral infarction (H)     2010     Gastroesophageal reflux disease with esophagitis      H/O blood clots 2022     Hyperlipidemia LDL goal <70      Hypertension      Nonalcoholic steatohepatitis 11/30/2022     Numbness and tingling      Obesity      GILA (obstructive sleep apnea) 10/22/2018    CPAP intolerant     PVD (peripheral vascular disease) (H)     s/p left partial toe amputation     Renal stone      Tremor      Type 2 diabetes mellitus with diabetic polyneuropathy, with long-term current use of insulin (H)         PAST SURGICAL HISTORY:   Past Surgical History:    Procedure Laterality Date     AMPUTATE FOOT Left 8/18/2016    Procedure: AMPUTATE FOOT;  Surgeon: Jack Younger DPM;  [facility]     AMPUTATE TOE(S) Right 2/1/2016    Procedure: AMPUTATE TOE(S);  Surgeon: Rachelle Manriquez DPM, Pod;  [facility]     AMPUTATE TOE(S) Right 8/2/2019    Procedure: Right fourth toe partial amputation for treatment of osteomyelitis.;  Surgeon: Jack Younger DPM;  [facility]     AMPUTATE TOE(S) Left 11/8/2019    Procedure: Left second toe amputation at the metatarsophalangeal joint.;  Surgeon: Rachelle Manriquez DPM, Podiatry/Foot and Ankle Surgery;  [facility]     AMPUTATE TOE(S) Right 6/5/2022    Procedure: AMPUTATION OF RIGHT GREAT TOE;  Surgeon: Wili Quiles DPM;  [facility]     AMPUTATE TOE(S) Right 7/28/2022    Procedure: Right foot partial first metatarsal resection;  Surgeon: Tiny Soto DPM;  [facility]: [facility]     ANGIOGRAM       BIOPSY BONE FOOT Right 7/24/2022    Procedure: Bone biopsy, right first metatarsal.;  Surgeon: Valentino Molina DPM;  [facility]     COLONOSCOPY       COMBINED CYSTOSCOPY, RETROGRADES, URETEROSCOPY, INSERT STENT Left 8/21/2016    Procedure: COMBINED CYSTOSCOPY, RETROGRADES, URETEROSCOPY, INSERT STENT;  Surgeon: Artemio Valenzuela MD;  [facility]     COMBINED CYSTOSCOPY, RETROGRADES, URETEROSCOPY, LASER HOLMIUM LITHOTRIPSY URETER(S), INSERT STENT Left 10/3/2016    Procedure: COMBINED CYSTOSCOPY, RETROGRADES, URETEROSCOPY, LASER HOLMIUM LITHOTRIPSY URETER(S), INSERT STENT;  Surgeon: Artemio Valenzuela MD;  [facility]     EXTRACORPOREAL SHOCK WAVE LITHOTRIPSY (ESWL) Left 9/8/2016    Procedure: EXTRACORPOREAL SHOCK WAVE LITHOTRIPSY (ESWL);  Surgeon: Artemio Valenzuela MD;  [facility]     INCISION AND DRAINAGE FOOT, COMBINED Right 7/24/2022    Procedure: Incision and drainage, right foot ;  Surgeon: Valentino Molina DPM;  [facility]     OSTEOTOMY FOOT Right  11/30/2022    Procedure: 1. Right second metatarsal floating osteotomy  2. Right second digit proximal phalanx arthroplasty 3. Right percutaneous flexor tenotomy;  Surgeon: Tiny Soto DPM;  [facility]     OSTEOTOMY FOOT Right 1/19/2023    Procedure: Right foot third metatarsal head excision, ulcer debridement, matatarsal osteotomies;  Surgeon: Tiny Soto DPM;  [facility]     RECESSION GASTROCNEMIUS Right 2/1/2016    Procedure: RECESSION GASTROCNEMIUS;  Surgeon: Rachelle Manriquez DPM, Pod;  [facility]        MEDICATIONS:  Reviewed in Epic.     ALLERGIES:    Allergies   Allergen Reactions     Hmg-Coa-R Inhibitors Swelling     Other reaction(s): Other (see comments)  SIMCOR caused edema in extremities       Bactrim [Sulfamethoxazole W/Trimethoprim] Nausea and Vomiting     Sulfa Drugs Nausea     Niacin Swelling     SIMCOR caused edema in extremities     Simvastatin Swelling     SIMCOR caused edema in extremities        SOCIAL HISTORY:   Social History     Socioeconomic History     Marital status:      Spouse name: Sydney     Number of children: 2     Years of education: Not on file     Highest education level: Master's degree (e.g., MA, MS, Arleth, MEd, MSW, ELIANA)   Occupational History     Occupation: marketing     Employer: [employer]     Comment: [employer]   Tobacco Use     Smoking status: Never     Smokeless tobacco: Never   Vaping Use     Vaping status: Never Used   Substance and Sexual Activity     Alcohol use: Yes     Comment: rare---red wine 3x per month     Drug use: Never     Sexual activity: Not Currently     Partners: Female   Other Topics Concern     Parent/sibling w/ CABG, MI or angioplasty before 65F 55M? No   Social History Narrative     Not on file     Social Determinants of Health     Financial Resource Strain: Low Risk  (1/9/2023)    Overall Financial Resource Strain (CARDIA)      Difficulty of Paying Living Expenses: Not very hard   Food Insecurity: No Food  Insecurity (1/9/2023)    Hunger Vital Sign      Worried About Running Out of Food in the Last Year: Never true      Ran Out of Food in the Last Year: Never true   Transportation Needs: No Transportation Needs (1/9/2023)    PRAPARE - Transportation      Lack of Transportation (Medical): No      Lack of Transportation (Non-Medical): No   Physical Activity: Sufficiently Active (1/9/2023)    Exercise Vital Sign      Days of Exercise per Week: 5 days      Minutes of Exercise per Session: 50 min   Stress: No Stress Concern Present (1/9/2023)    [place] of Occupational Health - Occupational Stress Questionnaire      Feeling of Stress : Not at all   Social Connections: Moderately Integrated (1/9/2023)    Social Connection and Isolation Panel [NHANES]      Frequency of Communication with Friends and Family: Twice a week      Frequency of Social Gatherings with Friends and Family: Never      Attends Pentecostal Services: More than 4 times per year      Active Member of Clubs or Organizations: Yes      Attends Club or Organization Meetings: Not on file      Marital Status:    Intimate Partner Violence: Not on file   Housing Stability: Low Risk  (1/9/2023)    Housing Stability Vital Sign      Unable to Pay for Housing in the Last Year: No      Number of Places Lived in the Last Year: 2      Unstable Housing in the Last Year: No        FAMILY HISTORY:   Family History   Problem Relation Age of Onset     Arthritis Mother         SLE     Connective Tissue Disorder Mother         lupus     Diabetes Mother      Cerebrovascular Disease Mother      Cancer Mother      Diabetes Father      Coronary Artery Disease Father      Chronic Obstructive Pulmonary Disease Father      Diabetes Sister      Diabetes Maternal Grandfather         EXAM:Vitals: /72   Wt 102.5 kg (226 lb)   BMI 31.52 kg/m    BMI= Body mass index is 31.52 kg/m .      General appearance: Patient is alert and fully cooperative with history & exam.  No  sign of distress is noted during the visit.      Respiratory: Breathing is regular & unlabored while sitting.      HEENT: Hearing is intact to spoken word.  Speech is clear.  No gross evidence of visual impairment that would impact ambulation.      Dermatologic:  Right foot: submet three ulcer --> now appears healed and closed. Callus to site. No drainage. Sutures removed.     Left foot submet 3 callus/preuclerative lesion, debrided to level of subcutaneous tissue. No open lesions or cellulitis. Measures approx .5 x .5 x .2 cm     Vascular: Dorsalis pedis and posterior tibial pulses are intact & regular bilaterally.  CFT and skin temperature is normal to both lower extremities.       Neurologic: Lower extremity sensation is diminished, bilateral foot, to light touch.  No evidence of neurological-based weakness or contracture in the lower extremities.       Musculoskeletal: Patient is ambulatory without an assistive device or brace.  S/p right first ray resection. Right 2nd digit --> now more rectus.   S/p left hallux and second digit amputations     Psychiatric: Affect is pleasant & appropriate.      Cultures:     Foot, Right; Tissue    0 Result Notes  Culture 2+ Pseudomonas aeruginosa Abnormal        Isolated in broth only Enterococcus faecalis Abnormal     On day 1 of incubation   1+ Normal josé            Resulting Agency: [facility]     Susceptibility     Pseudomonas aeruginosa Enterococcus faecalis     JEFFREY JEFFREY     Amikacin <=2 ug/mL Susceptible       Ampicillin   <=2 ug/mL Susceptible     Cefepime <=1 ug/mL Susceptible       Ceftazidime <=1 ug/mL Susceptible       Ciprofloxacin 2 ug/mL Resistant       Gentamicin <=1 ug/mL Susceptible       Gentamicin Synergy   Susceptible... Susceptible 1     Levofloxacin >=8 ug/mL Resistant       Meropenem 1 ug/mL Susceptible       Penicillin   2 ug/mL Susceptible     Piperacillin/Tazobactam <=4 ug/mL Susceptible       Tobramycin <=1 ug/mL Susceptible       Vancomycin   1 ug/mL  Susceptible                 Foot, Right; Tissue    0 Result Notes  Culture 4+ Bacteroides fragilis Abnormal     Susceptibilities not routinely done   2+ Anaerococcus species Abnormal     Susceptibilities not routinely done        Pathology:     Final Diagnosis   Right third toe, metatarsal head, amputation-  -Bone with changes consistent with extensive acute osteomyelitis.               PROCEDURE:   Verbal consent was obtained for debridement. Done on left foot today. A 15 blade was used to excise the nonivable tissue to the ulcer, down to and including subcutaneous tissue. No noted purulence afterwards. Debrided site measures .5 x .5 x .2 cm. No probe to bone. Well tolerated. Site was cleaned with alcohol.  (right foot)     Sed rate: 65 --> 44 --> 33 --> 26 --> 1--> 37  CRP: 34 --> 7 --> 4 --> 5 --> 10.45 --> 6.33  WBC: 4.6 --> 2.6 --> 1.6--> 4      ASSESSMENT:  1. S/p right foot third head resection, fourth metatarsal floating osteotomy, ulcer debridement  --> being treated for residual osteomyelitis, 1/19/23    -Site now healed   2. Left foot submet three preulcerative lesion   -Site slightly worse today. Depth to subcutaenous tissue. Debrided   3. Neutropenia --> following with ID      MEDICAL DECISION MAKING:   -Right foot much overall without signs of infection. Sutures removed. Site now healed. Transition to wakling in CMO   -Left side also excisionally debrided. Cont to keep site covered. Ideally will heal as patient's gait evens out. Has been primarily applying pressure to left foot during healing process of right foot.   -No signs of infection to either foot.   -Plan for follow up in 3 weeks. Discussed transition to full acitivyt during that time. Week one: wearing CMOs to both feet, walking more at work (), possibly going to driving range. Week 2: using cart and attempting to golf, walking around the block. Etc   -Cont to closely monitor feet during this time.     SHRUTHI Rios  Department of Podiatry/Foot & Ankle Surgery                Again, thank you for allowing me to participate in the care of your patient.        Sincerely,        Tiny Soto DPM

## 2023-04-25 ENCOUNTER — TELEPHONE (OUTPATIENT)
Dept: PODIATRY | Facility: CLINIC | Age: 63
End: 2023-04-25
Payer: COMMERCIAL

## 2023-04-25 NOTE — TELEPHONE ENCOUNTER
Received refill request from [facility] pharmacy in [facility] on patient for Santyl.  Denied request as I have not seen patient in over 3 months.      Faxed back to pharmacy.    Rachelle Manriquez DPM

## 2023-05-08 ENCOUNTER — MEDICAL CORRESPONDENCE (OUTPATIENT)
Dept: HEALTH INFORMATION MANAGEMENT | Facility: CLINIC | Age: 63
End: 2023-05-08
Payer: COMMERCIAL

## 2023-05-24 ENCOUNTER — OFFICE VISIT (OUTPATIENT)
Dept: PODIATRY | Facility: CLINIC | Age: 63
End: 2023-05-24
Payer: COMMERCIAL

## 2023-05-24 VITALS — WEIGHT: 226 LBS | DIASTOLIC BLOOD PRESSURE: 68 MMHG | SYSTOLIC BLOOD PRESSURE: 132 MMHG | BODY MASS INDEX: 31.52 KG/M2

## 2023-05-24 DIAGNOSIS — S98.111A AMPUTATION OF RIGHT GREAT TOE (H): ICD-10-CM

## 2023-05-24 DIAGNOSIS — E11.621 DIABETIC ULCER OF TOE OF LEFT FOOT ASSOCIATED WITH TYPE 2 DIABETES MELLITUS, WITH FAT LAYER EXPOSED (H): ICD-10-CM

## 2023-05-24 DIAGNOSIS — L97.512 RIGHT FOOT ULCER, WITH FAT LAYER EXPOSED (H): Primary | ICD-10-CM

## 2023-05-24 DIAGNOSIS — L97.522 DIABETIC ULCER OF TOE OF LEFT FOOT ASSOCIATED WITH TYPE 2 DIABETES MELLITUS, WITH FAT LAYER EXPOSED (H): ICD-10-CM

## 2023-05-24 DIAGNOSIS — Z98.890 S/P FOOT SURGERY, RIGHT: ICD-10-CM

## 2023-05-24 PROCEDURE — 99213 OFFICE O/P EST LOW 20 MIN: CPT | Mod: 25 | Performed by: PODIATRIST

## 2023-05-24 PROCEDURE — 11042 DBRDMT SUBQ TIS 1ST 20SQCM/<: CPT | Performed by: PODIATRIST

## 2023-05-24 NOTE — PROGRESS NOTES
"[place] PODIATRY/FOOT & ANKLE SURGERY  CLINIC NOTE    CHIEF COMPLAINT:  right foot    PATIENT HISTORY:  Crispin Rojas is a 62 year old male who follows up for right foot. Had third metatarsal head excision and fourth metatarsal osteotomy on 1/19/23. He's been on IV abx per ID due to possible residual osteomyelitis that was pseudomonas resistant to all oral options. He's now being transition to oral abx. Since last appt, he states he feels better overall, being off abx. States very little drainage to right foot. Denies pain. Denies N/F/V/C/D.  -At last appt, sutures applied to right foot. States they're intact and site has been totally dry. States left side was worsening since last appt, but it's not improved. States is still limiting walking and applying majority of weight to left side. Denies N/F/V/C/D. Had recent follow up with ID  -Since last appt, has been back to work and on his feet quite a bit.  has been working 10+ hours a day. States some drainage from both feet, but not significant. States has been wearing inserts in shoes. Some drainage from right and left foot. Nontender. States \"feels great\" overall.     Review of Systems:  A 10 point review of systems was performed and is positive for that noted above in the patient history.  All other areas are negative.     PAST MEDICAL HISTORY:   Past Medical History:   Diagnosis Date     Ascending aorta dilatation (H)      Cerebral infarction (H)     2010     Gastroesophageal reflux disease with esophagitis      H/O blood clots 2022     Hyperlipidemia LDL goal <70      Hypertension      Nonalcoholic steatohepatitis 11/30/2022     Numbness and tingling      Obesity      GILA (obstructive sleep apnea) 10/22/2018    CPAP intolerant     PVD (peripheral vascular disease) (H)     s/p left partial toe amputation     Renal stone      Tremor      Type 2 diabetes mellitus with diabetic polyneuropathy, with long-term current use of insulin (H)         PAST SURGICAL " HISTORY:   Past Surgical History:   Procedure Laterality Date     AMPUTATE FOOT Left 8/18/2016    Procedure: AMPUTATE FOOT;  Surgeon: Jack Younger DPM;  [facility]     AMPUTATE TOE(S) Right 2/1/2016    Procedure: AMPUTATE TOE(S);  Surgeon: Rachelle Manriquez DPM, Pod;  [facility]: [facility]     AMPUTATE TOE(S) Right 8/2/2019    Procedure: Right fourth toe partial amputation for treatment of osteomyelitis.;  Surgeon: Jack Younger DPM;  [facility]     AMPUTATE TOE(S) Left 11/8/2019    Procedure: Left second toe amputation at the metatarsophalangeal joint.;  Surgeon: Rachelle Manriquez DPM, Podiatry/Foot and Ankle Surgery;  [facility]     AMPUTATE TOE(S) Right 6/5/2022    Procedure: AMPUTATION OF RIGHT GREAT TOE;  Surgeon: Wili Quiles DPM;  [facility]     AMPUTATE TOE(S) Right 7/28/2022    Procedure: Right foot partial first metatarsal resection;  Surgeon: Tiny Soto DPM;  Location: [facility]     ANGIOGRAM       BIOPSY BONE FOOT Right 7/24/2022    Procedure: Bone biopsy, right first metatarsal.;  Surgeon: Valentino Molina DPM;  Location: [facility]     COLONOSCOPY       COMBINED CYSTOSCOPY, RETROGRADES, URETEROSCOPY, INSERT STENT Left 8/21/2016    Procedure: COMBINED CYSTOSCOPY, RETROGRADES, URETEROSCOPY, INSERT STENT;  Surgeon: Artemio Valenzuela MD;  [facility]     COMBINED CYSTOSCOPY, RETROGRADES, URETEROSCOPY, LASER HOLMIUM LITHOTRIPSY URETER(S), INSERT STENT Left 10/3/2016    Procedure: COMBINED CYSTOSCOPY, RETROGRADES, URETEROSCOPY, LASER HOLMIUM LITHOTRIPSY URETER(S), INSERT STENT;  Surgeon: Artemio Valenzuela MD;  [facility]     EXTRACORPOREAL SHOCK WAVE LITHOTRIPSY (ESWL) Left 9/8/2016    Procedure: EXTRACORPOREAL SHOCK WAVE LITHOTRIPSY (ESWL);  Surgeon: Artemio Valenzuela MD;  Location: [facility]     INCISION AND DRAINAGE FOOT, COMBINED Right 7/24/2022    Procedure: Incision and drainage, right foot ;  Surgeon: Valentino Molina DPM;  [facility]      OSTEOTOMY FOOT Right 11/30/2022    Procedure: 1. Right second metatarsal floating osteotomy  2. Right second digit proximal phalanx arthroplasty 3. Right percutaneous flexor tenotomy;  Surgeon: Tiny Soto DPM;  [facility]     OSTEOTOMY FOOT Right 1/19/2023    Procedure: Right foot third metatarsal head excision, ulcer debridement, matatarsal osteotomies;  Surgeon: Tiny Soto DPM;  [facility]     RECESSION GASTROCNEMIUS Right 2/1/2016    Procedure: RECESSION GASTROCNEMIUS;  Surgeon: Rachelle Manriquez DPM, Pod;  [facility]        MEDICATIONS:  Reviewed in Epic.     ALLERGIES:    Allergies   Allergen Reactions     Statins Swelling     Other reaction(s): Other (see comments)  SIMCOR caused edema in extremities       Bactrim [Sulfamethoxazole-Trimethoprim] Nausea and Vomiting     Sulfa Antibiotics Nausea     Niacin Swelling     SIMCOR caused edema in extremities     Simvastatin Swelling     SIMCOR caused edema in extremities        SOCIAL HISTORY:   Social History     Socioeconomic History     Marital status:      Spouse name: Sydney     Number of children: 2     Years of education: Not on file     Highest education level: Master's degree (e.g., MA, MS, Arleth, MEd, MSW, ELIANA)   Occupational History     Occupation: marketing     Employer: [employer]     Comment: [employer]   Tobacco Use     Smoking status: Never     Smokeless tobacco: Never   Vaping Use     Vaping status: Never Used   Substance and Sexual Activity     Alcohol use: Yes     Comment: rare---red wine 3x per month     Drug use: Never     Sexual activity: Not Currently     Partners: Female   Other Topics Concern     Parent/sibling w/ CABG, MI or angioplasty before 65F 55M? No   Social History Narrative     Not on file     Social Determinants of Health     Financial Resource Strain: Low Risk  (1/9/2023)    Overall Financial Resource Strain (CARDIA)      Difficulty of Paying Living Expenses: Not very hard   Food Insecurity:  No Food Insecurity (1/9/2023)    Hunger Vital Sign      Worried About Running Out of Food in the Last Year: Never true      Ran Out of Food in the Last Year: Never true   Transportation Needs: No Transportation Needs (1/9/2023)    PRAPARE - Transportation      Lack of Transportation (Medical): No      Lack of Transportation (Non-Medical): No   Physical Activity: Sufficiently Active (1/9/2023)    Exercise Vital Sign      Days of Exercise per Week: 5 days      Minutes of Exercise per Session: 50 min   Stress: No Stress Concern Present (1/9/2023)    [employer] [place] of Occupational Health - Occupational Stress Questionnaire      Feeling of Stress : Not at all   Social Connections: Moderately Integrated (1/9/2023)    Social Connection and Isolation Panel [NHANES]      Frequency of Communication with Friends and Family: Twice a week      Frequency of Social Gatherings with Friends and Family: Never      Attends Gnosticism Services: More than 4 times per year      Active Member of Clubs or Organizations: Yes      Attends Club or Organization Meetings: Not on file      Marital Status:    Intimate Partner Violence: Not on file   Housing Stability: Low Risk  (1/9/2023)    Housing Stability Vital Sign      Unable to Pay for Housing in the Last Year: No      Number of Places Lived in the Last Year: 2      Unstable Housing in the Last Year: No        FAMILY HISTORY:   Family History   Problem Relation Age of Onset     Arthritis Mother         SLE     Connective Tissue Disorder Mother         lupus     Diabetes Mother      Cerebrovascular Disease Mother      Cancer Mother      Diabetes Father      Coronary Artery Disease Father      Chronic Obstructive Pulmonary Disease Father      Diabetes Sister      Diabetes Maternal Grandfather         EXAM:Vitals: /68   Wt 102.5 kg (226 lb)   BMI 31.52 kg/m    BMI= Body mass index is 31.52 kg/m .      General appearance: Patient is alert and fully cooperative with history &  exam.  No sign of distress is noted during the visit.      Respiratory: Breathing is regular & unlabored while sitting.      HEENT: Hearing is intact to spoken word.  Speech is clear.  No gross evidence of visual impairment that would impact ambulation.      Dermatologic:  Right foot: submet three ulcer --> site has reopened since last visit. Measures approx 2 cm x .5 cm x .2 cm. Granular wound bed.   Left foot submet 3 fissure. Granular wound bed. As below. Measures 5 cm x .3 cm x .2 cm      Vascular: Dorsalis pedis and posterior tibial pulses are intact & regular bilaterally.  CFT and skin temperature is normal to both lower extremities.       Neurologic: Lower extremity sensation is diminished, bilateral foot, to light touch.  No evidence of neurological-based weakness or contracture in the lower extremities.       Musculoskeletal: Patient is ambulatory without an assistive device or brace.  S/p right first ray resection. Right 2nd digit --> now more rectus.   S/p left hallux and second digit amputations     Psychiatric: Affect is pleasant & appropriate.      Cultures:     Foot, Right; Tissue    0 Result Notes  Culture 2+ Pseudomonas aeruginosa Abnormal        Isolated in broth only Enterococcus faecalis Abnormal     On day 1 of incubation   1+ Normal josé            Resulting Agency: [facility]     Susceptibility     Pseudomonas aeruginosa Enterococcus faecalis     JEFFREY JEFFREY     Amikacin <=2 ug/mL Susceptible       Ampicillin   <=2 ug/mL Susceptible     Cefepime <=1 ug/mL Susceptible       Ceftazidime <=1 ug/mL Susceptible       Ciprofloxacin 2 ug/mL Resistant       Gentamicin <=1 ug/mL Susceptible       Gentamicin Synergy   Susceptible... Susceptible 1     Levofloxacin >=8 ug/mL Resistant       Meropenem 1 ug/mL Susceptible       Penicillin   2 ug/mL Susceptible     Piperacillin/Tazobactam <=4 ug/mL Susceptible       Tobramycin <=1 ug/mL Susceptible       Vancomycin   1 ug/mL Susceptible                 Foot, Right;  Tissue    0 Result Notes  Culture 4+ Bacteroides fragilis Abnormal     Susceptibilities not routinely done   2+ Anaerococcus species Abnormal     Susceptibilities not routinely done        Pathology:     Final Diagnosis   Right third toe, metatarsal head, amputation-  -Bone with changes consistent with extensive acute osteomyelitis.                   PROCEDURE:   Verbal consent was obtained for debridement. Done on left foot today. A 15 blade was used to excise the nonivable tissue to the ulcer, down to and including subcutaneous tissue. No noted purulence afterwards. Right and left feet were debrided. Measurements are 5 cm x .3 cm x .2 cm  Debrided site measures .5 x .5 x .2 cm and 2 cm x .5 cm x .2 cm. No probe to bone. Well tolerated. Site was cleaned with alcohol.      ASSESSMENT:  1. S/p right foot third head resection, fourth metatarsal floating osteotomy, ulcer debridement  --> being treated for residual osteomyelitis, 1/19/23    -Site now reopened  2. Left foot submet three preulcerative lesion   -Site slightly worse today. Depth to subcutaenous tissue. Debrided       MEDICAL DECISION MAKING:   -Patient seen for first time in approx 1 month. Has been up to full activity and is spending significant time on his feet.   -Overall, doing well. Ulcers are slightly larger, but minimal drainage and no signs of infection. They're at a bearable level/size currently.   -Discussed minimal activity when not at work.   -Discussed need to closely monitor sites. If they get worse, may need more aggressive offloading.   -Debrided sites as above   -Discussed continued dressing plan   -Cont CMOs   -Cont glycemic control     Tiny Soto DPM   [place] [facility]/Foot & Ankle [facility]

## 2023-05-24 NOTE — LETTER
"    5/24/2023         RE: Crispin Rojas  [address] [facility] [address]        Dear Colleague,    Thank you for referring your patient, Crispin Rojas, to the [facility] POD[facility]. Please see a copy of my visit note below.    [place] PODIATRY/FOOT & ANKLE SURGERY  CLINIC NOTE    CHIEF COMPLAINT:  right foot    PATIENT HISTORY:  Crispin Rojas is a 62 year old male who follows up for right foot. Had third metatarsal head excision and fourth metatarsal osteotomy on 1/19/23. He's been on IV abx per ID due to possible residual osteomyelitis that was pseudomonas resistant to all oral options. He's now being transition to oral abx. Since last appt, he states he feels better overall, being off abx. States very little drainage to right foot. Denies pain. Denies N/F/V/C/D.  -At last appt, sutures applied to right foot. States they're intact and site has been totally dry. States left side was worsening since last appt, but it's not improved.  is still limiting walking and applying majority of weight to left side. Denies N/F/V/C/D. Had recent follow up with ID  -Since last appt, has been back to work and on his feet quite a bit.  has been working 10+ hours a day. States some drainage from both feet, but not significant.  has been wearing inserts in shoes. Some drainage from right and left foot. Nontender. States \"feels great\" overall.     Review of Systems:  A 10 point review of systems was performed and is positive for that noted above in the patient history.  All other areas are negative.     PAST MEDICAL HISTORY:   Past Medical History:   Diagnosis Date     Ascending aorta dilatation (H)      Cerebral infarction (H)     2010     Gastroesophageal reflux disease with esophagitis      H/O blood clots 2022     Hyperlipidemia LDL goal <70      Hypertension      Nonalcoholic steatohepatitis 11/30/2022     Numbness and tingling      Obesity      GILA (obstructive sleep apnea) " 10/22/2018    CPAP intolerant     PVD (peripheral vascular disease) (H)     s/p left partial toe amputation     Renal stone      Tremor      Type 2 diabetes mellitus with diabetic polyneuropathy, with long-term current use of insulin (H)         PAST SURGICAL HISTORY:   Past Surgical History:   Procedure Laterality Date     AMPUTATE FOOT Left 8/18/2016    Procedure: AMPUTATE FOOT;  Surgeon: Jack Younger DPM;  [facility]     AMPUTATE TOE(S) Right 2/1/2016    Procedure: AMPUTATE TOE(S);  Surgeon: Rachelle Manriquez DPM, Pod;  [facility]     AMPUTATE TOE(S) Right 8/2/2019    Procedure: Right fourth toe partial amputation for treatment of osteomyelitis.;  Surgeon: Jack Younger DPM;  [facility]     AMPUTATE TOE(S) Left 11/8/2019    Procedure: Left second toe amputation at the metatarsophalangeal joint.;  Surgeon: Rachelle Manriquez DPM, Podiatry/Foot and Ankle Surgery;  [facility]: [facility]     AMPUTATE TOE(S) Right 6/5/2022    Procedure: AMPUTATION OF RIGHT GREAT TOE;  Surgeon: Wili Quiles DPM;  [facility]: [facility]     AMPUTATE TOE(S) Right 7/28/2022    Procedure: Right foot partial first metatarsal resection;  Surgeon: Tiny Soto DPM;  [facility]: [facility]     ANGIOGRAM       BIOPSY BONE FOOT Right 7/24/2022    Procedure: Bone biopsy, right first metatarsal.;  Surgeon: Valentino Molina DPM;  [facility]: [facility]     COLONOSCOPY       COMBINED CYSTOSCOPY, RETROGRADES, URETEROSCOPY, INSERT STENT Left 8/21/2016    Procedure: COMBINED CYSTOSCOPY, RETROGRADES, URETEROSCOPY, INSERT STENT;  Surgeon: Artemio Valenzuela MD;  [facility]     COMBINED CYSTOSCOPY, RETROGRADES, URETEROSCOPY, LASER HOLMIUM LITHOTRIPSY URETER(S), INSERT STENT Left 10/3/2016    Procedure: COMBINED CYSTOSCOPY, RETROGRADES, URETEROSCOPY, LASER HOLMIUM LITHOTRIPSY URETER(S), INSERT STENT;  Surgeon: Artemio Valenzuela MD;  [facility]     EXTRACORPOREAL SHOCK WAVE LITHOTRIPSY (ESWL) Left 9/8/2016    Procedure:  EXTRACORPOREAL SHOCK WAVE LITHOTRIPSY (ESWL);  Surgeon: Artemio Valenzuela MD;  [facility]     INCISION AND DRAINAGE FOOT, COMBINED Right 7/24/2022    Procedure: Incision and drainage, right foot ;  Surgeon: Valentino Molina DPM;  [facility]: [facility]     OSTEOTOMY FOOT Right 11/30/2022    Procedure: 1. Right second metatarsal floating osteotomy  2. Right second digit proximal phalanx arthroplasty 3. Right percutaneous flexor tenotomy;  Surgeon: Tiny Soto DPM;  [facility]     OSTEOTOMY FOOT Right 1/19/2023    Procedure: Right foot third metatarsal head excision, ulcer debridement, matatarsal osteotomies;  Surgeon: Tiny Soto DPM;  [facility]     RECESSION GASTROCNEMIUS Right 2/1/2016    Procedure: RECESSION GASTROCNEMIUS;  Surgeon: Rachelle Manriquez DPM, Pod;  [facility]: [facility]        MEDICATIONS:  Reviewed in Epic.     ALLERGIES:    Allergies   Allergen Reactions     Statins Swelling     Other reaction(s): Other (see comments)  SIMCOR caused edema in extremities       Bactrim [Sulfamethoxazole-Trimethoprim] Nausea and Vomiting     Sulfa Antibiotics Nausea     Niacin Swelling     SIMCOR caused edema in extremities     Simvastatin Swelling     SIMCOR caused edema in extremities        SOCIAL HISTORY:   Social History     Socioeconomic History     Marital status:      Spouse name: Sydney     Number of children: 2     Years of education: Not on file     Highest education level: Master's degree (e.g., MA, MS, Arleth, MEd, MSW, ELIANA)   Occupational History     Occupation: marketing     Employer: [employer]     Comment: [employer]   Tobacco Use     Smoking status: Never     Smokeless tobacco: Never   Vaping Use     Vaping status: Never Used   Substance and Sexual Activity     Alcohol use: Yes     Comment: rare---red wine 3x per month     Drug use: Never     Sexual activity: Not Currently     Partners: Female   Other Topics Concern     Parent/sibling w/ CABG, MI or angioplasty before  65F 55M? No   Social History Narrative     Not on file     Social Determinants of Health     Financial Resource Strain: Low Risk  (1/9/2023)    Overall Financial Resource Strain (CARDIA)      Difficulty of Paying Living Expenses: Not very hard   Food Insecurity: No Food Insecurity (1/9/2023)    Hunger Vital Sign      Worried About Running Out of Food in the Last Year: Never true      Ran Out of Food in the Last Year: Never true   Transportation Needs: No Transportation Needs (1/9/2023)    PRAPARE - Transportation      Lack of Transportation (Medical): No      Lack of Transportation (Non-Medical): No   Physical Activity: Sufficiently Active (1/9/2023)    Exercise Vital Sign      Days of Exercise per Week: 5 days      Minutes of Exercise per Session: 50 min   Stress: No Stress Concern Present (1/9/2023)    [place] of Occupational Health - Occupational Stress Questionnaire      Feeling of Stress : Not at all   Social Connections: Moderately Integrated (1/9/2023)    Social Connection and Isolation Panel [NHANES]      Frequency of Communication with Friends and Family: Twice a week      Frequency of Social Gatherings with Friends and Family: Never      Attends Church Services: More than 4 times per year      Active Member of Clubs or Organizations: Yes      Attends Club or Organization Meetings: Not on file      Marital Status:    Intimate Partner Violence: Not on file   Housing Stability: Low Risk  (1/9/2023)    Housing Stability Vital Sign      Unable to Pay for Housing in the Last Year: No      Number of Places Lived in the Last Year: 2      Unstable Housing in the Last Year: No        FAMILY HISTORY:   Family History   Problem Relation Age of Onset     Arthritis Mother         SLE     Connective Tissue Disorder Mother         lupus     Diabetes Mother      Cerebrovascular Disease Mother      Cancer Mother      Diabetes Father      Coronary Artery Disease Father      Chronic Obstructive Pulmonary  Disease Father      Diabetes Sister      Diabetes Maternal Grandfather         EXAM:Vitals: /68   Wt 102.5 kg (226 lb)   BMI 31.52 kg/m    BMI= Body mass index is 31.52 kg/m .      General appearance: Patient is alert and fully cooperative with history & exam.  No sign of distress is noted during the visit.      Respiratory: Breathing is regular & unlabored while sitting.      HEENT: Hearing is intact to spoken word.  Speech is clear.  No gross evidence of visual impairment that would impact ambulation.      Dermatologic:  Right foot: submet three ulcer --> site has reopened since last visit. Measures approx 2 cm x .5 cm x .2 cm. Granular wound bed.   Left foot submet 3 fissure. Granular wound bed. As below. Measures 5 cm x .3 cm x .2 cm      Vascular: Dorsalis pedis and posterior tibial pulses are intact & regular bilaterally.  CFT and skin temperature is normal to both lower extremities.       Neurologic: Lower extremity sensation is diminished, bilateral foot, to light touch.  No evidence of neurological-based weakness or contracture in the lower extremities.       Musculoskeletal: Patient is ambulatory without an assistive device or brace.  S/p right first ray resection. Right 2nd digit --> now more rectus.   S/p left hallux and second digit amputations     Psychiatric: Affect is pleasant & appropriate.      Cultures:     Foot, Right; Tissue    0 Result Notes  Culture 2+ Pseudomonas aeruginosa Abnormal        Isolated in broth only Enterococcus faecalis Abnormal     On day 1 of incubation   1+ Normal josé            Resulting Agency: [facility]     Susceptibility     Pseudomonas aeruginosa Enterococcus faecalis     JEFFREY JEFFREY     Amikacin <=2 ug/mL Susceptible       Ampicillin   <=2 ug/mL Susceptible     Cefepime <=1 ug/mL Susceptible       Ceftazidime <=1 ug/mL Susceptible       Ciprofloxacin 2 ug/mL Resistant       Gentamicin <=1 ug/mL Susceptible       Gentamicin Synergy   Susceptible... Susceptible 1      Levofloxacin >=8 ug/mL Resistant       Meropenem 1 ug/mL Susceptible       Penicillin   2 ug/mL Susceptible     Piperacillin/Tazobactam <=4 ug/mL Susceptible       Tobramycin <=1 ug/mL Susceptible       Vancomycin   1 ug/mL Susceptible                 Foot, Right; Tissue    0 Result Notes  Culture 4+ Bacteroides fragilis Abnormal     Susceptibilities not routinely done   2+ Anaerococcus species Abnormal     Susceptibilities not routinely done        Pathology:     Final Diagnosis   Right third toe, metatarsal head, amputation-  -Bone with changes consistent with extensive acute osteomyelitis.                   PROCEDURE:   Verbal consent was obtained for debridement. Done on left foot today. A 15 blade was used to excise the nonivable tissue to the ulcer, down to and including subcutaneous tissue. No noted purulence afterwards. Right and left feet were debrided. Measurements are 5 cm x .3 cm x .2 cm  Debrided site measures .5 x .5 x .2 cm and 2 cm x .5 cm x .2 cm. No probe to bone. Well tolerated. Site was cleaned with alcohol.      ASSESSMENT:  1. S/p right foot third head resection, fourth metatarsal floating osteotomy, ulcer debridement  --> being treated for residual osteomyelitis, 1/19/23    -Site now reopened  2. Left foot submet three preulcerative lesion   -Site slightly worse today. Depth to subcutaenous tissue. Debrided       MEDICAL DECISION MAKING:   -Patient seen for first time in approx 1 month. Has been up to full activity and is spending significant time on his feet.   -Overall, doing well. Ulcers are slightly larger, but minimal drainage and no signs of infection. They're at a bearable level/size currently.   -Discussed minimal activity when not at work.   -Discussed need to closely monitor sites. If they get worse, may need more aggressive offloading.   -Debrided sites as above   -Discussed continued dressing plan   -Cont CMOs   -Cont glycemic control     Tiny Soto DPM   [facility]  of Podiatry/Foot & Ankle Surgery                Again, thank you for allowing me to participate in the care of your patient.        Sincerely,        Tiny Soto DPM

## 2023-06-07 DIAGNOSIS — E11.52 TYPE 2 DIABETES MELLITUS WITH DIABETIC PERIPHERAL ANGIOPATHY AND GANGRENE, WITH LONG-TERM CURRENT USE OF INSULIN (H): ICD-10-CM

## 2023-06-07 DIAGNOSIS — Z79.4 TYPE 2 DIABETES MELLITUS WITH DIABETIC PERIPHERAL ANGIOPATHY AND GANGRENE, WITH LONG-TERM CURRENT USE OF INSULIN (H): ICD-10-CM

## 2023-06-08 ENCOUNTER — TELEPHONE (OUTPATIENT)
Dept: ENDOCRINOLOGY | Facility: CLINIC | Age: 63
End: 2023-06-08
Payer: COMMERCIAL

## 2023-06-08 DIAGNOSIS — B35.0 TINEA CAPITIS DUE TO TRICHOPHYTON: ICD-10-CM

## 2023-06-08 RX ORDER — KETOCONAZOLE 20 MG/ML
SHAMPOO TOPICAL DAILY PRN
Qty: 120 ML | Refills: 1 | Status: SHIPPED | OUTPATIENT
Start: 2023-06-08 | End: 2023-07-10

## 2023-06-08 RX ORDER — INSULIN DEGLUDEC 100 U/ML
INJECTION, SOLUTION SUBCUTANEOUS
Qty: 30 ML | Refills: 0 | Status: SHIPPED | OUTPATIENT
Start: 2023-06-08 | End: 2023-06-09

## 2023-06-08 NOTE — TELEPHONE ENCOUNTER
[facility] Call Center    Phone Message    May a detailed message be left on voicemail: yes     Reason for Call: Medication Refill Request    Has the patient contacted the pharmacy for the refill? Yes     Name of medication being requested:   * insulin degludec (TRESIBA FLEXTOUCH) 100 UNIT/ML pen    Provider who prescribed the medication: Tarik    Pharmacy: [id number] - [place], [place] - [address] AT [address] [address] & [facility] [address] [facility]    Date medication is needed: 6/8/2023     Patient is needing this ASAP as he is out of medication       Action Taken: Message routed to:  [facility] Endo    Travel Screening: Not Applicable

## 2023-06-08 NOTE — TELEPHONE ENCOUNTER
Pt called to inform that refill was sent this am. Pt said it was a miscommunication from the pharmacy. Tresiba is out of stock but will arrive tomorrow for pt to fill.

## 2023-06-08 NOTE — TELEPHONE ENCOUNTER
"Requested Prescriptions   Pending Prescriptions Disp Refills     insulin degludec (TRESIBA FLEXTOUCH) 100 UNIT/ML pen [Pharmacy Med Name: TRESIBA FLEXTOUCH PEN ([id number]] 45 mL 0     Sig: ADMINISTER 50 TO 55 UNITS UNDER THE SKIN DAILY       Long Acting Insulin Protocol Failed - 6/7/2023  5:12 PM        Failed - HgbA1C in past 3 or 6 months     If HgbA1C is 8 or greater, it needs to be on file within the past 3 months.  If less than 8, must be on file within the past 6 months.     Recent Labs   Lab Test 11/29/22  1112   A1C 5.6             Passed - Serum creatinine on file in past 12 months     Recent Labs   Lab Test 03/10/23  1304 01/27/16  1518 01/25/16  0704   CR 0.86   < >  --    CREAT  --   --  0.8    < > = values in this interval not displayed.       Ok to refill medication if creatinine is low          Passed - Medication is active on med list        Passed - Patient is age 18 or older        Passed - Recent (6 mo) or future (30 days) visit within the authorizing provider's specialty     Patient had office visit in the last 6 months or has a visit in the next 30 days with authorizing provider or within the authorizing provider's specialty.  See \"Patient Info\" tab in inbasket, or \"Choose Columns\" in Meds & Orders section of the refill encounter.                 "

## 2023-06-09 DIAGNOSIS — E11.52 TYPE 2 DIABETES MELLITUS WITH DIABETIC PERIPHERAL ANGIOPATHY AND GANGRENE, WITH LONG-TERM CURRENT USE OF INSULIN (H): ICD-10-CM

## 2023-06-09 DIAGNOSIS — Z79.4 TYPE 2 DIABETES MELLITUS WITH DIABETIC PERIPHERAL ANGIOPATHY AND GANGRENE, WITH LONG-TERM CURRENT USE OF INSULIN (H): ICD-10-CM

## 2023-06-09 RX ORDER — INSULIN DEGLUDEC 100 U/ML
INJECTION, SOLUTION SUBCUTANEOUS
Qty: 45 ML | Refills: 0 | Status: SHIPPED | OUTPATIENT
Start: 2023-06-09 | End: 2023-08-03

## 2023-06-28 ENCOUNTER — OFFICE VISIT (OUTPATIENT)
Dept: PODIATRY | Facility: CLINIC | Age: 63
End: 2023-06-28
Payer: COMMERCIAL

## 2023-06-28 VITALS — WEIGHT: 226 LBS | DIASTOLIC BLOOD PRESSURE: 80 MMHG | SYSTOLIC BLOOD PRESSURE: 136 MMHG | BODY MASS INDEX: 31.52 KG/M2

## 2023-06-28 DIAGNOSIS — Z79.4 TYPE 2 DIABETES MELLITUS WITH FOOT ULCER, WITH LONG-TERM CURRENT USE OF INSULIN (H): ICD-10-CM

## 2023-06-28 DIAGNOSIS — S98.111A AMPUTATION OF RIGHT GREAT TOE (H): ICD-10-CM

## 2023-06-28 DIAGNOSIS — E11.621 TYPE 2 DIABETES MELLITUS WITH FOOT ULCER, WITH LONG-TERM CURRENT USE OF INSULIN (H): ICD-10-CM

## 2023-06-28 DIAGNOSIS — L97.512 RIGHT FOOT ULCER, WITH FAT LAYER EXPOSED (H): Primary | ICD-10-CM

## 2023-06-28 DIAGNOSIS — L97.521 ULCER OF LEFT FOOT, LIMITED TO BREAKDOWN OF SKIN (H): ICD-10-CM

## 2023-06-28 DIAGNOSIS — L97.509 TYPE 2 DIABETES MELLITUS WITH FOOT ULCER, WITH LONG-TERM CURRENT USE OF INSULIN (H): ICD-10-CM

## 2023-06-28 PROCEDURE — 99214 OFFICE O/P EST MOD 30 MIN: CPT | Mod: 25 | Performed by: PODIATRIST

## 2023-06-28 PROCEDURE — 11042 DBRDMT SUBQ TIS 1ST 20SQCM/<: CPT | Performed by: PODIATRIST

## 2023-06-28 NOTE — PATIENT INSTRUCTIONS
Thank you for choosing [facility] Podiatry / Foot & Ankle Surgery!    DR. SAGE'S CLINIC:  [facility] SCHEDULE SURGERY: [phone]   [address] BILLING QUESTIONS: [phone]   [place] [address] APPOINTMENTS: [phone]   PH: [phone] [facility] LINE:[phone]   FAX: [phone]      Follow up:   -4 weeks     Next steps:   -Cont dressing changes as you've been doing at home. Apply bozena (collagen) to right side. Apply betadine or alcohol to left side to try to dry wound edges.     -Call tilleges to ask about orthotic modifications. Right side needs further offloading at ulcer site, along third metatarsal. Left side also needs further offloading at ulcer sites, along distal aspects of third and fourth metatarsals      Flu vaccines are now available at all [facility] clinics and retail pharmacies across the [facility]. Appointments are required for clinic locations. To schedule an appointment online, please log into [employer] or create an account if you are a new user. You can also call [phone], or simply walk in at one of the [facility] retail pharmacy locations.

## 2023-06-28 NOTE — LETTER
"    6/28/2023         RE: Crispin Rojas  [address] [facility] [address]        Dear Colleague,    Thank you for referring your patient, Crispin Rojas, to the [facility] PODI[facility]. Please see a copy of my visit note below.    [place] PODIATRY/FOOT & ANKLE SURGERY  CLINIC NOTE    CHIEF COMPLAINT:  right foot    PATIENT HISTORY:  Crispin Rojas is a 62 year old male who follows up for right foot. Had third metatarsal head excision and fourth metatarsal osteotomy on 1/19/23. He's been on IV abx per ID due to possible residual osteomyelitis that was pseudomonas resistant to all oral options. He's now being transition to oral abx. Since last appt, he states he feels better overall, being off abx. States very little drainage to right foot. Denies pain. Denies N/F/V/C/D.  -At last appt, sutures applied to right foot. States they're intact and site has been totally dry. States left side was worsening since last appt, but it's not improved.  is still limiting walking and applying majority of weight to left side. Denies N/F/V/C/D. Had recent follow up with ID  -Since last appt, has been back to work and on his feet quite a bit.  has been working 10+ hours a day. States some drainage from both feet, but not significant.  has been wearing inserts in shoes. Some drainage from right and left foot. Nontender. States \"feels great\" overall.     -Patient follows up for b/l feet.  has been doing well and has no complaints.  has been walking significantly and golfing daily. States some drainage from both feet, right more than left. States antibiotics have been lowered to one Augmentin daily.     Review of Systems:  A 10 point review of systems was performed and is positive for that noted above in the patient history.  All other areas are negative.     PAST MEDICAL HISTORY:   Past Medical History:   Diagnosis Date     Ascending aorta dilatation (H)      Cerebral " infarction (H)     2010     Gastroesophageal reflux disease with esophagitis      H/O blood clots 2022     Hyperlipidemia LDL goal <70      Hypertension      Nonalcoholic steatohepatitis 11/30/2022     Numbness and tingling      Obesity      GILA (obstructive sleep apnea) 10/22/2018    CPAP intolerant     PVD (peripheral vascular disease) (H)     s/p left partial toe amputation     Renal stone      Tremor      Type 2 diabetes mellitus with diabetic polyneuropathy, with long-term current use of insulin (H)         PAST SURGICAL HISTORY:   Past Surgical History:   Procedure Laterality Date     AMPUTATE FOOT Left 8/18/2016    Procedure: AMPUTATE FOOT;  Surgeon: Jack Younger DPM;  Location: [facility] OR     AMPUTATE TOE(S) Right 2/1/2016    Procedure: AMPUTATE TOE(S);  Surgeon: Rachelle Manriquez DPM, Pod;  Location: [facility]     AMPUTATE TOE(S) Right 8/2/2019    Procedure: Right fourth toe partial amputation for treatment of osteomyelitis.;  Surgeon: Jack Younger DPM;  Location: [facility]     AMPUTATE TOE(S) Left 11/8/2019    Procedure: Left second toe amputation at the metatarsophalangeal joint.;  Surgeon: Rachelle Manriquez DPM, Podiatry/Foot and Ankle Surgery;  Location: [facility] OR     AMPUTATE TOE(S) Right 6/5/2022    Procedure: AMPUTATION OF RIGHT GREAT TOE;  Surgeon: Wili Quiles DPM;  Location: [facility]     AMPUTATE TOE(S) Right 7/28/2022    Procedure: Right foot partial first metatarsal resection;  Surgeon: Tiny Soto DPM;  Location: [facility] OR     ANGIOGRAM       BIOPSY BONE FOOT Right 7/24/2022    Procedure: Bone biopsy, right first metatarsal.;  Surgeon: Valentino Molina DPM;  Location: [facility]     COLONOSCOPY       COMBINED CYSTOSCOPY, RETROGRADES, URETEROSCOPY, INSERT STENT Left 8/21/2016    Procedure: COMBINED CYSTOSCOPY, RETROGRADES, URETEROSCOPY, INSERT STENT;  Surgeon: Artemio Valenzuela MD;  Location: [facility] OR     COMBINED CYSTOSCOPY, RETROGRADES, URETEROSCOPY, LASER HOLMIUM LITHOTRIPSY URETER(S),  INSERT STENT Left 10/3/2016    Procedure: COMBINED CYSTOSCOPY, RETROGRADES, URETEROSCOPY, LASER HOLMIUM LITHOTRIPSY URETER(S), INSERT STENT;  Surgeon: Artemio Valenzuela MD;  Location: [facility] OR     EXTRACORPOREAL SHOCK WAVE LITHOTRIPSY (ESWL) Left 9/8/2016    Procedure: EXTRACORPOREAL SHOCK WAVE LITHOTRIPSY (ESWL);  Surgeon: Artemio Valenzuela MD;  Location: [facility] OR     INCISION AND DRAINAGE FOOT, COMBINED Right 7/24/2022    Procedure: Incision and drainage, right foot ;  Surgeon: Valentino Molina DPM;  Location: [facility]     OSTEOTOMY FOOT Right 11/30/2022    Procedure: 1. Right second metatarsal floating osteotomy  2. Right second digit proximal phalanx arthroplasty 3. Right percutaneous flexor tenotomy;  Surgeon: Tiny Soto DPM;  Location: [facility]     OSTEOTOMY FOOT Right 1/19/2023    Procedure: Right foot third metatarsal head excision, ulcer debridement, matatarsal osteotomies;  Surgeon: Tiny Soto DPM;  Location: [facility] OR     RECESSION GASTROCNEMIUS Right 2/1/2016    Procedure: RECESSION GASTROCNEMIUS;  Surgeon: Rachelle Manriquez DPM, Pod;  Location: [facility] OR        MEDICATIONS:  Reviewed in Epic.     ALLERGIES:    Allergies   Allergen Reactions     Statins Swelling     Other reaction(s): Other (see comments)  SIMCOR caused edema in extremities       Bactrim [Sulfamethoxazole-Trimethoprim] Nausea and Vomiting     Sulfa Antibiotics Nausea     Niacin Swelling     SIMCOR caused edema in extremities     Simvastatin Swelling     SIMCOR caused edema in extremities        SOCIAL HISTORY:   Social History     Socioeconomic History     Marital status:      Spouse name: Sydney     Number of children: 2     Years of education: Not on file     Highest education level: Master's degree (e.g., MA, MS, [place], MEd, MSW, ELIANA)   Occupational History     Occupation: marketing     Employer: [employer]     Comment: [employer]   Tobacco Use     Smoking status: Never     Smokeless tobacco: Never   Vaping Use      Vaping Use: Never used   Substance and Sexual Activity     Alcohol use: Yes     Comment: rare---red wine 3x per month     Drug use: Never     Sexual activity: Not Currently     Partners: Female   Other Topics Concern     Parent/sibling w/ CABG, MI or angioplasty before 65F 55M? No   Social History Narrative     Not on file     Social Determinants of Health     Financial Resource Strain: Low Risk  (1/9/2023)    Overall Financial Resource Strain (CARDIA)      Difficulty of Paying Living Expenses: Not very hard   Food Insecurity: No Food Insecurity (1/9/2023)    Hunger Vital Sign      Worried About Running Out of Food in the Last Year: Never true      Ran Out of Food in the Last Year: Never true   Transportation Needs: No Transportation Needs (1/9/2023)    PRAPARE - Transportation      Lack of Transportation (Medical): No      Lack of Transportation (Non-Medical): No   Physical Activity: Sufficiently Active (1/9/2023)    Exercise Vital Sign      Days of Exercise per Week: 5 days      Minutes of Exercise per Session: 50 min   Stress: No Stress Concern Present (1/9/2023)    [employer] [place] [employer] - Occupational Stress Questionnaire      Feeling of Stress : Not at all   Social Connections: Moderately Integrated (1/9/2023)    Social Connection and Isolation Panel [NHANES]      Frequency of Communication with Friends and Family: Twice a week      Frequency of Social Gatherings with Friends and Family: Never      Attends Church Services: More than 4 times per year      Active Member of Clubs or Organizations: Yes      Attends Club or Organization Meetings: Not on file      Marital Status:    Intimate Partner Violence: Not on file   Housing Stability: Low Risk  (1/9/2023)    Housing Stability Vital Sign      Unable to Pay for Housing in the Last Year: No      Number of Places Lived in the Last Year: 2      Unstable Housing in the Last Year: No        FAMILY HISTORY:   Family History   Problem  Relation Age of Onset     Arthritis Mother         SLE     Connective Tissue Disorder Mother         lupus     Diabetes Mother      Cerebrovascular Disease Mother      Cancer Mother      Diabetes Father      Coronary Artery Disease Father      Chronic Obstructive Pulmonary Disease Father      Diabetes Sister      Diabetes Maternal Grandfather         EXAM:Vitals: /80   Wt 102.5 kg (226 lb)   BMI 31.52 kg/m    BMI= Body mass index is 31.52 kg/m .      General appearance: Patient is alert and fully cooperative with history & exam.  No sign of distress is noted during the visit.      Respiratory: Breathing is regular & unlabored while sitting.      HEENT: Hearing is intact to spoken word.  Speech is clear.  No gross evidence of visual impairment that would impact ambulation.      Dermatologic:  Right foot: submet three ulcer --> site similar in appearance, slightly bigger. Measures approx 1 cm x .5 cm x .2 cm. Granular wound bed. No probe to bone. Depth to subcutaneous tissue.No cellulitis or significant drainage  Left foot submet 3 fissure. More callus today. More macerated to wound edges Granular wound bed. As below. Measures 3 cm x .4 cm x .2 cm      Vascular: Dorsalis pedis and posterior tibial pulses are intact & regular bilaterally.  CFT and skin temperature is normal to both lower extremities.       Neurologic: Lower extremity sensation is diminished, bilateral foot, to light touch.  No evidence of neurological-based weakness or contracture in the lower extremities.       Musculoskeletal: Patient is ambulatory without an assistive device or brace.  S/p right first ray resection. Right 2nd digit --> now more rectus.   S/p left hallux and second digit amputations     Psychiatric: Affect is pleasant & appropriate.      Cultures:     Foot, Right; Tissue    0 Result Notes  Culture 2+ Pseudomonas aeruginosa Abnormal        Isolated in broth only Enterococcus faecalis Abnormal     On day 1 of incubation   1+  Normal josé            Resulting Agency: [facility]     Susceptibility     Pseudomonas aeruginosa Enterococcus faecalis     JEFFREY JEFFREY     Amikacin <=2 ug/mL Susceptible       Ampicillin   <=2 ug/mL Susceptible     Cefepime <=1 ug/mL Susceptible       Ceftazidime <=1 ug/mL Susceptible       Ciprofloxacin 2 ug/mL Resistant       Gentamicin <=1 ug/mL Susceptible       Gentamicin Synergy   Susceptible... Susceptible 1     Levofloxacin >=8 ug/mL Resistant       Meropenem 1 ug/mL Susceptible       Penicillin   2 ug/mL Susceptible     Piperacillin/Tazobactam <=4 ug/mL Susceptible       Tobramycin <=1 ug/mL Susceptible       Vancomycin   1 ug/mL Susceptible                 Foot, Right; Tissue    0 Result Notes  Culture 4+ Bacteroides fragilis Abnormal     Susceptibilities not routinely done   2+ Anaerococcus species Abnormal     Susceptibilities not routinely done        Pathology:     Final Diagnosis   Right third toe, metatarsal head, amputation-  -Bone with changes consistent with extensive acute osteomyelitis.                       PROCEDURE:   Verbal consent was obtained for debridement. Done on left foot today. A 15 blade was used to excise the nonivable tissue to the ulcer, down to and including subcutaneous tissue. No noted purulence afterwards. Right and left feet were debrided. Measurements are 1 cm x .5 cm x .2 cm and  3 cm x .4 cm x .2 cm .  No probe to bone. Well tolerated. Site was cleaned with alcohol.      ASSESSMENT:  1. S/p right foot third head resection, fourth metatarsal floating osteotomy, ulcer debridement  --> being treated for residual osteomyelitis, 1/19/23    -Site now open --> slightly larger  2. Left foot submet three preulcerative lesion   -Site slightly worse today. Depth to subcutaenous tissue. Debrided       MEDICAL DECISION MAKING:   -Patient seen for b/l feet. Patient has been doing daily or every other day dressing changes and walking/working for 10+ hours a day. He's quite happy to be able to  do this. Discussed though that he's at risk for worsening of his feet with all of this activity. Discussed benefits of lessening activity, he states he'd prefer to continue through the summer.   -Discussed option for TCC casting which would allow for activity and still offload sites. He declined this today   -Plans to continue to monitor. Sites manageable at this time. No signs of infection.   -Debrided sites as above.   -Discussed modifcation to CMOs that he currently has. Encouraged call and follow up with [facility] to see if more offloading could be done  -Plan to monitor closely for signs of infection, primarily to right foot.   -F/u in 3-4 weeks    Tiny Soto DPM   [facility]le [facility]    High complexity patient   Greater than 30 minutes were spent today, reviewing previous hospital records, counseling and educating the patient on the ongoing treatment plan and coordinating care.                   Again, thank you for allowing me to participate in the care of your patient.        Sincerely,        Tiny Soto DPM

## 2023-06-28 NOTE — PROGRESS NOTES
"[facility]  CLINIC NOTE    CHIEF COMPLAINT:  right foot    PATIENT HISTORY:  Crispin Rojas is a 62 year old male who follows up for right foot. Had third metatarsal head excision and fourth metatarsal osteotomy on 1/19/23. He's been on IV abx per ID due to possible residual osteomyelitis that was pseudomonas resistant to all oral options. He's now being transition to oral abx. Since last appt, he states he feels better overall, being off abx. States very little drainage to right foot. Denies pain. Denies N/F/V/C/D.  -At last appt, sutures applied to right foot. States they're intact and site has been totally dry. States left side was worsening since last appt, but it's not improved. States is still limiting walking and applying majority of weight to left side. Denies N/F/V/C/D. Had recent follow up with ID  -Since last appt, has been back to work and on his feet quite a bit.  has been working 10+ hours a day. States some drainage from both feet, but not significant.  has been wearing inserts in shoes. Some drainage from right and left foot. Nontender. States \"feels great\" overall.     -Patient follows up for b/l feet.  has been doing well and has no complaints.  has been walking significantly and golfing daily. States some drainage from both feet, right more than left. States antibiotics have been lowered to one Augmentin daily.     Review of Systems:  A 10 point review of systems was performed and is positive for that noted above in the patient history.  All other areas are negative.     PAST MEDICAL HISTORY:   Past Medical History:   Diagnosis Date     Ascending aorta dilatation (H)      Cerebral infarction (H)     2010     Gastroesophageal reflux disease with esophagitis      H/O blood clots 2022     Hyperlipidemia LDL goal <70      Hypertension      Nonalcoholic steatohepatitis 11/30/2022     Numbness and tingling      Obesity      GILA (obstructive sleep " apnea) 10/22/2018    CPAP intolerant     PVD (peripheral vascular disease) (H)     s/p left partial toe amputation     Renal stone      Tremor      Type 2 diabetes mellitus with diabetic polyneuropathy, with long-term current use of insulin (H)         PAST SURGICAL HISTORY:   Past Surgical History:   Procedure Laterality Date     AMPUTATE FOOT Left 8/18/2016    Procedure: AMPUTATE FOOT;  Surgeon: Jack Younger DPM;  Location: [facility]     AMPUTATE TOE(S) Right 2/1/2016    Procedure: AMPUTATE TOE(S);  Surgeon: Rachelle Manriquez DPM, Pod;  Location: [facility]     AMPUTATE TOE(S) Right 8/2/2019    Procedure: Right fourth toe partial amputation for treatment of osteomyelitis.;  Surgeon: Jack Younger DPM;  Location: [facility]     AMPUTATE TOE(S) Left 11/8/2019    Procedure: Left second toe amputation at the metatarsophalangeal joint.;  Surgeon: Rachelle Manriquez DPM, Podiatry/Foot and Ankle Surgery;  Location: [facility]     AMPUTATE TOE(S) Right 6/5/2022    Procedure: AMPUTATION OF RIGHT GREAT TOE;  Surgeon: Wili Quiles DPM;  Location: [facility]     AMPUTATE TOE(S) Right 7/28/2022    Procedure: Right foot partial first metatarsal resection;  Surgeon: Tiny Soto DPM;  Location: [facility] OR     ANGIOGRAM       BIOPSY BONE FOOT Right 7/24/2022    Procedure: Bone biopsy, right first metatarsal.;  Surgeon: Valentino Molina DPM;  Location: [facility] OR     COLONOSCOPY       COMBINED CYSTOSCOPY, RETROGRADES, URETEROSCOPY, INSERT STENT Left 8/21/2016    Procedure: COMBINED CYSTOSCOPY, RETROGRADES, URETEROSCOPY, INSERT STENT;  Surgeon: Artemio Valenzuela MD;  Location: [facility]     COMBINED CYSTOSCOPY, RETROGRADES, URETEROSCOPY, LASER HOLMIUM LITHOTRIPSY URETER(S), INSERT STENT Left 10/3/2016    Procedure: COMBINED CYSTOSCOPY, RETROGRADES, URETEROSCOPY, LASER HOLMIUM LITHOTRIPSY URETER(S), INSERT STENT;  Surgeon: Artemio Valenzuela MD;  Location: [facility]     EXTRACORPOREAL SHOCK WAVE LITHOTRIPSY (ESWL) Left 9/8/2016     Procedure: EXTRACORPOREAL SHOCK WAVE LITHOTRIPSY (ESWL);  Surgeon: Artemio Valenzuela MD;  Location: [facility] OR     INCISION AND DRAINAGE FOOT, COMBINED Right 7/24/2022    Procedure: Incision and drainage, right foot ;  Surgeon: Valentino Molina DPM;  Location: [facility] OR     OSTEOTOMY FOOT Right 11/30/2022    Procedure: 1. Right second metatarsal floating osteotomy  2. Right second digit proximal phalanx arthroplasty 3. Right percutaneous flexor tenotomy;  Surgeon: Tiny Soto DPM;  Location: [facility]     OSTEOTOMY FOOT Right 1/19/2023    Procedure: Right foot third metatarsal head excision, ulcer debridement, matatarsal osteotomies;  Surgeon: Tiny Soto DPM;  Location: [facility]     RECESSION GASTROCNEMIUS Right 2/1/2016    Procedure: RECESSION GASTROCNEMIUS;  Surgeon: Rachelle Manriquez DPM, Pod;  Location: [facility]        MEDICATIONS:  Reviewed in Epic.     ALLERGIES:    Allergies   Allergen Reactions     Statins Swelling     Other reaction(s): Other (see comments)  SIMCOR caused edema in extremities       Bactrim [Sulfamethoxazole-Trimethoprim] Nausea and Vomiting     Sulfa Antibiotics Nausea     Niacin Swelling     SIMCOR caused edema in extremities     Simvastatin Swelling     SIMCOR caused edema in extremities        SOCIAL HISTORY:   Social History     Socioeconomic History     Marital status:      Spouse name: Sydney     Number of children: 2     Years of education: Not on file     Highest education level: Master's degree (e.g., MA, MS, [place]le[place], MEd, MSW, ELIANA)   Occupational History     Occupation: marketing     Employer: [employer]     Comment: [employer]   Tobacco Use     Smoking status: Never     Smokeless tobacco: Never   Vaping Use     Vaping Use: Never used   Substance and Sexual Activity     Alcohol use: Yes     Comment: rare---red wine 3x per month     Drug use: Never     Sexual activity: Not Currently     Partners: Female   Other Topics Concern     Parent/sibling w/ CABG, MI or angioplasty  before 65F 55M? No   Social History Narrative     Not on file     Social Determinants of Health     Financial Resource Strain: Low Risk  (1/9/2023)    Overall Financial Resource Strain (CARDIA)      Difficulty of Paying Living Expenses: Not very hard   Food Insecurity: No Food Insecurity (1/9/2023)    Hunger Vital Sign      Worried About Running Out of Food in the Last Year: Never true      Ran Out of Food in the Last Year: Never true   Transportation Needs: No Transportation Needs (1/9/2023)    PRAPARE - Transportation      Lack of Transportation (Medical): No      Lack of Transportation (Non-Medical): No   Physical Activity: Sufficiently Active (1/9/2023)    Exercise Vital Sign      Days of Exercise per Week: 5 days      Minutes of Exercise per Session: 50 min   Stress: No Stress Concern Present (1/9/2023)    [employer] [place] [employer] - Occupational Stress Questionnaire      Feeling of Stress : Not at all   Social Connections: Moderately Integrated (1/9/2023)    Social Connection and Isolation Panel [NHANES]      Frequency of Communication with Friends and Family: Twice a week      Frequency of Social Gatherings with Friends and Family: Never      Attends [employer] Services: More than 4 times per year      Active Member of Clubs or Organizations: Yes      Attends Club or Organization Meetings: Not on file      Marital Status:    Intimate Partner Violence: Not on file   Housing Stability: Low Risk  (1/9/2023)    Housing Stability Vital Sign      Unable to Pay for Housing in the Last Year: No      Number of Places Lived in the Last Year: 2      Unstable Housing in the Last Year: No        FAMILY HISTORY:   Family History   Problem Relation Age of Onset     Arthritis Mother         SLE     Connective Tissue Disorder Mother         lupus     Diabetes Mother      Cerebrovascular Disease Mother      Cancer Mother      Diabetes Father      Coronary Artery Disease Father      Chronic Obstructive  Pulmonary Disease Father      Diabetes Sister      Diabetes Maternal Grandfather         EXAM:Vitals: /80   Wt 102.5 kg (226 lb)   BMI 31.52 kg/m    BMI= Body mass index is 31.52 kg/m .      General appearance: Patient is alert and fully cooperative with history & exam.  No sign of distress is noted during the visit.      Respiratory: Breathing is regular & unlabored while sitting.      HEENT: Hearing is intact to spoken word.  Speech is clear.  No gross evidence of visual impairment that would impact ambulation.      Dermatologic:  Right foot: submet three ulcer --> site similar in appearance, slightly bigger. Measures approx 1 cm x .5 cm x .2 cm. Granular wound bed. No probe to bone. Depth to subcutaneous tissue.No cellulitis or significant drainage  Left foot submet 3 fissure. More callus today. More macerated to wound edges Granular wound bed. As below. Measures 3 cm x .4 cm x .2 cm      Vascular: Dorsalis pedis and posterior tibial pulses are intact & regular bilaterally.  CFT and skin temperature is normal to both lower extremities.       Neurologic: Lower extremity sensation is diminished, bilateral foot, to light touch.  No evidence of neurological-based weakness or contracture in the lower extremities.       Musculoskeletal: Patient is ambulatory without an assistive device or brace.  S/p right first ray resection. Right 2nd digit --> now more rectus.   S/p left hallux and second digit amputations     Psychiatric: Affect is pleasant & appropriate.      Cultures:     Foot, Right; Tissue    0 Result Notes  Culture 2+ Pseudomonas aeruginosa Abnormal        Isolated in broth only Enterococcus faecalis Abnormal     On day 1 of incubation   1+ Normal josé            Resulting Agency: [facility]     Susceptibility     Pseudomonas aeruginosa Enterococcus faecalis     JEFFREY JEFFREY     Amikacin <=2 ug/mL Susceptible       Ampicillin   <=2 ug/mL Susceptible     Cefepime <=1 ug/mL Susceptible       Ceftazidime <=1 ug/mL  Susceptible       Ciprofloxacin 2 ug/mL Resistant       Gentamicin <=1 ug/mL Susceptible       Gentamicin Synergy   Susceptible... Susceptible 1     Levofloxacin >=8 ug/mL Resistant       Meropenem 1 ug/mL Susceptible       Penicillin   2 ug/mL Susceptible     Piperacillin/Tazobactam <=4 ug/mL Susceptible       Tobramycin <=1 ug/mL Susceptible       Vancomycin   1 ug/mL Susceptible                 Foot, Right; Tissue    0 Result Notes  Culture 4+ Bacteroides fragilis Abnormal     Susceptibilities not routinely done   2+ Anaerococcus species Abnormal     Susceptibilities not routinely done        Pathology:     Final Diagnosis   Right third toe, metatarsal head, amputation-  -Bone with changes consistent with extensive acute osteomyelitis.                       PROCEDURE:   Verbal consent was obtained for debridement. Done on left foot today. A 15 blade was used to excise the nonivable tissue to the ulcer, down to and including subcutaneous tissue. No noted purulence afterwards. Right and left feet were debrided. Measurements are 1 cm x .5 cm x .2 cm and  3 cm x .4 cm x .2 cm .  No probe to bone. Well tolerated. Site was cleaned with alcohol.      ASSESSMENT:  1. S/p right foot third head resection, fourth metatarsal floating osteotomy, ulcer debridement  --> being treated for residual osteomyelitis, 1/19/23    -Site now open --> slightly larger  2. Left foot submet three preulcerative lesion   -Site slightly worse today. Depth to subcutaenous tissue. Debrided       MEDICAL DECISION MAKING:   -Patient seen for b/l feet. Patient has been doing daily or every other day dressing changes and walking/working for 10+ hours a day. He's quite happy to be able to do this. Discussed though that he's at risk for worsening of his feet with all of this activity. Discussed benefits of lessening activity, he states he'd prefer to continue through the summer.   -Discussed option for TCC casting which would allow for activity and  still offload sites. He declined this today   -Plans to continue to monitor. Sites manageable at this time. No signs of infection.   -Debrided sites as above.   -Discussed modifcation to CMOs that he currently has. Encouraged call and follow up with [facility] to see if more offloading could be done  -Plan to monitor closely for signs of infection, primarily to right foot.   -F/u in 3-4 weeks    Tiny Soto DPM   [facility]    High complexity patient   Greater than 30 minutes were spent today, reviewing previous hospital records, counseling and educating the patient on the ongoing treatment plan and coordinating care.

## 2023-06-29 ENCOUNTER — TELEPHONE (OUTPATIENT)
Dept: OTOLARYNGOLOGY | Facility: CLINIC | Age: 63
End: 2023-06-29
Payer: COMMERCIAL

## 2023-06-29 NOTE — TELEPHONE ENCOUNTER
LVM for patient regarding rescheduling ENT appt from Dr. uTcker due to Dr. Tucker leaving [facility] patient can reschedule with Dr. Duarte or Dr. Armstrong at the [facility] or with a community provider. Left my direct line and call center number for scheduling needs.

## 2023-07-05 DIAGNOSIS — Z79.4 TYPE 2 DIABETES MELLITUS WITH DIABETIC PERIPHERAL ANGIOPATHY AND GANGRENE, WITH LONG-TERM CURRENT USE OF INSULIN (H): ICD-10-CM

## 2023-07-05 DIAGNOSIS — E11.52 TYPE 2 DIABETES MELLITUS WITH DIABETIC PERIPHERAL ANGIOPATHY AND GANGRENE, WITH LONG-TERM CURRENT USE OF INSULIN (H): ICD-10-CM

## 2023-07-06 RX ORDER — METFORMIN HCL 500 MG
TABLET, EXTENDED RELEASE 24 HR ORAL
Qty: 360 TABLET | Refills: 0 | Status: SHIPPED | OUTPATIENT
Start: 2023-07-06 | End: 2023-08-03

## 2023-07-06 NOTE — TELEPHONE ENCOUNTER
"Requested Prescriptions   Pending Prescriptions Disp Refills     metFORMIN (GLUCOPHAGE XR) 500 MG 24 hr tablet [Pharmacy Med Name: METFORMIN ER 500MG 24HR TABS] 360 tablet 0     Sig: TAKE 2 TABLETS BY MOUTH TWICE DAILY       Biguanide Agents Failed - 7/5/2023  7:53 PM        Failed - Patient has documented A1c within the specified period of time.     If HgbA1C is 8 or greater, it needs to be on file within the past 3 months.  If less than 8, must be on file within the past 6 months.     Recent Labs   Lab Test 11/29/22  1112   A1C 5.6             Passed - Patient is age 10 or older        Passed - Patient's CR is NOT>1.4 OR Patient's EGFR is NOT<45 within past 12 mos.     Recent Labs   Lab Test 03/10/23  1304 09/14/21  1421 05/10/21  1555   GFRESTIMATED >90   < > >90   GFRESTBLACK  --   --  >90    < > = values in this interval not displayed.       Recent Labs   Lab Test 03/10/23  1304   CR 0.86             Passed - Patient does NOT have a diagnosis of CHF.        Passed - Medication is active on med list        Passed - Recent (6 mo) or future (30 days) visit within the authorizing provider's specialty     Patient had office visit in the last 6 months or has a visit in the next 30 days with authorizing provider or within the authorizing provider's specialty.  See \"Patient Info\" tab in inbasket, or \"Choose Columns\" in Meds & Orders section of the refill encounter.                 "

## 2023-07-07 ENCOUNTER — TELEPHONE (OUTPATIENT)
Dept: ENDOCRINOLOGY | Facility: CLINIC | Age: 63
End: 2023-07-07

## 2023-07-07 DIAGNOSIS — E11.42 TYPE 2 DIABETES MELLITUS WITH PERIPHERAL NEUROPATHY (H): Primary | ICD-10-CM

## 2023-07-07 DIAGNOSIS — M86.171 ACUTE OSTEOMYELITIS OF RIGHT ANKLE OR FOOT (H): Primary | ICD-10-CM

## 2023-07-07 PROBLEM — M86.671 CHRONIC OSTEOMYELITIS OF RIGHT FOOT (H): Status: ACTIVE | Noted: 2022-08-18

## 2023-07-07 NOTE — TELEPHONE ENCOUNTER
General Call      Reason for Call:  Patient called to say he has a lab appointment on Monday, 7/10/23 at 0930.  Patient said he is looking at his work schedule and if there are any labs you may need before my next appointment with you on 8/3/23, it would be very helpful if you could place the lab orders before my lab appointment so they can draw labs at same time.   Patient said he has little time off work this summer.   Thank you.    What are your questions or concerns:      Date of last appointment with provider:     Could we send this information to you in [employer] or would you prefer to receive a phone call?:   Patient would prefer a phone call   Okay to leave a detailed message?: Yes at Cell number on file:    Telephone Information:   Mobile [phone]

## 2023-07-09 DIAGNOSIS — Z79.4 TYPE 2 DIABETES MELLITUS WITH DIABETIC PERIPHERAL ANGIOPATHY AND GANGRENE, WITH LONG-TERM CURRENT USE OF INSULIN (H): ICD-10-CM

## 2023-07-09 DIAGNOSIS — E11.52 TYPE 2 DIABETES MELLITUS WITH DIABETIC PERIPHERAL ANGIOPATHY AND GANGRENE, WITH LONG-TERM CURRENT USE OF INSULIN (H): ICD-10-CM

## 2023-07-09 NOTE — PATIENT INSTRUCTIONS
November 9, 2018    [address] [address]  [phone]  Health Coaching Progress Note    Patient Name: Crispin Rojas Date: November 9, 2018     Plan/Goal for the Next 4 Weeks:   GOAL #1: Be more aware of what you're eating at meals and snacks/work on reducing carbs, eat more fresh food items and add in more protein each day  GOAL #2: Keep working on sleep schedule/try to get close to 8 hours each night as able  GOAL #3: Schedule/attend annual physical with Dr. Lopez-New Goal  GOAL #4: Follow-up with Sleep study results as scheduled-New Goal  GOAL #5: Start regular workout routine at the gym after job is figured out and shoot for 4-5 days per week-New Goal      Plan: (Homework, other):  Patient was encouraged to continue to seek condition-related information and education, as well as schedule a follow up appointment with the Health  in 5 weeks. Patient has set self-identified goals and will monitor progress until the next appointment.  Scheduled our next coaching appointment for Friday December 21st at 8am.  Juan M Oshea       Resources:        
labs baseline. will admit for HD.

## 2023-07-10 ENCOUNTER — OFFICE VISIT (OUTPATIENT)
Dept: FAMILY MEDICINE | Facility: CLINIC | Age: 63
End: 2023-07-10
Payer: COMMERCIAL

## 2023-07-10 VITALS
WEIGHT: 224 LBS | RESPIRATION RATE: 18 BRPM | TEMPERATURE: 97.3 F | HEIGHT: 71 IN | DIASTOLIC BLOOD PRESSURE: 84 MMHG | HEART RATE: 50 BPM | SYSTOLIC BLOOD PRESSURE: 154 MMHG | OXYGEN SATURATION: 96 % | BODY MASS INDEX: 31.36 KG/M2

## 2023-07-10 DIAGNOSIS — K74.60 LIVER CIRRHOSIS SECONDARY TO NASH (H): ICD-10-CM

## 2023-07-10 DIAGNOSIS — L97.909 DIABETIC ULCER OF LOWER EXTREMITY (H): ICD-10-CM

## 2023-07-10 DIAGNOSIS — Z79.899 ON STATIN THERAPY: ICD-10-CM

## 2023-07-10 DIAGNOSIS — I10 BENIGN ESSENTIAL HYPERTENSION: ICD-10-CM

## 2023-07-10 DIAGNOSIS — D50.9 IRON DEFICIENCY ANEMIA, UNSPECIFIED IRON DEFICIENCY ANEMIA TYPE: ICD-10-CM

## 2023-07-10 DIAGNOSIS — B35.0 TINEA CAPITIS DUE TO TRICHOPHYTON: ICD-10-CM

## 2023-07-10 DIAGNOSIS — D64.9 LOW HEMOGLOBIN: ICD-10-CM

## 2023-07-10 DIAGNOSIS — E11.622 DIABETIC ULCER OF LOWER EXTREMITY (H): ICD-10-CM

## 2023-07-10 DIAGNOSIS — E11.52 TYPE 2 DIABETES MELLITUS WITH DIABETIC PERIPHERAL ANGIOPATHY AND GANGRENE, WITH LONG-TERM CURRENT USE OF INSULIN (H): ICD-10-CM

## 2023-07-10 DIAGNOSIS — Z79.4 TYPE 2 DIABETES MELLITUS WITH DIABETIC PERIPHERAL ANGIOPATHY AND GANGRENE, WITH LONG-TERM CURRENT USE OF INSULIN (H): ICD-10-CM

## 2023-07-10 DIAGNOSIS — Z86.718 PERSONAL HISTORY OF DVT (DEEP VEIN THROMBOSIS): ICD-10-CM

## 2023-07-10 DIAGNOSIS — M86.171 ACUTE OSTEOMYELITIS OF RIGHT ANKLE OR FOOT (H): ICD-10-CM

## 2023-07-10 DIAGNOSIS — R74.01 ELEVATED AST (SGOT): ICD-10-CM

## 2023-07-10 DIAGNOSIS — K75.81 LIVER CIRRHOSIS SECONDARY TO NASH (H): ICD-10-CM

## 2023-07-10 DIAGNOSIS — Z00.00 ROUTINE GENERAL MEDICAL EXAMINATION AT A HEALTH CARE FACILITY: Primary | ICD-10-CM

## 2023-07-10 DIAGNOSIS — L98.9 SKIN LESION: ICD-10-CM

## 2023-07-10 DIAGNOSIS — E11.42 TYPE 2 DIABETES MELLITUS WITH PERIPHERAL NEUROPATHY (H): ICD-10-CM

## 2023-07-10 DIAGNOSIS — E78.5 HYPERLIPIDEMIA LDL GOAL <100: ICD-10-CM

## 2023-07-10 DIAGNOSIS — N52.1 ERECTILE DYSFUNCTION DUE TO DISEASES CLASSIFIED ELSEWHERE: ICD-10-CM

## 2023-07-10 DIAGNOSIS — E78.5 HYPERLIPIDEMIA LDL GOAL <70: ICD-10-CM

## 2023-07-10 LAB
AFP SERPL-MCNC: <1.8 NG/ML
ALBUMIN SERPL BCG-MCNC: 4.3 G/DL (ref 3.5–5.2)
ALP SERPL-CCNC: 62 U/L (ref 40–129)
ALT SERPL W P-5'-P-CCNC: 52 U/L (ref 0–70)
ANION GAP SERPL CALCULATED.3IONS-SCNC: 12 MMOL/L (ref 7–15)
AST SERPL W P-5'-P-CCNC: 47 U/L (ref 0–45)
BASOPHILS # BLD AUTO: 0 10E3/UL (ref 0–0.2)
BASOPHILS NFR BLD AUTO: 1 %
BILIRUB DIRECT SERPL-MCNC: <0.2 MG/DL (ref 0–0.3)
BILIRUB SERPL-MCNC: 0.2 MG/DL
BUN SERPL-MCNC: 17.6 MG/DL (ref 8–23)
CALCIUM SERPL-MCNC: 9.8 MG/DL (ref 8.8–10.2)
CHLORIDE SERPL-SCNC: 103 MMOL/L (ref 98–107)
CHOLEST SERPL-MCNC: 191 MG/DL
CREAT SERPL-MCNC: 0.88 MG/DL (ref 0.67–1.17)
CRP SERPL-MCNC: 3.87 MG/L
DEPRECATED HCO3 PLAS-SCNC: 25 MMOL/L (ref 22–29)
EOSINOPHIL # BLD AUTO: 0.2 10E3/UL (ref 0–0.7)
EOSINOPHIL NFR BLD AUTO: 5 %
ERYTHROCYTE [DISTWIDTH] IN BLOOD BY AUTOMATED COUNT: 14.2 % (ref 10–15)
ERYTHROCYTE [SEDIMENTATION RATE] IN BLOOD BY WESTERGREN METHOD: 21 MM/HR (ref 0–20)
FERRITIN SERPL-MCNC: 23 NG/ML (ref 31–409)
GFR SERPL CREATININE-BSD FRML MDRD: >90 ML/MIN/1.73M2
GLUCOSE SERPL-MCNC: 124 MG/DL (ref 70–99)
HBA1C MFR BLD: 6 % (ref 0–5.6)
HCT VFR BLD AUTO: 37.5 % (ref 40–53)
HDLC SERPL-MCNC: 42 MG/DL
HGB BLD-MCNC: 11.7 G/DL (ref 13.3–17.7)
IMM GRANULOCYTES # BLD: 0 10E3/UL
IMM GRANULOCYTES NFR BLD: 0 %
IRON BINDING CAPACITY (ROCHE): 360 UG/DL (ref 240–430)
IRON SATN MFR SERPL: 9 % (ref 15–46)
IRON SERPL-MCNC: 33 UG/DL (ref 61–157)
LDLC SERPL CALC-MCNC: 127 MG/DL
LYMPHOCYTES # BLD AUTO: 1.8 10E3/UL (ref 0.8–5.3)
LYMPHOCYTES NFR BLD AUTO: 40 %
MCH RBC QN AUTO: 27.7 PG (ref 26.5–33)
MCHC RBC AUTO-ENTMCNC: 31.2 G/DL (ref 31.5–36.5)
MCV RBC AUTO: 89 FL (ref 78–100)
MONOCYTES # BLD AUTO: 0.4 10E3/UL (ref 0–1.3)
MONOCYTES NFR BLD AUTO: 8 %
NEUTROPHILS # BLD AUTO: 2.1 10E3/UL (ref 1.6–8.3)
NEUTROPHILS NFR BLD AUTO: 46 %
NONHDLC SERPL-MCNC: 149 MG/DL
PLATELET # BLD AUTO: 152 10E3/UL (ref 150–450)
POTASSIUM SERPL-SCNC: 4.6 MMOL/L (ref 3.4–5.3)
PROT SERPL-MCNC: 7.4 G/DL (ref 6.4–8.3)
RBC # BLD AUTO: 4.23 10E6/UL (ref 4.4–5.9)
SODIUM SERPL-SCNC: 140 MMOL/L (ref 136–145)
TRIGL SERPL-MCNC: 110 MG/DL
WBC # BLD AUTO: 4.5 10E3/UL (ref 4–11)

## 2023-07-10 PROCEDURE — 83036 HEMOGLOBIN GLYCOSYLATED A1C: CPT | Performed by: FAMILY MEDICINE

## 2023-07-10 PROCEDURE — 83540 ASSAY OF IRON: CPT | Performed by: FAMILY MEDICINE

## 2023-07-10 PROCEDURE — 82105 ALPHA-FETOPROTEIN SERUM: CPT | Performed by: FAMILY MEDICINE

## 2023-07-10 PROCEDURE — 86140 C-REACTIVE PROTEIN: CPT | Performed by: FAMILY MEDICINE

## 2023-07-10 PROCEDURE — 85025 COMPLETE CBC W/AUTO DIFF WBC: CPT | Performed by: FAMILY MEDICINE

## 2023-07-10 PROCEDURE — 80061 LIPID PANEL: CPT | Performed by: FAMILY MEDICINE

## 2023-07-10 PROCEDURE — 83550 IRON BINDING TEST: CPT | Performed by: FAMILY MEDICINE

## 2023-07-10 PROCEDURE — 36415 COLL VENOUS BLD VENIPUNCTURE: CPT | Performed by: FAMILY MEDICINE

## 2023-07-10 PROCEDURE — 99396 PREV VISIT EST AGE 40-64: CPT | Performed by: FAMILY MEDICINE

## 2023-07-10 PROCEDURE — 82728 ASSAY OF FERRITIN: CPT | Performed by: FAMILY MEDICINE

## 2023-07-10 PROCEDURE — 82248 BILIRUBIN DIRECT: CPT | Performed by: FAMILY MEDICINE

## 2023-07-10 PROCEDURE — 85652 RBC SED RATE AUTOMATED: CPT | Performed by: FAMILY MEDICINE

## 2023-07-10 PROCEDURE — 80053 COMPREHEN METABOLIC PANEL: CPT | Performed by: FAMILY MEDICINE

## 2023-07-10 RX ORDER — ATORVASTATIN CALCIUM 40 MG/1
40 TABLET, FILM COATED ORAL DAILY
Qty: 90 TABLET | Refills: 4 | Status: SHIPPED | OUTPATIENT
Start: 2023-07-10 | End: 2024-07-05

## 2023-07-10 RX ORDER — APIXABAN 5 MG/1
5 TABLET, FILM COATED ORAL 2 TIMES DAILY
Qty: 180 TABLET | Refills: 4 | Status: SHIPPED | OUTPATIENT
Start: 2023-07-10 | End: 2024-07-05

## 2023-07-10 RX ORDER — HYDROCHLOROTHIAZIDE 12.5 MG/1
12.5 CAPSULE ORAL EVERY MORNING
Qty: 90 CAPSULE | Refills: 4 | Status: SHIPPED | OUTPATIENT
Start: 2023-07-10 | End: 2024-01-31

## 2023-07-10 RX ORDER — LISINOPRIL 40 MG/1
40 TABLET ORAL DAILY
Qty: 90 TABLET | Refills: 4 | Status: SHIPPED | OUTPATIENT
Start: 2023-07-10 | End: 2024-07-05

## 2023-07-10 RX ORDER — TADALAFIL 5 MG/1
TABLET ORAL
Qty: 30 TABLET | Refills: 1 | Status: SHIPPED | OUTPATIENT
Start: 2023-07-10 | End: 2024-01-31

## 2023-07-10 RX ORDER — KETOCONAZOLE 20 MG/ML
SHAMPOO TOPICAL DAILY PRN
Qty: 120 ML | Refills: 1 | Status: SHIPPED | OUTPATIENT
Start: 2023-07-10 | End: 2024-01-31

## 2023-07-10 RX ORDER — INSULIN ASPART 100 [IU]/ML
INJECTION, SOLUTION INTRAVENOUS; SUBCUTANEOUS
Qty: 30 ML | Refills: 0 | Status: SHIPPED | OUTPATIENT
Start: 2023-07-10 | End: 2023-08-03

## 2023-07-10 RX ORDER — INSULIN ASPART 100 [IU]/ML
INJECTION, SOLUTION INTRAVENOUS; SUBCUTANEOUS
Qty: 30 ML | Refills: 0 | OUTPATIENT
Start: 2023-07-10

## 2023-07-10 ASSESSMENT — ENCOUNTER SYMPTOMS
CONSTIPATION: 0
EYE PAIN: 0
FREQUENCY: 0
HEARTBURN: 0
HEADACHES: 0
SHORTNESS OF BREATH: 0
ABDOMINAL PAIN: 0
JOINT SWELLING: 0
NERVOUS/ANXIOUS: 0
PARESTHESIAS: 0
HEMATOCHEZIA: 0
CHILLS: 0
HEMATURIA: 0
NAUSEA: 0
MYALGIAS: 1
COUGH: 0
WEAKNESS: 0
SORE THROAT: 0
DIARRHEA: 1
ARTHRALGIAS: 0
DIZZINESS: 0
FEVER: 0
DYSURIA: 0
PALPITATIONS: 0

## 2023-07-10 NOTE — PROGRESS NOTES
SUBJECTIVE:   CC: Judie is an 63 year old who presents for preventative health visit.        No data to display              Healthy Habits:     Getting at least 3 servings of Calcium per day:  NO    Bi-annual eye exam:  Yes    Dental care twice a year:  Yes    Sleep apnea or symptoms of sleep apnea:  Daytime drowsiness    Diet:  Diabetic and Carbohydrate counting    Frequency of exercise:  None    Taking medications regularly:  Yes    Medication side effects:  Lightheadedness    Additional concerns today:  Yes          Check a skin tag on left side of face      Social History     Tobacco Use     Smoking status: Never     Smokeless tobacco: Never   Substance Use Topics     Alcohol use: Yes     Comment: rare---red wine 3x per month             7/10/2023     7:20 AM   Alcohol Use   Prescreen: >3 drinks/day or >7 drinks/week? No          No data to display                Last PSA:   PSA   Date Value Ref Range Status   12/05/2016 0.31 0 - 4 ug/L Final     Comment:     Assay Method:  Chemiluminescence using Siemens Vista analyzer     Prostate Specific Antigen Screen   Date Value Ref Range Status   01/03/2022 0.44 0.00 - 4.00 ug/L Final       Reviewed orders with patient. Reviewed health maintenance and updated orders accordingly - Yes      Reviewed and updated as needed this visit by clinical staff   Tobacco  Allergies  Meds     [facility] Hx          Reviewed and updated as needed this visit by Provider         [facility]             Review of Systems   Constitutional: Negative for chills and fever.   HENT: Negative for congestion, ear pain, hearing loss and sore throat.    Eyes: Negative for pain and visual disturbance.   Respiratory: Negative for cough and shortness of breath.    Cardiovascular: Negative for chest pain, palpitations and peripheral edema.   Gastrointestinal: Positive for diarrhea. Negative for abdominal pain, constipation, heartburn, hematochezia and nausea.   Genitourinary: Positive for impotence. Negative  "for dysuria, frequency, genital sores, hematuria, penile discharge and urgency.   Musculoskeletal: Negative for arthralgias and joint swelling.   Skin: Positive for rash.   Neurological: Negative for dizziness, weakness, headaches and paresthesias.   Psychiatric/Behavioral: Negative for mood changes. The patient is not nervous/anxious.      Had diarrhea about a week ago, which he feels is related to food poisoning. He is doing better now.     He has had a non healing lesion on left upper cheek. See picture. Will do referral to dermatology.     OBJECTIVE:   BP (!) 154/84   Pulse 50   Temp 97.3  F (36.3  C) (Tympanic)   Resp 18   Ht 1.803 m (5' 11\")   Wt 101.6 kg (224 lb)   SpO2 96%   BMI 31.24 kg/m      Physical Exam  General: Vital signs reviewed.  Patient is in no acute appearing distress.  Breathing appears nonlabored.  Patient is alert and oriented ×3.      ENT: Ear exam shows bilateral tympanic membranes to be clear without injection, nasal turbinates show no injection or edema, no pharyngeal injection or exudate.    Neck: supple with no adenoapthy, palpable abnormal masses, or thyroid abnormality.    Eyes: No scleral, lid, or periorbital injection or edema noted.  No eye mattering noted.  Corneas are clear. Pupils are equal round and reactive to light with normal consensual eye movement.    Heart: Heart rate is regular without murmur.    Lungs: Lungs are clear to auscultation with good airflow bilaterally.    Abdomen:  Abdomen is soft, nontender.  No palpable abnormal masses or organomegaly.  Bowel sounds are normal.    Genital exam: Patient declined exam for possible hernia.    Back: No areas of tenderness.    Skin: Feet with chronic wounds being addressed by podiatry not examined this visit, with no new concerns per patient.  Otherwise, skin is warm and dry, with no rash noted.  See picture in this visit note for skin lesion on face discussed under review of systems.    Extremities: No lower leg edema " noted.  No large joint edema or restricted range of motion noted.    Neuro: No acute focal deficits or other abnormalities noted.    Psych: Patient is very pleasant, making good eye contact, with clear and fluent speech.  Answers questions appropriately. No psychomotor agitation.             Diagnostic Test Results:  Labs reviewed in Epic    ASSESSMENT/PLAN:   Crispin was seen today for physical.    Diagnoses and all orders for this visit:    Routine general medical examination at a health care facility    Diabetic ulcer of lower extremity (H)    Skin lesion  -     Adult Dermatology Referral; Future    Elevated AST (SGOT)  -     Hepatic function panel; Future  -     Cancel: Hepatic function panel    Hyperlipidemia LDL goal <100  -     Cancel: Lipid panel reflex to direct LDL Non-fasting; Future  -     Lipid Profile    On statin therapy  -     Hepatic function panel; Future  -     Cancel: Hepatic function panel    Benign essential hypertension  -     lisinopril (ZESTRIL) 40 MG tablet; Take 1 tablet (40 mg) by mouth daily  -     hydrochlorothiazide (MICROZIDE) 12.5 MG capsule; Take 1 capsule (12.5 mg) by mouth every morning    Tinea capitis due to trichophyton  -     ketoconazole (NIZORAL) 2 % external shampoo; Apply topically daily as needed for itching or irritation See MD after 6 weeks    Hyperlipidemia LDL goal <70  -     atorvastatin (LIPITOR) 40 MG tablet; Take 1 tablet (40 mg) by mouth daily Take one tablet daily SEE PROVIDER FOR NEXT REFILL    Personal history of DVT (deep vein thrombosis)  -     ELIQUIS ANTICOAGULANT 5 MG tablet; Take 1 tablet (5 mg) by mouth 2 times daily    Erectile dysfunction due to diseases classified elsewhere  -     tadalafil (CIALIS) 5 MG tablet; TAKE 1 TABLET BY MOUTH EVERY DAY AS NEEDED one hour before intercourse    Type 2 diabetes mellitus with peripheral neuropathy (H)  -     Hemoglobin A1c  -     Basic metabolic panel  -     Cancel: Basic metabolic panel  (Ca, Cl, CO2, Creat,  "Gluc, K, Na, BUN)  -     Cancel: Hemoglobin A1c    Liver cirrhosis secondary to SNYDER (H)  -     AFP tumor marker  -     Hepatic function panel    Acute osteomyelitis of right ankle or foot (H)  -     CRP inflammation  -     Erythrocyte sedimentation rate auto  -     CBC with Platelets & Differential    Low hemoglobin  -     Iron & Iron Binding Capacity  -     Ferritin    Iron deficiency anemia, unspecified iron deficiency anemia type              COUNSELING:   Reviewed preventive health counseling, as reflected in patient instructions      BMI:   Estimated body mass index is 31.24 kg/m  as calculated from the following:    Height as of this encounter: 1.803 m (5' 11\").    Weight as of this encounter: 101.6 kg (224 lb).         He reports that he has never smoked. He has never used smokeless tobacco.            Johann Serna, DO  [facility]  "

## 2023-07-10 NOTE — TELEPHONE ENCOUNTER
"Requested Prescriptions   Pending Prescriptions Disp Refills     Insulin Aspart FlexPen 100 UNIT/ML SOPN [Pharmacy Med Name: INSULIN ASPART FLEXPEN INJ, 3ML] 30 mL 0     Sig: ADMINISTER UP TO 70 UNITS UNDER THE SKIN EVERY DAY WITH MEALS       Short Acting Insulin Protocol Failed - 7/9/2023  4:35 PM        Failed - HgbA1C in past 3 or 6 months     If HgbA1C is 8 or greater, it needs to be on file within the past 3 months.  If less than 8, must be on file within the past 6 months.     Recent Labs   Lab Test 07/10/23  0814   A1C 6.0*             Passed - Serum creatinine on file in past 12 months     Recent Labs   Lab Test 03/10/23  1304 01/27/16  1518 01/25/16  0704   CR 0.86   < >  --    CREAT  --   --  0.8    < > = values in this interval not displayed.       Ok to refill medication if creatinine is low          Passed - Medication is active on med list        Passed - Patient is age 18 or older        Passed - Recent (6 mo) or future (30 days) visit within the authorizing provider's specialty     Patient had office visit in the last 6 months or has a visit in the next 30 days with authorizing provider or within the authorizing provider's specialty.  See \"Patient Info\" tab in inbasket, or \"Choose Columns\" in Meds & Orders section of the refill encounter.                 "

## 2023-07-10 NOTE — PATIENT INSTRUCTIONS
I will review your studies via [employer] when they are available. If you have any questions or concerns please let me know via [employer], or call the clinic at  [phone]. See link below for Advance Care Planning, which is good to have on file in medical record. A copy can be scanned in to your medical record.     [email or web address]    Preventive Health Recommendations  Male Ages 50 - 64    Yearly exam:             See your health care provider every year in order to  o   Review health changes.   o   Discuss preventive care.    o   Review your medicines if your doctor has prescribed any.   Have a cholesterol test every 5 years, or more frequently if you are at risk for high cholesterol/heart disease.   Have a diabetes test (fasting glucose) every three years. If you are at risk for diabetes, you should have this test more often.   Have a colonoscopy at age 50, or have a yearly FIT test (stool test). These exams will check for colon cancer.    Talk with your health care provider about whether or not a prostate cancer screening test (PSA) is right for you.  You should be tested each year for STDs (sexually transmitted diseases), if you re at risk.     Shots: Get a flu shot each year. Get a tetanus shot every 10 years.     Nutrition:  Eat at least 5 servings of fruits and vegetables daily.   Eat whole-grain bread, whole-wheat pasta and brown rice instead of white grains and rice.   Get adequate Calcium and Vitamin D.     Lifestyle  Exercise for at least 150 minutes a week (30 minutes a day, 5 days a week). This will help you control your weight and prevent disease.   Limit alcohol to one drink per day.   No smoking.   Wear sunscreen to prevent skin cancer.   See your dentist every six months for an exam and cleaning.   See your eye doctor every 1 to 2 years.

## 2023-07-12 ENCOUNTER — TRANSFERRED RECORDS (OUTPATIENT)
Dept: SURGERY | Facility: CLINIC | Age: 63
End: 2023-07-12
Payer: COMMERCIAL

## 2023-07-12 ENCOUNTER — TRANSFERRED RECORDS (OUTPATIENT)
Dept: HEALTH INFORMATION MANAGEMENT | Facility: CLINIC | Age: 63
End: 2023-07-12
Payer: COMMERCIAL

## 2023-07-30 ENCOUNTER — MYC MEDICAL ADVICE (OUTPATIENT)
Dept: FAMILY MEDICINE | Facility: CLINIC | Age: 63
End: 2023-07-30
Payer: COMMERCIAL

## 2023-08-02 ENCOUNTER — TELEPHONE (OUTPATIENT)
Dept: WOUND CARE | Facility: CLINIC | Age: 63
End: 2023-08-02

## 2023-08-02 ENCOUNTER — OFFICE VISIT (OUTPATIENT)
Dept: PODIATRY | Facility: CLINIC | Age: 63
End: 2023-08-02
Payer: COMMERCIAL

## 2023-08-02 VITALS — DIASTOLIC BLOOD PRESSURE: 70 MMHG | WEIGHT: 224 LBS | SYSTOLIC BLOOD PRESSURE: 142 MMHG | BODY MASS INDEX: 31.24 KG/M2

## 2023-08-02 DIAGNOSIS — E11.621 TYPE 2 DIABETES MELLITUS WITH FOOT ULCER, WITH LONG-TERM CURRENT USE OF INSULIN (H): ICD-10-CM

## 2023-08-02 DIAGNOSIS — E11.621 DIABETIC ULCER OF TOE OF LEFT FOOT ASSOCIATED WITH TYPE 2 DIABETES MELLITUS, WITH FAT LAYER EXPOSED (H): ICD-10-CM

## 2023-08-02 DIAGNOSIS — L97.509 TYPE 2 DIABETES MELLITUS WITH FOOT ULCER, WITH LONG-TERM CURRENT USE OF INSULIN (H): ICD-10-CM

## 2023-08-02 DIAGNOSIS — S98.111A AMPUTATION OF RIGHT GREAT TOE (H): Primary | ICD-10-CM

## 2023-08-02 DIAGNOSIS — Z79.4 TYPE 2 DIABETES MELLITUS WITH FOOT ULCER, WITH LONG-TERM CURRENT USE OF INSULIN (H): ICD-10-CM

## 2023-08-02 DIAGNOSIS — L97.512 RIGHT FOOT ULCER, WITH FAT LAYER EXPOSED (H): ICD-10-CM

## 2023-08-02 DIAGNOSIS — L97.522 DIABETIC ULCER OF TOE OF LEFT FOOT ASSOCIATED WITH TYPE 2 DIABETES MELLITUS, WITH FAT LAYER EXPOSED (H): ICD-10-CM

## 2023-08-02 PROCEDURE — 11042 DBRDMT SUBQ TIS 1ST 20SQCM/<: CPT | Performed by: PODIATRIST

## 2023-08-02 PROCEDURE — 99214 OFFICE O/P EST MOD 30 MIN: CPT | Mod: 25 | Performed by: PODIATRIST

## 2023-08-02 NOTE — LETTER
"    8/2/2023         RE: Crispin Rojas  [address] [facility] [address]        Dear Colleague,    Thank you for referring your patient, Crispin Rojas, to the [facility]. Please see a copy of my visit note below.    [facility]  CLINIC NOTE    CHIEF COMPLAINT:  right foot    PATIENT HISTORY:  Crispin Rojas is a 62 year old male who follows up for right foot. Had third metatarsal head excision and fourth metatarsal osteotomy on 1/19/23. He's been on IV abx per ID due to possible residual osteomyelitis that was pseudomonas resistant to all oral options. He's now being transition to oral abx. Since last appt, he states he feels better overall, being off abx. States very little drainage to right foot. Denies pain. Denies N/F/V/C/D.  -At last appt, sutures applied to right foot. States they're intact and site has been totally dry. States left side was worsening since last appt, but it's not improved. States is still limiting walking and applying majority of weight to left side. Denies N/F/V/C/D. Had recent follow up with ID  -Since last appt, has been back to work and on his feet quite a bit.  has been working 10+ hours a day. States some drainage from both feet, but not significant.  has been wearing inserts in shoes. Some drainage from right and left foot. Nontender. States \"feels great\" overall.     -Patient follows up for b/l feet.  has been doing well and has no complaints. States has been walking significantly and golfing daily. States some drainage from both feet, right more than left. States antibiotics have been lowered to one Augmentin daily.     8/2: Follows up today for b/l feet. Hasn't been seen since the end of June.  has been walking and golfing every day. States he thinks left foot has gotten worse. Did have orthotics modified, but notices they are now causing rubbing on his right foot digit. States had some " left ankle swelling. Denies N/F/V/C/D     Review of Systems:  A 10 point review of systems was performed and is positive for that noted above in the patient history.  All other areas are negative.     PAST MEDICAL HISTORY:   Past Medical History:   Diagnosis Date     Ascending aorta dilatation (H)      Cerebral infarction (H)     2010     Gastroesophageal reflux disease with esophagitis      H/O blood clots 2022     Hyperlipidemia LDL goal <70      Hypertension      Nonalcoholic steatohepatitis 11/30/2022     Numbness and tingling      Obesity      GILA (obstructive sleep apnea) 10/22/2018    CPAP intolerant     PVD (peripheral vascular disease) (H)     s/p left partial toe amputation     Renal stone      Tremor      Type 2 diabetes mellitus with diabetic polyneuropathy, with long-term current use of insulin (H)         PAST SURGICAL HISTORY:   Past Surgical History:   Procedure Laterality Date     AMPUTATE FOOT Left 8/18/2016    Procedure: AMPUTATE FOOT;  Surgeon: Jack Younger DPM;  Location: [facility]     AMPUTATE TOE(S) Right 2/1/2016    Procedure: AMPUTATE TOE(S);  Surgeon: Rachelle Manriquez DPM, Pod;  Location: [facility]     AMPUTATE TOE(S) Right 8/2/2019    Procedure: Right fourth toe partial amputation for treatment of osteomyelitis.;  Surgeon: Jack Younger DPM;  Location: [facility]     AMPUTATE TOE(S) Left 11/8/2019    Procedure: Left second toe amputation at the metatarsophalangeal joint.;  Surgeon: Rachelle Manriquez DPM, Podiatry/Foot and Ankle Surgery;  Location: [facility]     AMPUTATE TOE(S) Right 6/5/2022    Procedure: AMPUTATION OF RIGHT GREAT TOE;  Surgeon: Wili Quiles DPM;  Location: [facility]     AMPUTATE TOE(S) Right 7/28/2022    Procedure: Right foot partial first metatarsal resection;  Surgeon: Tiny Soto DPM;  Location: [facility]     ANGIOGRAM       BIOPSY BONE FOOT Right 7/24/2022    Procedure: Bone biopsy, right first metatarsal.;  Surgeon: Valentino Molina DPM;  Location: [facility]      COLONOSCOPY       COMBINED CYSTOSCOPY, RETROGRADES, URETEROSCOPY, INSERT STENT Left 8/21/2016    Procedure: COMBINED CYSTOSCOPY, RETROGRADES, URETEROSCOPY, INSERT STENT;  Surgeon: Artemio Valenzuela MD;  Location: [facility]     COMBINED CYSTOSCOPY, RETROGRADES, URETEROSCOPY, LASER HOLMIUM LITHOTRIPSY URETER(S), INSERT STENT Left 10/3/2016    Procedure: COMBINED CYSTOSCOPY, RETROGRADES, URETEROSCOPY, LASER HOLMIUM LITHOTRIPSY URETER(S), INSERT STENT;  Surgeon: Artemio Valenzuela MD;  Location: [facility]     EXTRACORPOREAL SHOCK WAVE LITHOTRIPSY (ESWL) Left 9/8/2016    Procedure: EXTRACORPOREAL SHOCK WAVE LITHOTRIPSY (ESWL);  Surgeon: Artemio Valenzuela MD;  Location: [facility]     INCISION AND DRAINAGE FOOT, COMBINED Right 7/24/2022    Procedure: Incision and drainage, right foot ;  Surgeon: Valentino Molina DPM;  Location: [facility]     OSTEOTOMY FOOT Right 11/30/2022    Procedure: 1. Right second metatarsal floating osteotomy  2. Right second digit proximal phalanx arthroplasty 3. Right percutaneous flexor tenotomy;  Surgeon: Tiny Soto DPM;  Location: [facility]     OSTEOTOMY FOOT Right 1/19/2023    Procedure: Right foot third metatarsal head excision, ulcer debridement, matatarsal osteotomies;  Surgeon: Tiny Soto DPM;  Location: [facility]     RECESSION GASTROCNEMIUS Right 2/1/2016    Procedure: RECESSION GASTROCNEMIUS;  Surgeon: Rachelle Manriquez DPM, Pod;  Location: [facility]        MEDICATIONS:  Reviewed in Epic.     ALLERGIES:    Allergies   Allergen Reactions     Statins Swelling     Other reaction(s): Other (see comments)  SIMCOR caused edema in extremities       Bactrim [Sulfamethoxazole-Trimethoprim] Nausea and Vomiting     Sulfa Antibiotics Nausea     Niacin Swelling     SIMCOR caused edema in extremities     Simvastatin Swelling     SIMCOR caused edema in extremities        SOCIAL HISTORY:   Social History     Socioeconomic History     Marital status:      Spouse name: Sydney     Fuentes osborn  children: 2     Years of education: Not on file     Highest education level: Master's degree (e.g., MA, MS, [place], MEd, MSW, ELIANA)   Occupational History     Occupation: marketing     Employer: [employer]     Comment: [employer]   Tobacco Use     Smoking status: Never     Smokeless tobacco: Never   Vaping Use     Vaping Use: Never used   Substance and Sexual Activity     Alcohol use: Yes     Comment: rare---red wine 3x per month     Drug use: Never     Sexual activity: Not Currently     Partners: Female   Other Topics Concern     Parent/sibling w/ CABG, MI or angioplasty before 65F 55M? No   Social History Narrative     Not on file     Social Determinants of Health     Financial Resource Strain: Low Risk  (1/9/2023)    Overall Financial Resource Strain (CARDIA)      Difficulty of Paying Living Expenses: Not very hard   Food Insecurity: No Food Insecurity (1/9/2023)    Hunger Vital Sign      Worried About Running Out of Food in the Last Year: Never true      Ran Out of Food in the Last Year: Never true   Transportation Needs: No Transportation Needs (1/9/2023)    PRAPARE - Transportation      Lack of Transportation (Medical): No      Lack of Transportation (Non-Medical): No   Physical Activity: Sufficiently Active (1/9/2023)    Exercise Vital Sign      Days of Exercise per Week: 5 days      Minutes of Exercise per Session: 50 min   Stress: No Stress Concern Present (1/9/2023)    [place] [employer] - Occupational Stress Questionnaire      Feeling of Stress : Not at all   Social Connections: Moderately Integrated (1/9/2023)    Social Connection and Isolation Panel [NHANES]      Frequency of Communication with Friends and Family: Twice a week      Frequency of Social Gatherings with Friends and Family: Never      Attends Mosque Services: More than 4 times per year      Active Member of Clubs or Organizations: Yes      Attends Club or Organization Meetings: Not on file      Marital Status:     Intimate Partner Violence: Not on file   Housing Stability: Low Risk  (1/9/2023)    Housing Stability Vital Sign      Unable to Pay for Housing in the Last Year: No      Number of Places Lived in the Last Year: 2      Unstable Housing in the Last Year: No        FAMILY HISTORY:   Family History   Problem Relation Age of Onset     Arthritis Mother         SLE     Connective Tissue Disorder Mother         lupus     Diabetes Mother      Cerebrovascular Disease Mother      Cancer Mother      Diabetes Father      Coronary Artery Disease Father      Chronic Obstructive Pulmonary Disease Father      Diabetes Sister      Diabetes Maternal Grandfather         EXAM:Vitals: There were no vitals taken for this visit.  BMI= There is no height or weight on file to calculate BMI.      General appearance: Patient is alert and fully cooperative with history & exam.  No sign of distress is noted during the visit.      Respiratory: Breathing is regular & unlabored while sitting.      HEENT: Hearing is intact to spoken word.  Speech is clear.  No gross evidence of visual impairment that would impact ambulation.      Dermatologic:  Right foot: submet three ulcer --> site smaller and improved. Drier. Measures approx 1 cm x .5 cm x .2 cm. Granular wound bed. No probe to bone. Depth to subcutaneous tissue. No cellulitis or significant drainage  Left foot submet 3 fissure larger today, extends to the submet 4 site. Is also macerated.  More macerated to wound edges Granular wound bed. As below. Measures 3 cm x  3.5 cm x .2 cm      Vascular: Dorsalis pedis and posterior tibial pulses are intact & regular bilaterally.  CFT and skin temperature is normal to both lower extremities.       Neurologic: Lower extremity sensation is diminished, bilateral foot, to light touch.  No evidence of neurological-based weakness or contracture in the lower extremities.       Musculoskeletal: Patient is ambulatory without an assistive device or brace.  S/p  right first ray resection. Right 2nd digit --> now more rectus.   S/p left hallux and second digit amputations     Psychiatric: Affect is pleasant & appropriate.      Cultures:     Foot, Right; Tissue   0 Result Notes  Culture 2+ Pseudomonas aeruginosa Abnormal        Isolated in broth only Enterococcus faecalis Abnormal     On day 1 of incubation   1+ Normal josé            Resulting Agency: [facility]     Susceptibility     Pseudomonas aeruginosa Enterococcus faecalis     JEFFREY JEFFREY     Amikacin <=2 ug/mL Susceptible       Ampicillin   <=2 ug/mL Susceptible     Cefepime <=1 ug/mL Susceptible       Ceftazidime <=1 ug/mL Susceptible       Ciprofloxacin 2 ug/mL Resistant       Gentamicin <=1 ug/mL Susceptible       Gentamicin Synergy   Susceptible... Susceptible 1     Levofloxacin >=8 ug/mL Resistant       Meropenem 1 ug/mL Susceptible       Penicillin   2 ug/mL Susceptible     Piperacillin/Tazobactam <=4 ug/mL Susceptible       Tobramycin <=1 ug/mL Susceptible       Vancomycin   1 ug/mL Susceptible                 Foot, Right; Tissue   0 Result Notes  Culture 4+ Bacteroides fragilis Abnormal     Susceptibilities not routinely done   2+ Anaerococcus species Abnormal     Susceptibilities not routinely done        Pathology:     Final Diagnosis   Right third toe, metatarsal head, amputation-  -Bone with changes consistent with extensive acute osteomyelitis.                 PROCEDURE:   Verbal consent was obtained for debridement. Done on left foot today. A 15 blade was used to excise the nonivable tissue to the ulcer, down to and including subcutaneous tissue. No noted purulence afterwards.  Measurements are  3 cm x  3.5 cm x .2 cm.  No probe to bone. Well tolerated. Site was cleansed with alcohol.      ASSESSMENT:  1. S/p right foot third head resection, fourth metatarsal floating osteotomy, ulcer debridement  --> being treated for residual osteomyelitis, 1/19/23    -Site now open --> slightly smaller today   2. Left foot  submet three preulcerative lesion   -Worsening   3. Right foot dorsal digital preulcerative lesions      MEDICAL DECISION MAKING:   -Patient seen for b/l feet. Left foot worse today and significantly larger. Discussed with him this is due to signifincat walking and activity he's been doing with golf and work.   -Recommended change from regular shoe to CAM boot. Patient agrees that he needs to cut back on activity. States he plans to stop working and golfing for the next 3-4 weeks.   -Also discussed wound care referral to assist in ordering wound care supplies and possible TCC for left foot. He now agrees to this. Referral sent   -Discussed he's at high risk for infection and need for surgery/hospitalization without more offloading to left foot. He agrees to this   -Follow up in 3 weeks here. Eventual follow up at the wound care center  -Debrided left foot as above     Tiny Soto DPM   [place] Department of Podiatry/Foot & Ankle Surgery    High complexity patient   Greater than 30 minutes were spent today, reviewing previous hospital records, counseling and educating the patient on the ongoing treatment plan and coordinating care.                 Again, thank you for allowing me to participate in the care of your patient.        Sincerely,        Tiny Soto DPM

## 2023-08-02 NOTE — TELEPHONE ENCOUNTER
Consult received via [employer] from     Tiny Soto DPM in [facility]    for wound of the foot.    Please schedule with Dr. Soto, Dr. Bernal, Janette Rosenthal, or Colette Fisher at [facility] [place] for next available appointment.    **For all providers, Janette Burnett PA-C, Dr. Soto, Colette Fisher NP or Dr. Carrillo, please schedule a follow up 2-3 weeks after initial appointment.**    Is the patient able to make their own medical decisions? yes    Is patient a LEESA lift? PLEASE INQUIRE WHEN MAKING THE APPOINTMENT AND PUT IN APPOINTMENT NOTES    Routing to  Wound Healing Scheduling.

## 2023-08-02 NOTE — PROGRESS NOTES
"[facility]  CLINIC NOTE    CHIEF COMPLAINT:  right foot    PATIENT HISTORY:  Crispin Rojas is a 62 year old male who follows up for right foot. Had third metatarsal head excision and fourth metatarsal osteotomy on 1/19/23. He's been on IV abx per ID due to possible residual osteomyelitis that was pseudomonas resistant to all oral options. He's now being transition to oral abx. Since last appt, he states he feels better overall, being off abx. States very little drainage to right foot. Denies pain. Denies N/F/V/C/D.  -At last appt, sutures applied to right foot. States they're intact and site has been totally dry. States left side was worsening since last appt, but it's not improved. States is still limiting walking and applying majority of weight to left side. Denies N/F/V/C/D. Had recent follow up with ID  -Since last appt, has been back to work and on his feet quite a bit.  has been working 10+ hours a day. States some drainage from both feet, but not significant. States has been wearing inserts in shoes. Some drainage from right and left foot. Nontender. States \"feels great\" overall.     -Patient follows up for b/l feet.  has been doing well and has no complaints. States has been walking significantly and golfing daily. States some drainage from both feet, right more than left. States antibiotics have been lowered to one Augmentin daily.     8/2: Follows up today for b/l feet. Hasn't been seen since the end of June.  has been walking and golfing every day. States he thinks left foot has gotten worse. Did have orthotics modified, but notices they are now causing rubbing on his right foot digit. States had some left ankle swelling. Denies N/F/V/C/D     Review of Systems:  A 10 point review of systems was performed and is positive for that noted above in the patient history.  All other areas are negative.     PAST MEDICAL HISTORY:   Past Medical History:   Diagnosis " Date    Ascending aorta dilatation (H)     Cerebral infarction (H)     2010    Gastroesophageal reflux disease with esophagitis     H/O blood clots 2022    Hyperlipidemia LDL goal <70     Hypertension     Nonalcoholic steatohepatitis 11/30/2022    Numbness and tingling     Obesity     GILA (obstructive sleep apnea) 10/22/2018    CPAP intolerant    PVD (peripheral vascular disease) (H)     s/p left partial toe amputation    Renal stone     Tremor     Type 2 diabetes mellitus with diabetic polyneuropathy, with long-term current use of insulin (H)         PAST SURGICAL HISTORY:   Past Surgical History:   Procedure Laterality Date    AMPUTATE FOOT Left 8/18/2016    Procedure: AMPUTATE FOOT;  Surgeon: Jack Younger DPM;  Location: [facility]    AMPUTATE TOE(S) Right 2/1/2016    Procedure: AMPUTATE TOE(S);  Surgeon: Rachelle Manriquez DPM, Pod;  Location: [facility]    AMPUTATE TOE(S) Right 8/2/2019    Procedure: Right fourth toe partial amputation for treatment of osteomyelitis.;  Surgeon: Jack Younger DPM;  Location: [facility]    AMPUTATE TOE(S) Left 11/8/2019    Procedure: Left second toe amputation at the metatarsophalangeal joint.;  Surgeon: Rachelle Manriquez DPM, Podiatry/Foot and Ankle Surgery;  Location: [facility]    AMPUTATE TOE(S) Right 6/5/2022    Procedure: AMPUTATION OF RIGHT GREAT TOE;  Surgeon: Wili Quiles DPM;  Location: [facility]    AMPUTATE TOE(S) Right 7/28/2022    Procedure: Right foot partial first metatarsal resection;  Surgeon: Tiny Soto DPM;  Location: [facility]    ANGIOGRAM      BIOPSY BONE FOOT Right 7/24/2022    Procedure: Bone biopsy, right first metatarsal.;  Surgeon: Valentino Molina DPM;  Location: [facility]    COLONOSCOPY      COMBINED CYSTOSCOPY, RETROGRADES, URETEROSCOPY, INSERT STENT Left 8/21/2016    Procedure: COMBINED CYSTOSCOPY, RETROGRADES, URETEROSCOPY, INSERT STENT;  Surgeon: Artemio Valenzuela MD;  Location: [facility]    COMBINED CYSTOSCOPY, RETROGRADES, URETEROSCOPY, LASER  HOLMIUM LITHOTRIPSY URETER(S), INSERT STENT Left 10/3/2016    Procedure: COMBINED CYSTOSCOPY, RETROGRADES, URETEROSCOPY, LASER HOLMIUM LITHOTRIPSY URETER(S), INSERT STENT;  Surgeon: Artemio Valenzuela MD;  Location: [facility]    EXTRACORPOREAL SHOCK WAVE LITHOTRIPSY (ESWL) Left 9/8/2016    Procedure: EXTRACORPOREAL SHOCK WAVE LITHOTRIPSY (ESWL);  Surgeon: Artemio Valenzuela MD;  Location: [facility]    INCISION AND DRAINAGE FOOT, COMBINED Right 7/24/2022    Procedure: Incision and drainage, right foot ;  Surgeon: Valentino Molina DPM;  Location: [facility]    OSTEOTOMY FOOT Right 11/30/2022    Procedure: 1. Right second metatarsal floating osteotomy  2. Right second digit proximal phalanx arthroplasty 3. Right percutaneous flexor tenotomy;  Surgeon: Tiny Soto DPM;  Location: [facility]    OSTEOTOMY FOOT Right 1/19/2023    Procedure: Right foot third metatarsal head excision, ulcer debridement, matatarsal osteotomies;  Surgeon: Tiny Soto DPM;  Location: [facility]    RECESSION GASTROCNEMIUS Right 2/1/2016    Procedure: RECESSION GASTROCNEMIUS;  Surgeon: Rachelle Manriquez DPM, Pod;  Location: [facility]        MEDICATIONS:  Reviewed in Epic.     ALLERGIES:    Allergies   Allergen Reactions    Statins Swelling     Other reaction(s): Other (see comments)  SIMCOR caused edema in extremities      Bactrim [Sulfamethoxazole-Trimethoprim] Nausea and Vomiting    Sulfa Antibiotics Nausea    Niacin Swelling     SIMCOR caused edema in extremities    Simvastatin Swelling     SIMCOR caused edema in extremities        SOCIAL HISTORY:   Social History     Socioeconomic History    Marital status:      Spouse name: Sydney    Number of children: 2    Years of education: Not on file    Highest education level: Master's degree (e.g., MA, MS, [place], MEd, MSW, ELIANA)   Occupational History    Occupation: marketing     Employer: [employer]     Comment: [employer]   Tobacco Use    Smoking status: Never    Smokeless tobacco: Never    Vaping Use    Vaping Use: Never used   Substance and Sexual Activity    Alcohol use: Yes     Comment: rare---red wine 3x per month    Drug use: Never    Sexual activity: Not Currently     Partners: Female   Other Topics Concern    Parent/sibling w/ CABG, MI or angioplasty before 65F 55M? No   Social History Narrative    Not on file     Social Determinants of Health     Financial Resource Strain: Low Risk  (1/9/2023)    Overall Financial Resource Strain (CARDIA)     Difficulty of Paying Living Expenses: Not very hard   Food Insecurity: No Food Insecurity (1/9/2023)    Hunger Vital Sign     Worried About Running Out of Food in the Last Year: Never true     Ran Out of Food in the Last Year: Never true   Transportation Needs: No Transportation Needs (1/9/2023)    PRAPARE - Transportation     Lack of Transportation (Medical): No     Lack of Transportation (Non-Medical): No   Physical Activity: Sufficiently Active (1/9/2023)    Exercise Vital Sign     Days of Exercise per Week: 5 days     Minutes of Exercise per Session: 50 min   Stress: No Stress Concern Present (1/9/2023)    [employer] - Occupational Stress Questionnaire     Feeling of Stress : Not at all   Social Connections: Moderately Integrated (1/9/2023)    Social Connection and Isolation Panel [NHANES]     Frequency of Communication with Friends and Family: Twice a week     Frequency of Social Gatherings with Friends and Family: Never     Attends Druze Services: More than 4 times per year     Active Member of Clubs or Organizations: Yes     Attends Club or Organization Meetings: Not on file     Marital Status:    Intimate Partner Violence: Not on file   Housing Stability: Low Risk  (1/9/2023)    Housing Stability Vital Sign     Unable to Pay for Housing in the Last Year: No     Number of Places Lived in the Last Year: 2     Unstable Housing in the Last Year: No        FAMILY HISTORY:   Family History   Problem Relation  Age of Onset    Arthritis Mother         SLE    Connective Tissue Disorder Mother         lupus    Diabetes Mother     Cerebrovascular Disease Mother     Cancer Mother     Diabetes Father     Coronary Artery Disease Father     Chronic Obstructive Pulmonary Disease Father     Diabetes Sister     Diabetes Maternal Grandfather         EXAM:Vitals: There were no vitals taken for this visit.  BMI= There is no height or weight on file to calculate BMI.      General appearance: Patient is alert and fully cooperative with history & exam.  No sign of distress is noted during the visit.      Respiratory: Breathing is regular & unlabored while sitting.      HEENT: Hearing is intact to spoken word.  Speech is clear.  No gross evidence of visual impairment that would impact ambulation.      Dermatologic:  Right foot: submet three ulcer --> site smaller and improved. Drier. Measures approx 1 cm x .5 cm x .2 cm. Granular wound bed. No probe to bone. Depth to subcutaneous tissue. No cellulitis or significant drainage  Left foot submet 3 fissure larger today, extends to the submet 4 site. Is also macerated.  More macerated to wound edges Granular wound bed. As below. Measures 3 cm x  3.5 cm x .2 cm      Vascular: Dorsalis pedis and posterior tibial pulses are intact & regular bilaterally.  CFT and skin temperature is normal to both lower extremities.       Neurologic: Lower extremity sensation is diminished, bilateral foot, to light touch.  No evidence of neurological-based weakness or contracture in the lower extremities.       Musculoskeletal: Patient is ambulatory without an assistive device or brace.  S/p right first ray resection. Right 2nd digit --> now more rectus.   S/p left hallux and second digit amputations     Psychiatric: Affect is pleasant & appropriate.      Cultures:     Foot, Right; Tissue   0 Result Notes  Culture 2+ Pseudomonas aeruginosa Abnormal        Isolated in broth only Enterococcus faecalis Abnormal     On  day 1 of incubation   1+ Normal josé            Resulting Agency: [facility]     Susceptibility     Pseudomonas aeruginosa Enterococcus faecalis     JEFFREY JEFFREY     Amikacin <=2 ug/mL Susceptible       Ampicillin   <=2 ug/mL Susceptible     Cefepime <=1 ug/mL Susceptible       Ceftazidime <=1 ug/mL Susceptible       Ciprofloxacin 2 ug/mL Resistant       Gentamicin <=1 ug/mL Susceptible       Gentamicin Synergy   Susceptible... Susceptible 1     Levofloxacin >=8 ug/mL Resistant       Meropenem 1 ug/mL Susceptible       Penicillin   2 ug/mL Susceptible     Piperacillin/Tazobactam <=4 ug/mL Susceptible       Tobramycin <=1 ug/mL Susceptible       Vancomycin   1 ug/mL Susceptible                 Foot, Right; Tissue   0 Result Notes  Culture 4+ Bacteroides fragilis Abnormal     Susceptibilities not routinely done   2+ Anaerococcus species Abnormal     Susceptibilities not routinely done        Pathology:     Final Diagnosis   Right third toe, metatarsal head, amputation-  -Bone with changes consistent with extensive acute osteomyelitis.                 PROCEDURE:   Verbal consent was obtained for debridement. Done on left foot today. A 15 blade was used to excise the nonivable tissue to the ulcer, down to and including subcutaneous tissue. No noted purulence afterwards.  Measurements are  3 cm x  3.5 cm x .2 cm.  No probe to bone. Well tolerated. Site was cleansed with alcohol.      ASSESSMENT:  1. S/p right foot third head resection, fourth metatarsal floating osteotomy, ulcer debridement  --> being treated for residual osteomyelitis, 1/19/23    -Site now open --> slightly smaller today   2. Left foot submet three preulcerative lesion   -Worsening   3. Right foot dorsal digital preulcerative lesions      MEDICAL DECISION MAKING:   -Patient seen for b/l feet. Left foot worse today and significantly larger. Discussed with him this is due to signifincat walking and activity he's been doing with golf and work.   -Recommended change  from regular shoe to CAM boot. Patient agrees that he needs to cut back on activity. States he plans to stop working and golfing for the next 3-4 weeks.   -Also discussed wound care referral to assist in ordering wound care supplies and possible TCC for left foot. He now agrees to this. Referral sent   -Discussed he's at high risk for infection and need for surgery/hospitalization without more offloading to left foot. He agrees to this   -Follow up in 3 weeks here. Eventual follow up at the wound care center  -Debrided left foot as above     Tiny Soto DPM   [facility]    High complexity patient   Greater than 30 minutes were spent today, reviewing previous hospital records, counseling and educating the patient on the ongoing treatment plan and coordinating care.

## 2023-08-03 ENCOUNTER — OFFICE VISIT (OUTPATIENT)
Dept: ENDOCRINOLOGY | Facility: CLINIC | Age: 63
End: 2023-08-03
Payer: COMMERCIAL

## 2023-08-03 ENCOUNTER — HOSPITAL ENCOUNTER (OUTPATIENT)
Dept: ULTRASOUND IMAGING | Facility: CLINIC | Age: 63
Discharge: HOME | End: 2023-08-03
Attending: INTERNAL MEDICINE | Admitting: INTERNAL MEDICINE
Payer: COMMERCIAL

## 2023-08-03 VITALS
WEIGHT: 218 LBS | DIASTOLIC BLOOD PRESSURE: 70 MMHG | BODY MASS INDEX: 30.4 KG/M2 | SYSTOLIC BLOOD PRESSURE: 140 MMHG | HEART RATE: 55 BPM | OXYGEN SATURATION: 98 %

## 2023-08-03 DIAGNOSIS — E11.42 TYPE 2 DIABETES MELLITUS WITH PERIPHERAL NEUROPATHY (H): ICD-10-CM

## 2023-08-03 DIAGNOSIS — K74.60 HEPATIC CIRRHOSIS, UNSPECIFIED HEPATIC CIRRHOSIS TYPE (H): ICD-10-CM

## 2023-08-03 PROCEDURE — 76705 ECHO EXAM OF ABDOMEN: CPT

## 2023-08-03 PROCEDURE — 99214 OFFICE O/P EST MOD 30 MIN: CPT | Performed by: NURSE PRACTITIONER

## 2023-08-03 RX ORDER — INSULIN DEGLUDEC 100 U/ML
INJECTION, SOLUTION SUBCUTANEOUS
Qty: 45 ML | Refills: 3 | Status: ON HOLD | OUTPATIENT
Start: 2023-08-03 | End: 2024-06-17

## 2023-08-03 RX ORDER — INSULIN ASPART 100 [IU]/ML
70 INJECTION, SOLUTION INTRAVENOUS; SUBCUTANEOUS DAILY
Qty: 30 ML | Refills: 0 | Status: SHIPPED | OUTPATIENT
Start: 2023-08-03 | End: 2023-10-25

## 2023-08-03 RX ORDER — METFORMIN HCL 500 MG
1000 TABLET, EXTENDED RELEASE 24 HR ORAL 2 TIMES DAILY
Qty: 360 TABLET | Refills: 0 | Status: SHIPPED | OUTPATIENT
Start: 2023-08-03 | End: 2024-01-22

## 2023-08-03 RX ORDER — SEMAGLUTIDE 1.34 MG/ML
INJECTION, SOLUTION SUBCUTANEOUS
Qty: 9 ML | Refills: 3 | Status: SHIPPED | OUTPATIENT
Start: 2023-08-03 | End: 2024-02-16

## 2023-08-03 NOTE — Clinical Note
8/3/2023         RE: Crispin Rojas  [address] [facility] [address]        Dear Colleague,    Thank you for referring your patient, Crispin Rojas, to the [facility]. Please see a copy of my visit note below.    No notes on file    Again, thank you for allowing me to participate in the care of your patient.        Sincerely,        Shira Montanez NP

## 2023-08-03 NOTE — PROGRESS NOTES
"[facility] ENDOCRINOLOGY    Diabetes Note 8/3/2023    Crispin Rojas, 1960, [id number]          Reason for visit      1. Type 2 diabetes mellitus with peripheral neuropathy (H)        HPI     Crispin Rojas is a very pleasant 63 year old old male who presents for follow up.  SUMMARY:    Pat is seen today in follow-up for DM 2. His current A1c is 6.0 and about where he usually lives. He continues to  work with Podiatry for third metatarsal head excision and fourth metatarsal osteotomy on 1/19/23. He's been on IV abx per ID due to possible residual osteomyelitis that was pseudomonas resistant to all oral options. This ulcer has worsened, and he will be getting a cast or walking boot put on that foot and ankle to see if this and movement restrictions will help heal it up.     He is not looking forward to being \"laid up\", as traditionally, this has worsened his management of BG. Data showed TIR 57% and low, 14%. Ave BG of 108. He doesn't feel that his lower numbers are unmanageable.       He continues to take Tresiba, 50-55 units daily. He takes Metformin XR, 500 mg BID. He is also taking Ozempic, 1 mg weekly.     Renal and Hepatic function are within normal range. He is on an ACE and a Statin.       Blood glucose data:      Past Medical History     Patient Active Problem List   Diagnosis    Calculus of kidney    Chronic left shoulder pain    Type 2 diabetes mellitus with peripheral neuropathy (H)    Hyperlipidemia LDL goal <70    Essential hypertension    Right bundle branch block    GILA (obstructive sleep apnea)    Diabetic ulcer of lower extremity (H)    PVD (peripheral vascular disease) (H)    Scoliosis of thoracic spine, unspecified scoliosis type    Other sequelae following unspecified cerebrovascular disease    Cervical pain    Bilateral thoracic back pain    Osteomyelitis of great toe of right foot (H)    Acute deep vein thrombosis (DVT) of tibial vein of right lower extremity (H)    Abnormal CT " of liver    Benign neoplasm of transverse colon    Duodenitis    Hemorrhoids    Polyp of colon    Acute osteomyelitis of right foot (H)    Essential tremor    Nonalcoholic steatohepatitis    Otalgia of left ear    Shoulder pain    Liver cirrhosis secondary to SNYDER (H)    Gastro-esophageal reflux disease with esophagitis    Chronic osteomyelitis of right foot (H)        Family History       family history includes Arthritis in his mother; Cancer in his mother; Cerebrovascular Disease in his mother; Chronic Obstructive Pulmonary Disease in his father; Connective Tissue Disorder in his mother; Coronary Artery Disease in his father; Diabetes in his father, maternal grandfather, mother, and sister.    Social History      reports that he has never smoked. He has never used smokeless tobacco. He reports current alcohol use. He reports that he does not use drugs.      Review of Systems     Patient has no polyuria or polydipsia, no chest pain, dyspnea or TIA's, no numbness, tingling or pain in extremities  Remainder negative except as noted in HPI.    Vital Signs     BP (!) 140/70 (BP Location: Right arm, Patient Position: Sitting, Cuff Size: Adult Large)   Pulse 55   Wt 98.9 kg (218 lb)   SpO2 98%   BMI 30.40 kg/m    Wt Readings from Last 3 Encounters:   08/03/23 98.9 kg (218 lb)   08/02/23 101.6 kg (224 lb)   07/10/23 101.6 kg (224 lb)       Physical Exam     Constitutional:  Well developed, Well nourished  HENT:  Normocephalic,   Neck: normal in appearance  Eyes:  PERRL, Conjunctiva pink  Respiratory:  No respiratory distress  Skin: No acanthosis nigricans, lipoatrophy or lipodystrophy  Neurologic:  Alert & oriented x 3, nonfocal  Psychiatric:  Affect, Mood, Insight appropriate        Assessment     1. Type 2 diabetes mellitus with peripheral neuropathy (H)        Plan     Pat will continue with his current medication regimen. I will see him back in 6 months. Refills provided today.         Shira Montanez NP  [facility]  Endocrinology  8/3/2023  9:33 AM      Lab Results     No results found for: HGBA1C, CREATININE, MICROALBUR    Cholesterol   Date Value Ref Range Status   07/10/2023 191 <200 mg/dL Final   06/22/2021 95 <200 mg/dL Final     HDL Cholesterol   Date Value Ref Range Status   06/22/2021 42 >39 mg/dL Final     Direct Measure HDL   Date Value Ref Range Status   07/10/2023 42 >=40 mg/dL Final     Triglycerides   Date Value Ref Range Status   07/10/2023 110 <150 mg/dL Final   06/22/2021 42 <150 mg/dL Final     Comment:     Fasting specimen       [unfilled]      Current Medications     Outpatient Medications Prior to Visit   Medication Sig Dispense Refill    amLODIPine (NORVASC) 5 MG tablet Take 1 tablet (5 mg) by mouth 2 times daily Take one tablet twice daily  SEE MD THIS QUARTER 200 tablet 5    atorvastatin (LIPITOR) 40 MG tablet Take 1 tablet (40 mg) by mouth daily Take one tablet daily SEE PROVIDER FOR NEXT REFILL 90 tablet 4    blood glucose (NO BRAND SPECIFIED) lancets standard Use to test blood sugar 3 times daily or as directed. 300 each 3    calcium carbonate (OS-NARA) 500 MG tablet Take 1 tablet by mouth 2 times daily      Cholecalciferol (VITAMIN D3) 25 MCG (1000 UT) CAPS Take 1,000 Units by mouth daily       Co-Enzyme Q10 100 MG CAPS Take 100 mg by mouth daily       Continuous Blood Gluc Sensor (FREESTYLE LUCERO 14 DAY SENSOR) MISC USE ONE EVERY 14 DAYS 6 each 3    ELIQUIS ANTICOAGULANT 5 MG tablet Take 1 tablet (5 mg) by mouth 2 times daily 180 tablet 4    gabapentin (NEURONTIN) 100 MG capsule Take 400 mg by mouth 3 times daily      hydrochlorothiazide (MICROZIDE) 12.5 MG capsule Take 1 capsule (12.5 mg) by mouth every morning 90 capsule 4    insulin pen needle (B-D U/F) 31G X 5 MM miscellaneous USE TO INJECT LANTUS TWICE DAILY AND NOVOLOG THREE TIMES DAILY AS DIRECTED 500 each 0    ketoconazole (NIZORAL) 2 % external shampoo Apply topically daily as needed for itching or irritation See MD after 6 weeks 120 mL 1     lisinopril (ZESTRIL) 40 MG tablet Take 1 tablet (40 mg) by mouth daily 90 tablet 4    MAGNESIUM GLYCINATE PLUS PO Take 400 mg by mouth 2 times daily      Multiple Vitamins-Minerals (MULTIVITAMIN PO) Take 1 tablet by mouth daily       Omega-3 Fatty Acids (OMEGA-3 FISH OIL PO) Take 1 g by mouth 2 times daily (with meals)       omeprazole (PRILOSEC) 40 MG DR capsule Take 40 mg by mouth daily      primidone (MYSOLINE) 50 MG tablet Take by mouth every 12 hours      tadalafil (CIALIS) 5 MG tablet TAKE 1 TABLET BY MOUTH EVERY DAY AS NEEDED one hour before intercourse 30 tablet 1    mupirocin (BACTROBAN) 2 % external ointment Apply a small amount to affected nares 2 times a day for one month. (Patient not taking: Reported on 7/10/2023) 30 g 1    Insulin Aspart FlexPen 100 UNIT/ML SOPN ADMINISTER UP TO 70 UNITS UNDER THE SKIN EVERY DAY WITH MEALS 30 mL 0    metFORMIN (GLUCOPHAGE XR) 500 MG 24 hr tablet TAKE 2 TABLETS BY MOUTH TWICE DAILY 360 tablet 0    OZEMPIC, 1 MG/DOSE, 4 MG/3ML pen INJECT 1MG SUBCITANEOUSLY AS DIRECTED EVERY 7 DAYS ON WEDNESDAYS 9 mL 1    TRESIBA FLEXTOUCH 100 UNIT/ML pen ADMINISTER 50 TO 55 UNITS UNDER THE SKIN DAILY 45 mL 0     No facility-administered medications prior to visit.

## 2023-08-04 ENCOUNTER — TRANSFERRED RECORDS (OUTPATIENT)
Dept: HEALTH INFORMATION MANAGEMENT | Facility: CLINIC | Age: 63
End: 2023-08-04
Payer: COMMERCIAL

## 2023-08-07 ENCOUNTER — TELEPHONE (OUTPATIENT)
Dept: FAMILY MEDICINE | Facility: CLINIC | Age: 63
End: 2023-08-07
Payer: COMMERCIAL

## 2023-08-07 NOTE — TELEPHONE ENCOUNTER
Summary:    Patient is due/failing the following:   BP CHECK    Reviewed:    [] CARE EVERYWHERE  [] LAST OV NOTE   [] FYI TAB  [] MYCHART ACTIVE?  [] LAST PANEL ENCOUNTER  [] FUTURE APPTS  [] IMMUNIZATIONS  [] Media Tab            Action needed:   Patient needs nurse only appointment.    Type of outreach:    Phone, spoke to patient.  Chart updated                                                                               Shania Warren/KOBI  [place]---[facility]

## 2023-08-10 ENCOUNTER — TRANSFERRED RECORDS (OUTPATIENT)
Dept: SURGERY | Facility: CLINIC | Age: 63
End: 2023-08-10
Payer: COMMERCIAL

## 2023-08-21 DIAGNOSIS — R04.0 EPISTAXIS: ICD-10-CM

## 2023-08-21 RX ORDER — MUPIROCIN 20 MG/G
OINTMENT TOPICAL
Qty: 30 G | Refills: 1 | Status: ON HOLD | OUTPATIENT
Start: 2023-08-21 | End: 2024-06-17

## 2023-08-23 ENCOUNTER — OFFICE VISIT (OUTPATIENT)
Dept: PODIATRY | Facility: CLINIC | Age: 63
End: 2023-08-23
Payer: COMMERCIAL

## 2023-08-23 ENCOUNTER — TELEPHONE (OUTPATIENT)
Dept: PODIATRY | Facility: CLINIC | Age: 63
End: 2023-08-23

## 2023-08-23 VITALS
DIASTOLIC BLOOD PRESSURE: 81 MMHG | BODY MASS INDEX: 30.52 KG/M2 | WEIGHT: 218 LBS | HEIGHT: 71 IN | SYSTOLIC BLOOD PRESSURE: 157 MMHG

## 2023-08-23 DIAGNOSIS — L97.512 RIGHT FOOT ULCER, WITH FAT LAYER EXPOSED (H): Primary | ICD-10-CM

## 2023-08-23 DIAGNOSIS — L97.521 ULCER OF LEFT FOOT, LIMITED TO BREAKDOWN OF SKIN (H): ICD-10-CM

## 2023-08-23 PROCEDURE — 11042 DBRDMT SUBQ TIS 1ST 20SQCM/<: CPT | Mod: 79 | Performed by: PODIATRIST

## 2023-08-23 PROCEDURE — 99213 OFFICE O/P EST LOW 20 MIN: CPT | Mod: 25 | Performed by: PODIATRIST

## 2023-08-23 NOTE — LETTER
"    8/23/2023         RE: Crispin Rojas  [address] [facility] [address]        Dear Colleague,    Thank you for referring your patient, Crispin Rojas, to the [facility]. Please see a copy of my visit note below.    [facility]  CLINIC NOTE    CHIEF COMPLAINT:  right foot    PATIENT HISTORY:  Crispin Rojas is a 62 year old male who follows up for right foot. Had third metatarsal head excision and fourth metatarsal osteotomy on 1/19/23. He's been on IV abx per ID due to possible residual osteomyelitis that was pseudomonas resistant to all oral options. He's now being transition to oral abx. Since last appt, he states he feels better overall, being off abx. States very little drainage to right foot. Denies pain. Denies N/F/V/C/D.  -At last appt, sutures applied to right foot. States they're intact and site has been totally dry. States left side was worsening since last appt, but it's not improved. States is still limiting walking and applying majority of weight to left side. Denies N/F/V/C/D. Had recent follow up with ID  -Since last appt, has been back to work and on his feet quite a bit.  has been working 10+ hours a day. States some drainage from both feet, but not significant.  has been wearing inserts in shoes. Some drainage from right and left foot. Nontender. States \"feels great\" overall.     -Patient follows up for b/l feet.  has been doing well and has no complaints. States has been walking significantly and golfing daily. States some drainage from both feet, right more than left. States antibiotics have been lowered to one Augmentin daily.     8/2: Follows up today for b/l feet. Hasn't been seen since the end of June.  has been walking and golfing every day. States he thinks left foot has gotten worse. Did have orthotics modified, but notices they are now causing rubbing on his right foot digit. States had some " left ankle swelling. Denies N/F/V/C/D     8/23: Follows up today for b/l feet. States he took the last three weeks off from work/golfing. States noticed large improvement since then. Much less drainage and several of the sites have healed. Denies pain. Denies N/F/V/C/D. Does want to get back to work.     Review of Systems:  A 10 point review of systems was performed and is positive for that noted above in the patient history.  All other areas are negative.     PAST MEDICAL HISTORY:   Past Medical History:   Diagnosis Date     Ascending aorta dilatation (H)      Cerebral infarction (H)     2010     Gastroesophageal reflux disease with esophagitis      H/O blood clots 2022     Hyperlipidemia LDL goal <70      Hypertension      Nonalcoholic steatohepatitis 11/30/2022     Numbness and tingling      Obesity      GILA (obstructive sleep apnea) 10/22/2018    CPAP intolerant     PVD (peripheral vascular disease) (H)     s/p left partial toe amputation     Renal stone      Tremor      Type 2 diabetes mellitus with diabetic polyneuropathy, with long-term current use of insulin (H)         PAST SURGICAL HISTORY:   Past Surgical History:   Procedure Laterality Date     AMPUTATE FOOT Left 8/18/2016    Procedure: AMPUTATE FOOT;  Surgeon: Jack Younger DPM;  Location: [facility]     AMPUTATE TOE(S) Right 2/1/2016    Procedure: AMPUTATE TOE(S);  Surgeon: Rachelle Manriquez DPM, Pod;  Location: [facility]     AMPUTATE TOE(S) Right 8/2/2019    Procedure: Right fourth toe partial amputation for treatment of osteomyelitis.;  Surgeon: Jack Younger DPM;  Location: [facility]     AMPUTATE TOE(S) Left 11/8/2019    Procedure: Left second toe amputation at the metatarsophalangeal joint.;  Surgeon: Rachelle Manriquez DPM, Podiatry/Foot and Ankle Surgery;  Location: [facility]     AMPUTATE TOE(S) Right 6/5/2022    Procedure: AMPUTATION OF RIGHT GREAT TOE;  Surgeon: Wili Quiles DPM;  Location: [facility]     AMPUTATE TOE(S) Right 7/28/2022     Procedure: Right foot partial first metatarsal resection;  Surgeon: Tiny Soto DPM;  Location: [facility]     ANGIOGRAM       BIOPSY BONE FOOT Right 7/24/2022    Procedure: Bone biopsy, right first metatarsal.;  Surgeon: Valentino Molina DPM;  Location: [facility]     COLONOSCOPY       COMBINED CYSTOSCOPY, RETROGRADES, URETEROSCOPY, INSERT STENT Left 8/21/2016    Procedure: COMBINED CYSTOSCOPY, RETROGRADES, URETEROSCOPY, INSERT STENT;  Surgeon: Artemio Valenzuela MD;  Location: [facility]     COMBINED CYSTOSCOPY, RETROGRADES, URETEROSCOPY, LASER HOLMIUM LITHOTRIPSY URETER(S), INSERT STENT Left 10/3/2016    Procedure: COMBINED CYSTOSCOPY, RETROGRADES, URETEROSCOPY, LASER HOLMIUM LITHOTRIPSY URETER(S), INSERT STENT;  Surgeon: Artemio Valenzuela MD;  Location: [facility]     EXTRACORPOREAL SHOCK WAVE LITHOTRIPSY (ESWL) Left 9/8/2016    Procedure: EXTRACORPOREAL SHOCK WAVE LITHOTRIPSY (ESWL);  Surgeon: Artemio Valenzuela MD;  Location: [facility]     INCISION AND DRAINAGE FOOT, COMBINED Right 7/24/2022    Procedure: Incision and drainage, right foot ;  Surgeon: Valentino Molina DPM;  Location: [facility]     OSTEOTOMY FOOT Right 11/30/2022    Procedure: 1. Right second metatarsal floating osteotomy  2. Right second digit proximal phalanx arthroplasty 3. Right percutaneous flexor tenotomy;  Surgeon: Tiny Soto DPM;  Location: [facility]     OSTEOTOMY FOOT Right 1/19/2023    Procedure: Right foot third metatarsal head excision, ulcer debridement, matatarsal osteotomies;  Surgeon: Tiny Soto DPM;  Location: [facility]     RECESSION GASTROCNEMIUS Right 2/1/2016    Procedure: RECESSION GASTROCNEMIUS;  Surgeon: Rachelle Manriquez DPM, Pod;  Location: [facility]        MEDICATIONS:  Reviewed in Epic.     ALLERGIES:    Allergies   Allergen Reactions     Statins Swelling     Other reaction(s): Other (see comments)  SIMCOR caused edema in extremities       Bactrim [Sulfamethoxazole-Trimethoprim] Nausea and Vomiting     Sulfa  Antibiotics Nausea     Niacin Swelling     SIMCOR caused edema in extremities     Simvastatin Swelling     SIMCOR caused edema in extremities        SOCIAL HISTORY:   Social History     Socioeconomic History     Marital status:      Spouse name: Sydney     Number of children: 2     Years of education: Not on file     Highest education level: Master's degree (e.g., MA, MS, [place], MEd, MSW, ELIANA)   Occupational History     Occupation: marketing     Employer: [employer]     Comment: [employer]   Tobacco Use     Smoking status: Never     Smokeless tobacco: Never   Vaping Use     Vaping Use: Never used   Substance and Sexual Activity     Alcohol use: Yes     Comment: rare---red wine 3x per month     Drug use: Never     Sexual activity: Not Currently     Partners: Female   Other Topics Concern     Parent/sibling w/ CABG, MI or angioplasty before 65F 55M? No   Social History Narrative     Not on file     Social Determinants of Health     Financial Resource Strain: Low Risk  (1/9/2023)    Overall Financial Resource Strain (CARDIA)      Difficulty of Paying Living Expenses: Not very hard   Food Insecurity: No Food Insecurity (1/9/2023)    Hunger Vital Sign      Worried About Running Out of Food in the Last Year: Never true      Ran Out of Food in the Last Year: Never true   Transportation Needs: No Transportation Needs (1/9/2023)    PRAPARE - Transportation      Lack of Transportation (Medical): No      Lack of Transportation (Non-Medical): No   Physical Activity: Sufficiently Active (1/9/2023)    Exercise Vital Sign      Days of Exercise per Week: 5 days      Minutes of Exercise per Session: 50 min   Stress: No Stress Concern Present (1/9/2023)    [employer] - Occupational Stress Questionnaire      Feeling of Stress : Not at all   Social Connections: Moderately Integrated (1/9/2023)    Social Connection and Isolation Panel [NHANES]      Frequency of Communication with Friends and Family:  "Twice a week      Frequency of Social Gatherings with Friends and Family: Never      Attends Yarsani Services: More than 4 times per year      Active Member of Clubs or Organizations: Yes      Attends Club or Organization Meetings: Not on file      Marital Status:    Intimate Partner Violence: Not on file   Housing Stability: Low Risk  (1/9/2023)    Housing Stability Vital Sign      Unable to Pay for Housing in the Last Year: No      Number of Places Lived in the Last Year: 2      Unstable Housing in the Last Year: No        FAMILY HISTORY:   Family History   Problem Relation Age of Onset     Arthritis Mother         SLE     Connective Tissue Disorder Mother         lupus     Diabetes Mother      Cerebrovascular Disease Mother      Cancer Mother      Diabetes Father      Coronary Artery Disease Father      Chronic Obstructive Pulmonary Disease Father      Diabetes Sister      Diabetes Maternal Grandfather         EXAM:Vitals: BP (!) 157/81   Ht 1.803 m (5' 11\")   Wt 98.9 kg (218 lb)   BMI 30.40 kg/m    BMI= Body mass index is 30.4 kg/m .      General appearance: Patient is alert and fully cooperative with history & exam.  No sign of distress is noted during the visit.      Respiratory: Breathing is regular & unlabored while sitting.      HEENT: Hearing is intact to spoken word.  Speech is clear.  No gross evidence of visual impairment that would impact ambulation.      Dermatologic:  Right foot: submet three ulcer --> site smaller and improved. Drier. Measures approx .5 cm x .5 cm x .1 cm. Granular wound bed. No probe to bone. Depth to subcutaneous tissue. No cellulitis or significant drainage  Left foot submet 3 ulcer: improved and drier. Submet 4 site now healed. Much less macerated. Dorsal right foot also improved, all toe sites now healed. Sites dry. No signs of infection      Vascular: Dorsalis pedis and posterior tibial pulses are intact & regular bilaterally.  CFT and skin temperature is normal to " both lower extremities.       Neurologic: Lower extremity sensation is diminished, bilateral foot, to light touch.  No evidence of neurological-based weakness or contracture in the lower extremities.       Musculoskeletal: Patient is ambulatory without an assistive device or brace.  S/p right first ray resection. Right 2nd digit --> now more rectus.   S/p left hallux and second digit amputations     Psychiatric: Affect is pleasant & appropriate.      Cultures:     Foot, Right; Tissue   0 Result Notes  Culture 2+ Pseudomonas aeruginosa Abnormal        Isolated in broth only Enterococcus faecalis Abnormal     On day 1 of incubation   1+ Normal josé            Resulting Agency: [facility]     Susceptibility     Pseudomonas aeruginosa Enterococcus faecalis     JEFFREY JEFFREY     Amikacin <=2 ug/mL Susceptible       Ampicillin   <=2 ug/mL Susceptible     Cefepime <=1 ug/mL Susceptible       Ceftazidime <=1 ug/mL Susceptible       Ciprofloxacin 2 ug/mL Resistant       Gentamicin <=1 ug/mL Susceptible       Gentamicin Synergy   Susceptible... Susceptible 1     Levofloxacin >=8 ug/mL Resistant       Meropenem 1 ug/mL Susceptible       Penicillin   2 ug/mL Susceptible     Piperacillin/Tazobactam <=4 ug/mL Susceptible       Tobramycin <=1 ug/mL Susceptible       Vancomycin   1 ug/mL Susceptible                 Foot, Right; Tissue   0 Result Notes  Culture 4+ Bacteroides fragilis Abnormal     Susceptibilities not routinely done   2+ Anaerococcus species Abnormal     Susceptibilities not routinely done        Pathology:     Final Diagnosis   Right third toe, metatarsal head, amputation-  -Bone with changes consistent with extensive acute osteomyelitis.         PROCEDURE:   Verbal consent was obtained for debridement. Done on left foot today. A 15 blade was used to excise the nonivable tissue to the ulcer, down to and including subcutaneous tissue. No noted purulence afterwards.  Measurements are  3 cm x  3.5 cm x .2 cm for both sites.  No  probe to bone. Well tolerated. Site was cleansed with alcohol.      ASSESSMENT:  1. S/p right foot third head resection, fourth metatarsal floating osteotomy, ulcer debridement  --> being treated for residual osteomyelitis, 1/19/23    -Site now open --> slightly smaller today   2. Left foot submet three preulcerative lesion   -Improved   3. Right foot dorsal digital preulcerative lesions --> now healed      MEDICAL DECISION MAKING:   -Patient seen for b/l feet. Feet much improved since last visit.   -Right dorsal digits ulcers, all healed and dry   -Left foot lateral site healed and dry. Submet 3 site smaller and granular. Depth to subcutaneous tissue.   -Feet much improved due to patient walking less. Conversation had that will likely need to consider summer activities from current 10+ hours a day on feet. He understands and agrees.   -Plan to return to work/golf and monitor sites closely. Understands less pressure is best. Discussed ongoing risk/benefits of activity to lower extremity.   -Debridement as above.   -Follow up in 3-4 weeks for close monitoring     Tiny Soto DPM   [facility]le [facility]                Again, thank you for allowing me to participate in the care of your patient.        Sincerely,        Tiny Soto DPM

## 2023-08-23 NOTE — CONFIDENTIAL NOTE
Patient left forms at the  for Dr Soto to complete.     Forms were completed and given to the      Patient was called and informed that the forms were complete and that he could pick them up from the  staff.    He informed me that he will be by tomorrow morning, 8/24/2023, to .    Marilia Matias, FELICIANO, LAT, ATC

## 2023-08-23 NOTE — PROGRESS NOTES
"[facility]  CLINIC NOTE    CHIEF COMPLAINT:  right foot    PATIENT HISTORY:  Crispin Rojas is a 62 year old male who follows up for right foot. Had third metatarsal head excision and fourth metatarsal osteotomy on 1/19/23. He's been on IV abx per ID due to possible residual osteomyelitis that was pseudomonas resistant to all oral options. He's now being transition to oral abx. Since last appt, he states he feels better overall, being off abx. States very little drainage to right foot. Denies pain. Denies N/F/V/C/D.  -At last appt, sutures applied to right foot. States they're intact and site has been totally dry. States left side was worsening since last appt, but it's not improved. States is still limiting walking and applying majority of weight to left side. Denies N/F/V/C/D. Had recent follow up with ID  -Since last appt, has been back to work and on his feet quite a bit.  has been working 10+ hours a day. States some drainage from both feet, but not significant. States has been wearing inserts in shoes. Some drainage from right and left foot. Nontender. States \"feels great\" overall.     -Patient follows up for b/l feet. States has been doing well and has no complaints. States has been walking significantly and golfing daily. States some drainage from both feet, right more than left. States antibiotics have been lowered to one Augmentin daily.     8/2: Follows up today for b/l feet. Hasn't been seen since the end of June.  has been walking and golfing every day. States he thinks left foot has gotten worse. Did have orthotics modified, but notices they are now causing rubbing on his right foot digit. States had some left ankle swelling. Denies N/F/V/C/D     8/23: Follows up today for b/l feet. States he took the last three weeks off from work/golfing. States noticed large improvement since then. Much less drainage and several of the sites have healed. Denies pain. Denies " N/F/V/C/D. Does want to get back to work.     Review of Systems:  A 10 point review of systems was performed and is positive for that noted above in the patient history.  All other areas are negative.     PAST MEDICAL HISTORY:   Past Medical History:   Diagnosis Date    Ascending aorta dilatation (H)     Cerebral infarction (H)     2010    Gastroesophageal reflux disease with esophagitis     H/O blood clots 2022    Hyperlipidemia LDL goal <70     Hypertension     Nonalcoholic steatohepatitis 11/30/2022    Numbness and tingling     Obesity     GILA (obstructive sleep apnea) 10/22/2018    CPAP intolerant    PVD (peripheral vascular disease) (H)     s/p left partial toe amputation    Renal stone     Tremor     Type 2 diabetes mellitus with diabetic polyneuropathy, with long-term current use of insulin (H)         PAST SURGICAL HISTORY:   Past Surgical History:   Procedure Laterality Date    AMPUTATE FOOT Left 8/18/2016    Procedure: AMPUTATE FOOT;  Surgeon: Jack Younger DPM;  Location: [facility]    AMPUTATE TOE(S) Right 2/1/2016    Procedure: AMPUTATE TOE(S);  Surgeon: Rachelle Manriquez DPM, Pod;  Location: [facility]    AMPUTATE TOE(S) Right 8/2/2019    Procedure: Right fourth toe partial amputation for treatment of osteomyelitis.;  Surgeon: Jack Younger DPM;  Location: [facility]    AMPUTATE TOE(S) Left 11/8/2019    Procedure: Left second toe amputation at the metatarsophalangeal joint.;  Surgeon: Rachelle Manriquez DPM, Podiatry/Foot and Ankle Surgery;  Location: [facility]    AMPUTATE TOE(S) Right 6/5/2022    Procedure: AMPUTATION OF RIGHT GREAT TOE;  Surgeon: Wili Quiles DPM;  Location: [facility]    AMPUTATE TOE(S) Right 7/28/2022    Procedure: Right foot partial first metatarsal resection;  Surgeon: Tiny Soto DPM;  Location: [facility]    ANGIOGRAM      BIOPSY BONE FOOT Right 7/24/2022    Procedure: Bone biopsy, right first metatarsal.;  Surgeon: Valentino Molina DPM;  Location:  OR    COLONOSCOPY       COMBINED CYSTOSCOPY, RETROGRADES, URETEROSCOPY, INSERT STENT Left 8/21/2016    Procedure: COMBINED CYSTOSCOPY, RETROGRADES, URETEROSCOPY, INSERT STENT;  Surgeon: Artemio Valenzuela MD;  Location: [facility]    COMBINED CYSTOSCOPY, RETROGRADES, URETEROSCOPY, LASER HOLMIUM LITHOTRIPSY URETER(S), INSERT STENT Left 10/3/2016    Procedure: COMBINED CYSTOSCOPY, RETROGRADES, URETEROSCOPY, LASER HOLMIUM LITHOTRIPSY URETER(S), INSERT STENT;  Surgeon: Artemio Valenzuela MD;  Location: [facility]    EXTRACORPOREAL SHOCK WAVE LITHOTRIPSY (ESWL) Left 9/8/2016    Procedure: EXTRACORPOREAL SHOCK WAVE LITHOTRIPSY (ESWL);  Surgeon: Artemio Valenzuela MD;  Location: [facility]    INCISION AND DRAINAGE FOOT, COMBINED Right 7/24/2022    Procedure: Incision and drainage, right foot ;  Surgeon: Valentino Molina DPM;  Location: [facility]    OSTEOTOMY FOOT Right 11/30/2022    Procedure: 1. Right second metatarsal floating osteotomy  2. Right second digit proximal phalanx arthroplasty 3. Right percutaneous flexor tenotomy;  Surgeon: Tiny Soto DPM;  Location: [facility]    OSTEOTOMY FOOT Right 1/19/2023    Procedure: Right foot third metatarsal head excision, ulcer debridement, matatarsal osteotomies;  Surgeon: Tiny Soto DPM;  Location: [facility]    RECESSION GASTROCNEMIUS Right 2/1/2016    Procedure: RECESSION GASTROCNEMIUS;  Surgeon: Rachelle Manriquez DPM, Pod;  Location: [facility]        MEDICATIONS:  Reviewed in Epic.     ALLERGIES:    Allergies   Allergen Reactions    Statins Swelling     Other reaction(s): Other (see comments)  SIMCOR caused edema in extremities      Bactrim [Sulfamethoxazole-Trimethoprim] Nausea and Vomiting    Sulfa Antibiotics Nausea    Niacin Swelling     SIMCOR caused edema in extremities    Simvastatin Swelling     SIMCOR caused edema in extremities        SOCIAL HISTORY:   Social History     Socioeconomic History    Marital status:      Spouse name: Sydney    Number of children: 2    Years of  education: Not on file    Highest education level: Master's degree (e.g., MA, MS, [place], MEd, MSW, ELIANA)   Occupational History    Occupation: marketing     Employer: [employer]     Comment: [employer]   Tobacco Use    Smoking status: Never    Smokeless tobacco: Never   Vaping Use    Vaping Use: Never used   Substance and Sexual Activity    Alcohol use: Yes     Comment: rare---red wine 3x per month    Drug use: Never    Sexual activity: Not Currently     Partners: Female   Other Topics Concern    Parent/sibling w/ CABG, MI or angioplasty before 65F 55M? No   Social History Narrative    Not on file     Social Determinants of Health     Financial Resource Strain: Low Risk  (1/9/2023)    Overall Financial Resource Strain (CARDIA)     Difficulty of Paying Living Expenses: Not very hard   Food Insecurity: No Food Insecurity (1/9/2023)    Hunger Vital Sign     Worried About Running Out of Food in the Last Year: Never true     Ran Out of Food in the Last Year: Never true   Transportation Needs: No Transportation Needs (1/9/2023)    PRAPARE - Transportation     Lack of Transportation (Medical): No     Lack of Transportation (Non-Medical): No   Physical Activity: Sufficiently Active (1/9/2023)    Exercise Vital Sign     Days of Exercise per Week: 5 days     Minutes of Exercise per Session: 50 min   Stress: No Stress Concern Present (1/9/2023)    [place] [employer] - Occupational Stress Questionnaire     Feeling of Stress : Not at all   Social Connections: Moderately Integrated (1/9/2023)    Social Connection and Isolation Panel [NHANES]     Frequency of Communication with Friends and Family: Twice a week     Frequency of Social Gatherings with Friends and Family: Never     Attends Yazidism Services: More than 4 times per year     Active Member of Clubs or Organizations: Yes     Attends Club or Organization Meetings: Not on file     Marital Status:    Intimate Partner Violence: Not on file  "  Housing Stability: Low Risk  (1/9/2023)    Housing Stability Vital Sign     Unable to Pay for Housing in the Last Year: No     Number of Places Lived in the Last Year: 2     Unstable Housing in the Last Year: No        FAMILY HISTORY:   Family History   Problem Relation Age of Onset    Arthritis Mother         SLE    Connective Tissue Disorder Mother         lupus    Diabetes Mother     Cerebrovascular Disease Mother     Cancer Mother     Diabetes Father     Coronary Artery Disease Father     Chronic Obstructive Pulmonary Disease Father     Diabetes Sister     Diabetes Maternal Grandfather         EXAM:Vitals: BP (!) 157/81   Ht 1.803 m (5' 11\")   Wt 98.9 kg (218 lb)   BMI 30.40 kg/m    BMI= Body mass index is 30.4 kg/m .      General appearance: Patient is alert and fully cooperative with history & exam.  No sign of distress is noted during the visit.      Respiratory: Breathing is regular & unlabored while sitting.      HEENT: Hearing is intact to spoken word.  Speech is clear.  No gross evidence of visual impairment that would impact ambulation.      Dermatologic:  Right foot: submet three ulcer --> site smaller and improved. Drier. Measures approx .5 cm x .5 cm x .1 cm. Granular wound bed. No probe to bone. Depth to subcutaneous tissue. No cellulitis or significant drainage  Left foot submet 3 ulcer: improved and drier. Submet 4 site now healed. Much less macerated. Dorsal right foot also improved, all toe sites now healed. Sites dry. No signs of infection      Vascular: Dorsalis pedis and posterior tibial pulses are intact & regular bilaterally.  CFT and skin temperature is normal to both lower extremities.       Neurologic: Lower extremity sensation is diminished, bilateral foot, to light touch.  No evidence of neurological-based weakness or contracture in the lower extremities.       Musculoskeletal: Patient is ambulatory without an assistive device or brace.  S/p right first ray resection. Right 2nd " digit --> now more rectus.   S/p left hallux and second digit amputations     Psychiatric: Affect is pleasant & appropriate.      Cultures:     Foot, Right; Tissue   0 Result Notes  Culture 2+ Pseudomonas aeruginosa Abnormal        Isolated in broth only Enterococcus faecalis Abnormal     On day 1 of incubation   1+ Normal josé            Resulting Agency: [facility]     Susceptibility     Pseudomonas aeruginosa Enterococcus faecalis     JEFFREY JEFFREY     Amikacin <=2 ug/mL Susceptible       Ampicillin   <=2 ug/mL Susceptible     Cefepime <=1 ug/mL Susceptible       Ceftazidime <=1 ug/mL Susceptible       Ciprofloxacin 2 ug/mL Resistant       Gentamicin <=1 ug/mL Susceptible       Gentamicin Synergy   Susceptible... Susceptible 1     Levofloxacin >=8 ug/mL Resistant       Meropenem 1 ug/mL Susceptible       Penicillin   2 ug/mL Susceptible     Piperacillin/Tazobactam <=4 ug/mL Susceptible       Tobramycin <=1 ug/mL Susceptible       Vancomycin   1 ug/mL Susceptible                 Foot, Right; Tissue   0 Result Notes  Culture 4+ Bacteroides fragilis Abnormal     Susceptibilities not routinely done   2+ Anaerococcus species Abnormal     Susceptibilities not routinely done        Pathology:     Final Diagnosis   Right third toe, metatarsal head, amputation-  -Bone with changes consistent with extensive acute osteomyelitis.         PROCEDURE:   Verbal consent was obtained for debridement. Done on left foot today. A 15 blade was used to excise the nonivable tissue to the ulcer, down to and including subcutaneous tissue. No noted purulence afterwards.  Measurements are  3 cm x  3.5 cm x .2 cm for both sites.  No probe to bone. Well tolerated. Site was cleansed with alcohol.      ASSESSMENT:  1. S/p right foot third head resection, fourth metatarsal floating osteotomy, ulcer debridement  --> being treated for residual osteomyelitis, 1/19/23    -Site now open --> slightly smaller today   2. Left foot submet three preulcerative  lesion   -Improved   3. Right foot dorsal digital preulcerative lesions --> now healed      MEDICAL DECISION MAKING:   -Patient seen for b/l feet. Feet much improved since last visit.   -Right dorsal digits ulcers, all healed and dry   -Left foot lateral site healed and dry. Submet 3 site smaller and granular. Depth to subcutaneous tissue.   -Feet much improved due to patient walking less. Conversation had that will likely need to consider lessening summer activities from current 10+ hours a day on feet. He understands and agrees.    -Plan to return to work/golf and monitor sites closely. Understands less pressure is best. Discussed ongoing risk/benefits of activity to lower extremity.   -Debridement as above.   -Follow up in 3-4 weeks for close monitoring     Tiny Soto DPM   [facility]le [facility]      Addendum: Patient with bilateral foot injuries, due to diabetes and previous infections. He's at high risk for these worsening with excessive walking, which could lead to further surgery, hospitalization and ultimately loss of limb. It's advised he limits activity as much as possible, including use a golf cart at all times. This will likely be necessary indefinitely, but further documentation and reevaluations can be and will be done.

## 2023-09-11 ENCOUNTER — OFFICE VISIT (OUTPATIENT)
Dept: OTOLARYNGOLOGY | Facility: CLINIC | Age: 63
End: 2023-09-11
Payer: COMMERCIAL

## 2023-09-11 VITALS
HEIGHT: 71 IN | OXYGEN SATURATION: 96 % | BODY MASS INDEX: 32.07 KG/M2 | SYSTOLIC BLOOD PRESSURE: 127 MMHG | HEART RATE: 64 BPM | DIASTOLIC BLOOD PRESSURE: 68 MMHG | WEIGHT: 229.1 LBS

## 2023-09-11 DIAGNOSIS — R04.0 EPISTAXIS: Primary | ICD-10-CM

## 2023-09-11 PROCEDURE — 30901 CONTROL OF NOSEBLEED: CPT | Mod: LT | Performed by: OTOLARYNGOLOGY

## 2023-09-11 PROCEDURE — 99213 OFFICE O/P EST LOW 20 MIN: CPT | Mod: 25 | Performed by: OTOLARYNGOLOGY

## 2023-09-11 RX ORDER — PROPRANOLOL HYDROCHLORIDE 20 MG/1
20 TABLET ORAL 2 TIMES DAILY
COMMUNITY
Start: 2023-08-30

## 2023-09-11 ASSESSMENT — PAIN SCALES - GENERAL: PAINLEVEL: NO PAIN (0)

## 2023-09-11 NOTE — NURSING NOTE
"Blood pressure 127/68, pulse 64, height 1.803 m (5' 11\"), weight 103.9 kg (229 lb 1.6 oz), SpO2 96 %.  Randi Washington    "

## 2023-09-11 NOTE — LETTER
9/11/2023       RE: Crispin Rojas  [address] [facility] [address]     Dear Colleague,    Thank you for referring your patient, Crispin Rojas, to the [facility]. Please see a copy of my visit note below.    Patient is here to see us today.  Has been here in this clinic multiple times in the past with other providers.  He has had issues with left caudal septal irritation causing crusting and dryness.  He has treated this with Bactroban ointment with intermittent success.    On exam is a 63-year-old in no acute distress, normal mood normal affect normal ability to communicate alert and appropriate head is normocephalic cranial nerve VII is House-Brackman 1 out of 6 bilaterally breathing without difficulty or stridor.    Examination of the ears show normal external auditory canals and tympanic membranes.    Examination of the nose demonstrates an area of metaplasia and dryness on the left caudal septum.    After discussion of the options, he elected to have this area cauterized with silver nitrate.  Therefore under direct visualization the areas first topically anesthetized with 4% lidocaine and then cauterized with silver nitrate.  The patient tolerated procedure well.    63-year-old with intermittent crusting and dryness of the left caudal septum, cauterized here today, see him back in 2 months.  If no improvement could consider excision.    Nikos Armstrong MD

## 2023-09-11 NOTE — PATIENT INSTRUCTIONS
You were seen in the ENT Clinic today by Dr. Armstrong. If you have any questions or concerns after your appointment, please contact us (see below)     2.   Please return to the clinic in 2 months.              How to Contact Us:  Send a [employer] message to your provider. Our team will respond to you via [employer]. Occasionally, we will need to call you to get further information.  For urgent matters (Monday-Friday), call the ENT Clinic: [phone] and speak with a call center team member - they will route your call appropriately.   If you'd like to speak directly with a nurse, please find our contact information below. We do our best to check voicemail frequently throughout the day, and will work to call you back within 1-2 days. For urgent matters, please use the general clinic phone numbers listed above.        Adelina ORTIZ RN  ENT RN Care Coordinator  Direct: [phone]  Naye MCKINLEY LPN  Direct: [phone]           [facility]  Department of Otolaryngology

## 2023-09-13 ENCOUNTER — OFFICE VISIT (OUTPATIENT)
Dept: PODIATRY | Facility: CLINIC | Age: 63
End: 2023-09-13
Payer: COMMERCIAL

## 2023-09-13 ENCOUNTER — ANCILLARY PROCEDURE (OUTPATIENT)
Dept: GENERAL RADIOLOGY | Facility: CLINIC | Age: 63
End: 2023-09-13
Attending: PODIATRIST
Payer: COMMERCIAL

## 2023-09-13 VITALS — BODY MASS INDEX: 31.94 KG/M2 | SYSTOLIC BLOOD PRESSURE: 124 MMHG | WEIGHT: 229 LBS | DIASTOLIC BLOOD PRESSURE: 64 MMHG

## 2023-09-13 DIAGNOSIS — E11.621 DIABETIC ULCER OF TOE OF LEFT FOOT ASSOCIATED WITH TYPE 2 DIABETES MELLITUS, WITH FAT LAYER EXPOSED (H): ICD-10-CM

## 2023-09-13 DIAGNOSIS — M79.672 LEFT FOOT PAIN: ICD-10-CM

## 2023-09-13 DIAGNOSIS — S98.111A AMPUTATION OF RIGHT GREAT TOE (H): ICD-10-CM

## 2023-09-13 DIAGNOSIS — L97.522 DIABETIC ULCER OF TOE OF LEFT FOOT ASSOCIATED WITH TYPE 2 DIABETES MELLITUS, WITH FAT LAYER EXPOSED (H): ICD-10-CM

## 2023-09-13 DIAGNOSIS — S98.112A AMPUTATION OF LEFT GREAT TOE (H): ICD-10-CM

## 2023-09-13 DIAGNOSIS — M79.672 LEFT FOOT PAIN: Primary | ICD-10-CM

## 2023-09-13 PROCEDURE — 73630 X-RAY EXAM OF FOOT: CPT | Mod: TC | Performed by: RADIOLOGY

## 2023-09-13 PROCEDURE — 99214 OFFICE O/P EST MOD 30 MIN: CPT | Mod: 25 | Performed by: PODIATRIST

## 2023-09-13 PROCEDURE — 11042 DBRDMT SUBQ TIS 1ST 20SQCM/<: CPT | Performed by: PODIATRIST

## 2023-09-13 NOTE — LETTER
"    9/13/2023         RE: Crispin Rojas  [address] [facility] [address]        Dear Colleague,    Thank you for referring your patient, Crispin Rojas, to the [facility]. Please see a copy of my visit note below.    [facility]  CLINIC NOTE    CHIEF COMPLAINT:  right foot    PATIENT HISTORY:  Crispin Rojas is a 62 year old male who follows up for right foot. Had third metatarsal head excision and fourth metatarsal osteotomy on 1/19/23. He's been on IV abx per ID due to possible residual osteomyelitis that was pseudomonas resistant to all oral options. He's now being transition to oral abx. Since last appt, he states he feels better overall, being off abx. States very little drainage to right foot. Denies pain. Denies N/F/V/C/D.  -At last appt, sutures applied to right foot. States they're intact and site has been totally dry. States left side was worsening since last appt, but it's not improved. States is still limiting walking and applying majority of weight to left side. Denies N/F/V/C/D. Had recent follow up with ID  -Since last appt, has been back to work and on his feet quite a bit.  has been working 10+ hours a day. States some drainage from both feet, but not significant.  has been wearing inserts in shoes. Some drainage from right and left foot. Nontender. States \"feels great\" overall.     -Patient follows up for b/l feet.  has been doing well and has no complaints. States has been walking significantly and golfing daily. States some drainage from both feet, right more than left. States antibiotics have been lowered to one Augmentin daily.     8/2: Follows up today for b/l feet. Hasn't been seen since the end of June.  has been walking and golfing every day. States he thinks left foot has gotten worse. Did have orthotics modified, but notices they are now causing rubbing on his right foot digit. States had some " left ankle swelling. Denies N/F/V/C/D     8/23: Follows up today for b/l feet. States he took the last three weeks off from work/golfing. States noticed large improvement since then. Much less drainage and several of the sites have healed. Denies pain. Denies N/F/V/C/D. Does want to get back to work.     9/14: Follows up for b/l feet. States very little or no drainage from right foot. States left foot has been draining a fair amount. Still on his feet all day for work/golf. No pain. Denies N/F/V/C/D. Takes augmentin daily per ID.       Review of Systems:  A 10 point review of systems was performed and is positive for that noted above in the patient history.  All other areas are negative.     PAST MEDICAL HISTORY:   Past Medical History:   Diagnosis Date     Ascending aorta dilatation (H)      Cerebral infarction (H)     2010     Gastroesophageal reflux disease with esophagitis      H/O blood clots 2022     Hyperlipidemia LDL goal <70      Hypertension      Nonalcoholic steatohepatitis 11/30/2022     Numbness and tingling      Obesity      GILA (obstructive sleep apnea) 10/22/2018    CPAP intolerant     PVD (peripheral vascular disease) (H)     s/p left partial toe amputation     Renal stone      Tremor      Type 2 diabetes mellitus with diabetic polyneuropathy, with long-term current use of insulin (H)         PAST SURGICAL HISTORY:   Past Surgical History:   Procedure Laterality Date     AMPUTATE FOOT Left 8/18/2016    Procedure: AMPUTATE FOOT;  Surgeon: Jack Younger DPM;  Location: [facility]     AMPUTATE TOE(S) Right 2/1/2016    Procedure: AMPUTATE TOE(S);  Surgeon: Rachelle Manriquez DPM, Pod;  Location: [facility]     AMPUTATE TOE(S) Right 8/2/2019    Procedure: Right fourth toe partial amputation for treatment of osteomyelitis.;  Surgeon: Jack Younger DPM;  Location: [facility]     AMPUTATE TOE(S) Left 11/8/2019    Procedure: Left second toe amputation at the metatarsophalangeal joint.;  Surgeon: Rachelle Manriquez,  [facility], Podiatry/Foot and Ankle Surgery;  Location: [facility]     AMPUTATE TOE(S) Right 6/5/2022    Procedure: AMPUTATION OF RIGHT GREAT TOE;  Surgeon: Wili Quiles DPM;  Location: [facility]     AMPUTATE TOE(S) Right 7/28/2022    Procedure: Right foot partial first metatarsal resection;  Surgeon: Tiny Soto DPM;  Location: [facility]     ANGIOGRAM       BIOPSY BONE FOOT Right 7/24/2022    Procedure: Bone biopsy, right first metatarsal.;  Surgeon: Valentino Molina DPM;  Location: [facility]     COLONOSCOPY       COMBINED CYSTOSCOPY, RETROGRADES, URETEROSCOPY, INSERT STENT Left 8/21/2016    Procedure: COMBINED CYSTOSCOPY, RETROGRADES, URETEROSCOPY, INSERT STENT;  Surgeon: Artemio Valenzuela MD;  Location: [facility]     COMBINED CYSTOSCOPY, RETROGRADES, URETEROSCOPY, LASER HOLMIUM LITHOTRIPSY URETER(S), INSERT STENT Left 10/3/2016    Procedure: COMBINED CYSTOSCOPY, RETROGRADES, URETEROSCOPY, LASER HOLMIUM LITHOTRIPSY URETER(S), INSERT STENT;  Surgeon: Artemio Valenzuela MD;  Location: [facility]     EXTRACORPOREAL SHOCK WAVE LITHOTRIPSY (ESWL) Left 9/8/2016    Procedure: EXTRACORPOREAL SHOCK WAVE LITHOTRIPSY (ESWL);  Surgeon: Artemio Valenzuela MD;  Location: [facility]     INCISION AND DRAINAGE FOOT, COMBINED Right 7/24/2022    Procedure: Incision and drainage, right foot ;  Surgeon: Valentino Molina DPM;  Location: [facility]     OSTEOTOMY FOOT Right 11/30/2022    Procedure: 1. Right second metatarsal floating osteotomy  2. Right second digit proximal phalanx arthroplasty 3. Right percutaneous flexor tenotomy;  Surgeon: Tiny Soto DPM;  Location: [facility]     OSTEOTOMY FOOT Right 1/19/2023    Procedure: Right foot third metatarsal head excision, ulcer debridement, matatarsal osteotomies;  Surgeon: Tiny Soto DPM;  Location: [facility]     RECESSION GASTROCNEMIUS Right 2/1/2016    Procedure: RECESSION GASTROCNEMIUS;  Surgeon: Rachelle Manriquez DPM, Pod;  Location: [facility]        MEDICATIONS:  Reviewed in Epic.      ALLERGIES:    Allergies   Allergen Reactions     Statins Swelling     Other reaction(s): Other (see comments)  SIMCOR caused edema in extremities       Bactrim [Sulfamethoxazole-Trimethoprim] Nausea and Vomiting     Sulfa Antibiotics Nausea     Niacin Swelling     SIMCOR caused edema in extremities     Simvastatin Swelling     SIMCOR caused edema in extremities        SOCIAL HISTORY:   Social History     Socioeconomic History     Marital status:      Spouse name: Sydney     Number of children: 2     Years of education: Not on file     Highest education level: Master's degree (e.g., MA, MS, [place], MEd, MSW, ELIANA)   Occupational History     Occupation: marketing     Employer: [employer]     Comment: [employer]   Tobacco Use     Smoking status: Never     Smokeless tobacco: Never   Vaping Use     Vaping Use: Never used   Substance and Sexual Activity     Alcohol use: Yes     Comment: rare---red wine 3x per month     Drug use: Never     Sexual activity: Not Currently     Partners: Female   Other Topics Concern     Parent/sibling w/ CABG, MI or angioplasty before 65F 55M? No   Social History Narrative     Not on file     Social Determinants of Health     Financial Resource Strain: Low Risk  (1/9/2023)    Overall Financial Resource Strain (CARDIA)      Difficulty of Paying Living Expenses: Not very hard   Food Insecurity: No Food Insecurity (1/9/2023)    Hunger Vital Sign      Worried About Running Out of Food in the Last Year: Never true      Ran Out of Food in the Last Year: Never true   Transportation Needs: No Transportation Needs (1/9/2023)    PRAPARE - Transportation      Lack of Transportation (Medical): No      Lack of Transportation (Non-Medical): No   Physical Activity: Sufficiently Active (1/9/2023)    Exercise Vital Sign      Days of Exercise per Week: 5 days      Minutes of Exercise per Session: 50 min   Stress: No Stress Concern Present (1/9/2023)    [employer] [place] [employer] -  Occupational Stress Questionnaire      Feeling of Stress : Not at all   Social Connections: Moderately Integrated (1/9/2023)    Social Connection and Isolation Panel [NHANES]      Frequency of Communication with Friends and Family: Twice a week      Frequency of Social Gatherings with Friends and Family: Never      Attends Hindu Services: More than 4 times per year      Active Member of Clubs or Organizations: Yes      Attends Club or Organization Meetings: Not on file      Marital Status:    Intimate Partner Violence: Not on file   Housing Stability: Low Risk  (1/9/2023)    Housing Stability Vital Sign      Unable to Pay for Housing in the Last Year: No      Number of Places Lived in the Last Year: 2      Unstable Housing in the Last Year: No        FAMILY HISTORY:   Family History   Problem Relation Age of Onset     Arthritis Mother         SLE     Connective Tissue Disorder Mother         lupus     Diabetes Mother      Cerebrovascular Disease Mother      Cancer Mother      Diabetes Father      Coronary Artery Disease Father      Chronic Obstructive Pulmonary Disease Father      Diabetes Sister      Diabetes Maternal Grandfather         EXAM:Vitals: /64   Wt 103.9 kg (229 lb)   BMI 31.94 kg/m    BMI= Body mass index is 31.94 kg/m .      General appearance: Patient is alert and fully cooperative with history & exam.  No sign of distress is noted during the visit.      Respiratory: Breathing is regular & unlabored while sitting.      HEENT: Hearing is intact to spoken word.  Speech is clear.  No gross evidence of visual impairment that would impact ambulation.      Dermatologic:  Right foot: submet three ulcer --> site smaller and improved. Drier. Essentially closed and scabbed.     Left foot submet 3 ulcer: improved in size, but is quite deep. No probe directly to bone, but probes approx .6cm. Smaller in length and width, measures approx 2 cm x .4 cm. Does look drier. Submet 4 site now healed.  Much less macerated. Several dorsal digital preulcerative scabs also present.      Vascular: Dorsalis pedis and posterior tibial pulses are intact & regular bilaterally.  CFT and skin temperature is normal to both lower extremities.       Neurologic: Lower extremity sensation is diminished, bilateral foot, to light touch.  No evidence of neurological-based weakness or contracture in the lower extremities.       Musculoskeletal: Patient is ambulatory without an assistive device or brace.  S/p right first ray resection. Right 2nd digit --> now more rectus.   S/p left hallux and second digit amputations     Psychiatric: Affect is pleasant & appropriate.      Cultures:     Foot, Right; Tissue   0 Result Notes  Culture 2+ Pseudomonas aeruginosa Abnormal        Isolated in broth only Enterococcus faecalis Abnormal     On day 1 of incubation   1+ Normal josé            Resulting Agency: [facility]     Susceptibility     Pseudomonas aeruginosa Enterococcus faecalis     JEFFREY JEFFREY     Amikacin <=2 ug/mL Susceptible       Ampicillin   <=2 ug/mL Susceptible     Cefepime <=1 ug/mL Susceptible       Ceftazidime <=1 ug/mL Susceptible       Ciprofloxacin 2 ug/mL Resistant       Gentamicin <=1 ug/mL Susceptible       Gentamicin Synergy   Susceptible... Susceptible 1     Levofloxacin >=8 ug/mL Resistant       Meropenem 1 ug/mL Susceptible       Penicillin   2 ug/mL Susceptible     Piperacillin/Tazobactam <=4 ug/mL Susceptible       Tobramycin <=1 ug/mL Susceptible       Vancomycin   1 ug/mL Susceptible                 Foot, Right; Tissue   0 Result Notes  Culture 4+ Bacteroides fragilis Abnormal     Susceptibilities not routinely done   2+ Anaerococcus species Abnormal     Susceptibilities not routinely done        Pathology:     Final Diagnosis   Right third toe, metatarsal head, amputation-  -Bone with changes consistent with extensive acute osteomyelitis.         Imaging:     IMPRESSION: Partial amputation of the first and second toes at  the MTP  joints. No fracture or osteomyelitis. No degenerative or erosive  arthritic change. Hammer toe deformities.    I personally reviewed the images and agree with the reports as stated.  -No osteomyelitis.     PROCEDURE:   Verbal consent was obtained for debridement. Done on left foot today. A 15 blade was used to excise the nonivable tissue to the ulcer, down to and including subcutaneous tissue. No noted purulence afterwards.  Measurements of debrided site is  2.5 cm x  .5 cm x .6 cm.  No probe to bone. Well tolerated. Site was cleansed with alcohol.      ASSESSMENT:  1. S/p right foot third head resection, fourth metatarsal floating osteotomy, ulcer debridement  --> being treated for residual osteomyelitis, 1/19/23    -Site now open --> slightly smaller today --> now closed   2. Left foot submet three preulcerative lesion   -Smaller in size, but deeper   3. Right foot dorsal digital preulcerative lesions --> recurred. Also present to left foot dorsal digits     MEDICAL DECISION MAKING:   -Patient seen for b/l feet.   -Right foot improved   -Left foot ulcer smaller, but quite deep. Discussed with patient that this is problematic for bone infection. No signs of infection are present today.   -Offered options such as closing site today, but patient declined because he plans to participate in a golf tournament. Discussed with him risks of excessive pressure/walking for further deeping of site   -Xrays taken to evaluate, no signs of infection. May need surgical offloading procedure eventually, but patient does not want to be in a boot or have activity limited   -Discussed risks of infection, including need for amputation or hospitalization.   -Debrided site as above.   -Order also placed for custom molded orthotics   -Has next follow up at [facility]    Tiny Soto DPM   [facility] & Ankle Surgery    Greater than 30 minutes were spent today, reviewing previous hospital records,  counseling and educating the patient on the ongoing treatment plan and coordinating care.                     Again, thank you for allowing me to participate in the care of your patient.        Sincerely,        Tiny Soto DPM

## 2023-09-14 NOTE — PROGRESS NOTES
"[facility]  CLINIC NOTE    CHIEF COMPLAINT:  right foot    PATIENT HISTORY:  Crispin Rojas is a 62 year old male who follows up for right foot. Had third metatarsal head excision and fourth metatarsal osteotomy on 1/19/23. He's been on IV abx per ID due to possible residual osteomyelitis that was pseudomonas resistant to all oral options. He's now being transition to oral abx. Since last appt, he states he feels better overall, being off abx. States very little drainage to right foot. Denies pain. Denies N/F/V/C/D.  -At last appt, sutures applied to right foot. States they're intact and site has been totally dry. States left side was worsening since last appt, but it's not improved. States is still limiting walking and applying majority of weight to left side. Denies N/F/V/C/D. Had recent follow up with ID  -Since last appt, has been back to work and on his feet quite a bit.  has been working 10+ hours a day. States some drainage from both feet, but not significant. States has been wearing inserts in shoes. Some drainage from right and left foot. Nontender. States \"feels great\" overall.     -Patient follows up for b/l feet. States has been doing well and has no complaints. States has been walking significantly and golfing daily. States some drainage from both feet, right more than left. States antibiotics have been lowered to one Augmentin daily.     8/2: Follows up today for b/l feet. Hasn't been seen since the end of June.  has been walking and golfing every day. States he thinks left foot has gotten worse. Did have orthotics modified, but notices they are now causing rubbing on his right foot digit. States had some left ankle swelling. Denies N/F/V/C/D     8/23: Follows up today for b/l feet. States he took the last three weeks off from work/golfing. States noticed large improvement since then. Much less drainage and several of the sites have healed. Denies pain. Denies " N/F/V/C/D. Does want to get back to work.     9/14: Follows up for b/l feet. States very little or no drainage from right foot. States left foot has been draining a fair amount. Still on his feet all day for work/golf. No pain. Denies N/F/V/C/D. Takes augmentin daily per ID.       Review of Systems:  A 10 point review of systems was performed and is positive for that noted above in the patient history.  All other areas are negative.     PAST MEDICAL HISTORY:   Past Medical History:   Diagnosis Date    Ascending aorta dilatation (H)     Cerebral infarction (H)     2010    Gastroesophageal reflux disease with esophagitis     H/O blood clots 2022    Hyperlipidemia LDL goal <70     Hypertension     Nonalcoholic steatohepatitis 11/30/2022    Numbness and tingling     Obesity     GILA (obstructive sleep apnea) 10/22/2018    CPAP intolerant    PVD (peripheral vascular disease) (H)     s/p left partial toe amputation    Renal stone     Tremor     Type 2 diabetes mellitus with diabetic polyneuropathy, with long-term current use of insulin (H)         PAST SURGICAL HISTORY:   Past Surgical History:   Procedure Laterality Date    AMPUTATE FOOT Left 8/18/2016    Procedure: AMPUTATE FOOT;  Surgeon: Jack Younger DPM;  Location: [facility]    AMPUTATE TOE(S) Right 2/1/2016    Procedure: AMPUTATE TOE(S);  Surgeon: Rachelle Manriquez DPM, Pod;  Location: [facility]    AMPUTATE TOE(S) Right 8/2/2019    Procedure: Right fourth toe partial amputation for treatment of osteomyelitis.;  Surgeon: Jack Younger DPM;  Location: [facility]    AMPUTATE TOE(S) Left 11/8/2019    Procedure: Left second toe amputation at the metatarsophalangeal joint.;  Surgeon: Rachelle Manriquez DPM, Podiatry/Foot and Ankle Surgery;  Location: [facility]    AMPUTATE TOE(S) Right 6/5/2022    Procedure: AMPUTATION OF RIGHT GREAT TOE;  Surgeon: Wili Quiles DPM;  Location: [facility]    AMPUTATE TOE(S) Right 7/28/2022    Procedure: Right foot partial first metatarsal  resection;  Surgeon: Tiny Soto DPM;  Location: [facility]    ANGIOGRAM      BIOPSY BONE FOOT Right 7/24/2022    Procedure: Bone biopsy, right first metatarsal.;  Surgeon: Valentino Molina DPM;  Location: [facility]    COLONOSCOPY      COMBINED CYSTOSCOPY, RETROGRADES, URETEROSCOPY, INSERT STENT Left 8/21/2016    Procedure: COMBINED CYSTOSCOPY, RETROGRADES, URETEROSCOPY, INSERT STENT;  Surgeon: Artemio Valenzuela MD;  Location: [facility]    COMBINED CYSTOSCOPY, RETROGRADES, URETEROSCOPY, LASER HOLMIUM LITHOTRIPSY URETER(S), INSERT STENT Left 10/3/2016    Procedure: COMBINED CYSTOSCOPY, RETROGRADES, URETEROSCOPY, LASER HOLMIUM LITHOTRIPSY URETER(S), INSERT STENT;  Surgeon: Artemio Valenzuela MD;  Location: [facility]    EXTRACORPOREAL SHOCK WAVE LITHOTRIPSY (ESWL) Left 9/8/2016    Procedure: EXTRACORPOREAL SHOCK WAVE LITHOTRIPSY (ESWL);  Surgeon: Artemio Valenzuela MD;  Location: [facility]    INCISION AND DRAINAGE FOOT, COMBINED Right 7/24/2022    Procedure: Incision and drainage, right foot ;  Surgeon: Valentino Molina DPM;  Location: [facility]    OSTEOTOMY FOOT Right 11/30/2022    Procedure: 1. Right second metatarsal floating osteotomy  2. Right second digit proximal phalanx arthroplasty 3. Right percutaneous flexor tenotomy;  Surgeon: Tiny Soto DPM;  Location: [facility]    OSTEOTOMY FOOT Right 1/19/2023    Procedure: Right foot third metatarsal head excision, ulcer debridement, matatarsal osteotomies;  Surgeon: Tiny Soto DPM;  Location: [facility]    RECESSION GASTROCNEMIUS Right 2/1/2016    Procedure: RECESSION GASTROCNEMIUS;  Surgeon: Rachelle Manriquez DPM, Pod;  Location: [facility]        MEDICATIONS:  Reviewed in Epic.     ALLERGIES:    Allergies   Allergen Reactions    Statins Swelling     Other reaction(s): Other (see comments)  SIMCOR caused edema in extremities      Bactrim [Sulfamethoxazole-Trimethoprim] Nausea and Vomiting    Sulfa Antibiotics Nausea    Niacin Swelling     SIMCOR caused edema in  extremities    Simvastatin Swelling     SIMCOR caused edema in extremities        SOCIAL HISTORY:   Social History     Socioeconomic History    Marital status:      Spouse name: Sydney    Number of children: 2    Years of education: Not on file    Highest education level: Master's degree (e.g., MA, MS, [place], MEd, MSW, ELIANA)   Occupational History    Occupation: marketing     Employer: [employer]     Comment: [employer]   Tobacco Use    Smoking status: Never    Smokeless tobacco: Never   Vaping Use    Vaping Use: Never used   Substance and Sexual Activity    Alcohol use: Yes     Comment: rare---red wine 3x per month    Drug use: Never    Sexual activity: Not Currently     Partners: Female   Other Topics Concern    Parent/sibling w/ CABG, MI or angioplasty before 65F 55M? No   Social History Narrative    Not on file     Social Determinants of Health     Financial Resource Strain: Low Risk  (1/9/2023)    Overall Financial Resource Strain (CARDIA)     Difficulty of Paying Living Expenses: Not very hard   Food Insecurity: No Food Insecurity (1/9/2023)    Hunger Vital Sign     Worried About Running Out of Food in the Last Year: Never true     Ran Out of Food in the Last Year: Never true   Transportation Needs: No Transportation Needs (1/9/2023)    PRAPARE - Transportation     Lack of Transportation (Medical): No     Lack of Transportation (Non-Medical): No   Physical Activity: Sufficiently Active (1/9/2023)    Exercise Vital Sign     Days of Exercise per Week: 5 days     Minutes of Exercise per Session: 50 min   Stress: No Stress Concern Present (1/9/2023)    [employer] - Occupational Stress Questionnaire     Feeling of Stress : Not at all   Social Connections: Moderately Integrated (1/9/2023)    Social Connection and Isolation Panel [NHANES]     Frequency of Communication with Friends and Family: Twice a week     Frequency of Social Gatherings with Friends and Family: Never     Attends  Christian Services: More than 4 times per year     Active Member of Clubs or Organizations: Yes     Attends Club or Organization Meetings: Not on file     Marital Status:    Intimate Partner Violence: Not on file   Housing Stability: Low Risk  (1/9/2023)    Housing Stability Vital Sign     Unable to Pay for Housing in the Last Year: No     Number of Places Lived in the Last Year: 2     Unstable Housing in the Last Year: No        FAMILY HISTORY:   Family History   Problem Relation Age of Onset    Arthritis Mother         SLE    Connective Tissue Disorder Mother         lupus    Diabetes Mother     Cerebrovascular Disease Mother     Cancer Mother     Diabetes Father     Coronary Artery Disease Father     Chronic Obstructive Pulmonary Disease Father     Diabetes Sister     Diabetes Maternal Grandfather         EXAM:Vitals: /64   Wt 103.9 kg (229 lb)   BMI 31.94 kg/m    BMI= Body mass index is 31.94 kg/m .      General appearance: Patient is alert and fully cooperative with history & exam.  No sign of distress is noted during the visit.      Respiratory: Breathing is regular & unlabored while sitting.      HEENT: Hearing is intact to spoken word.  Speech is clear.  No gross evidence of visual impairment that would impact ambulation.      Dermatologic:  Right foot: submet three ulcer --> site smaller and improved. Drier. Essentially closed and scabbed.     Left foot submet 3 ulcer: improved in size, but is quite deep. No probe directly to bone, but probes approx .6cm. Smaller in length and width, measures approx 2 cm x .4 cm. Does look drier. Submet 4 site now healed. Much less macerated. Several dorsal digital preulcerative scabs also present.      Vascular: Dorsalis pedis and posterior tibial pulses are intact & regular bilaterally.  CFT and skin temperature is normal to both lower extremities.       Neurologic: Lower extremity sensation is diminished, bilateral foot, to light touch.  No evidence of  neurological-based weakness or contracture in the lower extremities.       Musculoskeletal: Patient is ambulatory without an assistive device or brace.  S/p right first ray resection. Right 2nd digit --> now more rectus.   S/p left hallux and second digit amputations     Psychiatric: Affect is pleasant & appropriate.      Cultures:     Foot, Right; Tissue   0 Result Notes  Culture 2+ Pseudomonas aeruginosa Abnormal        Isolated in broth only Enterococcus faecalis Abnormal     On day 1 of incubation   1+ Normal josé            Resulting Agency: [facility]     Susceptibility     Pseudomonas aeruginosa Enterococcus faecalis     JEFFREY JEFFREY     Amikacin <=2 ug/mL Susceptible       Ampicillin   <=2 ug/mL Susceptible     Cefepime <=1 ug/mL Susceptible       Ceftazidime <=1 ug/mL Susceptible       Ciprofloxacin 2 ug/mL Resistant       Gentamicin <=1 ug/mL Susceptible       Gentamicin Synergy   Susceptible... Susceptible 1     Levofloxacin >=8 ug/mL Resistant       Meropenem 1 ug/mL Susceptible       Penicillin   2 ug/mL Susceptible     Piperacillin/Tazobactam <=4 ug/mL Susceptible       Tobramycin <=1 ug/mL Susceptible       Vancomycin   1 ug/mL Susceptible                 Foot, Right; Tissue   0 Result Notes  Culture 4+ Bacteroides fragilis Abnormal     Susceptibilities not routinely done   2+ Anaerococcus species Abnormal     Susceptibilities not routinely done        Pathology:     Final Diagnosis   Right third toe, metatarsal head, amputation-  -Bone with changes consistent with extensive acute osteomyelitis.         Imaging:     IMPRESSION: Partial amputation of the first and second toes at the MTP  joints. No fracture or osteomyelitis. No degenerative or erosive  arthritic change. Hammer toe deformities.    I personally reviewed the images and agree with the reports as stated.  -No osteomyelitis.     PROCEDURE:   Verbal consent was obtained for debridement. Done on left foot today. A 15 blade was used to excise the  nonivable tissue to the ulcer, down to and including subcutaneous tissue. No noted purulence afterwards.  Measurements of debrided site is  2.5 cm x  .5 cm x .6 cm.  No probe to bone. Well tolerated. Site was cleansed with alcohol.      ASSESSMENT:  1. S/p right foot third head resection, fourth metatarsal floating osteotomy, ulcer debridement  --> being treated for residual osteomyelitis, 1/19/23    -Site now open --> slightly smaller today --> now closed   2. Left foot submet three preulcerative lesion   -Smaller in size, but deeper   3. Right foot dorsal digital preulcerative lesions --> recurred. Also present to left foot dorsal digits     MEDICAL DECISION MAKING:   -Patient seen for b/l feet.   -Right foot improved   -Left foot ulcer smaller, but quite deep. Discussed with patient that this is problematic for bone infection. No signs of infection are present today.   -Offered options such as closing site today, but patient declined because he plans to participate in a golf tournament. Discussed with him risks of excessive pressure/walking for further deeping of site   -Xrays taken to evaluate, no signs of infection. May need surgical offloading procedure eventually, but patient does not want to be in a boot or have activity limited   -Discussed risks of infection, including need for amputation or hospitalization.   -Debrided site as above.   -Order also placed for custom molded orthotics   -Has next follow up at [facility]    Tiny Soto DPM   [facility] & Ankle Surgery    Greater than 30 minutes were spent today, reviewing previous hospital records, counseling and educating the patient on the ongoing treatment plan and coordinating care.

## 2023-09-25 ENCOUNTER — HOSPITAL ENCOUNTER (OUTPATIENT)
Dept: WOUND CARE | Facility: CLINIC | Age: 63
Discharge: HOME | End: 2023-09-25
Attending: PODIATRIST | Admitting: PODIATRIST
Payer: COMMERCIAL

## 2023-09-25 VITALS
SYSTOLIC BLOOD PRESSURE: 143 MMHG | TEMPERATURE: 98.3 F | WEIGHT: 229 LBS | HEIGHT: 72 IN | BODY MASS INDEX: 31.02 KG/M2 | DIASTOLIC BLOOD PRESSURE: 67 MMHG | HEART RATE: 75 BPM

## 2023-09-25 DIAGNOSIS — L97.522 CHRONIC FOOT ULCER, LEFT, WITH FAT LAYER EXPOSED (H): Primary | ICD-10-CM

## 2023-09-25 DIAGNOSIS — L97.522 CHRONIC FOOT ULCER WITH FAT LAYER EXPOSED, LEFT (H): ICD-10-CM

## 2023-09-25 PROCEDURE — G0463 HOSPITAL OUTPT CLINIC VISIT: HCPCS | Mod: 25

## 2023-09-25 PROCEDURE — 11042 DBRDMT SUBQ TIS 1ST 20SQCM/<: CPT | Performed by: PODIATRIST

## 2023-09-25 PROCEDURE — 99214 OFFICE O/P EST MOD 30 MIN: CPT | Mod: 25 | Performed by: PODIATRIST

## 2023-09-25 RX ORDER — CIPROFLOXACIN 500 MG/1
500 TABLET, FILM COATED ORAL 2 TIMES DAILY
Qty: 14 TABLET | Refills: 0 | Status: SHIPPED | OUTPATIENT
Start: 2023-09-25 | End: 2023-10-02

## 2023-09-25 NOTE — DISCHARGE INSTRUCTIONS
Crispin Rojas      1960    A DME order for supplies has been placed to [facility]. If there are any issues with your order including not receiving the order please call [facility] at [phone] option 1. They can also provide a tracking number for you if you had supplies shipped to you.    Dressing changes outside of clinic are being performed by Patient    Antibiotics ordered. Please take with food. Take probiotics 2 hours before or 2 hours after antibiotics.    Please call [employer] [phone] to get new shoes/orthotics.    Wound Dressing Change: left plantar foot  - Wash your hands with soap and water before you begin your dressing change and prepare a clean surface for dressings.  - Cleanse with mild unscented soap and water (such as Cetaphil, Cerave or Dove)   - Apply thick, high-quality moisturizer to intact skin (such as CeraVe, Eucerin, Aquaphor or Vanicream)   - Pack with 1/6 Promogran Ludivina Ag 4x4  - Cover/pack top aspect of wound with 1/10 Hydrofera Blue Ready-Transfer 4x5, cut-to-fit size of wound  - Cover with Zetuvit plus silicone border 4x4  - Apply Spandigrip size E from toes to ankle and size F from ankle to knee  Change daily    Wound Dressing Change: dorsal left toe 2  - Wash your hands with soap and water before you begin your dressing change and prepare a clean surface for dressings.  - Cleanse with mild unscented soap and water (such as Cetaphil, Cerave or Dove)   - Apply thick, high-quality moisturizer to intact skin (such as CeraVe, Eucerin, Aquaphor or Vanicream)   - Apply betadine (povidone-iodine); allow to dry  - Apply 1/2 gauze 2x2  - Apply BandAid  Change daily    Compression:   Your compression is Spandigrip and can be removed at night and put back on first thing in the morning.   Please remove compression dressing if toes turn blue and/or tingle and can not be relieved by raising the leg for one hour. If unable to reapply in the morning,  keep compression on until next dressing change.    Limit walking/weight bearing as much and possible. Use AirCast boot with knee roller. Continue shoes with diabetic inserts until you get your new ones from [facility].    Whenever you sit raise your ankle above your hips to promote wound healing.      Keep blood sugars below 150 and A1C below 7        AIDE RiosPJENNIFER September 25, 2023    Call us at [phone] if you have any questions about your wounds, have redness or swelling around your wound, have a fever of 101 degrees Fahrenheit or greater or if you have any other problems or concerns. We answer the phone Monday through Friday 8 am to 4 pm, please leave a message as we check the voicemail frequently throughout the day.     If you had a positive experience please indicate that on your patient satisfaction survey form that [facility] will be sending you.    It was a pleasure meeting with you today.  Thank you for allowing me and my team the privilege of caring for you today.  YOU are the reason we are here, and I truly hope we provided you with the excellent service you deserve.  Please let us know if there is anything else we can do for you so that we can be sure you are leaving completely satisfied with your care experience.      If you have any billing related questions please call the [facility] Business office at [phone]. The clinic staff does not handle billing related matters.    If you are scheduled to have a follow up appointment, you will receive a reminder call the day before your visit. On the appointment day please arrive 15 minutes prior to your appointment time. If you are unable to keep that appointment, please call the clinic to cancel or reschedule. If you are more than 10 minutes late or greater for your scheduled appointment time, the clinic policy is that you may be asked to reschedule.

## 2023-09-26 NOTE — PROGRESS NOTES
[facility] Progress Note    Subject: Patient was seen at wound center for b/l feet. He's known to myself for a long history of foot ulcers. Previously worse on the right foot, now worse on the left foot. He's been extremely active all summer, stating he's been on his feet for 10+ hours a day, knowing the risks of this to his feet. He was referred to the wound care center for further treatment, including possible TCC at some point. Currently, he's participating in a golf tournament. He was seen last week and the left foot ulcer was noted to be deep and xrays were taken, showing no osteomyelitis. He notices his left foot ankle is swollen. He feels well overall.     PMH:   Past Medical History:   Diagnosis Date    Ascending aorta dilatation (H)     Cerebral infarction (H)     2010    Gastroesophageal reflux disease with esophagitis     H/O blood clots 2022    Hyperlipidemia LDL goal <70     Hypertension     Nonalcoholic steatohepatitis 11/30/2022    Numbness and tingling     Obesity     GILA (obstructive sleep apnea) 10/22/2018    CPAP intolerant    PVD (peripheral vascular disease) (H)     s/p left partial toe amputation    Renal stone     Tremor     Type 2 diabetes mellitus with diabetic polyneuropathy, with long-term current use of insulin (H)        Social Hx:   Social History     Socioeconomic History    Marital status:      Spouse name: Sydney    Number of children: 2    Years of education: Not on file    Highest education level: Master's degree (e.g., MA, MS, [place], MEd, MSW, ELIANA)   Occupational History    Occupation: marketing     Employer: [employer]     Comment: [employer]   Tobacco Use    Smoking status: Never    Smokeless tobacco: Never   Vaping Use    Vaping Use: Never used   Substance and Sexual Activity    Alcohol use: Yes     Comment: rare---red wine 3x per month    Drug use: Never    Sexual activity: Not Currently     Partners: Female   Other Topics Concern    Parent/sibling  w/ CABG, MI or angioplasty before 65F 55M? No   Social History Narrative    Not on file     Social Determinants of Health     Financial Resource Strain: Low Risk  (1/9/2023)    Overall Financial Resource Strain (CARDIA)     Difficulty of Paying Living Expenses: Not very hard   Food Insecurity: No Food Insecurity (1/9/2023)    Hunger Vital Sign     Worried About Running Out of Food in the Last Year: Never true     Ran Out of Food in the Last Year: Never true   Transportation Needs: No Transportation Needs (1/9/2023)    PRAPARE - Transportation     Lack of Transportation (Medical): No     Lack of Transportation (Non-Medical): No   Physical Activity: Sufficiently Active (1/9/2023)    Exercise Vital Sign     Days of Exercise per Week: 5 days     Minutes of Exercise per Session: 50 min   Stress: No Stress Concern Present (1/9/2023)    [employer] - Occupational Stress Questionnaire     Feeling of Stress : Not at all   Social Connections: Moderately Integrated (1/9/2023)    Social Connection and Isolation Panel [NHANES]     Frequency of Communication with Friends and Family: Twice a week     Frequency of Social Gatherings with Friends and Family: Never     Attends Confucianist Services: More than 4 times per year     Active Member of Clubs or Organizations: Yes     Attends Club or Organization Meetings: Not on file     Marital Status:    Interpersonal Safety: Not on file   Housing Stability: Low Risk  (1/9/2023)    Housing Stability Vital Sign     Unable to Pay for Housing in the Last Year: No     Number of Places Lived in the Last Year: 2     Unstable Housing in the Last Year: No       Surgical Hx:   Past Surgical History:   Procedure Laterality Date    AMPUTATE FOOT Left 8/18/2016    Procedure: AMPUTATE FOOT;  Surgeon: Jack Younger DPM;  Location: [facility]    AMPUTATE TOE(S) Right 2/1/2016    Procedure: AMPUTATE TOE(S);  Surgeon: Rachelle Manriquez DPM, Pod;  Location: [facility] OR     AMPUTATE TOE(S) Right 8/2/2019    Procedure: Right fourth toe partial amputation for treatment of osteomyelitis.;  Surgeon: Jack Younger DPM;  Location: [facility]    AMPUTATE TOE(S) Left 11/8/2019    Procedure: Left second toe amputation at the metatarsophalangeal joint.;  Surgeon: Rachelle Manriquez DPM, Podiatry/Foot and Ankle Surgery;  Location: [facility] OR    AMPUTATE TOE(S) Right 6/5/2022    Procedure: AMPUTATION OF RIGHT GREAT TOE;  Surgeon: Wili Quiles DPM;  Location: [facility] OR    AMPUTATE TOE(S) Right 7/28/2022    Procedure: Right foot partial first metatarsal resection;  Surgeon: Tiny Soto DPM;  Location: [facility] OR    ANGIOGRAM      BIOPSY BONE FOOT Right 7/24/2022    Procedure: Bone biopsy, right first metatarsal.;  Surgeon: Valentino Molina DPM;  Location: [facility] OR    COLONOSCOPY      COMBINED CYSTOSCOPY, RETROGRADES, URETEROSCOPY, INSERT STENT Left 8/21/2016    Procedure: COMBINED CYSTOSCOPY, RETROGRADES, URETEROSCOPY, INSERT STENT;  Surgeon: Artemio Valenzuela MD;  Location: [facility]    COMBINED CYSTOSCOPY, RETROGRADES, URETEROSCOPY, LASER HOLMIUM LITHOTRIPSY URETER(S), INSERT STENT Left 10/3/2016    Procedure: COMBINED CYSTOSCOPY, RETROGRADES, URETEROSCOPY, LASER HOLMIUM LITHOTRIPSY URETER(S), INSERT STENT;  Surgeon: Artemio Valenzuela MD;  Location: [facility]    EXTRACORPOREAL SHOCK WAVE LITHOTRIPSY (ESWL) Left 9/8/2016    Procedure: EXTRACORPOREAL SHOCK WAVE LITHOTRIPSY (ESWL);  Surgeon: Artemio Valenzuela MD;  Location: [facility] OR    INCISION AND DRAINAGE FOOT, COMBINED Right 7/24/2022    Procedure: Incision and drainage, right foot ;  Surgeon: Valentino Molina DPM;  Location: [facility]    OSTEOTOMY FOOT Right 11/30/2022    Procedure: 1. Right second metatarsal floating osteotomy  2. Right second digit proximal phalanx arthroplasty 3. Right percutaneous flexor tenotomy;  Surgeon: Tiny Soto DPM;  Location: [facility]    OSTEOTOMY FOOT Right 1/19/2023    Procedure: Right foot third  metatarsal head excision, ulcer debridement, matatarsal osteotomies;  Surgeon: Tiny Soto DPM;  Location: [facility] OR    RECESSION GASTROCNEMIUS Right 2/1/2016    Procedure: RECESSION GASTROCNEMIUS;  Surgeon: Rachelle Manriquez DPM, Pod;  Location: [facility]       Allergies:    Allergies   Allergen Reactions    Statins Swelling     Other reaction(s): Other (see comments)  SIMCOR caused edema in extremities      Bactrim [Sulfamethoxazole-Trimethoprim] Nausea and Vomiting    Sulfa Antibiotics Nausea    Niacin Swelling     SIMCOR caused edema in extremities    Simvastatin Swelling     SIMCOR caused edema in extremities       Medications:   Current Outpatient Medications   Medication    ciprofloxacin (CIPRO) 500 MG tablet    amLODIPine (NORVASC) 5 MG tablet    atorvastatin (LIPITOR) 40 MG tablet    blood glucose (NO BRAND SPECIFIED) lancets standard    calcium carbonate (OS-NARA) 500 MG tablet    Cholecalciferol (VITAMIN D3) 25 MCG (1000 UT) CAPS    Co-Enzyme Q10 100 MG CAPS    Continuous Blood Gluc Sensor (FREESTYLE LUCERO 14 DAY SENSOR) MISC    ELIQUIS ANTICOAGULANT 5 MG tablet    gabapentin (NEURONTIN) 100 MG capsule    hydrochlorothiazide (MICROZIDE) 12.5 MG capsule    Insulin Aspart FlexPen 100 UNIT/ML SOPN    insulin degludec (TRESIBA FLEXTOUCH) 100 UNIT/ML pen    insulin pen needle (B-D U/F) 31G X 5 MM miscellaneous    ketoconazole (NIZORAL) 2 % external shampoo    lisinopril (ZESTRIL) 40 MG tablet    MAGNESIUM GLYCINATE PLUS PO    metFORMIN (GLUCOPHAGE XR) 500 MG 24 hr tablet    Multiple Vitamins-Minerals (MULTIVITAMIN PO)    mupirocin (BACTROBAN) 2 % external ointment    Omega-3 Fatty Acids (OMEGA-3 FISH OIL PO)    omeprazole (PRILOSEC) 40 MG DR capsule    primidone (MYSOLINE) 50 MG tablet    propranolol (INDERAL) 20 MG tablet    Semaglutide, 1 MG/DOSE, (OZEMPIC, 1 MG/DOSE,) 4 MG/3ML pen    tadalafil (CIALIS) 5 MG tablet     No current facility-administered medications for this encounter.         Objective:  Vitals:   "BP (!) 143/67 (BP Location: Left arm, Patient Position: Sitting)   Pulse 75   Temp 98.3  F (36.8  C) (Temporal)   Ht 1.816 m (5' 11.5\")   Wt 103.9 kg (229 lb)   BMI 31.49 kg/m      A1C: 6.0 on 7/10/23    General:  Patient is alert and orientated.  NAD     Dermatologic: Right foot plantar ulcer: closed and healed   Left foot submet 3 ulcer: probes to bone. No purulence or cellulitis. No significant drainage.   Dorsal third digit with superficial ulcer also present. Depth to subcutaneous tissue at this site.   Mild edema to left ankle.     .   Wound (used by [facility] only) 09/25/23 1505 Left dorsal 3 toe diabetic ulcer (Active)   Thickness/Stage full thickness 09/25/23 1500   Base red 09/25/23 1500   Periwound swelling 09/25/23 1500   Periwound Temperature warm 09/25/23 1500   Periwound Skin Turgor soft 09/25/23 1500   Edges open 09/25/23 1500   Length (cm) 0.4 09/25/23 1500   Width (cm) 0.3 09/25/23 1500   Depth (cm) 0.2 09/25/23 1500   Wound (cm^2) 0.12 cm^2 09/25/23 1500   Wound Volume (cm^3) 0.02 cm^3 09/25/23 1500   Drainage Characteristics/Odor serosanguineous 09/25/23 1500   Drainage Amount moderate 09/25/23 1500   Care, Wound debrided 09/25/23 1500       Wound (used by [facility] only) 09/25/23 1505 Left plantar foot diabetic ulcer (Active)   Thickness/Stage full thickness 09/25/23 1500   Base pink;exposed structure 09/25/23 1500   Periwound macerated 09/25/23 1500   Periwound Temperature warm 09/25/23 1500   Periwound Skin Turgor soft 09/25/23 1500   Edges callused 09/25/23 1500   Length (cm) 1.9 09/25/23 1500   Width (cm) 0.3 09/25/23 1500   Depth (cm) 1.8 09/25/23 1500   Wound (cm^2) 0.57 cm^2 09/25/23 1500   Wound Volume (cm^3) 1.03 cm^3 09/25/23 1500   Drainage Characteristics/Odor serosanguineous 09/25/23 1500   Drainage Amount large 09/25/23 1500   Care, Wound debrided 09/25/23 1500       Incision/Surgical Site 07/28/22 Right Foot (Active)       Incision/Surgical Site 11/30/22 Anterior;Right Toe (Comment "  which one) (Active)       Incision/Surgical Site 01/19/23 Right Toe (Comment  which one) (Active)          Vascular: DP & PT pulses are intact & regular bilaterally.  No significant edema or varicosities noted.  CFT and skin temperature is normal to both lower extremities.     Neurologic: Lower extremity sensation is absent      Musculoskeletal: Patient is ambulatory without assistive device or brace s/p several b/l ulcers including right first ray resection and left foot hallux and second digit amputation.     Imaging:    Left foot XR 9/13     IMPRESSION: Partial amputation of the first and second toes at the MTP  joints. No fracture or osteomyelitis. No degenerative or erosive  arthritic change. Hammer toe deformities.    Cultures:  None recent     Assessment:   1. S/p right foot third head resection, fourth metatarsal floating osteotomy, ulcer debridement  --> being treated for residual osteomyelitis, 1/19/23               -Site now open --> slightly smaller today --> remains closed   2. Left foot submet three ulcer               -Probes to bone today   3. Left foot dorsal third digit ulcer     Plan:    -Discussed all findings with patient. Chart and imaging reviewed.   -Continued discussion had of risk that patient's significant activity places on his feet and that he'll end up with osteomyelitis to the left foot at some point.   -He agrees to stop golfing/working and be NWB to E following this discussion.   -Debrided left side, no significant signs of infection, but ankle with some edema. Ciprofloxacin sent to pharmacy. Currently takes suppressive dose of augmentin, managed by ID   -Discussed need to follow up with [facility] regarding CMOs. This hasn't been done yet.  -Order placed for knee scooter. Patient to use this and air cast boot.   -Has many plans coming up (wedding, hiking and trip to [place].) Discussed need to offload and heal left foot prior to these. High risk for ongoing infection.   -Dressing  supplies ordered   -Follow up in 2-3 weeks     Procedure:  After verbal consent, per protocol lidocaine was applied to the wound. Excisional debridement was performed on ulcer.   #15 blade was used to debride ulcer down to and including subcutaneous tissue. Bleeding controlled with light pressure.  No drainage noted.   < 20sq cm debrided.  Dry dressing applied to foot.  Patient tolerated procedure well.    Tiny Soto DPM    Greater than 30 minutes were spent today, reviewing previous hospital records, counseling and educating the patient on the ongoing treatment plan and coordinating care.                     Further instructions from your care team         Crispin Rojas      1960    A DME order for supplies has been placed to [facility]. If there are any issues with your order including not receiving the order please call [facility] at [phone] option 1. They can also provide a tracking number for you if you had supplies shipped to you.    Dressing changes outside of clinic are being performed by Patient    Antibiotics ordered. Please take with food. Take probiotics 2 hours before or 2 hours after antibiotics.    Please call [email or web address] [phone] to get new shoes/orthotics.    Wound Dressing Change: left plantar foot  - Wash your hands with soap and water before you begin your dressing change and prepare a clean surface for dressings.  - Cleanse with mild unscented soap and water (such as Cetaphil, Cerave or Dove)   - Apply thick, high-quality moisturizer to intact skin (such as CeraVe, Eucerin, Aquaphor or Vanicream)   - Pack with 1/6 Promogran Ludivina Ag 4x4  - Cover/pack top aspect of wound with 1/10 Hydrofera Blue Ready-Transfer 4x5, cut-to-fit size of wound  - Cover with Zetuvit plus silicone border 4x4  - Apply Spandigrip size E from toes to ankle and size F from ankle to knee  Change daily    Wound Dressing Change: dorsal left toe 2  - Wash your hands with  soap and water before you begin your dressing change and prepare a clean surface for dressings.  - Cleanse with mild unscented soap and water (such as Cetaphil, Cerave or Dove)   - Apply thick, high-quality moisturizer to intact skin (such as CeraVe, Eucerin, Aquaphor or Vanicream)   - Apply betadine (povidone-iodine); allow to dry  - Apply 1/2 gauze 2x2  - Apply BandAid  Change daily    Compression:   Your compression is Spandigrip and can be removed at night and put back on first thing in the morning.   Please remove compression dressing if toes turn blue and/or tingle and can not be relieved by raising the leg for one hour. If unable to reapply in the morning, keep compression on until next dressing change.    Limit walking/weight bearing as much and possible. Use AirCast boot with knee roller. Continue shoes with diabetic inserts until you get your new ones from [employer].    Whenever you sit raise your ankle above your hips to promote wound healing.      Keep blood sugars below 150 and A1C below 7        GONZALO Rios.P.M. September 25, 2023    Call us at [phone] if you have any questions about your wounds, have redness or swelling around your wound, have a fever of 101 degrees Fahrenheit or greater or if you have any other problems or concerns. We answer the phone Monday through Friday 8 am to 4 pm, please leave a message as we check the voicemail frequently throughout the day.     If you had a positive experience please indicate that on your patient satisfaction survey form that [facility] will be sending you.    It was a pleasure meeting with you today.  Thank you for allowing me and my team the privilege of caring for you today.  YOU are the reason we are here, and I truly hope we provided you with the excellent service you deserve.  Please let us know if there is anything else we can do for you so that we can be sure you are leaving completely satisfied with your care experience.      If you  have any billing related questions please call the [facility] Business office at [phone]. The clinic staff does not handle billing related matters.    If you are scheduled to have a follow up appointment, you will receive a reminder call the day before your visit. On the appointment day please arrive 15 minutes prior to your appointment time. If you are unable to keep that appointment, please call the clinic to cancel or reschedule. If you are more than 10 minutes late or greater for your scheduled appointment time, the clinic policy is that you may be asked to reschedule.

## 2023-10-09 ENCOUNTER — MYC MEDICAL ADVICE (OUTPATIENT)
Dept: OTOLARYNGOLOGY | Facility: CLINIC | Age: 63
End: 2023-10-09
Payer: COMMERCIAL

## 2023-10-16 ENCOUNTER — HOSPITAL ENCOUNTER (OUTPATIENT)
Dept: MRI IMAGING | Facility: CLINIC | Age: 63
Discharge: HOME | End: 2023-10-16
Attending: PODIATRIST | Admitting: PODIATRIST
Payer: COMMERCIAL

## 2023-10-16 ENCOUNTER — HOSPITAL ENCOUNTER (OUTPATIENT)
Dept: WOUND CARE | Facility: CLINIC | Age: 63
Discharge: HOME | End: 2023-10-16
Payer: COMMERCIAL

## 2023-10-16 ENCOUNTER — TELEPHONE (OUTPATIENT)
Dept: WOUND CARE | Facility: CLINIC | Age: 63
End: 2023-10-16
Payer: COMMERCIAL

## 2023-10-16 VITALS
DIASTOLIC BLOOD PRESSURE: 68 MMHG | TEMPERATURE: 97.7 F | HEART RATE: 63 BPM | SYSTOLIC BLOOD PRESSURE: 138 MMHG | RESPIRATION RATE: 20 BRPM

## 2023-10-16 DIAGNOSIS — L97.909 DIABETIC ULCER OF LOWER EXTREMITY (H): Primary | ICD-10-CM

## 2023-10-16 DIAGNOSIS — M86.172 OTHER ACUTE OSTEOMYELITIS OF LEFT FOOT (H): ICD-10-CM

## 2023-10-16 DIAGNOSIS — E11.622 DIABETIC ULCER OF LOWER EXTREMITY (H): ICD-10-CM

## 2023-10-16 DIAGNOSIS — L97.522 CHRONIC FOOT ULCER WITH FAT LAYER EXPOSED, LEFT (H): ICD-10-CM

## 2023-10-16 DIAGNOSIS — E11.622 DIABETIC ULCER OF LOWER EXTREMITY (H): Primary | ICD-10-CM

## 2023-10-16 DIAGNOSIS — L97.909 DIABETIC ULCER OF LOWER EXTREMITY (H): ICD-10-CM

## 2023-10-16 PROCEDURE — 11042 DBRDMT SUBQ TIS 1ST 20SQCM/<: CPT | Performed by: PODIATRIST

## 2023-10-16 PROCEDURE — 99214 OFFICE O/P EST MOD 30 MIN: CPT | Mod: 25 | Performed by: PODIATRIST

## 2023-10-16 PROCEDURE — 73718 MRI LOWER EXTREMITY W/O DYE: CPT | Mod: LT

## 2023-10-16 RX ORDER — CIPROFLOXACIN 500 MG/1
500 TABLET, FILM COATED ORAL 2 TIMES DAILY
Qty: 14 TABLET | Refills: 0 | Status: SHIPPED | OUTPATIENT
Start: 2023-10-16 | End: 2023-10-23

## 2023-10-16 NOTE — DISCHARGE INSTRUCTIONS
Crispin Rojas      1960    A DME order for supplies has been placed to [facility], [address]. If there are any issues with your order including not receiving the order please call [facility] at [phone] option 1. They can also provide a tracking number for you if you had supplies shipped to you.    Dressing changes outside of clinic are being performed by Patient    PLAN:  Antibiotics. Please take with food. Take probiotics 2 hours before or 2 hours after antibiotics.   - Continue to take your Augmentin   - Ordered New Rx for Ciprofloxacin for 7 days.  Please call the scheduling  to schedule your Left Foot MRI appointment at:  [facility]: [phone]  [facility] imaging for all locations: [phone]  [facility]: [phone]  [facility] on 1st floor [address] [phone]  Please call [employer] [phone] to get new shoes/orthotics.    Wound Dressing Change: left plantar foot  - Wash your hands with soap and water before you begin your dressing change and prepare a clean surface for dressings.  - Cleanse with mild unscented soap and water (such as Cetaphil, Cerave or Dove)   - Apply thick, high-quality moisturizer to intact skin (such as CeraVe, Eucerin, Aquaphor or Vanicream)   - Pack with 1/6 Promogran Ludivina Ag 4x4  - Cover/pack top aspect of wound with 1/10 Hydrofera Blue Ready-Transfer 4x5, cut-to-fit size of wound  - Cover with Zetuvit plus silicone border 4x4  - Apply Spandigrip size E from toes to ankle and size F from ankle to knee  Change daily     Wound Dressing Change: dorsal left toe 2  - Wash your hands with soap and water before you begin your dressing change and prepare a clean surface for dressings.  - Cleanse with mild unscented soap and water (such as Cetaphil, Cerave or Dove)   - Apply thick, high-quality moisturizer to intact skin (such as CeraVe, Eucerin, Aquaphor or Vanicream)   Change daily      Compression:   Your compression is Spandigrip and can be removed at night and put back on first thing in the morning.   Please remove compression dressing if toes turn blue and/or tingle and can not be relieved by raising the leg for one hour. If unable to reapply in the morning, keep compression on until next dressing change.     Limit walking/weight bearing as much and possible. Use AirCast boot with knee roller. Continue shoes with diabetic inserts until you get your new ones from [employer].     Whenever you sit raise your ankle above your hips to promote wound healing.       Keep blood sugars below 150 and A1C below 7           GONZALO Rios.P.MRiley October 16, 2023    Call us at [phone] if you have any questions about your wounds, have redness or swelling around your wound, have a fever of 101 degrees Fahrenheit or greater or if you have any other problems or concerns. We answer the phone Monday through Friday 8 am to 4 pm, please leave a message as we check the voicemail frequently throughout the day.     If you had a positive experience please indicate that on your patient satisfaction survey form that [facility] will be sending you.    It was a pleasure meeting with you today.  Thank you for allowing me and my team the privilege of caring for you today.  YOU are the reason we are here, and I truly hope we provided you with the excellent service you deserve.  Please let us know if there is anything else we can do for you so that we can be sure you are leaving completely satisfied with your care experience.      If you have any billing related questions please call the [facility] Business office at [phone]. The clinic staff does not handle billing related matters.    If you are scheduled to have a follow up appointment, you will receive a reminder call the day before your visit. On the appointment day please arrive 15 minutes prior to your appointment time. If you are unable to keep that  appointment, please call the clinic to cancel or reschedule. If you are more than 10 minutes late or greater for your scheduled appointment time, the clinic policy is that you may be asked to reschedule.

## 2023-10-17 ENCOUNTER — TELEPHONE (OUTPATIENT)
Dept: FAMILY MEDICINE | Facility: CLINIC | Age: 63
End: 2023-10-17

## 2023-10-17 ENCOUNTER — TELEPHONE (OUTPATIENT)
Dept: PODIATRY | Facility: CLINIC | Age: 63
End: 2023-10-17

## 2023-10-17 DIAGNOSIS — L97.521 ULCER OF LEFT FOOT, LIMITED TO BREAKDOWN OF SKIN (H): ICD-10-CM

## 2023-10-17 DIAGNOSIS — M86.172 OTHER ACUTE OSTEOMYELITIS OF LEFT FOOT (H): Primary | ICD-10-CM

## 2023-10-17 PROCEDURE — 99213 OFFICE O/P EST LOW 20 MIN: CPT | Performed by: PODIATRIST

## 2023-10-17 NOTE — TELEPHONE ENCOUNTER
Patient has been scheduled for surgery. Details are below.    Date of Surgery: 10/19/23    Approximate Arrival Time: 10am    Surgeon:  Dr. Tiny Zee     Procedure: Left foot second metatarsal resection, left plantar foot debridement (Left)   Location: [facility], [address] [place] [address]  Surgery Consult: an  PreOp Physical: patient is calling    Packet Mailed/MyChart Sent: yes  Added to [place]: yes

## 2023-10-17 NOTE — TELEPHONE ENCOUNTER
Reason for Call:  Appointment Request    Patient requesting this type of appt: Pre-op    Requested provider: Johann Serna or any provider at clinic    Reason patient unable to be scheduled: Not within requested timeframe    When does patient want to be seen/preferred time: Same day    Comments: Pt requesting to get in for pre-op tomorrow 10/18/23 for his surgery on 10/19/23 in order to get infected bone in his left foot removed.    Could we send this information to you in [facility] or would you prefer to receive a phone call?:   Patient would prefer a phone call   Okay to leave a detailed message?: Yes at Cell number on file:    Telephone Information:   Mobile [phone]       Call taken on 10/17/2023 at 6:24 PM by Maddy Lentz

## 2023-10-18 ENCOUNTER — OFFICE VISIT (OUTPATIENT)
Dept: FAMILY MEDICINE | Facility: CLINIC | Age: 63
End: 2023-10-18
Payer: COMMERCIAL

## 2023-10-18 VITALS
TEMPERATURE: 98.1 F | RESPIRATION RATE: 15 BRPM | WEIGHT: 220 LBS | DIASTOLIC BLOOD PRESSURE: 75 MMHG | HEIGHT: 72 IN | SYSTOLIC BLOOD PRESSURE: 122 MMHG | OXYGEN SATURATION: 95 % | HEART RATE: 60 BPM | BODY MASS INDEX: 29.8 KG/M2

## 2023-10-18 DIAGNOSIS — M86.172 OTHER ACUTE OSTEOMYELITIS OF LEFT FOOT (H): ICD-10-CM

## 2023-10-18 DIAGNOSIS — Z01.818 PREOP GENERAL PHYSICAL EXAM: Primary | ICD-10-CM

## 2023-10-18 LAB — HGB BLD-MCNC: 11.5 G/DL (ref 13.3–17.7)

## 2023-10-18 PROCEDURE — 93000 ELECTROCARDIOGRAM COMPLETE: CPT | Performed by: PHYSICIAN ASSISTANT

## 2023-10-18 PROCEDURE — 85018 HEMOGLOBIN: CPT | Performed by: PHYSICIAN ASSISTANT

## 2023-10-18 PROCEDURE — 99214 OFFICE O/P EST MOD 30 MIN: CPT | Mod: 25 | Performed by: PHYSICIAN ASSISTANT

## 2023-10-18 PROCEDURE — 36415 COLL VENOUS BLD VENIPUNCTURE: CPT | Performed by: PHYSICIAN ASSISTANT

## 2023-10-18 SDOH — HEALTH STABILITY: PHYSICAL HEALTH: ON AVERAGE, HOW MANY DAYS PER WEEK DO YOU ENGAGE IN MODERATE TO STRENUOUS EXERCISE (LIKE A BRISK WALK)?: 0 DAYS

## 2023-10-18 ASSESSMENT — SOCIAL DETERMINANTS OF HEALTH (SDOH)
HOW OFTEN DO YOU ATTEND CHURCH OR RELIGIOUS SERVICES?: MORE THAN 4 TIMES PER YEAR
DO YOU BELONG TO ANY CLUBS OR ORGANIZATIONS SUCH AS CHURCH GROUPS UNIONS, FRATERNAL OR ATHLETIC GROUPS, OR SCHOOL GROUPS?: YES
HOW OFTEN DO YOU ATTENT MEETINGS OF THE CLUB OR ORGANIZATION YOU BELONG TO?: MORE THAN 4 TIMES PER YEAR
HOW OFTEN DO YOU GET TOGETHER WITH FRIENDS OR RELATIVES?: NEVER
IN A TYPICAL WEEK, HOW MANY TIMES DO YOU TALK ON THE PHONE WITH FAMILY, FRIENDS, OR NEIGHBORS?: TWICE A WEEK

## 2023-10-18 ASSESSMENT — LIFESTYLE VARIABLES
HOW MANY STANDARD DRINKS CONTAINING ALCOHOL DO YOU HAVE ON A TYPICAL DAY: 1 OR 2
HOW OFTEN DO YOU HAVE A DRINK CONTAINING ALCOHOL: MONTHLY OR LESS

## 2023-10-18 NOTE — PROGRESS NOTES
[facility] Progress Note    Subject: Patient was seen at wound center for b/l feet. He's known to myself for a long history of foot ulcers. Previously worse on the right foot, now worse on the left foot. He's been extremely active all summer, stating he's been on his feet for 10+ hours a day, knowing the risks of this to his feet. He was referred to the wound care center for further treatment, including possible TCC at some point. Currently, he's participating in a golf tournament. He was seen last week and the left foot ulcer was noted to be deep and xrays were taken, showing no osteomyelitis. He notices his left foot ankle is swollen. He feels well overall     PMH:   Past Medical History:   Diagnosis Date    Ascending aorta dilatation (H)     Cerebral infarction (H)     2010    Gastroesophageal reflux disease with esophagitis     H/O blood clots 2022    Hyperlipidemia LDL goal <70     Hypertension     Nonalcoholic steatohepatitis 11/30/2022    Numbness and tingling     Obesity     GILA (obstructive sleep apnea) 10/22/2018    CPAP intolerant    PVD (peripheral vascular disease) (H)     s/p left partial toe amputation    Renal stone     Tremor     Type 2 diabetes mellitus with diabetic polyneuropathy, with long-term current use of insulin (H)        Social Hx:   Social History     Socioeconomic History    Marital status:      Spouse name: Sydney    Number of children: 2    Years of education: Not on file    Highest education level: Master's degree (e.g., MA, MS, [place], MEd, MSW, ELIANA)   Occupational History    Occupation: marketing     Employer: [employer]     Comment: [employer]   Tobacco Use    Smoking status: Never    Smokeless tobacco: Never   Vaping Use    Vaping Use: Never used   Substance and Sexual Activity    Alcohol use: Yes     Comment: rare---red wine 3x per month    Drug use: Never    Sexual activity: Not Currently     Partners: Female   Other Topics Concern    Parent/sibling  w/ CABG, MI or angioplasty before 65F 55M? No   Social History Narrative    Not on file     Social Determinants of Health     Financial Resource Strain: Low Risk  (1/9/2023)    Overall Financial Resource Strain (CARDIA)     Difficulty of Paying Living Expenses: Not very hard   Food Insecurity: No Food Insecurity (1/9/2023)    Hunger Vital Sign     Worried About Running Out of Food in the Last Year: Never true     Ran Out of Food in the Last Year: Never true   Transportation Needs: No Transportation Needs (1/9/2023)    PRAPARE - Transportation     Lack of Transportation (Medical): No     Lack of Transportation (Non-Medical): No   Physical Activity: Sufficiently Active (1/9/2023)    Exercise Vital Sign     Days of Exercise per Week: 5 days     Minutes of Exercise per Session: 50 min   Stress: No Stress Concern Present (1/9/2023)    [employer] - Occupational Stress Questionnaire     Feeling of Stress : Not at all   Social Connections: Moderately Integrated (1/9/2023)    Social Connection and Isolation Panel [NHANES]     Frequency of Communication with Friends and Family: Twice a week     Frequency of Social Gatherings with Friends and Family: Never     Attends Yazidi Services: More than 4 times per year     Active Member of Clubs or Organizations: Yes     Attends Club or Organization Meetings: Not on file     Marital Status:    Interpersonal Safety: Not on file   Housing Stability: Low Risk  (1/9/2023)    Housing Stability Vital Sign     Unable to Pay for Housing in the Last Year: No     Number of Places Lived in the Last Year: 2     Unstable Housing in the Last Year: No       Surgical Hx:   Past Surgical History:   Procedure Laterality Date    AMPUTATE FOOT Left 8/18/2016    Procedure: AMPUTATE FOOT;  Surgeon: Jack Younger DPM;  Location: [facility]    AMPUTATE TOE(S) Right 2/1/2016    Procedure: AMPUTATE TOE(S);  Surgeon: Rachelle Manrqiuez DPM, Pod;  Location: [facility] OR     AMPUTATE TOE(S) Right 8/2/2019    Procedure: Right fourth toe partial amputation for treatment of osteomyelitis.;  Surgeon: Jack Younger DPM;  Location: [facility] OR    AMPUTATE TOE(S) Left 11/8/2019    Procedure: Left second toe amputation at the metatarsophalangeal joint.;  Surgeon: Rachelle Manriquez DPM, Podiatry/Foot and Ankle Surgery;  Location: [facility] OR    AMPUTATE TOE(S) Right 6/5/2022    Procedure: AMPUTATION OF RIGHT GREAT TOE;  Surgeon: Wili Quiles DPM;  Location: [facility] OR    AMPUTATE TOE(S) Right 7/28/2022    Procedure: Right foot partial first metatarsal resection;  Surgeon: Tiny Soto DPM;  Location: [facility] OR    ANGIOGRAM      BIOPSY BONE FOOT Right 7/24/2022    Procedure: Bone biopsy, right first metatarsal.;  Surgeon: Valentino Molina DPM;  Location: [facility] OR    COLONOSCOPY      COMBINED CYSTOSCOPY, RETROGRADES, URETEROSCOPY, INSERT STENT Left 8/21/2016    Procedure: COMBINED CYSTOSCOPY, RETROGRADES, URETEROSCOPY, INSERT STENT;  Surgeon: Artemio Valenzuela MD;  Location: [facility] OR    COMBINED CYSTOSCOPY, RETROGRADES, URETEROSCOPY, LASER HOLMIUM LITHOTRIPSY URETER(S), INSERT STENT Left 10/3/2016    Procedure: COMBINED CYSTOSCOPY, RETROGRADES, URETEROSCOPY, LASER HOLMIUM LITHOTRIPSY URETER(S), INSERT STENT;  Surgeon: Artemio Valenzuela MD;  Location: [facility] OR    EXTRACORPOREAL SHOCK WAVE LITHOTRIPSY (ESWL) Left 9/8/2016    Procedure: EXTRACORPOREAL SHOCK WAVE LITHOTRIPSY (ESWL);  Surgeon: Artemio Valenzuela MD;  Location: [facility] OR    INCISION AND DRAINAGE FOOT, COMBINED Right 7/24/2022    Procedure: Incision and drainage, right foot ;  Surgeon: Valentino Molina DPM;  Location: [facility] OR    OSTEOTOMY FOOT Right 11/30/2022    Procedure: 1. Right second metatarsal floating osteotomy  2. Right second digit proximal phalanx arthroplasty 3. Right percutaneous flexor tenotomy;  Surgeon: Tiny Soto DPM;  Location: [facility] OR    OSTEOTOMY FOOT Right 1/19/2023    Procedure: Right foot third  metatarsal head excision, ulcer debridement, matatarsal osteotomies;  Surgeon: Tiny Soto DPM;  Location: [facility] OR    RECESSION GASTROCNEMIUS Right 2/1/2016    Procedure: RECESSION GASTROCNEMIUS;  Surgeon: Rachelle Manriquez DPM, Pod;  Location: [facility] OR       Allergies:    Allergies   Allergen Reactions    Statins Swelling     Other reaction(s): Other (see comments)  SIMCOR caused edema in extremities      Bactrim [Sulfamethoxazole-Trimethoprim] Nausea and Vomiting    Sulfa Antibiotics Nausea    Niacin Swelling     SIMCOR caused edema in extremities    Simvastatin Swelling     SIMCOR caused edema in extremities       Medications:   Current Outpatient Medications   Medication    amoxicillin-clavulanate (AUGMENTIN) 875-125 MG tablet    ciprofloxacin (CIPRO) 500 MG tablet    amLODIPine (NORVASC) 5 MG tablet    atorvastatin (LIPITOR) 40 MG tablet    blood glucose (NO BRAND SPECIFIED) lancets standard    calcium carbonate (OS-NARA) 500 MG tablet    Cholecalciferol (VITAMIN D3) 25 MCG (1000 UT) CAPS    Co-Enzyme Q10 100 MG CAPS    Continuous Blood Gluc Sensor (TempoIQYLE LUCERO 14 DAY SENSOR) MISC    ELIQUIS ANTICOAGULANT 5 MG tablet    gabapentin (NEURONTIN) 100 MG capsule    hydrochlorothiazide (MICROZIDE) 12.5 MG capsule    Insulin Aspart FlexPen 100 UNIT/ML SOPN    insulin degludec (TRESIBA FLEXTOUCH) 100 UNIT/ML pen    insulin pen needle (B-D U/F) 31G X 5 MM miscellaneous    ketoconazole (NIZORAL) 2 % external shampoo    lisinopril (ZESTRIL) 40 MG tablet    MAGNESIUM GLYCINATE PLUS PO    metFORMIN (GLUCOPHAGE XR) 500 MG 24 hr tablet    Multiple Vitamins-Minerals (MULTIVITAMIN PO)    mupirocin (BACTROBAN) 2 % external ointment    Omega-3 Fatty Acids (OMEGA-3 FISH OIL PO)    omeprazole (PRILOSEC) 40 MG DR capsule    primidone (MYSOLINE) 50 MG tablet    propranolol (INDERAL) 20 MG tablet    Semaglutide, 1 MG/DOSE, (OZEMPIC, 1 MG/DOSE,) 4 MG/3ML pen    tadalafil (CIALIS) 5 MG tablet     No current facility-administered  medications for this encounter.         Objective:  Vitals:  /68 (BP Location: Left arm, Patient Position: Chair, Cuff Size: Adult Regular)   Pulse 63   Temp 97.7  F (36.5  C) (Temporal)   Resp 20     A1C: 6.0 on 7/10/23     General:  Patient is alert and orientated.  NAD      Dermatologic: Right foot plantar ulcer: closed and healed   Left foot submet 2 ulcer: probes to bone. No purulence or cellulitis. Some amount of drainage. No cellulitis to foot. Dorsal third digit ulcer improved. Ankle mildly edematous.    .   Wound (used by OP I only) 09/25/23 1505 Left dorsal 3 toe diabetic ulcer (Active)   Thickness/Stage full thickness 10/16/23 1555   Base scab 10/16/23 1555   Periwound intact 10/16/23 1555   Periwound Temperature warm 10/16/23 1555   Periwound Skin Turgor soft 10/16/23 1555   Edges callused;rolled/closed 10/16/23 1555   Length (cm) 0.4 09/25/23 1500   Width (cm) 0.3 09/25/23 1500   Depth (cm) 0.2 09/25/23 1500   Wound (cm^2) 0.12 cm^2 09/25/23 1500   Wound Volume (cm^3) 0.02 cm^3 09/25/23 1500   Drainage Characteristics/Odor serosanguineous 09/25/23 1500   Drainage Amount none 10/16/23 1555   Care, Wound debrided 09/25/23 1500       Wound (used by [facility] only) 09/25/23 1505 Left plantar foot diabetic ulcer (Active)   Thickness/Stage full thickness 10/16/23 1555   Base pink;red;exposed structure 10/16/23 1555   Periwound macerated 10/16/23 1555   Periwound Temperature warm 10/16/23 1555   Periwound Skin Turgor soft 10/16/23 1555   Edges callused 10/16/23 1555   Length (cm) 0.9 10/16/23 1555   Width (cm) 0.7 10/16/23 1555   Depth (cm) 0.6 10/16/23 1555   Wound (cm^2) 0.63 cm^2 10/16/23 1555   Wound Volume (cm^3) 0.38 cm^3 10/16/23 1555   Wound healing % -10.53 10/16/23 1555   Drainage Characteristics/Odor serosanguineous 10/16/23 1555   Drainage Amount moderate 10/16/23 1555   Care, Wound cleansed with 10/16/23 1555       Incision/Surgical Site 07/28/22 Right Foot (Active)       Incision/Surgical  Site 11/30/22 Anterior;Right Toe (Comment  which one) (Active)       Incision/Surgical Site 01/19/23 Right Toe (Comment  which one) (Active)          Vascular: DP & PT pulses are intact & regular bilaterally.  No significant edema or varicosities noted.  CFT and skin temperature is normal to both lower extremities.     Neurologic: Lower extremity sensation is absent      Musculoskeletal: Patient is ambulatory without assistive device or brace s/p several b/l ulcers including right first ray resection and left foot hallux and second digit amputation.     Imaging:    Left foot XR 9/13      IMPRESSION: Partial amputation of the first and second toes at the MTP  joints. No fracture or osteomyelitis. No degenerative or erosive  arthritic change. Hammer toe deformities.     Cultures:  None recent     Assessment:   1. S/p right foot third head resection, fourth metatarsal floating osteotomy, ulcer debridement  --> being treated for residual osteomyelitis, 1/19/23               -Site now open --> slightly smaller today --> remains closed   2. Left foot submet three ulcer               -Probes to bone today   3. Left foot dorsal third digit ulcer --> healed     Plan:    -Discussed all findings with patient. Chart and imaging reviewed.   -Foot overall with some improvement. Less drainage to site and overall erythema   -Ulcer still does probe to bone though. Recommendation for MRI with likely plan for surgery, pending results. He's apprehensive about this, stating he has a lot of travel plans coming up. Discussed with him importance of preventing worsening infection, which he agrees.   -Ideally, MRI to be done this week with possible surgery within the next 1-2 weeks  -Cipro rx written, per previous cultures which showed pseudomonas. Discussed risks of this and need to limit activity. Should use knee scooter at all times for left foot.   -Debridement done as discussed below.   -Will call patient with results and plan  accordingly.     Procedure:  After verbal consent, per protocol lidocaine was applied to the wound. Excisional debridement was performed on ulcer.   #15 blade was used to debride ulcer down to and including subcutaneous tissue. Bleeding controlled with light pressure.  No drainage noted.   < 20sq cm debrided.  Dry dressing applied to foot.  Patient tolerated procedure well.    Tiny Soto DPM    Greater than 30 minutes were spent today, reviewing previous hospital records, counseling and educating the patient on the ongoing treatment plan and coordinating care.                         Further instructions from your care team         Crispin Rojas      1960    A DME order for supplies has been placed to [facility], [address]. If there are any issues with your order including not receiving the order please call [facility] at [phone] option 1. They can also provide a tracking number for you if you had supplies shipped to you.    Dressing changes outside of clinic are being performed by Patient    PLAN:  Antibiotics. Please take with food. Take probiotics 2 hours before or 2 hours after antibiotics.   - Continue to take your Augmentin   - Ordered New Rx for Ciprofloxacin for 7 days.  Please call the scheduling  to schedule your Left Foot MRI appointment at:  [facility]: [phone]  [facility] imaging for all locations: [phone]  [facility]: [phone]  Suburb on 1st floor 65 [phone]  Please call [facility] [employer] [phone] to get new shoes/orthotics.    Wound Dressing Change: left plantar foot  - Wash your hands with soap and water before you begin your dressing change and prepare a clean surface for dressings.  - Cleanse with mild unscented soap and water (such as Cetaphil, Cerave or Dove)   - Apply thick, high-quality moisturizer to intact skin (such as CeraVe, Eucerin, Aquaphor or Vanicream)   -  Pack with 1/6 Promogran Ludivina Ag 4x4  - Cover/pack top aspect of wound with 1/10 Hydrofera Blue Ready-Transfer 4x5, cut-to-fit size of wound  - Cover with Zetuvit plus silicone border 4x4  - Apply Spandigrip size E from toes to ankle and size F from ankle to knee  Change daily     Wound Dressing Change: dorsal left toe 2  - Wash your hands with soap and water before you begin your dressing change and prepare a clean surface for dressings.  - Cleanse with mild unscented soap and water (such as Cetaphil, Cerave or Dove)   - Apply thick, high-quality moisturizer to intact skin (such as CeraVe, Eucerin, Aquaphor or Vanicream)   Change daily     Compression:   Your compression is Spandigrip and can be removed at night and put back on first thing in the morning.   Please remove compression dressing if toes turn blue and/or tingle and can not be relieved by raising the leg for one hour. If unable to reapply in the morning, keep compression on until next dressing change.     Limit walking/weight bearing as much and possible. Use AirCast boot with knee roller. Continue shoes with diabetic inserts until you get your new ones from [employer].     Whenever you sit raise your ankle above your hips to promote wound healing.       Keep blood sugars below 150 and A1C below 7           GONZALO Rios.P.M. October 16, 2023    Call us at [phone] if you have any questions about your wounds, have redness or swelling around your wound, have a fever of 101 degrees Fahrenheit or greater or if you have any other problems or concerns. We answer the phone Monday through Friday 8 am to 4 pm, please leave a message as we check the voicemail frequently throughout the day.     If you had a positive experience please indicate that on your patient satisfaction survey form that [facility] will be sending you.    It was a pleasure meeting with you today.  Thank you for allowing me and my team the privilege of caring for you today.  YOU  are the reason we are here, and I truly hope we provided you with the excellent service you deserve.  Please let us know if there is anything else we can do for you so that we can be sure you are leaving completely satisfied with your care experience.      If you have any billing related questions please call the [facility] Business office at [phone]. The clinic staff does not handle billing related matters.    If you are scheduled to have a follow up appointment, you will receive a reminder call the day before your visit. On the appointment day please arrive 15 minutes prior to your appointment time. If you are unable to keep that appointment, please call the clinic to cancel or reschedule. If you are more than 10 minutes late or greater for your scheduled appointment time, the clinic policy is that you may be asked to reschedule.

## 2023-10-18 NOTE — COMMUNITY RESOURCES LIST (ENGLISH)
10/18/2023   [facility] [employer]  N/A  For questions about this resource list or additional care needs, please contact your primary care clinic or care manager.  Phone: [phone]   Email: N/A   Address: [address] [place] [address]   Hours: N/A        Exercise and Recreation       Gym or workout facility  1  [employer] - [place] Distance: 4.36 miles      In-Person   [address] [place] [address]  Language: English  Hours: Mon - Sun Open 24 Hours  Fees: Insurance, Self Pay, Sliding Fee   Phone: [phone] Email: [email or web address] Website: [email or web address]     2  [facility] - [place] - Kickboxing Classes Distance: 5.56 miles      In-Person   [address] [place] [address]  Language: English  Hours: Mon - Fri 6:00 AM - 12:30 PM , Mon - Fri 3:00 PM - 8:00 PM , Sat 8:00 AM - 12:00 PM  Fees: Self Pay   Phone: [phone] Website: [email or web address]          Important Numbers & Websites       Emergency Services   911  [facility] Services   311  Poison Control   (635) 247-8131  Suicide Prevention Lifeline   (705) 523-6214 (TALK)  Child Abuse Hotline   (258) 327-9881 (4-A-Child)  Sexual Assault Hotline   (353) 112-5520 (HOPE)  National Runaway Safeline   (647) 610-3882 (RUNAWAY)  All-Options Talkline   (336) 125-3133  Substance Abuse Referral   (645) 248-1015 (HELP)

## 2023-10-18 NOTE — PROGRESS NOTES
Podiatry Progress Note - Update    -Patient seen yesterday for worsening left foot ulcer, given depth to level of bone, an MRI was ordered.   -Notification received this afternoon that results were available. Reviewed images and report which show osteomyelitis to the second metatarsal. Call placed to patient to discuss further.   -Given MRI findings and long standing ulceration, recommendation made for resection of second metatarsal and debridement of plantar ulcer with closure. Discussed risks and benefits of procedure.   -Patient plans to travel out of town on 10/27. Discussed with him risks and this and primarily that he must use knee scooter and limit activity as much as possible up until then. He states he understands and agrees.   -Patient otherwise feels fine, without fevers, chills or signs of systemic illness. We'll avoid admission at this time. He's been prescribed abx to continue.   -Order placed for surgery for Thursday or Friday this week. Discussed with him to stop eliquis 24 hours beforehand. So if surgery is scheduled for Thursday, last eliquis dose should be Wednesday morning.   -All questions answered. Further follow up following procedure.       Tiny Soto DPM     Total time spent on the phone was 14 minutes. Time spent reviewing MRI, medications and cultures in addition to this totalled over 30 minutes.

## 2023-10-18 NOTE — PATIENT INSTRUCTIONS
Additional Medication Instructions:   - ACE/ARB: HOLD on day of surgery (minimum 11 hours for general anesthesia).   - Beta Blockers: Continue taking on the day of surgery.   - Calcium Channel Blockers: May be continued on the day of surgery.   - Diuretics: HOLD on the day of surgery.   - Long acting insulin (e.g. glargine, detemir): Take 80% of the usual evening or morning dose before surgery.    - short acting insulin (e.g. regular, lispro, aspart): HOLD on the morning of surgery.    - metformin: HOLD day of surgery.   - GLP-1 Injectable (exenitide, liraglutide, semaglutide, dulaglutide, etc.): HOLD 7 days before surgery

## 2023-10-18 NOTE — COMMUNITY RESOURCES LIST (ENGLISH)
10/18/2023   [facility] [employer]  N/A  For questions about this resource list or additional care needs, please contact your primary care clinic or care manager.  Phone: [phone]   Email: N/A   Address: [address] [place] [address]   Hours: N/A        Exercise and Recreation       Gym or workout facility  1  [employer] - [place] Distance: 4.36 miles      In-Person   [address] [place] [address]  Language: English  Hours: Mon - Sun Open 24 Hours  Fees: Insurance, Self Pay, Sliding Fee   Phone: [phone] Email: [email or web address] Website: [email or web address]     2  [facility] [place] - Kick[facility]ing Classes Distance: 5.56 miles      In-Person   [address] [place] [address]  Language: English  Hours: Mon - Fri 6:00 AM - 12:30 PM , Mon - Fri 3:00 PM - 8:00 PM , Sat 8:00 AM - 12:00 PM  Fees: Self Pay   Phone: (124) 706-2334 Website: [email or web address]          Important Numbers & Websites       Emergency Services   911  [facility] Services   311  Poison Control   (858) 207-2880  Suicide Prevention Lifeline   (611) 410-5391 (TALK)  Child Abuse Hotline   (735) 115-3191 (4-A-Child)  Sexual Assault Hotline   (137) 340-1369 (HOPE)  National Runaway Safeline   (960) 397-6310 (RUNAWAY)  All-Options Talkline   (156) 777-2318  Substance Abuse Referral   (993) 770-1823 (HELP)

## 2023-10-18 NOTE — COMMUNITY RESOURCES LIST (ENGLISH)
10/18/2023   [facility] Outpatient [facility]ics  N/A  For additional resource needs, please contact your health insurance member services or your primary care team.  Phone: [phone]   Email: N/A   Address: [address] [place] [address]   Hours: N/A        Exercise and Recreation       Gym or workout facility  1  [employer] - [place] Distance: 4.36 miles      In-Person   [address] [place] [address]  Language: English  Hours: Mon - Sun Open 24 Hours  Fees: Insurance, Self Pay, Sliding Fee   Phone: ([phone]) [phone] Email: [email or web address] Website: [email or web address]     2  [facility] [place] - Kick[employer]ing Classes Distance: 5.56 miles      In-Person   [address] [place] [address]  Language: English  Hours: Mon - Fri 6:00 AM - 12:30 PM , Mon - Fri 3:00 PM - 8:00 PM , Sat 8:00 AM - 12:00 PM  Fees: Self Pay   Phone: (261) 157-7198 Website: [email or web address]          Important Numbers & Websites       [address].org  Poison Control   (601) 710-2163 [place]poison.[place]  Suicide and Crisis Lifeline   [address] 98[facility] Center[facility].org  Childhelp National Child Abuse Hotline   852.573.6432 Childhelphotline.org  National Sexual Assault Hotline   (953) 668-2266 (HOPE) Rainn.org  National Runaway Safeline   (879) 147-5054 (RUNAWAY) [facility] Satelliterunaway.org  Pregnancy & Postpartum Support [place]   Call/text 738-763-2892 Ppsupportmn.org  Substance Abuse National Helpline ([facility]   281-554-HELP (1719) Findtreatment.gov  Emergency Services   911

## 2023-10-19 ENCOUNTER — HOSPITAL ENCOUNTER (OUTPATIENT)
Facility: CLINIC | Age: 63
Discharge: HOME | End: 2023-10-19
Attending: PODIATRIST | Admitting: PODIATRIST
Payer: COMMERCIAL

## 2023-10-19 ENCOUNTER — ANESTHESIA EVENT (OUTPATIENT)
Dept: SURGERY | Facility: CLINIC | Age: 63
End: 2023-10-19
Payer: COMMERCIAL

## 2023-10-19 ENCOUNTER — ANESTHESIA (OUTPATIENT)
Dept: SURGERY | Facility: CLINIC | Age: 63
End: 2023-10-19
Payer: COMMERCIAL

## 2023-10-19 ENCOUNTER — APPOINTMENT (OUTPATIENT)
Dept: GENERAL RADIOLOGY | Facility: CLINIC | Age: 63
End: 2023-10-19
Attending: PODIATRIST
Payer: COMMERCIAL

## 2023-10-19 VITALS
HEIGHT: 71 IN | SYSTOLIC BLOOD PRESSURE: 125 MMHG | WEIGHT: 229.7 LBS | OXYGEN SATURATION: 96 % | TEMPERATURE: 97 F | HEART RATE: 50 BPM | RESPIRATION RATE: 15 BRPM | DIASTOLIC BLOOD PRESSURE: 72 MMHG | BODY MASS INDEX: 32.16 KG/M2

## 2023-10-19 DIAGNOSIS — Z98.890 S/P FOOT SURGERY, LEFT: Primary | ICD-10-CM

## 2023-10-19 LAB
GLUCOSE BLDC GLUCOMTR-MCNC: 106 MG/DL (ref 70–99)
GLUCOSE BLDC GLUCOMTR-MCNC: 149 MG/DL (ref 70–99)
GRAM STAIN RESULT: NORMAL

## 2023-10-19 PROCEDURE — 999N000065 XR FOOT PORT LEFT 3 VIEWS: Mod: LT

## 2023-10-19 PROCEDURE — 250N000011 HC RX IP 250 OP 636: Performed by: PODIATRIST

## 2023-10-19 PROCEDURE — 360N000083 HC SURGERY LEVEL 3 W/ FLUORO, PER MIN: Performed by: PODIATRIST

## 2023-10-19 PROCEDURE — 82962 GLUCOSE BLOOD TEST: CPT

## 2023-10-19 PROCEDURE — 370N000017 HC ANESTHESIA TECHNICAL FEE, PER MIN: Performed by: PODIATRIST

## 2023-10-19 PROCEDURE — 258N000003 HC RX IP 258 OP 636: Performed by: ANESTHESIOLOGY

## 2023-10-19 PROCEDURE — 272N000001 HC OR GENERAL SUPPLY STERILE: Performed by: PODIATRIST

## 2023-10-19 PROCEDURE — 710N000012 HC RECOVERY PHASE 2, PER MINUTE: Performed by: PODIATRIST

## 2023-10-19 PROCEDURE — 87077 CULTURE AEROBIC IDENTIFY: CPT | Mod: 59 | Performed by: PODIATRIST

## 2023-10-19 PROCEDURE — 250N000011 HC RX IP 250 OP 636: Performed by: NURSE ANESTHETIST, CERTIFIED REGISTERED

## 2023-10-19 PROCEDURE — 87077 CULTURE AEROBIC IDENTIFY: CPT | Performed by: PODIATRIST

## 2023-10-19 PROCEDURE — 88305 TISSUE EXAM BY PATHOLOGIST: CPT | Mod: 26 | Performed by: PATHOLOGY

## 2023-10-19 PROCEDURE — 11043 DBRDMT MUSC&/FSCA 1ST 20/<: CPT | Mod: 51 | Performed by: PODIATRIST

## 2023-10-19 PROCEDURE — 999N000141 HC STATISTIC PRE-PROCEDURE NURSING ASSESSMENT: Performed by: PODIATRIST

## 2023-10-19 PROCEDURE — 87205 SMEAR GRAM STAIN: CPT | Performed by: PODIATRIST

## 2023-10-19 PROCEDURE — 87070 CULTURE OTHR SPECIMN AEROBIC: CPT | Performed by: PODIATRIST

## 2023-10-19 PROCEDURE — 28122 PARTIAL REMOVAL OF FOOT BONE: CPT | Mod: LT | Performed by: PODIATRIST

## 2023-10-19 PROCEDURE — 88311 DECALCIFY TISSUE: CPT | Mod: 26 | Performed by: PATHOLOGY

## 2023-10-19 PROCEDURE — 88311 DECALCIFY TISSUE: CPT | Mod: TC,59 | Performed by: PODIATRIST

## 2023-10-19 RX ORDER — ONDANSETRON 2 MG/ML
INJECTION INTRAMUSCULAR; INTRAVENOUS PRN
Status: DISCONTINUED | OUTPATIENT
Start: 2023-10-19 | End: 2023-10-19

## 2023-10-19 RX ORDER — FENTANYL CITRATE 50 UG/ML
25 INJECTION, SOLUTION INTRAMUSCULAR; INTRAVENOUS EVERY 5 MIN PRN
Status: DISCONTINUED | OUTPATIENT
Start: 2023-10-19 | End: 2023-10-19 | Stop reason: HOSPADM

## 2023-10-19 RX ORDER — ONDANSETRON 2 MG/ML
4 INJECTION INTRAMUSCULAR; INTRAVENOUS EVERY 30 MIN PRN
Status: DISCONTINUED | OUTPATIENT
Start: 2023-10-19 | End: 2023-10-19 | Stop reason: HOSPADM

## 2023-10-19 RX ORDER — FENTANYL CITRATE 50 UG/ML
INJECTION, SOLUTION INTRAMUSCULAR; INTRAVENOUS PRN
Status: DISCONTINUED | OUTPATIENT
Start: 2023-10-19 | End: 2023-10-19

## 2023-10-19 RX ORDER — OXYCODONE HYDROCHLORIDE 5 MG/1
10 TABLET ORAL
Status: DISCONTINUED | OUTPATIENT
Start: 2023-10-19 | End: 2023-10-19 | Stop reason: HOSPADM

## 2023-10-19 RX ORDER — OXYCODONE AND ACETAMINOPHEN 5; 325 MG/1; MG/1
1 TABLET ORAL
Status: DISCONTINUED | OUTPATIENT
Start: 2023-10-19 | End: 2023-10-19 | Stop reason: HOSPADM

## 2023-10-19 RX ORDER — NALOXONE HYDROCHLORIDE 0.4 MG/ML
0.2 INJECTION, SOLUTION INTRAMUSCULAR; INTRAVENOUS; SUBCUTANEOUS
Status: DISCONTINUED | OUTPATIENT
Start: 2023-10-19 | End: 2023-10-19 | Stop reason: HOSPADM

## 2023-10-19 RX ORDER — OXYCODONE AND ACETAMINOPHEN 5; 325 MG/1; MG/1
1 TABLET ORAL EVERY 8 HOURS PRN
Qty: 12 TABLET | Refills: 0 | Status: SHIPPED | OUTPATIENT
Start: 2023-10-19 | End: 2023-10-23

## 2023-10-19 RX ORDER — ACETAMINOPHEN 325 MG/1
975 TABLET ORAL ONCE
Status: DISCONTINUED | OUTPATIENT
Start: 2023-10-19 | End: 2023-10-19 | Stop reason: HOSPADM

## 2023-10-19 RX ORDER — CEFAZOLIN SODIUM/WATER 2 G/20 ML
2 SYRINGE (ML) INTRAVENOUS
Status: COMPLETED | OUTPATIENT
Start: 2023-10-19 | End: 2023-10-19

## 2023-10-19 RX ORDER — NALOXONE HYDROCHLORIDE 0.4 MG/ML
0.4 INJECTION, SOLUTION INTRAMUSCULAR; INTRAVENOUS; SUBCUTANEOUS
Status: DISCONTINUED | OUTPATIENT
Start: 2023-10-19 | End: 2023-10-19 | Stop reason: HOSPADM

## 2023-10-19 RX ORDER — PROPOFOL 10 MG/ML
INJECTION, EMULSION INTRAVENOUS CONTINUOUS PRN
Status: DISCONTINUED | OUTPATIENT
Start: 2023-10-19 | End: 2023-10-19

## 2023-10-19 RX ORDER — LABETALOL HYDROCHLORIDE 5 MG/ML
10 INJECTION, SOLUTION INTRAVENOUS EVERY 5 MIN PRN
Status: DISCONTINUED | OUTPATIENT
Start: 2023-10-19 | End: 2023-10-19 | Stop reason: HOSPADM

## 2023-10-19 RX ORDER — LIDOCAINE 40 MG/G
CREAM TOPICAL
Status: DISCONTINUED | OUTPATIENT
Start: 2023-10-19 | End: 2023-10-19 | Stop reason: HOSPADM

## 2023-10-19 RX ORDER — OXYCODONE HYDROCHLORIDE 5 MG/1
5 TABLET ORAL
Status: DISCONTINUED | OUTPATIENT
Start: 2023-10-19 | End: 2023-10-19 | Stop reason: HOSPADM

## 2023-10-19 RX ORDER — SODIUM CHLORIDE, SODIUM LACTATE, POTASSIUM CHLORIDE, CALCIUM CHLORIDE 600; 310; 30; 20 MG/100ML; MG/100ML; MG/100ML; MG/100ML
INJECTION, SOLUTION INTRAVENOUS CONTINUOUS
Status: DISCONTINUED | OUTPATIENT
Start: 2023-10-19 | End: 2023-10-19 | Stop reason: HOSPADM

## 2023-10-19 RX ORDER — BUPIVACAINE HYDROCHLORIDE 5 MG/ML
INJECTION, SOLUTION EPIDURAL; INTRACAUDAL PRN
Status: DISCONTINUED | OUTPATIENT
Start: 2023-10-19 | End: 2023-10-19 | Stop reason: HOSPADM

## 2023-10-19 RX ORDER — FENTANYL CITRATE 50 UG/ML
50 INJECTION, SOLUTION INTRAMUSCULAR; INTRAVENOUS EVERY 5 MIN PRN
Status: DISCONTINUED | OUTPATIENT
Start: 2023-10-19 | End: 2023-10-19 | Stop reason: HOSPADM

## 2023-10-19 RX ORDER — ONDANSETRON 4 MG/1
4 TABLET, ORALLY DISINTEGRATING ORAL EVERY 30 MIN PRN
Status: DISCONTINUED | OUTPATIENT
Start: 2023-10-19 | End: 2023-10-19 | Stop reason: HOSPADM

## 2023-10-19 RX ORDER — KETOROLAC TROMETHAMINE 15 MG/ML
15 INJECTION, SOLUTION INTRAMUSCULAR; INTRAVENOUS
Status: DISCONTINUED | OUTPATIENT
Start: 2023-10-19 | End: 2023-10-19 | Stop reason: HOSPADM

## 2023-10-19 RX ORDER — CEFAZOLIN SODIUM/WATER 2 G/20 ML
2 SYRINGE (ML) INTRAVENOUS SEE ADMIN INSTRUCTIONS
Status: DISCONTINUED | OUTPATIENT
Start: 2023-10-19 | End: 2023-10-19 | Stop reason: HOSPADM

## 2023-10-19 RX ADMIN — Medication 2 G: at 11:31

## 2023-10-19 RX ADMIN — PROPOFOL 100 MCG/KG/MIN: 10 INJECTION, EMULSION INTRAVENOUS at 11:38

## 2023-10-19 RX ADMIN — SODIUM CHLORIDE, POTASSIUM CHLORIDE, SODIUM LACTATE AND CALCIUM CHLORIDE: 600; 310; 30; 20 INJECTION, SOLUTION INTRAVENOUS at 11:31

## 2023-10-19 RX ADMIN — MIDAZOLAM 2 MG: 1 INJECTION INTRAMUSCULAR; INTRAVENOUS at 11:31

## 2023-10-19 RX ADMIN — ONDANSETRON 4 MG: 2 INJECTION INTRAMUSCULAR; INTRAVENOUS at 11:40

## 2023-10-19 RX ADMIN — FENTANYL CITRATE 50 MCG: 50 INJECTION INTRAMUSCULAR; INTRAVENOUS at 11:37

## 2023-10-19 RX ADMIN — FENTANYL CITRATE 50 MCG: 50 INJECTION INTRAMUSCULAR; INTRAVENOUS at 11:39

## 2023-10-19 ASSESSMENT — ACTIVITIES OF DAILY LIVING (ADL)
ADLS_ACUITY_SCORE: 36

## 2023-10-19 NOTE — OP NOTE
PREOPERATIVE DIAGNOSES:     1.  Left second metatarsal osteomyelitis   2.  Left plantar foot ulcer       POSTOPERATIVE DIAGNOSES:   1.  Left second metatarsal osteomyelitis   2.  Left plantar foot ulcer        PROCEDURE:     1. Left foot second metatarsal resection    2. Left plantar ulcer excisional debridement, down to and including tendon, with closure, site measuring 4 cm x 2 cm x 1 cm          ANESTHESIA:  MAC with local.       HEMOSTASIS:  Electrocautery.       ESTIMATED BLOOD LOSS:  20 mL.       SPECIMENS:  Soft tissue culture, second metatarsal for pathology, proximal second metatarsal, proximal bone culture of second metatarsal      MATERIALS:  None    Indications: Crispin Rojas has an ulcer and active infection to the left foot, submet 2 site. He had an MRI done which showed osteomyeltiis to the second metatarsal and has had an ulcer with palpable bone to this side for several weeks. Given this, recommendation was made to excise the infected bone and debride and close the site. Procedure was discussed with patient at length, including all risks and benefits. Patient agrees to planned procedure.     Procedure: Under mild sedation pt was brought into the OR & placed on the operating table in a supine position. An ankle tourniquet was applied and inflated to 250 mmHg for a total of 14 minutes. A total of 20 cc of .5% marcaine were injected into the second metatarsal site.     The foot was scrubbed, prepped and draped in an aseptic manner.  An incision was made along the second metatarsal, excising all necrotic and infection tissue. The freer elevator was then used to reflect all soft tissue. The sagittal saw was then used to cut through the metatarsal, starting dorsally and moving plantarly. This was done at the mid-distal shaft region. This was continued in through and through cuts and the metatarsal head was sharply excised and sent to pathology. All bleeders were ligated and cauterized as necessary.  Proximal pieces were taken for pathology and culture.      All resected bone was sent to pathology.  All nonviable soft tissue was resected  insuring no necrotic tissue proximal to the amputation remained. Next copious amounts of saline were used to flush the amputation site. Site was then closed with 3.0 prolene suture.    Following this, the plantar procedure was done. The ulcer was cut and removed from the plantar foot. The tissue was debrided including plantar capsule and tendon of all nonviable tissue. The soft tissue defect was  4 cm x 2 cm x 1 cm. The site was then irrigated with copious amounts of saline and then closed with 2-0 prolene suture.    The pt tolerated the procedure & anesthesia well.  He was transferred to the recovery room with vital signs stable and vascular status intact to the left foot.  Following a period of post-op monitoring the pt will return to his hospital bed with the following written and oral post-op instructions:    Keep dressing clean, dry and intact.  Do not remove dressing.  NWB to LLE  Elevate foot at rest.  Continue IV antibiotics  Contact Dr. Soto if any post-op questions or arrise.     Intraop findings: Proximal second metatarsal appeared viable. Distal piece fragmented and grey in color. No hayde purulence. Adequate bleeding for procedure.

## 2023-10-19 NOTE — ANESTHESIA PREPROCEDURE EVALUATION
Anesthesia Pre-Procedure Evaluation    Patient: Crispin Rojas   MRN: [id number] : 1960        Procedure : Procedure(s):  Left foot second metatarsal resection, left plantar foot debridement          Past Medical History:   Diagnosis Date    Antiplatelet or antithrombotic long-term use     Ascending aorta dilatation (H24)     Cerebral artery occlusion with cerebral infarction (H)     Cerebral infarction (H)         Gastroesophageal reflux disease with esophagitis     H/O blood clots     Hyperlipidemia LDL goal <70     Hypertension     Nonalcoholic steatohepatitis 2022    Numbness and tingling     Obesity     GILA (obstructive sleep apnea) 10/22/2018    CPAP intolerant    PVD (peripheral vascular disease) (H24)     s/p left partial toe amputation    Renal stone     Tremor     Type 2 diabetes mellitus with diabetic polyneuropathy, with long-term current use of insulin (H)       Past Surgical History:   Procedure Laterality Date    AMPUTATE FOOT Left 2016    Procedure: AMPUTATE FOOT;  Surgeon: Jack Younger DPM;  Location: [facility]    AMPUTATE TOE(S) Right 2016    Procedure: AMPUTATE TOE(S);  Surgeon: Rachelle Manriquez DPM, Pod;  Location: [facility]    AMPUTATE TOE(S) Right 2019    Procedure: Right fourth toe partial amputation for treatment of osteomyelitis.;  Surgeon: Jack Younger DPM;  Location: [facility]    AMPUTATE TOE(S) Left 2019    Procedure: Left second toe amputation at the metatarsophalangeal joint.;  Surgeon: Rachelle Manriquez DPM, Podiatry/Foot and Ankle Surgery;  Location: [facility]    AMPUTATE TOE(S) Right 2022    Procedure: AMPUTATION OF RIGHT GREAT TOE;  Surgeon: Wili Quiles DPM;  Location: [facility]    AMPUTATE TOE(S) Right 2022    Procedure: Right foot partial first metatarsal resection;  Surgeon: Tiny Soto DPM;  Location: [facility]    ANGIOGRAM      BIOPSY BONE FOOT Right 2022    Procedure: Bone biopsy, right first metatarsal.;  Surgeon:  Valentino Molina DPM;  Location: [facility]    COLONOSCOPY      COMBINED CYSTOSCOPY, RETROGRADES, URETEROSCOPY, INSERT STENT Left 8/21/2016    Procedure: COMBINED CYSTOSCOPY, RETROGRADES, URETEROSCOPY, INSERT STENT;  Surgeon: Artemio Valenzuela MD;  Location: [facility]    COMBINED CYSTOSCOPY, RETROGRADES, URETEROSCOPY, LASER HOLMIUM LITHOTRIPSY URETER(S), INSERT STENT Left 10/3/2016    Procedure: COMBINED CYSTOSCOPY, RETROGRADES, URETEROSCOPY, LASER HOLMIUM LITHOTRIPSY URETER(S), INSERT STENT;  Surgeon: Artemio Valenzuela MD;  Location: [facility]    EXTRACORPOREAL SHOCK WAVE LITHOTRIPSY (ESWL) Left 9/8/2016    Procedure: EXTRACORPOREAL SHOCK WAVE LITHOTRIPSY (ESWL);  Surgeon: Artemio Valenzuela MD;  Location: [facility]    INCISION AND DRAINAGE FOOT, COMBINED Right 7/24/2022    Procedure: Incision and drainage, right foot ;  Surgeon: Valentino Molina DPM;  Location: [facility]    OSTEOTOMY FOOT Right 11/30/2022    Procedure: 1. Right second metatarsal floating osteotomy  2. Right second digit proximal phalanx arthroplasty 3. Right percutaneous flexor tenotomy;  Surgeon: Tiny Soto DPM;  Location: [facility]    OSTEOTOMY FOOT Right 1/19/2023    Procedure: Right foot third metatarsal head excision, ulcer debridement, matatarsal osteotomies;  Surgeon: Tiny Soto DPM;  Location: [facility]    RECESSION GASTROCNEMIUS Right 2/1/2016    Procedure: RECESSION GASTROCNEMIUS;  Surgeon: Rachelle Manriquez DPM, Pod;  Location: [facility]      Allergies   Allergen Reactions    Statins Swelling     Other reaction(s): Other (see comments)  SIMCOR caused edema in extremities      Bactrim [Sulfamethoxazole-Trimethoprim] Nausea and Vomiting    Sulfa Antibiotics Nausea    Niacin Swelling     SIMCOR caused edema in extremities    Simvastatin Swelling     SIMCOR caused edema in extremities      Social History     Tobacco Use    Smoking status: Never    Smokeless tobacco: Never   Substance Use Topics    Alcohol use: Yes     Comment:  rare---red wine 3x per month      Wt Readings from Last 1 Encounters:   10/19/23 104.2 kg (229 lb 11.2 oz)        Anesthesia Evaluation            ROS/MED HX  ENT/Pulmonary:     (+) sleep apnea, doesn't use CPAP,                                     Neurologic:  - neg neurologic ROS     Cardiovascular:     (+)  hypertension- -   -  - -   Taking blood thinners Pt has received instructions:                                  METS/Exercise Tolerance: >4 METS    Hematologic:  - neg hematologic  ROS     Musculoskeletal:  - neg musculoskeletal ROS     GI/Hepatic:  - neg GI/hepatic ROS     Renal/Genitourinary:  - neg Renal ROS     Endo:     (+)  type II DM,   Using insulin,          Obesity,       Psychiatric/Substance Use:  - neg psychiatric ROS     Infectious Disease:  - neg infectious disease ROS     Malignancy:  - neg malignancy ROS     Other:  - neg other ROS          Physical Exam    Airway        Mallampati: II   TM distance: > 3 FB   Neck ROM: full   Mouth opening: > 3 cm    Respiratory Devices and Support         Dental       (+) Completely normal teeth      Cardiovascular   cardiovascular exam normal       Rhythm and rate: regular and normal     Pulmonary   pulmonary exam normal        breath sounds clear to auscultation           OUTSIDE LABS:  CBC:   Lab Results   Component Value Date    WBC 4.5 07/10/2023    WBC 4.0 03/10/2023    HGB 11.5 (L) 10/18/2023    HGB 11.7 (L) 07/10/2023    HCT 37.5 (L) 07/10/2023    HCT 35.1 (L) 03/10/2023     07/10/2023     03/10/2023     BMP:   Lab Results   Component Value Date     07/10/2023     01/09/2023    POTASSIUM 4.6 07/10/2023    POTASSIUM 4.0 01/19/2023    CHLORIDE 103 07/10/2023    CHLORIDE 106 01/09/2023    CO2 25 07/10/2023    CO2 24 01/09/2023    BUN 17.6 07/10/2023    BUN 18.4 03/02/2023    CR 0.88 07/10/2023    CR 0.86 03/10/2023     (H) 10/19/2023     (H) 07/10/2023     COAGS:   Lab Results   Component Value Date    PTT 39 (H)  07/28/2022    INR 1.08 11/07/2019     POC:   Lab Results   Component Value Date     (H) 05/10/2021     HEPATIC:   Lab Results   Component Value Date    ALBUMIN 4.3 07/10/2023    PROTTOTAL 7.4 07/10/2023    ALT 52 07/10/2023    AST 47 (H) 07/10/2023    ALKPHOS 62 07/10/2023    BILITOTAL 0.2 07/10/2023     OTHER:   Lab Results   Component Value Date    LACT 1.7 07/22/2022    A1C 6.0 (H) 07/10/2023    NARA 9.8 07/10/2023    MAG 2.1 08/02/2019    LIPASE 156 07/22/2022    TSH 1.18 07/23/2022    CRP 70.9 (H) 07/22/2022    SED 21 (H) 07/10/2023       Anesthesia Plan    ASA Status:  3    NPO Status:  NPO Appropriate    Anesthesia Type: MAC.   Induction: Intravenous.   Maintenance: TIVA.        Consents    Anesthesia Plan(s) and associated risks, benefits, and realistic alternatives discussed. Questions answered and patient/representative(s) expressed understanding.     - Discussed: Risks, Benefits and Alternatives for BOTH SEDATION and the PROCEDURE were discussed     - Discussed with:  Patient       Use of blood products discussed: Yes.     - Discussed with: Patient.     - Consented: consented to blood products            Reason for refusal: other.     Postoperative Care    Pain management: IV analgesics, Oral pain medications.   PONV prophylaxis: Ondansetron (or other 5HT-3), Dexamethasone or Solumedrol     Comments:    Other Comments: Appropriately held ozempic, last dose 8 days ago. No GI symptoms (nausea, vomiting, constipation, gastric fullness). Lost 70 lbs through diet and exercise, no longer uses CPAP. Last anticoagulant dose 10/18/23.    Plan: MAC with propofol drip.            Annetta Pineda MD

## 2023-10-19 NOTE — BRIEF OP NOTE
[facility]    Brief Operative Note    Pre-operative diagnosis: Other acute osteomyelitis of left foot (H) [M86.172]  Post-operative diagnosis Same as pre-operative diagnosis    Procedure: 1. Left foot second metatarsal resection     2. Left plantar ulcer excisional debridement, down to and including tendon, with closure, site measuring 4 cm x 2 cm x 1 cm     Surgeon: Surgeon(s) and Role:     * Tiny Soto DPM - Primary  Anesthesia: MAC   Estimated Blood Loss: 20 mL from 10/19/2023 11:34 AM to 10/19/2023 12:42 PM      Drains: None  Specimens:   ID Type Source Tests Collected by Time Destination   1 : 2nd left proximal metatarsal Bone Biopsy Foot, Left SURGICAL PATHOLOGY EXAM Tiny Soto DPM 10/19/2023 12:11 PM    2 : 2nd left metatarsal head Bone Resection Foot, Left SURGICAL PATHOLOGY EXAM Tiny Soto DPM 10/19/2023 12:18 PM    A : Proximal 2nd metatarsal Tissue Foot, Left ANAEROBIC BACTERIAL CULTURE ROUTINE, GRAM STAIN, AEROBIC BACTERIAL CULTURE ROUTINE Tiny Soto DPM 10/19/2023 12:04 PM    B : Soft tissue left foot ulcer Tissue Foot, Left ANAEROBIC BACTERIAL CULTURE ROUTINE, GRAM STAIN, AEROBIC BACTERIAL CULTURE ROUTINE Tiny Soto DPM 10/19/2023 12:22 PM      Findings:  Distal metatarsal fragmented and nonviable. Proximal sites appeared viable   Complications: None.  Implants: * No implants in log *

## 2023-10-19 NOTE — ANESTHESIA CARE TRANSFER NOTE
Patient: Crispin oRjas    Procedure: Procedure(s):  Left foot second metatarsal resection, left plantar foot debridement       Diagnosis: Other acute osteomyelitis of left foot (H) [M86.172]  Diagnosis Additional Information: No value filed.    Anesthesia Type:   MAC     Note:    Oropharynx: oropharynx clear of all foreign objects  Level of Consciousness: awake  Oxygen Supplementation: room air    Independent Airway: airway patency satisfactory and stable  Dentition: dentition unchanged  Vital Signs Stable: post-procedure vital signs reviewed and stable  Report to RN Given: handoff report given  Patient transferred to: Phase II    Handoff Report: Identifed the Patient, Identified the Reponsible Provider, Reviewed the pertinent medical history, Discussed the surgical course, Reviewed Intra-OP anesthesia mangement and issues during anesthesia, Set expectations for post-procedure period and Allowed opportunity for questions and acknowledgement of understanding      Vitals:  Vitals Value Taken Time   /68 10/19/23 1244   Temp     Pulse 62 10/19/23 1244   Resp     SpO2 94 % 10/19/23 1247   Vitals shown include unfiled device data.    Electronically Signed By: SARAH Hinson CRNA  October 19, 2023  12:48 PM

## 2023-10-24 LAB — BACTERIA TISS BX CULT: NO GROWTH

## 2023-10-25 ENCOUNTER — TRANSFERRED RECORDS (OUTPATIENT)
Dept: SURGERY | Facility: CLINIC | Age: 63
End: 2023-10-25
Payer: COMMERCIAL

## 2023-10-25 ENCOUNTER — MYC MEDICAL ADVICE (OUTPATIENT)
Dept: OTOLARYNGOLOGY | Facility: CLINIC | Age: 63
End: 2023-10-25
Payer: COMMERCIAL

## 2023-10-25 ENCOUNTER — TRANSFERRED RECORDS (OUTPATIENT)
Dept: HEALTH INFORMATION MANAGEMENT | Facility: CLINIC | Age: 63
End: 2023-10-25
Payer: COMMERCIAL

## 2023-10-25 DIAGNOSIS — E11.42 TYPE 2 DIABETES MELLITUS WITH PERIPHERAL NEUROPATHY (H): ICD-10-CM

## 2023-10-25 RX ORDER — INSULIN ASPART 100 [IU]/ML
INJECTION, SOLUTION INTRAVENOUS; SUBCUTANEOUS
Qty: 60 ML | Refills: 0 | Status: SHIPPED | OUTPATIENT
Start: 2023-10-25 | End: 2023-11-24

## 2023-10-25 NOTE — TELEPHONE ENCOUNTER
"Requested Prescriptions   Pending Prescriptions Disp Refills    Insulin Aspart FlexPen 100 UNIT/ML SOPN [Pharmacy Med Name: INSULIN ASPART FLEXPEN INJ, 3ML] 30 mL 0     Sig: ADMINISTER 70 UNITS UNDER THE SKIN DAILY       Short Acting Insulin Protocol Passed - 10/25/2023 11:44 AM        Passed - Serum creatinine on file in past 12 months     Recent Labs   Lab Test 07/10/23  0814 01/27/16  1518 01/25/16  0704   CR 0.88   < >  --    CREAT  --   --  0.8    < > = values in this interval not displayed.       Ok to refill medication if creatinine is low          Passed - HgbA1C in past 3 or 6 months     If HgbA1C is 8 or greater, it needs to be on file within the past 3 months.  If less than 8, must be on file within the past 6 months.     Recent Labs   Lab Test 07/10/23  0814   A1C 6.0*             Passed - Medication is active on med list        Passed - Patient is age 18 or older        Passed - Recent (6 mo) or future (30 days) visit within the authorizing provider's specialty     Patient had office visit in the last 6 months or has a visit in the next 30 days with authorizing provider or within the authorizing provider's specialty.  See \"Patient Info\" tab in inbasket, or \"Choose Columns\" in Meds & Orders section of the refill encounter.                 "

## 2023-10-26 LAB
BACTERIA TISS BX CULT: ABNORMAL
BACTERIA TISS BX CULT: ABNORMAL
BACTERIA TISS BX CULT: NORMAL

## 2023-10-27 LAB
BACTERIA TISS BX CULT: ABNORMAL
BACTERIA TISS BX CULT: ABNORMAL

## 2023-10-28 ENCOUNTER — HEALTH MAINTENANCE LETTER (OUTPATIENT)
Age: 63
End: 2023-10-28

## 2023-10-30 ENCOUNTER — HOSPITAL ENCOUNTER (OUTPATIENT)
Dept: WOUND CARE | Facility: CLINIC | Age: 63
Discharge: HOME | End: 2023-10-30
Attending: PODIATRIST | Admitting: PODIATRIST
Payer: COMMERCIAL

## 2023-10-30 VITALS — SYSTOLIC BLOOD PRESSURE: 124 MMHG | HEART RATE: 64 BPM | TEMPERATURE: 97 F | DIASTOLIC BLOOD PRESSURE: 68 MMHG

## 2023-10-30 DIAGNOSIS — E11.622 DIABETIC ULCER OF LOWER EXTREMITY (H): Primary | ICD-10-CM

## 2023-10-30 DIAGNOSIS — E11.621 DIABETIC ULCER OF LEFT FOOT WITH FAT LAYER EXPOSED (H): ICD-10-CM

## 2023-10-30 DIAGNOSIS — L97.522 DIABETIC ULCER OF LEFT FOOT WITH FAT LAYER EXPOSED (H): ICD-10-CM

## 2023-10-30 DIAGNOSIS — L97.909 DIABETIC ULCER OF LOWER EXTREMITY (H): Primary | ICD-10-CM

## 2023-10-30 PROCEDURE — 99024 POSTOP FOLLOW-UP VISIT: CPT | Performed by: PODIATRIST

## 2023-10-30 PROCEDURE — 97602 WOUND(S) CARE NON-SELECTIVE: CPT

## 2023-10-31 NOTE — PROGRESS NOTES
[facility] [place] Progress Note    Subject: Patient was seen at wound center for b/l feet. He's known to myself for a long history of foot ulcers. Previously worse on the right foot, now worse on the left foot. He's been extremely active all summer, stating he's been on his feet for 10+ hours a day, knowing the risks of this to his feet.   -He now follows up for his left foot, following second metatarsal resection on 10/19. Had an MRI that confirmed osteomyelitis and was scheduled for surgery quickly afterwards. States no pain since surgery and notices swelling to ankle is much better. States was active on his foot and noticed a lot of drainage, which has now subsided. Has completed ciprofloxcin abx, still take augmentin long term per Id for chronic osteomyelitis.     PMH:   Past Medical History:   Diagnosis Date    Antiplatelet or antithrombotic long-term use     Ascending aorta dilatation (H24)     Cerebral artery occlusion with cerebral infarction (H)     Cerebral infarction (H)     2010    Gastroesophageal reflux disease with esophagitis     H/O blood clots 2022    Hyperlipidemia LDL goal <70     Hypertension     Nonalcoholic steatohepatitis 11/30/2022    Numbness and tingling     Obesity     GILA (obstructive sleep apnea) 10/22/2018    CPAP intolerant    PVD (peripheral vascular disease) (H24)     s/p left partial toe amputation    Renal stone     Tremor     Type 2 diabetes mellitus with diabetic polyneuropathy, with long-term current use of insulin (H)        Social Hx:   Social History     Socioeconomic History    Marital status:      Spouse name: Sydney    Number of children: 2    Years of education: Not on file    Highest education level: Master's degree (e.g., MA, MS, [place], MEd, MSW, ELIANA)   Occupational History    Occupation: marketing     Employer: [employer]     Comment: [employer]   Tobacco Use    Smoking status: Never    Smokeless tobacco: Never   Vaping Use    Vaping Use: Never  used   Substance and Sexual Activity    Alcohol use: Yes     Comment: rare---red wine 3x per month    Drug use: Never    Sexual activity: Not Currently     Partners: Female   Other Topics Concern    Parent/sibling w/ CABG, MI or angioplasty before 65F 55M? No   Social History Narrative    Not on file     Social Determinants of Health     Financial Resource Strain: Low Risk  (10/18/2023)    Financial Resource Strain     Within the past 12 months, have you or your family members you live with been unable to get utilities (heat, electricity) when it was really needed?: No   Food Insecurity: Low Risk  (10/18/2023)    Food Insecurity     Within the past 12 months, did you worry that your food would run out before you got money to buy more?: No     Within the past 12 months, did the food you bought just not last and you didn t have money to get more?: No   Transportation Needs: Low Risk  (10/18/2023)    Transportation Needs     Within the past 12 months, has lack of transportation kept you from medical appointments, getting your medicines, non-medical meetings or appointments, work, or from getting things that you need?: No   Physical Activity: Inactive (10/18/2023)    Exercise Vital Sign     Days of Exercise per Week: 0 days     Minutes of Exercise per Session: 50 min   Stress: No Stress Concern Present (10/18/2023)    [employer] - Occupational Stress Questionnaire     Feeling of Stress : Not at all   Social Connections: Moderately Integrated (10/18/2023)    Social Connection and Isolation Panel [NHANES]     Frequency of Communication with Friends and Family: Twice a week     Frequency of Social Gatherings with Friends and Family: Never     Attends Taoist Services: More than 4 times per year     Active Member of Clubs or Organizations: Yes     Attends Club or Organization Meetings: More than 4 times per year     Marital Status:    Interpersonal Safety: Low Risk  (10/18/2023)     Interpersonal Safety     Do you feel physically and emotionally safe where you currently live?: Yes     Within the past 12 months, have you been hit, slapped, kicked or otherwise physically hurt by someone?: No     Within the past 12 months, have you been humiliated or emotionally abused in other ways by your partner or ex-partner?: No   Housing Stability: Low Risk  (10/18/2023)    Housing Stability     Do you have housing? : Yes     Are you worried about losing your housing?: No       Surgical Hx:   Past Surgical History:   Procedure Laterality Date    AMPUTATE FOOT Left 8/18/2016    Procedure: AMPUTATE FOOT;  Surgeon: Jack Younger DPM;  Location: [facility]    AMPUTATE TOE(S) Right 2/1/2016    Procedure: AMPUTATE TOE(S);  Surgeon: Rachelle Manriquez DPM, Pod;  Location: [facility]    AMPUTATE TOE(S) Right 8/2/2019    Procedure: Right fourth toe partial amputation for treatment of osteomyelitis.;  Surgeon: Jack Younger DPM;  Location: [facility]    AMPUTATE TOE(S) Left 11/8/2019    Procedure: Left second toe amputation at the metatarsophalangeal joint.;  Surgeon: Rachelle Manriquez DPM, Podiatry/Foot and Ankle Surgery;  Location: [facility]    AMPUTATE TOE(S) Right 6/5/2022    Procedure: AMPUTATION OF RIGHT GREAT TOE;  Surgeon: Wili Quiles DPM;  Location: [facility]    AMPUTATE TOE(S) Right 7/28/2022    Procedure: Right foot partial first metatarsal resection;  Surgeon: Tiny Soto DPM;  Location: [facility]    ANGIOGRAM      BIOPSY BONE FOOT Right 7/24/2022    Procedure: Bone biopsy, right first metatarsal.;  Surgeon: Valentino Molina DPM;  Location: [facility]    COLONOSCOPY      COMBINED CYSTOSCOPY, RETROGRADES, URETEROSCOPY, INSERT STENT Left 8/21/2016    Procedure: COMBINED CYSTOSCOPY, RETROGRADES, URETEROSCOPY, INSERT STENT;  Surgeon: Artemio Valenzuela MD;  Location: [facility]    COMBINED CYSTOSCOPY, RETROGRADES, URETEROSCOPY, LASER HOLMIUM LITHOTRIPSY URETER(S), INSERT STENT Left 10/3/2016    Procedure:  COMBINED CYSTOSCOPY, RETROGRADES, URETEROSCOPY, LASER HOLMIUM LITHOTRIPSY URETER(S), INSERT STENT;  Surgeon: Artemio Valenzuela MD;  Location: [facility]    EXTRACORPOREAL SHOCK WAVE LITHOTRIPSY (ESWL) Left 9/8/2016    Procedure: EXTRACORPOREAL SHOCK WAVE LITHOTRIPSY (ESWL);  Surgeon: Artemio Valenzuela MD;  Location: [facility]    INCISION AND DRAINAGE FOOT, COMBINED Right 7/24/2022    Procedure: Incision and drainage, right foot ;  Surgeon: Valentino Molina DPM;  Location: [facility]    IRRIGATION AND DEBRIDEMENT FOOT, COMBINED Left 10/19/2023    Procedure: Left foot second metatarsal resection , . Left plantar ulcer excisional debridement, down to and including tendon, with closure, site measuring 4 cm x 2 cm x 1 cm;  Surgeon: Tiny Soto DPM;  Location: [facility]    OSTEOTOMY FOOT Right 11/30/2022    Procedure: 1. Right second metatarsal floating osteotomy  2. Right second digit proximal phalanx arthroplasty 3. Right percutaneous flexor tenotomy;  Surgeon: Tiny Soto DPM;  Location: [facility]    OSTEOTOMY FOOT Right 1/19/2023    Procedure: Right foot third metatarsal head excision, ulcer debridement, matatarsal osteotomies;  Surgeon: Tiny Soto DPM;  Location: [facility]    RECESSION GASTROCNEMIUS Right 2/1/2016    Procedure: RECESSION GASTROCNEMIUS;  Surgeon: Rachelle Manriquez DPM, Pod;  Location: [facility]       Allergies:    Allergies   Allergen Reactions    Statins Swelling     Other reaction(s): Other (see comments)  SIMCOR caused edema in extremities      Bactrim [Sulfamethoxazole-Trimethoprim] Nausea and Vomiting    Sulfa Antibiotics Nausea    Niacin Swelling     SIMCOR caused edema in extremities    Simvastatin Swelling     SIMCOR caused edema in extremities       Medications:   Current Outpatient Medications   Medication    amLODIPine (NORVASC) 5 MG tablet    amoxicillin-clavulanate (AUGMENTIN) 875-125 MG tablet    atorvastatin (LIPITOR) 40 MG tablet    blood glucose (NO BRAND SPECIFIED) lancets  standard    calcium carbonate (OS-NARA) 500 MG tablet    Cholecalciferol (VITAMIN D3) 25 MCG (1000 UT) CAPS    Co-Enzyme Q10 100 MG CAPS    Continuous Blood Gluc Sensor (FREESTYLE LUCERO 14 DAY SENSOR) MISC    ELIQUIS ANTICOAGULANT 5 MG tablet    gabapentin (NEURONTIN) 100 MG capsule    hydrochlorothiazide (MICROZIDE) 12.5 MG capsule    Insulin Aspart FlexPen 100 UNIT/ML SOPN    insulin degludec (TRESIBA FLEXTOUCH) 100 UNIT/ML pen    insulin pen needle (B-D U/F) 31G X 5 MM miscellaneous    ketoconazole (NIZORAL) 2 % external shampoo    lisinopril (ZESTRIL) 40 MG tablet    MAGNESIUM GLYCINATE PLUS PO    metFORMIN (GLUCOPHAGE XR) 500 MG 24 hr tablet    Multiple Vitamins-Minerals (MULTIVITAMIN PO)    mupirocin (BACTROBAN) 2 % external ointment    Omega-3 Fatty Acids (OMEGA-3 FISH OIL PO)    omeprazole (PRILOSEC) 40 MG DR capsule    propranolol (INDERAL) 20 MG tablet    Semaglutide, 1 MG/DOSE, (OZEMPIC, 1 MG/DOSE,) 4 MG/3ML pen    tadalafil (CIALIS) 5 MG tablet     No current facility-administered medications for this encounter.         Objective:  Vitals:  /68   Pulse 64   Temp 97  F (36.1  C) (Temporal)     A1C: 6.0 on 7/10/23     General:  Patient is alert and orientated.  NAD      Dermatologic: Right foot plantar ulcer: closed and healed   Left foot: incision sites intact. Dorsal one with one small area that has slight gapping, but no full dehiscence. Appears suture is missing. Plantar incision also intact with loose suture. No cellulitis. No edema.    .   Wound (used by OP [facility] only) 09/25/23 1505 Left plantar foot surgical (Active)   Thickness/Stage full thickness 10/30/23 1400   Base closed/resurfaced 10/30/23 1400   Periwound intact 10/30/23 1400   Periwound Temperature warm 10/30/23 1400   Periwound Skin Turgor soft 10/30/23 1400   Edges callused 10/30/23 1400   Length (cm) 0 10/30/23 1400   Width (cm) 0 10/30/23 1400   Depth (cm) 0 10/30/23 1400   Wound (cm^2) 0 cm^2 10/30/23 1400   Wound Volume (cm^3) 0  cm^3 10/30/23 1400   Wound healing % 100 10/30/23 1400   Drainage Characteristics/Odor serosanguineous 10/16/23 1555   Drainage Amount none 10/30/23 1400   Care, Wound non-select wound debridement performed. 10/30/23 1400       Wound (used by OP [facility] only) 10/30/23 1453 Left dorsal foot surgical (Active)   Thickness/Stage full thickness 10/30/23 1400   Base pink;red 10/30/23 1400   Periwound intact 10/30/23 1400   Periwound Temperature warm 10/30/23 1400   Periwound Skin Turgor soft 10/30/23 1400   Length (cm) 3.5 10/30/23 1400   Width (cm) 0.1 10/30/23 1400   Depth (cm) 1.7 10/30/23 1400   Wound (cm^2) 0.35 cm^2 10/30/23 1400   Wound Volume (cm^3) 0.6 cm^3 10/30/23 1400   Drainage Characteristics/Odor serosanguineous 10/30/23 1400   Drainage Amount moderate 10/30/23 1400   Care, Wound non-select wound debridement performed. 10/30/23 1400       Incision/Surgical Site 07/28/22 Right Foot (Active)       Incision/Surgical Site 11/30/22 Anterior;Right Toe (Comment  which one) (Active)       Incision/Surgical Site 01/19/23 Right Toe (Comment  which one) (Active)       Incision/Surgical Site 10/19/23 Left Foot (Active)   Incision Assessment UTV 10/19/23 1529   Closure Sutures 10/19/23 1251   Incision Drainage Amount None 10/19/23 1529   Incision Care Ice applied 10/19/23 1529   Dressing Intervention Clean, dry, intact 10/19/23 1529          Vascular: DP & PT pulses are intact & regular bilaterally.  No significant edema or varicosities noted.  CFT and skin temperature is normal to both lower extremities.     Neurologic: Lower extremity sensation is absent      Musculoskeletal: Patient is ambulatory without assistive device. S/p several digital amputations b/l.    Imaging:    Left foot XR 9/13      IMPRESSION: Partial amputation of the first and second toes at the MTP  joints. No fracture or osteomyelitis. No degenerative or erosive  arthritic change. Hammer toe deformities.     Cultures:      Foot, Left; Tissue   0 Result  Notes  Culture Isolated in broth only Finegoldia magna Abnormal    On day 5 of incubation  Susceptibilities not routinely done, refer to antibiogram to view typical susceptibility profiles   Isolated in broth only Pseudomonas aeruginosa Abnormal    On day 5 of incubation        Foot, Left; Tissue   0 Result Notes  Culture No anaerobic organisms isolated      Isolated in broth only Staphylococcus epidermidis Abnormal    On day 2 of incubation  Not isolated or reported on routine aerobic culture  Susceptibilities not routinely done, refer to antibiogram to view typical susceptibility profiles        Specimens:   A) - Foot, Left, 2nd left proximal metatarsal                                                        B) - Foot, Left, 2nd left metatarsal head                                                  Final Diagnosis   A.  Colon, left foot, second left proximal metatarsal, biopsy-  Fibrosis with minimal focal chronic inflammatory change     B.  Bone, left foot, second left metatarsal head, biopsy-  Nonneoplastic bone with acute and chronic osteomyelitis       Assessment:   S/p left foot second ray resection, plantar foot ulcer debridement on 10/19   S/p right foot third head resection, fourth metatarsal floating osteotomy, ulcer debridement  --> being treated for residual osteomyelitis, 1/19/23   Sites healed     Plan:    -Discussed all findings with patient. Chart and imaging reviewed.   -Patient's foot much improved in appearance since surgery, swelling, drainage and cellulitis resolved.   -Had been on cipro for several weeks for suspected pseudomonas, would not recommend more now that site has been surgically debrided  -Reviewed pathology with him: proximal pathology may have osteomyelitis seen on pathology, but without bacterial growth. Is on long term augmentin for right side with continued follow up with ID. Will encourage and continue this. Monitor clinically.   -Discussed need for minimal activity for full  healing. Patient overall very active and has been stressing sites for months.   -Follow up in 2-3 weeks       Tiny Soto DPM                Further instructions from your care team         Crispin Rojas      1960    A DME order was not completed because the patient declined the need for supplies      Wound Dressing Change: left plantar foot and left dorsal foot  - Wash your hands with soap and water before you begin your dressing change and prepare a clean surface for dressings.  - Cleanse with mild unscented soap and water (such as Cetaphil, Cerave or Dove)   - Apply thick, high-quality moisturizer to intact skin (such as CeraVe, Eucerin, Aquaphor or Vanicream)   - Apply betadine to wounds  - Cover with dry gauze roll  - Secure with tape  - Apply Spandigrip size E from toes to ankle and size F from ankle to knee  Leave in place; Change outer gauze roll and tape if becomes soiled or loose     Compression:   Spandigrip E and can be removed at night and put back on first thing in the morning.   Please remove compression dressing if toes turn blue and/or tingle and can not be relieved by raising the leg for one hour. If unable to reapply in the morning, keep compression on until next dressing change.     Limit walking/weight bearing as much and possible   Use AirCast boot with knee roller. Continue shoes with diabetic inserts until you get your new ones from [employer].  Whenever you sit raise your ankle above your hips to promote wound healing.      Blood Sugars:  Keep blood sugars below 150 and A1C below 7      Tiny Soto, D.P.M. October 30, 2023    Call us at [phone] if you have any questions about your wounds, have redness or swelling around your wound, have a fever of 101 degrees Fahrenheit or greater or if you have any other problems or concerns. We answer the phone Monday through Friday 8 am to 4 pm, please leave a message as we check the voicemail frequently throughout the day.     If you  had a positive experience please indicate that on your patient satisfaction survey form that [facility] will be sending you.  It was a pleasure meeting with you today.  Thank you for allowing me and my team the privilege of caring for you today.  YOU are the reason we are here, and I truly hope we provided you with the excellent service you deserve.  Please let us know if there is anything else we can do for you so that we can be sure you are leaving completely satisfied with your care experience.      If you have any billing related questions please call the [facility] Business office at [phone]. The clinic staff does not handle billing related matters.  If you are scheduled to have a follow up appointment, you will receive a reminder call the day before your visit. On the appointment day please arrive 15 minutes prior to your appointment time. If you are unable to keep that appointment, please call the clinic to cancel or reschedule. If you are more than 10 minutes late or greater for your scheduled appointment time, the clinic policy is that you may be asked to reschedule.

## 2023-11-01 ENCOUNTER — TELEPHONE (OUTPATIENT)
Dept: FAMILY MEDICINE | Facility: CLINIC | Age: 63
End: 2023-11-01
Payer: COMMERCIAL

## 2023-11-01 NOTE — TELEPHONE ENCOUNTER
Listed number for caller not in service. Found correct number. Spoke with [facility] nursing and advised approval of hold orders from  Dr. Clemens. They will call patient back with hold instructions. Teresa Lakhani R.N.

## 2023-11-01 NOTE — TELEPHONE ENCOUNTER
"Received incoming call from [facility] who is requesting \"hold orders\" for 3 day hold on Eliquis prior to EGD that scheduled on 11/29/2023.    Okay to relay verbal or via fax.     Fax: [phone]    Thank you,  Crow Velez, Triage RN [facility]  9:24 AM 11/1/2023     "

## 2023-11-20 ENCOUNTER — HOSPITAL ENCOUNTER (OUTPATIENT)
Dept: WOUND CARE | Facility: CLINIC | Age: 63
Discharge: HOME | End: 2023-11-20
Attending: PODIATRIST | Admitting: PODIATRIST
Payer: COMMERCIAL

## 2023-11-20 VITALS — TEMPERATURE: 97.6 F | RESPIRATION RATE: 17 BRPM

## 2023-11-20 DIAGNOSIS — Z89.422 S/P AMPUTATION OF LESSER TOE, LEFT (H): ICD-10-CM

## 2023-11-20 DIAGNOSIS — L97.522 CHRONIC FOOT ULCER WITH FAT LAYER EXPOSED, LEFT (H): ICD-10-CM

## 2023-11-20 DIAGNOSIS — E11.621 DIABETIC ULCER OF LEFT FOOT WITH FAT LAYER EXPOSED (H): Primary | ICD-10-CM

## 2023-11-20 DIAGNOSIS — L97.512 PLANTAR ULCER OF RIGHT FOOT WITH FAT LAYER EXPOSED (H): ICD-10-CM

## 2023-11-20 DIAGNOSIS — L97.522 DIABETIC ULCER OF LEFT FOOT WITH FAT LAYER EXPOSED (H): Primary | ICD-10-CM

## 2023-11-20 PROCEDURE — 99024 POSTOP FOLLOW-UP VISIT: CPT | Performed by: PODIATRIST

## 2023-11-20 PROCEDURE — 97602 WOUND(S) CARE NON-SELECTIVE: CPT

## 2023-11-20 PROCEDURE — G0463 HOSPITAL OUTPT CLINIC VISIT: HCPCS | Mod: 25

## 2023-11-20 NOTE — PROGRESS NOTES
Patient arrived for wound care visit. Wound Care Nurse time spent evaluating patient record, and completed a full evaluation; provided recommendation based on treatment plan. Reviewed discharge instructions, patient education, and discussed plan of care with appropriate medical team staff members and patient and/or family members.

## 2023-11-20 NOTE — DISCHARGE INSTRUCTIONS
Crispin Rojas      1960    A DME order for supplies has been placed to [facility], [address]. If there are any issues with your order including not receiving the order please call [facility] at [phone] option 1. They can also provide a tracking number for you if you had supplies shipped to you.    Dressing changes outside of clinic are being performed by Patient    PLAN:  Sutures removed from left dorsal and plantar foot today (11/20/23)  Ok to shower  Follow up with Dr. Soto in [place] on 12/6/2023 (Call to schedule)    Wound Dressing Change: right plantar foot   - Wash your hands with soap and water before you begin your dressing change and prepare a clean surface for dressings.  - Cleanse with mild unscented soap and water (such as Cetaphil, Cerave or Dove)   - Apply thick, high-quality moisturizer to intact skin (such as CeraVe, Eucerin, Aquaphor or Vanicream)   - Apply Lotion with Urea to calluses to soften  - Apply Zinc barrier cream to plantar aspects under toes 2,3 and 5 if macerated  - Cover plantar wound with one 4x4 Mepilex dressing  - Apply compression socks (wear while awake, take off at night)  Change every other day and as needed for soilage.    Wound Dressing Change: left plantar foot and left dorsal foot  - Wash your hands with soap and water before you begin your dressing change and prepare a clean surface for dressings.  - Cleanse with mild unscented soap and water (such as Cetaphil, Cerave or Dove)   - Apply thick, high-quality moisturizer to intact skin (such as CeraVe, Eucerin, Aquaphor or Vanicream)   - Apply Lotion with Urea to calluses to soften  - Apply betadine to dorsal wounds  - Cover dorsal wound with one 4x4 Mepilex dressing  - Apply compression socks (wear while awake, take off at night)  Change every other day and as needed for soilage.     Compression:   Your compression is Compression socks and can be removed at night and put back on first  thing in the morning.   Please remove compression dressing if toes turn blue and/or tingle and can not be relieved by raising the leg for one hour. If unable to reapply in the morning, keep compression on until next dressing change.    Walk as much as you can, as you are able. Whenever you sit raise your ankle above your hips to promote wound healing.     GRADUALLY increase activity levels over the next couple weeks.  Regular shoes with orthotics. Whenever you sit raise your ankle above your hips to promote wound healing.       Blood Sugars: Keep blood sugars below 150 and A1C below 7        GONZALO Rios.P.M. November 20, 2023    Call us at [phone] if you have any questions about your wounds, have redness or swelling around your wound, have a fever of 101 degrees Fahrenheit or greater or if you have any other problems or concerns. We answer the phone Monday through Friday 8 am to 4 pm, please leave a message as we check the voicemail frequently throughout the day.     If you had a positive experience please indicate that on your patient satisfaction survey form that [facility] will be sending you.    It was a pleasure meeting with you today.  Thank you for allowing me and my team the privilege of caring for you today.  YOU are the reason we are here, and I truly hope we provided you with the excellent service you deserve.  Please let us know if there is anything else we can do for you so that we can be sure you are leaving completely satisfied with your care experience.      If you have any billing related questions please call the [facility] Business office at [phone]. The clinic staff does not handle billing related matters.    If you are scheduled to have a follow up appointment, you will receive a reminder call the day before your visit. On the appointment day please arrive 15 minutes prior to your appointment time. If you are unable to keep that appointment, please call the clinic to cancel  or reschedule. If you are more than 10 minutes late or greater for your scheduled appointment time, the clinic policy is that you may be asked to reschedule.     WDL

## 2023-11-22 NOTE — PROGRESS NOTES
[facility] Progress Note    Subject: Patient was seen at wound center for b/l feet. He's known to myself for a long history of foot ulcers. Previously worse on the right foot, now worse on the left foot. He's been extremely active all summer, stating he's been on his feet for 10+ hours a day, knowing the risks of this to his feet.   -He now follows up for his left foot, following second metatarsal resection on 10/19. Had an MRI that confirmed osteomyelitis and was scheduled for surgery quickly afterwards. States no pain since surgery and notices swelling to ankle is much better.  was active on his foot and noticed a lot of drainage, which has now subsided. Has completed ciprofloxcin abx, still take augmentin long term per Id for chronic osteomyelitis.   11/20: Follows up for b/l feet. Has been traveling a lot and walking a lot on his right foot. States had a blister form on his right foot, but that it dried and healed. States no drainage from right foot. States left foot also hasn't been draining other than a small site from the dorsal aspect. Denies pain. Denies N/F/V/C/D. Feels well.     PMH:   Past Medical History:   Diagnosis Date    Antiplatelet or antithrombotic long-term use     Ascending aorta dilatation (H24)     Cerebral artery occlusion with cerebral infarction (H)     Cerebral infarction (H)     2010    Gastroesophageal reflux disease with esophagitis     H/O blood clots 2022    Hyperlipidemia LDL goal <70     Hypertension     Nonalcoholic steatohepatitis 11/30/2022    Numbness and tingling     Obesity     GILA (obstructive sleep apnea) 10/22/2018    CPAP intolerant    PVD (peripheral vascular disease) (H24)     s/p left partial toe amputation    Renal stone     Tremor     Type 2 diabetes mellitus with diabetic polyneuropathy, with long-term current use of insulin (H)        Social Hx:   Social History     Socioeconomic History    Marital status:      Spouse name: Sydney     Number of children: 2    Years of education: Not on file    Highest education level: Master's degree (e.g., MA, MS, [place], MEd, MSW, ELIANA)   Occupational History    Occupation: marketing     Employer: [employer]     Comment: [employer]   Tobacco Use    Smoking status: Never    Smokeless tobacco: Never   Vaping Use    Vaping Use: Never used   Substance and Sexual Activity    Alcohol use: Yes     Comment: rare---red wine 3x per month    Drug use: Never    Sexual activity: Not Currently     Partners: Female   Other Topics Concern    Parent/sibling w/ CABG, MI or angioplasty before 65F 55M? No   Social History Narrative    Not on file     Social Determinants of Health     Financial Resource Strain: Low Risk  (10/18/2023)    Financial Resource Strain     Within the past 12 months, have you or your family members you live with been unable to get utilities (heat, electricity) when it was really needed?: No   Food Insecurity: Low Risk  (10/18/2023)    Food Insecurity     Within the past 12 months, did you worry that your food would run out before you got money to buy more?: No     Within the past 12 months, did the food you bought just not last and you didn t have money to get more?: No   Transportation Needs: Low Risk  (10/18/2023)    Transportation Needs     Within the past 12 months, has lack of transportation kept you from medical appointments, getting your medicines, non-medical meetings or appointments, work, or from getting things that you need?: No   Physical Activity: Inactive (10/18/2023)    Exercise Vital Sign     Days of Exercise per Week: 0 days     Minutes of Exercise per Session: 50 min   Stress: No Stress Concern Present (10/18/2023)    [place] [place] [employer] - Occupational Stress Questionnaire     Feeling of Stress : Not at all   Social Connections: Moderately Integrated (10/18/2023)    Social Connection and Isolation Panel [NHANES]     Frequency of Communication with Friends and Family:  Twice a week     Frequency of Social Gatherings with Friends and Family: Never     Attends [employer] Services: More than 4 times per year     Active Member of Clubs or Organizations: Yes     Attends Club or Organization Meetings: More than 4 times per year     Marital Status:    Interpersonal Safety: Low Risk  (10/18/2023)    Interpersonal Safety     Do you feel physically and emotionally safe where you currently live?: Yes     Within the past 12 months, have you been hit, slapped, kicked or otherwise physically hurt by someone?: No     Within the past 12 months, have you been humiliated or emotionally abused in other ways by your partner or ex-partner?: No   Housing Stability: Low Risk  (10/18/2023)    Housing Stability     Do you have housing? : Yes     Are you worried about losing your housing?: No       Surgical Hx:   Past Surgical History:   Procedure Laterality Date    AMPUTATE FOOT Left 8/18/2016    Procedure: AMPUTATE FOOT;  Surgeon: Jack Younger DPM;  Location: [facility]    AMPUTATE TOE(S) Right 2/1/2016    Procedure: AMPUTATE TOE(S);  Surgeon: Rachelle Manriquez DPM, Pod;  Location: [facility]    AMPUTATE TOE(S) Right 8/2/2019    Procedure: Right fourth toe partial amputation for treatment of osteomyelitis.;  Surgeon: Jack Younger DPM;  Location: [facility]    AMPUTATE TOE(S) Left 11/8/2019    Procedure: Left second toe amputation at the metatarsophalangeal joint.;  Surgeon: Rachelle Manriquez DPM, Podiatry/Foot and Ankle Surgery;  Location: [facility] OR    AMPUTATE TOE(S) Right 6/5/2022    Procedure: AMPUTATION OF RIGHT GREAT TOE;  Surgeon: Wili Quiles DPM;  Location: [facility]    AMPUTATE TOE(S) Right 7/28/2022    Procedure: Right foot partial first metatarsal resection;  Surgeon: Tiny Soto DPM;  Location: [facility] OR    ANGIOGRAM      BIOPSY BONE FOOT Right 7/24/2022    Procedure: Bone biopsy, right first metatarsal.;  Surgeon: Valentino Molina DPM;  Location:  OR    COLONOSCOPY      COMBINED  CYSTOSCOPY, RETROGRADES, URETEROSCOPY, INSERT STENT Left 8/21/2016    Procedure: COMBINED CYSTOSCOPY, RETROGRADES, URETEROSCOPY, INSERT STENT;  Surgeon: Artemio Valenzuela MD;  Location: [facility] OR    COMBINED CYSTOSCOPY, RETROGRADES, URETEROSCOPY, LASER HOLMIUM LITHOTRIPSY URETER(S), INSERT STENT Left 10/3/2016    Procedure: COMBINED CYSTOSCOPY, RETROGRADES, URETEROSCOPY, LASER HOLMIUM LITHOTRIPSY URETER(S), INSERT STENT;  Surgeon: Artemio Valenzuela MD;  Location: [facility] OR    EXTRACORPOREAL SHOCK WAVE LITHOTRIPSY (ESWL) Left 9/8/2016    Procedure: EXTRACORPOREAL SHOCK WAVE LITHOTRIPSY (ESWL);  Surgeon: Artemio Valenzuela MD;  Location: [facility] OR    INCISION AND DRAINAGE FOOT, COMBINED Right 7/24/2022    Procedure: Incision and drainage, right foot ;  Surgeon: Valentino Molina DPM;  Location: [facility] OR    IRRIGATION AND DEBRIDEMENT FOOT, COMBINED Left 10/19/2023    Procedure: Left foot second metatarsal resection , . Left plantar ulcer excisional debridement, down to and including tendon, with closure, site measuring 4 cm x 2 cm x 1 cm;  Surgeon: Tiny Soto DPM;  Location: [facility] OR    OSTEOTOMY FOOT Right 11/30/2022    Procedure: 1. Right second metatarsal floating osteotomy  2. Right second digit proximal phalanx arthroplasty 3. Right percutaneous flexor tenotomy;  Surgeon: Tiny Soto DPM;  Location: [facility]    OSTEOTOMY FOOT Right 1/19/2023    Procedure: Right foot third metatarsal head excision, ulcer debridement, matatarsal osteotomies;  Surgeon: Tiny Soto DPM;  Location: [facility] OR    RECESSION GASTROCNEMIUS Right 2/1/2016    Procedure: RECESSION GASTROCNEMIUS;  Surgeon: Rachelle Manriquez DPM, Pod;  Location: [facility] OR       Allergies:    Allergies   Allergen Reactions    Statins Swelling     Other reaction(s): Other (see comments)  SIMCOR caused edema in extremities      Bactrim [Sulfamethoxazole-Trimethoprim] Nausea and Vomiting    Sulfa Antibiotics Nausea    Niacin Swelling     SIMCOR caused  edema in extremities    Simvastatin Swelling     SIMCOR caused edema in extremities       Medications:   Current Outpatient Medications   Medication    amLODIPine (NORVASC) 5 MG tablet    amoxicillin-clavulanate (AUGMENTIN) 875-125 MG tablet    atorvastatin (LIPITOR) 40 MG tablet    blood glucose (NO BRAND SPECIFIED) lancets standard    calcium carbonate (OS-NARA) 500 MG tablet    Cholecalciferol (VITAMIN D3) 25 MCG (1000 UT) CAPS    Co-Enzyme Q10 100 MG CAPS    Continuous Blood Gluc Sensor (FREESTYLE LUCERO 14 DAY SENSOR) MISC    ELIQUIS ANTICOAGULANT 5 MG tablet    gabapentin (NEURONTIN) 100 MG capsule    hydrochlorothiazide (MICROZIDE) 12.5 MG capsule    Insulin Aspart FlexPen 100 UNIT/ML SOPN    insulin degludec (TRESIBA FLEXTOUCH) 100 UNIT/ML pen    insulin pen needle (B-D U/F) 31G X 5 MM miscellaneous    ketoconazole (NIZORAL) 2 % external shampoo    lisinopril (ZESTRIL) 40 MG tablet    MAGNESIUM GLYCINATE PLUS PO    metFORMIN (GLUCOPHAGE XR) 500 MG 24 hr tablet    Multiple Vitamins-Minerals (MULTIVITAMIN PO)    mupirocin (BACTROBAN) 2 % external ointment    Omega-3 Fatty Acids (OMEGA-3 FISH OIL PO)    omeprazole (PRILOSEC) 40 MG DR capsule    propranolol (INDERAL) 20 MG tablet    Semaglutide, 1 MG/DOSE, (OZEMPIC, 1 MG/DOSE,) 4 MG/3ML pen    tadalafil (CIALIS) 5 MG tablet     No current facility-administered medications for this encounter.         Objective:  Vitals:  Temp 97.6  F (36.4  C) (Temporal)   Resp 17     A1C: 6.0 on 7/10/23     General:  Patient is alert and orientated.  NAD      Dermatologic: Right foot plantar ulcer: small superficial breakdown. Dry. Blister site dried and healed.   Left foot: incision sites intact. Dorsal one with one small area that has slight gapping, but no full dehiscence. Plantar incision well closed and epithelialized. No dehiscence. No cellulitis. No edema.    .   Wound (used by [facility] only) 09/25/23 1505 Left plantar foot surgical (Active)   Thickness/Stage full thickness  11/20/23 1518   Base closed/resurfaced;other (see comments) 11/20/23 1518   Periwound intact 11/20/23 1518   Periwound Temperature warm 11/20/23 1518   Periwound Skin Turgor soft 11/20/23 1518   Edges callused 11/20/23 1518   Length (cm) 0 10/30/23 1400   Width (cm) 0 10/30/23 1400   Depth (cm) 0 10/30/23 1400   Wound (cm^2) 0 cm^2 10/30/23 1400   Wound Volume (cm^3) 0 cm^3 10/30/23 1400   Wound healing % 100 10/30/23 1400   Drainage Characteristics/Odor bleeding controlled 11/20/23 1518   Drainage Amount moderate 11/20/23 1518   Care, Wound non-select wound debridement performed. 11/20/23 1518       [facility]30/23 1453 Left dorsal foot surgical (Active)   Thickness/Stage full thickness 11/20/23 1518   Base pink;red 11/20/23 1518   Periwound intact 11/20/23 1518   Periwound Temperature warm 11/20/23 1518   Periwound Skin Turgor soft 11/20/23 1518   Length (cm) 2 11/20/23 1518   Width (cm) 0.3 11/20/23 1518   Depth (cm) 0.2 11/20/23 1518   Wound (cm^2) 0.6 cm^2 11/20/23 1518   Wound Volume (cm^3) 0.12 cm^3 11/20/23 1518   Wound healing % -71.43 11/20/23 1518   Drainage Characteristics/Odor serosanguineous 11/20/23 1518   Drainage Amount moderate 11/20/23 1518   Care, Wound non-select wound debridement performed. 11/20/23 1518       Wound (used by [facility] only) 11/20/23 1519 Right plantar foot blisters (Active)   Thickness/Stage full thickness 11/20/23 1518   Base pink;red 11/20/23 1518   Periwound intact;ecchymotic;macerated 11/20/23 1518   Periwound Temperature warm 11/20/23 1518   Periwound Skin Turgor firm 11/20/23 1518   Edges callused 11/20/23 1518   Length (cm) 0.4 11/20/23 1518   Width (cm) 0.2 11/20/23 1518   Depth (cm) 0.1 11/20/23 1518   Wound (cm^2) 0.08 cm^2 11/20/23 1518   Wound Volume (cm^3) 0.01 cm^3 11/20/23 1518   Drainage Characteristics/Odor serous 11/20/23 1518   Drainage Amount scant 11/20/23 1518   Care, Wound non-select wound debridement performed. 11/20/23 1518        Incision/Surgical Site 07/28/22 Right Foot (Active)       Incision/Surgical Site 11/30/22 Anterior;Right Toe (Comment  which one) (Active)       Incision/Surgical Site 01/19/23 Right Toe (Comment  which one) (Active)       Incision/Surgical Site 10/19/23 Left Foot (Active)          Vascular: DP & PT pulses are intact & regular bilaterally.  No significant edema or varicosities noted.  CFT and skin temperature is normal to both lower extremities.     Neurologic: Lower extremity sensation is absent      Musculoskeletal: Patient is ambulatory without assistive device. S/p several digital amputations b/l.     Imaging:    Left foot XR 9/13      IMPRESSION: Partial amputation of the first and second toes at the MTP  joints. No fracture or osteomyelitis. No degenerative or erosive  arthritic change. Hammer toe deformities.       Cultures:       Foot, Left; Tissue   0 Result Notes  Culture Isolated in broth only Finegoldia magna Abnormal    On day 5 of incubation  Susceptibilities not routinely done, refer to antibiogram to view typical susceptibility profiles   Isolated in broth only Pseudomonas aeruginosa Abnormal    On day 5 of incubation         Foot, Left; Tissue   0 Result Notes  Culture No anaerobic organisms isolated       Isolated in broth only Staphylococcus epidermidis Abnormal    On day 2 of incubation  Not isolated or reported on routine aerobic culture  Susceptibilities not routinely done, refer to antibiogram to view typical susceptibility profiles         Specimens:   A) - Foot, Left, 2nd left proximal metatarsal                                                        B) - Foot, Left, 2nd left metatarsal head                                                   Final Diagnosis   A.  Colon, left foot, second left proximal metatarsal, biopsy-  Fibrosis with minimal focal chronic inflammatory change     B.  Bone, left foot, second left metatarsal head, biopsy-  Nonneoplastic bone with acute and chronic  osteomyelitis       Assessment:   S/p left foot second ray resection, plantar foot ulcer debridement on 10/19   S/p right foot third head resection, fourth metatarsal floating osteotomy, ulcer debridement  --> being treated for residual osteomyelitis, 1/19/23   Sites healed      Plan:    -Discussed all findings with patient. Chart and imaging reviewed.   -Patient's foot much improved in appearance since surgery, swelling, drainage and cellulitis resolved.   -Sites now healed to left foot. Right foot with very superficial breakdown, likely due to extensive offloading that's been done to left foot.   -Recommend transition to full WB with custom molded orthotics. Has recent pair made.   -Recommend continued close monitoring of sites. Can continue bandaids to site to monitor drainage. No significant wound care necessary.   -Can discharge from [facility]. Further follow up in [place] clinic in 3-4 weeks.         Tiny Soto DPM                  Further instructions from your care team         Crispin Rojas      1960    A DME order for supplies has been placed to [facility], [address]. If there are any issues with your order including not receiving the order please call [facility] at [phone] option 1. They can also provide a tracking number for you if you had supplies shipped to you.    Dressing changes outside of clinic are being performed by Patient    PLAN:  Sutures removed from left dorsal and plantar foot today (11/20/23)  Ok to shower  Follow up with Dr. Soto in [place] on 12/6/2023 (Call to schedule)    Wound Dressing Change: right plantar foot   - Wash your hands with soap and water before you begin your dressing change and prepare a clean surface for dressings.  - Cleanse with mild unscented soap and water (such as Cetaphil, Cerave or Dove)   - Apply thick, high-quality moisturizer to intact skin (such as CeraVe, Eucerin, Aquaphor or Vanicream)   - Apply Lotion  with Urea to calluses to soften  - Apply Zinc barrier cream to plantar aspects under toes 2,3 and 5 if macerated  - Cover plantar wound with one 4x4 Mepilex dressing  - Apply compression socks (wear while awake, take off at night)  Change every other day and as needed for soilage.    Wound Dressing Change: left plantar foot and left dorsal foot  - Wash your hands with soap and water before you begin your dressing change and prepare a clean surface for dressings.  - Cleanse with mild unscented soap and water (such as Cetaphil, Cerave or Dove)   - Apply thick, high-quality moisturizer to intact skin (such as CeraVe, Eucerin, Aquaphor or Vanicream)   - Apply Lotion with Urea to calluses to soften  - Apply betadine to dorsal wounds  - Cover dorsal wound with one 4x4 Mepilex dressing  - Apply compression socks (wear while awake, take off at night)  Change every other day and as needed for soilage.     Compression:   Your compression is Compression socks and can be removed at night and put back on first thing in the morning.   Please remove compression dressing if toes turn blue and/or tingle and can not be relieved by raising the leg for one hour. If unable to reapply in the morning, keep compression on until next dressing change.    Walk as much as you can, as you are able. Whenever you sit raise your ankle above your hips to promote wound healing.     GRADUALLY increase activity levels over the next couple weeks.  Regular shoes with orthotics. Whenever you sit raise your ankle above your hips to promote wound healing.       Blood Sugars: Keep blood sugars below 150 and A1C below 7        GONZALO Rios.P.M. November 20, 2023    Call us at [phone] if you have any questions about your wounds, have redness or swelling around your wound, have a fever of 101 degrees Fahrenheit or greater or if you have any other problems or concerns. We answer the phone Monday through Friday 8 am to 4 pm, please leave a message as  we check the voicemail frequently throughout the day.     If you had a positive experience please indicate that on your patient satisfaction survey form that [facility] will be sending you.    It was a pleasure meeting with you today.  Thank you for allowing me and my team the privilege of caring for you today.  YOU are the reason we are here, and I truly hope we provided you with the excellent service you deserve.  Please let us know if there is anything else we can do for you so that we can be sure you are leaving completely satisfied with your care experience.      If you have any billing related questions please call the [facility] Business office at [phone]. The clinic staff does not handle billing related matters.    If you are scheduled to have a follow up appointment, you will receive a reminder call the day before your visit. On the appointment day please arrive 15 minutes prior to your appointment time. If you are unable to keep that appointment, please call the clinic to cancel or reschedule. If you are more than 10 minutes late or greater for your scheduled appointment time, the clinic policy is that you may be asked to reschedule.

## 2023-11-24 ENCOUNTER — TELEPHONE (OUTPATIENT)
Dept: ENDOCRINOLOGY | Facility: CLINIC | Age: 63
End: 2023-11-24
Payer: COMMERCIAL

## 2023-11-24 DIAGNOSIS — E11.42 TYPE 2 DIABETES MELLITUS WITH PERIPHERAL NEUROPATHY (H): ICD-10-CM

## 2023-11-24 RX ORDER — INSULIN ASPART 100 [IU]/ML
70 INJECTION, SOLUTION INTRAVENOUS; SUBCUTANEOUS DAILY
Qty: 30 ML | Refills: 0 | Status: SHIPPED | OUTPATIENT
Start: 2023-11-24 | End: 2024-02-16

## 2023-11-24 NOTE — TELEPHONE ENCOUNTER
[facility] Call Center    Phone Message    May a detailed message be left on voicemail: yes     Reason for Call: Medication Question or concern regarding medication   Prescription Clarification  Name of Medication:   Insulin Aspart FlexPen 100 UNIT/ML SOPN     Prescribing Provider:   Shira Montanez NP        Pharmacy:    [facility] at [address] [place] [address]   What on the order needs clarification?     The [place] in another [place] and he forgot his medication back home and he needed enough to last him until he back home the patient didn't provide the length of time he'll be out of town please call to get that information and when his medication has been sent please call him so he can reach out to the pharmacy thank you.       Action Taken: Message routed to:  Other:   [facility]          Travel Screening: Not Applicable

## 2023-12-06 ENCOUNTER — OFFICE VISIT (OUTPATIENT)
Dept: PODIATRY | Facility: CLINIC | Age: 63
End: 2023-12-06
Payer: COMMERCIAL

## 2023-12-06 VITALS — DIASTOLIC BLOOD PRESSURE: 70 MMHG | BODY MASS INDEX: 31.5 KG/M2 | WEIGHT: 229 LBS | SYSTOLIC BLOOD PRESSURE: 136 MMHG

## 2023-12-06 DIAGNOSIS — Z98.890 S/P FOOT SURGERY, RIGHT: ICD-10-CM

## 2023-12-06 DIAGNOSIS — L97.521 ULCER OF LEFT FOOT, LIMITED TO BREAKDOWN OF SKIN (H): Primary | ICD-10-CM

## 2023-12-06 DIAGNOSIS — S98.112A AMPUTATION OF LEFT GREAT TOE (H): ICD-10-CM

## 2023-12-06 PROCEDURE — 99024 POSTOP FOLLOW-UP VISIT: CPT | Performed by: PODIATRIST

## 2023-12-06 PROCEDURE — 11042 DBRDMT SUBQ TIS 1ST 20SQCM/<: CPT | Mod: 58 | Performed by: PODIATRIST

## 2023-12-06 NOTE — LETTER
12/6/2023         RE: Crispin Rojas  [address] [facility] [address]        Dear Colleague,    Thank you for referring your patient, Crispin Rojas, to the [facility]. Please see a copy of my visit note below.    [facility]:     Subject: Patient was seen at wound center for b/l feet. He's known to myself for a long history of foot ulcers. Previously worse on the right foot, now worse on the left foot. He's been extremely active all summer, stating he's been on his feet for 10+ hours a day, knowing the risks of this to his feet.   -He now follows up for his left foot, following second metatarsal resection on 10/19. Had an MRI that confirmed osteomyelitis and was scheduled for surgery quickly afterwards. States no pain since surgery and notices swelling to ankle is much better. States was active on his foot and noticed a lot of drainage, which has now subsided. Has completed ciprofloxcin abx, still take augmentin long term per Id for chronic osteomyelitis.   11/20: Follows up for b/l feet. Has been traveling a lot and walking a lot on his right foot. States had a blister form on his right foot, but that it dried and healed. States no drainage from right foot. States left foot also hasn't been draining other than a small site from the dorsal aspect. Denies pain. Denies N/F/V/C/D. Feels well.     12/6: Follows up for b/l feet. States feels well, but now has more drainage from left foot. Presents in custom molded orthotics/shoes. States that left foot had drainage over the past couple of days, but then none today. States no pain to site. States appears adjacent to previous problem. No problems to right foot. Has some concern because has a lot of travel plans coming up, including going to [place] over [place] for approx 2 weeks.     PMH:   Past Medical History:   Diagnosis Date     Antiplatelet or antithrombotic long-term use      Ascending aorta dilatation  (H24)      Cerebral artery occlusion with cerebral infarction (H)      Cerebral infarction (H)     2010     Gastroesophageal reflux disease with esophagitis      H/O blood clots 2022     Hyperlipidemia LDL goal <70      Hypertension      Nonalcoholic steatohepatitis 11/30/2022     Numbness and tingling      Obesity      GILA (obstructive sleep apnea) 10/22/2018    CPAP intolerant     PVD (peripheral vascular disease) (H24)     s/p left partial toe amputation     Renal stone      Tremor      Type 2 diabetes mellitus with diabetic polyneuropathy, with long-term current use of insulin (H)        Social Hx:   Social History     Socioeconomic History     Marital status:      Spouse name: Sydney     Number of children: 2     Years of education: Not on file     Highest education level: Master's degree (e.g., MA, MS, [place], MEd, MSW, ELIANA)   Occupational History     Occupation: marketing     Employer: [employer]     Comment: [employer]   Tobacco Use     Smoking status: Never     Smokeless tobacco: Never   Vaping Use     Vaping Use: Never used   Substance and Sexual Activity     Alcohol use: Yes     Comment: rare---red wine 3x per month     Drug use: Never     Sexual activity: Not Currently     Partners: Female   Other Topics Concern     Parent/sibling w/ CABG, MI or angioplasty before 65F 55M? No   Social History Narrative     Not on file     Social Determinants of Health     Financial Resource Strain: Low Risk  (10/18/2023)    Financial Resource Strain      Within the past 12 months, have you or your family members you live with been unable to get utilities (heat, electricity) when it was really needed?: No   Food Insecurity: Low Risk  (10/18/2023)    Food Insecurity      Within the past 12 months, did you worry that your food would run out before you got money to buy more?: No      Within the past 12 months, did the food you bought just not last and you didn t have money to get more?: No   Transportation Needs: Low  Risk  (10/18/2023)    Transportation Needs      Within the past 12 months, has lack of transportation kept you from medical appointments, getting your medicines, non-medical meetings or appointments, work, or from getting things that you need?: No   Physical Activity: Inactive (10/18/2023)    Exercise Vital Sign      Days of Exercise per Week: 0 days      Minutes of Exercise per Session: 50 min   Stress: No Stress Concern Present (10/18/2023)    [place] [employer] - Occupational Stress Questionnaire      Feeling of Stress : Not at all   Social Connections: Moderately Integrated (10/18/2023)    Social Connection and Isolation Panel [NHANES]      Frequency of Communication with Friends and Family: Twice a week      Frequency of Social Gatherings with Friends and Family: Never      Attends [employer] Services: More than 4 times per year      Active Member of Clubs or Organizations: Yes      Attends Club or Organization Meetings: More than 4 times per year      Marital Status:    Interpersonal Safety: Low Risk  (10/18/2023)    Interpersonal Safety      Do you feel physically and emotionally safe where you currently live?: Yes      Within the past 12 months, have you been hit, slapped, kicked or otherwise physically hurt by someone?: No      Within the past 12 months, have you been humiliated or emotionally abused in other ways by your partner or ex-partner?: No   Housing Stability: Low Risk  (10/18/2023)    Housing Stability      Do you have housing? : Yes      Are you worried about losing your housing?: No       Surgical Hx:   Past Surgical History:   Procedure Laterality Date     AMPUTATE FOOT Left 8/18/2016    Procedure: AMPUTATE FOOT;  Surgeon: Jack Younger DPM;  Location: [facility]     AMPUTATE TOE(S) Right 2/1/2016    Procedure: AMPUTATE TOE(S);  Surgeon: Rachelle Manriquez DPM, Pod;  Location: [facility]     AMPUTATE TOE(S) Right 8/2/2019    Procedure: Right fourth toe partial amputation  for treatment of osteomyelitis.;  Surgeon: Jack Younger DPM;  Location: [facility]     AMPUTATE TOE(S) Left 11/8/2019    Procedure: Left second toe amputation at the metatarsophalangeal joint.;  Surgeon: Rachelle Manriquez DPM, Podiatry/Foot and Ankle Surgery;  Location: [facility]     AMPUTATE TOE(S) Right 6/5/2022    Procedure: AMPUTATION OF RIGHT GREAT TOE;  Surgeon: Wili Quiles DPM;  Location: [facility]     AMPUTATE TOE(S) Right 7/28/2022    Procedure: Right foot partial first metatarsal resection;  Surgeon: Tiny Soto DPM;  Location: [facility]     ANGIOGRAM       BIOPSY BONE FOOT Right 7/24/2022    Procedure: Bone biopsy, right first metatarsal.;  Surgeon: Valentino Molina DPM;  Location: [facility]     COLONOSCOPY       COMBINED CYSTOSCOPY, RETROGRADES, URETEROSCOPY, INSERT STENT Left 8/21/2016    Procedure: COMBINED CYSTOSCOPY, RETROGRADES, URETEROSCOPY, INSERT STENT;  Surgeon: Artemio Valenzuela MD;  Location: [facility]     COMBINED CYSTOSCOPY, RETROGRADES, URETEROSCOPY, LASER HOLMIUM LITHOTRIPSY URETER(S), INSERT STENT Left 10/3/2016    Procedure: COMBINED CYSTOSCOPY, RETROGRADES, URETEROSCOPY, LASER HOLMIUM LITHOTRIPSY URETER(S), INSERT STENT;  Surgeon: Artemio Valenzuela MD;  Location: [facility]     EXTRACORPOREAL SHOCK WAVE LITHOTRIPSY (ESWL) Left 9/8/2016    Procedure: EXTRACORPOREAL SHOCK WAVE LITHOTRIPSY (ESWL);  Surgeon: Artemio Valenzuela MD;  Location: [facility]     INCISION AND DRAINAGE FOOT, COMBINED Right 7/24/2022    Procedure: Incision and drainage, right foot ;  Surgeon: Valentino Molina DPM;  Location: [facility]     IRRIGATION AND DEBRIDEMENT FOOT, COMBINED Left 10/19/2023    Procedure: Left foot second metatarsal resection , . Left plantar ulcer excisional debridement, down to and including tendon, with closure, site measuring 4 cm x 2 cm x 1 cm;  Surgeon: Tiny Soto DPM;  Location: [facility]     OSTEOTOMY FOOT Right 11/30/2022    Procedure: 1. Right second metatarsal floating  osteotomy  2. Right second digit proximal phalanx arthroplasty 3. Right percutaneous flexor tenotomy;  Surgeon: Tiny Soto DPM;  Location: [facility]     OSTEOTOMY FOOT Right 1/19/2023    Procedure: Right foot third metatarsal head excision, ulcer debridement, matatarsal osteotomies;  Surgeon: Tiny Soto DPM;  Location: [facility]     RECESSION GASTROCNEMIUS Right 2/1/2016    Procedure: RECESSION GASTROCNEMIUS;  Surgeon: Rachelle Manriquez DPM, Pod;  Location: [facility]       Allergies:    Allergies   Allergen Reactions     Statins Swelling     Other reaction(s): Other (see comments)  SIMCOR caused edema in extremities       Bactrim [Sulfamethoxazole-Trimethoprim] Nausea and Vomiting     Sulfa Antibiotics Nausea     Niacin Swelling     SIMCOR caused edema in extremities     Simvastatin Swelling     SIMCOR caused edema in extremities       Medications:   Current Outpatient Medications   Medication     amLODIPine (NORVASC) 5 MG tablet     amoxicillin-clavulanate (AUGMENTIN) 875-125 MG tablet     atorvastatin (LIPITOR) 40 MG tablet     blood glucose (NO BRAND SPECIFIED) lancets standard     calcium carbonate (OS-NARA) 500 MG tablet     Cholecalciferol (VITAMIN D3) 25 MCG (1000 UT) CAPS     Co-Enzyme Q10 100 MG CAPS     Continuous Blood Gluc Sensor (FREESTYLE LUCERO 14 DAY SENSOR) MISC     ELIQUIS ANTICOAGULANT 5 MG tablet     gabapentin (NEURONTIN) 100 MG capsule     hydrochlorothiazide (MICROZIDE) 12.5 MG capsule     Insulin Aspart FlexPen 100 UNIT/ML SOPN     insulin degludec (TRESIBA FLEXTOUCH) 100 UNIT/ML pen     insulin pen needle (B-D U/F) 31G X 5 MM miscellaneous     ketoconazole (NIZORAL) 2 % external shampoo     lisinopril (ZESTRIL) 40 MG tablet     MAGNESIUM GLYCINATE PLUS PO     metFORMIN (GLUCOPHAGE XR) 500 MG 24 hr tablet     Multiple Vitamins-Minerals (MULTIVITAMIN PO)     mupirocin (BACTROBAN) 2 % external ointment     Omega-3 Fatty Acids (OMEGA-3 FISH OIL PO)     omeprazole (PRILOSEC) 40 MG DR capsule      propranolol (INDERAL) 20 MG tablet     Semaglutide, 1 MG/DOSE, (OZEMPIC, 1 MG/DOSE,) 4 MG/3ML pen     tadalafil (CIALIS) 5 MG tablet     No current facility-administered medications for this visit.         Objective:  Vitals:  /70   Wt 103.9 kg (229 lb)   BMI 31.50 kg/m      A1C: 6.0 on 7/10/23     General:  Patient is alert and orientated.  NAD      Dermatologic: Right foot plantar ulcer: small superficial breakdown. Dry. Blister site dried and healed.  --> remains healed   Left foot: incision sites intact: healed and epithelialized from second metatarsal resection   New ulcer to submet 3: granular. Depth to subcutaneous tissue. Small and dry. No signs of infection. No probe to bone.     Vascular: DP & PT pulses are intact & regular bilaterally.  No significant edema or varicosities noted.  CFT and skin temperature is normal to both lower extremities.     Neurologic: Lower extremity sensation is absent      Musculoskeletal: Patient is ambulatory without assistive device. S/p several digital amputations b/l.     Imaging:    Left foot XR 9/13      IMPRESSION: Partial amputation of the first and second toes at the MTP  joints. No fracture or osteomyelitis. No degenerative or erosive  arthritic change. Hammer toe deformities.       Cultures:       Foot, Left; Tissue   0 Result Notes  Culture Isolated in broth only Finegoldia magna Abnormal    On day 5 of incubation  Susceptibilities not routinely done, refer to antibiogram to view typical susceptibility profiles   Isolated in broth only Pseudomonas aeruginosa Abnormal    On day 5 of incubation         Foot, Left; Tissue   0 Result Notes  Culture No anaerobic organisms isolated       Isolated in broth only Staphylococcus epidermidis Abnormal    On day 2 of incubation  Not isolated or reported on routine aerobic culture  Susceptibilities not routinely done, refer to antibiogram to view typical susceptibility profiles         Specimens:   A) - Foot, Left, 2nd  left proximal metatarsal                                                        B) - Foot, Left, 2nd left metatarsal head                                                     PROCEDURE:   Verbal consent was obtained for debridement. A 15 blade was used to excise the nonivable tissue to the ulcer, down to and including subcutaneous tissue. No noted purulence afterwards. Debrided site measures 1cm x 1 cm x .2 cm. No probe to bone. Well tolerated. Site was cleansed with alcohol.       Assessment:   S/p left foot second ray resection, plantar foot ulcer debridement on 10/19 --> healed   Submet 3 ulcer: new this visit      Plan:    -Discussed all findings with patient. Chart and imaging reviewed.   -Patient's surgical site is much improved and healed. There is unfortunately a new lesion submet 3 that has developed. Currently no signs of infection to site.   -Discussed with patient, given his history and significant activity, he's at high risk of worsening. He should be off of site at all times between now and his trip to allow healing of site.  Recommend using air cast boot at this time and limiting activity.     -No open lesions to right foot  -Discussed signs of infection. Should mychart message if he's on vacation and has any concerns.   -Further follow up after travel plans. Discussed dressing change plans, has supplies that were ordered from wound care. Primarily needs to be off of left foot to provide healing.       Tiny Soto DPM              Again, thank you for allowing me to participate in the care of your patient.        Sincerely,        Tiny Soto DPM

## 2023-12-07 NOTE — PROGRESS NOTES
[facility]:     Subject: Patient was seen at wound center for b/l feet. He's known to myself for a long history of foot ulcers. Previously worse on the right foot, now worse on the left foot. He's been extremely active all summer, stating he's been on his feet for 10+ hours a day, knowing the risks of this to his feet.   -He now follows up for his left foot, following second metatarsal resection on 10/19. Had an MRI that confirmed osteomyelitis and was scheduled for surgery quickly afterwards. States no pain since surgery and notices swelling to ankle is much better.  was active on his foot and noticed a lot of drainage, which has now subsided. Has completed ciprofloxcin abx, still take augmentin long term per Id for chronic osteomyelitis.   11/20: Follows up for b/l feet. Has been traveling a lot and walking a lot on his right foot. States had a blister form on his right foot, but that it dried and healed. States no drainage from right foot. States left foot also hasn't been draining other than a small site from the dorsal aspect. Denies pain. Denies N/F/V/C/D. Feels well.     12/6: Follows up for b/l feet. States feels well, but now has more drainage from left foot. Presents in custom molded orthotics/shoes. States that left foot had drainage over the past couple of days, but then none today. States no pain to site. States appears adjacent to previous problem. No problems to right foot. Has some concern because has a lot of travel plans coming up, including going to [place] over x[place] for approx 2 weeks.     PMH:   Past Medical History:   Diagnosis Date    Antiplatelet or antithrombotic long-term use     Ascending aorta dilatation (H24)     Cerebral artery occlusion with cerebral infarction (H)     Cerebral infarction (H)     2010    Gastroesophageal reflux disease with esophagitis     H/O blood clots 2022    Hyperlipidemia LDL goal <70     Hypertension     Nonalcoholic steatohepatitis  11/30/2022    Numbness and tingling     Obesity     GILA (obstructive sleep apnea) 10/22/2018    CPAP intolerant    PVD (peripheral vascular disease) (H24)     s/p left partial toe amputation    Renal stone     Tremor     Type 2 diabetes mellitus with diabetic polyneuropathy, with long-term current use of insulin (H)        Social Hx:   Social History     Socioeconomic History    Marital status:      Spouse name: Sydney    Number of children: 2    Years of education: Not on file    Highest education level: Master's degree (e.g., MA, MS, Arleth, MEd, MSW, ELIANA)   Occupational History    Occupation: marketing     Employer: [employer]     Comment: [employer]   Tobacco Use    Smoking status: Never    Smokeless tobacco: Never   Vaping Use    Vaping Use: Never used   Substance and Sexual Activity    Alcohol use: Yes     Comment: rare---red wine 3x per month    Drug use: Never    Sexual activity: Not Currently     Partners: Female   Other Topics Concern    Parent/sibling w/ CABG, MI or angioplasty before 65F 55M? No   Social History Narrative    Not on file     Social Determinants of Health     Financial Resource Strain: Low Risk  (10/18/2023)    Financial Resource Strain     Within the past 12 months, have you or your family members you live with been unable to get utilities (heat, electricity) when it was really needed?: No   Food Insecurity: Low Risk  (10/18/2023)    Food Insecurity     Within the past 12 months, did you worry that your food would run out before you got money to buy more?: No     Within the past 12 months, did the food you bought just not last and you didn t have money to get more?: No   Transportation Needs: Low Risk  (10/18/2023)    Transportation Needs     Within the past 12 months, has lack of transportation kept you from medical appointments, getting your medicines, non-medical meetings or appointments, work, or from getting things that you need?: No   Physical Activity: Inactive (10/18/2023)     Exercise Vital Sign     Days of Exercise per Week: 0 days     Minutes of Exercise per Session: 50 min   Stress: No Stress Concern Present (10/18/2023)    [employer] [place] [employer] - Occupational Stress Questionnaire     Feeling of Stress : Not at all   Social Connections: Moderately Integrated (10/18/2023)    Social Connection and Isolation Panel [NHANES]     Frequency of Communication with Friends and Family: Twice a week     Frequency of Social Gatherings with Friends and Family: Never     Attends Jainism Services: More than 4 times per year     Active Member of Clubs or Organizations: Yes     Attends Club or Organization Meetings: More than 4 times per year     Marital Status:    Interpersonal Safety: Low Risk  (10/18/2023)    Interpersonal Safety     Do you feel physically and emotionally safe where you currently live?: Yes     Within the past 12 months, have you been hit, slapped, kicked or otherwise physically hurt by someone?: No     Within the past 12 months, have you been humiliated or emotionally abused in other ways by your partner or ex-partner?: No   Housing Stability: Low Risk  (10/18/2023)    Housing Stability     Do you have housing? : Yes     Are you worried about losing your housing?: No       Surgical Hx:   Past Surgical History:   Procedure Laterality Date    AMPUTATE FOOT Left 8/18/2016    Procedure: AMPUTATE FOOT;  Surgeon: Jack Younger DPM;  Location: [facility]    AMPUTATE TOE(S) Right 2/1/2016    Procedure: AMPUTATE TOE(S);  Surgeon: Rachelle Manriquez DPM, Pod;  Location: [facility]    AMPUTATE TOE(S) Right 8/2/2019    Procedure: Right fourth toe partial amputation for treatment of osteomyelitis.;  Surgeon: Jack Younger DPM;  Location: [facility]    AMPUTATE TOE(S) Left 11/8/2019    Procedure: Left second toe amputation at the metatarsophalangeal joint.;  Surgeon: Rachelle Manriquez DPM, Podiatry/Foot and Ankle Surgery;  Location: [facility]    AMPUTATE TOE(S) Right 6/5/2022     Procedure: AMPUTATION OF RIGHT GREAT TOE;  Surgeon: Wili Quiles DPM;  Location: [facility]    AMPUTATE TOE(S) Right 7/28/2022    Procedure: Right foot partial first metatarsal resection;  Surgeon: Tiny Soto DPM;  Location: [facility]    ANGIOGRAM      BIOPSY BONE FOOT Right 7/24/2022    Procedure: Bone biopsy, right first metatarsal.;  Surgeon: Valentino Molina DPM;  Location: [facility]    COLONOSCOPY      COMBINED CYSTOSCOPY, RETROGRADES, URETEROSCOPY, INSERT STENT Left 8/21/2016    Procedure: COMBINED CYSTOSCOPY, RETROGRADES, URETEROSCOPY, INSERT STENT;  Surgeon: Artemio Valenzuela MD;  Location: [facility]    COMBINED CYSTOSCOPY, RETROGRADES, URETEROSCOPY, LASER HOLMIUM LITHOTRIPSY URETER(S), INSERT STENT Left 10/3/2016    Procedure: COMBINED CYSTOSCOPY, RETROGRADES, URETEROSCOPY, LASER HOLMIUM LITHOTRIPSY URETER(S), INSERT STENT;  Surgeon: Artemio Valenzuela MD;  Location: [facility]    EXTRACORPOREAL SHOCK WAVE LITHOTRIPSY (ESWL) Left 9/8/2016    Procedure: EXTRACORPOREAL SHOCK WAVE LITHOTRIPSY (ESWL);  Surgeon: Artemio Valenzuela MD;  Location: [facility]    INCISION AND DRAINAGE FOOT, COMBINED Right 7/24/2022    Procedure: Incision and drainage, right foot ;  Surgeon: Valentino Molina DPM;  Location: [facility]    IRRIGATION AND DEBRIDEMENT FOOT, COMBINED Left 10/19/2023    Procedure: Left foot second metatarsal resection , . Left plantar ulcer excisional debridement, down to and including tendon, with closure, site measuring 4 cm x 2 cm x 1 cm;  Surgeon: Tiny Soto DPM;  Location: [facility]    OSTEOTOMY FOOT Right 11/30/2022    Procedure: 1. Right second metatarsal floating osteotomy  2. Right second digit proximal phalanx arthroplasty 3. Right percutaneous flexor tenotomy;  Surgeon: Tiny Soto DPM;  Location: [facility]    OSTEOTOMY FOOT Right 1/19/2023    Procedure: Right foot third metatarsal head excision, ulcer debridement, matatarsal osteotomies;  Surgeon: Tiny Soto DPM;   Location: [facility]    RECESSION GASTROCNEMIUS Right 2/1/2016    Procedure: RECESSION GASTROCNEMIUS;  Surgeon: Rachelle Manriquez DPM, Pod;  Location: [facility]       Allergies:    Allergies   Allergen Reactions    Statins Swelling     Other reaction(s): Other (see comments)  SIMCOR caused edema in extremities      Bactrim [Sulfamethoxazole-Trimethoprim] Nausea and Vomiting    Sulfa Antibiotics Nausea    Niacin Swelling     SIMCOR caused edema in extremities    Simvastatin Swelling     SIMCOR caused edema in extremities       Medications:   Current Outpatient Medications   Medication    amLODIPine (NORVASC) 5 MG tablet    amoxicillin-clavulanate (AUGMENTIN) 875-125 MG tablet    atorvastatin (LIPITOR) 40 MG tablet    blood glucose (NO BRAND SPECIFIED) lancets standard    calcium carbonate (OS-NARA) 500 MG tablet    Cholecalciferol (VITAMIN D3) 25 MCG (1000 UT) CAPS    Co-Enzyme Q10 100 MG CAPS    Continuous Blood Gluc Sensor (FREESTYLE LUCERO 14 DAY SENSOR) MISC    ELIQUIS ANTICOAGULANT 5 MG tablet    gabapentin (NEURONTIN) 100 MG capsule    hydrochlorothiazide (MICROZIDE) 12.5 MG capsule    Insulin Aspart FlexPen 100 UNIT/ML SOPN    insulin degludec (TRESIBA FLEXTOUCH) 100 UNIT/ML pen    insulin pen needle (B-D U/F) 31G X 5 MM miscellaneous    ketoconazole (NIZORAL) 2 % external shampoo    lisinopril (ZESTRIL) 40 MG tablet    MAGNESIUM GLYCINATE PLUS PO    metFORMIN (GLUCOPHAGE XR) 500 MG 24 hr tablet    Multiple Vitamins-Minerals (MULTIVITAMIN PO)    mupirocin (BACTROBAN) 2 % external ointment    Omega-3 Fatty Acids (OMEGA-3 FISH OIL PO)    omeprazole (PRILOSEC) 40 MG DR capsule    propranolol (INDERAL) 20 MG tablet    Semaglutide, 1 MG/DOSE, (OZEMPIC, 1 MG/DOSE,) 4 MG/3ML pen    tadalafil (CIALIS) 5 MG tablet     No current facility-administered medications for this visit.         Objective:  Vitals:  /70   Wt 103.9 kg (229 lb)   BMI 31.50 kg/m      A1C: 6.0 on 7/10/23     General:  Patient is alert and orientated.   NAD      Dermatologic: Right foot plantar ulcer: small superficial breakdown. Dry. Blister site dried and healed.  --> remains healed   Left foot: incision sites intact: healed and epithelialized from second metatarsal resection   New ulcer to submet 3: granular. Depth to subcutaneous tissue. Small and dry. No signs of infection. No probe to bone.     Vascular: DP & PT pulses are intact & regular bilaterally.  No significant edema or varicosities noted.  CFT and skin temperature is normal to both lower extremities.     Neurologic: Lower extremity sensation is absent      Musculoskeletal: Patient is ambulatory without assistive device. S/p several digital amputations b/l.     Imaging:    Left foot XR 9/13      IMPRESSION: Partial amputation of the first and second toes at the MTP  joints. No fracture or osteomyelitis. No degenerative or erosive  arthritic change. Hammer toe deformities.       Cultures:       Foot, Left; Tissue   0 Result Notes  Culture Isolated in broth only Finegoldia magna Abnormal    On day 5 of incubation  Susceptibilities not routinely done, refer to antibiogram to view typical susceptibility profiles   Isolated in broth only Pseudomonas aeruginosa Abnormal    On day 5 of incubation         Foot, Left; Tissue   0 Result Notes  Culture No anaerobic organisms isolated       Isolated in broth only Staphylococcus epidermidis Abnormal    On day 2 of incubation  Not isolated or reported on routine aerobic culture  Susceptibilities not routinely done, refer to antibiogram to view typical susceptibility profiles         Specimens:   A) - Foot, Left, 2nd left proximal metatarsal                                                        B) - Foot, Left, 2nd left metatarsal head                                                     PROCEDURE:   Verbal consent was obtained for debridement. A 15 blade was used to excise the nonivable tissue to the ulcer, down to and including subcutaneous tissue. No noted  purulence afterwards. Debrided site measures 1cm x 1 cm x .2 cm. No probe to bone. Well tolerated. Site was cleansed with alcohol.       Assessment:   S/p left foot second ray resection, plantar foot ulcer debridement on 10/19 --> healed   Submet 3 ulcer: new this visit      Plan:    -Discussed all findings with patient. Chart and imaging reviewed.   -Patient's surgical site is much improved and healed. There is unfortunately a new lesion submet 3 that has developed. Currently no signs of infection to site.   -Discussed with patient, given his history and significant activity, he's at high risk of worsening. He should be off of site at all times between now and his trip to allow healing of site.  Recommend using air cast boot at this time and limiting activity.     -No open lesions to right foot  -Discussed signs of infection. Should mychart message if he's on vacation and has any concerns.   -Further follow up after travel plans. Discussed dressing change plans, has supplies that were ordered from wound care. Primarily needs to be off of left foot to provide healing.       Tiny Soto DPM

## 2023-12-11 ENCOUNTER — TRANSFERRED RECORDS (OUTPATIENT)
Dept: HEALTH INFORMATION MANAGEMENT | Facility: CLINIC | Age: 63
End: 2023-12-11
Payer: COMMERCIAL

## 2023-12-13 ENCOUNTER — TRANSFERRED RECORDS (OUTPATIENT)
Dept: SURGERY | Facility: CLINIC | Age: 63
End: 2023-12-13
Payer: COMMERCIAL

## 2023-12-19 ENCOUNTER — TRANSFERRED RECORDS (OUTPATIENT)
Dept: SURGERY | Facility: CLINIC | Age: 63
End: 2023-12-19
Payer: COMMERCIAL

## 2023-12-19 ENCOUNTER — MEDICAL CORRESPONDENCE (OUTPATIENT)
Dept: HEALTH INFORMATION MANAGEMENT | Facility: CLINIC | Age: 63
End: 2023-12-19
Payer: COMMERCIAL

## 2023-12-19 ENCOUNTER — TRANSFERRED RECORDS (OUTPATIENT)
Dept: HEALTH INFORMATION MANAGEMENT | Facility: CLINIC | Age: 63
End: 2023-12-19
Payer: COMMERCIAL

## 2024-01-18 DIAGNOSIS — E11.42 TYPE 2 DIABETES MELLITUS WITH PERIPHERAL NEUROPATHY (H): ICD-10-CM

## 2024-01-18 RX ORDER — FLURBIPROFEN SODIUM 0.3 MG/ML
SOLUTION/ DROPS OPHTHALMIC
Qty: 500 EACH | Refills: 0 | Status: SHIPPED | OUTPATIENT
Start: 2024-01-18 | End: 2024-02-16

## 2024-01-18 NOTE — TELEPHONE ENCOUNTER
"    Requested Prescriptions   Pending Prescriptions Disp Refills    insulin pen needle (B-D U/F) 31G X 5 MM miscellaneous 500 each 0     Sig: USE TO INJECT LANTUS TWICE DAILY AND NOVOLOG THREE TIMES DAILY AS DIRECTED       Diabetic Supplies Protocol Passed - 1/18/2024  7:50 AM        Passed - Medication is active on med list        Passed - Medication indicated for associated diagnosis        Passed - Patient is 18 years of age or older        Passed - Recent (6 mo) or future (30 days) visit within the authorizing provider's specialty     Patient had office visit in the last 6 months or has a visit in the next 30 days with authorizing provider.  See \"Patient Info\" tab in inbasket, or \"Choose Columns\" in Meds & Orders section of the refill encounter.                 "

## 2024-01-22 DIAGNOSIS — E11.42 TYPE 2 DIABETES MELLITUS WITH PERIPHERAL NEUROPATHY (H): ICD-10-CM

## 2024-01-22 NOTE — TELEPHONE ENCOUNTER
Medication Question or Refill        What medication are you calling about (include dose and sig)?: metFORMIN (GLUCOPHAGE XR) 500 MG 24 hr tablet     Preferred Pharmacy:   [id number] - [place], [place] - [address] AT [address]  [address] [address]  Phone: [phone] Fax: [phone]      Controlled Substance Agreement on file:   CSA -- Patient Level:    CSA: None found at the patient level.       Who prescribed the medication?: Shira Montanez    Do you need a refill? Yes, pt states he has enough for a few more days and is currently in [place]      Could we send this information to you in [place] or would you prefer to receive a phone call?:   Patient would prefer a phone call   Okay to leave a detailed message?: Yes at Cell number on file:    Telephone Information:   Mobile [phone]

## 2024-01-23 RX ORDER — METFORMIN HCL 500 MG
1000 TABLET, EXTENDED RELEASE 24 HR ORAL 2 TIMES DAILY
Qty: 360 TABLET | Refills: 0 | Status: SHIPPED | OUTPATIENT
Start: 2024-01-23 | End: 2024-04-26

## 2024-01-29 ENCOUNTER — LAB (OUTPATIENT)
Dept: LAB | Facility: CLINIC | Age: 64
End: 2024-01-29
Payer: COMMERCIAL

## 2024-01-29 DIAGNOSIS — E11.42 TYPE 2 DIABETES MELLITUS WITH PERIPHERAL NEUROPATHY (H): ICD-10-CM

## 2024-01-29 LAB
ANION GAP SERPL CALCULATED.3IONS-SCNC: 13 MMOL/L (ref 7–15)
BUN SERPL-MCNC: 16.7 MG/DL (ref 8–23)
CALCIUM SERPL-MCNC: 9.5 MG/DL (ref 8.8–10.2)
CHLORIDE SERPL-SCNC: 104 MMOL/L (ref 98–107)
CREAT SERPL-MCNC: 1.14 MG/DL (ref 0.67–1.17)
DEPRECATED HCO3 PLAS-SCNC: 23 MMOL/L (ref 22–29)
EGFRCR SERPLBLD CKD-EPI 2021: 73 ML/MIN/1.73M2
GLUCOSE SERPL-MCNC: 69 MG/DL (ref 70–99)
HBA1C MFR BLD: 7.9 % (ref 0–5.6)
POTASSIUM SERPL-SCNC: 4.5 MMOL/L (ref 3.4–5.3)
SODIUM SERPL-SCNC: 140 MMOL/L (ref 135–145)

## 2024-01-29 PROCEDURE — 80048 BASIC METABOLIC PNL TOTAL CA: CPT

## 2024-01-29 PROCEDURE — 36415 COLL VENOUS BLD VENIPUNCTURE: CPT

## 2024-01-29 PROCEDURE — 83036 HEMOGLOBIN GLYCOSYLATED A1C: CPT

## 2024-01-31 ENCOUNTER — OFFICE VISIT (OUTPATIENT)
Dept: PODIATRY | Facility: CLINIC | Age: 64
End: 2024-01-31
Payer: COMMERCIAL

## 2024-01-31 ENCOUNTER — OFFICE VISIT (OUTPATIENT)
Dept: FAMILY MEDICINE | Facility: CLINIC | Age: 64
End: 2024-01-31
Payer: COMMERCIAL

## 2024-01-31 VITALS
TEMPERATURE: 98.2 F | HEART RATE: 54 BPM | DIASTOLIC BLOOD PRESSURE: 66 MMHG | BODY MASS INDEX: 32.78 KG/M2 | WEIGHT: 242 LBS | OXYGEN SATURATION: 98 % | HEIGHT: 72 IN | SYSTOLIC BLOOD PRESSURE: 137 MMHG | RESPIRATION RATE: 16 BRPM

## 2024-01-31 VITALS — SYSTOLIC BLOOD PRESSURE: 132 MMHG | DIASTOLIC BLOOD PRESSURE: 70 MMHG

## 2024-01-31 DIAGNOSIS — B35.0 TINEA CAPITIS DUE TO TRICHOPHYTON: ICD-10-CM

## 2024-01-31 DIAGNOSIS — L97.522 DIABETIC ULCER OF TOE OF LEFT FOOT ASSOCIATED WITH TYPE 2 DIABETES MELLITUS, WITH FAT LAYER EXPOSED (H): Primary | ICD-10-CM

## 2024-01-31 DIAGNOSIS — I10 ESSENTIAL HYPERTENSION: ICD-10-CM

## 2024-01-31 DIAGNOSIS — K75.81 NONALCOHOLIC STEATOHEPATITIS: ICD-10-CM

## 2024-01-31 DIAGNOSIS — E78.5 HYPERLIPIDEMIA LDL GOAL <70: ICD-10-CM

## 2024-01-31 DIAGNOSIS — Z00.00 ROUTINE GENERAL MEDICAL EXAMINATION AT A HEALTH CARE FACILITY: Primary | ICD-10-CM

## 2024-01-31 DIAGNOSIS — Z23 NEED FOR INFLUENZA VACCINATION: ICD-10-CM

## 2024-01-31 DIAGNOSIS — S98.111A AMPUTATION OF RIGHT GREAT TOE (H): ICD-10-CM

## 2024-01-31 DIAGNOSIS — E11.621 DIABETIC ULCER OF LEFT FOOT ASSOCIATED WITH TYPE 2 DIABETES MELLITUS, WITH FAT LAYER EXPOSED, UNSPECIFIED PART OF FOOT (H): ICD-10-CM

## 2024-01-31 DIAGNOSIS — L97.522 DIABETIC ULCER OF LEFT FOOT ASSOCIATED WITH TYPE 2 DIABETES MELLITUS, WITH FAT LAYER EXPOSED, UNSPECIFIED PART OF FOOT (H): ICD-10-CM

## 2024-01-31 DIAGNOSIS — N52.1 ERECTILE DYSFUNCTION DUE TO DISEASES CLASSIFIED ELSEWHERE: ICD-10-CM

## 2024-01-31 DIAGNOSIS — E11.621 DIABETIC ULCER OF TOE OF LEFT FOOT ASSOCIATED WITH TYPE 2 DIABETES MELLITUS, WITH FAT LAYER EXPOSED (H): Primary | ICD-10-CM

## 2024-01-31 DIAGNOSIS — S98.112A AMPUTATION OF LEFT GREAT TOE (H): ICD-10-CM

## 2024-01-31 DIAGNOSIS — I10 BENIGN ESSENTIAL HYPERTENSION: ICD-10-CM

## 2024-01-31 DIAGNOSIS — Z12.5 SCREENING FOR PROSTATE CANCER: ICD-10-CM

## 2024-01-31 DIAGNOSIS — D50.8 IRON DEFICIENCY ANEMIA SECONDARY TO INADEQUATE DIETARY IRON INTAKE: ICD-10-CM

## 2024-01-31 LAB
ERYTHROCYTE [DISTWIDTH] IN BLOOD BY AUTOMATED COUNT: 13.9 % (ref 10–15)
ERYTHROCYTE [SEDIMENTATION RATE] IN BLOOD BY WESTERGREN METHOD: 41 MM/HR (ref 0–20)
HCT VFR BLD AUTO: 37.6 % (ref 40–53)
HGB BLD-MCNC: 11.9 G/DL (ref 13.3–17.7)
MCH RBC QN AUTO: 28.8 PG (ref 26.5–33)
MCHC RBC AUTO-ENTMCNC: 31.6 G/DL (ref 31.5–36.5)
MCV RBC AUTO: 91 FL (ref 78–100)
PLATELET # BLD AUTO: 207 10E3/UL (ref 150–450)
RBC # BLD AUTO: 4.13 10E6/UL (ref 4.4–5.9)
WBC # BLD AUTO: 6.7 10E3/UL (ref 4–11)

## 2024-01-31 PROCEDURE — 90678 RSV VACC PREF BIVALENT IM: CPT | Performed by: NURSE PRACTITIONER

## 2024-01-31 PROCEDURE — 36415 COLL VENOUS BLD VENIPUNCTURE: CPT | Performed by: NURSE PRACTITIONER

## 2024-01-31 PROCEDURE — 90682 RIV4 VACC RECOMBINANT DNA IM: CPT | Performed by: NURSE PRACTITIONER

## 2024-01-31 PROCEDURE — 82043 UR ALBUMIN QUANTITATIVE: CPT | Performed by: NURSE PRACTITIONER

## 2024-01-31 PROCEDURE — 80061 LIPID PANEL: CPT | Performed by: NURSE PRACTITIONER

## 2024-01-31 PROCEDURE — 83540 ASSAY OF IRON: CPT | Performed by: NURSE PRACTITIONER

## 2024-01-31 PROCEDURE — 83550 IRON BINDING TEST: CPT | Performed by: NURSE PRACTITIONER

## 2024-01-31 PROCEDURE — 85027 COMPLETE CBC AUTOMATED: CPT | Performed by: NURSE PRACTITIONER

## 2024-01-31 PROCEDURE — 82728 ASSAY OF FERRITIN: CPT | Performed by: NURSE PRACTITIONER

## 2024-01-31 PROCEDURE — 90472 IMMUNIZATION ADMIN EACH ADD: CPT | Performed by: NURSE PRACTITIONER

## 2024-01-31 PROCEDURE — 11042 DBRDMT SUBQ TIS 1ST 20SQCM/<: CPT | Performed by: PODIATRIST

## 2024-01-31 PROCEDURE — 99396 PREV VISIT EST AGE 40-64: CPT | Mod: 25 | Performed by: NURSE PRACTITIONER

## 2024-01-31 PROCEDURE — 90471 IMMUNIZATION ADMIN: CPT | Performed by: NURSE PRACTITIONER

## 2024-01-31 PROCEDURE — 99213 OFFICE O/P EST LOW 20 MIN: CPT | Mod: 25 | Performed by: PODIATRIST

## 2024-01-31 PROCEDURE — 99214 OFFICE O/P EST MOD 30 MIN: CPT | Mod: 25 | Performed by: NURSE PRACTITIONER

## 2024-01-31 PROCEDURE — 91320 SARSCV2 VAC 30MCG TRS-SUC IM: CPT | Performed by: NURSE PRACTITIONER

## 2024-01-31 PROCEDURE — 80053 COMPREHEN METABOLIC PANEL: CPT | Performed by: NURSE PRACTITIONER

## 2024-01-31 PROCEDURE — 90632 HEPA VACCINE ADULT IM: CPT | Performed by: NURSE PRACTITIONER

## 2024-01-31 PROCEDURE — G0103 PSA SCREENING: HCPCS | Performed by: NURSE PRACTITIONER

## 2024-01-31 PROCEDURE — 90480 ADMN SARSCOV2 VAC 1/ONLY CMP: CPT | Performed by: NURSE PRACTITIONER

## 2024-01-31 PROCEDURE — 86140 C-REACTIVE PROTEIN: CPT | Performed by: NURSE PRACTITIONER

## 2024-01-31 PROCEDURE — 82570 ASSAY OF URINE CREATININE: CPT | Performed by: NURSE PRACTITIONER

## 2024-01-31 PROCEDURE — 85652 RBC SED RATE AUTOMATED: CPT | Performed by: NURSE PRACTITIONER

## 2024-01-31 RX ORDER — HYDROCHLOROTHIAZIDE 12.5 MG/1
12.5 CAPSULE ORAL EVERY MORNING
Qty: 90 CAPSULE | Refills: 4 | Status: SHIPPED | OUTPATIENT
Start: 2024-01-31 | End: 2024-07-05

## 2024-01-31 RX ORDER — KETOCONAZOLE 20 MG/ML
SHAMPOO TOPICAL DAILY PRN
Qty: 120 ML | Refills: 1 | Status: SHIPPED | OUTPATIENT
Start: 2024-01-31 | End: 2024-07-05

## 2024-01-31 RX ORDER — TADALAFIL 5 MG/1
TABLET ORAL
Qty: 30 TABLET | Refills: 1 | Status: SHIPPED | OUTPATIENT
Start: 2024-01-31 | End: 2024-07-05

## 2024-01-31 SDOH — HEALTH STABILITY: PHYSICAL HEALTH: ON AVERAGE, HOW MANY DAYS PER WEEK DO YOU ENGAGE IN MODERATE TO STRENUOUS EXERCISE (LIKE A BRISK WALK)?: 4 DAYS

## 2024-01-31 ASSESSMENT — SOCIAL DETERMINANTS OF HEALTH (SDOH): HOW OFTEN DO YOU GET TOGETHER WITH FRIENDS OR RELATIVES?: NEVER

## 2024-01-31 NOTE — PATIENT INSTRUCTIONS
"Eating Healthy Foods: Care Instructions  With every meal, you can make healthy food choices. Try to eat a variety of fruits, vegetables, whole grains, lean proteins, and low-fat dairy products. This can help you get the right balance of nutrients, including vitamins and minerals. Small changes add up over time. You can start by adding one healthy food to your meals each day.    Try to make half your plate fruits and vegetables, one-fourth whole grains, and one-fourth lean proteins. Try including dairy with your meals.   Eat more fruits and vegetables. Try to have them with most meals and snacks.   Foods for healthy eating    Fruits    These can be fresh, frozen, canned, or dried.  Try to choose whole fruit rather than fruit juice.  Eat a variety of colors.    Vegetables    These can be fresh, frozen, canned, or dried.  Beans, peas, and lentils count too.    Whole grains    Choose whole-grain breads, cereals, and noodles.  Try brown rice.    Lean proteins    These can include lean meat, poultry, fish, and eggs.  You can also have tofu, beans, peas, lentils, nuts, and seeds.    Dairy    Try milk, yogurt, and cheese.  Choose low-fat or fat-free when you can.  If you need to, use lactose-free milk or fortified plant-based milk products, such as soy milk.    Water    Drink water when you're thirsty.  Limit sugar-sweetened drinks, including soda, fruit drinks, and sports drinks.  Where can you learn more?  Go to https://www.[employer].net/patiented  Enter T756 in the search box to learn more about \"Eating Healthy Foods: Care Instructions.\"  Current as of: February 28, 2023               Content Version: 13.8    4058-6498 [employer].   Care instructions adapted under license by your healthcare professional. If you have questions about a medical condition or this instruction, always ask your healthcare professional. [employer] disclaims any warranty or liability for your use of this " information.      Relationships for Good Health  Relationships are important for our health and happiness. Social isolation, loneliness and lack of support are bad for your health. Studies show that loneliness can harm health and limit your life span as much as high blood pressure and smoking.   Take some time to reflect on your relationships. Then answer these questions:  Are there people in your life that cause you stress or drain your energy? What can you do to set limits?  ________________________________________________________________________________________________________________________________________________________________________________________________________________________________________________________________________________________________________________________________________________  Who do you enjoy spending time with? Who can you go to for support?  ________________________________________________________________________________________________________________________________________________________________________________________________________________________________________________________________________________________________________________________________________________  What can you do to improve your relationships with others?  __________________________________________________________________________________________________________________________________________________________________________________________________________________  ______________________________________________________________________________________________________________________________  What do you like most about your relationships with  others?  ________________________________________________________________________________________________________________________________________________________________________________________________________________________________________________________________________________________________________________________________________________  My goal: ______________________________________________________________________  I will ______________________________________________________________________________________________________________________________________________________________________________________________    For informational purposes only. Not to replace the advice of your health care provider. Copyright   2018 [facility]. All rights reserved. Clinically reviewed by Bariatric Health  Team. [employer] 315647 - Rev 04/21.     A Good Support System Is Important (02:04)  Your health professional recommends that you watch this short online health video.  Learn how to connect with family, friends, and others for support with health issues.  Purpose:  Teaches how to reach out to family, friends, and groups for support when managing a health problem.  Goal:  User will learn how a good support system can improve confidence to manage health and live well.     How to watch the video    Scan the QR code   OR Visit the website    https://link.[employer].net/r/Qbxcshcg2xkbv   Current as of: June 25, 2023               Content Version: 13.8    7147-2014 [employer].   Care instructions adapted under license by your healthcare professional. If you have questions about a medical condition or this instruction, always ask your healthcare professional. [employer] disclaims any warranty or liability for your use of this information.      Preventive Care Advice   This is general advice given by our system to help you stay healthy. However, your care team may have specific advice  just for you. Please talk to your care team about your preventive care needs.  Nutrition  Eat 5 or more servings of fruits and vegetables each day.  Try wheat bread, brown rice and whole grain pasta (instead of white bread, rice, and pasta).  Get enough calcium and vitamin D. Check the label on foods and aim for 100% of the RDA (recommended daily allowance).  Lifestyle  Exercise at least 150 minutes each week  (30 minutes a day, 5 days a week).  Do muscle strengthening activities 2 days a week. These help control your weight and prevent disease.  No smoking.  Wear sunscreen to prevent skin cancer.  Have a dental exam and cleaning every 6 months.  Yearly exams  See your health care team every year to talk about:  Any changes in your health.  Any medicines your care team has prescribed.  Preventive care, family planning, and ways to prevent chronic diseases.  Shots (vaccines)   HPV shots (up to age 26), if you've never had them before.  Hepatitis B shots (up to age 59), if you've never had them before.  COVID-19 shot: Get this shot when it's due.  Flu shot: Get a flu shot every year.  Tetanus shot: Get a tetanus shot every 10 years.  Pneumococcal, hepatitis A, and RSV shots: Ask your care team if you need these based on your risk.  Shingles shot (for age 50 and up)  General health tests  Diabetes screening:  Starting at age 35, Get screened for diabetes at least every 3 years.  If you are younger than age 35, ask your care team if you should be screened for diabetes.  Cholesterol test: At age 39, start having a cholesterol test every 5 years, or more often if advised.  Bone density scan (DEXA): At age 50, ask your care team if you should have this scan for osteoporosis (brittle bones).  Hepatitis C: Get tested at least once in your life.  STIs (sexually transmitted infections)  Before age 24: Ask your care team if you should be screened for STIs.  After age 24: Get screened for STIs if you're at risk. You are at risk  for STIs (including HIV) if:  You are sexually active with more than one person.  You don't use condoms every time.  You or a partner was diagnosed with a sexually transmitted infection.  If you are at risk for HIV, ask about PrEP medicine to prevent HIV.  Get tested for HIV at least once in your life, whether you are at risk for HIV or not.  Cancer screening tests  Cervical cancer screening: If you have a cervix, begin getting regular cervical cancer screening tests starting at age 21.  Breast cancer scan (mammogram): If you've ever had breasts, begin having regular mammograms starting at age 40. This is a scan to check for breast cancer.  Colon cancer screening: It is important to start screening for colon cancer at age 45.  Have a colonoscopy test every 10 years (or more often if you're at risk) Or, ask your provider about stool tests like a FIT test every year or Cologuard test every 3 years.  To learn more about your testing options, visit:   https://www.[employer]/495182.pdf.  For help making a decision, visit:   https://bit.ly/lp27423.  Prostate cancer screening test: If you have a prostate, ask your care team if a prostate cancer screening test (PSA) at age 55 is right for you.  Lung cancer screening: If you are a current or former smoker ages 50 to 80, ask your care team if ongoing lung cancer screenings are right for you.  For informational purposes only. Not to replace the advice of your health care provider. Copyright   2023 [facility] [employer]. All rights reserved. Clinically reviewed by the [facility] Transitions Program. [employer] [id number] - REV 01/24.

## 2024-01-31 NOTE — LETTER
1/31/2024         RE: Crispin Rojas  [address] [facility] [address]        Dear Colleague,    Thank you for referring your patient, Crispin Rojas, to the [facility]. Please see a copy of my visit note below.    [facility]:     Subject: Patient was seen at wound center for b/l feet. He's known to myself for a long history of foot ulcers. Previously worse on the right foot, now worse on the left foot. He's been extremely active all summer, stating he's been on his feet for 10+ hours a day, knowing the risks of this to his feet.   -He now follows up for his left foot, following second metatarsal resection on 10/19. Had an MRI that confirmed osteomyelitis and was scheduled for surgery quickly afterwards. States no pain since surgery and notices swelling to ankle is much better. States was active on his foot and noticed a lot of drainage, which has now subsided. Has completed ciprofloxcin abx, still take augmentin long term per Id for chronic osteomyelitis.   11/20: Follows up for b/l feet. Has been traveling a lot and walking a lot on his right foot. States had a blister form on his right foot, but that it dried and healed. States no drainage from right foot. States left foot also hasn't been draining other than a small site from the dorsal aspect. Denies pain. Denies N/F/V/C/D. Feels well.     12/6: Follows up for b/l feet. States feels well, but now has more drainage from left foot. Presents in custom molded orthotics/shoes. States that left foot had drainage over the past couple of days, but then none today. States no pain to site. States appears adjacent to previous problem. No problems to right foot. Has some concern because has a lot of travel plans coming up, including going to [place] over xiao for approx 2 weeks.     1/31: Follows up for b/l feet. Had been out of town, in [place] and then [place]. Has been quite active on his feet, hiking  and walking. States has been managing his left foot ulcer and it doesn't seem to be worsening. Thinks the drainage is well controlled. Denies pain. Is hoping to get back to golfing and being more active by April. Denies N/F/V/C/D.     PMH:   Past Medical History:   Diagnosis Date     Antiplatelet or antithrombotic long-term use      Ascending aorta dilatation (H24)      Cerebral artery occlusion with cerebral infarction (H)      Cerebral infarction (H)     2010     Gastroesophageal reflux disease with esophagitis      H/O blood clots 2022     Hyperlipidemia LDL goal <70      Hypertension      Nonalcoholic steatohepatitis 11/30/2022     Numbness and tingling      Obesity      GILA (obstructive sleep apnea) 10/22/2018    CPAP intolerant     PVD (peripheral vascular disease) (H24)     s/p left partial toe amputation     Renal stone      Tremor      Type 2 diabetes mellitus with diabetic polyneuropathy, with long-term current use of insulin (H)        Social Hx:   Social History     Socioeconomic History     Marital status:      Spouse name: Sydney     Number of children: 2     Years of education: Not on file     Highest education level: Master's degree (e.g., MA, MS, [place], MEd, MSW, ELIANA)   Occupational History     Occupation: marketing     Employer: [employer]     Comment: [employer]   Tobacco Use     Smoking status: Never     Smokeless tobacco: Never   Vaping Use     Vaping Use: Never used   Substance and Sexual Activity     Alcohol use: Yes     Comment: rare---red wine 3x per month     Drug use: Never     Sexual activity: Not Currently     Partners: Female   Other Topics Concern     Parent/sibling w/ CABG, MI or angioplasty before 65F 55M? No   Social History Narrative     Not on file     Social Determinants of Health     Financial Resource Strain: Low Risk  (10/18/2023)    Financial Resource Strain      Within the past 12 months, have you or your family members you live with been unable to get utilities (heat,  electricity) when it was really needed?: No   Food Insecurity: Low Risk  (10/18/2023)    Food Insecurity      Within the past 12 months, did you worry that your food would run out before you got money to buy more?: No      Within the past 12 months, did the food you bought just not last and you didn t have money to get more?: No   Transportation Needs: Low Risk  (10/18/2023)    Transportation Needs      Within the past 12 months, has lack of transportation kept you from medical appointments, getting your medicines, non-medical meetings or appointments, work, or from getting things that you need?: No   Physical Activity: Unknown (10/18/2023)    Exercise Vital Sign      Days of Exercise per Week: 0 days      Minutes of Exercise per Session: Not on file   Recent Concern: Physical Activity - Inactive (10/18/2023)    Exercise Vital Sign      Days of Exercise per Week: 0 days      Minutes of Exercise per Session: 50 min   Stress: No Stress Concern Present (10/18/2023)    Citizen of [place] of Occupational Health - Occupational Stress Questionnaire      Feeling of Stress : Not at all   Social Connections: Moderately Integrated (10/18/2023)    Social Connection and Isolation Panel [NHANES]      Frequency of Communication with Friends and Family: Twice a week      Frequency of Social Gatherings with Friends and Family: Never      Attends Jew Services: More than 4 times per year      Active Member of Clubs or Organizations: Yes      Attends Club or Organization Meetings: More than 4 times per year      Marital Status:    Interpersonal Safety: Low Risk  (10/18/2023)    Interpersonal Safety      Do you feel physically and emotionally safe where you currently live?: Yes      Within the past 12 months, have you been hit, slapped, kicked or otherwise physically hurt by someone?: No      Within the past 12 months, have you been humiliated or emotionally abused in other ways by your partner or ex-partner?: No   Housing  Stability: Low Risk  (10/18/2023)    Housing Stability      Do you have housing? : Yes      Are you worried about losing your housing?: No       Surgical Hx:   Past Surgical History:   Procedure Laterality Date     AMPUTATE FOOT Left 8/18/2016    Procedure: AMPUTATE FOOT;  Surgeon: Jack Younger DPM;  [facility]: [facility]     AMPUTATE TOE(S) Right 2/1/2016    Procedure: AMPUTATE TOE(S);  Surgeon: Rachelle Manriquez DPM, Pod;  [facility]: [facility]     AMPUTATE TOE(S) Right 8/2/2019    Procedure: Right fourth toe partial amputation for treatment of osteomyelitis.;  Surgeon: Jack Younger DPM;  [facility]: [facility]     AMPUTATE TOE(S) Left 11/8/2019    Procedure: Left second toe amputation at the metatarsophalangeal joint.;  Surgeon: Rachelle Manriquez DPM, Podiatry/Foot and Ankle Surgery;  Location: [facility]     AMPUTATE TOE(S) Right 6/5/2022    Procedure: AMPUTATION OF RIGHT GREAT TOE;  Surgeon: Wili Quiles DPM;  Location: [facility]     AMPUTATE TOE(S) Right 7/28/2022    Procedure: Right foot partial first metatarsal resection;  Surgeon: Tiny Soto DPM;  Location: [facility]     ANGIOGRAM       BIOPSY BONE FOOT Right 7/24/2022    Procedure: Bone biopsy, right first metatarsal.;  Surgeon: Valentino Molina DPM;  Location:  OR     COLONOSCOPY       COMBINED CYSTOSCOPY, RETROGRADES, URETEROSCOPY, INSERT STENT Left 8/21/2016    Procedure: COMBINED CYSTOSCOPY, RETROGRADES, URETEROSCOPY, INSERT STENT;  Surgeon: Artemio Valenzuela MD;  Location: [facility]     COMBINED CYSTOSCOPY, RETROGRADES, URETEROSCOPY, LASER HOLMIUM LITHOTRIPSY URETER(S), INSERT STENT Left 10/3/2016    Procedure: COMBINED CYSTOSCOPY, RETROGRADES, URETEROSCOPY, LASER HOLMIUM LITHOTRIPSY URETER(S), INSERT STENT;  Surgeon: Artemio Valenzuela MD;  Location:  OR     EXTRACORPOREAL SHOCK WAVE LITHOTRIPSY (ESWL) Left 9/8/2016    Procedure: EXTRACORPOREAL SHOCK WAVE LITHOTRIPSY (ESWL);  Surgeon: Artemio Valenzuela MD;  Location:  OR      INCISION AND DRAINAGE FOOT, COMBINED Right 7/24/2022    Procedure: Incision and drainage, right foot ;  Surgeon: Valentino Molina DPM;  Location: [facility]     IRRIGATION AND DEBRIDEMENT FOOT, COMBINED Left 10/19/2023    Procedure: Left foot second metatarsal resection , . Left plantar ulcer excisional debridement, down to and including tendon, with closure, site measuring 4 cm x 2 cm x 1 cm;  Surgeon: Tiny Soto DPM;  Location: [facility]     OSTEOTOMY FOOT Right 11/30/2022    Procedure: 1. Right second metatarsal floating osteotomy  2. Right second digit proximal phalanx arthroplasty 3. Right percutaneous flexor tenotomy;  Surgeon: Tiny Soto DPM;  Location: [facility]     OSTEOTOMY FOOT Right 1/19/2023    Procedure: Right foot third metatarsal head excision, ulcer debridement, matatarsal osteotomies;  Surgeon: Tiny Soto DPM;  Location: [facility] OR     RECESSION GASTROCNEMIUS Right 2/1/2016    Procedure: RECESSION GASTROCNEMIUS;  Surgeon: Rachelle Manriquez DPM, Pod;  Location: [facility]       Allergies:    Allergies   Allergen Reactions     Statins Swelling     Other reaction(s): Other (see comments)  SIMCOR caused edema in extremities       Bactrim [Sulfamethoxazole-Trimethoprim] Nausea and Vomiting     Sulfa Antibiotics Nausea     Niacin Swelling     SIMCOR caused edema in extremities     Simvastatin Swelling     SIMCOR caused edema in extremities       Medications:   Current Outpatient Medications   Medication     amLODIPine (NORVASC) 5 MG tablet     amoxicillin-clavulanate (AUGMENTIN) 875-125 MG tablet     atorvastatin (LIPITOR) 40 MG tablet     blood glucose (NO BRAND SPECIFIED) lancets standard     calcium carbonate (OS-NARA) 500 MG tablet     Cholecalciferol (VITAMIN D3) 25 MCG (1000 UT) CAPS     Co-Enzyme Q10 100 MG CAPS     Continuous Blood Gluc Sensor (FREESTYLE LUCERO 14 DAY SENSOR) MISC     ELIQUIS ANTICOAGULANT 5 MG tablet     gabapentin (NEURONTIN) 100 MG capsule     hydrochlorothiazide  (MICROZIDE) 12.5 MG capsule     Insulin Aspart FlexPen 100 UNIT/ML SOPN     insulin degludec (TRESIBA FLEXTOUCH) 100 UNIT/ML pen     insulin pen needle (B-D U/F) 31G X 5 MM miscellaneous     ketoconazole (NIZORAL) 2 % external shampoo     lisinopril (ZESTRIL) 40 MG tablet     MAGNESIUM GLYCINATE PLUS PO     metFORMIN (GLUCOPHAGE XR) 500 MG 24 hr tablet     Multiple Vitamins-Minerals (MULTIVITAMIN PO)     mupirocin (BACTROBAN) 2 % external ointment     Omega-3 Fatty Acids (OMEGA-3 FISH OIL PO)     omeprazole (PRILOSEC) 40 MG DR capsule     propranolol (INDERAL) 20 MG tablet     Semaglutide, 1 MG/DOSE, (OZEMPIC, 1 MG/DOSE,) 4 MG/3ML pen     tadalafil (CIALIS) 5 MG tablet     No current facility-administered medications for this visit.         Objective:  Vitals:  /70     A1C: 6.0 on 7/10/23     General:  Patient is alert and orientated.  NAD      Dermatologic: Right foot plantar ulcer: remains healed   Left foot submet 3: granular. Depth to subcutaneous tissue. Slightly larger than when last seen. Measures approx 2 cm x 2 cm x .2 cm. No cellulitis, swelling or probe to bone.     Vascular: DP & PT pulses are intact & regular bilaterally.  No significant edema or varicosities noted.  CFT and skin temperature is normal to both lower extremities.     Neurologic: Lower extremity sensation is absent      Musculoskeletal: Patient is ambulatory without assistive device. S/p several digital amputations b/l.     Imaging:    Left foot XR 9/13      IMPRESSION: Partial amputation of the first and second toes at the MTP  joints. No fracture or osteomyelitis. No degenerative or erosive  arthritic change. Hammer toe deformities.       Cultures:       Foot, Left; Tissue   0 Result Notes  Culture Isolated in broth only Finegoldia magna Abnormal    On day 5 of incubation  Susceptibilities not routinely done, refer to antibiogram to view typical susceptibility profiles   Isolated in broth only Pseudomonas aeruginosa Abnormal    On  day 5 of incubation         Foot, Left; Tissue   0 Result Notes  Culture No anaerobic organisms isolated       Isolated in broth only Staphylococcus epidermidis Abnormal    On day 2 of incubation  Not isolated or reported on routine aerobic culture  Susceptibilities not routinely done, refer to antibiogram to view typical susceptibility profiles         Specimens:   A) - Foot, Left, 2nd left proximal metatarsal                                                        B) - Foot, Left, 2nd left metatarsal head                                                       PROCEDURE:   Verbal consent was obtained for debridement. A 15 blade was used to excise the nonivable tissue to the ulcer, down to and including subcutaneous tissue. No noted purulence afterwards. Debrided site measures 2 cm x 2 cm x .2 cm. No probe to bone. Well tolerated. Site was cleansed with alcohol.       Assessment:   S/p left foot second ray resection, plantar foot ulcer debridement on 10/19 --> healed   Left foot submet 4 ulcer      Plan:    -Discussed all findings with patient. Chart and imaging reviewed.   -Left foot ulcer slightly larger compared to when last seen. No signs of infection to site.   -Discussed with patient need for aggressive offloading of site in order to achieve closure prior to spring/summer, when patient hopes to golf every day.   -Option of aircast boot discussed. But better option would be TCC. He's agreeable to this and doesn't have any upcoming travel plans.   -Message sent to the [facility] to assist in scheduling. Conversation had with charge RN and plan for patient to start TCC within the next week.   -Also discussed follow up with Dr. Manriquez if needed while I'll be out. Patient states he's followed with her before.   -Debrided site above.   -All questions answered. Further follow up at [facility] as well as at podiatry clinic.     Tiny Soto DPM              Again, thank you for allowing me to participate in the care of your  patient.        Sincerely,        Tiny Soto DPM

## 2024-01-31 NOTE — PROGRESS NOTES
Preventive Care Visit  [facility]  iCndi Mcclure NP, Family Medicine  Jan 31, 2024      SUBJECTIVE:   Judie is a 63 year old, presenting for the following:  Physical (Male Physical )        1/31/2024     1:22 PM   Additional Questions   Roomed by IVAN Laguna   Accompanied by Self     HPI    Today's PHQ-2 Score:       1/31/2024     1:14 PM   PHQ-2 ( 1999 [employer])   Q1: Little interest or pleasure in doing things 0   Q2: Feeling down, depressed or hopeless 0   PHQ-2 Score 0   Q1: Little interest or pleasure in doing things Not at all   Q2: Feeling down, depressed or hopeless Not at all   PHQ-2 Score 0     He does follow with endocrinology for DM in [place] and he had a hgba1c done one day ago.     He would like to test his iron levels, he has been on iron    He has wound on his feet that never seem to heal due to DM and he would like to get some other labs done to check for infection.     Concerns about bleeding and iron deficiency, did just complete capsule endoscopy and does follow with GI.       Via the Health Maintenance questionnaire, the patient has reported the following services have been completed -Eye Exam, this information has been sent to the abstraction team.      Have you ever done Advance Care Planning? (For example, a Health Directive, POLST, or a discussion with a medical provider or your loved ones about your wishes): No, advance care planning information given to patient to review.  Patient plans to discuss their wishes with loved ones or provider.      Social History     Tobacco Use    Smoking status: Never    Smokeless tobacco: Never   Substance Use Topics    Alcohol use: Yes     Comment: rare---red wine 3x per month             1/31/2024     1:14 PM   Alcohol Use   Prescreen: >3 drinks/day or >7 drinks/week? No       Last PSA:   PSA   Date Value Ref Range Status   12/05/2016 0.31 0 - 4 ug/L Final     Comment:     Assay Method:  Chemiluminescence using Siemens  [place] analyzer     Prostate Specific Antigen Screen   Date Value Ref Range Status   01/03/2022 0.44 0.00 - 4.00 ug/L Final       Reviewed orders with patient. Reviewed health maintenance and updated orders accordingly - Yes  Lab work is in process  Labs reviewed in EPIC    Reviewed and updated as needed this visit by clinical staff   Tobacco  Allergies  Meds              Reviewed and updated as needed this visit by Provider                  Past Medical History:   Diagnosis Date    Antiplatelet or antithrombotic long-term use     Ascending aorta dilatation (H24)     Cerebral artery occlusion with cerebral infarction (H)     Cerebral infarction (H)     2010    Gastroesophageal reflux disease with esophagitis     H/O blood clots 2022    Hyperlipidemia LDL goal <70     Hypertension     Nonalcoholic steatohepatitis 11/30/2022    Numbness and tingling     Obesity     GILA (obstructive sleep apnea) 10/22/2018    CPAP intolerant    PVD (peripheral vascular disease) (H24)     s/p left partial toe amputation    Renal stone     Tremor     Type 2 diabetes mellitus with diabetic polyneuropathy, with long-term current use of insulin (H)       Past Surgical History:   Procedure Laterality Date    AMPUTATE FOOT Left 8/18/2016    Procedure: AMPUTATE FOOT;  Surgeon: Jack Younger DPM;  [facility]    AMPUTATE TOE(S) Right 2/1/2016    Procedure: AMPUTATE TOE(S);  Surgeon: Rachelle Manriquez DPM, Pod;  [facility]    AMPUTATE TOE(S) Right 8/2/2019    Procedure: Right fourth toe partial amputation for treatment of osteomyelitis.;  Surgeon: Jack Younger DPM;  [facility]: [facility]    AMPUTATE TOE(S) Left 11/8/2019    Procedure: Left second toe amputation at the metatarsophalangeal joint.;  Surgeon: Rachelle Manriquez DPM, Podiatry/Foot and Ankle Surgery;  Location: [facility]    AMPUTATE TOE(S) Right 6/5/2022    Procedure: AMPUTATION OF RIGHT GREAT TOE;  Surgeon: Wili Quiles DPM;  [facility]: [facility]    AMPUTATE TOE(S)  Right 7/28/2022    Procedure: Right foot partial first metatarsal resection;  Surgeon: Tiny Soto DPM;  Location: [facility]    ANGIOGRAM      BIOPSY BONE FOOT Right 7/24/2022    Procedure: Bone biopsy, right first metatarsal.;  Surgeon: Valentino Molina DPM;  [facility]: [facility]    COLONOSCOPY      COMBINED CYSTOSCOPY, RETROGRADES, URETEROSCOPY, INSERT STENT Left 8/21/2016    Procedure: COMBINED CYSTOSCOPY, RETROGRADES, URETEROSCOPY, INSERT STENT;  Surgeon: Artemio Valenzuela MD;  [facility]    COMBINED CYSTOSCOPY, RETROGRADES, URETEROSCOPY, LASER HOLMIUM LITHOTRIPSY URETER(S), INSERT STENT Left 10/3/2016    Procedure: COMBINED CYSTOSCOPY, RETROGRADES, URETEROSCOPY, LASER HOLMIUM LITHOTRIPSY URETER(S), INSERT STENT;  Surgeon: Artemio Valenzuela MD;  [facility]    EXTRACORPOREAL SHOCK WAVE LITHOTRIPSY (ESWL) Left 9/8/2016    Procedure: EXTRACORPOREAL SHOCK WAVE LITHOTRIPSY (ESWL);  Surgeon: Artemio Valenzuela MD;  Location: [facility]    INCISION AND DRAINAGE FOOT, COMBINED Right 7/24/2022    Procedure: Incision and drainage, right foot ;  Surgeon: Valentino Molina DPM;  [facility]: [facility]    IRRIGATION AND DEBRIDEMENT FOOT, COMBINED Left 10/19/2023    Procedure: Left foot second metatarsal resection , . Left plantar ulcer excisional debridement, down to and including tendon, with closure, site measuring 4 cm x 2 cm x 1 cm;  Surgeon: Tiny Soto DPM;  [facility]: [facility]    OSTEOTOMY FOOT Right 11/30/2022    Procedure: 1. Right second metatarsal floating osteotomy  2. Right second digit proximal phalanx arthroplasty 3. Right percutaneous flexor tenotomy;  Surgeon: Tiny Soto DPM;  [facility]    OSTEOTOMY FOOT Right 1/19/2023    Procedure: Right foot third metatarsal head excision, ulcer debridement, matatarsal osteotomies;  Surgeon: Tiny Soto DPM;  [facility]    RECESSION GASTROCNEMIUS Right 2/1/2016    Procedure: RECESSION GASTROCNEMIUS;  Surgeon: Rachelle Manriquez  "J, DPM, Pod;  Location: [facility]     Review of Systems    Review of Systems  Constitutional, HEENT, cardiovascular, pulmonary, gi and gu systems are negative, except as otherwise noted.    OBJECTIVE:   /66 (BP Location: Right arm, Patient Position: Sitting, Cuff Size: Adult Large)   Pulse 54   Temp 98.2  F (36.8  C) (Oral)   Resp 16   Ht 1.816 m (5' 11.5\")   Wt 109.8 kg (242 lb)   SpO2 98%   BMI 33.28 kg/m     Estimated body mass index is 33.28 kg/m  as calculated from the following:    Height as of this encounter: 1.816 m (5' 11.5\").    Weight as of this encounter: 109.8 kg (242 lb).  Physical Exam  GENERAL: alert and no distress  EYES: Eyes grossly normal to inspection, PERRL and conjunctivae and sclerae normal  HENT: ear canals and TM's normal, nose and mouth without ulcers or lesions  NECK: no adenopathy, no asymmetry, masses, or scars  RESP: lungs clear to auscultation - no rales, rhonchi or wheezes  CV: regular rate and rhythm, normal S1 S2, no S3 or S4, no murmur, click or rub, no peripheral edema  ABDOMEN: soft, nontender, no hepatosplenomegaly, no masses and bowel sounds normal  MS: no gross musculoskeletal defects noted, no edema  NEURO: Normal strength and tone, mentation intact and speech normal  PSYCH: mentation appears normal, affect normal/bright    Diagnostic Test Results:  Labs reviewed in Epic    ASSESSMENT/PLAN:   Tinea capitis due to trichophyton  Medication refilled  - ketoconazole (NIZORAL) 2 % external shampoo  Dispense: 120 mL; Refill: 1    Diabetic ulcer of left foot associated with type 2 diabetes mellitus, with fat layer exposed, unspecified part of foot (H)  Monitor labs  See podiatry for treatment, has referral for w\ound care  - Albumin Random Urine Quantitative with Creat Ratio  - ESR: Erythrocyte sedimentation rate  - CRP, inflammation  - Albumin Random Urine Quantitative with Creat Ratio  - ESR: Erythrocyte sedimentation rate  - CRP, inflammation    Routine general medical " "examination at a health care facility      Nonalcoholic steatohepatitis  Follows with GI, check CMP  - Comprehensive metabolic panel (BMP + Alb, Alk Phos, ALT, AST, Total. Bili, TP)  - Comprehensive metabolic panel (BMP + Alb, Alk Phos, ALT, AST, Total. Bili, TP)    Hyperlipidemia LDL goal <70  Check labs, on statin continue with treatment  - Comprehensive metabolic panel (BMP + Alb, Alk Phos, ALT, AST, Total. Bili, TP)  - Lipid panel reflex to direct LDL Fasting  - Comprehensive metabolic panel (BMP + Alb, Alk Phos, ALT, AST, Total. Bili, TP)  - Lipid panel reflex to direct LDL Fasting    Essential hypertension  Check labs, stable continue on medication  - CBC with platelets  - Comprehensive metabolic panel (BMP + Alb, Alk Phos, ALT, AST, Total. Bili, TP)  - CBC with platelets  - Comprehensive metabolic panel (BMP + Alb, Alk Phos, ALT, AST, Total. Bili, TP)    Iron deficiency anemia secondary to inadequate dietary iron intake  Check labs could consider continuing oral iron vs IV  - CBC with platelets  - Iron and iron binding capacity  - Ferritin  - CBC with platelets  - Iron and iron binding capacity  - Ferritin    Screening for prostate cancer  Check labs  - PSA, screen  - PSA, screen    Benign essential hypertension  See above  - hydrochlorothiazide (MICROZIDE) 12.5 MG capsule  Dispense: 90 capsule; Refill: 4    Erectile dysfunction due to diseases classified elsewhere  refilled  - tadalafil (CIALIS) 5 MG tablet  Dispense: 30 tablet; Refill: 1    Need for influenza vaccination  completed  - INFLUENZA VACCINE 18-64Y (FLUBLOK)      Patient has been advised of split billing requirements and indicates understanding: Yes      Counseling  Reviewed preventive health counseling, as reflected in patient instructions       Regular exercise       Healthy diet/nutrition      BMI  Estimated body mass index is 33.28 kg/m  as calculated from the following:    Height as of this encounter: 1.816 m (5' 11.5\").    Weight as of this " encounter: 109.8 kg (242 lb).   Weight management plan: Discussed healthy diet and exercise guidelines      He reports that he has never smoked. He has never used smokeless tobacco.            Signed Electronically by: Cindi Mcclure NP

## 2024-01-31 NOTE — PROGRESS NOTES
[facility]:     Subject: Patient was seen at wound center for b/l feet. He's known to myself for a long history of foot ulcers. Previously worse on the right foot, now worse on the left foot. He's been extremely active all summer, stating he's been on his feet for 10+ hours a day, knowing the risks of this to his feet.   -He now follows up for his left foot, following second metatarsal resection on 10/19. Had an MRI that confirmed osteomyelitis and was scheduled for surgery quickly afterwards. States no pain since surgery and notices swelling to ankle is much better.  was active on his foot and noticed a lot of drainage, which has now subsided. Has completed ciprofloxcin abx, still take augmentin long term per Id for chronic osteomyelitis.   11/20: Follows up for b/l feet. Has been traveling a lot and walking a lot on his right foot. States had a blister form on his right foot, but that it dried and healed. States no drainage from right foot. States left foot also hasn't been draining other than a small site from the dorsal aspect. Denies pain. Denies N/F/V/C/D. Feels well.     12/6: Follows up for b/l feet. States feels well, but now has more drainage from left foot. Presents in custom molded orthotics/shoes. States that left foot had drainage over the past couple of days, but then none today. States no pain to site. States appears adjacent to previous problem. No problems to right foot. Has some concern because has a lot of travel plans coming up, including going to [place] over [place]iao for approx 2 weeks.     1/31: Follows up for b/l feet. Had been out of town, in [place] and then [place]. Has been quite active on his feet, hiking and walking. States has been managing his left foot ulcer and it doesn't seem to be worsening. Thinks the drainage is well controlled. Denies pain. Is hoping to get back to golfing and being more active by April. Denies N/F/V/C/D.     PMH:   Past Medical  History:   Diagnosis Date    Antiplatelet or antithrombotic long-term use     Ascending aorta dilatation (H24)     Cerebral artery occlusion with cerebral infarction (H)     Cerebral infarction (H)     2010    Gastroesophageal reflux disease with esophagitis     H/O blood clots 2022    Hyperlipidemia LDL goal <70     Hypertension     Nonalcoholic steatohepatitis 11/30/2022    Numbness and tingling     Obesity     GILA (obstructive sleep apnea) 10/22/2018    CPAP intolerant    PVD (peripheral vascular disease) (H24)     s/p left partial toe amputation    Renal stone     Tremor     Type 2 diabetes mellitus with diabetic polyneuropathy, with long-term current use of insulin (H)        Social Hx:   Social History     Socioeconomic History    Marital status:      Spouse name: Sydney    Number of children: 2    Years of education: Not on file    Highest education level: Master's degree (e.g., MA, MS, [place], MEd, MSW, ELIANA)   Occupational History    Occupation: marketing     Employer: [employer]     Comment: [place]   Tobacco Use    Smoking status: Never    Smokeless tobacco: Never   Vaping Use    Vaping Use: Never used   Substance and Sexual Activity    Alcohol use: Yes     Comment: rare---red wine 3x per month    Drug use: Never    Sexual activity: Not Currently     Partners: Female   Other Topics Concern    Parent/sibling w/ CABG, MI or angioplasty before 65F 55M? No   Social History Narrative    Not on file     Social Determinants of Health     Financial Resource Strain: Low Risk  (10/18/2023)    Financial Resource Strain     Within the past 12 months, have you or your family members you live with been unable to get utilities (heat, electricity) when it was really needed?: No   Food Insecurity: Low Risk  (10/18/2023)    Food Insecurity     Within the past 12 months, did you worry that your food would run out before you got money to buy more?: No     Within the past 12 months, did the food you bought just not  last and you didn t have money to get more?: No   Transportation Needs: Low Risk  (10/18/2023)    Transportation Needs     Within the past 12 months, has lack of transportation kept you from medical appointments, getting your medicines, non-medical meetings or appointments, work, or from getting things that you need?: No   Physical Activity: Unknown (10/18/2023)    Exercise Vital Sign     Days of Exercise per Week: 0 days     Minutes of Exercise per Session: Not on file   Recent Concern: Physical Activity - Inactive (10/18/2023)    Exercise Vital Sign     Days of Exercise per Week: 0 days     Minutes of Exercise per Session: 50 min   Stress: No Stress Concern Present (10/18/2023)    [employer] - Occupational Stress Questionnaire     Feeling of Stress : Not at all   Social Connections: Moderately Integrated (10/18/2023)    Social Connection and Isolation Panel [NHANES]     Frequency of Communication with Friends and Family: Twice a week     Frequency of Social Gatherings with Friends and Family: Never     Attends Mandaeism Services: More than 4 times per year     Active Member of Clubs or Organizations: Yes     Attends Club or Organization Meetings: More than 4 times per year     Marital Status:    Interpersonal Safety: Low Risk  (10/18/2023)    Interpersonal Safety     Do you feel physically and emotionally safe where you currently live?: Yes     Within the past 12 months, have you been hit, slapped, kicked or otherwise physically hurt by someone?: No     Within the past 12 months, have you been humiliated or emotionally abused in other ways by your partner or ex-partner?: No   Housing Stability: Low Risk  (10/18/2023)    Housing Stability     Do you have housing? : Yes     Are you worried about losing your housing?: No       Surgical Hx:   Past Surgical History:   Procedure Laterality Date    AMPUTATE FOOT Left 8/18/2016    Procedure: AMPUTATE FOOT;  Surgeon: Jack Younger  SHRUTHI HOPKINS;  Location: [facility]    AMPUTATE TOE(S) Right 2/1/2016    Procedure: AMPUTATE TOE(S);  Surgeon: Rachelle Manriquez DPM, Pod;  Location: [facility]    AMPUTATE TOE(S) Right 8/2/2019    Procedure: Right fourth toe partial amputation for treatment of osteomyelitis.;  Surgeon: Jack Younger DPM;  Location: [facility]    AMPUTATE TOE(S) Left 11/8/2019    Procedure: Left second toe amputation at the metatarsophalangeal joint.;  Surgeon: Rachelle Manriquez DPM, Podiatry/Foot and Ankle Surgery;  Location: [facility]    AMPUTATE TOE(S) Right 6/5/2022    Procedure: AMPUTATION OF RIGHT GREAT TOE;  Surgeon: Wili Quiles DPM;  Location: [facility]    AMPUTATE TOE(S) Right 7/28/2022    Procedure: Right foot partial first metatarsal resection;  Surgeon: Tiny Soto DPM;  Location: [facility]    ANGIOGRAM      BIOPSY BONE FOOT Right 7/24/2022    Procedure: Bone biopsy, right first metatarsal.;  Surgeon: Valentino Molina DPM;  Location: [facility]    COLONOSCOPY      COMBINED CYSTOSCOPY, RETROGRADES, URETEROSCOPY, INSERT STENT Left 8/21/2016    Procedure: COMBINED CYSTOSCOPY, RETROGRADES, URETEROSCOPY, INSERT STENT;  Surgeon: Artemio Valenzuela MD;  Location: [facility]    COMBINED CYSTOSCOPY, RETROGRADES, URETEROSCOPY, LASER HOLMIUM LITHOTRIPSY URETER(S), INSERT STENT Left 10/3/2016    Procedure: COMBINED CYSTOSCOPY, RETROGRADES, URETEROSCOPY, LASER HOLMIUM LITHOTRIPSY URETER(S), INSERT STENT;  Surgeon: Artemio Valenzuela MD;  Location: [facility]    EXTRACORPOREAL SHOCK WAVE LITHOTRIPSY (ESWL) Left 9/8/2016    Procedure: EXTRACORPOREAL SHOCK WAVE LITHOTRIPSY (ESWL);  Surgeon: Artemio Valenzuela MD;  Location: [facility]    INCISION AND DRAINAGE FOOT, COMBINED Right 7/24/2022    Procedure: Incision and drainage, right foot ;  Surgeon: Valentino Molina DPM;  Location: [facility]    IRRIGATION AND DEBRIDEMENT FOOT, COMBINED Left 10/19/2023    Procedure: Left foot second metatarsal resection , . Left plantar ulcer excisional debridement, down  to and including tendon, with closure, site measuring 4 cm x 2 cm x 1 cm;  Surgeon: Tiny Soto DPM;  Location: [facility]    OSTEOTOMY FOOT Right 11/30/2022    Procedure: 1. Right second metatarsal floating osteotomy  2. Right second digit proximal phalanx arthroplasty 3. Right percutaneous flexor tenotomy;  Surgeon: Tiny Soto DPM;  Location: [facility]    OSTEOTOMY FOOT Right 1/19/2023    Procedure: Right foot third metatarsal head excision, ulcer debridement, matatarsal osteotomies;  Surgeon: Tiny Soto DPM;  Location: [facility]    RECESSION GASTROCNEMIUS Right 2/1/2016    Procedure: RECESSION GASTROCNEMIUS;  Surgeon: Rachelle Manriquez DPM, Pod;  Location: [facility]       Allergies:    Allergies   Allergen Reactions    Statins Swelling     Other reaction(s): Other (see comments)  SIMCOR caused edema in extremities      Bactrim [Sulfamethoxazole-Trimethoprim] Nausea and Vomiting    Sulfa Antibiotics Nausea    Niacin Swelling     SIMCOR caused edema in extremities    Simvastatin Swelling     SIMCOR caused edema in extremities       Medications:   Current Outpatient Medications   Medication    amLODIPine (NORVASC) 5 MG tablet    amoxicillin-clavulanate (AUGMENTIN) 875-125 MG tablet    atorvastatin (LIPITOR) 40 MG tablet    blood glucose (NO BRAND SPECIFIED) lancets standard    calcium carbonate (OS-NARA) 500 MG tablet    Cholecalciferol (VITAMIN D3) 25 MCG (1000 UT) CAPS    Co-Enzyme Q10 100 MG CAPS    Continuous Blood Gluc Sensor (FREESTYLE LUCERO 14 DAY SENSOR) MISC    ELIQUIS ANTICOAGULANT 5 MG tablet    gabapentin (NEURONTIN) 100 MG capsule    hydrochlorothiazide (MICROZIDE) 12.5 MG capsule    Insulin Aspart FlexPen 100 UNIT/ML SOPN    insulin degludec (TRESIBA FLEXTOUCH) 100 UNIT/ML pen    insulin pen needle (B-D U/F) 31G X 5 MM miscellaneous    ketoconazole (NIZORAL) 2 % external shampoo    lisinopril (ZESTRIL) 40 MG tablet    MAGNESIUM GLYCINATE PLUS PO    metFORMIN (GLUCOPHAGE XR) 500 MG 24 hr tablet     Multiple Vitamins-Minerals (MULTIVITAMIN PO)    mupirocin (BACTROBAN) 2 % external ointment    Omega-3 Fatty Acids (OMEGA-3 FISH OIL PO)    omeprazole (PRILOSEC) 40 MG DR capsule    propranolol (INDERAL) 20 MG tablet    Semaglutide, 1 MG/DOSE, (OZEMPIC, 1 MG/DOSE,) 4 MG/3ML pen    tadalafil (CIALIS) 5 MG tablet     No current facility-administered medications for this visit.         Objective:  Vitals:  /70     A1C: 6.0 on 7/10/23     General:  Patient is alert and orientated.  NAD      Dermatologic: Right foot plantar ulcer: remains healed   Left foot submet 3: granular. Depth to subcutaneous tissue. Slightly larger than when last seen. Measures approx 2 cm x 2 cm x .2 cm. No cellulitis, swelling or probe to bone.     Vascular: DP & PT pulses are intact & regular bilaterally.  No significant edema or varicosities noted.  CFT and skin temperature is normal to both lower extremities.     Neurologic: Lower extremity sensation is absent      Musculoskeletal: Patient is ambulatory without assistive device. S/p several digital amputations b/l.     Imaging:    Left foot XR 9/13      IMPRESSION: Partial amputation of the first and second toes at the MTP  joints. No fracture or osteomyelitis. No degenerative or erosive  arthritic change. Hammer toe deformities.       Cultures:       Foot, Left; Tissue   0 Result Notes  Culture Isolated in broth only Finegoldia magna Abnormal    On day 5 of incubation  Susceptibilities not routinely done, refer to antibiogram to view typical susceptibility profiles   Isolated in broth only Pseudomonas aeruginosa Abnormal    On day 5 of incubation         Foot, Left; Tissue   0 Result Notes  Culture No anaerobic organisms isolated       Isolated in broth only Staphylococcus epidermidis Abnormal    On day 2 of incubation  Not isolated or reported on routine aerobic culture  Susceptibilities not routinely done, refer to antibiogram to view typical susceptibility profiles          Specimens:   A) - Foot, Left, 2nd left proximal metatarsal                                                        B) - Foot, Left, 2nd left metatarsal head                                                       PROCEDURE:   Verbal consent was obtained for debridement. A 15 blade was used to excise the nonivable tissue to the ulcer, down to and including subcutaneous tissue. No noted purulence afterwards. Debrided site measures 2 cm x 2 cm x .2 cm. No probe to bone. Well tolerated. Site was cleansed with alcohol.       Assessment:   S/p left foot second ray resection, plantar foot ulcer debridement on 10/19 --> healed   Left foot submet 4 ulcer      Plan:    -Discussed all findings with patient. Chart and imaging reviewed.   -Left foot ulcer slightly larger compared to when last seen. No signs of infection to site.   -Discussed with patient need for aggressive offloading of site in order to achieve closure prior to spring/summer, when patient hopes to golf every day.   -Option of aircast boot discussed. But better option would be TCC. He's agreeable to this and doesn't have any upcoming travel plans.   -Message sent to the [facility] to assist in scheduling. Conversation had with charge RN and plan for patient to start TCC within the next week.   -Also discussed follow up with Dr. Manriquez if needed while I'll be out. Patient states he's followed with her before.   -Debrided site above.   -All questions answered. Further follow up at [facility] as well as at podiatry clinic.     Tiny Soto DPM

## 2024-02-01 ENCOUNTER — TELEPHONE (OUTPATIENT)
Dept: FAMILY MEDICINE | Facility: CLINIC | Age: 64
End: 2024-02-01

## 2024-02-01 ENCOUNTER — HOSPITAL ENCOUNTER (OUTPATIENT)
Dept: WOUND CARE | Facility: CLINIC | Age: 64
Discharge: HOME | End: 2024-02-01
Payer: COMMERCIAL

## 2024-02-01 ENCOUNTER — TRANSFERRED RECORDS (OUTPATIENT)
Dept: HEALTH INFORMATION MANAGEMENT | Facility: CLINIC | Age: 64
End: 2024-02-01

## 2024-02-01 VITALS — DIASTOLIC BLOOD PRESSURE: 66 MMHG | TEMPERATURE: 98.6 F | SYSTOLIC BLOOD PRESSURE: 145 MMHG | HEART RATE: 62 BPM

## 2024-02-01 DIAGNOSIS — L97.522 DIABETIC ULCER OF OTHER PART OF LEFT FOOT ASSOCIATED WITH DIABETES MELLITUS DUE TO UNDERLYING CONDITION, WITH FAT LAYER EXPOSED (H): Primary | ICD-10-CM

## 2024-02-01 DIAGNOSIS — E08.621 DIABETIC ULCER OF OTHER PART OF LEFT FOOT ASSOCIATED WITH DIABETES MELLITUS DUE TO UNDERLYING CONDITION, WITH FAT LAYER EXPOSED (H): Primary | ICD-10-CM

## 2024-02-01 LAB
ALBUMIN SERPL BCG-MCNC: 4.2 G/DL (ref 3.5–5.2)
ALP SERPL-CCNC: 61 U/L (ref 40–150)
ALT SERPL W P-5'-P-CCNC: 67 U/L (ref 0–70)
ANION GAP SERPL CALCULATED.3IONS-SCNC: 10 MMOL/L (ref 7–15)
AST SERPL W P-5'-P-CCNC: 57 U/L (ref 0–45)
BILIRUB SERPL-MCNC: 0.3 MG/DL
BUN SERPL-MCNC: 21.1 MG/DL (ref 8–23)
CALCIUM SERPL-MCNC: 8.9 MG/DL (ref 8.8–10.2)
CHLORIDE SERPL-SCNC: 105 MMOL/L (ref 98–107)
CHOLEST SERPL-MCNC: 191 MG/DL
CREAT SERPL-MCNC: 1.13 MG/DL (ref 0.67–1.17)
CREAT UR-MCNC: 178 MG/DL
CRP SERPL-MCNC: 9.6 MG/L
DEPRECATED HCO3 PLAS-SCNC: 24 MMOL/L (ref 22–29)
EGFRCR SERPLBLD CKD-EPI 2021: 74 ML/MIN/1.73M2
FASTING STATUS PATIENT QL REPORTED: YES
FERRITIN SERPL-MCNC: 39 NG/ML (ref 31–409)
GLUCOSE SERPL-MCNC: 69 MG/DL (ref 70–99)
HDLC SERPL-MCNC: 41 MG/DL
IRON BINDING CAPACITY (ROCHE): 364 UG/DL (ref 240–430)
IRON SATN MFR SERPL: 14 % (ref 15–46)
IRON SERPL-MCNC: 50 UG/DL (ref 61–157)
LDLC SERPL CALC-MCNC: 131 MG/DL
MICROALBUMIN UR-MCNC: 22.2 MG/L
MICROALBUMIN/CREAT UR: 12.47 MG/G CR (ref 0–17)
NONHDLC SERPL-MCNC: 150 MG/DL
POTASSIUM SERPL-SCNC: 4.4 MMOL/L (ref 3.4–5.3)
PROT SERPL-MCNC: 7.7 G/DL (ref 6.4–8.3)
PSA SERPL DL<=0.01 NG/ML-MCNC: 0.49 NG/ML (ref 0–4.5)
SODIUM SERPL-SCNC: 139 MMOL/L (ref 135–145)
TRIGL SERPL-MCNC: 96 MG/DL

## 2024-02-01 PROCEDURE — 97602 WOUND(S) CARE NON-SELECTIVE: CPT

## 2024-02-01 PROCEDURE — 99213 OFFICE O/P EST LOW 20 MIN: CPT

## 2024-02-01 NOTE — PROGRESS NOTES
[facility]    ASSESSMENT:    Diabetic ulcer of other part of left foot associated with diabetes mellitus due to underlying condition, with fat layer exposed (H)  (primary encounter diagnosis)-TCC     PLAN/DISCUSSION:   TCC casting applied  Wound care plan: fibracol followed by Hydafera blue. Dressing will be performed by facility staff See bottom of note for detailed wound care and patient instructions  Dietary recommendations discussed, see AVS       HISTORY OF PRESENT ILLNESS:   Patient was seen at wound center for b/l feet. He's known to myself for a long history of foot ulcers. Previously worse on the right foot, now worse on the left foot. He's been extremely active all summer, stating he's been on his feet for 10+ hours a day, knowing the risks of this to his feet.   -He now follows up for his left foot, following second metatarsal resection on 10/19. Had an MRI that confirmed osteomyelitis and was scheduled for surgery quickly afterwards. States no pain since surgery and notices swelling to ankle is much better. States was active on his foot and noticed a lot of drainage, which has now subsided. Has completed ciprofloxcin abx, still take augmentin long term per Id for chronic osteomyelitis.   11/20: Follows up for b/l feet. Has been traveling a lot and walking a lot on his right foot. States had a blister form on his right foot, but that it dried and healed. States no drainage from right foot. States left foot also hasn't been draining other than a small site from the dorsal aspect. Denies pain. Denies N/F/V/C/D. Feels well.      12/6: Follows up for b/l feet. States feels well, but now has more drainage from left foot. Presents in custom molded orthotics/shoes. States that left foot had drainage over the past couple of days, but then none today. States no pain to site. States appears adjacent to previous problem. No problems to right foot. Has some concern because has a lot of travel  plans coming up, including going to [place] over [employer] for approx 2 weeks.      1/31: Follows up for b/l feet. Had been out of town, in [place] and then [place]. Has been quite active on his feet, hiking and walking. States has been managing his left foot ulcer and it doesn't seem to be worsening. Thinks the drainage is well controlled. Denies pain. Is hoping to get back to golfing and being more active by April. Denies N/F/V/C/D.      Patient Active Problem List   Diagnosis    Calculus of kidney    Chronic left shoulder pain    Type 2 diabetes mellitus with peripheral neuropathy (H)    Hyperlipidemia LDL goal <70    Essential hypertension    Right bundle branch block    GILA (obstructive sleep apnea)    Diabetic ulcer of lower extremity (H)    PVD (peripheral vascular disease) (H24)    Scoliosis of thoracic spine, unspecified scoliosis type    Other sequelae following unspecified cerebrovascular disease    Cervical pain    Bilateral thoracic back pain    Osteomyelitis of great toe of right foot (H)    Acute deep vein thrombosis (DVT) of tibial vein of right lower extremity (H)    Abnormal CT of liver    Benign neoplasm of transverse colon    Duodenitis    Hemorrhoids    Polyp of colon    Acute osteomyelitis of right foot (H)    Essential tremor    Nonalcoholic steatohepatitis    Otalgia of left ear    Shoulder pain    Liver cirrhosis secondary to SNYDER (H)    Gastro-esophageal reflux disease with esophagitis    Chronic osteomyelitis of right foot (H)    Chronic foot ulcer with fat layer exposed, left (H)    Diabetic ulcer of left foot with fat layer exposed (H)       MEDICATIONS:   Current Outpatient Medications   Medication    amLODIPine (NORVASC) 5 MG tablet    amoxicillin-clavulanate (AUGMENTIN) 875-125 MG tablet    atorvastatin (LIPITOR) 40 MG tablet    blood glucose (NO BRAND SPECIFIED) lancets standard    calcium carbonate (OS-NARA) 500 MG tablet    Cholecalciferol (VITAMIN D3) 25 MCG (1000 UT) CAPS     Co-Enzyme Q10 100 MG CAPS    Continuous Blood Gluc Sensor (FREESTYLE LUCERO 14 DAY SENSOR) MISC    ELIQUIS ANTICOAGULANT 5 MG tablet    gabapentin (NEURONTIN) 100 MG capsule    hydrochlorothiazide (MICROZIDE) 12.5 MG capsule    Insulin Aspart FlexPen 100 UNIT/ML SOPN    insulin degludec (TRESIBA FLEXTOUCH) 100 UNIT/ML pen    insulin pen needle (B-D U/F) 31G X 5 MM miscellaneous    ketoconazole (NIZORAL) 2 % external shampoo    lisinopril (ZESTRIL) 40 MG tablet    MAGNESIUM GLYCINATE PLUS PO    metFORMIN (GLUCOPHAGE XR) 500 MG 24 hr tablet    Multiple Vitamins-Minerals (MULTIVITAMIN PO)    mupirocin (BACTROBAN) 2 % external ointment    Omega-3 Fatty Acids (OMEGA-3 FISH OIL PO)    omeprazole (PRILOSEC) 40 MG DR capsule    propranolol (INDERAL) 20 MG tablet    Semaglutide, 1 MG/DOSE, (OZEMPIC, 1 MG/DOSE,) 4 MG/3ML pen    tadalafil (CIALIS) 5 MG tablet     No current facility-administered medications for this encounter.       VITALS: BP (!) 145/66 (BP Location: Right arm, Patient Position: Sitting)   Pulse 62   Temp 98.6  F (37  C) (Temporal)      PHYSICAL EXAM:  GENERAL: Patient is alert and oriented and in no acute distress  INTEGUMENTARY:   Wound (used by OP [facility] only) 09/25/23 1505 Left plantar foot surgical (Active)   Thickness/Stage full thickness 02/01/24 1300   Base closed/resurfaced 02/01/24 1300   Periwound intact 02/01/24 1300   Periwound Temperature warm 02/01/24 1300   Periwound Skin Turgor soft 02/01/24 1300   Edges callused 02/01/24 1300   Length (cm) 0 10/30/23 1400   Width (cm) 0 10/30/23 1400   Depth (cm) 0 10/30/23 1400   Wound (cm^2) 0 cm^2 10/30/23 1400   Wound Volume (cm^3) 0 cm^3 10/30/23 1400   Wound healing % 100 10/30/23 1400   Drainage Characteristics/Odor serosanguineous 10/16/23 1555   Drainage Amount none 02/01/24 1300   Care, Wound non-select wound debridement performed. 10/30/23 1400       Wound (used by OP [facility] only) 10/30/23 9012 Left dorsal foot surgical (Active)   Thickness/Stage full  thickness 02/01/24 1300   Base pink;red 02/01/24 1300   Periwound intact 02/01/24 1300   Periwound Temperature warm 02/01/24 1300   Periwound Skin Turgor soft 02/01/24 1300   Edges callused 02/01/24 1300   Length (cm) 1.8 02/01/24 1300   Width (cm) 1.7 02/01/24 1300   Depth (cm) 0.5 02/01/24 1300   Wound (cm^2) 3.06 cm^2 02/01/24 1300   Wound Volume (cm^3) 1.53 cm^3 02/01/24 1300   Wound healing % -774.29 02/01/24 1300   Drainage Characteristics/Odor serosanguineous 02/01/24 1300   Drainage Amount moderate 02/01/24 1300   Care, Wound non-select wound debridement performed. 02/01/24 1300       Wound (used by OP [facility] only) 11/20/23 1519 Right plantar foot blisters (Active)       Incision/Surgical Site 07/28/22 Right Foot (Active)       Incision/Surgical Site 11/30/22 Anterior;Right Toe (Comment  which one) (Active)       Incision/Surgical Site 01/19/23 Right Toe (Comment  which one) (Active)       Incision/Surgical Site 10/19/23 Left Foot (Active)             PROCEDURES: Mechanical debridement was performed with cleansing of the wound by the nursing staff    PATIENT INSTRUCTIONS      Further instructions from your care team         Crispin Rojas      1960      A DME order for supplies has been placed to [facility], [address]. If there are any issues with your order including not receiving the order please call [facility] at [phone] option 1. They can also provide a tracking number for you if you had supplies shipped to you.     Dressing changes outside of clinic are being performed by Patient     PLAN:  Sutures removed from left dorsal and plantar foot today (11/20/23)  Ok to shower  Follow up with Dr. Soto in [place] on 12/6/2023 (Call to schedule)     Wound Dressing Change: right plantar foot   - Wash your hands with soap and water before you begin your dressing change and prepare a clean surface for dressings.  - Cleanse with mild unscented soap and water (such as Cetaphil,  Cerave or Dove)   - Apply thick, high-quality moisturizer to intact skin (such as CeraVe, Eucerin, Aquaphor or Vanicream)   - Apply Lotion with Urea to calluses to soften  - Apply Zinc barrier cream to plantar aspects under toes 2,3 and 5 if macerated  - Cover plantar wound with one 4x4 Mepilex dressing  - Apply compression socks (wear while awake, take off at night)  Change every other day and as needed for soilage.     Wound Dressing Change: left plantar foot and left dorsal foot  - Wash your hands with soap and water before you begin your dressing change and prepare a clean surface for dressings.  - Cleanse with mild unscented soap and water (such as Cetaphil, Cerave or Dove)   - Apply thick, high-quality moisturizer to intact skin (such as CeraVe, Eucerin, Aquaphor or Vanicream)   - Apply Lotion with Urea to calluses to soften  - Apply betadine to dorsal wounds  - Cover dorsal wound with one 4x4 Mepilex dressing  - Apply compression socks (wear while awake, take off at night)  Change every other day and as needed for soilage.     Compression:   Your compression is Compression socks and can be removed at night and put back on first thing in the morning.   Please remove compression dressing if toes turn blue and/or tingle and can not be relieved by raising the leg for one hour. If unable to reapply in the morning, keep compression on until next dressing change.     Walk as much as you can, as you are able. Whenever you sit raise your ankle above your hips to promote wound healing.      GRADUALLY increase activity levels over the next couple weeks.  Regular shoes with orthotics. Whenever you sit raise your ankle above your hips to promote wound healing.       Blood Sugars: Keep blood sugars below 150 and A1C below 7       Main Provider: Colette Fisher NP February 1, 2024    Call us at [phone] if you have any questions about your wounds, have redness or swelling around your wound, have a fever of 101 degrees  Fahrenheit or greater or if you have any other problems or concerns. We answer the phone Monday through Friday 8 am to 4 pm, please leave a message as we check the voicemail frequently throughout the day.     If you had a positive experience please indicate that on your patient satisfaction survey form that [facility] will be sending you.    It was a pleasure meeting with you today.  Thank you for allowing me and my team the privilege of caring for you today.  YOU are the reason we are here, and I truly hope we provided you with the excellent service you deserve.  Please let us know if there is anything else we can do for you so that we can be sure you are leaving completely satisfied with your care experience.      If you have any billing related questions please call the [facility] Business office at [phone]. The clinic staff does not handle billing related matters.    If you are scheduled to have a follow up appointment, you will receive a reminder call the day before your visit. On the appointment day please arrive 15 minutes prior to your appointment time. If you are unable to keep that appointment, please call the clinic to cancel or reschedule. If you are more than 10 minutes late or greater for your scheduled appointment time, the clinic policy is that you may be asked to reschedule.         Electronically signed by Colette Fisher CNP on February 1, 2024

## 2024-02-01 NOTE — TELEPHONE ENCOUNTER
Prior Authorization Retail Medication Request    Medication/Dose: tadalafil (CIALIS) 5 MG tablet  Diagnosis and ICD code (if different than what is on RX):    New/renewal/insurance change PA/secondary ins. PA:  Previously Tried and Failed: nONE    Rationale: Patient has been taking this medication since 2017.    Insurance     Covermymeds    Key: [id number]  Patient last name: helen  : 1960    Secondary (if applicable):  Insurance ID:      Pharmacy Information (if different than what is on RX)  Name:    Phone:    Fax:      Cr MARTÍNEZ

## 2024-02-01 NOTE — DISCHARGE INSTRUCTIONS
"Crispin Rojas      1960    A DME order was not completed because the patient is having dressing changes done at [facility]. PLEASE BRING ANY SUPPLIES THAT WERE GIVEN TO YOU WITH YOU TO YOUR NEXT VISIT.    Dressing changes outside of clinic are being performed by [facility]     PLAN:  In shower, keep leg dry with use of a cast protector (available at [email or web address] or [employer])   Continue to minimize walking/weight bearing  Keep blood sugars below 150 and A1C below 7   Whenever you sit, raise your ankle above your hips to promote wound healing  High protein diet (see below)     Wound Dressing Change: left plantar foot   - Prophase protocol  - Cleanse with mild unscented soap and water (such as Cetaphil, Cerave or Dove)   - Apply CriticAid paste to periwound skin  - Apply Atractain to skin otherwise   - Apply Fibracol, then Hydrofera Blue Ready-Transfer   - Apply Spandage size 7/8  - Apply 1/4 or 1/2 Drawtex pad, then ABD/Exudry  - Secure with roll gauze & tape  - Apply 4 inch TCC-EZ  Change weekly at [facility]    TCC-EZ Cast:   The TCC-EZ Total Contact Cast that has been placed on your foot and leg by your doctor has been proven to be an effective method of off-loading your foot. It is important that you understand potential problems with the cast so they can be avoided. Please follow the instructions below during your care.  1. Limit activity for first 24 hours after cast is applied  2. The outer boot MUST always be worn when walking.  3. Do not insert anything under the cast. Injury to your skin can be very dangerous.  4. Subsequent cast changes will take place from 1-2 times per week as instructed.  5. . If the cast is \"loose\" or \"rubbing\" or causing pain, please call us- it may need to be changed. Or if you develop a fever, chills, nausea, or vomiting the cast must be removed to check the wound  6. Keep your cast dry. If the cast gets wet, it must be changed immediately. (Use a protective cast bag when you are bathing.)  7. If the " cast is removed in the hospital or ER the cast technician must be notified there is only padding on the front over the shin, on the ankles and over the foot and toes.   8. You will need to wear a cast for 1-2 additional weeks after your wound has healed or until your skin has returned to normal thickness.  9. If the Clinic is closed proceed to the nearest emergency room or urgent care  This is a non-traditional cast and must be removed in a different manner.   10. Call clinic with any problems with your cast. Our phone number is [phone]. After hours please phone your PCP or go to the nearest ER.       Protein:  A diet high in protein is important for wound healing, we recommend getting up to 90 grams of protein per day. Taking protein shakes or bars are a good way to get extra protein in your diet.   Good sources of protein:  Pork 26g per 3 oz  Whey protein powder - 24g per scoop (on average)  Greek yogurt - 23g per 8oz   Chicken or Turkey - 23g per 3oz  Fish - 20-25g per 3oz  Beef - 18-23g per 3oz  Navy beans - 20g per cup  Cottage cheese - 14g per 1/2 cup   Lentils - 13g per 1/4 cup  Beef jerky 13g per 1oz  2% milk - 8g per cup  Peanut butter - 8g per 2 tablespoons  Eggs - 6g per egg  Mixed nuts - 6g per 2oz      Main Provider: Colette Fisher NP February 1, 2024    Call us at [phone] if you have any questions about your wounds, have redness or swelling around your wound, have a fever of 101 degrees Fahrenheit or greater or if you have any other problems or concerns. We answer the phone Monday through Friday 8 am to 4 pm, please leave a message as we check the voicemail frequently throughout the day.     If you had a positive experience please indicate that on your patient satisfaction survey form that [facility] will be sending you.    It was a pleasure meeting with you today.  Thank you for allowing me and my team the privilege of caring for you today.  YOU are the reason we are here, and I truly  hope we provided you with the excellent service you deserve.  Please let us know if there is anything else we can do for you so that we can be sure you are leaving completely satisfied with your care experience.      If you have any billing related questions please call the [facility] Business office at [phone]. The clinic staff does not handle billing related matters.    If you are scheduled to have a follow up appointment, you will receive a reminder call the day before your visit. On the appointment day please arrive 15 minutes prior to your appointment time. If you are unable to keep that appointment, please call the clinic to cancel or reschedule. If you are more than 10 minutes late or greater for your scheduled appointment time, the clinic policy is that you may be asked to reschedule.

## 2024-02-05 ENCOUNTER — HOSPITAL ENCOUNTER (OUTPATIENT)
Dept: WOUND CARE | Facility: CLINIC | Age: 64
Discharge: HOME | End: 2024-02-05
Payer: COMMERCIAL

## 2024-02-05 VITALS — TEMPERATURE: 97.7 F | SYSTOLIC BLOOD PRESSURE: 138 MMHG | DIASTOLIC BLOOD PRESSURE: 74 MMHG

## 2024-02-05 DIAGNOSIS — E11.621 DIABETIC ULCER OF OTHER PART OF LEFT FOOT ASSOCIATED WITH TYPE 2 DIABETES MELLITUS, WITH FAT LAYER EXPOSED (H): Primary | ICD-10-CM

## 2024-02-05 DIAGNOSIS — L97.522 DIABETIC ULCER OF OTHER PART OF LEFT FOOT ASSOCIATED WITH TYPE 2 DIABETES MELLITUS, WITH FAT LAYER EXPOSED (H): Primary | ICD-10-CM

## 2024-02-05 PROCEDURE — 11042 DBRDMT SUBQ TIS 1ST 20SQCM/<: CPT | Performed by: PODIATRIST

## 2024-02-05 PROCEDURE — 99214 OFFICE O/P EST MOD 30 MIN: CPT | Mod: 25 | Performed by: PODIATRIST

## 2024-02-05 RX ORDER — CIPROFLOXACIN 500 MG/1
500 TABLET, FILM COATED ORAL 2 TIMES DAILY
Qty: 14 TABLET | Refills: 0 | Status: SHIPPED | OUTPATIENT
Start: 2024-02-05 | End: 2024-02-12

## 2024-02-05 NOTE — DISCHARGE INSTRUCTIONS
"Crispin Rojas      1960    A DME order was not completed because the patient is having dressing changes done at [facility]. PLEASE BRING ANY SUPPLIES THAT WERE GIVEN TO YOU WITH YOU TO YOUR NEXT VISIT.    PLAN:  In shower, keep leg dry with use of a cast protector (available at [facility] or [facility])   Continue to minimize walking/weight bearing  Keep blood sugars below 150 and A1C below 7   Whenever you sit, raise your ankle above your hips to promote wound healing  High protein diet (see below)     Wound Dressing Change: left plantar foot   - Prophase protocol  - Cleanse with mild unscented soap and water (such as Cetaphil, Cerave or Dove)   - Apply CriticAid paste to periwound skin  - Apply Atractain to skin otherwise   - Apply Fibracol, then Hydrofera Blue Ready-Transfer   - Apply Spandage size 7/8  - Apply 1/4 or 1/2 Drawtex pad, then ABD/Exudry  - Secure with roll gauze & tape  - Apply 4 inch TCC-EZ  Change weekly at [facility]     TCC-EZ Cast:   The TCC-EZ Total Contact Cast that has been placed on your foot and leg by your doctor has been proven to be an effective method of off-loading your foot. It is important that you understand potential problems with the cast so they can be avoided. Please follow the instructions below during your care.  1. Limit activity for first 24 hours after cast is applied  2. The outer boot MUST always be worn when walking.  3. Do not insert anything under the cast. Injury to your skin can be very dangerous.  4. Subsequent cast changes will take place from 1-2 times per week as instructed.  5. If the cast is \"loose\" or \"rubbing\" or causing pain, please call us- it may need to be changed. Or if you develop a fever, chills, nausea, or vomiting the cast must be removed to check the wound  6. Keep your cast dry. If the cast gets wet, it must be changed immediately. (Use a protective cast bag when you are bathing.)  7. If the cast is removed in the hospital or ER the cast technician must be " notified there is only padding on the front over the shin, on the ankles and over the foot and toes.   8. You will need to wear a cast for 1-2 additional weeks after your wound has healed or until your skin has returned to normal thickness.  9. If the Clinic is closed proceed to the nearest emergency room or urgent care  This is a non-traditional cast and must be removed in a different manner.   10. Call clinic with any problems with your cast. Our phone number is [phone]. After hours please phone your PCP or go to the nearest ER.      Protein:  A diet high in protein is important for wound healing, we recommend getting up to 90 grams of protein per day.   Taking protein shakes or bars are a good way to get extra protein in your diet.     Good sources of protein:  Pork 26g per 3 oz  Whey protein powder - 24g per scoop (on average)  Greek yogurt - 23g per 8oz   Chicken or Turkey - 23g per 3oz  Fish - 20-25g per 3oz  Beef - 18-23g per 3oz  Navy beans - 20g per cup  Cottage cheese - 14g per 1/2 cup   Lentils - 13g per 1/4 cup  Beef jerky 13g per 1oz  2% milk - 8g per cup  Peanut butter - 8g per 2 tablespoons  Eggs - 6g per egg  Mixed nuts - 6g per 2oz        Main Provider: AIDE RiosPJENNIFER February 5, 2024    Call us at [phone] if you have any questions about your wounds, have redness or swelling around your wound, have a fever of 101 degrees Fahrenheit or greater or if you have any other problems or concerns. We answer the phone Monday through Friday 8 am to 4 pm, please leave a message as we check the voicemail frequently throughout the day.     If you had a positive experience please indicate that on your patient satisfaction survey form that [facility] will be sending you.    It was a pleasure meeting with you today.  Thank you for allowing me and my team the privilege of caring for you today.  YOU are the reason we are here, and I truly hope we provided you with the excellent service you  deserve.  Please let us know if there is anything else we can do for you so that we can be sure you are leaving completely satisfied with your care experience.      If you have any billing related questions please call the [facility] Business office at [phone]. The clinic staff does not handle billing related matters.    If you are scheduled to have a follow up appointment, you will receive a reminder call the day before your visit. On the appointment day please arrive 15 minutes prior to your appointment time. If you are unable to keep that appointment, please call the clinic to cancel or reschedule. If you are more than 10 minutes late or greater for your scheduled appointment time, the clinic policy is that you may be asked to reschedule.

## 2024-02-05 NOTE — PROGRESS NOTES
[facility] [place] Progress Note    Subject: Patient was seen at wound center for b/l feet. He's known to myself for a long history of foot ulcers. Previously worse on the right foot, now worse on the left foot. He's been extremely active all summer, stating he's been on his feet for 10+ hours a day, knowing the risks of this to his feet.   -He now follows up for his left foot, following second metatarsal resection on 10/19. Had an MRI that confirmed osteomyelitis and was scheduled for surgery quickly afterwards. States no pain since surgery and notices swelling to ankle is much better.  was active on his foot and noticed a lot of drainage, which has now subsided. Has completed ciprofloxcin abx, still take augmentin long term per Id for chronic osteomyelitis.   11/20: Follows up for b/l feet. Has been traveling a lot and walking a lot on his right foot. States had a blister form on his right foot, but that it dried and healed. States no drainage from right foot. States left foot also hasn't been draining other than a small site from the dorsal aspect. Denies pain. Denies N/F/V/C/D. Feels well.      12/6: Follows up for b/l feet. States feels well, but now has more drainage from left foot. Presents in custom molded orthotics/shoes. States that left foot had drainage over the past couple of days, but then none today. States no pain to site. States appears adjacent to previous problem. No problems to right foot. Has some concern because has a lot of travel plans coming up, including going to [place] over x[place] for approx 2 weeks.      1/31: Follows up for b/l feet. Had been out of town, in [place] and then [place]. Has been quite active on his feet, hiking and walking. States has been managing his left foot ulcer and it doesn't seem to be worsening. Thinks the drainage is well controlled. Denies pain. Is hoping to get back to golfing and being more active by April. Denies N/F/V/C/D.      2/5: Follows up for left foot. States feels well. Had cast applied last week. No known issues. Did have labs drawn at recent physical and requested sed rate and CRP to be done, these were noted to be slightly elevated. Questions whether there's infection brewing. Feels well. Denies N/F/V/C/D     PMH:   Past Medical History:   Diagnosis Date    Antiplatelet or antithrombotic long-term use     Ascending aorta dilatation (H24)     Cerebral artery occlusion with cerebral infarction (H)     Cerebral infarction (H)     2010    Gastroesophageal reflux disease with esophagitis     H/O blood clots 2022    Hyperlipidemia LDL goal <70     Hypertension     Nonalcoholic steatohepatitis 11/30/2022    Numbness and tingling     Obesity     GILA (obstructive sleep apnea) 10/22/2018    CPAP intolerant    PVD (peripheral vascular disease) (H24)     s/p left partial toe amputation    Renal stone     Tremor     Type 2 diabetes mellitus with diabetic polyneuropathy, with long-term current use of insulin (H)        Social Hx:   Social History     Socioeconomic History    Marital status:      Spouse name: Sydney    Number of children: 2    Years of education: Not on file    Highest education level: Master's degree (e.g., MA, MS, [place], MEd, MSW, ELIANA)   Occupational History    Occupation: marketing     Employer: [employer]     Comment: CLEAR   Tobacco Use    Smoking status: Never    Smokeless tobacco: Never   Vaping Use    Vaping Use: Never used   Substance and Sexual Activity    Alcohol use: Yes     Comment: rare---red wine 3x per month    Drug use: Never    Sexual activity: Not Currently     Partners: Female   Other Topics Concern    Parent/sibling w/ CABG, MI or angioplasty before 65F 55M? No   Social History Narrative    Not on file     Social Determinants of Health     Financial Resource Strain: Low Risk  (1/31/2024)    Financial Resource Strain     Within the past 12 months, have you or your family members you live with  been unable to get utilities (heat, electricity) when it was really needed?: No   Food Insecurity: Low Risk  (1/31/2024)    Food Insecurity     Within the past 12 months, did you worry that your food would run out before you got money to buy more?: No     Within the past 12 months, did the food you bought just not last and you didn t have money to get more?: No   Transportation Needs: Low Risk  (1/31/2024)    Transportation Needs     Within the past 12 months, has lack of transportation kept you from medical appointments, getting your medicines, non-medical meetings or appointments, work, or from getting things that you need?: No   Physical Activity: Unknown (1/31/2024)    Exercise Vital Sign     Days of Exercise per Week: 4 days     Minutes of Exercise per Session: Not on file   Stress: No Stress Concern Present (1/31/2024)    [employer] - Occupational Stress Questionnaire     Feeling of Stress : Not at all   Social Connections: Moderately Integrated (1/31/2024)    Social Connection and Isolation Panel [NHANES]     Frequency of Communication with Friends and Family: Twice a week     Frequency of Social Gatherings with Friends and Family: Never     Attends Jain Services: More than 4 times per year     Active Member of Clubs or Organizations: Yes     Attends Club or Organization Meetings: More than 4 times per year     Marital Status:    Interpersonal Safety: Low Risk  (1/31/2024)    Interpersonal Safety     Do you feel physically and emotionally safe where you currently live?: Yes     Within the past 12 months, have you been hit, slapped, kicked or otherwise physically hurt by someone?: No     Within the past 12 months, have you been humiliated or emotionally abused in other ways by your partner or ex-partner?: No   Housing Stability: Low Risk  (1/31/2024)    Housing Stability     Do you have housing? : Yes     Are you worried about losing your housing?: No       Surgical  Hx:   Past Surgical History:   Procedure Laterality Date    AMPUTATE FOOT Left 8/18/2016    Procedure: AMPUTATE FOOT;  Surgeon: Jack Younger DPM;  Location: [facility]    AMPUTATE TOE(S) Right 2/1/2016    Procedure: AMPUTATE TOE(S);  Surgeon: Rachelle Manriquez DPM, Pod;  Location: [facility]    AMPUTATE TOE(S) Right 8/2/2019    Procedure: Right fourth toe partial amputation for treatment of osteomyelitis.;  Surgeon: Jack Younger DPM;  Location: [facility]    AMPUTATE TOE(S) Left 11/8/2019    Procedure: Left second toe amputation at the metatarsophalangeal joint.;  Surgeon: Rachelle Manriquez DPM, Podiatry/Foot and Ankle Surgery;  Location: [facility]    AMPUTATE TOE(S) Right 6/5/2022    Procedure: AMPUTATION OF RIGHT GREAT TOE;  Surgeon: Wili Quiles DPM;  Location: [facility]    AMPUTATE TOE(S) Right 7/28/2022    Procedure: Right foot partial first metatarsal resection;  Surgeon: Tiny Soto DPM;  Location: [facility]    ANGIOGRAM      BIOPSY BONE FOOT Right 7/24/2022    Procedure: Bone biopsy, right first metatarsal.;  Surgeon: Valentino Molina DPM;  Location: [facility]    COLONOSCOPY      COMBINED CYSTOSCOPY, RETROGRADES, URETEROSCOPY, INSERT STENT Left 8/21/2016    Procedure: COMBINED CYSTOSCOPY, RETROGRADES, URETEROSCOPY, INSERT STENT;  Surgeon: Artemio Valenzuela MD;  Location: [facility]    COMBINED CYSTOSCOPY, RETROGRADES, URETEROSCOPY, LASER HOLMIUM LITHOTRIPSY URETER(S), INSERT STENT Left 10/3/2016    Procedure: COMBINED CYSTOSCOPY, RETROGRADES, URETEROSCOPY, LASER HOLMIUM LITHOTRIPSY URETER(S), INSERT STENT;  Surgeon: Artemio Valenzuela MD;  Location: [facility]    EXTRACORPOREAL SHOCK WAVE LITHOTRIPSY (ESWL) Left 9/8/2016    Procedure: EXTRACORPOREAL SHOCK WAVE LITHOTRIPSY (ESWL);  Surgeon: Artemio Valenzuela MD;  Location: [facility]    INCISION AND DRAINAGE FOOT, COMBINED Right 7/24/2022    Procedure: Incision and drainage, right foot ;  Surgeon: Valentino Molina DPM;  Location: [facility]    IRRIGATION AND  DEBRIDEMENT FOOT, COMBINED Left 10/19/2023    Procedure: Left foot second metatarsal resection , . Left plantar ulcer excisional debridement, down to and including tendon, with closure, site measuring 4 cm x 2 cm x 1 cm;  Surgeon: Tiny Soto DPM;  Location: [facility]    OSTEOTOMY FOOT Right 11/30/2022    Procedure: 1. Right second metatarsal floating osteotomy  2. Right second digit proximal phalanx arthroplasty 3. Right percutaneous flexor tenotomy;  Surgeon: Tiny Soto DPM;  Location: [facility]    OSTEOTOMY FOOT Right 1/19/2023    Procedure: Right foot third metatarsal head excision, ulcer debridement, matatarsal osteotomies;  Surgeon: Tiny Soto DPM;  Location: [facility]    RECESSION GASTROCNEMIUS Right 2/1/2016    Procedure: RECESSION GASTROCNEMIUS;  Surgeon: Rachelle Manriquez DPM, Pod;  Location: [facility]       Allergies:    Allergies   Allergen Reactions    Statins Swelling     Other reaction(s): Other (see comments)  SIMCOR caused edema in extremities      Bactrim [Sulfamethoxazole-Trimethoprim] Nausea and Vomiting    Sulfa Antibiotics Nausea    Niacin Swelling     SIMCOR caused edema in extremities    Simvastatin Swelling     SIMCOR caused edema in extremities       Medications:   Current Outpatient Medications   Medication    ciprofloxacin (CIPRO) 500 MG tablet    amLODIPine (NORVASC) 5 MG tablet    amoxicillin-clavulanate (AUGMENTIN) 875-125 MG tablet    atorvastatin (LIPITOR) 40 MG tablet    blood glucose (NO BRAND SPECIFIED) lancets standard    calcium carbonate (OS-NARA) 500 MG tablet    Cholecalciferol (VITAMIN D3) 25 MCG (1000 UT) CAPS    Co-Enzyme Q10 100 MG CAPS    Continuous Blood Gluc Sensor (FREESTYLE LUCERO 14 DAY SENSOR) MISC    ELIQUIS ANTICOAGULANT 5 MG tablet    gabapentin (NEURONTIN) 100 MG capsule    hydrochlorothiazide (MICROZIDE) 12.5 MG capsule    Insulin Aspart FlexPen 100 UNIT/ML SOPN    insulin degludec (TRESIBA FLEXTOUCH) 100 UNIT/ML pen    insulin pen needle (B-D U/F) 31G X  5 MM miscellaneous    ketoconazole (NIZORAL) 2 % external shampoo    lisinopril (ZESTRIL) 40 MG tablet    MAGNESIUM GLYCINATE PLUS PO    metFORMIN (GLUCOPHAGE XR) 500 MG 24 hr tablet    Multiple Vitamins-Minerals (MULTIVITAMIN PO)    mupirocin (BACTROBAN) 2 % external ointment    Omega-3 Fatty Acids (OMEGA-3 FISH OIL PO)    omeprazole (PRILOSEC) 40 MG DR capsule    propranolol (INDERAL) 20 MG tablet    Semaglutide, 1 MG/DOSE, (OZEMPIC, 1 MG/DOSE,) 4 MG/3ML pen    tadalafil (CIALIS) 5 MG tablet     No current facility-administered medications for this encounter.         Objective:  Vitals:  /74 (BP Location: Right arm, Patient Position: Sitting)   Temp 97.7  F (36.5  C)     .   Wound (used by OP I only) 09/25/23 1505 Left plantar foot surgical (Active)   Thickness/Stage full thickness 02/05/24 1300   Base pink 02/05/24 1300   Periwound intact 02/05/24 1300   Periwound Temperature warm 02/05/24 1300   Periwound Skin Turgor soft 02/05/24 1300   Edges callused 02/05/24 1300   Length (cm) 1.9 02/05/24 1300   Width (cm) 1.7 02/05/24 1300   Depth (cm) 0.3 02/05/24 1300   Wound (cm^2) 3.23 cm^2 02/05/24 1300   Wound Volume (cm^3) 0.97 cm^3 02/05/24 1300   Wound healing % -466.67 02/05/24 1300   Drainage Characteristics/Odor serosanguineous 02/05/24 1300   Drainage Amount moderate 02/05/24 1300   Care, Wound debrided 02/05/24 1300       Wound (used by OP I only) 11/20/23 1519 Right plantar foot blisters (Active)       Incision/Surgical Site 07/28/22 Right Foot (Active)       Incision/Surgical Site 11/30/22 Anterior;Right Toe (Comment  which one) (Active)       Incision/Surgical Site 01/19/23 Right Toe (Comment  which one) (Active)       Incision/Surgical Site 10/19/23 Left Foot (Active)       A1C: 7.9 up from previously 6.0     General:  Patient is alert and orientated.  NAD      Dermatologic: Right foot plantar ulcer: remains healed   Left foot submet 3: granular. Depth to subcutaneous tissue. No probe to bone.  Minimal periwound callus. No cellulitis. Nontender.   No noted ankle edema.      Vascular: DP & PT pulses are intact & regular bilaterally.  No significant edema or varicosities noted.  CFT and skin temperature is normal to both lower extremities.     Neurologic: Lower extremity sensation is absent      Musculoskeletal: Patient is ambulatory without assistive device. S/p several digital amputations b/l.    Imaging:    Left foot XR 9/13      IMPRESSION: Partial amputation of the first and second toes at the MTP  joints. No fracture or osteomyelitis. No degenerative or erosive  arthritic change. Hammer toe deformities.        Cultures:       Foot, Left; Tissue   0 Result Notes  Culture Isolated in broth only Finegoldia magna Abnormal    On day 5 of incubation  Susceptibilities not routinely done, refer to antibiogram to view typical susceptibility profiles   Isolated in broth only Pseudomonas aeruginosa Abnormal    On day 5 of incubation         Foot, Left; Tissue   0 Result Notes  Culture No anaerobic organisms isolated       Isolated in broth only Staphylococcus epidermidis Abnormal    On day 2 of incubation  Not isolated or reported on routine aerobic culture  Susceptibilities not routinely done, refer to antibiogram to view typical susceptibility profiles         Specimens:   A) - Foot, Left, 2nd left proximal metatarsal                                                        B) - Foot, Left, 2nd left metatarsal head        Labs reviewed:   Sed   41 from 21 (7 months ago)  CRP 9.6 from 3.87  Hba1c: 7.9 from 6.0  CBC: wbc 6.7    Assessment:   S/p right foot second ray resection, plantar foot ulcer debridement on 10/19 --> healed   Left foot submet 3 ulcer   Diabetes Mellitus --> hba1c: 7.9    Plan:    -Discussed all findings with patient. Chart and imaging reviewed.   -Left foot without obvious signs of infection. Somewhat uncertain and nonspecific for what elevated sed rate and CRP mean. WBC was noted to be normal.  Hba1c was higher than in past levels. Discussed all of these findings with patient.   -Given slight elevation, abx (ciprofloxacin) prescribed due to previous psuedomonas infections.   -Discussed close monitoring of site. If develops signs of infection, will need to image left foot.   -Debrided ulcer as below.   -New cast applied. Well tolerated.   -Follow up in 1 week      Procedure:  After verbal consent, per protocol lidocaine was applied to the wound. Excisional debridement was performed on ulcer.   #15 blade was used to debride ulcer down to and including subcutaneous tissue. Bleeding controlled with light pressure.  No drainage noted.   < 20sq cm debrided.  Dry dressing applied to foot.  Patient tolerated procedure well.    Tiny Soto DPM    Greater than 30 minutes were spent today, reviewing previous hospital records, counseling and educating the patient on the ongoing treatment plan and coordinating care.                 Further instructions from your care team         Crispin Rojas      1960    A DME order was not completed because the patient is having dressing changes done at [facility]. PLEASE BRING ANY SUPPLIES THAT WERE GIVEN TO YOU WITH YOU TO YOUR NEXT VISIT.    PLAN:  In shower, keep leg dry with use of a cast protector (available at [facility] or [employer])   Continue to minimize walking/weight bearing  Keep blood sugars below 150 and A1C below 7   Whenever you sit, raise your ankle above your hips to promote wound healing  High protein diet (see below)     Wound Dressing Change: left plantar foot   - Prophase protocol  - Cleanse with mild unscented soap and water (such as Cetaphil, Cerave or Dove)   - Apply CriticAid paste to periwound skin  - Apply Atractain to skin otherwise   - Apply Fibracol, then Hydrofera Blue Ready-Transfer   - Apply Spandage size 7/8  - Apply 1/4 or 1/2 Drawtex pad, then ABD/Exudry  - Secure with roll gauze & tape  - Apply 4 inch TCC-EZ  Change weekly at [facility]     TCC-EZ  "Cast:   The TCC-EZ Total Contact Cast that has been placed on your foot and leg by your doctor has been proven to be an effective method of off-loading your foot. It is important that you understand potential problems with the cast so they can be avoided. Please follow the instructions below during your care.  1. Limit activity for first 24 hours after cast is applied  2. The outer boot MUST always be worn when walking.  3. Do not insert anything under the cast. Injury to your skin can be very dangerous.  4. Subsequent cast changes will take place from 1-2 times per week as instructed.  5. If the cast is \"loose\" or \"rubbing\" or causing pain, please call us- it may need to be changed. Or if you develop a fever, chills, nausea, or vomiting the cast must be removed to check the wound  6. Keep your cast dry. If the cast gets wet, it must be changed immediately. (Use a protective cast bag when you are bathing.)  7. If the cast is removed in the hospital or ER the cast technician must be notified there is only padding on the front over the shin, on the ankles and over the foot and toes.   8. You will need to wear a cast for 1-2 additional weeks after your wound has healed or until your skin has returned to normal thickness.  9. If the Clinic is closed proceed to the nearest emergency room or urgent care  This is a non-traditional cast and must be removed in a different manner.   10. Call clinic with any problems with your cast. Our phone number is [phone]. After hours please phone your PCP or go to the nearest ER.      Protein:  A diet high in protein is important for wound healing, we recommend getting up to 90 grams of protein per day.   Taking protein shakes or bars are a good way to get extra protein in your diet.     Good sources of protein:  Pork 26g per 3 oz  Whey protein powder - 24g per scoop (on average)  Greek yogurt - 23g per 8oz   Chicken or Turkey - 23g per 3oz  Fish - 20-25g per 3oz  Beef - 18-23g per " 3oz  Navy beans - 20g per cup  Cottage cheese - 14g per 1/2 cup   Lentils - 13g per 1/4 cup  Beef jerky 13g per 1oz  2% milk - 8g per cup  Peanut butter - 8g per 2 tablespoons  Eggs - 6g per egg  Mixed nuts - 6g per 2oz        Main Provider: Tiny Soto D.P.M. February 5, 2024    Call us at [phone] if you have any questions about your wounds, have redness or swelling around your wound, have a fever of 101 degrees Fahrenheit or greater or if you have any other problems or concerns. We answer the phone Monday through Friday 8 am to 4 pm, please leave a message as we check the voicemail frequently throughout the day.     If you had a positive experience please indicate that on your patient satisfaction survey form that [facility] will be sending you.    It was a pleasure meeting with you today.  Thank you for allowing me and my team the privilege of caring for you today.  YOU are the reason we are here, and I truly hope we provided you with the excellent service you deserve.  Please let us know if there is anything else we can do for you so that we can be sure you are leaving completely satisfied with your care experience.      If you have any billing related questions please call the [facility] Business office at [phone]. The clinic staff does not handle billing related matters.    If you are scheduled to have a follow up appointment, you will receive a reminder call the day before your visit. On the appointment day please arrive 15 minutes prior to your appointment time. If you are unable to keep that appointment, please call the clinic to cancel or reschedule. If you are more than 10 minutes late or greater for your scheduled appointment time, the clinic policy is that you may be asked to reschedule.

## 2024-02-12 ENCOUNTER — HOSPITAL ENCOUNTER (OUTPATIENT)
Dept: WOUND CARE | Facility: CLINIC | Age: 64
Discharge: HOME | End: 2024-02-12
Payer: COMMERCIAL

## 2024-02-12 ENCOUNTER — OFFICE VISIT (OUTPATIENT)
Dept: OTOLARYNGOLOGY | Facility: CLINIC | Age: 64
End: 2024-02-12
Payer: COMMERCIAL

## 2024-02-12 ENCOUNTER — HOSPITAL ENCOUNTER (OUTPATIENT)
Dept: ULTRASOUND IMAGING | Facility: CLINIC | Age: 64
Discharge: HOME | End: 2024-02-12
Attending: INTERNAL MEDICINE | Admitting: INTERNAL MEDICINE
Payer: COMMERCIAL

## 2024-02-12 VITALS — HEART RATE: 59 BPM | SYSTOLIC BLOOD PRESSURE: 146 MMHG | TEMPERATURE: 98.4 F | DIASTOLIC BLOOD PRESSURE: 75 MMHG

## 2024-02-12 VITALS
HEART RATE: 61 BPM | BODY MASS INDEX: 35.34 KG/M2 | HEIGHT: 72 IN | SYSTOLIC BLOOD PRESSURE: 142 MMHG | DIASTOLIC BLOOD PRESSURE: 72 MMHG | WEIGHT: 260.9 LBS | OXYGEN SATURATION: 95 %

## 2024-02-12 DIAGNOSIS — E11.621 DIABETIC ULCER OF OTHER PART OF LEFT FOOT ASSOCIATED WITH TYPE 2 DIABETES MELLITUS, WITH FAT LAYER EXPOSED (H): Primary | ICD-10-CM

## 2024-02-12 DIAGNOSIS — K75.81 LIVER CIRRHOSIS SECONDARY TO NASH (H): ICD-10-CM

## 2024-02-12 DIAGNOSIS — J34.89 NASAL CRUSTING: Primary | ICD-10-CM

## 2024-02-12 DIAGNOSIS — K74.60 LIVER CIRRHOSIS SECONDARY TO NASH (H): ICD-10-CM

## 2024-02-12 DIAGNOSIS — L97.522 DIABETIC ULCER OF OTHER PART OF LEFT FOOT ASSOCIATED WITH TYPE 2 DIABETES MELLITUS, WITH FAT LAYER EXPOSED (H): Primary | ICD-10-CM

## 2024-02-12 DIAGNOSIS — D50.9 IRON DEFICIENCY ANEMIA, UNSPECIFIED: ICD-10-CM

## 2024-02-12 PROCEDURE — 11042 DBRDMT SUBQ TIS 1ST 20SQCM/<: CPT | Performed by: PODIATRIST

## 2024-02-12 PROCEDURE — 29445 APPL RIGID TOT CNTC LEG CAST: CPT | Mod: 51 | Performed by: PODIATRIST

## 2024-02-12 PROCEDURE — 99213 OFFICE O/P EST LOW 20 MIN: CPT | Mod: 25 | Performed by: PODIATRIST

## 2024-02-12 PROCEDURE — 99213 OFFICE O/P EST LOW 20 MIN: CPT | Performed by: OTOLARYNGOLOGY

## 2024-02-12 PROCEDURE — 76705 ECHO EXAM OF ABDOMEN: CPT

## 2024-02-12 PROCEDURE — 29515 APPLICATION SHORT LEG SPLINT: CPT | Mod: 59,LT | Performed by: PODIATRIST

## 2024-02-12 PROCEDURE — 29445 APPL RIGID TOT CNTC LEG CAST: CPT | Performed by: PODIATRIST

## 2024-02-12 ASSESSMENT — PAIN SCALES - GENERAL: PAINLEVEL: NO PAIN (0)

## 2024-02-12 NOTE — LETTER
2/12/2024       RE: Crispin Rojas  [address] [facility] [address]     Dear Colleague,    Thank you for referring your patient, Crispin Rojas, to the [facility] [place] [facility]. Please see a copy of my visit note below.    HISTORY OF PRESENT ILLNESS:   Crispin Rojas is a 63 year old year old male who presents today for left caudal septal irritation causing crusting and dryness. He reports that frequent nose blowing as a result of his symptoms are very bothersome. He also recalls a recent episode of epistaxis.     PHYSICAL EXAMINATION:  In no acute distress. Normal mood, normal affect, alert, and appropriate. Head is normocephalic. Cranial nerve VII is House-Backmann I out of VI bilaterally. Breathing without difficulty or stridor. Eyes are anicteric.     Anterior nasal exam demonstrates excoriation and irritation of the left caudal septum consistent with previous exam.      ASSESSMENT/PLAN:   Crispin Rojas is a 63 year old year old male who presents today for left caudal septal irritation causing crusting and dryness. We discussed options of watchful waiting versus excision of septal mucosa.  Will plan on proceeding with removal of this area of crusting and substantial hydration.  Follow-up to have that procedure performed in clinic.    FOLLOW UP: 6 weeks    Scribe Disclosure:   I, Leidy Simon, am serving as a scribe; to document services personally performed by Nikos Armstrong MD -based on data collection and the provider's statements to me.     Provider Disclosure:  I agree with above History, Review of Systems, Physical exam and Plan.  I have reviewed the content of the documentation and have edited it as needed. I have personally performed the services documented here and the documentation accurately represents those services and the decisions I have made.      Electronically signed by:  Nikos Armstrong  MD        Again, thank you for allowing me to participate in the care of your patient.      Sincerely,    Nikos Armstrong MD

## 2024-02-12 NOTE — PATIENT INSTRUCTIONS
You were seen in the ENT Clinic today by Dr. Armstrong. If you have any questions or concerns after your appointment, please contact us (see below)      2.   Please return to clinic in 6 weeks.         How to Contact Us:  Send a [employer] message to your provider. Our team will respond to you via [employer]. Occasionally, we will need to call you to get further information.  For urgent matters (Monday-Friday), call the ENT Clinic: [phone] and speak with a call center team member - they will route your call appropriately.   If you'd like to speak directly with a nurse, please find our contact information below. We do our best to check voicemail frequently throughout the day, and will work to call you back within 1-2 days. For urgent matters, please use the general clinic phone numbers listed above.      Adelina ORTIZ RN  ENT RN Care Coordinator  Direct: [phone]  Naye MCKINLEY LPN  Direct: [phone]

## 2024-02-12 NOTE — DISCHARGE INSTRUCTIONS
"Crispin Rojas      1960    A DME order was not completed because the patient is having dressing changes done at [facility]. PLEASE BRING ANY  SUPPLIES THAT WERE GIVEN TO YOU WITH YOU TO YOUR NEXT VISIT.    PLAN:  Continue to wear cast protector in shower  Continue to minimize walking/weight bearing  Keep blood sugars below 150 and A1C below 7  Whenever you sit, raise your ankle above your hips to promote wound healing      Wound Dressing Change: left plantar foot  - Prophase protocol  - Cleanse with mild unscented soap and water (such as Cetaphil, Cerave or Dove)  - Apply CriticAid paste to periwound skin  - Apply Atractain to skin otherwise  - Apply Fibracol, then Hydrofera Blue Ready-Transfer  - Apply Spandage size 7/8  - Apply 1/4 or 1/2 Drawtex pad  - Secure with roll gauze & tape  - Apply TCC-EZ  Change weekly at [facility]      TCC-EZ Cast:  The TCC-EZ Total Contact Cast that has been placed on your foot and leg by your  doctor has been proven to be an effective method of off-loading your foot. It is  important that you understand potential problems with the cast so they can be  avoided. Please follow the instructions below during your care.  1. Limit activity for first 24 hours after cast is applied  2. The outer boot MUST always be worn when walking.  3. Do not insert anything under the cast. Injury to your skin can be very dangerous.  4. Subsequent cast changes will take place from 1-2 times per week as instructed.  5. If the cast is \"loose\" or \"rubbing\" or causing pain, please call us- it may need to be  changed. Or if you develop a fever, chills, nausea, or vomiting the cast must be  removed to check the wound  6. Keep your cast dry. If the cast gets wet, it must be changed immediately. (Use a  protective cast bag when you are bathing.)  7. If the cast is removed in the hospital or ER the cast technician must be notified  there is only padding on the front over the shin, on the ankles and over the foot " and  toes.  8. You will need to wear a cast for 1-2 additional weeks after your wound has healed  or until your skin has returned to normal thickness.  9. If the Clinic is closed proceed to the nearest emergency room or urgent care  This is a non-traditional cast and must be removed in a different manner.  10. Call clinic with any problems with your cast. Our phone number is [phone].  After hours please phone your PCP or go to the nearest ER.      Protein:  A diet high in protein is important for wound healing, we recommend getting up to 90 grams of protein per day.  Taking protein shakes or bars are a good way to get extra protein in your diet.  Good sources of protein:  Pork 26g per 3 oz  Whey protein powder - 24g per scoop (on average)  Greek yogurt - 23g per 8oz  Chicken or Turkey - 23g per 3oz  Fish - 20-25g per 3oz  Beef - 18-23g per 3oz  Navy beans - 20g per cup  Cottage cheese - 14g per 1/2 cup  Lentils - 13g per 1/4 cup  Beef jerky 13g per 1oz  2% milk - 8g per cup  Peanut butter - 8g per 2 tablespoons  Eggs - 6g per egg  Mixed nuts - 6g per 2oz      Main Provider: Tiny Soto D.P.M. February 12, 2024    Call us at [phone] if you have any questions about your wounds, have redness or swelling around your wound, have a fever of 101 degrees Fahrenheit or greater or if you have any other problems or concerns. We answer the phone Monday through Friday 8 am to 4 pm, please leave a message as we check the voicemail frequently throughout the day.     If you had a positive experience please indicate that on your patient satisfaction survey form that [facility] will be sending you.    It was a pleasure meeting with you today.  Thank you for allowing me and my team the privilege of caring for you today.  YOU are the reason we are here, and I truly hope we provided you with the excellent service you deserve.  Please let us know if there is anything else we can do for you so that we can be sure you  are leaving completely satisfied with your care experience.      If you have any billing related questions please call the [facility] Business office at [phone]. The clinic staff does not handle billing related matters.    If you are scheduled to have a follow up appointment, you will receive a reminder call the day before your visit. On the appointment day please arrive 15 minutes prior to your appointment time. If you are unable to keep that appointment, please call the clinic to cancel or reschedule. If you are more than 10 minutes late or greater for your scheduled appointment time, the clinic policy is that you may be asked to reschedule.

## 2024-02-12 NOTE — NURSING NOTE
Chief Complaint   Patient presents with    RECHECK   Blood pressure (!) 142/72, pulse 61, height 1.829 m (6'), weight 118.3 kg (260 lb 14.4 oz), SpO2 95%. Moises Shook, EMT

## 2024-02-12 NOTE — PROGRESS NOTES
HISTORY OF PRESENT ILLNESS:   Crispin Rojas is a 63 year old year old male who presents today for left caudal septal irritation causing crusting and dryness. He reports that frequent nose blowing as a result of his symptoms are very bothersome. He also recalls a recent episode of epistaxis.     PHYSICAL EXAMINATION:  In no acute distress. Normal mood, normal affect, alert, and appropriate. Head is normocephalic. Cranial nerve VII is House-Backmann I out of VI bilaterally. Breathing without difficulty or stridor. Eyes are anicteric.     Anterior nasal exam demonstrates excoriation and irritation of the left caudal septum consistent with previous exam.      ASSESSMENT/PLAN:   Crispin Rojas is a 63 year old year old male who presents today for left caudal septal irritation causing crusting and dryness. We discussed options of watchful waiting versus excision of septal mucosa.  Will plan on proceeding with removal of this area of crusting and substantial hydration.  Follow-up to have that procedure performed in clinic.    FOLLOW UP: 6 weeks    Scribe Disclosure:   I, Leidy Simon, am serving as a scribe; to document services personally performed by Nikos Armstrong MD -based on data collection and the provider's statements to me.     Provider Disclosure:  I agree with above History, Review of Systems, Physical exam and Plan.  I have reviewed the content of the documentation and have edited it as needed. I have personally performed the services documented here and the documentation accurately represents those services and the decisions I have made.      Electronically signed by:  Nikos Armstrong MD

## 2024-02-13 DIAGNOSIS — E11.42 TYPE 2 DIABETES MELLITUS WITH PERIPHERAL NEUROPATHY (H): ICD-10-CM

## 2024-02-13 NOTE — PROGRESS NOTES
[facility] Progress Note    Subject: Patient was seen at wound center for b/l feet. He's known to myself for a long history of foot ulcers. Previously worse on the right foot, now worse on the left foot. He's been extremely active all summer, stating he's been on his feet for 10+ hours a day, knowing the risks of this to his feet.   -He now follows up for his left foot, following second metatarsal resection on 10/19. Had an MRI that confirmed osteomyelitis and was scheduled for surgery quickly afterwards. States no pain since surgery and notices swelling to ankle is much better.  was active on his foot and noticed a lot of drainage, which has now subsided. Has completed ciprofloxcin abx, still take augmentin long term per Id for chronic osteomyelitis.   11/20: Follows up for b/l feet. Has been traveling a lot and walking a lot on his right foot. States had a blister form on his right foot, but that it dried and healed. States no drainage from right foot. States left foot also hasn't been draining other than a small site from the dorsal aspect. Denies pain. Denies N/F/V/C/D. Feels well.      12/6: Follows up for b/l feet. States feels well, but now has more drainage from left foot. Presents in custom molded orthotics/shoes. States that left foot had drainage over the past couple of days, but then none today. States no pain to site. States appears adjacent to previous problem. No problems to right foot. Has some concern because has a lot of travel plans coming up, including going to [place] over [place] for approx 2 weeks.      1/31: Follows up for b/l feet. Had been out of town, in [place] and then [place]. Has been quite active on his feet, hiking and walking. States has been managing his left foot ulcer and it doesn't seem to be worsening. Thinks the drainage is well controlled. Denies pain. Is hoping to get back to golfing and being more active by April. Denies N/F/V/C/D.       2/5: Follows up for left foot. States feels well. Had cast applied last week. No known issues. Did have labs drawn at recent physical and requested sed rate and CRP to be done, these were noted to be slightly elevated. Questions whether there's infection brewing. Feels well. Denies N/F/V/C/D     2/13: Follows up for left foot. Has had cast on for one week. No issues from cast, tolerating well. Is still on antibiotics. Denies pain to left foot. Has been active, exercising and walking a lot. Denies N/F/V/C/D     PMH:   Past Medical History:   Diagnosis Date    Antiplatelet or antithrombotic long-term use     Ascending aorta dilatation (H24)     Cerebral artery occlusion with cerebral infarction (H)     Cerebral infarction (H)     2010    Gastroesophageal reflux disease with esophagitis     H/O blood clots 2022    Hyperlipidemia LDL goal <70     Hypertension     Nonalcoholic steatohepatitis 11/30/2022    Numbness and tingling     Obesity     GILA (obstructive sleep apnea) 10/22/2018    CPAP intolerant    PVD (peripheral vascular disease) (H24)     s/p left partial toe amputation    Renal stone     Tremor     Type 2 diabetes mellitus with diabetic polyneuropathy, with long-term current use of insulin (H)        Social Hx:   Social History     Socioeconomic History    Marital status:      Spouse name: Sydney    Number of children: 2    Years of education: Not on file    Highest education level: Master's degree (e.g., MA, MS, [place], MEd, MSW, ELIANA)   Occupational History    Occupation: marketing     Employer: [employer]     Comment: [employer]   Tobacco Use    Smoking status: Never    Smokeless tobacco: Never   Vaping Use    Vaping Use: Never used   Substance and Sexual Activity    Alcohol use: Yes     Comment: rare---red wine 3x per month    Drug use: Never    Sexual activity: Not Currently     Partners: Female   Other Topics Concern    Parent/sibling w/ CABG, MI or angioplasty before 65F 55M? No   Social History  Narrative    Not on file     Social Determinants of Health     Financial Resource Strain: Low Risk  (1/31/2024)    Financial Resource Strain     Within the past 12 months, have you or your family members you live with been unable to get utilities (heat, electricity) when it was really needed?: No   Food Insecurity: Low Risk  (1/31/2024)    Food Insecurity     Within the past 12 months, did you worry that your food would run out before you got money to buy more?: No     Within the past 12 months, did the food you bought just not last and you didn t have money to get more?: No   Transportation Needs: Low Risk  (1/31/2024)    Transportation Needs     Within the past 12 months, has lack of transportation kept you from medical appointments, getting your medicines, non-medical meetings or appointments, work, or from getting things that you need?: No   Physical Activity: Unknown (1/31/2024)    Exercise Vital Sign     Days of Exercise per Week: 4 days     Minutes of Exercise per Session: Not on file   Stress: No Stress Concern Present (1/31/2024)    [employer] [place] [employer] - Occupational Stress Questionnaire     Feeling of Stress : Not at all   Social Connections: Moderately Integrated (1/31/2024)    Social Connection and Isolation Panel [NHANES]     Frequency of Communication with Friends and Family: Twice a week     Frequency of Social Gatherings with Friends and Family: Never     Attends Hinduism Services: More than 4 times per year     Active Member of Clubs or Organizations: Yes     Attends Club or Organization Meetings: More than 4 times per year     Marital Status:    Interpersonal Safety: Low Risk  (1/31/2024)    Interpersonal Safety     Do you feel physically and emotionally safe where you currently live?: Yes     Within the past 12 months, have you been hit, slapped, kicked or otherwise physically hurt by someone?: No     Within the past 12 months, have you been humiliated or emotionally  abused in other ways by your partner or ex-partner?: No   Housing Stability: Low Risk  (1/31/2024)    Housing Stability     Do you have housing? : Yes     Are you worried about losing your housing?: No       Surgical Hx:   Past Surgical History:   Procedure Laterality Date    AMPUTATE FOOT Left 8/18/2016    Procedure: AMPUTATE FOOT;  Surgeon: Jack Younger DPM;  Location: [facility]    AMPUTATE TOE(S) Right 2/1/2016    Procedure: AMPUTATE TOE(S);  Surgeon: Rachelle Manriquez DPM, Pod;  Location: [facility]    AMPUTATE TOE(S) Right 8/2/2019    Procedure: Right fourth toe partial amputation for treatment of osteomyelitis.;  Surgeon: Jack Younger DPM;  Location: [facility]    AMPUTATE TOE(S) Left 11/8/2019    Procedure: Left second toe amputation at the metatarsophalangeal joint.;  Surgeon: Rachelle Manriquez DPM, Podiatry/Foot and Ankle Surgery;  Location: [facility]    AMPUTATE TOE(S) Right 6/5/2022    Procedure: AMPUTATION OF RIGHT GREAT TOE;  Surgeon: Wili Quiles DPM;  Location: [facility]    AMPUTATE TOE(S) Right 7/28/2022    Procedure: Right foot partial first metatarsal resection;  Surgeon: Tiny Soto DPM;  Location: [facility] OR    ANGIOGRAM      BIOPSY BONE FOOT Right 7/24/2022    Procedure: Bone biopsy, right first metatarsal.;  Surgeon: Valentino Molina DPM;  Location: [facility]    COLONOSCOPY      COMBINED CYSTOSCOPY, RETROGRADES, URETEROSCOPY, INSERT STENT Left 8/21/2016    Procedure: COMBINED CYSTOSCOPY, RETROGRADES, URETEROSCOPY, INSERT STENT;  Surgeon: Artemio Valenzuela MD;  Location: [facility]    COMBINED CYSTOSCOPY, RETROGRADES, URETEROSCOPY, LASER HOLMIUM LITHOTRIPSY URETER(S), INSERT STENT Left 10/3/2016    Procedure: COMBINED CYSTOSCOPY, RETROGRADES, URETEROSCOPY, LASER HOLMIUM LITHOTRIPSY URETER(S), INSERT STENT;  Surgeon: Artemio Valenzuela MD;  Location: [facility]    EXTRACORPOREAL SHOCK WAVE LITHOTRIPSY (ESWL) Left 9/8/2016    Procedure: EXTRACORPOREAL SHOCK WAVE LITHOTRIPSY (ESWL);  Surgeon:  Artemio Valenzuela MD;  Location: [facility]    INCISION AND DRAINAGE FOOT, COMBINED Right 7/24/2022    Procedure: Incision and drainage, right foot ;  Surgeon: Valentino Molina DPM;  Location: [facility]    IRRIGATION AND DEBRIDEMENT FOOT, COMBINED Left 10/19/2023    Procedure: Left foot second metatarsal resection , . Left plantar ulcer excisional debridement, down to and including tendon, with closure, site measuring 4 cm x 2 cm x 1 cm;  Surgeon: Tiny Soto DPM;  Location: [facility]    OSTEOTOMY FOOT Right 11/30/2022    Procedure: 1. Right second metatarsal floating osteotomy  2. Right second digit proximal phalanx arthroplasty 3. Right percutaneous flexor tenotomy;  Surgeon: Tiny Soto DPM;  Location: [facility]    OSTEOTOMY FOOT Right 1/19/2023    Procedure: Right foot third metatarsal head excision, ulcer debridement, matatarsal osteotomies;  Surgeon: Tiny Soto DPM;  Location: [facility]    RECESSION GASTROCNEMIUS Right 2/1/2016    Procedure: RECESSION GASTROCNEMIUS;  Surgeon: Rachelle Manriquez DPM, Pod;  Location: [facility]       Allergies:    Allergies   Allergen Reactions    Statins Swelling     Other reaction(s): Other (see comments)  SIMCOR caused edema in extremities      Bactrim [Sulfamethoxazole-Trimethoprim] Nausea and Vomiting    Sulfa Antibiotics Nausea    Niacin Swelling     SIMCOR caused edema in extremities    Simvastatin Swelling     SIMCOR caused edema in extremities       Medications:   Current Outpatient Medications   Medication    amLODIPine (NORVASC) 5 MG tablet    amoxicillin-clavulanate (AUGMENTIN) 875-125 MG tablet    atorvastatin (LIPITOR) 40 MG tablet    blood glucose (NO BRAND SPECIFIED) lancets standard    calcium carbonate (OS-NARA) 500 MG tablet    Cholecalciferol (VITAMIN D3) 25 MCG (1000 UT) CAPS    Co-Enzyme Q10 100 MG CAPS    Continuous Blood Gluc Sensor (FREESTYLE LUCERO 14 DAY SENSOR) MISC    ELIQUIS ANTICOAGULANT 5 MG tablet    gabapentin (NEURONTIN) 100 MG capsule     hydrochlorothiazide (MICROZIDE) 12.5 MG capsule    Insulin Aspart FlexPen 100 UNIT/ML SOPN    insulin degludec (TRESIBA FLEXTOUCH) 100 UNIT/ML pen    insulin pen needle (B-D U/F) 31G X 5 MM miscellaneous    ketoconazole (NIZORAL) 2 % external shampoo    lisinopril (ZESTRIL) 40 MG tablet    MAGNESIUM GLYCINATE PLUS PO    metFORMIN (GLUCOPHAGE XR) 500 MG 24 hr tablet    Multiple Vitamins-Minerals (MULTIVITAMIN PO)    mupirocin (BACTROBAN) 2 % external ointment    Omega-3 Fatty Acids (OMEGA-3 FISH OIL PO)    omeprazole (PRILOSEC) 40 MG DR capsule    propranolol (INDERAL) 20 MG tablet    Semaglutide, 1 MG/DOSE, (OZEMPIC, 1 MG/DOSE,) 4 MG/3ML pen    tadalafil (CIALIS) 5 MG tablet     No current facility-administered medications for this encounter.         Objective:  Vitals:  BP (!) 146/75 (BP Location: Left arm, Patient Position: Sitting, Cuff Size: Adult Regular)   Pulse 59   Temp 98.4  F (36.9  C) (Temporal)       .   Wound (used by OP [facility] only) 09/25/23 1505 Left plantar foot surgical (Active)   Thickness/Stage full thickness 02/12/24 1343   Base pink;red 02/12/24 1343   Periwound intact;macerated 02/12/24 1343   Periwound Temperature warm 02/12/24 1343   Periwound Skin Turgor soft 02/12/24 1343   Edges callused 02/12/24 1343   Length (cm) 1.7 02/12/24 1343   Width (cm) 1.6 02/12/24 1343   Depth (cm) 0.3 02/12/24 1343   Wound (cm^2) 2.72 cm^2 02/12/24 1343   Wound Volume (cm^3) 0.82 cm^3 02/12/24 1343   Wound healing % -377.19 02/12/24 1343   Drainage Characteristics/Odor serosanguineous 02/12/24 1343   Drainage Amount moderate 02/12/24 1343   Care, Wound debrided 02/12/24 1343          A1C: 7.9 up from previously 6.0     General:  Patient is alert and orientated.  NAD      Dermatologic: Right foot plantar ulcer: remains healed   Left foot submet 3: granular. Depth to subcutaneous tissue. No probe to bone. Minimal periwound callus. No cellulitis. Nontender.   No noted ankle edema. Slightly smaller compared to last  week.      Vascular: DP & PT pulses are intact & regular bilaterally.  No significant edema or varicosities noted.  CFT and skin temperature is normal to both lower extremities.     Neurologic: Lower extremity sensation is absent      Musculoskeletal: Patient is ambulatory without assistive device. S/p several digital amputations b/l.     Imaging:    Left foot XR 9/13      IMPRESSION: Partial amputation of the first and second toes at the MTP  joints. No fracture or osteomyelitis. No degenerative or erosive  arthritic change. Hammer toe deformities.    Cultures:       Foot, Left; Tissue   0 Result Notes  Culture Isolated in broth only Finegoldia magna Abnormal    On day 5 of incubation  Susceptibilities not routinely done, refer to antibiogram to view typical susceptibility profiles   Isolated in broth only Pseudomonas aeruginosa Abnormal    On day 5 of incubation         Foot, Left; Tissue   0 Result Notes  Culture No anaerobic organisms isolated       Isolated in broth only Staphylococcus epidermidis Abnormal    On day 2 of incubation  Not isolated or reported on routine aerobic culture  Susceptibilities not routinely done, refer to antibiogram to view typical susceptibility profiles         Specimens:   A) - Foot, Left, 2nd left proximal metatarsal                                                        B) - Foot, Left, 2nd left metatarsal head          Labs reviewed:   Sed   41 from 21 (7 months ago)  CRP 9.6 from 3.87  Hba1c: 7.9 from 6.0  CBC: wbc 6.7    Assessment:   S/p right foot second ray resection, plantar foot ulcer debridement on 10/19 --> healed   Left foot submet 3 ulcer   Diabetes Mellitus --> hba1c: 7.9     Plan:    -Discussed all findings with patient. Chart and imaging reviewed.   -Left foot without obvious signs of infection. Slightly smaller since starting the cast.   -Debrided site. New TCC applied. Measured patient today and 3 in TCC applied.   -Discussed ongoing close follow up. No further  antibiotics prescribed.   -Follow up in 1 week.         Procedure:  After verbal consent, per protocol lidocaine was applied to the wound. Excisional debridement was performed on ulcer.   #15 blade was used to debride ulcer down to and including subcutaneous tissue. Bleeding controlled with light pressure.  No drainage noted.   < 20sq cm debrided.  Dry dressing applied to foot.  Patient tolerated procedure well.       Tiny Soto, PRAFULM            Further instructions from your care team         Crispin Rojas      1960    A DME order was not completed because the patient is having dressing changes done at [facility]. PLEASE BRING ANY  SUPPLIES THAT WERE GIVEN TO YOU WITH YOU TO YOUR NEXT VISIT.    PLAN:  Continue to wear cast protector in shower  Continue to minimize walking/weight bearing  Keep blood sugars below 150 and A1C below 7  Whenever you sit, raise your ankle above your hips to promote wound healing      Wound Dressing Change: left plantar foot  - Prophase protocol  - Cleanse with mild unscented soap and water (such as Cetaphil, Cerave or Dove)  - Apply CriticAid paste to periwound skin  - Apply Atractain to skin otherwise  - Apply Fibracol, then Hydrofera Blue Ready-Transfer  - Apply Spandage size 7/8  - Apply 1/4 or 1/2 Drawtex pad  - Secure with roll gauze & tape  - Apply TCC-EZ  Change weekly at [facility]      TCC-EZ Cast:  The TCC-EZ Total Contact Cast that has been placed on your foot and leg by your  doctor has been proven to be an effective method of off-loading your foot. It is  important that you understand potential problems with the cast so they can be  avoided. Please follow the instructions below during your care.  1. Limit activity for first 24 hours after cast is applied  2. The outer boot MUST always be worn when walking.  3. Do not insert anything under the cast. Injury to your skin can be very dangerous.  4. Subsequent cast changes will take place from 1-2 times per week as  "instructed.  5. If the cast is \"loose\" or \"rubbing\" or causing pain, please call us- it may need to be  changed. Or if you develop a fever, chills, nausea, or vomiting the cast must be  removed to check the wound  6. Keep your cast dry. If the cast gets wet, it must be changed immediately. (Use a  protective cast bag when you are bathing.)  7. If the cast is removed in the hospital or ER the cast technician must be notified  there is only padding on the front over the shin, on the ankles and over the foot and  toes.  8. You will need to wear a cast for 1-2 additional weeks after your wound has healed  or until your skin has returned to normal thickness.  9. If the Clinic is closed proceed to the nearest emergency room or urgent care  This is a non-traditional cast and must be removed in a different manner.  10. Call clinic with any problems with your cast. Our phone number is [phone].  After hours please phone your PCP or go to the nearest ER.      Protein:  A diet high in protein is important for wound healing, we recommend getting up to 90 grams of protein per day.  Taking protein shakes or bars are a good way to get extra protein in your diet.  Good sources of protein:  Pork 26g per 3 oz  Whey protein powder - 24g per scoop (on average)  Greek yogurt - 23g per 8oz  Chicken or Turkey - 23g per 3oz  Fish - 20-25g per 3oz  Beef - 18-23g per 3oz  Navy beans - 20g per cup  Cottage cheese - 14g per 1/2 cup  Lentils - 13g per 1/4 cup  Beef jerky 13g per 1oz  2% milk - 8g per cup  Peanut butter - 8g per 2 tablespoons  Eggs - 6g per egg  Mixed nuts - 6g per 2oz      Main Provider: GONZALO Rios.P.M. February 12, 2024    Call us at [phone] if you have any questions about your wounds, have redness or swelling around your wound, have a fever of 101 degrees Fahrenheit or greater or if you have any other problems or concerns. We answer the phone Monday through Friday 8 am to 4 pm, please leave a message as we " check the voicemail frequently throughout the day.     If you had a positive experience please indicate that on your patient satisfaction survey form that [facility] will be sending you.    It was a pleasure meeting with you today.  Thank you for allowing me and my team the privilege of caring for you today.  YOU are the reason we are here, and I truly hope we provided you with the excellent service you deserve.  Please let us know if there is anything else we can do for you so that we can be sure you are leaving completely satisfied with your care experience.      If you have any billing related questions please call the [facility] Business office at [phone]. The clinic staff does not handle billing related matters.    If you are scheduled to have a follow up appointment, you will receive a reminder call the day before your visit. On the appointment day please arrive 15 minutes prior to your appointment time. If you are unable to keep that appointment, please call the clinic to cancel or reschedule. If you are more than 10 minutes late or greater for your scheduled appointment time, the clinic policy is that you may be asked to reschedule.

## 2024-02-15 NOTE — TELEPHONE ENCOUNTER
Retail Pharmacy Prior Authorization Team   Phone: [phone]    PA Initiation    Medication: TADALAFIL 5 MG PO TABS  Insurance Company: [email or web address] Clinical Review - Phone [phone] Fax [phone]  Pharmacy Filling the Rx: [id number] [place], [place] - [address] AT [place] [address] & [facility]  Filling Pharmacy Phone: [phone]  Filling Pharmacy Fax:    Start Date: 2/15/2024

## 2024-02-16 ENCOUNTER — OFFICE VISIT (OUTPATIENT)
Dept: ENDOCRINOLOGY | Facility: CLINIC | Age: 64
End: 2024-02-16
Payer: COMMERCIAL

## 2024-02-16 VITALS
WEIGHT: 260 LBS | SYSTOLIC BLOOD PRESSURE: 151 MMHG | DIASTOLIC BLOOD PRESSURE: 75 MMHG | HEART RATE: 56 BPM | OXYGEN SATURATION: 96 % | BODY MASS INDEX: 35.26 KG/M2

## 2024-02-16 DIAGNOSIS — E11.42 TYPE 2 DIABETES MELLITUS WITH PERIPHERAL NEUROPATHY (H): Primary | ICD-10-CM

## 2024-02-16 DIAGNOSIS — E66.01 CLASS 2 SEVERE OBESITY DUE TO EXCESS CALORIES WITH SERIOUS COMORBIDITY AND BODY MASS INDEX (BMI) OF 35.0 TO 35.9 IN ADULT (H): ICD-10-CM

## 2024-02-16 DIAGNOSIS — E66.812 CLASS 2 SEVERE OBESITY DUE TO EXCESS CALORIES WITH SERIOUS COMORBIDITY AND BODY MASS INDEX (BMI) OF 35.0 TO 35.9 IN ADULT (H): ICD-10-CM

## 2024-02-16 PROCEDURE — 99214 OFFICE O/P EST MOD 30 MIN: CPT | Performed by: NURSE PRACTITIONER

## 2024-02-16 RX ORDER — FLURBIPROFEN SODIUM 0.3 MG/ML
SOLUTION/ DROPS OPHTHALMIC
Qty: 500 EACH | Refills: 3 | Status: SHIPPED | OUTPATIENT
Start: 2024-02-16 | End: 2024-07-28

## 2024-02-16 RX ORDER — FERROUS SULFATE 325(65) MG
325 TABLET ORAL
Status: ON HOLD | COMMUNITY
End: 2024-06-17

## 2024-02-16 RX ORDER — INSULIN ASPART 100 [IU]/ML
70 INJECTION, SOLUTION INTRAVENOUS; SUBCUTANEOUS DAILY
Qty: 30 ML | Refills: 0 | Status: SHIPPED | OUTPATIENT
Start: 2024-02-16 | End: 2024-03-11

## 2024-02-16 RX ORDER — FLASH GLUCOSE SENSOR
KIT MISCELLANEOUS
Qty: 6 EACH | Refills: 3 | Status: SHIPPED | OUTPATIENT
Start: 2024-02-16 | End: 2024-07-28

## 2024-02-16 NOTE — PROGRESS NOTES
[facility] ENDOCRINOLOGY    Diabetes Note 2/16/2024    Crispin Rojas, 1960, [id number]          Reason for visit      1. Type 2 diabetes mellitus with peripheral neuropathy (H)    2. Class 2 severe obesity due to excess calories with serious comorbidity and body mass index (BMI) of 35.0 to 35.9 in adult (H)        HPI     Crispin Rojas is a very pleasant 63 year old old male who presents for follow up.  SUMMARY:    Pat returns today in follow-up for DM 2. His recent A1c was 7.9, which was up significantly from his previous of 6.0.  He has been struggling with an infection in his L foot and has been under treatment with antibiotics. He is currently in a cast.     He has been without his Ozempic for 3 doses. He has been unable to find it.  He was in [place] recently, and did not have it while there. He has been out of Novolog for 3 days.     He is using the [employer] 14 day CGM, which was downloaded and data was reviewed today. TIR was 30% for the last two weeks. He was high or very high, 66%, and low, 4%. His lowest lows occurred when he was awake, and he reports that they were not troublesome. GMI was 8.9.      Lack of good control can be tied to the missing medication and the infection.     In addition to the Ozempic and Novolog, of which he takes per what he is eating, he takes Tresiba, 50-55 units daily. He is also taking Metformin XR, 1000 mg BID.     The missing Ozempic has not been helpful for his waistline.       Blood glucose data:      Past Medical History     Patient Active Problem List   Diagnosis    Calculus of kidney    Chronic left shoulder pain    Type 2 diabetes mellitus with peripheral neuropathy (H)    Hyperlipidemia LDL goal <70    Essential hypertension    Right bundle branch block    GILA (obstructive sleep apnea)    Diabetic ulcer of lower extremity (H)    PVD (peripheral vascular disease) (H24)    Scoliosis of thoracic spine, unspecified scoliosis type    Other sequelae  following unspecified cerebrovascular disease    Cervical pain    Bilateral thoracic back pain    Osteomyelitis of great toe of right foot (H)    Acute deep vein thrombosis (DVT) of tibial vein of right lower extremity (H)    Abnormal CT of liver    Benign neoplasm of transverse colon    Duodenitis    Hemorrhoids    Polyp of colon    Acute osteomyelitis of right foot (H)    Essential tremor    Nonalcoholic steatohepatitis    Otalgia of left ear    Shoulder pain    Liver cirrhosis secondary to SNYDER (H)    Gastro-esophageal reflux disease with esophagitis    Chronic osteomyelitis of right foot (H)    Chronic foot ulcer with fat layer exposed, left (H)    Diabetic ulcer of left foot with fat layer exposed (H)    Class 2 severe obesity due to excess calories with serious comorbidity in adult (H)        Family History       family history includes Arthritis in his mother; Cancer in his mother; Cerebrovascular Disease in his mother; Chronic Obstructive Pulmonary Disease in his father; Connective Tissue Disorder in his mother; Coronary Artery Disease in his father; Diabetes in his father, maternal grandfather, mother, and sister.    Social History      reports that he has never smoked. He has never used smokeless tobacco. He reports current alcohol use. He reports that he does not use drugs.      Review of Systems     Patient has no polyuria or polydipsia, no chest pain, dyspnea or TIA's, no numbness, tingling or pain in extremities  Remainder negative except as noted in HPI.    Vital Signs     BP (!) 151/75 (BP Location: Left arm, Patient Position: Sitting, Cuff Size: Adult Large)   Pulse 56   Wt 117.9 kg (260 lb)   SpO2 96%   BMI 35.26 kg/m    Wt Readings from Last 3 Encounters:   02/16/24 117.9 kg (260 lb)   02/12/24 118.3 kg (260 lb 14.4 oz)   01/31/24 109.8 kg (242 lb)       Physical Exam     Constitutional:  Well developed, Well nourished  HENT:  Normocephalic,   Neck: Thyroid normal, No lymph nodes,  "Supple  Eyes:  PERRL, Conjunctiva pink  Respiratory:  Normal breath sounds, No respiratory distress  Cardiovascular:  Normal heart rate, Normal rhythm, No murmurs  GI:  Bowel sounds normal, Soft, No tenderness  Musculoskeletal:  No gross deformity or lesions, normal dorsalis pedis pulses  Skin: No acanthosis nigricans, lipoatrophy or lipodystrophy  Neurologic:  Alert & oriented x 3, nonfocal  Psychiatric:  Affect, Mood, Insight appropriate  Diabetic foot exam: being followed by Podiatry. L foot in cast.       Assessment     1. Type 2 diabetes mellitus with peripheral neuropathy (H)    2. Class 2 severe obesity due to excess calories with serious comorbidity and body mass index (BMI) of 35.0 to 35.9 in adult (H)        Plan     Will get his Novolog refilled. I am also increasing his Ozempic, which actually may enable him to actually get it. No other changes today. He is hopeful that when it starts to get warmer that he will be able to be out and about more.         Shira Montanez NP  [facility]  2/16/2024  8:20 AM    Lab Results     No results found for: \"HGBA1C\", \"CREATININE\", \"MICROALBUR\"    Cholesterol   Date Value Ref Range Status   01/31/2024 191 <200 mg/dL Final   06/22/2021 95 <200 mg/dL Final     HDL Cholesterol   Date Value Ref Range Status   06/22/2021 42 >39 mg/dL Final     Direct Measure HDL   Date Value Ref Range Status   01/31/2024 41 >=40 mg/dL Final     Triglycerides   Date Value Ref Range Status   01/31/2024 96 <150 mg/dL Final   06/22/2021 42 <150 mg/dL Final     Comment:     Fasting specimen       [unfilled]      Current Medications     Outpatient Medications Prior to Visit   Medication Sig Dispense Refill    amLODIPine (NORVASC) 5 MG tablet Take 1 tablet (5 mg) by mouth 2 times daily Take one tablet twice daily  SEE MD THIS QUARTER 200 tablet 5    amoxicillin-clavulanate (AUGMENTIN) 875-125 MG tablet Take 1 tablet by mouth daily at 2 pm      atorvastatin (LIPITOR) 40 MG tablet Take 1 tablet " (40 mg) by mouth daily Take one tablet daily SEE PROVIDER FOR NEXT REFILL 90 tablet 4    blood glucose (NO BRAND SPECIFIED) lancets standard Use to test blood sugar 3 times daily or as directed. 300 each 3    calcium carbonate (OS-NARA) 500 MG tablet Take 1 tablet by mouth 2 times daily      Cholecalciferol (VITAMIN D3) 25 MCG (1000 UT) CAPS Take 1,000 Units by mouth daily       Co-Enzyme Q10 100 MG CAPS Take 100 mg by mouth daily       Continuous Blood Gluc Sensor (FREESTYLE LUCERO 14 DAY SENSOR) [facility] USE ONE EVERY 14 DAYS 6 each 3    ELIQUIS ANTICOAGULANT 5 MG tablet Take 1 tablet (5 mg) by mouth 2 times daily 180 tablet 4    ferrous sulfate (FEROSUL) 325 (65 Fe) MG tablet Take 325 mg by mouth daily (with breakfast)      gabapentin (NEURONTIN) 100 MG capsule Take 400 mg by mouth 3 times daily      hydrochlorothiazide (MICROZIDE) 12.5 MG capsule Take 1 capsule (12.5 mg) by mouth every morning 90 capsule 4    Insulin Aspart FlexPen 100 UNIT/ML SOPN Inject 70 Units Subcutaneous daily 30 mL 0    insulin degludec (TRESIBA FLEXTOUCH) 100 UNIT/ML pen Administer 50 - 55 units under skin daily 45 mL 3    insulin pen needle (B-D U/F) 31G X 5 MM miscellaneous USE TO INJECT LANTUS TWICE DAILY AND NOVOLOG THREE TIMES DAILY AS DIRECTED 500 each 0    ketoconazole (NIZORAL) 2 % external shampoo Apply topically daily as needed for itching or irritation See MD after 6 weeks 120 mL 1    lisinopril (ZESTRIL) 40 MG tablet Take 1 tablet (40 mg) by mouth daily 90 tablet 4    MAGNESIUM GLYCINATE PLUS PO Take 400 mg by mouth 2 times daily      metFORMIN (GLUCOPHAGE XR) 500 MG 24 hr tablet Take 2 tablets (1,000 mg) by mouth 2 times daily 360 tablet 0    Multiple Vitamins-Minerals (MULTIVITAMIN PO) Take 1 tablet by mouth daily       mupirocin (BACTROBAN) 2 % external ointment Apply a small amount to affected nares 2 times a day for one month. 30 g 1    Omega-3 Fatty Acids (OMEGA-3 FISH OIL PO) Take 1 g by mouth 2 times daily (with meals)        omeprazole (PRILOSEC) 40 MG DR capsule Take 40 mg by mouth daily      propranolol (INDERAL) 20 MG tablet Take 20 mg by mouth      Semaglutide, 1 MG/DOSE, (OZEMPIC, 1 MG/DOSE,) 4 MG/3ML pen INJECT 1MG SUBCITANEOUSLY AS DIRECTED EVERY 7 DAYS ON WEDNESDAYS 9 mL 3    tadalafil (CIALIS) 5 MG tablet TAKE 1 TABLET BY MOUTH EVERY DAY AS NEEDED one hour before intercourse 30 tablet 1     No facility-administered medications prior to visit.

## 2024-02-16 NOTE — LETTER
2/16/2024         RE: Crispin Rojas  [address] [facility] [address]        Dear Colleague,    Thank you for referring your patient, Crispin Rojas, to the [facility] [place]. Please see a copy of my visit note below.    [facility] ENDOCRINOLOGY    Diabetes Note 2/16/2024    Crispin Rojas, 1960, [id number]          Reason for visit      1. Type 2 diabetes mellitus with peripheral neuropathy (H)    2. Class 2 severe obesity due to excess calories with serious comorbidity and body mass index (BMI) of 35.0 to 35.9 in adult (H)        HPI     Crispin Rojas is a very pleasant 63 year old old male who presents for follow up.  SUMMARY:    Judie returns today in follow-up for DM 2. His recent A1c was 7.9, which was up significantly from his previous of 6.0.  He has been struggling with an infection in his L foot and has been under treatment with antibiotics. He is currently in a cast.     He has been without his Ozempic for 3 doses. He has been unable to find it.  He was in [place] recently, and did not have it while there. He has been out of Novolog for 3 days.     He is using the [employer] 14 day CGM, which was downloaded and data was reviewed today. TIR was 30% for the last two weeks. He was high or very high, 66%, and low, 4%. His lowest lows occurred when he was awake, and he reports that they were not troublesome. GMI was 8.9.      Lack of good control can be tied to the missing medication and the infection.     In addition to the Ozempic and Novolog, of which he takes per what he is eating, he takes Tresiba, 50-55 units daily. He is also taking Metformin XR, 1000 mg BID.     The missing Ozempic has not been helpful for his waistline.       Blood glucose data:      Past Medical History     Patient Active Problem List   Diagnosis     Calculus of kidney     Chronic left shoulder pain     Type 2 diabetes mellitus with peripheral neuropathy (H)     Hyperlipidemia LDL  goal <70     Essential hypertension     Right bundle branch block     GILA (obstructive sleep apnea)     Diabetic ulcer of lower extremity (H)     PVD (peripheral vascular disease) (H24)     Scoliosis of thoracic spine, unspecified scoliosis type     Other sequelae following unspecified cerebrovascular disease     Cervical pain     Bilateral thoracic back pain     Osteomyelitis of great toe of right foot (H)     Acute deep vein thrombosis (DVT) of tibial vein of right lower extremity (H)     Abnormal CT of liver     Benign neoplasm of transverse colon     Duodenitis     Hemorrhoids     Polyp of colon     Acute osteomyelitis of right foot (H)     Essential tremor     Nonalcoholic steatohepatitis     Otalgia of left ear     Shoulder pain     Liver cirrhosis secondary to SNYDER (H)     Gastro-esophageal reflux disease with esophagitis     Chronic osteomyelitis of right foot (H)     Chronic foot ulcer with fat layer exposed, left (H)     Diabetic ulcer of left foot with fat layer exposed (H)     Class 2 severe obesity due to excess calories with serious comorbidity in adult (H)        Family History       family history includes Arthritis in his mother; Cancer in his mother; Cerebrovascular Disease in his mother; Chronic Obstructive Pulmonary Disease in his father; Connective Tissue Disorder in his mother; Coronary Artery Disease in his father; Diabetes in his father, maternal grandfather, mother, and sister.    Social History      reports that he has never smoked. He has never used smokeless tobacco. He reports current alcohol use. He reports that he does not use drugs.      Review of Systems     Patient has no polyuria or polydipsia, no chest pain, dyspnea or TIA's, no numbness, tingling or pain in extremities  Remainder negative except as noted in HPI.    Vital Signs     BP (!) 151/75 (BP Location: Left arm, Patient Position: Sitting, Cuff Size: Adult Large)   Pulse 56   Wt 117.9 kg (260 lb)   SpO2 96%   BMI 35.26  "kg/m    Wt Readings from Last 3 Encounters:   02/16/24 117.9 kg (260 lb)   02/12/24 118.3 kg (260 lb 14.4 oz)   01/31/24 109.8 kg (242 lb)       Physical Exam     Constitutional:  Well developed, Well nourished  HENT:  Normocephalic,   Neck: Thyroid normal, No lymph nodes, Supple  Eyes:  PERRL, Conjunctiva pink  Respiratory:  Normal breath sounds, No respiratory distress  Cardiovascular:  Normal heart rate, Normal rhythm, No murmurs  GI:  Bowel sounds normal, Soft, No tenderness  Musculoskeletal:  No gross deformity or lesions, normal dorsalis pedis pulses  Skin: No acanthosis nigricans, lipoatrophy or lipodystrophy  Neurologic:  Alert & oriented x 3, nonfocal  Psychiatric:  Affect, Mood, Insight appropriate  Diabetic foot exam: being followed by Podiatry. L foot in cast.       Assessment     1. Type 2 diabetes mellitus with peripheral neuropathy (H)    2. Class 2 severe obesity due to excess calories with serious comorbidity and body mass index (BMI) of 35.0 to 35.9 in adult (H)        Plan     Will get his Novolog refilled. I am also increasing his Ozempic, which actually may enable him to actually get it. No other changes today. He is hopeful that when it starts to get warmer that he will be able to be out and about more.         Shira Montanez NP  [facility]  2/16/2024  8:20 AM    Lab Results     No results found for: \"HGBA1C\", \"CREATININE\", \"MICROALBUR\"    Cholesterol   Date Value Ref Range Status   01/31/2024 191 <200 mg/dL Final   06/22/2021 95 <200 mg/dL Final     HDL Cholesterol   Date Value Ref Range Status   06/22/2021 42 >39 mg/dL Final     Direct Measure HDL   Date Value Ref Range Status   01/31/2024 41 >=40 mg/dL Final     Triglycerides   Date Value Ref Range Status   01/31/2024 96 <150 mg/dL Final   06/22/2021 42 <150 mg/dL Final     Comment:     Fasting specimen       [unfilled]      Current Medications     Outpatient Medications Prior to Visit   Medication Sig Dispense Refill     amLODIPine " (NORVASC) 5 MG tablet Take 1 tablet (5 mg) by mouth 2 times daily Take one tablet twice daily  SEE MD THIS QUARTER 200 tablet 5     amoxicillin-clavulanate (AUGMENTIN) 875-125 MG tablet Take 1 tablet by mouth daily at 2 pm       atorvastatin (LIPITOR) 40 MG tablet Take 1 tablet (40 mg) by mouth daily Take one tablet daily SEE PROVIDER FOR NEXT REFILL 90 tablet 4     blood glucose (NO BRAND SPECIFIED) lancets standard Use to test blood sugar 3 times daily or as directed. 300 each 3     calcium carbonate (OS-NARA) 500 MG tablet Take 1 tablet by mouth 2 times daily       Cholecalciferol (VITAMIN D3) 25 MCG (1000 UT) CAPS Take 1,000 Units by mouth daily        Co-Enzyme Q10 100 MG CAPS Take 100 mg by mouth daily        Continuous Blood Gluc Sensor (FREESTYLE LUCERO 14 DAY SENSOR) [employer] USE ONE EVERY 14 DAYS 6 each 3     ELIQUIS ANTICOAGULANT 5 MG tablet Take 1 tablet (5 mg) by mouth 2 times daily 180 tablet 4     ferrous sulfate (FEROSUL) 325 (65 Fe) MG tablet Take 325 mg by mouth daily (with breakfast)       gabapentin (NEURONTIN) 100 MG capsule Take 400 mg by mouth 3 times daily       hydrochlorothiazide (MICROZIDE) 12.5 MG capsule Take 1 capsule (12.5 mg) by mouth every morning 90 capsule 4     Insulin Aspart FlexPen 100 UNIT/ML SOPN Inject 70 Units Subcutaneous daily 30 mL 0     insulin degludec (TRESIBA FLEXTOUCH) 100 UNIT/ML pen Administer 50 - 55 units under skin daily 45 mL 3     insulin pen needle (B-D U/F) 31G X 5 MM miscellaneous USE TO INJECT LANTUS TWICE DAILY AND NOVOLOG THREE TIMES DAILY AS DIRECTED 500 each 0     ketoconazole (NIZORAL) 2 % external shampoo Apply topically daily as needed for itching or irritation See MD after 6 weeks 120 mL 1     lisinopril (ZESTRIL) 40 MG tablet Take 1 tablet (40 mg) by mouth daily 90 tablet 4     MAGNESIUM GLYCINATE PLUS PO Take 400 mg by mouth 2 times daily       metFORMIN (GLUCOPHAGE XR) 500 MG 24 hr tablet Take 2 tablets (1,000 mg) by mouth 2 times daily 360 tablet 0      Multiple Vitamins-Minerals (MULTIVITAMIN PO) Take 1 tablet by mouth daily        mupirocin (BACTROBAN) 2 % external ointment Apply a small amount to affected nares 2 times a day for one month. 30 g 1     Omega-3 Fatty Acids (OMEGA-3 FISH OIL PO) Take 1 g by mouth 2 times daily (with meals)        omeprazole (PRILOSEC) 40 MG DR capsule Take 40 mg by mouth daily       propranolol (INDERAL) 20 MG tablet Take 20 mg by mouth       Semaglutide, 1 MG/DOSE, (OZEMPIC, 1 MG/DOSE,) 4 MG/3ML pen INJECT 1MG SUBCITANEOUSLY AS DIRECTED EVERY 7 DAYS ON WEDNESDAYS 9 mL 3     tadalafil (CIALIS) 5 MG tablet TAKE 1 TABLET BY MOUTH EVERY DAY AS NEEDED one hour before intercourse 30 tablet 1     No facility-administered medications prior to visit.                     Again, thank you for allowing me to participate in the care of your patient.        Sincerely,        Shira Montanez NP

## 2024-02-16 NOTE — TELEPHONE ENCOUNTER
Note: Due to record-high volumes, our turn-around is time taking longer than normal . We are currently 10 days behind in the pools.   We are working diligently to submit all requests in a timely manner and in the order they are received. Please only flag TRUE URGENT requests as high priority to the pool at this time.   If you have questions - please send a note/message in the active PA encounter and send back to the RPPA (Retail Pharmacy Prior Authorization) team [[id number]].    If you have more specific questions about our process please reach out to our supervisor Marietta Alba.   Thank you!     We are currently submitting requests we received on 02/03/2024    PRIOR AUTHORIZATION DENIED    Medication: TADALAFIL 5 MG PO TABS  Insurance Company: [employer] - Phone [phone] Fax [phone]  Denial Date: 2/16/2024  Denial Rational:         Appeal Information: Drug exclusions can not be appealed. This medication will not be covered by the prescription plan for any reason. The drug is not on formulary and there are no loopholes to gaining approval.           Patient Notified: No

## 2024-02-19 RX ORDER — INSULIN ASPART 100 [IU]/ML
INJECTION, SOLUTION INTRAVENOUS; SUBCUTANEOUS
Qty: 30 ML | Refills: 0 | OUTPATIENT
Start: 2024-02-19

## 2024-02-20 ENCOUNTER — HOSPITAL ENCOUNTER (OUTPATIENT)
Dept: WOUND CARE | Facility: CLINIC | Age: 64
Discharge: HOME | End: 2024-02-20
Payer: COMMERCIAL

## 2024-02-20 VITALS
HEART RATE: 62 BPM | DIASTOLIC BLOOD PRESSURE: 80 MMHG | SYSTOLIC BLOOD PRESSURE: 144 MMHG | RESPIRATION RATE: 20 BRPM | TEMPERATURE: 98.3 F

## 2024-02-20 DIAGNOSIS — L97.522 DIABETIC ULCER OF OTHER PART OF LEFT FOOT ASSOCIATED WITH TYPE 2 DIABETES MELLITUS, WITH FAT LAYER EXPOSED (H): Primary | ICD-10-CM

## 2024-02-20 DIAGNOSIS — L03.119 CELLULITIS OF LOWER EXTREMITY, UNSPECIFIED LATERALITY: ICD-10-CM

## 2024-02-20 DIAGNOSIS — E11.621 DIABETIC ULCER OF OTHER PART OF LEFT FOOT ASSOCIATED WITH TYPE 2 DIABETES MELLITUS, WITH FAT LAYER EXPOSED (H): Primary | ICD-10-CM

## 2024-02-20 PROCEDURE — 87075 CULTR BACTERIA EXCEPT BLOOD: CPT | Performed by: PHYSICIAN ASSISTANT

## 2024-02-20 PROCEDURE — 97597 DBRDMT OPN WND 1ST 20 CM/<: CPT | Performed by: PHYSICIAN ASSISTANT

## 2024-02-20 PROCEDURE — 87070 CULTURE OTHR SPECIMN AEROBIC: CPT | Performed by: PHYSICIAN ASSISTANT

## 2024-02-20 PROCEDURE — 99214 OFFICE O/P EST MOD 30 MIN: CPT | Mod: 25 | Performed by: PHYSICIAN ASSISTANT

## 2024-02-20 PROCEDURE — 97597 DBRDMT OPN WND 1ST 20 CM/<: CPT | Mod: LT | Performed by: PHYSICIAN ASSISTANT

## 2024-02-20 PROCEDURE — 29445 APPL RIGID TOT CNTC LEG CAST: CPT | Performed by: PHYSICIAN ASSISTANT

## 2024-02-20 RX ORDER — CIPROFLOXACIN 500 MG/1
500 TABLET, FILM COATED ORAL 2 TIMES DAILY
Qty: 14 TABLET | Refills: 0 | Status: SHIPPED | OUTPATIENT
Start: 2024-02-20 | End: 2024-02-27

## 2024-02-20 NOTE — DISCHARGE INSTRUCTIONS
"Crispin Rojas      1960    A DME order was not completed because the patient is having dressing changes done at [facility]. PLEASE BRING ANY SUPPLIES THAT WERE GIVEN TO YOU WITH YOU TO YOUR NEXT VISIT.    PLAN:  Continue to wear cast protector in shower  Continue to minimize walking/weight bearing  Keep blood sugars below 150 and A1C below 7  Whenever you sit, raise your ankle above your hips to promote wound healing  Prescription sent for Cipro - hold your Multi-vitamin and Magnesium while   Reach out to [facility] to get new orthotics (order sent 09/2023): # [phone]      Wound Dressing Change: left plantar foot  - Wash your hands with soap and water before you begin your dressing change and prepare a clean surface for dressings.   - Prophase protocol  - Apply small amount of Betadine - allow to dry  - Apply CriticAid paste to periwound skin  - Apply Atractain to skin otherwise  - Apply Acticoat 7  - Apply Spandage size 7/8  - Apply 1/4 or 1/2 Drawtex pad  - Secure with roll gauze & tape  - Apply TCC-Essity  Change weekly at [facility]        TCC-Essity: The TCC-Essity Total Contact Cast that has been placed on your foot and leg by your doctor has been proven to be an effective method of off-loading your foot. It is important that you understand potential problems with the cast so they can be avoided. Please follow the instructions below during your care.  1. Limit activity for first 24 hours after cast is applied  2. The outer boot MUST always be worn when walking.  3. Do not insert anything under the cast. Injury to your skin can be very dangerous.  4. Subsequent cast changes will take place from 1-2 times per week as instructed.  5. . If the cast is \"loose\" or \"rubbing\" or causing pain, please call us- it may need to be changed. Or if you develop a fever, chills, nausea, or vomiting the cast must be removed to check the wound  6. Keep your cast dry. If the cast gets wet, it must be changed immediately. (Use a " protective cast bag when you are bathing.)  7. If the cast is removed in the hospital or ER the cast technician must be notified there is only padding on the front over the shin, on the ankles and over the foot and toes.   8. You will need to wear a cast for 1-2 additional weeks after your wound has healed or until your skin has returned to normal thickness.  9. If the Clinic is closed proceed to the nearest emergency room or urgent care  This is a non-traditional cast and must be removed in a different manner.   10. Call clinic with any problems with your cast. Our phone number is [phone]. After hours please phone your PCP or go to the nearest ER.       Protein:  A diet high in protein is important for wound healing, we recommend getting up to 90 grams of protein per day.  Taking protein shakes or bars are a good way to get extra protein in your diet.    Good sources of protein:  Pork 26g per 3 oz  Whey protein powder - 24g per scoop (on average)  Greek yogurt - 23g per 8oz  Chicken or Turkey - 23g per 3oz  Fish - 20-25g per 3oz  Beef - 18-23g per 3oz  Navy beans - 20g per cup  Cottage cheese - 14g per 1/2 cup  Lentils - 13g per 1/4 cup  Beef jerky 13g per 1oz  2% milk - 8g per cup  Peanut butter - 8g per 2 tablespoons  Eggs - 6g per egg  Mixed nuts - 6g per 2oz         Main Provider: Alyssa Rosenthal PA-C February 20, 2024    Call us at [phone] if you have any questions about your wounds, have redness or swelling around your wound, have a fever of 101 degrees Fahrenheit or greater or if you have any other problems or concerns. We answer the phone Monday through Friday 8 am to 4 pm, please leave a message as we check the voicemail frequently throughout the day.     If you had a positive experience please indicate that on your patient satisfaction survey form that [facility] will be sending you.    It was a pleasure meeting with you today.  Thank you for allowing me and my team the privilege of  caring for you today.  YOU are the reason we are here, and I truly hope we provided you with the excellent service you deserve.  Please let us know if there is anything else we can do for you so that we can be sure you are leaving completely satisfied with your care experience.      If you have any billing related questions please call the [facility] office at [phone]. The clinic staff does not handle billing related matters.    If you are scheduled to have a follow up appointment, you will receive a reminder call the day before your visit. On the appointment day please arrive 15 minutes prior to your appointment time. If you are unable to keep that appointment, please call the clinic to cancel or reschedule. If you are more than 10 minutes late or greater for your scheduled appointment time, the clinic policy is that you may be asked to reschedule.

## 2024-02-21 ENCOUNTER — TRANSFERRED RECORDS (OUTPATIENT)
Dept: SURGERY | Facility: CLINIC | Age: 64
End: 2024-02-21
Payer: COMMERCIAL

## 2024-02-23 ENCOUNTER — HOSPITAL ENCOUNTER (OUTPATIENT)
Dept: WOUND CARE | Facility: CLINIC | Age: 64
Discharge: HOME | End: 2024-02-23
Attending: PHYSICIAN ASSISTANT | Admitting: PHYSICIAN ASSISTANT
Payer: COMMERCIAL

## 2024-02-23 VITALS
RESPIRATION RATE: 16 BRPM | DIASTOLIC BLOOD PRESSURE: 77 MMHG | SYSTOLIC BLOOD PRESSURE: 172 MMHG | HEART RATE: 52 BPM | TEMPERATURE: 98.1 F

## 2024-02-23 DIAGNOSIS — L97.522 DIABETIC ULCER OF OTHER PART OF LEFT FOOT ASSOCIATED WITH TYPE 2 DIABETES MELLITUS, WITH FAT LAYER EXPOSED (H): Primary | ICD-10-CM

## 2024-02-23 DIAGNOSIS — E11.621 DIABETIC ULCER OF OTHER PART OF LEFT FOOT ASSOCIATED WITH TYPE 2 DIABETES MELLITUS, WITH FAT LAYER EXPOSED (H): Primary | ICD-10-CM

## 2024-02-23 PROCEDURE — 29445 APPL RIGID TOT CNTC LEG CAST: CPT | Mod: LT | Performed by: PHYSICIAN ASSISTANT

## 2024-02-23 PROCEDURE — 29445 APPL RIGID TOT CNTC LEG CAST: CPT | Performed by: PHYSICIAN ASSISTANT

## 2024-02-23 PROCEDURE — 99214 OFFICE O/P EST MOD 30 MIN: CPT | Mod: 25 | Performed by: PHYSICIAN ASSISTANT

## 2024-02-23 RX ORDER — GENTAMICIN SULFATE 1 MG/G
OINTMENT TOPICAL
Qty: 30 G | Refills: 2 | Status: SHIPPED | OUTPATIENT
Start: 2024-02-23 | End: 2024-07-28

## 2024-02-23 NOTE — ADDENDUM NOTE
Encounter addended by: Alyssa Rosenthal PA-C on: 2/23/2024 10:34 AM   Actions taken: Charge Capture section accepted

## 2024-02-23 NOTE — PROGRESS NOTES
"[facility]    ASSESSMENT:   Pseudomonas critical colonization   Diabetic ulcer of other part of left foot associated with diabetes mellitus due to underlying condition, with fat layer exposed     PLAN/DISCUSSION:   Start cipro empirically, culture pending  Applied Essity cast today as he cracked the TCCEZ cast due to increased strength of the Essity cast, encouraged less ambulation but doubtful patient can comply with that  Wound care plan: acticoat 7 as primary beneath TCC system. Dressing will be performed by facility staff See bottom of note for detailed wound care and patient instructions    INTERVAL HX:  2/20: Rets to for cast change. TCC EZ cracked today at ankle. Happened yesterday during Pilates. States he has already slowed down as much as he can. Has bright green drainage and hx of resistant pseudomonas. States his ankle is slightly more swollen than normal which can be a sign of infection. No pain, no blood sugar issues, no f/c/n/v.     HISTORY OF PRESENT ILLNESS:   Patient was seen at wound center for L foot wound. Previously followed by Dr. Soto. Per her note: \"He now follows up for his left foot, following second metatarsal resection on 10/19. Had an MRI that confirmed osteomyelitis and was scheduled for surgery quickly afterwards. Still takes augmentin long term per Id for chronic osteomyelitis.\"    2/1: TCC EZ started weekly.      VITALS: BP (!) 144/80 (BP Location: Right arm, Patient Position: Chair, Cuff Size: Adult Regular)   Pulse 62   Temp 98.3  F (36.8  C) (Temporal)   Resp 20      PHYSICAL EXAM:  GENERAL: Patient is alert and oriented and in no acute distress  INTEGUMENTARY:   Wound (used by OP [facility] only) 09/25/23 1505 Left plantar foot surgical (Active)   Thickness/Stage full thickness 02/20/24 1314   Base pink 02/20/24 1314   Periwound intact;macerated 02/20/24 1314   Periwound Temperature warm 02/20/24 1314   Periwound Skin Turgor soft 02/20/24 1314   Edges " callused 02/20/24 1314   Length (cm) 1.5 02/20/24 1314   Width (cm) 1.5 02/20/24 1314   Depth (cm) 0.3 02/20/24 1314   Wound (cm^2) 2.25 cm^2 02/20/24 1314   Wound Volume (cm^3) 0.68 cm^3 02/20/24 1314   Wound healing % -294.74 02/20/24 1314   Drainage Characteristics/Odor serosanguineous 02/20/24 1314   Drainage Amount moderate 02/20/24 1314   Care, Wound debrided 02/12/24 1343       MDM: 30 minutes were spent on the date of the visit reviewing previous chart notes, evaluating patient and developing the treatment plan, this excludes any time spent on procedures.     PROCEDURE (NO CHARGE): 4% topical lidocaine was applied to the wound by the nursing staff. Patient was determined to be capable of making their own medical decisions and informed consent was obtained. Using a 15 blade a selective debridement of non-viable tissue was performed of <20cm2. Hemostasis was achieved with pressure. The patient tolerated the procedure well.  TCC Essity applied after wound was dressed.       PATIENT INSTRUCTIONS      Further instructions from your care team         Crispin Rojas      1960    A DME order was not completed because the patient is having dressing changes done at [facility]. PLEASE BRING ANY SUPPLIES THAT WERE GIVEN TO YOU WITH YOU TO YOUR NEXT VISIT.    PLAN:  Continue to wear cast protector in shower  Continue to minimize walking/weight bearing  Keep blood sugars below 150 and A1C below 7  Whenever you sit, raise your ankle above your hips to promote wound healing  Prescription sent for Cipro - hold your Multi-vitamin and Magnesium while   Reach out to [facility] to get new orthotics (order sent 09/2023): # [phone]      Wound Dressing Change: left plantar foot  - Wash your hands with soap and water before you begin your dressing change and prepare a clean surface for dressings.   - Prophase protocol  - Apply small amount of Betadine - allow to dry  - Apply CriticAid paste to periwound skin  - Apply Atractain  "to skin otherwise  - Apply Acticoat 7  - Apply Spandage size 7/8  - Apply 1/4 or 1/2 Drawtex pad  - Secure with roll gauze & tape  - Apply TCC-Essity  Change weekly at [facility]        TCC-Essity: The TCC-Essity Total Contact Cast that has been placed on your foot and leg by your doctor has been proven to be an effective method of off-loading your foot. It is important that you understand potential problems with the cast so they can be avoided. Please follow the instructions below during your care.  1. Limit activity for first 24 hours after cast is applied  2. The outer boot MUST always be worn when walking.  3. Do not insert anything under the cast. Injury to your skin can be very dangerous.  4. Subsequent cast changes will take place from 1-2 times per week as instructed.  5. . If the cast is \"loose\" or \"rubbing\" or causing pain, please call us- it may need to be changed. Or if you develop a fever, chills, nausea, or vomiting the cast must be removed to check the wound  6. Keep your cast dry. If the cast gets wet, it must be changed immediately. (Use a protective cast bag when you are bathing.)  7. If the cast is removed in the hospital or ER the cast technician must be notified there is only padding on the front over the shin, on the ankles and over the foot and toes.   8. You will need to wear a cast for 1-2 additional weeks after your wound has healed or until your skin has returned to normal thickness.  9. If the Clinic is closed proceed to the nearest emergency room or urgent care  This is a non-traditional cast and must be removed in a different manner.   10. Call clinic with any problems with your cast. Our phone number is [phone]. After hours please phone your PCP or go to the nearest ER.       Protein:  A diet high in protein is important for wound healing, we recommend getting up to 90 grams of protein per day.  Taking protein shakes or bars are a good way to get extra protein in your diet.    Good " sources of protein:  Pork 26g per 3 oz  Whey protein powder - 24g per scoop (on average)  Greek yogurt - 23g per 8oz  Chicken or Turkey - 23g per 3oz  Fish - 20-25g per 3oz  Beef - 18-23g per 3oz  Navy beans - 20g per cup  Cottage cheese - 14g per 1/2 cup  Lentils - 13g per 1/4 cup  Beef jerky 13g per 1oz  2% milk - 8g per cup  Peanut butter - 8g per 2 tablespoons  Eggs - 6g per egg  Mixed nuts - 6g per 2oz         Main Provider: Alyssa Rosenthal PA-C February 20, 2024    Call us at [phone] if you have any questions about your wounds, have redness or swelling around your wound, have a fever of 101 degrees Fahrenheit or greater or if you have any other problems or concerns. We answer the phone Monday through Friday 8 am to 4 pm, please leave a message as we check the voicemail frequently throughout the day.     If you had a positive experience please indicate that on your patient satisfaction survey form that [facility] will be sending you.    It was a pleasure meeting with you today.  Thank you for allowing me and my team the privilege of caring for you today.  YOU are the reason we are here, and I truly hope we provided you with the excellent service you deserve.  Please let us know if there is anything else we can do for you so that we can be sure you are leaving completely satisfied with your care experience.      If you have any billing related questions please call the [facility] Business office at [phone]. The clinic staff does not handle billing related matters.    If you are scheduled to have a follow up appointment, you will receive a reminder call the day before your visit. On the appointment day please arrive 15 minutes prior to your appointment time. If you are unable to keep that appointment, please call the clinic to cancel or reschedule. If you are more than 10 minutes late or greater for your scheduled appointment time, the clinic policy is that you may be asked to  reschedule.

## 2024-02-23 NOTE — DISCHARGE INSTRUCTIONS
"Crispin Rojas      1960    A DME order was not completed because the patient is having dressing changes done at [facility]. PLEASE BRING ANY SUPPLIES THAT WERE GIVEN TO YOU WITH YOU TO YOUR NEXT VISIT.    PLAN:  Continue to wear cast protector in shower  Continue to minimize walking/weight bearing  Keep blood sugars below 150 and A1C below 7  Whenever you sit, raise your ankle above your hips to promote wound healing  STOP Cipro - Ok to restart Multi-vitamin and Magnesium  [facility] to get new [facility] (order sent 09/2023): # [phone]  Application for 3C patch submitted to insurance (2/23/24)       Wound Dressing Change: Left Plantar Foot  - Wash your hands with soap and water before you begin your dressing change and prepare a clean surface for dressings.   - Prophase protocol  - Apply small amount of Betadine - allow to dry  - Apply a thin layer of Gentamicin ointment to wound bed and shantelle-wound  - Apply CriticAid paste to periwound intact skin  - Apply Atractain to skin otherwise  - Apply Acticoat 7  - Apply Spandage size 7/8  - Apply 3 x 4 inch strips of Drawtex roll  - Secure with roll gauze & tape  - Apply TCC-Essity  Change weekly at [facility]        TCC-Essity: The TCC-Essity Total Contact Cast that has been placed on your foot and leg by your doctor has been proven to be an effective method of off-loading your foot. It is important that you understand potential problems with the cast so they can be avoided. Please follow the instructions below during your care.  1. Limit activity for first 24 hours after cast is applied  2. The outer boot MUST always be worn when walking.  3. Do not insert anything under the cast. Injury to your skin can be very dangerous.  4. Subsequent cast changes will take place from 1-2 times per week as instructed.  5. . If the cast is \"loose\" or \"rubbing\" or causing pain, please call us- it may need to be changed. Or if you develop a fever, chills, nausea, or vomiting " the cast must be removed to check the wound  6. Keep your cast dry. If the cast gets wet, it must be changed immediately. (Use a protective cast bag when you are bathing.)  7. If the cast is removed in the hospital or ER the cast technician must be notified there is only padding on the front over the shin, on the ankles and over the foot and toes.   8. You will need to wear a cast for 1-2 additional weeks after your wound has healed or until your skin has returned to normal thickness.  9. If the Clinic is closed proceed to the nearest emergency room or urgent care  This is a non-traditional cast and must be removed in a different manner.   10. Call clinic with any problems with your cast. Our phone number is [phone]. After hours please phone your PCP or go to the nearest ER.      Protein:  A diet high in protein is important for wound healing, we recommend getting up to 90 grams of protein per day.  Taking protein shakes or bars are a good way to get extra protein in your diet.     Good sources of protein:  Pork 26g per 3 oz  Whey protein powder - 24g per scoop (on average)  Greek yogurt - 23g per 8oz  Chicken or Turkey - 23g per 3oz  Fish - 20-25g per 3oz  Beef - 18-23g per 3oz  Navy beans - 20g per cup  Cottage cheese - 14g per 1/2 cup  Lentils - 13g per 1/4 cup  Beef jerky 13g per 1oz  2% milk - 8g per cup  Peanut butter - 8g per 2 tablespoons  Eggs - 6g per egg  Mixed nuts - 6g per 2oz     Main Provider: Alyssa Rosenthal PA-C February 23, 2024        Call us at [phone] if you have any questions about your wounds, have redness or swelling around your wound, have a fever of 101 degrees Fahrenheit or greater or if you have any other problems or concerns. We answer the phone Monday through Friday 8 am to 4 pm, please leave a message as we check the voicemail frequently throughout the day.     If you had a positive experience please indicate that on your patient satisfaction survey form that [facility] will be sending you.    It was a pleasure meeting with you today.  Thank you for allowing me and my team the privilege of caring for you today.  YOU are the reason we are here, and I truly hope we provided you with the excellent service you deserve.  Please let us know if there is anything else we can do for you so that we can be sure you are leaving completely satisfied with your care experience.      If you have any billing related questions please call the [facility] Business office at [phone]. The clinic staff does not handle billing related matters.    If you are scheduled to have a follow up appointment, you will receive a reminder call the day before your visit. On the appointment day please arrive 15 minutes prior to your appointment time. If you are unable to keep that appointment, please call the clinic to cancel or reschedule. If you are more than 10 minutes late or greater for your scheduled appointment time, the clinic policy is that you may be asked to reschedule.

## 2024-02-24 LAB
BACTERIA WND CULT: ABNORMAL

## 2024-02-27 NOTE — PROGRESS NOTES
"[facility]    ASSESSMENT:   Pseudomonas critical colonization - resistant to any oral antibiotics    Diabetic ulcer of other part of left foot associated with diabetes mellitus due to underlying condition, with fat layer exposed - improving with TCC    PLAN/DISCUSSION:   Run PA for Epifix and 3 C  Can stop cipro as pseudomonas resistant, will attempt to treat topically with gent ointment and silver dressing, if wound stalls and or infection becomes more problematic we will have low threshold of getting back into ID  Repplied Essity cast today as he previously cracked the TCCEZ due to level of activity  Wound care plan: gent to wound and periwound, acticoat 7 as primary beneath TCC system. Dressing will be performed by facility staff See bottom of note for detailed wound care and patient instructions    INTERVAL HX:  2/23: Rets to clinic. Wound improved and green drainage lessened. Brought in gentamicin ointment I prescribed per culture results, plan to apply today. No issues with Essity cast but he did complain it kept him less active.     HISTORY OF PRESENT ILLNESS:   Patient was seen at wound center for L foot wound. Previously followed by Dr. Soto. Per her note: \"He now follows up for his left foot, following second metatarsal resection on 10/19. Had an MRI that confirmed osteomyelitis and was scheduled for surgery quickly afterwards. Still takes augmentin long term per Id for chronic osteomyelitis.\"    2/1: TCC EZ started weekly.     2/20: Rets to for cast change. TCC EZ cracked at ankle. Replaced with Essity cast system and ActiCoat 7 as primary dressing. Green drainage so started on cipro and cultured. Culture came back resistant to orals.     VITALS: BP (!) 172/77 (BP Location: Left arm, Patient Position: Chair)   Pulse 52   Temp 98.1  F (36.7  C) (Temporal)   Resp 16      PHYSICAL EXAM:  GENERAL: Patient is alert and oriented and in no acute distress  INTEGUMENTARY:   Wound (used " by OP [facility] only) 09/25/23 1505 Left plantar foot surgical (Active)   Thickness/Stage full thickness 02/23/24 1328   Base pink 02/23/24 1328   Periwound intact;macerated 02/23/24 1328   Periwound Temperature warm 02/23/24 1328   Periwound Skin Turgor soft 02/23/24 1328   Edges callused 02/23/24 1328   Length (cm) 1.6 02/23/24 1328   Width (cm) 1.4 02/23/24 1328   Depth (cm) 0.3 02/23/24 1328   Wound (cm^2) 2.24 cm^2 02/23/24 1328   Wound Volume (cm^3) 0.67 cm^3 02/23/24 1328   Wound healing % -292.98 02/23/24 1328   Drainage Characteristics/Odor serosanguineous 02/23/24 1328   Drainage Amount moderate 02/23/24 1328   Care, Wound debrided 02/23/24 1328           MDM: 30 minutes were spent on the date of the visit reviewing previous chart notes, evaluating patient and developing the treatment plan, this excludes any time spent on procedures.     PROCEDURE (NO CHARGE for DEBRIDEMENT): 4% topical lidocaine was applied to the wound by the nursing staff. Patient was determined to be capable of making their own medical decisions and informed consent was obtained. Using a 15 blade a selective debridement of non-viable tissue was performed of <20cm2. Hemostasis was achieved with pressure. The patient tolerated the procedure well.  TCC Essity applied after wound was dressed.       PATIENT INSTRUCTIONS      Further instructions from your care team         Crispin Rojas      1960    A DME order was not completed because the patient is having dressing changes done at [facility]. PLEASE BRING ANY SUPPLIES THAT WERE GIVEN TO YOU WITH YOU TO YOUR NEXT VISIT.    PLAN:  Continue to wear cast protector in shower  Continue to minimize walking/weight bearing  Keep blood sugars below 150 and A1C below 7  Whenever you sit, raise your ankle above your hips to promote wound healing  STOP Cipro - Ok to restart Multi-vitamin and Magnesium  Reach out to [facility] to get new orthotics (order sent 09/2023): # [phone]  Application for   "patch submitted to insurance (2/23/24)       Wound Dressing Change: Left Plantar Foot  - Wash your hands with soap and water before you begin your dressing change and prepare a clean surface for dressings.   - Prophase protocol  - Apply small amount of Betadine - allow to dry  - Apply a thin layer of Gentamicin ointment to wound bed and shantelle-wound  - Apply CriticAid paste to periwound intact skin  - Apply Atractain to skin otherwise  - Apply Acticoat 7  - Apply Spandage size 7/8  - Apply 3 x 4 inch strips of Drawtex roll  - Secure with roll gauze & tape  - Apply TCC-Essity  Change weekly at [facility]        TCC-Essity: The TCC-Essity Total Contact Cast that has been placed on your foot and leg by your doctor has been proven to be an effective method of off-loading your foot. It is important that you understand potential problems with the cast so they can be avoided. Please follow the instructions below during your care.  1. Limit activity for first 24 hours after cast is applied  2. The outer boot MUST always be worn when walking.  3. Do not insert anything under the cast. Injury to your skin can be very dangerous.  4. Subsequent cast changes will take place from 1-2 times per week as instructed.  5. . If the cast is \"loose\" or \"rubbing\" or causing pain, please call us- it may need to be changed. Or if you develop a fever, chills, nausea, or vomiting the cast must be removed to check the wound  6. Keep your cast dry. If the cast gets wet, it must be changed immediately. (Use a protective cast bag when you are bathing.)  7. If the cast is removed in the hospital or ER the cast technician must be notified there is only padding on the front over the shin, on the ankles and over the foot and toes.   8. You will need to wear a cast for 1-2 additional weeks after your wound has healed or until your skin has returned to normal thickness.  9. If the Clinic is closed proceed to the nearest emergency room or urgent care  This is a " non-traditional cast and must be removed in a different manner.   10. Call clinic with any problems with your cast. Our phone number is [phone]. After hours please phone your PCP or go to the nearest ER.      Protein:  A diet high in protein is important for wound healing, we recommend getting up to 90 grams of protein per day.  Taking protein shakes or bars are a good way to get extra protein in your diet.     Good sources of protein:  Pork 26g per 3 oz  Whey protein powder - 24g per scoop (on average)  Greek yogurt - 23g per 8oz  Chicken or Turkey - 23g per 3oz  Fish - 20-25g per 3oz  Beef - 18-23g per 3oz  Navy beans - 20g per cup  Cottage cheese - 14g per 1/2 cup  Lentils - 13g per 1/4 cup  Beef jerky 13g per 1oz  2% milk - 8g per cup  Peanut butter - 8g per 2 tablespoons  Eggs - 6g per egg  Mixed nuts - 6g per 2oz     Main Provider: Alyssa Rosenthal PA-C February 23, 2024        Call us at [phone] if you have any questions about your wounds, have redness or swelling around your wound, have a fever of 101 degrees Fahrenheit or greater or if you have any other problems or concerns. We answer the phone Monday through Friday 8 am to 4 pm, please leave a message as we check the voicemail frequently throughout the day.     If you had a positive experience please indicate that on your patient satisfaction survey form that [facility] will be sending you.    It was a pleasure meeting with you today.  Thank you for allowing me and my team the privilege of caring for you today.  YOU are the reason we are here, and I truly hope we provided you with the excellent service you deserve.  Please let us know if there is anything else we can do for you so that we can be sure you are leaving completely satisfied with your care experience.      If you have any billing related questions please call the [facility] Business office at [phone]. The clinic staff does not handle billing related matters.    If you are  scheduled to have a follow up appointment, you will receive a reminder call the day before your visit. On the appointment day please arrive 15 minutes prior to your appointment time. If you are unable to keep that appointment, please call the clinic to cancel or reschedule. If you are more than 10 minutes late or greater for your scheduled appointment time, the clinic policy is that you may be asked to reschedule.

## 2024-02-29 ENCOUNTER — HOSPITAL ENCOUNTER (OUTPATIENT)
Dept: WOUND CARE | Facility: CLINIC | Age: 64
Discharge: HOME | End: 2024-02-29
Attending: PHYSICIAN ASSISTANT
Payer: COMMERCIAL

## 2024-02-29 VITALS — SYSTOLIC BLOOD PRESSURE: 149 MMHG | HEART RATE: 53 BPM | TEMPERATURE: 98.3 F | DIASTOLIC BLOOD PRESSURE: 77 MMHG

## 2024-02-29 DIAGNOSIS — L97.522 DIABETIC ULCER OF OTHER PART OF LEFT FOOT ASSOCIATED WITH TYPE 2 DIABETES MELLITUS, WITH FAT LAYER EXPOSED (H): Primary | ICD-10-CM

## 2024-02-29 DIAGNOSIS — E11.621 DIABETIC ULCER OF OTHER PART OF LEFT FOOT ASSOCIATED WITH TYPE 2 DIABETES MELLITUS, WITH FAT LAYER EXPOSED (H): Primary | ICD-10-CM

## 2024-02-29 PROCEDURE — 99214 OFFICE O/P EST MOD 30 MIN: CPT | Mod: 25

## 2024-02-29 PROCEDURE — 29445 APPL RIGID TOT CNTC LEG CAST: CPT

## 2024-02-29 PROCEDURE — 97597 DBRDMT OPN WND 1ST 20 CM/<: CPT | Mod: XU

## 2024-02-29 NOTE — PROGRESS NOTES
"[facility]    ASSESSMENT:    Diabetic ulcer of other part of left foot associated with type 2 diabetes mellitus, with fat layer exposed (H)-improved    PLAN/DISCUSSION:   Discussed 20% copay for 3C, unsure of patient's true cost, dependent on insurance. He was agreeable to whatever cost he has to pay and wants to proceed with 3C patch. However, given his low platelets and on DOAC, unable to form 3C patch. Therefore unable to do. Will proceed with Epifix once approved by insurance.   Wound care plan: gent to wound and periwound, acticoat 7 as primary beneath Essity system.  Dressing will be performed by facility staff See bottom of note for detailed wound care and patient instructions  Dietary recommendations discussed, see AVS   Reapplied Essity cast today.     Interval Hx: Wound bed appears . Unable to do 3C patch given low platelets and DOAC. Continue Gentamicin ointment and Essity cast.     HISTORY OF PRESENT ILLNESS:   Patient was seen at wound center for L foot wound. Previously followed by Dr. Soto. Per her note: \"He now follows up for his left foot, following second metatarsal resection on 10/19. Had an MRI that confirmed osteomyelitis and was scheduled for surgery quickly afterwards. Still takes augmentin long term per Id for chronic osteomyelitis.\"     2/1: TCC EZ started weekly.      2/20: Rets to for cast change. TCC EZ cracked at ankle. Replaced with Essity cast system and ActiCoat 7 as primary dressing. Green drainage so started on cipro and cultured. Culture came back resistant to orals.  2/23: Rets to clinic. Wound improved and green drainage lessened. Brought in gentamicin ointment I prescribed per culture results, plan to apply today. No issues with Essity cast but he did complain it kept him less active.      Patient Active Problem List   Diagnosis    Calculus of kidney    Chronic left shoulder pain    Type 2 diabetes mellitus with peripheral neuropathy (H)    " Hyperlipidemia LDL goal <70    Essential hypertension    Right bundle branch block    GILA (obstructive sleep apnea)    Diabetic ulcer of lower extremity (H)    PVD (peripheral vascular disease) (H24)    Scoliosis of thoracic spine, unspecified scoliosis type    Other sequelae following unspecified cerebrovascular disease    Cervical pain    Bilateral thoracic back pain    Osteomyelitis of great toe of right foot (H)    Acute deep vein thrombosis (DVT) of tibial vein of right lower extremity (H)    Abnormal CT of liver    Benign neoplasm of transverse colon    Duodenitis    Hemorrhoids    Polyp of colon    Acute osteomyelitis of right foot (H)    Essential tremor    Nonalcoholic steatohepatitis    Otalgia of left ear    Shoulder pain    Liver cirrhosis secondary to SNYDER (H)    Gastro-esophageal reflux disease with esophagitis    Chronic osteomyelitis of right foot (H)    Chronic foot ulcer with fat layer exposed, left (H)    Diabetic ulcer of left foot with fat layer exposed (H)    Class 2 severe obesity due to excess calories with serious comorbidity in adult (H)     MEDICATIONS:   Current Outpatient Medications   Medication    amLODIPine (NORVASC) 5 MG tablet    amoxicillin-clavulanate (AUGMENTIN) 875-125 MG tablet    atorvastatin (LIPITOR) 40 MG tablet    blood glucose (NO BRAND SPECIFIED) lancets standard    calcium carbonate (OS-NARA) 500 MG tablet    Cholecalciferol (VITAMIN D3) 25 MCG (1000 UT) CAPS    Co-Enzyme Q10 100 MG CAPS    Continuous Blood Gluc Sensor (FREESTYLE LUCERO 14 DAY SENSOR) MISC    ELIQUIS ANTICOAGULANT 5 MG tablet    ferrous sulfate (FEROSUL) 325 (65 Fe) MG tablet    gabapentin (NEURONTIN) 100 MG capsule    gentamicin (GARAMYCIN) 0.1 % external ointment    hydrochlorothiazide (MICROZIDE) 12.5 MG capsule    Insulin Aspart FlexPen 100 UNIT/ML SOPN    insulin degludec (TRESIBA FLEXTOUCH) 100 UNIT/ML pen    insulin pen needle (B-D U/F) 31G X 5 MM miscellaneous    ketoconazole (NIZORAL) 2 %  external shampoo    lisinopril (ZESTRIL) 40 MG tablet    MAGNESIUM GLYCINATE PLUS PO    metFORMIN (GLUCOPHAGE XR) 500 MG 24 hr tablet    Multiple Vitamins-Minerals (MULTIVITAMIN PO)    mupirocin (BACTROBAN) 2 % external ointment    Omega-3 Fatty Acids (OMEGA-3 FISH OIL PO)    omeprazole (PRILOSEC) 40 MG DR capsule    propranolol (INDERAL) 20 MG tablet    Semaglutide, 2 MG/DOSE, (OZEMPIC) 8 MG/3ML pen    tadalafil (CIALIS) 5 MG tablet     No current facility-administered medications for this encounter.       VITALS: BP (!) 149/77 (BP Location: Right arm, Patient Position: Sitting, Cuff Size: Adult Regular)   Pulse 53   Temp 98.3  F (36.8  C) (Temporal)      PHYSICAL EXAM:  GENERAL: Patient is alert and oriented and in no acute distress  INTEGUMENTARY:   Wound (used by OP [facility] only) 09/25/23 1505 Left plantar foot surgical (Active)   Thickness/Stage full thickness 02/29/24 1331   Base pink 02/29/24 1331   Periwound intact;macerated 02/29/24 1331   Periwound Temperature warm 02/29/24 1331   Periwound Skin Turgor soft 02/29/24 1331   Edges callused 02/29/24 1331   Length (cm) 1.5 02/29/24 1331   Width (cm) 1.5 02/29/24 1331   Depth (cm) 0.1 02/29/24 1331   Wound (cm^2) 2.25 cm^2 02/29/24 1331   Wound Volume (cm^3) 0.23 cm^3 02/29/24 1331   Wound healing % -294.74 02/29/24 1331   Drainage Characteristics/Odor serosanguineous 02/29/24 1331   Drainage Amount moderate 02/29/24 1331   Care, Wound debrided 02/29/24 1331           PROCEDURES: 4% topical lidocaine was applied to the wound by the nursing staff. Patient was determined to be capable of making their own medical decisions and informed consent was obtained. Using a curette a selective debridement of non-viable tissue was performed of <20cm2. Hemostasis was achieved with pressure. The patient tolerated the procedure well.      MDM: 60 minutes were spent on the date of the visit reviewing previous chart notes, evaluating patient and developing the treatment plan, this  "excludes any time spent on procedures    PATIENT INSTRUCTIONS      Further instructions from your care team         Crispin Rojas      1960    A DME order was not completed because the patient is having dressing changes done at [facility]. PLEASE BRING ANY SUPPLIES THAT WERE GIVEN TO YOU WITH YOU TO YOUR NEXT VISIT.     PLAN:  Continue to wear cast protector in shower  Continue to minimize walking/weight bearing  Keep blood sugars below 150 and A1C below 7  Whenever you sit, raise your ankle above your hips to promote wound healing  Reach out to [facility] to get new orthotics (order sent 09/2023): # [phone]  Application for 3C patch submitted to insurance (2/23/24)        Wound Dressing Change: Left Plantar Foot  - Wash your hands with soap and water before you begin your dressing change and prepare a clean surface for dressings.   - Prophase protocol  - Apply small amount of Betadine - allow to dry  - Apply a thin layer of Gentamicin ointment to wound bed and shantelle-wound  - Apply CriticAid paste to periwound intact skin  - Apply Atractain to skin otherwise  - Apply Acticoat 7  - Apply Spandage size 7/8  - Apply 3 x 4 inch strips of Drawtex roll  - Secure with roll gauze & tape  - Apply TCC-Essity  Change weekly at [facility]        TCC-Essity: The TCC-Essity Total Contact Cast that has been placed on your foot and leg by your doctor has been proven to be an effective method of off-loading your foot. It is important that you understand potential problems with the cast so they can be avoided. Please follow the instructions below during your care.  1. Limit activity for first 24 hours after cast is applied  2. The outer boot MUST always be worn when walking.  3. Do not insert anything under the cast. Injury to your skin can be very dangerous.  4. Subsequent cast changes will take place from 1-2 times per week as instructed.  5. . If the cast is \"loose\" or \"rubbing\" or causing pain, please call us- it may need to be " changed. Or if you develop a fever, chills, nausea, or vomiting the cast must be removed to check the wound  6. Keep your cast dry. If the cast gets wet, it must be changed immediately. (Use a protective cast bag when you are bathing.)  7. If the cast is removed in the hospital or ER the cast technician must be notified there is only padding on the front over the shin, on the ankles and over the foot and toes.   8. You will need to wear a cast for 1-2 additional weeks after your wound has healed or until your skin has returned to normal thickness.  9. If the Clinic is closed proceed to the nearest emergency room or urgent care  This is a non-traditional cast and must be removed in a different manner.   10. Call clinic with any problems with your cast. Our phone number is [phone]. After hours please phone your PCP or go to the nearest ER.      Protein:  A diet high in protein is important for wound healing, we recommend getting up to 90 grams of protein per day.  Taking protein shakes or bars are a good way to get extra protein in your diet.     Good sources of protein:  Pork 26g per 3 oz  Whey protein powder - 24g per scoop (on average)  Greek yogurt - 23g per 8oz  Chicken or Turkey - 23g per 3oz  Fish - 20-25g per 3oz  Beef - 18-23g per 3oz  Navy beans - 20g per cup  Cottage cheese - 14g per 1/2 cup  Lentils - 13g per 1/4 cup  Beef jerky 13g per 1oz  2% milk - 8g per cup  Peanut butter - 8g per 2 tablespoons  Eggs - 6g per egg  Mixed nuts - 6g per 2oz      Main Provider: Colette Fisher NP February 29, 2024    Call us at [phone] if you have any questions about your wounds, have redness or swelling around your wound, have a fever of 101 degrees Fahrenheit or greater or if you have any other problems or concerns. We answer the phone Monday through Friday 8 am to 4 pm, please leave a message as we check the voicemail frequently throughout the day.     If you had a positive experience please indicate that on  your patient satisfaction survey form that [facility] will be sending you.    It was a pleasure meeting with you today.  Thank you for allowing me and my team the privilege of caring for you today.  YOU are the reason we are here, and I truly hope we provided you with the excellent service you deserve.  Please let us know if there is anything else we can do for you so that we can be sure you are leaving completely satisfied with your care experience.      If you have any billing related questions please call the [facility] Business office at [phone]. The clinic staff does not handle billing related matters.    If you are scheduled to have a follow up appointment, you will receive a reminder call the day before your visit. On the appointment day please arrive 15 minutes prior to your appointment time. If you are unable to keep that appointment, please call the clinic to cancel or reschedule. If you are more than 10 minutes late or greater for your scheduled appointment time, the clinic policy is that you may be asked to reschedule.         Electronically signed by Colette Fisher CNP on February 29, 2024

## 2024-02-29 NOTE — DISCHARGE INSTRUCTIONS
"Crispin Rojas      1960    A DME order was not completed because the patient is having dressing changes done at [facility]. PLEASE BRING ANY SUPPLIES THAT WERE GIVEN TO YOU WITH YOU TO YOUR NEXT VISIT.     PLAN:  3C unable to be placed today on 2-29-24; plan for possible epifix in the future  Continue to wear cast protector in shower  Continue to minimize walking/weight bearing  Keep blood sugars below 150 and A1C below 7  Whenever you sit, raise your ankle above your hips to promote wound healing  [facility] to get new orthotics (order sent 09/2023): # [phone]       Wound Dressing Change: Left Plantar Foot  - Prophase protocol  - Cleanse with mild unscented soap (such as Cetaphil, Cerave or Dove) and water  - Apply small amount of VASHE on gauze, lay into wound bed, let sit for 10 minutes, remove gauze (do not rinse)   - Apply Atractain to intact skin  - Apply Acticoat 7 to wound bed  - Apply Spandage size 7  - Apply 3 x 4 inch strips of Drawtex roll  - Secure with roll gauze & tape  - Apply TCC-Essity  Change weekly at [facility]     TCC-Essity:   The TCC-Essity Total Contact Cast that has been placed on your foot and leg by your doctor has been proven to be an effective method of off-loading your foot. It is important that you understand potential problems with the cast so they can be avoided. Please follow the instructions below during your care.  1. Limit activity for first 24 hours after cast is applied  2. The outer boot MUST always be worn when walking.  3. Do not insert anything under the cast. Injury to your skin can be very dangerous.  4. Subsequent cast changes will take place from 1-2 times per week as instructed.  5. . If the cast is \"loose\" or \"rubbing\" or causing pain, please call us- it may need to be changed. Or if you develop a fever, chills, nausea, or vomiting the cast must be removed to check the wound  6. Keep your cast dry. If the cast gets wet, it must be changed immediately. (Use " a protective cast bag when you are bathing.)  7. If the cast is removed in the hospital or ER the cast technician must be notified there is only padding on the front over the shin, on the ankles and over the foot and toes.   8. You will need to wear a cast for 1-2 additional weeks after your wound has healed or until your skin has returned to normal thickness.  9. If the Clinic is closed proceed to the nearest emergency room or urgent care  This is a non-traditional cast and must be removed in a different manner.   10. Call clinic with any problems with your cast. Our phone number is [phone]. After hours please phone your PCP or go to the nearest ER.      Protein:  A diet high in protein is important for wound healing, we recommend getting up to 90 grams of protein per day.  Taking protein shakes or bars are a good way to get extra protein in your diet.  Good sources of protein:  Pork 26g per 3 oz  Whey protein powder - 24g per scoop (on average)  Greek yogurt - 23g per 8oz  Chicken or Turkey - 23g per 3oz  Fish - 20-25g per 3oz  Beef - 18-23g per 3oz  Navy beans - 20g per cup  Cottage cheese - 14g per 1/2 cup  Lentils - 13g per 1/4 cup  Beef jerky 13g per 1oz  2% milk - 8g per cup  Peanut butter - 8g per 2 tablespoons  Eggs - 6g per egg  Mixed nuts - 6g per 2oz      Main Provider: Colette Fisher NP February 29, 2024    Call us at [phone] if you have any questions about your wounds, have redness or swelling around your wound, have a fever of 101 degrees Fahrenheit or greater or if you have any other problems or concerns. We answer the phone Monday through Friday 8 am to 4 pm, please leave a message as we check the voicemail frequently throughout the day.     If you had a positive experience please indicate that on your patient satisfaction survey form that [facility] will be sending you.    It was a pleasure meeting with you today.  Thank you for allowing me and my team the privilege of caring for  you today.  YOU are the reason we are here, and I truly hope we provided you with the excellent service you deserve.  Please let us know if there is anything else we can do for you so that we can be sure you are leaving completely satisfied with your care experience.      If you have any billing related questions please call the [facility] Business office at [phone]. The clinic staff does not handle billing related matters.    If you are scheduled to have a follow up appointment, you will receive a reminder call the day before your visit. On the appointment day please arrive 15 minutes prior to your appointment time. If you are unable to keep that appointment, please call the clinic to cancel or reschedule. If you are more than 10 minutes late or greater for your scheduled appointment time, the clinic policy is that you may be asked to reschedule.

## 2024-03-01 ENCOUNTER — NURSE TRIAGE (OUTPATIENT)
Dept: NURSING | Facility: CLINIC | Age: 64
End: 2024-03-01
Payer: COMMERCIAL

## 2024-03-01 NOTE — TELEPHONE ENCOUNTER
Nurse Triage SBAR    Situation:   -Cast issues    Background:   -Patient calling, It is okay to leave a detailed message at this number.     Assessment:   -yesterday seen at wound clinic  -put cast on left foot (this is 4th cast)  -just got out of shower  -has a hole in the heal of the cast (unknown cause, unknown if caused by moisture or if it was there yesterday when it was placed)  -hole is 2 inch by 3/4 inch, with flap of the cast  -wound near toes (no pain, numbness/tingling, can move toes slightly (this is typical for him)  -outside of cast is wet near heal  -unknown if wet inside of the cast  -shower just got out of shower    Recommendation:   Call PCP Now   Care team: Patient is going to attempt to dry the cast. Dose he need to go into the ED to get it evaluated given there is a hole and possible moisture in the cast? (Routing to primary care and wound clinic is closed for the day)  -please call patient back and advise    MARIUM MILLER RN on 3/1/2024 at 4:35 PM     Reason for Disposition   Wet cast, questions about   [1] Caller has URGENT question AND [2] triager unable to answer question    Additional Information   Negative: [1] SEVERE pain (e.g., excruciating, pain scale 8-10) under cast AND [2] not improved after pain medications and elevation   Negative: [1] SEVERE pain of fingers or toes AND [2] not improved after pain medications and elevation   Negative: Chest pain   Negative: Difficulty breathing   Negative: Splint or elastic bandage problems or questions   Negative: Symptoms or questions after surgery   Negative: [1] Numbness (loss of sensation) of fingers or toes AND [2] persists after 1 hour of elevation   Negative: [1] Tingling (pins and needles sensation) of fingers or toes AND [2] persists after 1 hour of elevation   Negative: Blueness or pallor of fingers or toes (compared to non-injured side)   Negative: Patient sounds very sick or weak to the triager   Negative: [1] Increasing pain under  cast AND [2] cast put on > 24 hours ago AND [3] not improved after elevation   Negative: Can't wiggle fingers or toes   Negative: Drainage comes through cast or out of end of cast    Protocols used: Cast Symptoms and [id number]

## 2024-03-01 NOTE — TELEPHONE ENCOUNTER
Writer called patient, informed him of Dr. Theodore's advise. Patient states there is no pain, wet area is away from opening. Patient will follow up with wound clinic on Monday.

## 2024-03-01 NOTE — TELEPHONE ENCOUNTER
If the cast is dry at the site of the wound   He is okay to Monday .  However if cast is feeling wet at the site of wound or pain need to be seen in ER .    As I have not seen the wound difficult to provide full advise .  Recommend evaluation with wound clinic on Monday .    Sarah

## 2024-03-04 ENCOUNTER — TELEPHONE (OUTPATIENT)
Dept: WOUND CARE | Facility: CLINIC | Age: 64
End: 2024-03-04
Payer: COMMERCIAL

## 2024-03-04 ENCOUNTER — HOSPITAL ENCOUNTER (OUTPATIENT)
Dept: WOUND CARE | Facility: CLINIC | Age: 64
Discharge: HOME | End: 2024-03-04
Attending: PHYSICIAN ASSISTANT | Admitting: PHYSICIAN ASSISTANT
Payer: COMMERCIAL

## 2024-03-04 ENCOUNTER — TRANSFERRED RECORDS (OUTPATIENT)
Dept: HEALTH INFORMATION MANAGEMENT | Facility: CLINIC | Age: 64
End: 2024-03-04

## 2024-03-04 VITALS — TEMPERATURE: 98.1 F

## 2024-03-04 DIAGNOSIS — E08.621 DIABETIC ULCER OF LEFT HEEL ASSOCIATED WITH DIABETES MELLITUS DUE TO UNDERLYING CONDITION, WITH FAT LAYER EXPOSED (H): Primary | ICD-10-CM

## 2024-03-04 DIAGNOSIS — L97.422 DIABETIC ULCER OF LEFT HEEL ASSOCIATED WITH DIABETES MELLITUS DUE TO UNDERLYING CONDITION, WITH FAT LAYER EXPOSED (H): Primary | ICD-10-CM

## 2024-03-04 PROCEDURE — 29445 APPL RIGID TOT CNTC LEG CAST: CPT | Mod: LT,XU

## 2024-03-04 PROCEDURE — 99214 OFFICE O/P EST MOD 30 MIN: CPT | Mod: 25

## 2024-03-04 PROCEDURE — 97602 WOUND(S) CARE NON-SELECTIVE: CPT

## 2024-03-04 PROCEDURE — 29445 APPL RIGID TOT CNTC LEG CAST: CPT

## 2024-03-04 NOTE — DISCHARGE INSTRUCTIONS
"Crispin Rojas      1960    A DME order was not completed because the patient is having dressing changes done at [facility]. PLEASE BRING ANY SUPPLIES THAT WERE GIVEN TO YOU WITH YOU TO YOUR NEXT VISIT.     PLAN:  Plan for possible Epifix in the future  Continue to wear cast protector in shower  Continue to minimize walking/weight bearing  Keep blood sugars below 150 and A1C below 7  Whenever you sit, raise your ankle above your hips to promote wound healing  Reach out to [facility] to get new orthotics (order sent 09/2023): # [phone]        Wound Dressing Change: Left Plantar Foot  - Prophase protocol  - Cleanse with mild unscented soap (such as Cetaphil, Cerave or Dove) and water  - Apply small amount of 0.25% ACETIC ACID on gauze, lay into wound bed, let sit for 10 minutes, remove gauze (do not rinse)   - Apply Triad paste to periwound skin; Apply Atractain to intact skin otherwise  - Apply 2 layers Ioplex  - Apply Spandage size 7  - Apply 3 x 4 inch strips of Drawtex roll (3/4 added Exudry as well)  - Secure with roll gauze & tape  - Apply TCC-Essity  Change weekly at [facility]    Protein:  A diet high in protein is important for wound healing, we recommend getting up to 90 grams of protein per day.  Taking protein shakes or bars are a good way to get extra protein in your diet.     TCC-Essity:   The TCC-Essity Total Contact Cast that has been placed on your foot and leg by your doctor has been proven to be an effective method of off-loading your foot. It is important that you understand potential problems with the cast so they can be avoided. Please follow the instructions below during your care.  1. Limit activity for first 24 hours after cast is applied  2. The outer boot MUST always be worn when walking.  3. Do not insert anything under the cast. Injury to your skin can be very dangerous.  4. Subsequent cast changes will take place from 1-2 times per week as instructed.  5. . If the cast is \"loose\" or " "\"rubbing\" or causing pain, please call us- it may need to be changed. Or if you develop a fever, chills, nausea, or vomiting the cast must be removed to check the wound  6. Keep your cast dry. If the cast gets wet, it must be changed immediately. (Use a protective cast bag when you are bathing.)  7. If the cast is removed in the hospital or ER the cast technician must be notified there is only padding on the front over the shin, on the ankles and over the foot and toes.   8. You will need to wear a cast for 1-2 additional weeks after your wound has healed or until your skin has returned to normal thickness.  9. If the Clinic is closed proceed to the nearest emergency room or urgent care  This is a non-traditional cast and must be removed in a different manner.   10. Call clinic with any problems with your cast. Our phone number is [phone]. After hours please phone your PCP or go to the nearest ER.          Today's Provider: Colette Fisher NP March 4, 2024     Call us at [phone] if you have any questions about your wounds, have redness or swelling around your wound, have a fever of 101 degrees Fahrenheit or greater or if you have any other problems or concerns. We answer the phone Monday through Friday 8 am to 4 pm, please leave a message as we check the voicemail frequently throughout the day.     If you had a positive experience please indicate that on your patient satisfaction survey form that [facility] will be sending you.    It was a pleasure meeting with you today.  Thank you for allowing me and my team the privilege of caring for you today.  YOU are the reason we are here, and I truly hope we provided you with the excellent service you deserve.  Please let us know if there is anything else we can do for you so that we can be sure you are leaving completely satisfied with your care experience.      If you have any billing related questions please call the [facility] Business office at " [phone]. The clinic staff does not handle billing related matters.    If you are scheduled to have a follow up appointment, you will receive a reminder call the day before your visit. On the appointment day please arrive 15 minutes prior to your appointment time. If you are unable to keep that appointment, please call the clinic to cancel or reschedule. If you are more than 10 minutes late or greater for your scheduled appointment time, the clinic policy is that you may be asked to reschedule.

## 2024-03-04 NOTE — TELEPHONE ENCOUNTER
"Patient left a vm after clinic hours on 3/1/24 and again on 3/3/24 stating that he \"blew out the heel of the cast\". He is asking to speak with someone about next steps asap on Monday 3/4/24  "

## 2024-03-05 ENCOUNTER — TELEPHONE (OUTPATIENT)
Dept: WOUND CARE | Facility: CLINIC | Age: 64
End: 2024-03-05
Payer: COMMERCIAL

## 2024-03-05 NOTE — TELEPHONE ENCOUNTER
Returned call to patient. He reports the cast roll wasn't applied completely over the toes and a little bit over his heel. After discussing with patient it was decided to keep an eye on how it looks and feels and to call [facility] if anything changes but otherwise he will be seen on 3/8 for his next cast change.

## 2024-03-05 NOTE — PROGRESS NOTES
"[facility]    ASSESSMENT:   Cast broke secondary to stepping in water on Friday-maceration  Diabetic ulcer of other part of left foot associated with type 2 diabetes mellitus, with fat layer exposed     PLAN/DISCUSSION:   Discussed if cast were to break or if cast gets wet to proceed to urgent care or ED. Foot is macerated with DTI of the heel related to pressure from where cast broke down. Discussed placing Mepilex over the area, recasted Pat today, and applied Drawtex. If he has ongoing maceration noted at his next visit later this week, will take a 1 week break from casting.   Wound care plan:  gent to wound and periwound, acticoat 7 as primary beneath Essity system.  Dressing will be performed by facility staff See bottom of note for detailed wound care and patient instructions Dressing will be performed by facility staff See bottom of note for detailed wound care and patient instructions  Dietary recommendations discussed, see AVS   Reapplied Essity cast today.     HISTORY OF PRESENT ILLNESS:   Patient was seen at wound center for L foot wound. Previously followed by Dr. Soto. Per her note: \"He now follows up for his left foot, following second metatarsal resection on 10/19. Had an MRI that confirmed osteomyelitis and was scheduled for surgery quickly afterwards. Still takes augmentin long term per Id for chronic osteomyelitis.\"     2/1: TCC EZ started weekly.      2/20: Rets to for cast change. TCC EZ cracked at ankle. Replaced with Essity cast system and ActiCoat 7 as primary dressing. Green drainage so started on cipro and cultured. Culture came back resistant to orals.  2/23: Rets to clinic. Wound improved and green drainage lessened. Brought in gentamicin ointment I prescribed per culture results, plan to apply today. No issues with Essity cast but he did complain it kept him less active.      2/29:Wound bed appears . Unable to do 3C patch given low platelets and DOAC. " Continue Gentamicin ointment and Essity cast.    Patient Active Problem List   Diagnosis    Calculus of kidney    Chronic left shoulder pain    Type 2 diabetes mellitus with peripheral neuropathy (H)    Hyperlipidemia LDL goal <70    Essential hypertension    Right bundle branch block    GILA (obstructive sleep apnea)    Diabetic ulcer of lower extremity (H)    PVD (peripheral vascular disease) (H24)    Scoliosis of thoracic spine, unspecified scoliosis type    Other sequelae following unspecified cerebrovascular disease    Cervical pain    Bilateral thoracic back pain    Osteomyelitis of great toe of right foot (H)    Acute deep vein thrombosis (DVT) of tibial vein of right lower extremity (H)    Abnormal CT of liver    Benign neoplasm of transverse colon    Duodenitis    Hemorrhoids    Polyp of colon    Acute osteomyelitis of right foot (H)    Essential tremor    Nonalcoholic steatohepatitis    Otalgia of left ear    Shoulder pain    Liver cirrhosis secondary to SNYDER (H)    Gastro-esophageal reflux disease with esophagitis    Chronic osteomyelitis of right foot (H)    Chronic foot ulcer with fat layer exposed, left (H)    Diabetic ulcer of left foot with fat layer exposed (H)    Class 2 severe obesity due to excess calories with serious comorbidity in adult (H)       MEDICATIONS:   Current Outpatient Medications   Medication    amLODIPine (NORVASC) 5 MG tablet    amoxicillin-clavulanate (AUGMENTIN) 875-125 MG tablet    atorvastatin (LIPITOR) 40 MG tablet    blood glucose (NO BRAND SPECIFIED) lancets standard    calcium carbonate (OS-NARA) 500 MG tablet    Cholecalciferol (VITAMIN D3) 25 MCG (1000 UT) CAPS    Co-Enzyme Q10 100 MG CAPS    Continuous Blood Gluc Sensor (FREESTYLE LUCERO 14 DAY SENSOR) MISC    ELIQUIS ANTICOAGULANT 5 MG tablet    ferrous sulfate (FEROSUL) 325 (65 Fe) MG tablet    gabapentin (NEURONTIN) 100 MG capsule    gentamicin (GARAMYCIN) 0.1 % external ointment    hydrochlorothiazide (MICROZIDE) 12.5  MG capsule    Insulin Aspart FlexPen 100 UNIT/ML SOPN    insulin degludec (TRESIBA FLEXTOUCH) 100 UNIT/ML pen    insulin pen needle (B-D U/F) 31G X 5 MM miscellaneous    ketoconazole (NIZORAL) 2 % external shampoo    lisinopril (ZESTRIL) 40 MG tablet    MAGNESIUM GLYCINATE PLUS PO    metFORMIN (GLUCOPHAGE XR) 500 MG 24 hr tablet    Multiple Vitamins-Minerals (MULTIVITAMIN PO)    mupirocin (BACTROBAN) 2 % external ointment    Omega-3 Fatty Acids (OMEGA-3 FISH OIL PO)    omeprazole (PRILOSEC) 40 MG DR capsule    propranolol (INDERAL) 20 MG tablet    Semaglutide, 2 MG/DOSE, (OZEMPIC) 8 MG/3ML pen    tadalafil (CIALIS) 5 MG tablet     No current facility-administered medications for this encounter.       VITALS: Temp 98.1  F (36.7  C) (Temporal)      PHYSICAL EXAM:  GENERAL: Patient is alert and oriented and in no acute distress  INTEGUMENTARY:   Wound (used by OP [facility] only) 09/25/23 1505 Left plantar foot surgical (Active)   Thickness/Stage full thickness 03/04/24 1300   Base pink 03/04/24 1300   Periwound intact;macerated 03/04/24 1300   Periwound Temperature warm 03/04/24 1300   Periwound Skin Turgor soft 03/04/24 1300   Edges callused 03/04/24 1300   Length (cm) 1.5 03/04/24 1300   Width (cm) 1.5 03/04/24 1300   Depth (cm) 0.2 03/04/24 1300   Wound (cm^2) 2.25 cm^2 03/04/24 1300   Wound Volume (cm^3) 0.45 cm^3 03/04/24 1300   Wound healing % -294.74 03/04/24 1300   Drainage Characteristics/Odor green;serosanguineous 03/04/24 1300   Drainage Amount moderate 03/04/24 1300   Care, Wound non-select wound debridement performed. 03/04/24 1300           PROCEDURES: Mechanical debridement was performed with cleansing of the wound by the nursing staff      PATIENT INSTRUCTIONS      Further instructions from your care team         Crispin Rojas      1960    A DME order was not completed because the patient is having dressing changes done at [facility]. PLEASE BRING ANY SUPPLIES THAT WERE GIVEN TO YOU WITH YOU TO YOUR NEXT  "VISIT.     PLAN:  Plan for possible Epifix in the future  Continue to wear cast protector in shower  Continue to minimize walking/weight bearing  Keep blood sugars below 150 and A1C below 7  Whenever you sit, raise your ankle above your hips to promote wound healing  Reach out to [facility] to get new orthotics (order sent 09/2023): # [phone]        Wound Dressing Change: Left Plantar Foot  - Prophase protocol  - Cleanse with mild unscented soap (such as Cetaphil, Cerave or Dove) and water  - Apply small amount of 0.25% ACETIC ACID on gauze, lay into wound bed, let sit for 10 minutes, remove gauze (do not rinse)   - Apply Triad paste to periwound skin; Apply Atractain to intact skin otherwise  - Apply 2 layers Ioplex  - Apply Spandage size 7  - Apply 3 x 4 inch strips of Drawtex roll (3/4 added Exudry as well)  - Secure with roll gauze & tape  - Apply TCC-Essity  Change weekly at [facility]    Protein:  A diet high in protein is important for wound healing, we recommend getting up to 90 grams of protein per day.  Taking protein shakes or bars are a good way to get extra protein in your diet.     TCC-Essity:   The TCC-Essity Total Contact Cast that has been placed on your foot and leg by your doctor has been proven to be an effective method of off-loading your foot. It is important that you understand potential problems with the cast so they can be avoided. Please follow the instructions below during your care.  1. Limit activity for first 24 hours after cast is applied  2. The outer boot MUST always be worn when walking.  3. Do not insert anything under the cast. Injury to your skin can be very dangerous.  4. Subsequent cast changes will take place from 1-2 times per week as instructed.  5. . If the cast is \"loose\" or \"rubbing\" or causing pain, please call us- it may need to be changed. Or if you develop a fever, chills, nausea, or vomiting the cast must be removed to check the wound  6. Keep your cast dry. If the cast " gets wet, it must be changed immediately. (Use a protective cast bag when you are bathing.)  7. If the cast is removed in the hospital or ER the cast technician must be notified there is only padding on the front over the shin, on the ankles and over the foot and toes.   8. You will need to wear a cast for 1-2 additional weeks after your wound has healed or until your skin has returned to normal thickness.  9. If the Clinic is closed proceed to the nearest emergency room or urgent care  This is a non-traditional cast and must be removed in a different manner.   10. Call clinic with any problems with your cast. Our phone number is [phone]. After hours please phone your PCP or go to the nearest ER.          Today's Provider: Colette Fisher NP March 4, 2024     Call us at [phone] if you have any questions about your wounds, have redness or swelling around your wound, have a fever of 101 degrees Fahrenheit or greater or if you have any other problems or concerns. We answer the phone Monday through Friday 8 am to 4 pm, please leave a message as we check the voicemail frequently throughout the day.     If you had a positive experience please indicate that on your patient satisfaction survey form that [facility] will be sending you.    It was a pleasure meeting with you today.  Thank you for allowing me and my team the privilege of caring for you today.  YOU are the reason we are here, and I truly hope we provided you with the excellent service you deserve.  Please let us know if there is anything else we can do for you so that we can be sure you are leaving completely satisfied with your care experience.      If you have any billing related questions please call the [facility] Business office at [phone]. The clinic staff does not handle billing related matters.    If you are scheduled to have a follow up appointment, you will receive a reminder call the day before your visit. On the appointment day  please arrive 15 minutes prior to your appointment time. If you are unable to keep that appointment, please call the clinic to cancel or reschedule. If you are more than 10 minutes late or greater for your scheduled appointment time, the clinic policy is that you may be asked to reschedule.         Electronically signed by Colette Fisher CNP on March 5, 2024

## 2024-03-08 ENCOUNTER — HOSPITAL ENCOUNTER (OUTPATIENT)
Dept: WOUND CARE | Facility: CLINIC | Age: 64
Discharge: HOME | End: 2024-03-08
Attending: PHYSICIAN ASSISTANT | Admitting: PHYSICIAN ASSISTANT
Payer: COMMERCIAL

## 2024-03-08 VITALS — DIASTOLIC BLOOD PRESSURE: 83 MMHG | SYSTOLIC BLOOD PRESSURE: 175 MMHG | HEART RATE: 43 BPM | TEMPERATURE: 98.3 F

## 2024-03-08 DIAGNOSIS — E11.621 DIABETIC ULCER OF OTHER PART OF LEFT FOOT ASSOCIATED WITH TYPE 2 DIABETES MELLITUS, WITH FAT LAYER EXPOSED (H): Primary | ICD-10-CM

## 2024-03-08 DIAGNOSIS — L97.522 DIABETIC ULCER OF OTHER PART OF LEFT FOOT ASSOCIATED WITH TYPE 2 DIABETES MELLITUS, WITH FAT LAYER EXPOSED (H): Primary | ICD-10-CM

## 2024-03-08 PROCEDURE — 29445 APPL RIGID TOT CNTC LEG CAST: CPT | Performed by: PHYSICIAN ASSISTANT

## 2024-03-08 PROCEDURE — 97602 WOUND(S) CARE NON-SELECTIVE: CPT

## 2024-03-08 PROCEDURE — 15275 SKIN SUB GRAFT FACE/NK/HF/G: CPT | Performed by: PHYSICIAN ASSISTANT

## 2024-03-08 NOTE — DISCHARGE INSTRUCTIONS
Crispin Rojas      1960      A DME order was not completed because the patient is having dressing changes done at [facility]. PLEASE BRING ANY SUPPLIES THAT WERE GIVEN TO YOU WITH YOU TO YOUR NEXT VISIT.     PLAN:  Epifix 14mm applied today 3-8-24 to wound; Next application in one week on 3-14-24 with Colette Fisher  Continue to wear cast protector in shower  Continue to minimize walking/weight bearing  Keep blood sugars below 150 and A1C below 7  Whenever you sit, raise your ankle above your hips to promote wound healing  Reach out to [facility] to get new orthotics (order sent 09/2023): # [phone]        Wound Dressing Change: Left Plantar Foot  - Prophase protocol  - Cleanse with mild unscented soap (such as Cetaphil, Cerave or Dove) and water  - Apply small amount of 0.25% ACETIC ACID on gauze, lay into wound bed, let sit for 90 seconds, remove gauze (do not rinse)   - Apply Triad paste to periwound skin; Apply Atractain to intact skin otherwise  - Epifix 14mm applied to wound followed by Mepitel and Sterile Strips  - Cover with Melgisorb   - Cover with Drawtex pad  - Apply Spandage size 7  - Apply Exudry and secure with TCC-Essity 1st layer  - Apply TCC-Essity  Change weekly at [facility]     Protein:  A diet high in protein is important for wound healing, we recommend getting up to 90 grams of protein per day.  Taking protein shakes or bars are a good way to get extra protein in your diet.     TCC-Essity:   The TCC-Essity Total Contact Cast that has been placed on your foot and leg by your doctor has been proven to be an effective method of off-loading your foot. It is important that you understand potential problems with the cast so they can be avoided. Please follow the instructions below during your care.  1. Limit activity for first 24 hours after cast is applied  2. The outer boot MUST always be worn when walking.  3. Do not insert anything under the cast. Injury to your skin can be very dangerous.  4.  "Subsequent cast changes will take place from 1-2 times per week as instructed.  5. . If the cast is \"loose\" or \"rubbing\" or causing pain, please call us- it may need to be changed. Or if you develop a fever, chills, nausea, or vomiting the cast must be removed to check the wound  6. Keep your cast dry. If the cast gets wet, it must be changed immediately. (Use a protective cast bag when you are bathing.)  7. If the cast is removed in the hospital or ER the cast technician must be notified there is only padding on the front over the shin, on the ankles and over the foot and toes.   8. You will need to wear a cast for 1-2 additional weeks after your wound has healed or until your skin has returned to normal thickness.  9. If the Clinic is closed proceed to the nearest emergency room or urgent care  This is a non-traditional cast and must be removed in a different manner.   10. Call clinic with any problems with your cast. Our phone number is [phone]. After hours please phone your PCP or go to the nearest ER.        Main Provider: Alyssa Rosenthal PA-C March 8, 2024    Call us at [phone] if you have any questions about your wounds, have redness or swelling around your wound, have a fever of 101 degrees Fahrenheit or greater or if you have any other problems or concerns. We answer the phone Monday through Friday 8 am to 4 pm, please leave a message as we check the voicemail frequently throughout the day.     If you had a positive experience please indicate that on your patient satisfaction survey form that [facility] will be sending you.    It was a pleasure meeting with you today.  Thank you for allowing me and my team the privilege of caring for you today.  YOU are the reason we are here, and I truly hope we provided you with the excellent service you deserve.  Please let us know if there is anything else we can do for you so that we can be sure you are leaving completely satisfied with your care " experience.      If you have any billing related questions please call the [facility] Business office at [phone]. The clinic staff does not handle billing related matters.    If you are scheduled to have a follow up appointment, you will receive a reminder call the day before your visit. On the appointment day please arrive 15 minutes prior to your appointment time. If you are unable to keep that appointment, please call the clinic to cancel or reschedule. If you are more than 10 minutes late or greater for your scheduled appointment time, the clinic policy is that you may be asked to reschedule.

## 2024-03-09 DIAGNOSIS — E11.42 TYPE 2 DIABETES MELLITUS WITH PERIPHERAL NEUROPATHY (H): ICD-10-CM

## 2024-03-11 RX ORDER — INSULIN ASPART 100 [IU]/ML
INJECTION, SOLUTION INTRAVENOUS; SUBCUTANEOUS
Qty: 30 ML | Refills: 1 | Status: SHIPPED | OUTPATIENT
Start: 2024-03-11 | End: 2024-05-17

## 2024-03-11 NOTE — TELEPHONE ENCOUNTER
Requested Prescriptions   Pending Prescriptions Disp Refills    Insulin Aspart FlexPen 100 UNIT/ML SOPN [Pharmacy Med Name: INSULIN ASPART FLEXPEN INJ, 3ML] 30 mL 0     Sig: ADMINISTER 70 UNITS UNDER THE SKIN DAILY       Insulin Protocol Failed - 3/9/2024  4:25 PM        Failed - Chart Review Required     Review Chart.    Do not approve if insulin is used in a pump.  Instead, direct refill request to the patient's endocrinologist.  If the patient doesn't have an endocrinologist, then send the refill to the patient's PCP for review            Passed - Medication is active on med list        Passed - Has GFR on file in past 12 months and most recent value is normal        Passed - Recent (6 mo) or future (90 days) visit within the authorizing provider's specialty     The patient must have completed an in-person or virtual visit within the past 6 months or has a future visit scheduled within the next 90 days with the authorizing provider s specialty.  Urgent care and e-visits do not quality as an office visit for this protocol.          Passed - Medication indicated for associated diagnosis     Medication is associated with one or more of the following diagnoses:   - Type 1 diabetes mellitus  - Type 2 diabetes mellitus  - Diabetic nephropathy; Prophylaxis  - Neuropathy due to diabetes mellitus; Prophylaxis  - Retinopathy due to diabetes mellitus; Prophylaxis  - Diabetes mellitus associated with cystic fibrosis  - Disorder of cardiovascular system; Prophylaxis - Type 1 diabetes mellitus   - Disorder of cardiovascular system; Prophylaxis - Type 2 diabetes mellitus            Passed - Patient is 18 years of age or older       Short Acting Insulin Protocol Passed - 3/9/2024  4:25 PM        Passed - Serum creatinine on file in past 12 months     Recent Labs   Lab Test 01/31/24  1423 01/27/16  1518 01/25/16  0704   CR 1.13   < >  --    CREAT  --   --  0.8    < > = values in this interval not displayed.       Ok to  "refill medication if creatinine is low          Passed - HgbA1C in past 3 or 6 months     If HgbA1C is 8 or greater, it needs to be on file within the past 3 months.  If less than 8, must be on file within the past 6 months.     Recent Labs   Lab Test 01/29/24  1032   A1C 7.9*             Passed - Medication is active on med list        Passed - Patient is age 18 or older        Passed - Recent (6 mo) or future (30 days) visit within the authorizing provider's specialty     Patient had office visit in the last 6 months or has a visit in the next 30 days with authorizing provider or within the authorizing provider's specialty.  See \"Patient Info\" tab in inbasket, or \"Choose Columns\" in Meds & Orders section of the refill encounter.                 "

## 2024-03-12 ENCOUNTER — OFFICE VISIT (OUTPATIENT)
Dept: FAMILY MEDICINE | Facility: CLINIC | Age: 64
End: 2024-03-12
Payer: COMMERCIAL

## 2024-03-12 VITALS
DIASTOLIC BLOOD PRESSURE: 73 MMHG | SYSTOLIC BLOOD PRESSURE: 161 MMHG | OXYGEN SATURATION: 96 % | BODY MASS INDEX: 36.68 KG/M2 | HEART RATE: 53 BPM | RESPIRATION RATE: 20 BRPM | HEIGHT: 71 IN | TEMPERATURE: 97.6 F | WEIGHT: 262 LBS

## 2024-03-12 DIAGNOSIS — E11.621 TYPE 2 DIABETES MELLITUS WITH FOOT ULCER, WITH LONG-TERM CURRENT USE OF INSULIN (H): ICD-10-CM

## 2024-03-12 DIAGNOSIS — I82.542 CHRONIC DEEP VEIN THROMBOSIS (DVT) OF TIBIAL VEIN OF LEFT LOWER EXTREMITY (H): ICD-10-CM

## 2024-03-12 DIAGNOSIS — Z01.818 PREOP GENERAL PHYSICAL EXAM: Primary | ICD-10-CM

## 2024-03-12 DIAGNOSIS — H25.9 SENILE CATARACT OF LEFT EYE, UNSPECIFIED AGE-RELATED CATARACT TYPE: ICD-10-CM

## 2024-03-12 DIAGNOSIS — Z79.4 TYPE 2 DIABETES MELLITUS WITH FOOT ULCER, WITH LONG-TERM CURRENT USE OF INSULIN (H): ICD-10-CM

## 2024-03-12 DIAGNOSIS — I10 ESSENTIAL HYPERTENSION: ICD-10-CM

## 2024-03-12 DIAGNOSIS — L97.509 TYPE 2 DIABETES MELLITUS WITH FOOT ULCER, WITH LONG-TERM CURRENT USE OF INSULIN (H): ICD-10-CM

## 2024-03-12 PROCEDURE — 99213 OFFICE O/P EST LOW 20 MIN: CPT | Performed by: GENERAL PRACTICE

## 2024-03-12 ASSESSMENT — PAIN SCALES - GENERAL: PAINLEVEL: NO PAIN (0)

## 2024-03-12 NOTE — PROGRESS NOTES
Preoperative Evaluation  [facility]  [address] [facility] [address]  Phone: [phone]  Primary Provider: Johann Senra  Pre-op Performing Provider: SHARMILA FIGUEROA  Mar 12, 2024       Pat is a 63 year old, presenting for the following:  Pre-Op Exam        3/12/2024     1:43 PM   Additional Questions   Roomed by Lisa Magill, CMA   Accompanied by self         3/12/2024     1:43 PM   Patient Reported Additional Medications   Patient reports taking the following new medications NONE     Surgical Information  Surgery/Procedure: cataract surgery   Surgery Location: [facility] IN [place]  Surgeon: ISRAEL KELLY  Surgery Date: TO BE DETERMINED   Time of Surgery: TBD  Where patient plans to recover: At home with family  Fax number for surgical facility: [phone]    Assessment & Plan     The proposed surgical procedure is considered LOW risk.    Preop general physical exam      Senile cataract of left eye, unspecified age-related cataract type  Surgery as planned    Type 2 diabetes mellitus with foot ulcer, with long-term current use of insulin (H)  Recent A1C 7.9    Essential hypertension  Elevated BP in the office  Check Home BP 2x daily for 7 days and reach out if elevated    Chronic deep vein thrombosis (DVT) of tibial vein of left lower extremity (H)  Chronic DVT  On Eliquis      Preop is good until 4/17/24.      - No identified additional risk factors other than previously addressed    Antiplatelet or Anticoagulation Medication Instructions   - Bleeding risk is low for this procedure (e.g. dental, skin, cataract).   - apixaban (Eliquis), edoxaban (Savaysa), rivaroxaban (Xarelto): Bleeding risk is low for this procedure AND CrCl (>=) 50 mL/min. HOLD 1 day before surgery.      Additional Medication Instructions  Patient is to take all scheduled medications on the day of surgery EXCEPT for modifications listed below:   - metformin: HOLD day of surgery.   - GLP-1  Injectable (exenitide, liraglutide, semaglutide, dulaglutide, etc.): HOLD 7 days before surgery     Recommendation  APPROVAL GIVEN to proceed with proposed procedure, without further diagnostic evaluation.          Subjective     1. yes - Have you ever had a heart attack or stroke?  2. No - Have you ever had surgery on your heart or blood vessels, such as a stent, coronary (heart) bypass, or surgery on an artery in the head, neck, heart, or legs?  3. No - Do you have chest pain when you are physically active?  4. No - Do you have a history of heart failure?  5. No - Do you currently have a cold, bronchitis, or symptoms of other respiratory (head and chest) infections?  6. No - Do you have a cough, shortness of breath, or wheezing?  7. No - Do you or anyone in your family have a history of blood clots?  8. No - Do you or anyone in your family have a serious bleeding problem, such as long-lasting bleeding after surgeries or cuts?  9. yes - Have you ever had anemia or been told to take iron pills?  10. No - Have you had any abnormal blood loss such as black, tarry or bloody stools, or abnormal vaginal bleeding?  11. No - Have you ever had a blood transfusion?  12. Yes - Are you willing to have a blood transfusion if it is medically needed before, during, or after your surgery?  13. yes - Have you or anyone in your family ever had problems with anesthesia (sedation for surgery)? mom  14. yes - Do you have sleep apnea, excessive snoring, or daytime drowsiness?   15. No - Do you have any artifical heart valves or other implanted medical devices, such as a pacemaker, defibrillator, or continuous glucose monitor?  16. No - Do you have any artifical joints?  17. No - Are you allergic to latex?  18. No - Is there any chance that you may be pregnant?     HPI related to upcoming procedure: No concerns today     Health Care Directive  Patient does not have a Health Care Directive or Living Will: Patient states has Advance  Directive and will bring in a copy to clinic.    Preoperative Review of         Patient Active Problem List    Diagnosis Date Noted    Class 2 severe obesity due to excess calories with serious comorbidity in adult (H) 02/16/2024     Priority: Medium    Diabetic ulcer of left foot with fat layer exposed (H) 10/30/2023     Priority: Medium    Chronic foot ulcer with fat layer exposed, left (H) 09/25/2023     Priority: Medium    Liver cirrhosis secondary to SNYDER (H) 01/26/2023     Priority: Medium    Gastro-esophageal reflux disease with esophagitis 12/26/2022     Priority: Medium    Nonalcoholic steatohepatitis 11/30/2022     Priority: Medium    Essential tremor 10/03/2022     Priority: Medium    Chronic osteomyelitis of right foot (H) 08/18/2022     Priority: Medium    Acute osteomyelitis of right foot (H) 07/22/2022     Priority: Medium    Abnormal CT of liver 07/08/2022     Priority: Medium    Acute deep vein thrombosis (DVT) of tibial vein of right lower extremity (H) 07/06/2022     Priority: Medium    Osteomyelitis of great toe of right foot (H) 06/03/2022     Priority: Medium    Cervical pain 01/13/2022     Priority: Medium    Bilateral thoracic back pain 01/13/2022     Priority: Medium    Scoliosis of thoracic spine, unspecified scoliosis type 01/03/2022     Priority: Medium    PVD (peripheral vascular disease) (H24) 12/31/2021     Priority: Medium     s/p left partial toe amputation      Other sequelae following unspecified cerebrovascular disease 12/02/2021     Priority: Medium    Benign neoplasm of transverse colon 11/19/2021     Priority: Medium    Hemorrhoids 11/17/2021     Priority: Medium    Polyp of colon 11/17/2021     Priority: Medium    Duodenitis 10/15/2021     Priority: Medium    Diabetic ulcer of lower extremity (H) 11/07/2019     Priority: Medium    GILA (obstructive sleep apnea) 10/22/2018     Priority: Medium     HST 10/18/2018, AHI : 34.1      Right bundle branch block 06/23/2018      Priority: Medium     On ECG 6/23/2018, advised to f/u with PCP      Hyperlipidemia LDL goal <70 06/07/2018     Priority: Medium    Essential hypertension 06/07/2018     Priority: Medium    Otalgia of left ear 12/11/2017     Priority: Medium    Type 2 diabetes mellitus with peripheral neuropathy (H) 11/27/2017     Priority: Medium    Chronic left shoulder pain 01/30/2017     Priority: Medium    Calculus of kidney 08/30/2016     Priority: Medium    Shoulder pain 01/13/2015     Priority: Medium      Past Medical History:   Diagnosis Date    Antiplatelet or antithrombotic long-term use     Ascending aorta dilatation (H24)     Cerebral artery occlusion with cerebral infarction (H)     Cerebral infarction (H)     2010    Gastroesophageal reflux disease with esophagitis     H/O blood clots 2022    Hyperlipidemia LDL goal <70     Hypertension     Nonalcoholic steatohepatitis 11/30/2022    Numbness and tingling     Obesity     GILA (obstructive sleep apnea) 10/22/2018    CPAP intolerant    PVD (peripheral vascular disease) (H24)     s/p left partial toe amputation    Renal stone     Tremor     Type 2 diabetes mellitus with diabetic polyneuropathy, with long-term current use of insulin (H)      Past Surgical History:   Procedure Laterality Date    AMPUTATE FOOT Left 8/18/2016    Procedure: AMPUTATE FOOT;  Surgeon: Jack Younger DPM;  Location: [facility]    AMPUTATE TOE(S) Right 2/1/2016    Procedure: AMPUTATE TOE(S);  Surgeon: Rachelle Manriquez DPM, Pod;  Location: [facility]    AMPUTATE TOE(S) Right 8/2/2019    Procedure: Right fourth toe partial amputation for treatment of osteomyelitis.;  Surgeon: Jack Younger DPM;  Location: [facility]    AMPUTATE TOE(S) Left 11/8/2019    Procedure: Left second toe amputation at the metatarsophalangeal joint.;  Surgeon: Rachelle Manriquez DPM, Podiatry/Foot and Ankle Surgery;  Location: [facility]    AMPUTATE TOE(S) Right 6/5/2022    Procedure: AMPUTATION OF RIGHT GREAT TOE;  Surgeon: Etta  SHRUTHI Rasheed;  Location: [facility]    AMPUTATE TOE(S) Right 7/28/2022    Procedure: Right foot partial first metatarsal resection;  Surgeon: Tiny Soto DPM;  Location: [facility]    ANGIOGRAM      BIOPSY BONE FOOT Right 7/24/2022    Procedure: Bone biopsy, right first metatarsal.;  Surgeon: Valentino Molina DPM;  Location: [facility]    COLONOSCOPY      COMBINED CYSTOSCOPY, RETROGRADES, URETEROSCOPY, INSERT STENT Left 8/21/2016    Procedure: COMBINED CYSTOSCOPY, RETROGRADES, URETEROSCOPY, INSERT STENT;  Surgeon: Artemio Valenzuela MD;  Location: [facility]    COMBINED CYSTOSCOPY, RETROGRADES, URETEROSCOPY, LASER HOLMIUM LITHOTRIPSY URETER(S), INSERT STENT Left 10/3/2016    Procedure: COMBINED CYSTOSCOPY, RETROGRADES, URETEROSCOPY, LASER HOLMIUM LITHOTRIPSY URETER(S), INSERT STENT;  Surgeon: Artemio Valenzuela MD;  Location: [facility]    EXTRACORPOREAL SHOCK WAVE LITHOTRIPSY (ESWL) Left 9/8/2016    Procedure: EXTRACORPOREAL SHOCK WAVE LITHOTRIPSY (ESWL);  Surgeon: Artemio Valenzuela MD;  Location: [facility]    INCISION AND DRAINAGE FOOT, COMBINED Right 7/24/2022    Procedure: Incision and drainage, right foot ;  Surgeon: Valentino Molina DPM;  Location: [facility]    IRRIGATION AND DEBRIDEMENT FOOT, COMBINED Left 10/19/2023    Procedure: Left foot second metatarsal resection , . Left plantar ulcer excisional debridement, down to and including tendon, with closure, site measuring 4 cm x 2 cm x 1 cm;  Surgeon: Tiny Soto DPM;  Location: [facility]    OSTEOTOMY FOOT Right 11/30/2022    Procedure: 1. Right second metatarsal floating osteotomy  2. Right second digit proximal phalanx arthroplasty 3. Right percutaneous flexor tenotomy;  Surgeon: Tiny Soto DPM;  Location: [facility]    OSTEOTOMY FOOT Right 1/19/2023    Procedure: Right foot third metatarsal head excision, ulcer debridement, matatarsal osteotomies;  Surgeon: Tiny Soto DPM;  Location: [facility]    RECESSION GASTROCNEMIUS Right 2/1/2016     Procedure: RECESSION GASTROCNEMIUS;  Surgeon: Rachelle Manriquez, DPM, Pod;  Location: [facility]     Current Outpatient Medications   Medication Sig Dispense Refill    amLODIPine (NORVASC) 5 MG tablet Take 1 tablet (5 mg) by mouth 2 times daily Take one tablet twice daily  SEE MD THIS QUARTER 200 tablet 5    atorvastatin (LIPITOR) 40 MG tablet Take 1 tablet (40 mg) by mouth daily Take one tablet daily SEE PROVIDER FOR NEXT REFILL 90 tablet 4    blood glucose (NO BRAND SPECIFIED) lancets standard Use to test blood sugar 3 times daily or as directed. 300 each 3    calcium carbonate (OS-NARA) 500 MG tablet Take 1 tablet by mouth 2 times daily      Cholecalciferol (VITAMIN D3) 25 MCG (1000 UT) CAPS Take 1,000 Units by mouth daily       Co-Enzyme Q10 100 MG CAPS Take 100 mg by mouth daily       Continuous Blood Gluc Sensor (FREESTYLE LUCERO 14 DAY SENSOR) MISC USE ONE EVERY 14 DAYS 6 each 3    ELIQUIS ANTICOAGULANT 5 MG tablet Take 1 tablet (5 mg) by mouth 2 times daily 180 tablet 4    ferrous sulfate (FEROSUL) 325 (65 Fe) MG tablet Take 325 mg by mouth daily (with breakfast)      gabapentin (NEURONTIN) 100 MG capsule Take 400 mg by mouth 3 times daily      gentamicin (GARAMYCIN) 0.1 % external ointment Apply 5g topically to wound with bandage changes. 30 g 2    hydrochlorothiazide (MICROZIDE) 12.5 MG capsule Take 1 capsule (12.5 mg) by mouth every morning 90 capsule 4    Insulin Aspart FlexPen 100 UNIT/ML SOPN ADMINISTER 70 UNITS UNDER THE SKIN DAILY 30 mL 1    insulin degludec (TRESIBA FLEXTOUCH) 100 UNIT/ML pen Administer 50 - 55 units under skin daily 45 mL 3    insulin pen needle (B-D U/F) 31G X 5 MM miscellaneous USE TO INJECT LANTUS TWICE DAILY AND NOVOLOG THREE TIMES DAILY AS DIRECTED 500 each 3    ketoconazole (NIZORAL) 2 % external shampoo Apply topically daily as needed for itching or irritation See MD after 6 weeks 120 mL 1    lisinopril (ZESTRIL) 40 MG tablet Take 1 tablet (40 mg) by mouth daily 90 tablet 4     "MAGNESIUM GLYCINATE PLUS PO Take 400 mg by mouth 2 times daily      metFORMIN (GLUCOPHAGE XR) 500 MG 24 hr tablet Take 2 tablets (1,000 mg) by mouth 2 times daily 360 tablet 0    Multiple Vitamins-Minerals (MULTIVITAMIN PO) Take 1 tablet by mouth daily       mupirocin (BACTROBAN) 2 % external ointment Apply a small amount to affected nares 2 times a day for one month. 30 g 1    Omega-3 Fatty Acids (OMEGA-3 FISH OIL PO) Take 1 g by mouth 2 times daily (with meals)       omeprazole (PRILOSEC) 40 MG DR capsule Take 40 mg by mouth daily      propranolol (INDERAL) 20 MG tablet Take 20 mg by mouth      Semaglutide, 2 MG/DOSE, (OZEMPIC) 8 MG/3ML pen Inject 2 mg Subcutaneous every 7 days 9 mL 3    tadalafil (CIALIS) 5 MG tablet TAKE 1 TABLET BY MOUTH EVERY DAY AS NEEDED one hour before intercourse 30 tablet 1    amoxicillin-clavulanate (AUGMENTIN) 875-125 MG tablet Take 1 tablet by mouth daily at 2 pm (Patient not taking: Reported on 2/29/2024)         Allergies   Allergen Reactions    Statins Swelling     Other reaction(s): Other (see comments)  SIMCOR caused edema in extremities      Bactrim [Sulfamethoxazole-Trimethoprim] Nausea and Vomiting    Sulfa Antibiotics Nausea    Niacin Swelling     SIMCOR caused edema in extremities    Simvastatin Swelling     SIMCOR caused edema in extremities        Social History     Tobacco Use    Smoking status: Never    Smokeless tobacco: Never   Substance Use Topics    Alcohol use: Yes     Comment: rare---red wine 3x per month       History   Drug Use Unknown         Review of Systems    Review of Systems  Constitutional, HEENT, cardiovascular, pulmonary, GI, , musculoskeletal, neuro, skin, endocrine and psych systems are negative, except as otherwise noted.    Objective    BP (!) 161/73 (BP Location: Right arm, Patient Position: Sitting, Cuff Size: Adult Large)   Pulse 53   Temp 97.6  F (36.4  C) (Oral)   Resp 20   Ht 1.803 m (5' 11\")   Wt 118.8 kg (262 lb)   SpO2 96%   BMI " "36.54 kg/m     Estimated body mass index is 36.54 kg/m  as calculated from the following:    Height as of this encounter: 1.803 m (5' 11\").    Weight as of this encounter: 118.8 kg (262 lb).  Physical Exam  Constitutional:       Appearance: Normal appearance.   HENT:      Head: Normocephalic and atraumatic.      Right Ear: Tympanic membrane, ear canal and external ear normal.      Left Ear: Tympanic membrane, ear canal and external ear normal.      Nose: Nose normal.      Mouth/Throat:      Mouth: Mucous membranes are moist.      Pharynx: Oropharynx is clear.   Eyes:      Extraocular Movements: Extraocular movements intact.      Conjunctiva/sclera: Conjunctivae normal.      Pupils: Pupils are equal, round, and reactive to light.   Cardiovascular:      Rate and Rhythm: Normal rate and regular rhythm.      Pulses: Normal pulses.      Heart sounds: Normal heart sounds.   Pulmonary:      Effort: Pulmonary effort is normal.      Breath sounds: Normal breath sounds.   Abdominal:      General: Abdomen is flat. Bowel sounds are normal.      Palpations: Abdomen is soft.   Musculoskeletal:         General: Normal range of motion.      Cervical back: Normal range of motion and neck supple.   Feet:      Comments: Right foot cast  Skin:     General: Skin is warm.      Capillary Refill: Capillary refill takes less than 2 seconds.   Neurological:      General: No focal deficit present.      Mental Status: He is alert and oriented to person, place, and time. Mental status is at baseline.   Psychiatric:         Mood and Affect: Mood and affect normal.         Speech: Speech normal.         Behavior: Behavior normal. Behavior is cooperative.         Thought Content: Thought content normal.         Cognition and Memory: Cognition normal.         Judgment: Judgment normal.           Recent Labs   Lab Test 01/31/24  1423 01/29/24  1032 10/18/23  1219 07/10/23  0814   HGB 11.9*  --  11.5* 11.7*     --   --  152    140  --  " 140   POTASSIUM 4.4 4.5  --  4.6   CR 1.13 1.14  --  0.88   A1C  --  7.9*  --  6.0*        Diagnostics  No labs were ordered during this visit.   No EKG required for low risk surgery (cataract, skin procedure, breast biopsy, etc).    Revised Cardiac Risk Index (RCRI)  The patient has the following serious cardiovascular risks for perioperative complications:   - No serious cardiac risks = 0 points     RCRI Interpretation: 0 points: Class I (very low risk - 0.4% complication rate)         Signed Electronically by: Leeann Baker MD  Copy of this evaluation report is provided to requesting physician.

## 2024-03-12 NOTE — PATIENT INSTRUCTIONS
Preparing for Your Surgery  Getting started  A nurse will call you to review your health history and instructions. They will give you an arrival time based on your scheduled surgery time. Please be ready to share:  Your doctor's clinic name and phone number  Your medical, surgical, and anesthesia history  A list of allergies and sensitivities  A list of medicines, including herbal treatments and over-the-counter drugs  Whether the patient has a legal guardian (ask how to send us the papers in advance)  Please tell us if you're pregnant--or if there's any chance you might be pregnant. Some surgeries may injure a fetus (unborn baby), so they require a pregnancy test. Surgeries that are safe for a fetus don't always need a test, and you can choose whether to have one.   If you have a child who's having surgery, please ask for a copy of Preparing for Your Child's Surgery.    Preparing for surgery  Within 10 to 30 days of surgery: Have a pre-op exam (sometimes called an H&P, or History and Physical). This can be done at a clinic or pre-operative center.  If you're having a , you may not need this exam. Talk to your care team.  At your pre-op exam, talk to your care team about all medicines you take. If you need to stop any medicines before surgery, ask when to start taking them again.  We do this for your safety. Many medicines can make you bleed too much during surgery. Some change how well surgery (anesthesia) drugs work.  Call your insurance company to let them know you're having surgery. (If you don't have insurance, call [phone].)  Call your clinic if there's any change in your health. This includes signs of a cold or flu (sore throat, runny nose, cough, rash, fever). It also includes a scrape or scratch near the surgery site.  If you have questions on the day of surgery, call your hospital or surgery center.  Eating and drinking guidelines  For your safety: Unless your surgeon tells you otherwise,  follow the guidelines below.  Eat and drink as usual until 8 hours before you arrive for surgery. After that, no food or milk.  Drink clear liquids until 2 hours before you arrive. These are liquids you can see through, like water, Gatorade, and Propel Water. They also include plain black coffee and tea (no cream or milk), candy, and breath mints. You can spit out gum when you arrive.  If you drink alcohol: Stop drinking it the night before surgery.  If your care team tells you to take medicine on the morning of surgery, it's okay to take it with a sip of water.  Preventing infection  Shower or bathe the night before and morning of your surgery. Follow the instructions your clinic gave you. (If no instructions, use regular soap.)  Don't shave or clip hair near your surgery site. We'll remove the hair if needed.  Don't smoke or vape the morning of surgery. You may chew nicotine gum up to 2 hours before surgery. A nicotine patch is okay.  Note: Some surgeries require you to completely quit smoking and nicotine. Check with your surgeon.  Your care team will make every effort to keep you safe from infection. We will:  Clean our hands often with soap and water (or an alcohol-based hand rub).  Clean the skin at your surgery site with a special soap that kills germs.  Give you a special gown to keep you warm. (Cold raises the risk of infection.)  Wear special hair covers, masks, gowns and gloves during surgery.  Give antibiotic medicine, if prescribed. Not all surgeries need antibiotics.  What to bring on the day of surgery  Photo ID and insurance card  Copy of your health care directive, if you have one  Glasses and hearing aids (bring cases)  You can't wear contacts during surgery  Inhaler and eye drops, if you use them (tell us about these when you arrive)  CPAP machine or breathing device, if you use them  A few personal items, if spending the night  If you have . . .  A pacemaker, ICD (cardiac defibrillator) or other  implant: Bring the ID card.  An implanted stimulator: Bring the remote control.  A legal guardian: Bring a copy of the certified (court-stamped) guardianship papers.  Please remove any jewelry, including body piercings. Leave jewelry and other valuables at home.  If you're going home the day of surgery  You must have a responsible adult drive you home. They should stay with you overnight as well.  If you don't have someone to stay with you, and you aren't safe to go home alone, we may keep you overnight. Insurance often won't pay for this.  After surgery  If it's hard to control your pain or you need more pain medicine, please call your surgeon's office.  Questions?   If you have any questions for your care team, list them here: _________________________________________________________________________________________________________________________________________________________________________ ____________________________________ ____________________________________ ____________________________________  For informational purposes only. Not to replace the advice of your health care provider. Copyright   2003, 2019 [facility]. All rights reserved. Clinically reviewed by Jada Andrade MD. [employer] 182771 - REV 12/22.    How to Take Your Medication Before Surgery  - Take all of your medications before surgery except as noted below  - Ok to take amlodipine 10 mg once daily  -Take 80% of the morning dose of Tresiba. Hold Aspart morning of surgery. Hold metformin day of surgery. Hold Semaglutide 1 week prior to surgery.   -Hold Eliquis 1 day prior to surgery.   -Hold supplements/calcium/vitamin D/MTV/magnesium/Q10/Iron/Fish oil 14 days prior to surgery.

## 2024-03-12 NOTE — NURSING NOTE
"Chief Complaint   Patient presents with    Pre-Op Exam     Initial BP (!) 161/73 (BP Location: Right arm, Patient Position: Sitting, Cuff Size: Adult Large)   Pulse 53   Temp 97.6  F (36.4  C) (Oral)   Resp 20   Ht 1.803 m (5' 11\")   Wt 118.8 kg (262 lb)   SpO2 96%   BMI 36.54 kg/m   Estimated body mass index is 36.54 kg/m  as calculated from the following:    Height as of this encounter: 1.803 m (5' 11\").    Weight as of this encounter: 118.8 kg (262 lb).  BP completed using cuff size large right arm    Lisa Magill, CMA    "

## 2024-03-13 ENCOUNTER — TELEPHONE (OUTPATIENT)
Dept: FAMILY MEDICINE | Facility: CLINIC | Age: 64
End: 2024-03-13
Payer: COMMERCIAL

## 2024-03-13 NOTE — PROGRESS NOTES
"[facility]    ASSESSMENT:   Diabetic ulcer of other part of left foot associated with type 2 diabetes mellitus, with fat layer exposed     PLAN/DISCUSSION:   1st Epifix application today, 14mm disc [id number] exp 11/1/28  Acetic acid soak performed then flushed with saline. Epifix affixed with mepitel, steri strips, melgisorb, drawtex. Additional layer of 3\" cast wrap applied around foot due to hx of cracking his cast    INTERVAL Hx:  3/8: Rets to clinic for cast change. Minimal green drainage, wound granular. No maceration.     HISTORY OF PRESENT ILLNESS:   Patient was seen at wound center for L foot wound. Previously followed by Dr. Soto. Per her note: \"He now follows up for his left foot, following second metatarsal resection on 10/19. Had an MRI that confirmed osteomyelitis and was scheduled for surgery quickly afterwards. Still takes augmentin long term per Id for chronic osteomyelitis.\"     2/1: TCC EZ started weekly.      2/20: TCC EZ cracked at ankle. Changed to Essity cast system and ActiCoat 7 as primary dressing. Green drainage so started on cipro and cultured - resistant to orals.    2/23: Wound improved and green drainage lessened. Started topical gent on wound and periwound. No issues with Essity cast.    2/29: Wound bed appears . Unable to do 3C patch given low platelets and DOAC. Continue Gentamicin ointment and Essity cast.      3/4: Stepped in water 3/1 and cast broke. Foot macerated with DTI of heel. Drawtex added to regimen.    VITALS: BP (!) 175/83 (BP Location: Right arm, Patient Position: Sitting, Cuff Size: Adult Regular)   Pulse (!) 43   Temp 98.3  F (36.8  C) (Temporal)      PHYSICAL EXAM:  GENERAL: Patient is alert and oriented and in no acute distress  INTEGUMENTARY:   Wound (used by OP [facility] only) 09/25/23 1505 Left plantar foot surgical (Active)   Thickness/Stage full thickness 03/08/24 0931   Base pink 03/08/24 0931   Periwound intact;macerated " 03/08/24 0931   Periwound Temperature warm 03/08/24 0931   Periwound Skin Turgor soft 03/08/24 0931   Edges callused 03/08/24 0931   Length (cm) 1.5 03/08/24 0931   Width (cm) 1.4 03/08/24 0931   Depth (cm) 0.3 03/08/24 0931   Wound (cm^2) 2.1 cm^2 03/08/24 0931   Wound Volume (cm^3) 0.63 cm^3 03/08/24 0931   Wound healing % -268.42 03/08/24 0931   Drainage Characteristics/Odor green;serosanguineous 03/08/24 0931   Drainage Amount moderate 03/08/24 0931   Care, Wound non-select wound debridement performed. 03/08/24 0931           PROCEDURES: Mechanical debridement was performed with cleansing of the wound by the nursing staff. TCC system was reapplied after epifix application.      PATIENT INSTRUCTIONS      Further instructions from your care team         Crispin Rojas      1960      A DME order was not completed because the patient is having dressing changes done at [facility]. PLEASE BRING ANY SUPPLIES THAT WERE GIVEN TO YOU WITH YOU TO YOUR NEXT VISIT.     PLAN:  Epifix 14mm applied today 3-8-24 to wound; Next application in one week on 3-14-24 with Colette Fisher  Continue to wear cast protector in shower  Continue to minimize walking/weight bearing  Keep blood sugars below 150 and A1C below 7  Whenever you sit, raise your ankle above your hips to promote wound healing  Reach out to [facility] to get new orthotics (order sent 09/2023): # [phone]        Wound Dressing Change: Left Plantar Foot  - Prophase protocol  - Cleanse with mild unscented soap (such as Cetaphil, Cerave or Dove) and water  - Apply small amount of 0.25% ACETIC ACID on gauze, lay into wound bed, let sit for 90 seconds, remove gauze (do not rinse)   - Apply Triad paste to periwound skin; Apply Atractain to intact skin otherwise  - Epifix 14mm applied to wound followed by Mepitel and Sterile Strips  - Cover with Melgisorb   - Cover with Drawtex pad  - Apply Spandage size 7  - Apply Exudry and secure with TCC-Essity 1st layer  - Apply  "TCC-Essity  Change weekly at [facility]     Protein:  A diet high in protein is important for wound healing, we recommend getting up to 90 grams of protein per day.  Taking protein shakes or bars are a good way to get extra protein in your diet.     TCC-Essity:   The TCC-Essity Total Contact Cast that has been placed on your foot and leg by your doctor has been proven to be an effective method of off-loading your foot. It is important that you understand potential problems with the cast so they can be avoided. Please follow the instructions below during your care.  1. Limit activity for first 24 hours after cast is applied  2. The outer boot MUST always be worn when walking.  3. Do not insert anything under the cast. Injury to your skin can be very dangerous.  4. Subsequent cast changes will take place from 1-2 times per week as instructed.  5. . If the cast is \"loose\" or \"rubbing\" or causing pain, please call us- it may need to be changed. Or if you develop a fever, chills, nausea, or vomiting the cast must be removed to check the wound  6. Keep your cast dry. If the cast gets wet, it must be changed immediately. (Use a protective cast bag when you are bathing.)  7. If the cast is removed in the hospital or ER the cast technician must be notified there is only padding on the front over the shin, on the ankles and over the foot and toes.   8. You will need to wear a cast for 1-2 additional weeks after your wound has healed or until your skin has returned to normal thickness.  9. If the Clinic is closed proceed to the nearest emergency room or urgent care  This is a non-traditional cast and must be removed in a different manner.   10. Call clinic with any problems with your cast. Our phone number is [phone]. After hours please phone your PCP or go to the nearest ER.        Main Provider: Alyssa Rosenthal PA-C March 8, 2024    Call us at [phone] if you have any questions about your wounds, have redness or " swelling around your wound, have a fever of 101 degrees Fahrenheit or greater or if you have any other problems or concerns. We answer the phone Monday through Friday 8 am to 4 pm, please leave a message as we check the voicemail frequently throughout the day.     If you had a positive experience please indicate that on your patient satisfaction survey form that [facility] will be sending you.    It was a pleasure meeting with you today.  Thank you for allowing me and my team the privilege of caring for you today.  YOU are the reason we are here, and I truly hope we provided you with the excellent service you deserve.  Please let us know if there is anything else we can do for you so that we can be sure you are leaving completely satisfied with your care experience.      If you have any billing related questions please call the [facility] Business office at [phone]. The clinic staff does not handle billing related matters.    If you are scheduled to have a follow up appointment, you will receive a reminder call the day before your visit. On the appointment day please arrive 15 minutes prior to your appointment time. If you are unable to keep that appointment, please call the clinic to cancel or reschedule. If you are more than 10 minutes late or greater for your scheduled appointment time, the clinic policy is that you may be asked to reschedule.

## 2024-03-13 NOTE — ADDENDUM NOTE
Encounter addended by: Alyssa Rosenthal PA-C on: 3/13/2024 4:41 PM   Actions taken: Clinical Note Signed

## 2024-03-13 NOTE — TELEPHONE ENCOUNTER
FYI - Status Update    Who is Calling: patient    Update: Pt procedure is scheduled for April 17th and was hoping to extend the Pre-op qualifications until then.     Does caller want a call/response back: Yes     Could we send this information to you in [employer] or would you prefer to receive a phone call?:   Patient would prefer a phone call   Okay to leave a detailed message?: Yes at Cell number on file:    Telephone Information:   Mobile [phone]

## 2024-03-13 NOTE — TELEPHONE ENCOUNTER
Spoke w/ Pt who advised that the surgeon approved this request for an extension and is only needing an addendum to the Pre-op stating that the pre-op clears the pt through 4/17/2024. Once addended please fax to surgeons office.     Margaert Juarez

## 2024-03-14 ENCOUNTER — HOSPITAL ENCOUNTER (OUTPATIENT)
Dept: WOUND CARE | Facility: CLINIC | Age: 64
Discharge: HOME | End: 2024-03-14
Payer: COMMERCIAL

## 2024-03-14 VITALS — TEMPERATURE: 98.2 F | DIASTOLIC BLOOD PRESSURE: 73 MMHG | HEART RATE: 44 BPM | SYSTOLIC BLOOD PRESSURE: 169 MMHG

## 2024-03-14 DIAGNOSIS — E11.621 DIABETIC ULCER OF OTHER PART OF LEFT FOOT ASSOCIATED WITH TYPE 2 DIABETES MELLITUS, WITH FAT LAYER EXPOSED (H): Primary | ICD-10-CM

## 2024-03-14 DIAGNOSIS — L97.522 DIABETIC ULCER OF OTHER PART OF LEFT FOOT ASSOCIATED WITH TYPE 2 DIABETES MELLITUS, WITH FAT LAYER EXPOSED (H): Primary | ICD-10-CM

## 2024-03-14 PROCEDURE — 29445 APPL RIGID TOT CNTC LEG CAST: CPT

## 2024-03-14 PROCEDURE — 99213 OFFICE O/P EST LOW 20 MIN: CPT | Mod: 25

## 2024-03-14 PROCEDURE — 97602 WOUND(S) CARE NON-SELECTIVE: CPT

## 2024-03-14 NOTE — DISCHARGE INSTRUCTIONS
Judie Rojas   1960      A DME order was not completed because the patient is having dressing changes  done at [facility]. PLEASE BRING ANY SUPPLIES THAT WERE GIVEN TO YOU WITH YOU  TO YOUR NEXT VISIT.      PLAN:  Epifix 14mm applied 3/8/24 & 3/14/24   Continue to wear cast protector in shower  Continue to minimize walking/weight bearing  Keep blood sugars below 150 and A1C below 7  Whenever you sit, raise your ankle above your hips to promote wound healing  Reach out to [facility] to get new orthotics (order sent 09/2023): # [phone]    Wound Dressing Change: Right toe 3  -Cleanse with mild unscented soap and water (such as Cetaphil, Cerave or Dove)   -Apply a small piece of endoform to wound bed  -Apply a band-aid  -change daily or as needed for soilage.       Wound Dressing Change: Left Plantar Foot  - Prophase protocol  - Cleanse with mild unscented soap (such as Cetaphil, Cerave or Dove) and water  - Apply small amount of 0.25% ACETIC ACID on gauze, lay into wound bed, let sit for 90 seconds, remove gauze (do not rinse)  - Apply Triad paste to periwound skin; Apply Atractain to intact skin otherwise  - Epifix 14mm applied to wound followed by Mepitel and Sterile Strips  - Cover with Melgisorb  - Cover with Drawtex pad  - Apply Spandage size 7  - Apply Exudry and secure with TCC-Essity 1st layer  - Apply TCC-Essity  Change weekly at [facility]      Protein:  A diet high in protein is important for wound healing, we recommend getting up to  90 grams of protein per day.  Taking protein shakes or bars are a good way to get extra protein in your diet.    TCC-Essity:  The TCC-Essity Total Contact Cast that has been placed on your foot and leg by your  doctor has been proven to be an effective method of off-loading your foot. It is  important that you understand potential problems with the cast so they can be  avoided. Please follow the instructions below during your care.  1. Limit activity for first 24 hours after cast is  "applied  2. The outer boot MUST always be worn when walking.  3. Do not insert anything under the cast. Injury to your skin can be very dangerous.  4. Subsequent cast changes will take place from 1-2 times per week as instructed.  5. . If the cast is \"loose\" or \"rubbing\" or causing pain, please call us- it may need to be  changed. Or if you develop a fever, chills, nausea, or vomiting the cast must be  removed to check the wound  6. Keep your cast dry. If the cast gets wet, it must be changed immediately. (Use a  protective cast bag when you are bathing.)  7. If the cast is removed in the hospital or ER the cast technician must be notified  there is only padding on the front over the shin, on the ankles and over the foot and toes.  8. You will need to wear a cast for 1-2 additional weeks after your wound has healed  or until your skin has returned to normal thickness.  9. If the Clinic is closed proceed to the nearest emergency room or urgent care  This is a non-traditional cast and must be removed in a different manner.  10. Call clinic with any problems with your cast. Our phone number is [phone].  After hours please phone your PCP or go to the nearest ER.       Main Provider: Colette Fisher NP March 14, 2024    Call us at [phone] if you have any questions about your wounds, have redness or swelling around your wound, have a fever of 101 degrees Fahrenheit or greater or if you have any other problems or concerns. We answer the phone Monday through Friday 8 am to 4 pm, please leave a message as we check the voicemail frequently throughout the day.     If you had a positive experience please indicate that on your patient satisfaction survey form that [facility] will be sending you.    It was a pleasure meeting with you today.  Thank you for allowing me and my team the privilege of caring for you today.  YOU are the reason we are here, and I truly hope we provided you with the excellent service you " deserve.  Please let us know if there is anything else we can do for you so that we can be sure you are leaving completely satisfied with your care experience.      If you have any billing related questions please call the [facility] Business office at [phone]. The clinic staff does not handle billing related matters.    If you are scheduled to have a follow up appointment, you will receive a reminder call the day before your visit. On the appointment day please arrive 15 minutes prior to your appointment time. If you are unable to keep that appointment, please call the clinic to cancel or reschedule. If you are more than 10 minutes late or greater for your scheduled appointment time, the clinic policy is that you may be asked to reschedule.

## 2024-03-14 NOTE — PROGRESS NOTES
"[facility]    ASSESSMENT:   Diabetic ulcer of other part of left foot associated with type 2 diabetes mellitus, with fat layer exposed   Traumatic toenail removal right foot    PLAN/DISCUSSION:   2nd Epifix application today, 14 mm disc, [id number] exp 11/01/2028  Acetic acid soak performed then flushed with saline. Epifix affixed with mepitel, steri strips, melgisorb, drawtex. Additional layer of 3\" cast wrap applied around foot due to hx of cracking his cast   For right toenail wound-Endoform to be applied     Interval Hx:   3/14: Returns to clinic. Minimal green drainage, overall less drainage noted. Wound is granular with noted improvement of depth of the lateral edge of wound.     HISTORY OF PRESENT ILLNESS:   Patient was seen at wound center for L foot wound. Previously followed by Dr. Soto. Per her note: \"He now follows up for his left foot, following second metatarsal resection on 10/19. Had an MRI that confirmed osteomyelitis and was scheduled for surgery quickly afterwards. Still takes augmentin long term per Id for chronic osteomyelitis.\"     2/1: TCC EZ started weekly.      2/20: TCC EZ cracked at ankle. Changed to Essity cast system and ActiCoat 7 as primary dressing. Green drainage so started on cipro and cultured - resistant to orals.     2/23: Wound improved and green drainage lessened. Started topical gent on wound and periwound. No issues with Essity cast.     2/29: Wound bed appears . Unable to do 3C patch given low platelets and DOAC. Continue Gentamicin ointment and Essity cast.       3/4: Stepped in water 3/1 and cast broke. Foot macerated with DTI of heel. Drawtex added to regimen.    3/8: Rets to clinic for cast change. Minimal green drainage, wound granular. No maceration.     Patient Active Problem List   Diagnosis    Calculus of kidney    Chronic left shoulder pain    Type 2 diabetes mellitus with peripheral neuropathy (H)    Hyperlipidemia LDL goal " <70    Essential hypertension    Right bundle branch block    GILA (obstructive sleep apnea)    Diabetic ulcer of lower extremity (H)    PVD (peripheral vascular disease) (H24)    Scoliosis of thoracic spine, unspecified scoliosis type    Other sequelae following unspecified cerebrovascular disease    Cervical pain    Bilateral thoracic back pain    Osteomyelitis of great toe of right foot (H)    Acute deep vein thrombosis (DVT) of tibial vein of right lower extremity (H)    Abnormal CT of liver    Benign neoplasm of transverse colon    Duodenitis    Hemorrhoids    Polyp of colon    Acute osteomyelitis of right foot (H)    Essential tremor    Nonalcoholic steatohepatitis    Otalgia of left ear    Shoulder pain    Liver cirrhosis secondary to SNYDER (H)    Gastro-esophageal reflux disease with esophagitis    Chronic osteomyelitis of right foot (H)    Chronic foot ulcer with fat layer exposed, left (H)    Diabetic ulcer of left foot with fat layer exposed (H)    Class 2 severe obesity due to excess calories with serious comorbidity in adult (H)       MEDICATIONS:   Current Outpatient Medications   Medication    amLODIPine (NORVASC) 5 MG tablet    atorvastatin (LIPITOR) 40 MG tablet    blood glucose (NO BRAND SPECIFIED) lancets standard    calcium carbonate (OS-NARA) 500 MG tablet    Cholecalciferol (VITAMIN D3) 25 MCG (1000 UT) CAPS    Co-Enzyme Q10 100 MG CAPS    Continuous Blood Gluc Sensor (FREESTYLE LUCERO 14 DAY SENSOR) MISC    ELIQUIS ANTICOAGULANT 5 MG tablet    ferrous sulfate (FEROSUL) 325 (65 Fe) MG tablet    gabapentin (NEURONTIN) 100 MG capsule    gentamicin (GARAMYCIN) 0.1 % external ointment    hydrochlorothiazide (MICROZIDE) 12.5 MG capsule    Insulin Aspart FlexPen 100 UNIT/ML SOPN    insulin degludec (TRESIBA FLEXTOUCH) 100 UNIT/ML pen    insulin pen needle (B-D U/F) 31G X 5 MM miscellaneous    ketoconazole (NIZORAL) 2 % external shampoo    lisinopril (ZESTRIL) 40 MG tablet    MAGNESIUM GLYCINATE PLUS PO     metFORMIN (GLUCOPHAGE XR) 500 MG 24 hr tablet    Multiple Vitamins-Minerals (MULTIVITAMIN PO)    mupirocin (BACTROBAN) 2 % external ointment    Omega-3 Fatty Acids (OMEGA-3 FISH OIL PO)    omeprazole (PRILOSEC) 40 MG DR capsule    propranolol (INDERAL) 20 MG tablet    Semaglutide, 2 MG/DOSE, (OZEMPIC) 8 MG/3ML pen    tadalafil (CIALIS) 5 MG tablet     No current facility-administered medications for this encounter.       VITALS: BP (!) 169/73   Pulse (!) 44   Temp 98.2  F (36.8  C) (Temporal)      PHYSICAL EXAM:  GENERAL: Patient is alert and oriented and in no acute distress  INTEGUMENTARY:   Wound (used by OP [facility] only) 09/25/23 1505 Left plantar foot surgical (Active)   Thickness/Stage full thickness 03/14/24 0907   Base pink 03/14/24 0907   Periwound intact;macerated 03/14/24 0907   Periwound Temperature warm 03/14/24 0907   Periwound Skin Turgor soft 03/14/24 0907   Edges callused 03/14/24 0907   Length (cm) 1.4 03/14/24 0907   Width (cm) 1.4 03/14/24 0907   Depth (cm) 0.1 03/14/24 0907   Wound (cm^2) 1.96 cm^2 03/14/24 0907   Wound Volume (cm^3) 0.2 cm^3 03/14/24 0907   Wound healing % -243.86 03/14/24 0907   Drainage Characteristics/Odor green;serosanguineous 03/14/24 0907   Drainage Amount moderate 03/14/24 0907   Care, Wound non-select wound debridement performed. 03/14/24 0907       Wound (used by OP [facility] only) 03/14/24 0918 Right 3 toe other (see comments) (Active)   Thickness/Stage full thickness 03/14/24 0907   Base red 03/14/24 0907   Periwound intact;swelling 03/14/24 0907   Periwound Temperature warm 03/14/24 0907   Periwound Skin Turgor soft 03/14/24 0907   Edges open 03/14/24 0907   Length (cm) 0.2 03/14/24 0907   Width (cm) 0.2 03/14/24 0907   Depth (cm) 0.1 03/14/24 0907   Wound (cm^2) 0.04 cm^2 03/14/24 0907   Wound Volume (cm^3) 0 cm^3 03/14/24 0907   Drainage Characteristics/Odor serosanguineous 03/14/24 0907   Drainage Amount moderate 03/14/24 0907   Care, Wound non-select wound  debridement performed. 03/14/24 0907               PROCEDURES: Mechanical debridement was performed with cleansing of the wound by the nursing staff      PATIENT INSTRUCTIONS      Further instructions from your care team         Judie Rojas   1960      A DME order was not completed because the patient is having dressing changes  done at [facility]. PLEASE BRING ANY SUPPLIES THAT WERE GIVEN TO YOU WITH YOU  TO YOUR NEXT VISIT.      PLAN:  Epifix 14mm applied 3/8/24 & 3/14/24   Continue to wear cast protector in shower  Continue to minimize walking/weight bearing  Keep blood sugars below 150 and A1C below 7  Whenever you sit, raise your ankle above your hips to promote wound healing  Reach out to [facility] to get new orthotics (order sent 09/2023): # [phone]    Wound Dressing Change: Right toe 3  -Cleanse with mild unscented soap and water (such as Cetaphil, Cerave or Dove)   -Apply a small piece of endoform to wound bed  -Apply a band-aid  -change daily or as needed for soilage.       Wound Dressing Change: Left Plantar Foot  - Prophase protocol  - Cleanse with mild unscented soap (such as Cetaphil, Cerave or Dove) and water  - Apply small amount of 0.25% ACETIC ACID on gauze, lay into wound bed, let sit for 90 seconds, remove gauze (do not rinse)  - Apply Triad paste to periwound skin; Apply Atractain to intact skin otherwise  - Epifix 14mm applied to wound followed by Mepitel and Sterile Strips  - Cover with Melgisorb  - Cover with Drawtex pad  - Apply Spandage size 7  - Apply Exudry and secure with TCC-Essity 1st layer  - Apply TCC-Essity  Change weekly at [facility]      Protein:  A diet high in protein is important for wound healing, we recommend getting up to  90 grams of protein per day.  Taking protein shakes or bars are a good way to get extra protein in your diet.    TCC-Essity:  The TCC-Essity Total Contact Cast that has been placed on your foot and leg by your  doctor has been proven to be an effective method  "of off-loading your foot. It is  important that you understand potential problems with the cast so they can be  avoided. Please follow the instructions below during your care.  1. Limit activity for first 24 hours after cast is applied  2. The outer boot MUST always be worn when walking.  3. Do not insert anything under the cast. Injury to your skin can be very dangerous.  4. Subsequent cast changes will take place from 1-2 times per week as instructed.  5. . If the cast is \"loose\" or \"rubbing\" or causing pain, please call us- it may need to be  changed. Or if you develop a fever, chills, nausea, or vomiting the cast must be  removed to check the wound  6. Keep your cast dry. If the cast gets wet, it must be changed immediately. (Use a  protective cast bag when you are bathing.)  7. If the cast is removed in the hospital or ER the cast technician must be notified  there is only padding on the front over the shin, on the ankles and over the foot and toes.  8. You will need to wear a cast for 1-2 additional weeks after your wound has healed  or until your skin has returned to normal thickness.  9. If the Clinic is closed proceed to the nearest emergency room or urgent care  This is a non-traditional cast and must be removed in a different manner.  10. Call clinic with any problems with your cast. Our phone number is [phone].  After hours please phone your PCP or go to the nearest ER.       Main Provider: Colette Fisher NP March 14, 2024    Call us at [phone] if you have any questions about your wounds, have redness or swelling around your wound, have a fever of 101 degrees Fahrenheit or greater or if you have any other problems or concerns. We answer the phone Monday through Friday 8 am to 4 pm, please leave a message as we check the voicemail frequently throughout the day.     If you had a positive experience please indicate that on your patient satisfaction survey form that [facility] will be sending " you.    It was a pleasure meeting with you today.  Thank you for allowing me and my team the privilege of caring for you today.  YOU are the reason we are here, and I truly hope we provided you with the excellent service you deserve.  Please let us know if there is anything else we can do for you so that we can be sure you are leaving completely satisfied with your care experience.      If you have any billing related questions please call the [facility] Business office at [phone]. The clinic staff does not handle billing related matters.    If you are scheduled to have a follow up appointment, you will receive a reminder call the day before your visit. On the appointment day please arrive 15 minutes prior to your appointment time. If you are unable to keep that appointment, please call the clinic to cancel or reschedule. If you are more than 10 minutes late or greater for your scheduled appointment time, the clinic policy is that you may be asked to reschedule.          Electronically signed by Colette Fisher CNP on March 14, 2024

## 2024-03-15 DIAGNOSIS — I10 BENIGN ESSENTIAL HYPERTENSION: ICD-10-CM

## 2024-03-15 RX ORDER — AMLODIPINE BESYLATE 5 MG/1
5 TABLET ORAL 2 TIMES DAILY
Qty: 180 TABLET | Refills: 3 | Status: ON HOLD | OUTPATIENT
Start: 2024-03-15 | End: 2024-06-17

## 2024-03-21 ENCOUNTER — OFFICE VISIT (OUTPATIENT)
Dept: FAMILY MEDICINE | Facility: CLINIC | Age: 64
End: 2024-03-21
Attending: FAMILY MEDICINE
Payer: COMMERCIAL

## 2024-03-21 DIAGNOSIS — L98.9 SKIN LESION: ICD-10-CM

## 2024-03-21 DIAGNOSIS — L72.0 EIC (EPIDERMAL INCLUSION CYST): Primary | ICD-10-CM

## 2024-03-21 PROCEDURE — 99203 OFFICE O/P NEW LOW 30 MIN: CPT | Mod: 25 | Performed by: PHYSICIAN ASSISTANT

## 2024-03-21 PROCEDURE — 11900 INJECT SKIN LESIONS </W 7: CPT | Performed by: PHYSICIAN ASSISTANT

## 2024-03-21 ASSESSMENT — PAIN SCALES - GENERAL: PAINLEVEL: NO PAIN (0)

## 2024-03-21 NOTE — PROGRESS NOTES
[facility] Dermatology Note  Encounter Date: Mar 21, 2024  Office Visit      Dermatology Problem List:    Cysts  x 3 , L lateral cheek  - ILK: 3/21/24    PMHx: DM type 2  ____________________________________________    Assessment & Plan:  # Cysts, L side of his face.  -  Discussed the natural history and benign nature of this lesion. Reassurance provided that no additional treatment is necessary unless they are bothersome. Patient noted that they become bothersome when he shaves his face.   - We discussed possible treatments such as steroid injections. He is in agreement to trial.   - ILK received today, see procedure note below.  - Future: excision with derm surg or MD or plastics    Procedures Performed:   - Intra-lesional triamcinolone procedure note. After verbal consent and review of risk of pain and skin thinning/atrophy, positioning and cleansing with isopropyl alcohol, 0.3  total mL of triamcinolone 5 mg/mL was injected into 3 lesion(s) on the L lateral cheek. The patient tolerated the procedure well and left the dermatology clinic in   good condition.    Clinic Administered Medication Documentation    Patient was given kenalog 10 mg/ml. Prior to medication administration, verified patient's identity using patient s name and date of birth. Please see MAR and medication order for additional information. Patient instructed to remain in clinic for 15 minutes and report any adverse reaction to staff immediately.    Vial/Syringe: Multi dose vial    Follow-up: prn for new or changing lesions    Staff and scribe:    I, Sherri Cam, am serving as a scribe to document services personally performed by Wilma Stokes PA-C based on data collection and the provider's statements to me.       All risks, benefits and alternatives were discussed with patient.  Patient is in agreement and understands the assessment and plan.  All questions were answered.    Wilma Stokes PA-C, MPAS  [facility]  [facility]: Phone: [phone], Fax: [phone]  [facility]: Phone: [phone],  Fax: [phone]  [facility] [place]: Phone: [phone], Fax: [phone]  ____________________________________________    CC: Derm Problem (Look at a few lesions on L side of face)      Reviewed patients past medical history and pertinent chart review prior to patient's visit today.     HPI:  Mr. Crispin Rojas is a 64 year old male who presents today as a new patient for spot check.     Today patient reported a spot of concern on the L side of his face. Has been present for about 6 -9 months. Reports that the size increases and then decrease. Occasionally gets pus out of them    Patient is otherwise feeling well, without additional concerns.    Labs:  N/A    Physical Exam:  Vitals: There were no vitals taken for this visit.  SKIN: Focused examination of L side of his face was performed.   -L lateral cheek there is x 3 indurated papules consistent with small, indurated cysts  - No other lesions of concern on areas examined.     Medications:  Current Outpatient Medications   Medication    amLODIPine (NORVASC) 5 MG tablet    atorvastatin (LIPITOR) 40 MG tablet    blood glucose (NO BRAND SPECIFIED) lancets standard    calcium carbonate (OS-NARA) 500 MG tablet    Cholecalciferol (VITAMIN D3) 25 MCG (1000 UT) CAPS    Co-Enzyme Q10 100 MG CAPS    Continuous Blood Gluc Sensor (FREESTYLE LUCERO 14 DAY SENSOR) MISC    ELIQUIS ANTICOAGULANT 5 MG tablet    ferrous sulfate (FEROSUL) 325 (65 Fe) MG tablet    gabapentin (NEURONTIN) 100 MG capsule    gentamicin (GARAMYCIN) 0.1 % external ointment    hydrochlorothiazide (MICROZIDE) 12.5 MG capsule    Insulin Aspart FlexPen 100 UNIT/ML SOPN    insulin degludec (TRESIBA FLEXTOUCH) 100 UNIT/ML pen    insulin pen needle (B-D U/F) 31G X 5 MM miscellaneous    ketoconazole (NIZORAL) 2 % external shampoo     lisinopril (ZESTRIL) 40 MG tablet    MAGNESIUM GLYCINATE PLUS PO    metFORMIN (GLUCOPHAGE XR) 500 MG 24 hr tablet    Multiple Vitamins-Minerals (MULTIVITAMIN PO)    mupirocin (BACTROBAN) 2 % external ointment    Omega-3 Fatty Acids (OMEGA-3 FISH OIL PO)    omeprazole (PRILOSEC) 40 MG DR capsule    propranolol (INDERAL) 20 MG tablet    Semaglutide, 2 MG/DOSE, (OZEMPIC) 8 MG/3ML pen    tadalafil (CIALIS) 5 MG tablet     No current facility-administered medications for this visit.      Past Medical/Surgical History:   Patient Active Problem List   Diagnosis    Calculus of kidney    Chronic left shoulder pain    Type 2 diabetes mellitus with peripheral neuropathy (H)    Hyperlipidemia LDL goal <70    Essential hypertension    Right bundle branch block    GILA (obstructive sleep apnea)    Diabetic ulcer of lower extremity (H)    PVD (peripheral vascular disease) (H24)    Scoliosis of thoracic spine, unspecified scoliosis type    Other sequelae following unspecified cerebrovascular disease    Cervical pain    Bilateral thoracic back pain    Osteomyelitis of great toe of right foot (H)    Acute deep vein thrombosis (DVT) of tibial vein of right lower extremity (H)    Abnormal CT of liver    Benign neoplasm of transverse colon    Duodenitis    Hemorrhoids    Polyp of colon    Acute osteomyelitis of right foot (H)    Essential tremor    Nonalcoholic steatohepatitis    Otalgia of left ear    Shoulder pain    Liver cirrhosis secondary to SNYDER (H)    Gastro-esophageal reflux disease with esophagitis    Chronic osteomyelitis of right foot (H)    Chronic foot ulcer with fat layer exposed, left (H)    Diabetic ulcer of left foot with fat layer exposed (H)    Class 2 severe obesity due to excess calories with serious comorbidity in adult (H)     Past Medical History:   Diagnosis Date    Antiplatelet or antithrombotic long-term use     Ascending aorta dilatation (H24)     Cerebral artery occlusion with cerebral infarction (H)      Cerebral infarction (H)     2010    Gastroesophageal reflux disease with esophagitis     H/O blood clots 2022    Hyperlipidemia LDL goal <70     Hypertension     Nonalcoholic steatohepatitis 11/30/2022    Numbness and tingling     Obesity     GILA (obstructive sleep apnea) 10/22/2018    CPAP intolerant    PVD (peripheral vascular disease) (H24)     s/p left partial toe amputation    Renal stone     Tremor     Type 2 diabetes mellitus with diabetic polyneuropathy, with long-term current use of insulin (H)

## 2024-03-21 NOTE — LETTER
3/21/2024         RE: Crispin Rojas  [address] [facility] [address]        Dear Colleague,    Thank you for referring your patient, Crispin Rojas, to the [facility] [place]. Please see a copy of my visit note below.    [facility] Dermatology Note  Encounter Date: Mar 21, 2024  Office Visit      Dermatology Problem List:    Cysts  x 3 , L lateral cheek  - ILK: 3/21/24    PMHx: DM type 2  ____________________________________________    Assessment & Plan:  # Cysts, L side of his face.  -  Discussed the natural history and benign nature of this lesion. Reassurance provided that no additional treatment is necessary unless they are bothersome. Patient noted that they become bothersome when he shaves his face.   - We discussed possible treatments such as steroid injections. He is in agreement to trial.   - ILK received today, see procedure note below.  - Future: excision with derm surg or MD or plastics    Procedures Performed:   - Intra-lesional triamcinolone procedure note. After verbal consent and review of risk of pain and skin thinning/atrophy, positioning and cleansing with isopropyl alcohol, 0.3  total mL of triamcinolone 5 mg/mL was injected into 3 lesion(s) on the L lateral cheek. The patient tolerated the procedure well and left the dermatology clinic in   good condition.    Clinic Administered Medication Documentation    Patient was given kenalog 10 mg/ml. Prior to medication administration, verified patient's identity using patient s name and date of birth. Please see MAR and medication order for additional information. Patient instructed to remain in clinic for 15 minutes and report any adverse reaction to staff immediately.    Vial/Syringe: Multi dose vial    Follow-up: prn for new or changing lesions    Staff and scribe:    ISherri, am serving as a scribe to document services personally performed by Wilma Stokes PA-C based on data  collection and the provider's statements to me.       All risks, benefits and alternatives were discussed with patient.  Patient is in agreement and understands the assessment and plan.  All questions were answered.    Wilma Stokes PA-C, MPAS  [facility]: Phone: [phone], Fax: [phone]  [facility]: Phone: [phone],  Fax: [phone]  [facility]: Phone: [phone], Fax: [phone]  ____________________________________________    CC: Derm Problem (Look at a few lesions on L side of face)      Reviewed patients past medical history and pertinent chart review prior to patient's visit today.     HPI:  Mr. Crispin Rojas is a 64 year old male who presents today as a new patient for spot check.     Today patient reported a spot of concern on the L side of his face. Has been present for about 6 -9 months. Reports that the size increases and then decrease. Occasionally gets pus out of them    Patient is otherwise feeling well, without additional concerns.    Labs:  N/A    Physical Exam:  Vitals: There were no vitals taken for this visit.  SKIN: Focused examination of L side of his face was performed.   -L lateral cheek there is x 3 indurated papules consistent with small, indurated cysts  - No other lesions of concern on areas examined.     Medications:  Current Outpatient Medications   Medication     amLODIPine (NORVASC) 5 MG tablet     atorvastatin (LIPITOR) 40 MG tablet     blood glucose (NO BRAND SPECIFIED) lancets standard     calcium carbonate (OS-NARA) 500 MG tablet     Cholecalciferol (VITAMIN D3) 25 MCG (1000 UT) CAPS     Co-Enzyme Q10 100 MG CAPS     Continuous Blood Gluc Sensor (FREESTYLE LUCERO 14 DAY SENSOR) MISC     ELIQUIS ANTICOAGULANT 5 MG tablet     ferrous sulfate (FEROSUL) 325 (65 Fe) MG tablet     gabapentin (NEURONTIN) 100 MG capsule     gentamicin (GARAMYCIN) 0.1 %  external ointment     hydrochlorothiazide (MICROZIDE) 12.5 MG capsule     Insulin Aspart FlexPen 100 UNIT/ML SOPN     insulin degludec (TRESIBA FLEXTOUCH) 100 UNIT/ML pen     insulin pen needle (B-D U/F) 31G X 5 MM miscellaneous     ketoconazole (NIZORAL) 2 % external shampoo     lisinopril (ZESTRIL) 40 MG tablet     MAGNESIUM GLYCINATE PLUS PO     metFORMIN (GLUCOPHAGE XR) 500 MG 24 hr tablet     Multiple Vitamins-Minerals (MULTIVITAMIN PO)     mupirocin (BACTROBAN) 2 % external ointment     Omega-3 Fatty Acids (OMEGA-3 FISH OIL PO)     omeprazole (PRILOSEC) 40 MG DR capsule     propranolol (INDERAL) 20 MG tablet     Semaglutide, 2 MG/DOSE, (OZEMPIC) 8 MG/3ML pen     tadalafil (CIALIS) 5 MG tablet     No current facility-administered medications for this visit.      Past Medical/Surgical History:   Patient Active Problem List   Diagnosis     Calculus of kidney     Chronic left shoulder pain     Type 2 diabetes mellitus with peripheral neuropathy (H)     Hyperlipidemia LDL goal <70     Essential hypertension     Right bundle branch block     GILA (obstructive sleep apnea)     Diabetic ulcer of lower extremity (H)     PVD (peripheral vascular disease) (H24)     Scoliosis of thoracic spine, unspecified scoliosis type     Other sequelae following unspecified cerebrovascular disease     Cervical pain     Bilateral thoracic back pain     Osteomyelitis of great toe of right foot (H)     Acute deep vein thrombosis (DVT) of tibial vein of right lower extremity (H)     Abnormal CT of liver     Benign neoplasm of transverse colon     Duodenitis     Hemorrhoids     Polyp of colon     Acute osteomyelitis of right foot (H)     Essential tremor     Nonalcoholic steatohepatitis     Otalgia of left ear     Shoulder pain     Liver cirrhosis secondary to SNYDER (H)     Gastro-esophageal reflux disease with esophagitis     Chronic osteomyelitis of right foot (H)     Chronic foot ulcer with fat layer exposed, left (H)     Diabetic ulcer  of left foot with fat layer exposed (H)     Class 2 severe obesity due to excess calories with serious comorbidity in adult (H)     Past Medical History:   Diagnosis Date     Antiplatelet or antithrombotic long-term use      Ascending aorta dilatation (H24)      Cerebral artery occlusion with cerebral infarction (H)      Cerebral infarction (H)     2010     Gastroesophageal reflux disease with esophagitis      H/O blood clots 2022     Hyperlipidemia LDL goal <70      Hypertension      Nonalcoholic steatohepatitis 11/30/2022     Numbness and tingling      Obesity      GILA (obstructive sleep apnea) 10/22/2018    CPAP intolerant     PVD (peripheral vascular disease) (H24)     s/p left partial toe amputation     Renal stone      Tremor      Type 2 diabetes mellitus with diabetic polyneuropathy, with long-term current use of insulin (H)                         Again, thank you for allowing me to participate in the care of your patient.        Sincerely,        Wilma Stokes PA-C

## 2024-03-21 NOTE — PATIENT INSTRUCTIONS
Patient Education       Proper skin care from [facility]:    -Eliminate harsh soaps as they strip the natural oils from the skin, often resulting in dry itchy skin ( i.e. Dial, Zest, [place])  -Use mild soaps such as Cetaphil or Dove Sensitive Skin in the shower. You do not need to use soap on arms, legs, and trunk every time you shower unless visibly soiled.   -Avoid hot or cold showers.  -After showering, lightly dry off and apply moisturizing within 2-3 minutes. This will help trap moisture in the skin.   -Aggressive use of a moisturizer at least 1-2 times a day to the entire body (including -Vanicream, Cetaphil, Aquaphor or Cerave) and moisturize hands after every washing.  -We recommend using moisturizers that come in a tub that needs to be scooped out, not a pump. This has more of an oil base. It will hold moisture in your skin much better than a water base moisturizer. The above recommended are non-pore clogging.      Wear a sunscreen with at least SPF 30 on your face, ears, neck and V of the chest daily. Wear sunscreen on other areas of the body if those areas are exposed to the sun throughout the day. Sunscreens can contain physical and/or chemical blockers. Physical blockers are less likely to clog pores, these include zinc oxide and titanium dioxide. Reapply every two hour and after swimming.     Sunscreen examples: https://www.ewg.org/sunscreen/    UV radiation  UVA radiation remains constant throughout the day and throughout the year. It is a longer wavelength than UVB and therefore penetrates deeper into the skin leading to immediate and delayed tanning, photoaging, and skin cancer. 70-80% of UVA and UVB radiation occurs between the hours of 10am-2pm.  UVB radiation  UVB radiation causes the most harmful effects and is more significant during the summer months. However, snow and ice can reflect UVB radiation leading to skin damage during the winter months as well. UVB radiation is  responsible for tanning, burning, inflammation, delayed erythema (pinkness), pigmentation (brown spots), and skin cancer.     I recommend self monthly full body exams and yearly full body exams with a dermatology provider. If you develop a new or changing lesion please follow up for examination. Most skin cancers are pink and scaly or pink and pearly. However, we do see blue/brown/black skin cancers.  Consider the ABCDEs of melanoma when giving yourself your monthly full body exam ( don't forget the groin, buttocks, feet, toes, etc). A-asymmetry, B-borders, C-color, D-diameter, E-elevation or evolving. If you see any of these changes please follow up in clinic. If you cannot see your back I recommend purchasing a hand held mirror to use with a larger wall mirror.       Checking for Skin Cancer  You can find cancer early by checking your skin each month. There are 3 kinds of skin cancer. They are melanoma, basal cell carcinoma, and squamous cell carcinoma. Doing monthly skin checks is the best way to find new marks or skin changes. Follow the instructions below for checking your skin.   The ABCDEs of checking moles for melanoma   Check your moles or growths for signs of melanoma using ABCDE:   Asymmetry: the sides of the mole or growth don t match  Border: the edges are ragged, notched, or blurred  Color: the color within the mole or growth varies  Diameter: the mole or growth is larger than 6 mm (size of a pencil eraser)  Evolving: the size, shape, or color of the mole or growth is changing (evolving is not shown in the images below)    Checking for other types of skin cancer  Basal cell carcinoma or squamous cell carcinoma have symptoms such as:     A spot or mole that looks different from all other marks on your skin  Changes in how an area feels, such as itching, tenderness, or pain  Changes in the skin's surface, such as oozing, bleeding, or scaliness  A sore that does not heal  New swelling or redness beyond  the border of a mole    Who s at risk?  Anyone can get skin cancer. But you are at greater risk if you have:   Fair skin, light-colored hair, or light-colored eyes  Many moles or abnormal moles on your skin  A history of sunburns from sunlight or tanning beds  A family history of skin cancer  A history of exposure to radiation or chemicals  A weakened immune system  If you have had skin cancer in the past, you are at risk for recurring skin cancer.   How to check your skin  Do your monthly skin checkups in front of a full-length mirror. Check all parts of your body, including your:   Head (ears, face, neck, and scalp)  Torso (front, back, and sides)  Arms (tops, undersides, upper, and lower armpits)  Hands (palms, backs, and fingers, including under the nails)  Buttocks and genitals  Legs (front, back, and sides)  Feet (tops, soles, toes, including under the nails, and between toes)  If you have a lot of moles, take digital photos of them each month. Make sure to take photos both up close and from a distance. These can help you see if any moles change over time.   Most skin changes are not cancer. But if you see any changes in your skin, call your doctor right away. Only he or she can diagnose a problem. If you have skin cancer, seeing your doctor can be the first step toward getting the treatment that could save your life.   [employer] last reviewed this educational content on 4/1/2019 2000-2020 [employer]. [address] [place] [address], [place] [address]. All rights reserved. This information is not intended as a substitute for professional medical care. Always follow your healthcare professional's instructions.       When should I call my doctor?  If you are worsening or not improving, please, contact us or seek urgent care as noted below.     Who should I call with questions (adults)?  [facility] (adult and pediatric): [phone]  [facility]  [place] (adult): [phone]  [facility] ([place], [place], [place] and [place]) [phone]  For urgent needs outside of business hours call the [facility] at [phone] and ask for the dermatology resident on call to be paged  If this is a medical emergency and you are unable to reach an ER, Call 911      If you need a prescription refill, please contact your pharmacy. Refills are approved or denied by our Physicians during normal business hours, Monday through Fridays  Per office policy, refills will not be granted if you have not been seen within the past year (or sooner depending on your child's condition)

## 2024-03-22 ENCOUNTER — HOSPITAL ENCOUNTER (OUTPATIENT)
Dept: WOUND CARE | Facility: CLINIC | Age: 64
Discharge: HOME | End: 2024-03-22
Attending: PHYSICIAN ASSISTANT | Admitting: PHYSICIAN ASSISTANT
Payer: COMMERCIAL

## 2024-03-22 VITALS — SYSTOLIC BLOOD PRESSURE: 149 MMHG | DIASTOLIC BLOOD PRESSURE: 80 MMHG | TEMPERATURE: 98 F | HEART RATE: 55 BPM

## 2024-03-22 DIAGNOSIS — E11.621 DIABETIC ULCER OF LEFT FOOT WITH FAT LAYER EXPOSED (H): Primary | ICD-10-CM

## 2024-03-22 DIAGNOSIS — L97.522 DIABETIC ULCER OF LEFT FOOT WITH FAT LAYER EXPOSED (H): Primary | ICD-10-CM

## 2024-03-22 DIAGNOSIS — E11.621 DIABETIC ULCER OF LEFT MIDFOOT ASSOCIATED WITH TYPE 2 DIABETES MELLITUS, WITH FAT LAYER EXPOSED (H): ICD-10-CM

## 2024-03-22 DIAGNOSIS — L97.422 DIABETIC ULCER OF LEFT MIDFOOT ASSOCIATED WITH TYPE 2 DIABETES MELLITUS, WITH FAT LAYER EXPOSED (H): ICD-10-CM

## 2024-03-22 PROCEDURE — 15275 SKIN SUB GRAFT FACE/NK/HF/G: CPT | Performed by: PHYSICIAN ASSISTANT

## 2024-03-22 PROCEDURE — 29445 APPL RIGID TOT CNTC LEG CAST: CPT | Mod: LT,XU | Performed by: PHYSICIAN ASSISTANT

## 2024-03-22 NOTE — DISCHARGE INSTRUCTIONS
Judie Rojas   1960    A DME order was not completed because the patient is having dressing changes  done at [facility]. PLEASE BRING ANY SUPPLIES THAT WERE GIVEN TO YOU WITH YOU  TO YOUR NEXT VISIT.    PLAN:  Epifix 14mm applied 3/8/24 & 3/14/24 & 3/22/24  Continue to wear cast protector in shower  Continue to minimize walking/weight bearing  Keep blood sugars below 150 and A1C below 7  Whenever you sit, raise your ankle above your hips to promote wound healing  [facility] to [facility] to get new [facility]otics (order sent 09/2023): # [phone]    Wound Dressing Change: Right toe 3  -Cleanse with mild unscented soap and water (such as Cetaphil, Cerave or Dove)  -Apply a small piece of endoform to wound bed  -Apply a band-aid  -change daily or as needed for soilage.    Wound Dressing Change: Left Plantar Foot  - Prophase protocol  - Cleanse with mild unscented soap (such as Cetaphil, Cerave or Dove) and water  - Apply small amount of 0.25% ACETIC ACID on gauze, lay into wound bed, let sit for 90 seconds, remove gauze   - Rinse with saline  - Apply betadine to periwound  - Apply benzoine tincture to periwound  - Epifix 14mm applied to wound followed by Melgisorb, Mepitel and Sterile Strips  - Cover with Drawtex pad  - Apply Spandage size 7  - Apply Exudry and secure with TCC-Essity 1st layer or roll gauze and medipore tape  - Apply TCC-Essity  Change weekly at [facility]      Protein:  A diet high in protein is important for wound healing, we recommend getting up to  90 grams of protein per day.  Taking protein shakes or bars are a good way to get extra protein in your diet.      TCC-Essity:  The TCC-Essity Total Contact Cast that has been placed on your foot and leg by your  doctor has been proven to be an effective method of off-loading your foot. It is  important that you understand potential problems with the cast so they can be  avoided. Please follow the instructions below during your care.  1. Limit activity for first 24  "hours after cast is applied  2. The outer boot MUST always be worn when walking.  3. Do not insert anything under the cast. Injury to your skin can be very dangerous.  4. Subsequent cast changes will take place from 1-2 times per week as instructed.  5. . If the cast is \"loose\" or \"rubbing\" or causing pain, please call us- it may need to be  changed. Or if you develop a fever, chills, nausea, or vomiting the cast must be  removed to check the wound  6. Keep your cast dry. If the cast gets wet, it must be changed immediately. (Use a  protective cast bag when you are bathing.)  7. If the cast is removed in the hospital or ER the cast technician must be notified  there is only padding on the front over the shin, on the ankles and over the foot and toes.  8. You will need to wear a cast for 1-2 additional weeks after your wound has healed  or until your skin has returned to normal thickness.  9. If the Clinic is closed proceed to the nearest emergency room or urgent care.This is a non-traditional cast and must be removed in a different manner.  10. Call clinic with any problems with your cast. Our phone number is [phone].  After hours please phone your PCP or go to the nearest ER.       Main Provider: Alyssa Rosenthal PA-C March 22, 2024    Call us at [phone] if you have any questions about your wounds, if you have redness or swelling around your wound, have a fever of 101 degrees Fahrenheit or greater or if you have any other problems or concerns. We answer the phone Monday through Friday 8 am to 4 pm, please leave a message as we check the voicemail frequently throughout the day. If you have a concern over the weekend, please leave a message and we will return your call Monday. If the need is urgent, go to the ER or urgent care.    If you had a positive experience please indicate that on your patient satisfaction survey form that [facility] will be sending you.    It was a pleasure meeting with you " today.  Thank you for allowing me and my team the privilege of caring for you today.  YOU are the reason we are here, and I truly hope we provided you with the excellent service you deserve.  Please let us know if there is anything else we can do for you so that we can be sure you are leaving completely satisfied with your care experience.      If you have any billing related questions please call the [facility] Business office at [phone]. The clinic staff does not handle billing related matters.    If you are scheduled to have a follow up appointment, you will receive a reminder call the day before your visit. On the appointment day please arrive 15 minutes prior to your appointment time. If you are unable to keep that appointment, please call the clinic to cancel or reschedule. If you are more than 10 minutes late or greater for your scheduled appointment time, the clinic policy is that you may be asked to reschedule.

## 2024-03-25 ENCOUNTER — OFFICE VISIT (OUTPATIENT)
Dept: OTOLARYNGOLOGY | Facility: CLINIC | Age: 64
End: 2024-03-25
Payer: COMMERCIAL

## 2024-03-25 ENCOUNTER — TELEPHONE (OUTPATIENT)
Dept: OTOLARYNGOLOGY | Facility: CLINIC | Age: 64
End: 2024-03-25

## 2024-03-25 VITALS
HEART RATE: 63 BPM | DIASTOLIC BLOOD PRESSURE: 72 MMHG | HEIGHT: 72 IN | WEIGHT: 261.8 LBS | BODY MASS INDEX: 35.46 KG/M2 | OXYGEN SATURATION: 96 % | SYSTOLIC BLOOD PRESSURE: 161 MMHG

## 2024-03-25 DIAGNOSIS — J34.89 NASAL LESION: Primary | ICD-10-CM

## 2024-03-25 PROCEDURE — 88305 TISSUE EXAM BY PATHOLOGIST: CPT | Mod: TC | Performed by: OTOLARYNGOLOGY

## 2024-03-25 PROCEDURE — 88305 TISSUE EXAM BY PATHOLOGIST: CPT | Mod: 26 | Performed by: STUDENT IN AN ORGANIZED HEALTH CARE EDUCATION/TRAINING PROGRAM

## 2024-03-25 PROCEDURE — 30100 INTRANASAL BIOPSY: CPT | Performed by: OTOLARYNGOLOGY

## 2024-03-25 RX ORDER — MUPIROCIN 20 MG/G
OINTMENT TOPICAL
Qty: 22 G | Refills: 3 | Status: SHIPPED | OUTPATIENT
Start: 2024-03-25 | End: 2024-04-04

## 2024-03-25 ASSESSMENT — PAIN SCALES - GENERAL: PAINLEVEL: NO PAIN (0)

## 2024-03-25 NOTE — NURSING NOTE
Chief Complaint   Patient presents with    RECHECK   Blood pressure (!) 161/72, pulse 63, height 1.829 m (6'), weight 118.8 kg (261 lb 12.8 oz), SpO2 96%. Moises Shook, EMT

## 2024-03-25 NOTE — PROGRESS NOTES
HISTORY OF PRESENT ILLNESS:   Crispin Rojas is a 64 year old year old male who presents today with a history of left caudal septal irritation causing crusting and dryness.  He comes in today to have an excision of the septal mucosa.  He has had longstanding issues with crusting and debris from the left side of the nose and has had an area of metaplasia and dryness that never seems to resolve.    After verbal consent is obtained, the left side of the nose is treated topically with lidocaine and Afrin and then injected with 1% lidocaine with 1 100,000 epinephrine.  Under direct visualization an incision is made anterior to the area of septal mucosal disruption, a [place] is used to elevate the abnormal mucosa.  It is sent in formalin to pathology.  Hemostasis is achieved with application of topical Afrin.  The patient tolerated the procedure well.  Follow-up will be in 4 to 5 weeks.  I have reinforced with him the importance of good hydration.    Nikos Armstrong MD

## 2024-03-25 NOTE — LETTER
3/25/2024       RE: Crispin Rojas  [address] [place] [facility] [address]     Dear Colleague,    Thank you for referring your patient, Crispin Rojas, to the [facility] [place] [facility]. Please see a copy of my visit note below.    HISTORY OF PRESENT ILLNESS:   Crispin Rojas is a 64 year old year old male who presents today with a history of left caudal septal irritation causing crusting and dryness.  He comes in today to have an excision of the septal mucosa.  He has had longstanding issues with crusting and debris from the left side of the nose and has had an area of metaplasia and dryness that never seems to resolve.    After verbal consent is obtained, the left side of the nose is treated topically with lidocaine and Afrin and then injected with 1% lidocaine with 1 100,000 epinephrine.  Under direct visualization an incision is made anterior to the area of septal mucosal disruption, a [place] is used to elevate the abnormal mucosa.  It is sent in formalin to pathology.  Hemostasis is achieved with application of topical Afrin.  The patient tolerated the procedure well.  Follow-up will be in 4 to 5 weeks.  I have reinforced with him the importance of good hydration.    Nikos Armstrong MD           Date Performed: 05/20/2018       Time Performed: 19:14:38

 

PTAGE:      79 years

 

EKG:      Sinus rhythm 

 

. Possible left atrial abnormality rSr'(V1) - probable normal variant Left ventricular hypertrophy An
t/septal and lateral ST-T changes are probably due to ventricular hypertrophy Abnormal ECG

 

PREVIOUS TRACING       : 05/20/2018 12.58 No significant change from previous tracing noted.

 

DOCTOR:   Paco Calvert  Interpretating Date/Time  05/20/2018 22:37:27

## 2024-03-25 NOTE — PATIENT INSTRUCTIONS
You were seen in the ENT Clinic today by Dr. Armstrong. If you have any questions or concerns after your appointment, please contact us (see below)     2.   Please return to the clinic in 4-5 weeks.              How to Contact Us:  Send a [employer] message to your provider. Our team will respond to you via [employer]. Occasionally, we will need to call you to get further information.  For urgent matters (Monday-Friday), call the ENT Clinic: [phone] and speak with a call center team member - they will route your call appropriately.   If you'd like to speak directly with a nurse, please find our contact information below. We do our best to check voicemail frequently throughout the day, and will work to call you back within 1-2 days. For urgent matters, please use the general clinic phone numbers listed above.        Adelina ORTIZ RN  ENT RN Care Coordinator  Direct: [phone]  Naye MCKINLEY LPN  Direct: [phone]           [facility]  Department of Otolaryngology

## 2024-03-26 NOTE — PROGRESS NOTES
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 Iodine to periwound, Epifix to wound bed followed by Melgisorb, Mepitel, drawtex, spandage, exudry, essity cast  Recommend he bring gent next week    Interval Hx:   3/22: Returns to clinic. Decreasing in size. More macerated today.     HISTORY OF PRESENT ILLNESS:   Patient was seen at wound center for L foot wound. Previously followed by Dr. Soto. Per her note: \"He now follows up for his left foot, following second metatarsal resection on 10/19. Had an MRI that confirmed osteomyelitis and was scheduled for surgery quickly afterwards. Still takes augmentin long term per Id for chronic osteomyelitis.\"     2/1: TCC EZ started weekly.      2/20: TCC EZ cracked at ankle. Changed to Essity cast system and ActiCoat 7 as primary dressing. Green drainage so started on cipro and cultured - resistant to orals.     2/23: Wound improved and green drainage lessened. Started topical gent on wound and periwound. No issues with Essity cast.     2/29: Wound bed appears . Unable to do 3C patch given low platelets and DOAC. Continue Gentamicin ointment and Essity cast.       3/4: Stepped in water 3/1 and cast broke. Foot macerated with DTI of heel. Drawtex added to regimen.    3/8: Minimal green drainage, wound granular. EpiFix #1 applied.     3/14: Minimal green drainage, overall less drainage noted. Wound is granular with noted improvement of depth of the lateral edge of wound. EpiFix #2 applied, melgisorb, mepitel, spandage, exudry, TCC.     VITALS: BP (!) 149/80 (BP Location: Left arm, Patient Position: Sitting)   Pulse 55   Temp 98  F (36.7  C) (Temporal)      PHYSICAL EXAM:  GENERAL: Patient is alert and oriented and " in no acute distress  INTEGUMENTARY:   Wound (used by [facility] only) 09/25/23 1505 Left plantar foot surgical (Active)   Thickness/Stage full thickness 03/22/24 1517   Base pink 03/22/24 1517   Periwound intact;macerated 03/22/24 1517   Periwound Temperature warm 03/22/24 1517   Periwound Skin Turgor soft 03/22/24 1517   Edges callused 03/22/24 1517   Length (cm) 1.1 03/22/24 1517   Width (cm) 1.4 03/22/24 1517   Depth (cm) 0.2 03/22/24 1517   Wound (cm^2) 1.54 cm^2 03/22/24 1517   Wound Volume (cm^3) 0.31 cm^3 03/22/24 1517   Wound healing % -170.18 03/22/24 1517   Drainage Characteristics/Odor green;serosanguineous 03/22/24 1517   Drainage Amount moderate 03/22/24 1517   Care, Wound debrided;other (see comments) 03/22/24 1517       Wound (used by [facility] only) 03/14/24 0918 Right 3 toe other (see comments) (Active)   Thickness/Stage full thickness 03/22/24 1517   Base red 03/22/24 1517   Periwound intact;swelling 03/22/24 1517   Periwound Temperature warm 03/22/24 1517   Periwound Skin Turgor soft 03/22/24 1517   Edges open 03/22/24 1517   Length (cm) 0 03/22/24 1517   Width (cm) 0 03/22/24 1517   Depth (cm) 0 03/22/24 1517   Wound (cm^2) 0 cm^2 03/22/24 1517   Wound Volume (cm^3) 0 cm^3 03/22/24 1517   Wound healing % 100 03/22/24 1517   Drainage Characteristics/Odor serosanguineous 03/14/24 0907   Drainage Amount none 03/22/24 1517   Care, Wound non-select wound debridement performed. 03/14/24 0907                 PROCEDURES: Mechanical debridement was performed with cleansing of the wound by the nursing staff      PATIENT INSTRUCTIONS      Further instructions from your care team         Judie Rojas   1960    A DME order was not completed because the patient is having dressing changes  done at [facility]. PLEASE BRING ANY SUPPLIES THAT WERE GIVEN TO YOU WITH YOU  TO YOUR NEXT VISIT.    PLAN:  Epifix 14mm applied 3/8/24 & 3/14/24 & 3/22/24  Continue to wear cast protector in shower  Continue to minimize walking/weight  "bearing  Keep blood sugars below 150 and A1C below 7  Whenever you sit, raise your ankle above your hips to promote wound healing  Reach out to [facility] to get new orthotics (order sent 09/2023): # [phone]    Wound Dressing Change: Right toe 3  -Cleanse with mild unscented soap and water (such as Cetaphil, Cerave or Dove)  -Apply a small piece of endoform to wound bed  -Apply a band-aid  -change daily or as needed for soilage.    Wound Dressing Change: Left Plantar Foot  - Prophase protocol  - Cleanse with mild unscented soap (such as Cetaphil, Cerave or Dove) and water  - Apply small amount of 0.25% ACETIC ACID on gauze, lay into wound bed, let sit for 90 seconds, remove gauze   - Rinse with saline  - Apply betadine to periwound  - Apply benzoine tincture to periwound  - Epifix 14mm applied to wound followed by Melgisorb, Mepitel and Sterile Strips  - Cover with Drawtex pad  - Apply Spandage size 7  - Apply Exudry and secure with TCC-Essity 1st layer or roll gauze and medipore tape  - Apply TCC-Essity  Change weekly at [facility]      Protein:  A diet high in protein is important for wound healing, we recommend getting up to  90 grams of protein per day.  Taking protein shakes or bars are a good way to get extra protein in your diet.      TCC-Essity:  The TCC-Essity Total Contact Cast that has been placed on your foot and leg by your  doctor has been proven to be an effective method of off-loading your foot. It is  important that you understand potential problems with the cast so they can be  avoided. Please follow the instructions below during your care.  1. Limit activity for first 24 hours after cast is applied  2. The outer boot MUST always be worn when walking.  3. Do not insert anything under the cast. Injury to your skin can be very dangerous.  4. Subsequent cast changes will take place from 1-2 times per week as instructed.  5. . If the cast is \"loose\" or \"rubbing\" or causing pain, please call us- it may " need to be  changed. Or if you develop a fever, chills, nausea, or vomiting the cast must be  removed to check the wound  6. Keep your cast dry. If the cast gets wet, it must be changed immediately. (Use a  protective cast bag when you are bathing.)  7. If the cast is removed in the hospital or ER the cast technician must be notified  there is only padding on the front over the shin, on the ankles and over the foot and toes.  8. You will need to wear a cast for 1-2 additional weeks after your wound has healed  or until your skin has returned to normal thickness.  9. If the Clinic is closed proceed to the nearest emergency room or urgent care.This is a non-traditional cast and must be removed in a different manner.  10. Call clinic with any problems with your cast. Our phone number is [phone].  After hours please phone your PCP or go to the nearest ER.       Main Provider: Alyssa Rosenthal PA-C March 22, 2024    Call us at [phone] if you have any questions about your wounds, if you have redness or swelling around your wound, have a fever of 101 degrees Fahrenheit or greater or if you have any other problems or concerns. We answer the phone Monday through Friday 8 am to 4 pm, please leave a message as we check the voicemail frequently throughout the day. If you have a concern over the weekend, please leave a message and we will return your call Monday. If the need is urgent, go to the ER or urgent care.    If you had a positive experience please indicate that on your patient satisfaction survey form that [facility] will be sending you.    It was a pleasure meeting with you today.  Thank you for allowing me and my team the privilege of caring for you today.  YOU are the reason we are here, and I truly hope we provided you with the excellent service you deserve.  Please let us know if there is anything else we can do for you so that we can be sure you are leaving completely satisfied with your care  experience.      If you have any billing related questions please call the [facility] Business office at [phone]. The clinic staff does not handle billing related matters.    If you are scheduled to have a follow up appointment, you will receive a reminder call the day before your visit. On the appointment day please arrive 15 minutes prior to your appointment time. If you are unable to keep that appointment, please call the clinic to cancel or reschedule. If you are more than 10 minutes late or greater for your scheduled appointment time, the clinic policy is that you may be asked to reschedule.

## 2024-03-29 ENCOUNTER — HOSPITAL ENCOUNTER (OUTPATIENT)
Dept: WOUND CARE | Facility: CLINIC | Age: 64
Discharge: HOME | End: 2024-03-29
Payer: COMMERCIAL

## 2024-03-29 VITALS
TEMPERATURE: 97.2 F | HEART RATE: 61 BPM | DIASTOLIC BLOOD PRESSURE: 69 MMHG | RESPIRATION RATE: 16 BRPM | SYSTOLIC BLOOD PRESSURE: 131 MMHG

## 2024-03-29 DIAGNOSIS — L97.522 DIABETIC ULCER OF OTHER PART OF LEFT FOOT ASSOCIATED WITH TYPE 2 DIABETES MELLITUS, WITH FAT LAYER EXPOSED (H): Primary | ICD-10-CM

## 2024-03-29 DIAGNOSIS — E11.621 DIABETIC ULCER OF OTHER PART OF LEFT FOOT ASSOCIATED WITH TYPE 2 DIABETES MELLITUS, WITH FAT LAYER EXPOSED (H): Primary | ICD-10-CM

## 2024-03-29 PROCEDURE — 29445 APPL RIGID TOT CNTC LEG CAST: CPT | Mod: LT,XU | Performed by: PHYSICIAN ASSISTANT

## 2024-03-29 PROCEDURE — 15275 SKIN SUB GRAFT FACE/NK/HF/G: CPT | Performed by: PHYSICIAN ASSISTANT

## 2024-03-29 NOTE — DISCHARGE INSTRUCTIONS
Judie Rojas   1960  FRANCOIS: Please see order for what dressings are needed at this time    A DME order was not completed because the patient is having dressing changes done at [facility]. PLEASE BRING ANY SUPPLIES THAT WERE GIVEN TO YOU WITH YOU TO YOUR NEXT VISIT.    PLAN:  Epifix 14mm applied 3/8/24; 3/14/24; 3/22/24; 3/29/24  Continue to wear cast protector in shower  Continue to minimize walking/weight bearing  Keep blood sugars below 150 and A1C below 7  Whenever you sit, raise your ankle above your hips to promote wound healing  Reach out to [facility] to get new orthotics (order sent 09/2023): # [phone]  Consider seeing Podiatry to discuss release of toe tendon to allow for straightening  [facility]  [address], [address], [place] [id number]  Phone: [phone]    Wound Dressing Change: Right toe 3 - HEALED  -Cleanse with mild unscented soap and water (such as Cetaphil, Cerave or Dove)  - Apply a ceramide based lotion (Cera-Ve, Vanicream, Cetaphil) to foot - avoiding the toes  - Make sure to dry UNDER and BETWEEN your toes  - Consider applying a dusting of foot powder to skin around toes to help manage moisture  Monitor for signs of new wounds or areas of concern    Wound Dressing Change: Left Plantar Foot  - Prophase protocol  - Cleanse with mild unscented soap (such as Cetaphil, Cerave or Dove) and water  - Apply small amount of 0.25% ACETIC ACID on gauze, lay into wound bed, let sit for 90 seconds, remove gauze   - Rinse with saline  - Apply betadine to periwound  - Apply benzoine tincture to periwound  - Epifix 14mm applied to wound followed by Melgisorb, and Sterile Strips  - Cover with Drawtex pad  - Apply Spandage size 7  - Apply Exudry and secure with TCC-Essity 1st layer or roll gauze and medipore tape  - Apply TCC-Essity  Change weekly at [facility]      Protein:  A diet high in protein is important for wound healing, we recommend getting up to 90 grams of  "protein per day.  Taking protein shakes or bars are a good way to get extra protein in your diet.      TCC-Essity:  The TCC-Essity Total Contact Cast that has been placed on your foot and leg by your  doctor has been proven to be an effective method of off-loading your foot. It is  important that you understand potential problems with the cast so they can be  avoided. Please follow the instructions below during your care.  1. Limit activity for first 24 hours after cast is applied  2. The outer boot MUST always be worn when walking.  3. Do not insert anything under the cast. Injury to your skin can be very dangerous.  4. Subsequent cast changes will take place from 1-2 times per week as instructed.  5. . If the cast is \"loose\" or \"rubbing\" or causing pain, please call us- it may need to be  changed. Or if you develop a fever, chills, nausea, or vomiting the cast must be  removed to check the wound  6. Keep your cast dry. If the cast gets wet, it must be changed immediately. (Use a  protective cast bag when you are bathing.)  7. If the cast is removed in the hospital or ER the cast technician must be notified  there is only padding on the front over the shin, on the ankles and over the foot and toes.  8. You will need to wear a cast for 1-2 additional weeks after your wound has healed  or until your skin has returned to normal thickness.  9. If the Clinic is closed proceed to the nearest emergency room or urgent care.This is a non-traditional cast and must be removed in a different manner.  10. Call clinic with any problems with your cast. Our phone number is [phone].  After hours please phone your PCP or go to the nearest ER.    FRANCOIS: Please see order for what dressings are needed at this time   Main Provider: Alyssa Rosenthal PA-C March 29, 2024    Call us at [phone] if you have any questions about your wounds, if you have redness or swelling around your wound, have a fever of 101 degrees Fahrenheit or " greater or if you have any other problems or concerns. We answer the phone Monday through Friday 8 am to 4 pm, please leave a message as we check the voicemail frequently throughout the day. If you have a concern over the weekend, please leave a message and we will return your call Monday. If the need is urgent, go to the ER or urgent care.    If you had a positive experience please indicate that on your patient satisfaction survey form that [facility] will be sending you.    It was a pleasure meeting with you today.  Thank you for allowing me and my team the privilege of caring for you today.  YOU are the reason we are here, and I truly hope we provided you with the excellent service you deserve.  Please let us know if there is anything else we can do for you so that we can be sure you are leaving completely satisfied with your care experience.      If you have any billing related questions please call the [facility] Business office at [phone]. The clinic staff does not handle billing related matters.    If you are scheduled to have a follow up appointment, you will receive a reminder call the day before your visit. On the appointment day please arrive 15 minutes prior to your appointment time. If you are unable to keep that appointment, please call the clinic to cancel or reschedule. If you are more than 10 minutes late or greater for your scheduled appointment time, the clinic policy is that you may be asked to reschedule.

## 2024-03-29 NOTE — PROGRESS NOTES
"[facility]    ASSESSMENT:   Diabetic ulcer of other part of left foot associated with type 2 diabetes mellitus, with fat layer exposed   Colonization with PO antibiotic resistant pseudomonas    PLAN/DISCUSSION:   4th Epifix application today, 14 mm disc, [id number] Exp 11/1/28  Acetic acid soak performed then gentamicin to wound and periwound, washed wound bed off with saline to avoid interference with graft, Iodine to periwound to dry, Epifix to wound bed followed by Melgisorb, steristrips, drawtex, spandage, exudry, essity cast    Interval Hx:   03/29: Returns to clinic. Wound measuring smaller, excellent granulation tissue.     HISTORY OF PRESENT ILLNESS:   Patient was seen at wound center for L foot wound. Previously followed by Dr. Soto. Per her note: \"He now follows up for his left foot, following second metatarsal resection on 10/19. Had an MRI that confirmed osteomyelitis and was scheduled for surgery quickly afterwards. Still takes augmentin long term per Id for chronic osteomyelitis.\"     2/1: TCC EZ started weekly.      2/20: TCC EZ cracked at ankle. Changed to Essity cast system and ActiCoat 7 as primary dressing. Green drainage so started on cipro and cultured - resistant to orals.     2/23: Wound improved and green drainage lessened. Started topical gent on wound and periwound. No issues with Essity cast.     2/29: Wound bed appears . Unable to do 3C patch given low platelets and DOAC. Continue Gentamicin ointment and Essity cast.       3/4: Stepped in water 3/1 and cast broke. Foot macerated with DTI of heel. Drawtex added to regimen.    3/8: Minimal green drainage, wound granular. EpiFix #1 applied.     3/14: Minimal green drainage, overall less drainage noted. Wound is granular with noted improvement of depth of the lateral edge of wound. EpiFix #2 applied, melgisorb, mepitel, spandage, exudry, TCC.     3/22: Decreasing in size. EpiFix #3 applied + " TCC.    VITALS: /69 (BP Location: Right arm, Patient Position: Sitting, Cuff Size: Adult Regular)   Pulse 61   Temp 97.2  F (36.2  C) (Temporal)   Resp 16      PHYSICAL EXAM:  GENERAL: Patient is alert and oriented and in no acute distress  INTEGUMENTARY:   Wound (used by OP [facility] only) 09/25/23 1505 Left plantar foot surgical (Active)   Thickness/Stage full thickness 03/29/24 1359   Base pink 03/29/24 1359   Periwound intact;macerated 03/29/24 1359   Periwound Temperature warm 03/29/24 1359   Periwound Skin Turgor soft 03/29/24 1359   Edges callused;open 03/29/24 1359   Length (cm) 1 03/29/24 1359   Width (cm) 1.1 03/29/24 1359   Depth (cm) 0.1 03/29/24 1359   Wound (cm^2) 1.1 cm^2 03/29/24 1359   Wound Volume (cm^3) 0.11 cm^3 03/29/24 1359   Wound healing % -92.98 03/29/24 1359   Drainage Characteristics/Odor serosanguineous;green 03/29/24 1359   Drainage Amount moderate 03/29/24 1359   Care, Wound debrided;other (see comments) 03/29/24 1359                 PROCEDURE: Vigorous mechanical debridement performed. EpiFix 14mm disc applied followed by TCC Essity system.     PATIENT INSTRUCTIONS      Further instructions from your care team         Judie Rojas   1960  FRANCOIS: Please see order for what dressings are needed at this time    A DME order was not completed because the patient is having dressing changes done at [facility]. PLEASE BRING ANY SUPPLIES THAT WERE GIVEN TO YOU WITH YOU TO YOUR NEXT VISIT.    PLAN:  Epifix 14mm applied 3/8/24; 3/14/24; 3/22/24; 3/29/24  Continue to wear cast protector in shower  Continue to minimize walking/weight bearing  Keep blood sugars below 150 and A1C below 7  Whenever you sit, raise your ankle above your hips to promote wound healing  Reach out to [facility] to get new orthotics (order sent 09/2023): # [phone]  Consider seeing Podiatry to discuss release of toe tendon to allow for straightening  [facility]  [address], [facility] [address] [facility]  [address], [place] [address]  Phone: [phone]    Wound Dressing Change: Right toe 3 - HEALED  -Cleanse with mild unscented soap and water (such as Cetaphil, Cerave or Dove)  - Apply a ceramide based lotion (Cera-Ve, Vanicream, Cetaphil) to foot - avoiding the toes  - Make sure to dry UNDER and BETWEEN your toes  - Consider applying a dusting of foot powder to skin around toes to help manage moisture  Monitor for signs of new wounds or areas of concern    Wound Dressing Change: Left Plantar Foot  - Prophase protocol  - Cleanse with mild unscented soap (such as Cetaphil, Cerave or Dove) and water  - Apply small amount of 0.25% ACETIC ACID on gauze, lay into wound bed, let sit for 90 seconds, remove gauze   - Rinse with saline  - Apply betadine to periwound  - Apply benzoine tincture to periwound  - Epifix 14mm applied to wound followed by Melgisorb, and Sterile Strips  - Cover with Drawtex pad  - Apply Spandage size 7  - Apply Exudry and secure with TCC-Essity 1st layer or roll gauze and medipore tape  - Apply TCC-Essity  Change weekly at [facility]      Protein:  A diet high in protein is important for wound healing, we recommend getting up to 90 grams of protein per day.  Taking protein shakes or bars are a good way to get extra protein in your diet.      TCC-Essity:  The TCC-Essity Total Contact Cast that has been placed on your foot and leg by your  doctor has been proven to be an effective method of off-loading your foot. It is  important that you understand potential problems with the cast so they can be  avoided. Please follow the instructions below during your care.  1. Limit activity for first 24 hours after cast is applied  2. The outer boot MUST always be worn when walking.  3. Do not insert anything under the cast. Injury to your skin can be very dangerous.  4. Subsequent cast changes will take place from 1-2 times per week as instructed.  5. . If the cast is \"loose\" or \"rubbing\" or causing pain, please call us- " it may need to be  changed. Or if you develop a fever, chills, nausea, or vomiting the cast must be  removed to check the wound  6. Keep your cast dry. If the cast gets wet, it must be changed immediately. (Use a  protective cast bag when you are bathing.)  7. If the cast is removed in the hospital or ER the cast technician must be notified  there is only padding on the front over the shin, on the ankles and over the foot and toes.  8. You will need to wear a cast for 1-2 additional weeks after your wound has healed  or until your skin has returned to normal thickness.  9. If the Clinic is closed proceed to the nearest emergency room or urgent care.This is a non-traditional cast and must be removed in a different manner.  10. Call clinic with any problems with your cast. Our phone number is [phone].  After hours please phone your PCP or go to the nearest ER.    FRANCOIS: Please see order for what dressings are needed at this time   Main Provider: Alyssa Rosenthal PA-C March 29, 2024    Call us at [phone] if you have any questions about your wounds, if you have redness or swelling around your wound, have a fever of 101 degrees Fahrenheit or greater or if you have any other problems or concerns. We answer the phone Monday through Friday 8 am to 4 pm, please leave a message as we check the voicemail frequently throughout the day. If you have a concern over the weekend, please leave a message and we will return your call Monday. If the need is urgent, go to the ER or urgent care.    If you had a positive experience please indicate that on your patient satisfaction survey form that [facility] will be sending you.    It was a pleasure meeting with you today.  Thank you for allowing me and my team the privilege of caring for you today.  YOU are the reason we are here, and I truly hope we provided you with the excellent service you deserve.  Please let us know if there is anything else we can do for you so  that we can be sure you are leaving completely satisfied with your care experience.      If you have any billing related questions please call the [facility] Business office at [phone]. The clinic staff does not handle billing related matters.    If you are scheduled to have a follow up appointment, you will receive a reminder call the day before your visit. On the appointment day please arrive 15 minutes prior to your appointment time. If you are unable to keep that appointment, please call the clinic to cancel or reschedule. If you are more than 10 minutes late or greater for your scheduled appointment time, the clinic policy is that you may be asked to reschedule.

## 2024-04-04 ENCOUNTER — TELEPHONE (OUTPATIENT)
Dept: FAMILY MEDICINE | Facility: CLINIC | Age: 64
End: 2024-04-04

## 2024-04-04 ENCOUNTER — HOSPITAL ENCOUNTER (OUTPATIENT)
Dept: WOUND CARE | Facility: CLINIC | Age: 64
Discharge: HOME | End: 2024-04-04
Payer: COMMERCIAL

## 2024-04-04 VITALS — TEMPERATURE: 98.6 F

## 2024-04-04 DIAGNOSIS — L97.522 DIABETIC ULCER OF OTHER PART OF LEFT FOOT ASSOCIATED WITH TYPE 2 DIABETES MELLITUS, WITH FAT LAYER EXPOSED (H): Primary | ICD-10-CM

## 2024-04-04 DIAGNOSIS — E11.621 DIABETIC ULCER OF OTHER PART OF LEFT FOOT ASSOCIATED WITH TYPE 2 DIABETES MELLITUS, WITH FAT LAYER EXPOSED (H): Primary | ICD-10-CM

## 2024-04-04 PROCEDURE — 29445 APPL RIGID TOT CNTC LEG CAST: CPT

## 2024-04-04 PROCEDURE — 15004 WOUND PREP F/N/HF/G: CPT

## 2024-04-04 PROCEDURE — 15275 SKIN SUB GRAFT FACE/NK/HF/G: CPT

## 2024-04-04 PROCEDURE — 99214 OFFICE O/P EST MOD 30 MIN: CPT | Mod: 25

## 2024-04-04 NOTE — DISCHARGE INSTRUCTIONS
04/04/2024   Judie Rojas   1960    A DME order was not completed because the patient is having dressing changes done at [facility]. PLEASE BRING ANY SUPPLIES THAT WERE GIVEN TO YOU WITH YOU TO YOUR NEXT VISIT.     PLAN:  Epifix 14mm applied 3/8/24; 3/14/24; 3/22/24; 3/29/24; 4/4/24  Continue to wear cast protector in shower  Continue to minimize walking/weight bearing  Keep blood sugars below 150 and A1C below 7  Whenever you sit, raise your ankle above your hips to promote wound healing  Reach out to [facility] to get new orthotics (order sent 09/2023): # [phone]  Consider seeing Podiatry to discuss release of toe tendon to allow for straightening  [facility]  [address], [address], [place] [id number]  Phone: [phone]     Wound Dressing Change: Right toe 3 - HEALED  -Cleanse with mild unscented soap and water (such as Cetaphil, Cerave or Dove)  - Apply a ceramide based lotion (Cera-Ve, Vanicream, Cetaphil) to foot - avoiding the toes  - Make sure to dry UNDER and BETWEEN your toes  - Consider applying a dusting of foot powder to skin around toes to help manage moisture  Monitor for signs of new wounds or areas of concern     Wound Dressing Change: Left Plantar Foot  - Prophase protocol  - Cleanse with mild unscented soap (such as Cetaphil, Cerave or Dove) and water  - Apply small amount of 0.25% ACETIC ACID on gauze, lay into wound bed, let sit for 90 seconds, remove gauze   - Rinse with saline  - Apply betadine to periwound  - Apply benzoine tincture to periwound  - Epifix 14mm applied to wound followed by Melgisorb, and Sterile Strips  - Cover with Drawtex pad  - Apply Spandage size 7  - Apply Exudry and secure with TCC-Essity 1st layer or roll gauze and medipore tape  - Apply TCC-Essity  Change weekly at [facility]      Protein:  A diet high in protein is important for wound healing, we recommend getting up to 90 grams of protein per day.  Taking protein shakes or bars  "are a good way to get extra protein in your diet.      TCC-Essity:  The TCC-Essity Total Contact Cast that has been placed on your foot and leg by your  doctor has been proven to be an effective method of off-loading your foot. It is  important that you understand potential problems with the cast so they can be  avoided. Please follow the instructions below during your care.  1. Limit activity for first 24 hours after cast is applied  2. The outer boot MUST always be worn when walking.  3. Do not insert anything under the cast. Injury to your skin can be very dangerous.  4. Subsequent cast changes will take place from 1-2 times per week as instructed.  5. . If the cast is \"loose\" or \"rubbing\" or causing pain, please call us- it may need to be  changed. Or if you develop a fever, chills, nausea, or vomiting the cast must be  removed to check the wound  6. Keep your cast dry. If the cast gets wet, it must be changed immediately. (Use a  protective cast bag when you are bathing.)  7. If the cast is removed in the hospital or ER the cast technician must be notified  there is only padding on the front over the shin, on the ankles and over the foot and toes.  8. You will need to wear a cast for 1-2 additional weeks after your wound has healed  or until your skin has returned to normal thickness.  9. If the Clinic is closed proceed to the nearest emergency room or urgent care.This is a non-traditional cast and must be removed in a different manner.  10. Call clinic with any problems with your cast. Our phone number is [phone].  After hours please phone your PCP or go to the nearest ER.       Main Provider: Colette Fisher NP April 4, 2024    Call us at [phone] if you have any questions about your wounds, if you have redness or swelling around your wound, have a fever of 101 degrees Fahrenheit or greater or if you have any other problems or concerns. We answer the phone Monday through Friday 8 am to 4 pm, please " leave a message as we check the voicemail frequently throughout the day. If you have a concern over the weekend, please leave a message and we will return your call Monday. If the need is urgent, go to the ER or urgent care.    If you had a positive experience please indicate that on your patient satisfaction survey form that [facility] will be sending you.    It was a pleasure meeting with you today.  Thank you for allowing me and my team the privilege of caring for you today.  YOU are the reason we are here, and I truly hope we provided you with the excellent service you deserve.  Please let us know if there is anything else we can do for you so that we can be sure you are leaving completely satisfied with your care experience.      If you have any billing related questions please call the [facility] Business office at [phone]. The clinic staff does not handle billing related matters.    If you are scheduled to have a follow up appointment, you will receive a reminder call the day before your visit. On the appointment day please arrive 15 minutes prior to your appointment time. If you are unable to keep that appointment, please call the clinic to cancel or reschedule. If you are more than 10 minutes late or greater for your scheduled appointment time, the clinic policy is that you may be asked to reschedule.

## 2024-04-04 NOTE — TELEPHONE ENCOUNTER
Summary:    Patient is due/failing the following:   BP CHECK    Reviewed:    [] CARE EVERYWHERE  [] LAST OV NOTE   [] FYI TAB  [] MYCHART ACTIVE?  [] LAST PANEL ENCOUNTER  [] FUTURE APPTS  [] IMMUNIZATIONS  [] Media Tab            Action needed:   Patient needs nurse only appointment.    Type of outreach:    Sent [employer] message.       Chart updated                                                                        Shania Warren/KOBI  [place]---[facility]

## 2024-04-04 NOTE — PROGRESS NOTES
"[facility]    ASSESSMENT:   Diabetic ulcer of other part of left foot associated with type 2 diabetes mellitus, with fat layer exposed-improving  Colonizaiton with PO antibiotic resistant pseudomonas    PLAN/DISCUSSION:   5th Epifix application today, 14 mm disc, [id number] exp 11/01/2028  Acetic acid soak performed then gentamicin to wound and periwound, washed wound bed off with saline to avoid interference with graft, Iodine to periwound to dry, Epifix to wound bed followed by Melgisorb, steristrips, drawtex, spandage, exudry, essity cast.    Interval Hx:   4/4: Returns to clinic, wound measuring smaller with new skin, excellent granulation tissue.      HISTORY OF PRESENT ILLNESS:   Patient was seen at wound center for L foot wound. Previously followed by Dr. Soto. Per her note: \"He now follows up for his left foot, following second metatarsal resection on 10/19. Had an MRI that confirmed osteomyelitis and was scheduled for surgery quickly afterwards. Still takes augmentin long term per Id for chronic osteomyelitis.\"     2/1: TCC EZ started weekly.      2/20: TCC EZ cracked at ankle. Changed to Essity cast system and ActiCoat 7 as primary dressing. Green drainage so started on cipro and cultured - resistant to orals.     2/23: Wound improved and green drainage lessened. Started topical gent on wound and periwound. No issues with Essity cast.     2/29: Wound bed appears . Unable to do 3C patch given low platelets and DOAC. Continue Gentamicin ointment and Essity cast.       3/4: Stepped in water 3/1 and cast broke. Foot macerated with DTI of heel. Drawtex added to regimen.     3/8: Minimal green drainage, wound granular. EpiFix #1 applied.      3/14: Minimal green drainage, overall less drainage noted. Wound is granular with noted improvement of depth of the lateral edge of wound. EpiFix #2 applied, melgisorb, mepitel, spandage, exudry, TCC.      3/22: Decreasing in size. " EpiFix #3 applied + TCC.    03/29: Returns to clinic. Wound measuring smaller, excellent granulation tissue. Epifix #4 applied    Patient Active Problem List   Diagnosis    Calculus of kidney    Chronic left shoulder pain    Type 2 diabetes mellitus with peripheral neuropathy (H)    Hyperlipidemia LDL goal <70    Essential hypertension    Right bundle branch block    GILA (obstructive sleep apnea)    Diabetic ulcer of lower extremity (H)    PVD (peripheral vascular disease) (H24)    Scoliosis of thoracic spine, unspecified scoliosis type    Other sequelae following unspecified cerebrovascular disease    Cervical pain    Bilateral thoracic back pain    Osteomyelitis of great toe of right foot (H)    Acute deep vein thrombosis (DVT) of tibial vein of right lower extremity (H)    Abnormal CT of liver    Benign neoplasm of transverse colon    Duodenitis    Hemorrhoids    Polyp of colon    Acute osteomyelitis of right foot (H)    Essential tremor    Nonalcoholic steatohepatitis    Otalgia of left ear    Shoulder pain    Liver cirrhosis secondary to SNYDER (H)    Gastro-esophageal reflux disease with esophagitis    Chronic osteomyelitis of right foot (H)    Chronic foot ulcer with fat layer exposed, left (H)    Diabetic ulcer of left foot with fat layer exposed (H)    Class 2 severe obesity due to excess calories with serious comorbidity in adult (H)       Current Outpatient Medications   Medication Sig Dispense Refill    amLODIPine (NORVASC) 5 MG tablet Take 1 tablet (5 mg) by mouth 2 times daily 180 tablet 3    amoxicillin-clavulanate (AUGMENTIN) 875-125 MG tablet Take 1 tablet by mouth daily      atorvastatin (LIPITOR) 40 MG tablet Take 1 tablet (40 mg) by mouth daily Take one tablet daily SEE PROVIDER FOR NEXT REFILL 90 tablet 4    blood glucose (NO BRAND SPECIFIED) lancets standard Use to test blood sugar 3 times daily or as directed. 300 each 3    calcium carbonate (OS-NARA) 500 MG tablet Take 1 tablet by mouth 2 times  daily      Cholecalciferol (VITAMIN D3) 25 MCG (1000 UT) CAPS Take 1,000 Units by mouth daily       Co-Enzyme Q10 100 MG CAPS Take 100 mg by mouth daily       Continuous Blood Gluc Sensor (FREESTYLE LUCERO 14 DAY SENSOR) [facility] USE ONE EVERY 14 DAYS 6 each 3    ELIQUIS ANTICOAGULANT 5 MG tablet Take 1 tablet (5 mg) by mouth 2 times daily 180 tablet 4    ferrous sulfate (FEROSUL) 325 (65 Fe) MG tablet Take 325 mg by mouth daily (with breakfast)      gabapentin (NEURONTIN) 100 MG capsule Take 400 mg by mouth 3 times daily      gentamicin (GARAMYCIN) 0.1 % external ointment Apply 5g topically to wound with bandage changes. 30 g 2    hydrochlorothiazide (MICROZIDE) 12.5 MG capsule Take 1 capsule (12.5 mg) by mouth every morning 90 capsule 4    Insulin Aspart FlexPen 100 UNIT/ML SOPN ADMINISTER 70 UNITS UNDER THE SKIN DAILY 30 mL 1    insulin degludec (TRESIBA FLEXTOUCH) 100 UNIT/ML pen Administer 50 - 55 units under skin daily 45 mL 3    insulin pen needle (B-D U/F) 31G X 5 MM miscellaneous USE TO INJECT LANTUS TWICE DAILY AND NOVOLOG THREE TIMES DAILY AS DIRECTED 500 each 3    ketoconazole (NIZORAL) 2 % external shampoo Apply topically daily as needed for itching or irritation See MD after 6 weeks 120 mL 1    lisinopril (ZESTRIL) 40 MG tablet Take 1 tablet (40 mg) by mouth daily 90 tablet 4    MAGNESIUM GLYCINATE PLUS PO Take 400 mg by mouth 2 times daily      metFORMIN (GLUCOPHAGE XR) 500 MG 24 hr tablet Take 2 tablets (1,000 mg) by mouth 2 times daily 360 tablet 0    Multiple Vitamins-Minerals (MULTIVITAMIN PO) Take 1 tablet by mouth daily       mupirocin (BACTROBAN) 2 % external ointment Apply a small amount to nose tid 22 g 3    mupirocin (BACTROBAN) 2 % external ointment Apply a small amount to affected nares 2 times a day for one month. 30 g 1    Omega-3 Fatty Acids (OMEGA-3 FISH OIL PO) Take 1 g by mouth 2 times daily (with meals)       omeprazole (PRILOSEC) 40 MG DR capsule Take 40 mg by mouth daily       propranolol (INDERAL) 20 MG tablet Take 20 mg by mouth      Semaglutide, 2 MG/DOSE, (OZEMPIC) 8 MG/3ML pen Inject 2 mg Subcutaneous every 7 days 9 mL 3    tadalafil (CIALIS) 5 MG tablet TAKE 1 TABLET BY MOUTH EVERY DAY AS NEEDED one hour before intercourse 30 tablet 1     No current facility-administered medications for this encounter.       VITALS: Temp 98.6  F (37  C) (Temporal)      PHYSICAL EXAM:  GENERAL: Patient is alert and oriented and in no acute distress  INTEGUMENTARY:   Wound (used by OP [facility] only) 09/25/23 1505 Left plantar foot surgical (Active)   Thickness/Stage full thickness 04/04/24 1005   Base pink;yellow 04/04/24 1005   Periwound intact;macerated 04/04/24 1005   Periwound Temperature warm 04/04/24 1005   Periwound Skin Turgor soft 04/04/24 1005   Edges callused;open 04/04/24 1005   Length (cm) 0.9 04/04/24 1005   Width (cm) 1 04/04/24 1005   Depth (cm) 0.2 04/04/24 1005   Wound (cm^2) 0.9 cm^2 04/04/24 1005   Wound Volume (cm^3) 0.18 cm^3 04/04/24 1005   Wound healing % -57.89 04/04/24 1005   Drainage Characteristics/Odor serosanguineous;green 04/04/24 1005   Drainage Amount moderate 04/04/24 1005   Care, Wound debrided;other (see comments) 03/29/24 1359             PROCEDURES: Mechanical debridement was performed with cleansing of the wound by the nursing staff    PATIENT INSTRUCTIONS      Further instructions from your care team         04/04/2024   Judie Rojas   1960    A DME order was not completed because the patient is having dressing changes done at [facility]. PLEASE BRING ANY SUPPLIES THAT WERE GIVEN TO YOU WITH YOU TO YOUR NEXT VISIT.     PLAN:  Epifix 14mm applied 3/8/24; 3/14/24; 3/22/24; 3/29/24; 4/4/24  Continue to wear cast protector in shower  Continue to minimize walking/weight bearing  Keep blood sugars below 150 and A1C below 7  Whenever you sit, raise your ankle above your hips to promote wound healing  Re[facility] out to [facility] to get new orthotics (order sent 09/2023): ph#  [phone]  Consider seeing Podiatry to discuss release of toe tendon to allow for straightening  [facility]  [address], [address], [place] [address]  Phone: [phone]     Wound Dressing Change: Right toe 3 - HEALED  -Cleanse with mild unscented soap and water (such as Cetaphil, Cerave or Dove)  - Apply a ceramide based lotion (Cera-Ve, Vanicream, Cetaphil) to foot - avoiding the toes  - Make sure to dry UNDER and BETWEEN your toes  - Consider applying a dusting of foot powder to skin around toes to help manage moisture  Monitor for signs of new wounds or areas of concern     Wound Dressing Change: Left Plantar Foot  - Prophase protocol  - Cleanse with mild unscented soap (such as Cetaphil, Cerave or Dove) and water  - Apply small amount of 0.25% ACETIC ACID on gauze, lay into wound bed, let sit for 90 seconds, remove gauze   - Rinse with saline  - Apply betadine to periwound  - Apply benzoine tincture to periwound  - Epifix 14mm applied to wound followed by Melgisorb, and Sterile Strips  - Cover with Drawtex pad  - Apply Spandage size 7  - Apply Exudry and secure with TCC-Essity 1st layer or roll gauze and medipore tape  - Apply TCC-Essity  Change weekly at [facility]      Protein:  A diet high in protein is important for wound healing, we recommend getting up to 90 grams of protein per day.  Taking protein shakes or bars are a good way to get extra protein in your diet.      TCC-Essity:  The TCC-Essity Total Contact Cast that has been placed on your foot and leg by your  doctor has been proven to be an effective method of off-loading your foot. It is  important that you understand potential problems with the cast so they can be  avoided. Please follow the instructions below during your care.  1. Limit activity for first 24 hours after cast is applied  2. The outer boot MUST always be worn when walking.  3. Do not insert anything under the cast. Injury to your skin can be  "very dangerous.  4. Subsequent cast changes will take place from 1-2 times per week as instructed.  5. . If the cast is \"loose\" or \"rubbing\" or causing pain, please call us- it may need to be  changed. Or if you develop a fever, chills, nausea, or vomiting the cast must be  removed to check the wound  6. Keep your cast dry. If the cast gets wet, it must be changed immediately. (Use a  protective cast bag when you are bathing.)  7. If the cast is removed in the hospital or ER the cast technician must be notified  there is only padding on the front over the shin, on the ankles and over the foot and toes.  8. You will need to wear a cast for 1-2 additional weeks after your wound has healed  or until your skin has returned to normal thickness.  9. If the Clinic is closed proceed to the nearest emergency room or urgent care.This is a non-traditional cast and must be removed in a different manner.  10. Call clinic with any problems with your cast. Our phone number is [phone].  After hours please phone your PCP or go to the nearest ER.       Main Provider: Colette Fisher NP April 4, 2024    Call us at [phone] if you have any questions about your wounds, if you have redness or swelling around your wound, have a fever of 101 degrees Fahrenheit or greater or if you have any other problems or concerns. We answer the phone Monday through Friday 8 am to 4 pm, please leave a message as we check the voicemail frequently throughout the day. If you have a concern over the weekend, please leave a message and we will return your call Monday. If the need is urgent, go to the ER or urgent care.    If you had a positive experience please indicate that on your patient satisfaction survey form that [facility] will be sending you.    It was a pleasure meeting with you today.  Thank you for allowing me and my team the privilege of caring for you today.  YOU are the reason we are here, and I truly hope we provided you with the " excellent service you deserve.  Please let us know if there is anything else we can do for you so that we can be sure you are leaving completely satisfied with your care experience.      If you have any billing related questions please call the [facility] Business office at [phone]. The clinic staff does not handle billing related matters.    If you are scheduled to have a follow up appointment, you will receive a reminder call the day before your visit. On the appointment day please arrive 15 minutes prior to your appointment time. If you are unable to keep that appointment, please call the clinic to cancel or reschedule. If you are more than 10 minutes late or greater for your scheduled appointment time, the clinic policy is that you may be asked to reschedule.         Electronically signed by Colette Fisher CNP on April 4, 2024

## 2024-04-12 ENCOUNTER — TELEPHONE (OUTPATIENT)
Dept: PODIATRY | Facility: CLINIC | Age: 64
End: 2024-04-12

## 2024-04-12 ENCOUNTER — HOSPITAL ENCOUNTER (OUTPATIENT)
Dept: WOUND CARE | Facility: CLINIC | Age: 64
Discharge: HOME | End: 2024-04-12
Payer: COMMERCIAL

## 2024-04-12 VITALS — SYSTOLIC BLOOD PRESSURE: 155 MMHG | TEMPERATURE: 97.5 F | DIASTOLIC BLOOD PRESSURE: 75 MMHG | HEART RATE: 55 BPM

## 2024-04-12 DIAGNOSIS — E13.621: ICD-10-CM

## 2024-04-12 DIAGNOSIS — E11.621 DIABETIC ULCER OF OTHER PART OF LEFT FOOT ASSOCIATED WITH TYPE 2 DIABETES MELLITUS, WITH FAT LAYER EXPOSED (H): Primary | ICD-10-CM

## 2024-04-12 DIAGNOSIS — L97.522 DIABETIC ULCER OF OTHER PART OF LEFT FOOT ASSOCIATED WITH TYPE 2 DIABETES MELLITUS, WITH FAT LAYER EXPOSED (H): Primary | ICD-10-CM

## 2024-04-12 DIAGNOSIS — L97.522: ICD-10-CM

## 2024-04-12 PROCEDURE — 99214 OFFICE O/P EST MOD 30 MIN: CPT | Mod: 25

## 2024-04-12 PROCEDURE — 17250 CHEM CAUT OF GRANLTJ TISSUE: CPT

## 2024-04-12 NOTE — PROGRESS NOTES
"[facility]    ASSESSMENT:   Diabetic ulcer of other part of left foot associated with type 2 diabetes mellitus, with fat layer exposed-unchanged  Colonization with PO antibiotic resistant pseudomonas    PLAN/DISCUSSION:   Has started working full time at the [place] [employer], on his feet/walking 40+ hours a week. Discussed that this is impeding wound healing and he should be offloading as much as possible.   Given stagnation in wound improvement will refer back to Dr. Manriquez for further guidance.  6th Epifix application today, 14 mm disk [id number] Expires 11/01/2028  Increase in maceration noted around the wound, wound continues to have less depth.   Acetic acid soak performed then gentamicin to wound and periwound, washed wound bed off with saline to avoid interference with graft, Iodine to periwound to dry, Epifix to wound bed followed by Melgisorb, steristrips, drawtex, spandage, TCC EZ reapplied  Dietary recommendations discussed, see AVS       HISTORY OF PRESENT ILLNESS:   Patient was seen at wound center for L foot wound. Previously followed by Dr. Soto. Per her note: \"He now follows up for his left foot, following second metatarsal resection on 10/19. Had an MRI that confirmed osteomyelitis and was scheduled for surgery quickly afterwards. Still takes augmentin long term per Id for chronic osteomyelitis.\"     2/1: TCC EZ started weekly.      2/20: TCC EZ cracked at ankle. Changed to Essity cast system and ActiCoat 7 as primary dressing. Green drainage so started on cipro and cultured - resistant to orals.     2/23: Wound improved and green drainage lessened. Started topical gent on wound and periwound. No issues with Essity cast.     2/29: Wound bed appears . Unable to do 3C patch given low platelets and DOAC. Continue Gentamicin ointment and Essity cast.       3/4: Stepped in water 3/1 and cast broke. Foot macerated with DTI of heel. Drawtex added to regimen.     3/8: Minimal " green drainage, wound granular. EpiFix #1 applied.      3/14: Minimal green drainage, overall less drainage noted. Wound is granular with noted improvement of depth of the lateral edge of wound. EpiFix #2 applied, melgisorb, mepitel, spandage, exudry, TCC.      3/22: Decreasing in size. EpiFix #3 applied + TCC.     03/29: Returns to clinic. Wound measuring smaller, excellent granulation tissue. Epifix #4 applied    4/4: Returns to clinic, wound measuring smaller with new skin, excellent granulation tissue.  Epifix #5 applied      Patient Active Problem List   Diagnosis    Calculus of kidney    Chronic left shoulder pain    Type 2 diabetes mellitus with peripheral neuropathy (H)    Hyperlipidemia LDL goal <70    Essential hypertension    Right bundle branch block    GILA (obstructive sleep apnea)    Diabetic ulcer of lower extremity (H)    PVD (peripheral vascular disease) (H24)    Scoliosis of thoracic spine, unspecified scoliosis type    Other sequelae following unspecified cerebrovascular disease    Cervical pain    Bilateral thoracic back pain    Osteomyelitis of great toe of right foot (H)    Acute deep vein thrombosis (DVT) of tibial vein of right lower extremity (H)    Abnormal CT of liver    Benign neoplasm of transverse colon    Duodenitis    Hemorrhoids    Polyp of colon    Acute osteomyelitis of right foot (H)    Essential tremor    Nonalcoholic steatohepatitis    Otalgia of left ear    Shoulder pain    Liver cirrhosis secondary to SNYDER (H)    Gastro-esophageal reflux disease with esophagitis    Chronic osteomyelitis of right foot (H)    Chronic foot ulcer with fat layer exposed, left (H)    Diabetic ulcer of left foot with fat layer exposed (H)    Class 2 severe obesity due to excess calories with serious comorbidity in adult (H)         Current Outpatient Medications   Medication Sig Dispense Refill    amLODIPine (NORVASC) 5 MG tablet Take 1 tablet (5 mg) by mouth 2 times daily 180 tablet 3     amoxicillin-clavulanate (AUGMENTIN) 875-125 MG tablet Take 1 tablet by mouth daily      atorvastatin (LIPITOR) 40 MG tablet Take 1 tablet (40 mg) by mouth daily Take one tablet daily SEE PROVIDER FOR NEXT REFILL 90 tablet 4    blood glucose (NO BRAND SPECIFIED) lancets standard Use to test blood sugar 3 times daily or as directed. 300 each 3    calcium carbonate (OS-NARA) 500 MG tablet Take 1 tablet by mouth 2 times daily      Cholecalciferol (VITAMIN D3) 25 MCG (1000 UT) CAPS Take 1,000 Units by mouth daily       Co-Enzyme Q10 100 MG CAPS Take 100 mg by mouth daily       Continuous Blood Gluc Sensor (FREESTYLE LUCERO 14 DAY SENSOR) [facility] USE ONE EVERY 14 DAYS 6 each 3    ELIQUIS ANTICOAGULANT 5 MG tablet Take 1 tablet (5 mg) by mouth 2 times daily 180 tablet 4    ferrous sulfate (FEROSUL) 325 (65 Fe) MG tablet Take 325 mg by mouth daily (with breakfast)      gabapentin (NEURONTIN) 100 MG capsule Take 400 mg by mouth 3 times daily      gentamicin (GARAMYCIN) 0.1 % external ointment Apply 5g topically to wound with bandage changes. 30 g 2    hydrochlorothiazide (MICROZIDE) 12.5 MG capsule Take 1 capsule (12.5 mg) by mouth every morning 90 capsule 4    Insulin Aspart FlexPen 100 UNIT/ML SOPN ADMINISTER 70 UNITS UNDER THE SKIN DAILY 30 mL 1    insulin degludec (TRESIBA FLEXTOUCH) 100 UNIT/ML pen Administer 50 - 55 units under skin daily 45 mL 3    insulin pen needle (B-D U/F) 31G X 5 MM miscellaneous USE TO INJECT LANTUS TWICE DAILY AND NOVOLOG THREE TIMES DAILY AS DIRECTED 500 each 3    ketoconazole (NIZORAL) 2 % external shampoo Apply topically daily as needed for itching or irritation See MD after 6 weeks 120 mL 1    lisinopril (ZESTRIL) 40 MG tablet Take 1 tablet (40 mg) by mouth daily 90 tablet 4    MAGNESIUM GLYCINATE PLUS PO Take 400 mg by mouth 2 times daily      metFORMIN (GLUCOPHAGE XR) 500 MG 24 hr tablet Take 2 tablets (1,000 mg) by mouth 2 times daily 360 tablet 0    Multiple Vitamins-Minerals (MULTIVITAMIN  PO) Take 1 tablet by mouth daily       mupirocin (BACTROBAN) 2 % external ointment Apply a small amount to affected nares 2 times a day for one month. 30 g 1    Omega-3 Fatty Acids (OMEGA-3 FISH OIL PO) Take 1 g by mouth 2 times daily (with meals)       omeprazole (PRILOSEC) 40 MG DR capsule Take 40 mg by mouth daily      propranolol (INDERAL) 20 MG tablet Take 20 mg by mouth      Semaglutide, 2 MG/DOSE, (OZEMPIC) 8 MG/3ML pen Inject 2 mg Subcutaneous every 7 days 9 mL 3    tadalafil (CIALIS) 5 MG tablet TAKE 1 TABLET BY MOUTH EVERY DAY AS NEEDED one hour before intercourse 30 tablet 1     No current facility-administered medications for this encounter.       VITALS: BP (!) 155/75 (BP Location: Right arm, Patient Position: Chair, Cuff Size: Adult Regular)   Pulse 55   Temp 97.5  F (36.4  C) (Temporal)      PHYSICAL EXAM:  GENERAL: Patient is alert and oriented and in no acute distress  INTEGUMENTARY:   Wound (used by OP [facility] only) 09/25/23 1505 Left plantar foot surgical (Active)   Thickness/Stage full thickness 04/12/24 1029   Base pink;yellow 04/12/24 1029   Periwound intact;macerated 04/12/24 1029   Periwound Temperature warm 04/12/24 1029   Periwound Skin Turgor soft 04/12/24 1029   Edges callused;open 04/12/24 1029   Length (cm) 1 04/12/24 1029   Width (cm) 1.5 04/12/24 1029   Depth (cm) 0.1 04/12/24 1029   Wound (cm^2) 1.5 cm^2 04/12/24 1029   Wound Volume (cm^3) 0.15 cm^3 04/12/24 1029   Wound healing % -163.16 04/12/24 1029   Drainage Characteristics/Odor serosanguineous;green 04/12/24 1029   Drainage Amount moderate 04/12/24 1029   Care, Wound debrided;other (see comments);chemical cautery applied 04/12/24 1029               PROCEDURES: Silver nitrate applied.     PATIENT INSTRUCTIONS      Further instructions from your care team         04/12/2024   Judie Rojas   1960    A DME order was not completed because the patient is having dressing changes done at [facility]. PLEASE BRING ANY SUPPLIES THAT WERE GIVEN  "TO YOU WITH YOU TO YOUR NEXT VISIT.     PLAN: 04/12/24  -REFERRAL BACK TO DR [facility] TO GET REEVALUATION IN THE NEXT 1-2 WEEKS IF POSSIBLE  -WE WILL DETERMINE IN YOUR NEXT APPOINTMENT WHETHER TO REAPPLY EPIFIX  Epifix 14mm applied 3/8/24; 3/14/24; 3/22/24; 3/29/24; 4/4/24, 4/12/24  Continue to wear cast protector in shower  Continue to minimize walking/weight bearing; If the cast is \"loose\" or \"rubbing\" or causing pain, please call us- it may need to be  changed.  Keep blood sugars below 150 and A1C below 7  Whenever you sit, raise your ankle above your hips to promote wound healing  Reach out to [facility] to get new orthotics (order sent 09/2023): # [phone]  Consider seeing Podiatry to discuss release of toe tendon to allow for straightening  [facility]  [address], [address], [place] [id number]  Phone: [phone]     Wound Dressing Change: Right toe 3 - HEALED  -Cleanse with mild unscented soap and water (such as Cetaphil, Cerave or Dove)  - Apply a ceramide based lotion (Cera-Ve, Vanicream, Cetaphil) to foot - avoiding the toes  - Make sure to dry UNDER and BETWEEN your toes  - Consider applying a dusting of foot powder to skin around toes to help manage moisture  Monitor for signs of new wounds or areas of concern     Wound Dressing Change: Left Plantar Foot  - Prophase protocol  - Apply small amount of VASHE on gauze, lay into wound bed, remove gauze   - SILVER NITRATE APPLIED  - Epifix 14mm applied to wound followed by MEPITEL and Sterile Strips  - MELGISORB 1/2  - Cover with Drawtex pad  - Apply Spandage size 7  - Apply TCC-EZ SIZE 3  Change weekly at [facility]      Protein:  A diet high in protein is important for wound healing, we recommend getting up to 90 grams of protein per day.  Taking protein shakes or bars are a good way to get extra protein in your diet.    Keep leg dry with use of a cast protector (available at [facility] or [facility])     TCC-EZ " "CAST:  The TCC-EZ Total Contact Cast that has been placed on your foot and leg by your doctor has been proven to be an effective method of off-loading your foot. It is important that you understand potential problems with the cast so they can be avoided. Please follow the instructions below during your care.  1. Limit activity for first 24 hours after cast is applied  2. The outer boot MUST always be worn when walking.  3. Do not insert anything under the cast. Injury to your skin can be very dangerous.  4. Subsequent cast changes will take place from 1-2 times per week as instructed.  5. . If the cast is \"loose\" or \"rubbing\" or causing pain, please call us- it may need to be changed. Or if you develop a fever, chills, nausea, or vomiting the cast must be removed to check the wound  6. Keep your cast dry. If the cast gets wet, it must be changed immediately. (Use a protective cast bag when you are bathing.)  7. If the cast is removed in the hospital or ER the cast technician must be notified there is only padding on the front over the shin, on the ankles and over the foot and toes.  8. You will need to wear a cast for 1-2 additional weeks after your wound has healed or until your skin has returned to normal thickness.  9. If the Clinic is closed proceed to the nearest emergency room or urgent care.This is a non-traditional cast and must be removed in a different manner.  10. Call clinic with any problems with your cast. Our phone number is [phone].  After hours please phone your PCP or go to the nearest ER.      Main Provider: Colette Fisher NP April 12, 2024    Call us at [phone] if you have any questions about your wounds, if you have redness or swelling around your wound, have a fever of 101 degrees Fahrenheit or greater or if you have any other problems or concerns. We answer the phone Monday through Friday 8 am to 4 pm, please leave a message as we check the voicemail frequently throughout the day. If " you have a concern over the weekend, please leave a message and we will return your call Monday. If the need is urgent, go to the ER or urgent care.    If you had a positive experience please indicate that on your patient satisfaction survey form that [facility] will be sending you.    It was a pleasure meeting with you today.  Thank you for allowing me and my team the privilege of caring for you today.  YOU are the reason we are here, and I truly hope we provided you with the excellent service you deserve.  Please let us know if there is anything else we can do for you so that we can be sure you are leaving completely satisfied with your care experience.      If you have any billing related questions please call the [facility] Business office at [phone]. The clinic staff does not handle billing related matters.    If you are scheduled to have a follow up appointment, you will receive a reminder call the day before your visit. On the appointment day please arrive 15 minutes prior to your appointment time. If you are unable to keep that appointment, please call the clinic to cancel or reschedule. If you are more than 10 minutes late or greater for your scheduled appointment time, the clinic policy is that you may be asked to reschedule.         Electronically signed by Colette Fisher CNP on April 12, 2024

## 2024-04-12 NOTE — ADDENDUM NOTE
Encounter addended by: Colette Fisher NP on: 4/12/2024 12:15 PM   Actions taken: Order list changed, Diagnosis association updated, Visit diagnoses modified

## 2024-04-12 NOTE — TELEPHONE ENCOUNTER
Phone call to patient. [facility] is [facility] by [facility] and they change a cast on his foot every week that they see him. He is scheduled to see Dr. Manriquez on 4/23/24 and ask if we will be able to remove the cast and/or replace it at the appointment or if he should have the Wound Clinic remove it first.   Will check with provider and get back with him.     Please advise.     AMMON Ceron RN

## 2024-04-12 NOTE — DISCHARGE INSTRUCTIONS
"04/12/2024   Judie Rojas   1960    A DME order was not completed because the patient is having dressing changes done at [facility]. PLEASE BRING ANY SUPPLIES THAT WERE GIVEN TO YOU WITH YOU TO YOUR NEXT VISIT.     PLAN: 04/12/24  -REFERRAL BACK TO DR [facility] TO GET REEVALUATION IN THE NEXT 1-2 WEEKS IF POSSIBLE; LET THEM KNOW THAT YOU ARE CURRENTLY BEING CASTED AND ASK WHETHER YOU NEED TO HAVE THE CAST REMOVED PRIOR TO YOUR APPOINTMENT THERE OR IF THEY HAVE CAPACITY TO REMOVE IT; CALL FOR APPOINTMENT:  DR RADHA MORGAN [facility]  [address], [address] [address]  Phone: [phone]  Fax: [phone]  -WE WILL DETERMINE IN YOUR NEXT APPOINTMENT WHETHER TO REAPPLY EPIFIX  Epifix 14mm applied 3/8/24; 3/14/24; 3/22/24; 3/29/24; 4/4/24, 4/12/24  Continue to wear cast protector in shower  Continue to minimize walking/weight bearing; If the cast is \"loose\" or \"rubbing\" or causing pain, please call us- it may need to be  changed.  Keep blood sugars below 150 and A1C below 7  Whenever you sit, raise your ankle above your hips to promote wound healing  Reach out to [facility] to get new orthotics (order sent 09/2023): # [phone]  Consider seeing Podiatry to discuss release of toe tendon to allow for straightening  [facility]  [address], [address], [place] [address]  Phone: [phone]     Wound Dressing Change: Right toe 3 - HEALED  -Cleanse with mild unscented soap and water (such as Cetaphil, Cerave or Dove)  - Apply a ceramide based lotion (Cera-Ve, Vanicream, Cetaphil) to foot - avoiding the toes  - Make sure to dry UNDER and BETWEEN your toes  - Consider applying a dusting of foot powder to skin around toes to help manage moisture  Monitor for signs of new wounds or areas of concern     Wound Dressing Change: Left Plantar Foot  - Prophase protocol  - Apply small amount of VASHE on gauze, lay into wound bed, remove gauze " "  - SILVER NITRATE APPLIED  - Epifix 14mm applied to wound followed by MEPITEL and Sterile Strips  - MELGISORB 1/2  - Cover with Drawtex pad  - Apply Spandage size 7  - Apply TCC-EZ SIZE 3  Change weekly at [facility]      Protein:  A diet high in protein is important for wound healing, we recommend getting up to 90 grams of protein per day.  Taking protein shakes or bars are a good way to get extra protein in your diet.    Keep leg dry with use of a cast protector (available at [facility] or [place])     TCC-EZ CAST:  The TCC-EZ Total Contact Cast that has been placed on your foot and leg by your doctor has been proven to be an effective method of off-loading your foot. It is important that you understand potential problems with the cast so they can be avoided. Please follow the instructions below during your care.  1. Limit activity for first 24 hours after cast is applied  2. The outer boot MUST always be worn when walking.  3. Do not insert anything under the cast. Injury to your skin can be very dangerous.  4. Subsequent cast changes will take place from 1-2 times per week as instructed.  5. . If the cast is \"loose\" or \"rubbing\" or causing pain, please call us- it may need to be changed. Or if you develop a fever, chills, nausea, or vomiting the cast must be removed to check the wound  6. Keep your cast dry. If the cast gets wet, it must be changed immediately. (Use a protective cast bag when you are bathing.)  7. If the cast is removed in the hospital or ER the cast technician must be notified there is only padding on the front over the shin, on the ankles and over the foot and toes.  8. You will need to wear a cast for 1-2 additional weeks after your wound has healed or until your skin has returned to normal thickness.  9. If the Clinic is closed proceed to the nearest emergency room or urgent care.This is a non-traditional cast and must be removed in a different manner.  10. Call clinic with any problems with your cast. " Our phone number is [phone].  After hours please phone your PCP or go to the nearest ER.      Main Provider: Colette Fisher NP April 12, 2024    Call us at [phone] if you have any questions about your wounds, if you have redness or swelling around your wound, have a fever of 101 degrees Fahrenheit or greater or if you have any other problems or concerns. We answer the phone Monday through Friday 8 am to 4 pm, please leave a message as we check the voicemail frequently throughout the day. If you have a concern over the weekend, please leave a message and we will return your call Monday. If the need is urgent, go to the ER or urgent care.    If you had a positive experience please indicate that on your patient satisfaction survey form that [facility] will be sending you.    It was a pleasure meeting with you today.  Thank you for allowing me and my team the privilege of caring for you today.  YOU are the reason we are here, and I truly hope we provided you with the excellent service you deserve.  Please let us know if there is anything else we can do for you so that we can be sure you are leaving completely satisfied with your care experience.      If you have any billing related questions please call the [facility] Business office at [phone]. The clinic staff does not handle billing related matters.    If you are scheduled to have a follow up appointment, you will receive a reminder call the day before your visit. On the appointment day please arrive 15 minutes prior to your appointment time. If you are unable to keep that appointment, please call the clinic to cancel or reschedule. If you are more than 10 minutes late or greater for your scheduled appointment time, the clinic policy is that you may be asked to reschedule.

## 2024-04-12 NOTE — TELEPHONE ENCOUNTER
Phone call to patient and he was informed of recommendations below. He verbalized understanding.     AMMON Ceron RN

## 2024-04-12 NOTE — TELEPHONE ENCOUNTER
[facility] Call Center    Phone Message    May a detailed message be left on voicemail: yes     Reason for Call: Other: patient with up coming appt - he needs to know if the cast needs to be removed before the appt or if Dr. Manriquez will remove it?      Action Taken: Other: te    Travel Screening: Not Applicable

## 2024-04-12 NOTE — TELEPHONE ENCOUNTER
Looks like patient has been having total contact casts applied to the foot at the wound center. We don't do that here in the clinic so the cast will not be reapplied and I would recommend that he have it removed at the wound center as there is a particular way they have to remove them.     Please let patient know.     Thanks,    Rachelle Manriquez DPM

## 2024-04-19 ENCOUNTER — HOSPITAL ENCOUNTER (OUTPATIENT)
Dept: WOUND CARE | Facility: CLINIC | Age: 64
Discharge: HOME | End: 2024-04-19
Attending: PHYSICIAN ASSISTANT | Admitting: PHYSICIAN ASSISTANT
Payer: COMMERCIAL

## 2024-04-19 VITALS — TEMPERATURE: 98.3 F | SYSTOLIC BLOOD PRESSURE: 145 MMHG | DIASTOLIC BLOOD PRESSURE: 82 MMHG | HEART RATE: 66 BPM

## 2024-04-19 DIAGNOSIS — E11.621: ICD-10-CM

## 2024-04-19 DIAGNOSIS — L97.522: ICD-10-CM

## 2024-04-19 DIAGNOSIS — L97.522 DIABETIC ULCER OF OTHER PART OF LEFT FOOT ASSOCIATED WITH TYPE 2 DIABETES MELLITUS, WITH FAT LAYER EXPOSED (H): ICD-10-CM

## 2024-04-19 DIAGNOSIS — E11.621 DIABETIC ULCER OF LEFT MIDFOOT ASSOCIATED WITH TYPE 2 DIABETES MELLITUS, WITH FAT LAYER EXPOSED (H): ICD-10-CM

## 2024-04-19 DIAGNOSIS — L97.522 CHRONIC FOOT ULCER WITH FAT LAYER EXPOSED, LEFT (H): ICD-10-CM

## 2024-04-19 DIAGNOSIS — E11.621 DIABETIC ULCER OF LEFT FOOT WITH FAT LAYER EXPOSED (H): Primary | ICD-10-CM

## 2024-04-19 DIAGNOSIS — L97.522 DIABETIC ULCER OF LEFT FOOT WITH FAT LAYER EXPOSED (H): Primary | ICD-10-CM

## 2024-04-19 DIAGNOSIS — L97.422 DIABETIC ULCER OF LEFT MIDFOOT ASSOCIATED WITH TYPE 2 DIABETES MELLITUS, WITH FAT LAYER EXPOSED (H): ICD-10-CM

## 2024-04-19 DIAGNOSIS — E11.621 DIABETIC ULCER OF OTHER PART OF LEFT FOOT ASSOCIATED WITH TYPE 2 DIABETES MELLITUS, WITH FAT LAYER EXPOSED (H): ICD-10-CM

## 2024-04-19 PROCEDURE — 97602 WOUND(S) CARE NON-SELECTIVE: CPT

## 2024-04-19 PROCEDURE — 29445 APPL RIGID TOT CNTC LEG CAST: CPT | Mod: LT | Performed by: PHYSICIAN ASSISTANT

## 2024-04-19 PROCEDURE — 29445 APPL RIGID TOT CNTC LEG CAST: CPT | Performed by: PHYSICIAN ASSISTANT

## 2024-04-19 PROCEDURE — 99212 OFFICE O/P EST SF 10 MIN: CPT | Mod: 25 | Performed by: PHYSICIAN ASSISTANT

## 2024-04-19 NOTE — PROGRESS NOTES
"[facility]    ASSESSMENT:   Diabetic ulcer of other part of left foot associated with type 2 diabetes mellitus, with fat layer exposed   Colonization with PO antibiotic resistant pseudomonas    PLAN/DISCUSSION:   Ioplex + drawtex edema roll + TCC EZ cast applied    Interval Hx:   4/19: Returns to clinic. Wound smaller but more macerated. EZ cast held up well. Has appt with Dr. Manriquez on Tuesday to discuss surgical offloading.     HISTORY OF PRESENT ILLNESS:   Patient was seen at wound center for L foot wound. Previously followed by Dr. Soto. Per her note: \"He now follows up for his left foot, following second metatarsal resection on 10/19. Had an MRI that confirmed osteomyelitis and was scheduled for surgery quickly afterwards. Still takes augmentin long term per Id for chronic osteomyelitis.\"     2/1: TCC EZ started weekly.      2/20: TCC EZ cracked at ankle. Changed to Essity cast system and ActiCoat 7 as primary dressing. Green drainage so started on cipro and cultured - resistant to orals.     2/23: Wound improved and green drainage lessened. Started topical gent on wound and periwound. No issues with Essity cast.     2/29: Wound bed appears . Unable to do 3C patch given low platelets and DOAC. Continue Gentamicin ointment and Essity cast.       3/4: Stepped in water 3/1 and cast broke. Foot macerated with DTI of heel. Drawtex added to regimen.    3/8: Minimal green drainage, wound granular. EpiFix #1 applied.     3/14: Minimal green drainage, overall less drainage noted. Wound is granular with noted improvement of depth of the lateral edge of wound. EpiFix #2 applied, melgisorb, mepitel, spandage, exudry, TCC.     3/22: Decreasing in size. EpiFix #3 applied + TCC.    3/29: Returns to clinic. Wound measuring smaller, excellent granulation tissue. 4th EpiFix applied with Essity cast.     4/4: Wound smaller, 5th Epifix applied.    4/12: Cast cracked, no wound improvement. Referred to " surgical podiatry for surgical offloading consult. Epifix #6 applied with EZ cast.     VITALS: BP (!) 145/82 (BP Location: Left arm, Patient Position: Fowlers)   Pulse 66   Temp 98.3  F (36.8  C) (Temporal)      PHYSICAL EXAM:  GENERAL: Patient is alert and oriented and in no acute distress  INTEGUMENTARY:   Wound (used by OP [facility] only) 09/25/23 1505 Left plantar foot surgical (Active)   Thickness/Stage full thickness 04/19/24 1500   Base pink;yellow 04/19/24 1500   Periwound macerated 04/19/24 1500   Periwound Temperature warm 04/19/24 1500   Periwound Skin Turgor soft 04/19/24 1500   Edges callused;open 04/19/24 1500   Length (cm) 1 04/19/24 1500   Width (cm) 1 04/19/24 1500   Depth (cm) 0.2 04/19/24 1500   Wound (cm^2) 1 cm^2 04/19/24 1500   Wound Volume (cm^3) 0.2 cm^3 04/19/24 1500   Wound healing % -75.44 04/19/24 1500   Drainage Characteristics/Odor serosanguineous;green 04/19/24 1500   Drainage Amount moderate 04/19/24 1500   Care, Wound non-select wound debridement performed. 04/19/24 1500         PROCEDURE: TCC EZ cast applied     PATIENT INSTRUCTIONS      Further instructions from your care team         04/19/2024   Judie Rojas   1960    A DME order was not completed because the patient is having dressing changes done at [facility]. PLEASE BRING ANY SUPPLIES THAT WERE GIVEN TO YOU WITH YOU TO YOUR NEXT VISIT.     PLAN:   Appointment with Dr. Manriquez 4/23 to discuss surgical interventions (We will reapply cast after on 4/24)  Epifix 14mm applied 3/8/24; 3/14/24; 3/22/24; 3/29/24; 4/4/24, 4/12/24   Continue to wear cast protector in shower  Continue to minimize walking/weight bearing  Keep blood sugars below 150 and A1C below 7  Whenever you sit, raise your ankle above your hips to promote wound healing  Reach out to [facility] to get new orthotics (order sent 09/2023): # [phone]     Right foot care, daily:  - Cleanse with mild unscented soap and water (such as Cetaphil, Cerave or Dove)  - Apply a ceramide  "based lotion (Cera-Ve, Vanicream, Cetaphil) to foot - avoiding the toes  - Make sure to dry UNDER and BETWEEN your toes  - Consider applying a dusting of foot powder to skin around toes to help manage moisture  Monitor for signs of new wounds or areas of concern     Wound Dressing Change: Left Plantar Foot  - Prophase protocol  - Apply drysol to macerated area  - Area painted with betadine  - Apply ioplex to wound bed   - Cover with Drawtex soft roll  - Apply Spandage size 7  - Apply TCC-EZ SIZE 3  Change weekly at [facility]      Protein:  A diet high in protein is important for wound healing, we recommend getting up to 90 grams of protein per day.  Taking protein shakes or bars are a good way to get extra protein in your diet.     TCC-EZ CAST:  The TCC-EZ Total Contact Cast that has been placed on your foot and leg by your doctor has been proven to be an effective method of off-loading your foot. It is important that you understand potential problems with the cast so they can be avoided. Please follow the instructions below during your care.  1. Limit activity for first 24 hours after cast is applied  2. The outer boot MUST always be worn when walking.  3. Do not insert anything under the cast. Injury to your skin can be very dangerous.  4. Subsequent cast changes will take place from 1-2 times per week as instructed.  5. . If the cast is \"loose\" or \"rubbing\" or causing pain, please call us- it may need to be changed. Or if you develop a fever, chills, nausea, or vomiting the cast must be removed to check the wound  6. Keep your cast dry. If the cast gets wet, it must be changed immediately. (Use a protective cast bag when you are bathing.)  7. If the cast is removed in the hospital or ER the cast technician must be notified there is only padding on the front over the shin, on the ankles and over the foot and toes.  8. You will need to wear a cast for 1-2 additional weeks after your wound has healed or until your skin " has returned to normal thickness.  9. If the Clinic is closed proceed to the nearest emergency room or urgent care.This is a non-traditional cast and must be removed in a different manner.  10. Call clinic with any problems with your cast. Our phone number is [phone].  After hours please go to the nearest ER.     Main Provider: Alyssa Rosenthal PA-C or Colette Fisher NP April 19, 2024    Call us at [phone] if you have any questions about your wounds, if you have redness or swelling around your wound, have a fever of 101 degrees Fahrenheit or greater or if you have any other problems or concerns. We answer the phone Monday through Friday 8 am to 4 pm, please leave a message as we check the voicemail frequently throughout the day. If you have a concern over the weekend, please leave a message and we will return your call Monday. If the need is urgent, go to the ER or urgent care.    If you had a positive experience please indicate that on your patient satisfaction survey form that [facility] will be sending you.    It was a pleasure meeting with you today.  Thank you for allowing me and my team the privilege of caring for you today.  YOU are the reason we are here, and I truly hope we provided you with the excellent service you deserve.  Please let us know if there is anything else we can do for you so that we can be sure you are leaving completely satisfied with your care experience.      If you have any billing related questions please call the [facility] Business office at [phone]. The clinic staff does not handle billing related matters.    If you are scheduled to have a follow up appointment, you will receive a reminder call the day before your visit. On the appointment day please arrive 15 minutes prior to your appointment time. If you are unable to keep that appointment, please call the clinic to cancel or reschedule. If you are more than 10 minutes late or greater for your scheduled  appointment time, the clinic policy is that you may be asked to reschedule.

## 2024-04-19 NOTE — DISCHARGE INSTRUCTIONS
04/19/2024   Judie Rojas   1960    A DME order was not completed because the patient is having dressing changes done at [facility]. PLEASE BRING ANY SUPPLIES THAT WERE GIVEN TO YOU WITH YOU TO YOUR NEXT VISIT.     PLAN:   Appointment with Dr. Manriquez 4/23 to discuss surgical interventions (We will reapply cast after on 4/24)  Epifix 14mm applied 3/8/24; 3/14/24; 3/22/24; 3/29/24; 4/4/24, 4/12/24   Continue to wear cast protector in shower  Continue to minimize walking/weight bearing  Keep blood sugars below 150 and A1C below 7  Whenever you sit, raise your ankle above your hips to promote wound healing  Reach out to [facility] to get new orthotics (order sent 09/2023): # [phone]     Right foot care, daily:  - Cleanse with mild unscented soap and water (such as Cetaphil, Cerave or Dove)  - Apply a ceramide based lotion (Cera-Ve, Vanicream, Cetaphil) to foot - avoiding the toes  - Make sure to dry UNDER and BETWEEN your toes  - Consider applying a dusting of foot powder to skin around toes to help manage moisture  Monitor for signs of new wounds or areas of concern     Wound Dressing Change: Left Plantar Foot  - Prophase protocol  - Apply drysol to macerated area  - Area painted with betadine  - Apply ioplex to wound bed   - Cover with Drawtex soft roll  - Apply Spandage size 7  - Apply TCC-EZ SIZE 3  Change weekly at [facility]      Protein:  A diet high in protein is important for wound healing, we recommend getting up to 90 grams of protein per day.  Taking protein shakes or bars are a good way to get extra protein in your diet.     TCC-EZ CAST:  The TCC-EZ Total Contact Cast that has been placed on your foot and leg by your doctor has been proven to be an effective method of off-loading your foot. It is important that you understand potential problems with the cast so they can be avoided. Please follow the instructions below during your care.  1. Limit activity for first 24 hours after cast is applied  2. The outer boot  "MUST always be worn when walking.  3. Do not insert anything under the cast. Injury to your skin can be very dangerous.  4. Subsequent cast changes will take place from 1-2 times per week as instructed.  5. . If the cast is \"loose\" or \"rubbing\" or causing pain, please call us- it may need to be changed. Or if you develop a fever, chills, nausea, or vomiting the cast must be removed to check the wound  6. Keep your cast dry. If the cast gets wet, it must be changed immediately. (Use a protective cast bag when you are bathing.)  7. If the cast is removed in the hospital or ER the cast technician must be notified there is only padding on the front over the shin, on the ankles and over the foot and toes.  8. You will need to wear a cast for 1-2 additional weeks after your wound has healed or until your skin has returned to normal thickness.  9. If the Clinic is closed proceed to the nearest emergency room or urgent care.This is a non-traditional cast and must be removed in a different manner.  10. Call clinic with any problems with your cast. Our phone number is [phone].  After hours please go to the nearest ER.     Main Provider: Alyssa Rosenthal PA-C or Colette Fisher NP April 19, 2024    Call us at [phone] if you have any questions about your wounds, if you have redness or swelling around your wound, have a fever of 101 degrees Fahrenheit or greater or if you have any other problems or concerns. We answer the phone Monday through Friday 8 am to 4 pm, please leave a message as we check the voicemail frequently throughout the day. If you have a concern over the weekend, please leave a message and we will return your call Monday. If the need is urgent, go to the ER or urgent care.    If you had a positive experience please indicate that on your patient satisfaction survey form that [facility] will be sending you.    It was a pleasure meeting with you today.  Thank you for allowing me and my team the " privilege of caring for you today.  YOU are the reason we are here, and I truly hope we provided you with the excellent service you deserve.  Please let us know if there is anything else we can do for you so that we can be sure you are leaving completely satisfied with your care experience.      If you have any billing related questions please call the [facility] Business office at [phone]. The clinic staff does not handle billing related matters.    If you are scheduled to have a follow up appointment, you will receive a reminder call the day before your visit. On the appointment day please arrive 15 minutes prior to your appointment time. If you are unable to keep that appointment, please call the clinic to cancel or reschedule. If you are more than 10 minutes late or greater for your scheduled appointment time, the clinic policy is that you may be asked to reschedule.

## 2024-04-22 ENCOUNTER — OFFICE VISIT (OUTPATIENT)
Dept: OTOLARYNGOLOGY | Facility: CLINIC | Age: 64
End: 2024-04-22
Payer: COMMERCIAL

## 2024-04-22 DIAGNOSIS — J34.89 NASAL LESION: Primary | ICD-10-CM

## 2024-04-22 PROCEDURE — 99212 OFFICE O/P EST SF 10 MIN: CPT | Performed by: OTOLARYNGOLOGY

## 2024-04-22 RX ORDER — MUPIROCIN 20 MG/G
OINTMENT TOPICAL
Qty: 30 G | Refills: 0 | Status: SHIPPED | OUTPATIENT
Start: 2024-04-22 | End: 2024-05-02

## 2024-04-22 NOTE — NURSING NOTE
Chief Complaint   Patient presents with    RECHECK   There were no vitals taken for this visit. Moises Shook, EMT

## 2024-04-22 NOTE — PROGRESS NOTES
HISTORY OF PRESENT ILLNESS:   Crispin Rojas is a 64 year old year old male who presents today for reevaluation of his left caudal septum.  We had removed the mucosa from the left caudal septum a month ago and he is kept it very moisturized.  Things are nearly fully healed according to the patient.  He is not having any pain or bleeding of significance.    PHYSICAL EXAMINATION:  In no acute distress. Normal mood, normal affect, alert, and appropriate. Head is normocephalic. Cranial nerve VII is House-Backmann I out of VI bilaterally. Breathing without difficulty or stridor. Eyes are anicteric.     Examination of the nose shows nearly fully healed left caudal septum.    Pathology report is reviewed and does not show any dysplasia    ASSESSMENT/PLAN:   Crispin Rojas is a 64 year old year old male who presents today for follow-up on removal of diseased mucosa on left caudal septum.  Healing well.  Follow-up as needed.  Encourage good nasal hydration.    FOLLOW UP: prn    Scribe Disclosure:   I, Leidy Simon, am serving as a scribe; to document services personally performed by Nikos Armstrong MD -based on data collection and the provider's statements to me.     Provider Disclosure:  I agree with above History, Review of Systems, Physical exam and Plan.  I have reviewed the content of the documentation and have edited it as needed. I have personally performed the services documented here and the documentation accurately represents those services and the decisions I have made.      Electronically signed by:  Nikos Armstrong MD

## 2024-04-22 NOTE — LETTER
4/22/2024       RE: Crispin Rojas  [address] [facility] [address]     Dear Colleague,    Thank you for referring your patient, Crispin Rojas, to the [facility] [place] at [facility]. Please see a copy of my visit note below.    HISTORY OF PRESENT ILLNESS:   Crispin Rojas is a 64 year old year old male who presents today for reevaluation of his left caudal septum.  We had removed the mucosa from the left caudal septum a month ago and he is kept it very moisturized.  Things are nearly fully healed according to the patient.  He is not having any pain or bleeding of significance.    PHYSICAL EXAMINATION:  In no acute distress. Normal mood, normal affect, alert, and appropriate. Head is normocephalic. Cranial nerve VII is House-Backmann I out of VI bilaterally. Breathing without difficulty or stridor. Eyes are anicteric.     Examination of the nose shows nearly fully healed left caudal septum.    Pathology report is reviewed and does not show any dysplasia    ASSESSMENT/PLAN:   Crispin Rojas is a 64 year old year old male who presents today for follow-up on removal of diseased mucosa on left caudal septum.  Healing well.  Follow-up as needed.  Encourage good nasal hydration.    FOLLOW UP: prn    Scribe Disclosure:   I, Leidy Simon, am serving as a scribe; to document services personally performed by Nikos Armstrong MD -based on data collection and the provider's statements to me.     Provider Disclosure:  I agree with above History, Review of Systems, Physical exam and Plan.  I have reviewed the content of the documentation and have edited it as needed. I have personally performed the services documented here and the documentation accurately represents those services and the decisions I have made.      Electronically signed by:  Nikos Armstrong MD        Again, thank you for allowing me to participate in the care of your  patient.      Sincerely,    Nikos Armstrong MD

## 2024-04-22 NOTE — PATIENT INSTRUCTIONS
You were seen in the ENT Clinic today by Dr. Armstrong. If you have any questions or concerns after your appointment, please contact us (see below)     2.   Plan to return to the clinic as needed.              How to Contact Us:  Send a "[employer] message to your provider. Our team will respond to you via "[employer]. Occasionally, we will need to call you to get further information.  For urgent matters (Monday-Friday), call the ENT Clinic: [phone] and speak with a call center team member - they will route your call appropriately.   If you'd like to speak directly with a nurse, please find our contact information below. We do our best to check voicemail frequently throughout the day, and will work to call you back within 1-2 days. For urgent matters, please use the general clinic phone numbers listed above.        Adelina ORTIZ RN  ENT RN Care Coordinator  Direct: [phone]  Naye MCKINLEY LPN  Direct: [phone]           [facility]  Department of Otolaryngology

## 2024-04-23 ENCOUNTER — OFFICE VISIT (OUTPATIENT)
Dept: PODIATRY | Facility: CLINIC | Age: 64
End: 2024-04-23
Payer: COMMERCIAL

## 2024-04-23 ENCOUNTER — HOSPITAL ENCOUNTER (OUTPATIENT)
Dept: WOUND CARE | Facility: CLINIC | Age: 64
Discharge: HOME | End: 2024-04-23
Attending: PHYSICIAN ASSISTANT | Admitting: PHYSICIAN ASSISTANT
Payer: COMMERCIAL

## 2024-04-23 VITALS — SYSTOLIC BLOOD PRESSURE: 156 MMHG | HEART RATE: 65 BPM | DIASTOLIC BLOOD PRESSURE: 78 MMHG

## 2024-04-23 VITALS — SYSTOLIC BLOOD PRESSURE: 144 MMHG | BODY MASS INDEX: 35.4 KG/M2 | WEIGHT: 261 LBS | DIASTOLIC BLOOD PRESSURE: 70 MMHG

## 2024-04-23 DIAGNOSIS — L97.522 DIABETIC ULCER OF OTHER PART OF LEFT FOOT ASSOCIATED WITH TYPE 2 DIABETES MELLITUS, WITH FAT LAYER EXPOSED (H): ICD-10-CM

## 2024-04-23 DIAGNOSIS — L97.422 DIABETIC ULCER OF LEFT MIDFOOT ASSOCIATED WITH DIABETES MELLITUS DUE TO UNDERLYING CONDITION, WITH FAT LAYER EXPOSED (H): Primary | ICD-10-CM

## 2024-04-23 DIAGNOSIS — S98.112A AMPUTATION OF LEFT GREAT TOE (H): ICD-10-CM

## 2024-04-23 DIAGNOSIS — E11.621 DIABETIC ULCER OF OTHER PART OF LEFT FOOT ASSOCIATED WITH TYPE 2 DIABETES MELLITUS, WITH FAT LAYER EXPOSED (H): ICD-10-CM

## 2024-04-23 DIAGNOSIS — E11.42 DIABETIC POLYNEUROPATHY ASSOCIATED WITH TYPE 2 DIABETES MELLITUS (H): Primary | ICD-10-CM

## 2024-04-23 DIAGNOSIS — E08.621 DIABETIC ULCER OF LEFT MIDFOOT ASSOCIATED WITH DIABETES MELLITUS DUE TO UNDERLYING CONDITION, WITH FAT LAYER EXPOSED (H): Primary | ICD-10-CM

## 2024-04-23 PROCEDURE — 99212 OFFICE O/P EST SF 10 MIN: CPT | Performed by: PHYSICIAN ASSISTANT

## 2024-04-23 PROCEDURE — 11042 DBRDMT SUBQ TIS 1ST 20SQCM/<: CPT | Performed by: PODIATRIST

## 2024-04-23 PROCEDURE — 99213 OFFICE O/P EST LOW 20 MIN: CPT | Mod: 25 | Performed by: PODIATRIST

## 2024-04-23 PROCEDURE — 97602 WOUND(S) CARE NON-SELECTIVE: CPT

## 2024-04-23 NOTE — DISCHARGE INSTRUCTIONS
04/23/2024   Judie Rojas   1960      A DME order was not completed because the patient is having dressing changes done at [facility]. PLEASE BRING ANY SUPPLIES THAT WERE GIVEN TO YOU WITH YOU TO YOUR NEXT VISIT.     PLAN:   Appointment with Dr. Manriquez 4/23 to discuss surgical interventions (We will reapply cast after on 4/24)  Epifix 14mm applied 3/8/24; 3/14/24; 3/22/24; 3/29/24; 4/4/24, 4/12/24   Continue to wear cast protector in shower  Continue to minimize walking/weight bearing  Keep blood sugars below 150 and A1C below 7  Whenever you sit, raise your ankle above your hips to promote wound healing  Reach out to [employer] to get new orthotics (order sent 09/2023): # [phone]     Right foot care, daily:  - Cleanse with mild unscented soap and water (such as Cetaphil, Cerave or Dove)  - Apply a ceramide based lotion (Cera-Ve, Vanicream, Cetaphil) to foot - avoiding the toes  - Make sure to dry UNDER and BETWEEN your toes  - Consider applying a dusting of foot powder to skin around toes to help manage moisture  Monitor for signs of new wounds or areas of concern     Wound Dressing Change: Left Plantar Foot  (Today 4/23 applied gentamicin, Ioplex, Spandage, ABD/roll gauze)  - Prophase protocol  - Apply Drysol to macerated area  - Area painted with betadine  - Apply Ioplex to wound bed   - Cover with Drawtex soft roll  - Apply Spandage size 7  - Apply TCC-EZ SIZE 3  Change weekly at [facility]      Protein:  A diet high in protein is important for wound healing, we recommend getting up to 90 grams of protein per day.  Taking protein shakes or bars are a good way to get extra protein in your diet.     TCC-EZ CAST:  The TCC-EZ Total Contact Cast that has been placed on your foot and leg by your doctor has been proven to be an effective method of off-loading your foot. It is important that you understand potential problems with the cast so they can be avoided. Please follow the instructions below during your care.  1. Limit  "activity for first 24 hours after cast is applied  2. The outer boot MUST always be worn when walking.  3. Do not insert anything under the cast. Injury to your skin can be very dangerous.  4. Subsequent cast changes will take place from 1-2 times per week as instructed.  5. . If the cast is \"loose\" or \"rubbing\" or causing pain, please call us- it may need to be changed. Or if you develop a fever, chills, nausea, or vomiting the cast must be removed to check the wound  6. Keep your cast dry. If the cast gets wet, it must be changed immediately. (Use a protective cast bag when you are bathing.)  7. If the cast is removed in the hospital or ER the cast technician must be notified there is only padding on the front over the shin, on the ankles and over the foot and toes.  8. You will need to wear a cast for 1-2 additional weeks after your wound has healed or until your skin has returned to normal thickness.  9. If the Clinic is closed proceed to the nearest emergency room or urgent care.This is a non-traditional cast and must be removed in a different manner.  10. Call clinic with any problems with your cast. Our phone number is [phone].  After hours please go to the nearest ER.     Main Provider: Alyssa Rosenthal PA-C April 23, 2024    Call us at [phone] if you have any questions about your wounds, if you have redness or swelling around your wound, have a fever of 101 degrees Fahrenheit or greater or if you have any other problems or concerns. We answer the phone Monday through Friday 8 am to 4 pm, please leave a message as we check the voicemail frequently throughout the day. If you have a concern over the weekend, please leave a message and we will return your call Monday. If the need is urgent, go to the ER or urgent care.    If you had a positive experience please indicate that on your patient satisfaction survey form that [facility] will be sending you.    It was a pleasure meeting with you " today.  Thank you for allowing me and my team the privilege of caring for you today.  YOU are the reason we are here, and I truly hope we provided you with the excellent service you deserve.  Please let us know if there is anything else we can do for you so that we can be sure you are leaving completely satisfied with your care experience.      If you have any billing related questions please call the [facility] Business office at [phone]. The clinic staff does not handle billing related matters.    If you are scheduled to have a follow up appointment, you will receive a reminder call the day before your visit. On the appointment day please arrive 15 minutes prior to your appointment time. If you are unable to keep that appointment, please call the clinic to cancel or reschedule. If you are more than 10 minutes late or greater for your scheduled appointment time, the clinic policy is that you may be asked to reschedule.

## 2024-04-23 NOTE — PATIENT INSTRUCTIONS
Thank you for choosing [facility]le [facility]!    DR GARCIA'S CLINIC:  [facility]   [address]   [place] [address]      TRIAGE LINE: [phone]  APPOINTMENTS: [phone]  RADIOLOGY: [phone]  SET UP SURGERY: [phone]  PHYSICAL THERAPY: [phone]   FAX NUMBER: [phone]  BILLING QUESTIONS: [phone]       Follow up: 1 month        An order for Wound care supplies was placed for you today.   Please contact [facility] in [place] to set up plan to receive your supplies.     [facility]  [id number]  [place] [address]  Ph: [phone]  Fax: [phone]

## 2024-04-23 NOTE — PROGRESS NOTES
Podiatry / Foot and Ankle Surgery Progress Note    April 23, 2024    Subject: Patient was seen for nonhealing ulcer of left foot.  Notes it has been there for 3 months.  He has tried casting and other wound care treatments but it has not really helped.  He notes the last time he tried was Dry-Jean which seemed to help a little bit.  Denies fever, nausea, vomiting.  No pain but has baseline neuropathy.  Denies specific injury.    Objective:  Vitals: BP (!) 144/70   Wt 118.4 kg (261 lb)   BMI 35.40 kg/m      A1C: 7.9 (1/29/2024)    General:  Patient is alert and orientated.  NAD.    Vascular:  DP and PT pulses are palpable.  No edema or varicosities noted.  CFT's < 3secs.  Skin temp is normal.    Neuro:  Light and gross touch sensation is absent to feet.    Derm:  Full thickness ulceration to plantar aspect of the left third metatarsal head.  This measures approximately 1.4 cm x 1.2 cm x 0.2 cm.  This of the wound is granular.  No surrounding erythema purulent drainage or malodor noted.  Some maceration of the surrounding wound area noted.  Minimal clear serous drainage noted.    Musculoskeletal: Previous left first and second toe amputations.    Assessment:    Diabetic ulcer of other part of left foot associated with type 2 diabetes mellitus, with fat layer exposed (H)  Diabetic polyneuropathy associated with type 2 diabetes mellitus (H)  Amputation of left great toe (H24)      Medical Decision Making/Plan: At this time the ulcer was debrided.  Would recommend Ludivina dressing changes daily to the left foot ulcer.  Recommend covering the use with 2 x 2 gauze pads, gauze roll and tape.  His shoe insert is modified to try to help offload under the ulcer however the insert is a few years old and I recommend new shoes and inserts and a metatarsal bar on the left shoe to further help offload the ulcer.  We will have him follow-up in 1 month.      Procedure: After verbal consent, excisional debridement was performed on  ulcer.  #15 blade was used to debride ulcer down to and including subcutaneous tissue. Bleeding controlled with light pressure.   No drainage noted.  No anesthesia was used due to neuropathy. Dry dressing applied to foot.  Patient tolerated procedure well.      Patient Risk Factor:  Patient is a medium risk factor for infection.    Durable Medical Equipment Wound Care Orders       Wound Care Order for DME - ONLY FOR DME   As directed      Medical Equipment (DME) Supplier: [employer]    PATIENT INSTRUCTIONS: If you did not receive this ordered item today, please contact [employer] for availability ([place] Locations: [phone], [place]: [phone]).    Start Date: 2024    Required Documentation: .dmedocumentationall should be used to pull in required documentation into progress note    Wound Supply Order Options: Complex Wound    Optional: .dmewound can be used to pull in order specific information into documentation    Wound Number: Wound 1    Wound 1 Location: left foot ulcer    Wound 1 Dressing Change Frequency: Daily    Wound 1 Length of Need: 30 days    Wound 1 - Dressing Supplies:  Primary  Wrap/Gauze  Tape/Securing       Wound 1 - Primary Dressing Dispensing Instructions: Ok to Substitute    Wound 1 - Primary Dressing Types: Collagen w/ Silver    Wound 1 - Collagen w/ Silver Types: Ludivina AG    Wound 1 - Ludivina Size: 4 x 4    Wound 1 - Ludivina Quantity: 30    Wound 1 - Wrap/Gauze Types:  Rolls  Pads       Wound 1 - Roll Type: Bandage Roll Gauze    Wound 1 - Bandage Roll Gauze Size: 4.5 x 4.1    Wound 1 - Bandage Roll Gauze Quantity: 30 Rolls    Wound 1 - Pad Type: Sterile Gauze Pads    Wound 1 - Gauze Pad Size: 4 x 4 Comment - 2 per dressing change    Wound 1 - Gauze Qty for Dressing/Month: 60    Wound 1 - Tape Type: Medfix    Wound 1 - Medfix Size: 2 x 11    Wound 1 - Medfix Quantity: 1 Roll               Rachelle Manriquez DPM, Podiatry/Foot and Ankle  Surgery

## 2024-04-23 NOTE — LETTER
4/23/2024         RE: Crispin Rojas  [address] [facility] [address]        Dear Colleague,    Thank you for referring your patient, Crispin Rojas, to the [facility]. Please see a copy of my visit note below.    Podiatry / Foot and Ankle Surgery Progress Note    April 23, 2024    Subject: Patient was seen for nonhealing ulcer of left foot.  Notes it has been there for 3 months.  He has tried casting and other wound care treatments but it has not really helped.  He notes the last time he tried was Dry-Jean which seemed to help a little bit.  Denies fever, nausea, vomiting.  No pain but has baseline neuropathy.  Denies specific injury.    Objective:  Vitals: BP (!) 144/70   Wt 118.4 kg (261 lb)   BMI 35.40 kg/m      A1C: 7.9 (1/29/2024)    General:  Patient is alert and orientated.  NAD.    Vascular:  DP and PT pulses are palpable.  No edema or varicosities noted.  CFT's < 3secs.  Skin temp is normal.    Neuro:  Light and gross touch sensation is absent to feet.    Derm:  Full thickness ulceration to plantar aspect of the left third metatarsal head.  This measures approximately 1.4 cm x 1.2 cm x 0.2 cm.  This of the wound is granular.  No surrounding erythema purulent drainage or malodor noted.  Some maceration of the surrounding wound area noted.  Minimal clear serous drainage noted.    Musculoskeletal: Previous left first and second toe amputations.    Assessment:    Diabetic ulcer of other part of left foot associated with type 2 diabetes mellitus, with fat layer exposed (H)  Diabetic polyneuropathy associated with type 2 diabetes mellitus (H)  Amputation of left great toe (H24)      Medical Decision Making/Plan: At this time the ulcer was debrided.  Would recommend Ludivina dressing changes daily to the left foot ulcer.  Recommend covering the use with 2 x 2 gauze pads, gauze roll and tape.  His shoe insert is modified to try to help offload under the ulcer however the  insert is a few years old and I recommend new shoes and inserts and a metatarsal bar on the left shoe to further help offload the ulcer.  We will have him follow-up in 1 month.      Procedure: After verbal consent, excisional debridement was performed on ulcer.  #15 blade was used to debride ulcer down to and including subcutaneous tissue. Bleeding controlled with light pressure.   No drainage noted.  No anesthesia was used due to neuropathy. Dry dressing applied to foot.  Patient tolerated procedure well.      Patient Risk Factor:  Patient is a medium risk factor for infection.     Rachelle Manriquez DPM, Podiatry/Foot and Ankle Surgery            Again, thank you for allowing me to participate in the care of your patient.        Sincerely,        Rachelle Manriquez DPM, Podiatry/Foot and Ankle Surgery

## 2024-04-23 NOTE — PROGRESS NOTES
"[facility]    ASSESSMENT:   Diabetic ulcer of other part of left foot associated with type 2 diabetes mellitus, with fat layer exposed   Colonization with PO antibiotic resistant pseudomonas    PLAN/DISCUSSION:   Ioplex + ABD and Kerlix, return tomorrow for recast  Appt today with Dr. Manriquez to discuss any potential surgical interventions to help with durable offloading    Interval Hx:   04/23/24: Cast removed today in prep of foot and ankle surgery appt. Drawtex edema roll did great job of wicking away moisture from the area, foot appears less macerated. However, was only on for 4 days.     HISTORY OF PRESENT ILLNESS:   Patient was seen at wound center for L foot wound. Previously followed by Dr. Soto. Per her note: \"He now follows up for his left foot, following second metatarsal resection on 10/19. Had an MRI that confirmed osteomyelitis and was scheduled for surgery quickly afterwards. Still takes augmentin long term per Id for chronic osteomyelitis.\"     2/1: TCC EZ started weekly.      2/20: TCC EZ cracked at ankle. Changed to Essity cast system and ActiCoat 7 as primary dressing. Green drainage so started on cipro and cultured - resistant to orals.     2/23: Wound improved and green drainage lessened. Started topical gent on wound and periwound. No issues with Essity cast.     2/29: Wound bed appears . Unable to do 3C patch given low platelets and DOAC. Continue Gentamicin ointment and Essity cast.       3/4: Stepped in water 3/1 and cast broke. Foot macerated with DTI of heel. Drawtex added to regimen.    3/8: Minimal green drainage, wound granular. EpiFix #1 applied.     3/14: Minimal green drainage, overall less drainage noted. Wound is granular with noted improvement of depth of the lateral edge of wound. EpiFix #2 applied, melgisorb, mepitel, spandage, exudry, TCC.     3/22: Decreasing in size. EpiFix #3 applied + TCC.    3/29: Returns to clinic. Wound measuring smaller, " excellent granulation tissue. 4th EpiFix applied with Essity cast.     4/4: Wound smaller, 5th Epifix applied.    4/12: Cast cracked, no wound improvement. Referred to surgical podiatry for surgical offloading consult. Epifix #6 applied with EZ cast.     4/19: Wound smaller but more macerated. EZ cast held up well. Reapplied with Ioplex and drawtex edema roll beneath system.     VITALS: BP (!) 156/78 (BP Location: Right arm, Patient Position: Sitting)   Pulse 65      PHYSICAL EXAM:  GENERAL: Patient is alert and oriented and in no acute distress  INTEGUMENTARY:   Wound (used by OP [facility] only) 09/25/23 1505 Left plantar foot surgical (Active)   Thickness/Stage full thickness 04/23/24 0800   Base pink 04/23/24 0800   Periwound macerated 04/23/24 0800   Periwound Temperature warm 04/23/24 0800   Periwound Skin Turgor soft 04/23/24 0800   Edges callused;open 04/23/24 0800   Length (cm) 1.1 04/23/24 0800   Width (cm) 1.1 04/23/24 0800   Depth (cm) 0.1 04/23/24 0800   Wound (cm^2) 1.21 cm^2 04/23/24 0800   Wound Volume (cm^3) 0.12 cm^3 04/23/24 0800   Wound healing % -112.28 04/23/24 0800   Drainage Characteristics/Odor serosanguineous 04/23/24 0800   Drainage Amount moderate 04/23/24 0800   Care, Wound non-select wound debridement performed. 04/23/24 0800         PATIENT INSTRUCTIONS      Further instructions from your care team         04/23/2024   Judie Rojas   1960      A DME order was not completed because the patient is having dressing changes done at [facility]. PLEASE BRING ANY SUPPLIES THAT WERE GIVEN TO YOU WITH YOU TO YOUR NEXT VISIT.     PLAN:   Appointment with Dr. Manriquez 4/23 to discuss surgical interventions (We will reapply cast after on 4/24)  Epifix 14mm applied 3/8/24; 3/14/24; 3/22/24; 3/29/24; 4/4/24, 4/12/24   Continue to wear cast protector in shower  Continue to minimize walking/weight bearing  Keep blood sugars below 150 and A1C below 7  Whenever you sit, raise your ankle above your hips to promote  "wound healing  Reach out to [facility] to get new orthotics (order sent 09/2023): # [phone]     Right foot care, daily:  - Cleanse with mild unscented soap and water (such as Cetaphil, Cerave or Dove)  - Apply a ceramide based lotion (Cera-Ve, Vanicream, Cetaphil) to foot - avoiding the toes  - Make sure to dry UNDER and BETWEEN your toes  - Consider applying a dusting of foot powder to skin around toes to help manage moisture  Monitor for signs of new wounds or areas of concern     Wound Dressing Change: Left Plantar Foot  (Today 4/23 applied gentamicin, Ioplex, Spandage, ABD/roll gauze)  - Prophase protocol  - Apply Drysol to macerated area  - Area painted with betadine  - Apply Ioplex to wound bed   - Cover with Drawtex soft roll  - Apply Spandage size 7  - Apply TCC-EZ SIZE 3  Change weekly at [facility]      Protein:  A diet high in protein is important for wound healing, we recommend getting up to 90 grams of protein per day.  Taking protein shakes or bars are a good way to get extra protein in your diet.     TCC-EZ CAST:  The TCC-EZ Total Contact Cast that has been placed on your foot and leg by your doctor has been proven to be an effective method of off-loading your foot. It is important that you understand potential problems with the cast so they can be avoided. Please follow the instructions below during your care.  1. Limit activity for first 24 hours after cast is applied  2. The outer boot MUST always be worn when walking.  3. Do not insert anything under the cast. Injury to your skin can be very dangerous.  4. Subsequent cast changes will take place from 1-2 times per week as instructed.  5. . If the cast is \"loose\" or \"rubbing\" or causing pain, please call us- it may need to be changed. Or if you develop a fever, chills, nausea, or vomiting the cast must be removed to check the wound  6. Keep your cast dry. If the cast gets wet, it must be changed immediately. (Use a protective cast bag when you are " bathing.)  7. If the cast is removed in the hospital or ER the cast technician must be notified there is only padding on the front over the shin, on the ankles and over the foot and toes.  8. You will need to wear a cast for 1-2 additional weeks after your wound has healed or until your skin has returned to normal thickness.  9. If the Clinic is closed proceed to the nearest emergency room or urgent care.This is a non-traditional cast and must be removed in a different manner.  10. Call clinic with any problems with your cast. Our phone number is [phone].  After hours please go to the nearest ER.     Main Provider: Alyssa Rosenthal PA-C April 23, 2024    Call us at [phone] if you have any questions about your wounds, if you have redness or swelling around your wound, have a fever of 101 degrees Fahrenheit or greater or if you have any other problems or concerns. We answer the phone Monday through Friday 8 am to 4 pm, please leave a message as we check the voicemail frequently throughout the day. If you have a concern over the weekend, please leave a message and we will return your call Monday. If the need is urgent, go to the ER or urgent care.    If you had a positive experience please indicate that on your patient satisfaction survey form that [facility] will be sending you.    It was a pleasure meeting with you today.  Thank you for allowing me and my team the privilege of caring for you today.  YOU are the reason we are here, and I truly hope we provided you with the excellent service you deserve.  Please let us know if there is anything else we can do for you so that we can be sure you are leaving completely satisfied with your care experience.      If you have any billing related questions please call the [facility] Business office at [phone]. The clinic staff does not handle billing related matters.    If you are scheduled to have a follow up appointment, you will receive a reminder call  the day before your visit. On the appointment day please arrive 15 minutes prior to your appointment time. If you are unable to keep that appointment, please call the clinic to cancel or reschedule. If you are more than 10 minutes late or greater for your scheduled appointment time, the clinic policy is that you may be asked to reschedule.

## 2024-04-25 DIAGNOSIS — E11.42 TYPE 2 DIABETES MELLITUS WITH PERIPHERAL NEUROPATHY (H): ICD-10-CM

## 2024-04-26 RX ORDER — METFORMIN HCL 500 MG
1000 TABLET, EXTENDED RELEASE 24 HR ORAL 2 TIMES DAILY
Qty: 360 TABLET | Refills: 0 | Status: SHIPPED | OUTPATIENT
Start: 2024-04-26 | End: 2024-07-19

## 2024-04-26 NOTE — TELEPHONE ENCOUNTER
Requested Prescriptions   Pending Prescriptions Disp Refills    metFORMIN (GLUCOPHAGE XR) 500 MG 24 hr tablet [Pharmacy Med Name: METFORMIN ER 500MG 24HR TABS] 360 tablet 0     Sig: TAKE 2 TABLETS(1000 MG) BY MOUTH TWICE DAILY       Biguanide Agents Passed - 4/25/2024  4:37 PM        Passed - Patient is age 10 or older        Passed - Patient has documented A1c within the specified period of time.     If HgbA1C is 8 or greater, it needs to be on file within the past 3 months.  If less than 8, must be on file within the past 6 months.     Recent Labs   Lab Test 01/29/24  1032   A1C 7.9*             Passed - Patient does NOT have a diagnosis of CHF.        Passed - Medication is active on med list        Passed - Medication indicated for associated diagnosis     Medication is associated with one or more of the following diagnoses:     Gestational diabetes mellitus     Hyperinsulinar obesity     Hypersecretion of ovarian androgens    Non-alcoholic fatty liver    Polycystic ovarian syndrome               Pre-diabetes (DM 2 prevention)    Type 2 diabetes mellitus     Weight gain, antipsychotic therapy-induced             Passed - Has GFR on file in past 12 months and most recent value is normal        Passed - Recent (6 mo) or future (90 days) visit within the authorizing provider's specialty     The patient must have completed an in-person or virtual visit within the past 6 months or has a future visit scheduled within the next 90 days with the authorizing provider s specialty.  Urgent care and e-visits do not quality as an office visit for this protocol.

## 2024-05-11 NOTE — ED TRIAGE NOTES
Patient has right sore for the past couple weeks and has sore on left foot and leg along with pain. Patient developed fever of 100 yesterday. ABC's intact.   Yes.

## 2024-05-17 DIAGNOSIS — E11.42 TYPE 2 DIABETES MELLITUS WITH PERIPHERAL NEUROPATHY (H): ICD-10-CM

## 2024-05-17 RX ORDER — INSULIN ASPART 100 [IU]/ML
INJECTION, SOLUTION INTRAVENOUS; SUBCUTANEOUS
Qty: 30 ML | Refills: 2 | Status: ON HOLD | OUTPATIENT
Start: 2024-05-17 | End: 2024-06-17

## 2024-05-17 NOTE — TELEPHONE ENCOUNTER
Requested Prescriptions   Pending Prescriptions Disp Refills    Insulin Aspart FlexPen 100 UNIT/ML SOPN [Pharmacy Med Name: INSULIN ASPART FLEXPEN INJ, 3ML] 30 mL 1     Sig: ADMINISTER 70 UNITS UNDER THE SKIN DAILY       Insulin Protocol Failed - 5/17/2024  2:23 PM        Failed - Chart Review Required     Review Chart.    Do not approve if insulin is used in a pump.  Instead, direct refill request to the patient's endocrinologist.  If the patient doesn't have an endocrinologist, then send the refill to the patient's PCP for review            Passed - Medication is active on med list        Passed - Has GFR on file in past 12 months and most recent value is normal        Passed - Recent (6 mo) or future (90 days) visit within the authorizing provider's specialty     The patient must have completed an in-person or virtual visit within the past 6 months or has a future visit scheduled within the next 90 days with the authorizing provider s specialty.  Urgent care and e-visits do not quality as an office visit for this protocol.          Passed - Medication indicated for associated diagnosis     Medication is associated with one or more of the following diagnoses:   - Type 1 diabetes mellitus  - Type 2 diabetes mellitus  - Diabetic nephropathy; Prophylaxis  - Neuropathy due to diabetes mellitus; Prophylaxis  - Retinopathy due to diabetes mellitus; Prophylaxis  - Diabetes mellitus associated with cystic fibrosis  - Disorder of cardiovascular system; Prophylaxis - Type 1 diabetes mellitus   - Disorder of cardiovascular system; Prophylaxis - Type 2 diabetes mellitus            Passed - Patient is 18 years of age or older

## 2024-05-25 ENCOUNTER — HEALTH MAINTENANCE LETTER (OUTPATIENT)
Age: 64
End: 2024-05-25

## 2024-05-30 ENCOUNTER — OFFICE VISIT (OUTPATIENT)
Dept: PODIATRY | Facility: CLINIC | Age: 64
End: 2024-05-30
Payer: COMMERCIAL

## 2024-05-30 VITALS — WEIGHT: 261 LBS | BODY MASS INDEX: 35.4 KG/M2 | DIASTOLIC BLOOD PRESSURE: 78 MMHG | SYSTOLIC BLOOD PRESSURE: 134 MMHG

## 2024-05-30 DIAGNOSIS — L97.522 ULCER OF LEFT FOOT WITH FAT LAYER EXPOSED (H): ICD-10-CM

## 2024-05-30 DIAGNOSIS — E11.42 DIABETIC POLYNEUROPATHY ASSOCIATED WITH TYPE 2 DIABETES MELLITUS (H): Primary | ICD-10-CM

## 2024-05-30 DIAGNOSIS — S98.112A AMPUTATION OF LEFT GREAT TOE (H): ICD-10-CM

## 2024-05-30 PROCEDURE — 99213 OFFICE O/P EST LOW 20 MIN: CPT | Mod: 25 | Performed by: PODIATRIST

## 2024-05-30 PROCEDURE — 11042 DBRDMT SUBQ TIS 1ST 20SQCM/<: CPT | Performed by: PODIATRIST

## 2024-05-30 NOTE — PROGRESS NOTES
Podiatry / Foot and Ankle Surgery Progress Note    May 30, 2024    Subject: Patient was seen for nonhealing ulcer of left foot.  Notes he has been using Ludivina that he had from home.  Did not get the dressing supplies last time.  Denies fever, nausea, vomiting. No pain but has baseline neuropathy.     Objective:  Vitals: Wt 118.4 kg (261 lb)   BMI 35.40 kg/m    BMI= Body mass index is 35.4 kg/m .    A1C: 7.9 (1/29/2024)    General:  Patient is alert and orientated.  NAD.    A1C: 7.9 (1/29/2024)     General:  Patient is alert and orientated.  NAD.     Vascular:  DP and PT pulses are palpable.  No edema or varicosities noted.  CFT's < 3secs.  Skin temp is normal.     Neuro:  Light and gross touch sensation is absent to feet.     Derm:  Full thickness ulceration to plantar aspect of the left third metatarsal head.  This measures approximately 1.4 cm x 1.2 cm x 0.2 cm.  This of the wound is granular.  No surrounding erythema purulent drainage or malodor noted.  Some maceration of the surrounding wound area noted.  Minimal clear serous drainage noted.     Musculoskeletal: Previous left first and second toe amputations.     Assessment:    Diabetic ulcer of other part of left foot associated with type 2 diabetes mellitus, with fat layer exposed (H)  Diabetic polyneuropathy associated with type 2 diabetes mellitus (H)  Amputation of left great toe (H24)        Medical Decision Making/Plan: At this time the ulcer was debrided.  Would recommend Ludivina dressing changes daily to the left foot ulcer.  Recommend covering the use with 2 x 2 gauze pads, gauze roll and tape.  His shoe insert is modified to try to help offload under the ulcer however the insert is a few years old and I recommend new shoes and inserts and a metatarsal bar on the left shoe to further help offload the ulcer.  We will have him follow-up in 1 month.        Procedure: After verbal consent, excisional debridement was performed on ulcer.  #15 blade was used  to debride ulcer down to and including subcutaneous tissue. Bleeding controlled with light pressure.   No drainage noted.  No anesthesia was used due to neuropathy. Dry dressing applied to foot.  Patient tolerated procedure well.        Patient Risk Factor:  Patient is a medium risk factor for infection.    Assessment:    Diabetic polyneuropathy associated with type 2 diabetes mellitus (H)  Ulcer of left foot with fat layer exposed (H)  Amputation of left great toe (H24)      Medical Decision Making/Plan: At this time the ulcer was debrided.  Please see procedure note below.  We will put another order in for Ludivina Ag.  We will have him apply this daily and cover with 4 x 4 gauze pads, gauze roll and tape.  Continue and offloading inserts and shoes.  Discussed that he is at high risk of deeper infection.  Currently there are no clinical signs of infection.  We will have him follow-up in 1 month for reassessment.    All questions were answered to patient's satisfaction and he will call further questions or concerns.    Procedure: After verbal consent, excisional debridement was performed on ulcer.  #15 blade was used to debride ulcer down to and including subcutaneous tissue. Bleeding controlled with light pressure.   No drainage noted.  No anesthesia was used due to neuropathy. Dry dressing applied to foot.  Patient tolerated procedure well.      Patient Risk Factor:  Patient is a medium risk factor for infection.     Durable Medical Equipment Wound Care Orders       Wound Care Order for DME - ONLY FOR DME   As directed      Medical Equipment (DME) Supplier: [employer]    PATIENT INSTRUCTIONS: If you did not receive this ordered item today, please contact [employer] for availability ([place] Locations: [phone], [place]: [phone]).    Start Date: 2024    Required Documentation: .dmedocumentationall should be used to pull in required documentation into progress  note    Wound Supply Order Options: Complex Wound    Optional: .dmewound can be used to pull in order specific information into documentation    Wound Number: Wound 1    Wound 1 Location: left foot ulcer    Wound 1 Dressing Change Frequency: Daily    Wound 1 Length of Need: 30 days    Wound 1 - Dressing Supplies:  Primary  Wrap/Gauze  Tape/Securing       Wound 1 - Primary Dressing Dispensing Instructions: Ok to Substitute    Wound 1 - Primary Dressing Types: Collagen w/ Silver    Wound 1 - Collagen w/ Silver Types: Ludivina AG    Wound 1 - Ludivina Size: 4 x 4    Wound 1 - Ludivina Quantity: 30    Wound 1 - Wrap/Gauze Types:  Rolls  Pads       Wound 1 - Roll Type: Bandage Roll Gauze    Wound 1 - Bandage Roll Gauze Size: 4.5 x 4.1    Wound 1 - Bandage Roll Gauze Quantity: 30 Rolls    Wound 1 - Pad Type: Sterile Gauze Pads    Wound 1 - Gauze Pad Size: 4 x 4 Comment - 2 per dressing change    Wound 1 - Gauze Qty for Dressing/Month: 60    Wound 1 - Tape Type: Medfix    Wound 1 - Medfix Size: 2 x 11    Wound 1 - Medfix Quantity: 1 Roll              Rachelle Manriquez DPM, Podiatry/Foot and Ankle Surgery

## 2024-05-30 NOTE — LETTER
5/30/2024         RE: Crispin Rojas  [address] [facility] [address]        Dear Colleague,    Thank you for referring your patient, Crispin Rojas, to the [facility]OD[facility]. Please see a copy of my visit note below.    Podiatry / Foot and Ankle Surgery Progress Note    May 30, 2024    Subject: Patient was seen for nonhealing ulcer of left foot.  Notes he has been using Ludivina that he had from home.  Did not get the dressing supplies last time.  Denies fever, nausea, vomiting. No pain but has baseline neuropathy.     Objective:  Vitals: Wt 118.4 kg (261 lb)   BMI 35.40 kg/m    BMI= Body mass index is 35.4 kg/m .    A1C: 7.9 (1/29/2024)    General:  Patient is alert and orientated.  NAD.    A1C: 7.9 (1/29/2024)     General:  Patient is alert and orientated.  NAD.     Vascular:  DP and PT pulses are palpable.  No edema or varicosities noted.  CFT's < 3secs.  Skin temp is normal.     Neuro:  Light and gross touch sensation is absent to feet.     Derm:  Full thickness ulceration to plantar aspect of the left third metatarsal head.  This measures approximately 1.4 cm x 1.2 cm x 0.2 cm.  This of the wound is granular.  No surrounding erythema purulent drainage or malodor noted.  Some maceration of the surrounding wound area noted.  Minimal clear serous drainage noted.     Musculoskeletal: Previous left first and second toe amputations.     Assessment:    Diabetic ulcer of other part of left foot associated with type 2 diabetes mellitus, with fat layer exposed (H)  Diabetic polyneuropathy associated with type 2 diabetes mellitus (H)  Amputation of left great toe (H24)        Medical Decision Making/Plan: At this time the ulcer was debrided.  Would recommend Ludivina dressing changes daily to the left foot ulcer.  Recommend covering the use with 2 x 2 gauze pads, gauze roll and tape.  His shoe insert is modified to try to help offload under the ulcer however the insert is a few years  old and I recommend new shoes and inserts and a metatarsal bar on the left shoe to further help offload the ulcer.  We will have him follow-up in 1 month.        Procedure: After verbal consent, excisional debridement was performed on ulcer.  #15 blade was used to debride ulcer down to and including subcutaneous tissue. Bleeding controlled with light pressure.   No drainage noted.  No anesthesia was used due to neuropathy. Dry dressing applied to foot.  Patient tolerated procedure well.        Patient Risk Factor:  Patient is a medium risk factor for infection.    Assessment:    Diabetic polyneuropathy associated with type 2 diabetes mellitus (H)  Ulcer of left foot with fat layer exposed (H)  Amputation of left great toe (H24)      Medical Decision Making/Plan: At this time the ulcer was debrided.  Please see procedure note below.  We will put another order in for Ludivina Ag.  We will have him apply this daily and cover with 4 x 4 gauze pads, gauze roll and tape.  Continue and offloading inserts and shoes.  Discussed that he is at high risk of deeper infection.  Currently there are no clinical signs of infection.  We will have him follow-up in 1 month for reassessment.    All questions were answered to patient's satisfaction and he will call further questions or concerns.    Procedure: After verbal consent, excisional debridement was performed on ulcer.  #15 blade was used to debride ulcer down to and including subcutaneous tissue. Bleeding controlled with light pressure.   No drainage noted.  No anesthesia was used due to neuropathy. Dry dressing applied to foot.  Patient tolerated procedure well.      Patient Risk Factor:  Patient is a medium risk factor for infection.     Rachelle Manriquez DPM, Podiatry/Foot and Ankle Surgery          Again, thank you for allowing me to participate in the care of your patient.        Sincerely,        Rachelle Manriquez DPM, Podiatry/Foot and Ankle Surgery

## 2024-05-30 NOTE — PATIENT INSTRUCTIONS
Thank you for choosing [facility] Podiatry / Foot & Ankle Surgery!    DR GARCIA'S CLINIC:  [facility]   [address]   [place] [address]      TRIAGE LINE: [phone]  APPOINTMENTS: [phone]  RADIOLOGY: [phone]  SET UP SURGERY: [phone]  PHYSICAL THERAPY: [phone]   FAX NUMBER: [phone]  BILLING QUESTIONS: [phone]       Follow up: 4 weeks      [facility]  [place] [address]   [place] [address]  Ph: [phone]  Fax: [phone] [place] [facility]  [address]  [place] [address]  Ph: [phone]  Fax: [phone]   [place]  [address]   [place] [address]  Ph: [phone]  Fax: [phone] [place]  [address]  [place] [address]   Ph: [phone]    [place]  [address]  [place] [address]   Ph: [phone]  [place]  [address]  [place] [address]   Ph: [phone]      [place]  [id number]  [place] [address]  Ph: [phone]  [place]  [address]   [place] [address]  Ph: [phone]  Fax: [phone]

## 2024-06-16 ENCOUNTER — NURSE TRIAGE (OUTPATIENT)
Dept: NURSING | Facility: CLINIC | Age: 64
End: 2024-06-16
Payer: COMMERCIAL

## 2024-06-17 ENCOUNTER — HOSPITAL ENCOUNTER (INPATIENT)
Facility: CLINIC | Age: 64
LOS: 4 days | Discharge: HOME | End: 2024-06-21
Attending: EMERGENCY MEDICINE | Admitting: INTERNAL MEDICINE
Payer: COMMERCIAL

## 2024-06-17 ENCOUNTER — APPOINTMENT (OUTPATIENT)
Dept: GENERAL RADIOLOGY | Facility: CLINIC | Age: 64
End: 2024-06-17
Attending: EMERGENCY MEDICINE
Payer: COMMERCIAL

## 2024-06-17 ENCOUNTER — APPOINTMENT (OUTPATIENT)
Dept: ULTRASOUND IMAGING | Facility: CLINIC | Age: 64
End: 2024-06-17
Attending: EMERGENCY MEDICINE
Payer: COMMERCIAL

## 2024-06-17 DIAGNOSIS — M86.9 OSTEOMYELITIS OF LEFT FOOT, UNSPECIFIED TYPE (H): ICD-10-CM

## 2024-06-17 DIAGNOSIS — L97.426: ICD-10-CM

## 2024-06-17 DIAGNOSIS — E13.621: ICD-10-CM

## 2024-06-17 LAB
ANION GAP SERPL CALCULATED.3IONS-SCNC: 16 MMOL/L (ref 7–15)
BASOPHILS # BLD AUTO: 0 10E3/UL (ref 0–0.2)
BASOPHILS NFR BLD AUTO: 0 %
BUN SERPL-MCNC: 23.1 MG/DL (ref 8–23)
CALCIUM SERPL-MCNC: 8.5 MG/DL (ref 8.8–10.2)
CHLORIDE SERPL-SCNC: 103 MMOL/L (ref 98–107)
CREAT SERPL-MCNC: 1.18 MG/DL (ref 0.67–1.17)
CRP SERPL-MCNC: 60.32 MG/L
DEPRECATED HCO3 PLAS-SCNC: 20 MMOL/L (ref 22–29)
EGFRCR SERPLBLD CKD-EPI 2021: 70 ML/MIN/1.73M2
EOSINOPHIL # BLD AUTO: 0.3 10E3/UL (ref 0–0.7)
EOSINOPHIL NFR BLD AUTO: 4 %
ERYTHROCYTE [DISTWIDTH] IN BLOOD BY AUTOMATED COUNT: 13.7 % (ref 10–15)
ERYTHROCYTE [SEDIMENTATION RATE] IN BLOOD BY WESTERGREN METHOD: 49 MM/HR (ref 0–20)
GLUCOSE BLDC GLUCOMTR-MCNC: 118 MG/DL (ref 70–99)
GLUCOSE BLDC GLUCOMTR-MCNC: 118 MG/DL (ref 70–99)
GLUCOSE BLDC GLUCOMTR-MCNC: 142 MG/DL (ref 70–99)
GLUCOSE BLDC GLUCOMTR-MCNC: 181 MG/DL (ref 70–99)
GLUCOSE SERPL-MCNC: 85 MG/DL (ref 70–99)
HBA1C MFR BLD: 6.4 %
HCT VFR BLD AUTO: 33.7 % (ref 40–53)
HGB BLD-MCNC: 10.8 G/DL (ref 13.3–17.7)
HOLD SPECIMEN: NORMAL
IMM GRANULOCYTES # BLD: 0 10E3/UL
IMM GRANULOCYTES NFR BLD: 0 %
LACTATE SERPL-SCNC: 2.1 MMOL/L (ref 0.7–2)
LACTATE SERPL-SCNC: 2.4 MMOL/L (ref 0.7–2)
LYMPHOCYTES # BLD AUTO: 1.6 10E3/UL (ref 0.8–5.3)
LYMPHOCYTES NFR BLD AUTO: 24 %
MCH RBC QN AUTO: 28.6 PG (ref 26.5–33)
MCHC RBC AUTO-ENTMCNC: 32 G/DL (ref 31.5–36.5)
MCV RBC AUTO: 89 FL (ref 78–100)
MONOCYTES # BLD AUTO: 0.6 10E3/UL (ref 0–1.3)
MONOCYTES NFR BLD AUTO: 9 %
NEUTROPHILS # BLD AUTO: 4.1 10E3/UL (ref 1.6–8.3)
NEUTROPHILS NFR BLD AUTO: 62 %
NRBC # BLD AUTO: 0 10E3/UL
NRBC BLD AUTO-RTO: 0 /100
PLATELET # BLD AUTO: 205 10E3/UL (ref 150–450)
POTASSIUM SERPL-SCNC: 3.7 MMOL/L (ref 3.4–5.3)
RBC # BLD AUTO: 3.78 10E6/UL (ref 4.4–5.9)
SODIUM SERPL-SCNC: 139 MMOL/L (ref 135–145)
WBC # BLD AUTO: 6.7 10E3/UL (ref 4–11)

## 2024-06-17 PROCEDURE — 87040 BLOOD CULTURE FOR BACTERIA: CPT | Performed by: EMERGENCY MEDICINE

## 2024-06-17 PROCEDURE — 99207 PR NO BILLABLE SERVICE THIS VISIT: CPT | Performed by: INTERNAL MEDICINE

## 2024-06-17 PROCEDURE — 250N000012 HC RX MED GY IP 250 OP 636 PS 637: Performed by: INTERNAL MEDICINE

## 2024-06-17 PROCEDURE — 99253 IP/OBS CNSLTJ NEW/EST LOW 45: CPT | Mod: 57 | Performed by: PODIATRIST

## 2024-06-17 PROCEDURE — 85041 AUTOMATED RBC COUNT: CPT | Performed by: EMERGENCY MEDICINE

## 2024-06-17 PROCEDURE — 73630 X-RAY EXAM OF FOOT: CPT | Mod: LT

## 2024-06-17 PROCEDURE — 99285 EMERGENCY DEPT VISIT HI MDM: CPT | Mod: 25

## 2024-06-17 PROCEDURE — 99223 1ST HOSP IP/OBS HIGH 75: CPT | Mod: AI | Performed by: INTERNAL MEDICINE

## 2024-06-17 PROCEDURE — 258N000003 HC RX IP 258 OP 636: Mod: JZ | Performed by: INTERNAL MEDICINE

## 2024-06-17 PROCEDURE — 93971 EXTREMITY STUDY: CPT | Mod: LT

## 2024-06-17 PROCEDURE — 36415 COLL VENOUS BLD VENIPUNCTURE: CPT | Performed by: EMERGENCY MEDICINE

## 2024-06-17 PROCEDURE — 250N000013 HC RX MED GY IP 250 OP 250 PS 637: Performed by: INTERNAL MEDICINE

## 2024-06-17 PROCEDURE — 83036 HEMOGLOBIN GLYCOSYLATED A1C: CPT | Performed by: EMERGENCY MEDICINE

## 2024-06-17 PROCEDURE — 86140 C-REACTIVE PROTEIN: CPT | Performed by: EMERGENCY MEDICINE

## 2024-06-17 PROCEDURE — 80048 BASIC METABOLIC PNL TOTAL CA: CPT | Performed by: EMERGENCY MEDICINE

## 2024-06-17 PROCEDURE — 96365 THER/PROPH/DIAG IV INF INIT: CPT

## 2024-06-17 PROCEDURE — 85652 RBC SED RATE AUTOMATED: CPT | Performed by: EMERGENCY MEDICINE

## 2024-06-17 PROCEDURE — 250N000011 HC RX IP 250 OP 636: Mod: JZ | Performed by: INTERNAL MEDICINE

## 2024-06-17 PROCEDURE — 250N000011 HC RX IP 250 OP 636: Mod: JZ | Performed by: EMERGENCY MEDICINE

## 2024-06-17 PROCEDURE — 120N000001 HC R&B MED SURG/OB

## 2024-06-17 PROCEDURE — 83605 ASSAY OF LACTIC ACID: CPT | Performed by: EMERGENCY MEDICINE

## 2024-06-17 RX ORDER — POLYETHYLENE GLYCOL 3350 17 G/17G
17 POWDER, FOR SOLUTION ORAL 2 TIMES DAILY PRN
Status: DISCONTINUED | OUTPATIENT
Start: 2024-06-17 | End: 2024-06-21 | Stop reason: HOSPADM

## 2024-06-17 RX ORDER — FERROUS SULFATE 325(65) MG
325 TABLET ORAL
Status: DISCONTINUED | OUTPATIENT
Start: 2024-06-17 | End: 2024-06-21 | Stop reason: HOSPADM

## 2024-06-17 RX ORDER — AMLODIPINE BESYLATE 5 MG/1
5 TABLET ORAL 2 TIMES DAILY
Status: DISCONTINUED | OUTPATIENT
Start: 2024-06-17 | End: 2024-06-17

## 2024-06-17 RX ORDER — PROCHLORPERAZINE 25 MG
25 SUPPOSITORY, RECTAL RECTAL EVERY 12 HOURS PRN
Status: DISCONTINUED | OUTPATIENT
Start: 2024-06-17 | End: 2024-06-21 | Stop reason: HOSPADM

## 2024-06-17 RX ORDER — AMOXICILLIN 250 MG
2 CAPSULE ORAL 2 TIMES DAILY PRN
Status: DISCONTINUED | OUTPATIENT
Start: 2024-06-17 | End: 2024-06-21 | Stop reason: HOSPADM

## 2024-06-17 RX ORDER — HYDROMORPHONE HYDROCHLORIDE 2 MG/1
2 TABLET ORAL
Status: DISCONTINUED | OUTPATIENT
Start: 2024-06-17 | End: 2024-06-19

## 2024-06-17 RX ORDER — MAGNESIUM OXIDE 400 MG/1
400 TABLET ORAL 3 TIMES DAILY
COMMUNITY
End: 2024-07-28

## 2024-06-17 RX ORDER — ONDANSETRON 2 MG/ML
4 INJECTION INTRAMUSCULAR; INTRAVENOUS EVERY 6 HOURS PRN
Status: DISCONTINUED | OUTPATIENT
Start: 2024-06-17 | End: 2024-06-19

## 2024-06-17 RX ORDER — ONDANSETRON 4 MG/1
4 TABLET, ORALLY DISINTEGRATING ORAL EVERY 6 HOURS PRN
Status: DISCONTINUED | OUTPATIENT
Start: 2024-06-17 | End: 2024-06-19

## 2024-06-17 RX ORDER — PIPERACILLIN SODIUM, TAZOBACTAM SODIUM 4; .5 G/20ML; G/20ML
4.5 INJECTION, POWDER, LYOPHILIZED, FOR SOLUTION INTRAVENOUS ONCE
Status: COMPLETED | OUTPATIENT
Start: 2024-06-17 | End: 2024-06-17

## 2024-06-17 RX ORDER — LIDOCAINE 40 MG/G
CREAM TOPICAL
Status: DISCONTINUED | OUTPATIENT
Start: 2024-06-17 | End: 2024-06-21 | Stop reason: HOSPADM

## 2024-06-17 RX ORDER — VANCOMYCIN HYDROCHLORIDE 1 G/200ML
1000 INJECTION, SOLUTION INTRAVENOUS EVERY 12 HOURS
Status: DISCONTINUED | OUTPATIENT
Start: 2024-06-17 | End: 2024-06-19 | Stop reason: DRUGHIGH

## 2024-06-17 RX ORDER — NALOXONE HYDROCHLORIDE 0.4 MG/ML
0.4 INJECTION, SOLUTION INTRAMUSCULAR; INTRAVENOUS; SUBCUTANEOUS
Status: DISCONTINUED | OUTPATIENT
Start: 2024-06-17 | End: 2024-06-19

## 2024-06-17 RX ORDER — PROCHLORPERAZINE MALEATE 5 MG
10 TABLET ORAL EVERY 6 HOURS PRN
Status: DISCONTINUED | OUTPATIENT
Start: 2024-06-17 | End: 2024-06-19

## 2024-06-17 RX ORDER — DEXTROSE MONOHYDRATE 25 G/50ML
25-50 INJECTION, SOLUTION INTRAVENOUS
Status: DISCONTINUED | OUTPATIENT
Start: 2024-06-17 | End: 2024-06-21 | Stop reason: HOSPADM

## 2024-06-17 RX ORDER — AMOXICILLIN 250 MG
1 CAPSULE ORAL 2 TIMES DAILY PRN
Status: DISCONTINUED | OUTPATIENT
Start: 2024-06-17 | End: 2024-06-21 | Stop reason: HOSPADM

## 2024-06-17 RX ORDER — ACETAMINOPHEN 325 MG/1
650 TABLET ORAL EVERY 4 HOURS PRN
Status: DISCONTINUED | OUTPATIENT
Start: 2024-06-17 | End: 2024-06-18

## 2024-06-17 RX ORDER — FERROUS SULFATE 325(65) MG
325 TABLET ORAL
COMMUNITY

## 2024-06-17 RX ORDER — GABAPENTIN 400 MG/1
400 CAPSULE ORAL 3 TIMES DAILY
Status: DISCONTINUED | OUTPATIENT
Start: 2024-06-17 | End: 2024-06-21 | Stop reason: HOSPADM

## 2024-06-17 RX ORDER — NALOXONE HYDROCHLORIDE 0.4 MG/ML
0.2 INJECTION, SOLUTION INTRAMUSCULAR; INTRAVENOUS; SUBCUTANEOUS
Status: DISCONTINUED | OUTPATIENT
Start: 2024-06-17 | End: 2024-06-19

## 2024-06-17 RX ORDER — NICOTINE POLACRILEX 4 MG
15-30 LOZENGE BUCCAL
Status: DISCONTINUED | OUTPATIENT
Start: 2024-06-17 | End: 2024-06-21 | Stop reason: HOSPADM

## 2024-06-17 RX ORDER — LISINOPRIL 40 MG/1
40 TABLET ORAL DAILY
Status: DISCONTINUED | OUTPATIENT
Start: 2024-06-17 | End: 2024-06-21 | Stop reason: HOSPADM

## 2024-06-17 RX ORDER — PROPRANOLOL HYDROCHLORIDE 20 MG/1
20 TABLET ORAL 2 TIMES DAILY
Status: DISCONTINUED | OUTPATIENT
Start: 2024-06-17 | End: 2024-06-21 | Stop reason: HOSPADM

## 2024-06-17 RX ORDER — METFORMIN HCL 500 MG
1000 TABLET, EXTENDED RELEASE 24 HR ORAL 2 TIMES DAILY
Status: DISCONTINUED | OUTPATIENT
Start: 2024-06-17 | End: 2024-06-20

## 2024-06-17 RX ORDER — HYDROCHLOROTHIAZIDE 12.5 MG/1
12.5 CAPSULE ORAL EVERY MORNING
Status: DISCONTINUED | OUTPATIENT
Start: 2024-06-17 | End: 2024-06-21 | Stop reason: HOSPADM

## 2024-06-17 RX ORDER — PIPERACILLIN SODIUM, TAZOBACTAM SODIUM 4; .5 G/20ML; G/20ML
4.5 INJECTION, POWDER, LYOPHILIZED, FOR SOLUTION INTRAVENOUS EVERY 6 HOURS
Status: DISCONTINUED | OUTPATIENT
Start: 2024-06-17 | End: 2024-06-21

## 2024-06-17 RX ORDER — PANTOPRAZOLE SODIUM 40 MG/1
40 TABLET, DELAYED RELEASE ORAL
Status: DISCONTINUED | OUTPATIENT
Start: 2024-06-17 | End: 2024-06-21 | Stop reason: HOSPADM

## 2024-06-17 RX ORDER — CALCIUM CARBONATE 500 MG/1
1000 TABLET, CHEWABLE ORAL 4 TIMES DAILY PRN
Status: DISCONTINUED | OUTPATIENT
Start: 2024-06-17 | End: 2024-06-21 | Stop reason: HOSPADM

## 2024-06-17 RX ORDER — ACETAMINOPHEN 650 MG/1
650 SUPPOSITORY RECTAL EVERY 4 HOURS PRN
Status: DISCONTINUED | OUTPATIENT
Start: 2024-06-17 | End: 2024-06-21 | Stop reason: HOSPADM

## 2024-06-17 RX ORDER — AMLODIPINE BESYLATE 5 MG/1
10 TABLET ORAL DAILY
COMMUNITY
End: 2024-07-05

## 2024-06-17 RX ORDER — ATORVASTATIN CALCIUM 40 MG/1
40 TABLET, FILM COATED ORAL EVERY EVENING
Status: DISCONTINUED | OUTPATIENT
Start: 2024-06-17 | End: 2024-06-21 | Stop reason: HOSPADM

## 2024-06-17 RX ORDER — AMLODIPINE BESYLATE 10 MG/1
10 TABLET ORAL DAILY
Status: DISCONTINUED | OUTPATIENT
Start: 2024-06-17 | End: 2024-06-19

## 2024-06-17 RX ORDER — HYDROMORPHONE HYDROCHLORIDE 2 MG/1
2 TABLET ORAL EVERY 4 HOURS PRN
Status: DISCONTINUED | OUTPATIENT
Start: 2024-06-17 | End: 2024-06-17

## 2024-06-17 RX ADMIN — GABAPENTIN 400 MG: 400 CAPSULE ORAL at 13:42

## 2024-06-17 RX ADMIN — INSULIN DEGLUDEC 25 UNITS: 100 INJECTION, SOLUTION SUBCUTANEOUS at 10:18

## 2024-06-17 RX ADMIN — GABAPENTIN 400 MG: 400 CAPSULE ORAL at 10:17

## 2024-06-17 RX ADMIN — INSULIN ASPART 9 UNITS: 100 INJECTION, SOLUTION INTRAVENOUS; SUBCUTANEOUS at 18:58

## 2024-06-17 RX ADMIN — PIPERACILLIN AND TAZOBACTAM 4.5 G: 4; .5 INJECTION, POWDER, FOR SOLUTION INTRAVENOUS at 19:01

## 2024-06-17 RX ADMIN — INSULIN ASPART 7 UNITS: 100 INJECTION, SOLUTION INTRAVENOUS; SUBCUTANEOUS at 09:27

## 2024-06-17 RX ADMIN — VANCOMYCIN HYDROCHLORIDE 2500 MG: 5 INJECTION, POWDER, LYOPHILIZED, FOR SOLUTION INTRAVENOUS at 09:32

## 2024-06-17 RX ADMIN — INSULIN ASPART 9 UNITS: 100 INJECTION, SOLUTION INTRAVENOUS; SUBCUTANEOUS at 13:26

## 2024-06-17 RX ADMIN — VANCOMYCIN HYDROCHLORIDE 1000 MG: 1 INJECTION, SOLUTION INTRAVENOUS at 20:27

## 2024-06-17 RX ADMIN — PROPRANOLOL HYDROCHLORIDE 20 MG: 20 TABLET ORAL at 20:26

## 2024-06-17 RX ADMIN — GABAPENTIN 400 MG: 400 CAPSULE ORAL at 20:26

## 2024-06-17 RX ADMIN — FERROUS SULFATE TAB 325 MG (65 MG ELEMENTAL FE) 325 MG: 325 (65 FE) TAB at 10:17

## 2024-06-17 RX ADMIN — PANTOPRAZOLE SODIUM 40 MG: 40 TABLET, DELAYED RELEASE ORAL at 10:17

## 2024-06-17 RX ADMIN — ATORVASTATIN CALCIUM 40 MG: 40 TABLET, FILM COATED ORAL at 20:26

## 2024-06-17 RX ADMIN — AMLODIPINE BESYLATE 10 MG: 10 TABLET ORAL at 10:17

## 2024-06-17 RX ADMIN — PROPRANOLOL HYDROCHLORIDE 20 MG: 20 TABLET ORAL at 10:18

## 2024-06-17 RX ADMIN — PIPERACILLIN AND TAZOBACTAM 4.5 G: 4; .5 INJECTION, POWDER, FOR SOLUTION INTRAVENOUS at 13:44

## 2024-06-17 RX ADMIN — PIPERACILLIN AND TAZOBACTAM 4.5 G: 4; .5 INJECTION, POWDER, FOR SOLUTION INTRAVENOUS at 05:18

## 2024-06-17 ASSESSMENT — ACTIVITIES OF DAILY LIVING (ADL)
ADLS_ACUITY_SCORE: 37
ADLS_ACUITY_SCORE: 20
ADLS_ACUITY_SCORE: 20
DEPENDENT_IADLS:: INDEPENDENT
ADLS_ACUITY_SCORE: 35
ADLS_ACUITY_SCORE: 37
ADLS_ACUITY_SCORE: 20
ADLS_ACUITY_SCORE: 20
ADLS_ACUITY_SCORE: 37
ADLS_ACUITY_SCORE: 20
ADLS_ACUITY_SCORE: 37
ADLS_ACUITY_SCORE: 37
ADLS_ACUITY_SCORE: 20
ADLS_ACUITY_SCORE: 20
ADLS_ACUITY_SCORE: 37
ADLS_ACUITY_SCORE: 37

## 2024-06-17 ASSESSMENT — COLUMBIA-SUICIDE SEVERITY RATING SCALE - C-SSRS
6. HAVE YOU EVER DONE ANYTHING, STARTED TO DO ANYTHING, OR PREPARED TO DO ANYTHING TO END YOUR LIFE?: NO
1. IN THE PAST MONTH, HAVE YOU WISHED YOU WERE DEAD OR WISHED YOU COULD GO TO SLEEP AND NOT WAKE UP?: NO
2. HAVE YOU ACTUALLY HAD ANY THOUGHTS OF KILLING YOURSELF IN THE PAST MONTH?: NO

## 2024-06-17 NOTE — CONSULTS
Care Management Initial Consult    General Information  Assessment completed with: Patient,    Type of CM/SW Visit: Initial Assessment    Primary Care Provider verified and updated as needed: Yes   Readmission within the last 30 days: no previous admission in last 30 days      Reason for Consult: discharge planning, care coordination/care conference    Communication Assessment  Patient's communication style: spoken language (English or Bilingual)    Hearing Difficulty or Deaf: no   Wear Glasses or Blind: no    Cognitive  Cognitive/Neuro/Behavioral: WDL                      Living Environment:   People in home: spouse     Current living Arrangements: house      Able to return to prior arrangements: yes       Family/Social Support:  Care provided by: self  Provides care for: no one  Marital Status:   Wife          Description of Support System: Supportive, Involved    Support Assessment: Adequate family and caregiver support, Adequate social supports    Current Resources:   Patient receiving home care services: No     Community Resources: None  Equipment currently used at home: none  Supplies currently used at home:  none    Does the patient's insurance plan have a 3 day qualifying hospital stay waiver?  No    Lifestyle & Psychosocial Needs:  Social Determinants of Health     Food Insecurity: Low Risk  (1/31/2024)    Food Insecurity     Within the past 12 months, did you worry that your food would run out before you got money to buy more?: No     Within the past 12 months, did the food you bought just not last and you didn t have money to get more?: No   Depression: Not at risk (3/25/2024)    PHQ-2     PHQ-2 Score: 0   Housing Stability: Low Risk  (1/31/2024)    Housing Stability     Do you have housing? : Yes     Are you worried about losing your housing?: No   Tobacco Use: Low Risk  (5/30/2024)    Patient History     Smoking Tobacco Use: Never     Smokeless Tobacco Use: Never     Passive Exposure: Not on file    Financial Resource Strain: Low Risk  (1/31/2024)    Financial Resource Strain     Within the past 12 months, have you or your family members you live with been unable to get utilities (heat, electricity) when it was really needed?: No   Alcohol Use: Not At Risk (10/18/2023)    AUDIT-C     Frequency of Alcohol Consumption: Monthly or less     Average Number of Drinks: 1 or 2     Frequency of Binge Drinking: Never   Transportation Needs: Low Risk  (1/31/2024)    Transportation Needs     Within the past 12 months, has lack of transportation kept you from medical appointments, getting your medicines, non-medical meetings or appointments, work, or from getting things that you need?: No   Physical Activity: Unknown (1/31/2024)    Exercise Vital Sign     Days of Exercise per Week: 4 days     Minutes of Exercise per Session: Not on file   Interpersonal Safety: Low Risk  (1/31/2024)    Interpersonal Safety     Do you feel physically and emotionally safe where you currently live?: Yes     Within the past 12 months, have you been hit, slapped, kicked or otherwise physically hurt by someone?: No     Within the past 12 months, have you been humiliated or emotionally abused in other ways by your partner or ex-partner?: No   Stress: No Stress Concern Present (1/31/2024)    [place] [employer] - Occupational Stress Questionnaire     Feeling of Stress : Not at all   Social Connections: Moderately Integrated (1/31/2024)    Social Connection and Isolation Panel [NHANES]     Frequency of Communication with Friends and Family: Twice a week     Frequency of Social Gatherings with Friends and Family: Never     Attends Jew Services: More than 4 times per year     Active Member of Clubs or Organizations: Yes     Attends Club or Organization Meetings: More than 4 times per year     Marital Status:    Health Literacy: Not on file       Functional Status:  Prior to admission patient needed assistance:    Dependent ADLs:: Independent  Dependent IADLs:: Independent     Additional Information:  Care management consult for discharge planning. Patient with elevated unplanned rehospitalization risk score. Patient admitted for osteomyelitis of left foot, diabetic ulcer of left foot. He will be undergoing a left foot transmetatarsal amputation tomorrow. Met with patient at bedside and introduced role of care coordination. Patient independent at baseline and works as a  at a golf course. Lives with spouse. Spouse will transport at discharge.  Care management to follow for any discharge needs.     Kisha Smith RN  Care Coordinator  [facility]

## 2024-06-17 NOTE — ED PROVIDER NOTES
History   Chief Complaint:  Chief Complaint   Patient presents with    Wound Check       HPI       Crispin Rojas is a 64 year old male with a history of a nonhealing left foot diabetic ulcer who presents due to worsening of the ulcer now with exposed bone.  He first thought this might be the case Saturday night.  However then when he redressed it he was certain and therefore came in.  The last time he had bone exposed in an ulcer was biopsied and he had osteomyelitis.  He denies any fevers or chills but notes that his left ankle and lower leg are more swollen than usual.  Has new insoles for the foot as arranged through podiatry but this is not until June 27.  Feels his blood sugar control has been going well.  Notes that between April and November his control is better than in the winter.  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  About 5 hours into the shift he develops left foot pain.  This is unchanged.    Independent Historian: Patient    Review of External Notes: Per ED course     Medications:    amLODIPine (NORVASC) 5 MG tablet  amoxicillin-clavulanate (AUGMENTIN) 875-125 MG tablet  atorvastatin (LIPITOR) 40 MG tablet  blood glucose (NO BRAND SPECIFIED) lancets standard  calcium carbonate (OS-NARA) 500 MG tablet  Cholecalciferol (VITAMIN D3) 25 MCG (1000 UT) CAPS  Co-Enzyme Q10 100 MG CAPS  Continuous Blood Gluc Sensor (FREESTYLE LUCERO 14 DAY SENSOR) MISC  ELIQUIS ANTICOAGULANT 5 MG tablet  ferrous sulfate (FEROSUL) 325 (65 Fe) MG tablet  gabapentin (NEURONTIN) 100 MG capsule  gentamicin (GARAMYCIN) 0.1 % external ointment  hydrochlorothiazide (MICROZIDE) 12.5 MG capsule  Insulin Aspart FlexPen 100 UNIT/ML SOPN  insulin degludec (TRESIBA FLEXTOUCH) 100 UNIT/ML pen  insulin pen needle (B-D U/F) 31G X 5 MM miscellaneous  ketoconazole (NIZORAL) 2 % external shampoo  lisinopril (ZESTRIL) 40 MG tablet  MAGNESIUM GLYCINATE PLUS PO  metFORMIN (GLUCOPHAGE XR) 500 MG 24 hr  tablet  Multiple Vitamins-Minerals (MULTIVITAMIN PO)  mupirocin (BACTROBAN) 2 % external ointment  Omega-3 Fatty Acids (OMEGA-3 FISH OIL PO)  omeprazole (PRILOSEC) 40 MG DR capsule  propranolol (INDERAL) 20 MG tablet  Semaglutide, 2 MG/DOSE, (OZEMPIC) 8 MG/3ML pen  tadalafil (CIALIS) 5 MG tablet        Past Medical History/Problem List:    Past Medical History:   Diagnosis Date    Antiplatelet or antithrombotic long-term use     Ascending aorta dilatation (H24)     Cerebral artery occlusion with cerebral infarction (H)     Cerebral infarction (H)     Gastroesophageal reflux disease with esophagitis     H/O blood clots 2022    Hyperlipidemia LDL goal <70     Hypertension     Nonalcoholic steatohepatitis 11/30/2022    Numbness and tingling     Obesity     GILA (obstructive sleep apnea) 10/22/2018    PVD (peripheral vascular disease) (H24)     Renal stone     Tremor     Type 2 diabetes mellitus with diabetic polyneuropathy, with long-term current use of insulin (H)        Patient Active Problem List    Diagnosis Date Noted    Class 2 severe obesity due to excess calories with serious comorbidity in adult (H) 02/16/2024     Priority: Medium    Diabetic ulcer of left foot with fat layer exposed (H) 10/30/2023     Priority: Medium    Chronic foot ulcer with fat layer exposed, left (H) 09/25/2023     Priority: Medium    Liver cirrhosis secondary to SNYDER (H) 01/26/2023     Priority: Medium    Gastro-esophageal reflux disease with esophagitis 12/26/2022     Priority: Medium    Nonalcoholic steatohepatitis 11/30/2022     Priority: Medium    Essential tremor 10/03/2022     Priority: Medium    Chronic osteomyelitis of right foot (H) 08/18/2022     Priority: Medium    Acute osteomyelitis of right foot (H) 07/22/2022     Priority: Medium    Abnormal CT of liver 07/08/2022     Priority: Medium    Acute deep vein thrombosis (DVT) of tibial vein of right lower extremity (H) 07/06/2022     Priority: Medium    Osteomyelitis of great  "toe of right foot (H) 06/03/2022     Priority: Medium    Cervical pain 01/13/2022     Priority: Medium    Bilateral thoracic back pain 01/13/2022     Priority: Medium    Scoliosis of thoracic spine, unspecified scoliosis type 01/03/2022     Priority: Medium    PVD (peripheral vascular disease) (H24) 12/31/2021     Priority: Medium     s/p left partial toe amputation      Other sequelae following unspecified cerebrovascular disease 12/02/2021     Priority: Medium    Benign neoplasm of transverse colon 11/19/2021     Priority: Medium    Hemorrhoids 11/17/2021     Priority: Medium    Polyp of colon 11/17/2021     Priority: Medium    Duodenitis 10/15/2021     Priority: Medium    Diabetic ulcer of lower extremity (H) 11/07/2019     Priority: Medium    GILA (obstructive sleep apnea) 10/22/2018     Priority: Medium     HST 10/18/2018, AHI : 34.1      Right bundle branch block 06/23/2018     Priority: Medium     On ECG 6/23/2018, advised to f/u with PCP      Hyperlipidemia LDL goal <70 06/07/2018     Priority: Medium    Essential hypertension 06/07/2018     Priority: Medium    Otalgia of left ear 12/11/2017     Priority: Medium    Type 2 diabetes mellitus with peripheral neuropathy (H) 11/27/2017     Priority: Medium    Chronic left shoulder pain 01/30/2017     Priority: Medium    Calculus of kidney 08/30/2016     Priority: Medium    Shoulder pain 01/13/2015     Priority: Medium        Physical Exam   Patient Vitals for the past 24 hrs:   BP Temp Temp src Pulse Resp SpO2 Height Weight   06/17/24 0304 136/70 -- -- -- -- -- -- --   06/17/24 0301 -- 99  F (37.2  C) Temporal 82 18 99 % 1.753 m (5' 9\") 107.7 kg (237 lb 7 oz)      Resp:  Non-labored  Neuro:  Alert and cooperative  MSkel:  Moving all extremities  Skin:  Ulcer left foot, exposed bone, no surrounding cellulitis  CV:  LLE edema, asymmetric        Emergency Department Course     Imaging:    Foot XR, G/E 3 views, left   Final Result   IMPRESSION: Prior amputation of the " distal portion of the second metatarsal as well as prior amputation of the 1st digital at the metatarsal-phalangeal joint. Slight apparent worsening of lucent appearance of the 3rd metatarsal head, osteomyelitis not    entirely excluded. If clinically indicated consider further evaluation with MRI. Hammer toe deformities. Plantar surface calcaneal spur. Diffuse soft tissue swelling about the plantar soft tissues of the foot.      ?         US Lower Extremity Venous Duplex Left   Final Result   IMPRESSION:   1.  No deep venous thrombosis in the left lower extremity.           Laboratory:  Labs Ordered and Resulted from Time of ED Arrival to Time of ED Departure   BASIC METABOLIC PANEL - Abnormal       Result Value    Sodium 139      Potassium 3.7      Chloride 103      Carbon Dioxide (CO2) 20 (*)     Anion Gap 16 (*)     Urea Nitrogen 23.1 (*)     Creatinine 1.18 (*)     GFR Estimate 70      Calcium 8.5 (*)     Glucose 85     CRP INFLAMMATION - Abnormal    CRP Inflammation 60.32 (*)    ERYTHROCYTE SEDIMENTATION RATE AUTO - Abnormal    Erythrocyte Sedimentation Rate 49 (*)    HEMOGLOBIN A1C - Abnormal    Hemoglobin A1C 6.4 (*)    LACTIC ACID WHOLE BLOOD - Abnormal    Lactic Acid 2.1 (*)    CBC WITH PLATELETS AND DIFFERENTIAL - Abnormal    WBC Count 6.7      RBC Count 3.78 (*)     Hemoglobin 10.8 (*)     Hematocrit 33.7 (*)     MCV 89      MCH 28.6      MCHC 32.0      RDW 13.7      Platelet Count 205      % Neutrophils 62      % Lymphocytes 24      % Monocytes 9      % Eosinophils 4      % Basophils 0      % Immature Granulocytes 0      NRBCs per 100 WBC 0      Absolute Neutrophils 4.1      Absolute Lymphocytes 1.6      Absolute Monocytes 0.6      Absolute Eosinophils 0.3      Absolute Basophils 0.0      Absolute Immature Granulocytes 0.0      Absolute NRBCs 0.0     BLOOD CULTURE   BLOOD CULTURE   BLOOD CULTURE        Procedures:   No    Emergency Department Course:    Assessments/Consultations/Discussion of Management  or Tests :  ED Course as of 06/17/24 0636   Mon Jun 17, 2024   0314 Podiatry note from 5/20 reviewed.  Had ulcer with fat layer exposed.  Debrided.  Recommendations for gauze padding, new shoe inserts, 1 month follow-up.   4908 I spoke with Dr. Maguire, hospitalist, regarding admission       Independent Interpretation (X-rays, CTs, rhythm strip):  X-rays reviewed - abnormalities to distal foot, will defer to radiology for interpretation    Interventions:  Medications   piperacillin-tazobactam (ZOSYN) 4.5 g vial to attach to  mL bag (4.5 g Intravenous $New Bag 6/17/24 0518)        Social Determinants of Health affecting care:   No    Disposition:  Admit    Impression & Plan    Medical Decision Making:  Ulcer with exposed bone is very concerning for osteomyelitis and need for amputation to manage.  Given IV antibiotics based on past wound swab positive for pseudomonas (and others).  X-rays obtained were concerning as well.  Lactic just above normal with infection and without SIRS criteria to diagnose sepsis.  Blood cultures in process and started prior to antibiotic administration.  Admission arranged.    Diagnosis:    ICD-10-CM    1. Osteomyelitis of left foot, unspecified type (H)  M86.9       2. Diabetic ulcer of left midfoot associated with diabetes mellitus of other type, with bone involvement without evidence of necrosis (H)  E13.621     L97.426            MD Garrison Mueller, Ivette Morocho MD  06/17/24 0640

## 2024-06-17 NOTE — PHARMACY-ADMISSION MEDICATION HISTORY
Pharmacist Admission Medication History    Admission medication history is complete. The information provided in this note is only as accurate as the sources available at the time of the update.    Information Source(s): Patient and CareEverywhere/SureScripts via in-person    Pertinent Information:   For patients on insulin therapy:  Do you use sliding scale insulin based on blood sugars? Yes - carb counting (1 unit per 9 grams carbs)  Do you typically eat three meals a day? Yes  How many times do you check your blood glucose per day? CGM  How many episodes of hypoglycemia do you typically have per month? 4-5X per week (resolve quickly)    Patient's last dose of insulin degludec 6/16/24 with 45 units injected    Patient takes Augmentin prophylactically for diabetic foot ulcer    Changes made to PTA medication list:  Added: None  Deleted: Bactroban, Omega 3  Changed: amlodipine, ferrous sulfate, insulin aspart, insulin degludec, magnesium    Allergies reviewed with patient and updates made in EHR: yes    Medication History Completed By: Lorie Hughes PharmD 6/17/2024 9:37 AM    PTA Med List   Medication Sig Last Dose    amLODIPine (NORVASC) 5 MG tablet Take 10 mg by mouth daily 6/16/2024 at am    amoxicillin-clavulanate (AUGMENTIN) 875-125 MG tablet Take 1 tablet by mouth daily 6/16/2024 at am    atorvastatin (LIPITOR) 40 MG tablet Take 1 tablet (40 mg) by mouth daily Take one tablet daily SEE PROVIDER FOR NEXT REFILL 6/16/2024 at pm    calcium carbonate (OS-NARA) 500 MG tablet Take 1 tablet by mouth 2 times daily 6/16/2024 at pm    Cholecalciferol (VITAMIN D3) 25 MCG (1000 UT) CAPS Take 1,000 Units by mouth daily  6/16/2024 at am    Co-Enzyme Q10 100 MG CAPS Take 100 mg by mouth daily  6/16/2024 at am    ELIQUIS ANTICOAGULANT 5 MG tablet Take 1 tablet (5 mg) by mouth 2 times daily 6/16/2024 at pm    ferrous sulfate (FEROSUL) 325 (65 Fe) MG tablet Take 325 mg by mouth daily (with breakfast) 6/14/2024 at am     gabapentin (NEURONTIN) 100 MG capsule Take 400 mg by mouth 3 times daily 6/16/2024 at pm    gentamicin (GARAMYCIN) 0.1 % external ointment Apply 5g topically to wound with bandage changes. 6/16/2024 at pm    hydrochlorothiazide (MICROZIDE) 12.5 MG capsule Take 1 capsule (12.5 mg) by mouth every morning 6/16/2024 at am    insulin aspart (NOVOLOG PEN) 100 UNIT/ML pen Inject Subcutaneous 3 times daily (with meals) Inject 1 unit of insulin for every 9 grams of carbs 6/16/2024 at pm    insulin degludec (TRESIBA) 100 UNIT/ML pen Inject 35-55 Units Subcutaneous every morning 6/16/2024 at am    ketoconazole (NIZORAL) 2 % external shampoo Apply topically daily as needed for itching or irritation See MD after 6 weeks Past Month    lisinopril (ZESTRIL) 40 MG tablet Take 1 tablet (40 mg) by mouth daily 6/16/2024 at am    magnesium oxide (MAG-OX) 400 MG tablet Take 400 mg by mouth 3 times daily 6/16/2024 at pm    metFORMIN (GLUCOPHAGE XR) 500 MG 24 hr tablet TAKE 2 TABLETS(1000 MG) BY MOUTH TWICE DAILY 6/16/2024 at pm    Multiple Vitamins-Minerals (MULTIVITAMIN PO) Take 1 tablet by mouth daily  6/16/2024 at am    omeprazole (PRILOSEC) 40 MG DR capsule Take 40 mg by mouth daily 6/16/2024 at am    propranolol (INDERAL) 20 MG tablet Take 20 mg by mouth 2 times daily 6/16/2024 at am    Semaglutide, 2 MG/DOSE, (OZEMPIC) 8 MG/3ML pen Inject 2 mg Subcutaneous every 7 days 6/12/2024    tadalafil (CIALIS) 5 MG tablet TAKE 1 TABLET BY MOUTH EVERY DAY AS NEEDED one hour before intercourse Past Month

## 2024-06-17 NOTE — PROGRESS NOTES
Followed up on patient admitted by my colleague earlier today.  Mr. Rojas had no new complaints this afternoon.  Exam stable. Pain controlled.  Tolerating oral intake.  Podiatry has seen patient and recommended surgery tomorrow and he expressed understanding.    A/P:  Osteomyelitis:  -  Continue current IV abx + pain control  -  MRI foot pending  -  NPO after midnight, appreciate podiatry assistance    DM:  -  Reduce tresiba in AM to 5 units until after surgery.  Once diet resumed will escalate insulin back up quickly.    History of DVT: Chronically on Eliquis 5 mg twice daily.  -Hold Eliquis for anticipated surgery, resume as soon as podiatry feels reasonable post-op.

## 2024-06-17 NOTE — ED TRIAGE NOTES
Pt has diabetic foot ulcer on the ball of his left foot since October that in the past few weeks worsened, pt states there is now exposed bone. A&Ox4.

## 2024-06-17 NOTE — TELEPHONE ENCOUNTER
Crispin has exposed Bone in an existing Diabetic Foot Ulcer on the bottom of his Lt Foot.    - Bone protruding - ball of Lt foot, below 4th toe  - Round wound, greenish tint - not sure if this is new  - 1st noticed last night - thought initially that it wasn't a bone  - Lt foot is swollen - more than usual and not decreasing at night  - Wound drains daily - dresses wound daily  - Odor to wound & drainage    Advised to see HCP within 4 hours - Lennie Ludwig RN  [facility]      Reason for Disposition   [1] Foul-smelling odor from foot AND [2] new or increased    Additional Information   Negative: SEVERE pain   Negative: Foot is cool or blue in comparison to other side   Negative: [1] Looks infected (spreading redness, red streak, pus) AND [2] fever   Negative: Sounds like a life-threatening emergency to the triager   Negative: Patient sounds very sick or weak to the triager   Negative: Fever > 100.4 F (38.0 C)   Negative: [1] Drainage from foot AND [2] new or increased    Protocols used: Diabetes - Foot Problems and Ttujlqiak-X-SC

## 2024-06-17 NOTE — PHARMACY-VANCOMYCIN DOSING SERVICE
"Pharmacy Vancomycin Initial Note  Date of Service 2024  Patient's  1960  64 year old, male    Indication: Osteomyelitis    Current estimated CrCl = Estimated Creatinine Clearance: 78.5 mL/min (A) (based on SCr of 1.18 mg/dL (H)).    Creatinine for last 3 days  2024:  4:20 AM Creatinine 1.18 mg/dL    Recent Vancomycin Level(s) for last 3 days  No results found for requested labs within last 3 days.      Vancomycin IV Administrations (past 72 hours)        No vancomycin orders with administrations in past 72 hours.                    Nephrotoxins and other renal medications (From now, onward)      Start     Dose/Rate Route Frequency Ordered Stop    24 2030  vancomycin (VANCOCIN) 1,000 mg in 200 mL dextrose intermittent infusion         1,000 mg  200 mL/hr over 1 Hours Intravenous EVERY 12 HOURS 24 0826      24 1200  piperacillin-tazobactam (ZOSYN) 4.5 g vial to attach to  mL bag        Note to Pharmacy: For [facility], [facility] and [facility]: For Zosyn-naive patients, use the \"Zosyn initial dose + extended infusion\" order panel.    4.5 g  over 30 Minutes Intravenous EVERY 6 HOURS 24 0645      24 0830  vancomycin (VANCOCIN) 2,500 mg in 0.9% NaCl 500 mL intermittent infusion         2,500 mg  over 120 Minutes Intravenous ONCE 24 0826      24 0800  [Held by provider]  lisinopril (ZESTRIL) tablet 40 mg        (On hold since today at 0645 until manually unheld; held by Albert Maguire MDHold Reason: Transfer to a procedural area)   Note to Pharmacy: PTA Sig:Take 1 tablet (40 mg) by mouth daily      40 mg Oral DAILY 24 0645              Contrast Orders - past 72 hours (72h ago, onward)      None            InsightRX Prediction of Planned Initial Vancomycin Regimen  Loading dose: 2500 mg at 08:30 2024.  Regimen: 1000 mg IV every 12 hours.  Start time: 20:30 on 2024  Exposure target: AUC24 (range)400-600 mg/L.hr   AUC24,ss: 512 mg/L.hr  Probability " of AUC24 > 400: 75 %  Ctrough,ss: 17.2 mg/L  Probability of Ctrough,ss > 20: 36 %  Probability of nephrotoxicity (Lodise LYNN 2009): 13 %        Plan:  Start vancomycin 2500 mg IV x1 LD, then 1000 mg q12h.   Vancomycin monitoring method: AUC  Vancomycin therapeutic monitoring goal: 400-600 mg*h/L  Pharmacy will check vancomycin levels as appropriate in 1-3 Days.    Serum creatinine levels will be ordered daily for the first week of therapy and at least twice weekly for subsequent weeks.      [place], [facility]

## 2024-06-17 NOTE — PROGRESS NOTES
Patient  alert and oriented x 4, oriented to room and call system. Denies pain. reviewed welcome folder and pain/medication information packet with patient. Admission profile started. Tolerated diet. . SBA in room. Lactic acid 2.4 @ 06:51- provider notified. Will continue monitoring.

## 2024-06-17 NOTE — CONSULTS
[place] [facility]  CONSULTATION NOTE    CHIEF COMPLAINT:      I was asked by Albert Maguire MD  to evaluate this patient for left foot infection.    PATIENT HISTORY:  Crispin Rojas is a 64 year old male  with a past medical history significant for what's listed below, presented for a worsening left foot infection. He's known to the podiatry clinic for several foot infections and surgeries. This ulcer has been of several months in duration. States he's been working and golfing a lot. States he knew it was getting worse when he could tell it was worsening in depth and had some tenderness and swelling to leg. States feels well otherwise. Denies N/F/V/C/D.         Review of Systems:  A 10 point review of systems was performed and is positive for that noted above in the patient history.  All other areas are negative.     PAST MEDICAL HISTORY:   Past Medical History:   Diagnosis Date    Antiplatelet or antithrombotic long-term use     Ascending aorta dilatation (H24)     Cerebral artery occlusion with cerebral infarction (H)     Cerebral infarction (H)     2010    Gastroesophageal reflux disease with esophagitis     H/O blood clots 2022    Hyperlipidemia LDL goal <70     Hypertension     Nonalcoholic steatohepatitis 11/30/2022    Numbness and tingling     Obesity     GILA (obstructive sleep apnea) 10/22/2018    CPAP intolerant    PVD (peripheral vascular disease) (H24)     s/p left partial toe amputation    Renal stone     Tremor     Type 2 diabetes mellitus with diabetic polyneuropathy, with long-term current use of insulin (H)         PAST SURGICAL HISTORY:   Past Surgical History:   Procedure Laterality Date    AMPUTATE FOOT Left 8/18/2016    Procedure: AMPUTATE FOOT;  Surgeon: Jack Younger DPM;  Location: [facility]    AMPUTATE TOE(S) Right 2/1/2016    Procedure: AMPUTATE TOE(S);  Surgeon: Rachelle Manriquez DPM, Pod;  Location: [facility]    AMPUTATE TOE(S) Right 8/2/2019    Procedure: Right  fourth toe partial amputation for treatment of osteomyelitis.;  Surgeon: Jack Younger DPM;  Location: [facility]    AMPUTATE TOE(S) Left 11/8/2019    Procedure: Left second toe amputation at the metatarsophalangeal joint.;  Surgeon: Rachelle Manriquez DPM, Podiatry/Foot and Ankle Surgery;  Location: [facility]    AMPUTATE TOE(S) Right 6/5/2022    Procedure: AMPUTATION OF RIGHT GREAT TOE;  Surgeon: Wili Quiles DPM;  Location: [facility]    AMPUTATE TOE(S) Right 7/28/2022    Procedure: Right foot partial first metatarsal resection;  Surgeon: Tiny Soto DPM;  Location: [facility]    ANGIOGRAM      BIOPSY BONE FOOT Right 7/24/2022    Procedure: Bone biopsy, right first metatarsal.;  Surgeon: Valentino Molina DPM;  Location: [facility]    COLONOSCOPY      COMBINED CYSTOSCOPY, RETROGRADES, URETEROSCOPY, INSERT STENT Left 8/21/2016    Procedure: COMBINED CYSTOSCOPY, RETROGRADES, URETEROSCOPY, INSERT STENT;  Surgeon: Artemio Valenzuela MD;  Location: [facility]    COMBINED CYSTOSCOPY, RETROGRADES, URETEROSCOPY, LASER HOLMIUM LITHOTRIPSY URETER(S), INSERT STENT Left 10/3/2016    Procedure: COMBINED CYSTOSCOPY, RETROGRADES, URETEROSCOPY, LASER HOLMIUM LITHOTRIPSY URETER(S), INSERT STENT;  Surgeon: Artemio Valenzuela MD;  Location: [facility]    EXTRACORPOREAL SHOCK WAVE LITHOTRIPSY (ESWL) Left 9/8/2016    Procedure: EXTRACORPOREAL SHOCK WAVE LITHOTRIPSY (ESWL);  Surgeon: Artemio Valenzuela MD;  Location: [facility]    INCISION AND DRAINAGE FOOT, COMBINED Right 7/24/2022    Procedure: Incision and drainage, right foot ;  Surgeon: Valentino Molina DPM;  Location: [facility]    IRRIGATION AND DEBRIDEMENT FOOT, COMBINED Left 10/19/2023    Procedure: Left foot second metatarsal resection , . Left plantar ulcer excisional debridement, down to and including tendon, with closure, site measuring 4 cm x 2 cm x 1 cm;  Surgeon: Tiny Soto DPM;  Location: [facility]    OSTEOTOMY FOOT Right 11/30/2022    Procedure: 1. Right second  metatarsal floating osteotomy  2. Right second digit proximal phalanx arthroplasty 3. Right percutaneous flexor tenotomy;  Surgeon: Tiny Soto DPM;  Location: [facility]    OSTEOTOMY FOOT Right 1/19/2023    Procedure: Right foot third metatarsal head excision, ulcer debridement, matatarsal osteotomies;  Surgeon: Tiny Soto DPM;  Location: [facility]    RECESSION GASTROCNEMIUS Right 2/1/2016    Procedure: RECESSION GASTROCNEMIUS;  Surgeon: Rachelle Manriquez DPM, Pod;  Location: [facility]        MEDICATIONS:  Reviewed in Epic. Current.     ALLERGIES:    Allergies   Allergen Reactions    Statins Swelling     Other reaction(s): Other (see comments)  SIMCOR caused edema in extremities      Bactrim [Sulfamethoxazole-Trimethoprim] Nausea and Vomiting    Sulfa Antibiotics Nausea    Sulfatolamide Nausea and Vomiting    Niacin Swelling     SIMCOR caused edema in extremities    Simvastatin Swelling     SIMCOR caused edema in extremities        SOCIAL HISTORY:   Social History     Socioeconomic History    Marital status:      Spouse name: Sydney    Number of children: 2    Years of education: Not on file    Highest education level: Master's degree (e.g., MA, MS, [place], MEd, MSW, ELIANA)   Occupational History    Occupation: marketing     Employer: [employer]     Comment: [employer]   Tobacco Use    Smoking status: Never    Smokeless tobacco: Never   Vaping Use    Vaping status: Never Used   Substance and Sexual Activity    Alcohol use: Yes     Comment: rare---red wine 3x per month    Drug use: Never    Sexual activity: Not Currently     Partners: Female   Other Topics Concern    Parent/sibling w/ CABG, MI or angioplasty before 65F 55M? No   Social History Narrative    Not on file     Social Determinants of Health     Financial Resource Strain: Low Risk  (1/31/2024)    Financial Resource Strain     Within the past 12 months, have you or your family members you live with been unable to get utilities (heat, electricity) when  it was really needed?: No   Food Insecurity: Low Risk  (1/31/2024)    Food Insecurity     Within the past 12 months, did you worry that your food would run out before you got money to buy more?: No     Within the past 12 months, did the food you bought just not last and you didn t have money to get more?: No   Transportation Needs: Low Risk  (1/31/2024)    Transportation Needs     Within the past 12 months, has lack of transportation kept you from medical appointments, getting your medicines, non-medical meetings or appointments, work, or from getting things that you need?: No   Physical Activity: Unknown (1/31/2024)    Exercise Vital Sign     Days of Exercise per Week: 4 days     Minutes of Exercise per Session: Not on file   Stress: No Stress Concern Present (1/31/2024)    [place] [employer] - Occupational Stress Questionnaire     Feeling of Stress : Not at all   Social Connections: Moderately Integrated (1/31/2024)    Social Connection and Isolation Panel [NHANES]     Frequency of Communication with Friends and Family: Twice a week     Frequency of Social Gatherings with Friends and Family: Never     Attends Sikhism Services: More than 4 times per year     Active Member of Clubs or Organizations: Yes     Attends Club or Organization Meetings: More than 4 times per year     Marital Status:    Interpersonal Safety: Low Risk  (1/31/2024)    Interpersonal Safety     Do you feel physically and emotionally safe where you currently live?: Yes     Within the past 12 months, have you been hit, slapped, kicked or otherwise physically hurt by someone?: No     Within the past 12 months, have you been humiliated or emotionally abused in other ways by your partner or ex-partner?: No   Housing Stability: Low Risk  (1/31/2024)    Housing Stability     Do you have housing? : Yes     Are you worried about losing your housing?: No        FAMILY HISTORY:   Family History   Problem Relation Age of Onset  "   Arthritis Mother         SLE    Connective Tissue Disorder Mother         lupus    Diabetes Mother     Cerebrovascular Disease Mother     Cancer Mother     Diabetes Father     Coronary Artery Disease Father     Chronic Obstructive Pulmonary Disease Father     Diabetes Sister     Diabetes Maternal Grandfather         EXAM:Vitals: /60   Pulse 57   Temp 98  F (36.7  C) (Temporal)   Resp 18   Ht 1.753 m (5' 9\")   Wt 107.7 kg (237 lb 7 oz)   SpO2 98%   BMI 35.06 kg/m    BMI= Body mass index is 35.06 kg/m .    LABS:     Last Comprehensive Metabolic Panel:  Sodium   Date Value Ref Range Status   06/17/2024 139 135 - 145 mmol/L Final     Comment:     Reference intervals for this test were updated on 09/26/2023 to more accurately reflect our healthy population. There may be differences in the flagging of prior results with similar values performed with this method. Interpretation of those prior results can be made in the context of the updated reference intervals.    05/10/2021 139 133 - 144 mmol/L Final     Potassium   Date Value Ref Range Status   06/17/2024 3.7 3.4 - 5.3 mmol/L Final   07/26/2022 4.4 3.4 - 5.3 mmol/L Final   05/10/2021 3.6 3.4 - 5.3 mmol/L Final     Chloride   Date Value Ref Range Status   06/17/2024 103 98 - 107 mmol/L Final   07/25/2022 109 94 - 109 mmol/L Final   05/10/2021 108 94 - 109 mmol/L Final     Carbon Dioxide   Date Value Ref Range Status   05/10/2021 27 20 - 32 mmol/L Final     Carbon Dioxide (CO2)   Date Value Ref Range Status   06/17/2024 20 (L) 22 - 29 mmol/L Final   07/25/2022 25 20 - 32 mmol/L Final     Anion Gap   Date Value Ref Range Status   06/17/2024 16 (H) 7 - 15 mmol/L Final   07/25/2022 7 3 - 14 mmol/L Final   05/10/2021 4 3 - 14 mmol/L Final     Glucose   Date Value Ref Range Status   06/17/2024 85 70 - 99 mg/dL Final   07/25/2022 107 (H) 70 - 99 mg/dL Final   05/10/2021 68 (L) 70 - 99 mg/dL Final     GLUCOSE BY METER POCT   Date Value Ref Range Status "   06/17/2024 118 (H) 70 - 99 mg/dL Final     Urea Nitrogen   Date Value Ref Range Status   06/17/2024 23.1 (H) 8.0 - 23.0 mg/dL Final   07/25/2022 16 7 - 30 mg/dL Final   05/10/2021 15 7 - 30 mg/dL Final     Creatinine   Date Value Ref Range Status   06/17/2024 1.18 (H) 0.67 - 1.17 mg/dL Final   05/10/2021 0.86 0.66 - 1.25 mg/dL Final     GFR Estimate   Date Value Ref Range Status   06/17/2024 70 >60 mL/min/1.73m2 Final     Comment:     eGFR calculated using 2021 CKD-EPI equation.   05/10/2021 >90 >60 mL/min/[1.73_m2] Final     Comment:     Non  GFR Calc  Starting 12/18/2018, serum creatinine based estimated GFR (eGFR) will be   calculated using the Chronic Kidney Disease Epidemiology Collaboration   (CKD-EPI) equation.       Calcium   Date Value Ref Range Status   06/17/2024 8.5 (L) 8.8 - 10.2 mg/dL Final   05/10/2021 8.5 8.5 - 10.1 mg/dL Final     Lab Results   Component Value Date    WBC 6.7 06/17/2024    WBC 6.5 05/10/2021     Lab Results   Component Value Date    RBC 3.78 06/17/2024    RBC 4.14 05/10/2021     Lab Results   Component Value Date    HGB 10.8 06/17/2024    HGB 12.1 05/10/2021     Lab Results   Component Value Date    HCT 33.7 06/17/2024    HCT 37.8 05/10/2021     Lab Results   Component Value Date     06/17/2024     05/10/2021      Lab Results   Component Value Date    INR 1.08 11/07/2019    INR 1.04 08/19/2013        General appearance: Patient is alert and fully cooperative with history & exam.  No sign of distress is noted during the visit.      Respiratory: Breathing is regular & unlabored while sitting.      HEENT: Hearing is intact to spoken word.  Speech is clear.  No gross evidence of visual impairment that would impact ambulation.      Dermatologic: Left foot: submet 3 ulcer, probes to bone. Noted sanguinous drainage from site. Edema present to foot and ankle. No cellulitis. Minimal tenderness to site.      Vascular: Dorsalis pedis and posterior tibial pulses  "are intact & regular bilaterally.  CFT and skin temperature is normal to both lower extremities.       Neurologic: Lower extremity sensation is diminished, bilateral foot, to light touch.  No evidence of neurological-based weakness or contracture in the lower extremities.       Musculoskeletal: Patient is ambulatory without an assistive device or brace. S/p left hallux amputation, second ray resection.    Psychiatric: Affect is pleasant & appropriate.      All cultures:  No results for input(s): \"CULTURE\" in the last 168 hours.     IMAGING:   IMPRESSION: Prior amputation of the distal portion of the second metatarsal as well as prior amputation of the 1st digital at the metatarsal-phalangeal joint. Slight apparent worsening of lucent appearance of the 3rd metatarsal head, osteomyelitis not   entirely excluded. If clinically indicated consider further evaluation with MRI. Hammer toe deformities. Plantar surface calcaneal spur. Diffuse soft tissue swelling about the plantar soft tissues of the foot.    ASSESSMENT:  Left foot submet 3 ulcer, concern for osteomyelitis to third metatarsal head   Hx of several b/l digital amptuations  Diabetes Mellitus 6.4%     MEDICAL DECISION MAKING:   -Discussed all findings with patient. Chart and imaging reviewed.   -Left foot with clinical findings of osteomyelitis to thrid metatarsal head, also present on xray. An MRI is ordered and pending.   -Discussed with him need for removal of the infected site. Because he's had other amputations to the same foot, removing just this site would leave him only with the 1st metatarsal, the 4th and 5th. Given this, recommendation was made for a transmetatarsal amputation, to rebalance the pressure placed on these bones and hopefully prevent future transfer lesions. Other option presented was for partial third ray resection, which could leave him at risk for transfer lesions under the 4th digit. He understands and agrees these options.   -Will " plan surgery for tomorrow. Likely plan for left foot transmetatarsal amputation.   -Cont IV abx   -NPO after midnight. Will place hold orders for eliquis         Thank you for the consultation request and the opportunity to participate in the care of Crispin Soto DPM   [place] [facility]le [facility]  [phone]

## 2024-06-17 NOTE — PLAN OF CARE
"Goal Outcome Evaluation:      Plan of Care Reviewed With: patient    Overall Patient Progress: no changeOverall Patient Progress: no change    Outcome Evaluation: Plan MRI today, and surgery tommorrow.    Patient is A/O X4. VSS, on RA. Denies pain. Tolerated diet. BG monitored- 118/118. MRI ordered. Independent in room. On IV Abx. Podiatry is following. Plan surgery tomorrow.     Problem: Adult Inpatient Plan of Care  Goal: Plan of Care Review  Description: The Plan of Care Review/Shift note should be completed every shift.  The Outcome Evaluation is a brief statement about your assessment that the patient is improving, declining, or no change.  This information will be displayed automatically on your shift  note.  Outcome: Progressing  Flowsheets (Taken 6/17/2024 1407)  Outcome Evaluation: Plan MRI today, and surgery tommorrow.  Plan of Care Reviewed With: patient  Overall Patient Progress: no change  Goal: Patient-Specific Goal (Individualized)  Description: You can add care plan individualizations to a care plan. Examples of Individualization might be:  \"Parent requests to be called daily at 9am for status\", \"I have a hard time hearing out of my right ear\", or \"Do not touch me to wake me up as it startles  me\".  Outcome: Progressing  Goal: Absence of Hospital-Acquired Illness or Injury  Outcome: Progressing  Intervention: Identify and Manage Fall Risk  Recent Flowsheet Documentation  Taken 6/17/2024 1200 by Lidya Watkins, RN  Safety Promotion/Fall Prevention:   room near nurse's station   supervised activity   safety round/check completed   clutter free environment maintained   lighting adjusted   nonskid shoes/slippers when out of bed   patient and family education  Intervention: Prevent Skin Injury  Recent Flowsheet Documentation  Taken 6/17/2024 1200 by Lidya Watkins, RN  Body Position: position changed independently  Skin Protection: adhesive use limited  Intervention: Prevent and Manage VTE (Venous " Thromboembolism) Risk  Recent Flowsheet Documentation  Taken 6/17/2024 1200 by Lidya Watkins RN  VTE Prevention/Management:   SCDs (sequential compression devices) off   patient refused intervention  Intervention: Prevent Infection  Recent Flowsheet Documentation  Taken 6/17/2024 1200 by Lidya Watkins RN  Infection Prevention:   cohorting utilized   hand hygiene promoted   rest/sleep promoted   single patient room provided  Goal: Optimal Comfort and Wellbeing  Outcome: Progressing  Goal: Readiness for Transition of Care  Outcome: Progressing     Problem: Wound  Goal: Optimal Coping  Outcome: Progressing  Goal: Optimal Functional Ability  Outcome: Progressing  Intervention: Optimize Functional Ability  Recent Flowsheet Documentation  Taken 6/17/2024 1200 by Lidya Watkins RN  Activity Management:   activity adjusted per tolerance   activity encouraged  Activity Assistance Provided: independent  Goal: Absence of Infection Signs and Symptoms  Outcome: Progressing  Goal: Improved Oral Intake  Outcome: Progressing  Goal: Optimal Pain Control and Function  Outcome: Progressing  Goal: Skin Health and Integrity  Outcome: Progressing  Intervention: Optimize Skin Protection  Recent Flowsheet Documentation  Taken 6/17/2024 1200 by Lidya Watkins RN  Skin Protection: adhesive use limited  Activity Management:   activity adjusted per tolerance   activity encouraged  Goal: Optimal Wound Healing  Outcome: Progressing     Problem: Comorbidity Management  Goal: Blood Glucose Levels Within Targeted Range  Outcome: Progressing  Intervention: Monitor and Manage Glycemia  Recent Flowsheet Documentation  Taken 6/17/2024 1200 by Lidya Watkins RN  Medication Review/Management: medications reviewed   .

## 2024-06-17 NOTE — ED NOTES
"[facility]  ED Nurse Handoff Report    ED Chief complaint: Wound Check  . ED Diagnosis:   Final diagnoses:   None       Allergies:   Allergies   Allergen Reactions    Statins Swelling     Other reaction(s): Other (see comments)  SIMCOR caused edema in extremities      Bactrim [Sulfamethoxazole-Trimethoprim] Nausea and Vomiting    Sulfa Antibiotics Nausea    Sulfatolamide Nausea and Vomiting    Niacin Swelling     SIMCOR caused edema in extremities    Simvastatin Swelling     SIMCOR caused edema in extremities       Code Status: Full Code    Activity level - Baseline/Home:  independent.  Activity Level - Current:    stand by .   Lift room needed: No.   Bariatric: No   Needed: No   Isolation: No.   Infection: Not Applicable.     Respiratory status: Room air    Vital Signs (within 30 minutes):   Vitals:    06/17/24 0301 06/17/24 0304   BP:  136/70   Pulse: 82    Resp: 18    Temp: 99  F (37.2  C)    TempSrc: Temporal    SpO2: 99%    Weight: 107.7 kg (237 lb 7 oz)    Height: 1.753 m (5' 9\")        Cardiac Rhythm:  ,      Pain level:    Patient confused: No.   Patient Falls Risk: patient and family education.   Elimination Status: Has voided     Patient Report - Initial Complaint: Wound Check .   Focused Assessment: Crispin Rojas is a 64 year old male with a history of a nonhealing left foot diabetic ulcer who presents due to worsening of the ulcer now with exposed bone.  He first thought this might be the case Saturday night.  However then when he redressed it he was certain and therefore came in.  The last time he had bone exposed in an ulcer was biopsied and he had osteomyelitis.  He denies any fevers or chills but notes that his left ankle and lower leg are more swollen than usual.  Has new insoles for the foot as arranged through podiatry but this is not until June 27.  Feels his blood sugar control has been going well.  Notes that between April and November his control is better " than in the winter.  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  About 5 hours into the shift he develops left foot pain.  This is unchanged.      Abnormal Results:   Labs Ordered and Resulted from Time of ED Arrival to Time of ED Departure - No data to display     Foot XR, G/E 3 views, left    (Results Pending)   US Lower Extremity Venous Duplex Left    (Results Pending)       Treatments provided: see MAR  Family Comments: n/a  OBS brochure/video discussed/provided to patient:  Yes  ED Medications:   Medications   piperacillin-tazobactam (ZOSYN) 4.5 g vial to attach to  mL bag (has no administration in time range)       Drips infusing:  Yes  For the majority of the shift this patient was Green.   Interventions performed were n/a.    Sepsis treatment initiated: No    Cares/treatment/interventions/medications to be completed following ED care: wound care    ED Nurse Name: Michelle Haider RN  4:31 AM  RECEIVING UNIT ED HANDOFF REVIEW    Above ED Nurse Handoff Report was reviewed: Yes  Reviewed by: Woodrow Gonsalez RN on June 17, 2024 at 6:46 AM   I Ryan called the ED to inform them the note was read: Yes

## 2024-06-18 ENCOUNTER — APPOINTMENT (OUTPATIENT)
Dept: GENERAL RADIOLOGY | Facility: CLINIC | Age: 64
End: 2024-06-18
Attending: PODIATRIST
Payer: COMMERCIAL

## 2024-06-18 ENCOUNTER — ANESTHESIA EVENT (OUTPATIENT)
Dept: SURGERY | Facility: CLINIC | Age: 64
End: 2024-06-18
Payer: COMMERCIAL

## 2024-06-18 ENCOUNTER — ANESTHESIA (OUTPATIENT)
Dept: SURGERY | Facility: CLINIC | Age: 64
End: 2024-06-18
Payer: COMMERCIAL

## 2024-06-18 ENCOUNTER — APPOINTMENT (OUTPATIENT)
Dept: MRI IMAGING | Facility: CLINIC | Age: 64
End: 2024-06-18
Attending: INTERNAL MEDICINE
Payer: COMMERCIAL

## 2024-06-18 LAB
ANION GAP SERPL CALCULATED.3IONS-SCNC: 12 MMOL/L (ref 7–15)
BACTERIA SPEC CULT: NORMAL
BUN SERPL-MCNC: 16.7 MG/DL (ref 8–23)
CALCIUM SERPL-MCNC: 9 MG/DL (ref 8.8–10.2)
CHLORIDE SERPL-SCNC: 104 MMOL/L (ref 98–107)
CREAT SERPL-MCNC: 0.94 MG/DL (ref 0.67–1.17)
DEPRECATED HCO3 PLAS-SCNC: 23 MMOL/L (ref 22–29)
EGFRCR SERPLBLD CKD-EPI 2021: >90 ML/MIN/1.73M2
ERYTHROCYTE [DISTWIDTH] IN BLOOD BY AUTOMATED COUNT: 13.3 % (ref 10–15)
GLUCOSE BLDC GLUCOMTR-MCNC: 110 MG/DL (ref 70–99)
GLUCOSE BLDC GLUCOMTR-MCNC: 116 MG/DL (ref 70–99)
GLUCOSE BLDC GLUCOMTR-MCNC: 142 MG/DL (ref 70–99)
GLUCOSE BLDC GLUCOMTR-MCNC: 170 MG/DL (ref 70–99)
GLUCOSE BLDC GLUCOMTR-MCNC: 216 MG/DL (ref 70–99)
GLUCOSE SERPL-MCNC: 171 MG/DL (ref 70–99)
GRAM STAIN RESULT: NORMAL
GRAM STAIN RESULT: NORMAL
HCT VFR BLD AUTO: 36 % (ref 40–53)
HGB BLD-MCNC: 11.5 G/DL (ref 13.3–17.7)
MCH RBC QN AUTO: 28 PG (ref 26.5–33)
MCHC RBC AUTO-ENTMCNC: 31.9 G/DL (ref 31.5–36.5)
MCV RBC AUTO: 88 FL (ref 78–100)
PLATELET # BLD AUTO: 176 10E3/UL (ref 150–450)
POTASSIUM SERPL-SCNC: 4.4 MMOL/L (ref 3.4–5.3)
RBC # BLD AUTO: 4.11 10E6/UL (ref 4.4–5.9)
SODIUM SERPL-SCNC: 139 MMOL/L (ref 135–145)
WBC # BLD AUTO: 4.2 10E3/UL (ref 4–11)

## 2024-06-18 PROCEDURE — 250N000011 HC RX IP 250 OP 636: Performed by: NURSE ANESTHETIST, CERTIFIED REGISTERED

## 2024-06-18 PROCEDURE — 88304 TISSUE EXAM BY PATHOLOGIST: CPT | Mod: TC | Performed by: PODIATRIST

## 2024-06-18 PROCEDURE — 0Y6N0ZB DETACHMENT AT LEFT FOOT, PARTIAL 2ND RAY, OPEN APPROACH: ICD-10-PCS | Performed by: PODIATRIST

## 2024-06-18 PROCEDURE — 370N000017 HC ANESTHESIA TECHNICAL FEE, PER MIN: Performed by: PODIATRIST

## 2024-06-18 PROCEDURE — 88304 TISSUE EXAM BY PATHOLOGIST: CPT | Mod: 26 | Performed by: PATHOLOGY

## 2024-06-18 PROCEDURE — 88311 DECALCIFY TISSUE: CPT | Mod: TC | Performed by: PODIATRIST

## 2024-06-18 PROCEDURE — 0Y6N0ZC DETACHMENT AT LEFT FOOT, PARTIAL 3RD RAY, OPEN APPROACH: ICD-10-PCS | Performed by: PODIATRIST

## 2024-06-18 PROCEDURE — 250N000011 HC RX IP 250 OP 636: Mod: JZ | Performed by: INTERNAL MEDICINE

## 2024-06-18 PROCEDURE — 250N000009 HC RX 250: Performed by: NURSE ANESTHETIST, CERTIFIED REGISTERED

## 2024-06-18 PROCEDURE — 710N000012 HC RECOVERY PHASE 2, PER MINUTE: Performed by: PODIATRIST

## 2024-06-18 PROCEDURE — 0Y6N0ZD DETACHMENT AT LEFT FOOT, PARTIAL 4TH RAY, OPEN APPROACH: ICD-10-PCS | Performed by: PODIATRIST

## 2024-06-18 PROCEDURE — L4361 PNEUMA/VAC WALK BOOT PRE OTS: HCPCS

## 2024-06-18 PROCEDURE — 73718 MRI LOWER EXTREMITY W/O DYE: CPT | Mod: 26 | Performed by: RADIOLOGY

## 2024-06-18 PROCEDURE — 0Y6N0ZF DETACHMENT AT LEFT FOOT, PARTIAL 5TH RAY, OPEN APPROACH: ICD-10-PCS | Performed by: PODIATRIST

## 2024-06-18 PROCEDURE — 250N000009 HC RX 250: Performed by: PODIATRIST

## 2024-06-18 PROCEDURE — 0Y6N0Z9 DETACHMENT AT LEFT FOOT, PARTIAL 1ST RAY, OPEN APPROACH: ICD-10-PCS | Performed by: PODIATRIST

## 2024-06-18 PROCEDURE — 28805 AMPUTATION THRU METATARSAL: CPT | Mod: LT | Performed by: PODIATRIST

## 2024-06-18 PROCEDURE — 999N000065 XR FOOT PORT LEFT 2 VIEWS: Mod: LT

## 2024-06-18 PROCEDURE — 258N000003 HC RX IP 258 OP 636: Mod: JZ | Performed by: NURSE ANESTHETIST, CERTIFIED REGISTERED

## 2024-06-18 PROCEDURE — 85027 COMPLETE CBC AUTOMATED: CPT | Performed by: INTERNAL MEDICINE

## 2024-06-18 PROCEDURE — 88307 TISSUE EXAM BY PATHOLOGIST: CPT | Mod: 26 | Performed by: PATHOLOGY

## 2024-06-18 PROCEDURE — 250N000011 HC RX IP 250 OP 636: Mod: JZ | Performed by: PODIATRIST

## 2024-06-18 PROCEDURE — 87070 CULTURE OTHR SPECIMN AEROBIC: CPT | Performed by: PODIATRIST

## 2024-06-18 PROCEDURE — 999N000141 HC STATISTIC PRE-PROCEDURE NURSING ASSESSMENT: Performed by: PODIATRIST

## 2024-06-18 PROCEDURE — 87075 CULTR BACTERIA EXCEPT BLOOD: CPT | Performed by: PODIATRIST

## 2024-06-18 PROCEDURE — 87205 SMEAR GRAM STAIN: CPT | Performed by: PODIATRIST

## 2024-06-18 PROCEDURE — 120N000001 HC R&B MED SURG/OB

## 2024-06-18 PROCEDURE — 99233 SBSQ HOSP IP/OBS HIGH 50: CPT | Performed by: INTERNAL MEDICINE

## 2024-06-18 PROCEDURE — 360N000076 HC SURGERY LEVEL 3, PER MIN: Performed by: PODIATRIST

## 2024-06-18 PROCEDURE — 80048 BASIC METABOLIC PNL TOTAL CA: CPT | Performed by: INTERNAL MEDICINE

## 2024-06-18 PROCEDURE — 272N000001 HC OR GENERAL SUPPLY STERILE: Performed by: PODIATRIST

## 2024-06-18 PROCEDURE — 36415 COLL VENOUS BLD VENIPUNCTURE: CPT | Performed by: INTERNAL MEDICINE

## 2024-06-18 PROCEDURE — 87186 SC STD MICRODIL/AGAR DIL: CPT | Performed by: PODIATRIST

## 2024-06-18 PROCEDURE — 250N000013 HC RX MED GY IP 250 OP 250 PS 637: Performed by: PODIATRIST

## 2024-06-18 PROCEDURE — 88311 DECALCIFY TISSUE: CPT | Mod: 26 | Performed by: PATHOLOGY

## 2024-06-18 PROCEDURE — 73718 MRI LOWER EXTREMITY W/O DYE: CPT | Mod: LT

## 2024-06-18 PROCEDURE — 250N000013 HC RX MED GY IP 250 OP 250 PS 637: Performed by: INTERNAL MEDICINE

## 2024-06-18 RX ORDER — SODIUM CHLORIDE, SODIUM LACTATE, POTASSIUM CHLORIDE, CALCIUM CHLORIDE 600; 310; 30; 20 MG/100ML; MG/100ML; MG/100ML; MG/100ML
INJECTION, SOLUTION INTRAVENOUS CONTINUOUS PRN
Status: DISCONTINUED | OUTPATIENT
Start: 2024-06-18 | End: 2024-06-18

## 2024-06-18 RX ORDER — SODIUM CHLORIDE, SODIUM LACTATE, POTASSIUM CHLORIDE, CALCIUM CHLORIDE 600; 310; 30; 20 MG/100ML; MG/100ML; MG/100ML; MG/100ML
INJECTION, SOLUTION INTRAVENOUS CONTINUOUS
Status: DISCONTINUED | OUTPATIENT
Start: 2024-06-18 | End: 2024-06-18 | Stop reason: HOSPADM

## 2024-06-18 RX ORDER — ONDANSETRON 2 MG/ML
4 INJECTION INTRAMUSCULAR; INTRAVENOUS EVERY 30 MIN PRN
Status: DISCONTINUED | OUTPATIENT
Start: 2024-06-18 | End: 2024-06-18 | Stop reason: HOSPADM

## 2024-06-18 RX ORDER — ONDANSETRON 4 MG/1
4 TABLET, ORALLY DISINTEGRATING ORAL EVERY 30 MIN PRN
Status: DISCONTINUED | OUTPATIENT
Start: 2024-06-18 | End: 2024-06-18 | Stop reason: HOSPADM

## 2024-06-18 RX ORDER — MAGNESIUM HYDROXIDE 1200 MG/15ML
LIQUID ORAL PRN
Status: DISCONTINUED | OUTPATIENT
Start: 2024-06-18 | End: 2024-06-18 | Stop reason: HOSPADM

## 2024-06-18 RX ORDER — BISACODYL 10 MG
10 SUPPOSITORY, RECTAL RECTAL DAILY PRN
Status: DISCONTINUED | OUTPATIENT
Start: 2024-06-21 | End: 2024-06-21 | Stop reason: HOSPADM

## 2024-06-18 RX ORDER — BUPIVACAINE HYDROCHLORIDE 5 MG/ML
INJECTION, SOLUTION EPIDURAL; INTRACAUDAL PRN
Status: DISCONTINUED | OUTPATIENT
Start: 2024-06-18 | End: 2024-06-18 | Stop reason: HOSPADM

## 2024-06-18 RX ORDER — ACETAMINOPHEN 325 MG/1
650 TABLET ORAL EVERY 4 HOURS PRN
Status: DISCONTINUED | OUTPATIENT
Start: 2024-06-21 | End: 2024-06-21 | Stop reason: HOSPADM

## 2024-06-18 RX ORDER — POLYETHYLENE GLYCOL 3350 17 G/17G
17 POWDER, FOR SOLUTION ORAL DAILY
Status: DISCONTINUED | OUTPATIENT
Start: 2024-06-19 | End: 2024-06-21 | Stop reason: HOSPADM

## 2024-06-18 RX ORDER — PROPOFOL 10 MG/ML
INJECTION, EMULSION INTRAVENOUS PRN
Status: DISCONTINUED | OUTPATIENT
Start: 2024-06-18 | End: 2024-06-18

## 2024-06-18 RX ORDER — PROPOFOL 10 MG/ML
INJECTION, EMULSION INTRAVENOUS CONTINUOUS PRN
Status: DISCONTINUED | OUTPATIENT
Start: 2024-06-18 | End: 2024-06-18

## 2024-06-18 RX ORDER — LIDOCAINE 40 MG/G
CREAM TOPICAL
Status: DISCONTINUED | OUTPATIENT
Start: 2024-06-18 | End: 2024-06-21 | Stop reason: HOSPADM

## 2024-06-18 RX ORDER — ONDANSETRON 4 MG/1
4 TABLET, ORALLY DISINTEGRATING ORAL EVERY 6 HOURS PRN
Status: DISCONTINUED | OUTPATIENT
Start: 2024-06-18 | End: 2024-06-21 | Stop reason: HOSPADM

## 2024-06-18 RX ORDER — NALOXONE HYDROCHLORIDE 0.4 MG/ML
0.1 INJECTION, SOLUTION INTRAMUSCULAR; INTRAVENOUS; SUBCUTANEOUS
Status: DISCONTINUED | OUTPATIENT
Start: 2024-06-18 | End: 2024-06-18 | Stop reason: HOSPADM

## 2024-06-18 RX ORDER — ACETAMINOPHEN 325 MG/1
975 TABLET ORAL
Status: DISCONTINUED | OUTPATIENT
Start: 2024-06-18 | End: 2024-06-18 | Stop reason: HOSPADM

## 2024-06-18 RX ORDER — AMOXICILLIN 250 MG
1 CAPSULE ORAL 2 TIMES DAILY
Status: DISCONTINUED | OUTPATIENT
Start: 2024-06-18 | End: 2024-06-21 | Stop reason: HOSPADM

## 2024-06-18 RX ORDER — DEXAMETHASONE SODIUM PHOSPHATE 4 MG/ML
4 INJECTION, SOLUTION INTRA-ARTICULAR; INTRALESIONAL; INTRAMUSCULAR; INTRAVENOUS; SOFT TISSUE
Status: DISCONTINUED | OUTPATIENT
Start: 2024-06-18 | End: 2024-06-18 | Stop reason: HOSPADM

## 2024-06-18 RX ORDER — PROCHLORPERAZINE MALEATE 5 MG
10 TABLET ORAL EVERY 6 HOURS PRN
Status: DISCONTINUED | OUTPATIENT
Start: 2024-06-18 | End: 2024-06-21 | Stop reason: HOSPADM

## 2024-06-18 RX ORDER — LIDOCAINE 40 MG/G
CREAM TOPICAL
Status: DISCONTINUED | OUTPATIENT
Start: 2024-06-18 | End: 2024-06-18 | Stop reason: HOSPADM

## 2024-06-18 RX ORDER — ONDANSETRON 2 MG/ML
4 INJECTION INTRAMUSCULAR; INTRAVENOUS EVERY 6 HOURS PRN
Status: DISCONTINUED | OUTPATIENT
Start: 2024-06-18 | End: 2024-06-21 | Stop reason: HOSPADM

## 2024-06-18 RX ORDER — ACETAMINOPHEN 325 MG/1
975 TABLET ORAL EVERY 8 HOURS
Status: COMPLETED | OUTPATIENT
Start: 2024-06-18 | End: 2024-06-21

## 2024-06-18 RX ORDER — KETAMINE HYDROCHLORIDE 10 MG/ML
INJECTION INTRAMUSCULAR; INTRAVENOUS PRN
Status: DISCONTINUED | OUTPATIENT
Start: 2024-06-18 | End: 2024-06-18

## 2024-06-18 RX ADMIN — PIPERACILLIN AND TAZOBACTAM 4.5 G: 4; .5 INJECTION, POWDER, FOR SOLUTION INTRAVENOUS at 17:38

## 2024-06-18 RX ADMIN — PROPOFOL 50 MG: 10 INJECTION, EMULSION INTRAVENOUS at 13:02

## 2024-06-18 RX ADMIN — PROPRANOLOL HYDROCHLORIDE 20 MG: 20 TABLET ORAL at 21:14

## 2024-06-18 RX ADMIN — HYDROMORPHONE HYDROCHLORIDE 1 MG: 2 TABLET ORAL at 21:19

## 2024-06-18 RX ADMIN — PANTOPRAZOLE SODIUM 40 MG: 40 TABLET, DELAYED RELEASE ORAL at 10:21

## 2024-06-18 RX ADMIN — Medication 20 MG: at 13:09

## 2024-06-18 RX ADMIN — PIPERACILLIN AND TAZOBACTAM 4.5 G: 4; .5 INJECTION, POWDER, FOR SOLUTION INTRAVENOUS at 12:48

## 2024-06-18 RX ADMIN — SODIUM CHLORIDE, POTASSIUM CHLORIDE, SODIUM LACTATE AND CALCIUM CHLORIDE: 600; 310; 30; 20 INJECTION, SOLUTION INTRAVENOUS at 11:50

## 2024-06-18 RX ADMIN — GABAPENTIN 400 MG: 400 CAPSULE ORAL at 21:14

## 2024-06-18 RX ADMIN — AMLODIPINE BESYLATE 10 MG: 10 TABLET ORAL at 10:21

## 2024-06-18 RX ADMIN — VANCOMYCIN HYDROCHLORIDE 1000 MG: 1 INJECTION, SOLUTION INTRAVENOUS at 10:27

## 2024-06-18 RX ADMIN — PROPOFOL 100 MCG/KG/MIN: 10 INJECTION, EMULSION INTRAVENOUS at 13:02

## 2024-06-18 RX ADMIN — ATORVASTATIN CALCIUM 40 MG: 40 TABLET, FILM COATED ORAL at 21:14

## 2024-06-18 RX ADMIN — ACETAMINOPHEN 975 MG: 325 TABLET, FILM COATED ORAL at 16:56

## 2024-06-18 RX ADMIN — PROPRANOLOL HYDROCHLORIDE 20 MG: 20 TABLET ORAL at 10:21

## 2024-06-18 RX ADMIN — VANCOMYCIN HYDROCHLORIDE 1000 MG: 1 INJECTION, SOLUTION INTRAVENOUS at 21:15

## 2024-06-18 RX ADMIN — PIPERACILLIN AND TAZOBACTAM 4.5 G: 4; .5 INJECTION, POWDER, FOR SOLUTION INTRAVENOUS at 00:20

## 2024-06-18 RX ADMIN — GABAPENTIN 400 MG: 400 CAPSULE ORAL at 10:21

## 2024-06-18 RX ADMIN — PROPOFOL 40 MG: 10 INJECTION, EMULSION INTRAVENOUS at 13:24

## 2024-06-18 RX ADMIN — PIPERACILLIN AND TAZOBACTAM 4.5 G: 4; .5 INJECTION, POWDER, FOR SOLUTION INTRAVENOUS at 06:05

## 2024-06-18 ASSESSMENT — ACTIVITIES OF DAILY LIVING (ADL)
ADLS_ACUITY_SCORE: 21
ADLS_ACUITY_SCORE: 20
ADLS_ACUITY_SCORE: 21
ADLS_ACUITY_SCORE: 20
ADLS_ACUITY_SCORE: 21
ADLS_ACUITY_SCORE: 20
ADLS_ACUITY_SCORE: 21
ADLS_ACUITY_SCORE: 20
ADLS_ACUITY_SCORE: 20

## 2024-06-18 NOTE — ANESTHESIA CARE TRANSFER NOTE
Patient: Crispin Rojas    Procedure: Procedure(s):  Left foot transmetatarsal amputation       Diagnosis: Osteomyelitis of left foot, unspecified type (H) [M86.9]  Diagnosis Additional Information: No value filed.    Anesthesia Type:   MAC     Note:    Oropharynx: spontaneously breathing and nasal airway in place  Level of Consciousness: awake  Oxygen Supplementation: room air    Independent Airway: airway patency satisfactory and stable  Dentition: dentition unchanged  Vital Signs Stable: post-procedure vital signs reviewed and stable  Report to RN Given: handoff report given  Patient transferred to: PACU    Handoff Report: Identifed the Patient, Identified the Reponsible Provider, Reviewed the pertinent medical history, Discussed the surgical course, Reviewed Intra-OP anesthesia mangement and issues during anesthesia, Set expectations for post-procedure period and Allowed opportunity for questions and acknowledgement of understanding      Vitals:  Vitals Value Taken Time   BP     Temp     Pulse     Resp     SpO2         Electronically Signed By: SARAH Barrett CRNA  June 18, 2024  2:29 PM

## 2024-06-18 NOTE — PLAN OF CARE
"Goal Outcome Evaluation:    Patient alert, oriented x 4 and in no acute distress. Independent. RA. Denies pain. Left foot wrapped. Abx given. NPO since MN. MRI foot pending. I & D. Declined CHG bath tonight, prefers to have it this morning. Patient questions insulin orders. MD paged. Will continue POC.     /62   Pulse 59   Temp 97.8  F (36.6  C) (Temporal)   Resp 16   Ht 1.753 m (5' 9\")   Wt 107.7 kg (237 lb 7 oz)   SpO2 96%   BMI 35.06 kg/m        Plan of Care Reviewed With: patient    Overall Patient Progress: improvingOverall Patient Progress: improving    Outcome Evaluation: Plan for NPO tonight and surgery scheduled for tomorrow at 12pm      Problem: Adult Inpatient Plan of Care  Goal: Plan of Care Review  Description: The Plan of Care Review/Shift note should be completed every shift.  The Outcome Evaluation is a brief statement about your assessment that the patient is improving, declining, or no change.  This information will be displayed automatically on your shift  note.  Outcome: Progressing  Flowsheets (Taken 6/17/2024 2141)  Outcome Evaluation: Plan for NPO tonight and surgery scheduled for tomorrow at 12pm  Plan of Care Reviewed With: patient  Overall Patient Progress: improving  Goal: Patient-Specific Goal (Individualized)  Description: You can add care plan individualizations to a care plan. Examples of Individualization might be:  \"Parent requests to be called daily at 9am for status\", \"I have a hard time hearing out of my right ear\", or \"Do not touch me to wake me up as it startles  me\".  Outcome: Progressing  Goal: Absence of Hospital-Acquired Illness or Injury  Outcome: Progressing  Intervention: Identify and Manage Fall Risk  Recent Flowsheet Documentation  Taken 6/17/2024 1600 by Yadira Harry RN  Safety Promotion/Fall Prevention:   room near nurse's station   supervised activity   safety round/check completed   clutter free environment maintained   lighting adjusted   nonskid " shoes/slippers when out of bed   patient and family education  Intervention: Prevent Skin Injury  Recent Flowsheet Documentation  Taken 6/17/2024 1600 by Yadira Harry RN  Body Position: position changed independently  Skin Protection: adhesive use limited  Intervention: Prevent and Manage VTE (Venous Thromboembolism) Risk  Recent Flowsheet Documentation  Taken 6/17/2024 1600 by Yadira Harry RN  VTE Prevention/Management:   SCDs (sequential compression devices) off   patient refused intervention  Intervention: Prevent Infection  Recent Flowsheet Documentation  Taken 6/17/2024 1600 by Yadira Harry RN  Infection Prevention:   cohorting utilized   hand hygiene promoted   rest/sleep promoted   single patient room provided  Goal: Optimal Comfort and Wellbeing  Outcome: Progressing  Goal: Readiness for Transition of Care  Outcome: Progressing     Problem: Wound  Goal: Optimal Coping  Outcome: Progressing  Goal: Optimal Functional Ability  Outcome: Progressing  Intervention: Optimize Functional Ability  Recent Flowsheet Documentation  Taken 6/17/2024 1600 by Yadira Harry RN  Activity Management:   activity adjusted per tolerance   activity encouraged  Activity Assistance Provided: independent  Goal: Absence of Infection Signs and Symptoms  Outcome: Progressing  Goal: Improved Oral Intake  Outcome: Progressing  Goal: Optimal Pain Control and Function  Outcome: Progressing  Goal: Skin Health and Integrity  Outcome: Progressing  Intervention: Optimize Skin Protection  Recent Flowsheet Documentation  Taken 6/17/2024 1600 by Yadira Harry RN  Skin Protection: adhesive use limited  Activity Management:   activity adjusted per tolerance   activity encouraged  Head of Bed (HOB) Positioning: HOB at 20-30 degrees  Goal: Optimal Wound Healing  Outcome: Progressing     Problem: Comorbidity Management  Goal: Blood Glucose Levels Within Targeted Range  Outcome: Progressing  Intervention: Monitor and Manage Glycemia  Recent  Flowsheet Documentation  Taken 6/17/2024 1600 by Yadira Harry, RN  Medication Review/Management: medications reviewed

## 2024-06-18 NOTE — PROGRESS NOTES
[facility]    Medicine Progress Note - Hospitalist Service    Date of Admission:  6/17/2024    Assessment & Plan   Chronic left diabetic foot ulcer  Left foot osteomyelitis:   Summary:  History of previous diabetic foot ulcers requiring multiple toe amputations.  Has had a left plantar foot ulcer in the area of the third metatarsal head for the last 8 months that has become much deeper with intermittent blood in purulent drainage over the last 6 to 8 weeks since he stopped going to the wound care clinic he says at the advice of his podiatrist.  There is a picture in Dr. Ji's note that shows out deep the ulcer goes likely to the bone.  There is significant purulent fluid coming out. Further eval suggested osteomyelitis and podiatry consulted.  They recommended surgery which occurred earlier today.    -  Continue zosyn + vancomycin.  Consider narrowing soon pending culture results.  -  Pain control  -  Will defer to podiatry foot/surgical site cares     History of DVT: Chronically on Eliquis 5 mg twice daily.  -Hold Eliquis until POD#1 per podiatry recommendation (see op note).     IDDM type II  Polyneuropathy: A1c 6.4.  He takes Ozempic weekly on Wednesdays, metformin XR 1000 mg twice daily, Tresiba usually 35-50 units in the morning.  He also uses 1 unit/9gm carb count with meals.  He has neuropathy, but does have some sensation in his feet.  -  Patient refused partial dose Tresiba this AM.   Now back from surgery and eating regular diet (he does not want diabetic diet).  He still wants Tresiba today.  Given time will give 20 units this afternoon and then increase to 35 units tomorrow.  It should be noted he plans to eat still afternoon and evening meal today.   Also adjusted carb count at his request from 1.5 U/15gm to his home 1 unit/9 gm.  Otherwise continue sliding scale insulin.  -Hold metformin due to elevated lactic acid earlier in stay and hold Ozempic (consider resuming if cannot  "discharge soon)  -Resume PTA gabapentin 400 mg 3 times daily     CKD stage II: Creatinine baseline is likely 1-1.1 and he is 1.8 on admission.  -BMP tomorrow     HTN  Essential tremor: He is on lisinopril 40 mg daily, HCTZ 12.5 mg daily, amlodipine 5 mg twice daily, and propranolol 20 mg daily.  -Hold lisinopril and hydrochlorothiazide today, consider resuming tomorrow if BP higher.  Continue amlodipine and propranolol.     History CVA  PAD  HLD: History of lacunar stroke in 2010.  He also has peripheral chill disease and hyperlipidemia.  He is on Eliquis for previous DVT in addition to atorvastatin 40 mg daily.  -Holding Eliquis, resume soon as noted above  -Atorvastatin     GILA: CPAP      Obesity: BMI 35, complicates care.     GERD: Substituted pantoprazole 40 mg daily for PTA omeprazole.     Chronic anemia: Hemoglobin at baseline of 10-11.     SNYDER    PPE Used:  Mask          Diet: Advance Diet as Tolerated: Regular Diet Adult    DVT Prophylaxis: DOAC on hold for surgery, resume as soon as podiatry feels reasonable (they felt should be held until at least POD#1).  PCD's as able until resume DOAC.  Mccarthy Catheter: Not present  Lines: None     Cardiac Monitoring: None  Code Status: Full Code      Clinically Significant Risk Factors                  # Hypertension: Noted on problem list              # Obesity: Estimated body mass index is 35.06 kg/m  as calculated from the following:    Height as of this encounter: 1.753 m (5' 9\").    Weight as of this encounter: 107.7 kg (237 lb 7 oz)., PRESENT ON ADMISSION            Disposition Plan     Medically Ready for Discharge: Anticipated in 2-4 Days     Jeremy Valdes DO  Hospitalist Service  [facility]  Securely message with Ryan (more info)  Text page via [email or web address] Paging/Directory   ______________________________________________________________________    Interval History   No new complaints, feels surgery went reasonably.  Wants more insulin " than yesterday.      Physical Exam   Vital Signs: Temp: 97.2  F (36.2  C) Temp src: Temporal BP: 127/66 Pulse: 54   Resp: 12 SpO2: 96 % O2 Device: None (Room air)    Weight: 237 lbs 6.97 oz    GEN:  Alert, oriented x 3, appears comfortable, no overt distress.  HEENT:  Normocephalic/atraumatic, no scleral icterus, no nasal discharge, mouth moist.  CV:  Regular rate and rhythm, no murmur or JVD.  S1 + S2 noted, no S3 or S4.  LUNGS:  Clear to auscultation bilaterally without rales/rhonchi/wheezing/retractions.  Symmetric chest rise on inhalation noted.  ABD:  Active bowel sounds, soft, non-tender/non-distended.  No guarding/rigidity.  EXT:  Left leg still edematous toward ankle, surgical site dressed.  Trace RLE edema.    SKIN:  Dry to touch, no new exanthems noted in the visualized areas.    Medical Decision Making       Over 50 MINUTES SPENT BY ME on the date of service doing chart review, history, exam, documentation & further activities per the note.      Data   Medications   Current Facility-Administered Medications   Medication Dose Route Frequency Provider Last Rate Last Admin     Current Facility-Administered Medications   Medication Dose Route Frequency Provider Last Rate Last Admin    acetaminophen (TYLENOL) tablet 975 mg  975 mg Oral Q8H Tiny Soto DPM   975 mg at 06/18/24 1656    amLODIPine (NORVASC) tablet 10 mg  10 mg Oral Daily Jeremy Valdes DO   10 mg at 06/18/24 1021    [Held by provider] apixaban ANTICOAGULANT (ELIQUIS) tablet 5 mg  5 mg Oral BID Albert Maguire MD        atorvastatin (LIPITOR) tablet 40 mg  40 mg Oral QPM Albert Maguire MD   40 mg at 06/17/24 2026    ferrous sulfate (FEROSUL) tablet 325 mg  325 mg Oral Q Mon Wed Fri AM Albert Maguire MD   325 mg at 06/17/24 1017    gabapentin (NEURONTIN) capsule 400 mg  400 mg Oral TID Albert Maguire MD   400 mg at 06/18/24 1021    [Held by provider] hydrochlorothiazide (MICROZIDE) capsule 12.5 mg  12.5 mg  Oral QAM Albert Maguire MD        insulin aspart (NovoLOG) injection (RAPID ACTING)   Subcutaneous TID w/meals Jeremy Valdes DO   10 Units at 06/18/24 1700    insulin aspart (NovoLOG) injection (RAPID ACTING)  1-7 Units Subcutaneous TID AC Albert Maguire MD   1 Units at 06/17/24 1858    insulin aspart (NovoLOG) injection (RAPID ACTING)  1-5 Units Subcutaneous At Bedtime Albert Maguire MD        [Held by provider] insulin degludec (TRESIBA) 100 UNIT/ML injection 25 Units  25 Units Subcutaneous QAM AC Albert Maguire MD   25 Units at 06/17/24 1018    [START ON 6/19/2024] insulin degludec (TRESIBA) 100 UNIT/ML injection 35 Units  35 Units Subcutaneous QAM Jeremy Valdes DO        [Held by provider] lisinopril (ZESTRIL) tablet 40 mg  40 mg Oral Daily Albert Maguire MD        [Held by provider] metFORMIN (GLUCOPHAGE XR) 24 hr tablet 1,000 mg  1,000 mg Oral BID Albert Maguire MD        pantoprazole (PROTONIX) EC tablet 40 mg  40 mg Oral QAM  Albert Maguire MD   40 mg at 06/18/24 1021    piperacillin-tazobactam (ZOSYN) 4.5 g vial to attach to  mL bag  4.5 g Intravenous Q6H Albert Maguire MD   4.5 g at 06/18/24 1248    [START ON 6/19/2024] polyethylene glycol (MIRALAX) Packet 17 g  17 g Oral Daily Tiny Soto DPM        propranolol (INDERAL) tablet 20 mg  20 mg Oral BID Albert Maguire MD   20 mg at 06/18/24 1021    senna-docusate (SENOKOT-S/PERICOLACE) 8.6-50 MG per tablet 1 tablet  1 tablet Oral BID Tiny Soto DPM        sodium chloride (PF) 0.9% PF flush 3 mL  3 mL Intracatheter Q8H Tiny Soto DPM        sodium chloride (PF) 0.9% PF flush 3 mL  3 mL Intracatheter Q8H Albert Maguire MD   3 mL at 06/17/24 1344    vancomycin (VANCOCIN) 1,000 mg in 200 mL dextrose intermittent infusion  1,000 mg Intravenous Q12H Albert Maguire  mL/hr at 06/18/24 1027 1,000 mg at 06/18/24 1027     Labs and Imaging  results below reviewed today.  Recent Labs   Lab 06/18/24  0623 06/17/24  0420   WBC 4.2 6.7   HGB 11.5* 10.8*   HCT 36.0* 33.7*   MCV 88 89    205     7-Day Micro Results       Collected Updated Procedure Result Status      06/18/2024 1354 06/18/2024 1424 Anaerobic Bacterial Culture Routine [[id number]]   Tissue from Foot, Left    In process Component Value   No component results               06/18/2024 1354 06/18/2024 1556 Gram Stain [[id number]]   Tissue from Foot, Left    Final result Component Value   GS Culture See corresponding culture for results   Gram Stain Result No organisms seen   Gram Stain Result No white blood cells seen            06/18/2024 1354 06/18/2024 1424 Tissue Aerobic Bacterial Culture Routine [[id number]]   Tissue from Foot, Left    In process Component Value   No component results               06/17/2024 0522 06/18/2024 0846 Blood Culture Peripheral Blood [[id number]]   Peripheral Blood    Preliminary result Component Value   Culture No growth after 1 day  [P]                06/17/2024 0420 06/18/2024 0846 Blood Culture Peripheral Blood [[id number]]   Peripheral Blood    Preliminary result Component Value   Culture No growth after 1 day  [P]                      Recent Labs   Lab 06/18/24  1612 06/18/24  1429 06/18/24  1110 06/18/24  0623 06/17/24  0820 06/17/24  0420   NA  --   --   --  139  --  139   POTASSIUM  --   --   --  4.4  --  3.7   CHLORIDE  --   --   --  104  --  103   CO2  --   --   --  23  --  20*   ANIONGAP  --   --   --  12  --  16*   * 116* 170* 171*   < > 85   BUN  --   --   --  16.7  --  23.1*   CR  --   --   --  0.94  --  1.18*   GFRESTIMATED  --   --   --  >90  --  70   NARA  --   --   --  9.0  --  8.5*    < > = values in this interval not displayed.     Recent Results (from the past 24 hour(s))   MR Foot Left w/o Contrast    Narrative    Exam: MRI of the left forefoot and midfoot dated 6/18/2024.    COMPARISON: 6/17/2024.    CLINICAL HISTORY:  Chronic plantar foot ulcer near the third metatarsal  head. Evaluate for osteomyelitis.    TECHNIQUE: Multiplanar, multisequence MR imaging of the left forefoot  and midfoot was obtained using standard sequences in 3 planes without  the use of intravenous or intra-articular gadolinium contrast.    FINDINGS:    Foci of metallic artifact adjacent to the third metatarsophalangeal  joint.    Diffuse bone marrow edema within the majority of the third metatarsal  as well as in the third toe proximal phalanx. There is low T1 signal  throughout the mid to distal third metatarsal with subtle low T1  signal in the third toe proximal phalanx. Subtle increased T2 signal  with subtle low T1 signal in the distal fourth metatarsal and adjacent  fourth toe proximal phalanx. Very subtle increased T2 signal in the  previously amputated second metatarsal without corresponding low T1  signal.    Nonspecific bone marrow edema in the cuboid with minimal low T1  signal.    Diffuse subcutaneous soft tissue edema along the dorsal aspect of the  foot with diffuse edema throughout the visualized musculature.    No discrete fluid collection.    The Lisfranc ligament is intact and the Lisfranc joint is congruent.    No full-thickness tendon tear or tendon retraction.    Mild atrophy within the soft tissues.    Degenerative changes at the fourth tarsometatarsal joint.      Impression    IMPRESSION:  1. Postsurgical changes of prior amputation of the left great toe, and  the mid to distal second metatarsal and second toe.    2. Bone marrow edema with low T1 signal involving the mid to distal  third metatarsal as well as the adjacent third toe proximal phalanx.  MRI findings consistent with osteomyelitis.    3. Subtle bone marrow edema in the distal fourth metatarsal as well as  in the adjacent fourth toe proximal phalanx with question of very low  T1 signal, MRI findings concerning for osteomyelitis versus osteitis.    4. Subtle bone marrow edema  in the remaining actually resected second  metatarsal, without low T1 signal, findings presumed to be  postsurgical versus osteitis.    5. Diffuse subcutaneous soft tissue edema as well as edema within the  musculature without discrete fluid collection or abscess. Findings  presumed to represent cellulitis and myositis.    6. Foci of low signal along the dorsal aspect of the third metatarsal,  presumed metallic artifact.    7. Bone marrow edema in the cuboid, nonspecific. Correlate clinically  for ulcer in this region versus stress changes.    YSABEL LINTON MD         SYSTEM ID:  [id number]   XR Foot Port Left 2 Views    Narrative    XR FOOT PORT LEFT 2 VIEWS   6/18/2024 2:49 PM     HISTORY: s/p transmetatarsal amputation  COMPARISON: MRI 6/18/2024       Impression    IMPRESSION: Normal first through fifth transmetatarsal amputation.  Soft tissue swelling. No acute fracture.    LUIS E GUTIERREZ MD         SYSTEM ID:  [id number]

## 2024-06-18 NOTE — ANESTHESIA POSTPROCEDURE EVALUATION
Patient: Crispin Rojas    Procedure: Procedure(s):  Left foot transmetatarsal amputation       Anesthesia Type:  MAC    Note:     Postop Pain Control: Uneventful            Sign Out: Well controlled pain   PONV: No   Neuro/Psych: Uneventful            Sign Out: Acceptable/Baseline neuro status   Airway/Respiratory:             Sign Out: Acceptable/Baseline resp. status   CV/Hemodynamics:             Sign Out: Acceptable CV status   Other NRE:    DID A NON-ROUTINE EVENT OCCUR?            Last vitals:  Vitals Value Taken Time   /75 06/18/24 1450   Temp 98  F (36.7  C) 06/18/24 1425   Pulse 52 06/18/24 1450   Resp 18 06/18/24 1430   SpO2 94 % 06/18/24 1459   Vitals shown include unfiled device data.    Electronically Signed By: Adonay Huitron DO  June 18, 2024  3:01 PM

## 2024-06-18 NOTE — OP NOTE
PREOPERATIVE DIAGNOSES:     1.  Left foot third metatarsal   2.  Hx of hallux amputation and second ray resection, left foot        POSTOPERATIVE DIAGNOSES:   1.  Left foot third metatarsal   2.  Hx of hallux amputation and second ray resection, left foot    PROCEDURE:     1.  Left foot transmetatarsal amputation       ANESTHESIA:  MAC with local.       HEMOSTASIS:  Electrocautery.       ESTIMATED BLOOD LOSS:  50 mL.       SPECIMENS:  Soft issue culture, forefoot for pathology       MATERIALS:  None     Indications: Patient has an ulcer and active infection to the left foot third metatarsal. Ulcer has been present for several months. He also has a history of previous second ray resection and hallux amputation. Given this, recommendation was made for a third ray resection vs a transmetatarsal amputation, in order to optimize his foot biomechanically. He a      Procedure: Under mild sedation pt was brought into the OR & placed on the operating table in a supine position.  20 cc of .5% marcaine were injected into the ankle, completing an ankle block. first met in a stevenson block formation  The foot was scrubbed, prepped and draped in an aseptic manner.  A pneumatic ankle tourniquet was applied and inflated to 250 mmHg for a total of 22 minutes.     A standard TMA incision was made along the digital sulcus and the base of the digits on the forefoot, excising the ulcer and infected tissue. The freer elevator was then used to reflect all soft tissue, exposing each of the metatarsals. The sagittal saw was then used to cut through the metatarsals, starting with the first one and moving to the fifth one.This was continued in through and through cuts and the bones and digits were sharply excised and sent to pathology. The ankle was then deflated so that the site could be evaluated for bleeding. All bleeders were ligated and cauterized as necessary.      All resected bone was sent to pathology.  All nonviable soft tissue was  resected  insuring no necrotic tissue proximal to the amputation remained. Next copious amounts of saline were used to flush the amputation site. It was then closed with 2-0 and 3-0 prolene.     Upon completion of suturing, a non-adhesive sterile dressing was then placed over the surgical site followed by 4 x 4s, kerlix, & an ACE wrap.      The pt tolerated the procedure & anesthesia well.  He was transferred to the recovery room with vital signs stable and vascular status intact to the right foot.  Following a period of post-op monitoring the pt will return to his hospital bed with the following written and oral post-op instructions:     Keep dressing clean, dry and intact.  Do not remove dressing.  NWB to left foot in CAM boot  Elevate foot at rest.  Continue IV antibiotics  Contact Dr. Soto if any post-op questions or arrise.      Intraop findings: Noted infection to the third metatarsal, proximal site appeared viable. Appropriate bleeding for procedure.     Post op plan:  Will see on POD#1. Cont to hold eliquis until POD#1. NWB to LLE. Elevate while in bed.

## 2024-06-18 NOTE — ANESTHESIA PREPROCEDURE EVALUATION
Anesthesia Pre-Procedure Evaluation    Patient: Crispin Rojas   MRN: [id number] : 1960        Procedure : Procedure(s):  Left foot transmetatarsal amputation          Past Medical History:   Diagnosis Date    Antiplatelet or antithrombotic long-term use     Ascending aorta dilatation (H24)     Cerebral artery occlusion with cerebral infarction (H)     Cerebral infarction (H)         Gastroesophageal reflux disease with esophagitis     H/O blood clots     Hyperlipidemia LDL goal <70     Hypertension     Nonalcoholic steatohepatitis 2022    Numbness and tingling     Obesity     GILA (obstructive sleep apnea) 10/22/2018    CPAP intolerant    PVD (peripheral vascular disease) (H24)     s/p left partial toe amputation    Renal stone     Tremor     Type 2 diabetes mellitus with diabetic polyneuropathy, with long-term current use of insulin (H)       Past Surgical History:   Procedure Laterality Date    AMPUTATE FOOT Left 2016    Procedure: AMPUTATE FOOT;  Surgeon: Jack Younger DPM;  Location: [facility]    AMPUTATE TOE(S) Right 2016    Procedure: AMPUTATE TOE(S);  Surgeon: Rachelle Manriquez DPM, Pod;  Location: [facility]    AMPUTATE TOE(S) Right 2019    Procedure: Right fourth toe partial amputation for treatment of osteomyelitis.;  Surgeon: Jack Younger DPM;  Location: [facility]    AMPUTATE TOE(S) Left 2019    Procedure: Left second toe amputation at the metatarsophalangeal joint.;  Surgeon: Rachelle Manriquez DPM, Podiatry/Foot and Ankle Surgery;  Location: [facility]    AMPUTATE TOE(S) Right 2022    Procedure: AMPUTATION OF RIGHT GREAT TOE;  Surgeon: Wili Quiles DPM;  Location: [facility]    AMPUTATE TOE(S) Right 2022    Procedure: Right foot partial first metatarsal resection;  Surgeon: Tiny Soto DPM;  Location: [facility]    ANGIOGRAM      BIOPSY BONE FOOT Right 2022    Procedure: Bone biopsy, right first metatarsal.;  Surgeon: Valentino Molina DPM;   Location: [facility]    COLONOSCOPY      COMBINED CYSTOSCOPY, RETROGRADES, URETEROSCOPY, INSERT STENT Left 8/21/2016    Procedure: COMBINED CYSTOSCOPY, RETROGRADES, URETEROSCOPY, INSERT STENT;  Surgeon: Artemio Valenzuela MD;  Location: [facility]    COMBINED CYSTOSCOPY, RETROGRADES, URETEROSCOPY, LASER HOLMIUM LITHOTRIPSY URETER(S), INSERT STENT Left 10/3/2016    Procedure: COMBINED CYSTOSCOPY, RETROGRADES, URETEROSCOPY, LASER HOLMIUM LITHOTRIPSY URETER(S), INSERT STENT;  Surgeon: Artemio Valenzuela MD;  Location: [facility]    EXTRACORPOREAL SHOCK WAVE LITHOTRIPSY (ESWL) Left 9/8/2016    Procedure: EXTRACORPOREAL SHOCK WAVE LITHOTRIPSY (ESWL);  Surgeon: Artemio Valenzuela MD;  Location: [facility]    INCISION AND DRAINAGE FOOT, COMBINED Right 7/24/2022    Procedure: Incision and drainage, right foot ;  Surgeon: Valentino Molina DPM;  Location: [facility]    IRRIGATION AND DEBRIDEMENT FOOT, COMBINED Left 10/19/2023    Procedure: Left foot second metatarsal resection , . Left plantar ulcer excisional debridement, down to and including tendon, with closure, site measuring 4 cm x 2 cm x 1 cm;  Surgeon: Tiny Soto DPM;  Location: [facility]    OSTEOTOMY FOOT Right 11/30/2022    Procedure: 1. Right second metatarsal floating osteotomy  2. Right second digit proximal phalanx arthroplasty 3. Right percutaneous flexor tenotomy;  Surgeon: Tiny Soto DPM;  Location: [facility]    OSTEOTOMY FOOT Right 1/19/2023    Procedure: Right foot third metatarsal head excision, ulcer debridement, matatarsal osteotomies;  Surgeon: Tiny Soto DPM;  Location: [facility]    RECESSION GASTROCNEMIUS Right 2/1/2016    Procedure: RECESSION GASTROCNEMIUS;  Surgeon: Rachelle Manriquez DPM, Pod;  Location: [facility]      Allergies   Allergen Reactions    Statins Swelling     Other reaction(s): Other (see comments)  SIMCOR caused edema in extremities      Bactrim [Sulfamethoxazole-Trimethoprim] Nausea and Vomiting    Sulfa Antibiotics Nausea     Sulfatolamide Nausea and Vomiting    Niacin Swelling     SIMCOR caused edema in extremities    Simvastatin Swelling     SIMCOR caused edema in extremities      Social History     Tobacco Use    Smoking status: Never    Smokeless tobacco: Never   Substance Use Topics    Alcohol use: Yes     Comment: rare---red wine 3x per month      Wt Readings from Last 1 Encounters:   06/17/24 107.7 kg (237 lb 7 oz)        Anesthesia Evaluation            ROS/MED HX  ENT/Pulmonary:     (+) sleep apnea,                                       Neurologic:     (+)          CVA, date: 2010,                     Cardiovascular:     (+)  hypertension- -   -  - -   Taking blood thinners                                   METS/Exercise Tolerance:     Hematologic:       Musculoskeletal:       GI/Hepatic:     (+)             liver disease (SNYDER cirrhosis),       Renal/Genitourinary:       Endo:     (+)  type II DM,             Obesity (BMI 35),       Psychiatric/Substance Use:       Infectious Disease: Comment: Osteomyelitis        Malignancy:       Other:            Physical Exam    Airway        Mallampati: II   TM distance: > 3 FB   Neck ROM: full   Mouth opening: > 3 cm    Respiratory Devices and Support         Dental           Cardiovascular   cardiovascular exam normal          Pulmonary   pulmonary exam normal                OUTSIDE LABS:  CBC:   Lab Results   Component Value Date    WBC 4.2 06/18/2024    WBC 6.7 06/17/2024    HGB 11.5 (L) 06/18/2024    HGB 10.8 (L) 06/17/2024    HCT 36.0 (L) 06/18/2024    HCT 33.7 (L) 06/17/2024     06/18/2024     06/17/2024     BMP:   Lab Results   Component Value Date     06/18/2024     06/17/2024    POTASSIUM 4.4 06/18/2024    POTASSIUM 3.7 06/17/2024    CHLORIDE 104 06/18/2024    CHLORIDE 103 06/17/2024    CO2 23 06/18/2024    CO2 20 (L) 06/17/2024    BUN 16.7 06/18/2024    BUN 23.1 (H) 06/17/2024    CR 0.94 06/18/2024    CR 1.18 (H) 06/17/2024     (H) 06/18/2024      (H) 06/18/2024     COAGS:   Lab Results   Component Value Date    PTT 39 (H) 07/28/2022    INR 1.08 11/07/2019     POC:   Lab Results   Component Value Date     (H) 05/10/2021     HEPATIC:   Lab Results   Component Value Date    ALBUMIN 4.2 01/31/2024    PROTTOTAL 7.7 01/31/2024    ALT 67 01/31/2024    AST 57 (H) 01/31/2024    ALKPHOS 61 01/31/2024    BILITOTAL 0.3 01/31/2024     OTHER:   Lab Results   Component Value Date    LACT 2.4 (H) 06/17/2024    A1C 6.4 (H) 06/17/2024    NARA 9.0 06/18/2024    MAG 2.1 08/02/2019    LIPASE 156 07/22/2022    TSH 1.18 07/23/2022    CRP 70.9 (H) 07/22/2022    SED 49 (H) 06/17/2024       Anesthesia Plan    ASA Status:  3    NPO Status:  NPO Appropriate    Anesthesia Type: MAC.   Induction: Intravenous.   Maintenance: TIVA.        Consents    Anesthesia Plan(s) and associated risks, benefits, and realistic alternatives discussed. Questions answered and patient/representative(s) expressed understanding.     - Discussed:     - Discussed with:  Patient            Postoperative Care    Pain management: IV analgesics, Oral pain medications, Multi-modal analgesia.   PONV prophylaxis: Ondansetron (or other 5HT-3), Dexamethasone or Solumedrol     Comments:               Marilia Cullen MD    I have reviewed the pertinent notes and labs in the chart from the past 30 days and (re)examined the patient.  Any updates or changes from those notes are reflected in this note.

## 2024-06-19 ENCOUNTER — APPOINTMENT (OUTPATIENT)
Dept: PHYSICAL THERAPY | Facility: CLINIC | Age: 64
End: 2024-06-19
Payer: COMMERCIAL

## 2024-06-19 LAB
ANION GAP SERPL CALCULATED.3IONS-SCNC: 13 MMOL/L (ref 7–15)
BUN SERPL-MCNC: 14.2 MG/DL (ref 8–23)
CALCIUM SERPL-MCNC: 9.3 MG/DL (ref 8.8–10.2)
CHLORIDE SERPL-SCNC: 103 MMOL/L (ref 98–107)
CREAT SERPL-MCNC: 1.04 MG/DL (ref 0.67–1.17)
DEPRECATED HCO3 PLAS-SCNC: 23 MMOL/L (ref 22–29)
EGFRCR SERPLBLD CKD-EPI 2021: 81 ML/MIN/1.73M2
GLUCOSE BLDC GLUCOMTR-MCNC: 124 MG/DL (ref 70–99)
GLUCOSE BLDC GLUCOMTR-MCNC: 172 MG/DL (ref 70–99)
GLUCOSE BLDC GLUCOMTR-MCNC: 183 MG/DL (ref 70–99)
GLUCOSE BLDC GLUCOMTR-MCNC: 183 MG/DL (ref 70–99)
GLUCOSE SERPL-MCNC: 159 MG/DL (ref 70–99)
HOLD SPECIMEN: NORMAL
POTASSIUM SERPL-SCNC: 4.2 MMOL/L (ref 3.4–5.3)
SODIUM SERPL-SCNC: 139 MMOL/L (ref 135–145)
VANCOMYCIN SERPL-MCNC: 9.1 UG/ML

## 2024-06-19 PROCEDURE — 258N000003 HC RX IP 258 OP 636: Mod: JZ | Performed by: INTERNAL MEDICINE

## 2024-06-19 PROCEDURE — 97161 PT EVAL LOW COMPLEX 20 MIN: CPT | Mod: GP

## 2024-06-19 PROCEDURE — 80202 ASSAY OF VANCOMYCIN: CPT | Performed by: INTERNAL MEDICINE

## 2024-06-19 PROCEDURE — 250N000013 HC RX MED GY IP 250 OP 250 PS 637: Performed by: INTERNAL MEDICINE

## 2024-06-19 PROCEDURE — 36415 COLL VENOUS BLD VENIPUNCTURE: CPT | Performed by: INTERNAL MEDICINE

## 2024-06-19 PROCEDURE — 97530 THERAPEUTIC ACTIVITIES: CPT | Mod: GP

## 2024-06-19 PROCEDURE — 999N000127 HC STATISTIC PERIPHERAL IV START W US GUIDANCE

## 2024-06-19 PROCEDURE — 250N000013 HC RX MED GY IP 250 OP 250 PS 637: Performed by: STUDENT IN AN ORGANIZED HEALTH CARE EDUCATION/TRAINING PROGRAM

## 2024-06-19 PROCEDURE — 99232 SBSQ HOSP IP/OBS MODERATE 35: CPT | Performed by: STUDENT IN AN ORGANIZED HEALTH CARE EDUCATION/TRAINING PROGRAM

## 2024-06-19 PROCEDURE — 250N000011 HC RX IP 250 OP 636: Mod: JZ | Performed by: INTERNAL MEDICINE

## 2024-06-19 PROCEDURE — 250N000013 HC RX MED GY IP 250 OP 250 PS 637: Performed by: PODIATRIST

## 2024-06-19 PROCEDURE — 80048 BASIC METABOLIC PNL TOTAL CA: CPT | Performed by: INTERNAL MEDICINE

## 2024-06-19 PROCEDURE — 120N000001 HC R&B MED SURG/OB

## 2024-06-19 RX ORDER — AMLODIPINE BESYLATE 10 MG/1
10 TABLET ORAL DAILY
Status: DISCONTINUED | OUTPATIENT
Start: 2024-06-20 | End: 2024-06-21 | Stop reason: HOSPADM

## 2024-06-19 RX ADMIN — PIPERACILLIN AND TAZOBACTAM 4.5 G: 4; .5 INJECTION, POWDER, FOR SOLUTION INTRAVENOUS at 00:55

## 2024-06-19 RX ADMIN — FERROUS SULFATE TAB 325 MG (65 MG ELEMENTAL FE) 325 MG: 325 (65 FE) TAB at 08:28

## 2024-06-19 RX ADMIN — AMLODIPINE BESYLATE 10 MG: 10 TABLET ORAL at 08:28

## 2024-06-19 RX ADMIN — PIPERACILLIN AND TAZOBACTAM 4.5 G: 4; .5 INJECTION, POWDER, FOR SOLUTION INTRAVENOUS at 07:03

## 2024-06-19 RX ADMIN — PROPRANOLOL HYDROCHLORIDE 20 MG: 20 TABLET ORAL at 20:43

## 2024-06-19 RX ADMIN — PIPERACILLIN AND TAZOBACTAM 4.5 G: 4; .5 INJECTION, POWDER, FOR SOLUTION INTRAVENOUS at 15:12

## 2024-06-19 RX ADMIN — VANCOMYCIN HYDROCHLORIDE 1500 MG: 5 INJECTION, POWDER, LYOPHILIZED, FOR SOLUTION INTRAVENOUS at 08:43

## 2024-06-19 RX ADMIN — GABAPENTIN 400 MG: 400 CAPSULE ORAL at 15:11

## 2024-06-19 RX ADMIN — ACETAMINOPHEN 975 MG: 325 TABLET, FILM COATED ORAL at 00:54

## 2024-06-19 RX ADMIN — GABAPENTIN 400 MG: 400 CAPSULE ORAL at 08:27

## 2024-06-19 RX ADMIN — PANTOPRAZOLE SODIUM 40 MG: 40 TABLET, DELAYED RELEASE ORAL at 06:42

## 2024-06-19 RX ADMIN — VANCOMYCIN HYDROCHLORIDE 1500 MG: 5 INJECTION, POWDER, LYOPHILIZED, FOR SOLUTION INTRAVENOUS at 20:44

## 2024-06-19 RX ADMIN — GABAPENTIN 400 MG: 400 CAPSULE ORAL at 20:44

## 2024-06-19 RX ADMIN — PIPERACILLIN AND TAZOBACTAM 4.5 G: 4; .5 INJECTION, POWDER, FOR SOLUTION INTRAVENOUS at 23:13

## 2024-06-19 RX ADMIN — APIXABAN 5 MG: 5 TABLET, FILM COATED ORAL at 20:43

## 2024-06-19 RX ADMIN — PROPRANOLOL HYDROCHLORIDE 20 MG: 20 TABLET ORAL at 08:28

## 2024-06-19 RX ADMIN — ATORVASTATIN CALCIUM 40 MG: 40 TABLET, FILM COATED ORAL at 20:43

## 2024-06-19 ASSESSMENT — ACTIVITIES OF DAILY LIVING (ADL)
ADLS_ACUITY_SCORE: 21
ADLS_ACUITY_SCORE: 24
ADLS_ACUITY_SCORE: 21
ADLS_ACUITY_SCORE: 24
ADLS_ACUITY_SCORE: 21
ADLS_ACUITY_SCORE: 21
ADLS_ACUITY_SCORE: 24
ADLS_ACUITY_SCORE: 21
ADLS_ACUITY_SCORE: 24
ADLS_ACUITY_SCORE: 21
ADLS_ACUITY_SCORE: 21
ADLS_ACUITY_SCORE: 24
ADLS_ACUITY_SCORE: 21
ADLS_ACUITY_SCORE: 24

## 2024-06-19 NOTE — CONSULTS
"SPIRITUAL HEALTH SERVICES - Consult Note  [facility]  Referral Source/Reason for Visit: [facility] Consult per length of stay.    Summary and Recommendations -  Pt Judie reported unease about the uncertainty of his recovery and whether he will be able to fully resume previous activities  Pat is a member at [employer] [facility] [employer] in [place] but recently moved and so has been attending [facility] in [place], he has already contacted his .  Provided prayer for him and for his sister, per his request.    Plan: MONICA and other chaplains remain available on request.    Breezy Tierney    Intern     [facility] routine referrals?*[phone]  [facility] available 24/7 for emergent requests/referrals, either by paging the on-call  or by entering an ASAP/STAT consult in [facility], which will also page the on-call .      Assessment    Saw pt Crispin Rojas per [facility] consult for length of stay.    Patient/Family Understanding of Illness and Goals of Care -   Pt Crispin reported that he recently had a \"major\" amputation of his toes.  He reported that he will be starting physical therapy tomorrow.    Distress and Loss -   Pt Crispin reported distress over whether he will be able to do all of the activities he could do prior to amputation, particularly whether he will still be able to play golf.  He reported that his sister Elina was recently in a bad car accident and that he is concerned for her.    Strengths, Coping, and Resources -   Crispin reported turning to his son Benjie, who he said is a \"sports psychologist\", who motivates him.  He also gets support from his wife and his daughter.  His face lights up when he talks about his children's accomplishments.    Meaning, Beliefs, and Spirituality -   Crispin is a practicing Christianity and attends services regularly.  He also reported that he has \"read the Quran\" and \"attended Alevism services\".  He values prayer for discernment of what path to take, he reported " "praying about options for his amputation and that, after praying and making a decision he felt \"a feeling of calm wash over me\".  He requested a prayer for healing for him and for his sister to \"help her make good decisions\" regarding her post-accident care, which I provided.    "

## 2024-06-19 NOTE — PHARMACY-VANCOMYCIN DOSING SERVICE
"Pharmacy Vancomycin Note  Date of Service 2024  Patient's  1960   64 year old, male    Indication: Osteomyelitis  Day of Therapy: 3  Current vancomycin regimen:  1,000 mg IV q12h  Current vancomycin monitoring method: AUC  Current vancomycin therapeutic monitoring goal: 400-600 mg*h/L    [employer] Prediction of Current Vancomycin Regimen  Loading dose: N/A  Regimen: 1000 mg IV every 12 hours.  Start time: 09:15 on 2024  Exposure target: AUC24 (range)400-600 mg/L.hr   AUC24,ss: 310 mg/L.hr  Probability of AUC24 > 400: 5 %  Ctrough,ss: 8 mg/L  Probability of Ctrough,ss > 20: 0 %  Probability of nephrotoxicity (Lodise LYNN ): 5 %      Current estimated CrCl = Estimated Creatinine Clearance: 89.1 mL/min (based on SCr of 1.04 mg/dL).    Creatinine for last 3 days  2024:  4:20 AM Creatinine 1.18 mg/dL  2024:  6:23 AM Creatinine 0.94 mg/dL  2024:  6:00 AM Creatinine 1.04 mg/dL    Recent Vancomycin Levels (past 3 days)  2024:  6:00 AM Vancomycin 9.1 ug/mL    Vancomycin IV Administrations (past 72 hours)                     vancomycin (VANCOCIN) 1,000 mg in 200 mL dextrose intermittent infusion (mg) 1,000 mg New Bag 24     1,000 mg New Bag  1027     1,000 mg New Bag 24    vancomycin (VANCOCIN) 2,500 mg in 0.9% NaCl 500 mL intermittent infusion (mg) 2,500 mg New Bag 24 0932                    Nephrotoxins and other renal medications (From now, onward)      Start     Dose/Rate Route Frequency Ordered Stop    24 0900  vancomycin (VANCOCIN) 1,500 mg in 0.9% NaCl 250 mL intermittent infusion         1,500 mg  over 90 Minutes Intravenous EVERY 12 HOURS 24 0829      24 1200  piperacillin-tazobactam (ZOSYN) 4.5 g vial to attach to  mL bag        Note to Pharmacy: For [facility], [facility] and [facility]: For Zosyn-naive patients, use the \"Zosyn initial dose + extended infusion\" order panel.    4.5 g  over 30 Minutes Intravenous EVERY 6 HOURS 24 6137  "     06/17/24 0800  [Held by provider]  lisinopril (ZESTRIL) tablet 40 mg        (On hold since Mon 6/17/2024 at 0645 until manually unheld; held by Albert Maguire MDHold Reason: Transfer to a procedural area)   Note to Pharmacy: PTA Sig:Take 1 tablet (40 mg) by mouth daily      40 mg Oral DAILY 06/17/24 0645                 Contrast Orders - past 72 hours (72h ago, onward)      None            Interpretation of levels and current regimen:  Vancomycin level is reflective of -600    Has serum creatinine changed greater than 50% in last 72 hours: No    Urine output:  unable to determine    Renal Function: Stable    Insight[employer] Prediction of Planned New Vancomycin Regimen  Loading dose: N/A  Regimen: 1500 mg IV every 12 hours.  Start time: 09:15 on 06/19/2024  Exposure target: AUC24 (range)400-600 mg/L.hr   AUC24,ss: 463 mg/L.hr  Probability of AUC24 > 400: 83 %  Ctrough,ss: 12.1 mg/L  Probability of Ctrough,ss > 20: 1 %  Probability of nephrotoxicity (Lodise LYNN 2009): 7 %    Plan:  Increase Dose to 1,500 mg IV q12h  Vancomycin monitoring method: AUC  Vancomycin therapeutic monitoring goal: 400-600 mg*h/L  Pharmacy will check vancomycin levels as appropriate in 1-3 Days.  Serum creatinine levels will be ordered daily for the first week of therapy and at least twice weekly for subsequent weeks.    Misa Hensley [facility]

## 2024-06-19 NOTE — PROGRESS NOTES
Fit patient with Cam boot.  Patient familiar with as said has had before.  Verbal, physical instruction given.  Extensions provided for short term while dressing still thick.  Patient to follow with office as needed.  Celestino BALLARD.

## 2024-06-19 NOTE — PLAN OF CARE
"Goal Outcome Evaluation:      Plan of Care Reviewed With: patient    Overall Patient Progress: improvingOverall Patient Progress: improving    Outcome Evaluation: Amputation complete. RN explained movement and weight restrictions. BLE numb, so not yet OOB. Patient agreeable. Diabetic meds changed to further meet patient's home regimen. More cooperative. Discharge pending podiatry.      Problem: Adult Inpatient Plan of Care  Goal: Plan of Care Review  Description: The Plan of Care Review/Shift note should be completed every shift.  The Outcome Evaluation is a brief statement about your assessment that the patient is improving, declining, or no change.  This information will be displayed automatically on your shift  note.  Outcome: Progressing  Flowsheets (Taken 6/18/2024 2002)  Outcome Evaluation: Amputation complete. RN explained movement and weight restrictions. BLE numb, so not yet OOB. Patient agreeable. Diabetic meds changed to further meet patient's home regimen. More cooperative.  Plan of Care Reviewed With: patient  Overall Patient Progress: improving  Goal: Patient-Specific Goal (Individualized)  Description: You can add care plan individualizations to a care plan. Examples of Individualization might be:  \"Parent requests to be called daily at 9am for status\", \"I have a hard time hearing out of my right ear\", or \"Do not touch me to wake me up as it startles  me\".  Outcome: Progressing  Goal: Absence of Hospital-Acquired Illness or Injury  Outcome: Progressing  Intervention: Prevent and Manage VTE (Venous Thromboembolism) Risk  Recent Flowsheet Documentation  Taken 6/18/2024 1900 by Cr Hester RN  VTE Prevention/Management: SCDs (sequential compression devices) on  Goal: Optimal Comfort and Wellbeing  Outcome: Progressing  Goal: Readiness for Transition of Care  Outcome: Progressing     Problem: Wound  Goal: Optimal Coping  Outcome: Progressing  Goal: Optimal Functional Ability  Outcome: " Progressing  Goal: Absence of Infection Signs and Symptoms  Outcome: Progressing  Goal: Improved Oral Intake  Outcome: Progressing  Goal: Optimal Pain Control and Function  Outcome: Progressing  Goal: Skin Health and Integrity  Outcome: Progressing  Goal: Optimal Wound Healing  Outcome: Progressing     Problem: Comorbidity Management  Goal: Blood Glucose Levels Within Targeted Range  Outcome: Progressing     Problem: Lower Extremity Amputation  Goal: Optimal Adjustment to Amputation  Outcome: Progressing  Goal: Optimal Safe BADL Performance  Outcome: Progressing  Goal: Optimal Safe IADL Performance  Outcome: Progressing  Goal: Effective Lower Limb Care  Outcome: Progressing  Goal: Optimal Mobility [place] and Safety  Outcome: Progressing  Goal: Acceptable Pain/Hypersensitivity Control  Outcome: Progressing  Goal: Effective Prosthesis Use and Management  Outcome: Progressing

## 2024-06-19 NOTE — PLAN OF CARE
"Goal Outcome Evaluation:      Plan of Care Reviewed With: patient    Overall Patient Progress: improvingOverall Patient Progress: improving    Outcome Evaluation: Pt A&Ox 4. VSS. Assist x 1 with walker and gait belt. Non-weight bearing on left foot. CMS intact. Dressing CDI. Had a small bowel movement today. Voiding. C/o LLE pain. Pain medication offered but refused medication. Cold applied. Abx Zosyn and Vanco. On Eliquis. Denies nausea or SOB. , 183 and 172      Problem: Adult Inpatient Plan of Care  Goal: Plan of Care Review  Description: The Plan of Care Review/Shift note should be completed every shift.  The Outcome Evaluation is a brief statement about your assessment that the patient is improving, declining, or no change.  This information will be displayed automatically on your shift  note.  Outcome: Progressing  Flowsheets (Taken 6/19/2024 1712)  Outcome Evaluation: Pt A&Ox 4. VSS. Assist x 1 with walker and gait belt. Non-weight bearing on left foot. CMS intact. Dressing CDI. Had a small bowel movement today. Voiding. C/o LLE pain. Pain medication offered but refused medication. Cold applied. Abx Zosyn and Vanco. On Eliquis. Denies nausea or SOB. , 183 and 172  Plan of Care Reviewed With: patient  Overall Patient Progress: improving  Goal: Patient-Specific Goal (Individualized)  Description: You can add care plan individualizations to a care plan. Examples of Individualization might be:  \"Parent requests to be called daily at 9am for status\", \"I have a hard time hearing out of my right ear\", or \"Do not touch me to wake me up as it startles  me\".  Outcome: Progressing  Goal: Absence of Hospital-Acquired Illness or Injury  Outcome: Progressing  Intervention: Identify and Manage Fall Risk  Recent Flowsheet Documentation  Taken 6/19/2024 1630 by Clau Blank RN  Safety Promotion/Fall Prevention:   activity supervised   clutter free environment maintained   nonskid shoes/slippers when out of " bed   safety round/check completed  Taken 6/19/2024 0820 by Clau Blank RN  Safety Promotion/Fall Prevention:   activity supervised   clutter free environment maintained   nonskid shoes/slippers when out of bed   safety round/check completed  Intervention: Prevent Skin Injury  Recent Flowsheet Documentation  Taken 6/19/2024 1630 by Clau Blank RN  Body Position:   position changed independently   supine, legs elevated  Taken 6/19/2024 0820 by Clau Blank RN  Body Position:   position changed independently   supine, legs elevated  Device Skin Pressure Protection: absorbent pad utilized/changed  Intervention: Prevent and Manage VTE (Venous Thromboembolism) Risk  Recent Flowsheet Documentation  Taken 6/19/2024 0820 by Clau Blank RN  VTE Prevention/Management: SCDs (sequential compression devices) on  Intervention: Prevent Infection  Recent Flowsheet Documentation  Taken 6/19/2024 1630 by Clau Blank RN  Infection Prevention:   hand hygiene promoted   single patient room provided  Taken 6/19/2024 0820 by Clau Blank RN  Infection Prevention:   hand hygiene promoted   single patient room provided  Goal: Optimal Comfort and Wellbeing  Outcome: Progressing  Intervention: Monitor Pain and Promote Comfort  Recent Flowsheet Documentation  Taken 6/19/2024 0925 by Clau Blank RN  Pain Management Interventions: pain management plan reviewed with patient/caregiver  Taken 6/19/2024 0820 by Clau Blank RN  Pain Management Interventions: medication offered but refused  Goal: Readiness for Transition of Care  Outcome: Progressing

## 2024-06-19 NOTE — PLAN OF CARE
"Goal Outcome Evaluation:      Plan of Care Reviewed With: patient    Overall Patient Progress: improvingOverall Patient Progress: improving    Patient vital signs are at baseline: Yes  Patient able to ambulate as they were prior to admission or with assist devices provided by therapies during their stay:  No,  Reason:  NWB on LLE  Patient MUST void prior to discharge:  Yes  Patient able to tolerate oral intake:  Yes  Pain has adequate pain control using Oral analgesics:  Yes  Dressing on L foot CDI  Problem: Adult Inpatient Plan of Care  Goal: Plan of Care Review  Description: The Plan of Care Review/Shift note should be completed every shift.  The Outcome Evaluation is a brief statement about your assessment that the patient is improving, declining, or no change.  This information will be displayed automatically on your shift  note.  Outcome: Progressing  Flowsheets (Taken 6/18/2024 2223)  Plan of Care Reviewed With: patient  Overall Patient Progress: improving  Goal: Patient-Specific Goal (Individualized)  Description: You can add care plan individualizations to a care plan. Examples of Individualization might be:  \"Parent requests to be called daily at 9am for status\", \"I have a hard time hearing out of my right ear\", or \"Do not touch me to wake me up as it startles  me\".  Outcome: Progressing  Goal: Absence of Hospital-Acquired Illness or Injury  Outcome: Progressing  Goal: Optimal Comfort and Wellbeing  Outcome: Progressing  Goal: Readiness for Transition of Care  Outcome: Progressing     Problem: Wound  Goal: Optimal Coping  Outcome: Progressing  Goal: Optimal Functional Ability  Outcome: Progressing  Goal: Absence of Infection Signs and Symptoms  Outcome: Progressing  Goal: Improved Oral Intake  Outcome: Progressing  Goal: Optimal Pain Control and Function  Outcome: Progressing  Goal: Skin Health and Integrity  Outcome: Progressing  Goal: Optimal Wound Healing  Outcome: Progressing     Problem: Comorbidity " Management  Goal: Blood Glucose Levels Within Targeted Range  Outcome: Progressing     Problem: Lower Extremity Amputation  Goal: Optimal Adjustment to Amputation  Outcome: Progressing  Goal: Optimal Safe BADL Performance  Outcome: Progressing  Goal: Optimal Safe IADL Performance  Outcome: Progressing  Goal: Effective Lower Limb Care  Outcome: Progressing  Goal: Optimal Mobility [place] and Safety  Outcome: Progressing  Goal: Acceptable Pain/Hypersensitivity Control  Outcome: Progressing  Goal: Effective Prosthesis Use and Management  Outcome: Progressing

## 2024-06-19 NOTE — PROGRESS NOTES
[facility]    Medicine Progress Note - Hospitalist Service    Date of Admission:  6/17/2024  Date of Service: 06/19/2024    Assessment & Plan     Chronic left diabetic foot ulcer  Left foot osteomyelitis:   Summary:  History of previous diabetic foot ulcers requiring multiple toe amputations.  Has had a left plantar foot ulcer in the area of the third metatarsal head for the last 8 months that has become much deeper with intermittent blood in purulent drainage over the last 6 to 8 weeks since he stopped going to the wound care clinic he says at the advice of his podiatrist.  There is a picture in Dr. Ji's note that shows out deep the ulcer goes likely to the bone.  There is significant purulent fluid coming out. Further eval suggested osteomyelitis and podiatry consulted.  They recommended surgery, S/p left foot TMA on 6/18   Plan:  -  Continue zosyn + vancomycin.  Consider narrowing soon pending culture results.  - ID consulted  -  Pain control  -  Will defer to podiatry foot/surgical site cares     History of DVT: Chronically on Eliquis 5 mg twice daily.  -Resume Eliquis 06/19     IDDM type II  Polyneuropathy: A1c 6.4.  He takes Ozempic weekly on Wednesdays, metformin XR 1000 mg twice daily, Tresiba usually 35-50 units in the morning.  He also uses 1 unit/9gm carb count with meals.  He has neuropathy, but does have some sensation in his feet.  -  Patient refused partial dose Tresiba this AM.   Now back from surgery and eating regular diet (he does not want diabetic diet).    Plan:  - Tresiba 35 units QAM  - Also adjusted carb count at his request from 1.5 U/15gm to his home 1 unit/9 gm.    - Otherwise continue sliding scale insulin.  -Hold metformin due to elevated lactic acid earlier in stay and hold Ozempic (consider resuming if cannot discharge soon)  -Resume PTA gabapentin 400 mg 3 times daily     CKD stage II: Creatinine baseline is likely 1-1.1 and he is 1.8 on admission.  -BMP  "tomorrow     HTN  Essential tremor: He is on lisinopril 40 mg daily, HCTZ 12.5 mg daily, amlodipine 5 mg twice daily, and propranolol 20 mg daily.  -Hold lisinopril and hydrochlorothiazide today, consider resuming tomorrow if BP higher.  Continue amlodipine and propranolol.     History CVA  PAD  HLD: History of lacunar stroke in 2010.  He also has peripheral chill disease and hyperlipidemia.  He is on Eliquis for previous DVT in addition to atorvastatin 40 mg daily.  -Resume Eliquis  -Atorvastatin     GILA: CPAP      Obesity: BMI 35, complicates care.     GERD: Substituted pantoprazole 40 mg daily for PTA omeprazole.     Chronic anemia: Hemoglobin at baseline of 10-11.     SNYDER    PPE Used:  Mask          Diet: Advance Diet as Tolerated: Regular Diet Adult    DVT Prophylaxis: DOAC  Mccarthy Catheter: Not present  Lines: None     Cardiac Monitoring: None  Code Status: Full Code      Clinically Significant Risk Factors                  # Hypertension: Noted on problem list              # Obesity: Estimated body mass index is 35.06 kg/m  as calculated from the following:    Height as of this encounter: 1.753 m (5' 9\").    Weight as of this encounter: 107.7 kg (237 lb 7 oz)., PRESENT ON ADMISSION            Disposition Plan     Medically Ready for Discharge: Anticipated Tomorrow     Yared Baker MD  Hospitalist Service  [facility]  Securely message with trinket (more info)  Text page via [employer] Paging/Directory     Medical Decision Making       -------------------------- BILLING ON TIME ------------------------------------------------------------------------------------------------------------  35 MINUTES SPENT BY ME on the date of service doing chart review, history, exam, documentation & further activities per the note.      ______________________________________________________________________    Interval History     No new complaints  Pain controlled  No CP/SOB  No nausea / vomiting  No fevers  No " new complaints    Physical Exam   Vital Signs: Temp: 98.5  F (36.9  C) Temp src: Temporal BP: (!) 144/69 Pulse: 72   Resp: 20 SpO2: 93 % O2 Device: None (Room air)    Weight: 237 lbs 6.97 oz    GEN:  no apparent distress  CV:  Regular rate and rhythm, no murmur. S1 + S2 noted, no S3 or S4.  LUNGS: CTABL  ABD:  Active bowel sounds, soft, non-tender/non-distended.  No guarding/rigidity.  EXT:  Left leg still edematous toward ankle, surgical site dressed.  Trace RLE edema.    SKIN:  Dry to touch, no new exanthems noted in the visualized areas.    Medical Decision Making       Over 50 MINUTES SPENT BY ME on the date of service doing chart review, history, exam, documentation & further activities per the note.      Data   Medications   Current Facility-Administered Medications   Medication Dose Route Frequency Provider Last Rate Last Admin     Current Facility-Administered Medications   Medication Dose Route Frequency Provider Last Rate Last Admin    acetaminophen (TYLENOL) tablet 975 mg  975 mg Oral Q8H Tiny Soto DPM   975 mg at 06/19/24 0054    [START ON 6/20/2024] amLODIPine (NORVASC) tablet 10 mg  10 mg Oral Daily Yared Baker MD        apixaban ANTICOAGULANT (ELIQUIS) tablet 5 mg  5 mg Oral BID Yared Baker MD        atorvastatin (LIPITOR) tablet 40 mg  40 mg Oral QPM Albert Maguire MD   40 mg at 06/18/24 2114    ferrous sulfate (FEROSUL) tablet 325 mg  325 mg Oral Q Mon Wed Fri AM Albert Maguire MD   325 mg at 06/19/24 0828    gabapentin (NEURONTIN) capsule 400 mg  400 mg Oral TID Albert Maguire MD   400 mg at 06/19/24 0827    [Held by provider] hydrochlorothiazide (MICROZIDE) capsule 12.5 mg  12.5 mg Oral QAM Albert Maguire MD        insulin aspart (NovoLOG) injection (RAPID ACTING)   Subcutaneous TID w/meals Jeremy Valdes DO   10 Units at 06/19/24 0835    insulin aspart (NovoLOG) injection (RAPID ACTING)  1-7 Units Subcutaneous TID AC Albert Maguire MD   1  Units at 06/17/24 1858    insulin aspart (NovoLOG) injection (RAPID ACTING)  1-5 Units Subcutaneous At Bedtime Albert Maguire MD        [Held by provider] insulin degludec (TRESIBA) 100 UNIT/ML injection 25 Units  25 Units Subcutaneous QAM AC Albert Maguire MD   25 Units at 06/17/24 1018    insulin degludec (TRESIBA) 100 UNIT/ML injection 35 Units  35 Units Subcutaneous QAM Jeremy Valdes DO   35 Units at 06/19/24 0832    [Held by provider] lisinopril (ZESTRIL) tablet 40 mg  40 mg Oral Daily Albert Maguire MD        [Held by provider] metFORMIN (GLUCOPHAGE XR) 24 hr tablet 1,000 mg  1,000 mg Oral BID Albert Maguire MD        pantoprazole (PROTONIX) EC tablet 40 mg  40 mg Oral QAM AC Albert Maguire MD   40 mg at 06/19/24 0642    piperacillin-tazobactam (ZOSYN) 4.5 g vial to attach to  mL bag  4.5 g Intravenous Q6H Albert Maguire MD   4.5 g at 06/19/24 0703    polyethylene glycol (MIRALAX) Packet 17 g  17 g Oral Daily Tiny Soto DPM        propranolol (INDERAL) tablet 20 mg  20 mg Oral BID Albert Maguire MD   20 mg at 06/19/24 0828    senna-docusate (SENOKOT-S/PERICOLACE) 8.6-50 MG per tablet 1 tablet  1 tablet Oral BID Tiny Soto DPM        sodium chloride (PF) 0.9% PF flush 3 mL  3 mL Intracatheter Q8H Tiny Soto DPM   3 mL at 06/19/24 0703    sodium chloride (PF) 0.9% PF flush 3 mL  3 mL Intracatheter Q8H Albert Maguire MD   3 mL at 06/18/24 2253    vancomycin (VANCOCIN) 1,500 mg in 0.9% NaCl 250 mL intermittent infusion  1,500 mg Intravenous Q12H Albert Maguire MD   1,500 mg at 06/19/24 0843     Labs and Imaging results below reviewed today.  Recent Labs   Lab 06/18/24  0623 06/17/24  0420   WBC 4.2 6.7   HGB 11.5* 10.8*   HCT 36.0* 33.7*   MCV 88 89    205     7-Day Micro Results       Collected Updated Procedure Result Status      06/18/2024 1354 06/18/2024 1424 Anaerobic Bacterial Culture Routine  [[id number]]   Tissue from Foot, Left    In process Component Value   No component results               06/18/2024 1354 06/18/2024 1556 Gram Stain [[id number]]   Tissue from Foot, Left    Final result Component Value   GS Culture See corresponding culture for results   Gram Stain Result No organisms seen   Gram Stain Result No white blood cells seen            06/18/2024 1354 06/19/2024 1133 Tissue Aerobic Bacterial Culture Routine [[id number]]    Tissue from Foot, Left    Preliminary result Component Value   Culture No growth, less than 1 day  [P]                06/17/2024 0522 06/19/2024 0846 Blood Culture Peripheral Blood [[id number]]   Peripheral Blood    Preliminary result Component Value   Culture No growth after 2 days  [P]                06/17/2024 0420 06/19/2024 0846 Blood Culture Peripheral Blood [[id number]]   Peripheral Blood    Preliminary result Component Value   Culture No growth after 2 days  [P]                      Recent Labs   Lab 06/19/24  0600 06/19/24  0148 06/18/24  2149 06/18/24  1110 06/18/24  0623 06/17/24  0820 06/17/24  0420     --   --   --  139  --  139   POTASSIUM 4.2  --   --   --  4.4  --  3.7   CHLORIDE 103  --   --   --  104  --  103   CO2 23  --   --   --  23  --  20*   ANIONGAP 13  --   --   --  12  --  16*   * 183* 142*   < > 171*   < > 85   BUN 14.2  --   --   --  16.7  --  23.1*   CR 1.04  --   --   --  0.94  --  1.18*   GFRESTIMATED 81  --   --   --  >90  --  70   NARA 9.3  --   --   --  9.0  --  8.5*    < > = values in this interval not displayed.     Recent Results (from the past 24 hour(s))   XR Foot Port Left 2 Views    Narrative    XR FOOT PORT LEFT 2 VIEWS   6/18/2024 2:49 PM     HISTORY: s/p transmetatarsal amputation  COMPARISON: MRI 6/18/2024       Impression    IMPRESSION: Normal first through fifth transmetatarsal amputation.  Soft tissue swelling. No acute fracture.    LUIS E GUTIERREZ MD         SYSTEM ID:  [id number]     MD EDDIE SAMUELS  [facility]  06/19/2024

## 2024-06-19 NOTE — PLAN OF CARE
"Goal Outcome Evaluation:      Plan of Care Reviewed With: patient    Overall Patient Progress: improvingOverall Patient Progress: improving    Outcome Evaluation: Pt AOx4; POD1; non-weight bearing on left foot, heel okay; urinal at bedside; pain managed with tylenol; abx - iv vanco and zosyn.      Problem: Adult Inpatient Plan of Care  Goal: Plan of Care Review  Description: The Plan of Care Review/Shift note should be completed every shift.  The Outcome Evaluation is a brief statement about your assessment that the patient is improving, declining, or no change.  This information will be displayed automatically on your shift  note.  Outcome: Progressing  Flowsheets (Taken 6/19/2024 0506)  Outcome Evaluation:   Pt AOx4   POD1   non-weight bearing on left foot, heel okay   urinal at bedside   pain managed with tylenol   abx - iv vanco and zosyn.  Plan of Care Reviewed With: patient  Overall Patient Progress: improving  Goal: Patient-Specific Goal (Individualized)  Description: You can add care plan individualizations to a care plan. Examples of Individualization might be:  \"Parent requests to be called daily at 9am for status\", \"I have a hard time hearing out of my right ear\", or \"Do not touch me to wake me up as it startles  me\".  Outcome: Progressing  Goal: Absence of Hospital-Acquired Illness or Injury  Outcome: Progressing  Intervention: Identify and Manage Fall Risk  Recent Flowsheet Documentation  Taken 6/19/2024 0054 by Emelyn Boogie, RN  Safety Promotion/Fall Prevention:   activity supervised   clutter free environment maintained   nonskid shoes/slippers when out of bed   safety round/check completed   room near nurse's station  Intervention: Prevent Skin Injury  Recent Flowsheet Documentation  Taken 6/19/2024 0054 by Emelyn Boogie, RN  Body Position: position changed independently  Skin Protection:   adhesive use limited   incontinence pads utilized  Device Skin Pressure Protection:   " absorbent pad utilized/changed   adhesive use limited  Intervention: Prevent and Manage VTE (Venous Thromboembolism) Risk  Recent Flowsheet Documentation  Taken 6/19/2024 0054 by Emelyn Boogie RN  VTE Prevention/Management: SCDs (sequential compression devices) on  Intervention: Prevent Infection  Recent Flowsheet Documentation  Taken 6/19/2024 0054 by Emelyn Boogie RN  Infection Prevention:   single patient room provided   rest/sleep promoted  Goal: Optimal Comfort and Wellbeing  Outcome: Progressing  Intervention: Monitor Pain and Promote Comfort  Recent Flowsheet Documentation  Taken 6/19/2024 0054 by Emelyn Boogie RN  Pain Management Interventions: medication (see MAR)  Goal: Readiness for Transition of Care  Outcome: Progressing     Problem: Wound  Goal: Optimal Coping  Outcome: Progressing  Intervention: Support Patient and Family Response  Recent Flowsheet Documentation  Taken 6/19/2024 0054 by Emelyn Boogie RN  Supportive Measures:   active listening utilized   self-care encouraged   verbalization of feelings encouraged  Goal: Optimal Functional Ability  Outcome: Progressing  Goal: Absence of Infection Signs and Symptoms  Outcome: Progressing  Goal: Improved Oral Intake  Outcome: Progressing  Goal: Optimal Pain Control and Function  Outcome: Progressing  Intervention: Prevent or Manage Pain  Recent Flowsheet Documentation  Taken 6/19/2024 0054 by Emelyn Boogie RN  Pain Management Interventions: medication (see MAR)  Goal: Skin Health and Integrity  Outcome: Progressing  Intervention: Optimize Skin Protection  Recent Flowsheet Documentation  Taken 6/19/2024 0054 by Emelyn Boogie RN  Pressure Reduction Techniques: frequent weight shift encouraged  Pressure Reduction Devices: heel offloading device utilized  Skin Protection:   adhesive use limited   incontinence pads utilized  Head of Bed (HOB) Positioning: HOB at 20-30 degrees  Goal: Optimal  Wound Healing  Outcome: Progressing     Problem: Comorbidity Management  Goal: Blood Glucose Levels Within Targeted Range  Outcome: Progressing  Intervention: Monitor and Manage Glycemia  Recent Flowsheet Documentation  Taken 6/19/2024 0054 by Emelyn Boogie RN  Glycemic Management: blood glucose monitored  Medication Review/Management: medications reviewed     Problem: Lower Extremity Amputation  Goal: Optimal Adjustment to Amputation  Outcome: Progressing  Intervention: Promote Patient and Family Adjustment to Amputation  Recent Flowsheet Documentation  Taken 6/19/2024 0054 by Emelyn Boogie RN  Supportive Measures:   active listening utilized   self-care encouraged   verbalization of feelings encouraged  Goal: Optimal Safe BADL Performance  Outcome: Progressing  Goal: Optimal Safe IADL Performance  Outcome: Progressing  Goal: Effective Lower Limb Care  Outcome: Progressing  Goal: Optimal Mobility [place] and Safety  Outcome: Progressing  Goal: Acceptable Pain/Hypersensitivity Control  Outcome: Progressing  Goal: Effective Prosthesis Use and Management  Outcome: Progressing

## 2024-06-19 NOTE — PROGRESS NOTES
06/19/24 4189   Appointment Info   Signing Clinician's Name / Credentials (PT) Gini Lopez DPT   Living Environment   People in Home spouse   Current Living Arrangements house   Home Accessibility stairs to enter home   Number of Stairs, Main Entrance 3  (to enter from garage)   Transportation Anticipated family or friend will provide   Living Environment Comments Wife works full time, patient will be at home alone during the day.  All needs met on main level   Self-Care   Usual Activity Tolerance good   Current Activity Tolerance moderate   Equipment Currently Used at Home none  (has a cane and crutches at home for use)   Fall history within last six months no   Activity/Exercise/Self-Care Comment Patient has been golfing (up until day of admission), at baseline ambulates without assistive device   General Information   Onset of Illness/Injury or Date of Surgery 06/17/24   Referring Physician Tiny Soto, SHRUTHI   Patient/Family Therapy Goals Statement (PT) return to home   Pertinent History of Current Problem (include personal factors and/or comorbidities that impact the POC) Per medical chart: Crispin Rojas is a 64 year old male with a history of a nonhealing left foot diabetic ulcer seen POD#1 s/p left foot transmetatarsal amputation.   Existing Precautions/Restrictions fall   Weight-Bearing Status - LLE nonweight-bearing  (per verbal orders from podiatry and PT consult, patient may heel weight bear minimally during transfers and 3 step stair negotiation only.)   Cognition   Orientation Status (Cognition) oriented x 4   Pain Assessment   Patient Currently in Pain   (Patient reports no pain during session)   Integumentary/Edema   Integumentary/Edema Comments left foot covered from ankle to toes   Posture    Posture Not impaired   Range of Motion (ROM)   Range of Motion ROM deficits secondary to surgical procedure   Strength (Manual Muscle Testing)   Strength (Manual Muscle Testing) strength is WFL    Bed Mobility   Comment, (Bed Mobility) Independent   Transfers   Transfers sit-stand transfer   Sit-Stand Transfer   Sit-Stand [place] (Transfers) contact guard   Assistive Device (Sit-Stand Transfers) walker, front-wheeled   Gait/Stairs (Locomotion)   [place] Level (Gait) contact guard   Assistive Device (Gait) walker, front-wheeled   Maintains Weight-bearing Status (Gait) able to maintain   Balance   Balance Comments Patient currently requires walker with ambulation; requires bilateral UE support as patient is NWB at left LE   Sensory Examination   Sensory Perception Comments Patient with peripheral neuropathy   Coordination   Coordination no deficits were identified   Muscle Tone   Muscle Tone no deficits were identified   Clinical Impression   Criteria for Skilled Therapeutic Intervention Yes, treatment indicated   PT Diagnosis (PT) Impaired functional mobility   Influenced by the following impairments NWB at left LE, pain, decreased balance   Functional limitations due to impairments difficulties with gait, transfers   Clinical Presentation (PT Evaluation Complexity) stable   Clinical Presentation Rationale clinical judgement   Clinical Decision Making (Complexity) low complexity   Planned Therapy Interventions (PT) gait training;balance training;patient/family education;stair training;transfer training;progressive activity/exercise   Risk & Benefits of therapy have been explained evaluation/treatment results reviewed;care plan/treatment goals reviewed;risks/benefits reviewed;current/potential barriers reviewed;participants voiced agreement with care plan;participants included;patient   PT Total Evaluation Time   PT Eval, Low Complexity Minutes (84119) 10   Physical Therapy Goals   PT Frequency Daily   PT Predicted Duration/Target Date for Goal Attainment 06/20/24   PT Goals Transfers;Gait;Stairs   PT: Transfers Modified independent;Sit to/from stand;Bed to/from chair;Assistive device;Within  precautions   PT: Gait Modified independent;Within precautions;50 feet   PT: Stairs Supervision/stand-by assist;Assistive device;Within precautions;3 stairs;Rail on right   Interventions   Interventions Quick Adds Therapeutic Activity   Therapeutic Activity   Therapeutic Activities: dynamic activities to improve functional performance Minutes (99266) 23   Symptoms Noted During/After Treatment Fatigue   Treatment Detail/Skilled Intervention Patient supine upon arrival.  Educated in NWB at left LE (Podiatry verbally agreed to heel weight bearing during transfers and stair negotiation).  Patient donned left CAM boot with intermittent assist (patient has worn CAM boot before).  Facilitated transfer to standing with 2WW and CGA.  Facilitated amb 15 feet with 2WW and CGA; demonstrated NWB at left % of the time with gait.  Patient returned to supine with left LE elevated at end of session, bed alarm on and all needs within reach, RN updated.   PT Discharge Planning   PT Plan stairs, increase amb distance to 50 feet   PT Discharge Recommendation (DC Rec) home with assist   PT Rationale for DC Rec Patient presents below baseline for functional mobility.  Has demonstrated ability to maintain NWB at left LE during short distance ambulation with walker.  Plan to trial stair negotiation with rail/crutch/heel weight bearing tomorrow.  Has cane and crutches for home use.  Anticipate discharge to home with assist from spouse during stair negotiation and use of 2WW for mobility.   PT Brief overview of current status CGA with 2WW   PT Equipment Needed at Discharge walker, rolling   Total Session Time   Timed Code Treatment Minutes 23   Total Session Time (sum of timed and untimed services) 33

## 2024-06-19 NOTE — PROGRESS NOTES
[facility]  PROGRESS NOTE    CHIEF COMPLAINT:      I was asked by Albert Maguire MD  to evaluate this patient for left foot infection.    PATIENT HISTORY:  Crispin Rojas is a 64 year old male seen in follow up for the left foot. Had a TMA yesterday. States  feels okay today. Yesterday felt feverish following surgery. Some pain to foot laterally.         Review of Systems:  A 10 point review of systems was performed and is positive for that noted above in the patient history.  All other areas are negative.     PAST MEDICAL HISTORY:   Past Medical History:   Diagnosis Date    Antiplatelet or antithrombotic long-term use     Ascending aorta dilatation (H24)     Cerebral artery occlusion with cerebral infarction (H)     Cerebral infarction (H)     2010    Gastroesophageal reflux disease with esophagitis     H/O blood clots 2022    Hyperlipidemia LDL goal <70     Hypertension     Nonalcoholic steatohepatitis 11/30/2022    Numbness and tingling     Obesity     GILA (obstructive sleep apnea) 10/22/2018    CPAP intolerant    PVD (peripheral vascular disease) (H24)     s/p left partial toe amputation    Renal stone     Tremor     Type 2 diabetes mellitus with diabetic polyneuropathy, with long-term current use of insulin (H)         PAST SURGICAL HISTORY:   Past Surgical History:   Procedure Laterality Date    AMPUTATE FOOT Left 8/18/2016    Procedure: AMPUTATE FOOT;  Surgeon: Jack Younger DPM;  Location: [facility]    AMPUTATE TOE(S) Right 2/1/2016    Procedure: AMPUTATE TOE(S);  Surgeon: Rachelle Manriquez DPM, Pod;  Location: [facility]    AMPUTATE TOE(S) Right 8/2/2019    Procedure: Right fourth toe partial amputation for treatment of osteomyelitis.;  Surgeon: Jack Younger DPM;  Location: [facility]    AMPUTATE TOE(S) Left 11/8/2019    Procedure: Left second toe amputation at the metatarsophalangeal joint.;  Surgeon: Rachelle Manriquez DPM, Podiatry/Foot and Ankle Surgery;  Location: [facility]  OR    AMPUTATE TOE(S) Right 6/5/2022    Procedure: AMPUTATION OF RIGHT GREAT TOE;  Surgeon: Wili Quiles DPM;  Location: [facility]    AMPUTATE TOE(S) Right 7/28/2022    Procedure: Right foot partial first metatarsal resection;  Surgeon: Tiny Soto DPM;  Location: [facility]    ANGIOGRAM      BIOPSY BONE FOOT Right 7/24/2022    Procedure: Bone biopsy, right first metatarsal.;  Surgeon: Valentino Molina DPM;  Location: [facility]    COLONOSCOPY      COMBINED CYSTOSCOPY, RETROGRADES, URETEROSCOPY, INSERT STENT Left 8/21/2016    Procedure: COMBINED CYSTOSCOPY, RETROGRADES, URETEROSCOPY, INSERT STENT;  Surgeon: Artemio Valenzuela MD;  Location: [facility]    COMBINED CYSTOSCOPY, RETROGRADES, URETEROSCOPY, LASER HOLMIUM LITHOTRIPSY URETER(S), INSERT STENT Left 10/3/2016    Procedure: COMBINED CYSTOSCOPY, RETROGRADES, URETEROSCOPY, LASER HOLMIUM LITHOTRIPSY URETER(S), INSERT STENT;  Surgeon: Artemio Valenzuela MD;  Location: [facility]    EXTRACORPOREAL SHOCK WAVE LITHOTRIPSY (ESWL) Left 9/8/2016    Procedure: EXTRACORPOREAL SHOCK WAVE LITHOTRIPSY (ESWL);  Surgeon: Artemio Valenzuela MD;  Location: [facility]    INCISION AND DRAINAGE FOOT, COMBINED Right 7/24/2022    Procedure: Incision and drainage, right foot ;  Surgeon: Valentino Molina DPM;  Location: [facility]    IRRIGATION AND DEBRIDEMENT FOOT, COMBINED Left 10/19/2023    Procedure: Left foot second metatarsal resection , . Left plantar ulcer excisional debridement, down to and including tendon, with closure, site measuring 4 cm x 2 cm x 1 cm;  Surgeon: Tiny Soto DPM;  Location: [facility]    OSTEOTOMY FOOT Right 11/30/2022    Procedure: 1. Right second metatarsal floating osteotomy  2. Right second digit proximal phalanx arthroplasty 3. Right percutaneous flexor tenotomy;  Surgeon: Tiny Soto DPM;  Location: [facility]    OSTEOTOMY FOOT Right 1/19/2023    Procedure: Right foot third metatarsal head excision, ulcer debridement, matatarsal osteotomies;   Surgeon: Tiny Soto DPM;  Location: [facility]    RECESSION GASTROCNEMIUS Right 2/1/2016    Procedure: RECESSION GASTROCNEMIUS;  Surgeon: Rachelle Manriquez DPM, Pod;  Location: [facility]        MEDICATIONS:  Reviewed in Epic. Current.     ALLERGIES:    Allergies   Allergen Reactions    Statins Swelling     Other reaction(s): Other (see comments)  SIMCOR caused edema in extremities      Bactrim [Sulfamethoxazole-Trimethoprim] Nausea and Vomiting    Sulfatolamide Nausea and Vomiting    Niacin Swelling     SIMCOR caused edema in extremities    Simvastatin Swelling     SIMCOR caused edema in extremities    Sulfa Antibiotics Nausea        SOCIAL HISTORY:   Social History     Socioeconomic History    Marital status:      Spouse name: Sydney    Number of children: 2    Years of education: Not on file    Highest education level: Master's degree (e.g., MA, MS, [place], MEd, MSW, ELIANA)   Occupational History    Occupation: marketing     Employer: [employer]     Comment: [employer]   Tobacco Use    Smoking status: Never    Smokeless tobacco: Never   Vaping Use    Vaping status: Never Used   Substance and Sexual Activity    Alcohol use: Yes     Comment: rare---red wine 3x per month    Drug use: Never    Sexual activity: Not Currently     Partners: Female   Other Topics Concern    Parent/sibling w/ CABG, MI or angioplasty before 65F 55M? No   Social History Narrative    Not on file     Social Determinants of Health     Financial Resource Strain: Low Risk  (1/31/2024)    Financial Resource Strain     Within the past 12 months, have you or your family members you live with been unable to get utilities (heat, electricity) when it was really needed?: No   Food Insecurity: Low Risk  (1/31/2024)    Food Insecurity     Within the past 12 months, did you worry that your food would run out before you got money to buy more?: No     Within the past 12 months, did the food you bought just not last and you didn t have money to get more?: No    Transportation Needs: Low Risk  (1/31/2024)    Transportation Needs     Within the past 12 months, has lack of transportation kept you from medical appointments, getting your medicines, non-medical meetings or appointments, work, or from getting things that you need?: No   Physical Activity: Unknown (1/31/2024)    Exercise Vital Sign     Days of Exercise per Week: 4 days     Minutes of Exercise per Session: Not on file   Stress: No Stress Concern Present (1/31/2024)    [employer] - Occupational Stress Questionnaire     Feeling of Stress : Not at all   Social Connections: Moderately Integrated (1/31/2024)    Social Connection and Isolation Panel [NHANES]     Frequency of Communication with Friends and Family: Twice a week     Frequency of Social Gatherings with Friends and Family: Never     Attends Hinduism Services: More than 4 times per year     Active Member of Clubs or Organizations: Yes     Attends Club or Organization Meetings: More than 4 times per year     Marital Status:    Interpersonal Safety: Low Risk  (1/31/2024)    Interpersonal Safety     Do you feel physically and emotionally safe where you currently live?: Yes     Within the past 12 months, have you been hit, slapped, kicked or otherwise physically hurt by someone?: No     Within the past 12 months, have you been humiliated or emotionally abused in other ways by your partner or ex-partner?: No   Housing Stability: Low Risk  (1/31/2024)    Housing Stability     Do you have housing? : Yes     Are you worried about losing your housing?: No        FAMILY HISTORY:   Family History   Problem Relation Age of Onset    Arthritis Mother         SLE    Connective Tissue Disorder Mother         lupus    Diabetes Mother     Cerebrovascular Disease Mother     Cancer Mother     Diabetes Father     Coronary Artery Disease Father     Chronic Obstructive Pulmonary Disease Father     Diabetes Sister     Diabetes Maternal  "Grandfather         EXAM:Vitals: /43 (BP Location: Left arm)   Pulse 70   Temp 99  F (37.2  C) (Temporal)   Resp 25   Ht 1.753 m (5' 9\")   Wt 107.7 kg (237 lb 7 oz)   SpO2 94%   BMI 35.06 kg/m    BMI= Body mass index is 35.06 kg/m .    LABS:     Last Comprehensive Metabolic Panel:  Sodium   Date Value Ref Range Status   06/19/2024 139 135 - 145 mmol/L Final     Comment:     Reference intervals for this test were updated on 09/26/2023 to more accurately reflect our healthy population. There may be differences in the flagging of prior results with similar values performed with this method. Interpretation of those prior results can be made in the context of the updated reference intervals.    05/10/2021 139 133 - 144 mmol/L Final     Potassium   Date Value Ref Range Status   06/19/2024 4.2 3.4 - 5.3 mmol/L Final   07/26/2022 4.4 3.4 - 5.3 mmol/L Final   05/10/2021 3.6 3.4 - 5.3 mmol/L Final     Chloride   Date Value Ref Range Status   06/19/2024 103 98 - 107 mmol/L Final   07/25/2022 109 94 - 109 mmol/L Final   05/10/2021 108 94 - 109 mmol/L Final     Carbon Dioxide   Date Value Ref Range Status   05/10/2021 27 20 - 32 mmol/L Final     Carbon Dioxide (CO2)   Date Value Ref Range Status   06/19/2024 23 22 - 29 mmol/L Final   07/25/2022 25 20 - 32 mmol/L Final     Anion Gap   Date Value Ref Range Status   06/19/2024 13 7 - 15 mmol/L Final   07/25/2022 7 3 - 14 mmol/L Final   05/10/2021 4 3 - 14 mmol/L Final     Glucose   Date Value Ref Range Status   06/19/2024 159 (H) 70 - 99 mg/dL Final   07/25/2022 107 (H) 70 - 99 mg/dL Final   05/10/2021 68 (L) 70 - 99 mg/dL Final     GLUCOSE BY METER POCT   Date Value Ref Range Status   06/19/2024 183 (H) 70 - 99 mg/dL Final     Urea Nitrogen   Date Value Ref Range Status   06/19/2024 14.2 8.0 - 23.0 mg/dL Final   07/25/2022 16 7 - 30 mg/dL Final   05/10/2021 15 7 - 30 mg/dL Final     Creatinine   Date Value Ref Range Status   06/19/2024 1.04 0.67 - 1.17 mg/dL Final "   05/10/2021 0.86 0.66 - 1.25 mg/dL Final     GFR Estimate   Date Value Ref Range Status   06/19/2024 81 >60 mL/min/1.73m2 Final     Comment:     eGFR calculated using 2021 CKD-EPI equation.   05/10/2021 >90 >60 mL/min/[1.73_m2] Final     Comment:     Non  GFR Calc  Starting 12/18/2018, serum creatinine based estimated GFR (eGFR) will be   calculated using the Chronic Kidney Disease Epidemiology Collaboration   (CKD-EPI) equation.       Calcium   Date Value Ref Range Status   06/19/2024 9.3 8.8 - 10.2 mg/dL Final   05/10/2021 8.5 8.5 - 10.1 mg/dL Final     Lab Results   Component Value Date    WBC 6.7 06/17/2024    WBC 6.5 05/10/2021     Lab Results   Component Value Date    RBC 3.78 06/17/2024    RBC 4.14 05/10/2021     Lab Results   Component Value Date    HGB 10.8 06/17/2024    HGB 12.1 05/10/2021     Lab Results   Component Value Date    HCT 33.7 06/17/2024    HCT 37.8 05/10/2021     Lab Results   Component Value Date     06/17/2024     05/10/2021      Lab Results   Component Value Date    INR 1.08 11/07/2019    INR 1.04 08/19/2013        General appearance: Patient is alert and fully cooperative with history & exam.  No sign of distress is noted during the visit.      Respiratory: Breathing is regular & unlabored while sitting.      HEENT: Hearing is intact to spoken word.  Speech is clear.  No gross evidence of visual impairment that would impact ambulation.      Dermatologic: Left foot dressing changed: incision site intact. No cellulitis or dehiscence. Noted sanguinous drainage to inner layers. Previous ankle swelling reduced.      Vascular: Dorsalis pedis and posterior tibial pulses are intact & regular bilaterally.  CFT and skin temperature is normal to both lower extremities.       Neurologic: Lower extremity sensation is diminished, bilateral foot, to light touch.  No evidence of neurological-based weakness or contracture in the lower extremities.       Musculoskeletal:  Patient is ambulatory without an assistive device or brace. S/p left hallux amputation, second ray resection.    Psychiatric: Affect is pleasant & appropriate.      All cultures:  Recent Labs   Lab 06/18/24  1354 06/17/24  0522 06/17/24  0420   CULTURE See corresponding culture for results No growth after 1 day No growth after 1 day      Cultures:     Pathology:     IMAGING:   IMPRESSION: Prior amputation of the distal portion of the second metatarsal as well as prior amputation of the 1st digital at the metatarsal-phalangeal joint. Slight apparent worsening of lucent appearance of the 3rd metatarsal head, osteomyelitis not   entirely excluded. If clinically indicated consider further evaluation with MRI. Hammer toe deformities. Plantar surface calcaneal spur. Diffuse soft tissue swelling about the plantar soft tissues of the foot.    ASSESSMENT:  S/p left foot TMA on 6/18   Diabetes Mellitus 6.4%     MEDICAL DECISION MAKING:   -Discussed all findings with patient. Chart and imaging reviewed.   -Dressing changed, incision site intact and viable. Previous swelling and cellulitis now subsided.   -Discussed ongoing plan. Will work with PT today. NWB to LLE in CAM boot. Heel use for pivots/transfers/stairs.   -Recommend awaiting cultures prior to discharge. Can follow pathology outpatient.   -Discussed need to take time off of work and activities for the next 6 weeks.   -Okay to restart teresa.       Tiny Soto DPM   [facility]  [phone]

## 2024-06-20 ENCOUNTER — APPOINTMENT (OUTPATIENT)
Dept: OCCUPATIONAL THERAPY | Facility: CLINIC | Age: 64
End: 2024-06-20
Attending: STUDENT IN AN ORGANIZED HEALTH CARE EDUCATION/TRAINING PROGRAM
Payer: COMMERCIAL

## 2024-06-20 ENCOUNTER — APPOINTMENT (OUTPATIENT)
Dept: PHYSICAL THERAPY | Facility: CLINIC | Age: 64
End: 2024-06-20
Payer: COMMERCIAL

## 2024-06-20 LAB
ANION GAP SERPL CALCULATED.3IONS-SCNC: 15 MMOL/L (ref 7–15)
BUN SERPL-MCNC: 12 MG/DL (ref 8–23)
CALCIUM SERPL-MCNC: 9 MG/DL (ref 8.8–10.2)
CHLORIDE SERPL-SCNC: 106 MMOL/L (ref 98–107)
CREAT SERPL-MCNC: 0.99 MG/DL (ref 0.67–1.17)
CREAT SERPL-MCNC: 0.99 MG/DL (ref 0.67–1.17)
CRP SERPL-MCNC: 92.38 MG/L
DEPRECATED HCO3 PLAS-SCNC: 22 MMOL/L (ref 22–29)
EGFRCR SERPLBLD CKD-EPI 2021: 86 ML/MIN/1.73M2
EGFRCR SERPLBLD CKD-EPI 2021: 86 ML/MIN/1.73M2
GLUCOSE BLDC GLUCOMTR-MCNC: 136 MG/DL (ref 70–99)
GLUCOSE BLDC GLUCOMTR-MCNC: 146 MG/DL (ref 70–99)
GLUCOSE BLDC GLUCOMTR-MCNC: 159 MG/DL (ref 70–99)
GLUCOSE BLDC GLUCOMTR-MCNC: 174 MG/DL (ref 70–99)
GLUCOSE SERPL-MCNC: 135 MG/DL (ref 70–99)
GLUCOSE SERPL-MCNC: 135 MG/DL (ref 70–99)
POTASSIUM SERPL-SCNC: 4.2 MMOL/L (ref 3.4–5.3)
SODIUM SERPL-SCNC: 143 MMOL/L (ref 135–145)

## 2024-06-20 PROCEDURE — 36415 COLL VENOUS BLD VENIPUNCTURE: CPT | Performed by: INTERNAL MEDICINE

## 2024-06-20 PROCEDURE — 99255 IP/OBS CONSLTJ NEW/EST HI 80: CPT | Performed by: SPECIALIST

## 2024-06-20 PROCEDURE — 97165 OT EVAL LOW COMPLEX 30 MIN: CPT | Mod: GO

## 2024-06-20 PROCEDURE — 97116 GAIT TRAINING THERAPY: CPT | Mod: GP | Performed by: PHYSICAL THERAPIST

## 2024-06-20 PROCEDURE — 258N000003 HC RX IP 258 OP 636: Mod: JZ | Performed by: INTERNAL MEDICINE

## 2024-06-20 PROCEDURE — 120N000001 HC R&B MED SURG/OB

## 2024-06-20 PROCEDURE — 86140 C-REACTIVE PROTEIN: CPT | Performed by: STUDENT IN AN ORGANIZED HEALTH CARE EDUCATION/TRAINING PROGRAM

## 2024-06-20 PROCEDURE — 82947 ASSAY GLUCOSE BLOOD QUANT: CPT | Performed by: PODIATRIST

## 2024-06-20 PROCEDURE — 99232 SBSQ HOSP IP/OBS MODERATE 35: CPT | Performed by: STUDENT IN AN ORGANIZED HEALTH CARE EDUCATION/TRAINING PROGRAM

## 2024-06-20 PROCEDURE — 97530 THERAPEUTIC ACTIVITIES: CPT | Mod: GP | Performed by: PHYSICAL THERAPIST

## 2024-06-20 PROCEDURE — 97535 SELF CARE MNGMENT TRAINING: CPT | Mod: GO

## 2024-06-20 PROCEDURE — 82565 ASSAY OF CREATININE: CPT | Performed by: INTERNAL MEDICINE

## 2024-06-20 PROCEDURE — 250N000013 HC RX MED GY IP 250 OP 250 PS 637: Performed by: PODIATRIST

## 2024-06-20 PROCEDURE — 80048 BASIC METABOLIC PNL TOTAL CA: CPT | Performed by: STUDENT IN AN ORGANIZED HEALTH CARE EDUCATION/TRAINING PROGRAM

## 2024-06-20 PROCEDURE — 250N000013 HC RX MED GY IP 250 OP 250 PS 637: Performed by: INTERNAL MEDICINE

## 2024-06-20 PROCEDURE — 250N000011 HC RX IP 250 OP 636: Performed by: INTERNAL MEDICINE

## 2024-06-20 PROCEDURE — 250N000013 HC RX MED GY IP 250 OP 250 PS 637: Performed by: STUDENT IN AN ORGANIZED HEALTH CARE EDUCATION/TRAINING PROGRAM

## 2024-06-20 RX ADMIN — GABAPENTIN 400 MG: 400 CAPSULE ORAL at 08:43

## 2024-06-20 RX ADMIN — PIPERACILLIN AND TAZOBACTAM 4.5 G: 4; .5 INJECTION, POWDER, FOR SOLUTION INTRAVENOUS at 08:44

## 2024-06-20 RX ADMIN — ACETAMINOPHEN 975 MG: 325 TABLET, FILM COATED ORAL at 08:43

## 2024-06-20 RX ADMIN — APIXABAN 5 MG: 5 TABLET, FILM COATED ORAL at 20:15

## 2024-06-20 RX ADMIN — AMLODIPINE BESYLATE 10 MG: 10 TABLET ORAL at 08:43

## 2024-06-20 RX ADMIN — PIPERACILLIN AND TAZOBACTAM 4.5 G: 4; .5 INJECTION, POWDER, FOR SOLUTION INTRAVENOUS at 03:40

## 2024-06-20 RX ADMIN — PIPERACILLIN AND TAZOBACTAM 4.5 G: 4; .5 INJECTION, POWDER, FOR SOLUTION INTRAVENOUS at 14:52

## 2024-06-20 RX ADMIN — ATORVASTATIN CALCIUM 40 MG: 40 TABLET, FILM COATED ORAL at 20:15

## 2024-06-20 RX ADMIN — PANTOPRAZOLE SODIUM 40 MG: 40 TABLET, DELAYED RELEASE ORAL at 06:41

## 2024-06-20 RX ADMIN — PROPRANOLOL HYDROCHLORIDE 20 MG: 20 TABLET ORAL at 08:43

## 2024-06-20 RX ADMIN — PROPRANOLOL HYDROCHLORIDE 20 MG: 20 TABLET ORAL at 20:15

## 2024-06-20 RX ADMIN — PIPERACILLIN AND TAZOBACTAM 4.5 G: 4; .5 INJECTION, POWDER, FOR SOLUTION INTRAVENOUS at 22:07

## 2024-06-20 RX ADMIN — GABAPENTIN 400 MG: 400 CAPSULE ORAL at 20:15

## 2024-06-20 RX ADMIN — GABAPENTIN 400 MG: 400 CAPSULE ORAL at 14:52

## 2024-06-20 RX ADMIN — APIXABAN 5 MG: 5 TABLET, FILM COATED ORAL at 08:44

## 2024-06-20 RX ADMIN — VANCOMYCIN HYDROCHLORIDE 1500 MG: 5 INJECTION, POWDER, LYOPHILIZED, FOR SOLUTION INTRAVENOUS at 11:38

## 2024-06-20 ASSESSMENT — ACTIVITIES OF DAILY LIVING (ADL)
ADLS_ACUITY_SCORE: 22
ADLS_ACUITY_SCORE: 24
ADLS_ACUITY_SCORE: 22
ADLS_ACUITY_SCORE: 24
ADLS_ACUITY_SCORE: 22
ADLS_ACUITY_SCORE: 24
ADLS_ACUITY_SCORE: 22
ADLS_ACUITY_SCORE: 24
ADLS_ACUITY_SCORE: 22
ADLS_ACUITY_SCORE: 24
ADLS_ACUITY_SCORE: 22
ADLS_ACUITY_SCORE: 22
ADLS_ACUITY_SCORE: 24
ADLS_ACUITY_SCORE: 24
ADLS_ACUITY_SCORE: 22
ADLS_ACUITY_SCORE: 24
ADLS_ACUITY_SCORE: 22

## 2024-06-20 NOTE — CONSULTS
[facility]    Infectious Disease Consultation     Date of Admission:  6/17/2024  Date of Consult: 06/20/24    Assessment:  64YM with diabetes/polyneuropathy complicated by infectious complications of right great toe osteomyelitis and a chronic left foot plantar ulcer for several months, who has been admitted with worsening appearance of this ulcer with drainage and development of osteomyelitis and is now s/p L foot transmetatarsal amputation. Cxs are with no growth so far.    -Left diabetic foot infection / osteomyelitis s/p TMA. Hx of prior Left foot second metatarsal resection 10/19/2023  -Diabetes controlled HbA1c of 6.4%  -Prior infectious complications of right diabetic foot infection with pseudomonas , multiple amputations. Was on suppressive therapy with oral augmentin as out patient  -Chronic medical conditions - CKD, HTN, hx of CVA, hyperlipidemia, PAD, Sin, obesity    Recommendations:  Follow operative cxs. So far only staph caprae in cx  Maintain on Zosyn, discontinue Vancomycin  If no growth, discharge on oral Augmentin for another 7-10 days tomorrow  Follow with podiatry  No further suppressive antibiotics are indicated on a long term basis    Recommendations were discussed with the hospitalist service  Abby Lzoada MD    Reason for Consult   Reason for consult: I was asked to evaluate this patient for diabetic foot infection.    Primary Care Physician   Johann Serna    Chief Complaint   Left foot plantar ulcer with drainage and worsening appearance    History is obtained from the patient and medical records    History of Present Illness   Crispin Rojas is a 64 year old male with diabetes/polyneuropathy complicated by infectious complications of right great toe osteomyelitis and a chronic left foot plantar ulcer for several months with prior amputation, who has been admitted with worsening appearance of this ulcer with drainage and development of osteomyelitis and is now s/p L  foot transmetatarsal amputation. Cxs are with no growth so far.    He has had prior isolation of pseudomonas, E.fecalis, proteus mirabilis  and anaerobes from prior left foot wound and was on suppressive antibiotics with Augmentin.    He has been maintained on zosyn and Vancomycin, and  ID has been asked to assist with antibiotic management.    Past Medical History   I have reviewed this patient's medical history and updated it with pertinent information if needed.   Past Medical History:   Diagnosis Date    Antiplatelet or antithrombotic long-term use     Ascending aorta dilatation (H24)     Cerebral artery occlusion with cerebral infarction (H)     Cerebral infarction (H)     2010    Gastroesophageal reflux disease with esophagitis     H/O blood clots 2022    Hyperlipidemia LDL goal <70     Hypertension     Nonalcoholic steatohepatitis 11/30/2022    Numbness and tingling     Obesity     GILA (obstructive sleep apnea) 10/22/2018    CPAP intolerant    PVD (peripheral vascular disease) (H24)     s/p left partial toe amputation    Renal stone     Tremor     Type 2 diabetes mellitus with diabetic polyneuropathy, with long-term current use of insulin (H)        Past Surgical History   I have reviewed this patient's surgical history and updated it with pertinent information if needed.  Past Surgical History:   Procedure Laterality Date    AMPUTATE FOOT Left 8/18/2016    Procedure: AMPUTATE FOOT;  Surgeon: Jack Younger DPM;  Location: [facility]    AMPUTATE TOE(S) Right 2/1/2016    Procedure: AMPUTATE TOE(S);  Surgeon: Rachelle Manriquez DPM, Pod;  Location: [facility]    AMPUTATE TOE(S) Right 8/2/2019    Procedure: Right fourth toe partial amputation for treatment of osteomyelitis.;  Surgeon: Jack Younger DPM;  Location: [facility]    AMPUTATE TOE(S) Left 11/8/2019    Procedure: Left second toe amputation at the metatarsophalangeal joint.;  Surgeon: Rachelle Manriquez DPM, Podiatry/Foot and Ankle Surgery;  Location: [facility]     AMPUTATE TOE(S) Right 6/5/2022    Procedure: AMPUTATION OF RIGHT GREAT TOE;  Surgeon: Wili Quiles DPM;  Location: [facility]    AMPUTATE TOE(S) Right 7/28/2022    Procedure: Right foot partial first metatarsal resection;  Surgeon: Tiny Soto DPM;  Location: [facility]    AMPUTATE TOE(S) Left 6/18/2024    Procedure: Left foot transmetatarsal amputation;  Surgeon: Tiny Soto DPM;  Location: [facility]    ANGIOGRAM      BIOPSY BONE FOOT Right 7/24/2022    Procedure: Bone biopsy, right first metatarsal.;  Surgeon: Valentino Molina DPM;  Location: [facility]    COLONOSCOPY      COMBINED CYSTOSCOPY, RETROGRADES, URETEROSCOPY, INSERT STENT Left 8/21/2016    Procedure: COMBINED CYSTOSCOPY, RETROGRADES, URETEROSCOPY, INSERT STENT;  Surgeon: Artemio Valenzuela MD;  Location: [facility]    COMBINED CYSTOSCOPY, RETROGRADES, URETEROSCOPY, LASER HOLMIUM LITHOTRIPSY URETER(S), INSERT STENT Left 10/3/2016    Procedure: COMBINED CYSTOSCOPY, RETROGRADES, URETEROSCOPY, LASER HOLMIUM LITHOTRIPSY URETER(S), INSERT STENT;  Surgeon: Artemio Valenzuela MD;  [facility]: [facility]    EXTRACORPOREAL SHOCK WAVE LITHOTRIPSY (ESWL) Left 9/8/2016    Procedure: EXTRACORPOREAL SHOCK WAVE LITHOTRIPSY (ESWL);  Surgeon: Artemio Valenzuela MD;  Location: [facility]    INCISION AND DRAINAGE FOOT, COMBINED Right 7/24/2022    Procedure: Incision and drainage, right foot ;  Surgeon: Valentino Molina DPM;  [facility]: [facility]    IRRIGATION AND DEBRIDEMENT FOOT, COMBINED Left 10/19/2023    Procedure: Left foot second metatarsal resection , . Left plantar ulcer excisional debridement, down to and including tendon, with closure, site measuring 4 cm x 2 cm x 1 cm;  Surgeon: Tiny Soto DPM;  Location: [facility]    OSTEOTOMY FOOT Right 11/30/2022    Procedure: 1. Right second metatarsal floating osteotomy  2. Right second digit proximal phalanx arthroplasty 3. Right percutaneous flexor tenotomy;  Surgeon: Tiny Soto DPM;  Location: [facility]     OSTEOTOMY FOOT Right 1/19/2023    Procedure: Right foot third metatarsal head excision, ulcer debridement, matatarsal osteotomies;  Surgeon: Tiny Soto DPM;  Location: [facility]    RECESSION GASTROCNEMIUS Right 2/1/2016    Procedure: RECESSION GASTROCNEMIUS;  Surgeon: Rachelle Manriquez DPM, Pod;  Location: [facility]       Prior to Admission Medications   Prior to Admission Medications   Prescriptions Last Dose Informant Patient Reported? Taking?   Cholecalciferol (VITAMIN D3) 25 MCG (1000 UT) CAPS 6/16/2024 at am  Yes Yes   Sig: Take 1,000 Units by mouth daily    Co-Enzyme Q10 100 MG CAPS 6/16/2024 at am  Yes Yes   Sig: Take 100 mg by mouth daily    Continuous Blood Gluc Sensor ([employer] Ocean SoftwareYLE LUCERO 14 DAY SENSOR) MISC   No No   Sig: USE ONE EVERY 14 DAYS   ELIQUIS ANTICOAGULANT 5 MG tablet 6/16/2024 at pm  No Yes   Sig: Take 1 tablet (5 mg) by mouth 2 times daily   Multiple Vitamins-Minerals (MULTIVITAMIN PO) 6/16/2024 at am  Yes Yes   Sig: Take 1 tablet by mouth daily    Semaglutide, 2 MG/DOSE, (OZEMPIC) 8 MG/3ML pen 6/12/2024  No Yes   Sig: Inject 2 mg Subcutaneous every 7 days   amLODIPine (NORVASC) 5 MG tablet 6/16/2024 at am  Yes Yes   Sig: Take 10 mg by mouth daily   amoxicillin-clavulanate (AUGMENTIN) 875-125 MG tablet 6/16/2024 at am  Yes Yes   Sig: Take 1 tablet by mouth daily   atorvastatin (LIPITOR) 40 MG tablet 6/16/2024 at pm  No Yes   Sig: Take 1 tablet (40 mg) by mouth daily Take one tablet daily SEE PROVIDER FOR NEXT REFILL   blood glucose (NO BRAND SPECIFIED) lancets standard   No No   Sig: Use to test blood sugar 3 times daily or as directed.   calcium carbonate (OS-NARA) 500 MG tablet 6/16/2024 at pm  Yes Yes   Sig: Take 1 tablet by mouth 2 times daily   ferrous sulfate (FEROSUL) 325 (65 Fe) MG tablet 6/14/2024 at am  Yes Yes   Sig: Take 325 mg by mouth daily (with breakfast)   gabapentin (NEURONTIN) 100 MG capsule 6/16/2024 at pm  Yes Yes   Sig: Take 400 mg by mouth 3 times daily   gentamicin  (GARAMYCIN) 0.1 % external ointment 6/16/2024 at pm  No Yes   Sig: Apply 5g topically to wound with bandage changes.   hydrochlorothiazide (MICROZIDE) 12.5 MG capsule 6/16/2024 at am  No Yes   Sig: Take 1 capsule (12.5 mg) by mouth every morning   insulin aspart (NOVOLOG PEN) 100 UNIT/ML pen 6/16/2024 at pm  Yes Yes   Sig: Inject Subcutaneous 3 times daily (with meals) Inject 1 unit of insulin for every 9 grams of carbs   insulin degludec (TRESIBA) 100 UNIT/ML pen 6/16/2024 at am  Yes Yes   Sig: Inject 35-55 Units Subcutaneous every morning   insulin pen needle (B-D U/F) 31G X 5 MM miscellaneous   No No   Sig: USE TO INJECT LANTUS TWICE DAILY AND NOVOLOG THREE TIMES DAILY AS DIRECTED   ketoconazole (NIZORAL) 2 % external shampoo Past Month  No Yes   Sig: Apply topically daily as needed for itching or irritation See MD after 6 weeks   lisinopril (ZESTRIL) 40 MG tablet 6/16/2024 at am  No Yes   Sig: Take 1 tablet (40 mg) by mouth daily   magnesium oxide (MAG-OX) 400 MG tablet 6/16/2024 at pm  Yes Yes   Sig: Take 400 mg by mouth 3 times daily   metFORMIN (GLUCOPHAGE XR) 500 MG 24 hr tablet 6/16/2024 at pm  No Yes   Sig: TAKE 2 TABLETS(1000 MG) BY MOUTH TWICE DAILY   omeprazole (PRILOSEC) 40 MG DR capsule 6/16/2024 at am  Yes Yes   Sig: Take 40 mg by mouth daily   propranolol (INDERAL) 20 MG tablet 6/16/2024 at am  Yes Yes   Sig: Take 20 mg by mouth 2 times daily   tadalafil (CIALIS) 5 MG tablet Past Month  No Yes   Sig: TAKE 1 TABLET BY MOUTH EVERY DAY AS NEEDED one hour before intercourse      Facility-Administered Medications: None     Allergies   Allergies   Allergen Reactions    Statins Swelling     Other reaction(s): Other (see comments)  SIMCOR caused edema in extremities      Bactrim [Sulfamethoxazole-Trimethoprim] Nausea and Vomiting    Sulfatolamide Nausea and Vomiting    Niacin Swelling     SIMCOR caused edema in extremities    Simvastatin Swelling     SIMCOR caused edema in extremities    Sulfa Antibiotics  Nausea       Immunization History   Immunization History   Administered Date(s) Administered    COVID-19 12+ (2023-24) (Pfizer) 01/31/2024    COVID-19 Bivalent 18+ (Moderna) 01/09/2023    COVID-19 Monovalent 18+ (Moderna) 02/25/2021, 04/03/2021    COVID-19 Monovalent Booster 18+ (Moderna) 01/03/2022, 08/10/2022    Flu, Unspecified 11/04/2015    Hepatitis A (ADULT 19+) 01/31/2024    Influenza (IIV3) PF 12/02/2011, 10/29/2012, 11/11/2013    Influenza Vaccine 18-64 (Flublok) 10/11/2018, 10/21/2019, 01/08/2021, 01/03/2022, 01/09/2023, 01/31/2024    Influenza Vaccine >6 months,quad, PF 11/04/2015    Pneumococcal 20 valent Conjugate (Prevnar 20) 07/06/2022    Pneumococcal 23 valent 11/04/2015    RSV Vaccine (Abrysvo) 01/31/2024    TD,PF 7+ (Tenivac) 01/03/2006    TDAP (Adacel,Boostrix) 01/05/2022    TDAP Vaccine (Adacel) 11/04/2015    Twinrix A/B 11/30/2022    Zoster recombinant adjuvanted (SHINGRIX) 10/21/2019, 12/23/2019       Social History   I have reviewed this patient's social history and updated it with pertinent information if needed. Crispin AKASH Rojas  reports that he has never smoked. He has never used smokeless tobacco. He reports current alcohol use. He reports that he does not use drugs.    Family History   I have reviewed this patient's family history and updated it with pertinent information if needed.   Family History   Problem Relation Age of Onset    Arthritis Mother         SLE    Connective Tissue Disorder Mother         lupus    Diabetes Mother     Cerebrovascular Disease Mother     Cancer Mother     Diabetes Father     Coronary Artery Disease Father     Chronic Obstructive Pulmonary Disease Father     Diabetes Sister     Diabetes Maternal Grandfather        Review of Systems   The 10 point Review of Systems is as per HPI    Physical Exam   Temp: 97.9  F (36.6  C) Temp src: Temporal BP: (!) 145/61 Pulse: 64   Resp: 17 SpO2: 97 % O2 Device: None (Room air)    Vital Signs with Ranges  Temp:  [97.9  F  (36.6  C)-98.6  F (37  C)] 97.9  F (36.6  C)  Pulse:  [64-74] 64  Resp:  [17-20] 17  BP: (118-145)/(47-70) 145/61  SpO2:  [94 %-97 %] 97 %  237 lbs 6.97 oz  Body mass index is 35.06 kg/m .    GENERAL APPEARANCE:  awake  EYES: Eyes grossly normal to inspection  NECK: supple  RESP: non labord breathing  MS: L foot in surgical dressing  SKIN: no suspicious lesions or rashes        Data   All laboratory data reviewed  Component      Latest Ref Rn 6/20/2024  8:30 AM   Sodium      135 - 145 mmol/L 143    Potassium      3.4 - 5.3 mmol/L 4.2    Chloride      98 - 107 mmol/L 106    Carbon Dioxide (CO2)      22 - 29 mmol/L 22    Anion Gap      7 - 15 mmol/L 15    Urea Nitrogen      8.0 - 23.0 mg/dL 12.0    Creatinine      0.67 - 1.17 mg/dL 0.99    GFR Estimate      >60 mL/min/1.73m2 86    Calcium      8.8 - 10.2 mg/dL 9.0    Glucose      70 - 99 mg/dL 135 (H)    CRP Inflammation      <5.00 mg/L 92.38 (H)       Component      Latest Ref Rn 6/18/2024  6:23 AM 6/19/2024  6:00 AM   WBC      4.0 - 11.0 10e3/uL 4.2     RBC Count      4.40 - 5.90 10e6/uL 4.11 (L)     Hemoglobin      13.3 - 17.7 g/dL 11.5 (L)     Hematocrit      40.0 - 53.0 % 36.0 (L)     MCV      78 - 100 fL 88     MCH      26.5 - 33.0 pg 28.0     MCHC      31.5 - 36.5 g/dL 31.9     RDW      10.0 - 15.0 % 13.3     Platelet Count      150 - 450 10e3/uL 176     Vancomycin        ug/mL  9.1       Microbiology  06/18/2024 1354 06/19/2024 1547 Anaerobic Bacterial Culture Routine [[id number]]    Tissue from Foot, Left    Preliminary result Component Value   Culture No anaerobic organisms isolated after 1 day P             06/18/2024 1354 06/18/2024 1556 Gram Stain [[id number]]   Tissue from Foot, Left    Final result Component Value   GS Culture See corresponding culture for results   Gram Stain Result No organisms seen   Gram Stain Result No white blood cells seen          06/18/2024 1354 06/20/2024 1353 Tissue Aerobic Bacterial Culture Routine [[id number]]     (Abnormal)   Tissue from Foot, Left    Preliminary result Component Value   Culture Culture in progress P    1+ Staphylococcus caprae Abnormal  P    Identification is preliminary, confirmation in progress  Identification obtained by MALDI-TOF mass spectrometry research use only database. Test characteristics determined and verified by the [facility].             06/17/2024 0522 06/20/2024 0846 Blood Culture Peripheral Blood [[id number]]   Peripheral Blood    Preliminary result Component Value   Culture No growth after 3 days P             06/17/2024 0420 06/20/2024 0846 Blood Culture Peripheral Blood [[id number]]   Peripheral Blood    Preliminary result Component Value   Culture No growth after 3 days P

## 2024-06-20 NOTE — PROGRESS NOTES
"   06/20/24 1001   Appointment Info   Signing Clinician's Name / Credentials (OT) Sarah Nogueira, MS, OTR/L   Living Environment   People in Home spouse   Current Living Arrangements house   Living Environment Comments Wife works full time, patient will be at home alone during the day. All needs met on main level   Self-Care   Usual Activity Tolerance excellent   Current Activity Tolerance moderate   Equipment Currently Used at Home   (Has step in shower with built in tall bench and nonslip floor. Comfort height toilet)   Fall history within last six months no   Activity/Exercise/Self-Care Comment Pt reports at baseline he is independent in self-cares. 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 PMH  includes DM2, neuropathy, CVA   Existing Precautions/Restrictions fall;weight bearing  (Per order \"patient may heel weight bear minimally during transfers\")   Cognitive Status Examination   Affect/Mental Status (Cognitive) WFL   Safety Deficit minimal deficit   Visual Perception   Visual Impairment/Limitations WFL   Sensory   Sensory Comments Baseline neuropathy   Pain Assessment   Patient Currently in Pain Yes, see Vital Sign flowsheet   Transfers   Transfers toilet transfer;shower transfer   Shower Transfer   [place] Level (Shower Transfer) minimum assist (75% patient effort)   Toilet Transfer   [place] Level (Toilet Transfer) contact guard   Activities of Daily Living   BADL Assessment/Intervention lower body dressing;toileting   Lower Body " Dressing Assessment/Training   [place] Level (Lower Body Dressing) supervision   Toileting   [place] Level (Toileting) supervision   Clinical Impression   Criteria for Skilled Therapeutic Interventions Met (OT) Yes, treatment indicated   OT Diagnosis Decline function   OT Problem List-Impairments impacting ADL activity tolerance impaired;balance;mobility;post-surgical precautions;pain   Assessment of Occupational Performance 3-5 Performance Deficits   Identified Performance Deficits toileting, showering, functional mobility, strenuous IADLs   Planned Therapy Interventions (OT) ADL retraining;home program guidelines;progressive activity/exercise   Clinical Decision Making Complexity (OT) problem focused assessment/low complexity   Risk & Benefits of therapy have been explained evaluation/treatment results reviewed;care plan/treatment goals reviewed;current/potential barriers reviewed;risks/benefits reviewed;participants voiced agreement with care plan;participants included;patient   OT Total Evaluation Time   OT Eval, Low Complexity Minutes (60865) 9   OT Goals   Therapy Frequency (OT) One time eval and treatment   OT Predicted Duration/Target Date for Goal Attainment 06/21/24   OT Goals Transfers;OT Goal 1;Toilet Transfer/Toileting;OT Goal 2   OT: Transfer Supervision/stand-by assist;within precautions  (Step in shower transfer)   OT: Toilet Transfer/Toileting Supervision/stand-by assist;using adaptive equipment;within precautions;toilet transfer   OT: Goal 1 Pt will verbalize understanding of recommended selfcare adaptive equipment to enhance ADL/IADL safety and independence and where to obtain   OT: Goal 2 Pt will verbalize understanding of pain management strategies, fall prevention, energy conservation strategies to incorporate into routines   Interventions   Interventions Quick Adds Self-Care/Home Management   Self-Care/Home Management   Self-Care/Home Mgmt/ADL, Compensatory, Meal Prep Minutes (73051)  11   Symptoms Noted During/After Treatment (Meal Preparation/Planning Training) none   Treatment Detail/Skilled Intervention Upon arrival pt seated EOB and agreeable to therapy. Pt participated in education regarding pain management strategies, fall prevention, energy conservation strategies to incorporate into routines, vehicle transfer technique. Pt participated in education regarding benefits of reacher, walker tray, and toilet safety frame and where to obtain and was issued adaptive equipment handout. Pt gathered supplies for showering with SBA and cues for safety. Pt completed simulated toilet transfer and shower transfer with SBA and required one cue for walker use. Pt verbalized understanding of all education and denied questions or concerns regarding discharge.   OT Discharge Planning   OT Plan DC   OT Discharge Recommendation (DC Rec) home with assist   OT Rationale for DC Rec Anticipate that pt will discharge home with supervision with self-cares and assist with strenuous IADLs. Pt reports he can have this level of assist at discharge.   OT Brief overview of current status SBA selfcares. intermittent cues for safety   OT Equipment Needed at Discharge   (reacher. walker tray. toilet safety frame)     Occupational Therapy Discharge Summary    Reason for therapy discharge:    All goals and outcomes met, no further needs identified.    Progress towards therapy goal(s). See goals on Care Plan in [facility] electronic health record for goal details.  Goals met    Therapy recommendation(s):    No further therapy is recommended.

## 2024-06-20 NOTE — PROGRESS NOTES
[facility]  PROGRESS NOTE    CHIEF COMPLAINT:      I was asked by Albert Maguire MD  to evaluate this patient for left foot infection.    PATIENT HISTORY:  Crispin Rojas is a 64 year old male seen in follow up for the left foot. Had a TMA on 6/18. States pain improved. Feels better compared to yesterday. Getting ready to work with PT.         Review of Systems:  A 10 point review of systems was performed and is positive for that noted above in the patient history.  All other areas are negative.     PAST MEDICAL HISTORY:   Past Medical History:   Diagnosis Date    Antiplatelet or antithrombotic long-term use     Ascending aorta dilatation (H24)     Cerebral artery occlusion with cerebral infarction (H)     Cerebral infarction (H)     2010    Gastroesophageal reflux disease with esophagitis     H/O blood clots 2022    Hyperlipidemia LDL goal <70     Hypertension     Nonalcoholic steatohepatitis 11/30/2022    Numbness and tingling     Obesity     GILA (obstructive sleep apnea) 10/22/2018    CPAP intolerant    PVD (peripheral vascular disease) (H24)     s/p left partial toe amputation    Renal stone     Tremor     Type 2 diabetes mellitus with diabetic polyneuropathy, with long-term current use of insulin (H)         PAST SURGICAL HISTORY:   Past Surgical History:   Procedure Laterality Date    AMPUTATE FOOT Left 8/18/2016    Procedure: AMPUTATE FOOT;  Surgeon: Jack Younger DPM;  Location: [facility]    AMPUTATE TOE(S) Right 2/1/2016    Procedure: AMPUTATE TOE(S);  Surgeon: Rachelle Manriquez DPM, Pod;  Location: [facility]    AMPUTATE TOE(S) Right 8/2/2019    Procedure: Right fourth toe partial amputation for treatment of osteomyelitis.;  Surgeon: Jack Younger DPM;  Location: [facility]    AMPUTATE TOE(S) Left 11/8/2019    Procedure: Left second toe amputation at the metatarsophalangeal joint.;  Surgeon: Rachelle Manriquez DPM, Podiatry/Foot and Ankle Surgery;  Location: [facility]     AMPUTATE TOE(S) Right 6/5/2022    Procedure: AMPUTATION OF RIGHT GREAT TOE;  Surgeon: Wili Quiles DPM;  Location: [facility]    AMPUTATE TOE(S) Right 7/28/2022    Procedure: Right foot partial first metatarsal resection;  Surgeon: Tiny Soto DPM;  Location: [facility]    AMPUTATE TOE(S) Left 6/18/2024    Procedure: Left foot transmetatarsal amputation;  Surgeon: Tiny Soto DPM;  Location: [facility]    ANGIOGRAM      BIOPSY BONE FOOT Right 7/24/2022    Procedure: Bone biopsy, right first metatarsal.;  Surgeon: Valentino Molina DPM;  Location: [facility]    COLONOSCOPY      COMBINED CYSTOSCOPY, RETROGRADES, URETEROSCOPY, INSERT STENT Left 8/21/2016    Procedure: COMBINED CYSTOSCOPY, RETROGRADES, URETEROSCOPY, INSERT STENT;  Surgeon: Artemio Valenzuela MD;  Location: [facility]    COMBINED CYSTOSCOPY, RETROGRADES, URETEROSCOPY, LASER HOLMIUM LITHOTRIPSY URETER(S), INSERT STENT Left 10/3/2016    Procedure: COMBINED CYSTOSCOPY, RETROGRADES, URETEROSCOPY, LASER HOLMIUM LITHOTRIPSY URETER(S), INSERT STENT;  Surgeon: Artemio Valenzuela MD;  [facility]: [facility]    EXTRACORPOREAL SHOCK WAVE LITHOTRIPSY (ESWL) Left 9/8/2016    Procedure: EXTRACORPOREAL SHOCK WAVE LITHOTRIPSY (ESWL);  Surgeon: Artemio Valenzuela MD;  Location: [facility]    INCISION AND DRAINAGE FOOT, COMBINED Right 7/24/2022    Procedure: Incision and drainage, right foot ;  Surgeon: Valentino Molina DPM;  Location: [facility]    IRRIGATION AND DEBRIDEMENT FOOT, COMBINED Left 10/19/2023    Procedure: Left foot second metatarsal resection , . Left plantar ulcer excisional debridement, down to and including tendon, with closure, site measuring 4 cm x 2 cm x 1 cm;  Surgeon: Tiny Soto DPM;  Location: [facility]    OSTEOTOMY FOOT Right 11/30/2022    Procedure: 1. Right second metatarsal floating osteotomy  2. Right second digit proximal phalanx arthroplasty 3. Right percutaneous flexor tenotomy;  Surgeon: Tiny Soto DPM;  [facility]: [facility]     OSTEOTOMY FOOT Right 1/19/2023    Procedure: Right foot third metatarsal head excision, ulcer debridement, matatarsal osteotomies;  Surgeon: Tiny Soto DPM;  Location: [facility]    RECESSION GASTROCNEMIUS Right 2/1/2016    Procedure: RECESSION GASTROCNEMIUS;  Surgeon: Rachelle Manriquez DPM, Pod;  Location: [facility]        MEDICATIONS:  Reviewed in Epic. Current.     ALLERGIES:    Allergies   Allergen Reactions    Statins Swelling     Other reaction(s): Other (see comments)  SIMCOR caused edema in extremities      Bactrim [Sulfamethoxazole-Trimethoprim] Nausea and Vomiting    Sulfatolamide Nausea and Vomiting    Niacin Swelling     SIMCOR caused edema in extremities    Simvastatin Swelling     SIMCOR caused edema in extremities    Sulfa Antibiotics Nausea        SOCIAL HISTORY:   Social History     Socioeconomic History    Marital status:      Spouse name: Sydney    Number of children: 2    Years of education: Not on file    Highest education level: Master's degree (e.g., MA, MS, [place], MEd, MSW, ELIANA)   Occupational History    Occupation: marketing     Employer: [employer]     Comment: [employer]   Tobacco Use    Smoking status: Never    Smokeless tobacco: Never   Vaping Use    Vaping status: Never Used   Substance and Sexual Activity    Alcohol use: Yes     Comment: rare---red wine 3x per month    Drug use: Never    Sexual activity: Not Currently     Partners: Female   Other Topics Concern    Parent/sibling w/ CABG, MI or angioplasty before 65F 55M? No   Social History Narrative    Not on file     Social Determinants of Health     Financial Resource Strain: Low Risk  (1/31/2024)    Financial Resource Strain     Within the past 12 months, have you or your family members you live with been unable to get utilities (heat, electricity) when it was really needed?: No   Food Insecurity: Low Risk  (1/31/2024)    Food Insecurity     Within the past 12 months, did you worry that your food would run out before you got  money to buy more?: No     Within the past 12 months, did the food you bought just not last and you didn t have money to get more?: No   Transportation Needs: Low Risk  (1/31/2024)    Transportation Needs     Within the past 12 months, has lack of transportation kept you from medical appointments, getting your medicines, non-medical meetings or appointments, work, or from getting things that you need?: No   Physical Activity: Unknown (1/31/2024)    Exercise Vital Sign     Days of Exercise per Week: 4 days     Minutes of Exercise per Session: Not on file   Stress: No Stress Concern Present (1/31/2024)    [employer] [place] [place] [employer] - Occupational Stress Questionnaire     Feeling of Stress : Not at all   Social Connections: Moderately Integrated (1/31/2024)    Social Connection and Isolation Panel [NHANES]     Frequency of Communication with Friends and Family: Twice a week     Frequency of Social Gatherings with Friends and Family: Never     Attends Latter-day Services: More than 4 times per year     Active Member of Clubs or Organizations: Yes     Attends Club or Organization Meetings: More than 4 times per year     Marital Status:    Interpersonal Safety: Low Risk  (1/31/2024)    Interpersonal Safety     Do you feel physically and emotionally safe where you currently live?: Yes     Within the past 12 months, have you been hit, slapped, kicked or otherwise physically hurt by someone?: No     Within the past 12 months, have you been humiliated or emotionally abused in other ways by your partner or ex-partner?: No   Housing Stability: Low Risk  (1/31/2024)    Housing Stability     Do you have housing? : Yes     Are you worried about losing your housing?: No        FAMILY HISTORY:   Family History   Problem Relation Age of Onset    Arthritis Mother         SLE    Connective Tissue Disorder Mother         lupus    Diabetes Mother     Cerebrovascular Disease Mother     Cancer Mother     Diabetes Father  "    Coronary Artery Disease Father     Chronic Obstructive Pulmonary Disease Father     Diabetes Sister     Diabetes Maternal Grandfather         EXAM:Vitals: /70 (BP Location: Right arm)   Pulse 70   Temp 98.6  F (37  C) (Temporal)   Resp 17   Ht 1.753 m (5' 9\")   Wt 107.7 kg (237 lb 7 oz)   SpO2 96%   BMI 35.06 kg/m    BMI= Body mass index is 35.06 kg/m .    LABS:     Last Comprehensive Metabolic Panel:  Sodium   Date Value Ref Range Status   06/19/2024 139 135 - 145 mmol/L Final     Comment:     Reference intervals for this test were updated on 09/26/2023 to more accurately reflect our healthy population. There may be differences in the flagging of prior results with similar values performed with this method. Interpretation of those prior results can be made in the context of the updated reference intervals.    05/10/2021 139 133 - 144 mmol/L Final     Potassium   Date Value Ref Range Status   06/19/2024 4.2 3.4 - 5.3 mmol/L Final   07/26/2022 4.4 3.4 - 5.3 mmol/L Final   05/10/2021 3.6 3.4 - 5.3 mmol/L Final     Chloride   Date Value Ref Range Status   06/19/2024 103 98 - 107 mmol/L Final   07/25/2022 109 94 - 109 mmol/L Final   05/10/2021 108 94 - 109 mmol/L Final     Carbon Dioxide   Date Value Ref Range Status   05/10/2021 27 20 - 32 mmol/L Final     Carbon Dioxide (CO2)   Date Value Ref Range Status   06/19/2024 23 22 - 29 mmol/L Final   07/25/2022 25 20 - 32 mmol/L Final     Anion Gap   Date Value Ref Range Status   06/19/2024 13 7 - 15 mmol/L Final   07/25/2022 7 3 - 14 mmol/L Final   05/10/2021 4 3 - 14 mmol/L Final     Glucose   Date Value Ref Range Status   07/25/2022 107 (H) 70 - 99 mg/dL Final   05/10/2021 68 (L) 70 - 99 mg/dL Final     GLUCOSE BY METER POCT   Date Value Ref Range Status   06/19/2024 124 (H) 70 - 99 mg/dL Final     Urea Nitrogen   Date Value Ref Range Status   06/19/2024 14.2 8.0 - 23.0 mg/dL Final   07/25/2022 16 7 - 30 mg/dL Final   05/10/2021 15 7 - 30 mg/dL Final "     Creatinine   Date Value Ref Range Status   06/19/2024 1.04 0.67 - 1.17 mg/dL Final   05/10/2021 0.86 0.66 - 1.25 mg/dL Final     GFR Estimate   Date Value Ref Range Status   06/19/2024 81 >60 mL/min/1.73m2 Final     Comment:     eGFR calculated using 2021 CKD-EPI equation.   05/10/2021 >90 >60 mL/min/[1.73_m2] Final     Comment:     Non  GFR Calc  Starting 12/18/2018, serum creatinine based estimated GFR (eGFR) will be   calculated using the Chronic Kidney Disease Epidemiology Collaboration   (CKD-EPI) equation.       Calcium   Date Value Ref Range Status   06/19/2024 9.3 8.8 - 10.2 mg/dL Final   05/10/2021 8.5 8.5 - 10.1 mg/dL Final     Lab Results   Component Value Date    WBC 6.7 06/17/2024    WBC 6.5 05/10/2021     Lab Results   Component Value Date    RBC 3.78 06/17/2024    RBC 4.14 05/10/2021     Lab Results   Component Value Date    HGB 10.8 06/17/2024    HGB 12.1 05/10/2021     Lab Results   Component Value Date    HCT 33.7 06/17/2024    HCT 37.8 05/10/2021     Lab Results   Component Value Date     06/17/2024     05/10/2021      Lab Results   Component Value Date    INR 1.08 11/07/2019    INR 1.04 08/19/2013        General appearance: Patient is alert and fully cooperative with history & exam.  No sign of distress is noted during the visit.      Respiratory: Breathing is regular & unlabored while sitting.      HEENT: Hearing is intact to spoken word.  Speech is clear.  No gross evidence of visual impairment that would impact ambulation.      Dermatologic: Left foot dressing changed: incision site intact. No cellulitis or dehiscence. Noted sanguinous drainage to inner layers. Previous ankle swelling reduced.      Vascular: Dorsalis pedis and posterior tibial pulses are intact & regular bilaterally.  CFT and skin temperature is normal to both lower extremities.       Neurologic: Lower extremity sensation is diminished, bilateral foot, to light touch.  No evidence of  neurological-based weakness or contracture in the lower extremities.       Musculoskeletal: Patient is ambulatory without an assistive device or brace. S/p left hallux amputation, second ray resection.    Psychiatric: Affect is pleasant & appropriate.      All cultures:  Recent Labs   Lab 06/18/24  1354 06/17/24  0522 06/17/24  0420   CULTURE Culture in progress  No anaerobic organisms isolated after 1 day  See corresponding culture for results No growth after 2 days No growth after 2 days      Cultures:   Foot, Left; Tissue   0 Result Notes  Culture Culture in progress          Pathology:     IMAGING:   IMPRESSION: Prior amputation of the distal portion of the second metatarsal as well as prior amputation of the 1st digital at the metatarsal-phalangeal joint. Slight apparent worsening of lucent appearance of the 3rd metatarsal head, osteomyelitis not   entirely excluded. If clinically indicated consider further evaluation with MRI. Hammer toe deformities. Plantar surface calcaneal spur. Diffuse soft tissue swelling about the plantar soft tissues of the foot.    ASSESSMENT:  S/p left foot TMA on 6/18   Diabetes Mellitus 6.4%     MEDICAL DECISION MAKING:   -Discussed all findings with patient. Chart and imaging reviewed.   -Dressing changed: incision site viable and intact. Previous signs of infection resolved.  -Discussed continued plan. Dressing can stay on until follow up. Should stay clean and dry at all times.   -NWB to LLE. Heel use for pivots/transfers/stores. CAM boot at all times while out of bed.   -Recommend oral abx per cultures. Currently no growth. Has been on long term augmentin for suppressive treatment for previous osteomyelitis on the contralateral side. Defer current antibiotic plan to ID.   -Will provide outpatient follow up and instructions. Will sign off. Call with questions.       Tiny Soto DPM   [facility] Ankle [facility]  [phone]

## 2024-06-20 NOTE — PLAN OF CARE
"Goal Outcome Evaluation:      Plan of Care Reviewed With: patient    Overall Patient Progress: improvingOverall Patient Progress: improving    Outcome Evaluation: ID consulted. Vanco discontinued, but recommended to stay another night to complete course of IV zosyn. Plan to transition to oral antibiotics and discharge to home tomorrow.      Problem: Adult Inpatient Plan of Care  Goal: Plan of Care Review  Description: The Plan of Care Review/Shift note should be completed every shift.  The Outcome Evaluation is a brief statement about your assessment that the patient is improving, declining, or no change.  This information will be displayed automatically on your shift  note.  Outcome: Progressing  Flowsheets (Taken 6/20/2024 1825)  Outcome Evaluation: ID consulted. Vanco discontinued, but recommended to stay another night to complete course of IV zosyn. Plan to transition to oral antibiotics and discharge to home tomorrow.  Plan of Care Reviewed With: patient  Overall Patient Progress: improving  Goal: Patient-Specific Goal (Individualized)  Description: You can add care plan individualizations to a care plan. Examples of Individualization might be:  \"Parent requests to be called daily at 9am for status\", \"I have a hard time hearing out of my right ear\", or \"Do not touch me to wake me up as it startles  me\".  Outcome: Progressing  Goal: Absence of Hospital-Acquired Illness or Injury  Outcome: Progressing  Intervention: Identify and Manage Fall Risk  Recent Flowsheet Documentation  Taken 6/20/2024 0832 by Cr Hester, RN  Safety Promotion/Fall Prevention:   clutter free environment maintained   nonskid shoes/slippers when out of bed   safety round/check completed   patient and family education   room door open   room near nurse's station  Intervention: Prevent and Manage VTE (Venous Thromboembolism) Risk  Recent Flowsheet Documentation  Taken 6/20/2024 0800 by Cr Hester, RN  VTE Prevention/Management: " SCDs (sequential compression devices) off  Goal: Optimal Comfort and Wellbeing  Outcome: Progressing  Goal: Readiness for Transition of Care  Outcome: Progressing     Problem: Wound  Goal: Optimal Coping  Outcome: Progressing  Goal: Optimal Functional Ability  Outcome: Progressing  Goal: Absence of Infection Signs and Symptoms  Outcome: Progressing  Goal: Improved Oral Intake  Outcome: Progressing  Goal: Optimal Pain Control and Function  Outcome: Progressing  Goal: Skin Health and Integrity  Outcome: Progressing  Goal: Optimal Wound Healing  Outcome: Progressing     Problem: Comorbidity Management  Goal: Blood Glucose Levels Within Targeted Range  Outcome: Progressing     Problem: Lower Extremity Amputation  Goal: Optimal Adjustment to Amputation  Outcome: Progressing  Goal: Optimal Safe BADL Performance  Outcome: Progressing  Goal: Optimal Safe IADL Performance  Outcome: Progressing  Goal: Effective Lower Limb Care  Outcome: Progressing  Goal: Optimal Mobility [place] and Safety  Outcome: Progressing  Goal: Acceptable Pain/Hypersensitivity Control  Outcome: Progressing  Goal: Effective Prosthesis Use and Management  Outcome: Progressing

## 2024-06-20 NOTE — PLAN OF CARE
"Goal Outcome Evaluation:      Plan of Care Reviewed With: patient    Overall Patient Progress: improvingOverall Patient Progress: improving         Care Continued from 19:00-7:30    Events this shift    Orientation   /70 (BP Location: Right arm)   Pulse 70   Temp 98.6  F (37  C) (Temporal)   Resp 17   Ht 1.753 m (5' 9\")   Wt 107.7 kg (237 lb 7 oz)   SpO2 96%   BMI 35.06 kg/m    O2: RA   B  Pain: denies   Ambulation: A1 wgb  Gtts: SL      Plan:  Zosyn infused, Vancomycin infused. Discharge today depending on infectious disease results.       Problem: Adult Inpatient Plan of Care  Goal: Plan of Care Review  Description: The Plan of Care Review/Shift note should be completed every shift.  The Outcome Evaluation is a brief statement about your assessment that the patient is improving, declining, or no change.  This information will be displayed automatically on your shift  note.  Outcome: Progressing  Flowsheets (Taken 2024 0645)  Plan of Care Reviewed With: patient  Overall Patient Progress: improving  Goal: Patient-Specific Goal (Individualized)  Description: You can add care plan individualizations to a care plan. Examples of Individualization might be:  \"Parent requests to be called daily at 9am for status\", \"I have a hard time hearing out of my right ear\", or \"Do not touch me to wake me up as it startles  me\".  Outcome: Progressing  Goal: Absence of Hospital-Acquired Illness or Injury  Outcome: Progressing  Intervention: Identify and Manage Fall Risk  Recent Flowsheet Documentation  Taken 2024 by Tyrell Nogueira, RN  Safety Promotion/Fall Prevention:   activity supervised   clutter free environment maintained   nonskid shoes/slippers when out of bed   safety round/check completed  Intervention: Prevent Skin Injury  Recent Flowsheet Documentation  Taken 2024 by Tyrell Nogueira, RN  Body Position:   position changed independently   supine, legs elevated  Skin Protection:   " adhesive use limited   incontinence pads utilized  Device Skin Pressure Protection: absorbent pad utilized/changed  Intervention: Prevent and Manage VTE (Venous Thromboembolism) Risk  Recent Flowsheet Documentation  Taken 6/20/2024 0100 by Tyrell Nogueira RN  VTE Prevention/Management: SCDs (sequential compression devices) on  Intervention: Prevent Infection  Recent Flowsheet Documentation  Taken 6/20/2024 0100 by Tyrell Nogueira RN  Infection Prevention:   hand hygiene promoted   single patient room provided  Goal: Optimal Comfort and Wellbeing  Outcome: Progressing  Intervention: Monitor Pain and Promote Comfort  Recent Flowsheet Documentation  Taken 6/19/2024 2037 by Tyrell Nogueira RN  Pain Management Interventions:   cold applied   medication (see MAR)  Goal: Readiness for Transition of Care  Outcome: Progressing

## 2024-06-20 NOTE — PROGRESS NOTES
[facility]    Medicine Progress Note - Hospitalist Service    Date of Admission:  6/17/2024  Date of Service: 06/20/2024    Assessment & Plan     Chronic left diabetic foot ulcer  Left foot osteomyelitis:   Summary:  History of previous diabetic foot ulcers requiring multiple toe amputations.  Has had a left plantar foot ulcer in the area of the third metatarsal head for the last 8 months that has become much deeper with intermittent blood in purulent drainage over the last 6 to 8 weeks since he stopped going to the wound care clinic he says at the advice of his podiatrist.  There is a picture in Dr. Ji's note that shows out deep the ulcer goes likely to the bone.  There is significant purulent fluid coming out. Further eval suggested osteomyelitis and podiatry consulted.  They recommended surgery, S/p left foot TMA on 6/18   Plan:  -  Continue zosyn / Vancomycin stopped  - ID consulted  -  Pain control  -  Will defer to podiatry foot/surgical site cares ->  Dressing can stay on until follow up. Should stay clean and dry at all times. -> podiatry now signed off.  - NWB to LLE. Heel use for pivots/transfers/stores. CAM boot at all times while out of bed.      History of DVT: Chronically on Eliquis 5 mg twice daily.  -Resumed Eliquis 06/19     IDDM type II  Polyneuropathy: A1c 6.4.  He takes Ozempic weekly on Wednesdays, metformin XR 1000 mg twice daily, Tresiba usually 35-50 units in the morning.  He also uses 1 unit/9gm carb count with meals.  He has neuropathy, but does have some sensation in his feet.  -  Patient refused partial dose Tresiba this AM.   Now back from surgery and eating regular diet (he does not want diabetic diet).    Plan:  - Tresiba 35 units QAM  - Also adjusted carb count at his request from 1.5 U/15gm to his home 1 unit/9 gm.    - Otherwise continue sliding scale insulin.  -Hold metformin due to elevated lactic acid earlier in stay and hold Ozempic (consider resuming  "if cannot discharge soon)  -Resume PTA gabapentin 400 mg 3 times daily     CKD stage II: Creatinine baseline is likely 1-1.1 and he is 1.8 on admission. -> normalized with IVF     HTN  Essential tremor: He is on lisinopril 40 mg daily, HCTZ 12.5 mg daily, amlodipine 5 mg twice daily, and propranolol 20 mg daily.  - Resume lisinopril and hydrochlorothiazide   - Continue amlodipine and propranolol.     History CVA  PAD  HLD: History of lacunar stroke in 2010.  He also has peripheral chill disease and hyperlipidemia.  He is on Eliquis for previous DVT in addition to atorvastatin 40 mg daily.  -Resume Eliquis  -Atorvastatin     GILA: CPAP      Obesity: BMI 35, complicates care.     GERD: Substituted pantoprazole 40 mg daily for PTA omeprazole.     Chronic anemia: Hemoglobin at baseline of 10-11.     SNYDER    PPE Used:  Mask          Diet: Advance Diet as Tolerated: Regular Diet Adult  Diet    DVT Prophylaxis: DOAC  Mccarthy Catheter: Not present  Lines: None     Cardiac Monitoring: None  Code Status: Full Code      Clinically Significant Risk Factors                  # Hypertension: Noted on problem list              # Obesity: Estimated body mass index is 35.06 kg/m  as calculated from the following:    Height as of this encounter: 1.753 m (5' 9\").    Weight as of this encounter: 107.7 kg (237 lb 7 oz)., PRESENT ON ADMISSION            Disposition Plan     Medically Ready for Discharge: Anticipated Tomorrow     Yared Baker MD  Hospitalist Service  [facility]  Securely message with [employer] (more info)  Text page via [facility] Paging/y     Medical Decision Making       -------------------------- BILLING ON TIME ------------------------------------------------------------------------------------------------------------  40 MINUTES SPENT BY ME on the date of service doing chart review, history, exam, documentation & further activities per the note.  "     ______________________________________________________________________    Interval History     No acute events overnight  Pain controlled  No CP/SOB  No nausea / vomiting  No fevers  No new complaints    Physical Exam   Vital Signs: Temp: 97.9  F (36.6  C) Temp src: Temporal BP: (!) 145/61 Pulse: 64   Resp: 17 SpO2: 97 % O2 Device: None (Room air)    Weight: 237 lbs 6.97 oz    GEN:  no apparent distress  CV:  Regular rate and rhythm, no murmur. S1 + S2 noted, no S3 or S4.  LUNGS: CTABL  ABD:  Active bowel sounds, soft, non-tender/non-distended.  No guarding/rigidity.  EXT:  Left leg still edematous toward ankle, surgical site dressed.  Trace RLE edema.    SKIN:  Dry to touch, no new exanthems noted in the visualized areas.    Medical Decision Making       Over 50 MINUTES SPENT BY ME on the date of service doing chart review, history, exam, documentation & further activities per the note.      Data   Medications   Current Facility-Administered Medications   Medication Dose Route Frequency Provider Last Rate Last Admin     Current Facility-Administered Medications   Medication Dose Route Frequency Provider Last Rate Last Admin    acetaminophen (TYLENOL) tablet 975 mg  975 mg Oral Q8H Tiny Soto DPM   975 mg at 06/20/24 0843    amLODIPine (NORVASC) tablet 10 mg  10 mg Oral Daily Yared Baker MD   10 mg at 06/20/24 0843    apixaban ANTICOAGULANT (ELIQUIS) tablet 5 mg  5 mg Oral BID Yared Baker MD   5 mg at 06/20/24 0844    atorvastatin (LIPITOR) tablet 40 mg  40 mg Oral QPM Albert Maguire MD   40 mg at 06/19/24 2043    ferrous sulfate (FEROSUL) tablet 325 mg  325 mg Oral Q Mon Wed Fri AM Albert Maguire MD   325 mg at 06/19/24 0828    gabapentin (NEURONTIN) capsule 400 mg  400 mg Oral TID Albert Maguire MD   400 mg at 06/20/24 0843    hydrochlorothiazide (MICROZIDE) capsule 12.5 mg  12.5 mg Oral Yared Graff MD        insulin aspart (NovoLOG) injection (RAPID ACTING)    Subcutaneous TID w/meals Jeremy Valdes, DO   10 Units at 06/20/24 1415    insulin aspart (NovoLOG) injection (RAPID ACTING)  1-7 Units Subcutaneous TID AC Albert Maguire MD   1 Units at 06/20/24 1415    insulin aspart (NovoLOG) injection (RAPID ACTING)  1-5 Units Subcutaneous At Bedtime Albert Maguire MD        insulin degludec (TRESIBA) 100 UNIT/ML injection 35 Units  35 Units Subcutaneous QAM Jeremy Valdes DO   35 Units at 06/20/24 0844    lisinopril (ZESTRIL) tablet 40 mg  40 mg Oral Daily Yared Baker MD        pantoprazole (PROTONIX) EC tablet 40 mg  40 mg Oral QAM AC Albert Maguire MD   40 mg at 06/20/24 0641    piperacillin-tazobactam (ZOSYN) 4.5 g vial to attach to  mL bag  4.5 g Intravenous Q6H Albert Maguire  mL/hr at 06/20/24 0340 4.5 g at 06/20/24 0844    polyethylene glycol (MIRALAX) Packet 17 g  17 g Oral Daily Tiyn Soto DPM        propranolol (INDERAL) tablet 20 mg  20 mg Oral BID Albert Maguire MD   20 mg at 06/20/24 0843    senna-docusate (SENOKOT-S/PERICOLACE) 8.6-50 MG per tablet 1 tablet  1 tablet Oral BID Tiny Soto DPM        sodium chloride (PF) 0.9% PF flush 3 mL  3 mL Intracatheter Q8H Tiny Soto DPM   3 mL at 06/20/24 0844    sodium chloride (PF) 0.9% PF flush 3 mL  3 mL Intracatheter Q8H Albert Maguire MD   3 mL at 06/20/24 0641     Labs and Imaging results below reviewed today.  Recent Labs   Lab 06/18/24  0623 06/17/24  0420   WBC 4.2 6.7   HGB 11.5* 10.8*   HCT 36.0* 33.7*   MCV 88 89    205     7-Day Micro Results       Collected Updated Procedure Result Status      06/18/2024 1354 06/19/2024 1547 Anaerobic Bacterial Culture Routine [[id number]]    Tissue from Foot, Left    Preliminary result Component Value   Culture No anaerobic organisms isolated after 1 day  [P]                06/18/2024 1354 06/18/2024 1556 Gram Stain [[id number]]   Tissue from Foot, Left    Final result  Component Value   GS Culture See corresponding culture for results   Gram Stain Result No organisms seen   Gram Stain Result No white blood cells seen            06/18/2024 1354 06/20/2024 1353 Tissue Aerobic Bacterial Culture Routine [[id number]]    (Abnormal)   Tissue from Foot, Left    Preliminary result Component Value   Culture Culture in progress  [P]     1+ Staphylococcus caprae  [P]     Identification is preliminary, confirmation in progress  Identification obtained by MALDI-TOF mass spectrometry research use only database. Test characteristics determined and verified by the Infectious Diseases Diagnostic Laboratory.               06/17/2024 0522 06/20/2024 0846 Blood Culture Peripheral Blood [[id number]]   Peripheral Blood    Preliminary result Component Value   Culture No growth after 3 days  [P]                06/17/2024 0420 06/20/2024 0846 Blood Culture Peripheral Blood [[id number]]   Peripheral Blood    Preliminary result Component Value   Culture No growth after 3 days  [P]                      Recent Labs   Lab 06/20/24  1303 06/20/24  0830 06/20/24  0825 06/19/24  1323 06/19/24  0600 06/18/24  1110 06/18/24  0623   NA  --  143  --   --  139  --  139   POTASSIUM  --  4.2  --   --  4.2  --  4.4   CHLORIDE  --  106  --   --  103  --  104   CO2  --  22  --   --  23  --  23   ANIONGAP  --  15  --   --  13  --  12   * 135*  135* 136*   < > 159*   < > 171*   BUN  --  12.0  --   --  14.2  --  16.7   CR  --  0.99  0.99  --   --  1.04  --  0.94   GFRESTIMATED  --  86  86  --   --  81  --  >90   NARA  --  9.0  --   --  9.3  --  9.0    < > = values in this interval not displayed.     No results found for this or any previous visit (from the past 24 hour(s)).    TABBY FIGUEROA MD  [facility]  06/20/2024

## 2024-06-20 NOTE — PLAN OF CARE
Physical Therapy Discharge Summary     Reason for therapy discharge:    All goals and outcomes met, no further needs identified.     Progress towards therapy goal(s). See goals on Care Plan in [facility] electronic health record for goal details.  Goals met     Therapy recommendation(s):    Continue home exercise program.

## 2024-06-21 VITALS
BODY MASS INDEX: 35.17 KG/M2 | SYSTOLIC BLOOD PRESSURE: 149 MMHG | OXYGEN SATURATION: 97 % | HEART RATE: 59 BPM | WEIGHT: 237.44 LBS | DIASTOLIC BLOOD PRESSURE: 58 MMHG | HEIGHT: 69 IN | TEMPERATURE: 98 F | RESPIRATION RATE: 16 BRPM

## 2024-06-21 LAB
GLUCOSE BLDC GLUCOMTR-MCNC: 132 MG/DL (ref 70–99)
GLUCOSE BLDC GLUCOMTR-MCNC: 139 MG/DL (ref 70–99)
GLUCOSE BLDC GLUCOMTR-MCNC: 196 MG/DL (ref 70–99)
PATH REPORT.COMMENTS IMP SPEC: NORMAL
PATH REPORT.COMMENTS IMP SPEC: NORMAL
PATH REPORT.FINAL DX SPEC: NORMAL
PATH REPORT.GROSS SPEC: NORMAL
PATH REPORT.MICROSCOPIC SPEC OTHER STN: NORMAL
PATH REPORT.RELEVANT HX SPEC: NORMAL
PHOTO IMAGE: NORMAL

## 2024-06-21 PROCEDURE — 99232 SBSQ HOSP IP/OBS MODERATE 35: CPT | Performed by: SPECIALIST

## 2024-06-21 PROCEDURE — 250N000013 HC RX MED GY IP 250 OP 250 PS 637: Performed by: STUDENT IN AN ORGANIZED HEALTH CARE EDUCATION/TRAINING PROGRAM

## 2024-06-21 PROCEDURE — 250N000013 HC RX MED GY IP 250 OP 250 PS 637: Performed by: INTERNAL MEDICINE

## 2024-06-21 PROCEDURE — 250N000013 HC RX MED GY IP 250 OP 250 PS 637: Performed by: SPECIALIST

## 2024-06-21 PROCEDURE — 250N000011 HC RX IP 250 OP 636: Mod: JZ | Performed by: INTERNAL MEDICINE

## 2024-06-21 PROCEDURE — 99239 HOSP IP/OBS DSCHRG MGMT >30: CPT | Performed by: STUDENT IN AN ORGANIZED HEALTH CARE EDUCATION/TRAINING PROGRAM

## 2024-06-21 RX ADMIN — PROPRANOLOL HYDROCHLORIDE 20 MG: 20 TABLET ORAL at 08:18

## 2024-06-21 RX ADMIN — GABAPENTIN 400 MG: 400 CAPSULE ORAL at 13:39

## 2024-06-21 RX ADMIN — HYDROCHLOROTHIAZIDE 12.5 MG: 12.5 CAPSULE ORAL at 08:18

## 2024-06-21 RX ADMIN — PIPERACILLIN AND TAZOBACTAM 4.5 G: 4; .5 INJECTION, POWDER, FOR SOLUTION INTRAVENOUS at 03:57

## 2024-06-21 RX ADMIN — LISINOPRIL 40 MG: 40 TABLET ORAL at 08:18

## 2024-06-21 RX ADMIN — AMOXICILLIN AND CLAVULANATE POTASSIUM 1 TABLET: 875; 125 TABLET, FILM COATED ORAL at 10:18

## 2024-06-21 RX ADMIN — APIXABAN 5 MG: 5 TABLET, FILM COATED ORAL at 08:18

## 2024-06-21 RX ADMIN — AMLODIPINE BESYLATE 10 MG: 10 TABLET ORAL at 08:19

## 2024-06-21 RX ADMIN — GABAPENTIN 400 MG: 400 CAPSULE ORAL at 08:19

## 2024-06-21 RX ADMIN — FERROUS SULFATE TAB 325 MG (65 MG ELEMENTAL FE) 325 MG: 325 (65 FE) TAB at 08:18

## 2024-06-21 RX ADMIN — PANTOPRAZOLE SODIUM 40 MG: 40 TABLET, DELAYED RELEASE ORAL at 07:00

## 2024-06-21 ASSESSMENT — ACTIVITIES OF DAILY LIVING (ADL)
ADLS_ACUITY_SCORE: 22

## 2024-06-21 NOTE — PLAN OF CARE
"Goal Outcome Evaluation:      Plan of Care Reviewed With: patient    Overall Patient Progress: improvingOverall Patient Progress: improving    Outcome Evaluation: discharge home today on PO abx.    Pt A/O x4. VSS. PIV removed. Voiding well. Pain managed. NWB with cam boot. Started on PO abx. Plan is to discharge home today.     Reviewed discharge instructions with patient and spouse. Questions answered. Patient discharged to home with wife, discharge instructions, and belongings. Pt agree to discharge.     Problem: Adult Inpatient Plan of Care  Goal: Plan of Care Review  Description: The Plan of Care Review/Shift note should be completed every shift.  The Outcome Evaluation is a brief statement about your assessment that the patient is improving, declining, or no change.  This information will be displayed automatically on your shift  note.  Outcome: Met  Flowsheets (Taken 6/21/2024 5830)  Outcome Evaluation: discharge home today on PO abx.  Plan of Care Reviewed With: patient  Overall Patient Progress: improving  Goal: Patient-Specific Goal (Individualized)  Description: You can add care plan individualizations to a care plan. Examples of Individualization might be:  \"Parent requests to be called daily at 9am for status\", \"I have a hard time hearing out of my right ear\", or \"Do not touch me to wake me up as it startles  me\".  Outcome: Met  Goal: Absence of Hospital-Acquired Illness or Injury  Outcome: Met  Intervention: Identify and Manage Fall Risk  Recent Flowsheet Documentation  Taken 6/21/2024 0819 by Saba Schneider RN  Safety Promotion/Fall Prevention:   activity supervised   clutter free environment maintained   mobility aid in reach   nonskid shoes/slippers when out of bed   safety round/check completed  Intervention: Prevent Skin Injury  Recent Flowsheet Documentation  Taken 6/21/2024 0819 by Saba Schneider RN  Body Position: supine, head elevated  Intervention: Prevent and Manage VTE (Venous " Thromboembolism) Risk  Recent Flowsheet Documentation  Taken 6/21/2024 0819 by Saba Schneider RN  VTE Prevention/Management: SCDs (sequential compression devices) off  Intervention: Prevent Infection  Recent Flowsheet Documentation  Taken 6/21/2024 0819 by Saba Schneider RN  Infection Prevention:   rest/sleep promoted   single patient room provided  Goal: Optimal Comfort and Wellbeing  Outcome: Met  Intervention: Monitor Pain and Promote Comfort  Recent Flowsheet Documentation  Taken 6/21/2024 0819 by Saba Schneider RN  Pain Management Interventions: declines  Goal: Readiness for Transition of Care  Outcome: Met     Problem: Wound  Goal: Optimal Coping  Outcome: Met  Goal: Optimal Functional Ability  Outcome: Met  Intervention: Optimize Functional Ability  Recent Flowsheet Documentation  Taken 6/21/2024 0819 by Saba Schneider RN  Assistive Device Utilized: (cam boot)   walker   other (see comments)  Activity Management: activity adjusted per tolerance  Activity Assistance Provided: assistance, stand-by  Goal: Absence of Infection Signs and Symptoms  Outcome: Met  Goal: Improved Oral Intake  Outcome: Met  Goal: Optimal Pain Control and Function  Outcome: Met  Intervention: Prevent or Manage Pain  Recent Flowsheet Documentation  Taken 6/21/2024 0819 by Saba Schneider RN  Pain Management Interventions: declines  Goal: Skin Health and Integrity  Outcome: Met  Intervention: Optimize Skin Protection  Recent Flowsheet Documentation  Taken 6/21/2024 0819 by Saba Schneider RN  Activity Management: activity adjusted per tolerance  Head of Bed (HOB) Positioning: HOB at 20-30 degrees  Goal: Optimal Wound Healing  Outcome: Met     Problem: Comorbidity Management  Goal: Blood Glucose Levels Within Targeted Range  Outcome: Met  Intervention: Monitor and Manage Glycemia  Recent Flowsheet Documentation  Taken 6/21/2024 0819 by Saba Schneider RN  Glycemic Management: blood glucose  monitored  Medication Review/Management: medications reviewed     Problem: Lower Extremity Amputation  Goal: Optimal Adjustment to Amputation  Outcome: Met  Goal: Optimal Safe BADL Performance  Outcome: Met  Goal: Optimal Safe IADL Performance  Outcome: Met  Goal: Effective Lower Limb Care  Outcome: Met  Goal: Optimal Mobility [place] and Safety  Outcome: Met  Goal: Acceptable Pain/Hypersensitivity Control  Outcome: Met  Goal: Effective Prosthesis Use and Management  Outcome: Met

## 2024-06-21 NOTE — PROGRESS NOTES
[facility]    Infectious Disease Progress Note    Date of Service : 06/21/2024     Assessment:  64YM with diabetes/polyneuropathy complicated by infectious complications of right great toe osteomyelitis and a chronic left foot plantar ulcer for several months, who has been admitted with worsening appearance of this ulcer with drainage and development of osteomyelitis and is now s/p L foot transmetatarsal amputation. Cxs are with no growth so far, has had prior isolation of quinolone resistant pseudomonas, E.fecalis and proteus mirabilis from swab cx only on 2/20/2024 which did not grow out in tissue cxs.     -Left diabetic foot infection / osteomyelitis s/p TMA. Hx of prior Left foot second metatarsal resection 10/19/2023  -Diabetes controlled HbA1c of 6.4%  -Prior infectious complications of right diabetic foot infection with pseudomonas , multiple amputations. Was on suppressive therapy with oral augmentin as out patient  -Chronic medical conditions - CKD, HTN, hx of CVA, hyperlipidemia, PAD, Sin, obesity     Recommendations:  Follow tissue pathology  Discontinue Zosyn  Treat with oral Augmentin for another 10 days  Follow up with podiatry  Patient can discharge from the ID stand point, ID will sign off      Abby Lozada MD    Interval History   Has remained afebrile, pain is controlled, tolerating antibiotics without side effects, operative tissue cxs are only growing staph caprae so far, no new complaints    Physical Exam   Temp: 98  F (36.7  C) Temp src: Temporal BP: (!) 149/58 Pulse: 59   Resp: 16 SpO2: 97 % O2 Device: None (Room air)    Vitals:    06/17/24 0301   Weight: 107.7 kg (237 lb 7 oz)     Vital Signs with Ranges  Temp:  [98  F (36.7  C)-98.1  F (36.7  C)] 98  F (36.7  C)  Pulse:  [59-64] 59  Resp:  [16] 16  BP: (118-149)/(56-67) 149/58  SpO2:  [95 %-97 %] 97 %    GENERAL APPEARANCE:  awake  EYES: Eyes grossly normal to inspection  NECK: supple  RESP: non labord breathing  MS: L  foot in surgical dressing  SKIN: no suspicious lesions or rashes    Other:    Medications   Current Facility-Administered Medications   Medication Dose Route Frequency Provider Last Rate Last Admin     Current Facility-Administered Medications   Medication Dose Route Frequency Provider Last Rate Last Admin    amLODIPine (NORVASC) tablet 10 mg  10 mg Oral Daily Yared Baker MD   10 mg at 06/21/24 0819    apixaban ANTICOAGULANT (ELIQUIS) tablet 5 mg  5 mg Oral BID Yared Baker MD   5 mg at 06/21/24 0818    atorvastatin (LIPITOR) tablet 40 mg  40 mg Oral QPM Albert Maguire MD   40 mg at 06/20/24 2015    ferrous sulfate (FEROSUL) tablet 325 mg  325 mg Oral Q Mon Wed Fri AM Albert Maguire MD   325 mg at 06/21/24 0818    gabapentin (NEURONTIN) capsule 400 mg  400 mg Oral TID Albert Maguire MD   400 mg at 06/21/24 0819    hydrochlorothiazide (MICROZIDE) capsule 12.5 mg  12.5 mg Oral QAM Yared Baker MD   12.5 mg at 06/21/24 0818    insulin aspart (NovoLOG) injection (RAPID ACTING)   Subcutaneous TID w/meals Jeremy Valdes DO   9 Units at 06/21/24 0821    insulin aspart (NovoLOG) injection (RAPID ACTING)  1-7 Units Subcutaneous TID AC Albert Maguire MD   2 Units at 06/21/24 0821    insulin aspart (NovoLOG) injection (RAPID ACTING)  1-5 Units Subcutaneous At Bedtime Albert Maguire MD        insulin degludec (TRESIBA) 100 UNIT/ML injection 35 Units  35 Units Subcutaneous QAM Jeremy Valdes DO   35 Units at 06/21/24 0822    lisinopril (ZESTRIL) tablet 40 mg  40 mg Oral Daily Yared Baker MD   40 mg at 06/21/24 0818    pantoprazole (PROTONIX) EC tablet 40 mg  40 mg Oral QAM AC Albert Maguire MD   40 mg at 06/21/24 0700    piperacillin-tazobactam (ZOSYN) 4.5 g vial to attach to  mL bag  4.5 g Intravenous Q6H Albert Maguire  mL/hr at 06/20/24 0340 4.5 g at 06/21/24 0357    polyethylene glycol (MIRALAX) Packet 17 g  17 g Oral Daily Tiny Soto, PRAFULM         propranolol (INDERAL) tablet 20 mg  20 mg Oral BID Albert Maguire MD   20 mg at 06/21/24 0818    senna-docusate (SENOKOT-S/PERICOLACE) 8.6-50 MG per tablet 1 tablet  1 tablet Oral BID CadieuxTiny R, DPM        sodium chloride (PF) 0.9% PF flush 3 mL  3 mL Intracatheter Q8H Tiny Soto DPM   3 mL at 06/21/24 0823    sodium chloride (PF) 0.9% PF flush 3 mL  3 mL Intracatheter Q8H Albert Maguire MD   3 mL at 06/20/24 2207       Data   All microbiology laboratory data reviewed.  Recent Labs   Lab Test 06/18/24  0623 06/17/24  0420 01/31/24  1423   WBC 4.2 6.7 6.7   HGB 11.5* 10.8* 11.9*   HCT 36.0* 33.7* 37.6*   MCV 88 89 91    205 207     Recent Labs   Lab Test 06/20/24  0830 06/19/24  0600 06/18/24  0623   CR 0.99  0.99 1.04 0.94     Recent Labs   Lab Test 06/17/24  0420   SED 49*

## 2024-06-21 NOTE — DISCHARGE SUMMARY
"[facility]  Hospitalist Discharge Summary      Date of Admission:  6/17/2024  Date of Discharge:  6/21/2024  Discharging Provider: Yared Baker MD  Discharge Service: Hospitalist Service    Discharge Diagnoses     Chronic left diabetic foot ulcer  Left foot osteomyelitis:     Clinically Significant Risk Factors     # Obesity: Estimated body mass index is 35.06 kg/m  as calculated from the following:    Height as of this encounter: 1.753 m (5' 9\").    Weight as of this encounter: 107.7 kg (237 lb 7 oz).       Follow-ups Needed After Discharge   Follow-up Appointments     Follow-up and recommended labs and tests       Follow up with [facility] S[facility] D[facility], located at   [address] [place] [address] [address] [address]. Phone number is   [phone]. Call to schedule appt in 1-2 weeks.        Follow-up and recommended labs and tests       Follow up with primary care provider, Johann Serna, within 7 days for   hospital follow- up.  No follow up labs or test are needed.            Unresulted Labs Ordered in the Past 30 Days of this Admission       Date and Time Order Name Status Description    6/18/2024  1:55 PM Tissue Aerobic Bacterial Culture Routine Preliminary     6/18/2024  1:55 PM Anaerobic Bacterial Culture Routine Preliminary     6/18/2024  1:49 PM Surgical Pathology Exam In process     6/17/2024  5:22 AM Blood Culture Peripheral Blood Preliminary     6/17/2024  5:06 AM Blood Culture Peripheral Blood Preliminary           Discharge Disposition   Discharged to home  Condition at discharge: Stable    Hospital Course   Chronic left diabetic foot ulcer  Left foot osteomyelitis:   Summary:  History of previous diabetic foot ulcers requiring multiple toe amputations.  Has had a left plantar foot ulcer in the area of the third metatarsal head for the last 8 months that has become much deeper with intermittent blood in purulent drainage over the last 6 to 8 weeks since he stopped " going to the wound care clinic he says at the advice of his podiatrist.  There is a picture in Dr. Ji's note that shows out deep the ulcer goes likely to the bone.  There is significant purulent fluid coming out. Further eval suggested osteomyelitis and podiatry consulted.  They recommended surgery, S/p left foot TMA on 6/18   Plan:  -  Continue zosyn / Vancomycin stopped  -  ID consulted -> Treat with oral Augmentin for another 10 days at discharge  -  Pain control as needed  -  Will defer to podiatry foot/surgical site cares ->  Dressing can stay on until follow up. Should stay clean and dry at all times. -> podiatry now signed off.  - NWB to LLE. Heel use for pivots/transfers/stores. CAM boot at all times while out of bed.      History of DVT: Chronically on Eliquis 5 mg twice daily.  -Resumed Eliquis 06/19     IDDM type II  Polyneuropathy: A1c 6.4.  He takes Ozempic weekly on Wednesdays, metformin XR 1000 mg twice daily, Tresiba usually 35-50 units in the morning.  He also uses 1 unit/9gm carb count with meals.  He has neuropathy, but does have some sensation in his feet.  Plan:  -Resume PTA Regimen  -Resume PTA gabapentin 400 mg 3 times daily     CKD stage II: Creatinine baseline is likely 1-1.1 and he is 1.8 on admission. -> normalized with IVF     HTN  Essential tremor: He is on lisinopril 40 mg daily, HCTZ 12.5 mg daily, amlodipine 5 mg twice daily, and propranolol 20 mg daily.  - Resume lisinopril and hydrochlorothiazide   - Continue amlodipine and propranolol.     History CVA  PAD  HLD: History of lacunar stroke in 2010.  He also has peripheral chill disease and hyperlipidemia.  He is on Eliquis for previous DVT in addition to atorvastatin 40 mg daily.  -Resume Eliquis  -Atorvastatin     GILA: CPAP      Obesity: BMI 35, complicates care.     GERD: Substituted pantoprazole 40 mg daily for PTA omeprazole.     Chronic anemia: Hemoglobin at baseline of 10-11.     SNYDER    PPE Used:  Mask    Consultations This  Hospital Stay   PODIATRY IP CONSULT  PHARMACY TO DOSE VANCO  CARE MANAGEMENT / SOCIAL WORK IP CONSULT  PHYSICAL THERAPY ADULT IP CONSULT  INFECTIOUS DISEASES IP CONSULT  OCCUPATIONAL THERAPY ADULT IP CONSULT  SPIRITUAL HEALTH SERVICES IP CONSULT    Code Status   Full Code    Time Spent on this Encounter   I, Yared Baker MD, personally saw the patient today and spent greater than 30 minutes discharging this patient.       Yared Baker MD  [facility]  [address] [facility] [address]  Phone: [phone]  Fax: [phone]  ______________________________________________________________________    Physical Exam   Vital Signs: Temp: 98  F (36.7  C) Temp src: Temporal BP: (!) 149/58 Pulse: 59   Resp: 16 SpO2: 97 % O2 Device: None (Room air)    Weight: 237 lbs 6.97 oz  ----------------------------------------------------------------------------------------       Primary Care Physician   Johann Serna    Discharge Orders      Follow-up and recommended labs and tests     Follow up with Tiny Soto DPM at the [facility], located at [address] [place] [address] [address], [place] [address]. Phone number is [phone]. Call to schedule appt in 1-2 weeks.     Activity    NWB to LLE in surgical shoe. Elevate while at rest. Place ice behind knee for 15 minutes at a time.  Leave dressing to foot  at all times. Do not get wet in the shower.     No dressing changes needed until follow up. If dressing gets wet or has excessive drainage, please remove dressing. Cleanse site with saline. Pad dry and recover. Contact the office if you have any concerns.     Reason for your hospital stay    You had a worsening left foot infection and was evaluated by our Podiatry team and needed IV antibiotics and surgical intervention.     Follow-up and recommended labs and tests     Follow up with primary care provider, Johann Serna, within 7 days for hospital follow- up.  No follow up labs or test are needed.      Walker Order for DME - ONLY FOR DME     Diet    Follow this diet upon discharge: Orders Placed This Encounter      Advance Diet as Tolerated: Regular Diet Adult       Significant Results and Procedures   Most Recent 3 CBC's:  Recent Labs   Lab Test 06/18/24  0623 06/17/24  0420 01/31/24  1423   WBC 4.2 6.7 6.7   HGB 11.5* 10.8* 11.9*   MCV 88 89 91    205 207     Most Recent 3 BMP's:  Recent Labs   Lab Test 06/21/24  0752 06/21/24  0216 06/20/24  2146 06/20/24  1303 06/20/24  0830 06/19/24  1323 06/19/24  0600 06/18/24  1110 06/18/24  0623   NA  --   --   --   --  143  --  139  --  139   POTASSIUM  --   --   --   --  4.2  --  4.2  --  4.4   CHLORIDE  --   --   --   --  106  --  103  --  104   CO2  --   --   --   --  22  --  23  --  23   BUN  --   --   --   --  12.0  --  14.2  --  16.7   CR  --   --   --   --  0.99  0.99  --  1.04  --  0.94   ANIONGAP  --   --   --   --  15  --  13  --  12   NARA  --   --   --   --  9.0  --  9.3  --  9.0   * 132* 159*   < > 135*  135*   < > 159*   < > 171*    < > = values in this interval not displayed.     Most Recent 2 LFT's:  Recent Labs   Lab Test 01/31/24  1423 07/10/23  0814   AST 57* 47*   ALT 67 52   ALKPHOS 61 62   BILITOTAL 0.3 0.2     Most Recent 3 INR's:  Recent Labs   Lab Test 11/07/19  1817   INR 1.08     7-Day Micro Results       Collected Updated Procedure Result Status      06/18/2024 1354 06/20/2024 1547 Anaerobic Bacterial Culture Routine [[id number]]    Tissue from Foot, Left    Preliminary result Component Value   Culture No anaerobic organisms isolated after 2 days  [P]                06/18/2024 1354 06/18/2024 1556 Gram Stain [[id number]]   Tissue from Foot, Left    Final result Component Value   GS Culture See corresponding culture for results   Gram Stain Result No organisms seen   Gram Stain Result No white blood cells seen            06/18/2024 1354 06/21/2024 1019 Tissue Aerobic Bacterial Culture Routine [[id number]]    (Abnormal)    Tissue from Foot, Left    Preliminary result Component Value   Culture Culture in progress  [P]     1+ Staphylococcus caprae  [P]     Identification obtained by MALDI-TOF mass spectrometry research use only database. Test characteristics determined and verified by the [facility].               06/17/2024 0522 06/21/2024 0846 Blood Culture Peripheral Blood [[id number]]   Peripheral Blood    Preliminary result Component Value   Culture No growth after 4 days  [P]                06/17/2024 0420 06/21/2024 0846 Blood Culture Peripheral Blood [[id number]]   Peripheral Blood    Preliminary result Component Value   Culture No growth after 4 days  [P]                      Most Recent Urinalysis:  Recent Labs   Lab Test 05/10/21  1555 06/23/18  1005   COLOR Light Yellow Yellow   APPEARANCE Clear Clear   URINEGLC Negative Negative   URINEBILI Negative Negative   URINEKETONE Negative Trace*   SG 1.014 1.020   UBLD Negative Negative   URINEPH 7.0 6.0   PROTEIN Negative Negative   UROBILINOGEN  --  0.2   NITRITE Negative Negative   LEUKEST Negative Negative   RBCU <1 O - 2   WBCU <1 0 - 5     Most Recent ESR & CRP:  Recent Labs   Lab Test 06/20/24  0830 06/17/24  0420 07/26/22  0600 07/22/22  1705   SED  --  49*   < > 95*   CRP  --   --   --  70.9*   CRPI 92.38* 60.32*   < >  --     < > = values in this interval not displayed.   ,   Results for orders placed or performed during the hospital encounter of 06/17/24   Foot XR, G/E 3 views, left    Narrative    EXAM: XR FOOT LEFT G/E 3 VIEWS  LOCATION: [facility]  DATE: 6/17/2024    INDICATION: worsening diabetic ulcer, bone exposed  COMPARISON: Foot radiographs 10/19/2023 and 9/13/2023, MRI foot 10/16/2023      Impression    IMPRESSION: Prior amputation of the distal portion of the second metatarsal as well as prior amputation of the 1st digital at the metatarsal-phalangeal joint. Slight apparent worsening of lucent  appearance of the 3rd metatarsal head, osteomyelitis not   entirely excluded. If clinically indicated consider further evaluation with MRI. Hammer toe deformities. Plantar surface calcaneal spur. Diffuse soft tissue swelling about the plantar soft tissues of the foot.    ?     US Lower Extremity Venous Duplex Left    Narrative    EXAM: US LOWER EXTREMITY VENOUS DUPLEX LEFT  LOCATION: [facility]  DATE: 6/17/2024    INDICATION: left leg edema  COMPARISON: None.  TECHNIQUE: Venous Duplex ultrasound of the left lower extremity with and without compression, augmentation and duplex. Color flow and spectral Doppler with waveform analysis performed.    FINDINGS: Exam includes the common femoral, femoral, popliteal, and contralateral common femoral veins as well as segmentally visualized deep calf veins and greater saphenous vein.     LEFT: No deep vein thrombosis. No superficial thrombophlebitis. No popliteal cyst.      Impression    IMPRESSION:  1.  No deep venous thrombosis in the left lower extremity.   MR Foot Left w/o Contrast    Narrative    Exam: MRI of the left forefoot and midfoot dated 6/18/2024.    COMPARISON: 6/17/2024.    CLINICAL HISTORY: Chronic plantar foot ulcer near the third metatarsal  head. Evaluate for osteomyelitis.    TECHNIQUE: Multiplanar, multisequence MR imaging of the left forefoot  and midfoot was obtained using standard sequences in 3 planes without  the use of intravenous or intra-articular gadolinium contrast.    FINDINGS:    Foci of metallic artifact adjacent to the third metatarsophalangeal  joint.    Diffuse bone marrow edema within the majority of the third metatarsal  as well as in the third toe proximal phalanx. There is low T1 signal  throughout the mid to distal third metatarsal with subtle low T1  signal in the third toe proximal phalanx. Subtle increased T2 signal  with subtle low T1 signal in the distal fourth metatarsal and adjacent  fourth toe proximal  phalanx. Very subtle increased T2 signal in the  previously amputated second metatarsal without corresponding low T1  signal.    Nonspecific bone marrow edema in the cuboid with minimal low T1  signal.    Diffuse subcutaneous soft tissue edema along the dorsal aspect of the  foot with diffuse edema throughout the visualized musculature.    No discrete fluid collection.    The Lisfranc ligament is intact and the Lisfranc joint is congruent.    No full-thickness tendon tear or tendon retraction.    Mild atrophy within the soft tissues.    Degenerative changes at the fourth tarsometatarsal joint.      Impression    IMPRESSION:  1. Postsurgical changes of prior amputation of the left great toe, and  the mid to distal second metatarsal and second toe.    2. Bone marrow edema with low T1 signal involving the mid to distal  third metatarsal as well as the adjacent third toe proximal phalanx.  MRI findings consistent with osteomyelitis.    3. Subtle bone marrow edema in the distal fourth metatarsal as well as  in the adjacent fourth toe proximal phalanx with question of very low  T1 signal, MRI findings concerning for osteomyelitis versus osteitis.    4. Subtle bone marrow edema in the remaining actually resected second  metatarsal, without low T1 signal, findings presumed to be  postsurgical versus osteitis.    5. Diffuse subcutaneous soft tissue edema as well as edema within the  musculature without discrete fluid collection or abscess. Findings  presumed to represent cellulitis and myositis.    6. Foci of low signal along the dorsal aspect of the third metatarsal,  presumed metallic artifact.    7. Bone marrow edema in the cuboid, nonspecific. Correlate clinically  for ulcer in this region versus stress changes.    YSABEL LINTON MD         SYSTEM ID:  [id number]   XR Foot Port Left 2 Views    Narrative    XR FOOT PORT LEFT 2 VIEWS   6/18/2024 2:49 PM     HISTORY: s/p transmetatarsal amputation  COMPARISON: MRI 6/18/2024        Impression    IMPRESSION: Normal first through fifth transmetatarsal amputation.  Soft tissue swelling. No acute fracture.    LUIS E GUTIERREZ MD         SYSTEM ID:  [id number]     *Note: Due to a large number of results and/or encounters for the requested time period, some results have not been displayed. A complete set of results can be found in Results Review.       Discharge Medications   Current Discharge Medication List        CONTINUE these medications which have CHANGED    Details   amoxicillin-clavulanate (AUGMENTIN) 875-125 MG tablet Take 1 tablet by mouth daily for 10 days  Qty: 10 tablet, Refills: 0    Associated Diagnoses: Osteomyelitis of left foot, unspecified type (H); Diabetic ulcer of left midfoot associated with diabetes mellitus of other type, with bone involvement without evidence of necrosis (H)           CONTINUE these medications which have NOT CHANGED    Details   amLODIPine (NORVASC) 5 MG tablet Take 10 mg by mouth daily      atorvastatin (LIPITOR) 40 MG tablet Take 1 tablet (40 mg) by mouth daily Take one tablet daily SEE PROVIDER FOR NEXT REFILL  Qty: 90 tablet, Refills: 4    Associated Diagnoses: Hyperlipidemia LDL goal <70      calcium carbonate (OS-NARA) 500 MG tablet Take 1 tablet by mouth 2 times daily      Cholecalciferol (VITAMIN D3) 25 MCG (1000 UT) CAPS Take 1,000 Units by mouth daily       Co-Enzyme Q10 100 MG CAPS Take 100 mg by mouth daily       ELIQUIS ANTICOAGULANT 5 MG tablet Take 1 tablet (5 mg) by mouth 2 times daily  Qty: 180 tablet, Refills: 4    Associated Diagnoses: Personal history of DVT (deep vein thrombosis)      ferrous sulfate (FEROSUL) 325 (65 Fe) MG tablet Take 325 mg by mouth daily (with breakfast)      gabapentin (NEURONTIN) 100 MG capsule Take 400 mg by mouth 3 times daily      gentamicin (GARAMYCIN) 0.1 % external ointment Apply 5g topically to wound with bandage changes.  Qty: 30 g, Refills: 2    Associated Diagnoses: Cellulitis of lower extremity,  unspecified laterality      hydrochlorothiazide (MICROZIDE) 12.5 MG capsule Take 1 capsule (12.5 mg) by mouth every morning  Qty: 90 capsule, Refills: 4    Associated Diagnoses: Benign essential hypertension      insulin aspart (NOVOLOG PEN) 100 UNIT/ML pen Inject Subcutaneous 3 times daily (with meals) Inject 1 unit of insulin for every 9 grams of carbs      insulin degludec (TRESIBA) 100 UNIT/ML pen Inject 35-55 Units Subcutaneous every morning      ketoconazole (NIZORAL) 2 % external shampoo Apply topically daily as needed for itching or irritation See MD after 6 weeks  Qty: 120 mL, Refills: 1    Associated Diagnoses: Tinea capitis due to trichophyton      lisinopril (ZESTRIL) 40 MG tablet Take 1 tablet (40 mg) by mouth daily  Qty: 90 tablet, Refills: 4    Associated Diagnoses: Benign essential hypertension      magnesium oxide (MAG-OX) 400 MG tablet Take 400 mg by mouth 3 times daily      metFORMIN (GLUCOPHAGE XR) 500 MG 24 hr tablet TAKE 2 TABLETS(1000 MG) BY MOUTH TWICE DAILY  Qty: 360 tablet, Refills: 0    Associated Diagnoses: Type 2 diabetes mellitus with peripheral neuropathy (H)      Multiple Vitamins-Minerals (MULTIVITAMIN PO) Take 1 tablet by mouth daily       omeprazole (PRILOSEC) 40 MG DR capsule Take 40 mg by mouth daily      propranolol (INDERAL) 20 MG tablet Take 20 mg by mouth 2 times daily      Semaglutide, 2 MG/DOSE, (OZEMPIC) 8 MG/3ML pen Inject 2 mg Subcutaneous every 7 days  Qty: 9 mL, Refills: 3    Associated Diagnoses: Type 2 diabetes mellitus with peripheral neuropathy (H)      tadalafil (CIALIS) 5 MG tablet TAKE 1 TABLET BY MOUTH EVERY DAY AS NEEDED one hour before intercourse  Qty: 30 tablet, Refills: 1    Associated Diagnoses: Erectile dysfunction due to diseases classified elsewhere      blood glucose (NO BRAND SPECIFIED) lancets standard Use to test blood sugar 3 times daily or as directed.  Qty: 300 each, Refills: 3    Associated Diagnoses: Type 2 diabetes mellitus with diabetic  neuropathy, with long-term current use of insulin (H)      Continuous Blood Gluc Sensor (FREESTYLE LUCERO 14 DAY SENSOR) MISC USE ONE EVERY 14 DAYS  Qty: 6 each, Refills: 3    Associated Diagnoses: Type 2 diabetes mellitus with peripheral neuropathy (H)      insulin pen needle (B-D U/F) 31G X 5 MM miscellaneous USE TO INJECT LANTUS TWICE DAILY AND NOVOLOG THREE TIMES DAILY AS DIRECTED  Qty: 500 each, Refills: 3    Associated Diagnoses: Type 2 diabetes mellitus with peripheral neuropathy (H)           Allergies   Allergies   Allergen Reactions    Statins Swelling     Other reaction(s): Other (see comments)  SIMCOR caused edema in extremities      Bactrim [Sulfamethoxazole-Trimethoprim] Nausea and Vomiting    Sulfatolamide Nausea and Vomiting    Niacin Swelling     SIMCOR caused edema in extremities    Simvastatin Swelling     SIMCOR caused edema in extremities    Sulfa Antibiotics Nausea

## 2024-06-21 NOTE — PLAN OF CARE
"Goal Outcome Evaluation:      Plan of Care Reviewed With: patient    Overall Patient Progress: improvingOverall Patient Progress: improving    Outcome Evaluation: Patient denies pain. Zosyn received x2 overnight. Pt is NWB, ambulating with walker. Pt will discharge home on PO abx.    Neuro: Alert and Oriented x4  Activity: are SBA with Walker  Pain: denying pain.  Labs / Tests: Blood glucose: 159, 132  GI: bowel sounds audible. Last BM 6/20/2024  : voiding.  Medications: Abx: zosyn.  LDA's: Peripheral  Fluids: is Saline locked.  Diet: Regular   Consults: ID  Discharge Disposition: Home with Self care, PO Augmentin      Problem: Adult Inpatient Plan of Care  Goal: Plan of Care Review  Description: The Plan of Care Review/Shift note should be completed every shift.  The Outcome Evaluation is a brief statement about your assessment that the patient is improving, declining, or no change.  This information will be displayed automatically on your shift  note.  6/21/2024 0609 by Priscila Villar RN  Outcome: Progressing  Flowsheets (Taken 6/21/2024 0609)  Outcome Evaluation: Patient denies pain. Zosyn received x2 overnight. Pt is NWB, ambulating with walker. Pt will discharge home on PO abx.  Plan of Care Reviewed With: patient  Overall Patient Progress: improving  6/21/2024 0609 by Priscila Villar RN  Outcome: Not Progressing  Flowsheets (Taken 6/21/2024 0609)  Outcome Evaluation: Patient denies pain. Zosyn received x2 overnight. Pt is NWB, ambulating with walker. Pt will discharge home on PO abx.  Plan of Care Reviewed With: patient  Overall Patient Progress: improving  Goal: Patient-Specific Goal (Individualized)  Description: You can add care plan individualizations to a care plan. Examples of Individualization might be:  \"Parent requests to be called daily at 9am for status\", \"I have a hard time hearing out of my right ear\", or \"Do not touch me to wake me up as it startles  me\".  6/21/2024 0609 by Priscila Villar, " RN  Outcome: Progressing  6/21/2024 0609 by Priscila Villar RN  Outcome: Not Progressing  Goal: Absence of Hospital-Acquired Illness or Injury  6/21/2024 0609 by Priscila Villar RN  Outcome: Progressing  6/21/2024 0609 by Priscila Villar RN  Outcome: Not Progressing  Intervention: Identify and Manage Fall Risk  Recent Flowsheet Documentation  Taken 6/20/2024 2023 by Priscila Villar RN  Safety Promotion/Fall Prevention:   safety round/check completed   room near nurse's station   room door open   nonskid shoes/slippers when out of bed   clutter free environment maintained  Intervention: Prevent Skin Injury  Recent Flowsheet Documentation  Taken 6/20/2024 2023 by Priscila Villar RN  Body Position: position changed independently  Intervention: Prevent Infection  Recent Flowsheet Documentation  Taken 6/20/2024 2023 by Priscila Villar RN  Infection Prevention:   hand hygiene promoted   single patient room provided  Goal: Optimal Comfort and Wellbeing  6/21/2024 0609 by Priscila Villar RN  Outcome: Progressing  6/21/2024 0609 by Priscila Villar RN  Outcome: Not Progressing  Goal: Readiness for Transition of Care  6/21/2024 0609 by Priscila Villar RN  Outcome: Progressing  6/21/2024 0609 by Priscila Villar RN  Outcome: Not Progressing     Problem: Wound  Goal: Optimal Coping  6/21/2024 0609 by Priscila Villar RN  Outcome: Progressing  6/21/2024 0609 by Priscila Villar RN  Outcome: Not Progressing  Goal: Optimal Functional Ability  6/21/2024 0609 by Priscila Villar RN  Outcome: Progressing  6/21/2024 0609 by Priscila Villar RN  Outcome: Not Progressing  Intervention: Optimize Functional Ability  Recent Flowsheet Documentation  Taken 6/20/2024 2023 by Priscila Villar RN  Assistive Device Utilized: walker  Activity Management: up in chair  Activity Assistance Provided: assistance, stand-by  Goal: Absence of Infection Signs and Symptoms  6/21/2024 0609 by Priscila Villar RN  Outcome: Progressing  6/21/2024 0609  by Jian, Priscila, RN  Outcome: Not Progressing  Goal: Improved Oral Intake  6/21/2024 0609 by Priscila Villar RN  Outcome: Progressing  6/21/2024 0609 by Priscila Villar RN  Outcome: Not Progressing  Goal: Optimal Pain Control and Function  6/21/2024 0609 by Priscila Villar RN  Outcome: Progressing  6/21/2024 0609 by Priscila Villar RN  Outcome: Not Progressing  Goal: Skin Health and Integrity  6/21/2024 0609 by Priscila Villar RN  Outcome: Progressing  6/21/2024 0609 by Priscila Villar RN  Outcome: Not Progressing  Intervention: Optimize Skin Protection  Recent Flowsheet Documentation  Taken 6/20/2024 2023 by Priscila Villar RN  Activity Management: up in chair  Head of Bed (HOB) Positioning: HOB at 20-30 degrees  Goal: Optimal Wound Healing  6/21/2024 0609 by Priscila Villar RN  Outcome: Progressing  6/21/2024 0609 by Priscila Villar RN  Outcome: Not Progressing     Problem: Comorbidity Management  Goal: Blood Glucose Levels Within Targeted Range  6/21/2024 0609 by Priscila Villar RN  Outcome: Progressing  6/21/2024 0609 by Priscila Villar RN  Outcome: Not Progressing  Intervention: Monitor and Manage Glycemia  Recent Flowsheet Documentation  Taken 6/20/2024 2023 by Priscila Villar RN  Glycemic Management: blood glucose monitored  Medication Review/Management: medications reviewed     Problem: Lower Extremity Amputation  Goal: Optimal Adjustment to Amputation  6/21/2024 0609 by Priscila Villar RN  Outcome: Progressing  6/21/2024 0609 by Priscila Villar RN  Outcome: Not Progressing  Goal: Optimal Safe BADL Performance  6/21/2024 0609 by Priscila Villar RN  Outcome: Progressing  6/21/2024 0609 by Priscila Villar RN  Outcome: Not Progressing  Goal: Optimal Safe IADL Performance  6/21/2024 0609 by Priscila Villar RN  Outcome: Progressing  6/21/2024 0609 by Priscila Villar RN  Outcome: Not Progressing  Goal: Effective Lower Limb Care  6/21/2024 0609 by Priscila Villar, RN  Outcome:  Progressing  6/21/2024 0609 by Priscila Villar, RN  Outcome: Not Progressing  Goal: Optimal Mobility [place] and Safety  6/21/2024 0609 by Priscila Villar RN  Outcome: Progressing  6/21/2024 0609 by Priscila Villar, RN  Outcome: Not Progressing  Goal: Acceptable Pain/Hypersensitivity Control  6/21/2024 0609 by Priscila Villar, RN  Outcome: Progressing  6/21/2024 0609 by Priscila Villar, RN  Outcome: Not Progressing  Goal: Effective Prosthesis Use and Management  6/21/2024 0609 by Priscila Villar, RN  Outcome: Progressing  6/21/2024 0609 by Priscila Villar RN  Outcome: Not Progressing

## 2024-06-21 NOTE — PROGRESS NOTES
Care Management Discharge Note    Discharge Date: 06/21/2024       Discharge Disposition: Home    Discharge Services:  none    Discharge DME:  none    Discharge Transportation: family or friend will provide    Private pay costs discussed: Not applicable    Handoff Referral Completed: Yes    Additional Information:  Patient to discharge home. No care management needs.     Kisha Smith RN  Care Coordinator  [facility]

## 2024-06-22 LAB
BACTERIA BLD CULT: NO GROWTH
BACTERIA BLD CULT: NO GROWTH
BACTERIA TISS BX CULT: ABNORMAL
BACTERIA TISS BX CULT: ABNORMAL

## 2024-06-25 LAB — BACTERIA TISS BX CULT: NORMAL

## 2024-06-26 ENCOUNTER — OFFICE VISIT (OUTPATIENT)
Dept: PODIATRY | Facility: CLINIC | Age: 64
End: 2024-06-26
Payer: COMMERCIAL

## 2024-06-26 ENCOUNTER — TELEPHONE (OUTPATIENT)
Dept: PODIATRY | Facility: CLINIC | Age: 64
End: 2024-06-26

## 2024-06-26 VITALS
WEIGHT: 237 LBS | BODY MASS INDEX: 35.1 KG/M2 | SYSTOLIC BLOOD PRESSURE: 126 MMHG | DIASTOLIC BLOOD PRESSURE: 72 MMHG | HEIGHT: 69 IN

## 2024-06-26 DIAGNOSIS — E11.621 DIABETIC ULCER OF OTHER PART OF LEFT FOOT ASSOCIATED WITH TYPE 2 DIABETES MELLITUS, WITH FAT LAYER EXPOSED (H): ICD-10-CM

## 2024-06-26 DIAGNOSIS — Z89.432 S/P TRANSMETATARSAL AMPUTATION OF FOOT, LEFT (H): Primary | ICD-10-CM

## 2024-06-26 DIAGNOSIS — L97.522 DIABETIC ULCER OF OTHER PART OF LEFT FOOT ASSOCIATED WITH TYPE 2 DIABETES MELLITUS, WITH FAT LAYER EXPOSED (H): ICD-10-CM

## 2024-06-26 PROCEDURE — 99024 POSTOP FOLLOW-UP VISIT: CPT | Performed by: PODIATRIST

## 2024-06-26 NOTE — LETTER
6/26/2024      Crispin Rojas  [address] [facility] [address]      Dear Colleague,    Thank you for referring your patient, Crispin Rojas, to the [facility]. Please see a copy of my visit note below.    [facility]  CLINIC NOTE    CHIEF COMPLAINT:  left foot    PATIENT HISTORY:  Crispin Rojas is a 64 year old male  who presents for follow up for left foot. Was seen in the hospital for a left foot infection to submet 3, requiring a TMA. He's been discharge home and feels well overall. Notes some pain to the lateral fifth met site, which was also present while in the hospital and is now improving. Presents walking in CAM boot, stating the walker slows him down too much. No significant drainage or other issues. Does note some blood in his urine and is unsure what this was, has resolved.         Review of Systems:  A 10 point review of systems was performed and is positive for that noted above in the patient history.  All other areas are negative.     PAST MEDICAL HISTORY:   Past Medical History:   Diagnosis Date     Antiplatelet or antithrombotic long-term use      Ascending aorta dilatation (H24)      Cerebral artery occlusion with cerebral infarction (H)      Cerebral infarction (H)     2010     Gastroesophageal reflux disease with esophagitis      H/O blood clots 2022     Hyperlipidemia LDL goal <70      Hypertension      Nonalcoholic steatohepatitis 11/30/2022     Numbness and tingling      Obesity      GILA (obstructive sleep apnea) 10/22/2018    CPAP intolerant     PVD (peripheral vascular disease) (H24)     s/p left partial toe amputation     Renal stone      Tremor      Type 2 diabetes mellitus with diabetic polyneuropathy, with long-term current use of insulin (H)         PAST SURGICAL HISTORY:   Past Surgical History:   Procedure Laterality Date     AMPUTATE FOOT Left 8/18/2016    Procedure: AMPUTATE FOOT;  Surgeon: Jack Younger  SHRUTHI;  Location: [facility]     AMPUTATE TOE(S) Right 2/1/2016    Procedure: AMPUTATE TOE(S);  Surgeon: Rachelle Manriquez DPM, Pod;  Location: [facility]     AMPUTATE TOE(S) Right 8/2/2019    Procedure: Right fourth toe partial amputation for treatment of osteomyelitis.;  Surgeon: Jack Younger DPM;  Location: [facility]     AMPUTATE TOE(S) Left 11/8/2019    Procedure: Left second toe amputation at the metatarsophalangeal joint.;  Surgeon: Rachelle Manriquez DPM, Podiatry/Foot and Ankle Surgery;  Location: [facility]     AMPUTATE TOE(S) Right 6/5/2022    Procedure: AMPUTATION OF RIGHT GREAT TOE;  Surgeon: Wili Quiles DPM;  Location: [facility]     AMPUTATE TOE(S) Right 7/28/2022    Procedure: Right foot partial first metatarsal resection;  Surgeon: Tiny Soto DPM;  Location: [facility]     AMPUTATE TOE(S) Left 6/18/2024    Procedure: Left foot transmetatarsal amputation;  Surgeon: Tiny Soto DPM;  Location: [facility]     ANGIOGRAM       BIOPSY BONE FOOT Right 7/24/2022    Procedure: Bone biopsy, right first metatarsal.;  Surgeon: Valentino Molina DPM;  Location: [facility]     COLONOSCOPY       COMBINED CYSTOSCOPY, RETROGRADES, URETEROSCOPY, INSERT STENT Left 8/21/2016    Procedure: COMBINED CYSTOSCOPY, RETROGRADES, URETEROSCOPY, INSERT STENT;  Surgeon: Artemio Valenzuela MD;  Location: [facility]     COMBINED CYSTOSCOPY, RETROGRADES, URETEROSCOPY, LASER HOLMIUM LITHOTRIPSY URETER(S), INSERT STENT Left 10/3/2016    Procedure: COMBINED CYSTOSCOPY, RETROGRADES, URETEROSCOPY, LASER HOLMIUM LITHOTRIPSY URETER(S), INSERT STENT;  Surgeon: Artemio Valenzuela MD;  [facility]: [facility]     EXTRACORPOREAL SHOCK WAVE LITHOTRIPSY (ESWL) Left 9/8/2016    Procedure: EXTRACORPOREAL SHOCK WAVE LITHOTRIPSY (ESWL);  Surgeon: Artemio Valenzuela MD;  Location: [facility]     INCISION AND DRAINAGE FOOT, COMBINED Right 7/24/2022    Procedure: Incision and drainage, right foot ;  Surgeon: Valentino Molina DPM;  Location: [facility]     IRRIGATION  AND DEBRIDEMENT FOOT, COMBINED Left 10/19/2023    Procedure: Left foot second metatarsal resection , . Left plantar ulcer excisional debridement, down to and including tendon, with closure, site measuring 4 cm x 2 cm x 1 cm;  Surgeon: Tiny Soto DPM;  Location: [facility]     OSTEOTOMY FOOT Right 11/30/2022    Procedure: 1. Right second metatarsal floating osteotomy  2. Right second digit proximal phalanx arthroplasty 3. Right percutaneous flexor tenotomy;  Surgeon: Tiny Soto DPM;  Location: [facility]     OSTEOTOMY FOOT Right 1/19/2023    Procedure: Right foot third metatarsal head excision, ulcer debridement, matatarsal osteotomies;  Surgeon: Tiny Soto DPM;  Location: [facility]     RECESSION GASTROCNEMIUS Right 2/1/2016    Procedure: RECESSION GASTROCNEMIUS;  Surgeon: Rachelle Manriquez DPM, Pod;  Location: [facility]        MEDICATIONS:  Reviewed in Epic.     ALLERGIES:    Allergies   Allergen Reactions     Statins Swelling     Other reaction(s): Other (see comments)  SIMCOR caused edema in extremities       Bactrim [Sulfamethoxazole-Trimethoprim] Nausea and Vomiting     Sulfatolamide Nausea and Vomiting     Niacin Swelling     SIMCOR caused edema in extremities     Simvastatin Swelling     SIMCOR caused edema in extremities     Sulfa Antibiotics Nausea        SOCIAL HISTORY:   Social History     Socioeconomic History     Marital status:      Spouse name: Sydney     Number of children: 2     Years of education: Not on file     Highest education level: Master's degree (e.g., MA, MS, [place], MEd, MSW, ELIANA)   Occupational History     Occupation: marketing     Employer: [employer]     Comment: [employer]   Tobacco Use     Smoking status: Never     Smokeless tobacco: Never   Vaping Use     Vaping status: Never Used   Substance and Sexual Activity     Alcohol use: Yes     Comment: rare---red wine 3x per month     Drug use: Never     Sexual activity: Not Currently     Partners: Female   Other Topics Concern      Parent/sibling w/ CABG, MI or angioplasty before 65F 55M? No   Social History Narrative     Not on file     Social Determinants of Health     Financial Resource Strain: Low Risk  (1/31/2024)    Financial Resource Strain      Within the past 12 months, have you or your family members you live with been unable to get utilities (heat, electricity) when it was really needed?: No   Food Insecurity: Low Risk  (1/31/2024)    Food Insecurity      Within the past 12 months, did you worry that your food would run out before you got money to buy more?: No      Within the past 12 months, did the food you bought just not last and you didn t have money to get more?: No   Transportation Needs: Low Risk  (1/31/2024)    Transportation Needs      Within the past 12 months, has lack of transportation kept you from medical appointments, getting your medicines, non-medical meetings or appointments, work, or from getting things that you need?: No   Physical Activity: Unknown (1/31/2024)    Exercise Vital Sign      Days of Exercise per Week: 4 days      Minutes of Exercise per Session: Not on file   Stress: No Stress Concern Present (1/31/2024)    Citizen of the [place] of Occupational Health - Occupational Stress Questionnaire      Feeling of Stress : Not at all   Social Connections: Moderately Integrated (1/31/2024)    Social Connection and Isolation Panel [NHANES]      Frequency of Communication with Friends and Family: Twice a week      Frequency of Social Gatherings with Friends and Family: Never      Attends Synagogue Services: More than 4 times per year      Active Member of Clubs or Organizations: Yes      Attends Club or Organization Meetings: More than 4 times per year      Marital Status:    Interpersonal Safety: Low Risk  (1/31/2024)    Interpersonal Safety      Do you feel physically and emotionally safe where you currently live?: Yes      Within the past 12 months, have you been hit, slapped, kicked or otherwise  "physically hurt by someone?: No      Within the past 12 months, have you been humiliated or emotionally abused in other ways by your partner or ex-partner?: No   Housing Stability: Low Risk  (1/31/2024)    Housing Stability      Do you have housing? : Yes      Are you worried about losing your housing?: No        FAMILY HISTORY:   Family History   Problem Relation Age of Onset     Arthritis Mother         SLE     Connective Tissue Disorder Mother         lupus     Diabetes Mother      Cerebrovascular Disease Mother      Cancer Mother      Diabetes Father      Coronary Artery Disease Father      Chronic Obstructive Pulmonary Disease Father      Diabetes Sister      Diabetes Maternal Grandfather         EXAM:Vitals: /72   Ht 1.753 m (5' 9\")   Wt 107.5 kg (237 lb)   BMI 35.00 kg/m    BMI= Body mass index is 35 kg/m .      General appearance: Patient is alert and fully cooperative with history & exam.  No sign of distress is noted during the visit.      Respiratory: Breathing is regular & unlabored while sitting.      HEENT: Hearing is intact to spoken word.  Speech is clear.  No gross evidence of visual impairment that would impact ambulation.      Dermatologic: Left foot incision site intact, no drainage. No cellulitis. Periwound erythema. Nontender. No ischemia. Minimal tenderness near fifth metatarsal base.   No open wounds to right foot.      Vascular: Dorsalis pedis and posterior tibial pulses are intact & regular bilaterally.  CFT and skin temperature is normal to both lower extremities.       Neurologic: Lower extremity sensation is diminished, bilateral foot, to light touch.  No evidence of neurological-based weakness or contracture in the lower extremities.       Musculoskeletal: Patient is ambulatory without an assistive device or brace.  No gross foot or ankle deformity noted.     Psychiatric: Affect is pleasant & appropriate.      Culture:   Foot, Left; Tissue   1 Result Note  Culture 1+ " Staphylococcus caprae Abnormal    Identification obtained by MALDI-TOF mass spectrometry research use only database. Test characteristics determined and verified by the [facility].   1+ Staphylococcus simulans Abnormal    Susceptibilities not routinely done, refer to antibiogram to view typical susceptibility profiles      This specimen was received on a swab. Results may not be optimal. For maximum sensitivity of detection submit tissue, fluid or needle aspirate.        Resulting Agency: [facility]     Susceptibility     Staphylococcus caprae     JEFFREY     Ciprofloxacin <=0.5 ug/mL Susceptible     Clindamycin 0.25 ug/mL Susceptible     Daptomycin 0.5 ug/mL Susceptible     Doxycycline <=0.5 ug/mL Susceptible     Erythromycin <=0.25 ug/mL Susceptible     Gentamicin <=0.5 ug/mL Susceptible     Levofloxacin 0.25 ug/mL Susceptible     Oxacillin <=0.25 ug/mL Susceptible 1     Tetracycline <=1 ug/mL Susceptible     Trimethoprim/Sulfamethoxazole 1/19 ug/mL Susceptible     Vancomycin 1 ug/mL Susceptible              Pathology:   A.  Left forefoot, amputation:  -Surgical absence of the great and second toe  -Distal volar surface with deep ulcer with adjacent active, purulent osteomyelitis  -Tissues designated as margins without evidence of osteomyelitis     E.  Left foot, third proximal metatarsal, excision:  -No evidence of osteomyelitis    IMAGING:     Left foot:     IMPRESSION: Normal first through fifth transmetatarsal amputation.  Soft tissue swelling. No acute fracture.    ASSESSMENT:  S/p left foot TMA on 6/18  Hx of several digital amputations right foot   2. Diabetes Mellitus --> hba1c: 6.4     MEDICAL DECISION MAKING:   -Discussed all findings with patient. Chart and imaging reviewed.   -Incision site intact, healing appropriately.   -Discussed need for minimal activity to allow healing. Patient is supposed to be NWB with heel use for pivots/transfers. Encouraged walker use that he has at  home.   -Order provided for orthotics. Faxed to [facility]. Will need before returning to work.   -Follow up in 2 weeks     Tiny Soto DPM   [place] Department of Podiatry/Foot & Ankle Surgery                Again, thank you for allowing me to participate in the care of your patient.        Sincerely,        Tiny Soto DPM

## 2024-06-26 NOTE — PROGRESS NOTES
[facility]  CLINIC NOTE    CHIEF COMPLAINT:  left foot    PATIENT HISTORY:  Crispin Rojas is a 64 year old male  who presents for follow up for left foot. Was seen in the hospital for a left foot infection to submet 3, requiring a TMA. He's been discharge home and feels well overall. Notes some pain to the lateral fifth met site, which was also present while in the hospital and is now improving. Presents walking in CAM boot, stating the walker slows him down too much. No significant drainage or other issues. Does note some blood in his urine and is unsure what this was, has resolved.         Review of Systems:  A 10 point review of systems was performed and is positive for that noted above in the patient history.  All other areas are negative.     PAST MEDICAL HISTORY:   Past Medical History:   Diagnosis Date    Antiplatelet or antithrombotic long-term use     Ascending aorta dilatation (H24)     Cerebral artery occlusion with cerebral infarction (H)     Cerebral infarction (H)     2010    Gastroesophageal reflux disease with esophagitis     H/O blood clots 2022    Hyperlipidemia LDL goal <70     Hypertension     Nonalcoholic steatohepatitis 11/30/2022    Numbness and tingling     Obesity     GILA (obstructive sleep apnea) 10/22/2018    CPAP intolerant    PVD (peripheral vascular disease) (H24)     s/p left partial toe amputation    Renal stone     Tremor     Type 2 diabetes mellitus with diabetic polyneuropathy, with long-term current use of insulin (H)         PAST SURGICAL HISTORY:   Past Surgical History:   Procedure Laterality Date    AMPUTATE FOOT Left 8/18/2016    Procedure: AMPUTATE FOOT;  Surgeon: Jack Younger, SHRUTHI;  Location: [facility]    AMPUTATE TOE(S) Right 2/1/2016    Procedure: AMPUTATE TOE(S);  Surgeon: Rachelle Manriquez DPM, Pod;  Location: [facility] OR    AMPUTATE TOE(S) Right 8/2/2019    Procedure: Right fourth toe partial amputation for treatment of osteomyelitis.;   Surgeon: Jack Younger DPM;  Location: [facility]    AMPUTATE TOE(S) Left 11/8/2019    Procedure: Left second toe amputation at the metatarsophalangeal joint.;  Surgeon: Rachelle Manriquez DPM, Podiatry/Foot and Ankle Surgery;  Location: [facility] OR    AMPUTATE TOE(S) Right 6/5/2022    Procedure: AMPUTATION OF RIGHT GREAT TOE;  Surgeon: Wili Quiles DPM;  Location: [facility]    AMPUTATE TOE(S) Right 7/28/2022    Procedure: Right foot partial first metatarsal resection;  Surgeon: Tiny Soto DPM;  Location: [facility]    AMPUTATE TOE(S) Left 6/18/2024    Procedure: Left foot transmetatarsal amputation;  Surgeon: Tiny Soto DPM;  Location: [facility] OR    ANGIOGRAM      BIOPSY BONE FOOT Right 7/24/2022    Procedure: Bone biopsy, right first metatarsal.;  Surgeon: Valentino Molina DPM;  Location: [facility]    COLONOSCOPY      COMBINED CYSTOSCOPY, RETROGRADES, URETEROSCOPY, INSERT STENT Left 8/21/2016    Procedure: COMBINED CYSTOSCOPY, RETROGRADES, URETEROSCOPY, INSERT STENT;  Surgeon: Artemio Valenzuela MD;  Location: [facility] OR    COMBINED CYSTOSCOPY, RETROGRADES, URETEROSCOPY, LASER HOLMIUM LITHOTRIPSY URETER(S), INSERT STENT Left 10/3/2016    Procedure: COMBINED CYSTOSCOPY, RETROGRADES, URETEROSCOPY, LASER HOLMIUM LITHOTRIPSY URETER(S), INSERT STENT;  Surgeon: Artemio Valenzuela MD;  Location: [facility]    EXTRACORPOREAL SHOCK WAVE LITHOTRIPSY (ESWL) Left 9/8/2016    Procedure: EXTRACORPOREAL SHOCK WAVE LITHOTRIPSY (ESWL);  Surgeon: Artemio Valenzuela MD;  Location: [facility] OR    INCISION AND DRAINAGE FOOT, COMBINED Right 7/24/2022    Procedure: Incision and drainage, right foot ;  Surgeon: Valentino Molina DPM;  Location: [facility]    IRRIGATION AND DEBRIDEMENT FOOT, COMBINED Left 10/19/2023    Procedure: Left foot second metatarsal resection , . Left plantar ulcer excisional debridement, down to and including tendon, with closure, site measuring 4 cm x 2 cm x 1 cm;  Surgeon: Tiny Soto DPM;  Location: [facility]  OR    OSTEOTOMY FOOT Right 11/30/2022    Procedure: 1. Right second metatarsal floating osteotomy  2. Right second digit proximal phalanx arthroplasty 3. Right percutaneous flexor tenotomy;  Surgeon: Tiny Soto DPM;  Location: [facility]    OSTEOTOMY FOOT Right 1/19/2023    Procedure: Right foot third metatarsal head excision, ulcer debridement, matatarsal osteotomies;  Surgeon: Tiny Soto DPM;  Location: [facility]    RECESSION GASTROCNEMIUS Right 2/1/2016    Procedure: RECESSION GASTROCNEMIUS;  Surgeon: Rachelle Manriquez DPM, Pod;  Location: [facility]        MEDICATIONS:  Reviewed in Epic.     ALLERGIES:    Allergies   Allergen Reactions    Statins Swelling     Other reaction(s): Other (see comments)  SIMCOR caused edema in extremities      Bactrim [Sulfamethoxazole-Trimethoprim] Nausea and Vomiting    Sulfatolamide Nausea and Vomiting    Niacin Swelling     SIMCOR caused edema in extremities    Simvastatin Swelling     SIMCOR caused edema in extremities    Sulfa Antibiotics Nausea        SOCIAL HISTORY:   Social History     Socioeconomic History    Marital status:      Spouse name: Sydney    Number of children: 2    Years of education: Not on file    Highest education level: Master's degree (e.g., MA, MS, [place], MEd, MSW, ELIANA)   Occupational History    Occupation: marketing     Employer: [employer]     Comment: [employer]   Tobacco Use    Smoking status: Never    Smokeless tobacco: Never   Vaping Use    Vaping status: Never Used   Substance and Sexual Activity    Alcohol use: Yes     Comment: rare---red wine 3x per month    Drug use: Never    Sexual activity: Not Currently     Partners: Female   Other Topics Concern    Parent/sibling w/ CABG, MI or angioplasty before 65F 55M? No   Social History Narrative    Not on file     Social Determinants of Health     Financial Resource Strain: Low Risk  (1/31/2024)    Financial Resource Strain     Within the past 12 months, have you or your family members you live  with been unable to get utilities (heat, electricity) when it was really needed?: No   Food Insecurity: Low Risk  (1/31/2024)    Food Insecurity     Within the past 12 months, did you worry that your food would run out before you got money to buy more?: No     Within the past 12 months, did the food you bought just not last and you didn t have money to get more?: No   Transportation Needs: Low Risk  (1/31/2024)    Transportation Needs     Within the past 12 months, has lack of transportation kept you from medical appointments, getting your medicines, non-medical meetings or appointments, work, or from getting things that you need?: No   Physical Activity: Unknown (1/31/2024)    Exercise Vital Sign     Days of Exercise per Week: 4 days     Minutes of Exercise per Session: Not on file   Stress: No Stress Concern Present (1/31/2024)    [employer] [place] [employer] - Occupational Stress Questionnaire     Feeling of Stress : Not at all   Social Connections: Moderately Integrated (1/31/2024)    Social Connection and Isolation Panel [NHANES]     Frequency of Communication with Friends and Family: Twice a week     Frequency of Social Gatherings with Friends and Family: Never     Attends Congregational Services: More than 4 times per year     Active Member of Clubs or Organizations: Yes     Attends Club or Organization Meetings: More than 4 times per year     Marital Status:    Interpersonal Safety: Low Risk  (1/31/2024)    Interpersonal Safety     Do you feel physically and emotionally safe where you currently live?: Yes     Within the past 12 months, have you been hit, slapped, kicked or otherwise physically hurt by someone?: No     Within the past 12 months, have you been humiliated or emotionally abused in other ways by your partner or ex-partner?: No   Housing Stability: Low Risk  (1/31/2024)    Housing Stability     Do you have housing? : Yes     Are you worried about losing your housing?: No       "  FAMILY HISTORY:   Family History   Problem Relation Age of Onset    Arthritis Mother         SLE    Connective Tissue Disorder Mother         lupus    Diabetes Mother     Cerebrovascular Disease Mother     Cancer Mother     Diabetes Father     Coronary Artery Disease Father     Chronic Obstructive Pulmonary Disease Father     Diabetes Sister     Diabetes Maternal Grandfather         EXAM:Vitals: /72   Ht 1.753 m (5' 9\")   Wt 107.5 kg (237 lb)   BMI 35.00 kg/m    BMI= Body mass index is 35 kg/m .      General appearance: Patient is alert and fully cooperative with history & exam.  No sign of distress is noted during the visit.      Respiratory: Breathing is regular & unlabored while sitting.      HEENT: Hearing is intact to spoken word.  Speech is clear.  No gross evidence of visual impairment that would impact ambulation.      Dermatologic: Left foot incision site intact, no drainage. No cellulitis. Periwound erythema. Nontender. No ischemia. Minimal tenderness near fifth metatarsal base.   No open wounds to right foot.      Vascular: Dorsalis pedis and posterior tibial pulses are intact & regular bilaterally.  CFT and skin temperature is normal to both lower extremities.       Neurologic: Lower extremity sensation is diminished, bilateral foot, to light touch.  No evidence of neurological-based weakness or contracture in the lower extremities.       Musculoskeletal: Patient is ambulatory without an assistive device or brace.  No gross foot or ankle deformity noted.     Psychiatric: Affect is pleasant & appropriate.      Culture:   Foot, Left; Tissue   1 Result Note  Culture 1+ Staphylococcus caprae Abnormal    Identification obtained by MALDI-TOF mass spectrometry research use only database. Test characteristics determined and verified by the [facility].   1+ Staphylococcus simulans Abnormal    Susceptibilities not routinely done, refer to antibiogram to view typical " susceptibility profiles      This specimen was received on a swab. Results may not be optimal. For maximum sensitivity of detection submit tissue, fluid or needle aspirate.        Resulting Agency: [facility]     Susceptibility     Staphylococcus caprae     JEFFREY     Ciprofloxacin <=0.5 ug/mL Susceptible     Clindamycin 0.25 ug/mL Susceptible     Daptomycin 0.5 ug/mL Susceptible     Doxycycline <=0.5 ug/mL Susceptible     Erythromycin <=0.25 ug/mL Susceptible     Gentamicin <=0.5 ug/mL Susceptible     Levofloxacin 0.25 ug/mL Susceptible     Oxacillin <=0.25 ug/mL Susceptible 1     Tetracycline <=1 ug/mL Susceptible     Trimethoprim/Sulfamethoxazole 1/19 ug/mL Susceptible     Vancomycin 1 ug/mL Susceptible              Pathology:   A.  Left forefoot, amputation:  -Surgical absence of the great and second toe  -Distal volar surface with deep ulcer with adjacent active, purulent osteomyelitis  -Tissues designated as margins without evidence of osteomyelitis     E.  Left foot, third proximal metatarsal, excision:  -No evidence of osteomyelitis    IMAGING:     Left foot:     IMPRESSION: Normal first through fifth transmetatarsal amputation.  Soft tissue swelling. No acute fracture.    ASSESSMENT:  S/p left foot TMA on 6/18  Hx of several digital amputations right foot   2. Diabetes Mellitus --> hba1c: 6.4     MEDICAL DECISION MAKING:   -Discussed all findings with patient. Chart and imaging reviewed.   -Incision site intact, healing appropriately.   -Discussed need for minimal activity to allow healing. Patient is supposed to be NWB with heel use for pivots/transfers. Encouraged walker use that he has at home.   -Order provided for orthotics. Faxed to [facility]. Will need before returning to work.   -Follow up in 2 weeks     Tiny Soto DPM   [facility] of Pod[facility]ry/Foot & Ankle Surgery

## 2024-06-26 NOTE — TELEPHONE ENCOUNTER
Reason for Call:  Form, our goal is to have forms completed with 7 days, however, some forms may require a visit or additional information.    Type of letter, form or note:   Orthotics order         How will form be returned?:  fax to [phone]    Has the patient signed a consent form for release of information (may be included with form)? Not Applicable    Additional comments: Form faxed to [facility] on 6/26/2024

## 2024-06-27 ENCOUNTER — NURSE TRIAGE (OUTPATIENT)
Dept: FAMILY MEDICINE | Facility: CLINIC | Age: 64
End: 2024-06-27

## 2024-06-27 ENCOUNTER — APPOINTMENT (OUTPATIENT)
Dept: CT IMAGING | Facility: CLINIC | Age: 64
End: 2024-06-27
Attending: EMERGENCY MEDICINE
Payer: COMMERCIAL

## 2024-06-27 ENCOUNTER — HOSPITAL ENCOUNTER (EMERGENCY)
Facility: CLINIC | Age: 64
Discharge: HOME | End: 2024-06-27
Attending: EMERGENCY MEDICINE | Admitting: EMERGENCY MEDICINE
Payer: COMMERCIAL

## 2024-06-27 VITALS
RESPIRATION RATE: 18 BRPM | DIASTOLIC BLOOD PRESSURE: 78 MMHG | SYSTOLIC BLOOD PRESSURE: 133 MMHG | TEMPERATURE: 97.3 F | OXYGEN SATURATION: 99 % | HEART RATE: 72 BPM

## 2024-06-27 DIAGNOSIS — N20.0 KIDNEY STONE: ICD-10-CM

## 2024-06-27 LAB
ALBUMIN UR-MCNC: 10 MG/DL
ANION GAP SERPL CALCULATED.3IONS-SCNC: 17 MMOL/L (ref 7–15)
APPEARANCE UR: CLEAR
BASOPHILS # BLD AUTO: 0.1 10E3/UL (ref 0–0.2)
BASOPHILS NFR BLD AUTO: 1 %
BILIRUB UR QL STRIP: NEGATIVE
BUN SERPL-MCNC: 24.1 MG/DL (ref 8–23)
CALCIUM SERPL-MCNC: 9.2 MG/DL (ref 8.8–10.2)
CHLORIDE SERPL-SCNC: 103 MMOL/L (ref 98–107)
COLOR UR AUTO: YELLOW
CREAT SERPL-MCNC: 1.2 MG/DL (ref 0.67–1.17)
DEPRECATED HCO3 PLAS-SCNC: 20 MMOL/L (ref 22–29)
EGFRCR SERPLBLD CKD-EPI 2021: 68 ML/MIN/1.73M2
EOSINOPHIL # BLD AUTO: 0.1 10E3/UL (ref 0–0.7)
EOSINOPHIL NFR BLD AUTO: 1 %
ERYTHROCYTE [DISTWIDTH] IN BLOOD BY AUTOMATED COUNT: 13.4 % (ref 10–15)
GLUCOSE SERPL-MCNC: 143 MG/DL (ref 70–99)
GLUCOSE UR STRIP-MCNC: NEGATIVE MG/DL
HCT VFR BLD AUTO: 40.4 % (ref 40–53)
HGB BLD-MCNC: 12.8 G/DL (ref 13.3–17.7)
HGB UR QL STRIP: NEGATIVE
HOLD SPECIMEN: NORMAL
HOLD SPECIMEN: NORMAL
HYALINE CASTS: 1 /LPF
IMM GRANULOCYTES # BLD: 0.1 10E3/UL
IMM GRANULOCYTES NFR BLD: 1 %
KETONES UR STRIP-MCNC: NEGATIVE MG/DL
LEUKOCYTE ESTERASE UR QL STRIP: NEGATIVE
LYMPHOCYTES # BLD AUTO: 1.4 10E3/UL (ref 0.8–5.3)
LYMPHOCYTES NFR BLD AUTO: 13 %
MCH RBC QN AUTO: 27.7 PG (ref 26.5–33)
MCHC RBC AUTO-ENTMCNC: 31.7 G/DL (ref 31.5–36.5)
MCV RBC AUTO: 87 FL (ref 78–100)
MONOCYTES # BLD AUTO: 0.5 10E3/UL (ref 0–1.3)
MONOCYTES NFR BLD AUTO: 5 %
MUCOUS THREADS #/AREA URNS LPF: PRESENT /LPF
NEUTROPHILS # BLD AUTO: 8.5 10E3/UL (ref 1.6–8.3)
NEUTROPHILS NFR BLD AUTO: 80 %
NITRATE UR QL: NEGATIVE
NRBC # BLD AUTO: 0 10E3/UL
NRBC BLD AUTO-RTO: 0 /100
PH UR STRIP: 6 [PH] (ref 5–7)
PLATELET # BLD AUTO: 272 10E3/UL (ref 150–450)
POTASSIUM SERPL-SCNC: 4.5 MMOL/L (ref 3.4–5.3)
RBC # BLD AUTO: 4.62 10E6/UL (ref 4.4–5.9)
RBC URINE: 1 /HPF
SODIUM SERPL-SCNC: 140 MMOL/L (ref 135–145)
SP GR UR STRIP: 1.02 (ref 1–1.03)
SQUAMOUS EPITHELIAL: 1 /HPF
UROBILINOGEN UR STRIP-MCNC: NORMAL MG/DL
WBC # BLD AUTO: 10.6 10E3/UL (ref 4–11)
WBC URINE: <1 /HPF

## 2024-06-27 PROCEDURE — 36415 COLL VENOUS BLD VENIPUNCTURE: CPT | Performed by: EMERGENCY MEDICINE

## 2024-06-27 PROCEDURE — 82374 ASSAY BLOOD CARBON DIOXIDE: CPT | Performed by: EMERGENCY MEDICINE

## 2024-06-27 PROCEDURE — 96375 TX/PRO/DX INJ NEW DRUG ADDON: CPT

## 2024-06-27 PROCEDURE — 96374 THER/PROPH/DIAG INJ IV PUSH: CPT

## 2024-06-27 PROCEDURE — 250N000011 HC RX IP 250 OP 636: Performed by: EMERGENCY MEDICINE

## 2024-06-27 PROCEDURE — 74177 CT ABD & PELVIS W/CONTRAST: CPT

## 2024-06-27 PROCEDURE — 81001 URINALYSIS AUTO W/SCOPE: CPT | Performed by: EMERGENCY MEDICINE

## 2024-06-27 PROCEDURE — 85025 COMPLETE CBC W/AUTO DIFF WBC: CPT | Performed by: EMERGENCY MEDICINE

## 2024-06-27 PROCEDURE — 80048 BASIC METABOLIC PNL TOTAL CA: CPT | Performed by: EMERGENCY MEDICINE

## 2024-06-27 PROCEDURE — 85048 AUTOMATED LEUKOCYTE COUNT: CPT | Performed by: EMERGENCY MEDICINE

## 2024-06-27 PROCEDURE — 250N000009 HC RX 250: Performed by: EMERGENCY MEDICINE

## 2024-06-27 PROCEDURE — 99285 EMERGENCY DEPT VISIT HI MDM: CPT | Mod: 25

## 2024-06-27 RX ORDER — KETOROLAC TROMETHAMINE 15 MG/ML
15 INJECTION, SOLUTION INTRAMUSCULAR; INTRAVENOUS ONCE
Status: COMPLETED | OUTPATIENT
Start: 2024-06-27 | End: 2024-06-27

## 2024-06-27 RX ORDER — ONDANSETRON 4 MG/1
4 TABLET, ORALLY DISINTEGRATING ORAL EVERY 8 HOURS PRN
Qty: 10 TABLET | Refills: 0 | Status: SHIPPED | OUTPATIENT
Start: 2024-06-27 | End: 2024-06-30

## 2024-06-27 RX ORDER — IOPAMIDOL 755 MG/ML
500 INJECTION, SOLUTION INTRAVASCULAR ONCE
Status: COMPLETED | OUTPATIENT
Start: 2024-06-27 | End: 2024-06-27

## 2024-06-27 RX ORDER — HYDROCODONE BITARTRATE AND ACETAMINOPHEN 5; 325 MG/1; MG/1
1-2 TABLET ORAL EVERY 6 HOURS PRN
Qty: 6 TABLET | Refills: 0 | Status: SHIPPED | OUTPATIENT
Start: 2024-06-27 | End: 2024-06-30

## 2024-06-27 RX ORDER — TAMSULOSIN HYDROCHLORIDE 0.4 MG/1
0.4 CAPSULE ORAL DAILY
Qty: 10 CAPSULE | Refills: 0 | Status: SHIPPED | OUTPATIENT
Start: 2024-06-27 | End: 2024-07-05

## 2024-06-27 RX ORDER — ONDANSETRON 2 MG/ML
4 INJECTION INTRAMUSCULAR; INTRAVENOUS
Status: DISCONTINUED | OUTPATIENT
Start: 2024-06-27 | End: 2024-06-27 | Stop reason: HOSPADM

## 2024-06-27 RX ADMIN — ONDANSETRON 4 MG: 2 INJECTION INTRAMUSCULAR; INTRAVENOUS at 15:44

## 2024-06-27 RX ADMIN — KETOROLAC TROMETHAMINE 15 MG: 15 INJECTION, SOLUTION INTRAMUSCULAR; INTRAVENOUS at 15:44

## 2024-06-27 RX ADMIN — IOPAMIDOL 100 ML: 755 INJECTION, SOLUTION INTRAVENOUS at 16:11

## 2024-06-27 RX ADMIN — SODIUM CHLORIDE 65 ML: 9 INJECTION, SOLUTION INTRAVENOUS at 16:11

## 2024-06-27 ASSESSMENT — ACTIVITIES OF DAILY LIVING (ADL)
ADLS_ACUITY_SCORE: 36
ADLS_ACUITY_SCORE: 38
ADLS_ACUITY_SCORE: 38

## 2024-06-27 ASSESSMENT — COLUMBIA-SUICIDE SEVERITY RATING SCALE - C-SSRS
6. HAVE YOU EVER DONE ANYTHING, STARTED TO DO ANYTHING, OR PREPARED TO DO ANYTHING TO END YOUR LIFE?: NO
2. HAVE YOU ACTUALLY HAD ANY THOUGHTS OF KILLING YOURSELF IN THE PAST MONTH?: NO
1. IN THE PAST MONTH, HAVE YOU WISHED YOU WERE DEAD OR WISHED YOU COULD GO TO SLEEP AND NOT WAKE UP?: NO

## 2024-06-27 NOTE — ED PROVIDER NOTES
Emergency Department Note      History of Present Illness     Chief Complaint   Flank Pain      HPI   Crispin Rojas is a 64 year old male, on Eliquis, with a history of nephrolithiasis, type 2 diabetes, hyperlipidemia, PVD, and DVT who presents to the ED for evaluation of left flank pain. The patient states he had left-sided flank pain and hematuria 2 days ago that resolved on its own. At 0400 this morning, the flank pain returned in the same area. Endorses some chills, nausea, and vomiting. Reports a history of a kidney stone about 6 years ago that he was hospitalized for. Also notes a recent left foot infection that resulted in a hospital admission and toe amputation. He is now wearing a right foot boot but able to ambulate without difficulty. Denies fever, dysuria, or current hematuria.    Independent Historian   None    Review of External Notes   Reviewed 6/17/2024 hospitalization    Past Medical History     Medical History and Problem List   Kidney calculus  Type 2 diabetes  Chronic left shoulder pain  HLD  GILA  Lower extremity diabetic ulcer  PVD  Scoliosis  DVT  Hemorrhoids  Acute osteomyelitis  Essential tremor  GERD  Obesity  Liver cirrhosis  Cerebral infarction   Nephrolithiasis     Medications   Amlodipine  Atorvastatin  Eliquis  Gabapentin  Hydrochlorothiazide  Novolog  Tresiba  Lisinopril  Metformin  Omeprazole  Propranolol  Ozempic  Tadalafil     Surgical History   Foot amputation (L)  Toe amputation (B)  Angiogram  Cystoscopy  Laser lithotripsy  Shock wave lithotripsy  I&D, foot (L)  Foot osteotomy (R)  Gastrocnemius resection     Physical Exam     Patient Vitals for the past 24 hrs:   BP Temp Temp src Pulse Resp SpO2   06/27/24 1712 133/78 -- -- 72 18 99 %   06/27/24 1233 (!) 171/89 97.3  F (36.3  C) Temporal 81 16 98 %     Physical Exam  Constitutional: Alert, attentive  HENT:    Nose: Nose normal.    Mouth/Throat: Oropharynx is clear, mucous membranes are moist   Eyes: EOM are normal.   CV:  regular rate and rhythm; no murmurs, rubs or gallups  Chest: Effort normal and breath sounds normal.   GI:  There is mild LLQ tenderness. No distension. Normal bowel sounds  MSK: Normal range of motion.   Neurological: Alert, attentive  Skin: Skin is warm and dry.      Diagnostics     Lab Results   Labs Ordered and Resulted from Time of ED Arrival to Time of ED Departure   BASIC METABOLIC PANEL - Abnormal       Result Value    Sodium 140      Potassium 4.5      Chloride 103      Carbon Dioxide (CO2) 20 (*)     Anion Gap 17 (*)     Urea Nitrogen 24.1 (*)     Creatinine 1.20 (*)     GFR Estimate 68      Calcium 9.2      Glucose 143 (*)    ROUTINE UA WITH MICROSCOPIC - Abnormal    Color Urine Yellow      Appearance Urine Clear      Glucose Urine Negative      Bilirubin Urine Negative      Ketones Urine Negative      Specific Gravity Urine 1.024      Blood Urine Negative      pH Urine 6.0      Protein Albumin Urine 10 (*)     Urobilinogen Urine Normal      Nitrite Urine Negative      Leukocyte Esterase Urine Negative      Mucus Urine Present (*)     RBC Urine 1      WBC Urine <1      Squamous Epithelials Urine 1      Hyaline Casts Urine 1     CBC WITH PLATELETS AND DIFFERENTIAL - Abnormal    WBC Count 10.6      RBC Count 4.62      Hemoglobin 12.8 (*)     Hematocrit 40.4      MCV 87      MCH 27.7      MCHC 31.7      RDW 13.4      Platelet Count 272      % Neutrophils 80      % Lymphocytes 13      % Monocytes 5      % Eosinophils 1      % Basophils 1      % Immature Granulocytes 1      NRBCs per 100 WBC 0      Absolute Neutrophils 8.5 (*)     Absolute Lymphocytes 1.4      Absolute Monocytes 0.5      Absolute Eosinophils 0.1      Absolute Basophils 0.1      Absolute Immature Granulocytes 0.1      Absolute NRBCs 0.0         Imaging   CT Abdomen Pelvis w Contrast   Final Result   IMPRESSION:    1.  Obstructing 5 mm stone in the distal left ureter with moderate   proximal hydronephrosis and significant  perinephric/periureteric fat   stranding.   2.  Additional bilateral nonobstructing renal calculi.   3.  Cirrhosis without definite evidence of portal hypertension or   drainable ascites.      MORRO BARAHONA MD            SYSTEM ID:  [id number]            ED Course      Medications Administered   Medications   ketorolac (TORADOL) injection 15 mg (15 mg Intravenous $Given 6/27/24 1544)   CT SCAN FLUSH (65 mLs Intravenous $Given 6/27/24 1611)   iopamidol (ISOVUE-370) solution 500 mL (100 mLs Intravenous $Given 6/27/24 1611)       Procedures   Procedures     Discussion of Management   None    Social Determinants of Health adding to complexity of care   None    ED Course   ED Course as of 06/27/24 2158   Thu Jun 27, 2024   1507 I obtained history and performed a physical exam as noted above.    1611 I rechecked and updated the patient.        Medical Decision Making / Diagnosis     MIPS       None    [facility]   Crispin Rojas is a 64 year old male with history of diabetes and osteomyelitis with recent admission for the same who presents for evaluation of left lower quadrant colicky pain with recent hematuria.  Symptoms are concerning for possible kidney stone, but the patient is also anticoagulated, which could confound the picture.  UA shows no evidence of infection.  No hematuria was noted either.  CT abdomen pelvis does show moderate size distal ureteral stone.  On recheck, the patient is feeling significantly improved.  He is tolerating p.o.'s, nontoxic and afebrile.  Plan discharge home with supportive cares and urology follow-up as needed.  Return precautions for intractable pain, vomiting, fever, or any other concerns.    Disposition   The patient was discharged.     Diagnosis     ICD-10-CM    1. Kidney stone  N20.0            Discharge Medications   Discharge Medication List as of 6/27/2024  5:36 PM        START taking these medications    Details   HYDROcodone-acetaminophen (NORCO) 5-325 MG tablet Take 1-2 tablets  by mouth every 6 hours as needed for severe pain, Disp-6 tablet, R-0, E-Prescribe      ondansetron (ZOFRAN ODT) 4 MG ODT tab Take 1 tablet (4 mg) by mouth every 8 hours as needed for nausea, Disp-10 tablet, R-0, E-Prescribe      tamsulosin (FLOMAX) 0.4 MG capsule Take 1 capsule (0.4 mg) by mouth daily, Disp-10 capsule, R-0, E-Prescribe               Scribe Disclosure:  I, Gabbie Zepeda, am serving as a scribe at 3:07 PM on 6/27/2024 to document services personally performed by Nathan Miguel MD based on my observations and the provider's statements to me.        Nathan Miguel MD  06/27/24 5152

## 2024-06-27 NOTE — DISCHARGE INSTRUCTIONS
It was a pleasure taking care of you today. I hope you feel much better soon.  Your kidney stone may have passed or your pain may have improved. Please take pain and nausea medicines as prescribed. Take flomax to help pass the stone.  Please follow-up with urology if symptoms are persistent.  Return immediately for worse pain, vomiting, or any other concerns.

## 2024-06-27 NOTE — TELEPHONE ENCOUNTER
"  Situation   Patient calling about a potential kidney stone, asking if she go to ER.     Background  Has had Kidney stones in the past  Recent partial foot amputation   Denied catheter for surgery or having been straight cathed   Assessment     Severe Front lower abdominal pain 2 days ago, pain went away and now back today  Earlier in am lower left abdominal pain was 9/10 and now 6-7/10 \"it's still pretty bad\"    Lower left abdominal pain is constant and sharp    Vomited 3-4 times earlier today.   2 days ago x1     Blood in urine 2 days ago None since \" a lot of blood\"    No fever, no chills.     Denied dizziness or feeling lightheaded.     Recommendations.   Er now, patient is agreeable to plan.     Reason for Disposition   SEVERE abdominal pain (e.g., excruciating)   MILD TO MODERATE constant pain lasting > 2 hours   Patient sounds very sick or weak to the triager   Blood in urine (red, pink, or tea-colored)    Additional Information   Negative: Passed out (i.e., fainted, collapsed and was not responding)   Negative: Shock suspected (e.g., cold/pale/clammy skin, too weak to stand, low BP, rapid pulse)   Negative: Sounds like a life-threatening emergency to the triager   Negative: Followed an abdomen (stomach) injury   Negative: Chest pain   Negative: Pain is mainly in upper abdomen (if needed ask: 'is it mainly above the belly button?')   Negative: Abdomen bloating or swelling are main symptoms   Negative: Vomiting red blood or black (coffee ground) material   Negative: Blood in bowel movements  (Exception: Blood on surface of BM with constipation.)   Negative: Black or tarry bowel movements  (Exception: Chronic-unchanged black-grey BMs AND is taking iron pills or Pepto-Bismol.)   Negative: Unable to urinate (or only a few drops) and bladder feels very full   Negative: Pain in scrotum persists > 1 hour   Negative: Fever > 103 F (39.4 C)   Negative: Fever > 101 F (38.3 C) and over 60 years of age   Commented on: " Vomiting bile (green color)     Vomited 3-4 times today    Protocols used: Abdominal Pain - Male-A-[place]

## 2024-06-27 NOTE — ED TRIAGE NOTES
Blood in urine started a couple days ago. Also having left flank pain. Pt has vomited several times. Hx kidney stones. Alert and ambulatory.

## 2024-07-05 ENCOUNTER — OFFICE VISIT (OUTPATIENT)
Dept: FAMILY MEDICINE | Facility: CLINIC | Age: 64
End: 2024-07-05
Payer: COMMERCIAL

## 2024-07-05 VITALS
DIASTOLIC BLOOD PRESSURE: 74 MMHG | SYSTOLIC BLOOD PRESSURE: 117 MMHG | WEIGHT: 235.7 LBS | HEART RATE: 65 BPM | BODY MASS INDEX: 33.74 KG/M2 | TEMPERATURE: 97.7 F | OXYGEN SATURATION: 97 % | HEIGHT: 70 IN | RESPIRATION RATE: 20 BRPM

## 2024-07-05 DIAGNOSIS — Z86.718 PERSONAL HISTORY OF DVT (DEEP VEIN THROMBOSIS): ICD-10-CM

## 2024-07-05 DIAGNOSIS — E11.42 DIABETIC POLYNEUROPATHY ASSOCIATED WITH TYPE 2 DIABETES MELLITUS (H): ICD-10-CM

## 2024-07-05 DIAGNOSIS — B35.0 TINEA CAPITIS DUE TO TRICHOPHYTON: ICD-10-CM

## 2024-07-05 DIAGNOSIS — L97.509 TYPE 2 DIABETES MELLITUS WITH FOOT ULCER, WITH LONG-TERM CURRENT USE OF INSULIN (H): ICD-10-CM

## 2024-07-05 DIAGNOSIS — E13.621: ICD-10-CM

## 2024-07-05 DIAGNOSIS — N52.1 ERECTILE DYSFUNCTION DUE TO DISEASES CLASSIFIED ELSEWHERE: ICD-10-CM

## 2024-07-05 DIAGNOSIS — L97.426: ICD-10-CM

## 2024-07-05 DIAGNOSIS — Z79.4 TYPE 2 DIABETES MELLITUS WITH FOOT ULCER, WITH LONG-TERM CURRENT USE OF INSULIN (H): ICD-10-CM

## 2024-07-05 DIAGNOSIS — E11.621 TYPE 2 DIABETES MELLITUS WITH FOOT ULCER, WITH LONG-TERM CURRENT USE OF INSULIN (H): ICD-10-CM

## 2024-07-05 DIAGNOSIS — Z00.00 ROUTINE GENERAL MEDICAL EXAMINATION AT A HEALTH CARE FACILITY: Primary | ICD-10-CM

## 2024-07-05 DIAGNOSIS — E78.5 HYPERLIPIDEMIA LDL GOAL <70: ICD-10-CM

## 2024-07-05 DIAGNOSIS — I10 BENIGN ESSENTIAL HYPERTENSION: ICD-10-CM

## 2024-07-05 DIAGNOSIS — D50.9 IRON DEFICIENCY ANEMIA, UNSPECIFIED IRON DEFICIENCY ANEMIA TYPE: ICD-10-CM

## 2024-07-05 LAB
BASOPHILS # BLD AUTO: 0 10E3/UL (ref 0–0.2)
BASOPHILS NFR BLD AUTO: 1 %
CHOLEST SERPL-MCNC: 178 MG/DL
CREAT UR-MCNC: 172 MG/DL
EOSINOPHIL # BLD AUTO: 0.4 10E3/UL (ref 0–0.7)
EOSINOPHIL NFR BLD AUTO: 6 %
ERYTHROCYTE [DISTWIDTH] IN BLOOD BY AUTOMATED COUNT: 13.1 % (ref 10–15)
FASTING STATUS PATIENT QL REPORTED: ABNORMAL
HBA1C MFR BLD: 6.8 % (ref 0–5.6)
HCT VFR BLD AUTO: 37.3 % (ref 40–53)
HDLC SERPL-MCNC: 35 MG/DL
HGB BLD-MCNC: 11.5 G/DL (ref 13.3–17.7)
IMM GRANULOCYTES # BLD: 0.1 10E3/UL
IMM GRANULOCYTES NFR BLD: 1 %
LDLC SERPL CALC-MCNC: 111 MG/DL
LYMPHOCYTES # BLD AUTO: 1.7 10E3/UL (ref 0.8–5.3)
LYMPHOCYTES NFR BLD AUTO: 27 %
MCH RBC QN AUTO: 28 PG (ref 26.5–33)
MCHC RBC AUTO-ENTMCNC: 30.8 G/DL (ref 31.5–36.5)
MCV RBC AUTO: 91 FL (ref 78–100)
MONOCYTES # BLD AUTO: 0.5 10E3/UL (ref 0–1.3)
MONOCYTES NFR BLD AUTO: 8 %
NEUTROPHILS # BLD AUTO: 3.6 10E3/UL (ref 1.6–8.3)
NEUTROPHILS NFR BLD AUTO: 58 %
NONHDLC SERPL-MCNC: 143 MG/DL
PLATELET # BLD AUTO: 194 10E3/UL (ref 150–450)
RBC # BLD AUTO: 4.11 10E6/UL (ref 4.4–5.9)
TRIGL SERPL-MCNC: 160 MG/DL
WBC # BLD AUTO: 6.3 10E3/UL (ref 4–11)

## 2024-07-05 PROCEDURE — 36415 COLL VENOUS BLD VENIPUNCTURE: CPT | Performed by: FAMILY MEDICINE

## 2024-07-05 PROCEDURE — 83036 HEMOGLOBIN GLYCOSYLATED A1C: CPT | Performed by: FAMILY MEDICINE

## 2024-07-05 PROCEDURE — G0481 DRUG TEST DEF 8-14 CLASSES: HCPCS | Performed by: FAMILY MEDICINE

## 2024-07-05 PROCEDURE — 99396 PREV VISIT EST AGE 40-64: CPT | Mod: 24 | Performed by: FAMILY MEDICINE

## 2024-07-05 PROCEDURE — 99214 OFFICE O/P EST MOD 30 MIN: CPT | Mod: 25 | Performed by: FAMILY MEDICINE

## 2024-07-05 PROCEDURE — 80061 LIPID PANEL: CPT | Performed by: FAMILY MEDICINE

## 2024-07-05 PROCEDURE — 85025 COMPLETE CBC W/AUTO DIFF WBC: CPT | Performed by: FAMILY MEDICINE

## 2024-07-05 RX ORDER — KETOCONAZOLE 20 MG/ML
SHAMPOO TOPICAL DAILY PRN
Qty: 120 ML | Refills: 11 | Status: SHIPPED | OUTPATIENT
Start: 2024-07-05 | End: 2024-07-28

## 2024-07-05 RX ORDER — AMLODIPINE BESYLATE 5 MG/1
10 TABLET ORAL DAILY
Qty: 90 TABLET | Refills: 4 | Status: SHIPPED | OUTPATIENT
Start: 2024-07-05

## 2024-07-05 RX ORDER — LISINOPRIL 40 MG/1
40 TABLET ORAL DAILY
Qty: 90 TABLET | Refills: 4 | Status: SHIPPED | OUTPATIENT
Start: 2024-07-05

## 2024-07-05 RX ORDER — ATORVASTATIN CALCIUM 40 MG/1
40 TABLET, FILM COATED ORAL DAILY
Qty: 90 TABLET | Refills: 4 | Status: SHIPPED | OUTPATIENT
Start: 2024-07-05 | End: 2024-07-28

## 2024-07-05 RX ORDER — HYDROCHLOROTHIAZIDE 12.5 MG/1
12.5 CAPSULE ORAL EVERY MORNING
Qty: 90 CAPSULE | Refills: 4 | Status: SHIPPED | OUTPATIENT
Start: 2024-07-05

## 2024-07-05 RX ORDER — TADALAFIL 5 MG/1
TABLET ORAL
Qty: 30 TABLET | Refills: 1 | Status: SHIPPED | OUTPATIENT
Start: 2024-07-05

## 2024-07-05 RX ORDER — APIXABAN 5 MG/1
5 TABLET, FILM COATED ORAL 2 TIMES DAILY
Qty: 180 TABLET | Refills: 4 | Status: SHIPPED | OUTPATIENT
Start: 2024-07-05

## 2024-07-05 SDOH — HEALTH STABILITY: PHYSICAL HEALTH: ON AVERAGE, HOW MANY MINUTES DO YOU ENGAGE IN EXERCISE AT THIS LEVEL?: 80 MIN

## 2024-07-05 SDOH — HEALTH STABILITY: PHYSICAL HEALTH: ON AVERAGE, HOW MANY DAYS PER WEEK DO YOU ENGAGE IN MODERATE TO STRENUOUS EXERCISE (LIKE A BRISK WALK)?: 6 DAYS

## 2024-07-05 ASSESSMENT — SOCIAL DETERMINANTS OF HEALTH (SDOH): HOW OFTEN DO YOU GET TOGETHER WITH FRIENDS OR RELATIVES?: MORE THAN THREE TIMES A WEEK

## 2024-07-05 NOTE — PROGRESS NOTES
Preventive Care Visit  [facility], [facility]  Jul 5, 2024      Assessment & Plan   See after visit summary and result note for helpful information and advice given to patient.    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, with bone involvement without evidence of necrosis (H)    - Lipid panel reflex to direct LDL Fasting  - Lipid panel reflex to direct LDL Fasting    Hyperlipidemia LDL goal <70    - atorvastatin (LIPITOR) 40 MG tablet  Dispense: 90 tablet; Refill: 4    Personal history of DVT (deep vein thrombosis)    - ELIQUIS ANTICOAGULANT 5 MG tablet  Dispense: 180 tablet; Refill: 4    Benign essential hypertension    - amLODIPine (NORVASC) 5 MG tablet  Dispense: 90 tablet; Refill: 4  - hydrochlorothiazide (MICROZIDE) 12.5 MG capsule  Dispense: 90 capsule; Refill: 4  - lisinopril (ZESTRIL) 40 MG tablet  Dispense: 90 tablet; Refill: 4    Tinea capitis due to trichophyton  Condition is well treated with periodic use of ketoconazole shampoo per patient.  - ketoconazole (NIZORAL) 2 % external shampoo  Dispense: 120 mL; Refill: 11    Diabetic polyneuropathy associated with type 2 diabetes mellitus (H)    - [id number] - Urine Drug Confirmation Panel (Comprehensive)  - [id number] - Urine Drug Confirmation Panel (Comprehensive)    Erectile dysfunction due to diseases classified elsewhere    - tadalafil (CIALIS) 5 MG tablet  Dispense: 30 tablet; Refill: 1    Type 2 diabetes mellitus with foot ulcer, with long-term current use of insulin (H)    - Hemoglobin A1c    Iron deficiency anemia, unspecified iron deficiency anemia type    - CBC with Platelets & Differential              Counseling  Appropriate preventive services were discussed with this patient, including applicable screening as appropriate for fall prevention, nutrition, physical activity, Tobacco-use cessation, weight  loss and cognition.  Checklist reviewing preventive services available has been given to the patient.  Reviewed patient's diet, addressing concerns and/or questions.           Subjective   Pat is a 64 year old, presenting for the following:  Physical (fasting)        7/5/2024    10:47 AM   Additional Questions   Roomed by Nate   Accompanied by self        Health Care Directive  Patient does not have a Health Care Directive or Living Will: Discussed advance care planning with patient; information given to patient to review.    HPI              7/5/2024   General Health   How would you rate your overall physical health? Good   Feel stress (tense, anxious, or unable to sleep) Not at all            7/5/2024   Nutrition   Three or more servings of calcium each day? Yes   Diet: Diabetic   How many servings of fruit and vegetables per day? (!) 2-3   How many sweetened beverages each day? 0-1            7/5/2024   Exercise   Days per week of moderate/strenous exercise 6 days   Average minutes spent exercising at this level 80 min            7/5/2024   Social Factors   Frequency of gathering with friends or relatives More than three times a week   Worry food won't last until get money to buy more No   Food not last or not have enough money for food? No   Do you have housing? (Housing is defined as stable permanent housing and does not include staying ouside in a car, in a tent, in an abandoned building, in an overnight shelter, or couch-surfing.) Yes   Are you worried about losing your housing? No   Lack of transportation? No   Unable to get utilities (heat,electricity)? No            7/5/2024   Fall Risk   Fallen 2 or more times in the past year? No   Trouble with walking or balance? No             7/5/2024   Dental   Dentist two times every year? Yes            7/5/2024   TB Screening   Were you born outside of the [place]? No              Today's PHQ-2 Score:       3/25/2024     2:37 PM   PHQ-2 ( 1999 [employer])   Q1: Little  interest or pleasure in doing things 0   Q2: Feeling down, depressed or hopeless 0   PHQ-2 Score 0         7/5/2024   Substance Use   Alcohol more than 3/day or more than 7/wk No   Do you use any other substances recreationally? No        Social History     Tobacco Use    Smoking status: Never    Smokeless tobacco: Never   Vaping Use    Vaping status: Never Used   Substance Use Topics    Alcohol use: Yes     Comment: rare---red wine 3x per month    Drug use: Never           7/5/2024   STI Screening   New sexual partner(s) since last STI/HIV test? No      Last PSA:   PSA   Date Value Ref Range Status   12/05/2016 0.31 0 - 4 ug/L Final     Comment:     Assay Method:  Chemiluminescence using Siemens Vista analyzer     Prostate Specific Antigen Screen   Date Value Ref Range Status   01/31/2024 0.49 0.00 - 4.50 ng/mL Final   01/03/2022 0.44 0.00 - 4.00 ug/L Final     ASCVD Risk   The ASCVD Risk score (Rahul SEGURA, et al., 2019) failed to calculate for the following reasons:    The patient has a prior MI or stroke diagnosis           Reviewed and updated as needed this visit by Provider                          Review of Systems  Constitutional, neuro, ENT, endocrine, pulmonary, cardiac, gastrointestinal, genitourinary, musculoskeletal, integument and psychiatric systems are negative, except as otherwise noted.    Is still working at [employer].  He very much enjoys golfing.    His foot wound concerns are being managed closely by podiatry.  We deferred a diabetic foot exam being done today because he had both of his feet wrapped with bandages, which he preferred to keep in place which I was agreeable with since he was just seen by podiatry on 06/26/2024, and has an appointment again with podiatry on 07/10/2024.     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    At time of exam, patient has no acute physical or mental health concerns.    Patient feels his current  "medication management is working very well for him.  Appropriate refills done which are not refilled by his specialty providers.       Objective    Exam  /74 (BP Location: Right arm, Patient Position: Sitting, Cuff Size: Adult Large)   Pulse 65   Temp 97.7  F (36.5  C) (Oral)   Resp 20   Ht 1.778 m (5' 10\")   Wt 106.9 kg (235 lb 11.2 oz)   SpO2 97%   BMI 33.82 kg/m     Estimated body mass index is 33.82 kg/m  as calculated from the following:    Height as of this encounter: 1.778 m (5' 10\").    Weight as of this encounter: 106.9 kg (235 lb 11.2 oz).    Physical Exam  General: Vital signs reviewed.  Patient is in no acute appearing distress.  Breathing appears nonlabored.  Patient is alert and oriented ×3.      ENT: Ear exam shows bilateral tympanic membranes to be clear without injection, nasal turbinates show no injection or edema, no pharyngeal injection or exudate.    Neck: supple with no adenoapthy, palpable abnormal masses, or thyroid abnormality.    Eyes: No scleral, lid, or periorbital injection or edema noted.  No eye mattering noted.  Corneas are clear. Pupils are equal round and reactive to light with normal consensual eye movement.    Heart: Heart rate is regular without murmur.    Lungs: Lungs are clear to auscultation with good airflow bilaterally.    Abdomen:  Abdomen is soft, nontender.  No palpable abnormal masses or organomegaly.  Bowel sounds are normal.    Genital exam: Patient declined exam for possible hernia.    Back: No areas of tenderness.    Skin: Warm and dry, with no rash or abnormal lesions noted outside of feet wrapped in bandages.    Extremities: No lower leg edema noted.  No joint edema or restricted range of motion noted.    Neuro: No acute focal deficits or other abnormalities noted.    Psych: Patient is pleasant, making good eye contact, with clear and fluent speech.  Answers questions appropriately. No psychomotor agitation.     Signed Electronically by: Johann Serna" DO

## 2024-07-05 NOTE — PATIENT INSTRUCTIONS
Patient Education   Preventive Care Advice   This is general advice given by our system to help you stay healthy. However, your care team may have specific advice just for you. Please talk to your care team about your preventive care needs.  Nutrition  Eat 5 or more servings of fruits and vegetables each day.  Try wheat bread, brown rice and whole grain pasta (instead of white bread, rice, and pasta).  Get enough calcium and vitamin D. Check the label on foods and aim for 100% of the RDA (recommended daily allowance).  Lifestyle  Exercise at least 150 minutes each week  (30 minutes a day, 5 days a week).  Do muscle strengthening activities 2 days a week. These help control your weight and prevent disease.  No smoking.  Wear sunscreen to prevent skin cancer.  Have a dental exam and cleaning every 6 months.  Yearly exams  See your health care team every year to talk about:  Any changes in your health.  Any medicines your care team has prescribed.  Preventive care, family planning, and ways to prevent chronic diseases.  Shots (vaccines)   HPV shots (up to age 26), if you've never had them before.  Hepatitis B shots (up to age 59), if you've never had them before.  COVID-19 shot: Get this shot when it's due.  Flu shot: Get a flu shot every year.  Tetanus shot: Get a tetanus shot every 10 years.  Pneumococcal, hepatitis A, and RSV shots: Ask your care team if you need these based on your risk.  Shingles shot (for age 50 and up)  General health tests  Diabetes screening:  Starting at age 35, Get screened for diabetes at least every 3 years.  If you are younger than age 35, ask your care team if you should be screened for diabetes.  Cholesterol test: At age 39, start having a cholesterol test every 5 years, or more often if advised.  Bone density scan (DEXA): At age 50, ask your care team if you should have this scan for osteoporosis (brittle bones).  Hepatitis C: Get tested at least once in your life.  STIs (sexually  transmitted infections)  Before age 24: Ask your care team if you should be screened for STIs.  After age 24: Get screened for STIs if you're at risk. You are at risk for STIs (including HIV) if:  You are sexually active with more than one person.  You don't use condoms every time.  You or a partner was diagnosed with a sexually transmitted infection.  If you are at risk for HIV, ask about PrEP medicine to prevent HIV.  Get tested for HIV at least once in your life, whether you are at risk for HIV or not.  Cancer screening tests  Cervical cancer screening: If you have a cervix, begin getting regular cervical cancer screening tests starting at age 21.  Breast cancer scan (mammogram): If you've ever had breasts, begin having regular mammograms starting at age 40. This is a scan to check for breast cancer.  Colon cancer screening: It is important to start screening for colon cancer at age 45.  Have a colonoscopy test every 10 years (or more often if you're at risk) Or, ask your provider about stool tests like a FIT test every year or Cologuard test every 3 years.  To learn more about your testing options, visit:   .  For help making a decision, visit:   https://bit.ly/cg38491.  Prostate cancer screening test: If you have a prostate, ask your care team if a prostate cancer screening test (PSA) at age 55 is right for you.  Lung cancer screening: If you are a current or former smoker ages 50 to 80, ask your care team if ongoing lung cancer screenings are right for you.  For informational purposes only. Not to replace the advice of your health care provider. Copyright   2023 [employer]. All rights reserved. Clinically reviewed by the [facility]. [employer] 395718 - REV 01/24.

## 2024-07-08 ENCOUNTER — TELEPHONE (OUTPATIENT)
Dept: FAMILY MEDICINE | Facility: CLINIC | Age: 64
End: 2024-07-08
Payer: COMMERCIAL

## 2024-07-09 LAB — GABAPENTIN UR QL CFM: PRESENT

## 2024-07-09 NOTE — TELEPHONE ENCOUNTER
Retail Pharmacy Prior Authorization Team   Phone: [phone]    PRIOR AUTHORIZATION DENIED    Medication: TADALAFIL 5 MG PO TABS  Insurance Company: [employer] [place] - Phone [phone] Fax [phone]  Denial Date: 7/7/2024  Denial Reason(s): DRUGS USED FOR SEXUAL DYSFUNCTION ARE EXCLUDED FROM COVERAGE      Appeal Information: IF THE PROVIDER WOULD LIKE TO APPEAL THIS DECISION PLEASE PROVIDE THE PA TEAM WITH A LETTER OF MEDICAL NECESSITY      Patient Notified: NO

## 2024-07-10 ENCOUNTER — OFFICE VISIT (OUTPATIENT)
Dept: PODIATRY | Facility: CLINIC | Age: 64
End: 2024-07-10
Payer: COMMERCIAL

## 2024-07-10 VITALS
DIASTOLIC BLOOD PRESSURE: 76 MMHG | HEIGHT: 70 IN | WEIGHT: 235 LBS | SYSTOLIC BLOOD PRESSURE: 138 MMHG | BODY MASS INDEX: 33.64 KG/M2

## 2024-07-10 DIAGNOSIS — E11.42 DIABETIC POLYNEUROPATHY ASSOCIATED WITH TYPE 2 DIABETES MELLITUS (H): ICD-10-CM

## 2024-07-10 DIAGNOSIS — Z89.432 S/P TRANSMETATARSAL AMPUTATION OF FOOT, LEFT (H): Primary | ICD-10-CM

## 2024-07-10 PROCEDURE — 99024 POSTOP FOLLOW-UP VISIT: CPT | Performed by: PODIATRIST

## 2024-07-10 NOTE — PROGRESS NOTES
[facility]  CLINIC NOTE    CHIEF COMPLAINT:  left foot    PATIENT HISTORY:  Crispin Rojas is a 64 year old male  who presents for follow up for left foot. Was seen in the hospital for a left foot infection to submet 3, requiring a TMA. He's been discharge home and feels well overall. Notes some pain to the lateral fifth met site, which was also present while in the hospital and is now improving. Presents walking in CAM boot, stating the walker slows him down too much. No significant drainage or other issues. Does note some blood in his urine and is unsure what this was, has resolved.   7/10: Follows up for left foot. States feels well and has been walking a fair amount, up to 2.5 miles a day. Has bee using CAM boot. Still not working. Has left bandage on since last appt. No drainage from site.         Review of Systems:  A 10 point review of systems was performed and is positive for that noted above in the patient history.  All other areas are negative.     PAST MEDICAL HISTORY:   Past Medical History:   Diagnosis Date    Antiplatelet or antithrombotic long-term use     Ascending aorta dilatation (H24)     Cerebral artery occlusion with cerebral infarction (H)     Cerebral infarction (H)     2010    Gastroesophageal reflux disease with esophagitis     H/O blood clots 2022    Hyperlipidemia LDL goal <70     Hypertension     Nonalcoholic steatohepatitis 11/30/2022    Numbness and tingling     Obesity     GILA (obstructive sleep apnea) 10/22/2018    CPAP intolerant    PVD (peripheral vascular disease) (H24)     s/p left partial toe amputation    Renal stone     Tremor     Type 2 diabetes mellitus with diabetic polyneuropathy, with long-term current use of insulin (H)         PAST SURGICAL HISTORY:   Past Surgical History:   Procedure Laterality Date    AMPUTATE FOOT Left 8/18/2016    Procedure: AMPUTATE FOOT;  Surgeon: Jack Younger DPM;  Location: [facility]    AMPUTATE TOE(S) Right  2/1/2016    Procedure: AMPUTATE TOE(S);  Surgeon: Rachelle Manriquez DPM, Pod;  Location: [facility]    AMPUTATE TOE(S) Right 8/2/2019    Procedure: Right fourth toe partial amputation for treatment of osteomyelitis.;  Surgeon: Jack Younger DPM;  Location: [facility]    AMPUTATE TOE(S) Left 11/8/2019    Procedure: Left second toe amputation at the metatarsophalangeal joint.;  Surgeon: Rachelle Manriquez DPM, Podiatry/Foot and Ankle Surgery;  Location: [facility]    AMPUTATE TOE(S) Right 6/5/2022    Procedure: AMPUTATION OF RIGHT GREAT TOE;  Surgeon: Wili Quiles DPM;  Location: [facility]    AMPUTATE TOE(S) Right 7/28/2022    Procedure: Right foot partial first metatarsal resection;  Surgeon: Tiny Soto DPM;  Location: [facility]    AMPUTATE TOE(S) Left 6/18/2024    Procedure: Left foot transmetatarsal amputation;  Surgeon: Tiny Soto DPM;  Location: [facility]    ANGIOGRAM      BIOPSY BONE FOOT Right 7/24/2022    Procedure: Bone biopsy, right first metatarsal.;  Surgeon: Valentino Molina DPM;  Location: [facility]    COLONOSCOPY      COMBINED CYSTOSCOPY, RETROGRADES, URETEROSCOPY, INSERT STENT Left 8/21/2016    Procedure: COMBINED CYSTOSCOPY, RETROGRADES, URETEROSCOPY, INSERT STENT;  Surgeon: Artemio Valenzuela MD;  Location: [facility]    COMBINED CYSTOSCOPY, RETROGRADES, URETEROSCOPY, LASER HOLMIUM LITHOTRIPSY URETER(S), INSERT STENT Left 10/3/2016    Procedure: COMBINED CYSTOSCOPY, RETROGRADES, URETEROSCOPY, LASER HOLMIUM LITHOTRIPSY URETER(S), INSERT STENT;  Surgeon: Artemio Valenzuela MD;  Location: [facility]    EXTRACORPOREAL SHOCK WAVE LITHOTRIPSY (ESWL) Left 9/8/2016    Procedure: EXTRACORPOREAL SHOCK WAVE LITHOTRIPSY (ESWL);  Surgeon: Artemio Valenzuela MD;  Location: [facility]    INCISION AND DRAINAGE FOOT, COMBINED Right 7/24/2022    Procedure: Incision and drainage, right foot ;  Surgeon: Valentino Molina DPM;  Location: [facility]    IRRIGATION AND DEBRIDEMENT FOOT, COMBINED Left 10/19/2023    Procedure:  Left foot second metatarsal resection , . Left plantar ulcer excisional debridement, down to and including tendon, with closure, site measuring 4 cm x 2 cm x 1 cm;  Surgeon: Tiny Soto DPM;  Location: [facility]    OSTEOTOMY FOOT Right 11/30/2022    Procedure: 1. Right second metatarsal floating osteotomy  2. Right second digit proximal phalanx arthroplasty 3. Right percutaneous flexor tenotomy;  Surgeon: Tiny Soto DPM;  Location: [facility]    OSTEOTOMY FOOT Right 1/19/2023    Procedure: Right foot third metatarsal head excision, ulcer debridement, matatarsal osteotomies;  Surgeon: Tiny Soto DPM;  Location: [facility]    RECESSION GASTROCNEMIUS Right 2/1/2016    Procedure: RECESSION GASTROCNEMIUS;  Surgeon: Rachelle Manriquez DPM, Pod;  Location: [facility]        MEDICATIONS:  Reviewed in Epic.     ALLERGIES:    Allergies   Allergen Reactions    Statins Swelling     Other reaction(s): Other (see comments)  SIMCOR caused edema in extremities      Bactrim [Sulfamethoxazole-Trimethoprim] Nausea and Vomiting    Sulfatolamide Nausea and Vomiting    Niacin Swelling     SIMCOR caused edema in extremities    Simvastatin Swelling     SIMCOR caused edema in extremities    Sulfa Antibiotics Nausea        SOCIAL HISTORY:   Social History     Socioeconomic History    Marital status:      Spouse name: Sydney    Number of children: 2    Years of education: Not on file    Highest education level: Master's degree (e.g., MA, MS, [place], MEd, MSW, ELIANA)   Occupational History    Occupation: marketing     Employer: [employer]     Comment: [employer]   Tobacco Use    Smoking status: Never    Smokeless tobacco: Never   Vaping Use    Vaping status: Never Used   Substance and Sexual Activity    Alcohol use: Yes     Comment: rare---red wine 3x per month    Drug use: Never    Sexual activity: Not Currently     Partners: Female   Other Topics Concern    Parent/sibling w/ CABG, MI or angioplasty before 65F 55M? No   Social History  Narrative    Not on file     Social Determinants of Health     Financial Resource Strain: Low Risk  (7/5/2024)    Financial Resource Strain     Within the past 12 months, have you or your family members you live with been unable to get utilities (heat, electricity) when it was really needed?: No   Food Insecurity: Low Risk  (7/5/2024)    Food Insecurity     Within the past 12 months, did you worry that your food would run out before you got money to buy more?: No     Within the past 12 months, did the food you bought just not last and you didn t have money to get more?: No   Transportation Needs: Low Risk  (7/5/2024)    Transportation Needs     Within the past 12 months, has lack of transportation kept you from medical appointments, getting your medicines, non-medical meetings or appointments, work, or from getting things that you need?: No   Physical Activity: Sufficiently Active (7/5/2024)    Exercise Vital Sign     Days of Exercise per Week: 6 days     Minutes of Exercise per Session: 80 min   Stress: No Stress Concern Present (7/5/2024)    [employer] [place] [employer] - Occupational Stress Questionnaire     Feeling of Stress : Not at all   Social Connections: Socially Integrated (7/5/2024)    Social Connection and Isolation Panel [NHANES]     Frequency of Communication with Friends and Family: Twice a week     Frequency of Social Gatherings with Friends and Family: More than three times a week     Attends Sikhism Services: More than 4 times per year     Active Member of Clubs or Organizations: Yes     Attends Club or Organization Meetings: More than 4 times per year     Marital Status:    Interpersonal Safety: Low Risk  (7/5/2024)    Interpersonal Safety     Do you feel physically and emotionally safe where you currently live?: Yes     Within the past 12 months, have you been hit, slapped, kicked or otherwise physically hurt by someone?: No     Within the past 12 months, have you been  "humiliated or emotionally abused in other ways by your partner or ex-partner?: No   Housing Stability: Low Risk  (7/5/2024)    Housing Stability     Do you have housing? : Yes     Are you worried about losing your housing?: No        FAMILY HISTORY:   Family History   Problem Relation Age of Onset    Arthritis Mother         SLE    Connective Tissue Disorder Mother         lupus    Diabetes Mother     Cerebrovascular Disease Mother     Cancer Mother     Diabetes Father     Coronary Artery Disease Father     Chronic Obstructive Pulmonary Disease Father     Diabetes Sister     Diabetes Maternal Grandfather         EXAM:Vitals: /76   Ht 1.778 m (5' 10\")   Wt 106.6 kg (235 lb)   BMI 33.72 kg/m    BMI= Body mass index is 33.72 kg/m .      General appearance: Patient is alert and fully cooperative with history & exam.  No sign of distress is noted during the visit.      Respiratory: Breathing is regular & unlabored while sitting.      HEENT: Hearing is intact to spoken word.  Speech is clear.  No gross evidence of visual impairment that would impact ambulation.      Dermatologic: Left foot incision site intact, no drainage. No cellulitis. Periwound erythema. Nontender. No ischemia. Minimal tenderness near fifth metatarsal base. --> remains intact. Well epithelialized.   No open wounds to right foot.      Vascular: Dorsalis pedis and posterior tibial pulses are intact & regular bilaterally.  CFT and skin temperature is normal to both lower extremities.       Neurologic: Lower extremity sensation is diminished, bilateral foot, to light touch.  No evidence of neurological-based weakness or contracture in the lower extremities.       Musculoskeletal: Patient is ambulatory without an assistive device or brace.  No gross foot or ankle deformity noted.     Psychiatric: Affect is pleasant & appropriate.      Culture:   Foot, Left; Tissue   1 Result Note  Culture 1+ Staphylococcus caprae Abnormal    Identification " obtained by MALDI-TOF mass spectrometry research use only database. Test characteristics determined and verified by the [facility].   1+ Staphylococcus simulans Abnormal    Susceptibilities not routinely done, refer to antibiogram to view typical susceptibility profiles      This specimen was received on a swab. Results may not be optimal. For maximum sensitivity of detection submit tissue, fluid or needle aspirate.        Resulting Agency: [facility]     Susceptibility     Staphylococcus caprae     JEFFREY     Ciprofloxacin <=0.5 ug/mL Susceptible     Clindamycin 0.25 ug/mL Susceptible     Daptomycin 0.5 ug/mL Susceptible     Doxycycline <=0.5 ug/mL Susceptible     Erythromycin <=0.25 ug/mL Susceptible     Gentamicin <=0.5 ug/mL Susceptible     Levofloxacin 0.25 ug/mL Susceptible     Oxacillin <=0.25 ug/mL Susceptible 1     Tetracycline <=1 ug/mL Susceptible     Trimethoprim/Sulfamethoxazole 1/19 ug/mL Susceptible     Vancomycin 1 ug/mL Susceptible              Pathology:   A.  Left forefoot, amputation:  -Surgical absence of the great and second toe  -Distal volar surface with deep ulcer with adjacent active, purulent osteomyelitis  -Tissues designated as margins without evidence of osteomyelitis     E.  Left foot, third proximal metatarsal, excision:  -No evidence of osteomyelitis    IMAGING:     Left foot:     IMPRESSION: Normal first through fifth transmetatarsal amputation.  Soft tissue swelling. No acute fracture.    ASSESSMENT:  S/p left foot TMA on 6/18  Hx of several digital amputations right foot   2. Diabetes Mellitus --> hba1c: 6.4     MEDICAL DECISION MAKING:   -Discussed all findings with patient. Chart and imaging reviewed.   -Incision site intact, healing appropriately. Sutures removed.   -Has appt for  [facility] for custom orthotics today. Encouraged this.   -Discussed continued plan. Can transition from CAM boot to surgical shoe, but should continue to limit activity. Currently  states he's already walking up to 2.5 miles a day. Discussed that this was more than expected and he should be closely monitoring foot to make sure no incision healing complications.   -Okay to begin getting foot wet. Dry thoroughly and lightly applied bandage.   -Follow up in 3 weeks.     Tiny Soto DPM   [facility]

## 2024-07-10 NOTE — LETTER
7/10/2024      Crispin Rojas  [address] [facility] [address]      Dear Colleague,    Thank you for referring your patient, Crispin Rojas, to the [facility]. Please see a copy of my visit note below.    [facility]OT & [facility]KLE SURG[facility]  CLINIC NOTE    CHIEF COMPLAINT:  left foot    PATIENT HISTORY:  Crispin Rojas is a 64 year old male  who presents for follow up for left foot. Was seen in the hospital for a left foot infection to submet 3, requiring a TMA. He's been discharge home and feels well overall. Notes some pain to the lateral fifth met site, which was also present while in the hospital and is now improving. Presents walking in CAM boot, stating the walker slows him down too much. No significant drainage or other issues. Does note some blood in his urine and is unsure what this was, has resolved.   7/10: Follows up for left foot. States feels well and has been walking a fair amount, up to 2.5 miles a day. Has bee using CAM boot. Still not working. Has left bandage on since last appt. No drainage from site.         Review of Systems:  A 10 point review of systems was performed and is positive for that noted above in the patient history.  All other areas are negative.     PAST MEDICAL HISTORY:   Past Medical History:   Diagnosis Date     Antiplatelet or antithrombotic long-term use      Ascending aorta dilatation (H24)      Cerebral artery occlusion with cerebral infarction (H)      Cerebral infarction (H)     2010     Gastroesophageal reflux disease with esophagitis      H/O blood clots 2022     Hyperlipidemia LDL goal <70      Hypertension      Nonalcoholic steatohepatitis 11/30/2022     Numbness and tingling      Obesity      GILA (obstructive sleep apnea) 10/22/2018    CPAP intolerant     PVD (peripheral vascular disease) (H24)     s/p left partial toe amputation     Renal stone      Tremor      Type 2 diabetes mellitus with diabetic polyneuropathy, with  long-term current use of insulin (H)         PAST SURGICAL HISTORY:   Past Surgical History:   Procedure Laterality Date     AMPUTATE FOOT Left 8/18/2016    Procedure: AMPUTATE FOOT;  Surgeon: Jack Younger DPM;  Location: [facility]     AMPUTATE TOE(S) Right 2/1/2016    Procedure: AMPUTATE TOE(S);  Surgeon: Rachelle Manriquez DPM, Pod;  Location: [facility]     AMPUTATE TOE(S) Right 8/2/2019    Procedure: Right fourth toe partial amputation for treatment of osteomyelitis.;  Surgeon: Jack Younger DPM;  Location: [facility]     AMPUTATE TOE(S) Left 11/8/2019    Procedure: Left second toe amputation at the metatarsophalangeal joint.;  Surgeon: Rachelle Manriquez DPM, Podiatry/Foot and Ankle Surgery;  Location: [facility]     AMPUTATE TOE(S) Right 6/5/2022    Procedure: AMPUTATION OF RIGHT GREAT TOE;  Surgeon: Wili Quiles DPM;  Location: [facility]     AMPUTATE TOE(S) Right 7/28/2022    Procedure: Right foot partial first metatarsal resection;  Surgeon: Tiny Soto DPM;  Location: [facility]     AMPUTATE TOE(S) Left 6/18/2024    Procedure: Left foot transmetatarsal amputation;  Surgeon: Tiny Soto DPM;  Location: [facility]     ANGIOGRAM       BIOPSY BONE FOOT Right 7/24/2022    Procedure: Bone biopsy, right first metatarsal.;  Surgeon: Valentino Molina DPM;  Location: [facility]     COLONOSCOPY       COMBINED CYSTOSCOPY, RETROGRADES, URETEROSCOPY, INSERT STENT Left 8/21/2016    Procedure: COMBINED CYSTOSCOPY, RETROGRADES, URETEROSCOPY, INSERT STENT;  Surgeon: Artemio Valenzuela MD;  Location: [facility]     COMBINED CYSTOSCOPY, RETROGRADES, URETEROSCOPY, LASER HOLMIUM LITHOTRIPSY URETER(S), INSERT STENT Left 10/3/2016    Procedure: COMBINED CYSTOSCOPY, RETROGRADES, URETEROSCOPY, LASER HOLMIUM LITHOTRIPSY URETER(S), INSERT STENT;  Surgeon: Artemio Valenzuela MD;  [facility]: [facility]     EXTRACORPOREAL SHOCK WAVE LITHOTRIPSY (ESWL) Left 9/8/2016    Procedure: EXTRACORPOREAL SHOCK WAVE LITHOTRIPSY (ESWL);  Surgeon:  Artemio Valenzuela MD;  Location: [facility]     INCISION AND DRAINAGE FOOT, COMBINED Right 7/24/2022    Procedure: Incision and drainage, right foot ;  Surgeon: Valentino Molina DPM;  Location: [facility]     IRRIGATION AND DEBRIDEMENT FOOT, COMBINED Left 10/19/2023    Procedure: Left foot second metatarsal resection , . Left plantar ulcer excisional debridement, down to and including tendon, with closure, site measuring 4 cm x 2 cm x 1 cm;  Surgeon: Tiny Soto DPM;  Location: [facility]     OSTEOTOMY FOOT Right 11/30/2022    Procedure: 1. Right second metatarsal floating osteotomy  2. Right second digit proximal phalanx arthroplasty 3. Right percutaneous flexor tenotomy;  Surgeon: Tiny Soto DPM;  [facility]: [facility]     OSTEOTOMY FOOT Right 1/19/2023    Procedure: Right foot third metatarsal head excision, ulcer debridement, matatarsal osteotomies;  Surgeon: Tiny Soto DPM;  Location: [facility]     RECESSION GASTROCNEMIUS Right 2/1/2016    Procedure: RECESSION GASTROCNEMIUS;  Surgeon: Rachelle Manriquez DPM, Pod;  Location: [facility]        MEDICATIONS:  Reviewed in Epic.     ALLERGIES:    Allergies   Allergen Reactions     Statins Swelling     Other reaction(s): Other (see comments)  SIMCOR caused edema in extremities       Bactrim [Sulfamethoxazole-Trimethoprim] Nausea and Vomiting     Sulfatolamide Nausea and Vomiting     Niacin Swelling     SIMCOR caused edema in extremities     Simvastatin Swelling     SIMCOR caused edema in extremities     Sulfa Antibiotics Nausea        SOCIAL HISTORY:   Social History     Socioeconomic History     Marital status:      Spouse name: Sydney     Number of children: 2     Years of education: Not on file     Highest education level: Master's degree (e.g., MA, MS, [place], MEd, MSW, ELIANA)   Occupational History     Occupation: marketing     Employer: [employer]     Comment: [employer]   Tobacco Use     Smoking status: Never     Smokeless tobacco: Never   Vaping Use      Vaping status: Never Used   Substance and Sexual Activity     Alcohol use: Yes     Comment: rare---red wine 3x per month     Drug use: Never     Sexual activity: Not Currently     Partners: Female   Other Topics Concern     Parent/sibling w/ CABG, MI or angioplasty before 65F 55M? No   Social History Narrative     Not on file     Social Determinants of Health     Financial Resource Strain: Low Risk  (7/5/2024)    Financial Resource Strain      Within the past 12 months, have you or your family members you live with been unable to get utilities (heat, electricity) when it was really needed?: No   Food Insecurity: Low Risk  (7/5/2024)    Food Insecurity      Within the past 12 months, did you worry that your food would run out before you got money to buy more?: No      Within the past 12 months, did the food you bought just not last and you didn t have money to get more?: No   Transportation Needs: Low Risk  (7/5/2024)    Transportation Needs      Within the past 12 months, has lack of transportation kept you from medical appointments, getting your medicines, non-medical meetings or appointments, work, or from getting things that you need?: No   Physical Activity: Sufficiently Active (7/5/2024)    Exercise Vital Sign      Days of Exercise per Week: 6 days      Minutes of Exercise per Session: 80 min   Stress: No Stress Concern Present (7/5/2024)    [place] [employer] - Occupational Stress Questionnaire      Feeling of Stress : Not at all   Social Connections: Socially Integrated (7/5/2024)    Social Connection and Isolation Panel [NHANES]      Frequency of Communication with Friends and Family: Twice a week      Frequency of Social Gatherings with Friends and Family: More than three times a week      Attends Quaker Services: More than 4 times per year      Active Member of Clubs or Organizations: Yes      Attends Club or Organization Meetings: More than 4 times per year      Marital Status:  "   Interpersonal Safety: Low Risk  (7/5/2024)    Interpersonal Safety      Do you feel physically and emotionally safe where you currently live?: Yes      Within the past 12 months, have you been hit, slapped, kicked or otherwise physically hurt by someone?: No      Within the past 12 months, have you been humiliated or emotionally abused in other ways by your partner or ex-partner?: No   Housing Stability: Low Risk  (7/5/2024)    Housing Stability      Do you have housing? : Yes      Are you worried about losing your housing?: No        FAMILY HISTORY:   Family History   Problem Relation Age of Onset     Arthritis Mother         SLE     Connective Tissue Disorder Mother         lupus     Diabetes Mother      Cerebrovascular Disease Mother      Cancer Mother      Diabetes Father      Coronary Artery Disease Father      Chronic Obstructive Pulmonary Disease Father      Diabetes Sister      Diabetes Maternal Grandfather         EXAM:Vitals: /76   Ht 1.778 m (5' 10\")   Wt 106.6 kg (235 lb)   BMI 33.72 kg/m    BMI= Body mass index is 33.72 kg/m .      General appearance: Patient is alert and fully cooperative with history & exam.  No sign of distress is noted during the visit.      Respiratory: Breathing is regular & unlabored while sitting.      HEENT: Hearing is intact to spoken word.  Speech is clear.  No gross evidence of visual impairment that would impact ambulation.      Dermatologic: Left foot incision site intact, no drainage. No cellulitis. Periwound erythema. Nontender. No ischemia. Minimal tenderness near fifth metatarsal base. --> remains intact. Well epithelialized.   No open wounds to right foot.      Vascular: Dorsalis pedis and posterior tibial pulses are intact & regular bilaterally.  CFT and skin temperature is normal to both lower extremities.       Neurologic: Lower extremity sensation is diminished, bilateral foot, to light touch.  No evidence of neurological-based weakness or " contracture in the lower extremities.       Musculoskeletal: Patient is ambulatory without an assistive device or brace.  No gross foot or ankle deformity noted.     Psychiatric: Affect is pleasant & appropriate.      Culture:   Foot, Left; Tissue   1 Result Note  Culture 1+ Staphylococcus caprae Abnormal    Identification obtained by MALDI-TOF mass spectrometry research use only database. Test characteristics determined and verified by the [facility].   1+ Staphylococcus simulans Abnormal    Susceptibilities not routinely done, refer to antibiogram to view typical susceptibility profiles      This specimen was received on a swab. Results may not be optimal. For maximum sensitivity of detection submit tissue, fluid or needle aspirate.        Resulting Agency: [facility]     Susceptibility     Staphylococcus caprae     JEFFREY     Ciprofloxacin <=0.5 ug/mL Susceptible     Clindamycin 0.25 ug/mL Susceptible     Daptomycin 0.5 ug/mL Susceptible     Doxycycline <=0.5 ug/mL Susceptible     Erythromycin <=0.25 ug/mL Susceptible     Gentamicin <=0.5 ug/mL Susceptible     Levofloxacin 0.25 ug/mL Susceptible     Oxacillin <=0.25 ug/mL Susceptible 1     Tetracycline <=1 ug/mL Susceptible     Trimethoprim/Sulfamethoxazole 1/19 ug/mL Susceptible     Vancomycin 1 ug/mL Susceptible              Pathology:   A.  Left forefoot, amputation:  -Surgical absence of the great and second toe  -Distal volar surface with deep ulcer with adjacent active, purulent osteomyelitis  -Tissues designated as margins without evidence of osteomyelitis     E.  Left foot, third proximal metatarsal, excision:  -No evidence of osteomyelitis    IMAGING:     Left foot:     IMPRESSION: Normal first through fifth transmetatarsal amputation.  Soft tissue swelling. No acute fracture.    ASSESSMENT:  S/p left foot TMA on 6/18  Hx of several digital amputations right foot   2. Diabetes Mellitus --> hba1c: 6.4     MEDICAL DECISION MAKING:    -Discussed all findings with patient. Chart and imaging reviewed.   -Incision site intact, healing appropriately. Sutures removed.   -Has appt for  [facility] for custom orthotics today. Encouraged this.   -Discussed continued plan. Can transition from CAM boot to surgical shoe, but should continue to limit activity. Currently states he's already walking up to 2.5 miles a day. Discussed that this was more than expected and he should be closely monitoring foot to make sure no incision healing complications.   -Okay to begin getting foot wet. Dry thoroughly and lightly applied bandage.   -Follow up in 3 weeks.     Tiny Soto DPM   [facility] of Podiatry/Foot & Ankle Surgery                Again, thank you for allowing me to participate in the care of your patient.        Sincerely,        Tiny Soto DPM

## 2024-07-11 ENCOUNTER — TELEPHONE (OUTPATIENT)
Dept: PODIATRY | Facility: CLINIC | Age: 64
End: 2024-07-11
Payer: COMMERCIAL

## 2024-07-11 NOTE — TELEPHONE ENCOUNTER
Reason for Call:  Form, our goal is to have forms completed with 7 days, however, some forms may require a visit or additional information.    Type of letter, form or note:   Orthotics      Who is the form from?:  [facility]    Where did the form come from: form was faxed in    Phone number of person requesting form: [phone]  Can we leave a detailed message on this number:  YES    Desired completion date of form: ASAP      How will form be returned?:  fax to [phone]    Has the patient signed a consent form for release of information (may be included with form)? Not Applicable    Additional comments:     Form was started and place in Provider Basket for provider review/ completion at DR. Manriquez.

## 2024-07-12 ENCOUNTER — MEDICAL CORRESPONDENCE (OUTPATIENT)
Dept: HEALTH INFORMATION MANAGEMENT | Facility: CLINIC | Age: 64
End: 2024-07-12
Payer: COMMERCIAL

## 2024-07-19 DIAGNOSIS — E11.42 TYPE 2 DIABETES MELLITUS WITH PERIPHERAL NEUROPATHY (H): ICD-10-CM

## 2024-07-19 RX ORDER — METFORMIN HCL 500 MG
1000 TABLET, EXTENDED RELEASE 24 HR ORAL 2 TIMES DAILY
Qty: 360 TABLET | Refills: 0 | Status: SHIPPED | OUTPATIENT
Start: 2024-07-19

## 2024-07-19 NOTE — TELEPHONE ENCOUNTER
Requested Prescriptions   Pending Prescriptions Disp Refills    metFORMIN (GLUCOPHAGE XR) 500 MG 24 hr tablet [Pharmacy Med Name: METFORMIN ER 500MG 24HR TABS] 360 tablet 0     Sig: TAKE 2 TABLETS(1000 MG) BY MOUTH TWICE DAILY       Biguanide Agents Passed - 7/19/2024  2:40 PM        Passed - Patient is age 10 or older        Passed - Patient has documented A1c within the specified period of time.     If HgbA1C is 8 or greater, it needs to be on file within the past 3 months.  If less than 8, must be on file within the past 6 months.     Recent Labs   Lab Test 07/05/24  1157   A1C 6.8*             Passed - Patient does NOT have a diagnosis of CHF.        Passed - Medication is active on med list        Passed - Medication indicated for associated diagnosis     Medication is associated with one or more of the following diagnoses:     Gestational diabetes mellitus     Hyperinsulinar obesity     Hypersecretion of ovarian androgens    Non-alcoholic fatty liver    Polycystic ovarian syndrome               Pre-diabetes (DM 2 prevention)    Type 2 diabetes mellitus     Weight gain, antipsychotic therapy-induced    Impaired fasting glucose          Passed - Has GFR on file in past 12 months and most recent value is normal        Passed - Recent (6 mo) or future (90 days) visit within the authorizing provider's specialty     The patient must have completed an in-person or virtual visit within the past 6 months or has a future visit scheduled within the next 90 days with the authorizing provider s specialty.  Urgent care and e-visits do not quality as an office visit for this protocol.

## 2024-07-28 ENCOUNTER — NURSE TRIAGE (OUTPATIENT)
Dept: NURSING | Facility: CLINIC | Age: 64
End: 2024-07-28
Payer: COMMERCIAL

## 2024-07-28 ENCOUNTER — APPOINTMENT (OUTPATIENT)
Dept: GENERAL RADIOLOGY | Facility: CLINIC | Age: 64
End: 2024-07-28
Attending: EMERGENCY MEDICINE
Payer: COMMERCIAL

## 2024-07-28 ENCOUNTER — APPOINTMENT (OUTPATIENT)
Dept: ULTRASOUND IMAGING | Facility: CLINIC | Age: 64
End: 2024-07-28
Attending: HOSPITALIST
Payer: COMMERCIAL

## 2024-07-28 ENCOUNTER — HOSPITAL ENCOUNTER (INPATIENT)
Facility: CLINIC | Age: 64
LOS: 3 days | Discharge: HOME | End: 2024-08-02
Attending: EMERGENCY MEDICINE | Admitting: INTERNAL MEDICINE
Payer: COMMERCIAL

## 2024-07-28 DIAGNOSIS — L03.113 CELLULITIS OF RIGHT UPPER EXTREMITY: ICD-10-CM
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
## 2024-07-28 DIAGNOSIS — S61.451A DOG BITE OF HAND, RIGHT, INITIAL ENCOUNTER: ICD-10-CM

## 2024-07-28 LAB
ANION GAP SERPL CALCULATED.3IONS-SCNC: 15 MMOL/L (ref 7–15)
BASOPHILS # BLD AUTO: 0 10E3/UL (ref 0–0.2)
BASOPHILS NFR BLD AUTO: 0 %
BUN SERPL-MCNC: 25.2 MG/DL (ref 8–23)
CALCIUM SERPL-MCNC: 9.4 MG/DL (ref 8.8–10.4)
CHLORIDE SERPL-SCNC: 102 MMOL/L (ref 98–107)
CREAT SERPL-MCNC: 1.17 MG/DL (ref 0.67–1.17)
CRP SERPL-MCNC: 20.66 MG/L
CRP SERPL-MCNC: 62.34 MG/L
EGFRCR SERPLBLD CKD-EPI 2021: 70 ML/MIN/1.73M2
EOSINOPHIL # BLD AUTO: 0.2 10E3/UL (ref 0–0.7)
EOSINOPHIL NFR BLD AUTO: 3 %
ERYTHROCYTE [DISTWIDTH] IN BLOOD BY AUTOMATED COUNT: 14.3 % (ref 10–15)
ERYTHROCYTE [SEDIMENTATION RATE] IN BLOOD BY WESTERGREN METHOD: 24 MM/HR (ref 0–20)
GLUCOSE BLDC GLUCOMTR-MCNC: 131 MG/DL (ref 70–99)
GLUCOSE BLDC GLUCOMTR-MCNC: 155 MG/DL (ref 70–99)
GLUCOSE BLDC GLUCOMTR-MCNC: 166 MG/DL (ref 70–99)
GLUCOSE BLDC GLUCOMTR-MCNC: 243 MG/DL (ref 70–99)
GLUCOSE SERPL-MCNC: 98 MG/DL (ref 70–99)
HCO3 SERPL-SCNC: 20 MMOL/L (ref 22–29)
HCT VFR BLD AUTO: 35.3 % (ref 40–53)
HGB BLD-MCNC: 11.4 G/DL (ref 13.3–17.7)
IMM GRANULOCYTES # BLD: 0 10E3/UL
IMM GRANULOCYTES NFR BLD: 0 %
LACTATE SERPL-SCNC: 1.7 MMOL/L (ref 0.7–2)
LYMPHOCYTES # BLD AUTO: 1.3 10E3/UL (ref 0.8–5.3)
LYMPHOCYTES NFR BLD AUTO: 14 %
MCH RBC QN AUTO: 28.6 PG (ref 26.5–33)
MCHC RBC AUTO-ENTMCNC: 32.3 G/DL (ref 31.5–36.5)
MCV RBC AUTO: 89 FL (ref 78–100)
MONOCYTES # BLD AUTO: 0.8 10E3/UL (ref 0–1.3)
MONOCYTES NFR BLD AUTO: 9 %
NEUTROPHILS # BLD AUTO: 6.6 10E3/UL (ref 1.6–8.3)
NEUTROPHILS NFR BLD AUTO: 74 %
NRBC # BLD AUTO: 0 10E3/UL
NRBC BLD AUTO-RTO: 0 /100
PLATELET # BLD AUTO: 154 10E3/UL (ref 150–450)
POTASSIUM SERPL-SCNC: 4.3 MMOL/L (ref 3.4–5.3)
RBC # BLD AUTO: 3.98 10E6/UL (ref 4.4–5.9)
SODIUM SERPL-SCNC: 137 MMOL/L (ref 135–145)
WBC # BLD AUTO: 9 10E3/UL (ref 4–11)

## 2024-07-28 PROCEDURE — 250N000013 HC RX MED GY IP 250 OP 250 PS 637: Performed by: INTERNAL MEDICINE

## 2024-07-28 PROCEDURE — 99223 1ST HOSP IP/OBS HIGH 75: CPT | Mod: AI | Performed by: INTERNAL MEDICINE

## 2024-07-28 PROCEDURE — 93971 EXTREMITY STUDY: CPT | Mod: LT

## 2024-07-28 PROCEDURE — 85652 RBC SED RATE AUTOMATED: CPT | Performed by: HOSPITALIST

## 2024-07-28 PROCEDURE — 99285 EMERGENCY DEPT VISIT HI MDM: CPT | Mod: 25

## 2024-07-28 PROCEDURE — 87040 BLOOD CULTURE FOR BACTERIA: CPT | Performed by: EMERGENCY MEDICINE

## 2024-07-28 PROCEDURE — 96365 THER/PROPH/DIAG IV INF INIT: CPT | Mod: 59

## 2024-07-28 PROCEDURE — G0378 HOSPITAL OBSERVATION PER HR: HCPCS

## 2024-07-28 PROCEDURE — 250N000011 HC RX IP 250 OP 636: Performed by: INTERNAL MEDICINE

## 2024-07-28 PROCEDURE — 80048 BASIC METABOLIC PNL TOTAL CA: CPT | Performed by: EMERGENCY MEDICINE

## 2024-07-28 PROCEDURE — 250N000011 HC RX IP 250 OP 636: Performed by: HOSPITALIST

## 2024-07-28 PROCEDURE — 85025 COMPLETE CBC W/AUTO DIFF WBC: CPT | Performed by: EMERGENCY MEDICINE

## 2024-07-28 PROCEDURE — 36415 COLL VENOUS BLD VENIPUNCTURE: CPT | Performed by: HOSPITALIST

## 2024-07-28 PROCEDURE — 250N000011 HC RX IP 250 OP 636: Performed by: EMERGENCY MEDICINE

## 2024-07-28 PROCEDURE — 86140 C-REACTIVE PROTEIN: CPT | Performed by: HOSPITALIST

## 2024-07-28 PROCEDURE — 250N000013 HC RX MED GY IP 250 OP 250 PS 637: Performed by: EMERGENCY MEDICINE

## 2024-07-28 PROCEDURE — 250N000013 HC RX MED GY IP 250 OP 250 PS 637: Performed by: HOSPITALIST

## 2024-07-28 PROCEDURE — 250N000012 HC RX MED GY IP 250 OP 636 PS 637: Performed by: INTERNAL MEDICINE

## 2024-07-28 PROCEDURE — 83605 ASSAY OF LACTIC ACID: CPT | Performed by: EMERGENCY MEDICINE

## 2024-07-28 PROCEDURE — 73130 X-RAY EXAM OF HAND: CPT | Mod: RT

## 2024-07-28 PROCEDURE — 96372 THER/PROPH/DIAG INJ SC/IM: CPT | Performed by: HOSPITALIST

## 2024-07-28 PROCEDURE — 86140 C-REACTIVE PROTEIN: CPT | Performed by: INTERNAL MEDICINE

## 2024-07-28 PROCEDURE — 82962 GLUCOSE BLOOD TEST: CPT

## 2024-07-28 PROCEDURE — 36415 COLL VENOUS BLD VENIPUNCTURE: CPT | Performed by: EMERGENCY MEDICINE

## 2024-07-28 RX ORDER — NALOXONE HYDROCHLORIDE 0.4 MG/ML
0.4 INJECTION, SOLUTION INTRAMUSCULAR; INTRAVENOUS; SUBCUTANEOUS
Status: DISCONTINUED | OUTPATIENT
Start: 2024-07-28 | End: 2024-08-02 | Stop reason: HOSPADM

## 2024-07-28 RX ORDER — NALOXONE HYDROCHLORIDE 0.4 MG/ML
0.2 INJECTION, SOLUTION INTRAMUSCULAR; INTRAVENOUS; SUBCUTANEOUS
Status: DISCONTINUED | OUTPATIENT
Start: 2024-07-28 | End: 2024-08-02 | Stop reason: HOSPADM

## 2024-07-28 RX ORDER — ONDANSETRON 2 MG/ML
4 INJECTION INTRAMUSCULAR; INTRAVENOUS EVERY 6 HOURS PRN
Status: DISCONTINUED | OUTPATIENT
Start: 2024-07-28 | End: 2024-08-02 | Stop reason: HOSPADM

## 2024-07-28 RX ORDER — ACETAMINOPHEN 325 MG/1
975 TABLET ORAL ONCE
Status: COMPLETED | OUTPATIENT
Start: 2024-07-28 | End: 2024-07-28

## 2024-07-28 RX ORDER — AMOXICILLIN 250 MG
1 CAPSULE ORAL 2 TIMES DAILY PRN
Status: DISCONTINUED | OUTPATIENT
Start: 2024-07-28 | End: 2024-08-02 | Stop reason: HOSPADM

## 2024-07-28 RX ORDER — PANTOPRAZOLE SODIUM 40 MG/1
40 TABLET, DELAYED RELEASE ORAL 2 TIMES DAILY
Status: DISCONTINUED | OUTPATIENT
Start: 2024-07-28 | End: 2024-08-02 | Stop reason: HOSPADM

## 2024-07-28 RX ORDER — GENTAMICIN SULFATE 1 MG/G
OINTMENT TOPICAL PRN
COMMUNITY

## 2024-07-28 RX ORDER — NICOTINE POLACRILEX 4 MG
15-30 LOZENGE BUCCAL
Status: DISCONTINUED | OUTPATIENT
Start: 2024-07-28 | End: 2024-08-02 | Stop reason: HOSPADM

## 2024-07-28 RX ORDER — ACETAMINOPHEN 325 MG/1
975 TABLET ORAL EVERY 6 HOURS PRN
Status: DISCONTINUED | OUTPATIENT
Start: 2024-07-28 | End: 2024-08-02 | Stop reason: HOSPADM

## 2024-07-28 RX ORDER — PROPRANOLOL HYDROCHLORIDE 20 MG/1
20 TABLET ORAL 2 TIMES DAILY
Status: DISCONTINUED | OUTPATIENT
Start: 2024-07-28 | End: 2024-08-02 | Stop reason: HOSPADM

## 2024-07-28 RX ORDER — ENOXAPARIN SODIUM 150 MG/ML
1 INJECTION SUBCUTANEOUS EVERY 12 HOURS
Status: DISCONTINUED | OUTPATIENT
Start: 2024-07-28 | End: 2024-08-01

## 2024-07-28 RX ORDER — ACETAMINOPHEN 650 MG/1
650 SUPPOSITORY RECTAL EVERY 4 HOURS PRN
Status: DISCONTINUED | OUTPATIENT
Start: 2024-07-28 | End: 2024-08-02 | Stop reason: HOSPADM

## 2024-07-28 RX ORDER — SEMAGLUTIDE 2.68 MG/ML
2 INJECTION, SOLUTION SUBCUTANEOUS
Status: ON HOLD | COMMUNITY
End: 2024-09-09

## 2024-07-28 RX ORDER — ONDANSETRON 4 MG/1
4 TABLET, ORALLY DISINTEGRATING ORAL EVERY 6 HOURS PRN
Status: DISCONTINUED | OUTPATIENT
Start: 2024-07-28 | End: 2024-08-02 | Stop reason: HOSPADM

## 2024-07-28 RX ORDER — AMOXICILLIN 250 MG
2 CAPSULE ORAL 2 TIMES DAILY PRN
Status: DISCONTINUED | OUTPATIENT
Start: 2024-07-28 | End: 2024-08-02 | Stop reason: HOSPADM

## 2024-07-28 RX ORDER — ATORVASTATIN CALCIUM 40 MG/1
40 TABLET, FILM COATED ORAL AT BEDTIME
COMMUNITY

## 2024-07-28 RX ORDER — AMPICILLIN AND SULBACTAM 2; 1 G/1; G/1
3 INJECTION, POWDER, FOR SOLUTION INTRAMUSCULAR; INTRAVENOUS EVERY 6 HOURS
Status: DISCONTINUED | OUTPATIENT
Start: 2024-07-28 | End: 2024-08-02 | Stop reason: HOSPADM

## 2024-07-28 RX ORDER — ATORVASTATIN CALCIUM 20 MG/1
40 TABLET, FILM COATED ORAL AT BEDTIME
Status: DISCONTINUED | OUTPATIENT
Start: 2024-07-28 | End: 2024-08-02 | Stop reason: HOSPADM

## 2024-07-28 RX ORDER — AMPICILLIN AND SULBACTAM 2; 1 G/1; G/1
3 INJECTION, POWDER, FOR SOLUTION INTRAMUSCULAR; INTRAVENOUS ONCE
Status: COMPLETED | OUTPATIENT
Start: 2024-07-28 | End: 2024-07-28

## 2024-07-28 RX ORDER — AMLODIPINE BESYLATE 10 MG/1
10 TABLET ORAL DAILY
Status: DISCONTINUED | OUTPATIENT
Start: 2024-07-28 | End: 2024-08-02 | Stop reason: HOSPADM

## 2024-07-28 RX ORDER — GABAPENTIN 400 MG/1
400 CAPSULE ORAL 3 TIMES DAILY
Status: DISCONTINUED | OUTPATIENT
Start: 2024-07-28 | End: 2024-08-02 | Stop reason: HOSPADM

## 2024-07-28 RX ORDER — MAGNESIUM GLUCONATE 27 MG(500)
500 TABLET ORAL 3 TIMES DAILY
COMMUNITY

## 2024-07-28 RX ORDER — KETOCONAZOLE 20 MG/ML
SHAMPOO TOPICAL
COMMUNITY

## 2024-07-28 RX ORDER — DEXTROSE MONOHYDRATE 25 G/50ML
25-50 INJECTION, SOLUTION INTRAVENOUS
Status: DISCONTINUED | OUTPATIENT
Start: 2024-07-28 | End: 2024-08-02 | Stop reason: HOSPADM

## 2024-07-28 RX ORDER — MORPHINE SULFATE 4 MG/ML
4 INJECTION, SOLUTION INTRAMUSCULAR; INTRAVENOUS
Status: DISCONTINUED | OUTPATIENT
Start: 2024-07-28 | End: 2024-07-28

## 2024-07-28 RX ADMIN — GABAPENTIN 400 MG: 400 CAPSULE ORAL at 11:08

## 2024-07-28 RX ADMIN — GABAPENTIN 400 MG: 400 CAPSULE ORAL at 20:02

## 2024-07-28 RX ADMIN — AMPICILLIN SODIUM AND SULBACTAM SODIUM 3 G: 2; 1 INJECTION, POWDER, FOR SOLUTION INTRAMUSCULAR; INTRAVENOUS at 16:55

## 2024-07-28 RX ADMIN — PANTOPRAZOLE SODIUM 40 MG: 40 TABLET, DELAYED RELEASE ORAL at 20:02

## 2024-07-28 RX ADMIN — AMPICILLIN SODIUM AND SULBACTAM SODIUM 3 G: 2; 1 INJECTION, POWDER, FOR SOLUTION INTRAMUSCULAR; INTRAVENOUS at 22:58

## 2024-07-28 RX ADMIN — ACETAMINOPHEN 975 MG: 325 TABLET, FILM COATED ORAL at 15:57

## 2024-07-28 RX ADMIN — AMPICILLIN SODIUM AND SULBACTAM SODIUM 3 G: 2; 1 INJECTION, POWDER, FOR SOLUTION INTRAMUSCULAR; INTRAVENOUS at 05:02

## 2024-07-28 RX ADMIN — PROPRANOLOL HYDROCHLORIDE 20 MG: 20 TABLET ORAL at 11:08

## 2024-07-28 RX ADMIN — INSULIN DEGLUDEC 35 UNITS: 100 INJECTION, SOLUTION SUBCUTANEOUS at 12:14

## 2024-07-28 RX ADMIN — AMLODIPINE BESYLATE 10 MG: 10 TABLET ORAL at 11:08

## 2024-07-28 RX ADMIN — ACETAMINOPHEN 975 MG: 325 TABLET, FILM COATED ORAL at 04:54

## 2024-07-28 RX ADMIN — PANTOPRAZOLE SODIUM 40 MG: 40 TABLET, DELAYED RELEASE ORAL at 11:08

## 2024-07-28 RX ADMIN — ACETAMINOPHEN 975 MG: 325 TABLET, FILM COATED ORAL at 09:04

## 2024-07-28 RX ADMIN — ENOXAPARIN SODIUM 105 MG: 120 INJECTION SUBCUTANEOUS at 14:52

## 2024-07-28 RX ADMIN — PROPRANOLOL HYDROCHLORIDE 20 MG: 20 TABLET ORAL at 20:02

## 2024-07-28 RX ADMIN — GABAPENTIN 400 MG: 400 CAPSULE ORAL at 15:57

## 2024-07-28 RX ADMIN — AMPICILLIN SODIUM AND SULBACTAM SODIUM 3 G: 2; 1 INJECTION, POWDER, FOR SOLUTION INTRAMUSCULAR; INTRAVENOUS at 10:57

## 2024-07-28 RX ADMIN — ATORVASTATIN CALCIUM 40 MG: 20 TABLET, FILM COATED ORAL at 22:20

## 2024-07-28 ASSESSMENT — ACTIVITIES OF DAILY LIVING (ADL)
ADLS_ACUITY_SCORE: 20
ADLS_ACUITY_SCORE: 21
ADLS_ACUITY_SCORE: 20
ADLS_ACUITY_SCORE: 38
ADLS_ACUITY_SCORE: 38
ADLS_ACUITY_SCORE: 20
ADLS_ACUITY_SCORE: 38
ADLS_ACUITY_SCORE: 38
ADLS_ACUITY_SCORE: 20
ADLS_ACUITY_SCORE: 38
ADLS_ACUITY_SCORE: 20
ADLS_ACUITY_SCORE: 38
ADLS_ACUITY_SCORE: 20
ADLS_ACUITY_SCORE: 20

## 2024-07-28 NOTE — TELEPHONE ENCOUNTER
"Patient was bit on Wednesday by a dog in right hand.  Dog was barking and he was on a three mile hike.  Patient was petting dog and speaking with owner and then patient and owner turned away and patient leaving and owner also and then patient hefeels he was nipped by the dog to pet him longer.  Chills started last night and now hand is swollen badly and cannot move fingers and bite is on the back of hand is very swollen and really painful \"9.5 out of 10\".  Patient has bandage on hand and something is seeping through but not blood.  Patient is unsure if dog had rabies series.      Reason for Disposition   [1] Any break in skin from BITE (e.g., cut, puncture or scratch) AND[2] PET animal (e.g., dog, cat, or ferret) at risk for RABIES (e.g., sick, stray, unprovoked bite, developing country)    Additional Information   Negative: [1] Major bleeding (e.g., actively dripping or spurting) AND [2] can't be stopped   Negative: Sounds like a life-threatening emergency to the triager   Negative: Snake bite   Negative: Bite, wound, or sting from fish   Negative: [1] Any break in skin from BITE (e.g., cut, puncture or scratch) AND[2] WILD animal at risk for RABIES (e.g., bat, raccoon, perla, skunk, coyote, other carnivores; see Background for list.)    Protocols used: Animal Bite-A-    "

## 2024-07-28 NOTE — ED PROVIDER NOTES
Emergency Department Note      History of Present Illness     Chief Complaint   Dog Bite      HPI   Crispin Rojas is a 64 year old male with past with a history of type 2 diabetes, hypertension, hyperlipidemia and DVT currently anticoagulated on Eliquis who presents to the emergency department with swelling, redness and pain to the dorsum of his right hand after dog bite which occurred several days ago.  Swelling and erythema is gradually worsened.  Has had fevers, chills and bodyaches at home.  Patient reports that he was bit by a dog at his apartment.  Notes difficulty with flexion extension of his hand secondary to the swelling on the back of his hand.    Past Medical History     Medical History and Problem List   Past Medical History:   Diagnosis Date    Antiplatelet or antithrombotic long-term use     Ascending aorta dilatation (H24)     Cerebral artery occlusion with cerebral infarction (H)     Cerebral infarction (H)     Gastroesophageal reflux disease with esophagitis     H/O blood clots 2022    Hyperlipidemia LDL goal <70     Hypertension     Nonalcoholic steatohepatitis 11/30/2022    Numbness and tingling     Obesity     GILA (obstructive sleep apnea) 10/22/2018    PVD (peripheral vascular disease) (H24)     Renal stone     Tremor     Type 2 diabetes mellitus with diabetic polyneuropathy, with long-term current use of insulin (H)        Medications   amLODIPine (NORVASC) 5 MG tablet  atorvastatin (LIPITOR) 40 MG tablet  blood glucose (NO BRAND SPECIFIED) lancets standard  calcium carbonate (OS-NARA) 500 MG tablet  Cholecalciferol (VITAMIN D3) 25 MCG (1000 UT) CAPS  Co-Enzyme Q10 100 MG CAPS  Continuous Blood Gluc Sensor (FREESTYLE LUCERO 14 DAY SENSOR) MISC  ELIQUIS ANTICOAGULANT 5 MG tablet  ferrous sulfate (FEROSUL) 325 (65 Fe) MG tablet  gabapentin (NEURONTIN) 100 MG capsule  gentamicin (GARAMYCIN) 0.1 % external ointment  hydrochlorothiazide (MICROZIDE) 12.5 MG capsule  insulin aspart (NOVOLOG PEN)  100 UNIT/ML pen  insulin degludec (TRESIBA) 100 UNIT/ML pen  insulin pen needle (B-D U/F) 31G X 5 MM miscellaneous  ketoconazole (NIZORAL) 2 % external shampoo  lisinopril (ZESTRIL) 40 MG tablet  magnesium oxide (MAG-OX) 400 MG tablet  metFORMIN (GLUCOPHAGE XR) 500 MG 24 hr tablet  Multiple Vitamins-Minerals (MULTIVITAMIN PO)  omeprazole (PRILOSEC) 40 MG DR capsule  propranolol (INDERAL) 20 MG tablet  Semaglutide, 2 MG/DOSE, (OZEMPIC) 8 MG/3ML pen  tadalafil (CIALIS) 5 MG tablet        Surgical History   Past Surgical History:   Procedure Laterality Date    AMPUTATE FOOT Left 8/18/2016    Procedure: AMPUTATE FOOT;  Surgeon: Jack Younger DPM;  Location: [facility]    AMPUTATE TOE(S) Right 2/1/2016    Procedure: AMPUTATE TOE(S);  Surgeon: Rachelle Manriquez DPM, Pod;  Location: [facility]    AMPUTATE TOE(S) Right 8/2/2019    Procedure: Right fourth toe partial amputation for treatment of osteomyelitis.;  Surgeon: Jack Younger DPM;  Location: [facility]    AMPUTATE TOE(S) Left 11/8/2019    Procedure: Left second toe amputation at the metatarsophalangeal joint.;  Surgeon: Rachelle Manriquez DPM, Podiatry/Foot and Ankle Surgery;  Location: [facility]    AMPUTATE TOE(S) Right 6/5/2022    Procedure: AMPUTATION OF RIGHT GREAT TOE;  Surgeon: Wili Quiles DPM;  Location: [facility]    AMPUTATE TOE(S) Right 7/28/2022    Procedure: Right foot partial first metatarsal resection;  Surgeon: Tiny Soto DPM;  Location: [facility]    AMPUTATE TOE(S) Left 6/18/2024    Procedure: Left foot transmetatarsal amputation;  Surgeon: Tiny Soto DPM;  Location: [facility]    ANGIOGRAM      BIOPSY BONE FOOT Right 7/24/2022    Procedure: Bone biopsy, right first metatarsal.;  Surgeon: Valentino Molina DPM;  Location: [facility]    COLONOSCOPY      COMBINED CYSTOSCOPY, RETROGRADES, URETEROSCOPY, INSERT STENT Left 8/21/2016    Procedure: COMBINED CYSTOSCOPY, RETROGRADES, URETEROSCOPY, INSERT STENT;  Surgeon: Artemio Valenzuela MD;  Location:  [facility]    COMBINED CYSTOSCOPY, RETROGRADES, URETEROSCOPY, LASER HOLMIUM LITHOTRIPSY URETER(S), INSERT STENT Left 10/3/2016    Procedure: COMBINED CYSTOSCOPY, RETROGRADES, URETEROSCOPY, LASER HOLMIUM LITHOTRIPSY URETER(S), INSERT STENT;  Surgeon: Artemio Valenzuela MD;  Location: [facility]    EXTRACORPOREAL SHOCK WAVE LITHOTRIPSY (ESWL) Left 9/8/2016    Procedure: EXTRACORPOREAL SHOCK WAVE LITHOTRIPSY (ESWL);  Surgeon: Artemio Valenzuela MD;  Location: [facility]    INCISION AND DRAINAGE FOOT, COMBINED Right 7/24/2022    Procedure: Incision and drainage, right foot ;  Surgeon: Valentnio Molina DPM;  Location: [facility]    IRRIGATION AND DEBRIDEMENT FOOT, COMBINED Left 10/19/2023    Procedure: Left foot second metatarsal resection , . Left plantar ulcer excisional debridement, down to and including tendon, with closure, site measuring 4 cm x 2 cm x 1 cm;  Surgeon: Tiny Soto DPM;  Location: [facility]    OSTEOTOMY FOOT Right 11/30/2022    Procedure: 1. Right second metatarsal floating osteotomy  2. Right second digit proximal phalanx arthroplasty 3. Right percutaneous flexor tenotomy;  Surgeon: Tiny Soto DPM;  Location: [facility]    OSTEOTOMY FOOT Right 1/19/2023    Procedure: Right foot third metatarsal head excision, ulcer debridement, matatarsal osteotomies;  Surgeon: Tiny Soto DPM;  Location: [facility]    RECESSION GASTROCNEMIUS Right 2/1/2016    Procedure: RECESSION GASTROCNEMIUS;  Surgeon: Rachelle Manriquez DPM, Pod;  Location: [facility]       Physical Exam     Patient Vitals for the past 24 hrs:   BP Temp Pulse Resp SpO2 Height Weight   07/28/24 0630 (!) 161/73 -- 86 -- 95 % -- --   07/28/24 0622 -- -- -- -- 91 % -- --   07/28/24 0621 -- -- -- -- 91 % -- --   07/28/24 0620 -- -- -- -- 91 % -- --   07/28/24 0619 -- -- -- -- 90 % -- --   07/28/24 0618 -- -- -- -- 90 % -- --   07/28/24 0617 (!) 160/76 -- 91 -- -- -- --   07/28/24 0549 -- -- -- -- 92 % -- --   07/28/24 0548 -- -- -- -- 91 % -- --  "  07/28/24 0547 -- -- -- -- 91 % -- --   07/28/24 0546 -- -- -- -- 91 % -- --   07/28/24 0531 (!) 151/78 -- 87 -- 94 % -- --   07/28/24 0521 -- -- -- -- 92 % -- --   07/28/24 0511 -- -- -- -- 93 % -- --   07/28/24 0501 (!) 155/82 -- -- -- -- -- --   07/28/24 0451 -- -- -- -- 94 % -- --   07/28/24 0441 (!) 156/79 -- 89 -- 95 % -- --   07/28/24 0412 (!) 184/86 99.4  F (37.4  C) 90 18 100 % -- --   07/28/24 0408 -- -- -- -- -- 1.803 m (5' 11\") 111.9 kg (246 lb 11.1 oz)     Physical Exam  General: Patient is alert, awake and interactive  Head: The scalp, face, and head appear normal  Eyes: Conjunctivae are normal  ENT: The nose is normal, Pinnae are normal, External acoustic canals are normal  Neck: Trachea midline  CV: Pulses are normal.   Resp: No respiratory distress   Musc: Normal muscular tone, moving all extremities.  Right hand Exam:  Laceration: 1 cm well-healed laceration over the dorsum of the right hand without underlying fluctuance.  No drainage.  Palm: Normal  MCP: Difficulty with flexion extension secondary to swelling  PIP: full flex/ext  DIP: full flex/ext  Cap refil: < 2 seconds  Sensation: Intact to light touch  Radial pulse normal  Ulnar pulse  normal  Skin: Faint erythema to the dorsum of the right hand with faint streaking going up the right forearm  Neuro:  Speech is normal and fluent. Face is symmetric.   Psych: Normal affect.  Appropriate interactions.          Diagnostics     Lab Results   Labs Ordered and Resulted from Time of ED Arrival to Time of ED Departure   CBC WITH PLATELETS AND DIFFERENTIAL - Abnormal       Result Value    WBC Count 9.0      RBC Count 3.98 (*)     Hemoglobin 11.4 (*)     Hematocrit 35.3 (*)     MCV 89      MCH 28.6      MCHC 32.3      RDW 14.3      Platelet Count 154      % Neutrophils 74      % Lymphocytes 14      % Monocytes 9      % Eosinophils 3      % Basophils 0      % Immature Granulocytes 0      NRBCs per 100 WBC 0      Absolute Neutrophils 6.6      Absolute " Lymphocytes 1.3      Absolute Monocytes 0.8      Absolute Eosinophils 0.2      Absolute Basophils 0.0      Absolute Immature Granulocytes 0.0      Absolute NRBCs 0.0     LACTIC ACID WHOLE BLOOD WITH 1X REPEAT IN 2 HR WHEN >2 - Normal    Lactic Acid, Initial 1.7     BASIC METABOLIC PANEL   CRP INFLAMMATION   BLOOD CULTURE       Imaging   XR Hand Right G/E 3 Views   Final Result   IMPRESSION: Diffuse mild degenerative narrowing of interphalangeal in the third metacarpophalangeal joint spaces. Atherosclerotic calcification seen in the radial artery. No acute fracture or retained radiopaque foreign bodies. Mild soft tissue swelling    is seen in the hand.          ED Course      Medications Administered   Medications   acetaminophen (TYLENOL) tablet 975 mg (975 mg Oral $Given 7/28/24 2962)   ampicillin-sulbactam (UNASYN) 3 g vial to attach to  mL bag (0 g Intravenous Stopped 7/28/24 0627)         Discussion of Management   Spoke with Dr. Hunter of the hospitalist team regarding admission    Spoke with Dr. Mcdaniels of orthopedic surgery        Medical Decision Making / Diagnosis     MDM   Crispin Rojas is a 64 year old male who presents to the emergency department with swelling to the dorsum of his right hand after sustaining a dog bite several days ago.  At this juncture he appears to have a soft tissue skin infection to the dorsum of his right hand with streaking redness going up his right arm.  There is no evidence of abscess or fluctuance.  Location does not seem consistent with tenosynovitis.  Discussed the case with orthopedic surgery.  Will evaluate the patient once admitted.  No indication for washout at this juncture.  Will be started on IV antibiotics.  Will be admitted for further evaluation and treatment.    Disposition   The patient was admitted to the hospital.     Diagnosis     ICD-10-CM    1. Dog bite of hand, right, initial encounter  S61.451A     W54.0XXA       2. Cellulitis of right upper  extremity  L03.113            MD Mary Cutler, Erwin Garland MD  07/28/24 0642

## 2024-07-28 NOTE — ED TRIAGE NOTES
Here for wound check of right hand and into forearm. Was bit by dog on Wednesday around 1900, washed hand out after and has been keeping abx ointment on bite. States tonight, hand began swelling and is extremely painful. Patient now having whole body tremors, feeling fever/chills. Cap refill in affected hand greater than 2. Hand significantly swollen in comparison to left. No pain meds PTA. Unknown vaccination status of dog

## 2024-07-28 NOTE — ED NOTES
[facility]  ED Nurse Handoff Report    ED Chief complaint: Dog Bite  . ED Diagnosis:   Final diagnoses:   Dog bite of hand, right, initial encounter   Cellulitis of right upper extremity       Allergies:   Allergies   Allergen Reactions    Statins Swelling     Other reaction(s): Other (see comments)  SIMCOR caused edema in extremities      Bactrim [Sulfamethoxazole-Trimethoprim] Nausea and Vomiting    Sulfatolamide Nausea and Vomiting    Niacin Swelling     SIMCOR caused edema in extremities    Simvastatin Swelling     SIMCOR caused edema in extremities    Sulfa Antibiotics Nausea       Code Status: Full Code    Activity level - Baseline/Home:  independent.  Activity Level - Current:   standby.   Lift room needed: No.   Bariatric: No   Needed: No   Isolation: No.   Infection: Not Applicable.     Respiratory status: Room air    Vital Signs (within 30 minutes):   Vitals:    07/28/24 0546 07/28/24 0547 07/28/24 0548 07/28/24 0549   BP:       Pulse:       Resp:       Temp:       SpO2: 91% 91% 91% 92%   Weight:       Height:           Cardiac Rhythm:  ,      Pain level:    Patient confused: No.   Patient Falls Risk: patient and family education.   Elimination Status: Has voided     Patient Report - Initial Complaint: Dog bite.   Focused Assessment: SkinSkin WDL: .WDL except; all (Pt had dog bite to R hand. hand red and swollen.)      Abnormal Results:   Labs Ordered and Resulted from Time of ED Arrival to Time of ED Departure   CBC WITH PLATELETS AND DIFFERENTIAL - Abnormal       Result Value    WBC Count 9.0      RBC Count 3.98 (*)     Hemoglobin 11.4 (*)     Hematocrit 35.3 (*)     MCV 89      MCH 28.6      MCHC 32.3      RDW 14.3      Platelet Count 154      % Neutrophils 74      % Lymphocytes 14      % Monocytes 9      % Eosinophils 3      % Basophils 0      % Immature Granulocytes 0      NRBCs per 100 WBC 0      Absolute Neutrophils 6.6      Absolute Lymphocytes 1.3      Absolute  Monocytes 0.8      Absolute Eosinophils 0.2      Absolute Basophils 0.0      Absolute Immature Granulocytes 0.0      Absolute NRBCs 0.0     LACTIC ACID WHOLE BLOOD WITH 1X REPEAT IN 2 HR WHEN >2 - Normal    Lactic Acid, Initial 1.7     BASIC METABOLIC PANEL   BLOOD CULTURE        XR Hand Right G/E 3 Views   Final Result   IMPRESSION: Diffuse mild degenerative narrowing of interphalangeal in the third metacarpophalangeal joint spaces. Atherosclerotic calcification seen in the radial artery. No acute fracture or retained radiopaque foreign bodies. Mild soft tissue swelling    is seen in the hand.          Treatments provided: See MAR. Abx. Frequent monitoring of pt.  Family Comments: N/a  OBS brochure/video discussed/provided to patient:  Yes  ED Medications:   Medications   acetaminophen (TYLENOL) tablet 975 mg (975 mg Oral $Given 7/28/24 0609)   ampicillin-sulbactam (UNASYN) 3 g vial to attach to  mL bag (0 g Intravenous Stopped 7/28/24 2283)       Drips infusing:  No  For the majority of the shift this patient was Green.   Interventions performed were N/a.    Sepsis treatment initiated: No    Cares/treatment/interventions/medications to be completed following ED care: N/a    ED Nurse Name: Fabby Gramajo RN  6:19 AM      RECEIVING UNIT ED HANDOFF REVIEW    Above ED Nurse Handoff Report was reviewed: Yes  Reviewed by: Leena De Anda RN on July 28, 2024 at 11:50 AM   I Ryan called the ED to inform them the note was read: Yes

## 2024-07-28 NOTE — ED NOTES
OUTPATIENT/OBSERVATION GOALS TO BE MET BEFORE DISCHARGE:  ADLs back to baseline: No    Activity and level of assistance: Ambulating independently.    Pain status: Improved-controlled with oral pain medications.    Return to near baseline physical activity: No     Discharge Planner Nurse   Safe discharge environment identified: Yes  Barriers to discharge: Yes       Entered by: Lita Staples RN 07/28/2024 800     Please review provider order for any additional goals.   Nurse to notify provider when observation goals have been met and patient is ready for discharge.    Pt A&O x4. NPO. C/o pain in hand- tylenol given. VSS, except LGF.

## 2024-07-28 NOTE — PROGRESS NOTES
Right hand cellulitis  -evaluated by ortho, continue current management  - continue IV Unasyn    RLE edema  US ordered lower ext, US  6/17 negative for dvt    Hx of DVT  Hold home Eliquis 5 mg BID, Lovenox while here if surgical intervention is needed pending clinical course    GILA  -cpap ordered    Chronic left foot diabetic foot ulcer  Hx of L foot osteo  -appears to be healing

## 2024-07-28 NOTE — PHARMACY-ADMISSION MEDICATION HISTORY
Pharmacy Intern Admission Medication History    Admission medication history is complete. The information provided in this note is only as accurate as the sources available at the time of the update.    Information Source(s): Patient and CareEverywhere/SureScripts via in-person    Pertinent Information:   For patients on insulin therapy:  Do you use sliding scale insulin based on blood sugars? Yes  Do you typically eat three meals a day? Yes  How many times do you check your blood glucose per day? Continuous (sensor)  How many episodes of hypoglycemia do you typically have per month? Twice weekly ,70 mg/dL     Changes made to PTA medication list:  Added: None  Deleted: None  Changed:   Gentamicin ->PRN  NOVOLOG  1 u/9 g carbs->1 units of insulin per 3 g  Mg oxide 400 mg->Mg gluconate 500 mg    Allergies reviewed with patient and updates made in EHR: yes    Medication History Completed By: Matias Arreguin 7/28/2024 9:50 AM    PTA Med List   Medication Sig Last Dose    amLODIPine (NORVASC) 5 MG tablet Take 2 tablets (10 mg) by mouth daily 7/27/2024 at am    atorvastatin (LIPITOR) 40 MG tablet Take 40 mg by mouth at bedtime 7/27/2024 at pm    calcium carbonate (OS-NARA) 500 MG tablet Take 1 tablet by mouth 2 times daily 7/27/2024 at pm    Cholecalciferol (VITAMIN D3) 25 MCG (1000 UT) CAPS Take 1,000 Units by mouth daily  7/27/2024 at am    Co-Enzyme Q10 100 MG CAPS Take 100 mg by mouth daily  7/27/2024 at am    ELIQUIS ANTICOAGULANT 5 MG tablet Take 1 tablet (5 mg) by mouth 2 times daily 7/27/2024 at pm    ferrous sulfate (FEROSUL) 325 (65 Fe) MG tablet Take 325 mg by mouth daily (with breakfast) 7/27/2024 at am    gabapentin (NEURONTIN) 100 MG capsule Take 400 mg by mouth 3 times daily 7/27/2024 at pm    gentamicin (GARAMYCIN) 0.1 % external ointment Apply topically as needed Unknown at PRN    hydrochlorothiazide (MICROZIDE) 12.5 MG capsule Take 1 capsule (12.5 mg) by mouth every morning 7/27/2024 at AM    insulin aspart  (NOVOLOG PEN) 100 UNIT/ML pen Inject subcutaneously 3 times daily (with meals) Inject 1 unit of insulin for every 3 grams of carbs 7/27/2024 at PM    insulin degludec (TRESIBA) 100 UNIT/ML pen Inject 35-55 Units Subcutaneous every morning 7/27/2024 at AM    ketoconazole (NIZORAL) 2 % external shampoo Apply topically twice a week On Tuesday and Friday 7/26/2024    lisinopril (ZESTRIL) 40 MG tablet Take 1 tablet (40 mg) by mouth daily 7/27/2024 at AM    magnesium gluconate (MAGONATE) 500 MG tablet Take 500 mg by mouth 3 times daily 7/27/2024 at PM    metFORMIN (GLUCOPHAGE XR) 500 MG 24 hr tablet TAKE 2 TABLETS(1000 MG) BY MOUTH TWICE DAILY 7/27/2024 at PM    Multiple Vitamins-Minerals (MULTIVITAMIN PO) Take 1 tablet by mouth daily  7/27/2024 at AM    omeprazole (PRILOSEC) 40 MG DR capsule Take 40 mg by mouth daily 7/27/2024 at AM    propranolol (INDERAL) 20 MG tablet Take 20 mg by mouth 2 times daily 7/27/2024 at PM    Semaglutide, 2 MG/DOSE, (OZEMPIC, 2 MG/DOSE,) 8 MG/3ML pen Inject 2 mg subcutaneously every 7 days On Wednesday 7/24/2024    tadalafil (CIALIS) 5 MG tablet TAKE 1 TABLET BY MOUTH EVERY DAY AS NEEDED one hour before intercourse Unknown at PRN

## 2024-07-28 NOTE — H&P
[facility]    History and Physical - Hospitalist Service       Date of Admission:  7/28/2024    Assessment & Plan      Crispin Rojas is a 64 year old male admitted on 7/28/2024. He has history of recurrent DVT on apixaban, CVA, HTN, HLD, DM2, PVD, GILA that presents with an infection of his right hand after a dog bite.    Likely cellulitis  Right hand pain/swelling  DDx cellulitis v abscess v tendon synovitis.  No crepitus on exam to suggest necrotizing fasciitis.  Do not feel fluctuance on exam.  However the rapid increase in pain and swelling in the last 12 hours prior to admission argues for a deeper infection.  -Does not meet SIRS criteria on admission, not septic and mentating normally  -Tylenol for pain control  -Orthopedics consult  -Continue Unasyn started in the ED  -N.p.o. until orthopedic evaluation   -Add on CRP  -Follow blood culture  -Consider MRI of hand after orthopedics eval  -Tetanus vaccine was given 2022  -Discussed rabies prophylaxis with the patient.  He prefers to hold off given low risk as this was a domesticated animal.    DM2  -Continue degludec 35 units daily  -Medium intensity sliding scale insulin  -Hold metformin  -pt is also on scheduled aspart 9 units/gm carb PTA    Recurrent DVT  -Holding apixaban until orthopedics eval  -Last took 0100 on 07/28    History of CVA  -Continue statin  -Restart apixaban when able    HTN  -Continue amlodipine, propranolol  -Hold HCTZ and lisinopril    HLD  -Continue atorvastatin 40 mg daily    Pain/neuropathy   -continue gabapentin    Obesity  -Will require extra nursing resources routine cares    Diet:   n.p.o. pending orthopedics eval, no IVF  DVT Prophylaxis: SCDs, will need to restart apixaban if not going to surgery or start Lovenox  Mccarthy Catheter: Not present  Lines: None     Cardiac Monitoring: None  Code Status:  Full code, discussed option    Clinically Significant Risk Factors Present on Admission        Disposition  Plan     Medically Ready for Discharge: Anticipated Tomorrow    Wili Hunter MD  Hospitalist Service  [facility]  Securely message with [id number] | [employer] (more info)  Text page via [employer] Paging/Directory     ______________________________________________________________________    Chief Complaint   Right hand pain and swelling    History is obtained from the patient    History of Present Illness   Crispin Rojas is a 64 year old male with PMH most notable for DVT on apixaban, CVA, HTN, HLD, DM2, PVD, GILA that presents with right hand pain and swelling.    The patient was petting a unknown dog that belonged to a neighbor 5 days prior to admission.  The patient suddenly moved and the dog bit him on his right hand.  He does not know if this dog was vaccinated.  He states he had been doing well until last evening around 1700 he started to develop swelling and severe pain in his right hand.  Now he cannot make a fist due to the pain.  He has subjective fever but no rigors.  No chest pain, dyspnea, abdominal pain, vomiting, diarrhea, urinary complaints.  He last had his apixaban at 0100 this morning.    ED course:  -AF, HR 80s, /90s, RR 18 and 94% on RA  -WBC 9.0  -Blood cultures obtained  -Given Tylenol and Unasyn  -X-ray right hand with no gas and mild soft tissue swelling.      Past Medical History    Past Medical History:   Diagnosis Date    Antiplatelet or antithrombotic long-term use     Ascending aorta dilatation (H24)     Cerebral artery occlusion with cerebral infarction (H)     Cerebral infarction (H)     2010    Gastroesophageal reflux disease with esophagitis     H/O blood clots 2022    Hyperlipidemia LDL goal <70     Hypertension     Nonalcoholic steatohepatitis 11/30/2022    Numbness and tingling     Obesity     GILA (obstructive sleep apnea) 10/22/2018    CPAP intolerant    PVD (peripheral vascular disease) (H24)     s/p left partial toe amputation    Renal stone     Tremor     Type  2 diabetes mellitus with diabetic polyneuropathy, with long-term current use of insulin (H)        Past Surgical History   Past Surgical History:   Procedure Laterality Date    AMPUTATE FOOT Left 8/18/2016    Procedure: AMPUTATE FOOT;  Surgeon: Jack Younger DPM;  Location: [facility] OR    AMPUTATE TOE(S) Right 2/1/2016    Procedure: AMPUTATE TOE(S);  Surgeon: Rachelle Manriquez DPM, Pod;  Location: [facility] OR    AMPUTATE TOE(S) Right 8/2/2019    Procedure: Right fourth toe partial amputation for treatment of osteomyelitis.;  Surgeon: Jack Younger DPM;  Location: [facility]    AMPUTATE TOE(S) Left 11/8/2019    Procedure: Left second toe amputation at the metatarsophalangeal joint.;  Surgeon: Rachelle Manriquez DPM, Podiatry/Foot and Ankle Surgery;  Location: [facility]    AMPUTATE TOE(S) Right 6/5/2022    Procedure: AMPUTATION OF RIGHT GREAT TOE;  Surgeon: Wili Quiles DPM;  Location: [facility]    AMPUTATE TOE(S) Right 7/28/2022    Procedure: Right foot partial first metatarsal resection;  Surgeon: Tiny Soto DPM;  Location: [facility]    AMPUTATE TOE(S) Left 6/18/2024    Procedure: Left foot transmetatarsal amputation;  Surgeon: Tiny Soto DPM;  Location: [facility]    ANGIOGRAM      BIOPSY BONE FOOT Right 7/24/2022    Procedure: Bone biopsy, right first metatarsal.;  Surgeon: Valentino Molina DPM;  Location: [facility]    COLONOSCOPY      COMBINED CYSTOSCOPY, RETROGRADES, URETEROSCOPY, INSERT STENT Left 8/21/2016    Procedure: COMBINED CYSTOSCOPY, RETROGRADES, URETEROSCOPY, INSERT STENT;  Surgeon: Artemio Valenzuela MD;  Location: [facility]    COMBINED CYSTOSCOPY, RETROGRADES, URETEROSCOPY, LASER HOLMIUM LITHOTRIPSY URETER(S), INSERT STENT Left 10/3/2016    Procedure: COMBINED CYSTOSCOPY, RETROGRADES, URETEROSCOPY, LASER HOLMIUM LITHOTRIPSY URETER(S), INSERT STENT;  Surgeon: Artemio Valenzuela MD;  Location: [facility]    EXTRACORPOREAL SHOCK WAVE LITHOTRIPSY (ESWL) Left 9/8/2016    Procedure: EXTRACORPOREAL SHOCK  WAVE LITHOTRIPSY (ESWL);  Surgeon: Artemio Valenzuela MD;  Location: [facility]    INCISION AND DRAINAGE FOOT, COMBINED Right 7/24/2022    Procedure: Incision and drainage, right foot ;  Surgeon: Valentino Molina DPM;  Location: [facility]    IRRIGATION AND DEBRIDEMENT FOOT, COMBINED Left 10/19/2023    Procedure: Left foot second metatarsal resection , . Left plantar ulcer excisional debridement, down to and including tendon, with closure, site measuring 4 cm x 2 cm x 1 cm;  Surgeon: Tiny Soto DPM;  Location: [facility]    OSTEOTOMY FOOT Right 11/30/2022    Procedure: 1. Right second metatarsal floating osteotomy  2. Right second digit proximal phalanx arthroplasty 3. Right percutaneous flexor tenotomy;  Surgeon: Tiny Soto DPM;  Location: [facility]    OSTEOTOMY FOOT Right 1/19/2023    Procedure: Right foot third metatarsal head excision, ulcer debridement, matatarsal osteotomies;  Surgeon: Tiny Soto DPM;  Location: [facility]    RECESSION GASTROCNEMIUS Right 2/1/2016    Procedure: RECESSION GASTROCNEMIUS;  Surgeon: Rachelle Manriquez DPM, Pod;  Location: [facility]       Prior to Admission Medications   Prior to Admission Medications   Prescriptions Last Dose Informant Patient Reported? Taking?   Cholecalciferol (VITAMIN D3) 25 MCG (1000 UT) CAPS   Yes No   Sig: Take 1,000 Units by mouth daily    Co-Enzyme Q10 100 MG CAPS   Yes No   Sig: Take 100 mg by mouth daily    Continuous Blood Gluc Sensor (FREESTYLE LUCERO 14 DAY SENSOR) MISC   No No   Sig: USE ONE EVERY 14 DAYS   ELIQUIS ANTICOAGULANT 5 MG tablet   No No   Sig: Take 1 tablet (5 mg) by mouth 2 times daily   Multiple Vitamins-Minerals (MULTIVITAMIN PO)   Yes No   Sig: Take 1 tablet by mouth daily    Semaglutide, 2 MG/DOSE, (OZEMPIC) 8 MG/3ML pen   No No   Sig: Inject 2 mg Subcutaneous every 7 days   amLODIPine (NORVASC) 5 MG tablet   No No   Sig: Take 2 tablets (10 mg) by mouth daily   atorvastatin (LIPITOR) 40 MG tablet   No No   Sig: Take 1 tablet (40  mg) by mouth daily Take one tablet daily SEE PROVIDER FOR NEXT REFILL   blood glucose (NO BRAND SPECIFIED) lancets standard   No No   Sig: Use to test blood sugar 3 times daily or as directed.   calcium carbonate (OS-NARA) 500 MG tablet   Yes No   Sig: Take 1 tablet by mouth 2 times daily   ferrous sulfate (FEROSUL) 325 (65 Fe) MG tablet   Yes No   Sig: Take 325 mg by mouth daily (with breakfast)   gabapentin (NEURONTIN) 100 MG capsule   Yes No   Sig: Take 400 mg by mouth 3 times daily   gentamicin (GARAMYCIN) 0.1 % external ointment   No No   Sig: Apply 5g topically to wound with bandage changes.   hydrochlorothiazide (MICROZIDE) 12.5 MG capsule   No No   Sig: Take 1 capsule (12.5 mg) by mouth every morning   insulin aspart (NOVOLOG PEN) 100 UNIT/ML pen   Yes No   Sig: Inject Subcutaneous 3 times daily (with meals) Inject 1 unit of insulin for every 9 grams of carbs   insulin degludec (TRESIBA) 100 UNIT/ML pen   Yes No   Sig: Inject 35-55 Units Subcutaneous every morning   insulin pen needle (B-D U/F) 31G X 5 MM miscellaneous   No No   Sig: USE TO INJECT LANTUS TWICE DAILY AND NOVOLOG THREE TIMES DAILY AS DIRECTED   ketoconazole (NIZORAL) 2 % external shampoo   No No   Sig: Apply topically daily as needed for itching or irritation See MD after 6 weeks   lisinopril (ZESTRIL) 40 MG tablet   No No   Sig: Take 1 tablet (40 mg) by mouth daily   magnesium oxide (MAG-OX) 400 MG tablet   Yes No   Sig: Take 400 mg by mouth 3 times daily   metFORMIN (GLUCOPHAGE XR) 500 MG 24 hr tablet   No No   Sig: TAKE 2 TABLETS(1000 MG) BY MOUTH TWICE DAILY   omeprazole (PRILOSEC) 40 MG DR capsule   Yes No   Sig: Take 40 mg by mouth daily   propranolol (INDERAL) 20 MG tablet   Yes No   Sig: Take 20 mg by mouth 2 times daily   tadalafil (CIALIS) 5 MG tablet   No No   Sig: TAKE 1 TABLET BY MOUTH EVERY DAY AS NEEDED one hour before intercourse      Facility-Administered Medications: None        Review of Systems    The 10 point Review of  Systems is negative other than noted in the HPI or here.     Social History   I have reviewed this patient's social history and updated it with pertinent information if needed.  Social History     Tobacco Use    Smoking status: Never    Smokeless tobacco: Never   Vaping Use    Vaping status: Never Used   Substance Use Topics    Alcohol use: Yes     Comment: rare---red wine 3x per month    Drug use: Never         Family History   I have reviewed this patient's family history and updated it with pertinent information if needed.  Family History   Problem Relation Age of Onset    Arthritis Mother         SLE    Connective Tissue Disorder Mother         lupus    Diabetes Mother     Cerebrovascular Disease Mother     Cancer Mother     Diabetes Father     Coronary Artery Disease Father     Chronic Obstructive Pulmonary Disease Father     Diabetes Sister     Diabetes Maternal Grandfather          Allergies   Allergies   Allergen Reactions    Statins Swelling     Other reaction(s): Other (see comments)  SIMCOR caused edema in extremities      Bactrim [Sulfamethoxazole-Trimethoprim] Nausea and Vomiting    Sulfatolamide Nausea and Vomiting    Niacin Swelling     SIMCOR caused edema in extremities    Simvastatin Swelling     SIMCOR caused edema in extremities    Sulfa Antibiotics Nausea        Physical Exam   Vital Signs: Temp: 99.4  F (37.4  C)   BP: (!) 160/76 Pulse: 91   Resp: 18 SpO2: 91 %      Weight: 246 lbs 11.12 oz    Constitutional: awake, alert, cooperative, no apparent distress, and appears stated age  Respiratory: No increased work of breathing, good air exchange, clear to auscultation bilaterally, no crackles or wheezing  Cardiovascular: Normal apical impulse, regular rate and rhythm  GI: NT/ND  Skin: no bruising or bleeding  Musculoskeletal: Right hand with edema and warmth.  Puncture wound on the dorsal surface without drainage.  Unable to make a fist due to pain.  Edema extends up the forearm.  The edema is  warm.  There is no fluctuance about the wound.  There is no crepitus on exam.    Medical Decision Making       75 MINUTES SPENT BY ME on the date of service doing chart review, history, exam, documentation & further activities per the note.      Data     I have personally reviewed the following data over the past 24 hrs:    9.0  \   11.4 (L)   / 154     137 102 25.2 (H) /  98   4.3 20 (L) 1.17 \     Procal: N/A CRP: N/A Lactic Acid: 1.7         Imaging results reviewed over the past 24 hrs:   Recent Results (from the past 24 hour(s))   XR Hand Right G/E 3 Views    Narrative    EXAM: XR HAND RIGHT G/E 3 VIEWS  LOCATION: [facility]  DATE: 7/28/2024    INDICATION: dog bite to the hand, rule out foreign body and fracture  COMPARISON: None.      Impression    IMPRESSION: Diffuse mild degenerative narrowing of interphalangeal in the third metacarpophalangeal joint spaces. Atherosclerotic calcification seen in the radial artery. No acute fracture or retained radiopaque foreign bodies. Mild soft tissue swelling   is seen in the hand.

## 2024-07-28 NOTE — PLAN OF CARE
"  Problem: Adult Inpatient Plan of Care  Goal: Plan of Care Review  Description: The Plan of Care Review/Shift note should be completed every shift.  The Outcome Evaluation is a brief statement about your assessment that the patient is improving, declining, or no change.  This information will be displayed automatically on your shift  note.  Outcome: Progressing  Flowsheets (Taken 7/28/2024 1712)  Outcome Evaluation: vss afebrile. pt feels like he has the chills. tylenol given x1, pain 5 in right hand, palm red, puncture site scabbed. right arm elevated on pillows. abx given as ordered. ac/hs bg.  Plan of Care Reviewed With: patient  Overall Patient Progress: no change  Goal: Patient-Specific Goal (Individualized)  Description: You can add care plan individualizations to a care plan. Examples of Individualization might be:  \"Parent requests to be called daily at 9am for status\", \"I have a hard time hearing out of my right ear\", or \"Do not touch me to wake me up as it startles  me\".  Outcome: Progressing  Flowsheets (Taken 7/28/2024 1200)  Individualized Care Needs: none  Goal: Absence of Hospital-Acquired Illness or Injury  Outcome: Progressing  Intervention: Identify and Manage Fall Risk  Recent Flowsheet Documentation  Taken 7/28/2024 1250 by Leena Stoddard RN  Safety Promotion/Fall Prevention:   activity supervised   safety round/check completed  Intervention: Prevent Skin Injury  Recent Flowsheet Documentation  Taken 7/28/2024 1250 by Leena Stoddard RN  Body Position: position changed independently  Intervention: Prevent Infection  Recent Flowsheet Documentation  Taken 7/28/2024 1250 by Leena Stoddard RN  Infection Prevention: hand hygiene promoted  Goal: Optimal Comfort and Wellbeing  Outcome: Progressing  Goal: Readiness for Transition of Care  Outcome: Progressing  Intervention: Mutually Develop Transition Plan  Recent Flowsheet Documentation  Taken 7/28/2024 1200 by Matilda De Anda" Leena YANES RN  Equipment Currently Used at Home: none     Problem: Comorbidity Management  Goal: Blood Glucose Levels Within Targeted Range  Outcome: Progressing  Intervention: Monitor and Manage Glycemia  Recent Flowsheet Documentation  Taken 7/28/2024 1250 by Leena Stoddard RN  Medication Review/Management: medications reviewed  Goal: Blood Pressure in Desired Range  Outcome: Progressing  Intervention: Maintain Blood Pressure Management  Recent Flowsheet Documentation  Taken 7/28/2024 1250 by Leena Stoddard RN  Medication Review/Management: medications reviewed     Problem: Skin or Soft Tissue Infection  Goal: Absence of Infection Signs and Symptoms  Outcome: Progressing  Intervention: Minimize and Manage Infection Progression  Recent Flowsheet Documentation  Taken 7/28/2024 1250 by Leena Stoddard RN  Infection Prevention: hand hygiene promoted       Goal Outcome Evaluation:      Plan of Care Reviewed With: patient    Overall Patient Progress: no changeOverall Patient Progress: no change    Outcome Evaluation: vss afebrile. pt feels like he has the chills. tylenol given x1, pain 5 in right hand, palm red, puncture site scabbed. right arm elevated on pillows. abx given as ordered. ac/hs bg.

## 2024-07-28 NOTE — PLAN OF CARE
Goal Outcome Evaluation:         [facility]    ED Boarding Nurse Handoff Addendum Report:    Date/time: 7/28/2024, 12:39 PM    Activity Level: standby    Fall Risk: No    Active Infusions: No    Current Meds Due: See MAR    Current care needs: Per POC- ABX    Oxygen requirements (liters/min and/or FiO2): no    Respiratory status: Room air    Vital signs (within last 30 minutes):    Vitals:    07/28/24 0645 07/28/24 0700 07/28/24 0900 07/28/24 1108   BP:  (!) 154/75 (!) 148/72 137/63   BP Location:   Left arm    Patient Position:   Supine    Pulse:  89     Resp:       Temp:   99.6  F (37.6  C)    TempSrc:   Oral    SpO2: 92% 93%     Weight:       Height:           Focused assessment within last 30 minutes:    Pt A&O x4. VSS. Pain in R hand- given tylenol 9 AM which helped and elevation of hand. Insulin pens sent with transport    ED Boarding Nurse name: Lita Staples RN

## 2024-07-28 NOTE — CONSULTS
[facility]    Orthopedic Consultation    Crispin Rojas MRN# [id number]   Age: 64 year old YOB: 1960     Date of Admission:  7/28/2024    Reason for consult:   concern for abscess versus tendon synovitis on right hand after dog bite          Requesting physician: Wili Hunter MD        Level of consult: Consult, follow and place orders           Assessment and Plan:   Assessment:   Dog bite of right hand, infection       Plan:   The patient's history and clinical/diagnostic findings were reviewed with the on-call orthopedic hand surgeon, Dr. Mcdaniels. No teeth marks noted flexor surface, there is global right hand swelling but no distinct fusiform swelling. Dr. Mcdaniels not suspecting flexor tenosynovitis at this time. Recommendations as follows:   - IV antibiotics   - Rest right hand, splint not necessary at this time.   - Continue to elevate   - We will continue to monitor this progression during this admission.     Please contact orthopedic trauma team if any questions or concerns arise.           Chief Complaint:   Right hand infection, dog bite related          History of Present Illness:   Crispin Rojas is a 64 year old male with PMH most notable for DVT on apixaban, CVA, HTN, HLD, DM2, PVD, GILA that presents with right hand pain and swelling.     The patient was petting a unknown dog that belonged to a neighbor 5 days prior to admission.  The patient suddenly moved and the dog bit him on his right hand.            Past Medical History:     Past Medical History:   Diagnosis Date    Antiplatelet or antithrombotic long-term use     Ascending aorta dilatation (H24)     Cerebral artery occlusion with cerebral infarction (H)     Cerebral infarction (H)     2010    Gastroesophageal reflux disease with esophagitis     H/O blood clots 2022    Hyperlipidemia LDL goal <70     Hypertension     Nonalcoholic steatohepatitis 11/30/2022    Numbness and tingling     Obesity      GILA (obstructive sleep apnea) 10/22/2018    CPAP intolerant    PVD (peripheral vascular disease) (H24)     s/p left partial toe amputation    Renal stone     Tremor     Type 2 diabetes mellitus with diabetic polyneuropathy, with long-term current use of insulin (H)              Past Surgical History:     Past Surgical History:   Procedure Laterality Date    AMPUTATE FOOT Left 8/18/2016    Procedure: AMPUTATE FOOT;  Surgeon: Jack Younger DPM;  Location: [facility]    AMPUTATE TOE(S) Right 2/1/2016    Procedure: AMPUTATE TOE(S);  Surgeon: Rachelle Manriquez DPM, Pod;  Location: [facility]    AMPUTATE TOE(S) Right 8/2/2019    Procedure: Right fourth toe partial amputation for treatment of osteomyelitis.;  Surgeon: Jack Younger DPM;  Location: [facility]    AMPUTATE TOE(S) Left 11/8/2019    Procedure: Left second toe amputation at the metatarsophalangeal joint.;  Surgeon: Rachelle Manriquez DPM, Podiatry/Foot and Ankle Surgery;  Location: [facility]    AMPUTATE TOE(S) Right 6/5/2022    Procedure: AMPUTATION OF RIGHT GREAT TOE;  Surgeon: Wili Quiles DPM;  Location: [facility]    AMPUTATE TOE(S) Right 7/28/2022    Procedure: Right foot partial first metatarsal resection;  Surgeon: Tiny Soto DPM;  Location: [facility]    AMPUTATE TOE(S) Left 6/18/2024    Procedure: Left foot transmetatarsal amputation;  Surgeon: Tiny Soto DPM;  Location: [facility]    ANGIOGRAM      BIOPSY BONE FOOT Right 7/24/2022    Procedure: Bone biopsy, right first metatarsal.;  Surgeon: Valentino Molina DPM;  Location: [facility]    COLONOSCOPY      COMBINED CYSTOSCOPY, RETROGRADES, URETEROSCOPY, INSERT STENT Left 8/21/2016    Procedure: COMBINED CYSTOSCOPY, RETROGRADES, URETEROSCOPY, INSERT STENT;  Surgeon: Artemio Valenzuela MD;  Location: [facility]    COMBINED CYSTOSCOPY, RETROGRADES, URETEROSCOPY, LASER HOLMIUM LITHOTRIPSY URETER(S), INSERT STENT Left 10/3/2016    Procedure: COMBINED CYSTOSCOPY, RETROGRADES, URETEROSCOPY, LASER HOLMIUM  LITHOTRIPSY URETER(S), INSERT STENT;  Surgeon: Artemio Valenzuela MD;  [facility]: [facility]    EXTRACORPOREAL SHOCK WAVE LITHOTRIPSY (ESWL) Left 9/8/2016    Procedure: EXTRACORPOREAL SHOCK WAVE LITHOTRIPSY (ESWL);  Surgeon: Artemio Valenzuela MD;  Location: [facility]    INCISION AND DRAINAGE FOOT, COMBINED Right 7/24/2022    Procedure: Incision and drainage, right foot ;  Surgeon: Valentino Molina DPM;  Location: [facility]    IRRIGATION AND DEBRIDEMENT FOOT, COMBINED Left 10/19/2023    Procedure: Left foot second metatarsal resection , . Left plantar ulcer excisional debridement, down to and including tendon, with closure, site measuring 4 cm x 2 cm x 1 cm;  Surgeon: Tiny Soto DPM;  Location: [facility]    OSTEOTOMY FOOT Right 11/30/2022    Procedure: 1. Right second metatarsal floating osteotomy  2. Right second digit proximal phalanx arthroplasty 3. Right percutaneous flexor tenotomy;  Surgeon: Tiny Soto DPM;  Location: [facility]    OSTEOTOMY FOOT Right 1/19/2023    Procedure: Right foot third metatarsal head excision, ulcer debridement, matatarsal osteotomies;  Surgeon: Tiny Soto DPM;  Location: [facility]    RECESSION GASTROCNEMIUS Right 2/1/2016    Procedure: RECESSION GASTROCNEMIUS;  Surgeon: Rachelle Manriquez DPM, Pod;  Location: [facility]             Social History:     Social History     Tobacco Use    Smoking status: Never    Smokeless tobacco: Never   Substance Use Topics    Alcohol use: Yes     Comment: rare---red wine 3x per month             Family History:     Family History   Problem Relation Age of Onset    Arthritis Mother         SLE    Connective Tissue Disorder Mother         lupus    Diabetes Mother     Cerebrovascular Disease Mother     Cancer Mother     Diabetes Father     Coronary Artery Disease Father     Chronic Obstructive Pulmonary Disease Father     Diabetes Sister     Diabetes Maternal Grandfather              Immunizations:     VACCINE/DOSE   Diptheria   DPT   DTAP   HBIG    Hepatitis A   Hepatitis B   HIB   Influenza   Measles   Meningococcal   MMR   Mumps   Pneumococcal   Polio   Rubella   Small Pox   TDAP   Varicella   Zoster             Allergies:     Allergies   Allergen Reactions    Statins Swelling     Other reaction(s): Other (see comments)  SIMCOR caused edema in extremities  Only Simvastatin    Bactrim [Sulfamethoxazole-Trimethoprim] Nausea and Vomiting    Sulfatolamide Nausea and Vomiting    Niacin Swelling     SIMCOR caused edema in extremities    Simvastatin Swelling     SIMCOR caused edema in extremities    Sulfa Antibiotics Nausea             Medications:     Current Facility-Administered Medications   Medication Dose Route Frequency Provider Last Rate Last Admin    acetaminophen (TYLENOL) tablet 975 mg  975 mg Oral Q6H PRN Wili Hunter MD   975 mg at 07/28/24 0904    Or    acetaminophen (TYLENOL) Suppository 650 mg  650 mg Rectal Q4H PRN Wili Hunter MD        amLODIPine (NORVASC) tablet 10 mg  10 mg Oral Daily Wili Hunter MD   10 mg at 07/28/24 1108    ampicillin-sulbactam (UNASYN) 3 g vial to attach to  mL bag  3 g Intravenous Q6H Wili Hunter MD   3 g at 07/28/24 1057    atorvastatin (LIPITOR) tablet 40 mg  40 mg Oral At Bedtime Wili Hunter MD        glucose gel 15-30 g  15-30 g Oral Q15 Min PRN Wili Hunter MD        Or    dextrose 50 % injection 25-50 mL  25-50 mL Intravenous Q15 Min PRN Wili Hunter MD        Or    glucagon injection 1 mg  1 mg Subcutaneous Q15 Min PRN Wili Hunter MD        gabapentin (NEURONTIN) capsule 400 mg  400 mg Oral TID Wili Hunter MD   400 mg at 07/28/24 1108    insulin aspart (NovoLOG) injection (RAPID ACTING)  1-7 Units Subcutaneous TID AC Wili Hunter MD        insulin aspart (NovoLOG) injection (RAPID ACTING)  1-5 Units Subcutaneous At Bedtime Wili Hunter MD        insulin degludec (TRESIBA) 100 UNIT/ML injection 35 Units  35  "Units Subcutaneous Paty Lombardo MD   35 Units at 07/28/24 1214    naloxone (NARCAN) injection 0.2 mg  0.2 mg Intravenous Q2 Min PRN Erwin Hernandez MD        Or    naloxone (NARCAN) injection 0.4 mg  0.4 mg Intravenous Q2 Min PRN Erwin Hernandez MD        Or    naloxone (NARCAN) injection 0.2 mg  0.2 mg Intramuscular Q2 Min PRN Erwin Hernandez MD        Or    naloxone (NARCAN) injection 0.4 mg  0.4 mg Intramuscular Q2 Min PRN Erwin Hernandez MD        ondansetron (ZOFRAN ODT) ODT tab 4 mg  4 mg Oral Q6H PRN Wili Hunter MD        Or    ondansetron (ZOFRAN) injection 4 mg  4 mg Intravenous Q6H PRN Wili Hunter MD        pantoprazole (PROTONIX) EC tablet 40 mg  40 mg Oral BID Wili Hunter MD   40 mg at 07/28/24 1108    Patient is already receiving anticoagulation with heparin, enoxaparin (LOVENOX), warfarin (COUMADIN)  or other anticoagulant medication   Does not apply Continuous PRN Wili Hunter MD        propranolol (INDERAL) tablet 20 mg  20 mg Oral BID Wili Hunter MD   20 mg at 07/28/24 1108    senna-docusate (SENOKOT-S/PERICOLACE) 8.6-50 MG per tablet 1 tablet  1 tablet Oral BID PRN Wili Hunter MD        Or    senna-docusate (SENOKOT-S/PERICOLACE) 8.6-50 MG per tablet 2 tablet  2 tablet Oral BID PRN Wili Hunter MD                 Review of Systems:   CV: NEGATIVE for chest pain, palpitations or peripheral edema  C: NEGATIVE for fever, chills, change in weight  E/M: NEGATIVE for ear, mouth and throat problems  R: NEGATIVE for significant cough or SOB          Physical Exam:   All vitals have been reviewed  Patient Vitals for the past 24 hrs:   BP Temp Temp src Pulse Resp SpO2 Height Weight   07/28/24 1253 132/70 98.2  F (36.8  C) Oral 74 18 96 % 1.803 m (5' 11\") 110.7 kg (244 lb)   07/28/24 1108 137/63 -- -- -- -- -- -- --   07/28/24 0900 (!) 148/72 99.6  F (37.6  C) Oral -- -- -- -- " "--   07/28/24 0700 (!) 154/75 -- -- 89 -- 93 % -- --   07/28/24 0645 -- -- -- -- -- 92 % -- --   07/28/24 0630 (!) 161/73 -- -- 86 -- 95 % -- --   07/28/24 0622 -- -- -- -- -- 91 % -- --   07/28/24 0621 -- -- -- -- -- 91 % -- --   07/28/24 0620 -- -- -- -- -- 91 % -- --   07/28/24 0619 -- -- -- -- -- 90 % -- --   07/28/24 0618 -- -- -- -- -- 90 % -- --   07/28/24 0617 (!) 160/76 -- -- 91 -- -- -- --   07/28/24 0549 -- -- -- -- -- 92 % -- --   07/28/24 0548 -- -- -- -- -- 91 % -- --   07/28/24 0547 -- -- -- -- -- 91 % -- --   07/28/24 0546 -- -- -- -- -- 91 % -- --   07/28/24 0531 (!) 151/78 -- -- 87 -- 94 % -- --   07/28/24 0521 -- -- -- -- -- 92 % -- --   07/28/24 0511 -- -- -- -- -- 93 % -- --   07/28/24 0501 (!) 155/82 -- -- -- -- -- -- --   07/28/24 0451 -- -- -- -- -- 94 % -- --   07/28/24 0441 (!) 156/79 -- -- 89 -- 95 % -- --   07/28/24 0412 (!) 184/86 99.4  F (37.4  C) -- 90 18 100 % -- --   07/28/24 0408 -- -- -- -- -- -- 1.803 m (5' 11\") 111.9 kg (246 lb 11.1 oz)     No intake or output data in the 24 hours ending 07/28/24 1322    Constitutional: Pleasant, alert, appropriate, following commands.  HEENT: Head atraumatic normocephalic. Pupils equal round and reactive.  Respiratory: Unlabored breathing no audible wheeze  Cardiovascular: Regular rate and rhythm per pulses.  GI: Abdomen is non-distended.  Lymph/Hematologic: No lymphangitis or streaking noted of right upper extremity   Genitourinary:  No dow  Musculoskeletal:   RUE:  Minimal erythema of the surrounding skin, global swelling. No discrete fusiform swelling of digits noted  Pain to palpation of the middle and ring finger MCP, does not extend to the distal phalanx   Tolerates resisted flexion of ring finger, weakness noted on resisted flexion of middle finger   Dog bite, healing of the dorsum of right hand. No expressible drainage or obvious abscess palpated although limited by level of pain  Right upper extremity is NVI.  Able to flex, extend, " abduct, and adduct digits.  Radial pulse palpable.  Digits are warm.  Capillary refill <2 seconds.  Sensation intact to light touch in the axillary, median, ulnar, and radial nerve distributions.    Neurologic: normal without focal findings, mental status, speech normal, alert and oriented x iii, CHELSEY,           Data:   All laboratory data reviewed  Results for orders placed or performed during the hospital encounter of 07/28/24   XR Hand Right G/E 3 Views     Status: None    Narrative    EXAM: XR HAND RIGHT G/E 3 VIEWS  LOCATION: [facility]  DATE: 7/28/2024    INDICATION: dog bite to the hand, rule out foreign body and fracture  COMPARISON: None.      Impression    IMPRESSION: Diffuse mild degenerative narrowing of interphalangeal in the third metacarpophalangeal joint spaces. Atherosclerotic calcification seen in the radial artery. No acute fracture or retained radiopaque foreign bodies. Mild soft tissue swelling   is seen in the hand.   Basic metabolic panel     Status: Abnormal   Result Value Ref Range    Sodium 137 135 - 145 mmol/L    Potassium 4.3 3.4 - 5.3 mmol/L    Chloride 102 98 - 107 mmol/L    Carbon Dioxide (CO2) 20 (L) 22 - 29 mmol/L    Anion Gap 15 7 - 15 mmol/L    Urea Nitrogen 25.2 (H) 8.0 - 23.0 mg/dL    Creatinine 1.17 0.67 - 1.17 mg/dL    GFR Estimate 70 >60 mL/min/1.73m2    Calcium 9.4 8.8 - 10.4 mg/dL    Glucose 98 70 - 99 mg/dL   Lactic acid whole blood with 1x repeat in 2 hr when >2     Status: Normal   Result Value Ref Range    Lactic Acid, Initial 1.7 0.7 - 2.0 mmol/L   CBC with platelets and differential     Status: Abnormal   Result Value Ref Range    WBC Count 9.0 4.0 - 11.0 10e3/uL    RBC Count 3.98 (L) 4.40 - 5.90 10e6/uL    Hemoglobin 11.4 (L) 13.3 - 17.7 g/dL    Hematocrit 35.3 (L) 40.0 - 53.0 %    MCV 89 78 - 100 fL    MCH 28.6 26.5 - 33.0 pg    MCHC 32.3 31.5 - 36.5 g/dL    RDW 14.3 10.0 - 15.0 %    Platelet Count 154 150 - 450 10e3/uL    % Neutrophils 74 %     % Lymphocytes 14 %    % Monocytes 9 %    % Eosinophils 3 %    % Basophils 0 %    % Immature Granulocytes 0 %    NRBCs per 100 WBC 0 <1 /100    Absolute Neutrophils 6.6 1.6 - 8.3 10e3/uL    Absolute Lymphocytes 1.3 0.8 - 5.3 10e3/uL    Absolute Monocytes 0.8 0.0 - 1.3 10e3/uL    Absolute Eosinophils 0.2 0.0 - 0.7 10e3/uL    Absolute Basophils 0.0 0.0 - 0.2 10e3/uL    Absolute Immature Granulocytes 0.0 <=0.4 10e3/uL    Absolute NRBCs 0.0 10e3/uL   CRP inflammation     Status: Abnormal   Result Value Ref Range    CRP Inflammation 20.66 (H) <5.00 mg/L   Glucose by meter     Status: Abnormal   Result Value Ref Range    GLUCOSE BY METER POCT 155 (H) 70 - 99 mg/dL   Glucose by meter     Status: Abnormal   Result Value Ref Range    GLUCOSE BY METER POCT 131 (H) 70 - 99 mg/dL   CBC with platelets differential     Status: Abnormal    Narrative    The following orders were created for panel order CBC with platelets differential.  Procedure                               Abnormality         Status                     ---------                               -----------         ------                     CBC with platelets and d...[[id number]]  Abnormal            Final result                 Please view results for these tests on the individual orders.          Attestation:  I have reviewed today's vital signs, notes, medications, labs and imaging with Dr. Mcdaniels.  Amount of time performed on this consult: 50 minutes.    Stephanie Rodarte PA-C  [facility]

## 2024-07-29 ENCOUNTER — APPOINTMENT (OUTPATIENT)
Dept: ULTRASOUND IMAGING | Facility: CLINIC | Age: 64
End: 2024-07-29
Attending: PHYSICIAN ASSISTANT
Payer: COMMERCIAL

## 2024-07-29 LAB
ABO/RH(D): NORMAL
ANION GAP SERPL CALCULATED.3IONS-SCNC: 10 MMOL/L (ref 7–15)
ANTIBODY SCREEN: NEGATIVE
BUN SERPL-MCNC: 17.1 MG/DL (ref 8–23)
CALCIUM SERPL-MCNC: 8.5 MG/DL (ref 8.8–10.4)
CHLORIDE SERPL-SCNC: 103 MMOL/L (ref 98–107)
CREAT SERPL-MCNC: 1.06 MG/DL (ref 0.67–1.17)
CRP SERPL-MCNC: 171.35 MG/L
EGFRCR SERPLBLD CKD-EPI 2021: 79 ML/MIN/1.73M2
ERYTHROCYTE [DISTWIDTH] IN BLOOD BY AUTOMATED COUNT: 14.3 % (ref 10–15)
GLUCOSE BLDC GLUCOMTR-MCNC: 122 MG/DL (ref 70–99)
GLUCOSE BLDC GLUCOMTR-MCNC: 162 MG/DL (ref 70–99)
GLUCOSE BLDC GLUCOMTR-MCNC: 185 MG/DL (ref 70–99)
GLUCOSE BLDC GLUCOMTR-MCNC: 210 MG/DL (ref 70–99)
GLUCOSE SERPL-MCNC: 186 MG/DL (ref 70–99)
HCO3 SERPL-SCNC: 23 MMOL/L (ref 22–29)
HCT VFR BLD AUTO: 33.2 % (ref 40–53)
HGB BLD-MCNC: 10.5 G/DL (ref 13.3–17.7)
MCH RBC QN AUTO: 27.9 PG (ref 26.5–33)
MCHC RBC AUTO-ENTMCNC: 31.6 G/DL (ref 31.5–36.5)
MCV RBC AUTO: 88 FL (ref 78–100)
PLATELET # BLD AUTO: 120 10E3/UL (ref 150–450)
POTASSIUM SERPL-SCNC: 4 MMOL/L (ref 3.4–5.3)
RBC # BLD AUTO: 3.77 10E6/UL (ref 4.4–5.9)
SODIUM SERPL-SCNC: 136 MMOL/L (ref 135–145)
SPECIMEN EXPIRATION DATE: NORMAL
WBC # BLD AUTO: 7.6 10E3/UL (ref 4–11)

## 2024-07-29 PROCEDURE — G0378 HOSPITAL OBSERVATION PER HR: HCPCS

## 2024-07-29 PROCEDURE — 250N000011 HC RX IP 250 OP 636: Performed by: HOSPITALIST

## 2024-07-29 PROCEDURE — 80048 BASIC METABOLIC PNL TOTAL CA: CPT | Performed by: INTERNAL MEDICINE

## 2024-07-29 PROCEDURE — 86140 C-REACTIVE PROTEIN: CPT | Performed by: PHYSICIAN ASSISTANT

## 2024-07-29 PROCEDURE — 250N000012 HC RX MED GY IP 250 OP 636 PS 637: Performed by: INTERNAL MEDICINE

## 2024-07-29 PROCEDURE — 250N000013 HC RX MED GY IP 250 OP 250 PS 637: Performed by: PHYSICIAN ASSISTANT

## 2024-07-29 PROCEDURE — 82962 GLUCOSE BLOOD TEST: CPT

## 2024-07-29 PROCEDURE — 36415 COLL VENOUS BLD VENIPUNCTURE: CPT | Performed by: PHYSICIAN ASSISTANT

## 2024-07-29 PROCEDURE — 99232 SBSQ HOSP IP/OBS MODERATE 35: CPT | Performed by: INTERNAL MEDICINE

## 2024-07-29 PROCEDURE — 86900 BLOOD TYPING SEROLOGIC ABO: CPT | Performed by: PHYSICIAN ASSISTANT

## 2024-07-29 PROCEDURE — 250N000013 HC RX MED GY IP 250 OP 250 PS 637: Performed by: INTERNAL MEDICINE

## 2024-07-29 PROCEDURE — 96372 THER/PROPH/DIAG INJ SC/IM: CPT | Performed by: HOSPITALIST

## 2024-07-29 PROCEDURE — 76882 US LMTD JT/FCL EVL NVASC XTR: CPT | Mod: RT

## 2024-07-29 PROCEDURE — 250N000011 HC RX IP 250 OP 636: Performed by: INTERNAL MEDICINE

## 2024-07-29 PROCEDURE — 36415 COLL VENOUS BLD VENIPUNCTURE: CPT | Performed by: INTERNAL MEDICINE

## 2024-07-29 PROCEDURE — 85027 COMPLETE CBC AUTOMATED: CPT | Performed by: INTERNAL MEDICINE

## 2024-07-29 RX ORDER — CHLORHEXIDINE GLUCONATE 40 MG/ML
SOLUTION TOPICAL DAILY
Status: DISCONTINUED | OUTPATIENT
Start: 2024-07-29 | End: 2024-08-02 | Stop reason: HOSPADM

## 2024-07-29 RX ADMIN — PANTOPRAZOLE SODIUM 40 MG: 40 TABLET, DELAYED RELEASE ORAL at 20:40

## 2024-07-29 RX ADMIN — AMPICILLIN SODIUM AND SULBACTAM SODIUM 3 G: 2; 1 INJECTION, POWDER, FOR SOLUTION INTRAMUSCULAR; INTRAVENOUS at 04:59

## 2024-07-29 RX ADMIN — ACETAMINOPHEN 975 MG: 325 TABLET, FILM COATED ORAL at 04:57

## 2024-07-29 RX ADMIN — PROPRANOLOL HYDROCHLORIDE 20 MG: 20 TABLET ORAL at 09:26

## 2024-07-29 RX ADMIN — PANTOPRAZOLE SODIUM 40 MG: 40 TABLET, DELAYED RELEASE ORAL at 09:26

## 2024-07-29 RX ADMIN — GABAPENTIN 400 MG: 400 CAPSULE ORAL at 09:26

## 2024-07-29 RX ADMIN — ENOXAPARIN SODIUM 105 MG: 120 INJECTION SUBCUTANEOUS at 04:58

## 2024-07-29 RX ADMIN — CHLORHEXIDINE GLUCONATE 4%: 4 LIQUID TOPICAL at 11:10

## 2024-07-29 RX ADMIN — ATORVASTATIN CALCIUM 40 MG: 20 TABLET, FILM COATED ORAL at 20:48

## 2024-07-29 RX ADMIN — PROPRANOLOL HYDROCHLORIDE 20 MG: 20 TABLET ORAL at 20:40

## 2024-07-29 RX ADMIN — ACETAMINOPHEN 975 MG: 325 TABLET, FILM COATED ORAL at 16:44

## 2024-07-29 RX ADMIN — AMPICILLIN SODIUM AND SULBACTAM SODIUM 3 G: 2; 1 INJECTION, POWDER, FOR SOLUTION INTRAMUSCULAR; INTRAVENOUS at 11:01

## 2024-07-29 RX ADMIN — GABAPENTIN 400 MG: 400 CAPSULE ORAL at 14:41

## 2024-07-29 RX ADMIN — AMPICILLIN SODIUM AND SULBACTAM SODIUM 3 G: 2; 1 INJECTION, POWDER, FOR SOLUTION INTRAMUSCULAR; INTRAVENOUS at 16:45

## 2024-07-29 RX ADMIN — GABAPENTIN 400 MG: 400 CAPSULE ORAL at 20:40

## 2024-07-29 RX ADMIN — INSULIN DEGLUDEC 35 UNITS: 100 INJECTION, SOLUTION SUBCUTANEOUS at 10:53

## 2024-07-29 RX ADMIN — ENOXAPARIN SODIUM 105 MG: 120 INJECTION SUBCUTANEOUS at 16:46

## 2024-07-29 RX ADMIN — AMLODIPINE BESYLATE 10 MG: 10 TABLET ORAL at 09:26

## 2024-07-29 RX ADMIN — AMPICILLIN SODIUM AND SULBACTAM SODIUM 3 G: 2; 1 INJECTION, POWDER, FOR SOLUTION INTRAMUSCULAR; INTRAVENOUS at 22:16

## 2024-07-29 RX ADMIN — ACETAMINOPHEN 975 MG: 325 TABLET, FILM COATED ORAL at 23:14

## 2024-07-29 ASSESSMENT — ACTIVITIES OF DAILY LIVING (ADL)
ADLS_ACUITY_SCORE: 20

## 2024-07-29 NOTE — PLAN OF CARE
Goal Outcome Evaluation:    PRIMARY DIAGNOSIS: RUE Dog Bite/Cellulitis  OUTPATIENT/OBSERVATION GOALS TO BE MET BEFORE DISCHARGE:  Vitals sign stable or return to baseline: Yes    Tolerating oral antibiotics or has home infusion set up if applicable: Yes    Pain status: Improved-controlled with oral pain medications.    Return to near baseline physical activity: Yes    Discharge Planner Nurse   Safe discharge environment identified: Yes  Barriers to discharge: Yes       Entered by: Lakshmi Vazquez RN 07/29/2024 6:27 PM   Pt A/O x 4.  VSS and afebrile.  PRN tylenol for pain.  R. Hand swelling extending to upper arm. Hibiclens soak in completed. Arm elevated on pillow. Up Ad Zoey. Voiding adequately.  BS checks and carb count. Tolerating PO diet.  Plan is RUE ultrasound done. Awaiting MRI   Please review provider order for any additional goals.     Nurse to notify provider when observation goals have been met and patient is ready for discharge.

## 2024-07-29 NOTE — PLAN OF CARE
"Goal Outcome Evaluation:      Plan of Care Reviewed With: patient    Overall Patient Progress: no changeOverall Patient Progress: no change  Orientation: Alert and oriented x4  VSS. Afebrile. On RA.   LS: Clear bilaterally.   GI: BS normoactive.  Passing gas. No BM. Denies N/V. Tolerating regular diet.   : Adequate urine output.   Skin: Right hand red, swollen and warm to touch. Right arm elevated on pillows.   PIV: SL in between abx.  Activity: Independent. Pt slept comfortably throughout shift.   Pain: Pt rated hand pain 4-6/10. PRN tylenol x1 given.  Updates/Plan: Continue with current cares.    Problem: Adult Inpatient Plan of Care  Goal: Plan of Care Review  Description: The Plan of Care Review/Shift note should be completed every shift.  The Outcome Evaluation is a brief statement about your assessment that the patient is improving, declining, or no change.  This information will be displayed automatically on your shift  note.  Outcome: Progressing  Flowsheets (Taken 7/29/2024 0520)  Plan of Care Reviewed With: patient  Overall Patient Progress: no change  Goal: Patient-Specific Goal (Individualized)  Description: You can add care plan individualizations to a care plan. Examples of Individualization might be:  \"Parent requests to be called daily at 9am for status\", \"I have a hard time hearing out of my right ear\", or \"Do not touch me to wake me up as it startles  me\".  Outcome: Progressing  Goal: Absence of Hospital-Acquired Illness or Injury  Outcome: Progressing  Intervention: Identify and Manage Fall Risk  Recent Flowsheet Documentation  Taken 7/28/2024 2000 by Capo Rodgers RN  Safety Promotion/Fall Prevention:   activity supervised   assistive device/personal items within reach   clutter free environment maintained   nonskid shoes/slippers when out of bed   room organization consistent   room near nurse's station   safety round/check completed   treat underlying cause   patient and family " education  Intervention: Prevent Skin Injury  Recent Flowsheet Documentation  Taken 7/28/2024 2000 by Capo Rodgers RN  Body Position: position changed independently  Skin Protection: adhesive use limited  Device Skin Pressure Protection:   tubing/devices free from skin contact   adhesive use limited  Intervention: Prevent Infection  Recent Flowsheet Documentation  Taken 7/28/2024 2000 by Capo Rodgers RN  Infection Prevention:   rest/sleep promoted   hand hygiene promoted   single patient room provided   environmental surveillance performed  Goal: Optimal Comfort and Wellbeing  Outcome: Progressing  Intervention: Monitor Pain and Promote Comfort  Recent Flowsheet Documentation  Taken 7/29/2024 0456 by Capo Rodgers RN  Pain Management Interventions: medication (see MAR)  Taken 7/28/2024 2350 by Capo Rodgers RN  Pain Management Interventions: rest  Taken 7/28/2024 2000 by Cpao Rodgers RN  Pain Management Interventions: medication (see MAR)  Goal: Readiness for Transition of Care  Outcome: Progressing

## 2024-07-29 NOTE — PLAN OF CARE
Goal Outcome Evaluation:    PRIMARY DIAGNOSIS: RUE Dog Bite/Cellulitis  OUTPATIENT/OBSERVATION GOALS TO BE MET BEFORE DISCHARGE:  Vitals sign stable or return to baseline: Yes    Tolerating oral antibiotics or has home infusion set up if applicable: Yes    Pain status: Improved-controlled with oral pain medications.    Return to near baseline physical activity: Yes    Discharge Planner Nurse   Safe discharge environment identified: Yes  Barriers to discharge: Yes       Entered by: Lakshmi Vazquez RN 07/29/2024 2:36 PM   Pt A/O x 4.  VSS and afebrile.  PRN tylenol for pain.  R. Hand swelling extending to upper arm. Hibiclens soak in completed. Arm elevated on pillow. Up Ad Zoey. Voiding adequately.  BS checks and carb count. Tolerating PO diet.  Plan is RUE ultrasound done.  Please review provider order for any additional goals.     Nurse to notify provider when observation goals have been met and patient is ready for discharge.

## 2024-07-29 NOTE — PLAN OF CARE
"Goal Outcome Evaluation:      Plan of Care Reviewed With: patient    Overall Patient Progress: no changeOverall Patient Progress: no change    Outcome Evaluation: Pt A/O x 4. VSS and afebrile. PRN tylenol for pain.  R. Hand swelling extending to upper arm.  Soak in hibiclen. Arm elevated on pillow. Up Ad Zoey. Voiding adequately.  BS checks and carb count. Tolerating PO diet.  Plan is RUE ultrasound done. Awaiting MRI; checklist completed.    Problem: Adult Inpatient Plan of Care  Goal: Plan of Care Review  Description: The Plan of Care Review/Shift note should be completed every shift.  The Outcome Evaluation is a brief statement about your assessment that the patient is improving, declining, or no change.  This information will be displayed automatically on your shift  note.  7/29/2024 1447 by Lakshmi Vazquez RN  Outcome: Progressing  Flowsheets (Taken 7/29/2024 1447)  Plan of Care Reviewed With: patient  Overall Patient Progress: no change  7/29/2024 1446 by Lakshmi Vazquez RN  Outcome: Progressing  Flowsheets (Taken 7/29/2024 1446)  Plan of Care Reviewed With: patient  Overall Patient Progress: improving  7/29/2024 1445 by Lakshmi Vazquez RN  Outcome: Progressing  Flowsheets (Taken 7/29/2024 1445)  Outcome Evaluation:   Pt A/O x 4. VSS and afebrile. PRN tylenol for pain.  R. Hand swelling extending to upper arm.  Soak in hibiclen. Arm elevated on pillow. Up Ad Zoey. Voiding adequately.  BS checks and carb count. Tolerating PO diet.  Plan is RUE ultrasound done. Awaiting MRI   checklist completed.  Plan of Care Reviewed With: patient  Overall Patient Progress: no change  Goal: Patient-Specific Goal (Individualized)  Description: You can add care plan individualizations to a care plan. Examples of Individualization might be:  \"Parent requests to be called daily at 9am for status\", \"I have a hard time hearing out of my right ear\", or \"Do not touch me to wake me up as it startles  me\".  7/29/2024 1447 by Lakshmi Vazquez, " RN  Outcome: Progressing  7/29/2024 1446 by Lakshmi Vazquez RN  Outcome: Progressing  7/29/2024 1445 by Lakshmi Vazquez RN  Outcome: Progressing  Goal: Absence of Hospital-Acquired Illness or Injury  7/29/2024 1447 by Lakshmi Vazquez RN  Outcome: Progressing  7/29/2024 1446 by Lakshmi Vazquez RN  Outcome: Progressing  7/29/2024 1445 by Lakshmi Vazquez RN  Outcome: Progressing  Intervention: Identify and Manage Fall Risk  Recent Flowsheet Documentation  Taken 7/29/2024 0926 by Lakshmi Vazquez RN  Safety Promotion/Fall Prevention:   clutter free environment maintained   nonskid shoes/slippers when out of bed   safety round/check completed   treat underlying cause   patient and family education  Intervention: Prevent Skin Injury  Recent Flowsheet Documentation  Taken 7/29/2024 0926 by Lakshmi Vazquez RN  Body Position: position changed independently  Device Skin Pressure Protection:   tubing/devices free from skin contact   adhesive use limited  Goal: Optimal Comfort and Wellbeing  7/29/2024 1447 by Lakshmi Vazquez RN  Outcome: Progressing  7/29/2024 1446 by Lakshmi Vazquez RN  Outcome: Progressing  7/29/2024 1445 by Lakshmi Vazquez RN  Outcome: Progressing  Intervention: Monitor Pain and Promote Comfort  Recent Flowsheet Documentation  Taken 7/29/2024 0926 by Laskhmi Vazquez RN  Pain Management Interventions: medication (see MAR)  Goal: Readiness for Transition of Care  7/29/2024 1447 by Lakshmi Vazquez RN  Outcome: Progressing  7/29/2024 1446 by Lakshmi Vazquez RN  Outcome: Progressing  7/29/2024 1445 by Lakshmi Vazquez RN  Outcome: Progressing     Problem: Comorbidity Management  Goal: Blood Glucose Levels Within Targeted Range  7/29/2024 1447 by Lakshmi Vazquez RN  Outcome: Progressing  7/29/2024 1446 by Lakshmi Vazquez RN  Outcome: Progressing  7/29/2024 1445 by Lakshmi Vazquez RN  Outcome: Progressing  Intervention: Monitor and Manage Glycemia  Recent Flowsheet Documentation  Taken 7/29/2024 0926 by Lakshmi Vazquez RN  Medication  Review/Management: medications reviewed  Goal: Blood Pressure in Desired Range  7/29/2024 1447 by Lakshmi Vazquez, RN  Outcome: Progressing  7/29/2024 1446 by Lakshmi Vazquez RN  Outcome: Progressing  7/29/2024 1445 by Lakshmi Vazquez RN  Outcome: Progressing  Intervention: Maintain Blood Pressure Management  Recent Flowsheet Documentation  Taken 7/29/2024 0926 by Lakshmi Vazquez, GINGER  Medication Review/Management: medications reviewed     Problem: Skin or Soft Tissue Infection  Goal: Absence of Infection Signs and Symptoms  7/29/2024 1447 by Lakshmi Vazquez RN  Outcome: Progressing  7/29/2024 1446 by Lakshmi Vazquez RN  Outcome: Progressing  7/29/2024 1445 by Lakshmi Vazquez RN  Outcome: Progressing  Intervention: Provide Meticulous Infection Site Care  Recent Flowsheet Documentation  Taken 7/29/2024 0926 by Lakshmi Vazquez, GINGER  Topical Inflammation Care:   border marked   border expansion noted

## 2024-07-29 NOTE — PROGRESS NOTES
"Orthopedic Surgery  Crispin Rojas  07/29/2024     Admit Date:  7/28/2024    Right dorsal hand cellulitis with possible underlying fluid collection, s/p dog bite    Patient resting comfortably in bed.    Initially reports no improvement to his right hand symptoms. Later in the encounter, patient states that his right hand pain, swelling, and redness are \"definitely improved.\"  Swelling is still present, but is reportedly much better compared to yesterday.  Pain is mostly over the dorsal aspect of the right hand, but does note tenderness to the volar wrist as well.   Endorses slight tingling in all fingers.  Tolerating oral intake.    Denies nausea or vomiting.  Denies chest pain or shortness of breath.  Denies subjective fever or chills currently, but reports chills overnight.  NAEO.    Temp:  [97.8  F (36.6  C)-98.9  F (37.2  C)] 98.9  F (37.2  C)  Pulse:  [66-89] 69  Resp:  [18] 18  BP: (119-140)/(52-76) 120/71  SpO2:  [94 %-99 %] 94 %    Alert and oriented. NAD. Non-toxic appearing. Breathing comfortably ORA.  Right dorsal hand puncture ellen from previous dog bite. Scant eschar to puncture site. No active or expressible drainage. No apparent fluid collection palpated superficially.  Moderate diffuse edema with pitting to the right dorsal hand and extending into all digits.   No significant erythema. No ecchymosis.  Right upper extremity is NVI.  Able to flex, extend, abduct, and adduct digits but with significant limitation due to swelling.  Able to actively flex and extend the wrist.  Radial pulse palpable.  Digits are warm.  Capillary refill <2 seconds.  Sensation intact to light touch in the axillary, median, ulnar, and radial nerve distributions.    Labs/Imaging:  Recent Labs   Lab Test 07/29/24  0652 07/28/24  0501 07/05/24  1157   WBC 7.6 9.0 6.3   HGB 10.5* 11.4* 11.5*   * 154 194     Recent Labs   Lab Test 11/07/19  1817   INR 1.08     Recent Labs   Lab Test 07/29/24  0652 07/28/24  1400 " 07/28/24  0501   CRPI 171.35* 62.34* 20.66*     Right hand nonvascular ultrasound dated 7/29/24:  IMPRESSION: Note, the exam was performed by an ultrasound technologist only, a remote distance from where the images are interpreted.      The images submitted demonstrate infiltrative fluid and increased echogenicity in the intervening soft tissues in the dorsum of the imaged right hand. The ultrasound technologist reports a complexed mixed echogenicity area measuring 2.4 x 1.8 x 0.5 cm, uncertain as to where exactly this is but appears to be superficial to the imaged tendons presumably extensor tendons.      Right hand MRI without and with intravenous contrast is recommend for  full assessment.    A/P    Right dorsal hand cellulitis with possible underlying fluid collection/abscess, s/p dog bite  -WBC remains WNL, CRP significantly elevated from yesterday (62.34 -> 171.35). Afebrile, VSS. Provided photographs and exam information to on-call orthopedic surgeon, Dr. Catrina Mcdaniels, who recommends an ultrasound. This was completed today as above. There is concern for a small fluid collection, thus, we will proceed with a right hand MRI with and without contrast today. As the patient is stable and needs further work-up, there are no plans for surgery today. Will make the patient NPO at midnight pending MRI findings and repeat exam tomorrow. Okay for diet today.  -Continue IV Unasyn per primary/ID.  -Initiate once daily right hand Hibiclens/warm water soaks x 20 minutes to hopefully draw out some fluid. Orders placed.  -Encourage consistent elevation of the right hand.   -Okay for activity as tolerated of the right hand.  -Okay to continue with Lovenox at this time.  -Ortho team will check in again tomorrow.       2.  Disposition  -Continue cares.    Maylin Dixon PA-C  [facility]

## 2024-07-29 NOTE — PROGRESS NOTES
Hospitalist Medicine Progress Note   [facility] [place]       Crispin Rojas is a 64 year old gentleman with DVT on apixaban, CVA, essential hypertension, hypercholesteremia, T2DM, peripheral vascular disease, obstructive sleep apnea came to [facility] 7/28/2024 after a dog bite 5 days prior to admission while petting an unknown dog that belonged to his neighbor and having swelling and pain of the right hand unable to make a fist due to pain, fevers WBC of 9.0 was started on Unasyn 7/28/2024 was evaluated by orthopedic surgery who did not think that the patient has tenosynovitis and to continue the antibiotic management with elevation of the right hand.  Splint was not thought to be needed       Date of Admission:  7/28/2024  Assessment & Plan     Right hand dog bite  Right hand cellulitis  Patient came in with right hand pain, swelling, fevers after dog bite 5-day prior to admission from an unknown dog in the neighborhood  Patient's CRP increased from 20 to 171 on Unasyn though the white count has somewhat decreased from 9 to 7.6  Vaccination was given 2022  There is no growth in the blood culture drawn 7/28/2024  Ultrasound right hand was ordered by orthopedic surgery 7/29/2024 and patient states that orthopedic surgery said that should there be any abscess this will be drained    DM2  -Continue degludec 35 units daily  -Medium intensity sliding scale insulin -she does not want to take this but rather want to take what he takes at home 1 unit of short acting insulin for 3 g of carbs in other words 5 units of insulin for 15 grams of carbohydrate  -Hold metformin  -pt is also on scheduled aspart 9 units/gm carb PTA     Recurrent DVT  -Holding apixaban until orthopedics eval  -Last took 0100 on 07/28     History of CVA  -Continue statin  -Restart apixaban when able     HTN  -Continue amlodipine, propranolol  -Hold HCTZ and lisinopril     HLD  -Continue atorvastatin 40 mg daily      Pain/neuropathy   -continue gabapentin     Obesity  -Will require extra nursing resources routine cares          Plan:   Ultrasound right hand was ordered by orthopedic surgery 7/29/2024      Diet: Moderate Consistent Carb (60 g CHO per Meal) Diet  NPO per Anesthesia Guidelines for Procedure/Surgery Except for: Meds, Ice Chips    DVT Prophylaxis: Enoxaparin (Lovenox) SQ  Mccarthy Catheter: Not present  Code Status: Full Code               The patient's care was discussed with the Patient and RN.    Uli Drummond MD  Hospitalist Service  [facility]    ______________________________________________________________________    Interval History     Symptoms   Patient's right hand is less painful in the morning today than before    Review of Systems:   Denies any nausea vomiting or fever    Data reviewed today: I reviewed all medications, new labs and imaging results over the last 24 hours.     Physical Exam   Vital Signs: Temp: 98.8  F (37.1  C) Temp src: Oral BP: 119/52 Pulse: 71   Resp: 18 SpO2: 96 % O2 Device: None (Room air)    Weight: 244 lbs 0 oz      GENERAL: Patient is not in acute distress  HEENT: EOM+,Conjunctiva is clear   NECK:  no Jugular Venous distention  HEART: S1 S2 regular Rate and Rhythm, there is  no murmur,   LUNGS: Respirations are  not laboured, Lungs are  clear to auscultation without Crepitations or Wheezing   ABDOMEN: Soft , there is no tenderness , Bowel Sounds are  Positive   LOWER LIMBS: no  Pedal Edema  Bilaterally   CNS:  Alert,  Oriented x 3, Moving all the Four Limbs   RIGHT UPPER LIMB : Right hand is swollen and somewhat fluctuant on the dorsum with the bite ellen    Data   Recent Labs   Lab 07/29/24  0652 07/28/24  2158 07/28/24  1731 07/28/24  0911 07/28/24  0501   WBC 7.6  --   --   --  9.0   HGB 10.5*  --   --   --  11.4*   MCV 88  --   --   --  89   *  --   --   --  154     --   --   --  137   POTASSIUM 4.0  --   --   --  4.3   CHLORIDE 103  --   --    --  102   CO2 23  --   --   --  20*   BUN 17.1  --   --   --  25.2*   CR 1.06  --   --   --  1.17   ANIONGAP 10  --   --   --  15   NRAA 8.5*  --   --   --  9.4   * 243* 166*   < > 98    < > = values in this interval not displayed.         Recent Results (from the past 24 hour(s))   US Lower Extremity Venous Duplex Left    Narrative    EXAM: US LOWER EXTREMITY VENOUS DUPLEX LEFT  LOCATION: [facility]  DATE: 7/28/2024    INDICATION: swlling lower ext  COMPARISON: 6/17/2024  TECHNIQUE: Venous Duplex ultrasound of the left lower extremity with and without compression, augmentation and duplex. Color flow and spectral Doppler with waveform analysis performed.    FINDINGS: Exam includes the common femoral, femoral, popliteal, and contralateral common femoral veins as well as segmentally visualized deep calf veins and greater saphenous vein.     LEFT: No deep vein thrombosis. No superficial thrombophlebitis. No popliteal cyst.      Impression    IMPRESSION:  No deep venous thrombosis in the left lower extremity.

## 2024-07-30 ENCOUNTER — TELEPHONE (OUTPATIENT)
Dept: FAMILY MEDICINE | Facility: CLINIC | Age: 64
End: 2024-07-30
Payer: COMMERCIAL

## 2024-07-30 ENCOUNTER — APPOINTMENT (OUTPATIENT)
Dept: MRI IMAGING | Facility: CLINIC | Age: 64
End: 2024-07-30
Attending: PHYSICIAN ASSISTANT
Payer: COMMERCIAL

## 2024-07-30 ENCOUNTER — ANESTHESIA (OUTPATIENT)
Dept: SURGERY | Facility: CLINIC | Age: 64
End: 2024-07-30
Payer: COMMERCIAL

## 2024-07-30 ENCOUNTER — TELEPHONE (OUTPATIENT)
Dept: PODIATRY | Facility: CLINIC | Age: 64
End: 2024-07-30
Payer: COMMERCIAL

## 2024-07-30 ENCOUNTER — ANESTHESIA EVENT (OUTPATIENT)
Dept: SURGERY | Facility: CLINIC | Age: 64
End: 2024-07-30
Payer: COMMERCIAL

## 2024-07-30 LAB
BACTERIA SPEC CULT: ABNORMAL
CRP SERPL-MCNC: 170.45 MG/L
GLUCOSE BLDC GLUCOMTR-MCNC: 104 MG/DL (ref 70–99)
GLUCOSE BLDC GLUCOMTR-MCNC: 104 MG/DL (ref 70–99)
GLUCOSE BLDC GLUCOMTR-MCNC: 111 MG/DL (ref 70–99)
GLUCOSE BLDC GLUCOMTR-MCNC: 146 MG/DL (ref 70–99)
GLUCOSE BLDC GLUCOMTR-MCNC: 257 MG/DL (ref 70–99)
GLUCOSE BLDC GLUCOMTR-MCNC: 96 MG/DL (ref 70–99)
GLUCOSE SERPL-MCNC: 151 MG/DL (ref 70–99)
GRAM STAIN RESULT: ABNORMAL
GRAM STAIN RESULT: ABNORMAL

## 2024-07-30 PROCEDURE — 272N000001 HC OR GENERAL SUPPLY STERILE: Performed by: ORTHOPAEDIC SURGERY

## 2024-07-30 PROCEDURE — 96372 THER/PROPH/DIAG INJ SC/IM: CPT | Performed by: HOSPITALIST

## 2024-07-30 PROCEDURE — 0HDFXZZ EXTRACTION OF RIGHT HAND SKIN, EXTERNAL APPROACH: ICD-10-PCS | Performed by: ORTHOPAEDIC SURGERY

## 2024-07-30 PROCEDURE — 73220 MRI UPPR EXTREMITY W/O&W/DYE: CPT | Mod: RT

## 2024-07-30 PROCEDURE — 250N000013 HC RX MED GY IP 250 OP 250 PS 637: Performed by: INTERNAL MEDICINE

## 2024-07-30 PROCEDURE — 710N000009 HC RECOVERY PHASE 1, LEVEL 1, PER MIN: Performed by: ORTHOPAEDIC SURGERY

## 2024-07-30 PROCEDURE — G0378 HOSPITAL OBSERVATION PER HR: HCPCS

## 2024-07-30 PROCEDURE — 258N000003 HC RX IP 258 OP 636: Performed by: PHYSICIAN ASSISTANT

## 2024-07-30 PROCEDURE — 258N000003 HC RX IP 258 OP 636: Performed by: NURSE ANESTHETIST, CERTIFIED REGISTERED

## 2024-07-30 PROCEDURE — 120N000004 HC R&B MS OVERFLOW

## 2024-07-30 PROCEDURE — 250N000011 HC RX IP 250 OP 636: Performed by: HOSPITALIST

## 2024-07-30 PROCEDURE — 255N000002 HC RX 255 OP 636: Performed by: INTERNAL MEDICINE

## 2024-07-30 PROCEDURE — 82962 GLUCOSE BLOOD TEST: CPT

## 2024-07-30 PROCEDURE — 999N000141 HC STATISTIC PRE-PROCEDURE NURSING ASSESSMENT: Performed by: ORTHOPAEDIC SURGERY

## 2024-07-30 PROCEDURE — 250N000011 HC RX IP 250 OP 636: Performed by: NURSE ANESTHETIST, CERTIFIED REGISTERED

## 2024-07-30 PROCEDURE — 0JBJ0ZZ EXCISION OF RIGHT HAND SUBCUTANEOUS TISSUE AND FASCIA, OPEN APPROACH: ICD-10-PCS | Performed by: ORTHOPAEDIC SURGERY

## 2024-07-30 PROCEDURE — 250N000011 HC RX IP 250 OP 636: Performed by: ORTHOPAEDIC SURGERY

## 2024-07-30 PROCEDURE — A9585 GADOBUTROL INJECTION: HCPCS | Performed by: INTERNAL MEDICINE

## 2024-07-30 PROCEDURE — 250N000009 HC RX 250: Performed by: NURSE ANESTHETIST, CERTIFIED REGISTERED

## 2024-07-30 PROCEDURE — 82947 ASSAY GLUCOSE BLOOD QUANT: CPT | Performed by: INTERNAL MEDICINE

## 2024-07-30 PROCEDURE — 250N000009 HC RX 250: Performed by: ORTHOPAEDIC SURGERY

## 2024-07-30 PROCEDURE — 250N000011 HC RX IP 250 OP 636: Performed by: ANESTHESIOLOGY

## 2024-07-30 PROCEDURE — 87205 SMEAR GRAM STAIN: CPT | Performed by: ORTHOPAEDIC SURGERY

## 2024-07-30 PROCEDURE — 258N000003 HC RX IP 258 OP 636: Performed by: ANESTHESIOLOGY

## 2024-07-30 PROCEDURE — 99233 SBSQ HOSP IP/OBS HIGH 50: CPT | Performed by: STUDENT IN AN ORGANIZED HEALTH CARE EDUCATION/TRAINING PROGRAM

## 2024-07-30 PROCEDURE — 87075 CULTR BACTERIA EXCEPT BLOOD: CPT | Performed by: ORTHOPAEDIC SURGERY

## 2024-07-30 PROCEDURE — 36415 COLL VENOUS BLD VENIPUNCTURE: CPT | Performed by: PHYSICIAN ASSISTANT

## 2024-07-30 PROCEDURE — 250N000011 HC RX IP 250 OP 636: Performed by: PHYSICIAN ASSISTANT

## 2024-07-30 PROCEDURE — 250N000025 HC SEVOFLURANE, PER MIN: Performed by: ORTHOPAEDIC SURGERY

## 2024-07-30 PROCEDURE — 250N000013 HC RX MED GY IP 250 OP 250 PS 637: Performed by: PHYSICIAN ASSISTANT

## 2024-07-30 PROCEDURE — 370N000017 HC ANESTHESIA TECHNICAL FEE, PER MIN: Performed by: ORTHOPAEDIC SURGERY

## 2024-07-30 PROCEDURE — 360N000083 HC SURGERY LEVEL 3 W/ FLUORO, PER MIN: Performed by: ORTHOPAEDIC SURGERY

## 2024-07-30 PROCEDURE — 87070 CULTURE OTHR SPECIMN AEROBIC: CPT | Performed by: ORTHOPAEDIC SURGERY

## 2024-07-30 PROCEDURE — 250N000011 HC RX IP 250 OP 636: Performed by: INTERNAL MEDICINE

## 2024-07-30 PROCEDURE — 86140 C-REACTIVE PROTEIN: CPT | Performed by: PHYSICIAN ASSISTANT

## 2024-07-30 RX ORDER — ONDANSETRON 4 MG/1
4 TABLET, ORALLY DISINTEGRATING ORAL EVERY 30 MIN PRN
Status: DISCONTINUED | OUTPATIENT
Start: 2024-07-30 | End: 2024-07-30 | Stop reason: HOSPADM

## 2024-07-30 RX ORDER — ONDANSETRON 4 MG/1
4 TABLET, ORALLY DISINTEGRATING ORAL EVERY 6 HOURS PRN
Status: CANCELLED | OUTPATIENT
Start: 2024-07-30

## 2024-07-30 RX ORDER — HYDROMORPHONE HCL IN WATER/PF 6 MG/30 ML
0.4 PATIENT CONTROLLED ANALGESIA SYRINGE INTRAVENOUS EVERY 5 MIN PRN
Status: DISCONTINUED | OUTPATIENT
Start: 2024-07-30 | End: 2024-07-30 | Stop reason: HOSPADM

## 2024-07-30 RX ORDER — FENTANYL CITRATE 50 UG/ML
50 INJECTION, SOLUTION INTRAMUSCULAR; INTRAVENOUS EVERY 5 MIN PRN
Status: DISCONTINUED | OUTPATIENT
Start: 2024-07-30 | End: 2024-07-30 | Stop reason: HOSPADM

## 2024-07-30 RX ORDER — NALOXONE HYDROCHLORIDE 0.4 MG/ML
0.1 INJECTION, SOLUTION INTRAMUSCULAR; INTRAVENOUS; SUBCUTANEOUS
Status: DISCONTINUED | OUTPATIENT
Start: 2024-07-30 | End: 2024-07-30 | Stop reason: HOSPADM

## 2024-07-30 RX ORDER — SODIUM CHLORIDE, SODIUM LACTATE, POTASSIUM CHLORIDE, CALCIUM CHLORIDE 600; 310; 30; 20 MG/100ML; MG/100ML; MG/100ML; MG/100ML
INJECTION, SOLUTION INTRAVENOUS CONTINUOUS
Status: DISCONTINUED | OUTPATIENT
Start: 2024-07-30 | End: 2024-07-30 | Stop reason: HOSPADM

## 2024-07-30 RX ORDER — OXYCODONE HYDROCHLORIDE 5 MG/1
10 TABLET ORAL EVERY 4 HOURS PRN
Status: DISCONTINUED | OUTPATIENT
Start: 2024-07-30 | End: 2024-08-01

## 2024-07-30 RX ORDER — LIDOCAINE HYDROCHLORIDE 20 MG/ML
INJECTION, SOLUTION INFILTRATION; PERINEURAL PRN
Status: DISCONTINUED | OUTPATIENT
Start: 2024-07-30 | End: 2024-07-30

## 2024-07-30 RX ORDER — KETOCONAZOLE 20 MG/ML
SHAMPOO TOPICAL
Status: CANCELLED | OUTPATIENT
Start: 2024-08-01

## 2024-07-30 RX ORDER — LIDOCAINE 40 MG/G
CREAM TOPICAL
Status: DISCONTINUED | OUTPATIENT
Start: 2024-07-30 | End: 2024-07-30 | Stop reason: HOSPADM

## 2024-07-30 RX ORDER — BUPIVACAINE HYDROCHLORIDE AND EPINEPHRINE 5; 5 MG/ML; UG/ML
INJECTION, SOLUTION EPIDURAL; INTRACAUDAL; PERINEURAL PRN
Status: DISCONTINUED | OUTPATIENT
Start: 2024-07-30 | End: 2024-07-30 | Stop reason: HOSPADM

## 2024-07-30 RX ORDER — ONDANSETRON 2 MG/ML
4 INJECTION INTRAMUSCULAR; INTRAVENOUS EVERY 30 MIN PRN
Status: DISCONTINUED | OUTPATIENT
Start: 2024-07-30 | End: 2024-07-30 | Stop reason: HOSPADM

## 2024-07-30 RX ORDER — DEXAMETHASONE SODIUM PHOSPHATE 4 MG/ML
4 INJECTION, SOLUTION INTRA-ARTICULAR; INTRALESIONAL; INTRAMUSCULAR; INTRAVENOUS; SOFT TISSUE
Status: DISCONTINUED | OUTPATIENT
Start: 2024-07-30 | End: 2024-07-30 | Stop reason: HOSPADM

## 2024-07-30 RX ORDER — HYDROMORPHONE HCL IN WATER/PF 6 MG/30 ML
0.2 PATIENT CONTROLLED ANALGESIA SYRINGE INTRAVENOUS EVERY 5 MIN PRN
Status: DISCONTINUED | OUTPATIENT
Start: 2024-07-30 | End: 2024-07-30 | Stop reason: HOSPADM

## 2024-07-30 RX ORDER — OXYCODONE HYDROCHLORIDE 5 MG/1
10 TABLET ORAL
Status: DISCONTINUED | OUTPATIENT
Start: 2024-07-30 | End: 2024-07-30 | Stop reason: HOSPADM

## 2024-07-30 RX ORDER — SODIUM CHLORIDE, SODIUM LACTATE, POTASSIUM CHLORIDE, CALCIUM CHLORIDE 600; 310; 30; 20 MG/100ML; MG/100ML; MG/100ML; MG/100ML
INJECTION, SOLUTION INTRAVENOUS CONTINUOUS PRN
Status: DISCONTINUED | OUTPATIENT
Start: 2024-07-30 | End: 2024-07-30

## 2024-07-30 RX ORDER — AMOXICILLIN 250 MG
1 CAPSULE ORAL 2 TIMES DAILY
Status: CANCELLED | OUTPATIENT
Start: 2024-07-30

## 2024-07-30 RX ORDER — POLYETHYLENE GLYCOL 3350 17 G/17G
17 POWDER, FOR SOLUTION ORAL DAILY
Status: CANCELLED | OUTPATIENT
Start: 2024-07-31

## 2024-07-30 RX ORDER — ONDANSETRON 2 MG/ML
4 INJECTION INTRAMUSCULAR; INTRAVENOUS EVERY 6 HOURS PRN
Status: CANCELLED | OUTPATIENT
Start: 2024-07-30

## 2024-07-30 RX ORDER — OXYCODONE HYDROCHLORIDE 5 MG/1
5 TABLET ORAL EVERY 4 HOURS PRN
Status: DISCONTINUED | OUTPATIENT
Start: 2024-07-30 | End: 2024-08-01

## 2024-07-30 RX ORDER — BISACODYL 10 MG
10 SUPPOSITORY, RECTAL RECTAL DAILY PRN
Status: CANCELLED | OUTPATIENT
Start: 2024-08-02

## 2024-07-30 RX ORDER — ONDANSETRON 2 MG/ML
INJECTION INTRAMUSCULAR; INTRAVENOUS PRN
Status: DISCONTINUED | OUTPATIENT
Start: 2024-07-30 | End: 2024-07-30

## 2024-07-30 RX ORDER — CEFAZOLIN SODIUM/WATER 2 G/20 ML
2 SYRINGE (ML) INTRAVENOUS
Status: COMPLETED | OUTPATIENT
Start: 2024-07-30 | End: 2024-07-30

## 2024-07-30 RX ORDER — CEFAZOLIN SODIUM/WATER 2 G/20 ML
2 SYRINGE (ML) INTRAVENOUS SEE ADMIN INSTRUCTIONS
Status: DISCONTINUED | OUTPATIENT
Start: 2024-07-30 | End: 2024-07-30 | Stop reason: HOSPADM

## 2024-07-30 RX ORDER — GADOBUTROL 604.72 MG/ML
11 INJECTION INTRAVENOUS ONCE
Status: COMPLETED | OUTPATIENT
Start: 2024-07-30 | End: 2024-07-30

## 2024-07-30 RX ORDER — FENTANYL CITRATE 50 UG/ML
INJECTION, SOLUTION INTRAMUSCULAR; INTRAVENOUS PRN
Status: DISCONTINUED | OUTPATIENT
Start: 2024-07-30 | End: 2024-07-30

## 2024-07-30 RX ORDER — OXYCODONE HYDROCHLORIDE 5 MG/1
5 TABLET ORAL
Status: DISCONTINUED | OUTPATIENT
Start: 2024-07-30 | End: 2024-07-30 | Stop reason: HOSPADM

## 2024-07-30 RX ORDER — SODIUM CHLORIDE, SODIUM LACTATE, POTASSIUM CHLORIDE, CALCIUM CHLORIDE 600; 310; 30; 20 MG/100ML; MG/100ML; MG/100ML; MG/100ML
INJECTION, SOLUTION INTRAVENOUS CONTINUOUS
Status: DISCONTINUED | OUTPATIENT
Start: 2024-07-30 | End: 2024-08-01

## 2024-07-30 RX ORDER — FENTANYL CITRATE 50 UG/ML
25 INJECTION, SOLUTION INTRAMUSCULAR; INTRAVENOUS EVERY 5 MIN PRN
Status: DISCONTINUED | OUTPATIENT
Start: 2024-07-30 | End: 2024-07-30 | Stop reason: HOSPADM

## 2024-07-30 RX ORDER — PROPOFOL 10 MG/ML
INJECTION, EMULSION INTRAVENOUS PRN
Status: DISCONTINUED | OUTPATIENT
Start: 2024-07-30 | End: 2024-07-30

## 2024-07-30 RX ORDER — PROCHLORPERAZINE MALEATE 10 MG
10 TABLET ORAL EVERY 6 HOURS PRN
Status: CANCELLED | OUTPATIENT
Start: 2024-07-30

## 2024-07-30 RX ORDER — LIDOCAINE 40 MG/G
CREAM TOPICAL
Status: CANCELLED | OUTPATIENT
Start: 2024-07-30

## 2024-07-30 RX ORDER — DEXAMETHASONE SODIUM PHOSPHATE 4 MG/ML
INJECTION, SOLUTION INTRA-ARTICULAR; INTRALESIONAL; INTRAMUSCULAR; INTRAVENOUS; SOFT TISSUE PRN
Status: DISCONTINUED | OUTPATIENT
Start: 2024-07-30 | End: 2024-07-30

## 2024-07-30 RX ADMIN — FENTANYL CITRATE 100 MCG: 50 INJECTION INTRAMUSCULAR; INTRAVENOUS at 18:19

## 2024-07-30 RX ADMIN — AMPICILLIN SODIUM AND SULBACTAM SODIUM 3 G: 2; 1 INJECTION, POWDER, FOR SOLUTION INTRAMUSCULAR; INTRAVENOUS at 05:15

## 2024-07-30 RX ADMIN — AMPICILLIN SODIUM AND SULBACTAM SODIUM 3 G: 2; 1 INJECTION, POWDER, FOR SOLUTION INTRAMUSCULAR; INTRAVENOUS at 17:35

## 2024-07-30 RX ADMIN — SODIUM CHLORIDE, POTASSIUM CHLORIDE, SODIUM LACTATE AND CALCIUM CHLORIDE: 600; 310; 30; 20 INJECTION, SOLUTION INTRAVENOUS at 18:13

## 2024-07-30 RX ADMIN — AMPICILLIN SODIUM AND SULBACTAM SODIUM 3 G: 2; 1 INJECTION, POWDER, FOR SOLUTION INTRAMUSCULAR; INTRAVENOUS at 22:41

## 2024-07-30 RX ADMIN — Medication 2 G: at 18:13

## 2024-07-30 RX ADMIN — PANTOPRAZOLE SODIUM 40 MG: 40 TABLET, DELAYED RELEASE ORAL at 09:47

## 2024-07-30 RX ADMIN — GABAPENTIN 400 MG: 400 CAPSULE ORAL at 09:46

## 2024-07-30 RX ADMIN — OXYCODONE HYDROCHLORIDE 5 MG: 5 TABLET ORAL at 21:30

## 2024-07-30 RX ADMIN — LIDOCAINE HYDROCHLORIDE 30 MG: 20 INJECTION, SOLUTION INFILTRATION; PERINEURAL at 18:19

## 2024-07-30 RX ADMIN — CHLORHEXIDINE GLUCONATE 4%: 4 LIQUID TOPICAL at 09:47

## 2024-07-30 RX ADMIN — SODIUM CHLORIDE, POTASSIUM CHLORIDE, SODIUM LACTATE AND CALCIUM CHLORIDE: 600; 310; 30; 20 INJECTION, SOLUTION INTRAVENOUS at 21:29

## 2024-07-30 RX ADMIN — SODIUM CHLORIDE, POTASSIUM CHLORIDE, SODIUM LACTATE AND CALCIUM CHLORIDE: 600; 310; 30; 20 INJECTION, SOLUTION INTRAVENOUS at 17:46

## 2024-07-30 RX ADMIN — AMLODIPINE BESYLATE 10 MG: 10 TABLET ORAL at 09:46

## 2024-07-30 RX ADMIN — ONDANSETRON 4 MG: 2 INJECTION INTRAMUSCULAR; INTRAVENOUS at 18:27

## 2024-07-30 RX ADMIN — FENTANYL CITRATE 50 MCG: 50 INJECTION, SOLUTION INTRAMUSCULAR; INTRAVENOUS at 19:52

## 2024-07-30 RX ADMIN — GADOBUTROL 11 ML: 604.72 INJECTION INTRAVENOUS at 06:22

## 2024-07-30 RX ADMIN — PROPOFOL 200 MG: 10 INJECTION, EMULSION INTRAVENOUS at 18:19

## 2024-07-30 RX ADMIN — ACETAMINOPHEN 975 MG: 325 TABLET, FILM COATED ORAL at 06:18

## 2024-07-30 RX ADMIN — ATORVASTATIN CALCIUM 40 MG: 20 TABLET, FILM COATED ORAL at 22:45

## 2024-07-30 RX ADMIN — AMPICILLIN SODIUM AND SULBACTAM SODIUM 3 G: 2; 1 INJECTION, POWDER, FOR SOLUTION INTRAMUSCULAR; INTRAVENOUS at 11:05

## 2024-07-30 RX ADMIN — FENTANYL CITRATE 50 MCG: 50 INJECTION, SOLUTION INTRAMUSCULAR; INTRAVENOUS at 19:40

## 2024-07-30 RX ADMIN — PROPRANOLOL HYDROCHLORIDE 20 MG: 20 TABLET ORAL at 09:46

## 2024-07-30 RX ADMIN — GABAPENTIN 400 MG: 400 CAPSULE ORAL at 14:07

## 2024-07-30 RX ADMIN — MIDAZOLAM 2 MG: 1 INJECTION INTRAMUSCULAR; INTRAVENOUS at 18:13

## 2024-07-30 RX ADMIN — ENOXAPARIN SODIUM 105 MG: 120 INJECTION SUBCUTANEOUS at 05:18

## 2024-07-30 RX ADMIN — DEXAMETHASONE SODIUM PHOSPHATE 8 MG: 4 INJECTION, SOLUTION INTRA-ARTICULAR; INTRALESIONAL; INTRAMUSCULAR; INTRAVENOUS; SOFT TISSUE at 18:19

## 2024-07-30 ASSESSMENT — ACTIVITIES OF DAILY LIVING (ADL)
ADLS_ACUITY_SCORE: 20
ADLS_ACUITY_SCORE: 21
ADLS_ACUITY_SCORE: 20

## 2024-07-30 NOTE — TELEPHONE ENCOUNTER
[facility] Call Center    Phone Message    May a detailed message be left on voicemail: yes     Reason for Call: Other: patient calling as he is in the ED and would like someone from the care team to give him a call.      Action Taken: Other: te    Travel Screening: Not Applicable     Date of Service:

## 2024-07-30 NOTE — PLAN OF CARE
"Goal Outcome Evaluation:      Plan of Care Reviewed With: patient    Overall Patient Progress: improvingOverall Patient Progress: improving    Outcome Evaluation: ERIKA iced, Unasyn abx.  Pt is A & O x 4, ind, Unasyn abx, NPO @ midnight, BG checks with sliding scale, repeat exam tomorrow pending MRI findings per orthopedics.    Problem: Adult Inpatient Plan of Care  Goal: Plan of Care Review  Description: The Plan of Care Review/Shift note should be completed every shift.  The Outcome Evaluation is a brief statement about your assessment that the patient is improving, declining, or no change.  This information will be displayed automatically on your shift  note.  Outcome: Progressing  Flowsheets (Taken 7/29/2024 2120)  Outcome Evaluation: ERIKA iced, Unasyn abx.  Plan of Care Reviewed With: patient  Overall Patient Progress: improving  Goal: Patient-Specific Goal (Individualized)  Description: You can add care plan individualizations to a care plan. Examples of Individualization might be:  \"Parent requests to be called daily at 9am for status\", \"I have a hard time hearing out of my right ear\", or \"Do not touch me to wake me up as it startles  me\".  Outcome: Progressing  Goal: Absence of Hospital-Acquired Illness or Injury  Outcome: Progressing  Intervention: Identify and Manage Fall Risk  Recent Flowsheet Documentation  Taken 7/29/2024 2059 by Cirilo Anderson, RN  Safety Promotion/Fall Prevention:   clutter free environment maintained   nonskid shoes/slippers when out of bed   safety round/check completed   treat underlying cause   patient and family education  Intervention: Prevent Skin Injury  Recent Flowsheet Documentation  Taken 7/29/2024 2059 by Cirilo Anderson RN  Body Position: position changed independently  Device Skin Pressure Protection:   tubing/devices free from skin contact   adhesive use limited  Goal: Optimal Comfort and Wellbeing  Outcome: Progressing  Intervention: Monitor Pain and Promote " Comfort  Recent Flowsheet Documentation  Taken 7/29/2024 2036 by Cirilo Anderson RN  Pain Management Interventions: declines  Goal: Readiness for Transition of Care  Outcome: Progressing     Problem: Comorbidity Management  Goal: Blood Glucose Levels Within Targeted Range  Outcome: Progressing  Intervention: Monitor and Manage Glycemia  Recent Flowsheet Documentation  Taken 7/29/2024 2059 by Cirilo Anderson RN  Medication Review/Management: medications reviewed  Goal: Blood Pressure in Desired Range  Outcome: Progressing  Intervention: Maintain Blood Pressure Management  Recent Flowsheet Documentation  Taken 7/29/2024 2059 by Cirilo Anderson RN  Medication Review/Management: medications reviewed     Problem: Skin or Soft Tissue Infection  Goal: Absence of Infection Signs and Symptoms  Outcome: Progressing  Intervention: Provide Meticulous Infection Site Care  Recent Flowsheet Documentation  Taken 7/29/2024 2059 by Cirilo Anderson RN  Topical Inflammation Care:   border marked   border expansion noted

## 2024-07-30 NOTE — PROVIDER NOTIFICATION
1910: Cross cover page: Per carb count Insulin order, patient needs 20 units total of Novolog but asking to get 10 units. Blood glucose 210 but also declining sliding scale. Please advise    Order: Dr Randall: Carb count order changed 1 unit for 6g.

## 2024-07-30 NOTE — TELEPHONE ENCOUNTER
Patient calls to report he is currently in [facility] emergency room and patient has appointment with Dr. Jimenez, podiatrist tomorrow at [facility], but patient is wondering if Dr. Jimenez is able to visit patient in hospital since patient is currently in emergency room and patient thinks he will still be in hospital through tomorrow.    Patient asking to be connected to Dr. Jimenez RN/care team.     Call to [facility] - [phone] to try to connect with Dr. Lechuga's care team. Coordinator unable to connect this writer to care team.     Routing message to provider and [facility] RN pool (not sure if this is correct pool for care team).    Thank you,  Crow Velez, Triage RN [employer]  12:09 PM 7/30/2024

## 2024-07-30 NOTE — TELEPHONE ENCOUNTER
Duplicate encounter, patient called earlier too and has a phone encounter under Dr Soto's name also.     Routing to provider, please advise on recommendations.     Marilia Matias, FELICIANO, LAT, ATC  Certified Athletic Trainer

## 2024-07-30 NOTE — PLAN OF CARE
"Goal Outcome Evaluation: [id number]    Vitals: VSS. Afebrile. Independent in room. Pt rested comfortably between cares.   Resp: WDL.  LS clear and equal   GI/: WDL NPO. Voiding WDL.  IV: WDL dressing c/d/i, saline locked  Pain/Comfort: 6/10.   Skin: RUE warm, swollen, red, tender. Puncture wound to R hand.  Boarders previously outlined.  Pt reports numbness to finger tips. Elevated and ice applied   Plan: Surgery plans to see pt around 1730, tentative surgical time of 1800.  Continue with current cares      Plan of Care Reviewed With: patient    Overall Patient Progress: no changeOverall Patient Progress: no change    Problem: Adult Inpatient Plan of Care  Goal: Plan of Care Review  Description: The Plan of Care Review/Shift note should be completed every shift.  The Outcome Evaluation is a brief statement about your assessment that the patient is improving, declining, or no change.  This information will be displayed automatically on your shift  note.  Outcome: Progressing  Flowsheets (Taken 7/30/2024 1317)  Plan of Care Reviewed With: patient  Overall Patient Progress: no change  Goal: Patient-Specific Goal (Individualized)  Description: You can add care plan individualizations to a care plan. Examples of Individualization might be:  \"Parent requests to be called daily at 9am for status\", \"I have a hard time hearing out of my right ear\", or \"Do not touch me to wake me up as it startles  me\".  Outcome: Progressing  Goal: Absence of Hospital-Acquired Illness or Injury  Outcome: Progressing  Intervention: Identify and Manage Fall Risk  Recent Flowsheet Documentation  Taken 7/30/2024 1202 by oNhemi Whittaker, RN  Safety Promotion/Fall Prevention:   clutter free environment maintained   patient and family education   room near nurse's station   room organization consistent   safety round/check completed  Intervention: Prevent Skin Injury  Recent Flowsheet Documentation  Taken 7/30/2024 1202 by Nohemi Whittaker, " RN  Body Position: position changed independently  Skin Protection: adhesive use limited  Device Skin Pressure Protection:   adhesive use limited   tubing/devices free from skin contact  Intervention: Prevent Infection  Recent Flowsheet Documentation  Taken 7/30/2024 1202 by Nohemi Whittaker RN  Infection Prevention:   equipment surfaces disinfected   hand hygiene promoted   rest/sleep promoted  Goal: Optimal Comfort and Wellbeing  Outcome: Progressing  Intervention: Monitor Pain and Promote Comfort  Recent Flowsheet Documentation  Taken 7/30/2024 1202 by Nohemi Whittaker RN  Pain Management Interventions:   medication offered but refused   cold applied  Goal: Readiness for Transition of Care  Outcome: Progressing     Problem: Comorbidity Management  Goal: Blood Glucose Levels Within Targeted Range  Outcome: Progressing  Intervention: Monitor and Manage Glycemia  Recent Flowsheet Documentation  Taken 7/30/2024 1202 by Nohemi Whittaker RN  Glycemic Management: (PT REFUSIG INSULIN) blood glucose monitored  Medication Review/Management: medications reviewed  Goal: Blood Pressure in Desired Range  Outcome: Progressing  Intervention: Maintain Blood Pressure Management  Recent Flowsheet Documentation  Taken 7/30/2024 1202 by Nohemi Whittaker RN  Medication Review/Management: medications reviewed     Problem: Skin or Soft Tissue Infection  Goal: Absence of Infection Signs and Symptoms  Outcome: Progressing  Intervention: Minimize and Manage Infection Progression  Recent Flowsheet Documentation  Taken 7/30/2024 1202 by Nohemi Whittaker RN  Infection Prevention:   equipment surfaces disinfected   hand hygiene promoted   rest/sleep promoted  Intervention: Provide Meticulous Infection Site Care  Recent Flowsheet Documentation  Taken 7/30/2024 1202 by Nohemi Whittaker RN  Topical Inflammation Care: (PREVIOUS BOARDERS OUTLINED) border marked

## 2024-07-30 NOTE — CONSULTS
[facility]  CONSULTATION NOTE    CHIEF COMPLAINT:        PATIENT HISTORY:  Crispin Rojas is a 64 year old male  with a past medical history significant for what's listed below, was admitted for a right hand infection following a dog bite. He was scheduled to follow up with myself tomorrow for his left foot and requested to be seen while here. He had a left foot transmetatarsal amputation on 6/18/24. Site has been healing and sutures were removed at last appt. He states he's been walking a fair amount, up to 3 miles a day, while wearing his air cast boot. Denies drainage or pain from left foot.         Review of Systems:  A 10 point review of systems was performed and is positive for that noted above in the patient history.  All other areas are negative.     PAST MEDICAL HISTORY:   Past Medical History:   Diagnosis Date    Antiplatelet or antithrombotic long-term use     Ascending aorta dilatation (H24)     Cerebral artery occlusion with cerebral infarction (H)     Cerebral infarction (H)     2010    Gastroesophageal reflux disease with esophagitis     H/O blood clots 2022    Hyperlipidemia LDL goal <70     Hypertension     Nonalcoholic steatohepatitis 11/30/2022    Numbness and tingling     Obesity     GILA (obstructive sleep apnea) 10/22/2018    CPAP intolerant    PVD (peripheral vascular disease) (H24)     s/p left partial toe amputation    Renal stone     Tremor     Type 2 diabetes mellitus with diabetic polyneuropathy, with long-term current use of insulin (H)         PAST SURGICAL HISTORY:   Past Surgical History:   Procedure Laterality Date    AMPUTATE FOOT Left 8/18/2016    Procedure: AMPUTATE FOOT;  Surgeon: Jack Younger DPEDDIE;  Location: [facility]    AMPUTATE TOE(S) Right 2/1/2016    Procedure: AMPUTATE TOE(S);  Surgeon: Rachelle Manriquez DPM, Pod;  Location: [facility]    AMPUTATE TOE(S) Right 8/2/2019    Procedure: Right fourth toe partial amputation for treatment of  osteomyelitis.;  Surgeon: Jack Younger DPM;  Location: [facility]    AMPUTATE TOE(S) Left 11/8/2019    Procedure: Left second toe amputation at the metatarsophalangeal joint.;  Surgeon: Rachelle Manriquez DPM, Podiatry/Foot and Ankle Surgery;  Location: [facility]    AMPUTATE TOE(S) Right 6/5/2022    Procedure: AMPUTATION OF RIGHT GREAT TOE;  Surgeon: Wili Quiles DPM;  Location: [facility]    AMPUTATE TOE(S) Right 7/28/2022    Procedure: Right foot partial first metatarsal resection;  Surgeon: Tiny Soto DPM;  Location: [facility]    AMPUTATE TOE(S) Left 6/18/2024    Procedure: Left foot transmetatarsal amputation;  Surgeon: Tiny Soto DPM;  Location: [facility]    ANGIOGRAM      BIOPSY BONE FOOT Right 7/24/2022    Procedure: Bone biopsy, right first metatarsal.;  Surgeon: Valentino Molina DPM;  Location: [facility]    COLONOSCOPY      COMBINED CYSTOSCOPY, RETROGRADES, URETEROSCOPY, INSERT STENT Left 8/21/2016    Procedure: COMBINED CYSTOSCOPY, RETROGRADES, URETEROSCOPY, INSERT STENT;  Surgeon: Artemio Valenzuela MD;  Location: [facility]    COMBINED CYSTOSCOPY, RETROGRADES, URETEROSCOPY, LASER HOLMIUM LITHOTRIPSY URETER(S), INSERT STENT Left 10/3/2016    Procedure: COMBINED CYSTOSCOPY, RETROGRADES, URETEROSCOPY, LASER HOLMIUM LITHOTRIPSY URETER(S), INSERT STENT;  Surgeon: Artemio Valenzuela MD;  [facility]: [facility]    EXTRACORPOREAL SHOCK WAVE LITHOTRIPSY (ESWL) Left 9/8/2016    Procedure: EXTRACORPOREAL SHOCK WAVE LITHOTRIPSY (ESWL);  Surgeon: Artemio Valenzuela MD;  Location: [facility]    INCISION AND DRAINAGE FOOT, COMBINED Right 7/24/2022    Procedure: Incision and drainage, right foot ;  Surgeon: Valentino Molina DPM;  Location: [facility]    IRRIGATION AND DEBRIDEMENT FOOT, COMBINED Left 10/19/2023    Procedure: Left foot second metatarsal resection , . Left plantar ulcer excisional debridement, down to and including tendon, with closure, site measuring 4 cm x 2 cm x 1 cm;  Surgeon: Tiny Soto  SHRUTHI;  Location: [facility]    OSTEOTOMY FOOT Right 11/30/2022    Procedure: 1. Right second metatarsal floating osteotomy  2. Right second digit proximal phalanx arthroplasty 3. Right percutaneous flexor tenotomy;  Surgeon: Tiny Soto DPM;  Location: [facility]    OSTEOTOMY FOOT Right 1/19/2023    Procedure: Right foot third metatarsal head excision, ulcer debridement, matatarsal osteotomies;  Surgeon: Tiny Soto DPM;  Location: [facility]    RECESSION GASTROCNEMIUS Right 2/1/2016    Procedure: RECESSION GASTROCNEMIUS;  Surgeon: Rachelle Manriquez DPM, Pod;  Location: [facility]        MEDICATIONS:  Reviewed in Epic. Current.     ALLERGIES:    Allergies   Allergen Reactions    Statins Swelling     Other reaction(s): Other (see comments)  SIMCOR caused edema in extremities  Only Simvastatin    Bactrim [Sulfamethoxazole-Trimethoprim] Nausea and Vomiting    Sulfatolamide Nausea and Vomiting    Niacin Swelling     SIMCOR caused edema in extremities    Simvastatin Swelling     SIMCOR caused edema in extremities    Sulfa Antibiotics Nausea        SOCIAL HISTORY:   Social History     Socioeconomic History    Marital status:      Spouse name: Sydney    Number of children: 2    Years of education: Not on file    Highest education level: Master's degree (e.g., MA, MS, [place], MEd, MSW, ELIANA)   Occupational History    Occupation: marketing     Employer: [employer]     Comment: [employer]   Tobacco Use    Smoking status: Never    Smokeless tobacco: Never   Vaping Use    Vaping status: Never Used   Substance and Sexual Activity    Alcohol use: Yes     Comment: rare---red wine 3x per month    Drug use: Never    Sexual activity: Not Currently     Partners: Female   Other Topics Concern    Parent/sibling w/ CABG, MI or angioplasty before 65F 55M? No   Social History Narrative    Not on file     Social Determinants of Health     Financial Resource Strain: Low Risk  (7/5/2024)    Financial Resource Strain     Within the past 12  months, have you or your family members you live with been unable to get utilities (heat, electricity) when it was really needed?: No   Food Insecurity: Low Risk  (7/5/2024)    Food Insecurity     Within the past 12 months, did you worry that your food would run out before you got money to buy more?: No     Within the past 12 months, did the food you bought just not last and you didn t have money to get more?: No   Transportation Needs: Low Risk  (7/5/2024)    Transportation Needs     Within the past 12 months, has lack of transportation kept you from medical appointments, getting your medicines, non-medical meetings or appointments, work, or from getting things that you need?: No   Physical Activity: Sufficiently Active (7/5/2024)    Exercise Vital Sign     Days of Exercise per Week: 6 days     Minutes of Exercise per Session: 80 min   Stress: No Stress Concern Present (7/5/2024)    [employer] [place] [employer] - Occupational Stress Questionnaire     Feeling of Stress : Not at all   Social Connections: Socially Integrated (7/5/2024)    Social Connection and Isolation Panel [NHANES]     Frequency of Communication with Friends and Family: Twice a week     Frequency of Social Gatherings with Friends and Family: More than three times a week     Attends Buddhist Services: More than 4 times per year     Active Member of Clubs or Organizations: Yes     Attends Club or Organization Meetings: More than 4 times per year     Marital Status:    Interpersonal Safety: Low Risk  (7/5/2024)    Interpersonal Safety     Do you feel physically and emotionally safe where you currently live?: Yes     Within the past 12 months, have you been hit, slapped, kicked or otherwise physically hurt by someone?: No     Within the past 12 months, have you been humiliated or emotionally abused in other ways by your partner or ex-partner?: No   Housing Stability: Low Risk  (7/5/2024)    Housing Stability     Do you have housing?  ": Yes     Are you worried about losing your housing?: No        FAMILY HISTORY:   Family History   Problem Relation Age of Onset    Arthritis Mother         SLE    Connective Tissue Disorder Mother         lupus    Diabetes Mother     Cerebrovascular Disease Mother     Cancer Mother     Diabetes Father     Coronary Artery Disease Father     Chronic Obstructive Pulmonary Disease Father     Diabetes Sister     Diabetes Maternal Grandfather         EXAM:Vitals: /71 (BP Location: Left arm)   Pulse 65   Temp 98  F (36.7  C) (Oral)   Resp 16   Ht 1.803 m (5' 11\")   Wt 110.7 kg (244 lb)   SpO2 98%   BMI 34.03 kg/m    BMI= Body mass index is 34.03 kg/m .    LABS:     Last Comprehensive Metabolic Panel:  Sodium   Date Value Ref Range Status   07/29/2024 136 135 - 145 mmol/L Final   05/10/2021 139 133 - 144 mmol/L Final     Potassium   Date Value Ref Range Status   07/29/2024 4.0 3.4 - 5.3 mmol/L Final   07/26/2022 4.4 3.4 - 5.3 mmol/L Final   05/10/2021 3.6 3.4 - 5.3 mmol/L Final     Chloride   Date Value Ref Range Status   07/29/2024 103 98 - 107 mmol/L Final   07/25/2022 109 94 - 109 mmol/L Final   05/10/2021 108 94 - 109 mmol/L Final     Carbon Dioxide   Date Value Ref Range Status   05/10/2021 27 20 - 32 mmol/L Final     Carbon Dioxide (CO2)   Date Value Ref Range Status   07/29/2024 23 22 - 29 mmol/L Final   07/25/2022 25 20 - 32 mmol/L Final     Anion Gap   Date Value Ref Range Status   07/29/2024 10 7 - 15 mmol/L Final   07/25/2022 7 3 - 14 mmol/L Final   05/10/2021 4 3 - 14 mmol/L Final     Glucose   Date Value Ref Range Status   07/30/2024 151 (H) 70 - 99 mg/dL Final   07/25/2022 107 (H) 70 - 99 mg/dL Final   05/10/2021 68 (L) 70 - 99 mg/dL Final     GLUCOSE BY METER POCT   Date Value Ref Range Status   07/30/2024 111 (H) 70 - 99 mg/dL Final     Urea Nitrogen   Date Value Ref Range Status   07/29/2024 17.1 8.0 - 23.0 mg/dL Final   07/25/2022 16 7 - 30 mg/dL Final   05/10/2021 15 7 - 30 mg/dL Final "     Creatinine   Date Value Ref Range Status   07/29/2024 1.06 0.67 - 1.17 mg/dL Final   05/10/2021 0.86 0.66 - 1.25 mg/dL Final     GFR Estimate   Date Value Ref Range Status   07/29/2024 79 >60 mL/min/1.73m2 Final     Comment:     eGFR calculated using 2021 CKD-EPI equation.   05/10/2021 >90 >60 mL/min/[1.73_m2] Final     Comment:     Non  GFR Calc  Starting 12/18/2018, serum creatinine based estimated GFR (eGFR) will be   calculated using the Chronic Kidney Disease Epidemiology Collaboration   (CKD-EPI) equation.       Calcium   Date Value Ref Range Status   07/29/2024 8.5 (L) 8.8 - 10.4 mg/dL Final     Comment:     Reference intervals for this test were updated on 7/16/2024 to reflect our healthy population more accurately. There may be differences in the flagging of prior results with similar values performed with this method. Those prior results can be interpreted in the context of the updated reference intervals.   05/10/2021 8.5 8.5 - 10.1 mg/dL Final     Lab Results   Component Value Date    WBC 7.6 07/29/2024    WBC 6.5 05/10/2021     Lab Results   Component Value Date    RBC 3.77 07/29/2024    RBC 4.14 05/10/2021     Lab Results   Component Value Date    HGB 10.5 07/29/2024    HGB 12.1 05/10/2021     Lab Results   Component Value Date    HCT 33.2 07/29/2024    HCT 37.8 05/10/2021     Lab Results   Component Value Date     07/29/2024     05/10/2021      Lab Results   Component Value Date    INR 1.08 11/07/2019    INR 1.04 08/19/2013        General appearance: Patient is alert and fully cooperative with history & exam.  No sign of distress is noted during the visit.      Respiratory: Breathing is regular & unlabored while sitting.      HEENT: Hearing is intact to spoken word.  Speech is clear.  No gross evidence of visual impairment that would impact ambulation.      Dermatologic: Left foot incision site: healing and epithelialized. No dehiscence or drainage. No cellulitis.    Right foot examined: no open lesions.      Vascular: Dorsalis pedis and posterior tibial pulses are intact & regular bilaterally.  CFT and skin temperature is normal to both lower extremities.       Neurologic: Lower extremity sensation is diminished, bilateral foot, to light touch.  No evidence of neurological-based weakness or contracture in the lower extremities.       Musculoskeletal: Patient is ambulatory without an assistive device or brace. S/p left foot TMA    Psychiatric: Affect is pleasant & appropriate.      All cultures:  Recent Labs   Lab 07/28/24  0501   CULTURE No growth after 2 days        IMAGING:     ASSESSMENT:  S/p left foot TMA on 6/18/24     MEDICAL DECISION MAKING:   -Patient was scheduled to see myself tomorrow for his left foot. Seen while admitted, continues to heal appropriately.   -No further dressing needed to left foot. Can be full WB to b/l feet.   -Discussed need to transition to full activity. Patient asks about golfing and work, recommend starting at 25% of full activity and going up each week. Also hasn't picked up his custom molded orthesis, needs these before returning to work full time.   -Further follow up outpatient with myself. Recommend in 3-4 weeks.   -Will sign off. Call with questions.     Thank you for the consultation request and the opportunity to participate in the care of Crispin Soto DPM   [facility]le [facility]  [phone]

## 2024-07-30 NOTE — PROGRESS NOTES
Orthopedic Surgery  Crispin Rojas  07/30/2024     Admit Date:  7/28/2024  Right hand dog bite      Patient resting comfortably in bed.    Reports his hand pain is worse today and had increased pain in his forearm  He is NPO    Temp:  [97.7  F (36.5  C)-98.6  F (37  C)] 98  F (36.7  C)  Pulse:  [63-78] 64  Resp:  [14-18] 16  BP: (124-170)/(65-82) 124/65  SpO2:  [93 %-98 %] 97 %    Alert and oriented. NAD. Non-toxic appearing. Breathing comfortably ORA.  Right dorsal hand puncture ellen from previous dog bite. Scant eschar to puncture site. No active or expressible drainage. No apparent fluid collection palpated superficially.  Moderate diffuse edema with pitting to the right dorsal hand and extending into all digits.   No significant erythema. No ecchymosis.  Right upper extremity is NVI.  Able to flex, extend, abduct, and adduct digits but with significant limitation due to swelling.  Able to actively flex and extend the wrist.  Radial pulse palpable.  Digits are warm.  Capillary refill <2 seconds.  Sensation intact to light touch in the axillary, median, ulnar, and radial nerve distributions.    Labs:  Recent Labs   Lab Test 07/29/24  0652 07/28/24  0501 07/05/24  1157   WBC 7.6 9.0 6.3   HGB 10.5* 11.4* 11.5*   * 154 194     Recent Labs   Lab Test 11/07/19  1817   INR 1.08     Recent Labs   Lab Test 07/30/24  0828 07/29/24  0652 07/28/24  1400   CRPI 170.45* 171.35* 62.34*     1. PLAN:  Patient scheduled for an I&D of the hand this evening   Surgeon: Dr Mcdaniels  NPO Status effective now  Continue IV ABx  Hold anticoagulants if taken  Elevate at rest and continue hand soaks  Pain medication as needed, minimize narcotics as able      Alyse Pandey PA-C

## 2024-07-30 NOTE — PROGRESS NOTES
Hospitalist Medicine Progress Note   [facility]       Crispin Rojas is a 64 year old gentleman with DVT on apixaban, CVA, essential hypertension, hypercholesteremia, T2DM, peripheral vascular disease, obstructive sleep apnea came to [facility] 7/28/2024 after a dog bite 5 days prior to admission while petting an unknown dog that belonged to his neighbor and having swelling and pain of the right hand unable to make a fist due to pain, fevers WBC of 9.0 was started on Unasyn 7/28/2024 was evaluated by orthopedic surgery who did not think that the patient has tenosynovitis and to continue the antibiotic management with elevation of the right hand.  Splint was not thought to be needed       Date of Admission:  7/28/2024  Date of Service: 07/30/2024      Assessment & Plan     Right hand dog bite  Right hand cellulitis  Patient came in with right hand pain, swelling, fevers after dog bite 5-day prior to admission from an unknown dog in the neighborhood  Patient's CRP increased from 20 to 171 on Unasyn though the white count has somewhat decreased from 9 to 7.6  Vaccination was given 2022  There is no growth in the blood culture drawn 7/28/2024  Ultrasound right hand was ordered by orthopedic surgery 7/29/2024 and patient states that orthopedic surgery said that should there be any abscess this will be drained  MRI 07/30 -> Cellulitis over the dorsum of the right hand with a partially loculated hematoma or abscess collection deep to what appears to be a puncture wound superficial to the level of the distal third metacarpal shaft. No osteomyelitis, septic arthritis or  tendon defect is present.  Plan:  - Follow CRP  - Ortho following -> hand surgery to see today  - Pain control as needed  - Follow vitals/temp  - Keep NPO for hand surgery eval    DM2  -Continue degludec 35 units daily  -Medium intensity sliding scale insulin -she does not want to take this but rather want to take what  he takes at home 1 unit of short acting insulin for 3 g of carbs in other words 5 units of insulin for 15 grams of carbohydrate  -Hold metformin  -pt is also on scheduled aspart 9 units/gm carb PTA     Recurrent DVT  -Holding apixaban   -Last took 0100 on 07/28     History of CVA  -Continue statin  -Restart apixaban when able     HTN  -Continue amlodipine, propranolol  -Hold HCTZ and lisinopril     HLD  -Continue atorvastatin 40 mg daily     Pain/neuropathy   -continue gabapentin     Obesity  -Will require extra nursing resources routine cares          Plan:   Ultrasound right hand was ordered by orthopedic surgery 7/29/2024      Diet: NPO per Anesthesia Guidelines for Procedure/Surgery Except for: Meds, Ice Chips    DVT Prophylaxis: Enoxaparin (Lovenox) SQ  Mccarthy Catheter: Not present  Code Status: Full Code               The patient's care was discussed with the Patient and RN.    Yared Baker MD  Hospitalist Service  [facility]    ______________________________________________________________________    Interval History     Symptoms   Patient's right hand continues to be painful and swollen and he is unable to make a fist  No fevers but some chills  Redness extending up forearm per patient  No CP/SOB  Denies any nausea vomiting or fever  No new complaints    Data reviewed today: I reviewed all medications, new labs and imaging results over the last 24 hours.     Physical Exam   Vital Signs: Temp: 98  F (36.7  C) Temp src: Oral BP: 124/65 Pulse: 64   Resp: 16 SpO2: 97 % O2 Device: None (Room air)    Weight: 244 lbs 0 oz      GENERAL: Patient is not in acute distress  HEART: S1 S2 regular Rate and Rhythm, there is  no murmur,   LUNGS: Respirations are  not laboured, Lungs are  clear to auscultation without Crepitations or Wheezing   ABDOMEN: Soft , there is no tenderness , Bowel Sounds are  Positive   LOWER LIMBS: no  Pedal Edema  Bilaterally   CNS:  Alert,  Oriented x 3, Moving all the Four  Limbs   RIGHT UPPER LIMB : Right hand is swollen and fluctuant on the dorsum with the bite ellen    Medical Decision Making       High Complexity Decision Making         Data   Recent Labs   Lab 07/30/24  0828 07/30/24  0153 07/29/24  2209 07/29/24  0937 07/29/24  0652 07/28/24  0911 07/28/24  0501   WBC  --   --   --   --  7.6  --  9.0   HGB  --   --   --   --  10.5*  --  11.4*   MCV  --   --   --   --  88  --  89   PLT  --   --   --   --  120*  --  154   NA  --   --   --   --  136  --  137   POTASSIUM  --   --   --   --  4.0  --  4.3   CHLORIDE  --   --   --   --  103  --  102   CO2  --   --   --   --  23  --  20*   BUN  --   --   --   --  17.1  --  25.2*   CR  --   --   --   --  1.06  --  1.17   ANIONGAP  --   --   --   --  10  --  15   NARA  --   --   --   --  8.5*  --  9.4   * 146* 185*   < > 186*   < > 98    < > = values in this interval not displayed.         Recent Results (from the past 24 hour(s))   MR Hand Right w/o & w Contrast    Narrative    EXAM: MR HAND RIGHT W/O and W CONTRAST  LOCATION: [facility]  DATE: 7/30/2024    INDICATION: Right dorsal hand dog bite, eval for fluid collection or signs of deep infection  COMPARISON: Hand x-ray July 28, 2024.  TECHNIQUE: Routine. Additional postgadolinium T1 sequences were obtained.  IV CONTRAST: 11 mL Gadavist    FINDINGS:     TENDONS:   -Extensor tendons: No tendon tear, tendinopathy, or tenosynovitis.  -Flexor tendons: No tear, tendinopathy, or tenosynovitis.    LIGAMENTS:  -Visualized collateral and capsular ligaments: Normal.    JOINTS AND BONES:   -No fracture or bone marrow edema. Normal articular cartilage. No joint effusion or synovitis.    MUSCLES AND SOFT TISSUES:   -Prominent subcutaneous edema and enhancement around the hand, mostly along the dorsum of the hand. The edema and enhancement extends deep to the extensor tendons over the dorsum of the hand from the level of the wrist to the metacarpal heads. Hand    musculature appears normal.    The postcontrast images show a nonenhancing fluid collection which appears to be deep to a puncture wound (series 11 images 23 through 29). The collection measures 22 mm transverse, 7 mm thick, and approximately 3 cm long. This remains superficial to the   extensor tendons. No other focal fluid collection is identified.      Impression    IMPRESSION:  1.  Cellulitis over the dorsum of the right hand with a partially loculated hematoma or abscess collection deep to what appears to be a puncture wound superficial to the level of the distal third metacarpal shaft. No osteomyelitis, septic arthritis or   tendon defect is present.

## 2024-07-30 NOTE — ANESTHESIA PROCEDURE NOTES
Airway    Staff -        Performed By: CRNA    Final Airway Details       Final airway type: supraglottic airway    Supraglottic Airway Details        Type: LMA       Brand: I-Gel       LMA size: 5    Post intubation assessment        Placement verified by: capnometry        Ease of procedure: easy

## 2024-07-30 NOTE — PLAN OF CARE
"Goal Outcome Evaluation [id number]:      Plan of Care Reviewed With: patient, spouse    Overall Patient Progress: no changeOverall Patient Progress: no change    Orientation: Alert and oriented x4  VSS. 98% on RA.   LS: Clear and equal bilaterally.   GI: Pt is passing gas. No BM this shift. Denies N/V.   : Adequate urine output. Pt ambulating to bathroom to void as needed.   Skin: Puncture wound from dog bite to R hand, scabbed and red. Edema to R hand and arm, redness borders marked, border recession noted.   Activity: Independent. CHG wipes x2 done before surgery this evening. Pt slept comfortably throughout shift.   Pain: 6/10. Pt offered medication but he refused.   Updates/Plan: Pt undergoing irrigation and debridement in OR this evening. Continue IV abx. Manage pain as needed. Continue with current cares.     Problem: Adult Inpatient Plan of Care  Goal: Plan of Care Review  Description: The Plan of Care Review/Shift note should be completed every shift.  The Outcome Evaluation is a brief statement about your assessment that the patient is improving, declining, or no change.  This information will be displayed automatically on your shift  note.  Outcome: Progressing  Flowsheets (Taken 7/30/2024 1842)  Plan of Care Reviewed With:   patient   spouse  Overall Patient Progress: no change  Goal: Patient-Specific Goal (Individualized)  Description: You can add care plan individualizations to a care plan. Examples of Individualization might be:  \"Parent requests to be called daily at 9am for status\", \"I have a hard time hearing out of my right ear\", or \"Do not touch me to wake me up as it startles  me\".  Outcome: Progressing  Goal: Absence of Hospital-Acquired Illness or Injury  Outcome: Progressing  Intervention: Identify and Manage Fall Risk  Recent Flowsheet Documentation  Taken 7/30/2024 1608 by Kassei Tuttle RN  Safety Promotion/Fall Prevention:   activity supervised   assistive device/personal items within " reach   clutter free environment maintained   lighting adjusted   nonskid shoes/slippers when out of bed   patient and family education   room near nurse's station   room organization consistent   safety round/check completed   supervised activity   treat underlying cause  Intervention: Prevent Skin Injury  Recent Flowsheet Documentation  Taken 7/30/2024 1608 by Kassie Tuttle RN  Body Position: position changed independently  Skin Protection: adhesive use limited  Device Skin Pressure Protection:   adhesive use limited   tubing/devices free from skin contact  Intervention: Prevent Infection  Recent Flowsheet Documentation  Taken 7/30/2024 1608 by Kassie Tuttle RN  Infection Prevention:   cohorting utilized   environmental surveillance performed   equipment surfaces disinfected   hand hygiene promoted   personal protective equipment utilized   rest/sleep promoted   single patient room provided   visitors restricted/screened  Goal: Optimal Comfort and Wellbeing  Outcome: Progressing  Intervention: Monitor Pain and Promote Comfort  Recent Flowsheet Documentation  Taken 7/30/2024 1608 by Kassie Tuttle RN  Pain Management Interventions: medication offered but refused  Goal: Readiness for Transition of Care  Outcome: Progressing     Problem: Comorbidity Management  Goal: Blood Glucose Levels Within Targeted Range  Outcome: Progressing  Intervention: Monitor and Manage Glycemia  Recent Flowsheet Documentation  Taken 7/30/2024 1608 by Kassie Tuttle RN  Glycemic Management: blood glucose monitored  Medication Review/Management: medications reviewed  Goal: Blood Pressure in Desired Range  Outcome: Progressing  Intervention: Maintain Blood Pressure Management  Recent Flowsheet Documentation  Taken 7/30/2024 1608 by Kassie Tuttle RN  Medication Review/Management: medications reviewed     Problem: Skin or Soft Tissue Infection  Goal: Absence of Infection Signs and Symptoms  Outcome: Progressing  Intervention:  Minimize and Manage Infection Progression  Recent Flowsheet Documentation  Taken 7/30/2024 1608 by Kassie Tuttle, RN  Infection Prevention:   cohorting utilized   environmental surveillance performed   equipment surfaces disinfected   hand hygiene promoted   personal protective equipment utilized   rest/sleep promoted   single patient room provided   visitors restricted/screened  Intervention: Provide Meticulous Infection Site Care  Recent Flowsheet Documentation  Taken 7/30/2024 1608 by Kassie Tuttle, RN  Topical Inflammation Care: border recession noted

## 2024-07-30 NOTE — TELEPHONE ENCOUNTER
Duplicate encounter, please see other TE under Dr Gil from 7/30 that was routed to Dr oSto.    Marilia Matias, FELICIANO, LAT, ATC  Certified Athletic Trainer

## 2024-07-30 NOTE — PLAN OF CARE
"Goal Outcome Evaluation:      Plan of Care Reviewed With: patient    Overall Patient Progress: no change    Orientation: Alert and oriented x4  VSS. 97% on RA. afebrile.   LS: clear and equal bilaterally.   GI: Passing gas. no BM. Denies N/V.   : Adequate urine output.   Skin: puncture wound to top of right hand, scabbed. 2+ edema to right hand and arm, redness outlined, no change overnight.   Activity: Independent. Pt slept comfortably once in bed around 0230.   Pain: 8/10 right hand pain. Well controlled with oral pain medication. Tylenol x2. Hand elevated on pillows. Ice to hand/arm for comfort.   Updates/Plan: Pt went down this AM for MRI, plan pending results. Pt remains NPO since midnight for possible procedure pending MRI results. Continue with current cares.     0615- Pt states feels like hand and palm of hand have increased edema, more painful this AM. Patient headed down for MRI. Plan for encouraging hand being elevated, ice when returns.           Problem: Adult Inpatient Plan of Care  Goal: Plan of Care Review  Description: The Plan of Care Review/Shift note should be completed every shift.  The Outcome Evaluation is a brief statement about your assessment that the patient is improving, declining, or no change.  This information will be displayed automatically on your shift  note.  Outcome: Progressing  Flowsheets (Taken 7/30/2024 0416)  Plan of Care Reviewed With: patient  Overall Patient Progress: no change  Goal: Patient-Specific Goal (Individualized)  Description: You can add care plan individualizations to a care plan. Examples of Individualization might be:  \"Parent requests to be called daily at 9am for status\", \"I have a hard time hearing out of my right ear\", or \"Do not touch me to wake me up as it startles  me\".  Outcome: Progressing  Goal: Absence of Hospital-Acquired Illness or Injury  Outcome: Progressing  Intervention: Identify and Manage Fall Risk  Recent Flowsheet Documentation  Taken " 7/29/2024 2313 by Alethea Brown, RN  Safety Promotion/Fall Prevention:   clutter free environment maintained   nonskid shoes/slippers when out of bed   patient and family education   room near nurse's station   room organization consistent   safety round/check completed   treat reversible contributory factors   treat underlying cause  Intervention: Prevent Skin Injury  Recent Flowsheet Documentation  Taken 7/29/2024 2313 by Alethea Brown, RN  Body Position: position changed independently  Skin Protection: adhesive use limited  Device Skin Pressure Protection:   adhesive use limited   tubing/devices free from skin contact  Intervention: Prevent Infection  Recent Flowsheet Documentation  Taken 7/29/2024 2313 by Alethea Brown, GINGER  Infection Prevention:   rest/sleep promoted   hand hygiene promoted  Goal: Optimal Comfort and Wellbeing  Outcome: Progressing  Intervention: Monitor Pain and Promote Comfort  Recent Flowsheet Documentation  Taken 7/29/2024 2313 by Alethea Brown, RN  Pain Management Interventions:   medication (see MAR)   cold applied  Goal: Readiness for Transition of Care  Outcome: Progressing     Problem: Comorbidity Management  Goal: Blood Glucose Levels Within Targeted Range  Outcome: Progressing  Intervention: Monitor and Manage Glycemia  Recent Flowsheet Documentation  Taken 7/29/2024 2313 by Alethea Brown, GINGER  Glycemic Management: blood glucose monitored  Medication Review/Management:   medications reviewed   high-risk medications identified  Goal: Blood Pressure in Desired Range  Outcome: Progressing  Intervention: Maintain Blood Pressure Management  Recent Flowsheet Documentation  Taken 7/29/2024 2313 by Alethea Brown, RN  Medication Review/Management:   medications reviewed   high-risk medications identified     Problem: Skin or Soft Tissue Infection  Goal: Absence of Infection Signs and Symptoms  Outcome: Progressing  Intervention: Minimize and  Manage Infection Progression  Recent Flowsheet Documentation  Taken 7/29/2024 2313 by Alethea Brown, RN  Infection Prevention:   rest/sleep promoted   hand hygiene promoted  Infection Management: aseptic technique maintained  Intervention: Provide Meticulous Infection Site Care  Recent Flowsheet Documentation  Taken 7/29/2024 2313 by Alethea Brown, RN  Topical Inflammation Care: border marked

## 2024-07-30 NOTE — ANESTHESIA PREPROCEDURE EVALUATION
Anesthesia Pre-Procedure Evaluation    Patient: Crispin Rojas   MRN: [id number] : 1960        Procedure : Procedure(s):  IRRIGATION AND DEBRIDEMENT RIGHT HAND          Past Medical History:   Diagnosis Date    Antiplatelet or antithrombotic long-term use     Ascending aorta dilatation (H24)     Cerebral artery occlusion with cerebral infarction (H)     Cerebral infarction (H)         Gastroesophageal reflux disease with esophagitis     H/O blood clots     Hyperlipidemia LDL goal <70     Hypertension     Nonalcoholic steatohepatitis 2022    Numbness and tingling     Obesity     GILA (obstructive sleep apnea) 10/22/2018    CPAP intolerant    PVD (peripheral vascular disease) (H24)     s/p left partial toe amputation    Renal stone     Tremor     Type 2 diabetes mellitus with diabetic polyneuropathy, with long-term current use of insulin (H)       Past Surgical History:   Procedure Laterality Date    AMPUTATE FOOT Left 2016    Procedure: AMPUTATE FOOT;  Surgeon: Jack Younger DPM;  Location: [facility]    AMPUTATE TOE(S) Right 2016    Procedure: AMPUTATE TOE(S);  Surgeon: Rachelle Manriquez DPM, Pod;  Location: [facility]    AMPUTATE TOE(S) Right 2019    Procedure: Right fourth toe partial amputation for treatment of osteomyelitis.;  Surgeon: Jack Younger DPM;  Location: [facility]    AMPUTATE TOE(S) Left 2019    Procedure: Left second toe amputation at the metatarsophalangeal joint.;  Surgeon: Rachelle Manriquez DPM, Podiatry/Foot and Ankle Surgery;  Location: [facility]    AMPUTATE TOE(S) Right 2022    Procedure: AMPUTATION OF RIGHT GREAT TOE;  Surgeon: Wili Quiles DPM;  Location: [facility]    AMPUTATE TOE(S) Right 2022    Procedure: Right foot partial first metatarsal resection;  Surgeon: Tiny Soto DPM;  Location: [facility]    AMPUTATE TOE(S) Left 2024    Procedure: Left foot transmetatarsal amputation;  Surgeon: Tiny Soto DPM;  Location: [facility]     ANGIOGRAM      BIOPSY BONE FOOT Right 7/24/2022    Procedure: Bone biopsy, right first metatarsal.;  Surgeon: Valentino Molina DPM;  Location: [facility]    COLONOSCOPY      COMBINED CYSTOSCOPY, RETROGRADES, URETEROSCOPY, INSERT STENT Left 8/21/2016    Procedure: COMBINED CYSTOSCOPY, RETROGRADES, URETEROSCOPY, INSERT STENT;  Surgeon: Artemio Valenzuela MD;  Location: [facility]    COMBINED CYSTOSCOPY, RETROGRADES, URETEROSCOPY, LASER HOLMIUM LITHOTRIPSY URETER(S), INSERT STENT Left 10/3/2016    Procedure: COMBINED CYSTOSCOPY, RETROGRADES, URETEROSCOPY, LASER HOLMIUM LITHOTRIPSY URETER(S), INSERT STENT;  Surgeon: Artemio Valenzuela MD;  Location: [facility]    EXTRACORPOREAL SHOCK WAVE LITHOTRIPSY (ESWL) Left 9/8/2016    Procedure: EXTRACORPOREAL SHOCK WAVE LITHOTRIPSY (ESWL);  Surgeon: Artemio Valenzuela MD;  Location: [facility]    INCISION AND DRAINAGE FOOT, COMBINED Right 7/24/2022    Procedure: Incision and drainage, right foot ;  Surgeon: Valentino Molina DPM;  Location: [facility]    IRRIGATION AND DEBRIDEMENT FOOT, COMBINED Left 10/19/2023    Procedure: Left foot second metatarsal resection , . Left plantar ulcer excisional debridement, down to and including tendon, with closure, site measuring 4 cm x 2 cm x 1 cm;  Surgeon: Tiny Soto DPM;  Location: [facility]    OSTEOTOMY FOOT Right 11/30/2022    Procedure: 1. Right second metatarsal floating osteotomy  2. Right second digit proximal phalanx arthroplasty 3. Right percutaneous flexor tenotomy;  Surgeon: Tiny Soto DPM;  Location: [facility]    OSTEOTOMY FOOT Right 1/19/2023    Procedure: Right foot third metatarsal head excision, ulcer debridement, matatarsal osteotomies;  Surgeon: Tiny Soto DPM;  Location: [facility]    RECESSION GASTROCNEMIUS Right 2/1/2016    Procedure: RECESSION GASTROCNEMIUS;  Surgeon: Rachelle Manriquez DPM, Pod;  Location: [facility]      Allergies   Allergen Reactions    Statins Swelling     Other reaction(s): Other (see  comments)  SIMCOR caused edema in extremities  Only Simvastatin    Bactrim [Sulfamethoxazole-Trimethoprim] Nausea and Vomiting    Sulfatolamide Nausea and Vomiting    Niacin Swelling     SIMCOR caused edema in extremities    Simvastatin Swelling     SIMCOR caused edema in extremities    Sulfa Antibiotics Nausea      Social History     Tobacco Use    Smoking status: Never    Smokeless tobacco: Never   Substance Use Topics    Alcohol use: Yes     Comment: rare---red wine 3x per month      Wt Readings from Last 1 Encounters:   07/28/24 110.7 kg (244 lb)        Anesthesia Evaluation            ROS/MED HX  ENT/Pulmonary:     (+) sleep apnea, doesn't use CPAP,                                      Neurologic:     (+)          CVA,                      Cardiovascular:     (+) Dyslipidemia hypertension- Peripheral Vascular Disease-   -  - -   Taking blood thinners                                   METS/Exercise Tolerance:     Hematologic:       Musculoskeletal:       GI/Hepatic: Comment: SNYDER    (+)             liver disease,       Renal/Genitourinary:       Endo: Comment: .Body mass index is 34.03 kg/m .      (+)  type II DM,             Obesity,       Psychiatric/Substance Use:       Infectious Disease:       Malignancy:       Other:            Physical Exam    Airway        Mallampati: II    Neck ROM: full     Respiratory Devices and Support         Dental           Cardiovascular   cardiovascular exam normal       Rhythm and rate: regular     Pulmonary   pulmonary exam normal                OUTSIDE LABS:  CBC:   Lab Results   Component Value Date    WBC 7.6 07/29/2024    WBC 9.0 07/28/2024    HGB 10.5 (L) 07/29/2024    HGB 11.4 (L) 07/28/2024    HCT 33.2 (L) 07/29/2024    HCT 35.3 (L) 07/28/2024     (L) 07/29/2024     07/28/2024     BMP:   Lab Results   Component Value Date     07/29/2024     07/28/2024    POTASSIUM 4.0 07/29/2024    POTASSIUM 4.3 07/28/2024    CHLORIDE 103 07/29/2024     CHLORIDE 102 07/28/2024    CO2 23 07/29/2024    CO2 20 (L) 07/28/2024    BUN 17.1 07/29/2024    BUN 25.2 (H) 07/28/2024    CR 1.06 07/29/2024    CR 1.17 07/28/2024     (H) 07/30/2024     (H) 07/30/2024     COAGS:   Lab Results   Component Value Date    PTT 39 (H) 07/28/2022    INR 1.08 11/07/2019     POC:   Lab Results   Component Value Date     (H) 05/10/2021     HEPATIC:   Lab Results   Component Value Date    ALBUMIN 4.2 01/31/2024    PROTTOTAL 7.7 01/31/2024    ALT 67 01/31/2024    AST 57 (H) 01/31/2024    ALKPHOS 61 01/31/2024    BILITOTAL 0.3 01/31/2024     OTHER:   Lab Results   Component Value Date    LACT 1.7 07/28/2024    A1C 6.8 (H) 07/05/2024    NARA 8.5 (L) 07/29/2024    MAG 2.1 08/02/2019    LIPASE 156 07/22/2022    TSH 1.18 07/23/2022    CRP 70.9 (H) 07/22/2022    SED 24 (H) 07/28/2024       Anesthesia Plan    ASA Status:  3       Anesthesia Type: General.   Induction: Intravenous, Propofol.   Maintenance: Balanced.        Consents    Anesthesia Plan(s) and associated risks, benefits, and realistic alternatives discussed. Questions answered and patient/representative(s) expressed understanding.     - Discussed:     - Discussed with:  Patient            Postoperative Care    Pain management: IV analgesics, Oral pain medications, Multi-modal analgesia.   PONV prophylaxis: Ondansetron (or other 5HT-3), Dexamethasone or Solumedrol     Comments:               Adonay Huitron,     I have reviewed the pertinent notes and labs in the chart from the past 30 days and (re)examined the patient.  Any updates or changes from those notes are reflected in this note.            # Drug Induced Coagulation Defect: home medication list includes an anticoagulant medication  # Thrombocytopenia: Lowest platelets = 120 in last 2 days, will monitor for bleeding  # DMII: A1C = 6.8 % (Ref range: 0.0 - 5.6 %) within past 6 months  # Obesity: Estimated body mass index is 34.03 kg/m  as calculated from the  "following:    Height as of this encounter: 1.803 m (5' 11\").    Weight as of this encounter: 110.7 kg (244 lb).      "

## 2024-07-31 LAB
CRP SERPL-MCNC: 130.43 MG/L
GLUCOSE BLDC GLUCOMTR-MCNC: 181 MG/DL (ref 70–99)
GLUCOSE BLDC GLUCOMTR-MCNC: 200 MG/DL (ref 70–99)
GLUCOSE BLDC GLUCOMTR-MCNC: 242 MG/DL (ref 70–99)
GLUCOSE BLDC GLUCOMTR-MCNC: 398 MG/DL (ref 70–99)
GLUCOSE SERPL-MCNC: 419 MG/DL (ref 70–99)

## 2024-07-31 PROCEDURE — 250N000013 HC RX MED GY IP 250 OP 250 PS 637: Performed by: INTERNAL MEDICINE

## 2024-07-31 PROCEDURE — 86140 C-REACTIVE PROTEIN: CPT | Performed by: PHYSICIAN ASSISTANT

## 2024-07-31 PROCEDURE — 250N000011 HC RX IP 250 OP 636: Performed by: HOSPITALIST

## 2024-07-31 PROCEDURE — 82947 ASSAY GLUCOSE BLOOD QUANT: CPT | Performed by: INTERNAL MEDICINE

## 2024-07-31 PROCEDURE — 36415 COLL VENOUS BLD VENIPUNCTURE: CPT | Performed by: PHYSICIAN ASSISTANT

## 2024-07-31 PROCEDURE — 250N000012 HC RX MED GY IP 250 OP 636 PS 637: Performed by: INTERNAL MEDICINE

## 2024-07-31 PROCEDURE — 120N000004 HC R&B MS OVERFLOW

## 2024-07-31 PROCEDURE — 250N000011 HC RX IP 250 OP 636: Performed by: INTERNAL MEDICINE

## 2024-07-31 PROCEDURE — 99232 SBSQ HOSP IP/OBS MODERATE 35: CPT | Performed by: INTERNAL MEDICINE

## 2024-07-31 RX ORDER — METFORMIN HCL 500 MG
1000 TABLET, EXTENDED RELEASE 24 HR ORAL 2 TIMES DAILY
Status: DISCONTINUED | OUTPATIENT
Start: 2024-07-31 | End: 2024-08-02 | Stop reason: HOSPADM

## 2024-07-31 RX ORDER — ACETAMINOPHEN 325 MG/1
975 TABLET ORAL EVERY 6 HOURS PRN
Qty: 100 TABLET | Refills: 0 | Status: SHIPPED | OUTPATIENT
Start: 2024-07-31

## 2024-07-31 RX ORDER — AMOXICILLIN 250 MG
1 CAPSULE ORAL 2 TIMES DAILY PRN
Qty: 30 TABLET | Refills: 0 | Status: ON HOLD | OUTPATIENT
Start: 2024-07-31 | End: 2024-09-04

## 2024-07-31 RX ADMIN — INSULIN DEGLUDEC 35 UNITS: 100 INJECTION, SOLUTION SUBCUTANEOUS at 09:21

## 2024-07-31 RX ADMIN — INSULIN ASPART 3 UNITS: 100 INJECTION, SOLUTION INTRAVENOUS; SUBCUTANEOUS at 14:50

## 2024-07-31 RX ADMIN — INSULIN ASPART 1 UNITS: 100 INJECTION, SOLUTION INTRAVENOUS; SUBCUTANEOUS at 19:05

## 2024-07-31 RX ADMIN — ENOXAPARIN SODIUM 105 MG: 120 INJECTION SUBCUTANEOUS at 17:47

## 2024-07-31 RX ADMIN — AMPICILLIN SODIUM AND SULBACTAM SODIUM 3 G: 2; 1 INJECTION, POWDER, FOR SOLUTION INTRAMUSCULAR; INTRAVENOUS at 22:43

## 2024-07-31 RX ADMIN — PANTOPRAZOLE SODIUM 40 MG: 40 TABLET, DELAYED RELEASE ORAL at 09:09

## 2024-07-31 RX ADMIN — AMPICILLIN SODIUM AND SULBACTAM SODIUM 3 G: 2; 1 INJECTION, POWDER, FOR SOLUTION INTRAMUSCULAR; INTRAVENOUS at 05:19

## 2024-07-31 RX ADMIN — PROPRANOLOL HYDROCHLORIDE 20 MG: 20 TABLET ORAL at 09:09

## 2024-07-31 RX ADMIN — METFORMIN HYDROCHLORIDE 1000 MG: 500 TABLET, EXTENDED RELEASE ORAL at 11:04

## 2024-07-31 RX ADMIN — AMPICILLIN SODIUM AND SULBACTAM SODIUM 3 G: 2; 1 INJECTION, POWDER, FOR SOLUTION INTRAMUSCULAR; INTRAVENOUS at 11:01

## 2024-07-31 RX ADMIN — PROPRANOLOL HYDROCHLORIDE 20 MG: 20 TABLET ORAL at 20:16

## 2024-07-31 RX ADMIN — PANTOPRAZOLE SODIUM 40 MG: 40 TABLET, DELAYED RELEASE ORAL at 20:16

## 2024-07-31 RX ADMIN — AMLODIPINE BESYLATE 10 MG: 10 TABLET ORAL at 09:09

## 2024-07-31 RX ADMIN — INSULIN ASPART 6 UNITS: 100 INJECTION, SOLUTION INTRAVENOUS; SUBCUTANEOUS at 09:16

## 2024-07-31 RX ADMIN — ATORVASTATIN CALCIUM 40 MG: 20 TABLET, FILM COATED ORAL at 22:42

## 2024-07-31 RX ADMIN — GABAPENTIN 400 MG: 400 CAPSULE ORAL at 20:16

## 2024-07-31 RX ADMIN — ENOXAPARIN SODIUM 105 MG: 120 INJECTION SUBCUTANEOUS at 05:22

## 2024-07-31 RX ADMIN — GABAPENTIN 400 MG: 400 CAPSULE ORAL at 14:51

## 2024-07-31 RX ADMIN — METFORMIN HYDROCHLORIDE 1000 MG: 500 TABLET, EXTENDED RELEASE ORAL at 20:16

## 2024-07-31 RX ADMIN — AMPICILLIN SODIUM AND SULBACTAM SODIUM 3 G: 2; 1 INJECTION, POWDER, FOR SOLUTION INTRAMUSCULAR; INTRAVENOUS at 17:50

## 2024-07-31 RX ADMIN — GABAPENTIN 400 MG: 400 CAPSULE ORAL at 09:09

## 2024-07-31 ASSESSMENT — ACTIVITIES OF DAILY LIVING (ADL)
ADLS_ACUITY_SCORE: 21
DEPENDENT_IADLS:: INDEPENDENT
ADLS_ACUITY_SCORE: 21
ADLS_ACUITY_SCORE: 20
ADLS_ACUITY_SCORE: 21
ADLS_ACUITY_SCORE: 20
ADLS_ACUITY_SCORE: 21

## 2024-07-31 NOTE — PLAN OF CARE
"Goal Outcome Evaluation:      Plan of Care Reviewed With: patient    Overall Patient Progress: improving          VS: Afebrile. On 2-4 L O2 via NC to maintain sats while asleep. Other VSS.   Respiratory: WDL; lung sounds clear.   GI: Denies nausea. Tolerating regular diet. Not passing gas. Bedtime ; pt refused insulin.   : Voiding.   Peripheral Neurovascular: Denies numbness and tingling in RUE; Able to wiggle fingers. R brachial pulse 2+. Cap refill less than 3 seconds. Mild edema above RUE dressing. Baseline numbness/tingling in both lower extremities.   Skin: RUE dressing clean, dry, intact.   Activity: SBA.   Pain: Rating pain 0-6. PRN Oxy x1 given.   Lines: L PIV SL.   Plan: Pain management. IV antibiotics. Monitor peripheral neurovascular status.               Problem: Adult Inpatient Plan of Care  Goal: Plan of Care Review  Description: The Plan of Care Review/Shift note should be completed every shift.  The Outcome Evaluation is a brief statement about your assessment that the patient is improving, declining, or no change.  This information will be displayed automatically on your shift  note.  Outcome: Progressing  Flowsheets (Taken 7/31/2024 0973)  Plan of Care Reviewed With: patient  Overall Patient Progress: improving  Goal: Patient-Specific Goal (Individualized)  Description: You can add care plan individualizations to a care plan. Examples of Individualization might be:  \"Parent requests to be called daily at 9am for status\", \"I have a hard time hearing out of my right ear\", or \"Do not touch me to wake me up as it startles  me\".  Outcome: Progressing  Goal: Absence of Hospital-Acquired Illness or Injury  Outcome: Progressing  Intervention: Identify and Manage Fall Risk  Recent Flowsheet Documentation  Taken 7/30/2024 2052 by Rabia Barrera RN  Safety Promotion/Fall Prevention:   activity supervised   assistive device/personal items within reach   clutter free environment maintained   lighting " adjusted   nonskid shoes/slippers when out of bed   patient and family education   room near nurse's station   room organization consistent   safety round/check completed  Intervention: Prevent Skin Injury  Recent Flowsheet Documentation  Taken 7/31/2024 0245 by Rabia Barrera RN  Body Position: supine, head elevated  Taken 7/30/2024 2230 by Rabia Barrera RN  Body Position: supine, head elevated  Taken 7/30/2024 2052 by Rabia Barrera RN  Body Position: supine, head elevated  Skin Protection: adhesive use limited  Device Skin Pressure Protection: adhesive use limited  Intervention: Prevent and Manage VTE (Venous Thromboembolism) Risk  Recent Flowsheet Documentation  Taken 7/30/2024 2052 by Rabia Barrera RN  VTE Prevention/Management: (Ambulatory) SCDs off (sequential compression devices)  Intervention: Prevent Infection  Recent Flowsheet Documentation  Taken 7/30/2024 2052 by Rabia Barrera RN  Infection Prevention:   environmental surveillance performed   equipment surfaces disinfected   hand hygiene promoted   personal protective equipment utilized   rest/sleep promoted   single patient room provided  Goal: Optimal Comfort and Wellbeing  Outcome: Progressing  Intervention: Monitor Pain and Promote Comfort  Recent Flowsheet Documentation  Taken 7/30/2024 2230 by Rabia Barrera RN  Pain Management Interventions:   care clustered   emotional support   rest   quiet environment facilitated  Taken 7/30/2024 2130 by Rabia Barrera RN  Pain Management Interventions:   medication (see MAR)   care clustered   distraction   emotional support   rest   repositioned   quiet environment facilitated   food  Taken 7/30/2024 2052 by Rabia Barrera RN  Pain Management Interventions:   care clustered   emotional support   distraction   rest   repositioned  Goal: Readiness for Transition of Care  Outcome: Progressing     Problem: Comorbidity Management  Goal: Blood Glucose Levels Within Targeted Range  Outcome:  Progressing  Intervention: Monitor and Manage Glycemia  Recent Flowsheet Documentation  Taken 7/30/2024 2052 by Rabia Barrera RN  Glycemic Management: blood glucose monitored  Medication Review/Management:   medications reviewed   high-risk medications identified  Goal: Blood Pressure in Desired Range  Outcome: Progressing  Intervention: Maintain Blood Pressure Management  Recent Flowsheet Documentation  Taken 7/30/2024 2052 by Rabia Barrera RN  Medication Review/Management:   medications reviewed   high-risk medications identified     Problem: Skin or Soft Tissue Infection  Goal: Absence of Infection Signs and Symptoms  Outcome: Progressing  Intervention: Minimize and Manage Infection Progression  Recent Flowsheet Documentation  Taken 7/30/2024 2052 by Rabia Barrera RN  Infection Prevention:   environmental surveillance performed   equipment surfaces disinfected   hand hygiene promoted   personal protective equipment utilized   rest/sleep promoted   single patient room provided  Intervention: Provide Meticulous Infection Site Care  Recent Flowsheet Documentation  Taken 7/30/2024 2052 by Rabia Barrera RN  Topical Inflammation Care: border recession noted

## 2024-07-31 NOTE — ANESTHESIA POSTPROCEDURE EVALUATION
Patient: Crispin Rojas    Procedure: Procedure(s):  IRRIGATION AND DEBRIDEMENT RIGHT HAND       Anesthesia Type:  General    Note:  Disposition: Inpatient   Postop Pain Control: Uneventful            Sign Out: Well controlled pain   PONV: No   Neuro/Psych: Uneventful            Sign Out: Acceptable/Baseline neuro status   Airway/Respiratory: Uneventful            Sign Out: Acceptable/Baseline resp. status   CV/Hemodynamics: Uneventful            Sign Out: Acceptable CV status; No obvious hypovolemia; No obvious fluid overload   Other NRE: NONE   DID A NON-ROUTINE EVENT OCCUR? No           Last vitals:  Vitals Value Taken Time   /69 07/30/24 2015   Temp 98.5  F (36.9  C) 07/30/24 2000   Pulse 67 07/30/24 2021   Resp 21 07/30/24 2021   SpO2 93 % 07/30/24 2021   Vitals shown include unfiled device data.    Electronically Signed By: Jack Reinoso MD  July 30, 2024  8:36 PM

## 2024-07-31 NOTE — ANESTHESIA CARE TRANSFER NOTE
Patient: Crispin Rojas    Procedure: Procedure(s):  IRRIGATION AND DEBRIDEMENT RIGHT HAND       Diagnosis: Dog bite of hand, right, initial encounter [S61.451A, W54.0XXA]  Diagnosis Additional Information: No value filed.    Anesthesia Type:   General     Note:    Oropharynx: oral airway in place  Level of Consciousness: drowsy  Oxygen Supplementation: face mask    Independent Airway: airway patency satisfactory and stable  Dentition: dentition unchanged  Vital Signs Stable: post-procedure vital signs reviewed and stable  Report to RN Given: handoff report given  Patient transferred to: PACU    Handoff Report: Identifed the Patient, Identified the Reponsible Provider, Reviewed the pertinent medical history, Discussed the surgical course, Reviewed Intra-OP anesthesia mangement and issues during anesthesia, Set expectations for post-procedure period and Allowed opportunity for questions and acknowledgement of understanding      Vitals:  Vitals Value Taken Time   /65 07/30/24 1904   Temp     Pulse 68 07/30/24 1906   Resp 18 07/30/24 1906   SpO2 97 % 07/30/24 1906   Vitals shown include unfiled device data.    Electronically Signed By: SARAH Damon CRNA  July 30, 2024  7:07 PM

## 2024-07-31 NOTE — CONSULTS
Care Management Initial Consult    General Information  Assessment completed with: Patient,    Type of CM/SW Visit: Initial Assessment    Primary Care Provider verified and updated as needed: Yes   Readmission within the last 30 days:        Reason for Consult: other (see comments) (elevated URR)  Advance Care Planning:            Communication Assessment  Patient's communication style: spoken language (English or Bilingual)    Hearing Difficulty or Deaf: no   Wear Glasses or Blind: no    Cognitive  Cognitive/Neuro/Behavioral: WDL  Level of Consciousness: sedated                   Living Environment:   People in home: spouse     Current living Arrangements: house      Able to return to prior arrangements: yes       Family/Social Support:  Care provided by: self  Provides care for: no one  Marital Status:              Description of Support System:           Current Resources:   Patient receiving home care services: No     Community Resources: None  Equipment currently used at home: none  Supplies currently used at home:      Employment/Financial:  Employment Status:          Financial Concerns:             Does the patient's insurance plan have a 3 day qualifying hospital stay waiver?  Yes     Which insurance plan 3 day waiver is available? Alternative insurance waiver    Will the waiver be used for post-acute placement? No    Lifestyle & Psychosocial Needs:  Social Determinants of Health     Food Insecurity: Low Risk  (7/5/2024)    Food Insecurity     Within the past 12 months, did you worry that your food would run out before you got money to buy more?: No     Within the past 12 months, did the food you bought just not last and you didn t have money to get more?: No   Depression: Not at risk (3/25/2024)    PHQ-2     PHQ-2 Score: 0   Housing Stability: Low Risk  (7/5/2024)    Housing Stability     Do you have housing? : Yes     Are you worried about losing your housing?: No   Tobacco Use: Low Risk   (7/10/2024)    Patient History     Smoking Tobacco Use: Never     Smokeless Tobacco Use: Never     Passive Exposure: Not on file   Financial Resource Strain: Low Risk  (7/5/2024)    Financial Resource Strain     Within the past 12 months, have you or your family members you live with been unable to get utilities (heat, electricity) when it was really needed?: No   Alcohol Use: Not At Risk (10/18/2023)    AUDIT-C     Frequency of Alcohol Consumption: Monthly or less     Average Number of Drinks: 1 or 2     Frequency of Binge Drinking: Never   Transportation Needs: Low Risk  (7/5/2024)    Transportation Needs     Within the past 12 months, has lack of transportation kept you from medical appointments, getting your medicines, non-medical meetings or appointments, work, or from getting things that you need?: No   Physical Activity: Sufficiently Active (7/5/2024)    Exercise Vital Sign     Days of Exercise per Week: 6 days     Minutes of Exercise per Session: 80 min   Interpersonal Safety: Low Risk  (7/5/2024)    Interpersonal Safety     Do you feel physically and emotionally safe where you currently live?: Yes     Within the past 12 months, have you been hit, slapped, kicked or otherwise physically hurt by someone?: No     Within the past 12 months, have you been humiliated or emotionally abused in other ways by your partner or ex-partner?: No   Stress: No Stress Concern Present (7/5/2024)    [place] [place] [employer] - Occupational Stress Questionnaire     Feeling of Stress : Not at all   Social Connections: Socially Integrated (7/5/2024)    Social Connection and Isolation Panel [NHANES]     Frequency of Communication with Friends and Family: Twice a week     Frequency of Social Gatherings with Friends and Family: More than three times a week     Attends Hinduism Services: More than 4 times per year     Active Member of Clubs or Organizations: Yes     Attends Club or Organization Meetings: More than 4  times per year     Marital Status:    Health Literacy: Not on file       Functional Status:  Prior to admission patient needed assistance:   Dependent ADLs:: Independent  Dependent IADLs:: Independent       Mental Health Status:          Chemical Dependency Status:                Values/Beliefs:  Spiritual, Cultural Beliefs, Rastafarian Practices, Values that affect care:                 Additional Information:  SW met with patient due to an elevated unplanned readmission risk score of 20%. He shared he resides in a private home with his spouse. 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 He anticipates returning home with his spouse & voiced he would only have discharge needs if IV antibiotics are needed at discharge. Patient plans to drive himself home & has his car here at the hospital. He is aware care management will continue to follow & will assist with discharge planning if needed otherwise this is RADHA's only visit. Patient denied any current needs or concerns related to discharge planning.     YESSICA Mao

## 2024-07-31 NOTE — OP NOTE
Surgeon: Dr. Catrina Mcdaniels MD.      Preoperative diagnosis: Dog bite/ infection    Post-operative diagnosis: same    Procedure: Irrigation and Debridement of Right Dorsal Hand and wrist    Anesthesia: General    Tourniquet time: 20 minutes    Informed consent: Informed consent was obtained in the holding area.    Surgical site signed: The surgical site was signed by me prior to entering the OR.    Time out: A time-out was performed confirming the surgical site prior to incision.    Prophylactic antibiotics: Unisyn    DVT indications: SCD's applied    Procedure and findings: The patient is a 64-year-old right-hand-dominant male who was bitten by a Chinese Herrmann on the dorsum of his right hand.  He was admitted on 7/28 with a dorsal puncture over the metacarpal portion of his hand and cellulitis.  He was admitted and placed on IV antibiotics and has had resolution of some of his cellulitis but continued to have significant pain in the dorsum of his hand.  Ultrasound and MRI demonstrated a fluid collection.    The patient was seen in the preop holding area his hand was signed, the consent was signed, and he was brought to the operating room.  The right arm was prepped and draped with Betadine scrub and Betadine paint; the arm was elevated but not exsanguinated and the tourniquet was inflated to 250 mm of pressure.    A longitudinal incision was made over the long finger metacarpal to include the dog bite and hayde pus oozed out of the wound.  Cultures were taken; aerobic and anaerobic.  The soft tissues were elevated from the index finger over to the small metacarpal.  A rongeur was used to remove necrotic fat.  In the area was irrigated out copiously with 4 L of antibiotic solution and normal saline.  It appeared the extent of the infection was over the dorsum of the hand but I felt it important to irrigate the fourth dorsal compartment so a tenotomy scissors and snap were placed underneath the distal edge of the  fourth dorsal compartment and the area was irrigated out copiously.  There is no hayde pus and the fourth dorsal compartment just cloudy fluid.    A vessel loop was placed in the fourth dorsal compartment to help drain, a second vessel loop was placed in the dorsal index radial corner this thin skin was tacked closed with a 4-0 nylon and a sterile dressing was applied.  His Vesseloops are encased in a ABD pad at the proximal dorsal aspect of his dressing.  The vessels loops should be pulled on Thursday morning and antibiotic coverage determined by cultures.

## 2024-07-31 NOTE — PROGRESS NOTES
Orthopedic Surgery  Crispin Rojas  07/31/2024     Admit Date:  7/28/2024    POD: 1 Day Post-Op   Procedure(s):  Irrigation and Debridement of Right Dorsal Hand and wrist    Patient resting comfortably in bed, up ad tasia. On room air. Doing well after surgery.   Pain controlled.  Tolerating oral intake.    Denies nausea or vomiting.  Denies chest pain or shortness of breath.  No acute events overnight.     Temp:  [97.4  F (36.3  C)-99.4  F (37.4  C)] 98.1  F (36.7  C)  Pulse:  [61-88] 71  Resp:  [12-22] 16  BP: (117-172)/(56-83) 139/75  SpO2:  [88 %-100 %] 92 %    Alert and oriented.   Dressing is clean, dry, and intact.   Minimal erythema of the surrounding skin.   Right upper extremity is NVI.  Able to flex, extend, abduct, and adduct digits.  Digits are warm and well-perfused.  Sensation intact to light touch.  Forearm pain improving, mild swelling present.   Able to fully range the right elbow.     Labs:  Recent Labs   Lab Test 07/29/24  0652 07/28/24  0501 07/05/24  1157   WBC 7.6 9.0 6.3   HGB 10.5* 11.4* 11.5*   * 154 194     Recent Labs   Lab Test 11/07/19  1817   INR 1.08     Recent Labs   Lab Test 07/22/22  1705 06/03/22  1203 11/07/19  1817   CRP 70.9* 8.5* <2.9       A/P    1.   Currently on Lovenox, Ok to resume PTA Eliquis for DVT prophylaxis. Defer to medicine for management.     WBAT   Continue current pain regimen   Dressings: Keep intact.  Contact surgical team with any concerns. Vessel loops to be pulled tomorrow morning.    Follow-up: 1 week post-op with Dr. Mcdaniels     2. Disposition   Anticipate d/c to home tomorrow morning.       Stephanie Rodarte PA-C  [facility]

## 2024-07-31 NOTE — PROGRESS NOTES
Hospitalist Medicine Progress Note   [facility]       Crispin Rojas is a 64 year old gentleman with DVT on apixaban, CVA, essential hypertension, hypercholesteremia, T2DM, peripheral vascular disease, obstructive sleep apnea came to [facility] 7/28/2024 after a dog bite 5 days prior to admission while petting an unknown dog that belonged to his neighbor and having swelling and pain of the right hand unable to make a fist due to pain, fevers WBC of 9.0 was started on Unasyn 7/28/2024 was evaluated by orthopedic surgery who did not think that the patient has tenosynovitis and to continue the antibiotic management with elevation of the right hand.  Splint was not thought to be needed he had MRI scan of the right hand which showed fluid collection versus abscess for which patient was taken to the operating room 7/30/2024 with irrigation and debridement of the right ulcer hand and wrist with drainage of hayde pus the culture of which was done and at this time is growing gram-positive cocci.  Patient is on treatment with Unasyn.  Plan is to discharge home when cultures are back       Date of Admission:  7/28/2024  Assessment & Plan     Right hand dog bite  Right hand cellulitis  Patient came in with right hand pain, swelling, fevers after dog bite 5-day prior to admission from an unknown dog in the neighborhood  Patient's CRP increased from 20 to 171 on Unasyn though the white count has somewhat decreased from 9 to 7.6  Vaccination was given 2022  There is no growth in the blood culture drawn 7/28/2024  Ultrasound right hand was ordered by orthopedic surgery 7/29/2024 and patient had drainage of abscess from the right hand and 1/30/2024 the cultures are sent and is growing gram-positive cocci    DM2  -Continue degludec 35 units daily  -Medium intensity sliding scale insulin -she does not want to take this but rather want to take what he takes at home 1 unit of short acting insulin  for 3 g of carbs in other words 5 units of insulin for 15 grams of carbohydrate  -Will restart metformin  -pt is also on scheduled aspart 9 units/gm carb PTA     Recurrent DVT  -Holding apixaban until orthopedics eval  -Last took 0100 on 07/28     History of CVA  -Continue statin  -Restart apixaban when able     HTN  -Continue amlodipine, propranolol  -Hold HCTZ and lisinopril     HLD  -Continue atorvastatin 40 mg daily     Pain/neuropathy   -continue gabapentin     Obesity  -Will require extra nursing resources routine cares          Plan:   We sugars related daily higher will restart metformin and continue long-acting insulin as well as sliding scale insulin      Diet: Advance Diet as Tolerated: Regular Diet Adult    DVT Prophylaxis: Enoxaparin (Lovenox) SQ  Mccarthy Catheter: Not present  Code Status: Full Code               The patient's care was discussed with the Patient and RN.    Uli Drummond MD  Hospitalist Service  [facility]    ______________________________________________________________________    Interval History     Symptoms   Patient's right hand is not painful after surgery    Review of Systems:   Denies any nausea vomiting or fever    Data reviewed today: I reviewed all medications, new labs and imaging results over the last 24 hours.     Physical Exam   Vital Signs: Temp: 98.1  F (36.7  C) Temp src: Oral BP: 139/75 Pulse: 71   Resp: 16 SpO2: 92 % O2 Device: None (Room air) Oxygen Delivery: 4 LPM  Weight: 244 lbs 0 oz      GENERAL: Patient is not in acute distress  HEENT: EOM+,Conjunctiva is clear   NECK:  no Jugular Venous distention  HEART: S1 S2 regular Rate and Rhythm, there is  no murmur,   LUNGS: Respirations are  not laboured, Lungs are  clear to auscultation without Crepitations or Wheezing   ABDOMEN: Soft , there is no tenderness , Bowel Sounds are  Positive   LOWER LIMBS: no  Pedal Edema  Bilaterally   CNS:  Alert,  Oriented x 3, Moving all the Four Limbs   RIGHT  UPPER LIMB : Right hand was in a dressing which was not open today    Data   Recent Labs   Lab 07/30/24  2249 07/30/24  1927 07/30/24  1706 07/29/24  0937 07/29/24  0652 07/28/24  0911 07/28/24  0501   WBC  --   --   --   --  7.6  --  9.0   HGB  --   --   --   --  10.5*  --  11.4*   MCV  --   --   --   --  88  --  89   PLT  --   --   --   --  120*  --  154   NA  --   --   --   --  136  --  137   POTASSIUM  --   --   --   --  4.0  --  4.3   CHLORIDE  --   --   --   --  103  --  102   CO2  --   --   --   --  23  --  20*   BUN  --   --   --   --  17.1  --  25.2*   CR  --   --   --   --  1.06  --  1.17   ANIONGAP  --   --   --   --  10  --  15   NARA  --   --   --   --  8.5*  --  9.4   * 96 104*   < > 186*   < > 98    < > = values in this interval not displayed.         No results found for this or any previous visit (from the past 24 hour(s)).

## 2024-07-31 NOTE — PLAN OF CARE
Goal Outcome Evaluation:      Plan of Care Reviewed With: patient    Overall Patient Progress: improvingOverall Patient Progress: improving       VSS.  Continues to receive IV unasyn.  Blood glucose checks remain elevated; pt is agreeable to treating with insulin today.  Ace wrap and splint C/D/I.  Would cultures growing gram positive cocci; awaiting antibiotic plan for home.  Up independently in room.  Ice and elevation to RUE.  Denies need for pain medication.

## 2024-07-31 NOTE — CONSULTS
"SPIRITUAL HEALTH SERVICES - Consult Note  Peds (Adult Patient)    Referral Source/ Reason for Visit: Staff consult for emotional support.    Summary and Recommendations -     Reflectively listened to patient talk about a small bite from a neighborhood dog that eventually caused swelling in his arm and other hospital problems.    Plan: Patient hopes to discharge to home 8/1/24.  No spiritual needs.  [facility] remains available.    Rev. AMADA Jacome.  Staff     [facility]S available 24/7 for emergent requests/ referrals, either by paging the on-call  or by entering an ASAP/STAT consult in [facility], which will also page the on-call .      Assessment    Saw pt Crispin Rojas , \"Pat\" regarding staff consult request for emotional support.    Patient/Family Understanding of Illness and Goals of Care - Patient understands that he was admitted to  due to complications from a dog bite he sustained earlier in the week while on a walk in his neighborhood.    Distress and Loss - He was shocked to see the results of a dog bite from last week. He never thought it would lead to hospitalization.    Strengths, Coping and Resources - He said that golf is his evgeny and his means of relaxation.    Meaning, Beliefs and Spirituality - Patient is Baptist.  He attends [employer] in [place].          "

## 2024-08-01 LAB
ANION GAP SERPL CALCULATED.3IONS-SCNC: 14 MMOL/L (ref 7–15)
BUN SERPL-MCNC: 21.6 MG/DL (ref 8–23)
CALCIUM SERPL-MCNC: 8.5 MG/DL (ref 8.8–10.4)
CHLORIDE SERPL-SCNC: 106 MMOL/L (ref 98–107)
CREAT SERPL-MCNC: 1 MG/DL (ref 0.67–1.17)
CRP SERPL-MCNC: 56 MG/L
EGFRCR SERPLBLD CKD-EPI 2021: 85 ML/MIN/1.73M2
GLUCOSE BLDC GLUCOMTR-MCNC: 100 MG/DL (ref 70–99)
GLUCOSE BLDC GLUCOMTR-MCNC: 103 MG/DL (ref 70–99)
GLUCOSE BLDC GLUCOMTR-MCNC: 127 MG/DL (ref 70–99)
GLUCOSE BLDC GLUCOMTR-MCNC: 88 MG/DL (ref 70–99)
GLUCOSE SERPL-MCNC: 126 MG/DL (ref 70–99)
GLUCOSE SERPL-MCNC: 126 MG/DL (ref 70–99)
HCO3 SERPL-SCNC: 21 MMOL/L (ref 22–29)
POTASSIUM SERPL-SCNC: 4 MMOL/L (ref 3.4–5.3)
SODIUM SERPL-SCNC: 141 MMOL/L (ref 135–145)

## 2024-08-01 PROCEDURE — 250N000011 HC RX IP 250 OP 636: Performed by: HOSPITALIST

## 2024-08-01 PROCEDURE — 82565 ASSAY OF CREATININE: CPT | Performed by: INTERNAL MEDICINE

## 2024-08-01 PROCEDURE — 82947 ASSAY GLUCOSE BLOOD QUANT: CPT | Performed by: INTERNAL MEDICINE

## 2024-08-01 PROCEDURE — 250N000013 HC RX MED GY IP 250 OP 250 PS 637: Performed by: INTERNAL MEDICINE

## 2024-08-01 PROCEDURE — 99232 SBSQ HOSP IP/OBS MODERATE 35: CPT | Performed by: INTERNAL MEDICINE

## 2024-08-01 PROCEDURE — 86140 C-REACTIVE PROTEIN: CPT | Performed by: PHYSICIAN ASSISTANT

## 2024-08-01 PROCEDURE — 36415 COLL VENOUS BLD VENIPUNCTURE: CPT | Performed by: PHYSICIAN ASSISTANT

## 2024-08-01 PROCEDURE — 120N000004 HC R&B MS OVERFLOW

## 2024-08-01 PROCEDURE — 99254 IP/OBS CNSLTJ NEW/EST MOD 60: CPT | Performed by: INTERNAL MEDICINE

## 2024-08-01 PROCEDURE — 82374 ASSAY BLOOD CARBON DIOXIDE: CPT | Performed by: INTERNAL MEDICINE

## 2024-08-01 PROCEDURE — 250N000011 HC RX IP 250 OP 636: Performed by: INTERNAL MEDICINE

## 2024-08-01 RX ORDER — LISINOPRIL 20 MG/1
20 TABLET ORAL DAILY
Status: DISCONTINUED | OUTPATIENT
Start: 2024-08-01 | End: 2024-08-02 | Stop reason: HOSPADM

## 2024-08-01 RX ADMIN — AMPICILLIN SODIUM AND SULBACTAM SODIUM 3 G: 2; 1 INJECTION, POWDER, FOR SOLUTION INTRAMUSCULAR; INTRAVENOUS at 16:45

## 2024-08-01 RX ADMIN — PROPRANOLOL HYDROCHLORIDE 20 MG: 20 TABLET ORAL at 20:21

## 2024-08-01 RX ADMIN — ACETAMINOPHEN 975 MG: 325 TABLET, FILM COATED ORAL at 04:20

## 2024-08-01 RX ADMIN — GABAPENTIN 400 MG: 400 CAPSULE ORAL at 08:53

## 2024-08-01 RX ADMIN — AMPICILLIN SODIUM AND SULBACTAM SODIUM 3 G: 2; 1 INJECTION, POWDER, FOR SOLUTION INTRAMUSCULAR; INTRAVENOUS at 04:20

## 2024-08-01 RX ADMIN — AMPICILLIN SODIUM AND SULBACTAM SODIUM 3 G: 2; 1 INJECTION, POWDER, FOR SOLUTION INTRAMUSCULAR; INTRAVENOUS at 22:43

## 2024-08-01 RX ADMIN — APIXABAN 5 MG: 5 TABLET, FILM COATED ORAL at 16:49

## 2024-08-01 RX ADMIN — ENOXAPARIN SODIUM 105 MG: 120 INJECTION SUBCUTANEOUS at 04:23

## 2024-08-01 RX ADMIN — PANTOPRAZOLE SODIUM 40 MG: 40 TABLET, DELAYED RELEASE ORAL at 08:53

## 2024-08-01 RX ADMIN — LISINOPRIL 20 MG: 20 TABLET ORAL at 16:49

## 2024-08-01 RX ADMIN — GABAPENTIN 400 MG: 400 CAPSULE ORAL at 13:24

## 2024-08-01 RX ADMIN — PROPRANOLOL HYDROCHLORIDE 20 MG: 20 TABLET ORAL at 08:53

## 2024-08-01 RX ADMIN — METFORMIN HYDROCHLORIDE 1000 MG: 500 TABLET, EXTENDED RELEASE ORAL at 20:21

## 2024-08-01 RX ADMIN — ACETAMINOPHEN 975 MG: 325 TABLET, FILM COATED ORAL at 16:49

## 2024-08-01 RX ADMIN — AMLODIPINE BESYLATE 10 MG: 10 TABLET ORAL at 08:53

## 2024-08-01 RX ADMIN — PANTOPRAZOLE SODIUM 40 MG: 40 TABLET, DELAYED RELEASE ORAL at 20:21

## 2024-08-01 RX ADMIN — AMPICILLIN SODIUM AND SULBACTAM SODIUM 3 G: 2; 1 INJECTION, POWDER, FOR SOLUTION INTRAMUSCULAR; INTRAVENOUS at 12:01

## 2024-08-01 RX ADMIN — INSULIN DEGLUDEC 35 UNITS: 100 INJECTION, SOLUTION SUBCUTANEOUS at 08:54

## 2024-08-01 RX ADMIN — METFORMIN HYDROCHLORIDE 1000 MG: 500 TABLET, EXTENDED RELEASE ORAL at 08:53

## 2024-08-01 RX ADMIN — GABAPENTIN 400 MG: 400 CAPSULE ORAL at 20:21

## 2024-08-01 RX ADMIN — ATORVASTATIN CALCIUM 40 MG: 20 TABLET, FILM COATED ORAL at 22:42

## 2024-08-01 ASSESSMENT — ACTIVITIES OF DAILY LIVING (ADL)
ADLS_ACUITY_SCORE: 20

## 2024-08-01 NOTE — PROGRESS NOTES
Orthopedic Surgery  Crispin Rojas  08/01/2024     Admit Date:  7/28/2024    POD: 2 Days Post-Op   Procedure(s):  Irrigation and Debridement of Right Dorsal Hand and wrist    Patient resting comfortably in chair. Finished breakfast.  Patient reports that he is doing fairly well since surgery. He had increased right hand pain last night, but this morning is feeling better.   Denies numbness and tingling to the right hand.  Tolerating oral intake.    Denies nausea or vomiting.  Denies chest pain or shortness of breath.  Afebrile, VSS.  Up ad tasia. Voiding without issue.   No acute events overnight.     Temp:  [97.5  F (36.4  C)-98.1  F (36.7  C)] 97.5  F (36.4  C)  Pulse:  [54-66] 54  Resp:  [16-18] 18  BP: (111-134)/(53-74) 134/74  SpO2:  [94 %-98 %] 98 %    Alert and oriented. NAD. Non-toxic appearing.  Right hand/wrist dressing is clean, dry, and intact. Dressing removed and reveals surgical site to the dorsum of the hand without active or expressible drainage. Vessel loops x 2 were removed without complication and there was no drainage from these sites. Trace surrounding erythema to the surgical site, but no further erythema. Edema significant improved to the right hand/wrist, but remains mild. Swelling to the proximal forearm noted.   Forearm compartments are soft and compressible.  Right upper extremity is NVI.  Able to flex, extend, abduct, and adduct digits. Able to complete opposition of thumb to all digits today whereas he was unable to do so since admission.  Full AROM of the right elbow.  Radial pulse palpable.  Digits are warm and well-perfused.  Sensation intact to light touch.    Labs:  Recent Labs   Lab Test 07/29/24  0652 07/28/24  0501 07/05/24  1157   WBC 7.6 9.0 6.3   HGB 10.5* 11.4* 11.5*   * 154 194     Recent Labs   Lab Test 11/07/19  1817   INR 1.08     Recent Labs   Lab Test 07/22/22  1705 06/03/22  1203 11/07/19  1817   CRP 70.9* 8.5* <2.9     All cultures:  Recent Labs   Lab  07/30/24  1832 07/28/24  0501   CULTURE No anaerobic organisms isolated after 1 day  No growth, less than 1 day  See corresponding culture for results No growth after 4 days     A/P    Plan:   Continue with PTA Eliquis. No specific need for DVT prophylaxis from an orthopedic standpoint as this is an upper extremity issue and the patient is mobilizing well.   Continue with antibiotics per medicine team (currently Unasyn). Intra-op gram stain with GPC, but aerobic and anaerobic cultures NGTD. ID consult placed for assistance with discharge antibiotic recommendations (likely PO).   No lifting >1-2 pounds with the right hand. Encourage frequent right wrist and digital ROM to prevent stiffness.    Orthotics consult placed for a cock-up wrist brace to wear as needed for comfort over his dressings. Patient advised to remove this 3-5 times a day for ROM.    Continue current pain regimen   Dressings: Vessel loops pulled today and dressings changed. Given the patient has no current drainage, advise no soaks and that he remain in his current dressings until follow up early next week. Patient should be given additional supplies to take home in the event he needs to reinforce his dressing. Cover securely for showers, absolutely no soaking.   Follow-up: 1 week post-op with Dr. Mcdaniels/Renee Hart PA-C      2. Disposition:   Okay to discharge to home today from an orthopedic standpoint as long as antibiotic regimen is determined.    Maylin Dixon PA-C  [facility]

## 2024-08-01 NOTE — PLAN OF CARE
Goal Outcome Evaluation:      Plan of Care Reviewed With: patient    Overall Patient Progress: improvingOverall Patient Progress: improving         Tylenol given for pain.  Tolerating diet.  Passing has.  Wearing splint to RUE.  Wound cultures growing strep pyogenes; provider and on call ID aware.

## 2024-08-01 NOTE — PLAN OF CARE
"Goal Outcome Evaluation:      Plan of Care Reviewed With: patient    Overall Patient Progress: improvingOverall Patient Progress: improving    VSS. Afebrile   Respiratory: LS clear and equal bilaterally   GI: Passing gas. Audible bowel sounds. Denies nausea. Tolerating reg diet  : Adequate output   Neurovascular: Denies numbness and tingling in RUE. Baseline numbness on Lower extremities. + 2 bradial pulses and cap refill less than 3 second. Mild edema on RUE above dressing.   Skin: Right hand- dressing changed c/d/I and  ace wrapped.  Activity: Up ad tasia.   Pain: 0-1/10 pain. Scheduled pain meds given.   Lines: L forearm IV Saline locked  Plan: VSS, IV abx, pain management.     Problem: Adult Inpatient Plan of Care  Goal: Plan of Care Review  Description: The Plan of Care Review/Shift note should be completed every shift.  The Outcome Evaluation is a brief statement about your assessment that the patient is improving, declining, or no change.  This information will be displayed automatically on your shift  note.  Outcome: Progressing  Flowsheets (Taken 8/1/2024 5644)  Plan of Care Reviewed With: patient  Overall Patient Progress: improving  Goal: Patient-Specific Goal (Individualized)  Description: You can add care plan individualizations to a care plan. Examples of Individualization might be:  \"Parent requests to be called daily at 9am for status\", \"I have a hard time hearing out of my right ear\", or \"Do not touch me to wake me up as it startles  me\".  Outcome: Progressing  Goal: Absence of Hospital-Acquired Illness or Injury  Outcome: Progressing  Goal: Optimal Comfort and Wellbeing  Outcome: Progressing  Goal: Readiness for Transition of Care  Outcome: Progressing         "

## 2024-08-01 NOTE — CONSULTS
[facility]    Infectious Disease Consultation     Date of Admission:  7/28/2024  Date of Consult (When I saw the patient): 08/01/24    Assessment & Plan   Crispin Rojas is a 64 year old male who was admitted on 7/28/2024.     Impression: 1 64-year-old male, very well-known to me, now admitted with a new issue with a right dorsal hand dog bite related infection, abscess present but no major tenosynovitis or deeper infection, status post I&D no major clinical sepsis, cultures pending, Gram stain with gram-positive cocci rather than the expected Pasteurella type organism  2 unknown dog bite exposure, domesticated animal being walked by neighbor so very low likelihood of rabies very likely vaccinated but unknown  3 recurrent bilateral diabetic foot infections and osteomyelitis including recent left diabetic foot infection and transmetatarsal amputation, that same foot had been on chronic antibiotics directed by us long-term for suppressing presumed osteo but now fully resected with transmetatarsal amputation  4 prior recurrent infections including right transmetatarsal amputation  5 diabetes mellitus  6 recurrent deep venous thrombosis    REC 1 Unasyn likely covering it given dramatic improvement in the cellulitis, microbiology pending, I probably favor 1 more day of Unasyn and then see what culture is showing tomorrow then likely Augmentin 875 twice daily for 10 days.  2 foot looks okay no obvious ongoing infection issue  3 very low risk rabies exposure but not 0, ample time with the distal bite to try to find the dog, if able to locate in the dog's either live or known vaccinated no intervention needed        Carlos A Sharma MD    Reason for Consult   Reason for consult: I was asked to evaluate this patient for right hand infection.    Primary Care Physician   Johann Serna    Chief Complaint   Right hand redness and pain    History is obtained from the patient and medical records    History  of Present Illness   Crispin Rojas is a 64 year old male who presents with infection at site of a right hand dog bite.  The patient had a dog bite and roughly 48 hours later started having some pain and swelling and then increasing redness.  He has been admitted IV antibiotics with some improvement but abscess present and now status post I&D.  No obvious tenosynovitis, sepsis or deeper infection.  Cultures from the hand are pending.  The dog is not known or under observation but domesticated with being walked by its owner, possibly will be able to find the dog.     Patient is well-known to us due to ongoing long-term diabetic foot infections, has been on extended impressive antibiotics for a long period of time and then left foot worsened and had to have a transmetatarsal amputation a month ago.  Was seen to a partner put on oral antibiotics that foot is done well with no signs of ongoing infection    Past Medical History   I have reviewed this patient's medical history and updated it with pertinent information if needed.   Past Medical History:   Diagnosis Date    Antiplatelet or antithrombotic long-term use     Ascending aorta dilatation (H24)     Cerebral artery occlusion with cerebral infarction (H)     Cerebral infarction (H)     2010    Gastroesophageal reflux disease with esophagitis     H/O blood clots 2022    Hyperlipidemia LDL goal <70     Hypertension     Nonalcoholic steatohepatitis 11/30/2022    Numbness and tingling     Obesity     GILA (obstructive sleep apnea) 10/22/2018    CPAP intolerant    PVD (peripheral vascular disease) (H24)     s/p left partial toe amputation    Renal stone     Tremor     Type 2 diabetes mellitus with diabetic polyneuropathy, with long-term current use of insulin (H)        Past Surgical History   I have reviewed this patient's surgical history and updated it with pertinent information if needed.  Past Surgical History:   Procedure Laterality Date    AMPUTATE FOOT Left  8/18/2016    Procedure: AMPUTATE FOOT;  Surgeon: Jack Younger DPM;  Location: [facility]    AMPUTATE TOE(S) Right 2/1/2016    Procedure: AMPUTATE TOE(S);  Surgeon: Rachelle Manriquez DPM, Pod;  Location: [facility]    AMPUTATE TOE(S) Right 8/2/2019    Procedure: Right fourth toe partial amputation for treatment of osteomyelitis.;  Surgeon: Jack Younger DPM;  Location: [facility]    AMPUTATE TOE(S) Left 11/8/2019    Procedure: Left second toe amputation at the metatarsophalangeal joint.;  Surgeon: Rachelle Manriquez DPM, Podiatry/Foot and Ankle Surgery;  Location: [facility]    AMPUTATE TOE(S) Right 6/5/2022    Procedure: AMPUTATION OF RIGHT GREAT TOE;  Surgeon: Wili Quiles DPM;  Location: [facility]    AMPUTATE TOE(S) Right 7/28/2022    Procedure: Right foot partial first metatarsal resection;  Surgeon: Tiny Soto DPM;  Location: [facility]    AMPUTATE TOE(S) Left 6/18/2024    Procedure: Left foot transmetatarsal amputation;  Surgeon: Tiny Soto DPM;  Location: [facility]    ANGIOGRAM      BIOPSY BONE FOOT Right 7/24/2022    Procedure: Bone biopsy, right first metatarsal.;  Surgeon: Valentino Molina DPM;  Location: [facility]    COLONOSCOPY      COMBINED CYSTOSCOPY, RETROGRADES, URETEROSCOPY, INSERT STENT Left 8/21/2016    Procedure: COMBINED CYSTOSCOPY, RETROGRADES, URETEROSCOPY, INSERT STENT;  Surgeon: Artemio Valenzuela MD;  Location: [facility]    COMBINED CYSTOSCOPY, RETROGRADES, URETEROSCOPY, LASER HOLMIUM LITHOTRIPSY URETER(S), INSERT STENT Left 10/3/2016    Procedure: COMBINED CYSTOSCOPY, RETROGRADES, URETEROSCOPY, LASER HOLMIUM LITHOTRIPSY URETER(S), INSERT STENT;  Surgeon: Artemio Valenzuela MD;  [facility]: [facility]    EXTRACORPOREAL SHOCK WAVE LITHOTRIPSY (ESWL) Left 9/8/2016    Procedure: EXTRACORPOREAL SHOCK WAVE LITHOTRIPSY (ESWL);  Surgeon: Artemio Valenzuela MD;  Location: [facility]    INCISION AND DRAINAGE FOOT, COMBINED Right 7/24/2022    Procedure: Incision and drainage, right foot ;  Surgeon:  Valentino Molina DPM;  [facility]: [facility]    IRRIGATION AND DEBRIDEMENT FOOT, COMBINED Left 10/19/2023    Procedure: Left foot second metatarsal resection , . Left plantar ulcer excisional debridement, down to and including tendon, with closure, site measuring 4 cm x 2 cm x 1 cm;  Surgeon: Tiny Soto DPM;  Location: [facility]    IRRIGATION AND DEBRIDEMENT HAND, COMBINED Right 7/30/2024    Procedure: Irrigation and Debridement of Right Dorsal Hand and wrist;  Surgeon: June Mcdaniels MD;  Location: [facility]    OSTEOTOMY FOOT Right 11/30/2022    Procedure: 1. Right second metatarsal floating osteotomy  2. Right second digit proximal phalanx arthroplasty 3. Right percutaneous flexor tenotomy;  Surgeon: Tiny Soto DPM;  [facility]    OSTEOTOMY FOOT Right 1/19/2023    Procedure: Right foot third metatarsal head excision, ulcer debridement, matatarsal osteotomies;  Surgeon: Tiny Soto DPM;  Location: [facility]    RECESSION GASTROCNEMIUS Right 2/1/2016    Procedure: RECESSION GASTROCNEMIUS;  Surgeon: Rachelle Manriquez DPM, Pod;  Location: [facility]       Prior to Admission Medications   Prior to Admission Medications   Prescriptions Last Dose Informant Patient Reported? Taking?   Cholecalciferol (VITAMIN D3) 25 MCG (1000 UT) CAPS 7/27/2024 at am  Yes Yes   Sig: Take 1,000 Units by mouth daily    Co-Enzyme Q10 100 MG CAPS 7/27/2024 at am  Yes Yes   Sig: Take 100 mg by mouth daily    ELIQUIS ANTICOAGULANT 5 MG tablet 7/27/2024 at pm  No Yes   Sig: Take 1 tablet (5 mg) by mouth 2 times daily   Multiple Vitamins-Minerals (MULTIVITAMIN PO) 7/27/2024 at AM  Yes Yes   Sig: Take 1 tablet by mouth daily    Semaglutide, 2 MG/DOSE, (OZEMPIC, 2 MG/DOSE,) 8 MG/3ML pen 7/24/2024  Yes Yes   Sig: Inject 2 mg subcutaneously every 7 days On Wednesday   amLODIPine (NORVASC) 5 MG tablet 7/27/2024 at am  No Yes   Sig: Take 2 tablets (10 mg) by mouth daily   atorvastatin (LIPITOR) 40 MG tablet 7/27/2024 at pm  Yes  Yes   Sig: Take 40 mg by mouth at bedtime   calcium carbonate (OS-NARA) 500 MG tablet 7/27/2024 at pm  Yes Yes   Sig: Take 1 tablet by mouth 2 times daily   ferrous sulfate (FEROSUL) 325 (65 Fe) MG tablet 7/27/2024 at am  Yes Yes   Sig: Take 325 mg by mouth daily (with breakfast)   gabapentin (NEURONTIN) 100 MG capsule 7/27/2024 at pm  Yes Yes   Sig: Take 400 mg by mouth 3 times daily   gentamicin (GARAMYCIN) 0.1 % external ointment Unknown at PRN  Yes Yes   Sig: Apply topically as needed   hydrochlorothiazide (MICROZIDE) 12.5 MG capsule 7/27/2024 at AM  No Yes   Sig: Take 1 capsule (12.5 mg) by mouth every morning   insulin aspart (NOVOLOG PEN) 100 UNIT/ML pen 7/27/2024 at PM  Yes Yes   Sig: Inject subcutaneously 3 times daily (with meals) Inject 1 unit of insulin for every 3 grams of carbs   insulin degludec (TRESIBA) 100 UNIT/ML pen 7/27/2024 at AM  Yes Yes   Sig: Inject 35-55 Units Subcutaneous every morning   ketoconazole (NIZORAL) 2 % external shampoo 7/26/2024  Yes Yes   Sig: Apply topically twice a week On Tuesday and Friday   lisinopril (ZESTRIL) 40 MG tablet 7/27/2024 at AM  No Yes   Sig: Take 1 tablet (40 mg) by mouth daily   magnesium gluconate (MAGONATE) 500 MG tablet 7/27/2024 at PM  Yes Yes   Sig: Take 500 mg by mouth 3 times daily   metFORMIN (GLUCOPHAGE XR) 500 MG 24 hr tablet 7/27/2024 at PM  No Yes   Sig: TAKE 2 TABLETS(1000 MG) BY MOUTH TWICE DAILY   omeprazole (PRILOSEC) 40 MG DR capsule 7/27/2024 at AM  Yes Yes   Sig: Take 40 mg by mouth daily   propranolol (INDERAL) 20 MG tablet 7/27/2024 at PM  Yes Yes   Sig: Take 20 mg by mouth 2 times daily   tadalafil (CIALIS) 5 MG tablet Unknown at PRN  No Yes   Sig: TAKE 1 TABLET BY MOUTH EVERY DAY AS NEEDED one hour before intercourse      Facility-Administered Medications: None     Allergies   Allergies   Allergen Reactions    Bactrim [Sulfamethoxazole-Trimethoprim] Nausea and Vomiting    Statins Swelling     Other reaction(s): Other (see  comments)  SIMCOR caused edema in extremities  Only Simvastatin    Sulfatolamide Nausea and Vomiting    Niacin Swelling     SIMCOR caused edema in extremities    Simvastatin Swelling     SIMCOR caused edema in extremities    Sulfa Antibiotics Nausea       Immunization History   Immunization History   Administered Date(s) Administered    COVID-19 12+ (2023-24) (Pfizer) 01/31/2024    COVID-19 Bivalent 18+ (Moderna) 01/09/2023    COVID-19 Monovalent 18+ (Moderna) 02/25/2021, 04/03/2021    COVID-19 Monovalent Booster 18+ (Moderna) 01/03/2022, 08/10/2022    Flu, Unspecified 11/04/2015    Hepatitis A (ADULT 19+) 01/31/2024    Influenza (IIV3) PF 12/02/2011, 10/29/2012, 11/11/2013    Influenza Vaccine 18-64 (Flublok) 10/11/2018, 10/21/2019, 01/08/2021, 01/03/2022, 01/09/2023, 01/31/2024    Influenza Vaccine >6 months,quad, PF 11/04/2015    Pneumococcal 20 valent Conjugate (Prevnar 20) 07/06/2022    Pneumococcal 23 valent 11/04/2015    RSV Vaccine (Abrysvo) 01/31/2024    TD,PF 7+ (Tenivac) 01/03/2006    TDAP (Adacel,Boostrix) 01/05/2022    TDAP Vaccine (Adacel) 11/04/2015    Twinrix A/B 11/30/2022    Zoster recombinant adjuvanted (SHINGRIX) 10/21/2019, 12/23/2019       Social History   I have reviewed this patient's social history and updated it with pertinent information if needed. Crispin AKASH Rojas  reports that he has never smoked. He has never used smokeless tobacco. He reports current alcohol use. He reports that he does not use drugs.    Family History   I have reviewed this patient's family history and updated it with pertinent information if needed.   Family History   Problem Relation Age of Onset    Arthritis Mother         SLE    Connective Tissue Disorder Mother         lupus    Diabetes Mother     Cerebrovascular Disease Mother     Cancer Mother     Diabetes Father     Coronary Artery Disease Father     Chronic Obstructive Pulmonary Disease Father     Diabetes Sister     Diabetes Maternal Grandfather   "      Review of Systems   The 10 point Review of Systems is negative    Physical Exam   Temp: 97.5  F (36.4  C) Temp src: Oral BP: 134/74 Pulse: 54   Resp: 18 SpO2: 98 % O2 Device: None (Room air)    Vital Signs with Ranges  Temp:  [97.5  F (36.4  C)-98.1  F (36.7  C)] 97.5  F (36.4  C)  Pulse:  [54-66] 54  Resp:  [16-18] 18  BP: (111-134)/(53-74) 134/74  SpO2:  [94 %-98 %] 98 %  244 lbs 0 oz  Body mass index is 34.03 kg/m .    GENERAL APPEARANCE:  awake  EYES: Eyes grossly normal to inspection  NECK: no adenopathy  RESP: lungs clear   CV: regular rates and rhythm  LYMPHATICS: normal ant/post cervical and supraclavicular nodes  ABDOMEN: soft, nontender  MS: extremities hand is covered, proximal cellulitis is resolved, both feet transmetatarsal amputations look okay  SKIN: no suspicious lesions or rashes        Data   All laboratory and imaging data in the past 24 hours reviewed  No results for input(s): \"CULT\" in the last 168 hours.  Recent Labs   Lab Test 04/20/21  0925 04/20/21  0825 11/07/19  1829 11/07/19  1816 10/31/19  0229 10/31/19  0213 08/02/19  1714 06/24/19 2002 06/24/19  1956   CULT No growth No growth No growth No growth No growth No growth No anaerobes isolated  Light growth  Streptococcus dysgalactiae serogroup C/G  *  Light growth  Staphylococcus aureus  * No growth No growth          All cultures:  Recent Labs   Lab 07/30/24  1832 07/28/24  0501   CULTURE No anaerobic organisms isolated after 1 day  No growth, less than 1 day  See corresponding culture for results No growth after 4 days      Blood culture:  Results for orders placed or performed during the hospital encounter of 07/28/24   Blood Culture Peripheral Blood    Specimen: Peripheral Blood   Result Value Ref Range    Culture No growth after 4 days    Results for orders placed or performed during the hospital encounter of 06/17/24   Blood Culture Peripheral Blood    Specimen: Peripheral Blood   Result Value Ref Range    Culture No " "Growth    Blood Culture Peripheral Blood    Specimen: Peripheral Blood   Result Value Ref Range    Culture No Growth    Results for orders placed or performed during the hospital encounter of 07/22/22   Blood Culture Peripheral Blood    Specimen: Peripheral Blood   Result Value Ref Range    Culture No Growth    Blood Culture Hand, Left    Specimen: Hand, Left; Blood   Result Value Ref Range    Culture No Growth    Blood Culture Arm, Left    Specimen: Arm, Left; Blood   Result Value Ref Range    Culture No Growth    Blood Culture Arm, Right    Specimen: Arm, Right; Blood   Result Value Ref Range    Culture No Growth    Blood Culture Hand, Right    Specimen: Hand, Right; Blood   Result Value Ref Range    Culture No Growth    Blood Culture Hand, Right    Specimen: Hand, Right; Blood   Result Value Ref Range    Culture No Growth    Blood Culture Peripheral Blood    Specimen: Peripheral Blood   Result Value Ref Range    Culture Positive on the 1st day of incubation (A)     Culture Staphylococcus hominis (AA)        Susceptibility    Staphylococcus hominis - JEFFREY*     Oxacillin* <=0.25 Susceptible ug/mL      * Oxacillin susceptible isolates are susceptible to cephalosporins (example: cefazolin and cephalexin) and beta lactam combination agents. Oxacillin resistant isolates are resistant to these agents.     Gentamicin <=0.5 Susceptible ug/mL     Ciprofloxacin <=0.5 Susceptible ug/mL     Levofloxacin <=0.12 Susceptible ug/mL     Erythromycin <=0.25 Susceptible ug/mL     Clindamycin <=0.25 Susceptible ug/mL     Vancomycin 1 Susceptible ug/mL     Tetracycline <=1 Susceptible ug/mL     * Antibiotics listed as \"No Interpretation\" have no regulatory guidelines for susceptibility/resistance available.   Blood Culture Peripheral Blood    Specimen: Peripheral Blood   Result Value Ref Range    Culture Positive on the 1st day of incubation (A)     Culture Staphylococcus aureus (AA)        Susceptibility    Staphylococcus aureus - JEFFREY "     Oxacillin* 0.5 Susceptible ug/mL      * Oxacillin susceptible isolates are susceptible to cephalosporins (example: cefazolin and cephalexin) and beta lactam combination agents. Oxacillin resistant isolates are resistant to these agents.     Gentamicin <=0.5 Susceptible ug/mL     Erythromycin >=8 Resistant ug/mL     Clindamycin* <=0.25 Susceptible ug/mL      * This isolate DOES NOT demonstrate inducible clindamycin resistance in vitro. Clindamycin is susceptible and could be used when indicated, however, erythromycin is resistant and should not be used.     Vancomycin <=0.5 Susceptible ug/mL     Tetracycline <=1 Susceptible ug/mL     Trimethoprim/Sulfamethoxazole <=0.5/9.5 Susceptible ug/mL   Results for orders placed or performed during the hospital encounter of 06/03/22   Blood Culture Hand, Right    Specimen: Hand, Right; Blood   Result Value Ref Range    Culture No Growth    Blood Culture Arm, Right    Specimen: Arm, Right; Blood   Result Value Ref Range    Culture No Growth    Results for orders placed or performed during the hospital encounter of 04/20/21   Blood culture    Specimen: Blood    Left Arm   Result Value Ref Range    Specimen Description Blood Left Arm     Culture Micro No growth    Blood culture    Specimen: Blood    Right Arm   Result Value Ref Range    Specimen Description Blood Right Arm     Culture Micro No growth    Results for orders placed or performed during the hospital encounter of 11/07/19   Blood culture    Specimen: Blood    Right Arm   Result Value Ref Range    Specimen Description Blood Right Arm     Special Requests Aerobic and anaerobic bottles received     Culture Micro No growth    Blood culture    Specimen: Blood    Right Hand   Result Value Ref Range    Specimen Description Blood Right Hand     Special Requests Aerobic and anaerobic bottles received     Culture Micro No growth    Results for orders placed or performed during the hospital encounter of 10/31/19   Blood  culture    Specimen: Blood    Right Hand   Result Value Ref Range    Specimen Description Blood Right Hand     Special Requests Aerobic and anaerobic bottles received     Culture Micro No growth    Blood culture    Specimen: Blood    Left Arm   Result Value Ref Range    Specimen Description Blood Left Arm     Special Requests Aerobic and anaerobic bottles received     Culture Micro No growth    Results for orders placed or performed during the hospital encounter of 06/24/19   Blood culture    Specimen: Blood    Right Hand   Result Value Ref Range    Specimen Description Blood Right Hand     Special Requests Aerobic and anaerobic bottles received     Culture Micro No growth    Blood culture    Specimen: Blood    Right Arm   Result Value Ref Range    Specimen Description Blood Right Arm     Special Requests Aerobic and anaerobic bottles received     Culture Micro No growth      *Note: Due to a large number of results and/or encounters for the requested time period, some results have not been displayed. A complete set of results can be found in Results Review.      Urine culture:  No results found. However, due to the size of the patient record, not all encounters were searched. Please check Results Review for a complete set of results.

## 2024-08-01 NOTE — PLAN OF CARE
"Goal Outcome Evaluation:      Plan of Care Reviewed With: patient    Overall Patient Progress: improving          VS: Afebrile. VSS.   Respiratory: WDL; lung sounds clear.   GI: Denies nausea. Tolerating regular diet. Pt reports not passing gas; declined PRN Senna. Bedtime ; pt refused insulin.   : Voiding.   Peripheral Neurovascular: Denies numbness and tingling in RUE; Able to wiggle fingers. Brachial pulses 2+. Cap refill less than 3 seconds. Mild edema above RUE dressing. Baseline numbness/tingling in both lower extremities.   Skin: RUE dressing clean, dry, intact.   Activity: Independent.   Pain: Rating pain 0-6. PRN Tylenol x1 given.   Lines: L PIV SL.   Plan: Pain management. IV antibiotics. Monitor peripheral neurovascular status.           Problem: Adult Inpatient Plan of Care  Goal: Plan of Care Review  Description: The Plan of Care Review/Shift note should be completed every shift.  The Outcome Evaluation is a brief statement about your assessment that the patient is improving, declining, or no change.  This information will be displayed automatically on your shift  note.  Outcome: Progressing  Flowsheets (Taken 8/1/2024 0428)  Plan of Care Reviewed With: patient  Overall Patient Progress: improving  Goal: Patient-Specific Goal (Individualized)  Description: You can add care plan individualizations to a care plan. Examples of Individualization might be:  \"Parent requests to be called daily at 9am for status\", \"I have a hard time hearing out of my right ear\", or \"Do not touch me to wake me up as it startles  me\".  Outcome: Progressing  Goal: Absence of Hospital-Acquired Illness or Injury  Outcome: Progressing  Intervention: Identify and Manage Fall Risk  Recent Flowsheet Documentation  Taken 7/31/2024 2009 by Rabia Barrera RN  Safety Promotion/Fall Prevention:   assistive device/personal items within reach   clutter free environment maintained   lighting adjusted   nonskid shoes/slippers when out " of bed   patient and family education   room near nurse's station   room organization consistent   safety round/check completed  Intervention: Prevent Skin Injury  Recent Flowsheet Documentation  Taken 8/1/2024 0420 by Rabia Barrera RN  Body Position: position changed independently  Taken 7/31/2024 2245 by Rabia Barrera RN  Body Position: position changed independently  Taken 7/31/2024 2009 by Rabia Barrera RN  Body Position: supine, head elevated  Skin Protection: adhesive use limited  Device Skin Pressure Protection: adhesive use limited  Intervention: Prevent and Manage VTE (Venous Thromboembolism) Risk  Recent Flowsheet Documentation  Taken 7/31/2024 2009 by Rabia Barrera RN  VTE Prevention/Management: (Ambulatory) SCDs off (sequential compression devices)  Intervention: Prevent Infection  Recent Flowsheet Documentation  Taken 7/31/2024 2009 by Rabia Barrera RN  Infection Prevention:   environmental surveillance performed   equipment surfaces disinfected   hand hygiene promoted   personal protective equipment utilized   rest/sleep promoted   single patient room provided  Goal: Optimal Comfort and Wellbeing  Outcome: Progressing  Intervention: Monitor Pain and Promote Comfort  Recent Flowsheet Documentation  Taken 8/1/2024 0420 by Rabia Barrera RN  Pain Management Interventions:   medication (see MAR)   care clustered   emotional support   rest   repositioned  Goal: Readiness for Transition of Care  Outcome: Progressing     Problem: Comorbidity Management  Goal: Blood Glucose Levels Within Targeted Range  Outcome: Progressing  Intervention: Monitor and Manage Glycemia  Recent Flowsheet Documentation  Taken 7/31/2024 2009 by Rabia Barrera RN  Glycemic Management: blood glucose monitored  Medication Review/Management:   medications reviewed   high-risk medications identified  Goal: Blood Pressure in Desired Range  Outcome: Progressing  Intervention: Maintain Blood Pressure Management  Recent Flowsheet  Documentation  Taken 7/31/2024 2009 by Rabia Barrera, RN  Medication Review/Management:   medications reviewed   high-risk medications identified     Problem: Skin or Soft Tissue Infection  Goal: Absence of Infection Signs and Symptoms  Outcome: Progressing  Intervention: Minimize and Manage Infection Progression  Recent Flowsheet Documentation  Taken 7/31/2024 2009 by Rabia Barrera, RN  Infection Prevention:   environmental surveillance performed   equipment surfaces disinfected   hand hygiene promoted   personal protective equipment utilized   rest/sleep promoted   single patient room provided  Intervention: Provide Meticulous Infection Site Care  Recent Flowsheet Documentation  Taken 7/31/2024 2009 by Rabia Barrera, RN  Topical Inflammation Care: border recession noted

## 2024-08-01 NOTE — PROGRESS NOTES
Patient was fit with a cockup wrist splint.  Wear, care provided as well as contact information.  Patient has no questions at this time.  Celestino Kong.

## 2024-08-01 NOTE — PROGRESS NOTES
[facility]    Medicine Progress Note - Hospitalist Service    Date of Admission:  7/28/2024    Assessment & Plan   64 year old gentleman with DVT on apixaban, CVA, essential hypertension, hypercholesteremia, T2DM, peripheral vascular disease, obstructive sleep apnea came to [facility] 7/28/2024 after a dog bite 5 days prior to admission while petting an unknown dog that belonged to his neighbor and having swelling and pain of the right hand unable to make a fist due to pain, fevers WBC of 9.0 was started on Unasyn 7/28/2024 was evaluated by orthopedic surgery who did not think that the patient has tenosynovitis and to continue the antibiotic management with elevation of the right hand.  Splint was not thought to be needed he had MRI scan of the right hand which showed fluid collection versus abscess for which patient was taken to the operating room 7/30/2024 with irrigation and debridement of the right ulcer hand and wrist with drainage of hayde pus the culture of which was done and at this time is growing gram-positive cocci.  Patient is on treatment with Unasyn.  Plan is to discharge home when cultures are back, likely 8/2.    Right hand dog bite  Right hand cellulitis s/p Irrigation and Debridement of Right Dorsal Hand and wrist:  Patient came in with right hand pain, swelling, fevers after dog bite 5-day prior to admission from an unknown dog in the neighborhood.  Ultrasound right hand was ordered by orthopedic surgery 7/29/2024 and patient had drainage of abscess from the right hand and 1/30/2024 the cultures are sent and is growing gram-positive cocci.    -  Appreciate orthopedics team assistance, plan for discharge soon with 1 week follow-up noted.  Orthotics preparing cock-up wrist brace for comfort.  -  Pain and swelling improving.  Patient wants to try to avoid opioids at this point.  Plan tylenol for pain.  Continue Unasyn IV.  Appreciate ID consult.  Plan for one more  "day of IV abx, likely to oral tomorrow pending culture results.       DM2:  -Continue degludec 35 units daily  -Medium intensity sliding scale insulin -she does not want to take this but rather want to take what he takes at home 1 unit of short acting insulin for 3 g of carbs.  Continue current carb count insulin.  -Continue metformin     Recent Labs   Lab 08/01/24  1129 08/01/24  0811 08/01/24  0648 07/31/24  2232 07/31/24  1756   * 103* 126*  126* 200* 181*       Recurrent DVT:  -Stop lovenox, resume home PTA apixaban this evening.  -Last took 0100 on 07/28     History of CVA  -Continue statin  -Apixaban as above     HTN  -Continue amlodipine, propranolol  -Resume 20 mg lisinopril today.  Likely will move back to full dose tomorrow.     HLD  -Continue atorvastatin 40 mg daily     Pain/neuropathy   -continue gabapentin     Obesity  -Will require extra nursing resources routine cares          PPE Used:  Mask        Diet: Advance Diet as Tolerated: Regular Diet Adult    DVT Prophylaxis: DOAC  Mccarthy Catheter: Not present  Lines: None     Cardiac Monitoring: None  Code Status: Full Code      Clinically Significant Risk Factors                  # Hypertension: Noted on problem list           # DMII: A1C = 6.8 % (Ref range: 0.0 - 5.6 %) within past 6 months, PRESENT ON ADMISSION  # Obesity: Estimated body mass index is 34.03 kg/m  as calculated from the following:    Height as of this encounter: 1.803 m (5' 11\").    Weight as of this encounter: 110.7 kg (244 lb)., PRESENT ON ADMISSION            Disposition Plan     Medically Ready for Discharge: Anticipated Tomorrow             Jeremy Valdes DO  Hospitalist Service  [facility]  Securely message with [employer] (more info)  Text page via [employer] Paging/Directory   ______________________________________________________________________    Interval History   Assumed hospitalist team care today.  History reviewed.  Mr. Rojas is feeling much " better.  Pain, swelling, erythema all are much better.  He denies any major pain, sob, nausea, other new complaints.    Physical Exam   Vital Signs: Temp: 97.9  F (36.6  C) Temp src: Oral BP: 134/71 Pulse: 60   Resp: 18 SpO2: 96 % O2 Device: None (Room air)    Weight: 244 lbs 0 oz    GEN:  Alert, oriented x 3, appears comfortable, no overt distress.  HEENT:  Normocephalic/atraumatic, no scleral icterus, no nasal discharge, mouth moist.  CV:  Regular rate and rhythm, no murmur or JVD.  S1 + S2 noted, no S3 or S4.  LUNGS:  Clear to auscultation bilaterally without rales/rhonchi/wheezing/retractions.  Symmetric chest rise on inhalation noted.  ABD:  Active bowel sounds, soft, non-tender/non-distended.  No guarding/rigidity.  EXT:  No edema outside right arm.  Right hand/wrist area dressed with Mild swelling/edema noted in area.  Detailed surgical site exam deferred to orthopedics.  Prior lines for erythema demonstrate substantial improvement.   SKIN:  Dry to touch, no new exanthems noted in the visualized areas.    Medical Decision Making       45 MINUTES SPENT BY ME on the date of service doing chart review, history, exam, documentation & further activities per the note.      Data   Medications   Current Facility-Administered Medications   Medication Dose Route Frequency Provider Last Rate Last Admin    Patient is already receiving anticoagulation with heparin, enoxaparin (LOVENOX), warfarin (COUMADIN)  or other anticoagulant medication   Does not apply Continuous PRN Wili Hunter MD         Current Facility-Administered Medications   Medication Dose Route Frequency Provider Last Rate Last Admin    amLODIPine (NORVASC) tablet 10 mg  10 mg Oral Daily Wili Hunter MD   10 mg at 08/01/24 0853    ampicillin-sulbactam (UNASYN) 3 g vial to attach to  mL bag  3 g Intravenous Q6H Wili Hunter MD   3 g at 08/01/24 1201    apixaban ANTICOAGULANT (ELIQUIS) tablet 5 mg  5 mg Oral BID Jeremy Valdes  P, DO        atorvastatin (LIPITOR) tablet 40 mg  40 mg Oral At Bedtime Wili Hunter MD   40 mg at 07/31/24 2242    chlorhexidine (HIBICLENS) 4 % solution   Topical Daily Shira Dixon PA-C   Given at 07/30/24 0947    gabapentin (NEURONTIN) capsule 400 mg  400 mg Oral TID Wili Hunter MD   400 mg at 08/01/24 1324    insulin aspart (NovoLOG) injection (RAPID ACTING)   Subcutaneous Daily with breakfast Josie Roa, DO   11 Units at 08/01/24 0855    insulin aspart (NovoLOG) injection (RAPID ACTING)   Subcutaneous Daily with lunch Josie Roa, DO   15 Units at 08/01/24 1406    insulin aspart (NovoLOG) injection (RAPID ACTING)   Subcutaneous Daily with supper Josie Roa DO   17 Units at 07/31/24 1905    insulin aspart (NovoLOG) injection (RAPID ACTING)  1-7 Units Subcutaneous TID AC Wili Hunter MD   1 Units at 07/31/24 1905    insulin aspart (NovoLOG) injection (RAPID ACTING)  1-5 Units Subcutaneous At Bedtime Wili Hunter MD        insulin degludec (TRESIBA) 100 UNIT/ML injection 35 Units  35 Units Subcutaneous Paty Lombardo MD   35 Units at 08/01/24 0854    metFORMIN (GLUCOPHAGE XR) 24 hr tablet 1,000 mg  1,000 mg Oral BID Uil Drummond MD   1,000 mg at 08/01/24 0853    pantoprazole (PROTONIX) EC tablet 40 mg  40 mg Oral BID Wili Hunter MD   40 mg at 08/01/24 0853    propranolol (INDERAL) tablet 20 mg  20 mg Oral BID Wili Hunter MD   20 mg at 08/01/24 0853     Labs and Imaging results below reviewed today.  Recent Labs   Lab 07/29/24  0652 07/28/24  0501   WBC 7.6 9.0   HGB 10.5* 11.4*   HCT 33.2* 35.3*   MCV 88 89   * 154     Recent Labs   Lab 08/01/24  1129 08/01/24  0811 08/01/24  0648 07/29/24  0937 07/29/24  0652 07/28/24  0911 07/28/24  0501   NA  --   --  141  --  136  --  137   POTASSIUM  --   --  4.0  --  4.0  --  4.3   CHLORIDE  --   --  106  --  103  --  102   CO2  --   --  21*  --  23  --  20*   ANIONGAP  --   --   14  --  10  --  15   * 103* 126*  126*   < > 186*   < > 98   BUN  --   --  21.6  --  17.1  --  25.2*   CR  --   --  1.00  --  1.06  --  1.17   GFRESTIMATED  --   --  85  --  79  --  70   NARA  --   --  8.5*  --  8.5*  --  9.4    < > = values in this interval not displayed.     No results found for this or any previous visit (from the past 24 hour(s)).

## 2024-08-02 VITALS
SYSTOLIC BLOOD PRESSURE: 134 MMHG | OXYGEN SATURATION: 98 % | RESPIRATION RATE: 20 BRPM | TEMPERATURE: 97.8 F | HEIGHT: 71 IN | DIASTOLIC BLOOD PRESSURE: 73 MMHG | WEIGHT: 244 LBS | HEART RATE: 68 BPM | BODY MASS INDEX: 34.16 KG/M2

## 2024-08-02 LAB
BACTERIA BLD CULT: NO GROWTH
BACTERIA TISS BX CULT: ABNORMAL
CRP SERPL-MCNC: 30.18 MG/L
GLUCOSE BLDC GLUCOMTR-MCNC: 109 MG/DL (ref 70–99)
GLUCOSE BLDC GLUCOMTR-MCNC: 75 MG/DL (ref 70–99)
GLUCOSE BLDC GLUCOMTR-MCNC: 81 MG/DL (ref 70–99)

## 2024-08-02 PROCEDURE — 86140 C-REACTIVE PROTEIN: CPT | Performed by: PHYSICIAN ASSISTANT

## 2024-08-02 PROCEDURE — 36415 COLL VENOUS BLD VENIPUNCTURE: CPT | Performed by: PHYSICIAN ASSISTANT

## 2024-08-02 PROCEDURE — 250N000013 HC RX MED GY IP 250 OP 250 PS 637: Performed by: INTERNAL MEDICINE

## 2024-08-02 PROCEDURE — 99239 HOSP IP/OBS DSCHRG MGMT >30: CPT | Performed by: INTERNAL MEDICINE

## 2024-08-02 PROCEDURE — 99232 SBSQ HOSP IP/OBS MODERATE 35: CPT | Performed by: INTERNAL MEDICINE

## 2024-08-02 PROCEDURE — 250N000011 HC RX IP 250 OP 636: Performed by: INTERNAL MEDICINE

## 2024-08-02 RX ADMIN — ACETAMINOPHEN 975 MG: 325 TABLET, FILM COATED ORAL at 09:22

## 2024-08-02 RX ADMIN — PROPRANOLOL HYDROCHLORIDE 20 MG: 20 TABLET ORAL at 09:23

## 2024-08-02 RX ADMIN — LISINOPRIL 20 MG: 20 TABLET ORAL at 09:25

## 2024-08-02 RX ADMIN — ACETAMINOPHEN 975 MG: 325 TABLET, FILM COATED ORAL at 01:41

## 2024-08-02 RX ADMIN — INSULIN DEGLUDEC 35 UNITS: 100 INJECTION, SOLUTION SUBCUTANEOUS at 09:28

## 2024-08-02 RX ADMIN — AMPICILLIN SODIUM AND SULBACTAM SODIUM 3 G: 2; 1 INJECTION, POWDER, FOR SOLUTION INTRAMUSCULAR; INTRAVENOUS at 05:22

## 2024-08-02 RX ADMIN — PANTOPRAZOLE SODIUM 40 MG: 40 TABLET, DELAYED RELEASE ORAL at 09:23

## 2024-08-02 RX ADMIN — GABAPENTIN 400 MG: 400 CAPSULE ORAL at 09:25

## 2024-08-02 RX ADMIN — APIXABAN 5 MG: 5 TABLET, FILM COATED ORAL at 09:23

## 2024-08-02 RX ADMIN — METFORMIN HYDROCHLORIDE 1000 MG: 500 TABLET, EXTENDED RELEASE ORAL at 09:25

## 2024-08-02 RX ADMIN — AMPICILLIN SODIUM AND SULBACTAM SODIUM 3 G: 2; 1 INJECTION, POWDER, FOR SOLUTION INTRAMUSCULAR; INTRAVENOUS at 11:20

## 2024-08-02 RX ADMIN — AMLODIPINE BESYLATE 10 MG: 10 TABLET ORAL at 09:23

## 2024-08-02 ASSESSMENT — ACTIVITIES OF DAILY LIVING (ADL)
ADLS_ACUITY_SCORE: 20

## 2024-08-02 NOTE — PLAN OF CARE
"Goal Outcome Evaluation:      Plan of Care Reviewed With: patient    Overall Patient Progress: improving      [id number]:     VS: Afebrile. VSS.   Respiratory: WDL; lung sounds clear.   GI: Denies nausea. Tolerating regular diet. Passing gas. Bedtime .   : Voiding.   Peripheral Neurovascular: Denies numbness and tingling in RUE; Able to wiggle fingers. Brachial pulses 2+. Cap refill less than 3 seconds. Mild edema on R fingers. Baseline numbness/tingling in both lower extremities.   Skin: RUE dressing clean, dry, intact; splint on.   Activity: Independent.   Pain: Denies.   Lines: L PIV SL.   Plan: Pain management. IV antibiotics. Monitor peripheral neurovascular status.           Problem: Adult Inpatient Plan of Care  Goal: Plan of Care Review  Description: The Plan of Care Review/Shift note should be completed every shift.  The Outcome Evaluation is a brief statement about your assessment that the patient is improving, declining, or no change.  This information will be displayed automatically on your shift  note.  Outcome: Progressing  Flowsheets (Taken 8/1/2024 2214)  Plan of Care Reviewed With: patient  Overall Patient Progress: improving  Goal: Patient-Specific Goal (Individualized)  Description: You can add care plan individualizations to a care plan. Examples of Individualization might be:  \"Parent requests to be called daily at 9am for status\", \"I have a hard time hearing out of my right ear\", or \"Do not touch me to wake me up as it startles  me\".  Outcome: Progressing  Goal: Absence of Hospital-Acquired Illness or Injury  Outcome: Progressing  Intervention: Identify and Manage Fall Risk  Recent Flowsheet Documentation  Taken 8/1/2024 2021 by Rabia Barrera, RN  Safety Promotion/Fall Prevention:   assistive device/personal items within reach   clutter free environment maintained   lighting adjusted   nonskid shoes/slippers when out of bed   patient and family education   room near nurse's station   " room organization consistent   safety round/check completed  Intervention: Prevent Skin Injury  Recent Flowsheet Documentation  Taken 8/1/2024 2021 by Rabia Barrera RN  Body Position: position changed independently  Skin Protection: adhesive use limited  Device Skin Pressure Protection: adhesive use limited  Intervention: Prevent and Manage VTE (Venous Thromboembolism) Risk  Recent Flowsheet Documentation  Taken 8/1/2024 2021 by Rabia Barrera RN  VTE Prevention/Management: (Independent) SCDs off (sequential compression devices)  Intervention: Prevent Infection  Recent Flowsheet Documentation  Taken 8/1/2024 2021 by Rabia Barrera RN  Infection Prevention:   environmental surveillance performed   equipment surfaces disinfected   hand hygiene promoted   personal protective equipment utilized   rest/sleep promoted   single patient room provided  Goal: Optimal Comfort and Wellbeing  Outcome: Progressing  Goal: Readiness for Transition of Care  Outcome: Progressing     Problem: Comorbidity Management  Goal: Blood Glucose Levels Within Targeted Range  Outcome: Progressing  Intervention: Monitor and Manage Glycemia  Recent Flowsheet Documentation  Taken 8/1/2024 2021 by Rabia Barrera RN  Glycemic Management: blood glucose monitored  Medication Review/Management:   medications reviewed   high-risk medications identified  Goal: Blood Pressure in Desired Range  Outcome: Progressing  Intervention: Maintain Blood Pressure Management  Recent Flowsheet Documentation  Taken 8/1/2024 2021 by Rabia Barrera RN  Medication Review/Management:   medications reviewed   high-risk medications identified     Problem: Skin or Soft Tissue Infection  Goal: Absence of Infection Signs and Symptoms  Outcome: Progressing  Intervention: Minimize and Manage Infection Progression  Recent Flowsheet Documentation  Taken 8/1/2024 2021 by Rabia Barrera RN  Infection Prevention:   environmental surveillance performed   equipment surfaces  disinfected   hand hygiene promoted   personal protective equipment utilized   rest/sleep promoted   single patient room provided  Intervention: Provide Meticulous Infection Site Care  Recent Flowsheet Documentation  Taken 8/1/2024 2021 by Rabia Barrera, RN  Topical Inflammation Care: border recession noted

## 2024-08-02 NOTE — PROGRESS NOTES
[facility]  Infectious Disease Progress Note          Assessment and Plan:   Date of Admission:  7/28/2024  Date of Consult (When I saw the patient): 08/01/24        Assessment & Plan  Crispin Rojas is a 64 year old male who was admitted on 7/28/2024.      Impression: 1 64-year-old male, very well-known to me, now admitted with a new issue with a right dorsal hand dog bite related infection, abscess present but no major tenosynovitis or deeper infection, status post I&D no major clinical sepsis, cultures pending, Gram stain with gram-positive cocci rather than the expected Pasteurella type organism  2 unknown dog bite exposure, domesticated animal being walked by neighbor so very low likelihood of rabies very likely vaccinated but unknown  3 recurrent bilateral diabetic foot infections and osteomyelitis including recent left diabetic foot infection and transmetatarsal amputation, that same foot had been on chronic antibiotics directed by us long-term for suppressing presumed osteo but now fully resected with transmetatarsal amputation  4 prior recurrent infections including right transmetatarsal amputation  5 diabetes mellitus  6 recurrent deep venous thrombosis     REC 1 Unasyn appears to be covering it well as she is much improved, no obvious deep infection, culture with strep, okay disposition on Augmentin 875 twice daily for 10 days.  2 foot looks okay no obvious ongoing infection issue.  Prior long-term suppression antibiotics no longer indicated with transmetatarsal amputation completed  3 very low risk rabies exposure but not 0, ample time with the distal bite to try to find the dog, if able to locate in the dog's either live or known vaccinated no intervention needed              Interval History:     no new complaints and doing well; no cp, sob, n/v/d, or abd pain.  Feels well and hand moving without difficulty, culture with pure culture of strep so far, even if Pasteurella  involved will be well covered by the Augmentin              Medications:     Current Facility-Administered Medications   Medication Dose Route Frequency Provider Last Rate Last Admin    amLODIPine (NORVASC) tablet 10 mg  10 mg Oral Daily Wili Hunter MD   10 mg at 08/02/24 0923    ampicillin-sulbactam (UNASYN) 3 g vial to attach to  mL bag  3 g Intravenous Q6H Wili Hunter MD   3 g at 08/02/24 0522    apixaban ANTICOAGULANT (ELIQUIS) tablet 5 mg  5 mg Oral BID Jeremy Valdes DO   5 mg at 08/02/24 0923    atorvastatin (LIPITOR) tablet 40 mg  40 mg Oral At Bedtime Wili Hunter MD   40 mg at 08/01/24 2242    chlorhexidine (HIBICLENS) 4 % solution   Topical Daily Shira Dixon PA-C   Given at 07/30/24 0947    gabapentin (NEURONTIN) capsule 400 mg  400 mg Oral TID Wili Hunter MD   400 mg at 08/02/24 0925    insulin aspart (NovoLOG) injection (RAPID ACTING)   Subcutaneous Daily with breakfast Josie Roa DO   13 Units at 08/02/24 0926    insulin aspart (NovoLOG) injection (RAPID ACTING)   Subcutaneous Daily with lunch Josie Roa DO   15 Units at 08/01/24 1406    insulin aspart (NovoLOG) injection (RAPID ACTING)   Subcutaneous Daily with supper Josie Roa DO   14 Units at 08/01/24 1910    insulin aspart (NovoLOG) injection (RAPID ACTING)  1-7 Units Subcutaneous TID AC Wili Hunter MD   1 Units at 07/31/24 1905    insulin aspart (NovoLOG) injection (RAPID ACTING)  1-5 Units Subcutaneous At Bedtime Wili Hunter MD        insulin degludec (TRESIBA) 100 UNIT/ML injection 35 Units  35 Units Subcutaneous Paty Lombardo MD   35 Units at 08/02/24 0928    lisinopril (ZESTRIL) tablet 20 mg  20 mg Oral Daily Jeremy Valdes DO   20 mg at 08/02/24 0925    metFORMIN (GLUCOPHAGE XR) 24 hr tablet 1,000 mg  1,000 mg Oral BID Uli Drummond MD   1,000 mg at 08/02/24 0925    pantoprazole (PROTONIX) EC tablet 40 mg  40 mg Oral BID Wili Hunter  "MD Joseph   40 mg at 08/02/24 0923    propranolol (INDERAL) tablet 20 mg  20 mg Oral BID Wili Hunter MD   20 mg at 08/02/24 0923                  Physical Exam:   Blood pressure 134/73, pulse 68, temperature 97.8  F (36.6  C), temperature source Oral, resp. rate 20, height 1.803 m (5' 11\"), weight 110.7 kg (244 lb), SpO2 98%.  Wt Readings from Last 2 Encounters:   07/28/24 110.7 kg (244 lb)   07/10/24 106.6 kg (235 lb)     Vital Signs with Ranges  Temp:  [97.5  F (36.4  C)-98  F (36.7  C)] 97.8  F (36.6  C)  Pulse:  [59-68] 68  Resp:  [18-20] 20  BP: (116-147)/(55-77) 134/73  SpO2:  [94 %-100 %] 98 %    Constitutional: Awake, alert, cooperative, no apparent distress     Lungs: Clear to auscultation bilaterally, no crackles or wheezing   Cardiovascular: Regular rate and rhythm, normal S1 and S2, and no murmur noted   Abdomen: Normal bowel sounds, soft, non-distended, non-tender   Skin: No rashes, no cyanosis, no edema   Other:           Data:   All microbiology laboratory data reviewed.  Recent Labs   Lab Test 07/29/24  0652 07/28/24  0501 07/05/24  1157   WBC 7.6 9.0 6.3   HGB 10.5* 11.4* 11.5*   HCT 33.2* 35.3* 37.3*   MCV 88 89 91   * 154 194     Recent Labs   Lab Test 08/01/24  0648 07/29/24  0652 07/28/24  0501   CR 1.00 1.06 1.17     Recent Labs   Lab Test 07/28/24  1400   SED 24*     Recent Labs   Lab Test 04/20/21  0925 04/20/21  0825 11/07/19  1829 11/07/19  1816 10/31/19  0229 10/31/19  0213 08/02/19  1714 06/24/19  2002 06/24/19  1956   CULT No growth No growth No growth No growth No growth No growth No anaerobes isolated  Light growth  Streptococcus dysgalactiae serogroup C/G  *  Light growth  Staphylococcus aureus  * No growth No growth        "

## 2024-08-02 NOTE — DISCHARGE SUMMARY
[facility]  Discharge Summary  Hospitalist    Date of Admission:  7/28/2024  Date of Discharge:  8/2/2024  2:57 PM  Provider:  Jeremy Valdes DO, FHM    Discharge Diagnoses   Right hand dog bite with associated cellulitis due to Strep Pyogenes s/p I&D     Other medical issues:  Past Medical History:   Diagnosis Date    Antiplatelet or antithrombotic long-term use     Ascending aorta dilatation (H24)     Cerebral artery occlusion with cerebral infarction (H)     Cerebral infarction (H)     2010    Gastroesophageal reflux disease with esophagitis     H/O blood clots 2022    Hyperlipidemia LDL goal <70     Hypertension     Nonalcoholic steatohepatitis 11/30/2022    Numbness and tingling     Obesity     GILA (obstructive sleep apnea) 10/22/2018    CPAP intolerant    PVD (peripheral vascular disease) (H24)     s/p left partial toe amputation    Renal stone     Tremor     Type 2 diabetes mellitus with diabetic polyneuropathy, with long-term current use of insulin (H)        History of Present Illness   Crispin Rojas is an 64 year old male who presented with worsening right hand discomfort/swelling following a recent dog bite.  Please see the admission history and physical for full details.    Hospital Course   Crispin Rojas presenting on 7/28/2024.  The following problems were addressed during his hospitalization:    64 year old gentleman with DVT on apixaban, CVA, essential hypertension, hypercholesteremia, T2DM, peripheral vascular disease, obstructive sleep apnea came to [facility] 7/28/2024 after a dog bite 5 days prior to admission while petting an unknown dog that belonged to his neighbor and having swelling and pain of the right hand unable to make a fist due to pain, fevers WBC of 9.0 was started on Unasyn 7/28/2024 was evaluated by orthopedic surgery who did not think that the patient has tenosynovitis and to continue the antibiotic management with elevation of the right  hand.  Splint was not thought to be needed he had MRI scan of the right hand which showed fluid collection versus abscess for which patient was taken to the operating room 7/30/2024 with irrigation and debridement of the right ulcer hand and wrist with drainage of hayde pus the culture of which was done and at this time is growing gram-positive cocci.      Right hand dog bite  Right hand cellulitis s/p Irrigation and Debridement of Right Dorsal Hand and wrist:  Patient came in with right hand pain, swelling, fevers after dog bite 5-day prior to admission from an unknown dog in the neighborhood.  Ultrasound right hand was ordered by orthopedic surgery 7/29/2024 and patient had drainage of abscess from the right hand and 1/30/2024 the cultures are sent and is growing gram-positive cocci.     -  Appreciate orthopedics team assistance, plan for discharge with 1 week follow-up.  Orthotics assisted with cock-up wrist brace for comfort.  -  Pain and swelling improving.  Patient wants to try to avoid opioids at this point.  Plan tylenol for pain.  As recommended by ID, transitioning now to Augmentin 875 BID.  -  Patient is working to ID neighbor dog and discuss rabies vaccination hx with them (see Dr. Sharma's note)     DM2:  -  Continue degludec and meal insulin at discharge.  -  Continue metformin  -  Encouraged long term work on good DM control     Recurrent DVT:  -Home PTA apixaban resumed     History of CVA  -Continue statin  -Apixaban as above     HTN  -Continue amlodipine, propranolol  -Resumed PTA meds by discharge.  BP controlled.     HLD  -Continue atorvastatin 40 mg daily     Pain/neuropathy   -continue gabapentin    Significant Results and Procedures   See op note concerning I&D    Pending Results   Unresulted Labs Ordered in the Past 30 Days of this Admission       Date and Time Order Name Status Description    7/30/2024  6:40 PM Anaerobic Bacterial Culture Routine Preliminary             Code Status   Full  "Code       Primary Care Physician   Johann Serna    Blood pressure 134/73, pulse 68, temperature 97.8  F (36.6  C), temperature source Oral, resp. rate 20, height 1.803 m (5' 11\"), weight 110.7 kg (244 lb), SpO2 98%.  Right hand swelling improving.  Surgical site dressed.  Prior erythema spreading up arm now almost entirely resolved.    Discharge Disposition   Discharged to home    Consultations This Hospital Stay   ORTHOPEDIC SURGERY IP CONSULT  CARE MANAGEMENT / SOCIAL WORK IP CONSULT  SPIRITUAL HEALTH SERVICES IP CONSULT  INFECTIOUS DISEASES IP CONSULT  INFECTIOUS DISEASES IP CONSULT  PHYSICAL THERAPY ADULT IP CONSULT  OCCUPATIONAL THERAPY ADULT IP CONSULT    Time Spent on this Encounter   IJeremy DO, personally saw the patient today and spent greater than 30 minutes discharging this patient.    Discharge Orders      Reason for your hospital stay    Right dorsal hand abscess from dog bite status post irrigation and debridement     Follow-up and recommended labs and tests     Follow-up with Dr. Mcdaniels/Renee Hart PA-C ([facility]) at 5 days postop for reevaluation. No need for follow up labs or testing prior to this appointment.     Please contact Dr. Mcdaniels's care coordinator, Jonas, at [phone] to schedule or for any questions related to your orthopedic injury/surgery.    [facility] [place] After Hours Phone: [phone]     Wound care and dressings    Instructions to care for your wound at home: Do not submerge in water. Cover for showers. Please leave intact until you follow-up with your surgeon.     Activity    Your activity upon discharge: No lifting >1-2 pounds with the right hand to allow for appropriate healing. Okay to wear cock-up wrist brace to wear as needed for comfort over surgical dressings. Patient advised to remove this 3-5 times a day for ROM. Please complete frequent movement of the elbow, wrist, and digits to prevent stiffness.     Discharge " Medications   Current Discharge Medication List        START taking these medications    Details   acetaminophen (TYLENOL) 325 MG tablet Take 3 tablets (975 mg) by mouth every 6 hours as needed for mild pain or other (and adjunct with moderate or severe pain or per patient request)  Qty: 100 tablet, Refills: 0    Associated Diagnoses: Dog bite of hand, right, initial encounter; Cellulitis of right upper extremity      amoxicillin-clavulanate (AUGMENTIN) 875-125 MG tablet Take 1 tablet by mouth 2 times daily for 10 days  Qty: 20 tablet, Refills: 0    Associated Diagnoses: Cellulitis of right upper extremity      senna-docusate (SENOKOT-S/PERICOLACE) 8.6-50 MG tablet Take 1 tablet by mouth 2 times daily as needed for constipation  Qty: 30 tablet, Refills: 0    Associated Diagnoses: Dog bite of hand, right, initial encounter; Cellulitis of right upper extremity           CONTINUE these medications which have NOT CHANGED    Details   amLODIPine (NORVASC) 5 MG tablet Take 2 tablets (10 mg) by mouth daily  Qty: 90 tablet, Refills: 4    Associated Diagnoses: Benign essential hypertension      atorvastatin (LIPITOR) 40 MG tablet Take 40 mg by mouth at bedtime      calcium carbonate (OS-NARA) 500 MG tablet Take 1 tablet by mouth 2 times daily      Cholecalciferol (VITAMIN D3) 25 MCG (1000 UT) CAPS Take 1,000 Units by mouth daily       Co-Enzyme Q10 100 MG CAPS Take 100 mg by mouth daily       ELIQUIS ANTICOAGULANT 5 MG tablet Take 1 tablet (5 mg) by mouth 2 times daily  Qty: 180 tablet, Refills: 4    Associated Diagnoses: Personal history of DVT (deep vein thrombosis)      ferrous sulfate (FEROSUL) 325 (65 Fe) MG tablet Take 325 mg by mouth daily (with breakfast)      gabapentin (NEURONTIN) 100 MG capsule Take 400 mg by mouth 3 times daily      gentamicin (GARAMYCIN) 0.1 % external ointment Apply topically as needed      hydrochlorothiazide (MICROZIDE) 12.5 MG capsule Take 1 capsule (12.5 mg) by mouth every morning  Qty: 90  capsule, Refills: 4    Associated Diagnoses: Benign essential hypertension      insulin aspart (NOVOLOG PEN) 100 UNIT/ML pen Inject subcutaneously 3 times daily (with meals) Inject 1 unit of insulin for every 3 grams of carbs      insulin degludec (TRESIBA) 100 UNIT/ML pen Inject 35-55 Units Subcutaneous every morning      ketoconazole (NIZORAL) 2 % external shampoo Apply topically twice a week On Tuesday and Friday      lisinopril (ZESTRIL) 40 MG tablet Take 1 tablet (40 mg) by mouth daily  Qty: 90 tablet, Refills: 4    Associated Diagnoses: Benign essential hypertension      magnesium gluconate (MAGONATE) 500 MG tablet Take 500 mg by mouth 3 times daily      metFORMIN (GLUCOPHAGE XR) 500 MG 24 hr tablet TAKE 2 TABLETS(1000 MG) BY MOUTH TWICE DAILY  Qty: 360 tablet, Refills: 0    Associated Diagnoses: Type 2 diabetes mellitus with peripheral neuropathy (H)      Multiple Vitamins-Minerals (MULTIVITAMIN PO) Take 1 tablet by mouth daily       omeprazole (PRILOSEC) 40 MG DR capsule Take 40 mg by mouth daily      propranolol (INDERAL) 20 MG tablet Take 20 mg by mouth 2 times daily      Semaglutide, 2 MG/DOSE, (OZEMPIC, 2 MG/DOSE,) 8 MG/3ML pen Inject 2 mg subcutaneously every 7 days On Wednesday      tadalafil (CIALIS) 5 MG tablet TAKE 1 TABLET BY MOUTH EVERY DAY AS NEEDED one hour before intercourse  Qty: 30 tablet, Refills: 1    Associated Diagnoses: Erectile dysfunction due to diseases classified elsewhere             Allergies   Allergies   Allergen Reactions    Bactrim [Sulfamethoxazole-Trimethoprim] Nausea and Vomiting    Statins Swelling     Other reaction(s): Other (see comments)  SIMCOR caused edema in extremities  Only Simvastatin    Sulfatolamide Nausea and Vomiting    Niacin Swelling     SIMCOR caused edema in extremities    Simvastatin Swelling     SIMCOR caused edema in extremities    Sulfa Antibiotics Nausea     Data   Recent Labs   Lab 07/29/24  0652 07/28/24  0501   WBC 7.6 9.0   HGB 10.5* 11.4*   HCT  33.2* 35.3*   MCV 88 89   * 154     7-Day Micro Results       Collected Updated Procedure Result Status      07/30/2024 1832 08/02/2024 2302 Anaerobic Bacterial Culture Routine [[id number]]    Tissue from Hand, Right    Preliminary result Component Value   Culture No anaerobic organisms isolated after 3 days  [P]                07/30/2024 1832 07/30/2024 2313 Gram Stain [[id number]]   (Abnormal)   Tissue from Hand, Right    Final result Component Value   GS Culture See corresponding culture for results   Gram Stain Result 1+ Gram positive cocci   Gram Stain Result 1+ WBC seen            07/30/2024 1832 08/02/2024 0820 Tissue Aerobic Bacterial Culture Routine [[id number]]    (Abnormal)   Tissue from Hand, Right    Final result Component Value   Culture 1+ Streptococcus pyogenes (Group A Streptococcus)    This organism is susceptible to ampicillin, penicillin, vancomycin and the cephalosporins. If treatment is required and your patient is allergic to penicillin, contact the microbiology lab within 5 days to request susceptibility testing.               07/28/2024 0501 08/02/2024 0516 Blood Culture Peripheral Blood [[id number]]   Peripheral Blood    Final result Component Value   Culture No Growth                     Recent Labs   Lab 08/02/24  1207 08/02/24  0659 08/02/24  0146 08/01/24  0811 08/01/24  0648 07/29/24  0937 07/29/24  0652 07/28/24  0911 07/28/24  0501   NA  --   --   --   --  141  --  136  --  137   POTASSIUM  --   --   --   --  4.0  --  4.0  --  4.3   CHLORIDE  --   --   --   --  106  --  103  --  102   CO2  --   --   --   --  21*  --  23  --  20*   ANIONGAP  --   --   --   --  14  --  10  --  15   * 75 81   < > 126*  126*   < > 186*   < > 98   BUN  --   --   --   --  21.6  --  17.1  --  25.2*   CR  --   --   --   --  1.00  --  1.06  --  1.17   GFRESTIMATED  --   --   --   --  85  --  79  --  70   NARA  --   --   --   --  8.5*  --  8.5*  --  9.4    < > = values in this interval  not displayed.     Results for orders placed or performed during the hospital encounter of 07/28/24   XR Hand Right G/E 3 Views    Narrative    EXAM: XR HAND RIGHT G/E 3 VIEWS  LOCATION: [facility]  DATE: 7/28/2024    INDICATION: dog bite to the hand, rule out foreign body and fracture  COMPARISON: None.      Impression    IMPRESSION: Diffuse mild degenerative narrowing of interphalangeal in the third metacarpophalangeal joint spaces. Atherosclerotic calcification seen in the radial artery. No acute fracture or retained radiopaque foreign bodies. Mild soft tissue swelling   is seen in the hand.   US Lower Extremity Venous Duplex Left    Narrative    EXAM: US LOWER EXTREMITY VENOUS DUPLEX LEFT  LOCATION: [facility]  DATE: 7/28/2024    INDICATION: swlling lower ext  COMPARISON: 6/17/2024  TECHNIQUE: Venous Duplex ultrasound of the left lower extremity with and without compression, augmentation and duplex. Color flow and spectral Doppler with waveform analysis performed.    FINDINGS: Exam includes the common femoral, femoral, popliteal, and contralateral common femoral veins as well as segmentally visualized deep calf veins and greater saphenous vein.     LEFT: No deep vein thrombosis. No superficial thrombophlebitis. No popliteal cyst.      Impression    IMPRESSION:  No deep venous thrombosis in the left lower extremity.   US Upper Extremity Non Vascular Right    Narrative    ULTRASOUND RIGHT UPPER EXTREMITY NON VASCULAR July 29, 2024 10:11 AM    INDICATION: Right dorsal hand dog bite, evaluate for fluid collection.    TECHNIQUE: Real-time targeted sonographic images in the area of  concern were obtained using a high frequency linear transducer.  Grayscale and color Doppler imaging utilized.    COMPARISON: None available.       Impression    IMPRESSION: Note, the exam was performed by an ultrasound technologist  only, a remote distance from where the images are  interpreted.    The images submitted demonstrate infiltrative fluid and increased  echogenicity in the intervening soft tissues in the dorsum of the  imaged right hand. The ultrasound technologist reports a complexed  mixed echogenicity area measuring 2.4 x 1.8 x 0.5 cm, uncertain as to  where exactly this is but appears to be superficial to the imaged  tendons presumably extensor tendons.     Right hand MRI without and with intravenous contrast is recommend for  full assessment.    NOTE: ABNORMAL REPORT    THE DICTATION ABOVE DESCRIBES AN ABNORMAL REPORT FOR WHICH FOLLOW-UP  IS NEEDED.    ONESIMO SINGH MD         SYSTEM ID:  [id number]   MR Hand Right w/o & w Contrast    Narrative    EXAM: MR HAND RIGHT W/O and W CONTRAST  LOCATION: [facility]  DATE: 7/30/2024    INDICATION: Right dorsal hand dog bite, eval for fluid collection or signs of deep infection  COMPARISON: Hand x-ray July 28, 2024.  TECHNIQUE: Routine. Additional postgadolinium T1 sequences were obtained.  IV CONTRAST: 11 mL Gadavist    FINDINGS:     TENDONS:   -Extensor tendons: No tendon tear, tendinopathy, or tenosynovitis.  -Flexor tendons: No tear, tendinopathy, or tenosynovitis.    LIGAMENTS:  -Visualized collateral and capsular ligaments: Normal.    JOINTS AND BONES:   -No fracture or bone marrow edema. Normal articular cartilage. No joint effusion or synovitis.    MUSCLES AND SOFT TISSUES:   -Prominent subcutaneous edema and enhancement around the hand, mostly along the dorsum of the hand. The edema and enhancement extends deep to the extensor tendons over the dorsum of the hand from the level of the wrist to the metacarpal heads. Hand   musculature appears normal.    The postcontrast images show a nonenhancing fluid collection which appears to be deep to a puncture wound (series 11 images 23 through 29). The collection measures 22 mm transverse, 7 mm thick, and approximately 3 cm long. This remains superficial to the    extensor tendons. No other focal fluid collection is identified.      Impression    IMPRESSION:  1.  Cellulitis over the dorsum of the right hand with a partially loculated hematoma or abscess collection deep to what appears to be a puncture wound superficial to the level of the distal third metacarpal shaft. No osteomyelitis, septic arthritis or   tendon defect is present.     *Note: Due to a large number of results and/or encounters for the requested time period, some results have not been displayed. A complete set of results can be found in Results Review.

## 2024-08-02 NOTE — PROGRESS NOTES
Orthopedic Surgery  Crispin Rojas  08/02/2024     Admit Date:  7/28/2024    POD: 3 Days Post-Op   Procedure(s):  Irrigation and Debridement of Right Dorsal Hand and wrist    Patient sleeping soundly in chair.  Spoke with RN today regarding patient's status. Awaiting ID's input regarding antibiotics at discharge as intra-op culture now growing GAS.  Afebrile, VSS.  Up ad tasia. Voiding without issue.   No acute events overnight per chart review.     Temp:  [97.5  F (36.4  C)-98  F (36.7  C)] 97.8  F (36.6  C)  Pulse:  [59-68] 68  Resp:  [18-20] 20  BP: (116-147)/(55-77) 134/73  SpO2:  [94 %-100 %] 98 %    Sleeping. NAD. Non-toxic appearing. Thorough exam deferred today as patient is sleeping.  Right hand/wrist dressing is clean, dry, and intact.     Labs:  Recent Labs   Lab Test 07/29/24  0652 07/28/24  0501 07/05/24  1157   WBC 7.6 9.0 6.3   HGB 10.5* 11.4* 11.5*   * 154 194     Recent Labs   Lab Test 11/07/19  1817   INR 1.08     Recent Labs   Lab Test 07/22/22  1705 06/03/22  1203 11/07/19  1817   CRP 70.9* 8.5* <2.9     All cultures:  Recent Labs   Lab 07/30/24  1832 07/28/24  0501   CULTURE 1+ Streptococcus pyogenes (Group A Streptococcus)*  No anaerobic organisms isolated after 2 days  See corresponding culture for results No Growth     A/P    Plan:   Continue with PTA Eliquis. No specific need for DVT prophylaxis from an orthopedic standpoint as this is an upper extremity issue and the patient is mobilizing well.   Continue with antibiotics per medicine team (currently Unasyn). Intra-op aerobic culture with group A Strep. Appreciate ID's assistance.   No lifting >1-2 pounds with the right hand. Encourage frequent right wrist and digital ROM to prevent stiffness.    Orthotics consult placed for a cock-up wrist brace to wear as needed for comfort over his dressings. Patient advised to remove this 3-5 times a day for ROM.    Continue current pain regimen   Dressings: Remain in right hand dressing until  postop follow up. Okay to reinforce or adjust Ace wrap PRN. Cover securely for showers, absolutely no soaking.   Follow-up: 1 week post-op with Dr. Mcdaniels/Renee Hart PA-C      2. Disposition:   Okay to discharge to home today from an orthopedic standpoint as long as antibiotic regimen is determined. Ortho stable.    Maylin Dixon PA-C  [facility]

## 2024-08-02 NOTE — PLAN OF CARE
Afebrile.  CMS intact.  No drainage noted on ace wrap.  Carb counting with each meal and correctly as ordered.  Meeting expected goals for discharge.  Reviewed discharge instructions and medications with patient; verbalizes understanding of care at home.  Pt will plan to  abx at home pharmacy.  Discharged to home.

## 2024-08-02 NOTE — PLAN OF CARE
"Goal Outcome Evaluation:      Plan of Care Reviewed With: patient    Overall Patient Progress: improvingOverall Patient Progress: improving    Outcome Evaluation: VSS. Denied pain.    Note: [id number]    Alert and oriented. RA  Afebrile. VSS. Denies numbness and tingling in RUE. Baseline numbness and tingling in both lower extremities. Tolerating regular diet.  Independent and ambulating to the bathroom. Right upper extremities  ace wrap with splint. Denied pain. Prn tylenol administered per patient request. AC/ HS blood glucose monitoring. BG at 0200 was 81. Continue on IV Unasyn q6hr for skin and tissue infection. PT, ID following.       Problem: Adult Inpatient Plan of Care  Goal: Plan of Care Review  Description: The Plan of Care Review/Shift note should be completed every shift.  The Outcome Evaluation is a brief statement about your assessment that the patient is improving, declining, or no change.  This information will be displayed automatically on your shift  note.  Outcome: Progressing  Flowsheets (Taken 8/2/2024 0428)  Outcome Evaluation: VSS. Denied pain.  Plan of Care Reviewed With: patient  Overall Patient Progress: improving  Goal: Patient-Specific Goal (Individualized)  Description: You can add care plan individualizations to a care plan. Examples of Individualization might be:  \"Parent requests to be called daily at 9am for status\", \"I have a hard time hearing out of my right ear\", or \"Do not touch me to wake me up as it startles  me\".  Outcome: Progressing  Goal: Absence of Hospital-Acquired Illness or Injury  Outcome: Progressing  Intervention: Identify and Manage Fall Risk  Recent Flowsheet Documentation  Taken 8/2/2024 0118 by Danielle Heart RN  Safety Promotion/Fall Prevention:   assistive device/personal items within reach   clutter free environment maintained   lighting adjusted   nonskid shoes/slippers when out of bed   patient and family education   room near nurse's station   room " organization consistent   safety round/check completed  Intervention: Prevent Skin Injury  Recent Flowsheet Documentation  Taken 8/2/2024 0118 by Danielle Heart RN  Body Position: position changed independently  Skin Protection: adhesive use limited  Device Skin Pressure Protection: adhesive use limited  Intervention: Prevent and Manage VTE (Venous Thromboembolism) Risk  Recent Flowsheet Documentation  Taken 8/2/2024 0118 by Danielle Heart RN  VTE Prevention/Management: (Independent) SCDs off (sequential compression devices)  Intervention: Prevent Infection  Recent Flowsheet Documentation  Taken 8/2/2024 0118 by Danielle Heart RN  Infection Prevention:   environmental surveillance performed   equipment surfaces disinfected   hand hygiene promoted   personal protective equipment utilized   rest/sleep promoted   single patient room provided  Goal: Optimal Comfort and Wellbeing  Outcome: Progressing  Goal: Readiness for Transition of Care  Outcome: Progressing     Problem: Comorbidity Management  Goal: Blood Glucose Levels Within Targeted Range  Outcome: Progressing  Intervention: Monitor and Manage Glycemia  Recent Flowsheet Documentation  Taken 8/2/2024 0118 by Danielle Heart RN  Glycemic Management: blood glucose monitored  Medication Review/Management:   medications reviewed   high-risk medications identified  Goal: Blood Pressure in Desired Range  Outcome: Progressing  Intervention: Maintain Blood Pressure Management  Recent Flowsheet Documentation  Taken 8/2/2024 0118 by Danielle Heart RN  Medication Review/Management:   medications reviewed   high-risk medications identified     Problem: Skin or Soft Tissue Infection  Goal: Absence of Infection Signs and Symptoms  Outcome: Progressing  Intervention: Minimize and Manage Infection Progression  Recent Flowsheet Documentation  Taken 8/2/2024 0118 by Danielle Heart RN  Infection Prevention:   environmental surveillance performed   equipment surfaces disinfected   hand  hygiene promoted   personal protective equipment utilized   rest/sleep promoted   single patient room provided

## 2024-08-03 LAB — GLUCOSE BLDC GLUCOMTR-MCNC: 116 MG/DL (ref 70–99)

## 2024-08-04 ENCOUNTER — PATIENT OUTREACH (OUTPATIENT)
Dept: CARE COORDINATION | Facility: CLINIC | Age: 64
End: 2024-08-04
Payer: COMMERCIAL

## 2024-08-04 NOTE — PROGRESS NOTES
Clinic Care Coordination Contact  Transitions of Care Outreach  Chief Complaint   Patient presents with    Clinic Care Coordination - Post Hospital       Most Recent Admission Date: 7/28/2024   Most Recent Admission Diagnosis: Cellulitis of right upper extremity - L03.113  Dog bite of hand, right, initial encounter - S61.451A, W54.0XXA     Most Recent Discharge Date: 8/2/2024   Most Recent Discharge Diagnosis: Dog bite of hand, right, initial encounter - S61.451A, W54.0XXA  Cellulitis of right upper extremity - L03.113     Transitions of Care Assessment    Discharge Assessment  How are you doing now that you are home?: ok  How are your symptoms? (Red Flag symptoms escalate to triage hotline per guidelines): Improved  Do you know how to contact your clinic care team if you have future questions or changes to your health status? : Yes  Does the patient have their discharge instructions? : Yes  Does the patient have questions regarding their discharge instructions? : No  Were you started on any new medications or were there changes to any of your previous medications? : Yes  Does the patient have all of their medications?: Yes  Do you have questions regarding any of your medications? : No  Do you have all of your needed medical supplies or equipment (DME)?  (i.e. oxygen tank, CPAP, cane, etc.): Yes                  Follow up Plan     Discharge Follow-Up  Discharge follow up appointment scheduled in alignment with recommended follow up timeframe or Transitions of Risk Category? (Low = within 30 days; Moderate= within 14 days; High= within 7 days): Yes    Future Appointments   Date Time Provider Department Center   8/12/2024  4:15 PM [facility]   8/15/2024 11:00 AM [id number]   8/23/2024  8:00 AM Shira Montanez NP [facility]   1/6/2025  7:30 AM Johann Serna DO [facility]       Outpatient Plan as outlined on AVS reviewed with patient.    For any urgent concerns, please contact our 24 hour nurse triage  line: [phone] ([phone])       SAMMI Tan

## 2024-08-06 LAB — BACTERIA TISS BX CULT: NORMAL

## 2024-08-12 ENCOUNTER — TRANSFERRED RECORDS (OUTPATIENT)
Dept: HEALTH INFORMATION MANAGEMENT | Facility: CLINIC | Age: 64
End: 2024-08-12

## 2024-08-15 ENCOUNTER — TRANSFERRED RECORDS (OUTPATIENT)
Dept: HEALTH INFORMATION MANAGEMENT | Facility: CLINIC | Age: 64
End: 2024-08-15

## 2024-08-15 ENCOUNTER — HOSPITAL ENCOUNTER (OUTPATIENT)
Dept: ULTRASOUND IMAGING | Facility: CLINIC | Age: 64
Discharge: HOME | End: 2024-08-15
Attending: INTERNAL MEDICINE | Admitting: INTERNAL MEDICINE
Payer: COMMERCIAL

## 2024-08-15 DIAGNOSIS — K75.81 LIVER CIRRHOSIS SECONDARY TO NASH (H): ICD-10-CM

## 2024-08-15 DIAGNOSIS — K74.60 LIVER CIRRHOSIS SECONDARY TO NASH (H): ICD-10-CM

## 2024-08-15 PROCEDURE — 76705 ECHO EXAM OF ABDOMEN: CPT

## 2024-08-16 ENCOUNTER — TRANSFERRED RECORDS (OUTPATIENT)
Dept: HEALTH INFORMATION MANAGEMENT | Facility: CLINIC | Age: 64
End: 2024-08-16
Payer: COMMERCIAL

## 2024-08-21 ENCOUNTER — OFFICE VISIT (OUTPATIENT)
Dept: PODIATRY | Facility: CLINIC | Age: 64
End: 2024-08-21
Payer: COMMERCIAL

## 2024-08-21 VITALS
BODY MASS INDEX: 34.16 KG/M2 | SYSTOLIC BLOOD PRESSURE: 146 MMHG | WEIGHT: 244 LBS | DIASTOLIC BLOOD PRESSURE: 72 MMHG | HEIGHT: 71 IN

## 2024-08-21 DIAGNOSIS — L97.522 DIABETIC ULCER OF OTHER PART OF LEFT FOOT ASSOCIATED WITH TYPE 2 DIABETES MELLITUS, WITH FAT LAYER EXPOSED (H): ICD-10-CM

## 2024-08-21 DIAGNOSIS — E11.621 DIABETIC ULCER OF OTHER PART OF LEFT FOOT ASSOCIATED WITH TYPE 2 DIABETES MELLITUS, WITH FAT LAYER EXPOSED (H): ICD-10-CM

## 2024-08-21 DIAGNOSIS — E11.42 DIABETIC POLYNEUROPATHY ASSOCIATED WITH TYPE 2 DIABETES MELLITUS (H): ICD-10-CM

## 2024-08-21 DIAGNOSIS — Z89.432 S/P TRANSMETATARSAL AMPUTATION OF FOOT, LEFT (H): Primary | ICD-10-CM

## 2024-08-21 PROCEDURE — 99213 OFFICE O/P EST LOW 20 MIN: CPT | Mod: 24 | Performed by: PODIATRIST

## 2024-08-21 NOTE — LETTER
8/21/2024      Crispin Rojas  [address] [facility] [address]      Dear Colleague,    Thank you for referring your patient, Crispin Rojas, to the [facility]. Please see a copy of my visit note below.     [facility]  CLINIC NOTE    CHIEF COMPLAINT:  left foot    PATIENT HISTORY:  Crispin Rojas is a 64 year old male  who presents for follow up for his left foot. Had a TMA on 6/18/2024. Site healed uneventfully. He was also seen in the hospital when he was admitted for a hand infection. At that time, there was no open lesions to his left foot, but there was a small callus to the plantar first metatarsal. He was advised of this and to slowly increase activity. Since then he hiked three miles and states the submet one site started draining. States now the site has been dry. Is now back to work. Denies pain to the site. Denies N/F/V/C/D.         Review of Systems:  A 10 point review of systems was performed and is positive for that noted above in the patient history.  All other areas are negative.     PAST MEDICAL HISTORY:   Past Medical History:   Diagnosis Date     Antiplatelet or antithrombotic long-term use      Ascending aorta dilatation (H24)      Cerebral artery occlusion with cerebral infarction (H)      Cerebral infarction (H)     2010     Gastroesophageal reflux disease with esophagitis      H/O blood clots 2022     Hyperlipidemia LDL goal <70      Hypertension      Nonalcoholic steatohepatitis 11/30/2022     Numbness and tingling      Obesity      GILA (obstructive sleep apnea) 10/22/2018    CPAP intolerant     PVD (peripheral vascular disease) (H24)     s/p left partial toe amputation     Renal stone      Tremor      Type 2 diabetes mellitus with diabetic polyneuropathy, with long-term current use of insulin (H)         PAST SURGICAL HISTORY:   Past Surgical History:   Procedure Laterality Date     AMPUTATE FOOT Left 8/18/2016     Procedure: AMPUTATE FOOT;  Surgeon: Jack Younger DPM;  Location: [facility]     AMPUTATE TOE(S) Right 2/1/2016    Procedure: AMPUTATE TOE(S);  Surgeon: Rachelle Manriquez DPM, Pod;  Location: [facility]     AMPUTATE TOE(S) Right 8/2/2019    Procedure: Right fourth toe partial amputation for treatment of osteomyelitis.;  Surgeon: Jack Younger DPM;  Location: [facility]     AMPUTATE TOE(S) Left 11/8/2019    Procedure: Left second toe amputation at the metatarsophalangeal joint.;  Surgeon: Rachelle Manriquez DPM, Podiatry/Foot and Ankle Surgery;  Location: [facility] OR     AMPUTATE TOE(S) Right 6/5/2022    Procedure: AMPUTATION OF RIGHT GREAT TOE;  Surgeon: Wili Quiles DPM;  Location: [facility]     AMPUTATE TOE(S) Right 7/28/2022    Procedure: Right foot partial first metatarsal resection;  Surgeon: Tiny Soto DPM;  Location: [facility]     AMPUTATE TOE(S) Left 6/18/2024    Procedure: Left foot transmetatarsal amputation;  Surgeon: Tiny Soto DPM;  Location: [facility]     ANGIOGRAM       BIOPSY BONE FOOT Right 7/24/2022    Procedure: Bone biopsy, right first metatarsal.;  Surgeon: Valentino Molina DPM;  Location: [facility]     COLONOSCOPY       COMBINED CYSTOSCOPY, RETROGRADES, URETEROSCOPY, INSERT STENT Left 8/21/2016    Procedure: COMBINED CYSTOSCOPY, RETROGRADES, URETEROSCOPY, INSERT STENT;  Surgeon: Artemio Valenzuela MD;  Location: [facility]     COMBINED CYSTOSCOPY, RETROGRADES, URETEROSCOPY, LASER HOLMIUM LITHOTRIPSY URETER(S), INSERT STENT Left 10/3/2016    Procedure: COMBINED CYSTOSCOPY, RETROGRADES, URETEROSCOPY, LASER HOLMIUM LITHOTRIPSY URETER(S), INSERT STENT;  Surgeon: Artemio Valenzuela MD;  Location: [facility]     EXTRACORPOREAL SHOCK WAVE LITHOTRIPSY (ESWL) Left 9/8/2016    Procedure: EXTRACORPOREAL SHOCK WAVE LITHOTRIPSY (ESWL);  Surgeon: Artemio Valenzuela MD;  Location: [facility]     INCISION AND DRAINAGE FOOT, COMBINED Right 7/24/2022    Procedure: Incision and drainage, right foot ;  Surgeon:  Valentino Molina DPM;  Location: [facility]     IRRIGATION AND DEBRIDEMENT FOOT, COMBINED Left 10/19/2023    Procedure: Left foot second metatarsal resection , . Left plantar ulcer excisional debridement, down to and including tendon, with closure, site measuring 4 cm x 2 cm x 1 cm;  Surgeon: Tiny Soto DPM;  Location: [facility]     IRRIGATION AND DEBRIDEMENT HAND, COMBINED Right 7/30/2024    Procedure: Irrigation and Debridement of Right Dorsal Hand and wrist;  Surgeon: June Mcdaniels MD;  Location: [facility]     OSTEOTOMY FOOT Right 11/30/2022    Procedure: 1. Right second metatarsal floating osteotomy  2. Right second digit proximal phalanx arthroplasty 3. Right percutaneous flexor tenotomy;  Surgeon: Tiny Soto DPM;  [facility]     OSTEOTOMY FOOT Right 1/19/2023    Procedure: Right foot third metatarsal head excision, ulcer debridement, matatarsal osteotomies;  Surgeon: Tiny Soto DPM;  Location: [facility]     RECESSION GASTROCNEMIUS Right 2/1/2016    Procedure: RECESSION GASTROCNEMIUS;  Surgeon: Rachelle Manriquez DPM, Pod;  [facility]: [facility]        MEDICATIONS:  Reviewed in Epic.     ALLERGIES:    Allergies   Allergen Reactions     Bactrim [Sulfamethoxazole-Trimethoprim] Nausea and Vomiting     Statins Swelling     Other reaction(s): Other (see comments)  SIMCOR caused edema in extremities  Only Simvastatin     Sulfatolamide Nausea and Vomiting     Niacin Swelling     SIMCOR caused edema in extremities     Simvastatin Swelling     SIMCOR caused edema in extremities     Sulfa Antibiotics Nausea        SOCIAL HISTORY:   Social History     Socioeconomic History     Marital status:      Spouse name: Sydney     Number of children: 2     Years of education: Not on file     Highest education level: Master's degree (e.g., MA, MS, Arle[place], MEd, MSW, ELIANA)   Occupational History     Occupation: marketing     Employer: [employer]     Comment: [employer]   Tobacco Use     Smoking status:  Never     Smokeless tobacco: Never   Vaping Use     Vaping status: Never Used   Substance and Sexual Activity     Alcohol use: Yes     Comment: rare---red wine 3x per month     Drug use: Never     Sexual activity: Not Currently     Partners: Female   Other Topics Concern     Parent/sibling w/ CABG, MI or angioplasty before 65F 55M? No   Social History Narrative     Not on file     Social Determinants of Health     Financial Resource Strain: Low Risk  (7/5/2024)    Financial Resource Strain      Within the past 12 months, have you or your family members you live with been unable to get utilities (heat, electricity) when it was really needed?: No   Food Insecurity: Low Risk  (7/5/2024)    Food Insecurity      Within the past 12 months, did you worry that your food would run out before you got money to buy more?: No      Within the past 12 months, did the food you bought just not last and you didn t have money to get more?: No   Transportation Needs: Low Risk  (7/5/2024)    Transportation Needs      Within the past 12 months, has lack of transportation kept you from medical appointments, getting your medicines, non-medical meetings or appointments, work, or from getting things that you need?: No   Physical Activity: Sufficiently Active (7/5/2024)    Exercise Vital Sign      Days of Exercise per Week: 6 days      Minutes of Exercise per Session: 80 min   Stress: No Stress Concern Present (7/5/2024)    [employer] [employer] [employer] - Occupational Stress Questionnaire      Feeling of Stress : Not at all   Social Connections: Socially Integrated (7/5/2024)    Social Connection and Isolation Panel [NHANES]      Frequency of Communication with Friends and Family: Twice a week      Frequency of Social Gatherings with Friends and Family: More than three times a week      Attends [employer] Services: More than 4 times per year      Active Member of Clubs or Organizations: Yes      Attends Club or Organization  "Meetings: More than 4 times per year      Marital Status:    Interpersonal Safety: Low Risk  (7/5/2024)    Interpersonal Safety      Do you feel physically and emotionally safe where you currently live?: Yes      Within the past 12 months, have you been hit, slapped, kicked or otherwise physically hurt by someone?: No      Within the past 12 months, have you been humiliated or emotionally abused in other ways by your partner or ex-partner?: No   Housing Stability: Low Risk  (7/5/2024)    Housing Stability      Do you have housing? : Yes      Are you worried about losing your housing?: No        FAMILY HISTORY:   Family History   Problem Relation Age of Onset     Arthritis Mother         SLE     Connective Tissue Disorder Mother         lupus     Diabetes Mother      Cerebrovascular Disease Mother      Cancer Mother      Diabetes Father      Coronary Artery Disease Father      Chronic Obstructive Pulmonary Disease Father      Diabetes Sister      Diabetes Maternal Grandfather         EXAM:Vitals: BP (!) 146/72   Ht 1.803 m (5' 11\")   Wt 110.7 kg (244 lb)   BMI 34.03 kg/m    BMI= Body mass index is 34.03 kg/m .      General appearance: Patient is alert and fully cooperative with history & exam.  No sign of distress is noted during the visit.      Respiratory: Breathing is regular & unlabored while sitting.      HEENT: Hearing is intact to spoken word.  Speech is clear.  No gross evidence of visual impairment that would impact ambulation.      Dermatologic: Left foot submet one ulcer, new onset. Superficial. Granular. Dry. No cellulitis or probe to deep tissue.      Vascular: Dorsalis pedis and posterior tibial pulses are intact & regular bilaterally.  CFT and skin temperature is normal to both lower extremities.      Neurologic: Lower extremity sensation is diminished, bilateral foot, to light touch.  No evidence of neurological-based weakness or contracture in the lower extremities.       Musculoskeletal: " Patient is ambulatory without an assistive device or brace.  S/p TMA on left foot.     Psychiatric: Affect is pleasant & appropriate.          Final Diagnosis   A.  Left forefoot, amputation:  -Surgical absence of the great and second toe  -Distal volar surface with deep ulcer with adjacent active, purulent osteomyelitis  -Tissues designated as margins without evidence of osteomyelitis     E.  Left foot, third proximal metatarsal, excision:  -No evidence of osteomyelitis       Foot, Left; Tissue   1 Result Note  Culture 1+ Staphylococcus caprae Abnormal    Identification obtained by MALDI-TOF mass spectrometry research use only database. Test characteristics determined and verified by the [facility].   1+ Staphylococcus simulans Abnormal    Susceptibilities not routinely done, refer to antibiogram to view typical susceptibility profiles      This specimen was received on a swab. Results may not be optimal. For maximum sensitivity of detection submit tissue, fluid or needle aspirate.        Resulting Agency: [facility]     Susceptibility     Staphylococcus caprae     JEFFREY     Ciprofloxacin <=0.5 ug/mL Susceptible     Clindamycin 0.25 ug/mL Susceptible     Daptomycin 0.5 ug/mL Susceptible     Doxycycline <=0.5 ug/mL Susceptible     Erythromycin <=0.25 ug/mL Susceptible     Gentamicin <=0.5 ug/mL Susceptible     Levofloxacin 0.25 ug/mL Susceptible     Oxacillin <=0.25 ug/mL Susceptible 1     Tetracycline <=1 ug/mL Susceptible     Trimethoprim/Sulfamethoxazole 1/19 ug/mL Susceptible     Vancomycin 1 ug/mL Susceptible              IMAGING:     IMPRESSION: Normal first through fifth transmetatarsal amputation.  Soft tissue swelling. No acute fracture.    I personally reviewed the images and agree with the reports as stated.      ASSESSMENT:  Left foot TMA done on 6/18/24  Diabetes Mellitus --> hba1c 6.8     MEDICAL DECISION MAKING:   -Discussed all findings with patient. Chart and imaging reviewed.    -Left foot TMA site well healed at this point.   -Unfortunately, has new open ulcer to plantar first metatarsal. Site is superficial, but discussed with him that he must do less activity at this time. Has CMOs now.   -Advised to go back to wearing air cast boot, he agrees.   -Advised to limit walking to working only, but none excessive, such as hiking/exercising at this time.   -Donut offloading pad cut and fitted to orthotic for when wearing regular shoe.   -Follow up in 3 weeks to be reevaluated       Tiny Soto DPM   [facility]                Again, thank you for allowing me to participate in the care of your patient.        Sincerely,        Tiny Soto DPM

## 2024-08-21 NOTE — PROGRESS NOTES
[facility]  CLINIC NOTE    CHIEF COMPLAINT:  left foot    PATIENT HISTORY:  Crispin Rojas is a 64 year old male  who presents for follow up for his left foot. Had a TMA on 6/18/2024. Site healed uneventfully. He was also seen in the hospital when he was admitted for a hand infection. At that time, there was no open lesions to his left foot, but there was a small callus to the plantar first metatarsal. He was advised of this and to slowly increase activity. Since then he hiked three miles and states the submet one site started draining. States now the site has been dry. Is now back to work. Denies pain to the site. Denies N/F/V/C/D.         Review of Systems:  A 10 point review of systems was performed and is positive for that noted above in the patient history.  All other areas are negative.     PAST MEDICAL HISTORY:   Past Medical History:   Diagnosis Date    Antiplatelet or antithrombotic long-term use     Ascending aorta dilatation (H24)     Cerebral artery occlusion with cerebral infarction (H)     Cerebral infarction (H)     2010    Gastroesophageal reflux disease with esophagitis     H/O blood clots 2022    Hyperlipidemia LDL goal <70     Hypertension     Nonalcoholic steatohepatitis 11/30/2022    Numbness and tingling     Obesity     GILA (obstructive sleep apnea) 10/22/2018    CPAP intolerant    PVD (peripheral vascular disease) (H24)     s/p left partial toe amputation    Renal stone     Tremor     Type 2 diabetes mellitus with diabetic polyneuropathy, with long-term current use of insulin (H)         PAST SURGICAL HISTORY:   Past Surgical History:   Procedure Laterality Date    AMPUTATE FOOT Left 8/18/2016    Procedure: AMPUTATE FOOT;  Surgeon: Jack Younger DPM;  Location: [facility]    AMPUTATE TOE(S) Right 2/1/2016    Procedure: AMPUTATE TOE(S);  Surgeon: Rachelle Manriquez DPM, Pod;  Location: [facility]    AMPUTATE TOE(S) Right 8/2/2019    Procedure: Right fourth toe partial  amputation for treatment of osteomyelitis.;  Surgeon: Jack Younger DPM;  Location: [facility]    AMPUTATE TOE(S) Left 11/8/2019    Procedure: Left second toe amputation at the metatarsophalangeal joint.;  Surgeon: Rachelle Manriquez DPM, Podiatry/Foot and Ankle Surgery;  Location: [facility]    AMPUTATE TOE(S) Right 6/5/2022    Procedure: AMPUTATION OF RIGHT GREAT TOE;  Surgeon: Wili Quiles DPM;  Location: [facility]    AMPUTATE TOE(S) Right 7/28/2022    Procedure: Right foot partial first metatarsal resection;  Surgeon: Tiny Soto DPM;  [facility]: [facility]    AMPUTATE TOE(S) Left 6/18/2024    Procedure: Left foot transmetatarsal amputation;  Surgeon: Tiny Soto DPM;  Location: [facility]    ANGIOGRAM      BIOPSY BONE FOOT Right 7/24/2022    Procedure: Bone biopsy, right first metatarsal.;  Surgeon: Valentino Molina DPM;  Location: [facility]    COLONOSCOPY      COMBINED CYSTOSCOPY, RETROGRADES, URETEROSCOPY, INSERT STENT Left 8/21/2016    Procedure: COMBINED CYSTOSCOPY, RETROGRADES, URETEROSCOPY, INSERT STENT;  Surgeon: Artemio Valenzuela MD;  Location: [facility]    COMBINED CYSTOSCOPY, RETROGRADES, URETEROSCOPY, LASER HOLMIUM LITHOTRIPSY URETER(S), INSERT STENT Left 10/3/2016    Procedure: COMBINED CYSTOSCOPY, RETROGRADES, URETEROSCOPY, LASER HOLMIUM LITHOTRIPSY URETER(S), INSERT STENT;  Surgeon: Artemio Valenzuela MD;  [facility]    EXTRACORPOREAL SHOCK WAVE LITHOTRIPSY (ESWL) Left 9/8/2016    Procedure: EXTRACORPOREAL SHOCK WAVE LITHOTRIPSY (ESWL);  Surgeon: Artemio Valenzuela MD;  Location: [facility]    INCISION AND DRAINAGE FOOT, COMBINED Right 7/24/2022    Procedure: Incision and drainage, right foot ;  Surgeon: Valentino Molina DPM;  [facility]: [facility]    IRRIGATION AND DEBRIDEMENT FOOT, COMBINED Left 10/19/2023    Procedure: Left foot second metatarsal resection , . Left plantar ulcer excisional debridement, down to and including tendon, with closure, site measuring 4 cm x 2 cm x 1 cm;   Surgeon: Tiny Soto DPM;  [facility]: [facility]    IRRIGATION AND DEBRIDEMENT HAND, COMBINED Right 7/30/2024    Procedure: Irrigation and Debridement of Right Dorsal Hand and wrist;  Surgeon: June Mcdaniels MD;  Location: [facility]    OSTEOTOMY FOOT Right 11/30/2022    Procedure: 1. Right second metatarsal floating osteotomy  2. Right second digit proximal phalanx arthroplasty 3. Right percutaneous flexor tenotomy;  Surgeon: Tiny Soto DPM;  [facility]    OSTEOTOMY FOOT Right 1/19/2023    Procedure: Right foot third metatarsal head excision, ulcer debridement, matatarsal osteotomies;  Surgeon: iTny Soto DPM;  Location: [facility]    RECESSION GASTROCNEMIUS Right 2/1/2016    Procedure: RECESSION GASTROCNEMIUS;  Surgeon: Rachelle Manriquez DPM, Pod;  Location[facility]        MEDICATIONS:  Reviewed in Epic.     ALLERGIES:    Allergies   Allergen Reactions    Bactrim [Sulfamethoxazole-Trimethoprim] Nausea and Vomiting    Statins Swelling     Other reaction(s): Other (see comments)  SIMCOR caused edema in extremities  Only Simvastatin    Sulfatolamide Nausea and Vomiting    Niacin Swelling     SIMCOR caused edema in extremities    Simvastatin Swelling     SIMCOR caused edema in extremities    Sulfa Antibiotics Nausea        SOCIAL HISTORY:   Social History     Socioeconomic History    Marital status:      Spouse name: Sydney    Number of children: 2    Years of education: Not on file    Highest education level: Master's degree (e.g., MA, MS, [place], MEd, MSW, ELIANA)   Occupational History    Occupation: marketing     Employer: [employer]     Comment: [employer]   Tobacco Use    Smoking status: Never    Smokeless tobacco: Never   Vaping Use    Vaping status: Never Used   Substance and Sexual Activity    Alcohol use: Yes     Comment: rare---red wine 3x per month    Drug use: Never    Sexual activity: Not Currently     Partners: Female   Other Topics Concern    Parent/sibling w/ CABG, MI or  angioplasty before 65F 55M? No   Social History Narrative    Not on file     Social Determinants of Health     Financial Resource Strain: Low Risk  (7/5/2024)    Financial Resource Strain     Within the past 12 months, have you or your family members you live with been unable to get utilities (heat, electricity) when it was really needed?: No   Food Insecurity: Low Risk  (7/5/2024)    Food Insecurity     Within the past 12 months, did you worry that your food would run out before you got money to buy more?: No     Within the past 12 months, did the food you bought just not last and you didn t have money to get more?: No   Transportation Needs: Low Risk  (7/5/2024)    Transportation Needs     Within the past 12 months, has lack of transportation kept you from medical appointments, getting your medicines, non-medical meetings or appointments, work, or from getting things that you need?: No   Physical Activity: Sufficiently Active (7/5/2024)    Exercise Vital Sign     Days of Exercise per Week: 6 days     Minutes of Exercise per Session: 80 min   Stress: No Stress Concern Present (7/5/2024)    [employer] - Occupational Stress Questionnaire     Feeling of Stress : Not at all   Social Connections: Socially Integrated (7/5/2024)    Social Connection and Isolation Panel [NHANES]     Frequency of Communication with Friends and Family: Twice a week     Frequency of Social Gatherings with Friends and Family: More than three times a week     Attends Zoroastrianism Services: More than 4 times per year     Active Member of Clubs or Organizations: Yes     Attends Club or Organization Meetings: More than 4 times per year     Marital Status:    Interpersonal Safety: Low Risk  (7/5/2024)    Interpersonal Safety     Do you feel physically and emotionally safe where you currently live?: Yes     Within the past 12 months, have you been hit, slapped, kicked or otherwise physically hurt by someone?: No      "Within the past 12 months, have you been humiliated or emotionally abused in other ways by your partner or ex-partner?: No   Housing Stability: Low Risk  (7/5/2024)    Housing Stability     Do you have housing? : Yes     Are you worried about losing your housing?: No        FAMILY HISTORY:   Family History   Problem Relation Age of Onset    Arthritis Mother         SLE    Connective Tissue Disorder Mother         lupus    Diabetes Mother     Cerebrovascular Disease Mother     Cancer Mother     Diabetes Father     Coronary Artery Disease Father     Chronic Obstructive Pulmonary Disease Father     Diabetes Sister     Diabetes Maternal Grandfather         EXAM:Vitals: BP (!) 146/72   Ht 1.803 m (5' 11\")   Wt 110.7 kg (244 lb)   BMI 34.03 kg/m    BMI= Body mass index is 34.03 kg/m .      General appearance: Patient is alert and fully cooperative with history & exam.  No sign of distress is noted during the visit.      Respiratory: Breathing is regular & unlabored while sitting.      HEENT: Hearing is intact to spoken word.  Speech is clear.  No gross evidence of visual impairment that would impact ambulation.      Dermatologic: Left foot submet one ulcer, new onset. Superficial. Granular. Dry. No cellulitis or probe to deep tissue.      Vascular: Dorsalis pedis and posterior tibial pulses are intact & regular bilaterally.  CFT and skin temperature is normal to both lower extremities.      Neurologic: Lower extremity sensation is diminished, bilateral foot, to light touch.  No evidence of neurological-based weakness or contracture in the lower extremities.       Musculoskeletal: Patient is ambulatory without an assistive device or brace.  S/p TMA on left foot.     Psychiatric: Affect is pleasant & appropriate.          Final Diagnosis   A.  Left forefoot, amputation:  -Surgical absence of the great and second toe  -Distal volar surface with deep ulcer with adjacent active, purulent osteomyelitis  -Tissues designated " as margins without evidence of osteomyelitis     E.  Left foot, third proximal metatarsal, excision:  -No evidence of osteomyelitis       Foot, Left; Tissue   1 Result Note  Culture 1+ Staphylococcus caprae Abnormal    Identification obtained by MALDI-TOF mass spectrometry research use only database. Test characteristics determined and verified by the [facility].   1+ Staphylococcus simulans Abnormal    Susceptibilities not routinely done, refer to antibiogram to view typical susceptibility profiles      This specimen was received on a swab. Results may not be optimal. For maximum sensitivity of detection submit tissue, fluid or needle aspirate.        Resulting Agency: [facility]     Susceptibility     Staphylococcus caprae     JEFFREY     Ciprofloxacin <=0.5 ug/mL Susceptible     Clindamycin 0.25 ug/mL Susceptible     Daptomycin 0.5 ug/mL Susceptible     Doxycycline <=0.5 ug/mL Susceptible     Erythromycin <=0.25 ug/mL Susceptible     Gentamicin <=0.5 ug/mL Susceptible     Levofloxacin 0.25 ug/mL Susceptible     Oxacillin <=0.25 ug/mL Susceptible 1     Tetracycline <=1 ug/mL Susceptible     Trimethoprim/Sulfamethoxazole 1/19 ug/mL Susceptible     Vancomycin 1 ug/mL Susceptible              IMAGING:     IMPRESSION: Normal first through fifth transmetatarsal amputation.  Soft tissue swelling. No acute fracture.    I personally reviewed the images and agree with the reports as stated.      ASSESSMENT:  Left foot TMA done on 6/18/24  Diabetes Mellitus --> hba1c 6.8     MEDICAL DECISION MAKING:   -Discussed all findings with patient. Chart and imaging reviewed.   -Left foot TMA site well healed at this point.   -Unfortunately, has new open ulcer to plantar first metatarsal. Site is superficial, but discussed with him that he must do less activity at this time. Has CMOs now.   -Advised to go back to wearing air cast boot, he agrees.   -Advised to limit walking to working only, but none excessive,  such as hiking/exercising at this time.   -Donut offloading pad cut and fitted to orthotic for when wearing regular shoe.   -Follow up in 3 weeks to be reevaluated       Tiny Soto DPM   [facility]

## 2024-08-23 ENCOUNTER — VIRTUAL VISIT (OUTPATIENT)
Dept: ENDOCRINOLOGY | Facility: CLINIC | Age: 64
End: 2024-08-23
Payer: COMMERCIAL

## 2024-08-23 DIAGNOSIS — E11.42 TYPE 2 DIABETES MELLITUS WITH PERIPHERAL NEUROPATHY (H): Primary | ICD-10-CM

## 2024-08-23 PROCEDURE — 99214 OFFICE O/P EST MOD 30 MIN: CPT | Mod: 95 | Performed by: NURSE PRACTITIONER

## 2024-08-23 PROCEDURE — 95251 CONT GLUC MNTR ANALYSIS I&R: CPT | Performed by: NURSE PRACTITIONER

## 2024-08-23 PROCEDURE — G2211 COMPLEX E/M VISIT ADD ON: HCPCS | Mod: 95 | Performed by: NURSE PRACTITIONER

## 2024-08-23 RX ORDER — FLASH GLUCOSE SENSOR
KIT MISCELLANEOUS
COMMUNITY
Start: 2024-08-16

## 2024-08-23 NOTE — LETTER
"8/23/2024      Crispin Rojas  [address] [facility] [address]      Dear Colleague,    Thank you for referring your patient, Crispin Rojas, to the [facility] [place]. Please see a copy of my visit note below.    [facility] ENDOCRINOLOGY    Diabetes Note 8/23/2024    Crispin Rojas, 1960, [id number]          Reason for visit      1. Type 2 diabetes mellitus with peripheral neuropathy (H)        HPI     Crispin Rojas is a very pleasant 64 year old old male who presents for follow up.  SUMMARY:    Judie is seen today in follow-up for DM 2. His current A1c is 6.8 and quite improved from his previous of 7.9    I believe that this is reflective of some significant hypoglycemia that has been occurring overnight. He reports that he doesn't feel lows until he is in the 50s. He notes that if he is low when he gets up (usually around 0330) if he is low he \"doesn't worry about it because he is just going to eat breakfast anyway). He is using the [employer] CGM, which was downloaded and data reviewed.    TIR was 66%, Above 19% and Below, 15%. Goal for lows is only 4%. GMI was 6.3 and certainly reflective of the extra time spent below.    He takes Tresiba with a range between 35 and 55 units daily. He will take 35 units when he is more active, and 55 when less. He is has been far less active until recently, as he was Hospitalized for a dog bite to the hand that became infected. He didn't really make and adjustment to his dosing thus far.  He generally eats dinner about 1900 and then goes to bed. He reports that most of what he eats is protein and veggies, so an extra protein snack at  wouldn't be worthwhile.     He remains on Metformin XR, 1000 mg BID, Novolog with a CHO ratio of 1:9, and Ozempic, 2 mg weekly.     Renal function remains within normal range.     Blood glucose data:      Past Medical History     Patient Active Problem List   Diagnosis     Calculus of kidney     Chronic " left shoulder pain     Type 2 diabetes mellitus with peripheral neuropathy (H)     Hyperlipidemia LDL goal <70     Essential hypertension     Right bundle branch block     GILA (obstructive sleep apnea)     Diabetic ulcer of lower extremity (H)     PVD (peripheral vascular disease) (H24)     Scoliosis of thoracic spine, unspecified scoliosis type     Other sequelae following unspecified cerebrovascular disease     Cervical pain     Bilateral thoracic back pain     Osteomyelitis of great toe of right foot (H)     Acute deep vein thrombosis (DVT) of tibial vein of right lower extremity (H)     Abnormal CT of liver     Benign neoplasm of transverse colon     Duodenitis     Hemorrhoids     Polyp of colon     Acute osteomyelitis of right foot (H)     Essential tremor     Nonalcoholic steatohepatitis     Otalgia of left ear     Shoulder pain     Liver cirrhosis secondary to SNYDER (H)     Gastro-esophageal reflux disease with esophagitis     Chronic osteomyelitis of right foot (H)     Chronic foot ulcer with fat layer exposed, left (H)     Diabetic ulcer of left foot with fat layer exposed (H)     Class 2 severe obesity due to excess calories with serious comorbidity in adult (H)     Osteomyelitis of left foot, unspecified type (H)     Diabetic ulcer of left midfoot associated with diabetes mellitus of other type, with bone involvement without evidence of necrosis (H)     Cellulitis of right upper extremity     Dog bite of hand, right, initial encounter        Family History       family history includes Arthritis in his mother; Cancer in his mother; Cerebrovascular Disease in his mother; Chronic Obstructive Pulmonary Disease in his father; Connective Tissue Disorder in his mother; Coronary Artery Disease in his father; Diabetes in his father, maternal grandfather, mother, and sister.    Social History      reports that he has never smoked. He has never used smokeless tobacco. He reports current alcohol use. He reports that  "he does not use drugs.      Review of Systems     Patient has no polyuria or polydipsia, no chest pain, dyspnea or TIA's, no numbness, tingling or pain in extremities  Remainder negative except as noted in HPI.    Vital Signs     There were no vitals taken for this visit.  Wt Readings from Last 3 Encounters:   08/21/24 110.7 kg (244 lb)   07/28/24 110.7 kg (244 lb)   07/10/24 106.6 kg (235 lb)       Physical Exam     Constitutional:  Well developed, Well nourished  HENT:  Normocephalic,   Neck: normal in appearance  Eyes:  PERRL, Conjunctiva pink  Respiratory:  No respiratory distress  Skin: No acanthosis nigricans, lipoatrophy or lipodystrophy  Neurologic:  Alert & oriented x 3, nonfocal  Psychiatric:  Affect, Mood, Insight appropriate        Assessment     1. Type 2 diabetes mellitus with peripheral neuropathy (H)        Plan     Pat is going to work on a better Tresiba dose now that he is moving more with intention. No other changes today. Follow-up with me as scheduled.         Shira Montanez NP  [facility]  8/23/2024  8:12 AM      Lab Results     No results found for: \"HGBA1C\", \"CREATININE\", \"MICROALBUR\"    Cholesterol   Date Value Ref Range Status   07/05/2024 178 <200 mg/dL Final   06/22/2021 95 <200 mg/dL Final     HDL Cholesterol   Date Value Ref Range Status   06/22/2021 42 >39 mg/dL Final     Direct Measure HDL   Date Value Ref Range Status   07/05/2024 35 (L) >=40 mg/dL Final     Triglycerides   Date Value Ref Range Status   07/05/2024 160 (H) <150 mg/dL Final   06/22/2021 42 <150 mg/dL Final     Comment:     Fasting specimen       [unfilled]      Current Medications     Outpatient Medications Prior to Visit   Medication Sig Dispense Refill     acetaminophen (TYLENOL) 325 MG tablet Take 3 tablets (975 mg) by mouth every 6 hours as needed for mild pain or other (and adjunct with moderate or severe pain or per patient request) 100 tablet 0     amLODIPine (NORVASC) 5 MG tablet Take 2 tablets (10 mg) by " mouth daily 90 tablet 4     atorvastatin (LIPITOR) 40 MG tablet Take 40 mg by mouth at bedtime       calcium carbonate (OS-NARA) 500 MG tablet Take 1 tablet by mouth 2 times daily       Cholecalciferol (VITAMIN D3) 25 MCG (1000 UT) CAPS Take 1,000 Units by mouth daily        Co-Enzyme Q10 100 MG CAPS Take 100 mg by mouth daily        Continuous Glucose Sensor (FREESTYLE LUCERO 14 DAY SENSOR) MIS every 14 days.       ELIQUIS ANTICOAGULANT 5 MG tablet Take 1 tablet (5 mg) by mouth 2 times daily 180 tablet 4     ferrous sulfate (FEROSUL) 325 (65 Fe) MG tablet Take 325 mg by mouth daily (with breakfast)       gabapentin (NEURONTIN) 100 MG capsule Take 400 mg by mouth 3 times daily       gentamicin (GARAMYCIN) 0.1 % external ointment Apply topically as needed       hydrochlorothiazide (MICROZIDE) 12.5 MG capsule Take 1 capsule (12.5 mg) by mouth every morning 90 capsule 4     insulin aspart (NOVOLOG PEN) 100 UNIT/ML pen Inject subcutaneously 3 times daily (with meals) Inject 1 unit of insulin for every 3 grams of carbs       insulin degludec (TRESIBA) 100 UNIT/ML pen Inject 35-55 Units Subcutaneous every morning       ketoconazole (NIZORAL) 2 % external shampoo Apply topically twice a week On Tuesday and Friday       lisinopril (ZESTRIL) 40 MG tablet Take 1 tablet (40 mg) by mouth daily 90 tablet 4     magnesium gluconate (MAGONATE) 500 MG tablet Take 500 mg by mouth 3 times daily       metFORMIN (GLUCOPHAGE XR) 500 MG 24 hr tablet TAKE 2 TABLETS(1000 MG) BY MOUTH TWICE DAILY 360 tablet 0     Multiple Vitamins-Minerals (MULTIVITAMIN PO) Take 1 tablet by mouth daily        omeprazole (PRILOSEC) 40 MG DR capsule Take 40 mg by mouth daily       propranolol (INDERAL) 20 MG tablet Take 20 mg by mouth 2 times daily       Semaglutide, 2 MG/DOSE, (OZEMPIC, 2 MG/DOSE,) 8 MG/3ML pen Inject 2 mg subcutaneously every 7 days On Wednesday       senna-docusate (SENOKOT-S/PERICOLACE) 8.6-50 MG tablet Take 1 tablet by mouth 2 times daily  "as needed for constipation 30 tablet 0     tadalafil (CIALIS) 5 MG tablet TAKE 1 TABLET BY MOUTH EVERY DAY AS NEEDED one hour before intercourse 30 tablet 1     No facility-administered medications prior to visit.             Visit Start  time:    Visit stop time:     Virtual Visit Details    Type of service:  Video Visit     Originating Location (pt. Location): {video visit patient location:606771::\"Home\"}  {PROVIDER LOCATION On-site should be selected for visits conducted from your clinic location or adjoining [facility] hospital, academic office, or other nearby [facility] building. Off-site should be selected for all other provider locations, including home:705607}  Distant Location (provider location):  {virtual location provider:197783}  Platform used for Video Visit: {Virtual Visit Platforms:930047::\"[employer]\"}                      Again, thank you for allowing me to participate in the care of your patient.        Sincerely,        Shira Montanez NP  "

## 2024-08-23 NOTE — PROGRESS NOTES
"[facility]    Diabetes Note 8/23/2024    Crispin Rojas, 1960, [id number]          Reason for visit      1. Type 2 diabetes mellitus with peripheral neuropathy (H)        HPI     Crispin Rojas is a very pleasant 64 year old old male who presents for follow up.  SUMMARY:    Judie is seen today in follow-up for DM 2. His current A1c is 6.8 and quite improved from his previous of 7.9    I believe that this is reflective of some significant hypoglycemia that has been occurring overnight. He reports that he doesn't feel lows until he is in the 50s. He notes that if he is low when he gets up (usually around 0330) if he is low he \"doesn't worry about it because he is just going to eat breakfast anyway). He is using the [employer] CGM, which was downloaded and data reviewed.    TIR was 66%, Above 19% and Below, 15%. Goal for lows is only 4%. GMI was 6.3 and certainly reflective of the extra time spent below.    He takes Tresiba with a range between 35 and 55 units daily. He will take 35 units when he is more active, and 55 when less. He is has been far less active until recently, as he was Hospitalized for a dog bite to the hand that became infected. He didn't really make and adjustment to his dosing thus far.  He generally eats dinner about 1900 and then goes to bed. He reports that most of what he eats is protein and veggies, so an extra protein snack at HS wouldn't be worthwhile.     He remains on Metformin XR, 1000 mg BID, Novolog with a CHO ratio of 1:9, and Ozempic, 2 mg weekly.     Renal function remains within normal range.     Blood glucose data:      Past Medical History     Patient Active Problem List   Diagnosis    Calculus of kidney    Chronic left shoulder pain    Type 2 diabetes mellitus with peripheral neuropathy (H)    Hyperlipidemia LDL goal <70    Essential hypertension    Right bundle branch block    GILA (obstructive sleep apnea)    Diabetic ulcer of lower extremity (H)    PVD " (peripheral vascular disease) (H24)    Scoliosis of thoracic spine, unspecified scoliosis type    Other sequelae following unspecified cerebrovascular disease    Cervical pain    Bilateral thoracic back pain    Osteomyelitis of great toe of right foot (H)    Acute deep vein thrombosis (DVT) of tibial vein of right lower extremity (H)    Abnormal CT of liver    Benign neoplasm of transverse colon    Duodenitis    Hemorrhoids    Polyp of colon    Acute osteomyelitis of right foot (H)    Essential tremor    Nonalcoholic steatohepatitis    Otalgia of left ear    Shoulder pain    Liver cirrhosis secondary to SNYDER (H)    Gastro-esophageal reflux disease with esophagitis    Chronic osteomyelitis of right foot (H)    Chronic foot ulcer with fat layer exposed, left (H)    Diabetic ulcer of left foot with fat layer exposed (H)    Class 2 severe obesity due to excess calories with serious comorbidity in adult (H)    Osteomyelitis of left foot, unspecified type (H)    Diabetic ulcer of left midfoot associated with diabetes mellitus of other type, with bone involvement without evidence of necrosis (H)    Cellulitis of right upper extremity    Dog bite of hand, right, initial encounter        Family History       family history includes Arthritis in his mother; Cancer in his mother; Cerebrovascular Disease in his mother; Chronic Obstructive Pulmonary Disease in his father; Connective Tissue Disorder in his mother; Coronary Artery Disease in his father; Diabetes in his father, maternal grandfather, mother, and sister.    Social History      reports that he has never smoked. He has never used smokeless tobacco. He reports current alcohol use. He reports that he does not use drugs.      Review of Systems     Patient has no polyuria or polydipsia, no chest pain, dyspnea or TIA's, no numbness, tingling or pain in extremities  Remainder negative except as noted in HPI.    Vital Signs     There were no vitals taken for this visit.  Wt  "Readings from Last 3 Encounters:   08/21/24 110.7 kg (244 lb)   07/28/24 110.7 kg (244 lb)   07/10/24 106.6 kg (235 lb)       Physical Exam     Constitutional:  Well developed, Well nourished  HENT:  Normocephalic,   Neck: normal in appearance  Eyes:  PERRL, Conjunctiva pink  Respiratory:  No respiratory distress  Skin: No acanthosis nigricans, lipoatrophy or lipodystrophy  Neurologic:  Alert & oriented x 3, nonfocal  Psychiatric:  Affect, Mood, Insight appropriate        Assessment     1. Type 2 diabetes mellitus with peripheral neuropathy (H)        Plan     Pat is going to work on a better Tresiba dose now that he is moving more with intention. No other changes today. Follow-up with me as scheduled.         Shira Montanez NP  [facility]  8/23/2024  8:12 AM      Lab Results     No results found for: \"HGBA1C\", \"CREATININE\", \"MICROALBUR\"    Cholesterol   Date Value Ref Range Status   07/05/2024 178 <200 mg/dL Final   06/22/2021 95 <200 mg/dL Final     HDL Cholesterol   Date Value Ref Range Status   06/22/2021 42 >39 mg/dL Final     Direct Measure HDL   Date Value Ref Range Status   07/05/2024 35 (L) >=40 mg/dL Final     Triglycerides   Date Value Ref Range Status   07/05/2024 160 (H) <150 mg/dL Final   06/22/2021 42 <150 mg/dL Final     Comment:     Fasting specimen       [unfilled]      Current Medications     Outpatient Medications Prior to Visit   Medication Sig Dispense Refill    acetaminophen (TYLENOL) 325 MG tablet Take 3 tablets (975 mg) by mouth every 6 hours as needed for mild pain or other (and adjunct with moderate or severe pain or per patient request) 100 tablet 0    amLODIPine (NORVASC) 5 MG tablet Take 2 tablets (10 mg) by mouth daily 90 tablet 4    atorvastatin (LIPITOR) 40 MG tablet Take 40 mg by mouth at bedtime      calcium carbonate (OS-NARA) 500 MG tablet Take 1 tablet by mouth 2 times daily      Cholecalciferol (VITAMIN D3) 25 MCG (1000 UT) CAPS Take 1,000 Units by mouth daily       " Co-Enzyme Q10 100 MG CAPS Take 100 mg by mouth daily       Continuous Glucose Sensor (FREESTYLE LUCERO 14 DAY SENSOR) MIS every 14 days.      ELIQUIS ANTICOAGULANT 5 MG tablet Take 1 tablet (5 mg) by mouth 2 times daily 180 tablet 4    ferrous sulfate (FEROSUL) 325 (65 Fe) MG tablet Take 325 mg by mouth daily (with breakfast)      gabapentin (NEURONTIN) 100 MG capsule Take 400 mg by mouth 3 times daily      gentamicin (GARAMYCIN) 0.1 % external ointment Apply topically as needed      hydrochlorothiazide (MICROZIDE) 12.5 MG capsule Take 1 capsule (12.5 mg) by mouth every morning 90 capsule 4    insulin aspart (NOVOLOG PEN) 100 UNIT/ML pen Inject subcutaneously 3 times daily (with meals) Inject 1 unit of insulin for every 3 grams of carbs      insulin degludec (TRESIBA) 100 UNIT/ML pen Inject 35-55 Units Subcutaneous every morning      ketoconazole (NIZORAL) 2 % external shampoo Apply topically twice a week On Tuesday and Friday      lisinopril (ZESTRIL) 40 MG tablet Take 1 tablet (40 mg) by mouth daily 90 tablet 4    magnesium gluconate (MAGONATE) 500 MG tablet Take 500 mg by mouth 3 times daily      metFORMIN (GLUCOPHAGE XR) 500 MG 24 hr tablet TAKE 2 TABLETS(1000 MG) BY MOUTH TWICE DAILY 360 tablet 0    Multiple Vitamins-Minerals (MULTIVITAMIN PO) Take 1 tablet by mouth daily       omeprazole (PRILOSEC) 40 MG DR capsule Take 40 mg by mouth daily      propranolol (INDERAL) 20 MG tablet Take 20 mg by mouth 2 times daily      Semaglutide, 2 MG/DOSE, (OZEMPIC, 2 MG/DOSE,) 8 MG/3ML pen Inject 2 mg subcutaneously every 7 days On Wednesday      senna-docusate (SENOKOT-S/PERICOLACE) 8.6-50 MG tablet Take 1 tablet by mouth 2 times daily as needed for constipation 30 tablet 0    tadalafil (CIALIS) 5 MG tablet TAKE 1 TABLET BY MOUTH EVERY DAY AS NEEDED one hour before intercourse 30 tablet 1     No facility-administered medications prior to visit.             Visit Start  time:0800    Visit stop time: 0820    Virtual Visit  Details    Type of service:  Video Visit     Originating Location (pt. Location): Home    Distant Location (provider location):  Off-site  Platform used for Video Visit: Jennifer

## 2024-08-27 ENCOUNTER — TELEPHONE (OUTPATIENT)
Dept: PODIATRY | Facility: CLINIC | Age: 64
End: 2024-08-27
Payer: COMMERCIAL

## 2024-08-27 ENCOUNTER — MEDICAL CORRESPONDENCE (OUTPATIENT)
Dept: HEALTH INFORMATION MANAGEMENT | Facility: CLINIC | Age: 64
End: 2024-08-27
Payer: COMMERCIAL

## 2024-08-29 ENCOUNTER — TELEPHONE (OUTPATIENT)
Dept: FAMILY MEDICINE | Facility: CLINIC | Age: 64
End: 2024-08-29
Payer: COMMERCIAL

## 2024-08-29 NOTE — TELEPHONE ENCOUNTER
Summary:    Patient is due/failing the following:   BP CHECK    Reviewed:    [] CARE EVERYWHERE  [] LAST OV NOTE   [] FYI TAB  [] MYCHART ACTIVE?  [] LAST PANEL ENCOUNTER  [] FUTURE APPTS  [] IMMUNIZATIONS  [] Media Tab            Action needed:   Patient needs nurse only appointment.    Type of outreach:    Sent High[employer] Mobilehart message.                                                                               Shania Warren/KOBI  D[place]---[facility]

## 2024-09-04 ENCOUNTER — APPOINTMENT (OUTPATIENT)
Dept: ULTRASOUND IMAGING | Facility: CLINIC | Age: 64
End: 2024-09-04
Attending: EMERGENCY MEDICINE
Payer: COMMERCIAL

## 2024-09-04 ENCOUNTER — NURSE TRIAGE (OUTPATIENT)
Dept: FAMILY MEDICINE | Facility: CLINIC | Age: 64
End: 2024-09-04
Payer: COMMERCIAL

## 2024-09-04 ENCOUNTER — HOSPITAL ENCOUNTER (INPATIENT)
Facility: CLINIC | Age: 64
LOS: 3 days | Discharge: HOME | End: 2024-09-09
Attending: EMERGENCY MEDICINE | Admitting: HOSPITALIST
Payer: COMMERCIAL

## 2024-09-04 DIAGNOSIS — Z90.49 S/P LAPAROSCOPIC CHOLECYSTECTOMY: Primary | ICD-10-CM

## 2024-09-04 DIAGNOSIS — K81.9 CHOLECYSTITIS: ICD-10-CM

## 2024-09-04 DIAGNOSIS — R10.11 RUQ ABDOMINAL PAIN: ICD-10-CM

## 2024-09-04 LAB
ALBUMIN SERPL BCG-MCNC: 3.9 G/DL (ref 3.5–5.2)
ALBUMIN UR-MCNC: 70 MG/DL
ALP SERPL-CCNC: 61 U/L (ref 40–150)
ALT SERPL W P-5'-P-CCNC: 22 U/L (ref 0–70)
ANION GAP SERPL CALCULATED.3IONS-SCNC: 13 MMOL/L (ref 7–15)
APPEARANCE UR: CLEAR
AST SERPL W P-5'-P-CCNC: 22 U/L (ref 0–45)
BASOPHILS # BLD AUTO: 0 10E3/UL (ref 0–0.2)
BASOPHILS NFR BLD AUTO: 0 %
BILIRUB SERPL-MCNC: 1.6 MG/DL
BILIRUB UR QL STRIP: NEGATIVE
BUN SERPL-MCNC: 16.1 MG/DL (ref 8–23)
CALCIUM SERPL-MCNC: 8.7 MG/DL (ref 8.8–10.4)
CHLORIDE SERPL-SCNC: 99 MMOL/L (ref 98–107)
COLOR UR AUTO: YELLOW
CREAT SERPL-MCNC: 1.08 MG/DL (ref 0.67–1.17)
EGFRCR SERPLBLD CKD-EPI 2021: 78 ML/MIN/1.73M2
EOSINOPHIL # BLD AUTO: 0 10E3/UL (ref 0–0.7)
EOSINOPHIL NFR BLD AUTO: 0 %
ERYTHROCYTE [DISTWIDTH] IN BLOOD BY AUTOMATED COUNT: 14.2 % (ref 10–15)
GLUCOSE BLDC GLUCOMTR-MCNC: 165 MG/DL (ref 70–99)
GLUCOSE BLDC GLUCOMTR-MCNC: 200 MG/DL (ref 70–99)
GLUCOSE SERPL-MCNC: 164 MG/DL (ref 70–99)
GLUCOSE UR STRIP-MCNC: 30 MG/DL
HCO3 SERPL-SCNC: 22 MMOL/L (ref 22–29)
HCT VFR BLD AUTO: 38.9 % (ref 40–53)
HGB BLD-MCNC: 12.8 G/DL (ref 13.3–17.7)
HGB UR QL STRIP: NEGATIVE
HOLD SPECIMEN: NORMAL
IMM GRANULOCYTES # BLD: 0.1 10E3/UL
IMM GRANULOCYTES NFR BLD: 1 %
KETONES UR STRIP-MCNC: NEGATIVE MG/DL
LACTATE SERPL-SCNC: 1.5 MMOL/L (ref 0.7–2)
LEUKOCYTE ESTERASE UR QL STRIP: NEGATIVE
LIPASE SERPL-CCNC: 28 U/L (ref 13–60)
LYMPHOCYTES # BLD AUTO: 1.4 10E3/UL (ref 0.8–5.3)
LYMPHOCYTES NFR BLD AUTO: 9 %
MCH RBC QN AUTO: 28.6 PG (ref 26.5–33)
MCHC RBC AUTO-ENTMCNC: 32.9 G/DL (ref 31.5–36.5)
MCV RBC AUTO: 87 FL (ref 78–100)
MONOCYTES # BLD AUTO: 1.1 10E3/UL (ref 0–1.3)
MONOCYTES NFR BLD AUTO: 7 %
MUCOUS THREADS #/AREA URNS LPF: PRESENT /LPF
NEUTROPHILS # BLD AUTO: 12.9 10E3/UL (ref 1.6–8.3)
NEUTROPHILS NFR BLD AUTO: 83 %
NITRATE UR QL: NEGATIVE
NRBC # BLD AUTO: 0 10E3/UL
NRBC BLD AUTO-RTO: 0 /100
PH UR STRIP: 6 [PH] (ref 5–7)
PLATELET # BLD AUTO: 192 10E3/UL (ref 150–450)
POTASSIUM SERPL-SCNC: 3.7 MMOL/L (ref 3.4–5.3)
PROT SERPL-MCNC: 7.7 G/DL (ref 6.4–8.3)
RBC # BLD AUTO: 4.48 10E6/UL (ref 4.4–5.9)
RBC URINE: 1 /HPF
SODIUM SERPL-SCNC: 134 MMOL/L (ref 135–145)
SP GR UR STRIP: 1.03 (ref 1–1.03)
SQUAMOUS EPITHELIAL: <1 /HPF
UROBILINOGEN UR STRIP-MCNC: NORMAL MG/DL
WBC # BLD AUTO: 15.5 10E3/UL (ref 4–11)
WBC URINE: <1 /HPF

## 2024-09-04 PROCEDURE — 99223 1ST HOSP IP/OBS HIGH 75: CPT | Performed by: PHYSICIAN ASSISTANT

## 2024-09-04 PROCEDURE — 258N000003 HC RX IP 258 OP 636: Performed by: PHYSICIAN ASSISTANT

## 2024-09-04 PROCEDURE — 80053 COMPREHEN METABOLIC PANEL: CPT | Performed by: EMERGENCY MEDICINE

## 2024-09-04 PROCEDURE — 83605 ASSAY OF LACTIC ACID: CPT | Performed by: EMERGENCY MEDICINE

## 2024-09-04 PROCEDURE — 85025 COMPLETE CBC W/AUTO DIFF WBC: CPT | Performed by: EMERGENCY MEDICINE

## 2024-09-04 PROCEDURE — 36415 COLL VENOUS BLD VENIPUNCTURE: CPT | Performed by: EMERGENCY MEDICINE

## 2024-09-04 PROCEDURE — 83690 ASSAY OF LIPASE: CPT | Performed by: EMERGENCY MEDICINE

## 2024-09-04 PROCEDURE — 250N000013 HC RX MED GY IP 250 OP 250 PS 637: Performed by: PHYSICIAN ASSISTANT

## 2024-09-04 PROCEDURE — 96366 THER/PROPH/DIAG IV INF ADDON: CPT

## 2024-09-04 PROCEDURE — G0378 HOSPITAL OBSERVATION PER HR: HCPCS

## 2024-09-04 PROCEDURE — 250N000011 HC RX IP 250 OP 636: Performed by: PHYSICIAN ASSISTANT

## 2024-09-04 PROCEDURE — 76705 ECHO EXAM OF ABDOMEN: CPT

## 2024-09-04 PROCEDURE — 96375 TX/PRO/DX INJ NEW DRUG ADDON: CPT

## 2024-09-04 PROCEDURE — 82962 GLUCOSE BLOOD TEST: CPT

## 2024-09-04 PROCEDURE — 250N000011 HC RX IP 250 OP 636: Performed by: HOSPITALIST

## 2024-09-04 PROCEDURE — 99285 EMERGENCY DEPT VISIT HI MDM: CPT | Mod: 25

## 2024-09-04 PROCEDURE — 96365 THER/PROPH/DIAG IV INF INIT: CPT

## 2024-09-04 PROCEDURE — 81001 URINALYSIS AUTO W/SCOPE: CPT | Performed by: EMERGENCY MEDICINE

## 2024-09-04 RX ORDER — AMOXICILLIN 250 MG
2 CAPSULE ORAL 2 TIMES DAILY PRN
Status: DISCONTINUED | OUTPATIENT
Start: 2024-09-04 | End: 2024-09-09 | Stop reason: HOSPADM

## 2024-09-04 RX ORDER — AMLODIPINE BESYLATE 10 MG/1
10 TABLET ORAL DAILY
Status: DISCONTINUED | OUTPATIENT
Start: 2024-09-05 | End: 2024-09-09 | Stop reason: HOSPADM

## 2024-09-04 RX ORDER — METRONIDAZOLE 500 MG/100ML
500 INJECTION, SOLUTION INTRAVENOUS EVERY 8 HOURS
Status: DISCONTINUED | OUTPATIENT
Start: 2024-09-04 | End: 2024-09-05

## 2024-09-04 RX ORDER — CEFTRIAXONE 2 G/1
2 INJECTION, POWDER, FOR SOLUTION INTRAMUSCULAR; INTRAVENOUS ONCE
Status: COMPLETED | OUTPATIENT
Start: 2024-09-04 | End: 2024-09-04

## 2024-09-04 RX ORDER — FERROUS SULFATE 325(65) MG
325 TABLET ORAL
Status: DISCONTINUED | OUTPATIENT
Start: 2024-09-05 | End: 2024-09-09 | Stop reason: HOSPADM

## 2024-09-04 RX ORDER — CALCIUM CARBONATE 500(1250)
1 TABLET ORAL 2 TIMES DAILY
Status: DISCONTINUED | OUTPATIENT
Start: 2024-09-04 | End: 2024-09-09 | Stop reason: HOSPADM

## 2024-09-04 RX ORDER — ACETAMINOPHEN 650 MG/1
650 SUPPOSITORY RECTAL EVERY 4 HOURS PRN
Status: DISCONTINUED | OUTPATIENT
Start: 2024-09-04 | End: 2024-09-09 | Stop reason: HOSPADM

## 2024-09-04 RX ORDER — HYDROMORPHONE HCL IN WATER/PF 6 MG/30 ML
0.4 PATIENT CONTROLLED ANALGESIA SYRINGE INTRAVENOUS
Status: DISCONTINUED | OUTPATIENT
Start: 2024-09-04 | End: 2024-09-09 | Stop reason: HOSPADM

## 2024-09-04 RX ORDER — HYDROCHLOROTHIAZIDE 12.5 MG/1
12.5 CAPSULE ORAL EVERY MORNING
Status: DISCONTINUED | OUTPATIENT
Start: 2024-09-05 | End: 2024-09-09 | Stop reason: HOSPADM

## 2024-09-04 RX ORDER — CEFTRIAXONE 2 G/1
2 INJECTION, POWDER, FOR SOLUTION INTRAMUSCULAR; INTRAVENOUS ONCE
Status: DISCONTINUED | OUTPATIENT
Start: 2024-09-04 | End: 2024-09-04

## 2024-09-04 RX ORDER — ONDANSETRON 4 MG/1
4 TABLET, ORALLY DISINTEGRATING ORAL EVERY 6 HOURS PRN
Status: DISCONTINUED | OUTPATIENT
Start: 2024-09-04 | End: 2024-09-09 | Stop reason: HOSPADM

## 2024-09-04 RX ORDER — AMOXICILLIN 250 MG
1 CAPSULE ORAL 2 TIMES DAILY PRN
Status: DISCONTINUED | OUTPATIENT
Start: 2024-09-04 | End: 2024-09-09 | Stop reason: HOSPADM

## 2024-09-04 RX ORDER — ONDANSETRON 2 MG/ML
4 INJECTION INTRAMUSCULAR; INTRAVENOUS EVERY 6 HOURS PRN
Status: DISCONTINUED | OUTPATIENT
Start: 2024-09-04 | End: 2024-09-09 | Stop reason: HOSPADM

## 2024-09-04 RX ORDER — DEXTROSE MONOHYDRATE 25 G/50ML
25-50 INJECTION, SOLUTION INTRAVENOUS
Status: DISCONTINUED | OUTPATIENT
Start: 2024-09-04 | End: 2024-09-09 | Stop reason: HOSPADM

## 2024-09-04 RX ORDER — CALCIUM CARBONATE 500(1250)
1 TABLET ORAL 2 TIMES DAILY
Status: DISCONTINUED | OUTPATIENT
Start: 2024-09-05 | End: 2024-09-04

## 2024-09-04 RX ORDER — OXYCODONE HYDROCHLORIDE 5 MG/1
5 TABLET ORAL EVERY 4 HOURS PRN
Status: DISCONTINUED | OUTPATIENT
Start: 2024-09-04 | End: 2024-09-09 | Stop reason: HOSPADM

## 2024-09-04 RX ORDER — LIDOCAINE 40 MG/G
CREAM TOPICAL
Status: DISCONTINUED | OUTPATIENT
Start: 2024-09-04 | End: 2024-09-09 | Stop reason: HOSPADM

## 2024-09-04 RX ORDER — NICOTINE POLACRILEX 4 MG
15-30 LOZENGE BUCCAL
Status: DISCONTINUED | OUTPATIENT
Start: 2024-09-04 | End: 2024-09-09 | Stop reason: HOSPADM

## 2024-09-04 RX ORDER — ACETAMINOPHEN 325 MG/1
650 TABLET ORAL EVERY 4 HOURS PRN
Status: DISCONTINUED | OUTPATIENT
Start: 2024-09-04 | End: 2024-09-09 | Stop reason: HOSPADM

## 2024-09-04 RX ORDER — CEFTRIAXONE 2 G/1
2 INJECTION, POWDER, FOR SOLUTION INTRAMUSCULAR; INTRAVENOUS EVERY 24 HOURS
Status: DISCONTINUED | OUTPATIENT
Start: 2024-09-05 | End: 2024-09-09 | Stop reason: HOSPADM

## 2024-09-04 RX ORDER — ATORVASTATIN CALCIUM 20 MG/1
40 TABLET, FILM COATED ORAL AT BEDTIME
Status: DISCONTINUED | OUTPATIENT
Start: 2024-09-04 | End: 2024-09-09 | Stop reason: HOSPADM

## 2024-09-04 RX ORDER — HYDROMORPHONE HCL IN WATER/PF 6 MG/30 ML
0.2 PATIENT CONTROLLED ANALGESIA SYRINGE INTRAVENOUS
Status: DISCONTINUED | OUTPATIENT
Start: 2024-09-04 | End: 2024-09-09 | Stop reason: HOSPADM

## 2024-09-04 RX ORDER — SODIUM CHLORIDE, SODIUM LACTATE, POTASSIUM CHLORIDE, CALCIUM CHLORIDE 600; 310; 30; 20 MG/100ML; MG/100ML; MG/100ML; MG/100ML
INJECTION, SOLUTION INTRAVENOUS CONTINUOUS
Status: DISCONTINUED | OUTPATIENT
Start: 2024-09-05 | End: 2024-09-05

## 2024-09-04 RX ADMIN — CALCIUM 500 MG: 500 TABLET ORAL at 23:13

## 2024-09-04 RX ADMIN — METRONIDAZOLE 500 MG: 5 INJECTION, SOLUTION INTRAVENOUS at 18:17

## 2024-09-04 RX ADMIN — ONDANSETRON 4 MG: 2 INJECTION INTRAMUSCULAR; INTRAVENOUS at 18:22

## 2024-09-04 RX ADMIN — CEFTRIAXONE 2 G: 2 INJECTION, POWDER, FOR SOLUTION INTRAMUSCULAR; INTRAVENOUS at 21:21

## 2024-09-04 RX ADMIN — SODIUM CHLORIDE, POTASSIUM CHLORIDE, SODIUM LACTATE AND CALCIUM CHLORIDE: 600; 310; 30; 20 INJECTION, SOLUTION INTRAVENOUS at 18:16

## 2024-09-04 ASSESSMENT — ACTIVITIES OF DAILY LIVING (ADL)
ADLS_ACUITY_SCORE: 20
ADLS_ACUITY_SCORE: 20
ADLS_ACUITY_SCORE: 37
ADLS_ACUITY_SCORE: 37
ADLS_ACUITY_SCORE: 20
ADLS_ACUITY_SCORE: 22
ADLS_ACUITY_SCORE: 37
ADLS_ACUITY_SCORE: 20
ADLS_ACUITY_SCORE: 37
ADLS_ACUITY_SCORE: 37

## 2024-09-04 ASSESSMENT — COLUMBIA-SUICIDE SEVERITY RATING SCALE - C-SSRS
2. HAVE YOU ACTUALLY HAD ANY THOUGHTS OF KILLING YOURSELF IN THE PAST MONTH?: NO
6. HAVE YOU EVER DONE ANYTHING, STARTED TO DO ANYTHING, OR PREPARED TO DO ANYTHING TO END YOUR LIFE?: NO
1. IN THE PAST MONTH, HAVE YOU WISHED YOU WERE DEAD OR WISHED YOU COULD GO TO SLEEP AND NOT WAKE UP?: NO

## 2024-09-04 NOTE — H&P
[facility]    History and Physical - Hospitalist Service       Date of Admission:  9/4/2024    Assessment & Plan Crispin Rojas is a 64 year old male with Pmhx of DVT on apixaban, CVA, essential hypertension, hypercholesteremia, T2DM, peripheral vascular disease, obstructive sleep apnea, SNYDER Cirrhosis, who was admitted on 9/4/2024 with acute cholecystitis.    In the ED temp 99.5F, VSS. Lab work with neutrophilic leukocytosis, CMP with borderline hyponatremia, total bilirubin of 1.6 with normal AST, ALT, alk phos.  No lactic acidosis.  Upper quadrant ultrasound with cholelithiasis, gallbladder wall thickening concerning for acute versus chronic cholecystitis.  No biliary dilation, common duct measuring 4 mm.  Given Rocephin in the ED.  Admission was requested from hospitalist service for further cares and monitoring.  General surgery was contacted from the emergency department who did not recommend cholecystectomy from the emergency department due to anticoagulation.    Sepsis 2/2 Acute Cholecystitis   Presented with 72 hours abdominal pain, nausea, vomiting. LGF.  By definition meet sepsis criteria, no acidosis and hemodynamically stable.   Known history of gallstones and SNYDER cirrhosis per patient is deemed mild. No previous abdominal surgeries or history of adverse reaction to anesthesia.   -admit obs, flip IP if prolonged stay or complicated course  -consult General Surgery   -NPO after midnight  -hold PTA Eliquis, last dose AM of 9/4   -IV abx with Rocephin, Flagyl  -analgesia, antiemetics PRN   -cld ok until midnight  -follow CMP, CBC  -start maintenance fluids around 0600     SNYDER Cirrhosis   On exam no ascites or e/o decompensated cirrhosis. US on admission noted moderate hepatic steatosis, mild rated on US from August 15th 2024.  Follows with [facility].  Patient reports that cirrhosis was previously deemed mild.  -complicates above      Type 2 DM c/b Chronic Left Diabetic Foot  "Ulcer, Polyneuropathy  Last A1c of 6.8 on PTA regimen of  Tresiba 35-55 U qAM, Metformin XR, 1000 mg BID, Novolog with a CHO ratio of 1:9, and Ozempic, 2 mg weekly.   -Monitors sugars via CGM, no lows despite Had 35 U AM of 9/4 and not eaten.   -hold metformin, ozempic   -NPO blood sugar checks q 4  -resume PTA Tresiba at reduced rate of 30 U AM of 9/5, reduce further if any low blood sugars noted overnight  -sliding scale insulin PRN     History CVA  PAD  HLD  Prior DVT: History of lacunar stroke in 2010.  He also has peripheral arterial disease and hyperlipidemia.  He is on Eliquis for previous DVT in addition to atorvastatin 40 mg daily.  -Hold Eliquis  -resume Atorvastatin at discharge     CKD stage II: Creatinine baseline is likely 1-1.1     HTN  Essential tremor: He is on lisinopril 40 mg daily, HCTZ 12.5 mg daily, amlodipine 5 mg twice daily, and propranolol 20 mg daily.  -resume propanolol, hold hydrochlorothiazide and lisinopril AM of 9/5 in case of surgery. Stagger resumption of BP meds post op          Diet: NPO for Medical/Clinical Reasons Except for: No Exceptions      DVT Prophylaxis: Pneumatic Compression Devices  Mccarthy Catheter: Not present    Cardiac Monitoring: None    Code Status:  Full Code     Clinically Significant Risk Factors Present on Admission               # Drug Induced Coagulation Defect: home medication list includes an anticoagulant medication    # Hypertension: Noted on problem list        # DMII: A1C = 6.8 % (Ref range: 0.0 - 5.6 %) within past 6 months    # Obesity: Estimated body mass index is 33.08 kg/m  as calculated from the following:    Height as of this encounter: 1.803 m (5' 11\").    Weight as of this encounter: 107.6 kg (237 lb 3.4 oz).                    Disposition Plan      Expected Discharge Date: 09/05/2024                  Barbara Ruff PA-C    ______________________________________________________________________    Chief Complaint   \"Abdominal pain\"    History " is obtained from the patient    History of Present Illness   Crispin Rojas is a 64 year old male who presented to the emergency department for abdominal pain.  The patient developed abdominal pain approximately 72 hours prior to presentation.  Since onset the pain had become constant right greater than left. Pain has worsened despite taking ibuprofen.  Has had associated low-grade temperatures, chills. Low appetite. Nausea, 1 episode of emesis on 9/2.   Reports history of known gallstones and intermittent nausea every 2 to 3 months.  He has never had pain associated with the nausea.    He has associated fatigue.  Denies history of abdominal surgeries or issues with anesthesia.  His last dose of Eliquis was the morning of presentation on 9/4.  He has not had anything toe at.       Past Medical History    Past Medical History:   Diagnosis Date    Antiplatelet or antithrombotic long-term use     Ascending aorta dilatation (H24)     Cerebral artery occlusion with cerebral infarction (H)     Cerebral infarction (H)     2010    Gastroesophageal reflux disease with esophagitis     H/O blood clots 2022    Hyperlipidemia LDL goal <70     Hypertension     Nonalcoholic steatohepatitis 11/30/2022    Numbness and tingling     Obesity     GILA (obstructive sleep apnea) 10/22/2018    CPAP intolerant    PVD (peripheral vascular disease) (H24)     s/p left partial toe amputation    Renal stone     Tremor     Type 2 diabetes mellitus with diabetic polyneuropathy, with long-term current use of insulin (H)        Past Surgical History   Past Surgical History:   Procedure Laterality Date    AMPUTATE FOOT Left 8/18/2016    Procedure: AMPUTATE FOOT;  Surgeon: Jack Younger DPM;  Location: [facility]    AMPUTATE TOE(S) Right 2/1/2016    Procedure: AMPUTATE TOE(S);  Surgeon: Rachelle Manriquez DPM, Pod;  Location: [facility]    AMPUTATE TOE(S) Right 8/2/2019    Procedure: Right fourth toe partial amputation for treatment of osteomyelitis.;   Surgeon: Jack Younger DPM;  Location: [facility]    AMPUTATE TOE(S) Left 11/8/2019    Procedure: Left second toe amputation at the metatarsophalangeal joint.;  Surgeon: Rachelle Manriquez DPM, Podiatry/Foot and Ankle Surgery;  Location: [facility]    AMPUTATE TOE(S) Right 6/5/2022    Procedure: AMPUTATION OF RIGHT GREAT TOE;  Surgeon: Wili Quiles DPM;  Location: [facility]    AMPUTATE TOE(S) Right 7/28/2022    Procedure: Right foot partial first metatarsal resection;  Surgeon: Tiny Soto DPM;  Location: [facility]    AMPUTATE TOE(S) Left 6/18/2024    Procedure: Left foot transmetatarsal amputation;  Surgeon: Tiny Soto DPM;  Location: [facility]    ANGIOGRAM      BIOPSY BONE FOOT Right 7/24/2022    Procedure: Bone biopsy, right first metatarsal.;  Surgeon: Valentino Molina DPM;  Location: [facility]    COLONOSCOPY      COMBINED CYSTOSCOPY, RETROGRADES, URETEROSCOPY, INSERT STENT Left 8/21/2016    Procedure: COMBINED CYSTOSCOPY, RETROGRADES, URETEROSCOPY, INSERT STENT;  Surgeon: Artemio Valenzuela MD;  Location: [facility]    COMBINED CYSTOSCOPY, RETROGRADES, URETEROSCOPY, LASER HOLMIUM LITHOTRIPSY URETER(S), INSERT STENT Left 10/3/2016    Procedure: COMBINED CYSTOSCOPY, RETROGRADES, URETEROSCOPY, LASER HOLMIUM LITHOTRIPSY URETER(S), INSERT STENT;  Surgeon: Artemio Valenzuela MD;  Location: [facility]    EXTRACORPOREAL SHOCK WAVE LITHOTRIPSY (ESWL) Left 9/8/2016    Procedure: EXTRACORPOREAL SHOCK WAVE LITHOTRIPSY (ESWL);  Surgeon: Artemio Valenzuela MD;  Location: [facility]    INCISION AND DRAINAGE FOOT, COMBINED Right 7/24/2022    Procedure: Incision and drainage, right foot ;  Surgeon: Valentino Molina DPM;  Location: [facility]    IRRIGATION AND DEBRIDEMENT FOOT, COMBINED Left 10/19/2023    Procedure: Left foot second metatarsal resection , . Left plantar ulcer excisional debridement, down to and including tendon, with closure, site measuring 4 cm x 2 cm x 1 cm;  Surgeon: Tiny Soto DPM;  Location: [facility]  OR    IRRIGATION AND DEBRIDEMENT HAND, COMBINED Right 7/30/2024    Procedure: Irrigation and Debridement of Right Dorsal Hand and wrist;  Surgeon: June Mcdaniels MD;  Location: [facility]    OSTEOTOMY FOOT Right 11/30/2022    Procedure: 1. Right second metatarsal floating osteotomy  2. Right second digit proximal phalanx arthroplasty 3. Right percutaneous flexor tenotomy;  Surgeon: Tiny Soto DPM;  Location: [facility]    OSTEOTOMY FOOT Right 1/19/2023    Procedure: Right foot third metatarsal head excision, ulcer debridement, matatarsal osteotomies;  Surgeon: Tiny Soto DPM;  Location: [facility] OR    RECESSION GASTROCNEMIUS Right 2/1/2016    Procedure: RECESSION GASTROCNEMIUS;  Surgeon: Rachelle Manriquez DPM, Pod;  Location: [facility]       Prior to Admission Medications   Prior to Admission Medications   Prescriptions Last Dose Informant Patient Reported? Taking?   Cholecalciferol (VITAMIN D3) 25 MCG (1000 UT) CAPS   Yes No   Sig: Take 1,000 Units by mouth daily    Co-Enzyme Q10 100 MG CAPS   Yes No   Sig: Take 100 mg by mouth daily    Continuous Glucose Sensor (FREESTYLE LUCERO 14 DAY SENSOR) [facility] [place]   Yes No   Sig: every 14 days.   ELIQUIS ANTICOAGULANT 5 MG tablet   No No   Sig: Take 1 tablet (5 mg) by mouth 2 times daily   Multiple Vitamins-Minerals (MULTIVITAMIN PO)   Yes No   Sig: Take 1 tablet by mouth daily    Semaglutide, 2 MG/DOSE, (OZEMPIC, 2 MG/DOSE,) 8 MG/3ML pen   Yes No   Sig: Inject 2 mg subcutaneously every 7 days On Wednesday   acetaminophen (TYLENOL) 325 MG tablet   No No   Sig: Take 3 tablets (975 mg) by mouth every 6 hours as needed for mild pain or other (and adjunct with moderate or severe pain or per patient request)   amLODIPine (NORVASC) 5 MG tablet   No No   Sig: Take 2 tablets (10 mg) by mouth daily   atorvastatin (LIPITOR) 40 MG tablet   Yes No   Sig: Take 40 mg by mouth at bedtime   calcium carbonate (OS-NARA) 500 MG tablet   Yes No   Sig: Take 1 tablet by mouth 2 times daily    ferrous sulfate (FEROSUL) 325 (65 Fe) MG tablet   Yes No   Sig: Take 325 mg by mouth daily (with breakfast)   gabapentin (NEURONTIN) 100 MG capsule   Yes No   Sig: Take 400 mg by mouth 3 times daily   gentamicin (GARAMYCIN) 0.1 % external ointment   Yes No   Sig: Apply topically as needed   hydrochlorothiazide (MICROZIDE) 12.5 MG capsule   No No   Sig: Take 1 capsule (12.5 mg) by mouth every morning   insulin aspart (NOVOLOG PEN) 100 UNIT/ML pen   Yes No   Sig: Inject subcutaneously 3 times daily (with meals) Inject 1 unit of insulin for every 3 grams of carbs   insulin degludec (TRESIBA) 100 UNIT/ML pen   Yes No   Sig: Inject 35-55 Units Subcutaneous every morning   ketoconazole (NIZORAL) 2 % external shampoo   Yes No   Sig: Apply topically twice a week On Tuesday and Friday   lisinopril (ZESTRIL) 40 MG tablet   No No   Sig: Take 1 tablet (40 mg) by mouth daily   magnesium gluconate (MAGONATE) 500 MG tablet   Yes No   Sig: Take 500 mg by mouth 3 times daily   metFORMIN (GLUCOPHAGE XR) 500 MG 24 hr tablet   No No   Sig: TAKE 2 TABLETS(1000 MG) BY MOUTH TWICE DAILY   omeprazole (PRILOSEC) 40 MG DR capsule   Yes No   Sig: Take 40 mg by mouth daily   propranolol (INDERAL) 20 MG tablet   Yes No   Sig: Take 20 mg by mouth 2 times daily   senna-docusate (SENOKOT-S/PERICOLACE) 8.6-50 MG tablet   No No   Sig: Take 1 tablet by mouth 2 times daily as needed for constipation   tadalafil (CIALIS) 5 MG tablet   No No   Sig: TAKE 1 TABLET BY MOUTH EVERY DAY AS NEEDED one hour before intercourse      Facility-Administered Medications: None        Review of Systems    The 10 point Review of Systems is negative other than noted in the HPI or here.     Social History   I have reviewed this patient's social history and updated it with pertinent information if needed.  Social History     Tobacco Use    Smoking status: Never    Smokeless tobacco: Never   Vaping Use    Vaping status: Never Used   Substance Use Topics    Alcohol use:  Yes     Comment: rare---red wine 3x per month    Drug use: Never        Physical Exam   Vital Signs: Temp: 99.5  F (37.5  C) Temp src: Temporal BP: (!) 142/73 Pulse: 83   Resp: 18 SpO2: 95 % O2 Device: None (Room air)    Weight: 237 lbs 3.44 oz    Constitutional: Awake, alert,  no apparent distress.  Eyes: Conjunctiva and pupils examined and normal.  HEENT: Moist mucous membranes, normal dentition.  Respiratory: Clear to auscultation bilaterally, no crackles or wheezing.  Cardiovascular: Regular rate and rhythm, normal S1 and S2, and no murmur noted.  GI: Soft, non-distended, RUQ tenderness, bowel sounds hypoactive. No rebound tenderness or guarding.  Skin: No rashes, no cyanosis, no edema.  Musculoskeletal: No deformities noted.  No erythema or tenderness. Moving all extremities.  Neurologic: No focal deficits noted. Speech is clear. Coordination and strength grossly normal.   Psychiatric: Appropriate affect.       Medical Decision Making       75 MINUTES SPENT BY ME on the date of service doing chart review, history, exam, documentation & further activities per the note.      Data   ------------------------- PAST 24 HR DATA REVIEWED -----------------------------------------------

## 2024-09-04 NOTE — PLAN OF CARE
Up to floor about 1740.  Reports 5/10 abd pain, declining prn pain medications at this time.  BP elevated.  C/o nausea; zofran given.   requiring 1 unit per sliding scale but patient declining one unit.  IV p/I.  Brace covering right hand to cover previous dog bite.  Clears until midnight then NPO. Cont with plan of care.

## 2024-09-04 NOTE — TELEPHONE ENCOUNTER
ADS provider recommend patient to go the ER due the range of symptoms.  Patient agreed and will leave in about 20 minutes.

## 2024-09-04 NOTE — ED PROVIDER NOTES
Emergency Department Note      History of Present Illness     Chief Complaint   Abdominal Pain      HPI   Crispin Rojas is a 64 year old male who presents with generalized abdominal discomfort.  Patient reports that he developed abdominal discomfort on Monday.  Started in his epigastrium and then generalized.  Had 2 episodes of vomiting Monday morning not bloody.  Has had 1 bowel movement.  Reports having had low-grade fevers, nauseous, generalized achiness.  Denies any chest pain, shortness of breath, cough, runny nose, sore throat.  Has never had pain like this before.  History of antacid and acid reflux but this is new.  No history of prior surgical procedures on the abdomen.  Last Eliquis dose this morning.    Independent Historian   None    Review of External Notes   Previous primary care note from 7/5/2024 regarding patient's known medical history including DVT on Eliquis, diabetes, hyperlipidemia,    Past Medical History     Medical History and Problem List   Past Medical History:   Diagnosis Date    Antiplatelet or antithrombotic long-term use     Ascending aorta dilatation (H24)     Cerebral artery occlusion with cerebral infarction (H)     Cerebral infarction (H)     Gastroesophageal reflux disease with esophagitis     H/O blood clots 2022    Hyperlipidemia LDL goal <70     Hypertension     Nonalcoholic steatohepatitis 11/30/2022    Numbness and tingling     Obesity     GILA (obstructive sleep apnea) 10/22/2018    PVD (peripheral vascular disease) (H24)     Renal stone     Tremor     Type 2 diabetes mellitus with diabetic polyneuropathy, with long-term current use of insulin (H)        Medications   No current outpatient medications on file.      Surgical History   Past Surgical History:   Procedure Laterality Date    AMPUTATE FOOT Left 8/18/2016    Procedure: AMPUTATE FOOT;  Surgeon: Jack Younger DPM;  Location: [facility]    AMPUTATE TOE(S) Right 2/1/2016    Procedure: AMPUTATE TOE(S);  Surgeon:  Rachelle Manriquez DPM, Pod;  Location: [facility]    AMPUTATE TOE(S) Right 8/2/2019    Procedure: Right fourth toe partial amputation for treatment of osteomyelitis.;  Surgeon: Jack Younger DPM;  Location: [facility]    AMPUTATE TOE(S) Left 11/8/2019    Procedure: Left second toe amputation at the metatarsophalangeal joint.;  Surgeon: Rachelle Manriquez DPM, Podiatry/Foot and Ankle Surgery;  Location: [facility]    AMPUTATE TOE(S) Right 6/5/2022    Procedure: AMPUTATION OF RIGHT GREAT TOE;  Surgeon: Wili Quiles DPM;  Location: [facility]    AMPUTATE TOE(S) Right 7/28/2022    Procedure: Right foot partial first metatarsal resection;  Surgeon: Tiny Soto DPM;  [facility]: [facility]    AMPUTATE TOE(S) Left 6/18/2024    Procedure: Left foot transmetatarsal amputation;  Surgeon: Tiny Soto DPM;  Location: [facility]    ANGIOGRAM      BIOPSY BONE FOOT Right 7/24/2022    Procedure: Bone biopsy, right first metatarsal.;  Surgeon: Valentino Molina DPM;  Location: [facility]    COLONOSCOPY      COMBINED CYSTOSCOPY, RETROGRADES, URETEROSCOPY, INSERT STENT Left 8/21/2016    Procedure: COMBINED CYSTOSCOPY, RETROGRADES, URETEROSCOPY, INSERT STENT;  Surgeon: Artemio Valenzuela MD;  Location: [facility]    COMBINED CYSTOSCOPY, RETROGRADES, URETEROSCOPY, LASER HOLMIUM LITHOTRIPSY URETER(S), INSERT STENT Left 10/3/2016    Procedure: COMBINED CYSTOSCOPY, RETROGRADES, URETEROSCOPY, LASER HOLMIUM LITHOTRIPSY URETER(S), INSERT STENT;  Surgeon: Artemio Valenzuela MD;  [facility]: [facility]    EXTRACORPOREAL SHOCK WAVE LITHOTRIPSY (ESWL) Left 9/8/2016    Procedure: EXTRACORPOREAL SHOCK WAVE LITHOTRIPSY (ESWL);  Surgeon: Artemio Valenzuela MD;  Location: [facility]    INCISION AND DRAINAGE FOOT, COMBINED Right 7/24/2022    Procedure: Incision and drainage, right foot ;  Surgeon: Valentino Molina DPM;  [facility]: [facility]    IRRIGATION AND DEBRIDEMENT FOOT, COMBINED Left 10/19/2023    Procedure: Left foot second metatarsal resection , . Left  "plantar ulcer excisional debridement, down to and including tendon, with closure, site measuring 4 cm x 2 cm x 1 cm;  Surgeon: Tiny Soto DPM;  [facility]: [facility]    IRRIGATION AND DEBRIDEMENT HAND, COMBINED Right 7/30/2024    Procedure: Irrigation and Debridement of Right Dorsal Hand and wrist;  Surgeon: June Mcdaniels MD;  Location: [facility]    OSTEOTOMY FOOT Right 11/30/2022    Procedure: 1. Right second metatarsal floating osteotomy  2. Right second digit proximal phalanx arthroplasty 3. Right percutaneous flexor tenotomy;  Surgeon: Tiny Soto DPM;  [facility]    OSTEOTOMY FOOT Right 1/19/2023    Procedure: Right foot third metatarsal head excision, ulcer debridement, matatarsal osteotomies;  Surgeon: Tiny Soto DPM;  Location: [facility]    RECESSION GASTROCNEMIUS Right 2/1/2016    Procedure: RECESSION GASTROCNEMIUS;  Surgeon: Rachelle Manriquez DPM, Pod;  Location: [facility]       Physical Exam     Patient Vitals for the past 24 hrs:   BP Temp Temp src Pulse Resp SpO2 Height Weight   09/04/24 1745 (!) 164/74 98.5  F (36.9  C) -- 83 18 96 % -- --   09/04/24 1724 (!) 147/77 98.1  F (36.7  C) Oral 80 23 91 % -- --   09/04/24 1700 (!) 152/74 -- -- 80 30 92 % -- --   09/04/24 1645 -- -- -- 81 12 96 % -- --   09/04/24 1630 (!) 144/78 -- -- 81 12 93 % -- --   09/04/24 1615 -- -- -- 80 23 96 % -- --   09/04/24 1600 (!) 160/80 -- -- 81 23 95 % -- --   09/04/24 1545 (!) 152/79 -- -- -- -- 96 % -- --   09/04/24 1219 (!) 142/73 99.5  F (37.5  C) Temporal 83 18 95 % 1.803 m (5' 11\") 107.6 kg (237 lb 3.4 oz)     Physical Exam  General: Resting on the bed.  Head: No obvious trauma to head.  Ears, Nose, Throat:  External ears normal.  Nose normal.  Eyes:  Conjunctivae clear.  Pupils are equal, round, and reactive.   Neck: Normal range of motion.  Neck supple.   CV: Regular rate and rhythm.  No murmurs.      Respiratory: Effort normal and breath sounds normal.  No wheezing or crackles. "   Gastrointestinal: Soft.  No distension. There is epigastric and RUQ tenderness.  There is no rigidity, no rebound and no guarding.   Musculoskeletal: No CVA tenderness  Neuro: Alert. Moving all extremities appropriately.  Normal speech.    Skin: Skin is warm and dry.  No rash noted.     Diagnostics     Lab Results   Labs Ordered and Resulted from Time of ED Arrival to Time of ED Departure   COMPREHENSIVE METABOLIC PANEL - Abnormal       Result Value    Sodium 134 (*)     Potassium 3.7      Carbon Dioxide (CO2) 22      Anion Gap 13      Urea Nitrogen 16.1      Creatinine 1.08      GFR Estimate 78      Calcium 8.7 (*)     Chloride 99      Glucose 164 (*)     Alkaline Phosphatase 61      AST 22      ALT 22      Protein Total 7.7      Albumin 3.9      Bilirubin Total 1.6 (*)    CBC WITH PLATELETS AND DIFFERENTIAL - Abnormal    WBC Count 15.5 (*)     RBC Count 4.48      Hemoglobin 12.8 (*)     Hematocrit 38.9 (*)     MCV 87      MCH 28.6      MCHC 32.9      RDW 14.2      Platelet Count 192      % Neutrophils 83      % Lymphocytes 9      % Monocytes 7      % Eosinophils 0      % Basophils 0      % Immature Granulocytes 1      NRBCs per 100 WBC 0      Absolute Neutrophils 12.9 (*)     Absolute Lymphocytes 1.4      Absolute Monocytes 1.1      Absolute Eosinophils 0.0      Absolute Basophils 0.0      Absolute Immature Granulocytes 0.1      Absolute NRBCs 0.0     ROUTINE UA WITH MICROSCOPIC REFLEX TO CULTURE - Abnormal    Color Urine Yellow      Appearance Urine Clear      Glucose Urine 30 (*)     Bilirubin Urine Negative      Ketones Urine Negative      Specific Gravity Urine 1.027      Blood Urine Negative      pH Urine 6.0      Protein Albumin Urine 70 (*)     Urobilinogen Urine Normal      Nitrite Urine Negative      Leukocyte Esterase Urine Negative      Mucus Urine Present (*)     RBC Urine 1      WBC Urine <1      Squamous Epithelials Urine <1     LACTIC ACID WHOLE BLOOD WITH 1X REPEAT IN 2 HR WHEN >2 - Normal     Lactic Acid, Initial 1.5     LIPASE - Normal    Lipase 28         Imaging   US Abdomen Limited (RUQ)   Final Result   IMPRESSION:   1.  Cholelithiasis with gallbladder wall thickening up to 8 mm in the   somewhat distended gallbladder, differential includes acute and   chronic cystitis.   2.  At least moderate hepatic steatosis.      JOHN TATE MD            SYSTEM ID:  [id number]            Independent Interpretation   None    ED Course      Medications Administered   Medications   cefTRIAXone (ROCEPHIN) 2 g vial to attach to  ml bag for ADULTS or NS 50 ml bag for PEDS (has no administration in time range)   metroNIDAZOLE (FLAGYL) infusion 500 mg (500 mg Intravenous $New Bag 9/4/24 1817)   senna-docusate (SENOKOT-S/PERICOLACE) 8.6-50 MG per tablet 1 tablet (has no administration in time range)     Or   senna-docusate (SENOKOT-S/PERICOLACE) 8.6-50 MG per tablet 2 tablet (has no administration in time range)   ondansetron (ZOFRAN ODT) ODT tab 4 mg ( Oral See Alternative 9/4/24 1822)     Or   ondansetron (ZOFRAN) injection 4 mg (4 mg Intravenous $Given 9/4/24 1822)   lidocaine 1 % 0.1-1 mL (has no administration in time range)   lidocaine (LMX4) cream (has no administration in time range)   sodium chloride (PF) 0.9% PF flush 3 mL (has no administration in time range)   sodium chloride (PF) 0.9% PF flush 3 mL (has no administration in time range)   lactated ringers infusion ( Intravenous $New Bag 9/4/24 1816)   acetaminophen (TYLENOL) tablet 650 mg (has no administration in time range)     Or   acetaminophen (TYLENOL) Suppository 650 mg (has no administration in time range)   oxyCODONE IR (ROXICODONE) half-tab 2.5 mg (has no administration in time range)   oxyCODONE (ROXICODONE) tablet 5 mg (has no administration in time range)   HYDROmorphone (DILAUDID) injection 0.2 mg (has no administration in time range)   HYDROmorphone (DILAUDID) injection 0.4 mg (has no administration in time range)   melatonin tablet 5  mg (has no administration in time range)   glucose gel 15-30 g (has no administration in time range)     Or   dextrose 50 % injection 25-50 mL (has no administration in time range)     Or   glucagon injection 1 mg (has no administration in time range)   insulin aspart (NovoLOG) injection (RAPID ACTING) ( Subcutaneous Not Given 9/4/24 2130)   insulin degludec (TRESIBA) 100 UNIT/ML injection 30 Units (has no administration in time range)   atorvastatin (LIPITOR) tablet 40 mg ( Oral Automatically Held 9/7/24 2200)   apixaban ANTICOAGULANT (ELIQUIS) tablet 5 mg ( Oral Automatically Held 9/7/24 2000)   ferrous sulfate (FEROSUL) tablet 325 mg (has no administration in time range)   gabapentin (NEURONTIN) capsule 400 mg (has no administration in time range)   hydrochlorothiazide (MICROZIDE) capsule 12.5 mg ( Oral Automatically Held 9/8/24 0800)   amLODIPine (NORVASC) tablet 10 mg (has no administration in time range)   calcium carbonate 500 mg (elemental) (OSCAL) tablet 500 mg (has no administration in time range)   cefTRIAXone (ROCEPHIN) 2 g vial to attach to  ml bag for ADULTS or NS 50 ml bag for PEDS (2 g Intravenous $New Bag 9/4/24 2121)       Procedures   Procedures     Discussion of Management   General surgery     ED Course   ED Course as of 09/04/24 2256   Wed Sep 04, 2024   1548 I reassessed the patient, continues to have RUQ pain.  Discussed results and plan.  Last meal yesterday.    1554 I spoke with Dr Barriga from general surgery, admit to hospitalist, will have to wait 24-48 hours post eliquis.     1606 I spoke with Barbara Estrella for admission        Additional Documentation  None    Medical Decision Making / Diagnosis     CMS Diagnoses: None    MIPS       None    MDM   Crispin Rojas is a 64 year old male who presents emergency department for abdominal discomfort.  Vital signs stable.  Broad differential was considered including but not limited to pancreatitis, cholecystitis, cholangitis,  obstruction, perforation, etc.  CBC shows mild leukocytosis but ongoing anemia.  BMP without acute electrolyte, metabolic or renal dysfunction.  LFTs with mild bilirubin elevation, and no other obstructive pathology, lipase is normal.  UA is unrevealing.  Ultrasound shows evidence of acute cholecystitis.  Ceftriaxone given.  Spoke with general surgery.  Cannot do emergent surgery given the patient took Eliquis this morning.  Plan admit to the hospitalist for ongoing management.    Disposition   The patient was admitted to the hospital.     Diagnosis     ICD-10-CM    1. Cholecystitis  K81.9       2. RUQ abdominal pain  R10.11            Discharge Medications   Current Discharge Medication List            MD Nicolas Banegas Jennifer L, MD  09/04/24 6640

## 2024-09-04 NOTE — ED TRIAGE NOTES
Pt presents to the ED stating he has had generalized abd pain since Monday morning. Pt states the pain started at a 9/10 in epigastrium, and the the pain spread to all over abdomen and now 4/10. Pt states he has intermittent nausea and hasn't been eating well. Pt states he is also tired.

## 2024-09-04 NOTE — ED NOTES
[facility]  ED Nurse Handoff Report    ED Chief complaint: Abdominal Pain  . ED Diagnosis:   Final diagnoses:   Cholecystitis   RUQ abdominal pain       Allergies:   Allergies   Allergen Reactions    Bactrim [Sulfamethoxazole-Trimethoprim] Nausea and Vomiting    Statins Swelling     Other reaction(s): Other (see comments)  SIMCOR caused edema in extremities  Only Simvastatin    Sulfatolamide Nausea and Vomiting    Niacin Swelling     SIMCOR caused edema in extremities    Simvastatin Swelling     SIMCOR caused edema in extremities    Sulfa Antibiotics Nausea       Code Status: Full Code    Activity level - Baseline/Home:  independent.  Activity Level - Current:   in bed.   Lift room needed: No.   Bariatric: No   Needed: No   Isolation: No.   Infection: Not Applicable.     Respiratory status: Room air    Vital Signs (within 30 minutes):   Vitals:    09/04/24 1615 09/04/24 1630 09/04/24 1645 09/04/24 1700   BP:  (!) 144/78  (!) 152/74   Pulse: 80 81 81 80   Resp: 23 12 12 30   Temp:       TempSrc:       SpO2: 96% 93% 96% 92%   Weight:       Height:           Cardiac Rhythm:  ,      Pain level:    Patient confused: No.   Patient Falls Risk: nonskid shoes/slippers when out of bed.   Elimination Status: Has voided     Patient Report - Initial Complaint: abdominal pain since Monday.   Focused Assessment: abdominal pain, nausea and no BM since Monday. Not eating or drinking well.      Abnormal Results:   Labs Ordered and Resulted from Time of ED Arrival to Time of ED Departure   COMPREHENSIVE METABOLIC PANEL - Abnormal       Result Value    Sodium 134 (*)     Potassium 3.7      Carbon Dioxide (CO2) 22      Anion Gap 13      Urea Nitrogen 16.1      Creatinine 1.08      GFR Estimate 78      Calcium 8.7 (*)     Chloride 99      Glucose 164 (*)     Alkaline Phosphatase 61      AST 22      ALT 22      Protein Total 7.7      Albumin 3.9      Bilirubin Total 1.6 (*)    CBC WITH PLATELETS AND  DIFFERENTIAL - Abnormal    WBC Count 15.5 (*)     RBC Count 4.48      Hemoglobin 12.8 (*)     Hematocrit 38.9 (*)     MCV 87      MCH 28.6      MCHC 32.9      RDW 14.2      Platelet Count 192      % Neutrophils 83      % Lymphocytes 9      % Monocytes 7      % Eosinophils 0      % Basophils 0      % Immature Granulocytes 1      NRBCs per 100 WBC 0      Absolute Neutrophils 12.9 (*)     Absolute Lymphocytes 1.4      Absolute Monocytes 1.1      Absolute Eosinophils 0.0      Absolute Basophils 0.0      Absolute Immature Granulocytes 0.1      Absolute NRBCs 0.0     ROUTINE UA WITH MICROSCOPIC REFLEX TO CULTURE - Abnormal    Color Urine Yellow      Appearance Urine Clear      Glucose Urine 30 (*)     Bilirubin Urine Negative      Ketones Urine Negative      Specific Gravity Urine 1.027      Blood Urine Negative      pH Urine 6.0      Protein Albumin Urine 70 (*)     Urobilinogen Urine Normal      Nitrite Urine Negative      Leukocyte Esterase Urine Negative      Mucus Urine Present (*)     RBC Urine 1      WBC Urine <1      Squamous Epithelials Urine <1     LACTIC ACID WHOLE BLOOD WITH 1X REPEAT IN 2 HR WHEN >2 - Normal    Lactic Acid, Initial 1.5     LIPASE - Normal    Lipase 28          US Abdomen Limited (RUQ)   Final Result   IMPRESSION:   1.  Cholelithiasis with gallbladder wall thickening up to 8 mm in the   somewhat distended gallbladder, differential includes acute and   chronic cystitis.   2.  At least moderate hepatic steatosis.      JOHN TATE MD            SYSTEM ID:  [id number]          Treatments provided:   Family Comments: no family at bedside in ER   OBS brochure/video discussed/provided to patient:  No  ED Medications:   Medications   cefTRIAXone (ROCEPHIN) 2 g vial to attach to  ml bag for ADULTS or NS 50 ml bag for PEDS (has no administration in time range)   cefTRIAXone (ROCEPHIN) 2 g vial to attach to  ml bag for ADULTS or NS 50 ml bag for PEDS (has no administration in time range)    metroNIDAZOLE (FLAGYL) infusion 500 mg (has no administration in time range)   senna-docusate (SENOKOT-S/PERICOLACE) 8.6-50 MG per tablet 1 tablet (has no administration in time range)     Or   senna-docusate (SENOKOT-S/PERICOLACE) 8.6-50 MG per tablet 2 tablet (has no administration in time range)   ondansetron (ZOFRAN ODT) ODT tab 4 mg (has no administration in time range)     Or   ondansetron (ZOFRAN) injection 4 mg (has no administration in time range)   lidocaine 1 % 0.1-1 mL (has no administration in time range)   lidocaine (LMX4) cream (has no administration in time range)   sodium chloride (PF) 0.9% PF flush 3 mL (has no administration in time range)   sodium chloride (PF) 0.9% PF flush 3 mL (has no administration in time range)   lactated ringers infusion (has no administration in time range)   acetaminophen (TYLENOL) tablet 650 mg (has no administration in time range)     Or   acetaminophen (TYLENOL) Suppository 650 mg (has no administration in time range)   oxyCODONE IR (ROXICODONE) half-tab 2.5 mg (has no administration in time range)   oxyCODONE (ROXICODONE) tablet 5 mg (has no administration in time range)   HYDROmorphone (DILAUDID) injection 0.2 mg (has no administration in time range)   HYDROmorphone (DILAUDID) injection 0.4 mg (has no administration in time range)   melatonin tablet 5 mg (has no administration in time range)       Drips infusing:  No  For the majority of the shift this patient was Green.   Interventions performed were .    Sepsis treatment initiated: No    Cares/treatment/interventions/medications to be completed following ED care: a/o x 4, able to communicate needs. Will need IV ABX and IV maint. Fluids started.     ED Nurse Name: Krissy Vidales RN  5:10 PM  RECEIVING UNIT ED HANDOFF REVIEW    Above ED Nurse Handoff Report was reviewed: Yes  Reviewed by: Alethea Cobian RN on September 4, 2024 at 5:20 PM   I Ryan called the ED to inform them the note was read: Yes

## 2024-09-04 NOTE — TELEPHONE ENCOUNTER
"Second level triage-Yes    S-(situation): abdominal pain    B-(background): history of kidney stones x 2.  Feels the same as when he has had them in the past.    A-(assessment): Pain-suddenly woke up to abdominal pain, initially in middle of his upper abdomen and radiated out to the rest of the abdomen from chest to waist.  Not noting pain radiating anywhere since started-just \"all over abdomen\".  States abdomen still \"extremely sensitive to touch.  Denied pain radiating to groin. Tylenol helped pain and fever some, but fever is back this am.   Last bm was Monday-normal stool.    Vomiting- 2 times on Monday.  No further vomiting.  has not eaten much since Monday.  Forced eating on Tuesday, but nauseated. Denied blood in vomit.     Urinary-Voided last today at 0200-\"forced the urine out\".  Urine dark yellow in color. Stated only voided 2 times since Monday.  Not feeling urge to void or bladder fullness.  Is able to drink-\"drinking lots of ice tea\".      Fever-Having fevers intermittently since Monday-doesn't have a thermometer-having chills.      R-(recommendations): ER or ADS per protocol        Reason for Disposition   MILD TO MODERATE constant pain lasting > 2 hours    Additional Information   Negative: Passed out (i.e., fainted, collapsed and was not responding)   Negative: Shock suspected (e.g., cold/pale/clammy skin, too weak to stand, low BP, rapid pulse)   Negative: Sounds like a life-threatening emergency to the triager   Negative: Followed an abdomen (stomach) injury   Negative: Chest pain   Negative: Pain is mainly in upper abdomen (if needed ask: 'is it mainly above the belly button?')   Negative: Abdomen bloating or swelling are main symptoms   Negative: SEVERE abdominal pain (e.g., excruciating)   Negative: Vomiting red blood or black (coffee ground) material   Negative: Blood in bowel movements  (Exception: Blood on surface of BM with constipation.)   Negative: Black or tarry bowel movements  " "(Exception: Chronic-unchanged black-grey BMs AND is taking iron pills or Pepto-Bismol.)   Negative: Unable to urinate (or only a few drops) and bladder feels very full   Negative: Pain in scrotum persists > 1 hour    Answer Assessment - Initial Assessment Questions  1. LOCATION: \"Where does it hurt?\"       All over abdomen.  Most painful spot was just below the chest in middle.  Was radiating out from that area initially, now is just all over  2. RADIATION: \"Does the pain shoot anywhere else?\" (e.g., chest, back)      No, not anymore.  3. ONSET: \"When did the pain begin?\" (Minutes, hours or days ago)       Monday am  4. SUDDEN: \"Gradual or sudden onset?\"      Sudden-woke up with pain  5. PATTERN \"Does the pain come and go, or is it constant?\"     - If it comes and goes: \"How long does it last?\" \"Do you have pain now?\"      (Note: Comes and goes means the pain is intermittent. It goes away completely between bouts.)     - If constant: \"Is it getting better, staying the same, or getting worse?\"       (Note: Constant means the pain never goes away completely; most serious pain is constant and gets worse.)       Intermittent over days, but when pain starts, its constant  6. SEVERITY: \"How bad is the pain?\"  (e.g., Scale 1-10; mild, moderate, or severe)     - MILD (1-3): Doesn't interfere with normal activities, abdomen soft and not tender to touch.      - MODERATE (4-7): Interferes with normal activities or awakens from sleep, abdomen tender to touch.      - SEVERE (8-10): Excruciating pain, doubled over, unable to do any normal activities.        9/10 initially, currently 4/10  7. RECURRENT SYMPTOM: \"Have you ever had this type of stomach pain before?\" If Yes, ask: \"When was the last time?\" and \"What happened that time?\"       Yes, he had a kidney stone  8. CAUSE: \"What do you think is causing the stomach pain?\"      Kidney stone  9. RELIEVING/AGGRAVATING FACTORS: \"What makes it better or worse?\" (e.g., antacids, " "bending or twisting motion, bowel movement)      tylenol  10. OTHER SYMPTOMS: \"Do you have any other symptoms?\" (e.g., back pain, diarrhea, fever, urination pain, vomiting)        Fever, vomiting    Protocols used: Abdominal Pain - Male-A-[place]    "

## 2024-09-05 ENCOUNTER — HOSPITAL ENCOUNTER (OUTPATIENT)
Facility: CLINIC | Age: 64
End: 2024-09-05
Attending: SURGERY | Admitting: SURGERY
Payer: COMMERCIAL

## 2024-09-05 LAB
ALBUMIN SERPL BCG-MCNC: 3.2 G/DL (ref 3.5–5.2)
ALP SERPL-CCNC: 53 U/L (ref 40–150)
ALT SERPL W P-5'-P-CCNC: 15 U/L (ref 0–70)
ANION GAP SERPL CALCULATED.3IONS-SCNC: 15 MMOL/L (ref 7–15)
AST SERPL W P-5'-P-CCNC: 20 U/L (ref 0–45)
BILIRUB SERPL-MCNC: 1.3 MG/DL
BUN SERPL-MCNC: 13.9 MG/DL (ref 8–23)
CALCIUM SERPL-MCNC: 8 MG/DL (ref 8.8–10.4)
CHLORIDE SERPL-SCNC: 99 MMOL/L (ref 98–107)
CREAT SERPL-MCNC: 1.11 MG/DL (ref 0.67–1.17)
EGFRCR SERPLBLD CKD-EPI 2021: 75 ML/MIN/1.73M2
ERYTHROCYTE [DISTWIDTH] IN BLOOD BY AUTOMATED COUNT: 14.2 % (ref 10–15)
GLUCOSE BLDC GLUCOMTR-MCNC: 152 MG/DL (ref 70–99)
GLUCOSE BLDC GLUCOMTR-MCNC: 155 MG/DL (ref 70–99)
GLUCOSE BLDC GLUCOMTR-MCNC: 163 MG/DL (ref 70–99)
GLUCOSE BLDC GLUCOMTR-MCNC: 166 MG/DL (ref 70–99)
GLUCOSE BLDC GLUCOMTR-MCNC: 232 MG/DL (ref 70–99)
GLUCOSE SERPL-MCNC: 164 MG/DL (ref 70–99)
HCO3 SERPL-SCNC: 20 MMOL/L (ref 22–29)
HCT VFR BLD AUTO: 35 % (ref 40–53)
HGB BLD-MCNC: 11.3 G/DL (ref 13.3–17.7)
MCH RBC QN AUTO: 28.4 PG (ref 26.5–33)
MCHC RBC AUTO-ENTMCNC: 32.3 G/DL (ref 31.5–36.5)
MCV RBC AUTO: 88 FL (ref 78–100)
PLATELET # BLD AUTO: 156 10E3/UL (ref 150–450)
POTASSIUM SERPL-SCNC: 3.8 MMOL/L (ref 3.4–5.3)
PROT SERPL-MCNC: 6.7 G/DL (ref 6.4–8.3)
RBC # BLD AUTO: 3.98 10E6/UL (ref 4.4–5.9)
SODIUM SERPL-SCNC: 134 MMOL/L (ref 135–145)
WBC # BLD AUTO: 11.1 10E3/UL (ref 4–11)

## 2024-09-05 PROCEDURE — 96376 TX/PRO/DX INJ SAME DRUG ADON: CPT

## 2024-09-05 PROCEDURE — 96375 TX/PRO/DX INJ NEW DRUG ADDON: CPT

## 2024-09-05 PROCEDURE — 85048 AUTOMATED LEUKOCYTE COUNT: CPT | Performed by: PHYSICIAN ASSISTANT

## 2024-09-05 PROCEDURE — 82962 GLUCOSE BLOOD TEST: CPT

## 2024-09-05 PROCEDURE — 258N000003 HC RX IP 258 OP 636: Performed by: PHYSICIAN ASSISTANT

## 2024-09-05 PROCEDURE — 250N000011 HC RX IP 250 OP 636: Performed by: PHYSICIAN ASSISTANT

## 2024-09-05 PROCEDURE — 99204 OFFICE O/P NEW MOD 45 MIN: CPT | Mod: 57 | Performed by: SURGERY

## 2024-09-05 PROCEDURE — G0378 HOSPITAL OBSERVATION PER HR: HCPCS

## 2024-09-05 PROCEDURE — 99232 SBSQ HOSP IP/OBS MODERATE 35: CPT | Performed by: INTERNAL MEDICINE

## 2024-09-05 PROCEDURE — 250N000013 HC RX MED GY IP 250 OP 250 PS 637: Performed by: INTERNAL MEDICINE

## 2024-09-05 PROCEDURE — 258N000003 HC RX IP 258 OP 636: Performed by: STUDENT IN AN ORGANIZED HEALTH CARE EDUCATION/TRAINING PROGRAM

## 2024-09-05 PROCEDURE — 36415 COLL VENOUS BLD VENIPUNCTURE: CPT | Performed by: PHYSICIAN ASSISTANT

## 2024-09-05 PROCEDURE — 82040 ASSAY OF SERUM ALBUMIN: CPT | Performed by: PHYSICIAN ASSISTANT

## 2024-09-05 PROCEDURE — 250N000013 HC RX MED GY IP 250 OP 250 PS 637: Performed by: PHYSICIAN ASSISTANT

## 2024-09-05 RX ORDER — NICOTINE POLACRILEX 4 MG
15-30 LOZENGE BUCCAL
Status: DISCONTINUED | OUTPATIENT
Start: 2024-09-05 | End: 2024-09-05

## 2024-09-05 RX ORDER — SODIUM CHLORIDE, SODIUM LACTATE, POTASSIUM CHLORIDE, CALCIUM CHLORIDE 600; 310; 30; 20 MG/100ML; MG/100ML; MG/100ML; MG/100ML
INJECTION, SOLUTION INTRAVENOUS CONTINUOUS
Status: ACTIVE | OUTPATIENT
Start: 2024-09-05 | End: 2024-09-06

## 2024-09-05 RX ORDER — PROPRANOLOL HYDROCHLORIDE 20 MG/1
20 TABLET ORAL 2 TIMES DAILY
Status: DISCONTINUED | OUTPATIENT
Start: 2024-09-05 | End: 2024-09-09 | Stop reason: HOSPADM

## 2024-09-05 RX ORDER — LISINOPRIL 40 MG/1
40 TABLET ORAL DAILY
Status: DISCONTINUED | OUTPATIENT
Start: 2024-09-05 | End: 2024-09-07

## 2024-09-05 RX ORDER — DEXTROSE MONOHYDRATE 25 G/50ML
25-50 INJECTION, SOLUTION INTRAVENOUS
Status: DISCONTINUED | OUTPATIENT
Start: 2024-09-05 | End: 2024-09-05

## 2024-09-05 RX ORDER — PANTOPRAZOLE SODIUM 40 MG/1
40 TABLET, DELAYED RELEASE ORAL
Status: DISCONTINUED | OUTPATIENT
Start: 2024-09-05 | End: 2024-09-09 | Stop reason: HOSPADM

## 2024-09-05 RX ADMIN — SODIUM CHLORIDE, POTASSIUM CHLORIDE, SODIUM LACTATE AND CALCIUM CHLORIDE: 600; 310; 30; 20 INJECTION, SOLUTION INTRAVENOUS at 08:32

## 2024-09-05 RX ADMIN — GABAPENTIN 400 MG: 300 CAPSULE ORAL at 14:31

## 2024-09-05 RX ADMIN — SODIUM CHLORIDE, POTASSIUM CHLORIDE, SODIUM LACTATE AND CALCIUM CHLORIDE: 600; 310; 30; 20 INJECTION, SOLUTION INTRAVENOUS at 21:58

## 2024-09-05 RX ADMIN — LISINOPRIL 40 MG: 40 TABLET ORAL at 17:32

## 2024-09-05 RX ADMIN — ACETAMINOPHEN 325MG 650 MG: 325 TABLET ORAL at 22:11

## 2024-09-05 RX ADMIN — CALCIUM 500 MG: 500 TABLET ORAL at 08:45

## 2024-09-05 RX ADMIN — FERROUS SULFATE TAB 325 MG (65 MG ELEMENTAL FE) 325 MG: 325 (65 FE) TAB at 08:45

## 2024-09-05 RX ADMIN — CALCIUM 500 MG: 500 TABLET ORAL at 20:55

## 2024-09-05 RX ADMIN — METRONIDAZOLE 500 MG: 5 INJECTION, SOLUTION INTRAVENOUS at 10:38

## 2024-09-05 RX ADMIN — METRONIDAZOLE 500 MG: 5 INJECTION, SOLUTION INTRAVENOUS at 02:00

## 2024-09-05 RX ADMIN — PROPRANOLOL HYDROCHLORIDE 20 MG: 20 TABLET ORAL at 20:55

## 2024-09-05 RX ADMIN — PANTOPRAZOLE SODIUM 40 MG: 40 TABLET, DELAYED RELEASE ORAL at 17:32

## 2024-09-05 RX ADMIN — GABAPENTIN 400 MG: 300 CAPSULE ORAL at 08:44

## 2024-09-05 RX ADMIN — GABAPENTIN 400 MG: 300 CAPSULE ORAL at 20:55

## 2024-09-05 RX ADMIN — CEFTRIAXONE 2 G: 2 INJECTION, POWDER, FOR SOLUTION INTRAMUSCULAR; INTRAVENOUS at 21:00

## 2024-09-05 ASSESSMENT — ACTIVITIES OF DAILY LIVING (ADL)
ADLS_ACUITY_SCORE: 22
DEPENDENT_IADLS:: INDEPENDENT
ADLS_ACUITY_SCORE: 22

## 2024-09-05 NOTE — PLAN OF CARE
"Goal Outcome Evaluation:      Plan of Care Reviewed With: patient      Outcome Evaluation: Patient is alert and oriented. VSS, afebrile. Infusing kaylene fluids. Refusing insulin. Now on a low fat diet since surgery is rescheduled.  Patient on Flagyl and rocephin.Patient is indepdent in the room. LS clear. Patient  has adequate urine output but urine appears to be Dark yellow. Encouraged patient to hydrate and push fluids. Will kaylene to provider interventions as needed. No concerns at this time.      Problem: Adult Inpatient Plan of Care  Goal: Plan of Care Review  Description: The Plan of Care Review/Shift note should be completed every shift.  The Outcome Evaluation is a brief statement about your assessment that the patient is improving, declining, or no change.  This information will be displayed automatically on your shift  note.  Outcome: Progressing  Flowsheets (Taken 9/5/2024 0601)  Outcome Evaluation: Patient is alert and oriented. VSS, afebrile. Infusing kaylene fluids. Refusing insulin. Now on a low fat diet since surgery is rescheduled.  Patient on Flagyl and rocephin.Patient is indepdent in the room. LS clear. Patient  has adequate urine output but urine appears to be Dark yellow. Encouraged patient to hydrate and push fluids. Will kaylene to provider interventions as needed. No concerns at this time.  Plan of Care Reviewed With: patient  Goal: Patient-Specific Goal (Individualized)  Description: You can add care plan individualizations to a care plan. Examples of Individualization might be:  \"Parent requests to be called daily at 9am for status\", \"I have a hard time hearing out of my right ear\", or \"Do not touch me to wake me up as it startles  me\".  Outcome: Progressing  Goal: Absence of Hospital-Acquired Illness or Injury  Outcome: Progressing  Intervention: Identify and Manage Fall Risk  Recent Flowsheet Documentation  Taken 9/5/2024 6887 by Deacon Hahn RN  Safety Promotion/Fall Prevention:   activity " supervised   clutter free environment maintained   nonskid shoes/slippers when out of bed   patient and family education   room near nurse's station   room organization consistent   safety round/check completed   treat reversible contributory factors   treat underlying cause  Intervention: Prevent Skin Injury  Recent Flowsheet Documentation  Taken 9/5/2024 0853 by Deacon Hahn RN  Body Position: position changed independently  Skin Protection: adhesive use limited  Device Skin Pressure Protection:   adhesive use limited   tubing/devices free from skin contact  Intervention: Prevent and Manage VTE (Venous Thromboembolism) Risk  Recent Flowsheet Documentation  Taken 9/5/2024 0853 by Deacon Hahn RN  VTE Prevention/Management: (up ad tasia) SCDs off (sequential compression devices)  Intervention: Prevent Infection  Recent Flowsheet Documentation  Taken 9/5/2024 0853 by Deacon Hahn RN  Infection Prevention:   equipment surfaces disinfected   hand hygiene promoted   rest/sleep promoted   single patient room provided  Goal: Optimal Comfort and Wellbeing  Outcome: Progressing  Goal: Readiness for Transition of Care  Outcome: Progressing     Problem: Cholecystectomy  Goal: Absence of Bleeding  Outcome: Progressing  Goal: Effective Bowel Elimination  Outcome: Progressing  Intervention: Enhance Bowel Motility and Elimination  Recent Flowsheet Documentation  Taken 9/5/2024 0853 by Deacon Hahn RN  Bowel Motility Enhancement: ambulation promoted  Goal: Fluid and Electrolyte Balance  Outcome: Progressing  Goal: Absence of Infection Signs and Symptoms  Outcome: Progressing  Intervention: Prevent or Manage Infection  Recent Flowsheet Documentation  Taken 9/5/2024 0853 by Deacon Hahn RN  Infection Management: aseptic technique maintained  Goal: Anesthesia/Sedation Recovery  Outcome: Progressing  Intervention: Optimize Anesthesia Recovery  Recent Flowsheet Documentation  Taken 9/5/2024 0853 by Deacon Hahn RN  Safety  Promotion/Fall Prevention:   activity supervised   clutter free environment maintained   nonskid shoes/slippers when out of bed   patient and family education   room near nurse's station   room organization consistent   safety round/check completed   treat reversible contributory factors   treat underlying cause  Goal: Pain Control and Function  Outcome: Progressing  Goal: Nausea and Vomiting Relief  Outcome: Progressing  Goal: Effective Urinary Elimination  Outcome: Progressing  Intervention: Monitor and Manage Urinary Retention  Recent Flowsheet Documentation  Taken 9/5/2024 0853 by Deacon Hahn RN  Urinary Elimination Promotion: frequent voiding encouraged  Goal: Effective Oxygenation and Ventilation  Outcome: Progressing  Intervention: Optimize Oxygenation and Ventilation  Recent Flowsheet Documentation  Taken 9/5/2024 0853 by Deacon Hahn RN  Airway/Ventilation Management: calming measures promoted  Head of Bed (HOB) Positioning: HOB at 15 degrees     Problem: Pain Acute  Goal: Optimal Pain Control and Function  Outcome: Progressing  Intervention: Prevent or Manage Pain  Recent Flowsheet Documentation  Taken 9/5/2024 0853 by Deacon Hahn RN  Sensory Stimulation Regulation:   auditory stimulation minimized   care clustered   lighting decreased   quiet environment promoted   visual stimulation minimized  Sleep/Rest Enhancement:   regular sleep/rest pattern promoted   relaxation techniques promoted   room darkened   comfort measures  Bowel Elimination Promotion:   adequate fluid intake promoted   ambulation promoted  Medication Review/Management:   medications reviewed   high-risk medications identified  Intervention: Optimize Psychosocial Wellbeing  Recent Flowsheet Documentation  Taken 9/5/2024 0853 by Deacon Hahn RN  Supportive Measures:   active listening utilized   decision-making supported   problem-solving facilitated   relaxation techniques promoted   verbalization of feelings encouraged

## 2024-09-05 NOTE — CONSULTS
General Surgery Consultation    Crispin Rojas MRN# [id number]   Age: 64 year old YOB: 1960     Date of Admission:  9/4/2024    Reason for consult:            Abdominal pain, right upper quadrant       Requesting physician:            Barbara Ruff PA-C                Assessment and Plan:   Assessment:   Crispin Rojas is a 64 year old male with acute cholecystitis.     Comorbidities:   has a past medical history of Antiplatelet or antithrombotic long-term use, Ascending aorta dilatation (H24), Cerebral artery occlusion with cerebral infarction (H), Cerebral infarction (H), Gastroesophageal reflux disease with esophagitis, H/O blood clots (2022), Hyperlipidemia LDL goal <70, Hypertension, Nonalcoholic steatohepatitis (11/30/2022), Numbness and tingling, Obesity, GILA (obstructive sleep apnea) (10/22/2018), PVD (peripheral vascular disease) (H24), Renal stone, Tremor, and Type 2 diabetes mellitus with diabetic polyneuropathy, with long-term current use of insulin (H).      Plan:   I have offered the patient a laparoscopic cholecystectomy.     We have discussed the indication, alternatives, risks and expected recovery for laparoscopic cholecystectomy.  Specifically we have discussed incisions, general anesthesia, potential postoperative infections, bleeding, open conversion, common bile duct injury, injury to intra-abdominal organs, adhesions leading to bowel obstruction, retained common bile duct stone, bile leak, DVT, PE, hernia, post cholecystectomy diarrhea, as well as expected recovery, postoperative dietary restrictions and physical limitations.  We have discussed the recommended interventions and treatments for complications.  All questions have been answered to the best of my ability.    He elects to proceed with surgery.                  Chief Complaint:   Abdominal pain, right upper quadrant     History is obtained from the patient.         History of Present Illness:   Crispin Rojas  is a 64 year old male who presents with right upper quadrant abdominal pain for the past 2 days.  The pain is constant.  He has had similar pain in the past, but usually much less severe.  There is not an association with eating any type of food.  Positive for associated symptoms of nausea, vomiting, and fever up to 100F.  He  does not have a history of jaundice or dark urine.  He  has not had pancreatitis in the past.              Past Medical History:    has a past medical history of Antiplatelet or antithrombotic long-term use, Ascending aorta dilatation (H24), Cerebral artery occlusion with cerebral infarction (H), Cerebral infarction (H), Gastroesophageal reflux disease with esophagitis, H/O blood clots (2022), Hyperlipidemia LDL goal <70, Hypertension, Nonalcoholic steatohepatitis (11/30/2022), Numbness and tingling, Obesity, GILA (obstructive sleep apnea) (10/22/2018), PVD (peripheral vascular disease) (H24), Renal stone, Tremor, and Type 2 diabetes mellitus with diabetic polyneuropathy, with long-term current use of insulin (H).          Past Surgical History:     Past Surgical History:   Procedure Laterality Date    AMPUTATE FOOT Left 8/18/2016    Procedure: AMPUTATE FOOT;  Surgeon: Jack Younger DPM;  Location: [facility]    AMPUTATE TOE(S) Right 2/1/2016    Procedure: AMPUTATE TOE(S);  Surgeon: Rachelle Manriquez DPM, Pod;  Location: [facility]    AMPUTATE TOE(S) Right 8/2/2019    Procedure: Right fourth toe partial amputation for treatment of osteomyelitis.;  Surgeon: Jack Younger DPM;  Location: [facility]    AMPUTATE TOE(S) Left 11/8/2019    Procedure: Left second toe amputation at the metatarsophalangeal joint.;  Surgeon: Rachelle Manriquez DPM, Podiatry/Foot and Ankle Surgery;  Location: [facility]    AMPUTATE TOE(S) Right 6/5/2022    Procedure: AMPUTATION OF RIGHT GREAT TOE;  Surgeon: Wili Quiles DPM;  Location: [facility]    AMPUTATE TOE(S) Right 7/28/2022    Procedure: Right foot partial first metatarsal  resection;  Surgeon: Tiny Soto DPM;  Location: [facility]    AMPUTATE TOE(S) Left 6/18/2024    Procedure: Left foot transmetatarsal amputation;  Surgeon: Tiny Soto DPM;  Location: [facility]    ANGIOGRAM      BIOPSY BONE FOOT Right 7/24/2022    Procedure: Bone biopsy, right first metatarsal.;  Surgeon: Valentino Molina DPM;  Location: [facility]    COLONOSCOPY      COMBINED CYSTOSCOPY, RETROGRADES, URETEROSCOPY, INSERT STENT Left 8/21/2016    Procedure: COMBINED CYSTOSCOPY, RETROGRADES, URETEROSCOPY, INSERT STENT;  Surgeon: Artemio Valenzuela MD;  Location: [facility]    COMBINED CYSTOSCOPY, RETROGRADES, URETEROSCOPY, LASER HOLMIUM LITHOTRIPSY URETER(S), INSERT STENT Left 10/3/2016    Procedure: COMBINED CYSTOSCOPY, RETROGRADES, URETEROSCOPY, LASER HOLMIUM LITHOTRIPSY URETER(S), INSERT STENT;  Surgeon: Artemio Valenzuela MD;  [facility]: [facility]    EXTRACORPOREAL SHOCK WAVE LITHOTRIPSY (ESWL) Left 9/8/2016    Procedure: EXTRACORPOREAL SHOCK WAVE LITHOTRIPSY (ESWL);  Surgeon: Artemio Valenzuela MD;  Location: [facility]    INCISION AND DRAINAGE FOOT, COMBINED Right 7/24/2022    Procedure: Incision and drainage, right foot ;  Surgeon: Valentino Molina DPM;  [facility]: [facility]    IRRIGATION AND DEBRIDEMENT FOOT, COMBINED Left 10/19/2023    Procedure: Left foot second metatarsal resection , . Left plantar ulcer excisional debridement, down to and including tendon, with closure, site measuring 4 cm x 2 cm x 1 cm;  Surgeon: Tiny Soto DPM;  Location: [facility]    IRRIGATION AND DEBRIDEMENT HAND, COMBINED Right 7/30/2024    Procedure: Irrigation and Debridement of Right Dorsal Hand and wrist;  Surgeon: June Mcdaniels MD;  Location: [facility]    OSTEOTOMY FOOT Right 11/30/2022    Procedure: 1. Right second metatarsal floating osteotomy  2. Right second digit proximal phalanx arthroplasty 3. Right percutaneous flexor tenotomy;  Surgeon: Tiny Soto DPM;  Location: [facility]    OSTEOTOMY FOOT Right  1/19/2023    Procedure: Right foot third metatarsal head excision, ulcer debridement, matatarsal osteotomies;  Surgeon: Tiny Soto DPM;  Location: [facility]    RECESSION GASTROCNEMIUS Right 2/1/2016    Procedure: RECESSION GASTROCNEMIUS;  Surgeon: Rachelle Manriquez DPM, Pod;  Location: [facility]             Social History:     Social History     Tobacco Use    Smoking status: Never    Smokeless tobacco: Never   Substance Use Topics    Alcohol use: Yes     Comment: rare---red wine 3x per month             Family History:   Family history reviewed and is not pertinent.         Allergies:     Allergies   Allergen Reactions    Bactrim [Sulfamethoxazole-Trimethoprim] Nausea and Vomiting    Statins Swelling     Other reaction(s): Other (see comments)  SIMCOR caused edema in extremities  Only Simvastatin    Sulfatolamide Nausea and Vomiting    Niacin Swelling     SIMCOR caused edema in extremities    Simvastatin Swelling     SIMCOR caused edema in extremities    Sulfa Antibiotics Nausea             Medications:     Current Facility-Administered Medications   Medication Dose Route Frequency Provider Last Rate Last Admin    acetaminophen (TYLENOL) tablet 650 mg  650 mg Oral Q4H PRN Barbara Ruff PA-C        Or    acetaminophen (TYLENOL) Suppository 650 mg  650 mg Rectal Q4H PRN Barbara Ruff PA-C        amLODIPine (NORVASC) tablet 10 mg  10 mg Oral Daily Barbara Ruff PA-C        [Held by provider] apixaban ANTICOAGULANT (ELIQUIS) tablet 5 mg  5 mg Oral BID Barbara Ruff PA-C        [Held by provider] atorvastatin (LIPITOR) tablet 40 mg  40 mg Oral At Bedtime Barbara Ruff PA-C        calcium carbonate 500 mg (elemental) (OSCAL) tablet 500 mg  1 tablet Oral BID Barbara Ruff PA-C   500 mg at 09/05/24 0845    cefTRIAXone (ROCEPHIN) 2 g vial to attach to  ml bag for ADULTS or NS 50 ml bag for PEDS  2 g Intravenous Q24H Barbara Ruff PA-C        glucose gel 15-30 g  15-30 g Oral Q15 Min  PRN Speedy, Barbara, PA-C        Or    dextrose 50 % injection 25-50 mL  25-50 mL Intravenous Q15 Min PRN Speedy, Barbara, PA-C        Or    glucagon injection 1 mg  1 mg Subcutaneous Q15 Min PRN Speedy, Barbara, PA-C        ferrous sulfate (FEROSUL) tablet 325 mg  325 mg Oral Daily with breakfast Speedy, Barbara, PA-C   325 mg at 09/05/24 0845    gabapentin (NEURONTIN) capsule 400 mg  400 mg Oral TID SpeedyAury englishn, PA-C   400 mg at 09/05/24 1431    [Held by provider] hydrochlorothiazide (MICROZIDE) capsule 12.5 mg  12.5 mg Oral QAM Barbara Ruff PA-C        HYDROmorphone (DILAUDID) injection 0.2 mg  0.2 mg Intravenous Q2H PRN Speedy, Barbara, PA-C        HYDROmorphone (DILAUDID) injection 0.4 mg  0.4 mg Intravenous Q2H PRN SpeedyBarbara de santiago, PA-C        insulin aspart (NovoLOG) injection (RAPID ACTING)  1-7 Units Subcutaneous Q4H Barbara Ruff, PA-C        insulin degludec (TRESIBA) 100 UNIT/ML injection 30 Units  30 Units Subcutaneous QAM Speedy, Barbara, PA-C        lactated ringers infusion   Intravenous Continuous SpeedyAury de santiagon, PA-C 100 mL/hr at 09/05/24 0832 New Bag at 09/05/24 0832    lidocaine (LMX4) cream   Topical Q1H PRN SpeedyAury de santiagon, PA-C        lidocaine 1 % 0.1-1 mL  0.1-1 mL Other Q1H PRN Speedy, Barbara, PA-C        melatonin tablet 5 mg  5 mg Oral At Bedtime PRN SpeedyAury de santiagon, PA-C        metroNIDAZOLE (FLAGYL) infusion 500 mg  500 mg Intravenous Q8H Speedy, Barbara, PA-C   500 mg at 09/05/24 1038    ondansetron (ZOFRAN ODT) ODT tab 4 mg  4 mg Oral Q6H PRN SpeedyBarbara de santiago PA-C        Or    ondansetron (ZOFRAN) injection 4 mg  4 mg Intravenous Q6H PRN Barbara Ruff PA-C   4 mg at 09/04/24 1822    oxyCODONE (ROXICODONE) tablet 5 mg  5 mg Oral Q4H PRN Barbara Ruff PA-C        oxyCODONE IR (ROXICODONE) half-tab 2.5 mg  2.5 mg Oral Q4H PRN Barbara Ruff PA-C        senna-docusate (SENOKOT-S/PERICOLACE) 8.6-50 MG per tablet 1 tablet  1 tablet  Oral BID PRN Barbara Ruff PA-C        Or    senna-docusate (SENOKOT-S/PERICOLACE) 8.6-50 MG per tablet 2 tablet  2 tablet Oral BID PRN Barbara Ruff PA-C        sodium chloride (PF) 0.9% PF flush 3 mL  3 mL Intracatheter q1 min prn Barbara Ruff PA-C        sodium chloride (PF) 0.9% PF flush 3 mL  3 mL Intracatheter Q8H Barbara Ruff PA-C   3 mL at 09/05/24 1039     Current Facility-Administered Medications   Medication Dose Route Frequency Provider Last Rate Last Admin    amLODIPine (NORVASC) tablet 10 mg  10 mg Oral Daily Barbara Ruff PA-C        [Held by provider] apixaban ANTICOAGULANT (ELIQUIS) tablet 5 mg  5 mg Oral BID Barbara Ruff PA-C        [Held by provider] atorvastatin (LIPITOR) tablet 40 mg  40 mg Oral At Bedtime Barbara Ruff PA-SANDEEP        calcium carbonate 500 mg (elemental) (OSCAL) tablet 500 mg  1 tablet Oral BID Barbara Ruff PA-C   500 mg at 09/05/24 0845    cefTRIAXone (ROCEPHIN) 2 g vial to attach to  ml bag for ADULTS or NS 50 ml bag for PEDS  2 g Intravenous Q24H Barbara Ruff PA-C        ferrous sulfate (FEROSUL) tablet 325 mg  325 mg Oral Daily with breakfast Barbara Ruff PA-C   325 mg at 09/05/24 0845    gabapentin (NEURONTIN) capsule 400 mg  400 mg Oral TID Barbara Ruff PA-C   400 mg at 09/05/24 1431    [Held by provider] hydrochlorothiazide (MICROZIDE) capsule 12.5 mg  12.5 mg Oral QAM Barbara Ruff PA-C        insulin aspart (NovoLOG) injection (RAPID ACTING)  1-7 Units Subcutaneous Q4H Barbara Ruff PA-C        insulin degludec (TRESIBA) 100 UNIT/ML injection 30 Units  30 Units Subcutaneous QAM Barbara Ruff PA-SANDEEP        metroNIDAZOLE (FLAGYL) infusion 500 mg  500 mg Intravenous Q8H Barbara Ruff PA-C   500 mg at 09/05/24 1038    sodium chloride (PF) 0.9% PF flush 3 mL  3 mL Intracatheter Q8H Barbara Ruff PA-C   3 mL at 09/05/24 1039            Review of Systems:   The 10 point review of systems is  "negative other than noted in the HPI.          Physical Exam:   /60 (BP Location: Right arm)   Pulse 80   Temp 98.2  F (36.8  C) (Oral)   Resp 20   Ht 1.803 m (5' 11\")   Wt 107.6 kg (237 lb 3.4 oz)   SpO2 99%   BMI 33.08 kg/m    General - Well developed, well nourished male in no apparent distress  HEENT:  Head normocephalic and atraumatic, pupils equal and round, conjunctivae clear, no scleral icterus, mucous membranes moist, external ears and nose normal  Neck: Supple without thyromegaly or masses  Lymphatic: No cervical, or supraclavicular lymphadenopathy  Lungs: Clear to auscultation bilaterally  Heart: regular rate and rhythm, no murmurs  Abdomen:   Soft, flat, non-distended with mild tenderness noted in the right upper quadrant .   Extremities: Warm without edema  Neurologic: nonfocal  Psychiatric: Mood and affect appropriate  Skin: Without lesions, rashes, or jaundice         Data:     WBC -   Lab Results   Component Value Date    WBC 11.1 (H) 09/05/2024       HgB -   Lab Results   Component Value Date    HGB 11.3 (L) 09/05/2024       Plt-   Lab Results   Component Value Date     09/05/2024       Liver Function Studies -   Recent Labs   Lab Test 09/05/24  0740   PROTTOTAL 6.7   ALBUMIN 3.2*   BILITOTAL 1.3*   ALKPHOS 53   AST 20   ALT 15       Lipase-   Lab Results   Component Value Date    LIPASE 28 09/04/2024         Imaging:  All imaging studies reviewed by me.    Results for orders placed or performed during the hospital encounter of 09/04/24   US Abdomen Limited (RUQ)    Narrative    US ABDOMEN LIMITED 9/4/2024 2:42 PM    CLINICAL HISTORY: RUQ pain.    TECHNIQUE: Limited abdominal ultrasound.    COMPARISON: August 15, 2024    FINDINGS:    GALLBLADDER: Cholelithiasis and gallbladder wall thickening concerning  for acute vs. Chronic cholecystitis, sonographer reports no focal  tenderness over the gallbladder.    BILE DUCTS: There is no biliary dilatation. The common duct measures " 4  mm.    LIVER: Moderate increased echotexture likely related to at least  moderate fatty infiltration, no focal lesions.    RIGHT KIDNEY: No hydronephrosis.    PANCREAS: The visualized portions of the pancreas are normal.    No ascites.      Impression    IMPRESSION:  1.  Cholelithiasis with gallbladder wall thickening up to 8 mm in the  somewhat distended gallbladder, differential includes acute and  chronic cystitis.  2.  At least moderate hepatic steatosis.    JOHN TATE MD         SYSTEM ID:  [id number]     *Note: Due to a large number of results and/or encounters for the requested time period, some results have not been displayed. A complete set of results can be found in Results Review.     This note was created using voice recognition software. Undetected word substitutions or other errors may have occurred.     Time spent with the patient, reviewing the EMR, reviewing laboratory and imaging studies, more than 50% of which was counseling and coordinating care:  45 minutes.     Marilia Burgess MD

## 2024-09-05 NOTE — PROGRESS NOTES
[facility]  Hospitalist Progress Note  Johnnie Nathan MD 09/05/2024    Reason for Stay (Diagnosis): cholecystitis         Assessment and Plan:      Summary of Stay: Crispin Rojas is a 64 year old male with Pmhx of DVT on apixaban, CVA, essential hypertension, hypercholesteremia, T2DM, peripheral vascular disease, obstructive sleep apnea, SNYDER Cirrhosis, who was admitted on 9/4/2024 with acute cholecystitis.     In the ED temp 99.5F, VSS. Lab work with neutrophilic leukocytosis, CMP with borderline hyponatremia, total bilirubin of 1.6 with normal AST, ALT, alk phos.  No lactic acidosis.  Upper quadrant ultrasound with cholelithiasis, gallbladder wall thickening concerning for acute versus chronic cholecystitis.  No biliary dilation, common duct measuring 4 mm.  Given Rocephin in the ED.  Admission was requested from hospitalist service for further cares and monitoring.  General surgery was contacted from the emergency department who did not recommend cholecystectomy from the emergency department due to anticoagulation.  DOAC has been held since admit    Plans today:  - appreciate surgery input.  Lap rosalva being planned for tomorrow AM  - can discontinue Flagyl.    - Continue ROcephin  - Hold Tresiba  - NPO after midnight     Sepsis 2/2 Acute Cholecystitis   Presented with 72 hours abdominal pain, nausea, vomiting. LGF.   Known history of gallstones and SNYDER cirrhosis per patient is deemed mild. No previous abdominal surgeries or history of adverse reaction to anesthesia.   -consult General Surgery   -NPO after midnight  -hold PTA Eliquis, last dose AM of 9/4   -IV abx with Rocephin, Flagyl  -analgesia, antiemetics PRN        SNYDER Cirrhosis   On exam no ascites or e/o decompensated cirrhosis. US on admission noted moderate hepatic steatosis, mild rated on US from August 15th 2024.  Follows with [facility].  Patient reports that cirrhosis was previously deemed mild.     Type 2 DM c/b Chronic  "Left Diabetic Foot Ulcer, Polyneuropathy  Last A1c of 6.8 on PTA regimen of  Tresiba 35-55 U qAM, Metformin XR, 1000 mg BID, Novolog with a CHO ratio of 1:9, and Ozempic, 2 mg weekly.   -Monitors sugars via CGM  -hold metformin, ozempic   - did not receive Tresiba today and BG remain in 100-200 range since  - continue to hold Tresiba given NPO tomorrow AM.  If BG rise overnight can give reduced dose of Tresiba in AM   -sliding scale insulin PRN      History CVA  PAD  HLD  Prior DVT: History of lacunar stroke in 2010.  He also has peripheral arterial disease and hyperlipidemia.  He is on Eliquis for previous DVT in addition to atorvastatin 40 mg daily.  -Hold Eliquis  -resume Atorvastatin at discharge      CKD stage II: Creatinine baseline is likely 1-1.1      HTN  Essential tremor: He is on lisinopril 40 mg daily, HCTZ 12.5 mg daily, amlodipine 5 mg twice daily, and propranolol 20 mg daily.  -resumed propanolol, hydrochlorothiazide and lisinopril           DVT Prophylaxis: Pneumatic Compression Devices  Mccarthy Catheter: Not present     Cardiac Monitoring: None     Code Status:  Full Code     DISPO:  tomorrow after lap rosalva         Interval History (Subjective):      No complaints.  Pain controlled currently.  Anticoagulant on hold.  Patient met with the surgeon and understands plans for laparoscopic cholecystectomy tomorrow morning                  Physical Exam:      Last Vital Signs:  /60 (BP Location: Right arm)   Pulse 80   Temp 98.2  F (36.8  C) (Oral)   Resp 20   Ht 1.803 m (5' 11\")   Wt 107.6 kg (237 lb 3.4 oz)   SpO2 99%   BMI 33.08 kg/m      No intake or output data in the 24 hours ending 09/05/24 1527    Constitutional: Awake, alert, cooperative, no apparent distress     Respiratory: Clear to auscultation bilaterally, no crackles or wheezing   Cardiovascular: Regular rate and rhythm, normal S1 and S2, and no murmur noted   Abdomen: Normal bowel sounds, soft, non-distended, RUQ TTP   Skin: No " rashes, no cyanosis, dry to touch   Neuro: Alert and oriented x3, no weakness, numbness, memory loss   Extremities: No edema, normal range of motion   Other(s):        All other systems: Negative          Medications:      All current medications were reviewed with changes reflected in problem list.         Data:      All new lab and imaging data was reviewed.   Labs:       Lab Results   Component Value Date     09/05/2024     09/04/2024     08/01/2024     05/10/2021     04/20/2021     01/13/2021    Lab Results   Component Value Date    CHLORIDE 99 09/05/2024    CHLORIDE 99 09/04/2024    CHLORIDE 106 08/01/2024    CHLORIDE 109 07/25/2022    CHLORIDE 106 07/24/2022    CHLORIDE 104 07/23/2022    CHLORIDE 108 05/10/2021    CHLORIDE 110 04/20/2021    CHLORIDE 106 01/13/2021    Lab Results   Component Value Date    BUN 13.9 09/05/2024    BUN 16.1 09/04/2024    BUN 21.6 08/01/2024    BUN 16 07/25/2022    BUN 16 07/24/2022    BUN 17 07/23/2022    BUN 15 05/10/2021    BUN 22 04/20/2021    BUN 25 01/13/2021      Lab Results   Component Value Date    POTASSIUM 3.8 09/05/2024    POTASSIUM 3.7 09/04/2024    POTASSIUM 4.0 08/01/2024    POTASSIUM 4.4 07/26/2022    POTASSIUM 4.1 07/25/2022    POTASSIUM 3.9 07/24/2022    POTASSIUM 3.6 05/10/2021    POTASSIUM 4.1 04/20/2021    POTASSIUM 4.3 01/13/2021    Lab Results   Component Value Date    CO2 20 09/05/2024    CO2 22 09/04/2024    CO2 21 08/01/2024    CO2 25 07/25/2022    CO2 26 07/24/2022    CO2 27 07/23/2022    CO2 27 05/10/2021    CO2 27 04/20/2021    CO2 25 01/13/2021    Lab Results   Component Value Date    CR 1.11 09/05/2024    CR 1.08 09/04/2024    CR 1.00 08/01/2024    CR 0.86 05/10/2021    CR 0.92 04/20/2021    CR 1.01 01/13/2021        Recent Labs   Lab 09/05/24  0740   WBC 11.1*   HGB 11.3*   HCT 35.0*   MCV 88         Imaging:   No results found for this or any previous visit (from the past 24 hour(s)).

## 2024-09-05 NOTE — PLAN OF CARE
"Goal Outcome Evaluation:      Plan of Care Reviewed With: patient    Overall Patient Progress: improvingOverall Patient Progress: improving    Outcome Evaluation: A&O x 4. VSS. Up independently. On room air. Abx Flagyl and Rpcephin. LR running at 100 mL/h . Refused sliding scale insulin. Tolerating clear liquid diet.  NPO at midnight for possible surgery.      Problem: Adult Inpatient Plan of Care  Goal: Plan of Care Review  Description: The Plan of Care Review/Shift note should be completed every shift.  The Outcome Evaluation is a brief statement about your assessment that the patient is improving, declining, or no change.  This information will be displayed automatically on your shift  note.  Outcome: Progressing  Flowsheets (Taken 9/4/2024 2215)  Outcome Evaluation: A&O x 4. VSS. Up independently. On room air. Abx Flagyl and Rpcephin. LR running at 100 mL/h . Refused sliding scale insulin. Tolerating clear liquid diet.  NPO at midnight for possible surgery.  Plan of Care Reviewed With: patient  Overall Patient Progress: improving  Goal: Patient-Specific Goal (Individualized)  Description: You can add care plan individualizations to a care plan. Examples of Individualization might be:  \"Parent requests to be called daily at 9am for status\", \"I have a hard time hearing out of my right ear\", or \"Do not touch me to wake me up as it startles  me\".  Outcome: Progressing  Goal: Absence of Hospital-Acquired Illness or Injury  Outcome: Progressing  Intervention: Identify and Manage Fall Risk  Recent Flowsheet Documentation  Taken 9/4/2024 2100 by Clau Blank, RN  Safety Promotion/Fall Prevention:   activity supervised   clutter free environment maintained   nonskid shoes/slippers when out of bed   safety round/check completed  Intervention: Prevent Skin Injury  Recent Flowsheet Documentation  Taken 9/4/2024 2100 by Clau Blank, RN  Body Position: position changed independently  Intervention: Prevent and " Manage VTE (Venous Thromboembolism) Risk  Recent Flowsheet Documentation  Taken 9/4/2024 2100 by Clau Blank, RN  VTE Prevention/Management: SCDs off (sequential compression devices)  Intervention: Prevent Infection  Recent Flowsheet Documentation  Taken 9/4/2024 2100 by Clau Blank, RN  Infection Prevention:   hand hygiene promoted   single patient room provided  Goal: Optimal Comfort and Wellbeing  Outcome: Progressing  Goal: Readiness for Transition of Care  Outcome: Progressing

## 2024-09-05 NOTE — CONSULTS
Care Management Initial Consult    General Information  Assessment completed with: Patient,    Type of CM/SW Visit: Initial Assessment    Primary Care Provider verified and updated as needed: Yes   Readmission within the last 30 days:        Reason for Consult: utilization management concerns  Advance Care Planning:            Communication Assessment  Patient's communication style: spoken language (English or Bilingual)    Hearing Difficulty or Deaf: no   Wear Glasses or Blind: no    Cognitive  Cognitive/Neuro/Behavioral: WDL                      Living Environment:   People in home: spouse  Sydney  Current living Arrangements: house      Able to return to prior arrangements: yes       Family/Social Support:  Care provided by: self  Provides care for: no one  Marital Status:   Support system: Wife  Sydney       Description of Support System: Supportive, Involved         Current Resources:   Patient receiving home care services: No        Community Resources: None  Equipment currently used at home: none  Supplies currently used at home:      Employment/Financial:  Employment Status:          Financial Concerns:             Does the patient's insurance plan have a 3 day qualifying hospital stay waiver?  Yes     Which insurance plan 3 day waiver is available? Alternative insurance waiver    Will the waiver be used for post-acute placement? No    Lifestyle & Psychosocial Needs:  Social Determinants of Health     Food Insecurity: Low Risk  (9/4/2024)    Food Insecurity     Within the past 12 months, did you worry that your food would run out before you got money to buy more?: No     Within the past 12 months, did the food you bought just not last and you didn t have money to get more?: No   Depression: Not at risk (3/25/2024)    PHQ-2     PHQ-2 Score: 0   Housing Stability: Low Risk  (9/4/2024)    Housing Stability     Do you have housing? : Yes     Are you worried about losing your housing?: No   Tobacco Use: Low  Risk  (8/23/2024)    Patient History     Smoking Tobacco Use: Never     Smokeless Tobacco Use: Never     Passive Exposure: Not on file   Financial Resource Strain: Low Risk  (9/4/2024)    Financial Resource Strain     Within the past 12 months, have you or your family members you live with been unable to get utilities (heat, electricity) when it was really needed?: No   Alcohol Use: Not At Risk (10/18/2023)    AUDIT-C     Frequency of Alcohol Consumption: Monthly or less     Average Number of Drinks: 1 or 2     Frequency of Binge Drinking: Never   Transportation Needs: Low Risk  (9/4/2024)    Transportation Needs     Within the past 12 months, has lack of transportation kept you from medical appointments, getting your medicines, non-medical meetings or appointments, work, or from getting things that you need?: No   Physical Activity: Sufficiently Active (7/5/2024)    Exercise Vital Sign     Days of Exercise per Week: 6 days     Minutes of Exercise per Session: 80 min   Interpersonal Safety: Low Risk  (7/5/2024)    Interpersonal Safety     Do you feel physically and emotionally safe where you currently live?: Yes     Within the past 12 months, have you been hit, slapped, kicked or otherwise physically hurt by someone?: No     Within the past 12 months, have you been humiliated or emotionally abused in other ways by your partner or ex-partner?: No   Stress: No Stress Concern Present (7/5/2024)    [employer] [place] [employer] - Occupational Stress Questionnaire     Feeling of Stress : Not at all   Social Connections: Socially Integrated (7/5/2024)    Social Connection and Isolation Panel [NHANES]     Frequency of Communication with Friends and Family: Twice a week     Frequency of Social Gatherings with Friends and Family: More than three times a week     Attends Hindu Services: More than 4 times per year     Active Member of Clubs or Organizations: Yes     Attends Club or Organization Meetings: More  than 4 times per year     Marital Status:    Health Literacy: Not on file       Functional Status:  Prior to admission patient needed assistance:   Dependent ADLs:: Independent  Dependent IADLs:: Independent       Mental Health Status:          Chemical Dependency Status:                Values/Beliefs:  Spiritual, Cultural Beliefs, Pentecostalism Practices, Values that affect care:                 Discussed  Partnership in Safe Discharge Planning  document with patient/family: No    Additional Information:  SW met with patient due to an elevated unplanned readmission risk score of 22%. He resides in a private home with his spouse & was independent with activities of daily living prior. Patient still works at the [employer] & drives. He anticipates returning home with his spouse & family providing transportation to home. Patient denied any anticipated discharge needs or concerns.     Next Steps: No SW needs identified so SW is signing off. SW can be re-consulted if SW needs arise.     YESSICA Mao

## 2024-09-05 NOTE — PLAN OF CARE
"Goal Outcome Evaluation:      Plan of Care Reviewed With: patient    Overall Patient Progress: no change    Orientation: Alert and oriented x4  VSS. 98% on RA. afebrile.   LS: clear and equal bilaterally  GI: Passing gas. no BM. Denies N/V.   : Adequate urine output.   Skin: healing dog bite to right hand. Home compression sleeve in place.   Activity: Independent. Pt slept comfortably throughout shift.   Pain: 3/10 abdominal pain. Declining PRN interventions  Updates/Plan: BG checks q4 hours. Pt refusing insulin overnight to correct BG.0600  using hospital machine, 102 on patient's dexcom. NPO since midnight for possible surgery later today. Continue with current cares.           Problem: Adult Inpatient Plan of Care  Goal: Plan of Care Review  Description: The Plan of Care Review/Shift note should be completed every shift.  The Outcome Evaluation is a brief statement about your assessment that the patient is improving, declining, or no change.  This information will be displayed automatically on your shift  note.  Outcome: Progressing  Flowsheets (Taken 9/5/2024 0507)  Plan of Care Reviewed With: patient  Overall Patient Progress: no change  Goal: Patient-Specific Goal (Individualized)  Description: You can add care plan individualizations to a care plan. Examples of Individualization might be:  \"Parent requests to be called daily at 9am for status\", \"I have a hard time hearing out of my right ear\", or \"Do not touch me to wake me up as it startles  me\".  Outcome: Progressing  Goal: Absence of Hospital-Acquired Illness or Injury  Outcome: Progressing  Intervention: Identify and Manage Fall Risk  Recent Flowsheet Documentation  Taken 9/5/2024 0000 by Alethea Brown, RN  Safety Promotion/Fall Prevention:   activity supervised   clutter free environment maintained   nonskid shoes/slippers when out of bed   patient and family education   room near nurse's station   room organization consistent   safety " round/check completed   treat reversible contributory factors   treat underlying cause  Intervention: Prevent Skin Injury  Recent Flowsheet Documentation  Taken 9/5/2024 0000 by Alethea Brown RN  Body Position: position changed independently  Skin Protection: adhesive use limited  Device Skin Pressure Protection:   adhesive use limited   tubing/devices free from skin contact  Taken 9/4/2024 2312 by Alethea Brown RN  Body Position: position changed independently  Intervention: Prevent and Manage VTE (Venous Thromboembolism) Risk  Recent Flowsheet Documentation  Taken 9/5/2024 0000 by Alethea Brown RN  VTE Prevention/Management: (up ad tasia) SCDs off (sequential compression devices)  Intervention: Prevent Infection  Recent Flowsheet Documentation  Taken 9/5/2024 0000 by Alethea Brown RN  Infection Prevention:   equipment surfaces disinfected   hand hygiene promoted   rest/sleep promoted   single patient room provided  Goal: Optimal Comfort and Wellbeing  Outcome: Progressing  Intervention: Monitor Pain and Promote Comfort  Recent Flowsheet Documentation  Taken 9/4/2024 2312 by Alethea Brown RN  Pain Management Interventions: medication offered but refused  Goal: Readiness for Transition of Care  Outcome: Progressing     Problem: Cholecystectomy  Goal: Absence of Bleeding  Outcome: Progressing  Goal: Effective Bowel Elimination  Outcome: Progressing  Intervention: Enhance Bowel Motility and Elimination  Recent Flowsheet Documentation  Taken 9/5/2024 0000 by Alethea Brown RN  Bowel Motility Enhancement: ambulation promoted  Goal: Fluid and Electrolyte Balance  Outcome: Progressing  Goal: Absence of Infection Signs and Symptoms  Outcome: Progressing  Intervention: Prevent or Manage Infection  Recent Flowsheet Documentation  Taken 9/5/2024 0000 by Alethea Brown RN  Infection Management: aseptic technique maintained  Goal: Anesthesia/Sedation  Recovery  Outcome: Progressing  Intervention: Optimize Anesthesia Recovery  Recent Flowsheet Documentation  Taken 9/5/2024 0000 by Alethea Brown, RN  Safety Promotion/Fall Prevention:   activity supervised   clutter free environment maintained   nonskid shoes/slippers when out of bed   patient and family education   room near nurse's station   room organization consistent   safety round/check completed   treat reversible contributory factors   treat underlying cause  Goal: Pain Control and Function  Outcome: Progressing  Intervention: Prevent or Manage Pain  Recent Flowsheet Documentation  Taken 9/4/2024 2312 by Alethea Brown RN  Pain Management Interventions: medication offered but refused  Goal: Nausea and Vomiting Relief  Outcome: Progressing  Goal: Effective Urinary Elimination  Outcome: Progressing  Intervention: Monitor and Manage Urinary Retention  Recent Flowsheet Documentation  Taken 9/5/2024 0000 by Alethea Brown RN  Urinary Elimination Promotion: frequent voiding encouraged  Goal: Effective Oxygenation and Ventilation  Outcome: Progressing  Intervention: Optimize Oxygenation and Ventilation  Recent Flowsheet Documentation  Taken 9/5/2024 0000 by Alethea Brown RN  Airway/Ventilation Management: calming measures promoted  Head of Bed (HOB) Positioning: HOB at 15 degrees  Taken 9/4/2024 2312 by Alethea Brown RN  Head of Bed (HOB) Positioning: HOB at 15 degrees     Problem: Pain Acute  Goal: Optimal Pain Control and Function  Outcome: Progressing  Intervention: Develop Pain Management Plan  Recent Flowsheet Documentation  Taken 9/4/2024 2312 by Alethea Brown, RN  Pain Management Interventions: medication offered but refused  Intervention: Prevent or Manage Pain  Recent Flowsheet Documentation  Taken 9/5/2024 0000 by Alethea Brown, GINGER  Sensory Stimulation Regulation:   auditory stimulation minimized   care clustered   lighting decreased   quiet  environment promoted   visual stimulation minimized  Sleep/Rest Enhancement:   awakenings minimized   consistent schedule promoted   regular sleep/rest pattern promoted   relaxation techniques promoted   room darkened   noise level reduced  Bowel Elimination Promotion:   adequate fluid intake promoted   ambulation promoted  Medication Review/Management:   medications reviewed   high-risk medications identified  Intervention: Optimize Psychosocial Wellbeing  Recent Flowsheet Documentation  Taken 9/5/2024 0000 by Alethea Brown RN  Supportive Measures:   active listening utilized   decision-making supported   problem-solving facilitated   relaxation techniques promoted   verbalization of feelings encouraged

## 2024-09-05 NOTE — PHARMACY-ADMISSION MEDICATION HISTORY
Pharmacist Admission Medication History    Admission medication history is complete. The information provided in this note is only as accurate as the sources available at the time of the update.    Information Source(s): Patient and CareEverywhere/SureScripts via in-person    Pertinent Information: For patients on insulin therapy:  Do you use sliding scale insulin based on blood sugars? No  Do you typically eat three meals a day? Yes  How many times do you check your blood glucose per day? Freestyle [place]  How many episodes of hypoglycemia do you typically have per month? 10-12      Changes made to PTA medication list:  Added: None  Deleted: donya JOSE  Changed: Novolog to 1 unit/7 g carbs    Allergies reviewed with patient and updates made in EHR: yes    Medication History Completed By: Kermit Severson, [facility] 9/4/2024 9:21 PM    PTA Med List   Medication Sig Last Dose    amLODIPine (NORVASC) 5 MG tablet Take 2 tablets (10 mg) by mouth daily 9/4/2024 at am    atorvastatin (LIPITOR) 40 MG tablet Take 40 mg by mouth at bedtime 9/3/2024 at pm    calcium carbonate (OS-NARA) 500 MG tablet Take 1 tablet by mouth 2 times daily 9/4/2024 at am    Co-Enzyme Q10 100 MG CAPS Take 100 mg by mouth daily  9/4/2024 at am    ELIQUIS ANTICOAGULANT 5 MG tablet Take 1 tablet (5 mg) by mouth 2 times daily 9/4/2024 at am    ferrous sulfate (FEROSUL) 325 (65 Fe) MG tablet Take 325 mg by mouth daily (with breakfast) 9/4/2024 at am    gabapentin (NEURONTIN) 100 MG capsule Take 400 mg by mouth 3 times daily 9/4/2024 at am    hydrochlorothiazide (MICROZIDE) 12.5 MG capsule Take 1 capsule (12.5 mg) by mouth every morning 9/4/2024 at am    insulin aspart (NOVOLOG PEN) 100 UNIT/ML pen Inject subcutaneously 3 times daily (with meals). Inject 1 unit of insulin for every 7 grams of carbs 9/4/2024 at am    insulin degludec (TRESIBA) 100 UNIT/ML pen Inject 35-55 Units Subcutaneous every morning 9/4/2024 at am    ketoconazole (NIZORAL) 2 % external shampoo  Apply topically twice a week On Tuesday and Friday 9/3/2024    lisinopril (ZESTRIL) 40 MG tablet Take 1 tablet (40 mg) by mouth daily 9/4/2024    magnesium gluconate (MAGONATE) 500 MG tablet Take 500 mg by mouth 3 times daily 9/4/2024 at am    metFORMIN (GLUCOPHAGE XR) 500 MG 24 hr tablet TAKE 2 TABLETS(1000 MG) BY MOUTH TWICE DAILY 9/4/2024 at am    Multiple Vitamins-Minerals (MULTIVITAMIN PO) Take 1 tablet by mouth daily  9/4/2024 at am    omeprazole (PRILOSEC) 40 MG DR capsule Take 40 mg by mouth daily 9/4/2024 at am    propranolol (INDERAL) 20 MG tablet Take 20 mg by mouth 2 times daily 9/4/2024 at am

## 2024-09-06 ENCOUNTER — APPOINTMENT (OUTPATIENT)
Dept: SURGERY | Facility: PHYSICIAN GROUP | Age: 64
End: 2024-09-06
Payer: COMMERCIAL

## 2024-09-06 ENCOUNTER — APPOINTMENT (OUTPATIENT)
Dept: GENERAL RADIOLOGY | Facility: CLINIC | Age: 64
End: 2024-09-06
Attending: INTERNAL MEDICINE
Payer: COMMERCIAL

## 2024-09-06 ENCOUNTER — ANESTHESIA EVENT (OUTPATIENT)
Dept: SURGERY | Facility: CLINIC | Age: 64
End: 2024-09-06
Payer: COMMERCIAL

## 2024-09-06 ENCOUNTER — ANESTHESIA (OUTPATIENT)
Dept: SURGERY | Facility: CLINIC | Age: 64
End: 2024-09-06
Payer: COMMERCIAL

## 2024-09-06 LAB
GLUCOSE BLDC GLUCOMTR-MCNC: 193 MG/DL (ref 70–99)
GLUCOSE BLDC GLUCOMTR-MCNC: 204 MG/DL (ref 70–99)
GLUCOSE BLDC GLUCOMTR-MCNC: 224 MG/DL (ref 70–99)
GLUCOSE BLDC GLUCOMTR-MCNC: 229 MG/DL (ref 70–99)
GLUCOSE BLDC GLUCOMTR-MCNC: 267 MG/DL (ref 70–99)
GLUCOSE BLDC GLUCOMTR-MCNC: 396 MG/DL (ref 70–99)

## 2024-09-06 PROCEDURE — 250N000011 HC RX IP 250 OP 636: Performed by: SURGERY

## 2024-09-06 PROCEDURE — 120N000004 HC R&B MS OVERFLOW

## 2024-09-06 PROCEDURE — 360N000076 HC SURGERY LEVEL 3, PER MIN: Performed by: SURGERY

## 2024-09-06 PROCEDURE — 71045 X-RAY EXAM CHEST 1 VIEW: CPT

## 2024-09-06 PROCEDURE — 250N000011 HC RX IP 250 OP 636: Performed by: PHYSICIAN ASSISTANT

## 2024-09-06 PROCEDURE — 710N000009 HC RECOVERY PHASE 1, LEVEL 1, PER MIN: Performed by: SURGERY

## 2024-09-06 PROCEDURE — 88304 TISSUE EXAM BY PATHOLOGIST: CPT | Mod: TC | Performed by: SURGERY

## 2024-09-06 PROCEDURE — 250N000013 HC RX MED GY IP 250 OP 250 PS 637: Performed by: SURGERY

## 2024-09-06 PROCEDURE — 258N000003 HC RX IP 258 OP 636: Performed by: SURGERY

## 2024-09-06 PROCEDURE — 250N000011 HC RX IP 250 OP 636

## 2024-09-06 PROCEDURE — 250N000009 HC RX 250: Performed by: SURGERY

## 2024-09-06 PROCEDURE — G0378 HOSPITAL OBSERVATION PER HR: HCPCS

## 2024-09-06 PROCEDURE — 88304 TISSUE EXAM BY PATHOLOGIST: CPT | Mod: 26 | Performed by: PATHOLOGY

## 2024-09-06 PROCEDURE — 47562 LAPAROSCOPIC CHOLECYSTECTOMY: CPT | Mod: AS | Performed by: PHYSICIAN ASSISTANT

## 2024-09-06 PROCEDURE — 0FT44ZZ RESECTION OF GALLBLADDER, PERCUTANEOUS ENDOSCOPIC APPROACH: ICD-10-PCS | Performed by: SURGERY

## 2024-09-06 PROCEDURE — 370N000017 HC ANESTHESIA TECHNICAL FEE, PER MIN: Performed by: SURGERY

## 2024-09-06 PROCEDURE — 47562 LAPAROSCOPIC CHOLECYSTECTOMY: CPT | Performed by: SURGERY

## 2024-09-06 PROCEDURE — 250N000011 HC RX IP 250 OP 636: Performed by: ANESTHESIOLOGY

## 2024-09-06 PROCEDURE — 272N000001 HC OR GENERAL SUPPLY STERILE: Performed by: SURGERY

## 2024-09-06 PROCEDURE — 258N000003 HC RX IP 258 OP 636: Performed by: ANESTHESIOLOGY

## 2024-09-06 PROCEDURE — 258N000003 HC RX IP 258 OP 636: Performed by: INTERNAL MEDICINE

## 2024-09-06 PROCEDURE — 250N000009 HC RX 250

## 2024-09-06 PROCEDURE — 999N000141 HC STATISTIC PRE-PROCEDURE NURSING ASSESSMENT: Performed by: SURGERY

## 2024-09-06 PROCEDURE — 250N000025 HC SEVOFLURANE, PER MIN: Performed by: SURGERY

## 2024-09-06 PROCEDURE — 82962 GLUCOSE BLOOD TEST: CPT

## 2024-09-06 PROCEDURE — 250N000013 HC RX MED GY IP 250 OP 250 PS 637: Performed by: INTERNAL MEDICINE

## 2024-09-06 PROCEDURE — 99233 SBSQ HOSP IP/OBS HIGH 50: CPT | Performed by: INTERNAL MEDICINE

## 2024-09-06 PROCEDURE — 258N000001 HC RX 258: Performed by: SURGERY

## 2024-09-06 RX ORDER — GLYCOPYRROLATE 0.2 MG/ML
INJECTION, SOLUTION INTRAMUSCULAR; INTRAVENOUS PRN
Status: DISCONTINUED | OUTPATIENT
Start: 2024-09-06 | End: 2024-09-06

## 2024-09-06 RX ORDER — INDOCYANINE GREEN AND WATER 25 MG
2.5 KIT INJECTION ONCE
Status: COMPLETED | OUTPATIENT
Start: 2024-09-06 | End: 2024-09-06

## 2024-09-06 RX ORDER — HYDROMORPHONE HCL IN WATER/PF 6 MG/30 ML
0.4 PATIENT CONTROLLED ANALGESIA SYRINGE INTRAVENOUS EVERY 5 MIN PRN
Status: DISCONTINUED | OUTPATIENT
Start: 2024-09-06 | End: 2024-09-06 | Stop reason: HOSPADM

## 2024-09-06 RX ORDER — ONDANSETRON 2 MG/ML
4 INJECTION INTRAMUSCULAR; INTRAVENOUS EVERY 30 MIN PRN
Status: DISCONTINUED | OUTPATIENT
Start: 2024-09-06 | End: 2024-09-06 | Stop reason: HOSPADM

## 2024-09-06 RX ORDER — CEFAZOLIN SODIUM/WATER 2 G/20 ML
2 SYRINGE (ML) INTRAVENOUS
Status: COMPLETED | OUTPATIENT
Start: 2024-09-06 | End: 2024-09-06

## 2024-09-06 RX ORDER — SODIUM CHLORIDE, SODIUM LACTATE, POTASSIUM CHLORIDE, CALCIUM CHLORIDE 600; 310; 30; 20 MG/100ML; MG/100ML; MG/100ML; MG/100ML
INJECTION, SOLUTION INTRAVENOUS CONTINUOUS
Status: DISCONTINUED | OUTPATIENT
Start: 2024-09-06 | End: 2024-09-06 | Stop reason: HOSPADM

## 2024-09-06 RX ORDER — NALOXONE HYDROCHLORIDE 0.4 MG/ML
0.1 INJECTION, SOLUTION INTRAMUSCULAR; INTRAVENOUS; SUBCUTANEOUS
Status: DISCONTINUED | OUTPATIENT
Start: 2024-09-06 | End: 2024-09-06 | Stop reason: HOSPADM

## 2024-09-06 RX ORDER — KETOROLAC TROMETHAMINE 15 MG/ML
15 INJECTION, SOLUTION INTRAMUSCULAR; INTRAVENOUS
Status: DISCONTINUED | OUTPATIENT
Start: 2024-09-06 | End: 2024-09-06 | Stop reason: HOSPADM

## 2024-09-06 RX ORDER — HYDRALAZINE HYDROCHLORIDE 20 MG/ML
2.5-5 INJECTION INTRAMUSCULAR; INTRAVENOUS EVERY 10 MIN PRN
Status: DISCONTINUED | OUTPATIENT
Start: 2024-09-06 | End: 2024-09-06 | Stop reason: HOSPADM

## 2024-09-06 RX ORDER — FENTANYL CITRATE 50 UG/ML
INJECTION, SOLUTION INTRAMUSCULAR; INTRAVENOUS PRN
Status: DISCONTINUED | OUTPATIENT
Start: 2024-09-06 | End: 2024-09-06

## 2024-09-06 RX ORDER — SODIUM CHLORIDE, SODIUM LACTATE, POTASSIUM CHLORIDE, CALCIUM CHLORIDE 600; 310; 30; 20 MG/100ML; MG/100ML; MG/100ML; MG/100ML
INJECTION, SOLUTION INTRAVENOUS CONTINUOUS
Status: DISCONTINUED | OUTPATIENT
Start: 2024-09-06 | End: 2024-09-09 | Stop reason: HOSPADM

## 2024-09-06 RX ORDER — ONDANSETRON 2 MG/ML
INJECTION INTRAMUSCULAR; INTRAVENOUS PRN
Status: DISCONTINUED | OUTPATIENT
Start: 2024-09-06 | End: 2024-09-06

## 2024-09-06 RX ORDER — KETOROLAC TROMETHAMINE 30 MG/ML
INJECTION, SOLUTION INTRAMUSCULAR; INTRAVENOUS PRN
Status: DISCONTINUED | OUTPATIENT
Start: 2024-09-06 | End: 2024-09-06

## 2024-09-06 RX ORDER — FENTANYL CITRATE 50 UG/ML
25 INJECTION, SOLUTION INTRAMUSCULAR; INTRAVENOUS EVERY 5 MIN PRN
Status: DISCONTINUED | OUTPATIENT
Start: 2024-09-06 | End: 2024-09-06 | Stop reason: HOSPADM

## 2024-09-06 RX ORDER — ONDANSETRON 4 MG/1
4 TABLET, ORALLY DISINTEGRATING ORAL EVERY 30 MIN PRN
Status: DISCONTINUED | OUTPATIENT
Start: 2024-09-06 | End: 2024-09-06 | Stop reason: HOSPADM

## 2024-09-06 RX ORDER — ACETAMINOPHEN 325 MG/1
975 TABLET ORAL ONCE
Status: DISCONTINUED | OUTPATIENT
Start: 2024-09-06 | End: 2024-09-06 | Stop reason: HOSPADM

## 2024-09-06 RX ORDER — DEXAMETHASONE SODIUM PHOSPHATE 4 MG/ML
4 INJECTION, SOLUTION INTRA-ARTICULAR; INTRALESIONAL; INTRAMUSCULAR; INTRAVENOUS; SOFT TISSUE
Status: DISCONTINUED | OUTPATIENT
Start: 2024-09-06 | End: 2024-09-06 | Stop reason: HOSPADM

## 2024-09-06 RX ORDER — LIDOCAINE 40 MG/G
CREAM TOPICAL
Status: DISCONTINUED | OUTPATIENT
Start: 2024-09-06 | End: 2024-09-09 | Stop reason: HOSPADM

## 2024-09-06 RX ORDER — METOPROLOL TARTRATE 1 MG/ML
1-2 INJECTION, SOLUTION INTRAVENOUS EVERY 5 MIN PRN
Status: DISCONTINUED | OUTPATIENT
Start: 2024-09-06 | End: 2024-09-06 | Stop reason: HOSPADM

## 2024-09-06 RX ORDER — PROPOFOL 10 MG/ML
INJECTION, EMULSION INTRAVENOUS CONTINUOUS PRN
Status: DISCONTINUED | OUTPATIENT
Start: 2024-09-06 | End: 2024-09-06

## 2024-09-06 RX ORDER — FENTANYL CITRATE 50 UG/ML
50 INJECTION, SOLUTION INTRAMUSCULAR; INTRAVENOUS EVERY 5 MIN PRN
Status: DISCONTINUED | OUTPATIENT
Start: 2024-09-06 | End: 2024-09-06 | Stop reason: HOSPADM

## 2024-09-06 RX ORDER — PROPOFOL 10 MG/ML
INJECTION, EMULSION INTRAVENOUS PRN
Status: DISCONTINUED | OUTPATIENT
Start: 2024-09-06 | End: 2024-09-06

## 2024-09-06 RX ORDER — HYDROMORPHONE HCL IN WATER/PF 6 MG/30 ML
0.2 PATIENT CONTROLLED ANALGESIA SYRINGE INTRAVENOUS EVERY 5 MIN PRN
Status: DISCONTINUED | OUTPATIENT
Start: 2024-09-06 | End: 2024-09-06 | Stop reason: HOSPADM

## 2024-09-06 RX ORDER — KETOROLAC TROMETHAMINE 15 MG/ML
15 INJECTION, SOLUTION INTRAMUSCULAR; INTRAVENOUS EVERY 6 HOURS PRN
Status: DISCONTINUED | OUTPATIENT
Start: 2024-09-06 | End: 2024-09-09

## 2024-09-06 RX ORDER — LIDOCAINE HYDROCHLORIDE 20 MG/ML
INJECTION, SOLUTION INFILTRATION; PERINEURAL PRN
Status: DISCONTINUED | OUTPATIENT
Start: 2024-09-06 | End: 2024-09-06

## 2024-09-06 RX ORDER — DEXAMETHASONE SODIUM PHOSPHATE 4 MG/ML
INJECTION, SOLUTION INTRA-ARTICULAR; INTRALESIONAL; INTRAMUSCULAR; INTRAVENOUS; SOFT TISSUE PRN
Status: DISCONTINUED | OUTPATIENT
Start: 2024-09-06 | End: 2024-09-06

## 2024-09-06 RX ORDER — BUPIVACAINE HYDROCHLORIDE 5 MG/ML
INJECTION, SOLUTION EPIDURAL; INTRACAUDAL PRN
Status: DISCONTINUED | OUTPATIENT
Start: 2024-09-06 | End: 2024-09-06 | Stop reason: HOSPADM

## 2024-09-06 RX ORDER — CEFAZOLIN SODIUM/WATER 2 G/20 ML
2 SYRINGE (ML) INTRAVENOUS SEE ADMIN INSTRUCTIONS
Status: DISCONTINUED | OUTPATIENT
Start: 2024-09-06 | End: 2024-09-07

## 2024-09-06 RX ADMIN — KETOROLAC TROMETHAMINE 15 MG: 15 INJECTION, SOLUTION INTRAMUSCULAR; INTRAVENOUS at 13:12

## 2024-09-06 RX ADMIN — DEXAMETHASONE SODIUM PHOSPHATE 4 MG: 4 INJECTION, SOLUTION INTRA-ARTICULAR; INTRALESIONAL; INTRAMUSCULAR; INTRAVENOUS; SOFT TISSUE at 09:15

## 2024-09-06 RX ADMIN — HYDROMORPHONE HYDROCHLORIDE 0.5 MG: 1 INJECTION, SOLUTION INTRAMUSCULAR; INTRAVENOUS; SUBCUTANEOUS at 10:05

## 2024-09-06 RX ADMIN — HYDROMORPHONE HYDROCHLORIDE 0.4 MG: 0.2 INJECTION, SOLUTION INTRAMUSCULAR; INTRAVENOUS; SUBCUTANEOUS at 12:33

## 2024-09-06 RX ADMIN — PANTOPRAZOLE SODIUM 40 MG: 40 TABLET, DELAYED RELEASE ORAL at 16:38

## 2024-09-06 RX ADMIN — INSULIN DEGLUDEC 20 UNITS: 100 INJECTION, SOLUTION SUBCUTANEOUS at 13:00

## 2024-09-06 RX ADMIN — GABAPENTIN 400 MG: 300 CAPSULE ORAL at 14:33

## 2024-09-06 RX ADMIN — PROPOFOL 150 MG: 10 INJECTION, EMULSION INTRAVENOUS at 09:01

## 2024-09-06 RX ADMIN — SODIUM CHLORIDE, POTASSIUM CHLORIDE, SODIUM LACTATE AND CALCIUM CHLORIDE 500 ML: 600; 310; 30; 20 INJECTION, SOLUTION INTRAVENOUS at 07:16

## 2024-09-06 RX ADMIN — MIDAZOLAM 2 MG: 1 INJECTION INTRAMUSCULAR; INTRAVENOUS at 08:47

## 2024-09-06 RX ADMIN — CEFTRIAXONE 2 G: 2 INJECTION, POWDER, FOR SOLUTION INTRAMUSCULAR; INTRAVENOUS at 21:09

## 2024-09-06 RX ADMIN — Medication 2 G: at 08:47

## 2024-09-06 RX ADMIN — ONDANSETRON 4 MG: 2 INJECTION INTRAMUSCULAR; INTRAVENOUS at 09:18

## 2024-09-06 RX ADMIN — ROCURONIUM BROMIDE 20 MG: 50 INJECTION, SOLUTION INTRAVENOUS at 09:34

## 2024-09-06 RX ADMIN — SODIUM CHLORIDE, POTASSIUM CHLORIDE, SODIUM LACTATE AND CALCIUM CHLORIDE: 600; 310; 30; 20 INJECTION, SOLUTION INTRAVENOUS at 09:41

## 2024-09-06 RX ADMIN — SODIUM CHLORIDE, POTASSIUM CHLORIDE, SODIUM LACTATE AND CALCIUM CHLORIDE: 600; 310; 30; 20 INJECTION, SOLUTION INTRAVENOUS at 12:47

## 2024-09-06 RX ADMIN — PROPOFOL 50 MCG/KG/MIN: 10 INJECTION, EMULSION INTRAVENOUS at 09:00

## 2024-09-06 RX ADMIN — FENTANYL CITRATE 50 MCG: 50 INJECTION, SOLUTION INTRAMUSCULAR; INTRAVENOUS at 11:01

## 2024-09-06 RX ADMIN — Medication 2.5 MG: at 08:08

## 2024-09-06 RX ADMIN — FENTANYL CITRATE 100 MCG: 50 INJECTION INTRAMUSCULAR; INTRAVENOUS at 09:01

## 2024-09-06 RX ADMIN — GLYCOPYRROLATE 0.2 MG: 0.2 INJECTION, SOLUTION INTRAMUSCULAR; INTRAVENOUS at 09:01

## 2024-09-06 RX ADMIN — DEXAMETHASONE SODIUM PHOSPHATE 4 MG: 4 INJECTION, SOLUTION INTRA-ARTICULAR; INTRALESIONAL; INTRAMUSCULAR; INTRAVENOUS; SOFT TISSUE at 08:54

## 2024-09-06 RX ADMIN — LIDOCAINE HYDROCHLORIDE 50 MG: 20 INJECTION, SOLUTION INFILTRATION; PERINEURAL at 09:01

## 2024-09-06 RX ADMIN — ROCURONIUM BROMIDE 50 MG: 50 INJECTION, SOLUTION INTRAVENOUS at 09:01

## 2024-09-06 RX ADMIN — GABAPENTIN 400 MG: 300 CAPSULE ORAL at 21:07

## 2024-09-06 RX ADMIN — SODIUM CHLORIDE, POTASSIUM CHLORIDE, SODIUM LACTATE AND CALCIUM CHLORIDE: 600; 310; 30; 20 INJECTION, SOLUTION INTRAVENOUS at 08:47

## 2024-09-06 RX ADMIN — PROPRANOLOL HYDROCHLORIDE 20 MG: 20 TABLET ORAL at 21:07

## 2024-09-06 RX ADMIN — SUGAMMADEX 200 MG: 100 INJECTION, SOLUTION INTRAVENOUS at 10:00

## 2024-09-06 RX ADMIN — CALCIUM 500 MG: 500 TABLET ORAL at 21:07

## 2024-09-06 RX ADMIN — FENTANYL CITRATE 50 MCG: 50 INJECTION, SOLUTION INTRAMUSCULAR; INTRAVENOUS at 10:43

## 2024-09-06 ASSESSMENT — ACTIVITIES OF DAILY LIVING (ADL)
ADLS_ACUITY_SCORE: 24
ADLS_ACUITY_SCORE: 22
ADLS_ACUITY_SCORE: 23
ADLS_ACUITY_SCORE: 22
ADLS_ACUITY_SCORE: 24
ADLS_ACUITY_SCORE: 22
ADLS_ACUITY_SCORE: 22
ADLS_ACUITY_SCORE: 23
ADLS_ACUITY_SCORE: 22
ADLS_ACUITY_SCORE: 23
ADLS_ACUITY_SCORE: 22
ADLS_ACUITY_SCORE: 23
ADLS_ACUITY_SCORE: 22
ADLS_ACUITY_SCORE: 24
ADLS_ACUITY_SCORE: 22

## 2024-09-06 NOTE — PLAN OF CARE
"Goal Outcome Evaluation:      Plan of Care Reviewed With: patient, spouse    Outcome Evaluation: A&Ox4, able to make needs known. On [place] with 5LPM of O2 sitting around 90-92%. Has I.S. at bedside that he is using. BLL LS diminished.  Abdomen rounded, BS heard but not yet overly active. LEANDRA patent - 50ml output, serosanguinous. Tolerated some jello, will be eating a little bit more for supper.  currently refusing use of S.S. insulin even after education. Plan: IV Abx, encourage fluids, promote ambulation, Surgery following.      Problem: Adult Inpatient Plan of Care  Goal: Plan of Care Review  Description: The Plan of Care Review/Shift note should be completed every shift.  The Outcome Evaluation is a brief statement about your assessment that the patient is improving, declining, or no change.  This information will be displayed automatically on your shift  note.  Outcome: Progressing  Flowsheets (Taken 9/6/2024 1708)  Outcome Evaluation: A&Ox4, able to make needs known. On Cap[place] with 5LPM of O2 sitting around 90-92%. Has I.S. at bedside that he is using. BLL LS diminished.  Abdomen rounded, BS heard but not yet overly active. LEANDRA patent - 50ml output, serosanguinous. Tolerated some jello, will be eating a little bit more for supper.  currently refusing use of S.S. insulin even after education. Plan: IV Abx, encourage fluids, promote ambulation, Surgery following.  Plan of Care Reviewed With:   patient   spouse  Goal: Patient-Specific Goal (Individualized)  Description: You can add care plan individualizations to a care plan. Examples of Individualization might be:  \"Parent requests to be called daily at 9am for status\", \"I have a hard time hearing out of my right ear\", or \"Do not touch me to wake me up as it startles  me\".  Outcome: Progressing  Goal: Absence of Hospital-Acquired Illness or Injury  Outcome: Progressing  Intervention: Identify and Manage Fall Risk  Recent Flowsheet Documentation  Taken " 9/6/2024 1600 by Uyen Vidales, RN  Safety Promotion/Fall Prevention:   activity supervised   clutter free environment maintained   nonskid shoes/slippers when out of bed   safety round/check completed   supervised activity  Intervention: Prevent Skin Injury  Recent Flowsheet Documentation  Taken 9/6/2024 1600 by Uyen Vidales, RN  Body Position: position changed independently  Skin Protection: adhesive use limited  Device Skin Pressure Protection: tubing/devices free from skin contact  Intervention: Prevent and Manage VTE (Venous Thromboembolism) Risk  Recent Flowsheet Documentation  Taken 9/6/2024 1600 by Uyen Vidales RN  VTE Prevention/Management: SCDs on (sequential compression devices)  Intervention: Prevent Infection  Recent Flowsheet Documentation  Taken 9/6/2024 1600 by Uyen Vidales RN  Infection Prevention:   equipment surfaces disinfected   hand hygiene promoted   rest/sleep promoted   single patient room provided  Goal: Optimal Comfort and Wellbeing  Outcome: Progressing  Goal: Readiness for Transition of Care  Outcome: Progressing     Problem: Adult Inpatient Plan of Care  Goal: Plan of Care Review  Description: The Plan of Care Review/Shift note should be completed every shift.  The Outcome Evaluation is a brief statement about your assessment that the patient is improving, declining, or no change.  This information will be displayed automatically on your shift  note.  Outcome: Progressing  Flowsheets (Taken 9/6/2024 1708)  Outcome Evaluation: A&Ox4, able to make needs known. On Capno with 5LPM of O2 sitting around 90-92%. Has I.S. at bedside that he is using. BLL LS diminished.  Abdomen rounded, BS heard but not yet overly active. LEANDRA patent - 50ml output, serosanguinous. Tolerated some jello, will be eating a little bit more for supper.  currently refusing use of S.S. insulin even after education. Plan: IV Abx, encourage fluids, promote ambulation, Surgery  "following.  Plan of Care Reviewed With:   patient   spouse  Goal: Patient-Specific Goal (Individualized)  Description: You can add care plan individualizations to a care plan. Examples of Individualization might be:  \"Parent requests to be called daily at 9am for status\", \"I have a hard time hearing out of my right ear\", or \"Do not touch me to wake me up as it startles  me\".  Outcome: Progressing  Goal: Absence of Hospital-Acquired Illness or Injury  Outcome: Progressing  Intervention: Identify and Manage Fall Risk  Recent Flowsheet Documentation  Taken 9/6/2024 1600 by Uyen Vidales RN  Safety Promotion/Fall Prevention:   activity supervised   clutter free environment maintained   nonskid shoes/slippers when out of bed   safety round/check completed   supervised activity  Intervention: Prevent Skin Injury  Recent Flowsheet Documentation  Taken 9/6/2024 1600 by Uyen Vidales RN  Body Position: position changed independently  Skin Protection: adhesive use limited  Device Skin Pressure Protection: tubing/devices free from skin contact  Intervention: Prevent and Manage VTE (Venous Thromboembolism) Risk  Recent Flowsheet Documentation  Taken 9/6/2024 1600 by Uyen Vidales RN  VTE Prevention/Management: SCDs on (sequential compression devices)  Intervention: Prevent Infection  Recent Flowsheet Documentation  Taken 9/6/2024 1600 by Uyen Vidales, RN  Infection Prevention:   equipment surfaces disinfected   hand hygiene promoted   rest/sleep promoted   single patient room provided  Goal: Optimal Comfort and Wellbeing  Outcome: Progressing  Goal: Readiness for Transition of Care  Outcome: Progressing     Problem: Cholecystectomy  Goal: Absence of Bleeding  Outcome: Progressing  Intervention: Monitor and Manage Bleeding  Recent Flowsheet Documentation  Taken 9/6/2024 1600 by Uyen Vidales RN  Bleeding Management: dressing monitored  Goal: Effective Bowel Elimination  Outcome: " Progressing  Intervention: Enhance Bowel Motility and Elimination  Recent Flowsheet Documentation  Taken 9/6/2024 1600 by Uyen Vidales, RN  Bowel Motility Enhancement:   ambulation promoted   fluid intake encouraged   oral intake encouraged  Goal: Fluid and Electrolyte Balance  Outcome: Progressing  Intervention: Monitor and Manage Fluid and Electrolyte Balance  Recent Flowsheet Documentation  Taken 9/6/2024 1600 by Uyen Vidales, RN  Fluid/Electrolyte Management: fluids provided  Goal: Absence of Infection Signs and Symptoms  Outcome: Progressing  Goal: Anesthesia/Sedation Recovery  Outcome: Progressing  Intervention: Optimize Anesthesia Recovery  Recent Flowsheet Documentation  Taken 9/6/2024 1600 by Uyen Vidales, RN  Safety Promotion/Fall Prevention:   activity supervised   clutter free environment maintained   nonskid shoes/slippers when out of bed   safety round/check completed   supervised activity  Administration (IS): self-administered  Level Incentive Spirometer (mL): 1000  Incentive Spirometer Predicted Level (mL): 1500  Number of Repetitions (IS): 6  Patient Tolerance (IS): fair  Goal: Pain Control and Function  Outcome: Progressing  Goal: Nausea and Vomiting Relief  Outcome: Progressing  Goal: Effective Urinary Elimination  Outcome: Progressing  Intervention: Monitor and Manage Urinary Retention  Recent Flowsheet Documentation  Taken 9/6/2024 1600 by Uyen Vidales, RN  Urinary Elimination Promotion: frequent voiding encouraged  Goal: Effective Oxygenation and Ventilation  Outcome: Progressing  Intervention: Optimize Oxygenation and Ventilation  Recent Flowsheet Documentation  Taken 9/6/2024 1600 by Uyen Vidales RN  Head of Bed (HOB) Positioning: HOB at 30-45 degrees

## 2024-09-06 NOTE — ANESTHESIA PROCEDURE NOTES
Airway       Patient location during procedure: OR       Procedure Start/Stop Times: 9/6/2024 8:56 AM  Staff -        Anesthesiologist:  Juan Pablo Wilde MD       CRNA: Claude Tovar APRN CRNA       Performed By: CRNA  Consent for Airway        Urgency: elective  Indications and Patient Condition       Indications for airway management: shantelle-procedural and airway protection       Induction type:intravenous       Mask difficulty assessment: 1 - vent by mask    Final Airway Details       Final airway type: endotracheal airway       Successful airway: ETT - single  Endotracheal Airway Details        ETT size (mm): 8.0       Cuffed: yes       Successful intubation technique: direct laryngoscopy       DL Blade Type: MAC 3       Grade View of Cords: 1       Adjucts: stylet       Position: Center       Measured from: lips       Secured at (cm): 24       Bite block used: Soft    Post intubation assessment        Placement verified by: capnometry, equal breath sounds and chest rise        Number of attempts at approach: 1       Number of other approaches attempted: 0       Secured with: tape       Ease of procedure: easy       Dentition: Intact and Unchanged    Medication(s) Administered   Medication Administration Time: 9/6/2024 8:56 AM

## 2024-09-06 NOTE — PLAN OF CARE
"  Problem: Adult Inpatient Plan of Care  Goal: Plan of Care Review  Description: The Plan of Care Review/Shift note should be completed every shift.  The Outcome Evaluation is a brief statement about your assessment that the patient is improving, declining, or no change.  This information will be displayed automatically on your shift  note.  Outcome: Unable to Meet  Flowsheets (Taken 9/6/2024 1444)  Outcome Evaluation:   Afebrile. Maintaining sats on 5L O2. Received Dilaudid without relief from incisional pain. Received Toradol with relief from incisional pain. Abdomen rounded. Faint bowel sounds. Denies passing flatus. Dried drainage on LEANDRA site dressing   unchanged. LEANDRA drained large amount of serosaingenous drainage. Lungs diminished in bilateral bases   using IS with encouragement. Due to void. Plan: IV antibiotics. Encourage fluids as tolerated. Encourage IS and ambulation. Sliding insulin scale. Surgery consulting. Monitor and provide for needs.  Plan of Care Reviewed With: patient  Overall Patient Progress: no change  Goal: Patient-Specific Goal (Individualized)  Description: You can add care plan individualizations to a care plan. Examples of Individualization might be:  \"Parent requests to be called daily at 9am for status\", \"I have a hard time hearing out of my right ear\", or \"Do not touch me to wake me up as it startles  me\".  Outcome: Unable to Meet  Goal: Absence of Hospital-Acquired Illness or Injury  Outcome: Unable to Meet  Intervention: Identify and Manage Fall Risk  Recent Flowsheet Documentation  Taken 9/6/2024 1209 by Zhanna Tejada, RN  Safety Promotion/Fall Prevention:   activity supervised   clutter free environment maintained   nonskid shoes/slippers when out of bed   safety round/check completed   supervised activity  Intervention: Prevent Skin Injury  Recent Flowsheet Documentation  Taken 9/6/2024 1209 by Zhanna Tejada, RN  Body Position: supine, head elevated  Skin Protection: " adhesive use limited  Device Skin Pressure Protection: tubing/devices free from skin contact  Intervention: Prevent and Manage VTE (Venous Thromboembolism) Risk  Recent Flowsheet Documentation  Taken 9/6/2024 1209 by Zhanna Tejada RN  VTE Prevention/Management: SCDs on (sequential compression devices)  Intervention: Prevent Infection  Recent Flowsheet Documentation  Taken 9/6/2024 1209 by Zhanna Tejada RN  Infection Prevention:   hand hygiene promoted   rest/sleep promoted   single patient room provided  Goal: Optimal Comfort and Wellbeing  Outcome: Unable to Meet  Intervention: Monitor Pain and Promote Comfort  Recent Flowsheet Documentation  Taken 9/6/2024 1233 by Zhanna Tejada RN  Pain Management Interventions: medication (see MAR)  Goal: Readiness for Transition of Care  Outcome: Unable to Meet     Problem: Cholecystectomy  Goal: Absence of Bleeding  Outcome: Unable to Meet  Intervention: Monitor and Manage Bleeding  Recent Flowsheet Documentation  Taken 9/6/2024 1209 by Zhanna Tejada RN  Bleeding Management: dressing monitored  Goal: Effective Bowel Elimination  Outcome: Unable to Meet  Intervention: Enhance Bowel Motility and Elimination  Recent Flowsheet Documentation  Taken 9/6/2024 1209 by Zhanna Tejada RN  Bowel Motility Enhancement:   ambulation promoted   fluid intake encouraged   oral intake encouraged  Goal: Fluid and Electrolyte Balance  Outcome: Unable to Meet  Intervention: Monitor and Manage Fluid and Electrolyte Balance  Recent Flowsheet Documentation  Taken 9/6/2024 1209 by Zhanna Tejada RN  Fluid/Electrolyte Management: fluids provided  Goal: Absence of Infection Signs and Symptoms  Outcome: Unable to Meet  Goal: Anesthesia/Sedation Recovery  Outcome: Unable to Meet  Intervention: Optimize Anesthesia Recovery  Recent Flowsheet Documentation  Taken 9/6/2024 1209 by Zhanna Tejada RN  Safety Promotion/Fall Prevention:   activity supervised   umesh  free environment maintained   nonskid shoes/slippers when out of bed   safety round/check completed   supervised activity  Goal: Pain Control and Function  Outcome: Unable to Meet  Intervention: Prevent or Manage Pain  Recent Flowsheet Documentation  Taken 9/6/2024 1233 by Zhanna Tejada RN  Pain Management Interventions: medication (see MAR)  Goal: Nausea and Vomiting Relief  Outcome: Unable to Meet  Goal: Effective Urinary Elimination  Outcome: Unable to Meet  Intervention: Monitor and Manage Urinary Retention  Recent Flowsheet Documentation  Taken 9/6/2024 1209 by Zhanna Tejada RN  Urinary Elimination Promotion: frequent voiding encouraged  Goal: Effective Oxygenation and Ventilation  Outcome: Unable to Meet     Problem: Pain Acute  Goal: Optimal Pain Control and Function  Outcome: Unable to Meet  Intervention: Develop Pain Management Plan  Recent Flowsheet Documentation  Taken 9/6/2024 1233 by Zhanna Tejada RN  Pain Management Interventions: medication (see MAR)  Intervention: Prevent or Manage Pain  Recent Flowsheet Documentation  Taken 9/6/2024 1209 by Zhanna Tejada RN  Sensory Stimulation Regulation: care clustered  Sleep/Rest Enhancement: room darkened  Bowel Elimination Promotion:   adequate fluid intake promoted   ambulation promoted   diet adjusted   privacy promoted  Medication Review/Management: medications reviewed  Intervention: Optimize Psychosocial Wellbeing  Recent Flowsheet Documentation  Taken 9/6/2024 1209 by Zhanna Tejada RN  Supportive Measures:   active listening utilized   decision-making supported   goal-setting facilitated   positive reinforcement provided   problem-solving facilitated   self-care encouraged   self-responsibility promoted   verbalization of feelings encouraged   Goal Outcome Evaluation:      Plan of Care Reviewed With: patient    Overall Patient Progress: no changeOverall Patient Progress: no change    Outcome Evaluation: Afebrile.  Maintaining sats on 5L O2. Received Dilaudid without relief from incisional pain. Received Toradol with relief from incisional pain. Abdomen rounded. Faint bowel sounds. Denies passing flatus. Dried drainage on LEANDRA site dressing; unchanged. LEANDRA drained large amount of serosaingenous drainage. Lungs diminished in bilateral bases; using IS with encouragement. Due to void. Plan: IV antibiotics. Encourage fluids as tolerated. Encourage IS and ambulation. Sliding insulin scale. Surgery consulting. Monitor and provide for needs.

## 2024-09-06 NOTE — PROGRESS NOTES
Cross cover note    LR infusion at 100 mL/h for 10 hours started as patient is n.p.o., awaiting for cholecystectomy.    Artur Durant MD  Internal Medicine Hospitalist

## 2024-09-06 NOTE — OP NOTE
[facility] General Surgery Operative Note    Pre-operative diagnosis: acute cholecystitis   Post-operative diagnosis: acute gangrenous cholecystitis   Procedure: laparoscopic cholecystectomy   Surgeon: Aakash Meraz MD   Assistant(s): Ashtyn Oneill PA-C  The Physician Assistant was medically necessary for their expertise in prepping, camera management, suctioning, suturing and retraction.   Anesthesia: general   Estimated blood loss:  Specimen: 50 cc  gallbladder and contents               DESCRIPTION OF PROCEDURE:  The patient was taken to the operating room and placed on the table in supine position.  General endotracheal anesthesia was induced and the abdomen was prepped and draped in standard sterile fashion.  An incision above the umbilicus was made with a blade.  The incision was carried down to the fascia.  The fascia was incised in the midline with a blade.  The peritoneum was entered bluntly with a Carmalt clamp.  Two interrupted 0 Vicryl sutures were placed at the extremes of this fascial incision.  The Doe trocar was introduced and the abdomen was insufflated with CO2.  A 5 mm trocar was placed in the subxiphoid position.  A 5 mm trocar was placed in the right upper quadrant, just below the costal margin at the midclavicular line.  Another was placed at the anterior axillary line just below the costal margin on the right.  The patient was placed in reverse Trendelenburg and right side up.  The gallbladder appeared encased in omentum and was otherwise tensely distended with patches of gangrene. 100mL of bile was aspirated to decompress the gallbladder..  The fundus of the gallbladder was grasped and retracted cephalad.  The infundibulum was grasped and retracted laterally.  The peritoneum over the medial and lateral aspects of the triangle of Calot was taken down with the [place] dissector and modest amounts of Bovie electrocautery.  The cystic duct and artery were freed up from  surrounding tissues.  The triangle of Calot was skeletonized revealing the critical view of safety.  This revealed an additional posterior branch which was clipped separately.  The cystic artery and duct were each clipped twice proximally, once distally and transected with the hook scissors.  The gallbladder was then removed from the liver using the hook electrocautery.  The gallbladder was passed into an Endocatch bag and removed through the umbilical trocar site.  We observed the right upper quadrant carefully for hemostasis.  Hemostasis was assured.  We used Surgicel powder to coat the liver bed as well and placed a 15Fr channel drain into the surgical site from our lateral port site and anchored the drain with a 2-0 Nylon. We irrigated with copious amounts of sterile saline and aspirated the effluent.  The right upper quadrant trocar sites were anesthetized with local anesthetic.  Each of the trocars was removed under direct visualization.  There was no bleeding from any of these sites.  The Doe trocar was removed and the abdomen was evacuated of CO2. An additional interrupted 0 Vicryl suture was placed in the umbilical trocar site fascia.  Each of the three 0 Vicryl sutures was cinched down and tied.  The skin of the umbilical incision was anesthetized with local anesthetic.  All of the incisions were closed with interrupted 4-0 Vicryl subcuticular sutures and Dermabond.  The patient tolerated the procedure well.  Sponge and instrument counts were correct.    Aakash Meraz MD

## 2024-09-06 NOTE — ANESTHESIA POSTPROCEDURE EVALUATION
Patient: Crispin Rojas    Procedure: Procedure(s):  LAPAROSCOPIC CHOLECYSTECTOMY       Anesthesia Type:  General    Note:  Disposition: Outpatient   Postop Pain Control: Uneventful            Sign Out: Well controlled pain   PONV: No   Neuro/Psych: Uneventful            Sign Out: Acceptable/Baseline neuro status   Airway/Respiratory: Uneventful            Sign Out: Acceptable/Baseline resp. status   CV/Hemodynamics: Uneventful            Sign Out: Acceptable CV status; No obvious hypovolemia; No obvious fluid overload   Other NRE: NONE   DID A NON-ROUTINE EVENT OCCUR? No           Last vitals:  Vitals Value Taken Time   /58 09/06/24 1130   Temp 99.4  F (37.4  C) 09/06/24 1015   Pulse 72 09/06/24 1138   Resp 19 09/06/24 1138   SpO2 91 % 09/06/24 1138   Vitals shown include unfiled device data.    Electronically Signed By: Juan Pablo Wilde MD  September 6, 2024  12:58 PM

## 2024-09-06 NOTE — ANESTHESIA CARE TRANSFER NOTE
Patient: Crispin Rojas    Procedure: Procedure(s):  LAPAROSCOPIC CHOLECYSTECTOMY       Diagnosis: Cholecystitis [K81.9]  Diagnosis Additional Information: No value filed.    Anesthesia Type:   General     Note:    Oropharynx: oropharynx clear of all foreign objects  Level of Consciousness: awake and drowsy  Oxygen Supplementation: face mask  Level of Supplemental Oxygen (L/min / FiO2): 8l  Independent Airway: airway patency satisfactory and stable  Dentition: dentition unchanged  Vital Signs Stable: post-procedure vital signs reviewed and stable  Report to RN Given: handoff report given  Patient transferred to: PACU  Comments: Pt to PACU, VSS, report to RN  Handoff Report: Identifed the Patient, Identified the Reponsible Provider, Reviewed the pertinent medical history, Discussed the surgical course, Reviewed Intra-OP anesthesia mangement and issues during anesthesia, Set expectations for post-procedure period and Allowed opportunity for questions and acknowledgement of understanding      Vitals:  Vitals Value Taken Time   /62 09/06/24 1025   Temp 99.4  F (37.4  C) 09/06/24 1015   Pulse 80 09/06/24 1027   Resp 23 09/06/24 1027   SpO2 92 % 09/06/24 1027   Vitals shown include unfiled device data.    Electronically Signed By: SARAH Bruner CRNA  September 6, 2024  10:28 AM

## 2024-09-06 NOTE — PLAN OF CARE
"Goal Outcome Evaluation:      Plan of Care Reviewed With: patient    Overall Patient Progress: no changeOverall Patient Progress: no change    Orientation: Alert and oriented x4  VSS. Tmax. 101.8. Tylenol x1 given.    LS: Clear   GI: RUQ tenderness present. Passing gas. No BM. Denies N/V. NPO since midnight.  : Adequate urine output.   Skin: Healig dog bite to R. Hand, orthotic in place   Lines: LR infusing @ 100 ml/hr C/D/I.   Activity: Independent. . Pt slept comfortably throughout shift.   Pain: Pt rating abdominal pain 2-3/10. Declined prn medications.   Updates/Plan: Lap Janna this AM. Continue with current cares.    Problem: Adult Inpatient Plan of Care  Goal: Plan of Care Review  Description: The Plan of Care Review/Shift note should be completed every shift.  The Outcome Evaluation is a brief statement about your assessment that the patient is improving, declining, or no change.  This information will be displayed automatically on your shift  note.  Outcome: Progressing  Flowsheets (Taken 9/6/2024 0624)  Plan of Care Reviewed With: patient  Overall Patient Progress: no change  Goal: Patient-Specific Goal (Individualized)  Description: You can add care plan individualizations to a care plan. Examples of Individualization might be:  \"Parent requests to be called daily at 9am for status\", \"I have a hard time hearing out of my right ear\", or \"Do not touch me to wake me up as it startles  me\".  Outcome: Progressing  Goal: Absence of Hospital-Acquired Illness or Injury  Outcome: Progressing  Intervention: Identify and Manage Fall Risk  Recent Flowsheet Documentation  Taken 9/5/2024 2130 by Capo Rodgers RN  Safety Promotion/Fall Prevention:   assistive device/personal items within reach   clutter free environment maintained   lighting adjusted   nonskid shoes/slippers when out of bed   patient and family education   room near nurse's station   room organization consistent   safety round/check " completed  Intervention: Prevent Skin Injury  Recent Flowsheet Documentation  Taken 9/5/2024 2130 by Capo Rodgers RN  Body Position: position changed independently  Skin Protection: adhesive use limited  Device Skin Pressure Protection:   adhesive use limited   tubing/devices free from skin contact  Intervention: Prevent and Manage VTE (Venous Thromboembolism) Risk  Recent Flowsheet Documentation  Taken 9/5/2024 2130 by Capo Rodgers RN  VTE Prevention/Management: SCDs off (sequential compression devices)  Intervention: Prevent Infection  Recent Flowsheet Documentation  Taken 9/5/2024 2130 by Capo Rodgers RN  Infection Prevention:   equipment surfaces disinfected   hand hygiene promoted   rest/sleep promoted   single patient room provided  Goal: Optimal Comfort and Wellbeing  Outcome: Progressing  Goal: Readiness for Transition of Care  Outcome: Progressing

## 2024-09-06 NOTE — PROGRESS NOTES
[facility]    Medicine Progress Note - Hospitalist Service    Date of Admission:  9/4/2024    Assessment & Plan     Crispin Rojas is a 64 year old male with Pmhx of DVT on apixaban, CVA, essential hypertension, hypercholesteremia, T2DM, peripheral vascular disease, obstructive sleep apnea, SNYDER Cirrhosis, who was admitted on 9/4/2024 with acute cholecystitis.     In the ED temp 99.5F, VSS. Lab work with neutrophilic leukocytosis, CMP with borderline hyponatremia, total bilirubin of 1.6 with normal AST, ALT, alk phos.  No lactic acidosis.  Upper quadrant ultrasound with cholelithiasis, gallbladder wall thickening concerning for acute versus chronic cholecystitis.  No biliary dilation, common duct measuring 4 mm.  Given Rocephin in the ED.  Admission was requested from hospitalist service for further cares and monitoring.  General surgery was contacted from the emergency department who did not recommend cholecystectomy from the emergency department due to anticoagulation.  DOAC has been held since admit.    Acute gangrenous cholecystitis.  Sepsis due to cholecystitis.  -Continue IV ceftriaxone.  -Status postcholecystectomy on 9/6 by general surgery.  -Pain medications as needed.  -Use incentive spirometry.    Acute hypoxic respiratory failure.  -Supplemental oxygen as needed.  -Check chest x-ray.    Diabetes mellitus type 2.  Chronic diabetic left foot ulcer.  Diabetic neuropathy.  -Now restarted on diet per surgery.  -Restart Tresiba insulin 20 units a day today.  -Restart scheduled aspart insulin on a carb counting basis with meals.  -Additional aspart insulin sliding scale as needed.  -Continue gabapentin for him milligrams 3 times a day.  -Hold metformin.    Hypertension.  -Blood pressure low this morning.  -Hold hydrochlorothiazide, lisinopril, amlodipine for now.  -Continue propranolol 20 mg twice a day.    GERD.  -Continue pantoprazole 40 mg twice a day.    History of DVT.  -Apixaban  "currently on hold due to need for above surgery.  -Restart apixaban when okay with surgery.    Hyperlipidemia.  -Hold atorvastatin today.  -Likely restart tomorrow.    Hyponatremia.  -Mild.  Sodium 134.  -Continue IV fluids.  -Recheck metabolic panel tomorrow.           Observation Goals: -diagnostic tests and consults completed and resulted, -vital signs normal or at patient baseline, -adequate pain control on oral analgesics, -tolerating oral antibiotics or has plans for home infusion setup, Nurse to notify provider when observation goals have been met and patient is ready for discharge.  Diet: High Consistent Carb (75 g CHO per Meal) Diet    DVT Prophylaxis: DOAC  Mccarthy Catheter: Not present  Lines: None     Cardiac Monitoring: None  Code Status: Full Code      Clinically Significant Risk Factors Present on Admission          # Hypocalcemia: Lowest Ca = 8 mg/dL in last 2 days, will monitor and replace as appropriate     # Hypoalbuminemia: Lowest albumin = 3.2 g/dL at 9/5/2024  7:40 AM, will monitor as appropriate  # Drug Induced Coagulation Defect: home medication list includes an anticoagulant medication    # Hypertension: Noted on problem list        # DMII: A1C = 6.8 % (Ref range: 0.0 - 5.6 %) within past 6 months    # Obesity: Estimated body mass index is 33.08 kg/m  as calculated from the following:    Height as of this encounter: 1.803 m (5' 11\").    Weight as of this encounter: 107.6 kg (237 lb 3.4 oz).                    Disposition Plan     Medically Ready for Discharge: Anticipated Tomorrow             Gordon Lerner DO  Hospitalist Service  [facility]  Securely message with Ryan (more info)  Text page via [facility] Paging/Directory   ______________________________________________________________________    Interval History   Abdominal pain improved this morning.  Some nausea.  Short of breath at times.  Felt feverish overnight.  Denies chest pain.    Physical Exam   Vital " Signs: Temp: 98.2  F (36.8  C) Temp src: Oral BP: 127/72 Pulse: 76   Resp: 24 SpO2: 91 % O2 Device: Nasal cannula Oxygen Delivery: 5 LPM  Weight: 237 lbs 3.44 oz    Gen:  NAD, A&Ox3.  Eyes:  PERRL, sclera anicteric.  OP:  MMM, no lesions.  Neck:  Supple.  CV:  Regular, no murmurs.  Lung:  CTA b/l, normal effort.  Ab:  +BS, soft.  Skin:  Warm, dry to touch.  No rash.  Ext:  No pitting edema LE b/l.      Medical Decision Making       53 MINUTES SPENT BY ME on the date of service doing chart review, history, exam, documentation & further activities per the note.      Data         Imaging results reviewed over the past 24 hrs:   No results found for this or any previous visit (from the past 24 hour(s)).

## 2024-09-06 NOTE — ANESTHESIA PREPROCEDURE EVALUATION
Anesthesia Pre-Procedure Evaluation    Patient: Crispin Rojas   MRN: [id number] : 1960        Procedure : Procedure(s):  LAPAROSCOPIC CHOLECYSTECTOMY          Past Medical History:   Diagnosis Date    Antiplatelet or antithrombotic long-term use     Ascending aorta dilatation (H24)     Cerebral artery occlusion with cerebral infarction (H)     Cerebral infarction (H)         Gastroesophageal reflux disease with esophagitis     H/O blood clots     Hyperlipidemia LDL goal <70     Hypertension     Nonalcoholic steatohepatitis 2022    Numbness and tingling     Obesity     GILA (obstructive sleep apnea) 10/22/2018    CPAP intolerant    PVD (peripheral vascular disease) (H24)     s/p left partial toe amputation    Renal stone     Tremor     Type 2 diabetes mellitus with diabetic polyneuropathy, with long-term current use of insulin (H)       Past Surgical History:   Procedure Laterality Date    AMPUTATE FOOT Left 2016    Procedure: AMPUTATE FOOT;  Surgeon: Jack Younger DPM;  Location: [facility]    AMPUTATE TOE(S) Right 2016    Procedure: AMPUTATE TOE(S);  Surgeon: Rachelle Manriquez DPM, Pod;  Location: [facility]    AMPUTATE TOE(S) Right 2019    Procedure: Right fourth toe partial amputation for treatment of osteomyelitis.;  Surgeon: Jack Younger DPM;  Location: [facility]    AMPUTATE TOE(S) Left 2019    Procedure: Left second toe amputation at the metatarsophalangeal joint.;  Surgeon: Rachelle Manriquez DPM, Podiatry/Foot and Ankle Surgery;  Location: [facility]    AMPUTATE TOE(S) Right 2022    Procedure: AMPUTATION OF RIGHT GREAT TOE;  Surgeon: Wili Quiles DPM;  Location: [facility]    AMPUTATE TOE(S) Right 2022    Procedure: Right foot partial first metatarsal resection;  Surgeon: Tiny Soto DPM;  Location: [facility]    AMPUTATE TOE(S) Left 2024    Procedure: Left foot transmetatarsal amputation;  Surgeon: Tiny Soto DPM;  Location: [facility]    ANGIOGRAM       BIOPSY BONE FOOT Right 7/24/2022    Procedure: Bone biopsy, right first metatarsal.;  Surgeon: Valentino Molina DPM;  Location: [facility]    COLONOSCOPY      COMBINED CYSTOSCOPY, RETROGRADES, URETEROSCOPY, INSERT STENT Left 8/21/2016    Procedure: COMBINED CYSTOSCOPY, RETROGRADES, URETEROSCOPY, INSERT STENT;  Surgeon: Artemio Valenzuela MD;  Location: [facility]    COMBINED CYSTOSCOPY, RETROGRADES, URETEROSCOPY, LASER HOLMIUM LITHOTRIPSY URETER(S), INSERT STENT Left 10/3/2016    Procedure: COMBINED CYSTOSCOPY, RETROGRADES, URETEROSCOPY, LASER HOLMIUM LITHOTRIPSY URETER(S), INSERT STENT;  Surgeon: Artemio Valenzuela MD;  [facility]: [facility]    EXTRACORPOREAL SHOCK WAVE LITHOTRIPSY (ESWL) Left 9/8/2016    Procedure: EXTRACORPOREAL SHOCK WAVE LITHOTRIPSY (ESWL);  Surgeon: Artemio Valenzuela MD;  Location: [facility]    INCISION AND DRAINAGE FOOT, COMBINED Right 7/24/2022    Procedure: Incision and drainage, right foot ;  Surgeon: Valentino Molina DPM;  [facility]: [facility]    IRRIGATION AND DEBRIDEMENT FOOT, COMBINED Left 10/19/2023    Procedure: Left foot second metatarsal resection , . Left plantar ulcer excisional debridement, down to and including tendon, with closure, site measuring 4 cm x 2 cm x 1 cm;  Surgeon: Tiny Soto DPM;  [facility]: [facility]    IRRIGATION AND DEBRIDEMENT HAND, COMBINED Right 7/30/2024    Procedure: Irrigation and Debridement of Right Dorsal Hand and wrist;  Surgeon: June Mcdaniels MD;  Location: [facility]    OSTEOTOMY FOOT Right 11/30/2022    Procedure: 1. Right second metatarsal floating osteotomy  2. Right second digit proximal phalanx arthroplasty 3. Right percutaneous flexor tenotomy;  Surgeon: Tiny Soto DPM;  [facility]: [facility]    OSTEOTOMY FOOT Right 1/19/2023    Procedure: Right foot third metatarsal head excision, ulcer debridement, matatarsal osteotomies;  Surgeon: Tiny Soto DPM;  Location: [facility]    RECESSION GASTROCNEMIUS Right 2/1/2016     Procedure: RECESSION GASTROCNEMIUS;  Surgeon: Rachelle Manriquez DPM, Pod;  [facility]: [facility]      Allergies   Allergen Reactions    Bactrim [Sulfamethoxazole-Trimethoprim] Nausea and Vomiting    Statins Swelling     Other reaction(s): Other (see comments)  SIMCOR caused edema in extremities  Only Simvastatin    Sulfatolamide Nausea and Vomiting    Niacin Swelling     SIMCOR caused edema in extremities    Simvastatin Swelling     SIMCOR caused edema in extremities    Sulfa Antibiotics Nausea      Social History     Tobacco Use    Smoking status: Never    Smokeless tobacco: Never   Substance Use Topics    Alcohol use: Yes     Comment: rare---red wine 3x per month      Wt Readings from Last 1 Encounters:   09/04/24 107.6 kg (237 lb 3.4 oz)        Anesthesia Evaluation   Pt has had prior anesthetic. Type: General.    History of anesthetic complications       ROS/MED HX  ENT/Pulmonary:     (+) sleep apnea, moderate, doesn't use CPAP,                                      Neurologic:     (+)          CVA,    TIA,                  Cardiovascular:     (+)  hypertension- -   -  - -   Taking blood thinners                                   METS/Exercise Tolerance:     Hematologic: Comments: Lab Test        09/05/24 09/04/24 07/29/24 11/08/19 11/07/19                       0740          1243          0652          0816          1817          WBC          11.1*        15.5*        7.6            < >        5.4           HGB          11.3*        12.8*        10.5*          < >        11.5*         MCV          88           87           88             < >        90            PLT          156          192          120*           < >        168           INR           --           --           --           --          1.08           < > = values in this interval not displayed.                  Lab Test        09/06/24 09/06/24 09/05/24 09/05/24 09/05/24 09/04/24 09/04/24 08/01/24      08/01/24                       0808          0159          2203          0905          0740          1759          1243          0811          0648          NA            --           --           --           --          134*          --          134*          --          141           POTASSIUM     --           --           --           --          3.8           --          3.7           --          4.0           CHLORIDE      --           --           --           --          99            --          99            --          106           CO2           --           --           --           --          20*           --          22            --          21*           BUN           --           --           --           --          13.9          --          16.1          --          21.6          CR            --           --           --           --          1.11          --          1.08          --          1.00          ANIONGAP      --           --           --           --          15            --          13            --          14            NARA           --           --           --           --          8.0*          --          8.7*          --          8.5*          GLC          204*         193*         232*           < >        164*           < >        164*           < >        126*  126*    < > = values in this interval not displayed.                     (+)      anemia,          Musculoskeletal:  - neg musculoskeletal ROS     GI/Hepatic:     (+)          cholecystitis/cholelithiasis, hepatitis type Alcoholic, liver disease,       Renal/Genitourinary:     (+) renal disease,      Nephrolithiasis ,       Endo:     (+) type I DM,    Not using insulin,    Diabetic complications: neuropathy.      Obesity,       Psychiatric/Substance Use:  - neg psychiatric ROS     Infectious Disease:  - neg infectious disease ROS     Malignancy:       Other:  - neg other ROS          Physical Exam    Airway         Mallampati: II   TM distance: > 3 FB   Neck ROM: full   Mouth opening: > 3 cm    Respiratory Devices and Support         Dental       (+) Minor Abnormalities - some fillings, tiny chips      Cardiovascular   cardiovascular exam normal          Pulmonary   pulmonary exam normal                OUTSIDE LABS:  CBC:   Lab Results   Component Value Date    WBC 11.1 (H) 09/05/2024    WBC 15.5 (H) 09/04/2024    HGB 11.3 (L) 09/05/2024    HGB 12.8 (L) 09/04/2024    HCT 35.0 (L) 09/05/2024    HCT 38.9 (L) 09/04/2024     09/05/2024     09/04/2024     BMP:   Lab Results   Component Value Date     (L) 09/05/2024     (L) 09/04/2024    POTASSIUM 3.8 09/05/2024    POTASSIUM 3.7 09/04/2024    CHLORIDE 99 09/05/2024    CHLORIDE 99 09/04/2024    CO2 20 (L) 09/05/2024    CO2 22 09/04/2024    BUN 13.9 09/05/2024    BUN 16.1 09/04/2024    CR 1.11 09/05/2024    CR 1.08 09/04/2024     (H) 09/06/2024     (H) 09/06/2024     COAGS:   Lab Results   Component Value Date    PTT 39 (H) 07/28/2022    INR 1.08 11/07/2019     POC:   Lab Results   Component Value Date     (H) 05/10/2021     HEPATIC:   Lab Results   Component Value Date    ALBUMIN 3.2 (L) 09/05/2024    PROTTOTAL 6.7 09/05/2024    ALT 15 09/05/2024    AST 20 09/05/2024    ALKPHOS 53 09/05/2024    BILITOTAL 1.3 (H) 09/05/2024     OTHER:   Lab Results   Component Value Date    LACT 1.5 09/04/2024    A1C 6.8 (H) 07/05/2024    NARA 8.0 (L) 09/05/2024    MAG 2.1 08/02/2019    LIPASE 28 09/04/2024    TSH 1.18 07/23/2022    CRP 70.9 (H) 07/22/2022    SED 24 (H) 07/28/2024       Anesthesia Plan    ASA Status:  3    NPO Status:  NPO Appropriate    Anesthesia Type: General.     - Airway: ETT   Induction: Intravenous, Propofol.   Maintenance: Balanced.        Consents    Anesthesia Plan(s) and associated risks, benefits, and realistic alternatives discussed. Questions answered and patient/representative(s) expressed understanding.     - Discussed:    "  - Discussed with:  Patient      - Extended Intubation/Ventilatory Support Discussed: No.      - Patient is DNR/DNI Status: No     Use of blood products discussed: No .     Postoperative Care    Pain management: IV analgesics.   PONV prophylaxis: Dexamethasone or Solumedrol, Ondansetron (or other 5HT-3)     Comments:               Juan Pablo Wilde MD    I have reviewed the pertinent notes and labs in the chart from the past 30 days and (re)examined the patient.  Any updates or changes from those notes are reflected in this note.     # Hyponatremia: Lowest Na = 134 mmol/L in last 30 days, will monitor as appropriate   # Hypocalcemia: Lowest Ca = 8 mg/dL in last 2 days, will monitor and replace as appropriate    # Hypoalbuminemia: Lowest albumin = 3.2 g/dL at 9/5/2024  7:40 AM, will monitor as appropriate   # Drug Induced Coagulation Defect: home medication list includes an anticoagulant medication   # DMII: A1C = 6.8 % (Ref range: 0.0 - 5.6 %) within past 6 months  # Obesity: Estimated body mass index is 33.08 kg/m  as calculated from the following:    Height as of this encounter: 1.803 m (5' 11\").    Weight as of this encounter: 107.6 kg (237 lb 3.4 oz).      "

## 2024-09-06 NOTE — PLAN OF CARE
"Goal Outcome Evaluation:      Plan of Care Reviewed With: patient    Overall Patient Progress: no changeOverall Patient Progress: no change    [id number]    Vital Signs: A&Ox4. VSS on RA. Tmax 99.8. Pt sleeping comfortable this evening  Pain/Comfort: denies pain  Assessment: LS clear bilaterally. Abdominal discomfort. Denies N/V. Healing dog bite on R hand, wearing orthotic. , patient is refusing insulin.   Diet: tolerating low fat diet.  Output: no output this evening.  Activity/Ambulation: ambulating in room independently  Plan: Continue with abx. NPO @ 0000 for lap rosalva tomorrow.         Problem: Adult Inpatient Plan of Care  Goal: Plan of Care Review  Description: The Plan of Care Review/Shift note should be completed every shift.  The Outcome Evaluation is a brief statement about your assessment that the patient is improving, declining, or no change.  This information will be displayed automatically on your shift  note.  Outcome: Progressing  Flowsheets (Taken 9/5/2024 1900)  Plan of Care Reviewed With: patient  Overall Patient Progress: no change  Goal: Patient-Specific Goal (Individualized)  Description: You can add care plan individualizations to a care plan. Examples of Individualization might be:  \"Parent requests to be called daily at 9am for status\", \"I have a hard time hearing out of my right ear\", or \"Do not touch me to wake me up as it startles  me\".  Outcome: Progressing  Goal: Absence of Hospital-Acquired Illness or Injury  Outcome: Progressing  Intervention: Identify and Manage Fall Risk  Recent Flowsheet Documentation  Taken 9/5/2024 1725 by Riddhi Martinez RN  Safety Promotion/Fall Prevention:   assistive device/personal items within reach   clutter free environment maintained   nonskid shoes/slippers when out of bed   room near nurse's station  Intervention: Prevent Skin Injury  Recent Flowsheet Documentation  Taken 9/5/2024 1725 by Riddhi Martinez RN  Body Position: position changed " independently  Skin Protection: adhesive use limited  Device Skin Pressure Protection: adhesive use limited  Intervention: Prevent and Manage VTE (Venous Thromboembolism) Risk  Recent Flowsheet Documentation  Taken 9/5/2024 1725 by Riddhi Martinez RN  VTE Prevention/Management: SCDs off (sequential compression devices)  Intervention: Prevent Infection  Recent Flowsheet Documentation  Taken 9/5/2024 1725 by Riddhi Martinez RN  Infection Prevention:   rest/sleep promoted   hand hygiene promoted   equipment surfaces disinfected  Goal: Optimal Comfort and Wellbeing  Outcome: Progressing  Goal: Readiness for Transition of Care  Outcome: Progressing     Problem: Cholecystectomy  Goal: Absence of Bleeding  Outcome: Progressing  Goal: Effective Bowel Elimination  Outcome: Progressing  Goal: Fluid and Electrolyte Balance  Outcome: Progressing  Goal: Absence of Infection Signs and Symptoms  Outcome: Progressing  Goal: Anesthesia/Sedation Recovery  Outcome: Progressing  Intervention: Optimize Anesthesia Recovery  Recent Flowsheet Documentation  Taken 9/5/2024 1725 by Riddhi Martinez RN  Safety Promotion/Fall Prevention:   assistive device/personal items within reach   clutter free environment maintained   nonskid shoes/slippers when out of bed   room near nurse's station  Goal: Pain Control and Function  Outcome: Progressing  Goal: Nausea and Vomiting Relief  Outcome: Progressing  Goal: Effective Urinary Elimination  Outcome: Progressing  Intervention: Monitor and Manage Urinary Retention  Recent Flowsheet Documentation  Taken 9/5/2024 1725 by Riddhi Martinez RN  Urinary Elimination Promotion: frequent voiding encouraged  Goal: Effective Oxygenation and Ventilation  Outcome: Progressing  Intervention: Optimize Oxygenation and Ventilation  Recent Flowsheet Documentation  Taken 9/5/2024 1725 by Riddhi Martinez RN  Head of Bed (HOB) Positioning: HOB at 15 degrees     Problem: Pain Acute  Goal: Optimal Pain Control  and Function  Outcome: Progressing  Intervention: Prevent or Manage Pain  Recent Flowsheet Documentation  Taken 9/5/2024 1725 by Riddhi Martinez RN  Bowel Elimination Promotion:   adequate fluid intake promoted   ambulation promoted  Medication Review/Management:   medications reviewed   high-risk medications identified

## 2024-09-07 LAB
ALBUMIN SERPL BCG-MCNC: 3.1 G/DL (ref 3.5–5.2)
ALP SERPL-CCNC: 64 U/L (ref 40–150)
ALT SERPL W P-5'-P-CCNC: 28 U/L (ref 0–70)
ANION GAP SERPL CALCULATED.3IONS-SCNC: 12 MMOL/L (ref 7–15)
AST SERPL W P-5'-P-CCNC: ABNORMAL U/L
BILIRUB SERPL-MCNC: 0.4 MG/DL
BUN SERPL-MCNC: 30.7 MG/DL (ref 8–23)
CALCIUM SERPL-MCNC: 8.4 MG/DL (ref 8.8–10.4)
CHLORIDE SERPL-SCNC: 101 MMOL/L (ref 98–107)
CREAT SERPL-MCNC: 1.09 MG/DL (ref 0.67–1.17)
EGFRCR SERPLBLD CKD-EPI 2021: 77 ML/MIN/1.73M2
ERYTHROCYTE [DISTWIDTH] IN BLOOD BY AUTOMATED COUNT: 14 % (ref 10–15)
GLUCOSE BLDC GLUCOMTR-MCNC: 268 MG/DL (ref 70–99)
GLUCOSE BLDC GLUCOMTR-MCNC: 298 MG/DL (ref 70–99)
GLUCOSE BLDC GLUCOMTR-MCNC: 307 MG/DL (ref 70–99)
GLUCOSE BLDC GLUCOMTR-MCNC: 315 MG/DL (ref 70–99)
GLUCOSE BLDC GLUCOMTR-MCNC: 364 MG/DL (ref 70–99)
GLUCOSE SERPL-MCNC: 313 MG/DL (ref 70–99)
HCO3 SERPL-SCNC: 23 MMOL/L (ref 22–29)
HCT VFR BLD AUTO: 35.4 % (ref 40–53)
HGB BLD-MCNC: 11.3 G/DL (ref 13.3–17.7)
MCH RBC QN AUTO: 28 PG (ref 26.5–33)
MCHC RBC AUTO-ENTMCNC: 31.9 G/DL (ref 31.5–36.5)
MCV RBC AUTO: 88 FL (ref 78–100)
PLATELET # BLD AUTO: 177 10E3/UL (ref 150–450)
POTASSIUM SERPL-SCNC: 4.6 MMOL/L (ref 3.4–5.3)
PROT SERPL-MCNC: 6.9 G/DL (ref 6.4–8.3)
RBC # BLD AUTO: 4.04 10E6/UL (ref 4.4–5.9)
SODIUM SERPL-SCNC: 136 MMOL/L (ref 135–145)
WBC # BLD AUTO: 8 10E3/UL (ref 4–11)

## 2024-09-07 PROCEDURE — 84295 ASSAY OF SERUM SODIUM: CPT | Performed by: INTERNAL MEDICINE

## 2024-09-07 PROCEDURE — 250N000011 HC RX IP 250 OP 636: Performed by: INTERNAL MEDICINE

## 2024-09-07 PROCEDURE — 250N000013 HC RX MED GY IP 250 OP 250 PS 637: Performed by: SURGERY

## 2024-09-07 PROCEDURE — 84155 ASSAY OF PROTEIN SERUM: CPT | Performed by: INTERNAL MEDICINE

## 2024-09-07 PROCEDURE — 250N000013 HC RX MED GY IP 250 OP 250 PS 637: Performed by: INTERNAL MEDICINE

## 2024-09-07 PROCEDURE — 85027 COMPLETE CBC AUTOMATED: CPT | Performed by: INTERNAL MEDICINE

## 2024-09-07 PROCEDURE — 250N000011 HC RX IP 250 OP 636: Performed by: PHYSICIAN ASSISTANT

## 2024-09-07 PROCEDURE — 250N000011 HC RX IP 250 OP 636: Performed by: SURGERY

## 2024-09-07 PROCEDURE — 120N000004 HC R&B MS OVERFLOW

## 2024-09-07 PROCEDURE — 36415 COLL VENOUS BLD VENIPUNCTURE: CPT | Performed by: INTERNAL MEDICINE

## 2024-09-07 PROCEDURE — 99233 SBSQ HOSP IP/OBS HIGH 50: CPT | Performed by: INTERNAL MEDICINE

## 2024-09-07 PROCEDURE — 84460 ALANINE AMINO (ALT) (SGPT): CPT | Performed by: INTERNAL MEDICINE

## 2024-09-07 RX ORDER — DEXTROSE MONOHYDRATE 25 G/50ML
25-50 INJECTION, SOLUTION INTRAVENOUS
Status: DISCONTINUED | OUTPATIENT
Start: 2024-09-07 | End: 2024-09-07

## 2024-09-07 RX ORDER — LISINOPRIL 5 MG/1
10 TABLET ORAL DAILY
Status: DISCONTINUED | OUTPATIENT
Start: 2024-09-07 | End: 2024-09-09 | Stop reason: HOSPADM

## 2024-09-07 RX ORDER — FUROSEMIDE 10 MG/ML
40 INJECTION INTRAMUSCULAR; INTRAVENOUS ONCE
Status: COMPLETED | OUTPATIENT
Start: 2024-09-07 | End: 2024-09-07

## 2024-09-07 RX ORDER — NICOTINE POLACRILEX 4 MG
15-30 LOZENGE BUCCAL
Status: DISCONTINUED | OUTPATIENT
Start: 2024-09-07 | End: 2024-09-07

## 2024-09-07 RX ORDER — NALOXONE HYDROCHLORIDE 0.4 MG/ML
0.4 INJECTION, SOLUTION INTRAMUSCULAR; INTRAVENOUS; SUBCUTANEOUS
Status: DISCONTINUED | OUTPATIENT
Start: 2024-09-07 | End: 2024-09-09 | Stop reason: HOSPADM

## 2024-09-07 RX ORDER — HYDROXYZINE HYDROCHLORIDE 25 MG/1
25 TABLET, FILM COATED ORAL EVERY 6 HOURS PRN
Status: DISCONTINUED | OUTPATIENT
Start: 2024-09-07 | End: 2024-09-09 | Stop reason: HOSPADM

## 2024-09-07 RX ORDER — NALOXONE HYDROCHLORIDE 0.4 MG/ML
0.2 INJECTION, SOLUTION INTRAMUSCULAR; INTRAVENOUS; SUBCUTANEOUS
Status: DISCONTINUED | OUTPATIENT
Start: 2024-09-07 | End: 2024-09-09 | Stop reason: HOSPADM

## 2024-09-07 RX ORDER — METFORMIN HCL 500 MG
1000 TABLET, EXTENDED RELEASE 24 HR ORAL 2 TIMES DAILY
Status: DISCONTINUED | OUTPATIENT
Start: 2024-09-07 | End: 2024-09-09 | Stop reason: HOSPADM

## 2024-09-07 RX ADMIN — KETOROLAC TROMETHAMINE 15 MG: 15 INJECTION, SOLUTION INTRAMUSCULAR; INTRAVENOUS at 20:44

## 2024-09-07 RX ADMIN — ATORVASTATIN CALCIUM 40 MG: 20 TABLET, FILM COATED ORAL at 21:47

## 2024-09-07 RX ADMIN — PANTOPRAZOLE SODIUM 40 MG: 40 TABLET, DELAYED RELEASE ORAL at 09:33

## 2024-09-07 RX ADMIN — GABAPENTIN 400 MG: 300 CAPSULE ORAL at 15:47

## 2024-09-07 RX ADMIN — APIXABAN 5 MG: 5 TABLET, FILM COATED ORAL at 20:49

## 2024-09-07 RX ADMIN — PROPRANOLOL HYDROCHLORIDE 20 MG: 20 TABLET ORAL at 20:49

## 2024-09-07 RX ADMIN — FUROSEMIDE 40 MG: 10 INJECTION, SOLUTION INTRAMUSCULAR; INTRAVENOUS at 13:09

## 2024-09-07 RX ADMIN — CALCIUM 500 MG: 500 TABLET ORAL at 09:35

## 2024-09-07 RX ADMIN — LISINOPRIL 10 MG: 5 TABLET ORAL at 09:34

## 2024-09-07 RX ADMIN — GABAPENTIN 400 MG: 300 CAPSULE ORAL at 09:33

## 2024-09-07 RX ADMIN — GABAPENTIN 400 MG: 300 CAPSULE ORAL at 20:49

## 2024-09-07 RX ADMIN — PROPRANOLOL HYDROCHLORIDE 20 MG: 20 TABLET ORAL at 09:32

## 2024-09-07 RX ADMIN — CALCIUM 500 MG: 500 TABLET ORAL at 21:47

## 2024-09-07 RX ADMIN — METFORMIN ER 500 MG 1000 MG: 500 TABLET ORAL at 20:49

## 2024-09-07 RX ADMIN — FERROUS SULFATE TAB 325 MG (65 MG ELEMENTAL FE) 325 MG: 325 (65 FE) TAB at 09:33

## 2024-09-07 RX ADMIN — PANTOPRAZOLE SODIUM 40 MG: 40 TABLET, DELAYED RELEASE ORAL at 18:11

## 2024-09-07 RX ADMIN — CEFTRIAXONE 2 G: 2 INJECTION, POWDER, FOR SOLUTION INTRAMUSCULAR; INTRAVENOUS at 20:50

## 2024-09-07 ASSESSMENT — ACTIVITIES OF DAILY LIVING (ADL)
ADLS_ACUITY_SCORE: 23
ADLS_ACUITY_SCORE: 24
ADLS_ACUITY_SCORE: 24
ADLS_ACUITY_SCORE: 23
ADLS_ACUITY_SCORE: 24
ADLS_ACUITY_SCORE: 24
ADLS_ACUITY_SCORE: 23
ADLS_ACUITY_SCORE: 24
ADLS_ACUITY_SCORE: 24
ADLS_ACUITY_SCORE: 22
ADLS_ACUITY_SCORE: 24

## 2024-09-07 NOTE — PLAN OF CARE
"Goal Outcome Evaluation:      Plan of Care Reviewed With: patient    Overall Patient Progress: improving          VS: Afebrile. On 4L O2 via NC overnight to maintain sats. Other VSS.  Respiratory: Lung sounds clear, but diminished in lower lobes. Shallow breaths.   GI: Hypoactive bowel sounds. Not passing gas. Denies nausea. Soft, tender abdomen. Bedtime , 4 units of insulin given per orders; Pts 2 am BG check 364, one time dose of 8 units given per providers order.   : Voiding.   Skin: Dried drainage on LEANDRA site dressing. Lap sites clean, dry, intact.   Activity: SBA.   Pain: Rating pain 4-5. Pt declining PRN meds. Using ice pack.  Lines: 140 mL of milky/serosanguinous drainage from LEANDRA drain overnight; stripping drain per orders. L PIV infusing LR @ 10 mL/hr.   Plan: LEANDRA drain management. BG management. Monitor vital signs. Pain management. IV antibiotics.          Problem: Adult Inpatient Plan of Care  Goal: Plan of Care Review  Description: The Plan of Care Review/Shift note should be completed every shift.  The Outcome Evaluation is a brief statement about your assessment that the patient is improving, declining, or no change.  This information will be displayed automatically on your shift  note.  Outcome: Not Progressing  Flowsheets (Taken 9/7/2024 9953)  Plan of Care Reviewed With: patient  Overall Patient Progress: improving  Goal: Patient-Specific Goal (Individualized)  Description: You can add care plan individualizations to a care plan. Examples of Individualization might be:  \"Parent requests to be called daily at 9am for status\", \"I have a hard time hearing out of my right ear\", or \"Do not touch me to wake me up as it startles  me\".  Outcome: Not Progressing  Goal: Absence of Hospital-Acquired Illness or Injury  Outcome: Not Progressing  Intervention: Identify and Manage Fall Risk  Recent Flowsheet Documentation  Taken 9/6/2024 2102 by Rabia Barrera RN  Safety Promotion/Fall Prevention:   " activity supervised   assistive device/personal items within reach   clutter free environment maintained   lighting adjusted   nonskid shoes/slippers when out of bed   patient and family education   room near nurse's station   room organization consistent   safety round/check completed  Intervention: Prevent Skin Injury  Recent Flowsheet Documentation  Taken 9/7/2024 0252 by Rabia Barrera RN  Body Position: position changed independently  Taken 9/6/2024 2347 by Rabia Barrera RN  Body Position: position changed independently  Taken 9/6/2024 2102 by Rabia Barrera RN  Body Position: position changed independently  Skin Protection: adhesive use limited  Device Skin Pressure Protection:   adhesive use limited   tubing/devices free from skin contact  Intervention: Prevent and Manage VTE (Venous Thromboembolism) Risk  Recent Flowsheet Documentation  Taken 9/6/2024 2102 by Rabia Barrera RN  VTE Prevention/Management: SCDs on (sequential compression devices)  Intervention: Prevent Infection  Recent Flowsheet Documentation  Taken 9/6/2024 2102 by Rabia Barrera RN  Infection Prevention:   environmental surveillance performed   equipment surfaces disinfected   hand hygiene promoted   personal protective equipment utilized   rest/sleep promoted   single patient room provided  Goal: Optimal Comfort and Wellbeing  Outcome: Not Progressing  Intervention: Monitor Pain and Promote Comfort  Recent Flowsheet Documentation  Taken 9/7/2024 0252 by Rabia Barrera RN  Pain Management Interventions:   medication offered but refused   care clustered   cold applied   emotional support   distraction   rest   repositioned  Taken 9/6/2024 2347 by Rabia Barrera RN  Pain Management Interventions:   medication offered but refused   care clustered   cold applied   emotional support   distraction   rest   repositioned  Taken 9/6/2024 2102 by Rabia Barrera RN  Pain Management Interventions:   medication offered but refused   care  clustered   cold applied   emotional support   distraction   rest   repositioned  Goal: Readiness for Transition of Care  Outcome: Not Progressing     Problem: Cholecystectomy  Goal: Absence of Bleeding  Outcome: Not Progressing  Intervention: Monitor and Manage Bleeding  Recent Flowsheet Documentation  Taken 9/6/2024 2102 by Rabia Barrera RN  Bleeding Management: dressing monitored  Goal: Effective Bowel Elimination  Outcome: Not Progressing  Intervention: Enhance Bowel Motility and Elimination  Recent Flowsheet Documentation  Taken 9/6/2024 2102 by Rabia Barrera RN  Bowel Motility Enhancement:   ambulation promoted   fluid intake encouraged   oral intake encouraged  Goal: Fluid and Electrolyte Balance  Outcome: Not Progressing  Intervention: Monitor and Manage Fluid and Electrolyte Balance  Recent Flowsheet Documentation  Taken 9/6/2024 2102 by Rabia Barrera RN  Fluid/Electrolyte Management: fluids provided  Goal: Absence of Infection Signs and Symptoms  Outcome: Not Progressing  Goal: Anesthesia/Sedation Recovery  Outcome: Not Progressing  Intervention: Optimize Anesthesia Recovery  Recent Flowsheet Documentation  Taken 9/6/2024 2102 by Rabia Barrera RN  Safety Promotion/Fall Prevention:   activity supervised   assistive device/personal items within reach   clutter free environment maintained   lighting adjusted   nonskid shoes/slippers when out of bed   patient and family education   room near nurse's station   room organization consistent   safety round/check completed  Administration (IS): self-administered  Goal: Pain Control and Function  Outcome: Not Progressing  Intervention: Prevent or Manage Pain  Recent Flowsheet Documentation  Taken 9/7/2024 0252 by Rabia Barrera RN  Pain Management Interventions:   medication offered but refused   care clustered   cold applied   emotional support   distraction   rest   repositioned  Taken 9/6/2024 2347 by Rabia Barrera RN  Pain Management Interventions:    medication offered but refused   care clustered   cold applied   emotional support   distraction   rest   repositioned  Taken 9/6/2024 2102 by Rabia Barrera RN  Pain Management Interventions:   medication offered but refused   care clustered   cold applied   emotional support   distraction   rest   repositioned  Goal: Nausea and Vomiting Relief  Outcome: Not Progressing  Goal: Effective Urinary Elimination  Outcome: Not Progressing  Intervention: Monitor and Manage Urinary Retention  Recent Flowsheet Documentation  Taken 9/6/2024 2102 by Rabia Barrera RN  Urinary Elimination Promotion: frequent voiding encouraged  Goal: Effective Oxygenation and Ventilation  Outcome: Not Progressing  Intervention: Optimize Oxygenation and Ventilation  Recent Flowsheet Documentation  Taken 9/7/2024 0252 by Rabia Barrera RN  Head of Bed (HOB) Positioning: HOB at 20-30 degrees  Taken 9/6/2024 2347 by Rabia Barrera RN  Head of Bed (HOB) Positioning: HOB at 20-30 degrees  Taken 9/6/2024 2102 by Rabia Barrera RN  Airway/Ventilation Management:   calming measures promoted   pulmonary hygiene promoted  Head of Bed (HOB) Positioning: HOB at 20-30 degrees     Problem: Pain Acute  Goal: Optimal Pain Control and Function  Outcome: Not Progressing  Intervention: Develop Pain Management Plan  Recent Flowsheet Documentation  Taken 9/7/2024 0252 by Rabia Barrera RN  Pain Management Interventions:   medication offered but refused   care clustered   cold applied   emotional support   distraction   rest   repositioned  Taken 9/6/2024 2347 by Rabia Barrera RN  Pain Management Interventions:   medication offered but refused   care clustered   cold applied   emotional support   distraction   rest   repositioned  Taken 9/6/2024 2102 by Rabia Barrera, RN  Pain Management Interventions:   medication offered but refused   care clustered   cold applied   emotional support   distraction   rest   repositioned  Intervention: Prevent or Manage  "Pain  Recent Flowsheet Documentation  Taken 9/6/2024 2102 by Rabia Barrera, RN  Sensory Stimulation Regulation:   care clustered   quiet environment promoted   television on  Sleep/Rest Enhancement:   awakenings minimized   consistent schedule promoted   regular sleep/rest pattern promoted   relaxation techniques promoted  Bowel Elimination Promotion:   adequate fluid intake promoted   ambulation promoted  Medication Review/Management: medications reviewed  Intervention: Optimize Psychosocial Wellbeing  Recent Flowsheet Documentation  Taken 9/6/2024 2102 by Rabia Barrera, RN  Supportive Measures:   active listening utilized   counseling provided   decision-making supported   goal-setting facilitated   problem-solving facilitated   relaxation techniques promoted   verbalization of feelings encouraged     Problem: Adult Inpatient Plan of Care  Goal: Plan of Care Review  Description: The Plan of Care Review/Shift note should be completed every shift.  The Outcome Evaluation is a brief statement about your assessment that the patient is improving, declining, or no change.  This information will be displayed automatically on your shift  note.  Outcome: Not Progressing  Flowsheets (Taken 9/7/2024 0458)  Plan of Care Reviewed With: patient  Overall Patient Progress: improving  Goal: Patient-Specific Goal (Individualized)  Description: You can add care plan individualizations to a care plan. Examples of Individualization might be:  \"Parent requests to be called daily at 9am for status\", \"I have a hard time hearing out of my right ear\", or \"Do not touch me to wake me up as it startles  me\".  Outcome: Not Progressing  Goal: Absence of Hospital-Acquired Illness or Injury  Outcome: Not Progressing  Intervention: Identify and Manage Fall Risk  Recent Flowsheet Documentation  Taken 9/6/2024 2102 by Rabia Barrera RN  Safety Promotion/Fall Prevention:   activity supervised   assistive device/personal items within reach   " clutter free environment maintained   lighting adjusted   nonskid shoes/slippers when out of bed   patient and family education   room near nurse's station   room organization consistent   safety round/check completed  Intervention: Prevent Skin Injury  Recent Flowsheet Documentation  Taken 9/7/2024 0252 by Rabia Barrera RN  Body Position: position changed independently  Taken 9/6/2024 2347 by Rabia Barrera RN  Body Position: position changed independently  Taken 9/6/2024 2102 by Rabia Barrera RN  Body Position: position changed independently  Skin Protection: adhesive use limited  Device Skin Pressure Protection:   adhesive use limited   tubing/devices free from skin contact  Intervention: Prevent and Manage VTE (Venous Thromboembolism) Risk  Recent Flowsheet Documentation  Taken 9/6/2024 2102 by Rabia Barrera RN  VTE Prevention/Management: SCDs on (sequential compression devices)  Intervention: Prevent Infection  Recent Flowsheet Documentation  Taken 9/6/2024 2102 by Rabia Barrera RN  Infection Prevention:   environmental surveillance performed   equipment surfaces disinfected   hand hygiene promoted   personal protective equipment utilized   rest/sleep promoted   single patient room provided  Goal: Optimal Comfort and Wellbeing  Outcome: Not Progressing  Intervention: Monitor Pain and Promote Comfort  Recent Flowsheet Documentation  Taken 9/7/2024 0252 by Rabia Barrera RN  Pain Management Interventions:   medication offered but refused   care clustered   cold applied   emotional support   distraction   rest   repositioned  Taken 9/6/2024 2347 by Rabia Barrera RN  Pain Management Interventions:   medication offered but refused   care clustered   cold applied   emotional support   distraction   rest   repositioned  Taken 9/6/2024 2102 by Rabia Barrera RN  Pain Management Interventions:   medication offered but refused   care clustered   cold applied   emotional support   distraction   rest    repositioned  Goal: Readiness for Transition of Care  Outcome: Not Progressing     Problem: Cholecystectomy  Goal: Absence of Bleeding  Outcome: Not Progressing  Intervention: Monitor and Manage Bleeding  Recent Flowsheet Documentation  Taken 9/6/2024 2102 by Rabia Barrera RN  Bleeding Management: dressing monitored  Goal: Effective Bowel Elimination  Outcome: Not Progressing  Intervention: Enhance Bowel Motility and Elimination  Recent Flowsheet Documentation  Taken 9/6/2024 2102 by Rabia Barrera RN  Bowel Motility Enhancement:   ambulation promoted   fluid intake encouraged   oral intake encouraged  Goal: Fluid and Electrolyte Balance  Outcome: Not Progressing  Intervention: Monitor and Manage Fluid and Electrolyte Balance  Recent Flowsheet Documentation  Taken 9/6/2024 2102 by Rabia Barrera RN  Fluid/Electrolyte Management: fluids provided  Goal: Absence of Infection Signs and Symptoms  Outcome: Not Progressing  Goal: Anesthesia/Sedation Recovery  Outcome: Not Progressing  Intervention: Optimize Anesthesia Recovery  Recent Flowsheet Documentation  Taken 9/6/2024 2102 by Rabia Barrera RN  Safety Promotion/Fall Prevention:   activity supervised   assistive device/personal items within reach   clutter free environment maintained   lighting adjusted   nonskid shoes/slippers when out of bed   patient and family education   room near nurse's station   room organization consistent   safety round/check completed  Administration (IS): self-administered  Goal: Pain Control and Function  Outcome: Not Progressing  Intervention: Prevent or Manage Pain  Recent Flowsheet Documentation  Taken 9/7/2024 0252 by Rabia Barrera RN  Pain Management Interventions:   medication offered but refused   care clustered   cold applied   emotional support   distraction   rest   repositioned  Taken 9/6/2024 2347 by Rabia Barrera RN  Pain Management Interventions:   medication offered but refused   care clustered   cold applied    emotional support   distraction   rest   repositioned  Taken 9/6/2024 2102 by Rabia Barrera RN  Pain Management Interventions:   medication offered but refused   care clustered   cold applied   emotional support   distraction   rest   repositioned  Goal: Nausea and Vomiting Relief  Outcome: Not Progressing  Goal: Effective Urinary Elimination  Outcome: Not Progressing  Intervention: Monitor and Manage Urinary Retention  Recent Flowsheet Documentation  Taken 9/6/2024 2102 by Rabia Barrera RN  Urinary Elimination Promotion: frequent voiding encouraged  Goal: Effective Oxygenation and Ventilation  Outcome: Not Progressing  Intervention: Optimize Oxygenation and Ventilation  Recent Flowsheet Documentation  Taken 9/7/2024 0252 by Rabia Barrera RN  Head of Bed (HOB) Positioning: HOB at 20-30 degrees  Taken 9/6/2024 2347 by Rabia Barrera RN  Head of Bed (HOB) Positioning: HOB at 20-30 degrees  Taken 9/6/2024 2102 by Rabia Barrera RN  Airway/Ventilation Management:   calming measures promoted   pulmonary hygiene promoted  Head of Bed (HOB) Positioning: HOB at 20-30 degrees     Problem: Pain Acute  Goal: Optimal Pain Control and Function  Outcome: Not Progressing  Intervention: Develop Pain Management Plan  Recent Flowsheet Documentation  Taken 9/7/2024 0252 by Rabia Barrera RN  Pain Management Interventions:   medication offered but refused   care clustered   cold applied   emotional support   distraction   rest   repositioned  Taken 9/6/2024 2347 by Rabia Barrera RN  Pain Management Interventions:   medication offered but refused   care clustered   cold applied   emotional support   distraction   rest   repositioned  Taken 9/6/2024 2102 by Rabia Barrera RN  Pain Management Interventions:   medication offered but refused   care clustered   cold applied   emotional support   distraction   rest   repositioned  Intervention: Prevent or Manage Pain  Recent Flowsheet Documentation  Taken 9/6/2024 2102 by Bruce  Rabia KAUR, RN  Sensory Stimulation Regulation:   care clustered   quiet environment promoted   television on  Sleep/Rest Enhancement:   awakenings minimized   consistent schedule promoted   regular sleep/rest pattern promoted   relaxation techniques promoted  Bowel Elimination Promotion:   adequate fluid intake promoted   ambulation promoted  Medication Review/Management: medications reviewed  Intervention: Optimize Psychosocial Wellbeing  Recent Flowsheet Documentation  Taken 9/6/2024 2102 by Rabia Barrera, RN  Supportive Measures:   active listening utilized   counseling provided   decision-making supported   goal-setting facilitated   problem-solving facilitated   relaxation techniques promoted   verbalization of feelings encouraged

## 2024-09-07 NOTE — PLAN OF CARE
"Goal Outcome Evaluation:      Plan of Care Reviewed With: patient    Overall Patient Progress: improvingOverall Patient Progress: improving    VSS. Independent in room. LS clear and diminished on lower lobes. Received pt on 4L NC and weaned to room air at 1600. Abdomen rounded, soft and tender. LEANDRA drain intact and outputing milky pink tinged, tan  fluid. Total output this shift 90ml. Unable to pass  gas. No BM. Tolerating diet. PIV saline locked. Rating pain 3-4/10 pain. Pt declines PRNs. Pt's blood sugars 307, 298, and 315. Insulin given per MAR.     Problem: Adult Inpatient Plan of Care  Goal: Plan of Care Review  Description: The Plan of Care Review/Shift note should be completed every shift.  The Outcome Evaluation is a brief statement about your assessment that the patient is improving, declining, or no change.  This information will be displayed automatically on your shift  note.  Outcome: Progressing  Flowsheets (Taken 9/7/2024 1717)  Plan of Care Reviewed With: patient  Overall Patient Progress: improving  Goal: Patient-Specific Goal (Individualized)  Description: You can add care plan individualizations to a care plan. Examples of Individualization might be:  \"Parent requests to be called daily at 9am for status\", \"I have a hard time hearing out of my right ear\", or \"Do not touch me to wake me up as it startles  me\".  Outcome: Progressing  Goal: Absence of Hospital-Acquired Illness or Injury  Outcome: Progressing  Intervention: Identify and Manage Fall Risk  Recent Flowsheet Documentation  Taken 9/7/2024 1020 by Savanna Ruiz RN  Safety Promotion/Fall Prevention:   activity supervised   assistive device/personal items within reach   clutter free environment maintained   lighting adjusted   nonskid shoes/slippers when out of bed   patient and family education   room near nurse's station   room organization consistent   safety round/check completed  Intervention: Prevent Skin Injury  Recent Flowsheet " Documentation  Taken 9/7/2024 1020 by Savanna Ruiz RN  Body Position: position changed independently  Intervention: Prevent and Manage VTE (Venous Thromboembolism) Risk  Recent Flowsheet Documentation  Taken 9/7/2024 1020 by Savanna Ruiz RN  VTE Prevention/Management: SCDs on (sequential compression devices)  Intervention: Prevent Infection  Recent Flowsheet Documentation  Taken 9/7/2024 1020 by Savanna Ruiz RN  Infection Prevention:   environmental surveillance performed   equipment surfaces disinfected   hand hygiene promoted   personal protective equipment utilized   rest/sleep promoted   single patient room provided  Goal: Optimal Comfort and Wellbeing  Outcome: Progressing  Goal: Readiness for Transition of Care  Outcome: Progressing

## 2024-09-07 NOTE — PROGRESS NOTES
"[facility]   General Surgery Progress Note           Assessment and Plan:   Assessment:   Acute gangrenous cholecystitis  Sepsis due to above; fever, leukocytosis, tachypnea  Cirrhosis with ascites   H/o DVT, Apixaban therapy  -1 Day Post-Op Laparoscopic cholecystectomy, cirrhosis and ascites noted, liver hemostasis achieved with cautery and surgicel powder, drain placement; Pathology: pending  -Postoperative hypoxia, supplemental oxygen; wean as able; h/o GILA, hadn't used CPAP since losing 80 lbs.  -HGB change within postop expectation, no bloody drain output  -Afebrile      Plan:   -IV fluids: Lactated Ringer @10 mL/hr  -IV Rocephin  -Monitor drain output, plan removal tomorrow when clears  -Pain management: oxycodone available, pt prefers to avoid narcotics, will defer to Hospitalist if acetaminophen or NSAID ok with his cirrhosis.  -Prophylaxis: Pneumatic Compression Devices  -Diet: recommend low fat diet 1-2 weeks.  -Advance activity as tolerated, encouraged OOB and ambulate 3-4 times a day  -IS hourly as able, encouraged  -OK to restart anticoagulation  -Dispo: Hopefully home tomorrow and remove drain prior to release.         Interval History:   Pain with movement as expected, umbilical site and around the drain.  Avoiding narcotic, encouraged use of pain meds and ice packs to be comfortable to allow ambulation and activity.  Using IS, has h/o GILA but then lost 80 lbs and stopped using CPAP.  No flatus yet.  Tolerated regular foods last evening, encouraged to take diet slowly until passing gas.         Physical Exam:   Blood pressure 118/66, pulse 71, temperature 98.2  F (36.8  C), resp. rate 18, height 1.803 m (5' 11\"), weight 107.6 kg (237 lb 3.4 oz), SpO2 95%.  I/O last 3 completed shifts:  In: 1439.33 [P.O.:120; I.V.:1319.33]  Out: 270 [Drains:270]  Abdomen:   Rotund, perhaps mildly distended, tenderness noted right abd near drain and at umbilical incision site, and hypoactive bowel sounds "   Incisions - clean, dry, skin glue intact    [place] drain x1 @ right lateral - serous, pink, mildly turbid, no bilious appearance or blood/clots.          Data:   Pathology: pending    Recent Labs   Lab 09/07/24  0635 09/05/24  0740 09/04/24  1243   WBC 8.0 11.1* 15.5*   HGB 11.3* 11.3* 12.8*   HCT 35.4* 35.0* 38.9*   MCV 88 88 87    156 192     Recent Labs   Lab 09/07/24  0727 09/07/24  0635 09/07/24  0216 09/05/24  0905 09/05/24  0740 09/04/24  1759 09/04/24  1243   NA  --  136  --   --  134*  --  134*   POTASSIUM  --  4.6  --   --  3.8  --  3.7   CHLORIDE  --  101  --   --  99  --  99   CO2  --  23  --   --  20*  --  22   ANIONGAP  --  12  --   --  15  --  13   * 313* 364*   < > 164*   < > 164*   BUN  --  30.7*  --   --  13.9  --  16.1   CR  --  1.09  --   --  1.11  --  1.08   GFRESTIMATED  --  77  --   --  75  --  78   NARA  --  8.4*  --   --  8.0*  --  8.7*   PROTTOTAL  --  6.9  --   --  6.7  --  7.7   ALBUMIN  --  3.1*  --   --  3.2*  --  3.9   BILITOTAL  --  0.4  --   --  1.3*  --  1.6*   ALKPHOS  --  64  --   --  53  --  61   AST  --   --   --   --  20  --  22   ALT  --  28  --   --  15  --  22    < > = values in this interval not displayed.     Recent Labs   Lab 09/04/24  1243   LIPASE 28       Ashtyn Oneill PA-C

## 2024-09-07 NOTE — PROGRESS NOTES
[facility]    Medicine Progress Note - Hospitalist Service    Date of Admission:  9/4/2024    Assessment & Plan     Crispin Rojas is a 64 year old male with Pmhx of DVT on apixaban, CVA, essential hypertension, hypercholesteremia, T2DM, peripheral vascular disease, obstructive sleep apnea, SNYDER Cirrhosis, who was admitted on 9/4/2024 with acute cholecystitis.     In the ED temp 99.5F, VSS. Lab work with neutrophilic leukocytosis, CMP with borderline hyponatremia, total bilirubin of 1.6 with normal AST, ALT, alk phos.  No lactic acidosis.  Upper quadrant ultrasound with cholelithiasis, gallbladder wall thickening concerning for acute versus chronic cholecystitis.  No biliary dilation, common duct measuring 4 mm.  Given Rocephin in the ED.  Admission was requested from hospitalist service for further cares and monitoring.  General surgery was contacted from the emergency department who did not recommend cholecystectomy from the emergency department due to anticoagulation.  DOAC has been held since admit.     Acute gangrenous cholecystitis.  Sepsis due to cholecystitis.  -Continue IV ceftriaxone.  -Status postcholecystectomy on 9/6 by general surgery.  -Pain medications as needed.  -Use incentive spirometry.     Acute hypoxic respiratory failure.  -Supplemental oxygen as needed.  -Use incentive spirometry.  -Furosemide 40 mg IV once now.  -Weigh daily.     Diabetes mellitus type 2.  Chronic diabetic left foot ulcer.  Diabetic neuropathy.  -Increase Tresiba insulin to 45 units a day today.  -Increase scheduled aspart insulin on a carb counting basis with meals to 1 unit per 5 g carbohydrates.  -Increase additional aspart insulin sliding scale as needed to high intensity protocol.  -Continue gabapentin 400 mg 3 times a day.  -Restart metformin 1000 mg a day.     Hypertension.  -Continue to hold hydrochlorothiazide, amlodipine for now.  -Restart lisinopril 10 mg a day.  -Continue propranolol 20  "mg twice a day.     GERD.  -Continue pantoprazole 40 mg twice a day.     History of DVT.  Drug-induced coagulation defect.  -Apixaban currently on hold due to need for above surgery.  -Restart apixaban on 9/7.     Hyperlipidemia.  -Restart atorvastatin 40 mg a day.     Hyponatremia.  -Mild.  Sodium 134.  -Resolved.    Cirrhosis.  Nonalcoholic steatohepatitis.  -Some ascites noted during surgery.  -Give furosemide 40 mg IV once.  -Weigh daily.                 Diet: High Consistent Carb (75 g CHO per Meal) Diet    DVT Prophylaxis: DOAC  Mccarthy Catheter: Not present  Lines: None     Cardiac Monitoring: None  Code Status: Full Code      Clinically Significant Risk Factors              # Hypoalbuminemia: Lowest albumin = 3.1 g/dL at 9/7/2024  6:35 AM, will monitor as appropriate     # Hypertension: Noted on problem list          # DMII: A1C = 6.8 % (Ref range: 0.0 - 5.6 %) within past 6 months, PRESENT ON ADMISSION  # Obesity: Estimated body mass index is 33.08 kg/m  as calculated from the following:    Height as of this encounter: 1.803 m (5' 11\").    Weight as of this encounter: 107.6 kg (237 lb 3.4 oz)., PRESENT ON ADMISSION                  Disposition Plan     Medically Ready for Discharge: Anticipated Tomorrow             Gordon Lerner DO  Hospitalist Service  [facility]  Securely message with [employer] (more info)  Text page via [facility] Paging/Directory   ______________________________________________________________________    Interval History   Having some abdominal pain.  Short of breath at times.  Denies chest pain, fevers, chills, nausea, vomiting.    Physical Exam   Vital Signs: Temp: 98.2  F (36.8  C) Temp src: Oral BP: 118/66 Pulse: 71   Resp: 18 SpO2: 95 % O2 Device: Nasal cannula Oxygen Delivery: 2 LPM  Weight: 237 lbs 3.44 oz    Gen:  NAD, A&Ox3.  Eyes:  PERRL, sclera anicteric.  OP:  MMM, no lesions.  Neck:  Supple.  CV:  Regular, no murmurs.  Lung: Scattered crackles at bases " b/l, normal effort.  Ab:  +BS, soft.  Skin:  Warm, dry to touch.  No rash.  Ext:  No pitting edema LE b/l.      Medical Decision Making       53 MINUTES SPENT BY ME on the date of service doing chart review, history, exam, documentation & further activities per the note.      Data     I have personally reviewed the following data over the past 24 hrs:    8.0  \   11.3 (L)   / 177     136 101 30.7 (H) /  307 (H)   4.6 23 1.09 \     ALT: 28 AST: N/A AP: 64 TBILI: 0.4   ALB: 3.1 (L) TOT PROTEIN: 6.9 LIPASE: N/A       Imaging results reviewed over the past 24 hrs:   Recent Results (from the past 24 hour(s))   XR Chest Port 1 View    Narrative    CHEST ONE VIEW  9/6/2024 12:45 PM     HISTORY: Hypoxia.    COMPARISON: August 16, 2016      Impression    IMPRESSION: Low lung volumes. No acute infiltrates.    JOHN TATE MD         SYSTEM ID:  [id number]

## 2024-09-07 NOTE — PROVIDER NOTIFICATION
Provider notified of pt's Blood sugar of 307, pt declines short acting insulin when offered.     Provider aware no changes at this time.

## 2024-09-07 NOTE — PROVIDER NOTIFICATION
"Paged provider \"FYI- Pts 2 am blood glucose check was 364, after receiving 4 units of insulin at bedtime for blood glucose of 396\"    Provider put in order for another insulin dose.    Paged provider \"Do you want another blood glucose re-check overnight or wait until the 7:30 am morning check?\"    Provider okay with 0730 being the next blood glucose check.  "

## 2024-09-08 LAB
ANION GAP SERPL CALCULATED.3IONS-SCNC: 14 MMOL/L (ref 7–15)
BUN SERPL-MCNC: 30.2 MG/DL (ref 8–23)
CALCIUM SERPL-MCNC: 8.5 MG/DL (ref 8.8–10.4)
CHLORIDE SERPL-SCNC: 99 MMOL/L (ref 98–107)
CREAT SERPL-MCNC: 1.11 MG/DL (ref 0.67–1.17)
EGFRCR SERPLBLD CKD-EPI 2021: 75 ML/MIN/1.73M2
ERYTHROCYTE [DISTWIDTH] IN BLOOD BY AUTOMATED COUNT: 14.5 % (ref 10–15)
GLUCOSE BLDC GLUCOMTR-MCNC: 130 MG/DL (ref 70–99)
GLUCOSE BLDC GLUCOMTR-MCNC: 156 MG/DL (ref 70–99)
GLUCOSE BLDC GLUCOMTR-MCNC: 157 MG/DL (ref 70–99)
GLUCOSE BLDC GLUCOMTR-MCNC: 157 MG/DL (ref 70–99)
GLUCOSE BLDC GLUCOMTR-MCNC: 193 MG/DL (ref 70–99)
GLUCOSE SERPL-MCNC: 130 MG/DL (ref 70–99)
HCO3 SERPL-SCNC: 23 MMOL/L (ref 22–29)
HCT VFR BLD AUTO: 34.9 % (ref 40–53)
HGB BLD-MCNC: 11.1 G/DL (ref 13.3–17.7)
MCH RBC QN AUTO: 28 PG (ref 26.5–33)
MCHC RBC AUTO-ENTMCNC: 31.8 G/DL (ref 31.5–36.5)
MCV RBC AUTO: 88 FL (ref 78–100)
PLATELET # BLD AUTO: 239 10E3/UL (ref 150–450)
POTASSIUM SERPL-SCNC: 3.9 MMOL/L (ref 3.4–5.3)
RBC # BLD AUTO: 3.97 10E6/UL (ref 4.4–5.9)
SODIUM SERPL-SCNC: 136 MMOL/L (ref 135–145)
WBC # BLD AUTO: 9.2 10E3/UL (ref 4–11)

## 2024-09-08 PROCEDURE — 36415 COLL VENOUS BLD VENIPUNCTURE: CPT | Performed by: INTERNAL MEDICINE

## 2024-09-08 PROCEDURE — 99232 SBSQ HOSP IP/OBS MODERATE 35: CPT | Performed by: INTERNAL MEDICINE

## 2024-09-08 PROCEDURE — 250N000013 HC RX MED GY IP 250 OP 250 PS 637: Performed by: SURGERY

## 2024-09-08 PROCEDURE — 120N000004 HC R&B MS OVERFLOW

## 2024-09-08 PROCEDURE — 250N000011 HC RX IP 250 OP 636: Performed by: SURGERY

## 2024-09-08 PROCEDURE — 250N000013 HC RX MED GY IP 250 OP 250 PS 637: Performed by: INTERNAL MEDICINE

## 2024-09-08 PROCEDURE — 85014 HEMATOCRIT: CPT | Performed by: INTERNAL MEDICINE

## 2024-09-08 PROCEDURE — 80048 BASIC METABOLIC PNL TOTAL CA: CPT | Performed by: INTERNAL MEDICINE

## 2024-09-08 PROCEDURE — 250N000013 HC RX MED GY IP 250 OP 250 PS 637: Performed by: PHYSICIAN ASSISTANT

## 2024-09-08 RX ORDER — OXYCODONE HYDROCHLORIDE 5 MG/1
2.5-5 TABLET ORAL EVERY 4 HOURS PRN
Qty: 6 TABLET | Refills: 0 | Status: SHIPPED | OUTPATIENT
Start: 2024-09-08

## 2024-09-08 RX ORDER — METHOCARBAMOL 500 MG/1
500 TABLET, FILM COATED ORAL 3 TIMES DAILY
Qty: 30 TABLET | Refills: 0 | Status: SHIPPED | OUTPATIENT
Start: 2024-09-08

## 2024-09-08 RX ORDER — METHOCARBAMOL 500 MG/1
500 TABLET, FILM COATED ORAL 3 TIMES DAILY
Status: DISCONTINUED | OUTPATIENT
Start: 2024-09-08 | End: 2024-09-09 | Stop reason: HOSPADM

## 2024-09-08 RX ADMIN — PROPRANOLOL HYDROCHLORIDE 20 MG: 20 TABLET ORAL at 20:45

## 2024-09-08 RX ADMIN — CEFTRIAXONE 2 G: 2 INJECTION, POWDER, FOR SOLUTION INTRAMUSCULAR; INTRAVENOUS at 20:46

## 2024-09-08 RX ADMIN — GABAPENTIN 400 MG: 300 CAPSULE ORAL at 20:44

## 2024-09-08 RX ADMIN — ATORVASTATIN CALCIUM 40 MG: 20 TABLET, FILM COATED ORAL at 20:46

## 2024-09-08 RX ADMIN — GABAPENTIN 400 MG: 300 CAPSULE ORAL at 14:37

## 2024-09-08 RX ADMIN — CALCIUM 500 MG: 500 TABLET ORAL at 20:45

## 2024-09-08 RX ADMIN — APIXABAN 5 MG: 5 TABLET, FILM COATED ORAL at 20:44

## 2024-09-08 RX ADMIN — LISINOPRIL 10 MG: 5 TABLET ORAL at 08:38

## 2024-09-08 RX ADMIN — GABAPENTIN 400 MG: 300 CAPSULE ORAL at 08:35

## 2024-09-08 RX ADMIN — METHOCARBAMOL 500 MG: 500 TABLET ORAL at 14:37

## 2024-09-08 RX ADMIN — PROPRANOLOL HYDROCHLORIDE 20 MG: 20 TABLET ORAL at 09:15

## 2024-09-08 RX ADMIN — FERROUS SULFATE TAB 325 MG (65 MG ELEMENTAL FE) 325 MG: 325 (65 FE) TAB at 08:35

## 2024-09-08 RX ADMIN — METFORMIN ER 500 MG 1000 MG: 500 TABLET ORAL at 08:35

## 2024-09-08 RX ADMIN — PANTOPRAZOLE SODIUM 40 MG: 40 TABLET, DELAYED RELEASE ORAL at 17:52

## 2024-09-08 RX ADMIN — APIXABAN 5 MG: 5 TABLET, FILM COATED ORAL at 08:35

## 2024-09-08 RX ADMIN — METHOCARBAMOL 500 MG: 500 TABLET ORAL at 20:44

## 2024-09-08 RX ADMIN — METFORMIN ER 500 MG 1000 MG: 500 TABLET ORAL at 20:44

## 2024-09-08 RX ADMIN — CALCIUM 500 MG: 500 TABLET ORAL at 08:35

## 2024-09-08 ASSESSMENT — ACTIVITIES OF DAILY LIVING (ADL)
ADLS_ACUITY_SCORE: 22

## 2024-09-08 NOTE — DISCHARGE INSTRUCTIONS
HOME CARE FOLLOWING LAPAROSCOPIC CHOLECYSTECTOMY  AMADA Hannah, JOSE Benavides C. Pratt, J. Shaheen    INCISIONAL CARE:  Replace the bandage over your incisions and prior drain site DAILY until all drainage stops, or if more comfortable to have in place.  If Dermabond (a type of skin glue) is present, leave in place until it wears/flakes off (2-3 weeks).     BATHING:  OK to shower 48 hours after surgery.  Avoid baths for 1 week after surgery.  You may wash your hair at any time.  Gently pat your incision dry after bathing.  Do not apply lotions, creams, or ointments to incisions.    ACTIVITY:  Light Activity -- you may immediately be up and about as tolerated.  Walking is encouraged, increase as tolerated.  Driving/Light Work-- when comfortable and off narcotic pain medications.  Strenuous Work/Activity -- limit lifting to 20 pounds for 2 weeks.  Progressively increase with time.  Active Sports (running, biking, etc.) -- cautiously resume after 2 weeks.    DISCOMFORT:  Take the pain medication with some food, when possible, to minimize side effects.  Intermittent use of ice packs may help during the first 1-3 weeks after surgery.  Expect gradual improvement.    Over-the-counter anti-inflammatory medications (i.e. Ibuprofen/Advil/Motrin or Naprosyn/Aleve) may be used per package instructions in addition to or while tapering off the narcotic pain medications to decrease swelling and sensitivity.  DO NOT TAKE these Anti-inflammatory medications if your primary physician has advised against doing so, or if you have acid reflux, ulcer, or bleeding disorder, or take blood-thinner medications.  Call your primary physician or the surgery office if you have medication questions.    After laparoscopic cholecystectomy, you may have shoulder or upper back discomfort due to the gas used during surgery.  This is temporary and should resolve within 2-3 days.  Frequent short walks may help with this.  You  may have decreased energy level for 1-2 weeks after surgery related to your recovery.    DIET:  Start with liquids and gradually increase diet as tolerated.  Drink plenty of fluids.  While taking pain medications, consider use of a stool softener, increase your fiber in your diet, or add a fiber supplement (like Metamucil, Citrucel) to help prevent constipation - a possible side effect of pain medications.  It is not uncommon to experience some bowel changes (loose stools or constipation) after surgery.  Your body has to adapt to you no longer having a gall bladder.  To help minimize this side effect, avoid fatty foods for 1-2 weeks after surgery.  You may then slowly increase the amount of fatty foods in your diet.      NAUSEA:  If nauseated from the anesthetic/pain meds; rest in bed, get up cautiously with assistance, and drink clear liquids (juice, tea, broth).    FOLLOW-UP AFTER SURGERY:  -Our office will contact you approximately 2-3 weeks after surgery to check on your progress and answer any questions you may have.  If you are doing well, you will not need to return for an office appointment.  If any concerns are identified over the phone, we will help you make an appointment to see a provider.    -If you have not received a phone call, have any questions or concerns, or would like to be seen, please call us at [phone].  We are located at: [address], [address]; [place] [address]    -CONTACT US IF THE FOLLOWING DEVELOPS:   1. A fever that is above 101     2. Increased redness, warmth, drainage, bleeding, or swelling.   3. Pain that is not relieved by rest/ice and your prescription.   4.  Increasing pain after 48 hours.   5. Drainage that is thick, cloudy, yellow, green or white.   6. Any other questions or concerns.      FREQUENTLY ASKED QUESTIONS:    Q:  How should my incision look?    A:  Normally your incision will appear slightly swollen with light redness directly along the incision  itself as it heals.  It may feel like a bump or ridge as the healing/scarring happens, and over time (3-4 months) this bump or ridge feeling should slowly go away.  In general, clear or pink watery drainage can be normal at first as your incision heals, but should decrease over time.    Q:  How do I know if my incision is infected?  A:  Look at your incision for signs of infection, like redness around the incision spreading to surrounding skin, or drainage of cloudy or foul-smelling drainage.  If you feel warm, check your temperature to see if you are running a fever.    **If any of these things occur, please notify the nurse at our office.  We may need you to come into the office for an incision check.      Q:  How do I take care of my incision?  A:  If you have a dressing in place - Starting the day after surgery, replace the dressing 1-2 times a day until there is no further drainage from the incision.  At that time, a dressing is no longer needed.  Try to minimize tape on the skin if irritation is occurring at the tape sites.  If you have significant irritation from tape on the skin, please call the office to discuss other method of dressing your incision.    Small pieces of tape called  steri-strips  may be present directly overlying your incision; these may be removed 10 days after surgery unless otherwise specified by your surgeon.  If these tapes start to loosen at the ends, you may trim them back until they fall off or are removed.    A:  If you had  Dermabond  tissue glue used as a dressing (this causes your incision to look shiny with a clear covering over it) - This type of dressing wears off with time and does not require more dressings over the top unless it is draining around the glue as it wears off.  Do not apply ointments or lotions over the incisions until the glue has completely worn off.    Q:  There is a piece of tape or a sticky  lead  still on my skin.  Can I remove this?  A:  Sometimes the  sticky  leads  used for monitoring during surgery or for evaluation in the emergency department are not all removed while you are in the hospital.  These sometimes have a tab or metal dot on them.  You can easily remove these on your own, like taking off a band-aid.  If there is a gel substance under the  lead , simply wipe/clean it off with a washcloth or paper towel.      Q:  What can I do to minimize constipation (very hard stools, or lack of stools)?  A:  Stay well hydrated.  Increase your dietary fiber intake or take a fiber supplement -with plenty of water.  Walk around frequently.  You may consider an over-the-counter stool-softener.  Your Pharmacist can assist you with choosing one that is stocked at your pharmacy.  Constipation is also one of the most common side effects of pain medication.  If you are using pain medication, be pro-active and try to PREVENT problems with constipation by taking the steps above BEFORE constipation becomes a problem.    Q:  What do I do if I need more pain medications?  A:  Call the office to receive refills.  Be aware that certain pain meds cannot be called into a pharmacy and actually require a paper prescription.  A change may be made in your pain med as you progress thru your recovery period or if you have side effects to certain meds.    --Pain meds are NOT refilled after 5pm on weekdays, and NOT AT ALL on the weekends, so please look ahead to prevent problems.      Q:  Why am I having a hard time sleeping now that I am at home?  A:  Many medications you receive while you are in the hospital can impact your sleep for a number of days after your surgery/hospitalization.  Decreased level of activity and naps during the day may also make sleeping at night difficult.  Try to minimize day-time naps, and get up frequently during the day to walk around your home during your recovery time.  Sleep aides may be of some help, but are not recommended for long-term use.      Q:  I am  having some back discomfort.  What should I do?  A:  This may be related to certain positioning that was required for your surgery, extended periods of time in bed, or other changes in your overall activity level.  You may try ice, heat, acetaminophen, or ibuprofen to treat this temporarily.  Note that many pain medications have acetaminophen in them and would state this on the prescription bottle.  Be sure not to exceed the maximum of 4000mg per day of acetaminophen.     **If the pain you are having does not resolve, is severe, or is a flare of back pain you have had on other occasions prior to surgery, please contact your primary physician for further recommendations or for an appointment to be examined at their office.    Q:  Why am I having headaches?  A:  Headaches can be caused by many things:  caffeine withdrawal, use of pain meds, dehydration, high blood pressure, lack of sleep, over-activity/exhaustion, flare-up of usual migraine headaches.  If you feel this is related to muscle tension (a band-like feeling around the head, or a pressure at the low-back of the head) you may try ice or heat to this area.  You may need to drink more fluids (try electrolyte drink like Gatorade), rest, or take your usual migraine medications.   **If your headaches do not resolve, worsen, are accompanied by other symptoms, or if your blood pressure is high, please call your primary physician for recommendation and/or examination.    Q:  I am unable to urinate.  What do I do?  A:  A small percentage of people can have difficulty urinating initially after surgery.  This includes being able to urinate only a very small amount at a time and feeling discomfort or pressure in the very low abdomen.  This is called  urinary retention , and is actually an urgent situation.  Proceed to your nearest Emergency department for evaluation (not an Urgent Care Center).  Sometimes the bladder does not work correctly after certain medications you  receive during surgery, or related to certain procedures.  You may need to have a catheter placed until your bladder recovers.  When planning to go to an Emergency department, it may help to call the ER to let them know you are coming in for this problem after a surgery.  This may help you get in quicker to be evaluated.  **If you have symptoms of a urinary tract infection, please contact your primary physician for the proper evaluation and treatment.          If you have other questions, please call the office Monday thru Friday between 8am and 4:30pm to discuss with the nurse or physician assistant.  #[phone]    There is a surgeon ON CALL on weekday evenings and over the weekend in case of urgent need only, and may be contacted at the same number.    If you are having an emergency, call 911 or proceed to your nearest emergency department.

## 2024-09-08 NOTE — PROGRESS NOTES
[facility]    Medicine Progress Note - Hospitalist Service    Date of Admission:  9/4/2024    Assessment & Plan     Crispin Rojas is a 64 year old male with Pmhx of DVT on apixaban, CVA, essential hypertension, hypercholesteremia, T2DM, peripheral vascular disease, obstructive sleep apnea, SNYDER Cirrhosis, who was admitted on 9/4/2024 with acute cholecystitis.     In the ED temp 99.5F, VSS. Lab work with neutrophilic leukocytosis, CMP with borderline hyponatremia, total bilirubin of 1.6 with normal AST, ALT, alk phos.  No lactic acidosis.  Upper quadrant ultrasound with cholelithiasis, gallbladder wall thickening concerning for acute versus chronic cholecystitis.  No biliary dilation, common duct measuring 4 mm.  Given Rocephin in the ED.  Admission was requested from hospitalist service for further cares and monitoring.  General surgery was contacted from the emergency department who did not recommend cholecystectomy from the emergency department due to anticoagulation.  DOAC has been held since admit.     Acute gangrenous cholecystitis.  Sepsis due to cholecystitis.  -Continue IV ceftriaxone.  -Status postcholecystectomy on 9/6 by general surgery.  -Pain medications as needed.  -Use incentive spirometry.     Acute hypoxic respiratory failure.  -Supplemental oxygen as needed.  -Use incentive spirometry.  -Had 1 dose of furosemide 40 mg IV on 9/7.  -Currently able to be weaned off supplemental oxygen.  -Weigh daily.     Diabetes mellitus type 2.  Chronic diabetic left foot ulcer.  Diabetic neuropathy.  -Increase Tresiba insulin to 55 units a day.  -Continue scheduled aspart insulin on a carb counting basis with meals to 1 unit per 5 g carbohydrates.  -Continue additional aspart insulin sliding scale as needed to high intensity protocol.  -Continue gabapentin 400 mg 3 times a day.  -Continue metformin 1000 mg a day.     Hypertension.  -Continue to hold amlodipine for now.  -Restart  "hydrochlorothiazide 12.5 mg a day.  -Continue lisinopril 10 mg a day.  -Continue propranolol 20 mg twice a day.     GERD.  -Continue pantoprazole 40 mg twice a day.     History of DVT.  Drug-induced coagulation defect.  -Apixaban initially on hold due to need for above surgery.  -Restarted apixaban on 9/7.     Hyperlipidemia.  -Continue atorvastatin 40 mg a day.     Hyponatremia.  -Mild.  Sodium 134.  -Resolved.     Cirrhosis.  Nonalcoholic steatohepatitis.  -Some ascites noted during surgery.  -Had furosemide 40 mg IV once on 9/7.  -Weigh daily.  -Restart hydrochlorothiazide 12.5 mg a day.          Diet: High Consistent Carb (75 g CHO per Meal) Diet    DVT Prophylaxis: DOAC  Mccarthy Catheter: Not present  Lines: None     Cardiac Monitoring: None  Code Status: Full Code      Clinically Significant Risk Factors              # Hypoalbuminemia: Lowest albumin = 3.1 g/dL at 9/7/2024  6:35 AM, will monitor as appropriate     # Hypertension: Noted on problem list          # DMII: A1C = 6.8 % (Ref range: 0.0 - 5.6 %) within past 6 months, PRESENT ON ADMISSION  # Obesity: Estimated body mass index is 32.9 kg/m  as calculated from the following:    Height as of this encounter: 1.803 m (5' 11\").    Weight as of this encounter: 107 kg (235 lb 14.3 oz)., PRESENT ON ADMISSION                  Disposition Plan     Medically Ready for Discharge: Anticipated Tomorrow             Gordon Lerner DO  Hospitalist Service  [facility]  Securely message with [employer] (more info)  Text page via [employer] Paging/Directory   ______________________________________________________________________    Interval History   Occasional abdominal pain.  Shortness of breath improved.  Denies fevers, chills, chest pain, nausea, or vomiting.    Physical Exam   Vital Signs: Temp: 98.8  F (37.1  C) Temp src: Oral BP: 130/69 Pulse: 92   Resp: 16 SpO2: 96 % O2 Device: None (Room air) Oxygen Delivery: 2 LPM  Weight: 235 lbs 14.28 " oz    Gen:  NAD, A&Ox3.  Eyes:  PERRL, sclera anicteric.  OP:  MMM, no lesions.  Neck:  Supple.  CV:  Regular, no murmurs.  Lung:  CTA b/l, normal effort.  Ab:  +BS, soft.  Skin:  Warm, dry to touch.  No rash.  Ext:  No pitting edema LE b/l.      Medical Decision Making       40 MINUTES SPENT BY ME on the date of service doing chart review, history, exam, documentation & further activities per the note.      Data     I have personally reviewed the following data over the past 24 hrs:    9.2  \   11.1 (L)   / 239     136 99 30.2 (H) /  193 (H)   3.9 23 1.11 \       Imaging results reviewed over the past 24 hrs:   No results found for this or any previous visit (from the past 24 hour(s)).

## 2024-09-08 NOTE — PROGRESS NOTES
"[facility]   General Surgery Progress Note           Assessment and Plan:   Assessment:   Acute gangrenous cholecystitis  Sepsis due to above; fever, leukocytosis, tachypnea; resolved  Cirrhosis with ascites   H/o DVT, Apixaban therapy  -2 Days Post-Op Laparoscopic cholecystectomy, cirrhosis and ascites noted, liver hemostasis achieved with cautery and surgicel powder, drain placement; Pathology: pending  -Postoperative hypoxia, supplemental oxygen; resolved.  h/o GILA, hadn't used CPAP since losing 80 lbs.  -HGB stable at 11.1, no bloody drain output  -Afebrile      Plan:   -Pain management: add robaxin, continue frequent ice packs.  -Diet: recommend low fat diet 1-2 weeks.  -IV Rocephin while inpt  -Apixaban therapy  -Dispo: OK to discharge tomorrow per Hospitalist. DC Rx: robaxin, oxycodone.  OTC bowel program prn.  Surgery office PA will call in 2-3 weeks for postop check.  Discharge instructions were reviewed with the patient in detail.  All his questions/concerns were addressed.  He is aware that a printed copy of instructions will be given to him upon discharge.  -Please contact if needed or if questions tomorrow prior to release.         Interval History:   Feeling pretty frequent muscle spasms at right abd.  Ice packs help, hasn't had one for a while.  Avoiding narcotics; encouraged to consider this if muscle relaxant not fully helpful.  Reviewed discharge instructions.  Will place discharge rx's for tomorrow.           Physical Exam:   Blood pressure 129/67, pulse 65, temperature 98.9  F (37.2  C), temperature source Oral, resp. rate 16, height 1.803 m (5' 11\"), weight 107 kg (235 lb 14.3 oz), SpO2 95%.  I/O last 3 completed shifts:  In: 153 [P.O.:150; I.V.:3]  Out: 97 [Drains:97]  Abdomen:   Rotund, no distension. Tender at right abd and mostly at drain site.  Incisions - clean, dry, skin glue intact    [place] drain x1 @ right lateral - serous output in bulb, no bilious appearance or " blood/clots.  Drain removed without issues, dressing placed.          Data:   Pathology: pending    Recent Labs   Lab 09/08/24  0710 09/07/24  0635 09/05/24  0740   WBC 9.2 8.0 11.1*   HGB 11.1* 11.3* 11.3*   HCT 34.9* 35.4* 35.0*   MCV 88 88 88    177 156     Recent Labs   Lab 09/08/24  1201 09/08/24  0908 09/08/24  0710 09/07/24  0727 09/07/24  0635 09/05/24  0905 09/05/24  0740 09/04/24  1759 09/04/24  1243   NA  --   --  136  --  136  --  134*  --  134*   POTASSIUM  --   --  3.9  --  4.6  --  3.8  --  3.7   CHLORIDE  --   --  99  --  101  --  99  --  99   CO2  --   --  23  --  23  --  20*  --  22   ANIONGAP  --   --  14  --  12  --  15  --  13   * 193* 130*   < > 313*   < > 164*   < > 164*   BUN  --   --  30.2*  --  30.7*  --  13.9  --  16.1   CR  --   --  1.11  --  1.09  --  1.11  --  1.08   GFRESTIMATED  --   --  75  --  77  --  75  --  78   NARA  --   --  8.5*  --  8.4*  --  8.0*  --  8.7*   PROTTOTAL  --   --   --   --  6.9  --  6.7  --  7.7   ALBUMIN  --   --   --   --  3.1*  --  3.2*  --  3.9   BILITOTAL  --   --   --   --  0.4  --  1.3*  --  1.6*   ALKPHOS  --   --   --   --  64  --  53  --  61   AST  --   --   --   --   --   --  20  --  22   ALT  --   --   --   --  28  --  15  --  22    < > = values in this interval not displayed.     Recent Labs   Lab 09/04/24  1243   LIPASE 28       Ashtyn Oneill PA-C

## 2024-09-08 NOTE — PLAN OF CARE
"Goal Outcome Evaluation:      Plan of Care Reviewed With: patient    Overall Patient Progress: improvingOverall Patient Progress: improving    VSS. Independent in room. LS clear and diminished on lower lobes. Abdomen rounded, soft and tender. LEANDRA drain remove. Dressing c/d/I. No BM. Tolerating diet. BS  193, 157, and 156. Insulin given per MAR.  PIV saline locked. Rating pain 3-5/10 pain. Pain managed with scheduled pain meds.     Problem: Adult Inpatient Plan of Care  Goal: Plan of Care Review  Description: The Plan of Care Review/Shift note should be completed every shift.  The Outcome Evaluation is a brief statement about your assessment that the patient is improving, declining, or no change.  This information will be displayed automatically on your shift  note.  Outcome: Progressing  Flowsheets (Taken 9/8/2024 1725)  Plan of Care Reviewed With: patient  Overall Patient Progress: improving  Goal: Patient-Specific Goal (Individualized)  Description: You can add care plan individualizations to a care plan. Examples of Individualization might be:  \"Parent requests to be called daily at 9am for status\", \"I have a hard time hearing out of my right ear\", or \"Do not touch me to wake me up as it startles  me\".  Outcome: Progressing  Goal: Absence of Hospital-Acquired Illness or Injury  Outcome: Progressing  Intervention: Identify and Manage Fall Risk  Recent Flowsheet Documentation  Taken 9/8/2024 0915 by Savanna Ruiz, RN  Safety Promotion/Fall Prevention:   assistive device/personal items within reach   clutter free environment maintained   lighting adjusted   nonskid shoes/slippers when out of bed   patient and family education   room near nurse's station   room organization consistent   safety round/check completed  Intervention: Prevent Skin Injury  Recent Flowsheet Documentation  Taken 9/8/2024 0915 by Savanna Ruiz, RN  Body Position: position changed independently  Intervention: Prevent and Manage VTE " (Venous Thromboembolism) Risk  Recent Flowsheet Documentation  Taken 9/8/2024 0915 by Savanna Ruiz, RN  VTE Prevention/Management: (up ad tasia) SCDs off (sequential compression devices)  Intervention: Prevent Infection  Recent Flowsheet Documentation  Taken 9/8/2024 0915 by Savanna Ruiz, RN  Infection Prevention:   environmental surveillance performed   equipment surfaces disinfected   hand hygiene promoted   personal protective equipment utilized   rest/sleep promoted   single patient room provided  Goal: Optimal Comfort and Wellbeing  Outcome: Progressing  Goal: Readiness for Transition of Care  Outcome: Progressing

## 2024-09-08 NOTE — PLAN OF CARE
"Goal Outcome Evaluation:      Plan of Care Reviewed With: patient    Overall Patient Progress: improving        VS: VSS. Afebrile.   Respiratory: Lung sounds diminished in lower lobes; shallow breaths.   GI: Denies nausea. Hypoactive bowel sounds. Not passing gas per pt. Soft, tender abdomen.   : Voiding.  Skin: Lap sites and LEANDRA drain dressing clean, dry, intact.   Activity: Ambulated in halls in evening hours. Independent.   Pain: Rating pain 1-8. PRN Toradol x1 given. Using ice.   Lines: 7 mL of clear yellow/tan fluid from LEANDRA drain overnight. L PIV SL.   Plan: Pain management. LEANDRA drain management. IV antibiotics. Monitor GI status.           Problem: Adult Inpatient Plan of Care  Goal: Plan of Care Review  Description: The Plan of Care Review/Shift note should be completed every shift.  The Outcome Evaluation is a brief statement about your assessment that the patient is improving, declining, or no change.  This information will be displayed automatically on your shift  note.  Outcome: Progressing  Flowsheets (Taken 9/8/2024 5575)  Plan of Care Reviewed With: patient  Overall Patient Progress: improving  Goal: Patient-Specific Goal (Individualized)  Description: You can add care plan individualizations to a care plan. Examples of Individualization might be:  \"Parent requests to be called daily at 9am for status\", \"I have a hard time hearing out of my right ear\", or \"Do not touch me to wake me up as it startles  me\".  Outcome: Progressing  Goal: Absence of Hospital-Acquired Illness or Injury  Outcome: Progressing  Intervention: Identify and Manage Fall Risk  Recent Flowsheet Documentation  Taken 9/7/2024 2028 by Rabia Barrera RN  Safety Promotion/Fall Prevention:   assistive device/personal items within reach   clutter free environment maintained   lighting adjusted   nonskid shoes/slippers when out of bed   patient and family education   room near nurse's station   room organization consistent   safety " round/check completed  Intervention: Prevent Skin Injury  Recent Flowsheet Documentation  Taken 9/8/2024 0145 by Rabia Barrera RN  Body Position: position changed independently  Taken 9/7/2024 2028 by Rabia Barrera RN  Body Position: position changed independently  Skin Protection: adhesive use limited  Device Skin Pressure Protection:   adhesive use limited   tubing/devices free from skin contact  Intervention: Prevent and Manage VTE (Venous Thromboembolism) Risk  Recent Flowsheet Documentation  Taken 9/7/2024 2028 by Rabia Barrera RN  VTE Prevention/Management: (Up independently) SCDs off (sequential compression devices)  Intervention: Prevent Infection  Recent Flowsheet Documentation  Taken 9/7/2024 2028 by Rabia Barrera RN  Infection Prevention:   environmental surveillance performed   equipment surfaces disinfected   hand hygiene promoted   personal protective equipment utilized   rest/sleep promoted   single patient room provided  Goal: Optimal Comfort and Wellbeing  Outcome: Progressing  Intervention: Monitor Pain and Promote Comfort  Recent Flowsheet Documentation  Taken 9/8/2024 0145 by Rabia Barrera RN  Pain Management Interventions:   medication offered but refused   care clustered   emotional support   cold applied   rest   repositioned   quiet environment facilitated  Taken 9/7/2024 2044 by Rabia Barrera RN  Pain Management Interventions:   medication (see MAR)   care clustered   cold applied   emotional support   distraction   repositioned   rest  Taken 9/7/2024 2028 by Rabia Barrera RN  Pain Management Interventions:   medication (see MAR)   care clustered   cold applied   emotional support   distraction   repositioned   rest  Goal: Readiness for Transition of Care  Outcome: Progressing     Problem: Cholecystectomy  Goal: Absence of Bleeding  Outcome: Progressing  Intervention: Monitor and Manage Bleeding  Recent Flowsheet Documentation  Taken 9/7/2024 2028 by Rabia Barrera  RN  Bleeding Management: dressing monitored  Goal: Effective Bowel Elimination  Outcome: Progressing  Intervention: Enhance Bowel Motility and Elimination  Recent Flowsheet Documentation  Taken 9/7/2024 2028 by Rabia Barrera RN  Bowel Motility Enhancement:   ambulation promoted   fluid intake encouraged   oral intake encouraged  Goal: Fluid and Electrolyte Balance  Outcome: Progressing  Intervention: Monitor and Manage Fluid and Electrolyte Balance  Recent Flowsheet Documentation  Taken 9/7/2024 2028 by Rabia Barrera RN  Fluid/Electrolyte Management: fluids provided  Goal: Absence of Infection Signs and Symptoms  Outcome: Progressing  Goal: Anesthesia/Sedation Recovery  Outcome: Progressing  Intervention: Optimize Anesthesia Recovery  Recent Flowsheet Documentation  Taken 9/7/2024 2028 by Rabia Barrera RN  Safety Promotion/Fall Prevention:   assistive device/personal items within reach   clutter free environment maintained   lighting adjusted   nonskid shoes/slippers when out of bed   patient and family education   room near nurse's station   room organization consistent   safety round/check completed  Administration (IS): self-administered  Goal: Pain Control and Function  Outcome: Progressing  Intervention: Prevent or Manage Pain  Recent Flowsheet Documentation  Taken 9/8/2024 0145 by Rabia Barrera RN  Pain Management Interventions:   medication offered but refused   care clustered   emotional support   cold applied   rest   repositioned   quiet environment facilitated  Taken 9/7/2024 2044 by Rabia Barrera RN  Pain Management Interventions:   medication (see MAR)   care clustered   cold applied   emotional support   distraction   repositioned   rest  Taken 9/7/2024 2028 by Rabia Barrera RN  Pain Management Interventions:   medication (see MAR)   care clustered   cold applied   emotional support   distraction   repositioned   rest  Goal: Nausea and Vomiting Relief  Outcome: Progressing  Goal: Effective  Urinary Elimination  Outcome: Progressing  Intervention: Monitor and Manage Urinary Retention  Recent Flowsheet Documentation  Taken 9/7/2024 2028 by Rabia Barrera RN  Urinary Elimination Promotion: frequent voiding encouraged  Goal: Effective Oxygenation and Ventilation  Outcome: Progressing  Intervention: Optimize Oxygenation and Ventilation  Recent Flowsheet Documentation  Taken 9/8/2024 0145 by Rabia Barrera RN  Head of Bed (HOB) Positioning: HOB at 20-30 degrees  Taken 9/7/2024 2028 by Rabia Barrera RN  Airway/Ventilation Management:   calming measures promoted   pulmonary hygiene promoted  Head of Bed (HOB) Positioning: HOB at 20-30 degrees     Problem: Pain Acute  Goal: Optimal Pain Control and Function  Outcome: Progressing  Intervention: Develop Pain Management Plan  Recent Flowsheet Documentation  Taken 9/8/2024 0145 by Rabia Barrera RN  Pain Management Interventions:   medication offered but refused   care clustered   emotional support   cold applied   rest   repositioned   quiet environment facilitated  Taken 9/7/2024 2044 by Rabia Barrera RN  Pain Management Interventions:   medication (see MAR)   care clustered   cold applied   emotional support   distraction   repositioned   rest  Taken 9/7/2024 2028 by Rabia Barrera RN  Pain Management Interventions:   medication (see MAR)   care clustered   cold applied   emotional support   distraction   repositioned   rest  Intervention: Prevent or Manage Pain  Recent Flowsheet Documentation  Taken 9/7/2024 2028 by Rabia Barrera RN  Sensory Stimulation Regulation:   care clustered   quiet environment promoted   television on  Sleep/Rest Enhancement:   awakenings minimized   consistent schedule promoted   regular sleep/rest pattern promoted   relaxation techniques promoted  Bowel Elimination Promotion:   adequate fluid intake promoted   ambulation promoted  Medication Review/Management: medications reviewed  Intervention: Optimize Psychosocial  "Wellbeing  Recent Flowsheet Documentation  Taken 9/7/2024 2028 by Rabia Barrera RN  Supportive Measures:   active listening utilized   counseling provided   decision-making supported   goal-setting facilitated   problem-solving facilitated   relaxation techniques promoted   verbalization of feelings encouraged     Problem: Adult Inpatient Plan of Care  Goal: Plan of Care Review  Description: The Plan of Care Review/Shift note should be completed every shift.  The Outcome Evaluation is a brief statement about your assessment that the patient is improving, declining, or no change.  This information will be displayed automatically on your shift  note.  Outcome: Progressing  Flowsheets (Taken 9/8/2024 0451)  Plan of Care Reviewed With: patient  Overall Patient Progress: improving  Goal: Patient-Specific Goal (Individualized)  Description: You can add care plan individualizations to a care plan. Examples of Individualization might be:  \"Parent requests to be called daily at 9am for status\", \"I have a hard time hearing out of my right ear\", or \"Do not touch me to wake me up as it startles  me\".  Outcome: Progressing  Goal: Absence of Hospital-Acquired Illness or Injury  Outcome: Progressing  Intervention: Identify and Manage Fall Risk  Recent Flowsheet Documentation  Taken 9/7/2024 2028 by Rabia Barrera, RN  Safety Promotion/Fall Prevention:   assistive device/personal items within reach   clutter free environment maintained   lighting adjusted   nonskid shoes/slippers when out of bed   patient and family education   room near nurse's station   room organization consistent   safety round/check completed  Intervention: Prevent Skin Injury  Recent Flowsheet Documentation  Taken 9/8/2024 0145 by Rabia Barrera, RN  Body Position: position changed independently  Taken 9/7/2024 2028 by Rabia Barrera RN  Body Position: position changed independently  Skin Protection: adhesive use limited  Device Skin Pressure Protection:   " adhesive use limited   tubing/devices free from skin contact  Intervention: Prevent and Manage VTE (Venous Thromboembolism) Risk  Recent Flowsheet Documentation  Taken 9/7/2024 2028 by Rabia Barrera RN  VTE Prevention/Management: (Up independently) SCDs off (sequential compression devices)  Intervention: Prevent Infection  Recent Flowsheet Documentation  Taken 9/7/2024 2028 by Rabia Barrera RN  Infection Prevention:   environmental surveillance performed   equipment surfaces disinfected   hand hygiene promoted   personal protective equipment utilized   rest/sleep promoted   single patient room provided  Goal: Optimal Comfort and Wellbeing  Outcome: Progressing  Intervention: Monitor Pain and Promote Comfort  Recent Flowsheet Documentation  Taken 9/8/2024 0145 by Rabia Barrera RN  Pain Management Interventions:   medication offered but refused   care clustered   emotional support   cold applied   rest   repositioned   quiet environment facilitated  Taken 9/7/2024 2044 by Rabia Barrera RN  Pain Management Interventions:   medication (see MAR)   care clustered   cold applied   emotional support   distraction   repositioned   rest  Taken 9/7/2024 2028 by Rabia Barrera RN  Pain Management Interventions:   medication (see MAR)   care clustered   cold applied   emotional support   distraction   repositioned   rest  Goal: Readiness for Transition of Care  Outcome: Progressing     Problem: Cholecystectomy  Goal: Absence of Bleeding  Outcome: Progressing  Intervention: Monitor and Manage Bleeding  Recent Flowsheet Documentation  Taken 9/7/2024 2028 by Rabia Barrera RN  Bleeding Management: dressing monitored  Goal: Effective Bowel Elimination  Outcome: Progressing  Intervention: Enhance Bowel Motility and Elimination  Recent Flowsheet Documentation  Taken 9/7/2024 2028 by Rabia Barrera RN  Bowel Motility Enhancement:   ambulation promoted   fluid intake encouraged   oral intake encouraged  Goal: Fluid and  Electrolyte Balance  Outcome: Progressing  Intervention: Monitor and Manage Fluid and Electrolyte Balance  Recent Flowsheet Documentation  Taken 9/7/2024 2028 by Rabia Barrera RN  Fluid/Electrolyte Management: fluids provided  Goal: Absence of Infection Signs and Symptoms  Outcome: Progressing  Goal: Anesthesia/Sedation Recovery  Outcome: Progressing  Intervention: Optimize Anesthesia Recovery  Recent Flowsheet Documentation  Taken 9/7/2024 2028 by Rabia Barrera RN  Safety Promotion/Fall Prevention:   assistive device/personal items within reach   clutter free environment maintained   lighting adjusted   nonskid shoes/slippers when out of bed   patient and family education   room near nurse's station   room organization consistent   safety round/check completed  Administration (IS): self-administered  Goal: Pain Control and Function  Outcome: Progressing  Intervention: Prevent or Manage Pain  Recent Flowsheet Documentation  Taken 9/8/2024 0145 by Rabia Barrera RN  Pain Management Interventions:   medication offered but refused   care clustered   emotional support   cold applied   rest   repositioned   quiet environment facilitated  Taken 9/7/2024 2044 by Rabia Barrera RN  Pain Management Interventions:   medication (see MAR)   care clustered   cold applied   emotional support   distraction   repositioned   rest  Taken 9/7/2024 2028 by Rabia Barrera RN  Pain Management Interventions:   medication (see MAR)   care clustered   cold applied   emotional support   distraction   repositioned   rest  Goal: Nausea and Vomiting Relief  Outcome: Progressing  Goal: Effective Urinary Elimination  Outcome: Progressing  Intervention: Monitor and Manage Urinary Retention  Recent Flowsheet Documentation  Taken 9/7/2024 2028 by Rabia Barrera RN  Urinary Elimination Promotion: frequent voiding encouraged  Goal: Effective Oxygenation and Ventilation  Outcome: Progressing  Intervention: Optimize Oxygenation and  Ventilation  Recent Flowsheet Documentation  Taken 9/8/2024 0145 by Rabia Barrera RN  Head of Bed (HOB) Positioning: HOB at 20-30 degrees  Taken 9/7/2024 2028 by Rabia Barrera RN  Airway/Ventilation Management:   calming measures promoted   pulmonary hygiene promoted  Head of Bed (HOB) Positioning: HOB at 20-30 degrees     Problem: Pain Acute  Goal: Optimal Pain Control and Function  Outcome: Progressing  Intervention: Develop Pain Management Plan  Recent Flowsheet Documentation  Taken 9/8/2024 0145 by Rabia Barrera RN  Pain Management Interventions:   medication offered but refused   care clustered   emotional support   cold applied   rest   repositioned   quiet environment facilitated  Taken 9/7/2024 2044 by Rabia Barrera RN  Pain Management Interventions:   medication (see MAR)   care clustered   cold applied   emotional support   distraction   repositioned   rest  Taken 9/7/2024 2028 by Rabia Barrera RN  Pain Management Interventions:   medication (see MAR)   care clustered   cold applied   emotional support   distraction   repositioned   rest  Intervention: Prevent or Manage Pain  Recent Flowsheet Documentation  Taken 9/7/2024 2028 by Rabia Barrera RN  Sensory Stimulation Regulation:   care clustered   quiet environment promoted   television on  Sleep/Rest Enhancement:   awakenings minimized   consistent schedule promoted   regular sleep/rest pattern promoted   relaxation techniques promoted  Bowel Elimination Promotion:   adequate fluid intake promoted   ambulation promoted  Medication Review/Management: medications reviewed  Intervention: Optimize Psychosocial Wellbeing  Recent Flowsheet Documentation  Taken 9/7/2024 2028 by Rabia Barrera RN  Supportive Measures:   active listening utilized   counseling provided   decision-making supported   goal-setting facilitated   problem-solving facilitated   relaxation techniques promoted   verbalization of feelings encouraged

## 2024-09-09 VITALS
DIASTOLIC BLOOD PRESSURE: 72 MMHG | RESPIRATION RATE: 16 BRPM | HEART RATE: 66 BPM | HEIGHT: 71 IN | WEIGHT: 234.9 LBS | BODY MASS INDEX: 32.89 KG/M2 | OXYGEN SATURATION: 96 % | TEMPERATURE: 98.7 F | SYSTOLIC BLOOD PRESSURE: 142 MMHG

## 2024-09-09 LAB
ANION GAP SERPL CALCULATED.3IONS-SCNC: 12 MMOL/L (ref 7–15)
BUN SERPL-MCNC: 29.6 MG/DL (ref 8–23)
CALCIUM SERPL-MCNC: 8.2 MG/DL (ref 8.8–10.4)
CHLORIDE SERPL-SCNC: 101 MMOL/L (ref 98–107)
CREAT SERPL-MCNC: 1.03 MG/DL (ref 0.67–1.17)
EGFRCR SERPLBLD CKD-EPI 2021: 82 ML/MIN/1.73M2
GLUCOSE BLDC GLUCOMTR-MCNC: 125 MG/DL (ref 70–99)
GLUCOSE BLDC GLUCOMTR-MCNC: 167 MG/DL (ref 70–99)
GLUCOSE SERPL-MCNC: 305 MG/DL (ref 70–99)
HCO3 SERPL-SCNC: 22 MMOL/L (ref 22–29)
PATH REPORT.COMMENTS IMP SPEC: NORMAL
PATH REPORT.COMMENTS IMP SPEC: NORMAL
PATH REPORT.FINAL DX SPEC: NORMAL
PATH REPORT.GROSS SPEC: NORMAL
PATH REPORT.MICROSCOPIC SPEC OTHER STN: NORMAL
PATH REPORT.RELEVANT HX SPEC: NORMAL
PHOTO IMAGE: NORMAL
POTASSIUM SERPL-SCNC: 4.8 MMOL/L (ref 3.4–5.3)
SODIUM SERPL-SCNC: 135 MMOL/L (ref 135–145)

## 2024-09-09 PROCEDURE — 250N000013 HC RX MED GY IP 250 OP 250 PS 637: Performed by: SURGERY

## 2024-09-09 PROCEDURE — 250N000013 HC RX MED GY IP 250 OP 250 PS 637: Performed by: INTERNAL MEDICINE

## 2024-09-09 PROCEDURE — 250N000011 HC RX IP 250 OP 636: Performed by: INTERNAL MEDICINE

## 2024-09-09 PROCEDURE — 99239 HOSP IP/OBS DSCHRG MGMT >30: CPT | Performed by: INTERNAL MEDICINE

## 2024-09-09 PROCEDURE — 250N000013 HC RX MED GY IP 250 OP 250 PS 637: Performed by: PHYSICIAN ASSISTANT

## 2024-09-09 RX ORDER — AMOXICILLIN 250 MG
1 CAPSULE ORAL 2 TIMES DAILY PRN
Qty: 30 TABLET | Refills: 0 | Status: SHIPPED | OUTPATIENT
Start: 2024-09-09

## 2024-09-09 RX ORDER — FUROSEMIDE 10 MG/ML
40 INJECTION INTRAMUSCULAR; INTRAVENOUS ONCE
Status: COMPLETED | OUTPATIENT
Start: 2024-09-09 | End: 2024-09-09

## 2024-09-09 RX ADMIN — METFORMIN ER 500 MG 1000 MG: 500 TABLET ORAL at 08:09

## 2024-09-09 RX ADMIN — GABAPENTIN 400 MG: 300 CAPSULE ORAL at 14:57

## 2024-09-09 RX ADMIN — PANTOPRAZOLE SODIUM 40 MG: 40 TABLET, DELAYED RELEASE ORAL at 08:08

## 2024-09-09 RX ADMIN — SENNOSIDES AND DOCUSATE SODIUM 2 TABLET: 8.6; 5 TABLET ORAL at 08:28

## 2024-09-09 RX ADMIN — METHOCARBAMOL 500 MG: 500 TABLET ORAL at 14:57

## 2024-09-09 RX ADMIN — PROPRANOLOL HYDROCHLORIDE 20 MG: 20 TABLET ORAL at 08:08

## 2024-09-09 RX ADMIN — CALCIUM 500 MG: 500 TABLET ORAL at 08:08

## 2024-09-09 RX ADMIN — HYDROCHLOROTHIAZIDE 12.5 MG: 12.5 CAPSULE ORAL at 08:08

## 2024-09-09 RX ADMIN — FUROSEMIDE 40 MG: 10 INJECTION, SOLUTION INTRAMUSCULAR; INTRAVENOUS at 11:29

## 2024-09-09 RX ADMIN — LISINOPRIL 10 MG: 5 TABLET ORAL at 08:08

## 2024-09-09 RX ADMIN — APIXABAN 5 MG: 5 TABLET, FILM COATED ORAL at 08:08

## 2024-09-09 RX ADMIN — METHOCARBAMOL 500 MG: 500 TABLET ORAL at 08:08

## 2024-09-09 RX ADMIN — FERROUS SULFATE TAB 325 MG (65 MG ELEMENTAL FE) 325 MG: 325 (65 FE) TAB at 08:09

## 2024-09-09 RX ADMIN — GABAPENTIN 400 MG: 300 CAPSULE ORAL at 08:08

## 2024-09-09 ASSESSMENT — ACTIVITIES OF DAILY LIVING (ADL)
ADLS_ACUITY_SCORE: 22

## 2024-09-09 NOTE — PLAN OF CARE
BP elevated.  Nubia reg diet but hypoactive BS and denies passing of flatus.  Encouraged to get of of bed and ambulate.  Incisions c/d/I.  Ice to abd and robaxin helping to keep comfortable; declining other pain meds.  Cont with plan of care.

## 2024-09-09 NOTE — PROGRESS NOTES
Patient discharged to home in VS condition with significant other.  Discharge teaching done.  Medications  sent with patient.  All discharge goals met. Care plan was completed prior to my shift.

## 2024-09-09 NOTE — DISCHARGE SUMMARY
"[facility]  Hospitalist Discharge Summary      Date of Admission:  9/4/2024  Date of Discharge:  9/9/2024  Discharging Provider: Gordon Lerner DO  Discharge Service: Hospitalist Service    Discharge Diagnoses   Acute gangrenous cholecystitis.  Sepsis due to cholecystitis.  Status post cholecystectomy.  Acute hypoxic respiratory failure.  Diabetes mellitus type 2.  Chronic diabetic left foot ulcer.  Diabetic neuropathy.  Hypertension.  GERD.  History of DVT.  Drug-induced coagulation defect.    Hyperlipidemia.  Hyponatremia.  Cirrhosis.  Nonalcoholic steatohepatitis.      Clinically Significant Risk Factors     # DMII: A1C = 6.8 % (Ref range: 0.0 - 5.6 %) within past 6 months  # Obesity: Estimated body mass index is 32.76 kg/m  as calculated from the following:    Height as of this encounter: 1.803 m (5' 11\").    Weight as of this encounter: 106.5 kg (234 lb 14.4 oz).       Follow-ups Needed After Discharge   Follow-up Appointments     Follow-up and recommended labs and tests       Follow up with primary care provider, Johann Serna, within 14 days for   hospital follow- up.  The following labs/tests are recommended: CBC and   BMP within 14 days.  Follow-up with general surgery within 3 weeks.            Discharge Disposition   Discharged to home  Condition at discharge: Stable    Hospital Course     Crispin Rojas is a 64 year old male with Pmhx of DVT on apixaban, CVA, essential hypertension, hypercholesteremia, T2DM, peripheral vascular disease, obstructive sleep apnea, SNYDER Cirrhosis, who was admitted on 9/4/2024 with acute cholecystitis.     In the ED temp 99.5F, VSS. Lab work with neutrophilic leukocytosis, CMP with borderline hyponatremia, total bilirubin of 1.6 with normal AST, ALT, alk phos.  No lactic acidosis.  Upper quadrant ultrasound with cholelithiasis, gallbladder wall thickening concerning for acute versus chronic cholecystitis.  No biliary dilation, common duct " measuring 4 mm.  Given Rocephin in the ED.  Admission was requested from hospitalist service for further cares and monitoring.  General surgery was contacted from the emergency department who did not recommend cholecystectomy from the emergency department due to anticoagulation.  DOAC initially on hold.  Seen in consultation by general surgery.  Went to the operating room on 9/6 for a cholecystectomy.  Has now completed a 5-day course of ceftriaxone.  Does have a history of diabetes mellitus type 2.  Would continue to hold semaglutide at time of discharge until follow-up with primary care provider.  Did appear to be mildly fluid overloaded after surgery.  Required supplemental oxygen for a short time.  Given diuresis with IV furosemide.  No longer requiring supplemental oxygen by discharge.  Fluid status appears back to baseline.  Does have chronic cirrhosis.  Should continue to follow with his primary care provider.  Follow-up with PCP in approximately 2 weeks.  Recheck CBC and BMP at that time.  Follow-up with surgery within 3 weeks.    Consultations This Hospital Stay   SURGERY GENERAL IP CONSULT  CARE MANAGEMENT / SOCIAL WORK IP CONSULT    Code Status   Full Code    Time Spent on this Encounter   I spent 40 minutes with Mr. Moscoso and working on discharge on 9/9/2024.       Gordon Lerner [facility]  [address] [place] [address]  Phone: [phone]  Fax: [phone]  ______________________________________________________________________    Physical Exam   Vital Signs: Temp: 98.5  F (36.9  C) Temp src: Oral BP: (!) 142/72 Pulse: 59   Resp: 16 SpO2: 93 % O2 Device: None (Room air)    Weight: 234 lbs 14.4 oz  Gen:  NAD, A&Ox3.  Eyes:  PERRL, sclera anicteric.  OP:  MMM, no lesions.  Neck:  Supple.  CV:  Regular, no murmurs.  Lung:  CTA b/l, normal effort.  Ab:  +BS, soft.  Skin:  Warm, dry to touch.  No rash.  Ext:  No pitting edema LE b/l.         Primary Care  Physician   Johann Serna    Discharge Orders      Reason for your hospital stay    Acute cholecystitis.     Follow-up and recommended labs and tests     Follow up with primary care provider, Johann Serna, within 14 days for hospital follow- up.  The following labs/tests are recommended: CBC and BMP within 14 days.  Follow-up with general surgery within 3 weeks.     Activity    Your activity upon discharge: activity as tolerated     Diet    Follow this diet upon discharge: Regular           Discharge Medications   Current Discharge Medication List        START taking these medications    Details   methocarbamol (ROBAXIN) 500 MG tablet Take 1 tablet (500 mg) by mouth 3 times daily.  Qty: 30 tablet, Refills: 0    Associated Diagnoses: S/P laparoscopic cholecystectomy      oxyCODONE (ROXICODONE) 5 MG tablet Take 0.5-1 tablets (2.5-5 mg) by mouth every 4 hours as needed for moderate pain.  Qty: 6 tablet, Refills: 0    Associated Diagnoses: S/P laparoscopic cholecystectomy      senna-docusate (SENOKOT-S/PERICOLACE) 8.6-50 MG tablet Take 1 tablet by mouth 2 times daily as needed for constipation.  Qty: 30 tablet, Refills: 0    Associated Diagnoses: S/P laparoscopic cholecystectomy           CONTINUE these medications which have NOT CHANGED    Details   amLODIPine (NORVASC) 5 MG tablet Take 2 tablets (10 mg) by mouth daily  Qty: 90 tablet, Refills: 4    Associated Diagnoses: Benign essential hypertension      atorvastatin (LIPITOR) 40 MG tablet Take 40 mg by mouth at bedtime      calcium carbonate (OS-NARA) 500 MG tablet Take 1 tablet by mouth 2 times daily      Co-Enzyme Q10 100 MG CAPS Take 100 mg by mouth daily       ELIQUIS ANTICOAGULANT 5 MG tablet Take 1 tablet (5 mg) by mouth 2 times daily  Qty: 180 tablet, Refills: 4    Associated Diagnoses: Personal history of DVT (deep vein thrombosis)      ferrous sulfate (FEROSUL) 325 (65 Fe) MG tablet Take 325 mg by mouth daily (with breakfast)      gabapentin (NEURONTIN) 100  MG capsule Take 400 mg by mouth 3 times daily      hydrochlorothiazide (MICROZIDE) 12.5 MG capsule Take 1 capsule (12.5 mg) by mouth every morning  Qty: 90 capsule, Refills: 4    Associated Diagnoses: Benign essential hypertension      insulin aspart (NOVOLOG PEN) 100 UNIT/ML pen Inject subcutaneously 3 times daily (with meals). Inject 1 unit of insulin for every 7 grams of carbs      insulin degludec (TRESIBA) 100 UNIT/ML pen Inject 35-55 Units Subcutaneous every morning      ketoconazole (NIZORAL) 2 % external shampoo Apply topically twice a week On Tuesday and Friday      lisinopril (ZESTRIL) 40 MG tablet Take 1 tablet (40 mg) by mouth daily  Qty: 90 tablet, Refills: 4    Associated Diagnoses: Benign essential hypertension      magnesium gluconate (MAGONATE) 500 MG tablet Take 500 mg by mouth 3 times daily      metFORMIN (GLUCOPHAGE XR) 500 MG 24 hr tablet TAKE 2 TABLETS(1000 MG) BY MOUTH TWICE DAILY  Qty: 360 tablet, Refills: 0    Associated Diagnoses: Type 2 diabetes mellitus with peripheral neuropathy (H)      Multiple Vitamins-Minerals (MULTIVITAMIN PO) Take 1 tablet by mouth daily       omeprazole (PRILOSEC) 40 MG DR capsule Take 40 mg by mouth daily      propranolol (INDERAL) 20 MG tablet Take 20 mg by mouth 2 times daily      acetaminophen (TYLENOL) 325 MG tablet Take 3 tablets (975 mg) by mouth every 6 hours as needed for mild pain or other (and adjunct with moderate or severe pain or per patient request)  Qty: 100 tablet, Refills: 0    Associated Diagnoses: Dog bite of hand, right, initial encounter; Cellulitis of right upper extremity      Continuous Glucose Sensor (FREESTYLE LUCERO 14 DAY SENSOR) MISC every 14 days.      gentamicin (GARAMYCIN) 0.1 % external ointment Apply topically as needed      tadalafil (CIALIS) 5 MG tablet TAKE 1 TABLET BY MOUTH EVERY DAY AS NEEDED one hour before intercourse  Qty: 30 tablet, Refills: 1    Associated Diagnoses: Erectile dysfunction due to diseases classified  elsewhere           STOP taking these medications       Cholecalciferol (VITAMIN D3) 25 MCG (1000 UT) CAPS Comments:   Reason for Stopping:         Semaglutide, 2 MG/DOSE, (OZEMPIC, 2 MG/DOSE,) 8 MG/3ML pen Comments:   Reason for Stopping:             Allergies   Allergies   Allergen Reactions    Bactrim [Sulfamethoxazole-Trimethoprim] Nausea and Vomiting    Statins Swelling     Other reaction(s): Other (see comments)  SIMCOR caused edema in extremities  Only Simvastatin    Sulfatolamide Nausea and Vomiting    Niacin Swelling     SIMCOR caused edema in extremities    Simvastatin Swelling     SIMCOR caused edema in extremities    Sulfa Antibiotics Nausea

## 2024-09-09 NOTE — PLAN OF CARE
"Goal Outcome Evaluation:      Plan of Care Reviewed With: patient    Overall Patient Progress: improvingOverall Patient Progress: improving  A&Ox4. VSS. C/o pain but declines meds. No BM or flatus. Pt ambulated in room and up in chair for few hours. PIV SL. .   Problem: Adult Inpatient Plan of Care  Goal: Plan of Care Review  Description: The Plan of Care Review/Shift note should be completed every shift.  The Outcome Evaluation is a brief statement about your assessment that the patient is improving, declining, or no change.  This information will be displayed automatically on your shift  note.  Outcome: Adequate for Care Transition  Flowsheets (Taken 9/9/2024 0234)  Plan of Care Reviewed With: patient  Overall Patient Progress: improving  Goal: Patient-Specific Goal (Individualized)  Description: You can add care plan individualizations to a care plan. Examples of Individualization might be:  \"Parent requests to be called daily at 9am for status\", \"I have a hard time hearing out of my right ear\", or \"Do not touch me to wake me up as it startles  me\".  Outcome: Adequate for Care Transition  Goal: Absence of Hospital-Acquired Illness or Injury  Outcome: Adequate for Care Transition  Intervention: Identify and Manage Fall Risk  Recent Flowsheet Documentation  Taken 9/8/2024 2327 by Elizabet Wyatt RN  Safety Promotion/Fall Prevention:   clutter free environment maintained   safety round/check completed  Intervention: Prevent Skin Injury  Recent Flowsheet Documentation  Taken 9/8/2024 2327 by Elizabet Wyatt RN  Body Position: position changed independently  Intervention: Prevent and Manage VTE (Venous Thromboembolism) Risk  Recent Flowsheet Documentation  Taken 9/8/2024 2327 by Elizabet Wyatt RN  VTE Prevention/Management: SCDs off (sequential compression devices)  Intervention: Prevent Infection  Recent Flowsheet Documentation  Taken 9/8/2024 2327 by Elizabet Wyatt RN  Infection Prevention:   single " patient room provided   hand hygiene promoted  Goal: Optimal Comfort and Wellbeing  Outcome: Adequate for Care Transition  Intervention: Monitor Pain and Promote Comfort  Recent Flowsheet Documentation  Taken 9/8/2024 2327 by Elizabet Wyatt RN  Pain Management Interventions:   declines   cold applied  Goal: Readiness for Transition of Care  Outcome: Adequate for Care Transition     Problem: Cholecystectomy  Goal: Absence of Bleeding  Outcome: Adequate for Care Transition  Goal: Effective Bowel Elimination  Outcome: Adequate for Care Transition  Goal: Fluid and Electrolyte Balance  Outcome: Adequate for Care Transition  Goal: Absence of Infection Signs and Symptoms  Outcome: Adequate for Care Transition  Goal: Anesthesia/Sedation Recovery  Outcome: Adequate for Care Transition  Intervention: Optimize Anesthesia Recovery  Recent Flowsheet Documentation  Taken 9/8/2024 2327 by Elizabet Wyatt RN  Safety Promotion/Fall Prevention:   clutter free environment maintained   safety round/check completed  Administration (IS): self-administered  Level Incentive Spirometer (mL): 1000  Incentive Spirometer Predicted Level (mL): 500  Number of Repetitions (IS): 3  Goal: Pain Control and Function  Outcome: Adequate for Care Transition  Intervention: Prevent or Manage Pain  Recent Flowsheet Documentation  Taken 9/8/2024 2327 by Elizabet Wyatt RN  Pain Management Interventions:   declines   cold applied  Goal: Nausea and Vomiting Relief  Outcome: Adequate for Care Transition  Goal: Effective Urinary Elimination  Outcome: Adequate for Care Transition  Goal: Effective Oxygenation and Ventilation  Outcome: Adequate for Care Transition     Problem: Pain Acute  Goal: Optimal Pain Control and Function  Outcome: Adequate for Care Transition  Intervention: Develop Pain Management Plan  Recent Flowsheet Documentation  Taken 9/8/2024 2327 by Elizabet Wyatt RN  Pain Management Interventions:   declines   cold applied

## 2024-09-10 ENCOUNTER — TELEPHONE (OUTPATIENT)
Dept: SURGERY | Facility: CLINIC | Age: 64
End: 2024-09-10
Payer: COMMERCIAL

## 2024-09-10 NOTE — TELEPHONE ENCOUNTER
Name of caller: Patient    Reason for Call:  Discharge summary says t stop taking Vit D and Ozempic, for how long should I stop these?     Surgeon:  Dr. Leung    Recent Surgery:  Yes.    If yes, when & what type:  acute gangrenous cholecystitis 9/6      Best phone number to reach pt at is: [phone]  Ok to leave a message with medical info? Yes.    Pharmacy preferred (if calling for a refill): N/A

## 2024-09-10 NOTE — TELEPHONE ENCOUNTER
Surgeon:  Dr. Leung  Recent Surgery:  acute gangrenous cholecystitis  Date: 9/6    Returned call to Pat.    Last dose of Ozempic was 8/28.    He reports his discharge summary stated to stop his vitamin D and Ozempic however there was not a reason listed.  It does not say how long he should stay off of these medications either.    I advised the patient to send a message to his PCP to inquire how long he should be off of these two medications.    Advised patient that if need be, his provider can re-titrate up his Ozempic from a lower dose if it has been > 2 weeks since last dose.    He expressed understanding of recommendation.    Vesta YANES RN

## 2024-09-11 ENCOUNTER — PATIENT OUTREACH (OUTPATIENT)
Dept: CARE COORDINATION | Facility: CLINIC | Age: 64
End: 2024-09-11

## 2024-09-11 ENCOUNTER — OFFICE VISIT (OUTPATIENT)
Dept: PODIATRY | Facility: CLINIC | Age: 64
End: 2024-09-11
Payer: COMMERCIAL

## 2024-09-11 VITALS
SYSTOLIC BLOOD PRESSURE: 138 MMHG | WEIGHT: 237 LBS | DIASTOLIC BLOOD PRESSURE: 74 MMHG | BODY MASS INDEX: 33.18 KG/M2 | HEIGHT: 71 IN

## 2024-09-11 DIAGNOSIS — Z89.432 S/P TRANSMETATARSAL AMPUTATION OF FOOT, LEFT (H): Primary | ICD-10-CM

## 2024-09-11 DIAGNOSIS — E11.621 DIABETIC ULCER OF OTHER PART OF LEFT FOOT ASSOCIATED WITH TYPE 2 DIABETES MELLITUS, WITH FAT LAYER EXPOSED (H): ICD-10-CM

## 2024-09-11 DIAGNOSIS — L97.522 DIABETIC ULCER OF OTHER PART OF LEFT FOOT ASSOCIATED WITH TYPE 2 DIABETES MELLITUS, WITH FAT LAYER EXPOSED (H): ICD-10-CM

## 2024-09-11 DIAGNOSIS — Z98.890 S/P FOOT SURGERY, RIGHT: ICD-10-CM

## 2024-09-11 PROCEDURE — 99024 POSTOP FOLLOW-UP VISIT: CPT | Performed by: PODIATRIST

## 2024-09-11 NOTE — LETTER
9/11/2024      Crispin Rojas  [address] [facility] [address]      Dear Colleague,    Thank you for referring your patient, Crispin Rojas, to the [facility]. Please see a copy of my visit note below.     [facility]  CLINIC NOTE    CHIEF COMPLAINT:  left foot    PATIENT HISTORY:  Crispin Rojas is a 64 year old male  who presents for follow up for his left foot. Had a TMA on 6/18/2024. Site healed uneventfully. He was also seen in the hospital when he was admitted for a hand infection. At that time, there was no open lesions to his left foot, but there was a small callus to the plantar first metatarsal. He was advised of this and to slowly increase activity. Since then he hiked three miles and states the submet one site started draining. States now the site has been dry. Is now back to work. Denies pain to the site. Denies N/F/V/C/D.   9/11: Follows up for his left foot. States no new issues to the foot and has been doing well. States the ulcer on the plantar site is totally healed. Was recently admitted for cholecystitis and had his gallbladder removed. Having ongoing abdominal pain which he states is improving. Currently, not wearing dressings on his feet. Is getting closer to full activity. Has CMO in tennis shoe.         Review of Systems:  A 10 point review of systems was performed and is positive for that noted above in the patient history.  All other areas are negative.     PAST MEDICAL HISTORY:   Past Medical History:   Diagnosis Date     Antiplatelet or antithrombotic long-term use      Ascending aorta dilatation (H24)      Cerebral artery occlusion with cerebral infarction (H)      Cerebral infarction (H)     2010     Gastroesophageal reflux disease with esophagitis      H/O blood clots 2022     Hyperlipidemia LDL goal <70      Hypertension      Nonalcoholic steatohepatitis 11/30/2022     Numbness and tingling      Obesity      GILA  (obstructive sleep apnea) 10/22/2018    CPAP intolerant     PVD (peripheral vascular disease) (H24)     s/p left partial toe amputation     Renal stone      Tremor      Type 2 diabetes mellitus with diabetic polyneuropathy, with long-term current use of insulin (H)         PAST SURGICAL HISTORY:   Past Surgical History:   Procedure Laterality Date     AMPUTATE FOOT Left 8/18/2016    Procedure: AMPUTATE FOOT;  Surgeon: Jack Younger DPM;  Location: [facility]     AMPUTATE TOE(S) Right 2/1/2016    Procedure: AMPUTATE TOE(S);  Surgeon: Rachelle Manriquez DPM, Pod;  Location: [facility]     AMPUTATE TOE(S) Right 8/2/2019    Procedure: Right fourth toe partial amputation for treatment of osteomyelitis.;  Surgeon: Jack Younger DPM;  [facility]: [facility]     AMPUTATE TOE(S) Left 11/8/2019    Procedure: Left second toe amputation at the metatarsophalangeal joint.;  Surgeon: Rachelle Manriquez DPM, Podiatry/Foot and Ankle Surgery;  Location: [facility]     AMPUTATE TOE(S) Right 6/5/2022    Procedure: AMPUTATION OF RIGHT GREAT TOE;  Surgeon: Wili Quiles DPM;  [facility]: [facility]     AMPUTATE TOE(S) Right 7/28/2022    Procedure: Right foot partial first metatarsal resection;  Surgeon: Tiny Soto DPM;  [facility]     AMPUTATE TOE(S) Left 6/18/2024    Procedure: Left foot transmetatarsal amputation;  Surgeon: Tiny Soto DPM;  Location: [facility]     ANGIOGRAM       BIOPSY BONE FOOT Right 7/24/2022    Procedure: Bone biopsy, right first metatarsal.;  Surgeon: Valentino Molina DPM;  Location: [facility]     COLONOSCOPY       COMBINED CYSTOSCOPY, RETROGRADES, URETEROSCOPY, INSERT STENT Left 8/21/2016    Procedure: COMBINED CYSTOSCOPY, RETROGRADES, URETEROSCOPY, INSERT STENT;  Surgeon: Artemio Valenzuela MD;  Location[facility]     COMBINED CYSTOSCOPY, RETROGRADES, URETEROSCOPY, LASER HOLMIUM LITHOTRIPSY URETER(S), INSERT STENT Left 10/3/2016    Procedure: COMBINED CYSTOSCOPY, RETROGRADES, URETEROSCOPY, LASER HOLMIUM  LITHOTRIPSY URETER(S), INSERT STENT;  Surgeon: Artemio Valenzuela MD;  [facility]     EXTRACORPOREAL SHOCK WAVE LITHOTRIPSY (ESWL) Left 9/8/2016    Procedure: EXTRACORPOREAL SHOCK WAVE LITHOTRIPSY (ESWL);  Surgeon: Artemio Valenzuela MD;  Location: [facility]     INCISION AND DRAINAGE FOOT, COMBINED Right 7/24/2022    Procedure: Incision and drainage, right foot ;  Surgeon: Valentino Molina DPM;  [facility]: [facility]     IRRIGATION AND DEBRIDEMENT FOOT, COMBINED Left 10/19/2023    Procedure: Left foot second metatarsal resection , . Left plantar ulcer excisional debridement, down to and including tendon, with closure, site measuring 4 cm x 2 cm x 1 cm;  Surgeon: Tiny Soto DPM;  [facility]: [facility]     IRRIGATION AND DEBRIDEMENT HAND, COMBINED Right 7/30/2024    Procedure: Irrigation and Debridement of Right Dorsal Hand and wrist;  Surgeon: June Mcdaniels MD;  [facility]: [facility]     LAPAROSCOPIC CHOLECYSTECTOMY N/A 9/6/2024    Procedure: Laparoscopic cholecystectomy;  Surgeon: Aakash Meraz MD;  [facility]: [facility]     OSTEOTOMY FOOT Right 11/30/2022    Procedure: 1. Right second metatarsal floating osteotomy  2. Right second digit proximal phalanx arthroplasty 3. Right percutaneous flexor tenotomy;  Surgeon: Tiny Soto DPM;  [facility]     OSTEOTOMY FOOT Right 1/19/2023    Procedure: Right foot third metatarsal head excision, ulcer debridement, matatarsal osteotomies;  Surgeon: Tiny Soto DPM;  Location: [facility]     RECESSION GASTROCNEMIUS Right 2/1/2016    Procedure: RECESSION GASTROCNEMIUS;  Surgeon: Rachelle Manriquez DPM, Pod;  [facility]: [facility]        MEDICATIONS:  Reviewed in Epic.     ALLERGIES:    Allergies   Allergen Reactions     Bactrim [Sulfamethoxazole-Trimethoprim] Nausea and Vomiting     Statins Swelling     Other reaction(s): Other (see comments)  SIMCOR caused edema in extremities  Only Simvastatin     Sulfatolamide Nausea and Vomiting     Niacin  Swelling     SIMCOR caused edema in extremities     Simvastatin Swelling     SIMCOR caused edema in extremities     Sulfa Antibiotics Nausea        SOCIAL HISTORY:   Social History     Socioeconomic History     Marital status:      Spouse name: Sydney     Number of children: 2     Years of education: Not on file     Highest education level: Master's degree (e.g., MA, MS, [place], MEd, MSW, ELIANA)   Occupational History     Occupation: marketing     Employer: [employer]     Comment: [employer]   Tobacco Use     Smoking status: Never     Smokeless tobacco: Never   Vaping Use     Vaping status: Never Used   Substance and Sexual Activity     Alcohol use: Yes     Comment: rare---red wine 3x per month     Drug use: Never     Sexual activity: Not Currently     Partners: Female   Other Topics Concern     Parent/sibling w/ CABG, MI or angioplasty before 65F 55M? No   Social History Narrative     Not on file     Social Determinants of Health     Financial Resource Strain: Low Risk  (9/4/2024)    Financial Resource Strain      Within the past 12 months, have you or your family members you live with been unable to get utilities (heat, electricity) when it was really needed?: No   Food Insecurity: Low Risk  (9/4/2024)    Food Insecurity      Within the past 12 months, did you worry that your food would run out before you got money to buy more?: No      Within the past 12 months, did the food you bought just not last and you didn t have money to get more?: No   Transportation Needs: Low Risk  (9/4/2024)    Transportation Needs      Within the past 12 months, has lack of transportation kept you from medical appointments, getting your medicines, non-medical meetings or appointments, work, or from getting things that you need?: No   Physical Activity: Sufficiently Active (7/5/2024)    Exercise Vital Sign      Days of Exercise per Week: 6 days      Minutes of Exercise per Session: 80 min   Stress: No Stress Concern Present  "(7/5/2024)    [employer] [employer] - Occupational Stress Questionnaire      Feeling of Stress : Not at all   Social Connections: Socially Integrated (7/5/2024)    Social Connection and Isolation Panel [NHANES]      Frequency of Communication with Friends and Family: Twice a week      Frequency of Social Gatherings with Friends and Family: More than three times a week      Attends Latter-day Services: More than 4 times per year      Active Member of Clubs or Organizations: Yes      Attends Club or Organization Meetings: More than 4 times per year      Marital Status:    Interpersonal Safety: Low Risk  (9/6/2024)    Interpersonal Safety      Do you feel physically and emotionally safe where you currently live?: Yes      Within the past 12 months, have you been hit, slapped, kicked or otherwise physically hurt by someone?: No      Within the past 12 months, have you been humiliated or emotionally abused in other ways by your partner or ex-partner?: No   Housing Stability: Low Risk  (9/4/2024)    Housing Stability      Do you have housing? : Yes      Are you worried about losing your housing?: No        FAMILY HISTORY:   Family History   Problem Relation Age of Onset     Arthritis Mother         SLE     Connective Tissue Disorder Mother         lupus     Diabetes Mother      Cerebrovascular Disease Mother      Cancer Mother      Diabetes Father      Coronary Artery Disease Father      Chronic Obstructive Pulmonary Disease Father      Diabetes Sister      Diabetes Maternal Grandfather         EXAM:Vitals: /74   Ht 1.803 m (5' 11\")   Wt 107.5 kg (237 lb)   BMI 33.05 kg/m    BMI= Body mass index is 33.05 kg/m .      General appearance: Patient is alert and fully cooperative with history & exam.  No sign of distress is noted during the visit.      Respiratory: Breathing is regular & unlabored while sitting.      HEENT: Hearing is intact to spoken word.  Speech is clear.  No gross evidence " of visual impairment that would impact ambulation.      Dermatologic: Left foot submet one ulcer now closed and healed. No open lesions. No drainage. No callus.   Right foot also evaluated: no open lesions, preulcerative lesions or signs of infection.      Vascular: Dorsalis pedis and posterior tibial pulses are intact & regular bilaterally.  CFT and skin temperature is normal to both lower extremities.      Neurologic: Lower extremity sensation is diminished, bilateral foot, to light touch.  No evidence of neurological-based weakness or contracture in the lower extremities.       Musculoskeletal: Patient is ambulatory without an assistive device or brace.  S/p TMA on left foot.     Psychiatric: Affect is pleasant & appropriate.          Final Diagnosis   A.  Left forefoot, amputation:  -Surgical absence of the great and second toe  -Distal volar surface with deep ulcer with adjacent active, purulent osteomyelitis  -Tissues designated as margins without evidence of osteomyelitis     E.  Left foot, third proximal metatarsal, excision:  -No evidence of osteomyelitis       Foot, Left; Tissue   1 Result Note  Culture 1+ Staphylococcus caprae Abnormal    Identification obtained by MALDI-TOF mass spectrometry research use only database. Test characteristics determined and verified by the [facility].   1+ Staphylococcus simulans Abnormal    Susceptibilities not routinely done, refer to antibiogram to view typical susceptibility profiles      This specimen was received on a swab. Results may not be optimal. For maximum sensitivity of detection submit tissue, fluid or needle aspirate.        Resulting Agency: [facility]     Susceptibility     Staphylococcus caprae     JEFFREY     Ciprofloxacin <=0.5 ug/mL Susceptible     Clindamycin 0.25 ug/mL Susceptible     Daptomycin 0.5 ug/mL Susceptible     Doxycycline <=0.5 ug/mL Susceptible     Erythromycin <=0.25 ug/mL Susceptible     Gentamicin <=0.5 ug/mL  Susceptible     Levofloxacin 0.25 ug/mL Susceptible     Oxacillin <=0.25 ug/mL Susceptible 1     Tetracycline <=1 ug/mL Susceptible     Trimethoprim/Sulfamethoxazole 1/19 ug/mL Susceptible     Vancomycin 1 ug/mL Susceptible              IMAGING:     IMPRESSION: Normal first through fifth transmetatarsal amputation.  Soft tissue swelling. No acute fracture.    I personally reviewed the images and agree with the reports as stated.      ASSESSMENT:  Left foot TMA done on 6/18/24 --> now healed   Diabetes Mellitus --> hba1c 6.8     MEDICAL DECISION MAKING:   -Discussed all findings with patient. Chart and imaging reviewed.   -Left foot TMA site well healed at this point. Plantar ulcer is also closed and healed.   -B/L feet evaluated and no open or preulcerative sites.   -Discussed continued CMOs at all times. Is currently not at full activity due to gallbladder surgery. Discussed close monitoring of all sites as starts walking more.   -Plan to follow up in 4 weeks if needed. Can cancel this if there's no drainage, calluses or signs of infection/concern.     Tiny Soto DPM   [facility]/Foot & Ankle Surgery                Again, thank you for allowing me to participate in the care of your patient.        Sincerely,        Tiny Soto DPM

## 2024-09-11 NOTE — PROGRESS NOTES
[facility]  CLINIC NOTE    CHIEF COMPLAINT:  left foot    PATIENT HISTORY:  Crispin Rojas is a 64 year old male  who presents for follow up for his left foot. Had a TMA on 6/18/2024. Site healed uneventfully. He was also seen in the hospital when he was admitted for a hand infection. At that time, there was no open lesions to his left foot, but there was a small callus to the plantar first metatarsal. He was advised of this and to slowly increase activity. Since then he hiked three miles and states the submet one site started draining. States now the site has been dry. Is now back to work. Denies pain to the site. Denies N/F/V/C/D.   9/11: Follows up for his left foot. States no new issues to the foot and has been doing well. States the ulcer on the plantar site is totally healed. Was recently admitted for cholecystitis and had his gallbladder removed. Having ongoing abdominal pain which he states is improving. Currently, not wearing dressings on his feet. Is getting closer to full activity. Has CMO in tennis shoe.         Review of Systems:  A 10 point review of systems was performed and is positive for that noted above in the patient history.  All other areas are negative.     PAST MEDICAL HISTORY:   Past Medical History:   Diagnosis Date    Antiplatelet or antithrombotic long-term use     Ascending aorta dilatation (H24)     Cerebral artery occlusion with cerebral infarction (H)     Cerebral infarction (H)     2010    Gastroesophageal reflux disease with esophagitis     H/O blood clots 2022    Hyperlipidemia LDL goal <70     Hypertension     Nonalcoholic steatohepatitis 11/30/2022    Numbness and tingling     Obesity     GILA (obstructive sleep apnea) 10/22/2018    CPAP intolerant    PVD (peripheral vascular disease) (H24)     s/p left partial toe amputation    Renal stone     Tremor     Type 2 diabetes mellitus with diabetic polyneuropathy, with long-term current use of insulin (H)          PAST SURGICAL HISTORY:   Past Surgical History:   Procedure Laterality Date    AMPUTATE FOOT Left 8/18/2016    Procedure: AMPUTATE FOOT;  Surgeon: Jack Younger DPM;  Location: [facility]    AMPUTATE TOE(S) Right 2/1/2016    Procedure: AMPUTATE TOE(S);  Surgeon: Rachelle Manriquez DPM, Pod;  Location: [facility]    AMPUTATE TOE(S) Right 8/2/2019    Procedure: Right fourth toe partial amputation for treatment of osteomyelitis.;  Surgeon: Jack Younger DPM;  [facility]: [facility]    AMPUTATE TOE(S) Left 11/8/2019    Procedure: Left second toe amputation at the metatarsophalangeal joint.;  Surgeon: Rachelle Manriquez DPM, Podiatry/Foot and Ankle Surgery;  Location: [facility]    AMPUTATE TOE(S) Right 6/5/2022    Procedure: AMPUTATION OF RIGHT GREAT TOE;  Surgeon: Wili Quiles DPM;  Location: [facility]    AMPUTATE TOE(S) Right 7/28/2022    Procedure: Right foot partial first metatarsal resection;  Surgeon: Tiny Soto DPM;  Location: [facility]    AMPUTATE TOE(S) Left 6/18/2024    Procedure: Left foot transmetatarsal amputation;  Surgeon: Tiny Soto DPM;  Location: [facility]    ANGIOGRAM      BIOPSY BONE FOOT Right 7/24/2022    Procedure: Bone biopsy, right first metatarsal.;  Surgeon: Valentino Molina DPM;  Location: [facility]    COLONOSCOPY      COMBINED CYSTOSCOPY, RETROGRADES, URETEROSCOPY, INSERT STENT Left 8/21/2016    Procedure: COMBINED CYSTOSCOPY, RETROGRADES, URETEROSCOPY, INSERT STENT;  Surgeon: Artemio Valenzuela MD;  Location: [facility]    COMBINED CYSTOSCOPY, RETROGRADES, URETEROSCOPY, LASER HOLMIUM LITHOTRIPSY URETER(S), INSERT STENT Left 10/3/2016    Procedure: COMBINED CYSTOSCOPY, RETROGRADES, URETEROSCOPY, LASER HOLMIUM LITHOTRIPSY URETER(S), INSERT STENT;  Surgeon: Artemio Valenzuela MD;  [facility]    EXTRACORPOREAL SHOCK WAVE LITHOTRIPSY (ESWL) Left 9/8/2016    Procedure: EXTRACORPOREAL SHOCK WAVE LITHOTRIPSY (ESWL);  Surgeon: Artemio Valenzuela MD;  Location: [facility]    INCISION  AND DRAINAGE FOOT, COMBINED Right 7/24/2022    Procedure: Incision and drainage, right foot ;  Surgeon: Valentino Molina DPM;  Location: [facility]    IRRIGATION AND DEBRIDEMENT FOOT, COMBINED Left 10/19/2023    Procedure: Left foot second metatarsal resection , . Left plantar ulcer excisional debridement, down to and including tendon, with closure, site measuring 4 cm x 2 cm x 1 cm;  Surgeon: Tiny Soto DPM;  [facility]: [facility]    IRRIGATION AND DEBRIDEMENT HAND, COMBINED Right 7/30/2024    Procedure: Irrigation and Debridement of Right Dorsal Hand and wrist;  Surgeon: June Mcdaniels MD;  [facility]: [facility]    LAPAROSCOPIC CHOLECYSTECTOMY N/A 9/6/2024    Procedure: Laparoscopic cholecystectomy;  Surgeon: Aakash Meraz MD;  [facility]: [facility]    OSTEOTOMY FOOT Right 11/30/2022    Procedure: 1. Right second metatarsal floating osteotomy  2. Right second digit proximal phalanx arthroplasty 3. Right percutaneous flexor tenotomy;  Surgeon: Tiny Soto DPM;  Location: [facility]    OSTEOTOMY FOOT Right 1/19/2023    Procedure: Right foot third metatarsal head excision, ulcer debridement, matatarsal osteotomies;  Surgeon: Tiny Soto DPM;  Location: [facility]    RECESSION GASTROCNEMIUS Right 2/1/2016    Procedure: RECESSION GASTROCNEMIUS;  Surgeon: Rachelle Manriquez DPM, Pod;  [facility]: [facility]        MEDICATIONS:  Reviewed in Epic.     ALLERGIES:    Allergies   Allergen Reactions    Bactrim [Sulfamethoxazole-Trimethoprim] Nausea and Vomiting    Statins Swelling     Other reaction(s): Other (see comments)  SIMCOR caused edema in extremities  Only Simvastatin    Sulfatolamide Nausea and Vomiting    Niacin Swelling     SIMCOR caused edema in extremities    Simvastatin Swelling     SIMCOR caused edema in extremities    Sulfa Antibiotics Nausea        SOCIAL HISTORY:   Social History     Socioeconomic History    Marital status:      Spouse name: Sydney    Number of children: 2    Years of  education: Not on file    Highest education level: Master's degree (e.g., MA, MS, [place], MEd, MSW, ELIANA)   Occupational History    Occupation: marketing     Employer: [employer]     Comment: [employer]   Tobacco Use    Smoking status: Never    Smokeless tobacco: Never   Vaping Use    Vaping status: Never Used   Substance and Sexual Activity    Alcohol use: Yes     Comment: rare---red wine 3x per month    Drug use: Never    Sexual activity: Not Currently     Partners: Female   Other Topics Concern    Parent/sibling w/ CABG, MI or angioplasty before 65F 55M? No   Social History Narrative    Not on file     Social Determinants of Health     Financial Resource Strain: Low Risk  (9/4/2024)    Financial Resource Strain     Within the past 12 months, have you or your family members you live with been unable to get utilities (heat, electricity) when it was really needed?: No   Food Insecurity: Low Risk  (9/4/2024)    Food Insecurity     Within the past 12 months, did you worry that your food would run out before you got money to buy more?: No     Within the past 12 months, did the food you bought just not last and you didn t have money to get more?: No   Transportation Needs: Low Risk  (9/4/2024)    Transportation Needs     Within the past 12 months, has lack of transportation kept you from medical appointments, getting your medicines, non-medical meetings or appointments, work, or from getting things that you need?: No   Physical Activity: Sufficiently Active (7/5/2024)    Exercise Vital Sign     Days of Exercise per Week: 6 days     Minutes of Exercise per Session: 80 min   Stress: No Stress Concern Present (7/5/2024)    [employer] [place] [employer] - Occupational Stress Questionnaire     Feeling of Stress : Not at all   Social Connections: Socially Integrated (7/5/2024)    Social Connection and Isolation Panel [NHANES]     Frequency of Communication with Friends and Family: Twice a week     Frequency of Social  "Gatherings with Friends and Family: More than three times a week     Attends [employer] Services: More than 4 times per year     Active Member of Clubs or Organizations: Yes     Attends Club or Organization Meetings: More than 4 times per year     Marital Status:    Interpersonal Safety: Low Risk  (9/6/2024)    Interpersonal Safety     Do you feel physically and emotionally safe where you currently live?: Yes     Within the past 12 months, have you been hit, slapped, kicked or otherwise physically hurt by someone?: No     Within the past 12 months, have you been humiliated or emotionally abused in other ways by your partner or ex-partner?: No   Housing Stability: Low Risk  (9/4/2024)    Housing Stability     Do you have housing? : Yes     Are you worried about losing your housing?: No        FAMILY HISTORY:   Family History   Problem Relation Age of Onset    Arthritis Mother         SLE    Connective Tissue Disorder Mother         lupus    Diabetes Mother     Cerebrovascular Disease Mother     Cancer Mother     Diabetes Father     Coronary Artery Disease Father     Chronic Obstructive Pulmonary Disease Father     Diabetes Sister     Diabetes Maternal Grandfather         EXAM:Vitals: /74   Ht 1.803 m (5' 11\")   Wt 107.5 kg (237 lb)   BMI 33.05 kg/m    BMI= Body mass index is 33.05 kg/m .      General appearance: Patient is alert and fully cooperative with history & exam.  No sign of distress is noted during the visit.      Respiratory: Breathing is regular & unlabored while sitting.      HEENT: Hearing is intact to spoken word.  Speech is clear.  No gross evidence of visual impairment that would impact ambulation.      Dermatologic: Left foot submet one ulcer now closed and healed. No open lesions. No drainage. No callus.   Right foot also evaluated: no open lesions, preulcerative lesions or signs of infection.      Vascular: Dorsalis pedis and posterior tibial pulses are intact & regular bilaterally.  " CFT and skin temperature is normal to both lower extremities.      Neurologic: Lower extremity sensation is diminished, bilateral foot, to light touch.  No evidence of neurological-based weakness or contracture in the lower extremities.       Musculoskeletal: Patient is ambulatory without an assistive device or brace.  S/p TMA on left foot.     Psychiatric: Affect is pleasant & appropriate.          Final Diagnosis   A.  Left forefoot, amputation:  -Surgical absence of the great and second toe  -Distal volar surface with deep ulcer with adjacent active, purulent osteomyelitis  -Tissues designated as margins without evidence of osteomyelitis     E.  Left foot, third proximal metatarsal, excision:  -No evidence of osteomyelitis       Foot, Left; Tissue   1 Result Note  Culture 1+ Staphylococcus caprae Abnormal    Identification obtained by MALDI-TOF mass spectrometry research use only database. Test characteristics determined and verified by the [facility].   1+ Staphylococcus simulans Abnormal    Susceptibilities not routinely done, refer to antibiogram to view typical susceptibility profiles      This specimen was received on a swab. Results may not be optimal. For maximum sensitivity of detection submit tissue, fluid or needle aspirate.        Resulting Agency: [facility]     Susceptibility     Staphylococcus caprae     JEFFREY     Ciprofloxacin <=0.5 ug/mL Susceptible     Clindamycin 0.25 ug/mL Susceptible     Daptomycin 0.5 ug/mL Susceptible     Doxycycline <=0.5 ug/mL Susceptible     Erythromycin <=0.25 ug/mL Susceptible     Gentamicin <=0.5 ug/mL Susceptible     Levofloxacin 0.25 ug/mL Susceptible     Oxacillin <=0.25 ug/mL Susceptible 1     Tetracycline <=1 ug/mL Susceptible     Trimethoprim/Sulfamethoxazole 1/19 ug/mL Susceptible     Vancomycin 1 ug/mL Susceptible              IMAGING:     IMPRESSION: Normal first through fifth transmetatarsal amputation.  Soft tissue swelling. No acute  fracture.    I personally reviewed the images and agree with the reports as stated.      ASSESSMENT:  Left foot TMA done on 6/18/24 --> now healed   Diabetes Mellitus --> hba1c 6.8     MEDICAL DECISION MAKING:   -Discussed all findings with patient. Chart and imaging reviewed.   -Left foot TMA site well healed at this point. Plantar ulcer is also closed and healed.   -B/L feet evaluated and no open or preulcerative sites.   -Discussed continued CMOs at all times. Is currently not at full activity due to gallbladder surgery. Discussed close monitoring of all sites as starts walking more.   -Plan to follow up in 4 weeks if needed. Can cancel this if there's no drainage, calluses or signs of infection/concern.     SHRUTHI Rios Department of Podiatry/Foot & Ankle Surgery

## 2024-09-11 NOTE — PROGRESS NOTES
[facility]    Background: Transitional Care Management program identified per system criteria and reviewed by [facility] team for possible outreach.    Assessment:  Upon chart review, patient is in communication with a clinic team member, nurse or provider within [facility] for reason of discussing hospital follow up plan of care and answering questions patient may have related to discharge instructions. [facility] Team member will update episode status of Transitional Care Management program to enrolled per system workflows.     [facility] made first outreach attempt on 9/10 to reach patient for hospital follow up and left message and that time.    Patient has active communication with a nurse, provider or care team for reason of post-hospital follow up plan.  Outreach call by [facility] team not indicated to minimize duplicative efforts.     Patient was contacted yesterday by an MD in General Surgery from [facility] per patient request. Active communication is appropriate for ED/Hospital discharge and follow-up. No [facility] outreach call needed at this time. Chart review second outreach.     Plan: [facility] will make no additional outreach attempts to minimize duplicative efforts and reduce potential confusion for patient.      SAMMI Taylor  [phone]  [facility]     *Connected Care Resource Team does NOT follow patient ongoing. Referrals are identified based on internal discharge reports and the outreach is to ensure patient has an understanding of their discharge instructions.

## 2024-09-26 ENCOUNTER — OFFICE VISIT (OUTPATIENT)
Dept: FAMILY MEDICINE | Facility: CLINIC | Age: 64
End: 2024-09-26
Payer: COMMERCIAL

## 2024-09-26 VITALS
RESPIRATION RATE: 14 BRPM | HEART RATE: 62 BPM | DIASTOLIC BLOOD PRESSURE: 70 MMHG | HEIGHT: 71 IN | OXYGEN SATURATION: 97 % | WEIGHT: 237 LBS | BODY MASS INDEX: 33.18 KG/M2 | SYSTOLIC BLOOD PRESSURE: 119 MMHG | TEMPERATURE: 97.7 F

## 2024-09-26 DIAGNOSIS — Z90.49 S/P LAPAROSCOPIC CHOLECYSTECTOMY: Primary | ICD-10-CM

## 2024-09-26 PROCEDURE — 90471 IMMUNIZATION ADMIN: CPT | Performed by: FAMILY MEDICINE

## 2024-09-26 PROCEDURE — 99213 OFFICE O/P EST LOW 20 MIN: CPT | Mod: 25 | Performed by: FAMILY MEDICINE

## 2024-09-26 PROCEDURE — 91320 SARSCV2 VAC 30MCG TRS-SUC IM: CPT | Performed by: FAMILY MEDICINE

## 2024-09-26 PROCEDURE — 90480 ADMN SARSCOV2 VAC 1/ONLY CMP: CPT | Performed by: FAMILY MEDICINE

## 2024-09-26 PROCEDURE — 90673 RIV3 VACCINE NO PRESERV IM: CPT | Performed by: FAMILY MEDICINE

## 2024-09-26 NOTE — PROGRESS NOTES
"  Assessment & Plan     S/P laparoscopic cholecystectomy  Doing well status post a cholecystectomy.  Routine healing.  No complications.  Resume diet as tolerated.          MED REC REQUIRED  Post Medication Reconciliation Status:  Discharge medications reconciled, continue medications without change  BMI  Estimated body mass index is 33.05 kg/m  as calculated from the following:    Height as of this encounter: 1.803 m (5' 11\").    Weight as of this encounter: 107.5 kg (237 lb).             Subjective   Pat is a 64 year old, presenting for the following health issues:  Hospital F/U    [facility]       Hospital Follow-up Visit:    Hospital/Nursing Home/IP Rehab Facility: [facility]  Date of Admission: 9-4-24    Date of Discharge: 9-9-24  Reason(s) for Admission: Acute cholecystitis   Was the patient in the ICU or did the patient experience delirium during hospitalization?  No  Do you have any other stressors you would like to discuss with your provider? No    Problems taking medications regularly:  None  Medication changes since discharge: stopped taking the muscle relaxer   Problems adhering to non-medication therapy:  None    Summary of hospitalization:  [facility] discharge summary reviewed  Diagnostic Tests/Treatments reviewed.  Follow up needed: none  Other Healthcare Providers Involved in Patient s Care:         None  Update since discharge: improved.    Plan of care communicated with patient     Doing well following his cholecystectomy, he is enjoying waking up without nausea in the morning.  He has noticed some more foul-smelling stools however no pain with bowel movements.  Tolerating meals without difficulty.      Objective    /70 (BP Location: Right arm, Patient Position: Chair, Cuff Size: Adult Regular)   Pulse 62   Temp 97.7  F (36.5  C) (Oral)   Resp 14   Ht 1.803 m (5' 11\")   Wt 107.5 kg (237 lb)   SpO2 97%   BMI 33.05 kg/m    Body mass index is 33.05 " kg/m .  Physical Exam  Vitals reviewed.   Constitutional:       Appearance: He is not ill-appearing.   Cardiovascular:      Rate and Rhythm: Normal rate and regular rhythm.   Pulmonary:      Effort: Pulmonary effort is normal.      Breath sounds: Normal breath sounds.   Abdominal:      General: There is no distension.      Tenderness: There is no abdominal tenderness.      Comments: Laparoscopic incisions on the abdomen are clean dry and intact.                    Signed Electronically by: Coy Clemens MD

## 2024-09-27 DIAGNOSIS — E11.42 TYPE 2 DIABETES MELLITUS WITH PERIPHERAL NEUROPATHY (H): Primary | ICD-10-CM

## 2024-09-27 RX ORDER — INSULIN DEGLUDEC 100 U/ML
INJECTION, SOLUTION SUBCUTANEOUS
Qty: 45 ML | Refills: 1 | Status: SHIPPED | OUTPATIENT
Start: 2024-09-27

## 2024-09-27 NOTE — TELEPHONE ENCOUNTER
Requested Prescriptions   Pending Prescriptions Disp Refills    TRESIBA FLEXTOUCH 100 UNIT/ML pen [Pharmacy Med Name: TRESIBA FLEXTOUCH PEN ([id number]] 45 mL      Sig: ADMINISTER 50 TO 55 UNITS UNDER THE SKIN DAILY       Insulin Protocol Failed - 9/27/2024 12:28 PM        Failed - Medication indicated for associated diagnosis     Medication is associated with one or more of the following diagnoses:   - Type 1 diabetes mellitus  - Type 2 diabetes mellitus  - Diabetic nephropathy; Prophylaxis  - Neuropathy due to diabetes mellitus; Prophylaxis  - Retinopathy due to diabetes mellitus; Prophylaxis  - Diabetes mellitus associated with cystic fibrosis  - Disorder of cardiovascular system; Prophylaxis - Type 1 diabetes mellitus   - Disorder of cardiovascular system; Prophylaxis - Type 2 diabetes mellitus            Failed - Chart Review Required     Review Chart.    Do not approve if insulin is used in a pump.  Instead, direct refill request to the patient's endocrinologist.  If the patient doesn't have an endocrinologist, then send the refill to the patient's PCP for review            Passed - Medication is active on med list        Passed - Has GFR on file in past 12 months and most recent value is normal        Passed - Recent (6 mo) or future (90 days) visit within the authorizing provider's specialty     The patient must have completed an in-person or virtual visit within the past 6 months or has a future visit scheduled within the next 90 days with the authorizing provider s specialty.  Urgent care and e-visits do not quality as an office visit for this protocol.          Passed - Patient is 18 years of age or older

## 2024-09-30 ENCOUNTER — TELEPHONE (OUTPATIENT)
Dept: SURGERY | Facility: CLINIC | Age: 64
End: 2024-09-30
Payer: COMMERCIAL

## 2024-09-30 DIAGNOSIS — Z98.890 POSTOPERATIVE STATE: Primary | ICD-10-CM

## 2024-09-30 NOTE — TELEPHONE ENCOUNTER
[facility]   Postoperative Follow-up Phone Call  -Call to patient to review recent procedure and recovery    Attempted to call patient for post op check.  No answer.  Message was left for patient to call back if they had any questions or concerns.  [employer] message sent.    Ashtyn Oneill PA-C       [employer] message:   Claude Dimas,     I left a message on your ph# to call if you have questions/concerns after your gall bladder surgery.    I also wanted to pass on to you that your pathology report confirmed: severe inflammation of your gall bladder and that there were gallstones present.      You may now remove skin glue or tapes from incision sites.    You may slowly increase activity as tolerated.  Resume workout/strenuous activity at 1 month after surgery.    Please contact me if other questions.    Thank you!  Ashtyn Oneill PA-C

## 2024-10-12 ENCOUNTER — HEALTH MAINTENANCE LETTER (OUTPATIENT)
Age: 64
End: 2024-10-12

## 2024-10-13 DIAGNOSIS — E11.42 TYPE 2 DIABETES MELLITUS WITH PERIPHERAL NEUROPATHY (H): ICD-10-CM

## 2024-10-14 RX ORDER — METFORMIN HYDROCHLORIDE 500 MG/1
1000 TABLET, EXTENDED RELEASE ORAL 2 TIMES DAILY
Qty: 360 TABLET | Refills: 0 | Status: SHIPPED | OUTPATIENT
Start: 2024-10-14

## 2024-10-14 NOTE — TELEPHONE ENCOUNTER
Requested Prescriptions   Pending Prescriptions Disp Refills    metFORMIN (GLUCOPHAGE XR) 500 MG 24 hr tablet [Pharmacy Med Name: METFORMIN ER 500MG 24HR TABS] 360 tablet 0     Sig: TAKE 2 TABLETS(1000 MG) BY MOUTH TWICE DAILY       Biguanide Agents Passed - 10/13/2024  5:42 PM        Passed - Patient is age 10 or older        Passed - Patient has documented A1c within the specified period of time.     If HgbA1C is 8 or greater, it needs to be on file within the past 3 months.  If less than 8, must be on file within the past 6 months.     Recent Labs   Lab Test 07/05/24  1157   A1C 6.8*             Passed - Patient does NOT have a diagnosis of CHF.        Passed - Medication is active on med list        Passed - Medication indicated for associated diagnosis     Medication is associated with one or more of the following diagnoses:     Gestational diabetes mellitus     Hyperinsulinar obesity     Hypersecretion of ovarian androgens    Non-alcoholic fatty liver    Polycystic ovarian syndrome               Pre-diabetes (DM 2 prevention)    Type 2 diabetes mellitus     Weight gain, antipsychotic therapy-induced    Impaired fasting glucose          Passed - Has GFR on file in past 12 months and most recent value is normal        Passed - Recent (6 mo) or future (90 days) visit within the authorizing provider's specialty     The patient must have completed an in-person or virtual visit within the past 6 months or has a future visit scheduled within the next 90 days with the authorizing provider s specialty.  Urgent care and e-visits do not quality as an office visit for this protocol.

## 2024-10-29 ENCOUNTER — OFFICE VISIT (OUTPATIENT)
Dept: PODIATRY | Facility: CLINIC | Age: 64
End: 2024-10-29
Payer: COMMERCIAL

## 2024-10-29 VITALS
WEIGHT: 234 LBS | HEIGHT: 72 IN | DIASTOLIC BLOOD PRESSURE: 82 MMHG | BODY MASS INDEX: 31.69 KG/M2 | SYSTOLIC BLOOD PRESSURE: 130 MMHG

## 2024-10-29 DIAGNOSIS — L84 CALLUS OF FOOT: ICD-10-CM

## 2024-10-29 DIAGNOSIS — Z98.890 S/P FOOT SURGERY, RIGHT: ICD-10-CM

## 2024-10-29 DIAGNOSIS — Z89.432 S/P TRANSMETATARSAL AMPUTATION OF FOOT, LEFT (H): Primary | ICD-10-CM

## 2024-10-29 PROCEDURE — 99213 OFFICE O/P EST LOW 20 MIN: CPT | Performed by: PODIATRIST

## 2024-10-29 NOTE — LETTER
10/29/2024      Crispin Rojas  [address] [facility] [address]      Dear Colleague,    Thank you for referring your patient, Crispin Rojas, to the [facility]. Please see a copy of my visit note below.     [facility]  CLINIC NOTE    CHIEF COMPLAINT:  left foot    PATIENT HISTORY:  Crispin Rojas is a 64 year old male  who presents for follow up for his left foot. Had a TMA on 6/18/2024. Site healed uneventfully. He was also seen in the hospital when he was admitted for a hand infection. At that time, there was no open lesions to his left foot, but there was a small callus to the plantar first metatarsal. He was advised of this and to slowly increase activity. Since then he hiked three miles and states the submet one site started draining. States now the site has been dry. Is now back to work. Denies pain to the site. Denies N/F/V/C/D.   9/11: Follows up for his left foot. States no new issues to the foot and has been doing well. States the ulcer on the plantar site is totally healed. Was recently admitted for cholecystitis and had his gallbladder removed. Having ongoing abdominal pain which he states is improving. Currently, not wearing dressings on his feet. Is getting closer to full activity. Has CMO in tennis shoe.   10/29: Follows up for bilateral feet. No pain or issues. Is up to full activity now, golfing and working. No draining sites. Does note that his left foot cracks from time to time. Otherwise denies N/F/V/C/D.         Review of Systems:  A 10 point review of systems was performed and is positive for that noted above in the patient history.  All other areas are negative.     PAST MEDICAL HISTORY:   Past Medical History:   Diagnosis Date     Antiplatelet or antithrombotic long-term use      Ascending aorta dilatation (H)      Cerebral artery occlusion with cerebral infarction (H)      Cerebral infarction (H)     2010      Gastroesophageal reflux disease with esophagitis      H/O blood clots 2022     Hyperlipidemia LDL goal <70      Hypertension      Nonalcoholic steatohepatitis 11/30/2022     Numbness and tingling      Obesity      GILA (obstructive sleep apnea) 10/22/2018    CPAP intolerant     PVD (peripheral vascular disease) (H)     s/p left partial toe amputation     Renal stone      Tremor      Type 2 diabetes mellitus with diabetic polyneuropathy, with long-term current use of insulin (H)         PAST SURGICAL HISTORY:   Past Surgical History:   Procedure Laterality Date     AMPUTATE FOOT Left 8/18/2016    Procedure: AMPUTATE FOOT;  Surgeon: Jack Younger DPM;  Location: [facility]     AMPUTATE TOE(S) Right 2/1/2016    Procedure: AMPUTATE TOE(S);  Surgeon: Rachelle Manriquez DPM, Pod;  Location: [facility]     AMPUTATE TOE(S) Right 8/2/2019    Procedure: Right fourth toe partial amputation for treatment of osteomyelitis.;  Surgeon: Jack Younger DPM;  Location: [facility]     AMPUTATE TOE(S) Left 11/8/2019    Procedure: Left second toe amputation at the metatarsophalangeal joint.;  Surgeon: Rachelle Manriquez DPM, Podiatry/Foot and Ankle Surgery;  Location: [facility]     AMPUTATE TOE(S) Right 6/5/2022    Procedure: AMPUTATION OF RIGHT GREAT TOE;  Surgeon: Wili Quiles DPM;  Location: [facility]     AMPUTATE TOE(S) Right 7/28/2022    Procedure: Right foot partial first metatarsal resection;  Surgeon: Tiny Soto DPM;  Location: [facility]     AMPUTATE TOE(S) Left 6/18/2024    Procedure: Left foot transmetatarsal amputation;  Surgeon: Tiny Soto DPM;  Location: [facility]     ANGIOGRAM       BIOPSY BONE FOOT Right 7/24/2022    Procedure: Bone biopsy, right first metatarsal.;  Surgeon: Valentino Molina DPM;  Location: [facility]     COLONOSCOPY       COMBINED CYSTOSCOPY, RETROGRADES, URETEROSCOPY, INSERT STENT Left 8/21/2016    Procedure: COMBINED CYSTOSCOPY, RETROGRADES, URETEROSCOPY, INSERT STENT;  Surgeon: Artemio Valenzuela,  MD;  Location: [facility]     COMBINED CYSTOSCOPY, RETROGRADES, URETEROSCOPY, LASER HOLMIUM LITHOTRIPSY URETER(S), INSERT STENT Left 10/3/2016    Procedure: COMBINED CYSTOSCOPY, RETROGRADES, URETEROSCOPY, LASER HOLMIUM LITHOTRIPSY URETER(S), INSERT STENT;  Surgeon: Artemio Valenzuela MD;  [facility]: [facility]     EXTRACORPOREAL SHOCK WAVE LITHOTRIPSY (ESWL) Left 9/8/2016    Procedure: EXTRACORPOREAL SHOCK WAVE LITHOTRIPSY (ESWL);  Surgeon: Artemio Valenzuela MD;  Location: [facility]     INCISION AND DRAINAGE FOOT, COMBINED Right 7/24/2022    Procedure: Incision and drainage, right foot ;  Surgeon: Valentino Molina DPM;  Location: [facility]     IRRIGATION AND DEBRIDEMENT FOOT, COMBINED Left 10/19/2023    Procedure: Left foot second metatarsal resection , . Left plantar ulcer excisional debridement, down to and including tendon, with closure, site measuring 4 cm x 2 cm x 1 cm;  Surgeon: Tiny Soto DPM;  Location: [facility]     IRRIGATION AND DEBRIDEMENT HAND, COMBINED Right 7/30/2024    Procedure: Irrigation and Debridement of Right Dorsal Hand and wrist;  Surgeon: June Mcdaniels MD;  [facility]: [facility]     LAPAROSCOPIC CHOLECYSTECTOMY N/A 9/6/2024    Procedure: Laparoscopic cholecystectomy;  Surgeon: Aakash Meraz MD;  Location: [facility]     OSTEOTOMY FOOT Right 11/30/2022    Procedure: 1. Right second metatarsal floating osteotomy  2. Right second digit proximal phalanx arthroplasty 3. Right percutaneous flexor tenotomy;  Surgeon: Tiny Soto DPM;  Location: [facility]     OSTEOTOMY FOOT Right 1/19/2023    Procedure: Right foot third metatarsal head excision, ulcer debridement, matatarsal osteotomies;  Surgeon: Tiny Soto DPM;  Location: [facility]     RECESSION GASTROCNEMIUS Right 2/1/2016    Procedure: RECESSION GASTROCNEMIUS;  Surgeon: Rachelle Manriquez DPM, Pod;  Location: [facility]        MEDICATIONS:  Reviewed in Epic.     ALLERGIES:    Allergies   Allergen Reactions     Bactrim  [Sulfamethoxazole-Trimethoprim] Nausea and Vomiting     Statins Swelling     Other reaction(s): Other (see comments)  SIMCOR caused edema in extremities  Only Simvastatin     Sulfatolamide Nausea and Vomiting     Niacin Swelling     SIMCOR caused edema in extremities     Simvastatin Swelling     SIMCOR caused edema in extremities     Sulfa Antibiotics Nausea        SOCIAL HISTORY:   Social History     Socioeconomic History     Marital status:      Spouse name: Sydney     Number of children: 2     Years of education: Not on file     Highest education level: Master's degree (e.g., MA, MS, [place], MEd, MSW, ELIANA)   Occupational History     Occupation: marketing     Employer: [employer]     Comment: [email or web address]   Tobacco Use     Smoking status: Never     Smokeless tobacco: Never   Vaping Use     Vaping status: Never Used   Substance and Sexual Activity     Alcohol use: Yes     Comment: rare---red wine 3x per month     Drug use: Never     Sexual activity: Not Currently     Partners: Female   Other Topics Concern     Parent/sibling w/ CABG, MI or angioplasty before 65F 55M? No   Social History Narrative     Not on file     Social Drivers of Health     Financial Resource Strain: Low Risk  (9/4/2024)    Financial Resource Strain      Within the past 12 months, have you or your family members you live with been unable to get utilities (heat, electricity) when it was really needed?: No   Food Insecurity: Low Risk  (9/4/2024)    Food Insecurity      Within the past 12 months, did you worry that your food would run out before you got money to buy more?: No      Within the past 12 months, did the food you bought just not last and you didn t have money to get more?: No   Transportation Needs: Low Risk  (9/4/2024)    Transportation Needs      Within the past 12 months, has lack of transportation kept you from medical appointments, getting your medicines, non-medical meetings or appointments, work, or from getting things that  "you need?: No   Physical Activity: Sufficiently Active (7/5/2024)    Exercise Vital Sign      Days of Exercise per Week: 6 days      Minutes of Exercise per Session: 80 min   Stress: No Stress Concern Present (7/5/2024)    [employer] - Occupational Stress Questionnaire      Feeling of Stress : Not at all   Social Connections: Unknown (7/5/2024)    Social Connection and Isolation Panel [NHANES]      Frequency of Communication with Friends and Family: Not on file      Frequency of Social Gatherings with Friends and Family: More than three times a week      Attends [employer]ism Services: Not on file      Active Member of Clubs or Organizations: Not on file      Attends Club or Organization Meetings: Not on file      Marital Status: Not on file   Interpersonal Safety: Low Risk  (9/6/2024)    Interpersonal Safety      Do you feel physically and emotionally safe where you currently live?: Yes      Within the past 12 months, have you been hit, slapped, kicked or otherwise physically hurt by someone?: No      Within the past 12 months, have you been humiliated or emotionally abused in other ways by your partner or ex-partner?: No   Housing Stability: Low Risk  (9/4/2024)    Housing Stability      Do you have housing? : Yes      Are you worried about losing your housing?: No        FAMILY HISTORY:   Family History   Problem Relation Age of Onset     Arthritis Mother         SLE     Connective Tissue Disorder Mother         lupus     Diabetes Mother      Cerebrovascular Disease Mother      Cancer Mother      Diabetes Father      Coronary Artery Disease Father      Chronic Obstructive Pulmonary Disease Father      Diabetes Sister      Diabetes Maternal Grandfather         EXAM:Vitals: Ht 1.816 m (5' 11.5\")   Wt 106.1 kg (234 lb)   BMI 32.18 kg/m    BMI= Body mass index is 32.18 kg/m .      General appearance: Patient is alert and fully cooperative with history & exam.  No sign of distress is noted " during the visit.      Respiratory: Breathing is regular & unlabored while sitting.      HEENT: Hearing is intact to spoken word.  Speech is clear.  No gross evidence of visual impairment that would impact ambulation.      Dermatologic: Left foot TMA site: well closed and healed. No open lesions.   Right foot also evaluated: no open lesions, preulcerative lesions or signs of infection.      Vascular: Dorsalis pedis and posterior tibial pulses are intact & regular bilaterally.  CFT and skin temperature is normal to both lower extremities.      Neurologic: Lower extremity sensation is diminished, bilateral foot, to light touch.  No evidence of neurological-based weakness or contracture in the lower extremities.       Musculoskeletal: Patient is ambulatory without an assistive device or brace.  S/p TMA on left foot.     Psychiatric: Affect is pleasant & appropriate.          Final Diagnosis   A.  Left forefoot, amputation:  -Surgical absence of the great and second toe  -Distal volar surface with deep ulcer with adjacent active, purulent osteomyelitis  -Tissues designated as margins without evidence of osteomyelitis     E.  Left foot, third proximal metatarsal, excision:  -No evidence of osteomyelitis       Foot, Left; Tissue   1 Result Note  Culture 1+ Staphylococcus caprae Abnormal    Identification obtained by MALDI-TOF mass spectrometry research use only database. Test characteristics determined and verified by the [facility].   1+ Staphylococcus simulans Abnormal    Susceptibilities not routinely done, refer to antibiogram to view typical susceptibility profiles      This specimen was received on a swab. Results may not be optimal. For maximum sensitivity of detection submit tissue, fluid or needle aspirate.        Resulting Agency: [facility]     Susceptibility     Staphylococcus caprae     JEFFREY     Ciprofloxacin <=0.5 ug/mL Susceptible     Clindamycin 0.25 ug/mL Susceptible     Daptomycin  "0.5 ug/mL Susceptible     Doxycycline <=0.5 ug/mL Susceptible     Erythromycin <=0.25 ug/mL Susceptible     Gentamicin <=0.5 ug/mL Susceptible     Levofloxacin 0.25 ug/mL Susceptible     Oxacillin <=0.25 ug/mL Susceptible 1     Tetracycline <=1 ug/mL Susceptible     Trimethoprim/Sulfamethoxazole 1/19 ug/mL Susceptible     Vancomycin 1 ug/mL Susceptible              IMAGING:     IMPRESSION: Normal first through fifth transmetatarsal amputation.  Soft tissue swelling. No acute fracture.    I personally reviewed the images and agree with the reports as stated.      ASSESSMENT:  Left foot TMA done on 6/18/24 --> now healed   Right foot submet 2 callus --> not open  Diabetes Mellitus --> hba1c 6.8     MEDICAL DECISION MAKING:   -Discussed all findings with patient. Chart and imaging reviewed.   -No open lesions to bilateral feet. Patient to full activity and is doing well. Does note some \"cracking\" from time to time in the arch of his left foot. States nonpainful or swelling.   -Right foot callus evaluated, no open lesions to site.   -Patient asks about CMOs. He's been using them and things are going well. Asks about more inserts. His insurance states a form can be filled out for this. A letter of medical necessity was written for patient. No other other form exists that I know of.   -Otherwise, no new issues. Follow up in 6 weeks for continued maintenance if needed. Otherwise should evaluate daily for blisters, calluses or open lesions and follow up sooner if needed.         Tiny Soto DPM   [facility] & Ankle [facility]                Again, thank you for allowing me to participate in the care of your patient.        Sincerely,        Tiny Soto DPM  "

## 2024-10-29 NOTE — LETTER
October 29, 2024      Crispin Rojas  [address] [facility] [address]        Letter of Medical Necessity:   -Patient is well known to myself for bilateral foot ulcers, infections and amputations. He's now doing well with custom molded orthotics and is ulcer free. He requires more custom molded orthotics for work and his personal life. Please provide three more pairs of inserts.           Sincerely,        Tiny Soto DPM

## 2024-10-29 NOTE — PATIENT INSTRUCTIONS
Thank you for choosing [facility] / Foot & Ankle Surgery!    DR[facility] SAGE'S CLINIC:  [facility] SCHEDULE SURGERY: [phone]   [address] BILLING QUESTIONS: [phone]   [place] [address] APPOINTMENTS: [phone]   PH: [phone] CONSUMER [facility]ILLIPS LINE:[phone]   FAX: [phone] RADIOLOGY: [phone]    [facility]: [phone]    PHYSICAL THERAPY: [phone]     Follow up:   -6 weeks if needed, for right foot       Next steps:   -Continue to closely monitor feet, including right foot callus. Use pumice stone to keep calluses down as low as able. Apply lotion to feet each day.   -If any sites begin to drain, form large calluses or blisters, come into clinic sooner than above.       Flu vaccines are now available at all [facility] clinics and retail pharmacies across the [facility]. Appointments are required for clinic locations. To schedule an appointment online, please log into [employer] or create an account if you are a new user. You can also call [phone], or simply walk in at one of the [facility] retail pharmacy locations.

## 2024-10-29 NOTE — PROGRESS NOTES
[place] [facility]  CLINIC NOTE    CHIEF COMPLAINT:  left foot    PATIENT HISTORY:  Crispin Rojas is a 64 year old male  who presents for follow up for his left foot. Had a TMA on 6/18/2024. Site healed uneventfully. He was also seen in the hospital when he was admitted for a hand infection. At that time, there was no open lesions to his left foot, but there was a small callus to the plantar first metatarsal. He was advised of this and to slowly increase activity. Since then he hiked three miles and states the submet one site started draining. States now the site has been dry. Is now back to work. Denies pain to the site. Denies N/F/V/C/D.   9/11: Follows up for his left foot. States no new issues to the foot and has been doing well. States the ulcer on the plantar site is totally healed. Was recently admitted for cholecystitis and had his gallbladder removed. Having ongoing abdominal pain which he states is improving. Currently, not wearing dressings on his feet. Is getting closer to full activity. Has CMO in tennis shoe.   10/29: Follows up for bilateral feet. No pain or issues. Is up to full activity now, golfing and working. No draining sites. Does note that his left foot cracks from time to time. Otherwise denies N/F/V/C/D.         Review of Systems:  A 10 point review of systems was performed and is positive for that noted above in the patient history.  All other areas are negative.     PAST MEDICAL HISTORY:   Past Medical History:   Diagnosis Date    Antiplatelet or antithrombotic long-term use     Ascending aorta dilatation (H)     Cerebral artery occlusion with cerebral infarction (H)     Cerebral infarction (H)     2010    Gastroesophageal reflux disease with esophagitis     H/O blood clots 2022    Hyperlipidemia LDL goal <70     Hypertension     Nonalcoholic steatohepatitis 11/30/2022    Numbness and tingling     Obesity     GILA (obstructive sleep apnea) 10/22/2018    CPAP  intolerant    PVD (peripheral vascular disease) (H)     s/p left partial toe amputation    Renal stone     Tremor     Type 2 diabetes mellitus with diabetic polyneuropathy, with long-term current use of insulin (H)         PAST SURGICAL HISTORY:   Past Surgical History:   Procedure Laterality Date    AMPUTATE FOOT Left 8/18/2016    Procedure: AMPUTATE FOOT;  Surgeon: Jack Younger DPM;  Location: [facility]    AMPUTATE TOE(S) Right 2/1/2016    Procedure: AMPUTATE TOE(S);  Surgeon: Rachelle Manriquez DPM, Pod;  Location: [facility]    AMPUTATE TOE(S) Right 8/2/2019    Procedure: Right fourth toe partial amputation for treatment of osteomyelitis.;  Surgeon: Jack Younger DPM;  Location: [facility]    AMPUTATE TOE(S) Left 11/8/2019    Procedure: Left second toe amputation at the metatarsophalangeal joint.;  Surgeon: Rachelle Manriquez DPM, Podiatry/Foot and Ankle Surgery;  Location: [facility]    AMPUTATE TOE(S) Right 6/5/2022    Procedure: AMPUTATION OF RIGHT GREAT TOE;  Surgeon: Wili Quiles DPM;  Location: [facility]    AMPUTATE TOE(S) Right 7/28/2022    Procedure: Right foot partial first metatarsal resection;  Surgeon: Tiny Soto DPM;  Location: [facility]    AMPUTATE TOE(S) Left 6/18/2024    Procedure: Left foot transmetatarsal amputation;  Surgeon: Tiny Soto DPM;  Location: [facility]    ANGIOGRAM      BIOPSY BONE FOOT Right 7/24/2022    Procedure: Bone biopsy, right first metatarsal.;  Surgeon: Valentino Molina DPM;  Location: [facility]    COLONOSCOPY      COMBINED CYSTOSCOPY, RETROGRADES, URETEROSCOPY, INSERT STENT Left 8/21/2016    Procedure: COMBINED CYSTOSCOPY, RETROGRADES, URETEROSCOPY, INSERT STENT;  Surgeon: Artemio Valenzuela MD;  Location: [facility]    COMBINED CYSTOSCOPY, RETROGRADES, URETEROSCOPY, LASER HOLMIUM LITHOTRIPSY URETER(S), INSERT STENT Left 10/3/2016    Procedure: COMBINED CYSTOSCOPY, RETROGRADES, URETEROSCOPY, LASER HOLMIUM LITHOTRIPSY URETER(S), INSERT STENT;  Surgeon: Artemio Valenzuela  MD Claude;  Location: [facility]    EXTRACORPOREAL SHOCK WAVE LITHOTRIPSY (ESWL) Left 9/8/2016    Procedure: EXTRACORPOREAL SHOCK WAVE LITHOTRIPSY (ESWL);  Surgeon: Artemio Valenzuela MD;  Location: [facility]    INCISION AND DRAINAGE FOOT, COMBINED Right 7/24/2022    Procedure: Incision and drainage, right foot ;  Surgeon: Valentino Moilna DPM;  [facility]    IRRIGATION AND DEBRIDEMENT FOOT, COMBINED Left 10/19/2023    Procedure: Left foot second metatarsal resection , . Left plantar ulcer excisional debridement, down to and including tendon, with closure, site measuring 4 cm x 2 cm x 1 cm;  Surgeon: Tiny Soto DPM;  [facility]    IRRIGATION AND DEBRIDEMENT HAND, COMBINED Right 7/30/2024    Procedure: Irrigation and Debridement of Right Dorsal Hand and wrist;  Surgeon: June Mcdaniels MD;  [facility]    LAPAROSCOPIC CHOLECYSTECTOMY N/A 9/6/2024    Procedure: Laparoscopic cholecystectomy;  Surgeon: Aakash Meraz MD;  [facility]: [facility]    OSTEOTOMY FOOT Right 11/30/2022    Procedure: 1. Right second metatarsal floating osteotomy  2. Right second digit proximal phalanx arthroplasty 3. Right percutaneous flexor tenotomy;  Surgeon: Tiny Soto DPM;  Location: [facility]    OSTEOTOMY FOOT Right 1/19/2023    Procedure: Right foot third metatarsal head excision, ulcer debridement, matatarsal osteotomies;  Surgeon: Tiny Soto DPM;  Location: [facility]    RECESSION GASTROCNEMIUS Right 2/1/2016    Procedure: RECESSION GASTROCNEMIUS;  Surgeon: Rachelle Manriquez DPM, Pod;  [facility]: [facility]        MEDICATIONS:  Reviewed in Epic.     ALLERGIES:    Allergies   Allergen Reactions    Bactrim [Sulfamethoxazole-Trimethoprim] Nausea and Vomiting    Statins Swelling     Other reaction(s): Other (see comments)  SIMCOR caused edema in extremities  Only Simvastatin    Sulfatolamide Nausea and Vomiting    Niacin Swelling     SIMCOR caused edema in extremities    Simvastatin Swelling     SIMCOR  caused edema in extremities    Sulfa Antibiotics Nausea        SOCIAL HISTORY:   Social History     Socioeconomic History    Marital status:      Spouse name: Sydney    Number of children: 2    Years of education: Not on file    Highest education level: Master's degree (e.g., MA, MS, [place], MEd, MSW, ELIANA)   Occupational History    Occupation: marketing     Employer: [employer]     Comment: [employer] ([employer])   Tobacco Use    Smoking status: Never    Smokeless tobacco: Never   Vaping Use    Vaping status: Never Used   Substance and Sexual Activity    Alcohol use: Yes     Comment: rare---red wine 3x per month    Drug use: Never    Sexual activity: Not Currently     Partners: Female   Other Topics Concern    Parent/sibling w/ CABG, MI or angioplasty before 65F 55M? No   Social History Narrative    Not on file     Social Drivers of Health     Financial Resource Strain: Low Risk  (9/4/2024)    Financial Resource Strain     Within the past 12 months, have you or your family members you live with been unable to get utilities (heat, electricity) when it was really needed?: No   Food Insecurity: Low Risk  (9/4/2024)    Food Insecurity     Within the past 12 months, did you worry that your food would run out before you got money to buy more?: No     Within the past 12 months, did the food you bought just not last and you didn t have money to get more?: No   Transportation Needs: Low Risk  (9/4/2024)    Transportation Needs     Within the past 12 months, has lack of transportation kept you from medical appointments, getting your medicines, non-medical meetings or appointments, work, or from getting things that you need?: No   Physical Activity: Sufficiently Active (7/5/2024)    Exercise Vital Sign     Days of Exercise per Week: 6 days     Minutes of Exercise per Session: 80 min   Stress: No Stress Concern Present (7/5/2024)    Citizen of [place] of Occupational Health - Occupational Stress Questionnaire     Feeling of Stress :  "Not at all   Social Connections: Unknown (7/5/2024)    Social Connection and Isolation Panel [NHANES]     Frequency of Communication with Friends and Family: Not on file     Frequency of Social Gatherings with Friends and Family: More than three times a week     Attends Confucianist Services: Not on file     Active Member of Clubs or Organizations: Not on file     Attends Club or Organization Meetings: Not on file     Marital Status: Not on file   Interpersonal Safety: Low Risk  (9/6/2024)    Interpersonal Safety     Do you feel physically and emotionally safe where you currently live?: Yes     Within the past 12 months, have you been hit, slapped, kicked or otherwise physically hurt by someone?: No     Within the past 12 months, have you been humiliated or emotionally abused in other ways by your partner or ex-partner?: No   Housing Stability: Low Risk  (9/4/2024)    Housing Stability     Do you have housing? : Yes     Are you worried about losing your housing?: No        FAMILY HISTORY:   Family History   Problem Relation Age of Onset    Arthritis Mother         SLE    Connective Tissue Disorder Mother         lupus    Diabetes Mother     Cerebrovascular Disease Mother     Cancer Mother     Diabetes Father     Coronary Artery Disease Father     Chronic Obstructive Pulmonary Disease Father     Diabetes Sister     Diabetes Maternal Grandfather         EXAM:Vitals: Ht 1.816 m (5' 11.5\")   Wt 106.1 kg (234 lb)   BMI 32.18 kg/m    BMI= Body mass index is 32.18 kg/m .      General appearance: Patient is alert and fully cooperative with history & exam.  No sign of distress is noted during the visit.      Respiratory: Breathing is regular & unlabored while sitting.      HEENT: Hearing is intact to spoken word.  Speech is clear.  No gross evidence of visual impairment that would impact ambulation.      Dermatologic: Left foot TMA site: well closed and healed. No open lesions.   Right foot also evaluated: no open lesions, " preulcerative lesions or signs of infection.      Vascular: Dorsalis pedis and posterior tibial pulses are intact & regular bilaterally.  CFT and skin temperature is normal to both lower extremities.      Neurologic: Lower extremity sensation is diminished, bilateral foot, to light touch.  No evidence of neurological-based weakness or contracture in the lower extremities.       Musculoskeletal: Patient is ambulatory without an assistive device or brace.  S/p TMA on left foot.     Psychiatric: Affect is pleasant & appropriate.          Final Diagnosis   A.  Left forefoot, amputation:  -Surgical absence of the great and second toe  -Distal volar surface with deep ulcer with adjacent active, purulent osteomyelitis  -Tissues designated as margins without evidence of osteomyelitis     E.  Left foot, third proximal metatarsal, excision:  -No evidence of osteomyelitis       Foot, Left; Tissue   1 Result Note  Culture 1+ Staphylococcus caprae Abnormal    Identification obtained by MALDI-TOF mass spectrometry research use only database. Test characteristics determined and verified by the [facility].   1+ Staphylococcus simulans Abnormal    Susceptibilities not routinely done, refer to antibiogram to view typical susceptibility profiles      This specimen was received on a swab. Results may not be optimal. For maximum sensitivity of detection submit tissue, fluid or needle aspirate.        Resulting Agency: [facility]     Susceptibility     Staphylococcus caprae     JEFFREY     Ciprofloxacin <=0.5 ug/mL Susceptible     Clindamycin 0.25 ug/mL Susceptible     Daptomycin 0.5 ug/mL Susceptible     Doxycycline <=0.5 ug/mL Susceptible     Erythromycin <=0.25 ug/mL Susceptible     Gentamicin <=0.5 ug/mL Susceptible     Levofloxacin 0.25 ug/mL Susceptible     Oxacillin <=0.25 ug/mL Susceptible 1     Tetracycline <=1 ug/mL Susceptible     Trimethoprim/Sulfamethoxazole 1/19 ug/mL Susceptible     Vancomycin 1 ug/mL  "Susceptible              IMAGING:     IMPRESSION: Normal first through fifth transmetatarsal amputation.  Soft tissue swelling. No acute fracture.    I personally reviewed the images and agree with the reports as stated.      ASSESSMENT:  Left foot TMA done on 6/18/24 --> now healed   Right foot submet 2 callus --> not open  Diabetes Mellitus --> hba1c 6.8     MEDICAL DECISION MAKING:   -Discussed all findings with patient. Chart and imaging reviewed.   -No open lesions to bilateral feet. Patient to full activity and is doing well. Does note some \"cracking\" from time to time in the arch of his left foot. States nonpainful or swelling.   -Right foot callus evaluated, no open lesions to site.   -Patient asks about CMOs. He's been using them and things are going well. Asks about more inserts. His insurance states a form can be filled out for this. A letter of medical necessity was written for patient. No other other form exists that I know of.   -Otherwise, no new issues. Follow up in 6 weeks for continued maintenance if needed. Otherwise should evaluate daily for blisters, calluses or open lesions and follow up sooner if needed.         Tiny Soto DPM   [place] [facility]              "

## 2024-11-12 ENCOUNTER — TRANSFERRED RECORDS (OUTPATIENT)
Dept: HEALTH INFORMATION MANAGEMENT | Facility: CLINIC | Age: 64
End: 2024-11-12
Payer: COMMERCIAL

## 2024-11-25 ENCOUNTER — MEDICAL CORRESPONDENCE (OUTPATIENT)
Dept: HEALTH INFORMATION MANAGEMENT | Facility: CLINIC | Age: 64
End: 2024-11-25
Payer: COMMERCIAL

## 2024-12-18 ENCOUNTER — HOSPITAL ENCOUNTER (OUTPATIENT)
Dept: ULTRASOUND IMAGING | Facility: CLINIC | Age: 64
Discharge: HOME | End: 2024-12-18
Attending: INTERNAL MEDICINE
Payer: COMMERCIAL

## 2024-12-18 ENCOUNTER — OFFICE VISIT (OUTPATIENT)
Dept: PODIATRY | Facility: CLINIC | Age: 64
End: 2024-12-18
Payer: COMMERCIAL

## 2024-12-18 VITALS — DIASTOLIC BLOOD PRESSURE: 62 MMHG | BODY MASS INDEX: 33.14 KG/M2 | WEIGHT: 241 LBS | SYSTOLIC BLOOD PRESSURE: 131 MMHG

## 2024-12-18 DIAGNOSIS — K74.60 LIVER CIRRHOSIS SECONDARY TO NASH (H): ICD-10-CM

## 2024-12-18 DIAGNOSIS — L84 CALLUS OF FOOT: ICD-10-CM

## 2024-12-18 DIAGNOSIS — K75.81 LIVER CIRRHOSIS SECONDARY TO NASH (H): ICD-10-CM

## 2024-12-18 DIAGNOSIS — Z98.890 S/P FOOT SURGERY, RIGHT: ICD-10-CM

## 2024-12-18 DIAGNOSIS — Z89.432 S/P TRANSMETATARSAL AMPUTATION OF FOOT, LEFT (H): Primary | ICD-10-CM

## 2024-12-18 PROCEDURE — 99213 OFFICE O/P EST LOW 20 MIN: CPT | Performed by: PODIATRIST

## 2024-12-18 PROCEDURE — 76705 ECHO EXAM OF ABDOMEN: CPT

## 2024-12-18 NOTE — LETTER
12/18/2024      Crispin Rojas  [address] [facility] [address]      Dear Colleague,    Thank you for referring your patient, Crispin Rojas, to the [facility]. Please see a copy of my visit note below.     [facility]/FOOT & ANKLE SURGERY  CLINIC NOTE    CHIEF COMPLAINT:  left foot    PATIENT HISTORY:  Crispin Rojas is a 64 year old male  who presents for follow up for his left foot. Had a TMA on 6/18/2024. Site healed uneventfully. He was also seen in the hospital when he was admitted for a hand infection. At that time, there was no open lesions to his left foot, but there was a small callus to the plantar first metatarsal. He was advised of this and to slowly increase activity. Since then he hiked three miles and states the submet one site started draining. States now the site has been dry. Is now back to work. Denies pain to the site. Denies N/F/V/C/D.   9/11: Follows up for his left foot. States no new issues to the foot and has been doing well. States the ulcer on the plantar site is totally healed. Was recently admitted for cholecystitis and had his gallbladder removed. Having ongoing abdominal pain which he states is improving. Currently, not wearing dressings on his feet. Is getting closer to full activity. Has CMO in tennis shoe.   10/29: Follows up for bilateral feet. No pain or issues. Is up to full activity now, golfing and working. No draining sites. Does note that his left foot cracks from time to time. Otherwise denies N/F/V/C/D.     12/18: Follows up for bilateral feet. No new issues or concerns. Has been up and is at full activity. States no sites are draining and feels well. Denies N/F/V/C/D.         Review of Systems:  A 10 point review of systems was performed and is positive for that noted above in the patient history.  All other areas are negative.     PAST MEDICAL HISTORY:   Past Medical History:   Diagnosis Date     Antiplatelet or  antithrombotic long-term use      Ascending aorta dilatation (H)      Cerebral artery occlusion with cerebral infarction (H)      Cerebral infarction (H)     2010     Gastroesophageal reflux disease with esophagitis      H/O blood clots 2022     Hyperlipidemia LDL goal <70      Hypertension      Nonalcoholic steatohepatitis 11/30/2022     Numbness and tingling      Obesity      GILA (obstructive sleep apnea) 10/22/2018    CPAP intolerant     PVD (peripheral vascular disease) (H)     s/p left partial toe amputation     Renal stone      Tremor      Type 2 diabetes mellitus with diabetic polyneuropathy, with long-term current use of insulin (H)         PAST SURGICAL HISTORY:   Past Surgical History:   Procedure Laterality Date     AMPUTATE FOOT Left 8/18/2016    Procedure: AMPUTATE FOOT;  Surgeon: Jack Younger DPM;  Location: [facility]     AMPUTATE TOE(S) Right 2/1/2016    Procedure: AMPUTATE TOE(S);  Surgeon: Rachelle Manriquez DPM, Pod;  Location: [facility]     AMPUTATE TOE(S) Right 8/2/2019    Procedure: Right fourth toe partial amputation for treatment of osteomyelitis.;  Surgeon: Jack Younger DPM;  Location: [facility]     AMPUTATE TOE(S) Left 11/8/2019    Procedure: Left second toe amputation at the metatarsophalangeal joint.;  Surgeon: Rachelle Manriquez DPM, Podiatry/Foot and Ankle Surgery;  Location: [facility]     AMPUTATE TOE(S) Right 6/5/2022    Procedure: AMPUTATION OF RIGHT GREAT TOE;  Surgeon: Wili Quiles DPM;  Location: [facility]     AMPUTATE TOE(S) Right 7/28/2022    Procedure: Right foot partial first metatarsal resection;  Surgeon: Tiny Soto DPM;  Location: [facility]     AMPUTATE TOE(S) Left 6/18/2024    Procedure: Left foot transmetatarsal amputation;  Surgeon: Tiny Soto DPM;  Location: [facility]     ANGIOGRAM       BIOPSY BONE FOOT Right 7/24/2022    Procedure: Bone biopsy, right first metatarsal.;  Surgeon: Valentino Molina DPM;  Location: [facility]     COLONOSCOPY       COMBINED  CYSTOSCOPY, RETROGRADES, URETEROSCOPY, INSERT STENT Left 8/21/2016    Procedure: COMBINED CYSTOSCOPY, RETROGRADES, URETEROSCOPY, INSERT STENT;  Surgeon: Artemio Valenzuela MD;  Location: [facility]     COMBINED CYSTOSCOPY, RETROGRADES, URETEROSCOPY, LASER HOLMIUM LITHOTRIPSY URETER(S), INSERT STENT Left 10/3/2016    Procedure: COMBINED CYSTOSCOPY, RETROGRADES, URETEROSCOPY, LASER HOLMIUM LITHOTRIPSY URETER(S), INSERT STENT;  Surgeon: Artemio Valenzuela MD;  [facility]     EXTRACORPOREAL SHOCK WAVE LITHOTRIPSY (ESWL) Left 9/8/2016    Procedure: EXTRACORPOREAL SHOCK WAVE LITHOTRIPSY (ESWL);  Surgeon: Artemio Valenzuela MD;  Location: [facility]     INCISION AND DRAINAGE FOOT, COMBINED Right 7/24/2022    Procedure: Incision and drainage, right foot ;  Surgeon: Valentino Molina DPM;  Location: [facility]     IRRIGATION AND DEBRIDEMENT FOOT, COMBINED Left 10/19/2023    Procedure: Left foot second metatarsal resection , . Left plantar ulcer excisional debridement, down to and including tendon, with closure, site measuring 4 cm x 2 cm x 1 cm;  Surgeon: Tiny Soto DPM;  Location: [facility]     IRRIGATION AND DEBRIDEMENT HAND, COMBINED Right 7/30/2024    Procedure: Irrigation and Debridement of Right Dorsal Hand and wrist;  Surgeon: June Mcdaniels MD;  [facility]: [facility]     LAPAROSCOPIC CHOLECYSTECTOMY N/A 9/6/2024    Procedure: Laparoscopic cholecystectomy;  Surgeon: Aakash Meraz MD;  [facility]: [facility]     OSTEOTOMY FOOT Right 11/30/2022    Procedure: 1. Right second metatarsal floating osteotomy  2. Right second digit proximal phalanx arthroplasty 3. Right percutaneous flexor tenotomy;  Surgeon: Tiny Soto DPM;  Location: [facility]     OSTEOTOMY FOOT Right 1/19/2023    Procedure: Right foot third metatarsal head excision, ulcer debridement, matatarsal osteotomies;  Surgeon: Tiny Soto DPM;  Location: [facility]     RECESSION GASTROCNEMIUS Right 2/1/2016    Procedure: RECESSION  GASTROCNEMIUS;  Surgeon: Rachelle Manriquez, DPM, Pod;  Location: [facility]        MEDICATIONS:  Reviewed in Epic.     ALLERGIES:    Allergies   Allergen Reactions     Bactrim [Sulfamethoxazole-Trimethoprim] Nausea and Vomiting     Statins Swelling     Other reaction(s): Other (see comments)  SIMCOR caused edema in extremities  Only Simvastatin     Sulfatolamide Nausea and Vomiting     Niacin Swelling     SIMCOR caused edema in extremities     Simvastatin Swelling     SIMCOR caused edema in extremities     Sulfa Antibiotics Nausea        SOCIAL HISTORY:   Social History     Socioeconomic History     Marital status:      Spouse name: Sydney     Number of children: 2     Years of education: Not on file     Highest education level: Master's degree (e.g., MA, MS, [place], MEd, MSW, ELIANA)   Occupational History     Occupation: marketing     Employer: [employer]     Comment: [employer]   Tobacco Use     Smoking status: Never     Smokeless tobacco: Never   Vaping Use     Vaping status: Never Used   Substance and Sexual Activity     Alcohol use: Yes     Comment: rare---red wine 3x per month     Drug use: Never     Sexual activity: Not Currently     Partners: Female   Other Topics Concern     Parent/sibling w/ CABG, MI or angioplasty before 65F 55M? No   Social History Narrative     Not on file     Social Drivers of Health     Financial Resource Strain: Low Risk  (9/4/2024)    Financial Resource Strain      Within the past 12 months, have you or your family members you live with been unable to get utilities (heat, electricity) when it was really needed?: No   Food Insecurity: Low Risk  (9/4/2024)    Food Insecurity      Within the past 12 months, did you worry that your food would run out before you got money to buy more?: No      Within the past 12 months, did the food you bought just not last and you didn t have money to get more?: No   Transportation Needs: Low Risk  (9/4/2024)    Transportation Needs      Within the past 12  months, has lack of transportation kept you from medical appointments, getting your medicines, non-medical meetings or appointments, work, or from getting things that you need?: No   Physical Activity: Sufficiently Active (7/5/2024)    Exercise Vital Sign      Days of Exercise per Week: 6 days      Minutes of Exercise per Session: 80 min   Stress: No Stress Concern Present (7/5/2024)    [employer] - Occupational Stress Questionnaire      Feeling of Stress : Not at all   Social Connections: Unknown (7/5/2024)    Social Connection and Isolation Panel [NHANES]      Frequency of Communication with Friends and Family: Not on file      Frequency of Social Gatherings with Friends and Family: More than three times a week      Attends Voodoo Services: Not on file      Active Member of Clubs or Organizations: Not on file      Attends Club or Organization Meetings: Not on file      Marital Status: Not on file   Interpersonal Safety: Low Risk  (9/6/2024)    Interpersonal Safety      Do you feel physically and emotionally safe where you currently live?: Yes      Within the past 12 months, have you been hit, slapped, kicked or otherwise physically hurt by someone?: No      Within the past 12 months, have you been humiliated or emotionally abused in other ways by your partner or ex-partner?: No   Housing Stability: Low Risk  (9/4/2024)    Housing Stability      Do you have housing? : Yes      Are you worried about losing your housing?: No        FAMILY HISTORY:   Family History   Problem Relation Age of Onset     Arthritis Mother         SLE     Connective Tissue Disorder Mother         lupus     Diabetes Mother      Cerebrovascular Disease Mother      Cancer Mother      Diabetes Father      Coronary Artery Disease Father      Chronic Obstructive Pulmonary Disease Father      Diabetes Sister      Diabetes Maternal Grandfather         EXAM:Vitals: /62   Wt 109.3 kg (241 lb)   BMI 33.14 kg/m     BMI= Body mass index is 33.14 kg/m .      General appearance: Patient is alert and fully cooperative with history & exam.  No sign of distress is noted during the visit.      Respiratory: Breathing is regular & unlabored while sitting.      HEENT: Hearing is intact to spoken word.  Speech is clear.  No gross evidence of visual impairment that would impact ambulation.      Dermatologic: Left foot TMA site: well closed and healed. No open lesions.   Right foot also evaluated: no open lesions, preulcerative lesions or signs of infection.      Vascular: Dorsalis pedis and posterior tibial pulses are intact & regular bilaterally.  CFT and skin temperature is normal to both lower extremities.      Neurologic: Lower extremity sensation is diminished, bilateral foot, to light touch.  No evidence of neurological-based weakness or contracture in the lower extremities.       Musculoskeletal: Patient is ambulatory without an assistive device or brace.  S/p TMA on left foot.     Psychiatric: Affect is pleasant & appropriate.          Final Diagnosis   A.  Left forefoot, amputation:  -Surgical absence of the great and second toe  -Distal volar surface with deep ulcer with adjacent active, purulent osteomyelitis  -Tissues designated as margins without evidence of osteomyelitis     E.  Left foot, third proximal metatarsal, excision:  -No evidence of osteomyelitis       Foot, Left; Tissue   1 Result Note  Culture 1+ Staphylococcus caprae Abnormal    Identification obtained by MALDI-TOF mass spectrometry research use only database. Test characteristics determined and verified by the [facility].   1+ Staphylococcus simulans Abnormal    Susceptibilities not routinely done, refer to antibiogram to view typical susceptibility profiles      This specimen was received on a swab. Results may not be optimal. For maximum sensitivity of detection submit tissue, fluid or needle aspirate.        Resulting Agency:  [facility]     Susceptibility     Staphylococcus caprae     JEFFREY     Ciprofloxacin <=0.5 ug/mL Susceptible     Clindamycin 0.25 ug/mL Susceptible     Daptomycin 0.5 ug/mL Susceptible     Doxycycline <=0.5 ug/mL Susceptible     Erythromycin <=0.25 ug/mL Susceptible     Gentamicin <=0.5 ug/mL Susceptible     Levofloxacin 0.25 ug/mL Susceptible     Oxacillin <=0.25 ug/mL Susceptible 1     Tetracycline <=1 ug/mL Susceptible     Trimethoprim/Sulfamethoxazole 1/19 ug/mL Susceptible     Vancomycin 1 ug/mL Susceptible              IMAGING:     IMPRESSION: Normal first through fifth transmetatarsal amputation.  Soft tissue swelling. No acute fracture.    I personally reviewed the images and agree with the reports as stated.      ASSESSMENT:  Left foot TMA done on 6/18/24 --> now healed   Right foot submet 2 callus --> continues to be healed   Diabetes Mellitus --> hba1c 6.8     MEDICAL DECISION MAKING:   -Discussed all findings with patient. Chart and imaging reviewed.   -No open lesions to bilateral feet. Sites continue to remain well healed. Is at full activity and wearing his CMOs.   -Does states sometimes notices minimal ankle pain on left sided TMA, when walking a lot. Discussed possible high top ankle boot. Patient has worn this before and had no issues then.   -Cont to close monitor for any new issues or concerns: drainage, pain, blistering, significant calluses. None present today.   -Follow up in 3 months for ongoing surveillance. Call sooner if needed.         Tiny Soto DPM   [place] Department of Podiatry/Foot & Ankle Surgery                      Again, thank you for allowing me to participate in the care of your patient.        Sincerely,        Tiny Soto DPM

## 2024-12-18 NOTE — PATIENT INSTRUCTIONS
Thank you for choosing [facility] Podiatry / Foot & Ankle Surgery!    DR. SAGE'S LOCATIONS  [place] [facility] [place] APPOINTMENTS: [phone] [address] TRIAGE NURSE: [phone]   [place] [address] RADIOLOGY: [phone]   FAX: [phone] BILLING QUESTIONS: [phone]    CONSUMER PHILLIPS LINE:[phone]   [facility]    [id number]    [place] [address]    PH: [phone]    FAX: [phone]      Follow up:

## 2024-12-18 NOTE — PROGRESS NOTES
[facility]  CLINIC NOTE    CHIEF COMPLAINT:  left foot    PATIENT HISTORY:  Crispin Rojas is a 64 year old male  who presents for follow up for his left foot. Had a TMA on 6/18/2024. Site healed uneventfully. He was also seen in the hospital when he was admitted for a hand infection. At that time, there was no open lesions to his left foot, but there was a small callus to the plantar first metatarsal. He was advised of this and to slowly increase activity. Since then he hiked three miles and states the submet one site started draining. States now the site has been dry. Is now back to work. Denies pain to the site. Denies N/F/V/C/D.   9/11: Follows up for his left foot. States no new issues to the foot and has been doing well. States the ulcer on the plantar site is totally healed. Was recently admitted for cholecystitis and had his gallbladder removed. Having ongoing abdominal pain which he states is improving. Currently, not wearing dressings on his feet. Is getting closer to full activity. Has CMO in tennis shoe.   10/29: Follows up for bilateral feet. No pain or issues. Is up to full activity now, golfing and working. No draining sites. Does note that his left foot cracks from time to time. Otherwise denies N/F/V/C/D.     12/18: Follows up for bilateral feet. No new issues or concerns. Has been up and is at full activity. States no sites are draining and feels well. Denies N/F/V/C/D.         Review of Systems:  A 10 point review of systems was performed and is positive for that noted above in the patient history.  All other areas are negative.     PAST MEDICAL HISTORY:   Past Medical History:   Diagnosis Date    Antiplatelet or antithrombotic long-term use     Ascending aorta dilatation (H)     Cerebral artery occlusion with cerebral infarction (H)     Cerebral infarction (H)     2010    Gastroesophageal reflux disease with esophagitis     H/O blood clots 2022    Hyperlipidemia LDL  goal <70     Hypertension     Nonalcoholic steatohepatitis 11/30/2022    Numbness and tingling     Obesity     GILA (obstructive sleep apnea) 10/22/2018    CPAP intolerant    PVD (peripheral vascular disease) (H)     s/p left partial toe amputation    Renal stone     Tremor     Type 2 diabetes mellitus with diabetic polyneuropathy, with long-term current use of insulin (H)         PAST SURGICAL HISTORY:   Past Surgical History:   Procedure Laterality Date    AMPUTATE FOOT Left 8/18/2016    Procedure: AMPUTATE FOOT;  Surgeon: Jack Younger DPM;  Location: [facility]    AMPUTATE TOE(S) Right 2/1/2016    Procedure: AMPUTATE TOE(S);  Surgeon: Rachelle Manriquez DPM, Pod;  Location: [facility]    AMPUTATE TOE(S) Right 8/2/2019    Procedure: Right fourth toe partial amputation for treatment of osteomyelitis.;  Surgeon: Jack Younger DPM;  [facility]    AMPUTATE TOE(S) Left 11/8/2019    Procedure: Left second toe amputation at the metatarsophalangeal joint.;  Surgeon: Rachelle Manriquez DPM, Podiatry/Foot and Ankle Surgery;  Location: [facility]    AMPUTATE TOE(S) Right 6/5/2022    Procedure: AMPUTATION OF RIGHT GREAT TOE;  Surgeon: Wili Quiles DPM;  Location: [facility]    AMPUTATE TOE(S) Right 7/28/2022    Procedure: Right foot partial first metatarsal resection;  Surgeon: Tiny Soto DPM;  [facility]: [facility]    AMPUTATE TOE(S) Left 6/18/2024    Procedure: Left foot transmetatarsal amputation;  Surgeon: Tiny Soto DPM;  Location: [facility]    ANGIOGRAM      BIOPSY BONE FOOT Right 7/24/2022    Procedure: Bone biopsy, right first metatarsal.;  Surgeon: Valentino Molina DPM;  Location: [facility]    COLONOSCOPY      COMBINED CYSTOSCOPY, RETROGRADES, URETEROSCOPY, INSERT STENT Left 8/21/2016    Procedure: COMBINED CYSTOSCOPY, RETROGRADES, URETEROSCOPY, INSERT STENT;  Surgeon: Artemio Valenzuela MD;  Location: [facility]    COMBINED CYSTOSCOPY, RETROGRADES, URETEROSCOPY, LASER HOLMIUM LITHOTRIPSY URETER(S), INSERT  STENT Left 10/3/2016    Procedure: COMBINED CYSTOSCOPY, RETROGRADES, URETEROSCOPY, LASER HOLMIUM LITHOTRIPSY URETER(S), INSERT STENT;  Surgeon: Artemio Valenzuela MD;  [facility]    EXTRACORPOREAL SHOCK WAVE LITHOTRIPSY (ESWL) Left 9/8/2016    Procedure: EXTRACORPOREAL SHOCK WAVE LITHOTRIPSY (ESWL);  Surgeon: Artemio Valenzuela MD;  Location: [facility]    INCISION AND DRAINAGE FOOT, COMBINED Right 7/24/2022    Procedure: Incision and drainage, right foot ;  Surgeon: Valentino Molina DPM;  [facility]: [facility]    IRRIGATION AND DEBRIDEMENT FOOT, COMBINED Left 10/19/2023    Procedure: Left foot second metatarsal resection , . Left plantar ulcer excisional debridement, down to and including tendon, with closure, site measuring 4 cm x 2 cm x 1 cm;  Surgeon: Tiny Soto DPM;  [facility]    IRRIGATION AND DEBRIDEMENT HAND, COMBINED Right 7/30/2024    Procedure: Irrigation and Debridement of Right Dorsal Hand and wrist;  Surgeon: June Mcdaniels MD;  [facility]    LAPAROSCOPIC CHOLECYSTECTOMY N/A 9/6/2024    Procedure: Laparoscopic cholecystectomy;  Surgeon: Aakash Meraz MD;  [facility]    OSTEOTOMY FOOT Right 11/30/2022    Procedure: 1. Right second metatarsal floating osteotomy  2. Right second digit proximal phalanx arthroplasty 3. Right percutaneous flexor tenotomy;  Surgeon: Tiny Soto DPM;  [facility]    OSTEOTOMY FOOT Right 1/19/2023    Procedure: Right foot third metatarsal head excision, ulcer debridement, matatarsal osteotomies;  Surgeon: Tiny Soto DPM;  Location: [facility]    RECESSION GASTROCNEMIUS Right 2/1/2016    Procedure: RECESSION GASTROCNEMIUS;  Surgeon: Rachelle Manriquez DPM, Pod;  [facility]        MEDICATIONS:  Reviewed in Epic.     ALLERGIES:    Allergies   Allergen Reactions    Bactrim [Sulfamethoxazole-Trimethoprim] Nausea and Vomiting    Statins Swelling     Other reaction(s): Other (see comments)  SIMCOR caused edema  in extremities  Only Simvastatin    Sulfatolamide Nausea and Vomiting    Niacin Swelling     SIMCOR caused edema in extremities    Simvastatin Swelling     SIMCOR caused edema in extremities    Sulfa Antibiotics Nausea        SOCIAL HISTORY:   Social History     Socioeconomic History    Marital status:      Spouse name: Sydney    Number of children: 2    Years of education: Not on file    Highest education level: Master's degree (e.g., MA, MS, [place], MEd, MSW, ELIANA)   Occupational History    Occupation: marketing     Employer: [employer]     Comment: [employer]   Tobacco Use    Smoking status: Never    Smokeless tobacco: Never   Vaping Use    Vaping status: Never Used   Substance and Sexual Activity    Alcohol use: Yes     Comment: rare---red wine 3x per month    Drug use: Never    Sexual activity: Not Currently     Partners: Female   Other Topics Concern    Parent/sibling w/ CABG, MI or angioplasty before 65F 55M? No   Social History Narrative    Not on file     Social Drivers of Health     Financial Resource Strain: Low Risk  (9/4/2024)    Financial Resource Strain     Within the past 12 months, have you or your family members you live with been unable to get utilities (heat, electricity) when it was really needed?: No   Food Insecurity: Low Risk  (9/4/2024)    Food Insecurity     Within the past 12 months, did you worry that your food would run out before you got money to buy more?: No     Within the past 12 months, did the food you bought just not last and you didn t have money to get more?: No   Transportation Needs: Low Risk  (9/4/2024)    Transportation Needs     Within the past 12 months, has lack of transportation kept you from medical appointments, getting your medicines, non-medical meetings or appointments, work, or from getting things that you need?: No   Physical Activity: Sufficiently Active (7/5/2024)    Exercise Vital Sign     Days of Exercise per Week: 6 days     Minutes of Exercise per  Session: 80 min   Stress: No Stress Concern Present (7/5/2024)    [employer] - Occupational Stress Questionnaire     Feeling of Stress : Not at all   Social Connections: Unknown (7/5/2024)    Social Connection and Isolation Panel [NHANES]     Frequency of Communication with Friends and Family: Not on file     Frequency of Social Gatherings with Friends and Family: More than three times a week     Attends Muslim Services: Not on file     Active Member of Clubs or Organizations: Not on file     Attends Club or Organization Meetings: Not on file     Marital Status: Not on file   Interpersonal Safety: Low Risk  (9/6/2024)    Interpersonal Safety     Do you feel physically and emotionally safe where you currently live?: Yes     Within the past 12 months, have you been hit, slapped, kicked or otherwise physically hurt by someone?: No     Within the past 12 months, have you been humiliated or emotionally abused in other ways by your partner or ex-partner?: No   Housing Stability: Low Risk  (9/4/2024)    Housing Stability     Do you have housing? : Yes     Are you worried about losing your housing?: No        FAMILY HISTORY:   Family History   Problem Relation Age of Onset    Arthritis Mother         SLE    Connective Tissue Disorder Mother         lupus    Diabetes Mother     Cerebrovascular Disease Mother     Cancer Mother     Diabetes Father     Coronary Artery Disease Father     Chronic Obstructive Pulmonary Disease Father     Diabetes Sister     Diabetes Maternal Grandfather         EXAM:Vitals: /62   Wt 109.3 kg (241 lb)   BMI 33.14 kg/m    BMI= Body mass index is 33.14 kg/m .      General appearance: Patient is alert and fully cooperative with history & exam.  No sign of distress is noted during the visit.      Respiratory: Breathing is regular & unlabored while sitting.      HEENT: Hearing is intact to spoken word.  Speech is clear.  No gross evidence of visual impairment  that would impact ambulation.      Dermatologic: Left foot TMA site: well closed and healed. No open lesions.   Right foot also evaluated: no open lesions, preulcerative lesions or signs of infection.      Vascular: Dorsalis pedis and posterior tibial pulses are intact & regular bilaterally.  CFT and skin temperature is normal to both lower extremities.      Neurologic: Lower extremity sensation is diminished, bilateral foot, to light touch.  No evidence of neurological-based weakness or contracture in the lower extremities.       Musculoskeletal: Patient is ambulatory without an assistive device or brace.  S/p TMA on left foot.     Psychiatric: Affect is pleasant & appropriate.          Final Diagnosis   A.  Left forefoot, amputation:  -Surgical absence of the great and second toe  -Distal volar surface with deep ulcer with adjacent active, purulent osteomyelitis  -Tissues designated as margins without evidence of osteomyelitis     E.  Left foot, third proximal metatarsal, excision:  -No evidence of osteomyelitis       Foot, Left; Tissue   1 Result Note  Culture 1+ Staphylococcus caprae Abnormal    Identification obtained by MALDI-TOF mass spectrometry research use only database. Test characteristics determined and verified by the [facility].   1+ Staphylococcus simulans Abnormal    Susceptibilities not routinely done, refer to antibiogram to view typical susceptibility profiles      This specimen was received on a swab. Results may not be optimal. For maximum sensitivity of detection submit tissue, fluid or needle aspirate.        Resulting Agency: [facility]     Susceptibility     Staphylococcus caprae     JEFFREY     Ciprofloxacin <=0.5 ug/mL Susceptible     Clindamycin 0.25 ug/mL Susceptible     Daptomycin 0.5 ug/mL Susceptible     Doxycycline <=0.5 ug/mL Susceptible     Erythromycin <=0.25 ug/mL Susceptible     Gentamicin <=0.5 ug/mL Susceptible     Levofloxacin 0.25 ug/mL Susceptible      Oxacillin <=0.25 ug/mL Susceptible 1     Tetracycline <=1 ug/mL Susceptible     Trimethoprim/Sulfamethoxazole 1/19 ug/mL Susceptible     Vancomycin 1 ug/mL Susceptible              IMAGING:     IMPRESSION: Normal first through fifth transmetatarsal amputation.  Soft tissue swelling. No acute fracture.    I personally reviewed the images and agree with the reports as stated.      ASSESSMENT:  Left foot TMA done on 6/18/24 --> now healed   Right foot submet 2 callus --> continues to be healed   Diabetes Mellitus --> hba1c 6.8     MEDICAL DECISION MAKING:   -Discussed all findings with patient. Chart and imaging reviewed.   -No open lesions to bilateral feet. Sites continue to remain well healed. Is at full activity and wearing his CMOs.   -Does states sometimes notices minimal ankle pain on left sided TMA, when walking a lot. Discussed possible high top ankle boot. Patient has worn this before and had no issues then.   -Cont to close monitor for any new issues or concerns: drainage, pain, blistering, significant calluses. None present today.   -Follow up in 3 months for ongoing surveillance. Call sooner if needed.         Tiny Soto DPM   [place] [facility]

## 2024-12-19 ENCOUNTER — TRANSFERRED RECORDS (OUTPATIENT)
Dept: HEALTH INFORMATION MANAGEMENT | Facility: CLINIC | Age: 64
End: 2024-12-19
Payer: COMMERCIAL

## 2024-12-19 ENCOUNTER — TRANSFERRED RECORDS (OUTPATIENT)
Dept: SURGERY | Facility: CLINIC | Age: 64
End: 2024-12-19
Payer: COMMERCIAL

## 2025-01-06 ENCOUNTER — OFFICE VISIT (OUTPATIENT)
Dept: FAMILY MEDICINE | Facility: CLINIC | Age: 65
End: 2025-01-06
Payer: COMMERCIAL

## 2025-01-06 VITALS
DIASTOLIC BLOOD PRESSURE: 75 MMHG | OXYGEN SATURATION: 97 % | WEIGHT: 233 LBS | HEART RATE: 54 BPM | SYSTOLIC BLOOD PRESSURE: 127 MMHG | HEIGHT: 71 IN | TEMPERATURE: 97.5 F | BODY MASS INDEX: 32.62 KG/M2 | RESPIRATION RATE: 16 BRPM

## 2025-01-06 DIAGNOSIS — E78.5 HYPERLIPIDEMIA LDL GOAL <70: ICD-10-CM

## 2025-01-06 DIAGNOSIS — L97.509 TYPE 2 DIABETES MELLITUS WITH FOOT ULCER, WITH LONG-TERM CURRENT USE OF INSULIN (H): Primary | ICD-10-CM

## 2025-01-06 DIAGNOSIS — R79.89 LOW SERUM TESTOSTERONE: ICD-10-CM

## 2025-01-06 DIAGNOSIS — Z79.899 ON STATIN THERAPY: ICD-10-CM

## 2025-01-06 DIAGNOSIS — Z12.5 ENCOUNTER FOR SCREENING FOR MALIGNANT NEOPLASM OF PROSTATE: ICD-10-CM

## 2025-01-06 DIAGNOSIS — R19.7 DIARRHEA, UNSPECIFIED TYPE: ICD-10-CM

## 2025-01-06 DIAGNOSIS — Z00.00 ROUTINE GENERAL MEDICAL EXAMINATION AT A HEALTH CARE FACILITY: ICD-10-CM

## 2025-01-06 DIAGNOSIS — R53.83 FATIGUE, UNSPECIFIED TYPE: ICD-10-CM

## 2025-01-06 DIAGNOSIS — Z79.4 TYPE 2 DIABETES MELLITUS WITH FOOT ULCER, WITH LONG-TERM CURRENT USE OF INSULIN (H): Primary | ICD-10-CM

## 2025-01-06 DIAGNOSIS — E11.621 TYPE 2 DIABETES MELLITUS WITH FOOT ULCER, WITH LONG-TERM CURRENT USE OF INSULIN (H): Primary | ICD-10-CM

## 2025-01-06 LAB
ALBUMIN SERPL BCG-MCNC: 4.2 G/DL (ref 3.5–5.2)
ALP SERPL-CCNC: 63 U/L (ref 40–150)
ALT SERPL W P-5'-P-CCNC: 35 U/L (ref 0–70)
ANION GAP SERPL CALCULATED.3IONS-SCNC: 10 MMOL/L (ref 7–15)
AST SERPL W P-5'-P-CCNC: 34 U/L (ref 0–45)
BASOPHILS # BLD AUTO: 0 10E3/UL (ref 0–0.2)
BASOPHILS NFR BLD AUTO: 1 %
BILIRUB SERPL-MCNC: 0.3 MG/DL
BUN SERPL-MCNC: 22.4 MG/DL (ref 8–23)
CALCIUM SERPL-MCNC: 9.5 MG/DL (ref 8.8–10.4)
CHLORIDE SERPL-SCNC: 108 MMOL/L (ref 98–107)
CHOLEST SERPL-MCNC: 97 MG/DL
CREAT SERPL-MCNC: 1.05 MG/DL (ref 0.67–1.17)
CREAT UR-MCNC: 161 MG/DL
EGFRCR SERPLBLD CKD-EPI 2021: 80 ML/MIN/1.73M2
EOSINOPHIL # BLD AUTO: 0.3 10E3/UL (ref 0–0.7)
EOSINOPHIL NFR BLD AUTO: 5 %
ERYTHROCYTE [DISTWIDTH] IN BLOOD BY AUTOMATED COUNT: 13.7 % (ref 10–15)
EST. AVERAGE GLUCOSE BLD GHB EST-MCNC: 154 MG/DL
GLUCOSE SERPL-MCNC: 110 MG/DL (ref 70–99)
HBA1C MFR BLD: 7 % (ref 0–5.6)
HCO3 SERPL-SCNC: 26 MMOL/L (ref 22–29)
HCT VFR BLD AUTO: 36 % (ref 40–53)
HDLC SERPL-MCNC: 35 MG/DL
HGB BLD-MCNC: 11.5 G/DL (ref 13.3–17.7)
HOLD SPECIMEN: NORMAL
HOLD SPECIMEN: NORMAL
IMM GRANULOCYTES # BLD: 0 10E3/UL
IMM GRANULOCYTES NFR BLD: 0 %
LDLC SERPL CALC-MCNC: 46 MG/DL
LYMPHOCYTES # BLD AUTO: 1.9 10E3/UL (ref 0.8–5.3)
LYMPHOCYTES NFR BLD AUTO: 33 %
MCH RBC QN AUTO: 28.5 PG (ref 26.5–33)
MCHC RBC AUTO-ENTMCNC: 31.9 G/DL (ref 31.5–36.5)
MCV RBC AUTO: 89 FL (ref 78–100)
MICROALBUMIN UR-MCNC: <12 MG/L
MICROALBUMIN/CREAT UR: NORMAL MG/G{CREAT}
MONOCYTES # BLD AUTO: 0.5 10E3/UL (ref 0–1.3)
MONOCYTES NFR BLD AUTO: 8 %
NEUTROPHILS # BLD AUTO: 3.1 10E3/UL (ref 1.6–8.3)
NEUTROPHILS NFR BLD AUTO: 53 %
NONHDLC SERPL-MCNC: 62 MG/DL
PLATELET # BLD AUTO: 163 10E3/UL (ref 150–450)
POTASSIUM SERPL-SCNC: 4.6 MMOL/L (ref 3.4–5.3)
PROT SERPL-MCNC: 7.4 G/DL (ref 6.4–8.3)
PSA SERPL DL<=0.01 NG/ML-MCNC: 0.47 NG/ML (ref 0–4.5)
RBC # BLD AUTO: 4.03 10E6/UL (ref 4.4–5.9)
SODIUM SERPL-SCNC: 144 MMOL/L (ref 135–145)
TRIGL SERPL-MCNC: 81 MG/DL
WBC # BLD AUTO: 5.8 10E3/UL (ref 4–11)

## 2025-01-06 PROCEDURE — 90632 HEPA VACCINE ADULT IM: CPT | Performed by: FAMILY MEDICINE

## 2025-01-06 PROCEDURE — G0103 PSA SCREENING: HCPCS | Performed by: FAMILY MEDICINE

## 2025-01-06 PROCEDURE — 90471 IMMUNIZATION ADMIN: CPT | Performed by: FAMILY MEDICINE

## 2025-01-06 PROCEDURE — 82570 ASSAY OF URINE CREATININE: CPT | Performed by: FAMILY MEDICINE

## 2025-01-06 PROCEDURE — 90480 ADMN SARSCOV2 VAC 1/ONLY CMP: CPT | Performed by: FAMILY MEDICINE

## 2025-01-06 PROCEDURE — 36415 COLL VENOUS BLD VENIPUNCTURE: CPT | Performed by: FAMILY MEDICINE

## 2025-01-06 PROCEDURE — 80061 LIPID PANEL: CPT | Performed by: FAMILY MEDICINE

## 2025-01-06 PROCEDURE — 80053 COMPREHEN METABOLIC PANEL: CPT | Performed by: FAMILY MEDICINE

## 2025-01-06 PROCEDURE — 85025 COMPLETE CBC W/AUTO DIFF WBC: CPT | Performed by: FAMILY MEDICINE

## 2025-01-06 PROCEDURE — 82043 UR ALBUMIN QUANTITATIVE: CPT | Performed by: FAMILY MEDICINE

## 2025-01-06 PROCEDURE — 83036 HEMOGLOBIN GLYCOSYLATED A1C: CPT | Performed by: FAMILY MEDICINE

## 2025-01-06 PROCEDURE — 91320 SARSCV2 VAC 30MCG TRS-SUC IM: CPT | Performed by: FAMILY MEDICINE

## 2025-01-06 PROCEDURE — 84403 ASSAY OF TOTAL TESTOSTERONE: CPT | Performed by: FAMILY MEDICINE

## 2025-01-06 PROCEDURE — 99396 PREV VISIT EST AGE 40-64: CPT | Mod: 25 | Performed by: FAMILY MEDICINE

## 2025-01-06 RX ORDER — SEMAGLUTIDE 2.68 MG/ML
2 INJECTION, SOLUTION SUBCUTANEOUS
COMMUNITY
Start: 2024-10-28

## 2025-01-06 SDOH — HEALTH STABILITY: PHYSICAL HEALTH: ON AVERAGE, HOW MANY DAYS PER WEEK DO YOU ENGAGE IN MODERATE TO STRENUOUS EXERCISE (LIKE A BRISK WALK)?: 6 DAYS

## 2025-01-06 SDOH — HEALTH STABILITY: PHYSICAL HEALTH: ON AVERAGE, HOW MANY MINUTES DO YOU ENGAGE IN EXERCISE AT THIS LEVEL?: 40 MIN

## 2025-01-06 ASSESSMENT — SOCIAL DETERMINANTS OF HEALTH (SDOH): HOW OFTEN DO YOU GET TOGETHER WITH FRIENDS OR RELATIVES?: ONCE A WEEK

## 2025-01-06 ASSESSMENT — ANXIETY QUESTIONNAIRES
6. BECOMING EASILY ANNOYED OR IRRITABLE: NOT AT ALL
5. BEING SO RESTLESS THAT IT IS HARD TO SIT STILL: NOT AT ALL
1. FEELING NERVOUS, ANXIOUS, OR ON EDGE: NOT AT ALL
GAD7 TOTAL SCORE: 0
7. FEELING AFRAID AS IF SOMETHING AWFUL MIGHT HAPPEN: NOT AT ALL
3. WORRYING TOO MUCH ABOUT DIFFERENT THINGS: NOT AT ALL
GAD7 TOTAL SCORE: 0
IF YOU CHECKED OFF ANY PROBLEMS ON THIS QUESTIONNAIRE, HOW DIFFICULT HAVE THESE PROBLEMS MADE IT FOR YOU TO DO YOUR WORK, TAKE CARE OF THINGS AT HOME, OR GET ALONG WITH OTHER PEOPLE: NOT DIFFICULT AT ALL
4. TROUBLE RELAXING: NOT AT ALL
8. IF YOU CHECKED OFF ANY PROBLEMS, HOW DIFFICULT HAVE THESE MADE IT FOR YOU TO DO YOUR WORK, TAKE CARE OF THINGS AT HOME, OR GET ALONG WITH OTHER PEOPLE?: NOT DIFFICULT AT ALL
2. NOT BEING ABLE TO STOP OR CONTROL WORRYING: NOT AT ALL
7. FEELING AFRAID AS IF SOMETHING AWFUL MIGHT HAPPEN: NOT AT ALL
GAD7 TOTAL SCORE: 0

## 2025-01-06 NOTE — PATIENT INSTRUCTIONS
Patient Education   Preventive Care Advice   This is general advice given by our system to help you stay healthy. However, your care team may have specific advice just for you. Please talk to your care team about your preventive care needs.  Nutrition  Eat 5 or more servings of fruits and vegetables each day.  Try wheat bread, brown rice and whole grain pasta (instead of white bread, rice, and pasta).  Get enough calcium and vitamin D. Check the label on foods and aim for 100% of the RDA (recommended daily allowance).  Lifestyle  Exercise at least 150 minutes each week  (30 minutes a day, 5 days a week).  Do muscle strengthening activities 2 days a week. These help control your weight and prevent disease.  No smoking.  Wear sunscreen to prevent skin cancer.  Have a dental exam and cleaning every 6 months.  Yearly exams  See your health care team every year to talk about:  Any changes in your health.  Any medicines your care team has prescribed.  Preventive care, family planning, and ways to prevent chronic diseases.  Shots (vaccines)   HPV shots (up to age 26), if you've never had them before.  Hepatitis B shots (up to age 59), if you've never had them before.  COVID-19 shot: Get this shot when it's due.  Flu shot: Get a flu shot every year.  Tetanus shot: Get a tetanus shot every 10 years.  Pneumococcal, hepatitis A, and RSV shots: Ask your care team if you need these based on your risk.  Shingles shot (for age 50 and up)  General health tests  Diabetes screening:  Starting at age 35, Get screened for diabetes at least every 3 years.  If you are younger than age 35, ask your care team if you should be screened for diabetes.  Cholesterol test: At age 39, start having a cholesterol test every 5 years, or more often if advised.  Bone density scan (DEXA): At age 50, ask your care team if you should have this scan for osteoporosis (brittle bones).  Hepatitis C: Get tested at least once in your life.  STIs (sexually  transmitted infections)  Before age 24: Ask your care team if you should be screened for STIs.  After age 24: Get screened for STIs if you're at risk. You are at risk for STIs (including HIV) if:  You are sexually active with more than one person.  You don't use condoms every time.  You or a partner was diagnosed with a sexually transmitted infection.  If you are at risk for HIV, ask about PrEP medicine to prevent HIV.  Get tested for HIV at least once in your life, whether you are at risk for HIV or not.  Cancer screening tests  Cervical cancer screening: If you have a cervix, begin getting regular cervical cancer screening tests starting at age 21.  Breast cancer scan (mammogram): If you've ever had breasts, begin having regular mammograms starting at age 40. This is a scan to check for breast cancer.  Colon cancer screening: It is important to start screening for colon cancer at age 45.  Have a colonoscopy test every 10 years (or more often if you're at risk) Or, ask your provider about stool tests like a FIT test every year or Cologuard test every 3 years.  To learn more about your testing options, visit:   .  For help making a decision, visit:   https://bit.ly/du36813.  Prostate cancer screening test: If you have a prostate, ask your care team if a prostate cancer screening test (PSA) at age 55 is right for you.  Lung cancer screening: If you are a current or former smoker ages 50 to 80, ask your care team if ongoing lung cancer screenings are right for you.  For informational purposes only. Not to replace the advice of your health care provider. Copyright   2023 [place] [employer]. All rights reserved. Clinically reviewed by the [facility] Transitions Program. [employer] [id number] - REV 01/24.

## 2025-01-06 NOTE — PROGRESS NOTES
"Preventive Care Visit  [facility] DO D[place], Family Medicine  Jan 6, 2025      Assessment & Plan   See after visit summary and result note for helpful information and advice given to patient.    Type 2 diabetes mellitus with foot ulcer, with long-term current use of insulin (H)    - Hemoglobin A1c  - Extra Tube  - Hemoglobin A1c  - Extra Tube  - Comprehensive metabolic panel  - Albumin Random Urine Quantitative with Creat Ratio    Diarrhea, unspecified type    - Enteric Bacteria and Virus Panel PCR  - C. difficile Toxin B PCR with reflex to C. difficile Antigen and Toxins A/B EIA  - CBC with Platelets & Differential  - Enteric Bacteria and Virus Panel PCR  - C. difficile Toxin B PCR with reflex to C. difficile Antigen and Toxins A/B EIA    Encounter for screening for malignant neoplasm of prostate    - Prostate Specific Antigen Screen    Fatigue, unspecified type    - Testosterone total    Hyperlipidemia LDL goal <70    - Lipid Profile    On statin therapy    - Comprehensive metabolic panel    Routine general medical examination at a health care facility    - COVID-19 12+ (PFIZER)  - HEPATITIS A 19+ (HAVRIX/VAQTA)  - REVIEW OF HEALTH MAINTENANCE PROTOCOL ORDERS              BMI  Estimated body mass index is 32.27 kg/m  as calculated from the following:    Height as of this encounter: 1.81 m (5' 11.25\").    Weight as of this encounter: 105.7 kg (233 lb).   Weight management plan: Discussed healthy diet and exercise guidelines    Counseling  Appropriate preventive services were addressed with this patient via screening, questionnaire, or discussion as appropriate for fall prevention, nutrition, physical activity, Tobacco-use cessation, social engagement, weight loss and cognition.  Checklist reviewing preventive services available has been given to the patient.  Reviewed patient's diet, addressing concerns and/or questions.           Subjective   Pat is a 64 year old, presenting for " the following:  Physical (PE) and Diabetes (Follow-up diabetes)        1/6/2025     7:24 AM   Additional Questions   Roomed by Shania CIFUENTES  Patient is seen for primary reason of preventive health visit.          Health Care Directive  Patient does not have a Health Care Directive: Patient states has Advance Directive and will bring in a copy to clinic.      1/6/2025   General Health   How would you rate your overall physical health? Good   Feel stress (tense, anxious, or unable to sleep) Not at all         1/6/2025   Nutrition   Three or more servings of calcium each day? Yes   Diet: Diabetic   How many servings of fruit and vegetables per day? (!) 2-3   How many sweetened beverages each day? 0-1         1/6/2025   Exercise   Days per week of moderate/strenous exercise 6 days   Average minutes spent exercising at this level 40 min         1/6/2025   Social Factors   Frequency of gathering with friends or relatives Once a week   Worry food won't last until get money to buy more No   Food not last or not have enough money for food? No   Do you have housing? (Housing is defined as stable permanent housing and does not include staying ouside in a car, in a tent, in an abandoned building, in an overnight shelter, or couch-surfing.) Yes   Are you worried about losing your housing? No   Lack of transportation? No   Unable to get utilities (heat,electricity)? No         1/6/2025   Fall Risk   Fallen 2 or more times in the past year? No   Trouble with walking or balance? No          1/6/2025   Dental   Dentist two times every year? Yes         7/5/2024   TB Screening   Were you born outside of the [place]? No         Today's PHQ-2 Score:       1/6/2025     7:27 AM   PHQ-2 ( 1999 [employer])   Q1: Little interest or pleasure in doing things 0   Q2: Feeling down, depressed or hopeless 0   PHQ-2 Score 0    Q1: Little interest or pleasure in doing things Not at all   Q2: Feeling down, depressed or hopeless Not at all  "  PHQ-2 Score 0       Patient-reported           1/6/2025   Substance Use   Alcohol more than 3/day or more than 7/wk No   Do you use any other substances recreationally? No     Social History     Tobacco Use    Smoking status: Never     Passive exposure: Never    Smokeless tobacco: Never   Vaping Use    Vaping status: Never Used   Substance Use Topics    Alcohol use: Yes     Comment: rare---red wine 3x per month    Drug use: Never           1/6/2025   STI Screening   New sexual partner(s) since last STI/HIV test? No   Last PSA:   PSA   Date Value Ref Range Status   12/05/2016 0.31 0 - 4 ug/L Final     Comment:     Assay Method:  Chemiluminescence using Siemens Vista analyzer     Prostate Specific Antigen Screen   Date Value Ref Range Status   01/31/2024 0.49 0.00 - 4.50 ng/mL Final   01/03/2022 0.44 0.00 - 4.00 ug/L Final     ASCVD Risk   The ASCVD Risk score (Rahul SEGURA, et al., 2019) failed to calculate for the following reasons:    Risk score cannot be calculated because patient has a medical history suggesting prior/existing ASCVD           Reviewed and updated as needed this visit by Provider                          Review of Systems  Constitutional, neuro, ENT, endocrine, pulmonary, cardiac, gastrointestinal, genitourinary, musculoskeletal, integument and psychiatric systems are negative, except as otherwise noted.    Has had diarrhea since just before Thanksgiving.     Has had two loose bowel movements/day.     Is fasting this exam.     Feels a little more fatigued this past Winter.  Considering this, we will check his testosterone level.     Objective    Exam  /75 (BP Location: Right arm, Patient Position: Chair, Cuff Size: Adult Large)   Pulse 54   Temp 97.5  F (36.4  C) (Oral)   Resp 16   Ht 1.81 m (5' 11.25\")   Wt 105.7 kg (233 lb)   SpO2 97%   BMI 32.27 kg/m     Estimated body mass index is 32.27 kg/m  as calculated from the following:    Height as of this encounter: 1.81 m (5' " "11.25\").    Weight as of this encounter: 105.7 kg (233 lb).    Physical Exam  General: Vital signs reviewed.  Patient is in no acute appearing distress.  Breathing appears nonlabored.  Patient is alert and oriented ×3.      ENT: Ear exam shows bilateral tympanic membranes to be clear without injection, nasal turbinates show no injection or edema, no pharyngeal injection or exudate.    Neck: supple with no adenoapthy, palpable abnormal masses, or thyroid abnormality.    Eyes: No scleral, lid, or periorbital injection or edema noted.  No eye mattering noted.  Corneas are clear. Pupils are equal round and reactive to light with normal consensual eye movement.    Heart: Heart rate is regular without murmur.    Lungs: Lungs are clear to auscultation with good airflow bilaterally.    Abdomen:  Abdomen is soft, nontender.  No palpable abnormal masses or organomegaly.  Bowel sounds are normal.    Genital exam: Patient declined exam for possible hernia.    Back: No areas of tenderness.    Skin: Warm and dry, with no rash or abnormal lesions noted.    Extremities: No lower leg edema noted.  No joint edema or restricted range of motion noted.    Neuro: No acute focal deficits or other abnormalities noted.    Psych: Patient is pleasant, making good eye contact, with clear and fluent speech.  Answers questions appropriately. No psychomotor agitation.        Signed Electronically by: Johann Serna DO    Answers submitted by the patient for this visit:  Patient Health Questionnaire (G7) (Submitted on 1/6/2025)  MICHELLE 7 TOTAL SCORE: 0    "

## 2025-01-07 ENCOUNTER — APPOINTMENT (OUTPATIENT)
Dept: LAB | Facility: CLINIC | Age: 65
End: 2025-01-07
Payer: COMMERCIAL

## 2025-01-07 LAB

## 2025-01-07 PROCEDURE — 87493 C DIFF AMPLIFIED PROBE: CPT | Mod: 59 | Performed by: FAMILY MEDICINE

## 2025-01-07 PROCEDURE — 99213 OFFICE O/P EST LOW 20 MIN: CPT | Mod: 25 | Performed by: FAMILY MEDICINE

## 2025-01-07 PROCEDURE — 87507 IADNA-DNA/RNA PROBE TQ 12-25: CPT | Performed by: FAMILY MEDICINE

## 2025-01-08 LAB — TESTOST SERPL-MCNC: 233 NG/DL (ref 240–950)

## 2025-01-23 ENCOUNTER — TRANSFERRED RECORDS (OUTPATIENT)
Dept: HEALTH INFORMATION MANAGEMENT | Facility: CLINIC | Age: 65
End: 2025-01-23
Payer: COMMERCIAL

## 2025-01-28 DIAGNOSIS — I77.810 MILD ASCENDING AORTA DILATATION: Primary | ICD-10-CM

## 2025-01-28 DIAGNOSIS — I10 ESSENTIAL HYPERTENSION: ICD-10-CM

## 2025-01-29 DIAGNOSIS — E11.42 TYPE 2 DIABETES MELLITUS WITH PERIPHERAL NEUROPATHY (H): ICD-10-CM

## 2025-01-29 RX ORDER — METFORMIN HYDROCHLORIDE 500 MG/1
1000 TABLET, EXTENDED RELEASE ORAL 2 TIMES DAILY
Qty: 360 TABLET | Refills: 0 | Status: SHIPPED | OUTPATIENT
Start: 2025-01-29

## 2025-01-29 NOTE — TELEPHONE ENCOUNTER
Requested Prescriptions   Pending Prescriptions Disp Refills    metFORMIN (GLUCOPHAGE XR) 500 MG 24 hr tablet [Pharmacy Med Name: METFORMIN ER 500MG 24HR TABS] 360 tablet 0     Sig: TAKE 2 TABLETS(1000 MG) BY MOUTH TWICE DAILY       Biguanide Agents Passed - 1/29/2025 12:16 PM        Passed - Patient is age 10 or older        Passed - Patient has documented A1c within the specified period of time.     If HgbA1C is 8 or greater, it needs to be on file within the past 3 months.  If less than 8, must be on file within the past 6 months.     Recent Labs   Lab Test 01/06/25  0735   A1C 7.0*             Passed - Patient does NOT have a diagnosis of CHF.        Passed - Medication is active on med list        Passed - Medication indicated for associated diagnosis     Medication is associated with one or more of the following diagnoses:     Gestational diabetes mellitus     Hyperinsulinar obesity     Hypersecretion of ovarian androgens    Non-alcoholic fatty liver    Polycystic ovarian syndrome               Pre-diabetes (DM 2 prevention)    Type 2 diabetes mellitus     Weight gain, antipsychotic therapy-induced    Impaired fasting glucose          Passed - Has GFR on file in past 12 months and most recent value is normal        Passed - Recent (6 mo) or future (90 days) visit within the authorizing provider's specialty     The patient must have completed an in-person or virtual visit within the past 6 months or has a future visit scheduled within the next 90 days with the authorizing provider s specialty.  Urgent care and e-visits do not quality as an office visit for this protocol.

## 2025-02-12 DIAGNOSIS — I77.810 ASCENDING AORTA DILATATION: ICD-10-CM

## 2025-02-12 DIAGNOSIS — I10 BENIGN ESSENTIAL HYPERTENSION: Primary | ICD-10-CM

## 2025-02-12 DIAGNOSIS — I77.810 AORTIC ROOT DILATATION: ICD-10-CM

## 2025-02-14 ENCOUNTER — TRANSFERRED RECORDS (OUTPATIENT)
Dept: MULTI SPECIALTY CLINIC | Facility: CLINIC | Age: 65
End: 2025-02-14

## 2025-02-14 ENCOUNTER — LAB (OUTPATIENT)
Dept: LAB | Facility: CLINIC | Age: 65
End: 2025-02-14
Payer: COMMERCIAL

## 2025-02-14 DIAGNOSIS — E11.42 TYPE 2 DIABETES MELLITUS WITH PERIPHERAL NEUROPATHY (H): ICD-10-CM

## 2025-02-14 DIAGNOSIS — R79.89 LOW SERUM TESTOSTERONE: ICD-10-CM

## 2025-02-14 LAB
EST. AVERAGE GLUCOSE BLD GHB EST-MCNC: 163 MG/DL
HBA1C MFR BLD: 7.3 % (ref 0–5.6)
RETINOPATHY: NORMAL

## 2025-02-14 PROCEDURE — 84403 ASSAY OF TOTAL TESTOSTERONE: CPT

## 2025-02-14 PROCEDURE — 80048 BASIC METABOLIC PNL TOTAL CA: CPT

## 2025-02-14 PROCEDURE — 36415 COLL VENOUS BLD VENIPUNCTURE: CPT

## 2025-02-14 PROCEDURE — 83721 ASSAY OF BLOOD LIPOPROTEIN: CPT

## 2025-02-14 PROCEDURE — 82570 ASSAY OF URINE CREATININE: CPT

## 2025-02-14 PROCEDURE — 83036 HEMOGLOBIN GLYCOSYLATED A1C: CPT

## 2025-02-14 PROCEDURE — 82043 UR ALBUMIN QUANTITATIVE: CPT

## 2025-02-15 LAB
ANION GAP SERPL CALCULATED.3IONS-SCNC: 13 MMOL/L (ref 7–15)
BUN SERPL-MCNC: 18 MG/DL (ref 8–23)
CALCIUM SERPL-MCNC: 9.6 MG/DL (ref 8.8–10.4)
CHLORIDE SERPL-SCNC: 104 MMOL/L (ref 98–107)
CREAT SERPL-MCNC: 0.96 MG/DL (ref 0.67–1.17)
CREAT UR-MCNC: 194 MG/DL
EGFRCR SERPLBLD CKD-EPI 2021: 89 ML/MIN/1.73M2
GLUCOSE SERPL-MCNC: 130 MG/DL (ref 70–99)
HCO3 SERPL-SCNC: 23 MMOL/L (ref 22–29)
LDLC SERPL DIRECT ASSAY-MCNC: 84 MG/DL
MICROALBUMIN UR-MCNC: 39.8 MG/L
MICROALBUMIN/CREAT UR: 20.52 MG/G CR (ref 0–17)
POTASSIUM SERPL-SCNC: 4.1 MMOL/L (ref 3.4–5.3)
SODIUM SERPL-SCNC: 140 MMOL/L (ref 135–145)

## 2025-02-16 LAB — TESTOST SERPL-MCNC: 217 NG/DL (ref 240–950)

## 2025-02-27 NOTE — DISCHARGE INSTRUCTIONS
Crispin Rojas      1960    A DME order was not completed because the patient declined the need for supplies      Wound Dressing Change: left plantar foot and left dorsal foot  - Wash your hands with soap and water before you begin your dressing change and prepare a clean surface for dressings.  - Cleanse with mild unscented soap and water (such as Cetaphil, Cerave or Dove)   - Apply thick, high-quality moisturizer to intact skin (such as CeraVe, Eucerin, Aquaphor or Vanicream)   - Apply betadine to wounds  - Cover with dry gauze roll  - Secure with tape  - Apply Spandigrip size E from toes to ankle and size F from ankle to knee  Leave in place; Change outer gauze roll and tape if becomes soiled or loose     Compression:   Spandigrip E and can be removed at night and put back on first thing in the morning.   Please remove compression dressing if toes turn blue and/or tingle and can not be relieved by raising the leg for one hour. If unable to reapply in the morning, keep compression on until next dressing change.     Limit walking/weight bearing as much and possible   Use AirCast boot with knee roller. Continue shoes with diabetic inserts until you get your new ones from [employer].  Whenever you sit raise your ankle above your hips to promote wound healing.      Blood Sugars:  Keep blood sugars below 150 and A1C below 7      Tiny Soto, D.P.M. October 30, 2023    Call us at [phone] if you have any questions about your wounds, have redness or swelling around your wound, have a fever of 101 degrees Fahrenheit or greater or if you have any other problems or concerns. We answer the phone Monday through Friday 8 am to 4 pm, please leave a message as we check the voicemail frequently throughout the day.     If you had a positive experience please indicate that on your patient satisfaction survey form that  [employer] will be sending you.  It was a pleasure meeting with you today.  Thank you for  Date of Service:  02/25/2025     Ms. Hinds comes in today for followup after lumbar epidural injection.  She had a few days of good relief and subsequently had to lift her father.  Pain has recurred and she currently states her pain is about 20% better than before injection.  She is complaining of back pain that goes into the left lower extremity.  She is taking tramadol for pain control.    MRI of the lumbar spine was again reviewed with her.  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    Both operative and nonoperative treatment options discussed with her.  At this point, she is interested in proceeding with surgery.  I have discussed left L4-5 laminectomy.  Risks, benefits, alternatives were discussed.  Risks include, but not limited to, bleeding, infection, neurovascular injury, nerve root injuries resulting in paralysis, loss of bowel or bladder control, dural tears, inadequate relief of symptoms, segmental instability, further surgeries, risks of anesthesia, medical risks including DVT, PE, and death.  She understands risks, wishes to proceed.  I have asked her to obtain medical clearance from [facility]he[facility].  Surgery will be scheduled.        Dictated By:  Jose Antonio Jeong MD  Signing Provider:  Jose Antonio Jeong MD    PS/AQT  D:  02/27/2025 08:17:06 AM  T:  02/27/2025 08:30:33 AM  Job:  [id number]  CC:  ANNAMARIA Clark     allowing me and my team the privilege of caring for you today.  YOU are the reason we are here, and I truly hope we provided you with the excellent service you deserve.  Please let us know if there is anything else we can do for you so that we can be sure you are leaving completely satisfied with your care experience.      If you have any billing related questions please call the [facility] Business office at [phone]. The clinic staff does not handle billing related matters.  If you are scheduled to have a follow up appointment, you will receive a reminder call the day before your visit. On the appointment day please arrive 15 minutes prior to your appointment time. If you are unable to keep that appointment, please call the clinic to cancel or reschedule. If you are more than 10 minutes late or greater for your scheduled appointment time, the clinic policy is that you may be asked to reschedule.

## 2025-02-28 PROBLEM — E13.621: Status: RESOLVED | Noted: 2024-06-17 | Resolved: 2025-02-28

## 2025-02-28 PROBLEM — L97.426: Status: RESOLVED | Noted: 2024-06-17 | Resolved: 2025-02-28

## 2025-02-28 PROBLEM — E11.621: Status: RESOLVED | Noted: 2023-10-30 | Resolved: 2025-02-28

## 2025-02-28 PROBLEM — E11.622 DIABETIC ULCER OF LOWER EXTREMITY (H): Status: RESOLVED | Noted: 2019-11-07 | Resolved: 2025-02-28

## 2025-02-28 PROBLEM — L97.909 DIABETIC ULCER OF LOWER EXTREMITY (H): Status: RESOLVED | Noted: 2019-11-07 | Resolved: 2025-02-28

## 2025-02-28 PROBLEM — L97.522: Status: RESOLVED | Noted: 2023-10-30 | Resolved: 2025-02-28

## 2025-03-05 ENCOUNTER — ANCILLARY PROCEDURE (OUTPATIENT)
Dept: GENERAL RADIOLOGY | Facility: CLINIC | Age: 65
End: 2025-03-05
Attending: FAMILY MEDICINE
Payer: COMMERCIAL

## 2025-03-05 ENCOUNTER — OFFICE VISIT (OUTPATIENT)
Dept: FAMILY MEDICINE | Facility: CLINIC | Age: 65
End: 2025-03-05
Payer: COMMERCIAL

## 2025-03-05 VITALS
HEIGHT: 71 IN | OXYGEN SATURATION: 95 % | HEART RATE: 75 BPM | DIASTOLIC BLOOD PRESSURE: 70 MMHG | BODY MASS INDEX: 33.6 KG/M2 | TEMPERATURE: 98.2 F | RESPIRATION RATE: 16 BRPM | SYSTOLIC BLOOD PRESSURE: 115 MMHG | WEIGHT: 240 LBS

## 2025-03-05 DIAGNOSIS — R79.89 LOW TESTOSTERONE IN MALE: Primary | ICD-10-CM

## 2025-03-05 DIAGNOSIS — M25.552 HIP PAIN, LEFT: ICD-10-CM

## 2025-03-05 DIAGNOSIS — M25.572 PAIN IN JOINT INVOLVING ANKLE AND FOOT, LEFT: ICD-10-CM

## 2025-03-05 PROCEDURE — 99214 OFFICE O/P EST MOD 30 MIN: CPT | Performed by: FAMILY MEDICINE

## 2025-03-05 PROCEDURE — 83002 ASSAY OF GONADOTROPIN (LH): CPT | Performed by: FAMILY MEDICINE

## 2025-03-05 PROCEDURE — 3078F DIAST BP <80 MM HG: CPT | Performed by: FAMILY MEDICINE

## 2025-03-05 PROCEDURE — 73610 X-RAY EXAM OF ANKLE: CPT | Mod: TC | Performed by: RADIOLOGY

## 2025-03-05 PROCEDURE — 83001 ASSAY OF GONADOTROPIN (FSH): CPT | Performed by: FAMILY MEDICINE

## 2025-03-05 PROCEDURE — 3074F SYST BP LT 130 MM HG: CPT | Performed by: FAMILY MEDICINE

## 2025-03-05 PROCEDURE — 73502 X-RAY EXAM HIP UNI 2-3 VIEWS: CPT | Mod: TC | Performed by: RADIOLOGY

## 2025-03-05 PROCEDURE — 36415 COLL VENOUS BLD VENIPUNCTURE: CPT | Performed by: FAMILY MEDICINE

## 2025-03-05 PROCEDURE — G2211 COMPLEX E/M VISIT ADD ON: HCPCS | Performed by: FAMILY MEDICINE

## 2025-03-05 RX ORDER — ERGOCALCIFEROL 1.25 MG/1
50000 CAPSULE ORAL
COMMUNITY

## 2025-03-05 RX ORDER — TESTOSTERONE CYPIONATE 1000 MG/10ML
50 INJECTION, SOLUTION INTRAMUSCULAR
Qty: 10 ML | Refills: 1 | Status: SHIPPED | OUTPATIENT
Start: 2025-03-05

## 2025-03-05 NOTE — PROGRESS NOTES
"  {PROVIDER CHARTING PREFERENCE:397672}    Subjective   Pat is a 64 year old, presenting for the following health issues:  Consult (Testosterone ) and Musculoskeletal Problem      History of Present Illness       Reason for visit:  Iron and testosterone deficiency  Symptom onset:  More than a month  Symptoms include:  Low counts  Symptom intensity:  Moderate  Symptom progression:  Staying the same  Had these symptoms before:  Yes  Has tried/received treatment for these symptoms:  Yes  Previous treatment was successful:  No  What makes it worse:  No  What makes it better:  No   He is taking medications regularly.        {MA/LPN/RN Pre-Provider Visit Orders- hCG/UA/Strep (Optional):817315}      Testosterone consult. Levels and treatment questions.    Left hip and left ankle pain for off and on for couple months and getting worse making it hard to sleep. Only really hurts at night, ice helps a lot           {additonal problems for provider to add (Optional):542814}    {ROS Picklists (Optional):295524}    Patient started to have left hip pain since last Summer.     Started to have pain over top of left foot and anterior left ankle for past month. No know injury, but has had toe amputation.     Diarrhea improved about 3 weeks ago after having for 4 months, which was daily diarrhea.     Patient had diabetic eye exam done at [facility] in [place], [place] on 02/14/2025.         Objective    /70 (BP Location: Right arm, Patient Position: Chair, Cuff Size: Adult Large)   Pulse 75   Temp 98.2  F (36.8  C) (Oral)   Resp 16   Ht 1.81 m (5' 11.25\")   Wt 108.9 kg (240 lb)   SpO2 95%   BMI 33.24 kg/m    Body mass index is 33.24 kg/m .  Physical Exam   {Exam List (Optional):316982}    Tender over left mid/distal ankle over flexor tendons.     Left hip exam: is tender over left greater trochanteric area. Has pain in this area with left hip adduction.     {Diagnostic Test Results (Optional):916413}        Signed " Electronically by: Johann Serna DO  {Email feedback regarding this note to [email or web address]   :695140}   3/5/2025    INDICATION:  Hip pain, left  COMPARISON: None.      Impression    IMPRESSION: Normal alignment. No fracture. Mild degenerative arthritis left hip joint. Degenerative changes lumbar spine. Arterial calcification.   Results for orders placed or performed in visit on 03/05/25   XR Ankle Left G/E 3 Views     Status: None    Narrative    EXAM: XR ANKLE LEFT G/E 3 VIEWS  LOCATION: [facility]  DATE: 3/5/2025    INDICATION:  Pain in joint involving ankle and foot, left  COMPARISON: None.      Impression    IMPRESSION: Normal joint spaces and alignment. No fracture. Achilles and plantar calcaneal spurs.    Transmetatarsal amputations, incompletely imaged. Arterial calcification.   Results for orders placed or performed in visit on 03/05/25   Follicle stimulating hormone     Status: Abnormal   Result Value Ref Range    FSH 21.9 (H) 1.5 - 12.4 mIU/mL   Luteinizing Hormone     Status: Abnormal   Result Value Ref Range    Luteinizing Hormone 12.6 (H) 1.7 - 8.6 mIU/mL           Signed Electronically by: Johann Serna DO

## 2025-03-06 LAB
FSH SERPL IRP2-ACNC: 21.9 MIU/ML (ref 1.5–12.4)
LH SERPL-ACNC: 12.6 MIU/ML (ref 1.7–8.6)

## 2025-03-08 DIAGNOSIS — E11.42 TYPE 2 DIABETES MELLITUS WITH PERIPHERAL NEUROPATHY (H): Primary | ICD-10-CM

## 2025-03-09 ENCOUNTER — MYC MEDICAL ADVICE (OUTPATIENT)
Dept: FAMILY MEDICINE | Facility: CLINIC | Age: 65
End: 2025-03-09
Payer: COMMERCIAL

## 2025-03-09 DIAGNOSIS — R79.89 LOW TESTOSTERONE IN MALE: Primary | ICD-10-CM

## 2025-03-10 RX ORDER — SYRINGE WITH NEEDLE, 1 ML 25GX5/8"
SYRINGE, EMPTY DISPOSABLE MISCELLANEOUS
Qty: 100 EACH | Refills: 0 | Status: SHIPPED | OUTPATIENT
Start: 2025-03-10

## 2025-03-10 RX ORDER — SEMAGLUTIDE 2.68 MG/ML
INJECTION, SOLUTION SUBCUTANEOUS
Qty: 9 ML | Refills: 0 | Status: SHIPPED | OUTPATIENT
Start: 2025-03-10

## 2025-03-10 RX ORDER — NEEDLES, DISPOSABLE 25GX5/8"
1 NEEDLE, DISPOSABLE MISCELLANEOUS
Qty: 100 EACH | Refills: 0 | Status: SHIPPED | OUTPATIENT
Start: 2025-03-10

## 2025-03-10 NOTE — TELEPHONE ENCOUNTER
Requested Prescriptions   Pending Prescriptions Disp Refills    OZEMPIC (2 MG/DOSE) 8 MG/3ML pen [Pharmacy Med Name: OZEMPIC 2MG PER DOSE (8MG/3ML) PFP] 9 mL      Sig: INJECT 2 MG UNDER THE SKIN EVERY 7 DAYS       GLP-1 Agonists Protocol Failed - 3/10/2025 11:48 AM        Failed - Medication is active on med list and the sig matches. RN to manually verify dose and sig if red X/fail.     If the protocol passes (green check), you do not need to verify med dose and sig.    A prescription matches if they are the same clinical intention.    For Example: once daily and every morning are the same.    The protocol can not identify upper and lower case letters as matching and will fail.     For Example: Take 1 tablet (50 mg) by mouth daily     TAKE 1 TABLET (50 MG) BY MOUTH DAILY    For all fails (red x), verify dose and sig.    If the refill does match what is on file, the RN can still proceed to approve the refill request.       If they do not match, route to the appropriate provider.             Failed - Medication indicated for associated diagnosis     Medication is associated with one or more of the following diagnoses:     Type 2 diabetes mellitus           Passed - Has GFR on file in past 12 months and most recent value is normal        Passed - Recent (6 mo) or future (90 days) visit within the authorizing provider's specialty     The patient must have completed an in-person or virtual visit within the past 6 months or has a future visit scheduled within the next 90 days with the authorizing provider s specialty.  Urgent care and e-visits do not quality as an office visit for this protocol.          Passed - Patient is age 18 or older

## 2025-03-13 ENCOUNTER — TELEPHONE (OUTPATIENT)
Dept: FAMILY MEDICINE | Facility: CLINIC | Age: 65
End: 2025-03-13

## 2025-03-13 NOTE — TELEPHONE ENCOUNTER
Summary:    Patient is due/failing the following:   Eye exam    Reviewed:    [] CARE EVERYWHERE  [] LAST OV NOTE   [] FYI TAB  [] MYCHART ACTIVE?  [] LAST PANEL ENCOUNTER  [] FUTURE APPTS  [] IMMUNIZATIONS  [] Media Tab            Action needed:   Patient needs referral/order: eye exam    Type of outreach:    Received eye report from clinic and is positive for retinopathy, report sent to abstraction                                                                               Shania Warren/[facility]

## 2025-03-14 ENCOUNTER — TELEPHONE (OUTPATIENT)
Dept: CARDIOLOGY | Facility: CLINIC | Age: 65
End: 2025-03-14

## 2025-03-14 ENCOUNTER — HOSPITAL ENCOUNTER (OUTPATIENT)
Dept: CARDIOLOGY | Facility: CLINIC | Age: 65
Discharge: HOME | End: 2025-03-14
Payer: COMMERCIAL

## 2025-03-14 DIAGNOSIS — I77.810 AORTIC ROOT DILATATION: ICD-10-CM

## 2025-03-14 DIAGNOSIS — I10 BENIGN ESSENTIAL HYPERTENSION: ICD-10-CM

## 2025-03-14 DIAGNOSIS — I77.810 ASCENDING AORTA DILATATION: ICD-10-CM

## 2025-03-14 LAB — LVEF ECHO: NORMAL

## 2025-03-14 PROCEDURE — 255N000002 HC RX 255 OP 636: Performed by: INTERNAL MEDICINE

## 2025-03-14 PROCEDURE — 93306 TTE W/DOPPLER COMPLETE: CPT | Mod: 26 | Performed by: INTERNAL MEDICINE

## 2025-03-14 PROCEDURE — 999N000208 ECHOCARDIOGRAM COMPLETE

## 2025-03-14 PROCEDURE — C8929 TTE W OR WO FOL WCON,DOPPLER: HCPCS

## 2025-03-14 RX ADMIN — HUMAN ALBUMIN MICROSPHERES AND PERFLUTREN 9 ML: 10; .22 INJECTION, SOLUTION INTRAVENOUS at 08:19

## 2025-03-14 NOTE — TELEPHONE ENCOUNTER
Patient is not scheduled for follow up with Dr. Theodore until 5/29/25. Below echocardiogram results routed to Dr. Theodore for review per protocol.      Interpretation Summary     Aortic root dilatation is present, 4.0 cm  Ascending aorta dilatation is present, 4.3 cm  Contrast was used without apparent complications. Compared to prior study,  there is no significant change.

## 2025-03-16 DIAGNOSIS — E11.42 TYPE 2 DIABETES MELLITUS WITH PERIPHERAL NEUROPATHY (H): ICD-10-CM

## 2025-03-17 RX ORDER — INSULIN DEGLUDEC 100 U/ML
INJECTION, SOLUTION SUBCUTANEOUS
Qty: 45 ML | Refills: 0 | Status: SHIPPED | OUTPATIENT
Start: 2025-03-17

## 2025-03-17 NOTE — TELEPHONE ENCOUNTER
[employer] message sent to patient-      Singh Theodore MD Ashenmacher, Megan, RN; [facility] Team 22 days ago     Noted. No concerns. Thanks.    SJ

## 2025-03-17 NOTE — TELEPHONE ENCOUNTER
Requested Prescriptions   Pending Prescriptions Disp Refills    TRESIBA FLEXTOUCH 100 UNIT/ML pen [Pharmacy Med Name: TRESIBA FLEXTOUCH PEN (U-100)INJ3ML] 45 mL 1     Sig: ADMINISTER 50 TO 55 UNITS UNDER THE SKIN DAILY       Insulin Protocol Failed - 3/17/2025  8:58 AM        Failed - Chart review required     Review Chart.    Do not approve if insulin is used in a pump.  Instead, direct refill request to the patient's endocrinologist.  If the patient doesn't have an endocrinologist, then send the refill to the patient's PCP for review            Passed - HgbA1C in past 3 or 6 months     If HgbA1C is 8 or greater, it needs to be on file within the past 3 months.  If less than 8, must be on file within the past 6 months.     Recent Labs   Lab Test 02/14/25  0857   A1C 7.3*             Passed - Medication is active on med list and the sig matches. RN to manually verify dose and sig if red X/fail.     If the protocol passes (green check), you do not need to verify med dose and sig.    A prescription matches if they are the same clinical intention.    For Example: once daily and every morning are the same.    The protocol can not identify upper and lower case letters as matching and will fail.     For Example: Take 1 tablet (50 mg) by mouth daily     TAKE 1 TABLET (50 MG) BY MOUTH DAILY    For all fails (red x), verify dose and sig.    If the refill does match what is on file, the RN can still proceed to approve the refill request.       If they do not match, route to the appropriate provider.             Passed - Recent (6 mo) or future (90 days) visit within the authorizing provider's specialty     The patient must have completed an in-person or virtual visit within the past 6 months or has a future visit scheduled within the next 90 days with the authorizing provider s specialty.  Urgent care and e-visits do not quality as an office visit for this protocol.          Passed - Medication indicated for associated  diagnosis     Medication is associated with one or more of the following diagnoses:   - Type 1 diabetes mellitus  - Type 2 diabetes mellitus  - Diabetic nephropathy; Prophylaxis  - Neuropathy due to diabetes mellitus; Prophylaxis  - Retinopathy due to diabetes mellitus; Prophylaxis  - Diabetes mellitus associated with cystic fibrosis  - Disorder of cardiovascular system; Prophylaxis - Type 1 diabetes mellitus   - Disorder of cardiovascular system; Prophylaxis - Type 2 diabetes mellitus            Passed - Patient is 18 years of age or older

## 2025-03-26 ENCOUNTER — OFFICE VISIT (OUTPATIENT)
Dept: PODIATRY | Facility: CLINIC | Age: 65
End: 2025-03-26
Payer: COMMERCIAL

## 2025-03-26 DIAGNOSIS — L84 CALLUS OF FOOT: ICD-10-CM

## 2025-03-26 DIAGNOSIS — E11.42 DIABETIC POLYNEUROPATHY ASSOCIATED WITH TYPE 2 DIABETES MELLITUS (H): Primary | ICD-10-CM

## 2025-03-26 DIAGNOSIS — Z89.432 S/P TRANSMETATARSAL AMPUTATION OF FOOT, LEFT (H): ICD-10-CM

## 2025-03-26 PROCEDURE — 99213 OFFICE O/P EST LOW 20 MIN: CPT | Performed by: PODIATRIST

## 2025-03-26 NOTE — PATIENT INSTRUCTIONS
Thank you for choosing [facility]!    DR. SAGE'S LOCATIONS  [facility] APPOINTMENTS: [phone]   [address] TRIAGE NURSE: [phone]   [place] [address] RADIOLOGY: [phone]   FAX: [phone] BILLING QUESTIONS: [phone]    CONSUMER PRICE LINE:[phone]   [facility]    [facility] [id number]    [place] [address]    PH: [phone]    FAX: [phone]      Follow up:   -Continue to closely evaluate feet daily.   -Monitor calluses, specifically on the left foot. Use pumice stone and lotion to keep small and manageable.     -Follow up with [employer] regarding exception form. The order was placed for orthotics. Bring form to the office and Dr. Sage can fill it out and fax it.     Follow up as needed

## 2025-03-26 NOTE — LETTER
3/26/2025      Crispin Rojas  [address] [facility] [address]      Dear Colleague,    Thank you for referring your patient, Crispin Rojas, to the [facility]. Please see a copy of my visit note below.     [facility]  CLINIC NOTE    CHIEF COMPLAINT:  left foot    PATIENT HISTORY:  Crispin Rojas is a 64 year old male  who presents for follow up for his left foot. Had a TMA on 6/18/2024. Site healed uneventfully. He was also seen in the hospital when he was admitted for a hand infection. At that time, there was no open lesions to his left foot, but there was a small callus to the plantar first metatarsal. He was advised of this and to slowly increase activity. Since then he hiked three miles and states the submet one site started draining. States now the site has been dry. Is now back to work. Denies pain to the site. Denies N/F/V/C/D.   9/11: Follows up for his left foot. States no new issues to the foot and has been doing well. States the ulcer on the plantar site is totally healed. Was recently admitted for cholecystitis and had his gallbladder removed. Having ongoing abdominal pain which he states is improving. Currently, not wearing dressings on his feet. Is getting closer to full activity. Has CMO in tennis shoe.   10/29: Follows up for bilateral feet. No pain or issues. Is up to full activity now, golfing and working. No draining sites. Does note that his left foot cracks from time to time. Otherwise denies N/F/V/C/D.     12/18: Follows up for bilateral feet. No new issues or concerns. Has been up and is at full activity. States no sites are draining and feels well. Denies N/F/V/C/D.     3/26: Follows up for bilateral feet. States has been doing well. Has been active and back to work. Does state that he had an issue with significant left ankle pain a few weeks ago. States came on suddenly and extended from dorsal foot to anterior leg.  States resolved on his own and follow up with his PCP, no issues on xray. Primary request today is a new CMO prescription        Review of Systems:  A 10 point review of systems was performed and is positive for that noted above in the patient history.  All other areas are negative.     PAST MEDICAL HISTORY:   Past Medical History:   Diagnosis Date     Antiplatelet or antithrombotic long-term use      Ascending aorta dilatation      Cerebral artery occlusion with cerebral infarction (H)      Cerebral infarction (H)     2010     Gastroesophageal reflux disease with esophagitis      H/O blood clots 2022     Hyperlipidemia LDL goal <70      Hypertension      Nonalcoholic steatohepatitis 11/30/2022     Numbness and tingling      Obesity      GILA (obstructive sleep apnea) 10/22/2018    CPAP intolerant     PVD (peripheral vascular disease)     s/p left partial toe amputation     Renal stone      Tremor      Type 2 diabetes mellitus with diabetic polyneuropathy, with long-term current use of insulin (H)         PAST SURGICAL HISTORY:   Past Surgical History:   Procedure Laterality Date     AMPUTATE FOOT Left 8/18/2016    Procedure: AMPUTATE FOOT;  Surgeon: Jack Younger DPM;  Location: [facility]     AMPUTATE TOE(S) Right 2/1/2016    Procedure: AMPUTATE TOE(S);  Surgeon: Rachelle Manriquez DPM, Pod;  Location: [facility]     AMPUTATE TOE(S) Right 8/2/2019    Procedure: Right fourth toe partial amputation for treatment of osteomyelitis.;  Surgeon: Jack Younger DPM;  [facility]: [facility]     AMPUTATE TOE(S) Left 11/8/2019    Procedure: Left second toe amputation at the metatarsophalangeal joint.;  Surgeon: Rachelle Manriquez DPM, Podiatry/Foot and Ankle Surgery;  Location: [facility]     AMPUTATE TOE(S) Right 6/5/2022    Procedure: AMPUTATION OF RIGHT GREAT TOE;  Surgeon: Wili Quiles DPM;  [facility]: [facility]     AMPUTATE TOE(S) Right 7/28/2022    Procedure: Right foot partial first metatarsal resection;  Surgeon: Tiny Soto  SHRUTHI JAUREGUI;  Location: [facility]     AMPUTATE TOE(S) Left 6/18/2024    Procedure: Left foot transmetatarsal amputation;  Surgeon: Tiny Soto DPM;  Location: [facility]     ANGIOGRAM       BIOPSY BONE FOOT Right 7/24/2022    Procedure: Bone biopsy, right first metatarsal.;  Surgeon: Valentino Molina DPM;  Location: [facility]     COLONOSCOPY       COMBINED CYSTOSCOPY, RETROGRADES, URETEROSCOPY, INSERT STENT Left 8/21/2016    Procedure: COMBINED CYSTOSCOPY, RETROGRADES, URETEROSCOPY, INSERT STENT;  Surgeon: Artemio Valenzuela MD;  Location: [facility]     COMBINED CYSTOSCOPY, RETROGRADES, URETEROSCOPY, LASER HOLMIUM LITHOTRIPSY URETER(S), INSERT STENT Left 10/3/2016    Procedure: COMBINED CYSTOSCOPY, RETROGRADES, URETEROSCOPY, LASER HOLMIUM LITHOTRIPSY URETER(S), INSERT STENT;  Surgeon: Artemio Valenzuela MD;  [facility]     EXTRACORPOREAL SHOCK WAVE LITHOTRIPSY (ESWL) Left 9/8/2016    Procedure: EXTRACORPOREAL SHOCK WAVE LITHOTRIPSY (ESWL);  Surgeon: Artemio Valenzuela MD;  Location: [facility]     INCISION AND DRAINAGE FOOT, COMBINED Right 7/24/2022    Procedure: Incision and drainage, right foot ;  Surgeon: Valentino Molina DPM;  [facility]: [facility]     IRRIGATION AND DEBRIDEMENT FOOT, COMBINED Left 10/19/2023    Procedure: Left foot second metatarsal resection , . Left plantar ulcer excisional debridement, down to and including tendon, with closure, site measuring 4 cm x 2 cm x 1 cm;  Surgeon: Tiny Soto DPM;  [facility]: [facility]     IRRIGATION AND DEBRIDEMENT HAND, COMBINED Right 7/30/2024    Procedure: Irrigation and Debridement of Right Dorsal Hand and wrist;  Surgeon: June Mcdaniels MD;  [facility]: [facility]     LAPAROSCOPIC CHOLECYSTECTOMY N/A 9/6/2024    Procedure: Laparoscopic cholecystectomy;  Surgeon: Aakash Meraz MD;  [facility]: [facility]     OSTEOTOMY FOOT Right 11/30/2022    Procedure: 1. Right second metatarsal floating osteotomy  2. Right second digit proximal phalanx  arthroplasty 3. Right percutaneous flexor tenotomy;  Surgeon: Tiny Soto DPM;  Location: [facility]     OSTEOTOMY FOOT Right 1/19/2023    Procedure: Right foot third metatarsal head excision, ulcer debridement, matatarsal osteotomies;  Surgeon: Tiny Soto DPM;  Location: [facility]     RECESSION GASTROCNEMIUS Right 2/1/2016    Procedure: RECESSION GASTROCNEMIUS;  Surgeon: Rachelle Manriquez DPM, Pod;  [facility]: [facility]        MEDICATIONS:  Reviewed in Epic.     ALLERGIES:    Allergies   Allergen Reactions     Bactrim [Sulfamethoxazole-Trimethoprim] Nausea and Vomiting     Statins Swelling     Other reaction(s): Other (see comments)  SIMCOR caused edema in extremities  Only Simvastatin     Sulfatolamide Nausea and Vomiting     Niacin Swelling     SIMCOR caused edema in extremities     Simvastatin Swelling     SIMCOR caused edema in extremities     Sulfa Antibiotics Nausea        SOCIAL HISTORY:   Social History     Socioeconomic History     Marital status:      Spouse name: Sydney     Number of children: 2     Years of education: Not on file     Highest education level: Master's degree (e.g., MA, MS, [place], MEd, MSW, ELIANA)   Occupational History     Occupation: marketing     Employer: "[employer]"     Comment: [employer]   Tobacco Use     Smoking status: Never     Passive exposure: Never     Smokeless tobacco: Never   Vaping Use     Vaping status: Never Used   Substance and Sexual Activity     Alcohol use: Yes     Comment: rare---red wine 3x per month     Drug use: Never     Sexual activity: Not Currently     Partners: Female   Other Topics Concern     Parent/sibling w/ CABG, MI or angioplasty before 65F 55M? No   Social History Narrative     Not on file     Social Drivers of Health     Financial Resource Strain: Low Risk  (1/6/2025)    Financial Resource Strain      Within the past 12 months, have you or your family members you live with been unable to get utilities (heat, electricity) when it was really  needed?: No   Food Insecurity: Low Risk  (1/6/2025)    Food Insecurity      Within the past 12 months, did you worry that your food would run out before you got money to buy more?: No      Within the past 12 months, did the food you bought just not last and you didn t have money to get more?: No   Transportation Needs: Low Risk  (1/6/2025)    Transportation Needs      Within the past 12 months, has lack of transportation kept you from medical appointments, getting your medicines, non-medical meetings or appointments, work, or from getting things that you need?: No   Physical Activity: Sufficiently Active (1/6/2025)    Exercise Vital Sign      Days of Exercise per Week: 6 days      Minutes of Exercise per Session: 40 min   Stress: No Stress Concern Present (1/6/2025)    [place] [employer] - Occupational Stress Questionnaire      Feeling of Stress : Not at all   Social Connections: Unknown (1/6/2025)    Social Connection and Isolation Panel [NHANES]      Frequency of Communication with Friends and Family: Not on file      Frequency of Social Gatherings with Friends and Family: Once a week      Attends [employer] Services: Not on file      Active Member of Clubs or Organizations: Not on file      Attends Club or Organization Meetings: Not on file      Marital Status: Not on file   Interpersonal Safety: Low Risk  (1/6/2025)    Interpersonal Safety      Do you feel physically and emotionally safe where you currently live?: Yes      Within the past 12 months, have you been hit, slapped, kicked or otherwise physically hurt by someone?: No      Within the past 12 months, have you been humiliated or emotionally abused in other ways by your partner or ex-partner?: No   Housing Stability: Low Risk  (1/6/2025)    Housing Stability      Do you have housing? : Yes      Are you worried about losing your housing?: No        FAMILY HISTORY:   Family History   Problem Relation Age of Onset     Arthritis Mother          SLE     Connective Tissue Disorder Mother         lupus     Diabetes Mother      Cerebrovascular Disease Mother      Cancer Mother      Diabetes Father      Coronary Artery Disease Father      Chronic Obstructive Pulmonary Disease Father      Diabetes Sister      Diabetes Maternal Grandfather         EXAM:Vitals: There were no vitals taken for this visit.  BMI= There is no height or weight on file to calculate BMI.      General appearance: Patient is alert and fully cooperative with history & exam.  No sign of distress is noted during the visit.      Respiratory: Breathing is regular & unlabored while sitting.      HEENT: Hearing is intact to spoken word.  Speech is clear.  No gross evidence of visual impairment that would impact ambulation.      Dermatologic: Left foot TMA site: well closed and healed. No open lesions.  --> small submet one callus  Right foot also evaluated: no open lesions, preulcerative lesions or signs of infection.      Vascular: Dorsalis pedis and posterior tibial pulses are intact & regular bilaterally.  CFT and skin temperature is normal to both lower extremities.      Neurologic: Lower extremity sensation is diminished, bilateral foot, to light touch.  No evidence of neurological-based weakness or contracture in the lower extremities.       Musculoskeletal: Patient is ambulatory without an assistive device or brace.  S/p TMA on left foot.     Psychiatric: Affect is pleasant & appropriate.      Final Diagnosis   A.  Left forefoot, amputation:  -Surgical absence of the great and second toe  -Distal volar surface with deep ulcer with adjacent active, purulent osteomyelitis  -Tissues designated as margins without evidence of osteomyelitis     E.  Left foot, third proximal metatarsal, excision:  -No evidence of osteomyelitis       Foot, Left; Tissue   1 Result Note  Culture 1+ Staphylococcus caprae Abnormal    Identification obtained by MALDI-TOF mass spectrometry research use only  database. Test characteristics determined and verified by the [facility].   1+ Staphylococcus simulans Abnormal    Susceptibilities not routinely done, refer to antibiogram to view typical susceptibility profiles      This specimen was received on a swab. Results may not be optimal. For maximum sensitivity of detection submit tissue, fluid or needle aspirate.        Resulting Agency: [facility]     Susceptibility     Staphylococcus caprae     JEFFREY     Ciprofloxacin <=0.5 ug/mL Susceptible     Clindamycin 0.25 ug/mL Susceptible     Daptomycin 0.5 ug/mL Susceptible     Doxycycline <=0.5 ug/mL Susceptible     Erythromycin <=0.25 ug/mL Susceptible     Gentamicin <=0.5 ug/mL Susceptible     Levofloxacin 0.25 ug/mL Susceptible     Oxacillin <=0.25 ug/mL Susceptible 1     Tetracycline <=1 ug/mL Susceptible     Trimethoprim/Sulfamethoxazole 1/19 ug/mL Susceptible     Vancomycin 1 ug/mL Susceptible              IMAGING:     IMPRESSION: Normal first through fifth transmetatarsal amputation.  Soft tissue swelling. No acute fracture.    I personally reviewed the images and agree with the reports as stated.    XR 3/5:   IMPRESSION: Normal joint spaces and alignment. No fracture. Achilles and plantar calcaneal spurs.     Transmetatarsal amputations, incompletely imaged. Arterial calcification    I personally reviewed the images and agree with the reports as stated.  No signs of fracture or artrhritis    ASSESSMENT:  Left foot TMA done on 6/18/24 --> now healed   Right foot submet 2 callus --> continues to be healed   Diabetes Mellitus --> hba1c 6.8     MEDICAL DECISION MAKING:   -Discussed all findings with patient. Chart and imaging reviewed.   -No open lesions to bilateral feet. Small calluses noted, discussed callus care and importance of this.     -Order placed for new CMOs for patient. He asks about an exception form being filled out so that he can get them prior to the one year ellen. Advised him to  print form and bring it in. Some time spent looking for one online, but there's too many listed to know which one is correct.     -Following this, patient returned stating he didn't need one and insurance was paying for the new ones. If needed, a form can still be filled out.     -Otherwise further follow up as needed. Recommend 6-8 weeks if needed or sooner for any new concerns: drainage, callus, swelling, pain, etc.         Tiny Soto DPM   [facility]                      Again, thank you for allowing me to participate in the care of your patient.        Sincerely,        Tiny Soto DPM    Electronically signed

## 2025-03-27 NOTE — PROGRESS NOTES
[facility]  CLINIC NOTE    CHIEF COMPLAINT:  left foot    PATIENT HISTORY:  Crispin Rojas is a 64 year old male  who presents for follow up for his left foot. Had a TMA on 6/18/2024. Site healed uneventfully. He was also seen in the hospital when he was admitted for a hand infection. At that time, there was no open lesions to his left foot, but there was a small callus to the plantar first metatarsal. He was advised of this and to slowly increase activity. Since then he hiked three miles and states the submet one site started draining. States now the site has been dry. Is now back to work. Denies pain to the site. Denies N/F/V/C/D.   9/11: Follows up for his left foot. States no new issues to the foot and has been doing well. States the ulcer on the plantar site is totally healed. Was recently admitted for cholecystitis and had his gallbladder removed. Having ongoing abdominal pain which he states is improving. Currently, not wearing dressings on his feet. Is getting closer to full activity. Has CMO in tennis shoe.   10/29: Follows up for bilateral feet. No pain or issues. Is up to full activity now, golfing and working. No draining sites. Does note that his left foot cracks from time to time. Otherwise denies N/F/V/C/D.     12/18: Follows up for bilateral feet. No new issues or concerns. Has been up and is at full activity. States no sites are draining and feels well. Denies N/F/V/C/D.     3/26: Follows up for bilateral feet. States has been doing well. Has been active and back to work. Does state that he had an issue with significant left ankle pain a few weeks ago. States came on suddenly and extended from dorsal foot to anterior leg. States resolved on his own and follow up with his PCP, no issues on xray. Primary request today is a new CMO prescription        Review of Systems:  A 10 point review of systems was performed and is positive for that noted above in the patient  history.  All other areas are negative.     PAST MEDICAL HISTORY:   Past Medical History:   Diagnosis Date    Antiplatelet or antithrombotic long-term use     Ascending aorta dilatation     Cerebral artery occlusion with cerebral infarction (H)     Cerebral infarction (H)     2010    Gastroesophageal reflux disease with esophagitis     H/O blood clots 2022    Hyperlipidemia LDL goal <70     Hypertension     Nonalcoholic steatohepatitis 11/30/2022    Numbness and tingling     Obesity     GILA (obstructive sleep apnea) 10/22/2018    CPAP intolerant    PVD (peripheral vascular disease)     s/p left partial toe amputation    Renal stone     Tremor     Type 2 diabetes mellitus with diabetic polyneuropathy, with long-term current use of insulin (H)         PAST SURGICAL HISTORY:   Past Surgical History:   Procedure Laterality Date    AMPUTATE FOOT Left 8/18/2016    Procedure: AMPUTATE FOOT;  Surgeon: Jack Younger DPM;  Location: [facility]    AMPUTATE TOE(S) Right 2/1/2016    Procedure: AMPUTATE TOE(S);  Surgeon: Rachelle Manriquez DPM, Pod;  Location: [facility]    AMPUTATE TOE(S) Right 8/2/2019    Procedure: Right fourth toe partial amputation for treatment of osteomyelitis.;  Surgeon: Jack Younger DPM;  Location: [facility]    AMPUTATE TOE(S) Left 11/8/2019    Procedure: Left second toe amputation at the metatarsophalangeal joint.;  Surgeon: Rachelle Manriquez DPM, Podiatry/Foot and Ankle Surgery;  Location: [facility]    AMPUTATE TOE(S) Right 6/5/2022    Procedure: AMPUTATION OF RIGHT GREAT TOE;  Surgeon: Wili Quiles DPM;  [facility]: [facility]    AMPUTATE TOE(S) Right 7/28/2022    Procedure: Right foot partial first metatarsal resection;  Surgeon: Tiny Soto DPM;  [facility]    AMPUTATE TOE(S) Left 6/18/2024    Procedure: Left foot transmetatarsal amputation;  Surgeon: Tiny Soto DPM;  Location: [facility]    ANGIOGRAM      BIOPSY BONE FOOT Right 7/24/2022    Procedure: Bone biopsy, right first metatarsal.;   Surgeon: Valentino Molina DPM;  Location: [facility]    COLONOSCOPY      COMBINED CYSTOSCOPY, RETROGRADES, URETEROSCOPY, INSERT STENT Left 8/21/2016    Procedure: COMBINED CYSTOSCOPY, RETROGRADES, URETEROSCOPY, INSERT STENT;  Surgeon: Artemio Valenzuela MD;  Location: [facility]    COMBINED CYSTOSCOPY, RETROGRADES, URETEROSCOPY, LASER HOLMIUM LITHOTRIPSY URETER(S), INSERT STENT Left 10/3/2016    Procedure: COMBINED CYSTOSCOPY, RETROGRADES, URETEROSCOPY, LASER HOLMIUM LITHOTRIPSY URETER(S), INSERT STENT;  Surgeon: Artemio Valenzuela MD;  [facility]    EXTRACORPOREAL SHOCK WAVE LITHOTRIPSY (ESWL) Left 9/8/2016    Procedure: EXTRACORPOREAL SHOCK WAVE LITHOTRIPSY (ESWL);  Surgeon: Artemio Valenzuela MD;  Location: [facility]    INCISION AND DRAINAGE FOOT, COMBINED Right 7/24/2022    Procedure: Incision and drainage, right foot ;  Surgeon: Valentino Molina DPM;  [facility]    IRRIGATION AND DEBRIDEMENT FOOT, COMBINED Left 10/19/2023    Procedure: Left foot second metatarsal resection , . Left plantar ulcer excisional debridement, down to and including tendon, with closure, site measuring 4 cm x 2 cm x 1 cm;  Surgeon: Tiny Soto DPM;  [facility]    IRRIGATION AND DEBRIDEMENT HAND, COMBINED Right 7/30/2024    Procedure: Irrigation and Debridement of Right Dorsal Hand and wrist;  Surgeon: June Mcdaniels MD;  [facility]    LAPAROSCOPIC CHOLECYSTECTOMY N/A 9/6/2024    Procedure: Laparoscopic cholecystectomy;  Surgeon: Aakash Meraz MD;  [facility]: [facility]    OSTEOTOMY FOOT Right 11/30/2022    Procedure: 1. Right second metatarsal floating osteotomy  2. Right second digit proximal phalanx arthroplasty 3. Right percutaneous flexor tenotomy;  Surgeon: Tiny Soto DPM;  [facility]: [facility]    OSTEOTOMY FOOT Right 1/19/2023    Procedure: Right foot third metatarsal head excision, ulcer debridement, matatarsal osteotomies;  Surgeon: Tiny Soto DPM;  Location: [facility]    RECESSION GASTROCNEMIUS Right 2/1/2016    Procedure: RECESSION GASTROCNEMIUS;  Surgeon: Rachelle Manriquez DPM, Pod;  [facility]: [facility]        MEDICATIONS:  Reviewed in Epic.     ALLERGIES:    Allergies   Allergen Reactions    Bactrim [Sulfamethoxazole-Trimethoprim] Nausea and Vomiting    Statins Swelling     Other reaction(s): Other (see comments)  SIMCOR caused edema in extremities  Only Simvastatin    Sulfatolamide Nausea and Vomiting    Niacin Swelling     SIMCOR caused edema in extremities    Simvastatin Swelling     SIMCOR caused edema in extremities    Sulfa Antibiotics Nausea        SOCIAL HISTORY:   Social History     Socioeconomic History    Marital status:      Spouse name: Sydney    Number of children: 2    Years of education: Not on file    Highest education level: Master's degree (e.g., MA, MS, [place], MEd, MSW, ELIANA)   Occupational History    Occupation: marketing     Employer: [employer]     Comment: [employer]   Tobacco Use    Smoking status: Never     Passive exposure: Never    Smokeless tobacco: Never   Vaping Use    Vaping status: Never Used   Substance and Sexual Activity    Alcohol use: Yes     Comment: rare---red wine 3x per month    Drug use: Never    Sexual activity: Not Currently     Partners: Female   Other Topics Concern    Parent/sibling w/ CABG, MI or angioplasty before 65F 55M? No   Social History Narrative    Not on file     Social Drivers of Health     Financial Resource Strain: Low Risk  (1/6/2025)    Financial Resource Strain     Within the past 12 months, have you or your family members you live with been unable to get utilities (heat, electricity) when it was really needed?: No   Food Insecurity: Low Risk  (1/6/2025)    Food Insecurity     Within the past 12 months, did you worry that your food would run out before you got money to buy more?: No     Within the past 12 months, did the food you bought just not last and you didn t have money to get more?: No   Transportation  Needs: Low Risk  (1/6/2025)    Transportation Needs     Within the past 12 months, has lack of transportation kept you from medical appointments, getting your medicines, non-medical meetings or appointments, work, or from getting things that you need?: No   Physical Activity: Sufficiently Active (1/6/2025)    Exercise Vital Sign     Days of Exercise per Week: 6 days     Minutes of Exercise per Session: 40 min   Stress: No Stress Concern Present (1/6/2025)    [employer] - Occupational Stress Questionnaire     Feeling of Stress : Not at all   Social Connections: Unknown (1/6/2025)    Social Connection and Isolation Panel [NHANES]     Frequency of Communication with Friends and Family: Not on file     Frequency of Social Gatherings with Friends and Family: Once a week     Attends [employer]ism Services: Not on file     Active Member of Clubs or Organizations: Not on file     Attends Club or Organization Meetings: Not on file     Marital Status: Not on file   Interpersonal Safety: Low Risk  (1/6/2025)    Interpersonal Safety     Do you feel physically and emotionally safe where you currently live?: Yes     Within the past 12 months, have you been hit, slapped, kicked or otherwise physically hurt by someone?: No     Within the past 12 months, have you been humiliated or emotionally abused in other ways by your partner or ex-partner?: No   Housing Stability: Low Risk  (1/6/2025)    Housing Stability     Do you have housing? : Yes     Are you worried about losing your housing?: No        FAMILY HISTORY:   Family History   Problem Relation Age of Onset    Arthritis Mother         SLE    Connective Tissue Disorder Mother         lupus    Diabetes Mother     Cerebrovascular Disease Mother     Cancer Mother     Diabetes Father     Coronary Artery Disease Father     Chronic Obstructive Pulmonary Disease Father     Diabetes Sister     Diabetes Maternal Grandfather         EXAM:Vitals: There were no  vitals taken for this visit.  BMI= There is no height or weight on file to calculate BMI.      General appearance: Patient is alert and fully cooperative with history & exam.  No sign of distress is noted during the visit.      Respiratory: Breathing is regular & unlabored while sitting.      HEENT: Hearing is intact to spoken word.  Speech is clear.  No gross evidence of visual impairment that would impact ambulation.      Dermatologic: Left foot TMA site: well closed and healed. No open lesions.  --> small submet one callus  Right foot also evaluated: no open lesions, preulcerative lesions or signs of infection.      Vascular: Dorsalis pedis and posterior tibial pulses are intact & regular bilaterally.  CFT and skin temperature is normal to both lower extremities.      Neurologic: Lower extremity sensation is diminished, bilateral foot, to light touch.  No evidence of neurological-based weakness or contracture in the lower extremities.       Musculoskeletal: Patient is ambulatory without an assistive device or brace.  S/p TMA on left foot.     Psychiatric: Affect is pleasant & appropriate.      Final Diagnosis   A.  Left forefoot, amputation:  -Surgical absence of the great and second toe  -Distal volar surface with deep ulcer with adjacent active, purulent osteomyelitis  -Tissues designated as margins without evidence of osteomyelitis     E.  Left foot, third proximal metatarsal, excision:  -No evidence of osteomyelitis       Foot, Left; Tissue   1 Result Note  Culture 1+ Staphylococcus caprae Abnormal    Identification obtained by MALDI-TOF mass spectrometry research use only database. Test characteristics determined and verified by the [facility].   1+ Staphylococcus simulans Abnormal    Susceptibilities not routinely done, refer to antibiogram to view typical susceptibility profiles      This specimen was received on a swab. Results may not be optimal. For maximum sensitivity of  detection submit tissue, fluid or needle aspirate.        Resulting Agency: [facility]     Susceptibility     Staphylococcus caprae     JEFFREY     Ciprofloxacin <=0.5 ug/mL Susceptible     Clindamycin 0.25 ug/mL Susceptible     Daptomycin 0.5 ug/mL Susceptible     Doxycycline <=0.5 ug/mL Susceptible     Erythromycin <=0.25 ug/mL Susceptible     Gentamicin <=0.5 ug/mL Susceptible     Levofloxacin 0.25 ug/mL Susceptible     Oxacillin <=0.25 ug/mL Susceptible 1     Tetracycline <=1 ug/mL Susceptible     Trimethoprim/Sulfamethoxazole 1/19 ug/mL Susceptible     Vancomycin 1 ug/mL Susceptible              IMAGING:     IMPRESSION: Normal first through fifth transmetatarsal amputation.  Soft tissue swelling. No acute fracture.    I personally reviewed the images and agree with the reports as stated.    XR 3/5:   IMPRESSION: Normal joint spaces and alignment. No fracture. Achilles and plantar calcaneal spurs.     Transmetatarsal amputations, incompletely imaged. Arterial calcification    I personally reviewed the images and agree with the reports as stated.  No signs of fracture or artrhritis    ASSESSMENT:  Left foot TMA done on 6/18/24 --> now healed   Right foot submet 2 callus --> continues to be healed   Diabetes Mellitus --> hba1c 6.8     MEDICAL DECISION MAKING:   -Discussed all findings with patient. Chart and imaging reviewed.   -No open lesions to bilateral feet. Small calluses noted, discussed callus care and importance of this.     -Order placed for new CMOs for patient. He asks about an exception form being filled out so that he can get them prior to the one year ellen. Advised him to print form and bring it in. Some time spent looking for one online, but there's too many listed to know which one is correct.     -Following this, patient returned stating he didn't need one and insurance was paying for the new ones. If needed, a form can still be filled out.     -Otherwise further follow up as needed. Recommend 6-8 weeks if  needed or sooner for any new concerns: drainage, callus, swelling, pain, etc.         Tiny Soto DPM   [facility]

## 2025-04-02 ENCOUNTER — TRANSFERRED RECORDS (OUTPATIENT)
Dept: HEALTH INFORMATION MANAGEMENT | Facility: CLINIC | Age: 65
End: 2025-04-02

## 2025-04-09 ENCOUNTER — TELEPHONE (OUTPATIENT)
Dept: PODIATRY | Facility: CLINIC | Age: 65
End: 2025-04-09
Payer: COMMERCIAL

## 2025-04-09 DIAGNOSIS — E11.42 TYPE 2 DIABETES MELLITUS WITH PERIPHERAL NEUROPATHY (H): Primary | ICD-10-CM

## 2025-04-10 RX ORDER — INSULIN ASPART 100 [IU]/ML
70 INJECTION, SOLUTION INTRAVENOUS; SUBCUTANEOUS
Qty: 60 ML | Refills: 0 | Status: SHIPPED | OUTPATIENT
Start: 2025-04-10

## 2025-04-10 NOTE — TELEPHONE ENCOUNTER
Requested Prescriptions   Signed Prescriptions Disp Refills    Insulin Aspart FlexPen 100 UNIT/ML SOPN 60 mL 0     Sig: Inject 70 Units subcutaneously daily with food. 1:7 CHO       Insulin Protocol Failed - 4/10/2025  8:38 AM        Failed - Chart review required     Review Chart.    Do not approve if insulin is used in a pump.  Instead, direct refill request to the patient's endocrinologist.  If the patient doesn't have an endocrinologist, then send the refill to the patient's PCP for review            Passed - HgbA1C in past 3 or 6 months     If HgbA1C is 8 or greater, it needs to be on file within the past 3 months.  If less than 8, must be on file within the past 6 months.     Recent Labs   Lab Test 02/14/25  0857   A1C 7.3*             Passed - Medication is active on med list and the sig matches. RN to manually verify dose and sig if red X/fail.     If the protocol passes (green check), you do not need to verify med dose and sig.    A prescription matches if they are the same clinical intention.    For Example: once daily and every morning are the same.    The protocol can not identify upper and lower case letters as matching and will fail.     For Example: Take 1 tablet (50 mg) by mouth daily     TAKE 1 TABLET (50 MG) BY MOUTH DAILY    For all fails (red x), verify dose and sig.    If the refill does match what is on file, the RN can still proceed to approve the refill request.       If they do not match, route to the appropriate provider.             Passed - Recent (6 mo) or future (90 days) visit within the authorizing provider's specialty     The patient must have completed an in-person or virtual visit within the past 6 months or has a future visit scheduled within the next 90 days with the authorizing provider s specialty.  Urgent care and e-visits do not quality as an office visit for this protocol.          Passed - Medication indicated for associated diagnosis     Medication is associated with one  or more of the following diagnoses:   - Type 1 diabetes mellitus  - Type 2 diabetes mellitus  - Diabetic nephropathy; Prophylaxis  - Neuropathy due to diabetes mellitus; Prophylaxis  - Retinopathy due to diabetes mellitus; Prophylaxis  - Diabetes mellitus associated with cystic fibrosis  - Disorder of cardiovascular system; Prophylaxis - Type 1 diabetes mellitus   - Disorder of cardiovascular system; Prophylaxis - Type 2 diabetes mellitus            Passed - Patient is 18 years of age or older

## 2025-04-14 ENCOUNTER — TELEPHONE (OUTPATIENT)
Dept: FAMILY MEDICINE | Facility: CLINIC | Age: 65
End: 2025-04-14
Payer: COMMERCIAL

## 2025-04-14 NOTE — TELEPHONE ENCOUNTER
Retail Pharmacy Prior Authorization Team   Phone: [phone]    PRIOR AUTHORIZATION DENIED    BRAND NAME COVERED    Medication: INSULIN ASPART FLEXPEN 100 UNIT/ML SC SOPN  Insurance Company: [employer] [place] - Phone [phone] Fax [phone]  Denial Date: 4/12/2025  Denial Reason(s): MUST USE PREFERRED PRODUCT - NOVOLIN OR NOVOLOG        Appeal Information: IF THE PROVIDER WOULD LIKE TO APPEAL THIS DECISION PLEASE PROVIDE THE PA TEAM WITH A LETTER OF MEDICAL NECESSITY      Patient Notified: NO

## 2025-05-24 ENCOUNTER — HEALTH MAINTENANCE LETTER (OUTPATIENT)
Age: 65
End: 2025-05-24

## 2025-05-29 ENCOUNTER — LAB (OUTPATIENT)
Dept: LAB | Facility: CLINIC | Age: 65
End: 2025-05-29
Payer: COMMERCIAL

## 2025-05-29 ENCOUNTER — RESULTS FOLLOW-UP (OUTPATIENT)
Dept: ENDOCRINOLOGY | Facility: CLINIC | Age: 65
End: 2025-05-29

## 2025-05-29 DIAGNOSIS — E11.42 TYPE 2 DIABETES MELLITUS WITH PERIPHERAL NEUROPATHY (H): ICD-10-CM

## 2025-05-29 DIAGNOSIS — R79.89 LOW TESTOSTERONE IN MALE: ICD-10-CM

## 2025-05-29 LAB
ERYTHROCYTE [DISTWIDTH] IN BLOOD BY AUTOMATED COUNT: 12.7 % (ref 10–15)
EST. AVERAGE GLUCOSE BLD GHB EST-MCNC: 166 MG/DL
HBA1C MFR BLD: 7.4 % (ref 0–5.6)
HCT VFR BLD AUTO: 38.5 % (ref 40–53)
HGB BLD-MCNC: 12.5 G/DL (ref 13.3–17.7)
MCH RBC QN AUTO: 28.9 PG (ref 26.5–33)
MCHC RBC AUTO-ENTMCNC: 32.5 G/DL (ref 31.5–36.5)
MCV RBC AUTO: 89 FL (ref 78–100)
PLATELET # BLD AUTO: 172 10E3/UL (ref 150–450)
RBC # BLD AUTO: 4.33 10E6/UL (ref 4.4–5.9)
WBC # BLD AUTO: 7.7 10E3/UL (ref 4–11)

## 2025-05-31 ENCOUNTER — RESULTS FOLLOW-UP (OUTPATIENT)
Dept: FAMILY MEDICINE | Facility: CLINIC | Age: 65
End: 2025-05-31

## 2025-06-03 LAB — TESTOST SERPL-MCNC: 342 NG/DL (ref 240–950)

## 2025-07-08 ENCOUNTER — OFFICE VISIT (OUTPATIENT)
Dept: ENDOCRINOLOGY | Facility: CLINIC | Age: 65
End: 2025-07-08
Payer: COMMERCIAL

## 2025-07-08 ENCOUNTER — OFFICE VISIT (OUTPATIENT)
Dept: FAMILY MEDICINE | Facility: CLINIC | Age: 65
End: 2025-07-08
Payer: COMMERCIAL

## 2025-07-08 VITALS
HEART RATE: 60 BPM | WEIGHT: 242.3 LBS | SYSTOLIC BLOOD PRESSURE: 170 MMHG | BODY MASS INDEX: 33.56 KG/M2 | DIASTOLIC BLOOD PRESSURE: 60 MMHG | OXYGEN SATURATION: 95 %

## 2025-07-08 VITALS
OXYGEN SATURATION: 94 % | TEMPERATURE: 97.7 F | WEIGHT: 238.8 LBS | DIASTOLIC BLOOD PRESSURE: 81 MMHG | RESPIRATION RATE: 18 BRPM | HEART RATE: 61 BPM | BODY MASS INDEX: 33.43 KG/M2 | SYSTOLIC BLOOD PRESSURE: 165 MMHG | HEIGHT: 71 IN

## 2025-07-08 DIAGNOSIS — E66.812 CLASS 2 SEVERE OBESITY DUE TO EXCESS CALORIES WITH SERIOUS COMORBIDITY AND BODY MASS INDEX (BMI) OF 35.0 TO 35.9 IN ADULT (H): ICD-10-CM

## 2025-07-08 DIAGNOSIS — Z00.00 ROUTINE GENERAL MEDICAL EXAMINATION AT A HEALTH CARE FACILITY: ICD-10-CM

## 2025-07-08 DIAGNOSIS — Z86.718 PERSONAL HISTORY OF DVT (DEEP VEIN THROMBOSIS): ICD-10-CM

## 2025-07-08 DIAGNOSIS — K75.81 LIVER CIRRHOSIS SECONDARY TO NASH (H): ICD-10-CM

## 2025-07-08 DIAGNOSIS — E11.42 TYPE 2 DIABETES MELLITUS WITH PERIPHERAL NEUROPATHY (H): ICD-10-CM

## 2025-07-08 DIAGNOSIS — Z00.00 ROUTINE GENERAL MEDICAL EXAMINATION AT A HEALTH CARE FACILITY: Primary | ICD-10-CM

## 2025-07-08 DIAGNOSIS — I10 BENIGN ESSENTIAL HYPERTENSION: ICD-10-CM

## 2025-07-08 DIAGNOSIS — D50.9 IRON DEFICIENCY ANEMIA, UNSPECIFIED IRON DEFICIENCY ANEMIA TYPE: ICD-10-CM

## 2025-07-08 DIAGNOSIS — R79.89 LOW TESTOSTERONE IN MALE: ICD-10-CM

## 2025-07-08 DIAGNOSIS — R25.2 BILATERAL LEG CRAMPS: ICD-10-CM

## 2025-07-08 DIAGNOSIS — I82.542 CHRONIC DEEP VEIN THROMBOSIS (DVT) OF TIBIAL VEIN OF LEFT LOWER EXTREMITY (H): ICD-10-CM

## 2025-07-08 DIAGNOSIS — N18.30 DIABETES MELLITUS DUE TO UNDERLYING CONDITION WITH STAGE 3 CHRONIC KIDNEY DISEASE, UNSPECIFIED WHETHER LONG TERM INSULIN USE, UNSPECIFIED WHETHER STAGE 3A OR 3B CKD (H): ICD-10-CM

## 2025-07-08 DIAGNOSIS — E08.22 DIABETES MELLITUS DUE TO UNDERLYING CONDITION WITH STAGE 3 CHRONIC KIDNEY DISEASE, UNSPECIFIED WHETHER LONG TERM INSULIN USE, UNSPECIFIED WHETHER STAGE 3A OR 3B CKD (H): ICD-10-CM

## 2025-07-08 DIAGNOSIS — K74.60 LIVER CIRRHOSIS SECONDARY TO NASH (H): ICD-10-CM

## 2025-07-08 DIAGNOSIS — E11.42 TYPE 2 DIABETES MELLITUS WITH PERIPHERAL NEUROPATHY (H): Primary | ICD-10-CM

## 2025-07-08 DIAGNOSIS — E66.01 CLASS 2 SEVERE OBESITY DUE TO EXCESS CALORIES WITH SERIOUS COMORBIDITY AND BODY MASS INDEX (BMI) OF 35.0 TO 35.9 IN ADULT (H): ICD-10-CM

## 2025-07-08 DIAGNOSIS — Z13.6 SCREENING FOR CARDIOVASCULAR CONDITION: Primary | ICD-10-CM

## 2025-07-08 LAB
ANION GAP SERPL CALCULATED.3IONS-SCNC: 10 MMOL/L (ref 7–15)
BUN SERPL-MCNC: 19.2 MG/DL (ref 8–23)
CALCIUM SERPL-MCNC: 9.1 MG/DL (ref 8.8–10.4)
CHLORIDE SERPL-SCNC: 105 MMOL/L (ref 98–107)
CREAT SERPL-MCNC: 1.05 MG/DL (ref 0.67–1.17)
EGFRCR SERPLBLD CKD-EPI 2021: 80 ML/MIN/1.73M2
ERYTHROCYTE [DISTWIDTH] IN BLOOD BY AUTOMATED COUNT: 12.5 % (ref 10–15)
GLUCOSE SERPL-MCNC: 165 MG/DL (ref 70–99)
HCO3 SERPL-SCNC: 25 MMOL/L (ref 22–29)
HCT VFR BLD AUTO: 38.5 % (ref 40–53)
HGB BLD-MCNC: 12.6 G/DL (ref 13.3–17.7)
MCH RBC QN AUTO: 28.9 PG (ref 26.5–33)
MCHC RBC AUTO-ENTMCNC: 32.7 G/DL (ref 31.5–36.5)
MCV RBC AUTO: 88 FL (ref 78–100)
PLATELET # BLD AUTO: 149 10E3/UL (ref 150–450)
POTASSIUM SERPL-SCNC: 4.8 MMOL/L (ref 3.4–5.3)
RBC # BLD AUTO: 4.36 10E6/UL (ref 4.4–5.9)
SODIUM SERPL-SCNC: 140 MMOL/L (ref 135–145)
WBC # BLD AUTO: 5.2 10E3/UL (ref 4–11)

## 2025-07-08 PROCEDURE — 1126F AMNT PAIN NOTED NONE PRSNT: CPT | Performed by: NURSE PRACTITIONER

## 2025-07-08 PROCEDURE — 99214 OFFICE O/P EST MOD 30 MIN: CPT | Performed by: NURSE PRACTITIONER

## 2025-07-08 PROCEDURE — 80048 BASIC METABOLIC PNL TOTAL CA: CPT | Performed by: FAMILY MEDICINE

## 2025-07-08 PROCEDURE — 3077F SYST BP >= 140 MM HG: CPT | Performed by: FAMILY MEDICINE

## 2025-07-08 PROCEDURE — 36415 COLL VENOUS BLD VENIPUNCTURE: CPT | Performed by: FAMILY MEDICINE

## 2025-07-08 PROCEDURE — 3078F DIAST BP <80 MM HG: CPT | Performed by: NURSE PRACTITIONER

## 2025-07-08 PROCEDURE — 99396 PREV VISIT EST AGE 40-64: CPT | Performed by: FAMILY MEDICINE

## 2025-07-08 PROCEDURE — 3078F DIAST BP <80 MM HG: CPT | Performed by: FAMILY MEDICINE

## 2025-07-08 PROCEDURE — 85027 COMPLETE CBC AUTOMATED: CPT | Performed by: FAMILY MEDICINE

## 2025-07-08 PROCEDURE — 3077F SYST BP >= 140 MM HG: CPT | Performed by: NURSE PRACTITIONER

## 2025-07-08 PROCEDURE — 1126F AMNT PAIN NOTED NONE PRSNT: CPT | Performed by: FAMILY MEDICINE

## 2025-07-08 RX ORDER — TESTOSTERONE CYPIONATE 1000 MG/10ML
50 INJECTION, SOLUTION INTRAMUSCULAR
Qty: 10 ML | Refills: 1 | Status: SHIPPED | OUTPATIENT
Start: 2025-07-08

## 2025-07-08 RX ORDER — LISINOPRIL 40 MG/1
40 TABLET ORAL DAILY
Qty: 90 TABLET | Refills: 4 | Status: SHIPPED | OUTPATIENT
Start: 2025-07-08

## 2025-07-08 RX ORDER — APIXABAN 5 MG/1
5 TABLET, FILM COATED ORAL 2 TIMES DAILY
Qty: 180 TABLET | Refills: 4 | Status: SHIPPED | OUTPATIENT
Start: 2025-07-08

## 2025-07-08 RX ORDER — AMLODIPINE BESYLATE 5 MG/1
10 TABLET ORAL DAILY
Qty: 90 TABLET | Refills: 4 | Status: SHIPPED | OUTPATIENT
Start: 2025-07-08

## 2025-07-08 SDOH — HEALTH STABILITY: PHYSICAL HEALTH: ON AVERAGE, HOW MANY DAYS PER WEEK DO YOU ENGAGE IN MODERATE TO STRENUOUS EXERCISE (LIKE A BRISK WALK)?: 7 DAYS

## 2025-07-08 SDOH — HEALTH STABILITY: PHYSICAL HEALTH: ON AVERAGE, HOW MANY MINUTES DO YOU ENGAGE IN EXERCISE AT THIS LEVEL?: 60 MIN

## 2025-07-08 ASSESSMENT — ANXIETY QUESTIONNAIRES
7. FEELING AFRAID AS IF SOMETHING AWFUL MIGHT HAPPEN: NOT AT ALL
3. WORRYING TOO MUCH ABOUT DIFFERENT THINGS: NOT AT ALL
1. FEELING NERVOUS, ANXIOUS, OR ON EDGE: NOT AT ALL
6. BECOMING EASILY ANNOYED OR IRRITABLE: NOT AT ALL
1. FEELING NERVOUS, ANXIOUS, OR ON EDGE: NOT AT ALL
GAD7 TOTAL SCORE: 0
GAD7 TOTAL SCORE: 0
7. FEELING AFRAID AS IF SOMETHING AWFUL MIGHT HAPPEN: NOT AT ALL
IF YOU CHECKED OFF ANY PROBLEMS ON THIS QUESTIONNAIRE, HOW DIFFICULT HAVE THESE PROBLEMS MADE IT FOR YOU TO DO YOUR WORK, TAKE CARE OF THINGS AT HOME, OR GET ALONG WITH OTHER PEOPLE: SOMEWHAT DIFFICULT
5. BEING SO RESTLESS THAT IT IS HARD TO SIT STILL: NOT AT ALL
4. TROUBLE RELAXING: NOT AT ALL
GAD7 TOTAL SCORE: 0
2. NOT BEING ABLE TO STOP OR CONTROL WORRYING: NOT AT ALL
2. NOT BEING ABLE TO STOP OR CONTROL WORRYING: NOT AT ALL
4. TROUBLE RELAXING: NOT AT ALL
7. FEELING AFRAID AS IF SOMETHING AWFUL MIGHT HAPPEN: NOT AT ALL
5. BEING SO RESTLESS THAT IT IS HARD TO SIT STILL: NOT AT ALL
GAD7 TOTAL SCORE: 0
3. WORRYING TOO MUCH ABOUT DIFFERENT THINGS: NOT AT ALL
8. IF YOU CHECKED OFF ANY PROBLEMS, HOW DIFFICULT HAVE THESE MADE IT FOR YOU TO DO YOUR WORK, TAKE CARE OF THINGS AT HOME, OR GET ALONG WITH OTHER PEOPLE?: SOMEWHAT DIFFICULT
IF YOU CHECKED OFF ANY PROBLEMS ON THIS QUESTIONNAIRE, HOW DIFFICULT HAVE THESE PROBLEMS MADE IT FOR YOU TO DO YOUR WORK, TAKE CARE OF THINGS AT HOME, OR GET ALONG WITH OTHER PEOPLE: SOMEWHAT DIFFICULT
6. BECOMING EASILY ANNOYED OR IRRITABLE: NOT AT ALL

## 2025-07-08 ASSESSMENT — PAIN SCALES - GENERAL
PAINLEVEL_OUTOF10: NO PAIN (0)
PAINLEVEL_OUTOF10: NO PAIN (0)

## 2025-07-08 ASSESSMENT — PATIENT HEALTH QUESTIONNAIRE - PHQ9
SUM OF ALL RESPONSES TO PHQ QUESTIONS 1-9: 1
10. IF YOU CHECKED OFF ANY PROBLEMS, HOW DIFFICULT HAVE THESE PROBLEMS MADE IT FOR YOU TO DO YOUR WORK, TAKE CARE OF THINGS AT HOME, OR GET ALONG WITH OTHER PEOPLE: SOMEWHAT DIFFICULT

## 2025-07-08 ASSESSMENT — SOCIAL DETERMINANTS OF HEALTH (SDOH): HOW OFTEN DO YOU GET TOGETHER WITH FRIENDS OR RELATIVES?: MORE THAN THREE TIMES A WEEK

## 2025-07-08 NOTE — PROGRESS NOTES
Preventive Care Visit  [facility]  Johann Serna DO, Family Medicine  Jul 8, 2025  {Provider  Link to SmartSet :695320}    {PROVIDER CHARTING PREFERENCE:609611}    Subjective   Pat is a 65 year old, presenting for the following:  Diabetes (Pt here to follow up on Diabetes Management. ) and Physical (Pt here for annual preventative visit. Pt states he likes to get 2 physicals visit in a year due to health concerns. Pt is fasting for labs. )        7/8/2025     7:24 AM   Additional Questions   Roomed by Cecilia Stanton MA Extern   Accompanied by Self   {ROOMER positive Fall Risk- Gait Speed Test is required click here to document the Gait Speed Test and then refresh the note to pull in results  :052129}      Healthy Habits:     Taking medications regularly:  0  History of Present Illness       Reason for visit:  Semi-annual physical   He is taking medications regularly.    ***   {MA/LPN/RN Pre-Provider Visit Orders- hCG/UA/Strep (Optional):476141}  {SUPERLIST (Optional):045030}  {additonal problems for provider to add (Optional):098853}  Advance Care Planning  {The storyboard will display whether the patient has ACP docs on file. Hover over the Code section in the storyboard to access the ACP documents. :934279}  Document on file is a Health Care Directive or POLST.        7/8/2025   General Health   How would you rate your overall physical health? Good   Feel stress (tense, anxious, or unable to sleep) Not at all         7/8/2025   Nutrition   Three or more servings of calcium each day? (!) NO   Diet: Diabetic   How many servings of fruit and vegetables per day? (!) 2-3   How many sweetened beverages each day? 0-1         7/8/2025   Exercise   Days per week of moderate/strenous exercise 7 days   Average minutes spent exercising at this level 60 min         7/8/2025   Social Factors   Frequency of gathering with friends or relatives More than three times a week   Worry food won't last  until get money to buy more No   Food not last or not have enough money for food? No   Do you have housing? (Housing is defined as stable permanent housing and does not include staying outside in a car, in a tent, in an abandoned building, in an overnight shelter, or couch-surfing.) Yes   Are you worried about losing your housing? No   Lack of transportation? No   Unable to get utilities (heat,electricity)? No         7/8/2025   Fall Risk   Fallen 2 or more times in the past year? Yes    Trouble with walking or balance? No        Proxy-reported     {Positive Fall Risk- Gait Speed Test is required and was not documented before note was started.  If results do not appear, ask staff to complete.  Once completed, refresh note to pull in results Click here to link Gait Speed Test  :611449}      7/8/2025   Dental   Dentist two times every year? Yes       Today's PHQ-9 Score:       7/8/2025     7:30 AM   PHQ-9 SCORE   PHQ-9 Total Score MyChart 1 (Minimal depression)   PHQ-9 Total Score 1        Proxy-reported         7/8/2025   Substance Use   Alcohol more than 3/day or more than 7/wk No   Do you use any other substances recreationally? No     Social History     Tobacco Use    Smoking status: Never     Passive exposure: Never    Smokeless tobacco: Never   Vaping Use    Vaping status: Never Used   Substance Use Topics    Alcohol use: Yes     Comment: rare---red wine 3x per month    Drug use: Never     {Provider  If there are gaps in the social history shown above, please follow the link to update and then refresh the note Link to Social and Substance History :138823}      7/8/2025   STI Screening   New sexual partner(s) since last STI/HIV test? No   Last PSA:   PSA   Date Value Ref Range Status   12/05/2016 0.31 0 - 4 ug/L Final     Comment:     Assay Method:  Chemiluminescence using Siemens Vista analyzer     Prostate Specific Antigen Screen   Date Value Ref Range Status   01/06/2025 0.47 0.00 - 4.50 ng/mL Final  "  01/03/2022 0.44 0.00 - 4.00 ug/L Final     ASCVD Risk   The ASCVD Risk score (Rahul SEGURA, et al., 2019) failed to calculate for the following reasons:    Risk score cannot be calculated because patient has a medical history suggesting prior/existing ASCVD    {Link to Fracture Risk Assessment Tool (Optional):752083}    {Provider  REQUIRED FOR AWV Use the storyboard to review patient history, after sections have been marked as reviewed, refresh note to capture documentation:095503}   Reviewed and updated as needed this visit by Provider                    {HISTORY OPTIONS (Optional):524356}    {ROS Picklists (Optional):351062}    Does not used amonia lactate on foot calouses.     Prior use of pumice stone has irritated skin around calouses.     Fell at work with no current concerns.     Started hydrochlorothiazide treatment about 2-3 weeks ago through cardiology.     Will follow up in about 6 months.     Gets bilateral leg cramps about once/week. Will do PT referral.        Objective    Exam  BP (!) 165/81 (BP Location: Right arm, Patient Position: Sitting, Cuff Size: Adult Regular)   Pulse 61   Temp 97.7  F (36.5  C) (Oral)   Resp 18   Ht 1.81 m (5' 11.25\")   Wt 108.3 kg (238 lb 12.8 oz)   SpO2 94%   BMI 33.07 kg/m     Estimated body mass index is 33.07 kg/m  as calculated from the following:    Height as of this encounter: 1.81 m (5' 11.25\").    Weight as of this encounter: 108.3 kg (238 lb 12.8 oz).    Physical Exam  {Exam Choices (Optional):657852}    Bilateral foot caluses, right more thick with some partial thickness fissures.     Signed Electronically by: Johann Serna DO  {Email feedback regarding this note to [email or web address]   :430769}  " "as needed this visit by Provider                            Does not used amonia lactate on foot calouses.  I encouraged him to use this, and to consider using an electric foot skin grinding tool to help smooth out and soften skin.    Prior use of pumice stone has irritated skin around calouses.     Fell at work with no current concerns.     Started hydrochlorothiazide treatment about 2-3 weeks ago through cardiology.     Will follow up in about 6 months.     Gets bilateral lower leg cramps about once/week. Will do PT referral.     Overall, patient feels well at time of visit.  He is happy with the progress he has made addressing his foot wounds.       Objective    Exam  BP (!) 165/81 (BP Location: Right arm, Patient Position: Sitting, Cuff Size: Adult Regular)   Pulse 61   Temp 97.7  F (36.5  C) (Oral)   Resp 18   Ht 1.81 m (5' 11.25\")   Wt 108.3 kg (238 lb 12.8 oz)   SpO2 94%   BMI 33.07 kg/m     Estimated body mass index is 33.07 kg/m  as calculated from the following:    Height as of this encounter: 1.81 m (5' 11.25\").    Weight as of this encounter: 108.3 kg (238 lb 12.8 oz).    Physical Exam  General: Vital signs reviewed.  Patient is in no acute appearing distress.  Breathing appears nonlabored.  Patient is alert and oriented ×3.      ENT: Ear exam shows bilateral tympanic membranes to be clear without injection, nasal turbinates show no injection or edema, no pharyngeal injection or exudate.    Neck: supple with no adenoapthy, palpable abnormal masses, or thyroid abnormality.    Eyes: No scleral, lid, or periorbital injection or edema noted.  No eye mattering noted.  Corneas are clear. Pupils are equal round and reactive to light with normal consensual eye movement.    Heart: Heart rate is regular without murmur.    Lungs: Lungs are clear to auscultation with good airflow bilaterally.    Abdomen:  Abdomen is soft, nontender.  No palpable abnormal masses or organomegaly.  Bowel sounds are " normal.    Genital exam: Patient declined exam for possible hernia.    Back: No areas of tenderness.    Skin: Warm and dry, with no rash or abnormal lesions noted.    Extremities: No lower leg edema noted.  No joint edema or restricted range of motion noted.    Neuro: No acute focal deficits or other abnormalities noted.    Psych: Patient is pleasant, making good eye contact, with clear and fluent speech.  Answers questions appropriately. No psychomotor agitation.    Diabetic foot exam shows skin to be intact and smooth.  Sensory testing with filament shows diminished bilateral sensation.  Patient has a normal bilateral dorsalis pedis pulse.  Patient has bilateral foot caluses, right more thick with some partial thickness fissures.  Patient has had multiple toe amputations.    Signed Electronically by: Johann Serna DO

## 2025-07-08 NOTE — PROGRESS NOTES
"  {PROVIDER CHARTING PREFERENCE:139260}    Subjective   Pat is a 65 year old, presenting for the following health issues:  Diabetes (Pt here to follow up on Diabetes Management. ) and Physical (Pt here for annual preventative visit. Pt states he likes to get 2 physicals visit in a year due to health concerns. Pt is fasting for labs. )        7/8/2025     7:24 AM   Additional Questions   Roomed by Cecilia Stanton MA Extern   Accompanied by Self     Healthy Habits:     Taking medications regularly:  0  History of Present Illness       Reason for visit:  Semi-annual physical   He is taking medications regularly.        {SUPERLIST (Optional):312533}  {additonal problems for provider to add (Optional):528179}    {ROS Picklists (Optional):528679}            Objective    BP (!) 165/81 (BP Location: Right arm, Patient Position: Sitting, Cuff Size: Adult Regular)   Pulse 61   Temp 97.7  F (36.5  C) (Oral)   Resp 18   Ht 1.81 m (5' 11.25\")   Wt 108.3 kg (238 lb 12.8 oz)   SpO2 94%   BMI 33.07 kg/m    Body mass index is 33.07 kg/m .  Physical Exam   {Exam List (Optional):849661}    {Diagnostic Test Results (Optional):523485}        Signed Electronically by: Johann Serna DO  {Email feedback regarding this note to [email or web address]   :388840}  "

## 2025-07-08 NOTE — LETTER
7/8/2025      Crispin Rojas  [address] [facility] [address]      Dear Colleague,    Thank you for referring your patient, Crispin Rojas, to the [facility]. Please see a copy of my visit note below.    [facility] ENDOCRINOLOGY    Diabetes Note 7/8/2025    Crispin Rojas, 1960, [id number]          Reason for visit      1. Type 2 diabetes mellitus with peripheral neuropathy (H)        HPI     Crispin Rojas is a very pleasant 65 year old old male who presents for follow up.  SUMMARY:    Judie is seen today in follow-up for DM 2. His current A1c is 7.4 and similar to his previous of 7.3. he is currently using the [employer] Freestyle CGM, which was downloaded and data was reviewed.     TIR was 67%. Above, 7% and below, 26%. GMI of 5.7% 2/2 his hypoglycemia.     Pt was unaware that he was having this much in the way of hypoglycemia. Informed that we hope for <4%. He does vary his Tresiba dose, however because of the nature of this insulin, small changes in dosing will likely not impact his day to day. He is working at [employer] right now and moving a lot, as well as moving heavy things a lot. He is regularly having lows mid day. He can vary his lunch times and because of his breakfast/morning snack choices, in addition to all of the movement, this contributes to lows. He is inconsistent with his protein in the morning and often only eats CHO. Strongly urged to have protein regularly.     Also noted today, that he is hypertensive, and apparently this has been going on for a bit. As we are sitting talking, he realizes that he has been eating pretzels daily and thinks that maybe its the added salt that has caused the increase.         Blood glucose data:      Past Medical History     Patient Active Problem List   Diagnosis     Calculus of kidney     Chronic left shoulder pain     Type 2 diabetes mellitus with peripheral neuropathy (H)     Hyperlipidemia LDL goal <70      Essential hypertension     Right bundle branch block     GILA (obstructive sleep apnea)     PVD (peripheral vascular disease)     Scoliosis of thoracic spine, unspecified scoliosis type     Other sequelae following unspecified cerebrovascular disease     Cervical pain     Bilateral thoracic back pain     Osteomyelitis of great toe of right foot (H)     Acute deep vein thrombosis (DVT) of tibial vein of right lower extremity (H)     Abnormal CT of liver     Benign neoplasm of transverse colon     Duodenitis     Hemorrhoids     Polyp of colon     Acute osteomyelitis of right foot (H)     Essential tremor     Nonalcoholic steatohepatitis     Otalgia of left ear     Shoulder pain     Liver cirrhosis secondary to SNYDER (H)     Gastro-esophageal reflux disease with esophagitis     Chronic osteomyelitis of right foot (H)     Chronic foot ulcer with fat layer exposed, left (H)     Class 2 severe obesity due to excess calories with serious comorbidity in adult (H)     Osteomyelitis of left foot, unspecified type (H)     Cellulitis of right upper extremity     Dog bite of hand, right, initial encounter     Cholecystitis     RUQ abdominal pain        Family History       family history includes Arthritis in his mother; Cancer in his mother; Cerebrovascular Disease in his mother; Chronic Obstructive Pulmonary Disease in his father; Connective Tissue Disorder in his mother; Coronary Artery Disease in his father; Diabetes in his father, maternal grandfather, mother, and sister.    Social History      reports that he has never smoked. He has never been exposed to tobacco smoke. He has never used smokeless tobacco. He reports current alcohol use. He reports that he does not use drugs.      Review of Systems     Patient has no polyuria or polydipsia, no chest pain, dyspnea or TIA's, no numbness, tingling or pain in extremities  Remainder negative except as noted in HPI.    Vital Signs     BP (!) 170/60 (BP Location: Right arm, Patient  "Position: Sitting, Cuff Size: Adult Large)   Pulse 60   Wt 109.9 kg (242 lb 4.8 oz)   SpO2 95%   BMI 33.56 kg/m    Wt Readings from Last 3 Encounters:   07/08/25 109.9 kg (242 lb 4.8 oz)   07/08/25 108.3 kg (238 lb 12.8 oz)   06/06/25 114.7 kg (252 lb 14.4 oz)       Physical Exam     Constitutional:  Well developed, Well nourished  HENT:  Normocephalic,   Neck: Thyroid normal, No lymph nodes, Supple  Eyes:  PERRL, Conjunctiva pink  Respiratory:  Normal breath sounds, No respiratory distress  Cardiovascular:  Normal heart rate, Normal rhythm, No murmurs  GI:  Bowel sounds normal, Soft, No tenderness  Musculoskeletal:  No gross deformity or lesions, normal dorsalis pedis pulses  Skin: No acanthosis nigricans, lipoatrophy or lipodystrophy  Neurologic:  Alert & oriented x 3, nonfocal  Psychiatric:  Affect, Mood, Insight appropriate  Diabetic foot exam: no ulcers, charcot's or high risk calluses,    Assessment     1. Type 2 diabetes mellitus with peripheral neuropathy (H)        Plan     Will have him reduce his Tresiba dose to 35 units daily and keep it there. He is going to to stop with the Pretzels too. Follow-up with me in September.       Shira Montanez NP  [facility]  7/8/2025  4:36 PM      Lab Results     No results found for: \"HGBA1C\", \"CREATININE\", \"MICROALBUR\"    Cholesterol   Date Value Ref Range Status   01/06/2025 97 <200 mg/dL Final   06/22/2021 95 <200 mg/dL Final     HDL Cholesterol   Date Value Ref Range Status   06/22/2021 42 >39 mg/dL Final     Direct Measure HDL   Date Value Ref Range Status   01/06/2025 35 (L) >=40 mg/dL Final     Triglycerides   Date Value Ref Range Status   01/06/2025 81 <150 mg/dL Final   06/22/2021 42 <150 mg/dL Final     Comment:     Fasting specimen       [unfilled]      Current Medications     Outpatient Medications Prior to Visit   Medication Sig Dispense Refill     amLODIPine (NORVASC) 5 MG tablet Take 2 tablets (10 mg) by mouth daily. 90 tablet 4     atorvastatin " "(LIPITOR) 40 MG tablet Take 40 mg by mouth at bedtime       calcium carbonate (OS-NARA) 500 MG tablet Take 1 tablet by mouth 2 times daily       Cholecalciferol (VITAMIN D3) 50 MCG (2000 UT) CAPS Take 1 capsule by mouth every morning.       Co-Enzyme Q10 100 MG CAPS Take 100 mg by mouth daily        Continuous Glucose Sensor (FREESTYLE LUCERO 14 DAY SENSOR) MISC 1 each every 14 days. every 14 days. 6 each 0     ELIQUIS ANTICOAGULANT 5 MG tablet Take 1 tablet (5 mg) by mouth 2 times daily. 180 tablet 4     ferrous sulfate (FEROSUL) 325 (65 Fe) MG tablet Take 325 mg by mouth daily (with breakfast)       gabapentin (NEURONTIN) 100 MG capsule Take 400 mg by mouth 3 times daily       gentamicin (GARAMYCIN) 0.1 % external ointment Apply topically as needed       hydrochlorothiazide (HYDRODIURIL) 25 MG tablet Take 1 tablet (25 mg) by mouth daily. 90 tablet 3     Insulin Aspart FlexPen 100 UNIT/ML SOPN Inject 70 Units subcutaneously daily with food. 1:7 CHO 60 mL 0     insulin degludec (TRESIBA FLEXTOUCH) 100 UNIT/ML pen ADMINISTER 50 TO 55 UNITS UNDER THE SKIN DAILY 45 mL 0     ketoconazole (NIZORAL) 2 % external shampoo Apply topically twice a week On Tuesday and Friday       lisinopril (ZESTRIL) 40 MG tablet Take 1 tablet (40 mg) by mouth daily. 90 tablet 4     magnesium gluconate (MAGONATE) 500 MG tablet Take 500 mg by mouth 3 times daily       metFORMIN (GLUCOPHAGE XR) 500 MG 24 hr tablet TAKE 2 TABLETS(1000 MG) BY MOUTH TWICE DAILY 360 tablet 0     Multiple Vitamins-Minerals (MULTIVITAMIN PO) Take 1 tablet by mouth daily        Needle, Disp, (B-D DISP NEEDLE) 25G X 1\" MISC 1 each every 14 days. 100 each 0     omeprazole (PRILOSEC) 40 MG DR capsule Take 40 mg by mouth daily       OZEMPIC, 2 MG/DOSE, 8 MG/3ML pen INJECT 2 MG UNDER THE SKIN EVERY 7 DAYS 9 mL 0     propranolol (INDERAL) 20 MG tablet Take 20 mg by mouth 2 times daily       Syringe/Needle, Disp, (BD LUER-JOCELINE SYRINGE) 25G X 1-1/2\" 3 ML MISC Use one every 14 " days as directed. 100 each 0     tadalafil (CIALIS) 5 MG tablet TAKE 1 TABLET BY MOUTH EVERY DAY AS NEEDED one hour before intercourse 30 tablet 1     testosterone cypionate (DEPOTESTOSTERONE) 100 MG/ML injection Inject 0.5 mLs (50 mg) into the muscle every 14 days. 10 mL 1     No facility-administered medications prior to visit.           Again, thank you for allowing me to participate in the care of your patient.        Sincerely,        Shira Montanez NP    Electronically signed

## 2025-07-08 NOTE — PROGRESS NOTES
Preventive Care Visit  [facility]  Johann Serna DO, Family Medicine  Jul 8, 2025  {Provider  Link to [facility] :145605}    {PROVIDER CHARTING PREFERENCE:400622}    Subjective   Pat is a 65 year old, presenting for the following:  Diabetes (Pt here to follow up on Diabetes Management. ) and Physical (Pt here for annual preventative visit. Pt states he likes to get 2 physicals visit in a year due to health concerns. )        7/8/2025     7:24 AM   Additional Questions   Roomed by Cecilia Stanton MA Extern   Accompanied by Self          Healthy Habits:     Taking medications regularly:  0  History of Present Illness       Reason for visit:  Semi-annual physical   He is taking medications regularly.       {SUPERLIST (Optional):753382}  {additonal problems for provider to add (Optional):390159}  Advance Care Planning  {The storyboard will display whether the patient has ACP docs on file. Hover over the Code section in the storyboard to access the ACP documents. :574759}  {(AWV REQUIRED) Advance Care Planning Reviewed:494841}        1/6/2025   General Health   How would you rate your overall physical health? Good   Feel stress (tense, anxious, or unable to sleep) Not at all         1/6/2025   Nutrition   Three or more servings of calcium each day? Yes   Diet: Diabetic   How many servings of fruit and vegetables per day? (!) 2-3   How many sweetened beverages each day? 0-1         1/6/2025   Exercise   Days per week of moderate/strenous exercise 6 days   Average minutes spent exercising at this level 40 min         1/6/2025   Social Factors   Frequency of gathering with friends or relatives Once a week   Worry food won't last until get money to buy more No   Food not last or not have enough money for food? No   Do you have housing? (Housing is defined as stable permanent housing and does not include staying outside in a car, in a tent, in an abandoned building, in an overnight shelter, or  couch-surfing.) Yes   Are you worried about losing your housing? No   Lack of transportation? No   Unable to get utilities (heat,electricity)? No         1/6/2025   Dental   Dentist two times every year? Yes       Today's PHQ-9 Score:       7/8/2025     7:14 AM   PHQ-9 SCORE   PHQ-9 Total Score MyChart 1 (Minimal depression)   PHQ-9 Total Score 1        Patient-reported         1/6/2025   Substance Use   Alcohol more than 3/day or more than 7/wk No   Do you use any other substances recreationally? No     Social History     Tobacco Use    Smoking status: Never     Passive exposure: Never    Smokeless tobacco: Never   Vaping Use    Vaping status: Never Used   Substance Use Topics    Alcohol use: Yes     Comment: rare---red wine 3x per month    Drug use: Never     {Provider  If there are gaps in the social history shown above, please follow the link to update and then refresh the note Link to Social and Substance History :347948}  Last PSA:   PSA   Date Value Ref Range Status   12/05/2016 0.31 0 - 4 ug/L Final     Comment:     Assay Method:  Chemiluminescence using Siemens Vista analyzer     Prostate Specific Antigen Screen   Date Value Ref Range Status   01/06/2025 0.47 0.00 - 4.50 ng/mL Final   01/03/2022 0.44 0.00 - 4.00 ug/L Final     ASCVD Risk   The ASCVD Risk score (Rahul DK, et al., 2019) failed to calculate for the following reasons:    Risk score cannot be calculated because patient has a medical history suggesting prior/existing ASCVD    {Link to Fracture Risk Assessment Tool (Optional):960254}    {Provider  REQUIRED FOR AWV Use the storyboard to review patient history, after sections have been marked as reviewed, refresh note to capture documentation:520973}   Reviewed and updated as needed this visit by Provider                    {HISTORY OPTIONS (Optional):148836}    {ROS Picklists (Optional):898605}     Objective    Exam  BP (!) 158/77 (BP Location: Right arm, Patient Position: Sitting, Cuff  "Size: Adult Large)   Pulse 61   Temp 97.7  F (36.5  C) (Oral)   Resp 18   Ht 1.81 m (5' 11.25\")   Wt 108.3 kg (238 lb 12.8 oz)   SpO2 94%   BMI 33.07 kg/m     Estimated body mass index is 33.07 kg/m  as calculated from the following:    Height as of this encounter: 1.81 m (5' 11.25\").    Weight as of this encounter: 108.3 kg (238 lb 12.8 oz).    Physical Exam  {Exam Choices (Optional):339625}        Signed Electronically by: Johann Serna DO  {Email feedback regarding this note to [email or web address]   :994551}  "

## 2025-07-08 NOTE — PROGRESS NOTES
[facility] ENDOCRINOLOGY    Diabetes Note 7/8/2025    Crispin Rojas, 1960, [id number]          Reason for visit      1. Type 2 diabetes mellitus with peripheral neuropathy (H)        HPI     Crispin Rojas is a very pleasant 65 year old old male who presents for follow up.  SUMMARY:    Judie is seen today in follow-up for DM 2. His current A1c is 7.4 and similar to his previous of 7.3. he is currently using the [employer] Freestyle CGM, which was downloaded and data was reviewed.     TIR was 67%. Above, 7% and below, 26%. GMI of 5.7% 2/2 his hypoglycemia.     Pt was unaware that he was having this much in the way of hypoglycemia. Informed that we hope for <4%. He does vary his Tresiba dose, however because of the nature of this insulin, small changes in dosing will likely not impact his day to day. He is working at the [employer] right now and moving a lot, as well as moving heavy things a lot. He is regularly having lows mid day. He can vary his lunch times and because of his breakfast/morning snack choices, in addition to all of the movement, this contributes to lows. He is inconsistent with his protein in the morning and often only eats CHO. Strongly urged to have protein regularly.     Also noted today, that he is hypertensive, and apparently this has been going on for a bit. As we are sitting talking, he realizes that he has been eating pretzels daily and thinks that maybe its the added salt that has caused the increase.         Blood glucose data:      Past Medical History     Patient Active Problem List   Diagnosis    Calculus of kidney    Chronic left shoulder pain    Type 2 diabetes mellitus with peripheral neuropathy (H)    Hyperlipidemia LDL goal <70    Essential hypertension    Right bundle branch block    GILA (obstructive sleep apnea)    PVD (peripheral vascular disease)    Scoliosis of thoracic spine, unspecified scoliosis type    Other sequelae following unspecified cerebrovascular disease     Cervical pain    Bilateral thoracic back pain    Osteomyelitis of great toe of right foot (H)    Acute deep vein thrombosis (DVT) of tibial vein of right lower extremity (H)    Abnormal CT of liver    Benign neoplasm of transverse colon    Duodenitis    Hemorrhoids    Polyp of colon    Acute osteomyelitis of right foot (H)    Essential tremor    Nonalcoholic steatohepatitis    Otalgia of left ear    Shoulder pain    Liver cirrhosis secondary to SNYDER (H)    Gastro-esophageal reflux disease with esophagitis    Chronic osteomyelitis of right foot (H)    Chronic foot ulcer with fat layer exposed, left (H)    Class 2 severe obesity due to excess calories with serious comorbidity in adult (H)    Osteomyelitis of left foot, unspecified type (H)    Cellulitis of right upper extremity    Dog bite of hand, right, initial encounter    Cholecystitis    RUQ abdominal pain        Family History       family history includes Arthritis in his mother; Cancer in his mother; Cerebrovascular Disease in his mother; Chronic Obstructive Pulmonary Disease in his father; Connective Tissue Disorder in his mother; Coronary Artery Disease in his father; Diabetes in his father, maternal grandfather, mother, and sister.    Social History      reports that he has never smoked. He has never been exposed to tobacco smoke. He has never used smokeless tobacco. He reports current alcohol use. He reports that he does not use drugs.      Review of Systems     Patient has no polyuria or polydipsia, no chest pain, dyspnea or TIA's, no numbness, tingling or pain in extremities  Remainder negative except as noted in HPI.    Vital Signs     BP (!) 170/60 (BP Location: Right arm, Patient Position: Sitting, Cuff Size: Adult Large)   Pulse 60   Wt 109.9 kg (242 lb 4.8 oz)   SpO2 95%   BMI 33.56 kg/m    Wt Readings from Last 3 Encounters:   07/08/25 109.9 kg (242 lb 4.8 oz)   07/08/25 108.3 kg (238 lb 12.8 oz)   06/06/25 114.7 kg (252 lb 14.4 oz)  "      Physical Exam     Constitutional:  Well developed, Well nourished  HENT:  Normocephalic,   Neck: Thyroid normal, No lymph nodes, Supple  Eyes:  PERRL, Conjunctiva pink  Respiratory:  Normal breath sounds, No respiratory distress  Cardiovascular:  Normal heart rate, Normal rhythm, No murmurs  GI:  Bowel sounds normal, Soft, No tenderness  Musculoskeletal:  No gross deformity or lesions, normal dorsalis pedis pulses  Skin: No acanthosis nigricans, lipoatrophy or lipodystrophy  Neurologic:  Alert & oriented x 3, nonfocal  Psychiatric:  Affect, Mood, Insight appropriate  Diabetic foot exam: no ulcers, charcot's or high risk calluses,    Assessment     1. Type 2 diabetes mellitus with peripheral neuropathy (H)        Plan     Will have him reduce his Tresiba dose to 35 units daily and keep it there. He is going to to stop with the Pretzels too. Follow-up with me in September.       Shira Montanez NP  [facility]  7/8/2025  4:36 PM      Lab Results     No results found for: \"HGBA1C\", \"CREATININE\", \"MICROALBUR\"    Cholesterol   Date Value Ref Range Status   01/06/2025 97 <200 mg/dL Final   06/22/2021 95 <200 mg/dL Final     HDL Cholesterol   Date Value Ref Range Status   06/22/2021 42 >39 mg/dL Final     Direct Measure HDL   Date Value Ref Range Status   01/06/2025 35 (L) >=40 mg/dL Final     Triglycerides   Date Value Ref Range Status   01/06/2025 81 <150 mg/dL Final   06/22/2021 42 <150 mg/dL Final     Comment:     Fasting specimen       [unfilled]      Current Medications     Outpatient Medications Prior to Visit   Medication Sig Dispense Refill    amLODIPine (NORVASC) 5 MG tablet Take 2 tablets (10 mg) by mouth daily. 90 tablet 4    atorvastatin (LIPITOR) 40 MG tablet Take 40 mg by mouth at bedtime      calcium carbonate (OS-NARA) 500 MG tablet Take 1 tablet by mouth 2 times daily      Cholecalciferol (VITAMIN D3) 50 MCG (2000 UT) CAPS Take 1 capsule by mouth every morning.      Co-Enzyme Q10 100 MG CAPS Take " "100 mg by mouth daily       Continuous Glucose Sensor (FREESTYLE LUCERO 14 DAY SENSOR) MISC 1 each every 14 days. every 14 days. 6 each 0    ELIQUIS ANTICOAGULANT 5 MG tablet Take 1 tablet (5 mg) by mouth 2 times daily. 180 tablet 4    ferrous sulfate (FEROSUL) 325 (65 Fe) MG tablet Take 325 mg by mouth daily (with breakfast)      gabapentin (NEURONTIN) 100 MG capsule Take 400 mg by mouth 3 times daily      gentamicin (GARAMYCIN) 0.1 % external ointment Apply topically as needed      hydrochlorothiazide (HYDRODIURIL) 25 MG tablet Take 1 tablet (25 mg) by mouth daily. 90 tablet 3    Insulin Aspart FlexPen 100 UNIT/ML SOPN Inject 70 Units subcutaneously daily with food. 1:7 CHO 60 mL 0    insulin degludec (TRESIBA FLEXTOUCH) 100 UNIT/ML pen ADMINISTER 50 TO 55 UNITS UNDER THE SKIN DAILY 45 mL 0    ketoconazole (NIZORAL) 2 % external shampoo Apply topically twice a week On Tuesday and Friday      lisinopril (ZESTRIL) 40 MG tablet Take 1 tablet (40 mg) by mouth daily. 90 tablet 4    magnesium gluconate (MAGONATE) 500 MG tablet Take 500 mg by mouth 3 times daily      metFORMIN (GLUCOPHAGE XR) 500 MG 24 hr tablet TAKE 2 TABLETS(1000 MG) BY MOUTH TWICE DAILY 360 tablet 0    Multiple Vitamins-Minerals (MULTIVITAMIN PO) Take 1 tablet by mouth daily       Needle, Disp, (B-D DISP NEEDLE) 25G X 1\" MISC 1 each every 14 days. 100 each 0    omeprazole (PRILOSEC) 40 MG DR capsule Take 40 mg by mouth daily      OZEMPIC, 2 MG/DOSE, 8 MG/3ML pen INJECT 2 MG UNDER THE SKIN EVERY 7 DAYS 9 mL 0    propranolol (INDERAL) 20 MG tablet Take 20 mg by mouth 2 times daily      Syringe/Needle, Disp, (BD LUER-JOCELINE SYRINGE) 25G X 1-1/2\" 3 ML MISC Use one every 14 days as directed. 100 each 0    tadalafil (CIALIS) 5 MG tablet TAKE 1 TABLET BY MOUTH EVERY DAY AS NEEDED one hour before intercourse 30 tablet 1    testosterone cypionate (DEPOTESTOSTERONE) 100 MG/ML injection Inject 0.5 mLs (50 mg) into the muscle every 14 days. 10 mL 1     No " facility-administered medications prior to visit.

## 2025-07-08 NOTE — PATIENT INSTRUCTIONS
Patient Education   Preventive Care Advice   This is general advice given by our system to help you stay healthy. However, your care team may have specific advice just for you. Please talk to your care team about your preventive care needs.  Nutrition  Eat 5 or more servings of fruits and vegetables each day.  Try wheat bread, brown rice and whole grain pasta (instead of white bread, rice, and pasta).  Get enough calcium and vitamin D. Check the label on foods and aim for 100% of the RDA (recommended daily allowance).  Lifestyle  Exercise at least 150 minutes each week  (30 minutes a day, 5 days a week).  Do muscle strengthening activities 2 days a week. These help control your weight and prevent disease.  No smoking.  Wear sunscreen to prevent skin cancer.  Have a dental exam and cleaning every 6 months.  Yearly exams  See your health care team every year to talk about:  Any changes in your health.  Any medicines your care team has prescribed.  Preventive care, family planning, and ways to prevent chronic diseases.  Shots (vaccines)   HPV shots (up to age 26), if you've never had them before.  Hepatitis B shots (up to age 59), if you've never had them before.  COVID-19 shot: Get this shot when it's due.  Flu shot: Get a flu shot every year.  Tetanus shot: Get a tetanus shot every 10 years.  Pneumococcal, hepatitis A, and RSV shots: Ask your care team if you need these based on your risk.  Shingles shot (for age 50 and up)  General health tests  Diabetes screening:  Starting at age 35, Get screened for diabetes at least every 3 years.  If you are younger than age 35, ask your care team if you should be screened for diabetes.  Cholesterol test: At age 39, start having a cholesterol test every 5 years, or more often if advised.  Bone density scan (DEXA): At age 50, ask your care team if you should have this scan for osteoporosis (brittle bones).  Hepatitis C: Get tested at least once in your life.  STIs (sexually  transmitted infections)  Before age 24: Ask your care team if you should be screened for STIs.  After age 24: Get screened for STIs if you're at risk. You are at risk for STIs (including HIV) if:  You are sexually active with more than one person.  You don't use condoms every time.  You or a partner was diagnosed with a sexually transmitted infection.  If you are at risk for HIV, ask about PrEP medicine to prevent HIV.  Get tested for HIV at least once in your life, whether you are at risk for HIV or not.  Cancer screening tests  Cervical cancer screening: If you have a cervix, begin getting regular cervical cancer screening tests starting at age 21.  Breast cancer scan (mammogram): If you've ever had breasts, begin having regular mammograms starting at age 40. This is a scan to check for breast cancer.  Colon cancer screening: It is important to start screening for colon cancer at age 45.  Have a colonoscopy test every 10 years (or more often if you're at risk) Or, ask your provider about stool tests like a FIT test every year or Cologuard test every 3 years.  To learn more about your testing options, visit:   .  For help making a decision, visit:   https://bit.ly/sk81091.  Prostate cancer screening test: If you have a prostate, ask your care team if a prostate cancer screening test (PSA) at age 55 is right for you.  Lung cancer screening: If you are a current or former smoker ages 50 to 80, ask your care team if ongoing lung cancer screenings are right for you.  For informational purposes only. Not to replace the advice of your health care provider. Copyright   2023 [facility]. All rights reserved. Clinically reviewed by the [facility]. [employer] Group [id number] - REV 01/24.  Preventing Falls: Care Instructions  Injuries and health problems such as trouble walking or poor eyesight can increase your risk of falling. So can some medicines. But there are things you can do to help  "prevent falls. You can exercise to get stronger. You can also arrange your home to make it safer.    Talk to your doctor about the medicines you take. Ask if any of them increase the risk of falls and whether they can be changed or stopped.   Try to exercise regularly. It can help improve your strength and balance. This can help lower your risk of falling.         Practice fall safety and prevention.   Wear low-heeled shoes that fit well and give your feet good support. Talk to your doctor if you have foot problems that make this hard.  Carry a cellphone or wear a medical alert device that you can use to call for help.  Use stepladders instead of chairs to reach high objects. Don't climb if you're at risk for falls. Ask for help, if needed.  Wear the correct eyeglasses, if you need them.        Make your home safer.   Remove rugs, cords, clutter, and furniture from walkways.  Keep your house well lit. Use night-lights in hallways and bathrooms.  Install and use sturdy handrails on stairways.  Wear nonskid footwear, even inside. Don't walk barefoot or in socks without shoes.        Be safe outside.   Use handrails, curb cuts, and ramps whenever possible.  Keep your hands free by using a shoulder bag or backpack.  Try to walk in well-lit areas. Watch out for uneven ground, changes in pavement, and debris.  Be careful in the winter. Walk on the grass or gravel when sidewalks are slippery. Use de-icer on steps and walkways. Add non-slip devices to shoes.    Put grab bars and nonskid mats in your shower or tub and near the toilet. Try to use a shower chair or bath bench when bathing.   Get into a tub or shower by putting in your weaker leg first. Get out with your strong side first. Have a phone or medical alert device in the bathroom with you.   Where can you learn more?  Go to https://www.[employer].net/patiented  Enter G117 in the search box to learn more about \"Preventing Falls: Care Instructions.\"  Current as of: " July 31, 2024  Content Version: 14.5    2594-3144 [employer].   Care instructions adapted under license by your healthcare professional. If you have questions about a medical condition or this instruction, always ask your healthcare professional. [employer] disclaims any warranty or liability for your use of this information.

## 2025-07-13 PROBLEM — E08.22: Status: ACTIVE | Noted: 2025-07-13

## 2025-07-13 PROBLEM — N18.30: Status: ACTIVE | Noted: 2025-07-13

## 2025-07-20 DIAGNOSIS — E11.42 TYPE 2 DIABETES MELLITUS WITH PERIPHERAL NEUROPATHY (H): ICD-10-CM

## 2025-07-21 RX ORDER — INSULIN ASPART 100 [IU]/ML
INJECTION, SOLUTION INTRAVENOUS; SUBCUTANEOUS
Qty: 60 ML | Refills: 0 | Status: SHIPPED | OUTPATIENT
Start: 2025-07-21

## 2025-07-21 NOTE — TELEPHONE ENCOUNTER
Requested Prescriptions   Pending Prescriptions Disp Refills    NOVOLOG FLEXPEN 100 UNIT/ML soln [Pharmacy Med Name: NOVOLOG FLEXPEN INJ 3ML (ORANGE)] 60 mL 0     Sig: INJECT 70 UNITS UNDER THE SKIN DAILY WITH FOOD 1:7 CHO       Insulin Protocol Failed - 7/21/2025 12:30 PM        Failed - Medication is active on med list and the sig matches. RN to manually verify dose and sig if red X/fail.     If the protocol passes (green check), you do not need to verify med dose and sig.    A prescription matches if they are the same clinical intention.    For Example: once daily and every morning are the same.    The protocol can not identify upper and lower case letters as matching and will fail.     For Example: Take 1 tablet (50 mg) by mouth daily     TAKE 1 TABLET (50 MG) BY MOUTH DAILY    For all fails (red x), verify dose and sig.    If the refill does match what is on file, the RN can still proceed to approve the refill request.       If they do not match, route to the appropriate provider.             Failed - Chart review required     Review Chart.    Do not approve if insulin is used in a pump.  Instead, direct refill request to the patient's endocrinologist.  If the patient doesn't have an endocrinologist, then send the refill to the patient's PCP for review            Passed - HgbA1C in past 3 or 6 months     If HgbA1C is 8 or greater, it needs to be on file within the past 3 months.  If less than 8, must be on file within the past 6 months.     Recent Labs   Lab Test 05/29/25  1303   A1C 7.4*             Passed - Recent (6 month) or future (90 days) visit with the authorizing provider's specialty (provided they have been seen in the past 9 months)     The patient must have completed an in-person or virtual visit within the past 6 months or has a future visit scheduled within the next 90 days with the authorizing provider s specialty.  Urgent care and e-visits do not quality as an office visit for this  protocol.          Passed - Medication indicated for associated diagnosis     Medication is associated with one or more of the following diagnoses:   - Type 1 diabetes mellitus  - Type 2 diabetes mellitus  - Diabetic nephropathy; Prophylaxis  - Neuropathy due to diabetes mellitus; Prophylaxis  - Retinopathy due to diabetes mellitus; Prophylaxis  - Diabetes mellitus associated with cystic fibrosis  - Disorder of cardiovascular system; Prophylaxis - Type 1 diabetes mellitus   - Disorder of cardiovascular system; Prophylaxis - Type 2 diabetes mellitus            Passed - Patient is 18 years of age or older

## 2025-07-25 DIAGNOSIS — E11.42 TYPE 2 DIABETES MELLITUS WITH PERIPHERAL NEUROPATHY (H): ICD-10-CM

## 2025-07-28 RX ORDER — METFORMIN HYDROCHLORIDE 500 MG/1
1000 TABLET, EXTENDED RELEASE ORAL 2 TIMES DAILY
Qty: 240 TABLET | Refills: 0 | Status: SHIPPED | OUTPATIENT
Start: 2025-07-28

## 2025-07-28 NOTE — TELEPHONE ENCOUNTER
Requested Prescriptions   Pending Prescriptions Disp Refills    metFORMIN (GLUCOPHAGE XR) 500 MG 24 hr tablet [Pharmacy Med Name: METFORMIN ER 500MG 24HR TABS] 360 tablet 0     Sig: TAKE 2 TABLETS(1000 MG) BY MOUTH TWICE DAILY       Biguanide Agents Passed - 7/28/2025  1:13 PM        Passed - Patient is age 10 or older        Passed - Patient has documented A1c within the specified period of time.     If HgbA1C is 8 or greater, it needs to be on file within the past 3 months.  If less than 8, must be on file within the past 6 months.     Recent Labs   Lab Test 05/29/25  1303   A1C 7.4*             Passed - Patient does NOT have a diagnosis of CHF.        Passed - Medication is active on med list and the sig matches. RN to manually verify dose and sig if red X/fail.     If the protocol passes (green check), you do not need to verify med dose and sig.    A prescription matches if they are the same clinical intention.    For Example: once daily and every morning are the same.    The protocol can not identify upper and lower case letters as matching and will fail.     For Example: Take 1 tablet (50 mg) by mouth daily     TAKE 1 TABLET (50 MG) BY MOUTH DAILY    For all fails (red x), verify dose and sig.    If the refill does match what is on file, the RN can still proceed to approve the refill request.       If they do not match, route to the appropriate provider.             Passed - Medication indicated for associated diagnosis     Medication is associated with one or more of the following diagnoses:     Gestational diabetes mellitus     Hyperinsulinar obesity     Hypersecretion of ovarian androgens    Non-alcoholic fatty liver    Polycystic ovarian syndrome               Pre-diabetes (DM 2 prevention)    Type 2 diabetes mellitus     Weight gain, antipsychotic therapy-induced    Impaired fasting glucose          Passed - Has GFR on file in past 12 months and most recent value is normal        Passed - Recent (6 month)  or future (90 days) visit with the authorizing provider's specialty (provided they have been seen in the past 9 months)     The patient must have completed an in-person or virtual visit within the past 6 months or has a future visit scheduled within the next 90 days with the authorizing provider s specialty.  Urgent care and e-visits do not quality as an office visit for this protocol.

## 2025-07-30 DIAGNOSIS — E11.42 TYPE 2 DIABETES MELLITUS WITH PERIPHERAL NEUROPATHY (H): ICD-10-CM

## 2025-07-30 NOTE — TELEPHONE ENCOUNTER
Refill Request  Medication name: Pending Prescriptions:                       Disp   Refills    Semaglutide (2 MG/DOSE) (OZEMPIC (2 MG/DO*9 mL   0          Requested Pharmacy:     [id number] [id number] [facility] AT [place] [address] & [facility]    PATIENT IS REQUESTING A 90 DAY SUPPLY

## 2025-07-31 RX ORDER — SEMAGLUTIDE 2.68 MG/ML
2 INJECTION, SOLUTION SUBCUTANEOUS
Qty: 9 ML | Refills: 0 | Status: SHIPPED | OUTPATIENT
Start: 2025-07-31

## 2025-07-31 NOTE — TELEPHONE ENCOUNTER
Requested Prescriptions   Pending Prescriptions Disp Refills    Semaglutide (2 MG/DOSE) (OZEMPIC (2 MG/DOSE)) 8 MG/3ML pen 9 mL 0       GLP-1 Agonists Protocol Failed - 7/31/2025  1:15 PM        Failed - Medication is active on med list and the sig matches. RN to manually verify dose and sig if red X/fail.     If the protocol passes (green check), you do not need to verify med dose and sig.    A prescription matches if they are the same clinical intention.    For Example: once daily and every morning are the same.    The protocol can not identify upper and lower case letters as matching and will fail.     For Example: Take 1 tablet (50 mg) by mouth daily     TAKE 1 TABLET (50 MG) BY MOUTH DAILY    For all fails (red x), verify dose and sig.    If the refill does match what is on file, the RN can still proceed to approve the refill request.       If they do not match, route to the appropriate provider.             Passed - HgbA1C in past 3 or 6 months     If HgbA1C is 8 or greater, it needs to be on file within the past 3 months.  If less than 8, must be on file within the past 6 months.     Recent Labs   Lab Test 05/29/25  1303   A1C 7.4*             Passed - Has GFR on file in past 12 months and most recent value is normal     Recent Labs   Lab Test 07/08/25  0823 09/14/21  1421 05/10/21  1555   GFRESTIMATED 80   < > >90   GFRESTBLACK  --   --  >90    < > = values in this interval not displayed.             Passed - Recent (6 month) or future (90 days) visit with the authorizing provider's specialty (provided they have been seen in the past 9 months)     The patient must have completed an in-person or virtual visit within the past 6 months or has a future visit scheduled within the next 90 days with the authorizing provider s specialty.  Urgent care and e-visits do not quality as an office visit for this protocol.          Passed - Medication indicated for associated diagnosis     Medication is associated with one  or more of the following diagnoses:     Type 2 diabetes mellitus           Passed - Patient is age 18 or older

## 2025-08-10 DIAGNOSIS — B35.0 TINEA CAPITIS DUE TO TRICHOPHYTON: Primary | ICD-10-CM

## 2025-08-11 RX ORDER — KETOCONAZOLE 20 MG/ML
SHAMPOO, SUSPENSION TOPICAL
Qty: 120 ML | Refills: 3 | Status: SHIPPED | OUTPATIENT
Start: 2025-08-11

## 2025-08-18 ENCOUNTER — THERAPY VISIT (OUTPATIENT)
Dept: PHYSICAL THERAPY | Facility: CLINIC | Age: 65
End: 2025-08-18
Attending: FAMILY MEDICINE
Payer: COMMERCIAL

## 2025-08-18 DIAGNOSIS — R25.2 MUSCLE CRAMPING: Primary | ICD-10-CM

## 2025-08-18 PROCEDURE — 97161 PT EVAL LOW COMPLEX 20 MIN: CPT | Mod: GP | Performed by: PHYSICAL THERAPIST

## 2025-08-18 PROCEDURE — 97110 THERAPEUTIC EXERCISES: CPT | Mod: GP | Performed by: PHYSICAL THERAPIST

## 2025-08-18 ASSESSMENT — ACTIVITIES OF DAILY LIVING (ADL)
GIVING WAY, BUCKLING OR SHIFTING OF KNEE: I DO NOT HAVE THE SYMPTOM
STIFFNESS: I HAVE THE SYMPTOM BUT IT DOES NOT AFFECT MY ACTIVITY
HOW_WOULD_YOU_RATE_THE_CURRENT_FUNCTION_OF_YOUR_KNEE_DURING_YOUR_USUAL_DAILY_ACTIVITIES_ON_A_SCALE_FROM_0_TO_100_WITH_100_BEING_YOUR_LEVEL_OF_KNEE_FUNCTION_PRIOR_TO_YOUR_INJURY_AND_0_BEING_THE_INABILITY_TO_PERFORM_ANY_OF_YOUR_USUAL_DAILY_ACTIVITIES?: 100
WALK: ACTIVITY IS NOT DIFFICULT
KNEEL ON THE FRONT OF YOUR KNEE: ACTIVITY IS SOMEWHAT DIFFICULT
STIFFNESS: I HAVE THE SYMPTOM BUT IT DOES NOT AFFECT MY ACTIVITY
SIT WITH YOUR KNEE BENT: ACTIVITY IS NOT DIFFICULT
KNEE_ACTIVITY_OF_DAILY_LIVING_SCORE: 88.57
PLEASE_INDICATE_YOR_PRIMARY_REASON_FOR_REFERRAL_TO_THERAPY:: KNEE
SIT WITH YOUR KNEE BENT: ACTIVITY IS NOT DIFFICULT
STAND: ACTIVITY IS NOT DIFFICULT
SQUAT: ACTIVITY IS MINIMALLY DIFFICULT
SWELLING: I DO NOT HAVE THE SYMPTOM
GO DOWN STAIRS: ACTIVITY IS NOT DIFFICULT
WALK: ACTIVITY IS NOT DIFFICULT
RAW_SCORE: 62
LIMPING: I HAVE THE SYMPTOM BUT IT DOES NOT AFFECT MY ACTIVITY
KNEE_ACTIVITY_OF_DAILY_LIVING_SUM: 62
HOW_WOULD_YOU_RATE_THE_CURRENT_FUNCTION_OF_YOUR_KNEE_DURING_YOUR_USUAL_DAILY_ACTIVITIES_ON_A_SCALE_FROM_0_TO_100_WITH_100_BEING_YOUR_LEVEL_OF_KNEE_FUNCTION_PRIOR_TO_YOUR_INJURY_AND_0_BEING_THE_INABILITY_TO_PERFORM_ANY_OF_YOUR_USUAL_DAILY_ACTIVITIES?: 100
GO UP STAIRS: ACTIVITY IS NOT DIFFICULT
KNEEL ON THE FRONT OF YOUR KNEE: ACTIVITY IS SOMEWHAT DIFFICULT
AS_A_RESULT_OF_YOUR_KNEE_INJURY,_HOW_WOULD_YOU_RATE_YOUR_CURRENT_LEVEL_OF_DAILY_ACTIVITY?: NORMAL
WEAKNESS: I HAVE THE SYMPTOM BUT IT DOES NOT AFFECT MY ACTIVITY
PAIN: THE SYMPTOM AFFECTS MY ACTIVITY SLIGHTLY
AS_A_RESULT_OF_YOUR_KNEE_INJURY,_HOW_WOULD_YOU_RATE_YOUR_CURRENT_LEVEL_OF_DAILY_ACTIVITY?: NORMAL
HOW_WOULD_YOU_RATE_THE_OVERALL_FUNCTION_OF_YOUR_KNEE_DURING_YOUR_USUAL_DAILY_ACTIVITIES?: NORMAL
PAIN: THE SYMPTOM AFFECTS MY ACTIVITY SLIGHTLY
LIMPING: I HAVE THE SYMPTOM BUT IT DOES NOT AFFECT MY ACTIVITY
STAND: ACTIVITY IS NOT DIFFICULT
SQUAT: ACTIVITY IS MINIMALLY DIFFICULT
RISE FROM A CHAIR: ACTIVITY IS NOT DIFFICULT
GO UP STAIRS: ACTIVITY IS NOT DIFFICULT
SWELLING: I DO NOT HAVE THE SYMPTOM
HOW_WOULD_YOU_RATE_THE_OVERALL_FUNCTION_OF_YOUR_KNEE_DURING_YOUR_USUAL_DAILY_ACTIVITIES?: NORMAL
WEAKNESS: I HAVE THE SYMPTOM BUT IT DOES NOT AFFECT MY ACTIVITY
GO DOWN STAIRS: ACTIVITY IS NOT DIFFICULT
RISE FROM A CHAIR: ACTIVITY IS NOT DIFFICULT
GIVING WAY, BUCKLING OR SHIFTING OF KNEE: I DO NOT HAVE THE SYMPTOM

## 2025-08-25 ENCOUNTER — THERAPY VISIT (OUTPATIENT)
Dept: PHYSICAL THERAPY | Facility: CLINIC | Age: 65
End: 2025-08-25
Attending: FAMILY MEDICINE
Payer: COMMERCIAL

## 2025-08-25 DIAGNOSIS — R25.2 MUSCLE CRAMPING: Primary | ICD-10-CM

## 2025-08-25 PROCEDURE — 97110 THERAPEUTIC EXERCISES: CPT | Mod: GP | Performed by: PHYSICAL THERAPIST

## 2025-08-26 ENCOUNTER — OFFICE VISIT (OUTPATIENT)
Dept: OTOLARYNGOLOGY | Facility: CLINIC | Age: 65
End: 2025-08-26
Payer: COMMERCIAL

## 2025-08-26 VITALS
HEIGHT: 71 IN | TEMPERATURE: 97.5 F | BODY MASS INDEX: 32.58 KG/M2 | WEIGHT: 232.7 LBS | DIASTOLIC BLOOD PRESSURE: 72 MMHG | SYSTOLIC BLOOD PRESSURE: 124 MMHG

## 2025-08-26 DIAGNOSIS — J34.89 NASAL LESION: Primary | ICD-10-CM

## 2025-09-04 ENCOUNTER — THERAPY VISIT (OUTPATIENT)
Dept: PHYSICAL THERAPY | Facility: CLINIC | Age: 65
End: 2025-09-04
Attending: FAMILY MEDICINE
Payer: COMMERCIAL

## 2025-09-04 ENCOUNTER — LAB (OUTPATIENT)
Dept: LAB | Facility: CLINIC | Age: 65
End: 2025-09-04
Payer: COMMERCIAL

## 2025-09-04 DIAGNOSIS — E08.22 DIABETES MELLITUS DUE TO UNDERLYING CONDITION WITH STAGE 3 CHRONIC KIDNEY DISEASE, UNSPECIFIED WHETHER LONG TERM INSULIN USE, UNSPECIFIED WHETHER STAGE 3A OR 3B CKD (H): ICD-10-CM

## 2025-09-04 DIAGNOSIS — Z13.6 SCREENING FOR CARDIOVASCULAR CONDITION: Primary | ICD-10-CM

## 2025-09-04 DIAGNOSIS — R25.2 MUSCLE CRAMPING: Primary | ICD-10-CM

## 2025-09-04 DIAGNOSIS — E11.42 TYPE 2 DIABETES MELLITUS WITH PERIPHERAL NEUROPATHY (H): ICD-10-CM

## 2025-09-04 DIAGNOSIS — N18.30 DIABETES MELLITUS DUE TO UNDERLYING CONDITION WITH STAGE 3 CHRONIC KIDNEY DISEASE, UNSPECIFIED WHETHER LONG TERM INSULIN USE, UNSPECIFIED WHETHER STAGE 3A OR 3B CKD (H): ICD-10-CM

## 2025-09-04 LAB
EST. AVERAGE GLUCOSE BLD GHB EST-MCNC: 160 MG/DL
HBA1C MFR BLD: 7.2 % (ref 0–5.6)

## (undated) DEVICE — ESU GROUND PAD ADULT W/CORD E7507

## (undated) DEVICE — CAST PADDING 4" UNSTERILE 9044

## (undated) DEVICE — GLOVE BIOGEL PI MICRO INDICATOR UNDERGLOVE SZ 7.0 48970

## (undated) DEVICE — BAG CLEAR TRASH 1.3M 39X33" P4040C

## (undated) DEVICE — SU ETHILON 2-0 PS 18" 585H

## (undated) DEVICE — ESU GROUND PAD UNIVERSAL W/O CORD

## (undated) DEVICE — LINEN ORTHO ACL PACK 5447

## (undated) DEVICE — DRSG GAUZE 4X4" 8044

## (undated) DEVICE — SU PROLENE 3-0 PS-2 18" 8687H

## (undated) DEVICE — SUCTION MANIFOLD NEPTUNE 2 SYS 4 PORT 0702-020-000

## (undated) DEVICE — CLIP APPLIER ENDO 5MM M/L LIGAMAX EL5ML

## (undated) DEVICE — GLOVE PROTEXIS BLUE W/NEU-THERA 7.0  2D73EB70

## (undated) DEVICE — LINEN HALF SHEET 5512

## (undated) DEVICE — DRSG ABDOMINAL 07 1/2X8" 7197D

## (undated) DEVICE — DRAIN JACKSON PRATT 15FR ROUND SIL LF JP-2229

## (undated) DEVICE — DECANTER VIAL 2006S

## (undated) DEVICE — GLOVE PROTEXIS W/NEU-THERA 6.5  2D73TE65

## (undated) DEVICE — SYR 10ML FINGER CONTROL W/O NDL 309695

## (undated) DEVICE — BNDG ELASTIC 4"X5YDS UNSTERILE 6611-40

## (undated) DEVICE — PACK HAND SOP32HARMO

## (undated) DEVICE — NDL 25GA 1.5" 305127

## (undated) DEVICE — SU VICRYL 3-0 TIE 12X18" J904T

## (undated) DEVICE — SOL NACL 0.9% IRRIG 1000ML BOTTLE 2F7124

## (undated) DEVICE — GLOVE BIOGEL PI MICRO SZ 7.5 48575

## (undated) DEVICE — BLADE SAW SAGITTAL 25.5X9.5X.4MM FINE LINVATEC 5023-138

## (undated) DEVICE — BLADE KNIFE SURG 15 371115

## (undated) DEVICE — DRAPE POUCH IRR 1016

## (undated) DEVICE — SU PROLENE 4-0 PS-2 18" 8682G

## (undated) DEVICE — SU VICRYL 4-0 PS-2 18" UND J496H

## (undated) DEVICE — SUCTION CANISTER MEDIVAC LINER 3000ML W/LID 65651-530

## (undated) DEVICE — DRSG ADAPTIC 3X8" 6113

## (undated) DEVICE — Device

## (undated) DEVICE — SU DERMABOND ADVANCED .7ML DNX12

## (undated) DEVICE — SUCTION TIP YANKAUER W/O VENT K86

## (undated) DEVICE — NDL 18GA 1.5" 305196

## (undated) DEVICE — PREP CHLORAPREP 26ML TINTED HI-LITE ORANGE 930815

## (undated) DEVICE — DRAPE C-ARM MINI 5423

## (undated) DEVICE — LINEN TOWEL PACK X5 5464

## (undated) DEVICE — CAST PADDING 4" STERILE 9044S

## (undated) DEVICE — LINEN POUCH DBL 5427

## (undated) DEVICE — LINEN FULL SHEET 5511

## (undated) DEVICE — PREP CHLORAPREP 26ML TINTED ORANGE  260815

## (undated) DEVICE — TOURNIQUET CUFF 18" REPRO RED 60-7070-103

## (undated) DEVICE — SU VICRYL 0 UR-6 27" J603H

## (undated) DEVICE — PREP POVIDONE IODINE SCRUB 7.5% 4OZ APL82212

## (undated) DEVICE — PACK EXTREMITY SOP15EXFSD

## (undated) DEVICE — PACK LOWER EXTREMITY RIDGES

## (undated) DEVICE — GLOVE BIOGEL PI SZ 6.5 40865

## (undated) DEVICE — TUBE CULTURE AEROBIC/ANAEROBIC W/O SWABS A.C.T.I.1. 12401

## (undated) DEVICE — DRAIN JACKSON PRATT RESERVOIR 100ML SU130-1305

## (undated) DEVICE — PREP SKIN SCRUB TRAY 4461A

## (undated) DEVICE — TRAY PREP DRY SKIN SCRUB 067

## (undated) DEVICE — BNDG KLING 4" 2236

## (undated) DEVICE — PREP POVIDONE IODINE SOLUTION 10% 4OZ BOTTLE 29906-004

## (undated) DEVICE — APPLICATORS COTTON TIP 6"X2 STERILE LF C15053-006

## (undated) DEVICE — PREP POVIDONE-IODINE 7.5% SCRUB 4OZ BOTTLE MDS093945

## (undated) DEVICE — ENDO TROCAR SLEEVE KII Z-THREADED 05X100MM CTS02

## (undated) DEVICE — APPLICATOR ENDOSCOPIC 5 SURGICEL POWDER 3123SPEA

## (undated) DEVICE — GOWN IMPERVIOUS SPECIALTY XLG/XLONG 32474

## (undated) DEVICE — GLOVE PROTEXIS BLUE W/NEU-THERA 6.5  2D73EB65

## (undated) DEVICE — DRSG GAUZE 4X4" TRAY

## (undated) DEVICE — SYR BULB IRRIG DOVER 60 ML LATEX FREE 67000

## (undated) DEVICE — SU VICRYL 3-0 PS-2 18" UND J497H

## (undated) DEVICE — CAST BUCKET

## (undated) DEVICE — BLADE SAW SAGITTAL 18.5X5.5X.4MM LINVATEC 5023-144

## (undated) DEVICE — TOURNIQUET SGL BLADDER 18"X4" RED 5921-218-135

## (undated) DEVICE — TUBE CULTURE ANAEROBIC PORT-A-CUL 11ML

## (undated) DEVICE — DRSG KERLIX 4 1/2"X4YDS ROLL 6730

## (undated) DEVICE — GLOVE PROTEXIS W/NEU-THERA 8.0  2D73TE80

## (undated) DEVICE — ENDO POUCH UNIV RETRIEVAL SYSTEM INZII 10MM CD001

## (undated) DEVICE — NDL 27GA 1.25" 305136

## (undated) DEVICE — PACKING NUGAUZE 1/4" PLAIN 7631

## (undated) DEVICE — BLADE KNIFE SURG 11 371111

## (undated) DEVICE — NDL BLUNT 18GA 1" W/O FILTER 305181

## (undated) DEVICE — ENDO SPONGE ENDOSTICK KITTNER 5MM CHERRY 37002010

## (undated) DEVICE — LINEN TOWEL PACK X10 5473

## (undated) DEVICE — GLOVE PROTEXIS MICRO 8.0  2D73PM80

## (undated) DEVICE — PAD CHUX UNDERPAD 30X36" P3036C

## (undated) DEVICE — ENDO TROCAR BLUNT TIP KII BALLOON 12X100MM C0R47

## (undated) DEVICE — CAST PADDING 6" STERILE 9046S

## (undated) DEVICE — VESSEL LOOPS RED MAXI

## (undated) DEVICE — GLOVE PROTEXIS BLUE W/NEU-THERA 7.5  2D73EB75

## (undated) DEVICE — SU ETHILON 3-0 PS-2 18" 1669H

## (undated) DEVICE — GLOVE BIOGEL PI MICRO INDICATOR UNDERGLOVE SZ 8.0 48980

## (undated) DEVICE — BNDG ELASTIC 4"X5YDS STERILE 6611-4S

## (undated) DEVICE — ENDO TROCAR FIRST ENTRY KII FIOS ADV FIX 05X100MM CFF03

## (undated) DEVICE — SURGICEL POWDER ABSORBABLE HEMOSTAT 3GM 3013SP

## (undated) DEVICE — GLOVE PROTEXIS POWDER FREE 7.5 ORTHOPEDIC 2D73ET75

## (undated) DEVICE — NDL BX BONE MARROW 11GA 4"

## (undated) DEVICE — DRSG KERLIX FLUFFS X5

## (undated) DEVICE — APPLICATOR COTTON TIP 6"X2 STERILE LF 6012

## (undated) DEVICE — MANIFOLD NEPTUNE 4 PORT 700-20

## (undated) DEVICE — SU PROLENE 2-0 FS 18" 8685H

## (undated) DEVICE — SPONGE LAP 18X18" X8435

## (undated) DEVICE — ESU CORD MONOPOLAR 10'  E0510

## (undated) DEVICE — TUBING SUCTION 12"X1/4" N612

## (undated) DEVICE — PREP POVIDONE IODINE SOLUTION 10% 4OZ

## (undated) DEVICE — SU PROLENE 4-0 FS-2 18' 8683G

## (undated) DEVICE — DRAIN PENROSE 0.25"X12" LATEX FREE GR101

## (undated) DEVICE — GLOVE PROTEXIS POWDER FREE SMT 8.0  2D72PT80X

## (undated) DEVICE — SU ETHILON 4-0 PS-2 18" BLACK 1667H

## (undated) DEVICE — BLADE CLIPPER 3M 9670

## (undated) DEVICE — GLOVE PROTEXIS POWDER FREE 6.5 ORTHOPEDIC 2D73ET65

## (undated) DEVICE — SOL WATER IRRIG 1000ML BOTTLE 2F7114

## (undated) DEVICE — PAD CHUX UNDERPAD 23X24" 7136

## (undated) DEVICE — NDL 25GA 5/8" 305122

## (undated) DEVICE — BNDG KLING 3" 2232

## (undated) RX ORDER — ONDANSETRON 2 MG/ML
INJECTION INTRAMUSCULAR; INTRAVENOUS
Status: DISPENSED
Start: 2022-07-24

## (undated) RX ORDER — PROPOFOL 10 MG/ML
INJECTION, EMULSION INTRAVENOUS
Status: DISPENSED
Start: 2022-07-24

## (undated) RX ORDER — ONDANSETRON 2 MG/ML
INJECTION INTRAMUSCULAR; INTRAVENOUS
Status: DISPENSED
Start: 2022-06-05

## (undated) RX ORDER — VANCOMYCIN HYDROCHLORIDE 1 G/20ML
INJECTION, POWDER, LYOPHILIZED, FOR SOLUTION INTRAVENOUS
Status: DISPENSED
Start: 2022-07-28

## (undated) RX ORDER — PROPOFOL 10 MG/ML
INJECTION, EMULSION INTRAVENOUS
Status: DISPENSED
Start: 2022-11-30

## (undated) RX ORDER — CEFAZOLIN SODIUM/WATER 2 G/20 ML
SYRINGE (ML) INTRAVENOUS
Status: DISPENSED
Start: 2024-07-30

## (undated) RX ORDER — LIDOCAINE HYDROCHLORIDE 10 MG/ML
INJECTION, SOLUTION EPIDURAL; INFILTRATION; INTRACAUDAL; PERINEURAL
Status: DISPENSED
Start: 2022-11-30

## (undated) RX ORDER — ONDANSETRON 2 MG/ML
INJECTION INTRAMUSCULAR; INTRAVENOUS
Status: DISPENSED
Start: 2024-09-06

## (undated) RX ORDER — CEFAZOLIN SODIUM/WATER 2 G/20 ML
SYRINGE (ML) INTRAVENOUS
Status: DISPENSED
Start: 2022-11-30

## (undated) RX ORDER — PROPOFOL 10 MG/ML
INJECTION, EMULSION INTRAVENOUS
Status: DISPENSED
Start: 2024-06-18

## (undated) RX ORDER — PROPOFOL 10 MG/ML
INJECTION, EMULSION INTRAVENOUS
Status: DISPENSED
Start: 2019-08-02

## (undated) RX ORDER — LIDOCAINE HYDROCHLORIDE 10 MG/ML
INJECTION, SOLUTION EPIDURAL; INFILTRATION; INTRACAUDAL; PERINEURAL
Status: DISPENSED
Start: 2023-01-19

## (undated) RX ORDER — PROPOFOL 10 MG/ML
INJECTION, EMULSION INTRAVENOUS
Status: DISPENSED
Start: 2024-07-30

## (undated) RX ORDER — LIDOCAINE HYDROCHLORIDE 10 MG/ML
INJECTION, SOLUTION EPIDURAL; INFILTRATION; INTRACAUDAL; PERINEURAL
Status: DISPENSED
Start: 2022-06-05

## (undated) RX ORDER — FENTANYL CITRATE 50 UG/ML
INJECTION, SOLUTION INTRAMUSCULAR; INTRAVENOUS
Status: DISPENSED
Start: 2023-01-19

## (undated) RX ORDER — FENTANYL CITRATE 50 UG/ML
INJECTION, SOLUTION INTRAMUSCULAR; INTRAVENOUS
Status: DISPENSED
Start: 2022-07-24

## (undated) RX ORDER — BUPIVACAINE HYDROCHLORIDE 5 MG/ML
INJECTION, SOLUTION EPIDURAL; INTRACAUDAL
Status: DISPENSED
Start: 2023-10-19

## (undated) RX ORDER — FENTANYL CITRATE 50 UG/ML
INJECTION, SOLUTION INTRAMUSCULAR; INTRAVENOUS
Status: DISPENSED
Start: 2024-09-06

## (undated) RX ORDER — BUPIVACAINE HYDROCHLORIDE 5 MG/ML
INJECTION, SOLUTION EPIDURAL; INTRACAUDAL
Status: DISPENSED
Start: 2024-06-18

## (undated) RX ORDER — BUPIVACAINE HYDROCHLORIDE 2.5 MG/ML
INJECTION, SOLUTION EPIDURAL; INFILTRATION; INTRACAUDAL
Status: DISPENSED
Start: 2024-07-30

## (undated) RX ORDER — FENTANYL CITRATE 50 UG/ML
INJECTION, SOLUTION INTRAMUSCULAR; INTRAVENOUS
Status: DISPENSED
Start: 2024-07-30

## (undated) RX ORDER — GLYCOPYRROLATE 0.2 MG/ML
INJECTION, SOLUTION INTRAMUSCULAR; INTRAVENOUS
Status: DISPENSED
Start: 2024-09-06

## (undated) RX ORDER — ONDANSETRON 2 MG/ML
INJECTION INTRAMUSCULAR; INTRAVENOUS
Status: DISPENSED
Start: 2022-07-28

## (undated) RX ORDER — NEOSTIGMINE METHYLSULFATE 1 MG/ML
VIAL (ML) INJECTION
Status: DISPENSED
Start: 2024-09-06

## (undated) RX ORDER — LIDOCAINE HYDROCHLORIDE 10 MG/ML
INJECTION, SOLUTION EPIDURAL; INFILTRATION; INTRACAUDAL; PERINEURAL
Status: DISPENSED
Start: 2022-07-24

## (undated) RX ORDER — BUPIVACAINE HYDROCHLORIDE 5 MG/ML
INJECTION, SOLUTION EPIDURAL; INTRACAUDAL
Status: DISPENSED
Start: 2023-01-19

## (undated) RX ORDER — BUPIVACAINE HYDROCHLORIDE 5 MG/ML
INJECTION, SOLUTION EPIDURAL; INTRACAUDAL
Status: DISPENSED
Start: 2022-11-30

## (undated) RX ORDER — PROPOFOL 10 MG/ML
INJECTION, EMULSION INTRAVENOUS
Status: DISPENSED
Start: 2022-07-28

## (undated) RX ORDER — KETOROLAC TROMETHAMINE 30 MG/ML
INJECTION, SOLUTION INTRAMUSCULAR; INTRAVENOUS
Status: DISPENSED
Start: 2024-09-06

## (undated) RX ORDER — BUPIVACAINE HYDROCHLORIDE 5 MG/ML
INJECTION, SOLUTION EPIDURAL; INTRACAUDAL
Status: DISPENSED
Start: 2019-08-02

## (undated) RX ORDER — BUPIVACAINE HYDROCHLORIDE AND EPINEPHRINE 5; 5 MG/ML; UG/ML
INJECTION, SOLUTION EPIDURAL; INTRACAUDAL; PERINEURAL
Status: DISPENSED
Start: 2024-07-30

## (undated) RX ORDER — FENTANYL CITRATE 50 UG/ML
INJECTION, SOLUTION INTRAMUSCULAR; INTRAVENOUS
Status: DISPENSED
Start: 2022-06-05

## (undated) RX ORDER — CEFAZOLIN SODIUM/WATER 2 G/20 ML
SYRINGE (ML) INTRAVENOUS
Status: DISPENSED
Start: 2022-07-28

## (undated) RX ORDER — ONDANSETRON 2 MG/ML
INJECTION INTRAMUSCULAR; INTRAVENOUS
Status: DISPENSED
Start: 2023-01-19

## (undated) RX ORDER — DEXAMETHASONE SODIUM PHOSPHATE 4 MG/ML
INJECTION, SOLUTION INTRA-ARTICULAR; INTRALESIONAL; INTRAMUSCULAR; INTRAVENOUS; SOFT TISSUE
Status: DISPENSED
Start: 2022-11-30

## (undated) RX ORDER — PROPOFOL 10 MG/ML
INJECTION, EMULSION INTRAVENOUS
Status: DISPENSED
Start: 2019-11-08

## (undated) RX ORDER — BUPIVACAINE HYDROCHLORIDE 5 MG/ML
INJECTION, SOLUTION EPIDURAL; INTRACAUDAL
Status: DISPENSED
Start: 2022-07-24

## (undated) RX ORDER — FENTANYL CITRATE 50 UG/ML
INJECTION, SOLUTION INTRAMUSCULAR; INTRAVENOUS
Status: DISPENSED
Start: 2019-11-08

## (undated) RX ORDER — ONDANSETRON 2 MG/ML
INJECTION INTRAMUSCULAR; INTRAVENOUS
Status: DISPENSED
Start: 2022-11-30

## (undated) RX ORDER — LIDOCAINE HYDROCHLORIDE 10 MG/ML
INJECTION, SOLUTION EPIDURAL; INFILTRATION; INTRACAUDAL; PERINEURAL
Status: DISPENSED
Start: 2022-07-28

## (undated) RX ORDER — GLYCOPYRROLATE 0.2 MG/ML
INJECTION INTRAMUSCULAR; INTRAVENOUS
Status: DISPENSED
Start: 2022-11-30

## (undated) RX ORDER — ONDANSETRON 2 MG/ML
INJECTION INTRAMUSCULAR; INTRAVENOUS
Status: DISPENSED
Start: 2024-07-30

## (undated) RX ORDER — FENTANYL CITRATE 50 UG/ML
INJECTION, SOLUTION INTRAMUSCULAR; INTRAVENOUS
Status: DISPENSED
Start: 2019-08-02

## (undated) RX ORDER — PROPOFOL 10 MG/ML
INJECTION, EMULSION INTRAVENOUS
Status: DISPENSED
Start: 2024-09-06

## (undated) RX ORDER — DEXAMETHASONE SODIUM PHOSPHATE 4 MG/ML
INJECTION, SOLUTION INTRA-ARTICULAR; INTRALESIONAL; INTRAMUSCULAR; INTRAVENOUS; SOFT TISSUE
Status: DISPENSED
Start: 2024-07-30

## (undated) RX ORDER — DEXAMETHASONE SODIUM PHOSPHATE 4 MG/ML
INJECTION, SOLUTION INTRA-ARTICULAR; INTRALESIONAL; INTRAMUSCULAR; INTRAVENOUS; SOFT TISSUE
Status: DISPENSED
Start: 2022-07-24

## (undated) RX ORDER — DEXAMETHASONE SODIUM PHOSPHATE 4 MG/ML
INJECTION, SOLUTION INTRA-ARTICULAR; INTRALESIONAL; INTRAMUSCULAR; INTRAVENOUS; SOFT TISSUE
Status: DISPENSED
Start: 2024-09-06

## (undated) RX ORDER — BUPIVACAINE HYDROCHLORIDE 5 MG/ML
INJECTION, SOLUTION EPIDURAL; INTRACAUDAL
Status: DISPENSED
Start: 2024-09-06

## (undated) RX ORDER — FENTANYL CITRATE 50 UG/ML
INJECTION, SOLUTION INTRAMUSCULAR; INTRAVENOUS
Status: DISPENSED
Start: 2022-11-30

## (undated) RX ORDER — PROPOFOL 10 MG/ML
INJECTION, EMULSION INTRAVENOUS
Status: DISPENSED
Start: 2023-01-19

## (undated) RX ORDER — BUPIVACAINE HYDROCHLORIDE 2.5 MG/ML
INJECTION, SOLUTION EPIDURAL; INFILTRATION; INTRACAUDAL
Status: DISPENSED
Start: 2022-06-05

## (undated) RX ORDER — LIDOCAINE HYDROCHLORIDE 10 MG/ML
INJECTION, SOLUTION EPIDURAL; INFILTRATION; INTRACAUDAL; PERINEURAL
Status: DISPENSED
Start: 2024-07-30

## (undated) RX ORDER — BUPIVACAINE HYDROCHLORIDE 5 MG/ML
INJECTION, SOLUTION EPIDURAL; INTRACAUDAL
Status: DISPENSED
Start: 2022-07-28

## (undated) RX ORDER — LIDOCAINE HYDROCHLORIDE 10 MG/ML
INJECTION, SOLUTION EPIDURAL; INFILTRATION; INTRACAUDAL; PERINEURAL
Status: DISPENSED
Start: 2024-09-06

## (undated) RX ORDER — FENTANYL CITRATE 50 UG/ML
INJECTION, SOLUTION INTRAMUSCULAR; INTRAVENOUS
Status: DISPENSED
Start: 2022-07-28

## (undated) RX ORDER — CEFAZOLIN SODIUM/WATER 2 G/20 ML
SYRINGE (ML) INTRAVENOUS
Status: DISPENSED
Start: 2023-10-19

## (undated) RX ORDER — DEXAMETHASONE SODIUM PHOSPHATE 4 MG/ML
INJECTION, SOLUTION INTRA-ARTICULAR; INTRALESIONAL; INTRAMUSCULAR; INTRAVENOUS; SOFT TISSUE
Status: DISPENSED
Start: 2024-06-18

## (undated) RX ORDER — FENTANYL CITRATE-0.9 % NACL/PF 10 MCG/ML
PLASTIC BAG, INJECTION (ML) INTRAVENOUS
Status: DISPENSED
Start: 2022-11-30

## (undated) RX ORDER — PROPOFOL 10 MG/ML
INJECTION, EMULSION INTRAVENOUS
Status: DISPENSED
Start: 2022-06-05

## (undated) RX ORDER — KETAMINE HCL IN 0.9 % NACL 50 MG/5 ML
SYRINGE (ML) INTRAVENOUS
Status: DISPENSED
Start: 2019-11-08

## (undated) RX ORDER — BUPIVACAINE HYDROCHLORIDE 5 MG/ML
INJECTION, SOLUTION EPIDURAL; INTRACAUDAL
Status: DISPENSED
Start: 2019-11-08

## (undated) RX ORDER — FENTANYL CITRATE 50 UG/ML
INJECTION, SOLUTION INTRAMUSCULAR; INTRAVENOUS
Status: DISPENSED
Start: 2023-10-19